# Patient Record
Sex: FEMALE | Race: BLACK OR AFRICAN AMERICAN | Employment: OTHER | URBAN - METROPOLITAN AREA
[De-identification: names, ages, dates, MRNs, and addresses within clinical notes are randomized per-mention and may not be internally consistent; named-entity substitution may affect disease eponyms.]

---

## 2017-01-23 ENCOUNTER — OFFICE VISIT (OUTPATIENT)
Dept: SURGERY | Age: 72
End: 2017-01-23

## 2017-01-23 VITALS
HEART RATE: 80 BPM | SYSTOLIC BLOOD PRESSURE: 164 MMHG | HEIGHT: 59 IN | WEIGHT: 149 LBS | DIASTOLIC BLOOD PRESSURE: 76 MMHG | BODY MASS INDEX: 30.04 KG/M2

## 2017-01-23 LAB
GLUCOSE BLD-MCNC: 229 MG/DL
HBA1C MFR BLD: 7.8 %

## 2017-01-23 PROCEDURE — 99213 OFFICE O/P EST LOW 20 MIN: CPT | Performed by: INTERNAL MEDICINE

## 2017-01-23 PROCEDURE — 82962 GLUCOSE BLOOD TEST: CPT | Performed by: INTERNAL MEDICINE

## 2017-01-23 PROCEDURE — 83036 HEMOGLOBIN GLYCOSYLATED A1C: CPT | Performed by: INTERNAL MEDICINE

## 2017-01-23 ASSESSMENT — ENCOUNTER SYMPTOMS: EYES NEGATIVE: 1

## 2017-02-06 ENCOUNTER — OFFICE VISIT (OUTPATIENT)
Dept: PHYSICAL MEDICINE AND REHAB | Age: 72
End: 2017-02-06

## 2017-02-06 VITALS
HEIGHT: 59 IN | SYSTOLIC BLOOD PRESSURE: 138 MMHG | DIASTOLIC BLOOD PRESSURE: 70 MMHG | WEIGHT: 145 LBS | BODY MASS INDEX: 29.23 KG/M2

## 2017-02-06 DIAGNOSIS — Z79.899 HIGH RISK MEDICATION USE: Chronic | ICD-10-CM

## 2017-02-06 DIAGNOSIS — G47.01 INSOMNIA SECONDARY TO CHRONIC PAIN: ICD-10-CM

## 2017-02-06 DIAGNOSIS — M54.2 NECK PAIN: ICD-10-CM

## 2017-02-06 DIAGNOSIS — Z91.199 NON-COMPLIANT PATIENT: ICD-10-CM

## 2017-02-06 DIAGNOSIS — M54.12 CERVICAL RADICULAR PAIN: ICD-10-CM

## 2017-02-06 DIAGNOSIS — G89.29 INSOMNIA SECONDARY TO CHRONIC PAIN: ICD-10-CM

## 2017-02-06 DIAGNOSIS — M54.10 DIABETIC RADICULOPATHY (HCC): Primary | ICD-10-CM

## 2017-02-06 DIAGNOSIS — F32.9 REACTIVE DEPRESSION: ICD-10-CM

## 2017-02-06 DIAGNOSIS — M47.26 OSTEOARTHRITIS OF SPINE WITH RADICULOPATHY, LUMBAR REGION: ICD-10-CM

## 2017-02-06 DIAGNOSIS — E11.49 DIABETIC RADICULOPATHY (HCC): Primary | ICD-10-CM

## 2017-02-06 LAB
AMPHETAMINE SCREEN, URINE: NORMAL
BARBITURATE SCREEN URINE: NORMAL
BENZODIAZEPINE SCREEN, URINE: NORMAL
CANNABINOID SCREEN URINE: NORMAL
COCAINE METABOLITE SCREEN URINE: NORMAL
Lab: NORMAL
OPIATE SCREEN URINE: NORMAL
PHENCYCLIDINE SCREEN URINE: NORMAL

## 2017-02-06 PROCEDURE — 99213 OFFICE O/P EST LOW 20 MIN: CPT | Performed by: PHYSICAL MEDICINE & REHABILITATION

## 2017-02-06 RX ORDER — HYDROCODONE BITARTRATE AND ACETAMINOPHEN 10; 325 MG/1; MG/1
1 TABLET ORAL EVERY 6 HOURS PRN
Qty: 60 TABLET | Refills: 0 | Status: SHIPPED | OUTPATIENT
Start: 2017-02-06 | End: 2017-06-12 | Stop reason: SDUPTHER

## 2017-02-06 RX ORDER — GABAPENTIN 600 MG/1
600 TABLET ORAL 3 TIMES DAILY
Qty: 90 TABLET | Refills: 3 | Status: SHIPPED | OUTPATIENT
Start: 2017-02-06 | End: 2017-09-22 | Stop reason: SDUPTHER

## 2017-02-06 ASSESSMENT — ENCOUNTER SYMPTOMS
SHORTNESS OF BREATH: 1
DIARRHEA: 0
BACK PAIN: 1
ABDOMINAL PAIN: 0
VOMITING: 0
EYES NEGATIVE: 1
NAUSEA: 0
CONSTIPATION: 0
BOWEL INCONTINENCE: 0

## 2017-02-14 ENCOUNTER — OFFICE VISIT (OUTPATIENT)
Dept: FAMILY MEDICINE CLINIC | Age: 72
End: 2017-02-14

## 2017-02-14 VITALS
HEIGHT: 59 IN | RESPIRATION RATE: 22 BRPM | SYSTOLIC BLOOD PRESSURE: 140 MMHG | BODY MASS INDEX: 30.04 KG/M2 | OXYGEN SATURATION: 98 % | WEIGHT: 149 LBS | DIASTOLIC BLOOD PRESSURE: 78 MMHG | TEMPERATURE: 96.5 F | HEART RATE: 93 BPM

## 2017-02-14 DIAGNOSIS — E78.49 OTHER HYPERLIPIDEMIA: Primary | ICD-10-CM

## 2017-02-14 DIAGNOSIS — F41.9 ANXIETY: ICD-10-CM

## 2017-02-14 DIAGNOSIS — I10 ESSENTIAL HYPERTENSION: ICD-10-CM

## 2017-02-14 PROCEDURE — 99214 OFFICE O/P EST MOD 30 MIN: CPT | Performed by: FAMILY MEDICINE

## 2017-02-14 RX ORDER — METOPROLOL SUCCINATE 100 MG/1
TABLET, EXTENDED RELEASE ORAL
Qty: 30 TABLET | Refills: 1 | Status: SHIPPED | OUTPATIENT
Start: 2017-02-14 | End: 2017-09-19 | Stop reason: SDUPTHER

## 2017-02-14 RX ORDER — PANTOPRAZOLE SODIUM 40 MG/1
40 TABLET, DELAYED RELEASE ORAL DAILY
Qty: 30 TABLET | Refills: 0 | Status: CANCELLED | OUTPATIENT
Start: 2017-02-14

## 2017-02-14 RX ORDER — PAROXETINE HYDROCHLORIDE 40 MG/1
TABLET, FILM COATED ORAL
Qty: 30 TABLET | Refills: 1 | Status: SHIPPED | OUTPATIENT
Start: 2017-02-14 | End: 2017-04-24 | Stop reason: SDUPTHER

## 2017-02-14 RX ORDER — ROSUVASTATIN CALCIUM 10 MG/1
10 TABLET, COATED ORAL NIGHTLY
Qty: 30 TABLET | Refills: 1 | Status: SHIPPED | OUTPATIENT
Start: 2017-02-14 | End: 2017-10-17 | Stop reason: SDUPTHER

## 2017-02-14 RX ORDER — AMLODIPINE BESYLATE 5 MG/1
TABLET ORAL
Qty: 30 TABLET | Refills: 1 | Status: SHIPPED | OUTPATIENT
Start: 2017-02-14 | End: 2017-10-17 | Stop reason: SDUPTHER

## 2017-02-14 ASSESSMENT — ENCOUNTER SYMPTOMS: SHORTNESS OF BREATH: 0

## 2017-02-23 ENCOUNTER — TELEPHONE (OUTPATIENT)
Dept: FAMILY MEDICINE CLINIC | Age: 72
End: 2017-02-23

## 2017-03-10 ENCOUNTER — OFFICE VISIT (OUTPATIENT)
Dept: FAMILY MEDICINE CLINIC | Age: 72
End: 2017-03-10

## 2017-03-10 VITALS
TEMPERATURE: 97.5 F | HEART RATE: 96 BPM | WEIGHT: 139 LBS | DIASTOLIC BLOOD PRESSURE: 80 MMHG | HEIGHT: 59 IN | RESPIRATION RATE: 16 BRPM | SYSTOLIC BLOOD PRESSURE: 126 MMHG | BODY MASS INDEX: 28.02 KG/M2

## 2017-03-10 DIAGNOSIS — R10.13 ABDOMINAL PAIN, EPIGASTRIC: ICD-10-CM

## 2017-03-10 DIAGNOSIS — R10.12 ABDOMINAL PAIN, LEFT UPPER QUADRANT: Primary | ICD-10-CM

## 2017-03-10 DIAGNOSIS — R10.12 ABDOMINAL PAIN, LEFT UPPER QUADRANT: ICD-10-CM

## 2017-03-10 LAB
BILIRUBIN, POC: ABNORMAL
BLOOD URINE, POC: ABNORMAL
CLARITY, POC: CLEAR
COLOR, POC: YELLOW
GLUCOSE URINE, POC: ABNORMAL
KETONES, POC: ABNORMAL
LEUKOCYTE EST, POC: ABNORMAL
NITRITE, POC: ABNORMAL
PH, POC: 5.5
PROTEIN, POC: ABNORMAL
SPECIFIC GRAVITY, POC: 1020
UROBILINOGEN, POC: ABNORMAL

## 2017-03-10 PROCEDURE — 81003 URINALYSIS AUTO W/O SCOPE: CPT | Performed by: FAMILY MEDICINE

## 2017-03-10 PROCEDURE — 99213 OFFICE O/P EST LOW 20 MIN: CPT | Performed by: FAMILY MEDICINE

## 2017-03-10 RX ORDER — PANTOPRAZOLE SODIUM 40 MG/1
40 TABLET, DELAYED RELEASE ORAL DAILY
Qty: 30 TABLET | Refills: 0 | Status: ON HOLD | OUTPATIENT
Start: 2017-03-10 | End: 2017-06-05

## 2017-03-10 ASSESSMENT — ENCOUNTER SYMPTOMS
ABDOMINAL PAIN: 1
BLOOD IN STOOL: 0

## 2017-03-12 LAB — URINE CULTURE, ROUTINE: NORMAL

## 2017-03-23 ENCOUNTER — ANESTHESIA EVENT (OUTPATIENT)
Dept: ENDOSCOPY | Age: 72
End: 2017-03-23

## 2017-03-23 ASSESSMENT — ENCOUNTER SYMPTOMS: SHORTNESS OF BREATH: 1

## 2017-03-24 ENCOUNTER — HOSPITAL ENCOUNTER (OUTPATIENT)
Age: 72
Setting detail: OUTPATIENT SURGERY
Discharge: HOME | End: 2017-03-24
Attending: INTERNAL MEDICINE | Admitting: INTERNAL MEDICINE

## 2017-03-24 ENCOUNTER — OFFICE VISIT (OUTPATIENT)
Dept: GASTROENTEROLOGY | Age: 72
End: 2017-03-24

## 2017-03-24 ENCOUNTER — ANESTHESIA (OUTPATIENT)
Dept: ENDOSCOPY | Age: 72
End: 2017-03-24

## 2017-03-24 VITALS
BODY MASS INDEX: 27.27 KG/M2 | WEIGHT: 135 LBS | HEART RATE: 96 BPM | SYSTOLIC BLOOD PRESSURE: 167 MMHG | DIASTOLIC BLOOD PRESSURE: 94 MMHG

## 2017-03-24 VITALS
HEART RATE: 100 BPM | WEIGHT: 135 LBS | DIASTOLIC BLOOD PRESSURE: 66 MMHG | BODY MASS INDEX: 27.21 KG/M2 | TEMPERATURE: 98.5 F | SYSTOLIC BLOOD PRESSURE: 146 MMHG | HEIGHT: 59 IN | RESPIRATION RATE: 20 BRPM | OXYGEN SATURATION: 100 %

## 2017-03-24 VITALS
SYSTOLIC BLOOD PRESSURE: 123 MMHG | OXYGEN SATURATION: 100 % | DIASTOLIC BLOOD PRESSURE: 57 MMHG | RESPIRATION RATE: 32 BRPM

## 2017-03-24 DIAGNOSIS — R10.12 LUQ ABDOMINAL PAIN: Primary | ICD-10-CM

## 2017-03-24 PROCEDURE — 7100000010 HC PHASE II RECOVERY - FIRST 15 MIN: Performed by: INTERNAL MEDICINE

## 2017-03-24 PROCEDURE — 2500000003 HC RX 250 WO HCPCS: Performed by: NURSE ANESTHETIST, CERTIFIED REGISTERED

## 2017-03-24 PROCEDURE — 88305 TISSUE EXAM BY PATHOLOGIST: CPT

## 2017-03-24 PROCEDURE — 2580000003 HC RX 258: Performed by: ANESTHESIOLOGY

## 2017-03-24 PROCEDURE — 3700000000 HC ANESTHESIA ATTENDED CARE: Performed by: INTERNAL MEDICINE

## 2017-03-24 PROCEDURE — 3700000001 HC ADD 15 MINUTES (ANESTHESIA): Performed by: INTERNAL MEDICINE

## 2017-03-24 PROCEDURE — 6360000002 HC RX W HCPCS: Performed by: NURSE ANESTHETIST, CERTIFIED REGISTERED

## 2017-03-24 PROCEDURE — 88342 IMHCHEM/IMCYTCHM 1ST ANTB: CPT

## 2017-03-24 PROCEDURE — 3609017100 HC EGD: Performed by: INTERNAL MEDICINE

## 2017-03-24 RX ORDER — SODIUM CHLORIDE 9 MG/ML
INJECTION, SOLUTION INTRAVENOUS CONTINUOUS
Status: DISCONTINUED | OUTPATIENT
Start: 2017-03-24 | End: 2017-03-24 | Stop reason: HOSPADM

## 2017-03-24 RX ORDER — ONDANSETRON 2 MG/ML
4 INJECTION INTRAMUSCULAR; INTRAVENOUS
Status: DISCONTINUED | OUTPATIENT
Start: 2017-03-24 | End: 2017-03-24 | Stop reason: HOSPADM

## 2017-03-24 RX ORDER — LABETALOL HYDROCHLORIDE 5 MG/ML
INJECTION, SOLUTION INTRAVENOUS PRN
Status: DISCONTINUED | OUTPATIENT
Start: 2017-03-24 | End: 2017-03-24 | Stop reason: SDUPTHER

## 2017-03-24 RX ORDER — PROPOFOL 10 MG/ML
INJECTION, EMULSION INTRAVENOUS PRN
Status: DISCONTINUED | OUTPATIENT
Start: 2017-03-24 | End: 2017-03-24 | Stop reason: SDUPTHER

## 2017-03-24 RX ADMIN — PROPOFOL 50 MG: 10 INJECTION, EMULSION INTRAVENOUS at 09:05

## 2017-03-24 RX ADMIN — PROPOFOL 20 MG: 10 INJECTION, EMULSION INTRAVENOUS at 09:08

## 2017-03-24 RX ADMIN — PROPOFOL 10 MG: 10 INJECTION, EMULSION INTRAVENOUS at 09:11

## 2017-03-24 RX ADMIN — PROPOFOL 10 MG: 10 INJECTION, EMULSION INTRAVENOUS at 09:06

## 2017-03-24 RX ADMIN — LABETALOL HYDROCHLORIDE 5 MG: 5 INJECTION, SOLUTION INTRAVENOUS at 08:57

## 2017-03-24 RX ADMIN — LABETALOL HYDROCHLORIDE 5 MG: 5 INJECTION, SOLUTION INTRAVENOUS at 08:55

## 2017-03-24 RX ADMIN — PROPOFOL 10 MG: 10 INJECTION, EMULSION INTRAVENOUS at 09:09

## 2017-03-24 RX ADMIN — PROPOFOL 10 MG: 10 INJECTION, EMULSION INTRAVENOUS at 09:10

## 2017-03-24 RX ADMIN — PROPOFOL 10 MG: 10 INJECTION, EMULSION INTRAVENOUS at 09:07

## 2017-03-24 RX ADMIN — SODIUM CHLORIDE: 9 INJECTION, SOLUTION INTRAVENOUS at 08:06

## 2017-03-24 ASSESSMENT — PAIN - FUNCTIONAL ASSESSMENT: PAIN_FUNCTIONAL_ASSESSMENT: 0-10

## 2017-03-24 ASSESSMENT — ENCOUNTER SYMPTOMS: SHORTNESS OF BREATH: 1

## 2017-03-24 ASSESSMENT — PAIN DESCRIPTION - DESCRIPTORS: DESCRIPTORS: BURNING;CRAMPING

## 2017-04-01 ENCOUNTER — HOSPITAL ENCOUNTER (OUTPATIENT)
Dept: CT IMAGING | Age: 72
Discharge: HOME | End: 2017-04-01
Payer: MEDICARE

## 2017-04-01 VITALS — HEART RATE: 132 BPM | RESPIRATION RATE: 20 BRPM | DIASTOLIC BLOOD PRESSURE: 89 MMHG | SYSTOLIC BLOOD PRESSURE: 136 MMHG

## 2017-04-01 DIAGNOSIS — R10.12 ABDOMINAL PAIN, LEFT UPPER QUADRANT: ICD-10-CM

## 2017-04-01 DIAGNOSIS — R10.13 ABDOMINAL PAIN, EPIGASTRIC: ICD-10-CM

## 2017-04-01 LAB
GFR AFRICAN AMERICAN: >60
GFR NON-AFRICAN AMERICAN: 54
PERFORMED ON: ABNORMAL
POC CREATININE: 1 MG/DL (ref 0.6–1.2)
POC SAMPLE TYPE: ABNORMAL

## 2017-04-01 PROCEDURE — 6360000004 HC RX CONTRAST MEDICATION: Performed by: RADIOLOGY

## 2017-04-01 PROCEDURE — 74177 CT ABD & PELVIS W/CONTRAST: CPT

## 2017-04-01 PROCEDURE — 2500000003 HC RX 250 WO HCPCS: Performed by: RADIOLOGY

## 2017-04-01 RX ADMIN — BARIUM SULFATE 450 ML: 21 SUSPENSION ORAL at 13:31

## 2017-04-01 RX ADMIN — IOPAMIDOL 100 ML: 755 INJECTION, SOLUTION INTRAVENOUS at 13:31

## 2017-04-11 ENCOUNTER — OFFICE VISIT (OUTPATIENT)
Dept: PHYSICAL MEDICINE AND REHAB | Age: 72
End: 2017-04-11

## 2017-04-11 VITALS
WEIGHT: 137.7 LBS | HEIGHT: 59 IN | BODY MASS INDEX: 27.76 KG/M2 | SYSTOLIC BLOOD PRESSURE: 136 MMHG | DIASTOLIC BLOOD PRESSURE: 82 MMHG

## 2017-04-11 DIAGNOSIS — M54.2 NECK PAIN: ICD-10-CM

## 2017-04-11 DIAGNOSIS — R10.10 PAIN OF UPPER ABDOMEN: ICD-10-CM

## 2017-04-11 DIAGNOSIS — E11.42: ICD-10-CM

## 2017-04-11 DIAGNOSIS — M54.12 CERVICAL RADICULAR PAIN: ICD-10-CM

## 2017-04-11 DIAGNOSIS — M79.10 MYALGIA: ICD-10-CM

## 2017-04-11 DIAGNOSIS — Z79.899 HIGH RISK MEDICATION USE: ICD-10-CM

## 2017-04-11 DIAGNOSIS — M54.17 LUMBOSACRAL RADICULOPATHY AT L5: Primary | Chronic | ICD-10-CM

## 2017-04-11 DIAGNOSIS — M48.062 SPINAL STENOSIS OF LUMBAR REGION WITH NEUROGENIC CLAUDICATION: Chronic | ICD-10-CM

## 2017-04-11 DIAGNOSIS — Z78.9 IMPAIRED MOBILITY AND ACTIVITIES OF DAILY LIVING: ICD-10-CM

## 2017-04-11 DIAGNOSIS — I10 ESSENTIAL HYPERTENSION: ICD-10-CM

## 2017-04-11 DIAGNOSIS — G56.03 BILATERAL CARPAL TUNNEL SYNDROME: Chronic | ICD-10-CM

## 2017-04-11 DIAGNOSIS — E78.49 OTHER HYPERLIPIDEMIA: ICD-10-CM

## 2017-04-11 DIAGNOSIS — Z74.09 IMPAIRED MOBILITY AND ACTIVITIES OF DAILY LIVING: ICD-10-CM

## 2017-04-11 DIAGNOSIS — M51.36 DDD (DEGENERATIVE DISC DISEASE), LUMBAR: ICD-10-CM

## 2017-04-11 LAB
ALBUMIN SERPL-MCNC: 4.2 G/DL (ref 3.9–4.9)
ALP BLD-CCNC: 229 U/L (ref 40–130)
ALT SERPL-CCNC: 21 U/L (ref 0–33)
ANION GAP SERPL CALCULATED.3IONS-SCNC: 19 MEQ/L (ref 7–13)
AST SERPL-CCNC: 20 U/L (ref 0–35)
BASOPHILS ABSOLUTE: 0.1 K/UL (ref 0–0.2)
BASOPHILS RELATIVE PERCENT: 1.2 %
BILIRUB SERPL-MCNC: 0.3 MG/DL (ref 0–1.2)
BUN BLDV-MCNC: 12 MG/DL (ref 8–23)
CALCIUM SERPL-MCNC: 10.5 MG/DL (ref 8.6–10.2)
CHLORIDE BLD-SCNC: 96 MEQ/L (ref 98–107)
CHOLESTEROL, TOTAL: 277 MG/DL (ref 0–199)
CO2: 24 MEQ/L (ref 22–29)
CREAT SERPL-MCNC: 0.75 MG/DL (ref 0.5–0.9)
EOSINOPHILS ABSOLUTE: 0.2 K/UL (ref 0–0.7)
EOSINOPHILS RELATIVE PERCENT: 3.2 %
GFR AFRICAN AMERICAN: >60
GFR NON-AFRICAN AMERICAN: >60
GLOBULIN: 2.7 G/DL (ref 2.3–3.5)
GLUCOSE BLD-MCNC: 452 MG/DL (ref 74–109)
HCT VFR BLD CALC: 48.8 % (ref 37–47)
HDLC SERPL-MCNC: 54 MG/DL (ref 40–59)
HEMOGLOBIN: 16.3 G/DL (ref 12–16)
LDL CHOLESTEROL CALCULATED: 188 MG/DL (ref 0–129)
LYMPHOCYTES ABSOLUTE: 1.9 K/UL (ref 1–4.8)
LYMPHOCYTES RELATIVE PERCENT: 29.4 %
MCH RBC QN AUTO: 29.8 PG (ref 27–31.3)
MCHC RBC AUTO-ENTMCNC: 33.3 % (ref 33–37)
MCV RBC AUTO: 89.5 FL (ref 82–100)
MONOCYTES ABSOLUTE: 0.4 K/UL (ref 0.2–0.8)
MONOCYTES RELATIVE PERCENT: 6.4 %
NEUTROPHILS ABSOLUTE: 3.9 K/UL (ref 1.4–6.5)
NEUTROPHILS RELATIVE PERCENT: 59.8 %
PDW BLD-RTO: 14 % (ref 11.5–14.5)
PLATELET # BLD: 330 K/UL (ref 130–400)
POTASSIUM SERPL-SCNC: 4.8 MEQ/L (ref 3.5–5.1)
RBC # BLD: 5.46 M/UL (ref 4.2–5.4)
SODIUM BLD-SCNC: 139 MEQ/L (ref 132–144)
TOTAL PROTEIN: 6.9 G/DL (ref 6.4–8.1)
TRIGL SERPL-MCNC: 175 MG/DL (ref 0–200)
WBC # BLD: 6.6 K/UL (ref 4.8–10.8)

## 2017-04-11 PROCEDURE — 99215 OFFICE O/P EST HI 40 MIN: CPT | Performed by: PHYSICAL MEDICINE & REHABILITATION

## 2017-04-11 RX ORDER — TOPIRAMATE 25 MG/1
TABLET ORAL
Qty: 60 TABLET | Refills: 3 | Status: SHIPPED | OUTPATIENT
Start: 2017-04-11 | End: 2017-09-22 | Stop reason: SDUPTHER

## 2017-04-11 RX ORDER — LIDOCAINE 50 MG/G
1 PATCH TOPICAL DAILY
Qty: 30 PATCH | Refills: 0 | Status: ON HOLD | OUTPATIENT
Start: 2017-04-11 | End: 2017-06-05

## 2017-04-11 ASSESSMENT — ENCOUNTER SYMPTOMS
WHEEZING: 1
CHEST TIGHTNESS: 1
DIARRHEA: 0
ABDOMINAL PAIN: 1
CONSTIPATION: 0
VOMITING: 1
BACK PAIN: 1
SHORTNESS OF BREATH: 1
NAUSEA: 1
ABDOMINAL DISTENTION: 1
EYES NEGATIVE: 1

## 2017-04-11 ASSESSMENT — CROHNS DISEASE ACTIVITY INDEX (CDAI): CDAI SCORE: 0

## 2017-04-12 ENCOUNTER — PROCEDURE VISIT (OUTPATIENT)
Dept: PHYSICAL MEDICINE AND REHAB | Age: 72
End: 2017-04-12

## 2017-04-12 DIAGNOSIS — M79.10 MYALGIA: Primary | ICD-10-CM

## 2017-04-12 PROCEDURE — 20553 NJX 1/MLT TRIGGER POINTS 3/>: CPT | Performed by: PHYSICAL MEDICINE & REHABILITATION

## 2017-04-17 ENCOUNTER — OFFICE VISIT (OUTPATIENT)
Dept: SURGERY | Age: 72
End: 2017-04-17

## 2017-04-17 VITALS
SYSTOLIC BLOOD PRESSURE: 136 MMHG | HEIGHT: 59 IN | DIASTOLIC BLOOD PRESSURE: 70 MMHG | WEIGHT: 137 LBS | HEART RATE: 100 BPM | BODY MASS INDEX: 27.62 KG/M2

## 2017-04-17 LAB
GLUCOSE BLD-MCNC: 494 MG/DL
HBA1C MFR BLD: 14 %

## 2017-04-17 PROCEDURE — 83036 HEMOGLOBIN GLYCOSYLATED A1C: CPT | Performed by: INTERNAL MEDICINE

## 2017-04-17 PROCEDURE — 82962 GLUCOSE BLOOD TEST: CPT | Performed by: INTERNAL MEDICINE

## 2017-04-17 PROCEDURE — 99213 OFFICE O/P EST LOW 20 MIN: CPT | Performed by: INTERNAL MEDICINE

## 2017-04-17 ASSESSMENT — ENCOUNTER SYMPTOMS: EYES NEGATIVE: 1

## 2017-04-24 RX ORDER — PAROXETINE HYDROCHLORIDE 40 MG/1
TABLET, FILM COATED ORAL
Qty: 30 TABLET | Refills: 1 | Status: SHIPPED | OUTPATIENT
Start: 2017-04-24 | End: 2017-09-19 | Stop reason: SDUPTHER

## 2017-06-05 ENCOUNTER — HOSPITAL ENCOUNTER (OUTPATIENT)
Age: 72
Setting detail: OUTPATIENT SURGERY
Discharge: HOME | End: 2017-06-05
Attending: ORTHOPAEDIC SURGERY | Admitting: ORTHOPAEDIC SURGERY
Payer: MEDICARE

## 2017-06-05 VITALS
DIASTOLIC BLOOD PRESSURE: 99 MMHG | WEIGHT: 140 LBS | OXYGEN SATURATION: 100 % | TEMPERATURE: 97.4 F | HEIGHT: 59 IN | SYSTOLIC BLOOD PRESSURE: 198 MMHG | RESPIRATION RATE: 20 BRPM | HEART RATE: 83 BPM | BODY MASS INDEX: 28.22 KG/M2

## 2017-06-05 PROCEDURE — 2500000003 HC RX 250 WO HCPCS: Performed by: ORTHOPAEDIC SURGERY

## 2017-06-05 PROCEDURE — 2580000003 HC RX 258: Performed by: ORTHOPAEDIC SURGERY

## 2017-06-05 PROCEDURE — 7100000010 HC PHASE II RECOVERY - FIRST 15 MIN: Performed by: ORTHOPAEDIC SURGERY

## 2017-06-05 PROCEDURE — 3600000003 HC SURGERY LEVEL 3 BASE: Performed by: ORTHOPAEDIC SURGERY

## 2017-06-05 PROCEDURE — 3600000013 HC SURGERY LEVEL 3 ADDTL 15MIN: Performed by: ORTHOPAEDIC SURGERY

## 2017-06-05 RX ORDER — TRAMADOL HYDROCHLORIDE 50 MG/1
50 TABLET ORAL EVERY 6 HOURS PRN
Qty: 20 TABLET | Refills: 0 | Status: SHIPPED | OUTPATIENT
Start: 2017-06-05 | End: 2017-06-15

## 2017-06-05 RX ORDER — LIDOCAINE HYDROCHLORIDE 20 MG/ML
INJECTION, SOLUTION EPIDURAL; INFILTRATION; INTRACAUDAL; PERINEURAL PRN
Status: DISCONTINUED | OUTPATIENT
Start: 2017-06-05 | End: 2017-06-05 | Stop reason: HOSPADM

## 2017-06-05 RX ORDER — MAGNESIUM HYDROXIDE 1200 MG/15ML
LIQUID ORAL CONTINUOUS PRN
Status: DISCONTINUED | OUTPATIENT
Start: 2017-06-05 | End: 2017-06-05 | Stop reason: HOSPADM

## 2017-06-05 ASSESSMENT — PAIN - FUNCTIONAL ASSESSMENT: PAIN_FUNCTIONAL_ASSESSMENT: 0-10

## 2017-06-06 ENCOUNTER — TELEPHONE (OUTPATIENT)
Dept: FAMILY MEDICINE CLINIC | Age: 72
End: 2017-06-06

## 2017-06-06 DIAGNOSIS — Z12.31 ENCOUNTER FOR SCREENING MAMMOGRAM FOR MALIGNANT NEOPLASM OF BREAST: Primary | ICD-10-CM

## 2017-06-07 DIAGNOSIS — N63.10 LUMP OF RIGHT BREAST: Primary | ICD-10-CM

## 2017-06-12 ENCOUNTER — OFFICE VISIT (OUTPATIENT)
Dept: PHYSICAL MEDICINE AND REHAB | Age: 72
End: 2017-06-12

## 2017-06-12 VITALS
HEIGHT: 59 IN | SYSTOLIC BLOOD PRESSURE: 156 MMHG | BODY MASS INDEX: 26.91 KG/M2 | WEIGHT: 133.5 LBS | DIASTOLIC BLOOD PRESSURE: 80 MMHG

## 2017-06-12 DIAGNOSIS — M54.12 CERVICAL RADICULAR PAIN: ICD-10-CM

## 2017-06-12 DIAGNOSIS — G56.03 BILATERAL CARPAL TUNNEL SYNDROME: Chronic | ICD-10-CM

## 2017-06-12 DIAGNOSIS — F32.9 REACTIVE DEPRESSION: ICD-10-CM

## 2017-06-12 DIAGNOSIS — M48.062 SPINAL STENOSIS OF LUMBAR REGION WITH NEUROGENIC CLAUDICATION: Chronic | ICD-10-CM

## 2017-06-12 DIAGNOSIS — Z79.899 HIGH RISK MEDICATION USE: ICD-10-CM

## 2017-06-12 DIAGNOSIS — M54.17 LUMBOSACRAL RADICULOPATHY AT L5: Primary | Chronic | ICD-10-CM

## 2017-06-12 DIAGNOSIS — M47.26 OSTEOARTHRITIS OF SPINE WITH RADICULOPATHY, LUMBAR REGION: ICD-10-CM

## 2017-06-12 DIAGNOSIS — Z79.899 HIGH RISK MEDICATION USE: Chronic | ICD-10-CM

## 2017-06-12 PROCEDURE — 99213 OFFICE O/P EST LOW 20 MIN: CPT | Performed by: PHYSICAL MEDICINE & REHABILITATION

## 2017-06-12 RX ORDER — HYDROCODONE BITARTRATE AND ACETAMINOPHEN 10; 325 MG/1; MG/1
1 TABLET ORAL EVERY 6 HOURS PRN
Qty: 60 TABLET | Refills: 0 | Status: SHIPPED | OUTPATIENT
Start: 2017-06-12 | End: 2017-08-14 | Stop reason: SDUPTHER

## 2017-06-12 ASSESSMENT — ENCOUNTER SYMPTOMS
SHORTNESS OF BREATH: 0
ALLERGIC/IMMUNOLOGIC NEGATIVE: 1
DIARRHEA: 0
CONSTIPATION: 1
ABDOMINAL PAIN: 1
CHEST TIGHTNESS: 0
VOMITING: 0
EYES NEGATIVE: 1
ABDOMINAL DISTENTION: 0
NAUSEA: 0
WHEEZING: 0
BACK PAIN: 1

## 2017-06-12 ASSESSMENT — CROHNS DISEASE ACTIVITY INDEX (CDAI): CDAI SCORE: 0

## 2017-06-14 ENCOUNTER — TELEPHONE (OUTPATIENT)
Dept: FAMILY MEDICINE CLINIC | Age: 72
End: 2017-06-14

## 2017-06-19 DIAGNOSIS — R10.13 ABDOMINAL PAIN, EPIGASTRIC: ICD-10-CM

## 2017-06-20 RX ORDER — PANTOPRAZOLE SODIUM 40 MG/1
TABLET, DELAYED RELEASE ORAL
Qty: 30 TABLET | Refills: 0 | Status: SHIPPED | OUTPATIENT
Start: 2017-06-20 | End: 2017-08-14 | Stop reason: SDUPTHER

## 2017-07-13 DIAGNOSIS — M54.17 LUMBOSACRAL RADICULOPATHY AT L5: Chronic | ICD-10-CM

## 2017-07-13 DIAGNOSIS — M79.10 MYALGIA: ICD-10-CM

## 2017-07-13 DIAGNOSIS — Z79.899 HIGH RISK MEDICATION USE: Chronic | ICD-10-CM

## 2017-07-13 DIAGNOSIS — R10.10 PAIN OF UPPER ABDOMEN: ICD-10-CM

## 2017-07-13 DIAGNOSIS — E11.42: ICD-10-CM

## 2017-07-13 DIAGNOSIS — M51.36 DDD (DEGENERATIVE DISC DISEASE), LUMBAR: ICD-10-CM

## 2017-07-13 DIAGNOSIS — M48.062 SPINAL STENOSIS OF LUMBAR REGION WITH NEUROGENIC CLAUDICATION: Chronic | ICD-10-CM

## 2017-07-13 DIAGNOSIS — M47.26 OSTEOARTHRITIS OF SPINE WITH RADICULOPATHY, LUMBAR REGION: ICD-10-CM

## 2017-07-13 RX ORDER — HYDROCODONE BITARTRATE AND ACETAMINOPHEN 10; 325 MG/1; MG/1
1 TABLET ORAL EVERY 6 HOURS PRN
Qty: 60 TABLET | Refills: 0 | Status: CANCELLED | OUTPATIENT
Start: 2017-07-13

## 2017-08-10 ENCOUNTER — PROCEDURE VISIT (OUTPATIENT)
Dept: PHYSICAL MEDICINE AND REHAB | Age: 72
End: 2017-08-10

## 2017-08-10 DIAGNOSIS — M54.31 SCIATICA OF RIGHT SIDE: Primary | ICD-10-CM

## 2017-08-10 PROCEDURE — 64445 NJX AA&/STRD SCIATIC NRV IMG: CPT | Performed by: PHYSICAL MEDICINE & REHABILITATION

## 2017-08-14 ENCOUNTER — OFFICE VISIT (OUTPATIENT)
Dept: PHYSICAL MEDICINE AND REHAB | Age: 72
End: 2017-08-14

## 2017-08-14 VITALS
BODY MASS INDEX: 27.01 KG/M2 | WEIGHT: 134 LBS | DIASTOLIC BLOOD PRESSURE: 86 MMHG | HEIGHT: 59 IN | SYSTOLIC BLOOD PRESSURE: 138 MMHG

## 2017-08-14 DIAGNOSIS — M54.17 LUMBOSACRAL RADICULOPATHY AT S1: ICD-10-CM

## 2017-08-14 DIAGNOSIS — M54.10 DIABETIC RADICULOPATHY (HCC): ICD-10-CM

## 2017-08-14 DIAGNOSIS — M47.26 OSTEOARTHRITIS OF SPINE WITH RADICULOPATHY, LUMBAR REGION: ICD-10-CM

## 2017-08-14 DIAGNOSIS — Z79.899 HIGH RISK MEDICATION USE: ICD-10-CM

## 2017-08-14 DIAGNOSIS — M54.17 LUMBOSACRAL RADICULOPATHY AT L5: Primary | Chronic | ICD-10-CM

## 2017-08-14 DIAGNOSIS — M48.062 SPINAL STENOSIS OF LUMBAR REGION WITH NEUROGENIC CLAUDICATION: Chronic | ICD-10-CM

## 2017-08-14 DIAGNOSIS — E11.49 DIABETIC RADICULOPATHY (HCC): ICD-10-CM

## 2017-08-14 DIAGNOSIS — Z79.899 HIGH RISK MEDICATION USE: Chronic | ICD-10-CM

## 2017-08-14 DIAGNOSIS — G56.03 BILATERAL CARPAL TUNNEL SYNDROME: Chronic | ICD-10-CM

## 2017-08-14 PROCEDURE — 99214 OFFICE O/P EST MOD 30 MIN: CPT | Performed by: PHYSICAL MEDICINE & REHABILITATION

## 2017-08-14 RX ORDER — HYDROCODONE BITARTRATE AND ACETAMINOPHEN 10; 325 MG/1; MG/1
1 TABLET ORAL EVERY 6 HOURS PRN
Qty: 60 TABLET | Refills: 0 | Status: SHIPPED | OUTPATIENT
Start: 2017-08-14 | End: 2017-10-16 | Stop reason: SDUPTHER

## 2017-08-14 ASSESSMENT — ENCOUNTER SYMPTOMS
ABDOMINAL DISTENTION: 0
DIARRHEA: 0
EYES NEGATIVE: 1
VOMITING: 0
CHEST TIGHTNESS: 0
BACK PAIN: 1
ABDOMINAL PAIN: 1
SHORTNESS OF BREATH: 0
CONSTIPATION: 0
NAUSEA: 0
WHEEZING: 0
ALLERGIC/IMMUNOLOGIC NEGATIVE: 1

## 2017-08-14 ASSESSMENT — CROHNS DISEASE ACTIVITY INDEX (CDAI): CDAI SCORE: 0

## 2017-09-19 DIAGNOSIS — R10.13 ABDOMINAL PAIN, EPIGASTRIC: ICD-10-CM

## 2017-09-20 RX ORDER — PANTOPRAZOLE SODIUM 40 MG/1
TABLET, DELAYED RELEASE ORAL
Qty: 30 TABLET | Refills: 0 | Status: SHIPPED | OUTPATIENT
Start: 2017-09-20 | End: 2017-10-16 | Stop reason: SDUPTHER

## 2017-09-20 RX ORDER — PAROXETINE HYDROCHLORIDE 40 MG/1
TABLET, FILM COATED ORAL
Qty: 30 TABLET | Refills: 1 | Status: SHIPPED | OUTPATIENT
Start: 2017-09-20 | End: 2017-10-17 | Stop reason: SDUPTHER

## 2017-09-20 RX ORDER — METOPROLOL SUCCINATE 100 MG/1
TABLET, EXTENDED RELEASE ORAL
Qty: 30 TABLET | Refills: 1 | Status: SHIPPED | OUTPATIENT
Start: 2017-09-20 | End: 2018-01-02

## 2017-09-22 DIAGNOSIS — E11.42: ICD-10-CM

## 2017-09-22 DIAGNOSIS — G89.29 INSOMNIA SECONDARY TO CHRONIC PAIN: ICD-10-CM

## 2017-09-22 DIAGNOSIS — G47.01 INSOMNIA SECONDARY TO CHRONIC PAIN: ICD-10-CM

## 2017-09-22 DIAGNOSIS — M54.12 CERVICAL RADICULAR PAIN: ICD-10-CM

## 2017-09-22 DIAGNOSIS — M54.17 LUMBOSACRAL RADICULOPATHY AT L5: Chronic | ICD-10-CM

## 2017-09-22 DIAGNOSIS — M47.26 OSTEOARTHRITIS OF SPINE WITH RADICULOPATHY, LUMBAR REGION: ICD-10-CM

## 2017-09-22 RX ORDER — TOPIRAMATE 25 MG/1
TABLET ORAL
Qty: 180 TABLET | Refills: 0 | Status: SHIPPED | OUTPATIENT
Start: 2017-09-22 | End: 2017-11-16 | Stop reason: SDUPTHER

## 2017-09-22 RX ORDER — GABAPENTIN 600 MG/1
600 TABLET ORAL 3 TIMES DAILY
Qty: 270 TABLET | Refills: 0 | Status: SHIPPED | OUTPATIENT
Start: 2017-09-22 | End: 2017-11-14 | Stop reason: SDUPTHER

## 2017-10-16 ENCOUNTER — OFFICE VISIT (OUTPATIENT)
Dept: PHYSICAL MEDICINE AND REHAB | Age: 72
End: 2017-10-16

## 2017-10-16 VITALS
BODY MASS INDEX: 27.32 KG/M2 | SYSTOLIC BLOOD PRESSURE: 146 MMHG | WEIGHT: 135.5 LBS | HEIGHT: 59 IN | DIASTOLIC BLOOD PRESSURE: 76 MMHG

## 2017-10-16 DIAGNOSIS — M48.062 SPINAL STENOSIS OF LUMBAR REGION WITH NEUROGENIC CLAUDICATION: Primary | Chronic | ICD-10-CM

## 2017-10-16 DIAGNOSIS — Z79.899 HIGH RISK MEDICATION USE: Chronic | ICD-10-CM

## 2017-10-16 DIAGNOSIS — F32.9 REACTIVE DEPRESSION: ICD-10-CM

## 2017-10-16 DIAGNOSIS — M51.36 DDD (DEGENERATIVE DISC DISEASE), LUMBAR: ICD-10-CM

## 2017-10-16 DIAGNOSIS — Z79.899 HIGH RISK MEDICATION USE: ICD-10-CM

## 2017-10-16 DIAGNOSIS — M54.17 LUMBOSACRAL RADICULOPATHY AT L5: Chronic | ICD-10-CM

## 2017-10-16 DIAGNOSIS — G89.29 INSOMNIA SECONDARY TO CHRONIC PAIN: ICD-10-CM

## 2017-10-16 DIAGNOSIS — M54.12 CERVICAL RADICULAR PAIN: ICD-10-CM

## 2017-10-16 DIAGNOSIS — M47.26 OSTEOARTHRITIS OF SPINE WITH RADICULOPATHY, LUMBAR REGION: ICD-10-CM

## 2017-10-16 DIAGNOSIS — G47.01 INSOMNIA SECONDARY TO CHRONIC PAIN: ICD-10-CM

## 2017-10-16 PROCEDURE — 99214 OFFICE O/P EST MOD 30 MIN: CPT | Performed by: PHYSICAL MEDICINE & REHABILITATION

## 2017-10-16 RX ORDER — HYDROCODONE BITARTRATE AND ACETAMINOPHEN 10; 325 MG/1; MG/1
1 TABLET ORAL EVERY 6 HOURS PRN
Qty: 60 TABLET | Refills: 0 | Status: SHIPPED | OUTPATIENT
Start: 2017-10-16 | End: 2017-12-21 | Stop reason: SDUPTHER

## 2017-10-16 ASSESSMENT — ENCOUNTER SYMPTOMS
CHEST TIGHTNESS: 0
WHEEZING: 1
ABDOMINAL DISTENTION: 0
ABDOMINAL PAIN: 1
CONSTIPATION: 0
DIARRHEA: 0
EYES NEGATIVE: 1
SHORTNESS OF BREATH: 1
BACK PAIN: 1
NAUSEA: 0
VOMITING: 0
ALLERGIC/IMMUNOLOGIC NEGATIVE: 1

## 2017-10-16 ASSESSMENT — CROHNS DISEASE ACTIVITY INDEX (CDAI): CDAI SCORE: 0

## 2017-10-16 NOTE — PROGRESS NOTES
Negative for chest pain, palpitations and leg swelling. Gastrointestinal: Positive for abdominal pain. Negative for abdominal distention, constipation, diarrhea, nausea and vomiting. Endocrine: Negative. Genitourinary: Negative. Negative for dysuria. Musculoskeletal: Positive for arthralgias, back pain and myalgias. Negative for gait problem, joint swelling and neck pain. Skin: Negative. Allergic/Immunologic: Negative. Neurological: Negative for dizziness, weakness, light-headedness, numbness and headaches. Psychiatric/Behavioral: Positive for dysphoric mood and sleep disturbance. Negative for hallucinations. The patient is nervous/anxious. Objective    Vitals:    10/16/17 1015 10/16/17 1025   BP: (!) 142/80 (!) 146/76   Weight: 135 lb 8 oz (61.5 kg)    Height: 4' 11\" (1.499 m)      Pain Score: SIX     Physical Exam   Constitutional: She is oriented to person, place, and time. Vital signs are normal. She appears well-developed and well-nourished. Non-toxic appearance. She does not have a sickly appearance. She does not appear ill. No distress. HENT:   Head: Normocephalic and atraumatic. Right Ear: Hearing normal.   Left Ear: Hearing normal.   Nose: Nose normal.   Mouth/Throat: Oropharynx is clear and moist and mucous membranes are normal. No oral lesions. Normal dentition. No oropharyngeal exudate. dysphonic   Eyes: Conjunctivae and EOM are normal. Pupils are equal, round, and reactive to light. Right eye exhibits no chemosis, no discharge and no exudate. Left eye exhibits no chemosis, no discharge and no exudate. No scleral icterus. Neck: Normal range of motion. Neck supple. No JVD present. No neck rigidity. No tracheal deviation and no edema present. No thyromegaly present. Cardiovascular: Normal rate, regular rhythm, normal heart sounds and intact distal pulses. Exam reveals no gallop, no friction rub and no decreased pulses. No murmur heard.   Pulmonary/Chest: Effort normal. No accessory muscle usage. No apnea, no tachypnea and no bradypnea. No respiratory distress. She has decreased breath sounds. She has no wheezes. She has no rales. She exhibits no tenderness. Abdominal: Soft. Bowel sounds are normal. She exhibits no distension and no mass. There is no tenderness. There is no rebound and no guarding. Area of pain and hypersensitivity   Musculoskeletal: She exhibits tenderness. She exhibits no edema. Right shoulder: Normal.        Left shoulder: Normal.        Right elbow: Normal.       Left elbow: Normal.        Right wrist: Normal.        Left wrist: Normal.        Right hip: Normal.        Left hip: Normal.        Right knee: She exhibits decreased range of motion and swelling. Tenderness found. Medial joint line tenderness noted. Left knee: She exhibits decreased range of motion. Tenderness found. Medial joint line and lateral joint line tenderness noted. Right ankle: Normal. Achilles tendon normal.        Left ankle: Normal. Achilles tendon normal.        Cervical back: She exhibits decreased range of motion, tenderness, pain and spasm. Thoracic back: She exhibits decreased range of motion, tenderness, pain and spasm. Lumbar back: She exhibits decreased range of motion, tenderness, bony tenderness and pain. She exhibits no swelling, no edema, no deformity, no laceration and normal pulse. Back:         Right upper arm: Normal.        Left upper arm: Normal.        Right forearm: Normal.        Left forearm: Normal.        Arms:       Right hand: Normal. Normal strength noted. Left hand: Decreased sensation noted. Decreased sensation is present in the medial distribution. Normal strength noted.         Right upper leg: Normal.        Left upper leg: Normal.        Right lower leg: Normal.        Left lower leg: Normal.        Legs:       Right foot: Normal.        Left foot: Normal.        Feet:    Tender areas are indicated by numbered spot    Less tender    Numbness left flank             Neurological: She is alert and oriented to person, place, and time. She displays abnormal reflex. She displays no atrophy and no tremor. A sensory deficit is present. No cranial nerve deficit. She exhibits normal muscle tone. Gait abnormal. Coordination normal. She displays no Babinski's sign on the right side. She displays no Babinski's sign on the left side. Skin: Skin is warm, dry and intact. No abrasion, no bruising, no ecchymosis, no laceration, no petechiae and no rash noted. Rash is not macular, not pustular and not urticarial. She is not diaphoretic. No cyanosis or erythema. No pallor. Nails show no clubbing. Psychiatric: She has a normal mood and affect. Her speech is normal and behavior is normal. Judgment and thought content normal. Her mood appears not anxious. Her affect is not angry, not blunt, not labile and not inappropriate. She is not agitated, not aggressive, not hyperactive, not slowed, not withdrawn, not actively hallucinating and not combative. Thought content is not paranoid and not delusional. Cognition and memory are normal. Cognition and memory are not impaired. She does not express impulsivity or inappropriate judgment. She does not exhibit a depressed mood. She expresses no homicidal and no suicidal ideation. She expresses no suicidal plans and no homicidal plans. She exhibits normal recent memory and normal remote memory. She is attentive. Vitals reviewed. Ortho Exam  Neurologic Exam     Mental Status   Oriented to person, place, and time. Speech: speech is normal     Cranial Nerves     CN III, IV, VI   Pupils are equal, round, and reactive to light.   Extraocular motions are normal.       After a thorough review and discussion of the previous medical records, patient comprehensive medical, surgical, and family and social history, Review of Systems, their OARRS, their Screener and Opioid Assessment for regarding off label use,treatment options and medication and injection risks. Current and old OARRS ([place] Automated Prescription Reporting System) records reviewed, all refills reviewed since last visit,  Behavioral agreement/MICHAELA regulations   and Toxicology screen was reviewed with patient and is up to date. There are no current red flags. They are making good progress regarding pain relief, they are performing at a functional level regarding activities of daily living and psychological functioning, they're not having any adverse effects or side effects from the current medications, and I see no findings of aberrant drug taking her addiction related behaviors. Attestation: The Prescription Monitoring Report for this patient was reviewed today. (Destiny Cancer, DO)  Documentation: Possible medication side effects, risk of tolerance and/or dependence, and alternative treatments discussed., No signs of potential drug abuse or diversion identified., Existing medication contract. (Destiny Cancer, DO)       Patient is currently taking:       I have changed Ms. Villanueva's HYDROcodone-acetaminophen. I am also having her maintain her Insulin Pen Needle, Insulin Syringe-Needle U-100, pantoprazole, albuterol, albuterol sulfate HFA, insulin 70-30, amLODIPine, rosuvastatin, metoprolol succinate, PARoxetine, topiramate, and gabapentin. I also recommend the following Medications:    Orders Placed This Encounter   Medications    HYDROcodone-acetaminophen (NORCO)  MG per tablet     Sig: Take 1 tablet by mouth every 6 hours as needed for Pain  MAX 2 PER DAY/#60 PER MONTH. DO NOT GET NARCOTIC MEDICATIONS FROM ANY OTHER PROVIDER. Elizabeth Ket Date: 10/16/17     Dispense:  60 tablet     Refill:  0        -which helps with pain and function. Otherwise, continue the current pain medications that I have prescibed. Radiologic:   Old films reviewed  ,     I discussed results with patients.    see 11/10/16 SCORE: 27-HIGH RISK     02/06/17 score: 22-high risk   04/11/17 score:32-high risk  06/12/17 score: 21-high risk  08/14/17 score: 18-moderate risk  10/16/17 score: 15-moderate risk               Stop Cigarette/Tobacco use      Weight < 140 lb (63.504 kg)           Injections or Epidurals:  Injection options were discussed. Patient gave verbal consent to ordered injections. See follow-up plans for planned injections. Supplements:  Vitamin D,   Education was given on:   Dietary and Fitness             Follow up with Primary Care Physician regarding their general medical needs. They are to follow up in 2 months to review medication, efficacy of injections, pill counts, OARRS check, SOAPPR assessment, review diagnostics, to review previous and future treatment plans and assess appropriateness for continued therapy.         New Diagnostics  Follow-up with her family doctor about abdominal pain which has subsided that she is taking too much aspirin she is take at most one a day    Cortney Savage, DO

## 2017-10-17 ENCOUNTER — OFFICE VISIT (OUTPATIENT)
Dept: FAMILY MEDICINE CLINIC | Age: 72
End: 2017-10-17

## 2017-10-17 VITALS
SYSTOLIC BLOOD PRESSURE: 168 MMHG | DIASTOLIC BLOOD PRESSURE: 86 MMHG | WEIGHT: 133.2 LBS | HEIGHT: 59 IN | RESPIRATION RATE: 14 BRPM | OXYGEN SATURATION: 99 % | TEMPERATURE: 97.5 F | HEART RATE: 73 BPM | BODY MASS INDEX: 26.85 KG/M2

## 2017-10-17 DIAGNOSIS — F32.A DEPRESSION, UNSPECIFIED DEPRESSION TYPE: ICD-10-CM

## 2017-10-17 DIAGNOSIS — F41.9 ANXIETY: ICD-10-CM

## 2017-10-17 DIAGNOSIS — E78.49 OTHER HYPERLIPIDEMIA: Primary | ICD-10-CM

## 2017-10-17 DIAGNOSIS — K21.9 GASTROESOPHAGEAL REFLUX DISEASE WITHOUT ESOPHAGITIS: ICD-10-CM

## 2017-10-17 DIAGNOSIS — I10 ESSENTIAL HYPERTENSION: ICD-10-CM

## 2017-10-17 DIAGNOSIS — Z76.0 MEDICATION REFILL: ICD-10-CM

## 2017-10-17 PROCEDURE — 99214 OFFICE O/P EST MOD 30 MIN: CPT | Performed by: FAMILY MEDICINE

## 2017-10-17 RX ORDER — ROSUVASTATIN CALCIUM 10 MG/1
10 TABLET, COATED ORAL NIGHTLY
Qty: 90 TABLET | Refills: 0 | Status: SHIPPED | OUTPATIENT
Start: 2017-10-17 | End: 2017-11-29 | Stop reason: SDUPTHER

## 2017-10-17 RX ORDER — PANTOPRAZOLE SODIUM 40 MG/1
40 TABLET, DELAYED RELEASE ORAL DAILY
Qty: 90 TABLET | Refills: 0 | Status: SHIPPED | OUTPATIENT
Start: 2017-10-17 | End: 2017-12-05 | Stop reason: SDUPTHER

## 2017-10-17 RX ORDER — METOPROLOL SUCCINATE 200 MG/1
200 TABLET, EXTENDED RELEASE ORAL DAILY
Qty: 90 TABLET | Refills: 0 | Status: SHIPPED | OUTPATIENT
Start: 2017-10-17 | End: 2017-12-05 | Stop reason: SDUPTHER

## 2017-10-17 RX ORDER — PAROXETINE HYDROCHLORIDE 40 MG/1
TABLET, FILM COATED ORAL
Qty: 90 TABLET | Refills: 0 | Status: SHIPPED | OUTPATIENT
Start: 2017-10-17 | End: 2017-12-05 | Stop reason: SDUPTHER

## 2017-10-17 RX ORDER — AMLODIPINE BESYLATE 5 MG/1
TABLET ORAL
Qty: 90 TABLET | Refills: 0 | Status: SHIPPED | OUTPATIENT
Start: 2017-10-17 | End: 2017-12-05 | Stop reason: SDUPTHER

## 2017-10-17 RX ORDER — ALBUTEROL SULFATE 90 UG/1
2 AEROSOL, METERED RESPIRATORY (INHALATION) EVERY 6 HOURS PRN
Qty: 3 INHALER | Refills: 0 | Status: SHIPPED | OUTPATIENT
Start: 2017-10-17 | End: 2018-04-27

## 2017-10-17 RX ORDER — METOPROLOL SUCCINATE 100 MG/1
TABLET, EXTENDED RELEASE ORAL
Qty: 90 TABLET | Refills: 1 | Status: CANCELLED | OUTPATIENT
Start: 2017-10-17

## 2017-10-17 ASSESSMENT — ENCOUNTER SYMPTOMS
ABDOMINAL PAIN: 0
SHORTNESS OF BREATH: 0

## 2017-10-17 NOTE — PROGRESS NOTES
 Retired-      Social History Main Topics    Smoking status: Former Smoker     Packs/day: 1.00     Years: 15.00     Types: Cigarettes     Quit date: 1/20/2014    Smokeless tobacco: Never Used    Alcohol use No    Drug use: No    Sexual activity: No     Other Topics Concern    None     Social History Narrative    None     Current Outpatient Prescriptions   Medication Sig Dispense Refill    albuterol sulfate  (90 Base) MCG/ACT inhaler Inhale 2 puffs into the lungs every 6 hours as needed for Wheezing 3 Inhaler 0    PARoxetine (PAXIL) 40 MG tablet TAKE ONE TABLET BY MOUTH ONCE DAILY IN THE MORNING 90 tablet 0    amLODIPine (NORVASC) 5 MG tablet TAKE ONE TABLET BY MOUTH ONCE DAILY 90 tablet 0    rosuvastatin (CRESTOR) 10 MG tablet Take 1 tablet by mouth nightly 90 tablet 0    pantoprazole (PROTONIX) 40 MG tablet Take 1 tablet by mouth daily 90 tablet 0    metoprolol succinate (TOPROL XL) 200 MG extended release tablet Take 1 tablet by mouth daily 90 tablet 0    HYDROcodone-acetaminophen (NORCO)  MG per tablet Take 1 tablet by mouth every 6 hours as needed for Pain  MAX 2 PER DAY/#60 PER MONTH. DO NOT GET NARCOTIC MEDICATIONS FROM ANY OTHER PROVIDER. Isaiah Learn Date: 10/16/17 60 tablet 0    topiramate (TOPAMAX) 25 MG tablet Take one or two nightly as tolerated for pain 180 tablet 0    gabapentin (NEURONTIN) 600 MG tablet Take 1 tablet by mouth 3 times daily 270 tablet 0    metoprolol succinate (TOPROL XL) 100 MG extended release tablet TAKE ONE TABLET BY MOUTH ONCE DAILY 30 tablet 1    insulin 70-30 (HUMULIN 70/30) (70-30) 100 UNIT per ML injection vial Inject 60 units bid please give 4 vials for a 30 day supply 4 vial 3    albuterol (PROVENTIL) (2.5 MG/3ML) 0.083% nebulizer solution Take 2.5 mg by nebulization every 4 hours as needed for Wheezing      Insulin Syringe-Needle U-100 ([employer] INSULIN SYRINGE) 31G X 5/16\" 0.5 ML MISC 1 each by Does not apply route daily 100 each 3    Insulin Pen Needle (NOVOFINE) 32G X 6 MM MISC Use bid 200 each 11     No current facility-administered medications for this visit. Family History   Problem Relation Age of Onset    Heart Disease Father     Arthritis Father     Arthritis Mother      Past Medical History:   Diagnosis Date    Anxiety     Asthma     Chronic back pain     Bilateral L5 S1 Radic on emg--surprisingly worse on the left than the right--pt's symptoms and her MRI show worse on the right    Depression     Fibromyalgia     Hyperlipidemia     Hypertension     Osteoarthritis     Type II diabetes mellitus, uncontrolled ([address]     Unspecified sleep apnea        Objective:   Physical Exam  Neck:no carotid bruits. No masses. No adenopathy. No thyroid asymmetry. Lungs:clear and equal breath sounds. No wheezes or rales. Heart:rate reg. No murmur. No gallops   Pulses:Radials 2+ equal               Poster tib 1+ equal  Extremities:no edema in either leg  Gen: In no acute distress  Abdomen; B.S present. Soft  Non tender. No hepatosplenomegaly. No masses   Patient with appropriate affect. Alert  Thought content appropriate  Good eye contact    Assessment:       1. Other hyperlipidemia  Lipid Panel    Comprehensive Metabolic Panel    CBC Auto Differential    Gamma Gt   2. Essential hypertension  Lipid Panel    Comprehensive Metabolic Panel    CBC Auto Differential   3. Anxiety     4. Gastroesophageal reflux disease without esophagitis     5. Depression, unspecified depression type     6.  Medication refill        Plan:     continue current dose of paxil   Orders Placed This Encounter   Medications    albuterol sulfate  (90 Base) MCG/ACT inhaler     Sig: Inhale 2 puffs into the lungs every 6 hours as needed for Wheezing     Dispense:  3 Inhaler     Refill:  0    PARoxetine (PAXIL) 40 MG tablet     Sig: TAKE ONE TABLET BY MOUTH ONCE DAILY IN THE MORNING     Dispense:  90 tablet     Refill:  0    amLODIPine

## 2017-11-14 DIAGNOSIS — M54.12 CERVICAL RADICULAR PAIN: ICD-10-CM

## 2017-11-14 DIAGNOSIS — M47.26 OSTEOARTHRITIS OF SPINE WITH RADICULOPATHY, LUMBAR REGION: ICD-10-CM

## 2017-11-14 DIAGNOSIS — G89.29 INSOMNIA SECONDARY TO CHRONIC PAIN: ICD-10-CM

## 2017-11-14 DIAGNOSIS — G47.01 INSOMNIA SECONDARY TO CHRONIC PAIN: ICD-10-CM

## 2017-11-14 RX ORDER — GABAPENTIN 600 MG/1
600 TABLET ORAL 3 TIMES DAILY
Qty: 270 TABLET | Refills: 0 | Status: SHIPPED | OUTPATIENT
Start: 2017-11-14 | End: 2018-01-02

## 2017-11-16 DIAGNOSIS — M54.17 LUMBOSACRAL RADICULOPATHY AT L5: Chronic | ICD-10-CM

## 2017-11-16 DIAGNOSIS — E11.42: ICD-10-CM

## 2017-11-16 RX ORDER — TOPIRAMATE 25 MG/1
TABLET ORAL
Qty: 180 TABLET | Refills: 0 | Status: SHIPPED | OUTPATIENT
Start: 2017-11-16 | End: 2018-01-02

## 2017-11-29 RX ORDER — ROSUVASTATIN CALCIUM 10 MG/1
10 TABLET, COATED ORAL NIGHTLY
Qty: 30 TABLET | Refills: 0 | Status: SHIPPED | OUTPATIENT
Start: 2017-11-29 | End: 2018-01-04 | Stop reason: SDUPTHER

## 2017-12-21 ENCOUNTER — APPOINTMENT (OUTPATIENT)
Dept: CT IMAGING | Age: 72
End: 2017-12-21
Payer: MEDICARE

## 2017-12-21 ENCOUNTER — OFFICE VISIT (OUTPATIENT)
Dept: PHYSICAL MEDICINE AND REHAB | Age: 72
End: 2017-12-21

## 2017-12-21 ENCOUNTER — HOSPITAL ENCOUNTER (EMERGENCY)
Age: 72
Discharge: HOME | End: 2017-12-21
Attending: EMERGENCY MEDICINE
Payer: MEDICARE

## 2017-12-21 VITALS
OXYGEN SATURATION: 100 % | HEIGHT: 59 IN | DIASTOLIC BLOOD PRESSURE: 90 MMHG | TEMPERATURE: 97.5 F | RESPIRATION RATE: 16 BRPM | WEIGHT: 134.31 LBS | SYSTOLIC BLOOD PRESSURE: 176 MMHG | HEART RATE: 69 BPM | BODY MASS INDEX: 27.08 KG/M2

## 2017-12-21 VITALS
HEIGHT: 59 IN | WEIGHT: 134 LBS | SYSTOLIC BLOOD PRESSURE: 136 MMHG | DIASTOLIC BLOOD PRESSURE: 72 MMHG | BODY MASS INDEX: 27.01 KG/M2

## 2017-12-21 DIAGNOSIS — Z79.899 HIGH RISK MEDICATION USE: Chronic | ICD-10-CM

## 2017-12-21 DIAGNOSIS — M79.7 FIBROMYALGIA: ICD-10-CM

## 2017-12-21 DIAGNOSIS — Z79.899 HIGH RISK MEDICATION USE: ICD-10-CM

## 2017-12-21 DIAGNOSIS — M54.17 LUMBOSACRAL RADICULOPATHY AT L5: Primary | Chronic | ICD-10-CM

## 2017-12-21 DIAGNOSIS — F41.9 ANXIETY: ICD-10-CM

## 2017-12-21 DIAGNOSIS — R10.30 LOWER ABDOMINAL PAIN: Primary | ICD-10-CM

## 2017-12-21 DIAGNOSIS — T14.8XXA BRUISING: ICD-10-CM

## 2017-12-21 DIAGNOSIS — M47.26 OSTEOARTHRITIS OF SPINE WITH RADICULOPATHY, LUMBAR REGION: ICD-10-CM

## 2017-12-21 DIAGNOSIS — M48.062 SPINAL STENOSIS OF LUMBAR REGION WITH NEUROGENIC CLAUDICATION: Chronic | ICD-10-CM

## 2017-12-21 DIAGNOSIS — M54.17 LUMBOSACRAL RADICULOPATHY AT S1: ICD-10-CM

## 2017-12-21 DIAGNOSIS — B37.31 YEAST VAGINITIS: ICD-10-CM

## 2017-12-21 DIAGNOSIS — M51.36 DDD (DEGENERATIVE DISC DISEASE), LUMBAR: ICD-10-CM

## 2017-12-21 DIAGNOSIS — N30.00 ACUTE CYSTITIS WITHOUT HEMATURIA: ICD-10-CM

## 2017-12-21 LAB
ALBUMIN SERPL-MCNC: 4.2 G/DL (ref 3.9–4.9)
ALP BLD-CCNC: 131 U/L (ref 40–130)
ALT SERPL-CCNC: 18 U/L (ref 0–33)
AMPHETAMINE SCREEN, URINE: NORMAL
ANION GAP SERPL CALCULATED.3IONS-SCNC: 12 MEQ/L (ref 7–13)
AST SERPL-CCNC: 22 U/L (ref 0–35)
BACTERIA: ABNORMAL /HPF
BARBITURATE SCREEN URINE: NORMAL
BASOPHILS ABSOLUTE: 0.1 K/UL (ref 0–0.2)
BASOPHILS RELATIVE PERCENT: 1.5 %
BENZODIAZEPINE SCREEN, URINE: NORMAL
BILIRUB SERPL-MCNC: 0.6 MG/DL (ref 0–1.2)
BILIRUBIN URINE: NEGATIVE
BLOOD, URINE: ABNORMAL
BUN BLDV-MCNC: 16 MG/DL (ref 8–23)
CALCIUM SERPL-MCNC: 9.8 MG/DL (ref 8.6–10.2)
CANNABINOID SCREEN URINE: NORMAL
CHLORIDE BLD-SCNC: 106 MEQ/L (ref 98–107)
CLARITY: ABNORMAL
CLUE CELLS: ABNORMAL
CO2: 24 MEQ/L (ref 22–29)
COCAINE METABOLITE SCREEN URINE: NORMAL
COLOR: YELLOW
CREAT SERPL-MCNC: 0.71 MG/DL (ref 0.5–0.9)
EOSINOPHILS ABSOLUTE: 0.3 K/UL (ref 0–0.7)
EOSINOPHILS RELATIVE PERCENT: 6 %
EPITHELIAL CELLS, UA: ABNORMAL /HPF
ETHANOL PERCENT: NORMAL G/DL
ETHANOL: <10 MG/DL (ref 0–0.08)
GFR AFRICAN AMERICAN: >60
GFR NON-AFRICAN AMERICAN: >60
GLOBULIN: 2.4 G/DL (ref 2.3–3.5)
GLUCOSE BLD-MCNC: 261 MG/DL (ref 74–109)
GLUCOSE URINE: 500 MG/DL
HCT VFR BLD CALC: 45.6 % (ref 37–47)
HEMOGLOBIN: 15 G/DL (ref 12–16)
KETONES, URINE: NEGATIVE MG/DL
LEUKOCYTE ESTERASE, URINE: ABNORMAL
LIPASE: 20 U/L (ref 13–60)
LYMPHOCYTES ABSOLUTE: 2 K/UL (ref 1–4.8)
LYMPHOCYTES RELATIVE PERCENT: 38.2 %
Lab: NORMAL
MAGNESIUM: 1.8 MG/DL (ref 1.7–2.3)
MCH RBC QN AUTO: 30.1 PG (ref 27–31.3)
MCHC RBC AUTO-ENTMCNC: 33 % (ref 33–37)
MCV RBC AUTO: 91.3 FL (ref 82–100)
MONOCYTES ABSOLUTE: 0.4 K/UL (ref 0.2–0.8)
MONOCYTES RELATIVE PERCENT: 6.9 %
NEUTROPHILS ABSOLUTE: 2.5 K/UL (ref 1.4–6.5)
NEUTROPHILS RELATIVE PERCENT: 47.4 %
NITRITE, URINE: POSITIVE
OPIATE SCREEN URINE: NORMAL
PDW BLD-RTO: 14.1 % (ref 11.5–14.5)
PH UA: 5 (ref 5–9)
PHENCYCLIDINE SCREEN URINE: NORMAL
PLATELET # BLD: 290 K/UL (ref 130–400)
POTASSIUM SERPL-SCNC: 4.2 MEQ/L (ref 3.5–5.1)
PROTEIN UA: ABNORMAL MG/DL
RBC # BLD: 4.99 M/UL (ref 4.2–5.4)
RBC UA: ABNORMAL /HPF (ref 0–2)
SODIUM BLD-SCNC: 142 MEQ/L (ref 132–144)
SPECIFIC GRAVITY UA: 1.02 (ref 1–1.03)
TOTAL PROTEIN: 6.6 G/DL (ref 6.4–8.1)
TRICHOMONAS PREP: ABNORMAL
TRICHOMONAS VAGINALIS SCREEN: NEGATIVE
TROPONIN: <0.01 NG/ML (ref 0–0.01)
UROBILINOGEN, URINE: 0.2 E.U./DL
WBC # BLD: 5.2 K/UL (ref 4.8–10.8)
WBC UA: ABNORMAL /HPF (ref 0–5)
YEAST WET PREP: ABNORMAL

## 2017-12-21 PROCEDURE — 80053 COMPREHEN METABOLIC PANEL: CPT

## 2017-12-21 PROCEDURE — 83735 ASSAY OF MAGNESIUM: CPT

## 2017-12-21 PROCEDURE — 2580000003 HC RX 258: Performed by: EMERGENCY MEDICINE

## 2017-12-21 PROCEDURE — 87591 N.GONORRHOEAE DNA AMP PROB: CPT

## 2017-12-21 PROCEDURE — 96374 THER/PROPH/DIAG INJ IV PUSH: CPT

## 2017-12-21 PROCEDURE — 99284 EMERGENCY DEPT VISIT MOD MDM: CPT

## 2017-12-21 PROCEDURE — 87210 SMEAR WET MOUNT SALINE/INK: CPT

## 2017-12-21 PROCEDURE — 84484 ASSAY OF TROPONIN QUANT: CPT

## 2017-12-21 PROCEDURE — 6360000002 HC RX W HCPCS: Performed by: EMERGENCY MEDICINE

## 2017-12-21 PROCEDURE — 83690 ASSAY OF LIPASE: CPT

## 2017-12-21 PROCEDURE — 80307 DRUG TEST PRSMV CHEM ANLYZR: CPT

## 2017-12-21 PROCEDURE — G0480 DRUG TEST DEF 1-7 CLASSES: HCPCS

## 2017-12-21 PROCEDURE — 6370000000 HC RX 637 (ALT 250 FOR IP): Performed by: EMERGENCY MEDICINE

## 2017-12-21 PROCEDURE — 6360000004 HC RX CONTRAST MEDICATION: Performed by: EMERGENCY MEDICINE

## 2017-12-21 PROCEDURE — 74177 CT ABD & PELVIS W/CONTRAST: CPT

## 2017-12-21 PROCEDURE — 87491 CHLMYD TRACH DNA AMP PROBE: CPT

## 2017-12-21 PROCEDURE — 81001 URINALYSIS AUTO W/SCOPE: CPT

## 2017-12-21 PROCEDURE — 87660 TRICHOMONAS VAGIN DIR PROBE: CPT

## 2017-12-21 PROCEDURE — 99214 OFFICE O/P EST MOD 30 MIN: CPT | Performed by: PHYSICAL MEDICINE & REHABILITATION

## 2017-12-21 PROCEDURE — 85025 COMPLETE CBC W/AUTO DIFF WBC: CPT

## 2017-12-21 PROCEDURE — 36415 COLL VENOUS BLD VENIPUNCTURE: CPT

## 2017-12-21 RX ORDER — FLUCONAZOLE 100 MG/1
200 TABLET ORAL ONCE
Status: COMPLETED | OUTPATIENT
Start: 2017-12-21 | End: 2017-12-21

## 2017-12-21 RX ORDER — SODIUM CHLORIDE 0.9 % (FLUSH) 0.9 %
10 SYRINGE (ML) INJECTION PRN
Status: DISCONTINUED | OUTPATIENT
Start: 2017-12-21 | End: 2017-12-21 | Stop reason: HOSPADM

## 2017-12-21 RX ORDER — 0.9 % SODIUM CHLORIDE 0.9 %
1000 INTRAVENOUS SOLUTION INTRAVENOUS ONCE
Status: COMPLETED | OUTPATIENT
Start: 2017-12-21 | End: 2017-12-21

## 2017-12-21 RX ORDER — SULFAMETHOXAZOLE AND TRIMETHOPRIM 800; 160 MG/1; MG/1
1 TABLET ORAL 2 TIMES DAILY
Qty: 14 TABLET | Refills: 0 | Status: SHIPPED | OUTPATIENT
Start: 2017-12-21 | End: 2017-12-28

## 2017-12-21 RX ORDER — HYDROCODONE BITARTRATE AND ACETAMINOPHEN 10; 325 MG/1; MG/1
1 TABLET ORAL EVERY 6 HOURS PRN
Qty: 60 TABLET | Refills: 0 | Status: SHIPPED | OUTPATIENT
Start: 2017-12-21 | End: 2018-01-31 | Stop reason: SDUPTHER

## 2017-12-21 RX ADMIN — SODIUM CHLORIDE 1000 ML: 9 INJECTION, SOLUTION INTRAVENOUS at 15:19

## 2017-12-21 RX ADMIN — FLUCONAZOLE 200 MG: 100 TABLET ORAL at 16:09

## 2017-12-21 RX ADMIN — CEFTRIAXONE 1 G: 1 INJECTION, POWDER, FOR SOLUTION INTRAMUSCULAR; INTRAVENOUS at 15:43

## 2017-12-21 RX ADMIN — IOPAMIDOL 100 ML: 612 INJECTION, SOLUTION INTRAVENOUS at 15:34

## 2017-12-21 ASSESSMENT — ENCOUNTER SYMPTOMS
BACK PAIN: 0
VOMITING: 0
SHORTNESS OF BREATH: 1
NAUSEA: 0
WHEEZING: 1
SORE THROAT: 0
ALLERGIC/IMMUNOLOGIC NEGATIVE: 1
CHEST TIGHTNESS: 0
BOWEL INCONTINENCE: 0
VOMITING: 0
COLOR CHANGE: 1
DIARRHEA: 1
ABDOMINAL PAIN: 1
COUGH: 0
DIARRHEA: 0
COLOR CHANGE: 1
CONSTIPATION: 1
BACK PAIN: 1
EYES NEGATIVE: 1
ABDOMINAL DISTENTION: 1
NAUSEA: 0
SHORTNESS OF BREATH: 0
ABDOMINAL PAIN: 1

## 2017-12-21 ASSESSMENT — PAIN DESCRIPTION - LOCATION: LOCATION: ABDOMEN

## 2017-12-21 ASSESSMENT — PAIN DESCRIPTION - DESCRIPTORS: DESCRIPTORS: ACHING;CRAMPING

## 2017-12-21 ASSESSMENT — PAIN SCALES - GENERAL: PAINLEVEL_OUTOF10: 10

## 2017-12-21 ASSESSMENT — PAIN DESCRIPTION - PAIN TYPE: TYPE: ACUTE PAIN

## 2017-12-21 NOTE — PROGRESS NOTES
Willa langford, surgery 04/24/15 Dr. Imani Ng) for the symptoms. The treatment provided moderate relief. Past Medical History:   Diagnosis Date    Anxiety     Asthma     Chronic back pain     Bilateral L5 S1 Radic on emg--surprisingly worse on the left than the right--pt's symptoms and her MRI show worse on the right    Depression     Fibromyalgia     Hyperlipidemia     Hypertension     Osteoarthritis     Type II diabetes mellitus, uncontrolled ([address]     Unspecified sleep apnea      Past Surgical History:   Procedure Laterality Date    BACK SURGERY      CARDIAC CATHETERIZATION  11/3/14     DR. MIRELES     COLONOSCOPY  08/29/2016    w/polypectomy     EYE SURGERY      MI ESOPHAGOGASTRODUODENOSCOPY TRANSORAL DIAGNOSTIC N/A 3/24/2017    EGD ESOPHAGOGASTRODUODENOSCOPY performed by Ronnell Courtney MD at [address] [place] [address] N/CARPAL TUNNEL SURG Left 6/5/2017    LEFT  CARPAL TUNNEL RELEASE performed by Jessica Perez MD at [place] [address] ENDOSCOPY  08/26/2016    w/bx      Social History     Social History    Marital status:      Spouse name: N/A    Number of children: N/A    Years of education: N/A     Occupational History    Retired-      Social History Main Topics    Smoking status: Light Tobacco Smoker     Years: 15.00     Types: Cigarettes     Start date: 11/30/2017    Smokeless tobacco: Never Used      Comment: smokes about 4 cigarettes a day    Alcohol use No    Drug use: No    Sexual activity: No     Other Topics Concern    None     Social History Narrative    None     Family History   Problem Relation Age of Onset    Heart Disease Father     Arthritis Father     Arthritis Mother        Allergies   Allergen Reactions    Darvon [Propoxyphene Hcl]     Ibuprofen Nausea Only    Metformin And Related      Diarrhea      Norco [Hydrocodone-Acetaminophen] Nausea Only       Review of Normal.        Arms:       Right hand: Normal. Normal strength noted. Left hand: Decreased sensation noted. Decreased sensation is present in the medial distribution. Normal strength noted. Right upper leg: Normal.        Left upper leg: Normal.        Right lower leg: Normal.        Left lower leg: Normal.        Legs:       Right foot: Normal.        Left foot: Normal.        Feet:    Tender areas are indicated by numbered spot    Less tender    Numbness left flank             Neurological: She is alert and oriented to person, place, and time. She displays abnormal reflex. She displays no atrophy and no tremor. A sensory deficit is present. No cranial nerve deficit. She exhibits normal muscle tone. Gait abnormal. Coordination normal. She displays no Babinski's sign on the right side. She displays no Babinski's sign on the left side. Skin: Skin is warm, dry and intact. No abrasion, no bruising, no ecchymosis, no laceration, no petechiae and no rash noted. Rash is not macular, not pustular and not urticarial. She is not diaphoretic. No cyanosis or erythema. No pallor. Nails show no clubbing. PVD discoloration bilateral lower extremities   Psychiatric: She has a normal mood and affect. Her behavior is normal. Judgment and thought content normal. Her mood appears not anxious. Her affect is not angry, not blunt, not labile and not inappropriate. Her speech is tangential. She is not agitated, not aggressive, not hyperactive, not slowed, not withdrawn, not actively hallucinating and not combative. Thought content is not paranoid and not delusional. Cognition and memory are normal. Cognition and memory are not impaired. She does not express impulsivity or inappropriate judgment. She does not exhibit a depressed mood. She expresses no homicidal and no suicidal ideation. She expresses no suicidal plans and no homicidal plans. She exhibits normal recent memory and normal remote memory. She is attentive. Vitals reviewed. Ortho Exam  Neurologic Exam     Mental Status   Oriented to person, place, and time. Cranial Nerves     CN III, IV, VI   Pupils are equal, round, and reactive to light. Extraocular motions are normal.         After a thorough review and discussion of the previous medical records, patient comprehensive medical, surgical, and family and social history, Review of Systems, their OARRS, their Screener and Opioid Assessment for Patients with Pain (SOAPP®-R), recent diagnostics, and symptomatic results to previous treatment, it is my impression that the patients is suffering with progressive and severe:    1. Lumbosacral radiculopathy at L5  HYDROcodone-acetaminophen (NORCO)  MG per tablet   2. Spinal stenosis of lumbar region with neurogenic claudication  HYDROcodone-acetaminophen (NORCO)  MG per tablet   3. High risk medication use-Norco - 08/11/17 OARRS PM&R, 10/13/17 OARRS PM&R, Urine Drug screen negative 02/06/17 PM&R--MED CONTRACT 2/6/17     4. Lumbosacral radiculopathy at S1     5. DDD (degenerative disc disease), lumbar     6. Anxiety     7. Fibromyalgia     8. Osteoarthritis of spine with radiculopathy, lumbar region  HYDROcodone-acetaminophen (NORCO)  MG per tablet   9. High risk medication use-Norco  HYDROcodone-acetaminophen (NORCO)  MG per tablet    OARRS and last refill reviewed. 2016 narcotic contract signed.  09/11/15 tox screening       I am also concerned by lifestyle and mood issues including:    Past Medical History:   Diagnosis Date    Anxiety     Asthma     Chronic back pain     Bilateral L5 S1 Radic on emg--surprisingly worse on the left than the right--pt's symptoms and her MRI show worse on the right    Depression     Fibromyalgia     Hyperlipidemia     Hypertension     Osteoarthritis     Type II diabetes mellitus, uncontrolled ([address]     Unspecified sleep apnea            Given their medication, chronic pain and lifestyle and medications every 6 hours as needed for Pain  MAX 2 PER DAY/#60 PER MONTH. DO NOT GET NARCOTIC MEDICATIONS FROM ANY OTHER PROVIDER. Armando Obey Date: 12/21/17     Dispense:  60 tablet     Refill:  0        -which helps with pain and function. Otherwise, continue the current pain medications that I have prescibed. Radiologic:   Old films reviewed  ,    FINAL SURGICAL PATHOLOGY REPORT  Patient     Ernesto Chow                  Accession   [id number]  Name:                                        No:          FINAL DIAGNOSIS:  STOMACH, GASTRIC BIOPSIES-  MILD CHRONIC GASTRITIS WITH MINIMAL ACTIVITY. NEGATIVE FOR HELICOBACTER PYLORI ORGANISMS ON IMMUNOHISTOCHEMISTRY STAINS  WITH SATISFACTORY CONTROLS. NEGATIVE FOR INTESTINAL METAPLASIA.   ALIFA/ALIFA      CLINICAL INFORMATION:  Left-sided abdominal pain R10.30. I discussed results with patients. see Follow up plans below  For any new studies. Care Everywhere Updates:  requested and reviewed. No new issues noted.              Labs:  Previous labs reviewed     Lab Results   Component Value Date     04/11/2017    K 4.8 04/11/2017    CL 96 04/11/2017    CO2 24 04/11/2017    BUN 12 04/11/2017    CREATININE 0.75 04/11/2017    CALCIUM 10.5 04/11/2017    LABALBU 4.2 04/11/2017    BILITOT 0.3 04/11/2017    ALKPHOS 229 04/11/2017    AST 20 04/11/2017    ALT 21 04/11/2017     Lab Results   Component Value Date    WBC 6.6 04/11/2017    RBC 5.46 04/11/2017    HGB 16.3 04/11/2017    HCT 48.8 04/11/2017    MCV 89.5 04/11/2017    MCH 29.8 04/11/2017    MCHC 33.3 04/11/2017    RDW 14.0 04/11/2017     04/11/2017    MPV 8.8 08/01/2015       Lab Results   Component Value Date    LABAMPH Neg 08/14/2017    BARBSCNU Neg 08/14/2017    LABBENZ Neg 08/14/2017    CANSU Neg 08/14/2017    COCAIMETSCRU Neg 08/14/2017    PHENCYCLIDINESCREENURINE Neg 17/21/5722    TRICYCLIC not available 56/33/5782    DSCOMMENT see below 08/14/2017       No results found for: CODEINE,

## 2017-12-21 NOTE — ED NOTES
Pt resting on cart complaining of diffuse abdominal pain. Pt aware of plan of care. Resps even and non labored. Skin warm and dry.  DONALDSON x [phone], [address] [place] [address]  12/21/17 0721

## 2017-12-29 LAB
C TRACH DNA GENITAL QL NAA+PROBE: NEGATIVE
N. GONORRHOEAE DNA: NEGATIVE

## 2018-01-02 ENCOUNTER — OFFICE VISIT (OUTPATIENT)
Dept: FAMILY MEDICINE CLINIC | Age: 73
End: 2018-01-02

## 2018-01-02 VITALS
WEIGHT: 140 LBS | OXYGEN SATURATION: 99 % | RESPIRATION RATE: 14 BRPM | HEART RATE: 68 BPM | DIASTOLIC BLOOD PRESSURE: 80 MMHG | HEIGHT: 59 IN | SYSTOLIC BLOOD PRESSURE: 130 MMHG | BODY MASS INDEX: 28.22 KG/M2

## 2018-01-02 DIAGNOSIS — I10 ESSENTIAL HYPERTENSION: ICD-10-CM

## 2018-01-02 DIAGNOSIS — Z91.89 GYN EXAM FOR HIGH-RISK MEDICARE PATIENT: ICD-10-CM

## 2018-01-02 DIAGNOSIS — B96.89 BV (BACTERIAL VAGINOSIS): ICD-10-CM

## 2018-01-02 DIAGNOSIS — E78.49 OTHER HYPERLIPIDEMIA: ICD-10-CM

## 2018-01-02 DIAGNOSIS — N30.00 ACUTE CYSTITIS WITHOUT HEMATURIA: ICD-10-CM

## 2018-01-02 DIAGNOSIS — R10.9 ABDOMINAL PAIN, UNSPECIFIED ABDOMINAL LOCATION: ICD-10-CM

## 2018-01-02 DIAGNOSIS — E11.49 TYPE II DIABETES MELLITUS WITH NEUROLOGICAL MANIFESTATIONS (HCC): Primary | ICD-10-CM

## 2018-01-02 DIAGNOSIS — F33.42 RECURRENT MAJOR DEPRESSIVE EPISODES, IN FULL REMISSION (HCC): ICD-10-CM

## 2018-01-02 DIAGNOSIS — N89.8 VAGINAL DISCHARGE: ICD-10-CM

## 2018-01-02 DIAGNOSIS — Z79.4 TYPE 2 DIABETES MELLITUS WITH COMPLICATION, WITH LONG-TERM CURRENT USE OF INSULIN (HCC): ICD-10-CM

## 2018-01-02 DIAGNOSIS — E11.8 TYPE 2 DIABETES MELLITUS WITH COMPLICATION, WITH LONG-TERM CURRENT USE OF INSULIN (HCC): ICD-10-CM

## 2018-01-02 DIAGNOSIS — N76.0 BV (BACTERIAL VAGINOSIS): ICD-10-CM

## 2018-01-02 LAB
ALBUMIN SERPL-MCNC: 4.3 G/DL (ref 3.9–4.9)
ALP BLD-CCNC: 123 U/L (ref 40–130)
ALT SERPL-CCNC: 23 U/L (ref 0–33)
ANION GAP SERPL CALCULATED.3IONS-SCNC: 15 MEQ/L (ref 7–13)
AST SERPL-CCNC: 19 U/L (ref 0–35)
BILIRUB SERPL-MCNC: 0.3 MG/DL (ref 0–1.2)
BILIRUBIN, POC: ABNORMAL
BLOOD URINE, POC: ABNORMAL
BUN BLDV-MCNC: 17 MG/DL (ref 8–23)
CALCIUM SERPL-MCNC: 10.1 MG/DL (ref 8.6–10.2)
CHLORIDE BLD-SCNC: 103 MEQ/L (ref 98–107)
CHOLESTEROL, TOTAL: 158 MG/DL (ref 0–199)
CLARITY, POC: ABNORMAL
CO2: 22 MEQ/L (ref 22–29)
COLOR, POC: ABNORMAL
CREAT SERPL-MCNC: 0.82 MG/DL (ref 0.5–0.9)
CREATININE URINE: 120.7 MG/DL
GFR AFRICAN AMERICAN: >60
GFR NON-AFRICAN AMERICAN: >60
GLOBULIN: 2.7 G/DL (ref 2.3–3.5)
GLUCOSE BLD-MCNC: 279 MG/DL (ref 74–109)
GLUCOSE URINE, POC: 30
HBA1C MFR BLD: 7.7 %
HDLC SERPL-MCNC: 46 MG/DL (ref 40–59)
KETONES, POC: ABNORMAL
LDL CHOLESTEROL CALCULATED: 83 MG/DL (ref 0–129)
LEUKOCYTE EST, POC: ABNORMAL
MICROALBUMIN UR-MCNC: 2.7 MG/DL
MICROALBUMIN/CREAT UR-RTO: 22.4 MG/G (ref 0–30)
NITRITE, POC: ABNORMAL
PH, POC: 6
POTASSIUM SERPL-SCNC: 4.5 MEQ/L (ref 3.5–5.1)
PROTEIN, POC: ABNORMAL
SODIUM BLD-SCNC: 140 MEQ/L (ref 132–144)
SPECIFIC GRAVITY, POC: 1.02
TOTAL PROTEIN: 7 G/DL (ref 6.4–8.1)
TRIGL SERPL-MCNC: 143 MG/DL (ref 0–200)
UROBILINOGEN, POC: 3.5

## 2018-01-02 PROCEDURE — 3045F PR MOST RECENT HEMOGLOBIN A1C LEVEL 7.0-9.0%: CPT | Performed by: NURSE PRACTITIONER

## 2018-01-02 PROCEDURE — 3014F SCREEN MAMMO DOC REV: CPT | Performed by: NURSE PRACTITIONER

## 2018-01-02 PROCEDURE — 3017F COLORECTAL CA SCREEN DOC REV: CPT | Performed by: NURSE PRACTITIONER

## 2018-01-02 PROCEDURE — G8419 CALC BMI OUT NRM PARAM NOF/U: HCPCS | Performed by: NURSE PRACTITIONER

## 2018-01-02 PROCEDURE — G8484 FLU IMMUNIZE NO ADMIN: HCPCS | Performed by: NURSE PRACTITIONER

## 2018-01-02 PROCEDURE — G8400 PT W/DXA NO RESULTS DOC: HCPCS | Performed by: NURSE PRACTITIONER

## 2018-01-02 PROCEDURE — 99215 OFFICE O/P EST HI 40 MIN: CPT | Performed by: NURSE PRACTITIONER

## 2018-01-02 PROCEDURE — 1123F ACP DISCUSS/DSCN MKR DOCD: CPT | Performed by: NURSE PRACTITIONER

## 2018-01-02 PROCEDURE — G8427 DOCREV CUR MEDS BY ELIG CLIN: HCPCS | Performed by: NURSE PRACTITIONER

## 2018-01-02 PROCEDURE — 4040F PNEUMOC VAC/ADMIN/RCVD: CPT | Performed by: NURSE PRACTITIONER

## 2018-01-02 PROCEDURE — 81003 URINALYSIS AUTO W/O SCOPE: CPT | Performed by: NURSE PRACTITIONER

## 2018-01-02 PROCEDURE — 83036 HEMOGLOBIN GLYCOSYLATED A1C: CPT | Performed by: NURSE PRACTITIONER

## 2018-01-02 PROCEDURE — 1090F PRES/ABSN URINE INCON ASSESS: CPT | Performed by: NURSE PRACTITIONER

## 2018-01-02 PROCEDURE — 4004F PT TOBACCO SCREEN RCVD TLK: CPT | Performed by: NURSE PRACTITIONER

## 2018-01-02 RX ORDER — DOXYCYCLINE HYCLATE 100 MG
100 TABLET ORAL 2 TIMES DAILY
Qty: 20 TABLET | Refills: 0 | Status: SHIPPED | OUTPATIENT
Start: 2018-01-02 | End: 2018-01-12

## 2018-01-02 ASSESSMENT — PATIENT HEALTH QUESTIONNAIRE - PHQ9
SUM OF ALL RESPONSES TO PHQ QUESTIONS 1-9: 2
SUM OF ALL RESPONSES TO PHQ9 QUESTIONS 1 & 2: 2
1. LITTLE INTEREST OR PLEASURE IN DOING THINGS: 1
2. FEELING DOWN, DEPRESSED OR HOPELESS: 1

## 2018-01-02 NOTE — PATIENT INSTRUCTIONS
Thank you for enrolling in [address] Please follow the instructions below to securely access your online medical record. [employer] allows you to send messages to your doctor, view your test results, renew your prescriptions, schedule appointments, and more. How Do I Sign Up? 1. In your Internet browser, go to [email or web address]  2. Click on the Sign Up Now link in the Sign In box. You will see the New Member Sign Up page. 3. Enter your [employer] Access Code exactly as it appears below. You will not need to use this code after youve completed the sign-up process. If you do not sign up before the expiration date, you must request a new code. [employer] Access Code: Activation code not generated  Current [employer] Status: Patient Declined    4. Enter your Social Security Number ([id number]) and Date of Birth (mm/dd/yyyy) as indicated and click Submit. You will be taken to the next sign-up page. 5. Create a [employer] ID. This will be your [employer] login ID and cannot be changed, so think of one that is secure and easy to remember. 6. Create a [employer] password. You can change your password at any time. 7. Enter your Password Reset Question and Answer. This can be used at a later time if you forget your password. 8. Enter your e-mail address. You will receive e-mail notification when new information is available in [address] 9. Click Sign Up. You can now view your medical record. Additional Information  If you have questions, please contact your physician practice where you receive care. Remember, [employer] is NOT to be used for urgent needs. For medical emergencies, dial 911.

## 2018-01-03 LAB
CLUE CELLS: NORMAL
TRICHOMONAS PREP: NORMAL
TRICHOMONAS VAGINALIS SCREEN: NEGATIVE
YEAST WET PREP: NORMAL

## 2018-01-03 ASSESSMENT — ENCOUNTER SYMPTOMS
ABDOMINAL PAIN: 1
DIARRHEA: 0
BLURRED VISION: 0
BACK PAIN: 0
VISUAL CHANGE: 0
SHORTNESS OF BREATH: 0
CONSTIPATION: 0
ORTHOPNEA: 0
SORE THROAT: 0

## 2018-01-03 NOTE — PROGRESS NOTES
Subjective  Simone Poplar, 67 y.o. female presents today with:  Chief Complaint   Patient presents with    Vaginal Discharge     x 2 months  there is also an odor green discharge    Other     Diabetic asymmetric polyneuropathy ([address] - E11.42 ,Diabetic radiculopathy ([address] - E11.49    Abdominal Pain     pt feels her navel has detatached ? Diabetes   She has type 2 diabetes mellitus. Her disease course has been stable. There are no hypoglycemic associated symptoms. Pertinent negatives for hypoglycemia include no headaches, seizures or sweats. Associated symptoms include foot paresthesias. Pertinent negatives for diabetes include no blurred vision, no chest pain, no fatigue, no foot ulcerations, no polydipsia, no polyphagia, no polyuria, no visual change, no weakness and no weight loss. Symptoms are stable. Risk factors for coronary artery disease include dyslipidemia, family history, diabetes mellitus, hypertension and obesity. Current diabetic treatment includes oral agent (monotherapy) and insulin injections. She is compliant with treatment some of the time. She rarely participates in exercise. An ACE inhibitor/angiotensin II receptor blocker is being taken. She does not see a podiatrist.Eye exam is current. Vaginal Discharge   The patient's primary symptoms include a genital odor, pelvic pain and vaginal discharge. The patient's pertinent negatives include no genital itching, genital lesions, genital rash, missed menses or vaginal bleeding. This is a new problem. The current episode started 1 to 4 weeks ago. The problem occurs constantly. The problem has been unchanged. The pain is mild. The problem affects both sides. She is not pregnant. Associated symptoms include abdominal pain. Pertinent negatives include no anorexia, back pain, constipation, diarrhea, discolored urine, dysuria, fever, headaches, joint pain, joint swelling, painful intercourse, rash or sore throat.  The vaginal discharge was yellow SURG Left 6/5/2017    LEFT  CARPAL TUNNEL RELEASE performed by Nita Schmitz MD at [place] [address] ENDOSCOPY  08/26/2016    w/bx      Social History     Social History    Marital status:      Spouse name: N/A    Number of children: N/A    Years of education: N/A     Occupational History    Retired-      Social History Main Topics    Smoking status: Light Tobacco Smoker     Years: 15.00     Types: Cigarettes     Start date: 11/30/2017    Smokeless tobacco: Never Used      Comment: smokes about 4 cigarettes a day    Alcohol use No    Drug use: No    Sexual activity: No     Other Topics Concern    Not on file     Social History Narrative    No narrative on file     Family History   Problem Relation Age of Onset    Heart Disease Father     Arthritis Father     Arthritis Mother      Allergies   Allergen Reactions    Darvon [Propoxyphene Hcl]     Ibuprofen Nausea Only    Metformin And Related      Diarrhea      Norco [Hydrocodone-Acetaminophen] Nausea Only     Current Outpatient Prescriptions   Medication Sig Dispense Refill    doxycycline hyclate (VIBRA-TABS) 100 MG tablet Take 1 tablet by mouth 2 times daily for 10 days 20 tablet 0    HYDROcodone-acetaminophen (NORCO)  MG per tablet Take 1 tablet by mouth every 6 hours as needed for Pain  MAX 2 PER DAY/#60 PER MONTH. DO NOT GET NARCOTIC MEDICATIONS FROM ANY OTHER PROVIDER. Manny Dumont Date: 12/21/17 60 tablet 0    amLODIPine (NORVASC) 5 MG tablet TAKE 1 TABLET BY MOUTH ONCE DAILY 90 tablet 0    PARoxetine (PAXIL) 40 MG tablet TAKE 1 TABLET BY MOUTH ONCE DAILY IN THE MORNING 90 tablet 0    metoprolol succinate (TOPROL XL) 200 MG extended release tablet TAKE 1 TABLET BY MOUTH  DAILY 90 tablet 0    rosuvastatin (CRESTOR) 10 MG tablet TAKE 1 TABLET BY MOUTH  NIGHTLY 30 tablet 0    albuterol sulfate  (90 Base) MCG/ACT inhaler Inhale 2 puffs into the lungs every 1/2/2019    PAP SMEAR     Standing Status:   Future     Number of Occurrences:   1     Standing Expiration Date:   1/9/2018     Order Specific Question:   Collection Type     Answer:   SurePath     Order Specific Question:   Prior Abnormal Pap Test     Answer:   No     Order Specific Question:   Screening or Diagnostic     Answer:   Screening     Order Specific Question:   HPV Requested? Answer:   Yes - If Abnormal Reflex HPV     Order Specific Question:   High Risk Patient     Answer:   N/A    Lipid Panel     Standing Status:   Future     Number of Occurrences:   1     Standing Expiration Date:   1/2/2019     Order Specific Question:   Is Patient Fasting?/# of Hours     Answer:   y    Comprehensive Metabolic Panel     Standing Status:   Future     Number of Occurrences:   1     Standing Expiration Date:   1/2/2019    POCT Urinalysis No Micro (Auto)    POCT glycosylated hemoglobin (Hb A1C)     Orders Placed This Encounter   Medications    doxycycline hyclate (VIBRA-TABS) 100 MG tablet     Sig: Take 1 tablet by mouth 2 times daily for 10 days     Dispense:  20 tablet     Refill:  0     Medications Discontinued During This Encounter   Medication Reason    Insulin Pen Needle (NOVOFINE) 32G X 6 MM MISC     Insulin Syringe-Needle U-100 ([employer] INSULIN SYRINGE) 31G X 5/16\" 0.5 ML MISC     albuterol (PROVENTIL) (2.5 MG/3ML) 0.083% nebulizer solution     pantoprazole (PROTONIX) 40 MG tablet     metoprolol succinate (TOPROL XL) 100 MG extended release tablet     topiramate (TOPAMAX) 25 MG tablet Patient Choice    gabapentin (NEURONTIN) 600 MG tablet Patient Choice     Return in about 4 weeks (around 1/30/2018). Reviewed with the patient: current clinical status, medications, activities and diet.      Side effects, adverse effects of the medication prescribed today, as well as treatment plan/ rationale and result expectations have been discussed with the patient who expresses understanding and desires to

## 2018-01-04 RX ORDER — ROSUVASTATIN CALCIUM 10 MG/1
10 TABLET, COATED ORAL NIGHTLY
Qty: 90 TABLET | Refills: 0 | Status: SHIPPED | OUTPATIENT
Start: 2018-01-04 | End: 2018-01-05 | Stop reason: DRUGHIGH

## 2018-01-05 DIAGNOSIS — E78.49 OTHER HYPERLIPIDEMIA: Primary | ICD-10-CM

## 2018-01-05 RX ORDER — ROSUVASTATIN CALCIUM 20 MG/1
20 TABLET, COATED ORAL NIGHTLY
Qty: 90 TABLET | Refills: 0 | Status: SHIPPED | OUTPATIENT
Start: 2018-01-05 | End: 2018-02-23 | Stop reason: SDUPTHER

## 2018-01-11 ENCOUNTER — HOSPITAL ENCOUNTER (OUTPATIENT)
Dept: ULTRASOUND IMAGING | Age: 73
Discharge: HOME | End: 2018-01-11
Payer: MEDICARE

## 2018-01-11 DIAGNOSIS — Z91.89 GYN EXAM FOR HIGH-RISK MEDICARE PATIENT: ICD-10-CM

## 2018-01-11 DIAGNOSIS — N89.8 VAGINAL DISCHARGE: ICD-10-CM

## 2018-01-11 PROCEDURE — 76830 TRANSVAGINAL US NON-OB: CPT

## 2018-01-11 PROCEDURE — 76856 US EXAM PELVIC COMPLETE: CPT

## 2018-01-15 DIAGNOSIS — R93.89 ENDOMETRIAL THICKENING ON ULTRA SOUND: Primary | ICD-10-CM

## 2018-01-19 ENCOUNTER — OFFICE VISIT (OUTPATIENT)
Dept: FAMILY MEDICINE CLINIC | Age: 73
End: 2018-01-19

## 2018-01-19 VITALS
BODY MASS INDEX: 26.61 KG/M2 | DIASTOLIC BLOOD PRESSURE: 72 MMHG | RESPIRATION RATE: 15 BRPM | SYSTOLIC BLOOD PRESSURE: 130 MMHG | TEMPERATURE: 97.6 F | HEIGHT: 59 IN | HEART RATE: 96 BPM | WEIGHT: 132 LBS

## 2018-01-19 DIAGNOSIS — K42.0 UMBILICAL HERNIA WITH OBSTRUCTION, WITHOUT GANGRENE: ICD-10-CM

## 2018-01-19 DIAGNOSIS — R10.13 ABDOMINAL PAIN, EPIGASTRIC: Primary | ICD-10-CM

## 2018-01-19 PROCEDURE — G8484 FLU IMMUNIZE NO ADMIN: HCPCS | Performed by: FAMILY MEDICINE

## 2018-01-19 PROCEDURE — 3017F COLORECTAL CA SCREEN DOC REV: CPT | Performed by: FAMILY MEDICINE

## 2018-01-19 PROCEDURE — 4004F PT TOBACCO SCREEN RCVD TLK: CPT | Performed by: FAMILY MEDICINE

## 2018-01-19 PROCEDURE — 1090F PRES/ABSN URINE INCON ASSESS: CPT | Performed by: FAMILY MEDICINE

## 2018-01-19 PROCEDURE — G8400 PT W/DXA NO RESULTS DOC: HCPCS | Performed by: FAMILY MEDICINE

## 2018-01-19 PROCEDURE — G8427 DOCREV CUR MEDS BY ELIG CLIN: HCPCS | Performed by: FAMILY MEDICINE

## 2018-01-19 PROCEDURE — 99213 OFFICE O/P EST LOW 20 MIN: CPT | Performed by: FAMILY MEDICINE

## 2018-01-19 PROCEDURE — 3014F SCREEN MAMMO DOC REV: CPT | Performed by: FAMILY MEDICINE

## 2018-01-19 PROCEDURE — 1123F ACP DISCUSS/DSCN MKR DOCD: CPT | Performed by: FAMILY MEDICINE

## 2018-01-19 PROCEDURE — 4040F PNEUMOC VAC/ADMIN/RCVD: CPT | Performed by: FAMILY MEDICINE

## 2018-01-19 PROCEDURE — G8419 CALC BMI OUT NRM PARAM NOF/U: HCPCS | Performed by: FAMILY MEDICINE

## 2018-01-19 RX ORDER — OMEPRAZOLE 20 MG/1
20 CAPSULE, DELAYED RELEASE ORAL 2 TIMES DAILY
Qty: 30 CAPSULE | Refills: 0 | Status: SHIPPED | OUTPATIENT
Start: 2018-01-19 | End: 2018-05-29 | Stop reason: SDUPTHER

## 2018-01-19 ASSESSMENT — ENCOUNTER SYMPTOMS
ABDOMINAL PAIN: 1
SHORTNESS OF BREATH: 0

## 2018-01-19 NOTE — PROGRESS NOTES
Subjective:      Patient ID: Chelsey Bettencourt is a 67 y.o. female    HPI  Here with family. Has had intermittent abdominal pain over the last year or so. No vomiting. Seen in er recently for same. Review of Systems   Constitutional: Positive for appetite change. Negative for chills and fever. Respiratory: Negative for shortness of breath. Gastrointestinal: Positive for abdominal pain. Genitourinary: Negative for flank pain. Skin: Negative for rash. Reviewed allergy, medical, social, surgical, family and med list changes and updated   Files  Social History     Social History    Marital status:      Spouse name: N/A    Number of children: N/A    Years of education: N/A     Occupational History    Retired-      Social History Main Topics    Smoking status: Light Tobacco Smoker     Years: 15.00     Types: Cigarettes     Start date: 11/30/2017    Smokeless tobacco: Never Used      Comment: smokes about 4 cigarettes a day    Alcohol use No    Drug use: No    Sexual activity: No     Other Topics Concern    None     Social History Narrative    None     Current Outpatient Prescriptions   Medication Sig Dispense Refill    rosuvastatin (CRESTOR) 20 MG tablet Take 1 tablet by mouth nightly 90 tablet 0    HYDROcodone-acetaminophen (NORCO)  MG per tablet Take 1 tablet by mouth every 6 hours as needed for Pain  MAX 2 PER DAY/#60 PER MONTH. DO NOT GET NARCOTIC MEDICATIONS FROM ANY OTHER PROVIDER. Makayla Nathaly Date: 12/21/17 60 tablet 0    amLODIPine (NORVASC) 5 MG tablet TAKE 1 TABLET BY MOUTH ONCE DAILY 90 tablet 0    PARoxetine (PAXIL) 40 MG tablet TAKE 1 TABLET BY MOUTH ONCE DAILY IN THE MORNING 90 tablet 0    metoprolol succinate (TOPROL XL) 200 MG extended release tablet TAKE 1 TABLET BY MOUTH  DAILY 90 tablet 0    albuterol sulfate  (90 Base) MCG/ACT inhaler Inhale 2 puffs into the lungs every 6 hours as needed for Wheezing 3 Inhaler 0    insulin 70-30 Referral Reason:   Specialty Services Required     Referred to Provider:   Agnes Collazo MD     Requested Specialty:   Gastroenterology     Number of Visits Requested:   1     Orders Placed This Encounter   Medications    omeprazole (PRILOSEC) 20 MG delayed release capsule     Sig: Take 1 capsule by mouth 2 times daily     Dispense:  30 capsule     Refill:  0   f/u per maintenance --to er for any worsening

## 2018-01-22 ENCOUNTER — HOSPITAL ENCOUNTER (EMERGENCY)
Age: 73
Discharge: HOME | End: 2018-01-22
Attending: EMERGENCY MEDICINE
Payer: MEDICARE

## 2018-01-22 ENCOUNTER — APPOINTMENT (OUTPATIENT)
Dept: GENERAL RADIOLOGY | Age: 73
End: 2018-01-22
Payer: MEDICARE

## 2018-01-22 VITALS
DIASTOLIC BLOOD PRESSURE: 68 MMHG | SYSTOLIC BLOOD PRESSURE: 157 MMHG | WEIGHT: 132 LBS | HEIGHT: 59 IN | BODY MASS INDEX: 26.61 KG/M2 | TEMPERATURE: 98.3 F | RESPIRATION RATE: 18 BRPM | HEART RATE: 87 BPM | OXYGEN SATURATION: 98 %

## 2018-01-22 DIAGNOSIS — R10.33 PERIUMBILICAL ABDOMINAL PAIN: Primary | ICD-10-CM

## 2018-01-22 LAB
ALBUMIN SERPL-MCNC: 4.2 G/DL (ref 3.9–4.9)
ALP BLD-CCNC: 112 U/L (ref 40–130)
ALT SERPL-CCNC: 22 U/L (ref 0–33)
ANION GAP SERPL CALCULATED.3IONS-SCNC: 14 MEQ/L (ref 7–13)
AST SERPL-CCNC: 34 U/L (ref 0–35)
BACTERIA: NORMAL /HPF
BASOPHILS ABSOLUTE: 0.1 K/UL (ref 0–0.2)
BASOPHILS RELATIVE PERCENT: 1.2 %
BILIRUB SERPL-MCNC: 0.5 MG/DL (ref 0–1.2)
BILIRUBIN URINE: ABNORMAL
BLOOD, URINE: NEGATIVE
BUN BLDV-MCNC: 12 MG/DL (ref 8–23)
CALCIUM SERPL-MCNC: 9.9 MG/DL (ref 8.6–10.2)
CHLORIDE BLD-SCNC: 110 MEQ/L (ref 98–107)
CLARITY: ABNORMAL
CO2: 20 MEQ/L (ref 22–29)
COLOR: YELLOW
CREAT SERPL-MCNC: 0.91 MG/DL (ref 0.5–0.9)
CRYSTALS, UA: NORMAL
EOSINOPHILS ABSOLUTE: 0.2 K/UL (ref 0–0.7)
EOSINOPHILS RELATIVE PERCENT: 2.9 %
EPITHELIAL CELLS, UA: NORMAL /HPF
GFR AFRICAN AMERICAN: >60
GFR NON-AFRICAN AMERICAN: >60
GLOBULIN: 2.3 G/DL (ref 2.3–3.5)
GLUCOSE BLD-MCNC: 77 MG/DL (ref 74–109)
GLUCOSE URINE: 100 MG/DL
HCT VFR BLD CALC: 45.6 % (ref 37–47)
HEMOGLOBIN: 15 G/DL (ref 12–16)
KETONES, URINE: NEGATIVE MG/DL
LACTIC ACID: 1.1 MMOL/L (ref 0.5–2.2)
LEUKOCYTE ESTERASE, URINE: ABNORMAL
LIPASE: 20 U/L (ref 13–60)
LYMPHOCYTES ABSOLUTE: 3.1 K/UL (ref 1–4.8)
LYMPHOCYTES RELATIVE PERCENT: 42.7 %
MCH RBC QN AUTO: 30 PG (ref 27–31.3)
MCHC RBC AUTO-ENTMCNC: 32.8 % (ref 33–37)
MCV RBC AUTO: 91.4 FL (ref 82–100)
MONOCYTES ABSOLUTE: 0.6 K/UL (ref 0.2–0.8)
MONOCYTES RELATIVE PERCENT: 7.9 %
NEUTROPHILS ABSOLUTE: 3.3 K/UL (ref 1.4–6.5)
NEUTROPHILS RELATIVE PERCENT: 45.3 %
NITRITE, URINE: NEGATIVE
PDW BLD-RTO: 13.8 % (ref 11.5–14.5)
PH UA: 5.5 (ref 5–9)
PLATELET # BLD: 253 K/UL (ref 130–400)
POTASSIUM SERPL-SCNC: 3.7 MEQ/L (ref 3.5–5.1)
PROTEIN UA: NEGATIVE MG/DL
RBC # BLD: 4.99 M/UL (ref 4.2–5.4)
RBC UA: NORMAL /HPF (ref 0–2)
SODIUM BLD-SCNC: 144 MEQ/L (ref 132–144)
SPECIFIC GRAVITY UA: 1.03 (ref 1–1.03)
TOTAL PROTEIN: 6.5 G/DL (ref 6.4–8.1)
URINE REFLEX TO CULTURE: YES
UROBILINOGEN, URINE: 1 E.U./DL
WBC # BLD: 7.3 K/UL (ref 4.8–10.8)
WBC UA: NORMAL /HPF (ref 0–5)

## 2018-01-22 PROCEDURE — 87086 URINE CULTURE/COLONY COUNT: CPT

## 2018-01-22 PROCEDURE — 96374 THER/PROPH/DIAG INJ IV PUSH: CPT

## 2018-01-22 PROCEDURE — 6360000002 HC RX W HCPCS: Performed by: PERSONAL EMERGENCY RESPONSE ATTENDANT

## 2018-01-22 PROCEDURE — 36415 COLL VENOUS BLD VENIPUNCTURE: CPT

## 2018-01-22 PROCEDURE — 81001 URINALYSIS AUTO W/SCOPE: CPT

## 2018-01-22 PROCEDURE — 83605 ASSAY OF LACTIC ACID: CPT

## 2018-01-22 PROCEDURE — 99284 EMERGENCY DEPT VISIT MOD MDM: CPT

## 2018-01-22 PROCEDURE — 80053 COMPREHEN METABOLIC PANEL: CPT

## 2018-01-22 PROCEDURE — 83690 ASSAY OF LIPASE: CPT

## 2018-01-22 PROCEDURE — 85025 COMPLETE CBC W/AUTO DIFF WBC: CPT

## 2018-01-22 PROCEDURE — 96375 TX/PRO/DX INJ NEW DRUG ADDON: CPT

## 2018-01-22 PROCEDURE — 74022 RADEX COMPL AQT ABD SERIES: CPT

## 2018-01-22 RX ORDER — MORPHINE SULFATE 4 MG/ML
4 INJECTION, SOLUTION INTRAMUSCULAR; INTRAVENOUS ONCE
Status: COMPLETED | OUTPATIENT
Start: 2018-01-22 | End: 2018-01-22

## 2018-01-22 RX ORDER — ONDANSETRON 2 MG/ML
4 INJECTION INTRAMUSCULAR; INTRAVENOUS ONCE
Status: COMPLETED | OUTPATIENT
Start: 2018-01-22 | End: 2018-01-22

## 2018-01-22 RX ORDER — DICYCLOMINE HYDROCHLORIDE 10 MG/1
10 CAPSULE ORAL EVERY 6 HOURS PRN
Qty: 20 CAPSULE | Refills: 0 | Status: SHIPPED | OUTPATIENT
Start: 2018-01-22 | End: 2018-02-02

## 2018-01-22 RX ADMIN — Medication 4 MG: at 01:39

## 2018-01-22 RX ADMIN — ONDANSETRON 4 MG: 2 INJECTION INTRAMUSCULAR; INTRAVENOUS at 01:37

## 2018-01-22 ASSESSMENT — PAIN SCALES - GENERAL
PAINLEVEL_OUTOF10: 10
PAINLEVEL_OUTOF10: 10

## 2018-01-22 ASSESSMENT — PAIN DESCRIPTION - PAIN TYPE: TYPE: ACUTE PAIN;CHRONIC PAIN

## 2018-01-22 ASSESSMENT — ENCOUNTER SYMPTOMS
VOMITING: 0
NAUSEA: 0
BLOOD IN STOOL: 0
COLOR CHANGE: 0
RHINORRHEA: 0
ABDOMINAL PAIN: 1
COUGH: 0
DIARRHEA: 0
SHORTNESS OF BREATH: 0

## 2018-01-22 ASSESSMENT — PAIN DESCRIPTION - LOCATION: LOCATION: ABDOMEN

## 2018-01-22 NOTE — ED PROVIDER NOTES
[address] [facility] ED  eMERGENCY dEPARTMENT eNCOUnter      Pt Name: Cleo Nava  MRN: [id number]  [place] 1945  Date of evaluation: 1/22/2018  Provider: Deidre Beard, [facility] Tomas Horan is a 67 y.o. female presents to the emergency department with abdominal pain. Patient has had abdominal pain across her umbilicus since May 6269. She has had CAT scans and ultrasounds to evaluate her pain. The etiology is unknown. She is supposed to follow-up with Dr. Jos Kaur from general surgery but has not made an appointment yet. She denies fevers, lightheadedness, chest pain, short of breath, nausea, vomiting, diarrhea. She states she has had regular bowel movements. She denies urinary symptoms. Patient is not taking anything at home for her pain. Patient does have [place] but states its for her fibromyalgia so she does not take it for abdominal pain. She states pain occurs almost every day and at times is worse in the evening. She was on omeprazole but states this causes burning of her stomach. 12-21 CT of abd/pelvis w/ IV contrast: Small umbilical hernia containing mesenteric fat  1-11 US Pelvis: A mildly complex cyst measuring approximately 7 mm is noted deep to the endometrial complex within the posterior aspect of the uterine body    There are aware of findings      HPI    Nursing Notes were reviewed. REVIEW OF SYSTEMS       Review of Systems   Constitutional: Negative for appetite change and fever. HENT: Negative for congestion, drooling and rhinorrhea. Respiratory: Negative for cough and shortness of breath. Cardiovascular: Negative for chest pain. Gastrointestinal: Positive for abdominal pain. Negative for blood in stool, diarrhea, nausea and vomiting. Genitourinary: Negative for difficulty urinating. Musculoskeletal: Negative for neck stiffness. Skin: Negative for color change and rash.    Neurological: Negative for dizziness, syncope, light-headedness, numbness and headaches. PAST MEDICAL HISTORY     Past Medical History:   Diagnosis Date    Anxiety     Asthma     Chronic back pain     Bilateral L5 S1 Radic on emg--surprisingly worse on the left than the right--pt's symptoms and her MRI show worse on the right    Depression     Fibromyalgia     Hyperlipidemia     Hypertension     Osteoarthritis     Type II diabetes mellitus, uncontrolled ([facility]     Unspecified sleep apnea          SURGICAL HISTORY       Past Surgical History:   Procedure Laterality Date    BACK SURGERY      CARDIAC CATHETERIZATION  11/3/14     DR. MIRELES     COLONOSCOPY  08/29/2016    w/polypectomy     EYE SURGERY      AK ESOPHAGOGASTRODUODENOSCOPY TRANSORAL DIAGNOSTIC N/A 3/24/2017    EGD ESOPHAGOGASTRODUODENOSCOPY performed by Estrellita Brewster MD at [address] [place] [address] N/CARPAL TUNNEL SURG Left 6/5/2017    LEFT  CARPAL TUNNEL RELEASE performed by Sary Reynaga MD at [place] [address] ENDOSCOPY  08/26/2016    w/bx          CURRENT MEDICATIONS       Previous Medications    ALBUTEROL SULFATE  (90 BASE) MCG/ACT INHALER    Inhale 2 puffs into the lungs every 6 hours as needed for Wheezing    AMLODIPINE (NORVASC) 5 MG TABLET    TAKE 1 TABLET BY MOUTH ONCE DAILY    HYDROCODONE-ACETAMINOPHEN (NORCO)  MG PER TABLET    Take 1 tablet by mouth every 6 hours as needed for Pain  MAX 2 PER DAY/#60 PER MONTH. DO NOT GET NARCOTIC MEDICATIONS FROM ANY OTHER PROVIDER. Cayetano Credit Date: 12/21/17    INSULIN 70-30 (HUMULIN 70/30) (70-30) 100 UNIT PER ML INJECTION VIAL    Inject 60 units bid please give 4 vials for a 30 day supply    METOPROLOL SUCCINATE (TOPROL XL) 200 MG EXTENDED RELEASE TABLET    TAKE 1 TABLET BY MOUTH  DAILY    OMEPRAZOLE (PRILOSEC) 20 MG DELAYED RELEASE CAPSULE    Take 1 capsule by mouth 2 times daily    PAROXETINE (PAXIL) 40 MG TABLET    TAKE 1 TABLET BY Patient was talkative in no apparent distress throughout HPI. when I informed her I was going to palpate her abdomen, she started becoming tearful. There is no rebound rigidity, no ecchymosis, no pulsatile mass or bruit   Musculoskeletal: Normal range of motion. Neurological: She is alert and oriented to person, place, and time. She has normal reflexes. Skin: Skin is warm and dry. No rash noted. Psychiatric: She has a normal mood and affect. Her behavior is normal. Judgment and thought content normal.       DIAGNOSTIC RESULTS     EKG: All EKG's are interpreted by the Emergency Department Physician who either signs or Co-signs this chart in the absence of a cardiologist.      RADIOLOGY:   Non-plain film images such as CT, Ultrasound and MRI are read by the radiologist. Plain radiographic images are visualized and preliminarily interpreted by the emergency physician with the below findings:    Interpretation per the Radiologist below, if available at the time of this note:    XR Acute Abd Series Chest 1 VW    (Results Pending)           LABS:  Labs Reviewed   COMPREHENSIVE METABOLIC PANEL - Abnormal; Notable for the following:        Result Value    Chloride 110 (*)     CO2 20 (*)     Anion Gap 14 (*)     CREATININE 0.91 (*)     All other components within normal limits   CBC WITH AUTO DIFFERENTIAL - Abnormal; Notable for the following:     MCHC 32.8 (*)     All other components within normal limits   URINE RT REFLEX TO CULTURE - Abnormal; Notable for the following:     Clarity, UA CLOUDY (*)     Glucose, Ur 100 (*)     Bilirubin Urine SMALL (*)     Leukocyte Esterase, Urine TRACE (*)     All other components within normal limits   URINE CULTURE   LACTIC ACID, PLASMA   LIPASE   MICROSCOPIC URINALYSIS       All other labs were within normal range or not returned as of this dictation.     EMERGENCY DEPARTMENT COURSE and DIFFERENTIAL DIAGNOSIS/MDM:   Vitals:    Vitals:    01/22/18 0058 01/22/18 0143   BP: (!) 146/81 (!) 155/77   Pulse: 84 68   Resp: 16 18   Temp: 98.3 °F (36.8 °C)    TempSrc: Oral    SpO2: 100% 99%   Weight: 132 lb (59.9 kg)    Height: 4' 11\" (1.499 m)          MDM    X-ray shows note process. Blood work and urine are unremarkable. On reassessment patient is laying comfortably in cart. Vital signs are not concerning at this time. Possibility of IBS was discussed with patient. She'll be sent home with Bentyl. Patient appears nontoxic. Standard anticipatory guidance given to patient upon discharge. Have given them a specific time frame in which to follow-up and who to follow-up with. I have also advised them that they should return to the emergency department if they get worse, or not getting better or develop any new or concerning symptoms. Patient demonstrates understanding. CRITICAL CARE TIME   Total Critical Care time was 0 minutes, excluding separately reportable procedures. There was a high probability of clinically significant/life threatening deterioration in the patient's condition which required my urgent intervention. Procedures    FINAL IMPRESSION      1. Periumbilical abdominal pain          DISPOSITION/PLAN   DISPOSITION Decision To Discharge 01/22/2018 02:26:53 AM      PATIENT REFERRED TO:  April Edgar, [address] [place] [address], [address]  [address]  [phone]    Schedule an appointment as soon as possible for a visit         DISCHARGE MEDICATIONS:  New Prescriptions    DICYCLOMINE (BENTYL) 10 MG CAPSULE    Take 1 capsule by mouth every 6 hours as needed (cramps)          (Please note that portions of this note were completed with a voice recognition program.  Efforts were made to edit the dictations but occasionally words are mis-transcribed. )    DIANE Freeman (electronically signed)  Emergency Physician [address]  01/22/18 7971

## 2018-01-22 NOTE — ED TRIAGE NOTES
Patient presents to the ED with c/o Abdominal Pain, onset 3 months. Patient stated she has seen her PCP and is currently following up with Dr. Cecily Fonseca to see what is causing this pain. PCP stated patient may have a hernia. Patient alert and oriented x3  Respirations equal and unlabored. Skin pink, warm,dry.

## 2018-01-23 LAB — URINE CULTURE, ROUTINE: NORMAL

## 2018-01-31 DIAGNOSIS — M54.17 LUMBOSACRAL RADICULOPATHY AT L5: Chronic | ICD-10-CM

## 2018-01-31 DIAGNOSIS — Z79.899 HIGH RISK MEDICATION USE: Chronic | ICD-10-CM

## 2018-01-31 DIAGNOSIS — M47.26 OSTEOARTHRITIS OF SPINE WITH RADICULOPATHY, LUMBAR REGION: ICD-10-CM

## 2018-01-31 DIAGNOSIS — M48.062 SPINAL STENOSIS OF LUMBAR REGION WITH NEUROGENIC CLAUDICATION: Chronic | ICD-10-CM

## 2018-01-31 RX ORDER — HYDROCODONE BITARTRATE AND ACETAMINOPHEN 10; 325 MG/1; MG/1
1 TABLET ORAL EVERY 6 HOURS PRN
Qty: 60 TABLET | Refills: 0 | Status: SHIPPED | OUTPATIENT
Start: 2018-01-31 | End: 2018-02-22 | Stop reason: SDDI

## 2018-01-31 NOTE — PROGRESS NOTES
NARCOTIC MEDICATIONS FROM ANY OTHER PROVIDER. Gerardo Box Date: 1/31/18 60 tablet 0    dicyclomine (BENTYL) 10 MG capsule Take 1 capsule by mouth every 6 hours as needed (cramps) 20 capsule 0    omeprazole (PRILOSEC) 20 MG delayed release capsule Take 1 capsule by mouth 2 times daily 30 capsule 0    rosuvastatin (CRESTOR) 20 MG tablet Take 1 tablet by mouth nightly 90 tablet 0    amLODIPine (NORVASC) 5 MG tablet TAKE 1 TABLET BY MOUTH ONCE DAILY 90 tablet 0    PARoxetine (PAXIL) 40 MG tablet TAKE 1 TABLET BY MOUTH ONCE DAILY IN THE MORNING 90 tablet 0    metoprolol succinate (TOPROL XL) 200 MG extended release tablet TAKE 1 TABLET BY MOUTH  DAILY 90 tablet 0    albuterol sulfate  (90 Base) MCG/ACT inhaler Inhale 2 puffs into the lungs every 6 hours as needed for Wheezing 3 Inhaler 0    insulin 70-30 (HUMULIN 70/30) (70-30) 100 UNIT per ML injection vial Inject 60 units bid please give 4 vials for a 30 day supply 4 vial 3     No current facility-administered medications for this visit. Allergies   Allergen Reactions    Darvon [Propoxyphene Hcl]     Ibuprofen Nausea Only    Metformin And Related      Diarrhea      Norco [Hydrocodone-Acetaminophen] Nausea Only       Review of Systems  There has been some nausea but little vomiting. She is unemployed. She lives with her . She is a smoker (1/2 PPD). Objective:   Physical Exam     She does not appear ill or toxic. Breath sounds are clear. The cardiac exam is fine. The abdomen is obese. Bowel sounds are fine. There is diffuse tenderness with guarding all over the mid part of the central abdomen. There is a tiny umbilical hernia which is tender. There are no abdominal masses noted. The liver edge is not felt. I reviewed the evaluation done in an ED visit on 1/22/2018. She presented with shaquille umbilical pain. A CMP showed slight elevation of the creatinine (.91) with elevation of the chloride (110) and decrease in CO2 (14).  The CBC

## 2018-02-02 ENCOUNTER — OFFICE VISIT (OUTPATIENT)
Dept: SURGERY | Age: 73
End: 2018-02-02
Payer: MEDICARE

## 2018-02-02 VITALS
WEIGHT: 132 LBS | BODY MASS INDEX: 26.61 KG/M2 | SYSTOLIC BLOOD PRESSURE: 160 MMHG | TEMPERATURE: 97.5 F | DIASTOLIC BLOOD PRESSURE: 92 MMHG | HEIGHT: 59 IN

## 2018-02-02 DIAGNOSIS — G89.29 ABDOMINAL PAIN, CHRONIC, GENERALIZED: ICD-10-CM

## 2018-02-02 DIAGNOSIS — K42.9 UMBILICAL HERNIA WITHOUT OBSTRUCTION AND WITHOUT GANGRENE: ICD-10-CM

## 2018-02-02 DIAGNOSIS — R10.84 ABDOMINAL PAIN, CHRONIC, GENERALIZED: ICD-10-CM

## 2018-02-02 PROCEDURE — 1090F PRES/ABSN URINE INCON ASSESS: CPT | Performed by: SURGERY

## 2018-02-02 PROCEDURE — G8484 FLU IMMUNIZE NO ADMIN: HCPCS | Performed by: SURGERY

## 2018-02-02 PROCEDURE — 4040F PNEUMOC VAC/ADMIN/RCVD: CPT | Performed by: SURGERY

## 2018-02-02 PROCEDURE — 1123F ACP DISCUSS/DSCN MKR DOCD: CPT | Performed by: SURGERY

## 2018-02-02 PROCEDURE — 4004F PT TOBACCO SCREEN RCVD TLK: CPT | Performed by: SURGERY

## 2018-02-02 PROCEDURE — G8419 CALC BMI OUT NRM PARAM NOF/U: HCPCS | Performed by: SURGERY

## 2018-02-02 PROCEDURE — 3014F SCREEN MAMMO DOC REV: CPT | Performed by: SURGERY

## 2018-02-02 PROCEDURE — G8427 DOCREV CUR MEDS BY ELIG CLIN: HCPCS | Performed by: SURGERY

## 2018-02-02 PROCEDURE — G8400 PT W/DXA NO RESULTS DOC: HCPCS | Performed by: SURGERY

## 2018-02-02 PROCEDURE — 99202 OFFICE O/P NEW SF 15 MIN: CPT | Performed by: SURGERY

## 2018-02-02 PROCEDURE — 3017F COLORECTAL CA SCREEN DOC REV: CPT | Performed by: SURGERY

## 2018-02-07 RX ORDER — SODIUM CHLORIDE 0.9 % (FLUSH) 0.9 %
10 SYRINGE (ML) INJECTION PRN
Status: CANCELLED | OUTPATIENT
Start: 2018-02-07

## 2018-02-07 RX ORDER — SODIUM CHLORIDE 0.9 % (FLUSH) 0.9 %
10 SYRINGE (ML) INJECTION EVERY 12 HOURS SCHEDULED
Status: CANCELLED | OUTPATIENT
Start: 2018-02-07

## 2018-02-07 RX ORDER — SODIUM CHLORIDE, SODIUM LACTATE, POTASSIUM CHLORIDE, CALCIUM CHLORIDE 600; 310; 30; 20 MG/100ML; MG/100ML; MG/100ML; MG/100ML
INJECTION, SOLUTION INTRAVENOUS CONTINUOUS
Status: CANCELLED | OUTPATIENT
Start: 2018-02-07

## 2018-02-08 ENCOUNTER — ANESTHESIA (OUTPATIENT)
Dept: OPERATING ROOM | Age: 73
End: 2018-02-08
Payer: MEDICARE

## 2018-02-08 ENCOUNTER — ANESTHESIA EVENT (OUTPATIENT)
Dept: OPERATING ROOM | Age: 73
End: 2018-02-08
Payer: MEDICARE

## 2018-02-08 ENCOUNTER — HOSPITAL ENCOUNTER (OUTPATIENT)
Age: 73
Setting detail: OUTPATIENT SURGERY
Discharge: HOME | End: 2018-02-08
Attending: SURGERY | Admitting: SURGERY
Payer: MEDICARE

## 2018-02-08 VITALS
OXYGEN SATURATION: 100 % | TEMPERATURE: 97 F | BODY MASS INDEX: 26.61 KG/M2 | HEART RATE: 76 BPM | HEIGHT: 59 IN | WEIGHT: 132 LBS | SYSTOLIC BLOOD PRESSURE: 185 MMHG | DIASTOLIC BLOOD PRESSURE: 84 MMHG | RESPIRATION RATE: 16 BRPM

## 2018-02-08 VITALS — OXYGEN SATURATION: 98 % | DIASTOLIC BLOOD PRESSURE: 91 MMHG | TEMPERATURE: 97 F | SYSTOLIC BLOOD PRESSURE: 150 MMHG

## 2018-02-08 DIAGNOSIS — R10.84 ABDOMINAL PAIN, CHRONIC, GENERALIZED: Primary | ICD-10-CM

## 2018-02-08 DIAGNOSIS — G89.18 POST-OP PAIN: ICD-10-CM

## 2018-02-08 DIAGNOSIS — G89.29 ABDOMINAL PAIN, CHRONIC, GENERALIZED: Primary | ICD-10-CM

## 2018-02-08 LAB
EKG ATRIAL RATE: 76 BPM
EKG P AXIS: 55 DEGREES
EKG P-R INTERVAL: 118 MS
EKG Q-T INTERVAL: 410 MS
EKG QRS DURATION: 72 MS
EKG QTC CALCULATION (BAZETT): 461 MS
EKG R AXIS: 23 DEGREES
EKG T AXIS: 150 DEGREES
EKG VENTRICULAR RATE: 76 BPM
GLUCOSE BLD-MCNC: 224 MG/DL (ref 60–115)
GLUCOSE BLD-MCNC: 229 MG/DL (ref 60–115)
PERFORMED ON: ABNORMAL
PERFORMED ON: ABNORMAL

## 2018-02-08 PROCEDURE — 6360000002 HC RX W HCPCS: Performed by: ANESTHESIOLOGY

## 2018-02-08 PROCEDURE — 3600000004 HC SURGERY LEVEL 4 BASE: Performed by: SURGERY

## 2018-02-08 PROCEDURE — 6370000000 HC RX 637 (ALT 250 FOR IP): Performed by: SURGERY

## 2018-02-08 PROCEDURE — 7100000011 HC PHASE II RECOVERY - ADDTL 15 MIN: Performed by: SURGERY

## 2018-02-08 PROCEDURE — 2580000003 HC RX 258: Performed by: NURSE ANESTHETIST, CERTIFIED REGISTERED

## 2018-02-08 PROCEDURE — 7100000000 HC PACU RECOVERY - FIRST 15 MIN: Performed by: SURGERY

## 2018-02-08 PROCEDURE — 2500000003 HC RX 250 WO HCPCS: Performed by: ANESTHESIOLOGY

## 2018-02-08 PROCEDURE — 2500000003 HC RX 250 WO HCPCS: Performed by: NURSE ANESTHETIST, CERTIFIED REGISTERED

## 2018-02-08 PROCEDURE — 3700000001 HC ADD 15 MINUTES (ANESTHESIA): Performed by: SURGERY

## 2018-02-08 PROCEDURE — 2580000003 HC RX 258: Performed by: SURGERY

## 2018-02-08 PROCEDURE — A6402 STERILE GAUZE <= 16 SQ IN: HCPCS | Performed by: SURGERY

## 2018-02-08 PROCEDURE — 2500000003 HC RX 250 WO HCPCS: Performed by: SURGERY

## 2018-02-08 PROCEDURE — 6360000002 HC RX W HCPCS: Performed by: NURSE ANESTHETIST, CERTIFIED REGISTERED

## 2018-02-08 PROCEDURE — 93005 ELECTROCARDIOGRAM TRACING: CPT

## 2018-02-08 PROCEDURE — 3700000000 HC ANESTHESIA ATTENDED CARE: Performed by: SURGERY

## 2018-02-08 PROCEDURE — 2580000003 HC RX 258: Performed by: ANESTHESIOLOGY

## 2018-02-08 PROCEDURE — 49320 DIAG LAPARO SEPARATE PROC: CPT | Performed by: SURGERY

## 2018-02-08 PROCEDURE — 6360000002 HC RX W HCPCS: Performed by: SURGERY

## 2018-02-08 PROCEDURE — 6370000000 HC RX 637 (ALT 250 FOR IP): Performed by: ANESTHESIOLOGY

## 2018-02-08 PROCEDURE — 7100000001 HC PACU RECOVERY - ADDTL 15 MIN: Performed by: SURGERY

## 2018-02-08 PROCEDURE — 3600000014 HC SURGERY LEVEL 4 ADDTL 15MIN: Performed by: SURGERY

## 2018-02-08 PROCEDURE — 7100000010 HC PHASE II RECOVERY - FIRST 15 MIN: Performed by: SURGERY

## 2018-02-08 RX ORDER — PROPOFOL 10 MG/ML
INJECTION, EMULSION INTRAVENOUS PRN
Status: DISCONTINUED | OUTPATIENT
Start: 2018-02-08 | End: 2018-02-08 | Stop reason: SDUPTHER

## 2018-02-08 RX ORDER — ESMOLOL HYDROCHLORIDE 10 MG/ML
INJECTION INTRAVENOUS PRN
Status: DISCONTINUED | OUTPATIENT
Start: 2018-02-08 | End: 2018-02-08 | Stop reason: SDUPTHER

## 2018-02-08 RX ORDER — FENTANYL CITRATE 50 UG/ML
50 INJECTION, SOLUTION INTRAMUSCULAR; INTRAVENOUS EVERY 10 MIN PRN
Status: DISCONTINUED | OUTPATIENT
Start: 2018-02-08 | End: 2018-02-08 | Stop reason: HOSPADM

## 2018-02-08 RX ORDER — HYDROCODONE BITARTRATE AND ACETAMINOPHEN 10; 325 MG/1; MG/1
1 TABLET ORAL EVERY 6 HOURS PRN
Qty: 28 TABLET | Refills: 0 | Status: SHIPPED | OUTPATIENT
Start: 2018-02-08 | End: 2018-04-25 | Stop reason: DRUGHIGH

## 2018-02-08 RX ORDER — ONDANSETRON 2 MG/ML
4 INJECTION INTRAMUSCULAR; INTRAVENOUS
Status: DISCONTINUED | OUTPATIENT
Start: 2018-02-08 | End: 2018-02-08 | Stop reason: HOSPADM

## 2018-02-08 RX ORDER — LIDOCAINE HYDROCHLORIDE 10 MG/ML
1 INJECTION, SOLUTION EPIDURAL; INFILTRATION; INTRACAUDAL; PERINEURAL
Status: COMPLETED | OUTPATIENT
Start: 2018-02-08 | End: 2018-02-08

## 2018-02-08 RX ORDER — WOUND DRESSING ADHESIVE - LIQUID
LIQUID MISCELLANEOUS PRN
Status: DISCONTINUED | OUTPATIENT
Start: 2018-02-08 | End: 2018-02-08 | Stop reason: HOSPADM

## 2018-02-08 RX ORDER — SODIUM CHLORIDE 0.9 % (FLUSH) 0.9 %
10 SYRINGE (ML) INJECTION PRN
Status: DISCONTINUED | OUTPATIENT
Start: 2018-02-08 | End: 2018-02-08 | Stop reason: HOSPADM

## 2018-02-08 RX ORDER — MEPERIDINE HYDROCHLORIDE 25 MG/ML
12.5 INJECTION INTRAMUSCULAR; INTRAVENOUS; SUBCUTANEOUS EVERY 5 MIN PRN
Status: DISCONTINUED | OUTPATIENT
Start: 2018-02-08 | End: 2018-02-08 | Stop reason: HOSPADM

## 2018-02-08 RX ORDER — HYDROCODONE BITARTRATE AND ACETAMINOPHEN 5; 325 MG/1; MG/1
1 TABLET ORAL PRN
Status: COMPLETED | OUTPATIENT
Start: 2018-02-08 | End: 2018-02-08

## 2018-02-08 RX ORDER — HYDROCODONE BITARTRATE AND ACETAMINOPHEN 5; 325 MG/1; MG/1
2 TABLET ORAL PRN
Status: COMPLETED | OUTPATIENT
Start: 2018-02-08 | End: 2018-02-08

## 2018-02-08 RX ORDER — HYDRALAZINE HYDROCHLORIDE 20 MG/ML
INJECTION INTRAMUSCULAR; INTRAVENOUS PRN
Status: DISCONTINUED | OUTPATIENT
Start: 2018-02-08 | End: 2018-02-08 | Stop reason: SDUPTHER

## 2018-02-08 RX ORDER — ACETAMINOPHEN 650 MG/1
650 SUPPOSITORY RECTAL EVERY 4 HOURS PRN
Status: DISCONTINUED | OUTPATIENT
Start: 2018-02-08 | End: 2018-02-08 | Stop reason: HOSPADM

## 2018-02-08 RX ORDER — SODIUM CHLORIDE 0.9 % (FLUSH) 0.9 %
10 SYRINGE (ML) INJECTION EVERY 12 HOURS SCHEDULED
Status: DISCONTINUED | OUTPATIENT
Start: 2018-02-08 | End: 2018-02-08 | Stop reason: HOSPADM

## 2018-02-08 RX ORDER — BUPIVACAINE HYDROCHLORIDE 5 MG/ML
INJECTION, SOLUTION EPIDURAL; INTRACAUDAL PRN
Status: DISCONTINUED | OUTPATIENT
Start: 2018-02-08 | End: 2018-02-08 | Stop reason: HOSPADM

## 2018-02-08 RX ORDER — ONDANSETRON 2 MG/ML
INJECTION INTRAMUSCULAR; INTRAVENOUS PRN
Status: DISCONTINUED | OUTPATIENT
Start: 2018-02-08 | End: 2018-02-08 | Stop reason: SDUPTHER

## 2018-02-08 RX ORDER — MORPHINE SULFATE 2 MG/ML
2 INJECTION, SOLUTION INTRAMUSCULAR; INTRAVENOUS
Status: DISCONTINUED | OUTPATIENT
Start: 2018-02-08 | End: 2018-02-08 | Stop reason: HOSPADM

## 2018-02-08 RX ORDER — LIDOCAINE HYDROCHLORIDE 20 MG/ML
INJECTION, SOLUTION INFILTRATION; PERINEURAL PRN
Status: DISCONTINUED | OUTPATIENT
Start: 2018-02-08 | End: 2018-02-08 | Stop reason: SDUPTHER

## 2018-02-08 RX ORDER — MORPHINE SULFATE 4 MG/ML
4 INJECTION, SOLUTION INTRAMUSCULAR; INTRAVENOUS
Status: DISCONTINUED | OUTPATIENT
Start: 2018-02-08 | End: 2018-02-08 | Stop reason: HOSPADM

## 2018-02-08 RX ORDER — SODIUM CHLORIDE 9 MG/ML
INJECTION, SOLUTION INTRAVENOUS CONTINUOUS
Status: DISCONTINUED | OUTPATIENT
Start: 2018-02-08 | End: 2018-02-08 | Stop reason: HOSPADM

## 2018-02-08 RX ORDER — FENTANYL CITRATE 50 UG/ML
INJECTION, SOLUTION INTRAMUSCULAR; INTRAVENOUS PRN
Status: DISCONTINUED | OUTPATIENT
Start: 2018-02-08 | End: 2018-02-08 | Stop reason: SDUPTHER

## 2018-02-08 RX ORDER — SODIUM CHLORIDE, SODIUM LACTATE, POTASSIUM CHLORIDE, CALCIUM CHLORIDE 600; 310; 30; 20 MG/100ML; MG/100ML; MG/100ML; MG/100ML
INJECTION, SOLUTION INTRAVENOUS CONTINUOUS
Status: DISCONTINUED | OUTPATIENT
Start: 2018-02-08 | End: 2018-02-08 | Stop reason: HOSPADM

## 2018-02-08 RX ORDER — METOCLOPRAMIDE HYDROCHLORIDE 5 MG/ML
10 INJECTION INTRAMUSCULAR; INTRAVENOUS
Status: DISCONTINUED | OUTPATIENT
Start: 2018-02-08 | End: 2018-02-08 | Stop reason: HOSPADM

## 2018-02-08 RX ORDER — LIDOCAINE HYDROCHLORIDE 10 MG/ML
1 INJECTION, SOLUTION EPIDURAL; INFILTRATION; INTRACAUDAL; PERINEURAL
Status: DISCONTINUED | OUTPATIENT
Start: 2018-02-08 | End: 2018-02-08 | Stop reason: HOSPADM

## 2018-02-08 RX ORDER — SODIUM CHLORIDE, SODIUM LACTATE, POTASSIUM CHLORIDE, CALCIUM CHLORIDE 600; 310; 30; 20 MG/100ML; MG/100ML; MG/100ML; MG/100ML
INJECTION, SOLUTION INTRAVENOUS CONTINUOUS PRN
Status: DISCONTINUED | OUTPATIENT
Start: 2018-02-08 | End: 2018-02-08 | Stop reason: SDUPTHER

## 2018-02-08 RX ORDER — ROCURONIUM BROMIDE 10 MG/ML
INJECTION, SOLUTION INTRAVENOUS PRN
Status: DISCONTINUED | OUTPATIENT
Start: 2018-02-08 | End: 2018-02-08 | Stop reason: SDUPTHER

## 2018-02-08 RX ORDER — ONDANSETRON 2 MG/ML
4 INJECTION INTRAMUSCULAR; INTRAVENOUS EVERY 6 HOURS PRN
Status: DISCONTINUED | OUTPATIENT
Start: 2018-02-08 | End: 2018-02-08 | Stop reason: HOSPADM

## 2018-02-08 RX ORDER — DIPHENHYDRAMINE HYDROCHLORIDE 50 MG/ML
12.5 INJECTION INTRAMUSCULAR; INTRAVENOUS
Status: DISCONTINUED | OUTPATIENT
Start: 2018-02-08 | End: 2018-02-08 | Stop reason: HOSPADM

## 2018-02-08 RX ORDER — SODIUM CHLORIDE 9 MG/ML
INJECTION, SOLUTION INTRAVENOUS CONTINUOUS PRN
Status: DISCONTINUED | OUTPATIENT
Start: 2018-02-08 | End: 2018-02-08 | Stop reason: SDUPTHER

## 2018-02-08 RX ORDER — ACETAMINOPHEN 325 MG/1
650 TABLET ORAL EVERY 4 HOURS PRN
Status: DISCONTINUED | OUTPATIENT
Start: 2018-02-08 | End: 2018-02-08 | Stop reason: HOSPADM

## 2018-02-08 RX ORDER — MAGNESIUM HYDROXIDE 1200 MG/15ML
LIQUID ORAL CONTINUOUS PRN
Status: DISCONTINUED | OUTPATIENT
Start: 2018-02-08 | End: 2018-02-08 | Stop reason: HOSPADM

## 2018-02-08 RX ADMIN — Medication 0.5 MG: at 13:52

## 2018-02-08 RX ADMIN — LIDOCAINE HYDROCHLORIDE 60 MG: 20 INJECTION, SOLUTION INFILTRATION; PERINEURAL at 12:25

## 2018-02-08 RX ADMIN — PROPOFOL 20 MG: 10 INJECTION, EMULSION INTRAVENOUS at 13:05

## 2018-02-08 RX ADMIN — HYDROCODONE BITARTRATE AND ACETAMINOPHEN 1 TABLET: 5; 325 TABLET ORAL at 14:30

## 2018-02-08 RX ADMIN — CEFTRIAXONE SODIUM 1 G: 10 INJECTION, POWDER, FOR SOLUTION INTRAVENOUS at 12:19

## 2018-02-08 RX ADMIN — ESMOLOL HYDROCHLORIDE 20 MG: 100 INJECTION, SOLUTION INTRAVENOUS at 12:55

## 2018-02-08 RX ADMIN — PROPOFOL 20 MG: 10 INJECTION, EMULSION INTRAVENOUS at 13:03

## 2018-02-08 RX ADMIN — ROCURONIUM BROMIDE 30 MG: 10 INJECTION INTRAVENOUS at 12:25

## 2018-02-08 RX ADMIN — HYDRALAZINE HYDROCHLORIDE 5 MG: 20 INJECTION INTRAMUSCULAR; INTRAVENOUS at 13:01

## 2018-02-08 RX ADMIN — SODIUM CHLORIDE: 9 INJECTION, SOLUTION INTRAVENOUS at 11:59

## 2018-02-08 RX ADMIN — LIDOCAINE HYDROCHLORIDE 0.1 ML: 10 INJECTION, SOLUTION EPIDURAL; INFILTRATION; INTRACAUDAL; PERINEURAL at 11:58

## 2018-02-08 RX ADMIN — SODIUM CHLORIDE, POTASSIUM CHLORIDE, SODIUM LACTATE AND CALCIUM CHLORIDE: 600; 310; 30; 20 INJECTION, SOLUTION INTRAVENOUS at 13:15

## 2018-02-08 RX ADMIN — ESMOLOL HYDROCHLORIDE 20 MG: 100 INJECTION, SOLUTION INTRAVENOUS at 12:27

## 2018-02-08 RX ADMIN — ONDANSETRON 4 MG: 2 INJECTION INTRAMUSCULAR; INTRAVENOUS at 12:56

## 2018-02-08 RX ADMIN — PROPOFOL 150 MG: 10 INJECTION, EMULSION INTRAVENOUS at 12:25

## 2018-02-08 RX ADMIN — SUGAMMADEX 120 MG: 100 INJECTION, SOLUTION INTRAVENOUS at 13:01

## 2018-02-08 RX ADMIN — FENTANYL CITRATE 50 MCG: 50 INJECTION, SOLUTION INTRAMUSCULAR; INTRAVENOUS at 12:25

## 2018-02-08 RX ADMIN — PROPOFOL 20 MG: 10 INJECTION, EMULSION INTRAVENOUS at 13:01

## 2018-02-08 RX ADMIN — ESMOLOL HYDROCHLORIDE 20 MG: 100 INJECTION, SOLUTION INTRAVENOUS at 12:29

## 2018-02-08 RX ADMIN — SODIUM CHLORIDE: 900 INJECTION, SOLUTION INTRAVENOUS at 12:19

## 2018-02-08 ASSESSMENT — PULMONARY FUNCTION TESTS
PIF_VALUE: 19
PIF_VALUE: 19
PIF_VALUE: 24
PIF_VALUE: 26
PIF_VALUE: 1
PIF_VALUE: 3
PIF_VALUE: 3
PIF_VALUE: 2
PIF_VALUE: 32
PIF_VALUE: 4
PIF_VALUE: 24
PIF_VALUE: 26
PIF_VALUE: 3
PIF_VALUE: 21
PIF_VALUE: 34
PIF_VALUE: 26
PIF_VALUE: 27
PIF_VALUE: 28
PIF_VALUE: 26
PIF_VALUE: 26
PIF_VALUE: 27
PIF_VALUE: 26
PIF_VALUE: 24
PIF_VALUE: 25
PIF_VALUE: 15
PIF_VALUE: 3
PIF_VALUE: 3
PIF_VALUE: 24
PIF_VALUE: 3
PIF_VALUE: 19
PIF_VALUE: 3
PIF_VALUE: 25
PIF_VALUE: 5
PIF_VALUE: 30
PIF_VALUE: 25
PIF_VALUE: 15
PIF_VALUE: 19
PIF_VALUE: 26
PIF_VALUE: 3
PIF_VALUE: 32
PIF_VALUE: 3
PIF_VALUE: 27
PIF_VALUE: 26
PIF_VALUE: 1
PIF_VALUE: 33
PIF_VALUE: 17
PIF_VALUE: 17
PIF_VALUE: 26
PIF_VALUE: 17
PIF_VALUE: 25
PIF_VALUE: 1
PIF_VALUE: 15
PIF_VALUE: 4
PIF_VALUE: 15
PIF_VALUE: 25
PIF_VALUE: 3
PIF_VALUE: 28
PIF_VALUE: 19
PIF_VALUE: 3

## 2018-02-08 ASSESSMENT — PAIN SCALES - GENERAL
PAINLEVEL_OUTOF10: 7
PAINLEVEL_OUTOF10: 5
PAINLEVEL_OUTOF10: 3
PAINLEVEL_OUTOF10: 6
PAINLEVEL_OUTOF10: 6

## 2018-02-08 ASSESSMENT — ENCOUNTER SYMPTOMS
SHORTNESS OF BREATH: 1
DYSPNEA ACTIVITY LEVEL: NO INTERVAL CHANGE

## 2018-02-08 ASSESSMENT — PAIN - FUNCTIONAL ASSESSMENT: PAIN_FUNCTIONAL_ASSESSMENT: 0-10

## 2018-02-08 NOTE — BRIEF OP NOTE
Brief Postoperative Note  ______________________________________________________________    Patient: Mariana Myles  YOB: 1945  MRN: [id number]  Date of Procedure: 2/8/2018    Pre-Op Diagnosis: PERIUMBILICAL,  ABDOMINAL PAIN, UMBILICAL HERNIA    Post-Op Diagnosis: Same       Procedure(s):  DIAGNOSTIC LAPAROSCOPY  REPAIR UMBILICAL HERNIA    Anesthesia: General    Surgeon(s):  Remy Newton MD    Staff:  First Assistant: Moriah Salamanca  Scrub Person First: Toy Colon     Estimated Blood Loss: minimal mL    Complications: None    Specimens:   * No specimens in log *    Implants:  * No implants in log *      Drains:      Findings: No intra abdominal pathology noted. Tiny UH noted and repaired.      Remy Newton MD  Date: 2/8/2018  Time: 1:05 PM

## 2018-02-08 NOTE — ANESTHESIA PRE PROCEDURE
Department of Anesthesiology  Preprocedure Note       Name:  Vickie Lazar   Age:  67 y.o.  :  1945                                          MRN:  [id number]         Date:  2018      Surgeon: Mary Olivas):  Agata Patel MD    Procedure: Procedure(s):  DIAGNOSTIC LAPAROSCOPY  REPAIR UMBILICAL HERNIA , STIRRUPS (LABS 18) PAT PHONE CALL    Medications prior to admission:   Prior to Admission medications    Medication Sig Start Date End Date Taking? Authorizing Provider   HYDROcodone-acetaminophen (NORCO)  MG per tablet Take 1 tablet by mouth every 6 hours as needed for Pain for up to 30 days MAX 2 PER DAY/#60 PER MONTH. DO NOT GET NARCOTIC MEDICATIONS FROM ANY OTHER PROVIDER. Earlis Credit Date: 1/31/18 1/31/18 3/2/18 Yes Oralia Collado DO   omeprazole (PRILOSEC) 20 MG delayed release capsule Take 1 capsule by mouth 2 times daily 18  Yes Sherrie Pike MD   rosuvastatin (CRESTOR) 20 MG tablet Take 1 tablet by mouth nightly 18  Yes Sherrie Pike MD   amLODIPine (NORVASC) 5 MG tablet TAKE 1 TABLET BY MOUTH ONCE DAILY 17  Yes Sherrie Pike MD   PARoxetine (PAXIL) 40 MG tablet TAKE 1 TABLET BY MOUTH ONCE DAILY IN THE MORNING 17  Yes Sherrie Pike MD   metoprolol succinate (TOPROL XL) 200 MG extended release tablet TAKE 1 TABLET BY MOUTH  DAILY 17  Yes Sherrie Pike MD   albuterol sulfate  (90 Base) MCG/ACT inhaler Inhale 2 puffs into the lungs every 6 hours as needed for Wheezing 10/17/17  Yes Sherrie Pike MD   insulin 70-30 (HUMULIN 70/30) (70-30) 100 UNIT per ML injection vial Inject 60 units bid please give 4 vials for a 30 day supply 17  Yes Yelena Ceja MD       Current medications:    Current Facility-Administered Medications   Medication Dose Route Frequency Provider Last Rate Last Dose    fentaNYL (SUBLIMAZE) injection 50 mcg  50 mcg Intravenous Q10 Min PRN Maylin Loco MD        HYDROmorphone (DILAUDID) injection 0.5 mg GASTROINTESTINAL ENDOSCOPY  08/26/2016    w/bx        Social History:    Social History   Substance Use Topics    Smoking status: Light Tobacco Smoker     Years: 15.00     Types: Cigarettes     Start date: 11/30/2017    Smokeless tobacco: Never Used      Comment: smokes about 4 cigarettes a day    Alcohol use No                                Ready to quit: Not Answered  Counseling given: Not Answered      Vital Signs (Current): There were no vitals filed for this visit. BP Readings from Last 3 Encounters:   02/02/18 (!) 160/92   01/22/18 (!) 157/68   01/19/18 130/72       NPO Status:                                                                                 BMI:   Wt Readings from Last 3 Encounters:   02/02/18 132 lb (59.9 kg)   01/22/18 132 lb (59.9 kg)   01/19/18 132 lb (59.9 kg)     There is no height or weight on file to calculate BMI.    CBC:   Lab Results   Component Value Date    WBC 7.3 01/22/2018    RBC 4.99 01/22/2018    HGB 15.0 01/22/2018    HCT 45.6 01/22/2018    MCV 91.4 01/22/2018    RDW 13.8 01/22/2018     01/22/2018       CMP:   Lab Results   Component Value Date     01/22/2018    K 3.7 01/22/2018     01/22/2018    CO2 20 01/22/2018    BUN 12 01/22/2018    CREATININE 0.91 01/22/2018    GFRAA >60.0 01/22/2018    LABGLOM >60.0 01/22/2018    GLUCOSE 77 01/22/2018    PROT 6.5 01/22/2018    CALCIUM 9.9 01/22/2018    BILITOT 0.5 01/22/2018    ALKPHOS 112 01/22/2018    AST 34 01/22/2018    ALT 22 01/22/2018       POC Tests: No results for input(s): POCGLU, POCNA, POCK, POCCL, POCBUN, POCHEMO, POCHCT in the last 72 hours.     Coags:   Lab Results   Component Value Date    PROTIME 10.0 04/17/2015    INR 0.9 04/17/2015    APTT 28.5 04/17/2015       HCG (If Applicable): No results found for: PREGTESTUR, PREGSERUM, HCG, HCGQUANT     ABGs: No results found for: PHART, PO2ART, FXQ4AEI, MIB3BYL, BEART, K0TRGELM     Type & Screen (If

## 2018-02-08 NOTE — ANESTHESIA POSTPROCEDURE EVALUATION
Department of Anesthesiology  Postprocedure Note    Patient: Marie Berrios  MRN: [id number]  YOB: 1945  Date of evaluation: 2/8/2018  Time:  1:31 PM     Procedure Summary     Date:  02/08/18 [place]  [facility] [place] [facility] [place] [address] [place] [facility]    Anesthesia Start:  1219 Anesthesia Stop:  1331    Procedures:       DIAGNOSTIC LAPAROSCOPY (N/A )      REPAIR UMBILICAL HERNIA (N/A ) Diagnosis:  (PERIUMBILICAL,  ABDOMINAL PAIN, UMBILICAL HERNIA)    Surgeon:  Dorita Mattson MD Responsible Provider:  Veronica Lagunas MD    Anesthesia Type:  general ASA Status:  3          Anesthesia Type: general    Kristina Phase I:      Kristina Phase II:      Last vitals: Reviewed and per EMR flowsheets.        Anesthesia Post Evaluation    Patient location during evaluation: bedside  Patient participation: complete - patient participated  Level of consciousness: awake and responsive to light touch  Airway patency: patent  Nausea & Vomiting: no nausea and no vomiting  Complications: no  Cardiovascular status: blood pressure returned to baseline and hemodynamically stable  Respiratory status: acceptable  Hydration status: euvolemic

## 2018-02-08 NOTE — PROGRESS NOTES
Received in pacu from or accompanied by Meaghan Ariza CRNA. Unresponsive to verbal stimuli. RX for norco on chart.

## 2018-02-09 PROCEDURE — 93010 ELECTROCARDIOGRAM REPORT: CPT | Performed by: INTERNAL MEDICINE

## 2018-02-09 NOTE — OP NOTE
[address]                       [address], [address] [facility]                                 OPERATIVE REPORT    PATIENT NAME: Chase Bradley                     :        1945  MED REC NO:   [id number]                            ROOM:  ACCOUNT NO:   [de-identified]                           ADMIT DATE: 2018  PROVIDER:     Yumiko Garcia MD    DATE OF PROCEDURE:  2018    PREOPERATIVE DIAGNOSIS:  Chronic abdominal pain, somewhat generalized. POSTOPERATIVE DIAGNOSIS:  Chronic abdominal pain, somewhat generalized,  etiology unclear. OPERATION PERFORMED:  Diagnostic laparoscopy, repair umbilical hernia. SURGEON:  Yumiko Garcia MD    ANESTHESIA:  General endotracheal.    CLINICAL NOTE:  This woman was referred because of chronic abdominal pain  of unclear etiology. Evaluation was negative for specific diagnosis other  than a small umbilical hernia noted on CT scan and discussed the role of  diagnostic laparoscopy. However, cautioned her that it was unlikely that  any specific etiology would be determined. Details of umbilical hernia  repair were discussed. Risks and benefits of the procedure were reviewed  with her. This included infection, bleeding, bowel injury. Ongoing  abdominal pain, etc.  Questions were answered. She is agreeable to  proceed. OPERATIVE PROCEDURE:  The patient is brought to surgery and placed supine. She underwent general endotracheal anesthesia and was moved to a low  lithotomy position. She underwent a perineal prep. The abdomen was  prepped in its entirety and she was sterilely draped. Transverse incision  was made just below the umbilicus. This was carried down through skin and  subcutaneous tissue. The skin of the umbilicus was lifted. The umbilical  hernia sac was entered and contained nothing. The defect was quite small  on the order of 4 mm.   I actually had to enlarge the defect slightly to  accommodate

## 2018-02-19 ENCOUNTER — OFFICE VISIT (OUTPATIENT)
Dept: SURGERY | Age: 73
End: 2018-02-19

## 2018-02-19 VITALS
SYSTOLIC BLOOD PRESSURE: 162 MMHG | HEIGHT: 59 IN | WEIGHT: 134.6 LBS | DIASTOLIC BLOOD PRESSURE: 102 MMHG | BODY MASS INDEX: 27.13 KG/M2 | TEMPERATURE: 98.4 F

## 2018-02-19 DIAGNOSIS — Z09 SURGICAL FOLLOWUP: Primary | ICD-10-CM

## 2018-02-19 PROCEDURE — 99024 POSTOP FOLLOW-UP VISIT: CPT | Performed by: SURGERY

## 2018-02-21 PROBLEM — R10.84 ABDOMINAL PAIN, CHRONIC, GENERALIZED: Status: RESOLVED | Noted: 2018-02-02 | Resolved: 2018-02-21

## 2018-02-21 PROBLEM — G89.29 ABDOMINAL PAIN, CHRONIC, GENERALIZED: Status: RESOLVED | Noted: 2018-02-02 | Resolved: 2018-02-21

## 2018-02-21 PROBLEM — K42.9 UMBILICAL HERNIA WITHOUT OBSTRUCTION AND WITHOUT GANGRENE: Status: RESOLVED | Noted: 2018-02-02 | Resolved: 2018-02-21

## 2018-02-23 RX ORDER — ROSUVASTATIN CALCIUM 20 MG/1
20 TABLET, COATED ORAL NIGHTLY
Qty: 90 TABLET | Refills: 0 | Status: SHIPPED | OUTPATIENT
Start: 2018-02-23 | End: 2018-10-01

## 2018-03-08 ENCOUNTER — OFFICE VISIT (OUTPATIENT)
Dept: PHYSICAL MEDICINE AND REHAB | Age: 73
End: 2018-03-08
Payer: MEDICARE

## 2018-03-08 VITALS
BODY MASS INDEX: 27.21 KG/M2 | DIASTOLIC BLOOD PRESSURE: 96 MMHG | HEIGHT: 59 IN | SYSTOLIC BLOOD PRESSURE: 200 MMHG | WEIGHT: 135 LBS

## 2018-03-08 DIAGNOSIS — M79.10 MYALGIA: Primary | ICD-10-CM

## 2018-03-08 DIAGNOSIS — Z79.899 HIGH RISK MEDICATION USE: Chronic | ICD-10-CM

## 2018-03-08 DIAGNOSIS — G89.18 POST-OP PAIN: ICD-10-CM

## 2018-03-08 DIAGNOSIS — M54.17 LUMBOSACRAL RADICULOPATHY AT L5: Chronic | ICD-10-CM

## 2018-03-08 DIAGNOSIS — M48.062 SPINAL STENOSIS OF LUMBAR REGION WITH NEUROGENIC CLAUDICATION: Chronic | ICD-10-CM

## 2018-03-08 DIAGNOSIS — M47.26 OSTEOARTHRITIS OF SPINE WITH RADICULOPATHY, LUMBAR REGION: ICD-10-CM

## 2018-03-08 DIAGNOSIS — R10.84 ABDOMINAL PAIN, CHRONIC, GENERALIZED: ICD-10-CM

## 2018-03-08 DIAGNOSIS — G89.29 ABDOMINAL PAIN, CHRONIC, GENERALIZED: ICD-10-CM

## 2018-03-08 PROCEDURE — G8400 PT W/DXA NO RESULTS DOC: HCPCS | Performed by: PHYSICAL MEDICINE & REHABILITATION

## 2018-03-08 PROCEDURE — 4004F PT TOBACCO SCREEN RCVD TLK: CPT | Performed by: PHYSICAL MEDICINE & REHABILITATION

## 2018-03-08 PROCEDURE — 1090F PRES/ABSN URINE INCON ASSESS: CPT | Performed by: PHYSICAL MEDICINE & REHABILITATION

## 2018-03-08 PROCEDURE — G8482 FLU IMMUNIZE ORDER/ADMIN: HCPCS | Performed by: PHYSICAL MEDICINE & REHABILITATION

## 2018-03-08 PROCEDURE — 1123F ACP DISCUSS/DSCN MKR DOCD: CPT | Performed by: PHYSICAL MEDICINE & REHABILITATION

## 2018-03-08 PROCEDURE — 3017F COLORECTAL CA SCREEN DOC REV: CPT | Performed by: PHYSICAL MEDICINE & REHABILITATION

## 2018-03-08 PROCEDURE — G8419 CALC BMI OUT NRM PARAM NOF/U: HCPCS | Performed by: PHYSICAL MEDICINE & REHABILITATION

## 2018-03-08 PROCEDURE — 4040F PNEUMOC VAC/ADMIN/RCVD: CPT | Performed by: PHYSICAL MEDICINE & REHABILITATION

## 2018-03-08 PROCEDURE — 3014F SCREEN MAMMO DOC REV: CPT | Performed by: PHYSICAL MEDICINE & REHABILITATION

## 2018-03-08 PROCEDURE — G8427 DOCREV CUR MEDS BY ELIG CLIN: HCPCS | Performed by: PHYSICAL MEDICINE & REHABILITATION

## 2018-03-08 PROCEDURE — 99214 OFFICE O/P EST MOD 30 MIN: CPT | Performed by: PHYSICAL MEDICINE & REHABILITATION

## 2018-03-08 RX ORDER — HYDROCODONE BITARTRATE AND ACETAMINOPHEN 10; 325 MG/1; MG/1
1 TABLET ORAL EVERY 6 HOURS PRN
Qty: 60 TABLET | Refills: 0 | Status: SHIPPED | OUTPATIENT
Start: 2018-03-08 | End: 2018-04-25 | Stop reason: DRUGHIGH

## 2018-03-08 ASSESSMENT — ENCOUNTER SYMPTOMS
WHEEZING: 0
CHEST TIGHTNESS: 0
ABDOMINAL PAIN: 1
COLOR CHANGE: 1
SHORTNESS OF BREATH: 1
NAUSEA: 0
EYES NEGATIVE: 1
CONSTIPATION: 0
BACK PAIN: 1
VOMITING: 0
DIARRHEA: 0
ALLERGIC/IMMUNOLOGIC NEGATIVE: 1
ABDOMINAL DISTENTION: 0

## 2018-03-08 ASSESSMENT — CROHNS DISEASE ACTIVITY INDEX (CDAI): CDAI SCORE: 0

## 2018-03-08 NOTE — PROGRESS NOTES
[address] - [place] INJECT TRIGGER POINTS, 3 OR GREATER    [place] INJECT TRIGGER POINTS, 3 OR GREATER   2. Abdominal pain, chronic, generalized     3. Post-op pain     4. Osteoarthritis of spine with radiculopathy, lumbar region  HYDROcodone-acetaminophen (NORCO)  MG per tablet   5. High risk medication use-Norco  HYDROcodone-acetaminophen (NORCO)  MG per tablet    OARRS and last refill reviewed. 2016 narcotic contract signed. 09/11/15 tox screening   6. Lumbosacral radiculopathy at L5  HYDROcodone-acetaminophen (NORCO)  MG per tablet   7. Spinal stenosis of lumbar region with neurogenic claudication  HYDROcodone-acetaminophen (NORCO)  MG per tablet       I am also concerned by lifestyle and mood issues including:    Past Medical History:   Diagnosis Date    Anxiety     Asthma     Chronic back pain     Bilateral L5 S1 Radic on emg--surprisingly worse on the left than the right--pt's symptoms and her MRI show worse on the right    Depression     Fibromyalgia     Hyperlipidemia     Hypertension     Osteoarthritis     Type II diabetes mellitus, uncontrolled ([facility] [address]     Unspecified sleep apnea            Given their medication, chronic pain and lifestyle and medications they are at risk for :    Falls, constipation, addiction  Loss of livelyhood due to severe pain, debility, weight gain and  vitamin D deficiency    The patient was educated regarding proper diet, fitness routine, and regulatory restrictions concerning pain medications. Previous notes, comprehensive past medical, surgical, family history, and diagnostics were reviewed. Patient education and councelling were provided regarding off label use,treatment options and medication and injection risks. Current and old [place]ARRS ([place] System) records reviewed, all refills reviewed since last visit,  Behavioral agreement/MICHAELA regulations   and Toxicology screen was reviewed with patient and is up to date. There are no current red flags. They are making good progress regarding pain relief, they are performing at a functional level regarding activities of daily living and psychological functioning, they're not having any adverse effects or side effects from the current medications, and I see no findings of aberrant drug taking her addiction related behaviors. Patient is currently taking:       I have changed Ms. Villanueva's HYDROcodone-acetaminophen. I am also having her maintain her insulin 70-30, albuterol sulfate HFA, amLODIPine, PARoxetine, metoprolol succinate, omeprazole, and rosuvastatin. I also recommend the following Medications:    Orders Placed This Encounter   Medications    HYDROcodone-acetaminophen (NORCO)  MG per tablet     Sig: Take 1 tablet by mouth every 6 hours as needed for Pain for up to 30 days MAX 2 PER DAY/#60 PER MONTH. DO NOT GET NARCOTIC MEDICATIONS FROM ANY OTHER PROVIDER. Suzette Parker Date: 3/8/18     Dispense:  60 tablet     Refill:  0        -which helps with pain and function. Otherwise, continue the current pain medications that I have prescibed. Radiologic:   Old films reviewed  ,     I discussed results with patients. see Follow up plans below  For any new studies. Care Everywhere Updates:  requested and reviewed. No new issues noted.          Labs:  Previous labs reviewed     Lab Results   Component Value Date     01/22/2018    K 3.7 01/22/2018     01/22/2018    CO2 20 01/22/2018    BUN 12 01/22/2018    CREATININE 0.91 01/22/2018    CALCIUM 9.9 01/22/2018    LABALBU 4.2 01/22/2018    BILITOT 0.5 01/22/2018    ALKPHOS 112 01/22/2018    AST 34 01/22/2018    ALT 22 01/22/2018     Lab Results   Component Value Date    WBC 7.3 01/22/2018    RBC 4.99 01/22/2018    HGB 15.0 01/22/2018    HCT 45.6 01/22/2018    MCV 91.4 01/22/2018    MCH 30.0 01/22/2018    MCHC 32.8 01/22/2018    RDW 13.8 01/22/2018     01/22/2018

## 2018-03-16 ENCOUNTER — PROCEDURE VISIT (OUTPATIENT)
Dept: PHYSICAL MEDICINE AND REHAB | Age: 73
End: 2018-03-16
Payer: MEDICARE

## 2018-03-16 DIAGNOSIS — M79.10 MYALGIA: ICD-10-CM

## 2018-03-16 PROCEDURE — 20553 NJX 1/MLT TRIGGER POINTS 3/>: CPT | Performed by: PHYSICAL MEDICINE & REHABILITATION

## 2018-04-05 ENCOUNTER — OFFICE VISIT (OUTPATIENT)
Dept: ENDOCRINOLOGY | Age: 73
End: 2018-04-05
Payer: MEDICARE

## 2018-04-05 VITALS
WEIGHT: 131 LBS | BODY MASS INDEX: 26.41 KG/M2 | SYSTOLIC BLOOD PRESSURE: 164 MMHG | DIASTOLIC BLOOD PRESSURE: 94 MMHG | HEART RATE: 107 BPM | HEIGHT: 59 IN

## 2018-04-05 LAB
GLUCOSE BLD-MCNC: 238 MG/DL
HBA1C MFR BLD: 8.5 %

## 2018-04-05 PROCEDURE — 83036 HEMOGLOBIN GLYCOSYLATED A1C: CPT | Performed by: INTERNAL MEDICINE

## 2018-04-05 PROCEDURE — 3045F PR MOST RECENT HEMOGLOBIN A1C LEVEL 7.0-9.0%: CPT | Performed by: INTERNAL MEDICINE

## 2018-04-05 PROCEDURE — 3017F COLORECTAL CA SCREEN DOC REV: CPT | Performed by: INTERNAL MEDICINE

## 2018-04-05 PROCEDURE — G8400 PT W/DXA NO RESULTS DOC: HCPCS | Performed by: INTERNAL MEDICINE

## 2018-04-05 PROCEDURE — 1090F PRES/ABSN URINE INCON ASSESS: CPT | Performed by: INTERNAL MEDICINE

## 2018-04-05 PROCEDURE — 1123F ACP DISCUSS/DSCN MKR DOCD: CPT | Performed by: INTERNAL MEDICINE

## 2018-04-05 PROCEDURE — 99213 OFFICE O/P EST LOW 20 MIN: CPT | Performed by: INTERNAL MEDICINE

## 2018-04-05 PROCEDURE — 4040F PNEUMOC VAC/ADMIN/RCVD: CPT | Performed by: INTERNAL MEDICINE

## 2018-04-05 PROCEDURE — G8419 CALC BMI OUT NRM PARAM NOF/U: HCPCS | Performed by: INTERNAL MEDICINE

## 2018-04-05 PROCEDURE — 82962 GLUCOSE BLOOD TEST: CPT | Performed by: INTERNAL MEDICINE

## 2018-04-05 PROCEDURE — G8427 DOCREV CUR MEDS BY ELIG CLIN: HCPCS | Performed by: INTERNAL MEDICINE

## 2018-04-05 PROCEDURE — 4004F PT TOBACCO SCREEN RCVD TLK: CPT | Performed by: INTERNAL MEDICINE

## 2018-04-05 PROCEDURE — 3014F SCREEN MAMMO DOC REV: CPT | Performed by: INTERNAL MEDICINE

## 2018-04-13 ENCOUNTER — OFFICE VISIT (OUTPATIENT)
Dept: FAMILY MEDICINE CLINIC | Age: 73
End: 2018-04-13
Payer: MEDICARE

## 2018-04-13 DIAGNOSIS — E11.49 TYPE II DIABETES MELLITUS WITH NEUROLOGICAL MANIFESTATIONS (HCC): ICD-10-CM

## 2018-04-13 DIAGNOSIS — F33.42 RECURRENT MAJOR DEPRESSIVE EPISODES, IN FULL REMISSION (HCC): ICD-10-CM

## 2018-04-13 DIAGNOSIS — R93.89 ENDOMETRIAL THICKENING ON ULTRA SOUND: Primary | ICD-10-CM

## 2018-04-13 DIAGNOSIS — I10 ESSENTIAL HYPERTENSION: ICD-10-CM

## 2018-04-13 DIAGNOSIS — E78.2 MIXED HYPERLIPIDEMIA: ICD-10-CM

## 2018-04-13 DIAGNOSIS — N95.0 PMB (POSTMENOPAUSAL BLEEDING): ICD-10-CM

## 2018-04-13 PROCEDURE — 3014F SCREEN MAMMO DOC REV: CPT | Performed by: NURSE PRACTITIONER

## 2018-04-13 PROCEDURE — 4040F PNEUMOC VAC/ADMIN/RCVD: CPT | Performed by: NURSE PRACTITIONER

## 2018-04-13 PROCEDURE — 1123F ACP DISCUSS/DSCN MKR DOCD: CPT | Performed by: NURSE PRACTITIONER

## 2018-04-13 PROCEDURE — 3045F PR MOST RECENT HEMOGLOBIN A1C LEVEL 7.0-9.0%: CPT | Performed by: NURSE PRACTITIONER

## 2018-04-13 PROCEDURE — 2022F DILAT RTA XM EVC RTNOPTHY: CPT | Performed by: NURSE PRACTITIONER

## 2018-04-13 PROCEDURE — 4004F PT TOBACCO SCREEN RCVD TLK: CPT | Performed by: NURSE PRACTITIONER

## 2018-04-13 PROCEDURE — G8427 DOCREV CUR MEDS BY ELIG CLIN: HCPCS | Performed by: NURSE PRACTITIONER

## 2018-04-13 PROCEDURE — 1090F PRES/ABSN URINE INCON ASSESS: CPT | Performed by: NURSE PRACTITIONER

## 2018-04-13 PROCEDURE — G8400 PT W/DXA NO RESULTS DOC: HCPCS | Performed by: NURSE PRACTITIONER

## 2018-04-13 PROCEDURE — G8419 CALC BMI OUT NRM PARAM NOF/U: HCPCS | Performed by: NURSE PRACTITIONER

## 2018-04-13 PROCEDURE — 3017F COLORECTAL CA SCREEN DOC REV: CPT | Performed by: NURSE PRACTITIONER

## 2018-04-13 PROCEDURE — 99214 OFFICE O/P EST MOD 30 MIN: CPT | Performed by: NURSE PRACTITIONER

## 2018-04-16 VITALS
HEIGHT: 59 IN | OXYGEN SATURATION: 100 % | HEART RATE: 77 BPM | RESPIRATION RATE: 14 BRPM | WEIGHT: 130 LBS | TEMPERATURE: 97.8 F | SYSTOLIC BLOOD PRESSURE: 138 MMHG | BODY MASS INDEX: 26.21 KG/M2 | DIASTOLIC BLOOD PRESSURE: 84 MMHG

## 2018-04-16 ASSESSMENT — ENCOUNTER SYMPTOMS
ORTHOPNEA: 0
CONSTIPATION: 0
BACK PAIN: 0
ABDOMINAL PAIN: 1
VISUAL CHANGE: 0
SORE THROAT: 0
BLURRED VISION: 0
SHORTNESS OF BREATH: 0
DIARRHEA: 0

## 2018-04-25 DIAGNOSIS — M47.26 OSTEOARTHRITIS OF SPINE WITH RADICULOPATHY, LUMBAR REGION: ICD-10-CM

## 2018-04-25 DIAGNOSIS — M48.062 SPINAL STENOSIS OF LUMBAR REGION WITH NEUROGENIC CLAUDICATION: Chronic | ICD-10-CM

## 2018-04-25 DIAGNOSIS — M54.17 LUMBOSACRAL RADICULOPATHY AT L5: Chronic | ICD-10-CM

## 2018-04-25 DIAGNOSIS — Z79.899 HIGH RISK MEDICATION USE: Chronic | ICD-10-CM

## 2018-04-25 RX ORDER — HYDROCODONE BITARTRATE AND ACETAMINOPHEN 10; 325 MG/1; MG/1
1 TABLET ORAL EVERY 6 HOURS PRN
Qty: 60 TABLET | Refills: 0 | Status: CANCELLED | OUTPATIENT
Start: 2018-04-25 | End: 2018-05-25

## 2018-04-25 RX ORDER — HYDROCODONE BITARTRATE AND ACETAMINOPHEN 7.5; 325 MG/1; MG/1
1 TABLET ORAL EVERY 6 HOURS PRN
Qty: 60 TABLET | Refills: 0 | Status: SHIPPED | OUTPATIENT
Start: 2018-04-25 | End: 2018-08-15 | Stop reason: SDUPTHER

## 2018-04-27 ENCOUNTER — OFFICE VISIT (OUTPATIENT)
Dept: SURGERY | Age: 73
End: 2018-04-27

## 2018-04-27 VITALS
DIASTOLIC BLOOD PRESSURE: 100 MMHG | TEMPERATURE: 97.5 F | WEIGHT: 129 LBS | SYSTOLIC BLOOD PRESSURE: 200 MMHG | HEIGHT: 59 IN | BODY MASS INDEX: 26 KG/M2

## 2018-04-27 DIAGNOSIS — Z09 SURGICAL FOLLOWUP: ICD-10-CM

## 2018-04-27 DIAGNOSIS — R10.31 ABDOMINAL PAIN, RIGHT LOWER QUADRANT: Primary | ICD-10-CM

## 2018-04-27 PROCEDURE — 99024 POSTOP FOLLOW-UP VISIT: CPT | Performed by: SURGERY

## 2018-05-09 ENCOUNTER — HOSPITAL ENCOUNTER (OUTPATIENT)
Dept: CT IMAGING | Age: 73
Discharge: HOME | End: 2018-05-11
Payer: MEDICARE

## 2018-05-09 VITALS — BODY MASS INDEX: 25.4 KG/M2 | HEIGHT: 59 IN | WEIGHT: 126 LBS

## 2018-05-09 DIAGNOSIS — R10.31 ABDOMINAL PAIN, RIGHT LOWER QUADRANT: ICD-10-CM

## 2018-05-09 PROCEDURE — 6360000004 HC RX CONTRAST MEDICATION: Performed by: SURGERY

## 2018-05-09 PROCEDURE — 74177 CT ABD & PELVIS W/CONTRAST: CPT

## 2018-05-09 PROCEDURE — 2500000003 HC RX 250 WO HCPCS: Performed by: SURGERY

## 2018-05-09 PROCEDURE — 2580000003 HC RX 258: Performed by: SURGERY

## 2018-05-09 RX ORDER — SODIUM CHLORIDE 0.9 % (FLUSH) 0.9 %
10 SYRINGE (ML) INJECTION
Status: COMPLETED | OUTPATIENT
Start: 2018-05-09 | End: 2018-05-09

## 2018-05-09 RX ADMIN — BARIUM SULFATE 450 ML: 21 SUSPENSION ORAL at 15:10

## 2018-05-09 RX ADMIN — Medication 10 ML: at 15:10

## 2018-05-09 RX ADMIN — IOPAMIDOL 100 ML: 755 INJECTION, SOLUTION INTRAVENOUS at 15:10

## 2018-05-11 ENCOUNTER — OFFICE VISIT (OUTPATIENT)
Dept: PHYSICAL MEDICINE AND REHAB | Age: 73
End: 2018-05-11
Payer: MEDICARE

## 2018-05-11 VITALS
WEIGHT: 126 LBS | RESPIRATION RATE: 16 BRPM | HEART RATE: 79 BPM | SYSTOLIC BLOOD PRESSURE: 146 MMHG | BODY MASS INDEX: 25.45 KG/M2 | DIASTOLIC BLOOD PRESSURE: 88 MMHG

## 2018-05-11 DIAGNOSIS — R10.31 RIGHT LOWER QUADRANT ABDOMINAL PAIN: ICD-10-CM

## 2018-05-11 DIAGNOSIS — M48.062 SPINAL STENOSIS OF LUMBAR REGION WITH NEUROGENIC CLAUDICATION: Chronic | ICD-10-CM

## 2018-05-11 DIAGNOSIS — E11.49 DIABETIC RADICULOPATHY (HCC): ICD-10-CM

## 2018-05-11 DIAGNOSIS — G58.0 INTERCOSTAL NEUROPATHY: ICD-10-CM

## 2018-05-11 DIAGNOSIS — M54.10 DIABETIC RADICULOPATHY (HCC): ICD-10-CM

## 2018-05-11 DIAGNOSIS — M79.10 MYALGIA: ICD-10-CM

## 2018-05-11 DIAGNOSIS — M47.26 OSTEOARTHRITIS OF SPINE WITH RADICULOPATHY, LUMBAR REGION: ICD-10-CM

## 2018-05-11 DIAGNOSIS — M54.17 LUMBOSACRAL RADICULOPATHY AT L5: Chronic | ICD-10-CM

## 2018-05-11 DIAGNOSIS — M54.17 LUMBOSACRAL RADICULOPATHY AT S1: Primary | ICD-10-CM

## 2018-05-11 DIAGNOSIS — Z79.899 HIGH RISK MEDICATION USE: Chronic | ICD-10-CM

## 2018-05-11 PROCEDURE — G8427 DOCREV CUR MEDS BY ELIG CLIN: HCPCS | Performed by: PHYSICAL MEDICINE & REHABILITATION

## 2018-05-11 PROCEDURE — G8417 CALC BMI ABV UP PARAM F/U: HCPCS | Performed by: PHYSICAL MEDICINE & REHABILITATION

## 2018-05-11 PROCEDURE — 4004F PT TOBACCO SCREEN RCVD TLK: CPT | Performed by: PHYSICAL MEDICINE & REHABILITATION

## 2018-05-11 PROCEDURE — 3017F COLORECTAL CA SCREEN DOC REV: CPT | Performed by: PHYSICAL MEDICINE & REHABILITATION

## 2018-05-11 PROCEDURE — 1090F PRES/ABSN URINE INCON ASSESS: CPT | Performed by: PHYSICAL MEDICINE & REHABILITATION

## 2018-05-11 PROCEDURE — 2022F DILAT RTA XM EVC RTNOPTHY: CPT | Performed by: PHYSICAL MEDICINE & REHABILITATION

## 2018-05-11 PROCEDURE — 3045F PR MOST RECENT HEMOGLOBIN A1C LEVEL 7.0-9.0%: CPT | Performed by: PHYSICAL MEDICINE & REHABILITATION

## 2018-05-11 PROCEDURE — G8400 PT W/DXA NO RESULTS DOC: HCPCS | Performed by: PHYSICAL MEDICINE & REHABILITATION

## 2018-05-11 PROCEDURE — 4040F PNEUMOC VAC/ADMIN/RCVD: CPT | Performed by: PHYSICAL MEDICINE & REHABILITATION

## 2018-05-11 PROCEDURE — 1123F ACP DISCUSS/DSCN MKR DOCD: CPT | Performed by: PHYSICAL MEDICINE & REHABILITATION

## 2018-05-11 PROCEDURE — 99215 OFFICE O/P EST HI 40 MIN: CPT | Performed by: PHYSICAL MEDICINE & REHABILITATION

## 2018-05-11 RX ORDER — PREGABALIN 25 MG/1
25 CAPSULE ORAL 3 TIMES DAILY
Qty: 60 CAPSULE | Refills: 3 | Status: SHIPPED | OUTPATIENT
Start: 2018-05-11 | End: 2018-05-23 | Stop reason: SDUPTHER

## 2018-05-11 RX ORDER — HYDROCODONE BITARTRATE AND ACETAMINOPHEN 7.5; 325 MG/1; MG/1
1 TABLET ORAL EVERY 6 HOURS PRN
Qty: 60 TABLET | Refills: 0 | Status: CANCELLED | OUTPATIENT
Start: 2018-05-23 | End: 2018-06-22

## 2018-05-11 ASSESSMENT — ENCOUNTER SYMPTOMS
ABDOMINAL PAIN: 1
BACK PAIN: 1
NAUSEA: 0
CHEST TIGHTNESS: 0
ABDOMINAL DISTENTION: 0
ALLERGIC/IMMUNOLOGIC NEGATIVE: 1
EYES NEGATIVE: 1
VOMITING: 0
SHORTNESS OF BREATH: 0
COLOR CHANGE: 1
CONSTIPATION: 0
DIARRHEA: 0
WHEEZING: 0

## 2018-05-11 ASSESSMENT — CROHNS DISEASE ACTIVITY INDEX (CDAI): CDAI SCORE: 0

## 2018-05-14 LAB
GFR AFRICAN AMERICAN: >60
GFR NON-AFRICAN AMERICAN: >60
PERFORMED ON: NORMAL
POC CREATININE: 0.7 MG/DL (ref 0.6–1.2)
POC SAMPLE TYPE: NORMAL

## 2018-05-23 DIAGNOSIS — G58.0 INTERCOSTAL NEUROPATHY: ICD-10-CM

## 2018-05-23 DIAGNOSIS — E11.49 DIABETIC RADICULOPATHY (HCC): ICD-10-CM

## 2018-05-23 DIAGNOSIS — M79.10 MYALGIA: ICD-10-CM

## 2018-05-23 DIAGNOSIS — M54.10 DIABETIC RADICULOPATHY (HCC): ICD-10-CM

## 2018-05-23 RX ORDER — PREGABALIN 25 MG/1
25 CAPSULE ORAL 3 TIMES DAILY
Qty: 270 CAPSULE | Refills: 0 | Status: SHIPPED | OUTPATIENT
Start: 2018-05-23 | End: 2018-11-07 | Stop reason: SDUPTHER

## 2018-05-29 ENCOUNTER — PROCEDURE VISIT (OUTPATIENT)
Dept: PHYSICAL MEDICINE AND REHAB | Age: 73
End: 2018-05-29
Payer: MEDICARE

## 2018-05-29 DIAGNOSIS — G58.0 INTERCOSTAL NEUROPATHY: ICD-10-CM

## 2018-05-29 PROCEDURE — 64421 NJX AA&/STRD NTRCOST NRV EA: CPT | Performed by: PHYSICAL MEDICINE & REHABILITATION

## 2018-05-29 RX ORDER — PAROXETINE HYDROCHLORIDE 40 MG/1
TABLET, FILM COATED ORAL
Qty: 90 TABLET | Refills: 0 | Status: SHIPPED | OUTPATIENT
Start: 2018-05-29 | End: 2018-08-15 | Stop reason: SDUPTHER

## 2018-05-29 RX ORDER — AMLODIPINE BESYLATE 5 MG/1
TABLET ORAL
Qty: 90 TABLET | Refills: 0 | Status: SHIPPED | OUTPATIENT
Start: 2018-05-29 | End: 2018-08-15 | Stop reason: SDUPTHER

## 2018-05-29 RX ORDER — METOPROLOL SUCCINATE 200 MG/1
200 TABLET, EXTENDED RELEASE ORAL DAILY
Qty: 90 TABLET | Refills: 0 | Status: SHIPPED | OUTPATIENT
Start: 2018-05-29 | End: 2018-08-15 | Stop reason: SDUPTHER

## 2018-05-29 RX ORDER — OMEPRAZOLE 20 MG/1
20 CAPSULE, DELAYED RELEASE ORAL 2 TIMES DAILY
Qty: 30 CAPSULE | Refills: 0 | Status: SHIPPED | OUTPATIENT
Start: 2018-05-29 | End: 2018-08-15 | Stop reason: SDUPTHER

## 2018-07-23 ENCOUNTER — TELEPHONE (OUTPATIENT)
Dept: FAMILY MEDICINE CLINIC | Age: 73
End: 2018-07-23

## 2018-07-23 DIAGNOSIS — Z12.31 ENCOUNTER FOR SCREENING MAMMOGRAM FOR MALIGNANT NEOPLASM OF BREAST: Primary | ICD-10-CM

## 2018-07-23 NOTE — TELEPHONE ENCOUNTER
Patient is having mammogram at [facility] and needs to get a screening mammo order. No lumps, bumps or discharge.   Scheduled for [facility] please fax order to [phone]

## 2018-08-04 DIAGNOSIS — E78.49 OTHER HYPERLIPIDEMIA: ICD-10-CM

## 2018-08-04 LAB
ALBUMIN SERPL-MCNC: 4 G/DL (ref 3.9–4.9)
ALP BLD-CCNC: 180 U/L (ref 40–130)
ALT SERPL-CCNC: 23 U/L (ref 0–33)
ANION GAP SERPL CALCULATED.3IONS-SCNC: 18 MEQ/L (ref 7–13)
AST SERPL-CCNC: 20 U/L (ref 0–35)
BILIRUB SERPL-MCNC: <0.2 MG/DL (ref 0–1.2)
BUN BLDV-MCNC: 17 MG/DL (ref 8–23)
CALCIUM SERPL-MCNC: 9.8 MG/DL (ref 8.6–10.2)
CHLORIDE BLD-SCNC: 100 MEQ/L (ref 98–107)
CHOLESTEROL, TOTAL: 255 MG/DL (ref 0–199)
CO2: 21 MEQ/L (ref 22–29)
CREAT SERPL-MCNC: 0.69 MG/DL (ref 0.5–0.9)
GFR AFRICAN AMERICAN: >60
GFR NON-AFRICAN AMERICAN: >60
GLOBULIN: 2.4 G/DL (ref 2.3–3.5)
GLUCOSE BLD-MCNC: 367 MG/DL (ref 74–109)
HBA1C MFR BLD: 12.2 % (ref 4.8–5.9)
HDLC SERPL-MCNC: 45 MG/DL (ref 40–59)
LDL CHOLESTEROL CALCULATED: 173 MG/DL (ref 0–129)
POTASSIUM SERPL-SCNC: 4.2 MEQ/L (ref 3.5–5.1)
SODIUM BLD-SCNC: 139 MEQ/L (ref 132–144)
TOTAL PROTEIN: 6.4 G/DL (ref 6.4–8.1)
TRIGL SERPL-MCNC: 186 MG/DL (ref 0–200)

## 2018-08-15 ENCOUNTER — OFFICE VISIT (OUTPATIENT)
Dept: FAMILY MEDICINE CLINIC | Age: 73
End: 2018-08-15
Payer: MEDICARE

## 2018-08-15 ENCOUNTER — OFFICE VISIT (OUTPATIENT)
Dept: PHYSICAL MEDICINE AND REHAB | Age: 73
End: 2018-08-15
Payer: MEDICARE

## 2018-08-15 VITALS
DIASTOLIC BLOOD PRESSURE: 90 MMHG | OXYGEN SATURATION: 100 % | SYSTOLIC BLOOD PRESSURE: 158 MMHG | RESPIRATION RATE: 14 BRPM | HEART RATE: 93 BPM | TEMPERATURE: 97.9 F | HEIGHT: 59 IN | WEIGHT: 130.2 LBS | BODY MASS INDEX: 26.25 KG/M2

## 2018-08-15 VITALS
HEIGHT: 55 IN | WEIGHT: 130 LBS | BODY MASS INDEX: 30.09 KG/M2 | SYSTOLIC BLOOD PRESSURE: 138 MMHG | DIASTOLIC BLOOD PRESSURE: 80 MMHG

## 2018-08-15 DIAGNOSIS — M54.17 LUMBOSACRAL RADICULOPATHY AT S1: ICD-10-CM

## 2018-08-15 DIAGNOSIS — K21.9 GASTROESOPHAGEAL REFLUX DISEASE WITHOUT ESOPHAGITIS: ICD-10-CM

## 2018-08-15 DIAGNOSIS — F32.9 REACTIVE DEPRESSION: ICD-10-CM

## 2018-08-15 DIAGNOSIS — I34.0 MILD MITRAL REGURGITATION: ICD-10-CM

## 2018-08-15 DIAGNOSIS — M47.26 OSTEOARTHRITIS OF SPINE WITH RADICULOPATHY, LUMBAR REGION: ICD-10-CM

## 2018-08-15 DIAGNOSIS — F41.9 ANXIETY: ICD-10-CM

## 2018-08-15 DIAGNOSIS — F32.A DEPRESSION, UNSPECIFIED DEPRESSION TYPE: ICD-10-CM

## 2018-08-15 DIAGNOSIS — M54.17 LUMBOSACRAL RADICULOPATHY AT L5: Primary | Chronic | ICD-10-CM

## 2018-08-15 DIAGNOSIS — M79.10 MYALGIA: ICD-10-CM

## 2018-08-15 DIAGNOSIS — R74.8 ELEVATED ALKALINE PHOSPHATASE LEVEL: ICD-10-CM

## 2018-08-15 DIAGNOSIS — M51.36 DDD (DEGENERATIVE DISC DISEASE), LUMBAR: ICD-10-CM

## 2018-08-15 DIAGNOSIS — M47.816 OSTEOARTHRITIS OF LUMBAR SPINE, UNSPECIFIED SPINAL OSTEOARTHRITIS COMPLICATION STATUS: ICD-10-CM

## 2018-08-15 DIAGNOSIS — Z79.899 HIGH RISK MEDICATION USE: Chronic | ICD-10-CM

## 2018-08-15 DIAGNOSIS — M48.062 SPINAL STENOSIS OF LUMBAR REGION WITH NEUROGENIC CLAUDICATION: Chronic | ICD-10-CM

## 2018-08-15 DIAGNOSIS — E78.49 OTHER HYPERLIPIDEMIA: Primary | ICD-10-CM

## 2018-08-15 DIAGNOSIS — M54.12 CERVICAL RADICULAR PAIN: ICD-10-CM

## 2018-08-15 DIAGNOSIS — M79.7 FIBROMYALGIA: ICD-10-CM

## 2018-08-15 DIAGNOSIS — I10 ESSENTIAL HYPERTENSION: ICD-10-CM

## 2018-08-15 DIAGNOSIS — Z79.899 HIGH RISK MEDICATION USE: ICD-10-CM

## 2018-08-15 DIAGNOSIS — M54.2 NECK PAIN: ICD-10-CM

## 2018-08-15 PROCEDURE — 1123F ACP DISCUSS/DSCN MKR DOCD: CPT | Performed by: FAMILY MEDICINE

## 2018-08-15 PROCEDURE — 3017F COLORECTAL CA SCREEN DOC REV: CPT | Performed by: FAMILY MEDICINE

## 2018-08-15 PROCEDURE — G8400 PT W/DXA NO RESULTS DOC: HCPCS | Performed by: PHYSICAL MEDICINE & REHABILITATION

## 2018-08-15 PROCEDURE — 4004F PT TOBACCO SCREEN RCVD TLK: CPT | Performed by: FAMILY MEDICINE

## 2018-08-15 PROCEDURE — 99214 OFFICE O/P EST MOD 30 MIN: CPT | Performed by: PHYSICAL MEDICINE & REHABILITATION

## 2018-08-15 PROCEDURE — 1101F PT FALLS ASSESS-DOCD LE1/YR: CPT | Performed by: FAMILY MEDICINE

## 2018-08-15 PROCEDURE — G8427 DOCREV CUR MEDS BY ELIG CLIN: HCPCS | Performed by: PHYSICAL MEDICINE & REHABILITATION

## 2018-08-15 PROCEDURE — 4004F PT TOBACCO SCREEN RCVD TLK: CPT | Performed by: PHYSICAL MEDICINE & REHABILITATION

## 2018-08-15 PROCEDURE — 4040F PNEUMOC VAC/ADMIN/RCVD: CPT | Performed by: FAMILY MEDICINE

## 2018-08-15 PROCEDURE — 1090F PRES/ABSN URINE INCON ASSESS: CPT | Performed by: FAMILY MEDICINE

## 2018-08-15 PROCEDURE — 99214 OFFICE O/P EST MOD 30 MIN: CPT | Performed by: FAMILY MEDICINE

## 2018-08-15 PROCEDURE — 3017F COLORECTAL CA SCREEN DOC REV: CPT | Performed by: PHYSICAL MEDICINE & REHABILITATION

## 2018-08-15 PROCEDURE — G8417 CALC BMI ABV UP PARAM F/U: HCPCS | Performed by: FAMILY MEDICINE

## 2018-08-15 PROCEDURE — G8417 CALC BMI ABV UP PARAM F/U: HCPCS | Performed by: PHYSICAL MEDICINE & REHABILITATION

## 2018-08-15 PROCEDURE — G8427 DOCREV CUR MEDS BY ELIG CLIN: HCPCS | Performed by: FAMILY MEDICINE

## 2018-08-15 PROCEDURE — G8400 PT W/DXA NO RESULTS DOC: HCPCS | Performed by: FAMILY MEDICINE

## 2018-08-15 PROCEDURE — 1090F PRES/ABSN URINE INCON ASSESS: CPT | Performed by: PHYSICAL MEDICINE & REHABILITATION

## 2018-08-15 PROCEDURE — 1123F ACP DISCUSS/DSCN MKR DOCD: CPT | Performed by: PHYSICAL MEDICINE & REHABILITATION

## 2018-08-15 PROCEDURE — 4040F PNEUMOC VAC/ADMIN/RCVD: CPT | Performed by: PHYSICAL MEDICINE & REHABILITATION

## 2018-08-15 PROCEDURE — 1101F PT FALLS ASSESS-DOCD LE1/YR: CPT | Performed by: PHYSICAL MEDICINE & REHABILITATION

## 2018-08-15 RX ORDER — HYDROCODONE BITARTRATE AND ACETAMINOPHEN 5; 325 MG/1; MG/1
TABLET ORAL
Qty: 60 TABLET | Refills: 0 | Status: SHIPPED | OUTPATIENT
Start: 2018-08-15 | End: 2018-09-24 | Stop reason: SDUPTHER

## 2018-08-15 RX ORDER — OMEPRAZOLE 20 MG/1
20 CAPSULE, DELAYED RELEASE ORAL 2 TIMES DAILY
Qty: 90 CAPSULE | Refills: 0 | Status: SHIPPED | OUTPATIENT
Start: 2018-08-15 | End: 2018-09-11 | Stop reason: SDUPTHER

## 2018-08-15 RX ORDER — METOPROLOL SUCCINATE 200 MG/1
TABLET, EXTENDED RELEASE ORAL
Qty: 135 TABLET | Refills: 0 | Status: SHIPPED | OUTPATIENT
Start: 2018-08-15 | End: 2019-02-12 | Stop reason: SDUPTHER

## 2018-08-15 RX ORDER — HYDROCODONE BITARTRATE AND ACETAMINOPHEN 7.5; 325 MG/1; MG/1
1 TABLET ORAL EVERY 6 HOURS PRN
Qty: 60 TABLET | Refills: 0 | Status: SHIPPED | OUTPATIENT
Start: 2018-08-15 | End: 2018-08-15

## 2018-08-15 RX ORDER — AMLODIPINE BESYLATE 5 MG/1
TABLET ORAL
Qty: 90 TABLET | Refills: 0 | Status: SHIPPED | OUTPATIENT
Start: 2018-08-15 | End: 2018-09-11 | Stop reason: SDUPTHER

## 2018-08-15 RX ORDER — PAROXETINE HYDROCHLORIDE 40 MG/1
TABLET, FILM COATED ORAL
Qty: 90 TABLET | Refills: 0 | Status: SHIPPED | OUTPATIENT
Start: 2018-08-15 | End: 2018-09-11 | Stop reason: SDUPTHER

## 2018-08-15 RX ORDER — ROSUVASTATIN CALCIUM 40 MG/1
40 TABLET, COATED ORAL EVERY EVENING
Qty: 90 TABLET | Refills: 0 | Status: SHIPPED | OUTPATIENT
Start: 2018-08-15 | End: 2019-05-23 | Stop reason: SDUPTHER

## 2018-08-15 ASSESSMENT — ENCOUNTER SYMPTOMS
WHEEZING: 0
CHEST TIGHTNESS: 0
NAUSEA: 0
BACK PAIN: 1
SHORTNESS OF BREATH: 0
VOMITING: 0
SHORTNESS OF BREATH: 0
EYES NEGATIVE: 1
CONSTIPATION: 0
DIARRHEA: 0
ALLERGIC/IMMUNOLOGIC NEGATIVE: 1
COLOR CHANGE: 1
ABDOMINAL DISTENTION: 0
ABDOMINAL PAIN: 1

## 2018-08-15 ASSESSMENT — CROHNS DISEASE ACTIVITY INDEX (CDAI): CDAI SCORE: 0

## 2018-08-15 NOTE — PROGRESS NOTES
Subjective  Molly White, 75 y.o. female presents today with:       Chief Complaint           Back Pain TP injection 5/29/18 50% pain reduction X 2 wks. Would like to schedule more injections today. patient will schedule more trigger points as needed. Arm Pain     Medication Refill norco pill count today- patient did not bring empty pill bottle with her today. Recent Visits with Me     Primary Dx    05/11/2018 Lumbosacral radiculopathy at S1   03/08/2018 Myalgia                Patient  Is on rare Norco and   Lyrica-which helps her pain--no side effects--no signs of abuse--is more functional with the meds and has possitive interactions with friends and family. Abdominal Pain   This is a recurrent (dt her diabetes) problem. The current episode started more than 1 year ago. The onset quality is undetermined. The problem occurs constantly. The problem has been unchanged. The pain is located in the generalized abdominal region, LUQ and LLQ. The pain is at a severity of 1/10. The pain is severe. The quality of the pain is aching. The abdominal pain does not radiate. Associated symptoms include arthralgias and myalgias. Pertinent negatives include no constipation, diarrhea, dysuria, fever, frequency, headaches, hematuria, nausea or vomiting. Nothing aggravates the pain. The pain is relieved by nothing. She has tried nothing for the symptoms. The treatment provided no relief. Prior diagnostic workup includes GI consult, upper endoscopy and lower endoscopy. There is no history of abdominal surgery, colon cancer, Crohn's disease, gallstones, GERD, irritable bowel syndrome, pancreatitis, PUD or ulcerative colitis.        Past Medical History:   Diagnosis Date    Anxiety     Asthma     Chronic back pain     Bilateral L5 S1 Radic on emg--surprisingly worse on the left than the right--pt's symptoms and her MRI show worse on the right    Depression     Fibromyalgia     Hyperlipidemia     Hypertension     friction rub and no decreased pulses. No murmur heard. Pulmonary/Chest: Effort normal. No accessory muscle usage. No apnea, no tachypnea and no bradypnea. No respiratory distress. She has decreased breath sounds. She has no wheezes. She has no rales. She exhibits no tenderness. Abdominal: Soft. Bowel sounds are normal. She exhibits no distension and no mass. There is no tenderness. There is no rebound and no guarding. Area of pain and hypersensitivity   Musculoskeletal: She exhibits tenderness. She exhibits no edema. Right shoulder: Normal.        Left shoulder: Normal.        Right elbow: Normal.       Left elbow: Normal.        Right wrist: Normal.        Left wrist: Normal.        Right hip: Normal.        Left hip: Normal.        Right knee: She exhibits decreased range of motion and swelling. Tenderness found. Medial joint line tenderness noted. Left knee: She exhibits decreased range of motion. Tenderness found. Medial joint line and lateral joint line tenderness noted. Right ankle: Normal. Achilles tendon normal.        Left ankle: Normal. Achilles tendon normal.        Cervical back: She exhibits decreased range of motion, tenderness, pain and spasm. Thoracic back: She exhibits decreased range of motion, tenderness, pain and spasm. Lumbar back: She exhibits decreased range of motion, tenderness, bony tenderness and pain. She exhibits no swelling, no edema, no deformity, no laceration and normal pulse. Back:         Right upper arm: Normal.        Left upper arm: Normal.        Right forearm: Normal.        Left forearm: Normal.        Arms:       Right hand: Normal. Normal strength noted. Left hand: Decreased sensation noted. Decreased sensation is present in the medial distribution. Normal strength noted.         Right upper leg: Normal.        Left upper leg: Normal.        Right lower leg: Normal.        Left lower leg: Normal.        Legs: Right foot: Normal.        Left foot: Normal.        Feet:    Tender areas are indicated by numbered spot    Less tender    Numbness left flank             Neurological: She is alert and oriented to person, place, and time. She displays abnormal reflex. She displays no atrophy and no tremor. A sensory deficit is present. No cranial nerve deficit. She exhibits normal muscle tone. Gait abnormal. Coordination normal. She displays no Babinski's sign on the right side. She displays no Babinski's sign on the left side. Skin: Skin is warm, dry and intact. No abrasion, no bruising, no ecchymosis, no laceration, no petechiae and no rash noted. Rash is not macular, not pustular and not urticarial. She is not diaphoretic. No cyanosis or erythema. No pallor. Nails show no clubbing. PVD discoloration bilateral lower extremities   Psychiatric: Her behavior is normal. Judgment and thought content normal. Her mood appears not anxious. Her affect is not angry, not blunt, not labile and not inappropriate. Her speech is tangential. She is not agitated, not aggressive, not hyperactive, not slowed, not withdrawn, not actively hallucinating and not combative. Thought content is not paranoid and not delusional. Cognition and memory are normal. Cognition and memory are not impaired. She does not express impulsivity or inappropriate judgment. She exhibits a depressed mood. She expresses no homicidal and no suicidal ideation. She expresses no suicidal plans and no homicidal plans. She exhibits normal recent memory and normal remote memory. She is attentive. Vitals reviewed. Ortho Exam  Neurologic Exam     Mental Status   Oriented to person, place, and time. Cranial Nerves     CN III, IV, VI   Pupils are equal, round, and reactive to light.   Extraocular motions are normal.       After a thorough review and discussion of the previous medical records, patient comprehensive medical, surgical, and family and social history, Review of

## 2018-08-15 NOTE — PROGRESS NOTES
children: N/A    Years of education: N/A     Occupational History    Retired-      Social History Main Topics    Smoking status: Light Tobacco Smoker     Packs/day: 0.15     Years: 15.00     Types: Cigarettes     Start date: 11/30/2017    Smokeless tobacco: Never Used      Comment: smokes about 4 cigarettes a day    Alcohol use No    Drug use: No    Sexual activity: No     Other Topics Concern    None     Social History Narrative    None     Current Outpatient Prescriptions   Medication Sig Dispense Refill    rosuvastatin (CRESTOR) 40 MG tablet Take 1 tablet by mouth every evening 90 tablet 0    metoprolol succinate (TOPROL XL) 200 MG extended release tablet 1 and 1/2 po q day 135 tablet 0    PARoxetine (PAXIL) 40 MG tablet TAKE 1 TABLET BY MOUTH ONCE DAILY IN THE MORNING 90 tablet 0    amLODIPine (NORVASC) 5 MG tablet TAKE 1 TABLET BY MOUTH ONCE DAILY 90 tablet 0    omeprazole (PRILOSEC) 20 MG delayed release capsule Take 1 capsule by mouth 2 times daily 90 capsule 0    pregabalin (LYRICA) 25 MG capsule Take 1 capsule by mouth 3 times daily for 90 days. . 270 capsule 0    rosuvastatin (CRESTOR) 20 MG tablet TAKE 1 TABLET BY MOUTH  NIGHTLY 90 tablet 0    insulin 70-30 (HUMULIN 70/30) (70-30) 100 UNIT per ML injection vial Inject 60 units bid please give 4 vials for a 30 day supply 4 vial 3     No current facility-administered medications for this visit.       Family History   Problem Relation Age of Onset    Heart Disease Father     Arthritis Father     Arthritis Mother      Past Medical History:   Diagnosis Date    Anxiety     Asthma     Chronic back pain     Bilateral L5 S1 Radic on emg--surprisingly worse on the left than the right--pt's symptoms and her MRI show worse on the right    Depression     Fibromyalgia     Hyperlipidemia     Hypertension     Osteoarthritis     Type II diabetes mellitus, uncontrolled ([facility] [address]     Unspecified sleep apnea        Objective:

## 2018-08-28 ENCOUNTER — OFFICE VISIT (OUTPATIENT)
Dept: OBGYN CLINIC | Age: 73
End: 2018-08-28
Payer: MEDICARE

## 2018-08-28 VITALS
DIASTOLIC BLOOD PRESSURE: 92 MMHG | BODY MASS INDEX: 26.21 KG/M2 | SYSTOLIC BLOOD PRESSURE: 166 MMHG | WEIGHT: 130 LBS | HEIGHT: 59 IN

## 2018-08-28 DIAGNOSIS — N95.0 POSTMENOPAUSAL BLEEDING: Primary | ICD-10-CM

## 2018-08-28 DIAGNOSIS — N89.8 VAGINAL DISCHARGE: ICD-10-CM

## 2018-08-28 PROCEDURE — G8427 DOCREV CUR MEDS BY ELIG CLIN: HCPCS | Performed by: OBSTETRICS & GYNECOLOGY

## 2018-08-28 PROCEDURE — 4004F PT TOBACCO SCREEN RCVD TLK: CPT | Performed by: OBSTETRICS & GYNECOLOGY

## 2018-08-28 PROCEDURE — 4040F PNEUMOC VAC/ADMIN/RCVD: CPT | Performed by: OBSTETRICS & GYNECOLOGY

## 2018-08-28 PROCEDURE — G8400 PT W/DXA NO RESULTS DOC: HCPCS | Performed by: OBSTETRICS & GYNECOLOGY

## 2018-08-28 PROCEDURE — 1101F PT FALLS ASSESS-DOCD LE1/YR: CPT | Performed by: OBSTETRICS & GYNECOLOGY

## 2018-08-28 PROCEDURE — 99203 OFFICE O/P NEW LOW 30 MIN: CPT | Performed by: OBSTETRICS & GYNECOLOGY

## 2018-08-28 PROCEDURE — G8417 CALC BMI ABV UP PARAM F/U: HCPCS | Performed by: OBSTETRICS & GYNECOLOGY

## 2018-08-28 PROCEDURE — 1090F PRES/ABSN URINE INCON ASSESS: CPT | Performed by: OBSTETRICS & GYNECOLOGY

## 2018-08-28 PROCEDURE — 1123F ACP DISCUSS/DSCN MKR DOCD: CPT | Performed by: OBSTETRICS & GYNECOLOGY

## 2018-08-28 PROCEDURE — 3017F COLORECTAL CA SCREEN DOC REV: CPT | Performed by: OBSTETRICS & GYNECOLOGY

## 2018-08-28 ASSESSMENT — ENCOUNTER SYMPTOMS
VOMITING: 0
APNEA: 0
SHORTNESS OF BREATH: 0
ABDOMINAL PAIN: 1

## 2018-08-28 NOTE — PROGRESS NOTES
Subjective:      Patient ID:  Raúl Balderas is a 75 y.o. female with chief complaint of:  Chief Complaint   Patient presents with    New Patient     np here today referred np zunilda musa for endometrial thickening and pmb. pt denies any vaginal bleeding. had u/s and normal pap done back in january. Patient presents to discuss the results of a pelvic ultrasound done earlier this year. Patient has been bothered by some pelvic discomfort and a CAT scan was done as well as a pelvic ultrasound. The pelvic ultrasound revealed a thickened endometrium and patient was referred to GYN for further evaluation. Patient had a normal Pap smear this year. The patient denies any postmenopausal bleeding just a darkening of her vaginal discharge        Past Medical History:   Diagnosis Date    Anxiety     Asthma     Chronic back pain     Bilateral L5 S1 Radic on emg--surprisingly worse on the left than the right--pt's symptoms and her MRI show worse on the right    Depression     Fibromyalgia     Hyperlipidemia     Hypertension     Osteoarthritis     Type II diabetes mellitus, uncontrolled ([address]     Unspecified sleep apnea      Past Surgical History:   Procedure Laterality Date    BACK SURGERY      CARDIAC CATHETERIZATION  11/3/14     DR. MIRELES     COLONOSCOPY  08/29/2016    w/polypectomy     EYE SURGERY      NE ESOPHAGOGASTRODUODENOSCOPY TRANSORAL DIAGNOSTIC N/A 3/24/2017    EGD ESOPHAGOGASTRODUODENOSCOPY performed by Giancarlo Mae MD at [facility] N/A 2/8/2018    negative findings    NE REVISE MEDIAN N/CARPAL TUNNEL SURG Left 6/5/2017    LEFT  CARPAL TUNNEL RELEASE performed by Vikram Brown [facility] at [facility],3+V/N,MGPUJ N/A 2/8/2018    TONSILLECTOMY      UPPER GASTROINTESTINAL ENDOSCOPY  08/26/2016    w/bx      Family History   Problem Relation Age of Onset    Heart Disease Father     Arthritis Father    [employer] Arthritis Mother      Current Outpatient Prescriptions on File Prior to Visit   Medication Sig Dispense Refill    rosuvastatin (CRESTOR) 40 MG tablet Take 1 tablet by mouth every evening 90 tablet 0    metoprolol succinate (TOPROL XL) 200 MG extended release tablet 1 and 1/2 po q day 135 tablet 0    PARoxetine (PAXIL) 40 MG tablet TAKE 1 TABLET BY MOUTH ONCE DAILY IN THE MORNING 90 tablet 0    amLODIPine (NORVASC) 5 MG tablet TAKE 1 TABLET BY MOUTH ONCE DAILY 90 tablet 0    omeprazole (PRILOSEC) 20 MG delayed release capsule Take 1 capsule by mouth 2 times daily 90 capsule 0    HYDROcodone-acetaminophen (NORCO) 5-325 MG per tablet One po bid prn pain max of 2 per day. 60 tablet 0    rosuvastatin (CRESTOR) 20 MG tablet TAKE 1 TABLET BY MOUTH  NIGHTLY 90 tablet 0    insulin 70-30 (HUMULIN 70/30) (70-30) 100 UNIT per ML injection vial Inject 60 units bid please give 4 vials for a 30 day supply 4 vial 3    pregabalin (LYRICA) 25 MG capsule Take 1 capsule by mouth 3 times daily for 90 days. . 270 capsule 0     No current facility-administered medications on file prior to visit. Allergies:  Darvon [propoxyphene hcl]; Ibuprofen; and Metformin and related    Review of Systems   Constitutional: Negative for fatigue and fever. Respiratory: Negative for apnea and shortness of breath. Cardiovascular: Negative for chest pain and palpitations. Gastrointestinal: Positive for abdominal pain. Negative for vomiting. No bloating   Genitourinary: Positive for vaginal discharge. Negative for difficulty urinating, dysuria, pelvic pain and vaginal bleeding. Neurological: Negative for dizziness, weakness and light-headedness. Objective:   BP (!) 166/92 (Site: Right Arm, Position: Sitting, Cuff Size: Large Adult)   Ht 4' 11\" (1.499 m)   Wt 130 lb (59 kg)   LMP  (LMP Unknown)   BMI 26.26 kg/m²     Physical Exam   Constitutional: She is oriented to person, place, and time.  She appears well-developed and well-nourished. Neurological: She is alert and oriented to person, place, and time. Psychiatric: She has a normal mood and affect. Physical exam was deferred at this time patient is not comfortable however we'll like to return for her full exam and endometrial biopsy  Assessment:       Diagnosis Orders   1. Postmenopausal bleeding     2. Vaginal discharge           Plan:      No orders of the defined types were placed in this encounter. No orders of the defined types were placed in this encounter. Patient has a diagnostic mammogram scheduled for the end of September and we'll try to return for endometrial biopsy prior to. All risks benefits and diagnostic parameters were discussed in detail  Return emb, for EMB.      Madhavi Arguello, DO

## 2018-09-11 RX ORDER — PAROXETINE HYDROCHLORIDE 40 MG/1
TABLET, FILM COATED ORAL
Qty: 90 TABLET | Refills: 0 | Status: SHIPPED | OUTPATIENT
Start: 2018-09-11 | End: 2018-10-29 | Stop reason: SDUPTHER

## 2018-09-11 RX ORDER — OMEPRAZOLE 20 MG/1
20 CAPSULE, DELAYED RELEASE ORAL 2 TIMES DAILY
Qty: 90 CAPSULE | Refills: 0 | Status: SHIPPED | OUTPATIENT
Start: 2018-09-11 | End: 2019-01-23 | Stop reason: SDUPTHER

## 2018-09-11 RX ORDER — AMLODIPINE BESYLATE 5 MG/1
TABLET ORAL
Qty: 90 TABLET | Refills: 0 | Status: SHIPPED | OUTPATIENT
Start: 2018-09-11 | End: 2018-10-29 | Stop reason: SDUPTHER

## 2018-09-15 DIAGNOSIS — E78.49 OTHER HYPERLIPIDEMIA: ICD-10-CM

## 2018-09-15 DIAGNOSIS — R74.8 ELEVATED ALKALINE PHOSPHATASE LEVEL: ICD-10-CM

## 2018-09-15 LAB
ALBUMIN SERPL-MCNC: 4.1 G/DL (ref 3.9–4.9)
ALP BLD-CCNC: 151 U/L (ref 40–130)
ALT SERPL-CCNC: 20 U/L (ref 0–33)
AST SERPL-CCNC: 19 U/L (ref 0–35)
BILIRUB SERPL-MCNC: 0.3 MG/DL (ref 0–1.2)
BILIRUBIN DIRECT: <0.2 MG/DL (ref 0–0.3)
BILIRUBIN, INDIRECT: ABNORMAL MG/DL (ref 0–0.6)
CHOLESTEROL, TOTAL: 208 MG/DL (ref 0–199)
GAMMA GLUTAMYL TRANSFERASE: 29 U/L (ref 0–40)
HDLC SERPL-MCNC: 55 MG/DL (ref 40–59)
LDL CHOLESTEROL CALCULATED: 135 MG/DL (ref 0–129)
TOTAL PROTEIN: 6.8 G/DL (ref 6.4–8.1)
TRIGL SERPL-MCNC: 92 MG/DL (ref 0–200)

## 2018-09-24 DIAGNOSIS — M54.17 LUMBOSACRAL RADICULOPATHY AT L5: Chronic | ICD-10-CM

## 2018-09-24 DIAGNOSIS — F41.9 ANXIETY: ICD-10-CM

## 2018-09-24 DIAGNOSIS — Z79.899 HIGH RISK MEDICATION USE: ICD-10-CM

## 2018-09-24 DIAGNOSIS — M54.12 CERVICAL RADICULAR PAIN: ICD-10-CM

## 2018-09-24 DIAGNOSIS — M79.7 FIBROMYALGIA: ICD-10-CM

## 2018-09-24 RX ORDER — HYDROCODONE BITARTRATE AND ACETAMINOPHEN 5; 325 MG/1; MG/1
1 TABLET ORAL 2 TIMES DAILY PRN
Qty: 60 TABLET | Refills: 0 | Status: SHIPPED | OUTPATIENT
Start: 2018-09-24 | End: 2018-11-07 | Stop reason: SDUPTHER

## 2018-10-01 ENCOUNTER — OFFICE VISIT (OUTPATIENT)
Dept: FAMILY MEDICINE CLINIC | Age: 73
End: 2018-10-01
Payer: MEDICARE

## 2018-10-01 VITALS
OXYGEN SATURATION: 98 % | TEMPERATURE: 97.4 F | SYSTOLIC BLOOD PRESSURE: 170 MMHG | WEIGHT: 129.4 LBS | DIASTOLIC BLOOD PRESSURE: 90 MMHG | HEART RATE: 73 BPM | RESPIRATION RATE: 14 BRPM | BODY MASS INDEX: 26.08 KG/M2 | HEIGHT: 59 IN

## 2018-10-01 DIAGNOSIS — J20.9 ACUTE BRONCHITIS, UNSPECIFIED ORGANISM: Primary | ICD-10-CM

## 2018-10-01 DIAGNOSIS — E78.49 OTHER HYPERLIPIDEMIA: ICD-10-CM

## 2018-10-01 DIAGNOSIS — R06.2 WHEEZING: ICD-10-CM

## 2018-10-01 LAB
INFLUENZA A ANTIBODY: NORMAL
INFLUENZA B ANTIBODY: NORMAL

## 2018-10-01 PROCEDURE — 1090F PRES/ABSN URINE INCON ASSESS: CPT | Performed by: FAMILY MEDICINE

## 2018-10-01 PROCEDURE — G8417 CALC BMI ABV UP PARAM F/U: HCPCS | Performed by: FAMILY MEDICINE

## 2018-10-01 PROCEDURE — 87804 INFLUENZA ASSAY W/OPTIC: CPT | Performed by: FAMILY MEDICINE

## 2018-10-01 PROCEDURE — 1101F PT FALLS ASSESS-DOCD LE1/YR: CPT | Performed by: FAMILY MEDICINE

## 2018-10-01 PROCEDURE — 4040F PNEUMOC VAC/ADMIN/RCVD: CPT | Performed by: FAMILY MEDICINE

## 2018-10-01 PROCEDURE — 4004F PT TOBACCO SCREEN RCVD TLK: CPT | Performed by: FAMILY MEDICINE

## 2018-10-01 PROCEDURE — 3014F SCREEN MAMMO DOC REV: CPT | Performed by: FAMILY MEDICINE

## 2018-10-01 PROCEDURE — G8400 PT W/DXA NO RESULTS DOC: HCPCS | Performed by: FAMILY MEDICINE

## 2018-10-01 PROCEDURE — G8427 DOCREV CUR MEDS BY ELIG CLIN: HCPCS | Performed by: FAMILY MEDICINE

## 2018-10-01 PROCEDURE — 3017F COLORECTAL CA SCREEN DOC REV: CPT | Performed by: FAMILY MEDICINE

## 2018-10-01 PROCEDURE — 1123F ACP DISCUSS/DSCN MKR DOCD: CPT | Performed by: FAMILY MEDICINE

## 2018-10-01 PROCEDURE — G8484 FLU IMMUNIZE NO ADMIN: HCPCS | Performed by: FAMILY MEDICINE

## 2018-10-01 PROCEDURE — 99213 OFFICE O/P EST LOW 20 MIN: CPT | Performed by: FAMILY MEDICINE

## 2018-10-01 RX ORDER — DEXTROMETHORPHAN HYDROBROMIDE AND PROMETHAZINE HYDROCHLORIDE 15; 6.25 MG/5ML; MG/5ML
5 SYRUP ORAL 4 TIMES DAILY PRN
Qty: 150 ML | Refills: 0 | Status: SHIPPED | OUTPATIENT
Start: 2018-10-01 | End: 2018-10-08

## 2018-10-01 RX ORDER — LEVOFLOXACIN 500 MG/1
500 TABLET, FILM COATED ORAL DAILY
Qty: 10 TABLET | Refills: 0 | Status: SHIPPED | OUTPATIENT
Start: 2018-10-01 | End: 2018-10-11

## 2018-10-01 RX ORDER — EZETIMIBE 10 MG/1
10 TABLET ORAL DAILY
Qty: 90 TABLET | Refills: 0 | Status: SHIPPED | OUTPATIENT
Start: 2018-10-01 | End: 2019-02-12 | Stop reason: SDUPTHER

## 2018-10-01 ASSESSMENT — ENCOUNTER SYMPTOMS: ABDOMINAL DISTENTION: 0

## 2018-10-01 NOTE — PROGRESS NOTES
Take 1 tablet by mouth every evening 90 tablet 0    metoprolol succinate (TOPROL XL) 200 MG extended release tablet 1 and 1/2 po q day 135 tablet 0    insulin 70-30 (HUMULIN 70/30) (70-30) 100 UNIT per ML injection vial Inject 60 units bid please give 4 vials for a 30 day supply 4 vial 3    pregabalin (LYRICA) 25 MG capsule Take 1 capsule by mouth 3 times daily for 90 days. . 270 capsule 0     No current facility-administered medications for this visit. Family History   Problem Relation Age of Onset    Heart Disease Father     Arthritis Father     Arthritis Mother      Past Medical History:   Diagnosis Date    Anxiety     Asthma     Chronic back pain     Bilateral L5 S1 Radic on emg--surprisingly worse on the left than the right--pt's symptoms and her MRI show worse on the right    Depression     Fibromyalgia     Hyperlipidemia     Hypertension     Osteoarthritis     Type II diabetes mellitus, uncontrolled ([facility] [address]     Unspecified sleep apnea    chest xry appears neg for acute changes   Objective:   BP (!) 170/90   Pulse 73   Temp 97.4 °F (36.3 °C)   Resp 14   Ht 4' 11\" (1.499 m)   Wt 129 lb 6.4 oz (58.7 kg)   LMP  (LMP Unknown)   SpO2 98%   BMI 26.14 kg/m²     Physical Exam  Heent: T.Ms normal Nares patent but mucosa swollen Mild pharyngeal injection. No                Exudate. No lip or tongue lesions. Neck: Minimal anter cervical adenopathy. No masses or thyroid asymmetry  Lungs: faint scattered wheeze bilaterally. Few fine bi basilar rales. Heart: Rate reg   2/6 syst murmur along rub best heard. No gallop  Gen;:  No acute distress  Assessment:       Diagnosis Orders   1. Acute bronchitis, unspecified organism     2.  Wheezing  POCT Influenza A/B    XR CHEST STANDARD (2 VW)   3. Other hyperlipidemia           Plan:    add zetia to crestor   Orders Placed This Encounter   Procedures    XR CHEST STANDARD (2 VW)     Standing Status:   Future     Number of Occurrences:   1 Standing Expiration Date:   10/1/2019    POCT Influenza A/B     Orders Placed This Encounter   Medications    ezetimibe (ZETIA) 10 MG tablet     Sig: Take 1 tablet by mouth daily     Dispense:  90 tablet     Refill:  0    levofloxacin (LEVAQUIN) 500 MG tablet     Sig: Take 1 tablet by mouth daily for 10 days     Dispense:  10 tablet     Refill:  0    promethazine-dextromethorphan (PROMETHAZINE-DM) 6.25-15 MG/5ML syrup     Sig: Take 5 mLs by mouth 4 times daily as needed for Cough     Dispense:  150 mL     Refill:  0   f/u in 10 days if needed other wise after fasting blood work in 4 weeks

## 2018-10-11 ENCOUNTER — PROCEDURE VISIT (OUTPATIENT)
Dept: PHYSICAL MEDICINE AND REHAB | Age: 73
End: 2018-10-11
Payer: MEDICARE

## 2018-10-11 DIAGNOSIS — M79.10 MYALGIA: ICD-10-CM

## 2018-10-11 DIAGNOSIS — M79.7 FIBROMYALGIA: Primary | ICD-10-CM

## 2018-10-11 PROCEDURE — 20553 NJX 1/MLT TRIGGER POINTS 3/>: CPT | Performed by: PHYSICAL MEDICINE & REHABILITATION

## 2018-10-12 RX ORDER — LIDOCAINE HYDROCHLORIDE 5 MG/ML
20 INJECTION, SOLUTION INFILTRATION; INTRAVENOUS ONCE
Status: COMPLETED | OUTPATIENT
Start: 2018-10-12 | End: 2018-10-12

## 2018-10-12 RX ADMIN — LIDOCAINE HYDROCHLORIDE 20 ML: 5 INJECTION, SOLUTION INFILTRATION; INTRAVENOUS at 13:39

## 2018-10-29 DIAGNOSIS — G58.0 INTERCOSTAL NEUROPATHY: ICD-10-CM

## 2018-10-29 DIAGNOSIS — M54.10 DIABETIC RADICULOPATHY (HCC): ICD-10-CM

## 2018-10-29 DIAGNOSIS — M79.10 MYALGIA: ICD-10-CM

## 2018-10-29 DIAGNOSIS — E11.49 DIABETIC RADICULOPATHY (HCC): ICD-10-CM

## 2018-10-29 RX ORDER — AMLODIPINE BESYLATE 5 MG/1
TABLET ORAL
Qty: 90 TABLET | Refills: 0 | Status: SHIPPED | OUTPATIENT
Start: 2018-10-29 | End: 2019-02-12 | Stop reason: SDUPTHER

## 2018-10-29 RX ORDER — PAROXETINE HYDROCHLORIDE 40 MG/1
TABLET, FILM COATED ORAL
Qty: 90 TABLET | Refills: 0 | Status: SHIPPED | OUTPATIENT
Start: 2018-10-29 | End: 2019-02-12 | Stop reason: SDUPTHER

## 2018-11-07 ENCOUNTER — OFFICE VISIT (OUTPATIENT)
Dept: PHYSICAL MEDICINE AND REHAB | Age: 73
End: 2018-11-07
Payer: MEDICARE

## 2018-11-07 VITALS
WEIGHT: 131 LBS | SYSTOLIC BLOOD PRESSURE: 160 MMHG | BODY MASS INDEX: 30.32 KG/M2 | HEIGHT: 55 IN | DIASTOLIC BLOOD PRESSURE: 72 MMHG

## 2018-11-07 DIAGNOSIS — F41.9 ANXIETY: ICD-10-CM

## 2018-11-07 DIAGNOSIS — G58.0 INTERCOSTAL NEUROPATHY: ICD-10-CM

## 2018-11-07 DIAGNOSIS — E11.49 DIABETIC RADICULOPATHY (HCC): ICD-10-CM

## 2018-11-07 DIAGNOSIS — Z79.899 HIGH RISK MEDICATION USE: ICD-10-CM

## 2018-11-07 DIAGNOSIS — M54.17 LUMBOSACRAL RADICULOPATHY AT L5: Chronic | ICD-10-CM

## 2018-11-07 DIAGNOSIS — M47.26 OSTEOARTHRITIS OF SPINE WITH RADICULOPATHY, LUMBAR REGION: Primary | ICD-10-CM

## 2018-11-07 DIAGNOSIS — M54.12 CERVICAL RADICULAR PAIN: ICD-10-CM

## 2018-11-07 DIAGNOSIS — M79.7 FIBROMYALGIA: ICD-10-CM

## 2018-11-07 DIAGNOSIS — M54.10 DIABETIC RADICULOPATHY (HCC): ICD-10-CM

## 2018-11-07 DIAGNOSIS — M79.10 MYALGIA: ICD-10-CM

## 2018-11-07 PROCEDURE — 4004F PT TOBACCO SCREEN RCVD TLK: CPT | Performed by: PHYSICAL MEDICINE & REHABILITATION

## 2018-11-07 PROCEDURE — G8427 DOCREV CUR MEDS BY ELIG CLIN: HCPCS | Performed by: PHYSICAL MEDICINE & REHABILITATION

## 2018-11-07 PROCEDURE — 1090F PRES/ABSN URINE INCON ASSESS: CPT | Performed by: PHYSICAL MEDICINE & REHABILITATION

## 2018-11-07 PROCEDURE — 1123F ACP DISCUSS/DSCN MKR DOCD: CPT | Performed by: PHYSICAL MEDICINE & REHABILITATION

## 2018-11-07 PROCEDURE — 3014F SCREEN MAMMO DOC REV: CPT | Performed by: PHYSICAL MEDICINE & REHABILITATION

## 2018-11-07 PROCEDURE — G8484 FLU IMMUNIZE NO ADMIN: HCPCS | Performed by: PHYSICAL MEDICINE & REHABILITATION

## 2018-11-07 PROCEDURE — 99214 OFFICE O/P EST MOD 30 MIN: CPT | Performed by: PHYSICAL MEDICINE & REHABILITATION

## 2018-11-07 PROCEDURE — G8417 CALC BMI ABV UP PARAM F/U: HCPCS | Performed by: PHYSICAL MEDICINE & REHABILITATION

## 2018-11-07 PROCEDURE — 3046F HEMOGLOBIN A1C LEVEL >9.0%: CPT | Performed by: PHYSICAL MEDICINE & REHABILITATION

## 2018-11-07 PROCEDURE — G8400 PT W/DXA NO RESULTS DOC: HCPCS | Performed by: PHYSICAL MEDICINE & REHABILITATION

## 2018-11-07 PROCEDURE — 1101F PT FALLS ASSESS-DOCD LE1/YR: CPT | Performed by: PHYSICAL MEDICINE & REHABILITATION

## 2018-11-07 PROCEDURE — 3017F COLORECTAL CA SCREEN DOC REV: CPT | Performed by: PHYSICAL MEDICINE & REHABILITATION

## 2018-11-07 PROCEDURE — 2022F DILAT RTA XM EVC RTNOPTHY: CPT | Performed by: PHYSICAL MEDICINE & REHABILITATION

## 2018-11-07 PROCEDURE — 4040F PNEUMOC VAC/ADMIN/RCVD: CPT | Performed by: PHYSICAL MEDICINE & REHABILITATION

## 2018-11-07 RX ORDER — PREGABALIN 25 MG/1
25 CAPSULE ORAL 3 TIMES DAILY
Qty: 270 CAPSULE | Refills: 0 | Status: ON HOLD | OUTPATIENT
Start: 2018-11-07 | End: 2019-10-08

## 2018-11-07 RX ORDER — HYDROCODONE BITARTRATE AND ACETAMINOPHEN 5; 325 MG/1; MG/1
1 TABLET ORAL 2 TIMES DAILY PRN
Qty: 60 TABLET | Refills: 0 | Status: SHIPPED | OUTPATIENT
Start: 2018-11-07 | End: 2018-11-07

## 2018-11-07 RX ORDER — HYDROCODONE BITARTRATE AND ACETAMINOPHEN 5; 325 MG/1; MG/1
1 TABLET ORAL EVERY 8 HOURS PRN
Qty: 80 TABLET | Refills: 0 | Status: SHIPPED | OUTPATIENT
Start: 2018-11-07 | End: 2019-01-10 | Stop reason: SDUPTHER

## 2018-11-07 ASSESSMENT — ENCOUNTER SYMPTOMS
ABDOMINAL PAIN: 1
SHORTNESS OF BREATH: 0
CHEST TIGHTNESS: 0
EYES NEGATIVE: 1
ALLERGIC/IMMUNOLOGIC NEGATIVE: 1
ABDOMINAL DISTENTION: 0
NAUSEA: 0
DIARRHEA: 0
VOMITING: 0
CONSTIPATION: 0
BACK PAIN: 1
COLOR CHANGE: 1
WHEEZING: 0

## 2018-11-07 ASSESSMENT — CROHNS DISEASE ACTIVITY INDEX (CDAI): CDAI SCORE: 0

## 2018-11-07 NOTE — PROGRESS NOTES
Endocrine: Negative. Genitourinary: Negative for difficulty urinating, dysuria, frequency, hematuria, pelvic pain, urgency and vaginal discharge. Musculoskeletal: Positive for arthralgias, back pain and myalgias. Negative for gait problem, joint swelling and neck pain. Skin: Positive for color change. Allergic/Immunologic: Negative. Neurological: Negative for dizziness, weakness, light-headedness, numbness and headaches. Psychiatric/Behavioral: Positive for dysphoric mood and sleep disturbance. Negative for hallucinations. The patient is nervous/anxious. Objective    Vitals:    11/07/18 0913   BP: (!) 160/72   Site: Right Upper Arm   Position: Sitting   Cuff Size: Medium Adult   Weight: 131 lb (59.4 kg)   Height: 4' 1.32\" (1.253 m)     Pain Score: EIGHT     Physical Exam   Constitutional: She is oriented to person, place, and time. Vital signs are normal. She appears well-developed and well-nourished. Non-toxic appearance. She does not have a sickly appearance. She does not appear ill. No distress. HENT:   Head: Normocephalic and atraumatic. Right Ear: Hearing normal.   Left Ear: Hearing normal.   Nose: Nose normal.   Mouth/Throat: Oropharynx is clear and moist and mucous membranes are normal. No oral lesions. Normal dentition. No oropharyngeal exudate. dysphonic   Eyes: Pupils are equal, round, and reactive to light. Conjunctivae and EOM are normal. Right eye exhibits no chemosis, no discharge and no exudate. Left eye exhibits no chemosis, no discharge and no exudate. No scleral icterus. Neck: Normal range of motion. Neck supple. No JVD present. No neck rigidity. No tracheal deviation and no edema present. No thyromegaly present. Cardiovascular: Normal rate, regular rhythm, normal heart sounds and intact distal pulses. Exam reveals no gallop, no friction rub and no decreased pulses. No murmur heard. Pulmonary/Chest: Effort normal. No accessory muscle usage.  No apnea, tender    Numbness left flank             Neurological: She is alert and oriented to person, place, and time. She displays abnormal reflex. She displays no atrophy and no tremor. A sensory deficit is present. No cranial nerve deficit. She exhibits normal muscle tone. Gait abnormal. Coordination normal. She displays no Babinski's sign on the right side. She displays no Babinski's sign on the left side. Skin: Skin is warm, dry and intact. No abrasion, no bruising, no ecchymosis, no laceration, no petechiae and no rash noted. Rash is not macular, not pustular and not urticarial. She is not diaphoretic. No cyanosis or erythema. No pallor. Nails show no clubbing. PVD discoloration bilateral lower extremities   Psychiatric: Her behavior is normal. Judgment and thought content normal. Her mood appears not anxious. Her affect is not angry, not blunt, not labile and not inappropriate. Her speech is tangential. She is not agitated, not aggressive, not hyperactive, not slowed, not withdrawn, not actively hallucinating and not combative. Thought content is not paranoid and not delusional. Cognition and memory are normal. Cognition and memory are not impaired. She does not express impulsivity or inappropriate judgment. She exhibits a depressed mood. She expresses no homicidal and no suicidal ideation. She expresses no suicidal plans and no homicidal plans. She exhibits normal recent memory and normal remote memory. She is attentive. Vitals reviewed. Ortho Exam  Neurologic Exam     Mental Status   Oriented to person, place, and time. Cranial Nerves     CN III, IV, VI   Pupils are equal, round, and reactive to light.   Extraocular motions are normal.         After a thorough review and discussion of the previous medical records, patient comprehensive medical, surgical, and family and social history, Review of Systems, their OARRS, their Screener and Opioid Assessment for Patients with Pain (SOAPP®-R), recent

## 2019-01-10 ENCOUNTER — OFFICE VISIT (OUTPATIENT)
Dept: PHYSICAL MEDICINE AND REHAB | Age: 74
End: 2019-01-10
Payer: MEDICARE

## 2019-01-10 VITALS
SYSTOLIC BLOOD PRESSURE: 162 MMHG | WEIGHT: 132 LBS | HEIGHT: 55 IN | BODY MASS INDEX: 30.55 KG/M2 | DIASTOLIC BLOOD PRESSURE: 82 MMHG

## 2019-01-10 DIAGNOSIS — E11.49 DIABETIC RADICULOPATHY (HCC): ICD-10-CM

## 2019-01-10 DIAGNOSIS — M54.12 CERVICAL RADICULAR PAIN: ICD-10-CM

## 2019-01-10 DIAGNOSIS — M54.10 DIABETIC RADICULOPATHY (HCC): ICD-10-CM

## 2019-01-10 DIAGNOSIS — M47.26 OSTEOARTHRITIS OF SPINE WITH RADICULOPATHY, LUMBAR REGION: ICD-10-CM

## 2019-01-10 DIAGNOSIS — M54.17 LUMBOSACRAL RADICULOPATHY AT L5: Chronic | ICD-10-CM

## 2019-01-10 DIAGNOSIS — Z79.899 HIGH RISK MEDICATION USE: ICD-10-CM

## 2019-01-10 DIAGNOSIS — M79.10 MYALGIA: ICD-10-CM

## 2019-01-10 DIAGNOSIS — G58.0 INTERCOSTAL NEUROPATHY: ICD-10-CM

## 2019-01-10 PROCEDURE — 4004F PT TOBACCO SCREEN RCVD TLK: CPT | Performed by: PHYSICAL MEDICINE & REHABILITATION

## 2019-01-10 PROCEDURE — 3014F SCREEN MAMMO DOC REV: CPT | Performed by: PHYSICAL MEDICINE & REHABILITATION

## 2019-01-10 PROCEDURE — 4040F PNEUMOC VAC/ADMIN/RCVD: CPT | Performed by: PHYSICAL MEDICINE & REHABILITATION

## 2019-01-10 PROCEDURE — G8427 DOCREV CUR MEDS BY ELIG CLIN: HCPCS | Performed by: PHYSICAL MEDICINE & REHABILITATION

## 2019-01-10 PROCEDURE — G8484 FLU IMMUNIZE NO ADMIN: HCPCS | Performed by: PHYSICAL MEDICINE & REHABILITATION

## 2019-01-10 PROCEDURE — 1123F ACP DISCUSS/DSCN MKR DOCD: CPT | Performed by: PHYSICAL MEDICINE & REHABILITATION

## 2019-01-10 PROCEDURE — 3046F HEMOGLOBIN A1C LEVEL >9.0%: CPT | Performed by: PHYSICAL MEDICINE & REHABILITATION

## 2019-01-10 PROCEDURE — 1090F PRES/ABSN URINE INCON ASSESS: CPT | Performed by: PHYSICAL MEDICINE & REHABILITATION

## 2019-01-10 PROCEDURE — 1101F PT FALLS ASSESS-DOCD LE1/YR: CPT | Performed by: PHYSICAL MEDICINE & REHABILITATION

## 2019-01-10 PROCEDURE — 2022F DILAT RTA XM EVC RTNOPTHY: CPT | Performed by: PHYSICAL MEDICINE & REHABILITATION

## 2019-01-10 PROCEDURE — 99214 OFFICE O/P EST MOD 30 MIN: CPT | Performed by: PHYSICAL MEDICINE & REHABILITATION

## 2019-01-10 PROCEDURE — 3017F COLORECTAL CA SCREEN DOC REV: CPT | Performed by: PHYSICAL MEDICINE & REHABILITATION

## 2019-01-10 PROCEDURE — G8417 CALC BMI ABV UP PARAM F/U: HCPCS | Performed by: PHYSICAL MEDICINE & REHABILITATION

## 2019-01-10 PROCEDURE — G8400 PT W/DXA NO RESULTS DOC: HCPCS | Performed by: PHYSICAL MEDICINE & REHABILITATION

## 2019-01-10 RX ORDER — HYDROCODONE BITARTRATE AND ACETAMINOPHEN 5; 325 MG/1; MG/1
1 TABLET ORAL EVERY 8 HOURS PRN
Qty: 80 TABLET | Refills: 0 | Status: SHIPPED | OUTPATIENT
Start: 2019-01-10 | End: 2019-03-13 | Stop reason: SDUPTHER

## 2019-01-10 RX ORDER — NALOXONE HYDROCHLORIDE 4 MG/.1ML
1 SPRAY NASAL PRN
Qty: 1 EACH | Refills: 2 | Status: SHIPPED | OUTPATIENT
Start: 2019-01-10 | End: 2019-03-27

## 2019-01-10 ASSESSMENT — ENCOUNTER SYMPTOMS
WHEEZING: 0
EYES NEGATIVE: 1
DIARRHEA: 0
ABDOMINAL PAIN: 1
SHORTNESS OF BREATH: 0
COLOR CHANGE: 1
CHEST TIGHTNESS: 0
NAUSEA: 0
ABDOMINAL DISTENTION: 0
BACK PAIN: 1
CONSTIPATION: 0
ALLERGIC/IMMUNOLOGIC NEGATIVE: 1
VOMITING: 0

## 2019-01-10 ASSESSMENT — CROHNS DISEASE ACTIVITY INDEX (CDAI): CDAI SCORE: 0

## 2019-01-19 DIAGNOSIS — E78.49 OTHER HYPERLIPIDEMIA: ICD-10-CM

## 2019-01-19 LAB
ALBUMIN SERPL-MCNC: 4 G/DL (ref 3.9–4.9)
ALP BLD-CCNC: 147 U/L (ref 40–130)
ALT SERPL-CCNC: 23 U/L (ref 0–33)
AST SERPL-CCNC: 21 U/L (ref 0–35)
BILIRUB SERPL-MCNC: <0.2 MG/DL (ref 0–1.2)
BILIRUBIN DIRECT: <0.2 MG/DL (ref 0–0.3)
BILIRUBIN, INDIRECT: ABNORMAL MG/DL (ref 0–0.6)
CHOLESTEROL, TOTAL: 220 MG/DL (ref 0–199)
HDLC SERPL-MCNC: 59 MG/DL (ref 40–59)
LDL CHOLESTEROL CALCULATED: 140 MG/DL (ref 0–129)
TOTAL PROTEIN: 6.7 G/DL (ref 6.4–8.1)
TRIGL SERPL-MCNC: 105 MG/DL (ref 0–200)

## 2019-01-23 RX ORDER — OMEPRAZOLE 20 MG/1
20 CAPSULE, DELAYED RELEASE ORAL 2 TIMES DAILY
Qty: 20 CAPSULE | Refills: 0 | Status: SHIPPED | OUTPATIENT
Start: 2019-01-23 | End: 2019-02-12 | Stop reason: SDUPTHER

## 2019-02-12 ENCOUNTER — OFFICE VISIT (OUTPATIENT)
Dept: FAMILY MEDICINE CLINIC | Age: 74
End: 2019-02-12
Payer: MEDICARE

## 2019-02-12 VITALS
SYSTOLIC BLOOD PRESSURE: 158 MMHG | BODY MASS INDEX: 26.69 KG/M2 | OXYGEN SATURATION: 100 % | HEIGHT: 59 IN | WEIGHT: 132.4 LBS | RESPIRATION RATE: 14 BRPM | TEMPERATURE: 97.1 F | DIASTOLIC BLOOD PRESSURE: 82 MMHG | HEART RATE: 104 BPM

## 2019-02-12 DIAGNOSIS — F33.42 MAJOR DEPRESSIVE DISORDER, RECURRENT, IN FULL REMISSION (HCC): ICD-10-CM

## 2019-02-12 DIAGNOSIS — E11.49 TYPE II DIABETES MELLITUS WITH NEUROLOGICAL MANIFESTATIONS (HCC): ICD-10-CM

## 2019-02-12 DIAGNOSIS — R09.89 BILATERAL CAROTID BRUITS: ICD-10-CM

## 2019-02-12 DIAGNOSIS — I34.0 MILD MITRAL REGURGITATION: ICD-10-CM

## 2019-02-12 DIAGNOSIS — K21.9 GASTROESOPHAGEAL REFLUX DISEASE WITHOUT ESOPHAGITIS: ICD-10-CM

## 2019-02-12 DIAGNOSIS — I10 ESSENTIAL HYPERTENSION: Primary | ICD-10-CM

## 2019-02-12 DIAGNOSIS — F41.9 ANXIETY: ICD-10-CM

## 2019-02-12 DIAGNOSIS — E78.49 OTHER HYPERLIPIDEMIA: ICD-10-CM

## 2019-02-12 PROCEDURE — 1123F ACP DISCUSS/DSCN MKR DOCD: CPT | Performed by: FAMILY MEDICINE

## 2019-02-12 PROCEDURE — G8484 FLU IMMUNIZE NO ADMIN: HCPCS | Performed by: FAMILY MEDICINE

## 2019-02-12 PROCEDURE — 3014F SCREEN MAMMO DOC REV: CPT | Performed by: FAMILY MEDICINE

## 2019-02-12 PROCEDURE — G8417 CALC BMI ABV UP PARAM F/U: HCPCS | Performed by: FAMILY MEDICINE

## 2019-02-12 PROCEDURE — 99214 OFFICE O/P EST MOD 30 MIN: CPT | Performed by: FAMILY MEDICINE

## 2019-02-12 PROCEDURE — 3017F COLORECTAL CA SCREEN DOC REV: CPT | Performed by: FAMILY MEDICINE

## 2019-02-12 PROCEDURE — 4004F PT TOBACCO SCREEN RCVD TLK: CPT | Performed by: FAMILY MEDICINE

## 2019-02-12 PROCEDURE — 3046F HEMOGLOBIN A1C LEVEL >9.0%: CPT | Performed by: FAMILY MEDICINE

## 2019-02-12 PROCEDURE — 2022F DILAT RTA XM EVC RTNOPTHY: CPT | Performed by: FAMILY MEDICINE

## 2019-02-12 PROCEDURE — 4040F PNEUMOC VAC/ADMIN/RCVD: CPT | Performed by: FAMILY MEDICINE

## 2019-02-12 PROCEDURE — G8510 SCR DEP NEG, NO PLAN REQD: HCPCS | Performed by: FAMILY MEDICINE

## 2019-02-12 PROCEDURE — G8427 DOCREV CUR MEDS BY ELIG CLIN: HCPCS | Performed by: FAMILY MEDICINE

## 2019-02-12 PROCEDURE — 1101F PT FALLS ASSESS-DOCD LE1/YR: CPT | Performed by: FAMILY MEDICINE

## 2019-02-12 PROCEDURE — G8400 PT W/DXA NO RESULTS DOC: HCPCS | Performed by: FAMILY MEDICINE

## 2019-02-12 PROCEDURE — 1090F PRES/ABSN URINE INCON ASSESS: CPT | Performed by: FAMILY MEDICINE

## 2019-02-12 RX ORDER — EZETIMIBE 10 MG/1
10 TABLET ORAL DAILY
Qty: 30 TABLET | Refills: 1 | Status: SHIPPED | OUTPATIENT
Start: 2019-02-12 | End: 2019-09-30

## 2019-02-12 RX ORDER — OMEPRAZOLE 20 MG/1
20 CAPSULE, DELAYED RELEASE ORAL 2 TIMES DAILY
Qty: 60 CAPSULE | Refills: 1 | Status: SHIPPED | OUTPATIENT
Start: 2019-02-12 | End: 2019-03-27

## 2019-02-12 RX ORDER — PAROXETINE HYDROCHLORIDE 40 MG/1
TABLET, FILM COATED ORAL
Qty: 30 TABLET | Refills: 1 | Status: SHIPPED | OUTPATIENT
Start: 2019-02-12 | End: 2019-07-02 | Stop reason: SDUPTHER

## 2019-02-12 RX ORDER — COLESEVELAM HYDROCHLORIDE 3.75 G/1
3.75 POWDER, FOR SUSPENSION ORAL DAILY
Qty: 30 EACH | Refills: 1 | Status: SHIPPED | OUTPATIENT
Start: 2019-02-12 | End: 2019-03-27

## 2019-02-12 RX ORDER — METOPROLOL SUCCINATE 200 MG/1
TABLET, EXTENDED RELEASE ORAL
Qty: 60 TABLET | Refills: 1 | Status: SHIPPED | OUTPATIENT
Start: 2019-02-12 | End: 2019-09-27 | Stop reason: SDUPTHER

## 2019-02-12 RX ORDER — AMLODIPINE BESYLATE 5 MG/1
TABLET ORAL
Qty: 30 TABLET | Refills: 1 | Status: ON HOLD | OUTPATIENT
Start: 2019-02-12 | End: 2019-03-25 | Stop reason: HOSPADM

## 2019-02-12 ASSESSMENT — PATIENT HEALTH QUESTIONNAIRE - PHQ9
1. LITTLE INTEREST OR PLEASURE IN DOING THINGS: 1
SUM OF ALL RESPONSES TO PHQ QUESTIONS 1-9: 2
SUM OF ALL RESPONSES TO PHQ QUESTIONS 1-9: 2
SUM OF ALL RESPONSES TO PHQ9 QUESTIONS 1 & 2: 2
2. FEELING DOWN, DEPRESSED OR HOPELESS: 1

## 2019-02-12 ASSESSMENT — ENCOUNTER SYMPTOMS
SHORTNESS OF BREATH: 0
ABDOMINAL PAIN: 0

## 2019-03-06 ENCOUNTER — HOSPITAL ENCOUNTER (OUTPATIENT)
Dept: NON INVASIVE DIAGNOSTICS | Age: 74
Discharge: HOME | End: 2019-03-06
Payer: MEDICARE

## 2019-03-06 DIAGNOSIS — I34.0 MILD MITRAL REGURGITATION: ICD-10-CM

## 2019-03-06 LAB
LV EF: 70 %
LVEF MODALITY: NORMAL

## 2019-03-06 PROCEDURE — 93306 TTE W/DOPPLER COMPLETE: CPT

## 2019-03-08 ENCOUNTER — HOSPITAL ENCOUNTER (OUTPATIENT)
Dept: ULTRASOUND IMAGING | Age: 74
Discharge: HOME | End: 2019-03-10
Payer: MEDICARE

## 2019-03-08 ENCOUNTER — PROCEDURE VISIT (OUTPATIENT)
Dept: PHYSICAL MEDICINE AND REHAB | Age: 74
End: 2019-03-08
Payer: MEDICARE

## 2019-03-08 DIAGNOSIS — M54.31 RIGHT SCIATIC NERVE PAIN: Primary | ICD-10-CM

## 2019-03-08 DIAGNOSIS — R09.89 BILATERAL CAROTID BRUITS: ICD-10-CM

## 2019-03-08 PROCEDURE — 64445 NJX AA&/STRD SCIATIC NRV IMG: CPT | Performed by: PHYSICAL MEDICINE & REHABILITATION

## 2019-03-08 PROCEDURE — 93880 EXTRACRANIAL BILAT STUDY: CPT

## 2019-03-08 PROCEDURE — 76942 ECHO GUIDE FOR BIOPSY: CPT | Performed by: PHYSICAL MEDICINE & REHABILITATION

## 2019-03-13 ENCOUNTER — OFFICE VISIT (OUTPATIENT)
Dept: PHYSICAL MEDICINE AND REHAB | Age: 74
End: 2019-03-13
Payer: MEDICARE

## 2019-03-13 VITALS
HEIGHT: 59 IN | WEIGHT: 131 LBS | DIASTOLIC BLOOD PRESSURE: 80 MMHG | BODY MASS INDEX: 26.41 KG/M2 | SYSTOLIC BLOOD PRESSURE: 140 MMHG

## 2019-03-13 DIAGNOSIS — M54.17 LUMBOSACRAL RADICULOPATHY AT L5: Chronic | ICD-10-CM

## 2019-03-13 DIAGNOSIS — M54.10 DIABETIC RADICULOPATHY (HCC): ICD-10-CM

## 2019-03-13 DIAGNOSIS — Z79.899 HIGH RISK MEDICATION USE: ICD-10-CM

## 2019-03-13 DIAGNOSIS — M47.26 OSTEOARTHRITIS OF SPINE WITH RADICULOPATHY, LUMBAR REGION: ICD-10-CM

## 2019-03-13 DIAGNOSIS — G58.0 INTERCOSTAL NEUROPATHY: ICD-10-CM

## 2019-03-13 DIAGNOSIS — E11.49 DIABETIC RADICULOPATHY (HCC): ICD-10-CM

## 2019-03-13 DIAGNOSIS — M79.10 MYALGIA: ICD-10-CM

## 2019-03-13 DIAGNOSIS — M54.12 CERVICAL RADICULAR PAIN: ICD-10-CM

## 2019-03-13 PROCEDURE — 3014F SCREEN MAMMO DOC REV: CPT | Performed by: PHYSICAL MEDICINE & REHABILITATION

## 2019-03-13 PROCEDURE — 4004F PT TOBACCO SCREEN RCVD TLK: CPT | Performed by: PHYSICAL MEDICINE & REHABILITATION

## 2019-03-13 PROCEDURE — 1101F PT FALLS ASSESS-DOCD LE1/YR: CPT | Performed by: PHYSICAL MEDICINE & REHABILITATION

## 2019-03-13 PROCEDURE — 3046F HEMOGLOBIN A1C LEVEL >9.0%: CPT | Performed by: PHYSICAL MEDICINE & REHABILITATION

## 2019-03-13 PROCEDURE — G8427 DOCREV CUR MEDS BY ELIG CLIN: HCPCS | Performed by: PHYSICAL MEDICINE & REHABILITATION

## 2019-03-13 PROCEDURE — 2022F DILAT RTA XM EVC RTNOPTHY: CPT | Performed by: PHYSICAL MEDICINE & REHABILITATION

## 2019-03-13 PROCEDURE — 1090F PRES/ABSN URINE INCON ASSESS: CPT | Performed by: PHYSICAL MEDICINE & REHABILITATION

## 2019-03-13 PROCEDURE — 99214 OFFICE O/P EST MOD 30 MIN: CPT | Performed by: PHYSICAL MEDICINE & REHABILITATION

## 2019-03-13 PROCEDURE — 1123F ACP DISCUSS/DSCN MKR DOCD: CPT | Performed by: PHYSICAL MEDICINE & REHABILITATION

## 2019-03-13 PROCEDURE — G8484 FLU IMMUNIZE NO ADMIN: HCPCS | Performed by: PHYSICAL MEDICINE & REHABILITATION

## 2019-03-13 PROCEDURE — G8400 PT W/DXA NO RESULTS DOC: HCPCS | Performed by: PHYSICAL MEDICINE & REHABILITATION

## 2019-03-13 PROCEDURE — 4040F PNEUMOC VAC/ADMIN/RCVD: CPT | Performed by: PHYSICAL MEDICINE & REHABILITATION

## 2019-03-13 PROCEDURE — 3017F COLORECTAL CA SCREEN DOC REV: CPT | Performed by: PHYSICAL MEDICINE & REHABILITATION

## 2019-03-13 PROCEDURE — G8417 CALC BMI ABV UP PARAM F/U: HCPCS | Performed by: PHYSICAL MEDICINE & REHABILITATION

## 2019-03-13 RX ORDER — HYDROCODONE BITARTRATE AND ACETAMINOPHEN 5; 325 MG/1; MG/1
1 TABLET ORAL EVERY 8 HOURS PRN
Qty: 80 TABLET | Refills: 0 | Status: SHIPPED | OUTPATIENT
Start: 2019-03-13 | End: 2019-04-29 | Stop reason: SDUPTHER

## 2019-03-13 ASSESSMENT — ENCOUNTER SYMPTOMS
ABDOMINAL DISTENTION: 0
ALLERGIC/IMMUNOLOGIC NEGATIVE: 1
CONSTIPATION: 0
RESPIRATORY NEGATIVE: 1
NAUSEA: 0
BACK PAIN: 1
ABDOMINAL PAIN: 1
CHEST TIGHTNESS: 0
SHORTNESS OF BREATH: 0
VOMITING: 0
DIARRHEA: 0
EYES NEGATIVE: 1
COLOR CHANGE: 1
WHEEZING: 0

## 2019-03-13 ASSESSMENT — PATIENT HEALTH QUESTIONNAIRE - PHQ9
SUM OF ALL RESPONSES TO PHQ QUESTIONS 1-9: 1
SUM OF ALL RESPONSES TO PHQ QUESTIONS 1-9: 1
1. LITTLE INTEREST OR PLEASURE IN DOING THINGS: 1

## 2019-03-13 ASSESSMENT — CROHNS DISEASE ACTIVITY INDEX (CDAI): CDAI SCORE: 0

## 2019-03-14 RX ORDER — LIDOCAINE HYDROCHLORIDE 20 MG/ML
10 INJECTION, SOLUTION INFILTRATION; PERINEURAL ONCE
Status: COMPLETED | OUTPATIENT
Start: 2019-03-14 | End: 2019-03-14

## 2019-03-14 RX ADMIN — LIDOCAINE HYDROCHLORIDE 10 ML: 20 INJECTION, SOLUTION INFILTRATION; PERINEURAL at 15:12

## 2019-03-15 ENCOUNTER — OFFICE VISIT (OUTPATIENT)
Dept: ENDOCRINOLOGY | Age: 74
End: 2019-03-15
Payer: MEDICARE

## 2019-03-15 VITALS
WEIGHT: 129 LBS | SYSTOLIC BLOOD PRESSURE: 169 MMHG | BODY MASS INDEX: 26 KG/M2 | HEIGHT: 59 IN | DIASTOLIC BLOOD PRESSURE: 93 MMHG | HEART RATE: 84 BPM

## 2019-03-15 DIAGNOSIS — B35.1 ONYCHOMYCOSIS: ICD-10-CM

## 2019-03-15 DIAGNOSIS — M20.42 HAMMER TOES OF BOTH FEET: ICD-10-CM

## 2019-03-15 DIAGNOSIS — M20.41 HAMMER TOES OF BOTH FEET: ICD-10-CM

## 2019-03-15 LAB
GLUCOSE BLD-MCNC: 418 MG/DL
HBA1C MFR BLD: 14 %

## 2019-03-15 PROCEDURE — 3017F COLORECTAL CA SCREEN DOC REV: CPT | Performed by: INTERNAL MEDICINE

## 2019-03-15 PROCEDURE — 82962 GLUCOSE BLOOD TEST: CPT | Performed by: INTERNAL MEDICINE

## 2019-03-15 PROCEDURE — 3046F HEMOGLOBIN A1C LEVEL >9.0%: CPT | Performed by: INTERNAL MEDICINE

## 2019-03-15 PROCEDURE — G8400 PT W/DXA NO RESULTS DOC: HCPCS | Performed by: INTERNAL MEDICINE

## 2019-03-15 PROCEDURE — G8484 FLU IMMUNIZE NO ADMIN: HCPCS | Performed by: INTERNAL MEDICINE

## 2019-03-15 PROCEDURE — G8417 CALC BMI ABV UP PARAM F/U: HCPCS | Performed by: INTERNAL MEDICINE

## 2019-03-15 PROCEDURE — 83036 HEMOGLOBIN GLYCOSYLATED A1C: CPT | Performed by: INTERNAL MEDICINE

## 2019-03-15 PROCEDURE — 2022F DILAT RTA XM EVC RTNOPTHY: CPT | Performed by: INTERNAL MEDICINE

## 2019-03-15 PROCEDURE — 4004F PT TOBACCO SCREEN RCVD TLK: CPT | Performed by: INTERNAL MEDICINE

## 2019-03-15 PROCEDURE — 1090F PRES/ABSN URINE INCON ASSESS: CPT | Performed by: INTERNAL MEDICINE

## 2019-03-15 PROCEDURE — 3014F SCREEN MAMMO DOC REV: CPT | Performed by: INTERNAL MEDICINE

## 2019-03-15 PROCEDURE — G8427 DOCREV CUR MEDS BY ELIG CLIN: HCPCS | Performed by: INTERNAL MEDICINE

## 2019-03-15 PROCEDURE — 1101F PT FALLS ASSESS-DOCD LE1/YR: CPT | Performed by: INTERNAL MEDICINE

## 2019-03-15 PROCEDURE — 99213 OFFICE O/P EST LOW 20 MIN: CPT | Performed by: INTERNAL MEDICINE

## 2019-03-15 PROCEDURE — 4040F PNEUMOC VAC/ADMIN/RCVD: CPT | Performed by: INTERNAL MEDICINE

## 2019-03-15 PROCEDURE — 1123F ACP DISCUSS/DSCN MKR DOCD: CPT | Performed by: INTERNAL MEDICINE

## 2019-03-20 ENCOUNTER — OFFICE VISIT (OUTPATIENT)
Dept: OBGYN CLINIC | Age: 74
End: 2019-03-20
Payer: MEDICARE

## 2019-03-20 VITALS
HEIGHT: 59 IN | SYSTOLIC BLOOD PRESSURE: 176 MMHG | DIASTOLIC BLOOD PRESSURE: 82 MMHG | WEIGHT: 134 LBS | BODY MASS INDEX: 27.01 KG/M2

## 2019-03-20 DIAGNOSIS — N95.0 PMB (POSTMENOPAUSAL BLEEDING): ICD-10-CM

## 2019-03-20 DIAGNOSIS — E78.49 OTHER HYPERLIPIDEMIA: ICD-10-CM

## 2019-03-20 DIAGNOSIS — R93.89 ENDOMETRIAL THICKENING ON ULTRASOUND: Primary | ICD-10-CM

## 2019-03-20 DIAGNOSIS — I10 ESSENTIAL HYPERTENSION: ICD-10-CM

## 2019-03-20 LAB
ALBUMIN SERPL-MCNC: 4 G/DL (ref 3.5–4.6)
ALP BLD-CCNC: 124 U/L (ref 40–130)
ALT SERPL-CCNC: 33 U/L (ref 0–33)
ANION GAP SERPL CALCULATED.3IONS-SCNC: 11 MEQ/L (ref 9–15)
AST SERPL-CCNC: 38 U/L (ref 0–35)
BASOPHILS ABSOLUTE: 0 K/UL (ref 0–0.2)
BASOPHILS RELATIVE PERCENT: 0.7 %
BILIRUB SERPL-MCNC: 0.3 MG/DL (ref 0.2–0.7)
BUN BLDV-MCNC: 15 MG/DL (ref 8–23)
CALCIUM SERPL-MCNC: 9.9 MG/DL (ref 8.5–9.9)
CHLORIDE BLD-SCNC: 103 MEQ/L (ref 95–107)
CHOLESTEROL, TOTAL: 121 MG/DL (ref 0–199)
CO2: 26 MEQ/L (ref 20–31)
CREAT SERPL-MCNC: 0.66 MG/DL (ref 0.5–0.9)
EOSINOPHILS ABSOLUTE: 0.4 K/UL (ref 0–0.7)
EOSINOPHILS RELATIVE PERCENT: 6.2 %
GFR AFRICAN AMERICAN: >60
GFR NON-AFRICAN AMERICAN: >60
GLOBULIN: 2.9 G/DL (ref 2.3–3.5)
GLUCOSE BLD-MCNC: 260 MG/DL (ref 70–99)
HCT VFR BLD CALC: 44.3 % (ref 37–47)
HDLC SERPL-MCNC: 51 MG/DL (ref 40–59)
HEMOGLOBIN: 14.6 G/DL (ref 12–16)
LDL CHOLESTEROL CALCULATED: 48 MG/DL (ref 0–129)
LYMPHOCYTES ABSOLUTE: 2 K/UL (ref 1–4.8)
LYMPHOCYTES RELATIVE PERCENT: 31.9 %
MCH RBC QN AUTO: 30.1 PG (ref 27–31.3)
MCHC RBC AUTO-ENTMCNC: 32.9 % (ref 33–37)
MCV RBC AUTO: 91.7 FL (ref 82–100)
MONOCYTES ABSOLUTE: 0.4 K/UL (ref 0.2–0.8)
MONOCYTES RELATIVE PERCENT: 7 %
NEUTROPHILS ABSOLUTE: 3.4 K/UL (ref 1.4–6.5)
NEUTROPHILS RELATIVE PERCENT: 54.2 %
PDW BLD-RTO: 13.8 % (ref 11.5–14.5)
PLATELET # BLD: 318 K/UL (ref 130–400)
POTASSIUM SERPL-SCNC: 4.9 MEQ/L (ref 3.4–4.9)
RBC # BLD: 4.83 M/UL (ref 4.2–5.4)
SODIUM BLD-SCNC: 140 MEQ/L (ref 135–144)
TOTAL PROTEIN: 6.9 G/DL (ref 6.3–8)
TRIGL SERPL-MCNC: 111 MG/DL (ref 0–150)
WBC # BLD: 6.2 K/UL (ref 4.8–10.8)

## 2019-03-20 PROCEDURE — 1123F ACP DISCUSS/DSCN MKR DOCD: CPT | Performed by: OBSTETRICS & GYNECOLOGY

## 2019-03-20 PROCEDURE — G8417 CALC BMI ABV UP PARAM F/U: HCPCS | Performed by: OBSTETRICS & GYNECOLOGY

## 2019-03-20 PROCEDURE — 3014F SCREEN MAMMO DOC REV: CPT | Performed by: OBSTETRICS & GYNECOLOGY

## 2019-03-20 PROCEDURE — G8427 DOCREV CUR MEDS BY ELIG CLIN: HCPCS | Performed by: OBSTETRICS & GYNECOLOGY

## 2019-03-20 PROCEDURE — 99213 OFFICE O/P EST LOW 20 MIN: CPT | Performed by: OBSTETRICS & GYNECOLOGY

## 2019-03-20 PROCEDURE — 1090F PRES/ABSN URINE INCON ASSESS: CPT | Performed by: OBSTETRICS & GYNECOLOGY

## 2019-03-20 PROCEDURE — G8484 FLU IMMUNIZE NO ADMIN: HCPCS | Performed by: OBSTETRICS & GYNECOLOGY

## 2019-03-20 PROCEDURE — 4040F PNEUMOC VAC/ADMIN/RCVD: CPT | Performed by: OBSTETRICS & GYNECOLOGY

## 2019-03-20 PROCEDURE — 1101F PT FALLS ASSESS-DOCD LE1/YR: CPT | Performed by: OBSTETRICS & GYNECOLOGY

## 2019-03-20 PROCEDURE — 3017F COLORECTAL CA SCREEN DOC REV: CPT | Performed by: OBSTETRICS & GYNECOLOGY

## 2019-03-20 PROCEDURE — G8400 PT W/DXA NO RESULTS DOC: HCPCS | Performed by: OBSTETRICS & GYNECOLOGY

## 2019-03-20 PROCEDURE — 4004F PT TOBACCO SCREEN RCVD TLK: CPT | Performed by: OBSTETRICS & GYNECOLOGY

## 2019-03-20 RX ORDER — MISOPROSTOL 200 UG/1
200 TABLET ORAL EVERY 12 HOURS
Qty: 2 TABLET | Refills: 0 | Status: SHIPPED | OUTPATIENT
Start: 2019-03-20 | End: 2019-04-17

## 2019-03-20 ASSESSMENT — ENCOUNTER SYMPTOMS
ABDOMINAL PAIN: 1
APNEA: 0
SHORTNESS OF BREATH: 0

## 2019-03-24 ENCOUNTER — HOSPITAL ENCOUNTER (EMERGENCY)
Age: 74
Discharge: HOME | DRG: 291 | End: 2019-03-24
Attending: EMERGENCY MEDICINE
Payer: MEDICARE

## 2019-03-24 ENCOUNTER — APPOINTMENT (OUTPATIENT)
Dept: CT IMAGING | Age: 74
DRG: 291 | End: 2019-03-24
Payer: MEDICARE

## 2019-03-24 ENCOUNTER — APPOINTMENT (OUTPATIENT)
Dept: GENERAL RADIOLOGY | Age: 74
DRG: 291 | End: 2019-03-24
Payer: MEDICARE

## 2019-03-24 ENCOUNTER — HOSPITAL ENCOUNTER (INPATIENT)
Age: 74
LOS: 1 days | Discharge: HOME | DRG: 291 | End: 2019-03-25
Attending: FAMILY MEDICINE | Admitting: INTERNAL MEDICINE
Payer: MEDICARE

## 2019-03-24 VITALS
HEART RATE: 91 BPM | RESPIRATION RATE: 20 BRPM | DIASTOLIC BLOOD PRESSURE: 77 MMHG | TEMPERATURE: 97.8 F | HEIGHT: 59 IN | BODY MASS INDEX: 26.61 KG/M2 | OXYGEN SATURATION: 97 % | SYSTOLIC BLOOD PRESSURE: 190 MMHG | WEIGHT: 132 LBS

## 2019-03-24 DIAGNOSIS — I50.9 ACUTE CONGESTIVE HEART FAILURE, UNSPECIFIED HEART FAILURE TYPE (HCC): Primary | ICD-10-CM

## 2019-03-24 DIAGNOSIS — J45.21 MILD INTERMITTENT ASTHMA WITH EXACERBATION: Primary | ICD-10-CM

## 2019-03-24 PROBLEM — I50.41 ACUTE COMBINED SYSTOLIC AND DIASTOLIC CHF, NYHA CLASS 1 (HCC): Status: ACTIVE | Noted: 2019-03-24

## 2019-03-24 LAB
ALBUMIN SERPL-MCNC: 3.9 G/DL (ref 3.5–4.6)
ALP BLD-CCNC: 124 U/L (ref 40–130)
ALT SERPL-CCNC: 38 U/L (ref 0–33)
ANION GAP SERPL CALCULATED.3IONS-SCNC: 12 MEQ/L (ref 9–15)
AST SERPL-CCNC: 40 U/L (ref 0–35)
BILIRUB SERPL-MCNC: 0.3 MG/DL (ref 0.2–0.7)
BUN BLDV-MCNC: 10 MG/DL (ref 8–23)
CALCIUM SERPL-MCNC: 9.5 MG/DL (ref 8.5–9.9)
CHLORIDE BLD-SCNC: 102 MEQ/L (ref 95–107)
CO2: 26 MEQ/L (ref 20–31)
CREAT SERPL-MCNC: 0.59 MG/DL (ref 0.5–0.9)
EKG ATRIAL RATE: 85 BPM
EKG P AXIS: 70 DEGREES
EKG P-R INTERVAL: 128 MS
EKG Q-T INTERVAL: 398 MS
EKG QRS DURATION: 86 MS
EKG QTC CALCULATION (BAZETT): 473 MS
EKG R AXIS: 39 DEGREES
EKG T AXIS: 158 DEGREES
EKG VENTRICULAR RATE: 85 BPM
GFR AFRICAN AMERICAN: >60
GFR NON-AFRICAN AMERICAN: >60
GLOBULIN: 2.9 G/DL (ref 2.3–3.5)
GLUCOSE BLD-MCNC: 203 MG/DL (ref 70–99)
GLUCOSE BLD-MCNC: 270 MG/DL (ref 60–115)
GLUCOSE BLD-MCNC: 351 MG/DL (ref 60–115)
HCT VFR BLD CALC: 41.8 % (ref 37–47)
HEMOGLOBIN: 14.3 G/DL (ref 12–16)
INR BLD: 1
MCH RBC QN AUTO: 30.3 PG (ref 27–31.3)
MCHC RBC AUTO-ENTMCNC: 34.1 % (ref 33–37)
MCV RBC AUTO: 89.1 FL (ref 82–100)
PDW BLD-RTO: 13.5 % (ref 11.5–14.5)
PERFORMED ON: ABNORMAL
PERFORMED ON: ABNORMAL
PLATELET # BLD: 258 K/UL (ref 130–400)
POC CREATININE WHOLE BLOOD: 0.8
POTASSIUM REFLEX MAGNESIUM: 3.9 MEQ/L (ref 3.4–4.9)
PRO-BNP: 2653 PG/ML
PROTHROMBIN TIME: 10.2 SEC (ref 9–11.5)
RAPID INFLUENZA  B AGN: NEGATIVE
RAPID INFLUENZA A AGN: NEGATIVE
RBC # BLD: 4.7 M/UL (ref 4.2–5.4)
SODIUM BLD-SCNC: 140 MEQ/L (ref 135–144)
TOTAL PROTEIN: 6.8 G/DL (ref 6.3–8)
TROPONIN: <0.01 NG/ML (ref 0–0.01)
TROPONIN: <0.01 NG/ML (ref 0–0.01)
WBC # BLD: 7.6 K/UL (ref 4.8–10.8)

## 2019-03-24 PROCEDURE — 83880 ASSAY OF NATRIURETIC PEPTIDE: CPT

## 2019-03-24 PROCEDURE — 6370000000 HC RX 637 (ALT 250 FOR IP): Performed by: PHYSICIAN ASSISTANT

## 2019-03-24 PROCEDURE — 71275 CT ANGIOGRAPHY CHEST: CPT

## 2019-03-24 PROCEDURE — 93005 ELECTROCARDIOGRAM TRACING: CPT

## 2019-03-24 PROCEDURE — 6370000000 HC RX 637 (ALT 250 FOR IP): Performed by: INTERNAL MEDICINE

## 2019-03-24 PROCEDURE — 85027 COMPLETE CBC AUTOMATED: CPT

## 2019-03-24 PROCEDURE — 94640 AIRWAY INHALATION TREATMENT: CPT

## 2019-03-24 PROCEDURE — 99285 EMERGENCY DEPT VISIT HI MDM: CPT

## 2019-03-24 PROCEDURE — 96375 TX/PRO/DX INJ NEW DRUG ADDON: CPT

## 2019-03-24 PROCEDURE — 96367 TX/PROPH/DG ADDL SEQ IV INF: CPT

## 2019-03-24 PROCEDURE — 6360000004 HC RX CONTRAST MEDICATION: Performed by: PHYSICIAN ASSISTANT

## 2019-03-24 PROCEDURE — 96376 TX/PRO/DX INJ SAME DRUG ADON: CPT

## 2019-03-24 PROCEDURE — 6360000002 HC RX W HCPCS: Performed by: INTERNAL MEDICINE

## 2019-03-24 PROCEDURE — 85610 PROTHROMBIN TIME: CPT

## 2019-03-24 PROCEDURE — 36415 COLL VENOUS BLD VENIPUNCTURE: CPT

## 2019-03-24 PROCEDURE — 99223 1ST HOSP IP/OBS HIGH 75: CPT | Performed by: INTERNAL MEDICINE

## 2019-03-24 PROCEDURE — 99284 EMERGENCY DEPT VISIT MOD MDM: CPT

## 2019-03-24 PROCEDURE — 71045 X-RAY EXAM CHEST 1 VIEW: CPT

## 2019-03-24 PROCEDURE — 2580000003 HC RX 258: Performed by: PHYSICIAN ASSISTANT

## 2019-03-24 PROCEDURE — 87804 INFLUENZA ASSAY W/OPTIC: CPT

## 2019-03-24 PROCEDURE — 6360000002 HC RX W HCPCS: Performed by: PHYSICIAN ASSISTANT

## 2019-03-24 PROCEDURE — 84484 ASSAY OF TROPONIN QUANT: CPT

## 2019-03-24 PROCEDURE — 94664 DEMO&/EVAL PT USE INHALER: CPT

## 2019-03-24 PROCEDURE — 96365 THER/PROPH/DIAG IV INF INIT: CPT

## 2019-03-24 PROCEDURE — 87040 BLOOD CULTURE FOR BACTERIA: CPT

## 2019-03-24 PROCEDURE — 87149 DNA/RNA DIRECT PROBE: CPT

## 2019-03-24 PROCEDURE — 87077 CULTURE AEROBIC IDENTIFY: CPT

## 2019-03-24 PROCEDURE — 2060000000 HC ICU INTERMEDIATE R&B

## 2019-03-24 PROCEDURE — 80053 COMPREHEN METABOLIC PANEL: CPT

## 2019-03-24 PROCEDURE — 96372 THER/PROPH/DIAG INJ SC/IM: CPT

## 2019-03-24 RX ORDER — FUROSEMIDE 10 MG/ML
40 INJECTION INTRAMUSCULAR; INTRAVENOUS ONCE
Status: COMPLETED | OUTPATIENT
Start: 2019-03-24 | End: 2019-03-24

## 2019-03-24 RX ORDER — METHYLPREDNISOLONE SODIUM SUCCINATE 125 MG/2ML
125 INJECTION, POWDER, LYOPHILIZED, FOR SOLUTION INTRAMUSCULAR; INTRAVENOUS ONCE
Status: COMPLETED | OUTPATIENT
Start: 2019-03-24 | End: 2019-03-24

## 2019-03-24 RX ORDER — ALBUTEROL SULFATE 90 UG/1
2 AEROSOL, METERED RESPIRATORY (INHALATION) EVERY 6 HOURS PRN
Qty: 1 INHALER | Refills: 3 | Status: SHIPPED | OUTPATIENT
Start: 2019-03-24 | End: 2019-04-01 | Stop reason: SDUPTHER

## 2019-03-24 RX ORDER — NICOTINE POLACRILEX 4 MG
15 LOZENGE BUCCAL PRN
Status: DISCONTINUED | OUTPATIENT
Start: 2019-03-24 | End: 2019-03-25 | Stop reason: HOSPADM

## 2019-03-24 RX ORDER — ALBUTEROL SULFATE 0.63 MG/3ML
0.63 SOLUTION RESPIRATORY (INHALATION) EVERY 4 HOURS PRN
Status: DISCONTINUED | OUTPATIENT
Start: 2019-03-24 | End: 2019-03-24

## 2019-03-24 RX ORDER — LISINOPRIL 5 MG/1
5 TABLET ORAL DAILY
Status: DISCONTINUED | OUTPATIENT
Start: 2019-03-24 | End: 2019-03-25

## 2019-03-24 RX ORDER — ONDANSETRON 2 MG/ML
4 INJECTION INTRAMUSCULAR; INTRAVENOUS EVERY 6 HOURS PRN
Status: DISCONTINUED | OUTPATIENT
Start: 2019-03-24 | End: 2019-03-25 | Stop reason: HOSPADM

## 2019-03-24 RX ORDER — DEXTROSE MONOHYDRATE 25 G/50ML
12.5 INJECTION, SOLUTION INTRAVENOUS PRN
Status: DISCONTINUED | OUTPATIENT
Start: 2019-03-24 | End: 2019-03-25 | Stop reason: HOSPADM

## 2019-03-24 RX ORDER — FUROSEMIDE 10 MG/ML
20 INJECTION INTRAMUSCULAR; INTRAVENOUS ONCE
Status: COMPLETED | OUTPATIENT
Start: 2019-03-24 | End: 2019-03-24

## 2019-03-24 RX ORDER — IPRATROPIUM BROMIDE AND ALBUTEROL SULFATE 2.5; .5 MG/3ML; MG/3ML
1 SOLUTION RESPIRATORY (INHALATION)
Status: DISCONTINUED | OUTPATIENT
Start: 2019-03-24 | End: 2019-03-24

## 2019-03-24 RX ORDER — PAROXETINE 10 MG/1
10 TABLET, FILM COATED ORAL DAILY
Status: DISCONTINUED | OUTPATIENT
Start: 2019-03-25 | End: 2019-03-25 | Stop reason: HOSPADM

## 2019-03-24 RX ORDER — SODIUM CHLORIDE 0.9 % (FLUSH) 0.9 %
10 SYRINGE (ML) INJECTION PRN
Status: DISCONTINUED | OUTPATIENT
Start: 2019-03-24 | End: 2019-03-25 | Stop reason: HOSPADM

## 2019-03-24 RX ORDER — SODIUM CHLORIDE 9 MG/ML
INJECTION, SOLUTION INTRAVENOUS CONTINUOUS
Status: DISCONTINUED | OUTPATIENT
Start: 2019-03-24 | End: 2019-03-24

## 2019-03-24 RX ORDER — METOPROLOL SUCCINATE 50 MG/1
200 TABLET, EXTENDED RELEASE ORAL ONCE
Status: COMPLETED | OUTPATIENT
Start: 2019-03-24 | End: 2019-03-24

## 2019-03-24 RX ORDER — SODIUM CHLORIDE 0.9 % (FLUSH) 0.9 %
10 SYRINGE (ML) INJECTION EVERY 12 HOURS SCHEDULED
Status: DISCONTINUED | OUTPATIENT
Start: 2019-03-24 | End: 2019-03-25 | Stop reason: HOSPADM

## 2019-03-24 RX ORDER — DEXTROSE MONOHYDRATE 50 MG/ML
100 INJECTION, SOLUTION INTRAVENOUS PRN
Status: DISCONTINUED | OUTPATIENT
Start: 2019-03-24 | End: 2019-03-25 | Stop reason: HOSPADM

## 2019-03-24 RX ORDER — METHYLPREDNISOLONE 4 MG/1
TABLET ORAL
Qty: 1 KIT | Refills: 0 | Status: SHIPPED | OUTPATIENT
Start: 2019-03-24 | End: 2019-03-27

## 2019-03-24 RX ORDER — ALBUTEROL SULFATE 0.63 MG/3ML
0.63 SOLUTION RESPIRATORY (INHALATION) 3 TIMES DAILY
Status: DISCONTINUED | OUTPATIENT
Start: 2019-03-24 | End: 2019-03-25 | Stop reason: HOSPADM

## 2019-03-24 RX ORDER — ASPIRIN 325 MG
325 TABLET ORAL ONCE
Status: COMPLETED | OUTPATIENT
Start: 2019-03-24 | End: 2019-03-24

## 2019-03-24 RX ORDER — AMLODIPINE BESYLATE 5 MG/1
10 TABLET ORAL ONCE
Status: COMPLETED | OUTPATIENT
Start: 2019-03-24 | End: 2019-03-24

## 2019-03-24 RX ORDER — NITROGLYCERIN 0.4 MG/1
0.4 TABLET SUBLINGUAL EVERY 5 MIN PRN
Status: DISCONTINUED | OUTPATIENT
Start: 2019-03-24 | End: 2019-03-24

## 2019-03-24 RX ADMIN — FUROSEMIDE 20 MG: 10 INJECTION, SOLUTION INTRAMUSCULAR; INTRAVENOUS at 14:48

## 2019-03-24 RX ADMIN — IPRATROPIUM BROMIDE AND ALBUTEROL SULFATE 1 AMPULE: 2.5; .5 SOLUTION RESPIRATORY (INHALATION) at 16:16

## 2019-03-24 RX ADMIN — METOPROLOL SUCCINATE 200 MG: 50 TABLET, FILM COATED, EXTENDED RELEASE ORAL at 14:05

## 2019-03-24 RX ADMIN — ASPIRIN 325 MG: 325 TABLET, COATED ORAL at 12:07

## 2019-03-24 RX ADMIN — CEFTRIAXONE SODIUM 1 G: 1 INJECTION, POWDER, FOR SOLUTION INTRAMUSCULAR; INTRAVENOUS at 14:00

## 2019-03-24 RX ADMIN — AZITHROMYCIN MONOHYDRATE 500 MG: 500 INJECTION, POWDER, LYOPHILIZED, FOR SOLUTION INTRAVENOUS at 14:05

## 2019-03-24 RX ADMIN — IOPAMIDOL 100 ML: 612 INJECTION, SOLUTION INTRAVENOUS at 13:18

## 2019-03-24 RX ADMIN — LISINOPRIL 5 MG: 5 TABLET ORAL at 21:52

## 2019-03-24 RX ADMIN — SODIUM CHLORIDE: 9 INJECTION, SOLUTION INTRAVENOUS at 17:23

## 2019-03-24 RX ADMIN — SODIUM CHLORIDE: 9 INJECTION, SOLUTION INTRAVENOUS at 12:07

## 2019-03-24 RX ADMIN — ALBUTEROL SULFATE 0.63 MG: 0.63 SOLUTION INTRABRONCHIAL at 19:14

## 2019-03-24 RX ADMIN — FUROSEMIDE 40 MG: 10 INJECTION, SOLUTION INTRAMUSCULAR; INTRAVENOUS at 21:52

## 2019-03-24 RX ADMIN — AMLODIPINE BESYLATE 10 MG: 5 TABLET ORAL at 14:05

## 2019-03-24 RX ADMIN — METHYLPREDNISOLONE SODIUM SUCCINATE 125 MG: 125 INJECTION, POWDER, FOR SOLUTION INTRAMUSCULAR; INTRAVENOUS at 12:07

## 2019-03-24 RX ADMIN — ENOXAPARIN SODIUM 40 MG: 40 INJECTION SUBCUTANEOUS at 21:52

## 2019-03-24 RX ADMIN — IPRATROPIUM BROMIDE AND ALBUTEROL SULFATE 1 AMPULE: 2.5; .5 SOLUTION RESPIRATORY (INHALATION) at 11:54

## 2019-03-24 ASSESSMENT — PAIN SCALES - GENERAL
PAINLEVEL_OUTOF10: 0
PAINLEVEL_OUTOF10: 7
PAINLEVEL_OUTOF10: 0

## 2019-03-24 ASSESSMENT — ENCOUNTER SYMPTOMS
COLOR CHANGE: 0
ABDOMINAL PAIN: 0
COUGH: 1
ABDOMINAL DISTENTION: 0
CHOKING: 0
CHEST TIGHTNESS: 0
SHORTNESS OF BREATH: 1
BACK PAIN: 0
APNEA: 0

## 2019-03-24 ASSESSMENT — PAIN DESCRIPTION - LOCATION: LOCATION: HEAD

## 2019-03-24 ASSESSMENT — PULMONARY FUNCTION TESTS: PEFR_L/MIN: 100

## 2019-03-24 ASSESSMENT — PAIN DESCRIPTION - PAIN TYPE: TYPE: ACUTE PAIN

## 2019-03-24 ASSESSMENT — PAIN DESCRIPTION - DESCRIPTORS: DESCRIPTORS: ACHING

## 2019-03-24 NOTE — ED PROVIDER NOTES
[facility] ED  eMERGENCYdEPARTMENT eNCOUnter      Pt Name: Marleni Clinton  MRN: [id number]  Armslalitagfkristen [birth year]FV evaluation: 3/24/2019  Blake Jimenez MD    [address] [place] [address]       Chief Complaint   Patient presents with    Shortness of Breath     pt c/o SOB, Pt was seen her this AM and Dx with a exacerbation of asthma         HISTORY OF PRESENT ILLNESS  (Location/Symptom, Timing/Onset, Context/Setting, Quality, Duration, Modifying Factors, Severity.)   Marleni Clinton is a 75 y.o. female who presents to the emergency department with one-day history of shortness of breath. With a one-day history of shortness of breath. An ambulance was called to the home and pt received a breathing treatment. Feeling improved, pt declined transfer to ER. Pt went to sleep but woke around 3am. At this time family made pt come to the ER. She received another breathing treatment but reports no other workup. feeling improved, she went home again. This am SOB returned and pt is now back in the ER. Denies CP or abd pain. Pt notes SOB. No hemoptysis. No recent travel. Pt is not on home O2 nor routine respiratory medications. Pt is a diabetic and has hypertension. Denies h/o of CAD. Family members with Nebulizer at home pt \"we couldn't find it\"    HPI    Nursing Notes were reviewed and I agree. REVIEW OF SYSTEMS    (2-9 systems for level 4, 10 or more for level 5)     Review of Systems   Constitutional: Negative. HENT: Negative. Respiratory: Positive for shortness of breath. Genitourinary: Negative. All other systems reviewed and are negative. as noted above the remainder of the review of systems was reviewed and negative.        PAST MEDICAL HISTORY     Past Medical History:   Diagnosis Date    Anxiety     Asthma     Chronic back pain     Bilateral L5 S1 Radic on emg--surprisingly worse on the left than the right--pt's symptoms and her MRI show worse on the right    Depression  Fibromyalgia     Hyperlipidemia     Hypertension     Osteoarthritis     Type II diabetes mellitus, uncontrolled ([facility] [address]     Unspecified sleep apnea          SURGICAL HISTORY       Past Surgical History:   Procedure Laterality Date    BACK SURGERY      CARDIAC CATHETERIZATION  11/3/14     DR. MIRELES     COLONOSCOPY  08/29/2016    w/polypectomy     EYE SURGERY      W[place] ESOPHAGOGASTRODUODENOSCOPY TRANSORAL DIAGNOSTIC N/A 3/24/2017    EGD ESOPHAGOGASTRODUODENOSCOPY performed by [facility] Goldstein MD at [facility] N/A 2/8/2018    negative findings    WY REVISE MEDIAN N/CARPAL TUNNEL SURG Left 6/5/2017    LEFT  CARPAL TUNNEL RELEASE [facility]/I,LGNXT N/A 2/8/2018    TONSILLECTOMY      UPPER GASTROINTESTINAL ENDOSCOPY  08/26/2016    w/bx          CURRENT MEDICATIONS       Previous Medications    ALBUTEROL (PROVENTIL) (5 MG/ML) 0.5% NEBULIZER SOLUTION    Take 0.5 mLs by nebulization every 6 hours as needed for Wheezing    ALBUTEROL SULFATE HFA (PROVENTIL HFA) 108 (90 BASE) MCG/ACT INHALER    Inhale 2 puffs into the lungs every 6 hours as needed for Wheezing    AMLODIPINE (NORVASC) 5 MG TABLET    TAKE 1 TABLET BY MOUTH ONCE DAILY    COLESEVELAM (WELCHOL) 3.75 G PACK POWDER    Take 1 packet by mouth daily    EZETIMIBE (ZETIA) 10 MG TABLET    Take 1 tablet by mouth daily    HYDROCODONE-ACETAMINOPHEN (NORCO) 5-325 MG PER TABLET    Take 1 tablet by mouth every 8 hours as needed for Pain (max of 3 per day) for up to 30 days. Max of 80 per month    INSULIN 70-30 (HUMULIN 70/30) (70-30) 100 UNIT PER ML INJECTION VIAL    Inject 60 units bid please give 4 vials for a 30 day supply    METHYLPREDNISOLONE (MEDROL, ALMA,) 4 MG TABLET    Take by mouth.     METOPROLOL SUCCINATE (TOPROL XL) 200 MG EXTENDED RELEASE TABLET    2 po q day    MISOPROSTOL (CYTOTEC) 200 MCG TABLET    Take 1 tablet by mouth every 12 hours The day prior to procedure    NALOXONE (NARCAN) 4 MG/0.1ML LIQD NASAL SPRAY    1 spray by Nasal route as needed for Opioid Reversal    OMEPRAZOLE (PRILOSEC) 20 MG DELAYED RELEASE CAPSULE    Take 1 capsule by mouth 2 times daily    PAROXETINE (PAXIL) 40 MG TABLET    TAKE 1 TABLET BY MOUTH ONCE DAILY IN THE MORNING    PREGABALIN (LYRICA) 25 MG CAPSULE    Take 1 capsule by mouth 3 times daily for 90 days. Shubham Motto ROSUVASTATIN (CRESTOR) 40 MG TABLET    Take 1 tablet by mouth every evening       ALLERGIES     Darvon [propoxyphene hcl];  Ibuprofen; and Metformin and related    HISTORY       Family History   Problem Relation Age of Onset    Heart Disease Father     Arthritis Father     Arthritis Mother           SOCIAL HISTORY       Social History     Socioeconomic History    Marital status:      Spouse name: None    Number of children: None    Years of education: None    Highest education level: None   Occupational History    Occupation: Retired-   Social Needs    Financial resource strain: None    Food insecurity:     Worry: None     Inability: None    Transportation needs:     Medical: None     Non-medical: None   Tobacco Use    Smoking status: Heavy Tobacco Smoker     Packs/day: 1.00     Years: 15.00     Pack years: 15.00     Types: Cigarettes     Start date: 11/30/2017    Smokeless tobacco: Never Used    Tobacco comment: smokes about 4 cigarettes a day   Substance and Sexual Activity    Alcohol use: No    Drug use: No    Sexual activity: Yes   Lifestyle    Physical activity:     Days per week: None     Minutes per session: None    Stress: None   Relationships    Social connections:     Talks on phone: None     Gets together: None     Attends Taoism service: None     Active member of club or organization: None     Attends meetings of clubs or organizations: None     Relationship status: None    Intimate partner violence:     Fear of current or ex partner: None Emotionally abused: None     Physically abused: None     Forced sexual activity: None   Other Topics Concern    None   Social History Narrative    None       SCREENINGS    [place] Coma Scale  Eye Opening: Spontaneous  Best Verbal Response: Oriented  Best Motor Response: Obeys commands  [place] Coma Scale Score: 15      PHYSICAL EXAM    (up to 7 forlevel 4, 8 or more for level 5)     ED Triage Vitals [03/24/19 1131]   BP Temp Temp Source Pulse Resp SpO2 Height Weight   (!) 214/111 99.1 °F (37.3 °C) Oral 95 30 (!) 9 % 4' 11\" (1.499 m) 132 lb (59.9 kg)       Physical Exam   Constitutional: She is oriented to person, place, and time. She appears well-developed and well-nourished. BP (!) 171/74   Pulse 85   Temp 99.1 °F (37.3 °C) (Oral)   Resp 27   Ht 4' 11\" (1.499 m)   Wt 132 lb (59.9 kg)   LMP  (LMP Unknown)   SpO2 97%   BMI 26.66 kg/m²      HENT:   Head: Normocephalic and atraumatic. Right Ear: External ear normal.   Left Ear: External ear normal.   Nose: Nose normal.   Mouth/Throat: Oropharynx is clear and moist.   Eyes: Pupils are equal, round, and reactive to light. Neck: Normal range of motion. Neck supple. Cardiovascular: Normal rate, regular rhythm, normal heart sounds and intact distal pulses. Pulmonary/Chest: Effort normal and breath sounds normal. No respiratory distress. She has no wheezes. She has no rales. She exhibits no tenderness. Abdominal: Soft. Bowel sounds are normal.   Musculoskeletal: Normal range of motion. Neurological: She is alert and oriented to person, place, and time. She has normal strength. No cranial nerve deficit or sensory deficit. Reflex Scores:       Tricep reflexes are 2+ on the right side and 2+ on the left side. Bicep reflexes are 2+ on the right side and 2+ on the left side. Brachioradialis reflexes are 2+ on the right side and 2+ on the left side. Patellar reflexes are 2+ on the right side and 2+ on the left side.        Achilles reflexes are 2+ on the right side and 2+ on the left side. Skin: Skin is warm and dry. Psychiatric: She has a normal mood and affect. Her behavior is normal. Judgment and thought content normal.   Nursing note and vitals reviewed. DIAGNOSTIC RESULTS     RADIOLOGY:   Non-plain film images such as CT, Ultrasound and MRI are read by the radiologist. Plain radiographic images are visualized and preliminarilyinterpreted by William Ventura MD with the below findings:        Interpretation per the Radiologist below, if available at the time of this note:    CTA CHEST W WO CONTRAST   Final Result   NO EVIDENCE OF PULMONARY EMBOLISM. SMALL RIGHT EFFUSION. CHANGES SUGGESTIVE OF INTERSTITIAL PULMONARY EDEMA HAVE LUNG BASES. XR CHEST PORTABLE   Preliminary Result   PROBABLE BIBASILAR INFILTRATES. LABS:  Labs Reviewed   COMPREHENSIVE METABOLIC PANEL W/ REFLEX TO MG FOR LOW K - Abnormal; Notable for the following components:       Result Value    Glucose 203 (*)     ALT 38 (*)     AST 40 (*)     All other components within normal limits   POCT CREATININE - Normal   CULTURE BLOOD #1   CULTURE BLOOD #2   CBC   TROPONIN   BRAIN NATRIURETIC PEPTIDE   PROTIME-INR       EKG: Normal sinus rhythm rate 85 left ventricular hypertrophy no acute ST or T-wave. All other labs were within normal range or not returnedas of this dictation.     EMERGENCYDEPARTMENT COURSE and DIFFERENTIAL DIAGNOSIS/MDM:   Vitals:    Vitals:    03/24/19 1230 03/24/19 1345 03/24/19 1355 03/24/19 1430   BP:  (!) 184/83 (!) 192/95 (!) 171/74   Pulse: 95 88 87 85   Resp:  20  27   Temp:       TempSrc:       SpO2:  97%  97%   Weight:       Height:             MDM  Number of Diagnoses or Management Options  Acute congestive heart failure, unspecified heart failure type [facility]): new, needed workup  Diagnosis management comments: Insulin-dependent diabetic presented with shortness of breath intermittent for last few days CT labs confirmed likely

## 2019-03-24 NOTE — ED TRIAGE NOTES
Pt states that she called EMS today x2 and was treated at home for asthma issues. Pt states that she is feeling fine at this time and was told to come to the er due to frequency of calling 911 and checking for underlying issue. Pt states that she miss placed her nebulizer machine and she is not able to find it to treat herself at home.

## 2019-03-24 NOTE — PROGRESS NOTES
[facility] Respiratory Therapy Evaluation   Current Order:  Q4PRN ACCUNEB      Home Regimen: PRN      Ordering Physician: DR Michael Drummond  Re-evaluation Date:  3/27     Diagnosis: SOB/CHF      Patient Status: Stable     The following MDI Criteria must be met in order to convert aerosol to MDI with spacer. If unable to meet, MDI will be converted to aerosol:  []  Patient able to demonstrate the ability to use MDI effectively  []  Patient alert and cooperative  []  Patient able to take deep breath with 5-10 second hold  []  Medication(s) available in this delivery method   []  Peak flow greater than or equal to 200 ml/min            Current Order Substituted To  (same drug, same frequency)   Aerosol to MDI [] Albuterol Sulfate 0.083% unit dose by aerosol Albuterol Sulfate MDI 2 puffs by inhalation with spacer    [] Levalbuterol 1.25 mg unit dose by aerosol Levalbuterol MDI 2 puffs by inhalation with spacer    [] Levalbuterol 0.63 mg unit dose by aerosol Levalbuterol MDI 2 puffs by inhalation with spacer    [] Ipratropium Bromide 0.02% unit dose by aerosol Ipratropium Bromide MDI 2 puffs by inhalation with spacer    [] Duoneb (Ipratropium + Albuterol) unit dose by aerosol Ipratropium MDI + Albuterol MDI 2 puffs by inhalation w/spacer   MDI to Aerosol [] Albuterol Sulfate MDI Albuterol Sulfate 0.083% unit dose by aerosol    [] Levalbuterol MDI 2 puffs by inhalation Levalbuterol 1.25 mg unit dose by aerosol    [] Ipratropium Bromide MDI by inhalation Ipratropium Bromide 0.02% unit dose by aerosol    [] Combivent (Ipratropium + Albuterol) MDI by inhalation Duoneb (Ipratropium + Albuterol) unit dose by aerosol   Treatment Assessment [Frequency/Schedule]:  Change frequency to: _________TID ACCUNEB________________per Protocol, P&T, MEC      Points 0 1 2 3 4   Pulmonary Status  Non-Smoker  []   Smoking history   < 20 pack years  []   Smoking history  ?  20 pack years  []   Pulmonary Disorder  (acute or chronic)  [x]   Severe or Chronic w/ Exacerbation  []     Surgical Status No [x]   Surgeries     General []   Surgery Lower []   Abdominal Thoracic or []   Upper Abdominal Thoracic with  PulmonaryDisorder  []     Chest X-ray Clear/Not  Ordered     []  Chronic Changes  Results Pending  []  Infiltrates, atelectasis, pleural effusion, or edema  [x]  Infiltrates in more than one lobe []  Infiltrate + Atelectasis, &/or pleural effusion  []    Respiratory Pattern Regular,  RR = 12-20 [x]  Increased,  RR = 21-25 []  MENDEZ, irregular,  or RR = 26-30 []  Decreased FEV1  or RR = 31-35 []  Severe SOB, use  of accessory muscles, or RR ? 35  []    Mental Status Alert, oriented,  Cooperative [x]  Confused but Follows commands []  Lethargic or unable to follow commands []  Obtunded  []  Comatose  []    Breath Sounds Clear to  auscultation  []  Decreased unilaterally or  in bases only []  Decreased  bilaterally  []  Crackles or intermittent wheezes [x]  Wheezes []    Cough Strong, Spontan., & nonproductive [x]  Strong,  spontaneous, &  productive []  Weak,  Nonproductive []  Weak, productive or  with wheezes []  No spontaneous  cough or may require suctioning []    Level of Activity Ambulatory []  Ambulatory w/ Assist  [x]  Non-ambulatory []  Paraplegic []  Quadriplegic []    Total    Score:___9____     Triage Score:___4_____      Tri       Triage:     1. (>20) Freq: Q3    2. (16-20) Freq: Q4   3. (11-15) Freq: QID & Albuterol Q2 PRN    4. (6-10) Freq: TID & Albuterol Q2 PRN    5. (0-5) Freq Q4prn

## 2019-03-24 NOTE — ED NOTES
Pt requested ABX be moved over to left hand IV so she can bend her right arm. Moved over with the NS.  Site flushed and intact without redness or swelling     Xiomara Prado RN  03/24/19 5025

## 2019-03-24 NOTE — ED TRIAGE NOTES
Patient was discharged from the ER 0500 this morning with diagnosis mild asthma. Patient stated she felt hot at home but unsure of current temp. Patient denies diarrhea, Patient stated she had one episode of vomiting with morning with coughing episode.

## 2019-03-24 NOTE — ED NOTES
Transport here. Pt in no acute distress at this time.  [place] ([facility]) wrap given to family member for patient per pts request.     Xiomara Prado RN  03/24/19 0920

## 2019-03-24 NOTE — CONSULTS
chloride flush 0.9 % injection 10 mL, 10 mL, Intravenous, 2 times per day  sodium chloride flush 0.9 % injection 10 mL, 10 mL, Intravenous, PRN  magnesium hydroxide (MILK OF MAGNESIA) 400 MG/5ML suspension 30 mL, 30 mL, Oral, Daily PRN  ondansetron (ZOFRAN) injection 4 mg, 4 mg, Intravenous, Q6H PRN  enoxaparin (LOVENOX) injection 40 mg, 40 mg, Subcutaneous, Daily  insulin lispro protamine & lispro (HUMALOG MIX) (75-25) 100 UNIT per ML injection vial SUSP 50 Units, 50 Units, Subcutaneous, BID WC  PARoxetine (PAXIL) tablet 10 mg, 10 mg, Oral, Daily  glucose (GLUTOSE) 40 % oral gel 15 g, 15 g, Oral, PRN  dextrose 50 % solution 12.5 g, 12.5 g, Intravenous, PRN  glucagon (rDNA) injection 1 mg, 1 mg, Intramuscular, PRN  dextrose 5 % solution, 100 mL/hr, Intravenous, PRN  albuterol (ACCUNEB) nebulizer solution 0.63 mg, 0.63 mg, Nebulization, TID  Allergies:  Darvon [propoxyphene hcl]; Ibuprofen; and Metformin and related    Social History:    TOBACCO:   reports that she has been smoking cigarettes. She started smoking about 15 months ago. She has a 15.00 pack-year smoking history.  She has never used smokeless tobacco.    Family History:       Problem Relation Age of Onset    Heart Disease Father     Arthritis Father     Arthritis Mother      REVIEW OF SYSTEMS:    All 12 systems reviewed and are negative except listed in HPI  PHYSICAL EXAM:      Vitals:    BP (!) 164/83   Pulse 89   Temp 97.9 °F (36.6 °C) (Oral)   Resp 24   Ht 4' 11\" (1.499 m)   Wt 139 lb 1.8 oz (63.1 kg)   LMP  (LMP Unknown)   SpO2 98%   BMI 28.10 kg/m²     CONSTITUTIONAL:  awake, alert, cooperative, no apparent distress, and appears stated age  EYES:  Lids and lashes normal, pupils equal, round and reactive to light, extra ocular muscles intact, sclera clear, conjunctiva normal  ENT:  Normocephalic, without obvious abnormality, atraumatic, sinuses nontender on palpation, external ears without lesions, oral pharynx with moist mucus membranes, tonsils without erythema or exudates, gums normal and good dentition. NECK:  Supple, symmetrical, trachea midline, no adenopathy, thyroid symmetric, not enlarged and no tenderness, skin normal  LUNGS:  No increased work of breathing, good air exchange, clear to auscultation bilaterally, no crackles or wheezing  CARDIOVASCULAR:  Normal apical impulse, regular rate and rhythm, normal S1 and S2, no S3 or S4, and no murmur noted  ABDOMEN:  No scars, normal bowel sounds, soft, non-distended, non-tender, no masses palpated, no hepatosplenomegally  MUSCULOSKELETAL:  There is no redness, warmth, or swelling of the joints. Full range of motion noted. Motor strength is 5 out of 5 all extremities bilaterally. Tone is normal.  NEUROLOGIC:  Awake, alert, oriented to name, place and time. Cranial nerves II-XII are grossly intact. Motor is 5 out of 5 bilaterally. Cerebellar finger to nose, heel to shin intact. Sensory is intact. Babinski down going, Romberg negative, and gait is normal.  SKIN:  no bruising or bleeding, normal skin color, texture, turgor, no redness, warmth, or swelling, no rashes and no lesions  DATA:    LABS  Recent Labs     03/24/19  1145   WBC 7.6   RBC 4.70   HGB 14.3   HCT 41.8   MCV 89.1   MCH 30.3   MCHC 34.1   RDW 13.5          Recent Labs     03/24/19  1145 03/25/19  0538    142   K 3.9 4.0    104   CO2 26 23   BUN 10 19   CREATININE 0.59 0.69   GLUCOSE 203* 327*   CALCIUM 9.5 9.0       Recent Labs     03/25/19  0538   MG 1.6*         IMPRESSION/RECOMMENDATIONS:      - asthma: Continue Duoneb and albuterol. Influenza swab negative.    - DM: Continue home meds    Carlos A Andersen MD  Pager : [phone]

## 2019-03-24 NOTE — H&P
History and Physical  Patient: Charmayne Sines  Unit/Bed: 22/22  YOB: 1945  MRN: [id number]  Acct: [de-identified]   Admitting Diagnosis: Acute combined systolic and diastolic CHF, NYHA class 1 ([facility] [I50.41]  Admit Date:  3/24/2019  Hospital Day: 0      Chief Complaint: dyspnea    History of Present Illness: 75year old female with three ER visits today for shortness of breath. First couple of interactions was given breathing treatment and sent home. Now she is hypertensive BP 200s and still very short of breath. CT chest done and no PE but did see interstitial edema consistent with CHF. BNP elevated. Patient reports recurrent episodes of shortness of breath. Denies chest pain or angina. No claudication. PMHx:  Past Medical History:   Diagnosis Date    Anxiety     Asthma     Chronic back pain     Bilateral L5 S1 Radic on emg--surprisingly worse on the left than the right--pt's symptoms and her MRI show worse on the right    Depression     Fibromyalgia     Hyperlipidemia     Hypertension     Osteoarthritis     Type II diabetes mellitus, uncontrolled ([facility]     Unspecified sleep apnea        PSHx:  Past Surgical History:   Procedure Laterality Date    BACK SURGERY      CARDIAC CATHETERIZATION  11/3/14     DR. MIRELES     COLONOSCOPY  08/29/2016    w/polypectomy     EYE SURGERY      IN ESOPHAGOGASTRODUODENOSCOPY TRANSORAL DIAGNOSTIC N/A 3/24/2017    EGD ESOPHAGOGASTRODUODENOSCOPY performed by Anjali Alatorre MD at [facility] N/A 2/8/2018    negative findings    IN REVISE MEDIAN N/CARPAL TUNNEL SURG Left 6/5/2017    LEFT  CARPAL TUNNEL RELEASE [facility]illan [facility] N/A 2/8/2018    TONSILLECTOMY      UPPER GASTROINTESTINAL ENDOSCOPY  08/26/2016    w/bx        Social Hx:  Social History     Socioeconomic History    Marital status:      Spouse name: None    Number of children: None    Years of education: None    Highest education level: None   Occupational History    Occupation: Retired-   Social Needs    Financial resource strain: None    Food insecurity:     Worry: None     Inability: None    Transportation needs:     Medical: None     Non-medical: None   Tobacco Use    Smoking status: Heavy Tobacco Smoker     Packs/day: 1.00     Years: 15.00     Pack years: 15.00     Types: Cigarettes     Start date: 11/30/2017    Smokeless tobacco: Never Used    Tobacco comment: smokes about 4 cigarettes a day   Substance and Sexual Activity    Alcohol use: No    Drug use: No    Sexual activity: Yes   Lifestyle    Physical activity:     Days per week: None     Minutes per session: None    Stress: None   Relationships    Social connections:     Talks on phone: None     Gets together: None     Attends Church service: None     Active member of club or organization: None     Attends meetings of clubs or organizations: None     Relationship status: None    Intimate partner violence:     Fear of current or ex partner: None     Emotionally abused: None     Physically abused: None     Forced sexual activity: None   Other Topics Concern    None   Social History Narrative    None       Family Hx:  Family History   Problem Relation Age of Onset    Heart Disease Father     Arthritis Father     Arthritis Mother        Review of Systems:   Review of Systems   Constitutional: Positive for fatigue. Negative for activity change and appetite change. HENT: Negative for congestion. Respiratory: Positive for cough and shortness of breath. Negative for apnea, choking and chest tightness. Cardiovascular: Negative for chest pain. Gastrointestinal: Negative for abdominal distention and abdominal pain. Endocrine: Negative for cold intolerance and heat intolerance. Genitourinary: Negative for dysuria and enuresis.    Musculoskeletal: Negative for arthralgias and back pain. Skin: Negative for color change. Neurological: Negative for dizziness, seizures, syncope and light-headedness. Disorientation   Psychiatric/Behavioral: Negative for agitation, behavioral problems and confusion. Allergies: Allergies   Allergen Reactions    Darvon [Propoxyphene Hcl]     Ibuprofen Nausea Only    Metformin And Related      Diarrhea         Medications:  Current Facility-Administered Medications   Medication Dose Route Frequency Provider Last Rate Last Dose    0.9 % sodium chloride infusion   Intravenous Continuous Daily Brown PA-C 100 mL/hr at 03/24/19 1207      ipratropium-albuterol (DUONEB) nebulizer solution 1 ampule  1 ampule Inhalation Q4H WA Daily Brown PA-C   1 ampule at 03/24/19 1154     Current Outpatient Medications   Medication Sig Dispense Refill    methylPREDNISolone (MEDROL, ALMA,) 4 MG tablet Take by mouth. 1 kit 0    albuterol sulfate HFA (PROVENTIL HFA) 108 (90 Base) MCG/ACT inhaler Inhale 2 puffs into the lungs every 6 hours as needed for Wheezing 1 Inhaler 3    misoprostol (CYTOTEC) 200 MCG tablet Take 1 tablet by mouth every 12 hours The day prior to procedure 2 tablet 0    HYDROcodone-acetaminophen (NORCO) 5-325 MG per tablet Take 1 tablet by mouth every 8 hours as needed for Pain (max of 3 per day) for up to 30 days.  Max of 80 per month 80 tablet 0    ezetimibe (ZETIA) 10 MG tablet Take 1 tablet by mouth daily 30 tablet 1    PARoxetine (PAXIL) 40 MG tablet TAKE 1 TABLET BY MOUTH ONCE DAILY IN THE MORNING 30 tablet 1    amLODIPine (NORVASC) 5 MG tablet TAKE 1 TABLET BY MOUTH ONCE DAILY 30 tablet 1    omeprazole (PRILOSEC) 20 MG delayed release capsule Take 1 capsule by mouth 2 times daily 60 capsule 1    metoprolol succinate (TOPROL XL) 200 MG extended release tablet 2 po q day 60 tablet 1    colesevelam (WELCHOL) 3.75 g PACK powder Take 1 packet by mouth daily 30 each 1    rosuvastatin (CRESTOR) 40 MG tablet Take 1 7.6 03/24/2019    RBC 4.70 03/24/2019    HGB 14.3 03/24/2019    HCT 41.8 03/24/2019     03/24/2019    MCV 89.1 03/24/2019    MCH 30.3 03/24/2019    MCHC 34.1 03/24/2019    RDW 13.5 03/24/2019    LYMPHOPCT 31.9 03/20/2019    MONOPCT 7.0 03/20/2019    BASOPCT 0.7 03/20/2019    MONOSABS 0.4 03/20/2019    LYMPHSABS 2.0 03/20/2019    EOSABS 0.4 03/20/2019    BASOSABS 0.0 03/20/2019     CMP:    Lab Results   Component Value Date     03/24/2019    K 3.9 03/24/2019     03/24/2019    CO2 26 03/24/2019    BUN 10 03/24/2019    CREATININE 0.59 03/24/2019    GFRAA >60.0 03/24/2019    LABGLOM >60.0 03/24/2019    GLUCOSE 203 03/24/2019    PROT 6.8 03/24/2019    LABALBU 3.9 03/24/2019    CALCIUM 9.5 03/24/2019    BILITOT 0.3 03/24/2019    ALKPHOS 124 03/24/2019    AST 40 03/24/2019    ALT 38 03/24/2019     BMP:    Lab Results   Component Value Date     03/24/2019    K 3.9 03/24/2019     03/24/2019    CO2 26 03/24/2019    BUN 10 03/24/2019    LABALBU 3.9 03/24/2019    CREATININE 0.59 03/24/2019    CALCIUM 9.5 03/24/2019    GFRAA >60.0 03/24/2019    LABGLOM >60.0 03/24/2019    GLUCOSE 203 03/24/2019     Magnesium:    Lab Results   Component Value Date    MG 1.8 12/21/2017     Troponin:    Lab Results   Component Value Date    TROPONINI <0.010 03/24/2019       EKG: EKG: normal sinus rhythm LVH with repol abnormality. Assessment/Plan:    Active Hospital Problems    Diagnosis Date Noted    Acute combined systolic and diastolic CHF, NYHA class 1 ([facility] [I50.41] 03/24/2019           Acute diastolic CHF class IV, hypertensive emergency. Known normal LVEF and severe LVH. Control BP, diuresis and possible discharge in AM.      Electronically signed by Guille Florian.  Becki Candelario MD [facility] Director of Cardiology Services and Cardiac Catheterization Laboratory  [facility], [place]   on 3/24/2019 at 3:45 PM

## 2019-03-24 NOTE — ED NOTES
Patient up to bedside commode. Jay Horan is in commode to measure urine output.       Jackie Clinton  03/24/19 0999

## 2019-03-24 NOTE — ED PROVIDER NOTES
[facility] ED  eMERGENCY dEPARTMENT eNCOUnter      Pt Name: Tianna Williamson  MRN: [id number]  [place] 1945  Date of evaluation: 3/24/2019  Provider: Malini Mishra MD    CHIEF COMPLAINT       Chief Complaint   Patient presents with    Asthma     called EMS x2 today isnot having symptoms now         HISTORY OF PRESENT ILLNESS   (Location/Symptom, Timing/Onset,Context/Setting, Quality, Duration, Modifying Factors, Severity)  Note limiting factors. Tianna Williamson is a 75 y.o. female who presents to the emergency department  having not been able to find her nebulizer machine because it is in the process of being removed. She had some wheezing with moving, EMS gave her breathing treatment with complete relief of symptoms, no chest pain, noticed productive cough, no weakness, no numbness, no smoking. She is a smoker. HPI    NursingNotes were reviewed. REVIEW OF SYSTEMS    (2-9 systems for level 4, 10 or more for level 5)     Review of Systems     Constitutional symptoms:  no Fatigue, no fever, no chills. Skin symptoms:  Negative except as documented in HPI. ENMT symptoms:  Negative except as documented in HPI. Respiratory symptoms:  Negative except as documented in HPI. Wheezing, resolved with nebulizer given by EMS  Cardiovascular symptoms:  Negative except as documented in HPI. Gastrointestinal symptoms: Negative except for documented as above in the HPI   Genitourinary symptoms:  Negative except as documented in HPI. Musculoskeletal symptoms:  Negative except as documented in HPI. Neurologic symptoms:  Negative except as documented in HPI.    Remainder of 10 systems, all negative except for mentioned above        PAST MEDICAL HISTORY     Past Medical History:   Diagnosis Date    Anxiety     Asthma     Chronic back pain     Bilateral L5 S1 Radic on emg--surprisingly worse on the left than the right--pt's symptoms and her MRI show worse on the right    Depression     Fibromyalgia     Hyperlipidemia     Hypertension     Osteoarthritis     Type II diabetes mellitus, uncontrolled ([facility]     Unspecified sleep apnea          SURGICALHISTORY       Past Surgical History:   Procedure Laterality Date    BACK SURGERY      CARDIAC CATHETERIZATION  11/3/14     DR. MIRELES     COLONOSCOPY  08/29/2016    w/polypectomy     EYE SURGERY      [place] ESOPHAGOGASTRODUODENOSCOPY TRANSORAL DIAGNOSTIC N/A 3/24/2017    EGD ESOPHAGOGASTRODUODENOSCOPY performed [facility]ja [facility] at [facility] N/A 2/8/2018    negative findings    OH REVISE MEDIAN N/CARPAL TUNNEL SURG Left 6/5/2017    LEFT  CARPAL TUNNEL RELEASE performed by Nini Roca [facility] at [facility]/S,BGO[facility] N/A 2/8/2018    TONSILLECTOMY      UPPER GASTROINTESTINAL ENDOSCOPY  08/26/2016    w/bx          CURRENT MEDICATIONS       Previous Medications    AMLODIPINE (NORVASC) 5 MG TABLET    TAKE 1 TABLET BY MOUTH ONCE DAILY    COLESEVELAM (WELCHOL) 3.75 G PACK POWDER    Take 1 packet by mouth daily    EZETIMIBE (ZETIA) 10 MG TABLET    Take 1 tablet by mouth daily    HYDROCODONE-ACETAMINOPHEN (NORCO) 5-325 MG PER TABLET    Take 1 tablet by mouth every 8 hours as needed for Pain (max of 3 per day) for up to 30 days.  Max of 80 per month    INSULIN 70-30 (HUMULIN 70/30) (70-30) 100 UNIT PER ML INJECTION VIAL    Inject 60 units bid please give 4 vials for a 30 day supply    METOPROLOL SUCCINATE (TOPROL XL) 200 MG EXTENDED RELEASE TABLET    2 po q day    MISOPROSTOL (CYTOTEC) 200 MCG TABLET    Take 1 tablet by mouth every 12 hours The day prior to procedure    NALOXONE (NARCAN) 4 MG/0.1ML LIQD NASAL SPRAY    1 spray by Nasal route as needed for Opioid Reversal    OMEPRAZOLE (PRILOSEC) 20 MG DELAYED RELEASE CAPSULE    Take 1 capsule by mouth 2 times daily    PAROXETINE (PAXIL) 40 MG TABLET    TAKE 1 TABLET BY MOUTH ONCE DAILY IN THE MORNING    PREGABALIN (LYRICA) 25 MG CAPSULE    Take 1 capsule by mouth 3 times daily for 90 days. Allayne Jessica ROSUVASTATIN (CRESTOR) 40 MG TABLET    Take 1 tablet by mouth every evening       ALLERGIES     Darvon [propoxyphene hcl];  Ibuprofen; and Metformin and related    FAMILY HISTORY       Family History   Problem Relation Age of Onset    Heart Disease Father     Arthritis Father     Arthritis Mother           SOCIAL HISTORY       Social History     Socioeconomic History    Marital status:      Spouse name: None    Number of children: None    Years of education: None    Highest education level: None   Occupational History    Occupation: Retired-   Social Needs    Financial resource strain: None    Food insecurity:     Worry: None     Inability: None    Transportation needs:     Medical: None     Non-medical: None   Tobacco Use    Smoking status: Light Tobacco Smoker     Packs/day: 0.15     Years: 15.00     Pack years: 2.25     Types: Cigarettes     Start date: 11/30/2017    Smokeless tobacco: Never Used    Tobacco comment: smokes about 4 cigarettes a day   Substance and Sexual Activity    Alcohol use: No    Drug use: No    Sexual activity: Yes   Lifestyle    Physical activity:     Days per week: None     Minutes per session: None    Stress: None   Relationships    Social connections:     Talks on phone: None     Gets together: None     Attends Orthodoxy service: None     Active member of club or organization: None     Attends meetings of clubs or organizations: None     Relationship status: None    Intimate partner violence:     Fear of current or ex partner: None     Emotionally abused: None     Physically abused: None     Forced sexual activity: None   Other Topics Concern    None   Social History Narrative    None       SCREENINGS      @FLOW([id number])@      PHYSICAL EXAM    (up to 7 for level 4, 8 or more for level 5)     ED Triage Vitals [03/24/19 0423]   BP Temp Temp Source Pulse Resp SpO2 available at the time ofthis note:    No orders to display         ED BEDSIDE ULTRASOUND:   Performed by ED Physician - none    LABS:  Labs Reviewed - No data to display    All other labs were within normal range or not returned as of this dictation. EMERGENCY DEPARTMENT COURSE and DIFFERENTIAL DIAGNOSIS/MDM:   Vitals:    Vitals:    03/24/19 0423   BP: (!) 190/77   Pulse: 91   Resp: 20   Temp: 97.8 °F (36.6 °C)   TempSrc: Oral   SpO2: 97%   Weight: 132 lb (59.9 kg)   Height: 4' 11\" (1.499 m)           MDM    CRITICAL CARE TIME   Total Critical Care time was  minutes, excluding separately reportableprocedures. There was a high probability of clinicallysignificant/life threatening deterioration in the patient's condition which required my urgent intervention. CONSULTS:  None    PROCEDURES:  Unless otherwise noted below, none     Procedures    FINAL IMPRESSION      1. Mild intermittent asthma with exacerbation          DISPOSITION/PLAN   DISPOSITION Decision To Discharge 03/24/2019 04:43:55 AM      PATIENT REFERRED TO:  Deepali Rosenthal MD  [id number] [phone]    In 2 days  Return for weakening, worsening, persistent fevers      DISCHARGE MEDICATIONS:  New Prescriptions    ALBUTEROL (PROVENTIL) (5 MG/ML) 0.5% NEBULIZER SOLUTION    Take 0.5 mLs by nebulization every 6 hours as needed for Wheezing    ALBUTEROL SULFATE HFA (PROVENTIL HFA) 108 (90 BASE) MCG/ACT INHALER    Inhale 2 puffs into the lungs every 6 hours as needed for Wheezing    METHYLPREDNISOLONE (MEDROL, ALMA,) 4 MG TABLET    Take by mouth.           (Please note that portions of this note were completed with a voice recognition program.  Efforts were made to edit the dictations but occasionally words are mis-transcribed.)    Annalee Oconnell MD (electronically signed)  Attending Emergency Physician          Annalee Oconnell MD  03/24/19 0660       Annalee Oconnell MD  03/24/19 9834

## 2019-03-25 VITALS
RESPIRATION RATE: 18 BRPM | OXYGEN SATURATION: 100 % | BODY MASS INDEX: 28.1 KG/M2 | WEIGHT: 139.4 LBS | SYSTOLIC BLOOD PRESSURE: 156 MMHG | TEMPERATURE: 98.2 F | HEART RATE: 85 BPM | DIASTOLIC BLOOD PRESSURE: 68 MMHG | HEIGHT: 59 IN

## 2019-03-25 LAB
ANION GAP SERPL CALCULATED.3IONS-SCNC: 15 MEQ/L (ref 9–15)
BUN BLDV-MCNC: 19 MG/DL (ref 8–23)
CALCIUM SERPL-MCNC: 9 MG/DL (ref 8.5–9.9)
CHLORIDE BLD-SCNC: 104 MEQ/L (ref 95–107)
CHOLESTEROL, TOTAL: 121 MG/DL (ref 0–199)
CO2: 23 MEQ/L (ref 20–31)
CREAT SERPL-MCNC: 0.69 MG/DL (ref 0.5–0.9)
GFR AFRICAN AMERICAN: >60
GFR NON-AFRICAN AMERICAN: >60
GLUCOSE BLD-MCNC: 269 MG/DL (ref 60–115)
GLUCOSE BLD-MCNC: 327 MG/DL (ref 70–99)
GLUCOSE BLD-MCNC: 353 MG/DL (ref 60–115)
HDLC SERPL-MCNC: 49 MG/DL (ref 40–59)
LDL CHOLESTEROL CALCULATED: 59 MG/DL (ref 0–129)
MAGNESIUM: 1.6 MG/DL (ref 1.7–2.4)
PERFORMED ON: ABNORMAL
PERFORMED ON: ABNORMAL
POTASSIUM SERPL-SCNC: 4 MEQ/L (ref 3.4–4.9)
SODIUM BLD-SCNC: 142 MEQ/L (ref 135–144)
TRIGL SERPL-MCNC: 63 MG/DL (ref 0–150)

## 2019-03-25 PROCEDURE — 6360000002 HC RX W HCPCS: Performed by: INTERNAL MEDICINE

## 2019-03-25 PROCEDURE — 2700000000 HC OXYGEN THERAPY PER DAY

## 2019-03-25 PROCEDURE — 96366 THER/PROPH/DIAG IV INF ADDON: CPT

## 2019-03-25 PROCEDURE — 96367 TX/PROPH/DG ADDL SEQ IV INF: CPT

## 2019-03-25 PROCEDURE — 80061 LIPID PANEL: CPT

## 2019-03-25 PROCEDURE — 99238 HOSP IP/OBS DSCHRG MGMT 30/<: CPT | Performed by: INTERNAL MEDICINE

## 2019-03-25 PROCEDURE — 80048 BASIC METABOLIC PNL TOTAL CA: CPT

## 2019-03-25 PROCEDURE — 94640 AIRWAY INHALATION TREATMENT: CPT

## 2019-03-25 PROCEDURE — 6370000000 HC RX 637 (ALT 250 FOR IP): Performed by: INTERNAL MEDICINE

## 2019-03-25 PROCEDURE — 36415 COLL VENOUS BLD VENIPUNCTURE: CPT

## 2019-03-25 PROCEDURE — 2580000003 HC RX 258: Performed by: INTERNAL MEDICINE

## 2019-03-25 PROCEDURE — 96372 THER/PROPH/DIAG INJ SC/IM: CPT

## 2019-03-25 PROCEDURE — 83735 ASSAY OF MAGNESIUM: CPT

## 2019-03-25 RX ORDER — FUROSEMIDE 40 MG/1
40 TABLET ORAL DAILY
Qty: 60 TABLET | Refills: 3 | Status: SHIPPED | OUTPATIENT
Start: 2019-03-25 | End: 2019-09-30

## 2019-03-25 RX ORDER — ASPIRIN 81 MG/1
81 TABLET ORAL DAILY
Status: DISCONTINUED | OUTPATIENT
Start: 2019-03-25 | End: 2019-03-25 | Stop reason: HOSPADM

## 2019-03-25 RX ORDER — FUROSEMIDE 40 MG/1
40 TABLET ORAL DAILY
Status: DISCONTINUED | OUTPATIENT
Start: 2019-03-25 | End: 2019-03-25 | Stop reason: HOSPADM

## 2019-03-25 RX ORDER — ASPIRIN 81 MG/1
81 TABLET ORAL DAILY
Qty: 30 TABLET | Refills: 3 | Status: ON HOLD | OUTPATIENT
Start: 2019-03-25 | End: 2020-02-24

## 2019-03-25 RX ORDER — LISINOPRIL 10 MG/1
10 TABLET ORAL 2 TIMES DAILY
Status: DISCONTINUED | OUTPATIENT
Start: 2019-03-25 | End: 2019-03-25 | Stop reason: HOSPADM

## 2019-03-25 RX ORDER — MAGNESIUM SULFATE IN WATER 40 MG/ML
4 INJECTION, SOLUTION INTRAVENOUS ONCE
Status: COMPLETED | OUTPATIENT
Start: 2019-03-25 | End: 2019-03-25

## 2019-03-25 RX ORDER — LISINOPRIL 10 MG/1
10 TABLET ORAL 2 TIMES DAILY
Qty: 30 TABLET | Refills: 3 | Status: SHIPPED | OUTPATIENT
Start: 2019-03-25 | End: 2019-04-02 | Stop reason: SDUPTHER

## 2019-03-25 RX ADMIN — FUROSEMIDE 40 MG: 40 TABLET ORAL at 08:22

## 2019-03-25 RX ADMIN — ENOXAPARIN SODIUM 40 MG: 40 INJECTION SUBCUTANEOUS at 08:22

## 2019-03-25 RX ADMIN — PAROXETINE 10 MG: 10 TABLET, FILM COATED ORAL at 08:22

## 2019-03-25 RX ADMIN — ALBUTEROL SULFATE 0.63 MG: 0.63 SOLUTION INTRABRONCHIAL at 06:47

## 2019-03-25 RX ADMIN — ALBUTEROL SULFATE 0.63 MG: 0.63 SOLUTION INTRABRONCHIAL at 13:13

## 2019-03-25 RX ADMIN — LISINOPRIL 10 MG: 10 TABLET ORAL at 08:22

## 2019-03-25 RX ADMIN — ASPIRIN 81 MG: 81 TABLET ORAL at 08:22

## 2019-03-25 RX ADMIN — Medication 10 ML: at 08:22

## 2019-03-25 RX ADMIN — INSULIN LISPRO 50 UNITS: 100 INJECTION, SUSPENSION SUBCUTANEOUS at 07:50

## 2019-03-25 RX ADMIN — MAGNESIUM SULFATE HEPTAHYDRATE 4 G: 40 INJECTION, SOLUTION INTRAVENOUS at 08:22

## 2019-03-25 ASSESSMENT — PAIN SCALES - GENERAL: PAINLEVEL_OUTOF10: 0

## 2019-03-25 NOTE — DISCHARGE INSTR - DIET
 Good nutrition is important when healing from an illness, injury, or surgery. Follow any nutrition recommendations given to you during your hospital stay.  If you were given an oral nutrition supplement while in the hospital, continue to take this supplement at home. You can take it with meals, in-between meals, and/or before bedtime. These supplements can be purchased at most local grocery stores, pharmacies, and chain super-stores.  If you have any questions about your diet or nutrition, call the hospital and ask for the dietitian. Limiting Sodium and Fluids With Heart Failure: Care Instructions  Your Care Instructions    Sodium causes your body to hold on to extra water. This may cause your heart failure symptoms to get worse. Limiting sodium may help you feel better and lower your risk of having to go to the hospital.  People get most of their sodium from processed foods. Fast food and restaurant meals also tend to be very high in sodium. Your doctor may suggest that you limit sodium to 2,000 milligrams (mg) a day or less. That is less than 1 teaspoon of salt a day, including all the salt you eat in cooked or packaged foods. Usually, you have to limit the amount of liquids you drink only if your heart failure is severe. Limiting sodium alone often is enough to help your body get rid of extra fluids. However, your doctor may tell you to limit your fluid intake to a set amount each day. Follow-up care is a key part of your treatment and safety. Be sure to make and go to all appointments, and call your doctor if you are having problems. It's also a good idea to know your test results and keep a list of the medicines you take. How can you care for yourself at home? Read food labels  · Read food labels on cans and food packages. The labels tell you how much sodium is in each serving. Make sure that you look at the serving size.  If you eat more than the serving size, you have eaten more sodium than is listed for one serving. · Food labels also tell you the Percent Daily Value. If the Percent Daily Value says 50%, it means that you will get at least 50% of all the sodium you need for the entire day in one serving. Choose products with low Percent Daily Values for sodium. · Be aware that sodium can come in forms other than salt, including monosodium glutamate (MSG), sodium citrate, and sodium bicarbonate (baking soda). MSG is often added to Asian food. You can sometimes ask for food without MSG or salt. Buy low-sodium foods  · Buy foods that are labeled \"unsalted\" (no salt added), \"sodium-free\" (less than 5 mg of sodium per serving), or \"low-sodium\" (less than 140 mg of sodium per serving). A food labeled \"light sodium\" has less than half of the full-sodium version of that food. Foods labeled \"reduced-sodium\" may still have too much sodium. · Buy fresh vegetables or plain, frozen vegetables. Buy low-sodium versions of canned vegetables, soups, and other canned goods. Prepare low-sodium meals  · Use less salt each day when cooking. Reducing salt in this way will help you adjust to the taste. Do not add salt after cooking. Take the salt shaker off the table. · Flavor your food with garlic, lemon juice, onion, vinegar, herbs, and spices instead of salt. Do not use soy sauce, steak sauce, onion salt, garlic salt, mustard, or ketchup on your food. · Make your own salad dressings, sauces, and ketchup without adding salt. · Use less salt (or none) when recipes call for it. You can often use half the salt a recipe calls for without losing flavor. Other dishes like rice, pasta, and grains do not need added salt. · Rinse canned vegetables. This removes some--but not all--of the salt. · Avoid water that has a naturally high sodium content or that has been treated with water softeners, which add sodium. Call your local water company to find out the sodium content of your water supply.  If you buy bottled water, read the label and choose a sodium-free brand. Avoid high-sodium foods, such as:  · Smoked, cured, salted, and canned meat, fish, and poultry. · Ham, mcclellan, hot dogs, and luncheon meats. · Regular, hard, and processed cheese and regular peanut butter. · Crackers with salted tops. · Frozen prepared meals. · Canned and dried soups, broths, and bouillon, unless labeled sodium-free or low-sodium. · Canned vegetables, unless labeled sodium-free or low-sodium. · Salted snack foods such as chips and pretzels. · [employer] fries, pizza, tacos, and other fast foods. · Pickles, olives, ketchup, and other condiments, especially soy sauce, unless labeled sodium-free or low-sodium. If you cannot cook for yourself  · Have family members or friends help you, or have someone cook low-sodium meals. · Check with your local senior nutrition program to find out where meals are served and whether they offer a low-sodium option. You can often find these programs through your local health department or hospital.  · Have meals delivered to your home. Most [facility] have a Meals on [employer]. These programs provide one hot meal a day for older adults, delivered to their homes. Ask whether these meals are low-sodium. Let them know that you are on a low-sodium diet. Limiting fluid intake  · Find a method that works for you. You might simply write down how much you drink every time you do. Some people keep a container filled with the amount of fluid allowed for that day. If they drink from a source other than the container, then they pour out that amount. · Measure your regular drinking glasses to find out how much fluid each one holds. Once you know this, you will not have to measure every time. · Besides water, milk, juices, and other drinks, some foods have a lot of fluid. Count any foods that will melt (such as ice cream or gelatin dessert) or liquid foods (such as soup) as part of your fluid intake for the day.   Where can you learn more? Go to https://chpepiceweb.healthUnda. org and sign in to your [employer] account. Enter A166 in the [facility] box to learn more about \"Limiting Sodium and Fluids With Heart Failure: Care Instructions. \"     If you do not have an account, please click on the \"Sign Up Now\" link. Current as of: July 22, 2018  Content Version: 11.9  © 8861-6346 [employer]. Care instructions adapted under license by [facility] ([facility]). If you have questions about a medical condition or this instruction, always ask your healthcare professional. [address] any warranty or liability for your use of this information.

## 2019-03-25 NOTE — CARE COORDINATION
Care Transition Nurse provided CHF Booklet and zone sheet and reviewed at length. Pt states this is a new diagnosis. Discussed heart failure anatomy and symptom management. Stressed importance of phoning physician promptly for symptoms in the yellow zones and EMS for symptoms in the red zones. Discussed importance of weighing self daily and reporting weight gain of 3 or more pounds in 1-7 days, edema, sob, and fatigue. Pt owns a scale, and is agreeable to weigh self daily and log weights. We discussed the importance of daily weights as an early indicator that she is going into heart failure. I stressed the importance of calling physician with weight gain of 3 pounds or more as this could prevent hospitalization. I informed her that many times when you contact physician, simple medication adjustment can treat early symptoms of heart failure and prevent her from needing to seek emergent medical attention. This also gives her more control over her disease management. Reviewed limiting sodium intake. Pt cooks most of the meals at home and states she adds very little salt. She states her spouse also has HTN, so they have learned to limit sodium intake. Pt states she rinses canned foods, and avoids frozen foods. Referred pt to nutrition section of CHF booklet for review of healthier alternatives to diet. Pt has history of diabetes and HTN, so she states monitoring diet has been something she has always paid attention to. Pt states she records blood pressure and blood glucose levels daily. She states her blood pressure tends to run a little on the high side sometimes due to chronic pain issues. She see Dr. Vandana Benjamin for pain management. Pt is a smoker. dicussed smoking cessation and I referred pt to free smoking cessation programs available to her if she choose to quit smoking.  Pt states she smokes about 5-6 cigarettes a day, but does realize the harm that does to her health, she is considering trying the Nicoderm patches to assist her with quitting. Advised of the dangers of using nicotine patches, if she continues to smoke. Pt states,\" I know I need to start taking better care of myself\". Pt reports she follow closely with PCP and is scheduled for appointment next week. She states stress level is managed and she enjoys spending time with family. Pt reports she takes her medications as ordered. I reinforced need to follow with PCP and/or Cardiologist within 7 days of discharge. I informed her appointment would be made at time of discharge, but if for some reason they were not, he should contact physician directly or myself for assistance with that. I left my contact information and requested she contact me with any questions of concerns.

## 2019-03-25 NOTE — PROGRESS NOTES
OBJECTIVE:  Vital Signs:  Vitals:    03/25/19 0647   BP:    Pulse:    Resp: 18   Temp:    SpO2: 100%       Focal exam:      General Exam (except as mentioned above):  CONSTITUTIONAL: Awake, alert, no apparent distress  EYES:  PERRL, conjunctiva normal  ENT:  Normocephalic, atraumatic  NECK:  Supple  BACK:  Symmetric  LUNGS:  CTAB except bilateral basilar crackles. CARDIOVASCULAR:  S1S2 present  ABDOMEN:  soft, non-distended, non-tender  MUSCULOSKELETAL:  There is no redness, warmth, or swelling of the joints. NEUROLOGIC:  Alert, awake, oriented x 3. No FND  EXTREMITIES: Warm and well perfused. LABS  Recent Labs     03/24/19  1145   WBC 7.6   RBC 4.70   HGB 14.3   HCT 41.8   MCV 89.1   MCH 30.3   MCHC 34.1   RDW 13.5          Recent Labs     03/24/19  1145 03/25/19  0538    142   K 3.9 4.0    104   CO2 26 23   BUN 10 19   CREATININE 0.59 0.69   GLUCOSE 203* 327*   CALCIUM 9.5 9.0       Recent Labs     03/25/19  0538   MG 1.6*           ASSESSMENT AND PLAN    There are no active hospital problems to display for this patient.     - DM  - Asthama  Continue home meds    DVT prophylaxis: Lovekarunax  Audelia Bucio MD  Pager : [phone]

## 2019-03-25 NOTE — DISCHARGE INSTR - ACTIVITY
Medicines to Avoid With Heart Failure: Care Instructions  Your Care Instructions    Your doctor gave you medicines to help treat your heart failure. But did you know that many other medicines can make heart failure worse? Even medicines and herbs that you buy over the counter (OTC) can harm you. Be sure your doctor knows all of the OTC and prescription drugs you take. And don't start to take any medicine unless your doctor says it's okay. Follow-up care is a key part of your treatment and safety. Be sure to make and go to all appointments, and call your doctor if you are having problems. It's also a good idea to know your test results and keep a list of the medicines you take. How can you care for yourself at home? Over-the-counter drugs  · Before you take any over-the-counter drug, ask your doctor or pharmacist if it is safe. This includes herbs and vitamins. Medicines to avoid include:  ? Pain relievers called NSAIDs. These include ibuprofen and naproxen. Use acetaminophen instead. For example, you can take Tylenol for pain or fever. ? Low-dose aspirin. If your doctor has told you to take aspirin every day for your heart, follow his or her instructions on how much to take. Do not take aspirin for pain. ? Antacids or laxatives. Do not take ones that have sodium in them. These include [place]. ? Cold, cough, flu, or sinus medicines. Read the label. Do not take ones that have pseudoephedrine, ephedrine, phenylephrine, or oxymetazoline in them. And make sure they don't have aspirin or ibuprofen in them. Watch for all of these in allergy medicines, nose sprays, and herbal products too.  ? Supplements and vitamins. These include black cohosh, Crowell's wort, and vitamin E.  Prescription drugs  · Each time you see a doctor, make sure he or she knows that you take drugs for heart failure. Before you fill any new prescription, ask the pharmacist if it's okay to take the new drug.  Drugs that can make heart failure worse include:  ? Calcium channel blockers. These include nifedipine. If you need to take this type of drug for another health problem, your doctor will closely watch your health. ? Heart rhythm drugs. These include disopyramide and flecainide. These can treat a fast or uneven heart rhythm. ? Prescription NSAIDs. These include celecoxib (Celebrex) and diclofenac.  ? Certain medicines for diabetes. These include pioglitazone and rosiglitazone. Where can you learn more? Go to [email or web address] org and sign in to your [employer] account. Enter C613 in the AdYouNet box to learn more about \"Medicines to Avoid With Heart Failure: Care Instructions. \"     If you do not have an account, please click on the \"Sign Up Now\" link. Current as of: July 22, 2018  Content Version: 11.9  © 8696-2008 [employer]. Care instructions adapted under license by [facility] ([facility]). If you have questions about a medical condition or this instruction, always ask your healthcare professional. [address] any warranty or liability for your use of this information.

## 2019-03-25 NOTE — DISCHARGE SUMMARY
4 vial, Refills: 3      albuterol (PROVENTIL) (5 MG/ML) 0.5% nebulizer solution Take 0.5 mLs by nebulization every 6 hours as needed for Wheezing  Qty: 120 each, Refills: 0      naloxone (NARCAN) 4 MG/0.1ML LIQD nasal spray 1 spray by Nasal route as needed for Opioid Reversal  Qty: 1 each, Refills: 2    Associated Diagnoses: High risk medication use      pregabalin (LYRICA) 25 MG capsule Take 1 capsule by mouth 3 times daily for 90 days. Priscella Palin: 270 capsule, Refills: 0    Associated Diagnoses: Myalgia; Diabetic radiculopathy ([facility] [address] Intercostal neuropathy; High risk medication use; Anxiety         STOP taking these medications       amLODIPine (NORVASC) 5 MG tablet Comments:   Reason for Stopping:               Significant Diagnostics:   Radiology: Cta Chest W Wo Contrast    Result Date: 3/24/2019  EXAMINATION: CTA CHEST W WO CONTRAST  DATE AND TIME:3/24/2019 12:45 PM CLINICAL HISTORY: Acute chest pain. Shortness of breath  SHORTNESS OF BREATH PRODUCED BY EXERTION OR STRESS  COMPARISON: None available. TECHNIQUE: Helical axial CTA of the chest was performed following the intravenous administration of 100 mL Optiray 320 intravenous contrast.  2D images were reconstructed in the axial and coronal planes. 3-D MIP images were generated in the coronal plane. Images were reviewed on the PACS workstation. All images including the 3D MIPS were permanently archived. All CT scans at this facility use dose modulation, iterative reconstruction, and/or weight based dosing when appropriate to reduce radiation dose to as low as reasonably achievable. FINDINGS:  Embolus: There is good opacification of the main, lobar, segmental and proximal subsegmental pulmonary artery branches. No sign of pulmonary embolus in the central pulmonary arterial tree. No evidence of thoracic aortic aneurysm or dissection. Lungs: Small right effusion with minimal right base atelectasis/infiltrate or dependent opacities. Patchy nonspecific areas of groundglass some are subpleural. Interlobular septal thickening primarily in the lung bases. No Mediastinum: No other significant abnormality. No lymphadenopathy. No pericardial effusion. Visualized abdomen: The visualized portions of the upper abdomen are unremarkable. Bones: No osseous abnormalities. NO EVIDENCE OF PULMONARY EMBOLISM. SMALL RIGHT EFFUSION. CHANGES SUGGESTIVE OF INTERSTITIAL PULMONARY EDEMA HAVE LUNG BASES. Xr Chest Portable    Result Date: 3/24/2019  EXAMINATION: XR CHEST PORTABLE DATE AND TIME:3/24/2019 11:45 AM CLINICAL HISTORY: Shortness of breath   SOB  COMPARISONS: None  FINDINGS: Increased opacity at the lung bases is suggestive of effusions and/or infiltrates. Heart size at upper limits of normal. Pulmonary vascularity is within normal limits. PROBABLE BIBASILAR INFILTRATES.       Labs:   Recent Results (from the past 72 hour(s))   CBC    Collection Time: 03/24/19 11:45 AM   Result Value Ref Range    WBC 7.6 4.8 - 10.8 K/uL    RBC 4.70 4.20 - 5.40 M/uL    Hemoglobin 14.3 12.0 - 16.0 g/dL    Hematocrit 41.8 37.0 - 47.0 %    MCV 89.1 82.0 - 100.0 fL    MCH 30.3 27.0 - 31.3 pg    MCHC 34.1 33.0 - 37.0 %    RDW 13.5 11.5 - 14.5 %    Platelets 557 180 - 981 K/uL   Comprehensive Metabolic Panel w/ Reflex to MG    Collection Time: 03/24/19 11:45 AM   Result Value Ref Range    Sodium 140 135 - 144 mEq/L    Potassium reflex Magnesium 3.9 3.4 - 4.9 mEq/L    Chloride 102 95 - 107 mEq/L    CO2 26 20 - 31 mEq/L    Anion Gap 12 9 - 15 mEq/L    Glucose 203 (H) 70 - 99 mg/dL    BUN 10 8 - 23 mg/dL    CREATININE 0.59 0.50 - 0.90 mg/dL    GFR Non-African American >60.0 >60    GFR  >60.0 >60    Calcium 9.5 8.5 - 9.9 mg/dL    Total Protein 6.8 6.3 - 8.0 g/dL    Alb 3.9 3.5 - 4.6 g/dL    Total Bilirubin 0.3 0.2 - 0.7 mg/dL    Alkaline Phosphatase 124 40 - 130 U/L    ALT 38 (H) 0 - 33 U/L    AST 40 (H) 0 - 35 U/L Globulin 2.9 2.3 - 3.5 g/dL   Troponin    Collection Time: 03/24/19 11:45 AM   Result Value Ref Range    Troponin <0.010 0.000 - 0.010 ng/mL   Brain Natriuretic Peptide    Collection Time: 03/24/19 11:45 AM   Result Value Ref Range    Pro-BNP 2,653 pg/mL   Protime-INR    Collection Time: 03/24/19 11:45 AM   Result Value Ref Range    Protime 10.2 9.0 - 11.5 sec    INR 1.0    POCT Creatinine    Collection Time: 03/24/19 12:00 PM   Result Value Ref Range    POC CREATININE WHOLE BLOOD 0.8    EKG 12 Lead    Collection Time: 03/24/19  2:51 PM   Result Value Ref Range    Ventricular Rate 85 BPM    Atrial Rate 85 BPM    P-R Interval 128 ms    QRS Duration 86 ms    Q-T Interval 398 ms    QTc Calculation (Bazett) 473 ms    P Axis 70 degrees    R Axis 39 degrees    T Axis 158 degrees   POCT Glucose    Collection Time: 03/24/19  4:04 PM   Result Value Ref Range    POC Glucose 270 (H) 60 - 115 mg/dl    Performed on ACCU-CHEK    Troponin    Collection Time: 03/24/19  4:10 PM   Result Value Ref Range    Troponin <0.010 0.000 - 0.010 ng/mL   RAPID INFLUENZA A/B ANTIGENS    Collection Time: 03/24/19  5:34 PM   Result Value Ref Range    Rapid Influenza A Ag Negative Negative    Rapid Influenza B Ag Negative Negative   POCT Glucose    Collection Time: 03/24/19  8:20 PM   Result Value Ref Range    POC Glucose 351 (H) 60 - 115 mg/dl    Performed on ACCU-CHEK    Basic Metabolic Panel    Collection Time: 03/25/19  5:38 AM   Result Value Ref Range    Sodium 142 135 - 144 mEq/L    Potassium 4.0 3.4 - 4.9 mEq/L    Chloride 104 95 - 107 mEq/L    CO2 23 20 - 31 mEq/L    Anion Gap 15 9 - 15 mEq/L    Glucose 327 (H) 70 - 99 mg/dL    BUN 19 8 - 23 mg/dL    CREATININE 0.69 0.50 - 0.90 mg/dL    GFR Non-African American >60.0 >60    GFR  >60.0 >60    Calcium 9.0 8.5 - 9.9 mg/dL   Magnesium    Collection Time: 03/25/19  5:38 AM   Result Value Ref Range    Magnesium 1.6 (L) 1.7 - 2.4 mg/dL   Lipid panel - fasting    Collection Time: 03/25/19  5:38 AM   Result Value Ref Range    Cholesterol, Total 121 0 - 199 mg/dL    Triglycerides 63 0 - 150 mg/dL    HDL 49 40 - 59 mg/dL    LDL Calculated 59 0 - 129 mg/dL   POCT Glucose    Collection Time: 03/25/19  5:53 AM   Result Value Ref Range    POC Glucose 353 (H) 60 - 115 mg/dl    Performed on ACCU-CHEK            Follow-up visits:   Isaiah Fall MD  [address] [place] [address] [facility],[address]          Isaiah Fall, [address] [facility] [address]  [address] [facility], [address] [place] [address]  [phone]    Schedule an appointment as soon as possible for a visit in 1 week         Assessment:Plan  There are no active hospital problems to display for this patient. 1.  HTN - improved. Continue BB. Advanced ACEI and added Diuretic. Stopped Amlodipine. 2. 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 LVEF 70% with Asymmetrical Septal Hypertrophy  4. DM  5. SOB - resolved. BNP elevated but not volume overloaded.                 Electronically signed by Gianna Fernandes MD on 3/25/2019 at 6:53 AM

## 2019-03-26 ENCOUNTER — CARE COORDINATION (OUTPATIENT)
Dept: CASE MANAGEMENT | Age: 74
End: 2019-03-26

## 2019-03-26 LAB
CULTURE, BLOOD 2: ABNORMAL
ORGANISM: ABNORMAL
ORGANISM: ABNORMAL

## 2019-03-27 ENCOUNTER — OFFICE VISIT (OUTPATIENT)
Dept: FAMILY MEDICINE CLINIC | Age: 74
End: 2019-03-27
Payer: MEDICARE

## 2019-03-27 VITALS
SYSTOLIC BLOOD PRESSURE: 168 MMHG | DIASTOLIC BLOOD PRESSURE: 82 MMHG | RESPIRATION RATE: 16 BRPM | HEART RATE: 72 BPM | BODY MASS INDEX: 27.01 KG/M2 | TEMPERATURE: 97.5 F | OXYGEN SATURATION: 99 % | WEIGHT: 134 LBS | HEIGHT: 59 IN

## 2019-03-27 DIAGNOSIS — R93.1 ABNORMAL ECHOCARDIOGRAM: ICD-10-CM

## 2019-03-27 DIAGNOSIS — E78.49 OTHER HYPERLIPIDEMIA: ICD-10-CM

## 2019-03-27 DIAGNOSIS — I10 ESSENTIAL HYPERTENSION: Primary | ICD-10-CM

## 2019-03-27 PROCEDURE — 3014F SCREEN MAMMO DOC REV: CPT | Performed by: FAMILY MEDICINE

## 2019-03-27 PROCEDURE — 99214 OFFICE O/P EST MOD 30 MIN: CPT | Performed by: FAMILY MEDICINE

## 2019-03-27 PROCEDURE — G8417 CALC BMI ABV UP PARAM F/U: HCPCS | Performed by: FAMILY MEDICINE

## 2019-03-27 PROCEDURE — G8484 FLU IMMUNIZE NO ADMIN: HCPCS | Performed by: FAMILY MEDICINE

## 2019-03-27 PROCEDURE — 4040F PNEUMOC VAC/ADMIN/RCVD: CPT | Performed by: FAMILY MEDICINE

## 2019-03-27 PROCEDURE — 1111F DSCHRG MED/CURRENT MED MERGE: CPT | Performed by: FAMILY MEDICINE

## 2019-03-27 PROCEDURE — 4004F PT TOBACCO SCREEN RCVD TLK: CPT | Performed by: FAMILY MEDICINE

## 2019-03-27 PROCEDURE — G8400 PT W/DXA NO RESULTS DOC: HCPCS | Performed by: FAMILY MEDICINE

## 2019-03-27 PROCEDURE — 1123F ACP DISCUSS/DSCN MKR DOCD: CPT | Performed by: FAMILY MEDICINE

## 2019-03-27 PROCEDURE — 3017F COLORECTAL CA SCREEN DOC REV: CPT | Performed by: FAMILY MEDICINE

## 2019-03-27 PROCEDURE — 1090F PRES/ABSN URINE INCON ASSESS: CPT | Performed by: FAMILY MEDICINE

## 2019-03-27 PROCEDURE — G8427 DOCREV CUR MEDS BY ELIG CLIN: HCPCS | Performed by: FAMILY MEDICINE

## 2019-03-27 ASSESSMENT — ENCOUNTER SYMPTOMS: ABDOMINAL PAIN: 0

## 2019-03-29 LAB — BLOOD CULTURE, ROUTINE: NORMAL

## 2019-04-01 RX ORDER — ALBUTEROL SULFATE 90 UG/1
2 AEROSOL, METERED RESPIRATORY (INHALATION) EVERY 6 HOURS PRN
Qty: 1 INHALER | Refills: 0 | Status: ON HOLD | OUTPATIENT
Start: 2019-04-01 | End: 2019-10-08

## 2019-04-01 NOTE — TELEPHONE ENCOUNTER
Pt called stating she needs med refill due to not receiving Rx upon hospital d/c.        Rx requested:  Requested Prescriptions     Pending Prescriptions Disp Refills    albuterol sulfate HFA (PROVENTIL HFA) 108 (90 Base) MCG/ACT inhaler 1 Inhaler 3     Sig: Inhale 2 puffs into the lungs every 6 hours as needed for Wheezing       Last Office Visit:   3/27/2019      Next Visit Date:  Future Appointments   Date Time Provider Zaid Burrellisti   4/17/2019  9:45 AM Faby Camilo MD [address] [place] [address]   4/17/2019 11:30 AM [facility]   5/29/2019 11:00 AM Nicci Chinchilla DO [facility]

## 2019-04-17 ENCOUNTER — PROCEDURE VISIT (OUTPATIENT)
Dept: OBGYN CLINIC | Age: 74
End: 2019-04-17
Payer: MEDICARE

## 2019-04-17 VITALS
HEIGHT: 59 IN | SYSTOLIC BLOOD PRESSURE: 148 MMHG | BODY MASS INDEX: 27.42 KG/M2 | WEIGHT: 136 LBS | DIASTOLIC BLOOD PRESSURE: 68 MMHG

## 2019-04-17 DIAGNOSIS — N88.2 CERVICAL STENOSIS (UTERINE CERVIX): Primary | ICD-10-CM

## 2019-04-17 DIAGNOSIS — R93.89 ENDOMETRIAL THICKENING ON ULTRASOUND: ICD-10-CM

## 2019-04-17 PROCEDURE — 58100 BIOPSY OF UTERUS LINING: CPT | Performed by: OBSTETRICS & GYNECOLOGY

## 2019-04-17 RX ORDER — MISOPROSTOL 200 UG/1
200 TABLET ORAL EVERY 12 HOURS
Qty: 6 TABLET | Refills: 0 | Status: ON HOLD | OUTPATIENT
Start: 2019-04-17 | End: 2019-10-08

## 2019-04-29 DIAGNOSIS — Z79.899 HIGH RISK MEDICATION USE: ICD-10-CM

## 2019-04-29 DIAGNOSIS — M54.17 LUMBOSACRAL RADICULOPATHY AT L5: Chronic | ICD-10-CM

## 2019-04-29 DIAGNOSIS — M47.26 OSTEOARTHRITIS OF SPINE WITH RADICULOPATHY, LUMBAR REGION: ICD-10-CM

## 2019-04-29 DIAGNOSIS — M79.10 MYALGIA: ICD-10-CM

## 2019-04-29 DIAGNOSIS — G58.0 INTERCOSTAL NEUROPATHY: ICD-10-CM

## 2019-04-29 DIAGNOSIS — E11.49 DIABETIC RADICULOPATHY (HCC): ICD-10-CM

## 2019-04-29 DIAGNOSIS — M54.12 CERVICAL RADICULAR PAIN: ICD-10-CM

## 2019-04-29 DIAGNOSIS — M54.10 DIABETIC RADICULOPATHY (HCC): ICD-10-CM

## 2019-04-30 RX ORDER — HYDROCODONE BITARTRATE AND ACETAMINOPHEN 5; 325 MG/1; MG/1
1 TABLET ORAL EVERY 8 HOURS PRN
Qty: 80 TABLET | Refills: 0 | Status: SHIPPED | OUTPATIENT
Start: 2019-04-30 | End: 2019-05-31 | Stop reason: SDDI

## 2019-05-01 ENCOUNTER — OFFICE VISIT (OUTPATIENT)
Dept: CARDIOLOGY CLINIC | Age: 74
End: 2019-05-01
Payer: MEDICARE

## 2019-05-01 VITALS
HEIGHT: 59 IN | BODY MASS INDEX: 27.62 KG/M2 | DIASTOLIC BLOOD PRESSURE: 76 MMHG | SYSTOLIC BLOOD PRESSURE: 138 MMHG | RESPIRATION RATE: 14 BRPM | OXYGEN SATURATION: 99 % | HEART RATE: 113 BPM | WEIGHT: 137 LBS

## 2019-05-01 DIAGNOSIS — I10 ESSENTIAL HYPERTENSION: ICD-10-CM

## 2019-05-01 DIAGNOSIS — E78.2 MIXED HYPERLIPIDEMIA: ICD-10-CM

## 2019-05-01 DIAGNOSIS — I50.30 DIASTOLIC CHF WITH PRESERVED LEFT VENTRICULAR FUNCTION, NYHA CLASS 2 (HCC): Primary | ICD-10-CM

## 2019-05-01 PROCEDURE — 1123F ACP DISCUSS/DSCN MKR DOCD: CPT | Performed by: INTERNAL MEDICINE

## 2019-05-01 PROCEDURE — 1090F PRES/ABSN URINE INCON ASSESS: CPT | Performed by: INTERNAL MEDICINE

## 2019-05-01 PROCEDURE — 4040F PNEUMOC VAC/ADMIN/RCVD: CPT | Performed by: INTERNAL MEDICINE

## 2019-05-01 PROCEDURE — G8427 DOCREV CUR MEDS BY ELIG CLIN: HCPCS | Performed by: INTERNAL MEDICINE

## 2019-05-01 PROCEDURE — 3014F SCREEN MAMMO DOC REV: CPT | Performed by: INTERNAL MEDICINE

## 2019-05-01 PROCEDURE — G8400 PT W/DXA NO RESULTS DOC: HCPCS | Performed by: INTERNAL MEDICINE

## 2019-05-01 PROCEDURE — 3017F COLORECTAL CA SCREEN DOC REV: CPT | Performed by: INTERNAL MEDICINE

## 2019-05-01 PROCEDURE — G8417 CALC BMI ABV UP PARAM F/U: HCPCS | Performed by: INTERNAL MEDICINE

## 2019-05-01 PROCEDURE — 4004F PT TOBACCO SCREEN RCVD TLK: CPT | Performed by: INTERNAL MEDICINE

## 2019-05-01 PROCEDURE — 99213 OFFICE O/P EST LOW 20 MIN: CPT | Performed by: INTERNAL MEDICINE

## 2019-05-01 NOTE — PROGRESS NOTES
[facility] CARDIOLOGY OFFICE FOLLOW-UP      Patient: Kanchan Vargas  YOB: 1945  MRN: [id number]    Chief Complaint:  Chief Complaint   Patient presents with    Follow[facility] from [facility] 3/24/19    Congestive Heart Failure    Hypertension       Subjective/HPI    The patient is followed in the cardiology office chronically for the following problems: Diastolic CHF, hypertensive urgency, normal LV    The last encounter focused on the following: consult in hospital for HTN, CHF    Testing/hospitalizations/procedures performed since last encounter: none    Since the last encounter, the patient has not had new symptoms/events. BP has been good. Past Medical History:   Diagnosis Date    Anxiety     Asthma     Chronic back pain     Bilateral L5 S1 Radic on emg--surprisingly worse on the left than the right--pt's symptoms and her MRI show worse on the right    Depression     Fibromyalgia     Hyperlipidemia     Hypertension     Osteoarthritis     Type II diabetes mellitus, uncontrolled ([address]     Unspecified sleep apnea        Past Surgical History:   Procedure Laterality Date    BACK SURGERY      CARDIAC CATHETERIZATION  11/3/14     DR. MIRELES     COLONOSCOPY  08/29/2016    w/polypectomy     EYE SURGERY      DE ESOPHAGOGASTRODUODENOSCOPY TRANSORAL DIAGNOSTIC N/A 3/24/2017    EGD ESOPHAGOGASTRODUODENOSCOPY performed [facility] Jon Enciso [facility] N/A 2/8/2018    negative findings    DE REVISE MEDIAN N/CARPAL TUNNEL SURG Left 6/5/2017    LEFT  CARPAL TUNNEL RELEASE performed by Claudette Baron, MD at [facility]6+H/T,JPEAP N/A 2/8/2018    TONSILLECTOMY      UPPER GASTROINTESTINAL ENDOSCOPY  08/26/2016    w/bx        Family History   Problem Relation Age of Onset    Heart Disease Father     Arthritis Father     Arthritis Mother        Social History     Socioeconomic History    Marital status:      Spouse name: None    Number of children: None    Years of education: None    Highest education level: None   Occupational History    Occupation: Retired-   Social Needs    Financial resource strain: None    Food insecurity:     Worry: None     Inability: None    Transportation needs:     Medical: None     Non-medical: None   Tobacco Use    Smoking status: Heavy Tobacco Smoker     Packs/day: 1.00     Years: 15.00     Pack years: 15.00     Types: Cigarettes     Start date: 11/30/2017    Smokeless tobacco: Never Used    Tobacco comment: smokes about 4 cigarettes a day   Substance and Sexual Activity    Alcohol use: No    Drug use: No    Sexual activity: Yes   Lifestyle    Physical activity:     Days per week: None     Minutes per session: None    Stress: None   Relationships    Social connections:     Talks on phone: None     Gets together: None     Attends Restoration service: None     Active member of club or organization: None     Attends meetings of clubs or organizations: None     Relationship status: None    Intimate partner violence:     Fear of current or ex partner: None     Emotionally abused: None     Physically abused: None     Forced sexual activity: None   Other Topics Concern    None   Social History Narrative    None       Allergies   Allergen Reactions    Darvon [Propoxyphene Hcl]     Ibuprofen Nausea Only    Metformin And Related      Diarrhea         Current Outpatient Medications   Medication Sig Dispense Refill    HYDROcodone-acetaminophen (NORCO) 5-325 MG per tablet Take 1 tablet by mouth every 8 hours as needed for Pain (max of 3 per day) for up to 30 days.  Max of 80 per month 80 tablet 0    insulin 70-30 (HUMULIN 70/30) (70-30) 100 UNIT per ML injection vial Inject 60 units bid please give 4 vials for a 30 day supply 4 vial 3    lisinopril (PRINIVIL;ZESTRIL) 10 MG tablet TAKE 1 TABLET BY MOUTH TWICE DAILY 180 tablet 3    albuterol sulfate HFA (PROVENTIL HFA) 108 (90 Base) MCG/ACT inhaler Inhale 2 puffs into the lungs every 6 hours as needed for Wheezing 1 Inhaler 0    aspirin 81 MG EC tablet Take 1 tablet by mouth daily 30 tablet 3    furosemide (LASIX) 40 MG tablet Take 1 tablet by mouth daily 60 tablet 3    albuterol (PROVENTIL) (5 MG/ML) 0.5% nebulizer solution Take 0.5 mLs by nebulization every 6 hours as needed for Wheezing 120 each 0    ezetimibe (ZETIA) 10 MG tablet Take 1 tablet by mouth daily 30 tablet 1    PARoxetine (PAXIL) 40 MG tablet TAKE 1 TABLET BY MOUTH ONCE DAILY IN THE MORNING 30 tablet 1    metoprolol succinate (TOPROL XL) 200 MG extended release tablet 2 po q day 60 tablet 1    rosuvastatin (CRESTOR) 40 MG tablet Take 1 tablet by mouth every evening 90 tablet 0    misoprostol (CYTOTEC) 200 MCG tablet Take 1 tablet by mouth every 12 hours for 3 days 6 tablet 0    pregabalin (LYRICA) 25 MG capsule Take 1 capsule by mouth 3 times daily for 90 days. . 270 capsule 0     No current facility-administered medications for this visit. Review of Systems:   Review of Systems   Constitutional: Negative for activity change and appetite change. HENT: Negative for congestion. Respiratory: Negative for apnea, choking and chest tightness. Cardiovascular: Negative for chest pain. Gastrointestinal: Negative for abdominal distention and abdominal pain. Endocrine: Negative for cold intolerance and heat intolerance. Genitourinary: Negative for dysuria and enuresis. Musculoskeletal: Negative for arthralgias and back pain. Skin: Negative for color change. Allergic/Immunologic: Negative. Neurological: Negative for dizziness, seizures, syncope and light-headedness. Psychiatric/Behavioral: Negative for agitation, behavioral problems and confusion.        Physical Examination:    /76 (Site: Left Upper Arm, Position: Sitting, Cuff Size: Medium Adult)   Pulse 113   Resp 14   Ht 4' 11\" (1.499 m)   Wt 137 lb (62.1 kg)   LMP  (LMP Unknown)   SpO2 99%   BMI 27.67 kg/m²    Physical Exam   Constitutional: The patient appears healthy. No distress. HENT: Mouth/Throat: Oropharynx is clear. Eyes: Pupils are equal, round, and reactive to light. Neck: Normal range of motion. No JVD present. Cardiovascular: Regular rhythm, S1 normal, S2 normal, normal heart sounds and intact distal pulses. Exam reveals no gallop. No murmur heard. Pulses:       Radial pulses are 2+ on the right side. Dorsalis pedis pulses are 2+ on the right side. Pulmonary/Chest: Effort normal and breath sounds normal. No wheezes. No rales. No tenderness. Abdominal: Soft. Bowel sounds are normal.   Musculoskeletal: Normal range of motion. No edema. Neurological: The patient is alert and oriented to person, place, and time. Intact cranial nerves. Skin: Skin is warm and dry. No rash noted.        LABS:  CBC:   Lab Results   Component Value Date    WBC 7.6 03/24/2019    RBC 4.70 03/24/2019    HGB 14.3 03/24/2019    HCT 41.8 03/24/2019    MCV 89.1 03/24/2019    MCH 30.3 03/24/2019    MCHC 34.1 03/24/2019    RDW 13.5 03/24/2019     03/24/2019    MPV 8.8 08/01/2015     Lipids:  Lab Results   Component Value Date    CHOL 121 03/25/2019    CHOL 121 03/20/2019    CHOL 220 (H) 01/19/2019     Lab Results   Component Value Date    TRIG 63 03/25/2019    TRIG 111 03/20/2019    TRIG 105 01/19/2019     Lab Results   Component Value Date    HDL 49 03/25/2019    HDL 51 03/20/2019    HDL 59 01/19/2019     Lab Results   Component Value Date    LDLCALC 59 03/25/2019    LDLCALC 48 03/20/2019    LDLCALC 140 (H) 01/19/2019     No results found for: LABVLDL, VLDL  Lab Results   Component Value Date    CHOLHDLRATIO 3.4 09/11/2012     CMP:    Lab Results   Component Value Date     03/25/2019    K 4.0 03/25/2019    K 3.9 03/24/2019     03/25/2019    CO2 23 03/25/2019    BUN 19 03/25/2019    CREATININE 0.69 03/25/2019    GFRAA >60.0 03/25/2019    LABGLOM >60.0 03/25/2019    GLUCOSE 327 03/25/2019    PROT 6.8 03/24/2019    LABALBU 3.9 03/24/2019    CALCIUM 9.0 03/25/2019    BILITOT 0.3 03/24/2019    ALKPHOS 124 03/24/2019    AST 40 03/24/2019    ALT 38 03/24/2019     BMP:    Lab Results   Component Value Date     03/25/2019    K 4.0 03/25/2019    K 3.9 03/24/2019     03/25/2019    CO2 23 03/25/2019    BUN 19 03/25/2019    LABALBU 3.9 03/24/2019    CREATININE 0.69 03/25/2019    CALCIUM 9.0 03/25/2019    GFRAA >60.0 03/25/2019    LABGLOM >60.0 03/25/2019    GLUCOSE 327 03/25/2019     Magnesium:    Lab Results   Component Value Date    MG 1.6 03/25/2019     TSH:  Lab Results   Component Value Date    TSH 0.421 05/03/2014       Patient Active Problem List   Diagnosis    Hypertension    Hyperlipidemia    Uncontrolled type 2 diabetes mellitus with complication, without long-term current use of insulin ([facility])    Fibromyalgia    Anxiety    Asthma    Smoker    Insomnia    DJD (degenerative joint disease), lumbar    Lumbosacral radiculopathy at S1    DDD (degenerative disc disease), lumbar    Dysphonia    CTS (carpal tunnel syndrome)    High risk medication use-Norco - 12/20/17 OARRS PM&R, 02/20/18 OAR[facility] PM&R, 03/07/18 OAR[facility] PM&R, Urine Drug screen negative 02/06/17 PM&R--MED CONTRACT 2/6/17    SOB (shortness of breath) on exertion    Chest pain    Memory deficit    Artificial lens present    Presbyopia    Astigmatism, regular    Cataract, nuclear sclerotic senile    IDDM (insulin dependent diabetes mellitus) ([facility] [address]    Regular astigmatism    Nuclear senile cataract    Neck pain    Lumbosacral radiculopathy at L5    Spinal stenosis of lumbar region with neurogenic claudication    Impaired mobility and activities of daily living    Non-compliant patient NO showed FU 1/10/17 Dr Brionna Mcclellan Insomnia secondary to chronic pain    Reactive depression    Diabetic radiculopathy (HCC)    Cervical radicular pain    Diabetic asymmetric polyneuropathy (HCC)    Abdominal pain, right lower quadrant    Myalgia    Intercostal neuropathy    Osteoarthritis of spine with radiculopathy, lumbar region    Acute combined systolic and diastolic CHF, NYHA class 1 ([facility])       Medications:  Current Outpatient Medications   Medication Sig Dispense Refill    HYDROcodone-acetaminophen (NORCO) 5-325 MG per tablet Take 1 tablet by mouth every 8 hours as needed for Pain (max of 3 per day) for up to 30 days. Max of 80 per month 80 tablet 0    insulin 70-30 (HUMULIN 70/30) (70-30) 100 UNIT per ML injection vial Inject 60 units bid please give 4 vials for a 30 day supply 4 vial 3    lisinopril (PRINIVIL;ZESTRIL) 10 MG tablet TAKE 1 TABLET BY MOUTH TWICE DAILY 180 tablet 3    albuterol sulfate HFA (PROVENTIL HFA) 108 (90 Base) MCG/ACT inhaler Inhale 2 puffs into the lungs every 6 hours as needed for Wheezing 1 Inhaler 0    aspirin 81 MG EC tablet Take 1 tablet by mouth daily 30 tablet 3    furosemide (LASIX) 40 MG tablet Take 1 tablet by mouth daily 60 tablet 3    albuterol (PROVENTIL) (5 MG/ML) 0.5% nebulizer solution Take 0.5 mLs by nebulization every 6 hours as needed for Wheezing 120 each 0    ezetimibe (ZETIA) 10 MG tablet Take 1 tablet by mouth daily 30 tablet 1    PARoxetine (PAXIL) 40 MG tablet TAKE 1 TABLET BY MOUTH ONCE DAILY IN THE MORNING 30 tablet 1    metoprolol succinate (TOPROL XL) 200 MG extended release tablet 2 po q day 60 tablet 1    rosuvastatin (CRESTOR) 40 MG tablet Take 1 tablet by mouth every evening 90 tablet 0    misoprostol (CYTOTEC) 200 MCG tablet Take 1 tablet by mouth every 12 hours for 3 days 6 tablet 0    pregabalin (LYRICA) 25 MG capsule Take 1 capsule by mouth 3 times daily for 90 days. . 270 capsule 0     No current facility-administered medications for this visit. Assessment/Plan:    1. Diastolic CHF with preserved left ventricular function, NYHA class 2 ([facility])  Class II compensated.     2. Essential hypertension  Patient has essential hypertension on BP medications. The guideline-directed target for BP in this patient is <130/80. In order to reach our target BP we will continue current BP medications, increasing the dose of current meds or adding new to reach target. 3. Mixed hyperlipidemia  The patient has hyperlipidemia without statin intolerance. The patient will be continued on high intensity statin. The labs are managed by PCP. As per recent guidelines, moderate dose high intensity statin is indicated. Patient doesn't want to do a stress or KOLTON at this time. NO symptoms. Doing well. Follow-up 3 months. Counseling: the patient was counseled regarding diet, exercise, weight control and heart healthy lifestyle. Return in about 3 months (around 8/1/2019) for followup cv disease. Electronically signed by   Shaye Dumont.  Talia Soto MD [facility] Director of Cardiology Services and Cardiac Catheterization Laboratory  [facility], [place]    on 5/1/2019 at 10:13 AM

## 2019-05-10 ENCOUNTER — CARE COORDINATION (OUTPATIENT)
Dept: CARE COORDINATION | Age: 74
End: 2019-05-10

## 2019-05-10 NOTE — LETTER
5/10/2019    Ruba Hoang  [address]  [address] [address]      Dear Ruba Hoang,    My name is Nivia Patel and I am a registered nurse who partners with Gael Christiansen MD to improve patients' health. Gael Christiansen MD believes you would benefit from working with me. As a member of your health care team, I would work with other providers involved in your care, offer education for your specific health conditions, and connect you with additional resources as needed. I will collaborate with Gael Christiansen MD to support you in following your treatment plan. The additional support I provide is no additional cost to you. My primary focus is to help you achieve specific goals and improve your health. We are committed to walk with you on this journey and look forward to working with you. Please call me to further discuss your healthcare needs. I am available by phone or for appointments at the office. You can reach me at [phone].     In good health,     Nivia Patel RN

## 2019-05-20 ENCOUNTER — CARE COORDINATION (OUTPATIENT)
Dept: CARE COORDINATION | Age: 74
End: 2019-05-20

## 2019-05-20 NOTE — LETTER
5/20/2019     [address]  [facility] [address]    Dear Jonell Skiff recently attempted to contact you to discuss your healthcare needs. My name is Guerline Govea and I am a registered nurse who partners with Samantha Fatima MD to improve patients health. As a member of your health care team, I would work with other providers involved in your care, offer education for your specific health conditions, and connect you with additional resources as needed. I will collaborate with Carlos Santiago MD to support you in following your treatment plan. The additional support I provide is no additional cost to you. My primary focus is to help you achieve specific goals and improve your health. I look forward to working with you. Please contact me at your earliest convenience at [phone].     In good health,    Guerline Govea RN

## 2019-05-22 NOTE — LETTER
[address]  [address], [place] [address]  Phone: [phone]  Fax: [address] [place] [address]           05/23/19     Dear Ruba Hoang,    We tried to reach you recently regarding your rosuvastatin (Crestor), but were unable to reach you on the telephone. It appears that this medication has not been filled at proper times. We are worried you might be missing doses or not taking it as directed. It is important that you take your medications regularly and try not to miss a single dose. We have on file that you are currently taking rosuvastatin 40 mg once daily. If you are no longer taking it or taking it differently than above, please call us at the number below so that we can discuss this and update your medication profile.     Some ways to help you remember to take and refill your medications are to:  · Use a pill box, set an alarm, and/or keep your medication near something that you do every day  · Fill a 3-month supply of your prescription at a time to save you time and trips to the pharmacy  if you would like assistance in switching your prescriptions to a 3-month supply, please contact us  · Ask your pharmacy if they participate in [facility]", a program where you can  all of your medications on the same day each month  · Ask your pharmacy if you can be set up with automatic refill, where they will automatically refill your prescription when it is due and let you know it's ready to     Sincerely,     Jordis Claude, PharmD  [address]  Phone: [phone], option 7

## 2019-05-22 NOTE — TELEPHONE ENCOUNTER
CLINICAL PHARMACY: STATIN THERAPY REVIEW    Identified care gap per [employer]: statin use in persons with diabetes and adherence   Per chart review, patient is prescribed rosuvastatin 40 mg daily. Per [facility]:   Rosuvastatin has never been filled. The prescription that was sent over on 8/15/18 was never filled by the patient and has since . Attempting to reach patient to review. Left message asking for return call to toll free [phone] option 7. Will attempt to contact the patient again.      Sabina Mccarthy, PharmD, [address]  Direct: [phone]  [facility], toll free [phone], option 7

## 2019-05-23 RX ORDER — ROSUVASTATIN CALCIUM 40 MG/1
40 TABLET, COATED ORAL EVERY EVENING
Qty: 90 TABLET | Refills: 0 | Status: SHIPPED | OUTPATIENT
Start: 2019-05-23 | End: 2019-08-22 | Stop reason: SDUPTHER

## 2019-05-23 NOTE — TELEPHONE ENCOUNTER
Dr. Hebert Chaney,     Patient's rosuvastatin prescription has . It does not appear that the patient has filled this in over 6 months. I have pended a refill request for your convenience. Last OV on 3/27/19. Thank you,   Aubrie Chan PharmD, [address]  Direct: [phone]  Department, toll free [phone], option 7      ===========================================================    Second attempt made to contact the patient in regards to rosuvastatin therapy. Left message for patient to return my call. Prescription on file at the pharmacy has . Will send a refill request to PCP today. Will prepare and send the patient a letter today as well.      Aubrie Chan PharmD, [address]  Direct: [phone]  Department, toll free [phone], option 7

## 2019-05-31 ENCOUNTER — OFFICE VISIT (OUTPATIENT)
Dept: PHYSICAL MEDICINE AND REHAB | Age: 74
End: 2019-05-31
Payer: MEDICARE

## 2019-05-31 VITALS
WEIGHT: 131 LBS | SYSTOLIC BLOOD PRESSURE: 140 MMHG | BODY MASS INDEX: 26.41 KG/M2 | DIASTOLIC BLOOD PRESSURE: 80 MMHG | HEIGHT: 59 IN

## 2019-05-31 DIAGNOSIS — G47.01 INSOMNIA SECONDARY TO CHRONIC PAIN: ICD-10-CM

## 2019-05-31 DIAGNOSIS — G58.0 INTERCOSTAL NEUROPATHY: ICD-10-CM

## 2019-05-31 DIAGNOSIS — M47.26 OSTEOARTHRITIS OF SPINE WITH RADICULOPATHY, LUMBAR REGION: ICD-10-CM

## 2019-05-31 DIAGNOSIS — M51.36 DDD (DEGENERATIVE DISC DISEASE), LUMBAR: ICD-10-CM

## 2019-05-31 DIAGNOSIS — E11.49 DIABETIC RADICULOPATHY (HCC): ICD-10-CM

## 2019-05-31 DIAGNOSIS — Z79.899 HIGH RISK MEDICATION USE: ICD-10-CM

## 2019-05-31 DIAGNOSIS — M47.896 OTHER OSTEOARTHRITIS OF SPINE, LUMBAR REGION: ICD-10-CM

## 2019-05-31 DIAGNOSIS — F41.9 ANXIETY: ICD-10-CM

## 2019-05-31 DIAGNOSIS — M79.10 MYALGIA: ICD-10-CM

## 2019-05-31 DIAGNOSIS — M54.17 LUMBOSACRAL RADICULOPATHY AT L5: Primary | Chronic | ICD-10-CM

## 2019-05-31 DIAGNOSIS — M54.10 DIABETIC RADICULOPATHY (HCC): ICD-10-CM

## 2019-05-31 DIAGNOSIS — M54.12 CERVICAL RADICULAR PAIN: ICD-10-CM

## 2019-05-31 DIAGNOSIS — M48.062 SPINAL STENOSIS OF LUMBAR REGION WITH NEUROGENIC CLAUDICATION: Chronic | ICD-10-CM

## 2019-05-31 DIAGNOSIS — G89.29 INSOMNIA SECONDARY TO CHRONIC PAIN: ICD-10-CM

## 2019-05-31 PROCEDURE — 3017F COLORECTAL CA SCREEN DOC REV: CPT | Performed by: PHYSICAL MEDICINE & REHABILITATION

## 2019-05-31 PROCEDURE — 2022F DILAT RTA XM EVC RTNOPTHY: CPT | Performed by: PHYSICAL MEDICINE & REHABILITATION

## 2019-05-31 PROCEDURE — 4004F PT TOBACCO SCREEN RCVD TLK: CPT | Performed by: PHYSICAL MEDICINE & REHABILITATION

## 2019-05-31 PROCEDURE — 1123F ACP DISCUSS/DSCN MKR DOCD: CPT | Performed by: PHYSICAL MEDICINE & REHABILITATION

## 2019-05-31 PROCEDURE — 3014F SCREEN MAMMO DOC REV: CPT | Performed by: PHYSICAL MEDICINE & REHABILITATION

## 2019-05-31 PROCEDURE — 1090F PRES/ABSN URINE INCON ASSESS: CPT | Performed by: PHYSICAL MEDICINE & REHABILITATION

## 2019-05-31 PROCEDURE — 3046F HEMOGLOBIN A1C LEVEL >9.0%: CPT | Performed by: PHYSICAL MEDICINE & REHABILITATION

## 2019-05-31 PROCEDURE — G8417 CALC BMI ABV UP PARAM F/U: HCPCS | Performed by: PHYSICAL MEDICINE & REHABILITATION

## 2019-05-31 PROCEDURE — G8400 PT W/DXA NO RESULTS DOC: HCPCS | Performed by: PHYSICAL MEDICINE & REHABILITATION

## 2019-05-31 PROCEDURE — 99214 OFFICE O/P EST MOD 30 MIN: CPT | Performed by: PHYSICAL MEDICINE & REHABILITATION

## 2019-05-31 PROCEDURE — G8427 DOCREV CUR MEDS BY ELIG CLIN: HCPCS | Performed by: PHYSICAL MEDICINE & REHABILITATION

## 2019-05-31 PROCEDURE — 4040F PNEUMOC VAC/ADMIN/RCVD: CPT | Performed by: PHYSICAL MEDICINE & REHABILITATION

## 2019-05-31 RX ORDER — HYDROCODONE BITARTRATE AND ACETAMINOPHEN 5; 325 MG/1; MG/1
1 TABLET ORAL EVERY 8 HOURS PRN
Qty: 80 TABLET | Refills: 0 | Status: CANCELLED | OUTPATIENT
Start: 2019-05-31 | End: 2019-06-30

## 2019-05-31 ASSESSMENT — ENCOUNTER SYMPTOMS
RESPIRATORY NEGATIVE: 1
BACK PAIN: 1
DIARRHEA: 0
CONSTIPATION: 0
ABDOMINAL PAIN: 1
EYES NEGATIVE: 1
COLOR CHANGE: 1
SHORTNESS OF BREATH: 0
CHEST TIGHTNESS: 0
VOMITING: 0
NAUSEA: 0
WHEEZING: 0
ABDOMINAL DISTENTION: 0
ALLERGIC/IMMUNOLOGIC NEGATIVE: 1

## 2019-05-31 ASSESSMENT — CROHNS DISEASE ACTIVITY INDEX (CDAI): CDAI SCORE: 0

## 2019-05-31 NOTE — PROGRESS NOTES
Subjective  Huy Bates, 75 y.o. female presents today with:    Back Pain Lower back     Leg Pain Right    Medication Refill Norco and Lyrica pill count today-pt compliant-but did not get the tox screen    Mental Health Problem coping well        Patient  Is on rare Norco and   Lyrica-which helps her pain--no side effects--no signs of abuse--is more functional with the meds and has possitive interactions with friends and family. She understands that these medications may be long-term because of her severe unrelenting arthritic pain. Abdominal Pain   This is a recurrent (dt her diabetes) problem. The current episode started more than 1 year ago. The onset quality is undetermined. The problem occurs constantly. The problem has been unchanged. The pain is located in the generalized abdominal region, LUQ and LLQ. The pain is at a severity of 2/10. The pain is severe. The quality of the pain is aching and dull. The abdominal pain does not radiate. Associated symptoms include arthralgias, headaches and myalgias. Pertinent negatives include no constipation, diarrhea, dysuria, fever, frequency, hematuria, nausea or vomiting. Nothing aggravates the pain. The pain is relieved by nothing. She has tried nothing for the symptoms. The treatment provided no relief. Prior diagnostic workup includes GI consult, upper endoscopy and lower endoscopy. There is no history of abdominal surgery, colon cancer, Crohn's disease, gallstones, GERD, irritable bowel syndrome, pancreatitis, PUD or ulcerative colitis. Back Pain   This is a chronic problem. The current episode started more than 1 year ago. The problem occurs constantly. The problem is unchanged. The pain is present in the gluteal, lumbar spine and sacro-iliac. The quality of the pain is described as aching. The pain is at a severity of 6/10. Associated symptoms include abdominal pain, headaches and numbness (Left hand).  Pertinent negatives include no chest pain, tobacco: Never Used    Tobacco comment: smokes about 4 cigarettes a day   Substance and Sexual Activity    Alcohol use: No    Drug use: No    Sexual activity: Yes   Lifestyle    Physical activity:     Days per week: None     Minutes per session: None    Stress: None   Relationships    Social connections:     Talks on phone: None     Gets together: None     Attends Orthodox service: None     Active member of club or organization: None     Attends meetings of clubs or organizations: None     Relationship status: None    Intimate partner violence:     Fear of current or ex partner: None     Emotionally abused: None     Physically abused: None     Forced sexual activity: None   Other Topics Concern    None   Social History Narrative    None     Family History   Problem Relation Age of Onset    Heart Disease Father     Arthritis Father     Arthritis Mother        Allergies   Allergen Reactions    Darvon [Propoxyphene Hcl]     Ibuprofen Nausea Only    Metformin And Related      Diarrhea         Review of Systems   Constitutional: Positive for appetite change and fatigue. Negative for activity change and fever. HENT: Positive for dental problem. Eyes: Negative. Respiratory: Negative. Negative for chest tightness, shortness of breath and wheezing. Cardiovascular: Negative. Negative for chest pain, palpitations and leg swelling. Gastrointestinal: Positive for abdominal pain. Negative for abdominal distention, constipation, diarrhea, nausea and vomiting. Endocrine: Negative. Genitourinary: Negative for difficulty urinating, dysuria, frequency, hematuria, pelvic pain, urgency and vaginal discharge. Musculoskeletal: Positive for arthralgias, back pain and myalgias. Negative for gait problem, joint swelling and neck pain. Skin: Positive for color change. Allergic/Immunologic: Negative. Neurological: Positive for dizziness, light-headedness, numbness (Left hand) and headaches. Negative for weakness. Psychiatric/Behavioral: Positive for dysphoric mood. Negative for hallucinations and sleep disturbance. The patient is nervous/anxious. Objective    Vitals:    05/31/19 1019   BP: (!) 140/80   Weight: 131 lb (59.4 kg)   Height: 4' 11\" (1.499 m)     Pain Score: NINE       Physical Exam   Constitutional: She is oriented to person, place, and time. Vital signs are normal. She appears well-developed and well-nourished. Non-toxic appearance. She does not have a sickly appearance. She does not appear ill. No distress. HENT:   Head: Normocephalic and atraumatic. Right Ear: Hearing normal.   Left Ear: Hearing normal.   Nose: Nose normal.   Mouth/Throat: Oropharynx is clear and moist and mucous membranes are normal. No oral lesions. Normal dentition. No oropharyngeal exudate. dysphonic   Eyes: Pupils are equal, round, and reactive to light. Conjunctivae and EOM are normal. Right eye exhibits no chemosis, no discharge and no exudate. Left eye exhibits no chemosis, no discharge and no exudate. No scleral icterus. Neck: Normal range of motion. Neck supple. No JVD present. No neck rigidity. No tracheal deviation and no edema present. No thyromegaly present. Cardiovascular: Normal rate, regular rhythm, normal heart sounds and intact distal pulses. Exam reveals no gallop, no friction rub and no decreased pulses. No murmur heard. Pulmonary/Chest: Effort normal. No accessory muscle usage. No apnea, no tachypnea and no bradypnea. No respiratory distress. She has decreased breath sounds. She has no wheezes. She has no rales. She exhibits no tenderness. Abdominal: Soft. Bowel sounds are normal. She exhibits no distension and no mass. There is no tenderness. There is no rebound and no guarding. Area of pain and hypersensitivity   Musculoskeletal: She exhibits tenderness. She exhibits no edema.         Right shoulder: Normal.        Left shoulder: Normal.        Right elbow: Normal.       Left elbow: Normal.        Right wrist: Normal.        Left wrist: Normal.        Right hip: Normal.        Left hip: Normal.        Right knee: She exhibits decreased range of motion and swelling. Tenderness found. Medial joint line tenderness noted. Left knee: She exhibits decreased range of motion. Tenderness found. Medial joint line and lateral joint line tenderness noted. Right ankle: Normal. Achilles tendon normal.        Left ankle: Normal. Achilles tendon normal.        Cervical back: She exhibits decreased range of motion, tenderness, pain and spasm. Thoracic back: She exhibits decreased range of motion, tenderness, pain and spasm. Lumbar back: She exhibits decreased range of motion, tenderness, bony tenderness and pain. She exhibits no swelling, no edema, no deformity, no laceration and normal pulse. Back:         Right upper arm: Normal.        Left upper arm: Normal.        Right forearm: Normal.        Left forearm: Normal.        Arms:       Right hand: Normal. Normal strength noted. Left hand: Decreased sensation noted. Decreased sensation is present in the medial distribution. Normal strength noted. Right upper leg: Normal.        Left upper leg: Normal.        Right lower leg: Normal.        Left lower leg: Normal.        Legs:       Right foot: Normal.        Left foot: Normal.        Feet:    Tender areas are indicated by numbered spot    Less tender    Numbness left flank             Neurological: She is alert and oriented to person, place, and time. She displays abnormal reflex. She displays no atrophy and no tremor. A sensory deficit is present. No cranial nerve deficit. She exhibits normal muscle tone. Gait abnormal. Coordination normal. She displays no Babinski's sign on the right side. She displays no Babinski's sign on the left side. Skin: Skin is warm, dry and intact.  No abrasion, no bruising, no ecchymosis, no laceration, no use-[place] - 12/20/17 OARRS PM&R, 02/20/18 OARRS PM&R, 03/07/18 OARRS PM&R, Urine Drug screen negative 02/06/17 PM&R--MED CONTRACT 2/6/17  Urine Drug Screen   4. Other osteoarthritis of spine, lumbar region     5. DDD (degenerative disc disease), lumbar     6. Insomnia secondary to chronic pain     7. Myalgia     8. Osteoarthritis of spine with radiculopathy, lumbar region     9. Cervical radicular pain     10. 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 Anxiety         I am also concerned by lifestyle and mood issues including:    Past Medical History:   Diagnosis Date    Anxiety     Asthma     Chronic back pain     Bilateral L5 S1 Radic on emg--surprisingly worse on the left than the right--pt's symptoms and her MRI show worse on the right    Depression     Fibromyalgia     Hyperlipidemia     Hypertension     Osteoarthritis     Type II diabetes mellitus, uncontrolled ([facility] [address]     Unspecified sleep apnea            Given their medication, chronic pain and lifestyle and medications they are at risk for :    Falls, constipation, addiction  Loss of livelyhood due to severe pain, debility, weight gain and  vitamin D deficiency    The patient was educated regarding proper diet, fitness routine, and regulatory restrictions concerning pain medications. Previous notes, comprehensive past medical, surgical, family history, and diagnostics were reviewed. Patient education and councelling were provided regarding off label use,treatment options and medication and injection risks. Current and old OARRS ([place] Automated Prescription Reporting System) records reviewed, all refills reviewed since last visit,  Behavioral agreement/MICHAELA regulations   and Toxicology screen was reviewed with patient and is up to date. There are current red flags-patient did not get a tox screening ordered 2 times this past year and taken her off of her opiates she stated the tox screen today.    They are making good progress regarding pain relief, they are performing at a functional level regarding activities of daily living, family interactions and psychological functioning, they're not having any adverse effects or side effects from the current medications, and I see no findings of aberrant drug taking or addiction related behaviors. The patient is aware that they have a chronic pain condition and they may require opiates dosing for life. All efforts will be made to wean to the lowest effective dose. Other therapies for pain have not been effective including nonopiate medications. Injections and exercises are only partially effective. A Rx for Narcan was offered to help prevent accidental overdose. RX Monitoring 5/31/2019   Attestation The Prescription Monitoring Report for this patient was reviewed today. Chronic Pain Routine Monitoring No signs of potential drug abuse or diversion identified: otherwise, see note documentation;Obtaining appropriate analgesic effect of treatment.;Possible medication side effects, risk of tolerance/dependence & alternative treatments discussed. Chronic Pain > 50 MEDD Obtained or confirmened \"Consent of Opioid Use\" on file.;Considered consultation with a specialist.;Re-evaluated the status of the patient's underlying condition causing pain. Chronic Pain > 80 MEDD -       Attestation: The Prescription Monitoring Report for this patient was reviewed today. (Dorcas Joe DO)  Chronic Pain Routine Monitoring: No signs of potential drug abuse or diversion identified: otherwise, see note documentation, Obtaining appropriate analgesic effect of treatment. , Possible medication side effects, risk of tolerance/dependence & alternative treatments discussed. (Dorcas Joe DO)       Patient is currently taking:       I have discontinued Aimee Villanueva's HYDROcodone-acetaminophen.  I am also having her maintain her pregabalin, ezetimibe, PARoxetine, metoprolol succinate, albuterol, aspirin, furosemide, albuterol sulfate HFA, insulin 70-30, misoprostol, lisinopril, and rosuvastatin. I also recommend the following Medications:    No orders of the defined types were placed in this encounter. Discontinue Norco because patient was not compliant with her tox screen     -which helps with pain and function. Otherwise, continue the current pain medications that I have prescibed. Radiologic:   Old films reviewed ,     I discussed results with patients. see Follow up plans below  For any new studies. Care Everywhere Updates:  requested and reviewed. No new issues noted. Electrodiagnostic:  Previous studies requested,     I discussed results with patient. See follow-up plans for new studies.         Labs:  Previous labs reviewed     Lab Results   Component Value Date     03/25/2019    K 4.0 03/25/2019    K 3.9 03/24/2019     03/25/2019    CO2 23 03/25/2019    BUN 19 03/25/2019    CREATININE 0.69 03/25/2019    CALCIUM 9.0 03/25/2019    LABALBU 3.9 03/24/2019    BILITOT 0.3 03/24/2019    ALKPHOS 124 03/24/2019    AST 40 03/24/2019    ALT 38 03/24/2019     Lab Results   Component Value Date    WBC 7.6 03/24/2019    RBC 4.70 03/24/2019    HGB 14.3 03/24/2019    HCT 41.8 03/24/2019    MCV 89.1 03/24/2019    MCH 30.3 03/24/2019    MCHC 34.1 03/24/2019    RDW 13.5 03/24/2019     03/24/2019    MPV 8.8 08/01/2015       Lab Results   Component Value Date    LABAMPH Neg 12/21/2017    BARBSCNU Neg 12/21/2017    LABBENZ Neg 12/21/2017    CANSU Neg 12/21/2017    COCAIMETSCRU Neg 12/21/2017    PHENCYCLIDINESCREENURINE Neg 72/34/8407    TRICYCLIC not available 26/96/3268    DSCOMMENT see below 12/21/2017       No results found for: CODEINE, MORPHINE, ACETYLMORPHI, OXYCODONE, NOROXYCODONE, NOROXYMU, HYDRCO, NORHYDU, HYDROMO, BUPREN, NORBUPRNOR, FENTA, NORFENT, MEPERIDINE, TAPENU, TAPOSULFUR, METHADONE, LABPROP, TRAM, AMPH, METHAMP, MDMA, ECMDA    Lab Results   Component Value Date    LABCREA 120.7 01/02/2018         , I discussed results with patient. See follow-up plans for new studies. Therapies:  HEP-gentle stretching and relaxation techniques-demonstrated with patient-they are to do them twice a day. They are also advised to make the following lifestyle changes:  Goals      Exercise 3x per week (30 min per time)      To limit narcotic dosing       HEMOGLOBIN A1C < 7      LDL CALC < 100      SOAPP-R GOAL LESS THAN 9      11/10/16 SCORE: 27-HIGH RISK     02/06/17 score: 22-high risk   04/11/17 score:32-high risk  06/12/17 score: 21-high risk  08/14/17 score: 18-moderate risk  10/16/17 score: 15-moderate risk  12/21/17 score: 16-moderate risk  5/11/2018 score: 18-high risk  8/15/18 score: 7- low risk  11/7/18 score: 18- moderate risk. 01/10/19 score:13- moderate risk  3/13/19 score:13 - moderate risk  5/31/19 score: 16- moderate risk             Stop Cigarette/Tobacco use      Weight < 140 lb (63.504 kg)           Injections or Epidurals:  Injection options were discussed. Schedule trigger points and right sciatic. Patient gave verbal consent to ordered injections. See follow-up plans for planned injections. Supplements:  Vitamin D with increased dosing during the rainy months,   Education was given on:   Dietary and Fitness--daily stretches and low carb diet-in chair Yoga when possible             Follow up with Primary Care Physician regarding their general medical needs. They are to follow up in 2 months to review medication, efficacy of injections, pill counts, OARRS check, SOAPPR assessment, review diagnostics, to review previous and future treatment plans and assess appropriateness for continued therapy.         New Diagnostics  Orders Placed This Encounter   Procedures    Urine Drug Screen       Isma Reza DO

## 2019-06-03 DIAGNOSIS — Z79.899 HIGH RISK MEDICATION USE: ICD-10-CM

## 2019-06-03 LAB
AMPHETAMINE SCREEN, URINE: ABNORMAL
BARBITURATE SCREEN URINE: ABNORMAL
BENZODIAZEPINE SCREEN, URINE: ABNORMAL
CANNABINOID SCREEN URINE: ABNORMAL
COCAINE METABOLITE SCREEN URINE: ABNORMAL
Lab: ABNORMAL
OPIATE SCREEN URINE: POSITIVE
PHENCYCLIDINE SCREEN URINE: ABNORMAL

## 2019-06-11 DIAGNOSIS — M79.10 MYALGIA: ICD-10-CM

## 2019-06-11 DIAGNOSIS — M47.26 OSTEOARTHRITIS OF SPINE WITH RADICULOPATHY, LUMBAR REGION: ICD-10-CM

## 2019-06-11 DIAGNOSIS — M54.10 DIABETIC RADICULOPATHY (HCC): ICD-10-CM

## 2019-06-11 DIAGNOSIS — G58.0 INTERCOSTAL NEUROPATHY: ICD-10-CM

## 2019-06-11 DIAGNOSIS — Z79.899 HIGH RISK MEDICATION USE: ICD-10-CM

## 2019-06-11 DIAGNOSIS — M54.17 LUMBOSACRAL RADICULOPATHY AT L5: Chronic | ICD-10-CM

## 2019-06-11 DIAGNOSIS — M54.12 CERVICAL RADICULAR PAIN: ICD-10-CM

## 2019-06-11 DIAGNOSIS — E11.49 DIABETIC RADICULOPATHY (HCC): ICD-10-CM

## 2019-06-11 RX ORDER — HYDROCODONE BITARTRATE AND ACETAMINOPHEN 5; 325 MG/1; MG/1
1 TABLET ORAL EVERY 8 HOURS PRN
Qty: 80 TABLET | Refills: 0 | Status: SHIPPED | OUTPATIENT
Start: 2019-06-11 | End: 2019-07-08 | Stop reason: SDUPTHER

## 2019-06-18 ENCOUNTER — CARE COORDINATION (OUTPATIENT)
Dept: CARE COORDINATION | Age: 74
End: 2019-06-18

## 2019-06-20 ENCOUNTER — CARE COORDINATION (OUTPATIENT)
Dept: CARE COORDINATION | Age: 74
End: 2019-06-20

## 2019-06-20 NOTE — LETTER
Janine Wake  [address]  [place] [id number]      Dear Phiilpp Wells,    I hope this letter finds you doing well! I just wanted to follow up with you from our last contact. I am sending you diabetic zone sheet to assist in knowing when to contact the doctor. I have included information on the walk in clinic. I hope you find this information helpful. Please look them over and let me know if you have any questions. You can reach me at the numbers listed below.            Sincerely,       Orquidea Weiner RN, BSN   [facility]) Physicians    Direct line: [phone]

## 2019-06-24 ENCOUNTER — TELEPHONE (OUTPATIENT)
Dept: PHARMACY | Facility: CLINIC | Age: 74
End: 2019-06-24

## 2019-06-24 NOTE — CARE COORDINATION
Ambulatory Care Coordination Note  6/20/2019   CM Risk Score: 4  Eduar Mortality Risk Score: 11    ACC: Laurice Dakins, RN    Summary Note: received call back from [facility] to enroll in care coordination. She is aware of elevated HbA1c and states she will see endocrinology Dr Anny Alex in July. She does test her blood sugar with results 118-200. She is receiving injection for back and leg pain. Discussed effect of steroid on blood sugar. Discussed hypoglycemia and she will need reinforcement. Scheduled appt with pcp for refill of meds per her request. Will refer to pharmacy and dietician. Will mail zone sheet and walk in clinic info  Diabetes Assessment    Medic Alert ID:  No  Meal Planning:  Avoidance of concentrated sweets   How often do you test your blood sugar?:  Daily   Do you have barriers with adherence to non-pharmacologic self-management interventions? (Nutrition/Exercise/Self-Monitoring):  Yes   Have you ever had to go to the ED for symptoms of low blood sugar?:  No       No patient-reported symptoms       and   Congestive Heart Failure Assessment       No patient-reported symptoms      Symptoms:      Symptom course:  stable         Ambulatory Care Coordination Assessment    Care Coordination Protocol  Program Enrollment:  Rising Risk  Referral from Primary Care Provider:  No  Week 1 - Initial Assessment     Do you have all of your prescriptions and are they filled?:  Yes  Barriers to medication adherence:  None  Are you able to afford your medications?:  Yes  How often do you have trouble taking your medications the way you have been told to take them?:  I always take them as prescribed. Do you have Home O2 Therapy?:  No      Ability to seek help/take action for Emergent Urgent situations i.e. fire, crime, inclement weather or health crisis. :  Independent  Ability to ambulate to restroom:  Independent  Ability handle personal hygeine needs (bathing/dressing/grooming):   Independent  Ability to manage Medications: Independent  Ability to prepare Food Preparation:  Independent  Ability to maintain home (clean home, laundry):  Needs Assistance  Ability to drive and/or has transportation:  Needs Assistance  Ability to do shopping:  Needs Assistance  Ability to manage finances: Independent  Is patient able to live independently?:  Yes     Current Housing:  Private Residence        Per the Fall Risk Screening, did the patient have 2 or more falls or 1 fall with injury in the past year?:  Yes  How often do you think you are about to fall and you do NOT fall? For example, you grab something to stabilize yourself or hold onto a wall/furniture?:  Occasionally  Use of a Mobility Aid:  Yes  Difficulty walking/impaired gait:  Yes  Issues with feet or shoes like numbness, edema, shoes not fitting:  No  Changes in vision, poor vision or poor lighting in environment:  No  Dizziness:  No  Other Fall Risk:  No     Frequent urination at night?:  No  Do you use rails/bars?:  Yes  Do you have a non-slip tub mat?:  Yes     Are you experiencing loss of meaning?:  No  Are you experiencing loss of hope and peace?:  No     Thinking about your patient's physical health needs, are there any symptoms or problems (risk indicators) you are unsure about that require further investigation?:  No identified areas of uncertainly or problems already being investigated   Are the patients physical health problems impacting on their mental well-being?:  No identified areas of concern   Are there any problems with your patients lifestyle behaviors (alcohol, drugs, diet, exercise) that are impacting on physical or mental well-being?:  No identified areas of concern   Do you have any other concerns about your patients mental well-being?  How would you rate their severity and impact on the patient?:  Mild problems - don't interfere with function   How would you rate their home environment in terms of safety and stability (including domestic violence, insecure housing, neighbor harassment)?:  Consistently safe, supportive, stable, no identified problems   How do daily activities impact on the patient's well-being? (include current or anticipated unemployment, work, caregiving, access to transportation or other):  No identified problems or perceived positive benefits   How would you rate their social network (family, work, friends)?:  Adequate participation with social networks   How would you rate their financial resources (including ability to afford all required medical care)?:  Financially secure, resources adequate, no identified problems   How wells does the patient now understand their health and well-being (symptoms, signs or risk factors) and what they need to do to manage their health?:  Reasonable to good understanding and already engages in managing health or is willing to undertake better management   How well do you think your patient can engage in healthcare discussions? (Barriers include language, deafness, aphasia, alcohol or drug problems, learning difficulties, concentration):  Clear and open communication, no identified barriers   Do other services need to be involved to help this patient?:  Other care/services not in place and required   Are current services involved with this patient well-coordinated? (Include coordination with other services you are now recommendation):  Required care/services missing and/or fragmented   Suggested Interventions and Community Resources  Fall Risk Prevention: In Process Other Services or Interventions:  6/24/2019 will mail walk in clinic    Pharmacist:  Completed   Registered Dietician:  Completed   Zone Management Tools:  Completed         Set up/Review Goals, Set up/Review an Education Plan              Prior to Admission medications    Medication Sig Start Date End Date Taking?  Authorizing Provider   HYDROcodone-acetaminophen (NORCO) 5-325 MG per tablet Take 1 tablet by mouth every 8 hours as needed for Pain (max of 3 per day) for up to 30 days. Max of 80 per month 6/11/19 7/11/19  Oralia Collado DO   rosuvastatin (CRESTOR) 40 MG tablet Take 1 tablet by mouth every evening 5/23/19   Mónica Vee MD   lisinopril (PRINIVIL;ZESTRIL) 10 MG tablet TAKE 1 TABLET BY MOUTH TWICE DAILY 5/22/19   Jackie Mcclelland MD   misoprostol (CYTOTEC) 200 MCG tablet Take 1 tablet by mouth every 12 hours for 3 days 4/17/19 4/20/19  Stephie PlDO pippa   insulin 70-30 (HUMULIN 70/30) (70-30) 100 UNIT per ML injection vial Inject 60 units bid please give 4 vials for a 30 day supply 4/9/19   Sarah Maxwell MD   albuterol sulfate HFA (PROVENTIL HFA) 108 (90 Base) MCG/ACT inhaler Inhale 2 puffs into the lungs every 6 hours as needed for Wheezing 4/1/19   Mónica Vee MD   aspirin 81 MG EC tablet Take 1 tablet by mouth daily 3/25/19   Jackie Mcclelland MD   furosemide (LASIX) 40 MG tablet Take 1 tablet by mouth daily 3/25/19   Jackie Mcclelland MD   albuterol (PROVENTIL) (5 MG/ML) 0.5% nebulizer solution Take 0.5 mLs by nebulization every 6 hours as needed for Wheezing 3/24/19   Kamini Ortiz MD   ezetimibe (ZETIA) 10 MG tablet Take 1 tablet by mouth daily 2/12/19   Mónica Vee MD   PARoxetine (PAXIL) 40 MG tablet TAKE 1 TABLET BY MOUTH ONCE DAILY IN THE MORNING 2/12/19   Mónica Vee MD   metoprolol succinate (TOPROL XL) 200 MG extended release tablet 2 po q day 2/12/19   Mónica Vee MD   pregabalin (LYRICA) 25 MG capsule Take 1 capsule by mouth 3 times daily for 90 days. . 11/7/18 2/12/19  Errol Vidal DO       Future Appointments   Date Time Provider Department Center   6/25/2019 10:45 AM Errol Vidal DO [facility] AT [place]   7/2/2019 11:15 AM Mónica Vee MD [facility] AT [place]   7/9/2019 11:30 AM Stephie López DO [facility]   8/7/2019  9:45 AM Teodora Neri MD [facility]

## 2019-06-25 ENCOUNTER — PROCEDURE VISIT (OUTPATIENT)
Dept: PHYSICAL MEDICINE AND REHAB | Age: 74
End: 2019-06-25
Payer: MEDICARE

## 2019-06-25 ENCOUNTER — CARE COORDINATION (OUTPATIENT)
Dept: CARE COORDINATION | Age: 74
End: 2019-06-25

## 2019-06-25 DIAGNOSIS — M54.32 SCIATICA OF LEFT SIDE: Primary | ICD-10-CM

## 2019-06-25 PROCEDURE — 64445 NJX AA&/STRD SCIATIC NRV IMG: CPT | Performed by: PHYSICAL MEDICINE & REHABILITATION

## 2019-06-25 PROCEDURE — 76942 ECHO GUIDE FOR BIOPSY: CPT | Performed by: PHYSICAL MEDICINE & REHABILITATION

## 2019-06-27 ENCOUNTER — CARE COORDINATION (OUTPATIENT)
Dept: CARE COORDINATION | Age: 74
End: 2019-06-27

## 2019-06-27 NOTE — CARE COORDINATION
Sent Pt education:       CHF Zone Tool                          What is Heart Failure? My Heart Failure Plan Of Care                          How Do I Follow A Healthy Diet?

## 2019-06-27 NOTE — LETTER
Jacquelin Gonsalez  [place] [address]  [address] [facility] [address]          Dear Vasyl Roldan,    I hope this letter finds you doing well! I am sending you a few patient education materials that I feel would be useful to you. Please look them over and let me know if you have any questions and/or would like to go over the materials over the phone. You can contact me at any of the numbers provided below.        Sincerely,    Jasmina Rodrigez, [address] [place] [address]  O: [phone]  C: [phone]

## 2019-07-02 ENCOUNTER — OFFICE VISIT (OUTPATIENT)
Dept: FAMILY MEDICINE CLINIC | Age: 74
End: 2019-07-02
Payer: MEDICARE

## 2019-07-02 ENCOUNTER — CARE COORDINATION (OUTPATIENT)
Dept: CARE COORDINATION | Age: 74
End: 2019-07-02

## 2019-07-02 VITALS
WEIGHT: 134.8 LBS | OXYGEN SATURATION: 98 % | TEMPERATURE: 97.5 F | DIASTOLIC BLOOD PRESSURE: 70 MMHG | BODY MASS INDEX: 27.17 KG/M2 | HEART RATE: 107 BPM | SYSTOLIC BLOOD PRESSURE: 134 MMHG | HEIGHT: 59 IN | RESPIRATION RATE: 10 BRPM

## 2019-07-02 DIAGNOSIS — F41.9 ANXIETY: ICD-10-CM

## 2019-07-02 DIAGNOSIS — K59.00 CONSTIPATION, UNSPECIFIED CONSTIPATION TYPE: ICD-10-CM

## 2019-07-02 DIAGNOSIS — F33.42 MAJOR DEPRESSIVE DISORDER, RECURRENT, IN FULL REMISSION (HCC): ICD-10-CM

## 2019-07-02 DIAGNOSIS — Z91.81 AT HIGH RISK FOR FALLS: ICD-10-CM

## 2019-07-02 DIAGNOSIS — I10 ESSENTIAL HYPERTENSION: ICD-10-CM

## 2019-07-02 DIAGNOSIS — I10 ESSENTIAL HYPERTENSION: Primary | ICD-10-CM

## 2019-07-02 LAB
ALBUMIN SERPL-MCNC: 4.6 G/DL (ref 3.5–4.6)
ALP BLD-CCNC: 117 U/L (ref 40–130)
ALT SERPL-CCNC: 22 U/L (ref 0–33)
ANION GAP SERPL CALCULATED.3IONS-SCNC: 15 MEQ/L (ref 9–15)
AST SERPL-CCNC: 28 U/L (ref 0–35)
BILIRUB SERPL-MCNC: <0.2 MG/DL (ref 0.2–0.7)
BUN BLDV-MCNC: 22 MG/DL (ref 8–23)
CALCIUM SERPL-MCNC: 10.1 MG/DL (ref 8.5–9.9)
CHLORIDE BLD-SCNC: 106 MEQ/L (ref 95–107)
CO2: 21 MEQ/L (ref 20–31)
CREAT SERPL-MCNC: 0.94 MG/DL (ref 0.5–0.9)
GFR AFRICAN AMERICAN: >60
GFR NON-AFRICAN AMERICAN: 58.1
GLOBULIN: 2.5 G/DL (ref 2.3–3.5)
GLUCOSE BLD-MCNC: 166 MG/DL (ref 70–99)
POTASSIUM SERPL-SCNC: 4 MEQ/L (ref 3.4–4.9)
SODIUM BLD-SCNC: 142 MEQ/L (ref 135–144)
TOTAL PROTEIN: 7.1 G/DL (ref 6.3–8)

## 2019-07-02 PROCEDURE — 99213 OFFICE O/P EST LOW 20 MIN: CPT | Performed by: FAMILY MEDICINE

## 2019-07-02 PROCEDURE — 4040F PNEUMOC VAC/ADMIN/RCVD: CPT | Performed by: FAMILY MEDICINE

## 2019-07-02 PROCEDURE — 3017F COLORECTAL CA SCREEN DOC REV: CPT | Performed by: FAMILY MEDICINE

## 2019-07-02 PROCEDURE — 3014F SCREEN MAMMO DOC REV: CPT | Performed by: FAMILY MEDICINE

## 2019-07-02 PROCEDURE — G8417 CALC BMI ABV UP PARAM F/U: HCPCS | Performed by: FAMILY MEDICINE

## 2019-07-02 PROCEDURE — G8400 PT W/DXA NO RESULTS DOC: HCPCS | Performed by: FAMILY MEDICINE

## 2019-07-02 PROCEDURE — G8427 DOCREV CUR MEDS BY ELIG CLIN: HCPCS | Performed by: FAMILY MEDICINE

## 2019-07-02 PROCEDURE — 4004F PT TOBACCO SCREEN RCVD TLK: CPT | Performed by: FAMILY MEDICINE

## 2019-07-02 PROCEDURE — 1123F ACP DISCUSS/DSCN MKR DOCD: CPT | Performed by: FAMILY MEDICINE

## 2019-07-02 PROCEDURE — 1090F PRES/ABSN URINE INCON ASSESS: CPT | Performed by: FAMILY MEDICINE

## 2019-07-02 RX ORDER — OMEPRAZOLE 20 MG/1
20 CAPSULE, DELAYED RELEASE ORAL 2 TIMES DAILY
Qty: 60 CAPSULE | Status: CANCELLED | OUTPATIENT
Start: 2019-07-02

## 2019-07-02 RX ORDER — PAROXETINE HYDROCHLORIDE 40 MG/1
TABLET, FILM COATED ORAL
Qty: 30 TABLET | Refills: 5 | Status: SHIPPED | OUTPATIENT
Start: 2019-07-02 | End: 2019-10-30 | Stop reason: SDUPTHER

## 2019-07-02 ASSESSMENT — ENCOUNTER SYMPTOMS
BACK PAIN: 1
BLOOD IN STOOL: 0

## 2019-07-02 NOTE — CARE COORDINATION
[facility]ah[facility] for follow up outreach regarding Dietitian referral. Left voicemail providing call back number. No additional outreach attempt scheduled at this time. RD will continue to be available to assist with nutrition needs as appropriate/with pt return call.          Vani Villegas MS, [address] [place] [address],   [phone]

## 2019-07-07 ENCOUNTER — APPOINTMENT (OUTPATIENT)
Dept: GENERAL RADIOLOGY | Age: 74
End: 2019-07-07
Payer: MEDICARE

## 2019-07-07 ENCOUNTER — APPOINTMENT (OUTPATIENT)
Dept: CT IMAGING | Age: 74
End: 2019-07-07
Payer: MEDICARE

## 2019-07-07 ENCOUNTER — HOSPITAL ENCOUNTER (EMERGENCY)
Age: 74
Discharge: HOME | End: 2019-07-07
Payer: MEDICARE

## 2019-07-07 VITALS
OXYGEN SATURATION: 100 % | HEIGHT: 62 IN | SYSTOLIC BLOOD PRESSURE: 162 MMHG | TEMPERATURE: 98 F | RESPIRATION RATE: 18 BRPM | HEART RATE: 106 BPM | DIASTOLIC BLOOD PRESSURE: 87 MMHG | WEIGHT: 135 LBS | BODY MASS INDEX: 24.84 KG/M2

## 2019-07-07 DIAGNOSIS — E16.2 HYPOGLYCEMIA: ICD-10-CM

## 2019-07-07 DIAGNOSIS — R55 SYNCOPE AND COLLAPSE: Primary | ICD-10-CM

## 2019-07-07 LAB
ALBUMIN SERPL-MCNC: 4.7 G/DL (ref 3.5–4.6)
ALP BLD-CCNC: 125 U/L (ref 40–130)
ALT SERPL-CCNC: 29 U/L (ref 0–33)
AMPHETAMINE SCREEN, URINE: NORMAL
ANION GAP SERPL CALCULATED.3IONS-SCNC: 14 MEQ/L (ref 9–15)
AST SERPL-CCNC: 38 U/L (ref 0–35)
BACTERIA: ABNORMAL /HPF
BARBITURATE SCREEN URINE: NORMAL
BASOPHILS ABSOLUTE: 0.1 K/UL (ref 0–0.2)
BASOPHILS RELATIVE PERCENT: 1.3 %
BENZODIAZEPINE SCREEN, URINE: NORMAL
BILIRUB SERPL-MCNC: 0.3 MG/DL (ref 0.2–0.7)
BILIRUBIN URINE: NEGATIVE
BLOOD, URINE: NEGATIVE
BUN BLDV-MCNC: 13 MG/DL (ref 8–23)
CALCIUM SERPL-MCNC: 10.2 MG/DL (ref 8.5–9.9)
CANNABINOID SCREEN URINE: NORMAL
CHLORIDE BLD-SCNC: 101 MEQ/L (ref 95–107)
CK MB: 10.1 NG/ML (ref 0–3.8)
CLARITY: CLEAR
CO2: 26 MEQ/L (ref 20–31)
COCAINE METABOLITE SCREEN URINE: NORMAL
COLOR: YELLOW
CREAT SERPL-MCNC: 0.85 MG/DL (ref 0.5–0.9)
CREATINE KINASE-MB INDEX: 1.4 % (ref 0–3.5)
EOSINOPHILS ABSOLUTE: 0.2 K/UL (ref 0–0.7)
EOSINOPHILS RELATIVE PERCENT: 2.1 %
EPITHELIAL CELLS, UA: ABNORMAL /HPF (ref 0–5)
ETHANOL PERCENT: NORMAL G/DL
ETHANOL: <10 MG/DL (ref 0–0.08)
GFR AFRICAN AMERICAN: >60
GFR NON-AFRICAN AMERICAN: >60
GLOBULIN: 3.2 G/DL (ref 2.3–3.5)
GLUCOSE BLD-MCNC: 62 MG/DL (ref 70–99)
GLUCOSE BLD-MCNC: 71 MG/DL (ref 60–115)
GLUCOSE BLD-MCNC: 85 MG/DL (ref 60–115)
GLUCOSE URINE: NEGATIVE MG/DL
HCT VFR BLD CALC: 29.5 % (ref 37–47)
HEMOGLOBIN: 10.3 G/DL (ref 12–16)
HYALINE CASTS: ABNORMAL /HPF (ref 0–5)
KETONES, URINE: NEGATIVE MG/DL
LACTIC ACID: 1.4 MMOL/L (ref 0.5–2.2)
LEUKOCYTE ESTERASE, URINE: ABNORMAL
LYMPHOCYTES ABSOLUTE: 1.9 K/UL (ref 1–4.8)
LYMPHOCYTES RELATIVE PERCENT: 22.9 %
Lab: NORMAL
MAGNESIUM: 2 MG/DL (ref 1.7–2.4)
MCH RBC QN AUTO: 27.9 PG (ref 27–31.3)
MCHC RBC AUTO-ENTMCNC: 34.8 % (ref 33–37)
MCV RBC AUTO: 80.2 FL (ref 82–100)
MONOCYTES ABSOLUTE: 0.5 K/UL (ref 0.2–0.8)
MONOCYTES RELATIVE PERCENT: 5.6 %
NEUTROPHILS ABSOLUTE: 5.6 K/UL (ref 1.4–6.5)
NEUTROPHILS RELATIVE PERCENT: 68.1 %
NITRITE, URINE: NEGATIVE
OPIATE SCREEN URINE: NORMAL
PDW BLD-RTO: 15.9 % (ref 11.5–14.5)
PERFORMED ON: NORMAL
PERFORMED ON: NORMAL
PH UA: 7.5 (ref 5–9)
PHENCYCLIDINE SCREEN URINE: NORMAL
PLATELET # BLD: 448 K/UL (ref 130–400)
POTASSIUM SERPL-SCNC: 4 MEQ/L (ref 3.4–4.9)
PROTEIN UA: NEGATIVE MG/DL
RBC # BLD: 3.68 M/UL (ref 4.2–5.4)
RBC UA: ABNORMAL /HPF (ref 0–2)
SODIUM BLD-SCNC: 141 MEQ/L (ref 135–144)
SPECIFIC GRAVITY UA: 1.01 (ref 1–1.03)
TOTAL CK: 739 U/L (ref 0–170)
TOTAL PROTEIN: 7.9 G/DL (ref 6.3–8)
TROPONIN: <0.01 NG/ML (ref 0–0.01)
URINE REFLEX TO CULTURE: YES
UROBILINOGEN, URINE: 0.2 E.U./DL
WBC # BLD: 8.2 K/UL (ref 4.8–10.8)
WBC UA: ABNORMAL /HPF (ref 0–5)

## 2019-07-07 PROCEDURE — 82550 ASSAY OF CK (CPK): CPT

## 2019-07-07 PROCEDURE — 85025 COMPLETE CBC W/AUTO DIFF WBC: CPT

## 2019-07-07 PROCEDURE — 99291 CRITICAL CARE FIRST HOUR: CPT

## 2019-07-07 PROCEDURE — 80053 COMPREHEN METABOLIC PANEL: CPT

## 2019-07-07 PROCEDURE — 2580000003 HC RX 258: Performed by: PHYSICIAN ASSISTANT

## 2019-07-07 PROCEDURE — 81001 URINALYSIS AUTO W/SCOPE: CPT

## 2019-07-07 PROCEDURE — 80307 DRUG TEST PRSMV CHEM ANLYZR: CPT

## 2019-07-07 PROCEDURE — 84484 ASSAY OF TROPONIN QUANT: CPT

## 2019-07-07 PROCEDURE — 82553 CREATINE MB FRACTION: CPT

## 2019-07-07 PROCEDURE — 87086 URINE CULTURE/COLONY COUNT: CPT

## 2019-07-07 PROCEDURE — 36415 COLL VENOUS BLD VENIPUNCTURE: CPT

## 2019-07-07 PROCEDURE — 83605 ASSAY OF LACTIC ACID: CPT

## 2019-07-07 PROCEDURE — 83735 ASSAY OF MAGNESIUM: CPT

## 2019-07-07 PROCEDURE — G0480 DRUG TEST DEF 1-7 CLASSES: HCPCS

## 2019-07-07 PROCEDURE — 71045 X-RAY EXAM CHEST 1 VIEW: CPT

## 2019-07-07 PROCEDURE — 96374 THER/PROPH/DIAG INJ IV PUSH: CPT

## 2019-07-07 PROCEDURE — 70450 CT HEAD/BRAIN W/O DYE: CPT

## 2019-07-07 RX ORDER — DEXTROSE MONOHYDRATE 25 G/50ML
12.5 INJECTION, SOLUTION INTRAVENOUS ONCE
Status: COMPLETED | OUTPATIENT
Start: 2019-07-07 | End: 2019-07-07

## 2019-07-07 RX ADMIN — DEXTROSE 50 % IN WATER (D50W) INTRAVENOUS SYRINGE 12.5 G: at 14:50

## 2019-07-07 ASSESSMENT — ENCOUNTER SYMPTOMS
SHORTNESS OF BREATH: 0
DIARRHEA: 0
APNEA: 0
COLOR CHANGE: 0
EYE PAIN: 0
ALLERGIC/IMMUNOLOGIC NEGATIVE: 1
VOMITING: 1
ABDOMINAL PAIN: 0
TROUBLE SWALLOWING: 0
NAUSEA: 1

## 2019-07-07 NOTE — ED PROVIDER NOTES
are normal. No scleral icterus. Neck: Normal range of motion. Neck supple. No tracheal deviation present. Cardiovascular: Normal rate, normal heart sounds and intact distal pulses. Pulmonary/Chest: Effort normal and breath sounds normal. No respiratory distress. Abdominal: Soft. Bowel sounds are normal. She exhibits no distension. Musculoskeletal: Normal range of motion. 4/5 strength equally through all extremities   Neurological: She is alert and oriented to person, place, and time. No cranial nerve deficit. She exhibits normal muscle tone. Skin: Skin is warm and dry. No rash noted. She is not diaphoretic. No erythema. Psychiatric: She has a normal mood and affect. Her behavior is normal. Judgment and thought content normal.   Nursing note and vitals reviewed. DIAGNOSTIC RESULTS     EKG: All EKG's are interpreted by the Emergency Department Physician who either signs or Co-signsthis chart in the absence of a cardiologist.        RADIOLOGY:   Deandra Minniein such as CT, Ultrasound and MRI are read by the radiologist. Rachael Brody radiographic images are visualized and preliminarily interpreted by the emergency physician with the below findings:    CT head- Neg  CXR- Neg    Interpretation per the Radiologist below, if available at the time ofthis note:    CT HEAD WO CONTRAST   Final Result      No acute intracranial process. Generalized parenchymal volume loss and nonspecific white matter findings most compatible with chronic small vessel ischemic changes in a patient of this age. All CT scans at this facility use dose modulation, iterative reconstruction, and/or weight based dosing when appropriate to reduce radiation dose to as low as reasonably achievable.       XR CHEST PORTABLE    (Results Pending)         ED BEDSIDE ULTRASOUND:   Performed by ED Physician - none    LABS:  Labs Reviewed   COMPREHENSIVE METABOLIC PANEL - Abnormal; Notable for the following components:       Result Value    Glucose 62 (*)     Calcium 10.2 (*)     Alb 4.7 (*)     AST 38 (*)     All other components within normal limits   CBC WITH AUTO DIFFERENTIAL - Abnormal; Notable for the following components:    RBC 3.68 (*)     Hemoglobin 10.3 (*)     Hematocrit 29.5 (*)     MCV 80.2 (*)     RDW 15.9 (*)     Platelets 642 (*)     All other components within normal limits   CK - Abnormal; Notable for the following components: Total  (*)     All other components within normal limits   URINE RT REFLEX TO CULTURE - Abnormal; Notable for the following components:    Leukocyte Esterase, Urine TRACE (*)     All other components within normal limits   CKMB & RELATIVE PERCENT - Abnormal; Notable for the following components:    CK-MB 10.1 (*)     All other components within normal limits   URINE CULTURE   MAGNESIUM   LACTIC ACID, PLASMA   URINE DRUG SCREEN   ETHANOL   TROPONIN   MICROSCOPIC URINALYSIS   POCT GLUCOSE       All other labs were within normal range or not returned as of this dictation. EMERGENCY DEPARTMENT COURSE and DIFFERENTIAL DIAGNOSIS/MDM:   Vitals:    Vitals:    07/07/19 1418   BP: (!) 162/87   Pulse: 106   Resp: 18   Temp: 98 °F (36.7 °C)   TempSrc: Oral   SpO2: 100%   Weight: 135 lb (61.2 kg)   Height: 5' 2\" (1.575 m)       Patient presented to ER following syncopal episode with confusion. Symptoms rapidly improving and not a candidate for TPA. Had a blood sugar of 60 and was given a half amp of D50 with complete resolution of all symptoms. CT of the head, laboratory testing is grossly unremarkable. Patient does admit that she does not eat like she is supposed to and recently had insulin increased. I offered the patient admission for further evaluation of the syncope and she declines.   Patient will be discharged and advised that if any recurrence of this is to happen she is to return to the emergency room immediately    MDM      [address] time

## 2019-07-07 NOTE — ED TRIAGE NOTES
Patient arrived from home via ems with complaint of altered mental status with lethargic/confusion x2 hours. Patient A&OX2 upon arrival. Patient has clear speech, but delayed. Patient able to move all limbs.

## 2019-07-08 ENCOUNTER — CARE COORDINATION (OUTPATIENT)
Dept: CARE COORDINATION | Age: 74
End: 2019-07-08

## 2019-07-08 DIAGNOSIS — Z79.899 HIGH RISK MEDICATION USE: ICD-10-CM

## 2019-07-08 DIAGNOSIS — E11.49 DIABETIC RADICULOPATHY (HCC): ICD-10-CM

## 2019-07-08 DIAGNOSIS — M47.26 OSTEOARTHRITIS OF SPINE WITH RADICULOPATHY, LUMBAR REGION: ICD-10-CM

## 2019-07-08 DIAGNOSIS — G58.0 INTERCOSTAL NEUROPATHY: ICD-10-CM

## 2019-07-08 DIAGNOSIS — M54.17 LUMBOSACRAL RADICULOPATHY AT L5: Chronic | ICD-10-CM

## 2019-07-08 DIAGNOSIS — M54.12 CERVICAL RADICULAR PAIN: ICD-10-CM

## 2019-07-08 DIAGNOSIS — M79.10 MYALGIA: ICD-10-CM

## 2019-07-08 DIAGNOSIS — M54.10 DIABETIC RADICULOPATHY (HCC): ICD-10-CM

## 2019-07-08 RX ORDER — HYDROCODONE BITARTRATE AND ACETAMINOPHEN 5; 325 MG/1; MG/1
1 TABLET ORAL EVERY 8 HOURS PRN
Qty: 80 TABLET | Refills: 0 | Status: SHIPPED | OUTPATIENT
Start: 2019-07-10 | End: 2019-08-22 | Stop reason: SDUPTHER

## 2019-07-09 ENCOUNTER — TELEPHONE (OUTPATIENT)
Dept: FAMILY MEDICINE CLINIC | Age: 74
End: 2019-07-09

## 2019-07-09 LAB — URINE CULTURE, ROUTINE: NORMAL

## 2019-07-11 ENCOUNTER — TELEPHONE (OUTPATIENT)
Dept: FAMILY MEDICINE CLINIC | Age: 74
End: 2019-07-11

## 2019-07-12 ENCOUNTER — SCHEDULED TELEPHONE ENCOUNTER (OUTPATIENT)
Dept: PHARMACY | Facility: CLINIC | Age: 74
End: 2019-07-12

## 2019-07-19 ENCOUNTER — CARE COORDINATION (OUTPATIENT)
Dept: CARE COORDINATION | Age: 74
End: 2019-07-19

## 2019-07-23 NOTE — PATIENT INSTRUCTIONS
Patient Education        Hypoglycemia: Care Instructions  Your Care Instructions    Hypoglycemia means that your blood sugar is low and your body is not getting enough fuel. Some people get low blood sugar from not eating often enough. Some medicines to treat diabetes can cause low blood sugar. People who have had surgery on their stomachs or intestines may get hypoglycemia. Problems with the pancreas, kidneys, or liver also can cause low blood sugar. A snack or drink with sugar in it will raise your blood sugar and should ease your symptoms right away. Your doctor may recommend that you change or stop your medicines until you can get your blood sugar levels under control. In the long run, you may need to change your diet and eating habits so that you get enough fuel for your body throughout the day. Follow-up care is a key part of your treatment and safety. Be sure to make and go to all appointments, and call your doctor if you are having problems. It's also a good idea to know your test results and keep a list of the medicines you take. How can you care for yourself at home? · Learn to recognize the early signs of low blood sugar. Signs include:  ? Nausea. ? Hunger. ? Feeling nervous, irritable, or shaky. ? Cold, clammy, wet skin. ? Sweating (when you are not exercising). ? A fast heartbeat.  ? Numbness or tingling of the fingertips or lips. · If you feel an episode of low blood sugar coming on, drink fruit juice or sugared (not diet) soda, or eat sugar in the form of candy, cubes, or tablets. [employer] are another [employer]. · Eat small, frequent meals so that you do not get too hungry between meals. · Balance extra exercise with eating more. · Keep a written record of your low blood sugar episodes, including when you last ate and what you ate, so that you can learn what causes your blood sugar to drop.   · Make sure your family, friends, and coworkers know the symptoms of low blood sugar and sugar is low until it drops very low. · If your blood sugar level drops below 70 (mild low blood sugar), you may feel tired, anxious, dizzy, weak, shaky, or sweaty. You may have a fast heartbeat or blurry vision. · If your blood sugar level continues to drop (usually below 40), your behavior may change. You may feel more irritable. You may find it hard to concentrate or talk. And you may feel unsteady when you stand or walk. You may become too weak or confused to eat something with sugar to raise your blood sugar level. · If your blood sugar level drops very low (usually below 20), you may pass out (lose consciousness). Or you may have a seizure or stroke. If you have symptoms of severe low blood sugar, you need to get medical care right away. If you had a low blood sugar level during the night, you may wake up tired or with a headache. Or you may sweat so much during the night that your pajamas or sheets are damp when you wake up. How is low blood sugar treated? You can treat low blood sugar by eating or drinking something that has 15 grams of carbohydrate. These should be quick-sugar foods. Check your blood sugar level again 15 minutes after having a quick-sugar food to make sure your level is getting back to your target range. Here are examples of quick-sugar foods that have 15 grams of carbohydrate:  · 3 to 4 glucose tablets  · 1 tube of glucose gel  · Hard candy (such as 3 Jolly Ranchers or 5 to 7 Life Savers)  · 1 tablespoon honey  · 2 tablespoons of raisins  · ½ cup to ¾ cup (4 to 6 ounces) of fruit juice or regular (not diet) soda  · 1 tablespoon of sugar  · 1 cup of fat-free milk  If you have problems with severe low blood sugar, someone else may have to give you a shot of glucagon. This is a hormone that raises blood sugar levels quickly. How can you prevent low blood sugar? You can take steps to prevent low blood sugar. · Follow your treatment plan.  Take your insulin or other diabetes medicine exactly as your doctor prescribed it. Talk with your doctor if you're having low blood sugar often. Your medicine may need to be adjusted if it's causing your low blood sugar. · Check your blood sugar levels often. This helps you find early changes before an emergency happens. · Keep a quick-sugar food with you in case your blood sugar level drops low. · Eat small meals more often so that you don't get too hungry between meals. Don't skip meals. · Balance extra exercise with eating more. Check your blood sugar and learn how it changes after exercise. If your blood sugar stays at a normal level, you may not need to eat after you exercise. · Limit how much alcohol you drink. Alcohol can make low blood sugar go even lower. Don't drink alcohol if you have problems recognizing the early signs of low blood sugar. · Keep a diary of your symptoms. This helps you learn when changes in your body may signal low blood sugar. And keep track of how often you have low blood sugar, including when you last ate and what you ate. This will help you learn what causes your blood sugar to drop. · Learn about diabetes and low blood sugar. Support groups or a diabetes education center can help you understand how medicines, diet, and exercise affect your blood sugar levels. Since low blood sugar levels can quickly become an emergency, be sure to wear medical alert jewelry, such as a medical alert bracelet. This is to let people know you have diabetes so they can get help for you. You can buy this at most drugstores. And make sure your family, friends, and coworkers know the symptoms of low blood sugar. Teach them what to do to get your sugar level up. Follow-up care is a key part of your treatment and safety. Be sure to make and go to all appointments, and call your doctor if you are having problems. It's also a good idea to know your test results and keep a list of the medicines you take. Where can you learn more?   Go to

## 2019-08-05 ENCOUNTER — CARE COORDINATION (OUTPATIENT)
Dept: CARE COORDINATION | Age: 74
End: 2019-08-05

## 2019-08-07 NOTE — CARE COORDINATION
Ambulatory Care Coordination Note  8/6/2019  CM Risk Score: 8  Charlson 10 Year Mortality Risk Score: 100%     ACC: Jalil Foss RN    Summary Note: call to jeannie to discuss health coaching. She reports blood sugar range 102-182. Discussion of hypoglycemia and she has not had any episodes. Will mail information from [employer] and [address]. She continues with co fatigue. Reports compliance with medication. Diabetes Assessment    Medic Alert ID:  No  Meal Planning:  Avoidance of concentrated sweets   How often do you test your blood sugar?:  Daily   Do you have barriers with adherence to non-pharmacologic self-management interventions? (Nutrition/Exercise/Self-Monitoring):  Yes   Have you ever had to go to the ED for symptoms of low blood sugar?:  No       No patient-reported symptoms       and   Congestive Heart Failure Assessment       No patient-reported symptoms      Symptoms:   None:  Yes      Symptom course:  stable             Care Coordination Interventions    Program Enrollment:  Rising Risk  Referral from Primary Care Provider:  No  Suggested Interventions and Community Resources  Fall Risk Prevention: In Process  Pharmacist:  Completed (Comment: 6/24/2019 in basket)  Registered Dietician:  Completed (Comment: 6/24/2019)  Zone Management Tools:  Completed (Comment: 6/24/2019 mail dm)  Other Services or Interventions:  6/24/2019 will mail walk in clinic          Goals Addressed                 This Visit's Progress     Conditions and Symptoms   No change     I will follow my Zone Management tool to seek urgent or emergent care. I will notify my provider of any symptoms that indicate a worsening of my condition. Barriers: lack of education  Plan for overcoming my barriers: care coordination , pharmacy and dietary referrals  Confidence: 7/10  Anticipated Goal Completion Date: 9/20/2019              Prior to Admission medications    Medication Sig Start Date End Date Taking?  Authorizing Provider

## 2019-08-08 NOTE — PATIENT INSTRUCTIONS
Patient Education        Hypoglycemia: Care Instructions  Your Care Instructions    Hypoglycemia means that your blood sugar is low and your body is not getting enough fuel. Some people get low blood sugar from not eating often enough. Some medicines to treat diabetes can cause low blood sugar. People who have had surgery on their stomachs or intestines may get hypoglycemia. Problems with the pancreas, kidneys, or liver also can cause low blood sugar. A snack or drink with sugar in it will raise your blood sugar and should ease your symptoms right away. Your doctor may recommend that you change or stop your medicines until you can get your blood sugar levels under control. In the long run, you may need to change your diet and eating habits so that you get enough fuel for your body throughout the day. Follow-up care is a key part of your treatment and safety. Be sure to make and go to all appointments, and call your doctor if you are having problems. It's also a good idea to know your test results and keep a list of the medicines you take. How can you care for yourself at home? · Learn to recognize the early signs of low blood sugar. Signs include:  ? Nausea. ? Hunger. ? Feeling nervous, irritable, or shaky. ? Cold, clammy, wet skin. ? Sweating (when you are not exercising). ? A fast heartbeat.  ? Numbness or tingling of the fingertips or lips. · If you feel an episode of low blood sugar coming on, drink fruit juice or sugared (not diet) soda, or eat sugar in the form of candy, cubes, or tablets. [employer] are another [employer]. · Eat small, frequent meals so that you do not get too hungry between meals. · Balance extra exercise with eating more. · Keep a written record of your low blood sugar episodes, including when you last ate and what you ate, so that you can learn what causes your blood sugar to drop.   · Make sure your family, friends, and coworkers know the symptoms of low blood sugar and know what to do to get your sugar level up. · Wear medical alert jewelry that lists your condition. You can buy this at most drugstores. When should you call for help? Call 911 anytime you think you may need emergency care. For example, call if:    · You passed out (lost consciousness).     · You are confused or cannot think clearly.     · Your blood sugar is very high or very low.    Watch closely for changes in your health, and be sure to contact your doctor if:    · Your blood sugar stays outside the level your doctor set for you.     · You have any problems. Where can you learn more? Go to [email or web address] and sign in to your [employer] account. Enter A803 in the DataEmail Group box to learn more about \"Hypoglycemia: Care Instructions. \"     If you do not have an account, please click on the \"Sign Up Now\" link. Current as of: July 25, 2018  Content Version: 12.1  © 3077-0668 [employer]. Care instructions adapted under license by [facility] [employer] [facility] ([facility]). If you have questions about a medical condition or this instruction, always ask your healthcare professional. [place] [address] any warranty or liability for your use of this information. Patient Education        Learning About Low Blood Sugar (Hypoglycemia) in Diabetes  What is low blood sugar (hypoglycemia)? Hypoglycemia means that your blood sugar is low and your body (especially your brain) is not getting enough fuel. If you have diabetes, your blood sugar can go too low if you take too much of some diabetes medicines. It can also go too low if you miss a meal. And it can happen if you exercise too hard without eating enough food. Some medicines used to treat other health problems can cause low blood sugar too. What are the symptoms? Symptoms of low blood sugar can start quickly. It may take just 10 to 15 minutes.  If you have had diabetes for many years, you may not realize that your blood

## 2019-08-22 ENCOUNTER — OFFICE VISIT (OUTPATIENT)
Dept: PHYSICAL MEDICINE AND REHAB | Age: 74
End: 2019-08-22
Payer: MEDICARE

## 2019-08-22 VITALS
BODY MASS INDEX: 26.41 KG/M2 | HEIGHT: 59 IN | WEIGHT: 131 LBS | SYSTOLIC BLOOD PRESSURE: 172 MMHG | DIASTOLIC BLOOD PRESSURE: 86 MMHG

## 2019-08-22 DIAGNOSIS — G56.03 BILATERAL CARPAL TUNNEL SYNDROME: Chronic | ICD-10-CM

## 2019-08-22 DIAGNOSIS — M48.062 SPINAL STENOSIS OF LUMBAR REGION WITH NEUROGENIC CLAUDICATION: Chronic | ICD-10-CM

## 2019-08-22 DIAGNOSIS — M54.12 CERVICAL RADICULAR PAIN: ICD-10-CM

## 2019-08-22 DIAGNOSIS — M47.26 OSTEOARTHRITIS OF SPINE WITH RADICULOPATHY, LUMBAR REGION: ICD-10-CM

## 2019-08-22 DIAGNOSIS — M79.10 MYALGIA: ICD-10-CM

## 2019-08-22 DIAGNOSIS — E11.49 DIABETIC RADICULOPATHY (HCC): ICD-10-CM

## 2019-08-22 DIAGNOSIS — F32.9 REACTIVE DEPRESSION: ICD-10-CM

## 2019-08-22 DIAGNOSIS — G58.0 INTERCOSTAL NEUROPATHY: ICD-10-CM

## 2019-08-22 DIAGNOSIS — Z79.899 HIGH RISK MEDICATION USE: ICD-10-CM

## 2019-08-22 DIAGNOSIS — M54.10 DIABETIC RADICULOPATHY (HCC): ICD-10-CM

## 2019-08-22 DIAGNOSIS — M54.17 LUMBOSACRAL RADICULOPATHY AT L5: Primary | Chronic | ICD-10-CM

## 2019-08-22 PROCEDURE — 2022F DILAT RTA XM EVC RTNOPTHY: CPT | Performed by: PHYSICAL MEDICINE & REHABILITATION

## 2019-08-22 PROCEDURE — G8427 DOCREV CUR MEDS BY ELIG CLIN: HCPCS | Performed by: PHYSICAL MEDICINE & REHABILITATION

## 2019-08-22 PROCEDURE — G8400 PT W/DXA NO RESULTS DOC: HCPCS | Performed by: PHYSICAL MEDICINE & REHABILITATION

## 2019-08-22 PROCEDURE — 3017F COLORECTAL CA SCREEN DOC REV: CPT | Performed by: PHYSICAL MEDICINE & REHABILITATION

## 2019-08-22 PROCEDURE — G8417 CALC BMI ABV UP PARAM F/U: HCPCS | Performed by: PHYSICAL MEDICINE & REHABILITATION

## 2019-08-22 PROCEDURE — 4004F PT TOBACCO SCREEN RCVD TLK: CPT | Performed by: PHYSICAL MEDICINE & REHABILITATION

## 2019-08-22 PROCEDURE — 99215 OFFICE O/P EST HI 40 MIN: CPT | Performed by: PHYSICAL MEDICINE & REHABILITATION

## 2019-08-22 PROCEDURE — 1123F ACP DISCUSS/DSCN MKR DOCD: CPT | Performed by: PHYSICAL MEDICINE & REHABILITATION

## 2019-08-22 PROCEDURE — 4040F PNEUMOC VAC/ADMIN/RCVD: CPT | Performed by: PHYSICAL MEDICINE & REHABILITATION

## 2019-08-22 PROCEDURE — 1090F PRES/ABSN URINE INCON ASSESS: CPT | Performed by: PHYSICAL MEDICINE & REHABILITATION

## 2019-08-22 PROCEDURE — 3046F HEMOGLOBIN A1C LEVEL >9.0%: CPT | Performed by: PHYSICAL MEDICINE & REHABILITATION

## 2019-08-22 RX ORDER — HYDROCODONE BITARTRATE AND ACETAMINOPHEN 5; 325 MG/1; MG/1
1 TABLET ORAL EVERY 8 HOURS PRN
Qty: 80 TABLET | Refills: 0 | Status: SHIPPED | OUTPATIENT
Start: 2019-08-22 | End: 2019-09-21

## 2019-08-22 RX ORDER — ROSUVASTATIN CALCIUM 40 MG/1
40 TABLET, COATED ORAL EVERY EVENING
Qty: 90 TABLET | Refills: 0 | Status: SHIPPED | OUTPATIENT
Start: 2019-08-22 | End: 2020-02-20 | Stop reason: SDUPTHER

## 2019-08-22 ASSESSMENT — ENCOUNTER SYMPTOMS
SHORTNESS OF BREATH: 1
NAUSEA: 0
CHEST TIGHTNESS: 0
COUGH: 0
VOMITING: 0
PHOTOPHOBIA: 0
DIARRHEA: 0
BACK PAIN: 1
WHEEZING: 0
ABDOMINAL DISTENTION: 0
CONSTIPATION: 1
COLOR CHANGE: 1
ABDOMINAL PAIN: 1
EYE PAIN: 0

## 2019-08-22 ASSESSMENT — CROHNS DISEASE ACTIVITY INDEX (CDAI): CDAI SCORE: 0

## 2019-08-22 NOTE — PROGRESS NOTES
Subjective  Blaine Ramey, 75 y.o. female presents today with:       Chief Complaint           Back Pain Left sciatic injection 6/25/19 with 70% reduction in pain lasting 2.5 weeks    Leg Pain Right    Hand Pain Bilateral, numbness    Medication Refill Norco refill & pill count today- compliant         Patient  Is on rare Norco and   Lyrica-which helps her pain--no side effects--no signs of abuse--is more functional with the meds and has possitive interactions with friends and family. She understands that these medications may be long-term because of her severe unrelenting arthritic pain. Recently hospitalized for low blood sugar. She told the MA she was hospitalized for diabetic coma from an injection she got here however that is not the case she was not getting any cortisone injections just sciatic blocks without cortisone and the problem was low blood sugar not high blood sugar    Office injections have been of limited help. She thinks that the caudal blocks she had 4 years ago helped a lot better and she would like to trial those. I warned her that it could increase her blood sugar but we will use half dose of cortisone. Abdominal Pain   This is a recurrent (dt her diabetes) problem. The current episode started more than 1 year ago. The onset quality is undetermined. The problem occurs constantly. The problem has been unchanged. The pain is located in the generalized abdominal region, LUQ and LLQ. The pain is at a severity of 2/10. The pain is severe. The quality of the pain is aching and dull. The abdominal pain does not radiate. Associated symptoms include arthralgias, constipation, headaches and myalgias. Pertinent negatives include no diarrhea, dysuria, fever, frequency, hematuria, nausea or vomiting. Nothing aggravates the pain. The pain is relieved by nothing. She has tried nothing for the symptoms. The treatment provided no relief.  Prior diagnostic workup includes GI consult, upper      Social History     Socioeconomic History    Marital status:      Spouse name: None    Number of children: None    Years of education: None    Highest education level: None   Occupational History    Occupation: Retired-   Social Needs    Financial resource strain: None    Food insecurity:     Worry: None     Inability: None    Transportation needs:     Medical: None     Non-medical: None   Tobacco Use    Smoking status: Heavy Tobacco Smoker     Packs/day: 1.00     Years: 15.00     Pack years: 15.00     Types: Cigarettes     Start date: 11/30/2017    Smokeless tobacco: Never Used    Tobacco comment: smokes about 4 cigarettes a day   Substance and Sexual Activity    Alcohol use: No    Drug use: No    Sexual activity: Yes   Lifestyle    Physical activity:     Days per week: None     Minutes per session: None    Stress: None   Relationships    Social connections:     Talks on phone: None     Gets together: None     Attends Restorationism service: None     Active member of club or organization: None     Attends meetings of clubs or organizations: None     Relationship status: None    Intimate partner violence:     Fear of current or ex partner: None     Emotionally abused: None     Physically abused: None     Forced sexual activity: None   Other Topics Concern    None   Social History Narrative    None     Family History   Problem Relation Age of Onset    Heart Disease Father     Arthritis Father     Arthritis Mother        Allergies   Allergen Reactions    Darvon [Propoxyphene Hcl]     Ibuprofen Nausea Only    Metformin And Related      Diarrhea         Review of Systems   Constitutional: Positive for activity change, appetite change and fatigue. Negative for fever and unexpected weight change. HENT: Positive for dental problem. Eyes: Negative for photophobia and pain. Respiratory: Positive for shortness of breath.  Negative for cough, chest tightness rigidity. No tracheal deviation and no edema present. No thyromegaly present. Cardiovascular: Normal rate, regular rhythm, normal heart sounds and intact distal pulses. Exam reveals no gallop, no friction rub and no decreased pulses. No murmur heard. Pulmonary/Chest: Effort normal. No accessory muscle usage. No apnea, no tachypnea and no bradypnea. No respiratory distress. She has decreased breath sounds. She has no wheezes. She has no rales. She exhibits no tenderness. Abdominal: Soft. Bowel sounds are normal. She exhibits no distension and no mass. There is no tenderness. There is no rebound and no guarding. Area of pain and hypersensitivity   Musculoskeletal: She exhibits tenderness. She exhibits no edema. Right shoulder: Normal.        Left shoulder: Normal.        Right elbow: Normal.       Left elbow: Normal.        Right wrist: Normal.        Left wrist: Normal.        Right hip: Normal.        Left hip: Normal.        Right knee: She exhibits decreased range of motion and swelling. Tenderness found. Medial joint line tenderness noted. Left knee: She exhibits decreased range of motion. Tenderness found. Medial joint line and lateral joint line tenderness noted. Right ankle: Normal. Achilles tendon normal.        Left ankle: Normal. Achilles tendon normal.        Cervical back: She exhibits decreased range of motion, tenderness, pain and spasm. Thoracic back: She exhibits decreased range of motion, tenderness, pain and spasm. Lumbar back: She exhibits decreased range of motion, tenderness, bony tenderness and pain. She exhibits no swelling, no edema, no deformity, no laceration and normal pulse. Back:         Right upper arm: Normal.        Left upper arm: Normal.        Right forearm: Normal.        Left forearm: Normal.        Arms:       Right hand: Normal. Normal strength noted. Left hand: Decreased sensation noted.  Decreased sensation is results found for: CODEINE, MORPHINE, ACETYLMORPHI, OXYCODONE, NOROXYCODONE, NOROXYMU, HYDRCO, NORHYDU, HYDROMO, BUPREN, NORBUPRNOR, FENTA, NORFENT, MEPERIDINE, TAPENU, TAPOSULFUR, METHADONE, LABPROP, TRAM, AMPH, METHAMP, MDMA, ECMDA    Lab Results   Component Value Date    LABCREA 120.7 01/02/2018         , I discussed results with patient. See follow-up plans for new studies. Therapies:  HEP-gentle stretching and relaxation techniques-demonstrated with patient-they are to do them twice a day. They are also advised to make the following lifestyle changes:  Goals      Conditions and Symptoms      I will follow my Zone Management tool to seek urgent or emergent care. I will notify my provider of any symptoms that indicate a worsening of my condition. Barriers: lack of education  Plan for overcoming my barriers: care coordination , pharmacy and dietary referrals  Confidence: 7/10  Anticipated Goal Completion Date: 9/20/2019         Exercise 3x per week (30 min per time)      To limit narcotic dosing       HEMOGLOBIN A1C < 7      LDL CALC < 100      SOAPP-R GOAL LESS THAN 9      11/10/16 SCORE: 27-HIGH RISK     02/06/17 score: 22-high risk   04/11/17 score:32-high risk  06/12/17 score: 21-high risk  08/14/17 score: 18-moderate risk  10/16/17 score: 15-moderate risk  12/21/17 score: 16-moderate risk  5/11/2018 score: 18-high risk  8/15/18 score: 7- low risk  11/7/18 score: 18- moderate risk. 01/10/19 score:13- moderate risk  3/13/19 score:13 - moderate risk  5/31/19 score: 16- moderate risk  8/22/19 score: 19- high risk           Stop Cigarette/Tobacco use      Weight < 140 lb (63.504 kg)           Injections or Epidurals:  Injection options were discussed. After a complete review of the medical record,OARRS, a comprehensive physical exam, and discussion with the patient I have determined that the patient would benefit from a series of 2 caudal epidural steroid injections using a C-arm and contrast as

## 2019-08-23 ENCOUNTER — PROCEDURE VISIT (OUTPATIENT)
Dept: OBGYN CLINIC | Age: 74
End: 2019-08-23
Payer: MEDICARE

## 2019-08-23 VITALS
DIASTOLIC BLOOD PRESSURE: 84 MMHG | WEIGHT: 134.2 LBS | HEIGHT: 59 IN | SYSTOLIC BLOOD PRESSURE: 130 MMHG | BODY MASS INDEX: 27.05 KG/M2

## 2019-08-23 DIAGNOSIS — N95.0 PMB (POSTMENOPAUSAL BLEEDING): Primary | ICD-10-CM

## 2019-08-23 DIAGNOSIS — N88.2 CERVICAL STENOSIS (UTERINE CERVIX): ICD-10-CM

## 2019-08-23 DIAGNOSIS — R10.2 PELVIC PAIN: ICD-10-CM

## 2019-08-23 DIAGNOSIS — R10.31 RIGHT LOWER QUADRANT ABDOMINAL PAIN: ICD-10-CM

## 2019-08-23 PROCEDURE — 58100 BIOPSY OF UTERUS LINING: CPT | Performed by: OBSTETRICS & GYNECOLOGY

## 2019-08-28 ENCOUNTER — CARE COORDINATION (OUTPATIENT)
Dept: CARE COORDINATION | Age: 74
End: 2019-08-28

## 2019-09-09 ENCOUNTER — HOSPITAL ENCOUNTER (OUTPATIENT)
Dept: CT IMAGING | Age: 74
Discharge: HOME | End: 2019-09-11
Payer: MEDICARE

## 2019-09-09 VITALS — BODY MASS INDEX: 26.61 KG/M2 | HEIGHT: 59 IN | WEIGHT: 132 LBS

## 2019-09-09 DIAGNOSIS — R10.2 PELVIC PAIN: ICD-10-CM

## 2019-09-09 DIAGNOSIS — R10.31 RIGHT LOWER QUADRANT ABDOMINAL PAIN: ICD-10-CM

## 2019-09-09 DIAGNOSIS — N95.0 PMB (POSTMENOPAUSAL BLEEDING): ICD-10-CM

## 2019-09-09 PROCEDURE — 2500000003 HC RX 250 WO HCPCS: Performed by: OBSTETRICS & GYNECOLOGY

## 2019-09-09 PROCEDURE — 6360000004 HC RX CONTRAST MEDICATION: Performed by: OBSTETRICS & GYNECOLOGY

## 2019-09-09 PROCEDURE — 2580000003 HC RX 258: Performed by: OBSTETRICS & GYNECOLOGY

## 2019-09-09 PROCEDURE — 74177 CT ABD & PELVIS W/CONTRAST: CPT

## 2019-09-09 RX ORDER — SODIUM CHLORIDE 0.9 % (FLUSH) 0.9 %
10 SYRINGE (ML) INJECTION
Status: COMPLETED | OUTPATIENT
Start: 2019-09-09 | End: 2019-09-09

## 2019-09-09 RX ADMIN — IOPAMIDOL 100 ML: 612 INJECTION, SOLUTION INTRAVENOUS at 11:03

## 2019-09-09 RX ADMIN — Medication 10 ML: at 11:03

## 2019-09-09 RX ADMIN — BARIUM SULFATE 450 ML: 20 SUSPENSION ORAL at 11:02

## 2019-09-11 ENCOUNTER — CARE COORDINATION (OUTPATIENT)
Dept: CARE COORDINATION | Age: 74
End: 2019-09-11

## 2019-09-12 NOTE — CARE COORDINATION
Call to Cristopher Rome to discuss health coaching. Left message to return call.  Will mail foot and eye care

## 2019-09-20 ENCOUNTER — TELEPHONE (OUTPATIENT)
Dept: OBGYN CLINIC | Age: 74
End: 2019-09-20

## 2019-09-24 ENCOUNTER — TELEPHONE (OUTPATIENT)
Dept: FAMILY MEDICINE CLINIC | Age: 74
End: 2019-09-24

## 2019-09-27 ENCOUNTER — OFFICE VISIT (OUTPATIENT)
Dept: FAMILY MEDICINE CLINIC | Age: 74
End: 2019-09-27
Payer: MEDICARE

## 2019-09-27 ENCOUNTER — CARE COORDINATION (OUTPATIENT)
Dept: CARE COORDINATION | Age: 74
End: 2019-09-27

## 2019-09-27 VITALS
HEART RATE: 95 BPM | HEIGHT: 59 IN | WEIGHT: 132 LBS | SYSTOLIC BLOOD PRESSURE: 162 MMHG | DIASTOLIC BLOOD PRESSURE: 88 MMHG | OXYGEN SATURATION: 99 % | BODY MASS INDEX: 26.61 KG/M2 | TEMPERATURE: 97.8 F

## 2019-09-27 VITALS
OXYGEN SATURATION: 99 % | BODY MASS INDEX: 26.65 KG/M2 | WEIGHT: 132.2 LBS | DIASTOLIC BLOOD PRESSURE: 88 MMHG | SYSTOLIC BLOOD PRESSURE: 172 MMHG | TEMPERATURE: 97.8 F | HEART RATE: 95 BPM | HEIGHT: 59 IN

## 2019-09-27 DIAGNOSIS — I10 ESSENTIAL HYPERTENSION: Primary | ICD-10-CM

## 2019-09-27 DIAGNOSIS — R31.29 MICROSCOPIC HEMATURIA: ICD-10-CM

## 2019-09-27 DIAGNOSIS — Z00.00 ROUTINE GENERAL MEDICAL EXAMINATION AT A HEALTH CARE FACILITY: Primary | ICD-10-CM

## 2019-09-27 DIAGNOSIS — M54.50 MIDLINE LOW BACK PAIN WITHOUT SCIATICA, UNSPECIFIED CHRONICITY: ICD-10-CM

## 2019-09-27 LAB
BILIRUBIN, POC: NORMAL
BLOOD URINE, POC: NORMAL
CLARITY, POC: NORMAL
COLOR, POC: NORMAL
GLUCOSE URINE, POC: NORMAL
KETONES, POC: NORMAL
LEUKOCYTE EST, POC: NORMAL
NITRITE, POC: NORMAL
PH, POC: 6
PROTEIN, POC: NORMAL
SPECIFIC GRAVITY, POC: 1.02
UROBILINOGEN, POC: NORMAL

## 2019-09-27 PROCEDURE — 1123F ACP DISCUSS/DSCN MKR DOCD: CPT | Performed by: NURSE PRACTITIONER

## 2019-09-27 PROCEDURE — 1123F ACP DISCUSS/DSCN MKR DOCD: CPT | Performed by: FAMILY MEDICINE

## 2019-09-27 PROCEDURE — 3017F COLORECTAL CA SCREEN DOC REV: CPT | Performed by: FAMILY MEDICINE

## 2019-09-27 PROCEDURE — G8427 DOCREV CUR MEDS BY ELIG CLIN: HCPCS | Performed by: FAMILY MEDICINE

## 2019-09-27 PROCEDURE — G8417 CALC BMI ABV UP PARAM F/U: HCPCS | Performed by: FAMILY MEDICINE

## 2019-09-27 PROCEDURE — G8400 PT W/DXA NO RESULTS DOC: HCPCS | Performed by: FAMILY MEDICINE

## 2019-09-27 PROCEDURE — 3017F COLORECTAL CA SCREEN DOC REV: CPT | Performed by: NURSE PRACTITIONER

## 2019-09-27 PROCEDURE — G0438 PPPS, INITIAL VISIT: HCPCS | Performed by: NURSE PRACTITIONER

## 2019-09-27 PROCEDURE — 4004F PT TOBACCO SCREEN RCVD TLK: CPT | Performed by: FAMILY MEDICINE

## 2019-09-27 PROCEDURE — 81002 URINALYSIS NONAUTO W/O SCOPE: CPT | Performed by: FAMILY MEDICINE

## 2019-09-27 PROCEDURE — 4040F PNEUMOC VAC/ADMIN/RCVD: CPT | Performed by: FAMILY MEDICINE

## 2019-09-27 PROCEDURE — 1090F PRES/ABSN URINE INCON ASSESS: CPT | Performed by: FAMILY MEDICINE

## 2019-09-27 PROCEDURE — 4040F PNEUMOC VAC/ADMIN/RCVD: CPT | Performed by: NURSE PRACTITIONER

## 2019-09-27 PROCEDURE — 99213 OFFICE O/P EST LOW 20 MIN: CPT | Performed by: FAMILY MEDICINE

## 2019-09-27 RX ORDER — CIPROFLOXACIN 500 MG/1
500 TABLET, FILM COATED ORAL 2 TIMES DAILY
Qty: 10 TABLET | Refills: 0 | Status: SHIPPED | OUTPATIENT
Start: 2019-09-27 | End: 2019-10-02

## 2019-09-27 RX ORDER — METOPROLOL SUCCINATE 200 MG/1
TABLET, EXTENDED RELEASE ORAL
Qty: 60 TABLET | Refills: 1 | Status: ON HOLD | OUTPATIENT
Start: 2019-09-27 | End: 2019-10-10 | Stop reason: HOSPADM

## 2019-09-27 RX ORDER — HYDROCODONE BITARTRATE AND ACETAMINOPHEN 5; 325 MG/1; MG/1
1 TABLET ORAL EVERY 6 HOURS PRN
COMMUNITY
End: 2019-09-30

## 2019-09-27 ASSESSMENT — ENCOUNTER SYMPTOMS
ABDOMINAL PAIN: 0
BACK PAIN: 1
SHORTNESS OF BREATH: 0

## 2019-09-27 ASSESSMENT — PATIENT HEALTH QUESTIONNAIRE - PHQ9
SUM OF ALL RESPONSES TO PHQ QUESTIONS 1-9: 2
SUM OF ALL RESPONSES TO PHQ QUESTIONS 1-9: 2

## 2019-09-27 ASSESSMENT — LIFESTYLE VARIABLES: HOW OFTEN DO YOU HAVE A DRINK CONTAINING ALCOHOL: 0

## 2019-09-27 NOTE — PROGRESS NOTES
pregabalin (LYRICA) 25 MG capsule Take 1 capsule by mouth 3 times daily for 90 days. . 270 capsule 0     No current facility-administered medications for this visit. Family History   Problem Relation Age of Onset    Heart Disease Father     Arthritis Father     Arthritis Mother      Past Medical History:   Diagnosis Date    Anxiety     Asthma     Chronic back pain     Bilateral L5 S1 Radic on emg--surprisingly worse on the left than the right--pt's symptoms and her MRI show worse on the right    Depression     Fibromyalgia     Hyperlipidemia     Hypertension     Osteoarthritis     Type II diabetes mellitus, uncontrolled ([address]     Unspecified sleep apnea      Objective:   BP (!) 162/88   Pulse 95   Temp 97.8 °F (36.6 °C)   Ht 4' 11\" (1.499 m)   Wt 132 lb (59.9 kg)   LMP  (LMP Unknown)   SpO2 99%   BMI 26.66 kg/m²     Physical Exam  Neck:no carotid bruits. No masses. No adenopathy. No thyroid asymmetry. Lungs:clear and equal breath sounds. No wheezes or rales. Heart:rate reg. 1-2 syst murmur along rub   No gallops   Back;   Mild tenderness along right paraspinal region of lumbar spine. No cva tenderness. Extremities:no edema in either leg  Gen: In no acute distress  Abdomen; B.S present. Soft  Non tender. No hepatosplenomegaly. No masses   Assessment:       Diagnosis Orders   1. Essential hypertension  POCT Urinalysis no Micro   2. Midline low back pain without sciatica, unspecified chronicity  [facility] - Renny Cutler MD, Pain Management, [facility]   3.  Microscopic hematuria  Urine Culture         Plan:    restart toprol  Orders Placed This Encounter   Medications    metoprolol succinate (TOPROL XL) 200 MG extended release tablet     Si po qday x 1 weeks then 2 po qday     Dispense:  60 tablet     Refill:  1     Orders Placed This Encounter   Procedures   Deneen Arellano MD, Pain Management, [facility]     Referral Priority:   Urgent     Referral Type:   Eval and Treat

## 2019-09-30 ENCOUNTER — HOSPITAL ENCOUNTER (OUTPATIENT)
Dept: PREADMISSION TESTING | Age: 74
Discharge: HOME | End: 2019-10-04
Payer: MEDICARE

## 2019-09-30 ENCOUNTER — TELEPHONE (OUTPATIENT)
Dept: FAMILY MEDICINE CLINIC | Age: 74
End: 2019-09-30

## 2019-09-30 VITALS
DIASTOLIC BLOOD PRESSURE: 85 MMHG | WEIGHT: 132 LBS | OXYGEN SATURATION: 100 % | HEART RATE: 79 BPM | RESPIRATION RATE: 18 BRPM | HEIGHT: 59 IN | BODY MASS INDEX: 26.61 KG/M2 | TEMPERATURE: 97.2 F | SYSTOLIC BLOOD PRESSURE: 173 MMHG

## 2019-09-30 LAB
ABO/RH: NORMAL
ANION GAP SERPL CALCULATED.3IONS-SCNC: 15 MEQ/L (ref 9–15)
ANTIBODY SCREEN: NORMAL
BUN BLDV-MCNC: 23 MG/DL (ref 8–23)
CALCIUM SERPL-MCNC: 9.8 MG/DL (ref 8.5–9.9)
CHLORIDE BLD-SCNC: 106 MEQ/L (ref 95–107)
CO2: 22 MEQ/L (ref 20–31)
CREAT SERPL-MCNC: 1.02 MG/DL (ref 0.5–0.9)
GFR AFRICAN AMERICAN: >60
GFR NON-AFRICAN AMERICAN: 52.8
GLUCOSE BLD-MCNC: 249 MG/DL (ref 70–99)
HCT VFR BLD CALC: 32.9 % (ref 37–47)
HEMOGLOBIN: 10.1 G/DL (ref 12–16)
MCH RBC QN AUTO: 22.6 PG (ref 27–31.3)
MCHC RBC AUTO-ENTMCNC: 30.8 % (ref 33–37)
MCV RBC AUTO: 73.2 FL (ref 82–100)
ORGANISM: ABNORMAL
PDW BLD-RTO: 18.6 % (ref 11.5–14.5)
PLATELET # BLD: 478 K/UL (ref 130–400)
POTASSIUM SERPL-SCNC: 4.5 MEQ/L (ref 3.4–4.9)
RBC # BLD: 4.49 M/UL (ref 4.2–5.4)
SODIUM BLD-SCNC: 143 MEQ/L (ref 135–144)
URINE CULTURE, ROUTINE: ABNORMAL
WBC # BLD: 7.4 K/UL (ref 4.8–10.8)

## 2019-09-30 PROCEDURE — 86900 BLOOD TYPING SEROLOGIC ABO: CPT

## 2019-09-30 PROCEDURE — 86850 RBC ANTIBODY SCREEN: CPT

## 2019-09-30 PROCEDURE — 80048 BASIC METABOLIC PNL TOTAL CA: CPT

## 2019-09-30 PROCEDURE — 86901 BLOOD TYPING SEROLOGIC RH(D): CPT

## 2019-09-30 PROCEDURE — 85027 COMPLETE CBC AUTOMATED: CPT

## 2019-09-30 RX ORDER — SODIUM CHLORIDE 0.9 % (FLUSH) 0.9 %
10 SYRINGE (ML) INJECTION EVERY 12 HOURS SCHEDULED
Status: CANCELLED | OUTPATIENT
Start: 2019-10-07

## 2019-09-30 RX ORDER — LIDOCAINE HYDROCHLORIDE 10 MG/ML
1 INJECTION, SOLUTION EPIDURAL; INFILTRATION; INTRACAUDAL; PERINEURAL
Status: CANCELLED | OUTPATIENT
Start: 2019-10-07 | End: 2019-10-07

## 2019-09-30 RX ORDER — SODIUM CHLORIDE, SODIUM LACTATE, POTASSIUM CHLORIDE, CALCIUM CHLORIDE 600; 310; 30; 20 MG/100ML; MG/100ML; MG/100ML; MG/100ML
INJECTION, SOLUTION INTRAVENOUS CONTINUOUS
Status: CANCELLED | OUTPATIENT
Start: 2019-10-07

## 2019-09-30 RX ORDER — CEFAZOLIN SODIUM 2 G/50ML
2 SOLUTION INTRAVENOUS ONCE
Status: CANCELLED | OUTPATIENT
Start: 2019-10-07

## 2019-09-30 RX ORDER — SODIUM CHLORIDE 0.9 % (FLUSH) 0.9 %
10 SYRINGE (ML) INJECTION PRN
Status: CANCELLED | OUTPATIENT
Start: 2019-10-07

## 2019-09-30 ASSESSMENT — ENCOUNTER SYMPTOMS
VOMITING: 0
DIARRHEA: 0
SORE THROAT: 0
ABDOMINAL PAIN: 0
SHORTNESS OF BREATH: 0
CHEST TIGHTNESS: 0
NAUSEA: 0
EYES NEGATIVE: 1
CONSTIPATION: 0
WHEEZING: 0
TROUBLE SWALLOWING: 0
STRIDOR: 0
ALLERGIC/IMMUNOLOGIC NEGATIVE: 1
COUGH: 0
BACK PAIN: 1

## 2019-09-30 NOTE — CARE COORDINATION
Ambulatory Care Coordination Note  9/27/2019  CM Risk Score: 8  Charlson 10 Year Mortality Risk Score: 100%     ACC: Kemar Denise RN    Summary Note: met with jeannie at dr visit. She is co severe pain to left leg and is tearful regarding pain. The pain starts in the back and radiates down her leg. Reports her blood sugar today 150. Unable to complete further teaching due to the focus on pain. She will discuss pain with pcp  Diabetes Assessment    Medic Alert ID:  No  Meal Planning:  Avoidance of concentrated sweets   How often do you test your blood sugar?:  Daily   Do you have barriers with adherence to non-pharmacologic self-management interventions? (Nutrition/Exercise/Self-Monitoring):  Yes   Have you ever had to go to the ED for symptoms of low blood sugar?:  No       No patient-reported symptoms              Care Coordination Interventions    Program Enrollment:  Rising Risk  Referral from Primary Care Provider:  No  Suggested Interventions and Community Resources  Fall Risk Prevention: In Process  Pharmacist:  Completed (Comment: 6/24/2019 in basket)  Registered Dietician:  Completed (Comment: 6/24/2019)  Zone Management Tools:  Completed (Comment: 6/24/2019 mail dm)  Other Services or Interventions:  6/24/2019 will mail walk in clinic          Goals Addressed                 This Visit's Progress     Conditions and Symptoms   Worsening     I will follow my Zone Management tool to seek urgent or emergent care. I will notify my provider of any symptoms that indicate a worsening of my condition. Barriers: lack of education  Plan for overcoming my barriers: care coordination , pharmacy and dietary referrals  Confidence: 7/10  Anticipated Goal Completion Date: 9/20/2019              Prior to Admission medications    Medication Sig Start Date End Date Taking? Authorizing Provider   HYDROcodone-acetaminophen (NORCO) 5-325 MG per tablet Take 1 tablet by mouth every 6 hours as needed for Pain.     Historical MD Conrad   metoprolol succinate (TOPROL XL) 200 MG extended release tablet 1 po qday x 1 weeks then 2 po qday 9/27/19   Reena Brito MD   ciprofloxacin (CIPRO) 500 MG tablet Take 1 tablet by mouth 2 times daily for 5 days 9/27/19 10/2/19  Reena Brito MD   rosuvastatin (CRESTOR) 40 MG tablet Take 1 tablet by mouth every evening 8/22/19   Reena Brito MD   [facility]) 72 MCG CAPS capsule Take 1 capsule by mouth every morning (before breakfast) Lot: [id number]  Exp: 10/19  Patient not taking: Reported on 9/27/2019 7/11/19   Reena Brito MD   PARoxetine (PAXIL) 40 MG tablet TAKE 1 TABLET BY MOUTH ONCE DAILY IN THE MORNING 7/2/19   Reena Brito MD   lisinopril (PRINIVIL;ZESTRIL) 10 MG tablet TAKE 1 TABLET BY MOUTH TWICE DAILY 5/22/19   Alex Brown MD   misoprostol (CYTOTEC) 200 MCG tablet Take 1 tablet by mouth every 12 hours for 3 days 4/17/19 4/20/19  Nalini Dueñas DO   insulin 70-30 (HUMULIN 70/30) (70-30) 100 UNIT per ML injection vial Inject 60 units bid please give 4 vials for a 30 day supply 4/9/19   Ashley Purvis MD   albuterol sulfate HFA (PROVENTIL HFA) 108 (90 Base) MCG/ACT inhaler Inhale 2 puffs into the lungs every 6 hours as needed for Wheezing 4/1/19   Reena Brito MD   aspirin 81 MG EC tablet Take 1 tablet by mouth daily 3/25/19   Alex Brown MD   furosemide (LASIX) 40 MG tablet Take 1 tablet by mouth daily  Patient not taking: Reported on 9/27/2019 3/25/19   Alex Brown MD   albuterol (PROVENTIL) (5 MG/ML) 0.5% nebulizer solution Take 0.5 mLs by nebulization every 6 hours as needed for Wheezing 3/24/19   Lucas Lau MD   ezetimibe (ZETIA) 10 MG tablet Take 1 tablet by mouth daily 2/12/19   Reena Brito MD   pregabalin (LYRICA) 25 MG capsule Take 1 capsule by mouth 3 times daily for 90 days. . 11/7/18 2/12/19  Pauly Perez, DO       Future Appointments   Date Time Provider Zaid Lr   10/22/2019 10:30 AM Nalini Dueñas DO [facility]

## 2019-10-07 ENCOUNTER — ANESTHESIA EVENT (OUTPATIENT)
Dept: OPERATING ROOM | Age: 74
DRG: 987 | End: 2019-10-07
Payer: MEDICARE

## 2019-10-07 ENCOUNTER — ANESTHESIA (OUTPATIENT)
Dept: OPERATING ROOM | Age: 74
DRG: 987 | End: 2019-10-07
Payer: MEDICARE

## 2019-10-07 ENCOUNTER — HOSPITAL ENCOUNTER (OUTPATIENT)
Age: 74
Setting detail: OUTPATIENT SURGERY
Discharge: HOME | DRG: 987 | End: 2019-10-07
Attending: OBSTETRICS & GYNECOLOGY | Admitting: OBSTETRICS & GYNECOLOGY
Payer: MEDICARE

## 2019-10-07 VITALS
TEMPERATURE: 97.3 F | RESPIRATION RATE: 16 BRPM | HEART RATE: 78 BPM | HEIGHT: 59 IN | OXYGEN SATURATION: 100 % | SYSTOLIC BLOOD PRESSURE: 177 MMHG | BODY MASS INDEX: 26.61 KG/M2 | DIASTOLIC BLOOD PRESSURE: 80 MMHG | WEIGHT: 132 LBS

## 2019-10-07 VITALS — TEMPERATURE: 96.1 F | SYSTOLIC BLOOD PRESSURE: 112 MMHG | DIASTOLIC BLOOD PRESSURE: 55 MMHG | OXYGEN SATURATION: 100 %

## 2019-10-07 LAB
GLUCOSE BLD-MCNC: 227 MG/DL (ref 60–115)
GLUCOSE BLD-MCNC: 239 MG/DL (ref 60–115)
PERFORMED ON: ABNORMAL
PERFORMED ON: ABNORMAL

## 2019-10-07 PROCEDURE — 6360000002 HC RX W HCPCS: Performed by: NURSE ANESTHETIST, CERTIFIED REGISTERED

## 2019-10-07 PROCEDURE — 2709999900 HC NON-CHARGEABLE SUPPLY: Performed by: OBSTETRICS & GYNECOLOGY

## 2019-10-07 PROCEDURE — 7100000010 HC PHASE II RECOVERY - FIRST 15 MIN: Performed by: OBSTETRICS & GYNECOLOGY

## 2019-10-07 PROCEDURE — 58558 HYSTEROSCOPY BIOPSY: CPT | Performed by: OBSTETRICS & GYNECOLOGY

## 2019-10-07 PROCEDURE — 88305 TISSUE EXAM BY PATHOLOGIST: CPT

## 2019-10-07 PROCEDURE — 6360000002 HC RX W HCPCS: Performed by: ANESTHESIOLOGY

## 2019-10-07 PROCEDURE — 2580000003 HC RX 258: Performed by: NURSE PRACTITIONER

## 2019-10-07 PROCEDURE — 93458 L HRT ARTERY/VENTRICLE ANGIO: CPT | Performed by: INTERNAL MEDICINE

## 2019-10-07 PROCEDURE — 3600000004 HC SURGERY LEVEL 4 BASE: Performed by: OBSTETRICS & GYNECOLOGY

## 2019-10-07 PROCEDURE — 2580000003 HC RX 258: Performed by: OBSTETRICS & GYNECOLOGY

## 2019-10-07 PROCEDURE — 3600000014 HC SURGERY LEVEL 4 ADDTL 15MIN: Performed by: OBSTETRICS & GYNECOLOGY

## 2019-10-07 PROCEDURE — 3700000001 HC ADD 15 MINUTES (ANESTHESIA): Performed by: OBSTETRICS & GYNECOLOGY

## 2019-10-07 PROCEDURE — 3700000000 HC ANESTHESIA ATTENDED CARE: Performed by: OBSTETRICS & GYNECOLOGY

## 2019-10-07 PROCEDURE — 7100000000 HC PACU RECOVERY - FIRST 15 MIN: Performed by: OBSTETRICS & GYNECOLOGY

## 2019-10-07 PROCEDURE — 7100000001 HC PACU RECOVERY - ADDTL 15 MIN: Performed by: OBSTETRICS & GYNECOLOGY

## 2019-10-07 PROCEDURE — 0UDB8ZZ EXTRACTION OF ENDOMETRIUM, VIA NATURAL OR ARTIFICIAL OPENING ENDOSCOPIC: ICD-10-PCS | Performed by: OBSTETRICS & GYNECOLOGY

## 2019-10-07 PROCEDURE — 6360000002 HC RX W HCPCS: Performed by: NURSE PRACTITIONER

## 2019-10-07 PROCEDURE — 7100000011 HC PHASE II RECOVERY - ADDTL 15 MIN: Performed by: OBSTETRICS & GYNECOLOGY

## 2019-10-07 RX ORDER — PROPOFOL 10 MG/ML
INJECTION, EMULSION INTRAVENOUS PRN
Status: DISCONTINUED | OUTPATIENT
Start: 2019-10-07 | End: 2019-10-07 | Stop reason: SDUPTHER

## 2019-10-07 RX ORDER — ONDANSETRON 2 MG/ML
INJECTION INTRAMUSCULAR; INTRAVENOUS PRN
Status: DISCONTINUED | OUTPATIENT
Start: 2019-10-07 | End: 2019-10-07 | Stop reason: SDUPTHER

## 2019-10-07 RX ORDER — SODIUM CHLORIDE 0.9 % (FLUSH) 0.9 %
10 SYRINGE (ML) INJECTION EVERY 12 HOURS SCHEDULED
Status: DISCONTINUED | OUTPATIENT
Start: 2019-10-07 | End: 2019-10-07 | Stop reason: HOSPADM

## 2019-10-07 RX ORDER — METOCLOPRAMIDE HYDROCHLORIDE 5 MG/ML
10 INJECTION INTRAMUSCULAR; INTRAVENOUS
Status: DISCONTINUED | OUTPATIENT
Start: 2019-10-07 | End: 2019-10-07 | Stop reason: HOSPADM

## 2019-10-07 RX ORDER — CEFAZOLIN SODIUM 2 G/50ML
2 SOLUTION INTRAVENOUS ONCE
Status: COMPLETED | OUTPATIENT
Start: 2019-10-07 | End: 2019-10-07

## 2019-10-07 RX ORDER — DIPHENHYDRAMINE HYDROCHLORIDE 50 MG/ML
12.5 INJECTION INTRAMUSCULAR; INTRAVENOUS
Status: DISCONTINUED | OUTPATIENT
Start: 2019-10-07 | End: 2019-10-07 | Stop reason: HOSPADM

## 2019-10-07 RX ORDER — DEXAMETHASONE SODIUM PHOSPHATE 4 MG/ML
INJECTION, SOLUTION INTRA-ARTICULAR; INTRALESIONAL; INTRAMUSCULAR; INTRAVENOUS; SOFT TISSUE PRN
Status: DISCONTINUED | OUTPATIENT
Start: 2019-10-07 | End: 2019-10-07

## 2019-10-07 RX ORDER — SODIUM CHLORIDE 0.9 % (FLUSH) 0.9 %
10 SYRINGE (ML) INJECTION PRN
Status: DISCONTINUED | OUTPATIENT
Start: 2019-10-07 | End: 2019-10-07 | Stop reason: HOSPADM

## 2019-10-07 RX ORDER — MEPERIDINE HYDROCHLORIDE 25 MG/ML
12.5 INJECTION INTRAMUSCULAR; INTRAVENOUS; SUBCUTANEOUS EVERY 5 MIN PRN
Status: DISCONTINUED | OUTPATIENT
Start: 2019-10-07 | End: 2019-10-07 | Stop reason: HOSPADM

## 2019-10-07 RX ORDER — LIDOCAINE HYDROCHLORIDE 20 MG/ML
INJECTION, SOLUTION INTRAVENOUS PRN
Status: DISCONTINUED | OUTPATIENT
Start: 2019-10-07 | End: 2019-10-07 | Stop reason: SDUPTHER

## 2019-10-07 RX ORDER — FENTANYL CITRATE 50 UG/ML
INJECTION, SOLUTION INTRAMUSCULAR; INTRAVENOUS PRN
Status: DISCONTINUED | OUTPATIENT
Start: 2019-10-07 | End: 2019-10-07 | Stop reason: SDUPTHER

## 2019-10-07 RX ORDER — HYDROCODONE BITARTRATE AND ACETAMINOPHEN 5; 325 MG/1; MG/1
2 TABLET ORAL PRN
Status: DISCONTINUED | OUTPATIENT
Start: 2019-10-07 | End: 2019-10-07 | Stop reason: HOSPADM

## 2019-10-07 RX ORDER — LIDOCAINE HYDROCHLORIDE 10 MG/ML
1 INJECTION, SOLUTION EPIDURAL; INFILTRATION; INTRACAUDAL; PERINEURAL
Status: DISCONTINUED | OUTPATIENT
Start: 2019-10-07 | End: 2019-10-07 | Stop reason: HOSPADM

## 2019-10-07 RX ORDER — ONDANSETRON 2 MG/ML
4 INJECTION INTRAMUSCULAR; INTRAVENOUS
Status: DISCONTINUED | OUTPATIENT
Start: 2019-10-07 | End: 2019-10-07 | Stop reason: HOSPADM

## 2019-10-07 RX ORDER — SODIUM CHLORIDE, SODIUM LACTATE, POTASSIUM CHLORIDE, CALCIUM CHLORIDE 600; 310; 30; 20 MG/100ML; MG/100ML; MG/100ML; MG/100ML
INJECTION, SOLUTION INTRAVENOUS CONTINUOUS
Status: DISCONTINUED | OUTPATIENT
Start: 2019-10-07 | End: 2019-10-07 | Stop reason: SDUPTHER

## 2019-10-07 RX ORDER — LIDOCAINE HYDROCHLORIDE 10 MG/ML
1 INJECTION, SOLUTION EPIDURAL; INFILTRATION; INTRACAUDAL; PERINEURAL
Status: DISCONTINUED | OUTPATIENT
Start: 2019-10-07 | End: 2019-10-07 | Stop reason: SDUPTHER

## 2019-10-07 RX ORDER — MAGNESIUM HYDROXIDE 1200 MG/15ML
LIQUID ORAL CONTINUOUS PRN
Status: COMPLETED | OUTPATIENT
Start: 2019-10-07 | End: 2019-10-07

## 2019-10-07 RX ORDER — FENTANYL CITRATE 50 UG/ML
50 INJECTION, SOLUTION INTRAMUSCULAR; INTRAVENOUS EVERY 10 MIN PRN
Status: DISCONTINUED | OUTPATIENT
Start: 2019-10-07 | End: 2019-10-07 | Stop reason: HOSPADM

## 2019-10-07 RX ORDER — SODIUM CHLORIDE, SODIUM LACTATE, POTASSIUM CHLORIDE, CALCIUM CHLORIDE 600; 310; 30; 20 MG/100ML; MG/100ML; MG/100ML; MG/100ML
INJECTION, SOLUTION INTRAVENOUS CONTINUOUS
Status: DISCONTINUED | OUTPATIENT
Start: 2019-10-07 | End: 2019-10-07 | Stop reason: HOSPADM

## 2019-10-07 RX ORDER — HYDROCODONE BITARTRATE AND ACETAMINOPHEN 5; 325 MG/1; MG/1
1 TABLET ORAL PRN
Status: DISCONTINUED | OUTPATIENT
Start: 2019-10-07 | End: 2019-10-07 | Stop reason: HOSPADM

## 2019-10-07 RX ADMIN — FENTANYL CITRATE 50 MCG: 50 INJECTION, SOLUTION INTRAMUSCULAR; INTRAVENOUS at 11:14

## 2019-10-07 RX ADMIN — FENTANYL CITRATE 50 MCG: 50 INJECTION, SOLUTION INTRAMUSCULAR; INTRAVENOUS at 12:15

## 2019-10-07 RX ADMIN — CEFAZOLIN SODIUM 2 G: 2 SOLUTION INTRAVENOUS at 11:09

## 2019-10-07 RX ADMIN — LIDOCAINE HYDROCHLORIDE 100 MG: 20 INJECTION, SOLUTION INTRAVENOUS at 11:14

## 2019-10-07 RX ADMIN — SODIUM CHLORIDE, POTASSIUM CHLORIDE, SODIUM LACTATE AND CALCIUM CHLORIDE 1000 ML: 600; 310; 30; 20 INJECTION, SOLUTION INTRAVENOUS at 08:10

## 2019-10-07 RX ADMIN — FENTANYL CITRATE 25 MCG: 50 INJECTION, SOLUTION INTRAMUSCULAR; INTRAVENOUS at 11:50

## 2019-10-07 RX ADMIN — PROPOFOL 200 MG: 10 INJECTION, EMULSION INTRAVENOUS at 11:14

## 2019-10-07 RX ADMIN — FENTANYL CITRATE 25 MCG: 50 INJECTION, SOLUTION INTRAMUSCULAR; INTRAVENOUS at 11:44

## 2019-10-07 RX ADMIN — ONDANSETRON 4 MG: 2 INJECTION INTRAMUSCULAR; INTRAVENOUS at 11:29

## 2019-10-07 RX ADMIN — FENTANYL CITRATE 50 MCG: 50 INJECTION, SOLUTION INTRAMUSCULAR; INTRAVENOUS at 12:30

## 2019-10-07 ASSESSMENT — PULMONARY FUNCTION TESTS
PIF_VALUE: 3
PIF_VALUE: 11
PIF_VALUE: 5
PIF_VALUE: 19
PIF_VALUE: 2
PIF_VALUE: 21
PIF_VALUE: 12
PIF_VALUE: 21
PIF_VALUE: 11
PIF_VALUE: 0
PIF_VALUE: 3
PIF_VALUE: 2
PIF_VALUE: 19
PIF_VALUE: 0
PIF_VALUE: 0
PIF_VALUE: 11
PIF_VALUE: 4
PIF_VALUE: 20
PIF_VALUE: 11
PIF_VALUE: 11
PIF_VALUE: 20
PIF_VALUE: 20
PIF_VALUE: 11
PIF_VALUE: 21
PIF_VALUE: 10
PIF_VALUE: 19
PIF_VALUE: 2
PIF_VALUE: 1
PIF_VALUE: 3
PIF_VALUE: 4
PIF_VALUE: 3
PIF_VALUE: 19
PIF_VALUE: 10
PIF_VALUE: 7
PIF_VALUE: 0
PIF_VALUE: 19
PIF_VALUE: 20
PIF_VALUE: 20
PIF_VALUE: 2

## 2019-10-07 ASSESSMENT — PAIN DESCRIPTION - LOCATION: LOCATION: VAGINA

## 2019-10-07 ASSESSMENT — PAIN SCALES - GENERAL
PAINLEVEL_OUTOF10: 9
PAINLEVEL_OUTOF10: 7
PAINLEVEL_OUTOF10: 2

## 2019-10-07 ASSESSMENT — ENCOUNTER SYMPTOMS: SHORTNESS OF BREATH: 1

## 2019-10-08 ENCOUNTER — APPOINTMENT (OUTPATIENT)
Dept: GENERAL RADIOLOGY | Age: 74
DRG: 987 | End: 2019-10-08
Payer: MEDICARE

## 2019-10-08 ENCOUNTER — HOSPITAL ENCOUNTER (INPATIENT)
Age: 74
LOS: 4 days | Discharge: HOME | DRG: 987 | End: 2019-10-12
Attending: EMERGENCY MEDICINE | Admitting: INTERNAL MEDICINE
Payer: MEDICARE

## 2019-10-08 DIAGNOSIS — J45.51 SEVERE PERSISTENT ASTHMA WITH EXACERBATION: ICD-10-CM

## 2019-10-08 DIAGNOSIS — I50.9 ACUTE CONGESTIVE HEART FAILURE, UNSPECIFIED HEART FAILURE TYPE (HCC): Primary | ICD-10-CM

## 2019-10-08 DIAGNOSIS — R77.8 TROPONIN LEVEL ELEVATED: ICD-10-CM

## 2019-10-08 PROBLEM — I50.31 ACUTE DIASTOLIC HEART FAILURE (HCC): Status: ACTIVE | Noted: 2019-10-08

## 2019-10-08 LAB
ANION GAP SERPL CALCULATED.3IONS-SCNC: 13 MEQ/L (ref 9–15)
ANISOCYTOSIS: ABNORMAL
BASOPHILS ABSOLUTE: 0.1 K/UL (ref 0–0.2)
BASOPHILS RELATIVE PERCENT: 0.6 %
BUN BLDV-MCNC: 20 MG/DL (ref 8–23)
CALCIUM SERPL-MCNC: 10 MG/DL (ref 8.5–9.9)
CHLORIDE BLD-SCNC: 105 MEQ/L (ref 95–107)
CO2: 23 MEQ/L (ref 20–31)
CREAT SERPL-MCNC: 0.95 MG/DL (ref 0.5–0.9)
EKG ATRIAL RATE: 78 BPM
EKG P AXIS: 44 DEGREES
EKG P-R INTERVAL: 126 MS
EKG Q-T INTERVAL: 390 MS
EKG QRS DURATION: 74 MS
EKG QTC CALCULATION (BAZETT): 444 MS
EKG R AXIS: 51 DEGREES
EKG T AXIS: 143 DEGREES
EKG VENTRICULAR RATE: 78 BPM
EOSINOPHILS ABSOLUTE: 0 K/UL (ref 0–0.7)
EOSINOPHILS RELATIVE PERCENT: 0.3 %
GFR AFRICAN AMERICAN: >60
GFR NON-AFRICAN AMERICAN: 57.3
GLUCOSE BLD-MCNC: 271 MG/DL (ref 70–99)
GLUCOSE BLD-MCNC: 418 MG/DL (ref 60–115)
GLUCOSE BLD-MCNC: 503 MG/DL (ref 60–115)
HBA1C MFR BLD: 8.6 % (ref 4.8–5.9)
HCT VFR BLD CALC: 33.4 % (ref 37–47)
HEMOGLOBIN: 10.7 G/DL (ref 12–16)
HYPOCHROMIA: ABNORMAL
LYMPHOCYTES ABSOLUTE: 1.6 K/UL (ref 1–4.8)
LYMPHOCYTES RELATIVE PERCENT: 12.1 %
MACROCYTES: ABNORMAL
MCH RBC QN AUTO: 23.3 PG (ref 27–31.3)
MCHC RBC AUTO-ENTMCNC: 31.9 % (ref 33–37)
MCV RBC AUTO: 72.9 FL (ref 82–100)
MICROCYTES: ABNORMAL
MONOCYTES ABSOLUTE: 0.6 K/UL (ref 0.2–0.8)
MONOCYTES RELATIVE PERCENT: 4.7 %
NEUTROPHILS ABSOLUTE: 11 K/UL (ref 1.4–6.5)
NEUTROPHILS RELATIVE PERCENT: 82.3 %
OVALOCYTES: ABNORMAL
PDW BLD-RTO: 19.7 % (ref 11.5–14.5)
PERFORMED ON: ABNORMAL
PERFORMED ON: ABNORMAL
PLATELET # BLD: 409 K/UL (ref 130–400)
PLATELET SLIDE REVIEW: ABNORMAL
POIKILOCYTES: ABNORMAL
POTASSIUM SERPL-SCNC: 4.7 MEQ/L (ref 3.4–4.9)
PRO-BNP: 4268 PG/ML
RBC # BLD: 4.58 M/UL (ref 4.2–5.4)
SLIDE REVIEW: ABNORMAL
SODIUM BLD-SCNC: 141 MEQ/L (ref 135–144)
TARGET CELLS: ABNORMAL
TROPONIN: 0.01 NG/ML (ref 0–0.01)
TROPONIN: <0.01 NG/ML (ref 0–0.01)
TROPONIN: <0.01 NG/ML (ref 0–0.01)
WBC # BLD: 13.4 K/UL (ref 4.8–10.8)

## 2019-10-08 PROCEDURE — 83880 ASSAY OF NATRIURETIC PEPTIDE: CPT

## 2019-10-08 PROCEDURE — 96375 TX/PRO/DX INJ NEW DRUG ADDON: CPT

## 2019-10-08 PROCEDURE — 2500000003 HC RX 250 WO HCPCS: Performed by: EMERGENCY MEDICINE

## 2019-10-08 PROCEDURE — 6370000000 HC RX 637 (ALT 250 FOR IP): Performed by: EMERGENCY MEDICINE

## 2019-10-08 PROCEDURE — 2580000003 HC RX 258: Performed by: EMERGENCY MEDICINE

## 2019-10-08 PROCEDURE — 94640 AIRWAY INHALATION TREATMENT: CPT

## 2019-10-08 PROCEDURE — 99285 EMERGENCY DEPT VISIT HI MDM: CPT

## 2019-10-08 PROCEDURE — 84484 ASSAY OF TROPONIN QUANT: CPT

## 2019-10-08 PROCEDURE — 93005 ELECTROCARDIOGRAM TRACING: CPT | Performed by: EMERGENCY MEDICINE

## 2019-10-08 PROCEDURE — 6360000002 HC RX W HCPCS: Performed by: EMERGENCY MEDICINE

## 2019-10-08 PROCEDURE — 82728 ASSAY OF FERRITIN: CPT

## 2019-10-08 PROCEDURE — 85025 COMPLETE CBC W/AUTO DIFF WBC: CPT

## 2019-10-08 PROCEDURE — 83550 IRON BINDING TEST: CPT

## 2019-10-08 PROCEDURE — 83036 HEMOGLOBIN GLYCOSYLATED A1C: CPT

## 2019-10-08 PROCEDURE — 80048 BASIC METABOLIC PNL TOTAL CA: CPT

## 2019-10-08 PROCEDURE — 6360000002 HC RX W HCPCS: Performed by: NURSE PRACTITIONER

## 2019-10-08 PROCEDURE — 2500000003 HC RX 250 WO HCPCS: Performed by: NURSE PRACTITIONER

## 2019-10-08 PROCEDURE — 2060000000 HC ICU INTERMEDIATE R&B

## 2019-10-08 PROCEDURE — 2580000003 HC RX 258: Performed by: NURSE PRACTITIONER

## 2019-10-08 PROCEDURE — 6370000000 HC RX 637 (ALT 250 FOR IP): Performed by: INTERNAL MEDICINE

## 2019-10-08 PROCEDURE — 6360000002 HC RX W HCPCS

## 2019-10-08 PROCEDURE — 71045 X-RAY EXAM CHEST 1 VIEW: CPT

## 2019-10-08 PROCEDURE — 36415 COLL VENOUS BLD VENIPUNCTURE: CPT

## 2019-10-08 PROCEDURE — 83540 ASSAY OF IRON: CPT

## 2019-10-08 PROCEDURE — 99223 1ST HOSP IP/OBS HIGH 75: CPT | Performed by: INTERNAL MEDICINE

## 2019-10-08 PROCEDURE — 96374 THER/PROPH/DIAG INJ IV PUSH: CPT

## 2019-10-08 PROCEDURE — 94660 CPAP INITIATION&MGMT: CPT

## 2019-10-08 PROCEDURE — 6370000000 HC RX 637 (ALT 250 FOR IP): Performed by: NURSE PRACTITIONER

## 2019-10-08 RX ORDER — LABETALOL 20 MG/4 ML (5 MG/ML) INTRAVENOUS SYRINGE
10 ONCE
Status: COMPLETED | OUTPATIENT
Start: 2019-10-08 | End: 2019-10-08

## 2019-10-08 RX ORDER — SODIUM CHLORIDE 0.9 % (FLUSH) 0.9 %
10 SYRINGE (ML) INJECTION EVERY 12 HOURS SCHEDULED
Status: DISCONTINUED | OUTPATIENT
Start: 2019-10-08 | End: 2019-10-12 | Stop reason: HOSPADM

## 2019-10-08 RX ORDER — LISINOPRIL 10 MG/1
10 TABLET ORAL 2 TIMES DAILY
Status: DISCONTINUED | OUTPATIENT
Start: 2019-10-08 | End: 2019-10-09

## 2019-10-08 RX ORDER — METOPROLOL SUCCINATE 100 MG/1
200 TABLET, EXTENDED RELEASE ORAL DAILY
Status: DISCONTINUED | OUTPATIENT
Start: 2019-10-08 | End: 2019-10-09

## 2019-10-08 RX ORDER — POTASSIUM CHLORIDE 7.45 MG/ML
10 INJECTION INTRAVENOUS PRN
Status: DISCONTINUED | OUTPATIENT
Start: 2019-10-08 | End: 2019-10-12 | Stop reason: HOSPADM

## 2019-10-08 RX ORDER — ONDANSETRON 2 MG/ML
4 INJECTION INTRAMUSCULAR; INTRAVENOUS EVERY 6 HOURS PRN
Status: DISCONTINUED | OUTPATIENT
Start: 2019-10-08 | End: 2019-10-09

## 2019-10-08 RX ORDER — ASPIRIN 81 MG/1
81 TABLET ORAL DAILY
Status: DISCONTINUED | OUTPATIENT
Start: 2019-10-08 | End: 2019-10-12 | Stop reason: HOSPADM

## 2019-10-08 RX ORDER — SODIUM CHLORIDE 0.9 % (FLUSH) 0.9 %
10 SYRINGE (ML) INJECTION PRN
Status: DISCONTINUED | OUTPATIENT
Start: 2019-10-08 | End: 2019-10-12 | Stop reason: HOSPADM

## 2019-10-08 RX ORDER — METHYLPREDNISOLONE SODIUM SUCCINATE 125 MG/2ML
INJECTION, POWDER, LYOPHILIZED, FOR SOLUTION INTRAMUSCULAR; INTRAVENOUS
Status: COMPLETED
Start: 2019-10-08 | End: 2019-10-08

## 2019-10-08 RX ORDER — ISOSORBIDE MONONITRATE 60 MG/1
60 TABLET, EXTENDED RELEASE ORAL DAILY
Status: DISCONTINUED | OUTPATIENT
Start: 2019-10-08 | End: 2019-10-10

## 2019-10-08 RX ORDER — METHYLPREDNISOLONE SODIUM SUCCINATE 125 MG/2ML
125 INJECTION, POWDER, LYOPHILIZED, FOR SOLUTION INTRAMUSCULAR; INTRAVENOUS ONCE
Status: COMPLETED | OUTPATIENT
Start: 2019-10-08 | End: 2019-10-08

## 2019-10-08 RX ORDER — ACETAMINOPHEN 325 MG/1
650 TABLET ORAL EVERY 4 HOURS PRN
Status: DISCONTINUED | OUTPATIENT
Start: 2019-10-08 | End: 2019-10-09

## 2019-10-08 RX ORDER — FUROSEMIDE 10 MG/ML
60 INJECTION INTRAMUSCULAR; INTRAVENOUS ONCE
Status: COMPLETED | OUTPATIENT
Start: 2019-10-08 | End: 2019-10-08

## 2019-10-08 RX ORDER — NICOTINE POLACRILEX 4 MG
15 LOZENGE BUCCAL PRN
Status: DISCONTINUED | OUTPATIENT
Start: 2019-10-08 | End: 2019-10-12 | Stop reason: HOSPADM

## 2019-10-08 RX ORDER — POTASSIUM CHLORIDE 20 MEQ/1
40 TABLET, EXTENDED RELEASE ORAL PRN
Status: DISCONTINUED | OUTPATIENT
Start: 2019-10-08 | End: 2019-10-12 | Stop reason: HOSPADM

## 2019-10-08 RX ORDER — CLONIDINE HYDROCHLORIDE 0.1 MG/1
0.2 TABLET ORAL ONCE
Status: COMPLETED | OUTPATIENT
Start: 2019-10-08 | End: 2019-10-08

## 2019-10-08 RX ORDER — NICOTINE 21 MG/24HR
1 PATCH, TRANSDERMAL 24 HOURS TRANSDERMAL DAILY
Status: DISCONTINUED | OUTPATIENT
Start: 2019-10-08 | End: 2019-10-12 | Stop reason: HOSPADM

## 2019-10-08 RX ORDER — FUROSEMIDE 10 MG/ML
40 INJECTION INTRAMUSCULAR; INTRAVENOUS 2 TIMES DAILY
Status: DISCONTINUED | OUTPATIENT
Start: 2019-10-08 | End: 2019-10-09

## 2019-10-08 RX ORDER — PAROXETINE HYDROCHLORIDE 20 MG/1
40 TABLET, FILM COATED ORAL EVERY MORNING
Status: DISCONTINUED | OUTPATIENT
Start: 2019-10-09 | End: 2019-10-12 | Stop reason: HOSPADM

## 2019-10-08 RX ORDER — DEXTROSE MONOHYDRATE 50 MG/ML
100 INJECTION, SOLUTION INTRAVENOUS PRN
Status: DISCONTINUED | OUTPATIENT
Start: 2019-10-08 | End: 2019-10-12 | Stop reason: HOSPADM

## 2019-10-08 RX ORDER — DEXTROSE MONOHYDRATE 25 G/50ML
12.5 INJECTION, SOLUTION INTRAVENOUS PRN
Status: DISCONTINUED | OUTPATIENT
Start: 2019-10-08 | End: 2019-10-12 | Stop reason: HOSPADM

## 2019-10-08 RX ORDER — LABETALOL 20 MG/4 ML (5 MG/ML) INTRAVENOUS SYRINGE
10 EVERY 4 HOURS PRN
Status: DISCONTINUED | OUTPATIENT
Start: 2019-10-08 | End: 2019-10-12 | Stop reason: HOSPADM

## 2019-10-08 RX ORDER — ROSUVASTATIN CALCIUM 40 MG/1
40 TABLET, COATED ORAL EVERY EVENING
Status: DISCONTINUED | OUTPATIENT
Start: 2019-10-08 | End: 2019-10-12 | Stop reason: HOSPADM

## 2019-10-08 RX ORDER — SODIUM CHLORIDE 0.9 % (FLUSH) 0.9 %
3 SYRINGE (ML) INJECTION EVERY 8 HOURS
Status: DISCONTINUED | OUTPATIENT
Start: 2019-10-08 | End: 2019-10-12 | Stop reason: HOSPADM

## 2019-10-08 RX ADMIN — FUROSEMIDE 60 MG: 10 INJECTION, SOLUTION INTRAMUSCULAR; INTRAVENOUS at 11:13

## 2019-10-08 RX ADMIN — INSULIN LISPRO 50 UNITS: 100 INJECTION, SUSPENSION SUBCUTANEOUS at 16:53

## 2019-10-08 RX ADMIN — METHYLPREDNISOLONE SODIUM SUCCINATE 125 MG: 125 INJECTION, POWDER, LYOPHILIZED, FOR SOLUTION INTRAMUSCULAR; INTRAVENOUS at 10:36

## 2019-10-08 RX ADMIN — Medication 3 ML: at 11:16

## 2019-10-08 RX ADMIN — INSULIN LISPRO 12 UNITS: 100 INJECTION, SOLUTION INTRAVENOUS; SUBCUTANEOUS at 16:52

## 2019-10-08 RX ADMIN — LABETALOL 20 MG/4 ML (5 MG/ML) INTRAVENOUS SYRINGE 10 MG: at 11:29

## 2019-10-08 RX ADMIN — CLONIDINE HYDROCHLORIDE 0.2 MG: 0.1 TABLET ORAL at 11:29

## 2019-10-08 RX ADMIN — ISOSORBIDE MONONITRATE 60 MG: 60 TABLET, EXTENDED RELEASE ORAL at 20:35

## 2019-10-08 RX ADMIN — IPRATROPIUM BROMIDE 0.5 MG: 0.5 SOLUTION RESPIRATORY (INHALATION) at 10:34

## 2019-10-08 RX ADMIN — INSULIN LISPRO 10 UNITS: 100 INJECTION, SUSPENSION SUBCUTANEOUS at 20:39

## 2019-10-08 RX ADMIN — METOPROLOL SUCCINATE 200 MG: 100 TABLET, EXTENDED RELEASE ORAL at 16:51

## 2019-10-08 RX ADMIN — Medication 10 ML: at 20:38

## 2019-10-08 RX ADMIN — FUROSEMIDE 40 MG: 10 INJECTION, SOLUTION INTRAMUSCULAR; INTRAVENOUS at 16:51

## 2019-10-08 RX ADMIN — ASPIRIN 81 MG: 81 TABLET, COATED ORAL at 16:51

## 2019-10-08 RX ADMIN — LISINOPRIL 10 MG: 10 TABLET ORAL at 20:35

## 2019-10-08 RX ADMIN — ENOXAPARIN SODIUM 40 MG: 40 INJECTION SUBCUTANEOUS at 16:51

## 2019-10-08 RX ADMIN — INSULIN LISPRO 6 UNITS: 100 INJECTION, SOLUTION INTRAVENOUS; SUBCUTANEOUS at 20:35

## 2019-10-08 RX ADMIN — METHYLPREDNISOLONE SODIUM SUCCINATE 125 MG: 125 INJECTION, POWDER, FOR SOLUTION INTRAMUSCULAR; INTRAVENOUS at 10:36

## 2019-10-08 RX ADMIN — ROSUVASTATIN CALCIUM 40 MG: 40 TABLET, FILM COATED ORAL at 20:50

## 2019-10-08 RX ADMIN — ALBUTEROL SULFATE 5 MG: 2.5 SOLUTION RESPIRATORY (INHALATION) at 10:35

## 2019-10-08 RX ADMIN — Medication 3 ML: at 16:56

## 2019-10-08 RX ADMIN — LABETALOL 20 MG/4 ML (5 MG/ML) INTRAVENOUS SYRINGE 10 MG: at 22:50

## 2019-10-08 ASSESSMENT — ENCOUNTER SYMPTOMS
VOMITING: 0
GASTROINTESTINAL NEGATIVE: 1
EYES NEGATIVE: 1
SORE THROAT: 0
BLOOD IN STOOL: 0
SHORTNESS OF BREATH: 1
CHEST TIGHTNESS: 1
WHEEZING: 1
BACK PAIN: 0
EYE PAIN: 0
NAUSEA: 0
EYE DISCHARGE: 0
COUGH: 0
STRIDOR: 0
ALLERGIC/IMMUNOLOGIC NEGATIVE: 1
ABDOMINAL PAIN: 0
CHEST TIGHTNESS: 0
SINUS PAIN: 0
ABDOMINAL DISTENTION: 0
DIARRHEA: 0

## 2019-10-08 ASSESSMENT — PAIN SCALES - GENERAL
PAINLEVEL_OUTOF10: 0
PAINLEVEL_OUTOF10: 0

## 2019-10-09 ENCOUNTER — APPOINTMENT (OUTPATIENT)
Dept: CARDIAC CATH/INVASIVE PROCEDURES | Age: 74
DRG: 987 | End: 2019-10-09
Payer: MEDICARE

## 2019-10-09 PROBLEM — I50.33 ACUTE ON CHRONIC DIASTOLIC HEART FAILURE (HCC): Status: ACTIVE | Noted: 2019-10-08

## 2019-10-09 PROBLEM — I16.0 HYPERTENSIVE URGENCY: Status: ACTIVE | Noted: 2019-10-09

## 2019-10-09 LAB
ANION GAP SERPL CALCULATED.3IONS-SCNC: 16 MEQ/L (ref 9–15)
BUN BLDV-MCNC: 31 MG/DL (ref 8–23)
CALCIUM SERPL-MCNC: 9.6 MG/DL (ref 8.5–9.9)
CHLORIDE BLD-SCNC: 104 MEQ/L (ref 95–107)
CHOLESTEROL, TOTAL: 155 MG/DL (ref 0–199)
CO2: 23 MEQ/L (ref 20–31)
CREAT SERPL-MCNC: 1.04 MG/DL (ref 0.5–0.9)
GFR AFRICAN AMERICAN: >60
GFR NON-AFRICAN AMERICAN: 51.6
GLUCOSE BLD-MCNC: 100 MG/DL (ref 60–115)
GLUCOSE BLD-MCNC: 145 MG/DL (ref 70–99)
GLUCOSE BLD-MCNC: 148 MG/DL (ref 60–115)
GLUCOSE BLD-MCNC: 153 MG/DL (ref 60–115)
GLUCOSE BLD-MCNC: 214 MG/DL (ref 60–115)
GLUCOSE BLD-MCNC: 346 MG/DL (ref 60–115)
HDLC SERPL-MCNC: 52 MG/DL (ref 40–59)
LDL CHOLESTEROL CALCULATED: 94 MG/DL (ref 0–129)
MAGNESIUM: 1.8 MG/DL (ref 1.7–2.4)
PERFORMED ON: ABNORMAL
PERFORMED ON: NORMAL
POTASSIUM SERPL-SCNC: 3.6 MEQ/L (ref 3.4–4.9)
SODIUM BLD-SCNC: 143 MEQ/L (ref 135–144)
TRIGL SERPL-MCNC: 46 MG/DL (ref 0–150)

## 2019-10-09 PROCEDURE — B2111ZZ FLUOROSCOPY OF MULTIPLE CORONARY ARTERIES USING LOW OSMOLAR CONTRAST: ICD-10-PCS | Performed by: INTERNAL MEDICINE

## 2019-10-09 PROCEDURE — C1894 INTRO/SHEATH, NON-LASER: HCPCS

## 2019-10-09 PROCEDURE — 2500000003 HC RX 250 WO HCPCS

## 2019-10-09 PROCEDURE — 83735 ASSAY OF MAGNESIUM: CPT

## 2019-10-09 PROCEDURE — 2580000003 HC RX 258: Performed by: EMERGENCY MEDICINE

## 2019-10-09 PROCEDURE — 6370000000 HC RX 637 (ALT 250 FOR IP): Performed by: NURSE PRACTITIONER

## 2019-10-09 PROCEDURE — 2709999900 HC NON-CHARGEABLE SUPPLY

## 2019-10-09 PROCEDURE — C1887 CATHETER, GUIDING: HCPCS

## 2019-10-09 PROCEDURE — 6370000000 HC RX 637 (ALT 250 FOR IP): Performed by: INTERNAL MEDICINE

## 2019-10-09 PROCEDURE — 36415 COLL VENOUS BLD VENIPUNCTURE: CPT

## 2019-10-09 PROCEDURE — 4A023N7 MEASUREMENT OF CARDIAC SAMPLING AND PRESSURE, LEFT HEART, PERCUTANEOUS APPROACH: ICD-10-PCS | Performed by: INTERNAL MEDICINE

## 2019-10-09 PROCEDURE — 2580000003 HC RX 258: Performed by: INTERNAL MEDICINE

## 2019-10-09 PROCEDURE — 80048 BASIC METABOLIC PNL TOTAL CA: CPT

## 2019-10-09 PROCEDURE — 99222 1ST HOSP IP/OBS MODERATE 55: CPT | Performed by: INTERNAL MEDICINE

## 2019-10-09 PROCEDURE — 93458 L HRT ARTERY/VENTRICLE ANGIO: CPT | Performed by: INTERNAL MEDICINE

## 2019-10-09 PROCEDURE — 2060000000 HC ICU INTERMEDIATE R&B

## 2019-10-09 PROCEDURE — 2500000003 HC RX 250 WO HCPCS: Performed by: NURSE PRACTITIONER

## 2019-10-09 PROCEDURE — 6360000004 HC RX CONTRAST MEDICATION: Performed by: INTERNAL MEDICINE

## 2019-10-09 PROCEDURE — 99233 SBSQ HOSP IP/OBS HIGH 50: CPT | Performed by: INTERNAL MEDICINE

## 2019-10-09 PROCEDURE — 80061 LIPID PANEL: CPT

## 2019-10-09 PROCEDURE — 6360000002 HC RX W HCPCS

## 2019-10-09 PROCEDURE — C1769 GUIDE WIRE: HCPCS

## 2019-10-09 PROCEDURE — B2151ZZ FLUOROSCOPY OF LEFT HEART USING LOW OSMOLAR CONTRAST: ICD-10-PCS | Performed by: INTERNAL MEDICINE

## 2019-10-09 PROCEDURE — 2580000003 HC RX 258: Performed by: NURSE PRACTITIONER

## 2019-10-09 PROCEDURE — 2580000003 HC RX 258

## 2019-10-09 RX ORDER — SODIUM CHLORIDE 0.9 % (FLUSH) 0.9 %
10 SYRINGE (ML) INJECTION PRN
Status: DISCONTINUED | OUTPATIENT
Start: 2019-10-09 | End: 2019-10-12 | Stop reason: HOSPADM

## 2019-10-09 RX ORDER — ONDANSETRON 2 MG/ML
4 INJECTION INTRAMUSCULAR; INTRAVENOUS EVERY 6 HOURS PRN
Status: DISCONTINUED | OUTPATIENT
Start: 2019-10-09 | End: 2019-10-12 | Stop reason: HOSPADM

## 2019-10-09 RX ORDER — ONDANSETRON 2 MG/ML
4 INJECTION INTRAMUSCULAR; INTRAVENOUS EVERY 6 HOURS PRN
Status: DISCONTINUED | OUTPATIENT
Start: 2019-10-09 | End: 2019-10-09

## 2019-10-09 RX ORDER — NITROGLYCERIN 0.4 MG/1
0.4 TABLET SUBLINGUAL EVERY 5 MIN PRN
Status: DISCONTINUED | OUTPATIENT
Start: 2019-10-09 | End: 2019-10-12 | Stop reason: HOSPADM

## 2019-10-09 RX ORDER — ACETAMINOPHEN 325 MG/1
650 TABLET ORAL EVERY 4 HOURS PRN
Status: DISCONTINUED | OUTPATIENT
Start: 2019-10-09 | End: 2019-10-12 | Stop reason: HOSPADM

## 2019-10-09 RX ORDER — DIPHENHYDRAMINE HYDROCHLORIDE 50 MG/ML
50 INJECTION INTRAMUSCULAR; INTRAVENOUS ONCE
Status: DISCONTINUED | OUTPATIENT
Start: 2019-10-09 | End: 2019-10-12 | Stop reason: HOSPADM

## 2019-10-09 RX ORDER — AMLODIPINE BESYLATE 10 MG/1
10 TABLET ORAL DAILY
Status: DISCONTINUED | OUTPATIENT
Start: 2019-10-09 | End: 2019-10-12 | Stop reason: HOSPADM

## 2019-10-09 RX ORDER — FUROSEMIDE 40 MG/1
40 TABLET ORAL DAILY
Status: DISCONTINUED | OUTPATIENT
Start: 2019-10-09 | End: 2019-10-12 | Stop reason: HOSPADM

## 2019-10-09 RX ORDER — SODIUM CHLORIDE 0.9 % (FLUSH) 0.9 %
10 SYRINGE (ML) INJECTION EVERY 12 HOURS SCHEDULED
Status: DISCONTINUED | OUTPATIENT
Start: 2019-10-09 | End: 2019-10-12 | Stop reason: HOSPADM

## 2019-10-09 RX ORDER — METOPROLOL SUCCINATE 100 MG/1
100 TABLET, EXTENDED RELEASE ORAL 3 TIMES DAILY
Status: DISCONTINUED | OUTPATIENT
Start: 2019-10-09 | End: 2019-10-10

## 2019-10-09 RX ORDER — LISINOPRIL 20 MG/1
20 TABLET ORAL 2 TIMES DAILY
Status: DISCONTINUED | OUTPATIENT
Start: 2019-10-09 | End: 2019-10-12 | Stop reason: HOSPADM

## 2019-10-09 RX ADMIN — INSULIN LISPRO 1 UNITS: 100 INJECTION, SOLUTION INTRAVENOUS; SUBCUTANEOUS at 21:21

## 2019-10-09 RX ADMIN — Medication 3 ML: at 03:32

## 2019-10-09 RX ADMIN — ROSUVASTATIN CALCIUM 40 MG: 40 TABLET, FILM COATED ORAL at 16:56

## 2019-10-09 RX ADMIN — ASPIRIN 81 MG: 81 TABLET, COATED ORAL at 08:32

## 2019-10-09 RX ADMIN — METOPROLOL SUCCINATE 200 MG: 100 TABLET, EXTENDED RELEASE ORAL at 08:33

## 2019-10-09 RX ADMIN — FUROSEMIDE 40 MG: 40 TABLET ORAL at 08:48

## 2019-10-09 RX ADMIN — METOPROLOL SUCCINATE 100 MG: 100 TABLET, EXTENDED RELEASE ORAL at 20:40

## 2019-10-09 RX ADMIN — AMLODIPINE BESYLATE 10 MG: 10 TABLET ORAL at 08:34

## 2019-10-09 RX ADMIN — Medication 10 ML: at 20:41

## 2019-10-09 RX ADMIN — Medication 10 ML: at 20:42

## 2019-10-09 RX ADMIN — Medication 3 ML: at 14:53

## 2019-10-09 RX ADMIN — PAROXETINE HYDROCHLORIDE 40 MG: 20 TABLET, FILM COATED ORAL at 08:33

## 2019-10-09 RX ADMIN — ACETAMINOPHEN 650 MG: 325 TABLET ORAL at 20:35

## 2019-10-09 RX ADMIN — Medication 10 ML: at 08:37

## 2019-10-09 RX ADMIN — IOVERSOL 110 ML: 678 INJECTION INTRA-ARTERIAL; INTRAVENOUS at 11:33

## 2019-10-09 RX ADMIN — LISINOPRIL 20 MG: 20 TABLET ORAL at 08:36

## 2019-10-09 RX ADMIN — INSULIN LISPRO 4 UNITS: 100 INJECTION, SOLUTION INTRAVENOUS; SUBCUTANEOUS at 16:51

## 2019-10-09 RX ADMIN — LISINOPRIL 20 MG: 20 TABLET ORAL at 20:41

## 2019-10-09 RX ADMIN — METOPROLOL SUCCINATE 100 MG: 100 TABLET, EXTENDED RELEASE ORAL at 15:51

## 2019-10-09 RX ADMIN — Medication 3 ML: at 16:57

## 2019-10-09 RX ADMIN — LABETALOL 20 MG/4 ML (5 MG/ML) INTRAVENOUS SYRINGE 10 MG: at 05:06

## 2019-10-09 RX ADMIN — ISOSORBIDE MONONITRATE 60 MG: 60 TABLET, EXTENDED RELEASE ORAL at 08:32

## 2019-10-09 ASSESSMENT — PAIN DESCRIPTION - DESCRIPTORS: DESCRIPTORS: ACHING

## 2019-10-09 ASSESSMENT — ENCOUNTER SYMPTOMS
CHEST TIGHTNESS: 1
BLOOD IN STOOL: 0
STRIDOR: 0
NAUSEA: 0
EYES NEGATIVE: 1
COUGH: 0
SHORTNESS OF BREATH: 1
GASTROINTESTINAL NEGATIVE: 1
WHEEZING: 0

## 2019-10-09 ASSESSMENT — PAIN DESCRIPTION - PAIN TYPE: TYPE: ACUTE PAIN

## 2019-10-09 ASSESSMENT — PAIN SCALES - GENERAL
PAINLEVEL_OUTOF10: 9
PAINLEVEL_OUTOF10: 0

## 2019-10-09 ASSESSMENT — PAIN DESCRIPTION - LOCATION: LOCATION: HEAD

## 2019-10-09 ASSESSMENT — PAIN DESCRIPTION - ORIENTATION: ORIENTATION: RIGHT

## 2019-10-10 ENCOUNTER — TELEPHONE (OUTPATIENT)
Dept: OBGYN CLINIC | Age: 74
End: 2019-10-10

## 2019-10-10 LAB
ANION GAP SERPL CALCULATED.3IONS-SCNC: 11 MEQ/L (ref 9–15)
BUN BLDV-MCNC: 26 MG/DL (ref 8–23)
CALCIUM SERPL-MCNC: 9 MG/DL (ref 8.5–9.9)
CHLORIDE BLD-SCNC: 105 MEQ/L (ref 95–107)
CO2: 25 MEQ/L (ref 20–31)
CREAT SERPL-MCNC: 0.92 MG/DL (ref 0.5–0.9)
FERRITIN: 9.9 NG/ML (ref 13–150)
GFR AFRICAN AMERICAN: >60
GFR NON-AFRICAN AMERICAN: 59.5
GLUCOSE BLD-MCNC: 185 MG/DL (ref 60–115)
GLUCOSE BLD-MCNC: 204 MG/DL (ref 60–115)
GLUCOSE BLD-MCNC: 362 MG/DL (ref 60–115)
GLUCOSE BLD-MCNC: 62 MG/DL (ref 60–115)
GLUCOSE BLD-MCNC: 78 MG/DL (ref 60–115)
GLUCOSE BLD-MCNC: 88 MG/DL (ref 60–115)
GLUCOSE BLD-MCNC: 97 MG/DL (ref 70–99)
IRON SATURATION: 6 % (ref 11–46)
IRON: 23 UG/DL (ref 37–145)
MAGNESIUM: 1.9 MG/DL (ref 1.7–2.4)
PERFORMED ON: ABNORMAL
PERFORMED ON: NORMAL
POTASSIUM SERPL-SCNC: 3.7 MEQ/L (ref 3.4–4.9)
SODIUM BLD-SCNC: 141 MEQ/L (ref 135–144)
TOTAL IRON BINDING CAPACITY: 415 UG/DL (ref 178–450)

## 2019-10-10 PROCEDURE — 83735 ASSAY OF MAGNESIUM: CPT

## 2019-10-10 PROCEDURE — 6370000000 HC RX 637 (ALT 250 FOR IP): Performed by: INTERNAL MEDICINE

## 2019-10-10 PROCEDURE — 2580000003 HC RX 258: Performed by: INTERNAL MEDICINE

## 2019-10-10 PROCEDURE — 2700000000 HC OXYGEN THERAPY PER DAY

## 2019-10-10 PROCEDURE — 93010 ELECTROCARDIOGRAM REPORT: CPT | Performed by: INTERNAL MEDICINE

## 2019-10-10 PROCEDURE — 2060000000 HC ICU INTERMEDIATE R&B

## 2019-10-10 PROCEDURE — 36415 COLL VENOUS BLD VENIPUNCTURE: CPT

## 2019-10-10 PROCEDURE — 99233 SBSQ HOSP IP/OBS HIGH 50: CPT | Performed by: INTERNAL MEDICINE

## 2019-10-10 PROCEDURE — 80048 BASIC METABOLIC PNL TOTAL CA: CPT

## 2019-10-10 PROCEDURE — 99232 SBSQ HOSP IP/OBS MODERATE 35: CPT | Performed by: INTERNAL MEDICINE

## 2019-10-10 RX ORDER — SPIRONOLACTONE 50 MG/1
50 TABLET, FILM COATED ORAL DAILY
Status: DISCONTINUED | OUTPATIENT
Start: 2019-10-10 | End: 2019-10-12 | Stop reason: HOSPADM

## 2019-10-10 RX ORDER — ISOSORBIDE MONONITRATE 60 MG/1
120 TABLET, EXTENDED RELEASE ORAL DAILY
Status: DISCONTINUED | OUTPATIENT
Start: 2019-10-10 | End: 2019-10-10

## 2019-10-10 RX ORDER — SPIRONOLACTONE 50 MG/1
50 TABLET, FILM COATED ORAL DAILY
Qty: 30 TABLET | Refills: 3 | Status: ON HOLD | OUTPATIENT
Start: 2019-10-11 | End: 2019-10-16 | Stop reason: HOSPADM

## 2019-10-10 RX ORDER — METOPROLOL SUCCINATE 200 MG/1
200 TABLET, EXTENDED RELEASE ORAL 2 TIMES DAILY
Qty: 30 TABLET | Refills: 3 | Status: ON HOLD | OUTPATIENT
Start: 2019-10-10 | End: 2019-10-16 | Stop reason: HOSPADM

## 2019-10-10 RX ORDER — FUROSEMIDE 40 MG/1
40 TABLET ORAL DAILY
Qty: 60 TABLET | Refills: 3 | Status: SHIPPED | OUTPATIENT
Start: 2019-10-11 | End: 2020-02-20 | Stop reason: SDUPTHER

## 2019-10-10 RX ORDER — HYDRALAZINE HYDROCHLORIDE 100 MG/1
100 TABLET, FILM COATED ORAL EVERY 12 HOURS SCHEDULED
Qty: 60 TABLET | Refills: 3 | Status: SHIPPED | OUTPATIENT
Start: 2019-10-10 | End: 2019-10-12 | Stop reason: HOSPADM

## 2019-10-10 RX ORDER — HYDRALAZINE HYDROCHLORIDE 100 MG/1
100 TABLET, FILM COATED ORAL EVERY 12 HOURS SCHEDULED
Status: DISCONTINUED | OUTPATIENT
Start: 2019-10-10 | End: 2019-10-12

## 2019-10-10 RX ORDER — METOPROLOL SUCCINATE 100 MG/1
200 TABLET, EXTENDED RELEASE ORAL 2 TIMES DAILY
Status: DISCONTINUED | OUTPATIENT
Start: 2019-10-10 | End: 2019-10-12 | Stop reason: HOSPADM

## 2019-10-10 RX ORDER — LISINOPRIL 20 MG/1
20 TABLET ORAL 2 TIMES DAILY
Qty: 30 TABLET | Refills: 3 | Status: ON HOLD | OUTPATIENT
Start: 2019-10-10 | End: 2019-10-16 | Stop reason: HOSPADM

## 2019-10-10 RX ORDER — DEXTROSE AND SODIUM CHLORIDE 5; .45 G/100ML; G/100ML
INJECTION, SOLUTION INTRAVENOUS CONTINUOUS
Status: DISPENSED | OUTPATIENT
Start: 2019-10-10 | End: 2019-10-11

## 2019-10-10 RX ORDER — AMLODIPINE BESYLATE 10 MG/1
10 TABLET ORAL DAILY
Qty: 30 TABLET | Refills: 3 | Status: ON HOLD | OUTPATIENT
Start: 2019-10-11 | End: 2019-10-16 | Stop reason: HOSPADM

## 2019-10-10 RX ADMIN — FUROSEMIDE 40 MG: 40 TABLET ORAL at 09:03

## 2019-10-10 RX ADMIN — AMLODIPINE BESYLATE 10 MG: 10 TABLET ORAL at 09:03

## 2019-10-10 RX ADMIN — PAROXETINE HYDROCHLORIDE 40 MG: 20 TABLET, FILM COATED ORAL at 09:03

## 2019-10-10 RX ADMIN — INSULIN LISPRO 5 UNITS: 100 INJECTION, SOLUTION INTRAVENOUS; SUBCUTANEOUS at 23:10

## 2019-10-10 RX ADMIN — METOPROLOL SUCCINATE 200 MG: 100 TABLET, EXTENDED RELEASE ORAL at 09:03

## 2019-10-10 RX ADMIN — DEXTROSE AND SODIUM CHLORIDE: 5; 450 INJECTION, SOLUTION INTRAVENOUS at 16:30

## 2019-10-10 RX ADMIN — SPIRONOLACTONE 50 MG: 50 TABLET ORAL at 09:03

## 2019-10-10 RX ADMIN — HYDRALAZINE HYDROCHLORIDE 100 MG: 100 TABLET, FILM COATED ORAL at 09:03

## 2019-10-10 RX ADMIN — ASPIRIN 81 MG: 81 TABLET, COATED ORAL at 09:03

## 2019-10-10 RX ADMIN — INSULIN LISPRO 2 UNITS: 100 INJECTION, SOLUTION INTRAVENOUS; SUBCUTANEOUS at 09:05

## 2019-10-10 RX ADMIN — LISINOPRIL 20 MG: 20 TABLET ORAL at 20:43

## 2019-10-10 RX ADMIN — METOPROLOL SUCCINATE 200 MG: 100 TABLET, EXTENDED RELEASE ORAL at 20:43

## 2019-10-10 RX ADMIN — LISINOPRIL 20 MG: 20 TABLET ORAL at 09:03

## 2019-10-10 RX ADMIN — DEXTROSE 50 % IN WATER (D50W) INTRAVENOUS SYRINGE 12.5 G: at 12:41

## 2019-10-10 RX ADMIN — Medication 10 ML: at 20:43

## 2019-10-10 RX ADMIN — ROSUVASTATIN CALCIUM 40 MG: 40 TABLET, FILM COATED ORAL at 20:43

## 2019-10-10 RX ADMIN — HYDRALAZINE HYDROCHLORIDE 100 MG: 100 TABLET, FILM COATED ORAL at 20:43

## 2019-10-10 ASSESSMENT — ENCOUNTER SYMPTOMS
NAUSEA: 0
WHEEZING: 0
EYES NEGATIVE: 1
COUGH: 0
GASTROINTESTINAL NEGATIVE: 1
STRIDOR: 0
BLOOD IN STOOL: 0

## 2019-10-10 ASSESSMENT — PAIN SCALES - GENERAL: PAINLEVEL_OUTOF10: 0

## 2019-10-11 LAB
ANION GAP SERPL CALCULATED.3IONS-SCNC: 13 MEQ/L (ref 9–15)
BUN BLDV-MCNC: 22 MG/DL (ref 8–23)
CALCIUM SERPL-MCNC: 8.9 MG/DL (ref 8.5–9.9)
CHLORIDE BLD-SCNC: 103 MEQ/L (ref 95–107)
CO2: 23 MEQ/L (ref 20–31)
CREAT SERPL-MCNC: 0.89 MG/DL (ref 0.5–0.9)
GFR AFRICAN AMERICAN: >60
GFR NON-AFRICAN AMERICAN: >60
GLUCOSE BLD-MCNC: 225 MG/DL (ref 60–115)
GLUCOSE BLD-MCNC: 232 MG/DL (ref 70–99)
GLUCOSE BLD-MCNC: 261 MG/DL (ref 60–115)
GLUCOSE BLD-MCNC: 304 MG/DL (ref 60–115)
GLUCOSE BLD-MCNC: 346 MG/DL (ref 60–115)
MAGNESIUM: 2 MG/DL (ref 1.7–2.4)
PERFORMED ON: ABNORMAL
POTASSIUM SERPL-SCNC: 3.7 MEQ/L (ref 3.4–4.9)
SODIUM BLD-SCNC: 139 MEQ/L (ref 135–144)

## 2019-10-11 PROCEDURE — 6370000000 HC RX 637 (ALT 250 FOR IP): Performed by: INTERNAL MEDICINE

## 2019-10-11 PROCEDURE — 99232 SBSQ HOSP IP/OBS MODERATE 35: CPT | Performed by: PHYSICIAN ASSISTANT

## 2019-10-11 PROCEDURE — 6360000002 HC RX W HCPCS: Performed by: INTERNAL MEDICINE

## 2019-10-11 PROCEDURE — 2060000000 HC ICU INTERMEDIATE R&B

## 2019-10-11 PROCEDURE — 80048 BASIC METABOLIC PNL TOTAL CA: CPT

## 2019-10-11 PROCEDURE — 99223 1ST HOSP IP/OBS HIGH 75: CPT | Performed by: INTERNAL MEDICINE

## 2019-10-11 PROCEDURE — 94640 AIRWAY INHALATION TREATMENT: CPT

## 2019-10-11 PROCEDURE — 36415 COLL VENOUS BLD VENIPUNCTURE: CPT

## 2019-10-11 PROCEDURE — 6370000000 HC RX 637 (ALT 250 FOR IP): Performed by: PHYSICIAN ASSISTANT

## 2019-10-11 PROCEDURE — 2580000003 HC RX 258: Performed by: INTERNAL MEDICINE

## 2019-10-11 PROCEDURE — 83735 ASSAY OF MAGNESIUM: CPT

## 2019-10-11 PROCEDURE — 94664 DEMO&/EVAL PT USE INHALER: CPT

## 2019-10-11 RX ORDER — BUDESONIDE 0.5 MG/2ML
0.5 INHALANT ORAL 2 TIMES DAILY
Status: DISCONTINUED | OUTPATIENT
Start: 2019-10-11 | End: 2019-10-12 | Stop reason: HOSPADM

## 2019-10-11 RX ORDER — IPRATROPIUM BROMIDE AND ALBUTEROL SULFATE 2.5; .5 MG/3ML; MG/3ML
1 SOLUTION RESPIRATORY (INHALATION)
Status: DISCONTINUED | OUTPATIENT
Start: 2019-10-11 | End: 2019-10-11

## 2019-10-11 RX ORDER — IPRATROPIUM BROMIDE AND ALBUTEROL SULFATE 2.5; .5 MG/3ML; MG/3ML
1 SOLUTION RESPIRATORY (INHALATION) 3 TIMES DAILY
Status: DISCONTINUED | OUTPATIENT
Start: 2019-10-11 | End: 2019-10-12 | Stop reason: HOSPADM

## 2019-10-11 RX ORDER — ALBUTEROL SULFATE 0.63 MG/3ML
0.63 SOLUTION RESPIRATORY (INHALATION) EVERY 4 HOURS PRN
Status: DISCONTINUED | OUTPATIENT
Start: 2019-10-11 | End: 2019-10-12 | Stop reason: HOSPADM

## 2019-10-11 RX ORDER — METHYLPREDNISOLONE SODIUM SUCCINATE 40 MG/ML
40 INJECTION, POWDER, LYOPHILIZED, FOR SOLUTION INTRAMUSCULAR; INTRAVENOUS EVERY 12 HOURS
Status: DISCONTINUED | OUTPATIENT
Start: 2019-10-11 | End: 2019-10-12 | Stop reason: HOSPADM

## 2019-10-11 RX ORDER — INSULIN GLARGINE 100 [IU]/ML
30 INJECTION, SOLUTION SUBCUTANEOUS NIGHTLY
Status: DISCONTINUED | OUTPATIENT
Start: 2019-10-11 | End: 2019-10-12 | Stop reason: HOSPADM

## 2019-10-11 RX ADMIN — PAROXETINE HYDROCHLORIDE 40 MG: 20 TABLET, FILM COATED ORAL at 08:43

## 2019-10-11 RX ADMIN — METHYLPREDNISOLONE SODIUM SUCCINATE 40 MG: 40 INJECTION, POWDER, FOR SOLUTION INTRAMUSCULAR; INTRAVENOUS at 09:48

## 2019-10-11 RX ADMIN — ASPIRIN 81 MG: 81 TABLET, COATED ORAL at 08:43

## 2019-10-11 RX ADMIN — INSULIN LISPRO 8 UNITS: 100 INJECTION, SOLUTION INTRAVENOUS; SUBCUTANEOUS at 17:29

## 2019-10-11 RX ADMIN — METOPROLOL SUCCINATE 200 MG: 100 TABLET, EXTENDED RELEASE ORAL at 21:47

## 2019-10-11 RX ADMIN — ROSUVASTATIN CALCIUM 40 MG: 40 TABLET, FILM COATED ORAL at 17:26

## 2019-10-11 RX ADMIN — IPRATROPIUM BROMIDE AND ALBUTEROL SULFATE 1 AMPULE: .5; 3 SOLUTION RESPIRATORY (INHALATION) at 20:24

## 2019-10-11 RX ADMIN — BUDESONIDE 500 MCG: 0.5 SUSPENSION RESPIRATORY (INHALATION) at 20:24

## 2019-10-11 RX ADMIN — HYDRALAZINE HYDROCHLORIDE 100 MG: 100 TABLET, FILM COATED ORAL at 21:47

## 2019-10-11 RX ADMIN — INSULIN GLARGINE 30 UNITS: 100 INJECTION, SOLUTION SUBCUTANEOUS at 21:48

## 2019-10-11 RX ADMIN — ACETAMINOPHEN 650 MG: 325 TABLET ORAL at 00:55

## 2019-10-11 RX ADMIN — IPRATROPIUM BROMIDE AND ALBUTEROL SULFATE 1 AMPULE: .5; 3 SOLUTION RESPIRATORY (INHALATION) at 13:56

## 2019-10-11 RX ADMIN — ENOXAPARIN SODIUM 40 MG: 40 INJECTION SUBCUTANEOUS at 08:43

## 2019-10-11 RX ADMIN — Medication 10 ML: at 09:48

## 2019-10-11 RX ADMIN — HYDRALAZINE HYDROCHLORIDE 100 MG: 100 TABLET, FILM COATED ORAL at 08:44

## 2019-10-11 RX ADMIN — LISINOPRIL 20 MG: 20 TABLET ORAL at 21:47

## 2019-10-11 RX ADMIN — FUROSEMIDE 40 MG: 40 TABLET ORAL at 08:44

## 2019-10-11 RX ADMIN — INSULIN LISPRO 4 UNITS: 100 INJECTION, SOLUTION INTRAVENOUS; SUBCUTANEOUS at 21:50

## 2019-10-11 RX ADMIN — SPIRONOLACTONE 50 MG: 50 TABLET ORAL at 08:51

## 2019-10-11 RX ADMIN — METHYLPREDNISOLONE SODIUM SUCCINATE 40 MG: 40 INJECTION, POWDER, FOR SOLUTION INTRAMUSCULAR; INTRAVENOUS at 21:47

## 2019-10-11 RX ADMIN — METOPROLOL SUCCINATE 200 MG: 100 TABLET, EXTENDED RELEASE ORAL at 08:43

## 2019-10-11 RX ADMIN — Medication 10 ML: at 08:46

## 2019-10-11 RX ADMIN — LISINOPRIL 20 MG: 20 TABLET ORAL at 08:43

## 2019-10-11 RX ADMIN — AMLODIPINE BESYLATE 10 MG: 10 TABLET ORAL at 08:43

## 2019-10-11 RX ADMIN — Medication 10 ML: at 09:49

## 2019-10-11 ASSESSMENT — PAIN SCALES - GENERAL
PAINLEVEL_OUTOF10: 4
PAINLEVEL_OUTOF10: 0

## 2019-10-12 VITALS
SYSTOLIC BLOOD PRESSURE: 191 MMHG | HEART RATE: 87 BPM | TEMPERATURE: 98.9 F | DIASTOLIC BLOOD PRESSURE: 74 MMHG | HEIGHT: 59 IN | OXYGEN SATURATION: 97 % | BODY MASS INDEX: 27.16 KG/M2 | WEIGHT: 134.7 LBS | RESPIRATION RATE: 16 BRPM

## 2019-10-12 PROBLEM — J44.1 CHRONIC OBSTRUCTIVE PULMONARY DISEASE WITH ACUTE EXACERBATION (HCC): Status: ACTIVE | Noted: 2019-10-12

## 2019-10-12 LAB
ANION GAP SERPL CALCULATED.3IONS-SCNC: 13 MEQ/L (ref 9–15)
BUN BLDV-MCNC: 28 MG/DL (ref 8–23)
CALCIUM SERPL-MCNC: 9.4 MG/DL (ref 8.5–9.9)
CHLORIDE BLD-SCNC: 100 MEQ/L (ref 95–107)
CO2: 22 MEQ/L (ref 20–31)
CREAT SERPL-MCNC: 0.93 MG/DL (ref 0.5–0.9)
GFR AFRICAN AMERICAN: >60
GFR NON-AFRICAN AMERICAN: 58.7
GLUCOSE BLD-MCNC: 126 MG/DL (ref 70–99)
GLUCOSE BLD-MCNC: 137 MG/DL (ref 60–115)
GLUCOSE BLD-MCNC: 155 MG/DL (ref 60–115)
MAGNESIUM: 2.1 MG/DL (ref 1.7–2.4)
PERFORMED ON: ABNORMAL
PERFORMED ON: ABNORMAL
POTASSIUM SERPL-SCNC: 4 MEQ/L (ref 3.4–4.9)
SODIUM BLD-SCNC: 135 MEQ/L (ref 135–144)

## 2019-10-12 PROCEDURE — 6370000000 HC RX 637 (ALT 250 FOR IP): Performed by: INTERNAL MEDICINE

## 2019-10-12 PROCEDURE — 94618 PULMONARY STRESS TESTING: CPT

## 2019-10-12 PROCEDURE — 6360000002 HC RX W HCPCS: Performed by: INTERNAL MEDICINE

## 2019-10-12 PROCEDURE — 99232 SBSQ HOSP IP/OBS MODERATE 35: CPT | Performed by: INTERNAL MEDICINE

## 2019-10-12 PROCEDURE — 80048 BASIC METABOLIC PNL TOTAL CA: CPT

## 2019-10-12 PROCEDURE — 83735 ASSAY OF MAGNESIUM: CPT

## 2019-10-12 PROCEDURE — 2580000003 HC RX 258: Performed by: INTERNAL MEDICINE

## 2019-10-12 PROCEDURE — 36415 COLL VENOUS BLD VENIPUNCTURE: CPT

## 2019-10-12 PROCEDURE — 94640 AIRWAY INHALATION TREATMENT: CPT

## 2019-10-12 PROCEDURE — 2700000000 HC OXYGEN THERAPY PER DAY

## 2019-10-12 RX ORDER — PREDNISONE 20 MG/1
40 TABLET ORAL DAILY
Qty: 10 TABLET | Refills: 0 | Status: ON HOLD | OUTPATIENT
Start: 2019-10-12 | End: 2019-10-16 | Stop reason: HOSPADM

## 2019-10-12 RX ORDER — HYDRALAZINE HYDROCHLORIDE 100 MG/1
100 TABLET, FILM COATED ORAL EVERY 8 HOURS SCHEDULED
Qty: 60 TABLET | Refills: 3 | Status: SHIPPED | OUTPATIENT
Start: 2019-10-12 | End: 2020-02-28

## 2019-10-12 RX ORDER — IPRATROPIUM BROMIDE AND ALBUTEROL SULFATE 2.5; .5 MG/3ML; MG/3ML
3 SOLUTION RESPIRATORY (INHALATION) 3 TIMES DAILY
Qty: 360 ML | Refills: 0 | Status: ON HOLD | OUTPATIENT
Start: 2019-10-12 | End: 2019-10-16 | Stop reason: HOSPADM

## 2019-10-12 RX ORDER — HYDRALAZINE HYDROCHLORIDE 100 MG/1
100 TABLET, FILM COATED ORAL EVERY 8 HOURS SCHEDULED
Status: DISCONTINUED | OUTPATIENT
Start: 2019-10-12 | End: 2019-10-12 | Stop reason: HOSPADM

## 2019-10-12 RX ADMIN — METOPROLOL SUCCINATE 200 MG: 100 TABLET, EXTENDED RELEASE ORAL at 09:12

## 2019-10-12 RX ADMIN — PAROXETINE HYDROCHLORIDE 40 MG: 20 TABLET, FILM COATED ORAL at 09:12

## 2019-10-12 RX ADMIN — LISINOPRIL 20 MG: 20 TABLET ORAL at 09:12

## 2019-10-12 RX ADMIN — BUDESONIDE 500 MCG: 0.5 SUSPENSION RESPIRATORY (INHALATION) at 07:20

## 2019-10-12 RX ADMIN — HYDRALAZINE HYDROCHLORIDE 100 MG: 100 TABLET, FILM COATED ORAL at 09:12

## 2019-10-12 RX ADMIN — AMLODIPINE BESYLATE 10 MG: 10 TABLET ORAL at 09:12

## 2019-10-12 RX ADMIN — ASPIRIN 81 MG: 81 TABLET, COATED ORAL at 09:12

## 2019-10-12 RX ADMIN — INSULIN LISPRO 2 UNITS: 100 INJECTION, SOLUTION INTRAVENOUS; SUBCUTANEOUS at 09:09

## 2019-10-12 RX ADMIN — ENOXAPARIN SODIUM 40 MG: 40 INJECTION SUBCUTANEOUS at 09:13

## 2019-10-12 RX ADMIN — IPRATROPIUM BROMIDE AND ALBUTEROL SULFATE 1 AMPULE: .5; 3 SOLUTION RESPIRATORY (INHALATION) at 07:20

## 2019-10-12 RX ADMIN — SPIRONOLACTONE 50 MG: 50 TABLET ORAL at 09:12

## 2019-10-12 RX ADMIN — Medication 10 ML: at 09:12

## 2019-10-12 RX ADMIN — FUROSEMIDE 40 MG: 40 TABLET ORAL at 09:12

## 2019-10-12 RX ADMIN — METHYLPREDNISOLONE SODIUM SUCCINATE 40 MG: 40 INJECTION, POWDER, FOR SOLUTION INTRAMUSCULAR; INTRAVENOUS at 09:41

## 2019-10-12 ASSESSMENT — ENCOUNTER SYMPTOMS
EYES NEGATIVE: 1
BLOOD IN STOOL: 0
NAUSEA: 0
GASTROINTESTINAL NEGATIVE: 1
COUGH: 0
STRIDOR: 0
WHEEZING: 0

## 2019-10-12 ASSESSMENT — PAIN SCALES - GENERAL
PAINLEVEL_OUTOF10: 0
PAINLEVEL_OUTOF10: 0

## 2019-10-13 ENCOUNTER — HOSPITAL ENCOUNTER (INPATIENT)
Age: 74
LOS: 3 days | Discharge: HOME HEALTH | DRG: 291 | End: 2019-10-16
Attending: STUDENT IN AN ORGANIZED HEALTH CARE EDUCATION/TRAINING PROGRAM | Admitting: FAMILY MEDICINE
Payer: MEDICARE

## 2019-10-13 ENCOUNTER — TELEPHONE (OUTPATIENT)
Dept: OTHER | Facility: CLINIC | Age: 74
End: 2019-10-13

## 2019-10-13 ENCOUNTER — CARE COORDINATION (OUTPATIENT)
Dept: CASE MANAGEMENT | Age: 74
End: 2019-10-13

## 2019-10-13 ENCOUNTER — APPOINTMENT (OUTPATIENT)
Dept: GENERAL RADIOLOGY | Age: 74
DRG: 291 | End: 2019-10-13
Payer: MEDICARE

## 2019-10-13 DIAGNOSIS — I50.1 PULMONARY EDEMA WITH CONGESTIVE HEART FAILURE (HCC): Primary | ICD-10-CM

## 2019-10-13 DIAGNOSIS — Z79.4 TYPE 2 DIABETES MELLITUS WITH HYPERGLYCEMIA, WITH LONG-TERM CURRENT USE OF INSULIN (HCC): ICD-10-CM

## 2019-10-13 DIAGNOSIS — J96.00 ACUTE RESPIRATORY FAILURE, UNSPECIFIED WHETHER WITH HYPOXIA OR HYPERCAPNIA (HCC): ICD-10-CM

## 2019-10-13 DIAGNOSIS — F17.200 TOBACCO DEPENDENCE: ICD-10-CM

## 2019-10-13 DIAGNOSIS — I21.4 NONTRANSMURAL MYOCARDIAL INFARCTION (HCC): ICD-10-CM

## 2019-10-13 DIAGNOSIS — E11.65 TYPE 2 DIABETES MELLITUS WITH HYPERGLYCEMIA, WITH LONG-TERM CURRENT USE OF INSULIN (HCC): ICD-10-CM

## 2019-10-13 PROBLEM — I50.33 ACUTE ON CHRONIC DIASTOLIC (CONGESTIVE) HEART FAILURE (HCC): Status: ACTIVE | Noted: 2019-10-13

## 2019-10-13 LAB
ALBUMIN SERPL-MCNC: 4.3 G/DL (ref 3.5–4.6)
ALP BLD-CCNC: 108 U/L (ref 40–130)
ALT SERPL-CCNC: 46 U/L (ref 0–33)
ANION GAP SERPL CALCULATED.3IONS-SCNC: 16 MEQ/L (ref 9–15)
ANISOCYTOSIS: ABNORMAL
APTT: 20 SEC (ref 24.4–36.8)
AST SERPL-CCNC: 31 U/L (ref 0–35)
BASE EXCESS ARTERIAL: -1 (ref -3–3)
BASOPHILS ABSOLUTE: 0 K/UL (ref 0–0.2)
BASOPHILS RELATIVE PERCENT: 0.3 %
BILIRUB SERPL-MCNC: 0.3 MG/DL (ref 0.2–0.7)
BILIRUBIN URINE: NEGATIVE
BLOOD, URINE: NEGATIVE
BUN BLDV-MCNC: 36 MG/DL (ref 8–23)
C-REACTIVE PROTEIN, HIGH SENSITIVITY: 1.3 MG/L (ref 0–5)
CALCIUM IONIZED: 1.28 MMOL/L (ref 1.12–1.32)
CALCIUM SERPL-MCNC: 10 MG/DL (ref 8.5–9.9)
CHLORIDE BLD-SCNC: 100 MEQ/L (ref 95–107)
CHOLESTEROL, TOTAL: 158 MG/DL (ref 0–199)
CLARITY: CLEAR
CO2: 24 MEQ/L (ref 20–31)
COLOR: YELLOW
CREAT SERPL-MCNC: 1.16 MG/DL (ref 0.5–0.9)
EKG ATRIAL RATE: 80 BPM
EKG P AXIS: 58 DEGREES
EKG P-R INTERVAL: 116 MS
EKG Q-T INTERVAL: 374 MS
EKG QRS DURATION: 84 MS
EKG QTC CALCULATION (BAZETT): 431 MS
EKG R AXIS: 44 DEGREES
EKG T AXIS: 136 DEGREES
EKG VENTRICULAR RATE: 80 BPM
EOSINOPHILS ABSOLUTE: 0 K/UL (ref 0–0.7)
EOSINOPHILS RELATIVE PERCENT: 0 %
GFR AFRICAN AMERICAN: 55.1
GFR AFRICAN AMERICAN: 59
GFR NON-AFRICAN AMERICAN: 45.5
GFR NON-AFRICAN AMERICAN: 48
GLOBULIN: 3.1 G/DL (ref 2.3–3.5)
GLUCOSE BLD-MCNC: 303 MG/DL (ref 60–115)
GLUCOSE BLD-MCNC: 320 MG/DL (ref 70–99)
GLUCOSE BLD-MCNC: 338 MG/DL (ref 60–115)
GLUCOSE BLD-MCNC: 383 MG/DL (ref 60–115)
GLUCOSE URINE: 250 MG/DL
HCO3 ARTERIAL: 24.3 MMOL/L (ref 21–29)
HCT VFR BLD CALC: 29.8 % (ref 37–47)
HDLC SERPL-MCNC: 61 MG/DL (ref 40–59)
HEMOGLOBIN: 10.7 GM/DL (ref 12–16)
HEMOGLOBIN: 9.4 G/DL (ref 12–16)
HYPOCHROMIA: ABNORMAL
INR BLD: 1
KETONES, URINE: NEGATIVE MG/DL
LACTATE: 2.06 MMOL/L (ref 0.4–2)
LACTIC ACID: 3.2 MMOL/L (ref 0.5–2.2)
LDL CHOLESTEROL CALCULATED: 73 MG/DL (ref 0–129)
LEUKOCYTE ESTERASE, URINE: NEGATIVE
LYMPHOCYTES ABSOLUTE: 0.7 K/UL (ref 1–4.8)
LYMPHOCYTES RELATIVE PERCENT: 5 %
MAGNESIUM: 2.2 MG/DL (ref 1.7–2.4)
MCH RBC QN AUTO: 22.3 PG (ref 27–31.3)
MCHC RBC AUTO-ENTMCNC: 31.4 % (ref 33–37)
MCV RBC AUTO: 70.9 FL (ref 82–100)
MICROCYTES: ABNORMAL
MONOCYTES ABSOLUTE: 1 K/UL (ref 0.2–0.8)
MONOCYTES RELATIVE PERCENT: 6.8 %
NEUTROPHILS ABSOLUTE: 12.4 K/UL (ref 1.4–6.5)
NEUTROPHILS RELATIVE PERCENT: 87.9 %
NITRITE, URINE: NEGATIVE
O2 SAT, ARTERIAL: 100 % (ref 93–100)
PCO2 ARTERIAL: 40 MM HG (ref 35–45)
PDW BLD-RTO: 19.6 % (ref 11.5–14.5)
PERFORMED ON: ABNORMAL
PH ARTERIAL: 7.39 (ref 7.35–7.45)
PH UA: 5 (ref 5–9)
PLATELET # BLD: 430 K/UL (ref 130–400)
PLATELET SLIDE REVIEW: ABNORMAL
PO2 ARTERIAL: 209 MM HG (ref 75–108)
POC CHLORIDE: 107 MEQ/L (ref 99–110)
POC CREATININE: 1.1 MG/DL (ref 0.6–1.2)
POC FIO2: 50
POC HEMATOCRIT: 31 % (ref 36–48)
POC POTASSIUM: 3.8 MEQ/L (ref 3.5–5.1)
POC SAMPLE TYPE: ABNORMAL
POC SODIUM: 138 MEQ/L (ref 136–145)
POTASSIUM SERPL-SCNC: 4 MEQ/L (ref 3.4–4.9)
PRO-BNP: 5971 PG/ML
PROTEIN UA: NEGATIVE MG/DL
PROTHROMBIN TIME: 13.4 SEC (ref 12.3–14.9)
RBC # BLD: 4.2 M/UL (ref 4.2–5.4)
SLIDE REVIEW: ABNORMAL
SODIUM BLD-SCNC: 140 MEQ/L (ref 135–144)
SPECIFIC GRAVITY UA: 1.01 (ref 1–1.03)
TCO2 ARTERIAL: 26 (ref 22–29)
TOTAL CK: 169 U/L (ref 0–170)
TOTAL PROTEIN: 7.4 G/DL (ref 6.3–8)
TRIGL SERPL-MCNC: 118 MG/DL (ref 0–150)
TROPONIN: 0.05 NG/ML (ref 0–0.01)
TROPONIN: 0.07 NG/ML (ref 0–0.01)
TROPONIN: 0.09 NG/ML (ref 0–0.01)
TSH SERPL DL<=0.05 MIU/L-ACNC: 1.2 UIU/ML (ref 0.44–3.86)
URINE REFLEX TO CULTURE: ABNORMAL
UROBILINOGEN, URINE: 0.2 E.U./DL
WBC # BLD: 14.1 K/UL (ref 4.8–10.8)

## 2019-10-13 PROCEDURE — 96374 THER/PROPH/DIAG INJ IV PUSH: CPT

## 2019-10-13 PROCEDURE — 83880 ASSAY OF NATRIURETIC PEPTIDE: CPT

## 2019-10-13 PROCEDURE — 80053 COMPREHEN METABOLIC PANEL: CPT

## 2019-10-13 PROCEDURE — 83735 ASSAY OF MAGNESIUM: CPT

## 2019-10-13 PROCEDURE — 6360000002 HC RX W HCPCS: Performed by: PHYSICIAN ASSISTANT

## 2019-10-13 PROCEDURE — 84443 ASSAY THYROID STIM HORMONE: CPT

## 2019-10-13 PROCEDURE — 83605 ASSAY OF LACTIC ACID: CPT

## 2019-10-13 PROCEDURE — 6370000000 HC RX 637 (ALT 250 FOR IP): Performed by: STUDENT IN AN ORGANIZED HEALTH CARE EDUCATION/TRAINING PROGRAM

## 2019-10-13 PROCEDURE — 85730 THROMBOPLASTIN TIME PARTIAL: CPT

## 2019-10-13 PROCEDURE — 84295 ASSAY OF SERUM SODIUM: CPT

## 2019-10-13 PROCEDURE — 82330 ASSAY OF CALCIUM: CPT

## 2019-10-13 PROCEDURE — 82565 ASSAY OF CREATININE: CPT

## 2019-10-13 PROCEDURE — 87040 BLOOD CULTURE FOR BACTERIA: CPT

## 2019-10-13 PROCEDURE — 81003 URINALYSIS AUTO W/O SCOPE: CPT

## 2019-10-13 PROCEDURE — 2580000003 HC RX 258: Performed by: PHYSICIAN ASSISTANT

## 2019-10-13 PROCEDURE — 85610 PROTHROMBIN TIME: CPT

## 2019-10-13 PROCEDURE — 85014 HEMATOCRIT: CPT

## 2019-10-13 PROCEDURE — 82435 ASSAY OF BLOOD CHLORIDE: CPT

## 2019-10-13 PROCEDURE — 94640 AIRWAY INHALATION TREATMENT: CPT

## 2019-10-13 PROCEDURE — 82550 ASSAY OF CK (CPK): CPT

## 2019-10-13 PROCEDURE — 84132 ASSAY OF SERUM POTASSIUM: CPT

## 2019-10-13 PROCEDURE — 85025 COMPLETE CBC W/AUTO DIFF WBC: CPT

## 2019-10-13 PROCEDURE — 2700000000 HC OXYGEN THERAPY PER DAY

## 2019-10-13 PROCEDURE — 2000000000 HC ICU R&B

## 2019-10-13 PROCEDURE — 80061 LIPID PANEL: CPT

## 2019-10-13 PROCEDURE — 51702 INSERT TEMP BLADDER CATH: CPT

## 2019-10-13 PROCEDURE — 94660 CPAP INITIATION&MGMT: CPT

## 2019-10-13 PROCEDURE — 0T9B70Z DRAINAGE OF BLADDER WITH DRAINAGE DEVICE, VIA NATURAL OR ARTIFICIAL OPENING: ICD-10-PCS | Performed by: STUDENT IN AN ORGANIZED HEALTH CARE EDUCATION/TRAINING PROGRAM

## 2019-10-13 PROCEDURE — 82803 BLOOD GASES ANY COMBINATION: CPT

## 2019-10-13 PROCEDURE — 71045 X-RAY EXAM CHEST 1 VIEW: CPT

## 2019-10-13 PROCEDURE — 86141 C-REACTIVE PROTEIN HS: CPT

## 2019-10-13 PROCEDURE — 36600 WITHDRAWAL OF ARTERIAL BLOOD: CPT

## 2019-10-13 PROCEDURE — 36415 COLL VENOUS BLD VENIPUNCTURE: CPT

## 2019-10-13 PROCEDURE — 99285 EMERGENCY DEPT VISIT HI MDM: CPT

## 2019-10-13 PROCEDURE — 6360000002 HC RX W HCPCS: Performed by: STUDENT IN AN ORGANIZED HEALTH CARE EDUCATION/TRAINING PROGRAM

## 2019-10-13 PROCEDURE — 93005 ELECTROCARDIOGRAM TRACING: CPT | Performed by: STUDENT IN AN ORGANIZED HEALTH CARE EDUCATION/TRAINING PROGRAM

## 2019-10-13 PROCEDURE — 84484 ASSAY OF TROPONIN QUANT: CPT

## 2019-10-13 PROCEDURE — 6370000000 HC RX 637 (ALT 250 FOR IP): Performed by: PHYSICIAN ASSISTANT

## 2019-10-13 RX ORDER — METHYLPREDNISOLONE SODIUM SUCCINATE 40 MG/ML
40 INJECTION, POWDER, LYOPHILIZED, FOR SOLUTION INTRAMUSCULAR; INTRAVENOUS DAILY
Status: DISCONTINUED | OUTPATIENT
Start: 2019-10-13 | End: 2019-10-14

## 2019-10-13 RX ORDER — ONDANSETRON 2 MG/ML
4 INJECTION INTRAMUSCULAR; INTRAVENOUS EVERY 6 HOURS PRN
Status: DISCONTINUED | OUTPATIENT
Start: 2019-10-13 | End: 2019-10-16 | Stop reason: HOSPADM

## 2019-10-13 RX ORDER — FUROSEMIDE 10 MG/ML
40 INJECTION INTRAMUSCULAR; INTRAVENOUS 2 TIMES DAILY
Status: DISCONTINUED | OUTPATIENT
Start: 2019-10-13 | End: 2019-10-15

## 2019-10-13 RX ORDER — SODIUM CHLORIDE 0.9 % (FLUSH) 0.9 %
10 SYRINGE (ML) INJECTION PRN
Status: DISCONTINUED | OUTPATIENT
Start: 2019-10-13 | End: 2019-10-16 | Stop reason: HOSPADM

## 2019-10-13 RX ORDER — SODIUM CHLORIDE 0.9 % (FLUSH) 0.9 %
10 SYRINGE (ML) INJECTION EVERY 12 HOURS SCHEDULED
Status: DISCONTINUED | OUTPATIENT
Start: 2019-10-13 | End: 2019-10-16 | Stop reason: HOSPADM

## 2019-10-13 RX ORDER — ASPIRIN 81 MG/1
324 TABLET, CHEWABLE ORAL ONCE
Status: COMPLETED | OUTPATIENT
Start: 2019-10-13 | End: 2019-10-13

## 2019-10-13 RX ORDER — IPRATROPIUM BROMIDE AND ALBUTEROL SULFATE 2.5; .5 MG/3ML; MG/3ML
1 SOLUTION RESPIRATORY (INHALATION) 4 TIMES DAILY
Status: DISCONTINUED | OUTPATIENT
Start: 2019-10-13 | End: 2019-10-14

## 2019-10-13 RX ORDER — FUROSEMIDE 10 MG/ML
20 INJECTION INTRAMUSCULAR; INTRAVENOUS ONCE
Status: COMPLETED | OUTPATIENT
Start: 2019-10-13 | End: 2019-10-13

## 2019-10-13 RX ADMIN — IPRATROPIUM BROMIDE AND ALBUTEROL SULFATE 1 AMPULE: .5; 3 SOLUTION RESPIRATORY (INHALATION) at 20:12

## 2019-10-13 RX ADMIN — METHYLPREDNISOLONE SODIUM SUCCINATE 40 MG: 40 INJECTION, POWDER, FOR SOLUTION INTRAMUSCULAR; INTRAVENOUS at 15:01

## 2019-10-13 RX ADMIN — ASPIRIN 81 MG 324 MG: 81 TABLET ORAL at 11:41

## 2019-10-13 RX ADMIN — ENOXAPARIN SODIUM 40 MG: 40 INJECTION SUBCUTANEOUS at 15:00

## 2019-10-13 RX ADMIN — Medication 10 ML: at 21:19

## 2019-10-13 RX ADMIN — FUROSEMIDE 40 MG: 10 INJECTION, SOLUTION INTRAMUSCULAR; INTRAVENOUS at 18:49

## 2019-10-13 RX ADMIN — FUROSEMIDE 20 MG: 10 INJECTION, SOLUTION INTRAVENOUS at 11:46

## 2019-10-13 RX ADMIN — IPRATROPIUM BROMIDE AND ALBUTEROL SULFATE 1 AMPULE: .5; 3 SOLUTION RESPIRATORY (INHALATION) at 15:40

## 2019-10-13 ASSESSMENT — ENCOUNTER SYMPTOMS
DIARRHEA: 0
VOMITING: 0
SINUS PRESSURE: 0
CHEST TIGHTNESS: 0
BACK PAIN: 0
TROUBLE SWALLOWING: 0
SHORTNESS OF BREATH: 1
ABDOMINAL PAIN: 0
COUGH: 1

## 2019-10-13 ASSESSMENT — PAIN SCALES - GENERAL: PAINLEVEL_OUTOF10: 0

## 2019-10-14 LAB
ANION GAP SERPL CALCULATED.3IONS-SCNC: 13 MEQ/L (ref 9–15)
BASOPHILS ABSOLUTE: 0 K/UL (ref 0–0.2)
BASOPHILS RELATIVE PERCENT: 0.2 %
BUN BLDV-MCNC: 40 MG/DL (ref 8–23)
CALCIUM SERPL-MCNC: 9.4 MG/DL (ref 8.5–9.9)
CHLORIDE BLD-SCNC: 98 MEQ/L (ref 95–107)
CO2: 27 MEQ/L (ref 20–31)
CREAT SERPL-MCNC: 1.11 MG/DL (ref 0.5–0.9)
EOSINOPHILS ABSOLUTE: 0 K/UL (ref 0–0.7)
EOSINOPHILS RELATIVE PERCENT: 0 %
GFR AFRICAN AMERICAN: 57.9
GFR NON-AFRICAN AMERICAN: 47.9
GLUCOSE BLD-MCNC: 231 MG/DL (ref 70–99)
GLUCOSE BLD-MCNC: 284 MG/DL (ref 60–115)
GLUCOSE BLD-MCNC: 306 MG/DL (ref 60–115)
GLUCOSE BLD-MCNC: 388 MG/DL (ref 60–115)
GLUCOSE BLD-MCNC: 489 MG/DL (ref 60–115)
HCT VFR BLD CALC: 26.2 % (ref 37–47)
HEMOGLOBIN: 8.4 G/DL (ref 12–16)
LYMPHOCYTES ABSOLUTE: 0.5 K/UL (ref 1–4.8)
LYMPHOCYTES RELATIVE PERCENT: 5.5 %
MCH RBC QN AUTO: 22.6 PG (ref 27–31.3)
MCHC RBC AUTO-ENTMCNC: 31.9 % (ref 33–37)
MCV RBC AUTO: 70.6 FL (ref 82–100)
MONOCYTES ABSOLUTE: 0.4 K/UL (ref 0.2–0.8)
MONOCYTES RELATIVE PERCENT: 4.3 %
NEUTROPHILS ABSOLUTE: 7.7 K/UL (ref 1.4–6.5)
NEUTROPHILS RELATIVE PERCENT: 90 %
PDW BLD-RTO: 19.1 % (ref 11.5–14.5)
PERFORMED ON: ABNORMAL
PLATELET # BLD: 393 K/UL (ref 130–400)
POTASSIUM REFLEX MAGNESIUM: 4.1 MEQ/L (ref 3.4–4.9)
RBC # BLD: 3.71 M/UL (ref 4.2–5.4)
SODIUM BLD-SCNC: 138 MEQ/L (ref 135–144)
WBC # BLD: 8.5 K/UL (ref 4.8–10.8)

## 2019-10-14 PROCEDURE — 6370000000 HC RX 637 (ALT 250 FOR IP): Performed by: INTERNAL MEDICINE

## 2019-10-14 PROCEDURE — 99223 1ST HOSP IP/OBS HIGH 75: CPT | Performed by: INTERNAL MEDICINE

## 2019-10-14 PROCEDURE — 2580000003 HC RX 258: Performed by: PHYSICIAN ASSISTANT

## 2019-10-14 PROCEDURE — 6360000002 HC RX W HCPCS: Performed by: PHYSICIAN ASSISTANT

## 2019-10-14 PROCEDURE — 36415 COLL VENOUS BLD VENIPUNCTURE: CPT

## 2019-10-14 PROCEDURE — 94760 N-INVAS EAR/PLS OXIMETRY 1: CPT

## 2019-10-14 PROCEDURE — 2060000000 HC ICU INTERMEDIATE R&B

## 2019-10-14 PROCEDURE — 94664 DEMO&/EVAL PT USE INHALER: CPT

## 2019-10-14 PROCEDURE — 97166 OT EVAL MOD COMPLEX 45 MIN: CPT

## 2019-10-14 PROCEDURE — 2700000000 HC OXYGEN THERAPY PER DAY

## 2019-10-14 PROCEDURE — 80048 BASIC METABOLIC PNL TOTAL CA: CPT

## 2019-10-14 PROCEDURE — 93010 ELECTROCARDIOGRAM REPORT: CPT | Performed by: INTERNAL MEDICINE

## 2019-10-14 PROCEDURE — 94640 AIRWAY INHALATION TREATMENT: CPT

## 2019-10-14 PROCEDURE — 97162 PT EVAL MOD COMPLEX 30 MIN: CPT

## 2019-10-14 PROCEDURE — 6370000000 HC RX 637 (ALT 250 FOR IP): Performed by: PHYSICIAN ASSISTANT

## 2019-10-14 PROCEDURE — 85025 COMPLETE CBC W/AUTO DIFF WBC: CPT

## 2019-10-14 RX ORDER — ALBUTEROL SULFATE 2.5 MG/3ML
2.5 SOLUTION RESPIRATORY (INHALATION)
Status: DISCONTINUED | OUTPATIENT
Start: 2019-10-14 | End: 2019-10-16 | Stop reason: HOSPADM

## 2019-10-14 RX ORDER — CARVEDILOL 25 MG/1
25 TABLET ORAL 2 TIMES DAILY
Status: DISCONTINUED | OUTPATIENT
Start: 2019-10-14 | End: 2019-10-16 | Stop reason: HOSPADM

## 2019-10-14 RX ORDER — LISINOPRIL 10 MG/1
10 TABLET ORAL DAILY
Status: DISCONTINUED | OUTPATIENT
Start: 2019-10-14 | End: 2019-10-15

## 2019-10-14 RX ORDER — IPRATROPIUM BROMIDE AND ALBUTEROL SULFATE 2.5; .5 MG/3ML; MG/3ML
1 SOLUTION RESPIRATORY (INHALATION) 3 TIMES DAILY
Status: DISCONTINUED | OUTPATIENT
Start: 2019-10-14 | End: 2019-10-16 | Stop reason: HOSPADM

## 2019-10-14 RX ADMIN — Medication 10 ML: at 08:02

## 2019-10-14 RX ADMIN — CARVEDILOL 25 MG: 25 TABLET, FILM COATED ORAL at 21:46

## 2019-10-14 RX ADMIN — METHYLPREDNISOLONE SODIUM SUCCINATE 40 MG: 40 INJECTION, POWDER, FOR SOLUTION INTRAMUSCULAR; INTRAVENOUS at 08:01

## 2019-10-14 RX ADMIN — ENOXAPARIN SODIUM 40 MG: 40 INJECTION SUBCUTANEOUS at 15:08

## 2019-10-14 RX ADMIN — CARVEDILOL 25 MG: 25 TABLET, FILM COATED ORAL at 12:13

## 2019-10-14 RX ADMIN — FUROSEMIDE 40 MG: 10 INJECTION, SOLUTION INTRAMUSCULAR; INTRAVENOUS at 17:51

## 2019-10-14 RX ADMIN — FUROSEMIDE 40 MG: 10 INJECTION, SOLUTION INTRAMUSCULAR; INTRAVENOUS at 08:01

## 2019-10-14 RX ADMIN — IPRATROPIUM BROMIDE AND ALBUTEROL SULFATE 1 AMPULE: .5; 3 SOLUTION RESPIRATORY (INHALATION) at 04:15

## 2019-10-14 RX ADMIN — IPRATROPIUM BROMIDE AND ALBUTEROL SULFATE 1 AMPULE: .5; 3 SOLUTION RESPIRATORY (INHALATION) at 10:22

## 2019-10-14 RX ADMIN — LISINOPRIL 10 MG: 10 TABLET ORAL at 12:13

## 2019-10-14 RX ADMIN — IPRATROPIUM BROMIDE AND ALBUTEROL SULFATE 1 AMPULE: .5; 3 SOLUTION RESPIRATORY (INHALATION) at 20:29

## 2019-10-14 RX ADMIN — Medication 10 ML: at 21:46

## 2019-10-14 ASSESSMENT — PAIN SCALES - GENERAL
PAINLEVEL_OUTOF10: 0

## 2019-10-14 ASSESSMENT — ENCOUNTER SYMPTOMS
CHEST TIGHTNESS: 0
NAUSEA: 0
COUGH: 0
SHORTNESS OF BREATH: 1
WHEEZING: 0
GASTROINTESTINAL NEGATIVE: 1
STRIDOR: 0
EYES NEGATIVE: 1
BLOOD IN STOOL: 0

## 2019-10-15 ENCOUNTER — APPOINTMENT (OUTPATIENT)
Dept: GENERAL RADIOLOGY | Age: 74
DRG: 291 | End: 2019-10-15
Payer: MEDICARE

## 2019-10-15 LAB
GLUCOSE BLD-MCNC: 245 MG/DL (ref 60–115)
GLUCOSE BLD-MCNC: 260 MG/DL (ref 60–115)
GLUCOSE BLD-MCNC: 262 MG/DL (ref 60–115)
GLUCOSE BLD-MCNC: 277 MG/DL (ref 60–115)
GLUCOSE BLD-MCNC: 324 MG/DL (ref 60–115)
GLUCOSE BLD-MCNC: 374 MG/DL (ref 60–115)
PERFORMED ON: ABNORMAL

## 2019-10-15 PROCEDURE — 94640 AIRWAY INHALATION TREATMENT: CPT

## 2019-10-15 PROCEDURE — 2060000000 HC ICU INTERMEDIATE R&B

## 2019-10-15 PROCEDURE — 99232 SBSQ HOSP IP/OBS MODERATE 35: CPT | Performed by: INTERNAL MEDICINE

## 2019-10-15 PROCEDURE — 51702 INSERT TEMP BLADDER CATH: CPT

## 2019-10-15 PROCEDURE — 2700000000 HC OXYGEN THERAPY PER DAY

## 2019-10-15 PROCEDURE — 99233 SBSQ HOSP IP/OBS HIGH 50: CPT | Performed by: INTERNAL MEDICINE

## 2019-10-15 PROCEDURE — 6370000000 HC RX 637 (ALT 250 FOR IP): Performed by: INTERNAL MEDICINE

## 2019-10-15 PROCEDURE — 94760 N-INVAS EAR/PLS OXIMETRY 1: CPT

## 2019-10-15 PROCEDURE — 97116 GAIT TRAINING THERAPY: CPT

## 2019-10-15 PROCEDURE — 6360000002 HC RX W HCPCS: Performed by: PHYSICIAN ASSISTANT

## 2019-10-15 PROCEDURE — 71045 X-RAY EXAM CHEST 1 VIEW: CPT

## 2019-10-15 PROCEDURE — 2580000003 HC RX 258: Performed by: PHYSICIAN ASSISTANT

## 2019-10-15 RX ORDER — ATORVASTATIN CALCIUM 10 MG/1
10 TABLET, FILM COATED ORAL NIGHTLY
Status: DISCONTINUED | OUTPATIENT
Start: 2019-10-15 | End: 2019-10-16 | Stop reason: HOSPADM

## 2019-10-15 RX ORDER — FUROSEMIDE 40 MG/1
40 TABLET ORAL DAILY
Status: DISCONTINUED | OUTPATIENT
Start: 2019-10-15 | End: 2019-10-16 | Stop reason: HOSPADM

## 2019-10-15 RX ORDER — HYDRALAZINE HYDROCHLORIDE 100 MG/1
100 TABLET, FILM COATED ORAL EVERY 8 HOURS SCHEDULED
Status: DISCONTINUED | OUTPATIENT
Start: 2019-10-15 | End: 2019-10-16 | Stop reason: HOSPADM

## 2019-10-15 RX ORDER — LISINOPRIL 10 MG/1
10 TABLET ORAL 2 TIMES DAILY
Status: DISCONTINUED | OUTPATIENT
Start: 2019-10-15 | End: 2019-10-16 | Stop reason: HOSPADM

## 2019-10-15 RX ORDER — ASPIRIN 81 MG/1
81 TABLET ORAL DAILY
Status: DISCONTINUED | OUTPATIENT
Start: 2019-10-15 | End: 2019-10-16 | Stop reason: HOSPADM

## 2019-10-15 RX ORDER — SPIRONOLACTONE 25 MG/1
25 TABLET ORAL DAILY
Status: DISCONTINUED | OUTPATIENT
Start: 2019-10-15 | End: 2019-10-16 | Stop reason: HOSPADM

## 2019-10-15 RX ADMIN — IPRATROPIUM BROMIDE AND ALBUTEROL SULFATE 1 AMPULE: .5; 3 SOLUTION RESPIRATORY (INHALATION) at 12:22

## 2019-10-15 RX ADMIN — CARVEDILOL 25 MG: 25 TABLET, FILM COATED ORAL at 08:41

## 2019-10-15 RX ADMIN — ASPIRIN 81 MG: 81 TABLET, COATED ORAL at 11:01

## 2019-10-15 RX ADMIN — VERAPAMIL HYDROCHLORIDE 120 MG: 120 TABLET, FILM COATED, EXTENDED RELEASE ORAL at 20:55

## 2019-10-15 RX ADMIN — SPIRONOLACTONE 25 MG: 25 TABLET ORAL at 11:01

## 2019-10-15 RX ADMIN — HYDRALAZINE HYDROCHLORIDE 100 MG: 100 TABLET, FILM COATED ORAL at 20:56

## 2019-10-15 RX ADMIN — LISINOPRIL 10 MG: 10 TABLET ORAL at 20:56

## 2019-10-15 RX ADMIN — CARVEDILOL 25 MG: 25 TABLET, FILM COATED ORAL at 17:29

## 2019-10-15 RX ADMIN — HYDRALAZINE HYDROCHLORIDE 100 MG: 100 TABLET, FILM COATED ORAL at 13:55

## 2019-10-15 RX ADMIN — VERAPAMIL HYDROCHLORIDE 120 MG: 120 TABLET, FILM COATED, EXTENDED RELEASE ORAL at 11:01

## 2019-10-15 RX ADMIN — Medication 10 ML: at 08:42

## 2019-10-15 RX ADMIN — ENOXAPARIN SODIUM 40 MG: 40 INJECTION SUBCUTANEOUS at 13:55

## 2019-10-15 RX ADMIN — LISINOPRIL 10 MG: 10 TABLET ORAL at 08:41

## 2019-10-15 RX ADMIN — IPRATROPIUM BROMIDE AND ALBUTEROL SULFATE 1 AMPULE: .5; 3 SOLUTION RESPIRATORY (INHALATION) at 07:42

## 2019-10-15 RX ADMIN — FUROSEMIDE 40 MG: 10 INJECTION, SOLUTION INTRAMUSCULAR; INTRAVENOUS at 08:41

## 2019-10-15 RX ADMIN — ATORVASTATIN CALCIUM 10 MG: 10 TABLET, FILM COATED ORAL at 20:56

## 2019-10-15 RX ADMIN — IPRATROPIUM BROMIDE AND ALBUTEROL SULFATE 1 AMPULE: .5; 3 SOLUTION RESPIRATORY (INHALATION) at 20:36

## 2019-10-15 RX ADMIN — Medication 10 ML: at 20:56

## 2019-10-15 ASSESSMENT — PAIN SCALES - GENERAL
PAINLEVEL_OUTOF10: 0

## 2019-10-16 ENCOUNTER — TELEPHONE (OUTPATIENT)
Dept: FAMILY MEDICINE CLINIC | Age: 74
End: 2019-10-16

## 2019-10-16 VITALS
BODY MASS INDEX: 29.21 KG/M2 | TEMPERATURE: 98.2 F | HEART RATE: 69 BPM | DIASTOLIC BLOOD PRESSURE: 52 MMHG | OXYGEN SATURATION: 100 % | RESPIRATION RATE: 16 BRPM | SYSTOLIC BLOOD PRESSURE: 114 MMHG | WEIGHT: 144.9 LBS | HEIGHT: 59 IN

## 2019-10-16 LAB
ANION GAP SERPL CALCULATED.3IONS-SCNC: 12 MEQ/L (ref 9–15)
ANISOCYTOSIS: ABNORMAL
BASOPHILS ABSOLUTE: 0 K/UL (ref 0–0.2)
BASOPHILS RELATIVE PERCENT: 0.5 %
BUN BLDV-MCNC: 50 MG/DL (ref 8–23)
CALCIUM SERPL-MCNC: 9.3 MG/DL (ref 8.5–9.9)
CHLORIDE BLD-SCNC: 96 MEQ/L (ref 95–107)
CO2: 31 MEQ/L (ref 20–31)
CREAT SERPL-MCNC: 1.71 MG/DL (ref 0.5–0.9)
EOSINOPHILS ABSOLUTE: 0.1 K/UL (ref 0–0.7)
EOSINOPHILS RELATIVE PERCENT: 1.1 %
GFR AFRICAN AMERICAN: 35.2
GFR NON-AFRICAN AMERICAN: 29.1
GLUCOSE BLD-MCNC: 202 MG/DL (ref 70–99)
GLUCOSE BLD-MCNC: 252 MG/DL (ref 60–115)
GLUCOSE BLD-MCNC: 318 MG/DL (ref 60–115)
HCT VFR BLD CALC: 31.5 % (ref 37–47)
HEMOGLOBIN: 9.8 G/DL (ref 12–16)
HYPOCHROMIA: ABNORMAL
LYMPHOCYTES ABSOLUTE: 1.3 K/UL (ref 1–4.8)
LYMPHOCYTES RELATIVE PERCENT: 18.3 %
MCH RBC QN AUTO: 22.5 PG (ref 27–31.3)
MCHC RBC AUTO-ENTMCNC: 31.3 % (ref 33–37)
MCV RBC AUTO: 72 FL (ref 82–100)
MICROCYTES: ABNORMAL
MONOCYTES ABSOLUTE: 0.9 K/UL (ref 0.2–0.8)
MONOCYTES RELATIVE PERCENT: 12.2 %
NEUTROPHILS ABSOLUTE: 4.9 K/UL (ref 1.4–6.5)
NEUTROPHILS RELATIVE PERCENT: 67.9 %
PDW BLD-RTO: 19 % (ref 11.5–14.5)
PERFORMED ON: ABNORMAL
PERFORMED ON: ABNORMAL
PLATELET # BLD: 453 K/UL (ref 130–400)
POTASSIUM REFLEX MAGNESIUM: 4.4 MEQ/L (ref 3.4–4.9)
RBC # BLD: 4.37 M/UL (ref 4.2–5.4)
SLIDE REVIEW: ABNORMAL
SODIUM BLD-SCNC: 139 MEQ/L (ref 135–144)
WBC # BLD: 7.2 K/UL (ref 4.8–10.8)

## 2019-10-16 PROCEDURE — 99232 SBSQ HOSP IP/OBS MODERATE 35: CPT | Performed by: INTERNAL MEDICINE

## 2019-10-16 PROCEDURE — 97535 SELF CARE MNGMENT TRAINING: CPT

## 2019-10-16 PROCEDURE — 36415 COLL VENOUS BLD VENIPUNCTURE: CPT

## 2019-10-16 PROCEDURE — 51702 INSERT TEMP BLADDER CATH: CPT

## 2019-10-16 PROCEDURE — 6370000000 HC RX 637 (ALT 250 FOR IP): Performed by: INTERNAL MEDICINE

## 2019-10-16 PROCEDURE — 94640 AIRWAY INHALATION TREATMENT: CPT

## 2019-10-16 PROCEDURE — 97116 GAIT TRAINING THERAPY: CPT

## 2019-10-16 PROCEDURE — 80048 BASIC METABOLIC PNL TOTAL CA: CPT

## 2019-10-16 PROCEDURE — 90686 IIV4 VACC NO PRSV 0.5 ML IM: CPT | Performed by: FAMILY MEDICINE

## 2019-10-16 PROCEDURE — 99233 SBSQ HOSP IP/OBS HIGH 50: CPT | Performed by: INTERNAL MEDICINE

## 2019-10-16 PROCEDURE — 6360000002 HC RX W HCPCS: Performed by: FAMILY MEDICINE

## 2019-10-16 PROCEDURE — 85025 COMPLETE CBC W/AUTO DIFF WBC: CPT

## 2019-10-16 PROCEDURE — 2580000003 HC RX 258: Performed by: PHYSICIAN ASSISTANT

## 2019-10-16 PROCEDURE — G0008 ADMIN INFLUENZA VIRUS VAC: HCPCS | Performed by: FAMILY MEDICINE

## 2019-10-16 RX ORDER — AMLODIPINE BESYLATE 10 MG/1
10 TABLET ORAL DAILY
Status: ON HOLD | COMMUNITY
End: 2019-10-16 | Stop reason: HOSPADM

## 2019-10-16 RX ORDER — SPIRONOLACTONE 25 MG/1
25 TABLET ORAL DAILY
Qty: 30 TABLET | Refills: 3 | Status: SHIPPED | OUTPATIENT
Start: 2019-10-17 | End: 2020-07-08 | Stop reason: ALTCHOICE

## 2019-10-16 RX ORDER — CARVEDILOL 25 MG/1
25 TABLET ORAL 2 TIMES DAILY
Qty: 60 TABLET | Refills: 3 | Status: SHIPPED | OUTPATIENT
Start: 2019-10-16 | End: 2020-05-11 | Stop reason: SDUPTHER

## 2019-10-16 RX ORDER — LISINOPRIL 10 MG/1
10 TABLET ORAL 2 TIMES DAILY
Qty: 30 TABLET | Refills: 3 | Status: ON HOLD | OUTPATIENT
Start: 2019-10-16 | End: 2020-07-03 | Stop reason: HOSPADM

## 2019-10-16 RX ORDER — LISINOPRIL 20 MG/1
20 TABLET ORAL DAILY
Status: ON HOLD | COMMUNITY
End: 2019-10-16 | Stop reason: HOSPADM

## 2019-10-16 RX ORDER — SPIRONOLACTONE 50 MG/1
50 TABLET, FILM COATED ORAL DAILY
Status: ON HOLD | COMMUNITY
End: 2019-10-16 | Stop reason: HOSPADM

## 2019-10-16 RX ADMIN — IPRATROPIUM BROMIDE AND ALBUTEROL SULFATE 1 AMPULE: .5; 3 SOLUTION RESPIRATORY (INHALATION) at 08:25

## 2019-10-16 RX ADMIN — ASPIRIN 81 MG: 81 TABLET, COATED ORAL at 09:34

## 2019-10-16 RX ADMIN — LISINOPRIL 10 MG: 10 TABLET ORAL at 09:34

## 2019-10-16 RX ADMIN — Medication 10 ML: at 11:59

## 2019-10-16 RX ADMIN — FUROSEMIDE 40 MG: 40 TABLET ORAL at 09:35

## 2019-10-16 RX ADMIN — SPIRONOLACTONE 25 MG: 25 TABLET ORAL at 09:34

## 2019-10-16 RX ADMIN — INFLUENZA A VIRUS A/BRISBANE/02/2018 IVR-190 (H1N1) ANTIGEN (PROPIOLACTONE INACTIVATED), INFLUENZA A VIRUS A/KANSAS/14/2017 X-327 (H3N2) ANTIGEN (PROPIOLACTONE INACTIVATED), INFLUENZA B VIRUS B/MARYLAND/15/2016 ANTIGEN (PROPIOLACTONE INACTIVATED), INFLUENZA B VIRUS B/PHUKET/3073/2013 BVR-1B ANTIGEN (PROPIOLACTONE INACTIVATED) 0.5 ML: 15; 15; 15; 15 INJECTION, SUSPENSION INTRAMUSCULAR at 14:19

## 2019-10-16 RX ADMIN — HYDRALAZINE HYDROCHLORIDE 100 MG: 100 TABLET, FILM COATED ORAL at 06:06

## 2019-10-16 RX ADMIN — IPRATROPIUM BROMIDE AND ALBUTEROL SULFATE 1 AMPULE: .5; 3 SOLUTION RESPIRATORY (INHALATION) at 14:13

## 2019-10-16 RX ADMIN — VERAPAMIL HYDROCHLORIDE 120 MG: 120 TABLET, FILM COATED, EXTENDED RELEASE ORAL at 09:34

## 2019-10-16 RX ADMIN — CARVEDILOL 25 MG: 25 TABLET, FILM COATED ORAL at 09:34

## 2019-10-16 RX ADMIN — HYDRALAZINE HYDROCHLORIDE 100 MG: 100 TABLET, FILM COATED ORAL at 14:18

## 2019-10-16 ASSESSMENT — PAIN SCALES - GENERAL
PAINLEVEL_OUTOF10: 0
PAINLEVEL_OUTOF10: 0

## 2019-10-16 ASSESSMENT — ENCOUNTER SYMPTOMS
GASTROINTESTINAL NEGATIVE: 1
BLOOD IN STOOL: 0
WHEEZING: 0
SHORTNESS OF BREATH: 0
COUGH: 0
STRIDOR: 0
RESPIRATORY NEGATIVE: 1
CHEST TIGHTNESS: 0
EYES NEGATIVE: 1
NAUSEA: 0

## 2019-10-17 ENCOUNTER — CARE COORDINATION (OUTPATIENT)
Dept: CASE MANAGEMENT | Age: 74
End: 2019-10-17

## 2019-10-18 ENCOUNTER — TELEPHONE (OUTPATIENT)
Dept: PHARMACY | Facility: CLINIC | Age: 74
End: 2019-10-18

## 2019-10-18 ENCOUNTER — CARE COORDINATION (OUTPATIENT)
Dept: CASE MANAGEMENT | Age: 74
End: 2019-10-18

## 2019-10-18 LAB
BLOOD CULTURE, ROUTINE: NORMAL
CULTURE, BLOOD 2: NORMAL

## 2019-10-21 ENCOUNTER — HOSPITAL ENCOUNTER (EMERGENCY)
Age: 74
Discharge: HOME | DRG: 683 | End: 2019-10-21
Attending: EMERGENCY MEDICINE
Payer: MEDICARE

## 2019-10-21 ENCOUNTER — TELEPHONE (OUTPATIENT)
Dept: FAMILY MEDICINE CLINIC | Age: 74
End: 2019-10-21

## 2019-10-21 ENCOUNTER — TELEPHONE (OUTPATIENT)
Dept: OTHER | Facility: CLINIC | Age: 74
End: 2019-10-21

## 2019-10-21 VITALS
SYSTOLIC BLOOD PRESSURE: 104 MMHG | OXYGEN SATURATION: 100 % | HEIGHT: 59 IN | HEART RATE: 58 BPM | BODY MASS INDEX: 26.21 KG/M2 | DIASTOLIC BLOOD PRESSURE: 68 MMHG | RESPIRATION RATE: 16 BRPM | TEMPERATURE: 96.5 F | WEIGHT: 130 LBS

## 2019-10-21 DIAGNOSIS — E16.2 HYPOGLYCEMIA: Primary | ICD-10-CM

## 2019-10-21 LAB
ALBUMIN SERPL-MCNC: 3.7 G/DL (ref 3.5–4.6)
ALP BLD-CCNC: 75 U/L (ref 40–130)
ALT SERPL-CCNC: 24 U/L (ref 0–33)
ANION GAP SERPL CALCULATED.3IONS-SCNC: 14 MEQ/L (ref 9–15)
ANISOCYTOSIS: ABNORMAL
AST SERPL-CCNC: 22 U/L (ref 0–35)
BASOPHILS ABSOLUTE: 0 K/UL (ref 0–0.2)
BASOPHILS RELATIVE PERCENT: 0.7 %
BILIRUB SERPL-MCNC: <0.2 MG/DL (ref 0.2–0.7)
BUN BLDV-MCNC: 39 MG/DL (ref 8–23)
CALCIUM SERPL-MCNC: 9 MG/DL (ref 8.5–9.9)
CHLORIDE BLD-SCNC: 93 MEQ/L (ref 95–107)
CO2: 28 MEQ/L (ref 20–31)
CREAT SERPL-MCNC: 2.3 MG/DL (ref 0.5–0.9)
EOSINOPHILS ABSOLUTE: 0 K/UL (ref 0–0.7)
EOSINOPHILS RELATIVE PERCENT: 0.6 %
GFR AFRICAN AMERICAN: 25
GFR NON-AFRICAN AMERICAN: 20.7
GLOBULIN: 2.8 G/DL (ref 2.3–3.5)
GLUCOSE BLD-MCNC: 106 MG/DL (ref 70–99)
GLUCOSE BLD-MCNC: 148 MG/DL (ref 60–115)
GLUCOSE BLD-MCNC: 175 MG/DL (ref 60–115)
HCT VFR BLD CALC: 32.4 % (ref 37–47)
HEMOGLOBIN: 10.3 G/DL (ref 12–16)
HYPOCHROMIA: ABNORMAL
LYMPHOCYTES ABSOLUTE: 1.1 K/UL (ref 1–4.8)
LYMPHOCYTES RELATIVE PERCENT: 15.8 %
MCH RBC QN AUTO: 23 PG (ref 27–31.3)
MCHC RBC AUTO-ENTMCNC: 31.8 % (ref 33–37)
MCV RBC AUTO: 72.1 FL (ref 82–100)
MICROCYTES: ABNORMAL
MONOCYTES ABSOLUTE: 0.4 K/UL (ref 0.2–0.8)
MONOCYTES RELATIVE PERCENT: 6.4 %
NEUTROPHILS ABSOLUTE: 5.3 K/UL (ref 1.4–6.5)
NEUTROPHILS RELATIVE PERCENT: 76.5 %
OVALOCYTES: ABNORMAL
PDW BLD-RTO: 19.3 % (ref 11.5–14.5)
PERFORMED ON: ABNORMAL
PERFORMED ON: ABNORMAL
PLATELET # BLD: 405 K/UL (ref 130–400)
POIKILOCYTES: ABNORMAL
POTASSIUM SERPL-SCNC: 5.2 MEQ/L (ref 3.4–4.9)
RBC # BLD: 4.5 M/UL (ref 4.2–5.4)
SODIUM BLD-SCNC: 135 MEQ/L (ref 135–144)
TOTAL PROTEIN: 6.5 G/DL (ref 6.3–8)
WBC # BLD: 6.9 K/UL (ref 4.8–10.8)

## 2019-10-21 PROCEDURE — 36415 COLL VENOUS BLD VENIPUNCTURE: CPT

## 2019-10-21 PROCEDURE — 99285 EMERGENCY DEPT VISIT HI MDM: CPT

## 2019-10-21 PROCEDURE — 80053 COMPREHEN METABOLIC PANEL: CPT

## 2019-10-21 PROCEDURE — 85025 COMPLETE CBC W/AUTO DIFF WBC: CPT

## 2019-10-21 RX ORDER — 0.9 % SODIUM CHLORIDE 0.9 %
500 INTRAVENOUS SOLUTION INTRAVENOUS ONCE
Status: DISCONTINUED | OUTPATIENT
Start: 2019-10-21 | End: 2019-10-21

## 2019-10-21 ASSESSMENT — ENCOUNTER SYMPTOMS
CHEST TIGHTNESS: 0
RHINORRHEA: 0
PHOTOPHOBIA: 0
COUGH: 0
TROUBLE SWALLOWING: 0
FACIAL SWELLING: 0
CHOKING: 0
BLOOD IN STOOL: 0
BACK PAIN: 0
SORE THROAT: 0
EYE DISCHARGE: 0
ABDOMINAL DISTENTION: 0
WHEEZING: 0
NAUSEA: 0
SHORTNESS OF BREATH: 0
COLOR CHANGE: 0
EYE PAIN: 0
ANAL BLEEDING: 0
CONSTIPATION: 0
STRIDOR: 0
VOICE CHANGE: 0
EYE ITCHING: 0
DIARRHEA: 0
EYE REDNESS: 0
SINUS PRESSURE: 0
VOMITING: 0
ABDOMINAL PAIN: 0

## 2019-10-22 ENCOUNTER — CARE COORDINATION (OUTPATIENT)
Dept: CASE MANAGEMENT | Age: 74
End: 2019-10-22

## 2019-10-23 ENCOUNTER — APPOINTMENT (OUTPATIENT)
Dept: GENERAL RADIOLOGY | Age: 74
DRG: 683 | End: 2019-10-23
Payer: MEDICARE

## 2019-10-23 ENCOUNTER — TELEPHONE (OUTPATIENT)
Dept: OTHER | Facility: CLINIC | Age: 74
End: 2019-10-23

## 2019-10-23 ENCOUNTER — HOSPITAL ENCOUNTER (INPATIENT)
Age: 74
LOS: 1 days | Discharge: HOME HEALTH | DRG: 683 | End: 2019-10-24
Attending: EMERGENCY MEDICINE | Admitting: INTERNAL MEDICINE
Payer: MEDICARE

## 2019-10-23 DIAGNOSIS — E86.0 DEHYDRATION: ICD-10-CM

## 2019-10-23 DIAGNOSIS — N17.9 AKI (ACUTE KIDNEY INJURY) (HCC): ICD-10-CM

## 2019-10-23 DIAGNOSIS — T68.XXXA HYPOTHERMIA, INITIAL ENCOUNTER: ICD-10-CM

## 2019-10-23 DIAGNOSIS — E16.2 HYPOGLYCEMIA: Primary | ICD-10-CM

## 2019-10-23 LAB
ALBUMIN SERPL-MCNC: 3.8 G/DL (ref 3.5–4.6)
ALP BLD-CCNC: 75 U/L (ref 40–130)
ALT SERPL-CCNC: 28 U/L (ref 0–33)
ANION GAP SERPL CALCULATED.3IONS-SCNC: 11 MEQ/L (ref 9–15)
ANISOCYTOSIS: ABNORMAL
AST SERPL-CCNC: 27 U/L (ref 0–35)
BACTERIA: ABNORMAL /HPF
BASOPHILS ABSOLUTE: 0.1 K/UL (ref 0–0.2)
BASOPHILS RELATIVE PERCENT: 1 %
BILIRUB SERPL-MCNC: <0.2 MG/DL (ref 0.2–0.7)
BILIRUBIN URINE: NEGATIVE
BLOOD, URINE: NEGATIVE
BUN BLDV-MCNC: 39 MG/DL (ref 8–23)
CALCIUM SERPL-MCNC: 8.9 MG/DL (ref 8.5–9.9)
CHLORIDE BLD-SCNC: 99 MEQ/L (ref 95–107)
CLARITY: ABNORMAL
CO2: 29 MEQ/L (ref 20–31)
COLOR: YELLOW
CREAT SERPL-MCNC: 2.1 MG/DL (ref 0.5–0.9)
EOSINOPHILS ABSOLUTE: 0.1 K/UL (ref 0–0.7)
EOSINOPHILS RELATIVE PERCENT: 1.1 %
EPITHELIAL CELLS, UA: ABNORMAL /HPF (ref 0–5)
GFR AFRICAN AMERICAN: 27.8
GFR NON-AFRICAN AMERICAN: 22.9
GLOBULIN: 2.6 G/DL (ref 2.3–3.5)
GLUCOSE BLD-MCNC: 136 MG/DL (ref 60–115)
GLUCOSE BLD-MCNC: 241 MG/DL (ref 70–99)
GLUCOSE BLD-MCNC: 343 MG/DL (ref 60–115)
GLUCOSE BLD-MCNC: 69 MG/DL (ref 60–115)
GLUCOSE URINE: NEGATIVE MG/DL
HCT VFR BLD CALC: 28.7 % (ref 37–47)
HEMOGLOBIN: 9.1 G/DL (ref 12–16)
HYALINE CASTS: ABNORMAL /HPF (ref 0–5)
HYPOCHROMIA: ABNORMAL
KETONES, URINE: NEGATIVE MG/DL
LACTIC ACID: 1.3 MMOL/L (ref 0.5–2.2)
LEUKOCYTE ESTERASE, URINE: ABNORMAL
LYMPHOCYTES ABSOLUTE: 0.9 K/UL (ref 1–4.8)
LYMPHOCYTES RELATIVE PERCENT: 13.2 %
MAGNESIUM: 2.6 MG/DL (ref 1.7–2.4)
MCH RBC QN AUTO: 22.7 PG (ref 27–31.3)
MCHC RBC AUTO-ENTMCNC: 31.8 % (ref 33–37)
MCV RBC AUTO: 71.5 FL (ref 82–100)
MICROCYTES: ABNORMAL
MONOCYTES ABSOLUTE: 0.5 K/UL (ref 0.2–0.8)
MONOCYTES RELATIVE PERCENT: 7.1 %
NEUTROPHILS ABSOLUTE: 5.3 K/UL (ref 1.4–6.5)
NEUTROPHILS RELATIVE PERCENT: 77.6 %
NITRITE, URINE: NEGATIVE
OVALOCYTES: ABNORMAL
PDW BLD-RTO: 19 % (ref 11.5–14.5)
PERFORMED ON: ABNORMAL
PERFORMED ON: ABNORMAL
PERFORMED ON: NORMAL
PH UA: 7 (ref 5–9)
PLATELET # BLD: 363 K/UL (ref 130–400)
PLATELET SLIDE REVIEW: NORMAL
POIKILOCYTES: ABNORMAL
POTASSIUM SERPL-SCNC: 4.7 MEQ/L (ref 3.4–4.9)
PROTEIN UA: NEGATIVE MG/DL
RBC # BLD: 4.01 M/UL (ref 4.2–5.4)
RBC UA: ABNORMAL /HPF (ref 0–5)
SLIDE REVIEW: ABNORMAL
SODIUM BLD-SCNC: 139 MEQ/L (ref 135–144)
SPECIFIC GRAVITY UA: 1.01 (ref 1–1.03)
TARGET CELLS: ABNORMAL
TOTAL PROTEIN: 6.4 G/DL (ref 6.3–8)
TROPONIN: 0.02 NG/ML (ref 0–0.01)
URINE REFLEX TO CULTURE: YES
UROBILINOGEN, URINE: 0.2 E.U./DL
VACUOLATED NEUTROPHILS: ABNORMAL
WBC # BLD: 6.9 K/UL (ref 4.8–10.8)
WBC UA: ABNORMAL /HPF (ref 0–5)

## 2019-10-23 PROCEDURE — 2580000003 HC RX 258: Performed by: EMERGENCY MEDICINE

## 2019-10-23 PROCEDURE — 36415 COLL VENOUS BLD VENIPUNCTURE: CPT

## 2019-10-23 PROCEDURE — 80053 COMPREHEN METABOLIC PANEL: CPT

## 2019-10-23 PROCEDURE — 1210000000 HC MED SURG R&B

## 2019-10-23 PROCEDURE — 71045 X-RAY EXAM CHEST 1 VIEW: CPT

## 2019-10-23 PROCEDURE — 85025 COMPLETE CBC W/AUTO DIFF WBC: CPT

## 2019-10-23 PROCEDURE — 87086 URINE CULTURE/COLONY COUNT: CPT

## 2019-10-23 PROCEDURE — 51798 US URINE CAPACITY MEASURE: CPT

## 2019-10-23 PROCEDURE — 83550 IRON BINDING TEST: CPT

## 2019-10-23 PROCEDURE — 96360 HYDRATION IV INFUSION INIT: CPT

## 2019-10-23 PROCEDURE — 83735 ASSAY OF MAGNESIUM: CPT

## 2019-10-23 PROCEDURE — 83605 ASSAY OF LACTIC ACID: CPT

## 2019-10-23 PROCEDURE — 87186 SC STD MICRODIL/AGAR DIL: CPT

## 2019-10-23 PROCEDURE — 96361 HYDRATE IV INFUSION ADD-ON: CPT

## 2019-10-23 PROCEDURE — 2580000003 HC RX 258

## 2019-10-23 PROCEDURE — 84484 ASSAY OF TROPONIN QUANT: CPT

## 2019-10-23 PROCEDURE — 81001 URINALYSIS AUTO W/SCOPE: CPT

## 2019-10-23 PROCEDURE — 83540 ASSAY OF IRON: CPT

## 2019-10-23 PROCEDURE — 87077 CULTURE AEROBIC IDENTIFY: CPT

## 2019-10-23 PROCEDURE — 99284 EMERGENCY DEPT VISIT MOD MDM: CPT

## 2019-10-23 RX ORDER — SODIUM CHLORIDE 9 MG/ML
INJECTION, SOLUTION INTRAVENOUS CONTINUOUS
Status: DISPENSED | OUTPATIENT
Start: 2019-10-23 | End: 2019-10-24

## 2019-10-23 RX ORDER — ACETAMINOPHEN 650 MG/1
650 SUPPOSITORY RECTAL EVERY 4 HOURS PRN
Status: DISCONTINUED | OUTPATIENT
Start: 2019-10-23 | End: 2019-10-24 | Stop reason: HOSPADM

## 2019-10-23 RX ORDER — 0.9 % SODIUM CHLORIDE 0.9 %
500 INTRAVENOUS SOLUTION INTRAVENOUS ONCE
Status: COMPLETED | OUTPATIENT
Start: 2019-10-23 | End: 2019-10-23

## 2019-10-23 RX ORDER — SODIUM CHLORIDE 0.9 % (FLUSH) 0.9 %
10 SYRINGE (ML) INJECTION PRN
Status: DISCONTINUED | OUTPATIENT
Start: 2019-10-23 | End: 2019-10-24 | Stop reason: HOSPADM

## 2019-10-23 RX ORDER — NICOTINE POLACRILEX 4 MG
15 LOZENGE BUCCAL PRN
Status: DISCONTINUED | OUTPATIENT
Start: 2019-10-23 | End: 2019-10-24 | Stop reason: HOSPADM

## 2019-10-23 RX ORDER — SODIUM CHLORIDE 0.9 % (FLUSH) 0.9 %
10 SYRINGE (ML) INJECTION EVERY 12 HOURS SCHEDULED
Status: DISCONTINUED | OUTPATIENT
Start: 2019-10-23 | End: 2019-10-24 | Stop reason: HOSPADM

## 2019-10-23 RX ORDER — ONDANSETRON 2 MG/ML
4 INJECTION INTRAMUSCULAR; INTRAVENOUS EVERY 6 HOURS PRN
Status: DISCONTINUED | OUTPATIENT
Start: 2019-10-23 | End: 2019-10-24 | Stop reason: HOSPADM

## 2019-10-23 RX ORDER — DEXTROSE MONOHYDRATE 25 G/50ML
12.5 INJECTION, SOLUTION INTRAVENOUS PRN
Status: DISCONTINUED | OUTPATIENT
Start: 2019-10-23 | End: 2019-10-24 | Stop reason: HOSPADM

## 2019-10-23 RX ORDER — DEXTROSE MONOHYDRATE 25 G/50ML
INJECTION, SOLUTION INTRAVENOUS
Status: COMPLETED
Start: 2019-10-23 | End: 2019-10-23

## 2019-10-23 RX ORDER — DEXTROSE MONOHYDRATE 25 G/50ML
25 INJECTION, SOLUTION INTRAVENOUS ONCE
Status: COMPLETED | OUTPATIENT
Start: 2019-10-23 | End: 2019-10-23

## 2019-10-23 RX ORDER — DEXTROSE MONOHYDRATE 50 MG/ML
100 INJECTION, SOLUTION INTRAVENOUS PRN
Status: DISCONTINUED | OUTPATIENT
Start: 2019-10-23 | End: 2019-10-24 | Stop reason: HOSPADM

## 2019-10-23 RX ORDER — DEXTROSE MONOHYDRATE 100 MG/ML
INJECTION, SOLUTION INTRAVENOUS CONTINUOUS
Status: DISPENSED | OUTPATIENT
Start: 2019-10-23 | End: 2019-10-24

## 2019-10-23 RX ORDER — HEPARIN SODIUM 5000 [USP'U]/ML
5000 INJECTION, SOLUTION INTRAVENOUS; SUBCUTANEOUS EVERY 8 HOURS SCHEDULED
Status: DISCONTINUED | OUTPATIENT
Start: 2019-10-23 | End: 2019-10-24 | Stop reason: HOSPADM

## 2019-10-23 RX ADMIN — SODIUM CHLORIDE 500 ML: 9 INJECTION, SOLUTION INTRAVENOUS at 19:28

## 2019-10-23 RX ADMIN — DEXTROSE MONOHYDRATE 25 G: 25 INJECTION, SOLUTION INTRAVENOUS at 17:19

## 2019-10-23 RX ADMIN — DEXTROSE 50 % IN WATER (D50W) INTRAVENOUS SYRINGE 25 G: at 17:19

## 2019-10-23 RX ADMIN — DEXTROSE MONOHYDRATE: 100 INJECTION, SOLUTION INTRAVENOUS at 19:21

## 2019-10-23 ASSESSMENT — ENCOUNTER SYMPTOMS
VOMITING: 0
SORE THROAT: 0
ALLERGIC/IMMUNOLOGIC NEGATIVE: 1
CHEST TIGHTNESS: 0
ABDOMINAL PAIN: 0
NAUSEA: 0
GASTROINTESTINAL NEGATIVE: 1
RESPIRATORY NEGATIVE: 1
EYE PAIN: 0
SHORTNESS OF BREATH: 0

## 2019-10-24 VITALS
TEMPERATURE: 98.4 F | WEIGHT: 130 LBS | SYSTOLIC BLOOD PRESSURE: 149 MMHG | RESPIRATION RATE: 18 BRPM | OXYGEN SATURATION: 100 % | HEART RATE: 96 BPM | DIASTOLIC BLOOD PRESSURE: 64 MMHG | BODY MASS INDEX: 26.21 KG/M2 | HEIGHT: 59 IN

## 2019-10-24 LAB
ALBUMIN SERPL-MCNC: 3.4 G/DL (ref 3.5–4.6)
ALP BLD-CCNC: 68 U/L (ref 40–130)
ALT SERPL-CCNC: 22 U/L (ref 0–33)
ANION GAP SERPL CALCULATED.3IONS-SCNC: 14 MEQ/L (ref 9–15)
AST SERPL-CCNC: 21 U/L (ref 0–35)
BASOPHILS ABSOLUTE: 0.1 K/UL (ref 0–0.2)
BASOPHILS RELATIVE PERCENT: 0.8 %
BILIRUB SERPL-MCNC: 0.3 MG/DL (ref 0.2–0.7)
BUN BLDV-MCNC: 35 MG/DL (ref 8–23)
CALCIUM SERPL-MCNC: 8.7 MG/DL (ref 8.5–9.9)
CHLORIDE BLD-SCNC: 100 MEQ/L (ref 95–107)
CO2: 25 MEQ/L (ref 20–31)
CREAT SERPL-MCNC: 1.89 MG/DL (ref 0.5–0.9)
EOSINOPHILS ABSOLUTE: 0.1 K/UL (ref 0–0.7)
EOSINOPHILS RELATIVE PERCENT: 0.9 %
GFR AFRICAN AMERICAN: 31.4
GFR NON-AFRICAN AMERICAN: 25.9
GLOBULIN: 2.5 G/DL (ref 2.3–3.5)
GLUCOSE BLD-MCNC: 107 MG/DL (ref 60–115)
GLUCOSE BLD-MCNC: 217 MG/DL (ref 60–115)
GLUCOSE BLD-MCNC: 259 MG/DL (ref 60–115)
GLUCOSE BLD-MCNC: 71 MG/DL (ref 70–99)
GLUCOSE BLD-MCNC: 89 MG/DL (ref 60–115)
HCT VFR BLD CALC: 27.1 % (ref 37–47)
HEMOGLOBIN: 8.4 G/DL (ref 12–16)
IRON SATURATION: 13 % (ref 11–46)
IRON: 42 UG/DL (ref 37–145)
LYMPHOCYTES ABSOLUTE: 1.6 K/UL (ref 1–4.8)
LYMPHOCYTES RELATIVE PERCENT: 17.8 %
MCH RBC QN AUTO: 22.3 PG (ref 27–31.3)
MCHC RBC AUTO-ENTMCNC: 31.1 % (ref 33–37)
MCV RBC AUTO: 71.6 FL (ref 82–100)
MONOCYTES ABSOLUTE: 0.8 K/UL (ref 0.2–0.8)
MONOCYTES RELATIVE PERCENT: 9.2 %
NEUTROPHILS ABSOLUTE: 6.3 K/UL (ref 1.4–6.5)
NEUTROPHILS RELATIVE PERCENT: 71.3 %
PDW BLD-RTO: 19.2 % (ref 11.5–14.5)
PERFORMED ON: ABNORMAL
PERFORMED ON: ABNORMAL
PERFORMED ON: NORMAL
PERFORMED ON: NORMAL
PLATELET # BLD: 342 K/UL (ref 130–400)
POTASSIUM REFLEX MAGNESIUM: 4.1 MEQ/L (ref 3.4–4.9)
RBC # BLD: 3.78 M/UL (ref 4.2–5.4)
SODIUM BLD-SCNC: 139 MEQ/L (ref 135–144)
TOTAL IRON BINDING CAPACITY: 322 UG/DL (ref 178–450)
TOTAL PROTEIN: 5.9 G/DL (ref 6.3–8)
TROPONIN: 0.02 NG/ML (ref 0–0.01)
TROPONIN: 0.02 NG/ML (ref 0–0.01)
WBC # BLD: 8.8 K/UL (ref 4.8–10.8)

## 2019-10-24 PROCEDURE — 96372 THER/PROPH/DIAG INJ SC/IM: CPT

## 2019-10-24 PROCEDURE — 99222 1ST HOSP IP/OBS MODERATE 55: CPT | Performed by: INTERNAL MEDICINE

## 2019-10-24 PROCEDURE — 80053 COMPREHEN METABOLIC PANEL: CPT

## 2019-10-24 PROCEDURE — 85025 COMPLETE CBC W/AUTO DIFF WBC: CPT

## 2019-10-24 PROCEDURE — 2580000003 HC RX 258: Performed by: NURSE PRACTITIONER

## 2019-10-24 PROCEDURE — 36415 COLL VENOUS BLD VENIPUNCTURE: CPT

## 2019-10-24 PROCEDURE — 6360000002 HC RX W HCPCS: Performed by: NURSE PRACTITIONER

## 2019-10-24 PROCEDURE — 84484 ASSAY OF TROPONIN QUANT: CPT

## 2019-10-24 RX ADMIN — SODIUM CHLORIDE: 9 INJECTION, SOLUTION INTRAVENOUS at 00:22

## 2019-10-24 RX ADMIN — HEPARIN SODIUM 5000 UNITS: 5000 INJECTION INTRAVENOUS; SUBCUTANEOUS at 00:22

## 2019-10-24 RX ADMIN — HEPARIN SODIUM 5000 UNITS: 5000 INJECTION INTRAVENOUS; SUBCUTANEOUS at 10:11

## 2019-10-25 ENCOUNTER — CARE COORDINATION (OUTPATIENT)
Dept: CASE MANAGEMENT | Age: 74
End: 2019-10-25

## 2019-10-26 LAB
ORGANISM: ABNORMAL
URINE CULTURE, ROUTINE: ABNORMAL

## 2019-10-27 ENCOUNTER — CARE COORDINATION (OUTPATIENT)
Dept: CASE MANAGEMENT | Age: 74
End: 2019-10-27

## 2019-10-28 ENCOUNTER — APPOINTMENT (OUTPATIENT)
Dept: GENERAL RADIOLOGY | Age: 74
End: 2019-10-28
Payer: MEDICARE

## 2019-10-28 ENCOUNTER — TELEPHONE (OUTPATIENT)
Dept: OTHER | Facility: CLINIC | Age: 74
End: 2019-10-28

## 2019-10-28 ENCOUNTER — HOSPITAL ENCOUNTER (EMERGENCY)
Age: 74
Discharge: HOME | End: 2019-10-28
Payer: MEDICARE

## 2019-10-28 ENCOUNTER — TELEPHONE (OUTPATIENT)
Dept: FAMILY MEDICINE CLINIC | Age: 74
End: 2019-10-28

## 2019-10-28 ENCOUNTER — CARE COORDINATION (OUTPATIENT)
Dept: CASE MANAGEMENT | Age: 74
End: 2019-10-28

## 2019-10-28 VITALS
HEART RATE: 77 BPM | HEIGHT: 59 IN | DIASTOLIC BLOOD PRESSURE: 60 MMHG | OXYGEN SATURATION: 98 % | BODY MASS INDEX: 27.01 KG/M2 | WEIGHT: 134 LBS | RESPIRATION RATE: 18 BRPM | SYSTOLIC BLOOD PRESSURE: 125 MMHG | TEMPERATURE: 97.7 F

## 2019-10-28 DIAGNOSIS — R53.83 FATIGUE, UNSPECIFIED TYPE: Primary | ICD-10-CM

## 2019-10-28 LAB
ALBUMIN SERPL-MCNC: 3.7 G/DL (ref 3.5–4.6)
ALP BLD-CCNC: 80 U/L (ref 40–130)
ALT SERPL-CCNC: 24 U/L (ref 0–33)
ANION GAP SERPL CALCULATED.3IONS-SCNC: 13 MEQ/L (ref 9–15)
AST SERPL-CCNC: 39 U/L (ref 0–35)
BASOPHILS ABSOLUTE: 0 K/UL (ref 0–0.2)
BASOPHILS RELATIVE PERCENT: 1.4 %
BILIRUB SERPL-MCNC: <0.2 MG/DL (ref 0.2–0.7)
BILIRUBIN URINE: NEGATIVE
BLOOD, URINE: NEGATIVE
BUN BLDV-MCNC: 21 MG/DL (ref 8–23)
CALCIUM SERPL-MCNC: 8.8 MG/DL (ref 8.5–9.9)
CHLORIDE BLD-SCNC: 100 MEQ/L (ref 95–107)
CHP ED QC CHECK: NORMAL
CHP ED QC CHECK: NORMAL
CLARITY: CLEAR
CO2: 22 MEQ/L (ref 20–31)
COLOR: YELLOW
CREAT SERPL-MCNC: 1.55 MG/DL (ref 0.5–0.9)
EKG ATRIAL RATE: 77 BPM
EKG P AXIS: 65 DEGREES
EKG P-R INTERVAL: 146 MS
EKG Q-T INTERVAL: 402 MS
EKG QRS DURATION: 84 MS
EKG QTC CALCULATION (BAZETT): 454 MS
EKG R AXIS: 43 DEGREES
EKG T AXIS: 135 DEGREES
EKG VENTRICULAR RATE: 77 BPM
EOSINOPHILS ABSOLUTE: 0.1 K/UL (ref 0–0.7)
EOSINOPHILS RELATIVE PERCENT: 1 %
GFR AFRICAN AMERICAN: 39.4
GFR NON-AFRICAN AMERICAN: 32.6
GLOBULIN: 2.7 G/DL (ref 2.3–3.5)
GLUCOSE BLD-MCNC: 172 MG/DL (ref 70–99)
GLUCOSE BLD-MCNC: 213 MG/DL
GLUCOSE BLD-MCNC: 213 MG/DL
GLUCOSE BLD-MCNC: 213 MG/DL (ref 60–115)
GLUCOSE URINE: NEGATIVE MG/DL
HCT VFR BLD CALC: 25.7 % (ref 37–47)
HEMOGLOBIN: 8.1 G/DL (ref 12–16)
KETONES, URINE: NEGATIVE MG/DL
LEUKOCYTE ESTERASE, URINE: NEGATIVE
LYMPHOCYTES ABSOLUTE: 2 K/UL (ref 1–4.8)
LYMPHOCYTES RELATIVE PERCENT: 27 %
MCH RBC QN AUTO: 23 PG (ref 27–31.3)
MCHC RBC AUTO-ENTMCNC: 31.6 % (ref 33–37)
MCV RBC AUTO: 72.7 FL (ref 82–100)
MONOCYTES ABSOLUTE: 0.4 K/UL (ref 0.2–0.8)
MONOCYTES RELATIVE PERCENT: 4.9 %
NEUTROPHILS ABSOLUTE: 4.9 K/UL (ref 1.4–6.5)
NEUTROPHILS RELATIVE PERCENT: 67 %
NITRITE, URINE: NEGATIVE
PDW BLD-RTO: 20.1 % (ref 11.5–14.5)
PERFORMED ON: ABNORMAL
PH UA: 6.5 (ref 5–9)
PLATELET # BLD: 327 K/UL (ref 130–400)
POIKILOCYTES: ABNORMAL
POTASSIUM SERPL-SCNC: 5.6 MEQ/L (ref 3.4–4.9)
PROTEIN UA: NEGATIVE MG/DL
RBC # BLD: 3.54 M/UL (ref 4.2–5.4)
SODIUM BLD-SCNC: 135 MEQ/L (ref 135–144)
SPECIFIC GRAVITY UA: 1.01 (ref 1–1.03)
TOTAL CK: 128 U/L (ref 0–170)
TOTAL PROTEIN: 6.4 G/DL (ref 6.3–8)
TROPONIN: 0.02 NG/ML (ref 0–0.01)
URINE REFLEX TO CULTURE: NORMAL
UROBILINOGEN, URINE: 0.2 E.U./DL
WBC # BLD: 7.3 K/UL (ref 4.8–10.8)

## 2019-10-28 PROCEDURE — 93010 ELECTROCARDIOGRAM REPORT: CPT | Performed by: INTERNAL MEDICINE

## 2019-10-28 PROCEDURE — 99284 EMERGENCY DEPT VISIT MOD MDM: CPT

## 2019-10-28 PROCEDURE — 84484 ASSAY OF TROPONIN QUANT: CPT

## 2019-10-28 PROCEDURE — 85025 COMPLETE CBC W/AUTO DIFF WBC: CPT

## 2019-10-28 PROCEDURE — 71045 X-RAY EXAM CHEST 1 VIEW: CPT

## 2019-10-28 PROCEDURE — 93005 ELECTROCARDIOGRAM TRACING: CPT | Performed by: EMERGENCY MEDICINE

## 2019-10-28 PROCEDURE — 82550 ASSAY OF CK (CPK): CPT

## 2019-10-28 PROCEDURE — 36415 COLL VENOUS BLD VENIPUNCTURE: CPT

## 2019-10-28 PROCEDURE — 81003 URINALYSIS AUTO W/O SCOPE: CPT

## 2019-10-28 PROCEDURE — 80053 COMPREHEN METABOLIC PANEL: CPT

## 2019-10-28 ASSESSMENT — ENCOUNTER SYMPTOMS
ABDOMINAL DISTENTION: 0
RHINORRHEA: 0
ABDOMINAL PAIN: 0
SORE THROAT: 0
SHORTNESS OF BREATH: 0
COLOR CHANGE: 0
CONSTIPATION: 0
EYE DISCHARGE: 0

## 2019-10-29 ENCOUNTER — CARE COORDINATION (OUTPATIENT)
Dept: CARE COORDINATION | Age: 74
End: 2019-10-29

## 2019-10-29 DIAGNOSIS — E11.65 UNCONTROLLED TYPE 2 DIABETES MELLITUS WITH HYPERGLYCEMIA (HCC): Primary | ICD-10-CM

## 2019-10-30 ENCOUNTER — OFFICE VISIT (OUTPATIENT)
Dept: FAMILY MEDICINE CLINIC | Age: 74
End: 2019-10-30
Payer: MEDICARE

## 2019-10-30 ENCOUNTER — CARE COORDINATION (OUTPATIENT)
Dept: CARE COORDINATION | Age: 74
End: 2019-10-30

## 2019-10-30 VITALS
TEMPERATURE: 98.8 F | OXYGEN SATURATION: 96 % | SYSTOLIC BLOOD PRESSURE: 142 MMHG | RESPIRATION RATE: 14 BRPM | WEIGHT: 134 LBS | HEART RATE: 64 BPM | HEIGHT: 59 IN | DIASTOLIC BLOOD PRESSURE: 80 MMHG | BODY MASS INDEX: 27.01 KG/M2

## 2019-10-30 DIAGNOSIS — D64.9 ANEMIA, UNSPECIFIED TYPE: ICD-10-CM

## 2019-10-30 DIAGNOSIS — I10 ESSENTIAL HYPERTENSION: ICD-10-CM

## 2019-10-30 DIAGNOSIS — Z09 HOSPITAL DISCHARGE FOLLOW-UP: Primary | ICD-10-CM

## 2019-10-30 DIAGNOSIS — F33.42 MAJOR DEPRESSIVE DISORDER, RECURRENT, IN FULL REMISSION (HCC): ICD-10-CM

## 2019-10-30 DIAGNOSIS — F41.9 ANXIETY: ICD-10-CM

## 2019-10-30 DIAGNOSIS — I50.41 ACUTE COMBINED SYSTOLIC AND DIASTOLIC CHF, NYHA CLASS 1 (HCC): ICD-10-CM

## 2019-10-30 DIAGNOSIS — E11.649 UNCONTROLLED TYPE 2 DIABETES MELLITUS WITH HYPOGLYCEMIA WITHOUT COMA (HCC): ICD-10-CM

## 2019-10-30 DIAGNOSIS — E78.49 OTHER HYPERLIPIDEMIA: ICD-10-CM

## 2019-10-30 LAB
ANION GAP SERPL CALCULATED.3IONS-SCNC: 14 MEQ/L (ref 9–15)
ANISOCYTOSIS: ABNORMAL
BASOPHILS ABSOLUTE: 0.1 K/UL (ref 0–0.2)
BASOPHILS RELATIVE PERCENT: 1 %
BUN BLDV-MCNC: 20 MG/DL (ref 8–23)
CALCIUM SERPL-MCNC: 9.2 MG/DL (ref 8.5–9.9)
CHLORIDE BLD-SCNC: 102 MEQ/L (ref 95–107)
CO2: 23 MEQ/L (ref 20–31)
CREAT SERPL-MCNC: 1.19 MG/DL (ref 0.5–0.9)
EOSINOPHILS ABSOLUTE: 0.2 K/UL (ref 0–0.7)
EOSINOPHILS RELATIVE PERCENT: 3 %
GFR AFRICAN AMERICAN: 53.5
GFR NON-AFRICAN AMERICAN: 44.2
GLUCOSE BLD-MCNC: 181 MG/DL (ref 70–99)
HCT VFR BLD CALC: 27.4 % (ref 37–47)
HEMOGLOBIN: 8.2 G/DL (ref 12–16)
LYMPHOCYTES ABSOLUTE: 1.4 K/UL (ref 1–4.8)
LYMPHOCYTES RELATIVE PERCENT: 20.9 %
MCH RBC QN AUTO: 23 PG (ref 27–31.3)
MCHC RBC AUTO-ENTMCNC: 30.1 % (ref 33–37)
MCV RBC AUTO: 76.5 FL (ref 82–100)
MONOCYTES ABSOLUTE: 0.5 K/UL (ref 0.2–0.8)
MONOCYTES RELATIVE PERCENT: 8.2 %
NEUTROPHILS ABSOLUTE: 4.4 K/UL (ref 1.4–6.5)
NEUTROPHILS RELATIVE PERCENT: 66.9 %
OVALOCYTES: ABNORMAL
PDW BLD-RTO: 22.2 % (ref 11.5–14.5)
PLATELET # BLD: 376 K/UL (ref 130–400)
POIKILOCYTES: ABNORMAL
POTASSIUM SERPL-SCNC: 5 MEQ/L (ref 3.4–4.9)
RBC # BLD: 3.59 M/UL (ref 4.2–5.4)
SLIDE REVIEW: ABNORMAL
SODIUM BLD-SCNC: 139 MEQ/L (ref 135–144)
WBC # BLD: 6.5 K/UL (ref 4.8–10.8)

## 2019-10-30 PROCEDURE — 99495 TRANSJ CARE MGMT MOD F2F 14D: CPT | Performed by: FAMILY MEDICINE

## 2019-10-30 PROCEDURE — 1111F DSCHRG MED/CURRENT MED MERGE: CPT | Performed by: FAMILY MEDICINE

## 2019-10-30 RX ORDER — PAROXETINE HYDROCHLORIDE 40 MG/1
TABLET, FILM COATED ORAL
Qty: 30 TABLET | Refills: 1 | Status: SHIPPED | OUTPATIENT
Start: 2019-10-30 | End: 2020-02-05 | Stop reason: SDUPTHER

## 2019-11-04 ENCOUNTER — CARE COORDINATION (OUTPATIENT)
Dept: CASE MANAGEMENT | Age: 74
End: 2019-11-04

## 2019-11-07 ENCOUNTER — OFFICE VISIT (OUTPATIENT)
Dept: PALLATIVE CARE | Age: 74
End: 2019-11-07
Payer: MEDICARE

## 2019-11-07 ENCOUNTER — CARE COORDINATION (OUTPATIENT)
Dept: CASE MANAGEMENT | Age: 74
End: 2019-11-07

## 2019-11-07 ENCOUNTER — OFFICE VISIT (OUTPATIENT)
Dept: ENDOCRINOLOGY | Age: 74
End: 2019-11-07
Payer: MEDICARE

## 2019-11-07 VITALS
HEART RATE: 87 BPM | BODY MASS INDEX: 27.21 KG/M2 | SYSTOLIC BLOOD PRESSURE: 91 MMHG | RESPIRATION RATE: 18 BRPM | DIASTOLIC BLOOD PRESSURE: 53 MMHG | WEIGHT: 135 LBS | HEIGHT: 59 IN | OXYGEN SATURATION: 100 %

## 2019-11-07 VITALS
HEART RATE: 99 BPM | RESPIRATION RATE: 20 BRPM | SYSTOLIC BLOOD PRESSURE: 130 MMHG | DIASTOLIC BLOOD PRESSURE: 77 MMHG | OXYGEN SATURATION: 99 %

## 2019-11-07 DIAGNOSIS — Z51.5 ENCOUNTER FOR PALLIATIVE CARE: ICD-10-CM

## 2019-11-07 DIAGNOSIS — M51.36 DDD (DEGENERATIVE DISC DISEASE), LUMBAR: ICD-10-CM

## 2019-11-07 DIAGNOSIS — R06.02 SOB (SHORTNESS OF BREATH): ICD-10-CM

## 2019-11-07 DIAGNOSIS — M47.896 OTHER OSTEOARTHRITIS OF SPINE, LUMBAR REGION: Primary | ICD-10-CM

## 2019-11-07 DIAGNOSIS — J44.9 CHRONIC OBSTRUCTIVE PULMONARY DISEASE, UNSPECIFIED COPD TYPE (HCC): ICD-10-CM

## 2019-11-07 DIAGNOSIS — M79.7 FIBROMYALGIA: ICD-10-CM

## 2019-11-07 DIAGNOSIS — I50.9 CONGESTIVE HEART FAILURE, UNSPECIFIED HF CHRONICITY, UNSPECIFIED HEART FAILURE TYPE (HCC): ICD-10-CM

## 2019-11-07 DIAGNOSIS — R60.9 EDEMA, UNSPECIFIED TYPE: ICD-10-CM

## 2019-11-07 DIAGNOSIS — M54.17 LUMBOSACRAL RADICULOPATHY AT S1: ICD-10-CM

## 2019-11-07 LAB
CHP ED QC CHECK: NORMAL
GLUCOSE BLD-MCNC: 212 MG/DL

## 2019-11-07 PROCEDURE — G8427 DOCREV CUR MEDS BY ELIG CLIN: HCPCS | Performed by: INTERNAL MEDICINE

## 2019-11-07 PROCEDURE — 4040F PNEUMOC VAC/ADMIN/RCVD: CPT | Performed by: INTERNAL MEDICINE

## 2019-11-07 PROCEDURE — 1036F TOBACCO NON-USER: CPT | Performed by: INTERNAL MEDICINE

## 2019-11-07 PROCEDURE — 4040F PNEUMOC VAC/ADMIN/RCVD: CPT | Performed by: FAMILY MEDICINE

## 2019-11-07 PROCEDURE — 3017F COLORECTAL CA SCREEN DOC REV: CPT | Performed by: FAMILY MEDICINE

## 2019-11-07 PROCEDURE — 1090F PRES/ABSN URINE INCON ASSESS: CPT | Performed by: FAMILY MEDICINE

## 2019-11-07 PROCEDURE — 99344 HOME/RES VST NEW MOD MDM 60: CPT | Performed by: FAMILY MEDICINE

## 2019-11-07 PROCEDURE — G8400 PT W/DXA NO RESULTS DOC: HCPCS | Performed by: INTERNAL MEDICINE

## 2019-11-07 PROCEDURE — G8417 CALC BMI ABV UP PARAM F/U: HCPCS | Performed by: FAMILY MEDICINE

## 2019-11-07 PROCEDURE — G8482 FLU IMMUNIZE ORDER/ADMIN: HCPCS | Performed by: FAMILY MEDICINE

## 2019-11-07 PROCEDURE — 2022F DILAT RTA XM EVC RTNOPTHY: CPT | Performed by: INTERNAL MEDICINE

## 2019-11-07 PROCEDURE — 99213 OFFICE O/P EST LOW 20 MIN: CPT | Performed by: INTERNAL MEDICINE

## 2019-11-07 PROCEDURE — 1036F TOBACCO NON-USER: CPT | Performed by: FAMILY MEDICINE

## 2019-11-07 PROCEDURE — G8598 ASA/ANTIPLAT THER USED: HCPCS | Performed by: FAMILY MEDICINE

## 2019-11-07 PROCEDURE — 1111F DSCHRG MED/CURRENT MED MERGE: CPT | Performed by: INTERNAL MEDICINE

## 2019-11-07 PROCEDURE — 1090F PRES/ABSN URINE INCON ASSESS: CPT | Performed by: INTERNAL MEDICINE

## 2019-11-07 PROCEDURE — G8598 ASA/ANTIPLAT THER USED: HCPCS | Performed by: INTERNAL MEDICINE

## 2019-11-07 PROCEDURE — 1123F ACP DISCUSS/DSCN MKR DOCD: CPT | Performed by: FAMILY MEDICINE

## 2019-11-07 PROCEDURE — G8482 FLU IMMUNIZE ORDER/ADMIN: HCPCS | Performed by: INTERNAL MEDICINE

## 2019-11-07 PROCEDURE — 3017F COLORECTAL CA SCREEN DOC REV: CPT | Performed by: INTERNAL MEDICINE

## 2019-11-07 PROCEDURE — 1123F ACP DISCUSS/DSCN MKR DOCD: CPT | Performed by: INTERNAL MEDICINE

## 2019-11-07 PROCEDURE — 82962 GLUCOSE BLOOD TEST: CPT | Performed by: INTERNAL MEDICINE

## 2019-11-07 PROCEDURE — G8417 CALC BMI ABV UP PARAM F/U: HCPCS | Performed by: INTERNAL MEDICINE

## 2019-11-07 PROCEDURE — 3052F HG A1C>EQUAL 8.0%<EQUAL 9.0%: CPT | Performed by: INTERNAL MEDICINE

## 2019-11-07 PROCEDURE — 99497 ADVNCD CARE PLAN 30 MIN: CPT | Performed by: FAMILY MEDICINE

## 2019-11-07 RX ORDER — HYDROCODONE BITARTRATE AND ACETAMINOPHEN 5; 325 MG/1; MG/1
1 TABLET ORAL EVERY 6 HOURS PRN
Qty: 120 TABLET | Refills: 0 | Status: SHIPPED | OUTPATIENT
Start: 2019-11-07 | End: 2019-12-07

## 2019-11-07 ASSESSMENT — ENCOUNTER SYMPTOMS
VOICE CHANGE: 0
CONSTIPATION: 0
ALLERGIC/IMMUNOLOGIC NEGATIVE: 1
WHEEZING: 0
NAUSEA: 0
COLOR CHANGE: 0
SHORTNESS OF BREATH: 1
SORE THROAT: 0
BACK PAIN: 1
GASTROINTESTINAL NEGATIVE: 1
COUGH: 0
EYES NEGATIVE: 1

## 2019-11-08 ENCOUNTER — CARE COORDINATION (OUTPATIENT)
Dept: CASE MANAGEMENT | Age: 74
End: 2019-11-08

## 2019-11-11 ENCOUNTER — OFFICE VISIT (OUTPATIENT)
Dept: CARDIOLOGY CLINIC | Age: 74
End: 2019-11-11
Payer: MEDICARE

## 2019-11-11 VITALS
RESPIRATION RATE: 18 BRPM | BODY MASS INDEX: 27.43 KG/M2 | SYSTOLIC BLOOD PRESSURE: 128 MMHG | DIASTOLIC BLOOD PRESSURE: 60 MMHG | WEIGHT: 135.8 LBS | HEART RATE: 93 BPM | OXYGEN SATURATION: 97 %

## 2019-11-11 DIAGNOSIS — E78.2 MIXED HYPERLIPIDEMIA: ICD-10-CM

## 2019-11-11 DIAGNOSIS — I42.1 HOCM (HYPERTROPHIC OBSTRUCTIVE CARDIOMYOPATHY) (HCC): ICD-10-CM

## 2019-11-11 DIAGNOSIS — I50.41 ACUTE COMBINED SYSTOLIC AND DIASTOLIC CHF, NYHA CLASS 1 (HCC): ICD-10-CM

## 2019-11-11 DIAGNOSIS — I50.41 ACUTE COMBINED SYSTOLIC AND DIASTOLIC CHF, NYHA CLASS 1 (HCC): Primary | ICD-10-CM

## 2019-11-11 DIAGNOSIS — I10 ESSENTIAL HYPERTENSION: ICD-10-CM

## 2019-11-11 LAB
ANION GAP SERPL CALCULATED.3IONS-SCNC: 15 MEQ/L (ref 9–15)
BUN BLDV-MCNC: 35 MG/DL (ref 8–23)
CALCIUM SERPL-MCNC: 9.8 MG/DL (ref 8.5–9.9)
CHLORIDE BLD-SCNC: 105 MEQ/L (ref 95–107)
CO2: 24 MEQ/L (ref 20–31)
CREAT SERPL-MCNC: 1.46 MG/DL (ref 0.5–0.9)
GFR AFRICAN AMERICAN: 42.2
GFR NON-AFRICAN AMERICAN: 34.9
GLUCOSE BLD-MCNC: 127 MG/DL (ref 70–99)
POTASSIUM SERPL-SCNC: 5.2 MEQ/L (ref 3.4–4.9)
SODIUM BLD-SCNC: 144 MEQ/L (ref 135–144)

## 2019-11-11 PROCEDURE — G8400 PT W/DXA NO RESULTS DOC: HCPCS | Performed by: INTERNAL MEDICINE

## 2019-11-11 PROCEDURE — G8482 FLU IMMUNIZE ORDER/ADMIN: HCPCS | Performed by: INTERNAL MEDICINE

## 2019-11-11 PROCEDURE — 99214 OFFICE O/P EST MOD 30 MIN: CPT | Performed by: INTERNAL MEDICINE

## 2019-11-11 PROCEDURE — 1036F TOBACCO NON-USER: CPT | Performed by: INTERNAL MEDICINE

## 2019-11-11 PROCEDURE — 1090F PRES/ABSN URINE INCON ASSESS: CPT | Performed by: INTERNAL MEDICINE

## 2019-11-11 PROCEDURE — 1123F ACP DISCUSS/DSCN MKR DOCD: CPT | Performed by: INTERNAL MEDICINE

## 2019-11-11 PROCEDURE — G8417 CALC BMI ABV UP PARAM F/U: HCPCS | Performed by: INTERNAL MEDICINE

## 2019-11-11 PROCEDURE — 4040F PNEUMOC VAC/ADMIN/RCVD: CPT | Performed by: INTERNAL MEDICINE

## 2019-11-11 PROCEDURE — G8598 ASA/ANTIPLAT THER USED: HCPCS | Performed by: INTERNAL MEDICINE

## 2019-11-11 PROCEDURE — G8427 DOCREV CUR MEDS BY ELIG CLIN: HCPCS | Performed by: INTERNAL MEDICINE

## 2019-11-11 PROCEDURE — 3017F COLORECTAL CA SCREEN DOC REV: CPT | Performed by: INTERNAL MEDICINE

## 2019-11-11 PROCEDURE — 1111F DSCHRG MED/CURRENT MED MERGE: CPT | Performed by: INTERNAL MEDICINE

## 2019-11-11 RX ORDER — DIGOXIN 125 MCG
125 TABLET ORAL DAILY
Qty: 30 TABLET | Refills: 3 | Status: SHIPPED | OUTPATIENT
Start: 2019-11-11 | End: 2020-05-11 | Stop reason: SDUPTHER

## 2019-11-11 ASSESSMENT — ENCOUNTER SYMPTOMS
SHORTNESS OF BREATH: 0
BLOOD IN STOOL: 0
WHEEZING: 0
STRIDOR: 0
EYES NEGATIVE: 1
GASTROINTESTINAL NEGATIVE: 1
COUGH: 0
RESPIRATORY NEGATIVE: 1
NAUSEA: 0
CHEST TIGHTNESS: 0

## 2019-11-13 ENCOUNTER — CARE COORDINATION (OUTPATIENT)
Dept: CARE COORDINATION | Age: 74
End: 2019-11-13

## 2019-11-19 ENCOUNTER — TELEPHONE (OUTPATIENT)
Dept: ENDOCRINOLOGY | Age: 74
End: 2019-11-19

## 2019-11-19 NOTE — TELEPHONE ENCOUNTER
Patient states that she will be out of medication as of tomorrow.   She is following up on previous request.

## 2019-11-20 ENCOUNTER — TELEPHONE (OUTPATIENT)
Dept: ENDOCRINOLOGY | Age: 74
End: 2019-11-20

## 2019-11-20 RX ORDER — CALCIUM CARB/VITAMIN D3/VIT K1 500-100-40
1 TABLET,CHEWABLE ORAL DAILY
Qty: 50 EACH | Refills: 3 | Status: ON HOLD | OUTPATIENT
Start: 2019-11-20 | End: 2020-07-03 | Stop reason: HOSPADM

## 2019-11-26 ENCOUNTER — TELEPHONE (OUTPATIENT)
Dept: FAMILY MEDICINE CLINIC | Age: 74
End: 2019-11-26

## 2019-11-27 ENCOUNTER — TELEPHONE (OUTPATIENT)
Dept: PALLATIVE CARE | Age: 74
End: 2019-11-27

## 2019-12-03 ENCOUNTER — TELEPHONE (OUTPATIENT)
Dept: ENDOCRINOLOGY | Age: 74
End: 2019-12-03

## 2019-12-06 ENCOUNTER — CLINICAL DOCUMENTATION (OUTPATIENT)
Dept: PALLATIVE CARE | Age: 74
End: 2019-12-06

## 2019-12-09 ENCOUNTER — OFFICE VISIT (OUTPATIENT)
Dept: GASTROENTEROLOGY | Age: 74
End: 2019-12-09
Payer: MEDICARE

## 2019-12-09 VITALS — OXYGEN SATURATION: 95 % | SYSTOLIC BLOOD PRESSURE: 110 MMHG | DIASTOLIC BLOOD PRESSURE: 62 MMHG | HEART RATE: 89 BPM

## 2019-12-09 DIAGNOSIS — K21.9 GASTROESOPHAGEAL REFLUX DISEASE WITHOUT ESOPHAGITIS: ICD-10-CM

## 2019-12-09 DIAGNOSIS — R19.7 DIARRHEA, UNSPECIFIED TYPE: Primary | ICD-10-CM

## 2019-12-09 DIAGNOSIS — R11.0 NAUSEA: ICD-10-CM

## 2019-12-09 DIAGNOSIS — D64.9 ANEMIA, UNSPECIFIED TYPE: ICD-10-CM

## 2019-12-09 DIAGNOSIS — R10.32 LLQ PAIN: ICD-10-CM

## 2019-12-09 PROCEDURE — 4040F PNEUMOC VAC/ADMIN/RCVD: CPT | Performed by: NURSE PRACTITIONER

## 2019-12-09 PROCEDURE — G8427 DOCREV CUR MEDS BY ELIG CLIN: HCPCS | Performed by: NURSE PRACTITIONER

## 2019-12-09 PROCEDURE — G8400 PT W/DXA NO RESULTS DOC: HCPCS | Performed by: NURSE PRACTITIONER

## 2019-12-09 PROCEDURE — 3017F COLORECTAL CA SCREEN DOC REV: CPT | Performed by: NURSE PRACTITIONER

## 2019-12-09 PROCEDURE — G8482 FLU IMMUNIZE ORDER/ADMIN: HCPCS | Performed by: NURSE PRACTITIONER

## 2019-12-09 PROCEDURE — 99214 OFFICE O/P EST MOD 30 MIN: CPT | Performed by: NURSE PRACTITIONER

## 2019-12-09 PROCEDURE — G8417 CALC BMI ABV UP PARAM F/U: HCPCS | Performed by: NURSE PRACTITIONER

## 2019-12-09 PROCEDURE — 1036F TOBACCO NON-USER: CPT | Performed by: NURSE PRACTITIONER

## 2019-12-09 PROCEDURE — G8598 ASA/ANTIPLAT THER USED: HCPCS | Performed by: NURSE PRACTITIONER

## 2019-12-09 PROCEDURE — 1123F ACP DISCUSS/DSCN MKR DOCD: CPT | Performed by: NURSE PRACTITIONER

## 2019-12-09 PROCEDURE — 1090F PRES/ABSN URINE INCON ASSESS: CPT | Performed by: NURSE PRACTITIONER

## 2019-12-09 RX ORDER — OMEPRAZOLE 20 MG/1
20 CAPSULE, DELAYED RELEASE ORAL
Qty: 90 CAPSULE | Refills: 1 | Status: ON HOLD
Start: 2019-12-09 | End: 2020-02-26 | Stop reason: HOSPADM

## 2019-12-09 RX ORDER — BLOOD SUGAR DIAGNOSTIC
STRIP MISCELLANEOUS
Qty: 100 EACH | Refills: 3 | Status: SHIPPED | OUTPATIENT
Start: 2019-12-09 | End: 2020-05-23 | Stop reason: SDUPTHER

## 2019-12-09 RX ORDER — DICYCLOMINE HYDROCHLORIDE 10 MG/1
10 CAPSULE ORAL 4 TIMES DAILY PRN
Qty: 120 CAPSULE | Refills: 3 | Status: SHIPPED | OUTPATIENT
Start: 2019-12-09 | End: 2021-05-25 | Stop reason: ALTCHOICE

## 2019-12-09 RX ORDER — ONDANSETRON 4 MG/1
4 TABLET, FILM COATED ORAL EVERY 8 HOURS PRN
Qty: 30 TABLET | Refills: 2 | Status: SHIPPED | OUTPATIENT
Start: 2019-12-09 | End: 2021-05-25 | Stop reason: ALTCHOICE

## 2019-12-12 ENCOUNTER — CARE COORDINATION (OUTPATIENT)
Dept: CARE COORDINATION | Age: 74
End: 2019-12-12

## 2019-12-13 ENCOUNTER — CARE COORDINATION (OUTPATIENT)
Dept: CARE COORDINATION | Age: 74
End: 2019-12-13

## 2019-12-13 DIAGNOSIS — F41.9 ANXIETY: Primary | ICD-10-CM

## 2019-12-13 DIAGNOSIS — M47.26 OSTEOARTHRITIS OF SPINE WITH RADICULOPATHY, LUMBAR REGION: ICD-10-CM

## 2019-12-13 DIAGNOSIS — I50.41 ACUTE COMBINED SYSTOLIC AND DIASTOLIC CHF, NYHA CLASS 1 (HCC): ICD-10-CM

## 2019-12-13 DIAGNOSIS — J44.1 CHRONIC OBSTRUCTIVE PULMONARY DISEASE WITH ACUTE EXACERBATION (HCC): ICD-10-CM

## 2019-12-13 DIAGNOSIS — R19.7 DIARRHEA, UNSPECIFIED TYPE: ICD-10-CM

## 2019-12-14 LAB
C DIFF TOXIN/ANTIGEN: NORMAL
GI BACTERIAL PATHOGENS BY PCR: NORMAL

## 2019-12-15 LAB — FECAL PH: 7 (ref 5–8.5)

## 2019-12-16 ENCOUNTER — TELEPHONE (OUTPATIENT)
Dept: GASTROENTEROLOGY | Age: 74
End: 2019-12-16

## 2019-12-16 LAB — CALPROTECTIN: 57 UG/G

## 2019-12-16 NOTE — TELEPHONE ENCOUNTER
Patient called stating someone tried to call her Friday. I believe this was regarding the stool tests ordered. Patient states she is barely having any diarrhea anymore and would like to know if she should still have these tests done. She really doesn't want to unless they are necessary. Please advise.

## 2019-12-19 ENCOUNTER — OFFICE VISIT (OUTPATIENT)
Dept: BEHAVIORAL/MENTAL HEALTH CLINIC | Age: 74
End: 2019-12-19
Payer: MEDICARE

## 2019-12-19 DIAGNOSIS — F32.1 CURRENT MODERATE EPISODE OF MAJOR DEPRESSIVE DISORDER WITHOUT PRIOR EPISODE (HCC): Primary | ICD-10-CM

## 2019-12-19 PROCEDURE — 90832 PSYTX W PT 30 MINUTES: CPT | Performed by: PSYCHOLOGIST

## 2019-12-19 ASSESSMENT — PATIENT HEALTH QUESTIONNAIRE - PHQ9
9. THOUGHTS THAT YOU WOULD BE BETTER OFF DEAD, OR OF HURTING YOURSELF: 0
10. IF YOU CHECKED OFF ANY PROBLEMS, HOW DIFFICULT HAVE THESE PROBLEMS MADE IT FOR YOU TO DO YOUR WORK, TAKE CARE OF THINGS AT HOME, OR GET ALONG WITH OTHER PEOPLE: 2
SUM OF ALL RESPONSES TO PHQ QUESTIONS 1-9: 11
7. TROUBLE CONCENTRATING ON THINGS, SUCH AS READING THE NEWSPAPER OR WATCHING TELEVISION: 1
5. POOR APPETITE OR OVEREATING: 2
4. FEELING TIRED OR HAVING LITTLE ENERGY: 3
1. LITTLE INTEREST OR PLEASURE IN DOING THINGS: 1
2. FEELING DOWN, DEPRESSED OR HOPELESS: 1
SUM OF ALL RESPONSES TO PHQ QUESTIONS 1-9: 11
3. TROUBLE FALLING OR STAYING ASLEEP: 1
8. MOVING OR SPEAKING SO SLOWLY THAT OTHER PEOPLE COULD HAVE NOTICED. OR THE OPPOSITE, BEING SO FIGETY OR RESTLESS THAT YOU HAVE BEEN MOVING AROUND A LOT MORE THAN USUAL: 1
SUM OF ALL RESPONSES TO PHQ9 QUESTIONS 1 & 2: 2
6. FEELING BAD ABOUT YOURSELF - OR THAT YOU ARE A FAILURE OR HAVE LET YOURSELF OR YOUR FAMILY DOWN: 1

## 2020-01-08 ENCOUNTER — CARE COORDINATION (OUTPATIENT)
Dept: CARE COORDINATION | Age: 75
End: 2020-01-08

## 2020-01-08 NOTE — CARE COORDINATION
Telephone call with patient. She indicated that she is having better days and doing more. She feels that her behavioral health session was very helpful. Patient feels she is able to do the meals and household responsibilities. No new needs or concerns were voiced at time of phone call.

## 2020-01-13 ENCOUNTER — CARE COORDINATION (OUTPATIENT)
Dept: CARE COORDINATION | Age: 75
End: 2020-01-13

## 2020-01-14 NOTE — CARE COORDINATION
Call to Cristopher Rome to discuss health coaching. Will d/c from care coordination due to difficulty maintaining contact.  Message left

## 2020-01-27 ENCOUNTER — CARE COORDINATION (OUTPATIENT)
Dept: CARE COORDINATION | Age: 75
End: 2020-01-27

## 2020-01-27 NOTE — CARE COORDINATION
Telephone call with patient who stated doing \"so so. \"  She was explaining not feeling well. Discussed that she contact Dr. Amy Cardenas office to make an appointment. Patient stated she was going to call as soon as our call ended.

## 2020-02-05 RX ORDER — PAROXETINE HYDROCHLORIDE 40 MG/1
TABLET, FILM COATED ORAL
Qty: 30 TABLET | Refills: 0 | Status: SHIPPED | OUTPATIENT
Start: 2020-02-05 | End: 2020-05-11 | Stop reason: SDUPTHER

## 2020-02-12 ENCOUNTER — CARE COORDINATION (OUTPATIENT)
Dept: CARE COORDINATION | Age: 75
End: 2020-02-12

## 2020-02-20 ENCOUNTER — OFFICE VISIT (OUTPATIENT)
Dept: FAMILY MEDICINE CLINIC | Age: 75
End: 2020-02-20
Payer: MEDICARE

## 2020-02-20 VITALS
BODY MASS INDEX: 27.43 KG/M2 | TEMPERATURE: 98.7 F | HEIGHT: 59 IN | HEART RATE: 113 BPM | SYSTOLIC BLOOD PRESSURE: 120 MMHG | OXYGEN SATURATION: 96 % | DIASTOLIC BLOOD PRESSURE: 60 MMHG

## 2020-02-20 PROCEDURE — 3023F SPIROM DOC REV: CPT | Performed by: FAMILY MEDICINE

## 2020-02-20 PROCEDURE — 99214 OFFICE O/P EST MOD 30 MIN: CPT | Performed by: FAMILY MEDICINE

## 2020-02-20 PROCEDURE — 4040F PNEUMOC VAC/ADMIN/RCVD: CPT | Performed by: FAMILY MEDICINE

## 2020-02-20 PROCEDURE — 2022F DILAT RTA XM EVC RTNOPTHY: CPT | Performed by: FAMILY MEDICINE

## 2020-02-20 PROCEDURE — 1090F PRES/ABSN URINE INCON ASSESS: CPT | Performed by: FAMILY MEDICINE

## 2020-02-20 PROCEDURE — 1123F ACP DISCUSS/DSCN MKR DOCD: CPT | Performed by: FAMILY MEDICINE

## 2020-02-20 PROCEDURE — G8926 SPIRO NO PERF OR DOC: HCPCS | Performed by: FAMILY MEDICINE

## 2020-02-20 PROCEDURE — G8400 PT W/DXA NO RESULTS DOC: HCPCS | Performed by: FAMILY MEDICINE

## 2020-02-20 PROCEDURE — 3046F HEMOGLOBIN A1C LEVEL >9.0%: CPT | Performed by: FAMILY MEDICINE

## 2020-02-20 PROCEDURE — 3017F COLORECTAL CA SCREEN DOC REV: CPT | Performed by: FAMILY MEDICINE

## 2020-02-20 PROCEDURE — G8417 CALC BMI ABV UP PARAM F/U: HCPCS | Performed by: FAMILY MEDICINE

## 2020-02-20 PROCEDURE — G8482 FLU IMMUNIZE ORDER/ADMIN: HCPCS | Performed by: FAMILY MEDICINE

## 2020-02-20 PROCEDURE — G8427 DOCREV CUR MEDS BY ELIG CLIN: HCPCS | Performed by: FAMILY MEDICINE

## 2020-02-20 PROCEDURE — 1036F TOBACCO NON-USER: CPT | Performed by: FAMILY MEDICINE

## 2020-02-20 RX ORDER — FUROSEMIDE 40 MG/1
40 TABLET ORAL DAILY
Qty: 90 TABLET | Refills: 0 | Status: ON HOLD | OUTPATIENT
Start: 2020-02-20 | End: 2020-10-20 | Stop reason: HOSPADM

## 2020-02-20 RX ORDER — ROSUVASTATIN CALCIUM 40 MG/1
40 TABLET, COATED ORAL EVERY EVENING
Qty: 90 TABLET | Refills: 0 | Status: SHIPPED | OUTPATIENT
Start: 2020-02-20 | End: 2020-07-28 | Stop reason: SDUPTHER

## 2020-02-20 RX ORDER — SPIRONOLACTONE 25 MG/1
25 TABLET ORAL DAILY
Qty: 30 TABLET | Refills: 3 | Status: CANCELLED | OUTPATIENT
Start: 2020-02-20

## 2020-02-20 ASSESSMENT — ENCOUNTER SYMPTOMS
ABDOMINAL PAIN: 0
SHORTNESS OF BREATH: 0

## 2020-02-20 NOTE — PROGRESS NOTES
Subjective:      Patient ID: Earl Arredondo is a 75 y.o. female    Hyperlipidemia   This is a chronic problem. The current episode started more than 1 year ago. The problem is resistant. Exacerbating diseases include diabetes. Pertinent negatives include no chest pain or shortness of breath. Current antihyperlipidemic treatment includes statins. The current treatment provides mild improvement of lipids. Compliance problems include adherence to exercise and psychosocial issues. Risk factors for coronary artery disease include post-menopausal, a sedentary lifestyle, obesity, hypertension, diabetes mellitus, dyslipidemia and stress. Hypertension   Pertinent negatives include no chest pain or shortness of breath. Mental Health Problem   The primary symptoms include dysphoric mood. Additional symptoms of the illness do not include abdominal pain. Here in follow up of sorts. Not seen since fall of last year. Hx of htn and lipids and depression. Blood sugars not under good control-not seen endocrine for awhile. Not sure is she is taking some of meds on med list.     with her today. Feeling more depressed at times recently and  frustrated with limited mobility issues. Does have chronic intermittent cough over the last 4 weeks. No sore throat. Cough mostly dry but occasionally produces clear phlegm. Review of Systems   Constitutional: Negative for chills and fever. HENT: Negative for ear pain. Respiratory: Negative for shortness of breath. Cardiovascular: Negative for chest pain. Gastrointestinal: Negative for abdominal pain. Musculoskeletal: Positive for arthralgias. Skin: Negative for rash. Psychiatric/Behavioral: Positive for dysphoric mood.      Reviewed allergy, medical, social, surgical, family and med list changes and updated   Files     Social History     Socioeconomic History    Marital status:      Spouse name: None    Number of children: None    Years of education: None    Highest education level: None   Occupational History    Occupation: Retired-   Social Needs    Financial resource strain: None    Food insecurity:     Worry: None     Inability: None    Transportation needs:     Medical: None     Non-medical: None   Tobacco Use    Smoking status: Former Smoker     Packs/day: 1.00     Years: 59.00     Pack years: 59.00     Types: Cigarettes     Start date: 11/30/2017    Smokeless tobacco: Never Used    Tobacco comment: quit 2-3 weeks ago    Substance and Sexual Activity    Alcohol use: Not Currently    Drug use: No    Sexual activity: Yes   Lifestyle    Physical activity:     Days per week: None     Minutes per session: None    Stress: None   Relationships    Social connections:     Talks on phone: None     Gets together: None     Attends Buddhist service: None     Active member of club or organization: None     Attends meetings of clubs or organizations: None     Relationship status: None    Intimate partner violence:     Fear of current or ex partner: None     Emotionally abused: None     Physically abused: None     Forced sexual activity: None   Other Topics Concern    None   Social History Narrative    None     Current Outpatient Medications   Medication Sig Dispense Refill    PARoxetine (PAXIL) 40 MG tablet TAKE 1 TABLET BY MOUTH ONCE DAILY IN THE MORNING 30 tablet 0    omeprazole (PRILOSEC) 20 MG delayed release capsule Take 1 capsule by mouth every morning (before breakfast) 90 capsule 1    dicyclomine (BENTYL) 10 MG capsule Take 1 capsule by mouth 4 times daily as needed (abdominal pain) 120 capsule 3    ondansetron (ZOFRAN) 4 MG tablet Take 1 tablet by mouth every 8 hours as needed for Nausea or Vomiting 30 tablet 2    ACCU-CHEK PHYLLIS PLUS strip Test 3x daily Dx E11.65 100 each 3    insulin 70-30 (NOVOLIN 70/30) (70-30) 100 UNIT per ML injection vial 20 units twice a day if glucose more than 120 1 vial 3    Insulin Syringe-Needle U-100 (INSULIN SYRINGE 1CC/31GX5/16\") 31G X 516\" 1 ML MISC 1 each by Does not apply route daily 50 each 3    digoxin (LANOXIN) 125 MCG tablet Take 1 tablet by mouth daily 30 tablet 3    lisinopril (PRINIVIL;ZESTRIL) 10 MG tablet Take 1 tablet by mouth 2 times daily 30 tablet 3    carvedilol (COREG) 25 MG tablet Take 1 tablet by mouth 2 times daily 60 tablet 3    spironolactone (ALDACTONE) 25 MG tablet Take 1 tablet by mouth daily 30 tablet 3    hydrALAZINE (APRESOLINE) 100 MG tablet Take 1 tablet by mouth every 8 hours (Patient taking differently: Take 100 mg by mouth 2 times daily ) 60 tablet 3    furosemide (LASIX) 40 MG tablet Take 1 tablet by mouth daily 60 tablet 3    rosuvastatin (CRESTOR) 40 MG tablet Take 1 tablet by mouth every evening 90 tablet 0    aspirin 81 MG EC tablet Take 1 tablet by mouth daily 30 tablet 3     No current facility-administered medications for this visit.       Family History   Problem Relation Age of Onset    Heart Disease Father         cardiac bypass    Arthritis Father     Arthritis Mother     Other Mother          at age 80    Other Sister         [facility]    No Known Problems Daughter     Stroke Son      Past Medical History:   Diagnosis Date    Anxiety     Asthma     dx  / has smoked since age 14    CHF (congestive heart failure) ([address]     Chronic back pain     Bilateral L5 S1 Radic on emg--surprisingly worse on the left than the right--pt's symptoms and her MRI show worse on the right    Chronic obstructive pulmonary disease with acute exacerbation ([address] 10/12/2019    Depression     Fibromyalgia     Hyperlipidemia     meds > 8 yrs    Hypertension     meds > 45 yrs    Osteoarthritis     Type II diabetes mellitus, uncontrolled ([address]     hx > 8 yrs    Unspecified sleep apnea      Objective:   /60 (Site: Right Upper Arm, Position: Sitting, Cuff Size: Medium Adult)   Pulse 113   Temp 98.7 °F (37.1 °C) (Oral)   Ht 4' 11\" (1.499 m)   LMP 09/30/1995   SpO2 96%   BMI 27.43 kg/m²     Physical Exam  Neck:no carotid bruits. No masses. No adenopathy. No thyroid asymmetry. Lungs:clear and equal breath sounds. No wheezes or rales. Heart:rate reg although tachy-rate around 110. 2/6 syst murmur across precordium    No gallops   Pulses:Radials 2+ equal               D.P just palpable and equal   Extremities:no edema in either leg  Gen: In no acute distress  Abdomen; B.S present. Soft  Non tender. No hepatosplenomegaly. No masses   Patient with appropriate affect. Alert   Thought content appropriate  Good eye contact    Assessment:       Diagnosis Orders   1. Essential hypertension     2. Other hyperlipidemia     3. Major depressive disorder, recurrent, in full remission [facility])  Aracelis Kim MD, [place]   4. Chronic cough  XR CHEST STANDARD (2 VW)   5. HOCM (hypertrophic obstructive cardiomyopathy) ([address]     6. Chronic obstructive pulmonary disease, unspecified COPD type ([address]     7. Uncontrolled type 2 diabetes mellitus with complication, without long-term current use of insulin ([address]           Plan:    continue current meds for now   Depression not under good control-psychiatry referral done  Cardiomyopathy stable and followed by cardiology-continue current tx for now   Copd stable although with chronic cough-ace related?    May need pulmonary referral?  Diabetes uncontrolled-followed by endocrine but not seen in awhile-needs to see endocrine soon   Orders Placed This Encounter   Medications    furosemide (LASIX) 40 MG tablet     Sig: Take 1 tablet by mouth daily     Dispense:  90 tablet     Refill:  0    rosuvastatin (CRESTOR) 40 MG tablet     Sig: Take 1 tablet by mouth every evening     Dispense:  90 tablet     Refill:  0     Orders Placed This Encounter   Procedures    XR CHEST STANDARD (2 VW)     Standing Status:   Future     Number of Occurrences:   1     Standing Expiration Date:   2/20/2021   Arnulfo Christensen Lipid Panel     Standing Status:   Future     Standing Expiration Date:   2/20/2021     Order Specific Question:   Is Patient Fasting?/# of Hours     Answer:   9    Comprehensive Metabolic Panel     Standing Status:   Future     Standing Expiration Date:   2/20/2021    CBC Auto Differential     Standing Status:   Future     Standing Expiration Date:   8/20/2020   Rowdy Barnes MD, [place]     Referral Priority:   Routine     Referral Type:   Eval and Treat     Referral Reason:   Specialty Services Required     Referred to Provider:   Nicole Patterson MD     Requested Specialty:   Psychiatry     Number of Visits Requested:   Mary Gallo MD, Cardiology, [facility]     Referral Priority:   Routine     Referral Type:   Eval and Treat     Referral Reason:   Specialty Services Required     Referred to Provider:   Burke Shaw MD     Requested Specialty:   Cardiology     Number of Visits Requested:   Lashaun Tran MD, Endocrinology, [facility]     Referral Priority:   Routine     Referral Type:   Eval and Treat     Referral Reason:   Specialty Services Required     Referred to Provider:   Hebert Rodriguez MD     Requested Specialty:   Endocrinology     Number of Visits Requested:   1   .fasting blood work soon and f/u after done-stressed importance of follow up

## 2020-02-24 ENCOUNTER — HOSPITAL ENCOUNTER (INPATIENT)
Age: 75
LOS: 2 days | Discharge: HOME | DRG: 378 | End: 2020-02-26
Attending: EMERGENCY MEDICINE | Admitting: INTERNAL MEDICINE
Payer: MEDICARE

## 2020-02-24 ENCOUNTER — TELEPHONE (OUTPATIENT)
Dept: FAMILY MEDICINE CLINIC | Age: 75
End: 2020-02-24

## 2020-02-24 DIAGNOSIS — E78.49 OTHER HYPERLIPIDEMIA: ICD-10-CM

## 2020-02-24 DIAGNOSIS — I10 ESSENTIAL HYPERTENSION: ICD-10-CM

## 2020-02-24 PROBLEM — K92.2 UPPER GI BLEED: Status: ACTIVE | Noted: 2020-02-24

## 2020-02-24 LAB
ABO/RH: NORMAL
ALBUMIN SERPL-MCNC: 3.8 G/DL (ref 3.5–4.6)
ALP BLD-CCNC: 123 U/L (ref 40–130)
ALT SERPL-CCNC: 13 U/L (ref 0–33)
ANION GAP SERPL CALCULATED.3IONS-SCNC: 18 MEQ/L (ref 9–15)
ANISOCYTOSIS: ABNORMAL
ANTIBODY SCREEN: NORMAL
AST SERPL-CCNC: 15 U/L (ref 0–35)
BASOPHILS ABSOLUTE: 0.4 K/UL (ref 0–0.2)
BASOPHILS RELATIVE PERCENT: 4 %
BILIRUB SERPL-MCNC: <0.2 MG/DL (ref 0.2–0.7)
BUN BLDV-MCNC: 16 MG/DL (ref 8–23)
CALCIUM SERPL-MCNC: 9.3 MG/DL (ref 8.5–9.9)
CHLORIDE BLD-SCNC: 106 MEQ/L (ref 95–107)
CHOLESTEROL, TOTAL: 105 MG/DL (ref 0–199)
CO2: 18 MEQ/L (ref 20–31)
CREAT SERPL-MCNC: 1.16 MG/DL (ref 0.5–0.9)
EKG ATRIAL RATE: 103 BPM
EKG P AXIS: 75 DEGREES
EKG P-R INTERVAL: 128 MS
EKG Q-T INTERVAL: 326 MS
EKG QRS DURATION: 94 MS
EKG QTC CALCULATION (BAZETT): 427 MS
EKG R AXIS: 64 DEGREES
EKG T AXIS: 188 DEGREES
EKG VENTRICULAR RATE: 103 BPM
EOSINOPHILS ABSOLUTE: 0.6 K/UL (ref 0–0.7)
EOSINOPHILS RELATIVE PERCENT: 7 %
GFR AFRICAN AMERICAN: 55
GFR NON-AFRICAN AMERICAN: 45.5
GLOBULIN: 2.7 G/DL (ref 2.3–3.5)
GLUCOSE BLD-MCNC: 236 MG/DL (ref 70–99)
GLUCOSE BLD-MCNC: 259 MG/DL (ref 60–115)
HCT VFR BLD CALC: 17.5 % (ref 37–47)
HCT VFR BLD CALC: 18.2 % (ref 37–47)
HDLC SERPL-MCNC: 42 MG/DL (ref 40–59)
HEMOGLOBIN: 5 G/DL (ref 12–16)
HEMOGLOBIN: 5.3 G/DL (ref 12–16)
HYPOCHROMIA: ABNORMAL
INR BLD: 1
LDL CHOLESTEROL CALCULATED: 50 MG/DL (ref 0–129)
LYMPHOCYTES ABSOLUTE: 4.2 K/UL (ref 1–4.8)
LYMPHOCYTES RELATIVE PERCENT: 46 %
MCH RBC QN AUTO: 19.4 PG (ref 27–31.3)
MCH RBC QN AUTO: 19.7 PG (ref 27–31.3)
MCHC RBC AUTO-ENTMCNC: 28.8 % (ref 33–37)
MCHC RBC AUTO-ENTMCNC: 29.3 % (ref 33–37)
MCV RBC AUTO: 66.2 FL (ref 82–100)
MCV RBC AUTO: 68.4 FL (ref 82–100)
MICROCYTES: ABNORMAL
MONOCYTES ABSOLUTE: 0.1 K/UL (ref 0.2–0.8)
MONOCYTES RELATIVE PERCENT: 1.2 %
NEUTROPHILS ABSOLUTE: 3.9 K/UL (ref 1.4–6.5)
NEUTROPHILS RELATIVE PERCENT: 42 %
NUCLEATED RED BLOOD CELLS: 0 /100 WBC
PDW BLD-RTO: 18.5 % (ref 11.5–14.5)
PDW BLD-RTO: 18.6 % (ref 11.5–14.5)
PERFORMED ON: ABNORMAL
PLATELET # BLD: 555 K/UL (ref 130–400)
PLATELET # BLD: 575 K/UL (ref 130–400)
PLATELET SLIDE REVIEW: ABNORMAL
POIKILOCYTES: ABNORMAL
POTASSIUM SERPL-SCNC: 4.1 MEQ/L (ref 3.4–4.9)
PROTHROMBIN TIME: 13.7 SEC (ref 12.3–14.9)
RBC # BLD: 2.56 M/UL (ref 4.2–5.4)
RBC # BLD: 2.75 M/UL (ref 4.2–5.4)
SLIDE REVIEW: ABNORMAL
SODIUM BLD-SCNC: 142 MEQ/L (ref 135–144)
TOTAL PROTEIN: 6.5 G/DL (ref 6.3–8)
TRIGL SERPL-MCNC: 67 MG/DL (ref 0–150)
WBC # BLD: 12.8 K/UL (ref 4.8–10.8)
WBC # BLD: 9.2 K/UL (ref 4.8–10.8)

## 2020-02-24 PROCEDURE — 36415 COLL VENOUS BLD VENIPUNCTURE: CPT

## 2020-02-24 PROCEDURE — 99222 1ST HOSP IP/OBS MODERATE 55: CPT | Performed by: SPECIALIST

## 2020-02-24 PROCEDURE — 86850 RBC ANTIBODY SCREEN: CPT

## 2020-02-24 PROCEDURE — 99284 EMERGENCY DEPT VISIT MOD MDM: CPT

## 2020-02-24 PROCEDURE — 85610 PROTHROMBIN TIME: CPT

## 2020-02-24 PROCEDURE — 96374 THER/PROPH/DIAG INJ IV PUSH: CPT

## 2020-02-24 PROCEDURE — 86923 COMPATIBILITY TEST ELECTRIC: CPT

## 2020-02-24 PROCEDURE — 1210000000 HC MED SURG R&B

## 2020-02-24 PROCEDURE — 6360000002 HC RX W HCPCS: Performed by: INTERNAL MEDICINE

## 2020-02-24 PROCEDURE — 2580000003 HC RX 258: Performed by: INTERNAL MEDICINE

## 2020-02-24 PROCEDURE — 96361 HYDRATE IV INFUSION ADD-ON: CPT

## 2020-02-24 PROCEDURE — P9016 RBC LEUKOCYTES REDUCED: HCPCS

## 2020-02-24 PROCEDURE — 2580000003 HC RX 258: Performed by: EMERGENCY MEDICINE

## 2020-02-24 PROCEDURE — 86901 BLOOD TYPING SEROLOGIC RH(D): CPT

## 2020-02-24 PROCEDURE — 36430 TRANSFUSION BLD/BLD COMPNT: CPT

## 2020-02-24 PROCEDURE — 86900 BLOOD TYPING SEROLOGIC ABO: CPT

## 2020-02-24 PROCEDURE — 93005 ELECTROCARDIOGRAM TRACING: CPT | Performed by: EMERGENCY MEDICINE

## 2020-02-24 PROCEDURE — 85027 COMPLETE CBC AUTOMATED: CPT

## 2020-02-24 PROCEDURE — 2580000003 HC RX 258: Performed by: SPECIALIST

## 2020-02-24 PROCEDURE — 6370000000 HC RX 637 (ALT 250 FOR IP): Performed by: SPECIALIST

## 2020-02-24 PROCEDURE — C9113 INJ PANTOPRAZOLE SODIUM, VIA: HCPCS | Performed by: INTERNAL MEDICINE

## 2020-02-24 RX ORDER — SODIUM CHLORIDE 0.9 % (FLUSH) 0.9 %
10 SYRINGE (ML) INJECTION EVERY 12 HOURS SCHEDULED
Status: DISCONTINUED | OUTPATIENT
Start: 2020-02-24 | End: 2020-02-24 | Stop reason: SDUPTHER

## 2020-02-24 RX ORDER — PAROXETINE HYDROCHLORIDE 20 MG/1
40 TABLET, FILM COATED ORAL DAILY
Status: DISCONTINUED | OUTPATIENT
Start: 2020-02-24 | End: 2020-02-26 | Stop reason: HOSPADM

## 2020-02-24 RX ORDER — LEVOFLOXACIN 500 MG/1
500 TABLET, FILM COATED ORAL DAILY
Qty: 10 TABLET | Refills: 0 | Status: ON HOLD | OUTPATIENT
Start: 2020-02-24 | End: 2020-02-24

## 2020-02-24 RX ORDER — ROSUVASTATIN CALCIUM 40 MG/1
40 TABLET, COATED ORAL EVERY EVENING
Status: DISCONTINUED | OUTPATIENT
Start: 2020-02-24 | End: 2020-02-26 | Stop reason: HOSPADM

## 2020-02-24 RX ORDER — ACETAMINOPHEN 325 MG/1
650 TABLET ORAL EVERY 4 HOURS PRN
Status: DISCONTINUED | OUTPATIENT
Start: 2020-02-24 | End: 2020-02-26 | Stop reason: HOSPADM

## 2020-02-24 RX ORDER — SODIUM CHLORIDE 9 MG/ML
10 INJECTION INTRAVENOUS EVERY 12 HOURS
Status: DISCONTINUED | OUTPATIENT
Start: 2020-02-24 | End: 2020-02-26 | Stop reason: HOSPADM

## 2020-02-24 RX ORDER — SODIUM CHLORIDE 0.9 % (FLUSH) 0.9 %
10 SYRINGE (ML) INJECTION EVERY 12 HOURS SCHEDULED
Status: DISCONTINUED | OUTPATIENT
Start: 2020-02-24 | End: 2020-02-26 | Stop reason: HOSPADM

## 2020-02-24 RX ORDER — HYDRALAZINE HYDROCHLORIDE 50 MG/1
100 TABLET, FILM COATED ORAL 2 TIMES DAILY
Status: DISCONTINUED | OUTPATIENT
Start: 2020-02-24 | End: 2020-02-26 | Stop reason: HOSPADM

## 2020-02-24 RX ORDER — PANTOPRAZOLE SODIUM 40 MG/10ML
40 INJECTION, POWDER, LYOPHILIZED, FOR SOLUTION INTRAVENOUS EVERY 12 HOURS
Status: DISCONTINUED | OUTPATIENT
Start: 2020-02-24 | End: 2020-02-26 | Stop reason: HOSPADM

## 2020-02-24 RX ORDER — SODIUM CHLORIDE 0.9 % (FLUSH) 0.9 %
10 SYRINGE (ML) INJECTION PRN
Status: DISCONTINUED | OUTPATIENT
Start: 2020-02-24 | End: 2020-02-24 | Stop reason: SDUPTHER

## 2020-02-24 RX ORDER — INSULIN GLARGINE 100 [IU]/ML
10 INJECTION, SOLUTION SUBCUTANEOUS NIGHTLY
Status: DISCONTINUED | OUTPATIENT
Start: 2020-02-24 | End: 2020-02-26 | Stop reason: HOSPADM

## 2020-02-24 RX ORDER — HYDROCODONE BITARTRATE AND ACETAMINOPHEN 5; 325 MG/1; MG/1
1 TABLET ORAL EVERY 8 HOURS PRN
Status: DISCONTINUED | OUTPATIENT
Start: 2020-02-24 | End: 2020-02-26 | Stop reason: HOSPADM

## 2020-02-24 RX ORDER — DIGOXIN 125 MCG
125 TABLET ORAL DAILY
Status: DISCONTINUED | OUTPATIENT
Start: 2020-02-24 | End: 2020-02-26 | Stop reason: HOSPADM

## 2020-02-24 RX ORDER — DEXTROSE MONOHYDRATE 25 G/50ML
12.5 INJECTION, SOLUTION INTRAVENOUS PRN
Status: DISCONTINUED | OUTPATIENT
Start: 2020-02-24 | End: 2020-02-26 | Stop reason: HOSPADM

## 2020-02-24 RX ORDER — SODIUM CHLORIDE, SODIUM LACTATE, POTASSIUM CHLORIDE, CALCIUM CHLORIDE 600; 310; 30; 20 MG/100ML; MG/100ML; MG/100ML; MG/100ML
INJECTION, SOLUTION INTRAVENOUS CONTINUOUS
Status: DISPENSED | OUTPATIENT
Start: 2020-02-24 | End: 2020-02-25

## 2020-02-24 RX ORDER — NICOTINE POLACRILEX 4 MG
15 LOZENGE BUCCAL PRN
Status: DISCONTINUED | OUTPATIENT
Start: 2020-02-24 | End: 2020-02-26 | Stop reason: HOSPADM

## 2020-02-24 RX ORDER — LISINOPRIL 5 MG/1
10 TABLET ORAL 2 TIMES DAILY
Status: DISCONTINUED | OUTPATIENT
Start: 2020-02-24 | End: 2020-02-26 | Stop reason: HOSPADM

## 2020-02-24 RX ORDER — DEXTROSE MONOHYDRATE 50 MG/ML
100 INJECTION, SOLUTION INTRAVENOUS PRN
Status: DISCONTINUED | OUTPATIENT
Start: 2020-02-24 | End: 2020-02-26 | Stop reason: HOSPADM

## 2020-02-24 RX ORDER — PROMETHAZINE HYDROCHLORIDE 12.5 MG/1
12.5 TABLET ORAL EVERY 6 HOURS PRN
Status: DISCONTINUED | OUTPATIENT
Start: 2020-02-24 | End: 2020-02-26 | Stop reason: HOSPADM

## 2020-02-24 RX ORDER — ONDANSETRON 2 MG/ML
4 INJECTION INTRAMUSCULAR; INTRAVENOUS EVERY 6 HOURS PRN
Status: DISCONTINUED | OUTPATIENT
Start: 2020-02-24 | End: 2020-02-26 | Stop reason: HOSPADM

## 2020-02-24 RX ORDER — 0.9 % SODIUM CHLORIDE 0.9 %
20 INTRAVENOUS SOLUTION INTRAVENOUS ONCE
Status: COMPLETED | OUTPATIENT
Start: 2020-02-24 | End: 2020-02-24

## 2020-02-24 RX ORDER — SODIUM CHLORIDE 0.9 % (FLUSH) 0.9 %
10 SYRINGE (ML) INJECTION PRN
Status: DISCONTINUED | OUTPATIENT
Start: 2020-02-24 | End: 2020-02-26 | Stop reason: HOSPADM

## 2020-02-24 RX ADMIN — SODIUM CHLORIDE, POTASSIUM CHLORIDE, SODIUM LACTATE AND CALCIUM CHLORIDE: 600; 310; 30; 20 INJECTION, SOLUTION INTRAVENOUS at 23:03

## 2020-02-24 RX ADMIN — Medication 10 ML: at 23:03

## 2020-02-24 RX ADMIN — POLYETHYLENE GLYCOL 3350, SODIUM SULFATE ANHYDROUS, SODIUM BICARBONATE, SODIUM CHLORIDE, POTASSIUM CHLORIDE 4000 ML: 236; 22.74; 6.74; 5.86; 2.97 POWDER, FOR SOLUTION ORAL at 22:20

## 2020-02-24 RX ADMIN — SODIUM CHLORIDE 20 ML: 9 INJECTION, SOLUTION INTRAVENOUS at 17:33

## 2020-02-24 RX ADMIN — PANTOPRAZOLE SODIUM 40 MG: 40 INJECTION, POWDER, FOR SOLUTION INTRAVENOUS at 23:03

## 2020-02-24 RX ADMIN — Medication 10 ML: at 23:05

## 2020-02-24 ASSESSMENT — ENCOUNTER SYMPTOMS
WHEEZING: 0
VOMITING: 0
EYE DISCHARGE: 0
FACIAL SWELLING: 0
COLOR CHANGE: 0
SHORTNESS OF BREATH: 1
PHOTOPHOBIA: 0
ABDOMINAL PAIN: 0
RHINORRHEA: 0
ABDOMINAL DISTENTION: 0

## 2020-02-24 NOTE — ED NOTES
Rectal exam done by Dr. Holli Bahena. Hemoccult positive, control ok.       Jet Wright RN  02/24/20 1113

## 2020-02-24 NOTE — ED NOTES
Blood bank called by this nurse for blood, twice. No answer. Will call back.      Yomi Carrillo RN  02/24/20 8461

## 2020-02-24 NOTE — CONSULTS
Normocephalic,  scleral icterus none, conjunctival pallor  Neck: No jugular venous distention. Heart: Regular, no murmur, no rub/gallop. No right ventricular heave. Lungs: Clear to ascultation, no rales/wheezing/rhonchi. Good chest wall excursion. Abdomen: Distension none,  Soft, tenderness right side of the abdomen, Scars , Bowel sounds present  Extremities: No clubbing/cyanosis, no edema. Skin: Warm, dry, normal turgor, no rash, no bruise, no petichiae. Neuro: No myoclonus or tremor. Psych: Normal affect    Results/ Medications reviewed 2020, 6:39 PM     Laboratory, Microbiology, Pathology, Radiology, Cardiology, Medications and Transcriptions reviewed  Scheduled Meds:   sodium chloride  20 mL Intravenous Once    digoxin  125 mcg Oral Daily    hydrALAZINE  100 mg Oral BID    PARoxetine  40 mg Oral Daily    rosuvastatin  40 mg Oral QPM    lisinopril  10 mg Oral BID    sodium chloride flush  10 mL Intravenous 2 times per day    pantoprazole  40 mg Intravenous Q12H    And    sodium chloride (PF)  10 mL Intravenous Q12H    insulin lispro  0-6 Units Subcutaneous TID WC    insulin lispro  0-3 Units Subcutaneous Nightly    insulin glargine  10 Units Subcutaneous Nightly     Continuous Infusions:   lactated ringers      dextrose         Recent Labs     20  1610   WBC 9.2 12.8*   HGB 5.0* 5.3*   HCT 17.5* 18.2*   MCV 68.4* 66.2*   * 575*     Recent Labs     20  0927      K 4.1      CO2 18*   BUN 16   CREATININE 1.16*     Recent Labs     20  0927   AST 15   ALT 13   BILITOT <0.2   ALKPHOS 123     No results for input(s): LIPASE, AMYLASE in the last 72 hours.   Recent Labs     20  0920  1525   PROT 6.5  --    INR  --  1.0     Xr Chest Standard (2 Vw)    Result Date: 2020  Patient MRN: [id number] : 1945 Age:  75 years Gender: Female Order Date: 2020 11:23 AM. Exam: XR CHEST (2 VW) Number of Views: 2 Indication:

## 2020-02-24 NOTE — ED PROVIDER NOTES
Dx E11.65    ASPIRIN 81 MG EC TABLET    Take 1 tablet by mouth daily    CARVEDILOL (COREG) 25 MG TABLET    Take 1 tablet by mouth 2 times daily    DICYCLOMINE (BENTYL) 10 MG CAPSULE    Take 1 capsule by mouth 4 times daily as needed (abdominal pain)    DIGOXIN (LANOXIN) 125 MCG TABLET    Take 1 tablet by mouth daily    FUROSEMIDE (LASIX) 40 MG TABLET    Take 1 tablet by mouth daily    HYDRALAZINE (APRESOLINE) 100 MG TABLET    Take 1 tablet by mouth every 8 hours    INSULIN 70-30 (NOVOLIN 70/30) (70-30) 100 UNIT PER ML INJECTION VIAL    20 units twice a day if glucose more than 120    INSULIN SYRINGE-NEEDLE U-100 (INSULIN SYRINGE 1CC/31GX5/16\") 31G X 5/16\" 1 ML MISC    1 each by Does not apply route daily    LEVOFLOXACIN (LEVAQUIN) 500 MG TABLET    Take 1 tablet by mouth daily for 10 days    LISINOPRIL (PRINIVIL;ZESTRIL) 10 MG TABLET    Take 1 tablet by mouth 2 times daily    OMEPRAZOLE (PRILOSEC) 20 MG DELAYED RELEASE CAPSULE    Take 1 capsule by mouth every morning (before breakfast)    ONDANSETRON (ZOFRAN) 4 MG TABLET    Take 1 tablet by mouth every 8 hours as needed for Nausea or Vomiting    PAROXETINE (PAXIL) 40 MG TABLET    TAKE 1 TABLET BY MOUTH ONCE DAILY IN THE MORNING    ROSUVASTATIN (CRESTOR) 40 MG TABLET    Take 1 tablet by mouth every evening    SPIRONOLACTONE (ALDACTONE) 25 MG TABLET    Take 1 tablet by mouth daily       ALLERGIES     Ibuprofen; Metformin and related; and Darvon [propoxyphene hcl]    FAMILY HISTORY       Family History   Problem Relation Age of Onset    Heart Disease Father         cardiac bypass    Arthritis Father     Arthritis Mother     Other Mother          at age 80    Other Sister         [facility]    No Known Problems Daughter     Stroke Son           SOCIAL HISTORY       Social History     Socioeconomic History    Marital status:      Spouse name: None    Number of children: None    Years of education: None    Highest education level: None Occupational History    Occupation: Retired-   Social Needs    Financial resource strain: None    Food insecurity:     Worry: None     Inability: None    Transportation needs:     Medical: None     Non-medical: None   Tobacco Use    Smoking status: Former Smoker     Packs/day: 1.00     Years: 59.00     Pack years: 59.00     Types: Cigarettes     Start date: 11/30/2017    Smokeless tobacco: Never Used    Tobacco comment: quit 2-3 weeks ago    Substance and Sexual Activity    Alcohol use: Not Currently    Drug use: No    Sexual activity: Yes   Lifestyle    Physical activity:     Days per week: None     Minutes per session: None    Stress: None   Relationships    Social connections:     Talks on phone: None     Gets together: None     Attends [employer] service: None     Active member of club or organization: None     Attends meetings of clubs or organizations: None     Relationship status: None    Intimate partner violence:     Fear of current or ex partner: None     Emotionally abused: None     Physically abused: None     Forced sexual activity: None   Other Topics Concern    None   Social History Narrative    None       SCREENINGS      @FLOW([id number])@      PHYSICAL EXAM    (up to 7 for level 4, 8 or more for level 5)     ED Triage Vitals [02/24/20 1448]   BP Temp Temp Source Pulse Resp SpO2 Height Weight   (!) 142/62 98.7 °F (37.1 °C) Oral 105 18 100 % 4' 11\" (1.499 m) 130 lb (59 kg)       Physical Exam  Constitutional:       General: She is not in acute distress. Appearance: She is well-developed. She is not ill-appearing. HENT:      Head: Normocephalic and atraumatic. Nose: No congestion. Eyes:      Pupils: Pupils are equal, round, and reactive to light. Comments: Conjunctival pallor   Neck:      Musculoskeletal: Normal range of motion and neck supple. Cardiovascular:      Rate and Rhythm: Tachycardia present. Heart sounds: No murmur.    Pulmonary:

## 2020-02-24 NOTE — ED NOTES
Pt became progressively more light headed with change of positions for othostatic Keanu Davis, AKASH  02/24/20 5790

## 2020-02-24 NOTE — TELEPHONE ENCOUNTER
[employer] from our [employer] called to inform you of the pt critical labs Hemoglobin was 5.0 and her Hematocrit was 17.5

## 2020-02-24 NOTE — H&P
performed by Lori Hicks DO at [facility]. [address], COLON, DIAGNOSTIC      EYE SURGERY      Phaco with IOL OU / [address]    umbilical hernia repair    NH ESOPHAGOGASTRODUODENOSCOPY TRANSORAL DIAGNOSTIC N/A 3/24/2017    EGD ESOPHAGOGASTRODUODENOSCOPY performed by Harika Russell MD at [facility] N/A 2/8/2018    negative findings    NH REVISE MEDIAN N/CARPAL TUNNEL SURG Left 6/5/2017    LEFT  CARPAL TUNNEL RELEASE performed by Fabby Braden MD at [facility],2+O/J,XTIUL N/A 2/8/2018    TONSILLECTOMY      as child    UPPER GASTROINTESTINAL ENDOSCOPY  08/26/2016    w/bx        Medications Prior to Admission:    Prior to Admission medications    Medication Sig Start Date End Date Taking?  Authorizing Provider   levoFLOXacin (LEVAQUIN) 500 MG tablet Take 1 tablet by mouth daily for 10 days 2/24/20 3/5/20  Sylvie Bean MD   furosemide (LASIX) 40 MG tablet Take 1 tablet by mouth daily 2/20/20   Sylvie Bean MD   rosuvastatin (CRESTOR) 40 MG tablet Take 1 tablet by mouth every evening 2/20/20   Sylvie Bean MD   PARoxetine (PAXIL) 40 MG tablet TAKE 1 TABLET BY MOUTH ONCE DAILY IN THE MORNING 2/5/20   Sylvie Bean MD   omeprazole (PRILOSEC) 20 MG delayed release capsule Take 1 capsule by mouth every morning (before breakfast) 12/9/19   Eliazar Valerie APRN - CNP   dicyclomine (BENTYL) 10 MG capsule Take 1 capsule by mouth 4 times daily as needed (abdominal pain) 12/9/19   Eliazar Valerie, APRN - CNP   ondansetron (ZOFRAN) 4 MG tablet Take 1 tablet by mouth every 8 hours as needed for Nausea or Vomiting 12/9/19   Eliazar Valerie APRN - CNP   ACCU-CHEK PHYLLIS PLUS strip Test 3x daily Dx E11.65 12/9/19   Lucio Sargent MD   insulin 70-30 (NOVOLIN 70/30) (70-30) 100 UNIT per ML injection vial 20 units twice a day if glucose more than 120 11/20/19   Lucio Sargent MD   Insulin Syringe-Needle U-100 (INSULIN SYRINGE 1CC/31G\") 31G X \" 1 ML MISC 1 each by Does not apply route daily 19   Sebastián Tan MD   digoxin (LANOXIN) 125 MCG tablet Take 1 tablet by mouth daily 19   Claudine Boo MD   lisinopril (PRINIVIL;ZESTRIL) 10 MG tablet Take 1 tablet by mouth 2 times daily 10/16/19   Claudine Boo MD   carvedilol (COREG) 25 MG tablet Take 1 tablet by mouth 2 times daily 10/16/19   Claudine Boo MD   spironolactone (ALDACTONE) 25 MG tablet Take 1 tablet by mouth daily 10/17/19   Claudine Boo MD   hydrALAZINE (APRESOLINE) 100 MG tablet Take 1 tablet by mouth every 8 hours  Patient taking differently: Take 100 mg by mouth 2 times daily  10/12/19   Laura Baig MD       Allergies:  Ibuprofen; Metformin and related; and Darvon [propoxyphene hcl]    Social History:   TOBACCO:   reports that she has quit smoking. Her smoking use included cigarettes. She started smoking about 2 years ago. She has a 59.00 pack-year smoking history. She has never used smokeless tobacco.  ETOH:   reports previous alcohol use. Family History:       Problem Relation Age of Onset    Heart Disease Father         cardiac bypass    Arthritis Father     Arthritis Mother     Other Mother          at age 80    Other Sister         [facility]    No Known Problems Daughter     Stroke Son        REVIEW OF SYSTEMS:  Ten systems reviewed and negative except for stated in HPI    Physical Exam:    Vitals: BP (!) 168/65   Pulse 103   Temp 99.2 °F (37.3 °C) (Temporal)   Resp 20   Ht 4' 11\" (1.499 m)   Wt 130 lb (59 kg)   LMP 1995   SpO2 100%   BMI 26.26 kg/m²   General appearance: Tired, appears stated age and cooperative. NAD. Elderly  Skin: Skin color, texture, turgor normal. No rashes or lesions  HEENT: eomi, perrla. MMM  Neck: No JVD or lymphadenopathy  Lungs: CTA bilaterally. No wheeze   Heart: RRR, no murmur or gallp  Abdomen: soft, nontender. Bsx4.  No masses or

## 2020-02-25 ENCOUNTER — ANESTHESIA (OUTPATIENT)
Dept: OPERATING ROOM | Age: 75
DRG: 378 | End: 2020-02-25
Payer: MEDICARE

## 2020-02-25 ENCOUNTER — ANESTHESIA EVENT (OUTPATIENT)
Dept: OPERATING ROOM | Age: 75
DRG: 378 | End: 2020-02-25
Payer: MEDICARE

## 2020-02-25 VITALS
RESPIRATION RATE: 26 BRPM | OXYGEN SATURATION: 100 % | SYSTOLIC BLOOD PRESSURE: 142 MMHG | DIASTOLIC BLOOD PRESSURE: 65 MMHG

## 2020-02-25 LAB
ANION GAP SERPL CALCULATED.3IONS-SCNC: 15 MEQ/L (ref 9–15)
BLOOD BANK DISPENSE STATUS: NORMAL
BLOOD BANK DISPENSE STATUS: NORMAL
BLOOD BANK PRODUCT CODE: NORMAL
BLOOD BANK PRODUCT CODE: NORMAL
BPU ID: NORMAL
BPU ID: NORMAL
BUN BLDV-MCNC: 11 MG/DL (ref 8–23)
CALCIUM SERPL-MCNC: 9.2 MG/DL (ref 8.5–9.9)
CHLORIDE BLD-SCNC: 103 MEQ/L (ref 95–107)
CO2: 20 MEQ/L (ref 20–31)
CREAT SERPL-MCNC: 1.19 MG/DL (ref 0.5–0.9)
DESCRIPTION BLOOD BANK: NORMAL
DESCRIPTION BLOOD BANK: NORMAL
GFR AFRICAN AMERICAN: 53.4
GFR NON-AFRICAN AMERICAN: 44.2
GLUCOSE BLD-MCNC: 118 MG/DL (ref 70–99)
GLUCOSE BLD-MCNC: 154 MG/DL (ref 60–115)
GLUCOSE BLD-MCNC: 180 MG/DL (ref 60–115)
GLUCOSE BLD-MCNC: 184 MG/DL (ref 60–115)
GLUCOSE BLD-MCNC: 321 MG/DL (ref 60–115)
HBA1C MFR BLD: 6.9 % (ref 4.8–5.9)
HCT VFR BLD CALC: 25 % (ref 37–47)
HEMOGLOBIN: 8 G/DL (ref 12–16)
MCH RBC QN AUTO: 23.9 PG (ref 27–31.3)
MCHC RBC AUTO-ENTMCNC: 32.1 % (ref 33–37)
MCV RBC AUTO: 74.6 FL (ref 82–100)
PDW BLD-RTO: 24.6 % (ref 11.5–14.5)
PERFORMED ON: ABNORMAL
PLATELET # BLD: 455 K/UL (ref 130–400)
POTASSIUM SERPL-SCNC: 3.5 MEQ/L (ref 3.4–4.9)
RBC # BLD: 3.35 M/UL (ref 4.2–5.4)
SODIUM BLD-SCNC: 138 MEQ/L (ref 135–144)
WBC # BLD: 10.2 K/UL (ref 4.8–10.8)

## 2020-02-25 PROCEDURE — 7100000000 HC PACU RECOVERY - FIRST 15 MIN: Performed by: SPECIALIST

## 2020-02-25 PROCEDURE — 2580000003 HC RX 258: Performed by: SPECIALIST

## 2020-02-25 PROCEDURE — 2709999900 HC NON-CHARGEABLE SUPPLY: Performed by: SPECIALIST

## 2020-02-25 PROCEDURE — 3700000001 HC ADD 15 MINUTES (ANESTHESIA): Performed by: SPECIALIST

## 2020-02-25 PROCEDURE — 6360000002 HC RX W HCPCS: Performed by: NURSE ANESTHETIST, CERTIFIED REGISTERED

## 2020-02-25 PROCEDURE — 0DB68ZX EXCISION OF STOMACH, VIA NATURAL OR ARTIFICIAL OPENING ENDOSCOPIC, DIAGNOSTIC: ICD-10-PCS | Performed by: SPECIALIST

## 2020-02-25 PROCEDURE — C9113 INJ PANTOPRAZOLE SODIUM, VIA: HCPCS | Performed by: INTERNAL MEDICINE

## 2020-02-25 PROCEDURE — 7100000001 HC PACU RECOVERY - ADDTL 15 MIN: Performed by: SPECIALIST

## 2020-02-25 PROCEDURE — 1210000000 HC MED SURG R&B

## 2020-02-25 PROCEDURE — 2580000003 HC RX 258: Performed by: INTERNAL MEDICINE

## 2020-02-25 PROCEDURE — 6370000000 HC RX 637 (ALT 250 FOR IP): Performed by: INTERNAL MEDICINE

## 2020-02-25 PROCEDURE — 3609017100 HC EGD: Performed by: SPECIALIST

## 2020-02-25 PROCEDURE — 83036 HEMOGLOBIN GLYCOSYLATED A1C: CPT

## 2020-02-25 PROCEDURE — 3700000000 HC ANESTHESIA ATTENDED CARE: Performed by: SPECIALIST

## 2020-02-25 PROCEDURE — 80048 BASIC METABOLIC PNL TOTAL CA: CPT

## 2020-02-25 PROCEDURE — 88305 TISSUE EXAM BY PATHOLOGIST: CPT

## 2020-02-25 PROCEDURE — 6360000002 HC RX W HCPCS: Performed by: INTERNAL MEDICINE

## 2020-02-25 PROCEDURE — 2580000003 HC RX 258: Performed by: NURSE ANESTHETIST, CERTIFIED REGISTERED

## 2020-02-25 PROCEDURE — 36415 COLL VENOUS BLD VENIPUNCTURE: CPT

## 2020-02-25 PROCEDURE — 85027 COMPLETE CBC AUTOMATED: CPT

## 2020-02-25 PROCEDURE — 88342 IMHCHEM/IMCYTCHM 1ST ANTB: CPT

## 2020-02-25 PROCEDURE — 43239 EGD BIOPSY SINGLE/MULTIPLE: CPT | Performed by: SPECIALIST

## 2020-02-25 PROCEDURE — 2580000003 HC RX 258

## 2020-02-25 RX ORDER — CARVEDILOL 25 MG/1
25 TABLET ORAL 2 TIMES DAILY
Status: DISCONTINUED | OUTPATIENT
Start: 2020-02-25 | End: 2020-02-26 | Stop reason: HOSPADM

## 2020-02-25 RX ORDER — METOPROLOL TARTRATE 5 MG/5ML
5 INJECTION INTRAVENOUS EVERY 6 HOURS PRN
Status: DISCONTINUED | OUTPATIENT
Start: 2020-02-25 | End: 2020-02-26 | Stop reason: HOSPADM

## 2020-02-25 RX ORDER — LIDOCAINE HYDROCHLORIDE 20 MG/ML
INJECTION, SOLUTION INTRAVENOUS PRN
Status: DISCONTINUED | OUTPATIENT
Start: 2020-02-25 | End: 2020-02-25 | Stop reason: SDUPTHER

## 2020-02-25 RX ORDER — PROPOFOL 10 MG/ML
INJECTION, EMULSION INTRAVENOUS PRN
Status: DISCONTINUED | OUTPATIENT
Start: 2020-02-25 | End: 2020-02-25 | Stop reason: SDUPTHER

## 2020-02-25 RX ORDER — SODIUM CHLORIDE, SODIUM LACTATE, POTASSIUM CHLORIDE, CALCIUM CHLORIDE 600; 310; 30; 20 MG/100ML; MG/100ML; MG/100ML; MG/100ML
INJECTION, SOLUTION INTRAVENOUS CONTINUOUS PRN
Status: DISCONTINUED | OUTPATIENT
Start: 2020-02-25 | End: 2020-02-25 | Stop reason: SDUPTHER

## 2020-02-25 RX ORDER — MAGNESIUM HYDROXIDE 1200 MG/15ML
LIQUID ORAL PRN
Status: DISCONTINUED | OUTPATIENT
Start: 2020-02-25 | End: 2020-02-25 | Stop reason: ALTCHOICE

## 2020-02-25 RX ORDER — HYDRALAZINE HYDROCHLORIDE 20 MG/ML
10 INJECTION INTRAMUSCULAR; INTRAVENOUS EVERY 6 HOURS PRN
Status: DISCONTINUED | OUTPATIENT
Start: 2020-02-25 | End: 2020-02-26 | Stop reason: HOSPADM

## 2020-02-25 RX ORDER — SODIUM CHLORIDE 9 MG/ML
INJECTION, SOLUTION INTRAVENOUS
Status: COMPLETED
Start: 2020-02-25 | End: 2020-02-25

## 2020-02-25 RX ADMIN — HYDRALAZINE HYDROCHLORIDE 100 MG: 50 TABLET, FILM COATED ORAL at 22:09

## 2020-02-25 RX ADMIN — Medication 10 ML: at 22:18

## 2020-02-25 RX ADMIN — SODIUM CHLORIDE, POTASSIUM CHLORIDE, SODIUM LACTATE AND CALCIUM CHLORIDE: 600; 310; 30; 20 INJECTION, SOLUTION INTRAVENOUS at 11:29

## 2020-02-25 RX ADMIN — CARVEDILOL 25 MG: 25 TABLET, FILM COATED ORAL at 22:09

## 2020-02-25 RX ADMIN — ROSUVASTATIN CALCIUM 40 MG: 40 TABLET, FILM COATED ORAL at 17:15

## 2020-02-25 RX ADMIN — PANTOPRAZOLE SODIUM 40 MG: 40 INJECTION, POWDER, FOR SOLUTION INTRAVENOUS at 05:34

## 2020-02-25 RX ADMIN — HYDRALAZINE HYDROCHLORIDE 10 MG: 20 INJECTION INTRAMUSCULAR; INTRAVENOUS at 02:23

## 2020-02-25 RX ADMIN — INSULIN GLARGINE 10 UNITS: 100 INJECTION, SOLUTION SUBCUTANEOUS at 22:17

## 2020-02-25 RX ADMIN — INSULIN LISPRO 1 UNITS: 100 INJECTION, SOLUTION INTRAVENOUS; SUBCUTANEOUS at 22:16

## 2020-02-25 RX ADMIN — SODIUM CHLORIDE 250 ML: 9 INJECTION, SOLUTION INTRAVENOUS at 00:24

## 2020-02-25 RX ADMIN — PROPOFOL 20 MG: 10 INJECTION, EMULSION INTRAVENOUS at 12:15

## 2020-02-25 RX ADMIN — LIDOCAINE HYDROCHLORIDE 50 MG: 20 INJECTION, SOLUTION INTRAVENOUS at 12:13

## 2020-02-25 RX ADMIN — LISINOPRIL 10 MG: 5 TABLET ORAL at 22:09

## 2020-02-25 RX ADMIN — PANTOPRAZOLE SODIUM 40 MG: 40 INJECTION, POWDER, FOR SOLUTION INTRAVENOUS at 17:16

## 2020-02-25 RX ADMIN — HYDRALAZINE HYDROCHLORIDE 10 MG: 20 INJECTION INTRAMUSCULAR; INTRAVENOUS at 14:25

## 2020-02-25 RX ADMIN — PROPOFOL 20 MG: 10 INJECTION, EMULSION INTRAVENOUS at 12:16

## 2020-02-25 RX ADMIN — PROPOFOL 30 MG: 10 INJECTION, EMULSION INTRAVENOUS at 12:14

## 2020-02-25 RX ADMIN — PROPOFOL 20 MG: 10 INJECTION, EMULSION INTRAVENOUS at 12:18

## 2020-02-25 RX ADMIN — Medication 10 ML: at 05:34

## 2020-02-25 RX ADMIN — Medication 10 ML: at 17:16

## 2020-02-25 RX ADMIN — PROPOFOL 60 MG: 10 INJECTION, EMULSION INTRAVENOUS at 12:13

## 2020-02-25 ASSESSMENT — COPD QUESTIONNAIRES: CAT_SEVERITY: NO INTERVAL CHANGE

## 2020-02-25 ASSESSMENT — PULMONARY FUNCTION TESTS
PIF_VALUE: 0
PIF_VALUE: 0
PIF_VALUE: 1
PIF_VALUE: 0
PIF_VALUE: 1

## 2020-02-25 ASSESSMENT — PAIN SCALES - GENERAL
PAINLEVEL_OUTOF10: 0

## 2020-02-25 NOTE — ANESTHESIA PRE PROCEDURE
showed FU 1/10/17 Dr Hillary Beverly Z91.19    Insomnia secondary to chronic pain G89.29, G47.01    Reactive depression F32.9    Diabetic radiculopathy ([facility]) E11.49, M54.10    Cervical radicular pain M54.12    Diabetic asymmetric polyneuropathy (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) I50.41    PMB (postmenopausal bleeding) N95.0    Acute on chronic diastolic heart failure ([facility]) I50.33    CHF (congestive heart failure) ([facility]) I50.9    Hypertensive urgency I16.0    Uncontrolled type 2 diabetes mellitus with hyperglycemia ([facility]) E11.65    Chronic obstructive pulmonary disease with acute exacerbation ([facility]) J44.1    Acute on chronic diastolic (congestive) heart failure ([facility]) I50.33    SAÚL (acute kidney injury) ([facility]) N17.9    Hypoglycemia E16.2    HOCM (hypertrophic obstructive cardiomyopathy) ([facility]) I42.1    Upper GI bleed K92.2       Past Medical History:        Diagnosis Date    Anxiety     Asthma     dx 2019 / has smoked since age 11    CHF (congestive heart failure) ([facility])     Chronic back pain     Bilateral L5 S1 Radic on emg--surprisingly worse on the left than the right--pt's symptoms and her MRI show worse on the right    Chronic obstructive pulmonary disease with acute exacerbation ([facility] 10/12/2019    Depression     Fibromyalgia     Hyperlipidemia     meds > 8 yrs    Hypertension     meds > 45 yrs    Osteoarthritis     Type II diabetes mellitus, uncontrolled ([facility])     hx > 8 yrs    Unspecified sleep apnea        Past Surgical History:        Procedure Laterality Date    BACK SURGERY  2017    lumbar disc    CARDIAC CATHETERIZATION  11/3/14     DR. MIRELES / no stents    COLONOSCOPY  08/29/2016    w/polypectomy     DILATION AND CURETTAGE OF UTERUS N/A 10/7/2019    EUA HYSTEROSCOPY DILATATION AND CURETTAGE performed by Ellen Taylor DO at [facility]. [address], COLON, DIAGNOSTIC      EYE SURGERY      [place] with IOL OU / [address]    umbilical hernia repair    OR ESOPHAGOGASTRODUODENOSCOPY TRANSORAL DIAGNOSTIC N/A 3/24/2017    EGD ESOPHAGOGASTRODUODENOSCOPY performed by Ekaterina Hollis MD at [facility] N/A 2/8/2018    negative findings    OR REVISE MEDIAN N/CARPAL TUNNEL SURG Left 6/5/2017    LEFT  CARPAL TUNNEL RELEASE performed by Balbina Art MD at [facility],3+K/Y,FUCAK N/A 2/8/2018    TONSILLECTOMY      as child    UPPER GASTROINTESTINAL ENDOSCOPY  08/26/2016    w/bx        Social History:    Social History     Tobacco Use    Smoking status: Former Smoker     Packs/day: 1.00     Years: 59.00     Pack years: 59.00     Types: Cigarettes     Start date: 11/30/2017    Smokeless tobacco: Never Used    Tobacco comment: quit 2-3 weeks ago    Substance Use Topics    Alcohol use: Not Currently                                Counseling given: Not Answered  Comment: quit 2-3 weeks ago       Vital Signs (Current):   Vitals:    02/25/20 0736 02/25/20 0820 02/25/20 1030 02/25/20 1046   BP: (!) 177/71  (!) 168/63 (!) 189/84   Pulse: 105 105 100 96   Resp:    20   Temp: 37.3 °C (99.1 °F)   36.8 °C (98.3 °F)   TempSrc:    Temporal   SpO2: 100%   96%   Weight:       Height:                                                  BP Readings from Last 3 Encounters:   02/25/20 (!) 189/84   02/20/20 120/60   12/09/19 110/62       NPO Status: Time of last liquid consumption: 0000                        Time of last solid consumption: 0000                        Date of last liquid consumption: 02/25/20                        Date of last solid food consumption: 02/25/20    BMI:   Wt Readings from Last 3 Encounters:   02/24/20 130 lb (59 kg)   11/11/19 135 lb 12.8 oz (61.6 kg)   11/07/19 135 lb (61.2 kg) Vascular:                                        Anesthesia Plan      MAC     ASA 4             Anesthetic plan and risks discussed with patient. Plan discussed with attending.                   JOHNATHON Nguyen - CRNA   2/25/2020

## 2020-02-25 NOTE — OP NOTE
Endoscopy Note    Patient: Meek Wynne  YOB: 1945  MRN: [id number]  Date of Procedure: 2/25/2020    Pre-Op Diagnosis: gi bleeding, anemia    Post-Op Diagnosis: Antral erosions and 2 small AVM of the duodenum       Procedure(s):  EGD possible biopsy    Anesthesia: Monitor Anesthesia Care    Surgeon(s):  Jeff Emery MD    Staff:  * No surgical staff found *     Estimated Blood Loss: Minimal    Specimens:   ID Type Source Tests Collected by Time Destination   A : [address] [place] [address] MD Katarzyna 2/25/2020 [id number]        Indications: This is a 75y.o. year old female who presents for upper endoscopy with h/o anemia, abdominal pain and heme positive stool. Patient was scheduled for EGD and colonoscopy but she did not take the prep and hence only EGD was done. Description of Procedure:  Informed consent was obtained from the patient after explanation of indications, benefits and possible risks and complications of the procedure. The patient was then taken to the endoscopy suite, placed in the left lateral decubitus position and the above IV sedation was administrered. Olympus video gastroscope was inserted into the esophagus under direct visualization and advanced up to the distal duodenum. Esophageal mucosa is normal.  GE junction is about 37 cm from the incisors. Stomach fundus body antrum and pylorus was examined which showed few erosions in the antral area. Biopsies were taken from the antrum. Duodenal bulb and post bulbar duodenum was examined. There were 2 small 1 to 2 mm nonbleeding AVMs seen in the second part of the duodenum. The patient tolerated the procedure well and was taken to the post anesthesia care unit in good condition.     Impression: Antral erosions and small AVMs of the duodenum, I am not sure this explains the significant anemia,    Recommendations: Continue Protonix and would recommend a colonoscopy to rule out any colonic

## 2020-02-25 NOTE — ANESTHESIA POSTPROCEDURE EVALUATION
Department of Anesthesiology  Postprocedure Note    Patient: Shannon Castaneda  MRN: [id number]  YOB: 1945  Date of evaluation: 2/25/2020  Time:  12:33 PM     Procedure Summary     Date:  02/25/20 Room / Location:  [address]    Anesthesia Start:  1211 Anesthesia Stop:      Procedure:  EGD possible biopsy (N/A Mouth) Diagnosis:  (gi bleeding)    Surgeon:  Brabara Evangelista MD Responsible Provider:  JOHNATHON Ramsay CRNA    Anesthesia Type:  MAC ASA Status:  4          Anesthesia Type: MAC    Kristina Phase I:      Kristina Phase II:      Last vitals: Reviewed and per EMR flowsheets.        Anesthesia Post Evaluation    Patient location during evaluation: PACU  Patient participation: complete - patient participated  Level of consciousness: awake and alert  Pain score: 1  Airway patency: patent  Nausea & Vomiting: no nausea and no vomiting  Complications: no  Cardiovascular status: hemodynamically stable  Respiratory status: acceptable and nasal cannula  Hydration status: euvolemic  Comments: Report to RN, normal sinus rhythm

## 2020-02-25 NOTE — PROGRESS NOTES
2300 Pt had 250 ml of Golytely so far, refusing to drink more. Dr Argentina Martinez was notified, pt also was asking for apple sauce so she could take her evening medications, Dr. Argentina Martinez said it was ok to have one apple sauce but when I came with medications and apple sauce, pt refused to take her evening medications, including insulin,  again. 18 Pt was notified that the Dr was aware of her refusing Golytely, pt tried to drink more. Still no BM.
Pt drank 1 L of Golytely and had 2 watery BMs after it. Blood transfusion is complete, pt tolerated without complications. Dr. Valencia Marlow was notified of the pt's /67, Hydralazine IV was ordered and give. The following BP was 156/62, remesured in 25 minutes 133/57.
twice daily. IVF support  -will discuss colonoscopy with patient  -Baby aspirin is for primary prevention-can discontinue at discharge     2. Insulin-dependent type 2 diabetes: Optimize regimen     3. Chronic diastolic heart failure: reintroduce home antihypertensive medications and diuretics     4.   Chronic opioid use for arthritis: Continue home regimen     SCDs  Full code    Electronically signed by Doug Dee DO on 2/25/2020 at 3:22 PM

## 2020-02-26 VITALS
BODY MASS INDEX: 26.98 KG/M2 | HEART RATE: 95 BPM | DIASTOLIC BLOOD PRESSURE: 40 MMHG | HEIGHT: 59 IN | SYSTOLIC BLOOD PRESSURE: 138 MMHG | RESPIRATION RATE: 16 BRPM | OXYGEN SATURATION: 100 % | TEMPERATURE: 99 F | WEIGHT: 133.82 LBS

## 2020-02-26 LAB
ANION GAP SERPL CALCULATED.3IONS-SCNC: 14 MEQ/L (ref 9–15)
BUN BLDV-MCNC: 15 MG/DL (ref 8–23)
CALCIUM SERPL-MCNC: 8.9 MG/DL (ref 8.5–9.9)
CHLORIDE BLD-SCNC: 107 MEQ/L (ref 95–107)
CO2: 20 MEQ/L (ref 20–31)
CREAT SERPL-MCNC: 1.29 MG/DL (ref 0.5–0.9)
GFR AFRICAN AMERICAN: 48.7
GFR NON-AFRICAN AMERICAN: 40.2
GLUCOSE BLD-MCNC: 216 MG/DL (ref 70–99)
GLUCOSE BLD-MCNC: 257 MG/DL (ref 60–115)
GLUCOSE BLD-MCNC: 267 MG/DL (ref 60–115)
HCT VFR BLD CALC: 24.4 % (ref 37–47)
HEMOGLOBIN: 7.9 G/DL (ref 12–16)
MCH RBC QN AUTO: 23.8 PG (ref 27–31.3)
MCHC RBC AUTO-ENTMCNC: 32.3 % (ref 33–37)
MCV RBC AUTO: 73.7 FL (ref 82–100)
PDW BLD-RTO: 25.4 % (ref 11.5–14.5)
PERFORMED ON: ABNORMAL
PERFORMED ON: ABNORMAL
PLATELET # BLD: 460 K/UL (ref 130–400)
POTASSIUM SERPL-SCNC: 4.1 MEQ/L (ref 3.4–4.9)
RBC # BLD: 3.31 M/UL (ref 4.2–5.4)
SODIUM BLD-SCNC: 141 MEQ/L (ref 135–144)
WBC # BLD: 11.4 K/UL (ref 4.8–10.8)

## 2020-02-26 PROCEDURE — 85027 COMPLETE CBC AUTOMATED: CPT

## 2020-02-26 PROCEDURE — 2580000003 HC RX 258: Performed by: SPECIALIST

## 2020-02-26 PROCEDURE — 6360000002 HC RX W HCPCS: Performed by: INTERNAL MEDICINE

## 2020-02-26 PROCEDURE — 96376 TX/PRO/DX INJ SAME DRUG ADON: CPT

## 2020-02-26 PROCEDURE — 36415 COLL VENOUS BLD VENIPUNCTURE: CPT

## 2020-02-26 PROCEDURE — 6370000000 HC RX 637 (ALT 250 FOR IP): Performed by: INTERNAL MEDICINE

## 2020-02-26 PROCEDURE — 80048 BASIC METABOLIC PNL TOTAL CA: CPT

## 2020-02-26 PROCEDURE — C9113 INJ PANTOPRAZOLE SODIUM, VIA: HCPCS | Performed by: INTERNAL MEDICINE

## 2020-02-26 RX ORDER — PANTOPRAZOLE SODIUM 40 MG/1
TABLET, DELAYED RELEASE ORAL
Qty: 60 TABLET | Refills: 3 | Status: SHIPPED | OUTPATIENT
Start: 2020-02-26 | End: 2021-05-25 | Stop reason: ALTCHOICE

## 2020-02-26 RX ADMIN — Medication 10 ML: at 06:39

## 2020-02-26 RX ADMIN — CARVEDILOL 25 MG: 25 TABLET, FILM COATED ORAL at 09:43

## 2020-02-26 RX ADMIN — LISINOPRIL 10 MG: 5 TABLET ORAL at 09:42

## 2020-02-26 RX ADMIN — Medication 10 ML: at 09:44

## 2020-02-26 RX ADMIN — DIGOXIN 125 MCG: 125 TABLET ORAL at 09:43

## 2020-02-26 RX ADMIN — PANTOPRAZOLE SODIUM 40 MG: 40 INJECTION, POWDER, FOR SOLUTION INTRAVENOUS at 06:39

## 2020-02-26 RX ADMIN — PAROXETINE HYDROCHLORIDE 40 MG: 20 TABLET, FILM COATED ORAL at 09:42

## 2020-02-26 ASSESSMENT — PAIN SCALES - GENERAL
PAINLEVEL_OUTOF10: 0

## 2020-02-26 NOTE — DISCHARGE INSTR - COC
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    Osteoarthritis of spine with radiculopathy, lumbar region M47.26    Acute combined systolic and diastolic CHF, NYHA class 1 ([facility]) I50.41    PMB (postmenopausal bleeding) N95.0    Acute on chronic diastolic heart failure ([facility]) I50.33    CHF (congestive heart failure) ([facility]) I50.9    Hypertensive urgency I16.0    Uncontrolled type 2 diabetes mellitus with hyperglycemia ([facility]) E11.65    Chronic obstructive pulmonary disease with acute exacerbation ([facility]) J44.1    Acute on chronic diastolic (congestive) heart failure ([facility]) I50.33    SAÚL (acute kidney injury) ([facility]) N17.9    Hypoglycemia E16.2    HOCM (hypertrophic obstructive cardiomyopathy) ([facility]) I42.1    Gastrointestinal hemorrhage K92.2       Isolation/Infection:   Isolation          Contact        Patient Infection Status     Infection Onset Added Last Indicated Last Indicated By Review Planned Expiration Resolved Resolved By    ESBL (Extended Spectrum Beta Lactamase) 09/27/19 09/30/19 10/23/19 Urine Culture        E. Coli ESBL urine on 10/23/2019. Electronically signed by Lakia Miller RN on 10/28/2019 at 8:48 AM     E. Coli ESBL urine on 9/27/2019.  Electronically signed by Lakia Miller RN on 10/1/2019 at 8:53 AM     Resolved    C-diff Rule Out  12/13/19 12/13/19 Clostridium Difficile Toxin/Antigen (Ordered)   12/14/19 Rule-Out Order Resulted          Nurse Assessment:  Last Vital Signs: BP (!) 138/40   Pulse 95   Temp 99 °F (37.2 °C) (Oral)   Resp 16   Ht 4' 11\" (1.499 m)   Wt 133 lb 13.1 oz (60.7 kg)   LMP 09/30/1995   SpO2 100%   BMI 27.03 kg/m²     Last

## 2020-02-26 NOTE — DISCHARGE SUMMARY
[facility] Medicine Discharge Summary    Dana Herndon  :  1945  MRN:  [id number]    Admit date:  2020  Discharge date:  2020    Admitting Physician:  Rafaela Nunez DO  Primary Care Physician:  Sylvie Bean MD    Discharge Diagnoses:    Principal Problem:    Gastrointestinal hemorrhage  Resolved Problems:    * No resolved hospital problems. *    Chief Complaint   Patient presents with    Other     per pt, Josie's nurse called and old her to come to ed \"blood count 5\"       Condition: improved  Activity: no restrictions   Diet: diabetic  Disposition: home  Functional Status: ambulatory    Significant Findings:     Recent Labs     20  0523 20  0524 20  1610   WBC 11.4* 10.2 12.8*   HGB 7.9* 8.0* 5.3*   HCT 24.4* 25.0* 18.2*   MCV 73.7* 74.6* 66.2*   * 455* 575*     EGD:  Antral erosions and small AVMs of the duodenum    Hospital Course:   65-year-old female with history of insulin-dependent type 2 diabetes, chronic opioid use for arthritis, tobacco use disorder, chronic diastolic heart failure presented with 2 weeks of weakness in the fatigue and new hemoglobin of 5. She was found to have grossly melanotic stool on rectal exam in the emergency department. She was transfused to hemoglobin over 7 and subsequent EGD revealed antral erosions and small AVMs of the duodenum. Because it was not clear that these findings fully explained the anemia, GI recommended colonoscopy however patient could not tolerate the bowel prep and decided she would try again as an outpatient in 2 to 4 weeks. Her hemoglobin remained stable and she had no further bleeding during the hospitalization. She was asymptomatic at the time of discharge. She had been on baby aspirin daily for primary prevention-this was discontinued at discharge. She was prescribed Protonix to take twice daily.     Exam on Discharge:   BP (!) 138/40   Pulse 95   Temp 99 °F (37.2

## 2020-02-27 PROCEDURE — 93010 ELECTROCARDIOGRAM REPORT: CPT | Performed by: INTERNAL MEDICINE

## 2020-02-28 ENCOUNTER — CARE COORDINATION (OUTPATIENT)
Dept: CARE COORDINATION | Age: 75
End: 2020-02-28

## 2020-02-28 ENCOUNTER — CARE COORDINATION (OUTPATIENT)
Dept: CASE MANAGEMENT | Age: 75
End: 2020-02-28

## 2020-02-28 PROBLEM — R10.31 ABDOMINAL PAIN, RIGHT LOWER QUADRANT: Status: RESOLVED | Noted: 2018-02-02 | Resolved: 2020-02-28

## 2020-02-28 PROCEDURE — 1111F DSCHRG MED/CURRENT MED MERGE: CPT | Performed by: FAMILY MEDICINE

## 2020-02-28 RX ORDER — HYDRALAZINE HYDROCHLORIDE 100 MG/1
100 TABLET, FILM COATED ORAL 2 TIMES DAILY
Status: ON HOLD | COMMUNITY
End: 2020-10-20 | Stop reason: HOSPADM

## 2020-02-28 NOTE — CARE COORDINATION
Appointments   Date Time Provider [facility] Adeline   3/2/2020 11:15 AM Omaira Rebolledo  [place] [address]   3/16/2020 11:15 AM Hebert Rodriguez  [facility]

## 2020-02-28 NOTE — CARE COORDINATION
Telephone call to home of patient. Spoke with Delano/spouse who indicated that patient was sleeping. Left message for patient to return phone call. Call back number was provided.

## 2020-03-02 ENCOUNTER — OFFICE VISIT (OUTPATIENT)
Dept: FAMILY MEDICINE CLINIC | Age: 75
End: 2020-03-02
Payer: MEDICARE

## 2020-03-02 VITALS
HEART RATE: 80 BPM | DIASTOLIC BLOOD PRESSURE: 60 MMHG | OXYGEN SATURATION: 98 % | SYSTOLIC BLOOD PRESSURE: 138 MMHG | BODY MASS INDEX: 26.81 KG/M2 | HEIGHT: 59 IN | TEMPERATURE: 99 F | WEIGHT: 133 LBS | RESPIRATION RATE: 16 BRPM

## 2020-03-02 PROCEDURE — 1111F DSCHRG MED/CURRENT MED MERGE: CPT | Performed by: FAMILY MEDICINE

## 2020-03-02 PROCEDURE — 99495 TRANSJ CARE MGMT MOD F2F 14D: CPT | Performed by: FAMILY MEDICINE

## 2020-03-02 RX ORDER — AZITHROMYCIN 250 MG/1
250 TABLET, FILM COATED ORAL SEE ADMIN INSTRUCTIONS
Qty: 6 TABLET | Refills: 0 | Status: SHIPPED | OUTPATIENT
Start: 2020-03-02 | End: 2020-03-07

## 2020-03-02 NOTE — PROGRESS NOTES
(1st dose 30min before breakfast) for 30 days. After 30 days, you make take 40mg once daily before breakfast.             PARoxetine (PAXIL) 40 MG tablet  TAKE 1 TABLET BY MOUTH ONCE DAILY IN THE MORNING             rosuvastatin (CRESTOR) 40 MG tablet  Take 1 tablet by mouth every evening             spironolactone (ALDACTONE) 25 MG tablet  Take 1 tablet by mouth daily                   Medications marked \"taking\" at this time  Outpatient Medications Marked as Taking for the 3/2/20 encounter (Office Visit) with Bebe Avina MD   Medication Sig Dispense Refill    hydrALAZINE (APRESOLINE) 100 MG tablet Take 100 mg by mouth 2 times daily      pantoprazole (PROTONIX) 40 MG tablet Take 40mg twice daily (1st dose 30min before breakfast) for 30 days.  After 30 days, you make take 40mg once daily before breakfast. 60 tablet 3    furosemide (LASIX) 40 MG tablet Take 1 tablet by mouth daily 90 tablet 0    rosuvastatin (CRESTOR) 40 MG tablet Take 1 tablet by mouth every evening 90 tablet 0    PARoxetine (PAXIL) 40 MG tablet TAKE 1 TABLET BY MOUTH ONCE DAILY IN THE MORNING 30 tablet 0    dicyclomine (BENTYL) 10 MG capsule Take 1 capsule by mouth 4 times daily as needed (abdominal pain) 120 capsule 3    ondansetron (ZOFRAN) 4 MG tablet Take 1 tablet by mouth every 8 hours as needed for Nausea or Vomiting 30 tablet 2    ACCU-CHEK PHYLLIS PLUS strip Test 3x daily Dx E11.65 100 each 3    insulin 70-30 (NOVOLIN 70/30) (70-30) 100 UNIT per ML injection vial 20 units twice a day if glucose more than 120 1 vial 3    Insulin Syringe-Needle U-100 (INSULIN SYRINGE 1CC/31GX5/16\") 31G X 5/16\" 1 ML MISC 1 each by Does not apply route daily 50 each 3    digoxin (LANOXIN) 125 MCG tablet Take 1 tablet by mouth daily 30 tablet 3    lisinopril (PRINIVIL;ZESTRIL) 10 MG tablet Take 1 tablet by mouth 2 times daily 30 tablet 3    carvedilol (COREG) 25 MG tablet Take 1 tablet by mouth 2 times daily 60 tablet 3    spironolactone (ALDACTONE) 25 MG tablet Take 1 tablet by mouth daily 30 tablet 3        Medications patient taking as of now reconciled against medications ordered at time of hospital discharge: Yes    Chief Complaint   Patient presents with    Hypertension    Diabetes       History of Present illness - Follow up of Hospital diagnosis(es): here in follow up from recent hospital stay for severe anemia. Did get blood transfusion but feeling much better overall. Cough is much better.  -not taking antibiotic given last time. Inpatient course: Discharge summary reviewed- see chart. Interval history/Current status: stable     A comprehensive review of systems was negative. Vitals:    03/02/20 1117   BP: 138/60   Pulse: 80   Resp: 16   Temp: 99 °F (37.2 °C)   SpO2: 98%   Weight: 133 lb (60.3 kg)   Height: 4' 11\" (1.499 m)     Body mass index is 26.86 kg/m².    Wt Readings from Last 3 Encounters:   03/02/20 133 lb (60.3 kg)   02/26/20 133 lb 13.1 oz (60.7 kg)   11/11/19 135 lb 12.8 oz (61.6 kg)     BP Readings from Last 3 Encounters:   03/02/20 138/60   02/26/20 (!) 138/40   02/25/20 (!) 142/65    Reviewed allergy, medical, social, surgical, family and med list changes and updated   files  Social History     Socioeconomic History    Marital status:      Spouse name: Not on file    Number of children: Not on file    Years of education: Not on file    Highest education level: Not on file   Occupational History    Occupation: Retired-   Social Needs    Financial resource strain: Not on file    Food insecurity:     Worry: Not on file     Inability: Not on file    Transportation needs:     Medical: Not on file     Non-medical: Not on file   Tobacco Use    Smoking status: Former Smoker     Packs/day: 1.00     Years: 59.00     Pack years: 59.00     Types: Cigarettes     Start date: 11/30/2017    Smokeless tobacco: Never Used    Tobacco comment: quit 2-3 weeks ago    Substance and Sexual Activity  Alcohol use: Not Currently    Drug use: No    Sexual activity: Yes   Lifestyle    Physical activity:     Days per week: Not on file     Minutes per session: Not on file    Stress: Not on file   Relationships    Social connections:     Talks on phone: Not on file     Gets together: Not on file     Attends Sikhism service: Not on file     Active member of club or organization: Not on file     Attends meetings of clubs or organizations: Not on file     Relationship status: Not on file    Intimate partner violence:     Fear of current or ex partner: Not on file     Emotionally abused: Not on file     Physically abused: Not on file     Forced sexual activity: Not on file   Other Topics Concern    Not on file   Social History Narrative    Not on file     Current Outpatient Medications   Medication Sig Dispense Refill    azithromycin (ZITHROMAX) 250 MG tablet Take 1 tablet by mouth See Admin Instructions for 5 days 500mg on day 1 followed by 250mg on days 2 - 5 6 tablet 0    hydrALAZINE (APRESOLINE) 100 MG tablet Take 100 mg by mouth 2 times daily      pantoprazole (PROTONIX) 40 MG tablet Take 40mg twice daily (1st dose 30min before breakfast) for 30 days.  After 30 days, you make take 40mg once daily before breakfast. 60 tablet 3    furosemide (LASIX) 40 MG tablet Take 1 tablet by mouth daily 90 tablet 0    rosuvastatin (CRESTOR) 40 MG tablet Take 1 tablet by mouth every evening 90 tablet 0    PARoxetine (PAXIL) 40 MG tablet TAKE 1 TABLET BY MOUTH ONCE DAILY IN THE MORNING 30 tablet 0    dicyclomine (BENTYL) 10 MG capsule Take 1 capsule by mouth 4 times daily as needed (abdominal pain) 120 capsule 3    ondansetron (ZOFRAN) 4 MG tablet Take 1 tablet by mouth every 8 hours as needed for Nausea or Vomiting 30 tablet 2    ACCU-CHEK PHYLLIS PLUS strip Test 3x daily Dx E11.65 100 each 3    insulin 70-30 (NOVOLIN 70/30) (70-30) 100 UNIT per ML injection vial 20 units twice a day if glucose more than 120 1 follow-up     2. Iron deficiency     3.  Pneumonia of both lower lobes due to infectious organism [facility])          Medical Decision Making: moderate complexity    Continue current medications   Orders Placed This Encounter   Medications    azithromycin (ZITHROMAX) 250 MG tablet     Sig: Take 1 tablet by mouth See Admin Instructions for 5 days 500mg on day 1 followed by 250mg on days 2 - 5     Dispense:  6 tablet     Refill:  0   f/u with gi as already planned   Chest xray in 3 weeks and f/u after done

## 2020-03-02 NOTE — PROGRESS NOTES
02/24/2020     Lab Results   Component Value Date    CHOL 105 02/24/2020    TRIG 67 02/24/2020    HDL 42 02/24/2020    LDLCALC 50 02/24/2020        Objective:   Physical Exam    Assessment / Plan:

## 2020-03-04 ENCOUNTER — CARE COORDINATION (OUTPATIENT)
Dept: CASE MANAGEMENT | Age: 75
End: 2020-03-04

## 2020-03-04 NOTE — CARE COORDINATION
[facility] Transitions Follow Up Call    3/4/2020    Patient: Miko Riggs  Patient : 1945   MRN: <[id number]>  Reason for Admission:   Discharge Date: 20 RARS: Readmission Risk Score: 25         Spoke with: Attempted to contact patient for follow up Care Transition call. Left message on Voice Mail to call office (number given) with any questions and an update on patient's condition since discharge. Will Follow Up at a later time. Care Transitions Subsequent and Final Call    Subsequent and Final Calls  Care Transitions Interventions                          Other Interventions:             Follow Up  Future Appointments   Date Time Provider Zaid Lr   3/16/2020 11:15 AM Nick Goodell,  [facility]   3/25/2020 11:00 AM Alfreida Sacks, MD [address] [place] [address]

## 2020-03-11 ENCOUNTER — CARE COORDINATION (OUTPATIENT)
Dept: CASE MANAGEMENT | Age: 75
End: 2020-03-11

## 2020-03-12 ENCOUNTER — CARE COORDINATION (OUTPATIENT)
Dept: CARE COORDINATION | Age: 75
End: 2020-03-12

## 2020-03-12 NOTE — CARE COORDINATION
Telephone call with patient. She indicated that she is now independent with ADL's. No problems affording medications. She has transportation to upcoming medical appointment. No new needs or concerns were voiced at time of phone call. Will discharge from [address] [place] [address]  Provided patient with phone number for future reference as needed.

## 2020-03-12 NOTE — CARE COORDINATION
Returning voicemail from patient. Left voicemail of nature of call with request for return phone call. Call back number was provided.

## 2020-03-20 ENCOUNTER — OFFICE VISIT (OUTPATIENT)
Dept: OBGYN CLINIC | Age: 75
End: 2020-03-20
Payer: MEDICARE

## 2020-03-20 VITALS
BODY MASS INDEX: 28 KG/M2 | WEIGHT: 138.9 LBS | HEIGHT: 59 IN | DIASTOLIC BLOOD PRESSURE: 80 MMHG | SYSTOLIC BLOOD PRESSURE: 150 MMHG

## 2020-03-20 PROCEDURE — 99213 OFFICE O/P EST LOW 20 MIN: CPT | Performed by: OBSTETRICS & GYNECOLOGY

## 2020-03-20 PROCEDURE — 3017F COLORECTAL CA SCREEN DOC REV: CPT | Performed by: OBSTETRICS & GYNECOLOGY

## 2020-03-20 PROCEDURE — 1090F PRES/ABSN URINE INCON ASSESS: CPT | Performed by: OBSTETRICS & GYNECOLOGY

## 2020-03-20 PROCEDURE — G8417 CALC BMI ABV UP PARAM F/U: HCPCS | Performed by: OBSTETRICS & GYNECOLOGY

## 2020-03-20 PROCEDURE — 1111F DSCHRG MED/CURRENT MED MERGE: CPT | Performed by: OBSTETRICS & GYNECOLOGY

## 2020-03-20 PROCEDURE — G8427 DOCREV CUR MEDS BY ELIG CLIN: HCPCS | Performed by: OBSTETRICS & GYNECOLOGY

## 2020-03-20 PROCEDURE — G8400 PT W/DXA NO RESULTS DOC: HCPCS | Performed by: OBSTETRICS & GYNECOLOGY

## 2020-03-20 PROCEDURE — 4040F PNEUMOC VAC/ADMIN/RCVD: CPT | Performed by: OBSTETRICS & GYNECOLOGY

## 2020-03-20 PROCEDURE — 1123F ACP DISCUSS/DSCN MKR DOCD: CPT | Performed by: OBSTETRICS & GYNECOLOGY

## 2020-03-20 PROCEDURE — 1036F TOBACCO NON-USER: CPT | Performed by: OBSTETRICS & GYNECOLOGY

## 2020-03-20 PROCEDURE — G8482 FLU IMMUNIZE ORDER/ADMIN: HCPCS | Performed by: OBSTETRICS & GYNECOLOGY

## 2020-03-23 ENCOUNTER — VIRTUAL VISIT (OUTPATIENT)
Dept: ENDOCRINOLOGY | Age: 75
End: 2020-03-23
Payer: MEDICARE

## 2020-03-23 PROCEDURE — 99442 PR PHYS/QHP TELEPHONE EVALUATION 11-20 MIN: CPT | Performed by: INTERNAL MEDICINE

## 2020-03-23 NOTE — PROGRESS NOTES
Subjective:      Patient ID: Franck Olivares is a 75 y.o. female. 3-month follow-up on diabetes this is a telephone visit patient was made aware this is a billable visit blood sugars have been improving no hypoglycemia taking Novolin 70/30 20 units twice daily  Diabetes   She presents for her follow-up diabetic visit. She has type 2 diabetes mellitus. Symptoms are improving. Current diabetic treatment includes insulin injections (Novolin 70/30). She is currently taking insulin pre-breakfast and pre-dinner. Her overall blood glucose range is 140-180 mg/dl. (Lab Results       Component                Value               Date                       LABA1C                   6.9 (H)             02/25/2020              )       Results for Nima Crowley (MRN [id number]) as of 3/23/2020 11:06   Ref.  Range 2/25/2020 05:24   Sodium Latest Ref Range: 135 - 144 mEq/L 138   Potassium Latest Ref Range: 3.4 - 4.9 mEq/L 3.5   Chloride Latest Ref Range: 95 - 107 mEq/L 103   CO2 Latest Ref Range: 20 - 31 mEq/L 20   BUN Latest Ref Range: 8 - 23 mg/dL 11   Creatinine Latest Ref Range: 0.50 - 0.90 mg/dL 1.19 (H)   Anion Gap Latest Ref Range: 9 - 15 mEq/L 15   GFR Non- Latest Ref Range: >60  44.2 (L)   GFR  Latest Ref Range: >60  53.4 (L)   Glucose Latest Ref Range: 70 - 99 mg/dL 118 (H)   Calcium Latest Ref Range: 8.5 - 9.9 mg/dL 9.2   Hemoglobin A1C Latest Ref Range: 4.8 - 5.9 % 6.9 (H)     Patient Active Problem List   Diagnosis    Hypertension    Hyperlipidemia    Uncontrolled type 2 diabetes mellitus with complication, without long-term current use of insulin (HCC)    Fibromyalgia    Anxiety    Smoker    Insomnia    DJD (degenerative joint disease), lumbar    Lumbosacral radiculopathy at S1    DDD (degenerative disc disease), lumbar    Dysphonia    CTS (carpal tunnel syndrome)    High risk medication use-Norco - 12/20/17 OARRS PM&R, 02/20/18 OARRS PM&R, 03/07/18 [facility]AR[facility] PM&R, Urine Drug screen negative 02/06/17 PM&R--MED CONTRACT 2/6/17    SOB (shortness of breath) on exertion    Chest pain    Memory deficit    Artificial lens present    Presbyopia    Astigmatism, regular    Cataract, nuclear sclerotic senile    IDDM (insulin dependent diabetes mellitus) [facility]    Regular astigmatism    Nuclear senile cataract    Neck pain    Lumbosacral radiculopathy at L5    Spinal stenosis of lumbar region with neurogenic claudication    Impaired mobility and activities of daily living    Non-compliant patient NO showed FU 1/10/17 Dr Aletha Mireles Insomnia secondary to chronic pain    Reactive depression    Diabetic radiculopathy (HCC)    Cervical radicular pain    Diabetic asymmetric polyneuropathy ([facility])    Myalgia    Intercostal neuropathy    Osteoarthritis of spine with radiculopathy, lumbar region    Acute combined systolic and diastolic CHF, NYHA class 1 ([facility])    PMB (postmenopausal bleeding)    Acute on chronic diastolic heart failure (HCC)    CHF (congestive heart failure) ([facility])    Hypertensive urgency    Uncontrolled type 2 diabetes mellitus with hyperglycemia ([facility])    Chronic obstructive pulmonary disease with acute exacerbation ([facility])    Acute on chronic diastolic (congestive) heart failure ([facility])    SAÚL (acute kidney injury) ([facility]    Hypoglycemia    HOCM (hypertrophic obstructive cardiomyopathy) ([facility])    Gastrointestinal hemorrhage     Allergies   Allergen Reactions    Ibuprofen Nausea Only    Metformin And Related      Diarrhea      Darvon [Propoxyphene Hcl] Nausea And Vomiting       Current Outpatient Medications:     hydrALAZINE (APRESOLINE) 100 MG tablet, Take 100 mg by mouth 2 times daily, Disp: , Rfl:     pantoprazole (PROTONIX) 40 MG tablet, Take 40mg twice daily (1st dose 30min before breakfast) for 30 days.  After 30 days, you make take 40mg once daily before breakfast., Disp: 60 tablet, Rfl: 3    furosemide (LASIX) 40 MG tablet, Take 1 tablet by mouth daily, Disp: 90 tablet, Rfl: 0    rosuvastatin (CRESTOR) 40 MG tablet, Take 1 tablet by mouth every evening, Disp: 90 tablet, Rfl: 0    PARoxetine (PAXIL) 40 MG tablet, TAKE 1 TABLET BY MOUTH ONCE DAILY IN THE MORNING, Disp: 30 tablet, Rfl: 0    dicyclomine (BENTYL) 10 MG capsule, Take 1 capsule by mouth 4 times daily as needed (abdominal pain), Disp: 120 capsule, Rfl: 3    ondansetron (ZOFRAN) 4 MG tablet, Take 1 tablet by mouth every 8 hours as needed for Nausea or Vomiting, Disp: 30 tablet, Rfl: 2    ACCU-CHEK PHYLLIS PLUS strip, Test 3x daily Dx E11.65, Disp: 100 each, Rfl: 3    insulin 70-30 (NOVOLIN 70/30) (70-30) 100 UNIT per ML injection vial, 20 units twice a day if glucose more than 120, Disp: 1 vial, Rfl: 3    Insulin Syringe-Needle U-100 (INSULIN SYRINGE 1CC/31GX5/16\") 31G X 5/16\" 1 ML MISC, 1 each by Does not apply route daily, Disp: 50 each, Rfl: 3    digoxin (LANOXIN) 125 MCG tablet, Take 1 tablet by mouth daily, Disp: 30 tablet, Rfl: 3    lisinopril (PRINIVIL;ZESTRIL) 10 MG tablet, Take 1 tablet by mouth 2 times daily, Disp: 30 tablet, Rfl: 3    carvedilol (COREG) 25 MG tablet, Take 1 tablet by mouth 2 times daily, Disp: 60 tablet, Rfl: 3    spironolactone (ALDACTONE) 25 MG tablet, Take 1 tablet by mouth daily, Disp: 30 tablet, Rfl: 3      Review of Systems    Objective:   Physical Exam    Assessment:       Diagnosis Orders   1.  Uncontrolled type 2 diabetes mellitus with complication, without long-term current use of insulin (HCC)             Plan:      Continue Novolin 70/30 20 units twice daily follow-up in 3 months total time spent was 15 minutes  Orders Placed This Encounter   Procedures    Basic Metabolic Panel     Standing Status:   Future     Standing Expiration Date:   3/23/2021    Hemoglobin A1C     Standing Status:   Future     Standing Expiration Date:   3/23/2021             Ramon Monae MD

## 2020-03-27 ENCOUNTER — VIRTUAL VISIT (OUTPATIENT)
Dept: CARDIOLOGY CLINIC | Age: 75
End: 2020-03-27
Payer: MEDICARE

## 2020-03-27 PROCEDURE — 1111F DSCHRG MED/CURRENT MED MERGE: CPT | Performed by: INTERNAL MEDICINE

## 2020-03-27 PROCEDURE — G8417 CALC BMI ABV UP PARAM F/U: HCPCS | Performed by: INTERNAL MEDICINE

## 2020-03-27 PROCEDURE — G8428 CUR MEDS NOT DOCUMENT: HCPCS | Performed by: INTERNAL MEDICINE

## 2020-03-27 PROCEDURE — 1036F TOBACCO NON-USER: CPT | Performed by: INTERNAL MEDICINE

## 2020-03-27 PROCEDURE — G8400 PT W/DXA NO RESULTS DOC: HCPCS | Performed by: INTERNAL MEDICINE

## 2020-03-27 PROCEDURE — 1123F ACP DISCUSS/DSCN MKR DOCD: CPT | Performed by: INTERNAL MEDICINE

## 2020-03-27 PROCEDURE — 99442 PR PHYS/QHP TELEPHONE EVALUATION 11-20 MIN: CPT | Performed by: INTERNAL MEDICINE

## 2020-03-27 PROCEDURE — 3017F COLORECTAL CA SCREEN DOC REV: CPT | Performed by: INTERNAL MEDICINE

## 2020-03-27 PROCEDURE — 1090F PRES/ABSN URINE INCON ASSESS: CPT | Performed by: INTERNAL MEDICINE

## 2020-03-27 PROCEDURE — 4040F PNEUMOC VAC/ADMIN/RCVD: CPT | Performed by: INTERNAL MEDICINE

## 2020-03-27 PROCEDURE — G8482 FLU IMMUNIZE ORDER/ADMIN: HCPCS | Performed by: INTERNAL MEDICINE

## 2020-03-27 ASSESSMENT — ENCOUNTER SYMPTOMS
SHORTNESS OF BREATH: 0
STRIDOR: 0
CHEST TIGHTNESS: 0
RESPIRATORY NEGATIVE: 1
BLOOD IN STOOL: 0
WHEEZING: 0
COUGH: 0
GASTROINTESTINAL NEGATIVE: 1
EYES NEGATIVE: 1
NAUSEA: 0

## 2020-03-27 NOTE — PROGRESS NOTES
Subsequent Progress Note  Patient: Trinity Black  YOB: 1945  MRN: [id number]    Chief Complaint: htn HF SOB DM HOCM  No chief complaint on file. CV Data:  3/2019 Echo EF 79% no KYLE   10/8/2019 Cath CX 20-30 LVEF 95%     Subjective/HPI: she stopped Verapamil 3 days ago due to severe fatigue and weakness. Feels better now. No cp no sob no bleed. C/o nocturnal leg cramps    3/27/2020 Patient and/or health care decision maker is aware that that he may receive a bill for this telephone service, depending on his insurance coverage, and has provided verbal consent to proceed. This visit was completed via telephone. Time spent on the phone with patient 18 minutes. She was to f/u with Dr. Edie Chow but was on my VV schedule today. No CP no SOB. Still has occasional night time leg cramps. She is walking and compliant with meds. No LE edema. EKG:    Past Medical History:   Diagnosis Date    Anxiety     Asthma     dx 2019 / has smoked since age 14    CHF (congestive heart failure) (HCC)     Chronic back pain     Bilateral L5 S1 Radic on emg--surprisingly worse on the left than the right--pt's symptoms and her MRI show worse on the right    Chronic obstructive pulmonary disease with acute exacerbation ([address] 10/12/2019    Depression     Fibromyalgia     Hyperlipidemia     meds > 8 yrs    Hypertension     meds > 45 yrs    Osteoarthritis     Type II diabetes mellitus, uncontrolled ([address]     hx > 8 yrs    Unspecified sleep apnea        Past Surgical History:   Procedure Laterality Date    BACK SURGERY  2017    lumbar disc    CARDIAC CATHETERIZATION  11/3/14     DR. MIRELES / no stents    COLONOSCOPY  08/29/2016    w/polypectomy     DILATION AND CURETTAGE OF UTERUS N/A 10/7/2019    EUA HYSTEROSCOPY DILATATION AND CURETTAGE performed by Ubaldo Leavitt DO at [facility] ENDOSCOPY, COLON, DIAGNOSTIC      EYE SURGERY      Phaco with IOL OU / [facility] Not on file     Gets together: Not on file     Attends Tenriism service: Not on file     Active member of club or organization: Not on file     Attends meetings of clubs or organizations: Not on file     Relationship status: Not on file    Intimate partner violence     Fear of current or ex partner: Not on file     Emotionally abused: Not on file     Physically abused: Not on file     Forced sexual activity: Not on file   Other Topics Concern    Not on file   Social History Narrative    Not on file       Allergies   Allergen Reactions    Ibuprofen Nausea Only    Metformin And Related      Diarrhea      Darvon [Propoxyphene Hcl] Nausea And Vomiting       Current Outpatient Medications   Medication Sig Dispense Refill    hydrALAZINE (APRESOLINE) 100 MG tablet Take 100 mg by mouth 2 times daily      pantoprazole (PROTONIX) 40 MG tablet Take 40mg twice daily (1st dose 30min before breakfast) for 30 days.  After 30 days, you make take 40mg once daily before breakfast. 60 tablet 3    furosemide (LASIX) 40 MG tablet Take 1 tablet by mouth daily 90 tablet 0    rosuvastatin (CRESTOR) 40 MG tablet Take 1 tablet by mouth every evening 90 tablet 0    PARoxetine (PAXIL) 40 MG tablet TAKE 1 TABLET BY MOUTH ONCE DAILY IN THE MORNING 30 tablet 0    dicyclomine (BENTYL) 10 MG capsule Take 1 capsule by mouth 4 times daily as needed (abdominal pain) 120 capsule 3    ondansetron (ZOFRAN) 4 MG tablet Take 1 tablet by mouth every 8 hours as needed for Nausea or Vomiting 30 tablet 2    ACCU-CHEK PHYLLIS PLUS strip Test 3x daily Dx E11.65 100 each 3    insulin 70-30 (NOVOLIN 70/30) (70-30) 100 UNIT per ML injection vial 20 units twice a day if glucose more than 120 1 vial 3    Insulin Syringe-Needle U-100 (INSULIN SYRINGE 1CC/31GX5/16\") 31G X 5/16\" 1 ML MISC 1 each by Does not apply route daily 50 each 3    digoxin (LANOXIN) 125 MCG tablet Take 1 tablet by mouth daily 30 tablet 3    lisinopril (PRINIVIL;ZESTRIL) 10 MG

## 2020-03-30 ENCOUNTER — VIRTUAL VISIT (OUTPATIENT)
Dept: GASTROENTEROLOGY | Age: 75
End: 2020-03-30
Payer: MEDICARE

## 2020-03-30 PROCEDURE — 99442 PR PHYS/QHP TELEPHONE EVALUATION 11-20 MIN: CPT | Performed by: SPECIALIST

## 2020-03-30 ASSESSMENT — ENCOUNTER SYMPTOMS
VOMITING: 0
ABDOMINAL DISTENTION: 0
EYES NEGATIVE: 1
RECTAL PAIN: 0
ANAL BLEEDING: 0
ABDOMINAL PAIN: 0
CONSTIPATION: 0
SHORTNESS OF BREATH: 1
DIARRHEA: 0
NAUSEA: 0
BLOOD IN STOOL: 0
GASTROINTESTINAL NEGATIVE: 1

## 2020-03-30 NOTE — PROGRESS NOTES
Gastroenterology Clinic Follow up Visit    Lucille Davidson  [id number]  Chief Complaint   Patient presents with    Follow-up     F/u from ED @ [facility] for melena and anemia. Experiencing dark stools. Constipation. Using OTC stool softeners and laxatives and states it does not work. HPI and A/P at last visit summarized below: This was a telephone encounter with the patient. Patient was informed that she will be billed for this service. Patient was admitted to the hospital with anemia and passing dark stool. EGD showed antral erosions and 2 small nonbleeding AVM in the duodenum. Antral biopsies showed some gastritis with no HP, not discharged home with the plan of doing an outpatient colonoscopy. Patient reports passing dark stool however it is only a once a day small amount of dark stool, no nausea no vomiting. No significant change in bowel habits, few years ago patient had a colonoscopy by Dr. Brook Molina which showed benign colon polyps, reports vague discomfort in the right side of the abdomen. No weight loss. Her hemoglobin and hematocrit on February 26 was 7.9 and 24.4. Duration of the phone call was  12 minutes  Review of Systems   Constitutional: Negative. HENT: Negative. Eyes: Negative. Respiratory: Positive for shortness of breath. Cardiovascular: Negative. Gastrointestinal: Negative. Negative for abdominal distention, abdominal pain, anal bleeding, blood in stool, constipation, diarrhea, nausea, rectal pain and vomiting. Dark stool   Endocrine: Negative. Genitourinary: Negative. Musculoskeletal: Negative. Skin: Negative. Allergic/Immunologic: Negative for food allergies. Neurological: Negative. Hematological: Negative. Psychiatric/Behavioral: Negative. Past medical history, past surgical history, medication list, social and familyhistory reviewed    Last menstrual period 09/30/1995, not currently breastfeeding.     Physical Exam    Laboratory,

## 2020-05-07 ENCOUNTER — TELEPHONE (OUTPATIENT)
Dept: CARDIOLOGY CLINIC | Age: 75
End: 2020-05-07

## 2020-05-07 NOTE — TELEPHONE ENCOUNTER
Patient's  calling to state that this morning Aimee work up dizzy and lightheaded. Her BP was 138/53 and HR:80. She is also complaining of feeling chills (no fever temp was 98.6), 'heavy' in her limbs and having trouble speaking. He is noting her blood sugar was 330. Advised ER but she is hesitant to go. He states he will make sure she gets plenty of fluids as she has not been drinking and he is worried about her being dehydrated. Advised ER again due to symptoms.

## 2020-05-11 RX ORDER — CARVEDILOL 25 MG/1
25 TABLET ORAL 2 TIMES DAILY
Qty: 180 TABLET | Refills: 1 | Status: ON HOLD | OUTPATIENT
Start: 2020-05-11 | End: 2020-07-03 | Stop reason: HOSPADM

## 2020-05-11 RX ORDER — DIGOXIN 125 MCG
125 TABLET ORAL DAILY
Qty: 90 TABLET | Refills: 1 | Status: ON HOLD | OUTPATIENT
Start: 2020-05-11 | End: 2020-10-20 | Stop reason: HOSPADM

## 2020-05-11 RX ORDER — PAROXETINE HYDROCHLORIDE 40 MG/1
TABLET, FILM COATED ORAL
Qty: 90 TABLET | Refills: 0 | Status: SHIPPED | OUTPATIENT
Start: 2020-05-11 | End: 2020-10-22 | Stop reason: SDUPTHER

## 2020-05-23 ENCOUNTER — VIRTUAL VISIT (OUTPATIENT)
Dept: ENDOCRINOLOGY | Age: 75
End: 2020-05-23
Payer: MEDICARE

## 2020-05-23 PROCEDURE — 99213 OFFICE O/P EST LOW 20 MIN: CPT | Performed by: INTERNAL MEDICINE

## 2020-05-23 RX ORDER — LANCETS
EACH MISCELLANEOUS
Qty: 100 EACH | Refills: 3 | Status: ON HOLD | OUTPATIENT
Start: 2020-05-23 | End: 2020-07-03 | Stop reason: HOSPADM

## 2020-05-23 RX ORDER — BLOOD SUGAR DIAGNOSTIC
STRIP MISCELLANEOUS
Qty: 100 EACH | Refills: 3 | Status: SHIPPED | OUTPATIENT
Start: 2020-05-23 | End: 2020-07-23 | Stop reason: SDUPTHER

## 2020-05-23 NOTE — PROGRESS NOTES
2020    TELEHEALTH EVALUATION -- Audio/Visual (During HEUFR-39 public health emergency)    Due to [place] [address] outbreak, patient's office visit was converted to a virtual visit. Patient was contacted and agreed to proceed with a virtual visit via Telephone Visit  The risks and benefits of converting to a virtual visit were discussed in light of the current infectious disease epidemic. Patient also understood that insurance coverage and co-pays are up to their individual insurance plans. HPI:patient made aware this is a telephone visit billable visit agreed to proceed  Shila Melgar (:  1945) has requested an audio/video evaluation for the following concern(s):    Type  2 diabetes on Novolin 70/30 10 units twice a day blood sugars are staying between 2-400 range patient is less active also is staying mostly home does  drink more Pepsi which is raising her sugars    Results for Manasa Palacios (MRN [id number]) as of 2020 09:37   Ref. Range 2018 15:14 2018 09:42 3/15/2019 11:08 10/8/2019 10:30 2020 05:24   Hemoglobin A1C Latest Ref Range: 4.8 - 5.9 % 8.5 12.2 (H) 14 8.6 (H) 6.9 (H)       Review of Systems    Prior to Visit Medications    Medication Sig Taking? Authorizing Provider   PARoxetine (PAXIL) 40 MG tablet TAKE 1 TABLET BY MOUTH ONCE DAILY IN THE MORNING  Alicia Sanchez MD   carvedilol (COREG) 25 MG tablet Take 1 tablet by mouth 2 times daily  Bienvenido Mancuso MD   digoxin (LANOXIN) 125 MCG tablet Take 1 tablet by mouth daily  Bienvenido Mancuso MD   insulin 70-30 (NOVOLIN 70/30) (70-30) 100 UNIT per ML injection vial 20 units twice a day if glucose more than 120  Lyle Scott MD   hydrALAZINE (APRESOLINE) 100 MG tablet Take 100 mg by mouth 2 times daily  Historical Provider, MD   pantoprazole (PROTONIX) 40 MG tablet Take 40mg twice daily (1st dose 30min before breakfast) for 30 days.  After 30 days, you make take 40mg once daily before breakfast.  Agatha Mercado, DO   furosemide (LASIX) 40 MG tablet Take 1 tablet by mouth daily  Getachew Ellis MD   rosuvastatin (CRESTOR) 40 MG tablet Take 1 tablet by mouth every evening  Getachew Ellis MD   dicyclomine (BENTYL) 10 MG capsule Take 1 capsule by mouth 4 times daily as needed (abdominal pain)  JOHNATHON Martinez CNP   ondansetron (ZOFRAN) 4 MG tablet Take 1 tablet by mouth every 8 hours as needed for Nausea or Vomiting  JOHNATHON Martinez CNP   ACCU-CHEK PHYLLIS PLUS strip Test 3x daily Dx E11.65  Ema Kaur MD   Insulin Syringe-Needle U-100 (INSULIN SYRINGE 1CC/31GX5/16\") 31G X 5/16\" 1 ML MISC 1 each by Does not apply route daily  Ema Kaur MD   lisinopril (PRINIVIL;ZESTRIL) 10 MG tablet Take 1 tablet by mouth 2 times daily  Kalyn Pichardo MD   spironolactone (ALDACTONE) 25 MG tablet Take 1 tablet by mouth daily  Kalyn Pichardo MD       Social History     Tobacco Use    Smoking status: Former Smoker     Packs/day: 1.00     Years: 59.00     Pack years: 59.00     Types: Cigarettes     Start date: 11/30/2017    Smokeless tobacco: Never Used    Tobacco comment: quit 2-3 weeks ago    Substance Use Topics    Alcohol use: Not Currently    Drug use:  No            PHYSICAL EXAMINATION:  [ INSTRUCTIONS:  \"[x]\" Indicates a positive item  \"[]\" Indicates a negative item  -- DELETE ALL ITEMS NOT EXAMINED]  [] Alert  [] Oriented to person/place/time    [] No apparent distress  [] Toxic appearing    [] Face flushed appearing [] Sclera clear  [] Lips are cyanotic      [] Breathing appears normal  [] Appears tachypneic      [] Rash on visible skin    [] Cranial Nerves II-XII grossly intact    [] Motor grossly intact in visible upper extremities    [] Motor grossly intact in visible lower extremities    [] Normal Mood  [] Anxious appearing    [] Depressed appearing  [] Confused appearing      [] Poor short term memory  [] Poor long term memory    [] OTHER:      Due to this being a TeleHealth encounter, evaluation of the

## 2020-05-27 ENCOUNTER — TELEPHONE (OUTPATIENT)
Dept: FAMILY MEDICINE CLINIC | Age: 75
End: 2020-05-27

## 2020-06-12 ENCOUNTER — TELEPHONE (OUTPATIENT)
Dept: FAMILY MEDICINE CLINIC | Age: 75
End: 2020-06-12

## 2020-06-12 NOTE — TELEPHONE ENCOUNTER
Patient does not want to see Dr. Kennedy Hernandez any longer. It has been years and she doesn't feel like she is getting anywhere. Can she be referred to a different provider please.

## 2020-06-23 NOTE — PROGRESS NOTES
lvm reminding pt of mammogram and number to call to schedule appointment as well as our office number.

## 2020-06-29 ENCOUNTER — HOSPITAL ENCOUNTER (INPATIENT)
Age: 75
LOS: 4 days | Discharge: HOME | DRG: 291 | End: 2020-07-03
Attending: INTERNAL MEDICINE | Admitting: INTERNAL MEDICINE
Payer: MEDICARE

## 2020-06-29 ENCOUNTER — APPOINTMENT (OUTPATIENT)
Dept: GENERAL RADIOLOGY | Age: 75
DRG: 291 | End: 2020-06-29
Payer: MEDICARE

## 2020-06-29 PROBLEM — J44.1 COPD EXACERBATION (HCC): Status: ACTIVE | Noted: 2020-06-29

## 2020-06-29 LAB
ABO/RH: NORMAL
ALBUMIN SERPL-MCNC: 4.1 G/DL (ref 3.5–4.6)
ALP BLD-CCNC: 105 U/L (ref 40–130)
ALT SERPL-CCNC: 22 U/L (ref 0–33)
ANION GAP SERPL CALCULATED.3IONS-SCNC: 9 MEQ/L (ref 9–15)
ANISOCYTOSIS: ABNORMAL
ANTIBODY SCREEN: NORMAL
APTT: <20 SEC (ref 24.4–36.8)
AST SERPL-CCNC: 44 U/L (ref 0–35)
BASE EXCESS ARTERIAL: -3 (ref -3–3)
BASOPHILS ABSOLUTE: 0.1 K/UL (ref 0–0.2)
BASOPHILS RELATIVE PERCENT: 1 %
BILIRUB SERPL-MCNC: 0.3 MG/DL (ref 0.2–0.7)
BLOOD BANK DISPENSE STATUS: NORMAL
BLOOD BANK PRODUCT CODE: NORMAL
BPU ID: NORMAL
BUN BLDV-MCNC: 23 MG/DL (ref 8–23)
CALCIUM IONIZED: 1.3 MMOL/L (ref 1.12–1.32)
CALCIUM SERPL-MCNC: 9.8 MG/DL (ref 8.5–9.9)
CHLORIDE BLD-SCNC: 110 MEQ/L (ref 95–107)
CK MB: 8.1 NG/ML (ref 0–3.8)
CO2: 21 MEQ/L (ref 20–31)
CREAT SERPL-MCNC: 1.12 MG/DL (ref 0.5–0.9)
CREATINE KINASE-MB INDEX: 2.9 % (ref 0–3.5)
DESCRIPTION BLOOD BANK: NORMAL
DIGOXIN LEVEL: <0.3 NG/ML (ref 0.8–2)
EKG ATRIAL RATE: 87 BPM
EKG P AXIS: 53 DEGREES
EKG P-R INTERVAL: 124 MS
EKG Q-T INTERVAL: 362 MS
EKG QRS DURATION: 90 MS
EKG QTC CALCULATION (BAZETT): 435 MS
EKG R AXIS: 44 DEGREES
EKG T AXIS: 123 DEGREES
EKG VENTRICULAR RATE: 87 BPM
EOSINOPHILS ABSOLUTE: 0.4 K/UL (ref 0–0.7)
EOSINOPHILS RELATIVE PERCENT: 5 %
GFR AFRICAN AMERICAN: 57.2
GFR AFRICAN AMERICAN: 58
GFR NON-AFRICAN AMERICAN: 47.3
GFR NON-AFRICAN AMERICAN: 48
GLOBULIN: 2.6 G/DL (ref 2.3–3.5)
GLUCOSE BLD-MCNC: 134 MG/DL (ref 60–115)
GLUCOSE BLD-MCNC: 136 MG/DL (ref 70–99)
GLUCOSE BLD-MCNC: 148 MG/DL (ref 60–115)
GLUCOSE BLD-MCNC: 231 MG/DL (ref 60–115)
GLUCOSE BLD-MCNC: 434 MG/DL (ref 60–115)
HCO3 ARTERIAL: 21.8 MMOL/L (ref 21–29)
HCT VFR BLD CALC: 19.4 % (ref 37–47)
HCT VFR BLD CALC: 24.1 % (ref 37–47)
HEMOGLOBIN: 5.6 G/DL (ref 12–16)
HEMOGLOBIN: 6.3 GM/DL (ref 12–16)
HEMOGLOBIN: 7.3 G/DL (ref 12–16)
HYPOCHROMIA: ABNORMAL
INR BLD: 1
LACTATE: <0.3 MMOL/L (ref 0.4–2)
LACTIC ACID: 0.9 MMOL/L (ref 0.5–2.2)
LACTIC ACID: 4.9 MMOL/L (ref 0.5–2.2)
LYMPHOCYTES ABSOLUTE: 1.6 K/UL (ref 1–4.8)
LYMPHOCYTES RELATIVE PERCENT: 20 %
MACROCYTES: ABNORMAL
MAGNESIUM: 2.2 MG/DL (ref 1.7–2.4)
MCH RBC QN AUTO: 18.3 PG (ref 27–31.3)
MCHC RBC AUTO-ENTMCNC: 29.3 % (ref 33–37)
MCV RBC AUTO: 62.4 FL (ref 82–100)
MICROCYTES: ABNORMAL
MONOCYTES ABSOLUTE: 0.5 K/UL (ref 0.2–0.8)
MONOCYTES RELATIVE PERCENT: 5.7 %
NEUTROPHILS ABSOLUTE: 5.5 K/UL (ref 1.4–6.5)
NEUTROPHILS RELATIVE PERCENT: 69 %
NUCLEATED RED BLOOD CELLS: 1 /100 WBC
O2 SAT, ARTERIAL: 100 % (ref 93–100)
PCO2 ARTERIAL: 35 MM HG (ref 35–45)
PDW BLD-RTO: 20.4 % (ref 11.5–14.5)
PERFORMED ON: ABNORMAL
PH ARTERIAL: 7.4 (ref 7.35–7.45)
PLATELET # BLD: 499 K/UL (ref 130–400)
PLATELET SLIDE REVIEW: ABNORMAL
PO2 ARTERIAL: 259 MM HG (ref 75–108)
POC CHLORIDE: 112 MEQ/L (ref 99–110)
POC CREATININE: 1.1 MG/DL (ref 0.6–1.2)
POC HEMATOCRIT: 18 % (ref 36–48)
POC POTASSIUM: 4.3 MEQ/L (ref 3.5–5.1)
POC SAMPLE TYPE: ABNORMAL
POC SODIUM: 141 MEQ/L (ref 136–145)
POIKILOCYTES: ABNORMAL
POLYCHROMASIA: ABNORMAL
POTASSIUM SERPL-SCNC: 4.9 MEQ/L (ref 3.4–4.9)
PRO-BNP: 1587 PG/ML
PROTHROMBIN TIME: 13.6 SEC (ref 12.3–14.9)
RBC # BLD: 3.09 M/UL (ref 4.2–5.4)
SARS-COV-2, NAAT: NOT DETECTED
SCHISTOCYTES: ABNORMAL
SODIUM BLD-SCNC: 140 MEQ/L (ref 135–144)
TARGET CELLS: ABNORMAL
TCO2 ARTERIAL: 23 (ref 22–29)
TOTAL CK: 280 U/L (ref 0–170)
TOTAL PROTEIN: 6.7 G/DL (ref 6.3–8)
TROPONIN: 0.02 NG/ML (ref 0–0.01)
TROPONIN: <0.01 NG/ML (ref 0–0.01)
WBC # BLD: 8 K/UL (ref 4.8–10.8)

## 2020-06-29 PROCEDURE — 96365 THER/PROPH/DIAG IV INF INIT: CPT

## 2020-06-29 PROCEDURE — 93005 ELECTROCARDIOGRAM TRACING: CPT | Performed by: NURSE PRACTITIONER

## 2020-06-29 PROCEDURE — 86923 COMPATIBILITY TEST ELECTRIC: CPT

## 2020-06-29 PROCEDURE — 85014 HEMATOCRIT: CPT

## 2020-06-29 PROCEDURE — 6360000002 HC RX W HCPCS: Performed by: NURSE PRACTITIONER

## 2020-06-29 PROCEDURE — U0002 COVID-19 LAB TEST NON-CDC: HCPCS

## 2020-06-29 PROCEDURE — 6360000002 HC RX W HCPCS: Performed by: PHYSICIAN ASSISTANT

## 2020-06-29 PROCEDURE — C9113 INJ PANTOPRAZOLE SODIUM, VIA: HCPCS | Performed by: INTERNAL MEDICINE

## 2020-06-29 PROCEDURE — 94660 CPAP INITIATION&MGMT: CPT

## 2020-06-29 PROCEDURE — 6370000000 HC RX 637 (ALT 250 FOR IP): Performed by: INTERNAL MEDICINE

## 2020-06-29 PROCEDURE — 6360000002 HC RX W HCPCS: Performed by: INTERNAL MEDICINE

## 2020-06-29 PROCEDURE — 93010 ELECTROCARDIOGRAM REPORT: CPT | Performed by: INTERNAL MEDICINE

## 2020-06-29 PROCEDURE — 87040 BLOOD CULTURE FOR BACTERIA: CPT

## 2020-06-29 PROCEDURE — 71045 X-RAY EXAM CHEST 1 VIEW: CPT

## 2020-06-29 PROCEDURE — 36600 WITHDRAWAL OF ARTERIAL BLOOD: CPT

## 2020-06-29 PROCEDURE — 85018 HEMOGLOBIN: CPT

## 2020-06-29 PROCEDURE — 2700000000 HC OXYGEN THERAPY PER DAY

## 2020-06-29 PROCEDURE — 80053 COMPREHEN METABOLIC PANEL: CPT

## 2020-06-29 PROCEDURE — 82550 ASSAY OF CK (CPK): CPT

## 2020-06-29 PROCEDURE — 84132 ASSAY OF SERUM POTASSIUM: CPT

## 2020-06-29 PROCEDURE — 96375 TX/PRO/DX INJ NEW DRUG ADDON: CPT

## 2020-06-29 PROCEDURE — 6370000000 HC RX 637 (ALT 250 FOR IP): Performed by: PHYSICIAN ASSISTANT

## 2020-06-29 PROCEDURE — 99223 1ST HOSP IP/OBS HIGH 75: CPT | Performed by: INTERNAL MEDICINE

## 2020-06-29 PROCEDURE — 99285 EMERGENCY DEPT VISIT HI MDM: CPT

## 2020-06-29 PROCEDURE — 84484 ASSAY OF TROPONIN QUANT: CPT

## 2020-06-29 PROCEDURE — APPNB60 APP NON BILLABLE TIME 46-60 MINS: Performed by: NURSE PRACTITIONER

## 2020-06-29 PROCEDURE — 86900 BLOOD TYPING SEROLOGIC ABO: CPT

## 2020-06-29 PROCEDURE — 99221 1ST HOSP IP/OBS SF/LOW 40: CPT | Performed by: INTERNAL MEDICINE

## 2020-06-29 PROCEDURE — 84295 ASSAY OF SERUM SODIUM: CPT

## 2020-06-29 PROCEDURE — 99222 1ST HOSP IP/OBS MODERATE 55: CPT | Performed by: INTERNAL MEDICINE

## 2020-06-29 PROCEDURE — 6370000000 HC RX 637 (ALT 250 FOR IP): Performed by: NURSE PRACTITIONER

## 2020-06-29 PROCEDURE — 86901 BLOOD TYPING SEROLOGIC RH(D): CPT

## 2020-06-29 PROCEDURE — 2580000003 HC RX 258: Performed by: PHYSICIAN ASSISTANT

## 2020-06-29 PROCEDURE — 94664 DEMO&/EVAL PT USE INHALER: CPT

## 2020-06-29 PROCEDURE — 2060000000 HC ICU INTERMEDIATE R&B

## 2020-06-29 PROCEDURE — 83880 ASSAY OF NATRIURETIC PEPTIDE: CPT

## 2020-06-29 PROCEDURE — 80162 ASSAY OF DIGOXIN TOTAL: CPT

## 2020-06-29 PROCEDURE — 82435 ASSAY OF BLOOD CHLORIDE: CPT

## 2020-06-29 PROCEDURE — 82553 CREATINE MB FRACTION: CPT

## 2020-06-29 PROCEDURE — 36430 TRANSFUSION BLD/BLD COMPNT: CPT

## 2020-06-29 PROCEDURE — 82565 ASSAY OF CREATININE: CPT

## 2020-06-29 PROCEDURE — 85730 THROMBOPLASTIN TIME PARTIAL: CPT

## 2020-06-29 PROCEDURE — 85025 COMPLETE CBC W/AUTO DIFF WBC: CPT

## 2020-06-29 PROCEDURE — 83735 ASSAY OF MAGNESIUM: CPT

## 2020-06-29 PROCEDURE — 86850 RBC ANTIBODY SCREEN: CPT

## 2020-06-29 PROCEDURE — 82330 ASSAY OF CALCIUM: CPT

## 2020-06-29 PROCEDURE — 96368 THER/DIAG CONCURRENT INF: CPT

## 2020-06-29 PROCEDURE — 36415 COLL VENOUS BLD VENIPUNCTURE: CPT

## 2020-06-29 PROCEDURE — 85610 PROTHROMBIN TIME: CPT

## 2020-06-29 PROCEDURE — P9016 RBC LEUKOCYTES REDUCED: HCPCS

## 2020-06-29 PROCEDURE — 94761 N-INVAS EAR/PLS OXIMETRY MLT: CPT

## 2020-06-29 PROCEDURE — 96366 THER/PROPH/DIAG IV INF ADDON: CPT

## 2020-06-29 PROCEDURE — 94640 AIRWAY INHALATION TREATMENT: CPT

## 2020-06-29 PROCEDURE — 83605 ASSAY OF LACTIC ACID: CPT

## 2020-06-29 PROCEDURE — 82803 BLOOD GASES ANY COMBINATION: CPT

## 2020-06-29 RX ORDER — HYDRALAZINE HYDROCHLORIDE 100 MG/1
100 TABLET, FILM COATED ORAL 2 TIMES DAILY
Status: DISCONTINUED | OUTPATIENT
Start: 2020-06-29 | End: 2020-07-03 | Stop reason: HOSPADM

## 2020-06-29 RX ORDER — LEVOFLOXACIN 5 MG/ML
750 INJECTION, SOLUTION INTRAVENOUS EVERY 24 HOURS
Status: DISCONTINUED | OUTPATIENT
Start: 2020-06-30 | End: 2020-06-30

## 2020-06-29 RX ORDER — DEXTROSE MONOHYDRATE 50 MG/ML
100 INJECTION, SOLUTION INTRAVENOUS PRN
Status: DISCONTINUED | OUTPATIENT
Start: 2020-06-29 | End: 2020-07-03 | Stop reason: HOSPADM

## 2020-06-29 RX ORDER — NICOTINE 21 MG/24HR
1 PATCH, TRANSDERMAL 24 HOURS TRANSDERMAL DAILY
Status: DISCONTINUED | OUTPATIENT
Start: 2020-06-29 | End: 2020-07-03 | Stop reason: HOSPADM

## 2020-06-29 RX ORDER — IPRATROPIUM BROMIDE AND ALBUTEROL SULFATE 2.5; .5 MG/3ML; MG/3ML
1 SOLUTION RESPIRATORY (INHALATION) 3 TIMES DAILY
Status: DISCONTINUED | OUTPATIENT
Start: 2020-06-29 | End: 2020-06-29

## 2020-06-29 RX ORDER — ROSUVASTATIN CALCIUM 20 MG/1
40 TABLET, COATED ORAL EVERY EVENING
Status: DISCONTINUED | OUTPATIENT
Start: 2020-06-29 | End: 2020-07-03 | Stop reason: HOSPADM

## 2020-06-29 RX ORDER — CARVEDILOL 25 MG/1
25 TABLET ORAL 2 TIMES DAILY
Status: DISCONTINUED | OUTPATIENT
Start: 2020-06-29 | End: 2020-06-29

## 2020-06-29 RX ORDER — NICOTINE POLACRILEX 4 MG
15 LOZENGE BUCCAL PRN
Status: DISCONTINUED | OUTPATIENT
Start: 2020-06-29 | End: 2020-07-03 | Stop reason: HOSPADM

## 2020-06-29 RX ORDER — DEXTROSE MONOHYDRATE 25 G/50ML
12.5 INJECTION, SOLUTION INTRAVENOUS PRN
Status: DISCONTINUED | OUTPATIENT
Start: 2020-06-29 | End: 2020-07-03 | Stop reason: HOSPADM

## 2020-06-29 RX ORDER — 0.9 % SODIUM CHLORIDE 0.9 %
20 INTRAVENOUS SOLUTION INTRAVENOUS ONCE
Status: DISCONTINUED | OUTPATIENT
Start: 2020-06-29 | End: 2020-07-03 | Stop reason: HOSPADM

## 2020-06-29 RX ORDER — DIGOXIN 125 MCG
125 TABLET ORAL DAILY
Status: DISCONTINUED | OUTPATIENT
Start: 2020-06-29 | End: 2020-07-03 | Stop reason: HOSPADM

## 2020-06-29 RX ORDER — SODIUM CHLORIDE 9 MG/ML
10 INJECTION INTRAVENOUS DAILY
Status: DISCONTINUED | OUTPATIENT
Start: 2020-06-29 | End: 2020-07-03 | Stop reason: HOSPADM

## 2020-06-29 RX ORDER — 0.9 % SODIUM CHLORIDE 0.9 %
20 INTRAVENOUS SOLUTION INTRAVENOUS ONCE
Status: COMPLETED | OUTPATIENT
Start: 2020-06-29 | End: 2020-06-30

## 2020-06-29 RX ORDER — IPRATROPIUM BROMIDE AND ALBUTEROL SULFATE 2.5; .5 MG/3ML; MG/3ML
1 SOLUTION RESPIRATORY (INHALATION)
Status: DISCONTINUED | OUTPATIENT
Start: 2020-06-29 | End: 2020-06-29

## 2020-06-29 RX ORDER — PANTOPRAZOLE SODIUM 40 MG/10ML
40 INJECTION, POWDER, LYOPHILIZED, FOR SOLUTION INTRAVENOUS 2 TIMES DAILY
Status: DISCONTINUED | OUTPATIENT
Start: 2020-06-29 | End: 2020-07-03 | Stop reason: HOSPADM

## 2020-06-29 RX ORDER — SODIUM CHLORIDE 0.9 % (FLUSH) 0.9 %
10 SYRINGE (ML) INJECTION EVERY 12 HOURS SCHEDULED
Status: DISCONTINUED | OUTPATIENT
Start: 2020-06-29 | End: 2020-07-03 | Stop reason: HOSPADM

## 2020-06-29 RX ORDER — PAROXETINE HYDROCHLORIDE 20 MG/1
40 TABLET, FILM COATED ORAL DAILY
Status: DISCONTINUED | OUTPATIENT
Start: 2020-06-29 | End: 2020-07-03 | Stop reason: HOSPADM

## 2020-06-29 RX ORDER — POLYETHYLENE GLYCOL 3350 17 G/17G
17 POWDER, FOR SOLUTION ORAL DAILY PRN
Status: DISCONTINUED | OUTPATIENT
Start: 2020-06-29 | End: 2020-07-03 | Stop reason: HOSPADM

## 2020-06-29 RX ORDER — MAGNESIUM SULFATE IN WATER 40 MG/ML
4 INJECTION, SOLUTION INTRAVENOUS ONCE
Status: COMPLETED | OUTPATIENT
Start: 2020-06-29 | End: 2020-06-29

## 2020-06-29 RX ORDER — ACETAMINOPHEN 325 MG/1
650 TABLET ORAL EVERY 6 HOURS PRN
Status: DISCONTINUED | OUTPATIENT
Start: 2020-06-29 | End: 2020-07-03 | Stop reason: HOSPADM

## 2020-06-29 RX ORDER — ACETAMINOPHEN 650 MG/1
650 SUPPOSITORY RECTAL EVERY 6 HOURS PRN
Status: DISCONTINUED | OUTPATIENT
Start: 2020-06-29 | End: 2020-07-03 | Stop reason: HOSPADM

## 2020-06-29 RX ORDER — LEVOFLOXACIN 5 MG/ML
750 INJECTION, SOLUTION INTRAVENOUS ONCE
Status: COMPLETED | OUTPATIENT
Start: 2020-06-29 | End: 2020-06-29

## 2020-06-29 RX ORDER — PROMETHAZINE HYDROCHLORIDE 12.5 MG/1
12.5 TABLET ORAL EVERY 6 HOURS PRN
Status: DISCONTINUED | OUTPATIENT
Start: 2020-06-29 | End: 2020-07-03 | Stop reason: HOSPADM

## 2020-06-29 RX ORDER — IPRATROPIUM BROMIDE AND ALBUTEROL SULFATE 2.5; .5 MG/3ML; MG/3ML
1 SOLUTION RESPIRATORY (INHALATION) PRN
Status: DISCONTINUED | OUTPATIENT
Start: 2020-06-29 | End: 2020-06-29

## 2020-06-29 RX ORDER — PREDNISONE 10 MG/1
40 TABLET ORAL DAILY
Status: DISCONTINUED | OUTPATIENT
Start: 2020-07-01 | End: 2020-07-02

## 2020-06-29 RX ORDER — SODIUM CHLORIDE 0.9 % (FLUSH) 0.9 %
10 SYRINGE (ML) INJECTION PRN
Status: DISCONTINUED | OUTPATIENT
Start: 2020-06-29 | End: 2020-07-03 | Stop reason: HOSPADM

## 2020-06-29 RX ORDER — FUROSEMIDE 10 MG/ML
40 INJECTION INTRAMUSCULAR; INTRAVENOUS 2 TIMES DAILY
Status: DISCONTINUED | OUTPATIENT
Start: 2020-06-29 | End: 2020-07-02

## 2020-06-29 RX ORDER — ONDANSETRON 2 MG/ML
4 INJECTION INTRAMUSCULAR; INTRAVENOUS EVERY 6 HOURS PRN
Status: DISCONTINUED | OUTPATIENT
Start: 2020-06-29 | End: 2020-07-03 | Stop reason: HOSPADM

## 2020-06-29 RX ORDER — METHYLPREDNISOLONE SODIUM SUCCINATE 125 MG/2ML
125 INJECTION, POWDER, LYOPHILIZED, FOR SOLUTION INTRAMUSCULAR; INTRAVENOUS ONCE
Status: COMPLETED | OUTPATIENT
Start: 2020-06-29 | End: 2020-06-29

## 2020-06-29 RX ORDER — FUROSEMIDE 40 MG/1
40 TABLET ORAL DAILY
Status: DISCONTINUED | OUTPATIENT
Start: 2020-06-29 | End: 2020-06-29

## 2020-06-29 RX ORDER — METHYLPREDNISOLONE SODIUM SUCCINATE 40 MG/ML
40 INJECTION, POWDER, LYOPHILIZED, FOR SOLUTION INTRAMUSCULAR; INTRAVENOUS EVERY 6 HOURS
Status: DISCONTINUED | OUTPATIENT
Start: 2020-06-29 | End: 2020-07-01

## 2020-06-29 RX ADMIN — FUROSEMIDE 40 MG: 10 INJECTION, SOLUTION INTRAMUSCULAR; INTRAVENOUS at 23:20

## 2020-06-29 RX ADMIN — CARVEDILOL 37.5 MG: 25 TABLET, FILM COATED ORAL at 23:20

## 2020-06-29 RX ADMIN — ROSUVASTATIN CALCIUM 40 MG: 20 TABLET, FILM COATED ORAL at 23:21

## 2020-06-29 RX ADMIN — IPRATROPIUM BROMIDE AND ALBUTEROL 1 PUFF: 20; 100 SPRAY, METERED RESPIRATORY (INHALATION) at 15:40

## 2020-06-29 RX ADMIN — HYDRALAZINE HYDROCHLORIDE 100 MG: 100 TABLET, FILM COATED ORAL at 14:18

## 2020-06-29 RX ADMIN — PAROXETINE HYDROCHLORIDE 40 MG: 20 TABLET, FILM COATED ORAL at 14:17

## 2020-06-29 RX ADMIN — Medication 10 ML: at 23:22

## 2020-06-29 RX ADMIN — FUROSEMIDE 40 MG: 40 TABLET ORAL at 14:24

## 2020-06-29 RX ADMIN — METHYLPREDNISOLONE SODIUM SUCCINATE 40 MG: 40 INJECTION, POWDER, FOR SOLUTION INTRAMUSCULAR; INTRAVENOUS at 23:20

## 2020-06-29 RX ADMIN — INSULIN LISPRO 1 UNITS: 100 INJECTION, SOLUTION INTRAVENOUS; SUBCUTANEOUS at 22:40

## 2020-06-29 RX ADMIN — IPRATROPIUM BROMIDE AND ALBUTEROL 1 PUFF: 20; 100 SPRAY, METERED RESPIRATORY (INHALATION) at 19:44

## 2020-06-29 RX ADMIN — METHYLPREDNISOLONE SODIUM SUCCINATE 40 MG: 40 INJECTION, POWDER, FOR SOLUTION INTRAMUSCULAR; INTRAVENOUS at 14:18

## 2020-06-29 RX ADMIN — LEVOFLOXACIN 750 MG: 5 INJECTION, SOLUTION INTRAVENOUS at 09:55

## 2020-06-29 RX ADMIN — INSULIN LISPRO 20 UNITS: 100 INJECTION, SUSPENSION SUBCUTANEOUS at 17:05

## 2020-06-29 RX ADMIN — CARVEDILOL 25 MG: 25 TABLET, FILM COATED ORAL at 14:17

## 2020-06-29 RX ADMIN — MAGNESIUM SULFATE HEPTAHYDRATE 4 G: 40 INJECTION, SOLUTION INTRAVENOUS at 07:13

## 2020-06-29 RX ADMIN — PANTOPRAZOLE SODIUM 40 MG: 40 INJECTION, POWDER, FOR SOLUTION INTRAVENOUS at 23:20

## 2020-06-29 RX ADMIN — IPRATROPIUM BROMIDE AND ALBUTEROL SULFATE 1 AMPULE: .5; 3 SOLUTION RESPIRATORY (INHALATION) at 07:15

## 2020-06-29 RX ADMIN — HYDRALAZINE HYDROCHLORIDE 100 MG: 100 TABLET, FILM COATED ORAL at 23:21

## 2020-06-29 RX ADMIN — PANTOPRAZOLE SODIUM 40 MG: 40 INJECTION, POWDER, FOR SOLUTION INTRAVENOUS at 14:18

## 2020-06-29 RX ADMIN — ALBUTEROL SULFATE 5 MG: 2.5 SOLUTION RESPIRATORY (INHALATION) at 07:15

## 2020-06-29 RX ADMIN — IPRATROPIUM BROMIDE AND ALBUTEROL SULFATE 1 AMPULE: .5; 3 SOLUTION RESPIRATORY (INHALATION) at 11:21

## 2020-06-29 RX ADMIN — METHYLPREDNISOLONE SODIUM SUCCINATE 125 MG: 125 INJECTION, POWDER, FOR SOLUTION INTRAMUSCULAR; INTRAVENOUS at 07:13

## 2020-06-29 RX ADMIN — DIGOXIN 125 MCG: 125 TABLET ORAL at 14:24

## 2020-06-29 ASSESSMENT — ENCOUNTER SYMPTOMS
WHEEZING: 0
SORE THROAT: 0
EYES NEGATIVE: 1
RHINORRHEA: 0
CHEST TIGHTNESS: 0
GASTROINTESTINAL NEGATIVE: 1
COUGH: 0
BLOOD IN STOOL: 0
ABDOMINAL PAIN: 0
SHORTNESS OF BREATH: 1
NAUSEA: 0
DIARRHEA: 0
EYE PAIN: 0
BACK PAIN: 0
STRIDOR: 0
VOMITING: 0
PHOTOPHOBIA: 0

## 2020-06-29 NOTE — PROGRESS NOTES
[facility] [place] Respiratory Therapy Evaluation   Current Order: Q4 duoneb wa     Home Regimen: no     Ordering Physician:Osmany  Re-evaluation Date:  7/2    Diagnosis: COPD exacerbation     Patient Status: Stable / Unstable + Physician notified    The following MDI Criteria must be met in order to convert aerosol to MDI with spacer. If unable to meet, MDI will be converted to aerosol:  []  Patient able to demonstrate the ability to use MDI effectively  []  Patient alert and cooperative  []  Patient able to take deep breath with 5-10 second hold  []  Medication(s) available in this delivery method   []  Peak flow greater than or equal to 200 ml/min            Current Order Substituted To  (same drug, same frequency)   Aerosol to MDI [] Albuterol Sulfate 0.083% unit dose by aerosol Albuterol Sulfate MDI 2 puffs by inhalation with spacer    [] Levalbuterol 1.25 mg unit dose by aerosol Levalbuterol MDI 2 puffs by inhalation with spacer    [] Levalbuterol 0.63 mg unit dose by aerosol Levalbuterol MDI 2 puffs by inhalation with spacer    [] Ipratropium Bromide 0.02% unit dose by aerosol Ipratropium Bromide MDI 2 puffs by inhalation with spacer    [] Duoneb (Ipratropium + Albuterol) unit dose by aerosol Ipratropium MDI + Albuterol MDI 2 puffs by inhalation w/spacer   MDI to Aerosol [] Albuterol Sulfate MDI Albuterol Sulfate 0.083% unit dose by aerosol    [] Levalbuterol MDI 2 puffs by inhalation Levalbuterol 1.25 mg unit dose by aerosol    [] Ipratropium Bromide MDI by inhalation Ipratropium Bromide 0.02% unit dose by aerosol    [] Combivent (Ipratropium + Albuterol) MDI by inhalation Duoneb (Ipratropium + Albuterol) unit dose by aerosol   Treatment Assessment [Frequency/Schedule]:  Change frequency to: ____________TID duoneb______________________________________per Protocol, P&T, MEC      Points 0 1 2 3 4   Pulmonary Status  Non-Smoker  []   Smoking history   < 20 pack years  []   Smoking history  ?  20 pack years  [] Pulmonary Disorder  (acute or chronic)  []   Severe or Chronic w/ Exacerbation  [x]     Surgical Status No [x]   Surgeries     General []   Surgery Lower []   Abdominal Thoracic or []   Upper Abdominal Thoracic with  PulmonaryDisorder  []     Chest X-ray Clear/Not  Ordered     []  Chronic Changes  Results Pending  [x]  Infiltrates, atelectasis, pleural effusion, or edema  []  Infiltrates in more than one lobe []  Infiltrate + Atelectasis, &/or pleural effusion  []    Respiratory Pattern Regular,  RR = 12-20 [x]  Increased,  RR = 21-25 []  MENDEZ, irregular,  or RR = 26-30 []  Decreased FEV1  or RR = 31-35 []  Severe SOB, use  of accessory muscles, or RR ? 35  []    Mental Status Alert, oriented,  Cooperative [x]  Confused but Follows commands []  Lethargic or unable to follow commands []  Obtunded  []  Comatose  []    Breath Sounds Clear to  auscultation  []  Decreased unilaterally or  in bases only [x]  Decreased  bilaterally  []  Crackles or intermittent wheezes []  Wheezes []    Cough Strong, Spontan., & nonproductive [x]  Strong,  spontaneous, &  productive []  Weak,  Nonproductive []  Weak, productive or  with wheezes []  No spontaneous  cough or may require suctioning []    Level of Activity Ambulatory [x]  Ambulatory w/ Assist  []  Non-ambulatory []  Paraplegic []  Quadriplegic []    Total    Score:___6____     Triage Score:___4_____      Tri       Triage:     1. (>20) Freq: Q3    2. (16-20) Freq: Q4   3. (11-15) Freq: QID & Albuterol Q2 PRN    4. (6-10) Freq: TID & Albuterol Q2 PRN    5. (0-5) Freq Q4prn

## 2020-06-29 NOTE — ED NOTES
RRT at bedside for breathing treatments. Pt placed on bipap per RRT. Pt awake and alert upon initiation of bipap. HOB elevated.       Adair Escobar RN  06/29/20 [id number]

## 2020-06-29 NOTE — H&P
Hospital Medicine History & Physical      PCP: Goldy Soto MD    Date of Admission: 6/29/2020    Date of Service: 6/29/20      Chief Complaint:  Shortness of breath      History Of Present Illness:  75 y.o. female who presented to [facility] ED for complaints of progressively worsening shortness of breath over this past weekend. The patient was seen with family who state that the symptoms started Friday evening and have worsened over the week end. The patient has a known history of COPD and states that she feels similar to other exacerbations. The patient complains of a non productive cough and generalized weakness. She has a known history of anemia and was found to have a hemoglobin level of 5.6 in the ED. Denies melena, hematochezia and hematemesis. Lung sounds expiratory wheezing. Past Medical History:          Diagnosis Date    Anxiety     Asthma     dx 2019 / has smoked since age 14    CHF (congestive heart failure) (HCC)     Chronic back pain     Bilateral L5 S1 Radic on emg--surprisingly worse on the left than the right--pt's symptoms and her MRI show worse on the right    Chronic obstructive pulmonary disease with acute exacerbation ([address] 10/12/2019    Depression     Fibromyalgia     Hyperlipidemia     meds > 8 yrs    Hypertension     meds > 45 yrs    Osteoarthritis     Type II diabetes mellitus, uncontrolled ([address]     hx > 8 yrs    Unspecified sleep apnea        Past Surgical History:          Procedure Laterality Date    BACK SURGERY  2017    lumbar disc    CARDIAC CATHETERIZATION  11/3/14     DR. MIRELES / no stents    COLONOSCOPY  08/29/2016    w/polypectomy     DILATION AND CURETTAGE OF UTERUS N/A 10/7/2019    EUA HYSTEROSCOPY DILATATION AND CURETTAGE performed by Romi Tucker DO at [facility]. [address], COLON, DIAGNOSTIC      EYE SURGERY      Phaco with IOL OU / [address]    umbilical hernia repair    LA ESOPHAGOGASTRODUODENOSCOPY TRANSORAL DIAGNOSTIC N/A 3/24/2017    EGD ESOPHAGOGASTRODUODENOSCOPY performed by Shreya Javier MD at [facility] N/A 2/8/2018    negative findings    MN REVISE MEDIAN N/CARPAL TUNNEL SURG Left 6/5/2017    LEFT  CARPAL TUNNEL RELEASE performed by Johnna Hastings MD at [facility] FDKQ,8+H/U,AZTGA N/A 2/8/2018    TONSILLECTOMY      as child    UPPER GASTROINTESTINAL ENDOSCOPY  08/26/2016    w/bx     UPPER GASTROINTESTINAL ENDOSCOPY N/A 2/25/2020    EGD possible biopsy performed by Laury Ba MD at [facility]       Medications Prior to Admission:      Prior to Admission medications    Medication Sig Start Date End Date Taking? Authorizing Provider   insulin 70-30 (NOVOLIN 70/30) (70-30) 100 UNIT per ML injection vial 30-40  units twice a day if glucose more than 120 5/23/20   Matthew Romo MD   ACCU-CHEK PHYLLIS PLUS strip Test 3x daily Dx E11.65 5/23/20   Matthew Romo MD   Accu-Chek Softclix Lancets MISC bid 5/23/20   Matthew Romo MD   PARoxetine (PAXIL) 40 MG tablet TAKE 1 TABLET BY MOUTH ONCE DAILY IN THE MORNING 5/11/20   Ashley Mcneil MD   carvedilol (COREG) 25 MG tablet Take 1 tablet by mouth 2 times daily 5/11/20   Johnny Cortez MD   digoxin (LANOXIN) 125 MCG tablet Take 1 tablet by mouth daily 5/11/20   Johnny Cortez MD   hydrALAZINE (APRESOLINE) 100 MG tablet Take 100 mg by mouth 2 times daily    Historical Provider, MD   pantoprazole (PROTONIX) 40 MG tablet Take 40mg twice daily (1st dose 30min before breakfast) for 30 days.  After 30 days, you make take 40mg once daily before breakfast. 2/26/20   Altamese Plane, DO   furosemide (LASIX) 40 MG tablet Take 1 tablet by mouth daily 2/20/20   Ashley Mcneil MD   rosuvastatin (CRESTOR) 40 MG tablet Take 1 tablet by mouth every evening 2/20/20   Ashley Mcneil MD   dicyclomine (BENTYL) 10 MG capsule Take 1 capsule by mouth 4 times daily as needed (abdominal pain) 12/9/19   Bella Abbasi [place], APRN - CNP   ondansetron (ZOFRAN) 4 MG tablet Take 1 tablet by mouth every 8 hours as needed for Nausea or Vomiting 19   JOHNATHON Rodrigez CNP   Insulin Syringe-Needle U-100 (INSULIN SYRINGE 1CC/31G\") 31G X \" 1 ML MISC 1 each by Does not apply route daily 19   Linnette Muhammad MD   lisinopril (PRINIVIL;ZESTRIL) 10 MG tablet Take 1 tablet by mouth 2 times daily 10/16/19   Ramiro Wild MD   spironolactone (ALDACTONE) 25 MG tablet Take 1 tablet by mouth daily 10/17/19   Ramiro Wild MD       Allergies:  Ibuprofen; Metformin and related; and Darvon [propoxyphene hcl]    Social History:      The patient currently lives home    TOBACCO:   reports that she has quit smoking. Her smoking use included cigarettes. She started smoking about 2 years ago. She has a 59.00 pack-year smoking history. She has never used smokeless tobacco.  ETOH:   reports previous alcohol use. Family History:       Positive as follows:        Problem Relation Age of Onset    Heart Disease Father         cardiac bypass    Arthritis Father     Arthritis Mother     Other Mother          at age 80    Other Sister         nu\[facility]\""manjinder [facility] hx    No Known Problems Daughter     Stroke Son        REVIEW OF SYSTEMS:   Pertinent positives as noted in the HPI. All other systems reviewed and negative. PHYSICAL EXAM:    BP (!) 163/73   Pulse 82   Resp 19   Ht 4' 11\" (1.499 m)   Wt 120 lb (54.4 kg)   LMP  (LMP Unknown)   SpO2 100%   BMI 24.24 kg/m²     General appearance:  No apparent distress, appears stated age and cooperative. HEENT:  Normal cephalic, atraumatic without obvious deformity. Pupils equal, round, and reactive to light. Extra ocular muscles intact. Conjunctivae/corneas clear. Neck: Supple, with full range of motion. No jugular venous distention. Trachea midline. Respiratory:  labored respiratory effort.  Expiratory wheezing  Cardiovascular:  Regular rate and rhythm with normal S1/S2 without murmurs, rubs or gallops. Abdomen: Soft, non-tender, non-distended with normal bowel sounds. Musculoskeletal:  No clubbing, cyanosis or edema bilaterally. Full range of motion without deformity. Skin: Skin color, texture, turgor normal.  No rashes or lesions. Neurologic:  Neurovascularly intact without any focal sensory/motor deficits. Cranial nerves: II-XII intact, grossly non-focal.  Psychiatric:  Alert and oriented, thought content appropriate, normal insight  Capillary Refill: Brisk,< 3 seconds   Peripheral Pulses: +2 palpable, equal bilaterally       Labs:     Recent Labs     06/29/20  0715 06/29/20  0832   WBC 8.0  --    HGB 5.6* 6.3*   HCT 19.4*  --    *  --      Recent Labs     06/29/20  0715 06/29/20  0832     --    K 4.9  --    *  --    CO2 21  --    BUN 23  --    CREATININE 1.12* 1.1   CALCIUM 9.8  --      Recent Labs     06/29/20  0715   AST 44*   ALT 22   BILITOT 0.3   ALKPHOS 105     Recent Labs     06/29/20  0715   INR 1.0     Recent Labs     06/29/20  0715   CKTOTAL 280*   TROPONINI 0.019*       Urinalysis:      Lab Results   Component Value Date    NITRU Negative 10/28/2019    WBCUA 6-10 10/23/2019    BACTERIA RARE 10/23/2019    RBCUA 0-2 10/23/2019    BLOODU Negative 10/28/2019    SPECGRAV 1.008 10/28/2019    GLUCOSEU Negative 10/28/2019       Radiology:     CXR: I have reviewed the CXR with the following interpretation: pending  EKG:  I have reviewed the EKG with the following interpretation: pending    XR CHEST PORTABLE    (Results Pending)       ASSESSMENT:    Active Hospital Problems    Diagnosis Date Noted    COPD exacerbation ([facility] [address] [J44.1] 06/29/2020       PLAN:        DVT Prophylaxis: scd  Diet: No diet orders on file  Code Status: Prior    PT/OT Eval Status: eval and tx    1. AE COPD-will admit and medicate with iv steroids, oxygen and aerosol tx  2. Anemia-will transfuse 1 unit of prbc's and monitor serial hgb levels  3.  Shortness of breath  4. Generalized weakness       Mo Sampson [facility]    Thank you Boom Vance MD for the opportunity to be involved in this patient's care. If you have any questions or concerns please feel free to contact me. 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 Pt improved with steroids and BIPAP. Will continue to wean O2. CXR with bilateral interstitial infiltrates. COVID sent. 1U blood to transfuse and will recheck Hgb. Close monitoring and GI consult. PPI BID    I saw and evaluated the pt. I personally obtained the key and critical portions of the history and physical exam and made additions where appropriate in the documentation.  I reviewed the mid level documentation and agree with assessment and plan that we come up with together    Zenobia Mendez, DO  Internal Medicine

## 2020-06-29 NOTE — ED NOTES
Pt sitting upright in ED cot at this time w/no s/s of distress noted. Pt tolerating bipap at this time. Resp even and unlabored. Will continue to monitor pt.       Ceci Cruz RN  06/29/20 [id number]

## 2020-06-29 NOTE — CONSULTS
Consults    Patient Name: Dinorah Khan  Admit Date: 2020  7:00 AM  MR #: [id number]  : 1945    Attending Physician: Jaime Almonte DO  Reason for consult: SOB    History of Presenting Illness:      Dinorah Khan is a 75 y.o. female on hospital day 0 with a history of . History Obtained From:  patient, electronic medical record    Admitted with SOB and weakness. She states she stopped taking some meds at home due to weakness and dizziness. She is noted to have worsening Anemia. She has known Gatric AVMs and has had studies previously. She has no CP. She has no evidence of blood loss. She has been transfused already. History:      EKG:SR   Past Medical History:   Diagnosis Date    Anxiety     Asthma     dx  / has smoked since age 14    CHF (congestive heart failure) (HCC)     Chronic back pain     Bilateral L5 S1 Radic on emg--surprisingly worse on the left than the right--pt's symptoms and her MRI show worse on the right    Chronic obstructive pulmonary disease with acute exacerbation ([address] 10/12/2019    Depression     Fibromyalgia     Hyperlipidemia     meds > 8 yrs    Hypertension     meds > 45 yrs    Osteoarthritis     Type II diabetes mellitus, uncontrolled ([address]     hx > 8 yrs    Unspecified sleep apnea      Past Surgical History:   Procedure Laterality Date    BACK SURGERY  2017    lumbar disc    CARDIAC CATHETERIZATION  11/3/14     DR. MIRELES / no stents    COLONOSCOPY  2016    w/polypectomy     DILATION AND CURETTAGE OF UTERUS N/A 10/7/2019    EUA HYSTEROSCOPY DILATATION AND CURETTAGE performed by Leonor Crockett DO at [facility]. [address], COLON, DIAGNOSTIC      EYE SURGERY      [place] with IOL OU / [address] [place]  [address]    umbilical hernia repair    IN ESOPHAGOGASTRODUODENOSCOPY TRANSORAL DIAGNOSTIC N/A 3/24/2017    EGD ESOPHAGOGASTRODUODENOSCOPY performed by Shreya Javier MD at [address],DIAGNOSTIC ABDOMEN N/A 2018    negative findings    [place] REVISE MEDIAN N/CARPAL TUNNEL SURG Left 2017    LEFT  CARPAL TUNNEL RELEASE performed by [facility]ppola [facility] at [facility]J,[facility] N/A 2018    TONSILLECTOMY      as child    UPPER GASTROINTESTINAL ENDOSCOPY  2016    w/bx     UPPER GASTROINTESTINAL ENDOSCOPY N/A 2020    EGD possible biopsy performed by Artur Aldrich MD at [address] [place] [address] History  Family History   Problem Relation Age of Onset    Heart Disease Father         cardiac bypass    Arthritis Father     Arthritis Mother     Other Mother          at age 80    Other Sister         [facility]    No Known Problems Daughter     Stroke Son      [] Unable to obtain due to ventilated and/ or neurologic status    Social History     Socioeconomic History    Marital status:      Spouse name: Not on file    Number of children: Not on file    Years of education: Not on file    Highest education level: Not on file   Occupational History    Occupation: Retired-   Social Needs    Financial resource strain: Not on file    Food insecurity     Worry: Not on file     Inability: Not on file   [employer] needs     Medical: Not on file     Non-medical: Not on file   Tobacco Use    Smoking status: Former Smoker     Packs/day: 1.00     Years: 59.00     Pack years: 59.00     Types: Cigarettes     Start date: 2017    Smokeless tobacco: Never Used    Tobacco comment: quit 2-3 weeks ago    Substance and Sexual Activity    Alcohol use: Not Currently    Drug use: No    Sexual activity: Yes   Lifestyle    Physical activity     Days per week: Not on file     Minutes per session: Not on file    Stress: Not on file   Relationships    Social connections     Talks on phone: Not on file     Gets together: Not on file     Attends Evangelical service: Not on file     Active member of club or organization: Not on file     Attends meetings of clubs or organizations: Not on file     Relationship status: Not on file    Intimate partner violence     Fear of current or ex partner: Not on file     Emotionally abused: Not on file     Physically abused: Not on file     Forced sexual activity: Not on file   Other Topics Concern    Not on file   Social History Narrative    Not on file      [] Unable to obtain due to ventilated and/ or neurologic status      Home Medications:      Medications Prior to Admission: insulin 70-30 (NOVOLIN 70/30) (70-30) 100 UNIT per ML injection vial, 30-40  units twice a day if glucose more than 120  ACCU-CHEK PHYLLIS PLUS strip, Test 3x daily Dx E11.65  Accu-Chek Softclix Lancets MISC, bid  PARoxetine (PAXIL) 40 MG tablet, TAKE 1 TABLET BY MOUTH ONCE DAILY IN THE MORNING  carvedilol (COREG) 25 MG tablet, Take 1 tablet by mouth 2 times daily  digoxin (LANOXIN) 125 MCG tablet, Take 1 tablet by mouth daily  hydrALAZINE (APRESOLINE) 100 MG tablet, Take 100 mg by mouth 2 times daily  pantoprazole (PROTONIX) 40 MG tablet, Take 40mg twice daily (1st dose 30min before breakfast) for 30 days.  After 30 days, you make take 40mg once daily before breakfast.  furosemide (LASIX) 40 MG tablet, Take 1 tablet by mouth daily  rosuvastatin (CRESTOR) 40 MG tablet, Take 1 tablet by mouth every evening  dicyclomine (BENTYL) 10 MG capsule, Take 1 capsule by mouth 4 times daily as needed (abdominal pain)  ondansetron (ZOFRAN) 4 MG tablet, Take 1 tablet by mouth every 8 hours as needed for Nausea or Vomiting  Insulin Syringe-Needle U-100 (INSULIN SYRINGE 1CC/31GX5/16\") 31G X 5/16\" 1 ML MISC, 1 each by Does not apply route daily  lisinopril (PRINIVIL;ZESTRIL) 10 MG tablet, Take 1 tablet by mouth 2 times daily  spironolactone (ALDACTONE) 25 MG tablet, Take 1 tablet by mouth daily    Current Hospital Medications:     Scheduled Meds:   sodium chloride  20 mL Intravenous Once    sodium chloride flush  10 mL Intravenous 2 times per day    methylPREDNISolone  40 mg Intravenous Q6H    Followed by   Kiana Ramírez ON 7/1/2020] predniSONE  40 mg Oral Daily    sodium chloride  20 mL Intravenous Once    albuterol-ipratropium  1 puff Inhalation TID    nicotine  1 patch Transdermal Daily    [START ON 6/30/2020] levofloxacin  750 mg Intravenous Q24H    digoxin  125 mcg Oral Daily    PARoxetine  40 mg Oral Daily    rosuvastatin  40 mg Oral QPM    furosemide  40 mg Oral Daily    hydrALAZINE  100 mg Oral BID    pantoprazole  40 mg Intravenous BID    And    sodium chloride (PF)  10 mL Intravenous Daily    insulin lispro  0-6 Units Subcutaneous Q6H    insulin lispro protamine & lispro  20 Units Subcutaneous BID WC    carvedilol  37.5 mg Oral BID     Continuous Infusions:   dextrose       PRN Meds:.albuterol, sodium chloride flush, acetaminophen **OR** acetaminophen, polyethylene glycol, promethazine **OR** ondansetron, glucose, dextrose, glucagon (rDNA), dextrose  .  dextrose          Allergies: Allergies   Allergen Reactions    Ibuprofen Nausea Only    Metformin And Related      Diarrhea      Darvon [Propoxyphene Hcl] Nausea And Vomiting        Review of Systems:       Review of Systems   Constitutional: Negative. Negative for diaphoresis and fatigue. HENT: Negative. Eyes: Negative. Respiratory: Positive for shortness of breath. Negative for cough, chest tightness, wheezing and stridor. Cardiovascular: Negative. Negative for chest pain, palpitations and leg swelling. Gastrointestinal: Negative. Negative for blood in stool and nausea. Genitourinary: Negative. Musculoskeletal: Negative. Skin: Negative. Neurological: Positive for weakness. Negative for dizziness, syncope and light-headedness. Hematological: Negative. Psychiatric/Behavioral: Negative.           Objective Findings:     Vitals:BP (!) 164/67   Pulse 101   Temp 97.3 °F (36.3 °C) (Oral)   Resp 18   Ht 4' 11\" (1.499 m) Wt 125 lb (56.7 kg)   LMP  (LMP Unknown)   SpO2 100%   BMI 25.25 kg/m²      Physical Examination:    Physical Exam   Constitutional: She appears healthy. No distress. HENT:   Normal cephalic and Atraumatic   Eyes: Pupils are equal, round, and reactive to light. Neck: Normal range of motion and thyroid normal. Neck supple. No JVD present. No neck adenopathy. No thyromegaly present. Cardiovascular: Normal rate, regular rhythm, intact distal pulses and normal pulses. Murmur heard. Pulmonary/Chest: Effort normal. She has no wheezes. She has bibasilar rales. She exhibits no tenderness. Abdominal: Soft. Bowel sounds are normal. There is no abdominal tenderness. Musculoskeletal: Normal range of motion. General: No tenderness or edema. Neurological: She is alert and oriented to person, place, and time. Skin: Skin is warm. No cyanosis. Nails show no clubbing.          Results/ Medications reviewed 6/29/2020, 5:46 PM     Laboratory, Microbiology, Pathology, Radiology, Cardiology, Medications and Transcriptions reviewed  Scheduled Meds:   sodium chloride  20 mL Intravenous Once    sodium chloride flush  10 mL Intravenous 2 times per day    methylPREDNISolone  40 mg Intravenous Q6H    Followed by   Jean Claude Kent ON 7/1/2020] predniSONE  40 mg Oral Daily    sodium chloride  20 mL Intravenous Once    albuterol-ipratropium  1 puff Inhalation TID    nicotine  1 patch Transdermal Daily    [START ON 6/30/2020] levofloxacin  750 mg Intravenous Q24H    digoxin  125 mcg Oral Daily    PARoxetine  40 mg Oral Daily    rosuvastatin  40 mg Oral QPM    furosemide  40 mg Oral Daily    hydrALAZINE  100 mg Oral BID    pantoprazole  40 mg Intravenous BID    And    sodium chloride (PF)  10 mL Intravenous Daily    insulin lispro  0-6 Units Subcutaneous Q6H    insulin lispro protamine & lispro  20 Units Subcutaneous BID WC    carvedilol  37.5 mg Oral BID     Continuous Infusions:   dextrose         Recent Labs 06/29/20  0715 06/29/20  0832   WBC 8.0  --    HGB 5.6* 6.3*   HCT 19.4*  --    MCV 62.4*  --    *  --      Recent Labs     06/29/20  0715 06/29/20  0832     --    K 4.9  --    *  --    CO2 21  --    BUN 23  --    CREATININE 1.12* 1.1     Recent Labs     06/29/20  0715   AST 44*   ALT 22   BILITOT 0.3   ALKPHOS 105     No results for input(s): LIPASE, AMYLASE in the last 72 hours. Recent Labs     06/29/20  0715   PROT 6.7   INR 1.0     Xr Chest Portable    Result Date: 6/29/2020  XR CHEST PORTABLE : 6/29/2020 CLINICAL HISTORY: Chest pain and shortness of breath. COMPARISON: None available. TECHNIQUE: An upright portable AP radiograph of the chest was obtained. FINDINGS: The heart is mildly enlarged with mild to moderate vascular congestion, small pleural effusions and mild interstitial and airspace infiltrates of the mid to lower lung fields, probably edema. Atypical pneumonia should be excluded clinically. There is no pneumothorax, or displaced fractures identified. PROBABLE MILD TO MODERATE CARDIAC DECOMPENSATION WITH PULMONARY INTERSTITIAL EDEMA AND SMALL PLEURAL EFFUSIONS. ATYPICAL PNEUMONIA SHOULD BE EXCLUDED CLINICALLY. Active Hospital Problems    Diagnosis Date Noted    COPD exacerbation ([facility] [J44.1] 06/29/2020     Priority: Low    Anemia [D64.9]      Priority: Low    AVM (arteriovenous malformation) [Q27.30]      Priority: Low         Impression/Plan:   1. Acute on Chronic DHF - change Lasix to iv. Her Lung exam is wet but has no significant peripheral edema. Follow Labs  2. HOCM- no KYLE. need to lower HR. Advance Coreg to 37.5 q12.  3. Anemia- history of gastric AVM. W/u in progress. 4. HTN Urgency- will check Renal Duplex. 5. CAD mild -stable     Thank you for allowing us to participate in the care of this patient. Will continue to follow. Please call if questions or concerns arise.     Electronically signed by Palak Bowen MD on 6/29/2020 at 5:46 PM

## 2020-06-29 NOTE — ED NOTES
RRT at bedside for ABG at this time. Consent for blood transfusion obtained and placed on pt's chart. Type and screen and digoxin level drawn from pt's IV and sent to lab w/pt labels.       Balbina Vargas RN  06/29/20 2888

## 2020-06-29 NOTE — CARE COORDINATION
[facility] Case Management Initial Discharge Assessment    Met with Patient to discuss discharge plan. PCP: Dru Estes MD                                Date of Last Visit:  Irais Aquino    If no PCP, list provided? N/A    Discharge Planning    Living Arrangements: independently at home    Who do you live with? SPOUSE    Who helps you with your care:  self    If lives at home:     Do you have any barriers navigating in your home? no    Patient can perform ADL? Yes    Current Services (outpatient and in home) :  None    Dialysis: No    Is transportation available to get to your appointments? Yes    DME Equipment:  no    Respiratory equipment: None    Respiratory provider:  no     Pharmacy:  yes - [facility]    Consult with Medication Assistance Program?  No      Patient agreeable to [facility] ? Declined    Patient agreeable to SNF/Rehab? N/A    Other discharge needs identified? N/A    Freedom of choice list provided with basic dialogue that supports the patient's individualized plan of care/goals and shares the quality data associated with the providers. N/A      The plan for Transition of Care is related to the following treatment goals: [address]    Initial Discharge Plan? (Note: please see concurrent daily documentation for any updates after initial note).     DENIES NEEDS AT DC    The Patient and/or patient representative: PATIENT was provided with choice of any post-acute providers for care and equipment and agrees with discharge plan  Yes    Electronically signed by Ulisses Pedro RN on 6/29/2020 at 2:53 PM

## 2020-06-29 NOTE — PROGRESS NOTES
Physical Therapy Missed Treatment   Facility/Department: [facility] MED SURG [id number]    NAME: Carisa Johnson    : 1945 (75 y.o.)  MRN: [id number]    Account: [de-identified]  Gender: female      PT evaluation and treatment orders received. Chart reviewed. Hgb = 5.6 per most recent lab draw. Receiving transfusion at this time. Will hold PT evaluation today. Nursing staff notified. Will follow and attempt PT evaluation again at earliest availability.        Hilda Haro, PT, 20 at 1:43 PM

## 2020-06-29 NOTE — CONSULTS
CATHETERIZATION  11/3/14     DR. MIRELES / no stents    COLONOSCOPY  2016    w/polypectomy     DILATION AND CURETTAGE OF UTERUS N/A 10/7/2019    EUA HYSTEROSCOPY DILATATION AND CURETTAGE performed by Gala Holley DO at [facility]. [address], COLON, DIAGNOSTIC      EYE SURGERY      [place] with IOL OU / [address]  [address]    umbilical hernia repair    [place] ESOPHAGOGASTRODUODENOSCOPY TRANSORAL DIAGNOSTIC N/A 3/24/2017    EGD ESOPHAGOGASTRODUODENOSCOPY performed by Hodan Lawson MD at [facility] N/A 2018    negative findings    [place] REVISE MEDIAN N/CARPAL TUNNEL SURG Left 2017    LEFT  CARPAL TUNNEL RELEASE performed by Nicole Coppola MD at [facility],2+A/S,[facility] N/A 2018    TONSILLECTOMY      as child    UPPER GASTROINTESTINAL ENDOSCOPY  2016    w/bx     UPPER GASTROINTESTINAL ENDOSCOPY N/A 2020    EGD possible biopsy performed by Artur Aldrich MD at [address] History  Family History   Problem Relation Age of Onset    Heart Disease Father         cardiac bypass    Arthritis Father     Arthritis Mother     Other Mother          at age 80    Other Sister         [facility]    No Known Problems Daughter     Stroke Son      [] Unable to obtain due to ventilated and/ or neurologic status  Social History     Socioeconomic History    Marital status:      Spouse name: Not on file    Number of children: Not on file    Years of education: Not on file    Highest education level: Not on file   Occupational History    Occupation: Retired-   Social Needs    Financial resource strain: Not on file    Food insecurity     Worry: Not on file     Inability: Not on file   [employer] needs     Medical: Not on file     Non-medical: Not on file   Tobacco Use    Smoking status: Former Smoker     Packs/day: 1.00     Years: 59.00     Pack years: 59.00     Types: Cigarettes     Start date: 11/30/2017    Smokeless tobacco: Never Used    Tobacco comment: quit 2-3 weeks ago    Substance and Sexual Activity    Alcohol use: Not Currently    Drug use: No    Sexual activity: Yes   Lifestyle    Physical activity     Days per week: Not on file     Minutes per session: Not on file    Stress: Not on file   Relationships    Social connections     Talks on phone: Not on file     Gets together: Not on file     Attends Moravian service: Not on file     Active member of club or organization: Not on file     Attends meetings of clubs or organizations: Not on file     Relationship status: Not on file    Intimate partner violence     Fear of current or ex partner: Not on file     Emotionally abused: Not on file     Physically abused: Not on file     Forced sexual activity: Not on file   Other Topics Concern    Not on file   Social History Narrative    Not on file      [] Unable to obtain due to ventilated and/ or neurologic status    Home Medications:      Medications Prior to Admission: insulin 70-30 (NOVOLIN 70/30) (70-30) 100 UNIT per ML injection vial, 30-40  units twice a day if glucose more than 120  ACCU-CHEK PHYLLIS PLUS strip, Test 3x daily Dx E11.65  Accu-Chek Softclix Lancets MISC, bid  PARoxetine (PAXIL) 40 MG tablet, TAKE 1 TABLET BY MOUTH ONCE DAILY IN THE MORNING  carvedilol (COREG) 25 MG tablet, Take 1 tablet by mouth 2 times daily  digoxin (LANOXIN) 125 MCG tablet, Take 1 tablet by mouth daily  hydrALAZINE (APRESOLINE) 100 MG tablet, Take 100 mg by mouth 2 times daily  pantoprazole (PROTONIX) 40 MG tablet, Take 40mg twice daily (1st dose 30min before breakfast) for 30 days.  After 30 days, you make take 40mg once daily before breakfast.  furosemide (LASIX) 40 MG tablet, Take 1 tablet by mouth daily  rosuvastatin (CRESTOR) 40 MG tablet, Take 1 tablet by mouth every evening  dicyclomine (BENTYL) 10 MG capsule, Take 1 capsule by mouth 4 times COMPARISON: None available. TECHNIQUE: An upright portable AP radiograph of the chest was obtained. FINDINGS: The heart is mildly enlarged with mild to moderate vascular congestion, small pleural effusions and mild interstitial and airspace infiltrates of the mid to lower lung fields, probably edema. Atypical pneumonia should be excluded clinically. There is no pneumothorax, or displaced fractures identified. PROBABLE MILD TO MODERATE CARDIAC DECOMPENSATION WITH PULMONARY INTERSTITIAL EDEMA AND SMALL PLEURAL EFFUSIONS. ATYPICAL PNEUMONIA SHOULD BE EXCLUDED CLINICALLY. Impression:   16-year-old -American female admitted with shortness of breath and generalized weakness found to be anemic with a hemoglobin around 5, baseline hemoglobin is 7-8, previous similar admission in February 2020 with EGD that showed multiple nonbleeding duodenal AVMs and antral erosions, patient is currently in COVID-19 isolation with BiPAP for COPD exacerbation. Anemia is most likely related to AVMs, not on anticoagulation  Plan:   1. Continue IV PPI BID  2. Continue course of care, trend H&H, transfuse to keep hemoglobin greater than 7.5  3. Endoscopic evaluation pending COVID-19 testing and clinical course, will most likely need push enteroscopy and follow up OP colonoscopy and small bowel pill capsule endoscopy. Comments: Thank you for allowing us to participate in the care of this patient. Will continue to follow. Please call if questions or concerns arise. A/P  Agree regarding assessment and plan. Patient in on COVID-19 isolation admitted owing to shortness of breath and was found to have anemia in the context of COPD. Enoc Sotelo Patient known to us from previous hospitalization she has well known history of AVMs was previously EGD in February 2020. Showing duodenal AVMs. We will proceed with push enteroscopy with AVM ablation upon pulmonary status optimization.   Associated medical conditions include COPD,

## 2020-06-29 NOTE — CONSULTS
.  Infectious Diseases Inpatient Consult Note      Reason for Consult:   Possible COVID-19 pneumonia  Requesting Physician:   Dr. MOSQUERA [facility] [employer]  Primary Care Physician:  Matt Massey MD  History Obtained From:   Pt, EPIC    Admit Date: 6/29/2020  Hospital Day: 1      HISTORY OF PRESENT ILLNESS:  This is a 75 y.o. female was admitted to [address] [place] [address]  from home  through ER with progressive shortness of breath over the past 3 days, has a chronic cough, became productive recently. Uses oxygen at 3 to 4 L at home. Has generalized weakness decreased appetite. stopped smoking last year. Has been confined at home except going to doctor's appointment. Episode with a  who owns a business. Denies any fevers or chills, no GI symptoms or  symptoms. No altered smell or taste. CHIEF COMPLAINT:      Past Medical History:   Diagnosis Date    Anxiety     Asthma     dx 2019 / has smoked since age 14    CHF (congestive heart failure) (HCC)     Chronic back pain     Bilateral L5 S1 Radic on emg--surprisingly worse on the left than the right--pt's symptoms and her MRI show worse on the right    Chronic obstructive pulmonary disease with acute exacerbation ([address] 10/12/2019    Depression     Fibromyalgia     Hyperlipidemia     meds > 8 yrs    Hypertension     meds > 45 yrs    Osteoarthritis     Type II diabetes mellitus, uncontrolled ([address]     hx > 8 yrs    Unspecified sleep apnea        Past Surgical History:   Procedure Laterality Date    BACK SURGERY  2017    lumbar disc    CARDIAC CATHETERIZATION  11/3/14     DR. MIRELES / no stents    COLONOSCOPY  08/29/2016    w/polypectomy     DILATION AND CURETTAGE OF UTERUS N/A 10/7/2019    EUA HYSTEROSCOPY DILATATION AND CURETTAGE performed by Gala Holley DO at [facility]. [address], COLON, DIAGNOSTIC      EYE SURGERY      [place] with IOL OU / [id number]    umbilical hernia repair    AL ESOPHAGOGASTRODUODENOSCOPY TRANSORAL DIAGNOSTIC N/A 3/24/2017    EGD ESOPHAGOGASTRODUODENOSCOPY performed by Mia Martinez MD at [facility] N/A 2/8/2018    negative findings    W[place] REVISE MEDIAN N/CARPAL TUNNEL SURG Left 6/5/2017    LEFT  CARPAL TUNNEL RELEASE performed by Jana Rodriguez MD at [facility],3+N/C,VVUHF N/A 2/8/2018    TONSILLECTOMY      as child    UPPER GASTROINTESTINAL ENDOSCOPY  08/26/2016    w/bx     UPPER GASTROINTESTINAL ENDOSCOPY N/A 2/25/2020    EGD possible biopsy performed by Mackenzie Meier MD at [facility]       Current Medications:     sodium chloride  20 mL Intravenous Once    sodium chloride flush  10 mL Intravenous 2 times per day    methylPREDNISolone  40 mg Intravenous Q6H    Followed by   Vivienne Charles ON 7/1/2020] predniSONE  40 mg Oral Daily    sodium chloride  20 mL Intravenous Once    albuterol-ipratropium  1 puff Inhalation TID    nicotine  1 patch Transdermal Daily    [START ON 6/30/2020] levofloxacin  750 mg Intravenous Q24H    digoxin  125 mcg Oral Daily    PARoxetine  40 mg Oral Daily    rosuvastatin  40 mg Oral QPM    carvedilol  25 mg Oral BID    furosemide  40 mg Oral Daily    hydrALAZINE  100 mg Oral BID    pantoprazole  40 mg Intravenous BID    And    sodium chloride (PF)  10 mL Intravenous Daily    insulin lispro  0-6 Units Subcutaneous Q6H    insulin lispro protamine & lispro  20 Units Subcutaneous BID WC       Allergies:  Ibuprofen; Metformin and related; and Darvon [propoxyphene hcl]    Social History     Socioeconomic History    Marital status:      Spouse name: Not on file    Number of children: Not on file    Years of education: Not on file    Highest education level: Not on file   Occupational History    Occupation: Retired-   Social Needs    Financial resource strain: Not on file    Food insecurity     Worry: Not on file     Inability: Not on file   [employer] needs Medical: Not on file     Non-medical: Not on file   Tobacco Use    Smoking status: Former Smoker     Packs/day: 1.00     Years: 59.00     Pack years: 59.00     Types: Cigarettes     Start date: 2017    Smokeless tobacco: Never Used    Tobacco comment: quit 2-3 weeks ago    Substance and Sexual Activity    Alcohol use: Not Currently    Drug use: No    Sexual activity: Yes   Lifestyle    Physical activity     Days per week: Not on file     Minutes per session: Not on file    Stress: Not on file   Relationships    Social connections     Talks on phone: Not on file     Gets together: Not on file     Attends Restorationism service: Not on file     Active member of club or organization: Not on file     Attends meetings of clubs or organizations: Not on file     Relationship status: Not on file    Intimate partner violence     Fear of current or ex partner: Not on file     Emotionally abused: Not on file     Physically abused: Not on file     Forced sexual activity: Not on file   Other Topics Concern    Not on file   Social History Narrative    Not on file         Family History:   Family History   Problem Relation Age of Onset    Heart Disease Father         cardiac bypass    Arthritis Father     Arthritis Mother     Other Mother          at age 80   [facility] Other Sister         [facility]    No Known Problems Daughter     Stroke Son        Review of Systems  14 system review is negative other than HPI    Physical Exam  Vitals:    20 1205 20 1220 20 1330 20 1423   BP: (!) 168/73 (!) 197/80 (!) 162/71 (!) 177/71   Pulse: 88 80 88 105   Resp: 18 18 18 18   Temp: 97.3 °F (36.3 °C) 97.5 °F (36.4 °C) 96.3 °F (35.7 °C) 97.5 °F (36.4 °C)   TempSrc: Oral Oral Oral Oral   SpO2:  100% 100% 100%   Weight:       Height:         General Appearance: alert and oriented to person, place and time, well-developed and well-nourished, in no acute distress  On room air, refusing to wear BiPAP  Skin: warm and dry, no rash. Head: normocephalic and atraumatic  Eyes: pupils equal, round, and reactive to light,extraocular eye movements intact, conjunctivae normal, anicteric sclerae  ENT: oropharynx clear and moist with normal mucous membranes. No thrush  Lungs: normal respiratory effort, bibasilar crackles, no wheezes  Heart:RRR, nl S1/S2, no murmur  Abdomen: soft, no tenderness, no H-S-megaly, + BS  NEUROLOGICAL: alert and oriented x 3, no focal deficits  No leg edema  No erythema, no warmth, no tenderness        DATA:    Lab Results   Component Value Date    WBC 8.0 06/29/2020    HGB 6.3 (LL) 06/29/2020    HCT 19.4 (LL) 06/29/2020    MCV 62.4 (L) 06/29/2020     (H) 06/29/2020     Lab Results   Component Value Date    CREATININE 1.1 06/29/2020    BUN 23 06/29/2020     06/29/2020    K 4.9 06/29/2020     (H) 06/29/2020    CO2 21 06/29/2020       Hepatic Function Panel:   Lab Results   Component Value Date    ALKPHOS 105 06/29/2020    ALT 22 06/29/2020    AST 44 06/29/2020    PROT 6.7 06/29/2020    BILITOT 0.3 06/29/2020    BILIDIR <0.2 01/19/2019    IBILI see below 01/19/2019    LABALBU 4.1 06/29/2020       Microbiology:   No results for input(s): BC in the last 72 hours. No results for input(s): Lonia Jagjit in the last 72 hours. No results for input(s): LABURIN in the last 72 hours. No results for input(s): WNDABS in the last 72 hours. No results for input(s): CULTRESP in the last 72 hours.       Imaging:     Impression       PROBABLE MILD TO MODERATE CARDIAC DECOMPENSATION WITH PULMONARY INTERSTITIAL EDEMA AND SMALL PLEURAL EFFUSIONS.       ATYPICAL PNEUMONIA SHOULD BE EXCLUDED CLINICALLY.           IMPRESSION:    · Atypical community-acquired pneumonia, without COVID-19  · Acute CHF exacerbation secondary to hypertrophic cardiomyopathy  · COPD  · Acute and chronic respiratory failure    Patient Active Problem List   Diagnosis    Hypertension    Hyperlipidemia    Uncontrolled repeat COVID-19  · Avoid aerosolized treatment  · Avoid BiPAP  · Agree with diuresis and treatment of COPD  · Continue Levaquin  · Order procalcitonin CRP  · Urine for Streptococcus pneumonia and Legionella antigen  · Follow-up blood cultures chest x-ray  · Agree with cardiology evaluation    Discussed with patient and RN    Giovanna Fajardo MD

## 2020-06-29 NOTE — PROGRESS NOTES
[facility] Occupational Therapy      Date: 2020  Patient Name: Da Morales        MRN: [id number]  Account: [de-identified]   : 1945  (75 y.o.)  Room: [place] [address]    Chart reviewed, attempted OT at [phone] for evaluation. Patient not seen 2° to:    Hold per nursing request due to: Pt with decreased hemoglobin    Spoke to Gwen Sanchez RN aware. Will attempt again when able.     Electronically signed by MONICA Lee on 3/60/7070 at 1:43 PM

## 2020-06-30 ENCOUNTER — APPOINTMENT (OUTPATIENT)
Dept: ULTRASOUND IMAGING | Age: 75
DRG: 291 | End: 2020-06-30
Payer: MEDICARE

## 2020-06-30 LAB
ANION GAP SERPL CALCULATED.3IONS-SCNC: 17 MEQ/L (ref 9–15)
ANISOCYTOSIS: ABNORMAL
BASOPHILS ABSOLUTE: 0 K/UL (ref 0–0.2)
BASOPHILS RELATIVE PERCENT: 0.1 %
BLOOD BANK DISPENSE STATUS: NORMAL
BLOOD BANK PRODUCT CODE: NORMAL
BPU ID: NORMAL
BUN BLDV-MCNC: 44 MG/DL (ref 8–23)
C-REACTIVE PROTEIN: 2.4 MG/L (ref 0–5)
CALCIUM SERPL-MCNC: 8.9 MG/DL (ref 8.5–9.9)
CHLORIDE BLD-SCNC: 102 MEQ/L (ref 95–107)
CO2: 17 MEQ/L (ref 20–31)
CREAT SERPL-MCNC: 1.49 MG/DL (ref 0.5–0.9)
DESCRIPTION BLOOD BANK: NORMAL
EOSINOPHILS ABSOLUTE: 0 K/UL (ref 0–0.7)
EOSINOPHILS RELATIVE PERCENT: 0 %
GFR AFRICAN AMERICAN: 41.2
GFR NON-AFRICAN AMERICAN: 34
GLUCOSE BLD-MCNC: 331 MG/DL (ref 60–115)
GLUCOSE BLD-MCNC: 360 MG/DL (ref 70–99)
GLUCOSE BLD-MCNC: 381 MG/DL (ref 60–115)
GLUCOSE BLD-MCNC: 383 MG/DL (ref 60–115)
GLUCOSE BLD-MCNC: 542 MG/DL (ref 60–115)
HCT VFR BLD CALC: 21 % (ref 37–47)
HCT VFR BLD CALC: 22.7 % (ref 37–47)
HCT VFR BLD CALC: 28.3 % (ref 37–47)
HCT VFR BLD CALC: 28.8 % (ref 37–47)
HEMOGLOBIN: 6.3 G/DL (ref 12–16)
HEMOGLOBIN: 6.7 G/DL (ref 12–16)
HEMOGLOBIN: 8.5 G/DL (ref 12–16)
HEMOGLOBIN: 8.5 G/DL (ref 12–16)
HYPOCHROMIA: ABNORMAL
LV EF: 80 %
LVEF MODALITY: NORMAL
LYMPHOCYTES ABSOLUTE: 0.4 K/UL (ref 1–4.8)
LYMPHOCYTES RELATIVE PERCENT: 4 %
MACROCYTES: ABNORMAL
MCH RBC QN AUTO: 20.7 PG (ref 27–31.3)
MCHC RBC AUTO-ENTMCNC: 30.2 % (ref 33–37)
MCV RBC AUTO: 68.4 FL (ref 82–100)
MICROCYTES: ABNORMAL
MONOCYTES ABSOLUTE: 0 K/UL (ref 0.2–0.8)
MONOCYTES RELATIVE PERCENT: 2 %
NEUTROPHILS ABSOLUTE: 9.6 K/UL (ref 1.4–6.5)
NEUTROPHILS RELATIVE PERCENT: 96 %
NUCLEATED RED BLOOD CELLS: 1 /100 WBC
PDW BLD-RTO: 25.5 % (ref 11.5–14.5)
PERFORMED ON: ABNORMAL
PLATELET # BLD: 454 K/UL (ref 130–400)
PLATELET SLIDE REVIEW: ABNORMAL
POIKILOCYTES: ABNORMAL
POLYCHROMASIA: ABNORMAL
POTASSIUM REFLEX MAGNESIUM: 4.7 MEQ/L (ref 3.4–4.9)
PROCALCITONIN: 0.05 NG/ML (ref 0–0.15)
RBC # BLD: 3.07 M/UL (ref 4.2–5.4)
SARS-COV-2, NAAT: NOT DETECTED
SODIUM BLD-SCNC: 136 MEQ/L (ref 135–144)
TARGET CELLS: ABNORMAL
WBC # BLD: 10 K/UL (ref 4.8–10.8)

## 2020-06-30 PROCEDURE — 6360000002 HC RX W HCPCS: Performed by: PHYSICIAN ASSISTANT

## 2020-06-30 PROCEDURE — C9113 INJ PANTOPRAZOLE SODIUM, VIA: HCPCS | Performed by: INTERNAL MEDICINE

## 2020-06-30 PROCEDURE — 87899 AGENT NOS ASSAY W/OPTIC: CPT

## 2020-06-30 PROCEDURE — 6360000002 HC RX W HCPCS: Performed by: INTERNAL MEDICINE

## 2020-06-30 PROCEDURE — 85025 COMPLETE CBC W/AUTO DIFF WBC: CPT

## 2020-06-30 PROCEDURE — 85018 HEMOGLOBIN: CPT

## 2020-06-30 PROCEDURE — 85014 HEMATOCRIT: CPT

## 2020-06-30 PROCEDURE — 2700000000 HC OXYGEN THERAPY PER DAY

## 2020-06-30 PROCEDURE — 2580000003 HC RX 258: Performed by: NURSE PRACTITIONER

## 2020-06-30 PROCEDURE — U0002 COVID-19 LAB TEST NON-CDC: HCPCS

## 2020-06-30 PROCEDURE — 6370000000 HC RX 637 (ALT 250 FOR IP): Performed by: INTERNAL MEDICINE

## 2020-06-30 PROCEDURE — 86140 C-REACTIVE PROTEIN: CPT

## 2020-06-30 PROCEDURE — 87449 NOS EACH ORGANISM AG IA: CPT

## 2020-06-30 PROCEDURE — 36430 TRANSFUSION BLD/BLD COMPNT: CPT

## 2020-06-30 PROCEDURE — 93975 VASCULAR STUDY: CPT

## 2020-06-30 PROCEDURE — 2580000003 HC RX 258: Performed by: PHYSICIAN ASSISTANT

## 2020-06-30 PROCEDURE — 97162 PT EVAL MOD COMPLEX 30 MIN: CPT

## 2020-06-30 PROCEDURE — 94761 N-INVAS EAR/PLS OXIMETRY MLT: CPT

## 2020-06-30 PROCEDURE — 2060000000 HC ICU INTERMEDIATE R&B

## 2020-06-30 PROCEDURE — 99233 SBSQ HOSP IP/OBS HIGH 50: CPT | Performed by: INTERNAL MEDICINE

## 2020-06-30 PROCEDURE — 99232 SBSQ HOSP IP/OBS MODERATE 35: CPT | Performed by: NURSE PRACTITIONER

## 2020-06-30 PROCEDURE — 36415 COLL VENOUS BLD VENIPUNCTURE: CPT

## 2020-06-30 PROCEDURE — 94640 AIRWAY INHALATION TREATMENT: CPT

## 2020-06-30 PROCEDURE — 2580000003 HC RX 258: Performed by: INTERNAL MEDICINE

## 2020-06-30 PROCEDURE — 93306 TTE W/DOPPLER COMPLETE: CPT

## 2020-06-30 PROCEDURE — 99232 SBSQ HOSP IP/OBS MODERATE 35: CPT | Performed by: INTERNAL MEDICINE

## 2020-06-30 PROCEDURE — 93975 VASCULAR STUDY: CPT | Performed by: INTERNAL MEDICINE

## 2020-06-30 PROCEDURE — 84145 PROCALCITONIN (PCT): CPT

## 2020-06-30 PROCEDURE — 80048 BASIC METABOLIC PNL TOTAL CA: CPT

## 2020-06-30 RX ORDER — SODIUM CHLORIDE 9 MG/ML
20 INJECTION INTRAVENOUS ONCE
Status: COMPLETED | OUTPATIENT
Start: 2020-06-30 | End: 2020-06-30

## 2020-06-30 RX ORDER — CARVEDILOL 25 MG/1
50 TABLET ORAL 2 TIMES DAILY
Status: DISCONTINUED | OUTPATIENT
Start: 2020-06-30 | End: 2020-07-03 | Stop reason: HOSPADM

## 2020-06-30 RX ADMIN — VERAPAMIL HYDROCHLORIDE 120 MG: 120 TABLET, FILM COATED, EXTENDED RELEASE ORAL at 20:57

## 2020-06-30 RX ADMIN — Medication 10 ML: at 09:56

## 2020-06-30 RX ADMIN — METHYLPREDNISOLONE SODIUM SUCCINATE 40 MG: 40 INJECTION, POWDER, FOR SOLUTION INTRAMUSCULAR; INTRAVENOUS at 07:02

## 2020-06-30 RX ADMIN — Medication 10 ML: at 09:57

## 2020-06-30 RX ADMIN — LEVOFLOXACIN 750 MG: 5 INJECTION, SOLUTION INTRAVENOUS at 12:47

## 2020-06-30 RX ADMIN — METHYLPREDNISOLONE SODIUM SUCCINATE 40 MG: 40 INJECTION, POWDER, FOR SOLUTION INTRAMUSCULAR; INTRAVENOUS at 18:32

## 2020-06-30 RX ADMIN — PANTOPRAZOLE SODIUM 40 MG: 40 INJECTION, POWDER, FOR SOLUTION INTRAVENOUS at 09:56

## 2020-06-30 RX ADMIN — ROSUVASTATIN CALCIUM 40 MG: 20 TABLET, FILM COATED ORAL at 20:57

## 2020-06-30 RX ADMIN — SODIUM CHLORIDE 20 ML: 9 INJECTION, SOLUTION INTRAVENOUS at 10:01

## 2020-06-30 RX ADMIN — PAROXETINE HYDROCHLORIDE 40 MG: 20 TABLET, FILM COATED ORAL at 17:13

## 2020-06-30 RX ADMIN — INSULIN LISPRO 20 UNITS: 100 INJECTION, SUSPENSION SUBCUTANEOUS at 18:30

## 2020-06-30 RX ADMIN — IPRATROPIUM BROMIDE AND ALBUTEROL 1 PUFF: 20; 100 SPRAY, METERED RESPIRATORY (INHALATION) at 20:35

## 2020-06-30 RX ADMIN — CARVEDILOL 50 MG: 25 TABLET, FILM COATED ORAL at 20:57

## 2020-06-30 RX ADMIN — HYDRALAZINE HYDROCHLORIDE 100 MG: 100 TABLET, FILM COATED ORAL at 17:13

## 2020-06-30 RX ADMIN — IPRATROPIUM BROMIDE AND ALBUTEROL 1 PUFF: 20; 100 SPRAY, METERED RESPIRATORY (INHALATION) at 07:14

## 2020-06-30 RX ADMIN — PANTOPRAZOLE SODIUM 40 MG: 40 INJECTION, POWDER, FOR SOLUTION INTRAVENOUS at 20:57

## 2020-06-30 RX ADMIN — METHYLPREDNISOLONE SODIUM SUCCINATE 40 MG: 40 INJECTION, POWDER, FOR SOLUTION INTRAMUSCULAR; INTRAVENOUS at 12:46

## 2020-06-30 RX ADMIN — FUROSEMIDE 40 MG: 10 INJECTION, SOLUTION INTRAMUSCULAR; INTRAVENOUS at 09:59

## 2020-06-30 RX ADMIN — DIGOXIN 125 MCG: 125 TABLET ORAL at 17:13

## 2020-06-30 RX ADMIN — Medication 10 ML: at 20:58

## 2020-06-30 RX ADMIN — FUROSEMIDE 40 MG: 10 INJECTION, SOLUTION INTRAMUSCULAR; INTRAVENOUS at 18:32

## 2020-06-30 RX ADMIN — SODIUM CHLORIDE 20 ML: 9 INJECTION INTRAMUSCULAR; INTRAVENOUS; SUBCUTANEOUS at 10:02

## 2020-06-30 RX ADMIN — IPRATROPIUM BROMIDE AND ALBUTEROL 1 PUFF: 20; 100 SPRAY, METERED RESPIRATORY (INHALATION) at 13:05

## 2020-06-30 RX ADMIN — INSULIN LISPRO 5 UNITS: 100 INJECTION, SOLUTION INTRAVENOUS; SUBCUTANEOUS at 13:32

## 2020-06-30 ASSESSMENT — ENCOUNTER SYMPTOMS
BLOOD IN STOOL: 0
CHEST TIGHTNESS: 0
COUGH: 0
STRIDOR: 0
NAUSEA: 0
SHORTNESS OF BREATH: 1
GASTROINTESTINAL NEGATIVE: 1
EYES NEGATIVE: 1
WHEEZING: 0

## 2020-06-30 NOTE — PROGRESS NOTES
Department of Internal Medicine  General Internal Medicine  Attending Progress Note      SUBJECTIVE:  Pt seen and examined. Precautions removed after 2 negative covid tests. Pt feels much better today. Plan for endoscopy tomorrow.      OBJECTIVE      Medications    Current Facility-Administered Medications: carvedilol (COREG) tablet 50 mg, 50 mg, Oral, BID  verapamil (CALAN SR) extended release tablet 120 mg, 120 mg, Oral, Nightly  sodium chloride flush 0.9 % injection 10 mL, 10 mL, Intravenous, 2 times per day  sodium chloride flush 0.9 % injection 10 mL, 10 mL, Intravenous, PRN  acetaminophen (TYLENOL) tablet 650 mg, 650 mg, Oral, Q6H PRN **OR** acetaminophen (TYLENOL) suppository 650 mg, 650 mg, Rectal, Q6H PRN  polyethylene glycol (GLYCOLAX) packet 17 g, 17 g, Oral, Daily PRN  promethazine (PHENERGAN) tablet 12.5 mg, 12.5 mg, Oral, Q6H PRN **OR** ondansetron (ZOFRAN) injection 4 mg, 4 mg, Intravenous, Q6H PRN  methylPREDNISolone sodium (SOLU-MEDROL) injection 40 mg, 40 mg, Intravenous, Q6H **FOLLOWED BY** [START ON 7/1/2020] predniSONE (DELTASONE) tablet 40 mg, 40 mg, Oral, Daily  0.9 % sodium chloride bolus, 20 mL, Intravenous, Once  albuterol-ipratropium (COMBIVENT RESPIMAT)  MCG/ACT inhaler 1 puff, 1 puff, Inhalation, TID  nicotine (NICODERM CQ) 14 MG/24HR 1 patch, 1 patch, Transdermal, Daily  digoxin (LANOXIN) tablet 125 mcg, 125 mcg, Oral, Daily  PARoxetine (PAXIL) tablet 40 mg, 40 mg, Oral, Daily  rosuvastatin (CRESTOR) tablet 40 mg, 40 mg, Oral, QPM  glucose (GLUTOSE) 40 % oral gel 15 g, 15 g, Oral, PRN  dextrose 50 % IV solution, 12.5 g, Intravenous, PRN  glucagon (rDNA) injection 1 mg, 1 mg, Intramuscular, PRN  dextrose 5 % solution, 100 mL/hr, Intravenous, PRN  hydrALAZINE (APRESOLINE) tablet 100 mg, 100 mg, Oral, BID  pantoprazole (PROTONIX) injection 40 mg, 40 mg, Intravenous, BID **AND** sodium chloride (PF) 0.9 % injection 10 mL, 10 mL, Intravenous, Daily  insulin lispro (HUMALOG) injection vial 0-6 Units, 0-6 Units, Subcutaneous, Q6H  insulin lispro protamine & lispro (HUMALOG MIX) (75-25) 100 UNIT per ML injection vial SUSP 20 Units, 20 Units, Subcutaneous, BID   furosemide (LASIX) injection 40 mg, 40 mg, Intravenous, BID  Physical    VITALS:  BP (!) 135/56   Pulse 91   Temp 97.9 °F (36.6 °C)   Resp 16   Ht 4' 11\" (1.499 m)   Wt 125 lb 8 oz (56.9 kg)   LMP  (LMP Unknown)   SpO2 100%   BMI 25.35 kg/m²   Constitutional: Awake and alert in no acute distress. Lying in bed comfortably  Head: Normocephalic, atraumatic  Eyes: EOMI, PERRLA  ENT: moist mucous membranes, nasal cannula in place  Neck: neck supple, trachea midline  Lungs: Good inspiratory effort, no wheeze, bibasilar crackles  Heart: RRR, normal S1 and S2, no murmurs  GI: Soft, non-distended, non tender, no guarding, no rebound, +BS  MSK: no edema noted  Pulses: 2+ pulses bilaterally  Skin: warm, dry  Psych: appropriate affect     Data    CBC:   Lab Results   Component Value Date    WBC 10.0 06/30/2020    RBC 3.07 06/30/2020    HGB 6.7 06/30/2020    HCT 22.7 06/30/2020    MCV 68.4 06/30/2020    MCH 20.7 06/30/2020    MCHC 30.2 06/30/2020    RDW 25.5 06/30/2020     06/30/2020    MPV 8.8 08/01/2015     CMP:    Lab Results   Component Value Date     06/30/2020    K 4.7 06/30/2020     06/30/2020    CO2 17 06/30/2020    BUN 44 06/30/2020    CREATININE 1.49 06/30/2020    GFRAA 41.2 06/30/2020    LABGLOM 34.0 06/30/2020    GLUCOSE 360 06/30/2020    PROT 6.7 06/29/2020    LABALBU 4.1 06/29/2020    CALCIUM 8.9 06/30/2020    BILITOT 0.3 06/29/2020    ALKPHOS 105 06/29/2020    AST 44 06/29/2020    ALT 22 06/29/2020       ASSESSMENT AND PLAN      # Acute hypoxic respiratory failure 2/2 acute on chronic diastolic CHF  - CXR with pulmonary edema  - initially hypoxic in the ED and placed on BiPAP.  Now on nasal cannula - will wean as tolerated  - procalcitonin negative  - COVID negative x2  - diuresing with IV Lasix   - cardiology consulted - increased coreg and added verapamil for better HR control  - will decrease steroids to PO daily - no wheeze, no hypercapnia    # Elevated troponin  - demand ischemia 2/2 CHF  - cardiology consulted    # Acute on chronic blood loss anemia  - has been diagnosed with AVMs and antral ulcer earlier this year  - sp 3U blood. Hgb to be rechecked. Will transfuse <7  - GI consulted - plan for endoscopy tomorrow. NPO after midnight  - PPI BID    # CKD3  - baseline normal  - will monitor with diuresis    Disposition: Monitoring Hgb. Endoscopy in the AM. Continue to wean O2. PT evaluated and pt has no needs and independent. Will discharge home when Hgb stable and O2 weaned down.       Luis Medina, DO  Internal Medicine

## 2020-06-30 NOTE — PROGRESS NOTES
to AVMs, has not been on anticoagulation    PLAN :  1. Continue IV PPI BID  2. Continue course of care, trend H&H, transfuse to keep hemoglobin greater than 7  3. Endoscopic evaluation pending COVID-19 testing today and clinical course, will most likely need push enteroscopy and possible follow up OP colonoscopy and small bowel pill capsule endoscopy. Thank you for allowing me to participate in the care of your patient. Please feel free to contact me with any concerns.     Mónica Anderson, JOHNATHON - CNP

## 2020-06-30 NOTE — CARE COORDINATION
[facility] [place] Case Management Initial Discharge Assessment    The LSW met with the patient to discuss the discharge plan. PCP: Ashley Mcneil MD                                Date of Last Visit:     If no PCP, list provided? N/A    Discharge Planning    Living Arrangements: independently at home    Who do you live with? Spouse    Who helps you with your care:  self    If lives at home:     Do you have any barriers navigating in your home? no    Patient can perform ADL? Yes    Current Services (outpatient and in home) :  None    Dialysis: No    Is transportation available to get to your appointments? Yes - patient's  (patient states she drives, but hasn't driven recently)    DME Equipment:  [employer] [place], cane and wheelchair    Respiratory equipment: Yes - O2 as needed    Respiratory provider:  Patient is not sure which company     Pharmacy:  [facility] Consult with Medication Assistance Program?  No      Patient agreeable to [place] [address]? Yes - if needed; would like [facility] ([place] of choice was discussed)    Patient agreeable to SNF/Rehab? N/A    Other discharge needs identified? Freedom of choice list provided with basic dialogue that supports the patient's individualized plan of care/goals and shares the quality data associated with the providers. Yes     Does Patient Have a High-Risk for Readmission Diagnosis (CHF, PN, MI, COPD)? The plan for Transition of Care is related to the following treatment goals: Await for the patient to be medically stable. Patient is now out of isolation. Initial Discharge Plan? (Note: please see concurrent daily documentation for any updates after initial note). Physical therapy states the patient is ok for home. The patient is agrees to home with [facility] if needed. The patient would like a RN at least 1x a week. The Patient and/or patient representative:  The patient was provided with choice of any post-acute providers for care and equipment and agrees with discharge plan  Yes    DCCOP was completed. The patient states she does not have a HCPOA. The patient states does not want to talk with the  at this time regarding advanced directives. An ACP was completed. The patient is a medium risk for readmission (yellow).     Electronically signed by Kenn Long on 6/30/2020 at 2:58 PM

## 2020-06-30 NOTE — PROGRESS NOTES
Infectious Diseases Inpatient Progress Note          HISTORY OF PRESENT ILLNESS:  Follow up CHF/COPD on Levaquin, well tolerated. Patient had remarkable clinical improvement with decreased SOB, no cough. No fevers. Current Medications:     carvedilol  50 mg Oral BID    verapamil  120 mg Oral Nightly    sodium chloride flush  10 mL Intravenous 2 times per day    methylPREDNISolone  40 mg Intravenous Q6H    Followed by   Tej Jose ON 7/1/2020] predniSONE  40 mg Oral Daily    sodium chloride  20 mL Intravenous Once    albuterol-ipratropium  1 puff Inhalation TID    nicotine  1 patch Transdermal Daily    digoxin  125 mcg Oral Daily    PARoxetine  40 mg Oral Daily    rosuvastatin  40 mg Oral QPM    hydrALAZINE  100 mg Oral BID    pantoprazole  40 mg Intravenous BID    And    sodium chloride (PF)  10 mL Intravenous Daily    insulin lispro  0-6 Units Subcutaneous Q6H    insulin lispro protamine & lispro  20 Units Subcutaneous BID WC    furosemide  40 mg Intravenous BID       Allergies:  Ibuprofen; Metformin and related; and Darvon [propoxyphene hcl]      Review of Systems  14 system review is negative other than HPI    Physical Exam   Vitals:    06/30/20 0715 06/30/20 0814 06/30/20 1151 06/30/20 1306   BP:  136/74 (!) 135/56    Pulse: 93 94 86 91   Resp: 18   16   Temp:  97.3 °F (36.3 °C) 97.9 °F (36.6 °C)    TempSrc:  Oral     SpO2: 100% 100% 100% 100%   Weight:       Height:           General Appearance: alert and oriented to person, place and time, well-developed and well-nourished, in no acute distress  On 3 lit NC  Skin: warm and dry, no rash. Head: normocephalic and atraumatic  Eyes: extraocular eye movements intact, conjunctivae normal, anicteric sclerae  ENT: oropharynx clear and moist with normal mucous membranes.  No thrush  Lungs: normal respiratory effort, diminished BS, no wheezes  Heart:RRR, nl S1/S2, no murmur  Abdomen: soft, no tenderness, no H-S-megaly, + BS  NEUROLOGICAL: alert and oriented x 3, no focal deficits  No leg edema  No erythema, no warmth, no tenderness    DATA:    Lab Results   Component Value Date    WBC 10.0 06/30/2020    HGB 6.7 (LL) 06/30/2020    HCT 22.7 (L) 06/30/2020    MCV 68.4 (L) 06/30/2020     (H) 06/30/2020     Lab Results   Component Value Date    CREATININE 1.49 (H) 06/30/2020    BUN 44 (H) 06/30/2020     06/30/2020    K 4.7 06/30/2020     06/30/2020    CO2 17 (L) 06/30/2020       Hepatic Function Panel:  Lab Results   Component Value Date    ALKPHOS 105 06/29/2020    ALT 22 06/29/2020    AST 44 06/29/2020    PROT 6.7 06/29/2020    BILITOT 0.3 06/29/2020    BILIDIR <0.2 01/19/2019    IBILI see below 01/19/2019    LABALBU 4.1 06/29/2020       Microbiology:   Recent Labs     06/29/20  0727   BC No Growth to date. Any change in status will be called. Recent Labs     06/29/20  0727   BLOODCULT2 No Growth to date. Any change in status will be called.        IMPRESSION:      · Acute CHF exacerbation secondary to hypertrophic cardiomyopathy  · COPD  · Acute and chronic respiratory failure  · No evidence for infection, normal procalcitonin    Patient Active Problem List   Diagnosis    Hypertension    Hyperlipidemia    Uncontrolled type 2 diabetes mellitus with complication, without long-term current use of insulin (HCC)    Fibromyalgia    Anxiety    Smoker    Insomnia    DJD (degenerative joint disease), lumbar    Lumbosacral radiculopathy at S1    DDD (degenerative disc disease), lumbar    Dysphonia    CTS (carpal tunnel syndrome)    High risk medication use-Norco - 12/20/17 OARRS PM&R, 02/20/18 OARRS PM&R, 03/07/18 OARRS PM&R, Urine Drug screen negative 02/06/17 PM&R--MED CONTRACT 2/6/17    SOB (shortness of breath) on exertion    Chest pain    Memory deficit    Artificial lens present    Presbyopia    Astigmatism, regular    Cataract, nuclear sclerotic senile    IDDM (insulin dependent diabetes mellitus) [facility]    Regular astigmatism    Nuclear senile cataract    Neck pain    Lumbosacral radiculopathy at L5    Spinal stenosis of lumbar region with neurogenic claudication    Impaired mobility and activities of daily living    Non-compliant patient NO showed FU 1/10/17 Dr Blair Jauregui Insomnia secondary to chronic pain    Reactive depression    Diabetic radiculopathy (HCC)    Cervical radicular pain    Diabetic asymmetric polyneuropathy (HCC)    Myalgia    Intercostal neuropathy    Osteoarthritis of spine with radiculopathy, lumbar region    Acute combined systolic and diastolic CHF, NYHA class 1 ([facility])    PMB (postmenopausal bleeding)    Acute on chronic diastolic heart failure (HCC)    CHF (congestive heart failure) ([facility])    Hypertensive urgency    Uncontrolled type 2 diabetes mellitus with hyperglycemia ([facility])    Chronic obstructive pulmonary disease with acute exacerbation (HCC)    Acute on chronic diastolic (congestive) heart failure ([address]    SAÚL (acute kidney injury) ([address]    Hypoglycemia    HOCM (hypertrophic obstructive cardiomyopathy) ([facility])    Gastrointestinal hemorrhage    COPD exacerbation ([facility])    Anemia    AVM (arteriovenous malformation)       PLAN:  · D/C Levaquin  · May discharge when OK with other DOCS    Discussed with patient    Giovanna Fajardo MD

## 2020-06-30 NOTE — PROGRESS NOTES
Progress Note  Patient: Saritha Ayers  Unit/Bed: F716/W849-59  YOB: 1945  MRN: [id number]  Acct: [de-identified]   Admitting Diagnosis: COPD exacerbation ([facility] [J44.1]  Admit Date:  6/29/2020  Hospital Day: 1    Chief Complaint: SOB HF HTN     Histories:  Past Medical History:   Diagnosis Date    Anxiety     Asthma     dx 2019 / has smoked since age 14    CH[facility]congest[facility] heart failure) ([facility]     Chronic back pain     Bilateral L5 S1 Radic on emg--surprisingly worse on the left than the right--pt's symptoms and her MRI show worse on the right    Chronic obstructive pulmonary disease with acute exacerbation ([facility] 10/12/2019    Depression     Fibromyalgia     Hyperlipidemia     meds > 8 yrs    Hypertension     meds > 45 yrs    Osteoarthritis     Type II diabetes mellitus, uncontrolled ([facility]     hx > 8 yrs    Unspecified sleep apnea      Past Surgical History:   Procedure Laterality Date    BACK SURGERY  2017    lumbar disc    CARDIAC CATHETERIZATION  11/3/14     DR. MIRELES / no stents    COLONOSCOPY  08/29/2016    w/polypectomy     DILATION AND CURETTAGE OF UTERUS N/A 10/7/2019    EUA HYSTEROSCOPY DILATATION AND CURETTAGE performed by Jose Powell DO at [facility]. [address], COLON, DIAGNOSTIC      EYE SURGERY      [place] with IOL OU / [address]    umbilical hernia repair    MS ESOPHAGOGASTRODUODENOSCOPY TRANSORAL DIAGNOSTIC N/A 3/24/2017    EGD ESOPHAGOGASTRODUODENOSCOPY performed by Eulice Primrose, MD at [facility] N/A 2/8/2018    negative findings    MS REVISE MEDIAN N/CARPAL TUNNEL SURG Left 6/5/2017    LEFT  CARPAL TUNNEL RELEASE performed by Armando Chow MD at [facility]W,[facility] N/A 2/8/2018    TONSILLECTOMY      as child    UPPER GASTROINTESTINAL ENDOSCOPY  08/26/2016    w/bx     UPPER GASTROINTESTINAL ENDOSCOPY N/A 2/25/2020    EGD possible biopsy performed by Debby Valadez MD at [address] History   Problem Relation Age of Onset    Heart Disease Father         cardiac bypass    Arthritis Father     Arthritis Mother     Other Mother          at age 80    Other Sister         nuknown health hx    No Known Problems Daughter     Stroke Son      Social History     Socioeconomic History    Marital status:      Spouse name: None    Number of children: None    Years of education: None    Highest education level: None   Occupational History    Occupation: Retired-   Social Needs    Financial resource strain: None    Food insecurity     Worry: None     Inability: None    Transportation needs     Medical: None     Non-medical: None   Tobacco Use    Smoking status: Former Smoker     Packs/day: 1.00     Years: 59.00     Pack years: 59.00     Types: Cigarettes     Start date: 2017    Smokeless tobacco: Never Used    Tobacco comment: quit 2-3 weeks ago    Substance and Sexual Activity    Alcohol use: Not Currently    Drug use: No    Sexual activity: Yes   Lifestyle    Physical activity     Days per week: None     Minutes per session: None    Stress: None   Relationships    Social connections     Talks on phone: None     Gets together: None     Attends Druze service: None     Active member of club or organization: None     Attends meetings of clubs or organizations: None     Relationship status: None    Intimate partner violence     Fear of current or ex partner: None     Emotionally abused: None     Physically abused: None     Forced sexual activity: None   Other Topics Concern    None   Social History Narrative    None       Subjective/HPI remains remarkably HTN despite med adjustment yesterday. Renal Duplex in -P. HR also remains elevated. She feels better. Made lots of urine overnight. She is partially incontinent and therefore I/Os not reflective.      EKG: SR         Review of Systems:   Review of Systems   Constitutional: Negative. Negative for diaphoresis and fatigue. HENT: Negative. Eyes: Negative. Respiratory: Positive for shortness of breath. Negative for cough, chest tightness, wheezing and stridor. Cardiovascular: Negative. Negative for chest pain, palpitations and leg swelling. Gastrointestinal: Negative. Negative for blood in stool and nausea. Genitourinary: Negative. Musculoskeletal: Negative. Skin: Negative. Neurological: Negative. Negative for dizziness, syncope, weakness and light-headedness. Hematological: Negative. Psychiatric/Behavioral: Negative. Physical Examination:    BP (!) 175/61   Pulse 102   Temp 98.1 °F (36.7 °C) (Oral)   Resp 18   Ht 4' 11\" (1.499 m)   Wt 125 lb (56.7 kg)   LMP  (LMP Unknown)   SpO2 100%   BMI 25.25 kg/m²    Physical Exam   Constitutional: She appears healthy. No distress. HENT:   Normal cephalic and Atraumatic   Eyes: Pupils are equal, round, and reactive to light. Neck: Normal range of motion and thyroid normal. Neck supple. No JVD present. No neck adenopathy. No thyromegaly present. Cardiovascular: Normal rate, regular rhythm, intact distal pulses and normal pulses. Murmur heard. Pulmonary/Chest: Effort normal and breath sounds normal. She has no wheezes. She has no rales. She exhibits no tenderness. Abdominal: Soft. Bowel sounds are normal. There is no abdominal tenderness. Musculoskeletal: Normal range of motion. General: No tenderness or edema. Neurological: She is alert and oriented to person, place, and time. Skin: Skin is warm. No cyanosis. Nails show no clubbing.        LABS:  CBC:   Lab Results   Component Value Date    WBC 8.0 06/29/2020    RBC 3.09 06/29/2020    HGB 7.3 06/29/2020    HCT 24.1 06/29/2020    MCV 62.4 06/29/2020    MCH 18.3 06/29/2020    MCHC 29.3 06/29/2020    RDW 20.4 06/29/2020     06/29/2020    MPV 8.8 08/01/2015     CBC with Differential:    Lab Results Electronically signed by Palak Bowen MD on 6/30/2020 at 7:06 AM

## 2020-06-30 NOTE — ACP (ADVANCE CARE PLANNING)
and suddenly became very ill and were unable to breathe on your own, what would your preference be about the use of a ventilator (breathing machine) if it were available to you? \"      Would the patient desire the use of ventilator (breathing machine)?: yes    \"If your health worsens and it becomes clear that your chance of recovery is unlikely, what would your preference be about the use of a ventilator (breathing machine) if it were available to you? \"     Would the patient desire the use of ventilator (breathing machine)?: No - patient states she would not want ventilator for long term - (short term yes)      Resuscitation  \"CPR works best to restart the heart when there is a sudden event, like a heart attack, in someone who is otherwise healthy. Unfortunately, CPR does not typically restart the heart for people who have serious health conditions or who are very sick. \"    \"In the event your heart stopped as a result of an underlying serious health condition, would you want attempts to be made to restart your heart (answer \"yes\" for attempt to resuscitate) or would you prefer a natural death (answer \"no\" for do not attempt to resuscitate)? \" yes - Patient would want CPR      NOTE: If the patient has a valid advance directive AND now provides care preference(s) that are inconsistent with that prior directive, advise the patient to consider either: creating a new advance directive that complies with state-specific requirements; or, if that is not possible, orally revoking that prior directive in accordance with state-specific requirements, which must be documented in the EHR. [x] Yes   [] No   Educated Patient / Lucas Mattson regarding differences between Advance Directives and portable DNR orders.     Length of ACP Conversation in minutes:      Conversation Outcomes:  [x] ACP discussion completed  [] Existing advance directive reviewed with patient; no changes to patient's previously recorded wishes  [] New Advance Directive completed  [] Portable Do Not Rescitate prepared for Provider review and signature  [] POLST/POST/MOLST/MOST prepared for Provider review and signature      Follow-up plan:    [] Schedule follow-up conversation to continue planning  [] Referred individual to Provider for additional questions/concerns   [] Advised patient/agent/surrogate to review completed ACP document and update if needed with changes in condition, patient preferences or care setting    [x] This note routed to one or more involved healthcare providers

## 2020-06-30 NOTE — FLOWSHEET NOTE
1400- patient out of covid isolation per dr. Elmer Rodriguez, second test negative    1410- plan for enteroscopy with dr. Soumya Lopez tomorrow, diet resumed. PT working with the patient.

## 2020-06-30 NOTE — PROGRESS NOTES
Physical Therapy Med Surg Initial Assessment  Facility/Department: [address]  [facility]       NAME: Milli Merchant  : 1945 (75 y.o.)  MRN: [id number]  CODE STATUS: Full Code    Date of Service: 2020    Patient Diagnosis(es): COPD exacerbation [facility]) [J44.1]   Chief Complaint   Patient presents with    Shortness of Breath     Patient Active Problem List    Diagnosis Date Noted    Lumbosacral radiculopathy at L5 2015     Priority: High     Class: Acute    Spinal stenosis of lumbar region with neurogenic claudication 2015     Priority: High     Class: Chronic    High risk medication use-Norco - 17 OARRS PM&R, 18 OARRS PM&R, 18 OARRS PM&R, Urine Drug screen negative 17 PM&R--MED CONTRACT 2014     Priority: High    COPD exacerbation ([address] 2020    Anemia     AVM (arteriovenous malformation)     Gastrointestinal hemorrhage 2020    HOCM (hypertrophic obstructive cardiomyopathy) ([address] 2019    Hypoglycemia     SAÚL (acute kidney injury) ([address] 10/23/2019    Acute on chronic diastolic (congestive) heart failure ([address] 10/13/2019    Chronic obstructive pulmonary disease with acute exacerbation ([address] 10/12/2019    Hypertensive urgency 10/09/2019    Uncontrolled type 2 diabetes mellitus with hyperglycemia (HCC)     Acute on chronic diastolic heart failure ([address] 10/08/2019    CHF (congestive heart failure) (HCC)     PMB (postmenopausal bleeding)     Acute combined systolic and diastolic CHF, NYHA class 1 ([address] 2019    Osteoarthritis of spine with radiculopathy, lumbar region 2018    Myalgia 2018    Intercostal neuropathy 2018    Diabetic asymmetric polyneuropathy ([address] 2017    Cervical radicular pain 2016    Reactive depression 07/15/2016    Diabetic radiculopathy ([address] 07/15/2016    Insomnia secondary to chronic pain 04/15/2016    Non-compliant patient NO showed FU 1/10/17   EYE SURGERY      [place] with IOL OU / [address]  [address]    umbilical hernia repair    [place] ESOPHAGOGASTRODUODENOSCOPY TRANSORAL DIAGNOSTIC N/A 3/24/2017    EGD ESOPHAGOGASTRODUODENOSCOPY performed by Shreya Javier MD at [facility] N/A 2/8/2018    negative findings    [place] REVISE MEDIAN N/CARPAL TUNNEL SURG Left 6/5/2017    LEFT  CARPAL TUNNEL RELEASE performed by Johnna Hastings MD at [facility] RZTY,8+Y/H,CRQET N/A 2/8/2018    TONSILLECTOMY      as child    UPPER GASTROINTESTINAL ENDOSCOPY  08/26/2016    w/bx     UPPER GASTROINTESTINAL ENDOSCOPY N/A 2/25/2020    EGD possible biopsy performed by Laury Ba MD at [facility]       Chart Reviewed: Yes  Family / Caregiver Present: No  General Comment  Comments: Pt resting in bed - agreeable to PT evaluation    Restrictions:  Restrictions/Precautions: Fall Risk     SUBJECTIVE: Subjective: \"Don't mess with my food. \"    Pain  Pre Treatment Pain Screening  Comments / Details: denies    Post Treatment Pain Screening:   Pain Assessment  Pain Assessment: (denies)    Prior Level of Function:  Social/Functional History  Lives With: Spouse  Type of Home: House  Home Layout: Two level, Laundry in basement  Home Access: Stairs to enter with rails  Entrance Stairs - Number of Steps: 5  ADL Assistance: Independent  Homemaking Assistance: Independent  Ambulation Assistance: Independent(without AD)  Transfer Assistance: Independent    OBJECTIVE:   Vision: Within Functional Limits  Hearing: Within functional limits    Cognition:  Overall Orientation Status: Within Functional Limits  Follows Commands: Within Functional Limits    Observation/Palpation  Observation: No acute distress noted. Pleasant and motivated.      ROM:  RLE PROM: WFL  LLE PROM: WFL    Strength:  Strength RLE  Strength RLE: WFL  Strength LLE  Strength LLE: WFL    Neuro:  Balance  Comments: Retrieves object from floor without difficulty     Motor Control  Gross Motor?: WFL  Sensation  Overall Sensation Status: WFL    Bed mobility  Rolling to Left: Modified independent  Rolling to Right: Modified independent  Supine to Sit: Modified independent  Sit to Supine: Modified independent    Transfers  Sit to Stand: Modified independent  Stand to sit: Modified independent  Bed to Chair: Modified independent    Ambulation 1  Device: No Device  Assistance: Modified Independent  Quality of Gait: normalized  Distance: 25ft X 2    Stairs/Curb  Stairs?: No         Activity Tolerance  Activity Tolerance: Patient Tolerated treatment well          PT Education  PT Education: PT Role    ASSESSMENT:   Decision Making: Low Complexity  History: Med  Exam: Med  Clinical Presentation: Low  No Skilled PT: Safe to return home    Prognosis: Good  Barriers to Learning: none at this time    DISCHARGE RECOMMENDATIONS:  Discharge Recommendations: Home independently  No Skilled PT: Safe to return home    Assessment: Pt completes basic mobility at indep level of function. Denies further PT needs at home. Confident that her spouse will be able to assist PRN if needed. No further in house PT indicated. REQUIRES PT FOLLOW UP: No      PLAN OF CARE:  Plan  Times per week: eval only  Safety Devices  Type of devices: All fall risk precautions in place    Goals:  Short term goals  Short term goal 1: NA    [facility] (6 CLICK) BASIC MOBILITY  AM-PAC Inpatient Mobility Raw Score : 24     Therapy Time:   Individual   Time In 1420   Time Out 1430   Minutes 10           Hilda Haro, [facility], 06/30/20 at 4:21 PM         Definitions for assistance levels  Independent = pt does not require any physical supervision or assistance from another person for activity completion. Device may be needed.   Stand by assistance = pt requires verbal cues or instructions from another person, close to but not touching, to perform the activity  Minimal assistance= pt performs 75% or more of the activity; assistance is required to complete the activity  Moderate assistance= pt performs 50% of the activity; assistance is required to complete the activity  Maximal assistance = pt performs 25% of the activity; assistance is required to complete the activity  Dependent = pt requires total physical assistance to accomplish the task

## 2020-06-30 NOTE — FLOWSHEET NOTE
Patient assessment completed earlier this shift. The patient is on 02 denies shortness of breath, but has shortness of breath on exertion, She is in a negative pressure room an in droplet isolation. The patient is alert and oriented. She was incontinent of a large amount of urine. Had a complete bed change and bed bath.

## 2020-07-01 ENCOUNTER — ANESTHESIA (OUTPATIENT)
Dept: ENDOSCOPY | Age: 75
DRG: 291 | End: 2020-07-01
Payer: MEDICARE

## 2020-07-01 ENCOUNTER — ANCILLARY PROCEDURE (OUTPATIENT)
Dept: ENDOSCOPY | Age: 75
DRG: 291 | End: 2020-07-01
Payer: MEDICARE

## 2020-07-01 ENCOUNTER — ANESTHESIA EVENT (OUTPATIENT)
Dept: ENDOSCOPY | Age: 75
DRG: 291 | End: 2020-07-01
Payer: MEDICARE

## 2020-07-01 VITALS
DIASTOLIC BLOOD PRESSURE: 59 MMHG | OXYGEN SATURATION: 100 % | RESPIRATION RATE: 23 BRPM | SYSTOLIC BLOOD PRESSURE: 119 MMHG

## 2020-07-01 LAB
ANION GAP SERPL CALCULATED.3IONS-SCNC: 17 MEQ/L (ref 9–15)
BASOPHILS ABSOLUTE: 0 K/UL (ref 0–0.2)
BASOPHILS RELATIVE PERCENT: 0.1 %
BUN BLDV-MCNC: 61 MG/DL (ref 8–23)
CALCIUM SERPL-MCNC: 9.1 MG/DL (ref 8.5–9.9)
CHLORIDE BLD-SCNC: 100 MEQ/L (ref 95–107)
CO2: 18 MEQ/L (ref 20–31)
CREAT SERPL-MCNC: 1.92 MG/DL (ref 0.5–0.9)
EOSINOPHILS ABSOLUTE: 0 K/UL (ref 0–0.7)
EOSINOPHILS RELATIVE PERCENT: 0 %
GFR AFRICAN AMERICAN: 30.7
GFR NON-AFRICAN AMERICAN: 25.4
GLUCOSE BLD-MCNC: 225 MG/DL (ref 60–115)
GLUCOSE BLD-MCNC: 309 MG/DL (ref 60–115)
GLUCOSE BLD-MCNC: 329 MG/DL (ref 60–115)
GLUCOSE BLD-MCNC: 362 MG/DL (ref 60–115)
GLUCOSE BLD-MCNC: 397 MG/DL (ref 60–115)
GLUCOSE BLD-MCNC: 422 MG/DL (ref 70–99)
GLUCOSE BLD-MCNC: 550 MG/DL (ref 60–115)
HCT VFR BLD CALC: 26.4 % (ref 37–47)
HCT VFR BLD CALC: 27.3 % (ref 37–47)
HCT VFR BLD CALC: 27.3 % (ref 37–47)
HCT VFR BLD CALC: 28.5 % (ref 37–47)
HEMOGLOBIN: 8 G/DL (ref 12–16)
HEMOGLOBIN: 8.1 G/DL (ref 12–16)
HEMOGLOBIN: 8.3 G/DL (ref 12–16)
HEMOGLOBIN: 8.5 G/DL (ref 12–16)
LYMPHOCYTES ABSOLUTE: 0.2 K/UL (ref 1–4.8)
LYMPHOCYTES RELATIVE PERCENT: 1.9 %
MCH RBC QN AUTO: 21.3 PG (ref 27–31.3)
MCHC RBC AUTO-ENTMCNC: 30.2 % (ref 33–37)
MCV RBC AUTO: 70.7 FL (ref 82–100)
MONOCYTES ABSOLUTE: 0.6 K/UL (ref 0.2–0.8)
MONOCYTES RELATIVE PERCENT: 4.7 %
NEUTROPHILS ABSOLUTE: 11.3 K/UL (ref 1.4–6.5)
NEUTROPHILS RELATIVE PERCENT: 93.3 %
PDW BLD-RTO: 26.5 % (ref 11.5–14.5)
PERFORMED ON: ABNORMAL
PLATELET # BLD: 413 K/UL (ref 130–400)
POTASSIUM REFLEX MAGNESIUM: 4.4 MEQ/L (ref 3.4–4.9)
RBC # BLD: 3.73 M/UL (ref 4.2–5.4)
SODIUM BLD-SCNC: 135 MEQ/L (ref 135–144)
WBC # BLD: 12.1 K/UL (ref 4.8–10.8)

## 2020-07-01 PROCEDURE — C9113 INJ PANTOPRAZOLE SODIUM, VIA: HCPCS | Performed by: INTERNAL MEDICINE

## 2020-07-01 PROCEDURE — 2580000003 HC RX 258: Performed by: PHYSICIAN ASSISTANT

## 2020-07-01 PROCEDURE — 6370000000 HC RX 637 (ALT 250 FOR IP): Performed by: INTERNAL MEDICINE

## 2020-07-01 PROCEDURE — 2580000003 HC RX 258: Performed by: INTERNAL MEDICINE

## 2020-07-01 PROCEDURE — 2500000003 HC RX 250 WO HCPCS: Performed by: NURSE ANESTHETIST, CERTIFIED REGISTERED

## 2020-07-01 PROCEDURE — 94640 AIRWAY INHALATION TREATMENT: CPT

## 2020-07-01 PROCEDURE — 44360 SMALL BOWEL ENDOSCOPY: CPT | Performed by: INTERNAL MEDICINE

## 2020-07-01 PROCEDURE — 7100000011 HC PHASE II RECOVERY - ADDTL 15 MIN: Performed by: INTERNAL MEDICINE

## 2020-07-01 PROCEDURE — 85014 HEMATOCRIT: CPT

## 2020-07-01 PROCEDURE — 6360000002 HC RX W HCPCS: Performed by: INTERNAL MEDICINE

## 2020-07-01 PROCEDURE — 3700000001 HC ADD 15 MINUTES (ANESTHESIA): Performed by: INTERNAL MEDICINE

## 2020-07-01 PROCEDURE — 7100000010 HC PHASE II RECOVERY - FIRST 15 MIN: Performed by: INTERNAL MEDICINE

## 2020-07-01 PROCEDURE — 85025 COMPLETE CBC W/AUTO DIFF WBC: CPT

## 2020-07-01 PROCEDURE — 99233 SBSQ HOSP IP/OBS HIGH 50: CPT | Performed by: INTERNAL MEDICINE

## 2020-07-01 PROCEDURE — 6360000002 HC RX W HCPCS: Performed by: PHYSICIAN ASSISTANT

## 2020-07-01 PROCEDURE — 6370000000 HC RX 637 (ALT 250 FOR IP): Performed by: PHYSICIAN ASSISTANT

## 2020-07-01 PROCEDURE — 80048 BASIC METABOLIC PNL TOTAL CA: CPT

## 2020-07-01 PROCEDURE — 99232 SBSQ HOSP IP/OBS MODERATE 35: CPT | Performed by: NURSE PRACTITIONER

## 2020-07-01 PROCEDURE — 3700000000 HC ANESTHESIA ATTENDED CARE: Performed by: INTERNAL MEDICINE

## 2020-07-01 PROCEDURE — 94761 N-INVAS EAR/PLS OXIMETRY MLT: CPT

## 2020-07-01 PROCEDURE — 6360000002 HC RX W HCPCS: Performed by: NURSE ANESTHETIST, CERTIFIED REGISTERED

## 2020-07-01 PROCEDURE — 36415 COLL VENOUS BLD VENIPUNCTURE: CPT

## 2020-07-01 PROCEDURE — 2060000000 HC ICU INTERMEDIATE R&B

## 2020-07-01 PROCEDURE — 2709999900 HC NON-CHARGEABLE SUPPLY: Performed by: INTERNAL MEDICINE

## 2020-07-01 PROCEDURE — 2700000000 HC OXYGEN THERAPY PER DAY

## 2020-07-01 PROCEDURE — 3609017100 HC EGD: Performed by: INTERNAL MEDICINE

## 2020-07-01 PROCEDURE — 0DJ08ZZ INSPECTION OF UPPER INTESTINAL TRACT, VIA NATURAL OR ARTIFICIAL OPENING ENDOSCOPIC: ICD-10-PCS | Performed by: INTERNAL MEDICINE

## 2020-07-01 PROCEDURE — 2580000003 HC RX 258

## 2020-07-01 PROCEDURE — 85018 HEMOGLOBIN: CPT

## 2020-07-01 RX ORDER — SODIUM CHLORIDE 9 MG/ML
INJECTION, SOLUTION INTRAVENOUS ONCE
Status: COMPLETED | OUTPATIENT
Start: 2020-07-01 | End: 2020-07-01

## 2020-07-01 RX ORDER — 0.9 % SODIUM CHLORIDE 0.9 %
500 INTRAVENOUS SOLUTION INTRAVENOUS ONCE
Status: DISCONTINUED | OUTPATIENT
Start: 2020-07-01 | End: 2020-07-03 | Stop reason: HOSPADM

## 2020-07-01 RX ORDER — PROPOFOL 10 MG/ML
INJECTION, EMULSION INTRAVENOUS PRN
Status: DISCONTINUED | OUTPATIENT
Start: 2020-07-01 | End: 2020-07-01 | Stop reason: SDUPTHER

## 2020-07-01 RX ORDER — MAGNESIUM HYDROXIDE 1200 MG/15ML
LIQUID ORAL PRN
Status: DISCONTINUED | OUTPATIENT
Start: 2020-07-01 | End: 2020-07-01 | Stop reason: ALTCHOICE

## 2020-07-01 RX ORDER — LIDOCAINE HYDROCHLORIDE 20 MG/ML
INJECTION, SOLUTION INFILTRATION; PERINEURAL PRN
Status: DISCONTINUED | OUTPATIENT
Start: 2020-07-01 | End: 2020-07-01 | Stop reason: SDUPTHER

## 2020-07-01 RX ORDER — SIMETHICONE 20 MG/.3ML
EMULSION ORAL PRN
Status: DISCONTINUED | OUTPATIENT
Start: 2020-07-01 | End: 2020-07-01 | Stop reason: ALTCHOICE

## 2020-07-01 RX ORDER — SODIUM CHLORIDE 9 MG/ML
INJECTION, SOLUTION INTRAVENOUS
Status: COMPLETED
Start: 2020-07-01 | End: 2020-07-01

## 2020-07-01 RX ADMIN — PANTOPRAZOLE SODIUM 40 MG: 40 INJECTION, POWDER, FOR SOLUTION INTRAVENOUS at 12:00

## 2020-07-01 RX ADMIN — HYDRALAZINE HYDROCHLORIDE 100 MG: 100 TABLET, FILM COATED ORAL at 12:05

## 2020-07-01 RX ADMIN — VERAPAMIL HYDROCHLORIDE 120 MG: 120 TABLET, FILM COATED, EXTENDED RELEASE ORAL at 21:01

## 2020-07-01 RX ADMIN — SODIUM CHLORIDE: 9 INJECTION, SOLUTION INTRAVENOUS at 10:12

## 2020-07-01 RX ADMIN — INSULIN LISPRO 6 UNITS: 100 INJECTION, SOLUTION INTRAVENOUS; SUBCUTANEOUS at 00:14

## 2020-07-01 RX ADMIN — Medication 10 ML: at 21:01

## 2020-07-01 RX ADMIN — PREDNISONE 40 MG: 10 TABLET ORAL at 12:07

## 2020-07-01 RX ADMIN — FUROSEMIDE 40 MG: 10 INJECTION, SOLUTION INTRAMUSCULAR; INTRAVENOUS at 12:01

## 2020-07-01 RX ADMIN — CARVEDILOL 50 MG: 25 TABLET, FILM COATED ORAL at 12:03

## 2020-07-01 RX ADMIN — FUROSEMIDE 40 MG: 10 INJECTION, SOLUTION INTRAMUSCULAR; INTRAVENOUS at 17:24

## 2020-07-01 RX ADMIN — PROPOFOL 220 MG: 10 INJECTION, EMULSION INTRAVENOUS at 10:32

## 2020-07-01 RX ADMIN — METHYLPREDNISOLONE SODIUM SUCCINATE 40 MG: 40 INJECTION, POWDER, FOR SOLUTION INTRAMUSCULAR; INTRAVENOUS at 00:15

## 2020-07-01 RX ADMIN — ROSUVASTATIN CALCIUM 40 MG: 20 TABLET, FILM COATED ORAL at 17:24

## 2020-07-01 RX ADMIN — IPRATROPIUM BROMIDE AND ALBUTEROL 1 PUFF: 20; 100 SPRAY, METERED RESPIRATORY (INHALATION) at 20:25

## 2020-07-01 RX ADMIN — CARVEDILOL 50 MG: 25 TABLET, FILM COATED ORAL at 21:01

## 2020-07-01 RX ADMIN — Medication 10 ML: at 12:03

## 2020-07-01 RX ADMIN — PANTOPRAZOLE SODIUM 40 MG: 40 INJECTION, POWDER, FOR SOLUTION INTRAVENOUS at 21:01

## 2020-07-01 RX ADMIN — LIDOCAINE HYDROCHLORIDE 60 MG: 20 INJECTION, SOLUTION INFILTRATION; PERINEURAL at 10:32

## 2020-07-01 RX ADMIN — PAROXETINE HYDROCHLORIDE 40 MG: 20 TABLET, FILM COATED ORAL at 12:04

## 2020-07-01 RX ADMIN — DIGOXIN 125 MCG: 125 TABLET ORAL at 12:09

## 2020-07-01 RX ADMIN — IPRATROPIUM BROMIDE AND ALBUTEROL 1 PUFF: 20; 100 SPRAY, METERED RESPIRATORY (INHALATION) at 07:49

## 2020-07-01 RX ADMIN — Medication 10 ML: at 12:06

## 2020-07-01 ASSESSMENT — ENCOUNTER SYMPTOMS
SHORTNESS OF BREATH: 1
CHEST TIGHTNESS: 0
NAUSEA: 0
EYES NEGATIVE: 1
SHORTNESS OF BREATH: 1
BLOOD IN STOOL: 0
GASTROINTESTINAL NEGATIVE: 1
WHEEZING: 0
STRIDOR: 0
COUGH: 0

## 2020-07-01 ASSESSMENT — PULMONARY FUNCTION TESTS
PIF_VALUE: 0

## 2020-07-01 ASSESSMENT — PAIN - FUNCTIONAL ASSESSMENT: PAIN_FUNCTIONAL_ASSESSMENT: 0-10

## 2020-07-01 ASSESSMENT — PAIN SCALES - GENERAL
PAINLEVEL_OUTOF10: 0
PAINLEVEL_OUTOF10: 0

## 2020-07-01 NOTE — PROGRESS NOTES
Progress Note  Patient: Sukhdeep Murillo  Unit/Bed: [facility] OR/NONE  YOB: 1945  MRN: [id number]  Acct: [de-identified]   Admitting Diagnosis: COPD exacerbation ([facility] [J44.1]  Admit Date:  6/29/2020  Hospital Day: 2    Chief Complaint: SOB HF HTN     Histories:  Past Medical History:   Diagnosis Date    Anxiety     Asthma     dx 2019 / has smoked since age 14    CHF (congestive heart failure) (HCC)     Chronic back pain     Bilateral L5 S1 Radic on emg--surprisingly worse on the left than the right--pt's symptoms and her MRI show worse on the right    Chronic obstructive pulmonary disease with acute exacerbation ([facility] 10/12/2019    Depression     Fibromyalgia     Hyperlipidemia     meds > 8 yrs    Hypertension     meds > 45 yrs    On home O2     2l per n/c at bedtime mostly,     Osteoarthritis     Type II diabetes mellitus, uncontrolled ([facility]     hx > 8 yrs    Unspecified sleep apnea      Past Surgical History:   Procedure Laterality Date    BACK SURGERY  2017    lumbar disc    CARDIAC CATHETERIZATION  11/3/14     DR. MIRELES / no stents    COLONOSCOPY  08/29/2016    w/polypectomy     DILATION AND CURETTAGE OF UTERUS N/A 10/7/2019    EUA HYSTEROSCOPY DILATATION AND CURETTAGE performed by Opal Latham DO at [facility]. [address], COLON, DIAGNOSTIC      EYE SURGERY      [place] with IOL OU / [address]  [address]    umbilical hernia repair    [place] ESOPHAGOGASTRODUODENOSCOPY TRANSORAL DIAGNOSTIC N/A 3/24/2017    EGD ESOPHAGOGASTRODUODENOSCOPY performed by Lilia Johnston MD at [facility] N/A 2/8/2018    negative findings    [place] REVISE MEDIAN N/CARPAL TUNNEL SURG Left 6/5/2017    LEFT  CARPAL TUNNEL RELEASE performed by Ed Valenzuela MD at [facility],1+I/F,GOJOP N/A 2/8/2018    TONSILLECTOMY      as child    UPPER GASTROINTESTINAL ENDOSCOPY  08/26/2016    w/bx     UPPER GASTROINTESTINAL 27.3 07/01/2020     07/01/2020    MCV 70.7 07/01/2020    MCH 21.3 07/01/2020    MCHC 30.2 07/01/2020    RDW 26.5 07/01/2020    NRBC 1 06/30/2020    LYMPHOPCT 1.9 07/01/2020    MONOPCT 4.7 07/01/2020    BASOPCT 0.1 07/01/2020    MONOSABS 0.6 07/01/2020    LYMPHSABS 0.2 07/01/2020    EOSABS 0.0 07/01/2020    BASOSABS 0.0 07/01/2020     CMP:    Lab Results   Component Value Date     07/01/2020    K 4.4 07/01/2020     07/01/2020    CO2 18 07/01/2020    BUN 61 07/01/2020    CREATININE 1.92 07/01/2020    GFRAA 30.7 07/01/2020    LABGLOM 25.4 07/01/2020    GLUCOSE 422 07/01/2020    PROT 6.7 06/29/2020    LABALBU 4.1 06/29/2020    CALCIUM 9.1 07/01/2020    BILITOT 0.3 06/29/2020    ALKPHOS 105 06/29/2020    AST 44 06/29/2020    ALT 22 06/29/2020     BMP:    Lab Results   Component Value Date     07/01/2020    K 4.4 07/01/2020     07/01/2020    CO2 18 07/01/2020    BUN 61 07/01/2020    LABALBU 4.1 06/29/2020    CREATININE 1.92 07/01/2020    CALCIUM 9.1 07/01/2020    GFRAA 30.7 07/01/2020    LABGLOM 25.4 07/01/2020    GLUCOSE 422 07/01/2020     Magnesium:    Lab Results   Component Value Date    MG 2.2 06/29/2020     Troponin:    Lab Results   Component Value Date    TROPONINI <0.010 06/29/2020        Active Hospital Problems    Diagnosis Date Noted    COPD exacerbation ([facility] [J44.1] 06/29/2020     Priority: Low    Anemia [D64.9]      Priority: Low    AVM (arteriovenous malformation) [Q27.30]      Priority: Low        Assessment/Plan:  1. Acute on Chronic DHF - continue Lasix iv today. Follow labs. 2. HOCM- no KYLE. need to lower HR. Advanced Coreg to 50  q12 and added Verapamil much better   3. Anemia- history of gastric AVM. W/u in progress. - for EGD  4. HTN Urgency- will check Renal Duplex.    5. CAD mild -stable       Electronically signed by Mukund Dodson MD on 7/1/2020 at 11:12 AM

## 2020-07-01 NOTE — ANESTHESIA POSTPROCEDURE EVALUATION
Department of Anesthesiology  Postprocedure Note    Patient: Brijesh Burger  MRN: [id number]  YOB: 1945  Date of evaluation: 7/1/2020  Time:  10:49 AM     Procedure Summary     Date:  07/01/20 Room / Location:  [facility] OR [address] [place] [address]    Anesthesia Start:  1026 Anesthesia Stop:      Procedure:  EGD PUSH ENTEROSCOPY (N/A ) Diagnosis:  (GI Bleed)    Surgeon:  Lars Landeros MD Responsible Provider:  JOHNATHON Swan CRNA    Anesthesia Type:  MAC ASA Status:  3          Anesthesia Type: MAC    Kristina Phase I: Kristina Score: 10    Kristina Phase II:      Last vitals: Reviewed and per EMR flowsheets.        Anesthesia Post Evaluation    Patient location during evaluation: bedside  Patient participation: complete - patient participated  Level of consciousness: awake and awake and alert  Pain score: 0  Airway patency: patent  Nausea & Vomiting: no nausea and no vomiting  Complications: no  Cardiovascular status: blood pressure returned to baseline and hemodynamically stable  Respiratory status: acceptable and spontaneous ventilation  Hydration status: euvolemic

## 2020-07-01 NOTE — PROGRESS NOTES
previous similar admission in February 2020 with EGD that showed multiple nonbleeding duodenal AVMs and antral erosions, COVID-19 has been ruled out patient has been weaned from BiPAP to 3 L nasal cannula for greater than 24 hours. PLAN :  -Continue course of care, trend H&H, transfuse to keep hemoglobin greater than 7  -NPO for EGD with push enteroscopy today. Thank you for allowing me to participate in the care of your patient. Please feel free to contact me with any concerns.     Reji Elizabeth, JOHNATHON - CNP

## 2020-07-01 NOTE — PROGRESS NOTES
Department of Internal Medicine  General Internal Medicine  Attending Progress Note      SUBJECTIVE:  Pt seen and examined. Hgb stable overnight. Pt to have endoscopy today.  Noted to be significantly hyperglycemic today, likely due to starting a diet yesterday     OBJECTIVE      Medications    Current Facility-Administered Medications: insulin lispro (HUMALOG) injection vial 0-12 Units, 0-12 Units, Subcutaneous, TID WC  insulin lispro (HUMALOG) injection vial 0-6 Units, 0-6 Units, Subcutaneous, Nightly  insulin lispro protamine & lispro (HUMALOG MIX) (75-25) 100 UNIT per ML injection vial SUSP 30 Units, 30 Units, Subcutaneous, BID WC  carvedilol (COREG) tablet 50 mg, 50 mg, Oral, BID  verapamil (CALAN SR) extended release tablet 120 mg, 120 mg, Oral, Nightly  sodium chloride flush 0.9 % injection 10 mL, 10 mL, Intravenous, 2 times per day  sodium chloride flush 0.9 % injection 10 mL, 10 mL, Intravenous, PRN  acetaminophen (TYLENOL) tablet 650 mg, 650 mg, Oral, Q6H PRN **OR** acetaminophen (TYLENOL) suppository 650 mg, 650 mg, Rectal, Q6H PRN  polyethylene glycol (GLYCOLAX) packet 17 g, 17 g, Oral, Daily PRN  promethazine (PHENERGAN) tablet 12.5 mg, 12.5 mg, Oral, Q6H PRN **OR** ondansetron (ZOFRAN) injection 4 mg, 4 mg, Intravenous, Q6H PRN  methylPREDNISolone sodium (SOLU-MEDROL) injection 40 mg, 40 mg, Intravenous, Q6H **FOLLOWED BY** predniSONE (DELTASONE) tablet 40 mg, 40 mg, Oral, Daily  0.9 % sodium chloride bolus, 20 mL, Intravenous, Once  albuterol-ipratropium (COMBIVENT RESPIMAT)  MCG/ACT inhaler 1 puff, 1 puff, Inhalation, TID  nicotine (NICODERM CQ) 14 MG/24HR 1 patch, 1 patch, Transdermal, Daily  digoxin (LANOXIN) tablet 125 mcg, 125 mcg, Oral, Daily  PARoxetine (PAXIL) tablet 40 mg, 40 mg, Oral, Daily  rosuvastatin (CRESTOR) tablet 40 mg, 40 mg, Oral, QPM  glucose (GLUTOSE) 40 % oral gel 15 g, 15 g, Oral, PRN  dextrose 50 % IV solution, 12.5 g, Intravenous, PRN  glucagon (rDNA) injection 1 mg, 1 wean as tolerated  - procalcitonin negative - levaquin discontinued  - COVID negative x2  - diuresing with IV Lasix   - cardiology consulted - increased coreg and added verapamil for better HR control  - will decrease steroids to PO daily - no wheeze, no hypercapnia    # Elevated troponin  - demand ischemia 2/2 CHF  - cardiology consulted    # Acute on chronic blood loss anemia  - has been diagnosed with AVMs and antral ulcer earlier this year  - sp 3U blood. Hgb stable today. Will transfuse <7  - GI consulted - plan for endoscopy today. NPO after midnight  - PPI BID    # CKD3  - baseline normal  - will monitor with diuresis    # IDDM with hyperglycemia  - started a diet yesterday after being NPO and glucose elevated. - increased 75/25 insulin to home dosage and ISS increased  - continue to monitor    Disposition: Monitoring Hgb. Endoscopy today. Continue to wean O2. PT evaluated and pt has no needs and independent. Will discharge home when Hgb stable and O2 weaned down.       Viky Yap, DO  Internal Medicine

## 2020-07-02 LAB
ANION GAP SERPL CALCULATED.3IONS-SCNC: 15 MEQ/L (ref 9–15)
BASOPHILS ABSOLUTE: 0 K/UL (ref 0–0.2)
BASOPHILS RELATIVE PERCENT: 0 %
BILIRUBIN URINE: NEGATIVE
BLOOD, URINE: NEGATIVE
BUN BLDV-MCNC: 71 MG/DL (ref 8–23)
CALCIUM SERPL-MCNC: 8.6 MG/DL (ref 8.5–9.9)
CHLORIDE BLD-SCNC: 99 MEQ/L (ref 95–107)
CLARITY: CLEAR
CO2: 21 MEQ/L (ref 20–31)
COLOR: YELLOW
CREAT SERPL-MCNC: 2.11 MG/DL (ref 0.5–0.9)
CREATININE URINE: 74.9 MG/DL
EOSINOPHILS ABSOLUTE: 0 K/UL (ref 0–0.7)
EOSINOPHILS RELATIVE PERCENT: 0 %
GFR AFRICAN AMERICAN: 27.6
GFR NON-AFRICAN AMERICAN: 22.8
GLUCOSE BLD-MCNC: 119 MG/DL (ref 60–115)
GLUCOSE BLD-MCNC: 367 MG/DL (ref 70–99)
GLUCOSE BLD-MCNC: 389 MG/DL (ref 60–115)
GLUCOSE BLD-MCNC: 443 MG/DL (ref 60–115)
GLUCOSE BLD-MCNC: 50 MG/DL (ref 60–115)
GLUCOSE BLD-MCNC: 67 MG/DL (ref 60–115)
GLUCOSE URINE: >=1000 MG/DL
HCT VFR BLD CALC: 26 % (ref 37–47)
HCT VFR BLD CALC: 26.1 % (ref 37–47)
HEMOGLOBIN: 8 G/DL (ref 12–16)
HEMOGLOBIN: 8.1 G/DL (ref 12–16)
KETONES, URINE: NEGATIVE MG/DL
L. PNEUMOPHILA SEROGP 1 UR AG: NEGATIVE
LEUKOCYTE ESTERASE, URINE: NEGATIVE
LYMPHOCYTES ABSOLUTE: 0.2 K/UL (ref 1–4.8)
LYMPHOCYTES RELATIVE PERCENT: 1.6 %
MCH RBC QN AUTO: 21.8 PG (ref 27–31.3)
MCHC RBC AUTO-ENTMCNC: 31.2 % (ref 33–37)
MCV RBC AUTO: 70 FL (ref 82–100)
MONOCYTES ABSOLUTE: 0.6 K/UL (ref 0.2–0.8)
MONOCYTES RELATIVE PERCENT: 5.1 %
NEUTROPHILS ABSOLUTE: 11.4 K/UL (ref 1.4–6.5)
NEUTROPHILS RELATIVE PERCENT: 93.3 %
NITRITE, URINE: NEGATIVE
PDW BLD-RTO: 27.9 % (ref 11.5–14.5)
PERFORMED ON: ABNORMAL
PERFORMED ON: NORMAL
PH UA: 5 (ref 5–9)
PLATELET # BLD: 440 K/UL (ref 130–400)
POTASSIUM REFLEX MAGNESIUM: 4.2 MEQ/L (ref 3.4–4.9)
PROTEIN PROTEIN: 7 MG/DL
PROTEIN UA: NEGATIVE MG/DL
PROTEIN/CREAT RATIO: 0.1 ML/ML
PROTEIN/CREAT RATIO: 0.1 ML/ML (ref 0–0.2)
RBC # BLD: 3.71 M/UL (ref 4.2–5.4)
SODIUM BLD-SCNC: 135 MEQ/L (ref 135–144)
SPECIFIC GRAVITY UA: 1.02 (ref 1–1.03)
STREP PNEUMO AG, UR: NEGATIVE
UROBILINOGEN, URINE: 0.2 E.U./DL
WBC # BLD: 12.2 K/UL (ref 4.8–10.8)

## 2020-07-02 PROCEDURE — 94761 N-INVAS EAR/PLS OXIMETRY MLT: CPT

## 2020-07-02 PROCEDURE — 2580000003 HC RX 258: Performed by: PHYSICIAN ASSISTANT

## 2020-07-02 PROCEDURE — 85014 HEMATOCRIT: CPT

## 2020-07-02 PROCEDURE — 2700000000 HC OXYGEN THERAPY PER DAY

## 2020-07-02 PROCEDURE — 2060000000 HC ICU INTERMEDIATE R&B

## 2020-07-02 PROCEDURE — 99231 SBSQ HOSP IP/OBS SF/LOW 25: CPT | Performed by: NURSE PRACTITIONER

## 2020-07-02 PROCEDURE — 99233 SBSQ HOSP IP/OBS HIGH 50: CPT | Performed by: INTERNAL MEDICINE

## 2020-07-02 PROCEDURE — 36415 COLL VENOUS BLD VENIPUNCTURE: CPT

## 2020-07-02 PROCEDURE — C9113 INJ PANTOPRAZOLE SODIUM, VIA: HCPCS | Performed by: INTERNAL MEDICINE

## 2020-07-02 PROCEDURE — 85018 HEMOGLOBIN: CPT

## 2020-07-02 PROCEDURE — 94640 AIRWAY INHALATION TREATMENT: CPT

## 2020-07-02 PROCEDURE — 84156 ASSAY OF PROTEIN URINE: CPT

## 2020-07-02 PROCEDURE — 6360000002 HC RX W HCPCS: Performed by: INTERNAL MEDICINE

## 2020-07-02 PROCEDURE — 81003 URINALYSIS AUTO W/O SCOPE: CPT

## 2020-07-02 PROCEDURE — 2580000003 HC RX 258: Performed by: INTERNAL MEDICINE

## 2020-07-02 PROCEDURE — 6370000000 HC RX 637 (ALT 250 FOR IP): Performed by: INTERNAL MEDICINE

## 2020-07-02 PROCEDURE — 80048 BASIC METABOLIC PNL TOTAL CA: CPT

## 2020-07-02 PROCEDURE — 94664 DEMO&/EVAL PT USE INHALER: CPT

## 2020-07-02 PROCEDURE — 85025 COMPLETE CBC W/AUTO DIFF WBC: CPT

## 2020-07-02 RX ORDER — FUROSEMIDE 40 MG/1
40 TABLET ORAL DAILY
Status: DISCONTINUED | OUTPATIENT
Start: 2020-07-02 | End: 2020-07-03 | Stop reason: HOSPADM

## 2020-07-02 RX ADMIN — IPRATROPIUM BROMIDE AND ALBUTEROL 1 PUFF: 20; 100 SPRAY, METERED RESPIRATORY (INHALATION) at 07:11

## 2020-07-02 RX ADMIN — HYDRALAZINE HYDROCHLORIDE 100 MG: 100 TABLET, FILM COATED ORAL at 10:03

## 2020-07-02 RX ADMIN — DIGOXIN 125 MCG: 125 TABLET ORAL at 10:03

## 2020-07-02 RX ADMIN — PANTOPRAZOLE SODIUM 40 MG: 40 INJECTION, POWDER, FOR SOLUTION INTRAVENOUS at 10:02

## 2020-07-02 RX ADMIN — PAROXETINE HYDROCHLORIDE 40 MG: 20 TABLET, FILM COATED ORAL at 10:03

## 2020-07-02 RX ADMIN — CARVEDILOL 50 MG: 25 TABLET, FILM COATED ORAL at 10:03

## 2020-07-02 RX ADMIN — Medication 10 ML: at 10:02

## 2020-07-02 RX ADMIN — ROSUVASTATIN CALCIUM 40 MG: 20 TABLET, FILM COATED ORAL at 21:01

## 2020-07-02 RX ADMIN — HYDRALAZINE HYDROCHLORIDE 100 MG: 100 TABLET, FILM COATED ORAL at 21:09

## 2020-07-02 RX ADMIN — Medication 10 ML: at 21:01

## 2020-07-02 RX ADMIN — IPRATROPIUM BROMIDE AND ALBUTEROL 1 PUFF: 20; 100 SPRAY, METERED RESPIRATORY (INHALATION) at 13:19

## 2020-07-02 RX ADMIN — IPRATROPIUM BROMIDE AND ALBUTEROL 1 PUFF: 20; 100 SPRAY, METERED RESPIRATORY (INHALATION) at 19:47

## 2020-07-02 RX ADMIN — PANTOPRAZOLE SODIUM 40 MG: 40 INJECTION, POWDER, FOR SOLUTION INTRAVENOUS at 21:01

## 2020-07-02 RX ADMIN — CARVEDILOL 50 MG: 25 TABLET, FILM COATED ORAL at 21:01

## 2020-07-02 ASSESSMENT — ENCOUNTER SYMPTOMS
EYE DISCHARGE: 0
WHEEZING: 0
CHEST TIGHTNESS: 0
STRIDOR: 0
SHORTNESS OF BREATH: 1
BLOOD IN STOOL: 0
COUGH: 0
NAUSEA: 0
GASTROINTESTINAL NEGATIVE: 1
COLOR CHANGE: 0
EYES NEGATIVE: 1
ABDOMINAL DISTENTION: 0

## 2020-07-02 NOTE — FLOWSHEET NOTE
Pts glucose 50 for dinner. Pt states she \"feels fine. \" Pt is not diaphoretic and is able to speak/cummincate appropriately with staff. Pt provided two orange juice which she has drank. Will recheck glucose shortly. Electronically signed by David Multani RN on 7/2/2020 at 4:02 PM    Pts glucose now 67, pt continues to state that she feels fine. Pt refusing crackers or alternate snack and states that she is ok to wait for dinner.  Electronically signed by David Multani RN on 7/2/2020 at 4:09 PM

## 2020-07-02 NOTE — PROGRESS NOTES
Progress Note  Patient: Da Morales  Unit/Bed: -65  YOB: 1945  MRN: [id number]  Acct: [de-identified]   Admitting Diagnosis: COPD exacerbation ([facility] [address] [J44.1]  Admit Date:  2020  Hospital Day: 3    Chief Complaint: SOB HF HTN SAÚL    Histories:  Past Medical History:   Diagnosis Date    Anxiety     Asthma     dx 2019 / has smoked since age 14    CHF (congestive heart failure) (HCC)     Chronic back pain     Bilateral L5 S1 Radic on emg--surprisingly worse on the left than the right--pt's symptoms and her MRI show worse on the right    Chronic obstructive pulmonary disease with acute exacerbation ([facility] [address] 10/12/2019    Depression     Fibromyalgia     Hyperlipidemia     meds > 8 yrs    Hypertension     meds > 45 yrs    On home O2     2l per n/c at bedtime mostly,     Osteoarthritis     Type II diabetes mellitus, uncontrolled ([facility] [address]     hx > 8 yrs    Unspecified sleep apnea      Past Surgical History:   Procedure Laterality Date    BACK SURGERY  2017    lumbar disc    CARDIAC CATHETERIZATION  11/3/14     DR. MIRELES / no stents    COLONOSCOPY  2016    w/polypectomy     DILATION AND CURETTAGE OF UTERUS N/A 10/7/2019    EUA HYSTEROSCOPY DILATATION AND CURETTAGE performed by Martita Juarez DO at [facility]. [address], COLON, DIAGNOSTIC      EYE SURGERY      [place] with IOL OU / [address]  [address]    umbilical hernia repair    RI ESOPHAGOGASTRODUODENOSCOPY TRANSORAL DIAGNOSTIC N/A 3/24/2017    EGD ESOPHAGOGASTRODUODENOSCOPY performed by Viviana Carter MD at [facility] N/A 2018    negative findings    RI REVISE MEDIAN N/CARPAL TUNNEL SURG Left 2017    LEFT  CARPAL TUNNEL RELEASE performed by Angelica Cruz MD at [facility],8+Q/U,NHUNV N/A 2018    TONSILLECTOMY      as child    UPPER GASTROINTESTINAL ENDOSCOPY  2016    w/bx     UPPER GASTROINTESTINAL ENDOSCOPY N/A 2020    EGD possible biopsy performed by Angelika Bradshaw MD at [address] N/A 2020    EGD [facility]     Family History   Problem Relation Age of Onset    Heart Disease Father         cardiac bypass    Arthritis Father     Arthritis Mother     Other Mother          at age 80    Other [facility]    No Known Problems Daughter     Stroke Son      Social History     Socioeconomic History    Marital status:      Spouse name: None    Number of children: None    Years of education: None    Highest education level: None   Occupational History    Occupation: Retired-   Social Needs    Financial resource strain: None    Food insecurity     Worry: None     Inability: None    Transportation needs     Medical: None     Non-medical: None   Tobacco Use    Smoking status: Former Smoker     Packs/day: 1.00     Years: 59.00     Pack years: 59.00     Types: Cigarettes     Start date: 2017    Smokeless tobacco: Never Used    Tobacco comment: quit 2-3 weeks ago    Substance and Sexual Activity    Alcohol use: Not Currently    Drug use: No    Sexual activity: Yes   Lifestyle    Physical activity     Days per week: None     Minutes per session: None    Stress: None   Relationships    Social connections     Talks on phone: None     Gets together: None     Attends Christianity service: None     Active member of club or organization: None     Attends meetings of clubs or organizations: None     Relationship status: None    Intimate partner violence     Fear of current or ex partner: None     Emotionally abused: None     Physically abused: None     Forced sexual activity: None   Other Topics Concern    None   Social History Narrative    None       Subjective/HPI  BP is better. HR is better. No CP no events overnight.  Prerenal this am. PO Lasix Held.  Wants to go home    EKG: SR 70        Review of Systems:   Review of Systems   Constitutional: Negative. Negative for diaphoresis and fatigue. HENT: Negative. Eyes: Negative. Respiratory: Positive for shortness of breath. Negative for cough, chest tightness, wheezing and stridor. Cardiovascular: Negative. Negative for chest pain, palpitations and leg swelling. Gastrointestinal: Negative. Negative for blood in stool and nausea. Genitourinary: Negative. Musculoskeletal: Negative. Skin: Negative. Neurological: Negative. Negative for dizziness, syncope, weakness and light-headedness. Hematological: Negative. Psychiatric/Behavioral: Negative. Physical Examination:    BP (!) 139/57   Pulse 80   Temp 97.9 °F (36.6 °C)   Resp 20   Ht 4' 11\" (1.499 m)   Wt 116 lb 4.8 oz (52.8 kg)   LMP  (LMP Unknown)   SpO2 98%   BMI 23.49 kg/m²    Physical Exam   Constitutional: She appears healthy. No distress. HENT:   Normal cephalic and Atraumatic   Eyes: Pupils are equal, round, and reactive to light. Neck: Normal range of motion and thyroid normal. Neck supple. No JVD present. No neck adenopathy. No thyromegaly present. Cardiovascular: Normal rate, regular rhythm, intact distal pulses and normal pulses. Murmur heard. Pulmonary/Chest: Effort normal and breath sounds normal. She has no wheezes. She has no rales. She exhibits no tenderness. Abdominal: Soft. Bowel sounds are normal. There is no abdominal tenderness. Musculoskeletal: Normal range of motion. General: No tenderness or edema. Neurological: She is alert and oriented to person, place, and time. Skin: Skin is warm. No cyanosis. Nails show no clubbing.        LABS:  CBC:   Lab Results   Component Value Date    WBC 12.2 07/02/2020    RBC 3.71 07/02/2020    HGB 8.0 07/02/2020    HGB 8.1 07/02/2020    HCT 26.1 07/02/2020    HCT 26.0 07/02/2020    MCV 70.0 07/02/2020    MCH 21.8 07/02/2020    MCHC 31.2 07/02/2020    RDW 27.9 07/02/2020     07/02/2020    MPV 8.8 08/01/2015     CBC with Differential:    Lab Results   Component Value Date    WBC 12.2 07/02/2020    RBC 3.71 07/02/2020    HGB 8.0 07/02/2020    HGB 8.1 07/02/2020    HCT 26.1 07/02/2020    HCT 26.0 07/02/2020     07/02/2020    MCV 70.0 07/02/2020    MCH 21.8 07/02/2020    MCHC 31.2 07/02/2020    RDW 27.9 07/02/2020    NRBC 1 06/30/2020    LYMPHOPCT 1.6 07/02/2020    MONOPCT 5.1 07/02/2020    BASOPCT 0.0 07/02/2020    MONOSABS 0.6 07/02/2020    LYMPHSABS 0.2 07/02/2020    EOSABS 0.0 07/02/2020    BASOSABS 0.0 07/02/2020     CMP:    Lab Results   Component Value Date     07/02/2020    K 4.2 07/02/2020    CL 99 07/02/2020    CO2 21 07/02/2020    BUN 71 07/02/2020    CREATININE 2.11 07/02/2020    GFRAA 27.6 07/02/2020    LABGLOM 22.8 07/02/2020    GLUCOSE 367 07/02/2020    PROT 6.7 06/29/2020    LABALBU 4.1 06/29/2020    CALCIUM 8.6 07/02/2020    BILITOT 0.3 06/29/2020    ALKPHOS 105 06/29/2020    AST 44 06/29/2020    ALT 22 06/29/2020     BMP:    Lab Results   Component Value Date     07/02/2020    K 4.2 07/02/2020    CL 99 07/02/2020    CO2 21 07/02/2020    BUN 71 07/02/2020    LABALBU 4.1 06/29/2020    CREATININE 2.11 07/02/2020    CALCIUM 8.6 07/02/2020    GFRAA 27.6 07/02/2020    LABGLOM 22.8 07/02/2020    GLUCOSE 367 07/02/2020     Magnesium:    Lab Results   Component Value Date    MG 2.2 06/29/2020     Troponin:    Lab Results   Component Value Date    TROPONINI <0.010 06/29/2020        Active Hospital Problems    Diagnosis Date Noted    COPD exacerbation ([facility] [address] [J44.1] 06/29/2020     Priority: Low    Anemia [D64.9]      Priority: Low    AVM (arteriovenous malformation) [Q27.30]      Priority: Low        Assessment/Plan:  1. Acute on Chronic DHF -HOCM- no KYLE. need to lower HR.  much better with Higher BB and addition of CCB  2. Anemia- history of gastric AVM. EGD reported negative for active bleed- Final report -P  3.  HTN Urgency- much improved. 4. Right Renal artery stenosis -can be addressed as out pt. 5. CAD mild -stable   6. Prerenal- hold Lasix for few days then resume.   7. OK for dc from CV point- f/u Dr. Foreign James 1 week       Electronically signed by Eleuterio Greene MD on 7/2/2020 at 11:28 AM

## 2020-07-02 NOTE — PROGRESS NOTES
Department of Internal Medicine  General Internal Medicine  Attending Progress Note      SUBJECTIVE:  Pt seen and examined. Hgb stable overnight. Glucose has been elevated. Discontinued prednisone as patient denies sob or wheeze. Pt on room air. States she wants to go home.      OBJECTIVE      Medications    Current Facility-Administered Medications: furosemide (LASIX) tablet 40 mg, 40 mg, Oral, Daily  insulin lispro (HUMALOG) injection vial 0-12 Units, 0-12 Units, Subcutaneous, TID WC  insulin lispro (HUMALOG) injection vial 0-6 Units, 0-6 Units, Subcutaneous, Nightly  insulin lispro protamine & lispro (HUMALOG MIX) (75-25) 100 UNIT per ML injection vial SUSP 30 Units, 30 Units, Subcutaneous, BID WC  0.9 % sodium chloride bolus, 500 mL, Intravenous, Once  carvedilol (COREG) tablet 50 mg, 50 mg, Oral, BID  verapamil (CALAN SR) extended release tablet 120 mg, 120 mg, Oral, Nightly  sodium chloride flush 0.9 % injection 10 mL, 10 mL, Intravenous, 2 times per day  sodium chloride flush 0.9 % injection 10 mL, 10 mL, Intravenous, PRN  acetaminophen (TYLENOL) tablet 650 mg, 650 mg, Oral, Q6H PRN **OR** acetaminophen (TYLENOL) suppository 650 mg, 650 mg, Rectal, Q6H PRN  polyethylene glycol (GLYCOLAX) packet 17 g, 17 g, Oral, Daily PRN  promethazine (PHENERGAN) tablet 12.5 mg, 12.5 mg, Oral, Q6H PRN **OR** ondansetron (ZOFRAN) injection 4 mg, 4 mg, Intravenous, Q6H PRN  0.9 % sodium chloride bolus, 20 mL, Intravenous, Once  albuterol-ipratropium (COMBIVENT RESPIMAT)  MCG/ACT inhaler 1 puff, 1 puff, Inhalation, TID  nicotine (NICODERM CQ) 14 MG/24HR 1 patch, 1 patch, Transdermal, Daily  digoxin (LANOXIN) tablet 125 mcg, 125 mcg, Oral, Daily  PARoxetine (PAXIL) tablet 40 mg, 40 mg, Oral, Daily  rosuvastatin (CRESTOR) tablet 40 mg, 40 mg, Oral, QPM  glucose (GLUTOSE) 40 % oral gel 15 g, 15 g, Oral, PRN  dextrose 50 % IV solution, 12.5 g, Intravenous, PRN  glucagon (rDNA) injection 1 mg, 1 mg, Intramuscular, PRN  dextrose 5 % solution, 100 mL/hr, Intravenous, PRN  hydrALAZINE (APRESOLINE) tablet 100 mg, 100 mg, Oral, BID  pantoprazole (PROTONIX) injection 40 mg, 40 mg, Intravenous, BID **AND** sodium chloride (PF) 0.9 % injection 10 mL, 10 mL, Intravenous, Daily  Physical    VITALS:  BP (!) 129/43   Pulse 82   Temp 97.9 °F (36.6 °C) (Oral)   Resp 20   Ht 4' 11\" (1.499 m)   Wt 116 lb 4.8 oz (52.8 kg)   LMP  (LMP Unknown)   SpO2 98%   BMI 23.49 kg/m²   Constitutional: Awake and alert in no acute distress. Sitting in bed comfortably  Head: Normocephalic, atraumatic  Eyes: EOMI, PERRLA  ENT: moist mucous membranes, nasal cannula in place  Neck: neck supple, trachea midline  Lungs: Good inspiratory effort, no wheeze, no crackles  Heart: RRR, normal S1 and S2, no murmurs  GI: Soft, non-distended, non tender, no guarding, no rebound, +BS  MSK: no edema noted  Pulses: 2+ pulses bilaterally  Skin: warm, dry  Psych: appropriate affect     Data    CBC:   Lab Results   Component Value Date    WBC 12.2 07/02/2020    RBC 3.71 07/02/2020    HGB 8.0 07/02/2020    HGB 8.1 07/02/2020    HCT 26.1 07/02/2020    HCT 26.0 07/02/2020    MCV 70.0 07/02/2020    MCH 21.8 07/02/2020    MCHC 31.2 07/02/2020    RDW 27.9 07/02/2020     07/02/2020    MPV 8.8 08/01/2015     CMP:    Lab Results   Component Value Date     07/02/2020    K 4.2 07/02/2020    CL 99 07/02/2020    CO2 21 07/02/2020    BUN 71 07/02/2020    CREATININE 2.11 07/02/2020    GFRAA 27.6 07/02/2020    LABGLOM 22.8 07/02/2020    GLUCOSE 367 07/02/2020    PROT 6.7 06/29/2020    LABALBU 4.1 06/29/2020    CALCIUM 8.6 07/02/2020    BILITOT 0.3 06/29/2020    ALKPHOS 105 06/29/2020    AST 44 06/29/2020    ALT 22 06/29/2020       ASSESSMENT AND PLAN      # Acute hypoxic respiratory failure 2/2 acute on chronic diastolic CHF  - CXR with pulmonary edema  - initially hypoxic in the ED and placed on BiPAP.  Now on room air  - procalcitonin negative - levaquin discontinued  - COVID negative x2  - IV Lasix changed to PO  - cardiology consulted - increased coreg and added verapamil for better HR control  - discontinued steroids    # Elevated troponin  - demand ischemia 2/2 CHF  - cardiology consulted    # Acute on chronic blood loss anemia  - has been diagnosed with AVMs and antral ulcer earlier this year  - sp 3U blood. Hgb stable today. Will transfuse <7  - GI consulted - plan for endoscopy today. NPO after midnight  - PPI BID    # SAÚL on CKD3  - possibly 2/2 diuresis vs hypovolemia in setting of anemia - decreased lasix to PO today  - nephrology consulted  - baseline normal  - will monitor with diuresis    # IDDM with hyperglycemia  - likely steroid induced - discontinued steroids today  - started a diet yesterday after being NPO and glucose elevated. - cont 75/25 insulin home dosage and ISS increased  - continue to monitor    Disposition: Monitoring Hgb. Endoscopy results pending. On room air. Now with SAÚL likely 2/2 diuresis and hyperglycemia 2/2 steroids. Nephrology consulted. Pt wants to go home. Will discharge when ok from consultants stand.       Eladia Monterroso, DO  Internal Medicine

## 2020-07-02 NOTE — CONSULTS
[facility]  Consult Note           Reason for Consult:  [place]  Requesting Physician:  Dr. MOSQUERA [facility] [employer]    Chief Complaint:  Shortness of breath, weakness  History Obtained From:  patient, electronic medical record    History of Present Ilness:    75y.o. year old female admitted with shortness of breath and weakness. Hgb was under 6. Got transfused. Had elevated bp. Renal duplex done. Clear on left. Suggested > 60% on right. No hydro. Didn't have edema but lung exam c/w chf.  Got iv lasix. Cr went from 1.2 to 2.1. No contrast procedures. No hypotension. Echo with normal ef. On lisinopril and aldactone at home. Hasn't been on those here. Was on lasix 40 bid iv. Changed to daily. Anxious to go home  Past Medical History:        Diagnosis Date    Anxiety     Asthma     dx 2019 / has smoked since age 14    CHF (congestive heart failure) (HCC)     Chronic back pain     Bilateral L5 S1 Radic on emg--surprisingly worse on the left than the right--pt's symptoms and her MRI show worse on the right    Chronic obstructive pulmonary disease with acute exacerbation ([address] 10/12/2019    Depression     Fibromyalgia     Hyperlipidemia     meds > 8 yrs    Hypertension     meds > 45 yrs    On home O2     2l per n/c at bedtime mostly,     Osteoarthritis     Type II diabetes mellitus, uncontrolled ([address]     hx > 8 yrs    Unspecified sleep apnea        Past Surgical History:        Procedure Laterality Date    BACK SURGERY  2017    lumbar disc    CARDIAC CATHETERIZATION  11/3/14     DR. MIRELES / no stents    COLONOSCOPY  08/29/2016    w/polypectomy     DILATION AND CURETTAGE OF UTERUS N/A 10/7/2019    EUA HYSTEROSCOPY DILATATION AND CURETTAGE performed by King Kulwant DO at [address] [place] [address] ENDOSCOPY, COLON, DIAGNOSTIC      EYE SURGERY      Phaco with IOL OU / [address] [place]  [address]    umbilical hernia repair    DE ESOPHAGOGASTRODUODENOSCOPY TRANSORAL DIAGNOSTIC N/A 3/24/2017    EGD ESOPHAGOGASTRODUODENOSCOPY performed by Doc Turner MD at [facility] N/A 2/8/2018    negative findings    UT REVISE MEDIAN N/CARPAL TUNNEL SURG Left 6/5/2017    LEFT  CARPAL TUNNEL RELEASE performed by Sonia Freeman MD at [facility],4+E/P,KARWG N/A 2/8/2018    TONSILLECTOMY      as child    UPPER GASTROINTESTINAL ENDOSCOPY  08/26/2016    w/bx     UPPER GASTROINTESTINAL ENDOSCOPY N/A 2/25/2020    EGD possible biopsy performed by Jb Chun MD at [address] [place] [address] 7/1/2020    EGD PUSH ENTEROSCOPY performed by Carolina Barakat MD at [facility] Medications:    No current facility-administered medications on file prior to encounter. Current Outpatient Medications on File Prior to Encounter   Medication Sig Dispense Refill    insulin 70-30 (NOVOLIN 70/30) (70-30) 100 UNIT per ML injection vial 30-40  units twice a day if glucose more than 120 2 vial 3    ACCU-CHEK PHYLLIS PLUS strip Test 3x daily Dx E11.65 100 each 3    Accu-Chek Softclix Lancets MISC bid 100 each 3    PARoxetine (PAXIL) 40 MG tablet TAKE 1 TABLET BY MOUTH ONCE DAILY IN THE MORNING 90 tablet 0    carvedilol (COREG) 25 MG tablet Take 1 tablet by mouth 2 times daily 180 tablet 1    digoxin (LANOXIN) 125 MCG tablet Take 1 tablet by mouth daily 90 tablet 1    hydrALAZINE (APRESOLINE) 100 MG tablet Take 100 mg by mouth 2 times daily      pantoprazole (PROTONIX) 40 MG tablet Take 40mg twice daily (1st dose 30min before breakfast) for 30 days.  After 30 days, you make take 40mg once daily before breakfast. 60 tablet 3    furosemide (LASIX) 40 MG tablet Take 1 tablet by mouth daily 90 tablet 0    rosuvastatin (CRESTOR) 40 MG tablet Take 1 tablet by mouth every evening 90 tablet 0    dicyclomine (BENTYL) 10 MG capsule Take 1 capsule by mouth 4 times daily as needed (abdominal pain) 120 capsule 3  ondansetron (ZOFRAN) 4 MG tablet Take 1 tablet by mouth every 8 hours as needed for Nausea or Vomiting 30 tablet 2    Insulin Syringe-Needle U-100 (INSULIN SYRINGE 1CC/31GX5/16\") 31G X 5/16\" 1 ML MISC 1 each by Does not apply route daily 50 each 3    lisinopril (PRINIVIL;ZESTRIL) 10 MG tablet Take 1 tablet by mouth 2 times daily 30 tablet 3    spironolactone (ALDACTONE) 25 MG tablet Take 1 tablet by mouth daily 30 tablet 3       Allergies:  Ibuprofen; Metformin and related; and Darvon [propoxyphene hcl]    Social History:    Social History     Socioeconomic History    Marital status:      Spouse name: Not on file    Number of children: Not on file    Years of education: Not on file    Highest education level: Not on file   Occupational History    Occupation: Retired-   Social Needs    Financial resource strain: Not on file    Food insecurity     Worry: Not on file     Inability: Not on file   [employer] needs     Medical: Not on file     Non-medical: Not on file   Tobacco Use    Smoking status: Former Smoker     Packs/day: 1.00     Years: 59.00     Pack years: 59.00     Types: Cigarettes     Start date: 11/30/2017    Smokeless tobacco: Never Used    Tobacco comment: quit 2-3 weeks ago    Substance and Sexual Activity    Alcohol use: Not Currently    Drug use: No    Sexual activity: Yes   Lifestyle    Physical activity     Days per week: Not on file     Minutes per session: Not on file    Stress: Not on file   Relationships    Social connections     Talks on phone: Not on file     Gets together: Not on file     Attends Zoroastrianism service: Not on file     Active member of club or organization: Not on file     Attends meetings of clubs or organizations: Not on file     Relationship status: Not on file    Intimate partner violence     Fear of current or ex partner: Not on file     Emotionally abused: Not on file     Physically abused: Not on file     Forced sexual activity: Not on file   Other Topics Concern    Not on file   Social History Narrative    Not on file       Family History:   Family History   Problem Relation Age of Onset    Heart Disease Father         cardiac bypass    Arthritis Father     Arthritis Mother     Other Mother          at age 80    Other Sister         [facility] health hx    No Known Problems Daughter     Stroke Son        Review of Systems:   Review of Systems   Constitutional: Negative for activity change. HENT: Negative for congestion. Eyes: Negative for discharge. Respiratory: Positive for shortness of breath. Cardiovascular: Negative for chest pain. Gastrointestinal: Negative for abdominal distention. Endocrine: Negative for cold intolerance. Genitourinary: Negative for difficulty urinating. Musculoskeletal: Negative for arthralgias. Skin: Negative for color change. Allergic/Immunologic: Negative for environmental allergies. Neurological: Negative for dizziness. Hematological: Negative for adenopathy. Psychiatric/Behavioral: Negative for agitation. Physical exam:   Constitutional:  VITALS:  BP (!) 129/43   Pulse 82   Temp 97.9 °F (36.6 °C) (Oral)   Resp 20   Ht 4' 11\" (1.499 m)   Wt 116 lb 4.8 oz (52.8 kg)   LMP  (LMP Unknown)   SpO2 98%   BMI 23.49 kg/m²   Gen: alert, awake, nad  Skin: no rash, turgor wnl  Heent:  eomi, mmm  Neck: no bruits or jvd noted, thyroid normal  Lungs:  Normal expansion. Clear to auscultation. No rales, rhonchi, or wheezing. Heart:  Heart sounds are normal.  Regular rate and rhythm without murmur, gallop or rub. Abdomen:  +bs, soft, nt, nd, no hepatosplenomegaly   Extremities: Extremities warm to touch, pink, with no edema.   Psychiatric: mood and affect appropriate; judgement and insight intact  Musculoskeletal:  Rom, muscular strength intact; digits, nails normal    Data/  CBC:   Lab Results   Component Value Date    WBC 12.2 2020    RBC 3.71 2020 RIGHT           Resistive Index   Acceleration Time Sup        0.76                    40 Mid        0.72                    16 Inf          0.77                    28 LEFT           Resistive Index   Acceleration Time Sup        0.74                    24 Mid        0.78                    32 Inf          0.73                    24     PEAK SYSTOLIC VELOCITY INVOLVING THE ORIGIN OF THE RIGHT RENAL ARTERY  WITH END-DIASTOLIC VELOCITY OF 31. THE RENAL TO AORTIC RATIO IS 4.0 CONSISTENT WITH LESION GREATER THAN 60%. NO SIGNIFICANT STENOSIS INVOLVING THE LEFT RENAL ARTERY. BILATERAL KIDNEYS ARE MILDLY CONTRACTED. Assessment/  76 yo lady with DM, HTN, normal EF. On lisinopril, aldactone amongst other meds at home. Admitted with hgb in 5's. Iron deficiency based on labs from October 2019. Previous UA neg. Given iv lasix here for sob. Now with SAÚL. Has renal duplex with right LYNDON but clear on left. Plan/  1- recheck iron levels (pt transfused so may not be accurate)  2- hold lasix due to saúl  3- recheck ua  4. Keep ace I on hold  5. In terms of the right LYNDON, would hold off on further eval for now    Thank you for the consultation. Please do not hesitate to call with questions.     Cristian Nguyen

## 2020-07-02 NOTE — PROGRESS NOTES
20 pack years  []   Pulmonary Disorder  (acute or chronic)  []   Severe or Chronic w/ Exacerbation  [x]     Surgical Status No [x]   Surgeries     General []   Surgery Lower []   Abdominal Thoracic or []   Upper Abdominal Thoracic with  PulmonaryDisorder  []     Chest X-ray Clear/Not  Ordered     [x]  Chronic Changes  Results Pending  []  Infiltrates, atelectasis, pleural effusion, or edema  []  Infiltrates in more than one lobe []  Infiltrate + Atelectasis, &/or pleural effusion  []    Respiratory Pattern Regular,  RR = 12-20 [x]  Increased,  RR = 21-25 []  MENDEZ, irregular,  or RR = 26-30 []  Decreased FEV1  or RR = 31-35 []  Severe SOB, use  of accessory muscles, or RR ? 35  []    Mental Status Alert, oriented,  Cooperative [x]  Confused but Follows commands []  Lethargic or unable to follow commands []  Obtunded  []  Comatose  []    Breath Sounds Clear to  auscultation  []  Decreased unilaterally or  in bases only []  Decreased  bilaterally  [x]  Crackles or intermittent wheezes []  Wheezes []    Cough Strong, Spontan., & nonproductive [x]  Strong,  spontaneous, &  productive []  Weak,  Nonproductive []  Weak, productive or  with wheezes []  No spontaneous  cough or may require suctioning []    Level of Activity Ambulatory []  Ambulatory w/ Assist  [x]  Non-ambulatory []  Paraplegic []  Quadriplegic []    Total    Score:_7______     Triage Score:____4____      Tri       Triage:     1. (>20) Freq: Q3    2. (16-20) Freq: Q4   3. (11-15) Freq: QID & Albuterol Q2 PRN    4. (6-10) Freq: TID & Albuterol Q2 PRN    5. (0-5) Freq Q4prn

## 2020-07-02 NOTE — CARE COORDINATION
SPOKE WITH PATIENT THIS AM AND SHE WOULD LIKE [facility] AT NE.  IMM REVIEWED AND VERBALIZED UNDERSTANDING

## 2020-07-02 NOTE — PROGRESS NOTES
Gastroenterology Progress Note    Sukhdeep Murillo is a 75 y.o. female patient. Hospitalization Day:3    Chief C/O: anemia    SUBJECTIVE: Patient was seen and examined on the regular medical floor, she is hemodynamically stable, has no abdominal pain, tolerating diet. Post EGD results noted negative push enteroscopy, pediatric scope advanced to 90 cm from the incisors no AVM identified, Scope could not further advanced owing to resistance felt pushing the scope forward at the proximal jejunum. No overt GI bleed, hemoglobin is 8.1, last colonoscopy was . ROS:  Gastrointestinal ROS: no abdominal pain, change in bowel habits, or black or bloody stools    Physical    VITALS:  BP (!) 129/43   Pulse 82   Temp 97.9 °F (36.6 °C) (Oral)   Resp 20   Ht 4' 11\" (1.499 m)   Wt 116 lb 4.8 oz (52.8 kg)   LMP  (LMP Unknown)   SpO2 100%   BMI 23.49 kg/m²   TEMPERATURE:  Current - Temp: 97.9 °F (36.6 °C); Max - Temp  Av.9 °F (36.6 °C)  Min: 97.6 °F (36.4 °C)  Max: 98.4 °F (36.9 °C)    General:  Alert and oriented,  No apparent distress  Skin- without jaundice  Eyes: anicteric sclera  Cardiac: RRR, Nl s1s2, without murmurs  Lungs CTA Bilaterally, normal effort  Abdomen soft, ND, NT, no HSM, Bowel sounds normal  Ext: without edema  Neuro: no asterixis     Data    Data Review:    Recent Labs     20  0641  20  0319  20  1530 20  2116 20  0550   WBC 10.0  --  12.1*  --   --   --  12.2*   HGB 6.3*   < > 8.0*   < > 8.3* 8.5* 8.1*   HCT 21.0*   < > 26.4*   < > 27.3* 28.5* 26.0*   MCV 68.4*  --  70.7*  --   --   --  70.0*   *  --  413*  --   --   --  440*    < > = values in this interval not displayed.      Recent Labs     20  0715 20  0832 20  0641 20  0319     --  136 135   K 4.9  --  4.7 4.4   *  --  102 100   CO2 21  --  17* 18*   BUN 23  --  44* 61*   CREATININE 1.12* 1.1 1.49* 1.92*     Recent Labs     20  0715   AST 44*   ALT 22   BILITOT 0. 3   ALKPHOS 105     No results for input(s): LIPASE, AMYLASE in the last 72 hours. Recent Labs     06/29/20  0715   PROTIME 13.6   INR 1.0           ASSESSMENT:  ASSESSMENT:  26-year-old -American female omitted with shortness of breath and generalized weakness found to be anemic with a hemoglobin of 5, baseline 78, previous similar admission in February 2020 with EGD that showed multiple nonbleeding duodenal AVMs and antral erosions, recent normal Push enteroscopy, no active bleeding found. PLAN :  -Continue course of care, trend H&H, transfuse to keep hemoglobin greater than 7  -GI will sign off patient should follow-up with Dr. Leilani Vasques as an outpatient for follow-up colonoscopy and small bowel pill capsule endoscopy. Thank you for allowing me to participate in the care of your patient. Please feel free to contact me with any concerns.     JOHNATHON Falcon Ala - CNP

## 2020-07-03 VITALS
HEART RATE: 71 BPM | TEMPERATURE: 97.5 F | OXYGEN SATURATION: 100 % | HEIGHT: 59 IN | BODY MASS INDEX: 23.32 KG/M2 | RESPIRATION RATE: 16 BRPM | SYSTOLIC BLOOD PRESSURE: 136 MMHG | WEIGHT: 115.7 LBS | DIASTOLIC BLOOD PRESSURE: 48 MMHG

## 2020-07-03 LAB
ANION GAP SERPL CALCULATED.3IONS-SCNC: 13 MEQ/L (ref 9–15)
ANISOCYTOSIS: ABNORMAL
BASOPHILS ABSOLUTE: 0 K/UL (ref 0–0.2)
BASOPHILS RELATIVE PERCENT: 0.4 %
BUN BLDV-MCNC: 61 MG/DL (ref 8–23)
CALCIUM SERPL-MCNC: 9.3 MG/DL (ref 8.5–9.9)
CHLORIDE BLD-SCNC: 105 MEQ/L (ref 95–107)
CO2: 23 MEQ/L (ref 20–31)
CREAT SERPL-MCNC: 1.88 MG/DL (ref 0.5–0.9)
EOSINOPHILS ABSOLUTE: 0 K/UL (ref 0–0.7)
EOSINOPHILS RELATIVE PERCENT: 0.2 %
FERRITIN: 27 NG/ML (ref 13–150)
GFR AFRICAN AMERICAN: 31.5
GFR NON-AFRICAN AMERICAN: 26
GLUCOSE BLD-MCNC: 165 MG/DL (ref 60–115)
GLUCOSE BLD-MCNC: 165 MG/DL (ref 70–99)
GLUCOSE BLD-MCNC: 194 MG/DL (ref 60–115)
HCT VFR BLD CALC: 30.6 % (ref 37–47)
HEMOGLOBIN: 9.5 G/DL (ref 12–16)
HYPOCHROMIA: ABNORMAL
IRON SATURATION: 6 % (ref 11–46)
IRON: 22 UG/DL (ref 37–145)
LYMPHOCYTES ABSOLUTE: 1.8 K/UL (ref 1–4.8)
LYMPHOCYTES RELATIVE PERCENT: 26 %
MCH RBC QN AUTO: 22.2 PG (ref 27–31.3)
MCHC RBC AUTO-ENTMCNC: 31.2 % (ref 33–37)
MCV RBC AUTO: 71.2 FL (ref 82–100)
MONOCYTES ABSOLUTE: 0.5 K/UL (ref 0.2–0.8)
MONOCYTES RELATIVE PERCENT: 6.5 %
NEUTROPHILS ABSOLUTE: 4.7 K/UL (ref 1.4–6.5)
NEUTROPHILS RELATIVE PERCENT: 67 %
OVALOCYTES: ABNORMAL
PDW BLD-RTO: 28.5 % (ref 11.5–14.5)
PERFORMED ON: ABNORMAL
PERFORMED ON: ABNORMAL
PLATELET # BLD: 469 K/UL (ref 130–400)
PLATELET SLIDE REVIEW: ABNORMAL
POIKILOCYTES: ABNORMAL
POTASSIUM REFLEX MAGNESIUM: 4.6 MEQ/L (ref 3.4–4.9)
RBC # BLD: 4.3 M/UL (ref 4.2–5.4)
SODIUM BLD-SCNC: 141 MEQ/L (ref 135–144)
TARGET CELLS: ABNORMAL
TOTAL IRON BINDING CAPACITY: 347 UG/DL (ref 178–450)
WBC # BLD: 7 K/UL (ref 4.8–10.8)

## 2020-07-03 PROCEDURE — 83540 ASSAY OF IRON: CPT

## 2020-07-03 PROCEDURE — 85025 COMPLETE CBC W/AUTO DIFF WBC: CPT

## 2020-07-03 PROCEDURE — 6360000002 HC RX W HCPCS: Performed by: INTERNAL MEDICINE

## 2020-07-03 PROCEDURE — 6370000000 HC RX 637 (ALT 250 FOR IP): Performed by: INTERNAL MEDICINE

## 2020-07-03 PROCEDURE — 94640 AIRWAY INHALATION TREATMENT: CPT

## 2020-07-03 PROCEDURE — C9113 INJ PANTOPRAZOLE SODIUM, VIA: HCPCS | Performed by: INTERNAL MEDICINE

## 2020-07-03 PROCEDURE — 36415 COLL VENOUS BLD VENIPUNCTURE: CPT

## 2020-07-03 PROCEDURE — 80048 BASIC METABOLIC PNL TOTAL CA: CPT

## 2020-07-03 PROCEDURE — 2580000003 HC RX 258: Performed by: INTERNAL MEDICINE

## 2020-07-03 PROCEDURE — 83550 IRON BINDING TEST: CPT

## 2020-07-03 PROCEDURE — 94761 N-INVAS EAR/PLS OXIMETRY MLT: CPT

## 2020-07-03 PROCEDURE — 82728 ASSAY OF FERRITIN: CPT

## 2020-07-03 RX ORDER — CARVEDILOL 25 MG/1
50 TABLET ORAL 2 TIMES DAILY
Qty: 60 TABLET | Refills: 3 | Status: ON HOLD | OUTPATIENT
Start: 2020-07-03 | End: 2020-10-20 | Stop reason: SDUPTHER

## 2020-07-03 RX ORDER — NICOTINE 21 MG/24HR
1 PATCH, TRANSDERMAL 24 HOURS TRANSDERMAL DAILY
Qty: 30 PATCH | Refills: 3 | Status: ON HOLD | OUTPATIENT
Start: 2020-07-04 | End: 2020-08-25

## 2020-07-03 RX ADMIN — PAROXETINE HYDROCHLORIDE 40 MG: 20 TABLET, FILM COATED ORAL at 08:15

## 2020-07-03 RX ADMIN — Medication 10 ML: at 08:16

## 2020-07-03 RX ADMIN — HYDRALAZINE HYDROCHLORIDE 100 MG: 100 TABLET, FILM COATED ORAL at 08:15

## 2020-07-03 RX ADMIN — IPRATROPIUM BROMIDE AND ALBUTEROL 1 PUFF: 20; 100 SPRAY, METERED RESPIRATORY (INHALATION) at 07:17

## 2020-07-03 RX ADMIN — PANTOPRAZOLE SODIUM 40 MG: 40 INJECTION, POWDER, FOR SOLUTION INTRAVENOUS at 08:16

## 2020-07-03 RX ADMIN — DIGOXIN 125 MCG: 125 TABLET ORAL at 08:16

## 2020-07-03 RX ADMIN — CARVEDILOL 50 MG: 25 TABLET, FILM COATED ORAL at 08:16

## 2020-07-03 RX ADMIN — IPRATROPIUM BROMIDE AND ALBUTEROL 1 PUFF: 20; 100 SPRAY, METERED RESPIRATORY (INHALATION) at 11:42

## 2020-07-03 ASSESSMENT — PAIN SCALES - GENERAL
PAINLEVEL_OUTOF10: 0
PAINLEVEL_OUTOF10: 0

## 2020-07-03 NOTE — DISCHARGE SUMMARY
Physician Discharge Summary     Patient ID:  Lida Ho  [id number]  14 y.o.  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 female who presented to [facility] ED for complaints of progressively worsening shortness of breath over this past weekend. The patient was seen with family who state that the symptoms started Friday evening and have worsened over the week end. The patient has a known history of COPD and states that she feels similar to other exacerbations. The patient complains of a non productive cough and generalized weakness. She has a known history of anemia, was recently admitted earlier this year and found to have antral erosions and duodenal AVMs and today was found to have a hemoglobin level of 5.6 in the ED. Pt was admitted and transfuse a total of 3U blood. Cardiology and GI were consulted. Pt was aggressively diuresed with IV Lasix. Hgb stabilized. GI performed an endoscopy which did not show any active bleeding and recommended continuing PPI BID. Pt renal function began to decline. Nephrology was consulted and stopped the lasix. Pt was feeling much better. Dyspnea likely multifactorial 2/2 anemia and diastolic CHF. Renal function improved close to her baseline on 7/3 and patient was discharged home in stable and improved condition. ACE discontinued. BB increased and Verapamil added by cardiology. Pt will resume her home PO lasix and aldactone.  Of note, pt glucose did become significantly elevated which was attributed to steroids which she was initially placed on, but patient without signs or symptoms of COPD so they were discontinued and glucose improved with her home insulin regimen. Consults: cardiology, GI and nephrology      Discharge Exam:  Constitutional: Awake and alert in no acute distress. Lying in bed comfortably  Head: Normocephalic, atraumatic  Eyes: EOMI, PERRLA  ENT: moist mucous membranes  Neck: neck supple, trachea midline  Lungs: Good inspiratory effort, CTABL, no wheeze, no rhonchi, no rales  Heart: RRR, normal S1 and S2, no murmurs  GI: Soft, non-distended, non tender, no guarding, no rebound, +BS  MSK: no edema noted  Skin: warm, dry  Psych: appropriate affect       Labs:   Recent Labs     20  2116 20  0550 20  0552   WBC 12.1*  --   --  12.2* 7.0   HGB 8.0*   < > 8.5* 8.0*  8.1* 9.5*   HCT 26.4*   < > 28.5* 26.1*  26.0* 30.6*   *  --   --  440* 469*    < > = values in this interval not displayed. Recent Labs     20  0550 20  0552    135 141   K 4.4 4.2 4.6    99 105   CO2 18* 21 23   BUN 61* 71* 61*   CREATININE 1.92* 2.11* 1.88*   CALCIUM 9.1 8.6 9.3     No results for input(s): AST, ALT, BILIDIR, BILITOT, ALKPHOS in the last 72 hours. No results for input(s): INR in the last 72 hours. No results for input(s): Evan Hodgkins in the last 72 hours. Urinalysis:   Lab Results   Component Value Date    NITRU Negative 2020    WBCUA 6-10 10/23/2019    BACTERIA RARE 10/23/2019    RBCUA 0-2 10/23/2019    BLOODU Negative 2020    SPECGRAV 1.016 2020    GLUCOSEU >=1000 2020       Radiology:   Most recent    Chest CT      WITH CONTRAST:No results found for this or any previous visit. WITHOUT CONTRAST: No results found for this or any previous visit.     CXR      2-view:   Results for orders placed in visit on 20   XR CHEST STANDARD (2 VW)    Narrative Patient MRN: [id number]    : 1945    Age:  75 years    Gender: Female    Order Date: 2020 11:23 AM.     Exam: XR CHEST (2 VW)    Number of Views: 2 Indication:  Shortness of breath chronic cough. Comparison: 10/30/2019    Findings: Bibasilar airspace disease. Cardiac mediastinal silhouette unchanged. Vascular calcifications thoracic aorta. No pneumothorax. Impression Impression:  Suspected bibasilar infiltrate/pneumonia. Portable:   Results for orders placed during the hospital encounter of 06/29/20   XR CHEST PORTABLE    Narrative XR CHEST PORTABLE : 6/29/2020    CLINICAL HISTORY: Chest pain and shortness of breath. COMPARISON: None available. TECHNIQUE: An upright portable AP radiograph of the chest was obtained. FINDINGS:     The heart is mildly enlarged with mild to moderate vascular congestion, small pleural effusions and mild interstitial and airspace infiltrates of the mid to lower lung fields, probably edema. Atypical pneumonia should be excluded clinically. There is no pneumothorax, or displaced fractures identified. Impression PROBABLE MILD TO MODERATE CARDIAC DECOMPENSATION WITH PULMONARY INTERSTITIAL EDEMA AND SMALL PLEURAL EFFUSIONS. ATYPICAL PNEUMONIA SHOULD BE EXCLUDED CLINICALLY. Echo No results found for this or any previous visit.     Disposition: home    In process/preliminary results:  Outstanding Order Results     Date and Time Order Name Status Description    7/3/2020 0552 Iron and TIBC In process     7/3/2020 0552 Ferritin In process     6/29/2020 0727 Culture, Blood 2 Preliminary     6/29/2020 0727 Culture, Blood 1 Preliminary           Patient Instructions:   Current Discharge Medication List      START taking these medications    Details   verapamil (CALAN SR) 120 MG extended release tablet Take 1 tablet by mouth nightly  Qty: 30 tablet, Refills: 3      nicotine (NICODERM CQ) 14 MG/24HR Place 1 patch onto the skin daily  Qty: 30 patch, Refills: 3         CONTINUE these medications which have CHANGED    Details   carvedilol (COREG) 25 MG tablet Take 2 tablets by mouth 2 times daily  Qty: 60 tablet, Refills: 3         CONTINUE these medications which have NOT CHANGED    Details   insulin 70-30 (NOVOLIN 70/30) (70-30) 100 UNIT per ML injection vial 30-40  units twice a day if glucose more than 120  Qty: 2 vial, Refills: 3      ACCU-CHEK PHYLLIS PLUS strip Test 3x daily Dx E11.65  Qty: 100 each, Refills: 3      PARoxetine (PAXIL) 40 MG tablet TAKE 1 TABLET BY MOUTH ONCE DAILY IN THE MORNING  Qty: 90 tablet, Refills: 0      digoxin (LANOXIN) 125 MCG tablet Take 1 tablet by mouth daily  Qty: 90 tablet, Refills: 1    Associated Diagnoses: Essential hypertension      hydrALAZINE (APRESOLINE) 100 MG tablet Take 100 mg by mouth 2 times daily      pantoprazole (PROTONIX) 40 MG tablet Take 40mg twice daily (1st dose 30min before breakfast) for 30 days. After 30 days, you make take 40mg once daily before breakfast.  Qty: 60 tablet, Refills: 3      furosemide (LASIX) 40 MG tablet Take 1 tablet by mouth daily  Qty: 90 tablet, Refills: 0      rosuvastatin (CRESTOR) 40 MG tablet Take 1 tablet by mouth every evening  Qty: 90 tablet, Refills: 0      dicyclomine (BENTYL) 10 MG capsule Take 1 capsule by mouth 4 times daily as needed (abdominal pain)  Qty: 120 capsule, Refills: 3      ondansetron (ZOFRAN) 4 MG tablet Take 1 tablet by mouth every 8 hours as needed for Nausea or Vomiting  Qty: 30 tablet, Refills: 2      spironolactone (ALDACTONE) 25 MG tablet Take 1 tablet by mouth daily  Qty: 30 tablet, Refills: 3         STOP taking these medications       Accu-Chek Softclix Lancets MISC Comments:   Reason for Stopping:         Insulin Syringe-Needle U-100 (INSULIN SYRINGE 1CC/31GX5/16\") 31G X 5/16\" 1 ML MISC Comments:   Reason for Stopping:         lisinopril (PRINIVIL;ZESTRIL) 10 MG tablet Comments:   Reason for Stopping:             Activity: activity as tolerated  Diet: diabetic diet  Wound Care: none needed    Follow-up with Boom Vance MD  in 1 week.     DC time 35 minutes    Signed:  Electronically signed by Rad Weiner DO on 7/3/2020 at 9:49 AM

## 2020-07-03 NOTE — DISCHARGE INSTR - DIET

## 2020-07-03 NOTE — CARE COORDINATION
Spoke with Sasha Hugo RN pt was discharged before I could see pt, reporting she had refused HHC at time of d/c instructions given.  Electronically signed by Naina Olivares RN on 7/3/2020 at 12:36 PM

## 2020-07-03 NOTE — PROGRESS NOTES
Nephrology Progress Note    Assessment:  CKD-3  DM type-2  OHDX   Hypertension  DJD lumbar          Plan:continue with Lasix and aldactone on discharge  Follow with nephrologist  No NSAIDS     Patient Active Problem List:     Hypertension     Hyperlipidemia     Uncontrolled type 2 diabetes mellitus with complication, without long-term current use of insulin (HCC)     Fibromyalgia     Anxiety     Smoker     Insomnia     DJD (degenerative joint disease), lumbar     Lumbosacral radiculopathy at S1     DDD (degenerative disc disease), lumbar     Dysphonia     CTS (carpal tunnel syndrome)     High risk medication use-Norco - 12/20/17 OAR[facility] PM&R, 02/20/18 O[facility] PM&R, 03/07/18 [facility] PM&R, Urine Drug screen negative 02/06/17 PM&R--MED CONTRACT 2/6/17     SOB (shortness of breath) on exertion     Chest pain     Memory deficit     Artificial lens present     Presbyopia     Astigmatism, regular     Cataract, nuclear sclerotic senile     IDDM (insulin dependent diabetes mellitus) ([address]     Regular astigmatism     Nuclear senile cataract     Neck pain     Lumbosacral radiculopathy at L5     Spinal stenosis of lumbar region with neurogenic claudication     Impaired mobility and activities of daily living     Non-compliant patient NO showed FU 1/10/17 Dr Jarvis Hong     Insomnia secondary to chronic pain     Reactive depression     Diabetic radiculopathy (HCC)     Cervical radicular pain     Diabetic asymmetric polyneuropathy (HCC)     Myalgia     Intercostal neuropathy     Osteoarthritis of spine with radiculopathy, lumbar region     Acute combined systolic and diastolic CHF, NYHA class 1 (HCC)     PMB (postmenopausal bleeding)     Acute on chronic diastolic heart failure (HCC)     CHF (congestive heart failure) ([address]     Hypertensive urgency     Uncontrolled type 2 diabetes mellitus with hyperglycemia (HCC)     Chronic obstructive pulmonary disease with acute exacerbation (HCC)     Acute on chronic diastolic (congestive) heart failure (HCC)     SAÚL (acute kidney injury) ([facility] [address]     Hypoglycemia     HOCM (hypertrophic obstructive cardiomyopathy) (HCC)     Gastrointestinal hemorrhage     COPD exacerbation (HCC)     Anemia     AVM (arteriovenous malformation)      Subjective:  Admit Date: 6/29/2020    Interval History: lying flat no SOB    Medications:  Scheduled Meds:   [Held by provider] furosemide  40 mg Oral Daily    insulin lispro  0-12 Units Subcutaneous TID WC    insulin lispro  0-6 Units Subcutaneous Nightly    insulin lispro protamine & lispro  30 Units Subcutaneous BID WC    sodium chloride  500 mL Intravenous Once    carvedilol  50 mg Oral BID    verapamil  120 mg Oral Nightly    sodium chloride flush  10 mL Intravenous 2 times per day    sodium chloride  20 mL Intravenous Once    albuterol-ipratropium  1 puff Inhalation TID    nicotine  1 patch Transdermal Daily    digoxin  125 mcg Oral Daily    PARoxetine  40 mg Oral Daily    rosuvastatin  40 mg Oral QPM    hydrALAZINE  100 mg Oral BID    pantoprazole  40 mg Intravenous BID    And    sodium chloride (PF)  10 mL Intravenous Daily     Continuous Infusions:   dextrose         CBC:   Recent Labs     07/02/20  0550 07/03/20  0552   WBC 12.2* 7.0   HGB 8.0*  8.1* 9.5*   * 469*     CMP:    Recent Labs     07/01/20  0319 07/02/20  0550 07/03/20  0552    135 141   K 4.4 4.2 4.6    99 105   CO2 18* 21 23   BUN 61* 71* 61*   CREATININE 1.92* 2.11* 1.88*   GLUCOSE 422* 367* 165*   CALCIUM 9.1 8.6 9.3   LABGLOM 25.4* 22.8* 26.0*     Troponin: No results for input(s): TROPONINI in the last 72 hours. BNP: No results for input(s): BNP in the last 72 hours. INR: No results for input(s): INR in the last 72 hours. Lipids: No results for input(s): CHOL, LDLDIRECT, TRIG, HDL, AMYLASE, LIPASE in the last 72 hours. Liver: No results for input(s): AST, ALT, ALKPHOS, PROT, LABALBU, BILITOT in the last 72 hours.     Invalid input(s): BILDIR  Iron:  No results for input(s): IRONS, FERRITIN in the last 72 hours. Invalid input(s): LABIRONS  Urinalysis: No results for input(s): UA in the last 72 hours.     Objective:  Vitals: BP (!) 136/48   Pulse 71   Temp 97.5 °F (36.4 °C) (Oral)   Resp 16   Ht 4' 11\" (1.499 m)   Wt 115 lb 11.2 oz (52.5 kg)   LMP  (LMP Unknown)   SpO2 100%   BMI 23.37 kg/m²    Wt Readings from Last 3 Encounters:   07/03/20 115 lb 11.2 oz (52.5 kg)   03/20/20 138 lb 14.4 oz (63 kg)   03/02/20 133 lb (60.3 kg)      24HR INTAKE/OUTPUT:      Intake/Output Summary (Last 24 hours) at 7/3/2020 1010  Last data filed at 7/3/2020 0815  Gross per 24 hour   Intake 1580 ml   Output 1900 ml   Net -320 ml       General: alert, in no apparent distress  HEENT: normocephalic, atraumatic, anicteric  Neck: supple, no mass  Lungs: non-labored respirations, clear to auscultation bilaterally  Heart: regular rate and rhythm, no murmurs or rubs  Abdomen: soft, non-tender, non-distended  Ext: no cyanosis, no peripheral edema decreased pulses  Neuro: alert and oriented, no gross abnormalities  Psych: normal mood and affect  Skin: no rash      Electronically signed by Leydi Mishra MD

## 2020-07-04 LAB
BLOOD CULTURE, ROUTINE: NORMAL
CULTURE, BLOOD 2: NORMAL

## 2020-07-06 ENCOUNTER — CARE COORDINATION (OUTPATIENT)
Dept: CASE MANAGEMENT | Age: 75
End: 2020-07-06

## 2020-07-06 NOTE — CARE COORDINATION
[facility] Trans[facility] Initial Follow Up Call    Call within 2 business days of discharge: Yes    Patient: Abi Vee Patient : 1945   MRN: <[id number]>  Reason for Admission:   Discharge Date: 7/3/20 RARS: Readmission Risk Score: 24      Last Discharge [facility]       Complaint Diagnosis Description Type Department Provider    20 Shortness of Breath COPD exacerbation ([facility] [address] . .. ED to Hosp-Admission (Discharged) (ADMITTED) Barbara 26, DO        Attempted to reach pt for follow up call. Left message requesting call back.     Follow Up  Future Appointments   Date Time Provider Zaid Lr   2020 10:45 AM Rosendo Quezada MD [address] [facility]

## 2020-07-08 ENCOUNTER — OFFICE VISIT (OUTPATIENT)
Dept: CARDIOLOGY CLINIC | Age: 75
End: 2020-07-08
Payer: MEDICARE

## 2020-07-08 VITALS
RESPIRATION RATE: 18 BRPM | WEIGHT: 132 LBS | DIASTOLIC BLOOD PRESSURE: 58 MMHG | HEART RATE: 86 BPM | BODY MASS INDEX: 26.66 KG/M2 | OXYGEN SATURATION: 99 % | SYSTOLIC BLOOD PRESSURE: 122 MMHG

## 2020-07-08 DIAGNOSIS — I50.41 ACUTE COMBINED SYSTOLIC AND DIASTOLIC CHF, NYHA CLASS 1 (HCC): ICD-10-CM

## 2020-07-08 LAB
ANION GAP SERPL CALCULATED.3IONS-SCNC: 13 MEQ/L (ref 9–15)
BUN BLDV-MCNC: 44 MG/DL (ref 8–23)
CALCIUM SERPL-MCNC: 10.5 MG/DL (ref 8.5–9.9)
CHLORIDE BLD-SCNC: 96 MEQ/L (ref 95–107)
CO2: 29 MEQ/L (ref 20–31)
CREAT SERPL-MCNC: 1.54 MG/DL (ref 0.5–0.9)
GFR AFRICAN AMERICAN: 39.6
GFR NON-AFRICAN AMERICAN: 32.8
GLUCOSE BLD-MCNC: 319 MG/DL (ref 70–99)
POTASSIUM SERPL-SCNC: 4.7 MEQ/L (ref 3.4–4.9)
PRO-BNP: 7201 PG/ML
SODIUM BLD-SCNC: 138 MEQ/L (ref 135–144)

## 2020-07-08 PROCEDURE — G8427 DOCREV CUR MEDS BY ELIG CLIN: HCPCS | Performed by: INTERNAL MEDICINE

## 2020-07-08 PROCEDURE — 1036F TOBACCO NON-USER: CPT | Performed by: INTERNAL MEDICINE

## 2020-07-08 PROCEDURE — 1090F PRES/ABSN URINE INCON ASSESS: CPT | Performed by: INTERNAL MEDICINE

## 2020-07-08 PROCEDURE — 99213 OFFICE O/P EST LOW 20 MIN: CPT | Performed by: INTERNAL MEDICINE

## 2020-07-08 PROCEDURE — 3017F COLORECTAL CA SCREEN DOC REV: CPT | Performed by: INTERNAL MEDICINE

## 2020-07-08 PROCEDURE — 4040F PNEUMOC VAC/ADMIN/RCVD: CPT | Performed by: INTERNAL MEDICINE

## 2020-07-08 PROCEDURE — 1111F DSCHRG MED/CURRENT MED MERGE: CPT | Performed by: INTERNAL MEDICINE

## 2020-07-08 PROCEDURE — G8417 CALC BMI ABV UP PARAM F/U: HCPCS | Performed by: INTERNAL MEDICINE

## 2020-07-08 PROCEDURE — G8400 PT W/DXA NO RESULTS DOC: HCPCS | Performed by: INTERNAL MEDICINE

## 2020-07-08 PROCEDURE — 1123F ACP DISCUSS/DSCN MKR DOCD: CPT | Performed by: INTERNAL MEDICINE

## 2020-07-08 NOTE — PROGRESS NOTES
[facility] CARDIOLOGY OFFICE FOLLOW-UP      Patient: Katherin Main  YOB: 1945  MRN: [id number]    Chief Complaint:  Chief Complaint   Patient presents with   [address] from [facility] 6/29-7/3    Congestive Heart Failure    Hypertension       Subjective/HPI    The patient is followed in the cardiology office chronically for the following problems: CHF, HTN    The last encounter focused on the following: hospital consult    Testing/hospitalizations/procedures performed since last encounter: none    Since the last encounter, the patient has not had new symptoms/events. Past Medical History:   Diagnosis Date    Anxiety     Asthma     dx 2019 / has smoked since age 14    CHF (congestive heart failure) (HCC)     Chronic back pain     Bilateral L5 S1 Radic on emg--surprisingly worse on the left than the right--pt's symptoms and her MRI show worse on the right    Chronic obstructive pulmonary disease with acute exacerbation ([address] 10/12/2019    Depression     Fibromyalgia     Hyperlipidemia     meds > 8 yrs    Hypertension     meds > 45 yrs    On home O2     2l per n/c at bedtime mostly,     Osteoarthritis     Type II diabetes mellitus, uncontrolled ([address]     hx > 8 yrs    Unspecified sleep apnea        Past Surgical History:   Procedure Laterality Date    BACK SURGERY  2017    lumbar disc    CARDIAC CATHETERIZATION  11/3/14     DR. MIRELES / no stents    COLONOSCOPY  08/29/2016    w/polypectomy     DILATION AND CURETTAGE OF UTERUS N/A 10/7/2019    EUA HYSTEROSCOPY DILATATION AND CURETTAGE performed by Abelardo Lawton DO at [facility]. [address], COLON, DIAGNOSTIC      EYE SURGERY      Phaco with IOL OU / [address]    umbilical hernia repair    GA ESOPHAGOGASTRODUODENOSCOPY TRANSORAL DIAGNOSTIC N/A 3/24/2017    EGD ESOPHAGOGASTRODUODENOSCOPY performed [facility] Doc Turner MD [facility] 2018    negative findings    ND REVISE MEDIAN N/CARPAL TUNNEL SURG Left 2017    LEFT  CARPAL TUNNEL RELEASE performed by Jax Arellano MD at [facility],9+Y/Y,FRXCQ N/A 2018    TONSILLECTOMY      as child    UPPER GASTROINTESTINAL ENDOSCOPY  2016    w/bx     UPPER GASTROINTESTINAL ENDOSCOPY N/A 2020    EGD possible biopsy performed by Tracy Thompson MD at [place] [address] N/A 2020    EGD PUSH ENTEROSCOPY performed by Tiffany Briggs MD at [facility]       Family History   Problem Relation Age of Onset    Heart Disease Father         cardiac bypass    Arthritis Father     Arthritis Mother     Other Mother          at age 80    Other Sister         [facility] health     No Known Problems Daughter     Stroke Son        Social History     Socioeconomic History    Marital status:      Spouse name: None    Number of children: None    Years of education: None    Highest education level: None   Occupational History    Occupation: Retired-   Social Needs    Financial resource strain: None    Food insecurity     Worry: None     Inability: None    Transportation needs     Medical: None     Non-medical: None   Tobacco Use    Smoking status: Former Smoker     Packs/day: 1.00     Years: 59.00     Pack years: 59.00     Types: Cigarettes     Start date: 2017    Smokeless tobacco: Never Used    Tobacco comment: quit 2-3 weeks ago    Substance and Sexual Activity    Alcohol use: Not Currently    Drug use: No    Sexual activity: Yes   Lifestyle    Physical activity     Days per week: None     Minutes per session: None    Stress: None   Relationships    Social connections     Talks on phone: None     Gets together: None     Attends Confucianism service: None     Active member of club or organization: None     Attends meetings of clubs or organizations: None     Relationship status: None    Intimate partner violence     Fear of current or ex partner: None     Emotionally abused: None     Physically abused: None     Forced sexual activity: None   Other Topics Concern    None   Social History Narrative    None       Allergies   Allergen Reactions    Ibuprofen Nausea Only    Metformin And Related      Diarrhea      Darvon [Propoxyphene Hcl] Nausea And Vomiting       Current Outpatient Medications   Medication Sig Dispense Refill    carvedilol (COREG) 25 MG tablet Take 2 tablets by mouth 2 times daily 60 tablet 3    verapamil (CALAN SR) 120 MG extended release tablet Take 1 tablet by mouth nightly 30 tablet 3    nicotine (NICODERM CQ) 14 MG/24HR Place 1 patch onto the skin daily 30 patch 3    PARoxetine (PAXIL) 40 MG tablet TAKE 1 TABLET BY MOUTH ONCE DAILY IN THE MORNING 90 tablet 0    digoxin (LANOXIN) 125 MCG tablet Take 1 tablet by mouth daily 90 tablet 1    pantoprazole (PROTONIX) 40 MG tablet Take 40mg twice daily (1st dose 30min before breakfast) for 30 days. After 30 days, you make take 40mg once daily before breakfast. 60 tablet 3    furosemide (LASIX) 40 MG tablet Take 1 tablet by mouth daily 90 tablet 0    rosuvastatin (CRESTOR) 40 MG tablet Take 1 tablet by mouth every evening 90 tablet 0    dicyclomine (BENTYL) 10 MG capsule Take 1 capsule by mouth 4 times daily as needed (abdominal pain) 120 capsule 3    ondansetron (ZOFRAN) 4 MG tablet Take 1 tablet by mouth every 8 hours as needed for Nausea or Vomiting 30 tablet 2    ACCU-CHEK PHYLLIS PLUS strip Test 3x daily Dx E11.65 100 each 3    insulin 70-30 (NOVOLIN 70/30) (70-30) 100 UNIT per ML injection vial 30-40  units twice a day if glucose more than 120 2 vial 3    hydrALAZINE (APRESOLINE) 100 MG tablet Take 100 mg by mouth 2 times daily       No current facility-administered medications for this visit. Review of Systems:   Review of Systems   Constitutional: Negative for activity change and appetite change. HENT: Negative for congestion. Respiratory: Negative for apnea, choking and chest tightness. Cardiovascular: Negative for chest pain. Gastrointestinal: Negative for abdominal distention and abdominal pain. Endocrine: Negative for cold intolerance and heat intolerance. Genitourinary: Negative for dysuria and enuresis. Musculoskeletal: Negative for arthralgias and back pain. Skin: Negative for color change. Allergic/Immunologic: Negative. Neurological: Negative for dizziness, seizures, syncope and light-headedness. Psychiatric/Behavioral: Negative for agitation, behavioral problems and confusion. Physical Examination:    BP (!) 122/58 (Site: Right Upper Arm, Position: Sitting, Cuff Size: Medium Adult)   Pulse 86   Resp 18   Wt 132 lb (59.9 kg)   LMP  (LMP Unknown)   SpO2 99%   BMI 26.66 kg/m²    Physical Exam   Constitutional: The patient appears healthy. No distress. HENT: Mouth/Throat: Oropharynx is clear. Eyes: Pupils are equal, round, and reactive to light. Neck: Normal range of motion. No JVD present. Cardiovascular: Regular rhythm, S1 normal, S2 normal, normal heart sounds and intact distal pulses. Exam reveals no gallop. No murmur heard. Pulses:       Radial pulses are 2+ on the right side. Dorsalis pedis pulses are 2+ on the right side. Pulmonary/Chest: Effort normal and breath sounds normal. No wheezes. No rales. No tenderness. Abdominal: Soft. Bowel sounds are normal.   Musculoskeletal: Normal range of motion. No edema. Neurological: The patient is alert and oriented to person, place, and time. Intact cranial nerves. Skin: Skin is warm and dry. No rash noted.        LABS:  CBC:   Lab Results   Component Value Date    WBC 6.7 07/24/2020    RBC 3.65 07/24/2020    HGB 8.0 07/24/2020    HCT 26.8 07/24/2020    MCV 73.4 07/24/2020    MCH 21.8 07/24/2020    MCHC 29.7 07/24/2020    RDW 28.3 07/24/2020     07/24/2020    MPV 8.8 08/01/2015 Lipids:  Lab Results   Component Value Date    CHOL 105 02/24/2020    CHOL 158 10/13/2019    CHOL 155 10/09/2019     Lab Results   Component Value Date    TRIG 67 02/24/2020    TRIG 118 10/13/2019    TRIG 46 10/09/2019     Lab Results   Component Value Date    HDL 42 02/24/2020    HDL 61 (H) 10/13/2019    HDL 52 10/09/2019     Lab Results   Component Value Date    LDLCALC 50 02/24/2020    LDLCALC 73 10/13/2019    LDLCALC 94 10/09/2019     No results found for: LABVLDL, VLDL  Lab Results   Component Value Date    CHOLHDLRATIO 3.4 09/11/2012     CMP:    Lab Results   Component Value Date     07/24/2020    K 4.6 07/24/2020    K 4.6 07/03/2020    CL 99 07/24/2020    CO2 22 07/24/2020    BUN 19 07/24/2020    CREATININE 1.37 07/24/2020    GFRAA 45.4 07/24/2020    LABGLOM 37.5 07/24/2020    GLUCOSE 418 07/24/2020    PROT 6.7 06/29/2020    LABALBU 4.0 07/24/2020    CALCIUM 9.7 07/24/2020    BILITOT 0.3 06/29/2020    ALKPHOS 105 06/29/2020    AST 44 06/29/2020    ALT 22 06/29/2020     BMP:    Lab Results   Component Value Date     07/24/2020    K 4.6 07/24/2020    K 4.6 07/03/2020    CL 99 07/24/2020    CO2 22 07/24/2020    BUN 19 07/24/2020    LABALBU 4.0 07/24/2020    CREATININE 1.37 07/24/2020    CALCIUM 9.7 07/24/2020    GFRAA 45.4 07/24/2020    LABGLOM 37.5 07/24/2020    GLUCOSE 418 07/24/2020     Magnesium:    Lab Results   Component Value Date    MG 2.2 06/29/2020     TSH:  Lab Results   Component Value Date    TSH 1.200 10/13/2019       Patient Active Problem List   Diagnosis    Hypertension    Hyperlipidemia    Uncontrolled type 2 diabetes mellitus with complication, without long-term current use of insulin (HCC)    Fibromyalgia    Anxiety    Smoker    Insomnia    DJD (degenerative joint disease), lumbar    Lumbosacral radiculopathy at S1    DDD (degenerative disc disease), lumbar    Dysphonia    CTS (carpal tunnel syndrome)    High risk medication use-Norco - 12/20/17 OARRS PM&R, 02/20/18 OARRS PM&R, 03/07/18 OARRS PM&R, Urine Drug screen negative 02/06/17 PM&R--MED CONTRACT 2/6/17    SOB (shortness of breath) on exertion    Chest pain    Memory deficit    Artificial lens present    Presbyopia    Astigmatism, regular    Cataract, nuclear sclerotic senile    IDDM (insulin dependent diabetes mellitus) [facility]    Regular astigmatism    Nuclear senile cataract    Neck pain    Lumbosacral radiculopathy at L5    Spinal stenosis of lumbar region with neurogenic claudication    Impaired mobility and activities of daily living    Non-compliant patient NO showed FU 1/10/17 Dr De Jesus Feeling Insomnia secondary to chronic pain    Reactive depression    Diabetic radiculopathy (HCC)    Cervical radicular pain    Diabetic asymmetric polyneuropathy (HCC)    Myalgia    Intercostal neuropathy    Osteoarthritis of spine with radiculopathy, lumbar region    Acute combined systolic and diastolic CHF, NYHA class 1 ([facility]    PMB (postmenopausal bleeding)    Acute on chronic diastolic heart failure (HCC)    CHF (congestive heart failure) ([facility])    Hypertensive urgency    Uncontrolled type 2 diabetes mellitus with hyperglycemia (HCC)    Chronic obstructive pulmonary disease with acute exacerbation (HCC)    Acute on chronic diastolic (congestive) heart failure (HCC)    SAÚL (acute kidney injury) ([facility]    Hypoglycemia    HOCM (hypertrophic obstructive cardiomyopathy) ([facility])    Gastrointestinal hemorrhage    COPD exacerbation ([facility])    Anemia    AVM (arteriovenous malformation)       Medications:  Current Outpatient Medications   Medication Sig Dispense Refill    carvedilol (COREG) 25 MG tablet Take 2 tablets by mouth 2 times daily 60 tablet 3    verapamil (CALAN SR) 120 MG extended release tablet Take 1 tablet by mouth nightly 30 tablet 3    nicotine (NICODERM CQ) 14 MG/24HR Place 1 patch onto the skin daily 30 patch 3    PARoxetine (PAXIL) 40 MG tablet TAKE 1 TABLET BY MOUTH ONCE DAILY

## 2020-07-10 ENCOUNTER — CARE COORDINATION (OUTPATIENT)
Dept: CASE MANAGEMENT | Age: 75
End: 2020-07-10

## 2020-07-20 ENCOUNTER — CARE COORDINATION (OUTPATIENT)
Dept: CASE MANAGEMENT | Age: 75
End: 2020-07-20

## 2020-07-20 NOTE — CARE COORDINATION
[facility] Transitions Follow Up Call    2020    Patient: Marcos Cote  Patient : 1945   MRN: [id number]  Reason for Admission: [address] Acute on chronic diastolic CHF, Acute on chronic blood loss anemia 2/2 AVM, SAÚL, COPD. Discharge Date: 7/3/20 RARS: Readmission Risk Score: 24    Care Transitions request call back to P: [phone] for subsequent follow up. Two call attempts.     Follow Up  Future Appointments   Date Time Provider Zaid Lr   2020 10:30 AM Rio Almonte MD [address]

## 2020-07-21 ENCOUNTER — TELEPHONE (OUTPATIENT)
Dept: FAMILY MEDICINE CLINIC | Age: 75
End: 2020-07-21

## 2020-07-21 NOTE — TELEPHONE ENCOUNTER
Oralia from [place] calls to update you on [place]. She has HX of Hypertension. BP today was 200/78. 176/72 before meds. She is complaining of fatigue and sleeping most of the day. she is type 2 DM her BS this week have ranged for 79- over 500. She is not taking her meds correctly.

## 2020-07-23 RX ORDER — BLOOD SUGAR DIAGNOSTIC
STRIP MISCELLANEOUS
Qty: 100 EACH | Refills: 3 | Status: SHIPPED | OUTPATIENT
Start: 2020-07-23 | End: 2020-09-15 | Stop reason: SDUPTHER

## 2020-07-28 RX ORDER — ROSUVASTATIN CALCIUM 40 MG/1
40 TABLET, COATED ORAL EVERY EVENING
Qty: 90 TABLET | Refills: 0 | Status: SHIPPED | OUTPATIENT
Start: 2020-07-28 | End: 2020-11-10

## 2020-07-29 ENCOUNTER — CARE COORDINATION (OUTPATIENT)
Dept: CASE MANAGEMENT | Age: 75
End: 2020-07-29

## 2020-07-29 NOTE — TELEPHONE ENCOUNTER
Thank you, Dr. Calixto Bedoya! Note prescription sent. Left hippa compliant message for patient. No further outreach planned at this time. CLINICAL PHARMACY CONSULT: MED RECONCILIATION/REVIEW ADDENDUM  For Pharmacy Admin Tracking Only  PHSO: Yes  Total # of Interventions Recommended: 1  - New Order #: 0 New Medication Order Reason(s):  Adherence  - Refills Provided #: 1  - Maintenance Safety Lab Monitoring #: 1  - New Therapy Lab Monitoring #: 1  Recommended intervention potential cost savings: 1  Total Interventions Accepted: 1  Time Spent (min): Mahendra Caldera, [address] [place] [address]
[phone]  [facility], toll free: [phone], option 7

## 2020-08-24 ENCOUNTER — APPOINTMENT (OUTPATIENT)
Dept: CT IMAGING | Age: 75
DRG: 811 | End: 2020-08-24
Payer: MEDICARE

## 2020-08-24 ENCOUNTER — HOSPITAL ENCOUNTER (INPATIENT)
Age: 75
LOS: 3 days | Discharge: HOME | DRG: 811 | End: 2020-08-28
Attending: INTERNAL MEDICINE | Admitting: INTERNAL MEDICINE
Payer: MEDICARE

## 2020-08-24 ENCOUNTER — APPOINTMENT (OUTPATIENT)
Dept: GENERAL RADIOLOGY | Age: 75
DRG: 811 | End: 2020-08-24
Payer: MEDICARE

## 2020-08-24 PROBLEM — D64.9 SYMPTOMATIC ANEMIA: Status: ACTIVE | Noted: 2020-08-24

## 2020-08-24 LAB
ALBUMIN SERPL-MCNC: 4.2 G/DL (ref 3.5–4.6)
ALP BLD-CCNC: 135 U/L (ref 40–130)
ALT SERPL-CCNC: 16 U/L (ref 0–33)
ANION GAP SERPL CALCULATED.3IONS-SCNC: 10 MEQ/L (ref 9–15)
ANISOCYTOSIS: ABNORMAL
AST SERPL-CCNC: 17 U/L (ref 0–35)
BACTERIA: NEGATIVE /HPF
BASOPHILS ABSOLUTE: 0.2 K/UL (ref 0–0.2)
BASOPHILS RELATIVE PERCENT: 2.2 %
BILIRUB SERPL-MCNC: <0.2 MG/DL (ref 0.2–0.7)
BILIRUBIN URINE: NEGATIVE
BLOOD, URINE: NEGATIVE
BUN BLDV-MCNC: 27 MG/DL (ref 8–23)
CALCIUM SERPL-MCNC: 9.4 MG/DL (ref 8.5–9.9)
CHLORIDE BLD-SCNC: 104 MEQ/L (ref 95–107)
CLARITY: CLEAR
CO2: 25 MEQ/L (ref 20–31)
COLOR: YELLOW
CREAT SERPL-MCNC: 1.89 MG/DL (ref 0.5–0.9)
EOSINOPHILS ABSOLUTE: 0.4 K/UL (ref 0–0.7)
EOSINOPHILS RELATIVE PERCENT: 5 %
EPITHELIAL CELLS, UA: NORMAL /HPF (ref 0–5)
GFR AFRICAN AMERICAN: 31.3
GFR NON-AFRICAN AMERICAN: 25.9
GLOBULIN: 2.1 G/DL (ref 2.3–3.5)
GLUCOSE BLD-MCNC: 350 MG/DL (ref 70–99)
GLUCOSE URINE: >=1000 MG/DL
HCT VFR BLD CALC: 23.8 % (ref 37–47)
HEMATOLOGY PATH CONSULT: YES
HEMOGLOBIN: 7.2 G/DL (ref 12–16)
HYALINE CASTS: NORMAL /HPF (ref 0–5)
HYPOCHROMIA: ABNORMAL
KETONES, URINE: NEGATIVE MG/DL
LEUKOCYTE ESTERASE, URINE: ABNORMAL
LYMPHOCYTES ABSOLUTE: 1.9 K/UL (ref 1–4.8)
LYMPHOCYTES RELATIVE PERCENT: 22.8 %
MAGNESIUM: 2.1 MG/DL (ref 1.7–2.4)
MCH RBC QN AUTO: 21 PG (ref 27–31.3)
MCHC RBC AUTO-ENTMCNC: 30.2 % (ref 33–37)
MCV RBC AUTO: 69.4 FL (ref 82–100)
MICROCYTES: ABNORMAL
MONOCYTES ABSOLUTE: 0.7 K/UL (ref 0.2–0.8)
MONOCYTES RELATIVE PERCENT: 8.3 %
NEUTROPHILS ABSOLUTE: 5.1 K/UL (ref 1.4–6.5)
NEUTROPHILS RELATIVE PERCENT: 61.7 %
NITRITE, URINE: NEGATIVE
PDW BLD-RTO: 23.7 % (ref 11.5–14.5)
PH UA: 5 (ref 5–9)
PLATELET # BLD: 439 K/UL (ref 130–400)
PLATELET SLIDE REVIEW: ABNORMAL
POC CREATININE WHOLE BLOOD: 1.89
POIKILOCYTES: ABNORMAL
POLYCHROMASIA: ABNORMAL
POTASSIUM SERPL-SCNC: 4.3 MEQ/L (ref 3.4–4.9)
PROTEIN UA: NEGATIVE MG/DL
RBC # BLD: 3.43 M/UL (ref 4.2–5.4)
RBC UA: NORMAL /HPF (ref 0–5)
SCHISTOCYTES: ABNORMAL
SODIUM BLD-SCNC: 139 MEQ/L (ref 135–144)
SPECIFIC GRAVITY UA: 1.01 (ref 1–1.03)
TARGET CELLS: ABNORMAL
TOTAL PROTEIN: 6.3 G/DL (ref 6.3–8)
TROPONIN: 0.03 NG/ML (ref 0–0.01)
URINE REFLEX TO CULTURE: ABNORMAL
UROBILINOGEN, URINE: 0.2 E.U./DL
WBC # BLD: 8.3 K/UL (ref 4.8–10.8)
WBC UA: NORMAL /HPF (ref 0–5)

## 2020-08-24 PROCEDURE — 81001 URINALYSIS AUTO W/SCOPE: CPT

## 2020-08-24 PROCEDURE — 71046 X-RAY EXAM CHEST 2 VIEWS: CPT

## 2020-08-24 PROCEDURE — 83735 ASSAY OF MAGNESIUM: CPT

## 2020-08-24 PROCEDURE — 70450 CT HEAD/BRAIN W/O DYE: CPT

## 2020-08-24 PROCEDURE — 74176 CT ABD & PELVIS W/O CONTRAST: CPT

## 2020-08-24 PROCEDURE — 99285 EMERGENCY DEPT VISIT HI MDM: CPT

## 2020-08-24 PROCEDURE — 85025 COMPLETE CBC W/AUTO DIFF WBC: CPT

## 2020-08-24 PROCEDURE — 86850 RBC ANTIBODY SCREEN: CPT

## 2020-08-24 PROCEDURE — 36415 COLL VENOUS BLD VENIPUNCTURE: CPT

## 2020-08-24 PROCEDURE — 93005 ELECTROCARDIOGRAM TRACING: CPT | Performed by: PHYSICIAN ASSISTANT

## 2020-08-24 PROCEDURE — 86900 BLOOD TYPING SEROLOGIC ABO: CPT

## 2020-08-24 PROCEDURE — 80053 COMPREHEN METABOLIC PANEL: CPT

## 2020-08-24 PROCEDURE — 36430 TRANSFUSION BLD/BLD COMPNT: CPT

## 2020-08-24 PROCEDURE — 86901 BLOOD TYPING SEROLOGIC RH(D): CPT

## 2020-08-24 PROCEDURE — 84484 ASSAY OF TROPONIN QUANT: CPT

## 2020-08-24 PROCEDURE — P9016 RBC LEUKOCYTES REDUCED: HCPCS

## 2020-08-24 PROCEDURE — 86923 COMPATIBILITY TEST ELECTRIC: CPT

## 2020-08-24 RX ORDER — 0.9 % SODIUM CHLORIDE 0.9 %
20 INTRAVENOUS SOLUTION INTRAVENOUS ONCE
Status: DISCONTINUED | OUTPATIENT
Start: 2020-08-24 | End: 2020-08-28 | Stop reason: HOSPADM

## 2020-08-24 ASSESSMENT — ENCOUNTER SYMPTOMS
VOICE CHANGE: 0
EYE DISCHARGE: 0
VOMITING: 0
ANAL BLEEDING: 0
BACK PAIN: 1
ABDOMINAL DISTENTION: 0
SHORTNESS OF BREATH: 1
PHOTOPHOBIA: 0
APNEA: 0

## 2020-08-24 ASSESSMENT — PAIN DESCRIPTION - FREQUENCY: FREQUENCY: CONTINUOUS

## 2020-08-24 ASSESSMENT — PAIN SCALES - GENERAL: PAINLEVEL_OUTOF10: 10

## 2020-08-24 ASSESSMENT — PAIN DESCRIPTION - ONSET: ONSET: AWAKENED FROM SLEEP

## 2020-08-24 ASSESSMENT — PAIN DESCRIPTION - ORIENTATION: ORIENTATION: RIGHT

## 2020-08-24 ASSESSMENT — PAIN DESCRIPTION - DESCRIPTORS: DESCRIPTORS: CONSTANT;SHARP

## 2020-08-24 ASSESSMENT — PAIN DESCRIPTION - LOCATION: LOCATION: FLANK

## 2020-08-25 ENCOUNTER — APPOINTMENT (OUTPATIENT)
Dept: ULTRASOUND IMAGING | Age: 75
DRG: 811 | End: 2020-08-25
Payer: MEDICARE

## 2020-08-25 ENCOUNTER — APPOINTMENT (OUTPATIENT)
Dept: GENERAL RADIOLOGY | Age: 75
DRG: 811 | End: 2020-08-25
Payer: MEDICARE

## 2020-08-25 ENCOUNTER — APPOINTMENT (OUTPATIENT)
Dept: CT IMAGING | Age: 75
DRG: 811 | End: 2020-08-25
Payer: MEDICARE

## 2020-08-25 PROBLEM — N18.9 ACUTE ON CHRONIC KIDNEY FAILURE (HCC): Status: ACTIVE | Noted: 2020-08-25

## 2020-08-25 PROBLEM — N17.9 ACUTE ON CHRONIC KIDNEY FAILURE (HCC): Status: ACTIVE | Noted: 2020-08-25

## 2020-08-25 PROBLEM — J81.0 FLASH PULMONARY EDEMA (HCC): Status: ACTIVE | Noted: 2020-08-25

## 2020-08-25 LAB
ABO/RH: NORMAL
ABO/RH: NORMAL
ALBUMIN SERPL-MCNC: 4.1 G/DL (ref 3.5–4.6)
ALBUMIN SERPL-MCNC: 4.2 G/DL (ref 3.5–4.6)
ALP BLD-CCNC: 111 U/L (ref 40–130)
ALP BLD-CCNC: 118 U/L (ref 40–130)
ALT SERPL-CCNC: 18 U/L (ref 0–33)
ALT SERPL-CCNC: 20 U/L (ref 0–33)
ANION GAP SERPL CALCULATED.3IONS-SCNC: 17 MEQ/L (ref 9–15)
ANION GAP SERPL CALCULATED.3IONS-SCNC: 20 MEQ/L (ref 9–15)
ANISOCYTOSIS: ABNORMAL
ANISOCYTOSIS: ABNORMAL
ANTIBODY SCREEN: NORMAL
ANTIBODY SCREEN: NORMAL
APTT: 27.8 SEC (ref 24.4–36.8)
AST SERPL-CCNC: 20 U/L (ref 0–35)
AST SERPL-CCNC: 38 U/L (ref 0–35)
BACTERIA: NEGATIVE /HPF
BASE EXCESS ARTERIAL: -1 (ref -3–3)
BASOPHILS ABSOLUTE: 0 K/UL (ref 0–0.2)
BASOPHILS ABSOLUTE: 0.1 K/UL (ref 0–0.2)
BASOPHILS RELATIVE PERCENT: 0.2 %
BASOPHILS RELATIVE PERCENT: 1 %
BILIRUB SERPL-MCNC: 0.3 MG/DL (ref 0.2–0.7)
BILIRUB SERPL-MCNC: 0.3 MG/DL (ref 0.2–0.7)
BILIRUBIN URINE: NEGATIVE
BLOOD BANK DISPENSE STATUS: NORMAL
BLOOD BANK PRODUCT CODE: NORMAL
BLOOD, URINE: NEGATIVE
BPU ID: NORMAL
BUN BLDV-MCNC: 30 MG/DL (ref 8–23)
BUN BLDV-MCNC: 45 MG/DL (ref 8–23)
CALCIUM IONIZED: 1.32 MMOL/L (ref 1.12–1.32)
CALCIUM SERPL-MCNC: 9.4 MG/DL (ref 8.5–9.9)
CALCIUM SERPL-MCNC: 9.5 MG/DL (ref 8.5–9.9)
CHLORIDE BLD-SCNC: 101 MEQ/L (ref 95–107)
CHLORIDE BLD-SCNC: 97 MEQ/L (ref 95–107)
CLARITY: CLEAR
CO2: 17 MEQ/L (ref 20–31)
CO2: 18 MEQ/L (ref 20–31)
COLOR: YELLOW
CREAT SERPL-MCNC: 1.46 MG/DL (ref 0.5–0.9)
CREAT SERPL-MCNC: 1.94 MG/DL (ref 0.5–0.9)
DESCRIPTION BLOOD BANK: NORMAL
EOSINOPHILS ABSOLUTE: 0 K/UL (ref 0–0.7)
EOSINOPHILS ABSOLUTE: 0.1 K/UL (ref 0–0.7)
EOSINOPHILS RELATIVE PERCENT: 0 %
EOSINOPHILS RELATIVE PERCENT: 0.6 %
EPITHELIAL CELLS, UA: NORMAL /HPF (ref 0–5)
GFR AFRICAN AMERICAN: 30.4
GFR AFRICAN AMERICAN: 33
GFR AFRICAN AMERICAN: 42.1
GFR NON-AFRICAN AMERICAN: 25.1
GFR NON-AFRICAN AMERICAN: 27
GFR NON-AFRICAN AMERICAN: 34.8
GLOBULIN: 2.7 G/DL (ref 2.3–3.5)
GLOBULIN: 2.9 G/DL (ref 2.3–3.5)
GLUCOSE BLD-MCNC: 225 MG/DL (ref 60–115)
GLUCOSE BLD-MCNC: 261 MG/DL (ref 60–115)
GLUCOSE BLD-MCNC: 355 MG/DL (ref 70–99)
GLUCOSE BLD-MCNC: 420 MG/DL (ref 70–99)
GLUCOSE BLD-MCNC: 484 MG/DL (ref 60–115)
GLUCOSE BLD-MCNC: 485 MG/DL (ref 60–115)
GLUCOSE BLD-MCNC: 498 MG/DL (ref 60–115)
GLUCOSE BLD-MCNC: 527 MG/DL (ref 60–115)
GLUCOSE URINE: >=1000 MG/DL
HCO3 ARTERIAL: 24.4 MMOL/L (ref 21–29)
HCT VFR BLD CALC: 24 % (ref 37–47)
HCT VFR BLD CALC: 24.6 % (ref 37–47)
HCT VFR BLD CALC: 24.9 % (ref 37–47)
HCT VFR BLD CALC: 26.6 % (ref 37–47)
HEMATOLOGY PATH CONSULT: NORMAL
HEMOGLOBIN: 7.1 G/DL (ref 12–16)
HEMOGLOBIN: 7.2 G/DL (ref 12–16)
HEMOGLOBIN: 7.3 G/DL (ref 12–16)
HEMOGLOBIN: 7.6 G/DL (ref 12–16)
HEMOGLOBIN: 8.6 GM/DL (ref 12–16)
HYALINE CASTS: NORMAL /HPF (ref 0–5)
HYPOCHROMIA: ABNORMAL
HYPOCHROMIA: ABNORMAL
INR BLD: 1
KETONES, URINE: NEGATIVE MG/DL
LACTATE: 0.42 MMOL/L (ref 0.4–2)
LACTIC ACID, SEPSIS: 5.2 MMOL/L (ref 0.5–1.9)
LACTIC ACID, SEPSIS: 6.3 MMOL/L (ref 0.5–1.9)
LACTIC ACID: 1.3 MMOL/L (ref 0.5–2.2)
LEUKOCYTE ESTERASE, URINE: ABNORMAL
LYMPHOCYTES ABSOLUTE: 0.7 K/UL (ref 1–4.8)
LYMPHOCYTES ABSOLUTE: 1 K/UL (ref 1–4.8)
LYMPHOCYTES RELATIVE PERCENT: 7 %
LYMPHOCYTES RELATIVE PERCENT: 8.2 %
MAGNESIUM: 2 MG/DL (ref 1.7–2.4)
MCH RBC QN AUTO: 20.7 PG (ref 27–31.3)
MCH RBC QN AUTO: 20.7 PG (ref 27–31.3)
MCHC RBC AUTO-ENTMCNC: 28.6 % (ref 33–37)
MCHC RBC AUTO-ENTMCNC: 29.6 % (ref 33–37)
MCV RBC AUTO: 69.9 FL (ref 82–100)
MCV RBC AUTO: 72.4 FL (ref 82–100)
MICROCYTES: ABNORMAL
MICROCYTES: ABNORMAL
MONOCYTES ABSOLUTE: 0.1 K/UL (ref 0.2–0.8)
MONOCYTES ABSOLUTE: 0.1 K/UL (ref 0.2–0.8)
MONOCYTES RELATIVE PERCENT: 0.6 %
MONOCYTES RELATIVE PERCENT: 0.8 %
NEUTROPHILS ABSOLUTE: 10.3 K/UL (ref 1.4–6.5)
NEUTROPHILS ABSOLUTE: 9 K/UL (ref 1.4–6.5)
NEUTROPHILS RELATIVE PERCENT: 89.4 %
NEUTROPHILS RELATIVE PERCENT: 92.2 %
NITRITE, URINE: NEGATIVE
O2 SAT, ARTERIAL: 99 % (ref 93–100)
OVALOCYTES: ABNORMAL
OVALOCYTES: ABNORMAL
PCO2 ARTERIAL: 41 MM HG (ref 35–45)
PDW BLD-RTO: 23.5 % (ref 11.5–14.5)
PDW BLD-RTO: 23.8 % (ref 11.5–14.5)
PERFORMED ON: ABNORMAL
PH ARTERIAL: 7.38 (ref 7.35–7.45)
PH UA: 5.5 (ref 5–9)
PLATELET # BLD: 332 K/UL (ref 130–400)
PLATELET # BLD: 478 K/UL (ref 130–400)
PLATELET SLIDE REVIEW: NORMAL
PO2 ARTERIAL: 125 MM HG (ref 75–108)
POC CHLORIDE: 108 MEQ/L (ref 99–110)
POC CREATININE: 1.8 MG/DL (ref 0.6–1.2)
POC FIO2: 8
POC HEMATOCRIT: 25 % (ref 36–48)
POC POTASSIUM: 3.9 MEQ/L (ref 3.5–5.1)
POC SAMPLE TYPE: ABNORMAL
POC SODIUM: 139 MEQ/L (ref 136–145)
POIKILOCYTES: ABNORMAL
POIKILOCYTES: ABNORMAL
POLYCHROMASIA: ABNORMAL
POLYCHROMASIA: ABNORMAL
POTASSIUM SERPL-SCNC: 4.7 MEQ/L (ref 3.4–4.9)
POTASSIUM SERPL-SCNC: 4.8 MEQ/L (ref 3.4–4.9)
PRO-BNP: 788 PG/ML
PROTEIN UA: NEGATIVE MG/DL
PROTHROMBIN TIME: 13.2 SEC (ref 12.3–14.9)
RBC # BLD: 3.43 M/UL (ref 4.2–5.4)
RBC # BLD: 3.67 M/UL (ref 4.2–5.4)
RBC UA: NORMAL /HPF (ref 0–5)
SLIDE REVIEW: ABNORMAL
SODIUM BLD-SCNC: 134 MEQ/L (ref 135–144)
SODIUM BLD-SCNC: 136 MEQ/L (ref 135–144)
SPECIFIC GRAVITY UA: 1.01 (ref 1–1.03)
SPHEROCYTES: ABNORMAL
TCO2 ARTERIAL: 26 (ref 22–29)
TOTAL PROTEIN: 6.9 G/DL (ref 6.3–8)
TOTAL PROTEIN: 7 G/DL (ref 6.3–8)
TROPONIN: 0.01 NG/ML (ref 0–0.01)
TROPONIN: 0.02 NG/ML (ref 0–0.01)
TROPONIN: 0.02 NG/ML (ref 0–0.01)
TROPONIN: 0.03 NG/ML (ref 0–0.01)
TROPONIN: 0.03 NG/ML (ref 0–0.01)
UROBILINOGEN, URINE: 0.2 E.U./DL
WBC # BLD: 11.5 K/UL (ref 4.8–10.8)
WBC # BLD: 9.7 K/UL (ref 4.8–10.8)
WBC UA: NORMAL /HPF (ref 0–5)

## 2020-08-25 PROCEDURE — 6370000000 HC RX 637 (ALT 250 FOR IP): Performed by: INTERNAL MEDICINE

## 2020-08-25 PROCEDURE — 87040 BLOOD CULTURE FOR BACTERIA: CPT

## 2020-08-25 PROCEDURE — 83605 ASSAY OF LACTIC ACID: CPT

## 2020-08-25 PROCEDURE — 2580000003 HC RX 258: Performed by: NURSE PRACTITIONER

## 2020-08-25 PROCEDURE — 85014 HEMATOCRIT: CPT

## 2020-08-25 PROCEDURE — 85730 THROMBOPLASTIN TIME PARTIAL: CPT

## 2020-08-25 PROCEDURE — 99223 1ST HOSP IP/OBS HIGH 75: CPT | Performed by: INTERNAL MEDICINE

## 2020-08-25 PROCEDURE — 71250 CT THORAX DX C-: CPT

## 2020-08-25 PROCEDURE — 93005 ELECTROCARDIOGRAM TRACING: CPT | Performed by: INTERNAL MEDICINE

## 2020-08-25 PROCEDURE — 6360000002 HC RX W HCPCS: Performed by: NURSE PRACTITIONER

## 2020-08-25 PROCEDURE — 84295 ASSAY OF SERUM SODIUM: CPT

## 2020-08-25 PROCEDURE — 36430 TRANSFUSION BLD/BLD COMPNT: CPT

## 2020-08-25 PROCEDURE — 80053 COMPREHEN METABOLIC PANEL: CPT

## 2020-08-25 PROCEDURE — 2500000003 HC RX 250 WO HCPCS: Performed by: NEUROMUSCULOSKELETAL MEDICINE, SPORTS MEDICINE

## 2020-08-25 PROCEDURE — 93005 ELECTROCARDIOGRAM TRACING: CPT | Performed by: NEUROMUSCULOSKELETAL MEDICINE, SPORTS MEDICINE

## 2020-08-25 PROCEDURE — 6370000000 HC RX 637 (ALT 250 FOR IP): Performed by: NEUROMUSCULOSKELETAL MEDICINE, SPORTS MEDICINE

## 2020-08-25 PROCEDURE — 93880 EXTRACRANIAL BILAT STUDY: CPT | Performed by: INTERNAL MEDICINE

## 2020-08-25 PROCEDURE — 93010 ELECTROCARDIOGRAM REPORT: CPT | Performed by: INTERNAL MEDICINE

## 2020-08-25 PROCEDURE — 1210000000 HC MED SURG R&B

## 2020-08-25 PROCEDURE — 96375 TX/PRO/DX INJ NEW DRUG ADDON: CPT

## 2020-08-25 PROCEDURE — P9016 RBC LEUKOCYTES REDUCED: HCPCS

## 2020-08-25 PROCEDURE — C9113 INJ PANTOPRAZOLE SODIUM, VIA: HCPCS | Performed by: NURSE PRACTITIONER

## 2020-08-25 PROCEDURE — 70450 CT HEAD/BRAIN W/O DYE: CPT

## 2020-08-25 PROCEDURE — 86901 BLOOD TYPING SEROLOGIC RH(D): CPT

## 2020-08-25 PROCEDURE — 86850 RBC ANTIBODY SCREEN: CPT

## 2020-08-25 PROCEDURE — 81001 URINALYSIS AUTO W/SCOPE: CPT

## 2020-08-25 PROCEDURE — 99222 1ST HOSP IP/OBS MODERATE 55: CPT | Performed by: PSYCHIATRY & NEUROLOGY

## 2020-08-25 PROCEDURE — 6360000002 HC RX W HCPCS

## 2020-08-25 PROCEDURE — 96374 THER/PROPH/DIAG INJ IV PUSH: CPT

## 2020-08-25 PROCEDURE — 85018 HEMOGLOBIN: CPT

## 2020-08-25 PROCEDURE — 83010 ASSAY OF HAPTOGLOBIN QUANT: CPT

## 2020-08-25 PROCEDURE — 71045 X-RAY EXAM CHEST 1 VIEW: CPT

## 2020-08-25 PROCEDURE — 6360000002 HC RX W HCPCS: Performed by: NEUROMUSCULOSKELETAL MEDICINE, SPORTS MEDICINE

## 2020-08-25 PROCEDURE — 93005 ELECTROCARDIOGRAM TRACING: CPT | Performed by: NURSE PRACTITIONER

## 2020-08-25 PROCEDURE — 2500000003 HC RX 250 WO HCPCS: Performed by: INTERNAL MEDICINE

## 2020-08-25 PROCEDURE — 36415 COLL VENOUS BLD VENIPUNCTURE: CPT

## 2020-08-25 PROCEDURE — 84484 ASSAY OF TROPONIN QUANT: CPT

## 2020-08-25 PROCEDURE — 83880 ASSAY OF NATRIURETIC PEPTIDE: CPT

## 2020-08-25 PROCEDURE — 82803 BLOOD GASES ANY COMBINATION: CPT

## 2020-08-25 PROCEDURE — 82565 ASSAY OF CREATININE: CPT

## 2020-08-25 PROCEDURE — 86923 COMPATIBILITY TEST ELECTRIC: CPT

## 2020-08-25 PROCEDURE — 84132 ASSAY OF SERUM POTASSIUM: CPT

## 2020-08-25 PROCEDURE — 85384 FIBRINOGEN ACTIVITY: CPT

## 2020-08-25 PROCEDURE — 85025 COMPLETE CBC W/AUTO DIFF WBC: CPT

## 2020-08-25 PROCEDURE — 82330 ASSAY OF CALCIUM: CPT

## 2020-08-25 PROCEDURE — 85610 PROTHROMBIN TIME: CPT

## 2020-08-25 PROCEDURE — 83735 ASSAY OF MAGNESIUM: CPT

## 2020-08-25 PROCEDURE — 82435 ASSAY OF BLOOD CHLORIDE: CPT

## 2020-08-25 PROCEDURE — 2500000003 HC RX 250 WO HCPCS

## 2020-08-25 PROCEDURE — 2580000003 HC RX 258: Performed by: PSYCHIATRY & NEUROLOGY

## 2020-08-25 PROCEDURE — 85385 FIBRINOGEN ANTIGEN: CPT

## 2020-08-25 PROCEDURE — 93880 EXTRACRANIAL BILAT STUDY: CPT

## 2020-08-25 PROCEDURE — 86900 BLOOD TYPING SEROLOGIC ABO: CPT

## 2020-08-25 RX ORDER — METHYLPREDNISOLONE SODIUM SUCCINATE 125 MG/2ML
125 INJECTION, POWDER, LYOPHILIZED, FOR SOLUTION INTRAMUSCULAR; INTRAVENOUS ONCE
Status: COMPLETED | OUTPATIENT
Start: 2020-08-25 | End: 2020-08-25

## 2020-08-25 RX ORDER — MORPHINE SULFATE 2 MG/ML
2 INJECTION, SOLUTION INTRAMUSCULAR; INTRAVENOUS ONCE
Status: DISCONTINUED | OUTPATIENT
Start: 2020-08-25 | End: 2020-08-25

## 2020-08-25 RX ORDER — ONDANSETRON 2 MG/ML
4 INJECTION INTRAMUSCULAR; INTRAVENOUS EVERY 6 HOURS PRN
Status: DISCONTINUED | OUTPATIENT
Start: 2020-08-25 | End: 2020-08-28 | Stop reason: HOSPADM

## 2020-08-25 RX ORDER — OLANZAPINE 10 MG/1
5 INJECTION, POWDER, LYOPHILIZED, FOR SOLUTION INTRAMUSCULAR
Status: COMPLETED | OUTPATIENT
Start: 2020-08-25 | End: 2020-08-25

## 2020-08-25 RX ORDER — SODIUM CHLORIDE 0.9 % (FLUSH) 0.9 %
10 SYRINGE (ML) INJECTION PRN
Status: DISCONTINUED | OUTPATIENT
Start: 2020-08-25 | End: 2020-08-28 | Stop reason: HOSPADM

## 2020-08-25 RX ORDER — DIPHENHYDRAMINE HYDROCHLORIDE 50 MG/ML
50 INJECTION INTRAMUSCULAR; INTRAVENOUS ONCE
Status: COMPLETED | OUTPATIENT
Start: 2020-08-25 | End: 2020-08-25

## 2020-08-25 RX ORDER — CARVEDILOL 25 MG/1
50 TABLET ORAL 2 TIMES DAILY
Status: DISCONTINUED | OUTPATIENT
Start: 2020-08-25 | End: 2020-08-28 | Stop reason: HOSPADM

## 2020-08-25 RX ORDER — SODIUM CHLORIDE 0.9 % (FLUSH) 0.9 %
10 SYRINGE (ML) INJECTION PRN
Status: DISCONTINUED | OUTPATIENT
Start: 2020-08-25 | End: 2020-08-27 | Stop reason: SDUPTHER

## 2020-08-25 RX ORDER — SODIUM CHLORIDE 9 MG/ML
10 INJECTION INTRAVENOUS EVERY 12 HOURS
Status: DISCONTINUED | OUTPATIENT
Start: 2020-08-25 | End: 2020-08-28 | Stop reason: HOSPADM

## 2020-08-25 RX ORDER — PANTOPRAZOLE SODIUM 40 MG/10ML
40 INJECTION, POWDER, LYOPHILIZED, FOR SOLUTION INTRAVENOUS EVERY 12 HOURS
Status: DISCONTINUED | OUTPATIENT
Start: 2020-08-25 | End: 2020-08-28 | Stop reason: HOSPADM

## 2020-08-25 RX ORDER — IPRATROPIUM BROMIDE AND ALBUTEROL SULFATE 2.5; .5 MG/3ML; MG/3ML
1 SOLUTION RESPIRATORY (INHALATION)
Status: DISCONTINUED | OUTPATIENT
Start: 2020-08-25 | End: 2020-08-25

## 2020-08-25 RX ORDER — ROSUVASTATIN CALCIUM 10 MG/1
40 TABLET, COATED ORAL EVERY EVENING
Status: DISCONTINUED | OUTPATIENT
Start: 2020-08-25 | End: 2020-08-28 | Stop reason: HOSPADM

## 2020-08-25 RX ORDER — FUROSEMIDE 10 MG/ML
INJECTION INTRAMUSCULAR; INTRAVENOUS
Status: DISPENSED
Start: 2020-08-25 | End: 2020-08-25

## 2020-08-25 RX ORDER — ACETAMINOPHEN 325 MG/1
650 TABLET ORAL EVERY 6 HOURS PRN
Status: DISCONTINUED | OUTPATIENT
Start: 2020-08-25 | End: 2020-08-28 | Stop reason: HOSPADM

## 2020-08-25 RX ORDER — HYDRALAZINE HYDROCHLORIDE 100 MG/1
100 TABLET, FILM COATED ORAL 2 TIMES DAILY
Status: DISCONTINUED | OUTPATIENT
Start: 2020-08-25 | End: 2020-08-28 | Stop reason: HOSPADM

## 2020-08-25 RX ORDER — ALBUTEROL SULFATE 2.5 MG/3ML
SOLUTION RESPIRATORY (INHALATION)
Status: DISPENSED
Start: 2020-08-25 | End: 2020-08-25

## 2020-08-25 RX ORDER — SODIUM CHLORIDE 0.9 % (FLUSH) 0.9 %
10 SYRINGE (ML) INJECTION EVERY 12 HOURS SCHEDULED
Status: DISCONTINUED | OUTPATIENT
Start: 2020-08-25 | End: 2020-08-28 | Stop reason: HOSPADM

## 2020-08-25 RX ORDER — 0.9 % SODIUM CHLORIDE 0.9 %
20 INTRAVENOUS SOLUTION INTRAVENOUS ONCE
Status: DISCONTINUED | OUTPATIENT
Start: 2020-08-25 | End: 2020-08-28 | Stop reason: HOSPADM

## 2020-08-25 RX ORDER — DEXTROSE MONOHYDRATE 50 MG/ML
100 INJECTION, SOLUTION INTRAVENOUS PRN
Status: DISCONTINUED | OUTPATIENT
Start: 2020-08-25 | End: 2020-08-28 | Stop reason: HOSPADM

## 2020-08-25 RX ORDER — LABETALOL 20 MG/4 ML (5 MG/ML) INTRAVENOUS SYRINGE
Status: COMPLETED
Start: 2020-08-25 | End: 2020-08-25

## 2020-08-25 RX ORDER — METHYLPREDNISOLONE SODIUM SUCCINATE 125 MG/2ML
INJECTION, POWDER, LYOPHILIZED, FOR SOLUTION INTRAMUSCULAR; INTRAVENOUS
Status: DISPENSED
Start: 2020-08-25 | End: 2020-08-25

## 2020-08-25 RX ORDER — ACETAMINOPHEN 650 MG/1
650 SUPPOSITORY RECTAL EVERY 6 HOURS PRN
Status: DISCONTINUED | OUTPATIENT
Start: 2020-08-25 | End: 2020-08-28 | Stop reason: HOSPADM

## 2020-08-25 RX ORDER — PROMETHAZINE HYDROCHLORIDE 12.5 MG/1
12.5 TABLET ORAL EVERY 6 HOURS PRN
Status: DISCONTINUED | OUTPATIENT
Start: 2020-08-25 | End: 2020-08-28 | Stop reason: HOSPADM

## 2020-08-25 RX ORDER — MORPHINE SULFATE 2 MG/ML
4 INJECTION, SOLUTION INTRAMUSCULAR; INTRAVENOUS ONCE
Status: DISCONTINUED | OUTPATIENT
Start: 2020-08-25 | End: 2020-08-25

## 2020-08-25 RX ORDER — NICOTINE POLACRILEX 4 MG
15 LOZENGE BUCCAL PRN
Status: DISCONTINUED | OUTPATIENT
Start: 2020-08-25 | End: 2020-08-28 | Stop reason: HOSPADM

## 2020-08-25 RX ORDER — MORPHINE SULFATE 2 MG/ML
6 INJECTION, SOLUTION INTRAMUSCULAR; INTRAVENOUS ONCE
Status: DISCONTINUED | OUTPATIENT
Start: 2020-08-25 | End: 2020-08-28 | Stop reason: HOSPADM

## 2020-08-25 RX ORDER — SODIUM CHLORIDE 0.9 % (FLUSH) 0.9 %
10 SYRINGE (ML) INJECTION EVERY 12 HOURS SCHEDULED
Status: DISCONTINUED | OUTPATIENT
Start: 2020-08-25 | End: 2020-08-27 | Stop reason: SDUPTHER

## 2020-08-25 RX ORDER — FUROSEMIDE 10 MG/ML
80 INJECTION INTRAMUSCULAR; INTRAVENOUS ONCE
Status: COMPLETED | OUTPATIENT
Start: 2020-08-25 | End: 2020-08-25

## 2020-08-25 RX ORDER — IPRATROPIUM BROMIDE AND ALBUTEROL SULFATE 2.5; .5 MG/3ML; MG/3ML
SOLUTION RESPIRATORY (INHALATION)
Status: DISPENSED
Start: 2020-08-25 | End: 2020-08-25

## 2020-08-25 RX ORDER — DEXTROSE MONOHYDRATE 25 G/50ML
12.5 INJECTION, SOLUTION INTRAVENOUS PRN
Status: DISCONTINUED | OUTPATIENT
Start: 2020-08-25 | End: 2020-08-28 | Stop reason: HOSPADM

## 2020-08-25 RX ORDER — ALBUTEROL SULFATE 2.5 MG/3ML
SOLUTION RESPIRATORY (INHALATION)
Status: COMPLETED
Start: 2020-08-25 | End: 2020-08-25

## 2020-08-25 RX ADMIN — PANTOPRAZOLE SODIUM 40 MG: 40 INJECTION, POWDER, FOR SOLUTION INTRAVENOUS at 06:29

## 2020-08-25 RX ADMIN — CARVEDILOL 50 MG: 25 TABLET, FILM COATED ORAL at 21:56

## 2020-08-25 RX ADMIN — INSULIN LISPRO 12 UNITS: 100 INJECTION, SOLUTION INTRAVENOUS; SUBCUTANEOUS at 08:43

## 2020-08-25 RX ADMIN — CARVEDILOL 50 MG: 25 TABLET, FILM COATED ORAL at 10:29

## 2020-08-25 RX ADMIN — METHYLPREDNISOLONE SODIUM SUCCINATE 125 MG: 125 INJECTION, POWDER, FOR SOLUTION INTRAMUSCULAR; INTRAVENOUS at 02:36

## 2020-08-25 RX ADMIN — INSULIN LISPRO 12 UNITS: 100 INJECTION, SOLUTION INTRAVENOUS; SUBCUTANEOUS at 17:25

## 2020-08-25 RX ADMIN — Medication 10 ML: at 21:56

## 2020-08-25 RX ADMIN — DIPHENHYDRAMINE HYDROCHLORIDE 50 MG: 50 INJECTION INTRAMUSCULAR; INTRAVENOUS at 02:58

## 2020-08-25 RX ADMIN — NITROGLYCERIN 1 INCH: 20 OINTMENT TOPICAL at 02:55

## 2020-08-25 RX ADMIN — ALBUTEROL SULFATE: 2.5 SOLUTION RESPIRATORY (INHALATION) at 02:35

## 2020-08-25 RX ADMIN — PANTOPRAZOLE SODIUM 40 MG: 40 INJECTION, POWDER, FOR SOLUTION INTRAVENOUS at 18:53

## 2020-08-25 RX ADMIN — Medication 10 ML: at 08:28

## 2020-08-25 RX ADMIN — FAMOTIDINE 20 MG: 10 INJECTION INTRAVENOUS at 03:05

## 2020-08-25 RX ADMIN — Medication 10 ML: at 21:57

## 2020-08-25 RX ADMIN — FUROSEMIDE 80 MG: 10 INJECTION, SOLUTION INTRAVENOUS at 02:38

## 2020-08-25 RX ADMIN — ROSUVASTATIN CALCIUM 40 MG: 10 TABLET, FILM COATED ORAL at 18:52

## 2020-08-25 RX ADMIN — Medication 10 ML: at 06:30

## 2020-08-25 RX ADMIN — INSULIN LISPRO 30 UNITS: 100 INJECTION, SUSPENSION SUBCUTANEOUS at 11:11

## 2020-08-25 RX ADMIN — HYDRALAZINE HYDROCHLORIDE 100 MG: 100 TABLET, FILM COATED ORAL at 10:29

## 2020-08-25 RX ADMIN — LABETALOL 20 MG/4 ML (5 MG/ML) INTRAVENOUS SYRINGE: at 03:25

## 2020-08-25 RX ADMIN — HYDRALAZINE HYDROCHLORIDE 100 MG: 100 TABLET, FILM COATED ORAL at 21:56

## 2020-08-25 RX ADMIN — OLANZAPINE 5 MG: 10 INJECTION, POWDER, FOR SOLUTION INTRAMUSCULAR at 15:30

## 2020-08-25 RX ADMIN — INSULIN LISPRO 12 UNITS: 100 INJECTION, SOLUTION INTRAVENOUS; SUBCUTANEOUS at 12:31

## 2020-08-25 RX ADMIN — VERAPAMIL HYDROCHLORIDE 120 MG: 120 TABLET, FILM COATED, EXTENDED RELEASE ORAL at 21:56

## 2020-08-25 RX ADMIN — Medication 10 ML: at 18:58

## 2020-08-25 RX ADMIN — INSULIN LISPRO 30 UNITS: 100 INJECTION, SUSPENSION SUBCUTANEOUS at 17:25

## 2020-08-25 ASSESSMENT — ENCOUNTER SYMPTOMS
SHORTNESS OF BREATH: 0
CHEST TIGHTNESS: 0
COUGH: 0
WHEEZING: 0
GASTROINTESTINAL NEGATIVE: 1
PHOTOPHOBIA: 0
SORE THROAT: 0
NAUSEA: 0
BLOOD IN STOOL: 0
CHOKING: 0
ALLERGIC/IMMUNOLOGIC NEGATIVE: 1
VOMITING: 0
ABDOMINAL PAIN: 0
RHINORRHEA: 0
EYES NEGATIVE: 1
SHORTNESS OF BREATH: 1
COLOR CHANGE: 0
TROUBLE SWALLOWING: 0
BACK PAIN: 0
STRIDOR: 0

## 2020-08-25 ASSESSMENT — PAIN SCALES - GENERAL
PAINLEVEL_OUTOF10: 0

## 2020-08-25 NOTE — ED NOTES
Pt having slurred speech and not responding correctly to questions. Pt not following commands. Pt arms turned outward at this time. Dr. Bennett Salazar aware.       Sweta Colbert RN  08/25/20 4854

## 2020-08-25 NOTE — PROGRESS NOTES
0730  - received bedside report and nursing handoff of care from night shift nurse. Patient is awake and alert and oriented x 4 at this time. Hypertensive. Denies any pain. Sitting up in bed.     0825- Accucheck now 484, repeat 494 reported to Dr. Juan Perez via perfect serve. Patient's BP now 179/62 , home medications have been reconciled but not reordered at this time notified Dr. Juan Perez via perfect serve     4059- UA collected and sent to lab. Sanchez catheter removed, patient tolerated well. 0696 - multidisciplinary team for morning rounds. Patient story provided along with current assessment. Dr. Barry Cunningham to assess patient. Patient has no complaints , sitting up eating breakfast at this time. In good spirits. 2983- Dr. Avery Hirsch perfect served regarding patient's BP and need for home meds to be reordered. He will be here to see patient    0- Dr. Avery Hirsch at bedside - patient does not need to be on telemetry. Patient assigned to  [id number]. Patient and  made aware . 1105- gave report to 4 w nurse. Patient belongings packed and  is at bedside with patient    [phone]- just now received the 70/30 from pharmacy and gave the ordered 30 units. one touch is 522 , reported to Dr. Juan Perez  - blood glucose level will need be rechecked 90 minutes from now per Dr. Juan Perez    0199-patient had an episode of stress incontinence and needed to change her underwear, she was out of bed on the toilet when the  Patient's  left the room, when he left the patient became tearful and started breathing quickly stating \" I'm always messing up \" . RN encouraged patient to get back into bed, lay down and take deep breaths. RN rubbed patient's back as patient was breathing deeply and she stopped crying. Transport arrived and took patient to 4 w via wheelchair. Patient specific insulin sent to 4w via tube station. [facility] RN on 4w updated.

## 2020-08-25 NOTE — PROGRESS NOTES
Pt became very agitated, not directable with the avasys. Began to walk around in the hallway, attempt to go in other rooms, hit Charday (PCA) 3 times. [facility] called. Dr. Neetu Carranza ordered Zyprexa, I was able to give this to her. She is now in her room talking with nursing director Jo.

## 2020-08-25 NOTE — ED NOTES
Dr. Clementina Payne at bedside to assess patient d/t suspected blood transfusion reaction. New orders received.      Ajay Mauro RN  08/25/20 9936

## 2020-08-25 NOTE — ED NOTES
Blood Consent signed by patient and Dr. Lorraine Minaya and witnessed by this RN. Consent placed in chart.       Lawrence Coronado RN  08/25/20 0020

## 2020-08-25 NOTE — CONSULTS
Pulmonary and Critical Care Medicine  Consult Note  Encounter Date: 2020 10:06 AM    Ms. Katiana Capone is a 67 y.o. female  : 1945  Requesting Provider: Alex Chester MD    Reason for request: ICU management            HISTORY OF PRESENT ILLNESS:    Patient is 67 y.o. presents with 1 day history of worsening shortness of breath, she has not been feeling good for 2 days, however yesterday she felt more short of breath, unable to talk well, she denies chest pain, she did report lower abdominal pain nagging in nature, 9 out of 10, radiates to her back, no fever or chills, no chest pain, no wheezing, no coughing, no prior sick contact. She came to emergency room, she was noted to be anemic on the start of blood transfusion patient started having shortness of breath and severe chest pain with and became unresponsive with altered mentation. She is back at her baseline except for some difficulty finding words. She did receive appropriate treatment with steroids, antihistamine, and fluid. Past Medical History:        Diagnosis Date    Anxiety     Asthma     dx  / has smoked since age 14    CHF (congestive heart failure) (HCC)     Chronic back pain     Bilateral L5 S1 Radic on emg--surprisingly worse on the left than the right--pt's symptoms and her MRI show worse on the right    Chronic obstructive pulmonary disease with acute exacerbation ([address] 10/12/2019    Depression     Fibromyalgia     Hyperlipidemia     meds > 8 yrs    Hypertension     meds > 45 yrs    On home O2     2l per n/c at bedtime mostly,     Osteoarthritis     Type II diabetes mellitus, uncontrolled ([address]     hx > 8 yrs    Unspecified sleep apnea        Past Surgical History:        Procedure Laterality Date    BACK SURGERY  2017    lumbar disc    CARDIAC CATHETERIZATION  11/3/14     DR. MIRELES / no stents    COLONOSCOPY  2016    w/polypectomy     DILATION AND CURETTAGE OF UTERUS N/A 10/7/2019 EUA HYSTEROSCOPY DILATATION AND CURETTAGE performed by Braulio Piña DO at [facility]. [address], COLON, DIAGNOSTIC      EYE SURGERY      Phaco with IOL OU / [address]  [address]    umbilical hernia repair    GA ESOPHAGOGASTRODUODENOSCOPY TRANSORAL DIAGNOSTIC N/A 3/24/2017    EGD ESOPHAGOGASTRODUODENOSCOPY performed by Viki Edwards MD at [facility] N/A 2018    negative findings    GA REVISE MEDIAN N/CARPAL TUNNEL SURG Left 2017    LEFT  CARPAL TUNNEL RELEASE performed by Nora Ch MD at [facility],8+N/D,KIGJF N/A 2018    TONSILLECTOMY      as child    UPPER GASTROINTESTINAL ENDOSCOPY  2016    w/bx     UPPER GASTROINTESTINAL ENDOSCOPY N/A 2020    EGD possible biopsy performed by Sadie Heath MD at [address] [place] [address] 2020    EGD PUSH ENTEROSCOPY performed by Sheldon Sims MD at [facility] History:     reports that she has quit smoking. Her smoking use included cigarettes. She started smoking about 2 years ago. She has a 59.00 pack-year smoking history. She has never used smokeless tobacco. She reports previous alcohol use. She reports that she does not use drugs.     Family History:       Problem Relation Age of Onset    Heart Disease Father         cardiac bypass    Arthritis Father     Arthritis Mother     Other Mother          at age 80    Other Sister         [facility] health     No Known Problems Daughter     Stroke Son        Allergies:  Ibuprofen; Metformin and related; and Darvon [propoxyphene hcl]        MEDICATIONS during current hospitalization:    Continuous Infusions:   dextrose         Scheduled Meds:   sodium chloride  20 mL Intravenous Once    methylPREDNISolone sodium        furosemide        furosemide        ipratropium-albuterol        albuterol        morphine  6 mg Intravenous Once   Briana Angeles sodium chloride flush  10 mL Intravenous 2 times per day    pantoprazole  40 mg Intravenous Q12H    And    sodium chloride (PF)  10 mL Intravenous Q12H    insulin lispro  0-12 Units Subcutaneous TID WC    insulin lispro  0-6 Units Subcutaneous Nightly    carvedilol  50 mg Oral BID    verapamil  120 mg Oral Nightly    rosuvastatin  40 mg Oral QPM    insulin lispro protamine & lispro  30 Units Subcutaneous BID WC    hydrALAZINE  100 mg Oral BID    sodium chloride  20 mL Intravenous Once       PRN Meds:sodium chloride flush, acetaminophen **OR** acetaminophen, promethazine **OR** ondansetron, glucose, dextrose, glucagon (rDNA), dextrose        REVIEW OF SYSTEMS:  ROS: 10 organs review of system is done including general, psychological, ENT, hematological, endocrine, respiratory, cardiovascular, gastrointestinal, musculoskeletal, neurological,  allergy and Immunology is done and is otherwise negative. PHYSICAL EXAM:    Vitals:  BP (!) 198/159   Pulse 92   Temp 98.1 °F (36.7 °C) (Oral)   Resp 19   Ht 4' 11\" (1.499 m)   Wt 130 lb (59 kg)   LMP  (LMP Unknown)   SpO2 100%   BMI 26.26 kg/m²     General: alert, cooperative, no distress  Head: normocephalic, atraumatic  Eyes:No gross abnormalities. ENT:  MMM no lesions  Neck:  supple and no masses  Chest : clear to auscultation bilaterally- no wheezes, rales or rhonchi, normal air movement, no respiratory distress  Heart[de-identified] Heart sounds are normal.  Regular rate and rhythm without murmur, gallop or rub. ABD:  symmetric, soft, non-tender  Musculoskeletal : no cyanosis, no clubbing and no edema  Neuro: Moves all 4 extremities, tongue is heavy but no slurred speech. Skin: No rashes or nodules noted.   Lymph node:  no cervical nodes  Urology: No Sanchez   Psychiatric: appropriate        Data Review  Recent Labs     08/24/20  2100 08/25/20  0318 08/25/20  0613   WBC 8.3  --  11.5*   HGB 7.2* 8.6* 7.6*   HCT 23.8*  --  26.6*   *  --  332      Recent Labs 08/24/20 2115 08/25/20 0318 08/25/20 0613     --  136   K 4.3  --  4.7     --  101   CO2 25  --  18*   BUN 27*  --  30*   CREATININE 1.89* 1.8* 1.46*   GLUCOSE 350*  --  355*       MV Settings:           ABGs:   Recent Labs     08/25/20 0318   PHART 7.380   IOS3XWO 41   PO2ART 125*   SHA6UHH 24.4   BEART -1   X8ZCDVYM 99*   DIM9QRX 26     O2 Device: None (Room air)  O2 Flow Rate (L/min): 0 L/min  Lab Results   Component Value Date    LACTA 1.3 08/25/2020    LACTA 4.9 06/29/2020    LACTA 0.9 06/29/2020       Radiology  I personally reviewed imaging studies and CT chest no mass or nodule, slightly prominent station R 4 lymph node with fat tissue in it indicating benign nature,        Assessment, plan:   Patient is at risk due to    · Episode of shortness of breath and chest tightness could be bronchospasm currently improving  · Heavy speech and episode of confusion, possibly hypoperfusion or hypoxemic related event currently improving  · Increased troponin likely demand ischemia  · History of smoking quit 2 months ago been a smoker for long time up to 1 pack/day     Recommendation  · Monitor clinically  · Consider brain MRI, will defer to neurology  · Will need outpatient pulmonary work-up including sleep study and PFT  · Home O2 eval prior to discharge  · Monitor blood sugar target 1 40-1 80  · Transfer to floor today      Thank you for consultation    Electronically signed by Woo Ramírez MD, [facility],  on 8/25/2020 at 10:06 AM

## 2020-08-25 NOTE — CONSULTS
Consults    Patient Name: Darlene Nuñez  Admit Date: 2020  8:02 PM  MR #: [id number]  : 1945    Attending Physician: Marion Benjamin MD  Reason for consult: CAD MS changes confuson Anemia    History of Presenting Illness:      Darlene Nuñez is a 67 y.o. female on hospital day 0 with a history of . History Obtained From:  patient, electronic medical record    Pt admitted with recent onset weakness and no enrgy at all. No cp but had SOB. She was also apparently had periods of confusion. She has CAD and HOCM. She has h/o Gastric AVMs. She was found to be anemic this time. With onset of transfusion she apparently had adverse reaction and therfore stopped after only 25 ml of PRBC. She started to have CP severe SOB and MS changes. History:      EKG:SR  Past Medical History:   Diagnosis Date    Anxiety     Asthma     dx  / has smoked since age 14    CHF (congestive heart failure) (HCC)     Chronic back pain     Bilateral L5 S1 Radic on emg--surprisingly worse on the left than the right--pt's symptoms and her MRI show worse on the right    Chronic obstructive pulmonary disease with acute exacerbation ([address] 10/12/2019    Depression     Fibromyalgia     Hyperlipidemia     meds > 8 yrs    Hypertension     meds > 45 yrs    On home O2     2l per n/c at bedtime mostly,     Osteoarthritis     Type II diabetes mellitus, uncontrolled ([address]     hx > 8 yrs    Unspecified sleep apnea      Past Surgical History:   Procedure Laterality Date    BACK SURGERY  2017    lumbar disc    CARDIAC CATHETERIZATION  11/3/14     DR. MIRELES / no stents    COLONOSCOPY  2016    w/polypectomy     DILATION AND CURETTAGE OF UTERUS N/A 10/7/2019    EUA HYSTEROSCOPY DILATATION AND CURETTAGE performed by Tevin Law DO at [address] [place] [address] ENDOSCOPY, COLON, DIAGNOSTIC      EYE SURGERY      [place] with IOL OU / [facility]  [id number]    umbilical hernia repair    CO ESOPHAGOGASTRODUODENOSCOPY TRANSORAL DIAGNOSTIC N/A 3/24/2017    EGD ESOPHAGOGASTRODUODENOSCOPY performed [facility] Momo Vincent MD at [facility] N/A 2018    negative findings    MA REVISE MEDIAN N/CARPAL TUNNEL SURG Left 2017    LEFT  CARPAL TUNNEL RELEASE performed by Dany Bell MD at [facility],4+M/K,TAW[facility] N/A 2018    TONSILLECTOMY      as child    UPPER GASTROINTESTINAL ENDOSCOPY  2016    w/bx     UPPER GASTROINTESTINAL ENDOSCOPY N/A 2020    EGD possible biopsy performed by Claudell Demark, MD at [address] N/A 2020    EGD PUSH ENTEROSCOPY performed by Janette Pressley MD at [facility]       Family History  Family History   Problem Relation Age of Onset    Heart Disease Father         cardiac bypass    Arthritis Father     Arthritis Mother     Other Mother          at age 80    Other Sister         [facility]    No Known Problems Daughter     Stroke Son      [] Unable to obtain due to ventilated and/ or neurologic status    Social History     Socioeconomic History    Marital status:      Spouse name: Not on file    Number of children: Not on file    Years of education: Not on file    Highest education level: Not on file   Occupational History    Occupation: Retired-   Social Needs    Financial resource strain: Not on file    Food insecurity     Worry: Not on file     Inability: Not on file   [employer] needs     Medical: Not on file     Non-medical: Not on file   Tobacco Use    Smoking status: Former Smoker     Packs/day: 1.00     Years: 59.00     Pack years: 59.00     Types: Cigarettes     Start date: 2017    Smokeless tobacco: Never Used    Tobacco comment: quit 2-3 weeks ago    Substance and Sexual Activity    Alcohol use: Not Currently    Drug use: No    Sexual activity: Yes   Lifestyle    30 days, you make take 40mg once daily before breakfast.  dicyclomine (BENTYL) 10 MG capsule, Take 1 capsule by mouth 4 times daily as needed (abdominal pain)  ondansetron (ZOFRAN) 4 MG tablet, Take 1 tablet by mouth every 8 hours as needed for Nausea or Vomiting    Current Hospital Medications:     Scheduled Meds:   sodium chloride  20 mL Intravenous Once    methylPREDNISolone sodium        furosemide        furosemide        ipratropium-albuterol        albuterol        morphine  6 mg Intravenous Once    sodium chloride flush  10 mL Intravenous 2 times per day    pantoprazole  40 mg Intravenous Q12H    And    sodium chloride (PF)  10 mL Intravenous Q12H    insulin lispro  0-12 Units Subcutaneous TID WC    insulin lispro  0-6 Units Subcutaneous Nightly    carvedilol  50 mg Oral BID    verapamil  120 mg Oral Nightly    rosuvastatin  40 mg Oral QPM    insulin lispro protamine & lispro  30 Units Subcutaneous BID WC    hydrALAZINE  100 mg Oral BID    sodium chloride flush  10 mL Intravenous 2 times per day    sodium chloride  20 mL Intravenous Once     Continuous Infusions:   dextrose       PRN Meds:.sodium chloride flush, acetaminophen **OR** acetaminophen, promethazine **OR** ondansetron, glucose, dextrose, glucagon (rDNA), dextrose, sodium chloride flush  .  dextrose          Allergies: Allergies   Allergen Reactions    Ibuprofen Nausea Only    Metformin And Related      Diarrhea      Darvon [Propoxyphene Hcl] Nausea And Vomiting        Review of Systems:       Review of Systems   Constitutional: Positive for fatigue. Negative for diaphoresis. HENT: Negative. Eyes: Negative. Respiratory: Positive for shortness of breath. Negative for cough, chest tightness, wheezing and stridor. Cardiovascular: Positive for chest pain. Negative for palpitations and leg swelling. Gastrointestinal: Negative. Negative for blood in stool and nausea. Genitourinary: Negative. Musculoskeletal: Negative. Skin: Negative. Neurological: Positive for weakness. Negative for dizziness, syncope and light-headedness. Hematological: Negative. Psychiatric/Behavioral: Negative. Objective Findings:     Vitals:/84   Pulse 105   Temp 98.1 °F (36.7 °C) (Oral)   Resp 30   Ht 4' 11\" (1.499 m)   Wt 130 lb (59 kg)   LMP  (LMP Unknown)   SpO2 100%   BMI 26.26 kg/m²      Physical Examination:    Physical Exam   Constitutional: She appears healthy. No distress. HENT:   Normal cephalic and Atraumatic   Eyes: Pupils are equal, round, and reactive to light. Neck: Normal range of motion and thyroid normal. Neck supple. No JVD present. No neck adenopathy. No thyromegaly present. Cardiovascular: Normal rate, regular rhythm, intact distal pulses and normal pulses. Murmur heard. Pulmonary/Chest: Effort normal and breath sounds normal. She has no wheezes. She has no rales. She exhibits no tenderness. Abdominal: Soft. Bowel sounds are normal. There is no abdominal tenderness. Musculoskeletal: Normal range of motion. General: No tenderness or edema. Neurological: She is alert and oriented to person, place, and time. Skin: Skin is warm. No cyanosis. Nails show no clubbing.          Results/ Medications reviewed 8/25/2020, 10:55 AM     Laboratory, Microbiology, Pathology, Radiology, Cardiology, Medications and Transcriptions reviewed  Scheduled Meds:   sodium chloride  20 mL Intravenous Once    methylPREDNISolone sodium        furosemide        furosemide        ipratropium-albuterol        albuterol        morphine  6 mg Intravenous Once    sodium chloride flush  10 mL Intravenous 2 times per day    pantoprazole  40 mg Intravenous Q12H    And    sodium chloride (PF)  10 mL Intravenous Q12H    insulin lispro  0-12 Units Subcutaneous TID     insulin lispro  0-6 Units Subcutaneous Nightly    carvedilol  50 mg Oral BID    verapamil  120 mg Oral Nightly edema ([facility] [J81.0] 08/25/2020     Priority: Low    Acute on chronic kidney failure ([facility] [N17.9, N18.9] 08/25/2020     Priority: Low    Dysarthria [R47.1]      Priority: Low    Chronic fatigue [R53.82]      Priority: Low    Symptomatic anemia [D64.9] 08/24/2020     Priority: Low    Uncontrolled type 2 diabetes mellitus with hyperglycemia ([facility] [E11.65]      Priority: Low         Impression/Plan:   1. Anemia  2. H/o Gastric AVM  3. MS changes  4. Milld CAD  5. H/o HOCM   6. CKD with elevated Trops. - no active angina. 9. Temo Trent for trasfer to Floor. 8. Will follow. 9. Continue CV protective meds  10. Will review CUS     Thank you for allowing us to participate in the care of this patient. Will continue to follow. Please call if questions or concerns arise.     Electronically signed by Dank Hernandez MD on 8/25/2020 at 10:55 AM

## 2020-08-25 NOTE — H&P
[facility]  MEDICINE    HISTORY AND PHYSICAL EXAM    PATIENT NAME:  Samy Vargas    MRN:  [id number]  SERVICE DATE:  8/25/2020   SERVICE TIME:  5:23 AM    Primary Care Physician: Blanca Cancino MD         SUBJECTIVE  CHIEF COMPLAINT: Bilateral flank pain, bilateral upper abdominal pain, and fatigue    HPI:  This is a 67 y.o. female who presents with bilateral flank pain and upper abdominal pain bilaterally with fatigue for 2 to 3 days increasing more in the 24 hours prior to arrival in the ED. patient says that she found it difficult to concentrate or even express herself and was short of breath at times. Patient says that she knows she has anemia and was sure she needed a blood transfusion. Patient says they have done an intense work-up trying to find where she is bleeding and they find nothing. She adamantly refused a Hemoccult test for GI bleed. She states \"I just want to have the blood transfusion and go home in the morning\". Patient has follow-up visit with her primary care physician, endocrinologist, and GYN this week. When patient was receiving blood transfusion in the ED she had a sudden onset of shortness of breath and difficulty breathing. She was then transferred to the ICU for possible flash pulmonary edema. I reassessed her in the ICU and she denied any chest pain, abd pain, and stated that she was feeling much better. And at this time her lungs sounded clear with good air movement.   PAST MEDICAL HISTORY:    Past Medical History:   Diagnosis Date    Anxiety     Asthma     dx 2019 / has smoked since age 14    CHF (congestive heart failure) (HCC)     Chronic back pain     Bilateral L5 S1 Radic on emg--surprisingly worse on the left than the right--pt's symptoms and her MRI show worse on the right    Chronic obstructive pulmonary disease with acute exacerbation ([facility] 10/12/2019    Depression     Fibromyalgia     Hyperlipidemia     meds > 8 yrs    Hypertension     meds > 45 yrs    On home O2     2l per n/c at bedtime mostly,     Osteoarthritis     Type II diabetes mellitus, uncontrolled ([address]     hx > 8 yrs    Unspecified sleep apnea      PAST SURGICAL HISTORY:    Past Surgical History:   Procedure Laterality Date    BACK SURGERY  2017    lumbar disc    CARDIAC CATHETERIZATION  11/3/14     DR. MIRELES / no stents    COLONOSCOPY  2016    w/polypectomy     DILATION AND CURETTAGE OF UTERUS N/A 10/7/2019    EUA HYSTEROSCOPY DILATATION AND CURETTAGE performed by Naina Mckeon DO at [facility]. [address], COLON, DIAGNOSTIC      EYE SURGERY      [place] with IOL OU / [address]  [address]    umbilical hernia repair    OK ESOPHAGOGASTRODUODENOSCOPY TRANSORAL DIAGNOSTIC N/A 3/24/2017    EGD ESOPHAGOGASTRODUODENOSCOPY performed [facility] Gusman MD at [facility] N/A 2018    negative findings    OK REVISE MEDIAN N/CARPAL TUNNEL SURG Left 2017    LEFT  CARPAL TUNNEL RELEASE performed [facility] Madelaine [facility]her MD [facility] [place] [address]    TONSILLECTOMY      as child    UPPER GASTROINTESTINAL ENDOSCOPY  2016    w/bx     UPPER GASTROINTESTINAL ENDOSCOPY N/A 2020    EGD possible biopsy performed by Jasmina Flood MD at [address] N/A 2020    EGD PUSH ENTEROSCOPY performed by Belgica Magdaleno MD at [address] [facility]:    Family History   Problem Relation Age of Onset    Heart Disease Father         cardiac bypass    Arthritis Father     Arthritis Mother     Other Mother          at age 80    Other [facility]         [facility]    No Known Problems Daughter     Stroke Son      SOCIAL HISTORY:    Social History     Socioeconomic History    Marital status:      Spouse name: Not on file    Number of children: Not on file    Years of education: Not on file    Highest education level: Not on file   Occupational History    Occupation: Retired-   Social Needs    Financial resource strain: Not on file    Food insecurity     Worry: Not on file     Inability: Not on file   [employer] needs     Medical: Not on file     Non-medical: Not on file   Tobacco Use    Smoking status: Former Smoker     Packs/day: 1.00     Years: 59.00     Pack years: 59.00     Types: Cigarettes     Start date: 11/30/2017    Smokeless tobacco: Never Used    Tobacco comment: quit 2-3 weeks ago    Substance and Sexual Activity    Alcohol use: Not Currently    Drug use: No    Sexual activity: Yes   Lifestyle    Physical activity     Days per week: Not on file     Minutes per session: Not on file    Stress: Not on file   Relationships    Social connections     Talks on phone: Not on file     Gets together: Not on file     Attends [employer] service: Not on file     Active member of club or organization: Not on file     Attends meetings of clubs or organizations: Not on file     Relationship status: Not on file    Intimate partner violence     Fear of current or ex partner: Not on file     Emotionally abused: Not on file     Physically abused: Not on file     Forced sexual activity: Not on file   Other Topics Concern    Not on file   Social History Narrative    Not on file     MEDICATIONS:   Prior to Admission medications    Medication Sig Start Date End Date Taking?  Authorizing Provider   rosuvastatin (CRESTOR) 40 MG tablet Take 1 tablet by mouth every evening 7/28/20  Yes Keshawn Mercedes MD   insulin 70-30 (NOVOLIN 70/30) (70-30) 100 UNIT per ML injection vial 30-40  units twice a day if glucose more than 120 7/23/20  Yes Lyle Scott MD   carvedilol (COREG) 25 MG tablet Take 2 tablets by mouth 2 times daily 7/3/20  Yes [facility] DO   verapamil (CALAN SR) 120 MG extended release tablet Take 1 tablet by mouth nightly 7/3/20  Yes Joe Ceron,    PARoxetine (PAXIL) 40 MG tablet TAKE 1 TABLET BY MOUTH ONCE DAILY IN THE MORNING 5/11/20  Yes Dru Estes MD   digoxin [facility]) 125 MCG tablet Take 1 tablet by mouth daily 5/11/20  Yes Chichi Carrasco MD   hydrALAZINE (APRESOLINE) 100 MG tablet Take 100 mg by mouth 2 times daily   Yes Heath Martines MD   furosemide (LASIX) 40 MG tablet Take 1 tablet by mouth daily 2/20/20  Yes Dru Estes MD   ACCU-CHEK PHYLLIS PLUS strip Test 3x daily Dx E11.65 7/23/20   Foster Bravo MD   nicotine (NICODERM CQ) 14 MG/24HR Place 1 patch onto the skin daily 7/4/20   [facility] DO   pantoprazole (PROTONIX) 40 MG tablet Take 40mg twice daily (1st dose 30min before breakfast) for 30 days. After 30 days, you make take 40mg once daily before breakfast. 2/26/20   Salvador Schmidt,    dicyclomine (BENTYL) 10 MG capsule Take 1 capsule by mouth 4 times daily as needed (abdominal pain) 12/9/19   JOHNATHON He CNP   ondansetron (ZOFRAN) 4 MG tablet Take 1 tablet by mouth every 8 hours as needed for Nausea or Vomiting 12/9/19   JOHNATHON Armstrong CNP       ALLERGIES: Ibuprofen; Metformin and related; and Darvon [propoxyphene hcl]    REVIEW OF SYSTEM:   Review of Systems   Constitutional: Positive for activity change and fatigue. Negative for appetite change, fever and unexpected weight change. HENT: Negative for congestion, rhinorrhea and sore throat. Eyes: Negative. Respiratory: Positive for shortness of breath. Negative for cough and wheezing. Cardiovascular: Negative for chest pain, palpitations and leg swelling. Gastrointestinal: Negative for abdominal pain (Bilat upper abd), nausea and vomiting. Endocrine: Negative. Genitourinary: Positive for flank pain (Bilat). Negative for difficulty urinating, dysuria, enuresis, menstrual problem, pelvic pain, vaginal bleeding, vaginal discharge and vaginal pain. Musculoskeletal: Negative for back pain. Skin: Negative. Allergic/Immunologic: Negative. Specific Gravity, UA 1.014 1.005 - 1.030    Blood, Urine Negative Negative    pH, UA 5.0 5.0 - 9.0    Protein, UA Negative Negative mg/dL    Urobilinogen, Urine 0.2 <2.0 E.U./dL    Nitrite, Urine Negative Negative    Leukocyte Esterase, Urine TRACE (A) Negative    Urine Reflex to Culture Not Indicated    Microscopic Urinalysis    Collection Time: 08/24/20  8:45 PM   Result Value Ref Range    Bacteria, UA Negative Negative /HPF    Hyaline Casts, UA 1-3 0 - 5 /HPF    WBC, UA 3-5 0 - 5 /HPF    RBC, UA 0-2 0 - 5 /HPF    Epithelial Cells, UA 3-5 0 - 5 /HPF   CBC Auto Differential    Collection Time: 08/24/20  9:00 PM   Result Value Ref Range    WBC 8.3 4.8 - 10.8 K/uL    RBC 3.43 (L) 4.20 - 5.40 M/uL    Hemoglobin 7.2 (L) 12.0 - 16.0 g/dL    Hematocrit 23.8 (L) 37.0 - 47.0 %    MCV 69.4 (L) 82.0 - 100.0 fL    MCH 21.0 (L) 27.0 - 31.3 pg    MCHC 30.2 (L) 33.0 - 37.0 %    RDW 23.7 (H) 11.5 - 14.5 %    Platelets 386 (H) 341 - 400 K/uL    PLATELET SLIDE REVIEW Increased     Path Consult Yes     Neutrophils % 61.7 %    Lymphocytes % 22.8 %    Monocytes % 8.3 %    Eosinophils % 5.0 %    Basophils % 2.2 %    Neutrophils Absolute 5.1 1.4 - 6.5 K/uL    Lymphocytes Absolute 1.9 1.0 - 4.8 K/uL    Monocytes Absolute 0.7 0.2 - 0.8 K/uL    Eosinophils Absolute 0.4 0.0 - 0.7 K/uL    Basophils Absolute 0.2 0.0 - 0.2 K/uL    Anisocytosis 1+     Microcytes 1+     Polychromasia 1+     Hypochromia 2+     Poikilocytes 1+     Schistocytes 1+     Target Cells 1+    Comprehensive Metabolic Panel    Collection Time: 08/24/20  9:15 PM   Result Value Ref Range    Sodium 139 135 - 144 mEq/L    Potassium 4.3 3.4 - 4.9 mEq/L    Chloride 104 95 - 107 mEq/L    CO2 25 20 - 31 mEq/L    Anion Gap 10 9 - 15 mEq/L    Glucose 350 (H) 70 - 99 mg/dL    BUN 27 (H) 8 - 23 mg/dL    CREATININE 1.89 (H) 0.50 - 0.90 mg/dL    GFR Non-African American 25.9 (L) >60    GFR  31.3 (L) >60    Calcium 9.4 8.5 - 9.9 mg/dL    Total Protein 6.3 6.3 - 8.0 g/dL American 27 (A) >60    GFR  33 (A) >60    Calcium, Ion 1.32 1.12 - 1.32 mmol/L    pH, Arterial 7.380 7.350 - 7.450    pCO2, Arterial 41 35 - 45 mm Hg    pO2, Arterial 125 (HH) 75 - 108 mm Hg    HCO3, Arterial 24.4 21.0 - 29.0 mmol/L    Base Excess, Arterial -1 -3 - 3    O2 Sat, Arterial 99 (HH) 93 - 100 %    TCO2, Arterial 26 22 - 29    Lactate 0.42 0.40 - 2.00 mmol/L    POC Hematocrit 25 (L) 36 - 48 %    Hemoglobin 8.6 (L) 12.0 - 16.0 gm/dL    FIO2 8.000     Sample Type ART     Performed on SEE BELOW    Troponin    Collection Time: 08/25/20  4:15 AM   Result Value Ref Range    Troponin 0.026 (HH) 0.000 - 0.010 ng/mL       IMAGING:  No results found. VTE Prophylaxis: None r/t Bleeding risk    ASSESSMENT AND PLAN  Principal Problem:   1) Symptomatic anemia: Hemoglobin 7. 2. history of anemia. Bilateral flank pain, bilateral upper abdominal pain. Shortness of breath. Patient to receive 1-2 units blood transfusion, recheck H&H   2) Flash pulmonary edema [facility]): Patient became severely short of breath after receiving blood transfusion. Patient admitted to ICU. We will closely monitor and assess patient's respiratory status and fluid status. We will recheck CBC CMP daily. 3) Acute on  CKD: Creatinine 1.8, creatinine 1.37 on July 24, 2020  We will recheck renal function posttransfusion and monitor patient fluid status. 4) Uncontrolled type 2 diabetes mellitus with hyperglycemia ([facility]): Blood glucose 350 in ED. We will check blood glucose every 4 hours and cover with moderate sliding scale insulin. Plan of care discussed with: patient    SIGNATURE: Ruthie Guevara RN, NP  DATE: August 25, 2020  TIME: 5:23 AM     POLO Hernandez MD - supervising physician

## 2020-08-25 NOTE — ED TRIAGE NOTES
Per Pt and family recent had UTI and has several Dr appointment scheduled this week. Today increased right flank pain with no pain or blood with urination. This am pt states feels weak when ambulating. Pt had 1 breathing treatment this am with some relief. Pt to bed with assistance. Pt following Commands, moving all extremities, and A & Ox4.

## 2020-08-25 NOTE — CONSULTS
Subjective:     Patient ID: Katherin Main is a 67 y.o. female who presents today for confusion. HPI 74-year-old right-handed female admitted to hospital with confusion. Patient reports that she was at home and felt fatigued like she has lost blood. She was brought in the hospital in which she was being given blood and she had a reaction. At home she had difficulty with speaking and felt that her body would not do what she wanted to do she felt just tired just walking from her bedroom to the kitchen and back was tiring for her this going on for 2 days. When I am seeing her she is completely asymptomatic She just feels weak. She denies any confusion and she is very articulate about her history. Denies any headache dizziness denies any numbness or tingling she has no history of dementia. She does have some minor back pain which has been a chronic problem for her she has a history of anxiety depression fibromyalgia in the past    Review of Systems   Constitutional: Negative for fever. HENT: Negative for ear pain, tinnitus and trouble swallowing. Eyes: Negative for photophobia and visual disturbance. Respiratory: Negative for choking and shortness of breath. Cardiovascular: Negative for chest pain and palpitations. Gastrointestinal: Negative for nausea and vomiting. Musculoskeletal: Negative for back pain, gait problem, joint swelling, myalgias, neck pain and neck stiffness. Skin: Negative for color change. Allergic/Immunologic: Negative for food allergies. Neurological: Negative for dizziness, tremors, seizures, syncope, facial asymmetry, speech difficulty, weakness, light-headedness, numbness and headaches. Psychiatric/Behavioral: Negative for behavioral problems, confusion, hallucinations and sleep disturbance.        Past Medical History:   Diagnosis Date    Anxiety     Asthma     dx 2019 / has smoked since age 11    CHF (congestive heart failure) (HCC)     Chronic back pain Occupational History    Occupation: Retired-   Social Needs    Financial resource strain: Not on file    Food insecurity     Worry: Not on file     Inability: Not on file   [employer] needs     Medical: Not on file     Non-medical: Not on file   Tobacco Use    Smoking status: Former Smoker     Packs/day: 1.00     Years: 59.00     Pack years: 59.00     Types: Cigarettes     Start date: 2017    Smokeless tobacco: Never Used    Tobacco comment: quit 2-3 weeks ago    Substance and Sexual Activity    Alcohol use: Not Currently    Drug use: No    Sexual activity: Yes   Lifestyle    Physical activity     Days per week: Not on file     Minutes per session: Not on file    Stress: Not on file   Relationships    Social connections     Talks on phone: Not on file     Gets together: Not on file     Attends Moravian service: Not on file     Active member of club or organization: Not on file     Attends meetings of clubs or organizations: Not on file     Relationship status: Not on file    Intimate partner violence     Fear of current or ex partner: Not on file     Emotionally abused: Not on file     Physically abused: Not on file     Forced sexual activity: Not on file   Other Topics Concern    Not on file   Social History Narrative    Not on file     Family History   Problem Relation Age of Onset    Heart Disease Father         cardiac bypass    Arthritis Father     Arthritis Mother     Other Mother          at age 80    Other Sister         [facility]    No Known Problems Daughter     Stroke Son      Allergies   Allergen Reactions    Ibuprofen Nausea Only    Metformin And Related      Diarrhea      Darvon [Propoxyphene Hcl] Nausea And Vomiting     Current Facility-Administered Medications   Medication Dose Route Frequency Provider Last Rate Last Dose    0.9 % sodium chloride bolus  20 mL Intravenous Once Natali Calloway PA-C        methylPREDNISolone 1029    verapamil (CALAN SR) extended release tablet 120 mg  120 mg Oral Nightly Дмитрий Blankenship MD        rosuvastatin (CRESTOR) tablet 40 mg  40 mg Oral QPM Дмитрий Blankenship MD        insulin lispro protamine & lispro (HUMALOG MIX) (75-25) 100 UNIT per ML injection vial SUSP 30 Units  30 Units Subcutaneous BID WC Дмитрий Blankenship MD        hydrALAZINE (APRESOLINE) tablet 100 mg  100 mg Oral BID Дмитрий Blankenship MD   100 mg at 08/25/20 1029    0.9 % sodium chloride bolus  20 mL Intravenous Once DIANE Reynolds-C            Objective:   /84   Pulse 105   Temp 98.1 °F (36.7 °C) (Oral)   Resp 30   Ht 4' 11\" (1.499 m)   Wt 130 lb (59 kg)   LMP  (LMP Unknown)   SpO2 100%   BMI 26.26 kg/m²     Physical Exam  Vitals signs reviewed. Eyes:      Pupils: Pupils are equal, round, and reactive to light. Neck:      Musculoskeletal: Normal range of motion. Cardiovascular:      Rate and Rhythm: Normal rate and regular rhythm. Heart sounds: No murmur. Pulmonary:      Effort: Pulmonary effort is normal.      Breath sounds: Normal breath sounds. Abdominal:      General: Bowel sounds are normal.   Musculoskeletal: Normal range of motion. Skin:     General: Skin is warm. Neurological:      Mental Status: She is alert and oriented to person, place, and time. Cranial Nerves: No cranial nerve deficit. Sensory: No sensory deficit. Motor: No abnormal muscle tone. Coordination: Coordination normal.      Deep Tendon Reflexes: Reflexes are normal and symmetric. Babinski sign absent on the right side. Babinski sign absent on the left side. Psychiatric:         Mood and Affect: Mood normal.         Xr Chest (2 Vw)    Result Date: 8/25/2020  EXAMINATION: XR CHEST (2 VW)  CLINICAL HISTORY:  sob COMPARISONS: None  FINDINGS: Two views of the chest are submitted. The cardiac silhouette is enlarged Pulmonary vascular congestion with increased interstitial markings. Right sided trachea.  No focal infiltrates. No effusions. No Pneumothoraces. PULMONARY VASCULAR CONGESTION WITH INCREASED INTERSTITIAL MARKINGS. RADIOGRAPHIC FINDINGS COULD SUGGEST EARLY CHF. CHF. CORRELATE CLINICALLY    Xr Chest Portable    Result Date: 8/25/2020  EXAMINATION: XR CHEST PORTABLE CLINICAL HISTORY: SHORTNESS OF BREATH COMPARISONS: AUGUST 24, 2020, JUNE 29, 2020 FINDINGS: Osseous structures intact. Cardiopericardial silhouette upper limits of normal. Pulmonary vasculature indistinct. Mild blunting left costophrenic angle. BORDERLINE CARDIOMEGALY WITH INTERSTITIAL EDEMA.        Lab Results   Component Value Date    WBC 11.5 08/25/2020    RBC 3.67 08/25/2020    HGB 7.6 08/25/2020    HCT 26.6 08/25/2020    MCV 72.4 08/25/2020    MCH 20.7 08/25/2020    MCHC 28.6 08/25/2020    RDW 23.5 08/25/2020     08/25/2020    MPV 8.8 08/01/2015     Lab Results   Component Value Date     08/25/2020    K 4.7 08/25/2020    K 4.6 07/03/2020     08/25/2020    CO2 18 08/25/2020    BUN 30 08/25/2020    CREATININE 1.46 08/25/2020    GFRAA 42.1 08/25/2020    LABGLOM 34.8 08/25/2020    GLUCOSE 355 08/25/2020    PROT 7.0 08/25/2020    LABALBU 4.1 08/25/2020    CALCIUM 9.5 08/25/2020    BILITOT 0.3 08/25/2020    ALKPHOS 111 08/25/2020    AST 38 08/25/2020    ALT 20 08/25/2020     Lab Results   Component Value Date    PROTIME 13.2 08/25/2020    INR 1.0 08/25/2020     Lab Results   Component Value Date    TSH 1.200 10/13/2019    GCSFAUGV55 1555 01/17/2014    FERRITIN 27.0 07/03/2020    IRON 22 07/03/2020    TIBC 347 07/03/2020     Lab Results   Component Value Date    TRIG 67 02/24/2020    HDL 42 02/24/2020    LDLCALC 50 02/24/2020     Lab Results   Component Value Date    LABAMPH Neg 07/07/2019    BARBSCNU Neg 07/07/2019    LABBENZ Neg 07/07/2019    OPIATESCREENURINE Neg 07/07/2019    PHENCYCLIDINESCREENURINE Neg 07/07/2019    ETOH <10 07/07/2019 No results found for: LITHIUM, DILFRTOT, VALPROATE    Assessment:   Encephalopathy related to underlying fatigue and shortness of breath. Patient had some speech issues which are very difficult to certain if this were a aphasia or not. Complete vascular profile will be obtained including a carotid ultrasound. Patient has anemia and is not on antiplatelet agents. She has had EGD as well as colonoscopy. At this time I have not recommended any antiplatelet agents unless we have a source of her bleeding. We will arrange for a carotid ultrasound and see if she has any other risk factors for cerebrovascular disease. Patient is noted examination at this time appears to be normal.    Mehul Costa MD, Rani Nunez, American Board of Psychiatry & Neurology  Board Certified in Vascular Neurology  Board Certified in Neuromuscular Medicine  Certified in [facility]:

## 2020-08-25 NOTE — ED PROVIDER NOTES
[address] [facility] ED  eMERGENCY dEPARTMENT eNCOUnter      Pt Name: Viviana Cota  MRN: [id number]  [place] 1945  Date of evaluation: 8/24/2020  Provider: Luzma Gunderson Dr       Chief Complaint   Patient presents with    Flank Pain     right         HISTORY OF PRESENT ILLNESS   (Location/Symptom, Timing/Onset,Context/Setting, Quality, Duration, Modifying Factors, Severity)  Note limiting factors. Viviana Cota is a 67 y.o. female who presents to the emergency department presents with bilateral flank pain family notes she has been fatigued and has lost coordination which is improved prior to arrival family states is been going on for months they have been checking for where she is bleeding she has been losing blood. Patient family note that the looking at her kidney she sees Dr. Dimitri Weston as well as Dr. Kali Blanco for diabetes. An ambulatory in emergency room. Symptoms mild to moderate severity nothing improves or worsens her symptoms. HPI    NursingNotes were reviewed. REVIEW OF SYSTEMS    (2-9 systems for level 4, 10 or more for level 5)     Review of Systems   Constitutional: Positive for fatigue. Negative for activity change, appetite change and unexpected weight change. HENT: Negative for ear discharge, nosebleeds and voice change. Eyes: Negative for photophobia and discharge. Respiratory: Positive for shortness of breath. Negative for apnea. Cardiovascular: Negative for chest pain. Gastrointestinal: Negative for abdominal distention, anal bleeding and vomiting. Endocrine: Negative for cold intolerance, heat intolerance and polyphagia. Genitourinary: Positive for flank pain. Negative for genital sores. Musculoskeletal: Positive for back pain. Negative for joint swelling, neck pain and neck stiffness. Skin: Negative for pallor. Allergic/Immunologic: Negative for immunocompromised state. Neurological: Positive for weakness.  Negative for SOCIAL HISTORY       Social History     Socioeconomic History    Marital status:      Spouse name: None    Number of children: None    Years of education: None    Highest education level: None   Occupational History    Occupation: Retired-   Social Needs    Financial resource strain: None    Food insecurity     Worry: None     Inability: None    Transportation needs     Medical: None     Non-medical: None   Tobacco Use    Smoking status: Former Smoker     Packs/day: 1.00     Years: 59.00     Pack years: 59.00     Types: Cigarettes     Start date: 11/30/2017    Smokeless tobacco: Never Used    Tobacco comment: quit 2-3 weeks ago    Substance and Sexual Activity    Alcohol use: Not Currently    Drug use: No    Sexual activity: Yes   Lifestyle    Physical activity     Days per week: None     Minutes per session: None    Stress: None   Relationships    Social connections     Talks on phone: None     Gets together: None     Attends Evangelical service: None     Active member of club or organization: None     Attends meetings of clubs or organizations: None     Relationship status: None    Intimate partner violence     Fear of current or ex partner: None     Emotionally abused: None     Physically abused: None     Forced sexual activity: None   Other Topics Concern    None   Social History Narrative    None       SCREENINGS      @FLOW([id number])@      PHYSICAL EXAM    (up to 7 for level 4, 8 or more for level 5)     ED Triage Vitals [08/24/20 2006]   BP Temp Temp Source Pulse Resp SpO2 Height Weight   (!) 142/64 99.4 °F (37.4 °C) Oral 78 20 99 % 4' 11\" (1.499 m) 130 lb (59 kg)       Physical Exam  Vitals signs and nursing note reviewed. Constitutional:       General: She is not in acute distress. Appearance: She is well-developed. She is not ill-appearing. HENT:      Head: Normocephalic and atraumatic.       Right Ear: External ear normal.      Left Ear: External ear normal.      Nose: Nose normal.      Mouth/Throat:      Mouth: Mucous membranes are moist.      Pharynx: No oropharyngeal exudate. Eyes:      General:         Right eye: No discharge. Left eye: No discharge. Extraocular Movements: Extraocular movements intact. Pupils: Pupils are equal, round, and reactive to light. Neck:      Musculoskeletal: Normal range of motion and neck supple. Cardiovascular:      Rate and Rhythm: Normal rate and regular rhythm. Pulses: Normal pulses. Heart sounds: Normal heart sounds. Pulmonary:      Effort: Pulmonary effort is normal. No respiratory distress. Breath sounds: Normal breath sounds. No stridor. No wheezing or rhonchi. Abdominal:      General: Bowel sounds are normal. There is no distension. Palpations: Abdomen is soft. Tenderness: There is no abdominal tenderness. Musculoskeletal: Normal range of motion. Right lower leg: No edema. Left lower leg: No edema. Skin:     General: Skin is warm. Findings: No erythema. Neurological:      Mental Status: She is alert and oriented to person, place, and time. Cranial Nerves: No cranial nerve deficit. Motor: No weakness. Coordination: Coordination normal.   Psychiatric:         Mood and Affect: Mood normal.         DIAGNOSTIC RESULTS     EKG: All EKG's are interpreted by the Emergency Department Physician who either signs or Co-signsthis chart in the absence of a cardiologist.    Sinus rate 68.   DC interval 132 ms QRS 70 ms  ms    RADIOLOGY:   Non-plain filmimages such as CT, Ultrasound and MRI are read by the radiologist. Plain radiographic images are visualized and preliminarily interpreted by the emergency physician with the below findings:    See rad reports    Interpretation per the Radiologist below, if available at the time ofthis note:    CT Head WO Contrast    (Results Pending)   XR CHEST (2 VW)    (Results Pending)   CT ABDOMEN PELVIS WO CONTRAST Additional Contrast? None    (Results Pending)         ED BEDSIDE ULTRASOUND:   Performed by ED Physician - none    LABS:  Labs Reviewed   URINE RT REFLEX TO CULTURE - Abnormal; Notable for the following components:       Result Value    Glucose, Ur >=1000 (*)     Leukocyte Esterase, Urine TRACE (*)     All other components within normal limits   COMPREHENSIVE METABOLIC PANEL - Abnormal; Notable for the following components:    Glucose 350 (*)     BUN 27 (*)     CREATININE 1.89 (*)     GFR Non- 25.9 (*)     GFR  31.3 (*)     Alkaline Phosphatase 135 (*)     Globulin 2.1 (*)     All other components within normal limits   CBC WITH AUTO DIFFERENTIAL - Abnormal; Notable for the following components:    RBC 3.43 (*)     Hemoglobin 7.2 (*)     Hematocrit 23.8 (*)     MCV 69.4 (*)     MCH 21.0 (*)     MCHC 30.2 (*)     RDW 23.7 (*)     Platelets 224 (*)     All other components within normal limits   TROPONIN - Abnormal; Notable for the following components:    Troponin 0.034 (*)     All other components within normal limits    Narrative:     CALL  [facility] tel. [phone],  TROP results called to and read back by OKSANA CONTRERAS, 08/24/2020 21:57,  by [facility]   POCT CREATININE - URINE - Normal   MICROSCOPIC URINALYSIS   MAGNESIUM   TYPE AND SCREEN   PREPARE RBC (CROSSMATCH)   TYPE AND SCREEN   PREPARE RBC (CROSSMATCH)       All other labs were within normal range or not returned as of this dictation.     EMERGENCY DEPARTMENT COURSE and DIFFERENTIAL DIAGNOSIS/MDM:   Vitals:    Vitals:    08/24/20 2006 08/24/20 2300   BP: (!) 142/64 (!) 133/107   Pulse: 78 70   Resp: 20 16   Temp: 99.4 °F (37.4 °C)    TempSrc: Oral    SpO2: 99% 98%   Weight: 130 lb (59 kg)    Height: 4' 11\" (1.499 m)             MDM  Number of Diagnoses or Management Options  Chronic kidney disease, unspecified CKD stage:   Flank pain:   Shortness of breath:   Symptomatic anemia:   Diagnosis management comments: DENNIS  08/25/20 0110

## 2020-08-25 NOTE — CARE COORDINATION
[facility] [place] Case Management Initial Discharge Assessment    Met with Patient to discuss discharge plan. PCP: Anai Baum MD                                Date of Last Visit: Current     If no PCP, list provided? N/A    Discharge Planning    Living Arrangements: independently at home    Who do you live with? Spouse    Who helps you with your care:  self or spouse    If lives at home:     Do you have any barriers navigating in your home? no    Patient can perform ADL? Yes    Current Services (outpatient and in home) :  None  Has had [facility] last time she left hospital and her insurance sends nurse to check every few months  Dialysis: No    Is transportation available to get to your appointments? Yes Spouse and dtr drive her     DME Equipment:  yes - Walker,cane,Shower chair,handle bars    Respiratory equipment: Nebulizer    Respiratory provider:  no     Pharmacy:  yes - [facility] in [address] [place] [address] with Medication Assistance Program?  No      Patient agreeable to [place] [id number]? Declined    Patient agreeable to SNF/Rehab? Declined    Other discharge needs identified? N/A    Freedom of choice list provided with basic dialogue that supports the patient's individualized plan of care/goals and shares the quality data associated with the providers. yes    Does Patient Have a High-Risk for Readmission Diagnosis (CHF, PN, MI, COPD)? No        The plan for Transition of Care is related to the following treatment goals: Pt will be HD stable    Initial Discharge Plan? (Note: please see concurrent daily documentation for any updates after initial note). Plans to return home w spouse    The Patient and/or patient representative:  was provided with choice of any post-acute providers for care and equipment and agrees with discharge plan  Yes  Met with patient and her spouse.  She had no services prior to admission and has had [facility] in past. She does not feel she needs any other services at present. She states she needs help to exercise and spouse said he is with her 24/7 and will help her.    Electronically signed by Frances Ansari on 8/25/2020 at 10:58 AM

## 2020-08-25 NOTE — ED NOTES
Pt relates \"am I supposed to feel like I did yesterday morning? \" This RN asks pt what she means by this and pt begins to gasp for air and is unable to talk. This RN stops blood and gets Dr. Alonzo Espinoza while AKASH Chicas stays with patient.       Jaime Bartlett RN  08/25/20 1440 Nutrition Services

## 2020-08-25 NOTE — ED NOTES
Patient to CT via wheelchair at this time.       José [facility]  58/53/82 [id number]

## 2020-08-25 NOTE — ED NOTES
Bed: 15  Expected date:   Expected time:   Means of arrival:   Comments:  66 y/o flank pain/ blood transfusion     Michelle Rahman RN  08/25/20 [id number]

## 2020-08-25 NOTE — PROGRESS NOTES
Pt arrived to [address] [place] [address] is a&ox4, vitals stable, 0/10 pain. No issues with breathing, 100% on room air. No needs at this time. Last blood sugar was 522, ordered to recheck again at 12:44. Call light in reach, bed alarm on.     1330: Since pt has been on the floor, I have found that her short term memory is off. She thinks that she is at home, wanting to pay for her food, and wants to help clean up dishes. She seems to easily be re-oriented.

## 2020-08-25 NOTE — ED NOTES
Pt placed on NRB and 2nd PIV placed with labs drawn and sent to lab. EKG in progress. Pt remains wheezy and unable to talk.       Nikki Baez RN  08/25/20 4270

## 2020-08-25 NOTE — ED PROVIDER NOTES
[address] [facility] ED  eMERGENCY dEPARTMENT eNCOUnter      Pt Name: Epifanio Salvador  MRN: [id number]  [place] 1945  Date of evaluation: 8/24/2020  Provider: Luzma Lawson Dr       Chief Complaint   Patient presents with    Flank Pain     right         HISTORY OF PRESENT ILLNESS   (Location/Symptom, Timing/Onset,Context/Setting, Quality, Duration, Modifying Factors, Severity)  Note limiting factors. Epifanio Salvador is a 67 y.o. female who presents to the emergency department presents with bilateral flank pain family notes she has been fatigued and has lost coordination which is improved prior to arrival family states is been going on for months they have been checking for where she is bleeding she has been losing blood. Patient family note that the looking at her kidney she sees Dr. Amy Cassidy as well as Dr. Nita Ibarra for diabetes. An ambulatory in emergency room. Symptoms mild to moderate severity nothing improves or worsens her symptoms. HPI    NursingNotes were reviewed. REVIEW OF SYSTEMS    (2-9 systems for level 4, 10 or more for level 5)     Review of Systems   Constitutional: Positive for fatigue. Negative for activity change, appetite change and unexpected weight change. HENT: Negative for ear discharge, nosebleeds and voice change. Eyes: Negative for photophobia and discharge. Respiratory: Positive for shortness of breath. Negative for apnea. Cardiovascular: Negative for chest pain. Gastrointestinal: Negative for abdominal distention, anal bleeding and vomiting. Endocrine: Negative for cold intolerance, heat intolerance and polyphagia. Genitourinary: Positive for flank pain. Negative for genital sores. Musculoskeletal: Positive for back pain. Negative for joint swelling, neck pain and neck stiffness. Skin: Negative for pallor. Allergic/Immunologic: Negative for immunocompromised state. Neurological: Positive for weakness.  Negative for seizures and facial asymmetry. Hematological: Does not bruise/bleed easily. Psychiatric/Behavioral: Negative for behavioral problems, self-injury and sleep disturbance. All other systems reviewed and are negative. Except as noted above the remainder of the review of systems was reviewed and negative. PAST MEDICAL HISTORY     Past Medical History:   Diagnosis Date    Anxiety     Asthma     dx 2019 / has smoked since age 14    CHF (congestive heart failure) (HCC)     Chronic back pain     Bilateral L5 S1 Radic on emg--surprisingly worse on the left than the right--pt's symptoms and her MRI show worse on the right    Chronic obstructive pulmonary disease with acute exacerbation ([facility] [address] [facility] 10/12/2019    Depression     Fibromyalgia     Hyperlipidemia     meds > 8 yrs    Hypertension     meds > 45 yrs    On home O2     2l per n/c at bedtime mostly,     Osteoarthritis     Type II diabetes mellitus, uncontrolled ([facility] [address]     hx > 8 yrs    Unspecified sleep apnea          SURGICALHISTORY       Past Surgical History:   Procedure Laterality Date    BACK SURGERY  2017    lumbar disc    CARDIAC CATHETERIZATION  11/3/14     DR. MIRELES / no stents    COLONOSCOPY  08/29/2016    w/polypectomy     DILATION AND CURETTAGE OF UTERUS N/A 10/7/2019    EUA HYSTEROSCOPY DILATATION AND CURETTAGE performed [facility] Calin Florez DO [facility], DIAGNOSTIC      EYE SURGERY      [place] with IOL OU / [address] [place]  [address]    umbilical hernia repair    IN ESOPHAGOGASTRODUODENOSCOPY TRANSORAL DIAGNOSTIC N/A 3/24/2017    EGD ESOPHAGOGASTRODUODENOSCOPY performed by Edie Woodson MD at [facility] N/A 2/8/2018    negative findings    IN REVISE MEDIAN N/CARPAL TUNNEL SURG Left 6/5/2017    LEFT  CARPAL TUNNEL RELEASE performed by Gayla Pollard MD at [facility] N/A 2/8/2018    TONSILLECTOMY      as child    SOCIAL HISTORY       Social History     Socioeconomic History    Marital status:      Spouse name: None    Number of children: None    Years of education: None    Highest education level: None   Occupational History    Occupation: Retired-   Social Needs    Financial resource strain: None    Food insecurity     Worry: None     Inability: None    Transportation needs     Medical: None     Non-medical: None   Tobacco Use    Smoking status: Former Smoker     Packs/day: 1.00     Years: 59.00     Pack years: 59.00     Types: Cigarettes     Start date: 11/30/2017    Smokeless tobacco: Never Used    Tobacco comment: quit 2-3 weeks ago    Substance and Sexual Activity    Alcohol use: Not Currently    Drug use: No    Sexual activity: Yes   Lifestyle    Physical activity     Days per week: None     Minutes per session: None    Stress: None   Relationships    Social connections     Talks on phone: None     Gets together: None     Attends Hoahaoism service: None     Active member of club or organization: None     Attends meetings of clubs or organizations: None     Relationship status: None    Intimate partner violence     Fear of current or ex partner: None     Emotionally abused: None     Physically abused: None     Forced sexual activity: None   Other Topics Concern    None   Social History Narrative    None       SCREENINGS      @FLOW([id number])@      PHYSICAL EXAM    (up to 7 for level 4, 8 or more for level 5)     ED Triage Vitals [08/24/20 2006]   BP Temp Temp Source Pulse Resp SpO2 Height Weight   (!) 142/64 99.4 °F (37.4 °C) Oral 78 20 99 % 4' 11\" (1.499 m) 130 lb (59 kg)       Physical Exam  Vitals signs and nursing note reviewed. Constitutional:       General: She is not in acute distress. Appearance: She is well-developed. She is not ill-appearing. HENT:      Head: Normocephalic and atraumatic.       Right Ear: External ear normal.      Left Ear: External ear normal.      Nose: Nose normal.      Mouth/Throat:      Mouth: Mucous membranes are moist.      Pharynx: No oropharyngeal exudate. Eyes:      General:         Right eye: No discharge. Left eye: No discharge. Extraocular Movements: Extraocular movements intact. Pupils: Pupils are equal, round, and reactive to light. Neck:      Musculoskeletal: Normal range of motion and neck supple. Cardiovascular:      Rate and Rhythm: Normal rate and regular rhythm. Pulses: Normal pulses. Heart sounds: Normal heart sounds. Pulmonary:      Effort: Pulmonary effort is normal. No respiratory distress. Breath sounds: Normal breath sounds. No stridor. No wheezing or rhonchi. Abdominal:      General: Bowel sounds are normal. There is no distension. Palpations: Abdomen is soft. Tenderness: There is no abdominal tenderness. Musculoskeletal: Normal range of motion. Right lower leg: No edema. Left lower leg: No edema. Skin:     General: Skin is warm. Findings: No erythema. Neurological:      Mental Status: She is alert and oriented to person, place, and time. Cranial Nerves: No cranial nerve deficit. Motor: No weakness. Coordination: Coordination normal.   Psychiatric:         Mood and Affect: Mood normal.         DIAGNOSTIC RESULTS     EKG: All EKG's are interpreted by the Emergency Department Physician who either signs or Co-signsthis chart in the absence of a cardiologist.    Sinus rate 68.   WV interval 132 ms QRS 70 ms  ms    RADIOLOGY:   Non-plain filmimages such as CT, Ultrasound and MRI are read by the radiologist. Plain radiographic images are visualized and preliminarily interpreted by the emergency physician with the below findings:    See rad reports    Interpretation per the Radiologist below, if available at the time ofthis note:    CT Head WO Contrast    (Results Pending)   XR CHEST (2 VW)    (Results Pending)   CT ABDOMEN PELVIS WO CONTRAST Additional Contrast? None    (Results Pending)         ED BEDSIDE ULTRASOUND:   Performed by ED Physician - none    LABS:  Labs Reviewed   URINE RT REFLEX TO CULTURE - Abnormal; Notable for the following components:       Result Value    Glucose, Ur >=1000 (*)     Leukocyte Esterase, Urine TRACE (*)     All other components within normal limits   COMPREHENSIVE METABOLIC PANEL - Abnormal; Notable for the following components:    Glucose 350 (*)     BUN 27 (*)     CREATININE 1.89 (*)     GFR Non- 25.9 (*)     GFR  31.3 (*)     Alkaline Phosphatase 135 (*)     Globulin 2.1 (*)     All other components within normal limits   CBC WITH AUTO DIFFERENTIAL - Abnormal; Notable for the following components:    RBC 3.43 (*)     Hemoglobin 7.2 (*)     Hematocrit 23.8 (*)     MCV 69.4 (*)     MCH 21.0 (*)     MCHC 30.2 (*)     RDW 23.7 (*)     Platelets 851 (*)     All other components within normal limits   TROPONIN - Abnormal; Notable for the following components:    Troponin 0.034 (*)     All other components within normal limits    Narrative:     CALL  [facility]  [facility] tel. [id number],  TROP results called to and read back by OKSANA CONTRERAS, 08/24/2020 21:57,  by [facility]   POCT CREATININE - URINE - Normal   MICROSCOPIC URINALYSIS   MAGNESIUM   TYPE AND SCREEN   PREPARE RBC (CROSSMATCH)   TYPE AND SCREEN   PREPARE RBC (CROSSMATCH)       All other labs were within normal range or not returned as of this dictation.     EMERGENCY DEPARTMENT COURSE and DIFFERENTIAL DIAGNOSIS/MDM:   Vitals:    Vitals:    08/24/20 2006 08/24/20 2300   BP: (!) 142/64 (!) 133/107   Pulse: 78 70   Resp: 20 16   Temp: 99.4 °F (37.4 °C)    TempSrc: Oral    SpO2: 99% 98%   Weight: 130 lb (59 kg)    Height: 4' 11\" (1.499 m)             MDM  Number of Diagnoses or Management Options  Chronic kidney disease, unspecified CKD stage:   Flank pain:   Shortness of breath:   Symptomatic anemia:   Diagnosis management comments: Patient ambulatory emergency room wants disposition to home Dr. Lora Lloyd evaluates patient recommends admission. Is worsening anemia fatigue and was short of breath today. Dr. Lora Lloyd states patient is willing to stay has signed consent form for blood products patient agreeable to stay Dr. Kaci Florez requests rectal exam to assess for bleeding patient states that she has no bleeding she has been evaluated for bleeding including GI evaluation as he refuses rectal exam still agreeable for admission of blood products. Discussed with Dr. Domo Bush. Will admit. As patient refused rectal exam and only wants treatment and disposition to home Dr. Domo Bush refuses admission states she can be transfused in emergency room discharge home discussed with Dr. Lora Lloyd he request a second unit of blood ordered and will keep the patient as an ED observation. Amount and/or Complexity of Data Reviewed  Clinical lab tests: reviewed and ordered  Tests in the radiology section of CPT®: reviewed and ordered  Discuss the patient with other providers: yes        CRITICAL CARE TIME       CONSULTS:  None    PROCEDURES:  Unless otherwise noted below, none     Procedures    FINAL IMPRESSION      1. Symptomatic anemia    2. Shortness of breath    3. Chronic kidney disease, unspecified CKD stage    4.  Flank pain          DISPOSITION/PLAN   DISPOSITION        PATIENT REFERRED TO:  Ashley Cook MD  [address] [place] [address] [facility],[address]          Ashley Cook [place]  [address] [address] [place] [phone]            DISCHARGE MEDICATIONS:  New Prescriptions    No medications on file          (Please note that portions of this note were completed with a voice recognition program.  Efforts were made to edit the dictations but occasionally words are mis-transcribed.)    Vito Hardin PA-C (electronically signed)  Attending Emergency Physician       Vito Hardin PA-C  08/24/20 [facility]  08/25/20 0110

## 2020-08-25 NOTE — ED PROVIDER NOTES
[address] [facility] ED  eMERGENCYdEPARTMENT eNCOUnter      Pt Name: Valley Phoenix  MRN: [id number]  [place] 1945  Date of evaluation: 8/24/2020  Provider:SHAVONNE ATWOOD MD    CHIEF COMPLAINT           HPI  Valley Phoenix is a 67 y.o. female per chart review has a h/o    Asked to see this patient for possible reaction to blood transfusion. Patient had received 25 ml of prbc, and noted onset of sob, with difficulty breathing laying flat and noted wheezing and mild respiratory distress. Denied itching. Noted some chest pain. Noted hypertension in the 375 systolic range. ROS  Review of Systems  Unless otherwise stated in this report or unable to obtain because of the patient's clinical or mental status as evidenced by the medical record, the patient's positive and negative responses for review of systems for the constitutional, eyes, ENT,  cardiovascular, respiratory, gastrointestinal, neurological, genitourinary, musculoskeletal, and integument systems and related systems to the presenting problem are either stated in the HPI or were not pertinent or were negative for the symptoms and/or  complaints related to the present medical problems. Except as noted above the remainder of the review of systems was reviewed and negative.        PAST MEDICAL HISTORY     Past Medical History:   Diagnosis Date    Anxiety     Asthma     dx 2019 / has smoked since age 14    CHF (congestive heart failure) (HCC)     Chronic back pain     Bilateral L5 S1 Radic on emg--surprisingly worse on the left than the right--pt's symptoms and her MRI show worse on the right    Chronic obstructive pulmonary disease with acute exacerbation ([facility] [address] 10/12/2019    Depression     Fibromyalgia     Hyperlipidemia     meds > 8 yrs    Hypertension     meds > 45 yrs    On home O2     2l per n/c at bedtime mostly,     Osteoarthritis     Type II diabetes mellitus, uncontrolled (HCC)     hx > 8 yrs    Unspecified sleep apnea SURGICAL HISTORY       Past Surgical History:   Procedure Laterality Date    BACK SURGERY  2017    lumbar disc    CARDIAC CATHETERIZATION  11/3/14     DR. MIRELES / no stents    COLONOSCOPY  08/29/2016    w/polypectomy     DILATION AND CURETTAGE OF UTERUS N/A 10/7/2019    EUA HYSTEROSCOPY DILATATION AND CURETTAGE performed by Algis Homans, DO at [facility]. [address], COLON, DIAGNOSTIC      EYE SURGERY      [place] with IOL OU / [address] [place]  [address]    umbilical hernia repair    IN ESOPHAGOGASTRODUODENOSCOPY TRANSORAL DIAGNOSTIC N/A 3/24/2017    EGD ESOPHAGOGASTRODUODENOSCOPY performed by Michelle Foreman MD at [facility] N/A 2/8/2018    negative findings    IN REVISE MEDIAN N/CARPAL TUNNEL SURG Left 6/5/2017    LEFT  CARPAL TUNNEL RELEASE performed by Malinda Quintero MD at [facility],5+U/U,LTDEM N/A 2/8/2018    TONSILLECTOMY      as child    UPPER GASTROINTESTINAL ENDOSCOPY  08/26/2016    w/bx     UPPER GASTROINTESTINAL ENDOSCOPY N/A 2/25/2020    EGD possible biopsy performed by Raj Delgadillo MD at [address] [place] [address] 7/1/2020    EGD PUSH ENTEROSCOPY performed by Sylvia Floyd MD at [address]       Previous Medications    ACCU-CHEK PHYLLIS PLUS STRIP    Test 3x daily Dx E11.65    CARVEDILOL (COREG) 25 MG TABLET    Take 2 tablets by mouth 2 times daily    DICYCLOMINE (BENTYL) 10 MG CAPSULE    Take 1 capsule by mouth 4 times daily as needed (abdominal pain)    DIGOXIN (LANOXIN) 125 MCG TABLET    Take 1 tablet by mouth daily    FUROSEMIDE (LASIX) 40 MG TABLET    Take 1 tablet by mouth daily    HYDRALAZINE (APRESOLINE) 100 MG TABLET    Take 100 mg by mouth 2 times daily    INSULIN 70-30 (NOVOLIN 70/30) (70-30) 100 UNIT PER ML INJECTION VIAL    30-40  units twice a day if glucose more than 120    NICOTINE (NICODERM CQ) 14 MG/24HR    Place 1 patch onto the skin daily    ONDANSETRON (ZOFRAN) 4 MG TABLET    Take 1 tablet by mouth every 8 hours as needed for Nausea or Vomiting    PANTOPRAZOLE (PROTONIX) 40 MG TABLET    Take 40mg twice daily (1st dose 30min before breakfast) for 30 days.  After 30 days, you make take 40mg once daily before breakfast.    PAROXETINE (PAXIL) 40 MG TABLET    TAKE 1 TABLET BY MOUTH ONCE DAILY IN THE MORNING    ROSUVASTATIN (CRESTOR) 40 MG TABLET    Take 1 tablet by mouth every evening    VERAPAMIL (CALAN SR) 120 MG EXTENDED RELEASE TABLET    Take 1 tablet by mouth nightly       ALLERGIES     Ibuprofen; Metformin and related; and Darvon [propoxyphene hcl]    FAMILY HISTORY       Family History   Problem Relation Age of Onset    Heart Disease Father         cardiac bypass    Arthritis Father     Arthritis Mother     Other Mother          at age 80    Other Sister         [facility]    No Known Problems Daughter     Stroke Son           SOCIAL HISTORY       Social History     Socioeconomic History    Marital status:      Spouse name: None    Number of children: None    Years of education: None    Highest education level: None   Occupational History    Occupation: Retired-   Social Needs    Financial resource strain: None    Food insecurity     Worry: None     Inability: None    Transportation needs     Medical: None     Non-medical: None   Tobacco Use    Smoking status: Former Smoker     Packs/day: 1.00     Years: 59.00     Pack years: 59.00     Types: Cigarettes     Start date: 2017    Smokeless tobacco: Never Used    Tobacco comment: quit 2-3 weeks ago    Substance and Sexual Activity    Alcohol use: Not Currently    Drug use: No    Sexual activity: Yes   Lifestyle    Physical activity     Days per week: None     Minutes per session: None    Stress: None   Relationships    Social connections     Talks on phone: None     Gets together: None     Attends Buddhist service: None     Active member of club or organization: None     Attends meetings of clubs or organizations: None     Relationship status: None    Intimate partner violence     Fear of current or ex partner: None     Emotionally abused: None     Physically abused: None     Forced sexual activity: None   Other Topics Concern    None   Social History Narrative    None         PHYSICAL EXAM       ED Triage Vitals [08/24/20 2006]   BP Temp Temp Source Pulse Resp SpO2 Height Weight   (!) 142/64 99.4 °F (37.4 °C) Oral 78 20 99 % 4' 11\" (1.499 m) 130 lb (59 kg)       Physical Exam    Vitals signs and nursing note reviewed. Constitutional:       Appearance: Normal appearance. He is well-developed and normal weight. HENT:      Head: Normocephalic and atraumatic. Right Ear: Tympanic membrane, ear canal and external ear normal.      Left Ear: Tympanic membrane, ear canal and external ear normal.      Nose: Nose normal.      Mouth/Throat:      Mouth: Mucous membranes are moist.      Pharynx: Oropharynx is clear. Eyes:      Extraocular Movements: Extraocular movements intact. Conjunctiva/sclera: Conjunctivae normal.      Pupils: Pupils are equal, round, and reactive to light. Neck:      Musculoskeletal: Normal range of motion and neck supple. Cardiovascular:      Rate and Rhythm: Normal rate and regular rhythm. Pulses: Normal pulses. Heart sounds: Normal heart sounds. Pulmonary:      Effort: Pulmonary effort is tachypneic     Breath sounds: Bilateral wheezing with some rales at the bases  Abdominal:      General: Abdomen is flat. Bowel sounds are normal.      Palpations: Abdomen is soft. Musculoskeletal: Normal range of motion. Skin:     General: Skin is warm and dry. Capillary Refill: Capillary refill takes less than 2 seconds. Neurological:      General: No focal deficit present. Mental Status: He is alert and oriented for age.    Psychiatric:         Mood and Affect: Mood normal.         Behavior: Behavior normal.         Thought Content: Thought content normal.         Judgment: Judgment normal.         MDM:  blood transfusion stopped  DuoNeb aerosol. Solu-Medrol 125 mg IV push. Benadryl 50 mg IV push. Lasix 80 mg IV push. Nitro-Bid ointment 1 inch to chest.  Morphine 6 mg IV push. Labetalol 10 mg IV push. EKG was done. Labs ordered. ABGs done Patient improved with treatment regimen. Case discussed with hospitalist and patient was seen in the ER as well by the hospitalist t.  CT scan of the head and chest ordered. Patient is admitted at this time. EKG shows NSR with HR 81, normal axis, normal intervals, non specific STT changes. T wave inversion in V4 V5 V6 and also 1 and aVL. CT scan of the head was repeated and showed chronic small vessel ischemic changes which are similar to the changes seen on CT scan that was done at 2115. Patient also had bilateral hypodensities which are also seen on the CT scan that was done at 2115. CT scan of the chest was done showed fluffy infiltrates consistent with possible congestive heart failure. FINAL IMPRESSION      1. Symptomatic anemia    2. Shortness of breath    3. Chronic kidney disease, unspecified CKD stage    4.  Flank pain          DISPOSITION/PLAN   DISPOSITION Admitted 08/25/2020 03:07:56 AM        DISCHARGE MEDICATIONS:  Ashutosh Harris MD(electronically signed)  Attending Emergency Physician            Jailyn Bailey MD  08/25/20 [id number]

## 2020-08-26 LAB
ALBUMIN SERPL-MCNC: 3.7 G/DL (ref 3.5–4.6)
ALP BLD-CCNC: 93 U/L (ref 40–130)
ALT SERPL-CCNC: 17 U/L (ref 0–33)
AMMONIA: 58 UMOL/L (ref 11–51)
ANION GAP SERPL CALCULATED.3IONS-SCNC: 17 MEQ/L (ref 9–15)
AST SERPL-CCNC: 23 U/L (ref 0–35)
BASOPHILS ABSOLUTE: 0 K/UL (ref 0–0.2)
BASOPHILS RELATIVE PERCENT: 0.1 %
BILIRUB SERPL-MCNC: 0.3 MG/DL (ref 0.2–0.7)
BLOOD BANK DISPENSE STATUS: NORMAL
BLOOD BANK PRODUCT CODE: NORMAL
BPU ID: NORMAL
BUN BLDV-MCNC: 54 MG/DL (ref 8–23)
C-REACTIVE PROTEIN, HIGH SENSITIVITY: 3.8 MG/L (ref 0–5)
CALCIUM SERPL-MCNC: 9.3 MG/DL (ref 8.5–9.9)
CHLORIDE BLD-SCNC: 104 MEQ/L (ref 95–107)
CO2: 17 MEQ/L (ref 20–31)
CREAT SERPL-MCNC: 1.83 MG/DL (ref 0.5–0.9)
DESCRIPTION BLOOD BANK: NORMAL
EOSINOPHILS ABSOLUTE: 0 K/UL (ref 0–0.7)
EOSINOPHILS RELATIVE PERCENT: 0 %
GFR AFRICAN AMERICAN: 32.5
GFR NON-AFRICAN AMERICAN: 26.8
GLOBULIN: 2.4 G/DL (ref 2.3–3.5)
GLUCOSE BLD-MCNC: 238 MG/DL (ref 60–115)
GLUCOSE BLD-MCNC: 297 MG/DL (ref 60–115)
GLUCOSE BLD-MCNC: 325 MG/DL (ref 70–99)
GLUCOSE BLD-MCNC: 407 MG/DL (ref 60–115)
GLUCOSE BLD-MCNC: 411 MG/DL (ref 60–115)
GLUCOSE BLD-MCNC: 436 MG/DL (ref 60–115)
HAPTOGLOBIN: 300 MG/DL (ref 30–200)
HCT VFR BLD CALC: 22.1 % (ref 37–47)
HCT VFR BLD CALC: 26.1 % (ref 37–47)
HCT VFR BLD CALC: 28 % (ref 37–47)
HCT VFR BLD CALC: 29.8 % (ref 37–47)
HEMOGLOBIN: 6.5 G/DL (ref 12–16)
HEMOGLOBIN: 7.7 G/DL (ref 12–16)
HEMOGLOBIN: 8 G/DL (ref 12–16)
HEMOGLOBIN: 8.2 G/DL (ref 12–16)
LACTATE DEHYDROGENASE: 320 U/L (ref 135–214)
LYMPHOCYTES ABSOLUTE: 0.8 K/UL (ref 1–4.8)
LYMPHOCYTES RELATIVE PERCENT: 6.1 %
MAGNESIUM: 2.5 MG/DL (ref 1.7–2.4)
MCH RBC QN AUTO: 20.6 PG (ref 27–31.3)
MCHC RBC AUTO-ENTMCNC: 29.5 % (ref 33–37)
MCV RBC AUTO: 69.9 FL (ref 82–100)
MONOCYTES ABSOLUTE: 0.4 K/UL (ref 0.2–0.8)
MONOCYTES RELATIVE PERCENT: 3.4 %
NEUTROPHILS ABSOLUTE: 11.6 K/UL (ref 1.4–6.5)
NEUTROPHILS RELATIVE PERCENT: 90.4 %
PDW BLD-RTO: 23.6 % (ref 11.5–14.5)
PERFORMED ON: ABNORMAL
PLATELET # BLD: 454 K/UL (ref 130–400)
POTASSIUM SERPL-SCNC: 4.4 MEQ/L (ref 3.4–4.9)
PROCALCITONIN: 0.07 NG/ML (ref 0–0.15)
RBC # BLD: 3.16 M/UL (ref 4.2–5.4)
REASON FOR REJECTION: NORMAL
REJECTED TEST: NORMAL
SEDIMENTATION RATE, ERYTHROCYTE: 30 MM (ref 0–30)
SODIUM BLD-SCNC: 138 MEQ/L (ref 135–144)
TOTAL PROTEIN: 6.1 G/DL (ref 6.3–8)
WBC # BLD: 12.8 K/UL (ref 4.8–10.8)

## 2020-08-26 PROCEDURE — P9016 RBC LEUKOCYTES REDUCED: HCPCS

## 2020-08-26 PROCEDURE — C9113 INJ PANTOPRAZOLE SODIUM, VIA: HCPCS | Performed by: NURSE PRACTITIONER

## 2020-08-26 PROCEDURE — 99233 SBSQ HOSP IP/OBS HIGH 50: CPT | Performed by: INTERNAL MEDICINE

## 2020-08-26 PROCEDURE — 83735 ASSAY OF MAGNESIUM: CPT

## 2020-08-26 PROCEDURE — 6360000002 HC RX W HCPCS: Performed by: INTERNAL MEDICINE

## 2020-08-26 PROCEDURE — 2580000003 HC RX 258: Performed by: INTERNAL MEDICINE

## 2020-08-26 PROCEDURE — 82140 ASSAY OF AMMONIA: CPT

## 2020-08-26 PROCEDURE — 83615 LACTATE (LD) (LDH) ENZYME: CPT

## 2020-08-26 PROCEDURE — 1210000000 HC MED SURG R&B

## 2020-08-26 PROCEDURE — 85652 RBC SED RATE AUTOMATED: CPT

## 2020-08-26 PROCEDURE — 86141 C-REACTIVE PROTEIN HS: CPT

## 2020-08-26 PROCEDURE — 6370000000 HC RX 637 (ALT 250 FOR IP): Performed by: PSYCHIATRY & NEUROLOGY

## 2020-08-26 PROCEDURE — 6370000000 HC RX 637 (ALT 250 FOR IP): Performed by: INTERNAL MEDICINE

## 2020-08-26 PROCEDURE — 83550 IRON BINDING TEST: CPT

## 2020-08-26 PROCEDURE — 2580000003 HC RX 258: Performed by: NURSE PRACTITIONER

## 2020-08-26 PROCEDURE — 80053 COMPREHEN METABOLIC PANEL: CPT

## 2020-08-26 PROCEDURE — 90792 PSYCH DIAG EVAL W/MED SRVCS: CPT | Performed by: PSYCHIATRY & NEUROLOGY

## 2020-08-26 PROCEDURE — 99233 SBSQ HOSP IP/OBS HIGH 50: CPT | Performed by: PSYCHIATRY & NEUROLOGY

## 2020-08-26 PROCEDURE — 36430 TRANSFUSION BLD/BLD COMPNT: CPT

## 2020-08-26 PROCEDURE — 36415 COLL VENOUS BLD VENIPUNCTURE: CPT

## 2020-08-26 PROCEDURE — 85025 COMPLETE CBC W/AUTO DIFF WBC: CPT

## 2020-08-26 PROCEDURE — 2580000003 HC RX 258: Performed by: PSYCHIATRY & NEUROLOGY

## 2020-08-26 PROCEDURE — 85018 HEMOGLOBIN: CPT

## 2020-08-26 PROCEDURE — 83540 ASSAY OF IRON: CPT

## 2020-08-26 PROCEDURE — 6360000002 HC RX W HCPCS: Performed by: NURSE PRACTITIONER

## 2020-08-26 PROCEDURE — 85014 HEMATOCRIT: CPT

## 2020-08-26 PROCEDURE — 84145 PROCALCITONIN (PCT): CPT

## 2020-08-26 RX ORDER — 0.9 % SODIUM CHLORIDE 0.9 %
20 INTRAVENOUS SOLUTION INTRAVENOUS ONCE
Status: DISCONTINUED | OUTPATIENT
Start: 2020-08-26 | End: 2020-08-28 | Stop reason: HOSPADM

## 2020-08-26 RX ORDER — ZIPRASIDONE MESYLATE 20 MG/ML
20 INJECTION, POWDER, LYOPHILIZED, FOR SOLUTION INTRAMUSCULAR ONCE
Status: DISCONTINUED | OUTPATIENT
Start: 2020-08-26 | End: 2020-08-27

## 2020-08-26 RX ORDER — QUETIAPINE FUMARATE 25 MG/1
25 TABLET, FILM COATED ORAL NIGHTLY
Status: DISCONTINUED | OUTPATIENT
Start: 2020-08-26 | End: 2020-08-28 | Stop reason: HOSPADM

## 2020-08-26 RX ORDER — QUETIAPINE FUMARATE 50 MG/1
100 TABLET, FILM COATED ORAL ONCE
Status: COMPLETED | OUTPATIENT
Start: 2020-08-26 | End: 2020-08-26

## 2020-08-26 RX ADMIN — VERAPAMIL HYDROCHLORIDE 120 MG: 120 TABLET, FILM COATED, EXTENDED RELEASE ORAL at 23:20

## 2020-08-26 RX ADMIN — PANTOPRAZOLE SODIUM 40 MG: 40 INJECTION, POWDER, FOR SOLUTION INTRAVENOUS at 07:49

## 2020-08-26 RX ADMIN — CARVEDILOL 50 MG: 25 TABLET, FILM COATED ORAL at 11:02

## 2020-08-26 RX ADMIN — ROSUVASTATIN CALCIUM 40 MG: 10 TABLET, FILM COATED ORAL at 18:18

## 2020-08-26 RX ADMIN — INSULIN LISPRO 30 UNITS: 100 INJECTION, SUSPENSION SUBCUTANEOUS at 08:59

## 2020-08-26 RX ADMIN — Medication 10 ML: at 07:49

## 2020-08-26 RX ADMIN — Medication 10 ML: at 11:04

## 2020-08-26 RX ADMIN — INSULIN HUMAN 10 UNITS: 100 INJECTION, SOLUTION PARENTERAL at 23:20

## 2020-08-26 RX ADMIN — QUETIAPINE FUMARATE 100 MG: 50 TABLET ORAL at 11:02

## 2020-08-26 RX ADMIN — INSULIN LISPRO 12 UNITS: 100 INJECTION, SOLUTION INTRAVENOUS; SUBCUTANEOUS at 09:00

## 2020-08-26 RX ADMIN — INSULIN LISPRO 6 UNITS: 100 INJECTION, SOLUTION INTRAVENOUS; SUBCUTANEOUS at 12:22

## 2020-08-26 RX ADMIN — PANTOPRAZOLE SODIUM 40 MG: 40 INJECTION, POWDER, FOR SOLUTION INTRAVENOUS at 18:18

## 2020-08-26 RX ADMIN — HYDRALAZINE HYDROCHLORIDE 100 MG: 100 TABLET, FILM COATED ORAL at 22:14

## 2020-08-26 RX ADMIN — Medication 10 ML: at 18:19

## 2020-08-26 RX ADMIN — INSULIN LISPRO 30 UNITS: 100 INJECTION, SUSPENSION SUBCUTANEOUS at 17:23

## 2020-08-26 RX ADMIN — INSULIN LISPRO 4 UNITS: 100 INJECTION, SOLUTION INTRAVENOUS; SUBCUTANEOUS at 17:24

## 2020-08-26 RX ADMIN — QUETIAPINE FUMARATE 25 MG: 25 TABLET ORAL at 22:14

## 2020-08-26 RX ADMIN — HYDRALAZINE HYDROCHLORIDE 100 MG: 100 TABLET, FILM COATED ORAL at 11:02

## 2020-08-26 RX ADMIN — CARVEDILOL 50 MG: 25 TABLET, FILM COATED ORAL at 22:14

## 2020-08-26 RX ADMIN — SODIUM CHLORIDE 200 MG: 9 INJECTION, SOLUTION INTRAVENOUS at 11:11

## 2020-08-26 ASSESSMENT — ENCOUNTER SYMPTOMS
COUGH: 0
GASTROINTESTINAL NEGATIVE: 1
EYES NEGATIVE: 1
STRIDOR: 0
CHEST TIGHTNESS: 0
BLOOD IN STOOL: 0
NAUSEA: 0
WHEEZING: 0
RESPIRATORY NEGATIVE: 1
SHORTNESS OF BREATH: 0

## 2020-08-26 ASSESSMENT — PAIN SCALES - GENERAL: PAINLEVEL_OUTOF10: 0

## 2020-08-26 NOTE — PROGRESS NOTES
Pt is very upset this morning, calling me and the aide \"crazy\" yelling out, \"leave me alone!!!\" She made Stone Prasad (the 1:1) leave the room, slammed the door shut in her face. I attempted to place an avasys in the patient's room, she got out of bed and wheeled it out into the hallway, and slammed the door shut. She is stable and steady walking around in her room this morning.

## 2020-08-26 NOTE — PROGRESS NOTES
Pt. Alert and oriented to self. Pt. frequently being confused on where she was at and the situation. She was a 1:1 for safety and confusion throughout the shift. Pt. Was up ambulating in the hallways with PCA at her side until approximately 10pm.  Attempts made by pt. To leave the unit; however, she was redirected well. Pt. At times became verbally abusive and aggitated when attempts to redirect or distract the pt. were made. Pt. At around 2230 expressed remorse for having mistreated staff. Pt. Remained in her room for the remainder of the night and rested well from 2330 until shift change per sitter. Pt. Denied any c/o pain or nausea. Tolerating current diet well with a snack given prior to bed. Glucose checked at HS and was 225. Humalog given per order. Falls precautions in place with bed alarm on.  1:1 sitter at bedside. Pt. Up with standby assist; unsteady at times. Denies needs at times time.

## 2020-08-26 NOTE — CONSULTS
[facility], Department of Psychiatry  Behavioral Health Consult    REASON FOR CONSULT: AMS    History obtained from:     HISTORY OF PRESENT ILLNESS:    The patient is a 67 y.o. female  Present with AMS  Pt was irritable and angry, agitated  Confused, not coperating with the assessment  Paranoid and suspicous  Unable to elicit information    The patient is not currently receiving care for the above psychiatric illness. Psychiatric Review of Systems       Depression: no     Jessica or Hypomania:  no     Panic Attacks:  no     Phobias:  no     Obsessions and Compulsions:  no     PTSD : no     Hallucinations:       Delusions:  yes       Substance Abuse History:  ETOH: no  Marijuana: no  Opiates: no  Other Drugs: no    Past Medical History:        Diagnosis Date    Anxiety     Asthma     dx 2019 / has smoked since age 11    CHF (congestive heart failure) (HCC)     Chronic back pain     Bilateral L5 S1 Radic on emg--surprisingly worse on the left than the right--pt's symptoms and her MRI show worse on the right    Chronic obstructive pulmonary disease with acute exacerbation ([address] 10/12/2019    Depression     Fibromyalgia     Hyperlipidemia     meds > 8 yrs    Hypertension     meds > 45 yrs    On home O2     2l per n/c at bedtime mostly,     Osteoarthritis     Type II diabetes mellitus, uncontrolled ([address]     hx > 8 yrs    Unspecified sleep apnea        Past Surgical History:        Procedure Laterality Date    BACK SURGERY  2017    lumbar disc    CARDIAC CATHETERIZATION  11/3/14     DR. MIRELES / no stents    COLONOSCOPY  08/29/2016    w/polypectomy     DILATION AND CURETTAGE OF UTERUS N/A 10/7/2019    EUA HYSTEROSCOPY DILATATION AND CURETTAGE performed by Ingrid Figureoa DO at [address] [place] [address] ENDOSCOPY, COLON, DIAGNOSTIC      EYE SURGERY      [place] with IOL OU / [address]    umbilical hernia repair    AR ESOPHAGOGASTRODUODENOSCOPY TRANSORAL DIAGNOSTIC N/A 3/24/2017    EGD ESOPHAGOGASTRODUODENOSCOPY performed by Doc Turner MD at [facility] N/A 2/8/2018    negative findings    [place] REVISE MEDIAN N/CARPAL TUNNEL SURG Left 6/5/2017    LEFT  CARPAL TUNNEL RELEASE performed by Sonia Freeman MD at [facility]N,9+G/Q,ZWJBJ N/A 2/8/2018    TONSILLECTOMY      as child    UPPER GASTROINTESTINAL ENDOSCOPY  08/26/2016    w/bx     UPPER GASTROINTESTINAL ENDOSCOPY N/A 2/25/2020    EGD possible biopsy performed by Jb Chun MD at [address] [place] [address] 7/1/2020    EGD PUSH ENTEROSCOPY performed by Carolina Barakat MD at [facility]       Medications Prior to Admission:   Medications Prior to Admission: rosuvastatin (CRESTOR) 40 MG tablet, Take 1 tablet by mouth every evening  ACCU-CHEK PHYLLIS PLUS strip, Test 3x daily Dx E11.65  insulin 70-30 (NOVOLIN 70/30) (70-30) 100 UNIT per ML injection vial, 30-40  units twice a day if glucose more than 120 (Patient taking differently: Inject 30 Units into the skin 2 times daily (before meals) 30-40  units twice a day if glucose more than 120)  carvedilol (COREG) 25 MG tablet, Take 2 tablets by mouth 2 times daily  verapamil (CALAN SR) 120 MG extended release tablet, Take 1 tablet by mouth nightly  PARoxetine (PAXIL) 40 MG tablet, TAKE 1 TABLET BY MOUTH ONCE DAILY IN THE MORNING (Patient taking differently: Take 40 mg by mouth every morning TAKE 1 TABLET BY MOUTH ONCE DAILY IN THE MORNING)  digoxin (LANOXIN) 125 MCG tablet, Take 1 tablet by mouth daily  hydrALAZINE (APRESOLINE) 100 MG tablet, Take 100 mg by mouth 2 times daily  furosemide (LASIX) 40 MG tablet, Take 1 tablet by mouth daily  [DISCONTINUED] nicotine (NICODERM CQ) 14 MG/24HR, Place 1 patch onto the skin daily  pantoprazole (PROTONIX) 40 MG tablet, Take 40mg twice daily (1st dose 30min before breakfast) for 30 days.  After 30 days, you make take 40mg once daily before breakfast.  dicyclomine (BENTYL) 10 MG capsule, Take 1 capsule by mouth 4 times daily as needed (abdominal pain)  ondansetron (ZOFRAN) 4 MG tablet, Take 1 tablet by mouth every 8 hours as needed for Nausea or Vomiting    Allergies:  Ibuprofen; Metformin and related; and Darvon [propoxyphene hcl]    FAMILY/SOCIAL HISTORY:  Family History   Problem Relation Age of Onset    Heart Disease Father         cardiac bypass    Arthritis Father     Arthritis Mother     Other Mother          at age 80    Other Sister         [facility]    No Known Problems Daughter     Stroke Son      Social History     Socioeconomic History    Marital status:      Spouse name: Not on file    Number of children: Not on file    Years of education: Not on file    Highest education level: Not on file   Occupational History    Occupation: Retired-   Social Needs    Financial resource strain: Not on file    Food insecurity     Worry: Not on file     Inability: Not on file   [employer] needs     Medical: Not on file     Non-medical: Not on file   Tobacco Use    Smoking status: Former Smoker     Packs/day: 1.00     Years: 59.00     Pack years: 59.00     Types: Cigarettes     Start date: 2017    Smokeless tobacco: Never Used    Tobacco comment: quit 2-3 weeks ago    Substance and Sexual Activity    Alcohol use: Not Currently    Drug use: No    Sexual activity: Yes   Lifestyle    Physical activity     Days per week: Not on file     Minutes per session: Not on file    Stress: Not on file   Relationships    Social connections     Talks on phone: Not on file     Gets together: Not on file     Attends Taoism service: Not on file     Active member of club or organization: Not on file     Attends meetings of clubs or organizations: Not on file     Relationship status: Not on file    Intimate partner violence     Fear of current or ex partner: Not on file     Emotionally abused: 08/25/20  5845  08/25/20  1559 08/25/20  2307 08/26/20  0727 08/26/20  1333   WBC 11.5*  --  9.7  --  12.8*  --    HGB 7.6*   < > 7.1* 7.2* 6.5* 7.7*     --  478*  --  454*  --     < > = values in this interval not displayed. Recent Labs     08/25/20  0613 08/25/20  1559 08/26/20  0727    134* 138   K 4.7 4.8 4.4    97 104   CO2 18* 17* 17*   BUN 30* 45* 54*   CREATININE 1.46* 1.94* 1.83*   GLUCOSE 355* 420* 325*     Recent Labs     08/25/20  0613 08/25/20  1559 08/26/20  0727   BILITOT 0.3 0.3 0.3   ALKPHOS 111 118 93   AST 38* 20 23   ALT 20 18 17     Lab Results   Component Value Date    LABAMPH Neg 07/07/2019    BARBSCNU Neg 07/07/2019    LABBENZ Neg 07/07/2019    OPIATESCREENURINE Neg 07/07/2019    PHENCYCLIDINESCREENURINE Neg 07/07/2019    ETOH <10 07/07/2019     Lab Results   Component Value Date    TSH 1.200 10/13/2019     No results found for: LITHIUM  No results found for: VALPROATE, CBMZ  No results found for: LITHIUM, VALPROATE    FURTHER LABS ORDERED :      Radiology   Ct Abdomen Pelvis Wo Contrast Additional Contrast? None    Result Date: 8/25/2020  EXAMINATION:  CT SCAN THE CHEST CLINICAL HISTORY:  Short of breath COMPARISON:  March 24, 2019 TECHNIQUE:  Multiple serial axial images of the chest from the base the neck through the upper abdomen with both sagittal coronal reconstruction was performed without intravenous or oral administration of contrast. FINDINGS:  There is a patchy mosaic appearance lung parenchyma that can be seen with small airways disease. There is bibasilar areas of atelectasis, scarring. No focal infiltrates. No effusions no pneumothoraces. No significant periaortic adenopathy. There is pretracheal adenopathy. There is multilevel degenerative changes with osteophytes of the thoracic spine.      Unremarkable CT scan the chest as described above All CT scans at this facility use dose modulation, iterative reconstruction, and/or weight based dosing when appropriate to reduce radiation dose to as low as reasonably achievable. Examination: CT ABDOMEN PELVIS WO CONTRAST, CT CHEST WO CONTRAST Indication:   flank pain Technique: Multiple serial axial images was performed through the abdomen and pelvis without intravenous or oral administration of contrast..   Images were reconstructed in the axial and coronal and sagittal planes. Comparison: September 9, 2019 Findings: The liver, gallbladder, spleen, pancreas, adrenals,  are unremarkable. The kidneys show no significant perinephric stranding. No nephrolithiasis. No hydronephrosis or hydroureter. No bladder calculi. Large and small bowel show no sign of obstruction. The appendix is not visualized. No pericecal stranding. No diverticulitis. No free air. No free fluid. The visualized abdominal aorta is of normal size and caliber. No significant retroperitoneal adenopathy. There is multilevel degenerative changes of lumbar spine. There is a grade 1 anterolisthesis of L4 and L5. There is narrowing of the L5-S1 disc space. Impression: UNREMARKABLE CT SCAN OF THE ABDOMEN AND PELVIS AS DESCRIBED ABOVE All CT scans at this facility use dose modulation, iterative reconstruction, and/or weight based dosing when appropriate to reduce radiation dose to as low as reasonably achievable. Xr Chest (2 Vw)    Result Date: 8/25/2020  EXAMINATION: XR CHEST (2 VW)  CLINICAL HISTORY:  sob COMPARISONS: None  FINDINGS: Two views of the chest are submitted. The cardiac silhouette is enlarged Pulmonary vascular congestion with increased interstitial markings. Right sided trachea. No focal infiltrates. No effusions. No Pneumothoraces. PULMONARY VASCULAR CONGESTION WITH INCREASED INTERSTITIAL MARKINGS. RADIOGRAPHIC FINDINGS COULD SUGGEST EARLY CHF. CHF.  CORRELATE CLINICALLY    Ct Head Wo Contrast    Result Date: 8/25/2020  CT HEAD WO CONTRAST, CT HEAD WO CONTRAST CLINICAL HISTORY:  loss of coordination COMPARISON: July 7, 2019. TECHNIQUE: Multiple unenhanced serial axial images of the brain from the vertex of the skull to the base of the skull were performed. FINDINGS: The ventricles are dilated. Unchanged size configuration. .  No mass. No midline shift. The cisterns are patent. There are white matter and periventricular changes most likely consistent with chronic small vessel disease. Area of low-attenuation the left basal ganglia. Unchanged. Likely old lacunar infarct. Area of old infarct within the right posterior occipital subcortical White matter. No acute intra-axial or extra-axial findings. The visualized osseous structures are unremarkable. The visualized portion of the paranasal sinuses, and mastoids are unremarkable. NO ACUTE INTRA-AXIAL OR EXTRA-AXIAL FINDINGS. IF SIGNS OR SYMPTOMS PERSIST THEN RECOMMEND MRI TO FURTHER EVALUATE IF THERE ARE NO CONTRAINDICATIONS All CT scans at this facility use dose modulation, iterative reconstruction, and/or weight based dosing when appropriate to reduce radiation dose to as low as reasonably achievable. CT HEAD WO CONTRAST, CT HEAD WO CONTRAST at 0349 hours. CLINICAL HISTORY: Confusion COMPARISON: August 24, 2020 2141 hours TECHNIQUE: Multiple unenhanced serial axial images of the brain from the vertex of the skull to the base of the skull were performed. FINDINGS: The ventricles are dilated. Unchanged size configuration. .  No mass. No midline shift. The cisterns are patent. There are white matter and periventricular changes most likely consistent with chronic small vessel disease. 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 Faint hypodensity seen in the right thalamus. Grossly unchanged as compared to prior studies dating back to July 7, 2019. Again note is made of old infarct within right occipital subcortical White matter. No acute intra-axial or extra-axial findings.  The visualized osseous structures are unremarkable. The visualized portion of the paranasal sinuses, and mastoids are unremarkable. IMPRESSION: NO ACUTE INTRA-AXIAL OR EXTRA-AXIAL FINDINGS. IF SIGNS OR SYMPTOMS PERSIST THEN RECOMMEND MRI TO FURTHER EVALUATE IF THERE ARE NO CONTRAINDICATIONS All CT scans at this facility use dose modulation, iterative reconstruction, and/or weight based dosing when appropriate to reduce radiation dose to as low as reasonably achievable. Ct Head Wo Contrast    Result Date: 8/25/2020  CT HEAD WO CONTRAST, CT HEAD WO CONTRAST CLINICAL HISTORY:  loss of coordination COMPARISON: July 7, 2019. TECHNIQUE: Multiple unenhanced serial axial images of the brain from the vertex of the skull to the base of the skull were performed. FINDINGS: The ventricles are dilated. Unchanged size configuration. .  No mass. No midline shift. The cisterns are patent. There are white matter and periventricular changes most likely consistent with chronic small vessel disease. Area of low-attenuation the left basal ganglia. Unchanged. Likely old lacunar infarct. Area of old infarct within the right posterior occipital subcortical White matter. No acute intra-axial or extra-axial findings. The visualized osseous structures are unremarkable. The visualized portion of the paranasal sinuses, and mastoids are unremarkable. NO ACUTE INTRA-AXIAL OR EXTRA-AXIAL FINDINGS. IF SIGNS OR SYMPTOMS PERSIST THEN RECOMMEND MRI TO FURTHER EVALUATE IF THERE ARE NO CONTRAINDICATIONS All CT scans at this facility use dose modulation, iterative reconstruction, and/or weight based dosing when appropriate to reduce radiation dose to as low as reasonably achievable. CT HEAD WO CONTRAST, CT HEAD WO CONTRAST at 0349 hours. CLINICAL HISTORY: Confusion COMPARISON: August 24, 2020 2141 hours TECHNIQUE: Multiple unenhanced serial axial images of the brain from the vertex of the skull to the base of the skull were performed. FINDINGS: The ventricles are dilated.  Unchanged CONTRAST, CT CHEST WO CONTRAST Indication:   flank pain Technique: Multiple serial axial images was performed through the abdomen and pelvis without intravenous or oral administration of contrast..   Images were reconstructed in the axial and coronal and sagittal planes. Comparison: September 9, 2019 Findings: The liver, gallbladder, spleen, pancreas, adrenals,  are unremarkable. The kidneys show no significant perinephric stranding. No nephrolithiasis. No hydronephrosis or hydroureter. No bladder calculi. Large and small bowel show no sign of obstruction. The appendix is not visualized. No pericecal stranding. No diverticulitis. No free air. No free fluid. The visualized abdominal aorta is of normal size and caliber. No significant retroperitoneal adenopathy. There is multilevel degenerative changes of lumbar spine. There is a grade 1 anterolisthesis of L4 and L5. There is narrowing of the L5-S1 disc space. Impression: UNREMARKABLE CT SCAN OF THE ABDOMEN AND PELVIS AS DESCRIBED ABOVE All CT scans at this facility use dose modulation, iterative reconstruction, and/or weight based dosing when appropriate to reduce radiation dose to as low as reasonably achievable. Xr Chest Portable    Result Date: 8/25/2020  EXAMINATION: XR CHEST PORTABLE CLINICAL HISTORY: SHORTNESS OF BREATH COMPARISONS: AUGUST 24, 2020, JUNE 29, 2020 FINDINGS: Osseous structures intact. Cardiopericardial silhouette upper limits of normal. Pulmonary vasculature indistinct. Mild blunting left costophrenic angle. BORDERLINE CARDIOMEGALY WITH INTERSTITIAL EDEMA.      Us Carotid Artery Bilateral    Result Date: 8/26/2020  EXAMINATION:  CAROTID DUPLEX ULTRASONOGRAPHY CLINICAL HISTORY:  MENTAL STATUS CHANGES, CONFUSION COMPARISONS:  March 8, 2019 TECHNIQUE:  B-mode, color flow and spectral Doppler FINDINGS:  ARTERIAL BLOOD FLOW VELOCITY RIGHT PS                                               Prox CCA    92 cm/s             Mid CCA     99 cm/s Dist CCA    78 cm/s              Prox ICA    94 cm/s               Mid ICA     80 cm/s            Dist ICA    232 cm/s             Prox ECA    70 cm/s             Prox VERT   92 cm/s              ICA/CCA     2.3                        LEFT PS Prox CCA    153 cm/s Mid CCA     101 cm/s Dist CCA    92 cm/s Prox ICA    93 cm/s Mid ICA     95 cm/s Dist ICA    146 cm/s Prox ECA    140 cm/s Prox VERT   71 cm/s ICA/CCA     1.4     SCATTERED ATHEROSCLEROSIS AND DIFFUSE INTIMAL THICKENING NOTED BILATERAL ICA AND COMMON CAROTIDS. BILATERAL ICAS ARE VERY TORTUOUS. BILATERAL ANTEGRADE VERTEBRAL FLOW RIGHT ICA BY VELOCITIES APPROXIMATELY 70% HOWEVER THIS MAY BE OVERESTIMATED DUE TO THE TORTUOSITY OF THE ICA. LEFT ICA 50-69% STENOSIS. EKG: TRACING REVIEWED    RECOMMENDATIONS    Risk Management:  routine:  no special precautions necessary    Medications:  Medication as ordered  Neurology workup ongoing    Discussed with the treating physician/ team about the patient and treatment plan  Reviewed the chart    Discussed with the patient risk, benefit, alternative and common side effects for the  proposed medication treatment. Patient is consenting to the treatment. Thanks for the consult. Please call me if needed.     Electronically signed by Katherine Collier MD on 8/26/2020 at 4:03 PM

## 2020-08-26 NOTE — PROGRESS NOTES
Pt's consent for blood is in the chart. Her BP is 80/39, heart rate 63, [employer] sent to Dr. Blake Christie regarding this. Blood transfusion started. Pt is resting.

## 2020-08-26 NOTE — PROGRESS NOTES
Subjective:     Patient ID: [place] is a 67 y.o. female who presents today for confusion. HPI 76-year-old right-handed female admitted to hospital with confusion. Patient reports that she was at home and felt fatigued like she has lost blood. She was brought in the hospital in which she was being given blood and she had a reaction. At home she had difficulty with speaking and felt that her body would not do what she wanted to do she felt just tired just walking from her bedroom to the kitchen and back was tiring for her this going on for 2 days. When I am seeing her she is completely asymptomatic She just feels weak. She denies any confusion and she is very articulate about her history. Denies any headache dizziness denies any numbness or tingling she has no history of dementia. She does have some minor back pain which has been a chronic problem for her she has a history of anxiety depression fibromyalgia in the past    Patient was transferred to regular medical floor today and the patient appears to be very angry as she had not slept the whole night and appears to be very oriented that she did not want to go through most of the examinations as noted. Although she was asleep she will eat later but oriented to self place month and year    Review of Systems   Constitutional: Negative for fever. HENT: Negative for ear pain, tinnitus and trouble swallowing. Eyes: Negative for photophobia and visual disturbance. Respiratory: Negative for choking and shortness of breath. Cardiovascular: Negative for chest pain and palpitations. Gastrointestinal: Negative for nausea and vomiting. Musculoskeletal: Negative for back pain, gait problem, joint swelling, myalgias, neck pain and neck stiffness. Skin: Negative for color change. Allergic/Immunologic: Negative for food allergies.    Neurological: Negative for dizziness, tremors, seizures, syncope, facial asymmetry, speech difficulty, weakness, light-headedness, numbness and headaches. Psychiatric/Behavioral: Negative for behavioral problems, confusion, hallucinations and sleep disturbance. Past Medical History:   Diagnosis Date    Anxiety     Asthma     dx 2019 / has smoked since age 14    CHF (congestive heart failure) (HCC)     Chronic back pain     Bilateral L5 S1 Radic on emg--surprisingly worse on the left than the right--pt's symptoms and her MRI show worse on the right    Chronic obstructive pulmonary disease with acute exacerbation ([facility] [address] 10/12/2019    Depression     Fibromyalgia     Hyperlipidemia     meds > 8 yrs    Hypertension     meds > 45 yrs    On home O2     2l per n/c at bedtime mostly,     Osteoarthritis     Type II diabetes mellitus, uncontrolled ([address]     hx > 8 yrs    Unspecified sleep apnea      Past Surgical History:   Procedure Laterality Date    BACK SURGERY  2017    lumbar disc    CARDIAC CATHETERIZATION  11/3/14     DR. MIRELES / no stents    COLONOSCOPY  08/29/2016    w/polypectomy     DILATION AND CURETTAGE OF UTERUS N/A 10/7/2019    EUA HYSTEROSCOPY DILATATION AND CURETTAGE performed by Naina Mckeon DO at [facility], DIAGNOSTIC      EYE SURGERY      [place] with IOL OU / [address]    umbilical hernia repair    IL ESOPHAGOGASTRODUODENOSCOPY TRANSORAL DIAGNOSTIC N/A 3/24/2017    EGD ESOPHAGOGASTRODUODENOSCOPY performed by Josh Gusman MD at [facility] N/A 2/8/2018    negative findings    IL REVISE MEDIAN N/CARPAL TUNNEL SURG Left 6/5/2017    LEFT  CARPAL TUNNEL RELEASE performed by Madelaine Mosher MD at [facility],7+V/B,NEFAX N/A 2/8/2018    TONSILLECTOMY      as child    UPPER GASTROINTESTINAL ENDOSCOPY  08/26/2016    w/bx     UPPER GASTROINTESTINAL ENDOSCOPY N/A 2/25/2020    EGD possible biopsy performed by Jasmina Flood MD at [address] [place] [address] ENDOSCOPY N/A 2020    EGD PUSH ENTEROSCOPY performed by Laquetta Prader, MD at [facility] Marital status:      Spouse name: Not on file    Number of children: Not on file    Years of education: Not on file    Highest education level: Not on file   Occupational History    Occupation: Retired-   Social Needs    Financial resource strain: Not on file    Food insecurity     Worry: Not on file     Inability: Not on file   [employer] needs     Medical: Not on file     Non-medical: Not on file   Tobacco Use    Smoking status: Former Smoker     Packs/day: 1.00     Years: 59.00     Pack years: 59.00     Types: Cigarettes     Start date: 2017    Smokeless tobacco: Never Used    Tobacco comment: quit 2-3 weeks ago    Substance and Sexual Activity    Alcohol use: Not Currently    Drug use: No    Sexual activity: Yes   Lifestyle    Physical activity     Days per week: Not on file     Minutes per session: Not on file    Stress: Not on file   Relationships    Social connections     Talks on phone: Not on file     Gets together: Not on file     Attends [employer] service: Not on file     Active member of club or organization: Not on file     Attends meetings of clubs or organizations: Not on file     Relationship status: Not on file    Intimate partner violence     Fear of current or ex partner: Not on file     Emotionally abused: Not on file     Physically abused: Not on file     Forced sexual activity: Not on file   Other Topics Concern    Not on file   Social History Narrative    Not on file     Family History   Problem Relation Age of Onset    Heart Disease Father         cardiac bypass    Arthritis Father     Arthritis Mother     Other Mother          at age 80    Other Sister         nuknown health hx    No Known Problems Daughter     Stroke Son      Allergies   Allergen Reactions    Ibuprofen Nausea 50 % IV solution  12.5 g Intravenous PRN Timoteo Gusman MD        glucagon (rDNA) injection 1 mg  1 mg Intramuscular PRN Timoteo Gusman MD        dextrose 5 % solution  100 mL/hr Intravenous PRN Timoteo Gusman MD        carvedilol (COREG) tablet 50 mg  50 mg Oral BID Sherry Church MD   50 mg at 08/25/20 1029    verapamil (CALAN SR) extended release tablet 120 mg  120 mg Oral Nightly Sherry Church MD        rosuvastatin (CRESTOR) tablet 40 mg  40 mg Oral QPM Sherry Church MD        insulin lispro protamine & lispro (HUMALOG MIX) (75-25) 100 UNIT per ML injection vial SUSP 30 Units  30 Units Subcutaneous BID WC Sherry Church MD        hydrALAZINE (APRESOLINE) tablet 100 mg  100 mg Oral BID Sherry Church MD   100 mg at 08/25/20 1029    0.9 % sodium chloride bolus  20 mL Intravenous Once Drake Abraham PA-C            Objective:   Physical Exam only a partial exam was done as patient wanted to be left alone  Vitals signs reviewed. BP (!) 101/47   Pulse 82   Temp 98.6 °F (37 °C) (Oral)   Resp 18   Ht 4' 11\" (1.499 m)   Wt 130 lb (59 kg)   LMP  (LMP Unknown)   SpO2 100%   BMI 26.26 kg/m²     Eyes:      Pupils: Pupils are equal, round, and reactive to light. Neck:      Musculoskeletal: Normal range of motion. Cardiovascular:      Rate and Rhythm: Normal rate and regular rhythm. Heart sounds: No murmur. Pulmonary:      Effort: Pulmonary effort is normal.      Breath sounds: Normal breath sounds. Abdominal:      General: Bowel sounds are normal.   Musculoskeletal: Normal range of motion. Skin:     General: Skin is warm. Neurological:      Mental Status: She is alert and oriented to person, place, and time. Cranial Nerves: No cranial nerve deficit. Sensory: No sensory deficit. Motor: No abnormal muscle tone. Coordination: Coordination normal.      Deep Tendon Reflexes: Reflexes are normal and symmetric. Babinski sign absent on the right side.  Babinski sign absent on the left side. Psychiatric:         Mood and Affect: Mood normal.     Patient was not walked  Xr Chest (2 Vw)    Result Date: 8/25/2020  EXAMINATION: XR CHEST (2 VW)  CLINICAL HISTORY:  sob COMPARISONS: None  FINDINGS: Two views of the chest are submitted. The cardiac silhouette is enlarged Pulmonary vascular congestion with increased interstitial markings. Right sided trachea. No focal infiltrates. No effusions. No Pneumothoraces. PULMONARY VASCULAR CONGESTION WITH INCREASED INTERSTITIAL MARKINGS. RADIOGRAPHIC FINDINGS COULD SUGGEST EARLY CHF. CHF. CORRELATE CLINICALLY    Xr Chest Portable    Result Date: 8/25/2020  EXAMINATION: XR CHEST PORTABLE CLINICAL HISTORY: SHORTNESS OF BREATH COMPARISONS: AUGUST 24, 2020, JUNE 29, 2020 FINDINGS: Osseous structures intact. Cardiopericardial silhouette upper limits of normal. Pulmonary vasculature indistinct. Mild blunting left costophrenic angle. BORDERLINE CARDIOMEGALY WITH INTERSTITIAL EDEMA.        Lab Results   Component Value Date    WBC 11.5 08/25/2020    RBC 3.67 08/25/2020    HGB 7.6 08/25/2020    HCT 26.6 08/25/2020    MCV 72.4 08/25/2020    MCH 20.7 08/25/2020    MCHC 28.6 08/25/2020    RDW 23.5 08/25/2020     08/25/2020    MPV 8.8 08/01/2015     Lab Results   Component Value Date     08/25/2020    K 4.7 08/25/2020    K 4.6 07/03/2020     08/25/2020    CO2 18 08/25/2020    BUN 30 08/25/2020    CREATININE 1.46 08/25/2020    GFRAA 42.1 08/25/2020    LABGLOM 34.8 08/25/2020    GLUCOSE 355 08/25/2020    PROT 7.0 08/25/2020    LABALBU 4.1 08/25/2020    CALCIUM 9.5 08/25/2020    BILITOT 0.3 08/25/2020    ALKPHOS 111 08/25/2020    AST 38 08/25/2020    ALT 20 08/25/2020     Lab Results   Component Value Date    PROTIME 13.2 08/25/2020    INR 1.0 08/25/2020     Lab Results   Component Value Date    TSH 1.200 10/13/2019    Tania Reyes 1555 01/17/2014

## 2020-08-26 NOTE — PROGRESS NOTES
Physician Progress Note      Briana Braden  CSN #:                  [id number]  :                       1945  ADMIT DATE:       2020 8:02 PM  [address] [place] DATE:  RESPONDING  PROVIDER #:        Itz Decker MD          QUERY TEXT:    Dear cardiology. Patient admitted with chest pain, sob, symptomatic anemia and flush pulmonary   edema after blood transfusion. Noted elevated troponin, h/o CKD. If possible,   please document in the progress notes and discharge summary if you are   evaluating and/or treating any of the following: The medical record reflects the following:  Risk Factors: symptomatic anemia, flush pulmonary edema, DMT2, HLD, HTN, COPD,   CHF  Clinical Indicators: trop 0.034/0.027/0.026/0.017;  EKG shows NSR with HR 81,   normal axis, normal intervals, non specific STT changes. T wave inversion in   V4 V5 V6 and also 1 and aVL.  Dr. Delisa De La Garza with elevated Trops. - no active angina.  Dr. Blanchard Heading troponin likely demand ischemia  H&P-in the ER as soon as the transfusion started patient started having   shortness of breath and severe chest pain. Treatment: serial troponin, EKG, Lasix 80 mg IV, Solumedrol, Nitro paste,   Morphine, labetalol IV, Albuterol, Benadryl, ICU care, cardiology consult    Thank you, Vinnie Gonsalez RN BSN [facility]  [phone]  Options provided:  -- Demand Ischemia only, no MI  -- Type 2 MI  -- Demand Ischemia with MI  -- Other - I will add my own diagnosis  -- Disagree - Not applicable / Not valid  -- Disagree - Clinically unable to determine / Unknown  -- Refer to Clinical Documentation Reviewer    PROVIDER RESPONSE TEXT:    This patient has demand ischemia only, no MI.     Query created by: Syd Snachez on 2020 8:45 AM      Electronically signed by:  Itz Decker MD 2020 9:22 AM

## 2020-08-26 NOTE — PROGRESS NOTES
Hospitalist Progress Note      Date of Admission: 8/24/2020  Chief Complaint:    Chief Complaint   Patient presents with    Flank Pain     right     Subjective:  Given recent history of agitations and confusion especially with stimulus, minimal subjective evaluation performed at this time. Discussed with nurse, patient more relaxed and stable currently.   Medications:    Infusion Medications    dextrose       Scheduled Medications    sodium chloride  20 mL Intravenous Once    iron sucrose  200 mg Intravenous Q24H    iron sucrose  200 mg Intravenous Once    ziprasidone  20 mg Intramuscular Once    sodium chloride  20 mL Intravenous Once    morphine  6 mg Intravenous Once    sodium chloride flush  10 mL Intravenous 2 times per day    pantoprazole  40 mg Intravenous Q12H    And    sodium chloride (PF)  10 mL Intravenous Q12H    insulin lispro  0-12 Units Subcutaneous TID WC    insulin lispro  0-6 Units Subcutaneous Nightly    carvedilol  50 mg Oral BID    verapamil  120 mg Oral Nightly    rosuvastatin  40 mg Oral QPM    insulin lispro protamine & lispro  30 Units Subcutaneous BID WC    hydrALAZINE  100 mg Oral BID    sodium chloride flush  10 mL Intravenous 2 times per day    sodium chloride  20 mL Intravenous Once     PRN Meds: sodium chloride flush, acetaminophen **OR** acetaminophen, promethazine **OR** ondansetron, glucose, dextrose, glucagon (rDNA), dextrose, sodium chloride flush    Intake/Output Summary (Last 24 hours) at 8/26/2020 1414  Last data filed at 8/25/2020 2130  Gross per 24 hour   Intake 240 ml   Output 1 ml   Net 239 ml     Exam:  BP (!) 101/47   Pulse 82   Temp 98.6 °F (37 °C) (Oral)   Resp 18   Ht 4' 11\" (1.499 m)   Wt 130 lb (59 kg)   LMP  (LMP Unknown)   SpO2 100%   BMI 26.26 kg/m²   Head: Normocephalic, atraumatic  Sclera clear  Neck: Trachea midline  Lungs: Symmetrical expansion    Labs:   Recent Labs     08/25/20 2058 08/25/20  1559 08/25/20  2307 08/26/20  2977 08/26/20  1333   WBC 11.5*  --  9.7  --  12.8*  --    HGB 7.6*   < > 7.1* 7.2* 6.5* 7.7*   HCT 26.6*   < > 24.0* 24.6* 22.1* 29.8*     --  478*  --  454*  --     < > = values in this interval not displayed. Recent Labs     08/25/20  0613 08/25/20  1559 08/26/20  0727    134* 138   K 4.7 4.8 4.4    97 104   CO2 18* 17* 17*   BUN 30* 45* 54*   CREATININE 1.46* 1.94* 1.83*   CALCIUM 9.5 9.4 9.3   AST 38* 20 23   ALT 20 18 17   BILITOT 0.3 0.3 0.3   ALKPHOS 111 118 93     Recent Labs     08/25/20  0245   INR 1.0     Recent Labs     08/25/20  0613 08/25/20  1135 08/25/20  1559   TROPONINI 0.017* 0.013* 0.019*     Radiology:  RADIOLOGY REPORT   Final Result      CT HEAD WO CONTRAST   Final Result      NO ACUTE INTRA-AXIAL OR EXTRA-AXIAL FINDINGS. IF SIGNS OR SYMPTOMS PERSIST THEN RECOMMEND MRI TO FURTHER EVALUATE IF THERE ARE NO CONTRAINDICATIONS      All CT scans at this facility use dose modulation, iterative reconstruction, and/or weight based dosing when appropriate to reduce radiation dose to as low as reasonably achievable. CT HEAD WO CONTRAST, CT HEAD WO CONTRAST at 0349 hours. CLINICAL HISTORY: Confusion      COMPARISON: August 24, 2020 2141 hours      TECHNIQUE: Multiple unenhanced serial axial images of the brain from the vertex of the skull to the base of the skull were performed. FINDINGS: The ventricles are dilated. Unchanged size configuration. .  No mass. No midline shift. The cisterns are patent. There are white matter and periventricular changes most likely consistent with chronic small vessel disease. 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 Faint hypodensity seen in the right thalamus. Grossly unchanged as compared to prior studies dating back to July 7, 2019. Again note is made of old infarct within right occipital subcortical White matter. No acute intra-axial or extra-axial findings. The visualized osseous structures are unremarkable. The visualized portion of the paranasal sinuses, and mastoids are unremarkable. IMPRESSION:      NO ACUTE INTRA-AXIAL OR EXTRA-AXIAL FINDINGS. IF SIGNS OR SYMPTOMS PERSIST THEN RECOMMEND MRI TO FURTHER EVALUATE IF THERE ARE NO CONTRAINDICATIONS      All CT scans at this facility use dose modulation, iterative reconstruction, and/or weight based dosing when appropriate to reduce radiation dose to as low as reasonably achievable. CT CHEST WO CONTRAST   Final Result   Unremarkable CT scan the chest as described above         All CT scans at this facility use dose modulation, iterative reconstruction, and/or weight based dosing when appropriate to reduce radiation dose to as low as reasonably achievable. Examination: CT ABDOMEN PELVIS WO CONTRAST, CT CHEST WO CONTRAST      Indication:   flank pain       Technique: Multiple serial axial images was performed through the abdomen and pelvis without intravenous or oral administration of contrast..   Images were reconstructed in the axial and coronal and sagittal planes. Comparison: September 9, 2019      Findings: The liver, gallbladder, spleen, pancreas, adrenals,  are unremarkable. The kidneys show no significant perinephric stranding. No nephrolithiasis. No hydronephrosis or hydroureter. No bladder calculi. Large and small bowel show no sign of obstruction. The appendix is not visualized. No pericecal stranding. No diverticulitis. No free air. No free fluid. The visualized abdominal aorta is of normal size and caliber. No significant retroperitoneal adenopathy. There is multilevel degenerative changes of lumbar spine. There is a grade 1 anterolisthesis of L4 and L5. There is narrowing of the L5-S1 disc space.       Impression: UNREMARKABLE CT SCAN OF THE ABDOMEN AND PELVIS AS DESCRIBED ABOVE         All CT scans at this facility use dose modulation, iterative reconstruction, and/or weight based dosing when appropriate to reduce radiation dose to as low as reasonably achievable. XR CHEST PORTABLE   Final Result   BORDERLINE CARDIOMEGALY WITH INTERSTITIAL EDEMA. XR CHEST (2 VW)   Final Result   PULMONARY VASCULAR CONGESTION WITH INCREASED INTERSTITIAL MARKINGS. RADIOGRAPHIC FINDINGS COULD SUGGEST EARLY CHF. CHF. CORRELATE CLINICALLY      CT Head WO Contrast   Final Result      NO ACUTE INTRA-AXIAL OR EXTRA-AXIAL FINDINGS. IF SIGNS OR SYMPTOMS PERSIST THEN RECOMMEND MRI TO FURTHER EVALUATE IF THERE ARE NO CONTRAINDICATIONS      All CT scans at this facility use dose modulation, iterative reconstruction, and/or weight based dosing when appropriate to reduce radiation dose to as low as reasonably achievable. CT HEAD WO CONTRAST, CT HEAD WO CONTRAST at 0349 hours. CLINICAL HISTORY: Confusion      COMPARISON: August 24, 2020 2141 hours      TECHNIQUE: Multiple unenhanced serial axial images of the brain from the vertex of the skull to the base of the skull were performed. FINDINGS: The ventricles are dilated. Unchanged size configuration. .  No mass. No midline shift. The cisterns are patent. There are white matter and periventricular changes most likely consistent with chronic small vessel disease. 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 Faint hypodensity seen in the right thalamus. Grossly unchanged as compared to prior studies dating back to July 7, 2019. Again note is made of old infarct within right occipital subcortical White matter. No acute intra-axial or extra-axial findings. The visualized osseous structures are unremarkable. The visualized portion of the paranasal sinuses, and mastoids are unremarkable. IMPRESSION:      NO ACUTE INTRA-AXIAL OR EXTRA-AXIAL FINDINGS.       IF SIGNS OR SYMPTOMS PERSIST THEN RECOMMEND MRI TO FURTHER EVALUATE IF THERE ARE NO CONTRAINDICATIONS      All CT scans at this facility use dose modulation, iterative reconstruction, and/or weight based dosing when appropriate to reduce radiation dose to as low as reasonably achievable. CT ABDOMEN PELVIS WO CONTRAST Additional Contrast? None   Final Result   Unremarkable CT scan the chest as described above         All CT scans at this facility use dose modulation, iterative reconstruction, and/or weight based dosing when appropriate to reduce radiation dose to as low as reasonably achievable. Examination: CT ABDOMEN PELVIS WO CONTRAST, CT CHEST WO CONTRAST      Indication:   flank pain       Technique: Multiple serial axial images was performed through the abdomen and pelvis without intravenous or oral administration of contrast..   Images were reconstructed in the axial and coronal and sagittal planes. Comparison: September 9, 2019      Findings: The liver, gallbladder, spleen, pancreas, adrenals,  are unremarkable. The kidneys show no significant perinephric stranding. No nephrolithiasis. No hydronephrosis or hydroureter. No bladder calculi. Large and small bowel show no sign of obstruction. The appendix is not visualized. No pericecal stranding. No diverticulitis. No free air. No free fluid. The visualized abdominal aorta is of normal size and caliber. No significant retroperitoneal adenopathy. There is multilevel degenerative changes of lumbar spine. There is a grade 1 anterolisthesis of L4 and L5. There is narrowing of the L5-S1 disc space. Impression: UNREMARKABLE CT SCAN OF THE ABDOMEN AND PELVIS AS DESCRIBED ABOVE         All CT scans at this facility use dose modulation, iterative reconstruction, and/or weight based dosing when appropriate to reduce radiation dose to as low as reasonably achievable. US CAROTID ARTERY BILATERAL    (Results Pending)     Assessment/Plan:    Altered mental status/encephalopathy: Work-up in progress.   Differential including but not limited to infection, anemia, vascular dementia, medications. Agitation: Significantly improved with Seroquel. Family redirecting has also been of benefit. Given underlying concern for possible vascular dementia, current level of waxing and waning agitation, psychiatry consulted. Symptomatic anemia: PRBC transfusion when able. Haptoglobin/LDH pending. Hematology on consult    Severe iron deficiency anemia: Iron supplementation by IV    Diabetes: Continue current regimen, monitor glucose accordion. Hyperglycemia likely from  high-dose steroids given in the emergency room. On insulin sliding scale. Hypertension: Continue current regimen.   Monitoring    Hyperlipidemia: On rosuvastatin    35 minutes total care time, >1/2 in unit/floor time and care coordination     Electronically signed by Mary Anne Alexis MD on 8/26/2020 at 2:14 PM

## 2020-08-26 NOTE — PROGRESS NOTES
Progress Note  Patient: Samy Vargas  Unit/Bed: Y189/S658-02  YOB: 1945  MRN: [id number]  Acct: [de-identified]   Admitting Diagnosis: Symptomatic anemia [D64.9]  Symptomatic anemia [D64.9]  Admit Date:  8/24/2020  Hospital Day: 1    Chief Complaint: anemia MS changes    Histories:  Past Medical History:   Diagnosis Date    Anxiety     Asthma     dx 2019 / has smoked since age 14    CHF (congestive heart failure) ([facility])     Chronic back pain     Bilateral L5 S1 Radic on emg--surprisingly worse on the left than the right--pt's symptoms and her MRI show worse on the right    Chronic obstructive pulmonary disease with acute exacerbation ([facility] [address] 10/12/2019    Depression     Fibromyalgia     Hyperlipidemia     meds > 8 yrs    Hypertension     meds > 45 yrs    On home O2     2l per n/c at bedtime mostly,     Osteoarthritis     Type II diabetes mellitus, uncontrolled ([address]     hx > 8 yrs    Unspecified sleep apnea      Past Surgical History:   Procedure Laterality Date    BACK SURGERY  2017    lumbar disc    CARDIAC CATHETERIZATION  11/3/14     DR. MIRELES / no stents    COLONOSCOPY  08/29/2016    w/polypectomy     DILATION AND CURETTAGE OF UTERUS N/A 10/7/2019    EUA HYSTEROSCOPY DILATATION AND CURETTAGE performed by Stacy Woods DO at [facility]. [address], COLON, DIAGNOSTIC      EYE SURGERY      [place] with IOL OU / [address] [place]  [id number]    umbilical hernia repair    SD ESOPHAGOGASTRODUODENOSCOPY TRANSORAL DIAGNOSTIC N/A 3/24/2017    EGD ESOPHAGOGASTRODUODENOSCOPY performed by Lindsey Trinidad MD at [facility] N/A 2/8/2018    negative findings    SD REVISE MEDIAN N/CARPAL TUNNEL SURG Left 6/5/2017    LEFT  CARPAL TUNNEL RELEASE performed by Filomena Cloud MD at [facility],0+A/J,LJGDR N/A 2/8/2018    TONSILLECTOMY      as child    UPPER GASTROINTESTINAL ENDOSCOPY  08/26/2016    w/bx     UPPER GASTROINTESTINAL ENDOSCOPY N/A 2020    EGD possible biopsy performed by Nohemy Edgar MD at [facility] N/A 2020    EGD PUSH ENTEROSCOPY performed by Makayla Ortega MD at [facility]     Family History   Problem Relation Age of Onset    Heart Disease Father         cardiac bypass    Arthritis Father     Arthritis Mother     Other Mother          at age 80    Other Sister         [facility]    No Known Problems Daughter     Stroke Son      Social History     Socioeconomic History    Marital status:      Spouse name: None    Number of children: None    Years of education: None    Highest education level: None   Occupational History    Occupation: Retired-   Social Needs    Financial resource strain: None    Food insecurity     Worry: None     Inability: None    Transportation needs     Medical: None     Non-medical: None   Tobacco Use    Smoking status: Former Smoker     Packs/day: 1.00     Years: 59.00     Pack years: 59.00     Types: Cigarettes     Start date: 2017    Smokeless tobacco: Never Used    Tobacco comment: quit 2-3 weeks ago    Substance and Sexual Activity    Alcohol use: Not Currently    Drug use: No    Sexual activity: Yes   Lifestyle    Physical activity     Days per week: None     Minutes per session: None    Stress: None   Relationships    Social connections     Talks on phone: None     Gets together: None     Attends Hinduism service: None     Active member of club or organization: None     Attends meetings of clubs or organizations: None     Relationship status: None    Intimate partner violence     Fear of current or ex partner: None     Emotionally abused: None     Physically abused: None     Forced sexual activity: None   Other Topics Concern    None   Social History Narrative    None       Subjective/HPI sitting up in bed.  in room. No cp no sob. EKG:        Review of Systems:   Review of Systems   Constitutional: Negative. Negative for diaphoresis and fatigue. HENT: Negative. Eyes: Negative. Respiratory: Negative. Negative for cough, chest tightness, shortness of breath, wheezing and stridor. Cardiovascular: Negative. Negative for chest pain, palpitations and leg swelling. Gastrointestinal: Negative. Negative for blood in stool and nausea. Genitourinary: Negative. Musculoskeletal: Negative. Skin: Negative. Neurological: Positive for weakness. Negative for dizziness, syncope and light-headedness. Hematological: Negative. Psychiatric/Behavioral: Negative. Physical Examination:    /70   Pulse 97   Temp 98.6 °F (37 °C) (Oral)   Resp 18   Ht 4' 11\" (1.499 m)   Wt 130 lb (59 kg)   LMP  (LMP Unknown)   SpO2 100%   BMI 26.26 kg/m²    Physical Exam   Constitutional: She appears healthy. No distress. HENT:   Normal cephalic and Atraumatic   Eyes: Pupils are equal, round, and reactive to light. Neck: Normal range of motion and thyroid normal. Neck supple. No JVD present. No neck adenopathy. No thyromegaly present. Cardiovascular: Normal rate, regular rhythm, intact distal pulses and normal pulses. Murmur heard. Pulmonary/Chest: Effort normal and breath sounds normal. She has no wheezes. She has no rales. She exhibits no tenderness. Abdominal: Soft. Bowel sounds are normal. There is no abdominal tenderness. Musculoskeletal: Normal range of motion. General: No tenderness or edema. Neurological: She is alert and oriented to person, place, and time. Skin: Skin is warm. No cyanosis. Nails show no clubbing.        LABS:  CBC:   Lab Results   Component Value Date    WBC 12.8 08/26/2020    RBC 3.16 08/26/2020    HGB 6.5 08/26/2020    HCT 22.1 08/26/2020    MCV 69.9 08/26/2020    MCH 20.6 08/26/2020    MCHC 29.5 08/26/2020    RDW 23.6 08/26/2020     08/26/2020    MPV 8.8 08/01/2015 CBC with Differential:    Lab Results   Component Value Date    WBC 12.8 08/26/2020    RBC 3.16 08/26/2020    HGB 6.5 08/26/2020    HCT 22.1 08/26/2020     08/26/2020    MCV 69.9 08/26/2020    MCH 20.6 08/26/2020    MCHC 29.5 08/26/2020    RDW 23.6 08/26/2020    NRBC 1 06/30/2020    LYMPHOPCT 6.1 08/26/2020    MONOPCT 3.4 08/26/2020    BASOPCT 0.1 08/26/2020    MONOSABS 0.4 08/26/2020    LYMPHSABS 0.8 08/26/2020    EOSABS 0.0 08/26/2020    BASOSABS 0.0 08/26/2020     CMP:    Lab Results   Component Value Date     08/26/2020    K 4.4 08/26/2020    K 4.6 07/03/2020     08/26/2020    CO2 17 08/26/2020    BUN 54 08/26/2020    CREATININE 1.83 08/26/2020    GFRAA 32.5 08/26/2020    LABGLOM 26.8 08/26/2020    GLUCOSE 325 08/26/2020    PROT 6.1 08/26/2020    LABALBU 3.7 08/26/2020    CALCIUM 9.3 08/26/2020    BILITOT 0.3 08/26/2020    ALKPHOS 93 08/26/2020    AST 23 08/26/2020    ALT 17 08/26/2020     BMP:    Lab Results   Component Value Date     08/26/2020    K 4.4 08/26/2020    K 4.6 07/03/2020     08/26/2020    CO2 17 08/26/2020    BUN 54 08/26/2020    LABALBU 3.7 08/26/2020    CREATININE 1.83 08/26/2020    CALCIUM 9.3 08/26/2020    GFRAA 32.5 08/26/2020    LABGLOM 26.8 08/26/2020    GLUCOSE 325 08/26/2020     Magnesium:    Lab Results   Component Value Date    MG 2.5 08/26/2020     Troponin:    Lab Results   Component Value Date    TROPONINI 0.019 08/25/2020        Active Hospital Problems    Diagnosis Date Noted    [facility] [J81.0] 08/25/2020     Priority: Low    Acute on chronic kidney failure ([address] [N17.9, N18.9] 08/25/2020     Priority: Low    Dysarthria [R47.1]      Priority: Low    Chronic fatigue [R53.82]      Priority: Low    Symptomatic anemia [D64.9] 08/24/2020     Priority: Low    Uncontrolled type 2 diabetes mellitus with hyperglycemia (HCC) [E11.65]      Priority: Low        Assessment/Plan:  1. Anemia - worse. Will need transfusion now.    2. H/o Gastric AVM  3. MS changes - resolved. 4. Milld CAD  5. H/o HOCM   6. CKD with elevated Trops. - no active angina. 7. Continue CV protective meds  8. CUS Moderate plaque B/L. ICAs are very tortuous and likely causing elevated Velocities. - will address as out pt.         Electronically signed by Froylan Robledo MD on 8/26/2020 at 10:40 AM

## 2020-08-26 NOTE — CONSULTS
Hematology/Oncology Consult  Encounter Date: 2020 9:56 AM    Ms. Carisa Johnson is a 67 y.o. female  : 1945  MRN: [id number]  Acct Number: [de-identified]  Requesting Provider: DR Percy Cancino    Reason for request: Anemia      CONSULTANT: Mahendra Cruz    HPI: Ki Zuleta was admitted for dypsnea on exertion. CBC showed hemoglobin 7.2, WBC 9300 and platelets at [id number]. Admitted in 2020. Endoscopy showed anthral erosions and non bleeding AVMs. Serum iron 22, TIBC 347, percent saturation 6 and ferritin 27.      Patient Active Problem List   Diagnosis    Hypertension    Hyperlipidemia    Uncontrolled type 2 diabetes mellitus with complication, without long-term current use of insulin (HCC)    Fibromyalgia    Anxiety    Smoker    Insomnia    DJD (degenerative joint disease), lumbar    Lumbosacral radiculopathy at S1    DDD (degenerative disc disease), lumbar    Dysphonia    CTS (carpal tunnel syndrome)    High risk medication use-Norco - 17 OARRS PM&R, 18 OARRS PM&R, 18 OARRS PM&R, Urine Drug screen negative 17 PM&R--MED CONTRACT 17    SOB (shortness of breath) on exertion    Chest pain    Memory deficit    Artificial lens present    Presbyopia    Astigmatism, regular    Cataract, nuclear sclerotic senile    IDDM (insulin dependent diabetes mellitus) ([address]    Regular astigmatism    Nuclear senile cataract    Neck pain    Lumbosacral radiculopathy at L5    Spinal stenosis of lumbar region with neurogenic claudication    Impaired mobility and activities of daily living    Non-compliant patient NO showed FU 1/10/17 Dr Charlie Dawson Insomnia secondary to chronic pain    Reactive depression    Diabetic radiculopathy (HCC)    Cervical radicular pain    Diabetic asymmetric polyneuropathy (HCC)    Myalgia    Intercostal neuropathy    Osteoarthritis of spine with radiculopathy, lumbar region    Acute combined systolic and diastolic CHF, NYHA class 1 ([address]    PMB (postmenopausal bleeding)    Acute on chronic diastolic heart failure (HCC)    CHF (congestive heart failure) (HCC)    Hypertensive urgency    Uncontrolled type 2 diabetes mellitus with hyperglycemia (HCC)    Chronic obstructive pulmonary disease with acute exacerbation (HCC)    Acute on chronic diastolic (congestive) heart failure (HCC)    [facility]    Hypoglycemia    HOCM (hypertrophic obstructive cardiomyopathy) (HCC)    Gastrointestinal hemorrhage    COPD exacerbation (HCC)    Anemia    AVM (arteriovenous malformation)    Symptomatic anemia    Flash pulmonary edema (HCC)    Acute on chronic kidney failure (HCC)    Dysarthria    Chronic fatigue     Past Medical History:   Diagnosis Date    Anxiety     Asthma     dx 2019 / has smoked since age 14    CHF [facility]congestive heart failure) ([facility])     Chronic back pain     Bilateral L5 S1 Radic on emg--surprisingly worse on the left than the right--pt's symptoms and her MRI show worse on the right    Chronic obstructive pulmonary disease with acute exacerbation ([facility] 10/12/2019    Depression     Fibromyalgia     Hyperlipidemia     meds > 8 yrs    Hypertension     meds > 45 yrs    On home O2     2l per n/c at bedtime mostly,     Osteoarthritis     Type II diabetes mellitus, uncontrolled ([facility])     hx > 8 yrs    Unspecified sleep apnea      @PSH@  Family History   Problem Relation Age of Onset    Heart Disease Father         cardiac bypass    Arthritis Father     Arthritis Mother     Other Mother          at age 80    Other Sister         [facility]    No Known Problems Daughter     Stroke Son      Social History     Socioeconomic History    Marital status:      Spouse name: Not on file    Number of children: Not on file    Years of education: Not on file    Highest education level: Not on file   Occupational History    Occupation: Retired-   Social Needs    Financial resource chloride flush 0.9 % injection 10 mL  10 mL Intravenous PRN Cheryl Chowdary RN, NP        acetaminophen (TYLENOL) tablet 650 mg  650 mg Oral Q6H PRN Cheryl Chowdary RN, NP        Or    acetaminophen (TYLENOL) suppository 650 mg  650 mg Rectal Q6H PRN Cheryl Chowdary RN, NP        promethazine (PHENERGAN) tablet 12.5 mg  12.5 mg Oral Q6H PRN Cheryl hCowdary RN, NP        Or    ondansetron (ZOFRAN) injection 4 mg  4 mg Intravenous Q6H PRN Cheryl Chowdary RN, NP        pantoprazole (PROTONIX) injection 40 mg  40 mg Intravenous Q12H Cheryl Chowdary RN, NP   40 mg at 08/26/20 0749    And    sodium chloride (PF) 0.9 % injection 10 mL  10 mL Intravenous Q12H Cheryl Chowdary RN, NP   10 mL at 08/26/20 0749    insulin lispro (HUMALOG) injection vial 0-12 Units  0-12 Units Subcutaneous TID  Kevon Suarez MD   12 Units at 08/26/20 0900    insulin lispro (HUMALOG) injection vial 0-6 Units  0-6 Units Subcutaneous Nightly Kevon Suarez MD   2 Units at 08/25/20 2159    glucose (GLUTOSE) 40 % oral gel 15 g  15 g Oral PRN Kevon Suarez MD        dextrose 50 % IV solution  12.5 g Intravenous PRN Kevon Suarez MD        glucagon (rDNA) injection 1 mg  1 mg Intramuscular PRN Kevon Suarez MD        dextrose 5 % solution  100 mL/hr Intravenous PRN Kevon Suarez MD        carvedilol (COREG) tablet 50 mg  50 mg Oral BID Alex Chester MD   50 mg at 08/25/20 2156    verapamil (CALAN SR) extended release tablet 120 mg  120 mg Oral Nightly Alex Chester MD   120 mg at 08/25/20 2156    rosuvastatin (CRESTOR) tablet 40 mg  40 mg Oral QPM Alex Chester MD   40 mg at 08/25/20 1852    insulin lispro protamine & lispro (HUMALOG MIX) (75-25) 100 UNIT per ML injection vial SUSP 30 Units  30 Units Subcutaneous BID  Alex Chester MD   30 Units at 08/26/20 0894    hydrALAZINE (APRESOLINE) tablet 100 mg  100 mg Oral BID Alex Chester MD   100 mg at 08/25/20 1842 Collection Time: 08/25/20 11:35 AM   Result Value Ref Range    Troponin 0.013 (HH) 0.000 - 0.010 ng/mL   Lactate, Sepsis    Collection Time: 08/25/20 11:38 AM   Result Value Ref Range    Lactic Acid, Sepsis 6.3 (HH) 0.5 - 1.9 mmol/L   POCT Glucose    Collection Time: 08/25/20 12:23 PM   Result Value Ref Range    POC Glucose 498 (HH) 60 - 115 mg/dl    Performed on ACCU-CHEK    Lactate, Sepsis    Collection Time: 08/25/20  3:59 PM   Result Value Ref Range    Lactic Acid, Sepsis 5.2 (HH) 0.5 - 1.9 mmol/L   CBC Auto Differential    Collection Time: 08/25/20  3:59 PM   Result Value Ref Range    WBC 9.7 4.8 - 10.8 K/uL    RBC 3.43 (L) 4.20 - 5.40 M/uL    Hemoglobin 7.1 (L) 12.0 - 16.0 g/dL    Hematocrit 24.0 (L) 37.0 - 47.0 %    MCV 69.9 (L) 82.0 - 100.0 fL    MCH 20.7 (L) 27.0 - 31.3 pg    MCHC 29.6 (L) 33.0 - 37.0 %    RDW 23.8 (H) 11.5 - 14.5 %    Platelets 885 (H) 613 - 400 K/uL    SLIDE REVIEW see below     Neutrophils % 92.2 %    Lymphocytes % 7.0 %    Monocytes % 0.6 %    Eosinophils % 0.0 %    Basophils % 0.2 %    Neutrophils Absolute 9.0 (H) 1.4 - 6.5 K/uL    Lymphocytes Absolute 0.7 (L) 1.0 - 4.8 K/uL    Monocytes Absolute 0.1 (L) 0.2 - 0.8 K/uL    Eosinophils Absolute 0.0 0.0 - 0.7 K/uL    Basophils Absolute 0.0 0.0 - 0.2 K/uL    Anisocytosis 2+     Microcytes 1+     Polychromasia 1+     Hypochromia 1+     Poikilocytes 1+     Ovalocytes 1+    Comprehensive Metabolic Panel    Collection Time: 08/25/20  3:59 PM   Result Value Ref Range    Sodium 134 (L) 135 - 144 mEq/L    Potassium 4.8 3.4 - 4.9 mEq/L    Chloride 97 95 - 107 mEq/L    CO2 17 (L) 20 - 31 mEq/L    Anion Gap 20 (H) 9 - 15 mEq/L    Glucose 420 (HH) 70 - 99 mg/dL    BUN 45 (H) 8 - 23 mg/dL    CREATININE 1.94 (H) 0.50 - 0.90 mg/dL    GFR Non-African American 25.1 (L) >60    GFR  30.4 (L) >60    Calcium 9.4 8.5 - 9.9 mg/dL    Total Protein 6.9 6.3 - 8.0 g/dL    Alb 4.2 3.5 - 4.6 g/dL    Total Bilirubin 0.3 0.2 - 0.7 mg/dL    Alkaline Phosphatase 118 40 - 130 U/L    ALT 18 0 - 33 U/L    AST 20 0 - 35 U/L    Globulin 2.7 2.3 - 3.5 g/dL   Troponin    Collection Time: 08/25/20  3:59 PM   Result Value Ref Range    Troponin 0.019 (HH) 0.000 - 0.010 ng/mL   POCT Glucose    Collection Time: 08/25/20  4:07 PM   Result Value Ref Range    POC Glucose 485 (HH) 60 - 115 mg/dl    Performed on ACCU-CHEK    POCT Glucose    Collection Time: 08/25/20  9:51 PM   Result Value Ref Range    POC Glucose 225 (H) 60 - 115 mg/dl    Performed on ACCU-CHEK    Hemoglobin and Hematocrit, Blood    Collection Time: 08/25/20 11:07 PM   Result Value Ref Range    Hemoglobin 7.2 (L) 12.0 - 16.0 g/dL    Hematocrit 24.6 (L) 37.0 - 47.0 %   CBC Auto Differential    Collection Time: 08/26/20  7:27 AM   Result Value Ref Range    WBC 12.8 (H) 4.8 - 10.8 K/uL    RBC 3.16 (L) 4.20 - 5.40 M/uL    Hemoglobin 6.5 (LL) 12.0 - 16.0 g/dL    Hematocrit 22.1 (L) 37.0 - 47.0 %    MCV 69.9 (L) 82.0 - 100.0 fL    MCH 20.6 (L) 27.0 - 31.3 pg    MCHC 29.5 (L) 33.0 - 37.0 %    RDW 23.6 (H) 11.5 - 14.5 %    Platelets 388 (H) 833 - 400 K/uL    Neutrophils % 90.4 %    Lymphocytes % 6.1 %    Monocytes % 3.4 %    Eosinophils % 0.0 %    Basophils % 0.1 %    Neutrophils Absolute 11.6 (H) 1.4 - 6.5 K/uL    Lymphocytes Absolute 0.8 (L) 1.0 - 4.8 K/uL    Monocytes Absolute 0.4 0.2 - 0.8 K/uL    Eosinophils Absolute 0.0 0.0 - 0.7 K/uL    Basophils Absolute 0.0 0.0 - 0.2 K/uL   Comprehensive Metabolic Panel    Collection Time: 08/26/20  7:27 AM   Result Value Ref Range    Sodium 138 135 - 144 mEq/L    Potassium 4.4 3.4 - 4.9 mEq/L    Chloride 104 95 - 107 mEq/L    CO2 17 (L) 20 - 31 mEq/L    Anion Gap 17 (H) 9 - 15 mEq/L    Glucose 325 (H) 70 - 99 mg/dL    BUN 54 (H) 8 - 23 mg/dL    CREATININE 1.83 (H) 0.50 - 0.90 mg/dL    GFR Non-African American 26.8 (L) >60    GFR  32.5 (L) >60    Calcium 9.3 8.5 - 9.9 mg/dL    Total Protein 6.1 (L) 6.3 - 8.0 g/dL    Alb 3.7 3.5 - 4.6 g/dL    Total Bilirubin 0.3 0.2 - 0.7 mg/dL    Alkaline Phosphatase 93 40 - 130 U/L    ALT 17 0 - 33 U/L    AST 23 0 - 35 U/L    Globulin 2.4 2.3 - 3.5 g/dL   Magnesium    Collection Time: 08/26/20  7:27 AM   Result Value Ref Range    Magnesium 2.5 (H) 1.7 - 2.4 mg/dL   POCT Glucose    Collection Time: 08/26/20  7:53 AM   Result Value Ref Range    POC Glucose 407 (HH) 60 - 115 mg/dl    Performed on ACCU-CHEK      Recent Labs     08/25/20  0850  08/26/20  0727   COLORU Yellow  --   --    PHUR 5.5  --   --    WBCUA 3-5  --   --    RBCUA 0-2  --   --    BACTERIA Negative  --   --    CLARITYU Clear  --   --    SPECGRAV 1.010  --   --    LEUKOCYTESUR TRACE*  --   --    UROBILINOGEN 0.2  --   --    BILIRUBINUR Negative  --   --    BLOODU Negative  --   --    GLUCOSE  --    < > 325*    < > = values in this interval not displayed. Pathology:     RADIOLOGY RESULTS:  Ct Abdomen Pelvis Wo Contrast Additional Contrast? None    Result Date: 8/25/2020  EXAMINATION:  CT SCAN THE CHEST CLINICAL HISTORY:  Short of breath COMPARISON:  March 24, 2019 TECHNIQUE:  Multiple serial axial images of the chest from the base the neck through the upper abdomen with both sagittal coronal reconstruction was performed without intravenous or oral administration of contrast. FINDINGS:  There is a patchy mosaic appearance lung parenchyma that can be seen with small airways disease. There is bibasilar areas of atelectasis, scarring. No focal infiltrates. No effusions no pneumothoraces. No significant periaortic adenopathy. There is pretracheal adenopathy. There is multilevel degenerative changes with osteophytes of the thoracic spine. Unremarkable CT scan the chest as described above All CT scans at this facility use dose modulation, iterative reconstruction, and/or weight based dosing when appropriate to reduce radiation dose to as low as reasonably achievable.  Examination: CT ABDOMEN PELVIS WO CONTRAST, CT CHEST WO CONTRAST Indication:   flank pain Technique: base of the skull were performed. FINDINGS: The ventricles are dilated. Unchanged size configuration. .  No mass. No midline shift. The cisterns are patent. There are white matter and periventricular changes most likely consistent with chronic small vessel disease. Area of low-attenuation the left basal ganglia. Unchanged. Likely old lacunar infarct. Area of old infarct within the right posterior occipital subcortical White matter. No acute intra-axial or extra-axial findings. The visualized osseous structures are unremarkable. The visualized portion of the paranasal sinuses, and mastoids are unremarkable. NO ACUTE INTRA-AXIAL OR EXTRA-AXIAL FINDINGS. IF SIGNS OR SYMPTOMS PERSIST THEN RECOMMEND MRI TO FURTHER EVALUATE IF THERE ARE NO CONTRAINDICATIONS All CT scans at this facility use dose modulation, iterative reconstruction, and/or weight based dosing when appropriate to reduce radiation dose to as low as reasonably achievable. CT HEAD WO CONTRAST, CT HEAD WO CONTRAST at 0349 hours. CLINICAL HISTORY: Confusion COMPARISON: August 24, 2020 2141 hours TECHNIQUE: Multiple unenhanced serial axial images of the brain from the vertex of the skull to the base of the skull were performed. FINDINGS: The ventricles are dilated. Unchanged size configuration. .  No mass. No midline shift. The cisterns are patent. There are white matter and periventricular changes most likely consistent with chronic small vessel disease. 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 Faint hypodensity seen in the right thalamus. Grossly unchanged as compared to prior studies dating back to July 7, 2019. Again note is made of old infarct within right occipital subcortical White matter. No acute intra-axial or extra-axial findings. The visualized osseous structures are unremarkable. The visualized portion of the paranasal sinuses, and mastoids are unremarkable. IMPRESSION: NO ACUTE INTRA-AXIAL OR EXTRA-AXIAL FINDINGS.  IF SIGNS OR SYMPTOMS PERSIST disease. 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 Faint hypodensity seen in the right thalamus. Grossly unchanged as compared to prior studies dating back to July 7, 2019. Again note is made of old infarct within right occipital subcortical White matter. No acute intra-axial or extra-axial findings. The visualized osseous structures are unremarkable. The visualized portion of the paranasal sinuses, and mastoids are unremarkable. IMPRESSION: NO ACUTE INTRA-AXIAL OR EXTRA-AXIAL FINDINGS. IF SIGNS OR SYMPTOMS PERSIST THEN RECOMMEND MRI TO FURTHER EVALUATE IF THERE ARE NO CONTRAINDICATIONS All CT scans at this facility use dose modulation, iterative reconstruction, and/or weight based dosing when appropriate to reduce radiation dose to as low as reasonably achievable. Ct Chest Wo Contrast    Result Date: 8/25/2020  EXAMINATION:  CT SCAN THE CHEST CLINICAL HISTORY:  Short of breath COMPARISON:  March 24, 2019 TECHNIQUE:  Multiple serial axial images of the chest from the base the neck through the upper abdomen with both sagittal coronal reconstruction was performed without intravenous or oral administration of contrast. FINDINGS:  There is a patchy mosaic appearance lung parenchyma that can be seen with small airways disease. There is bibasilar areas of atelectasis, scarring. No focal infiltrates. No effusions no pneumothoraces. No significant periaortic adenopathy. There is pretracheal adenopathy. There is multilevel degenerative changes with osteophytes of the thoracic spine. Unremarkable CT scan the chest as described above All CT scans at this facility use dose modulation, iterative reconstruction, and/or weight based dosing when appropriate to reduce radiation dose to as low as reasonably achievable.  Examination: CT ABDOMEN PELVIS WO CONTRAST, CT CHEST WO CONTRAST Indication:   flank pain Technique: Multiple serial axial images was performed through the abdomen and pelvis without intravenous or oral administration of contrast..   Images were reconstructed in the axial and coronal and sagittal planes. Comparison: September 9, 2019 Findings: The liver, gallbladder, spleen, pancreas, adrenals,  are unremarkable. The kidneys show no significant perinephric stranding. No nephrolithiasis. No hydronephrosis or hydroureter. No bladder calculi. Large and small bowel show no sign of obstruction. The appendix is not visualized. No pericecal stranding. No diverticulitis. No free air. No free fluid. The visualized abdominal aorta is of normal size and caliber. No significant retroperitoneal adenopathy. There is multilevel degenerative changes of lumbar spine. There is a grade 1 anterolisthesis of L4 and L5. There is narrowing of the L5-S1 disc space. Impression: UNREMARKABLE CT SCAN OF THE ABDOMEN AND PELVIS AS DESCRIBED ABOVE All CT scans at this facility use dose modulation, iterative reconstruction, and/or weight based dosing when appropriate to reduce radiation dose to as low as reasonably achievable. Xr Chest Portable    Result Date: 8/25/2020  EXAMINATION: XR CHEST PORTABLE CLINICAL HISTORY: SHORTNESS OF BREATH COMPARISONS: AUGUST 24, 2020, JUNE 29, 2020 FINDINGS: Osseous structures intact. Cardiopericardial silhouette upper limits of normal. Pulmonary vasculature indistinct. Mild blunting left costophrenic angle. BORDERLINE CARDIOMEGALY WITH INTERSTITIAL EDEMA. ASSESSMENT AND PLAN  1. Anemia with iron deficiency likely from intermittent bleeding thru the AVMs. She will benefit from parenteral iron.      Electronically signed by Heather Carrero MD on 8/26/2020 at 9:56 AM

## 2020-08-27 ENCOUNTER — APPOINTMENT (OUTPATIENT)
Dept: MRI IMAGING | Age: 75
DRG: 811 | End: 2020-08-27
Payer: MEDICARE

## 2020-08-27 ENCOUNTER — CLINICAL DOCUMENTATION (OUTPATIENT)
Dept: MRI IMAGING | Age: 75
End: 2020-08-27

## 2020-08-27 LAB
ALBUMIN SERPL-MCNC: 3.6 G/DL (ref 3.5–4.6)
ALP BLD-CCNC: 120 U/L (ref 40–130)
ALT SERPL-CCNC: 20 U/L (ref 0–33)
AMMONIA: 48 UMOL/L (ref 11–51)
ANION GAP SERPL CALCULATED.3IONS-SCNC: 9 MEQ/L (ref 9–15)
ANISOCYTOSIS: ABNORMAL
AST SERPL-CCNC: 30 U/L (ref 0–35)
BASE EXCESS ARTERIAL: -2 (ref -3–3)
BASOPHILS ABSOLUTE: 0.1 K/UL (ref 0–0.2)
BASOPHILS RELATIVE PERCENT: 1 %
BILIRUB SERPL-MCNC: <0.2 MG/DL (ref 0.2–0.7)
BUN BLDV-MCNC: 71 MG/DL (ref 8–23)
CALCIUM IONIZED: 1.32 MMOL/L (ref 1.12–1.32)
CALCIUM SERPL-MCNC: 9.1 MG/DL (ref 8.5–9.9)
CHLORIDE BLD-SCNC: 106 MEQ/L (ref 95–107)
CO2: 21 MEQ/L (ref 20–31)
CREAT SERPL-MCNC: 2.27 MG/DL (ref 0.5–0.9)
EKG ATRIAL RATE: 115 BPM
EKG ATRIAL RATE: 68 BPM
EKG ATRIAL RATE: 75 BPM
EKG ATRIAL RATE: 80 BPM
EKG ATRIAL RATE: 81 BPM
EKG ATRIAL RATE: 99 BPM
EKG P AXIS: 20 DEGREES
EKG P AXIS: 38 DEGREES
EKG P AXIS: 44 DEGREES
EKG P AXIS: 49 DEGREES
EKG P AXIS: 54 DEGREES
EKG P AXIS: 69 DEGREES
EKG P-R INTERVAL: 130 MS
EKG P-R INTERVAL: 132 MS
EKG P-R INTERVAL: 132 MS
EKG P-R INTERVAL: 136 MS
EKG P-R INTERVAL: 136 MS
EKG P-R INTERVAL: 140 MS
EKG Q-T INTERVAL: 322 MS
EKG Q-T INTERVAL: 336 MS
EKG Q-T INTERVAL: 368 MS
EKG Q-T INTERVAL: 372 MS
EKG Q-T INTERVAL: 378 MS
EKG Q-T INTERVAL: 392 MS
EKG QRS DURATION: 70 MS
EKG QRS DURATION: 70 MS
EKG QRS DURATION: 78 MS
EKG QRS DURATION: 80 MS
EKG QRS DURATION: 84 MS
EKG QRS DURATION: 96 MS
EKG QTC CALCULATION (BAZETT): 410 MS
EKG QTC CALCULATION (BAZETT): 416 MS
EKG QTC CALCULATION (BAZETT): 431 MS
EKG QTC CALCULATION (BAZETT): 432 MS
EKG QTC CALCULATION (BAZETT): 435 MS
EKG QTC CALCULATION (BAZETT): 445 MS
EKG R AXIS: 43 DEGREES
EKG R AXIS: 45 DEGREES
EKG R AXIS: 45 DEGREES
EKG R AXIS: 47 DEGREES
EKG R AXIS: 51 DEGREES
EKG R AXIS: 65 DEGREES
EKG T AXIS: 171 DEGREES
EKG T AXIS: 184 DEGREES
EKG T AXIS: 191 DEGREES
EKG T AXIS: 194 DEGREES
EKG T AXIS: 198 DEGREES
EKG T AXIS: 200 DEGREES
EKG VENTRICULAR RATE: 115 BPM
EKG VENTRICULAR RATE: 68 BPM
EKG VENTRICULAR RATE: 75 BPM
EKG VENTRICULAR RATE: 80 BPM
EKG VENTRICULAR RATE: 81 BPM
EKG VENTRICULAR RATE: 99 BPM
EOSINOPHILS ABSOLUTE: 0.1 K/UL (ref 0–0.7)
EOSINOPHILS RELATIVE PERCENT: 1 %
FOLATE: 10.5 NG/ML (ref 7.3–26.1)
GFR AFRICAN AMERICAN: 23
GFR AFRICAN AMERICAN: 25.3
GFR NON-AFRICAN AMERICAN: 19
GFR NON-AFRICAN AMERICAN: 20.9
GLOBULIN: 2 G/DL (ref 2.3–3.5)
GLUCOSE BLD-MCNC: 104 MG/DL (ref 60–115)
GLUCOSE BLD-MCNC: 234 MG/DL (ref 60–115)
GLUCOSE BLD-MCNC: 247 MG/DL (ref 60–115)
GLUCOSE BLD-MCNC: 257 MG/DL (ref 60–115)
GLUCOSE BLD-MCNC: 314 MG/DL (ref 60–115)
GLUCOSE BLD-MCNC: 409 MG/DL (ref 60–115)
GLUCOSE BLD-MCNC: 439 MG/DL (ref 70–99)
GLUCOSE BLD-MCNC: 455 MG/DL (ref 60–115)
GLUCOSE BLD-MCNC: 512 MG/DL (ref 60–115)
GLUCOSE BLD-MCNC: 512 MG/DL (ref 60–115)
GLUCOSE BLD-MCNC: 69 MG/DL (ref 60–115)
HBA1C MFR BLD: 9.6 % (ref 4.8–5.9)
HCO3 ARTERIAL: 23 MMOL/L (ref 21–29)
HCT VFR BLD CALC: 27.1 % (ref 37–47)
HCT VFR BLD CALC: 27.3 % (ref 37–47)
HCT VFR BLD CALC: 27.3 % (ref 37–47)
HCT VFR BLD CALC: 28.6 % (ref 37–47)
HEMOGLOBIN: 8.2 G/DL (ref 12–16)
HEMOGLOBIN: 8.2 G/DL (ref 12–16)
HEMOGLOBIN: 8.3 G/DL (ref 12–16)
HEMOGLOBIN: 8.5 GM/DL (ref 12–16)
HEMOGLOBIN: 8.6 G/DL (ref 12–16)
HYPOCHROMIA: ABNORMAL
LACTATE: 0.64 MMOL/L (ref 0.4–2)
LYMPHOCYTES ABSOLUTE: 0.4 K/UL (ref 1–4.8)
LYMPHOCYTES RELATIVE PERCENT: 4 %
MACROCYTES: ABNORMAL
MAGNESIUM: 2.6 MG/DL (ref 1.7–2.4)
MCH RBC QN AUTO: 22 PG (ref 27–31.3)
MCH RBC QN AUTO: 22.4 PG (ref 27–31.3)
MCHC RBC AUTO-ENTMCNC: 30 % (ref 33–37)
MCHC RBC AUTO-ENTMCNC: 30.6 % (ref 33–37)
MCV RBC AUTO: 73.1 FL (ref 82–100)
MCV RBC AUTO: 73.4 FL (ref 82–100)
MICROCYTES: ABNORMAL
MONOCYTES ABSOLUTE: 0.1 K/UL (ref 0.2–0.8)
MONOCYTES RELATIVE PERCENT: 1 %
NEUTROPHILS ABSOLUTE: 10.3 K/UL (ref 1.4–6.5)
NEUTROPHILS RELATIVE PERCENT: 93 %
O2 SAT, ARTERIAL: 97 % (ref 93–100)
PCO2 ARTERIAL: 37 MM HG (ref 35–45)
PDW BLD-RTO: 24.6 % (ref 11.5–14.5)
PDW BLD-RTO: 24.7 % (ref 11.5–14.5)
PERFORMED ON: ABNORMAL
PERFORMED ON: NORMAL
PERFORMED ON: NORMAL
PH ARTERIAL: 7.4 (ref 7.35–7.45)
PLATELET # BLD: 396 K/UL (ref 130–400)
PLATELET # BLD: 412 K/UL (ref 130–400)
PLATELET SLIDE REVIEW: ABNORMAL
PO2 ARTERIAL: 94 MM HG (ref 75–108)
POC CHLORIDE: 108 MEQ/L (ref 99–110)
POC CREATININE: 2.5 MG/DL (ref 0.6–1.2)
POC FIO2: 5
POC HEMATOCRIT: 25 % (ref 36–48)
POC POTASSIUM: 3.9 MEQ/L (ref 3.5–5.1)
POC SAMPLE TYPE: ABNORMAL
POC SODIUM: 142 MEQ/L (ref 136–145)
POIKILOCYTES: ABNORMAL
POLYCHROMASIA: ABNORMAL
POTASSIUM SERPL-SCNC: 4.5 MEQ/L (ref 3.4–4.9)
RBC # BLD: 3.71 M/UL (ref 4.2–5.4)
RBC # BLD: 3.72 M/UL (ref 4.2–5.4)
SCHISTOCYTES: ABNORMAL
SODIUM BLD-SCNC: 136 MEQ/L (ref 135–144)
SPHEROCYTES: ABNORMAL
TCO2 ARTERIAL: 24 (ref 22–29)
TEAR DROP CELLS: ABNORMAL
TOTAL PROTEIN: 5.6 G/DL (ref 6.3–8)
TROPONIN: 0.07 NG/ML (ref 0–0.01)
TSH REFLEX: 0.78 UIU/ML (ref 0.44–3.86)
VITAMIN B-12: 1107 PG/ML (ref 232–1245)
WBC # BLD: 11.1 K/UL (ref 4.8–10.8)
WBC # BLD: 11.2 K/UL (ref 4.8–10.8)

## 2020-08-27 PROCEDURE — 2580000003 HC RX 258: Performed by: INTERNAL MEDICINE

## 2020-08-27 PROCEDURE — 6370000000 HC RX 637 (ALT 250 FOR IP): Performed by: INTERNAL MEDICINE

## 2020-08-27 PROCEDURE — 83036 HEMOGLOBIN GLYCOSYLATED A1C: CPT

## 2020-08-27 PROCEDURE — 36415 COLL VENOUS BLD VENIPUNCTURE: CPT

## 2020-08-27 PROCEDURE — 85014 HEMATOCRIT: CPT

## 2020-08-27 PROCEDURE — 99233 SBSQ HOSP IP/OBS HIGH 50: CPT | Performed by: INTERNAL MEDICINE

## 2020-08-27 PROCEDURE — 82607 VITAMIN B-12: CPT

## 2020-08-27 PROCEDURE — 6360000002 HC RX W HCPCS: Performed by: NURSE PRACTITIONER

## 2020-08-27 PROCEDURE — 36600 WITHDRAWAL OF ARTERIAL BLOOD: CPT

## 2020-08-27 PROCEDURE — 70551 MRI BRAIN STEM W/O DYE: CPT

## 2020-08-27 PROCEDURE — 83605 ASSAY OF LACTIC ACID: CPT

## 2020-08-27 PROCEDURE — 2580000003 HC RX 258: Performed by: PSYCHIATRY & NEUROLOGY

## 2020-08-27 PROCEDURE — APPSS30 APP SPLIT SHARED TIME 16-30 MINUTES: Performed by: NURSE PRACTITIONER

## 2020-08-27 PROCEDURE — 80053 COMPREHEN METABOLIC PANEL: CPT

## 2020-08-27 PROCEDURE — 94761 N-INVAS EAR/PLS OXIMETRY MLT: CPT

## 2020-08-27 PROCEDURE — 6370000000 HC RX 637 (ALT 250 FOR IP): Performed by: PSYCHIATRY & NEUROLOGY

## 2020-08-27 PROCEDURE — 93005 ELECTROCARDIOGRAM TRACING: CPT | Performed by: INTERNAL MEDICINE

## 2020-08-27 PROCEDURE — 85018 HEMOGLOBIN: CPT

## 2020-08-27 PROCEDURE — 82140 ASSAY OF AMMONIA: CPT

## 2020-08-27 PROCEDURE — 82435 ASSAY OF BLOOD CHLORIDE: CPT

## 2020-08-27 PROCEDURE — 85025 COMPLETE CBC W/AUTO DIFF WBC: CPT

## 2020-08-27 PROCEDURE — 2700000000 HC OXYGEN THERAPY PER DAY

## 2020-08-27 PROCEDURE — 2580000003 HC RX 258: Performed by: NURSE PRACTITIONER

## 2020-08-27 PROCEDURE — 84132 ASSAY OF SERUM POTASSIUM: CPT

## 2020-08-27 PROCEDURE — 82330 ASSAY OF CALCIUM: CPT

## 2020-08-27 PROCEDURE — 84295 ASSAY OF SERUM SODIUM: CPT

## 2020-08-27 PROCEDURE — 82565 ASSAY OF CREATININE: CPT

## 2020-08-27 PROCEDURE — 94640 AIRWAY INHALATION TREATMENT: CPT

## 2020-08-27 PROCEDURE — 83735 ASSAY OF MAGNESIUM: CPT

## 2020-08-27 PROCEDURE — 94762 N-INVAS EAR/PLS OXIMTRY CONT: CPT

## 2020-08-27 PROCEDURE — 82803 BLOOD GASES ANY COMBINATION: CPT

## 2020-08-27 PROCEDURE — 99222 1ST HOSP IP/OBS MODERATE 55: CPT | Performed by: INTERNAL MEDICINE

## 2020-08-27 PROCEDURE — 84443 ASSAY THYROID STIM HORMONE: CPT

## 2020-08-27 PROCEDURE — 84484 ASSAY OF TROPONIN QUANT: CPT

## 2020-08-27 PROCEDURE — 6360000002 HC RX W HCPCS: Performed by: INTERNAL MEDICINE

## 2020-08-27 PROCEDURE — 82746 ASSAY OF FOLIC ACID SERUM: CPT

## 2020-08-27 PROCEDURE — 99233 SBSQ HOSP IP/OBS HIGH 50: CPT | Performed by: PSYCHIATRY & NEUROLOGY

## 2020-08-27 PROCEDURE — C9113 INJ PANTOPRAZOLE SODIUM, VIA: HCPCS | Performed by: NURSE PRACTITIONER

## 2020-08-27 PROCEDURE — 85027 COMPLETE CBC AUTOMATED: CPT

## 2020-08-27 PROCEDURE — 1210000000 HC MED SURG R&B

## 2020-08-27 PROCEDURE — 72148 MRI LUMBAR SPINE W/O DYE: CPT

## 2020-08-27 RX ORDER — IPRATROPIUM BROMIDE AND ALBUTEROL SULFATE 2.5; .5 MG/3ML; MG/3ML
1 SOLUTION RESPIRATORY (INHALATION) ONCE
Status: COMPLETED | OUTPATIENT
Start: 2020-08-27 | End: 2020-08-27

## 2020-08-27 RX ORDER — PAROXETINE HYDROCHLORIDE 20 MG/1
40 TABLET, FILM COATED ORAL EVERY MORNING
Status: DISCONTINUED | OUTPATIENT
Start: 2020-08-27 | End: 2020-08-28 | Stop reason: HOSPADM

## 2020-08-27 RX ORDER — FUROSEMIDE 40 MG/1
40 TABLET ORAL DAILY
Status: DISCONTINUED | OUTPATIENT
Start: 2020-08-27 | End: 2020-08-28 | Stop reason: HOSPADM

## 2020-08-27 RX ORDER — LORAZEPAM 2 MG/ML
1 INJECTION INTRAMUSCULAR ONCE
Status: DISCONTINUED | OUTPATIENT
Start: 2020-08-27 | End: 2020-08-27

## 2020-08-27 RX ORDER — PANTOPRAZOLE SODIUM 40 MG/1
40 TABLET, DELAYED RELEASE ORAL
Status: DISCONTINUED | OUTPATIENT
Start: 2020-08-28 | End: 2020-08-28 | Stop reason: HOSPADM

## 2020-08-27 RX ORDER — QUETIAPINE FUMARATE 50 MG/1
50 TABLET, FILM COATED ORAL ONCE
Status: DISCONTINUED | OUTPATIENT
Start: 2020-08-27 | End: 2020-08-27

## 2020-08-27 RX ORDER — INSULIN GLARGINE 100 [IU]/ML
35 INJECTION, SOLUTION SUBCUTANEOUS NIGHTLY
Status: DISCONTINUED | OUTPATIENT
Start: 2020-08-27 | End: 2020-08-28

## 2020-08-27 RX ORDER — DIGOXIN 125 MCG
125 TABLET ORAL DAILY
Status: DISCONTINUED | OUTPATIENT
Start: 2020-08-27 | End: 2020-08-28 | Stop reason: HOSPADM

## 2020-08-27 RX ORDER — LORAZEPAM 2 MG/ML
1 INJECTION INTRAMUSCULAR ONCE
Status: COMPLETED | OUTPATIENT
Start: 2020-08-27 | End: 2020-08-27

## 2020-08-27 RX ADMIN — INSULIN HUMAN 20 UNITS: 100 INJECTION, SUSPENSION SUBCUTANEOUS at 11:42

## 2020-08-27 RX ADMIN — SODIUM CHLORIDE 200 MG: 9 INJECTION, SOLUTION INTRAVENOUS at 19:32

## 2020-08-27 RX ADMIN — PANTOPRAZOLE SODIUM 40 MG: 40 INJECTION, POWDER, FOR SOLUTION INTRAVENOUS at 19:32

## 2020-08-27 RX ADMIN — VERAPAMIL HYDROCHLORIDE 120 MG: 120 TABLET, FILM COATED, EXTENDED RELEASE ORAL at 23:19

## 2020-08-27 RX ADMIN — QUETIAPINE FUMARATE 25 MG: 25 TABLET ORAL at 23:17

## 2020-08-27 RX ADMIN — Medication 10 ML: at 19:36

## 2020-08-27 RX ADMIN — IPRATROPIUM BROMIDE AND ALBUTEROL SULFATE 1 AMPULE: .5; 3 SOLUTION RESPIRATORY (INHALATION) at 00:52

## 2020-08-27 RX ADMIN — Medication 10 ML: at 23:00

## 2020-08-27 RX ADMIN — Medication 10 ML: at 06:01

## 2020-08-27 RX ADMIN — Medication 10 ML: at 11:02

## 2020-08-27 RX ADMIN — HYDRALAZINE HYDROCHLORIDE 100 MG: 100 TABLET, FILM COATED ORAL at 23:16

## 2020-08-27 RX ADMIN — HYDRALAZINE HYDROCHLORIDE 100 MG: 100 TABLET, FILM COATED ORAL at 09:37

## 2020-08-27 RX ADMIN — CARVEDILOL 50 MG: 25 TABLET, FILM COATED ORAL at 09:36

## 2020-08-27 RX ADMIN — LORAZEPAM 1 MG: 2 INJECTION INTRAMUSCULAR; INTRAVENOUS at 16:44

## 2020-08-27 RX ADMIN — INSULIN LISPRO 12 UNITS: 100 INJECTION, SOLUTION INTRAVENOUS; SUBCUTANEOUS at 14:55

## 2020-08-27 RX ADMIN — CARVEDILOL 50 MG: 25 TABLET, FILM COATED ORAL at 23:17

## 2020-08-27 RX ADMIN — INSULIN LISPRO 8 UNITS: 100 INJECTION, SOLUTION INTRAVENOUS; SUBCUTANEOUS at 09:42

## 2020-08-27 RX ADMIN — PANTOPRAZOLE SODIUM 40 MG: 40 INJECTION, POWDER, FOR SOLUTION INTRAVENOUS at 06:01

## 2020-08-27 RX ADMIN — INSULIN LISPRO 30 UNITS: 100 INJECTION, SUSPENSION SUBCUTANEOUS at 09:43

## 2020-08-27 ASSESSMENT — ENCOUNTER SYMPTOMS
NAUSEA: 0
CHEST TIGHTNESS: 0
TROUBLE SWALLOWING: 0
GASTROINTESTINAL NEGATIVE: 1
EYES NEGATIVE: 1
STRIDOR: 0
COUGH: 0
SHORTNESS OF BREATH: 0
SHORTNESS OF BREATH: 1
COLOR CHANGE: 0
WHEEZING: 0
BLOOD IN STOOL: 0
VOMITING: 0
RESPIRATORY NEGATIVE: 1

## 2020-08-27 ASSESSMENT — PAIN SCALES - GENERAL: PAINLEVEL_OUTOF10: 0

## 2020-08-27 NOTE — PROGRESS NOTES
Assessment completed this shift. Alert and oriented to person and place at time of assessment. This nurse to room c/o SOB. Pt breathing hard similar to hyperventilating. Sweating and anxious.  at side. Vitals taken and stable. OR=486. [employer] to Dr Jeremy Craft 1100, call out at 117. Paged overhead [phone]. Order received for insulin. Given as ordered. Repeat  then 453. Notified Dr Jeremy Craft. Ativan given  Prior to MRI. Pt tolerated MRI well after Ativan. Returned to room and repositioned for comfort.   Electronically signed by Philippe Curry RN on 8/27/2020 at 7:29 PM

## 2020-08-27 NOTE — PROGRESS NOTES
syncope, facial asymmetry and speech difficulty. Psychiatric/Behavioral: Positive for agitation, behavioral problems, confusion and sleep disturbance. Negative for hallucinations. The patient is not nervous/anxious. Physical Exam  Vitals signs and nursing note reviewed. Constitutional:       General: She is not in acute distress. Appearance: She is obese. She is not diaphoretic. HENT:      Head: Normocephalic and atraumatic. Eyes:      Pupils: Pupils are equal, round, and reactive to light. Cardiovascular:      Rate and Rhythm: Normal rate and regular rhythm. Pulmonary:      Effort: Pulmonary effort is normal. No respiratory distress. Breath sounds: Normal breath sounds. Skin:     General: Skin is warm and dry. Neurological:      General: No focal deficit present. Mental Status: She is alert. She is disoriented. Cranial Nerves: No cranial nerve deficit. Motor: No weakness, tremor or seizure activity.                Medications:  Reviewed    Infusion Medications:    dextrose       Scheduled Medications:    furosemide  40 mg Oral Daily    digoxin  125 mcg Oral Daily    [START ON 8/28/2020] pantoprazole  40 mg Oral QAM AC    PARoxetine  40 mg Oral QAM    LORazepam  1 mg Intravenous Once    sodium chloride  20 mL Intravenous Once    iron sucrose  200 mg Intravenous Q24H    iron sucrose  200 mg Intravenous Once    QUEtiapine  25 mg Oral Nightly    sodium chloride  20 mL Intravenous Once    morphine  6 mg Intravenous Once    pantoprazole  40 mg Intravenous Q12H    And    sodium chloride (PF)  10 mL Intravenous Q12H    insulin lispro  0-12 Units Subcutaneous TID WC    insulin lispro  0-6 Units Subcutaneous Nightly    carvedilol  50 mg Oral BID    verapamil  120 mg Oral Nightly    rosuvastatin  40 mg Oral QPM    insulin lispro protamine & lispro  30 Units Subcutaneous BID WC    hydrALAZINE  100 mg Oral BID    sodium chloride flush  10 mL Intravenous 2 times skull were performed. FINDINGS: The ventricles are dilated. Unchanged size configuration. .  No mass. No midline shift. The cisterns are patent. There are white matter and periventricular changes most likely consistent with chronic small vessel disease. Area of low-attenuation the left basal ganglia. Unchanged. Likely old lacunar infarct. Area of old infarct within the right posterior occipital subcortical White matter. No acute intra-axial or extra-axial findings. The visualized osseous structures are unremarkable. The visualized portion of the paranasal sinuses, and mastoids are unremarkable. Impression NO ACUTE INTRA-AXIAL OR EXTRA-AXIAL FINDINGS. IF SIGNS OR SYMPTOMS PERSIST THEN RECOMMEND MRI TO FURTHER EVALUATE IF THERE ARE NO CONTRAINDICATIONS    All CT scans at this facility use dose modulation, iterative reconstruction, and/or weight based dosing when appropriate to reduce radiation dose to as low as reasonably achievable. CT HEAD WO CONTRAST, CT HEAD WO CONTRAST at 0349 hours. CLINICAL HISTORY: Confusion    COMPARISON: August 24, 2020 2141 hours    TECHNIQUE: Multiple unenhanced serial axial images of the brain from the vertex of the skull to the base of the skull were performed. FINDINGS: The ventricles are dilated. Unchanged size configuration. .  No mass. No midline shift. The cisterns are patent. There are white matter and periventricular changes most likely consistent with chronic small vessel disease. 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 Faint hypodensity seen in the right thalamus. Grossly unchanged as compared to prior studies dating back to July 7, 2019. Again note is made of old infarct within right occipital subcortical White matter. No acute intra-axial or extra-axial findings. The visualized osseous structures are unremarkable. The visualized portion of the paranasal sinuses, and mastoids are unremarkable.     IMPRESSION:    NO ACUTE INTRA-AXIAL shortness of breath. Patient had some speech issues which are very difficult to certain if this were a aphasia or not. Complete vascular profile will be obtained including a carotid ultrasound. Patient has anemia and is not on antiplatelet agents. She has had EGD as well as colonoscopy. At this time I have not recommended any antiplatelet agents unless we have a source of her bleeding. We will arrange for a carotid ultrasound and see if she has any other risk factors for cerebrovascular disease. Patient is noted examination at this time appears to be normal.  Since last seen patient was transferred to the regular medical floor she did not sleep all night appears to be very angry at this time. She though remains oriented to self place month and year. She told me to leave her alone as she wanted to sleep and she ate later. This findings do not suggest an encephalopathy. This may be Suggestion of sleep deprivation and patient should be allowed to rest for now before we can come to some other     8/27/20:  Carotid duplex with right ICA velocities of 70% and left ICA 50 to 69%  Dr. Yaneth Thompson spoke with patient's family who reported that her memory fluctuates at home. Patient may have the beginnings of underlying vascular dementia. We will address this further on outpatient basis  Will obtain MRI of brain given patient's continued confusion and risk factors for CVD  Will assess ABGs, TSH, B12, ammonia level  Assess nocturnal pulse oximetry   collaborating physicians: Dr Yaneth Thompson    I independently performed an evaluation on this patient. I have reviewed the above documentation completed by the Nurse Practitioner. Please see my additional contributions to the HPI, physical exam, assessment/medical decision making. Mehul Thompson MD, [address], American Board of Psychiatry & Neurology  Board Certified in Vascular Neurology  Board Certified in Neuromuscular Medicine  Certified in . [place]  Electronically signed by JOHNATHON Toure CNP on 8/27/2020 at 2:22 PM

## 2020-08-27 NOTE — PROGRESS NOTES
Hematology/Oncology   Progress Note        CHIEF COMPLAINT/HPI:  Follow up of anemia. Hemoglobin at 8.3 today. Had one dose of venofer yesterday and another one for today. No melena. REVIEW OF SYSTEMS:    Unremarkable except for symptoms mentioned in HPI.     Current Inpatient Medications:    Current Facility-Administered Medications   Medication Dose Route Frequency Provider Last Rate Last Dose    0.9 % sodium chloride bolus  20 mL Intravenous Once Nishant Meraz MD        iron sucrose (VENOFER) 200 mg in sodium chloride 0.9 % 100 mL IVPB  200 mg Intravenous Q24H Nishant Meraz MD   Stopped at 08/26/20 1211    iron sucrose (VENOFER) 200 mg in sodium chloride 0.9 % 100 mL IVPB  200 mg Intravenous Once Rick Garrido MD        QUEtiapine (SEROQUEL) tablet 25 mg  25 mg Oral Nightly Leann Espana MD   25 mg at 08/26/20 2214    0.9 % sodium chloride bolus  20 mL Intravenous Once Amira Chaves PARUTH        morphine (PF) injection 6 mg  6 mg Intravenous Once Broderick Nava        sodium chloride flush 0.9 % injection 10 mL  10 mL Intravenous 2 times per day Cheryl Chowdary RN, NP   10 mL at 08/25/20 2156    sodium chloride flush 0.9 % injection 10 mL  10 mL Intravenous PRN Cheryl Chowdary RN, NP        acetaminophen (TYLENOL) tablet 650 mg  650 mg Oral Q6H PRN Cheryl Chowdary RN, NP        Or    acetaminophen (TYLENOL) suppository 650 mg  650 mg Rectal Q6H PRN Cheryl Chowdary RN, NP        promethazine (PHENERGAN) tablet 12.5 mg  12.5 mg Oral Q6H PRN Cheryl Chowdary RN, NP        Or    ondansetron (ZOFRAN) injection 4 mg  4 mg Intravenous Q6H PRN Cheryl Chowdary RN, NP        pantoprazole (PROTONIX) injection 40 mg  40 mg Intravenous Q12H Cheryl Chowdary RN, NP   40 mg at 08/27/20 0601    And    sodium chloride (PF) 0.9 % injection 10 mL  10 mL Intravenous Q12H Cheryl Chowdary RN, NP   10 mL at 08/27/20 0601    insulin lispro (HUMALOG) injection vial 0-12 Units  0-12 Units Subcutaneous TID  Tuyet Gonzalez MD   8 Units at 08/27/20 0942    insulin lispro (HUMALOG) injection vial 0-6 Units  0-6 Units Subcutaneous Nightly Tuyet Gonzalez MD   6 Units at 08/26/20 2207    glucose (GLUTOSE) 40 % oral gel 15 g  15 g Oral PRN Tuyet Gonzalez MD        dextrose 50 % IV solution  12.5 g Intravenous PRN Tuyet Gonzalez MD        glucagon (rDNA) injection 1 mg  1 mg Intramuscular PRN Tuyet Gonzalez MD        dextrose 5 % solution  100 mL/hr Intravenous PRN Tuyet Gonzalez MD        carvedilol (COREG) tablet 50 mg  50 mg Oral BID Teresa Florez MD   50 mg at 08/27/20 6136    verapamil (CALAN SR) extended release tablet 120 mg  120 mg Oral Nightly Teresa Florez MD   120 mg at 08/26/20 2320    rosuvastatin (CRESTOR) tablet 40 mg  40 mg Oral QPM Teresa Florez MD   40 mg at 08/26/20 1818    insulin lispro protamine & lispro (HUMALOG MIX) (75-25) 100 UNIT per ML injection vial SUSP 30 Units  30 Units Subcutaneous BID  Teresa Florez MD   30 Units at 08/27/20 7883    hydrALAZINE (APRESOLINE) tablet 100 mg  100 mg Oral BID Teresa Florez MD   100 mg at 08/27/20 1737    sodium chloride flush 0.9 % injection 10 mL  10 mL Intravenous 2 times per day Laverne Gibson MD   10 mL at 08/27/20 1102    sodium chloride flush 0.9 % injection 10 mL  10 mL Intravenous PRN Laverne Gibson MD        0.9 % sodium chloride bolus  20 mL Intravenous Once Larisa Vasquez PA-C           PHYSICAL EXAM:    EYES:  Lids and lashes normal, pupils equal, round and reactive to light, extra ocular muscles intact, sclera clear, conjunctiva normal    ENT:  Normocephalic, without obvious abnormality, atraumatic, sinuses nontender on palpation, external ears without lesions, oral pharynx with moist mucus membranes, tonsils without erythema or exudates, gums normal and good dentition.     NECK:  Supple, symmetrical, trachea midline, no adenopathy, thyroid symmetric, not enlarged and no tenderness, skin normal    CHEST:    LUNGS:  No increased work of breathing, good air exchange, clear to auscultation bilaterally, no crackles or wheezing    CARDIOVASCULAR:  Normal apical impulse, regular rate and rhythm, normal S1 and S2, no S3 or S4, and no murmur noted    ABDOMEN:  No scars, normal bowel sounds, soft, non-distended, non-tender, no masses palpated, no hepatosplenomegally    MUSCULOSKELETAL:  There is no redness, warmth, or swelling of the joints. Full range of motion noted. Motor strength is 5 out of 5 all extremities bilaterally. Tone is normal.  EXTREMITIES:    NEURO:    DATA:      PT/INR:  No results found for: PTINR  PTT:    Lab Results   Component Value Date    APTT 27.8 08/25/2020     CMP:    Lab Results   Component Value Date     08/27/2020    K 4.5 08/27/2020    K 4.6 07/03/2020     08/27/2020    CO2 21 08/27/2020    BUN 71 08/27/2020    PROT 5.6 08/27/2020     Magnesium:    Lab Results   Component Value Date    MG 2.6 08/27/2020     Phosphorus:  No components found for: PO4  Calcium:  No components found for: CA  CBC:    Lab Results   Component Value Date    WBC 11.1 08/27/2020    RBC 3.71 08/27/2020    HGB 8.3 08/27/2020    HCT 27.1 08/27/2020    MCV 73.1 08/27/2020    RDW 24.7 08/27/2020     08/27/2020     DIFF:    Lab Results   Component Value Date    MCV 73.1 08/27/2020    RDW 24.7 08/27/2020      LDH:    Lab Results   Component Value Date     08/26/2020     Uric Acid:  No components found for: URIC    EKG Reviewed  Appropriate Radiology Reviewed      Pathology: Reviewed where indicated      ASSESSMENT:  Principal Problem:    Symptomatic anemia  Active Problems:    Uncontrolled type 2 diabetes mellitus with hyperglycemia (HCC)    Flash pulmonary edema (HCC)    Acute on chronic kidney failure (HCC)    Dysarthria    Chronic fatigue  Resolved Problems:    * No resolved hospital problems.  *    Patient Active Problem List   Diagnosis    Hypertension    Hyperlipidemia    Uncontrolled type 2 diabetes mellitus with complication, without long-term current use of insulin (HCC)    Fibromyalgia    Anxiety    Smoker    Insomnia    DJD (degenerative joint disease), lumbar    Lumbosacral radiculopathy at S1    DDD (degenerative disc disease), lumbar    Dysphonia    CTS (carpal tunnel syndrome)    High risk medication use-Norco - 12/20/17 OARRS PM&R, 02/20/18 OARRS PM&R, 03/07/18 OARRS PM&R, Urine Drug screen negative 02/06/17 PM&R--MED CONTRACT 2/6/17    SOB (shortness of breath) on exertion    Chest pain    Memory deficit    Artificial lens present    Presbyopia    Astigmatism, regular    Cataract, nuclear sclerotic senile    IDDM (insulin dependent diabetes mellitus) ([address]    Regular astigmatism    Nuclear senile cataract    Neck pain    Lumbosacral radiculopathy at L5    Spinal stenosis of lumbar region with neurogenic claudication    Impaired mobility and activities of daily living    Non-compliant patient NO showed FU 1/10/17 Dr Christian Sanchez Insomnia secondary to chronic pain    Reactive depression    Diabetic radiculopathy (HCC)    Cervical radicular pain    Diabetic asymmetric polyneuropathy (HCC)    Myalgia    Intercostal neuropathy    Osteoarthritis of spine with radiculopathy, lumbar region    Acute combined systolic and diastolic CHF, NYHA class 1 ([facility])    PMB (postmenopausal bleeding)    Acute on chronic diastolic heart failure (HCC)    CHF (congestive heart failure) ([facility])    Hypertensive urgency    Uncontrolled type 2 diabetes mellitus with hyperglycemia (HCC)    Chronic obstructive pulmonary disease with acute exacerbation (HCC)    Acute on chronic diastolic (congestive) heart failure ([address]    SAÚL (acute kidney injury) ([address]    Hypoglycemia    HOCM (hypertrophic obstructive cardiomyopathy) ([facility])    Gastrointestinal hemorrhage    COPD exacerbation (HCC)    Anemia    AVM (arteriovenous malformation)

## 2020-08-27 NOTE — PROGRESS NOTES
Attempted MRI of lumbar spine without success.   Patient ripped of her mask and refused to have the exam.

## 2020-08-27 NOTE — PROGRESS NOTES
Called regarding patient diaphoresis. Stable on room air, BP (!) 110/91   Pulse 82   Temp 97.3 °F (36.3 °C) (Oral)   Resp 24   Ht 4' 11\" (1.499 m)   Wt 130 lb (59 kg)   LMP  (LMP Unknown)   SpO2 100%   BMI 26.26 kg/m²   Will reassess patient shortly. Insulin orders given, endocrinology consulted.

## 2020-08-27 NOTE — PROGRESS NOTES
Progress Note  Patient: Laurie Life  Unit/Bed: K521/E850-89  YOB: 1945  MRN: [id number]  Acct: [de-identified]   Admitting Diagnosis: Symptomatic anemia [D64.9]  Symptomatic anemia [D64.9]  Admit Date:  8/24/2020  Hospital Day: 2    Chief Complaint: anemia MS changes    Histories:  Past Medical History:   Diagnosis Date    Anxiety     Asthma     dx 2019 / has smoked since age 14    CH[facility]congestive [facility] failure) [facility]     Chronic back pain     Bilateral L5 S1 Radic on emg--surprisingly worse on the left than the right--pt's symptoms and her MRI show worse on the right    Chronic obstructive pulmonary disease with acute exacerbation ([facility] 10/12/2019    Depression     Fibromyalgia     Hyperlipidemia     meds > 8 yrs    Hypertension     meds > 45 yrs    On home O2     2l per n/c at bedtime mostly,     Osteoarthritis     Type II diabetes mellitus, uncontrolled ([address]     hx > 8 yrs    Unspecified sleep apnea      Past Surgical History:   Procedure Laterality Date    BACK SURGERY  2017    lumbar disc    CARDIAC CATHETERIZATION  11/3/14     DR. MIRELES / no stents    COLONOSCOPY  08/29/2016    w/polypectomy     DILATION AND CURETTAGE OF UTERUS N/A 10/7/2019    EUA HYSTEROSCOPY DILATATION AND CURETTAGE performed by Chetna Carrasco DO at [facility], DIAGNOSTIC      EYE SURGERY      [place] with IOL OU / [address] [place]  [address]    umbilical hernia repair    RI ESOPHAGOGASTRODUODENOSCOPY TRANSORAL DIAGNOSTIC N/A 3/24/2017    EGD ESOPHAGOGASTRODUODENOSCOPY performed by Momo Vincent MD at [facility] N/A 2/8/2018    negative findings    RI REVISE MEDIAN N/CARPAL TUNNEL SURG Left 6/5/2017    LEFT  CARPAL TUNNEL RELEASE performed by Dany Bell MD at [facility],8+C/B,[facility] N/A 2/8/2018    TONSILLECTOMY      as child    UPPER GASTROINTESTINAL ENDOSCOPY  08/26/2016    w/bx     UPPER GASTROINTESTINAL ENDOSCOPY N/A 2020    EGD possible biopsy performed by Madan Holcomb MD at [address] [facility] N/A 2020    EGD PUSH ENTEROSCOPY performed by Lars Landeros MD at [facility]     Family History   Problem Relation Age of Onset    Heart Disease Father         cardiac bypass    Arthritis Father     Arthritis Mother     Other Mother          at age 80    Other [facility]         [facility]    No Known Problems Daughter     Stroke Son      Social History     Socioeconomic History    Marital status:      Spouse name: None    Number of children: None    Years of education: None    Highest education level: None   Occupational History    Occupation: Retired-   Social Needs    Financial resource strain: None    Food insecurity     Worry: None     Inability: None    Transportation needs     Medical: None     Non-medical: None   Tobacco Use    Smoking status: Former Smoker     Packs/day: 1.00     Years: 59.00     Pack years: 59.00     Types: Cigarettes     Start date: 2017    Smokeless tobacco: Never Used    Tobacco comment: quit 2-3 weeks ago    Substance and Sexual Activity    Alcohol use: Not Currently    Drug use: No    Sexual activity: Yes   Lifestyle    Physical activity     Days per week: None     Minutes per session: None    Stress: None   Relationships    Social connections     Talks on phone: None     Gets together: None     Attends Taoist service: None     Active member of club or organization: None     Attends meetings of clubs or organizations: None     Relationship status: None    Intimate partner violence     Fear of current or ex partner: None     Emotionally abused: None     Physically abused: None     Forced sexual activity: None   Other Topics Concern    None   Social History Narrative    None       Subjective/HPI   She is very upset this am. States staff won't give her what she needs. ie orange juice. But her glc is > 300. She does not want to converse much this am. This is unusual behavior for her. I have taken care of her on many occasions in the past.     EKG:        Review of Systems:   Review of Systems   Constitutional: Negative. Negative for diaphoresis and fatigue. HENT: Negative. Eyes: Negative. Respiratory: Negative. Negative for cough, chest tightness, shortness of breath, wheezing and stridor. Cardiovascular: Negative. Negative for chest pain, palpitations and leg swelling. Gastrointestinal: Negative. Negative for blood in stool and nausea. Genitourinary: Negative. Musculoskeletal: Negative. Skin: Negative. Neurological: Positive for weakness. Negative for dizziness, syncope and light-headedness. Hematological: Negative. Psychiatric/Behavioral: Negative. Physical Examination:    BP (!) 112/50   Pulse 68   Temp 97.3 °F (36.3 °C) (Oral)   Resp 17   Ht 4' 11\" (1.499 m)   Wt 130 lb (59 kg)   LMP  (LMP Unknown)   SpO2 100%   BMI 26.26 kg/m²    Physical Exam   Constitutional: She appears healthy. No distress. HENT:   Normal cephalic and Atraumatic   Eyes: Pupils are equal, round, and reactive to light. Neck: Normal range of motion and thyroid normal. Neck supple. No JVD present. No neck adenopathy. No thyromegaly present. Cardiovascular: Normal rate, regular rhythm, intact distal pulses and normal pulses. Murmur heard. Pulmonary/Chest: Effort normal and breath sounds normal. She has no wheezes. She has no rales. She exhibits no tenderness. Abdominal: Soft. Bowel sounds are normal. There is no abdominal tenderness. Musculoskeletal: Normal range of motion. General: No tenderness or edema. Neurological: She is alert and oriented to person, place, and time. Skin: Skin is warm. No cyanosis. Nails show no clubbing.        LABS:  CBC:   Lab Results   Component Value Date    WBC 12.8 08/26/2020    RBC 3.16 08/26/2020    HGB 8.2 08/26/2020    HCT 28.0 08/26/2020    MCV 69.9 08/26/2020    MCH 20.6 08/26/2020    MCHC 29.5 08/26/2020    RDW 23.6 08/26/2020     08/26/2020    MPV 8.8 08/01/2015     CBC with Differential:    Lab Results   Component Value Date    WBC 12.8 08/26/2020    RBC 3.16 08/26/2020    HGB 8.2 08/26/2020    HCT 28.0 08/26/2020     08/26/2020    MCV 69.9 08/26/2020    MCH 20.6 08/26/2020    MCHC 29.5 08/26/2020    RDW 23.6 08/26/2020    NRBC 1 06/30/2020    LYMPHOPCT 6.1 08/26/2020    MONOPCT 3.4 08/26/2020    BASOPCT 0.1 08/26/2020    MONOSABS 0.4 08/26/2020    LYMPHSABS 0.8 08/26/2020    EOSABS 0.0 08/26/2020    BASOSABS 0.0 08/26/2020     CMP:    Lab Results   Component Value Date     08/26/2020    K 4.4 08/26/2020    K 4.6 07/03/2020     08/26/2020    CO2 17 08/26/2020    BUN 54 08/26/2020    CREATININE 1.83 08/26/2020    GFRAA 32.5 08/26/2020    LABGLOM 26.8 08/26/2020    GLUCOSE 325 08/26/2020    PROT 6.1 08/26/2020    LABALBU 3.7 08/26/2020    CALCIUM 9.3 08/26/2020    BILITOT 0.3 08/26/2020    ALKPHOS 93 08/26/2020    AST 23 08/26/2020    ALT 17 08/26/2020     BMP:    Lab Results   Component Value Date     08/26/2020    K 4.4 08/26/2020    K 4.6 07/03/2020     08/26/2020    CO2 17 08/26/2020    BUN 54 08/26/2020    LABALBU 3.7 08/26/2020    CREATININE 1.83 08/26/2020    CALCIUM 9.3 08/26/2020    GFRAA 32.5 08/26/2020    LABGLOM 26.8 08/26/2020    GLUCOSE 325 08/26/2020     Magnesium:    Lab Results   Component Value Date    MG 2.5 08/26/2020     Troponin:    Lab Results   Component Value Date    TROPONINI 0.019 08/25/2020        Active Hospital Problems    Diagnosis Date Noted    Flash pulmonary edema ([facility] [J81.0] 08/25/2020     Priority: Low    Acute on chronic kidney failure ([facility] [N17.9, N18.9] 08/25/2020     Priority: Low    Dysarthria [R47.1]      Priority: Low    Chronic fatigue [R53.82]      Priority: Low    Symptomatic anemia [D64.9] 08/24/2020

## 2020-08-27 NOTE — PROGRESS NOTES
Hospitalist Progress Note      Date of Admission: 8/24/2020  Chief Complaint:    Chief Complaint   Patient presents with    Flank Pain     right     Subjective:  No new complaints.   No nausea, vomiting, chest pain, or headache    Medications:    Infusion Medications    dextrose       Scheduled Medications    furosemide  40 mg Oral Daily    digoxin  125 mcg Oral Daily    [START ON 8/28/2020] pantoprazole  40 mg Oral QAM AC    PARoxetine  40 mg Oral QAM    LORazepam  1 mg Intravenous Once    sodium chloride  20 mL Intravenous Once    iron sucrose  200 mg Intravenous Q24H    iron sucrose  200 mg Intravenous Once    QUEtiapine  25 mg Oral Nightly    sodium chloride  20 mL Intravenous Once    morphine  6 mg Intravenous Once    pantoprazole  40 mg Intravenous Q12H    And    sodium chloride (PF)  10 mL Intravenous Q12H    insulin lispro  0-12 Units Subcutaneous TID WC    insulin lispro  0-6 Units Subcutaneous Nightly    carvedilol  50 mg Oral BID    verapamil  120 mg Oral Nightly    rosuvastatin  40 mg Oral QPM    insulin lispro protamine & lispro  30 Units Subcutaneous BID WC    hydrALAZINE  100 mg Oral BID    sodium chloride flush  10 mL Intravenous 2 times per day    sodium chloride  20 mL Intravenous Once     PRN Meds: acetaminophen **OR** acetaminophen, promethazine **OR** ondansetron, glucose, dextrose, glucagon (rDNA), dextrose, sodium chloride flush    Intake/Output Summary (Last 24 hours) at 8/27/2020 1429  Last data filed at 8/27/2020 0901  Gross per 24 hour   Intake 720 ml   Output 300 ml   Net 420 ml     Exam:  BP (!) 110/91   Pulse 82   Temp 97.3 °F (36.3 °C) (Oral)   Resp 24   Ht 4' 11\" (1.499 m)   Wt 130 lb (59 kg)   LMP  (LMP Unknown)   SpO2 100%   BMI 26.26 kg/m²   Head: Normocephalic, atraumatic  Sclera clear  Neck: Trachea midline  Lungs: Symmetrical expansion    Labs:   Recent Labs     08/26/20  0727  08/26/20  2332 08/27/20  0929 08/27/20  1156   WBC 12.8*  --   -- 11.1* 11.2*   HGB 6.5*   < > 8.2* 8.3* 8.2*   HCT 22.1*   < > 28.0* 27.1* 27.3*   *  --   --  412* 396    < > = values in this interval not displayed. Recent Labs     08/25/20  1559 08/26/20  0727 08/27/20  0929   * 138 136   K 4.8 4.4 4.5   CL 97 104 106   CO2 17* 17* 21   BUN 45* 54* 71*   CREATININE 1.94* 1.83* 2.27*   CALCIUM 9.4 9.3 9.1   AST 20 23 30   ALT 18 17 20   BILITOT 0.3 0.3 <0.2   ALKPHOS 118 93 120     Recent Labs     08/25/20  0245   INR 1.0     Recent Labs     08/25/20  1135 08/25/20  1559 08/27/20  1156   TROPONINI 0.013* 0.019* 0.072*     Radiology:  RADIOLOGY REPORT   Final Result      US CAROTID ARTERY BILATERAL   Final Result   SCATTERED ATHEROSCLEROSIS AND DIFFUSE INTIMAL THICKENING NOTED BILATERAL ICA AND COMMON CAROTIDS. BILATERAL ICAS ARE VERY TORTUOUS. BILATERAL ANTEGRADE VERTEBRAL FLOW      RIGHT ICA BY VELOCITIES APPROXIMATELY 70% HOWEVER THIS MAY BE OVERESTIMATED DUE TO THE TORTUOSITY OF THE ICA. LEFT ICA 50-69% STENOSIS. CT HEAD WO CONTRAST   Final Result      NO ACUTE INTRA-AXIAL OR EXTRA-AXIAL FINDINGS. IF SIGNS OR SYMPTOMS PERSIST THEN RECOMMEND MRI TO FURTHER EVALUATE IF THERE ARE NO CONTRAINDICATIONS      All CT scans at this facility use dose modulation, iterative reconstruction, and/or weight based dosing when appropriate to reduce radiation dose to as low as reasonably achievable. CT HEAD WO CONTRAST, CT HEAD WO CONTRAST at 0349 hours. CLINICAL HISTORY: Confusion      COMPARISON: August 24, 2020 2141 hours      TECHNIQUE: Multiple unenhanced serial axial images of the brain from the vertex of the skull to the base of the skull were performed. FINDINGS: The ventricles are dilated. Unchanged size configuration. .  No mass. No midline shift. The cisterns are patent. There are white matter and periventricular changes most likely consistent with chronic small vessel disease. 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 Faint hypodensity seen in the right thalamus. Grossly unchanged as compared to prior studies dating back to July 7, 2019. Again note is made of old infarct within right occipital subcortical White matter. No acute intra-axial or extra-axial findings. The visualized osseous structures are unremarkable. The visualized portion of the paranasal sinuses, and mastoids are unremarkable. IMPRESSION:      NO ACUTE INTRA-AXIAL OR EXTRA-AXIAL FINDINGS. IF SIGNS OR SYMPTOMS PERSIST THEN RECOMMEND MRI TO FURTHER EVALUATE IF THERE ARE NO CONTRAINDICATIONS      All CT scans at this facility use dose modulation, iterative reconstruction, and/or weight based dosing when appropriate to reduce radiation dose to as low as reasonably achievable. CT CHEST WO CONTRAST   Final Result   Unremarkable CT scan the chest as described above         All CT scans at this facility use dose modulation, iterative reconstruction, and/or weight based dosing when appropriate to reduce radiation dose to as low as reasonably achievable. Examination: CT ABDOMEN PELVIS WO CONTRAST, CT CHEST WO CONTRAST      Indication:   flank pain       Technique: Multiple serial axial images was performed through the abdomen and pelvis without intravenous or oral administration of contrast..   Images were reconstructed in the axial and coronal and sagittal planes. Comparison: September 9, 2019      Findings: The liver, gallbladder, spleen, pancreas, adrenals,  are unremarkable. The kidneys show no significant perinephric stranding. No nephrolithiasis. No hydronephrosis or hydroureter. No bladder calculi. Large and small bowel show no sign of obstruction. The appendix is not visualized. No pericecal stranding. No diverticulitis. No free air. No free fluid. The visualized abdominal aorta is of normal size and caliber. No significant retroperitoneal adenopathy.  There is multilevel degenerative changes of lumbar spine. There is a grade 1 anterolisthesis of L4 and L5. There is narrowing of the L5-S1 disc space. Impression: UNREMARKABLE CT SCAN OF THE ABDOMEN AND PELVIS AS DESCRIBED ABOVE         All CT scans at this facility use dose modulation, iterative reconstruction, and/or weight based dosing when appropriate to reduce radiation dose to as low as reasonably achievable. XR CHEST PORTABLE   Final Result   BORDERLINE CARDIOMEGALY WITH INTERSTITIAL EDEMA. XR CHEST (2 VW)   Final Result   PULMONARY VASCULAR CONGESTION WITH INCREASED INTERSTITIAL MARKINGS. RADIOGRAPHIC FINDINGS COULD SUGGEST EARLY CHF. CHF. CORRELATE CLINICALLY      CT Head WO Contrast   Final Result      NO ACUTE INTRA-AXIAL OR EXTRA-AXIAL FINDINGS. IF SIGNS OR SYMPTOMS PERSIST THEN RECOMMEND MRI TO FURTHER EVALUATE IF THERE ARE NO CONTRAINDICATIONS      All CT scans at this facility use dose modulation, iterative reconstruction, and/or weight based dosing when appropriate to reduce radiation dose to as low as reasonably achievable. CT HEAD WO CONTRAST, CT HEAD WO CONTRAST at 0349 hours. CLINICAL HISTORY: Confusion      COMPARISON: August 24, 2020 2141 hours      TECHNIQUE: Multiple unenhanced serial axial images of the brain from the vertex of the skull to the base of the skull were performed. FINDINGS: The ventricles are dilated. Unchanged size configuration. .  No mass. No midline shift. The cisterns are patent. There are white matter and periventricular changes most likely consistent with chronic small vessel disease. 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 Faint hypodensity seen in the right thalamus. Grossly unchanged as compared to prior studies dating back to July 7, 2019. Again note is made of old infarct within right occipital subcortical White matter. No acute intra-axial or extra-axial findings. The visualized osseous structures are unremarkable.       The visualized portion of the paranasal sinuses, and mastoids are unremarkable. IMPRESSION:      NO ACUTE INTRA-AXIAL OR EXTRA-AXIAL FINDINGS. IF SIGNS OR SYMPTOMS PERSIST THEN RECOMMEND MRI TO FURTHER EVALUATE IF THERE ARE NO CONTRAINDICATIONS      All CT scans at this facility use dose modulation, iterative reconstruction, and/or weight based dosing when appropriate to reduce radiation dose to as low as reasonably achievable. CT ABDOMEN PELVIS WO CONTRAST Additional Contrast? None   Final Result   Unremarkable CT scan the chest as described above         All CT scans at this facility use dose modulation, iterative reconstruction, and/or weight based dosing when appropriate to reduce radiation dose to as low as reasonably achievable. Examination: CT ABDOMEN PELVIS WO CONTRAST, CT CHEST WO CONTRAST      Indication:   flank pain       Technique: Multiple serial axial images was performed through the abdomen and pelvis without intravenous or oral administration of contrast..   Images were reconstructed in the axial and coronal and sagittal planes. Comparison: September 9, 2019      Findings: The liver, gallbladder, spleen, pancreas, adrenals,  are unremarkable. The kidneys show no significant perinephric stranding. No nephrolithiasis. No hydronephrosis or hydroureter. No bladder calculi. Large and small bowel show no sign of obstruction. The appendix is not visualized. No pericecal stranding. No diverticulitis. No free air. No free fluid. The visualized abdominal aorta is of normal size and caliber. No significant retroperitoneal adenopathy. There is multilevel degenerative changes of lumbar spine. There is a grade 1 anterolisthesis of L4 and L5. There is narrowing of the L5-S1 disc space.       Impression: UNREMARKABLE CT SCAN OF THE ABDOMEN AND PELVIS AS DESCRIBED ABOVE         All CT scans at this facility use dose modulation, iterative reconstruction,

## 2020-08-27 NOTE — FLOWSHEET NOTE
2215 Shift assessment complete. PT is resting in bed. She doesn't want to be bothered. Her BS was 411, 450, 436. I medicated with 6 units of humalog. Messaged Sean he said to give 10 units of insulin regular subque. I will continue to monitor her sugar. Pt wanted orange juice, I told her because of her sugar she could not have oj, she got very upset. She settled for a diet ginger ale. Call light within reach. Will continue to monitor. 0025 Anne-Marie RN heard the avasys say something and went to check. The pt looked SOB, she put 2l of NC and checked pulse ox it was 100% on RA. However, pt was wheezy so we left O2 on. I messaged Sean because pt said she also felt like she was having slight chest pains. EKG was done. I checked her sugar it had come down to 385. Sean ordered a duoneb. She is now sleeping in bed. Call light within reach. Will continue to monitor. 0610 Pt slept throughout the rest of the night. Will continue to monitor.     Electronically signed by Nava Bellamy RN on 8/27/2020 at 6:10 AM

## 2020-08-28 VITALS
HEART RATE: 77 BPM | TEMPERATURE: 97.3 F | SYSTOLIC BLOOD PRESSURE: 136 MMHG | OXYGEN SATURATION: 93 % | WEIGHT: 130 LBS | BODY MASS INDEX: 26.21 KG/M2 | DIASTOLIC BLOOD PRESSURE: 63 MMHG | RESPIRATION RATE: 20 BRPM | HEIGHT: 59 IN

## 2020-08-28 LAB
ALBUMIN SERPL-MCNC: 3.5 G/DL (ref 3.5–4.6)
ALP BLD-CCNC: 125 U/L (ref 40–130)
ALT SERPL-CCNC: 60 U/L (ref 0–33)
ANION GAP SERPL CALCULATED.3IONS-SCNC: 13 MEQ/L (ref 9–15)
ANISOCYTOSIS: ABNORMAL
AST SERPL-CCNC: 42 U/L (ref 0–35)
BASOPHILS ABSOLUTE: 0 K/UL (ref 0–0.2)
BASOPHILS RELATIVE PERCENT: 0.5 %
BILIRUB SERPL-MCNC: 0.3 MG/DL (ref 0.2–0.7)
BUN BLDV-MCNC: 57 MG/DL (ref 8–23)
CALCIUM SERPL-MCNC: 9.5 MG/DL (ref 8.5–9.9)
CHLORIDE BLD-SCNC: 111 MEQ/L (ref 95–107)
CO2: 20 MEQ/L (ref 20–31)
CREAT SERPL-MCNC: 1.69 MG/DL (ref 0.5–0.9)
EOSINOPHILS ABSOLUTE: 0 K/UL (ref 0–0.7)
EOSINOPHILS RELATIVE PERCENT: 1.3 %
FIBRINOGEN ANTIGEN: 361 MG/DL (ref 149–353)
FIBRINOGEN RATIO: 0.92 RATIO (ref 0.59–1.23)
FIBRINOGEN: 391 MG/DL (ref 150–430)
GFR AFRICAN AMERICAN: 35.6
GFR NON-AFRICAN AMERICAN: 29.4
GLOBULIN: 2.5 G/DL (ref 2.3–3.5)
GLUCOSE BLD-MCNC: 128 MG/DL (ref 60–115)
GLUCOSE BLD-MCNC: 165 MG/DL (ref 60–115)
GLUCOSE BLD-MCNC: 176 MG/DL (ref 70–99)
GLUCOSE BLD-MCNC: 203 MG/DL (ref 60–115)
GLUCOSE BLD-MCNC: 272 MG/DL (ref 60–115)
HCT VFR BLD CALC: 26.8 % (ref 37–47)
HCT VFR BLD CALC: 27 % (ref 37–47)
HEMATOLOGY PATH CONSULT: NO
HEMOGLOBIN: 8.1 G/DL (ref 12–16)
HEMOGLOBIN: 8.4 G/DL (ref 12–16)
HYPOCHROMIA: ABNORMAL
LYMPHOCYTES ABSOLUTE: 0.1 K/UL (ref 1–4.8)
LYMPHOCYTES RELATIVE PERCENT: 1 %
MAGNESIUM: 2.6 MG/DL (ref 1.7–2.4)
MCH RBC QN AUTO: 22.6 PG (ref 27–31.3)
MCHC RBC AUTO-ENTMCNC: 31.3 % (ref 33–37)
MCV RBC AUTO: 72.3 FL (ref 82–100)
MICROCYTES: ABNORMAL
MONOCYTES ABSOLUTE: 0.7 K/UL (ref 0.2–0.8)
MONOCYTES RELATIVE PERCENT: 6.5 %
NEUTROPHILS ABSOLUTE: 9.9 K/UL (ref 1.4–6.5)
NEUTROPHILS RELATIVE PERCENT: 93 %
NUCLEATED RED BLOOD CELLS: 1 /100 WBC
PDW BLD-RTO: 25.3 % (ref 11.5–14.5)
PERFORMED ON: ABNORMAL
PLATELET # BLD: 402 K/UL (ref 130–400)
PLATELET SLIDE REVIEW: ABNORMAL
POIKILOCYTES: ABNORMAL
POTASSIUM SERPL-SCNC: 4.1 MEQ/L (ref 3.4–4.9)
RBC # BLD: 3.73 M/UL (ref 4.2–5.4)
SCHISTOCYTES: ABNORMAL
SODIUM BLD-SCNC: 144 MEQ/L (ref 135–144)
TOTAL PROTEIN: 6 G/DL (ref 6.3–8)
WBC # BLD: 10.6 K/UL (ref 4.8–10.8)

## 2020-08-28 PROCEDURE — 99232 SBSQ HOSP IP/OBS MODERATE 35: CPT | Performed by: INTERNAL MEDICINE

## 2020-08-28 PROCEDURE — 83735 ASSAY OF MAGNESIUM: CPT

## 2020-08-28 PROCEDURE — C9113 INJ PANTOPRAZOLE SODIUM, VIA: HCPCS | Performed by: NURSE PRACTITIONER

## 2020-08-28 PROCEDURE — 2580000003 HC RX 258: Performed by: INTERNAL MEDICINE

## 2020-08-28 PROCEDURE — APPSS15 APP SPLIT SHARED TIME 0-15 MINUTES: Performed by: NURSE PRACTITIONER

## 2020-08-28 PROCEDURE — 99233 SBSQ HOSP IP/OBS HIGH 50: CPT | Performed by: PSYCHIATRY & NEUROLOGY

## 2020-08-28 PROCEDURE — 2580000003 HC RX 258: Performed by: NURSE PRACTITIONER

## 2020-08-28 PROCEDURE — 85025 COMPLETE CBC W/AUTO DIFF WBC: CPT

## 2020-08-28 PROCEDURE — 6370000000 HC RX 637 (ALT 250 FOR IP): Performed by: INTERNAL MEDICINE

## 2020-08-28 PROCEDURE — 85018 HEMOGLOBIN: CPT

## 2020-08-28 PROCEDURE — 80053 COMPREHEN METABOLIC PANEL: CPT

## 2020-08-28 PROCEDURE — 36415 COLL VENOUS BLD VENIPUNCTURE: CPT

## 2020-08-28 PROCEDURE — 6360000002 HC RX W HCPCS: Performed by: NURSE PRACTITIONER

## 2020-08-28 PROCEDURE — 85014 HEMATOCRIT: CPT

## 2020-08-28 PROCEDURE — 6360000002 HC RX W HCPCS: Performed by: INTERNAL MEDICINE

## 2020-08-28 RX ORDER — INSULIN GLARGINE 100 [IU]/ML
45 INJECTION, SOLUTION SUBCUTANEOUS NIGHTLY
Status: DISCONTINUED | OUTPATIENT
Start: 2020-08-28 | End: 2020-08-28 | Stop reason: HOSPADM

## 2020-08-28 RX ORDER — INSULIN GLARGINE 100 [IU]/ML
45 INJECTION, SOLUTION SUBCUTANEOUS NIGHTLY
Qty: 1 VIAL | Refills: 3 | Status: SHIPPED | OUTPATIENT
Start: 2020-08-28 | End: 2020-09-15 | Stop reason: SDUPTHER

## 2020-08-28 RX ADMIN — HYDRALAZINE HYDROCHLORIDE 100 MG: 100 TABLET, FILM COATED ORAL at 09:29

## 2020-08-28 RX ADMIN — FUROSEMIDE 40 MG: 40 TABLET ORAL at 09:29

## 2020-08-28 RX ADMIN — SODIUM CHLORIDE 200 MG: 9 INJECTION, SOLUTION INTRAVENOUS at 15:01

## 2020-08-28 RX ADMIN — PANTOPRAZOLE SODIUM 40 MG: 40 TABLET, DELAYED RELEASE ORAL at 09:42

## 2020-08-28 RX ADMIN — INSULIN LISPRO 4 UNITS: 100 INJECTION, SOLUTION INTRAVENOUS; SUBCUTANEOUS at 09:33

## 2020-08-28 RX ADMIN — CARVEDILOL 50 MG: 25 TABLET, FILM COATED ORAL at 09:41

## 2020-08-28 RX ADMIN — PAROXETINE HYDROCHLORIDE 40 MG: 20 TABLET, FILM COATED ORAL at 09:29

## 2020-08-28 RX ADMIN — Medication 10 ML: at 06:49

## 2020-08-28 RX ADMIN — INSULIN LISPRO 6 UNITS: 100 INJECTION, SOLUTION INTRAVENOUS; SUBCUTANEOUS at 12:35

## 2020-08-28 RX ADMIN — DIGOXIN 125 MCG: 125 TABLET ORAL at 09:29

## 2020-08-28 RX ADMIN — PANTOPRAZOLE SODIUM 40 MG: 40 INJECTION, POWDER, FOR SOLUTION INTRAVENOUS at 06:49

## 2020-08-28 ASSESSMENT — ENCOUNTER SYMPTOMS
NAUSEA: 0
COLOR CHANGE: 0
TROUBLE SWALLOWING: 0
EYES NEGATIVE: 1
BLOOD IN STOOL: 0
COUGH: 0
WHEEZING: 0
RESPIRATORY NEGATIVE: 1
SHORTNESS OF BREATH: 0
CHEST TIGHTNESS: 0
GASTROINTESTINAL NEGATIVE: 1
VOMITING: 0
STRIDOR: 0

## 2020-08-28 ASSESSMENT — PAIN SCALES - GENERAL
PAINLEVEL_OUTOF10: 0
PAINLEVEL_OUTOF10: 0

## 2020-08-28 NOTE — PROGRESS NOTES
[id number] [facility] Neurology Daily Progress Note  Name: Laurie Sanchez  Age: 67 y.o. Gender: female  CodeStatus: DNR-CCA  Allergies: Ibuprofen  Metformin And Related  Darvon [Propoxyphene Hcl]    Chief Complaint:Flank Pain (right)    Primary Care Provider: Alex Aguilar MD  InpatientTreatment Team: Treatment Team: Attending Provider: Abdi Martins MD; Consulting Physician: Patricia Perrin MD; Consulting Physician: Phuong Diaz MD; Utilization Reviewer: Emilee Haider RN; Registered Nurse: Montie Crigler, RN; Consulting Physician: Burke Redd MD; Consulting Physician: Chester Cruz MD; Consulting Physician: Ronn Arambula MD; : Cirilo To, RN; : Terrie Mcwilliams RN; Registered Nurse: Irineo Hassan RN; LPN: America Yeager LPN; Patient Care Tech: Meek Galindo  Admission Date: 8/24/2020      HPI Pt seen and examined for neuro follow up for acute encephalopathy in the setting of hyperglycemia and anemia. Patient currently alert and oriented x1-2, no acute distress, cooperative. Behaviors improving. No focal deficits. No seizure activity reported. Respiratory status improved. Vitals:    08/28/20 0941   BP: 136/63   Pulse: 77   Resp: 20   Temp: 97.3 °F (36.3 °C)   SpO2: 93%      Review of Systems   Constitutional: Negative for appetite change, chills, fatigue and fever. HENT: Negative for hearing loss and trouble swallowing. Eyes: Negative for visual disturbance. Respiratory: Negative for cough, chest tightness, shortness of breath and wheezing. Cardiovascular: Negative for chest pain, palpitations and leg swelling. Gastrointestinal: Negative for nausea and vomiting. Skin: Negative for color change and rash. Neurological: Positive for weakness (  Generalized). Negative for dizziness, tremors, seizures, syncope, facial asymmetry, speech difficulty, light-headedness, numbness and headaches. Psychiatric/Behavioral: Positive for confusion.  Negative for agitation and hallucinations. The patient is not nervous/anxious. Physical Exam  Vitals signs and nursing note reviewed. Constitutional:       General: She is not in acute distress. Appearance: She is not diaphoretic. HENT:      Head: Normocephalic and atraumatic. Eyes:      Extraocular Movements: Extraocular movements intact. Pupils: Pupils are equal, round, and reactive to light. Cardiovascular:      Rate and Rhythm: Normal rate and regular rhythm. Pulmonary:      Effort: Pulmonary effort is normal. No respiratory distress. Breath sounds: Normal breath sounds. Skin:     General: Skin is warm and dry. Neurological:      General: No focal deficit present. Mental Status: She is alert. Mental status is at baseline. She is disoriented. Cranial Nerves: No cranial nerve deficit. Motor: No tremor or seizure activity.                Medications:  Reviewed    Infusion Medications:    dextrose       Scheduled Medications:    iron sucrose  200 mg Intravenous Once    furosemide  40 mg Oral Daily    digoxin  125 mcg Oral Daily    pantoprazole  40 mg Oral QAM AC    PARoxetine  40 mg Oral QAM    insulin glargine  35 Units Subcutaneous Nightly    insulin lispro  8 Units Subcutaneous TID WC    sodium chloride  20 mL Intravenous Once    iron sucrose  200 mg Intravenous Q24H    iron sucrose  200 mg Intravenous Once    QUEtiapine  25 mg Oral Nightly    sodium chloride  20 mL Intravenous Once    morphine  6 mg Intravenous Once    pantoprazole  40 mg Intravenous Q12H    And    sodium chloride (PF)  10 mL Intravenous Q12H    insulin lispro  0-12 Units Subcutaneous TID WC    insulin lispro  0-6 Units Subcutaneous Nightly    carvedilol  50 mg Oral BID    verapamil  120 mg Oral Nightly    rosuvastatin  40 mg Oral QPM    hydrALAZINE  100 mg Oral BID    sodium chloride flush  10 mL Intravenous 2 times per day    sodium chloride  20 mL Intravenous Once     PRN Meds: volume loss is present, with moderate predominantly supratentorial white matter changes most consistent with chronic small vessel ischemic disease. A few old lacunar infarcts are noted within the periventricular white matter and the anomi to the left of midline. Impression NO ACUTE INTRACRANIAL PROCESS IDENTIFIED. MODERATE ATROPHY, INVOLUTIONAL CHANGES, AND SMALL OLD LACUNAR INFARCTS. MRA of the Head and Neck: No results found for this or any previous visit. No results found for this or any previous visit. No results found for this or any previous visit. CT of the Head:   Results for orders placed during the hospital encounter of 08/24/20   CT HEAD WO CONTRAST    Narrative CT HEAD WO CONTRAST, CT HEAD WO CONTRAST    CLINICAL HISTORY:  loss of coordination     COMPARISON: July 7, 2019. TECHNIQUE: Multiple unenhanced serial axial images of the brain from the vertex of the skull to the base of the skull were performed. FINDINGS: The ventricles are dilated. Unchanged size configuration. .  No mass. No midline shift. The cisterns are patent. There are white matter and periventricular changes most likely consistent with chronic small vessel disease. Area of low-attenuation the left basal ganglia. Unchanged. Likely old lacunar infarct. Area of old infarct within the right posterior occipital subcortical White matter. No acute intra-axial or extra-axial findings. The visualized osseous structures are unremarkable. The visualized portion of the paranasal sinuses, and mastoids are unremarkable. Impression NO ACUTE INTRA-AXIAL OR EXTRA-AXIAL FINDINGS.     IF SIGNS OR SYMPTOMS PERSIST THEN RECOMMEND MRI TO FURTHER EVALUATE IF THERE ARE NO CONTRAINDICATIONS    All CT scans at this facility use dose modulation, iterative reconstruction, and/or weight based dosing when appropriate to reduce radiation dose to as low as reasonably achievable. CT HEAD WO CONTRAST, CT HEAD WO CONTRAST at 0349 hours. CLINICAL HISTORY: Confusion    COMPARISON: August 24, 2020 2141 hours    TECHNIQUE: Multiple unenhanced serial axial images of the brain from the vertex of the skull to the base of the skull were performed. FINDINGS: The ventricles are dilated. Unchanged size configuration. .  No mass. No midline shift. The cisterns are patent. There are white matter and periventricular changes most likely consistent with chronic small vessel disease. 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 Faint hypodensity seen in the right thalamus. Grossly unchanged as compared to prior studies dating back to July 7, 2019. Again note is made of old infarct within right occipital subcortical White matter. No acute intra-axial or extra-axial findings. The visualized osseous structures are unremarkable. The visualized portion of the paranasal sinuses, and mastoids are unremarkable. IMPRESSION:    NO ACUTE INTRA-AXIAL OR EXTRA-AXIAL FINDINGS. IF SIGNS OR SYMPTOMS PERSIST THEN RECOMMEND MRI TO FURTHER EVALUATE IF THERE ARE NO CONTRAINDICATIONS    All CT scans at this facility use dose modulation, iterative reconstruction, and/or weight based dosing when appropriate to reduce radiation dose to as low as reasonably achievable. No results found for this or any previous visit. No results found for this or any previous visit. Carotid duplex: No results found for this or any previous visit. No results found for this or any previous visit.   Results for orders placed during the hospital encounter of 08/24/20   US CAROTID ARTERY BILATERAL    Narrative EXAMINATION:  CAROTID DUPLEX ULTRASONOGRAPHY    CLINICAL HISTORY:  MENTAL STATUS CHANGES, CONFUSION    COMPARISONS:  March 8, 2019    TECHNIQUE:  B-mode, color flow and spectral Doppler    FINDINGS:      ARTERIAL BLOOD FLOW VELOCITY    RIGHT PS                                                   Prox CCA 92 cm/s               Mid CCA     99 cm/s                Dist CCA    78 cm/s                Prox ICA    94 cm/s                 Mid ICA     80 cm/s              Dist ICA    232 cm/s               Prox ECA    70 cm/s               Prox VERT   92 cm/s                  ICA/CCA     2.3                            LEFT PS    Prox CCA    153 cm/s  Mid CCA     101 cm/s  Dist CCA    92 cm/s  Prox ICA    93 cm/s  Mid ICA     95 cm/s  Dist ICA    146 cm/s  Prox ECA    140 cm/s  Prox VERT   71 cm/s    ICA/CCA     1.4      Impression SCATTERED ATHEROSCLEROSIS AND DIFFUSE INTIMAL THICKENING NOTED BILATERAL ICA AND COMMON CAROTIDS. BILATERAL ICAS ARE VERY TORTUOUS. BILATERAL ANTEGRADE VERTEBRAL FLOW    RIGHT ICA BY VELOCITIES APPROXIMATELY 70% HOWEVER THIS MAY BE OVERESTIMATED DUE TO THE TORTUOSITY OF THE ICA. LEFT ICA 50-69% STENOSIS. Echo No results found for this or any previous visit. Assessment/Plan:    8/26/20:  Encephalopathy related to underlying fatigue and shortness of breath.  Patient had some speech issues which are very difficult to certain if this were a aphasia or not.  Complete vascular profile will be obtained including a carotid ultrasound.  Patient has anemia and is not on antiplatelet agents.  She has had EGD as well as colonoscopy.  At this time I have not recommended any antiplatelet agents unless we have a source of her bleeding.  We will arrange for a carotid ultrasound and see if she has any other risk factors for cerebrovascular disease.   Patient is noted examination at this time appears to be normal.  Since last seen patient was transferred to the regular medical floor she did not sleep all night appears to be very angry at this time. Debra Fallow though remains oriented to self place month and year.  She told me to leave her alone as she wanted to sleep and she ate later.  This findings do not suggest an encephalopathy.  This may be Suggestion of sleep deprivation and patient should be allowed to rest for now before we can come to some other      8/27/20:  Carotid duplex with right ICA velocities of 70% and left ICA 50 to 69%  Dr. Malik Willoughby spoke with patient's family who reported that her memory fluctuates at home. Patient may have the beginnings of underlying vascular dementia. We will address this further on outpatient basis  Will obtain MRI of brain given patient's continued confusion and risk factors for CVD  Will assess ABGs, TSH, B12, ammonia level  Assess nocturnal pulse oximetry     8/28/20:  MRI brain with no acute intracranial process. Moderate cerebral atrophy noted and S VID with remote lacunar infarcts in periventricular white matter and naomi. Nocturnal pulse ox within normal limits  Patient could benefit from aspirin 81 mg daily due to carotid stenosis and lacunar infarcts. Recently with low hemoglobin. Will clear with hematology  Okay to DC from neurology standpoint  Follow-up 4 to 6 weeks    I independently performed an evaluation on this patient. I have reviewed the above documentation completed by the Nurse Practitioner. Please see my additional contributions to the HPI, physical exam, assessment/medical decision making. Mehul YOLETTE Willoughby MD, [address] [employer] of [employer]iat[employer] & Neu[employer]  Board Certified in Vascular Neurology  Board Certified in Neuromuscular Medicine  Certified in Neurorehabilitation       Collaborating physicians: Dr Malik Willoughby    Electronically signed by JOHNATHON Josue CNP on 8/28/2020 at 1:12 PM

## 2020-08-28 NOTE — FLOWSHEET NOTE
2300 Shift assessment complete. VS are stable. Pt was lethargic. We took pts blood sugar and it was 69. I gave her 2 OJs and a pack of crackers. She was talking to me and able to take her night time meds. Call light is within reach. Avasys in room. Will continue to monitor. 0000 sugar was 101.    0230 sugar was 165.    0330 Pt is up and going to the bathroom. She is unsteady on her feet. It took two people to help her go to the bathroom. She did have an episode of incontinence. Requesting something to drink. Call light within reach. Will continue to monitor. 0630 pt is resting in bed. She slept well through the night. Seems more lethargic than usual, but was given in report that that was how she was all day yesterday. She did get up once to go to the restroom and had one episode of incontinence. Very unsteady on her feet. Will continue to monitor. 0650 pts IV in left AC was occluded. I removed it. IV in rt [facility] works perfectly.

## 2020-08-28 NOTE — PROGRESS NOTES
Progress Note  Patient: José Miguel Bright  Unit/Bed: J868/Y011-98  YOB: 1945  MRN: [id number]  Acct: [de-identified]   Admitting Diagnosis: Symptomatic anemia [D64.9]  Symptomatic anemia [D64.9]  Admit Date:  8/24/2020  Hospital Day: 3    Chief Complaint: anemia MS changes    Histories:  Past Medical History:   Diagnosis Date    Anxiety     Asthma     dx 2019 / has smoked since age 14    CHF (congestive heart failure) (HCC)     Chronic back pain     Bilateral L5 S1 Radic on emg--surprisingly worse on the left than the right--pt's symptoms and her MRI show worse on the right    Chronic obstructive pulmonary disease with acute exacerbation ([address] 10/12/2019    Depression     Fibromyalgia     Hyperlipidemia     meds > 8 yrs    Hypertension     meds > 45 yrs    On home O2     2l per n/c at bedtime mostly,     Osteoarthritis     Type II diabetes mellitus, uncontrolled ([address]     hx > 8 yrs    Unspecified sleep apnea      Past Surgical History:   Procedure Laterality Date    BACK SURGERY  2017    lumbar disc    CARDIAC CATHETERIZATION  11/3/14     DR. MIRELES / no stents    COLONOSCOPY  08/29/2016    w/polypectomy     DILATION AND CURETTAGE OF UTERUS N/A 10/7/2019    EUA HYSTEROSCOPY DILATATION AND CURETTAGE performed by Naina Mckeon DO at [facility]. [address], COLON, DIAGNOSTIC      EYE SURGERY      [place] with IOL OU / [address] [place]  [address]    umbilical hernia repair    TN ESOPHAGOGASTRODUODENOSCOPY TRANSORAL DIAGNOSTIC N/A 3/24/2017    EGD ESOPHAGOGASTRODUODENOSCOPY performed by Josh Gusman MD at [facility] N/A 2/8/2018    negative findings    TN REVISE MEDIAN N/CARPAL TUNNEL SURG Left 6/5/2017    LEFT  CARPAL TUNNEL RELEASE performed by Madelaine Mosher MD at [facility],2+O/S,IBYTA N/A 2/8/2018    TONSILLECTOMY      as child    UPPER GASTROINTESTINAL ENDOSCOPY  08/26/2016    w/bx     UPPER GASTROINTESTINAL ENDOSCOPY N/A 2020    EGD possible biopsy performed by Sadie Heath MD at [facility] N/A 2020    EGD PUSH ENTEROSCOPY performed by [facility]ldon Sims [facility] at [facility]     Family History   Problem Relation Age of Onset    Heart Disease Father         cardiac bypass    Arthritis Father     Arthritis Mother     Other Mother          at age 80    Other Sister         [facility]    No Known Problems Daughter     Stroke Son      Social History     Socioeconomic History    Marital status:      Spouse name: None    Number of children: None    Years of education: None    Highest education level: None   Occupational History    Occupation: Retired-   Social Needs    Financial resource strain: None    Food insecurity     Worry: None     Inability: None    Transportation needs     Medical: None     Non-medical: None   Tobacco Use    Smoking status: Former Smoker     Packs/day: 1.00     Years: 59.00     Pack years: 59.00     Types: Cigarettes     Start date: 2017    Smokeless tobacco: Never Used    Tobacco comment: quit 2-3 weeks ago    Substance and Sexual Activity    Alcohol use: Not Currently    Drug use: No    Sexual activity: Yes   Lifestyle    Physical activity     Days per week: None     Minutes per session: None    Stress: None   Relationships    Social connections     Talks on phone: None     Gets together: None     Attends Alevism service: None     Active member of club or organization: None     Attends meetings of clubs or organizations: None     Relationship status: None    Intimate partner violence     Fear of current or ex partner: None     Emotionally abused: None     Physically abused: None     Forced sexual activity: None   Other Topics Concern    None   Social History Narrative    None       Subjective/HPI    in room.  Pt somnolent this am. No new events overnight. Discussed with RN - pt is much more pleasant and cooperative. Walked to bathroom this am.  Pt wants to go home and  is happy to take her so that she can be in her own surroundings. EKG:        Review of Systems:   Review of Systems   Constitutional: Negative. Negative for diaphoresis and fatigue. HENT: Negative. Eyes: Negative. Respiratory: Negative. Negative for cough, chest tightness, shortness of breath, wheezing and stridor. Cardiovascular: Negative. Negative for chest pain, palpitations and leg swelling. Gastrointestinal: Negative. Negative for blood in stool and nausea. Genitourinary: Negative. Musculoskeletal: Negative. Skin: Negative. Neurological: Positive for weakness. Negative for dizziness, syncope and light-headedness. Hematological: Negative. Psychiatric/Behavioral: Negative. Physical Examination:    /63   Pulse 77   Temp 97.3 °F (36.3 °C) (Oral)   Resp 20   Ht 4' 11\" (1.499 m)   Wt 130 lb (59 kg)   LMP  (LMP Unknown)   SpO2 93%   BMI 26.26 kg/m²    Physical Exam   Constitutional: She appears healthy. No distress. HENT:   Normal cephalic and Atraumatic   Eyes: Pupils are equal, round, and reactive to light. Neck: Normal range of motion and thyroid normal. Neck supple. No JVD present. No neck adenopathy. No thyromegaly present. Cardiovascular: Normal rate, regular rhythm, intact distal pulses and normal pulses. Murmur heard. Pulmonary/Chest: Effort normal and breath sounds normal. She has no wheezes. She has no rales. She exhibits no tenderness. Abdominal: Soft. Bowel sounds are normal. There is no abdominal tenderness. Musculoskeletal: Normal range of motion. General: No tenderness or edema. Neurological: She is alert and oriented to person, place, and time. Skin: Skin is warm. No cyanosis. Nails show no clubbing.        LABS:  CBC:   Lab Results   Component Value Date    WBC 10.6 08/28/2020    RBC 3.73 08/28/2020    HGB 8.4 08/28/2020    HCT 27.0 08/28/2020    MCV 72.3 08/28/2020    MCH 22.6 08/28/2020    MCHC 31.3 08/28/2020    RDW 25.3 08/28/2020     08/28/2020    MPV 8.8 08/01/2015     CBC with Differential:    Lab Results   Component Value Date    WBC 10.6 08/28/2020    RBC 3.73 08/28/2020    HGB 8.4 08/28/2020    HCT 27.0 08/28/2020     08/28/2020    MCV 72.3 08/28/2020    MCH 22.6 08/28/2020    MCHC 31.3 08/28/2020    RDW 25.3 08/28/2020    NRBC 1 08/28/2020    LYMPHOPCT 1.0 08/28/2020    MONOPCT 6.5 08/28/2020    BASOPCT 0.5 08/28/2020    MONOSABS 0.7 08/28/2020    LYMPHSABS 0.1 08/28/2020    EOSABS 0.0 08/28/2020    BASOSABS 0.0 08/28/2020     CMP:    Lab Results   Component Value Date     08/28/2020    K 4.1 08/28/2020    K 4.6 07/03/2020     08/28/2020    CO2 20 08/28/2020    BUN 57 08/28/2020    CREATININE 1.69 08/28/2020    GFRAA 35.6 08/28/2020    LABGLOM 29.4 08/28/2020    GLUCOSE 176 08/28/2020    PROT 6.0 08/28/2020    LABALBU 3.5 08/28/2020    CALCIUM 9.5 08/28/2020    BILITOT 0.3 08/28/2020    ALKPHOS 125 08/28/2020    AST 42 08/28/2020    ALT 60 08/28/2020     BMP:    Lab Results   Component Value Date     08/28/2020    K 4.1 08/28/2020    K 4.6 07/03/2020     08/28/2020    CO2 20 08/28/2020    BUN 57 08/28/2020    LABALBU 3.5 08/28/2020    CREATININE 1.69 08/28/2020    CALCIUM 9.5 08/28/2020    GFRAA 35.6 08/28/2020    LABGLOM 29.4 08/28/2020    GLUCOSE 176 08/28/2020     Magnesium:    Lab Results   Component Value Date    MG 2.6 08/28/2020     Troponin:    Lab Results   Component Value Date    TROPONINI 0.072 08/27/2020        Active Hospital Problems    Diagnosis Date Noted    Acute encephalopathy [G93.40]      Priority: Low    Flash pulmonary edema ([facility] [J81.0] 08/25/2020     Priority: Low    Acute on chronic kidney failure ([facility] [N17.9, N18.9] 08/25/2020     Priority: Low    Dysarthria [R47.1]      Priority: Low    Chronic fatigue [R53.82] Priority: Low    Symptomatic anemia [D64.9] 08/24/2020     Priority: Low    Uncontrolled type 2 diabetes mellitus with hyperglycemia (HCC) [E11.65]      Priority: Low        Assessment/Plan:  1. Anemia -  Transfused. Hb 8.4 this am.   2. H/o Gastric AVM  3. MS changes/ Encephalopathy  4. Milld CAD  5. H/o HOCM   6. CKD with elevated Trops. - no active angina. 7. Continue CV protective meds  8. CUS Moderate plaque B/L. ICAs are very tortuous and likely causing elevated Velocities. - will address as out pt. Once GFR normalizes - she can have CTA vs Angio as out pt. 9. OK for DC to Home with  and f/u Dr. Sienna Gaines 2 weeks.         Electronically signed by Elvira Salguero MD on 8/28/2020 at 10:39 AM

## 2020-08-28 NOTE — CARE COORDINATION
Spoke with spouse Cortney Somers. [place] of choice given. Spouse states that he feels 100% okay taking care and assisting with care of his wife at home without assistance. Declines any needs for d/c at this time. He is awaiting phone call to  his wife. Spoke with Lashawn Aguilar RN. She states she will give Venofer as ordered by Dr Ledy Elizabeth before d/c. She states spouse will be back this afternoon for d/c of patient.

## 2020-08-28 NOTE — CONSULTS
[address]                      [address], [address] [facility]                                  CONSULTATION    PATIENT NAME: Aneta Sorto                   :        1945  MED REC NO:   [id number]                            ROOM:       H144  ACCOUNT NO:   [de-identified]                           ADMIT DATE: 2020  PROVIDER:     Helen June MD    CONSULT DATE:  2020    ENDOCRINE CONSULT    REFERRING PROVIDER:  Misty Adams MD    REASON FOR CONSULTATION:  Management of uncontrolled type 2 diabetes. CHIEF COMPLAINT AND HISTORY OF PRESENT ILLNESS:  The patient is a  18-year-old female with history of poorly controlled diabetes,  hypertension, anemia, and COPD presenting with flank pain, shortness of  breath, and changes in the mental status with difficulty concentrating. She has been following up with different doctors for her anemia; GI  workup was also negative, was planning to see Hematology. Blood sugars  have been fluctuating mostly on the higher side due to noncompliance  with diet. The patient has been on Novolin 70/30 between 30-40 units  b.i.d., but according to patient's daughter, the patient has been  noncompliant. At this time, the patient is very drowsy after she was sedated after her  MRI, which was not completed as the patient got agitated. Blood sugars  here have been staying between 200 to 500 range. Her glucose was 439 in  the chemistries from this morning. Sodium 136, potassium 4.5, chloride  was 106, BUN 71, creatinine 2.27, CO2 was 21. Her last hemoglobin A1c  was 9.6. Prior A1c have been fluctuating between 6.9 to as high as 14. Complication of diabetes include chronic kidney disease, congestive  heart failure, and diabetic neuropathy. The patient was put on 75/25  Humalog 30 units twice a day plus Humalog coverage.     PAST MEDICAL HISTORY:  Type 2 diabetes, sleep apnea, anemia,  hypertension, hypercholesteremia, fibromyalgia, depression, back pain,  asthma, and anxiety. PAST SURGICAL HISTORY:  Endoscopy, tonsillectomy, hernia repair, spine  surgery, colonoscopy, endoscopy, cardiac catheterization, back surgery. FAMILY HISTORY:  Arthritis and heart disease. PERSONAL AND SOCIAL HISTORY:  Former smoker. Denies any alcohol. MEDICATIONS:  The patient's home medications include Crestor, Novolin  70/30 at 30-40 units twice a day, Calan, Paxil, Lanoxin, hydralazine,  Lasix, Protonix, Bentyl, Zofran. Meds here include 75/25 Humalog 30  units twice a day, Humalog coverage, also p.r.n. Novolin N and R,  Ativan, Protonix, Paxil, Seroquel, Crestor. ALLERGIES:  Include IBUPROFEN, METFORMIN, DARVON. REVIEW OF SYSTEMS:  Other than changes in mental status, hyperglycemia,  and weakness, a 14-point review of system was noncontributory. PHYSICAL EXAMINATION:  GENERAL:  The patient is very drowsy, unable to answer questions. VITAL SIGNS:  Blood pressure was 110/91, pulse rate was 82, respiratory  rate was 24, temperature of 97.3. HEENT:  Normocephalic, atraumatic. Oral mucosa was very dry. NECK:  Supple. Trachea in midline. CHEST:  Lungs were clear to auscultation bilaterally. No wheezing or  crackles were heard. CARDIOVASCULAR:  Heart sounds were normal to slightly tachycardia. No  murmurs or thrills are present. ABDOMEN:  Soft, nontender. Bowel sounds were present. No organomegaly  or tenderness. EXTREMITIES:  Lower extremities reveal no edema. SKIN:  Intact. NEUROLOGIC:  The patient unable to complete. PSYCHIATRIC:  The patient again is drowsy. LABORATORY STUDIES:  As above. ASSESSMENT:  Uncontrolled diabetes, chronic kidney disease, anemia,  encephalopathy, flash pulmonary edema. PLAN:  We will discontinue 75/25 Humalog. Start the patient on Lantus  35 at night, Humalog 8 with each meals plus Humalog coverage. Blood  sugar goal here 150 to low 200 range. Avoid hypoglycemia. Continue  other treatment protocol as per Cardiology. Neuro and Pulmonary is also  on consult and Psychiatry and Hematology. Monitor glucose levels  closely. Thank you for the consult.         iSlviano Resendez MD    D: 08/27/2020 22:37:04       T: 08/27/2020 22:45:10     [email or web address]  Job#: [id number]     Doc#: [id number]    CC:

## 2020-08-28 NOTE — PROGRESS NOTES
NP        Or    ondansetron (ZOFRAN) injection 4 mg  4 mg Intravenous Q6H PRN Cheryl Chowdary RN, NP        pantoprazole (PROTONIX) injection 40 mg  40 mg Intravenous Q12H Cheryl Chowdary RN, NP   40 mg at 08/28/20 0649    And    sodium chloride (PF) 0.9 % injection 10 mL  10 mL Intravenous Q12H Cheryl Chowdary RN, NP   10 mL at 08/28/20 0649    insulin lispro (HUMALOG) injection vial 0-12 Units  0-12 Units Subcutaneous TID WC Ne Wick MD   4 Units at 08/28/20 0933    insulin lispro (HUMALOG) injection vial 0-6 Units  0-6 Units Subcutaneous Nightly Ne Wick MD   6 Units at 08/26/20 2207    glucose (GLUTOSE) 40 % oral gel 15 g  15 g Oral PRN Ne Wick MD        dextrose 50 % IV solution  12.5 g Intravenous PRN Ne Wick MD        glucagon (rDNA) injection 1 mg  1 mg Intramuscular PRN Ne Wick MD        dextrose 5 % solution  100 mL/hr Intravenous PRN Ne Wick MD        carvedilol (COREG) tablet 50 mg  50 mg Oral BID Nishant Meraz MD   50 mg at 08/28/20 0941    verapamil (CALAN SR) extended release tablet 120 mg  120 mg Oral Nightly Nishant Meraz MD   120 mg at 08/27/20 2319    rosuvastatin (CRESTOR) tablet 40 mg  40 mg Oral QPM Nishant Meraz MD   Stopped at 08/27/20 1924    hydrALAZINE (APRESOLINE) tablet 100 mg  100 mg Oral BID Nishant Meraz MD   100 mg at 08/28/20 5895    sodium chloride flush 0.9 % injection 10 mL  10 mL Intravenous 2 times per day Sohan Sun MD   10 mL at 08/27/20 2300    sodium chloride flush 0.9 % injection 10 mL  10 mL Intravenous PRN Sohan Sun MD        0.9 % sodium chloride bolus  20 mL Intravenous Once Ellender MANA ChavesC           PHYSICAL EXAM:    EYES:  Lids and lashes normal, pupils equal, round and reactive to light, extra ocular muscles intact, sclera clear, conjunctiva normal    ENT:  Normocephalic, without obvious abnormality, atraumatic, sinuses nontender on palpation, external ears without lesions, oral pharynx with moist mucus membranes, tonsils without erythema or exudates, gums normal and good dentition. NECK:  Supple, symmetrical, trachea midline, no adenopathy, thyroid symmetric, not enlarged and no tenderness, skin normal    CHEST:    LUNGS:  No increased work of breathing, good air exchange, clear to auscultation bilaterally, no crackles or wheezing    CARDIOVASCULAR:  Normal apical impulse, regular rate and rhythm, normal S1 and S2, no S3 or S4, and no murmur noted    ABDOMEN:  No scars, normal bowel sounds, soft, non-distended, non-tender, no masses palpated, no hepatosplenomegally    MUSCULOSKELETAL:  There is no redness, warmth, or swelling of the joints. Full range of motion noted. Motor strength is 5 out of 5 all extremities bilaterally.   Tone is normal.  EXTREMITIES:    NEURO:    DATA:      PT/INR:  No results found for: PTINR  PTT:    Lab Results   Component Value Date    APTT 27.8 08/25/2020     CMP:    Lab Results   Component Value Date     08/28/2020    K 4.1 08/28/2020    K 4.6 07/03/2020     08/28/2020    CO2 20 08/28/2020    BUN 57 08/28/2020    PROT 6.0 08/28/2020     Magnesium:    Lab Results   Component Value Date    MG 2.6 08/28/2020     Phosphorus:  No components found for: PO4  Calcium:  No components found for: CA  CBC:    Lab Results   Component Value Date    WBC 10.6 08/28/2020    RBC 3.73 08/28/2020    HGB 8.1 08/28/2020    HCT 26.8 08/28/2020    MCV 72.3 08/28/2020    RDW 25.3 08/28/2020     08/28/2020     DIFF:    Lab Results   Component Value Date    MCV 72.3 08/28/2020    RDW 25.3 08/28/2020      LDH:    Lab Results   Component Value Date     08/26/2020     Uric Acid:  No components found for: URIC    EKG Reviewed  Appropriate Radiology Reviewed      Pathology: Reviewed where indicated      ASSESSMENT:  Principal Problem:    Symptomatic anemia  Active Problems:    Uncontrolled type 2 diabetes mellitus with hyperglycemia ([facility] Flash pulmonary edema (HCC)    Acute on chronic kidney failure (HCC)    Dysarthria    Chronic fatigue    Acute encephalopathy  Resolved Problems:    * No resolved hospital problems.  *    Patient Active Problem List   Diagnosis    Hypertension    Hyperlipidemia    Uncontrolled type 2 diabetes mellitus with complication, without long-term current use of insulin (HCC)    Fibromyalgia    Anxiety    Smoker    Insomnia    DJD (degenerative joint disease), lumbar    Lumbosacral radiculopathy at S1    DDD (degenerative disc disease), lumbar    Dysphonia    CTS (carpal tunnel syndrome)    High risk medication use-Norco - 12/20/17 OARRS PM&R, 02/20/18 OARRS PM&R, 03/07/18 OARRS PM&R, Urine Drug screen negative 02/06/17 PM&R--MED CONTRACT 2/6/17    SOB (shortness of breath) on exertion    Chest pain    Memory deficit    Artificial lens present    Presbyopia    Astigmatism, regular    Cataract, nuclear sclerotic senile    IDDM (insulin dependent diabetes mellitus) ([address]    Regular astigmatism    Nuclear senile cataract    Neck pain    Lumbosacral radiculopathy at L5    Spinal stenosis of lumbar region with neurogenic claudication    Impaired mobility and activities of daily living    Non-compliant patient NO showed FU 1/10/17 Dr Domo Wagner Insomnia secondary to chronic pain    Reactive depression    Diabetic radiculopathy (HCC)    Cervical radicular pain    Diabetic asymmetric polyneuropathy ([facility])    Myalgia    Intercostal neuropathy    Osteoarthritis of spine with radiculopathy, lumbar region    Acute combined systolic and diastolic CHF, NYHA class 1 (HCC)    PMB (postmenopausal bleeding)    Acute on chronic diastolic heart failure (HCC)    CHF (congestive heart failure) (HCC)    Hypertensive urgency    Uncontrolled type 2 diabetes mellitus with hyperglycemia (HCC)    Chronic obstructive pulmonary disease with acute exacerbation (HCC)    Acute on chronic diastolic (congestive) heart failure ([facility]    SAÚL (acute kidney injury) ([facility]    Hypoglycemia    HOCM (hypertrophic obstructive cardiomyopathy) (HCC)    Gastrointestinal hemorrhage    COPD exacerbation (HCC)    Anemia    AVM (arteriovenous malformation)    Symptomatic anemia    Flash pulmonary edema (HCC)    Acute on chronic kidney failure (HCC)    Dysarthria    Chronic fatigue    Acute encephalopathy       PLAN:  Venofer today.           Electronically signed by Germaine Peña MD on 8/28/20 at 12:11 PM EDT

## 2020-08-29 ENCOUNTER — CARE COORDINATION (OUTPATIENT)
Dept: CASE MANAGEMENT | Age: 75
End: 2020-08-29

## 2020-08-29 PROCEDURE — 1111F DSCHRG MED/CURRENT MED MERGE: CPT | Performed by: FAMILY MEDICINE

## 2020-08-29 NOTE — CARE COORDINATION
[facility] Transitions Initial Follow Up Call    Call within 2 business days of discharge: Yes    Patient: Lesley  Patient : 1945   MRN: [id number]  Reason for Admission: -2020 ED [facility] IP Symptomatic anemia   Discharge Date: 20 RARS: Readmission Risk Score: 31  PCP  4:30, Narayan  10:00    Patient contacted regarding Triston Patiño. Discussed COVID-19 related testing which was not available at this time. Test results were not available. Patient informed of results, if available?     Care Transition Nurse/ Ambulatory Care Manager contacted the patient by telephone to perform post discharge assessment. Verified name and  with family as identifiers. Provided introduction to self, and explanation of the CTN/ACM role, and reason for call due to risk factors for infection and/or exposure to COVID-19. Symptoms reviewed with family who verbalized the following symptoms: Reports generalized weakness. . Drinking nutritional supplements. Denies any home needs. Denies dizziness or SOB. Denies fever, chills, or flu-like symptoms. Due to no new or worsening symptoms encounter was not routed to provider for escalation. Discussed follow-up appointments. If no appointment was previously scheduled, appointment scheduling offered: [facility] follow up appointment(s):   Future Appointments   Date Time Provider Zaid Lr   2020 11:00 AM Soco Delgadillo MD [address] [place] [address]   2020  4:30 PM Taniya Roldan MD [facility]   2020 10:00 AM DO LEONARD [facility]     Non-[facility] follow up appointment(s):      Advance Care Planning:   Does patient have an Advance Directive:  not on file. Patient has following risk factors of: diabetes.  CTN/ACM reviewed discharge instructions, medical action plan and red flags such as increased shortness of breath, increasing fever and signs of decompensation with family who verbalized understanding. Discussed exposure protocols and quarantine with CDC Guidelines What to do if you are sick with coronavirus disease 2019.  Family was given an opportunity for questions and concerns. The family agrees to contact the [facility] exposure line [phone], [facility] department [address]: ([phone]) and PCP office for questions related to their healthcare. CTN/ACM provided contact information for future needs. Reviewed and educated family on any new and changed medications related to discharge diagnosis     Patient/family/caregiver given information for [employer] and agrees to enroll no  Patient's preferred e-mail:    Patient's preferred phone number:   Based on Loop alert triggers, patient will be contacted by nurse care manager for worsening symptoms. Plan for follow-up call in 5-7 days based on severity of symptoms and risk factors. Advance Care Planning  People with COVID-19 may have no symptoms, mild symptoms, such as fever, cough, and shortness of breath or they may have more severe illness, developing severe and fatal pneumonia. As a result, Advance Care Planning with attention to naming a health care decision maker (someone you trust to make healthcare decisions for you if you could not speak for yourself) and sharing other health care preferences is important BEFORE a possible health crisis. Please contact your Primary Care Provider to discuss Advance Care Planning.     Preventing the Spread of Coronavirus Disease 2019 in Homes and Residential Communities  For the most recent information go to [employer]    Prevention steps for People with confirmed or suspected COVID-19 (including persons under investigation) who do not need to be hospitalized  and   People with confirmed COVID-19 who were hospitalized and determined to be medically stable to go home    Your healthcare provider and public health applying the product, such as wearing gloves and making sure you have good ventilation during use of the product. Monitor your symptoms  Seek prompt medical attention if your illness is worsening (e.g., difficulty breathing). Before seeking care, call your healthcare provider and tell them that you have, or are being evaluated for, COVID-19. Put on a facemask before you enter the facility. These steps will help the healthcare providers office to keep other people in the office or waiting room from getting infected or exposed. Ask your healthcare provider to call the local or state health department. Persons who are placed under active monitoring or facilitated self-monitoring should follow instructions provided by their local health department or occupational health professionals, as appropriate. When working with your local health department check their available hours. If you have a medical emergency and need to call 911, notify the dispatch personnel that you have, or are being evaluated for COVID-19. If possible, put on a facemask before emergency medical services arrive. Discontinuing home isolation  Patients with confirmed COVID-19 should remain under home isolation precautions until the risk of secondary transmission to others is thought to be low. The decision to discontinue home isolation precautions should be made on a case-by-case basis, in consultation with healthcare providers and state and local health departments.

## 2020-08-30 LAB
BLOOD CULTURE, ROUTINE: NORMAL
CULTURE, BLOOD 2: NORMAL

## 2020-08-31 PROCEDURE — 93010 ELECTROCARDIOGRAM REPORT: CPT | Performed by: INTERNAL MEDICINE

## 2020-09-04 ENCOUNTER — VIRTUAL VISIT (OUTPATIENT)
Dept: FAMILY MEDICINE CLINIC | Age: 75
End: 2020-09-04
Payer: MEDICARE

## 2020-09-04 PROCEDURE — 99214 OFFICE O/P EST MOD 30 MIN: CPT | Performed by: FAMILY MEDICINE

## 2020-09-04 ASSESSMENT — ENCOUNTER SYMPTOMS
BLOOD IN STOOL: 0
BACK PAIN: 1
VOMITING: 0

## 2020-09-04 ASSESSMENT — COPD QUESTIONNAIRES: COPD: 1

## 2020-09-04 NOTE — PROGRESS NOTES
Subjective:      Patient ID: Carisa Johnson is a 67 y.o. female    Other   This is a recurrent problem. Associated symptoms include arthralgias. Pertinent negatives include no chest pain, chills, fever, rash or vomiting. Mental Health Problem     Congestive Heart Failure   Pertinent negatives include no chest pain or palpitations. COPD   Pertinent negatives include no chest pain or fever. Here in follow up from recent hospital stay for symptomatic anemia. Still feeling some sob and times and fatigue at times. Review of Systems   Constitutional: Negative for chills and fever. Cardiovascular: Negative for chest pain and palpitations. Gastrointestinal: Negative for blood in stool and vomiting. Musculoskeletal: Positive for arthralgias and back pain. Skin: Negative for rash. Neurological: Negative for dizziness. Psychiatric/Behavioral: Positive for decreased concentration. Negative for sleep disturbance.      Reviewed allergy, medical, social, surgical, family and med list changes and updated   Files     Social History     Socioeconomic History    Marital status:      Spouse name: None    Number of children: None    Years of education: None    Highest education level: None   Occupational History    Occupation: Retired-   Social Needs    Financial resource strain: None    Food insecurity     Worry: None     Inability: None    Transportation needs     Medical: None     Non-medical: None   Tobacco Use    Smoking status: Former Smoker     Packs/day: 1.00     Years: 59.00     Pack years: 59.00     Types: Cigarettes     Start date: 11/30/2017    Smokeless tobacco: Never Used    Tobacco comment: quit 2-3 weeks ago    Substance and Sexual Activity    Alcohol use: Not Currently    Drug use: No    Sexual activity: Yes   Lifestyle    Physical activity     Days per week: None     Minutes per session: None    Stress: None   Relationships    Social connections Talks on phone: None     Gets together: None     Attends [employer] service: None     Active member of club or organization: None     Attends meetings of clubs or organizations: None     Relationship status: None    Intimate partner violence     Fear of current or ex partner: None     Emotionally abused: None     Physically abused: None     Forced sexual activity: None   Other Topics Concern    None   Social History Narrative    None     Current Outpatient Medications   Medication Sig Dispense Refill    insulin glargine (LANTUS) 100 UNIT/ML injection vial Inject 45 Units into the skin nightly 1 vial 3    insulin lispro (HUMALOG) 100 UNIT/ML injection vial Inject 0-6 Units into the skin nightly (Patient not taking: Reported on 8/29/2020) 1 vial 3    insulin lispro (HUMALOG) 100 UNIT/ML injection vial Inject 12 Units into the skin 3 times daily (with meals) 1 vial 3    rosuvastatin (CRESTOR) 40 MG tablet Take 1 tablet by mouth every evening 90 tablet 0    ACCU-CHEK PHYLLIS PLUS strip Test 3x daily Dx E11.65 100 each 3    carvedilol (COREG) 25 MG tablet Take 2 tablets by mouth 2 times daily 60 tablet 3    verapamil (CALAN SR) 120 MG extended release tablet Take 1 tablet by mouth nightly 30 tablet 3    PARoxetine (PAXIL) 40 MG tablet TAKE 1 TABLET BY MOUTH ONCE DAILY IN THE MORNING (Patient taking differently: Take 40 mg by mouth every morning TAKE 1 TABLET BY MOUTH ONCE DAILY IN THE MORNING) 90 tablet 0    digoxin (LANOXIN) 125 MCG tablet Take 1 tablet by mouth daily 90 tablet 1    hydrALAZINE (APRESOLINE) 100 MG tablet Take 100 mg by mouth 2 times daily      pantoprazole (PROTONIX) 40 MG tablet Take 40mg twice daily (1st dose 30min before breakfast) for 30 days.  After 30 days, you make take 40mg once daily before breakfast. 60 tablet 3    furosemide (LASIX) 40 MG tablet Take 1 tablet by mouth daily 90 tablet 0    dicyclomine (BENTYL) 10 MG capsule Take 1 capsule by mouth 4 times daily as needed (abdominal pain) 120 capsule 3    ondansetron (ZOFRAN) 4 MG tablet Take 1 tablet by mouth every 8 hours as needed for Nausea or Vomiting (Patient not taking: Reported on 2020) 30 tablet 2     No current facility-administered medications for this visit. Family History   Problem Relation Age of Onset    Heart Disease Father         cardiac bypass    Arthritis Father     Arthritis Mother     Other Mother          at age 80    Other Sister         [facility]    No Known Problems Daughter     Stroke Son      Past Medical History:   Diagnosis Date    Anxiety     Asthma     dx  / has smoked since age 14    [facility]F ([facility]estive [facility] failure) ([facility]     Chronic back pain     Bilateral L5 S1 Radic on emg--surprisingly worse on the left than the right--pt's symptoms and her MRI show worse on the right    Chronic obstructive pulmonary disease with acute exacerbation ([facility] [address] [facility] 10/12/2019    Depression     Fibromyalgia     Hyperlipidemia     meds > 8 yrs    Hypertension     meds > 39 yrs    On home O2     2l per n/c at bedtime mostly,     Osteoarthritis     Type II diabetes mellitus, uncontrolled ([facility]     hx > 8 yrs    Unspecified sleep apnea    Dinorah Khan is a 67 y.o. female evaluated via telephone on 2020. Consent:  She and/or health care decision maker is aware that that she may receive a bill for this telephone service, depending on her insurance coverage, and has provided verbal consent to proceed: Yes  Patient accepts tele health phone visit and associated charges  Visit about 23 min     Documentation:  I communicated with the patient and/or health care decision maker . Details of this discussion including any medical advice provided: This visit was a telephone encounter. Patient was located at their home. Provider was located at their ___ home or        __x__ office.        I affirm this is a Patient Initiated Episode with an Established Patient who has not had a related appointment within my department in the past 7 days or scheduled within the next 24 hours. Total Time: minutes: 21-30 minutes    Note: not billable if this call serves to triage the patient into an appointment for the relevant concern      Erikmeirlatha Mcneil   Objective:   LMP  (LMP Unknown)     Physical Exam  No exam done  Assessment:       Diagnosis Orders   1. Iron deficiency anemia due to chronic blood loss  RAJENDRA Jones MD, Oncololgy, Valente Whitley MD, Gastroenterology, [place]    CBC Auto Differential   2. Anxiety     3. Major depressive disorder, recurrent, in full remission ([address]     4. Essential hypertension  Comprehensive Metabolic Panel   5. Acute on chronic diastolic heart failure ([address]     6. Chronic obstructive pulmonary disease, unspecified COPD type ([facility] [address]  Dawood Eaton MD, Pulmonology, [facility]         Plan:    No orders of the defined types were placed in this encounter.     Orders Placed This Encounter   Procedures    CBC Auto Differential     Standing Status:   Future     Standing Expiration Date:   1/4/2021    Comprehensive Metabolic Panel     Standing Status:   Future     Standing Expiration Date:   9/4/2021    RAJENDRA Jones MD, Deashlee Liao     Referral Priority:   Routine     Referral Type:   Eval and Treat     Referral Reason:   Specialty Services Required     Referred to Provider:   Rick Garrido MD     Requested Specialty:   Hematology and Oncology     Number of Visits Requested:   Renuka Tran MD, Gastroenterology, [facility]     Referral Priority:   Routine     Referral Type:   Eval and Treat     Referral Reason:   Specialty Services Required     Referred to Provider:   Laury Ba MD     Requested Specialty:   Gastroenterology     Number of Visits Requested:   Javier Vazquez MD, Pulmonology, [facility]     Referral Priority:   Routine     Referral Type:   Eval and Treat     Referral Reason:   Specialty Services Required     Referred to Provider:   Marivel Martinez MD     Requested Specialty:   Pulmonology     Number of Visits Requested:   1     f/u in 2 weeks after non fasting blood work -f/u with numerous specialists as already planned-stressed importance of follow up

## 2020-09-09 ENCOUNTER — CARE COORDINATION (OUTPATIENT)
Dept: CASE MANAGEMENT | Age: 75
End: 2020-09-09

## 2020-09-09 NOTE — CARE COORDINATION
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     Follow Up  Future Appointments   Date Time Provider Zaid Lr   2020 10:00 AM [facility]   10/5/2020  1:00 PM Juancarlos Ennis MD [address] [place] [address]

## 2020-09-09 NOTE — DISCHARGE SUMMARY
Hospital Medicine Discharge Summary    Samy Vargas  :  1945  MRN:  [id number]    Admit date:  2020  Discharge date: 2020    Admitting Physician: Princess Mercedes MD  Primary Care Physician:  Blanca Cancino MD    Samy Vargas is a 67 y.o. female that was admitted and treated at [facility] for the following medical issues:     Principal Problem:    Symptomatic anemia  Active Problems:    Uncontrolled type 2 diabetes mellitus with hyperglycemia (HCC)    Flash pulmonary edema (HCC)    Acute on chronic kidney failure (HCC)    Dysarthria    Chronic fatigue    Acute encephalopathy  Resolved Problems:    * No resolved hospital problems. *      Discharge Diagnoses:    Principal Problem:    Symptomatic anemia  Active Problems:    Uncontrolled type 2 diabetes mellitus with hyperglycemia (HCC)    Flash pulmonary edema (HCC)    Acute on chronic kidney failure (HCC)    Dysarthria    Chronic fatigue    Acute encephalopathy  Resolved Problems:    * No resolved hospital problems. *    Chief Complaint   Patient presents with    Flank Pain     right       Hospital Course:   Samy Vargas is a 67 y.o. female who was admitted to the hospital with altered mental status most likely secondary to vascular dementia. Waxing and waning symptoms have been present for months as per . Longstanding history of uncontrolled hypertension. Patient also has a longstanding history of hyperlipidemia and diabetes. Symptomatic anemia improved with PRBC transfusion and iron supplementation by IV. Agitation was very well controlled with Seroquel. Patient subsequently discharged with outpatient follow-up. Pt was discharge in a stable condition.        /63   Pulse 77   Temp 97.3 °F (36.3 °C) (Oral)   Resp 20   Ht 4' 11\" (1.499 m)   Wt 130 lb (59 kg)   LMP  (LMP Unknown)   SpO2 93%   BMI 26.26 kg/m²     Patient was seen by the following consultants while admitted to [facility] Shelia Espar[facility] 14[address]:   Consults:  IP CONSULT TO CARDIOLOGY  IP CONSULT TO NEUROLOGY  IP CONSULT TO HEM/ONC  IP CONSULT TO PSYCHIATRY  IP CONSULT TO ENDOCRINOLOGY    Significant Diagnostic Studies:    Refer to chart if  Ct Abdomen Pelvis Wo Contrast Additional Contrast? None    Result Date: 8/25/2020  EXAMINATION:  CT SCAN THE CHEST CLINICAL HISTORY:  Short of breath COMPARISON:  March 24, 2019 TECHNIQUE:  Multiple serial axial images of the chest from the base the neck through the upper abdomen with both sagittal coronal reconstruction was performed without intravenous or oral administration of contrast. FINDINGS:  There is a patchy mosaic appearance lung parenchyma that can be seen with small airways disease. There is bibasilar areas of atelectasis, scarring. No focal infiltrates. No effusions no pneumothoraces. No significant periaortic adenopathy. There is pretracheal adenopathy. There is multilevel degenerative changes with osteophytes of the thoracic spine. Unremarkable CT scan the chest as described above All CT scans at this facility use dose modulation, iterative reconstruction, and/or weight based dosing when appropriate to reduce radiation dose to as low as reasonably achievable. Examination: CT ABDOMEN PELVIS WO CONTRAST, CT CHEST WO CONTRAST Indication:   flank pain Technique: Multiple serial axial images was performed through the abdomen and pelvis without intravenous or oral administration of contrast..   Images were reconstructed in the axial and coronal and sagittal planes. Comparison: September 9, 2019 Findings: The liver, gallbladder, spleen, pancreas, adrenals,  are unremarkable. The kidneys show no significant perinephric stranding. No nephrolithiasis. No hydronephrosis or hydroureter. No bladder calculi. Large and small bowel show no sign of obstruction. The appendix is not visualized. No pericecal stranding. No diverticulitis. No free air. No free fluid.   The visualized abdominal aorta is of normal size and caliber. No significant retroperitoneal adenopathy. There is multilevel degenerative changes of lumbar spine. There is a grade 1 anterolisthesis of L4 and L5. There is narrowing of the L5-S1 disc space. Impression: UNREMARKABLE CT SCAN OF THE ABDOMEN AND PELVIS AS DESCRIBED ABOVE All CT scans at this facility use dose modulation, iterative reconstruction, and/or weight based dosing when appropriate to reduce radiation dose to as low as reasonably achievable. Xr Chest (2 Vw)    Result Date: 8/25/2020  EXAMINATION: XR CHEST (2 VW)  CLINICAL HISTORY:  sob COMPARISONS: None  FINDINGS: Two views of the chest are submitted. The cardiac silhouette is enlarged Pulmonary vascular congestion with increased interstitial markings. Right sided trachea. No focal infiltrates. No effusions. No Pneumothoraces. PULMONARY VASCULAR CONGESTION WITH INCREASED INTERSTITIAL MARKINGS. RADIOGRAPHIC FINDINGS COULD SUGGEST EARLY CHF. CHF. CORRELATE CLINICALLY    Ct Head Wo Contrast    Result Date: 8/25/2020  CT HEAD WO CONTRAST, CT HEAD WO CONTRAST CLINICAL HISTORY:  loss of coordination COMPARISON: July 7, 2019. TECHNIQUE: Multiple unenhanced serial axial images of the brain from the vertex of the skull to the base of the skull were performed. FINDINGS: The ventricles are dilated. Unchanged size configuration. .  No mass. No midline shift. The cisterns are patent. There are white matter and periventricular changes most likely consistent with chronic small vessel disease. Area of low-attenuation the left basal ganglia. Unchanged. Likely old lacunar infarct. Area of old infarct within the right posterior occipital subcortical White matter. No acute intra-axial or extra-axial findings. The visualized osseous structures are unremarkable. The visualized portion of the paranasal sinuses, and mastoids are unremarkable.      NO ACUTE INTRA-AXIAL OR EXTRA-AXIAL FINDINGS. IF SIGNS OR SYMPTOMS PERSIST THEN RECOMMEND MRI TO FURTHER EVALUATE IF THERE ARE NO CONTRAINDICATIONS All CT scans at this facility use dose modulation, iterative reconstruction, and/or weight based dosing when appropriate to reduce radiation dose to as low as reasonably achievable. CT HEAD WO CONTRAST, CT HEAD WO CONTRAST at 0349 hours. CLINICAL HISTORY: Confusion COMPARISON: August 24, 2020 2141 hours TECHNIQUE: Multiple unenhanced serial axial images of the brain from the vertex of the skull to the base of the skull were performed. FINDINGS: The ventricles are dilated. Unchanged size configuration. .  No mass. No midline shift. The cisterns are patent. There are white matter and periventricular changes most likely consistent with chronic small vessel disease. 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 Faint hypodensity seen in the right thalamus. Grossly unchanged as compared to prior studies dating back to July 7, 2019. Again note is made of old infarct within right occipital subcortical White matter. No acute intra-axial or extra-axial findings. The visualized osseous structures are unremarkable. The visualized portion of the paranasal sinuses, and mastoids are unremarkable. IMPRESSION: NO ACUTE INTRA-AXIAL OR EXTRA-AXIAL FINDINGS. IF SIGNS OR SYMPTOMS PERSIST THEN RECOMMEND MRI TO FURTHER EVALUATE IF THERE ARE NO CONTRAINDICATIONS All CT scans at this facility use dose modulation, iterative reconstruction, and/or weight based dosing when appropriate to reduce radiation dose to as low as reasonably achievable. Ct Head Wo Contrast    Result Date: 8/25/2020  CT HEAD WO CONTRAST, CT HEAD WO CONTRAST CLINICAL HISTORY:  loss of coordination COMPARISON: July 7, 2019. TECHNIQUE: Multiple unenhanced serial axial images of the brain from the vertex of the skull to the base of the skull were performed. FINDINGS: The ventricles are dilated. Unchanged size configuration. .  No mass. No midline shift. The cisterns are patent. There are white matter and periventricular changes most likely consistent with chronic small vessel disease. Area of low-attenuation the left basal ganglia. Unchanged. Likely old lacunar infarct. Area of old infarct within the right posterior occipital subcortical White matter. No acute intra-axial or extra-axial findings. The visualized osseous structures are unremarkable. The visualized portion of the paranasal sinuses, and mastoids are unremarkable. NO ACUTE INTRA-AXIAL OR EXTRA-AXIAL FINDINGS. IF SIGNS OR SYMPTOMS PERSIST THEN RECOMMEND MRI TO FURTHER EVALUATE IF THERE ARE NO CONTRAINDICATIONS All CT scans at this facility use dose modulation, iterative reconstruction, and/or weight based dosing when appropriate to reduce radiation dose to as low as reasonably achievable. CT HEAD WO CONTRAST, CT HEAD WO CONTRAST at 0349 hours. CLINICAL HISTORY: Confusion COMPARISON: August 24, 2020 2141 hours TECHNIQUE: Multiple unenhanced serial axial images of the brain from the vertex of the skull to the base of the skull were performed. FINDINGS: The ventricles are dilated. Unchanged size configuration. .  No mass. No midline shift. The cisterns are patent. There are white matter and periventricular changes most likely consistent with chronic small vessel disease. 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 Faint hypodensity seen in the right thalamus. Grossly unchanged as compared to prior studies dating back to July 7, 2019. Again note is made of old infarct within right occipital subcortical White matter. No acute intra-axial or extra-axial findings. The visualized osseous structures are unremarkable. The visualized portion of the paranasal sinuses, and mastoids are unremarkable. IMPRESSION: NO ACUTE INTRA-AXIAL OR EXTRA-AXIAL FINDINGS.  IF SIGNS OR SYMPTOMS PERSIST THEN RECOMMEND MRI TO FURTHER EVALUATE IF THERE ARE NO CONTRAINDICATIONS All CT scans at this facility use dose changes of lumbar spine. There is a grade 1 anterolisthesis of L4 and L5. There is narrowing of the L5-S1 disc space. Impression: UNREMARKABLE CT SCAN OF THE ABDOMEN AND PELVIS AS DESCRIBED ABOVE All CT scans at this facility use dose modulation, iterative reconstruction, and/or weight based dosing when appropriate to reduce radiation dose to as low as reasonably achievable. Xr Chest Portable    Result Date: 8/25/2020  EXAMINATION: XR CHEST PORTABLE CLINICAL HISTORY: SHORTNESS OF BREATH COMPARISONS: AUGUST 24, 2020, JUNE 29, 2020 FINDINGS: Osseous structures intact. Cardiopericardial silhouette upper limits of normal. Pulmonary vasculature indistinct. Mild blunting left costophrenic angle. BORDERLINE CARDIOMEGALY WITH INTERSTITIAL EDEMA. Us Carotid Artery Bilateral    Result Date: 8/26/2020  EXAMINATION:  CAROTID DUPLEX ULTRASONOGRAPHY CLINICAL HISTORY:  MENTAL STATUS CHANGES, CONFUSION COMPARISONS:  March 8, 2019 TECHNIQUE:  B-mode, color flow and spectral Doppler FINDINGS:  ARTERIAL BLOOD FLOW VELOCITY RIGHT PS                                               Prox CCA    92 cm/s             Mid CCA     99 cm/s              Dist CCA    78 cm/s              Prox ICA    94 cm/s               Mid ICA     80 cm/s            Dist ICA    232 cm/s             Prox ECA    70 cm/s             Prox VERT   92 cm/s              ICA/CCA     2.3                        LEFT PS Prox CCA    153 cm/s Mid CCA     101 cm/s Dist CCA    92 cm/s Prox ICA    93 cm/s Mid ICA     95 cm/s Dist ICA    146 cm/s Prox ECA    140 cm/s Prox VERT   71 cm/s ICA/CCA     1.4     SCATTERED ATHEROSCLEROSIS AND DIFFUSE INTIMAL THICKENING NOTED BILATERAL ICA AND COMMON CAROTIDS. BILATERAL ICAS ARE VERY TORTUOUS. BILATERAL ANTEGRADE VERTEBRAL FLOW RIGHT ICA BY VELOCITIES APPROXIMATELY 70% HOWEVER THIS MAY BE OVERESTIMATED DUE TO THE TORTUOSITY OF THE ICA. LEFT ICA 50-69% STENOSIS.        Discharge Medications:       Mireya Hilliard, [address] Medication Instructions [facility]:[phone]    Printed on:09/08/20 1613   Medication Information                      ACCU-CHEK PHYLLIS PLUS strip  Test 3x daily Dx E11.65             carvedilol (COREG) 25 MG tablet  Take 2 tablets by mouth 2 times daily             dicyclomine (BENTYL) 10 MG capsule  Take 1 capsule by mouth 4 times daily as needed (abdominal pain)             digoxin (LANOXIN) 125 MCG tablet  Take 1 tablet by mouth daily             furosemide (LASIX) 40 MG tablet  Take 1 tablet by mouth daily             hydrALAZINE (APRESOLINE) 100 MG tablet  Take 100 mg by mouth 2 times daily             insulin glargine (LANTUS) 100 UNIT/ML injection vial  Inject 45 Units into the skin nightly             insulin lispro (HUMALOG) 100 UNIT/ML injection vial  Inject 0-6 Units into the skin nightly             insulin lispro (HUMALOG) 100 UNIT/ML injection vial  Inject 12 Units into the skin 3 times daily (with meals)             ondansetron (ZOFRAN) 4 MG tablet  Take 1 tablet by mouth every 8 hours as needed for Nausea or Vomiting             pantoprazole (PROTONIX) 40 MG tablet  Take 40mg twice daily (1st dose 30min before breakfast) for 30 days. After 30 days, you make take 40mg once daily before breakfast.             PARoxetine (PAXIL) 40 MG tablet  TAKE 1 TABLET BY MOUTH ONCE DAILY IN THE MORNING             rosuvastatin (CRESTOR) 40 MG tablet  Take 1 tablet by mouth every evening             verapamil (CALAN SR) 120 MG extended release tablet  Take 1 tablet by mouth nightly                 Disposition:   If discharged to Home, Any [place] [id number] needs that were indicated and/or required as been addressed and set up by Social Work. Condition at discharge: Pt was medically stable at the time of discharge. Activity: activity as tolerated    Total time taken for discharging this patient: 40 minutes. Greater than 70% of time was spent focused exclusively on this patient.  Time was taken to review chart, discuss plans with

## 2020-09-15 ENCOUNTER — VIRTUAL VISIT (OUTPATIENT)
Dept: GASTROENTEROLOGY | Age: 75
End: 2020-09-15
Payer: MEDICARE

## 2020-09-15 PROCEDURE — 99443 PR PHYS/QHP TELEPHONE EVALUATION 21-30 MIN: CPT | Performed by: SPECIALIST

## 2020-09-15 RX ORDER — SODIUM, POTASSIUM,MAG SULFATES 17.5-3.13G
SOLUTION, RECONSTITUTED, ORAL ORAL
Qty: 1 BOTTLE | Refills: 0 | Status: ON HOLD | OUTPATIENT
Start: 2020-09-15 | End: 2020-09-29

## 2020-09-15 RX ORDER — BLOOD SUGAR DIAGNOSTIC
STRIP MISCELLANEOUS
Qty: 100 EACH | Refills: 3 | Status: SHIPPED | OUTPATIENT
Start: 2020-09-15 | End: 2020-10-22 | Stop reason: SDUPTHER

## 2020-09-15 RX ORDER — INSULIN GLARGINE 100 [IU]/ML
45 INJECTION, SOLUTION SUBCUTANEOUS NIGHTLY
Qty: 1 VIAL | Refills: 3 | Status: SHIPPED | OUTPATIENT
Start: 2020-09-15 | End: 2020-10-01 | Stop reason: SDUPTHER

## 2020-09-15 ASSESSMENT — ENCOUNTER SYMPTOMS
NAUSEA: 0
CONSTIPATION: 0
VOMITING: 0
RECTAL PAIN: 0
DIARRHEA: 0
EYES NEGATIVE: 1
ABDOMINAL DISTENTION: 0
GASTROINTESTINAL NEGATIVE: 1
RESPIRATORY NEGATIVE: 1
ANAL BLEEDING: 0
ABDOMINAL PAIN: 0
BLOOD IN STOOL: 0

## 2020-09-15 NOTE — PROGRESS NOTES
Gastroenterology Clinic Visit    Dinorah Khan  [id number]  Chief Complaint   Patient presents with    Consultation     Anemia. Notices a lot of fatigue, some dizziness. HPI: 67 y.o. female presents to the clinic with history of anemia. Patient had received transfusion when she was admitted with symptoms related to anemia. Patient had a push enteroscopy recently by Dr. Pastora Zurita which was unremarkable. Had a colonoscopy many years ago by Dr. Melia Pascual which reportedly showed polyps.,  Patient has been experiencing pain in the right lower quadrant area. Pain is intermittent, mild to moderate in severity, no nausea no vomiting, no history of any weight loss, stool is normal.  Has been feeling weak and tired, she of any rectal bleeding, no history of any significant weight loss, patient had a CT of the abdomen pelvis and chest recently which was unremarkable, no history of any oral ulcerations, has been on low-dose aspirin does not take any other antiplatelet agents. Social history does not smoke does not drink any alcohol. This was a telephone encounter and patient was at home and I was in our office, duration of phone call was 21 minutes  Review of Systems   Constitutional: Negative. Feels weak and tired   HENT: Negative. Eyes: Negative. Respiratory: Negative. Cardiovascular: Negative. Gastrointestinal: Negative. Negative for abdominal distention, abdominal pain, anal bleeding, blood in stool, constipation, diarrhea, nausea, rectal pain and vomiting. Endocrine: Negative. Genitourinary: Negative. Musculoskeletal: Negative. Skin: Negative. Allergic/Immunologic: Positive for food allergies. Neurological: Negative. Hematological: Negative. Psychiatric/Behavioral: Negative.          Past Medical History:   Diagnosis Date    Anxiety     Asthma     dx 2019 / has smoked since age 14    CHF (congestive heart failure) (HCC)     Chronic back pain     Bilateral L5 S1 Radic on emg--surprisingly worse on the left than the right--pt's symptoms and her MRI show worse on the right    Chronic obstructive pulmonary disease with acute exacerbation ([facility] [address] 10/12/2019    Depression     Fibromyalgia     Hyperlipidemia     meds > 8 yrs    Hypertension     meds > 45 yrs    On home O2     2l per n/c at bedtime mostly,     Osteoarthritis     Type II diabetes mellitus, uncontrolled ([address]     hx > 8 yrs    Unspecified sleep apnea       Past Surgical History:   Procedure Laterality Date    BACK SURGERY  2017    lumbar disc    CARDIAC CATHETERIZATION  11/3/14     DR. MIRELES / no stents    COLONOSCOPY  08/29/2016    w/polypectomy     DILATION AND CURETTAGE OF UTERUS N/A 10/7/2019    EUA HYSTEROSCOPY DILATATION AND CURETTAGE performed by Claudetta Sermon, DO at [facility]. [address], COLON, DIAGNOSTIC      EYE SURGERY      [place] with IOL OU / [address] [place]  [id number]    umbilical hernia repair    NC ESOPHAGOGASTRODUODENOSCOPY TRANSORAL DIAGNOSTIC N/A 3/24/2017    EGD ESOPHAGOGASTRODUODENOSCOPY performed by José Miguel Guzman MD at [facility] N/A 2/8/2018    negative findings    NC REVISE MEDIAN N/CARPAL TUNNEL SURG Left 6/5/2017    LEFT  CARPAL TUNNEL RELEASE performed by Pauline Mendoza MD at [facility],0+U/X,QAMAR N/A 2/8/2018    TONSILLECTOMY      as child    UPPER GASTROINTESTINAL ENDOSCOPY  08/26/2016    w/bx     UPPER GASTROINTESTINAL ENDOSCOPY N/A 2/25/2020    EGD possible biopsy performed by Parris Jc MD at [address] [place] [address] 7/1/2020    EGD PUSH ENTEROSCOPY performed by Madelin Hackett MD at [facility]     Current Outpatient Medications on File Prior to Visit   Medication Sig Dispense Refill    insulin glargine (LANTUS) 100 UNIT/ML injection vial Inject 45 Units into the skin nightly 1 vial 3    insulin lispro (HUMALOG) 100 UNIT/ML resource strain: Not on file    Food insecurity     Worry: Not on file     Inability: Not on file    Transportation needs     Medical: Not on file     Non-medical: Not on file   Tobacco Use    Smoking status: Former Smoker     Packs/day: 1.00     Years: 59.00     Pack years: 59.00     Types: Cigarettes     Start date: 11/30/2017    Smokeless tobacco: Never Used    Tobacco comment: quit 2-3 weeks ago    Substance and Sexual Activity    Alcohol use: Not Currently    Drug use: No    Sexual activity: Yes   Lifestyle    Physical activity     Days per week: Not on file     Minutes per session: Not on file    Stress: Not on file   Relationships    Social connections     Talks on phone: Not on file     Gets together: Not on file     Attends Yarsanism service: Not on file     Active member of club or organization: Not on file     Attends meetings of clubs or organizations: Not on file     Relationship status: Not on file    Intimate partner violence     Fear of current or ex partner: Not on file     Emotionally abused: Not on file     Physically abused: Not on file     Forced sexual activity: Not on file   Other Topics Concern    Not on file   Social History Narrative    Not on file       not currently breastfeeding.     Physical Exam    Laboratory, Pathology, Radiology reviewed indetail with relevant important investigations summarized below:  Lab Results   Component Value Date    WBC 6.7 09/11/2020    HGB 9.8 (L) 09/11/2020    HCT 31.4 (L) 09/11/2020    MCV 76.1 (L) 09/11/2020     (H) 09/11/2020     Lab Results   Component Value Date    ALT 60 (H) 08/28/2020    AST 42 (H) 08/28/2020    GGT 29 09/15/2018    ALKPHOS 125 08/28/2020    BILITOT 0.3 08/28/2020       Ct Abdomen Pelvis Wo Contrast Additional Contrast? None    Result Date: 8/25/2020  EXAMINATION:  CT SCAN THE CHEST CLINICAL HISTORY:  Short of breath COMPARISON:  March 24, 2019 TECHNIQUE:  Multiple serial axial images of the chest from the base the neck through the upper abdomen with both sagittal coronal reconstruction was performed without intravenous or oral administration of contrast. FINDINGS:  There is a patchy mosaic appearance lung parenchyma that can be seen with small airways disease. There is bibasilar areas of atelectasis, scarring. No focal infiltrates. No effusions no pneumothoraces. No significant periaortic adenopathy. There is pretracheal adenopathy. There is multilevel degenerative changes with osteophytes of the thoracic spine. Unremarkable CT scan the chest as described above All CT scans at this facility use dose modulation, iterative reconstruction, and/or weight based dosing when appropriate to reduce radiation dose to as low as reasonably achievable. Examination: CT ABDOMEN PELVIS WO CONTRAST, CT CHEST WO CONTRAST Indication:   flank pain Technique: Multiple serial axial images was performed through the abdomen and pelvis without intravenous or oral administration of contrast..   Images were reconstructed in the axial and coronal and sagittal planes. Comparison: September 9, 2019 Findings: The liver, gallbladder, spleen, pancreas, adrenals,  are unremarkable. The kidneys show no significant perinephric stranding. No nephrolithiasis. No hydronephrosis or hydroureter. No bladder calculi. Large and small bowel show no sign of obstruction. The appendix is not visualized. No pericecal stranding. No diverticulitis. No free air. No free fluid. The visualized abdominal aorta is of normal size and caliber. No significant retroperitoneal adenopathy. There is multilevel degenerative changes of lumbar spine. There is a grade 1 anterolisthesis of L4 and L5. There is narrowing of the L5-S1 disc space.  Impression: UNREMARKABLE CT SCAN OF THE ABDOMEN AND PELVIS AS DESCRIBED ABOVE All CT scans at this facility use dose modulation, iterative reconstruction, and/or weight based dosing when appropriate to reduce radiation dose to as low as reasonably achievable. Xr Chest (2 Vw)    Result Date: 8/25/2020  EXAMINATION: XR CHEST (2 VW)  CLINICAL HISTORY:  sob COMPARISONS: None  FINDINGS: Two views of the chest are submitted. The cardiac silhouette is enlarged Pulmonary vascular congestion with increased interstitial markings. Right sided trachea. No focal infiltrates. No effusions. No Pneumothoraces. PULMONARY VASCULAR CONGESTION WITH INCREASED INTERSTITIAL MARKINGS. RADIOGRAPHIC FINDINGS COULD SUGGEST EARLY CHF. CHF. CORRELATE CLINICALLY    Ct Head Wo Contrast    Result Date: 8/25/2020  CT HEAD WO CONTRAST, CT HEAD WO CONTRAST CLINICAL HISTORY:  loss of coordination COMPARISON: July 7, 2019. TECHNIQUE: Multiple unenhanced serial axial images of the brain from the vertex of the skull to the base of the skull were performed. FINDINGS: The ventricles are dilated. Unchanged size configuration. .  No mass. No midline shift. The cisterns are patent. There are white matter and periventricular changes most likely consistent with chronic small vessel disease. Area of low-attenuation the left basal ganglia. Unchanged. Likely old lacunar infarct. Area of old infarct within the right posterior occipital subcortical White matter. No acute intra-axial or extra-axial findings. The visualized osseous structures are unremarkable. The visualized portion of the paranasal sinuses, and mastoids are unremarkable. NO ACUTE INTRA-AXIAL OR EXTRA-AXIAL FINDINGS. IF SIGNS OR SYMPTOMS PERSIST THEN RECOMMEND MRI TO FURTHER EVALUATE IF THERE ARE NO CONTRAINDICATIONS All CT scans at this facility use dose modulation, iterative reconstruction, and/or weight based dosing when appropriate to reduce radiation dose to as low as reasonably achievable. CT HEAD WO CONTRAST, CT HEAD WO CONTRAST at 0349 hours.  CLINICAL HISTORY: Confusion COMPARISON: August 24, 2020 2141 hours TECHNIQUE: Multiple sinuses, and mastoids are unremarkable. NO ACUTE INTRA-AXIAL OR EXTRA-AXIAL FINDINGS. IF SIGNS OR SYMPTOMS PERSIST THEN RECOMMEND MRI TO FURTHER EVALUATE IF THERE ARE NO CONTRAINDICATIONS All CT scans at this facility use dose modulation, iterative reconstruction, and/or weight based dosing when appropriate to reduce radiation dose to as low as reasonably achievable. CT HEAD WO CONTRAST, CT HEAD WO CONTRAST at 0349 hours. CLINICAL HISTORY: Confusion COMPARISON: August 24, 2020 2141 hours TECHNIQUE: Multiple unenhanced serial axial images of the brain from the vertex of the skull to the base of the skull were performed. FINDINGS: The ventricles are dilated. Unchanged size configuration. .  No mass. No midline shift. The cisterns are patent. There are white matter and periventricular changes most likely consistent with chronic small vessel disease. 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 Faint hypodensity seen in the right thalamus. Grossly unchanged as compared to prior studies dating back to July 7, 2019. Again note is made of old infarct within right occipital subcortical White matter. No acute intra-axial or extra-axial findings. The visualized osseous structures are unremarkable. The visualized portion of the paranasal sinuses, and mastoids are unremarkable. IMPRESSION: NO ACUTE INTRA-AXIAL OR EXTRA-AXIAL FINDINGS. IF SIGNS OR SYMPTOMS PERSIST THEN RECOMMEND MRI TO FURTHER EVALUATE IF THERE ARE NO CONTRAINDICATIONS All CT scans at this facility use dose modulation, iterative reconstruction, and/or weight based dosing when appropriate to reduce radiation dose to as low as reasonably achievable.      Ct Chest Wo Contrast    Result Date: 8/25/2020  EXAMINATION:  CT SCAN THE CHEST CLINICAL HISTORY:  Short of breath COMPARISON:  March 24, 2019 TECHNIQUE:  Multiple serial axial images of the chest from the base the neck through the upper abdomen with both sagittal coronal reconstruction was performed without intravenous or oral administration of contrast. FINDINGS:  There is a patchy mosaic appearance lung parenchyma that can be seen with small airways disease. There is bibasilar areas of atelectasis, scarring. No focal infiltrates. No effusions no pneumothoraces. No significant periaortic adenopathy. There is pretracheal adenopathy. There is multilevel degenerative changes with osteophytes of the thoracic spine. Unremarkable CT scan the chest as described above All CT scans at this facility use dose modulation, iterative reconstruction, and/or weight based dosing when appropriate to reduce radiation dose to as low as reasonably achievable. Examination: CT ABDOMEN PELVIS WO CONTRAST, CT CHEST WO CONTRAST Indication:   flank pain Technique: Multiple serial axial images was performed through the abdomen and pelvis without intravenous or oral administration of contrast..   Images were reconstructed in the axial and coronal and sagittal planes. Comparison: September 9, 2019 Findings: The liver, gallbladder, spleen, pancreas, adrenals,  are unremarkable. The kidneys show no significant perinephric stranding. No nephrolithiasis. No hydronephrosis or hydroureter. No bladder calculi. Large and small bowel show no sign of obstruction. The appendix is not visualized. No pericecal stranding. No diverticulitis. No free air. No free fluid. The visualized abdominal aorta is of normal size and caliber. No significant retroperitoneal adenopathy. There is multilevel degenerative changes of lumbar spine. There is a grade 1 anterolisthesis of L4 and L5. There is narrowing of the L5-S1 disc space. Impression: UNREMARKABLE CT SCAN OF THE ABDOMEN AND PELVIS AS DESCRIBED ABOVE All CT scans at this facility use dose modulation, iterative reconstruction, and/or weight based dosing when appropriate to reduce radiation dose to as low as reasonably achievable.     Mri Lumbar Spine Wo Contrast    Result Date: 8/28/2020  Examination: MRI LUMBAR SPINE WO CONTRAST History: Back pain Technique: Multiplanar multisequence MRI of the lumbar spine was obtained without intravenous contrast. Comparison: Lumbar spine MRI from March 15, 2015 and CT abdomen pelvis from August 24, 2020 Findings: The conus medullaris ends normally. The alignment of the lumbar spine is anatomic. The vertebral body heights are well maintained. Degenerative endplate changes with spurring at L4-5 and L5-S1 as well as T11-12. Disc desiccation throughout the lumbar spine. Moderate intervertebral disc height loss at L5-S1. T11-12: Small disc bulge seen on the sagittal view. This level was not included on the axial images however on the sagittal images there appears to be moderate if not severe right neuroforaminal stenosis at this level. No high-grade spinal canal stenosis  at this level. L1-L2: No significant disc bulge, spinal canal or neuroforaminal stenosis. L2-L3: Small disc bulge. Ligamentum flavum thickening. Mild spinal canal stenosis. Mild bilateral neuroforaminal stenosis. L3-L4: Small disc bulge. Mild facet arthropathy. Ligamentum flavum thickening. Moderate spinal canal stenosis. Moderate left and mild right neuroforaminal stenosis. L4-L5: Small disc bulge. Moderate facet arthropathy. Severe left and moderate right neuroforaminal stenosis. No spinal canal stenosis. L5-S1: Small disc bulge. Mild right and moderate left facet arthropathy. Severe bilateral neuroforaminal stenosis, left greater than right. No spinal canal stenosis. The Visualized paravertebral soft tissues are grossly unremarkable. Multilevel degenerative changes of the lumbar spine as detailed. Moderate spinal canal stenosis at L3-L4. Multilevel high-grade neuroforaminal stenosis as detailed. Xr Chest Portable    Result Date: 8/25/2020  EXAMINATION: XR CHEST PORTABLE CLINICAL HISTORY: SHORTNESS OF BREATH COMPARISONS: AUGUST 24, 2020, JUNE 29, 2020 FINDINGS: Osseous structures intact.  Cardiopericardial silhouette upper limits of normal. Pulmonary vasculature indistinct. Mild blunting left costophrenic angle. BORDERLINE CARDIOMEGALY WITH INTERSTITIAL EDEMA. Mri Brain Wo Contrast    Result Date: 8/28/2020  MRI BRAIN WO CONTRAST : 8/27/2020 5:32 PM CLINICAL HISTORY:  encephalopathy . Altered mental status, confusion, agitated. COMPARISON: Head CT 8/25/2020. TECHNIQUE: Multiplanar MR imaging of the head was performed with shortened sequences secondary to the patient's medical condition. FINDINGS: There is no acute infarct, intracranial hemorrhage, mass effect, midline shift, extra-axial collection, hydrocephalus, or other acute process identified. Moderate generalized cerebral volume loss is present, with moderate predominantly supratentorial white matter changes most consistent with chronic small vessel ischemic disease. A few old lacunar infarcts are noted within the periventricular white matter and the naomi to the left of midline. NO ACUTE INTRACRANIAL PROCESS IDENTIFIED. MODERATE ATROPHY, INVOLUTIONAL CHANGES, AND SMALL OLD LACUNAR INFARCTS.       Us Carotid Artery Bilateral    Result Date: 8/26/2020  EXAMINATION:  CAROTID DUPLEX ULTRASONOGRAPHY CLINICAL HISTORY:  MENTAL STATUS CHANGES, CONFUSION COMPARISONS:  March 8, 2019 TECHNIQUE:  B-mode, color flow and spectral Doppler FINDINGS:  ARTERIAL BLOOD FLOW VELOCITY RIGHT PS                                               Prox CCA    92 cm/s             Mid CCA     99 cm/s              Dist CCA    78 cm/s              Prox ICA    94 cm/s               Mid ICA     80 cm/s            Dist ICA    232 cm/s             Prox ECA    70 cm/s             Prox VERT   92 cm/s              ICA/CCA     2.3                        LEFT PS Prox CCA    153 cm/s Mid CCA     101 cm/s Dist CCA    92 cm/s Prox ICA    93 cm/s Mid ICA     95 cm/s Dist ICA    146 cm/s Prox ECA    140 cm/s Prox VERT   71 cm/s ICA/CCA     1.4     SCATTERED ATHEROSCLEROSIS AND DIFFUSE INTIMAL THICKENING NOTED BILATERAL ICA AND COMMON CAROTIDS. BILATERAL ICAS ARE VERY TORTUOUS. BILATERAL ANTEGRADE VERTEBRAL FLOW RIGHT ICA BY VELOCITIES APPROXIMATELY 70% HOWEVER THIS MAY BE OVERESTIMATED DUE TO THE TORTUOSITY OF THE ICA. LEFT ICA 50-69% STENOSIS. Endoscopic investigations:     Assessmentand Plan:  67 y.o. female with history of microcytic anemia.,  Recent push enteroscopy was unremarkable. We will schedule the patient for a colonoscopy and if that is negative will need capsule endoscopy. Diagnosis Orders   1. Iron deficiency anemia, unspecified iron deficiency anemia type  Endoscopy, colon, diagnostic     No follow-ups on file. Jb Chun MD   Staff Gastroenterologist  [facility]    Please note this report has been partially produced using speech recognition software and may cause contain errors related to thatsystem including grammar, punctuation and spelling as well as words and phrases that may seem inappropriate. If there are questions or concerns please feel free to contact me to clarify.

## 2020-09-18 ENCOUNTER — TELEPHONE (OUTPATIENT)
Dept: FAMILY MEDICINE CLINIC | Age: 75
End: 2020-09-18

## 2020-09-18 NOTE — TELEPHONE ENCOUNTER
lmom for pt to call and schedule f/u appt from prior visit and complete bloodwork first;can be first week of October and VV

## 2020-09-22 ENCOUNTER — CARE COORDINATION (OUTPATIENT)
Dept: CASE MANAGEMENT | Age: 75
End: 2020-09-22

## 2020-09-22 ENCOUNTER — VIRTUAL VISIT (OUTPATIENT)
Dept: FAMILY MEDICINE CLINIC | Age: 75
End: 2020-09-22
Payer: MEDICARE

## 2020-09-22 PROCEDURE — 99442 PR PHYS/QHP TELEPHONE EVALUATION 11-20 MIN: CPT | Performed by: FAMILY MEDICINE

## 2020-09-22 ASSESSMENT — ENCOUNTER SYMPTOMS: VOMITING: 0

## 2020-09-22 NOTE — PROGRESS NOTES
Subjective:      Patient ID: Lavon Guo is a 67 y.o. female    HPI  Here in follow for anemia. Did see gi and getting colonoscopy done at end of the month. Still having problems with fatigue according to her and      Review of Systems   Constitutional: Negative for chills and fever. Gastrointestinal: Negative for vomiting. Skin: Negative for rash. Lavon Guo is a 67 y.o. female evaluated via telephone on 9/22/2020. Consent:  She and/or health care decision maker is aware that that she may receive a bill for this telephone service, depending on her insurance coverage, and has prePatient accepts tele health phone visit and associated charges  Visit about 14 min     Documentation:  I communicated with the patient and/or health care decision maker . Details of this discussion including any medical advice provided: This visit was a telephone encounter. Patient was located at their home. Provider was located at their ___x home or        ____ office. I affirm this is a Patient Initiated Episode with an Established Patient who has not had a related appointment within my department in the past 7 days or scheduled within the next 24 hours.     Total Time: minutes: 11-20 minutes    Note: not billable if this call serves to triage the patient into an appointment for the relevant concern      Osmar Salvador   Reviewed allergy, medical, social, surgical, family and med list changes and updated   Files     Social History     Socioeconomic History    Marital status:      Spouse name: None    Number of children: None    Years of education: None    Highest education level: None   Occupational History    Occupation: Retired-   Social Needs    Financial resource strain: None    Food insecurity     Worry: None     Inability: None    Transportation needs     Medical: None     Non-medical: None   Tobacco Use    Smoking status: Former Smoker     Packs/day: 1.00 pantoprazole (PROTONIX) 40 MG tablet Take 40mg twice daily (1st dose 30min before breakfast) for 30 days. After 30 days, you make take 40mg once daily before breakfast. 60 tablet 3    furosemide (LASIX) 40 MG tablet Take 1 tablet by mouth daily 90 tablet 0    dicyclomine (BENTYL) 10 MG capsule Take 1 capsule by mouth 4 times daily as needed (abdominal pain) 120 capsule 3    ondansetron (ZOFRAN) 4 MG tablet Take 1 tablet by mouth every 8 hours as needed for Nausea or Vomiting 30 tablet 2     No current facility-administered medications for this visit. Family History   Problem Relation Age of Onset    Heart Disease Father         cardiac bypass    Arthritis Father     Arthritis Mother     Other Mother          at age 80    Other Sister         [facility]    No Known Problems Daughter     Stroke Son      Past Medical History:   Diagnosis Date    Anxiety     Asthma     dx  / has smoked since age 14    CHF (congestive heart failure) ([facility])     Chronic back pain     Bilateral L5 S1 Radic on emg--surprisingly worse on the left than the right--pt's symptoms and her MRI show worse on the right    Chronic obstructive pulmonary disease with acute exacerbation ([facility]) 10/12/2019    Depression     Fibromyalgia     Hyperlipidemia     meds > 8 yrs    Hypertension     meds > 39 yrs    On home O2     2l per n/c at bedtime mostly,     Osteoarthritis     Type II diabetes mellitus, uncontrolled ([facility])     hx > 8 yrs    Unspecified sleep apnea      Objective:   LMP  (LMP Unknown)     Physical Exam  No exam done  Assessment:       Diagnosis Orders   1. Iron deficiency anemia due to chronic blood loss     2.  Other fatigue           Plan:      Continue current meds   Continue follow up with multiple specialists as already planned   F/u after non fasting blood work in 4 weeks

## 2020-09-22 NOTE — CARE COORDINATION
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 CTN s/o. Care Transitions Subsequent and Final Call    Subsequent and Final Calls  Care Transitions Interventions  Other Interventions:             Follow Up  Future Appointments   Date Time Provider Zaid Lr   2020 11:30 AM Gini Landa FLU [facility]   10/1/2020  4:30 PM Lyle Jimenez MD [address] [place] [address]   10/5/2020  1:00 PM Anayeli Steinberg MD [address] [place] [address]

## 2020-09-23 ENCOUNTER — NURSE ONLY (OUTPATIENT)
Dept: PRIMARY CARE CLINIC | Age: 75
End: 2020-09-23

## 2020-09-25 LAB
SARS-COV-2: NOT DETECTED
SOURCE: NORMAL

## 2020-09-28 ENCOUNTER — ANESTHESIA EVENT (OUTPATIENT)
Dept: ENDOSCOPY | Age: 75
End: 2020-09-28
Payer: MEDICARE

## 2020-09-28 ASSESSMENT — ENCOUNTER SYMPTOMS: SHORTNESS OF BREATH: 1

## 2020-09-28 NOTE — ANESTHESIA PRE PROCEDURE
Department of Anesthesiology  Preprocedure Note       Name:  Dinorah Khan   Age:  67 y.o.  :  1945                                          MRN:  [id number]         Date:  2020      Surgeon: Roberto Arguelles):  Laury Ba MD    Procedure: Procedure(s):  COLONOSCOPY DIAGNOSTIC    Medications prior to admission:   Prior to Admission medications    Medication Sig Start Date End Date Taking? Authorizing Provider   ACCU-CHEK PHYLLIS PLUS strip Test 3x daily Dx E11.65 9/15/20   Matthew Romo MD   insulin glargine (LANTUS) 100 UNIT/ML injection vial Inject 45 Units into the skin nightly 9/15/20   Matthew Romo MD   Na Sulfate-K Sulfate-Mg Sulf 17.5-3.13-1.6 GM/177ML SOLN As directed 9/15/20   Laury aB MD   insulin lispro (HUMALOG) 100 UNIT/ML injection vial Inject 12 Units into the skin 3 times daily (with meals) 20   Matthew Romo MD   rosuvastatin (CRESTOR) 40 MG tablet Take 1 tablet by mouth every evening 20   Ashley Mcneil MD   carvedilol (COREG) 25 MG tablet Take 2 tablets by mouth 2 times daily 7/3/20   Jaime Almonte DO   verapamil (CALAN SR) 120 MG extended release tablet Take 1 tablet by mouth nightly 7/3/20   Jaime Almonte DO   PARoxetine (PAXIL) 40 MG tablet TAKE 1 TABLET BY MOUTH ONCE DAILY IN THE MORNING  Patient taking differently: Take 40 mg by mouth every morning TAKE 1 TABLET BY MOUTH ONCE DAILY IN THE MORNING 20   Ashley Mcneil MD   digoxin (LANOXIN) 125 MCG tablet Take 1 tablet by mouth daily 20   Johnny Cortez MD   hydrALAZINE (APRESOLINE) 100 MG tablet Take 100 mg by mouth 2 times daily    Historical Provider, MD   pantoprazole (PROTONIX) 40 MG tablet Take 40mg twice daily (1st dose 30min before breakfast) for 30 days.  After 30 days, you make take 40mg once daily before breakfast. 20   Altamese Plane, DO   furosemide (LASIX) 40 MG tablet Take 1 tablet by mouth daily 20   Ashley Mcneil MD   dicyclomine (BENTYL) 10 MG capsule Take 1 capsule by mouth 4 times daily as needed (abdominal pain) 12/9/19   JOHNATHON Daniels CNP   ondansetron [facility]) 4 MG tablet Take 1 tablet by mouth every 8 hours as needed for Nausea or Vomiting 12/9/19   JOHNATHON Daniels CNP       Current medications:    No current facility-administered medications for this encounter. Current Outpatient Medications   Medication Sig Dispense Refill    ACCU-CHEK PHYLLIS PLUS strip Test 3x daily Dx E11.65 100 each 3    insulin glargine (LANTUS) 100 UNIT/ML injection vial Inject 45 Units into the skin nightly 1 vial 3    Na Sulfate-K Sulfate-Mg Sulf 17.5-3.13-1.6 GM/177ML SOLN As directed 1 Bottle 0    insulin lispro (HUMALOG) 100 UNIT/ML injection vial Inject 12 Units into the skin 3 times daily (with meals) 1 vial 3    rosuvastatin (CRESTOR) 40 MG tablet Take 1 tablet by mouth every evening 90 tablet 0    carvedilol (COREG) 25 MG tablet Take 2 tablets by mouth 2 times daily 60 tablet 3    verapamil (CALAN SR) 120 MG extended release tablet Take 1 tablet by mouth nightly 30 tablet 3    PARoxetine (PAXIL) 40 MG tablet TAKE 1 TABLET BY MOUTH ONCE DAILY IN THE MORNING (Patient taking differently: Take 40 mg by mouth every morning TAKE 1 TABLET BY MOUTH ONCE DAILY IN THE MORNING) 90 tablet 0    digoxin (LANOXIN) 125 MCG tablet Take 1 tablet by mouth daily 90 tablet 1    hydrALAZINE (APRESOLINE) 100 MG tablet Take 100 mg by mouth 2 times daily      pantoprazole (PROTONIX) 40 MG tablet Take 40mg twice daily (1st dose 30min before breakfast) for 30 days.  After 30 days, you make take 40mg once daily before breakfast. 60 tablet 3    furosemide (LASIX) 40 MG tablet Take 1 tablet by mouth daily 90 tablet 0    dicyclomine (BENTYL) 10 MG capsule Take 1 capsule by mouth 4 times daily as needed (abdominal pain) 120 capsule 3    ondansetron (ZOFRAN) 4 MG tablet Take 1 tablet by mouth every 8 hours as needed for Nausea or Vomiting 30 tablet 2       Allergies: Allergies   Allergen Reactions    Ibuprofen Nausea Only    Metformin And Related      Diarrhea      Darvon [Propoxyphene Hcl] Nausea And Vomiting       Problem List:    Patient Active Problem List   Diagnosis Code    Hypertension I10    Hyperlipidemia E78.5    Uncontrolled type 2 diabetes mellitus with complication, without long-term current use of insulin ([facility]) E11.8, E11.65    Fibromyalgia M79.7    Anxiety F41.9    Smoker F17.200    Insomnia G47.00    DJD (degenerative joint disease), lumbar M47.816    Lumbosacral radiculopathy at S1 M54.17    DDD (degenerative disc disease), lumbar M51.36    Dysphonia R49.0    CTS (carpal tunnel syndrome) G56.00    High risk medication use-Norco - 12/20/17 [facility] PM&R, 02/20/18 [facility] PM&R, 03/07/18 [facility] PM&R, Urine Drug screen negative 02/06/17 PM&R--MED CONTRACT 2/6/17 Z79.899    SOB (shortness of breath) on exertion R06.02    Chest pain R07.9    Memory deficit R41.3    Artificial lens present Z96.1    Presbyopia H52.4    Astigmatism, regular H52.229    Cataract, nuclear sclerotic senile H25.10    IDDM (insulin dependent diabetes mellitus) ([facility]) E11.9, Z79.4    Regular astigmatism H52.229    Nuclear senile cataract H25.10    Neck pain M54.2    Lumbosacral radiculopathy at L5 M54.17    Spinal stenosis of lumbar region with neurogenic claudication M48.062    Impaired mobility and activities of daily living Z74.09    Non-compliant patient NO showed FU 1/10/17 Dr George Hernandez Z91.19    Insomnia secondary to chronic pain G89.29, G47.01    Reactive depression F32.9    Diabetic radiculopathy ([facility]) E11.49, M54.10    Cervical radicular pain M54.12    Diabetic asymmetric polyneuropathy ([facility]) E11.42    Myalgia M79.10    Intercostal neuropathy G58.0    Osteoarthritis of spine with radiculopathy, lumbar region M47.26    Acute combined systolic and diastolic CHF, NYHA class 1 ([facility]) I50.41    PMB (postmenopausal bleeding) N95.0    Acute on chronic diastolic heart failure (HCC) I50.33    CHF (congestive heart failure) ([facility]) I50.9    Hypertensive urgency I16.0    Uncontrolled type 2 diabetes mellitus with hyperglycemia (HCC) E11.65    Chronic obstructive pulmonary disease with acute exacerbation ([facility]) J44.1    Acute on chronic diastolic (congestive) heart failure (HCC) I50.33    SAÚL (acute kidney injury) ([facility]) N17.9    Hypoglycemia E16.2    HOCM (hypertrophic obstructive cardiomyopathy) ([facility]) I42.1    Gastrointestinal hemorrhage K92.2    COPD exacerbation ([facility]) J44.1    Anemia D64.9    AVM (arteriovenous malformation) Q27.30    Symptomatic anemia D64.9    Flash pulmonary edema ([facility]) J81.0    Acute on chronic kidney failure (HCC) N17.9, N18.9    Dysarthria R47.1    Chronic fatigue R53.82    Acute encephalopathy G93.40       Past Medical History:        Diagnosis Date    Anxiety     Asthma     dx 2019 / has smoked since age 11    CHF (congestive heart failure) ([facility])     Chronic back pain     Bilateral L5 S1 Radic on emg--surprisingly worse on the left than the right--pt's symptoms and her MRI show worse on the right    Chronic obstructive pulmonary disease with acute exacerbation ([facility] [address] 10/12/2019    Depression     Fibromyalgia     Hyperlipidemia     meds > 8 yrs    Hypertension     meds > 45 yrs    On home O2     2l per n/c at bedtime mostly,     Osteoarthritis     Type II diabetes mellitus, uncontrolled ([facility])     hx > 8 yrs    Unspecified sleep apnea        Past Surgical History:        Procedure Laterality Date    BACK SURGERY  2017    lumbar disc    CARDIAC CATHETERIZATION  11/3/14     DR. MIRELES / no stents    COLONOSCOPY  08/29/2016    w/polypectomy     DILATION AND CURETTAGE OF UTERUS N/A 10/7/2019    EUA HYSTEROSCOPY DILATATION AND CURETTAGE performed by Jose Powell DO at [address] [place] [address] ENDOSCOPY, COLON, DIAGNOSTIC      EYE SURGERY      [place] with IOL OU / [address]    umbilical hernia repair    GA ESOPHAGOGASTRODUODENOSCOPY TRANSORAL DIAGNOSTIC N/A 3/24/2017    EGD ESOPHAGOGASTRODUODENOSCOPY performed by Philipp Lundborg, MD at [facility] N/A 2/8/2018    negative findings    GA REVISE MEDIAN N/CARPAL TUNNEL SURG Left 6/5/2017    LEFT  CARPAL TUNNEL RELEASE performed by Lor Hendricks MD at [facility],4+I/A,JDGVL N/A 2/8/2018    TONSILLECTOMY      as child    UPPER GASTROINTESTINAL ENDOSCOPY  08/26/2016    w/bx     UPPER GASTROINTESTINAL ENDOSCOPY N/A 2/25/2020    EGD possible biopsy performed by Nohemy Edgar MD at [address] [place] [address] 7/1/2020    EGD PUSH ENTEROSCOPY performed by Makayla Ortega MD at [facility]       Social History:    Social History     Tobacco Use    Smoking status: Former Smoker     Packs/day: 1.00     Years: 59.00     Pack years: 59.00     Types: Cigarettes     Start date: 11/30/2017    Smokeless tobacco: Never Used    Tobacco comment: quit 2-3 weeks ago    Substance Use Topics    Alcohol use: Not Currently                                Counseling given: Not Answered  Comment: quit 2-3 weeks ago       Vital Signs (Current): There were no vitals filed for this visit.                                            BP Readings from Last 3 Encounters:   08/28/20 136/63   07/08/20 (!) 122/58   07/03/20 (!) 136/48       NPO Status:                                                                                 BMI:   Wt Readings from Last 3 Encounters:   08/24/20 130 lb (59 kg)   07/08/20 132 lb (59.9 kg)   07/03/20 115 lb 11.2 oz (52.5 kg)     There is no height or weight on file to calculate BMI.    CBC:   Lab Results   Component Value Date    WBC 6.7 09/11/2020    RBC 4.13 09/11/2020    HGB 9.8 09/11/2020    HCT 31.4 09/11/2020    MCV 76.1 09/11/2020    RDW 26.3 09/11/2020     09/11/2020       CMP:   Lab Results   Component Value Date     09/11/2020    K 3.9 09/11/2020    K 4.6 07/03/2020     09/11/2020    CO2 27 09/11/2020    BUN 19 09/11/2020    CREATININE 1.93 09/11/2020    GFRAA 30.5 09/11/2020    LABGLOM 25.2 09/11/2020    GLUCOSE 346 09/11/2020    PROT 6.0 08/28/2020    CALCIUM 9.4 09/11/2020    BILITOT 0.3 08/28/2020    ALKPHOS 125 08/28/2020    AST 42 08/28/2020    ALT 60 08/28/2020       POC Tests: No results for input(s): POCGLU, POCNA, POCK, POCCL, POCBUN, POCHEMO, POCHCT in the last 72 hours. Coags:   Lab Results   Component Value Date    PROTIME 13.2 08/25/2020    INR 1.0 08/25/2020    APTT 27.8 08/25/2020       HCG (If Applicable): No results found for: PREGTESTUR, PREGSERUM, HCG, HCGQUANT     ABGs:   Lab Results   Component Value Date    PHART 7.398 08/27/2020    PO2ART 94 08/27/2020    RQT2DHY 37 08/27/2020    WAK0VAH 23.0 08/27/2020    BEART -2 08/27/2020    I7RRUJWU 97 08/27/2020        Type & Screen (If Applicable):  No results found for: LABABO, LABRH    Drug/Infectious Status (If Applicable):  No results found for: HIV, HEPCAB    COVID-19 Screening (If Applicable):   Lab Results   Component Value Date    COVID19 Not Detected 09/23/2020         Anesthesia Evaluation    Airway: Mallampati: II  TM distance: >3 FB   Neck ROM: full  Mouth opening: > = 3 FB Dental:          Pulmonary:normal exam    (+) COPD:  shortness of breath:  sleep apnea:  asthma:                            Cardiovascular:    (+) hypertension:, CHF:, MENDEZ:, hyperlipidemia        Rhythm: regular  Rate: normal  Echocardiogram reviewed                  Neuro/Psych:   (+) neuromuscular disease:, psychiatric history:            GI/Hepatic/Renal:   (+) bowel prep,           Endo/Other:    (+) Diabetes, blood dyscrasia: anemia:., .                 Abdominal:           Vascular: negative vascular ROS. Anesthesia Plan      MAC     ASA 3       Induction: intravenous.       Anesthetic plan and risks discussed with patient. Plan discussed with attending.                   JOHNATHON Robles - CRNA   9/28/2020

## 2020-09-29 ENCOUNTER — HOSPITAL ENCOUNTER (OUTPATIENT)
Age: 75
Setting detail: OUTPATIENT SURGERY
Discharge: HOME | End: 2020-09-29
Attending: SPECIALIST | Admitting: SPECIALIST
Payer: MEDICARE

## 2020-09-29 ENCOUNTER — ANCILLARY PROCEDURE (OUTPATIENT)
Dept: ENDOSCOPY | Age: 75
End: 2020-09-29
Attending: SPECIALIST
Payer: MEDICARE

## 2020-09-29 ENCOUNTER — ANESTHESIA (OUTPATIENT)
Dept: ENDOSCOPY | Age: 75
End: 2020-09-29
Payer: MEDICARE

## 2020-09-29 VITALS
HEART RATE: 77 BPM | WEIGHT: 130 LBS | BODY MASS INDEX: 26.21 KG/M2 | OXYGEN SATURATION: 98 % | SYSTOLIC BLOOD PRESSURE: 158 MMHG | HEIGHT: 59 IN | DIASTOLIC BLOOD PRESSURE: 71 MMHG | TEMPERATURE: 97.5 F | RESPIRATION RATE: 16 BRPM

## 2020-09-29 VITALS
OXYGEN SATURATION: 100 % | RESPIRATION RATE: 18 BRPM | SYSTOLIC BLOOD PRESSURE: 136 MMHG | DIASTOLIC BLOOD PRESSURE: 63 MMHG

## 2020-09-29 LAB
GLUCOSE BLD-MCNC: 292 MG/DL (ref 60–115)
PERFORMED ON: ABNORMAL

## 2020-09-29 PROCEDURE — 2580000003 HC RX 258: Performed by: SPECIALIST

## 2020-09-29 PROCEDURE — 45380 COLONOSCOPY AND BIOPSY: CPT | Performed by: SPECIALIST

## 2020-09-29 PROCEDURE — 3609027000 HC COLONOSCOPY: Performed by: SPECIALIST

## 2020-09-29 PROCEDURE — 2500000003 HC RX 250 WO HCPCS: Performed by: NURSE ANESTHETIST, CERTIFIED REGISTERED

## 2020-09-29 PROCEDURE — 2709999900 HC NON-CHARGEABLE SUPPLY: Performed by: SPECIALIST

## 2020-09-29 PROCEDURE — 88305 TISSUE EXAM BY PATHOLOGIST: CPT

## 2020-09-29 PROCEDURE — 6370000000 HC RX 637 (ALT 250 FOR IP): Performed by: SPECIALIST

## 2020-09-29 PROCEDURE — 7100000010 HC PHASE II RECOVERY - FIRST 15 MIN: Performed by: SPECIALIST

## 2020-09-29 PROCEDURE — 6360000002 HC RX W HCPCS: Performed by: NURSE ANESTHETIST, CERTIFIED REGISTERED

## 2020-09-29 PROCEDURE — 3700000000 HC ANESTHESIA ATTENDED CARE: Performed by: SPECIALIST

## 2020-09-29 PROCEDURE — 3700000001 HC ADD 15 MINUTES (ANESTHESIA): Performed by: SPECIALIST

## 2020-09-29 RX ORDER — 0.9 % SODIUM CHLORIDE 0.9 %
500 INTRAVENOUS SOLUTION INTRAVENOUS ONCE
Status: COMPLETED | OUTPATIENT
Start: 2020-09-29 | End: 2020-09-29

## 2020-09-29 RX ORDER — SODIUM CHLORIDE 9 MG/ML
INJECTION, SOLUTION INTRAVENOUS
Status: DISCONTINUED
Start: 2020-09-29 | End: 2020-09-29 | Stop reason: HOSPADM

## 2020-09-29 RX ORDER — PROPOFOL 10 MG/ML
INJECTION, EMULSION INTRAVENOUS PRN
Status: DISCONTINUED | OUTPATIENT
Start: 2020-09-29 | End: 2020-09-29 | Stop reason: SDUPTHER

## 2020-09-29 RX ORDER — SIMETHICONE 20 MG/.3ML
EMULSION ORAL PRN
Status: DISCONTINUED | OUTPATIENT
Start: 2020-09-29 | End: 2020-09-29 | Stop reason: ALTCHOICE

## 2020-09-29 RX ORDER — LIDOCAINE HYDROCHLORIDE 20 MG/ML
INJECTION, SOLUTION INFILTRATION; PERINEURAL PRN
Status: DISCONTINUED | OUTPATIENT
Start: 2020-09-29 | End: 2020-09-29 | Stop reason: SDUPTHER

## 2020-09-29 RX ORDER — MAGNESIUM HYDROXIDE 1200 MG/15ML
LIQUID ORAL PRN
Status: DISCONTINUED | OUTPATIENT
Start: 2020-09-29 | End: 2020-09-29 | Stop reason: ALTCHOICE

## 2020-09-29 RX ADMIN — LIDOCAINE HYDROCHLORIDE 60 MG: 20 INJECTION, SOLUTION INFILTRATION; PERINEURAL at 09:04

## 2020-09-29 RX ADMIN — PROPOFOL 300 MG: 10 INJECTION, EMULSION INTRAVENOUS at 09:04

## 2020-09-29 RX ADMIN — SODIUM CHLORIDE 500 ML: 9 INJECTION, SOLUTION INTRAVENOUS at 08:46

## 2020-09-29 ASSESSMENT — PAIN - FUNCTIONAL ASSESSMENT: PAIN_FUNCTIONAL_ASSESSMENT: 0-10

## 2020-09-29 NOTE — H&P
Patient Name: Jorge Morrell  : 1945  MRN: [id number]  DATE: 20      ENDOSCOPY  History and Physical    Procedure:    [x] Diagnostic Colonoscopy       [] Screening Colonoscopy  [] EGD      [] ERCP      [] EUS       [] Other    [x] Previous office notes/History and Physical reviewed from the patients chart. Please see EMR for further details of HPI. I have examined the patient's status immediately prior to the procedure and:      Indications/HPI:    []Abdominal Pain  []Cancer- GI/Lung  []Fhx of colon CA/polyps  []History of Polyps  []Valles   []Melena  []Abnormal Imaging  []Dysphagia    []Persistent Pneumonia  [x]Anemia  []Food Impaction  []History of Polyps  []GI Bleed  []Pulmonary nodule/Mass  []Change in bowel habits []Heartburn/Reflux  []Rectal Bleed (BRBPR)  []Chest Pain - Non Cardiac []Heme (+) Stoo  l[]Ulcers  []Constipation  []Hemoptysis   []Varices  []Diarrhea  []Hypoxemia  []Nausea/Vomiting  []Screening   []Crohns/Colitis  []Other:  Anesthesia:   [x] MAC [] Moderate Sedation   [] General   [] None     ROS: 12 pt Review of Symptoms was negative unless mentioned above    Medications:   Prior to Admission medications    Medication Sig Start Date End Date Taking?  Authorizing Provider   ACCU-CHEK PHYLLIS PLUS strip Test 3x daily Dx E11.65 9/15/20  Yes Ferdinand Carlson MD   insulin glargine (LANTUS) 100 UNIT/ML injection vial Inject 45 Units into the skin nightly 9/15/20  Yes Ferdinand Carlson MD   insulin lispro (HUMALOG) 100 UNIT/ML injection vial Inject 12 Units into the skin 3 times daily (with meals) 20  Yes Ferdinand Carlson MD   rosuvastatin (CRESTOR) 40 MG tablet Take 1 tablet by mouth every evening 20  Yes Leatha Islas MD   carvedilol (COREG) 25 MG tablet Take 2 tablets by mouth 2 times daily 7/3/20  Yes [facility] DO   verapamil (CALAN SR) 120 MG extended release tablet Take 1 tablet by mouth nightly 7/3/20  Yes Joe Ceron,    PARoxetine (PAXIL) 40 MG tablet TAKE 1 TABLET BY MOUTH ONCE DAILY IN THE MORNING  Patient taking differently: Take 40 mg by mouth every morning TAKE 1 TABLET BY MOUTH ONCE DAILY IN THE MORNING 5/11/20  Yes Michael Moore MD   digoxin (LANOXIN) 125 MCG tablet Take 1 tablet by mouth daily 5/11/20  Yes Jr Whelan MD   hydrALAZINE (APRESOLINE) 100 MG tablet Take 100 mg by mouth 2 times daily   Yes Heath Martines MD   furosemide (LASIX) 40 MG tablet Take 1 tablet by mouth daily 2/20/20  Yes Michael Moore MD   dicyclomine (BENTYL) 10 MG capsule Take 1 capsule by mouth 4 times daily as needed (abdominal pain) 12/9/19  Yes JOHNATHON Atkinson CNP   ondansetron (ZOFRAN) 4 MG tablet Take 1 tablet by mouth every 8 hours as needed for Nausea or Vomiting 12/9/19  Yes JOHNATHON Atkinson CNP   pantoprazole (PROTONIX) 40 MG tablet Take 40mg twice daily (1st dose 30min before breakfast) for 30 days. After 30 days, you make take 40mg once daily before breakfast. 2/26/20   Sofya Stratton,        Allergies:    Allergies   Allergen Reactions    Ibuprofen Nausea Only    Metformin And Related      Diarrhea      Darvon [Propoxyphene Hcl] Nausea And Vomiting        History of allergic reaction to anesthesia:  No    Past Medical History:  Past Medical History:   Diagnosis Date    Anxiety     Asthma     dx 2019 / has smoked since age 14    CHF (congestive heart failure) (HCC)     Chronic back pain     Bilateral L5 S1 Radic on emg--surprisingly worse on the left than the right--pt's symptoms and her MRI show worse on the right    Chronic obstructive pulmonary disease with acute exacerbation ([facility] [address] 10/12/2019    Depression     Fibromyalgia     Hyperlipidemia     meds > 8 yrs    Hypertension     meds > 45 yrs    On home O2     2l per n/c at bedtime mostly,     Osteoarthritis     Type II diabetes mellitus, uncontrolled (HCC)     hx > 8 yrs    Unspecified sleep apnea        Past Surgical History:  Past Surgical History:   Procedure Laterality Date    BACK SURGERY  2017    lumbar disc    CARDIAC CATHETERIZATION  11/3/14     DR. MIRELES / no stents    COLONOSCOPY  08/29/2016    w/polypectomy     DILATION AND CURETTAGE OF UTERUS N/A 10/7/2019    EUA HYSTEROSCOPY DILATATION AND CURETTAGE performed by Thedore Hamman, DO at [facility]. [address], COLON, DIAGNOSTIC      EYE SURGERY      [place] with IOL OU / [address] [place]  [address]    umbilical hernia repair    VT ESOPHAGOGASTRODUODENOSCOPY TRANSORAL DIAGNOSTIC N/A 3/24/2017    EGD ESOPHAGOGASTRODUODENOSCOPY performed by Mary Kate Robin MD at [facility] N/A 2/8/2018    negative findings    VT REVISE MEDIAN N/CARPAL TUNNEL SURG Left 6/5/2017    LEFT  CARPAL TUNNEL RELEASE [facility] N/A 2/8/2018    TONSILLECTOMY      as child    UPPER GASTROINTESTINAL ENDOSCOPY  08/26/2016    w/bx     UPPER GASTROINTESTINAL ENDOSCOPY N/A 2/25/2020    EGD possible biopsy performed by Abdi Wallace MD at [address] [place] [address] N/A 7/1/2020    EGD PUSH ENTEROSCOPY [facility] History:  Social History     Tobacco Use    Smoking status: Former Smoker     Packs/day: 1.00     Years: 59.00     Pack years: 59.00     Types: Cigarettes     Start date: 11/30/2017    Smokeless tobacco: Never Used    Tobacco comment: quit 2-3 weeks ago    Substance Use Topics    Alcohol use: Not Currently    Drug use: No       Vital Signs:   Vitals:    09/29/20 0832   BP: (!) 187/79   Pulse: 91   Resp: 16   Temp: 97.5 °F (36.4 °C)   SpO2: 99%        Physical Exam:  Cardiac:  [x]WNL  []Comments:  Pulmonary:  [x]WNL   []Comments:   Neuro/Mental Status:  [x]WNL  []Comments:  Abdominal:  [x]WNL    []Comments:  Other:   []WNL  []Comments:    Informed Consent:  The risks and benefits of the procedure have been discussed with either the patient or if they cannot consent, their representative. Assessment:  Patient examined and appropriate for planned sedation and procedure. Plan:  Proceed with planned sedation and procedure as above.     Leticia Danielson MD  9:05 AM

## 2020-09-29 NOTE — ANESTHESIA POSTPROCEDURE EVALUATION
Department of Anesthesiology  Postprocedure Note    Patient: Milli Merchant  MRN: [id number]  YOB: 1945  Date of evaluation: 9/29/2020  Time:  9:24 AM     Procedure Summary     Date:  09/29/20 Room / Location:  [address] [place] [address] Scooby Nguyen    Anesthesia Start:  4871 Anesthesia Stop:      Procedure:  COLONOSCOPY WITH POLYPECTOMY (N/A ) Diagnosis:  (38094 - Anemia)    Surgeon:  Mackenzie Meier MD Responsible Provider:  JOHNATHON Ibrahim CRNA    Anesthesia Type:  MAC ASA Status:  3          Anesthesia Type: MAC    Kristina Phase I:      Kristina Phase II:      Last vitals: Reviewed and per EMR flowsheets.        Anesthesia Post Evaluation    Patient location during evaluation: bedside  Patient participation: complete - patient participated  Level of consciousness: awake and awake and alert  Pain score: 0  Airway patency: patent  Nausea & Vomiting: no nausea and no vomiting  Complications: no  Cardiovascular status: blood pressure returned to baseline and hemodynamically stable  Respiratory status: acceptable and spontaneous ventilation  Hydration status: euvolemic

## 2020-10-01 ENCOUNTER — VIRTUAL VISIT (OUTPATIENT)
Dept: ENDOCRINOLOGY | Age: 75
End: 2020-10-01
Payer: MEDICARE

## 2020-10-01 PROCEDURE — 4040F PNEUMOC VAC/ADMIN/RCVD: CPT | Performed by: INTERNAL MEDICINE

## 2020-10-01 PROCEDURE — G8428 CUR MEDS NOT DOCUMENT: HCPCS | Performed by: INTERNAL MEDICINE

## 2020-10-01 PROCEDURE — 1123F ACP DISCUSS/DSCN MKR DOCD: CPT | Performed by: INTERNAL MEDICINE

## 2020-10-01 PROCEDURE — G8400 PT W/DXA NO RESULTS DOC: HCPCS | Performed by: INTERNAL MEDICINE

## 2020-10-01 PROCEDURE — 1090F PRES/ABSN URINE INCON ASSESS: CPT | Performed by: INTERNAL MEDICINE

## 2020-10-01 PROCEDURE — 99213 OFFICE O/P EST LOW 20 MIN: CPT | Performed by: INTERNAL MEDICINE

## 2020-10-01 RX ORDER — INSULIN GLARGINE 100 [IU]/ML
INJECTION, SOLUTION SUBCUTANEOUS
Qty: 3 VIAL | Refills: 3 | Status: ON HOLD | OUTPATIENT
Start: 2020-10-01 | End: 2020-10-20 | Stop reason: SDUPTHER

## 2020-10-01 NOTE — PROGRESS NOTES
10/1/2020    TELEHEALTH EVALUATION -- Audio/Visual (During RIQOT-34 public health emergency)    Due to COVID 19 outbreak, patient's office visit was converted to a virtual visit. Patient was contacted and agreed to proceed with a virtual visit via [email or web address]  The risks and benefits of converting to a virtual visit were discussed in light of the current infectious disease epidemic. Patient also understood that insurance coverage and co-pays are up to their individual insurance plans. HPI: Patient made aware video visit billable visit patient was at home I was at my office    [employer] (:  1945) has requested an audio/video evaluation for the following concern(s):    Type 2 diabetes on Lantus 45 units at bedtime plus Humalog 12 units with each meals blood sugars have been staying between 3-4 500 range patient has been sleeping most of the day not moving around last A1c was 9.6 end of August    Results for Kalpesh Saucedo (MRN [id number]) as of 10/1/2020 16:43   Ref.  Range 3/15/2019 11:08 10/8/2019 10:30 2020 05:24 2020 20:49   Hemoglobin A1C Latest Ref Range: 4.8 - 5.9 % 14 8.6 (H) 6.9 (H) 9.6 (H)     Patient Active Problem List   Diagnosis    Hypertension    Hyperlipidemia    Uncontrolled type 2 diabetes mellitus with complication, without long-term current use of insulin (HCC)    Fibromyalgia    Anxiety    Smoker    Insomnia    DJD (degenerative joint disease), lumbar    Lumbosacral radiculopathy at S1    DDD (degenerative disc disease), lumbar    Dysphonia    CTS (carpal tunnel syndrome)    High risk medication use-Norco - 17 OARRS PM&R, 18 OARRS PM&R, 18 OARRS PM&R, Urine Drug screen negative 17 PM&R--MED CONTRACT 17    SOB (shortness of breath) on exertion    Chest pain    Memory deficit    Artificial lens present    Presbyopia    Astigmatism, regular    Cataract, nuclear sclerotic senile    IDDM (insulin dependent diabetes mellitus)    Regular astigmatism    Nuclear senile cataract    Neck pain    Lumbosacral radiculopathy at L5    Spinal stenosis of lumbar region with neurogenic claudication    Impaired mobility and activities of daily living    Non-compliant patient NO showed FU 1/10/17 Dr Briseida Haynes Insomnia secondary to chronic pain    Reactive depression    Diabetic radiculopathy (HCC)    Cervical radicular pain    Diabetic asymmetric polyneuropathy (HCC)    Myalgia    Intercostal neuropathy    Osteoarthritis of spine with radiculopathy, lumbar region    Acute combined systolic and diastolic CHF, NYHA class 1 (HCC)    PMB (postmenopausal bleeding)    Acute on chronic diastolic heart failure (HCC)    CHF (congestive heart failure) (HCC)    Hypertensive urgency    Uncontrolled type 2 diabetes mellitus with hyperglycemia (HCC)    Chronic obstructive pulmonary disease with acute exacerbation (HCC)    Acute on chronic diastolic (congestive) heart failure ([address]    SAÚL (acute kidney injury) ([address]    Hypoglycemia    HOCM (hypertrophic obstructive cardiomyopathy) (HCC)    Gastrointestinal hemorrhage    COPD exacerbation (HCC)    Anemia    AVM (arteriovenous malformation)    Symptomatic anemia    Flash pulmonary edema (HCC)    Acute on chronic kidney failure (HCC)    Dysarthria    Chronic fatigue    Acute encephalopathy    Adenomatous polyp of sigmoid colon    Adenomatous polyp of transverse colon         Review of Systems    Prior to Visit Medications    Medication Sig Taking?  Authorizing Provider   ACCU-CHEK PHYLLIS PLUS strip Test 3x daily Dx E11.65  Shy Da Silva MD   insulin glargine (LANTUS) 100 UNIT/ML injection vial Inject 45 Units into the skin nightly  Shy Da Silva MD   insulin lispro (HUMALOG) 100 UNIT/ML injection vial Inject 12 Units into the skin 3 times daily (with meals)  Shy D aSilva MD   rosuvastatin (CRESTOR) 40 MG tablet Take 1 tablet by mouth every evening  Cristopher Daley MD   carvedilol (COREG) 25 MG tablet Take 2 tablets by mouth 2 times daily  Nathalie Daugherty, DO   verapamil (CALAN SR) 120 MG extended release tablet Take 1 tablet by mouth nightly  Joe Ceron DO   PARoxetine (PAXIL) 40 MG tablet TAKE 1 TABLET BY MOUTH ONCE DAILY IN THE MORNING  Patient taking differently: Take 40 mg by mouth every morning TAKE 1 TABLET BY MOUTH ONCE DAILY IN THE MORNING  Geneva Recio MD   digoxin (LANOXIN) 125 MCG tablet Take 1 tablet by mouth daily  Jose Roberto Guzman MD   hydrALAZINE (APRESOLINE) 100 MG tablet Take 100 mg by mouth 2 times daily  Historical Provider, MD   pantoprazole (PROTONIX) 40 MG tablet Take 40mg twice daily (1st dose 30min before breakfast) for 30 days. After 30 days, you make take 40mg once daily before breakfast.  Junie Drummond,    furosemide (LASIX) 40 MG tablet Take 1 tablet by mouth daily  Geneva Recio MD   dicyclomine (BENTYL) 10 MG capsule Take 1 capsule by mouth 4 times daily as needed (abdominal pain)  Teola Manual, APRN - CNP   ondansetron (ZOFRAN) 4 MG tablet Take 1 tablet by mouth every 8 hours as needed for Nausea or Vomiting  Teola Manual, APRN - CNP       Social History     Tobacco Use    Smoking status: Former Smoker     Packs/day: 1.00     Years: 59.00     Pack years: 59.00     Types: Cigarettes     Start date: 11/30/2017    Smokeless tobacco: Never Used    Tobacco comment: quit 2-3 weeks ago    Substance Use Topics    Alcohol use: Not Currently    Drug use:  No            PHYSICAL EXAMINATION:  [ INSTRUCTIONS:  \"[x]\" Indicates a positive item  \"[]\" Indicates a negative item  -- DELETE ALL ITEMS NOT EXAMINED]  [] Alert  [] Oriented to person/place/time    [] No apparent distress  [] Toxic appearing    [] Face flushed appearing [] Sclera clear  [] Lips are cyanotic      [] Breathing appears normal  [] Appears tachypneic      [] Rash on visible skin    [] Cranial Nerves II-XII grossly intact    [] Motor grossly intact in visible upper extremities [] Motor grossly intact in visible lower extremities    [] Normal Mood  [] Anxious appearing    [] Depressed appearing  [] Confused appearing      [] Poor short term memory  [] Poor long term memory    [] OTHER:      Due to this being a TeleHealth encounter, evaluation of the following organ systems is limited: Vitals/Constitutional/EENT/Resp/CV/GI//MS/Neuro/Skin/Heme-Lymph-Imm. ASSESSMENT/PLAN:     Diagnosis Orders   1. Type 2 diabetes mellitus with other specified complication, with long-term current use of insulin ([facility])       Orders Placed This Encounter   Medications    insulin glargine (LANTUS) 100 UNIT/ML injection vial     Si units at bedtime     Dispense:  3 vial     Refill:  3    insulin lispro (HUMALOG) 100 UNIT/ML injection vial     Si units at each mels     Dispense:  1 vial     Refill:  3       Increase insulin to Lantus 60 at night Humalog 20 with each meals patient to have labs done in the next 2 to 3 months time follow-up in 4 weeks to review her blood sugar control blood sugar goal 1 50-2 50 avoid hypoglycemia    Total time spent with patient was 15 minutes    An  electronic signature was used to authenticate this note. --Shashi Giron MD on 10/1/2020 at 4:41 PM        Pursuant to the emergency declaration under the [facility], [facility] waiver authority and the [employer] Act, this Virtual  Visit was conducted, with patient's consent, to reduce the patient's risk of exposure to COVID-19 and provide continuity of care for an established patient. Services were provided through a video synchronous discussion virtually to substitute for in-person clinic visit.

## 2020-10-05 ENCOUNTER — APPOINTMENT (OUTPATIENT)
Dept: CT IMAGING | Age: 75
DRG: 871 | End: 2020-10-05
Payer: MEDICARE

## 2020-10-05 ENCOUNTER — APPOINTMENT (OUTPATIENT)
Dept: GENERAL RADIOLOGY | Age: 75
DRG: 871 | End: 2020-10-05
Payer: MEDICARE

## 2020-10-05 ENCOUNTER — HOSPITAL ENCOUNTER (INPATIENT)
Age: 75
LOS: 14 days | Discharge: HOME HEALTH | DRG: 871 | End: 2020-10-20
Attending: INTERNAL MEDICINE | Admitting: INTERNAL MEDICINE
Payer: MEDICARE

## 2020-10-05 LAB
ALBUMIN SERPL-MCNC: 4 G/DL (ref 3.5–4.6)
ALP BLD-CCNC: 134 U/L (ref 40–130)
ALT SERPL-CCNC: 22 U/L (ref 0–33)
ANION GAP SERPL CALCULATED.3IONS-SCNC: 17 MEQ/L (ref 9–15)
ANISOCYTOSIS: ABNORMAL
APTT: 23.3 SEC (ref 24.4–36.8)
AST SERPL-CCNC: 37 U/L (ref 0–35)
BACTERIA: ABNORMAL /HPF
BASOPHILS ABSOLUTE: 0 K/UL (ref 0–0.2)
BASOPHILS RELATIVE PERCENT: 0.2 %
BILIRUB SERPL-MCNC: <0.2 MG/DL (ref 0.2–0.7)
BILIRUBIN URINE: NEGATIVE
BLOOD, URINE: ABNORMAL
BUN BLDV-MCNC: 26 MG/DL (ref 8–23)
CALCIUM SERPL-MCNC: 10.4 MG/DL (ref 8.5–9.9)
CHLORIDE BLD-SCNC: 93 MEQ/L (ref 95–107)
CK MB: 10.6 NG/ML (ref 0–3.8)
CLARITY: ABNORMAL
CO2: 24 MEQ/L (ref 20–31)
COLOR: YELLOW
CREAT SERPL-MCNC: 2.79 MG/DL (ref 0.5–0.9)
CREATINE KINASE-MB INDEX: 2.3 % (ref 0–3.5)
EKG ATRIAL RATE: 113 BPM
EKG P AXIS: 54 DEGREES
EKG P-R INTERVAL: 138 MS
EKG Q-T INTERVAL: 310 MS
EKG QRS DURATION: 78 MS
EKG QTC CALCULATION (BAZETT): 425 MS
EKG R AXIS: 41 DEGREES
EKG T AXIS: 195 DEGREES
EKG VENTRICULAR RATE: 113 BPM
EOSINOPHILS ABSOLUTE: 0.1 K/UL (ref 0–0.7)
EOSINOPHILS RELATIVE PERCENT: 1.9 %
EPITHELIAL CELLS, UA: ABNORMAL /HPF (ref 0–5)
GFR AFRICAN AMERICAN: 20
GFR NON-AFRICAN AMERICAN: 16.5
GLOBULIN: 3.5 G/DL (ref 2.3–3.5)
GLUCOSE BLD-MCNC: 157 MG/DL (ref 60–115)
GLUCOSE BLD-MCNC: 376 MG/DL (ref 70–99)
GLUCOSE BLD-MCNC: 420 MG/DL (ref 60–115)
GLUCOSE URINE: >=1000 MG/DL
HCT VFR BLD CALC: 35.8 % (ref 37–47)
HEMOGLOBIN: 11.1 G/DL (ref 12–16)
HYALINE CASTS: ABNORMAL /HPF (ref 0–5)
INR BLD: 1
KETONES, URINE: NEGATIVE MG/DL
LACTIC ACID, SEPSIS: 3.8 MMOL/L (ref 0.5–1.9)
LEUKOCYTE ESTERASE, URINE: ABNORMAL
LIPASE: 20 U/L (ref 12–95)
LYMPHOCYTES ABSOLUTE: 0.3 K/UL (ref 1–4.8)
LYMPHOCYTES RELATIVE PERCENT: 5.6 %
MAGNESIUM: 2.3 MG/DL (ref 1.7–2.4)
MCH RBC QN AUTO: 23.7 PG (ref 27–31.3)
MCHC RBC AUTO-ENTMCNC: 31.1 % (ref 33–37)
MCV RBC AUTO: 76.1 FL (ref 82–100)
MICROCYTES: ABNORMAL
MONOCYTES ABSOLUTE: 0 K/UL (ref 0.2–0.8)
MONOCYTES RELATIVE PERCENT: 0.6 %
NEUTROPHILS ABSOLUTE: 5 K/UL (ref 1.4–6.5)
NEUTROPHILS RELATIVE PERCENT: 91.7 %
NITRITE, URINE: POSITIVE
OVALOCYTES: ABNORMAL
PDW BLD-RTO: 24 % (ref 11.5–14.5)
PERFORMED ON: ABNORMAL
PERFORMED ON: ABNORMAL
PH UA: 6.5 (ref 5–9)
PLATELET # BLD: 249 K/UL (ref 130–400)
POC CREATININE WHOLE BLOOD: 3
POIKILOCYTES: ABNORMAL
POTASSIUM SERPL-SCNC: 4.8 MEQ/L (ref 3.4–4.9)
PRO-BNP: 2519 PG/ML
PROCALCITONIN: 0.04 NG/ML (ref 0–0.15)
PROTEIN UA: 100 MG/DL
PROTHROMBIN TIME: 13.1 SEC (ref 12.3–14.9)
RBC # BLD: 4.71 M/UL (ref 4.2–5.4)
RBC UA: ABNORMAL /HPF (ref 0–2)
SLIDE REVIEW: ABNORMAL
SODIUM BLD-SCNC: 134 MEQ/L (ref 135–144)
SPECIFIC GRAVITY UA: 1.01 (ref 1–1.03)
TEAR DROP CELLS: ABNORMAL
TOTAL CK: 466 U/L (ref 0–170)
TOTAL PROTEIN: 7.5 G/DL (ref 6.3–8)
TROPONIN: 0.04 NG/ML (ref 0–0.01)
TSH SERPL DL<=0.05 MIU/L-ACNC: 1.07 UIU/ML (ref 0.44–3.86)
URINE REFLEX TO CULTURE: YES
UROBILINOGEN, URINE: 0.2 E.U./DL
WBC # BLD: 5.5 K/UL (ref 4.8–10.8)
WBC UA: >100 /HPF (ref 0–5)

## 2020-10-05 PROCEDURE — 87186 SC STD MICRODIL/AGAR DIL: CPT

## 2020-10-05 PROCEDURE — 85610 PROTHROMBIN TIME: CPT

## 2020-10-05 PROCEDURE — 83880 ASSAY OF NATRIURETIC PEPTIDE: CPT

## 2020-10-05 PROCEDURE — 6370000000 HC RX 637 (ALT 250 FOR IP): Performed by: PERSONAL EMERGENCY RESPONSE ATTENDANT

## 2020-10-05 PROCEDURE — 96365 THER/PROPH/DIAG IV INF INIT: CPT

## 2020-10-05 PROCEDURE — 82550 ASSAY OF CK (CPK): CPT

## 2020-10-05 PROCEDURE — 93005 ELECTROCARDIOGRAM TRACING: CPT | Performed by: PERSONAL EMERGENCY RESPONSE ATTENDANT

## 2020-10-05 PROCEDURE — 84145 PROCALCITONIN (PCT): CPT

## 2020-10-05 PROCEDURE — 71045 X-RAY EXAM CHEST 1 VIEW: CPT

## 2020-10-05 PROCEDURE — 99285 EMERGENCY DEPT VISIT HI MDM: CPT

## 2020-10-05 PROCEDURE — 6360000002 HC RX W HCPCS: Performed by: PERSONAL EMERGENCY RESPONSE ATTENDANT

## 2020-10-05 PROCEDURE — 96375 TX/PRO/DX INJ NEW DRUG ADDON: CPT

## 2020-10-05 PROCEDURE — 82553 CREATINE MB FRACTION: CPT

## 2020-10-05 PROCEDURE — 83690 ASSAY OF LIPASE: CPT

## 2020-10-05 PROCEDURE — 2580000003 HC RX 258: Performed by: PERSONAL EMERGENCY RESPONSE ATTENDANT

## 2020-10-05 PROCEDURE — 84443 ASSAY THYROID STIM HORMONE: CPT

## 2020-10-05 PROCEDURE — 85025 COMPLETE CBC W/AUTO DIFF WBC: CPT

## 2020-10-05 PROCEDURE — 96361 HYDRATE IV INFUSION ADD-ON: CPT

## 2020-10-05 PROCEDURE — 74176 CT ABD & PELVIS W/O CONTRAST: CPT

## 2020-10-05 PROCEDURE — 81001 URINALYSIS AUTO W/SCOPE: CPT

## 2020-10-05 PROCEDURE — 85730 THROMBOPLASTIN TIME PARTIAL: CPT

## 2020-10-05 PROCEDURE — 83605 ASSAY OF LACTIC ACID: CPT

## 2020-10-05 PROCEDURE — 87077 CULTURE AEROBIC IDENTIFY: CPT

## 2020-10-05 PROCEDURE — 96376 TX/PRO/DX INJ SAME DRUG ADON: CPT

## 2020-10-05 PROCEDURE — 83735 ASSAY OF MAGNESIUM: CPT

## 2020-10-05 PROCEDURE — 87040 BLOOD CULTURE FOR BACTERIA: CPT

## 2020-10-05 PROCEDURE — 36415 COLL VENOUS BLD VENIPUNCTURE: CPT

## 2020-10-05 PROCEDURE — 87086 URINE CULTURE/COLONY COUNT: CPT

## 2020-10-05 PROCEDURE — 80053 COMPREHEN METABOLIC PANEL: CPT

## 2020-10-05 PROCEDURE — 84484 ASSAY OF TROPONIN QUANT: CPT

## 2020-10-05 RX ORDER — ONDANSETRON 2 MG/ML
4 INJECTION INTRAMUSCULAR; INTRAVENOUS ONCE
Status: COMPLETED | OUTPATIENT
Start: 2020-10-05 | End: 2020-10-05

## 2020-10-05 RX ORDER — 0.9 % SODIUM CHLORIDE 0.9 %
500 INTRAVENOUS SOLUTION INTRAVENOUS ONCE
Status: COMPLETED | OUTPATIENT
Start: 2020-10-05 | End: 2020-10-06

## 2020-10-05 RX ORDER — ACETAMINOPHEN 650 MG/1
650 SUPPOSITORY RECTAL ONCE
Status: COMPLETED | OUTPATIENT
Start: 2020-10-05 | End: 2020-10-05

## 2020-10-05 RX ORDER — MORPHINE SULFATE 2 MG/ML
4 INJECTION, SOLUTION INTRAMUSCULAR; INTRAVENOUS ONCE
Status: COMPLETED | OUTPATIENT
Start: 2020-10-05 | End: 2020-10-05

## 2020-10-05 RX ADMIN — MORPHINE SULFATE 4 MG: 2 INJECTION, SOLUTION INTRAMUSCULAR; INTRAVENOUS at 23:40

## 2020-10-05 RX ADMIN — ONDANSETRON 4 MG: 2 INJECTION INTRAMUSCULAR; INTRAVENOUS at 22:11

## 2020-10-05 RX ADMIN — ONDANSETRON 4 MG: 2 INJECTION INTRAMUSCULAR; INTRAVENOUS at 23:46

## 2020-10-05 RX ADMIN — CEFTRIAXONE SODIUM 1 G: 1 INJECTION, POWDER, FOR SOLUTION INTRAMUSCULAR; INTRAVENOUS at 23:25

## 2020-10-05 RX ADMIN — INSULIN HUMAN 10 UNITS: 100 INJECTION, SOLUTION PARENTERAL at 22:20

## 2020-10-05 RX ADMIN — SODIUM CHLORIDE 500 ML: 9 INJECTION, SOLUTION INTRAVENOUS at 23:25

## 2020-10-05 RX ADMIN — ACETAMINOPHEN 650 MG: 650 SUPPOSITORY RECTAL at 22:13

## 2020-10-05 ASSESSMENT — ENCOUNTER SYMPTOMS
VOMITING: 1
NAUSEA: 1
COUGH: 0
RHINORRHEA: 0
COLOR CHANGE: 0
ABDOMINAL PAIN: 1
BLOOD IN STOOL: 0
SORE THROAT: 0
SHORTNESS OF BREATH: 0
DIARRHEA: 0

## 2020-10-05 ASSESSMENT — PAIN DESCRIPTION - LOCATION
LOCATION: FLANK
LOCATION: ABDOMEN

## 2020-10-05 ASSESSMENT — PAIN DESCRIPTION - PAIN TYPE: TYPE: ACUTE PAIN

## 2020-10-05 ASSESSMENT — PAIN SCALES - GENERAL
PAINLEVEL_OUTOF10: 4
PAINLEVEL_OUTOF10: 5
PAINLEVEL_OUTOF10: 10

## 2020-10-05 ASSESSMENT — PAIN DESCRIPTION - ONSET: ONSET: PROGRESSIVE

## 2020-10-05 ASSESSMENT — PAIN DESCRIPTION - FREQUENCY: FREQUENCY: CONTINUOUS

## 2020-10-05 ASSESSMENT — PAIN DESCRIPTION - ORIENTATION: ORIENTATION: RIGHT

## 2020-10-06 PROBLEM — N30.00 ACUTE CYSTITIS WITHOUT HEMATURIA: Status: ACTIVE | Noted: 2020-10-06

## 2020-10-06 PROBLEM — F32.5 MAJOR DEPRESSIVE DISORDER IN REMISSION (HCC): Status: ACTIVE | Noted: 2020-10-06

## 2020-10-06 PROBLEM — A41.9 SEPSIS (HCC): Status: ACTIVE | Noted: 2020-10-06

## 2020-10-06 PROBLEM — N18.4 CKD (CHRONIC KIDNEY DISEASE) STAGE 4, GFR 15-29 ML/MIN (HCC): Status: ACTIVE | Noted: 2020-10-06

## 2020-10-06 PROBLEM — K21.9 GASTROESOPHAGEAL REFLUX DISEASE WITHOUT ESOPHAGITIS: Status: ACTIVE | Noted: 2020-10-06

## 2020-10-06 LAB
ANION GAP SERPL CALCULATED.3IONS-SCNC: 11 MEQ/L (ref 9–15)
BUN BLDV-MCNC: 30 MG/DL (ref 8–23)
CALCIUM SERPL-MCNC: 9.5 MG/DL (ref 8.5–9.9)
CHLORIDE BLD-SCNC: 105 MEQ/L (ref 95–107)
CO2: 28 MEQ/L (ref 20–31)
CREAT SERPL-MCNC: 3.19 MG/DL (ref 0.5–0.9)
GFR AFRICAN AMERICAN: 17.1
GFR AFRICAN AMERICAN: 18
GFR NON-AFRICAN AMERICAN: 14.1
GFR NON-AFRICAN AMERICAN: 15
GLUCOSE BLD-MCNC: 114 MG/DL (ref 60–115)
GLUCOSE BLD-MCNC: 130 MG/DL (ref 70–99)
GLUCOSE BLD-MCNC: 133 MG/DL (ref 60–115)
GLUCOSE BLD-MCNC: 137 MG/DL (ref 60–115)
GLUCOSE BLD-MCNC: 150 MG/DL (ref 60–115)
GLUCOSE BLD-MCNC: 216 MG/DL (ref 60–115)
GLUCOSE BLD-MCNC: 81 MG/DL (ref 60–115)
HCT VFR BLD CALC: 30.3 % (ref 37–47)
HEMOGLOBIN: 9.3 G/DL (ref 12–16)
LACTIC ACID, SEPSIS: 2 MMOL/L (ref 0.5–1.9)
LACTIC ACID: 2.3 MMOL/L (ref 0.5–2.2)
MCH RBC QN AUTO: 23.5 PG (ref 27–31.3)
MCHC RBC AUTO-ENTMCNC: 30.7 % (ref 33–37)
MCV RBC AUTO: 76.4 FL (ref 82–100)
PDW BLD-RTO: 23.9 % (ref 11.5–14.5)
PERFORMED ON: ABNORMAL
PERFORMED ON: NORMAL
PERFORMED ON: NORMAL
PLATELET # BLD: 244 K/UL (ref 130–400)
POC CREATININE: 3 MG/DL (ref 0.6–1.2)
POC SAMPLE TYPE: ABNORMAL
POTASSIUM REFLEX MAGNESIUM: 4.4 MEQ/L (ref 3.4–4.9)
RBC # BLD: 3.96 M/UL (ref 4.2–5.4)
SODIUM BLD-SCNC: 144 MEQ/L (ref 135–144)
WBC # BLD: 25.8 K/UL (ref 4.8–10.8)

## 2020-10-06 PROCEDURE — 6360000002 HC RX W HCPCS: Performed by: INTERNAL MEDICINE

## 2020-10-06 PROCEDURE — 6370000000 HC RX 637 (ALT 250 FOR IP): Performed by: INTERNAL MEDICINE

## 2020-10-06 PROCEDURE — 1210000000 HC MED SURG R&B

## 2020-10-06 PROCEDURE — 93010 ELECTROCARDIOGRAM REPORT: CPT | Performed by: INTERNAL MEDICINE

## 2020-10-06 PROCEDURE — 36415 COLL VENOUS BLD VENIPUNCTURE: CPT

## 2020-10-06 PROCEDURE — 80048 BASIC METABOLIC PNL TOTAL CA: CPT

## 2020-10-06 PROCEDURE — 2580000003 HC RX 258: Performed by: INTERNAL MEDICINE

## 2020-10-06 PROCEDURE — 85027 COMPLETE CBC AUTOMATED: CPT

## 2020-10-06 PROCEDURE — 83605 ASSAY OF LACTIC ACID: CPT

## 2020-10-06 RX ORDER — SODIUM CHLORIDE 0.9 % (FLUSH) 0.9 %
10 SYRINGE (ML) INJECTION PRN
Status: DISCONTINUED | OUTPATIENT
Start: 2020-10-06 | End: 2020-10-12

## 2020-10-06 RX ORDER — NICOTINE POLACRILEX 4 MG
15 LOZENGE BUCCAL PRN
Status: DISCONTINUED | OUTPATIENT
Start: 2020-10-06 | End: 2020-10-20 | Stop reason: HOSPADM

## 2020-10-06 RX ORDER — INSULIN GLARGINE 100 [IU]/ML
60 INJECTION, SOLUTION SUBCUTANEOUS NIGHTLY
Status: DISCONTINUED | OUTPATIENT
Start: 2020-10-06 | End: 2020-10-07

## 2020-10-06 RX ORDER — ONDANSETRON 2 MG/ML
4 INJECTION INTRAMUSCULAR; INTRAVENOUS EVERY 6 HOURS PRN
Status: DISCONTINUED | OUTPATIENT
Start: 2020-10-06 | End: 2020-10-20 | Stop reason: HOSPADM

## 2020-10-06 RX ORDER — SODIUM CHLORIDE 9 MG/ML
INJECTION, SOLUTION INTRAVENOUS CONTINUOUS
Status: DISCONTINUED | OUTPATIENT
Start: 2020-10-06 | End: 2020-10-07

## 2020-10-06 RX ORDER — CIPROFLOXACIN 2 MG/ML
400 INJECTION, SOLUTION INTRAVENOUS EVERY 12 HOURS
Status: DISCONTINUED | OUTPATIENT
Start: 2020-10-06 | End: 2020-10-06 | Stop reason: DRUGHIGH

## 2020-10-06 RX ORDER — DICYCLOMINE HYDROCHLORIDE 10 MG/1
10 CAPSULE ORAL 4 TIMES DAILY PRN
Status: DISCONTINUED | OUTPATIENT
Start: 2020-10-06 | End: 2020-10-13

## 2020-10-06 RX ORDER — SODIUM CHLORIDE 0.9 % (FLUSH) 0.9 %
10 SYRINGE (ML) INJECTION EVERY 12 HOURS SCHEDULED
Status: DISCONTINUED | OUTPATIENT
Start: 2020-10-06 | End: 2020-10-12

## 2020-10-06 RX ORDER — DEXTROSE MONOHYDRATE 50 MG/ML
100 INJECTION, SOLUTION INTRAVENOUS PRN
Status: DISCONTINUED | OUTPATIENT
Start: 2020-10-06 | End: 2020-10-20 | Stop reason: HOSPADM

## 2020-10-06 RX ORDER — HYDRALAZINE HYDROCHLORIDE 100 MG/1
100 TABLET, FILM COATED ORAL 2 TIMES DAILY
Status: DISCONTINUED | OUTPATIENT
Start: 2020-10-06 | End: 2020-10-07

## 2020-10-06 RX ORDER — DEXTROSE MONOHYDRATE 25 G/50ML
12.5 INJECTION, SOLUTION INTRAVENOUS PRN
Status: DISCONTINUED | OUTPATIENT
Start: 2020-10-06 | End: 2020-10-20 | Stop reason: HOSPADM

## 2020-10-06 RX ORDER — ROSUVASTATIN CALCIUM 40 MG/1
40 TABLET, COATED ORAL EVERY EVENING
Status: DISCONTINUED | OUTPATIENT
Start: 2020-10-06 | End: 2020-10-20 | Stop reason: HOSPADM

## 2020-10-06 RX ORDER — CARVEDILOL 25 MG/1
50 TABLET ORAL 2 TIMES DAILY
Status: DISCONTINUED | OUTPATIENT
Start: 2020-10-06 | End: 2020-10-07

## 2020-10-06 RX ORDER — PAROXETINE HYDROCHLORIDE 20 MG/1
40 TABLET, FILM COATED ORAL EVERY MORNING
Status: DISCONTINUED | OUTPATIENT
Start: 2020-10-06 | End: 2020-10-20 | Stop reason: HOSPADM

## 2020-10-06 RX ORDER — DIGOXIN 125 MCG
125 TABLET ORAL DAILY
Status: DISCONTINUED | OUTPATIENT
Start: 2020-10-06 | End: 2020-10-12

## 2020-10-06 RX ORDER — ACETAMINOPHEN 325 MG/1
650 TABLET ORAL EVERY 6 HOURS PRN
Status: DISCONTINUED | OUTPATIENT
Start: 2020-10-06 | End: 2020-10-20 | Stop reason: HOSPADM

## 2020-10-06 RX ORDER — PANTOPRAZOLE SODIUM 40 MG/1
40 TABLET, DELAYED RELEASE ORAL
Status: DISCONTINUED | OUTPATIENT
Start: 2020-10-06 | End: 2020-10-07

## 2020-10-06 RX ORDER — ACETAMINOPHEN 650 MG/1
650 SUPPOSITORY RECTAL EVERY 6 HOURS PRN
Status: DISCONTINUED | OUTPATIENT
Start: 2020-10-06 | End: 2020-10-20 | Stop reason: HOSPADM

## 2020-10-06 RX ORDER — INSULIN GLARGINE 100 [IU]/ML
45 INJECTION, SOLUTION SUBCUTANEOUS ONCE
Status: COMPLETED | OUTPATIENT
Start: 2020-10-06 | End: 2020-10-06

## 2020-10-06 RX ORDER — CIPROFLOXACIN 2 MG/ML
400 INJECTION, SOLUTION INTRAVENOUS EVERY 24 HOURS
Status: DISCONTINUED | OUTPATIENT
Start: 2020-10-06 | End: 2020-10-07

## 2020-10-06 RX ADMIN — PAROXETINE HYDROCHLORIDE 40 MG: 20 TABLET, FILM COATED ORAL at 08:28

## 2020-10-06 RX ADMIN — VERAPAMIL HYDROCHLORIDE 120 MG: 120 TABLET, FILM COATED, EXTENDED RELEASE ORAL at 21:40

## 2020-10-06 RX ADMIN — INSULIN LISPRO 20 UNITS: 100 INJECTION, SOLUTION INTRAVENOUS; SUBCUTANEOUS at 16:35

## 2020-10-06 RX ADMIN — PANTOPRAZOLE SODIUM 40 MG: 40 TABLET, DELAYED RELEASE ORAL at 06:24

## 2020-10-06 RX ADMIN — DIGOXIN 125 MCG: 125 TABLET ORAL at 08:29

## 2020-10-06 RX ADMIN — INSULIN GLARGINE 45 UNITS: 100 INJECTION, SOLUTION SUBCUTANEOUS at 21:42

## 2020-10-06 RX ADMIN — ONDANSETRON 4 MG: 2 INJECTION INTRAMUSCULAR; INTRAVENOUS at 14:37

## 2020-10-06 RX ADMIN — SODIUM CHLORIDE: 9 INJECTION, SOLUTION INTRAVENOUS at 06:24

## 2020-10-06 RX ADMIN — ENOXAPARIN SODIUM 30 MG: 30 INJECTION SUBCUTANEOUS at 08:29

## 2020-10-06 RX ADMIN — ONDANSETRON 4 MG: 2 INJECTION INTRAMUSCULAR; INTRAVENOUS at 06:24

## 2020-10-06 RX ADMIN — CIPROFLOXACIN 400 MG: 2 INJECTION, SOLUTION INTRAVENOUS at 10:48

## 2020-10-06 RX ADMIN — HYDRALAZINE HYDROCHLORIDE 100 MG: 100 TABLET, FILM COATED ORAL at 08:28

## 2020-10-06 RX ADMIN — HYDRALAZINE HYDROCHLORIDE 100 MG: 100 TABLET, FILM COATED ORAL at 21:41

## 2020-10-06 RX ADMIN — INSULIN LISPRO 20 UNITS: 100 INJECTION, SOLUTION INTRAVENOUS; SUBCUTANEOUS at 12:14

## 2020-10-06 RX ADMIN — ROSUVASTATIN CALCIUM 40 MG: 40 TABLET, FILM COATED ORAL at 16:35

## 2020-10-06 RX ADMIN — CARVEDILOL 50 MG: 25 TABLET, FILM COATED ORAL at 08:29

## 2020-10-06 RX ADMIN — CARVEDILOL 50 MG: 25 TABLET, FILM COATED ORAL at 21:41

## 2020-10-06 RX ADMIN — SODIUM CHLORIDE: 9 INJECTION, SOLUTION INTRAVENOUS at 19:51

## 2020-10-06 ASSESSMENT — PAIN SCALES - GENERAL
PAINLEVEL_OUTOF10: 0
PAINLEVEL_OUTOF10: 0
PAINLEVEL_OUTOF10: 4

## 2020-10-06 ASSESSMENT — PAIN DESCRIPTION - ORIENTATION: ORIENTATION: RIGHT

## 2020-10-06 ASSESSMENT — PAIN DESCRIPTION - LOCATION: LOCATION: FLANK

## 2020-10-06 NOTE — ED PROVIDER NOTES
meds > 8 yrs    Hypertension     meds > 45 yrs    On home O2     2l per n/c at bedtime mostly,     Osteoarthritis     Type II diabetes mellitus, uncontrolled ([facility]     hx > 8 yrs    Unspecified sleep apnea          SURGICAL HISTORY       Past Surgical History:   Procedure Laterality Date    BACK SURGERY  2017    lumbar disc    CARDIAC CATHETERIZATION  11/3/14     DR. MIRELES / no stents    COLONOSCOPY  08/29/2016    w/polypectomy     COLONOSCOPY N/A 9/29/2020    COLONOSCOPY WITH POLYPECTOMY [facility] N/A 10/7/2019    EUA HYSTEROSCOPY DILATATION AND CURETTAGE performed by Dariel Valenzuela DO at [facility]. [address], COLON, DIAGNOSTIC      EYE SURGERY      [place] with IOL OU / [address]    umbilical hernia repair    TX ESOPHAGOGASTRODUODENOSCOPY TRANSORAL DIAGNOSTIC N/A 3/24/2017    EGD ESOPHAGOGASTRODUODENOSCOPY performed by Nacho Dickinson MD at [place] [address] 2/8/2018    negative findings    TX REVISE MEDIAN N/CARPAL TUNNEL SURG Left 6/5/2017    LEFT  CARPAL TUNNEL RELEASE performed by Alonso Kemp MD at [facility],7+O/G,QYCPS N/A 2/8/2018    TONSILLECTOMY      as child    UPPER GASTROINTESTINAL ENDOSCOPY  08/26/2016    w/bx     UPPER GASTROINTESTINAL ENDOSCOPY N/A 2/25/2020    EGD possible biopsy performed by Kellen Alvarez MD at [facility] 7/1/2020    EGD PUSH ENTEROSCOPY performed by Joseph Martinez MD at [address] [place] [address]       Previous Medications    ACCU-CHEK PHYLLIS PLUS STRIP    Test 3x daily Dx E11.65    CARVEDILOL (COREG) 25 MG TABLET    Take 2 tablets by mouth 2 times daily    DICYCLOMINE (BENTYL) 10 MG CAPSULE    Take 1 capsule by mouth 4 times daily as needed (abdominal pain)    DIGOXIN (LANOXIN) 125 MCG TABLET    Take 1 tablet by mouth daily    FUROSEMIDE (LASIX) 40 MG TABLET    Take 1 tablet by mouth daily    HYDRALAZINE (APRESOLINE) 100 MG TABLET    Take 100 mg by mouth 2 times daily    INSULIN GLARGINE (LANTUS) 100 UNIT/ML INJECTION VIAL    60 units at bedtime    INSULIN LISPRO (HUMALOG) 100 UNIT/ML INJECTION VIAL    20 units at each mels    ONDANSETRON (ZOFRAN) 4 MG TABLET    Take 1 tablet by mouth every 8 hours as needed for Nausea or Vomiting    PANTOPRAZOLE (PROTONIX) 40 MG TABLET    Take 40mg twice daily (1st dose 30min before breakfast) for 30 days.  After 30 days, you make take 40mg once daily before breakfast.    PAROXETINE (PAXIL) 40 MG TABLET    TAKE 1 TABLET BY MOUTH ONCE DAILY IN THE MORNING    ROSUVASTATIN (CRESTOR) 40 MG TABLET    Take 1 tablet by mouth every evening    VERAPAMIL (CALAN SR) 120 MG EXTENDED RELEASE TABLET    Take 1 tablet by mouth nightly       ALLERGIES     Ibuprofen; Metformin and related; and Darvon [propoxyphene hcl]    FAMILY HISTORY       Family History   Problem Relation Age of Onset    Heart Disease Father         cardiac bypass    Arthritis Father     Arthritis Mother     Other Mother          at age 80    Other Sister         [facility]    No Known Problems Daughter     Stroke Son           SOCIAL HISTORY       Social History     Socioeconomic History    Marital status:      Spouse name: None    Number of children: None    Years of education: None    Highest education level: None   Occupational History    Occupation: Retired-   Social Needs    Financial resource strain: None    Food insecurity     Worry: None     Inability: None    Transportation needs     Medical: None     Non-medical: None   Tobacco Use    Smoking status: Former Smoker     Packs/day: 1.00     Years: 59.00     Pack years: 59.00     Types: Cigarettes     Start date: 2017    Smokeless tobacco: Never Used    Tobacco comment: quit 2-3 weeks ago    Substance and Sexual Activity    Alcohol use: Not Currently    Drug use: No    Sexual activity: Yes   Lifestyle    Physical activity     Days per week: None     Minutes per session: None    Stress: None   Relationships    Social connections     Talks on phone: None     Gets together: None     Attends [employer] service: None     Active member of club or organization: None     Attends meetings of clubs or organizations: None     Relationship status: None    Intimate partner violence     Fear of current or ex partner: None     Emotionally abused: None     Physically abused: None     Forced sexual activity: None   Other Topics Concern    None   Social History Narrative    None         PHYSICAL EXAM         ED Triage Vitals [10/05/20 2155]   BP Temp Temp Source Pulse Resp SpO2 Height Weight   (!) 158/64 101.7 °F (38.7 °C) Oral 106 18 98 % 4' 11\" (1.499 m) 134 lb (60.8 kg)       Physical Exam  Constitutional:       Appearance: She is well-developed. She is diaphoretic. Comments: Does appear weak in the room, emesis on clothes   HENT:      Head: Normocephalic and atraumatic. Eyes:      Conjunctiva/sclera: Conjunctivae normal.      Pupils: Pupils are equal, round, and reactive to light. Neck:      Musculoskeletal: Normal range of motion and neck supple. Trachea: No tracheal deviation. Cardiovascular:      Heart sounds: Normal heart sounds. Pulmonary:      Effort: Pulmonary effort is normal. No respiratory distress. Breath sounds: Normal breath sounds. No stridor. Abdominal:      General: Bowel sounds are normal. There is no distension. Palpations: Abdomen is soft. There is no mass. Tenderness: There is no abdominal tenderness. There is no guarding or rebound. Musculoskeletal: Normal range of motion. Skin:     General: Skin is warm. Capillary Refill: Capillary refill takes less than 2 seconds. Findings: No rash.    Neurological:      Mental Status: She is alert and oriented to person, (*)     AST 37 (*)     All other components within normal limits    Narrative:     Manolo Hunter tel. [phone],  TROP results [facility] and read back [facility], 10/05/2020 23:12, by  [facility]   LACTATE, SEPSIS - Abnormal; Notable for the following components:    Lactic Acid, Sepsis 3.8 (*)     All other components within normal limits    Narrative:     Manolo Hunter tel. [phone],  LACID results called to and read back by [facility], 10/05/2020 22:58, by  Bijan Neither results called to and read back by [facility], 10/05/2020 22:57, by  [facility]   TROPONIN - Abnormal; Notable for the following components:    Troponin 0.041 (*)     All other components within normal limits    Narrative:     Manolo Hunter tel. [phone],  TROP results called to and read back by [facility], 10/05/2020 23:12, by  [facility]   URINE RT REFLEX TO CULTURE - Abnormal; Notable for the following components:    Clarity, UA CLOUDY (*)     Glucose, Ur >=1000 (*)     Blood, Urine LARGE (*)     Protein,  (*)     Nitrite, Urine POSITIVE (*)     Leukocyte Esterase, Urine LARGE (*)     All other components within normal limits   MICROSCOPIC URINALYSIS - Abnormal; Notable for the following components:    RBC, UA 20-50 (*)     Bacteria, UA MANY (*)     WBC, UA >100 (*)     All other components within normal limits   CKMB & RELATIVE PERCENT - Abnormal; Notable for the following components:    CK-MB 10.6 (*)     All other components within normal limits    Narrative:     CALL  [facility] tel. [phone],  TROP results called to and read back by [facility], 10/05/2020 23:12, by  [facility]   POCT GLUCOSE - Abnormal; Notable for the following components:    POC Glucose 420 (*)     All other components within normal limits   POCT GLUCOSE - Abnormal; Notable for the following components:    POC Glucose 157 (*)     All other components within normal limits   POCT CREATININE - URINE - Normal   CULTURE, BLOOD 2   CULTURE, BLOOD 1   CULTURE, probability of clinically significant/life threatening deterioration in the patient's condition which required my urgent intervention. Procedures    FINAL IMPRESSION      1. Septicemia ([address]    2. Acute cystitis without hematuria    3. Elevated troponin    4. Constipation, unspecified constipation type    5. Elevated lactic acid level    6. Hyperglycemia          DISPOSITION/PLAN   DISPOSITION Decision To Admit 10/05/2020 10:59:02 PM      PATIENT REFERRED TO:  No follow-up provider specified. DISCHARGE MEDICATIONS:  New Prescriptions    No medications on file          (Please notethat portions of this note were completed with a voice recognition program.  Efforts were made to edit the dictations but occasionally words are mis-transcribed. )    DIANE Vears (electronically signed)  Emergency Physician Assistant         Vera Lovingma  10/05/20 9057

## 2020-10-06 NOTE — PROGRESS NOTES
Physician Progress Note      Lam Ledezma  CSN #:                  [id number]  :                       1945  ADMIT DATE:       10/5/2020 9:54 PM  [address] [place] DATE:  RESPONDING  PROVIDER #:        Janette Mcguire DO          QUERY TEXT:    Dear Attending:    Patient  admitted with sepsis, acute cystitis and has CHF documented. If   possible, please document in progress notes and discharge summary further   specificity regarding the type and acuity of CHF:    The medical record reflects the following:  Risk Factors: CAD CHF HTN DMII  Clinical Indicators: echo 20 LVEF 80%  per H/P: hx of CHF hx of diastolic   dysfunction. No evidence of acute decomp. Will hold lasix until pt   euvolemic. Treatment: coreg lanoxin  apresoline   calan    Thank you,  Dulce ORDONEZN RN CDS  contact [facility] Sapna Bradford  or  [email or web address]. Ium w/ any questions  Options provided:  -- Chronic Systolic CHF/HFrEF  -- Chronic Diastolic CHF/HFpEF  -- Chronic Systolic and Diastolic CHF  -- Other - I will add my own diagnosis  -- Disagree - Not applicable / Not valid  -- Disagree - Clinically unable to determine / Unknown  -- Refer to Clinical Documentation Reviewer    PROVIDER RESPONSE TEXT:    This patient has chronic diastolic CHF/HFpEF.     Query created by: Ibrahima Villavicencio on 10/6/2020 7:14 AM      Electronically signed by:  Janette Mcguire DO 10/6/2020 11:11 AM

## 2020-10-06 NOTE — ED NOTES
Jackelyn CONTRERAS notified that POC creatinine 3.0. relates CT will be changed to without contrast.     Birgit Harvey RN  10/05/20 [id number]

## 2020-10-06 NOTE — PROGRESS NOTES
Renal Adjustment Per Protocol: Ciprofloxacin 400 mg IV q12h changed to q24h based on    Recent Labs     10/06/20  0625   CREATININE 3.19*    CrCl cannot be calculated (Unknown ideal weight.).   CrCl mannually calculated  Age 67 Female  Ht 149.9cm  Wt 59.5 kg  Serum Cr 3.19 mg/dL  IBW 44 kg  CrCl 10.4 ml/min

## 2020-10-06 NOTE — ED TRIAGE NOTES
Patient to ED with c/o emesis, chills and sudden abdominal pain  Patient rates pain 4/10  Skin warm and dry  Patient febrile 101.7  accucheck 420  resp equal and unlabored  Family at bedside  ekg ordered and completed   Patient placed on monitor

## 2020-10-06 NOTE — H&P
Department of Internal Medicine  General Internal Medicine  Attending History and Physical      CHIEF COMPLAINT:  Fevers/chills x 12h    Reason for Admission:  Sepsis/uti    History Obtained From:  patient    HISTORY OF PRESENT ILLNESS:      The patient is a 67 y.o. female with significant past medical history as noted below who presents with above cc. Pt states that over past week she has been having dysuria. Over past 24 hrs or so pt began to have fevers and chills so she came to ED for eval.  Pt found to have UTI, elev BS, and lactic acidosis. She does have a hx of elev trop. Pt denies cp, sob, ha, n/v, d/c, abd pain, vision changes. Past Medical History:        Diagnosis Date    Anxiety     Asthma     dx 2019 / has smoked since age 14    CHF (congestive heart failure) ([facility])     Chronic back pain     Bilateral L5 S1 Radic on emg--surprisingly worse on the left than the right--pt's symptoms and her MRI show worse on the right    Chronic obstructive pulmonary disease with acute exacerbation ([address] 10/12/2019    Depression     Fibromyalgia     Hyperlipidemia     meds > 8 yrs    Hypertension     meds > 45 yrs    On home O2     2l per n/c at bedtime mostly,     Osteoarthritis     Type II diabetes mellitus, uncontrolled ([address]     hx > 8 yrs    Unspecified sleep apnea      Past Surgical History:        Procedure Laterality Date    BACK SURGERY  2017    lumbar disc    CARDIAC CATHETERIZATION  11/3/14     DR. MIRELES / karuna ayala    COLONOSCOPY  08/29/2016    w/polypectomy     COLONOSCOPY N/A 9/29/2020    COLONOSCOPY WITH POLYPECTOMY performed by Charla Nieto MD at [facility] N/A 10/7/2019    EUA HYSTEROSCOPY DILATATION AND CURETTAGE performed by Sarahi Delgado DO at [place] [id number], COLON, DIAGNOSTIC      EYE SURGERY      [place] with IOL OU / [id number]    umbilical hernia repair    SD ESOPHAGOGASTRODUODENOSCOPY TRANSORAL DIAGNOSTIC N/A 3/24/2017    EGD ESOPHAGOGASTRODUODENOSCOPY performed by Carlitos Araiza MD at [facility] N/A 2/8/2018    negative findings    LA REVISE MEDIAN N/CARPAL TUNNEL SURG Left 6/5/2017    LEFT  CARPAL TUNNEL RELEASE performed by Karlie Batista MD at [facility] IQQN,[id number] N/A 2/8/2018    TONSILLECTOMY      as child    UPPER GASTROINTESTINAL ENDOSCOPY  08/26/2016    w/bx     UPPER GASTROINTESTINAL ENDOSCOPY N/A 2/25/2020    EGD possible biopsy performed by Kacey Benites MD at [address] [place] [address] 7/1/2020    EGD PUSH ENTEROSCOPY performed by Nate Le MD at [facility]     Medications Prior to Admission:    Medications Prior to Admission: insulin glargine (LANTUS) 100 UNIT/ML injection vial, 60 units at bedtime  insulin lispro (HUMALOG) 100 UNIT/ML injection vial, 20 units at each mels  ACCU-CHEK PHYLLIS PLUS strip, Test 3x daily Dx E11.65  rosuvastatin (CRESTOR) 40 MG tablet, Take 1 tablet by mouth every evening  carvedilol (COREG) 25 MG tablet, Take 2 tablets by mouth 2 times daily  verapamil (CALAN SR) 120 MG extended release tablet, Take 1 tablet by mouth nightly  PARoxetine (PAXIL) 40 MG tablet, TAKE 1 TABLET BY MOUTH ONCE DAILY IN THE MORNING (Patient taking differently: Take 40 mg by mouth every morning TAKE 1 TABLET BY MOUTH ONCE DAILY IN THE MORNING)  digoxin (LANOXIN) 125 MCG tablet, Take 1 tablet by mouth daily  hydrALAZINE (APRESOLINE) 100 MG tablet, Take 100 mg by mouth 2 times daily  pantoprazole (PROTONIX) 40 MG tablet, Take 40mg twice daily (1st dose 30min before breakfast) for 30 days.  After 30 days, you make take 40mg once daily before breakfast.  furosemide (LASIX) 40 MG tablet, Take 1 tablet by mouth daily  dicyclomine (BENTYL) 10 MG capsule, Take 1 capsule by mouth 4 times daily as needed (abdominal pain)  ondansetron (ZOFRAN) 4 MG tablet, Take 1 tablet by mouth every 8 hours as needed for Nausea or Vomiting    Allergies:  Ibuprofen; Metformin and related; and Darvon [propoxyphene hcl]    Social History:   TOBACCO:   reports that she has quit smoking. Her smoking use included cigarettes. She started smoking about 2 years ago. She has a 59.00 pack-year smoking history. She has never used smokeless tobacco.  ETOH:   reports previous alcohol use. DRUGS:   reports no history of drug use. Family History:       Problem Relation Age of Onset    Heart Disease Father         cardiac bypass    Arthritis Father     Arthritis Mother     Other Mother          at age 80    Other Sister         nuknown health hx    No Known Problems Daughter     Stroke Son      REVIEW OF SYSTEMS:  14 systems reviewed and negative except as noted above  PHYSICAL EXAM:    Vitals:  /80   Pulse 95   Temp 98.3 °F (36.8 °C) (Oral)   Resp 16   Ht 4' 11\" (1.499 m)   Wt 131 lb 2.8 oz (59.5 kg)   LMP  (LMP Unknown)   SpO2 100%   BMI 26.49 kg/m²     CONSTITUTIONAL:  awake, alert, cooperative, no apparent distress, and appears stated age  EYES:  Lids and lashes normal, pupils equal, round and reactive to light, extra ocular muscles intact, sclera clear, conjunctiva normal  ENT:  Normocephalic, without obvious abnormality, atraumatic, sinuses nontender on palpation, external ears without lesions, oral pharynx with moist mucus membranes, tonsils without erythema or exudates, gums normal and good dentition. LUNGS:  No increased work of breathing, good air exchange, clear to auscultation bilaterally, no crackles or wheezing  CARDIOVASCULAR:  murmurs include systolic murmur III/VI located at left upper sternal border without radiation  ABDOMEN:  No scars, normal bowel sounds, soft, non-distended, non-tender, no masses palpated, no hepatosplenomegally  MUSCULOSKELETAL:  There is no redness, warmth, or swelling of the joints. Full range of motion noted.   Motor strength is 5 out of 5 all extremities bilaterally. Tone is normal.  NEUROLOGIC:  Awake, alert, oriented to name, place and time. Cranial nerves II-XII are grossly intact. Motor is 5 out of 5 bilaterally. Cerebellar finger to nose, heel to shin intact. Sensory is intact. Babinski down going, Romberg negative, and gait is normal.  SKIN:  normal skin color, texture, turgor    DATA:  CBC:   Lab Results   Component Value Date    WBC 5.5 10/05/2020    RBC 4.71 10/05/2020    HGB 11.1 10/05/2020    HCT 35.8 10/05/2020    MCV 76.1 10/05/2020    MCH 23.7 10/05/2020    MCHC 31.1 10/05/2020    RDW 24.0 10/05/2020     10/05/2020    MPV 8.8 08/01/2015     CMP:    Lab Results   Component Value Date     10/05/2020    K 4.8 10/05/2020    K 4.6 07/03/2020    CL 93 10/05/2020    CO2 24 10/05/2020    BUN 26 10/05/2020    CREATININE 3.0 10/05/2020    CREATININE 2.79 10/05/2020    GFRAA 18 10/05/2020    LABGLOM 15 10/05/2020    GLUCOSE 376 10/05/2020    PROT 7.5 10/05/2020    LABALBU 4.0 10/05/2020    CALCIUM 10.4 10/05/2020    BILITOT <0.2 10/05/2020    ALKPHOS 134 10/05/2020    AST 37 10/05/2020    ALT 22 10/05/2020     U/A:    Lab Results   Component Value Date    NITRITE pos 09/27/2019    COLORU Yellow 10/05/2020    PROTEINU 100 10/05/2020    PHUR 6.5 10/05/2020    WBCUA >100 10/05/2020    RBCUA 20-50 10/05/2020    MUCUS Present 04/17/2015    YEAST Present 04/17/2015    BACTERIA MANY 10/05/2020    CLARITYU CLOUDY 10/05/2020    SPECGRAV 1.015 10/05/2020    LEUKOCYTESUR LARGE 10/05/2020    UROBILINOGEN 0.2 10/05/2020    BILIRUBINUR Negative 10/05/2020    BILIRUBINUR neg 09/27/2019    BLOODU LARGE 10/05/2020    GLUCOSEU >=1000 10/05/2020     ASSESSMENT AND PLAN:      Principal Problem:    Sepsis ([address]  Plan: gentle IVF. Do not want to be too aggressive with IVF given hx of chf.  Lactic acidosis is improving with ivf. Will recheck. Vital signs currently stable.   Active Problems:    Acute cystitis without hematuria  Plan: IV roephin    Hypertension  Plan: resume oral anti htn meds    Hyperlipidemia  Plan: cont statin    CHF (congestive heart failure) ([facility])  Plan: hx of diastolic dysfunction. No evidence of acute decomp. Will hold lasix until pt euvolemic. Uncontrolled type 2 diabetes mellitus with hyperglycemia ([facility]  Plan: resume pt home insulin order. Will add on low dose SSI. Pt received IV insulin in ED and BS has normalized. Major depressive disorder in remission ([facility]  Plan: cont paxil    Gastroesophageal reflux disease without esophagitis  Plan: cont ppi    SAÚL/CKD (chronic kidney disease) stage 4, GFR 15-29 ml/min ([facility])  Plan: pt at baseline GFR approx 25-30. Will hold lasix and recheck in AM.    dvt proph - hep/scds    Inpatient status. Anticipate greater than 2 day stay due to above.

## 2020-10-06 NOTE — PROGRESS NOTES
Department of Internal Medicine  General Internal Medicine  Attending Progress Note      SUBJECTIVE:  Pt seen and examined. Symptoms of nausea, vomiting improved today, but patient states she is still fatigued and tired.     OBJECTIVE      Medications    Current Facility-Administered Medications: ondansetron (ZOFRAN) injection 4 mg, 4 mg, Intravenous, Q6H PRN  carvedilol (COREG) tablet 50 mg, 50 mg, Oral, BID  dicyclomine (BENTYL) capsule 10 mg, 10 mg, Oral, 4x Daily PRN  digoxin (LANOXIN) tablet 125 mcg, 125 mcg, Oral, Daily  hydrALAZINE (APRESOLINE) tablet 100 mg, 100 mg, Oral, BID  insulin glargine (LANTUS) injection vial 60 Units, 60 Units, Subcutaneous, Nightly  insulin lispro (HUMALOG) injection vial 20 Units, 20 Units, Subcutaneous, TID WC  pantoprazole (PROTONIX) tablet 40 mg, 40 mg, Oral, QAM AC  PARoxetine (PAXIL) tablet 40 mg, 40 mg, Oral, QAM  rosuvastatin (CRESTOR) tablet 40 mg, 40 mg, Oral, QPM  verapamil (CALAN SR) extended release tablet 120 mg, 120 mg, Oral, Nightly  sodium chloride flush 0.9 % injection 10 mL, 10 mL, Intravenous, 2 times per day  sodium chloride flush 0.9 % injection 10 mL, 10 mL, Intravenous, PRN  acetaminophen (TYLENOL) tablet 650 mg, 650 mg, Oral, Q6H PRN **OR** acetaminophen (TYLENOL) suppository 650 mg, 650 mg, Rectal, Q6H PRN  enoxaparin (LOVENOX) injection 30 mg, 30 mg, Subcutaneous, Daily  insulin lispro (HUMALOG) injection vial 0-6 Units, 0-6 Units, Subcutaneous, TID WC  insulin lispro (HUMALOG) injection vial 0-3 Units, 0-3 Units, Subcutaneous, Nightly  glucose (GLUTOSE) 40 % oral gel 15 g, 15 g, Oral, PRN  dextrose 50 % IV solution, 12.5 g, Intravenous, PRN  glucagon (rDNA) injection 1 mg, 1 mg, Intramuscular, PRN  dextrose 5 % solution, 100 mL/hr, Intravenous, PRN  0.9 % sodium chloride infusion, , Intravenous, Continuous  ciprofloxacin (CIPRO) IVPB 400 mg, 400 mg, Intravenous, Q24H  iopamidol (ISOVUE-300) 61 % injection 100 mL, 100 mL, Intravenous, ONCE PRN  Physical VITALS:  BP (!) 126/44   Pulse 90   Temp 97.9 °F (36.6 °C) (Oral)   Resp 16   Ht 4' 11\" (1.499 m)   Wt 131 lb 2.8 oz (59.5 kg)   LMP  (LMP Unknown)   SpO2 99%   BMI 26.49 kg/m²   Constitutional: Awake and alert in no acute distress. Lying in bed comfortably  Head: Normocephalic, atraumatic  Eyes: EOMI, PERRLA  ENT: moist mucous membranes  Neck: neck supple, trachea midline  Lungs: Good inspiratory effort, CTABL, no wheeze, no rhonchi, no rales  Heart: RRR, normal S1 and S2, no murmurs  GI: Soft, non-distended, non tender, no guarding, no rebound, +BS  MSK: no edema noted  Skin: warm, dry  Neuro: Intact motor and sensory, no focal deficits  Psych: appropriate affect     Data    CBC:   Lab Results   Component Value Date    WBC 25.8 10/06/2020    RBC 3.96 10/06/2020    HGB 9.3 10/06/2020    HCT 30.3 10/06/2020    MCV 76.4 10/06/2020    MCH 23.5 10/06/2020    MCHC 30.7 10/06/2020    RDW 23.9 10/06/2020     10/06/2020    MPV 8.8 08/01/2015     CMP:    Lab Results   Component Value Date     10/06/2020    K 4.4 10/06/2020     10/06/2020    CO2 28 10/06/2020    BUN 30 10/06/2020    CREATININE 3.19 10/06/2020    GFRAA 17.1 10/06/2020    LABGLOM 14.1 10/06/2020    GLUCOSE 130 10/06/2020    PROT 7.5 10/05/2020    LABALBU 4.0 10/05/2020    CALCIUM 9.5 10/06/2020    BILITOT <0.2 10/05/2020    ALKPHOS 134 10/05/2020    AST 37 10/05/2020    ALT 22 10/05/2020       ASSESSMENT AND PLAN      # Sepsis 2/2 UTI with history of ESBL E. Coli  - pt with urinary symptoms for last week and UA positive for UTI  - started on Rocephin. Changed to Cipro after looking at sensitivities of last ESBL UTI in 2019  - leukocytosis worsened today to 25.8 from 5  - will continue to monitor UCx and BCx    # SAÚL on CKD3/4  - baseline Cr 1.7. Cr 3.19 today  - likely 2/2 UTI and diuretic.  Holding diuretic and gentle IVF  - caution not to volume overload in setting of CHF  - will monitor renal function and replace electrolytes as

## 2020-10-06 NOTE — CARE COORDINATION
[facility] AT [place] Case Management Initial Discharge Assessment    Met with patient to discuss discharge plan. PCP: Harris Celaya MD                                Date of Last Visit: 9/22/20    If no PCP, list provided? N/A    Discharge Planning    Living Arrangements: independently at home    Who do you live with? Spouse    Who helps you with your care:  spouse    If lives at home:     Do you have any barriers navigating in your home? no    Patient can perform ADL? Yes-Sometimes  helps    Current Services (outpatient and in home) :  None    Dialysis: No    Is transportation available to get to your appointments? Yes    DME Equipment:  yes - Cane, walker, shower chair    Respiratory equipment: Nebulizer    Respiratory provider:  no     Pharmacy:  yes - [address] [place] [address] with Medication Assistance Program?  No      Patient agreeable to [place] [id number]? No but will re-consider if the need arises    Patient agreeable to SNF/Rehab? No    Other discharge needs identified? N/A    Freedom of choice list provided with basic dialogue that supports the patient's individualized plan of care/goals and shares the quality data associated with the providers. Yes    Does Patient Have a High-Risk for Readmission Diagnosis (CHF, PN, MI, COPD)? No    The plan for Transition of Care is related to the following treatment goals:Control diabetes    Initial Discharge Plan? (Note: please see concurrent daily documentation for any updates after initial note). Met with pt who reports she plans to return to home at OR. Her concern is controlling her diabetes. She does obtain all meds and see her physician as directed.     The Patient and/or patient representative: patient was provided with choice of any post-acute providers for care and equipment and agrees with discharge plan  Yes    Electronically signed by MILAN Smith on 10/6/2020 at 3:28 PM

## 2020-10-07 ENCOUNTER — APPOINTMENT (OUTPATIENT)
Dept: GENERAL RADIOLOGY | Age: 75
DRG: 871 | End: 2020-10-07
Payer: MEDICARE

## 2020-10-07 ENCOUNTER — ANESTHESIA EVENT (OUTPATIENT)
Dept: MEDSURG UNIT | Age: 75
DRG: 871 | End: 2020-10-07
Payer: MEDICARE

## 2020-10-07 ENCOUNTER — APPOINTMENT (OUTPATIENT)
Dept: CT IMAGING | Age: 75
DRG: 871 | End: 2020-10-07
Payer: MEDICARE

## 2020-10-07 ENCOUNTER — ANESTHESIA (OUTPATIENT)
Dept: MEDSURG UNIT | Age: 75
DRG: 871 | End: 2020-10-07
Payer: MEDICARE

## 2020-10-07 LAB
ANION GAP SERPL CALCULATED.3IONS-SCNC: 13 MEQ/L (ref 9–15)
ANION GAP SERPL CALCULATED.3IONS-SCNC: 13 MEQ/L (ref 9–15)
ANION GAP SERPL CALCULATED.3IONS-SCNC: 21 MEQ/L (ref 9–15)
APTT: 53.1 SEC (ref 24.4–36.8)
BASE EXCESS ARTERIAL: -6 (ref -3–3)
BASE EXCESS ARTERIAL: -6 (ref -3–3)
BASE EXCESS ARTERIAL: -7 (ref -3–3)
BASE EXCESS ARTERIAL: 1 (ref -3–3)
BUN BLDV-MCNC: 40 MG/DL (ref 8–23)
BUN BLDV-MCNC: 41 MG/DL (ref 8–23)
BUN BLDV-MCNC: 44 MG/DL (ref 8–23)
CALCIUM IONIZED: 0.82 MMOL/L (ref 1.12–1.32)
CALCIUM IONIZED: 0.87 MMOL/L (ref 1.12–1.32)
CALCIUM IONIZED: 0.89 MMOL/L (ref 1.12–1.32)
CALCIUM IONIZED: 1.04 MMOL/L (ref 1.12–1.32)
CALCIUM SERPL-MCNC: 6.1 MG/DL (ref 8.5–9.9)
CALCIUM SERPL-MCNC: 6.2 MG/DL (ref 8.5–9.9)
CALCIUM SERPL-MCNC: 8.6 MG/DL (ref 8.5–9.9)
CHLORIDE BLD-SCNC: 100 MEQ/L (ref 95–107)
CHLORIDE BLD-SCNC: 89 MEQ/L (ref 95–107)
CHLORIDE BLD-SCNC: 97 MEQ/L (ref 95–107)
CO2: 17 MEQ/L (ref 20–31)
CO2: 22 MEQ/L (ref 20–31)
CO2: 27 MEQ/L (ref 20–31)
CREAT SERPL-MCNC: 3.26 MG/DL (ref 0.5–0.9)
CREAT SERPL-MCNC: 3.42 MG/DL (ref 0.5–0.9)
CREAT SERPL-MCNC: 3.45 MG/DL (ref 0.5–0.9)
GFR AFRICAN AMERICAN: 15.6
GFR AFRICAN AMERICAN: 15.8
GFR AFRICAN AMERICAN: 16
GFR AFRICAN AMERICAN: 16.7
GFR AFRICAN AMERICAN: 17
GFR NON-AFRICAN AMERICAN: 12.9
GFR NON-AFRICAN AMERICAN: 13
GFR NON-AFRICAN AMERICAN: 13
GFR NON-AFRICAN AMERICAN: 13.8
GFR NON-AFRICAN AMERICAN: 14
GLUCOSE BLD-MCNC: 221 MG/DL (ref 60–115)
GLUCOSE BLD-MCNC: 231 MG/DL (ref 70–99)
GLUCOSE BLD-MCNC: 274 MG/DL (ref 60–115)
GLUCOSE BLD-MCNC: 385 MG/DL (ref 60–115)
GLUCOSE BLD-MCNC: 417 MG/DL (ref 60–115)
GLUCOSE BLD-MCNC: 561 MG/DL (ref 60–115)
GLUCOSE BLD-MCNC: 595 MG/DL (ref 60–115)
GLUCOSE BLD-MCNC: 595 MG/DL (ref 60–115)
GLUCOSE BLD-MCNC: 622 MG/DL (ref 60–115)
GLUCOSE BLD-MCNC: 625 MG/DL (ref 70–99)
GLUCOSE BLD-MCNC: 629 MG/DL (ref 60–115)
GLUCOSE BLD-MCNC: 641 MG/DL (ref 70–99)
GLUCOSE BLD-MCNC: 679 MG/DL (ref 70–99)
GLUCOSE BLD-MCNC: >600 MG/DL (ref 60–115)
GLUCOSE BLD-MCNC: >600 MG/DL (ref 60–115)
HBA1C MFR BLD: 11.8 % (ref 4.8–5.9)
HCO3 ARTERIAL: 19.5 MMOL/L (ref 21–29)
HCO3 ARTERIAL: 19.6 MMOL/L (ref 21–29)
HCO3 ARTERIAL: 21.3 MMOL/L (ref 21–29)
HCO3 ARTERIAL: 25.5 MMOL/L (ref 21–29)
HCT VFR BLD CALC: 29.9 % (ref 37–47)
HEMOGLOBIN: 8 GM/DL (ref 12–16)
HEMOGLOBIN: 8.8 GM/DL (ref 12–16)
HEMOGLOBIN: 9 GM/DL (ref 12–16)
HEMOGLOBIN: 9.1 G/DL (ref 12–16)
HEMOGLOBIN: 9.8 GM/DL (ref 12–16)
INR BLD: 2.3
LACTATE: 10.63 MMOL/L (ref 0.4–2)
LACTATE: 5.63 MMOL/L (ref 0.4–2)
LACTATE: 9.14 MMOL/L (ref 0.4–2)
LACTATE: 9.3 MMOL/L (ref 0.4–2)
LACTIC ACID: 11.5 MMOL/L (ref 0.5–2.2)
LACTIC ACID: 7.4 MMOL/L (ref 0.5–2.2)
LV EF: 55 %
LVEF MODALITY: NORMAL
MAGNESIUM: 1.8 MG/DL (ref 1.7–2.4)
MAGNESIUM: 1.9 MG/DL (ref 1.7–2.4)
MCH RBC QN AUTO: 23.4 PG (ref 27–31.3)
MCHC RBC AUTO-ENTMCNC: 30.4 % (ref 33–37)
MCV RBC AUTO: 76.8 FL (ref 82–100)
O2 SAT, ARTERIAL: 100 % (ref 93–100)
O2 SAT, ARTERIAL: 88 % (ref 93–100)
O2 SAT, ARTERIAL: 99 % (ref 93–100)
O2 SAT, ARTERIAL: 99 % (ref 93–100)
PCO2 ARTERIAL: 38 MM HG (ref 35–45)
PCO2 ARTERIAL: 40 MM HG (ref 35–45)
PCO2 ARTERIAL: 42 MM HG (ref 35–45)
PCO2 ARTERIAL: 49 MM HG (ref 35–45)
PDW BLD-RTO: 24.9 % (ref 11.5–14.5)
PERFORMED ON: ABNORMAL
PH ARTERIAL: 7.25 (ref 7.35–7.45)
PH ARTERIAL: 7.28 (ref 7.35–7.45)
PH ARTERIAL: 7.33 (ref 7.35–7.45)
PH ARTERIAL: 7.42 (ref 7.35–7.45)
PHOSPHORUS: 3.8 MG/DL (ref 2.3–4.8)
PHOSPHORUS: 6.6 MG/DL (ref 2.3–4.8)
PLATELET # BLD: 275 K/UL (ref 130–400)
PO2 ARTERIAL: 115 MM HG (ref 75–108)
PO2 ARTERIAL: 133 MM HG (ref 75–108)
PO2 ARTERIAL: 516 MM HG (ref 75–108)
PO2 ARTERIAL: 61 MM HG (ref 75–108)
POC CHLORIDE: 101 MEQ/L (ref 99–110)
POC CHLORIDE: 101 MEQ/L (ref 99–110)
POC CHLORIDE: 92 MEQ/L (ref 99–110)
POC CHLORIDE: 98 MEQ/L (ref 99–110)
POC CREATININE: 3.2 MG/DL (ref 0.6–1.2)
POC CREATININE: 3.3 MG/DL (ref 0.6–1.2)
POC CREATININE: 3.3 MG/DL (ref 0.6–1.2)
POC CREATININE: 3.4 MG/DL (ref 0.6–1.2)
POC FIO2: 100
POC FIO2: 50
POC FIO2: 60
POC FIO2: 60
POC HEMATOCRIT: 23 % (ref 36–48)
POC HEMATOCRIT: 26 % (ref 36–48)
POC HEMATOCRIT: 27 % (ref 36–48)
POC HEMATOCRIT: 29 % (ref 36–48)
POC POTASSIUM: 3.4 MEQ/L (ref 3.5–5.1)
POC POTASSIUM: 4.8 MEQ/L (ref 3.5–5.1)
POC POTASSIUM: 5.8 MEQ/L (ref 3.5–5.1)
POC POTASSIUM: 7 MEQ/L (ref 3.5–5.1)
POC SAMPLE TYPE: ABNORMAL
POC SODIUM: 132 MEQ/L (ref 136–145)
POC SODIUM: 134 MEQ/L (ref 136–145)
POC SODIUM: 134 MEQ/L (ref 136–145)
POC SODIUM: 139 MEQ/L (ref 136–145)
POTASSIUM REFLEX MAGNESIUM: 5.6 MEQ/L (ref 3.4–4.9)
POTASSIUM SERPL-SCNC: 3.5 MEQ/L (ref 3.4–4.9)
POTASSIUM SERPL-SCNC: 6.3 MEQ/L (ref 3.4–4.9)
PROTHROMBIN TIME: 24.8 SEC (ref 12.3–14.9)
RBC # BLD: 3.89 M/UL (ref 4.2–5.4)
SODIUM BLD-SCNC: 129 MEQ/L (ref 135–144)
SODIUM BLD-SCNC: 135 MEQ/L (ref 135–144)
SODIUM BLD-SCNC: 135 MEQ/L (ref 135–144)
TCO2 ARTERIAL: 21 (ref 22–29)
TCO2 ARTERIAL: 21 (ref 22–29)
TCO2 ARTERIAL: 23 (ref 22–29)
TCO2 ARTERIAL: 27 (ref 22–29)
WBC # BLD: 17.7 K/UL (ref 4.8–10.8)

## 2020-10-07 PROCEDURE — 4A023N7 MEASUREMENT OF CARDIAC SAMPLING AND PRESSURE, LEFT HEART, PERCUTANEOUS APPROACH: ICD-10-PCS | Performed by: INTERNAL MEDICINE

## 2020-10-07 PROCEDURE — 99291 CRITICAL CARE FIRST HOUR: CPT | Performed by: INTERNAL MEDICINE

## 2020-10-07 PROCEDURE — 82565 ASSAY OF CREATININE: CPT

## 2020-10-07 PROCEDURE — 36415 COLL VENOUS BLD VENIPUNCTURE: CPT

## 2020-10-07 PROCEDURE — C9113 INJ PANTOPRAZOLE SODIUM, VIA: HCPCS | Performed by: INTERNAL MEDICINE

## 2020-10-07 PROCEDURE — 2709999900 HC NON-CHARGEABLE SUPPLY

## 2020-10-07 PROCEDURE — 93306 TTE W/DOPPLER COMPLETE: CPT

## 2020-10-07 PROCEDURE — 83605 ASSAY OF LACTIC ACID: CPT

## 2020-10-07 PROCEDURE — 70450 CT HEAD/BRAIN W/O DYE: CPT

## 2020-10-07 PROCEDURE — 82330 ASSAY OF CALCIUM: CPT

## 2020-10-07 PROCEDURE — 71045 X-RAY EXAM CHEST 1 VIEW: CPT

## 2020-10-07 PROCEDURE — 3E073GC INTRODUCTION OF OTHER THERAPEUTIC SUBSTANCE INTO CORONARY ARTERY, PERCUTANEOUS APPROACH: ICD-10-PCS | Performed by: INTERNAL MEDICINE

## 2020-10-07 PROCEDURE — 2580000003 HC RX 258: Performed by: INTERNAL MEDICINE

## 2020-10-07 PROCEDURE — 2500000003 HC RX 250 WO HCPCS: Performed by: INTERNAL MEDICINE

## 2020-10-07 PROCEDURE — 33210 INSERT ELECTRD/PM CATH SNGL: CPT

## 2020-10-07 PROCEDURE — 82948 REAGENT STRIP/BLOOD GLUCOSE: CPT

## 2020-10-07 PROCEDURE — 94761 N-INVAS EAR/PLS OXIMETRY MLT: CPT

## 2020-10-07 PROCEDURE — 02HV33Z INSERTION OF INFUSION DEVICE INTO SUPERIOR VENA CAVA, PERCUTANEOUS APPROACH: ICD-10-PCS | Performed by: INTERNAL MEDICINE

## 2020-10-07 PROCEDURE — 85610 PROTHROMBIN TIME: CPT

## 2020-10-07 PROCEDURE — 85014 HEMATOCRIT: CPT

## 2020-10-07 PROCEDURE — B2151ZZ FLUOROSCOPY OF LEFT HEART USING LOW OSMOLAR CONTRAST: ICD-10-PCS | Performed by: INTERNAL MEDICINE

## 2020-10-07 PROCEDURE — 82435 ASSAY OF BLOOD CHLORIDE: CPT

## 2020-10-07 PROCEDURE — 93458 L HRT ARTERY/VENTRICLE ANGIO: CPT | Performed by: INTERNAL MEDICINE

## 2020-10-07 PROCEDURE — 94760 N-INVAS EAR/PLS OXIMETRY 1: CPT

## 2020-10-07 PROCEDURE — 84100 ASSAY OF PHOSPHORUS: CPT

## 2020-10-07 PROCEDURE — 0BH18EZ INSERTION OF ENDOTRACHEAL AIRWAY INTO TRACHEA, VIA NATURAL OR ARTIFICIAL OPENING ENDOSCOPIC: ICD-10-PCS | Performed by: NURSE ANESTHETIST, CERTIFIED REGISTERED

## 2020-10-07 PROCEDURE — 36556 INSERT NON-TUNNEL CV CATH: CPT

## 2020-10-07 PROCEDURE — 92950 HEART/LUNG RESUSCITATION CPR: CPT

## 2020-10-07 PROCEDURE — 36620 INSERTION CATHETER ARTERY: CPT | Performed by: INTERNAL MEDICINE

## 2020-10-07 PROCEDURE — 80048 BASIC METABOLIC PNL TOTAL CA: CPT

## 2020-10-07 PROCEDURE — 31500 INSERT EMERGENCY AIRWAY: CPT | Performed by: NURSE ANESTHETIST, CERTIFIED REGISTERED

## 2020-10-07 PROCEDURE — 31500 INSERT EMERGENCY AIRWAY: CPT

## 2020-10-07 PROCEDURE — 33212 INSERT PULSE GEN SNGL LEAD: CPT | Performed by: INTERNAL MEDICINE

## 2020-10-07 PROCEDURE — 6360000004 HC RX CONTRAST MEDICATION: Performed by: INTERNAL MEDICINE

## 2020-10-07 PROCEDURE — C1769 GUIDE WIRE: HCPCS

## 2020-10-07 PROCEDURE — 85027 COMPLETE CBC AUTOMATED: CPT

## 2020-10-07 PROCEDURE — 83036 HEMOGLOBIN GLYCOSYLATED A1C: CPT

## 2020-10-07 PROCEDURE — 5A1223Z PERFORMANCE OF CARDIAC PACING, CONTINUOUS: ICD-10-PCS | Performed by: INTERNAL MEDICINE

## 2020-10-07 PROCEDURE — 82947 ASSAY GLUCOSE BLOOD QUANT: CPT

## 2020-10-07 PROCEDURE — 36620 INSERTION CATHETER ARTERY: CPT

## 2020-10-07 PROCEDURE — 84295 ASSAY OF SERUM SODIUM: CPT

## 2020-10-07 PROCEDURE — 85730 THROMBOPLASTIN TIME PARTIAL: CPT

## 2020-10-07 PROCEDURE — 2500000003 HC RX 250 WO HCPCS

## 2020-10-07 PROCEDURE — 6360000002 HC RX W HCPCS: Performed by: STUDENT IN AN ORGANIZED HEALTH CARE EDUCATION/TRAINING PROGRAM

## 2020-10-07 PROCEDURE — 5A1945Z RESPIRATORY VENTILATION, 24-96 CONSECUTIVE HOURS: ICD-10-PCS | Performed by: NURSE ANESTHETIST, CERTIFIED REGISTERED

## 2020-10-07 PROCEDURE — 2000000000 HC ICU R&B

## 2020-10-07 PROCEDURE — 36600 WITHDRAWAL OF ARTERIAL BLOOD: CPT

## 2020-10-07 PROCEDURE — 2580000003 HC RX 258

## 2020-10-07 PROCEDURE — 37799 UNLISTED PX VASCULAR SURGERY: CPT

## 2020-10-07 PROCEDURE — 6370000000 HC RX 637 (ALT 250 FOR IP): Performed by: INTERNAL MEDICINE

## 2020-10-07 PROCEDURE — 83735 ASSAY OF MAGNESIUM: CPT

## 2020-10-07 PROCEDURE — 6360000002 HC RX W HCPCS

## 2020-10-07 PROCEDURE — 84132 ASSAY OF SERUM POTASSIUM: CPT

## 2020-10-07 PROCEDURE — 94002 VENT MGMT INPAT INIT DAY: CPT

## 2020-10-07 PROCEDURE — 36556 INSERT NON-TUNNEL CV CATH: CPT | Performed by: INTERNAL MEDICINE

## 2020-10-07 PROCEDURE — C1894 INTRO/SHEATH, NON-LASER: HCPCS

## 2020-10-07 PROCEDURE — 6360000002 HC RX W HCPCS: Performed by: INTERNAL MEDICINE

## 2020-10-07 PROCEDURE — B2111ZZ FLUOROSCOPY OF MULTIPLE CORONARY ARTERIES USING LOW OSMOLAR CONTRAST: ICD-10-PCS | Performed by: INTERNAL MEDICINE

## 2020-10-07 PROCEDURE — C1887 CATHETER, GUIDING: HCPCS

## 2020-10-07 PROCEDURE — 82803 BLOOD GASES ANY COMBINATION: CPT

## 2020-10-07 PROCEDURE — 2580000003 HC RX 258: Performed by: STUDENT IN AN ORGANIZED HEALTH CARE EDUCATION/TRAINING PROGRAM

## 2020-10-07 RX ORDER — NICOTINE POLACRILEX 4 MG
15 LOZENGE BUCCAL PRN
Status: DISCONTINUED | OUTPATIENT
Start: 2020-10-07 | End: 2020-10-07 | Stop reason: SDUPTHER

## 2020-10-07 RX ORDER — DEXTROSE MONOHYDRATE 25 G/50ML
12.5 INJECTION, SOLUTION INTRAVENOUS PRN
Status: DISCONTINUED | OUTPATIENT
Start: 2020-10-07 | End: 2020-10-07 | Stop reason: SDUPTHER

## 2020-10-07 RX ORDER — DEXTROSE MONOHYDRATE 50 MG/ML
100 INJECTION, SOLUTION INTRAVENOUS PRN
Status: DISCONTINUED | OUTPATIENT
Start: 2020-10-07 | End: 2020-10-07 | Stop reason: SDUPTHER

## 2020-10-07 RX ORDER — FUROSEMIDE 10 MG/ML
80 INJECTION INTRAMUSCULAR; INTRAVENOUS ONCE
Status: COMPLETED | OUTPATIENT
Start: 2020-10-07 | End: 2020-10-07

## 2020-10-07 RX ORDER — PROPOFOL 10 MG/ML
INJECTION, EMULSION INTRAVENOUS
Status: COMPLETED
Start: 2020-10-07 | End: 2020-10-07

## 2020-10-07 RX ORDER — EPINEPHRINE 0.1 MG/ML
SYRINGE (ML) INJECTION
Status: COMPLETED
Start: 2020-10-07 | End: 2020-10-07

## 2020-10-07 RX ORDER — DOPAMINE HYDROCHLORIDE 160 MG/100ML
INJECTION, SOLUTION INTRAVENOUS
Status: COMPLETED
Start: 2020-10-07 | End: 2020-10-07

## 2020-10-07 RX ORDER — PROMETHAZINE HYDROCHLORIDE 12.5 MG/1
12.5 TABLET ORAL EVERY 6 HOURS PRN
Status: DISCONTINUED | OUTPATIENT
Start: 2020-10-07 | End: 2020-10-20 | Stop reason: HOSPADM

## 2020-10-07 RX ORDER — PROPOFOL 10 MG/ML
10 INJECTION, EMULSION INTRAVENOUS
Status: DISCONTINUED | OUTPATIENT
Start: 2020-10-07 | End: 2020-10-09

## 2020-10-07 RX ORDER — SODIUM CHLORIDE 9 MG/ML
INJECTION, SOLUTION INTRAVENOUS ONCE
Status: COMPLETED | OUTPATIENT
Start: 2020-10-07 | End: 2020-10-07

## 2020-10-07 RX ORDER — SODIUM CHLORIDE 9 MG/ML
10 INJECTION INTRAVENOUS DAILY
Status: DISCONTINUED | OUTPATIENT
Start: 2020-10-07 | End: 2020-10-20 | Stop reason: HOSPADM

## 2020-10-07 RX ORDER — CHLORHEXIDINE GLUCONATE 0.12 MG/ML
15 RINSE ORAL 2 TIMES DAILY
Status: DISCONTINUED | OUTPATIENT
Start: 2020-10-07 | End: 2020-10-09

## 2020-10-07 RX ORDER — ONDANSETRON 2 MG/ML
4 INJECTION INTRAMUSCULAR; INTRAVENOUS EVERY 6 HOURS PRN
Status: DISCONTINUED | OUTPATIENT
Start: 2020-10-07 | End: 2020-10-20 | Stop reason: HOSPADM

## 2020-10-07 RX ORDER — 0.9 % SODIUM CHLORIDE 0.9 %
500 INTRAVENOUS SOLUTION INTRAVENOUS ONCE
Status: COMPLETED | OUTPATIENT
Start: 2020-10-07 | End: 2020-10-07

## 2020-10-07 RX ORDER — PANTOPRAZOLE SODIUM 40 MG/10ML
40 INJECTION, POWDER, LYOPHILIZED, FOR SOLUTION INTRAVENOUS DAILY
Status: DISCONTINUED | OUTPATIENT
Start: 2020-10-07 | End: 2020-10-19

## 2020-10-07 RX ORDER — DOPAMINE HYDROCHLORIDE 160 MG/100ML
5 INJECTION, SOLUTION INTRAVENOUS CONTINUOUS
Status: DISCONTINUED | OUTPATIENT
Start: 2020-10-07 | End: 2020-10-09

## 2020-10-07 RX ADMIN — SODIUM BICARBONATE: 84 INJECTION, SOLUTION INTRAVENOUS at 21:54

## 2020-10-07 RX ADMIN — DOPAMINE HYDROCHLORIDE 5 MCG/KG/MIN: 160 INJECTION, SOLUTION INTRAVENOUS at 09:51

## 2020-10-07 RX ADMIN — HYDROCORTISONE SODIUM SUCCINATE 50 MG: 100 INJECTION, POWDER, FOR SOLUTION INTRAMUSCULAR; INTRAVENOUS at 17:14

## 2020-10-07 RX ADMIN — PIPERACILLIN AND TAZOBACTAM 2.25 G: 2; .25 INJECTION, POWDER, LYOPHILIZED, FOR SOLUTION INTRAVENOUS at 18:51

## 2020-10-07 RX ADMIN — SODIUM CHLORIDE 10.71 UNITS/HR: 9 INJECTION, SOLUTION INTRAVENOUS at 13:13

## 2020-10-07 RX ADMIN — FUROSEMIDE 80 MG: 10 INJECTION, SOLUTION INTRAMUSCULAR; INTRAVENOUS at 15:30

## 2020-10-07 RX ADMIN — PROPOFOL 10 MCG/KG/MIN: 10 INJECTION, EMULSION INTRAVENOUS at 15:45

## 2020-10-07 RX ADMIN — HYDROCORTISONE SODIUM SUCCINATE 50 MG: 100 INJECTION, POWDER, FOR SOLUTION INTRAMUSCULAR; INTRAVENOUS at 23:16

## 2020-10-07 RX ADMIN — Medication 10 ML: at 17:15

## 2020-10-07 RX ADMIN — SODIUM CHLORIDE 34.86 UNITS/HR: 9 INJECTION, SOLUTION INTRAVENOUS at 21:51

## 2020-10-07 RX ADMIN — INSULIN HUMAN 10 UNITS: 100 INJECTION, SOLUTION PARENTERAL at 13:12

## 2020-10-07 RX ADMIN — CHLORHEXIDINE GLUCONATE 15 ML: 1.2 RINSE ORAL at 10:00

## 2020-10-07 RX ADMIN — CHLOROTHIAZIDE SODIUM 500 MG: 500 INJECTION, POWDER, LYOPHILIZED, FOR SOLUTION INTRAVENOUS at 15:30

## 2020-10-07 RX ADMIN — SODIUM CHLORIDE: 9 INJECTION, SOLUTION INTRAVENOUS at 12:40

## 2020-10-07 RX ADMIN — VASOPRESSIN 0.04 UNITS/MIN: 20 INJECTION INTRAVENOUS at 20:08

## 2020-10-07 RX ADMIN — VANCOMYCIN HYDROCHLORIDE 750 MG: 750 INJECTION, POWDER, LYOPHILIZED, FOR SOLUTION INTRAVENOUS at 18:53

## 2020-10-07 RX ADMIN — Medication 10 ML: at 20:05

## 2020-10-07 RX ADMIN — EPINEPHRINE 1 MG: 0.1 INJECTION, SOLUTION ENDOTRACHEAL; INTRACARDIAC; INTRAVENOUS at 10:15

## 2020-10-07 RX ADMIN — EPINEPHRINE 1 MG: 0.1 INJECTION, SOLUTION ENDOTRACHEAL; INTRACARDIAC; INTRAVENOUS at 10:45

## 2020-10-07 RX ADMIN — PANTOPRAZOLE SODIUM 40 MG: 40 INJECTION, POWDER, FOR SOLUTION INTRAVENOUS at 17:14

## 2020-10-07 RX ADMIN — PANTOPRAZOLE SODIUM 40 MG: 40 TABLET, DELAYED RELEASE ORAL at 06:29

## 2020-10-07 RX ADMIN — SODIUM CHLORIDE 500 ML: 9 INJECTION, SOLUTION INTRAVENOUS at 09:00

## 2020-10-07 RX ADMIN — SODIUM BICARBONATE: 84 INJECTION, SOLUTION INTRAVENOUS at 09:50

## 2020-10-07 RX ADMIN — SODIUM BICARBONATE: 84 INJECTION INTRAVENOUS at 17:04

## 2020-10-07 RX ADMIN — VASOPRESSIN 0.04 UNITS/MIN: 20 INJECTION INTRAVENOUS at 13:03

## 2020-10-07 RX ADMIN — IOPAMIDOL 50 ML: 612 INJECTION, SOLUTION INTRAVENOUS at 11:58

## 2020-10-07 RX ADMIN — NOREPINEPHRINE BITARTRATE 70 MCG/MIN: 1 INJECTION INTRAVENOUS at 15:40

## 2020-10-07 RX ADMIN — CHLORHEXIDINE GLUCONATE 15 ML: 1.2 RINSE ORAL at 23:15

## 2020-10-07 RX ADMIN — EPINEPHRINE 1 MG: 0.1 INJECTION, SOLUTION ENDOTRACHEAL; INTRACARDIAC; INTRAVENOUS at 10:30

## 2020-10-07 ASSESSMENT — PULMONARY FUNCTION TESTS
PIF_VALUE: 37
PIF_VALUE: 34
PIF_VALUE: 42
PIF_VALUE: 33
PIF_VALUE: 40
PIF_VALUE: 31
PIF_VALUE: 29
PIF_VALUE: 37
PIF_VALUE: 34
PIF_VALUE: 32
PIF_VALUE: 28
PIF_VALUE: 32
PIF_VALUE: 38
PIF_VALUE: 32
PIF_VALUE: 41
PIF_VALUE: 39
PIF_VALUE: 31
PIF_VALUE: 38
PIF_VALUE: 38
PIF_VALUE: 40
PIF_VALUE: 39
PIF_VALUE: 36
PIF_VALUE: 39
PIF_VALUE: 39
PIF_VALUE: 33
PIF_VALUE: 36
PIF_VALUE: 31
PIF_VALUE: 40
PIF_VALUE: 33
PIF_VALUE: 39
PIF_VALUE: 35
PIF_VALUE: 39
PIF_VALUE: 37
PIF_VALUE: 31
PIF_VALUE: 37
PIF_VALUE: 39
PIF_VALUE: 33
PIF_VALUE: 36
PIF_VALUE: 40
PIF_VALUE: 39
PIF_VALUE: 37
PIF_VALUE: 39
PIF_VALUE: 34

## 2020-10-07 ASSESSMENT — PAIN SCALES - GENERAL
PAINLEVEL_OUTOF10: 0

## 2020-10-07 NOTE — PROGRESS NOTES
Spiritual Care Services     Summary of Visit:   responded to a code blue on 4W. No family present but  contacted family. When family arrived,  led family to ICU and provided prayer and support. Spiritual Assessment/Intervention/Outcomes:    Encounter Summary  Services provided to[de-identified] Patient and family together  Support System: Spouse, Children, Family members  Continue Visiting: Yes  Complexity of Encounter: High  Length of Encounter: 2 hours     Crisis  Type: Code  Assessment: Tearful, Anxious  Intervention: Active listening, Prayer, Sustaining presence/ Ministry of presence  Outcome: Comfort, Expressed gratitude           Advance Directives (For Healthcare)  Pre-existing DNR Comfort Care/DNR Arrest/DNI Order: Yes, notify physician for order  Healthcare Directive: No, patient does not have an advance directive for healthcare treatment  Information on Healthcare Directives Requested: No  Patient Requests Assistance: No           Values / Beliefs  Do you have any ethnic, cultural, sacramental, or spiritual Islam needs you would like us to be aware of while you are in the hospital?: No    Care Plan:        [id number]   Electronically signed by Brennon Roper on 10/7/20 at 10:48 AM EDT     To reach a  for emotional and spiritual support, place an [facility] consult request.   If a  is needed immediately, dial 0 and ask to page the on-call .

## 2020-10-07 NOTE — ANESTHESIA PROCEDURE NOTES
Airway  Date/Time: 10/7/2020 8:30 AM  Urgency: emergent    Airway not difficult    General Information and Staff    Patient location during procedure: patient floor  Resident/CRNA: Elliott Bahena APRN - CRNA    Consent for Airway (if performed for an anesthetic, see related documentation for consents)  Patient identity confirmed: per hospital policy  Consent: The procedure was performed in an emergent situation. Verbal consent not obtained. Written consent not obtained. Risks and benefits: risks, benefits and alternatives were not discussed      Code status verified:yes  Indications and Patient Condition  Indications for airway management: cardio/pulmonary arrest  Spontaneous Ventilation: absent  Sedation level: deep  Preoxygenated: yes  Patient position: sniffing  Maintain C-spine precautions: neck neatrality maintained. Mask difficulty assessment: vent by bag mask + OA or adjuvant +/- NMBA    Final Airway Details  Final airway type: endotracheal airway      Successful airway: ETT  Cuffed: yes   Successful intubation technique: direct laryngoscopy  Endotracheal tube insertion site: oral  Blade: Jeronimo  Blade size: #3  ETT size (mm): 7.0  Cormack-Lehane Classification: grade IIb - view of arytenoids or posterior of glottis only  Placement verified by: chest auscultation and capnometry   Measured from: lips  ETT to lips (cm): 22  Number of attempts at approach: 1  Ventilation between attempts: bag mask    Additional Comments  Arrived to code blue to RT mask ventilating after traumatic intubation attempt. Poor dentition noted, one tooth already came loose and removed by RT. All loose teeth avoided and atraumatic intubation by me.   no

## 2020-10-07 NOTE — BRIEF OP NOTE
Section of Cardiology  Adult Brief Cardiac Cath Procedure Note        Procedure(s):  LHC, b/l coronary angio, temporary pacer    Pre-operative Diagnosis:  Shock, bradycardia    H&P Status: Completed and reviewed. Post-operative Diagnosis:      TVP placed with threshold at 0.4mv    LM Normal     LAD: vasoconstricted and + response to nitro, mid 40% stenosis    CX NOrmal     RCA known small non dominant. Findings:  See full report    Complications:  none    Primary Proceduralist:   Dr.Wes Norman DO    Plan    ICU supportive care  Change Epinephrine to Levo  Start vasopressin.        Full procedure note to follow

## 2020-10-07 NOTE — CONSULTS
mellitus with complication, without long-term current use of insulin (HCC)    Fibromyalgia    Anxiety    Smoker    Insomnia    DJD (degenerative joint disease), lumbar    Lumbosacral radiculopathy at S1    DDD (degenerative disc disease), lumbar    Dysphonia    CTS (carpal tunnel syndrome)    High risk medication use-Norco - 12/20/17 OARRS PM&R, 02/20/18 OARRS PM&R, 03/07/18 OARRS PM&R, Urine Drug screen negative 02/06/17 PM&R--MED CONTRACT 2/6/17    SOB (shortness of breath) on exertion    Chest pain    Memory deficit    Artificial lens present    Presbyopia    Astigmatism, regular    Cataract, nuclear sclerotic senile    IDDM (insulin dependent diabetes mellitus)    Regular astigmatism    Nuclear senile cataract    Neck pain    Lumbosacral radiculopathy at L5    Spinal stenosis of lumbar region with neurogenic claudication    Impaired mobility and activities of daily living    Non-compliant patient NO showed FU 1/10/17 Dr Erik Fonseca Insomnia secondary to chronic pain    Reactive depression    Diabetic radiculopathy ([facility])    Cervical radicular pain    Diabetic asymmetric polyneuropathy ([facility])    Myalgia    Intercostal neuropathy    Osteoarthritis of spine with radiculopathy, lumbar region    Acute combined systolic and diastolic CHF, NYHA class 1 ([facility])    PMB (postmenopausal bleeding)    Acute on chronic diastolic heart failure ([facility])    CHF (congestive heart failure) ([facility])    Hypertensive urgency    Uncontrolled type 2 diabetes mellitus with hyperglycemia ([facility])    Chronic obstructive pulmonary disease with acute exacerbation ([facility])    Acute on chronic diastolic (congestive) heart failure ([address]    SAÚL (acute kidney injury) ([address]    Hypoglycemia    HOCM (hypertrophic obstructive cardiomyopathy) ([facility])    Gastrointestinal hemorrhage    COPD exacerbation ([facility])    Anemia    AVM (arteriovenous malformation)    Symptomatic anemia    Flash pulmonary edema ([facility])    Acute on chronic kidney failure (HCC)    Dysarthria    Chronic fatigue    Acute encephalopathy    Adenomatous polyp of sigmoid colon    Adenomatous polyp of transverse colon    Sepsis (HCC)    Major depressive disorder in remission ([facility]    Gastroesophageal reflux disease without esophagitis    Acute cystitis without hematuria    CKD (chronic kidney disease) stage 4, GFR 15-29 ml/min [facility])       Past Surgical History:   Procedure Laterality Date    BACK SURGERY  2017    lumbar disc    CARDIAC CATHETERIZATION  11/3/14     DR. MIRELES / no stents    COLONOSCOPY  08/29/2016    w/polypectomy     COLONOSCOPY N/A 9/29/2020    COLONOSCOPY WITH POLYPECTOMY performed by Jeremy Davis MD at [facility] N/A 10/7/2019    EUA HYSTEROSCOPY DILATATION AND CURETTAGE performed by Pilar Mcmahan DO at [facility]. [address], COLON, DIAGNOSTIC      EYE SURGERY      [place] with IOL OU / [address]    umbilical hernia repair    CT ESOPHAGOGASTRODUODENOSCOPY TRANSORAL DIAGNOSTIC N/A 3/24/2017    EGD ESOPHAGOGASTRODUODENOSCOPY performed by Marjorie Le MD at [place] [address] 2/8/2018    negative findings    CT REVISE MEDIAN N/CARPAL TUNNEL SURG Left 6/5/2017    LEFT  CARPAL TUNNEL RELEASE performed by Saqib Harrison MD at [facility],5+T/N,FELISHA N/A 2/8/2018    TONSILLECTOMY      as child    UPPER GASTROINTESTINAL ENDOSCOPY  08/26/2016    w/bx     UPPER GASTROINTESTINAL ENDOSCOPY N/A 2/25/2020    EGD possible biopsy performed by Jeremy Davis MD at [address] 7/1/2020    EGD PUSH ENTEROSCOPY performed by José Miguel Muñoz MD at [address] [place] [address] Marital status:      Spouse name: None    Number of children: None    Years of education: None    Highest education level: None Occupational History    Occupation: Retired-   Social Needs    Financial resource strain: None    Food insecurity     Worry: None     Inability: None    Transportation needs     Medical: None     Non-medical: None   Tobacco Use    Smoking status: Former Smoker     Packs/day: 1.00     Years: 59.00     Pack years: 59.00     Types: Cigarettes     Start date: 2017    Smokeless tobacco: Never Used    Tobacco comment: quit 2-3 weeks ago    Substance and Sexual Activity    Alcohol use: Not Currently    Drug use: No    Sexual activity: Yes   Lifestyle    Physical activity     Days per week: None     Minutes per session: None    Stress: None   Relationships    Social connections     Talks on phone: None     Gets together: None     Attends [employer] service: None     Active member of club or organization: None     Attends meetings of clubs or organizations: None     Relationship status: None    Intimate partner violence     Fear of current or ex partner: None     Emotionally abused: None     Physically abused: None     Forced sexual activity: None   Other Topics Concern    None   Social History Narrative    None       Family History   Problem Relation Age of Onset    Heart Disease Father         cardiac bypass    Arthritis Father     Arthritis Mother     Other Mother          at age 80    Other Sister         [facility]    No Known Problems Daughter     Stroke Son        Current Facility-Administered Medications   Medication Dose Route Frequency Provider Last Rate Last Dose    0.9 % sodium chloride bolus  500 mL Intravenous Once Carri Shah DO        propofol 1000 MG/100ML injection             propofol injection  10 mcg/kg/min Intravenous Titrated Susan Sprague MD        fentaNYL (SUBLIMAZE) 1,000 mcg in sodium chloride 0.9 % 100 mL infusion  25 mcg/hr Intravenous Continuous Susan Sprague MD        sodium bicarbonate 150 mEq in dextrose 5 % 1,000 mL infusion   Intravenous Continuous Makeda Weber  mL/hr at 10/07/20 0950      0.9 % sodium chloride infusion   Intravenous Once Makeda Weber MD        0.9 % sodium chloride infusion   Intravenous Once Makeda Weber MD        promethazine (PHENERGAN) tablet 12.5 mg  12.5 mg Oral Q6H PRN Makeda Weber MD        Or    ondansetron (ZOFRAN) injection 4 mg  4 mg Intravenous Q6H PRN Makeda Weber MD        chlorhexidine (PERIDEX) 0.12 % solution 15 mL  15 mL Mouth/Throat BID Makeda Weber MD        DOPamine (INTROPIN) 400 mg in dextrose 5 % 250 mL infusion  5 mcg/kg/min Intravenous Continuous Makeda Weber MD 11.2 mL/hr at 10/07/20 0951 5 mcg/kg/min at 10/07/20 0951    EPINEPHrine 1 MG/10ML injection             EPINEPHrine 1 MG/10ML injection             EPINEPHrine 1 MG/10ML injection             ondansetron (ZOFRAN) injection 4 mg  4 mg Intravenous J8J PRN Ramsey Simpson, DO   4 mg at 22/09/87 1437    carvedilol (COREG) tablet 50 mg  50 mg Oral BID Ronobir Tatiana, DO   50 mg at 32/94/63 2141    dicyclomine (BENTYL) capsule 10 mg  10 mg Oral 4x Daily PRN Brendan Dewey, DO        digoxin (LANOXIN) tablet 125 mcg  125 mcg Oral Daily Ronobir Tatiana, DO   320 mcg at 10/06/20 0829    hydrALAZINE (APRESOLINE) tablet 100 mg  100 mg Oral BID Ronobir Tatiana, DO   651 mg at 10/06/20 2141    insulin glargine (LANTUS) injection vial 60 Units  60 Units Subcutaneous Nightly Ronobir Tatiana, DO        insulin lispro (HUMALOG) injection vial 20 Units  20 Units Subcutaneous TID WC Ronobir Tatiana, DO   20 Units at 10/06/20 1635    pantoprazole (PROTONIX) tablet 40 mg  40 mg Oral QAM AC Ronobir Tatiana, DO   40 mg at 27/34/88 0629    PARoxetine (PAXIL) tablet 40 mg  40 mg Oral QAM Kendallobir Tatiana, DO   40 mg at 43/61/38 0828    rosuvastatin (CRESTOR) tablet 40 mg  40 mg Oral QPM Ronobir Tatiana, DO   40 mg at 57/47/69 5655    verapamil (CALAN SR) extended release tablet 120 mg  120 mg Oral Nightly Ronobir Tatiana, DO   807 mg at 10/06/20 2140    sodium chloride flush 0.9 % injection 10 mL  10 mL Intravenous 2 times per day Brendan Dewey, DO        sodium chloride flush 0.9 % injection 10 mL  10 mL Intravenous PRN Ronobir Tatiana, DO        acetaminophen (TYLENOL) tablet 650 mg  650 mg Oral E3V PRN Ronobir Tatiana, DO        Or    acetaminophen (TYLENOL) suppository 650 mg  650 mg Rectal R3K PRN Ronobir Tatiana, DO        enoxaparin (LOVENOX) injection 30 mg  30 mg Subcutaneous Daily Ronobir Tatiana, DO   30 mg at 64/73/19 0829    insulin lispro (HUMALOG) injection vial 0-6 Units  0-6 Units Subcutaneous TID WC Ronobir Tatiana, DO   2 Units at 10/06/20 1214    insulin lispro (HUMALOG) injection vial 0-3 Units  0-3 Units Subcutaneous Nightly Ronobir Tatiana, DO        glucose (GLUTOSE) 40 % oral gel 15 g  15 g Oral PRN Ronobir Tatiana, DO        dextrose 50 % IV solution  12.5 g Intravenous PRN Ronobir Tatiana, DO        glucagon (rDNA) injection 1 mg  1 mg Intramuscular PRN Ronobir Tatiana, DO        dextrose 5 % solution  100 mL/hr Intravenous PRN Raulr Tatiana, DO        0.9 % sodium chloride infusion   Intravenous Continuous Ronobir Tatiana, DO 75 mL/hr at 10/06/20 1951      ciprofloxacin (CIPRO) IVPB 400 mg  400 mg Intravenous Q24H Erwin Soto, DO   Stopped at 10/06/20 1148    iopamidol (ISOVUE-300) 61 % injection 100 mL  100 mL Intravenous ONCE PRN DIANE Menendez           ALLERGIES: Ibuprofen; Metformin and related; and Darvon [propoxyphene hcl]    Review of Systems   Unable to perform ROS: Intubated         VITALS:  Blood pressure (!) 147/57, pulse 55, temperature 97.7 °F (36.5 °C), resp. rate 18, height 4' 11\" (1.499 m), weight 131 lb 2.8 oz (59.5 kg), SpO2 100 %, not currently breastfeeding. Body mass index is 26.49 kg/m². Physical Exam   HENT:   Head: Normocephalic and atraumatic. Neck: Neck supple. No JVD present. No tracheal deviation present.  No thyromegaly present. Cardiovascular: Normal rate, regular rhythm, normal heart sounds and intact distal pulses. PMI is not displaced. Exam reveals no gallop, no S3, no distant heart sounds and no friction rub. No murmur heard. Pulmonary/Chest: No respiratory distress. She has no wheezes. She has no rales. She exhibits no tenderness. Abdominal: Soft. Bowel sounds are normal. She exhibits no distension and no mass. There is no abdominal tenderness. There is no rebound and no guarding. Musculoskeletal:         General: No edema. Neurological: No cranial nerve deficit. Skin: Skin is warm and dry. No rash noted. She is not diaphoretic. No erythema. No pallor.        LABS:  Recent Results (from the past 24 hour(s))   POCT Glucose    Collection Time: 10/06/20 11:02 AM   Result Value Ref Range    POC Glucose 216 (H) 60 - 115 mg/dl    Performed on ACCU-CHEK    POCT Glucose    Collection Time: 10/06/20  4:27 PM   Result Value Ref Range    POC Glucose 133 (H) 60 - 115 mg/dl    Performed on ACCU-CHEK    POCT Glucose    Collection Time: 10/06/20  8:33 PM   Result Value Ref Range    POC Glucose 81 60 - 115 mg/dl    Performed on ACCU-CHEK    POCT Glucose    Collection Time: 10/06/20  9:24 PM   Result Value Ref Range    POC Glucose 114 60 - 115 mg/dl    Performed on ACCU-CHEK    POCT Glucose    Collection Time: 10/07/20  6:33 AM   Result Value Ref Range    POC Glucose 221 (H) 60 - 115 mg/dl    Performed on ACCU-CHEK    CBC    Collection Time: 10/07/20  6:56 AM   Result Value Ref Range    WBC 17.7 (H) 4.8 - 10.8 K/uL    RBC 3.89 (L) 4.20 - 5.40 M/uL    Hemoglobin 9.1 (L) 12.0 - 16.0 g/dL    Hematocrit 29.9 (L) 37.0 - 47.0 %    MCV 76.8 (L) 82.0 - 100.0 fL    MCH 23.4 (L) 27.0 - 31.3 pg    MCHC 30.4 (L) 33.0 - 37.0 %    RDW 24.9 (H) 11.5 - 14.5 %    Platelets 064 335 - 123 K/uL   Basic Metabolic Panel w/ Reflex to MG    Collection Time: 10/07/20  6:56 AM   Result Value Ref Range    Sodium 135 135 - 144 mEq/L    Potassium reflex Magnesium 5.6 (H) 3.4 - 4.9 mEq/L    Chloride 100 95 - 107 mEq/L    CO2 22 20 - 31 mEq/L    Anion Gap 13 9 - 15 mEq/L    Glucose 231 (H) 70 - 99 mg/dL    BUN 44 (H) 8 - 23 mg/dL    CREATININE 3.45 (H) 0.50 - 0.90 mg/dL    GFR Non-African American 12.9 (L) >60    GFR  15.6 (L) >60    Calcium 8.6 8.5 - 9.9 mg/dL   POCT Glucose    Collection Time: 10/07/20  8:05 AM   Result Value Ref Range    POC Glucose 274 (H) 60 - 115 mg/dl    Performed on ACCU-CHEK    POCT Arterial    Collection Time: 10/07/20  9:16 AM   Result Value Ref Range    POC Sodium 139 136 - 145 mEq/L    POC Potassium 5.8 (H) 3.5 - 5.1 mEq/L    POC Chloride 101 99 - 110 mEq/L    POC Glucose 385 (H) 60 - 115 mg/dl    POC Creatinine 3.4 (H) 0.6 - 1.2 mg/dL    GFR Non-African American 13 (A) >60    GFR  16 (A) >60    Calcium, Ion 1.04 (L) 1.12 - 1.32 mmol/L    pH, Arterial 7.246 (L) 7.350 - 7.450    pCO2, Arterial 49 (H) 35 - 45 mm Hg    pO2, Arterial 516 (HH) 75 - 108 mm Hg    HCO3, Arterial 21.3 21.0 - 29.0 mmol/L    Base Excess, Arterial -6 (L) -3 - 3    O2 Sat, Arterial 100 (HH) 93 - 100 %    TCO2, Arterial 23 22 - 29    Lactate 9.14 (HH) 0.40 - 2.00 mmol/L    POC Hematocrit 27 (L) 36 - 48 %    Hemoglobin 9.0 (L) 12.0 - 16.0 gm/dL    FIO2 100.000     Sample Type ART     Performed on SEE BELOW      Troponin:   Lab Results   Component Value Date    TROPONINI 0.041 10/05/2020       EKG: normal sinus rhythm, lvh, LATERAL ST DEPRESSION      ASSESSMENT:    Status post cardiopulmonary arrest questionable secondary to septic shock versus cardiac ischemia versus other    Sinus frantz    Urosepsis/septic shock    History of hypertrophic obstructive cardiomyopathy    History of mild CAD    Chronic kidney disease    History of anemia    Normal LV function    Hx of HTN    HLP      PLAN:   1. As always, aggressive risk factor modification is strongly recommended.  We should adhere to the JNC VIII guidelines for HTN management and the NCEPATP III guidelines for LDL-C management. 2. STAT echo  3. EMERGENT LHC AND TVP GIVEN HYPOTENSION, FRANCES AND ABN EKG. 4. CONT WITH PRESSOR SUPPORT, IVF  5. CORRECT HYPERKALEMIA  6. DC Coreg and Verapamil given hypotension and bradycardia. 7. ICU SUPPORTIVE CARE AND MANAGEMENT  8. GI/DVT PROPH    Thank you for allowing me to participate in the care of your patient, please don't hesitate to contact me if you have any further questions.     Electronically signed by Jatin Hammond DO on 10/7/2020 at 11:05 AM

## 2020-10-07 NOTE — SIGNIFICANT EVENT
Notified by nursing this AM that patient was feeling lethargic and that BP was low 80s/40s and repeat was 70s/40s. Ordered 500cc NS bolus over 2 hours and about an hour into the bolus, rapid response was called at around 8am as patient was not responding and HR noted to be in the 20s on tele. Arrived to room and patient was unresponsive, not tracking and not following commands. Pt was attached to the monitor and noted to be sinus bradycardia in the 40s. Pt noted to have agonal breathing and SpO2 was reading in the 60s. Pulse was not able to be palpated and code blue was called and ACLS was started. Pt was given a total of 3 rounds of epinephrine. Pulse palpated but very light and bradycardic. Pt given atropine. Pulse was lost again and CPR resumed. Pt also received one amp of bicarb. Anesthesia was at bedside as patient was difficult to bag and intubated the patient after etomidate was given due to patient clenching her jaw. Pt with copious white and bloody frothy secretions. ROSC obtained after 20min of ACLS to sinus frantz and another dose of atropine was given. Pulse strong and BP noted to be 147/57. NS bolus started and patient transported to the ICU. En route to the ICU, patient lost pulse again. Compressions started as patient rolled into ICU-8. Two more doses of epi were administered and ROSC was obtained again at 08:45. Dr. Shahla Gardner at bedside. Central line and arterial line placed. ABG and STAT CXR ordered. Pt placed on the ventilator. Given insulin and D50 for hyperkalemia.     Lashell Underwood,   Internal Medicine

## 2020-10-07 NOTE — PROGRESS NOTES
Took report from Lauri Gonzalez RN at shift change. She told this nurse that patient's BP had been in 70s/30s and that she informed Dr MOSQUERA [facility] [employer] who ordered a 500 mL NS bolus. A new IV had just been obtained to give it. Pt was in bed with eyes closed, but opened them to voice and was oriented x4. PCA Amado Perez came and told me that BP was 73/48. I went to room and patient was restless in bed. She wasa visibly sweating and said\" I'm so hot\". Called for Esme Sarmiento LPN to assist. Obtained Blood glucose which was 274. Pulse ox was 98. Charleen Rosario RN who was passing by room to call a Rapid. Monitor showed pt HR 27. Life PAC applied and Dr [facility] [employer] to room. Code Blue was called. CPR started. Meds given as ordered. Intubated. Pulse obtained. HR in 40s-low 50s. Pt transferred to ICU. Pulse lost in transit. CPR started again. Pulse obtained and pt moved to ICU bed. Report given to Nurse at bedside.   Electronically signed by Yossi Weir RN on 10/7/2020 at 9:18 AM

## 2020-10-07 NOTE — CONSULTS
Pulmonary and Critical Care Medicine  Consult Note  Encounter Date: 10/7/2020 12:14 PM    Ms. Dell Grewal is a 67 y.o. female  : 1945  Requesting Provider: Noah Mendez DO    Reason for request: Shock and cardiac arrest            HISTORY OF PRESENT ILLNESS:    Patient is 67 y.o. presents with cardiac arrest, patient came to ICU post cardiac arrest on the floor, she was coded for almost an hour, on arrival to ICU she coded again we had to do CPR for almost 15 minutes, she received 3 epis, bicarb, insulin with D50, with return of spontaneous circulation, emergent central line and arterial line placed, she was started on epi drip and dopamine, emergent echo was done no pericardial effusion, right ventricle is not dilated to indicate PE, cardiology saw patient at bedside, she was taken for urgent pacemaker and left heart cath no significant atherosclerotic disease noted however she did have spasms but good ejection fraction. Patient continues to be in shock with low blood pressure. Past Medical History:        Diagnosis Date    Anxiety     Asthma     dx  / has smoked since age 14    CHF (congestive heart failure) (HCC)     Chronic back pain     Bilateral L5 S1 Radic on emg--surprisingly worse on the left than the right--pt's symptoms and her MRI show worse on the right    Chronic obstructive pulmonary disease with acute exacerbation ([address] 10/12/2019    Depression     Fibromyalgia     Hyperlipidemia     meds > 8 yrs    Hypertension     meds > 45 yrs    On home O2     2l per n/c at bedtime mostly,     Osteoarthritis     Type II diabetes mellitus, uncontrolled ([address]     hx > 8 yrs    Unspecified sleep apnea        Past Surgical History:        Procedure Laterality Date    BACK SURGERY  2017    lumbar disc    CARDIAC CATHETERIZATION  11/3/14     DR. MIRELES / no stents    COLONOSCOPY  2016    w/polypectomy     COLONOSCOPY N/A 2020    COLONOSCOPY WITH POLYPECTOMY performed by Cyndee Foss MD at [facility] N/A 10/7/2019    EUA HYSTEROSCOPY DILATATION AND CURETTAGE performed by Librado Crouch DO at [facility]. [address], COLON, DIAGNOSTIC      EYE SURGERY      [place] with IOL OU / [address]  [address]    umbilical hernia repair    OK ESOPHAGOGASTRODUODENOSCOPY TRANSORAL DIAGNOSTIC N/A 3/24/2017    EGD ESOPHAGOGASTRODUODENOSCOPY performed by Jian Schreiber MD at [facility] N/A 2018    negative findings    OK REVISE MEDIAN N/CARPAL TUNNEL SURG Left 2017    LEFT  CARPAL TUNNEL RELEASE performed by Kadeem Capone MD at [facility] VYJJ,0+J/W,TQTGG N/A 2018    TONSILLECTOMY      as child    UPPER GASTROINTESTINAL ENDOSCOPY  2016    w/bx     UPPER GASTROINTESTINAL ENDOSCOPY N/A 2020    EGD possible biopsy performed by Cyndee Foss MD at [address] [place] [address] 2020    EGD PUSH ENTEROSCOPY performed by Milagros Duff MD at [facility] History:     reports that she has quit smoking. Her smoking use included cigarettes. She started smoking about 2 years ago. She has a 59.00 pack-year smoking history. She has never used smokeless tobacco. She reports previous alcohol use. She reports that she does not use drugs.     Family History:       Problem Relation Age of Onset    Heart Disease Father         cardiac bypass    Arthritis Father     Arthritis Mother     Other Mother          at age 80    Other Sister         [facility] health hx    No Known Problems Daughter     Stroke Son        Allergies:  Ibuprofen; Metformin and related; and Darvon [propoxyphene hcl]        MEDICATIONS during current hospitalization:    Continuous Infusions:   propofol      fentaNYL (SUBLIMAZE) infusion      sodium bicarbonate infusion 150 mL/hr at 10/07/20 0950    sodium chloride      sodium chloride      DOPamine 5 mcg/kg/min (10/07/20 0951)    dextrose      sodium chloride 75 mL/hr at 10/06/20 1951       Scheduled Meds:   sodium chloride  500 mL Intravenous Once    propofol        chlorhexidine  15 mL Mouth/Throat BID    EPINEPHrine        EPINEPHrine        EPINEPHrine        digoxin  125 mcg Oral Daily    hydrALAZINE  100 mg Oral BID    insulin glargine  60 Units Subcutaneous Nightly    insulin lispro  20 Units Subcutaneous TID WC    pantoprazole  40 mg Oral QAM AC    PARoxetine  40 mg Oral QAM    rosuvastatin  40 mg Oral QPM    sodium chloride flush  10 mL Intravenous 2 times per day    enoxaparin  30 mg Subcutaneous Daily    insulin lispro  0-6 Units Subcutaneous TID WC    insulin lispro  0-3 Units Subcutaneous Nightly    ciprofloxacin  400 mg Intravenous Q24H       PRN Meds:promethazine **OR** ondansetron, ondansetron, dicyclomine, sodium chloride flush, acetaminophen **OR** acetaminophen, glucose, dextrose, glucagon (rDNA), dextrose, iopamidol        REVIEW OF SYSTEMS:  ROS: 10 organs review of system is done including general, psychological, ENT, hematological, endocrine, respiratory, cardiovascular, gastrointestinal, musculoskeletal, neurological,  allergy and Immunology is done and is otherwise negative. PHYSICAL EXAM:    Vitals:  BP (!) 147/57   Pulse 55   Temp 97.7 °F (36.5 °C)   Resp 18   Ht 4' 11\" (1.499 m)   Wt 131 lb 2.8 oz (59.5 kg)   LMP  (LMP Unknown)   SpO2 100%   BMI 26.49 kg/m²     General: Unresponsive, on vent  Head: normocephalic, atraumatic  Eyes: Dilated, right pupil is larger, nonreactive  ENT:  MMM no lesions, ET tube in  Neck:  supple and no masses  Chest : Good air movement no rales, nontender, tympanic  Heart[de-identified] Heart sounds are normal.  Regular rate and rhythm without murmur, gallop or rub.   ABD:  symmetric, soft, non-tender, no guarding  Musculoskeletal : no cyanosis, no clubbing and no edema  Neuro:  Unresponsive post cardiac arrest  Skin: No rashes or nodules noted. Lymph node:  no cervical nodes  Urology: No Sanchez   Psychiatric: unresponsive        Data Review  Recent Labs     10/05/20  2215 10/06/20  0625 10/07/20  0656 10/07/20  0916   WBC 5.5 25.8* 17.7*  --    HGB 11.1* 9.3* 9.1* 9.0*   HCT 35.8* 30.3* 29.9*  --     244 275  --       Recent Labs     10/05/20  2215  10/06/20  0625 10/07/20  0656 10/07/20  0916   *  --  144 135  --    K 4.8  --  4.4 5.6*  --    CL 93*  --  105 100  --    CO2 24  --  28 22  --    BUN 26*  --  30* 44*  --    CREATININE 2.79*   < > 3.19* 3.45* 3.4*   GLUCOSE 376*  --  130* 231*  --     < > = values in this interval not displayed.        MV Settings:     Vt Ordered: 320 mL  Rate Set: 20 bmp  PEEP/CPAP: 5  Peak Inspiratory Pressure: 34 cmH2O  Mean Airway Pressure: 12 cmH20  I:E Ratio: 1:2.70    ABGs:   Recent Labs     10/07/20  0916   PHART 7.246*   HNP0QMQ 49*   PO2ART 516*   UAK4FBR 21.3   BEART -6*   F7LQGUBC 100*   IZW1BGJ 23     O2 Device: Bag valve  O2 Flow Rate (L/min): 15 L/min  Lab Results   Component Value Date    LACTA 2.3 10/06/2020    LACTA 1.3 08/25/2020    LACTA 4.9 06/29/2020       Radiology  I personally reviewed imaging studies and no infiltrate no pneumothorax    Echo reviewed no tamponade, right ventricle not dilated    Assessment, plan:   Patient is at risk due to    · Post cardiac arrest  · septic shock with multiorgan failure  · PE is unlikely right ventricle seems to be normal on echo  · ACS ruled out left heart cath mainly showed vasoconstriction in the LAD  · Severe bradycardia secondary to septic shock, status post temporary pacemaker  · Acute kidney injury  · Mild hyperkalemia corrected  · Hyperglycemia     Recommendation  · Vent support lung protective strategy  · head of the bed 30°  · Daily sedation holidays and breathing trials  · Sedation with combination propofol and fentanyl target R ASS of 0 to -1  · Watch for ICU delirium: TV on, natural light, avoid benzos, pain control, early mobility, and family engagement  · PUD prophylaxis  · DVT prophylaxis  · Zosyn  · 1 dose vancomycin  · Panculture  · Levophed and dopamine target negative pressure 65-70  · Add vasopressin  · Monitor blood sugar target 140 to 180 if needed will use insulin drip  · Continue bicarb drip  · Sanchez catheter monitor urine output might need dialysis  · Hypothermia will be initiated when patient hemodynamically stabilizes  · ET tube retracted 2 cm  · Solu-Cortef 50 every 6 hours  · Chest x-ray tomorrow    Due to the immediate potential for life-threatening deterioration due to cardiac arrest and shock, I spent 55 minutes providing critical care. This time is excluding time spent performing procedures.       Thank you for consultation    Electronically signed by Alan Munoz MD, [facility],  on 10/7/2020 at 12:14 PM

## 2020-10-07 NOTE — PROGRESS NOTES
Rapid response called 8:05    BP:111/27 Hr 27 Accucheck 247 pulse ox 97    8:07  [facility] OF [facility] Records [facility] responded  08:09  No pulse felt  08:10 Compressions started  08:12 1 mg Epi given   08:16 pulse check no pulse  1 mg epi given  Compressions restarted  08:17 0.5 mg Atropine given  08:20 Pulse check , no pulse Compression restarted  08:21 Epi given   Anesthesia responded  08:24 Bicarb given Pulse check, no pulse Compression   08:25 1 mg epi given  08:26 etomidate 10 cc given  08:28 pulse felt/ compressions paused  0.5 mg Atropine in   08:29 VS: 147/57 HR 54  08:30 Anesthesia and Respiratory intubating 7.5 Tube in Good color change 23 lip length  08:31 NS saline bolus hanging  08:32 Pulse still strong HR:  53  08:34 ICU transfering     Dr. Birgit Bolaños / Gail Melchor respiratory   [facility] ICU  [facility]  Nicolette Corral supervisor  Keven Umanzor Nursing Director    Patient lost 2 teeth during intubating, Patient clinched down   Marie Yeh supervisor bringing patient's belonging to ICU. 2 pairs of glasses, slippers,bag of belongings and patient's 2 teeth.

## 2020-10-07 NOTE — CONSULTS
[facility]  Consult Note           Reason for Consult:  SAÚL on CKD  Requesting Physician:  Dr. Vaishali Villarreal    Chief Complaint:  dysuria  History Obtained From: electronic medical record    History of Present Ilness:    67y.o. year old female with history s/f T2DM, AMY, HTN, HLD, depression, fibromyalgia, chronic back pain, CHF, COPD and CKD stage III who presented for dysuria. Was being rx'd for UTI however pt suffered cardiac arrest today w/ ROSC after ~1 hr w/ another arrest in the ICU w/ ROSC after ~15 minutes. Pt now on pressors. S/p LHC and PM placement w/ no significant CAD noted. K up to 7 w/ worsening renal function for which nephrology was consulted. Baseline Scr ~1.1-1.2 w/ eGFR mid/high 50s. Past Medical History:        Diagnosis Date    Anxiety     Asthma     dx 2019 / has smoked since age 14    [facility]cong[facility] heart failure) ([facility]     Chronic back pain     Bilateral L5 S1 Radic on emg--surprisingly worse on the left than the right--pt's symptoms and her MRI show worse on the right    Chronic obstructive pulmonary disease with acute exacerbation ([address] 10/12/2019    Depression     Fibromyalgia     Hyperlipidemia     meds > 8 yrs    Hypertension     meds > 45 yrs    On home O2     2l per n/c at bedtime mostly,     Osteoarthritis     Type II diabetes mellitus, uncontrolled ([address]     hx > 8 yrs    Unspecified sleep apnea        Past Surgical History:        Procedure Laterality Date    BACK SURGERY  2017    lumbar disc    CARDIAC CATHETERIZATION  11/3/14     DR. MIRELES / no stents    COLONOSCOPY  08/29/2016    w/polypectomy     COLONOSCOPY N/A 9/29/2020    COLONOSCOPY WITH POLYPECTOMY performed by Kellen Alvarez MD at [facility] N/A 10/7/2019    EUA HYSTEROSCOPY DILATATION AND CURETTAGE performed by Dariel Valenzuela DO at [facility]. [address], COLON, DIAGNOSTIC      EYE SURGERY      [place] with IOL OU / [place] HERNIA REPAIR  1075    umbilical hernia repair    HI ESOPHAGOGASTRODUODENOSCOPY TRANSORAL DIAGNOSTIC N/A 3/24/2017    EGD ESOPHAGOGASTRODUODENOSCOPY performed by [facility] MD at [facility] N/A 2/8/2018    negative findings    HI REVISE MEDIAN N/CARPAL TUNNEL SURG Left 6/5/2017    LEFT  CARPAL TUNNEL RELEASE performed by Delio Turner MD at [facility],3+I/V,GSYYI N/A 2/8/2018    TONSILLECTOMY      as child    UPPER GASTROINTESTINAL ENDOSCOPY  08/26/2016    w/bx     UPPER GASTROINTESTINAL ENDOSCOPY N/A 2/25/2020    EGD possible biopsy performed by Argelia Cutler MD at [facility] 7/1/2020    EGD PUSH ENTEROSCOPY performed by Immanuel Flor MD at [facility] Medications:    No current facility-administered medications on file prior to encounter.       Current Outpatient Medications on File Prior to Encounter   Medication Sig Dispense Refill    insulin glargine (LANTUS) 100 UNIT/ML injection vial 60 units at bedtime 3 vial 3    insulin lispro (HUMALOG) 100 UNIT/ML injection vial 20 units at each mels 1 vial 3    ACCU-CHEK PHYLLIS PLUS strip Test 3x daily Dx E11.65 100 each 3    rosuvastatin (CRESTOR) 40 MG tablet Take 1 tablet by mouth every evening 90 tablet 0    carvedilol (COREG) 25 MG tablet Take 2 tablets by mouth 2 times daily 60 tablet 3    verapamil (CALAN SR) 120 MG extended release tablet Take 1 tablet by mouth nightly 30 tablet 3    PARoxetine (PAXIL) 40 MG tablet TAKE 1 TABLET BY MOUTH ONCE DAILY IN THE MORNING (Patient taking differently: Take 40 mg by mouth every morning TAKE 1 TABLET BY MOUTH ONCE DAILY IN THE MORNING) 90 tablet 0    digoxin (LANOXIN) 125 MCG tablet Take 1 tablet by mouth daily 90 tablet 1    hydrALAZINE (APRESOLINE) 100 MG tablet Take 100 mg by mouth 2 times daily      pantoprazole (PROTONIX) 40 MG tablet Take 40mg twice daily (1st dose 30min before breakfast) for 30 days.  After 30 days, you make take 40mg once daily before breakfast. 60 tablet 3    furosemide (LASIX) 40 MG tablet Take 1 tablet by mouth daily 90 tablet 0    dicyclomine (BENTYL) 10 MG capsule Take 1 capsule by mouth 4 times daily as needed (abdominal pain) 120 capsule 3    ondansetron (ZOFRAN) 4 MG tablet Take 1 tablet by mouth every 8 hours as needed for Nausea or Vomiting 30 tablet 2       Allergies:  Ibuprofen; Metformin and related; and Darvon [propoxyphene hcl]    Social History:    Social History     Socioeconomic History    Marital status:      Spouse name: Not on file    Number of children: Not on file    Years of education: Not on file    Highest education level: Not on file   Occupational History    Occupation: Retired-   Social Needs    Financial resource strain: Not on file    Food insecurity     Worry: Not on file     Inability: Not on file   [employer] needs     Medical: Not on file     Non-medical: Not on file   Tobacco Use    Smoking status: Former Smoker     Packs/day: 1.00     Years: 59.00     Pack years: 59.00     Types: Cigarettes     Start date: 11/30/2017    Smokeless tobacco: Never Used    Tobacco comment: quit 2-3 weeks ago    Substance and Sexual Activity    Alcohol use: Not Currently    Drug use: No    Sexual activity: Yes   Lifestyle    Physical activity     Days per week: Not on file     Minutes per session: Not on file    Stress: Not on file   Relationships    Social connections     Talks on phone: Not on file     Gets together: Not on file     Attends [employer] service: Not on file     Active member of club or organization: Not on file     Attends meetings of clubs or organizations: Not on file     Relationship status: Not on file    Intimate partner violence     Fear of current or ex partner: Not on file     Emotionally abused: Not on file     Physically abused: Not on file     Forced sexual activity: Not on file   Other Topics Concern    Not on file   Social History Narrative    Not on file       Family History:   Family History   Problem Relation Age of Onset    Heart Disease Father         cardiac bypass    Arthritis Father     Arthritis Mother     Other Mother          at age 80    Other Sister         [facility]    No Known Problems Daughter     Stroke Son        Review of Systems:   Unable to obtain due to intubation and sedation      Physical exam:   Constitutional:  VITALS:  BP (!) 147/57   Pulse 70   Temp 97.7 °F (36.5 °C)   Resp 22   Ht 4' 11\" (1.499 m)   Wt 131 lb 2.8 oz (59.5 kg)   LMP  (LMP Unknown)   SpO2 99%   BMI 26.49 kg/m²     General: intubated, sedated  HEENT: normocephalic, atraumatic, ETT in place  Neck: supple, no mass  Lungs: intubated, on vent, coarse breath sounds  Heart: regular rate and rhythm, no murmurs or rubs  Abdomen: soft, non-tender, non-distended  MSK: no joint swelling or tenderness  Ext: no cyanosis, no peripheral edema  Neuro: unable to assess, sedated  Psych: unable to assess due to mental status  Skin: no rash      Data/  CBC:   Lab Results   Component Value Date    WBC 17.7 10/07/2020    RBC 3.89 10/07/2020    HGB 9.8 10/07/2020    HCT 29.9 10/07/2020    MCV 76.8 10/07/2020    MCH 23.4 10/07/2020    MCHC 30.4 10/07/2020    RDW 24.9 10/07/2020     10/07/2020    MPV 8.8 2015     BMP:    Lab Results   Component Value Date     10/07/2020    K 6.3 10/07/2020    K 5.6 10/07/2020    CL 97 10/07/2020    CO2 17 10/07/2020    BUN 40 10/07/2020    LABALBU 4.0 10/05/2020    CREATININE 3.2 10/07/2020    CREATININE 3.42 10/07/2020    CALCIUM 6.2 10/07/2020    GFRAA 17 10/07/2020    LABGLOM 14 10/07/2020    GLUCOSE 625 10/07/2020     Ct Abdomen Pelvis Wo Contrast Additional Contrast? None    Result Date: 10/6/2020  CT ABDOMEN PELVIS WO CONTRAST: 10/5/2020 CLINICAL HISTORY:  recent colonoscopy, fever, vomiting, abd pain .  COMPARISON: 8/24/2020. TECHNIQUE: Spiral images were obtained of the abdomen and pelvis without contrast. All CT scans at this facility use dose modulation, iterative reconstruction, and/or weight based dosing when appropriate to reduce radiation dose to as low as reasonably achievable. FINDINGS: A moderate amount of stool is present within the colon and rectum. There is no abnormal small bowel dilatation, significant inflammation, ascites, lymphadenopathy is, or other significant change from the prior study identified. The unenhanced liver, gallbladder, pancreas, spleen, adrenal glands, kidneys, uterus, adnexa, urinary bladder, and additional images of pelvis appear within normal limits. Moderate atherosclerotic plaquing of a normal caliber abdominal aorta and branch vessels is again noted. Minimal probable atelectasis is noted of the visualized lung bases. Moderately extensive degenerative changes of the thoracolumbar spine with grade 1 anterolisthesis of L4 over L5 is again noted. NO ACUTE INTRA-ABDOMINAL PROCESS OR SIGNIFICANT CHANGE FROM 8/24/2020 IDENTIFIED. Ct Head Wo Contrast    Result Date: 10/7/2020  CT Brain Contrast medium:  Not utilized. History:  Cardiac arrest Comparison:  CT brain, August 25, 2020, August 24, 2020. MRI brain, August 27, 2020. Findings: Extra-axial spaces:  Normal. Intracranial hemorrhage:  None. Ventricular system: Ventricles mildly enlarged. Sulci mildly prominent. Basal Cisterns:  Normal. Cerebral Parenchyma: 4 mm area decreased attenuation exerting no mass effect left thalamus. Midline Shift:  None. Cerebellum:  Normal. Paranasal sinuses and mastoid air cells: Opacification bilateral ethmoid sinuses. Visualized Orbits:  Normal.     Impression: Mild cerebral atrophy. Remote left thalamic lacunar infarct. Ethmoid sinusitis.  All CT scans at this facility use dose modulation, iterative reconstruction, and/or weight based dosing when appropriate to reduce radiation dose to as low as reasonably achievable. Xr Chest Portable    Result Date: 10/7/2020  EXAMINATION: CHEST PORTABLE VIEW  CLINICAL HISTORY: Intubation COMPARISONS: October 5, 2020  FINDINGS: Single  views of the chest is submitted. There is an endotracheal tube. The tip lies at the orifice of the right mainstem bronchus. Repositioning is recommended. The cardiac silhouette is enlarged. Pulmonary vascular unremarkable. Right sided trachea. No focal infiltrates. No Pneumothoraces. THE TIP OF THE ENDOTRACHEAL TUBE LIES AT THE ORIFICE THE RIGHT MAINSTEM BRONCHUS. REPOSITIONING RECOMMENDED    Xr Chest Portable    Result Date: 10/6/2020  EXAMINATION: XR CHEST PORTABLE CLINICAL HISTORY: SHORTNESS OF BREATH COMPARISONS: CT CHEST, AUGUST 25, 2020. CHEST RADIOGRAPHS, AUGUST 25, 2020, AUGUST 24, 2020, JUNE 29, 2020. FINDINGS: Osseous structures are intact. Cardiopericardial silhouette is enlarged and unchanged. Pulmonary vasculature is normal. Lungs are clear. STABLE CARDIOMEGALY. Assessment:  67y.o. year old female with history s/f T2DM, AMY, HTN, HLD, depression, fibromyalgia, chronic back pain, CHF, COPD and CKD stage III who presented for dysuria. Was being rx'd for UTI however pt suffered cardiac arrest (10/07)    1. SAÚL on CKD: etiology unclear for initial worsening as not hypotensive, no contrast or nephrotoxic medications, likely however has contribution from cardiac arrest  2. CKD stage III: most likely in setting of T2DM/HTN, baseline Scr ~1.1-1.2 w/ eGFR mid/high 50s   3. Hyperkalemia 2/2 cardiac arrest, being managed medically   4. Cardiac arrest  5. Bradycardia s/p pacemaker placement  6. Metabolic acidosis 2/2 lactic acidosis and renal failure   7.  Hyperphosphatemia     Plan:  - most likely will have ATN based on long resuscitation time, supportive management  - goal to manage hyperkalemia medically as requiring high doses of pressors w/ risk of worsening hemodynamic stability w/ use of IHD (CRRT will take longer to improve hyperkalemia)   - does however remains low threshold for RRT     Thank you for the consultation. Will continue to follow  Please do not hesitate to call with questions.     Ronnie Vazquez MD

## 2020-10-07 NOTE — PROCEDURES
Femoral central venous catheter    INDICATION:   vascular access    CONSENT:   Unable to be obtained due to the emergent nature of this procedure. PROCEDURE SUMMARY:   The patient was prepped and draped in the usual sterile manner using chlorhexidine scrub. 1% lidocaine was used to numb the region. The finder needle was used to locate the right femoral veinc medial to palpated artery without imaging guidance  . A central venous catheter  was inserted using the Seldinger technique. All ports aspirate and flushed without difficulty. The patient tolerated the procedure well without any immediate complications. The line was sutured into place and the area was cleaned and Tegaderm applied. ESTIMATED BLOOD LOSS:   less than 5 cc    Complications   None        Femoral artery line placement. (A-line)      INDICATION:   Shock need for blood pressure monitoring      CONSENT: Procedure was emergent unable to obtain consent    PROCEDURE SUMMARY:   The patient was prepped and draped in the usual sterile manner using chlorhexidine scrub. 1% lidocaine was used to numb the region. The right femoral artery was accessed using a needle. Pulsatile, arterial blood was visualized and the artery was then threaded using the Seldinger technique and a catheter was then sutured into place. Good wave-form was obtained. The patient tolerated the procedure well without any immediate complications. The area was cleaned and Tegaderm was applied.     ESTIMATED BLOOD LOSS:   5 cc  COMPLICATIONS:  No immediate complication           Tia Mandujano MD, [facility]  10/7/2020 12:39 PM

## 2020-10-07 NOTE — PROGRESS NOTES
Pt glucose it 81 tonight. Perfect serve sent to hospitalist to see if lantus should be held. Pt with poor po currently.   Electronically signed by Lucía Roberto RN on 10/6/2020 at 8:53 PM

## 2020-10-07 NOTE — PROGRESS NOTES
P t arrived to ICU and lost blood pressure and pulseless. 9610 1 amp Epi given and Cpr continues with bagging . Pulse check pulseless at 0841 2nd epi given. 0843 SR 68 b/p 170/79. CPR stopped and ABG's obtained.  Stat xray

## 2020-10-07 NOTE — PROGRESS NOTES
Department of Internal Medicine  General Internal Medicine  Attending Progress Note      SUBJECTIVE:  Pt seen and examined. Rapid response called this AM as patient was unresponsive and hypotensive. HR noted to be in the 20s on tele. Lost pulse and code blue called. Rosc obtained and patient moved to the ICU where she coded again en route (see code note). ROSC obtained in the ICU. Central line placed. Pt now intubated and sedated. Cooling protocol in place.     OBJECTIVE      Medications    Current Facility-Administered Medications: 0.9 % sodium chloride bolus, 500 mL, Intravenous, Once  propofol 1000 MG/100ML injection, , ,   propofol injection, 10 mcg/kg/min, Intravenous, Titrated  fentaNYL (SUBLIMAZE) 1,000 mcg in sodium chloride 0.9 % 100 mL infusion, 25 mcg/hr, Intravenous, Continuous  sodium bicarbonate 150 mEq in dextrose 5 % 1,000 mL infusion, , Intravenous, Continuous  0.9 % sodium chloride infusion, , Intravenous, Once  0.9 % sodium chloride infusion, , Intravenous, Once  promethazine (PHENERGAN) tablet 12.5 mg, 12.5 mg, Oral, Q6H PRN **OR** ondansetron (ZOFRAN) injection 4 mg, 4 mg, Intravenous, Q6H PRN  chlorhexidine (PERIDEX) 0.12 % solution 15 mL, 15 mL, Mouth/Throat, BID  DOPamine (INTROPIN) 400 mg in dextrose 5 % 250 mL infusion, 5 mcg/kg/min, Intravenous, Continuous  EPINEPHrine 1 MG/10ML injection, , ,   EPINEPHrine 1 MG/10ML injection, , ,   EPINEPHrine 1 MG/10ML injection, , ,   sodium bicarbonate 8.4 % injection, , ,   ondansetron (ZOFRAN) injection 4 mg, 4 mg, Intravenous, Q6H PRN  dicyclomine (BENTYL) capsule 10 mg, 10 mg, Oral, 4x Daily PRN  digoxin (LANOXIN) tablet 125 mcg, 125 mcg, Oral, Daily  hydrALAZINE (APRESOLINE) tablet 100 mg, 100 mg, Oral, BID  insulin glargine (LANTUS) injection vial 60 Units, 60 Units, Subcutaneous, Nightly  insulin lispro (HUMALOG) injection vial 20 Units, 20 Units, Subcutaneous, TID WC  pantoprazole (PROTONIX) tablet 40 mg, 40 mg, Oral, QAM AC  PARoxetine (PAXIL) tablet 40 mg, 40 mg, Oral, QAM  rosuvastatin (CRESTOR) tablet 40 mg, 40 mg, Oral, QPM  sodium chloride flush 0.9 % injection 10 mL, 10 mL, Intravenous, 2 times per day  sodium chloride flush 0.9 % injection 10 mL, 10 mL, Intravenous, PRN  acetaminophen (TYLENOL) tablet 650 mg, 650 mg, Oral, Q6H PRN **OR** acetaminophen (TYLENOL) suppository 650 mg, 650 mg, Rectal, Q6H PRN  enoxaparin (LOVENOX) injection 30 mg, 30 mg, Subcutaneous, Daily  insulin lispro (HUMALOG) injection vial 0-6 Units, 0-6 Units, Subcutaneous, TID WC  insulin lispro (HUMALOG) injection vial 0-3 Units, 0-3 Units, Subcutaneous, Nightly  glucose (GLUTOSE) 40 % oral gel 15 g, 15 g, Oral, PRN  dextrose 50 % IV solution, 12.5 g, Intravenous, PRN  glucagon (rDNA) injection 1 mg, 1 mg, Intramuscular, PRN  dextrose 5 % solution, 100 mL/hr, Intravenous, PRN  0.9 % sodium chloride infusion, , Intravenous, Continuous  ciprofloxacin (CIPRO) IVPB 400 mg, 400 mg, Intravenous, Q24H  iopamidol (ISOVUE-300) 61 % injection 100 mL, 100 mL, Intravenous, ONCE PRN  Physical    VITALS:  BP (!) 147/57   Pulse 55   Temp 97.7 °F (36.5 °C)   Resp 18   Ht 4' 11\" (1.499 m)   Wt 131 lb 2.8 oz (59.5 kg)   LMP  (LMP Unknown)   SpO2 100%   BMI 26.49 kg/m²   Constitutional: intubated and sedated  Head: Normocephalic, atraumatic  Eyes: EOMI, PERRLA  ENT: moist mucous membranes, ETT in place  Neck: neck supple, trachea midline  Lungs: coarse breath sounds bilaterally, no wheeze  Heart: bradycardic, normal S1 and S2  GI: Soft, non-distended, +BS  MSK: no edema noted  Skin: warm, dry.  Area of induration around L shoulder where IV infiltrated during code blue     Data    CBC:   Lab Results   Component Value Date    WBC 17.7 10/07/2020    RBC 3.89 10/07/2020    HGB 9.0 10/07/2020    HCT 29.9 10/07/2020    MCV 76.8 10/07/2020    MCH 23.4 10/07/2020    MCHC 30.4 10/07/2020    RDW 24.9 10/07/2020     10/07/2020    MPV 8.8 08/01/2015     CMP:    Lab Results   Component Value Date     10/07/2020    K 5.6 10/07/2020     10/07/2020    CO2 22 10/07/2020    BUN 44 10/07/2020    CREATININE 3.4 10/07/2020    CREATININE 3.45 10/07/2020    GFRAA 16 10/07/2020    LABGLOM 13 10/07/2020    GLUCOSE 231 10/07/2020    PROT 7.5 10/05/2020    LABALBU 4.0 10/05/2020    CALCIUM 8.6 10/07/2020    BILITOT <0.2 10/05/2020    ALKPHOS 134 10/05/2020    AST 37 10/05/2020    ALT 22 10/05/2020       ASSESSMENT AND PLAN      # Acute hypoxic respiratory failure sp cardiac arrest with associated metabolic encephalopathy  - Code blue 10/7. ROSC obtained after about 20 minutes. Coded again and ROSC obtained again about 10 minutes later  - etiology unclear at this time - likely 2/2 acute cardiac event likely exacerbated by sepsis  - pt was on DVT ppx, so PE less likely  - noted to be hyperkalemic this AM. Given insulin and D50  - intubated. Pulm consulted  - cardiology consulted    # Sinus bradycardia  - cardiology   - echocardiogram  - transvenous pacer placed  - hold coreg and verapamil    # Shock, likely cardiogenic  - pressor support  - echo  - IVF  - cont abx. Less likely septic shock as patient was HD stable before code blue    # Sepsis 2/2 UTI with history of ESBL E. Coli  - pt with urinary symptoms for last week and UA positive for UTI  - started on Rocephin. 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 Cr 3.45 today  - likely 2/2 UTI and diuretic.  Holding diuretic and gentle IVF  - caution not to volume overload in setting of CHF  - will monitor renal function and replace electrolytes as needed    #  Acute on chronic diastolic CHF  - exacerbated by cardiac arrest  - appears volume depleted at this time- pressors and IVF per cardiology  - holding diuretic and gently IVF    # HTN, HLD, depression, DM  - ISS and continue home medications    DVT: Lovenox    Disposition: Pt with hx of ESBL UTI presented with UTI on cipro. Code blue 10/7 AM. ROSC obtained. Pt intubated and sedated and moved to the ICU on pressors. Now bradycardic and hypotensive. Pt is critically ill. Echo pending. Pulm and cardio consulted.     Raul Porter,   Internal Medicine

## 2020-10-07 NOTE — PROGRESS NOTES
mannual 82/40. hospitalist notified.   Electronically signed by Jann Arzola RN on 10/7/2020 at 6:58 AM

## 2020-10-07 NOTE — PROGRESS NOTES
Pt very sleepy this am.  Glucose 221  bp is 78/45.   Perfect serve to hospitalist.  Electronically signed by Alicia Biggs RN on 10/7/2020 at 6:39 AM

## 2020-10-08 ENCOUNTER — TELEPHONE (OUTPATIENT)
Dept: GASTROENTEROLOGY | Age: 75
End: 2020-10-08

## 2020-10-08 ENCOUNTER — APPOINTMENT (OUTPATIENT)
Dept: GENERAL RADIOLOGY | Age: 75
DRG: 871 | End: 2020-10-08
Payer: MEDICARE

## 2020-10-08 LAB
ANION GAP SERPL CALCULATED.3IONS-SCNC: 11 MEQ/L (ref 9–15)
ANION GAP SERPL CALCULATED.3IONS-SCNC: 11 MEQ/L (ref 9–15)
ANION GAP SERPL CALCULATED.3IONS-SCNC: 12 MEQ/L (ref 9–15)
ANION GAP SERPL CALCULATED.3IONS-SCNC: 15 MEQ/L (ref 9–15)
BASE EXCESS ARTERIAL: 10 (ref -3–3)
BASE EXCESS ARTERIAL: 10 (ref -3–3)
BASE EXCESS ARTERIAL: 12 (ref -3–3)
BASE EXCESS ARTERIAL: 6 (ref -3–3)
BUN BLDV-MCNC: 36 MG/DL (ref 8–23)
BUN BLDV-MCNC: 37 MG/DL (ref 8–23)
BUN BLDV-MCNC: 39 MG/DL (ref 8–23)
BUN BLDV-MCNC: 39 MG/DL (ref 8–23)
CALCIUM IONIZED: 0.77 MMOL/L (ref 1.12–1.32)
CALCIUM IONIZED: 0.89 MMOL/L (ref 1.12–1.32)
CALCIUM IONIZED: 0.89 MMOL/L (ref 1.12–1.32)
CALCIUM IONIZED: 0.9 MMOL/L (ref 1.12–1.32)
CALCIUM SERPL-MCNC: 6 MG/DL (ref 8.5–9.9)
CALCIUM SERPL-MCNC: 6.3 MG/DL (ref 8.5–9.9)
CALCIUM SERPL-MCNC: 7.3 MG/DL (ref 8.5–9.9)
CALCIUM SERPL-MCNC: 7.4 MG/DL (ref 8.5–9.9)
CHLORIDE BLD-SCNC: 86 MEQ/L (ref 95–107)
CHLORIDE BLD-SCNC: 89 MEQ/L (ref 95–107)
CO2: 31 MEQ/L (ref 20–31)
CO2: 32 MEQ/L (ref 20–31)
CO2: 36 MEQ/L (ref 20–31)
CO2: 37 MEQ/L (ref 20–31)
CREAT SERPL-MCNC: 3.13 MG/DL (ref 0.5–0.9)
CREAT SERPL-MCNC: 3.14 MG/DL (ref 0.5–0.9)
CREAT SERPL-MCNC: 3.17 MG/DL (ref 0.5–0.9)
CREAT SERPL-MCNC: 3.22 MG/DL (ref 0.5–0.9)
GFR AFRICAN AMERICAN: 16
GFR AFRICAN AMERICAN: 16
GFR AFRICAN AMERICAN: 16.9
GFR AFRICAN AMERICAN: 17
GFR AFRICAN AMERICAN: 17.2
GFR AFRICAN AMERICAN: 17.4
GFR AFRICAN AMERICAN: 17.5
GFR AFRICAN AMERICAN: 18
GFR NON-AFRICAN AMERICAN: 13
GFR NON-AFRICAN AMERICAN: 13
GFR NON-AFRICAN AMERICAN: 14
GFR NON-AFRICAN AMERICAN: 14
GFR NON-AFRICAN AMERICAN: 14.2
GFR NON-AFRICAN AMERICAN: 14.4
GFR NON-AFRICAN AMERICAN: 14.4
GFR NON-AFRICAN AMERICAN: 15
GLUCOSE BLD-MCNC: 109 MG/DL (ref 60–115)
GLUCOSE BLD-MCNC: 135 MG/DL (ref 60–115)
GLUCOSE BLD-MCNC: 138 MG/DL (ref 60–115)
GLUCOSE BLD-MCNC: 162 MG/DL (ref 70–99)
GLUCOSE BLD-MCNC: 183 MG/DL (ref 60–115)
GLUCOSE BLD-MCNC: 217 MG/DL (ref 60–115)
GLUCOSE BLD-MCNC: 229 MG/DL (ref 60–115)
GLUCOSE BLD-MCNC: 275 MG/DL (ref 60–115)
GLUCOSE BLD-MCNC: 300 MG/DL (ref 60–115)
GLUCOSE BLD-MCNC: 310 MG/DL (ref 70–99)
GLUCOSE BLD-MCNC: 318 MG/DL (ref 60–115)
GLUCOSE BLD-MCNC: 369 MG/DL (ref 60–115)
GLUCOSE BLD-MCNC: 387 MG/DL (ref 60–115)
GLUCOSE BLD-MCNC: 397 MG/DL (ref 60–115)
GLUCOSE BLD-MCNC: 425 MG/DL (ref 60–115)
GLUCOSE BLD-MCNC: 470 MG/DL (ref 60–115)
GLUCOSE BLD-MCNC: 473 MG/DL (ref 60–115)
GLUCOSE BLD-MCNC: 481 MG/DL (ref 60–115)
GLUCOSE BLD-MCNC: 488 MG/DL (ref 70–99)
GLUCOSE BLD-MCNC: 532 MG/DL (ref 60–115)
GLUCOSE BLD-MCNC: 548 MG/DL (ref 60–115)
GLUCOSE BLD-MCNC: 562 MG/DL (ref 70–99)
GLUCOSE BLD-MCNC: 569 MG/DL (ref 60–115)
GLUCOSE BLD-MCNC: 582 MG/DL (ref 60–115)
GLUCOSE BLD-MCNC: 622 MG/DL (ref 70–99)
GLUCOSE BLD-MCNC: 70 MG/DL (ref 60–115)
GLUCOSE BLD-MCNC: 75 MG/DL (ref 60–115)
GLUCOSE BLD-MCNC: 83 MG/DL (ref 60–115)
GLUCOSE BLD-MCNC: 91 MG/DL (ref 60–115)
GLUCOSE BLD-MCNC: 95 MG/DL (ref 60–115)
GLUCOSE BLD-MCNC: >600 MG/DL (ref 60–115)
HCO3 ARTERIAL: 30.1 MMOL/L (ref 21–29)
HCO3 ARTERIAL: 34.9 MMOL/L (ref 21–29)
HCO3 ARTERIAL: 35.1 MMOL/L (ref 21–29)
HCO3 ARTERIAL: 35.5 MMOL/L (ref 21–29)
HCT VFR BLD CALC: 26.6 % (ref 37–47)
HEMOGLOBIN: 13.2 GM/DL (ref 12–16)
HEMOGLOBIN: 8.3 G/DL (ref 12–16)
HEMOGLOBIN: 9.1 GM/DL (ref 12–16)
HEMOGLOBIN: 9.2 GM/DL (ref 12–16)
HEMOGLOBIN: 9.3 GM/DL (ref 12–16)
LACTATE: 3.1 MMOL/L (ref 0.4–2)
LACTATE: 4.32 MMOL/L (ref 0.4–2)
LACTATE: 5.54 MMOL/L (ref 0.4–2)
LACTATE: 5.74 MMOL/L (ref 0.4–2)
LACTIC ACID: 5.4 MMOL/L (ref 0.5–2.2)
LACTIC ACID: 6.6 MMOL/L (ref 0.5–2.2)
MAGNESIUM: 1.6 MG/DL (ref 1.7–2.4)
MAGNESIUM: 1.7 MG/DL (ref 1.7–2.4)
MAGNESIUM: 2.1 MG/DL (ref 1.7–2.4)
MAGNESIUM: 2.3 MG/DL (ref 1.7–2.4)
MCH RBC QN AUTO: 23.4 PG (ref 27–31.3)
MCHC RBC AUTO-ENTMCNC: 31.4 % (ref 33–37)
MCV RBC AUTO: 74.7 FL (ref 82–100)
O2 SAT, ARTERIAL: 97 % (ref 93–100)
ORGANISM: ABNORMAL
PCO2 ARTERIAL: 44 MM HG (ref 35–45)
PCO2 ARTERIAL: 45 MM HG (ref 35–45)
PCO2 ARTERIAL: 54 MM HG (ref 35–45)
PCO2 ARTERIAL: 57 MM HG (ref 35–45)
PDW BLD-RTO: 24.7 % (ref 11.5–14.5)
PERFORMED ON: ABNORMAL
PERFORMED ON: NORMAL
PH ARTERIAL: 7.4 (ref 7.35–7.45)
PH ARTERIAL: 7.42 (ref 7.35–7.45)
PH ARTERIAL: 7.45 (ref 7.35–7.45)
PH ARTERIAL: 7.51 (ref 7.35–7.45)
PHOSPHORUS: 3.7 MG/DL (ref 2.3–4.8)
PHOSPHORUS: 4.3 MG/DL (ref 2.3–4.8)
PHOSPHORUS: 4.6 MG/DL (ref 2.3–4.8)
PLATELET # BLD: 179 K/UL (ref 130–400)
PO2 ARTERIAL: 80 MM HG (ref 75–108)
PO2 ARTERIAL: 86 MM HG (ref 75–108)
PO2 ARTERIAL: 89 MM HG (ref 75–108)
PO2 ARTERIAL: 93 MM HG (ref 75–108)
POC CHLORIDE: 87 MEQ/L (ref 99–110)
POC CHLORIDE: 87 MEQ/L (ref 99–110)
POC CHLORIDE: 88 MEQ/L (ref 99–110)
POC CHLORIDE: 89 MEQ/L (ref 99–110)
POC CREATININE: 3.1 MG/DL (ref 0.6–1.2)
POC CREATININE: 3.2 MG/DL (ref 0.6–1.2)
POC CREATININE: 3.4 MG/DL (ref 0.6–1.2)
POC CREATININE: 3.4 MG/DL (ref 0.6–1.2)
POC FIO2: 40
POC FIO2: 40
POC FIO2: 45
POC FIO2: 45
POC HEMATOCRIT: 27 % (ref 36–48)
POC HEMATOCRIT: 39 % (ref 36–48)
POC POTASSIUM: 3 MEQ/L (ref 3.5–5.1)
POC POTASSIUM: 3.2 MEQ/L (ref 3.5–5.1)
POC POTASSIUM: 3.4 MEQ/L (ref 3.5–5.1)
POC POTASSIUM: 3.5 MEQ/L (ref 3.5–5.1)
POC SAMPLE TYPE: ABNORMAL
POC SODIUM: 132 MEQ/L (ref 136–145)
POC SODIUM: 132 MEQ/L (ref 136–145)
POC SODIUM: 135 MEQ/L (ref 136–145)
POC SODIUM: 136 MEQ/L (ref 136–145)
POTASSIUM REFLEX MAGNESIUM: 3.5 MEQ/L (ref 3.4–4.9)
POTASSIUM SERPL-SCNC: 2.7 MEQ/L (ref 3.4–4.9)
POTASSIUM SERPL-SCNC: 2.8 MEQ/L (ref 3.4–4.9)
POTASSIUM SERPL-SCNC: 3.1 MEQ/L (ref 3.4–4.9)
POTASSIUM SERPL-SCNC: 3.2 MEQ/L (ref 3.4–4.9)
POTASSIUM SERPL-SCNC: 3.5 MEQ/L (ref 3.4–4.9)
RBC # BLD: 3.56 M/UL (ref 4.2–5.4)
SODIUM BLD-SCNC: 130 MEQ/L (ref 135–144)
SODIUM BLD-SCNC: 132 MEQ/L (ref 135–144)
SODIUM BLD-SCNC: 134 MEQ/L (ref 135–144)
SODIUM BLD-SCNC: 136 MEQ/L (ref 135–144)
TCO2 ARTERIAL: 32 (ref 22–29)
TCO2 ARTERIAL: 37 (ref 22–29)
URINE CULTURE, ROUTINE: ABNORMAL
WBC # BLD: 11.7 K/UL (ref 4.8–10.8)

## 2020-10-08 PROCEDURE — 2500000003 HC RX 250 WO HCPCS: Performed by: INTERNAL MEDICINE

## 2020-10-08 PROCEDURE — 36415 COLL VENOUS BLD VENIPUNCTURE: CPT

## 2020-10-08 PROCEDURE — C9113 INJ PANTOPRAZOLE SODIUM, VIA: HCPCS | Performed by: INTERNAL MEDICINE

## 2020-10-08 PROCEDURE — 99233 SBSQ HOSP IP/OBS HIGH 50: CPT | Performed by: INTERNAL MEDICINE

## 2020-10-08 PROCEDURE — 6360000002 HC RX W HCPCS: Performed by: INTERNAL MEDICINE

## 2020-10-08 PROCEDURE — 85014 HEMATOCRIT: CPT

## 2020-10-08 PROCEDURE — 99291 CRITICAL CARE FIRST HOUR: CPT | Performed by: INTERNAL MEDICINE

## 2020-10-08 PROCEDURE — 2580000003 HC RX 258: Performed by: INTERNAL MEDICINE

## 2020-10-08 PROCEDURE — 6370000000 HC RX 637 (ALT 250 FOR IP): Performed by: INTERNAL MEDICINE

## 2020-10-08 PROCEDURE — 37799 UNLISTED PX VASCULAR SURGERY: CPT

## 2020-10-08 PROCEDURE — 84100 ASSAY OF PHOSPHORUS: CPT

## 2020-10-08 PROCEDURE — 85027 COMPLETE CBC AUTOMATED: CPT

## 2020-10-08 PROCEDURE — 94761 N-INVAS EAR/PLS OXIMETRY MLT: CPT

## 2020-10-08 PROCEDURE — 82948 REAGENT STRIP/BLOOD GLUCOSE: CPT

## 2020-10-08 PROCEDURE — 2580000003 HC RX 258: Performed by: STUDENT IN AN ORGANIZED HEALTH CARE EDUCATION/TRAINING PROGRAM

## 2020-10-08 PROCEDURE — 83605 ASSAY OF LACTIC ACID: CPT

## 2020-10-08 PROCEDURE — 82803 BLOOD GASES ANY COMBINATION: CPT

## 2020-10-08 PROCEDURE — 84132 ASSAY OF SERUM POTASSIUM: CPT

## 2020-10-08 PROCEDURE — 82565 ASSAY OF CREATININE: CPT

## 2020-10-08 PROCEDURE — 83735 ASSAY OF MAGNESIUM: CPT

## 2020-10-08 PROCEDURE — 2700000000 HC OXYGEN THERAPY PER DAY

## 2020-10-08 PROCEDURE — 94003 VENT MGMT INPAT SUBQ DAY: CPT

## 2020-10-08 PROCEDURE — 84295 ASSAY OF SERUM SODIUM: CPT

## 2020-10-08 PROCEDURE — 82947 ASSAY GLUCOSE BLOOD QUANT: CPT

## 2020-10-08 PROCEDURE — 82330 ASSAY OF CALCIUM: CPT

## 2020-10-08 PROCEDURE — 82435 ASSAY OF BLOOD CHLORIDE: CPT

## 2020-10-08 PROCEDURE — 80048 BASIC METABOLIC PNL TOTAL CA: CPT

## 2020-10-08 PROCEDURE — 2000000000 HC ICU R&B

## 2020-10-08 PROCEDURE — 71045 X-RAY EXAM CHEST 1 VIEW: CPT

## 2020-10-08 RX ORDER — POTASSIUM CHLORIDE 29.8 MG/ML
40 INJECTION INTRAVENOUS ONCE
Status: COMPLETED | OUTPATIENT
Start: 2020-10-08 | End: 2020-10-12

## 2020-10-08 RX ORDER — POTASSIUM CHLORIDE 29.8 MG/ML
20 INJECTION INTRAVENOUS ONCE
Status: COMPLETED | OUTPATIENT
Start: 2020-10-08 | End: 2020-10-08

## 2020-10-08 RX ORDER — MAGNESIUM SULFATE IN WATER 40 MG/ML
2 INJECTION, SOLUTION INTRAVENOUS ONCE
Status: COMPLETED | OUTPATIENT
Start: 2020-10-08 | End: 2020-10-08

## 2020-10-08 RX ORDER — SODIUM CHLORIDE 9 MG/ML
INJECTION, SOLUTION INTRAVENOUS CONTINUOUS
Status: DISCONTINUED | OUTPATIENT
Start: 2020-10-08 | End: 2020-10-10

## 2020-10-08 RX ORDER — CALCIUM CHLORIDE 100 MG/ML
1 INJECTION INTRAVENOUS; INTRAVENTRICULAR ONCE
Status: COMPLETED | OUTPATIENT
Start: 2020-10-08 | End: 2020-10-08

## 2020-10-08 RX ORDER — HEPARIN SODIUM 5000 [USP'U]/ML
5000 INJECTION, SOLUTION INTRAVENOUS; SUBCUTANEOUS EVERY 8 HOURS SCHEDULED
Status: DISCONTINUED | OUTPATIENT
Start: 2020-10-08 | End: 2020-10-08

## 2020-10-08 RX ADMIN — PIPERACILLIN AND TAZOBACTAM 2.25 G: 2; .25 INJECTION, POWDER, LYOPHILIZED, FOR SOLUTION INTRAVENOUS at 11:26

## 2020-10-08 RX ADMIN — HYDROCORTISONE SODIUM SUCCINATE 50 MG: 100 INJECTION, POWDER, FOR SOLUTION INTRAMUSCULAR; INTRAVENOUS at 17:14

## 2020-10-08 RX ADMIN — PROPOFOL 10 MCG/KG/MIN: 10 INJECTION, EMULSION INTRAVENOUS at 00:20

## 2020-10-08 RX ADMIN — POTASSIUM CHLORIDE 20 MEQ: 29.8 INJECTION, SOLUTION INTRAVENOUS at 05:45

## 2020-10-08 RX ADMIN — SODIUM CHLORIDE 32.11 UNITS/HR: 9 INJECTION, SOLUTION INTRAVENOUS at 13:41

## 2020-10-08 RX ADMIN — SODIUM CHLORIDE 9.31 UNITS/HR: 9 INJECTION, SOLUTION INTRAVENOUS at 18:22

## 2020-10-08 RX ADMIN — INSULIN HUMAN 20 UNITS: 100 INJECTION, SOLUTION PARENTERAL at 06:07

## 2020-10-08 RX ADMIN — SODIUM BICARBONATE: 84 INJECTION, SOLUTION INTRAVENOUS at 08:05

## 2020-10-08 RX ADMIN — PIPERACILLIN AND TAZOBACTAM 2.25 G: 2; .25 INJECTION, POWDER, LYOPHILIZED, FOR SOLUTION INTRAVENOUS at 20:29

## 2020-10-08 RX ADMIN — SODIUM CHLORIDE 49.6 UNITS/HR: 9 INJECTION, SOLUTION INTRAVENOUS at 06:36

## 2020-10-08 RX ADMIN — Medication 10 ML: at 20:38

## 2020-10-08 RX ADMIN — PROPOFOL 30 MCG/KG/MIN: 10 INJECTION, EMULSION INTRAVENOUS at 11:27

## 2020-10-08 RX ADMIN — INSULIN HUMAN 30 UNITS: 100 INJECTION, SOLUTION PARENTERAL at 04:49

## 2020-10-08 RX ADMIN — CALCIUM CHLORIDE INJECTION 1 G: 100 INJECTION, SOLUTION INTRAVENOUS at 05:31

## 2020-10-08 RX ADMIN — INSULIN HUMAN 30 UNITS: 100 INJECTION, SOLUTION PARENTERAL at 03:37

## 2020-10-08 RX ADMIN — POTASSIUM CHLORIDE 20 MEQ: 29.8 INJECTION, SOLUTION INTRAVENOUS at 06:37

## 2020-10-08 RX ADMIN — VASOPRESSIN 0.04 UNITS/MIN: 20 INJECTION INTRAVENOUS at 04:31

## 2020-10-08 RX ADMIN — Medication 10 ML: at 11:36

## 2020-10-08 RX ADMIN — SODIUM CHLORIDE 47.2 UNITS/HR: 9 INJECTION, SOLUTION INTRAVENOUS at 09:05

## 2020-10-08 RX ADMIN — SODIUM CHLORIDE: 9 INJECTION, SOLUTION INTRAVENOUS at 09:35

## 2020-10-08 RX ADMIN — NOREPINEPHRINE BITARTRATE 19 MCG/MIN: 1 INJECTION INTRAVENOUS at 01:00

## 2020-10-08 RX ADMIN — MAGNESIUM SULFATE IN WATER 2 G: 40 INJECTION, SOLUTION INTRAVENOUS at 06:00

## 2020-10-08 RX ADMIN — PIPERACILLIN AND TAZOBACTAM 2.25 G: 2; .25 INJECTION, POWDER, LYOPHILIZED, FOR SOLUTION INTRAVENOUS at 03:36

## 2020-10-08 RX ADMIN — ROSUVASTATIN CALCIUM 40 MG: 40 TABLET, FILM COATED ORAL at 17:14

## 2020-10-08 RX ADMIN — PANTOPRAZOLE SODIUM 40 MG: 40 INJECTION, POWDER, FOR SOLUTION INTRAVENOUS at 10:40

## 2020-10-08 RX ADMIN — CHLORHEXIDINE GLUCONATE 15 ML: 1.2 RINSE ORAL at 10:36

## 2020-10-08 RX ADMIN — HYDROCORTISONE SODIUM SUCCINATE 50 MG: 100 INJECTION, POWDER, FOR SOLUTION INTRAMUSCULAR; INTRAVENOUS at 05:42

## 2020-10-08 RX ADMIN — SODIUM CHLORIDE 50 UNITS/HR: 9 INJECTION, SOLUTION INTRAVENOUS at 02:33

## 2020-10-08 RX ADMIN — SODIUM CHLORIDE 50 UNITS/HR: 9 INJECTION, SOLUTION INTRAVENOUS at 04:33

## 2020-10-08 RX ADMIN — SODIUM CHLORIDE 43.96 UNITS/HR: 9 INJECTION, SOLUTION INTRAVENOUS at 11:21

## 2020-10-08 RX ADMIN — CHLORHEXIDINE GLUCONATE 15 ML: 1.2 RINSE ORAL at 20:39

## 2020-10-08 RX ADMIN — INSULIN HUMAN 20 UNITS: 100 INJECTION, SOLUTION PARENTERAL at 02:05

## 2020-10-08 RX ADMIN — PROPOFOL 30 MCG/KG/MIN: 10 INJECTION, EMULSION INTRAVENOUS at 17:15

## 2020-10-08 RX ADMIN — SODIUM CHLORIDE 42.8 UNITS/HR: 9 INJECTION, SOLUTION INTRAVENOUS at 00:22

## 2020-10-08 RX ADMIN — SODIUM CHLORIDE: 9 INJECTION, SOLUTION INTRAVENOUS at 17:15

## 2020-10-08 RX ADMIN — SODIUM BICARBONATE: 84 INJECTION, SOLUTION INTRAVENOUS at 02:54

## 2020-10-08 ASSESSMENT — PAIN SCALES - GENERAL
PAINLEVEL_OUTOF10: 0

## 2020-10-08 ASSESSMENT — PULMONARY FUNCTION TESTS
PIF_VALUE: 11
PIF_VALUE: 23
PIF_VALUE: 11
PIF_VALUE: 17
PIF_VALUE: 21
PIF_VALUE: 15
PIF_VALUE: 26
PIF_VALUE: 12
PIF_VALUE: 20
PIF_VALUE: 19
PIF_VALUE: 21
PIF_VALUE: 53
PIF_VALUE: 21
PIF_VALUE: 23
PIF_VALUE: 12
PIF_VALUE: 23
PIF_VALUE: 35
PIF_VALUE: 20
PIF_VALUE: 50
PIF_VALUE: 36
PIF_VALUE: 24
PIF_VALUE: 22
PIF_VALUE: 19
PIF_VALUE: 20
PIF_VALUE: 43
PIF_VALUE: 35
PIF_VALUE: 23
PIF_VALUE: 13
PIF_VALUE: 37
PIF_VALUE: 29
PIF_VALUE: 22
PIF_VALUE: 15
PIF_VALUE: 10
PIF_VALUE: 25
PIF_VALUE: 52
PIF_VALUE: 22
PIF_VALUE: 21
PIF_VALUE: 40
PIF_VALUE: 23
PIF_VALUE: 19
PIF_VALUE: 19
PIF_VALUE: 18
PIF_VALUE: 18

## 2020-10-08 NOTE — PROGRESS NOTES
Comprehensive Nutrition Assessment    Type and Reason for Visit:  Initial(Mechanical ventilation)    Nutrition Recommendations/Plan:   Once thermodynamically stable, start TF. Recommend Semi Elemental formula @ 20 ml/hr x 24 hrs with goal rate of 35 ml/hr. 50 ml water flush QID or as per physician    Nutrition Assessment:  Pt admitted with sepsis, currently on mechanical ventilation for s/p cardiac arrest.  Unable to determine nutrition status pta. Currently on hypothermic protocol. Recommend start Tf once thermodynamically stable. Malnutrition Assessment:  Malnutrition Status:  Insufficient data    Context:  Acute Illness     Findings of the 6 clinical characteristics of malnutrition:  Energy Intake:  Unable to assess  Weight Loss:  No significant weight loss     Body Fat Loss:  No significant body fat loss     Muscle Mass Loss:  No significant muscle mass loss    Fluid Accumulation:  Unable to assess     Strength:  Not Performed    Estimated Daily Nutrient Needs:  Energy (kcal):  5510-6579 (kg x 20-22); Weight Used for Energy Requirements:  Current(60 kg)     Protein (g):  53-66 gm (kg IBW x 1.2-1.5); Weight Used for Protein Requirements:  Ideal(44 kg)        Fluid (ml/day):  6971-5863 (1 ml/kcal); Weight Used for Fluid Requirements:  Current      Nutrition Related Findings:  vent (10/7). s/p cardiac arrest,  arctic sun, labs reviewed gluc 310,  phos 4.6, BUN 37, Cr 3.13, CVC line IVF LR @ 125 ml/hr, propofol @ 10.7 ml/hr (282 kcal), OGT in place,  PMH: COPD, DM 2, CHF      Wounds:  None       Current Nutrition Therapies:    Diet NPO Effective Now    Anthropometric Measures:  · Height: 4' 11\" (149.9 cm)  · Current Body Weight: 131 lb (59.4 kg)(10/6-bedscale)   · Admission Body Weight: 134 lb (60.8 kg)(stated)    · Usual Body Weight: 133 lb (60.3 kg)(2/24/20-bedscale)     · Ideal Body Weight: 95 lbs;  · BMI: 26.4  · BMI Categories: Overweight (BMI 25.0-29. 9)       Nutrition Diagnosis: · Inadequate oral intake related to impaired respiratory function as evidenced by intubation    Nutrition Interventions:   Food and/or Nutrient Delivery:  (Once thermodynamically stable, start TF.  recommend Semi Elemental formula @ 20 ml/hr x 24 hrs with goal rate of 35 ml/hr. 50 ml water flush QID or as per physician)  Nutrition Education/Counseling:  Education not indicated   Coordination of Nutrition Care:  Continued Inpatient Monitoring    Goals:  Ability to start TF if not extubated       Nutrition Monitoring and Evaluation:   Food/Nutrient Intake Outcomes:  Enteral Nutrition Intake/Tolerance  Physical Signs/Symptoms Outcomes:  Biochemical Data, GI Status, Hemodynamic Status, Weight, Other (Comment)(thermodynamic status)     Discharge Planning:     Too soon to determine     Electronically signed by Reina Krishna RD, LD on 10/8/20 at 3:58 PM EDT

## 2020-10-08 NOTE — PROGRESS NOTES
activities of daily living     Non-compliant patient NO showed FU 1/10/17 Dr [address]     Insomnia secondary to chronic pain     Reactive depression     Diabetic radiculopathy (HCC)     Cervical radicular pain     Diabetic asymmetric polyneuropathy (HCC)     Myalgia     Intercostal neuropathy     Osteoarthritis of spine with radiculopathy, lumbar region     Acute combined systolic and diastolic CHF, NYHA class 1 (HCC)     PMB (postmenopausal bleeding)     Acute on chronic diastolic heart failure (HCC)     CHF (congestive heart failure) ([facility])     Hypertensive urgency     Uncontrolled type 2 diabetes mellitus with hyperglycemia (HCC)     Chronic obstructive pulmonary disease with acute exacerbation (HCC)     Acute on chronic diastolic (congestive) heart failure (HCC)     SAÚL (acute kidney injury) ([facility] [address]     Hypoglycemia     HOCM (hypertrophic obstructive cardiomyopathy) ([facility])     Gastrointestinal hemorrhage     COPD exacerbation ([facility])     Anemia     AVM (arteriovenous malformation)     Symptomatic anemia     Flash pulmonary edema (HCC)     Acute on chronic kidney failure (HCC)     Dysarthria     Chronic fatigue     Acute encephalopathy     Adenomatous polyp of sigmoid colon     Adenomatous polyp of transverse colon     Sepsis ([facility])     Major depressive disorder in remission ([facility] [address]     Gastroesophageal reflux disease without esophagitis     Acute cystitis without hematuria     CKD (chronic kidney disease) stage 4, GFR 15-29 ml/min ([facility])     Cardiopulmonary arrest ([address]      Subjective:  Admit Date: 10/5/2020    Interval History: renal function stable, non-oliguric, lactate decreasing, now 5, remains intubated and on pressors     Medications:  Scheduled Meds:   hydrocortisone sodium succinate PF  50 mg Intravenous Q12H    chlorhexidine  15 mL Mouth/Throat BID    insulin lispro  0-12 Units Subcutaneous Q4H    pantoprazole  40 mg Intravenous Daily    And    sodium chloride (PF)  10 mL Intravenous Daily    piperacillin-tazobactam  2.25 g Intravenous Q8H    [Held by provider] digoxin  125 mcg Oral Daily    [Held by provider] PARoxetine  40 mg Oral QAM    rosuvastatin  40 mg Oral QPM    sodium chloride flush  10 mL Intravenous 2 times per day     Continuous Infusions:   sodium chloride      propofol 20 mcg/kg/min (10/08/20 0551)    fentaNYL (SUBLIMAZE) infusion      DOPamine Stopped (10/07/20 1631)    vasopressin (Septic Shock) infusion 0.02 Units/min (10/08/20 0555)    insulin 47.2 Units/hr (10/08/20 0905)    norepinephrine 12 mcg/min (10/08/20 0625)    dextrose         CBC:   Recent Labs     10/07/20  0656  10/08/20  0546 10/08/20  0839   WBC 17.7*  --  11.7*  --    HGB 9.1*   < > 8.3* 9.1*     --  179  --     < > = values in this interval not displayed. CMP:    Recent Labs     10/07/20  2128 10/08/20  0015 10/08/20  0256 10/08/20  0319 10/08/20  0546 10/08/20  0839   *  --  130*  --  132*  --    K 3.5  --  3.2*  --  3.5  --    CL 89*  --  86*  --  86*  --    CO2 27  --  32*  --  31  --    BUN 41*  --  39*  --  39*  --    CREATININE 3.26*  --  3.14* 3.1* 3.22* 3.2*   GLUCOSE 679* 622* 562*  --  488*  --    CALCIUM 6.1*  --  6.0*  --  6.3*  --    LABGLOM 13.8*  --  14.4* 15* 14.0* 14*     Troponin:   Recent Labs     10/05/20  2215   TROPONINI 0.041*     BNP: No results for input(s): BNP in the last 72 hours. INR:   Recent Labs     10/07/20  1343   INR 2.3     Lipids:   Recent Labs     10/05/20  2215   LIPASE 20     Liver:   Recent Labs     10/05/20  2215   AST 37*   ALT 22   ALKPHOS 134*   PROT 7.5   LABALBU 4.0   BILITOT <0.2     Iron:  No results for input(s): IRONS, FERRITIN in the last 72 hours. Invalid input(s): LABIRONS  Urinalysis: No results for input(s): UA in the last 72 hours.     Objective:  Vitals: BP (!) 120/58   Pulse 70   Temp (!) 91.4 °F (33 °C) (Bladder)   Resp (!) 31   Ht 4' 11\" (1.499 m)   Wt 131 lb 2.8 oz (59.5 kg)   LMP  (LMP Unknown)   SpO2 100%   BMI 26.49 kg/m²    Wt Readings from Last 3 Encounters:   10/06/20 131 lb 2.8 oz (59.5 kg)   09/29/20 130 lb (59 kg)   08/24/20 130 lb (59 kg)      24HR INTAKE/OUTPUT:      Intake/Output Summary (Last 24 hours) at 10/8/2020 0930  Last data filed at 10/8/2020 0600  Gross per 24 hour   Intake 8801 ml   Output 2050 ml   Net 6751 ml       General: intubated, sedated  HEENT: normocephalic, atraumatic, ETT in place  Lungs: intubated, on vent, coarse breath sounds  Heart: regular rate and rhythm, no murmurs or rubs  Abdomen: soft, non-tender, non-distended  Ext: no cyanosis, no peripheral edema  Neuro: unable to assess, sedated    Electronically signed by Domenico Weiner MD

## 2020-10-08 NOTE — PROGRESS NOTES
Department of Internal Medicine  General Internal Medicine  Attending Progress Note      SUBJECTIVE:  Pt seen and examined. Cooling protocol in place. Pt unresponsive on pressors. Glucose has been very uncontrolled on insulin drip requiring multiple boluses of insulin in last 24 hours. Multiple drips noted to be in D5 solution. Calcium low today and replaced. Pt to be rewarmed later today.     OBJECTIVE      Medications    Current Facility-Administered Medications: 0.9 % sodium chloride infusion, , Intravenous, Continuous  hydrocortisone sodium succinate PF (SOLU-CORTEF) injection 50 mg, 50 mg, Intravenous, Q12H  propofol injection, 10 mcg/kg/min, Intravenous, Titrated  fentaNYL (SUBLIMAZE) 1,000 mcg in sodium chloride 0.9 % 100 mL infusion, 25 mcg/hr, Intravenous, Continuous  promethazine (PHENERGAN) tablet 12.5 mg, 12.5 mg, Oral, Q6H PRN **OR** ondansetron (ZOFRAN) injection 4 mg, 4 mg, Intravenous, Q6H PRN  chlorhexidine (PERIDEX) 0.12 % solution 15 mL, 15 mL, Mouth/Throat, BID  DOPamine (INTROPIN) 400 mg in dextrose 5 % 250 mL infusion, 5 mcg/kg/min, Intravenous, Continuous  insulin lispro (HUMALOG) injection vial 0-12 Units, 0-12 Units, Subcutaneous, Q4H  pantoprazole (PROTONIX) injection 40 mg, 40 mg, Intravenous, Daily **AND** sodium chloride (PF) 0.9 % injection 10 mL, 10 mL, Intravenous, Daily  piperacillin-tazobactam (ZOSYN) 2.25 g in dextrose 5 % 50 mL IVPB (mini-bag), 2.25 g, Intravenous, Q8H **AND** Pharmacy consult for renal dosing, , , 1 Time  vasopressin 20 Units in dextrose 5 % 100 mL infusion, 0.04 Units/min, Intravenous, Continuous  insulin regular (HUMULIN R;NOVOLIN R) 100 Units in sodium chloride 0.9 % 100 mL infusion, 0.5 Units/hr, Intravenous, Continuous  norepinephrine (LEVOPHED) 16 mg in dextrose 5 % 250 mL infusion, 2 mcg/min, Intravenous, Continuous  ondansetron (ZOFRAN) injection 4 mg, 4 mg, Intravenous, Q6H PRN  dicyclomine (BENTYL) capsule 10 mg, 10 mg, Oral, 4x Daily PRN  [Held by provider] digoxin (LANOXIN) tablet 125 mcg, 125 mcg, Oral, Daily  [Held by provider] PARoxetine (PAXIL) tablet 40 mg, 40 mg, Oral, QAM  rosuvastatin (CRESTOR) tablet 40 mg, 40 mg, Oral, QPM  sodium chloride flush 0.9 % injection 10 mL, 10 mL, Intravenous, 2 times per day  sodium chloride flush 0.9 % injection 10 mL, 10 mL, Intravenous, PRN  acetaminophen (TYLENOL) tablet 650 mg, 650 mg, Oral, Q6H PRN **OR** acetaminophen (TYLENOL) suppository 650 mg, 650 mg, Rectal, Q6H PRN  glucose (GLUTOSE) 40 % oral gel 15 g, 15 g, Oral, PRN  dextrose 50 % IV solution, 12.5 g, Intravenous, PRN  glucagon (rDNA) injection 1 mg, 1 mg, Intramuscular, PRN  dextrose 5 % solution, 100 mL/hr, Intravenous, PRN  iopamidol (ISOVUE-300) 61 % injection 100 mL, 100 mL, Intravenous, ONCE PRN  Physical    VITALS:  BP (!) 120/58   Pulse 70   Temp (!) 91.4 °F (33 °C) (Bladder)   Resp (!) 31   Ht 4' 11\" (1.499 m)   Wt 131 lb 2.8 oz (59.5 kg)   LMP  (LMP Unknown)   SpO2 100%   BMI 26.49 kg/m²   Constitutional: intubated and sedated  Head: Normocephalic, atraumatic  Eyes: EOMI, PERRLA  ENT: moist mucous membranes, ETT in place  Neck: neck supple, trachea midline  Lungs: coarse breath sounds bilaterally, no wheeze  Heart: bradycardic, normal S1 and S2  GI: Soft, non-distended, +BS  MSK: no edema noted  Skin: warm, dry.  Area of induration around L shoulder where IV infiltrated during code blue     Data    CBC:   Lab Results   Component Value Date    WBC 11.7 10/08/2020    RBC 3.56 10/08/2020    HGB 9.1 10/08/2020    HCT 26.6 10/08/2020    MCV 74.7 10/08/2020    MCH 23.4 10/08/2020    MCHC 31.4 10/08/2020    RDW 24.7 10/08/2020     10/08/2020    MPV 8.8 08/01/2015     CMP:    Lab Results   Component Value Date     10/08/2020    K 3.5 10/08/2020    CL 86 10/08/2020    CO2 31 10/08/2020    BUN 39 10/08/2020    CREATININE 3.2 10/08/2020    CREATININE 3.22 10/08/2020    GFRAA 17 10/08/2020    LABGLOM 14 10/08/2020    GLUCOSE 488 10/08/2020    PROT 7.5 10/05/2020    LABALBU 4.0 10/05/2020    CALCIUM 6.3 10/08/2020    BILITOT <0.2 10/05/2020    ALKPHOS 134 10/05/2020    AST 37 10/05/2020    ALT 22 10/05/2020       ASSESSMENT AND PLAN      # Acute hypoxic respiratory failure sp cardiac arrest with associated metabolic encephalopathy  - Code blue 10/7. ROSC obtained after about 20 minutes. Coded again and ROSC obtained again about 10 minutes later  - etiology unclear at this time - likely 2/2 acute cardiac event likely exacerbated by sepsis  - pt was on DVT ppx, so PE less likely  - noted to be hyperkalemic which has since improved  - intubated. Pulm consulted  - cardiology consulted    # Sinus bradycardia  - cardiology   - echocardiogram  - transvenous pacer placed  - hold coreg and verapamil    # Shock, likely cardiogenic  - pressor support  - echo  - IVF  - cont abx. Less likely septic shock as patient was HD stable before code blue    # Sepsis 2/2 UTI with history of ESBL E. Coli  - pt with urinary symptoms for last week and UA positive for UTI  - started on Rocephin. Changed to Cipro after looking at sensitivities of last ESBL UTI in 2019  - UCx with pansensitive E Coli. 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 Cr 3.22 today  - likely 2/2 UTI and diuretic and now exacerbated by cardiac arrest and shock.  Holding diuretic and gentle IVF  - caution not to volume overload in setting of CHF  - will monitor renal function and replace electrolytes as needed  - nephrology consulted    #  Acute on chronic diastolic CHF  - exacerbated by cardiac arrest  - appears volume depleted at this time- pressors and IVF per cardiology  - holding diuretic and gently IVF    # HTN, HLD, depression, DM- hyperglycemia uncontrolled  - on insulin drip and BG uncontrolled  - noted that multiple drips in D5 solution - consider changing to non-dextrose solution if possible - holding BP meds while hypotensive    DVT: Lovenox    Disposition: Pt with hx of ESBL UTI presented with UTI on cipro. Code blue 10/7 AM. ROSC obtained. Pt intubated and sedated and moved to the ICU on pressors. Now bradycardic and hypotensive. Pt is critically ill. Echo pending. Pulm and cardio consulted.     Barron Fernandez, DO  Internal Medicine

## 2020-10-08 NOTE — PLAN OF CARE
Nutrition Problem #1: Inadequate oral intake  Intervention: Food and/or Nutrient Delivery: (Once thermodynamically stable, start TF.  recommend Semi Elemental formula @ 20 ml/hr x 24 hrs with goal rate of 35 ml/hr.  50 ml water flush QID or as per physician)  Nutritional Goals: Ability to start TF if not extubated

## 2020-10-08 NOTE — PROGRESS NOTES
Chief Complaint   Patient presents with    Abdominal Pain     Patient is a 67 y.o. female who presents with a chief complaint of weakness. . Patient is followed on a regular basis by Dr. Michael Moore MD. Apparently patient had been expressing fevers and chills as well as weakness and presented to the emergency department, also complained of dysuria. Patient with history of diastolic congestive heart failure, chronic kidney disease, anemia, history of hypertrophic obstructive cardiomyopathy, gastric AVM. Status post cardiac catheterization in October 2019 that showed normal coronary arteries. Patient was admitted and being treated for urinary tract infection. Apparently she was noted to be hypotensive and bradycardic this morning and CODE BLUE was called. CPR was initiated and patient was given multiple rounds of epinephrine as well as atropine and CPR was initiated for approximate 20 minutes and ROSC was achieved. I was contacted by intensivist to evaluate patient for hypotension as well as bradycardia. Patient was thought to have an acute cardiac event. She was bradycardic in the 30s to 40s with blood pressure in the 70s to 80s on 2 pressors. 10/8/2020[de-identified] Patient is Arctic sun protocol still. She is intubated and sedated. She is currently off of IV pressors and her potassium has been corrected. Transvenous pacemaker in place and set at 45 bpm, currently she is in sinus bradycardia on telemetry heart rate in the 40s to 50s. Creatinine is 3.17. Potassium is 3.1. WBC is down to 11.       Past Medical History        Past Medical History:   Diagnosis Date    Anxiety     Asthma     dx 2019 / has smoked since age 14    CHF (congestive heart failure) (HCC)     Chronic back pain     Bilateral L5 S1 Radic on emg--surprisingly worse on the left than the right--pt's symptoms and her MRI show worse on the right    Chronic obstructive pulmonary disease with acute exacerbation ([facility] [address] 10/12/2019    Depression     Fibromyalgia     Hyperlipidemia     meds > 8 yrs    Hypertension     meds > 45 yrs    On home O2     2l per n/c at bedtime mostly,     Osteoarthritis     Type II diabetes mellitus, uncontrolled (HCC)     hx > 8 yrs    Unspecified sleep apnea          Patient Active Problem List   Diagnosis    Hypertension    Hyperlipidemia    Uncontrolled type 2 diabetes mellitus with complication, without long-term current use of insulin (HCC)    Fibromyalgia    Anxiety    Smoker    Insomnia    DJD (degenerative joint disease), lumbar    Lumbosacral radiculopathy at S1    DDD (degenerative disc disease), lumbar    Dysphonia    CTS (carpal tunnel syndrome)    High risk medication use-Norco - 12/20/17 OARRS PM&R, 02/20/18 OARRS PM&R, 03/07/18 OARRS PM&R, Urine Drug screen negative 02/06/17 PM&R--MED CONTRACT 2/6/17    SOB (shortness of breath) on exertion    Chest pain    Memory deficit    Artificial lens present    Presbyopia    Astigmatism, regular    Cataract, nuclear sclerotic senile    IDDM (insulin dependent diabetes mellitus)    Regular astigmatism    Nuclear senile cataract    Neck pain    Lumbosacral radiculopathy at L5    Spinal stenosis of lumbar region with neurogenic claudication    Impaired mobility and activities of daily living    Non-compliant patient NO showed FU 1/10/17 Dr Alley Freed Insomnia secondary to chronic pain    Reactive depression    Diabetic radiculopathy (HCC)    Cervical radicular pain    Diabetic asymmetric polyneuropathy (HCC)    Myalgia    Intercostal neuropathy    Osteoarthritis of spine with radiculopathy, lumbar region    Acute combined systolic and diastolic CHF, NYHA class 1 (HCC)    PMB (postmenopausal bleeding)    Acute on chronic diastolic heart failure (HCC)    CHF (congestive heart failure) ([address] [address]    Hypertensive urgency    Uncontrolled type 2 diabetes mellitus with hyperglycemia (HCC)    Chronic obstructive pulmonary disease with acute exacerbation (HCC)    Acute on chronic diastolic (congestive) heart failure (HCC)    SAÚL (acute kidney injury) ([facility] [address]    Hypoglycemia    HOCM (hypertrophic obstructive cardiomyopathy) (HCC)    Gastrointestinal hemorrhage    COPD exacerbation (HCC)    Anemia    AVM (arteriovenous malformation)    Symptomatic anemia    Flash pulmonary edema (HCC)    Acute on chronic kidney failure (HCC)    Dysarthria    Chronic fatigue    Acute encephalopathy    Adenomatous polyp of sigmoid colon    Adenomatous polyp of transverse colon    Sepsis (HCC)    Major depressive disorder in remission ([facility] [address]    Gastroesophageal reflux disease without esophagitis    Acute cystitis without hematuria    CKD (chronic kidney disease) stage 4, GFR 15-29 ml/min (HCC)     Past Surgical History         Past Surgical History:   Procedure Laterality Date    BACK SURGERY  2017    lumbar disc    CARDIAC CATHETERIZATION  11/3/14     DR. MIRELES / no stents    COLONOSCOPY  08/29/2016    w/polypectomy     COLONOSCOPY N/A 9/29/2020    COLONOSCOPY WITH POLYPECTOMY performed by Mickie Curiel MD at [facility] N/A 10/7/2019    EUA HYSTEROSCOPY DILATATION AND CURETTAGE performed by Erwin Vaz DO at [facility]. [address], COLON, DIAGNOSTIC      EYE SURGERY      [place] with IOL [address] / [id number]    umbilical hernia repair    NC ESOPHAGOGASTRODUODENOSCOPY TRANSORAL DIAGNOSTIC N/A 3/24/2017    EGD ESOPHAGOGASTRODUODENOSCOPY performed by Margoth Gómez MD at [place] [address] 2/8/2018    negative findings    NC REVISE MEDIAN N/CARPAL TUNNEL SURG Left 6/5/2017    LEFT CARPAL TUNNEL RELEASE performed by Di Mayen MD at [facility] VFF,[id number] N/A 2/8/2018    TONSILLECTOMY      as child    UPPER GASTROINTESTINAL ENDOSCOPY  08/26/2016    w/bx     UPPER GASTROINTESTINAL ENDOSCOPY N/A 2020    EGD possible biopsy performed by Libby Reveles MD at [address] N/A 2020    EGD PUSH ENTEROSCOPY performed by Joseph Lowry MD at [address] History         Socioeconomic History    Marital status:      Spouse name: None    Number of children: None    Years of education: None    Highest education level: None   Occupational History    Occupation: Retired-   Social Needs    Financial resource strain: None    Food insecurity     Worry: None     Inability: None    Transportation needs     Medical: None     Non-medical: None   Tobacco Use    Smoking status: Former Smoker     Packs/day: 1.00     Years: 59.00     Pack years: 59.00     Types: Cigarettes     Start date: 2017    Smokeless tobacco: Never Used    Tobacco comment: quit 2-3 weeks ago    Substance and Sexual Activity    Alcohol use: Not Currently    Drug use: No    Sexual activity: Yes   Lifestyle    Physical activity     Days per week: None     Minutes per session: None    Stress: None   Relationships    Social connections     Talks on phone: None     Gets together: None     Attends Christianity service: None     Active member of club or organization: None     Attends meetings of clubs or organizations: None     Relationship status: None    Intimate partner violence     Fear of current or ex partner: None     Emotionally abused: None     Physically abused: None     Forced sexual activity: None   Other Topics Concern    None   Social History Narrative    None     Family History         Family History   Problem Relation Age of Onset    Heart Disease Father     cardiac bypass    Arthritis Father     Arthritis Mother     Other Mother      at age 80    Other Sister     [facility]     No Known Problems Daughter     Stroke Son      Current Facility-Administered Medications             Current Facility-Administered Medications   Medication Dose Route Frequency Provider Last Rate Last Dose    0.9 % sodium chloride bolus 500 mL Intravenous Once [facility] DO      propofol 1000 MG/100ML injection          propofol injection 10 mcg/kg/min Intravenous Titrated Avril Finley MD      fentaNYL (SUBLIMAZE) 1,000 mcg in sodium chloride 0.9 % 100 mL infusion 25 mcg/hr Intravenous Continuous Manaf MD Shalom      sodium bicarbonate 150 mEq in dextrose 5 % 1,000 mL infusion  Intravenous Continuous Avril Finley  mL/hr at 10/07/20 0950     0.9 % sodium chloride infusion  Intravenous Once Avril Finley MD      0.9 % sodium chloride infusion  Intravenous Once Avril Finley MD      promethazine (PHENERGAN) tablet 12.5 mg 12.5 mg Oral Q6H PRN Avril Finley MD      Or    ondansetron (ZOFRAN) injection 4 mg 4 mg Intravenous Q6H PRN Avril Finley MD      chlorhexidine (PERIDEX) 0.12 % solution 15 mL 15 mL Mouth/Throat BID Avril Finley MD      DOPamine (INTROPIN) 400 mg in dextrose 5 % 250 mL infusion 5 mcg/kg/min Intravenous Continuous Avril Finley MD 11.2 mL/hr at 10/07/20 0951 5 mcg/kg/min at 10/07/20 0951    EPINEPHrine 1 MG/10ML injection          EPINEPHrine 1 MG/10ML injection          EPINEPHrine 1 MG/10ML injection          ondansetron (ZOFRAN) injection 4 mg 4 mg Intravenous U9T PRN Ramsey Costaick, DO  4 mg at 33/84/64 1437    carvedilol (COREG) tablet 50 mg 50 mg Oral BID Ronobir Tatiana, DO  50 mg at 03/74/06 2141    dicyclomine (BENTYL) capsule 10 mg 10 mg Oral 4x Daily PRN [employer]ARON [employer], DO      digoxin (LANOXIN) tablet 125 mcg 125 mcg Oral Daily Ronobir Tatiana, DO  884 mcg at 10/06/20 0829    hydrALAZINE (APRESOLINE) tablet 100 mg 100 mg Oral BID Ronobir Tatiana, DO  480 mg at 76/03/40 2141    insulin glargine (LANTUS) injection vial 60 Units 60 Units Subcutaneous Nightly Ramsey Simpson, DO      insulin lispro (HUMALOG) injection vial 20 Units 20 Units Subcutaneous TID  Ronobir Tatiana, DO  20 Units at 10/06/20 1635    pantoprazole (PROTONIX) tablet 40 mg 40 mg Oral QAM AC Ronobir Tatiana, DO  40 mg at 63/49/12 0629    PARoxetine (PAXIL) tablet 40 mg 40 mg Oral QAM Ronobir Tatiana, DO  40 mg at 59/80/13 0809    rosuvastatin (CRESTOR) tablet 40 mg 40 mg Oral QPM Ronobir Tatiana, DO  40 mg at 57/26/22 1635    verapamil (CALAN SR) extended release tablet 120 mg 120 mg Oral Nightly Ronobir Tatiana, DO  887 mg at 47/03/73 2140    sodium chloride flush 0.9 % injection 10 mL 10 mL Intravenous 2 times per day Brendan Dewey, DO      sodium chloride flush 0.9 % injection 10 mL 10 mL Intravenous PRN Ronobir Tatiana, DO      acetaminophen (TYLENOL) tablet 650 mg 650 mg Oral W4X PRN Ronobir Tatiana, DO      Or    acetaminophen (TYLENOL) suppository 650 mg 650 mg Rectal L8U PRN Ronobir Tatiana, DO      enoxaparin (LOVENOX) injection 30 mg 30 mg Subcutaneous Daily Ronobir Tatiana, DO  30 mg at 42/05/60 0829    insulin lispro (HUMALOG) injection vial 0-6 Units 0-6 Units Subcutaneous TID  Ronobir Tatiana, DO  2 Units at 10/06/20 1214    insulin lispro (HUMALOG) injection vial 0-3 Units 0-3 Units Subcutaneous Nightly Ronobir Tatiana, DO      glucose (GLUTOSE) 40 % oral gel 15 g 15 g Oral PRN Ronobir Tatiana, DO      dextrose 50 % IV solution 12.5 g Intravenous PRN Ronobir Tatiana, DO      glucagon (rDNA) injection 1 mg 1 mg Intramuscular PRN Ronobir Tatiana, DO      dextrose 5 % solution 100 mL/hr Intravenous PRN Ronobir Tatiana, DO      0.9 % sodium chloride infusion  Intravenous Continuous Ronobir Tatiana, DO 75 mL/hr at 10/06/20 1951     ciprofloxacin (CIPRO) IVPB 400 mg 400 mg Intravenous Q24H [facility] DO  Stopped at 10/06/20 1148    iopamidol (ISOVUE-300) 61 % injection 100 mL 100 mL Intravenous ONCE PRN Severo Potters, PA     ALLERGIES: Ibuprofen; Metformin and related; and Darvon [propoxyphene hcl]   Review of Systems   Unable to perform ROS: Intubated     VITALS:   Blood pressure (!) 147/57, pulse 55, temperature 97.7 °F (36.5 °C), resp. rate 18, height 4' 11\" (1.499 m), weight 131 lb 2.8 oz (59.5 kg), SpO2 100 %, not currently breastfeeding. Body mass index is 26.49 kg/m². Physical Exam   HENT:   Head: Normocephalic and atraumatic. Neck: Neck supple. No JVD present. No tracheal deviation present. No thyromegaly present. Cardiovascular: Normal rate, regular rhythm, normal heart sounds and intact distal pulses. PMI is not displaced. Exam reveals no gallop, no S3, no distant heart sounds and no friction rub. No murmur heard. Pulmonary/Chest: No respiratory distress. She has no wheezes. She has no rales. She exhibits no tenderness. Abdominal: Soft. Bowel sounds are normal. She exhibits no distension and no mass. There is no abdominal tenderness. There is no rebound and no guarding. Musculoskeletal:   General: No edema. Neurological: No cranial nerve deficit. Skin: Skin is warm and dry. No rash noted. She is not diaphoretic. No erythema. No pallor.      LABS:   Recent Results         Recent Results (from the past 24 hour(s))   POCT Glucose    Collection Time: 10/06/20 11:02 AM   Result Value Ref Range    POC Glucose 216 (H) 60 - 115 mg/dl    Performed on ACCU-CHEK    POCT Glucose    Collection Time: 10/06/20 4:27 PM   Result Value Ref Range    POC Glucose 133 (H) 60 - 115 mg/dl    Performed on ACCU-CHEK    POCT Glucose    Collection Time: 10/06/20 8:33 PM   Result Value Ref Range    POC Glucose 81 60 - 115 mg/dl    Performed on ACCU-CHEK    POCT Glucose    Collection Time: 10/06/20 9:24 PM   Result Value Ref Range    POC Glucose 114 60 - 115 mg/dl    Performed on ACCU-CHEK    POCT Glucose    Collection Time: 10/07/20 6:33 AM   Result Value Ref Range    POC Glucose 221 (H) 60 - 115 mg/dl    Performed on ACCU-CHEK    CBC    Collection Time: 10/07/20 6:56 AM   Result Value Ref Range    WBC 17.7 (H) 4.8 - 10.8 K/uL    RBC 3.89 (L) 4.20 - 5.40 M/uL    Hemoglobin 9.1 (L) 12.0 - 16.0 g/dL    Hematocrit 29.9 (L) 37.0 - 47.0 %    MCV 76.8 (L) 82.0 - 100.0 fL    MCH 23.4 (L) 27.0 - 31.3 pg    MCHC 30.4 (L) 33.0 - 37.0 %    RDW 24.9 (H) 11.5 - 14.5 %    Platelets 533 844 - 749 K/uL   Basic Metabolic Panel w/ Reflex to MG    Collection Time: 10/07/20 6:56 AM   Result Value Ref Range    Sodium 135 135 - 144 mEq/L    Potassium reflex Magnesium 5.6 (H) 3.4 - 4.9 mEq/L    Chloride 100 95 - 107 mEq/L    CO2 22 20 - 31 mEq/L    Anion Gap 13 9 - 15 mEq/L    Glucose 231 (H) 70 - 99 mg/dL    BUN 44 (H) 8 - 23 mg/dL    CREATININE 3.45 (H) 0.50 - 0.90 mg/dL    GFR Non-African American 12.9 (L) >60    GFR  15.6 (L) >60    Calcium 8.6 8.5 - 9.9 mg/dL   POCT Glucose    Collection Time: 10/07/20 8:05 AM   Result Value Ref Range    POC Glucose 274 (H) 60 - 115 mg/dl    Performed on ACCU-CHEK    POCT Arterial    Collection Time: 10/07/20 9:16 AM   Result Value Ref Range    POC Sodium 139 136 - 145 mEq/L    POC Potassium 5.8 (H) 3.5 - 5.1 mEq/L    POC Chloride 101 99 - 110 mEq/L    POC Glucose 385 (H) 60 - 115 mg/dl    POC Creatinine 3.4 (H) 0.6 - 1.2 mg/dL    GFR Non-African American 13 (A) >60    GFR  16 (A) >60    Calcium, Ion 1.04 (L) 1.12 - 1.32 mmol/L    pH, Arterial 7.246 (L) 7.350 - 7.450    pCO2, Arterial 49 (H) 35 - 45 mm Hg    pO2, Arterial 516 (HH) 75 - 108 mm Hg    HCO3, Arterial 21.3 21.0 - 29.0 mmol/L    Base Excess, Arterial -6 (L) -3 - 3    O2 Sat, Arterial 100 (HH) 93 - 100 %    TCO2, Arterial 23 22 - 29    Lactate 9.14 (HH) 0.40 - 2.00 mmol/L    POC Hematocrit 27 (L) 36 - 48 %    Hemoglobin 9.0 (L) 12.0 - 16.0 gm/dL    FIO2 100.000     Sample Type ART     Performed on SEE BELOW    Troponin:         Lab Results   Component Value Date    TROPONINI 0.041 10/05/2020     EKG: normal sinus rhythm, lvh, LATERAL ST DEPRESSION       ASSESSMENT:   Status post cardiopulmonary arrest questionable secondary to septic shock S  Urosepsis/septic shock   Sinus bradycardia/sick sinus syndrome status post temporary transvenous pacemaker secondary to hyperkalemia versus other  History of hypertrophic obstructive cardiomyopathy   History of moderate mid LAD CAD. Vasospasm noted and improved with nitro. Chronic kidney disease   History of anemia   Normal LV function   Hx of HTN   HLP   Hyperkalemia      PLAN:   1. As always, aggressive risk factor modification is strongly recommended. We should adhere to the JNC VIII guidelines for HTN management and the NCEPATP III guidelines for LDL-C management. 2. Arctic sun protocol  3. CORRECT HYPERKALEMIA  4. Discontinue temporary pacemaker tomorrow if remains independent, heart rate is controlled and potassium is corrected. 5. DC Coreg, digoxin  and Verapamil given hypotension and bradycardia. 6. ICU SUPPORTIVE CARE AND MANAGEMENT  7. Avoid any nephrotoxic agents. 8. GI/DVT PROPH    Thank you for allowing me to participate in the care of your patient, please don't hesitate to contact me if you have any further questions.

## 2020-10-08 NOTE — PROGRESS NOTES
Pulmonary & Critical Care Medicine ICU Progress Note  Chief complaint : Post cardiac arrest    Subjunctive/24 hour events :   Patient seen and examined during multidisciplinary rounds with RN, charge nurse, RT, pharmacy, dietitian, and social service. Patient on vent, currently on hypothermia treatment, sedated, unresponsive, blood sugar is high and currently on insulin drip, she is on Levophed 0.2 mcg kilogram per minute and vasopressin 0.04 units/min, urine output 2000 cc, IV fluid changed today to normal saline, no bowel movement, no vomiting.        Social History     Tobacco Use    Smoking status: Former Smoker     Packs/day: 1.00     Years: 59.00     Pack years: 59.00     Types: Cigarettes     Start date: 2017    Smokeless tobacco: Never Used    Tobacco comment: quit 2-3 weeks ago    Substance Use Topics    Alcohol use: Not Currently         Problem Relation Age of Onset    Heart Disease Father         cardiac bypass    Arthritis Father     Arthritis Mother     Other Mother          at age 80    Other [facility]         [facility]    No Known Problems Daughter     Stroke Son        Recent Labs     10/08/20  0839 10/08/20  1033   PHART 7.506* 7.397   [id number] 45 57*   PO2ART 80* 89*       MV Settings:  Vent Mode: AC/VC Rate Set: (S) 18 bmp/Vt Ordered: (S) 280 mL/ /FiO2 : 40 %           IV:   sodium chloride 125 mL/hr at 10/08/20 0935    propofol 30 mcg/kg/min (10/08/20 0945)    fentaNYL (SUBLIMAZE) infusion      DOPamine Stopped (10/07/20 1631)    vasopressin (Septic Shock) infusion 0.04 Units/min (10/08/20 0920)    insulin 49.44 Units/hr (10/08/20 1012)    norepinephrine 2 mcg/min (10/08/20 1011)    dextrose         Vitals:  BP (!) 120/58   Pulse 70   Temp (!) 91.4 °F (33 °C) (Bladder)   Resp (!) 31   Ht 4' 11\" (1.499 m)   Wt 131 lb 2.8 oz (59.5 kg)   LMP  (LMP Unknown)   SpO2 100%   BMI 26.49 kg/m²    Tmax:        Intake/Output Summary (Last 24 hours) at 10/8/2020 --    CO2 27 32*  --  31  --  37*  --    PHOS 3.8 3.7  --   --   --  4.6  --    BUN 41* 39*  --  39*  --  37*  --    CREATININE 3.26* 3.14*   < > 3.22* 3.2* 3.13* 3.4*    < > = values in this interval not displayed. LIVER PROFILE:   Recent Labs     10/05/20  2215   AST 37*   ALT 22   LIPASE 20   BILITOT <0.2   ALKPHOS 134*     PT/INR:   Recent Labs     10/05/20  2215 10/07/20  1343   PROTIME 13.1 24.8*   INR 1.0 2.3     APTT:   Recent Labs     10/05/20  2215 10/07/20  1343   APTT 23.3* 53.1*     UA:  Recent Labs     10/05/20  2215   COLORU Yellow   PHUR 6.5   WBCUA >100*   RBCUA 20-50*   BACTERIA MANY*   CLARITYU CLOUDY*   SPECGRAV 1.015   LEUKOCYTESUR LARGE*   UROBILINOGEN 0.2   BILIRUBINUR Negative   BLOODU LARGE*   GLUCOSEU >=1000*       Cultures:  E. coli in the urine  Films:  CXR reviewed by me and it showed congestion, ET tube andOG tube in good position      Assessment:   This is a critically ill patient at risk of deterioration / death , needing close ICU monitoring and intervention due to below noted problems     · Post cardiac arrest, probably due to hyperkalemia  · Acute on chronic hypoxic and hypercapnic respiratory failure secondary to #1  · Septic shock with multiorgan failure secondary to  · E. coli UTI  · Severe bradycardia, secondary to shock currently improved  · Acute kidney injury, good urine output  · Hyperglycemia  Recommendation  · Vent support lung protective strategy  · head of the bed 30°  · Daily sedation holidays and breathing trials  · Sedation with combination propofol and fentanyl target R ASS of 0 to -1  · Watch for ICU delirium: TV on, natural light, avoid benzos, pain control, early mobility, and family engagement  · PUD prophylaxis  · DVT prophylaxis  · Continue broad-spectrum antibiotic  · Hypothermia protocol, rewarming later today  · Stop sedation after rewarming and evaluate  · Continue insulin drip, target blood sugar 1 40-1 80  · Continue pressors target mean arterial pressure 65-70  · Watch renal function and urine output  · Decrease Solu-Cortef to twice daily  · Prognosis is guarded  ·     Due to the immediate potential for life-threatening deterioration due to cardiac arrest I spent 45  minutes providing critical care.  This time is excluding time spent performing procedures.           Electronically signed by Donovan Wallace MD,  [facility] ,on 10/8/2020 at 10:51 AM

## 2020-10-08 NOTE — FLOWSHEET NOTE
Pt arrived to ICU after Code Blue in AM. Arrived with RN on bed, doing compressions. Dr. Gabriela Conner in room. Orders received Code continued. Please see code charting for details. Dr. Shaye Sherman consulted shortly after -for HR sustaining 35s. Pt taken to cathlab and temperary pacemaker placed. Pt returned to ICU with sheath in place; RN informed sheath is to be removed once pt is stabilized in ICU. Discussed with Intensivist -ok to pull once pt is stable. Pt intubated and alert. No sedation. Responding to commands as evidenced by opening eyes in response to name, tracking and recognizing , wiggling of toes and grasping fingers on command. Same assessment noted by 2nd RN, Arliss Hodgkins. Dr. Emilee Caldera notified and re-evaluated pt. Orders received. Artic sun pads placed, hypothermia therapy initiated. RN remained in room, addressing orders. Unable to verify belongings in closet d/t prioritizing and close monitoring of unstable, critically ill pt.

## 2020-10-08 NOTE — PROGRESS NOTES
2220- bs 679, insulin drip adjusted per protocol. md aware of bs trending up    0030- og tube inserted without difficulty. Tolerated well. [email or web address]. Low-intermittent suction applied    0100- bs 622, insulin drip adjusted per protocol. Iv pump max 50units/hr. md aware. Gave new orders. See mar    0216- bs 582, insulin drip adjusted per protocol new dose 52.2units/hr. iv pump max 50units/hr. [email or web address] message left for md    80- md gave new orders. See mar. Right femoral venous sheath removed without incident, pressure applied x20min, pulse doppled in right foot. Dressing applied. No bleeding or hematoma noted. Tolerated well.    0400- bs 569, K and Ca reported to md via [email or web address]. Insulin drip continues at 50units/hr    0415- new orders received. See mar. Pupils not equal, see flowsheet.  md aware, propofol being titrated down

## 2020-10-09 ENCOUNTER — APPOINTMENT (OUTPATIENT)
Dept: INTERVENTIONAL RADIOLOGY/VASCULAR | Age: 75
DRG: 871 | End: 2020-10-09
Payer: MEDICARE

## 2020-10-09 ENCOUNTER — APPOINTMENT (OUTPATIENT)
Dept: CT IMAGING | Age: 75
DRG: 871 | End: 2020-10-09
Payer: MEDICARE

## 2020-10-09 ENCOUNTER — APPOINTMENT (OUTPATIENT)
Dept: GENERAL RADIOLOGY | Age: 75
DRG: 871 | End: 2020-10-09
Payer: MEDICARE

## 2020-10-09 LAB
ANION GAP SERPL CALCULATED.3IONS-SCNC: 13 MEQ/L (ref 9–15)
ANION GAP SERPL CALCULATED.3IONS-SCNC: 16 MEQ/L (ref 9–15)
ANION GAP SERPL CALCULATED.3IONS-SCNC: 18 MEQ/L (ref 9–15)
ANION GAP SERPL CALCULATED.3IONS-SCNC: 8 MEQ/L (ref 9–15)
BASE EXCESS ARTERIAL: 11 (ref -3–3)
BASE EXCESS ARTERIAL: 8 (ref -3–3)
BUN BLDV-MCNC: 33 MG/DL (ref 8–23)
BUN BLDV-MCNC: 34 MG/DL (ref 8–23)
BUN BLDV-MCNC: 35 MG/DL (ref 8–23)
BUN BLDV-MCNC: 35 MG/DL (ref 8–23)
CALCIUM IONIZED, CALC AT PH 7.4: 0.86 MMOL/L (ref 1.11–1.3)
CALCIUM IONIZED, CALC AT PH 7.4: 0.98 MMOL/L (ref 1.11–1.3)
CALCIUM IONIZED, CALC AT PH 7.4: 0.98 MMOL/L (ref 1.11–1.3)
CALCIUM IONIZED: 0.78 MMOL/L (ref 1.12–1.32)
CALCIUM IONIZED: 0.81 MMOL/L (ref 1.12–1.32)
CALCIUM IONIZED: 0.88 MMOL/L (ref 1.11–1.3)
CALCIUM IONIZED: 0.97 MMOL/L (ref 1.11–1.3)
CALCIUM IONIZED: 1 MMOL/L (ref 1.11–1.3)
CALCIUM SERPL-MCNC: 6.2 MG/DL (ref 8.5–9.9)
CALCIUM SERPL-MCNC: 6.6 MG/DL (ref 8.5–9.9)
CHLORIDE BLD-SCNC: 87 MEQ/L (ref 95–107)
CHLORIDE BLD-SCNC: 89 MEQ/L (ref 95–107)
CHLORIDE BLD-SCNC: 91 MEQ/L (ref 95–107)
CHLORIDE BLD-SCNC: 92 MEQ/L (ref 95–107)
CO2: 28 MEQ/L (ref 20–31)
CO2: 31 MEQ/L (ref 20–31)
CO2: 31 MEQ/L (ref 20–31)
CO2: 36 MEQ/L (ref 20–31)
CREAT SERPL-MCNC: 2.88 MG/DL (ref 0.5–0.9)
CREAT SERPL-MCNC: 3.03 MG/DL (ref 0.5–0.9)
CREAT SERPL-MCNC: 3.22 MG/DL (ref 0.5–0.9)
CREAT SERPL-MCNC: 3.26 MG/DL (ref 0.5–0.9)
GFR AFRICAN AMERICAN: 16
GFR AFRICAN AMERICAN: 16
GFR AFRICAN AMERICAN: 16.7
GFR AFRICAN AMERICAN: 16.9
GFR AFRICAN AMERICAN: 18.1
GFR AFRICAN AMERICAN: 19.2
GFR NON-AFRICAN AMERICAN: 13
GFR NON-AFRICAN AMERICAN: 13
GFR NON-AFRICAN AMERICAN: 13.8
GFR NON-AFRICAN AMERICAN: 14
GFR NON-AFRICAN AMERICAN: 15
GFR NON-AFRICAN AMERICAN: 15.9
GLUCOSE BLD-MCNC: 122 MG/DL (ref 60–115)
GLUCOSE BLD-MCNC: 127 MG/DL (ref 60–115)
GLUCOSE BLD-MCNC: 128 MG/DL (ref 60–115)
GLUCOSE BLD-MCNC: 141 MG/DL (ref 60–115)
GLUCOSE BLD-MCNC: 141 MG/DL (ref 70–99)
GLUCOSE BLD-MCNC: 147 MG/DL (ref 60–115)
GLUCOSE BLD-MCNC: 158 MG/DL (ref 60–115)
GLUCOSE BLD-MCNC: 163 MG/DL (ref 60–115)
GLUCOSE BLD-MCNC: 193 MG/DL (ref 70–99)
GLUCOSE BLD-MCNC: 210 MG/DL (ref 60–115)
GLUCOSE BLD-MCNC: 215 MG/DL (ref 60–115)
GLUCOSE BLD-MCNC: 249 MG/DL (ref 70–99)
GLUCOSE BLD-MCNC: 252 MG/DL (ref 60–115)
GLUCOSE BLD-MCNC: 265 MG/DL (ref 60–115)
GLUCOSE BLD-MCNC: 64 MG/DL (ref 70–99)
GLUCOSE BLD-MCNC: 66 MG/DL (ref 60–115)
GLUCOSE BLD-MCNC: 74 MG/DL (ref 60–115)
GLUCOSE BLD-MCNC: 74 MG/DL (ref 60–115)
HCO3 ARTERIAL: 30.8 MMOL/L (ref 21–29)
HCO3 ARTERIAL: 34 MMOL/L (ref 21–29)
HCT VFR BLD CALC: 27.3 % (ref 37–47)
HEMOGLOBIN: 8 GM/DL (ref 12–16)
HEMOGLOBIN: 8.8 G/DL (ref 12–16)
HEMOGLOBIN: 9.8 GM/DL (ref 12–16)
LACTATE: 2.78 MMOL/L (ref 0.4–2)
LACTATE: 3.73 MMOL/L (ref 0.4–2)
MAGNESIUM: 1.7 MG/DL (ref 1.7–2.4)
MAGNESIUM: 1.8 MG/DL (ref 1.7–2.4)
MAGNESIUM: 1.8 MG/DL (ref 1.7–2.4)
MAGNESIUM: 2 MG/DL (ref 1.7–2.4)
MCH RBC QN AUTO: 23.7 PG (ref 27–31.3)
MCHC RBC AUTO-ENTMCNC: 32.3 % (ref 33–37)
MCV RBC AUTO: 73.4 FL (ref 82–100)
O2 SAT, ARTERIAL: 98 % (ref 93–100)
O2 SAT, ARTERIAL: 99 % (ref 93–100)
PCO2 ARTERIAL: 40 MM HG (ref 35–45)
PCO2 ARTERIAL: 42 MM HG (ref 35–45)
PDW BLD-RTO: 23.8 % (ref 11.5–14.5)
PERFORMED ON: ABNORMAL
PERFORMED ON: NORMAL
PH ARTERIAL: 7.5 (ref 7.35–7.45)
PH ARTERIAL: 7.52 (ref 7.35–7.45)
PLATELET # BLD: 199 K/UL (ref 130–400)
PO2 ARTERIAL: 108 MM HG (ref 75–108)
PO2 ARTERIAL: 90 MM HG (ref 75–108)
POC CHLORIDE: 92 MEQ/L (ref 99–110)
POC CHLORIDE: 94 MEQ/L (ref 99–110)
POC CREATININE: 3.4 MG/DL (ref 0.6–1.2)
POC CREATININE: 3.4 MG/DL (ref 0.6–1.2)
POC FIO2: 35
POC FIO2: 40
POC HEMATOCRIT: 23 % (ref 36–48)
POC HEMATOCRIT: 29 % (ref 36–48)
POC POTASSIUM: 2.9 MEQ/L (ref 3.5–5.1)
POC POTASSIUM: 3.7 MEQ/L (ref 3.5–5.1)
POC SAMPLE TYPE: ABNORMAL
POC SAMPLE TYPE: ABNORMAL
POC SODIUM: 135 MEQ/L (ref 136–145)
POC SODIUM: 135 MEQ/L (ref 136–145)
POTASSIUM REFLEX MAGNESIUM: 3.1 MEQ/L (ref 3.4–4.9)
POTASSIUM REFLEX MAGNESIUM: 3.3 MEQ/L (ref 3.4–4.9)
POTASSIUM REFLEX MAGNESIUM: 3.5 MEQ/L (ref 3.4–4.9)
POTASSIUM SERPL-SCNC: 2.9 MEQ/L (ref 3.4–4.9)
POTASSIUM SERPL-SCNC: 3 MEQ/L (ref 3.4–4.9)
POTASSIUM SERPL-SCNC: 3.1 MEQ/L (ref 3.4–4.9)
POTASSIUM SERPL-SCNC: 3.1 MEQ/L (ref 3.4–4.9)
POTASSIUM SERPL-SCNC: 3.2 MEQ/L (ref 3.4–4.9)
POTASSIUM SERPL-SCNC: ABNORMAL MEQ/L (ref 3.4–4.9)
RBC # BLD: 3.73 M/UL (ref 4.2–5.4)
SODIUM BLD-SCNC: 131 MEQ/L (ref 135–144)
SODIUM BLD-SCNC: 136 MEQ/L (ref 135–144)
SODIUM BLD-SCNC: 136 MEQ/L (ref 135–144)
SODIUM BLD-SCNC: 137 MEQ/L (ref 135–144)
TCO2 ARTERIAL: 32 (ref 22–29)
TCO2 ARTERIAL: 35 (ref 22–29)
WBC # BLD: 12.9 K/UL (ref 4.8–10.8)

## 2020-10-09 PROCEDURE — 6360000002 HC RX W HCPCS: Performed by: INTERNAL MEDICINE

## 2020-10-09 PROCEDURE — 2700000000 HC OXYGEN THERAPY PER DAY

## 2020-10-09 PROCEDURE — 85014 HEMATOCRIT: CPT

## 2020-10-09 PROCEDURE — 99291 CRITICAL CARE FIRST HOUR: CPT | Performed by: INTERNAL MEDICINE

## 2020-10-09 PROCEDURE — 82948 REAGENT STRIP/BLOOD GLUCOSE: CPT

## 2020-10-09 PROCEDURE — 70450 CT HEAD/BRAIN W/O DYE: CPT

## 2020-10-09 PROCEDURE — 2580000003 HC RX 258: Performed by: INTERNAL MEDICINE

## 2020-10-09 PROCEDURE — 82803 BLOOD GASES ANY COMBINATION: CPT

## 2020-10-09 PROCEDURE — 02HV33Z INSERTION OF INFUSION DEVICE INTO SUPERIOR VENA CAVA, PERCUTANEOUS APPROACH: ICD-10-PCS | Performed by: INTERNAL MEDICINE

## 2020-10-09 PROCEDURE — 2000000000 HC ICU R&B

## 2020-10-09 PROCEDURE — 83735 ASSAY OF MAGNESIUM: CPT

## 2020-10-09 PROCEDURE — 85027 COMPLETE CBC AUTOMATED: CPT

## 2020-10-09 PROCEDURE — 2709999900 IR PICC WO SQ PORT/PUMP > 5 YEARS

## 2020-10-09 PROCEDURE — 80048 BASIC METABOLIC PNL TOTAL CA: CPT

## 2020-10-09 PROCEDURE — 37799 UNLISTED PX VASCULAR SURGERY: CPT

## 2020-10-09 PROCEDURE — 82330 ASSAY OF CALCIUM: CPT

## 2020-10-09 PROCEDURE — 2580000003 HC RX 258: Performed by: STUDENT IN AN ORGANIZED HEALTH CARE EDUCATION/TRAINING PROGRAM

## 2020-10-09 PROCEDURE — 84132 ASSAY OF SERUM POTASSIUM: CPT

## 2020-10-09 PROCEDURE — C9113 INJ PANTOPRAZOLE SODIUM, VIA: HCPCS | Performed by: INTERNAL MEDICINE

## 2020-10-09 PROCEDURE — 82565 ASSAY OF CREATININE: CPT

## 2020-10-09 PROCEDURE — 6370000000 HC RX 637 (ALT 250 FOR IP): Performed by: INTERNAL MEDICINE

## 2020-10-09 PROCEDURE — 84295 ASSAY OF SERUM SODIUM: CPT

## 2020-10-09 PROCEDURE — 99233 SBSQ HOSP IP/OBS HIGH 50: CPT | Performed by: INTERNAL MEDICINE

## 2020-10-09 PROCEDURE — 71045 X-RAY EXAM CHEST 1 VIEW: CPT

## 2020-10-09 PROCEDURE — 83605 ASSAY OF LACTIC ACID: CPT

## 2020-10-09 PROCEDURE — 36415 COLL VENOUS BLD VENIPUNCTURE: CPT

## 2020-10-09 PROCEDURE — 94003 VENT MGMT INPAT SUBQ DAY: CPT

## 2020-10-09 PROCEDURE — 2500000003 HC RX 250 WO HCPCS: Performed by: INTERNAL MEDICINE

## 2020-10-09 PROCEDURE — 36573 INSJ PICC RS&I 5 YR+: CPT

## 2020-10-09 PROCEDURE — 82435 ASSAY OF BLOOD CHLORIDE: CPT

## 2020-10-09 RX ORDER — PROPOFOL 10 MG/ML
10 INJECTION, EMULSION INTRAVENOUS
Status: DISCONTINUED | OUTPATIENT
Start: 2020-10-09 | End: 2020-10-09

## 2020-10-09 RX ORDER — SODIUM CHLORIDE 0.9 % (FLUSH) 0.9 %
10 SYRINGE (ML) INJECTION PRN
Status: DISCONTINUED | OUTPATIENT
Start: 2020-10-09 | End: 2020-10-20 | Stop reason: HOSPADM

## 2020-10-09 RX ORDER — SODIUM CHLORIDE 0.9 % (FLUSH) 0.9 %
10 SYRINGE (ML) INJECTION EVERY 12 HOURS SCHEDULED
Status: DISCONTINUED | OUTPATIENT
Start: 2020-10-09 | End: 2020-10-20 | Stop reason: HOSPADM

## 2020-10-09 RX ORDER — LIDOCAINE HYDROCHLORIDE 20 MG/ML
5 INJECTION, SOLUTION INFILTRATION; PERINEURAL ONCE
Status: COMPLETED | OUTPATIENT
Start: 2020-10-09 | End: 2020-10-09

## 2020-10-09 RX ORDER — SODIUM CHLORIDE 9 MG/ML
250 INJECTION, SOLUTION INTRAVENOUS ONCE
Status: COMPLETED | OUTPATIENT
Start: 2020-10-09 | End: 2020-10-09

## 2020-10-09 RX ORDER — HEPARIN SODIUM 5000 [USP'U]/ML
5000 INJECTION, SOLUTION INTRAVENOUS; SUBCUTANEOUS EVERY 8 HOURS SCHEDULED
Status: DISCONTINUED | OUTPATIENT
Start: 2020-10-09 | End: 2020-10-13

## 2020-10-09 RX ORDER — POTASSIUM CHLORIDE 29.8 MG/ML
20 INJECTION INTRAVENOUS PRN
Status: DISCONTINUED | OUTPATIENT
Start: 2020-10-09 | End: 2020-10-20 | Stop reason: HOSPADM

## 2020-10-09 RX ADMIN — PIPERACILLIN AND TAZOBACTAM 2.25 G: 2; .25 INJECTION, POWDER, LYOPHILIZED, FOR SOLUTION INTRAVENOUS at 04:03

## 2020-10-09 RX ADMIN — LIDOCAINE HYDROCHLORIDE 5 ML: 20 INJECTION, SOLUTION INFILTRATION; PERINEURAL at 16:04

## 2020-10-09 RX ADMIN — SODIUM CHLORIDE: 9 INJECTION, SOLUTION INTRAVENOUS at 16:52

## 2020-10-09 RX ADMIN — POTASSIUM CHLORIDE 20 MEQ: 29.8 INJECTION, SOLUTION INTRAVENOUS at 13:17

## 2020-10-09 RX ADMIN — PANTOPRAZOLE SODIUM 40 MG: 40 INJECTION, POWDER, FOR SOLUTION INTRAVENOUS at 08:00

## 2020-10-09 RX ADMIN — INSULIN LISPRO 2 UNITS: 100 INJECTION, SOLUTION INTRAVENOUS; SUBCUTANEOUS at 11:44

## 2020-10-09 RX ADMIN — PIPERACILLIN AND TAZOBACTAM 2.25 G: 2; .25 INJECTION, POWDER, LYOPHILIZED, FOR SOLUTION INTRAVENOUS at 20:24

## 2020-10-09 RX ADMIN — ROSUVASTATIN CALCIUM 40 MG: 40 TABLET, FILM COATED ORAL at 21:29

## 2020-10-09 RX ADMIN — HEPARIN SODIUM 5000 UNITS: 5000 INJECTION INTRAVENOUS; SUBCUTANEOUS at 21:13

## 2020-10-09 RX ADMIN — Medication 10 ML: at 08:01

## 2020-10-09 RX ADMIN — PROPOFOL 30 MCG/KG/MIN: 10 INJECTION, EMULSION INTRAVENOUS at 00:00

## 2020-10-09 RX ADMIN — POTASSIUM CHLORIDE 20 MEQ: 29.8 INJECTION, SOLUTION INTRAVENOUS at 14:12

## 2020-10-09 RX ADMIN — PROPOFOL 45 MCG/KG/MIN: 10 INJECTION, EMULSION INTRAVENOUS at 05:45

## 2020-10-09 RX ADMIN — DEXTROSE MONOHYDRATE 12.5 G: 25 INJECTION, SOLUTION INTRAVENOUS at 02:06

## 2020-10-09 RX ADMIN — NOREPINEPHRINE BITARTRATE 4 MCG/MIN: 1 INJECTION INTRAVENOUS at 14:13

## 2020-10-09 RX ADMIN — VASOPRESSIN 0.04 UNITS/MIN: 20 INJECTION INTRAVENOUS at 16:52

## 2020-10-09 RX ADMIN — INSULIN LISPRO 3 UNITS: 100 INJECTION, SOLUTION INTRAVENOUS; SUBCUTANEOUS at 17:16

## 2020-10-09 RX ADMIN — SODIUM CHLORIDE: 9 INJECTION, SOLUTION INTRAVENOUS at 09:05

## 2020-10-09 RX ADMIN — HYDROCORTISONE SODIUM SUCCINATE 100 MG: 100 INJECTION, POWDER, FOR SOLUTION INTRAMUSCULAR; INTRAVENOUS at 11:45

## 2020-10-09 RX ADMIN — VASOPRESSIN 0.04 UNITS/MIN: 20 INJECTION INTRAVENOUS at 09:04

## 2020-10-09 RX ADMIN — SODIUM CHLORIDE 250 ML: 9 INJECTION, SOLUTION INTRAVENOUS at 16:06

## 2020-10-09 RX ADMIN — PIPERACILLIN AND TAZOBACTAM 2.25 G: 2; .25 INJECTION, POWDER, LYOPHILIZED, FOR SOLUTION INTRAVENOUS at 11:44

## 2020-10-09 RX ADMIN — HYDROCORTISONE SODIUM SUCCINATE 50 MG: 100 INJECTION, POWDER, FOR SOLUTION INTRAMUSCULAR; INTRAVENOUS at 06:21

## 2020-10-09 RX ADMIN — CHLORHEXIDINE GLUCONATE 15 ML: 1.2 RINSE ORAL at 08:00

## 2020-10-09 RX ADMIN — Medication 10 ML: at 20:24

## 2020-10-09 RX ADMIN — SODIUM CHLORIDE: 9 INJECTION, SOLUTION INTRAVENOUS at 02:00

## 2020-10-09 RX ADMIN — Medication 10 ML: at 21:29

## 2020-10-09 RX ADMIN — HYDROCORTISONE SODIUM SUCCINATE 100 MG: 100 INJECTION, POWDER, FOR SOLUTION INTRAMUSCULAR; INTRAVENOUS at 21:13

## 2020-10-09 ASSESSMENT — PULMONARY FUNCTION TESTS
PIF_VALUE: 19
PIF_VALUE: 21
PIF_VALUE: 19
PIF_VALUE: 21
PIF_VALUE: 11
PIF_VALUE: 16
PIF_VALUE: 16
PIF_VALUE: 17
PIF_VALUE: 19
PIF_VALUE: 11
PIF_VALUE: 14
PIF_VALUE: 20
PIF_VALUE: 20
PIF_VALUE: 11
PIF_VALUE: 28
PIF_VALUE: 11
PIF_VALUE: 17
PIF_VALUE: 8.8
PIF_VALUE: 21
PIF_VALUE: 21
PIF_VALUE: 12
PIF_VALUE: 19
PIF_VALUE: 20
PIF_VALUE: 14
PIF_VALUE: 16
PIF_VALUE: 21
PIF_VALUE: 14
PIF_VALUE: 20
PIF_VALUE: 12
PIF_VALUE: 22
PIF_VALUE: 18
PIF_VALUE: 17
PIF_VALUE: 19
PIF_VALUE: 20
PIF_VALUE: 20
PIF_VALUE: 19
PIF_VALUE: 14
PIF_VALUE: 14
PIF_VALUE: 20
PIF_VALUE: 17
PIF_VALUE: 14
PIF_VALUE: 18

## 2020-10-09 ASSESSMENT — PAIN SCALES - GENERAL
PAINLEVEL_OUTOF10: 0

## 2020-10-09 NOTE — PROGRESS NOTES
Pulmonary & Critical Care Medicine ICU Progress Note  Chief complaint : Post cardiac arrest    Subjunctive/24 hour events :   Patient seen and examined during multidisciplinary rounds with RN, charge nurse, RT, pharmacy, dietitian, and social service. Patient is post rewarming, she open her eyes, tries to follow commands but very weak, she is currently on Levophed at 0.1 mcg/min, and vasopressin 0.04 units/min, urine output is good, no nausea no vomiting.       Social History     Tobacco Use    Smoking status: Former Smoker     Packs/day: 1.00     Years: 59.00     Pack years: 59.00     Types: Cigarettes     Start date: 2017    Smokeless tobacco: Never Used    Tobacco comment: quit 2-3 weeks ago    Substance Use Topics    Alcohol use: Not Currently         Problem Relation Age of Onset    Heart Disease Father         cardiac bypass    Arthritis Father     Arthritis Mother     Other Mother          at age 80    Other Sister         [facility]    No Known Problems Daughter     Stroke Son        Recent Labs     10/08/20  1624 10/09/20  0754   PHART 7.419 7.522*   KPM7BLB 54* 42   PO2ART 93* 108*       MV Settings:  Vent Mode: AC/VC Rate Set: 12 bmp/Vt Ordered: 280 mL/ /FiO2 : 35 %           IV:   propofol Stopped (10/09/20 0920)    sodium chloride      sodium chloride 125 mL/hr at 10/09/20 0905    fentaNYL (SUBLIMAZE) infusion      DOPamine Stopped (10/07/20 1631)    vasopressin (Septic Shock) infusion 0.04 Units/min (10/09/20 0904)    norepinephrine 8 mcg/min (10/09/20 0825)    dextrose         Vitals:  BP (!) 99/45   Pulse 83   Temp 96.1 °F (35.6 °C) (Bladder)   Resp 27   Ht 4' 11\" (1.499 m)   Wt 168 lb 6.9 oz (76.4 kg)   LMP  (LMP Unknown)   SpO2 100%   BMI 34.02 kg/m²    Tmax:        Intake/Output Summary (Last 24 hours) at 10/9/2020 1120  Last data filed at 10/9/2020 0700  Gross per 24 hour   Intake 3095.17 ml   Output 2249 ml   Net 846.17 ml       EXAM:  General: Awake, follows simple commands but weak  Head: normocephalic, atraumatic  Eyes:No gross abnormalities. ENT:  MMM no lesions, ET tube and OG tube in  Neck:  supple and no masses  Chest : Good air movement, no wheezing, no rales, tender anterior chest, tympanic  Heart[de-identified] Heart sounds are normal.  Regular rate and rhythm without murmur, gallop or rub. ABD:  symmetric, soft, non-tender, no guarding or rebound  Musculoskeletal : no cyanosis, no clubbing and no edema  Neuro: Awake, follows simple command  Skin: No rashes or nodules noted.   Lymph node:  no cervical nodes  Urology: Yes Sanchez   Psychiatric: Awake and calm    Medications:  Scheduled Meds:   insulin lispro  0-6 Units Subcutaneous TID WC    insulin lispro  0-3 Units Subcutaneous Nightly    lidocaine  5 mL Intradermal Once    sodium chloride flush  10 mL Intravenous 2 times per day    heparin (porcine)  5,000 Units Subcutaneous 3 times per day    hydrocortisone sodium succinate PF  50 mg Intravenous Q12H    potassium chloride  40 mEq Intravenous Once    chlorhexidine  15 mL Mouth/Throat BID    pantoprazole  40 mg Intravenous Daily    And    sodium chloride (PF)  10 mL Intravenous Daily    piperacillin-tazobactam  2.25 g Intravenous Q8H    [Held by provider] digoxin  125 mcg Oral Daily    [Held by provider] PARoxetine  40 mg Oral QAM    rosuvastatin  40 mg Oral QPM    sodium chloride flush  10 mL Intravenous 2 times per day       PRN Meds:  potassium chloride, sodium chloride flush, promethazine **OR** ondansetron, ondansetron, dicyclomine, sodium chloride flush, acetaminophen **OR** acetaminophen, glucose, dextrose, glucagon (rDNA), dextrose, iopamidol    Results: reviewed by me   CBC:   Recent Labs     10/07/20  0656  10/08/20  0546  10/08/20  1624 10/09/20  0600 10/09/20  0754   WBC 17.7*  --  11.7*  --   --  12.9*  --    HGB 9.1*   < > 8.3*   < > 13.2 8.8* 9.8*   HCT 29.9*  --  26.6*  --   --  27.3*  --    MCV 76.8*  --  74.7*  --   -- later today  · Watch for ICU delirium: TV on, natural light, avoid benzos, pain control, early mobility, and family engagement  · PUD prophylaxis  · DVT prophylaxis  · Continue broad-spectrum antibiotic  · CT head  · Start trophic tube feed  · Continue insulin drip, target blood sugar 1 40-1 80  · Continue pressors target mean arterial pressure 65-70  · Watch renal function and urine output  · Wean off Solu-Cortef  · Fluid management per nephrology  · Prognosis is guarded      Due to the immediate potential for life-threatening deterioration due to cardiac arrest I spent 39  minutes providing critical care.  This time is excluding time spent performing procedures.           Electronically signed by Rony Cardenas MD,  FCCP ,on 10/9/2020 at 11:20 AM

## 2020-10-09 NOTE — PROGRESS NOTES
1900: handoff of care received from [address] [place] [address]: Assessment completed. Patient in re-warming process. Labs drawn- critical potassium of 2.7, notified Dr. Marina Reynaga. MD ordered IV potassium, could not treat due to re-warming on artic sun.    2200: Labs drawn - potassium 2.8, notified Dr. Marina Reynaga per protocol and about treatment being delayed due to re-warming. BG 83, multiplier used per orders. 2300: BG 70, insulin drip stopped per orders. 0000: BG 74, insulin drip not started per orders; labs redrawn, awaiting results. 0100: Lab called with critical result of Potassium (2.9). BG 74, no changes to insulin drip. Propofol titrated to 40 mcg/kg/min due to patient moving extremities and still re-warming. 0200: rechecked BG per orders. BG 66. PRN D50 given. After 15 minutes .    0500: , did not use multiplier for insulin drip due to a high unit/hr number. Notified Artur Roberto and he said to use the multiplier 0.03 when BG returns to target range. Refer to nursing communication order.

## 2020-10-09 NOTE — FLOWSHEET NOTE
Patient off sedation - positive gag reflex, squeezes hands on command, attempting to open eyes, pupils reactive      1455 - patient extubated - placed on NC - tolerating well      1700 - changed all IV tubing - infusing into picc line

## 2020-10-09 NOTE — PLAN OF CARE
Nutrition Problem #1: Inadequate oral intake  Intervention: Food and/or Nutrient Delivery: (Once thermodynamically stable, start TF.  recommend Semi Elemental formula @ 20 ml/hr x 24 hrs with goal rate of 35 ml/hr. 50 ml water flush QID or as per physician)  Nutritional Goals: New goal with initiation of TF.   EN to meet range of estimated energy/protein needs with tolerance, need to confirm actual wt

## 2020-10-09 NOTE — PROGRESS NOTES
Comprehensive Nutrition Assessment    Type and Reason for Visit:  Reassess, Consult(TF order and manage)    Nutrition Recommendations/Plan:  Change to Semi Elemental formula. Continue rate at 20 ml/hr x 24 hrs   100 ml water flush @ 6 hrs    Nutrition Assessment:  Pt admitted with sepsis, remains intubatd for s/p cardiac arrest.  rewarming completed and trophic TF initiated, will modify current TF to Semi Elemental formula and monitor tolerance for ability to advance goal rate    Malnutrition Assessment:  Malnutrition Status:  Insufficient data    Context:  Acute Illness     Findings of the 6 clinical characteristics of malnutrition:  Energy Intake:  Unable to assess  Weight Loss:  No significant weight loss     Body Fat Loss:  No significant body fat loss     Muscle Mass Loss:  No significant muscle mass loss    Fluid Accumulation:  Unable to assess     Strength:  Not Performed    Estimated Daily Nutrient Needs:  Energy (kcal):  3921-0560 (kg x 20-22); Weight Used for Energy Requirements:  Current(60 kg)     Protein (g):  53-66 gm (kg IBW x 1.2-1.5); Weight Used for Protein Requirements:  Ideal(44 kg)        Fluid (ml/day):  6875-0923 (1 ml/kcal); Weight Used for Fluid Requirements:  Current      Nutrition Related Findings:  vent (10/7). s/p cardiac arrest, s/p arctic sun, labs reviewed gluc 310,  BUN 34, Cr 3.03, CVC line IVF @ 125 ml/hr, propofol off, OGT in place, BM (10/6), abdomen noted to be distended;rounded. Noted emesis on (10/6), 1+ generalized edema noted, I/O: 4911/3524 (200 ml OG output).   PMH: COPD, DM 2, CHF      Wounds:  None       Current Nutrition Therapies:    Diet NPO Effective Now  Diet Tube Feed Continuous/Cyclic w/ Diet   · Current Tube Feeding (TF) Orders:   · Feeding Route: Orogastric  · Formula: Standard w/Fiber  · Schedule: Continuous  · Water Flushes: 100 ml every 6 hrs  · Current TF & Flush Orders Provides: 480 ml, 576 kcal, 26 gm protein, ~780 ml free water  · Goal TF & Flush Orders Provides: With change in formula to Semi Elemental=480 ml, 576 kcal, 36 gm protein, ~ 390 ml free water    Anthropometric Measures:  · Height: 4' 11\" (149.9 cm)  · Current Body Weight: 168 lb (76.2 kg)(? accurate)   · Admission Body Weight: 134 lb (60.8 kg)(stated)    · [facility]: 133 [facility]60.3 kg[facility]     · Ideal Body Weight: 95 lbs; % Ideal Body Weight   > 100%  · BMI: 33.9  · BMI Categories: Overweight (BMI 25.0-29. 9)       Nutrition Diagnosis:   · Inadequate oral intake related to impaired respiratory function as evidenced by intubation    Nutrition Interventions:   Food and/or Nutrient Delivery:  (Once thermodynamically stable, start TF.  recommend Semi Elemental formula @ 20 ml/hr x 24 hrs with goal rate of 35 ml/hr. 50 ml water flush QID or as per physician)  Nutrition Education/Counseling:  Education not indicated   Coordination of Nutrition Care:  Continued Inpatient Monitoring    Goals:  New goal with initiation of TF. EN to meet range of estimated energy/protein needs with tolerance, need to confirm actual wt       Nutrition Monitoring and Evaluation:   Food/Nutrient Intake Outcomes:  Enteral Nutrition Intake/Tolerance  Physical Signs/Symptoms Outcomes:  Biochemical Data, GI Status, Hemodynamic Status, Weight, Other (Comment)(thermodynamic status)     Discharge Planning:     Too soon to determine     Electronically signed by Laureen Waddell RD, LD on 10/9/20 at 12:46 PM EDT

## 2020-10-09 NOTE — PROGRESS NOTES
2.25 g, Intravenous, Q8H **AND** Pharmacy consult for renal dosing, , , 1 Time  vasopressin 20 Units in dextrose 5 % 100 mL infusion, 0.04 Units/min, Intravenous, Continuous  norepinephrine (LEVOPHED) 16 mg in dextrose 5 % 250 mL infusion, 2 mcg/min, Intravenous, Continuous  ondansetron (ZOFRAN) injection 4 mg, 4 mg, Intravenous, Q6H PRN  dicyclomine (BENTYL) capsule 10 mg, 10 mg, Oral, 4x Daily PRN  [Held by provider] digoxin (LANOXIN) tablet 125 mcg, 125 mcg, Oral, Daily  [Held by provider] PARoxetine (PAXIL) tablet 40 mg, 40 mg, Oral, QAM  rosuvastatin (CRESTOR) tablet 40 mg, 40 mg, Oral, QPM  sodium chloride flush 0.9 % injection 10 mL, 10 mL, Intravenous, 2 times per day  sodium chloride flush 0.9 % injection 10 mL, 10 mL, Intravenous, PRN  acetaminophen (TYLENOL) tablet 650 mg, 650 mg, Oral, Q6H PRN **OR** acetaminophen (TYLENOL) suppository 650 mg, 650 mg, Rectal, Q6H PRN  glucose (GLUTOSE) 40 % oral gel 15 g, 15 g, Oral, PRN  dextrose 50 % IV solution, 12.5 g, Intravenous, PRN  glucagon (rDNA) injection 1 mg, 1 mg, Intramuscular, PRN  dextrose 5 % solution, 100 mL/hr, Intravenous, PRN  iopamidol (ISOVUE-300) 61 % injection 100 mL, 100 mL, Intravenous, ONCE PRN  Physical    VITALS:  BP (!) 99/45   Pulse 84   Temp 96.1 °F (35.6 °C) (Bladder)   Resp 24   Ht 4' 11\" (1.499 m)   Wt 168 lb 6.9 oz (76.4 kg)   LMP  (LMP Unknown)   SpO2 100%   BMI 34.02 kg/m²   Constitutional: intubated and sedated  Head: Normocephalic, atraumatic  Eyes: EOMI, PERRLA  ENT: moist mucous membranes, ETT in place  Neck: neck supple, trachea midline  Lungs: coarse breath sounds bilaterally, no wheeze  Heart: bradycardic, normal S1 and S2  GI: Soft, non-distended, +BS  MSK: no edema noted  Skin: warm, dry     Data    CBC:   Lab Results   Component Value Date    WBC 12.9 10/09/2020    RBC 3.73 10/09/2020    HGB 9.8 10/09/2020    HCT 27.3 10/09/2020    MCV 73.4 10/09/2020    MCH 23.7 10/09/2020    MCHC 32.3 10/09/2020    RDW 23.8 replacement while rewarming protocol in place  - nephrology consulted    #  Acute on chronic diastolic CHF  - exacerbated by cardiac arrest  - appears volume depleted at this time- pressors and IVF per cardiology  - holding diuretic and gently IVF    # HTN, HLD, depression, DM- hyperglycemia uncontrolled  - off insulin drip and BG more controlled today  - hyperglycemia likely 2/2 D5 drips which were changed to non-dextrose solutions and glucose now improved  - will add NPO sliding scale  - monitor potassium with insulin administration  - holding BP meds while hypotensive    DVT: Lovenox    Disposition: Pt with hx of ESBL UTI presented with UTI on cipro. Code blue 10/7 AM. ROSC obtained. Pt intubated and sedated and moved to the ICU on pressors. Continues to be hypotensive on pressors and abx. Pt is critically ill. Pulm and cardio consulted.     [facility] DO  Internal Medicine

## 2020-10-09 NOTE — PROGRESS NOTES
Chief Complaint   Patient presents with    Abdominal Pain     Patient is a 67 y.o. female who presents with a chief complaint of weakness. . Patient is followed on a regular basis by Dr. Stevan Kearns MD. Apparently patient had been expressing fevers and chills as well as weakness and presented to the emergency department, also complained of dysuria. Patient with history of diastolic congestive heart failure, chronic kidney disease, anemia, history of hypertrophic obstructive cardiomyopathy, gastric AVM. Status post cardiac catheterization in October 2019 that showed normal coronary arteries. Patient was admitted and being treated for urinary tract infection. Apparently she was noted to be hypotensive and bradycardic this morning and CODE BLUE was called. CPR was initiated and patient was given multiple rounds of epinephrine as well as atropine and CPR was initiated for approximate 20 minutes and ROSC was achieved. I was contacted by intensivist to evaluate patient for hypotension as well as bradycardia. Patient was thought to have an acute cardiac event. She was bradycardic in the 30s to 40s with blood pressure in the 70s to 80s on 2 pressors. 10/8/2020[de-identified] Patient is Arctic sun protocol still. She is intubated and sedated. She is currently off of IV pressors and her potassium has been corrected. Transvenous pacemaker in place and set at 45 bpm, currently she is in sinus bradycardia on telemetry heart rate in the 40s to 50s. Creatinine is 3.17. Potassium is 3.1. WBC is down to 11.     10/9/2020: Remains on Arctic sun and intubated and sedated. She is independent of transvenous pacemaker heart rate is currently normal sinus rhythm in the 80s. Potassium is actually low and is being corrected. She is not on any pressors, blood pressure stable.       Past Medical History        Past Medical History:   Diagnosis Date    Anxiety     Asthma     dx 2019 / has smoked since age 11    CHF (congestive heart failure) ([facility])     Chronic back pain     Bilateral L5 S1 Radic on emg--surprisingly worse on the left than the right--pt's symptoms and her MRI show worse on the right    Chronic obstructive pulmonary disease with acute exacerbation ([facility]) 10/12/2019    Depression     Fibromyalgia     Hyperlipidemia     meds > 8 yrs    Hypertension     meds > 45 yrs    On home O2     2l per n/c at bedtime mostly,     Osteoarthritis     Type II diabetes mellitus, uncontrolled ([facility])     hx > 8 yrs    Unspecified sleep apnea          Patient Active Problem List   Diagnosis    Hypertension    Hyperlipidemia    Uncontrolled type 2 diabetes mellitus with complication, without long-term current use of insulin ([facility])    Fibromyalgia    Anxiety    Smoker    Insomnia    DJD (degenerative joint disease), lumbar    Lumbosacral radiculopathy at S1    DDD (degenerative disc disease), lumbar    Dysphonia    CTS (carpal tunnel syndrome)    High risk medication use-Norco - 12/20/17 OARRS PM&R, 02/20/18 OARRS PM&R, 03/07/18 OARRS PM&R, Urine Drug screen negative 02/06/17 PM&R--MED CONTRACT 2/6/17    SOB (shortness of breath) on exertion    Chest pain    Memory deficit    Artificial lens present    Presbyopia    Astigmatism, regular    Cataract, nuclear sclerotic senile    IDDM (insulin dependent diabetes mellitus)    Regular astigmatism    Nuclear senile cataract    Neck pain    Lumbosacral radiculopathy at L5    Spinal stenosis of lumbar region with neurogenic claudication    Impaired mobility and activities of daily living    Non-compliant patient NO showed FU 1/10/17 Dr Stephanie Leone Insomnia secondary to chronic pain    Reactive depression    Diabetic radiculopathy (HCC)    Cervical radicular pain    Diabetic asymmetric polyneuropathy (HCC)    Myalgia    Intercostal neuropathy    Osteoarthritis of spine with radiculopathy, lumbar region    Acute combined systolic and diastolic CHF, NYHA class 1 ([facility]    PMB (postmenopausal bleeding)    Acute on chronic diastolic heart failure (HCC)    CHF (congestive heart failure) (HCC)    Hypertensive urgency    Uncontrolled type 2 diabetes mellitus with hyperglycemia (HCC)    Chronic obstructive pulmonary disease with acute exacerbation (HCC)    Acute on chronic diastolic (congestive) heart failure (HCC)    SAÚL (acute kidney injury) ([address]    Hypoglycemia    HOCM (hypertrophic obstructive cardiomyopathy) (HCC)    Gastrointestinal hemorrhage    COPD exacerbation (HCC)    Anemia    AVM (arteriovenous malformation)    Symptomatic anemia    Flash pulmonary edema (HCC)    Acute on chronic kidney failure (HCC)    Dysarthria    Chronic fatigue    Acute encephalopathy    Adenomatous polyp of sigmoid colon    Adenomatous polyp of transverse colon    Sepsis (HCC)    Major depressive disorder in remission ([facility]    Gastroesophageal reflux disease without esophagitis    Acute cystitis without hematuria    CKD (chronic kidney disease) stage 4, GFR 15-29 ml/min (HCC)     Past Surgical History         Past Surgical History:   Procedure Laterality Date    BACK SURGERY  2017    lumbar disc    CARDIAC CATHETERIZATION  11/3/14     DR. MIRELES / no stents    COLONOSCOPY  08/29/2016    w/polypectomy     COLONOSCOPY N/A 9/29/2020    COLONOSCOPY WITH POLYPECTOMY performed by Marleni Lim MD at [facility] N/A 10/7/2019    EUA HYSTEROSCOPY DILATATION AND CURETTAGE performed by Savana Nick DO at [facility]. [address], COLON, DIAGNOSTIC      EYE SURGERY      [place] with IOL OU / [id number]    umbilical hernia repair    IA ESOPHAGOGASTRODUODENOSCOPY TRANSORAL DIAGNOSTIC N/A 3/24/2017    EGD ESOPHAGOGASTRODUODENOSCOPY performed by Korina Escobedo MD at [place] [address] 2/8/2018    negative findings    IA REVISE MEDIAN N/CARPAL TUNNEL SURG Left 6/5/2017    LEFT CARPAL TUNNEL RELEASE performed by Josef Floyd MD at [facility] PIII,7+C/H,CVWRE N/A 2/8/2018    TONSILLECTOMY      as child    UPPER GASTROINTESTINAL ENDOSCOPY  08/26/2016    w/bx     UPPER GASTROINTESTINAL ENDOSCOPY N/A 2/25/2020    EGD possible biopsy performed by Angela Jiménez MD at [address] [facility] N/A 7/1/2020    EGD PUSH ENTEROSCOPY performed by Isma Briggs MD at [address] [facility] [address] History         Socioeconomic History    Marital status:      Spouse name: None    Number of children: None    Years of education: None    Highest education level: None   Occupational History    Occupation: Retired-   Social Needs    Financial resource strain: None    Food insecurity     Worry: None     Inability: None    Transportation needs     Medical: None     Non-medical: None   Tobacco Use    Smoking status: Former Smoker     Packs/day: 1.00     Years: 59.00     Pack years: 59.00     Types: Cigarettes     Start date: 11/30/2017    Smokeless tobacco: Never Used    Tobacco comment: quit 2-3 weeks ago    Substance and Sexual Activity    Alcohol use: Not Currently    Drug use: No    Sexual activity: Yes   Lifestyle    Physical activity     Days per week: None     Minutes per session: None    Stress: None   Relationships    Social connections     Talks on phone: None     Gets together: None     Attends Hoahaoism service: None     Active member of club or organization: None     Attends meetings of clubs or organizations: None     Relationship status: None    Intimate partner violence     Fear of current or ex partner: None     Emotionally abused: None     Physically abused: None     Forced sexual activity: None   Other Topics Concern    None   Social History Narrative    None     Family History         Family History   Problem Relation Age of Onset    Heart Disease Father cardiac bypass    Arthritis Father     Arthritis Mother     Other Mother      at age 80    Other Sister     [facility]    No Known Problems Daughter     Stroke Son      Current Facility-Administered Medications             Current Facility-Administered Medications   Medication Dose Route Frequency Provider Last Rate Last Dose    0.9 % sodium chloride bolus 500 mL Intravenous Once Vickie Peters DO      propofol 1000 MG/100ML injection          propofol injection 10 mcg/kg/min Intravenous Titrated Aminta Franklin MD      fentaNYL (SUBLIMAZE) 1,000 mcg in sodium chloride 0.9 % 100 mL infusion 25 mcg/hr Intravenous Continuous Aminta Franklin MD      sodium bicarbonate 150 mEq in dextrose 5 % 1,000 mL infusion  Intravenous Continuous Aminta Franklin  mL/hr at 10/07/20 0950     0.9 % sodium chloride infusion  Intravenous Once Aminta Franklin MD      0.9 % sodium chloride infusion  Intravenous Once Aminta Franklin MD      promethazine (PHENERGAN) tablet 12.5 mg 12.5 mg Oral Q6H PRN Aminta Franklin MD      Or    ondansetron (ZOFRAN) injection 4 mg 4 mg Intravenous Q6H PRN Aminta Franklin MD      chlorhexidine (PERIDEX) 0.12 % solution 15 mL 15 mL Mouth/Throat BID Aminta Franklin MD      DOPamine (INTROPIN) 400 mg in dextrose 5 % 250 mL infusion 5 mcg/kg/min Intravenous Continuous Aminta Franklin MD 11.2 mL/hr at 10/07/20 0951 5 mcg/kg/min at 10/07/20 0951    EPINEPHrine 1 MG/10ML injection          EPINEPHrine 1 MG/10ML injection          EPINEPHrine 1 MG/10ML injection          ondansetron (ZOFRAN) injection 4 mg 4 mg Intravenous F4P PRN Ramsey Simpson, DO  4 mg at  1437    carvedilol (COREG) tablet 50 mg 50 mg Oral BID Ronobir Tatiana, DO  50 mg at /47 2141    dicyclomine (BENTYL) capsule 10 mg 10 mg Oral 4x Daily PRN [employer]AR[employer], DO      digoxin (LANOXIN) tablet 125 mcg 125 mcg Oral Daily Ronobir Tatiana, DO  968 mcg at 10/06/20 0829    hydrALAZINE (APRESOLINE) tablet 100 mg 100 mg Oral BID Ronobir Tatiana, DO  842 mg at 70/32/19 2141    insulin glargine (LANTUS) injection vial 60 Units 60 Units Subcutaneous Nightly Ronobir Tatiana, DO      insulin lispro (HUMALOG) injection vial 20 Units 20 Units Subcutaneous TID  Ronobir Tatiana, DO  20 Units at 10/06/20 1635    pantoprazole (PROTONIX) tablet 40 mg 40 mg Oral QAM AC Ronobir Tatiana, DO  40 mg at 39/67/69 0629    PARoxetine (PAXIL) tablet 40 mg 40 mg Oral QAM Ronobir Tatiana, DO  40 mg at 99/70/36 0828    rosuvastatin (CRESTOR) tablet 40 mg 40 mg Oral QPM Ronobir Tatiana, DO  40 mg at 54/55/86 1635    verapamil (CALAN SR) extended release tablet 120 mg 120 mg Oral Nightly Ronobir Tatiana, DO  113 mg at 78/75/64 2140    sodium chloride flush 0.9 % injection 10 mL 10 mL Intravenous 2 times per day Brendan Dewey, DO      sodium chloride flush 0.9 % injection 10 mL 10 mL Intravenous PRN Ronobir Tatiana, DO      acetaminophen (TYLENOL) tablet 650 mg 650 mg Oral V3K PRN Ronobir Tatiana, DO      Or    acetaminophen (TYLENOL) suppository 650 mg 650 mg Rectal R3C PRN Ronobir Tatiana, DO      enoxaparin (LOVENOX) injection 30 mg 30 mg Subcutaneous Daily Ronobir Tatiana, DO  30 mg at 15/58/65 0829    insulin lispro (HUMALOG) injection vial 0-6 Units 0-6 Units Subcutaneous TID  Ronobir Tatiana, DO  2 Units at 10/06/20 1214    insulin lispro (HUMALOG) injection vial 0-3 Units 0-3 Units Subcutaneous Nightly Ronobir Tatiana, DO      glucose (GLUTOSE) 40 % oral gel 15 g 15 g Oral PRN Ronobir Tatiana, DO      dextrose 50 % IV solution 12.5 g Intravenous PRN Ronobir Tatiana, DO      glucagon (rDNA) injection 1 mg 1 mg Intramuscular PRN Ronobir Tatiana, DO      dextrose 5 % solution 100 mL/hr Intravenous PRN Ronobir Tatiana, DO      0.9 % sodium chloride infusion  Intravenous Continuous Ronobir Tatiana, DO 75 mL/hr at 10/06/20 1951     ciprofloxacin (CIPRO) IVPB 400 mg 400 mg Intravenous Q24H Cassidy Love Ceron, DO  Stopped at 10/06/20 1148    iopamidol (ISOVUE-300) 61 % injection 100 mL 100 mL Intravenous ONCE PRN DIANE Huerta     ALLERGIES: Ibuprofen; Metformin and related; and Darvon [propoxyphene hcl]   Review of Systems   Unable to perform ROS: Intubated     VITALS:   Blood pressure (!) 147/57, pulse 55, temperature 97.7 °F (36.5 °C), resp. rate 18, height 4' 11\" (1.499 m), weight 131 lb 2.8 oz (59.5 kg), SpO2 100 %, not currently breastfeeding. Body mass index is 26.49 kg/m². Physical Exam   HENT:   Head: Normocephalic and atraumatic. Neck: Neck supple. No JVD present. No tracheal deviation present. No thyromegaly present. Cardiovascular: Normal rate, regular rhythm, normal heart sounds and intact distal pulses. PMI is not displaced. Exam reveals no gallop, no S3, no distant heart sounds and no friction rub. No murmur heard. Pulmonary/Chest: No respiratory distress. She has no wheezes. She has no rales. She exhibits no tenderness. Abdominal: Soft. Bowel sounds are normal. She exhibits no distension and no mass. There is no abdominal tenderness. There is no rebound and no guarding. Musculoskeletal:   General: No edema. Neurological: No cranial nerve deficit. Skin: Skin is warm and dry. No rash noted. She is not diaphoretic. No erythema. No pallor.      LABS:   Recent Results         Recent Results (from the past 24 hour(s))   POCT Glucose    Collection Time: 10/06/20 11:02 AM   Result Value Ref Range    POC Glucose 216 (H) 60 - 115 mg/dl    Performed on ACCU-CHEK    POCT Glucose    Collection Time: 10/06/20 4:27 PM   Result Value Ref Range    POC Glucose 133 (H) 60 - 115 mg/dl    Performed on ACCU-CHEK    POCT Glucose    Collection Time: 10/06/20 8:33 PM   Result Value Ref Range    POC Glucose 81 60 - 115 mg/dl    Performed on ACCU-CHEK    POCT Glucose    Collection Time: 10/06/20 9:24 PM   Result Value Ref Range    POC Glucose 114 60 - 115 mg/dl    Performed on ACCU-CHEK    POCT Glucose    Collection Time: 10/07/20 6:33 AM   Result Value Ref Range    POC Glucose 221 (H) 60 - 115 mg/dl    Performed on ACCU-CHEK    CBC    Collection Time: 10/07/20 6:56 AM   Result Value Ref Range    WBC 17.7 (H) 4.8 - 10.8 K/uL    RBC 3.89 (L) 4.20 - 5.40 M/uL    Hemoglobin 9.1 (L) 12.0 - 16.0 g/dL    Hematocrit 29.9 (L) 37.0 - 47.0 %    MCV 76.8 (L) 82.0 - 100.0 fL    MCH 23.4 (L) 27.0 - 31.3 pg    MCHC 30.4 (L) 33.0 - 37.0 %    RDW 24.9 (H) 11.5 - 14.5 %    Platelets 745 208 - 228 K/uL   Basic Metabolic Panel w/ Reflex to MG    Collection Time: 10/07/20 6:56 AM   Result Value Ref Range    Sodium 135 135 - 144 mEq/L    Potassium reflex Magnesium 5.6 (H) 3.4 - 4.9 mEq/L    Chloride 100 95 - 107 mEq/L    CO2 22 20 - 31 mEq/L    Anion Gap 13 9 - 15 mEq/L    Glucose 231 (H) 70 - 99 mg/dL    BUN 44 (H) 8 - 23 mg/dL    CREATININE 3.45 (H) 0.50 - 0.90 mg/dL    GFR Non-African American 12.9 (L) >60    GFR  15.6 (L) >60    Calcium 8.6 8.5 - 9.9 mg/dL   POCT Glucose    Collection Time: 10/07/20 8:05 AM   Result Value Ref Range    POC Glucose 274 (H) 60 - 115 mg/dl    Performed on ACCU-CHEK    POCT Arterial    Collection Time: 10/07/20 9:16 AM   Result Value Ref Range    POC Sodium 139 136 - 145 mEq/L    POC Potassium 5.8 (H) 3.5 - 5.1 mEq/L    POC Chloride 101 99 - 110 mEq/L    POC Glucose 385 (H) 60 - 115 mg/dl    POC Creatinine 3.4 (H) 0.6 - 1.2 mg/dL    GFR Non-African American 13 (A) >60    GFR  16 (A) >60    Calcium, Ion 1.04 (L) 1.12 - 1.32 mmol/L    pH, Arterial 7.246 (L) 7.350 - 7.450    pCO2, Arterial 49 (H) 35 - 45 mm Hg    pO2, Arterial 516 (HH) 75 - 108 mm Hg    HCO3, Arterial 21.3 21.0 - 29.0 mmol/L    Base Excess, Arterial -6 (L) -3 - 3    O2 Sat, Arterial 100 (HH) 93 - 100 %    TCO2, Arterial 23 22 - 29    Lactate 9.14 (HH) 0.40 - 2.00 mmol/L    POC Hematocrit 27 (L) 36 - 48 %    Hemoglobin 9.0 (L) 12.0 - 16.0 gm/dL    FIO2 100.000     Sample Type ART     Performed on SEE BELOW    Troponin:         Lab Results   Component Value Date    TROPONINI 0.041 10/05/2020     EKG: normal sinus rhythm, lvh, LATERAL ST DEPRESSION       ASSESSMENT:     Status post cardiopulmonary arrest questionable secondary to septic shock S  Urosepsis/septic shock   Sinus bradycardia/sick sinus syndrome status post temporary transvenous pacemaker secondary to hyperkalemia versus other- resolved. History of hypertrophic obstructive cardiomyopathy   History of moderate mid LAD CAD. Vasospasm noted and improved with nitro. Chronic kidney disease   History of anemia   Normal LV function   Hx of HTN   HLP   Hyperkalemia      PLAN:   1. As always, aggressive risk factor modification is strongly recommended. We should adhere to the JNC VIII guidelines for HTN management and the NCEPATP III guidelines for LDL-C management. 2. Arctic sun protocol  3. Discontinue temporary pacemaker today if remains independent, heart rate is controlled and potassium is corrected. 4. DC Coreg, digoxin  and Verapamil given hypotension and bradycardia. 5. ICU SUPPORTIVE CARE AND MANAGEMENT  6. Avoid any nephrotoxic agents. 7. GI/DVT PROPH    Thank you for allowing me to participate in the care of your patient, please don't hesitate to contact me if you have any further questions.        Electronically signed by Christiana Licona DO on 10/9/2020 at 8:17 AM

## 2020-10-09 NOTE — CARE COORDINATION
PT STILL REMAINS ON VENT. LAST PLAN DETERMINED HOME AT ID. WILL NEED TO FOLLOW AND MONITOR NEEDS POST EXTUBATION.

## 2020-10-09 NOTE — PROGRESS NOTES
Nephrology Progress Note    Assessment:  67y.o. year old female with history s/f T2DM, AMY, HTN, HLD, depression, fibromyalgia, chronic back pain, CHF, COPD and CKD stage III who presented for dysuria. Was being rx'd for UTI however pt suffered cardiac arrest (10/07)     1. SAÚL on CKD: etiology unclear for initial worsening as not hypotensive, no contrast or nephrotoxic medications, likely however has contribution from cardiac arrest  2. CKD stage III: most likely in setting of T2DM/HTN, baseline Scr ~1.1-1.2 w/ eGFR mid/high 50s   3. Hyperkalemia 2/2 cardiac arrest, corrected w/ medical management, now hypokalemic likely due to insulin gtt   4. Cardiac arrest: CPR for ~1 hr then another ~15 mins  5. Bradycardia s/p pacemaker placement  6. Lactic acidosis: improving   7. Hyperphosphatemia   8.  Metabolic alkalosis 2/2 bicarb gtt      Plan:  - continue NS   - replete K, expect K to remain stable now that pt off bicarb gtt and insulin gtt     Patient Active Problem List:     Hypertension     Hyperlipidemia     Uncontrolled type 2 diabetes mellitus with complication, without long-term current use of insulin (HCC)     Fibromyalgia     Anxiety     Smoker     Insomnia     DJD (degenerative joint disease), lumbar     Lumbosacral radiculopathy at S1     DDD (degenerative disc disease), lumbar     Dysphonia     CTS (carpal tunnel syndrome)     High risk medication use-Norco - 12/20/17 OARRS PM&R, 02/20/18 OARRS PM&R, 03/07/18 OARRS PM&R, Urine Drug screen negative 02/06/17 PM&R--MED CONTRACT 2/6/17     SOB (shortness of breath) on exertion     Chest pain     Memory deficit     Artificial lens present     Presbyopia     Astigmatism, regular     Cataract, nuclear sclerotic senile     IDDM (insulin dependent diabetes mellitus)     Regular astigmatism     Nuclear senile cataract     Neck pain     Lumbosacral radiculopathy at L5     Spinal stenosis of lumbar region with neurogenic claudication     Impaired mobility and activities hydrocortisone sodium succinate PF  50 mg Intravenous Q12H    potassium chloride  40 mEq Intravenous Once    chlorhexidine  15 mL Mouth/Throat BID    pantoprazole  40 mg Intravenous Daily    And    sodium chloride (PF)  10 mL Intravenous Daily    piperacillin-tazobactam  2.25 g Intravenous Q8H    [Held by provider] digoxin  125 mcg Oral Daily    [Held by provider] PARoxetine  40 mg Oral QAM    rosuvastatin  40 mg Oral QPM    sodium chloride flush  10 mL Intravenous 2 times per day     Continuous Infusions:   propofol Stopped (10/09/20 0920)    sodium chloride      sodium chloride 125 mL/hr at 10/09/20 0905    fentaNYL (SUBLIMAZE) infusion      DOPamine Stopped (10/07/20 1631)    vasopressin (Septic Shock) infusion 0.04 Units/min (10/09/20 0904)    norepinephrine 8 mcg/min (10/09/20 0825)    dextrose         CBC:   Recent Labs     10/08/20  0546  10/09/20  0600 10/09/20  0754   WBC 11.7*  --  12.9*  --    HGB 8.3*   < > 8.8* 9.8*     --  199  --     < > = values in this interval not displayed. CMP:    Recent Labs     10/08/20  1530  10/09/20  0000  10/09/20  0600 10/09/20  0754 10/09/20  0800     --  131*  --  136  --   --    K 3.1*   < > not done  2.9*   < > 3.1*  3.1*  --  3.2*   CL 89*  --  87*  --  89*  --   --    CO2 36*  --  36*  --  31  --   --    BUN 36*  --  35*  --  34*  --   --    CREATININE 3.17*   < > 2.88*  --  3.03* 3.4*  --    GLUCOSE 162*  --  64*  --  141*  --   --    CALCIUM 7.3*  --  6.6*  --  6.6*  --   --    LABGLOM 14.2*   < > 15.9*  --  15.0* 13*  --     < > = values in this interval not displayed. Troponin:   No results for input(s): TROPONINI in the last 72 hours. BNP: No results for input(s): BNP in the last 72 hours. INR:   Recent Labs     10/07/20  1343   INR 2.3     Lipids:   No results for input(s): CHOL, LDLDIRECT, TRIG, HDL, AMYLASE, LIPASE in the last 72 hours.   Liver:   No results for input(s): AST, ALT, ALKPHOS, PROT, LABALBU, BILITOT in the last 72 hours. Invalid input(s): BILDIR  Iron:  No results for input(s): IRONS, FERRITIN in the last 72 hours. Invalid input(s): LABIRONS  Urinalysis: No results for input(s): UA in the last 72 hours.     Objective:  Vitals: BP (!) 99/45   Pulse 83   Temp 96.1 °F (35.6 °C) (Bladder)   Resp 27   Ht 4' 11\" (1.499 m)   Wt 168 lb 6.9 oz (76.4 kg)   LMP  (LMP Unknown)   SpO2 100%   BMI 34.02 kg/m²    Wt Readings from Last 3 Encounters:   10/09/20 168 lb 6.9 oz (76.4 kg)   09/29/20 130 lb (59 kg)   08/24/20 130 lb (59 kg)      24HR INTAKE/OUTPUT:      Intake/Output Summary (Last 24 hours) at 10/9/2020 1050  Last data filed at 10/9/2020 0700  Gross per 24 hour   Intake 3336.74 ml   Output 2424 ml   Net 912.74 ml       General: intubated, responsive  HEENT: normocephalic, atraumatic, ETT in place  Lungs: intubated, on vent, coarse breath sounds  Heart: regular rate and rhythm, no murmurs or rubs  Abdomen: soft, non-tender, non-distended  Ext: no cyanosis, no peripheral edema  Neuro: off sedation, responsive, +gag    Electronically signed by Bryan Cronin MD

## 2020-10-10 LAB
ABO/RH: NORMAL
ANION GAP SERPL CALCULATED.3IONS-SCNC: 12 MEQ/L (ref 9–15)
ANTIBODY SCREEN: NORMAL
BUN BLDV-MCNC: 37 MG/DL (ref 8–23)
CALCIUM SERPL-MCNC: 6.3 MG/DL (ref 8.5–9.9)
CHLORIDE BLD-SCNC: 94 MEQ/L (ref 95–107)
CO2: 29 MEQ/L (ref 20–31)
CREAT SERPL-MCNC: 3.58 MG/DL (ref 0.5–0.9)
GFR AFRICAN AMERICAN: 15
GFR NON-AFRICAN AMERICAN: 12.4
GLUCOSE BLD-MCNC: 259 MG/DL (ref 70–99)
GLUCOSE BLD-MCNC: 272 MG/DL (ref 60–115)
GLUCOSE BLD-MCNC: 284 MG/DL (ref 60–115)
GLUCOSE BLD-MCNC: 287 MG/DL (ref 60–115)
GLUCOSE BLD-MCNC: 288 MG/DL (ref 60–115)
HCT VFR BLD CALC: 22.3 % (ref 37–47)
HCT VFR BLD CALC: 26.4 % (ref 37–47)
HEMOGLOBIN: 7 G/DL (ref 12–16)
HEMOGLOBIN: 8.4 G/DL (ref 12–16)
MAGNESIUM: 1.9 MG/DL (ref 1.7–2.4)
MCH RBC QN AUTO: 22.9 PG (ref 27–31.3)
MCHC RBC AUTO-ENTMCNC: 31.2 % (ref 33–37)
MCV RBC AUTO: 73.4 FL (ref 82–100)
PDW BLD-RTO: 24.2 % (ref 11.5–14.5)
PERFORMED ON: ABNORMAL
PLATELET # BLD: 180 K/UL (ref 130–400)
PLATELET SLIDE REVIEW: NORMAL
POTASSIUM REFLEX MAGNESIUM: 3.5 MEQ/L (ref 3.4–4.9)
RBC # BLD: 3.04 M/UL (ref 4.2–5.4)
SLIDE REVIEW: ABNORMAL
SODIUM BLD-SCNC: 135 MEQ/L (ref 135–144)
WBC # BLD: 13.7 K/UL (ref 4.8–10.8)

## 2020-10-10 PROCEDURE — 2700000000 HC OXYGEN THERAPY PER DAY

## 2020-10-10 PROCEDURE — 80048 BASIC METABOLIC PNL TOTAL CA: CPT

## 2020-10-10 PROCEDURE — 86850 RBC ANTIBODY SCREEN: CPT

## 2020-10-10 PROCEDURE — 2000000000 HC ICU R&B

## 2020-10-10 PROCEDURE — 6370000000 HC RX 637 (ALT 250 FOR IP): Performed by: INTERNAL MEDICINE

## 2020-10-10 PROCEDURE — 85018 HEMOGLOBIN: CPT

## 2020-10-10 PROCEDURE — 99233 SBSQ HOSP IP/OBS HIGH 50: CPT | Performed by: INTERNAL MEDICINE

## 2020-10-10 PROCEDURE — P9016 RBC LEUKOCYTES REDUCED: HCPCS

## 2020-10-10 PROCEDURE — C9113 INJ PANTOPRAZOLE SODIUM, VIA: HCPCS | Performed by: INTERNAL MEDICINE

## 2020-10-10 PROCEDURE — 6360000002 HC RX W HCPCS: Performed by: INTERNAL MEDICINE

## 2020-10-10 PROCEDURE — 2580000003 HC RX 258: Performed by: STUDENT IN AN ORGANIZED HEALTH CARE EDUCATION/TRAINING PROGRAM

## 2020-10-10 PROCEDURE — 85014 HEMATOCRIT: CPT

## 2020-10-10 PROCEDURE — 36430 TRANSFUSION BLD/BLD COMPNT: CPT

## 2020-10-10 PROCEDURE — 86901 BLOOD TYPING SEROLOGIC RH(D): CPT

## 2020-10-10 PROCEDURE — 86900 BLOOD TYPING SEROLOGIC ABO: CPT

## 2020-10-10 PROCEDURE — 83735 ASSAY OF MAGNESIUM: CPT

## 2020-10-10 PROCEDURE — 2580000003 HC RX 258: Performed by: INTERNAL MEDICINE

## 2020-10-10 PROCEDURE — 85027 COMPLETE CBC AUTOMATED: CPT

## 2020-10-10 PROCEDURE — 2500000003 HC RX 250 WO HCPCS: Performed by: INTERNAL MEDICINE

## 2020-10-10 PROCEDURE — 86923 COMPATIBILITY TEST ELECTRIC: CPT

## 2020-10-10 PROCEDURE — 76937 US GUIDE VASCULAR ACCESS: CPT

## 2020-10-10 PROCEDURE — 36415 COLL VENOUS BLD VENIPUNCTURE: CPT

## 2020-10-10 RX ORDER — CARVEDILOL 6.25 MG/1
6.25 TABLET ORAL 2 TIMES DAILY WITH MEALS
Status: DISCONTINUED | OUTPATIENT
Start: 2020-10-10 | End: 2020-10-12

## 2020-10-10 RX ORDER — CLONIDINE HYDROCHLORIDE 0.1 MG/1
0.2 TABLET ORAL 2 TIMES DAILY
Status: DISCONTINUED | OUTPATIENT
Start: 2020-10-10 | End: 2020-10-12

## 2020-10-10 RX ORDER — VERAPAMIL HYDROCHLORIDE 240 MG/1
120 TABLET, FILM COATED, EXTENDED RELEASE ORAL NIGHTLY
Status: DISCONTINUED | OUTPATIENT
Start: 2020-10-10 | End: 2020-10-20 | Stop reason: HOSPADM

## 2020-10-10 RX ORDER — LABETALOL HYDROCHLORIDE 5 MG/ML
10 INJECTION, SOLUTION INTRAVENOUS EVERY 6 HOURS PRN
Status: DISCONTINUED | OUTPATIENT
Start: 2020-10-10 | End: 2020-10-15

## 2020-10-10 RX ORDER — FUROSEMIDE 10 MG/ML
40 INJECTION INTRAMUSCULAR; INTRAVENOUS ONCE
Status: COMPLETED | OUTPATIENT
Start: 2020-10-10 | End: 2020-10-10

## 2020-10-10 RX ORDER — 0.9 % SODIUM CHLORIDE 0.9 %
20 INTRAVENOUS SOLUTION INTRAVENOUS ONCE
Status: DISCONTINUED | OUTPATIENT
Start: 2020-10-10 | End: 2020-10-20 | Stop reason: HOSPADM

## 2020-10-10 RX ORDER — HYDRALAZINE HYDROCHLORIDE 20 MG/ML
10 INJECTION INTRAMUSCULAR; INTRAVENOUS EVERY 6 HOURS PRN
Status: DISCONTINUED | OUTPATIENT
Start: 2020-10-10 | End: 2020-10-10

## 2020-10-10 RX ADMIN — CARVEDILOL 6.25 MG: 6.25 TABLET, FILM COATED ORAL at 12:31

## 2020-10-10 RX ADMIN — PANTOPRAZOLE SODIUM 40 MG: 40 INJECTION, POWDER, FOR SOLUTION INTRAVENOUS at 08:43

## 2020-10-10 RX ADMIN — HYDROCORTISONE SODIUM SUCCINATE 100 MG: 100 INJECTION, POWDER, FOR SOLUTION INTRAMUSCULAR; INTRAVENOUS at 20:22

## 2020-10-10 RX ADMIN — Medication 10 ML: at 08:43

## 2020-10-10 RX ADMIN — PIPERACILLIN AND TAZOBACTAM 2.25 G: 2; .25 INJECTION, POWDER, LYOPHILIZED, FOR SOLUTION INTRAVENOUS at 03:44

## 2020-10-10 RX ADMIN — HYDROCORTISONE SODIUM SUCCINATE 100 MG: 100 INJECTION, POWDER, FOR SOLUTION INTRAMUSCULAR; INTRAVENOUS at 08:43

## 2020-10-10 RX ADMIN — VERAPAMIL HYDROCHLORIDE 120 MG: 240 TABLET, FILM COATED, EXTENDED RELEASE ORAL at 12:31

## 2020-10-10 RX ADMIN — INSULIN LISPRO 3 UNITS: 100 INJECTION, SOLUTION INTRAVENOUS; SUBCUTANEOUS at 16:41

## 2020-10-10 RX ADMIN — PIPERACILLIN AND TAZOBACTAM 2.25 G: 2; .25 INJECTION, POWDER, LYOPHILIZED, FOR SOLUTION INTRAVENOUS at 22:22

## 2020-10-10 RX ADMIN — INSULIN LISPRO 3 UNITS: 100 INJECTION, SOLUTION INTRAVENOUS; SUBCUTANEOUS at 12:14

## 2020-10-10 RX ADMIN — LABETALOL HYDROCHLORIDE 10 MG: 5 INJECTION, SOLUTION INTRAVENOUS at 13:02

## 2020-10-10 RX ADMIN — SODIUM CHLORIDE: 9 INJECTION, SOLUTION INTRAVENOUS at 00:47

## 2020-10-10 RX ADMIN — HEPARIN SODIUM 5000 UNITS: 5000 INJECTION INTRAVENOUS; SUBCUTANEOUS at 20:22

## 2020-10-10 RX ADMIN — PIPERACILLIN AND TAZOBACTAM 2.25 G: 2; .25 INJECTION, POWDER, LYOPHILIZED, FOR SOLUTION INTRAVENOUS at 14:10

## 2020-10-10 RX ADMIN — SODIUM CHLORIDE: 9 INJECTION, SOLUTION INTRAVENOUS at 08:57

## 2020-10-10 RX ADMIN — Medication 10 ML: at 22:24

## 2020-10-10 RX ADMIN — HEPARIN SODIUM 5000 UNITS: 5000 INJECTION INTRAVENOUS; SUBCUTANEOUS at 05:27

## 2020-10-10 RX ADMIN — ROSUVASTATIN CALCIUM 40 MG: 40 TABLET, FILM COATED ORAL at 20:21

## 2020-10-10 RX ADMIN — Medication 10 ML: at 20:26

## 2020-10-10 RX ADMIN — CLONIDINE HYDROCHLORIDE 0.2 MG: 0.1 TABLET ORAL at 16:32

## 2020-10-10 RX ADMIN — INSULIN LISPRO 3 UNITS: 100 INJECTION, SOLUTION INTRAVENOUS; SUBCUTANEOUS at 08:44

## 2020-10-10 RX ADMIN — Medication 10 ML: at 09:00

## 2020-10-10 RX ADMIN — FUROSEMIDE 40 MG: 10 INJECTION, SOLUTION INTRAMUSCULAR; INTRAVENOUS at 10:02

## 2020-10-10 RX ADMIN — HEPARIN SODIUM 5000 UNITS: 5000 INJECTION INTRAVENOUS; SUBCUTANEOUS at 15:13

## 2020-10-10 ASSESSMENT — PAIN SCALES - GENERAL
PAINLEVEL_OUTOF10: 0

## 2020-10-10 NOTE — PROGRESS NOTES
CRITICAL CARE PROGRESS NOTES    PATIENT NAME: Fany Portillo  MRN: [id number]  SERVICE DATE:  October 10, 2020   SERVICE TIME:  2:20 PM      PRIMARY SERVICE: Critical care medicine    CHIEF COMPLAINTS: Respiratory failure, sepsis    INTERVAL HPI: Patient seen and examined at bedside, Interval Notes, orders reviewed. Discussed on multidisciplinary rounds  Patient is doing well since extubation yesterday, she is somewhat tachypneic and reports some dyspnea at rest today with upper airway wheezes noted but maintains excellent oxygenation on nasal cannula, has some cough but little sputum production. She is hypertensive requiring PRN therapy now. OBJECTIVE    Body mass index is 32.59 kg/m². PHYSICAL EXAM:  Vitals:  BP (!) 190/63   Pulse 93   Temp 98.2 °F (36.8 °C) (Oral)   Resp 18   Ht 4' 11\" (1.499 m)   Wt 161 lb 6 oz (73.2 kg)   LMP  (LMP Unknown)   SpO2 100%   BMI 32.59 kg/m²   General: Patient is currently alert, awake . comfortable in bed, No distress. Head: Atraumatic , Normocephalic   Eyes: PERRL. No icteric sclera. No conjunctival injection. No discharge   ENT: No nasal  discharge. Pharynx clear. Neck:  Trachea midline. No thyromegaly, no JVD, No cervical adenopathy. Chest : Increased spontaneous breathing effort, symmetric bilateral excursions  Lung : Mildly diminished breath sounds bilaterally, expiratory wheezes noted in the central upper airway region  Heart[de-identified] Regular rhythm and rate. No mumur ,  Rub or gallop  ABD: Benign. Non-tender. Non-distended. No masses or organmegaly. Normal bowel sounds. EXT: Trace pitting edema both lower extremities , No Cyanosis No clubbing  Neuro: no focal weakness  Skin: Warm and dry. No erythema or rash on exposed extremities.       DATA:   Recent Labs     10/09/20  0600  10/09/20  1441 10/10/20  0547   WBC 12.9*  --   --  13.7*   HGB 8.8*   < > 8.0* 7.0*   HCT 27.3*  --   --  22.3*   MCV 73.4*  --   --  73.4*     --   --  180    < > = values in this interval not displayed.      Recent Labs     10/09/20  2043 10/10/20  0545    135   K 3.5 3.5   CL 92* 94*   CO2 31 29   BUN 35* 37*   CREATININE 3.26* 3.58*   GLUCOSE 249* 259*   CALCIUM 6.2* 6.3*   LABGLOM 13.8* 12.4*   GFRAA 16.7* 15.0*       MV Settings:     Vent Mode: (S) CPAP  Vt Ordered: 280 mL  Rate Set: 12 bmp  FiO2 : 100 %  PEEP/CPAP: 5  Pressure Support: 5 cmH20  Peak Inspiratory Pressure: 12 cmH2O  Mean Airway Pressure: 7.7 cmH20  I:E Ratio: 1:5.20    Recent Labs     10/09/20  1441   PHART 7.500*   QNB1BPK 40   PO2ART 90*   DUF6QVA 30.8*   BEART 8*   [id number] 98*       O2 Device: Nasal cannula  O2 Flow Rate (L/min): 3 L/min    Dietary Nutrition Supplements: Diabetic Oral Supplement  DIET CARB CONTROL; Carb Control: 3 carb choices (45 gms)/meal; Low Sodium (2 GM)  Dietary Nutrition Supplements: Diabetic Oral Supplement     MEDICATIONS during current hospitalization:    Continuous Infusions:   dextrose         Scheduled Meds:   sodium chloride  20 mL Intravenous Once    carvedilol  6.25 mg Oral BID WC    verapamil  120 mg Oral Nightly    insulin lispro  0-6 Units Subcutaneous TID WC    insulin lispro  0-3 Units Subcutaneous Nightly    sodium chloride flush  10 mL Intravenous 2 times per day    heparin (porcine)  5,000 Units Subcutaneous 3 times per day    hydrocortisone sodium succinate PF  100 mg Intravenous BID    Followed by   Caryl Sage ON 10/11/2020] hydrocortisone sodium succinate PF  100 mg Intravenous Daily    potassium chloride  40 mEq Intravenous Once    pantoprazole  40 mg Intravenous Daily    And    sodium chloride (PF)  10 mL Intravenous Daily    piperacillin-tazobactam  2.25 g Intravenous Q8H    [Held by provider] digoxin  125 mcg Oral Daily    [Held by provider] PARoxetine  40 mg Oral QAM    rosuvastatin  40 mg Oral QPM    sodium chloride flush  10 mL Intravenous 2 times per day       PRN Meds:labetalol, potassium chloride, sodium chloride flush, promethazine **OR**

## 2020-10-10 NOTE — PROGRESS NOTES
Nephrology Progress Note    Assessment:  SAÚL related to cardiac arrest on CKD-3       Hypotensive bradycardic  Anemia  DM type-2  Hypertension  Extubated yesterday      Plan:one unit RBC  Watch I&O follow labs    Patient Active Problem List:     Hypertension     Hyperlipidemia     Uncontrolled type 2 diabetes mellitus with complication, without long-term current use of insulin (HCC)     Fibromyalgia     Anxiety     Smoker     Insomnia     DJD (degenerative joint disease), lumbar     Lumbosacral radiculopathy at S1     DDD (degenerative disc disease), lumbar     Dysphonia     CTS (carpal tunnel syndrome)     High risk medication use-Norco - 12/20/17 OARRS PM&R, 02/20/18 OAR[facility] PM&R, 03/07/18 O[facility] PM&R, Urine Drug screen negative 02/06/17 PM&R--MED CONTRACT 2/6/17     SOB (shortness of breath) on exertion     Chest pain     Memory deficit     Artificial lens present     Presbyopia     Astigmatism, regular     Cataract, nuclear sclerotic senile     IDDM (insulin dependent diabetes mellitus)     Regular astigmatism     Nuclear senile cataract     Neck pain     Lumbosacral radiculopathy at L5     Spinal stenosis of lumbar region with neurogenic claudication     Impaired mobility and activities of daily living     Non-compliant patient NO showed FU 1/10/17 Dr Sari Hodge     Insomnia secondary to chronic pain     Reactive depression     Diabetic radiculopathy (HCC)     Cervical radicular pain     Diabetic asymmetric polyneuropathy (HCC)     Myalgia     Intercostal neuropathy     Osteoarthritis of spine with radiculopathy, lumbar region     Acute combined systolic and diastolic CHF, NYHA class 1 (HCC)     PMB (postmenopausal bleeding)     Acute on chronic diastolic heart failure (HCC)     CHF (congestive heart failure) ([address]     Hypertensive urgency     Uncontrolled type 2 diabetes mellitus with hyperglycemia ([address]     Chronic obstructive pulmonary disease with acute exacerbation (HCC)     Acute on chronic diastolic (congestive) heart failure (HCC)     SAÚL (acute kidney injury) ([facility]     Hypoglycemia     HOCM (hypertrophic obstructive cardiomyopathy) (HCC)     Gastrointestinal hemorrhage     COPD exacerbation (HCC)     Anemia     AVM (arteriovenous malformation)     Symptomatic anemia     Flash pulmonary edema (HCC)     Acute on chronic kidney failure (HCC)     Dysarthria     Chronic fatigue     Acute encephalopathy     Adenomatous polyp of sigmoid colon     Adenomatous polyp of transverse colon     Sepsis (HCC)     Major depressive disorder in remission (HCC)     Gastroesophageal reflux disease without esophagitis     Acute cystitis without hematuria     CKD (chronic kidney disease) stage 4, GFR 15-29 ml/min (HCC)     Cardiopulmonary arrest ([facility]      Subjective:  Admit Date: 10/5/2020    Interval History: weak    Medications:  Scheduled Meds:   sodium chloride  20 mL Intravenous Once    furosemide  40 mg Intravenous Once    insulin lispro  0-6 Units Subcutaneous TID WC    insulin lispro  0-3 Units Subcutaneous Nightly    sodium chloride flush  10 mL Intravenous 2 times per day    heparin (porcine)  5,000 Units Subcutaneous 3 times per day    hydrocortisone sodium succinate PF  100 mg Intravenous BID    Followed by   Silviano Monroe ON 10/11/2020] hydrocortisone sodium succinate PF  100 mg Intravenous Daily    potassium chloride  40 mEq Intravenous Once    pantoprazole  40 mg Intravenous Daily    And    sodium chloride (PF)  10 mL Intravenous Daily    piperacillin-tazobactam  2.25 g Intravenous Q8H    [Held by provider] digoxin  125 mcg Oral Daily    [Held by provider] PARoxetine  40 mg Oral QAM    rosuvastatin  40 mg Oral QPM    sodium chloride flush  10 mL Intravenous 2 times per day     Continuous Infusions:   sodium chloride 125 mL/hr at 10/10/20 0857    vasopressin (Septic Shock) infusion Stopped (10/09/20 2205)    norepinephrine Stopped (10/10/20 0300)    dextrose         CBC:   Recent Labs     10/09/20  0600 abnormalities  Psych: normal mood and affect  Skin: no rash      Electronically signed by Julio Flaherty MD

## 2020-10-10 NOTE — PROGRESS NOTES
[address] [place] [address] handoff preformed   2000 assessments preformed as charted patient had no complaints passed swallow screening with no complications pt complained of sore throat post extubation. 2200 CVC line removed per protocol due to PICC placement today CVC removed with no complications pt had a small bowel movement. vasopressin stopped and levophed titrated down    0000 assessments preformed. Pt had a small bowel movement Art line visibly soiled dressing changed. 0005 levophed restarted per orders to maintain Map above 65  0400 assessments preformed as charted. 0500 pt linen changed pt had no needs at this time.    Electronically signed by Judith Olivares RN on 10/10/2020 at 6:22 AM

## 2020-10-10 NOTE — PLAN OF CARE
Nutrition Problem #1: Inadequate oral intake  Intervention: Food and/or Nutrient Delivery: Modify Current Diet(Carb 3 Low Na+ diet/ provide diabetic supplement tid)  Nutritional Goals: New goal with po diet initiated. PO intake >50% of meals/supplement.

## 2020-10-10 NOTE — PROGRESS NOTES
Department of Internal Medicine  General Internal Medicine  Attending Progress Note      SUBJECTIVE:  Pt seen and examined. Extubated yesterday. Awake and alert. No complaints. States she is hungry and wants to eat.     OBJECTIVE      Medications    Current Facility-Administered Medications: 0.9 % sodium chloride bolus, 20 mL, Intravenous, Once  furosemide (LASIX) injection 40 mg, 40 mg, Intravenous, Once  hydrALAZINE (APRESOLINE) injection 10 mg, 10 mg, Intravenous, Q6H PRN  potassium chloride 20 mEq/50 mL IVPB (Central Line), 20 mEq, Intravenous, PRN  insulin lispro (HUMALOG) injection vial 0-6 Units, 0-6 Units, Subcutaneous, TID WC  insulin lispro (HUMALOG) injection vial 0-3 Units, 0-3 Units, Subcutaneous, Nightly  sodium chloride flush 0.9 % injection 10 mL, 10 mL, Intravenous, 2 times per day  sodium chloride flush 0.9 % injection 10 mL, 10 mL, Intravenous, PRN  heparin (porcine) injection 5,000 Units, 5,000 Units, Subcutaneous, 3 times per day  hydrocortisone sodium succinate PF (SOLU-CORTEF) injection 100 mg, 100 mg, Intravenous, BID **FOLLOWED BY** [START ON 10/11/2020] hydrocortisone sodium succinate PF (SOLU-CORTEF) injection 100 mg, 100 mg, Intravenous, Daily  0.9 % sodium chloride infusion, , Intravenous, Continuous  potassium chloride 20 mEq/50 mL IVPB (Central Line), 40 mEq, Intravenous, Once  promethazine (PHENERGAN) tablet 12.5 mg, 12.5 mg, Oral, Q6H PRN **OR** ondansetron (ZOFRAN) injection 4 mg, 4 mg, Intravenous, Q6H PRN  pantoprazole (PROTONIX) injection 40 mg, 40 mg, Intravenous, Daily **AND** sodium chloride (PF) 0.9 % injection 10 mL, 10 mL, Intravenous, Daily  piperacillin-tazobactam (ZOSYN) 2.25 g in dextrose 5 % 50 mL IVPB (mini-bag), 2.25 g, Intravenous, Q8H **AND** Pharmacy consult for renal dosing, , , 1 Time  vasopressin 20 Units in dextrose 5 % 100 mL infusion, 0.04 Units/min, Intravenous, Continuous  norepinephrine (LEVOPHED) 16 mg in dextrose 5 % 250 mL infusion, 2 mcg/min, Intravenous, Continuous  ondansetron (ZOFRAN) injection 4 mg, 4 mg, Intravenous, Q6H PRN  dicyclomine (BENTYL) capsule 10 mg, 10 mg, Oral, 4x Daily PRN  [Held by provider] digoxin (LANOXIN) tablet 125 mcg, 125 mcg, Oral, Daily  [Held by provider] PARoxetine (PAXIL) tablet 40 mg, 40 mg, Oral, QAM  rosuvastatin (CRESTOR) tablet 40 mg, 40 mg, Oral, QPM  sodium chloride flush 0.9 % injection 10 mL, 10 mL, Intravenous, 2 times per day  sodium chloride flush 0.9 % injection 10 mL, 10 mL, Intravenous, PRN  acetaminophen (TYLENOL) tablet 650 mg, 650 mg, Oral, Q6H PRN **OR** acetaminophen (TYLENOL) suppository 650 mg, 650 mg, Rectal, Q6H PRN  glucose (GLUTOSE) 40 % oral gel 15 g, 15 g, Oral, PRN  dextrose 50 % IV solution, 12.5 g, Intravenous, PRN  glucagon (rDNA) injection 1 mg, 1 mg, Intramuscular, PRN  dextrose 5 % solution, 100 mL/hr, Intravenous, PRN  iopamidol (ISOVUE-300) 61 % injection 100 mL, 100 mL, Intravenous, ONCE PRN  Physical    VITALS:  BP (!) 121/57   Pulse 99   Temp 98.2 °F (36.8 °C) (Oral)   Resp 22   Ht 4' 11\" (1.499 m)   Wt 161 lb 6 oz (73.2 kg)   LMP  (LMP Unknown)   SpO2 100%   BMI 32.59 kg/m²   Constitutional: awake and alert. Oriented x3.  Lying in bed comfortably  Head: Normocephalic, atraumatic  Eyes: EOMI, PERRLA  ENT: moist mucous membranes, nasal cannula in place  Neck: neck supple, trachea midline  Lungs: no wheeze, no crackles  Heart: RRR, normal S1 and S2  GI: Soft, non-distended, +BS  MSK: no edema noted  Skin: warm, dry     Data    CBC:   Lab Results   Component Value Date    WBC 13.7 10/10/2020    RBC 3.04 10/10/2020    HGB 7.0 10/10/2020    HCT 22.3 10/10/2020    MCV 73.4 10/10/2020    MCH 22.9 10/10/2020    MCHC 31.2 10/10/2020    RDW 24.2 10/10/2020     10/10/2020    MPV 8.8 08/01/2015     CMP:    Lab Results   Component Value Date     10/10/2020    K 3.5 10/10/2020    CL 94 10/10/2020    CO2 29 10/10/2020    BUN 37 10/10/2020    CREATININE 3.58 10/10/2020    GFRAA 15.0 10/10/2020    LABGLOM 12.4 10/10/2020    GLUCOSE 259 10/10/2020    PROT 7.5 10/05/2020    LABALBU 4.0 10/05/2020    CALCIUM 6.3 10/10/2020    BILITOT <0.2 10/05/2020    ALKPHOS 134 10/05/2020    AST 37 10/05/2020    ALT 22 10/05/2020       ASSESSMENT AND PLAN      # Acute hypoxic respiratory failure sp cardiac arrest with associated metabolic encephalopathy  - encephalopathy resolved - pt awake alert and oriented  - Code blue 10/7. ROSC obtained after about 20 minutes. Coded again and ROSC obtained again about 10 minutes later  - etiology unclear at this time - likely 2/2 acute cardiac event likely exacerbated by sepsis  - pt was on DVT ppx, so PE less likely  - noted to be hyperkalemic, now is hypokalemic  - Extubated 10/9. Pulm consulted  - cardiology consulted    # Sinus bradycardia- resolved  - cardiology   - echocardiogram  - transvenous pacer placed  - hold coreg and verapamil    # Shock, likely cardiogenic- resolved  - off pressors  - echo  - IVF  - cont abx. Less likely septic shock as patient was HD stable before code blue    # Sepsis 2/2 UTI with history of ESBL E. Coli  - pt with urinary symptoms for last week and UA positive for UTI  - started on Rocephin. Changed to Cipro after looking at sensitivities of last ESBL UTI in 2019  - UCx with pansensitive E Coli. Pt now on Vanc/zosyn (day 6 of abx)  - leukocytosis improved today  - will continue to monitor BCx    # SAÚL on CKD3/4- worsening  - baseline Cr 1.7. Cr 3.58 today  - likely ATN initially 2/2 UTI and diuretic and now exacerbated by cardiac arrest and shock. Holding diuretic and gentle IVF  - caution not to volume overload in setting of CHF  - will monitor renal function and replace electrolytes as needed. Holding replacement while rewarming protocol in place  - nephrology consulted    # Anemia  - history of iron def anemia with acute drop in the setting of worsening renal function  - no bleeding noted  - baseline Hgb 8-9.  Today 7. 1U blood ordered    #  Acute on chronic diastolic CHF  - exacerbated by cardiac arrest  - appears volume depleted at this time- pressors and IVF per cardiology  - holding diuretic and gently IVF    # HTN, HLD, depression, DM- hyperglycemia improved   - off insulin drip and BG more controlled today  - hyperglycemia was likely 2/2 D5 drips which were changed to non-dextrose solutions and glucose now improved  - will add NPO sliding scale  - monitor potassium with insulin administration  - holding BP meds while hypotensive    DVT: Lovenox    Disposition: Pt with hx of ESBL UTI presented with UTI on cipro. Code blue 10/7 AM. ROSC obtained. Pt intubated and sedated and moved to the ICU on pressors. Extubated 10/9. Off pressors. SLP for swallow eval and will start diet. Transfusing 1U blood today. Pulm and cardio consulted.     Maria E Anand, DO  Internal Medicine

## 2020-10-10 NOTE — PROGRESS NOTES
Chief Complaint   Patient presents with    Abdominal Pain     Patient is a 67 y.o. female who presents with a chief complaint of weakness. . Patient is followed on a regular basis by Dr. Leatha Islas MD. Apparently patient had been expressing fevers and chills as well as weakness and presented to the emergency department, also complained of dysuria. Patient with history of diastolic congestive heart failure, chronic kidney disease, anemia, history of hypertrophic obstructive cardiomyopathy, gastric AVM. Status post cardiac catheterization in October 2019 that showed normal coronary arteries. Patient was admitted and being treated for urinary tract infection. Apparently she was noted to be hypotensive and bradycardic this morning and CODE BLUE was called. CPR was initiated and patient was given multiple rounds of epinephrine as well as atropine and CPR was initiated for approximate 20 minutes and ROSC was achieved. I was contacted by intensivist to evaluate patient for hypotension as well as bradycardia. Patient was thought to have an acute cardiac event. She was bradycardic in the 30s to 40s with blood pressure in the 70s to 80s on 2 pressors. 10/8/2020[de-identified] Patient is Arctic sun protocol still. She is intubated and sedated. She is currently off of IV pressors and her potassium has been corrected. Transvenous pacemaker in place and set at 45 bpm, currently she is in sinus bradycardia on telemetry heart rate in the 40s to 50s. Creatinine is 3.17. Potassium is 3.1. WBC is down to 11.     10/9/2020: Remains on Arctic sun and intubated and sedated. She is independent of transvenous pacemaker heart rate is currently normal sinus rhythm in the 80s. Potassium is actually low and is being corrected. She is not on any pressors, blood pressure stable. 10/10/2020: Patient is extubated and very alert today. Family is at the bedside. She denies any chest pain or shortness of breath.   She is audibly wheezing. She is in normal sinus rhythm on telemetry with heart rate into the 80s or 90s for. Not dependent on transvenous pacer. Transvenous pacer pulled at bedside.       Past Medical History        Past Medical History:   Diagnosis Date    Anxiety     Asthma     dx 2019 / has smoked since age 14    CH[facility]congestive heart failure) ([facility])     Chronic back pain     Bilateral L5 S1 Radic on emg--surprisingly worse on the left than the right--pt's symptoms and her MRI show worse on the right    Chronic obstructive pulmonary disease with acute exacerbation ([facility] 10/12/2019    Depression     Fibromyalgia     Hyperlipidemia     meds > 8 yrs    Hypertension     meds > 45 yrs    On home O2     2l per n/c at bedtime mostly,     Osteoarthritis     Type II diabetes mellitus, uncontrolled ([facility])     hx > 8 yrs    Unspecified sleep apnea          Patient Active Problem List   Diagnosis    Hypertension    Hyperlipidemia    Uncontrolled type 2 diabetes mellitus with complication, without long-term current use of insulin ([facility])    Fibromyalgia    Anxiety    Smoker    Insomnia    DJD (degenerative joint disease), lumbar    Lumbosacral radiculopathy at S1    DDD (degenerative disc disease), lumbar    Dysphonia    CTS (carpal tunnel syndrome)    High risk medication use-Norco - 12/20/17 OARRS PM&R, 02/20/18 OARRS PM&R, 03/07/18 OARRS PM&R, Urine Drug screen negative 02/06/17 PM&R--MED CONTRACT 2/6/17    SOB (shortness of breath) on exertion    Chest pain    Memory deficit    Artificial lens present    Presbyopia    Astigmatism, regular    Cataract, nuclear sclerotic senile    IDDM (insulin dependent diabetes mellitus)    Regular astigmatism    Nuclear senile cataract    Neck pain    Lumbosacral radiculopathy at L5    Spinal stenosis of lumbar region with neurogenic claudication    Impaired mobility and activities of daily living    Non-compliant patient NO showed FU 1/10/17 Dr Naina Hardin Insomnia secondary to chronic pain    Reactive depression    Diabetic radiculopathy (HCC)    Cervical radicular pain    Diabetic asymmetric polyneuropathy (HCC)    Myalgia    Intercostal neuropathy    Osteoarthritis of spine with radiculopathy, lumbar region    Acute combined systolic and diastolic CHF, NYHA class 1 (HCC)    PMB (postmenopausal bleeding)    Acute on chronic diastolic heart failure (HCC)    CHF (congestive heart failure) (HCC)    Hypertensive urgency    Uncontrolled type 2 diabetes mellitus with hyperglycemia (HCC)    Chronic obstructive pulmonary disease with acute exacerbation (HCC)    Acute on chronic diastolic (congestive) heart failure ([address]    SAÚL (acute kidney injury) ([address]    Hypoglycemia    HOCM (hypertrophic obstructive cardiomyopathy) (HCC)    Gastrointestinal hemorrhage    COPD exacerbation (HCC)    Anemia    AVM (arteriovenous malformation)    Symptomatic anemia    Flash pulmonary edema (HCC)    Acute on chronic kidney failure (HCC)    Dysarthria    Chronic fatigue    Acute encephalopathy    Adenomatous polyp of sigmoid colon    Adenomatous polyp of transverse colon    Sepsis (HCC)    Major depressive disorder in remission ([address]    Gastroesophageal reflux disease without esophagitis    Acute cystitis without hematuria    CKD (chronic kidney disease) stage 4, GFR 15-29 ml/min ([facility])     Past Surgical History         Past Surgical History:   Procedure Laterality Date    BACK SURGERY  2017    lumbar disc    CARDIAC CATHETERIZATION  11/3/14     DR. MIRELES / no stents    COLONOSCOPY  08/29/2016    w/polypectomy     COLONOSCOPY N/A 9/29/2020    COLONOSCOPY WITH POLYPECTOMY performed by Fercho Gonsalez MD at [facility] N/A 10/7/2019    EUA HYSTEROSCOPY DILATATION AND CURETTAGE performed by Theresa Mina DO at  [address], COLON, DIAGNOSTIC      EYE SURGERY      [place] with IOL OU / [address] HERNIA REPAIR  [address]209    umbilical hernia repair    [place] ESOPHAGOGASTRODUODENOSCOPY TRANSORAL DIAGNOSTIC N/A 3/24/2017    EGD ESOPHAGOGASTRODUODENOSCOPY performed by Tee Rae MD at [facility] N/A 2/8/2018    negative findings    [place] REVISE MEDIAN N/CARPAL TUNNEL SURG Left 6/5/2017    LEFT CARPAL TUNNEL RELEASE [facility]ida Montes [facility] N[facility]A 2/8/2018    TONSILLECTOMY      as child    UPPER GASTROINTESTINAL ENDOSCOPY  08/26/2016    w/bx     UPPER GASTROINTESTINAL ENDOSCOPY N/A 2/25/2020    EGD possible biopsy performed by Trini Bustamante MD at [facility] N/A 7/1/2020    EGD PUSH ENTEROSCOPY performed by Jose Miguel Kelsey MD at [address] [place] [address] Way History         Socioeconomic History    Marital status:      Spouse name: None    Number of children: None    Years of education: None    Highest education level: None   Occupational History    Occupation: Retired-   Social Needs    Financial resource strain: None    Food insecurity     Worry: None     Inability: None    Transportation needs     Medical: None     Non-medical: None   Tobacco Use    Smoking status: Former Smoker     Packs/day: 1.00     Years: 59.00     Pack years: 59.00     Types: Cigarettes     Start date: 11/30/2017    Smokeless tobacco: Never Used    Tobacco comment: quit 2-3 weeks ago    Substance and Sexual Activity    Alcohol use: Not Currently    Drug use: No    Sexual activity: Yes   Lifestyle    Physical activity     Days per week: None     Minutes per session: None    Stress: None   Relationships    Social connections     Talks on phone: None     Gets together: None     Attends Anabaptist service: None     Active member of club or organization: None     Attends meetings of clubs or organizations: None     Relationship status: None    Intimate partner violence     Fear of current or ex partner: None     Emotionally abused: None     Physically abused: None     Forced sexual activity: None   Other Topics Concern    None   Social History Narrative    None     Family History         Family History   Problem Relation Age of Onset    Heart Disease Father     cardiac bypass    Arthritis Father     Arthritis Mother     Other Mother      at age 80    Other Sister     [facility]    No Known Problems Daughter     Stroke Son      Current Facility-Administered Medications             Current Facility-Administered Medications   Medication Dose Route Frequency Provider Last Rate Last Dose    0.9 % sodium chloride bolus 500 mL Intravenous Once Leola Boo DO      propofol 1000 MG/100ML injection          propofol injection 10 mcg/kg/min Intravenous Titrated Alena Jones MD      fentaNYL (SUBLIMAZE) 1,000 mcg in sodium chloride 0.9 % 100 mL infusion 25 mcg/hr Intravenous Continuous Alena Jones MD      sodium bicarbonate 150 mEq in dextrose 5 % 1,000 mL infusion  Intravenous Continuous Alena Jones  mL/hr at 10/07/20 0950     0.9 % sodium chloride infusion  Intravenous Once Alena Jones MD      0.9 % sodium chloride infusion  Intravenous Once Alena Jones MD      promethazine (PHENERGAN) tablet 12.5 mg 12.5 mg Oral Q6H PRN Alena Jones MD      Or    ondansetron (ZOFRAN) injection 4 mg 4 mg Intravenous Q6H PRN Alena Jones MD      chlorhexidine (PERIDEX) 0.12 % solution 15 mL 15 mL Mouth/Throat BID Alena Jones MD      DOPamine (INTROPIN) 400 mg in dextrose 5 % 250 mL infusion 5 mcg/kg/min Intravenous Continuous Alena Jones MD 11.2 mL/hr at 10/07/20 0951 5 mcg/kg/min at 10/07/20 0951    EPINEPHrine 1 MG/10ML injection          EPINEPHrine 1 MG/10ML injection          EPINEPHrine 1 MG/10ML injection          ondansetron (ZOFRAN) injection 4 mg 4 mg Intravenous Q6H PRN Ramsey Tatiana, DO  4 mg at 68/52/72 1437    carvedilol (COREG) tablet 50 mg 50 mg Oral BID Ronobir Tatiana, DO  50 mg at 52/33/99 2141    dicyclomine (BENTYL) capsule 10 mg 10 mg Oral 4x Daily PRN VAISHALIRMaribell Pereyra[employer] Inc, DO      digoxin (LANOXIN) tablet 125 mcg 125 mcg Oral Daily Kendallobir Tatiana, DO  352 mcg at 10/06/20 8266    hydrALAZINE (APRESOLINE) tablet 100 mg 100 mg Oral BID Ronobir Tatiana, DO  812 mg at 83/51/03 2141    insulin glargine (LANTUS) injection vial 60 Units 60 Units Subcutaneous Nightly Raulr Tatiana, DO      insulin lispro (HUMALOG) injection vial 20 Units 20 Units Subcutaneous TID  Ramsey Simpson, DO  20 Units at 10/06/20 1635    pantoprazole (PROTONIX) tablet 40 mg 40 mg Oral QAM AC Kendallobir Tatiana, DO  40 mg at 49/50/57 0629    PARoxetine (PAXIL) tablet 40 mg 40 mg Oral QAM Ronobir Tatiana, DO  40 mg at 56/48/44 0828    rosuvastatin (CRESTOR) tablet 40 mg 40 mg Oral QPM Ronobir Tatiana, DO  40 mg at 62/17/79 1635    verapamil (CALAN SR) extended release tablet 120 mg 120 mg Oral Nightly Ronobir Tatiana, DO  213 mg at 68/00/93 2140    sodium chloride flush 0.9 % injection 10 mL 10 mL Intravenous 2 times per day VAISHALIRMaribell Pereyra[employer], DO      sodium chloride flush 0.9 % injection 10 mL 10 mL Intravenous PRN Ramsey Simpson, DO      acetaminophen (TYLENOL) tablet 650 mg 650 mg Oral B2O PRN Kendallobifred CostaTatiana, DO      Or    acetaminophen (TYLENOL) suppository 650 mg 650 mg Rectal N4G PRN Ramsey Simpson, DO      enoxaparin (LOVENOX) injection 30 mg 30 mg Subcutaneous Daily Ronobir Tatiana, DO  30 mg at 24/31/79 0829    insulin lispro (HUMALOG) injection vial 0-6 Units 0-6 Units Subcutaneous TID  Ramsey Simpson, DO  2 Units at 10/06/20 1214    insulin lispro (HUMALOG) injection vial 0-3 Units 0-3 Units Subcutaneous Nightly Ramsey Simpson,       glucose (GLUTOSE) 40 % oral gel 15 g 15 g Oral PRN Ramsey Simpson,       dextrose 50 % IV solution 12.5 g Intravenous PRN Brendan Dewey, DO  glucagon (rDNA) injection 1 mg 1 mg Intramuscular PRN Ronobir Tatiana, DO      dextrose 5 % solution 100 mL/hr Intravenous PRN Ronobir Tatiana, DO      0.9 % sodium chloride infusion  Intravenous Continuous Ronobir Tatiana, DO 75 mL/hr at 10/06/20 1951     ciprofloxacin (CIPRO) IVPB 400 mg 400 mg Intravenous Q24H [facility] DO  Stopped at 10/06/20 1148    iopamidol (ISOVUE-300) 61 % injection 100 mL 100 mL Intravenous ONCE PRN DIANE Pereira     ALLERGIES: Ibuprofen; Metformin and related; and Darvon [propoxyphene hcl]   Review of Systems   Unable to perform ROS: Intubated     VITALS:   Blood pressure (!) 147/57, pulse 55, temperature 97.7 °F (36.5 °C), resp. rate 18, height 4' 11\" (1.499 m), weight 131 lb 2.8 oz (59.5 kg), SpO2 100 %, not currently breastfeeding. Body mass index is 26.49 kg/m². Physical Exam   HENT:   Head: Normocephalic and atraumatic. Neck: Neck supple. No JVD present. No tracheal deviation present. No thyromegaly present. Cardiovascular: Normal rate, regular rhythm, normal heart sounds and intact distal pulses. PMI is not displaced. Exam reveals no gallop, no S3, no distant heart sounds and no friction rub. No murmur heard. Pulmonary/Chest: No respiratory distress. She has no wheezes. She has no rales. She exhibits no tenderness. Abdominal: Soft. Bowel sounds are normal. She exhibits no distension and no mass. There is no abdominal tenderness. There is no rebound and no guarding. Musculoskeletal:   General: No edema. Neurological: No cranial nerve deficit. Skin: Skin is warm and dry. No rash noted. She is not diaphoretic. No erythema. No pallor.      LABS:   Recent Results         Recent Results (from the past 24 hour(s))   POCT Glucose    Collection Time: 10/06/20 11:02 AM   Result Value Ref Range    POC Glucose 216 (H) 60 - 115 mg/dl    Performed on ACCU-CHEK    POCT Glucose    Collection Time: 10/06/20 4:27 PM   Result Value Ref Range    POC Glucose 133 (H) 60 - 115 mg/dl    Performed on ACCU-CHEK    POCT Glucose    Collection Time: 10/06/20 8:33 PM   Result Value Ref Range    POC Glucose 81 60 - 115 mg/dl    Performed on ACCU-CHEK    POCT Glucose    Collection Time: 10/06/20 9:24 PM   Result Value Ref Range    POC Glucose 114 60 - 115 mg/dl    Performed on ACCU-CHEK    POCT Glucose    Collection Time: 10/07/20 6:33 AM   Result Value Ref Range    POC Glucose 221 (H) 60 - 115 mg/dl    Performed on ACCU-CHEK    CBC    Collection Time: 10/07/20 6:56 AM   Result Value Ref Range    WBC 17.7 (H) 4.8 - 10.8 K/uL    RBC 3.89 (L) 4.20 - 5.40 M/uL    Hemoglobin 9.1 (L) 12.0 - 16.0 g/dL    Hematocrit 29.9 (L) 37.0 - 47.0 %    MCV 76.8 (L) 82.0 - 100.0 fL    MCH 23.4 (L) 27.0 - 31.3 pg    MCHC 30.4 (L) 33.0 - 37.0 %    RDW 24.9 (H) 11.5 - 14.5 %    Platelets 196 943 - 066 K/uL   Basic Metabolic Panel w/ Reflex to MG    Collection Time: 10/07/20 6:56 AM   Result Value Ref Range    Sodium 135 135 - 144 mEq/L    Potassium reflex Magnesium 5.6 (H) 3.4 - 4.9 mEq/L    Chloride 100 95 - 107 mEq/L    CO2 22 20 - 31 mEq/L    Anion Gap 13 9 - 15 mEq/L    Glucose 231 (H) 70 - 99 mg/dL    BUN 44 (H) 8 - 23 mg/dL    CREATININE 3.45 (H) 0.50 - 0.90 mg/dL    GFR Non-African American 12.9 (L) >60    GFR  15.6 (L) >60    Calcium 8.6 8.5 - 9.9 mg/dL   POCT Glucose    Collection Time: 10/07/20 8:05 AM   Result Value Ref Range    POC Glucose 274 (H) 60 - 115 mg/dl    Performed on ACCU-CHEK    POCT Arterial    Collection Time: 10/07/20 9:16 AM   Result Value Ref Range    POC Sodium 139 136 - 145 mEq/L    POC Potassium 5.8 (H) 3.5 - 5.1 mEq/L    POC Chloride 101 99 - 110 mEq/L    POC Glucose 385 (H) 60 - 115 mg/dl    POC Creatinine 3.4 (H) 0.6 - 1.2 mg/dL    GFR Non-African American 13 (A) >60    GFR  16 (A) >60    Calcium, Ion 1.04 (L) 1.12 - 1.32 mmol/L    pH, Arterial 7.246 (L) 7.350 - 7.450    pCO2, Arterial 49 (H) 35 - 45 mm Hg    pO2, Arterial 516 (HH) 75 - 108 mm Hg    HCO3, Arterial 21.3 21.0 - 29.0 mmol/L    Base Excess, Arterial -6 (L) -3 - 3    O2 Sat, Arterial 100 (HH) 93 - 100 %    TCO2, Arterial 23 22 - 29    Lactate 9.14 (HH) 0.40 - 2.00 mmol/L    POC Hematocrit 27 (L) 36 - 48 %    Hemoglobin 9.0 (L) 12.0 - 16.0 gm/dL    FIO2 100.000     Sample Type ART     Performed on SEE BELOW    Troponin:         Lab Results   Component Value Date    TROPONINI 0.041 10/05/2020     EKG: normal sinus rhythm, lvh, LATERAL ST DEPRESSION       ASSESSMENT:     Status post cardiopulmonary arrest questionable secondary to septic shock S  Urosepsis/septic shock   Sinus bradycardia/sick sinus syndrome status post temporary transvenous pacemaker secondary to hyperkalemia versus other- resolved. History of hypertrophic obstructive cardiomyopathy   History of moderate mid LAD CAD. Vasospasm noted and improved with nitro. Chronic kidney disease   History of anemia   Normal LV function   Hx of HTN   HLP   Hyperkalemia      PLAN:   1. As always, aggressive risk factor modification is strongly recommended. We should adhere to the JNC VIII guidelines for HTN management and the NCEPATP III guidelines for LDL-C management. 2. Discontinue temporary pacemaker today  3. Resume Coreg and verapamil as tolerated. Patient was hypotensive and heart rate is okay. 4. ICU SUPPORTIVE CARE AND MANAGEMENT  5. Avoid any nephrotoxic agents. 6. GI/DVT PROPH    Thank you for allowing me to participate in the care of your patient, please don't hesitate to contact me if you have any further questions.        Electronically signed by Jatin Hammond DO on 10/10/2020 at 12:08 PM

## 2020-10-10 NOTE — PROGRESS NOTES
Comprehensive Nutrition Assessment    Type and Reason for Visit:  Reassess    Nutrition Recommendations/Plan: Modify Current Diet(Carb 3 Low Na+ diet/ provide diabetic supplement tid)    Nutrition Assessment:  Pt remains at nutritional risk s/p extubation, with decreased appetite/intake. To monitor adequacy of intake/provide diabetic supplement tid. Malnutrition Assessment:  Malnutrition Status: At risk for malnutrition (Comment)    Context:  Acute Illness     Findings of the 6 clinical characteristics of malnutrition:  Energy Intake:  Unable to assess  Weight Loss:  No significant weight loss     Body Fat Loss:  No significant body fat loss     Muscle Mass Loss:  No significant muscle mass loss    Fluid Accumulation:  Unable to assess     Strength:  Not Performed    Estimated Daily Nutrient Needs:  Energy (kcal):  4328-8268 (kg x 20-22); Weight Used for Energy Requirements:  Usual     Protein (g):  53-66 gm (kg IBW x 1.2-1.5); Weight Used for Protein Requirements:  Ideal        Fluid (ml/day):  8831-5475 (1 ml/kcal); Weight Used for Fluid Requirements:  Current      Nutrition Related Findings:  PMH- DB, COPD, CHF. Pt previously on vent 10/-10/9pm. +1 Gen edema noted. Pt's dtr stated decreased intake pta x several months. Pt stated appetite decreased currently/agreeable to supplement. Wounds:  None       Current Nutrition Therapies:    DIET CARB CONTROL; Carb Control: 4 carb choices (60 gms)/meal  Dietary Nutrition Supplements: None    Anthropometric Measures:  · Height: 4' 11\" (149.9 cm)  · Current Body Weight: 161 lb 10 oz (73.3 kg)(10/10, Gen edema present.)   · Admission Body Weight: 134 lb (60.8 kg)(stated)    · [facility] 133 [facility]60.3 kg[facility]     · Ideal Body Weight: 95 lbs; % Ideal Body Weight   >100%  · BMI: 32.6  · BMI Categories: Obese Class 1 (BMI 30.0-34. 9)       Nutrition Diagnosis:   · Inadequate oral intake related to (current condition, s/p extubation) as evidenced by intake 0-25%(prev NPO)    Nutrition Interventions:   Food and/or Nutrient Delivery:  Modify Current Diet(Carb 3 Low Na+ diet/ provide diabetic supplement tid)  Nutrition Education/Counseling:  No recommendation at this time   Coordination of Nutrition Care:  Continued Inpatient Monitoring    Goals:  New goal with po diet initiated. PO intake >50% of meals/supplement. Nutrition Monitoring and Evaluation:   Food/Nutrient Intake Outcomes:  Intake at meals/supplement intake  Physical Signs/Symptoms Outcomes:  Weight, Biochemical Data, Fluid Status or Edema     Discharge Planning:     Too soon to determine     Electronically signed by Pepe Salazar RD, LD on 10/10/20 at 11:13 AM EDT

## 2020-10-11 LAB
ANION GAP SERPL CALCULATED.3IONS-SCNC: 18 MEQ/L (ref 9–15)
BLOOD CULTURE, ROUTINE: NORMAL
BUN BLDV-MCNC: 47 MG/DL (ref 8–23)
CALCIUM SERPL-MCNC: 6.3 MG/DL (ref 8.5–9.9)
CHLORIDE BLD-SCNC: 97 MEQ/L (ref 95–107)
CO2: 26 MEQ/L (ref 20–31)
CREAT SERPL-MCNC: 4.01 MG/DL (ref 0.5–0.9)
CULTURE, BLOOD 2: NORMAL
GFR AFRICAN AMERICAN: 13.1
GFR NON-AFRICAN AMERICAN: 10.8
GLUCOSE BLD-MCNC: 274 MG/DL (ref 70–99)
GLUCOSE BLD-MCNC: 306 MG/DL (ref 60–115)
GLUCOSE BLD-MCNC: 360 MG/DL (ref 60–115)
GLUCOSE BLD-MCNC: 380 MG/DL (ref 60–115)
GLUCOSE BLD-MCNC: 441 MG/DL (ref 60–115)
HCT VFR BLD CALC: 26 % (ref 37–47)
HEMOGLOBIN: 8.3 G/DL (ref 12–16)
MAGNESIUM: 2.1 MG/DL (ref 1.7–2.4)
MCH RBC QN AUTO: 24.6 PG (ref 27–31.3)
MCHC RBC AUTO-ENTMCNC: 32 % (ref 33–37)
MCV RBC AUTO: 76.8 FL (ref 82–100)
PDW BLD-RTO: 23.4 % (ref 11.5–14.5)
PERFORMED ON: ABNORMAL
PLATELET # BLD: 160 K/UL (ref 130–400)
POTASSIUM REFLEX MAGNESIUM: 3.4 MEQ/L (ref 3.4–4.9)
RBC # BLD: 3.39 M/UL (ref 4.2–5.4)
SODIUM BLD-SCNC: 141 MEQ/L (ref 135–144)
WBC # BLD: 12.6 K/UL (ref 4.8–10.8)

## 2020-10-11 PROCEDURE — 6370000000 HC RX 637 (ALT 250 FOR IP): Performed by: INTERNAL MEDICINE

## 2020-10-11 PROCEDURE — 6360000002 HC RX W HCPCS: Performed by: INTERNAL MEDICINE

## 2020-10-11 PROCEDURE — 2000000000 HC ICU R&B

## 2020-10-11 PROCEDURE — 85027 COMPLETE CBC AUTOMATED: CPT

## 2020-10-11 PROCEDURE — 83735 ASSAY OF MAGNESIUM: CPT

## 2020-10-11 PROCEDURE — 2580000003 HC RX 258: Performed by: INTERNAL MEDICINE

## 2020-10-11 PROCEDURE — 2700000000 HC OXYGEN THERAPY PER DAY

## 2020-10-11 PROCEDURE — C9113 INJ PANTOPRAZOLE SODIUM, VIA: HCPCS | Performed by: INTERNAL MEDICINE

## 2020-10-11 PROCEDURE — 99233 SBSQ HOSP IP/OBS HIGH 50: CPT | Performed by: INTERNAL MEDICINE

## 2020-10-11 PROCEDURE — 2500000003 HC RX 250 WO HCPCS: Performed by: INTERNAL MEDICINE

## 2020-10-11 PROCEDURE — 97162 PT EVAL MOD COMPLEX 30 MIN: CPT

## 2020-10-11 PROCEDURE — 99232 SBSQ HOSP IP/OBS MODERATE 35: CPT | Performed by: INTERNAL MEDICINE

## 2020-10-11 PROCEDURE — 97166 OT EVAL MOD COMPLEX 45 MIN: CPT

## 2020-10-11 PROCEDURE — 80048 BASIC METABOLIC PNL TOTAL CA: CPT

## 2020-10-11 RX ADMIN — Medication 5 ML: at 12:05

## 2020-10-11 RX ADMIN — Medication 10 ML: at 08:39

## 2020-10-11 RX ADMIN — INSULIN LISPRO 3 UNITS: 100 INJECTION, SOLUTION INTRAVENOUS; SUBCUTANEOUS at 20:21

## 2020-10-11 RX ADMIN — Medication 10 ML: at 08:40

## 2020-10-11 RX ADMIN — PANTOPRAZOLE SODIUM 40 MG: 40 INJECTION, POWDER, FOR SOLUTION INTRAVENOUS at 07:57

## 2020-10-11 RX ADMIN — CLONIDINE HYDROCHLORIDE 0.2 MG: 0.1 TABLET ORAL at 16:46

## 2020-10-11 RX ADMIN — INSULIN LISPRO 5 UNITS: 100 INJECTION, SOLUTION INTRAVENOUS; SUBCUTANEOUS at 12:05

## 2020-10-11 RX ADMIN — Medication 10 ML: at 07:57

## 2020-10-11 RX ADMIN — VERAPAMIL HYDROCHLORIDE 120 MG: 240 TABLET, FILM COATED, EXTENDED RELEASE ORAL at 20:19

## 2020-10-11 RX ADMIN — PIPERACILLIN AND TAZOBACTAM 2.25 G: 2; .25 INJECTION, POWDER, LYOPHILIZED, FOR SOLUTION INTRAVENOUS at 03:22

## 2020-10-11 RX ADMIN — PIPERACILLIN AND TAZOBACTAM 2.25 G: 2; .25 INJECTION, POWDER, LYOPHILIZED, FOR SOLUTION INTRAVENOUS at 11:21

## 2020-10-11 RX ADMIN — LABETALOL HYDROCHLORIDE 10 MG: 5 INJECTION, SOLUTION INTRAVENOUS at 18:11

## 2020-10-11 RX ADMIN — HEPARIN SODIUM 5000 UNITS: 5000 INJECTION INTRAVENOUS; SUBCUTANEOUS at 05:18

## 2020-10-11 RX ADMIN — LABETALOL HYDROCHLORIDE 10 MG: 5 INJECTION, SOLUTION INTRAVENOUS at 09:13

## 2020-10-11 RX ADMIN — CLONIDINE HYDROCHLORIDE 0.2 MG: 0.1 TABLET ORAL at 05:18

## 2020-10-11 RX ADMIN — POTASSIUM CHLORIDE 20 MEQ: 29.8 INJECTION, SOLUTION INTRAVENOUS at 09:13

## 2020-10-11 RX ADMIN — CARVEDILOL 6.25 MG: 6.25 TABLET, FILM COATED ORAL at 07:58

## 2020-10-11 RX ADMIN — INSULIN LISPRO 4 UNITS: 100 INJECTION, SOLUTION INTRAVENOUS; SUBCUTANEOUS at 07:58

## 2020-10-11 RX ADMIN — ROSUVASTATIN CALCIUM 40 MG: 40 TABLET, FILM COATED ORAL at 20:19

## 2020-10-11 RX ADMIN — Medication 10 ML: at 20:20

## 2020-10-11 RX ADMIN — HYDROCORTISONE SODIUM SUCCINATE 100 MG: 100 INJECTION, POWDER, FOR SOLUTION INTRAMUSCULAR; INTRAVENOUS at 07:58

## 2020-10-11 RX ADMIN — HEPARIN SODIUM 5000 UNITS: 5000 INJECTION INTRAVENOUS; SUBCUTANEOUS at 20:21

## 2020-10-11 RX ADMIN — POTASSIUM CHLORIDE 20 MEQ: 29.8 INJECTION, SOLUTION INTRAVENOUS at 08:39

## 2020-10-11 RX ADMIN — PIPERACILLIN AND TAZOBACTAM 2.25 G: 2; .25 INJECTION, POWDER, LYOPHILIZED, FOR SOLUTION INTRAVENOUS at 20:19

## 2020-10-11 RX ADMIN — HEPARIN SODIUM 5000 UNITS: 5000 INJECTION INTRAVENOUS; SUBCUTANEOUS at 13:52

## 2020-10-11 RX ADMIN — CARVEDILOL 6.25 MG: 6.25 TABLET, FILM COATED ORAL at 16:46

## 2020-10-11 RX ADMIN — INSULIN LISPRO 6 UNITS: 100 INJECTION, SOLUTION INTRAVENOUS; SUBCUTANEOUS at 16:48

## 2020-10-11 ASSESSMENT — PAIN SCALES - GENERAL
PAINLEVEL_OUTOF10: 0

## 2020-10-11 NOTE — PROGRESS NOTES
CRITICAL CARE PROGRESS NOTES    PATIENT NAME: Chuck Reynaga  MRN: [id number]  SERVICE DATE:  October 11, 2020   SERVICE TIME:  12:34 PM      PRIMARY SERVICE: Critical care medicine    CHIEF COMPLAINTS: Respiratory failure, sepsis    INTERVAL HPI: Patient seen and examined at bedside, Interval Notes, orders reviewed. Discussed on multidisciplinary rounds  Patient continues to improve she is fully alert awake sitting upright in a chair in no distress denies shortness of breath oxygenating well on her cannula she is currently 100%. She is still somewhat hypertensive but afebrile with normal pulse. Her only complaint is burning feeling on her tongue. OBJECTIVE    Body mass index is 32.42 kg/m². PHYSICAL EXAM:  Vitals:  BP (!) 181/66   Pulse 70   Temp 98.1 °F (36.7 °C) (Bladder)   Resp 20   Ht 4' 11\" (1.499 m)   Wt 160 lb 7.9 oz (72.8 kg)   LMP  (LMP Unknown)   SpO2 100%   BMI 32.42 kg/m²   General: Patient is currently alert, awake . comfortable in bed, No distress. Head: Atraumatic , Normocephalic   Eyes: PERRL. No icteric sclera. No conjunctival injection. No discharge   ENT: No nasal  discharge. Pharynx clear. Neck:  Trachea midline. No thyromegaly, no JVD, No cervical adenopathy. Chest : Increased spontaneous breathing effort, symmetric bilateral excursions  Lung : Mildly diminished breath sounds bilaterally, expiratory wheezes noted in the central upper airway region  Heart[de-identified] Regular rhythm and rate. No mumur ,  Rub or gallop  ABD: Benign. Non-tender. Non-distended. No masses or organmegaly. Normal bowel sounds. EXT: Trace pitting edema both lower extremities , No Cyanosis No clubbing  Neuro: no focal weakness  Skin: Warm and dry. No erythema or rash on exposed extremities.       DATA:   Recent Labs     10/10/20  0547 10/10/20  1744 10/11/20  0555   WBC 13.7*  --  12.6*   HGB 7.0* 8.4* 8.3*   HCT 22.3* 26.4* 26.0*   MCV 73.4*  --  76.8*     --  160     Recent Labs     10/10/20  0551 10/11/20  0557    141   K 3.5 3.4   CL 94* 97   CO2 29 26   BUN 37* 47*   CREATININE 3.58* 4.01*   GLUCOSE 259* 274*   CALCIUM 6.3* 6.3*   LABGLOM 12.4* 10.8*   GFRAA 15.0* 13.1*       MV Settings:     Vent Mode: (S) CPAP  Vt Ordered: 280 mL  Rate Set: 12 bmp  FiO2 : 100 %  PEEP/CPAP: 5  Pressure Support: 5 cmH20  Peak Inspiratory Pressure: 12 cmH2O  Mean Airway Pressure: 7.7 cmH20  I:E Ratio: 1:5.20    Recent Labs     10/09/20  1441   PHART 7.500*   UTE3IGI 40   PO2ART 90*   VKG7IOC 30.8*   BEART 8*   X6OSARLQ 98*       O2 Device: Nasal cannula  O2 Flow Rate (L/min): 3 L/min    Dietary Nutrition Supplements: Diabetic Oral Supplement  DIET CARB CONTROL; Carb Control: 3 carb choices (45 gms)/meal; Low Sodium (2 GM)  Dietary Nutrition Supplements: Diabetic Oral Supplement     MEDICATIONS during current hospitalization:    Continuous Infusions:   dextrose         Scheduled Meds:   sodium chloride  20 mL Intravenous Once    carvedilol  6.25 mg Oral BID WC    verapamil  120 mg Oral Nightly    cloNIDine  0.2 mg Oral BID    insulin lispro  0-6 Units Subcutaneous TID WC    insulin lispro  0-3 Units Subcutaneous Nightly    sodium chloride flush  10 mL Intravenous 2 times per day    heparin (porcine)  5,000 Units Subcutaneous 3 times per day    hydrocortisone sodium succinate PF  100 mg Intravenous Daily    potassium chloride  40 mEq Intravenous Once    pantoprazole  40 mg Intravenous Daily    And    sodium chloride (PF)  10 mL Intravenous Daily    piperacillin-tazobactam  2.25 g Intravenous Q8H    [Held by provider] digoxin  125 mcg Oral Daily    [Held by provider] PARoxetine  40 mg Oral QAM    rosuvastatin  40 mg Oral QPM    sodium chloride flush  10 mL Intravenous 2 times per day       PRN Meds:magic (miracle) mouthwash, labetalol, potassium chloride, sodium chloride flush, promethazine **OR** ondansetron, ondansetron, dicyclomine, sodium chloride flush, acetaminophen **OR** acetaminophen, glucose, dextrose, glucagon (rDNA), dextrose, iopamidol    Radiology  X-rays were all reviewed since admission            IMPRESSION AND SUGGESTION:  1. Status post cardiac arrest, recovered fully  2. Encephalopathy, resolved patient is fully alert and responds appropriately today  3. Acute on chronic hypoxic and hypercapnic respiratory failure on presentation with arrest improved extubated and has done well  4. Septic shock which is related to urinary tract infection with E. Coli, resolved  5. Hypertension, diuresis tolerated, PRN labetalol and monitor closely, oral hypertensive treatment per cardiology  6.  Acute kidney injury, on top of chronic kidney disease, mild worsening of kidney functions but nonoliguric  Patient remained stable, with initiate physical therapy, increase activity as tolerated, transfer to the floor today    Electronically signed by Slade Villalobos MD, FCCP on 10/11/2020 at 12:34 PM

## 2020-10-11 NOTE — PROGRESS NOTES
Department of Internal Medicine  General Internal Medicine  Attending Progress Note      SUBJECTIVE:  Pt seen and examined. Extubated 10/9. Awake and alert. No complaints.  Pt without complaints today    OBJECTIVE      Medications    Current Facility-Administered Medications: 0.9 % sodium chloride bolus, 20 mL, Intravenous, Once  labetalol (NORMODYNE;TRANDATE) injection 10 mg, 10 mg, Intravenous, Q6H PRN  carvedilol (COREG) tablet 6.25 mg, 6.25 mg, Oral, BID WC  verapamil (CALAN SR) extended release tablet 120 mg, 120 mg, Oral, Nightly  cloNIDine (CATAPRES) tablet 0.2 mg, 0.2 mg, Oral, BID  potassium chloride 20 mEq/50 mL IVPB (Central Line), 20 mEq, Intravenous, PRN  insulin lispro (HUMALOG) injection vial 0-6 Units, 0-6 Units, Subcutaneous, TID WC  insulin lispro (HUMALOG) injection vial 0-3 Units, 0-3 Units, Subcutaneous, Nightly  sodium chloride flush 0.9 % injection 10 mL, 10 mL, Intravenous, 2 times per day  sodium chloride flush 0.9 % injection 10 mL, 10 mL, Intravenous, PRN  heparin (porcine) injection 5,000 Units, 5,000 Units, Subcutaneous, 3 times per day  [COMPLETED] hydrocortisone sodium succinate PF (SOLU-CORTEF) injection 100 mg, 100 mg, Intravenous, BID **FOLLOWED BY** hydrocortisone sodium succinate PF (SOLU-CORTEF) injection 100 mg, 100 mg, Intravenous, Daily  potassium chloride 20 mEq/50 mL IVPB (Central Line), 40 mEq, Intravenous, Once  promethazine (PHENERGAN) tablet 12.5 mg, 12.5 mg, Oral, Q6H PRN **OR** ondansetron (ZOFRAN) injection 4 mg, 4 mg, Intravenous, Q6H PRN  pantoprazole (PROTONIX) injection 40 mg, 40 mg, Intravenous, Daily **AND** sodium chloride (PF) 0.9 % injection 10 mL, 10 mL, Intravenous, Daily  piperacillin-tazobactam (ZOSYN) 2.25 g in dextrose 5 % 50 mL IVPB (mini-bag), 2.25 g, Intravenous, Q8H **AND** Pharmacy consult for renal dosing, , , 1 Time  ondansetron (ZOFRAN) injection 4 mg, 4 mg, Intravenous, Q6H PRN  dicyclomine (BENTYL) capsule 10 mg, 10 mg, Oral, 4x Daily PRN  [Held by provider] digoxin (LANOXIN) tablet 125 mcg, 125 mcg, Oral, Daily  [Held by provider] PARoxetine (PAXIL) tablet 40 mg, 40 mg, Oral, QAM  rosuvastatin (CRESTOR) tablet 40 mg, 40 mg, Oral, QPM  sodium chloride flush 0.9 % injection 10 mL, 10 mL, Intravenous, 2 times per day  sodium chloride flush 0.9 % injection 10 mL, 10 mL, Intravenous, PRN  acetaminophen (TYLENOL) tablet 650 mg, 650 mg, Oral, Q6H PRN **OR** acetaminophen (TYLENOL) suppository 650 mg, 650 mg, Rectal, Q6H PRN  glucose (GLUTOSE) 40 % oral gel 15 g, 15 g, Oral, PRN  dextrose 50 % IV solution, 12.5 g, Intravenous, PRN  glucagon (rDNA) injection 1 mg, 1 mg, Intramuscular, PRN  dextrose 5 % solution, 100 mL/hr, Intravenous, PRN  iopamidol (ISOVUE-300) 61 % injection 100 mL, 100 mL, Intravenous, ONCE PRN  Physical    VITALS:  /73   Pulse 66   Temp 99 °F (37.2 °C) (Oral)   Resp 19   Ht 4' 11\" (1.499 m)   Wt 160 lb 7.9 oz (72.8 kg)   LMP  (LMP Unknown)   SpO2 100%   BMI 32.42 kg/m²   Constitutional: awake and alert. Oriented x3.  Lying in bed comfortably  Head: Normocephalic, atraumatic  Eyes: EOMI, PERRLA  ENT: moist mucous membranes, nasal cannula in place  Neck: neck supple, trachea midline  Lungs: no wheeze, no crackles  Heart: RRR, normal S1 and S2  GI: Soft, non-distended, +BS  MSK: no edema noted  Skin: warm, dry     Data    CBC:   Lab Results   Component Value Date    WBC 12.6 10/11/2020    RBC 3.39 10/11/2020    HGB 8.3 10/11/2020    HCT 26.0 10/11/2020    MCV 76.8 10/11/2020    MCH 24.6 10/11/2020    MCHC 32.0 10/11/2020    RDW 23.4 10/11/2020     10/11/2020    MPV 8.8 08/01/2015     CMP:    Lab Results   Component Value Date     10/11/2020    K 3.4 10/11/2020    CL 97 10/11/2020    CO2 26 10/11/2020    BUN 47 10/11/2020    CREATININE 4.01 10/11/2020    GFRAA 13.1 10/11/2020    LABGLOM 10.8 10/11/2020    GLUCOSE 274 10/11/2020    PROT 7.5 10/05/2020    LABALBU 4.0 10/05/2020    CALCIUM 6.3 10/11/2020 BILITOT <0.2 10/05/2020    ALKPHOS 134 10/05/2020    AST 37 10/05/2020    ALT 22 10/05/2020       ASSESSMENT AND PLAN      # Acute hypoxic respiratory failure sp cardiac arrest with associated metabolic encephalopathy  - encephalopathy resolved - pt awake alert and oriented  - Code blue 10/7. ROSC obtained after about 20 minutes. Coded again and ROSC obtained again about 10 minutes later  - etiology unclear at this time - likely 2/2 acute cardiac event likely exacerbated by sepsis  - pt was on DVT ppx, so PE less likely  - noted to be hyperkalemic, now is hypokalemic  - Extubated 10/9. Pulm consulted  - cardiology consulted    # Sinus bradycardia- resolved  - cardiology   - echocardiogram  - transvenous pacer placed  - hold coreg and verapamil    # Shock, likely cardiogenic- resolved  - off pressors  - echo  - IVF  - cont abx. Less likely septic shock as patient was HD stable before code blue    # Sepsis 2/2 UTI with history of ESBL E. Coli  - pt with urinary symptoms for last week and UA positive for UTI  - started on Rocephin. Changed to Cipro after looking at sensitivities of last ESBL UTI in 2019  - UCx with pansensitive E Coli. Pt now on Vanc/zosyn (day 7 of abx)  - leukocytosis improved today  - will continue to monitor BCx which are negative to date    # SAÚL on CKD3/4- worsening  - baseline Cr 1.7. Cr 4.01 today  - likely ATN initially 2/2 UTI and diuretic and now exacerbated by cardiac arrest and shock. Holding diuretic and gentle IVF  - caution not to volume overload in setting of CHF  - will monitor renal function and replace electrolytes as needed. Holding replacement while rewarming protocol in place  - nephrology consulted    # Anemia  - history of iron def anemia with acute drop in the setting of worsening renal function  - no bleeding noted  - baseline Hgb 8-9.  Today 8.3 sp 1U blood transfused 10/10    #  Acute on chronic diastolic CHF  - exacerbated by cardiac arrest  - appears volume depleted at this time- off pressors and IVF per cardiology  - holding diuretic and gently IVF    # HTN, HLD, depression, DM- hyperglycemia improved   - off insulin drip and BG more controlled today  - hyperglycemia was likely 2/2 D5 drips which were changed to non-dextrose solutions and glucose now improved  - ISS  - monitor potassium with insulin administration  - holding BP meds while hypotensive    DVT: Lovenox    Disposition: Pt with hx of ESBL UTI presented with UTI on cipro. Code blue 10/7 AM. ROSC obtained. Pt intubated and sedated and moved to the ICU on pressors. Extubated 10/9. Off pressors. Renal function continues to decline. Pulm, nephro and cardio consulted.     [facility] DO  Internal Medicine

## 2020-10-11 NOTE — PROGRESS NOTES
[facility] OCCUPATIONAL THERAPY EVALUATION - ACUTE     NAME: Lam Demarco  : 1945 (67 y.o.)  MRN: [id number]  CODE STATUS: Full Code  Room: [place] [address]    Date of Service: 10/11/2020    Patient Diagnosis(es): Sepsis [facility]) [A41.9]   Chief Complaint   Patient presents with    Abdominal Pain     Patient Active Problem List    Diagnosis Date Noted    Cardiopulmonary arrest [facility])      Priority: High    Lumbosacral radiculopathy at L5 2015     Priority: High     Class: Acute    Spinal stenosis of lumbar region with neurogenic claudication 2015     Priority: High     Class: Chronic    High risk medication use-Norco - 17 OARRS PM&R, 18 OARRS PM&R, 18 OARRS PM&R, Urine Drug screen negative 17 PM&R--MED CONTRACT 2014     Priority: High    Sepsis ([address] 10/06/2020    Major depressive disorder in remission ([address] 10/06/2020    Gastroesophageal reflux disease without esophagitis 10/06/2020    Acute cystitis without hematuria 10/06/2020    CKD (chronic kidney disease) stage 4, GFR 15-29 ml/min ([address] 10/06/2020    Adenomatous polyp of sigmoid colon     Adenomatous polyp of transverse colon     Acute encephalopathy     Flash pulmonary edema ([address] 2020    Acute on chronic kidney failure ([address] 2020    Dysarthria     Chronic fatigue     Symptomatic anemia 2020    COPD exacerbation ([address] 2020    Anemia     AVM (arteriovenous malformation)     Gastrointestinal hemorrhage 2020    HOCM (hypertrophic obstructive cardiomyopathy) ([address] 2019    Hypoglycemia     SAÚL (acute kidney injury) ([address] 10/23/2019    Acute on chronic diastolic (congestive) heart failure ([address] 10/13/2019    Chronic obstructive pulmonary disease with acute exacerbation ([address] 10/12/2019    Hypertensive urgency 10/09/2019    Uncontrolled type 2 diabetes mellitus with hyperglycemia ([address]     Acute on chronic diastolic heart failure ([address] 10/08/2019  CHF (congestive heart failure) ([facility])     PMB (postmenopausal bleeding)     Acute combined systolic and diastolic CHF, NYHA class 1 ([facility]) 03/24/2019    Osteoarthritis of spine with radiculopathy, lumbar region 11/07/2018    Myalgia 05/11/2018    Intercostal neuropathy 05/11/2018    Diabetic asymmetric polyneuropathy ([address] 04/11/2017    Cervical radicular pain 12/03/2016    Reactive depression 07/15/2016    Diabetic radiculopathy ([address] 07/15/2016    Insomnia secondary to chronic pain 04/15/2016    Non-compliant patient NO showed FU 1/10/17 Dr Atiya Pa 09/24/2015    Impaired mobility and activities of daily living 03/28/2015    Neck pain 03/04/2015    Artificial lens present 10/03/2014    Presbyopia 10/03/2014    Astigmatism, regular 10/03/2014    Cataract, nuclear sclerotic senile 10/03/2014    IDDM (insulin dependent diabetes mellitus) 10/03/2014    Regular astigmatism 10/03/2014    Nuclear senile cataract 10/03/2014    Memory deficit 06/04/2014    SOB (shortness of breath) on exertion 03/14/2014    Chest pain 03/14/2014    CTS (carpal tunnel syndrome) 02/09/2014    DJD (degenerative joint disease), lumbar 02/08/2014    Lumbosacral radiculopathy at S1 02/08/2014    DDD (degenerative disc disease), lumbar 02/08/2014    Dysphonia 02/08/2014    Insomnia 12/04/2013    Smoker 06/18/2013    Hypertension     Hyperlipidemia     Uncontrolled type 2 diabetes mellitus with complication, without long-term current use of insulin ([facility])     Fibromyalgia     Anxiety         Past Medical History:   Diagnosis Date    Anxiety     Asthma     dx 2019 / has smoked since age 14    CHF (congestive heart failure) ([facility])     Chronic back pain     Bilateral L5 S1 Radic on emg--surprisingly worse on the left than the right--pt's symptoms and her MRI show worse on the right    Chronic obstructive pulmonary disease with acute exacerbation ([address] 10/12/2019    Depression     Fibromyalgia     Hyperlipidemia     meds > 8 yrs    Hypertension     meds > 45 yrs    On home O2     2l per n/c at bedtime mostly,     Osteoarthritis     Type II diabetes mellitus, uncontrolled ([address]     hx > 8 yrs    Unspecified sleep apnea      Past Surgical History:   Procedure Laterality Date    BACK SURGERY  2017    lumbar disc    CARDIAC CATHETERIZATION  11/3/14     DR. MIRELES / no stents    COLONOSCOPY  08/29/2016    w/polypectomy     COLONOSCOPY N/A 9/29/2020    COLONOSCOPY WITH POLYPECTOMY performed by Jane Buchanan MD at [facility] N/A 10/7/2019    EUA HYSTEROSCOPY DILATATION AND CURETTAGE performed by Beatris Mcleod DO at [facility] [place], DIAGNOSTIC      EYE SURGERY      [place] with IOL OU / [address] [place]  [address]    umbilical hernia repair    TX ESOPHAGOGASTRODUODENOSCOPY TRANSORAL DIAGNOSTIC N/A 3/24/2017    EGD ESOPHAGOGASTRODUODENOSCOPY performed by Alena Hines MD at [place] [address] 2/8/2018    negative findings    TX REVISE MEDIAN N/CARPAL TUNNEL SURG Left 6/5/2017    LEFT  CARPAL TUNNEL RELEASE performed by Arsalan Avendaño MD at [facility],7[facility]Y/P,[facility] N/A 2/8/2018    TONSILLECTOMY      as child    UPPER GASTROINTESTINAL ENDOSCOPY  08/26/2016    w/bx     UPPER GASTROINTESTINAL ENDOSCOPY N/A 2/25/2020    EGD possible biopsy performed by [facility]anan MD at [facility] 7/1/2020    EGD PUSH ENTEROSCOPY performed by Dottie Padilla MD at [facility]        Restrictions  Restrictions/Precautions: Fall Risk     Safety Devices: [address] in place: Yes  Type of devices:  All fall risk precautions in place        Subjective  Pre Treatment Pain Screening  Pain at present: 0    Pain Reassessment:   Pain Assessment  Pain Level: 0       Prior Level of Function:  Social/Functional Dominance:  Hand Dominance  Hand Dominance: Right    ADL Status:  ADL  Feeding: Setup  Grooming: Setup(to wash her hands and face)  UE Dressing: Maximum assistance  LE Dressing: Dependent/Total  Toileting: None(Patient has catheter)  Toilet Transfers  Toilet Transfers Comments: guaman in place- no BM needed       Therapy key for assistance levels -   Independent = Pt. is able to perform task with no assistance but may require a device   Stand by assistance = Pt. does not perform task at an independent level but does not need physical assistance, requires verbal cues  Minimal, Moderate, Maximal Assistance = Pt. requires physical assistance (25%, 50%, 75% assist from helper) for task but is able to actively participate in task   Dependent = Pt. requires total assistance with task and is not able to actively participate with task completion     Functional Mobility:  Functional Mobility  Functional Mobility Comments: NT at this time.  Will need to be further assessed  Transfers  Transfer Comments: NT    Bed Mobility  Bed mobility  Comment: Did not assess on eval. Patient was fatigued from sitting in a chair earlier today and then participating in PT eval    Seated and Standing Balance:       Functional Endurance:  Activity Tolerance  Activity Tolerance: Patient limited by fatigue    D/C Recommendations:  OT D/C RECOMMENDATIONS  REQUIRES OT FOLLOW UP: Yes    Equipment Recommendations:  OT Equipment Recommendations  Other: to be determined    OT Education:   OT Education  OT Education: OT Role    OT Follow Up:  OT D/C RECOMMENDATIONS  REQUIRES OT FOLLOW UP: Yes       Assessment/Discharge Disposition:     Performance deficits / Impairments: Decreased strength, Decreased endurance, Decreased high-level IADLs, Decreased ADL status, Decreased ROM, Decreased balance  Prognosis: Good  Discharge Recommendations: Continue to assess pending progress  Decision Making: Medium Complexity  History: moderate chart review  Exam: 7 areas of deficit  Assistance / Modification: moderate modification needed due to patient fatigue    Six Click Score   How much help for putting on and taking off regular lower body clothing?: Total  How much help for Bathing?: A Lot  How much help for Toileting?: Total  How much help for putting on and taking off regular upper body clothing?: A Lot  How much help for taking care of personal grooming?: A Little  How much help for eating meals?: A Little  AM-[facility] Inpatient Daily Activity Raw Score: 12  AM-PAC Inpatient ADL T-Scale Score : 30.6  ADL Inpatient CMS 0-100% Score: 66.57    Plan:  Plan  Times per week: 1-3 times per week  Plan weeks: length of acute stay  Current Treatment Recommendations: Strengthening, Functional Mobility Training, Patient/Caregiver Education & Training, Equipment Evaluation, Education, & procurement, Endurance Training, Self-Care / ADL  Plan Comment: edema management    Goals:   Patient will:    - Improve functional endurance to tolerate/complete 30 mins of ADL's  - Be indep in UB ADLs   - Be min assist in LB ADLs  - Be min assist in ADL transfers without LOB  - Be min assist in toileting tasks  - Improve B UEs UE strength and endurance to good in order to participate in self-care activities as projected. - Access appropriate D/C site with as few architectural barriers as possible.     Patient Goal: Patient goals : Patient would like her body to get better and to [facility] better\"     Discussed and agreed upon: Yes Comments:     Therapy Time:   OT Individual Minutes  Time In: 6211  Time Out: 1500  Minutes: 15    Eval: 15 minutes     Electronically signed by:    MONICA Stratton  10/11/2020, 3:10 PM

## 2020-10-11 NOTE — PROGRESS NOTES
Chief Complaint   Patient presents with    Abdominal Pain     Patient is a 67 y.o. female who presents with a chief complaint of weakness. . Patient is followed on a regular basis by Dr. Shay Win MD. Apparently patient had been expressing fevers and chills as well as weakness and presented to the emergency department, also complained of dysuria. Patient with history of diastolic congestive heart failure, chronic kidney disease, anemia, history of hypertrophic obstructive cardiomyopathy, gastric AVM. Status post cardiac catheterization in October 2019 that showed normal coronary arteries. Patient was admitted and being treated for urinary tract infection. Apparently she was noted to be hypotensive and bradycardic this morning and CODE BLUE was called. CPR was initiated and patient was given multiple rounds of epinephrine as well as atropine and CPR was initiated for approximate 20 minutes and ROSC was achieved. I was contacted by intensivist to evaluate patient for hypotension as well as bradycardia. Patient was thought to have an acute cardiac event. She was bradycardic in the 30s to 40s with blood pressure in the 70s to 80s on 2 pressors. 10/8/2020[de-identified] Patient is Arctic sun protocol still. She is intubated and sedated. She is currently off of IV pressors and her potassium has been corrected. Transvenous pacemaker in place and set at 45 bpm, currently she is in sinus bradycardia on telemetry heart rate in the 40s to 50s. Creatinine is 3.17. Potassium is 3.1. WBC is down to 11.     10/9/2020: Remains on Arctic sun and intubated and sedated. She is independent of transvenous pacemaker heart rate is currently normal sinus rhythm in the 80s. Potassium is actually low and is being corrected. She is not on any pressors, blood pressure stable. 10/10/2020: Patient is extubated and very alert today. Family is at the bedside. She denies any chest pain or shortness of breath.   She is audibly wheezing. She is in normal sinus rhythm on telemetry with heart rate into the 80s or 90s for. Not dependent on transvenous pacer. Transvenous pacer pulled at bedside. 10/11/2020: Patient is resting comfortably. No new events overnight. Normal sinus rhythm on telemetry. Potassium is 3.4. Hemoglobin is 8.3. Renal function worsening with a creatinine of 4.0 now. She is hemodynamically stable.       Past Medical History        Past Medical History:   Diagnosis Date    Anxiety     Asthma     dx 2019 / has smoked since age 14    CHF (congestive heart failure) ([facility])     Chronic back pain     Bilateral L5 S1 Radic on emg--surprisingly worse on the left than the right--pt's symptoms and her MRI show worse on the right    Chronic obstructive pulmonary disease with acute exacerbation ([facility] [address] 10/12/2019    Depression     Fibromyalgia     Hyperlipidemia     meds > 8 yrs    Hypertension     meds > 45 yrs    On home O2     2l per n/c at bedtime mostly,     Osteoarthritis     Type II diabetes mellitus, uncontrolled ([facility])     hx > 8 yrs    Unspecified sleep apnea          Patient Active Problem List   Diagnosis    Hypertension    Hyperlipidemia    Uncontrolled type 2 diabetes mellitus with complication, without long-term current use of insulin ([facility])    Fibromyalgia    Anxiety    Smoker    Insomnia    DJD (degenerative joint disease), lumbar    Lumbosacral radiculopathy at S1    DDD (degenerative disc disease), lumbar    Dysphonia    CTS (carpal tunnel syndrome)    High risk medication use-Norco - 12/20/17 OARRS PM&R, 02/20/18 OARRS PM&R, 03/07/18 OARRS PM&R, Urine Drug screen negative 02/06/17 PM&R--MED CONTRACT 2/6/17    SOB (shortness of breath) on exertion    Chest pain    Memory deficit    Artificial lens present    Presbyopia    Astigmatism, regular    Cataract, nuclear sclerotic senile    IDDM (insulin dependent diabetes mellitus)    Regular astigmatism    Nuclear senile cataract [address] OF UTERUS N/A 10/7/2019    EUA HYSTEROSCOPY DILATATION AND CURETTAGE performed by Yolanda Alvarado DO at [facility]. [address], COLON, DIAGNOSTIC      EYE SURGERY      [place] with IOL OU / [address] [place]  [address]    umbilical hernia repair    KS ESOPHAGOGASTRODUODENOSCOPY TRANSORAL DIAGNOSTIC N/A 3/24/2017    EGD ESOPHAGOGASTRODUODENOSCOPY performed by Tutu Portillo MD at [facility] N/A 2/8/2018    negative findings    KS REVISE MEDIAN N/CARPAL TUNNEL SURG Left 6/5/2017    LEFT CARPAL TUNNEL RELEASE performed by Smita Beal MD at [facility],4+O/S,LGPLO N/A 2/8/2018    TONSILLECTOMY      as child    UPPER GASTROINTESTINAL ENDOSCOPY  08/26/2016    w/bx     UPPER GASTROINTESTINAL ENDOSCOPY N/A 2/25/2020    EGD possible biopsy performed by Brendan Shi MD at [address] N/A 7/1/2020    EGD PUSH ENTEROSCOPY performed by Padmaja Peterson MD at [address] History         Socioeconomic History    Marital status:      Spouse name: None    Number of children: None    Years of education: None    Highest education level: None   Occupational History    Occupation: Retired-   Social Needs    Financial resource strain: None    Food insecurity     Worry: None     Inability: None    Transportation needs     Medical: None     Non-medical: None   Tobacco Use    Smoking status: Former Smoker     Packs/day: 1.00     Years: 59.00     Pack years: 59.00     Types: Cigarettes     Start date: 11/30/2017    Smokeless tobacco: Never Used    Tobacco comment: quit 2-3 weeks ago    Substance and Sexual Activity    Alcohol use: Not Currently    Drug use: No    Sexual activity: Yes   Lifestyle    Physical activity     Days per week: None     Minutes per session: None    Stress: None   Relationships    Social connections     Talks on phone: None     Gets together: None     Attends Christian service: None     Active member of club or organization: None     Attends meetings of clubs or organizations: None     Relationship status: None    Intimate partner violence     Fear of current or ex partner: None     Emotionally abused: None     Physically abused: None     Forced sexual activity: None   Other Topics Concern    None   Social History Narrative    None     Family History         Family History   Problem Relation Age of Onset    Heart Disease Father     cardiac bypass    Arthritis Father     Arthritis Mother     Other Mother      at age 80    Other Sister     [facility]    No Known Problems Daughter     Stroke Son      Current Facility-Administered Medications             Current Facility-Administered Medications   Medication Dose Route Frequency Provider Last Rate Last Dose    0.9 % sodium chloride bolus 500 mL Intravenous Once Lashell Underwood DO      propofol 1000 MG/100ML injection          propofol injection 10 mcg/kg/min Intravenous Titrated Chevy Reyes MD      fentaNYL (SUBLIMAZE) 1,000 mcg in sodium chloride 0.9 % 100 mL infusion 25 mcg/hr Intravenous Continuous Chevy Reyes MD      sodium bicarbonate 150 mEq in dextrose 5 % 1,000 mL infusion  Intravenous Continuous Chevy Reyes  mL/hr at 10/07/20 0950     0.9 % sodium chloride infusion  Intravenous Once Chevy Reyes MD      0.9 % sodium chloride infusion  Intravenous Once Chevy Reyes MD      promethazine (PHENERGAN) tablet 12.5 mg 12.5 mg Oral Q6H PRN Chevy Reyes MD      Or    ondansetron (ZOFRAN) injection 4 mg 4 mg Intravenous Q6H PRN Chevy Reyes MD      chlorhexidine (PERIDEX) 0.12 % solution 15 mL 15 mL Mouth/Throat BID Chevy Reyes MD      DOPamine (INTROPIN) 400 mg in dextrose 5 % 250 mL infusion 5 mcg/kg/min Intravenous Continuous Chevy Reyes MD 11.2 mL/hr at 10/07/20 0951 5 mcg/kg/min at 10/07/20 0951    EPINEPHrine 1 MG/10ML injection          EPINEPHrine 1 MG/10ML injection          EPINEPHrine 1 MG/10ML injection          ondansetron (ZOFRAN) injection 4 mg 4 mg Intravenous C3E PRN Ronobir Tatiana, DO  4 mg at 62/25/58 1437    carvedilol (COREG) tablet 50 mg 50 mg Oral BID Ronobir Tatiana, DO  50 mg at 99/40/66 2141    dicyclomine (BENTYL) capsule 10 mg 10 mg Oral 4x Daily PRN [employer], DO      digoxin (LANOXIN) tablet 125 mcg 125 mcg Oral Daily Ronobir Tatiana, DO  241 mcg at 10/06/20 0037    hydrALAZINE (APRESOLINE) tablet 100 mg 100 mg Oral BID Ronobir Tatiana, DO  469 mg at 91/45/87 2141    insulin glargine (LANTUS) injection vial 60 Units 60 Units Subcutaneous Nightly Ronobir Tatiana, DO      insulin lispro (HUMALOG) injection vial 20 Units 20 Units Subcutaneous TID WC Ronobir Tatinaa, DO  20 Units at 10/06/20 1635    pantoprazole (PROTONIX) tablet 40 mg 40 mg Oral QAM AC Ronobir Tatiana, DO  40 mg at 54/05/01 0629    PARoxetine (PAXIL) tablet 40 mg 40 mg Oral QAM Ronobir Tatiana, DO  40 mg at 07/38/16 0828    rosuvastatin (CRESTOR) tablet 40 mg 40 mg Oral QPM Ronobir Tatiana, DO  40 mg at 52/90/65 1635    verapamil (CALAN SR) extended release tablet 120 mg 120 mg Oral Nightly Ronobir Tatiana, DO  412 mg at 96/53/88 2140    sodium chloride flush 0.9 % injection 10 mL 10 mL Intravenous 2 times per day [employer], DO      sodium chloride flush 0.9 % injection 10 mL 10 mL Intravenous PRN Ronobir Tatiana, DO      acetaminophen (TYLENOL) tablet 650 mg 650 mg Oral P6T PRN Ronobir Tatiana, DO      Or    acetaminophen (TYLENOL) suppository 650 mg 650 mg Rectal Z9U PRN Ronobir Tatiana, DO      enoxaparin (LOVENOX) injection 30 mg 30 mg Subcutaneous Daily Ronobir Tatiana, DO  30 mg at 62/21/00 0829    insulin lispro (HUMALOG) injection vial 0-6 Units 0-6 Units Subcutaneous TID HEIDI Simpson DO  2 Units at 10/06/20 1214    insulin lispro (HUMALOG) Glucose    Collection Time: 10/06/20 11:02 AM   Result Value Ref Range    POC Glucose 216 (H) 60 - 115 mg/dl    Performed on ACCU-CHEK    POCT Glucose    Collection Time: 10/06/20 4:27 PM   Result Value Ref Range    POC Glucose 133 (H) 60 - 115 mg/dl    Performed on ACCU-CHEK    POCT Glucose    Collection Time: 10/06/20 8:33 PM   Result Value Ref Range    POC Glucose 81 60 - 115 mg/dl    Performed on ACCU-CHEK    POCT Glucose    Collection Time: 10/06/20 9:24 PM   Result Value Ref Range    POC Glucose 114 60 - 115 mg/dl    Performed on ACCU-CHEK    POCT Glucose    Collection Time: 10/07/20 6:33 AM   Result Value Ref Range    POC Glucose 221 (H) 60 - 115 mg/dl    Performed on ACCU-CHEK    CBC    Collection Time: 10/07/20 6:56 AM   Result Value Ref Range    WBC 17.7 (H) 4.8 - 10.8 K/uL    RBC 3.89 (L) 4.20 - 5.40 M/uL    Hemoglobin 9.1 (L) 12.0 - 16.0 g/dL    Hematocrit 29.9 (L) 37.0 - 47.0 %    MCV 76.8 (L) 82.0 - 100.0 fL    MCH 23.4 (L) 27.0 - 31.3 pg    MCHC 30.4 (L) 33.0 - 37.0 %    RDW 24.9 (H) 11.5 - 14.5 %    Platelets 222 817 - 897 K/uL   Basic Metabolic Panel w/ Reflex to MG    Collection Time: 10/07/20 6:56 AM   Result Value Ref Range    Sodium 135 135 - 144 mEq/L    Potassium reflex Magnesium 5.6 (H) 3.4 - 4.9 mEq/L    Chloride 100 95 - 107 mEq/L    CO2 22 20 - 31 mEq/L    Anion Gap 13 9 - 15 mEq/L    Glucose 231 (H) 70 - 99 mg/dL    BUN 44 (H) 8 - 23 mg/dL    CREATININE 3.45 (H) 0.50 - 0.90 mg/dL    GFR Non-African American 12.9 (L) >60    GFR  15.6 (L) >60    Calcium 8.6 8.5 - 9.9 mg/dL   POCT Glucose    Collection Time: 10/07/20 8:05 AM   Result Value Ref Range    POC Glucose 274 (H) 60 - 115 mg/dl    Performed on ACCU-CHEK    POCT Arterial    Collection Time: 10/07/20 9:16 AM   Result Value Ref Range    POC Sodium 139 136 - 145 mEq/L    POC Potassium 5.8 (H) 3.5 - 5.1 mEq/L    POC Chloride 101 99 - 110 mEq/L    POC Glucose 385 (H) 60 - 115 mg/dl    POC Creatinine 3.4 (H) 0.6 - 1.2 mg/dL GFR Non- 13 (A) >60    GFR  16 (A) >60    Calcium, Ion 1.04 (L) 1.12 - 1.32 mmol/L    pH, Arterial 7.246 (L) 7.350 - 7.450    pCO2, Arterial 49 (H) 35 - 45 mm Hg    pO2, Arterial 516 (HH) 75 - 108 mm Hg    HCO3, Arterial 21.3 21.0 - 29.0 mmol/L    Base Excess, Arterial -6 (L) -3 - 3    O2 Sat, Arterial 100 (HH) 93 - 100 %    TCO2, Arterial 23 22 - 29    Lactate 9.14 (HH) 0.40 - 2.00 mmol/L    POC Hematocrit 27 (L) 36 - 48 %    Hemoglobin 9.0 (L) 12.0 - 16.0 gm/dL    FIO2 100.000     Sample Type ART     Performed on SEE BELOW    Troponin:         Lab Results   Component Value Date    TROPONINI 0.041 10/05/2020     EKG: normal sinus rhythm, lvh, LATERAL ST DEPRESSION       ASSESSMENT:     Status post cardiopulmonary arrest   Urosepsis/septic shock   Sinus bradycardia/sick sinus syndrome status post temporary transvenous pacemaker secondary to hyperkalemia versus other- resolved. History of hypertrophic obstructive cardiomyopathy   History of moderate mid LAD CAD. Vasospasm noted and improved with nitro. Acute on chronic kidney injury  History of anemia   Normal LV function   Hx of HTN   HLP   Hyperkalemia      PLAN:   1. As always, aggressive risk factor modification is strongly recommended. We should adhere to the JNC VIII guidelines for HTN management and the NCEPATP III guidelines for LDL-C management. 2. Temporary pacemaker discontinued yesterday. 3. Coreg and verapamil resumed. 4. Continue to hold digoxin. 5. ICU SUPPORTIVE CARE AND MANAGEMENT  6. Avoid any nephrotoxic agents/nephrology recommendations  7. GI/DVT PROPH    Thank you for allowing me to participate in the care of your patient, please don't hesitate to contact me if you have any further questions.        Electronically signed by Bernadine Rojas DO on 10/11/2020 at 6:44 AM

## 2020-10-11 NOTE — PROGRESS NOTES
1900pm Report from [facility]. Alert and oriented. VSS MP-SR. Bilateral groin with out thrill bruit hematoma. 1+ pedal pulses. Sanchez to CD. R upper arm picc line. Site without redness swelling. 3 ports flushed patent  2100pm Resting no complaints. 0000am Resting without complaints  0300am Labored breathing with pursed lips. Accessory muscle use. sats 100% 02 increased to 31/2 L  0415am Respers nonlabored. Rate 21.  No distress noted

## 2020-10-11 NOTE — PROGRESS NOTES
Physical Therapy Med Surg Initial Assessment  [facility] [place] [facility]  Room: [place] [address]       NAME: Jossy Henson  : 1945 (16 y.o.)  MRN: [id number]  CODE STATUS: Full Code    Date of Service: 10/11/2020    Patient Diagnosis(es): Sepsis [facility]) [A41.9]   Chief Complaint   Patient presents with    Abdominal Pain     Patient Active Problem List    Diagnosis Date Noted    Cardiopulmonary arrest [facility])      Priority: High    Lumbosacral radiculopathy at L5 2015     Priority: High     Class: Acute    Spinal stenosis of lumbar region with neurogenic claudication 2015     Priority: High     Class: Chronic    High risk medication use-Norco - 17 OARRS PM&R, 18 OARRS PM&R, 18 OARRS PM&R, Urine Drug screen negative 17 PM&R--MED CONTRACT 2014     Priority: High    Sepsis ([address] 10/06/2020    Major depressive disorder in remission ([address] 10/06/2020    Gastroesophageal reflux disease without esophagitis 10/06/2020    Acute cystitis without hematuria 10/06/2020    CKD (chronic kidney disease) stage 4, GFR 15-29 ml/min ([address] 10/06/2020    Adenomatous polyp of sigmoid colon     Adenomatous polyp of transverse colon     Acute encephalopathy     Flash pulmonary edema ([address] 2020    Acute on chronic kidney failure ([address] 2020    Dysarthria     Chronic fatigue     Symptomatic anemia 2020    COPD exacerbation ([address] 2020    Anemia     AVM (arteriovenous malformation)     Gastrointestinal hemorrhage 2020    HOCM (hypertrophic obstructive cardiomyopathy) ([address] 2019    Hypoglycemia     SAÚL (acute kidney injury) ([address] 10/23/2019    Acute on chronic diastolic (congestive) heart failure ([address] 10/13/2019    Chronic obstructive pulmonary disease with acute exacerbation ([address] 10/12/2019    Hypertensive urgency 10/09/2019    Uncontrolled type 2 diabetes mellitus with hyperglycemia ([address]     Acute on chronic diastolic heart failure ([address] 10/08/2019    CHF (congestive heart failure) ([facility])     PMB (postmenopausal bleeding)     Acute combined systolic and diastolic CHF, NYHA class 1 ([address] 03/24/2019    Osteoarthritis of spine with radiculopathy, lumbar region 11/07/2018    Myalgia 05/11/2018    Intercostal neuropathy 05/11/2018    Diabetic asymmetric polyneuropathy ([address] 04/11/2017    Cervical radicular pain 12/03/2016    Reactive depression 07/15/2016    Diabetic radiculopathy ([address] 07/15/2016    Insomnia secondary to chronic pain 04/15/2016    Non-compliant patient NO showed FU 1/10/17 Dr Glen Arellano 09/24/2015    Impaired mobility and activities of daily living 03/28/2015    Neck pain 03/04/2015    Artificial lens present 10/03/2014    Presbyopia 10/03/2014    Astigmatism, regular 10/03/2014    Cataract, nuclear sclerotic senile 10/03/2014    IDDM (insulin dependent diabetes mellitus) 10/03/2014    Regular astigmatism 10/03/2014    Nuclear senile cataract 10/03/2014    Memory deficit 06/04/2014    SOB (shortness of breath) on exertion 03/14/2014    Chest pain 03/14/2014    CTS (carpal tunnel syndrome) 02/09/2014    DJD (degenerative joint disease), lumbar 02/08/2014    Lumbosacral radiculopathy at S1 02/08/2014    DDD (degenerative disc disease), lumbar 02/08/2014    Dysphonia 02/08/2014    Insomnia 12/04/2013    Smoker 06/18/2013    Hypertension     Hyperlipidemia     Uncontrolled type 2 diabetes mellitus with complication, without long-term current use of insulin ([facility])     Fibromyalgia     Anxiety         Past Medical History:   Diagnosis Date    Anxiety     Asthma     dx 2019 / has smoked since age 14    CHF (congestive heart failure) ([facility])     Chronic back pain     Bilateral L5 S1 Radic on emg--surprisingly worse on the left than the right--pt's symptoms and her MRI show worse on the right    Chronic obstructive pulmonary disease with acute exacerbation ([address] 10/12/2019    Depression     Fibromyalgia     Hyperlipidemia     meds > 8 yrs    Hypertension     meds > 45 yrs    On home O2     2l per n/c at bedtime mostly,     Osteoarthritis     Type II diabetes mellitus, uncontrolled ([address]     hx > 8 yrs    Unspecified sleep apnea      Past Surgical History:   Procedure Laterality Date    BACK SURGERY  2017    lumbar disc    CARDIAC CATHETERIZATION  11/3/14     DR. MIRELES / no stents    COLONOSCOPY  08/29/2016    w/polypectomy     COLONOSCOPY N/A 9/29/2020    COLONOSCOPY WITH POLYPECTOMY performed by Sean Hanna MD at [facility] N/A 10/7/2019    EUA HYSTEROSCOPY DILATATION AND CURETTAGE performed by Deandre Welch DO at [facility]. [address], COLON, DIAGNOSTIC      EYE SURGERY      [place] with IOL OU / [id number]    umbilical hernia repair    NH ESOPHAGOGASTRODUODENOSCOPY TRANSORAL DIAGNOSTIC N/A 3/24/2017    EGD ESOPHAGOGASTRODUODENOSCOPY performed by Shaye Wharton MD at [place] [address] 2/8/2018    negative findings    NH REVISE MEDIAN N/CARPAL TUNNEL SURG Left 6/5/2017    LEFT  [facility] N/A 2/8/2018    TONSILLECTOMY      as child    UPPER GASTROINTESTINAL ENDOSCOPY  08/26/2016    w/bx     UPPER GASTROINTESTINAL ENDOSCOPY N/A 2/25/2020    EGD possible biopsy performed by Sean Hanna MD at [facility] 7/1/2020    EGD PUSH ENTEROSCOPY performed by Karina David MD at [facility]       Chart Reviewed: Yes  Patient assessed for rehabilitation services?: Yes  Family / Caregiver Present: No  General Comment  Comments: pt sleeping upon arrival, awakes with verbal stim    Restrictions:  Restrictions/Precautions: Fall Risk     SUBJECTIVE: Subjective: Pt denies pain currently just fatigued.   Response To Previous Treatment: Not applicable    Pain  Pre Treatment Pain Screening  Pain at present: 0  Scale Used: Numeric Score    Post Treatment Pain Screening:   Pain Screening  Patient Currently in Pain: Yes  Pain Assessment  Pain Assessment: 0-10  Pain Level: 0    Prior Level of Function:  Social/Functional History  Lives With: Significant other, Spouse  Type of Home: House  Home Layout: Two level(8-4 withhandrail, family plans to have stair lift installed)  Home Access: Stairs to enter with rails  Entrance Stairs - Number of Steps: 4  Bathroom Shower/Tub: Tub/Shower unit  Bathroom Equipment: Shower chair, Grab bars in shower  Home Equipment: Rolling walker, Cane  ADL Assistance: Needs assistance(supervision with bathing, requires increased time effort for socks/shoes)  Homemaking Assistance: Needs assistance  Homemaking Responsibilities: No(spouse performs)  Ambulation Assistance: Independent(cane vs 2ww)  Transfer Assistance: Independent  Active : No    OBJECTIVE:   Hearing: Within functional limits    Cognition:  Overall Orientation Status: Within Functional Limits  Orientation Level: Oriented to place, Oriented to situation, Oriented to person  Follows Commands: Within Functional Limits    Observation/Palpation  Posture: Good  Observation: pleasant, cooperative, soft spoken  Edema: B hand /feet edema    ROM:  RLE PROM: WFL  LLE PROM: WFL    Strength:  Strength RLE  Comment: hip 3+/5, knee 3+/5, ankle 4/5  Strength LLE  Comment: hip 3+/5, knee 3+/5, ankle 4/5  Strength Other  Other: assessed in sitting    Neuro:  Balance  Posture: Fair  Sitting - Static: Fair;+  Sitting - Dynamic: Fair;+     Motor Control  Gross Motor?: WFL  Sensation  Overall Sensation Status: WFL(denies numbess/tingling)    Bed mobility  Bridging: Stand by assistance  Supine to Sit: Minimal assistance  Sit to Supine: Minimal assistance  Scooting: Maximal assistance  Comment: increased time and effort, VC to improve indep and sequencing efficiency with task    Transfers  Comment: deferred d/t fatigue, pt up was to bedside chair with nursing staff all morning with 1 person assist    Ambulation  Ambulation?: No deferred d/t fatigue, recommend 2 person for safety with use of 2ww upon 1st attempt    Activity Tolerance  Activity Tolerance: Patient limited by fatigue      PT Education  PT Education: Goals;PT Role;Plan of Care;General Safety; Energy Conservation    ASSESSMENT:   Body structures, Functions, Activity limitations: Decreased functional mobility ; Decreased safe awareness;Decreased balance;Decreased strength;Decreased endurance  Decision Making: Medium Complexity  History: high  Exam: high  Clinical Presentation: med    Prognosis: Good  Barriers to Learning: none    DISCHARGE RECOMMENDATIONS:  Discharge Recommendations: Continue to assess pending progress    Assessment: Pt demonstrates the above deficits and decline in functional mobility status as a result of complex hospital admission and prolonged immobility while on ventilator. Pt would benefit from physical therapy to address above deficits and allow for safe return home at highest level of function, decrease risk for falls, and improve QOL.     REQUIRES PT FOLLOW UP: Yes      PLAN OF CARE:  Plan  Times per week: 3-6  Current Treatment Recommendations: Strengthening, Transfer Training, Endurance Training, Neuromuscular Re-education, Patient/Caregiver Education & Training, Equipment Evaluation, Education, & procurement, Balance Training, Gait Training, Home Exercise Program, Safety Education & Training, Modalities, Manual Therapy - Joint Manipulation, Functional Mobility Training, ROM, Wheelchair Mobility Training, Stair training  Safety Devices  Type of devices: Left in bed, Call light within reach, Nurse notified, Bed alarm in place    Goals:  Patient goals : \"return home\"  Long term goals  Long term goal 1: Bed mobility with indep  Long term goal 2: Functional transfers with indep  Long term goal 3: Amb 50ft with LRAD and indep  Long term goal 4: 4 steps with handrail and SBA to enter/exit home    AMPAC (6 CLICK) BASIC MOBILITY  AM-PAC Inpatient Mobility Raw Score : 15     Therapy Time:   Individual   Time In  1430   Time Out  1445   Minutes  [address], [place], 10/11/20 at 2:57 PM       Definitions for assistance levels  Independent = pt does not require any physical supervision or assistance from another person for activity completion. Device may be needed.   Stand by assistance = pt requires verbal cues or instructions from another person, close to but not touching, to perform the activity  Minimal assistance= pt performs 75% or more of the activity; assistance is required to complete the activity  Moderate assistance= pt performs 50% of the activity; assistance is required to complete the activity  Maximal assistance = pt performs 25% of the activity; assistance is required to complete the activity  Dependent = pt requires total physical assistance to accomplish the task

## 2020-10-12 LAB
ANION GAP SERPL CALCULATED.3IONS-SCNC: 17 MEQ/L (ref 9–15)
BUN BLDV-MCNC: 51 MG/DL (ref 8–23)
CALCIUM SERPL-MCNC: 6.9 MG/DL (ref 8.5–9.9)
CHLORIDE BLD-SCNC: 99 MEQ/L (ref 95–107)
CO2: 24 MEQ/L (ref 20–31)
CREAT SERPL-MCNC: 4.4 MG/DL (ref 0.5–0.9)
GFR AFRICAN AMERICAN: 11.8
GFR NON-AFRICAN AMERICAN: 9.7
GLUCOSE BLD-MCNC: 264 MG/DL (ref 70–99)
GLUCOSE BLD-MCNC: 306 MG/DL (ref 60–115)
GLUCOSE BLD-MCNC: 359 MG/DL (ref 60–115)
GLUCOSE BLD-MCNC: 460 MG/DL (ref 60–115)
HBA1C MFR BLD: 10.1 % (ref 4.8–5.9)
HCT VFR BLD CALC: 20 % (ref 37–47)
HCT VFR BLD CALC: 26.1 % (ref 37–47)
HEMOGLOBIN: 6.4 G/DL (ref 12–16)
HEMOGLOBIN: 8.1 G/DL (ref 12–16)
MAGNESIUM: 2.3 MG/DL (ref 1.7–2.4)
MCH RBC QN AUTO: 24.3 PG (ref 27–31.3)
MCHC RBC AUTO-ENTMCNC: 31.1 % (ref 33–37)
MCV RBC AUTO: 78.2 FL (ref 82–100)
PDW BLD-RTO: 23.7 % (ref 11.5–14.5)
PERFORMED ON: ABNORMAL
PLATELET # BLD: 161 K/UL (ref 130–400)
POTASSIUM REFLEX MAGNESIUM: 3.1 MEQ/L (ref 3.4–4.9)
RBC # BLD: 3.33 M/UL (ref 4.2–5.4)
SODIUM BLD-SCNC: 140 MEQ/L (ref 135–144)
WBC # BLD: 11.9 K/UL (ref 4.8–10.8)

## 2020-10-12 PROCEDURE — 36415 COLL VENOUS BLD VENIPUNCTURE: CPT

## 2020-10-12 PROCEDURE — 85027 COMPLETE CBC AUTOMATED: CPT

## 2020-10-12 PROCEDURE — 2580000003 HC RX 258: Performed by: INTERNAL MEDICINE

## 2020-10-12 PROCEDURE — 6370000000 HC RX 637 (ALT 250 FOR IP): Performed by: INTERNAL MEDICINE

## 2020-10-12 PROCEDURE — 6360000002 HC RX W HCPCS: Performed by: INTERNAL MEDICINE

## 2020-10-12 PROCEDURE — APPNB60 APP NON BILLABLE TIME 46-60 MINS: Performed by: NURSE PRACTITIONER

## 2020-10-12 PROCEDURE — 83735 ASSAY OF MAGNESIUM: CPT

## 2020-10-12 PROCEDURE — 99221 1ST HOSP IP/OBS SF/LOW 40: CPT | Performed by: INTERNAL MEDICINE

## 2020-10-12 PROCEDURE — C9113 INJ PANTOPRAZOLE SODIUM, VIA: HCPCS | Performed by: INTERNAL MEDICINE

## 2020-10-12 PROCEDURE — 99232 SBSQ HOSP IP/OBS MODERATE 35: CPT | Performed by: INTERNAL MEDICINE

## 2020-10-12 PROCEDURE — 2060000000 HC ICU INTERMEDIATE R&B

## 2020-10-12 PROCEDURE — 85014 HEMATOCRIT: CPT

## 2020-10-12 PROCEDURE — 85018 HEMOGLOBIN: CPT

## 2020-10-12 PROCEDURE — 83036 HEMOGLOBIN GLYCOSYLATED A1C: CPT

## 2020-10-12 PROCEDURE — 99233 SBSQ HOSP IP/OBS HIGH 50: CPT | Performed by: INTERNAL MEDICINE

## 2020-10-12 PROCEDURE — 80048 BASIC METABOLIC PNL TOTAL CA: CPT

## 2020-10-12 PROCEDURE — 2500000003 HC RX 250 WO HCPCS: Performed by: INTERNAL MEDICINE

## 2020-10-12 RX ORDER — CLONIDINE HYDROCHLORIDE 0.1 MG/1
0.1 TABLET ORAL 2 TIMES DAILY
Status: DISCONTINUED | OUTPATIENT
Start: 2020-10-12 | End: 2020-10-13

## 2020-10-12 RX ORDER — CARVEDILOL 12.5 MG/1
12.5 TABLET ORAL 2 TIMES DAILY WITH MEALS
Status: DISCONTINUED | OUTPATIENT
Start: 2020-10-12 | End: 2020-10-12

## 2020-10-12 RX ORDER — DEXTROSE MONOHYDRATE 25 G/50ML
12.5 INJECTION, SOLUTION INTRAVENOUS PRN
Status: DISCONTINUED | OUTPATIENT
Start: 2020-10-12 | End: 2020-10-12 | Stop reason: SDUPTHER

## 2020-10-12 RX ORDER — 0.9 % SODIUM CHLORIDE 0.9 %
20 INTRAVENOUS SOLUTION INTRAVENOUS ONCE
Status: COMPLETED | OUTPATIENT
Start: 2020-10-12 | End: 2020-10-13

## 2020-10-12 RX ORDER — DEXTROSE MONOHYDRATE 50 MG/ML
100 INJECTION, SOLUTION INTRAVENOUS PRN
Status: DISCONTINUED | OUTPATIENT
Start: 2020-10-12 | End: 2020-10-12 | Stop reason: SDUPTHER

## 2020-10-12 RX ORDER — INSULIN GLARGINE 100 [IU]/ML
15 INJECTION, SOLUTION SUBCUTANEOUS NIGHTLY
Status: DISCONTINUED | OUTPATIENT
Start: 2020-10-12 | End: 2020-10-13

## 2020-10-12 RX ORDER — NICOTINE POLACRILEX 4 MG
15 LOZENGE BUCCAL PRN
Status: DISCONTINUED | OUTPATIENT
Start: 2020-10-12 | End: 2020-10-12 | Stop reason: SDUPTHER

## 2020-10-12 RX ORDER — CARVEDILOL 25 MG/1
25 TABLET ORAL 2 TIMES DAILY WITH MEALS
Status: DISCONTINUED | OUTPATIENT
Start: 2020-10-12 | End: 2020-10-20 | Stop reason: HOSPADM

## 2020-10-12 RX ORDER — SPIRONOLACTONE 25 MG/1
25 TABLET ORAL DAILY
Status: DISCONTINUED | OUTPATIENT
Start: 2020-10-12 | End: 2020-10-20 | Stop reason: HOSPADM

## 2020-10-12 RX ORDER — ISOSORBIDE MONONITRATE 30 MG/1
60 TABLET, EXTENDED RELEASE ORAL DAILY
Status: DISCONTINUED | OUTPATIENT
Start: 2020-10-12 | End: 2020-10-12

## 2020-10-12 RX ORDER — ISOSORBIDE MONONITRATE 30 MG/1
30 TABLET, EXTENDED RELEASE ORAL DAILY
Status: DISCONTINUED | OUTPATIENT
Start: 2020-10-13 | End: 2020-10-13

## 2020-10-12 RX ADMIN — PIPERACILLIN AND TAZOBACTAM 2.25 G: 2; .25 INJECTION, POWDER, LYOPHILIZED, FOR SOLUTION INTRAVENOUS at 20:55

## 2020-10-12 RX ADMIN — INSULIN GLARGINE 15 UNITS: 100 INJECTION, SOLUTION SUBCUTANEOUS at 22:57

## 2020-10-12 RX ADMIN — POTASSIUM CHLORIDE 40 MEQ: 29.8 INJECTION, SOLUTION INTRAVENOUS at 05:01

## 2020-10-12 RX ADMIN — PIPERACILLIN AND TAZOBACTAM 2.25 G: 2; .25 INJECTION, POWDER, LYOPHILIZED, FOR SOLUTION INTRAVENOUS at 02:34

## 2020-10-12 RX ADMIN — ISOSORBIDE MONONITRATE 60 MG: 30 TABLET, EXTENDED RELEASE ORAL at 13:36

## 2020-10-12 RX ADMIN — INSULIN LISPRO 5 UNITS: 100 INJECTION, SOLUTION INTRAVENOUS; SUBCUTANEOUS at 16:41

## 2020-10-12 RX ADMIN — FUROSEMIDE 5 MG/HR: 10 INJECTION, SOLUTION INTRAVENOUS at 17:08

## 2020-10-12 RX ADMIN — Medication 10 ML: at 20:56

## 2020-10-12 RX ADMIN — PIPERACILLIN AND TAZOBACTAM 2.25 G: 2; .25 INJECTION, POWDER, LYOPHILIZED, FOR SOLUTION INTRAVENOUS at 11:34

## 2020-10-12 RX ADMIN — HEPARIN SODIUM 5000 UNITS: 5000 INJECTION INTRAVENOUS; SUBCUTANEOUS at 22:57

## 2020-10-12 RX ADMIN — HEPARIN SODIUM 5000 UNITS: 5000 INJECTION INTRAVENOUS; SUBCUTANEOUS at 14:59

## 2020-10-12 RX ADMIN — CARVEDILOL 6.25 MG: 6.25 TABLET, FILM COATED ORAL at 08:38

## 2020-10-12 RX ADMIN — VERAPAMIL HYDROCHLORIDE 120 MG: 240 TABLET, FILM COATED, EXTENDED RELEASE ORAL at 20:56

## 2020-10-12 RX ADMIN — POTASSIUM CHLORIDE 20 MEQ: 29.8 INJECTION, SOLUTION INTRAVENOUS at 11:30

## 2020-10-12 RX ADMIN — LABETALOL HYDROCHLORIDE 10 MG: 5 INJECTION, SOLUTION INTRAVENOUS at 23:06

## 2020-10-12 RX ADMIN — LABETALOL HYDROCHLORIDE 10 MG: 5 INJECTION, SOLUTION INTRAVENOUS at 08:48

## 2020-10-12 RX ADMIN — HYDROCORTISONE SODIUM SUCCINATE 100 MG: 100 INJECTION, POWDER, FOR SOLUTION INTRAMUSCULAR; INTRAVENOUS at 08:38

## 2020-10-12 RX ADMIN — LABETALOL HYDROCHLORIDE 10 MG: 5 INJECTION, SOLUTION INTRAVENOUS at 01:47

## 2020-10-12 RX ADMIN — PANTOPRAZOLE SODIUM 40 MG: 40 INJECTION, POWDER, FOR SOLUTION INTRAVENOUS at 08:38

## 2020-10-12 RX ADMIN — CARVEDILOL 25 MG: 25 TABLET, FILM COATED ORAL at 16:40

## 2020-10-12 RX ADMIN — Medication 5 ML: at 16:41

## 2020-10-12 RX ADMIN — CLONIDINE HYDROCHLORIDE 0.1 MG: 0.1 TABLET ORAL at 16:40

## 2020-10-12 RX ADMIN — HEPARIN SODIUM 5000 UNITS: 5000 INJECTION INTRAVENOUS; SUBCUTANEOUS at 05:43

## 2020-10-12 RX ADMIN — ROSUVASTATIN CALCIUM 40 MG: 40 TABLET, FILM COATED ORAL at 20:55

## 2020-10-12 RX ADMIN — INSULIN LISPRO 4 UNITS: 100 INJECTION, SOLUTION INTRAVENOUS; SUBCUTANEOUS at 11:37

## 2020-10-12 RX ADMIN — CLONIDINE HYDROCHLORIDE 0.2 MG: 0.1 TABLET ORAL at 04:05

## 2020-10-12 RX ADMIN — SPIRONOLACTONE 25 MG: 25 TABLET, FILM COATED ORAL at 16:40

## 2020-10-12 RX ADMIN — Medication 10 ML: at 08:38

## 2020-10-12 ASSESSMENT — PAIN SCALES - GENERAL
PAINLEVEL_OUTOF10: 0

## 2020-10-12 NOTE — PROGRESS NOTES
Chronic obstructive pulmonary disease with acute exacerbation (HCC)     Acute on chronic diastolic (congestive) heart failure (HCC)     SAÚL (acute kidney injury) (HCC)     Hypoglycemia     HOCM (hypertrophic obstructive cardiomyopathy) (HCC)     Gastrointestinal hemorrhage     COPD exacerbation (HCC)     Anemia     AVM (arteriovenous malformation)     Symptomatic anemia     Flash pulmonary edema (HCC)     Acute on chronic kidney failure (HCC)     Dysarthria     Chronic fatigue     Acute encephalopathy     Adenomatous polyp of sigmoid colon     Adenomatous polyp of transverse colon     Sepsis (HCC)     Major depressive disorder in remission [facility]     Gastroesophageal reflux disease without esophagitis     Acute cystitis without hematuria     CKD (chronic kidney disease) stage 4, GFR 15-29 ml/min (HCC)     Cardiopulmonary arrest ([facility]      Subjective:  Admit Date: 10/5/2020    Interval History: feels better since tubes out    Medications:  Scheduled Meds:   carvedilol  12.5 mg Oral BID WC    isosorbide mononitrate  60 mg Oral Daily    sodium chloride  20 mL Intravenous Once    verapamil  120 mg Oral Nightly    cloNIDine  0.2 mg Oral BID    insulin lispro  0-6 Units Subcutaneous TID WC    insulin lispro  0-3 Units Subcutaneous Nightly    sodium chloride flush  10 mL Intravenous 2 times per day    heparin (porcine)  5,000 Units Subcutaneous 3 times per day    hydrocortisone sodium succinate PF  100 mg Intravenous Daily    pantoprazole  40 mg Intravenous Daily    And    sodium chloride (PF)  10 mL Intravenous Daily    piperacillin-tazobactam  2.25 g Intravenous Q8H    [Held by provider] digoxin  125 mcg Oral Daily    [Held by provider] PARoxetine  40 mg Oral QAM    rosuvastatin  40 mg Oral QPM     Continuous Infusions:   dextrose         CBC:   Recent Labs     10/11/20  0555 10/12/20  0245   WBC 12.6* 11.9*   HGB 8.3* 8.1*    161     CMP:    Recent Labs     10/10/20  0545 10/11/20  0557 10/12/20  0245    141 140   K 3.5 3.4 3.1*   CL 94* 97 99   CO2 29 26 24   BUN 37* 47* 51*   CREATININE 3.58* 4.01* 4.40*   GLUCOSE 259* 274* 264*   CALCIUM 6.3* 6.3* 6.9*   LABGLOM 12.4* 10.8* 9.7*     Troponin: No results for input(s): TROPONINI in the last 72 hours. BNP: No results for input(s): BNP in the last 72 hours. INR: No results for input(s): INR in the last 72 hours. Lipids: No results for input(s): CHOL, LDLDIRECT, TRIG, HDL, AMYLASE, LIPASE in the last 72 hours. Liver: No results for input(s): AST, ALT, ALKPHOS, PROT, LABALBU, BILITOT in the last 72 hours. Invalid input(s): BILDIR  Iron:  No results for input(s): IRONS, FERRITIN in the last 72 hours. Invalid input(s): LABIRONS  Urinalysis: No results for input(s): UA in the last 72 hours.     Objective:  Vitals: BP (!) 183/76   Pulse 73   Temp 98 °F (36.7 °C) (Oral)   Resp 18   Ht 4' 11\" (1.499 m)   Wt 160 lb 7.9 oz (72.8 kg)   LMP  (LMP Unknown)   SpO2 100%   BMI 32.42 kg/m²    Wt Readings from Last 3 Encounters:   10/10/20 160 lb 7.9 oz (72.8 kg)   09/29/20 130 lb (59 kg)   08/24/20 130 lb (59 kg)      24HR INTAKE/OUTPUT:      Intake/Output Summary (Last 24 hours) at 10/12/2020 1559  Last data filed at 10/12/2020 9437  Gross per 24 hour   Intake 830 ml   Output 1800 ml   Net -970 ml       General: alert, in no apparent distress  HEENT: normocephalic, atraumatic, anicteric  SOB with movement  Neck: supple, no mass  Lungs: non-labored respirations, clear to auscultation bilaterally  Heart: regular rate and rhythm, 1/6 nsystolic murmurs or rubs  Abdomen: soft, non-tender, non-distended  Ext: no cyanosis,1+ peripheral edema  Neuro: alert and oriented, no gross abnormalities  Psych: normal mood and affect  Skin: no rash      Electronically signed by Ferd Bosworth, MD

## 2020-10-12 NOTE — PROGRESS NOTES
While changing pt after inc of stool. Small amt of gelatinous blood noted. Pt. Denies any abd pain or discomfort. Due for am labs with hgb.   Will monitor closely

## 2020-10-12 NOTE — PROGRESS NOTES
Physical Therapy Missed Treatment   Facility/Department: [facility] MED SURG IC08/IC08-01    NAME: Sade Nick    : 1945 (67 y.o.)  MRN: [id number]    Account: [de-identified]  Gender: female    Chart reviewed, attempted PT at 9:35. Patient unavailable 2° to:    [] Hold per nsg request    [] Pt declined    [] Nsg notified   [] Other notified    [] Pt. . off floor for test/procedure. [x] Pt. Unavailable - Pt required personal care. Incontinent of stool. nsg notified. Will attempt PT treatment again at earliest convenience.       Electronically signed by Venkata Villarreal PTA on 10/12/20 at 9:51 AM EDT

## 2020-10-12 NOTE — PROGRESS NOTES
Chief Complaint   Patient presents with    Abdominal Pain     Patient is a 67 y.o. female who presents with a chief complaint of weakness. . Patient is followed on a regular basis by Dr. Belle Cordova MD. Apparently patient had been expressing fevers and chills as well as weakness and presented to the emergency department, also complained of dysuria. Patient with history of diastolic congestive heart failure, chronic kidney disease, anemia, history of hypertrophic obstructive cardiomyopathy, gastric AVM. Status post cardiac catheterization in October 2019 that showed normal coronary arteries. Patient was admitted and being treated for urinary tract infection. Apparently she was noted to be hypotensive and bradycardic this morning and CODE BLUE was called. CPR was initiated and patient was given multiple rounds of epinephrine as well as atropine and CPR was initiated for approximate 20 minutes and ROSC was achieved. I was contacted by intensivist to evaluate patient for hypotension as well as bradycardia. Patient was thought to have an acute cardiac event. She was bradycardic in the 30s to 40s with blood pressure in the 70s to 80s on 2 pressors. 10/8/2020[de-identified] Patient is Arctic sun protocol still. She is intubated and sedated. She is currently off of IV pressors and her potassium has been corrected. Transvenous pacemaker in place and set at 45 bpm, currently she is in sinus bradycardia on telemetry heart rate in the 40s to 50s. Creatinine is 3.17. Potassium is 3.1. WBC is down to 11.     10/9/2020: Remains on Arctic sun and intubated and sedated. She is independent of transvenous pacemaker heart rate is currently normal sinus rhythm in the 80s. Potassium is actually low and is being corrected. She is not on any pressors, blood pressure stable. 10/10/2020: Patient is extubated and very alert today. Family is at the bedside. She denies any chest pain or shortness of breath.   She is audibly wheezing. She is in normal sinus rhythm on telemetry with heart rate into the 80s or 90s for. Not dependent on transvenous pacer. Transvenous pacer pulled at bedside. 10/11/2020: Patient is resting comfortably. No new events overnight. Normal sinus rhythm on telemetry. Potassium is 3.4. Hemoglobin is 8.3. Renal function worsening with a creatinine of 4.0 now. She is hemodynamically stable. 10-12-20: transfering to tele. Denies any CP or SOB. + diarrhea. HD stable. NSR on tele.        Past Medical History        Past Medical History:   Diagnosis Date    Anxiety     Asthma     dx 2019 / has smoked since age 14    CH[facility]congestive heart failure) [facility]     Chronic back pain     Bilateral L5 S1 Radic on emg--surprisingly worse on the left than the right--pt's symptoms and her MRI show worse on the right    Chronic obstructive pulmonary disease with acute exacerbation ([facility] [address] 10/12/2019    Depression     Fibromyalgia     Hyperlipidemia     meds > 8 yrs    Hypertension     meds > 45 yrs    On home O2     2l per n/c at bedtime mostly,     Osteoarthritis     Type II diabetes mellitus, uncontrolled ([facility])     hx > 8 yrs    Unspecified sleep apnea          Patient Active Problem List   Diagnosis    Hypertension    Hyperlipidemia    Uncontrolled type 2 diabetes mellitus with complication, without long-term current use of insulin ([facility])    Fibromyalgia    Anxiety    Smoker    Insomnia    DJD (degenerative joint disease), lumbar    Lumbosacral radiculopathy at S1    DDD (degenerative disc disease), lumbar    Dysphonia    CTS (carpal tunnel syndrome)    High risk medication use-Norco - 12/20/17 O[facility] PM&R, 02/20/18 [facility] PM&R, 03/07/18 [facility] PM&R, Urine Drug screen negative 02/06/17 PM&R--MED CONTRACT 2/6/17    SOB (shortness of breath) on exertion    Chest pain    Memory deficit    Artificial lens present    Presbyopia    Astigmatism, regular    Cataract, nuclear sclerotic senile    IDDM (insulin dependent diabetes mellitus)    Regular astigmatism    Nuclear senile cataract    Neck pain    Lumbosacral radiculopathy at L5    Spinal stenosis of lumbar region with neurogenic claudication    Impaired mobility and activities of daily living    Non-compliant patient NO showed FU 1/10/17 Dr Aury Sepulveda Insomnia secondary to chronic pain    Reactive depression    Diabetic radiculopathy (HCC)    Cervical radicular pain    Diabetic asymmetric polyneuropathy (HCC)    Myalgia    Intercostal neuropathy    Osteoarthritis of spine with radiculopathy, lumbar region    Acute combined systolic and diastolic CHF, NYHA class 1 (HCC)    PMB (postmenopausal bleeding)    Acute on chronic diastolic heart failure (HCC)    CHF (congestive heart failure) (HCC)    Hypertensive urgency    Uncontrolled type 2 diabetes mellitus with hyperglycemia (HCC)    Chronic obstructive pulmonary disease with acute exacerbation (HCC)    Acute on chronic diastolic (congestive) heart failure (HCC)    SAÚL (acute kidney injury) ([address]    Hypoglycemia    HOCM (hypertrophic obstructive cardiomyopathy) (HCC)    Gastrointestinal hemorrhage    COPD exacerbation (HCC)    Anemia    AVM (arteriovenous malformation)    Symptomatic anemia    Flash pulmonary edema (HCC)    Acute on chronic kidney failure (HCC)    Dysarthria    Chronic fatigue    Acute encephalopathy    Adenomatous polyp of sigmoid colon    Adenomatous polyp of transverse colon    Sepsis (HCC)    Major depressive disorder in remission ([address]    Gastroesophageal reflux disease without esophagitis    Acute cystitis without hematuria    CKD (chronic kidney disease) stage 4, GFR 15-29 ml/min ([facility])     Past Surgical History         Past Surgical History:   Procedure Laterality Date    BACK SURGERY  2017    lumbar disc    CARDIAC CATHETERIZATION  11/3/14     DR. MIRELES / no stents    COLONOSCOPY  08/29/2016    w/polypectomy     COLONOSCOPY N/A per week: None     Minutes per session: None    Stress: None   Relationships    Social connections     Talks on phone: None     Gets together: None     Attends Gnosticism service: None     Active member of club or organization: None     Attends meetings of clubs or organizations: None     Relationship status: None    Intimate partner violence     Fear of current or ex partner: None     Emotionally abused: None     Physically abused: None     Forced sexual activity: None   Other Topics Concern    None   Social History Narrative    None     Family History         Family History   Problem Relation Age of Onset    Heart Disease Father     cardiac bypass    Arthritis Father     Arthritis Mother     Other Mother      at age 80    Other Sister     [facility]    No Known Problems Daughter     Stroke Son      Current Facility-Administered Medications             Current Facility-Administered Medications   Medication Dose Route Frequency Provider Last Rate Last Dose    0.9 % sodium chloride bolus 500 mL Intravenous Once Bartolo Wagoner DO      propofol 1000 MG/100ML injection          propofol injection 10 mcg/kg/min Intravenous Titrated Nury Mina MD      fentaNYL (SUBLIMAZE) 1,000 mcg in sodium chloride 0.9 % 100 mL infusion 25 mcg/hr Intravenous Continuous Nury Mina MD      sodium bicarbonate 150 mEq in dextrose 5 % 1,000 mL infusion  Intravenous Continuous Nury Mina  mL/hr at 10/07/20 0950     0.9 % sodium chloride infusion  Intravenous Once Nury Mina MD      0.9 % sodium chloride infusion  Intravenous Once Nury Mina MD      promethazine (PHENERGAN) tablet 12.5 mg 12.5 mg Oral Q6H PRN Nury Mina MD      Or    ondansetron (ZOFRAN) injection 4 mg 4 mg Intravenous Q6H PRN Nury Mina MD      chlorhexidine (PERIDEX) 0.12 % solution 15 mL 15 mL Mouth/Throat BID Nury Mina MD      DOPamine (INTROPIN) 400 mg in dextrose 5 % 250 mL infusion 5 mcg/kg/min Intravenous Continuous Lindsay Ward MD 11.2 mL/hr at 10/07/20 0951 5 mcg/kg/min at 10/07/20 0951    EPINEPHrine 1 MG/10ML injection          EPINEPHrine 1 MG/10ML injection          EPINEPHrine 1 MG/10ML injection          ondansetron (ZOFRAN) injection 4 mg 4 mg Intravenous I6K PRN Ronobir Tatiana, DO  4 mg at 22/96/23 1437    carvedilol (COREG) tablet 50 mg 50 mg Oral BID Ronobir Tatiana, DO  50 mg at 27/13/74 2141    dicyclomine (BENTYL) capsule 10 mg 10 mg Oral 4x Daily PRN VAISHALIRMaribell [employer], DO      digoxin (LANOXIN) tablet 125 mcg 125 mcg Oral Daily Ronobir Tatiana, DO  561 mcg at 10/06/20 2430    hydrALAZINE (APRESOLINE) tablet 100 mg 100 mg Oral BID Ronobir Tatiana, DO  924 mg at 31/09/97 2141    insulin glargine (LANTUS) injection vial 60 Units 60 Units Subcutaneous Nightly Ronobir Tatiana, DO      insulin lispro (HUMALOG) injection vial 20 Units 20 Units Subcutaneous TID WC Ronobir Tatiana, DO  20 Units at 10/06/20 1635    pantoprazole (PROTONIX) tablet 40 mg 40 mg Oral QAM AC Ronobir Tatiana, DO  40 mg at 88/29/74 0629    PARoxetine (PAXIL) tablet 40 mg 40 mg Oral QAM Ronobir Tatiana, DO  40 mg at 34/10/09 0828    rosuvastatin (CRESTOR) tablet 40 mg 40 mg Oral QPM Ronobir Tatiana, DO  40 mg at 60/16/40 1635    verapamil (CALAN SR) extended release tablet 120 mg 120 mg Oral Nightly Ronobir Tatiana, DO  734 mg at 16/88/16 2140    sodium chloride flush 0.9 % injection 10 mL 10 mL Intravenous 2 times per day VAISHALIRMaribell [employer], DO      sodium chloride flush 0.9 % injection 10 mL 10 mL Intravenous PRN Kendallobir Tatiana, DO      acetaminophen (TYLENOL) tablet 650 mg 650 mg Oral G9P PRN Kendallobir Tatiana, DO      Or    acetaminophen (TYLENOL) suppository 650 mg 650 mg Rectal G0C PRN Ramsey Simpson DO      enoxaparin (LOVENOX) injection 30 mg 30 mg Subcutaneous Daily Ramsey Simpson DO  30 mg at 07/86/06 0829    insulin lispro (HUMALOG) injection vial 0-6 Units 0-6 Units Subcutaneous TID WC Ronobir Tatiana, DO  2 Units at 10/06/20 1214    insulin lispro (HUMALOG) injection vial 0-3 Units 0-3 Units Subcutaneous Nightly Ronobir Tatiana, DO      glucose (GLUTOSE) 40 % oral gel 15 g 15 g Oral PRN Ronobir Tatiana, DO      dextrose 50 % IV solution 12.5 g Intravenous PRN Ronobir Tatiana, DO      glucagon (rDNA) injection 1 mg 1 mg Intramuscular PRN Ronobir Tatiana, DO      dextrose 5 % solution 100 mL/hr Intravenous PRN Ronobir Tatiana, DO      0.9 % sodium chloride infusion  Intravenous Continuous Ronobir Tatiana, DO 75 mL/hr at 10/06/20 1951     ciprofloxacin (CIPRO) IVPB 400 mg 400 mg Intravenous Q24H , DO  Stopped at 10/06/20 1148    iopamidol (ISOVUE-300) 61 % injection 100 mL 100 mL Intravenous ONCE PRN DIANE Stanford     ALLERGIES: Ibuprofen; Metformin and related; and Darvon [propoxyphene hcl]   Review of Systems   Unable to perform ROS: Intubated     VITALS:   Blood pressure (!) 147/57, pulse 55, temperature 97.7 °F (36.5 °C), resp. rate 18, height 4' 11\" (1.499 m), weight 131 lb 2.8 oz (59.5 kg), SpO2 100 %, not currently breastfeeding. Body mass index is 26.49 kg/m². Physical Exam   HENT:   Head: Normocephalic and atraumatic. Neck: Neck supple. No JVD present. No tracheal deviation present. No thyromegaly present. Cardiovascular: Normal rate, regular rhythm, normal heart sounds and intact distal pulses. PMI is not displaced. Exam reveals no gallop, no S3, no distant heart sounds and no friction rub. No murmur heard. Pulmonary/Chest: No respiratory distress. She has no wheezes. She has no rales. She exhibits no tenderness. Abdominal: Soft. Bowel sounds are normal. She exhibits no distension and no mass. There is no abdominal tenderness. There is no rebound and no guarding. Musculoskeletal:   General: No edema. Neurological: No cranial nerve deficit. Skin: Skin is warm and dry. No rash noted. She is not diaphoretic. No erythema.  No - 110 mEq/L    POC Glucose 385 (H) 60 - 115 mg/dl    POC Creatinine 3.4 (H) 0.6 - 1.2 mg/dL    GFR Non-African American 13 (A) >60    GFR  16 (A) >60    Calcium, Ion 1.04 (L) 1.12 - 1.32 mmol/L    pH, Arterial 7.246 (L) 7.350 - 7.450    pCO2, Arterial 49 (H) 35 - 45 mm Hg    pO2, Arterial 516 (HH) 75 - 108 mm Hg    HCO3, Arterial 21.3 21.0 - 29.0 mmol/L    Base Excess, Arterial -6 (L) -3 - 3    O2 Sat, Arterial 100 (HH) 93 - 100 %    TCO2, Arterial 23 22 - 29    Lactate 9.14 (HH) 0.40 - 2.00 mmol/L    POC Hematocrit 27 (L) 36 - 48 %    Hemoglobin 9.0 (L) 12.0 - 16.0 gm/dL    FIO2 100.000     Sample Type ART     Performed on SEE BELOW    Troponin:         Lab Results   Component Value Date    TROPONINI 0.041 10/05/2020     EKG: normal sinus rhythm, lvh, LATERAL ST DEPRESSION       ASSESSMENT:     Status post cardiopulmonary arrest   Urosepsis/septic shock   Sinus bradycardia/sick sinus syndrome status post temporary transvenous pacemaker secondary to hyperkalemia versus other- resolved. History of hypertrophic obstructive cardiomyopathy   History of moderate mid LAD CAD. Vasospasm noted and improved with nitro. Acute on chronic kidney injury  History of anemia   Normal LV function   Hx of HTN   HLP   Hypokalemia. PLAN:   1. As always, aggressive risk factor modification is strongly recommended. We should adhere to the JNC VIII guidelines for HTN management and the NCEPATP III guidelines for LDL-C management. 2. Cont with Coreg and verapamil. Increase coreg to 12.5mg BID  3. Start Imdur 60mg daily  4. Avoid after load reducers such as ARB/ACE, hydralazine given HOCM  5. Ok for tele transfer  6. Avoid any nephrotoxic agents/nephrology recommendations  7. GI/DVT PROPH    Thank you for allowing me to participate in the care of your patient, please don't hesitate to contact me if you have any further questions.        Electronically signed by DO Nicolas on 10/12/2020 at 11:47 AM

## 2020-10-12 NOTE — FLOWSHEET NOTE
Pt.decreased appetite- doesn't want to eat breakfast. Denies any pain at time. Call light within reach.

## 2020-10-12 NOTE — PROCEDURES
PerfectServed Dr Shira Whitt about NM Kidney study. He asked me to get a hold of Dr Benjamin Masters to see when the study can be done. Dr Cassidy Sterling responded 10/13/2020 AM. No prep. IV needed. Questions?  Please call [facility] ext [phone]

## 2020-10-12 NOTE — PROGRESS NOTES
Nephrology Progress Note    Assessment:  SAÚL related to cardiac arrest 10/9/10 with hypotension and bradycardia resuscistated with pressors/intubation    OHDx Normal CAD Septal hypertrophy HCM  DM type-2  Hypertension uncontrolled  Hyperlipidemia  Anemia  * will renal scan in morning Prior kidney showed no HYDRO    Plan:try maintaining euvolemia state  With HCM avoid volume depletion and nitrates  Beta blocker fine  Watch for endocarditis signs.  Need to clarify with cardiology staus of septal issue increase coreg 25mg bid continue calan decrease imdur dose lasix drip with aldactone watch labs closely    Patient Active Problem List:     Hypertension     Hyperlipidemia     Uncontrolled type 2 diabetes mellitus with complication, without long-term current use of insulin (HCC)     Fibromyalgia     Anxiety     Smoker     Insomnia     DJD (degenerative joint disease), lumbar     Lumbosacral radiculopathy at S1     DDD (degenerative disc disease), lumbar     Dysphonia     CTS (carpal tunnel syndrome)     High risk medication use-Norco - 12/20/17 OARRS PM&R, 02/20/18 OARRS PM&R, 03/07/18 OARRS PM&R, Urine Drug screen negative 02/06/17 PM&R--MED CONTRACT 2/6/17     SOB (shortness of breath) on exertion     Chest pain     Memory deficit     Artificial lens present     Presbyopia     Astigmatism, regular     Cataract, nuclear sclerotic senile     IDDM (insulin dependent diabetes mellitus)     Regular astigmatism     Nuclear senile cataract     Neck pain     Lumbosacral radiculopathy at L5     Spinal stenosis of lumbar region with neurogenic claudication     Impaired mobility and activities of daily living     Non-compliant patient NO showed FU 1/10/17 Dr Glen Arellano     Insomnia secondary to chronic pain     Reactive depression     Diabetic radiculopathy (HCC)     Cervical radicular pain     Diabetic asymmetric polyneuropathy (HCC)     Myalgia     Intercostal neuropathy     Osteoarthritis of spine with radiculopathy, lumbar region     Acute combined systolic and diastolic CHF, NYHA class 1 (HCC)     PMB (postmenopausal bleeding)     Acute on chronic diastolic heart failure (HCC)     CHF (congestive heart failure) (HCC)     Hypertensive urgency     Uncontrolled type 2 diabetes mellitus with hyperglycemia (HCC)     Chronic obstructive pulmonary disease with acute exacerbation (HCC)     Acute on chronic diastolic (congestive) heart failure (HCC)     SAÚL (acute kidney injury) ([facility] [address]     Hypoglycemia     HOCM (hypertrophic obstructive cardiomyopathy) (HCC)     Gastrointestinal hemorrhage     COPD exacerbation (HCC)     Anemia     AVM (arteriovenous malformation)     Symptomatic anemia     Flash pulmonary edema (HCC)     Acute on chronic kidney failure (HCC)     Dysarthria     Chronic fatigue     Acute encephalopathy     Adenomatous polyp of sigmoid colon     Adenomatous polyp of transverse colon     Sepsis (HCC)     Major depressive disorder in remission ([facility] [address]     Gastroesophageal reflux disease without esophagitis     Acute cystitis without hematuria     CKD (chronic kidney disease) stage 4, GFR 15-29 ml/min ([facility])     Cardiopulmonary arrest ([facility] [address]      Subjective:  Admit Date: 10/5/2020    Interval History:daughter present told needs to get fluid off patient with edema  Seems SOB with rest. Dobutamine may not be choice with HCM    Medications:  Scheduled Meds:   carvedilol  12.5 mg Oral BID WC    isosorbide mononitrate  60 mg Oral Daily    sodium chloride  20 mL Intravenous Once    verapamil  120 mg Oral Nightly    cloNIDine  0.2 mg Oral BID    insulin lispro  0-6 Units Subcutaneous TID WC    insulin lispro  0-3 Units Subcutaneous Nightly    sodium chloride flush  10 mL Intravenous 2 times per day    heparin (porcine)  5,000 Units Subcutaneous 3 times per day    hydrocortisone sodium succinate PF  100 mg Intravenous Daily    pantoprazole  40 mg Intravenous Daily    And    sodium chloride (PF)  10 mL Intravenous Daily    piperacillin-tazobactam  2.25 g Intravenous Q8H    [Held by provider] digoxin  125 mcg Oral Daily    [Held by provider] PARoxetine  40 mg Oral QAM    rosuvastatin  40 mg Oral QPM     Continuous Infusions:   dextrose         CBC:   Recent Labs     10/11/20  0555 10/12/20  0245   WBC 12.6* 11.9*   HGB 8.3* 8.1*    161     CMP:    Recent Labs     10/10/20  0545 10/11/20  0557 10/12/20  0245    141 140   K 3.5 3.4 3.1*   CL 94* 97 99   CO2 29 26 24   BUN 37* 47* 51*   CREATININE 3.58* 4.01* 4.40*   GLUCOSE 259* 274* 264*   CALCIUM 6.3* 6.3* 6.9*   LABGLOM 12.4* 10.8* 9.7*     Troponin: No results for input(s): TROPONINI in the last 72 hours. BNP: No results for input(s): BNP in the last 72 hours. INR: No results for input(s): INR in the last 72 hours. Lipids: No results for input(s): CHOL, LDLDIRECT, TRIG, HDL, AMYLASE, LIPASE in the last 72 hours. Liver: No results for input(s): AST, ALT, ALKPHOS, PROT, LABALBU, BILITOT in the last 72 hours. Invalid input(s): BILDIR  Iron:  No results for input(s): IRONS, FERRITIN in the last 72 hours. Invalid input(s): LABIRONS  Urinalysis: No results for input(s): UA in the last 72 hours.     Objective:  Vitals: BP (!) 183/76   Pulse 73   Temp 98 °F (36.7 °C) (Oral)   Resp 18   Ht 4' 11\" (1.499 m)   Wt 160 lb 7.9 oz (72.8 kg)   LMP  (LMP Unknown)   SpO2 100%   BMI 32.42 kg/m²    Wt Readings from Last 3 Encounters:   10/10/20 160 lb 7.9 oz (72.8 kg)   09/29/20 130 lb (59 kg)   08/24/20 130 lb (59 kg)      24HR INTAKE/OUTPUT:      Intake/Output Summary (Last 24 hours) at 10/12/2020 9813  Last data filed at 10/12/2020 0838  Gross per 24 hour   Intake 830 ml   Output 1800 ml   Net -970 ml       General: alert, in no apparent distress  HEENT: normocephalic, atraumatic, anicteric  Neck: supple, no mass  Lungs: non-labored respirations, clear to auscultation bilaterally  Heart: regular rate and rhythm, 1/6 systolic murmurs or rubs  Abdomen: soft, non-tender, non-distended  Ext: no cyanosis, 1-2+ peripheral edema  Neuro: alert and oriented, no gross abnormalities  Psych: normal mood and affect  Skin: no rash      Electronically signed by Heriberto Sequeira MD

## 2020-10-12 NOTE — PROGRESS NOTES
CRITICAL CARE PROGRESS NOTES    PATIENT NAME: Patricia Ramos  MRN: [id number]  SERVICE DATE:  October 12, 2020   SERVICE TIME:  12:03 PM      PRIMARY SERVICE: Critical care medicine    CHIEF COMPLAINTS: Respiratory failure, sepsis    INTERVAL HPI: Patient seen and examined at bedside, Interval Notes, orders reviewed. Discussed on multidisciplinary rounds  Patient remained stable with no significant respiratory complaints or issues today. OBJECTIVE    Body mass index is 32.42 kg/m². PHYSICAL EXAM:  Vitals:  BP (!) 204/72   Pulse 72   Temp 98 °F (36.7 °C) (Oral)   Resp 20   Ht 4' 11\" (1.499 m)   Wt 160 lb 7.9 oz (72.8 kg)   LMP  (LMP Unknown)   SpO2 100%   BMI 32.42 kg/m²   General: Patient is currently alert, awake . comfortable in bed, No distress. Head: Atraumatic , Normocephalic   Eyes: PERRL. No icteric sclera. No conjunctival injection. No discharge   ENT: No nasal  discharge. Pharynx clear. Neck:  Trachea midline. No thyromegaly, no JVD, No cervical adenopathy. Chest : Increased spontaneous breathing effort, symmetric bilateral excursions  Lung : Mildly diminished breath sounds bilaterally, expiratory wheezes noted in the central upper airway region  Heart[de-identified] Regular rhythm and rate. No mumur ,  Rub or gallop  ABD: Benign. Non-tender. Non-distended. No masses or organmegaly. Normal bowel sounds. EXT: Trace pitting edema both lower extremities , No Cyanosis No clubbing  Neuro: no focal weakness  Skin: Warm and dry. No erythema or rash on exposed extremities.       DATA:   Recent Labs     10/11/20  0555 10/12/20  0245   WBC 12.6* 11.9*   HGB 8.3* 8.1*   HCT 26.0* 26.1*   MCV 76.8* 78.2*    161     Recent Labs     10/11/20  0557 10/12/20  0245    140   K 3.4 3.1*   CL 97 99   CO2 26 24   BUN 47* 51*   CREATININE 4.01* 4.40*   GLUCOSE 274* 264*   CALCIUM 6.3* 6.9*   LABGLOM 10.8* 9.7*   GFRAA 13.1* 11.8*       MV Settings:     Vent Mode: (S) CPAP  Vt Ordered: 280 mL  Rate Set: 12 bmp  FiO2 : 100 %  PEEP/CPAP: 5  Pressure Support: 5 cmH20  Peak Inspiratory Pressure: 12 cmH2O  Mean Airway Pressure: 7.7 cmH20  I:E Ratio: 1:5.20    Recent Labs     10/09/20  1441   PHART 7.500*   UIW0WWD 40   PO2ART 90*   GMO6TBW 30.8*   BEART 8*   [id number] 98*       O2 Device: Nasal cannula  O2 Flow Rate (L/min): 3 L/min    Dietary Nutrition Supplements: Diabetic Oral Supplement  DIET CARB CONTROL; Carb Control: 3 carb choices (45 gms)/meal; Low Sodium (2 GM)     MEDICATIONS during current hospitalization:    Continuous Infusions:   dextrose         Scheduled Meds:   carvedilol  12.5 mg Oral BID WC    isosorbide mononitrate  60 mg Oral Daily    sodium chloride  20 mL Intravenous Once    verapamil  120 mg Oral Nightly    cloNIDine  0.2 mg Oral BID    insulin lispro  0-6 Units Subcutaneous TID WC    insulin lispro  0-3 Units Subcutaneous Nightly    sodium chloride flush  10 mL Intravenous 2 times per day    heparin (porcine)  5,000 Units Subcutaneous 3 times per day    hydrocortisone sodium succinate PF  100 mg Intravenous Daily    pantoprazole  40 mg Intravenous Daily    And    sodium chloride (PF)  10 mL Intravenous Daily    piperacillin-tazobactam  2.25 g Intravenous Q8H    [Held by provider] digoxin  125 mcg Oral Daily    [Held by provider] PARoxetine  40 mg Oral QAM    rosuvastatin  40 mg Oral QPM       PRN Meds:magic (miracle) mouthwash, labetalol, potassium chloride, sodium chloride flush, promethazine **OR** ondansetron, ondansetron, dicyclomine, acetaminophen **OR** acetaminophen, glucose, dextrose, glucagon (rDNA), dextrose, iopamidol    Radiology  X-rays were all reviewed since admission            IMPRESSION AND SUGGESTION:  1. Status post cardiac arrest, recovered fully  2. Encephalopathy, resolved patient is fully alert and responds appropriately today  3. Acute on chronic hypoxic and hypercapnic respiratory failure, improved  4.  Septic shock which is related to urinary tract infection with E. Coli, resolved  5. Hypertension, continuing regular as well as PRN therapy per cardiology  6.  Acute kidney injury, on top of chronic kidney disease, continued worsening but still making adequate urine, followed by nephrology  Continue current management plans, transfer to the telemetry bed today    Electronically signed by Slade Villalobos MD, FCCP on 10/12/2020 at 12:03 PM

## 2020-10-12 NOTE — CONSULTS
Inpatient consult to GI  Consult performed by: Yuko Welch MD  Consult ordered by: Tamara Boss DO          Patient Name: Angie Colbert Date: 10/5/2020  9:54 PM  MR #: [id number]  : 1945    Attending Physician: Tamara Boss DO  Reason for consult: reported GIB    History of Presenting Illness:      Lilliam Garcias is a 67 y.o. female on hospital day 6 with a history of COPD-on home oxygen, CHF, fibromyalgia, hyperlipidemia, hypertension, osteoarthritis, DM 2, AMY. Past surgical history significant for back surgery, cardiac cath, multiple endoscopic procedures, hernia repair, carpal tunnel, and tonsillectomy. Family history was reviewed and is negative for GI malignancies. Social history patient denies any EtOH, illicit drug use, former nicotine use history Obtained From:  patient, family member - daughter, electronic medical record  GI consult for reported GI bleed-patient was originally admitted with fever chills diagnosed with sepsis and a UTI, hospital course has been complicated by a cardiac arrest on 10/7 wth prolonged CPR and  post arrest emergency PPM insertion. She was seen today on the regular medical floor she is neurologically intact and hemodynamically stable GI was asked to evaluate for possible GI bleeding. Patient is known to this service from previous admission in July and followed up accordingly as an outpatient with Dr. Sameer Lynch. Previously underwent an EGD with push enteroscopy which was negative, recently underwent a colonoscopy noted multiple colon polyps without any evidence of active bleed bleeding and was scheduled for an outpatient small bowel pill capsule endoscopy. Currently she is experiencing 5-10 loose watery stools daily, no overt blood, reported Hemoccult positive. Hemoglobin is 8.1, MCV 78.      History:      Past Medical History:   Diagnosis Date    Anxiety     Asthma     dx  / has smoked since age 11    CHF [facility]estive [facility] failure) [facility] [address]     Chronic back pain     Bilateral L5 S1 Radic on emg--surprisingly worse on the left than the right--pt's symptoms and her MRI show worse on the right    Chronic obstructive pulmonary disease with acute exacerbation (HCC) 10/12/2019    Depression     Fibromyalgia     Hyperlipidemia     meds > 8 yrs    Hypertension     meds > 45 yrs    On home O2     2l per n/c at bedtime mostly,     Osteoarthritis     Type II diabetes mellitus, uncontrolled ([address]     hx > 8 yrs    Unspecified sleep apnea      Past Surgical History:   Procedure Laterality Date    BACK SURGERY  2017    lumbar disc    CARDIAC CATHETERIZATION  11/3/14     DR. MIRELES / no stents    COLONOSCOPY  08/29/2016    w/polypectomy     COLONOSCOPY N/A 9/29/2020    COLONOSCOPY WITH POLYPECTOMY performed by Abhinav Krishna MD at [facility] N/A 10/7/2019    EUA HYSTEROSCOPY DILATATION AND CURETTAGE performed by Hannah Francois DO at [facility]. [address], COLON, DIAGNOSTIC      EYE SURGERY      [place] with IOL OU / [address] [place]  [id number]    umbilical hernia repair    MT ESOPHAGOGASTRODUODENOSCOPY TRANSORAL DIAGNOSTIC N/A 3/24/2017    EGD ESOPHAGOGASTRODUODENOSCOPY performed by Lynn Bueno MD at [place] [address] 2/8/2018    negative findings    MT REVISE MEDIAN N/CARPAL TUNNEL SURG Left 6/5/2017    LEFT  CARPAL TUNNEL RELEASE performed [facility] Diana Valdez MD at [facility] N/A 2/8/2018    TONSILLECTOMY      as child    UPPER GASTROINTESTINAL ENDOSCOPY  08/26/2016    w/bx     UPPER GASTROINTESTINAL ENDOSCOPY N/A 2/25/2020    EGD possible biopsy performed by Abhinav Krishna MD at [address] [place] [address] 7/1/2020    EGD PUSH ENTEROSCOPY performed by Yadiel Griffin MD at [facility]     Family History  Family History   Problem Relation Age of Onset    Heart Disease Father         cardiac bypass    Arthritis Father     Arthritis Mother     Other Mother          at age 80    Other Sister         [facility]    No Known Problems Daughter     Stroke Son      [] Unable to obtain due to ventilated and/ or neurologic status  Social History     Socioeconomic History    Marital status:      Spouse name: Not on file    Number of children: Not on file    Years of education: Not on file    Highest education level: Not on file   Occupational History    Occupation: Retired-   Social Needs    Financial resource strain: Not on file    Food insecurity     Worry: Not on file     Inability: Not on file   [employer] needs     Medical: Not on file     Non-medical: Not on file   Tobacco Use    Smoking status: Former Smoker     Packs/day: 1.00     Years: 59.00     Pack years: 59.00     Types: Cigarettes     Start date: 2017    Smokeless tobacco: Never Used    Tobacco comment: quit 2-3 weeks ago    Substance and Sexual Activity    Alcohol use: Not Currently    Drug use: No    Sexual activity: Yes   Lifestyle    Physical activity     Days per week: Not on file     Minutes per session: Not on file    Stress: Not on file   Relationships    Social connections     Talks on phone: Not on file     Gets together: Not on file     Attends Yarsani service: Not on file     Active member of club or organization: Not on file     Attends meetings of clubs or organizations: Not on file     Relationship status: Not on file    Intimate partner violence     Fear of current or ex partner: Not on file     Emotionally abused: Not on file     Physically abused: Not on file     Forced sexual activity: Not on file   Other Topics Concern    Not on file   Social History Narrative    Not on file      [] Unable to obtain due to ventilated and/ or neurologic status    Home Medications:      Medications Prior to Admission: insulin glargine (LANTUS) 100 UNIT/ML injection vial, 60 units at bedtime  insulin lispro (HUMALOG) 100 UNIT/ML injection vial, 20 units at each mels  ACCU-CHEK PHYLLIS PLUS strip, Test 3x daily Dx E11.65  rosuvastatin (CRESTOR) 40 MG tablet, Take 1 tablet by mouth every evening  carvedilol (COREG) 25 MG tablet, Take 2 tablets by mouth 2 times daily  verapamil (CALAN SR) 120 MG extended release tablet, Take 1 tablet by mouth nightly  PARoxetine (PAXIL) 40 MG tablet, TAKE 1 TABLET BY MOUTH ONCE DAILY IN THE MORNING (Patient taking differently: Take 40 mg by mouth every morning TAKE 1 TABLET BY MOUTH ONCE DAILY IN THE MORNING)  digoxin (LANOXIN) 125 MCG tablet, Take 1 tablet by mouth daily  hydrALAZINE (APRESOLINE) 100 MG tablet, Take 100 mg by mouth 2 times daily  pantoprazole (PROTONIX) 40 MG tablet, Take 40mg twice daily (1st dose 30min before breakfast) for 30 days.  After 30 days, you make take 40mg once daily before breakfast.  furosemide (LASIX) 40 MG tablet, Take 1 tablet by mouth daily  dicyclomine (BENTYL) 10 MG capsule, Take 1 capsule by mouth 4 times daily as needed (abdominal pain)  ondansetron (ZOFRAN) 4 MG tablet, Take 1 tablet by mouth every 8 hours as needed for Nausea or Vomiting    Current Hospital Medications:   Scheduled Meds:   carvedilol  12.5 mg Oral BID     isosorbide mononitrate  60 mg Oral Daily    sodium chloride  20 mL Intravenous Once    verapamil  120 mg Oral Nightly    cloNIDine  0.2 mg Oral BID    insulin lispro  0-6 Units Subcutaneous TID     insulin lispro  0-3 Units Subcutaneous Nightly    sodium chloride flush  10 mL Intravenous 2 times per day    heparin (porcine)  5,000 Units Subcutaneous 3 times per day    hydrocortisone sodium succinate PF  100 mg Intravenous Daily    pantoprazole  40 mg Intravenous Daily    And    sodium chloride (PF)  10 mL Intravenous Daily    piperacillin-tazobactam  2.25 g Intravenous Q8H    [Held by provider] digoxin  125 mcg Oral Daily    [Held by provider] PARoxetine limits. Moderate atherosclerotic plaquing of a normal caliber abdominal aorta and branch vessels is again noted. Minimal probable atelectasis is noted of the visualized lung bases. Moderately extensive degenerative changes of the thoracolumbar spine with grade 1 anterolisthesis of L4 over L5 is again noted. NO ACUTE INTRA-ABDOMINAL PROCESS OR SIGNIFICANT CHANGE FROM 8/24/2020 IDENTIFIED. Ct Head Wo Contrast    Result Date: 10/9/2020  EXAMINATION: CT of the brain without contrast HISTORY: Patient is unresponsive. Generalized weakness. Fever. COMPARISON: CT brain from October 7, 2020 TECHNIQUE: Multiple contiguous axial images were obtained of the brain from the skull base through the vertex. Multiplanar reformats were obtained. FINDINGS: Prominence of the sulci and ventricles compatible with mild generalized parenchymal volume loss. Areas of bilateral supratentorial white matter hypoattenuation are nonspecific but most likely related to chronic small vessel ischemic changes in a patient of this age. Remote left thalamic lacunar infarct. Remote posterior left frontal/parietal lacunar infarct Gray-white matter differentiation is preserved. No acute hemorrhage or abnormal extra-axial fluid collection. No mass effect or midline shift. The visualized paranasal sinuses and mastoid air cells are clear. Calvarium is intact. No acute intracranial process or significant interval change. All CT scans at this facility use dose modulation, iterative reconstruction, and/or weight based dosing when appropriate to reduce radiation dose to as low as reasonably achievable. Ct Head Wo Contrast    Result Date: 10/7/2020  CT Brain Contrast medium:  Not utilized. History:  Cardiac arrest Comparison:  CT brain, August 25, 2020, August 24, 2020. MRI brain, August 27, 2020. Findings: Extra-axial spaces:  Normal. Intracranial hemorrhage:  None. Ventricular system: Ventricles mildly enlarged. Sulci mildly prominent.  Basal Cisterns:  Normal. Cerebral Parenchyma: 4 mm area decreased attenuation exerting no mass effect left thalamus. Midline Shift:  None. Cerebellum:  Normal. Paranasal sinuses and mastoid air cells: Opacification bilateral ethmoid sinuses. Visualized Orbits:  Normal.     Impression: Mild cerebral atrophy. Remote left thalamic lacunar infarct. Ethmoid sinusitis. All CT scans at this facility use dose modulation, iterative reconstruction, and/or weight based dosing when appropriate to reduce radiation dose to as low as reasonably achievable. Xr Chest Portable    Result Date: 10/10/2020  EXAMINATION: XR CHEST PORTABLE. DATE AND TIME:10/9/2020 5:00 AM CLINICAL HISTORY: SHORTNESS OF BREATH  COMPARISONS: OCTOBER 8, 2020  FINDINGS: ET tube and nasogastric tube remain in satisfactory position. Persistent to increased basilar markings bilaterally with no significant change. NO CHANGE     Xr Chest Portable    Result Date: 10/8/2020  XR CHEST PORTABLE : 10/8/2020 CLINICAL HISTORY:  resp failure . COMPARISON: 10/7/2020. TECHNIQUE: A portable upright AP radiograph of the chest was obtained. FINDINGS: An endotracheal tube has been mildly withdrawn with its tip approximately 3 cm above the mojgan. There is been interval placement of an orogastric tube with its tip overlying the body of the stomach. Shallow inspiratory volumes are present with mild to moderate airspace infiltrates and bandlike opacities, predominantly of the mid to lower lung fields. A very small right pleural effusion is suggested. There is no significant left pleural effusion, pneumothorax, or displaced fractures identified. The heart remains mildly enlarged with mild vascular congestion, versus technique     ENDOTRACHEAL AND OROGASTRIC TUBES IN EXPECTED POSITION. POSSIBLE MILD CARDIAC DECOMPENSATION, WITH MILD TO MODERATE ATELECTASIS/EDEMA OF THE MID TO LOWER LUNG [place]. BRONCHOPNEUMONIA SHOULD BE EXCLUDED CLINICALLY.  SUSPECT VERY SMALL RIGHT PLEURAL EFFUSION. Xr Chest Portable    Result Date: 10/7/2020  EXAMINATION: CHEST PORTABLE VIEW  CLINICAL HISTORY: Intubation COMPARISONS: October 5, 2020  FINDINGS: Single  views of the chest is submitted. There is an endotracheal tube. The tip lies at the orifice of the right mainstem bronchus. Repositioning is recommended. The cardiac silhouette is enlarged. Pulmonary vascular unremarkable. Right sided trachea. No focal infiltrates. No Pneumothoraces. THE TIP OF THE ENDOTRACHEAL TUBE LIES AT THE ORIFICE THE RIGHT MAINSTEM BRONCHUS. REPOSITIONING RECOMMENDED    Xr Chest Portable    Result Date: 10/6/2020  EXAMINATION: XR CHEST PORTABLE CLINICAL HISTORY: SHORTNESS OF BREATH COMPARISONS: CT CHEST, AUGUST 25, 2020. CHEST RADIOGRAPHS, AUGUST 25, 2020, AUGUST 24, 2020, JUNE 29, 2020. FINDINGS: Osseous structures are intact. Cardiopericardial silhouette is enlarged and unchanged. Pulmonary vasculature is normal. Lungs are clear. STABLE CARDIOMEGALY. Ir Picc Wo Sq Port/pump > 5 Years    Result Date: 10/10/2020  PERIPHERALLY INSERTED CENTRAL CATHETER (PICC) PLACEMENT WITH ULTRASOUND GUIDANCE CLINICAL HISTORY:  REQUIRES INTRAVENOUS ACCESS After discussing the procedure and possible complications with the patient, informed consent was obtained. The patient was placed on the Special Procedures table. The right upper extremity was sterilely prepared using a maximal sterile barrier technique which includes cap, mask, sterile gown, sterile gloves, sterile full-body drape, hand hygiene and 2% chlorhexidine for cutaneous antisepsis. A sterile ultrasound technique with sterile gel and sterile probe covers was also utilized. A pre-procedure time out was performed in order to assure the correct patient and procedure. Local anesthetic was administered. A peripheral vein was accessed with sonographic guidance.   A sonographic spot image was obtained for documentation. A guidewire was advanced into the vein with fluoroscopic guidance and a sheath was placed over the guidewire. A 5-Yemeni dual-lumen PICC was advanced through the sheath, up the arm and into the central vasculature. It was positioned appropriately. The sheath was removed. The catheter was shown to aspirate and infuse properly. The flange of the catheter was affixed to the arm using a PICC securement device. A spot image of the chest showed the tip of the PICC line to lie in the superior vena cava. The patient tolerated the procedure well and without complications. Number of films: 4 Fluoroscopy time: 23.7 seconds CONCLUSION: SUCCESSFUL RIGHT PICC PLACEMENT WITHOUT IMMEDIATE COMPLICATIONS. Impression:   30-year-old -American female with UTI and sepsis, hospital course was complicated by cardiac arrest with prolonged CPR and intensive care treatment. Currently patient is on the RMF, alert and oriented, hemodynamically stable, reportedly had heme positive stool, she has chronic microcytic anemia- no overt bleeding, has had recent complete endoscopic investigation including push enteroscopy and colonoscopy which showed no evidence of any active source of blood loss, she was scheduled for an outpatient small bowel pill capsule in the future with Dr Anjali Kaur. Her anemia is likely multifactorial, no overt GI bleeding is noted. Of note patient does report 5-10 loose watery mucousy stools daily  Plan:   1. Continue supportive course of care  2. serial H&H trend and transfuse accordingly  3. Given her recent cardiac arrest and sepsis and the absence of overt GI bleeding, endoscopic investigation is not warranted at this time. This is an ongoing evaluation and endoscopic determination will be based on clinical course. 4. she should keep her follow-up as scheduled for an outpatient small bowel pill capsule endoscopy  5.   Recommend stool studies to rule out infectious origins    Comments: Thank you for allowing us to participate in the care of this patient. Will continue to follow. Please call if questions or concerns arise. A/P  Agree regarding assessment and plan. Reviewed chart and  examined patient. Noted patient had a recent cardiac arrest and sepsis in the absence of GI bleed. Noted patient had prior recent EGD/push enteroscopy and colonoscopy with no evidence or source of iron deficiency anemia. No AVMs noted. Patient was planned to have small bowel capsule endoscopy as outpatient. The anemia is multifactorial and  no overt bleeding at this time. The patient does endorses multiple watery stool. for which currently undergoing work-up and CDT evaluation. No indication for endoscopic intervention at this time. Continue supportive care. Riddhi Wick MD  Please note this report has been partially produced using speech recognition software and may cause contain errors related to that system including grammar, punctuation and spelling as well as words and phrases that may seem inappropriate. If there are questions or concerns please feel free to contact me to clarify.

## 2020-10-12 NOTE — PROGRESS NOTES
Mood and Affect: Mood normal.         Thought Content: Thought content normal.         Judgment: Judgment normal.         Review of Systems  14 point ROS is reviewed and negative  Medications:  Reviewed    Infusion Medications:    dextrose       Scheduled Medications:    sodium chloride  20 mL Intravenous Once    carvedilol  6.25 mg Oral BID WC    verapamil  120 mg Oral Nightly    cloNIDine  0.2 mg Oral BID    insulin lispro  0-6 Units Subcutaneous TID WC    insulin lispro  0-3 Units Subcutaneous Nightly    sodium chloride flush  10 mL Intravenous 2 times per day    heparin (porcine)  5,000 Units Subcutaneous 3 times per day    hydrocortisone sodium succinate PF  100 mg Intravenous Daily    pantoprazole  40 mg Intravenous Daily    And    sodium chloride (PF)  10 mL Intravenous Daily    piperacillin-tazobactam  2.25 g Intravenous Q8H    [Held by provider] digoxin  125 mcg Oral Daily    [Held by provider] PARoxetine  40 mg Oral QAM    rosuvastatin  40 mg Oral QPM     PRN Meds: magic (miracle) mouthwash, labetalol, potassium chloride, sodium chloride flush, promethazine **OR** ondansetron, ondansetron, dicyclomine, acetaminophen **OR** acetaminophen, glucose, dextrose, glucagon (rDNA), dextrose, iopamidol    Labs:   Recent Labs     10/10/20  0547 10/10/20  1744 10/11/20  0555 10/12/20  0245   WBC 13.7*  --  12.6* 11.9*   HGB 7.0* 8.4* 8.3* 8.1*   HCT 22.3* 26.4* 26.0* 26.1*     --  160 161     Recent Labs     10/10/20  0545 10/11/20  0557 10/12/20  0245    141 140   K 3.5 3.4 3.1*   CL 94* 97 99   CO2 29 26 24   BUN 37* 47* 51*   CREATININE 3.58* 4.01* 4.40*   CALCIUM 6.3* 6.3* 6.9*     No results for input(s): AST, ALT, BILIDIR, BILITOT, ALKPHOS in the last 72 hours. No results for input(s): INR in the last 72 hours. No results for input(s): Minh Palacios in the last 72 hours.     Urinalysis:   Lab Results   Component Value Date    NITRU POSITIVE 10/05/2020    WBCUA >100 10/05/2020 BACTERIA MANY 10/05/2020    RBCUA 20-50 10/05/2020    BLOODU LARGE 10/05/2020    SPECGRAV 1.015 10/05/2020    GLUCOSEU >=1000 10/05/2020       Radiology:   Most recent    Chest CT      WITH CONTRAST:No results found for this or any previous visit. WITHOUT CONTRAST:   Results for orders placed during the hospital encounter of 08/24/20   CT CHEST WO CONTRAST    Narrative EXAMINATION:  CT SCAN THE CHEST    CLINICAL HISTORY:  Short of breath    COMPARISON:  March 24, 2019    TECHNIQUE:  Multiple serial axial images of the chest from the base the neck through the upper abdomen with both sagittal coronal reconstruction was performed without intravenous or oral administration of contrast.    FINDINGS:    There is a patchy mosaic appearance lung parenchyma that can be seen with small airways disease. There is bibasilar areas of atelectasis, scarring. No focal infiltrates. No effusions no pneumothoraces. No significant periaortic adenopathy. There is pretracheal adenopathy. There is multilevel degenerative changes with osteophytes of the thoracic spine. Impression Unremarkable CT scan the chest as described above      All CT scans at this facility use dose modulation, iterative reconstruction, and/or weight based dosing when appropriate to reduce radiation dose to as low as reasonably achievable. Examination: CT ABDOMEN PELVIS WO CONTRAST, CT CHEST WO CONTRAST    Indication:   flank pain     Technique: Multiple serial axial images was performed through the abdomen and pelvis without intravenous or oral administration of contrast..   Images were reconstructed in the axial and coronal and sagittal planes. Comparison: September 9, 2019    Findings: The liver, gallbladder, spleen, pancreas, adrenals,  are unremarkable. The kidneys show no significant perinephric stranding. No nephrolithiasis. No hydronephrosis or hydroureter. No bladder calculi.     Large and small bowel show no sign of obstruction. The appendix is not visualized. No pericecal stranding. No diverticulitis. No free air. No free fluid. The visualized abdominal aorta is of normal size and caliber. No significant retroperitoneal adenopathy. There is multilevel degenerative changes of lumbar spine. There is a grade 1 anterolisthesis of L4 and L5. There is narrowing of the L5-S1 disc space. Impression: UNREMARKABLE CT SCAN OF THE ABDOMEN AND PELVIS AS DESCRIBED ABOVE      All CT scans at this facility use dose modulation, iterative reconstruction, and/or weight based dosing when appropriate to reduce radiation dose to as low as reasonably achievable. CXR      2-view:   Results for orders placed during the hospital encounter of 08/24/20   XR CHEST (2 VW)    Narrative EXAMINATION: XR CHEST (2 VW)     CLINICAL HISTORY:  sob     COMPARISONS: None     FINDINGS:    Two views of the chest are submitted. The cardiac silhouette is enlarged  Pulmonary vascular congestion with increased interstitial markings. Right sided trachea. No focal infiltrates. No effusions. No Pneumothoraces. Impression PULMONARY VASCULAR CONGESTION WITH INCREASED INTERSTITIAL MARKINGS. RADIOGRAPHIC FINDINGS COULD SUGGEST EARLY CHF. CHF. CORRELATE CLINICALLY        Portable:   Results for orders placed during the hospital encounter of 10/05/20   XR CHEST PORTABLE    Narrative EXAMINATION: XR CHEST PORTABLE. DATE AND TIME:10/9/2020 5:00 AM     CLINICAL HISTORY: SHORTNESS OF BREATH      COMPARISONS: OCTOBER 8, 2020     FINDINGS: ET tube and nasogastric tube remain in satisfactory position. Persistent to increased basilar markings bilaterally with no significant change. Impression NO CHANGE               Echo No results found for this or any previous visit.           Assessment/Plan:    Active Hospital Problems    Diagnosis Date Noted    Cardiopulmonary arrest [facility]) [I46.9]      Priority: High    Sepsis ([facility] [A41.9] 10/06/2020    Major depressive disorder in remission ([facility] [F32.5] 10/06/2020    Gastroesophageal reflux disease without esophagitis [K21.9] 10/06/2020    Acute cystitis without hematuria [N30.00] 10/06/2020    CKD (chronic kidney disease) stage 4, GFR 15-29 ml/min ([facility] [N18.4] 10/06/2020    Uncontrolled type 2 diabetes mellitus with hyperglycemia (HCC) [E11.65]     CHF (congestive heart failure) ([facility] [I50.9]     Hypertension [I10]     Hyperlipidemia [E78.5]      SAÚL with a history of CKD following cardiac: Nephrology following, lasix gtt, aldactone. Follow BMP daily. Avoid unnessescary nephrotoxin Agents. Hypertrophic cardiomyopathy: continue coreg BID, decrease imdur, lasix drip, spironolactone. Careful with diuresis to avoid hypovolemia. Type 2 diabetes:    Sepsis 2/2 E.coli UTI: resolved. Hypertensive urgency: PRN IV labetalol as needed. Clonidine 0.1 TID, coreg 25 BID, imdur 30, verapamil, aldactone. Dark tarry stools with microcytic anemia: GI consulted. No Cscope today, hx of push enteroscopy, awaiting pill cam outpatient. Additional work up or/and treatment plan may be added today or then after based on clinical progression. I am managing a portion of pt care. Some medical issues are handled byother specialists. Additional work up and treatment should be done in out pt setting by pt PCP and other out pt providers. In addition to examining and evaluating pt, I spent additional time explaining care, normaland abnormal findings, and treatment plan. All of pt questions were answered. Counseling, diet and education were provided. Case will be discussed with nursing staff when appropriate. Family will be updated if and whenappropriate.       Electronically signed by Tamara Boss DO on 10/12/2020 at 9:55 AM

## 2020-10-13 ENCOUNTER — APPOINTMENT (OUTPATIENT)
Dept: NUCLEAR MEDICINE | Age: 75
DRG: 871 | End: 2020-10-13
Payer: MEDICARE

## 2020-10-13 LAB
ALBUMIN SERPL-MCNC: 2.6 G/DL (ref 3.5–4.6)
ALP BLD-CCNC: 215 U/L (ref 40–130)
ALT SERPL-CCNC: 322 U/L (ref 0–33)
ANION GAP SERPL CALCULATED.3IONS-SCNC: 10 MEQ/L (ref 9–15)
ANION GAP SERPL CALCULATED.3IONS-SCNC: 14 MEQ/L (ref 9–15)
AST SERPL-CCNC: 168 U/L (ref 0–35)
BILIRUB SERPL-MCNC: 0.4 MG/DL (ref 0.2–0.7)
BLOOD BANK DISPENSE STATUS: NORMAL
BLOOD BANK DISPENSE STATUS: NORMAL
BLOOD BANK PRODUCT CODE: NORMAL
BLOOD BANK PRODUCT CODE: NORMAL
BPU ID: NORMAL
BPU ID: NORMAL
BUN BLDV-MCNC: 47 MG/DL (ref 8–23)
BUN BLDV-MCNC: 48 MG/DL (ref 8–23)
CALCIUM SERPL-MCNC: 7.2 MG/DL (ref 8.5–9.9)
CALCIUM SERPL-MCNC: 7.3 MG/DL (ref 8.5–9.9)
CHLORIDE BLD-SCNC: 94 MEQ/L (ref 95–107)
CHLORIDE BLD-SCNC: 97 MEQ/L (ref 95–107)
CO2: 24 MEQ/L (ref 20–31)
CO2: 27 MEQ/L (ref 20–31)
CREAT SERPL-MCNC: 3.8 MG/DL (ref 0.5–0.9)
CREAT SERPL-MCNC: 3.81 MG/DL (ref 0.5–0.9)
DESCRIPTION BLOOD BANK: NORMAL
DESCRIPTION BLOOD BANK: NORMAL
GFR AFRICAN AMERICAN: 13.9
GFR AFRICAN AMERICAN: 14
GFR NON-AFRICAN AMERICAN: 11.5
GFR NON-AFRICAN AMERICAN: 11.5
GLOBULIN: 2.1 G/DL (ref 2.3–3.5)
GLUCOSE BLD-MCNC: 201 MG/DL (ref 60–115)
GLUCOSE BLD-MCNC: 243 MG/DL (ref 70–99)
GLUCOSE BLD-MCNC: 257 MG/DL (ref 60–115)
GLUCOSE BLD-MCNC: 364 MG/DL (ref 60–115)
GLUCOSE BLD-MCNC: 440 MG/DL (ref 60–115)
GLUCOSE BLD-MCNC: 460 MG/DL (ref 70–99)
HBA1C MFR BLD: 9.2 % (ref 4.8–5.9)
HCT VFR BLD CALC: 22.6 % (ref 37–47)
HCT VFR BLD CALC: 22.6 % (ref 37–47)
HCT VFR BLD CALC: 24 % (ref 37–47)
HCT VFR BLD CALC: 24.3 % (ref 37–47)
HEMOGLOBIN: 7.3 G/DL (ref 12–16)
HEMOGLOBIN: 7.4 G/DL (ref 12–16)
HEMOGLOBIN: 7.7 G/DL (ref 12–16)
HEMOGLOBIN: 7.8 G/DL (ref 12–16)
MAGNESIUM: 2 MG/DL (ref 1.7–2.4)
MAGNESIUM: 2.1 MG/DL (ref 1.7–2.4)
MCH RBC QN AUTO: 25.7 PG (ref 27–31.3)
MCH RBC QN AUTO: 26.2 PG (ref 27–31.3)
MCHC RBC AUTO-ENTMCNC: 32.4 % (ref 33–37)
MCHC RBC AUTO-ENTMCNC: 32.5 % (ref 33–37)
MCV RBC AUTO: 79.4 FL (ref 82–100)
MCV RBC AUTO: 80.4 FL (ref 82–100)
PDW BLD-RTO: 21.6 % (ref 11.5–14.5)
PDW BLD-RTO: 21.9 % (ref 11.5–14.5)
PERFORMED ON: ABNORMAL
PLATELET # BLD: 133 K/UL (ref 130–400)
PLATELET # BLD: 143 K/UL (ref 130–400)
POTASSIUM REFLEX MAGNESIUM: 3.3 MEQ/L (ref 3.4–4.9)
POTASSIUM REFLEX MAGNESIUM: 3.5 MEQ/L (ref 3.4–4.9)
POTASSIUM SERPL-SCNC: 3.5 MEQ/L (ref 3.4–4.9)
RBC # BLD: 2.85 M/UL (ref 4.2–5.4)
RBC # BLD: 2.98 M/UL (ref 4.2–5.4)
SODIUM BLD-SCNC: 131 MEQ/L (ref 135–144)
SODIUM BLD-SCNC: 135 MEQ/L (ref 135–144)
TOTAL PROTEIN: 4.7 G/DL (ref 6.3–8)
WBC # BLD: 11.7 K/UL (ref 4.8–10.8)
WBC # BLD: 8.9 K/UL (ref 4.8–10.8)

## 2020-10-13 PROCEDURE — 6360000002 HC RX W HCPCS: Performed by: INTERNAL MEDICINE

## 2020-10-13 PROCEDURE — 99231 SBSQ HOSP IP/OBS SF/LOW 25: CPT | Performed by: NURSE PRACTITIONER

## 2020-10-13 PROCEDURE — 97535 SELF CARE MNGMENT TRAINING: CPT

## 2020-10-13 PROCEDURE — 2060000000 HC ICU INTERMEDIATE R&B

## 2020-10-13 PROCEDURE — 3430000000 HC RX DIAGNOSTIC RADIOPHARMACEUTICAL: Performed by: INTERNAL MEDICINE

## 2020-10-13 PROCEDURE — 6370000000 HC RX 637 (ALT 250 FOR IP): Performed by: INTERNAL MEDICINE

## 2020-10-13 PROCEDURE — 83036 HEMOGLOBIN GLYCOSYLATED A1C: CPT

## 2020-10-13 PROCEDURE — 99213 OFFICE O/P EST LOW 20 MIN: CPT

## 2020-10-13 PROCEDURE — A9562 TC99M MERTIATIDE: HCPCS | Performed by: INTERNAL MEDICINE

## 2020-10-13 PROCEDURE — 36415 COLL VENOUS BLD VENIPUNCTURE: CPT

## 2020-10-13 PROCEDURE — C9113 INJ PANTOPRAZOLE SODIUM, VIA: HCPCS | Performed by: INTERNAL MEDICINE

## 2020-10-13 PROCEDURE — 94150 VITAL CAPACITY TEST: CPT

## 2020-10-13 PROCEDURE — 85027 COMPLETE CBC AUTOMATED: CPT

## 2020-10-13 PROCEDURE — 2580000003 HC RX 258: Performed by: INTERNAL MEDICINE

## 2020-10-13 PROCEDURE — 2700000000 HC OXYGEN THERAPY PER DAY

## 2020-10-13 PROCEDURE — 99232 SBSQ HOSP IP/OBS MODERATE 35: CPT | Performed by: INTERNAL MEDICINE

## 2020-10-13 PROCEDURE — 78708 K FLOW/FUNCT IMAGE W/DRUG: CPT

## 2020-10-13 PROCEDURE — P9016 RBC LEUKOCYTES REDUCED: HCPCS

## 2020-10-13 PROCEDURE — 80053 COMPREHEN METABOLIC PANEL: CPT

## 2020-10-13 PROCEDURE — 94667 MNPJ CHEST WALL 1ST: CPT

## 2020-10-13 PROCEDURE — 83735 ASSAY OF MAGNESIUM: CPT

## 2020-10-13 PROCEDURE — 36430 TRANSFUSION BLD/BLD COMPNT: CPT

## 2020-10-13 PROCEDURE — 85018 HEMOGLOBIN: CPT

## 2020-10-13 PROCEDURE — 85014 HEMATOCRIT: CPT

## 2020-10-13 PROCEDURE — 99222 1ST HOSP IP/OBS MODERATE 55: CPT | Performed by: INTERNAL MEDICINE

## 2020-10-13 RX ORDER — NICOTINE POLACRILEX 4 MG
15 LOZENGE BUCCAL PRN
Status: DISCONTINUED | OUTPATIENT
Start: 2020-10-13 | End: 2020-10-20 | Stop reason: HOSPADM

## 2020-10-13 RX ORDER — DEXTROSE MONOHYDRATE 25 G/50ML
12.5 INJECTION, SOLUTION INTRAVENOUS PRN
Status: DISCONTINUED | OUTPATIENT
Start: 2020-10-13 | End: 2020-10-20 | Stop reason: HOSPADM

## 2020-10-13 RX ORDER — POTASSIUM CHLORIDE 7.45 MG/ML
10 INJECTION INTRAVENOUS
Status: COMPLETED | OUTPATIENT
Start: 2020-10-13 | End: 2020-10-13

## 2020-10-13 RX ORDER — POTASSIUM CHLORIDE 20 MEQ/1
40 TABLET, EXTENDED RELEASE ORAL ONCE
Status: COMPLETED | OUTPATIENT
Start: 2020-10-13 | End: 2020-10-13

## 2020-10-13 RX ORDER — DEXTROSE MONOHYDRATE 50 MG/ML
100 INJECTION, SOLUTION INTRAVENOUS PRN
Status: DISCONTINUED | OUTPATIENT
Start: 2020-10-13 | End: 2020-10-20 | Stop reason: HOSPADM

## 2020-10-13 RX ORDER — CLONIDINE HYDROCHLORIDE 0.1 MG/1
0.2 TABLET ORAL 3 TIMES DAILY
Status: DISCONTINUED | OUTPATIENT
Start: 2020-10-13 | End: 2020-10-20 | Stop reason: HOSPADM

## 2020-10-13 RX ORDER — ISOSORBIDE MONONITRATE 60 MG/1
60 TABLET, EXTENDED RELEASE ORAL DAILY
Status: DISCONTINUED | OUTPATIENT
Start: 2020-10-14 | End: 2020-10-20 | Stop reason: HOSPADM

## 2020-10-13 RX ORDER — CEFUROXIME AXETIL 500 MG/1
500 TABLET ORAL EVERY 12 HOURS SCHEDULED
Status: DISCONTINUED | OUTPATIENT
Start: 2020-10-13 | End: 2020-10-14

## 2020-10-13 RX ORDER — INSULIN GLARGINE 100 [IU]/ML
50 INJECTION, SOLUTION SUBCUTANEOUS NIGHTLY
Status: DISCONTINUED | OUTPATIENT
Start: 2020-10-13 | End: 2020-10-15

## 2020-10-13 RX ORDER — LIDOCAINE HYDROCHLORIDE 20 MG/ML
15 SOLUTION OROPHARYNGEAL
Status: DISCONTINUED | OUTPATIENT
Start: 2020-10-13 | End: 2020-10-20 | Stop reason: HOSPADM

## 2020-10-13 RX ORDER — FUROSEMIDE 10 MG/ML
20 INJECTION INTRAMUSCULAR; INTRAVENOUS ONCE
Status: COMPLETED | OUTPATIENT
Start: 2020-10-13 | End: 2020-10-13

## 2020-10-13 RX ORDER — CLONIDINE HYDROCHLORIDE 0.1 MG/1
0.2 TABLET ORAL 2 TIMES DAILY
Status: DISCONTINUED | OUTPATIENT
Start: 2020-10-13 | End: 2020-10-13

## 2020-10-13 RX ORDER — SODIUM CHLORIDE 0.9 % (FLUSH) 0.9 %
10 SYRINGE (ML) INJECTION PRN
Status: DISCONTINUED | OUTPATIENT
Start: 2020-10-13 | End: 2020-10-20 | Stop reason: HOSPADM

## 2020-10-13 RX ADMIN — SODIUM CHLORIDE 20 ML: 9 INJECTION, SOLUTION INTRAVENOUS at 01:07

## 2020-10-13 RX ADMIN — FUROSEMIDE 5 MG/HR: 10 INJECTION, SOLUTION INTRAVENOUS at 08:43

## 2020-10-13 RX ADMIN — HYDROCORTISONE SODIUM SUCCINATE 100 MG: 100 INJECTION, POWDER, FOR SOLUTION INTRAMUSCULAR; INTRAVENOUS at 08:44

## 2020-10-13 RX ADMIN — Medication 10 ML: at 10:05

## 2020-10-13 RX ADMIN — POTASSIUM CHLORIDE 10 MEQ: 7.46 INJECTION, SOLUTION INTRAVENOUS at 13:13

## 2020-10-13 RX ADMIN — PIPERACILLIN AND TAZOBACTAM 2.25 G: 2; .25 INJECTION, POWDER, LYOPHILIZED, FOR SOLUTION INTRAVENOUS at 03:48

## 2020-10-13 RX ADMIN — ROSUVASTATIN CALCIUM 40 MG: 40 TABLET, FILM COATED ORAL at 21:43

## 2020-10-13 RX ADMIN — CLONIDINE HYDROCHLORIDE 0.2 MG: 0.1 TABLET ORAL at 21:33

## 2020-10-13 RX ADMIN — CLONIDINE HYDROCHLORIDE 0.1 MG: 0.1 TABLET ORAL at 04:00

## 2020-10-13 RX ADMIN — ACETAMINOPHEN 650 MG: 325 TABLET, FILM COATED ORAL at 21:43

## 2020-10-13 RX ADMIN — Medication 10 ML: at 09:48

## 2020-10-13 RX ADMIN — ISOSORBIDE MONONITRATE 30 MG: 30 TABLET, EXTENDED RELEASE ORAL at 08:44

## 2020-10-13 RX ADMIN — PANTOPRAZOLE SODIUM 40 MG: 40 INJECTION, POWDER, FOR SOLUTION INTRAVENOUS at 08:44

## 2020-10-13 RX ADMIN — POTASSIUM CHLORIDE 40 MEQ: 20 TABLET, EXTENDED RELEASE ORAL at 08:44

## 2020-10-13 RX ADMIN — FUROSEMIDE 20 MG: 10 INJECTION, SOLUTION INTRAVENOUS at 10:04

## 2020-10-13 RX ADMIN — Medication 11.3 MILLICURIE: at 09:48

## 2020-10-13 RX ADMIN — CLONIDINE HYDROCHLORIDE 0.2 MG: 0.1 TABLET ORAL at 16:19

## 2020-10-13 RX ADMIN — INSULIN LISPRO 18 UNITS: 100 INJECTION, SOLUTION INTRAVENOUS; SUBCUTANEOUS at 16:56

## 2020-10-13 RX ADMIN — Medication 10 ML: at 21:33

## 2020-10-13 RX ADMIN — INSULIN GLARGINE 50 UNITS: 100 INJECTION, SOLUTION SUBCUTANEOUS at 21:44

## 2020-10-13 RX ADMIN — Medication 10 ML: at 08:45

## 2020-10-13 RX ADMIN — VERAPAMIL HYDROCHLORIDE 120 MG: 240 TABLET, FILM COATED, EXTENDED RELEASE ORAL at 21:33

## 2020-10-13 RX ADMIN — CEFUROXIME AXETIL 500 MG: 500 TABLET ORAL at 12:20

## 2020-10-13 RX ADMIN — SPIRONOLACTONE 25 MG: 25 TABLET, FILM COATED ORAL at 08:44

## 2020-10-13 RX ADMIN — POTASSIUM CHLORIDE 10 MEQ: 7.46 INJECTION, SOLUTION INTRAVENOUS at 12:11

## 2020-10-13 RX ADMIN — CARVEDILOL 25 MG: 25 TABLET, FILM COATED ORAL at 16:19

## 2020-10-13 RX ADMIN — CARVEDILOL 25 MG: 25 TABLET, FILM COATED ORAL at 08:44

## 2020-10-13 RX ADMIN — CEFUROXIME AXETIL 500 MG: 500 TABLET ORAL at 21:43

## 2020-10-13 RX ADMIN — Medication 10 ML: at 08:44

## 2020-10-13 ASSESSMENT — PAIN SCALES - GENERAL
PAINLEVEL_OUTOF10: 3
PAINLEVEL_OUTOF10: 0

## 2020-10-13 NOTE — PROGRESS NOTES
INPATIENT PROGRESS NOTES    PATIENT NAME: Tylor Padilla  MRN: [id number]  SERVICE DATE:  2020   SERVICE TIME:  10:32 AM      PRIMARY SERVICE: Pulmonary Disease    CHIEF COMPLAINTS: Post cardiac arrest    INTERVAL HPI: Patient seen and examined at bedside, Interval Notes, orders reviewed. Nursing notes noted    Patient report feeling better, still has shortness of breath, mild chest pain anteriorly, slept well, no nausea no vomiting no fever. Review of system:     GI Abdominal pain No  Skin Rash No    Social History     Tobacco Use    Smoking status: Former Smoker     Packs/day: 1.00     Years: 59.00     Pack years: 59.00     Types: Cigarettes     Start date: 2017    Smokeless tobacco: Never Used    Tobacco comment: quit 2-3 weeks ago    Substance Use Topics    Alcohol use: Not Currently         Problem Relation Age of Onset    Heart Disease Father         cardiac bypass    Arthritis Father     Arthritis Mother     Other Mother          at age 80    Other Sister         [facility]    No Known Problems Daughter     Stroke Son          OBJECTIVE    Body mass index is 32.42 kg/m². PHYSICAL EXAM:  Vitals:  BP (!) 182/69   Pulse 69   Temp 97.5 °F (36.4 °C) (Oral)   Resp 19   Ht 4' 11\" (1.499 m)   Wt 160 lb 7.9 oz (72.8 kg)   LMP  (LMP Unknown)   SpO2 100%   BMI 32.42 kg/m²     General: alert, cooperative, no distress  Head: normocephalic, atraumatic  Eyes:No gross abnormalities. ENT:  MMM no lesions  Neck:  supple and no masses  Chest : Minimal basal rales, no wheezing, good air movement, nontender, tympanic  Heart[de-identified] Heart sounds are normal.  Regular rate and rhythm without murmur, gallop or rub. ABD:  symmetric, soft, non-tender  Musculoskeletal : no cyanosis, no clubbing and no edema  Neuro:  Grossly normal  Skin: No rashes or nodules noted.   Lymph node:  no cervical nodes  Urology: No Sanchez   Psychiatric: appropriate    DATA:   Recent Labs     10/12/20  5146 10/12/20  2030 10/13/20  0400   WBC 11.9*  --  8.9   HGB 8.1* 6.4* 7.8*   HCT 26.1* 20.0* 24.0*   MCV 78.2*  --  80.4*     --  133     Recent Labs     10/12/20  0245 10/13/20  0400    135   K 3.1* 3.3*   CL 99 97   CO2 24 24   BUN 51* 48*   CREATININE 4.40* 3.81*   GLUCOSE 264* 460*   CALCIUM 6.9* 7.2*   LABGLOM 9.7* 11.5*   GFRAA 11.8* 13.9*       MV Settings:     Vent Mode: (S) CPAP  Vt Ordered: 280 mL  Rate Set: 12 bmp  FiO2 : 100 %  PEEP/CPAP: 5  Pressure Support: 5 cmH20  Peak Inspiratory Pressure: 12 cmH2O  Mean Airway Pressure: 7.7 cmH20  I:E Ratio: 1:5.20    No results for input(s): PHART, HUW1ARX, PO2ART, AJQ1PHW, BEART, T4PWDZSY in the last 72 hours.     O2 Device: Nasal cannula  O2 Flow Rate (L/min): 3 L/min    Dietary Nutrition Supplements: Diabetic Oral Supplement  DIET CARB CONTROL; Carb Control: 3 carb choices (45 gms)/meal; Low Sodium (2 GM)     MEDICATIONS during current hospitalization:    Continuous Infusions:   dextrose      furosemide (LASIX) 1mg/ml infusion 5 mg/hr (10/13/20 0843)    dextrose         Scheduled Meds:   potassium chloride  10 mEq Intravenous Q1H    cloNIDine  0.2 mg Oral BID    insulin lispro  10 Units Subcutaneous TID     insulin lispro  0-12 Units Subcutaneous TID WC    insulin lispro  0-6 Units Subcutaneous Nightly    furosemide  20 mg Intravenous Once    isosorbide mononitrate  30 mg Oral Daily    carvedilol  25 mg Oral BID WC    spironolactone  25 mg Oral Daily    insulin glargine  15 Units Subcutaneous Nightly    sodium chloride  20 mL Intravenous Once    verapamil  120 mg Oral Nightly    sodium chloride flush  10 mL Intravenous 2 times per day    heparin (porcine)  5,000 Units Subcutaneous 3 times per day    pantoprazole  40 mg Intravenous Daily    And    sodium chloride (PF)  10 mL Intravenous Daily    piperacillin-tazobactam  2.25 g Intravenous Q8H    [Held by provider] PARoxetine  40 mg Oral QAM    rosuvastatin  40 mg Oral QPM PRN Meds:glucose, dextrose, glucagon (rDNA), dextrose, sodium chloride flush, magic (miracle) mouthwash, labetalol, potassium chloride, sodium chloride flush, promethazine **OR** ondansetron, ondansetron, acetaminophen **OR** acetaminophen, glucose, dextrose, glucagon (rDNA), dextrose, iopamidol    Radiology  Ct Abdomen Pelvis Wo Contrast Additional Contrast? None    Result Date: 10/6/2020  CT ABDOMEN PELVIS WO CONTRAST: 10/5/2020 CLINICAL HISTORY:  recent colonoscopy, fever, vomiting, abd pain . COMPARISON: 8/24/2020. TECHNIQUE: Spiral images were obtained of the abdomen and pelvis without contrast. All CT scans at this facility use dose modulation, iterative reconstruction, and/or weight based dosing when appropriate to reduce radiation dose to as low as reasonably achievable. FINDINGS: A moderate amount of stool is present within the colon and rectum. There is no abnormal small bowel dilatation, significant inflammation, ascites, lymphadenopathy is, or other significant change from the prior study identified. The unenhanced liver, gallbladder, pancreas, spleen, adrenal glands, kidneys, uterus, adnexa, urinary bladder, and additional images of pelvis appear within normal limits. Moderate atherosclerotic plaquing of a normal caliber abdominal aorta and branch vessels is again noted. Minimal probable atelectasis is noted of the visualized lung bases. Moderately extensive degenerative changes of the thoracolumbar spine with grade 1 anterolisthesis of L4 over L5 is again noted. NO ACUTE INTRA-ABDOMINAL PROCESS OR SIGNIFICANT CHANGE FROM 8/24/2020 IDENTIFIED. Ct Head Wo Contrast    Result Date: 10/9/2020  EXAMINATION: CT of the brain without contrast HISTORY: Patient is unresponsive. Generalized weakness. Fever. COMPARISON: CT brain from October 7, 2020 TECHNIQUE: Multiple contiguous axial images were obtained of the brain from the skull base through the vertex. Multiplanar reformats were obtained. FINDINGS: Prominence of the sulci and ventricles compatible with mild generalized parenchymal volume loss. Areas of bilateral supratentorial white matter hypoattenuation are nonspecific but most likely related to chronic small vessel ischemic changes in a patient of this age. Remote left thalamic lacunar infarct. Remote posterior left frontal/parietal lacunar infarct Gray-white matter differentiation is preserved. No acute hemorrhage or abnormal extra-axial fluid collection. No mass effect or midline shift. The visualized paranasal sinuses and mastoid air cells are clear. Calvarium is intact. No acute intracranial process or significant interval change. All CT scans at this facility use dose modulation, iterative reconstruction, and/or weight based dosing when appropriate to reduce radiation dose to as low as reasonably achievable. Ct Head Wo Contrast    Result Date: 10/7/2020  CT Brain Contrast medium:  Not utilized. History:  Cardiac arrest Comparison:  CT brain, August 25, 2020, August 24, 2020. MRI brain, August 27, 2020. Findings: Extra-axial spaces:  Normal. Intracranial hemorrhage:  None. Ventricular system: Ventricles mildly enlarged. Sulci mildly prominent. Basal Cisterns:  Normal. Cerebral Parenchyma: 4 mm area decreased attenuation exerting no mass effect left thalamus. Midline Shift:  None. Cerebellum:  Normal. Paranasal sinuses and mastoid air cells: Opacification bilateral ethmoid sinuses. Visualized Orbits:  Normal.     Impression: Mild cerebral atrophy. Remote left thalamic lacunar infarct. Ethmoid sinusitis. All CT scans at this facility use dose modulation, iterative reconstruction, and/or weight based dosing when appropriate to reduce radiation dose to as low as reasonably achievable. Xr Chest Portable    Result Date: 10/10/2020  EXAMINATION: XR CHEST PORTABLE.  DATE AND TIME:10/9/2020 5:00 AM CLINICAL HISTORY: SHORTNESS OF BREATH  COMPARISONS: OCTOBER 8, 2020  FINDINGS: ET tube and nasogastric tube remain in satisfactory position. Persistent to increased basilar markings bilaterally with no significant change. NO CHANGE     Xr Chest Portable    Result Date: 10/8/2020  XR CHEST PORTABLE : 10/8/2020 CLINICAL HISTORY:  resp failure . COMPARISON: 10/7/2020. TECHNIQUE: A portable upright AP radiograph of the chest was obtained. FINDINGS: An endotracheal tube has been mildly withdrawn with its tip approximately 3 cm above the mojgan. There is been interval placement of an orogastric tube with its tip overlying the body of the stomach. Shallow inspiratory volumes are present with mild to moderate airspace infiltrates and bandlike opacities, predominantly of the mid to lower lung fields. A very small right pleural effusion is suggested. There is no significant left pleural effusion, pneumothorax, or displaced fractures identified. The heart remains mildly enlarged with mild vascular congestion, versus technique     ENDOTRACHEAL AND OROGASTRIC TUBES IN EXPECTED POSITION. POSSIBLE MILD CARDIAC DECOMPENSATION, WITH MILD TO MODERATE ATELECTASIS/EDEMA OF THE MID TO LOWER LUNG H[place]ILTON. BRONCHOPNEUMONIA SHOULD BE EXCLUDED CLINICALLY. SUSPECT VERY SMALL RIGHT PLEURAL EFFUSION. Xr Chest Portable    Result Date: 10/7/2020  EXAMINATION: CHEST PORTABLE VIEW  CLINICAL HISTORY: Intubation COMPARISONS: October 5, 2020  FINDINGS: Single  views of the chest is submitted. There is an endotracheal tube. The tip lies at the orifice of the right mainstem bronchus. Repositioning is recommended. The cardiac silhouette is enlarged. Pulmonary vascular unremarkable. Right sided trachea. No focal infiltrates. No Pneumothoraces. THE TIP OF THE ENDOTRACHEAL TUBE LIES AT THE ORIFICE THE RIGHT MAINSTEM BRONCHUS.  REPOSITIONING RECOMMENDED    Xr Chest Portable    Result Date: 10/6/2020  EXAMINATION: XR CHEST PORTABLE CLINICAL HISTORY: SHORTNESS OF BREATH COMPARISONS: CT CHEST, AUGUST 25, 2020. CHEST RADIOGRAPHS, AUGUST 25, 2020, AUGUST 24, 2020, JUNE 29, 2020. FINDINGS: Osseous structures are intact. Cardiopericardial silhouette is enlarged and unchanged. Pulmonary vasculature is normal. Lungs are clear. STABLE CARDIOMEGALY. Ir Picc Wo Sq Port/pump > 5 Years    Result Date: 10/10/2020  PERIPHERALLY INSERTED CENTRAL CATHETER (PICC) PLACEMENT WITH ULTRASOUND GUIDANCE CLINICAL HISTORY:  REQUIRES INTRAVENOUS ACCESS After discussing the procedure and possible complications with the patient, informed consent was obtained. The patient was placed on the Special Procedures table. The right upper extremity was sterilely prepared using a maximal sterile barrier technique which includes cap, mask, sterile gown, sterile gloves, sterile full-body drape, hand hygiene and 2% chlorhexidine for cutaneous antisepsis. A sterile ultrasound technique with sterile gel and sterile probe covers was also utilized. A pre-procedure time out was performed in order to assure the correct patient and procedure. Local anesthetic was administered. A peripheral vein was accessed with sonographic guidance. A sonographic spot image was obtained for documentation. A guidewire was advanced into the vein with fluoroscopic guidance and a sheath was placed over the guidewire. A 5-[place] dual-lumen PICC was advanced through the sheath, up the arm and into the central vasculature. It was positioned appropriately. The sheath was removed. The catheter was shown to aspirate and infuse properly. The flange of the catheter was affixed to the arm using a PICC securement device. A spot image of the chest showed the tip of the PICC line to lie in the superior vena cava. The patient tolerated the procedure well and without complications. Number of films: 4 Fluoroscopy time: 23.7 seconds CONCLUSION: SUCCESSFUL RIGHT PICC PLACEMENT WITHOUT IMMEDIATE COMPLICATIONS.              IMPRESSION AND SUGGESTION:  Patient is at risk due to   · Status post cardiac arrest, recovered completely  · Post cardiac arrest encephalopathy,  · Acute on chronic hypoxic and hypercapnic respiratory failure secondary to #1 currently at baseline  · Septic shock due to UTI, and resolved  · SAÚL, improved  · Hyperglycemia    Recommendation  · Pulmonary hygiene  · Watch volume status avoid overload  · Incentive spirometry and flutter valve  · Change to oral antibiotic  · Physical therapy  · Patient will need rehab  · She will need home O2 eval prior to discharge  · Further work-up as outpatient including PFT and sleep study for the chronic hypercapnia      Electronically signed by Levar Mae MD,  FCCP   on 10/13/2020 at 10:32 AM

## 2020-10-13 NOTE — PROGRESS NOTES
Gastroenterology Progress Note    Waldo Orourke is a 67 y.o. female patient. Hospitalization Day:7    Chief C/O: Heme positive stool    SUBJECTIVE: Patient was seen and examined on the regular medical floor, hemodynamically stable, persistent loose watery stools, stool studies are pending, has no overt bleeding, hemoglobin is 7.8    ROS:  Gastrointestinal ROS: no abdominal pain, change in bowel habits, or black or bloody stools    Physical    VITALS:  BP (!) 182/69   Pulse 69   Temp 97.5 °F (36.4 °C) (Oral)   Resp 19   Ht 4' 11\" (1.499 m)   Wt 160 lb 7.9 oz (72.8 kg)   LMP  (LMP Unknown)   SpO2 100%   BMI 32.42 kg/m²   TEMPERATURE:  Current - Temp: 97.5 °F (36.4 °C); Max - Temp  Av.8 °F (36.6 °C)  Min: 97.5 °F (36.4 °C)  Max: 97.9 °F (36.6 °C)    General:  Alert and oriented,  No apparent distress  Skin- without jaundice  Eyes: anicteric sclera  Cardiac: RRR, Nl s1s2, without murmurs  Lungs CTA Bilaterally, normal effort  Abdomen soft, ND, NT, no HSM, Bowel sounds normal  Ext: without edema  Neuro: no asterixis     Data    Data Review:    Recent Labs     10/11/20  0555 10/12/20  0245 10/12/20  2030 10/13/20  0400   WBC 12.6* 11.9*  --  8.9   HGB 8.3* 8.1* 6.4* 7.8*   HCT 26.0* 26.1* 20.0* 24.0*   MCV 76.8* 78.2*  --  80.4*    161  --  133     Recent Labs     10/11/20  0557 10/12/20  0245 10/13/20  0400    140 135   K 3.4 3.1* 3.3*   CL 97 99 97   CO2 26 24 24   BUN 47* 51* 48*   CREATININE 4.01* 4.40* 3.81*     No results for input(s): AST, ALT, ALB, BILIDIR, BILITOT, ALKPHOS in the last 72 hours. No results for input(s): LIPASE, AMYLASE in the last 72 hours. No results for input(s): PROTIME, INR in the last 72 hours.         ASSESSMENT:  77-year-old -American female admitted with UTI and sepsis, GI was asked to evaluate for GI bleeding, patient has had no overt bleeding since arrival, hemoglobin is 7.8, previous complete endoscopic investigation including push enteroscopy and colonoscopy were completed within the past several months showed no evidence of any active source of GI blood loss, she is scheduled for an outpatient small bowel pill capsule. She is having persistent watery mucousy stools. PLAN :  1.  Continue supportive course of care  2. Serial H&H trend and transfuse accordingly  3. Outpatient follow-up with Dr. Estephania Villegas for small bowel pill capsule endoscopy as previously planned  4. Await stool studies  5. No overt GI bleeding, no need for further GI work-up for endoscopic investigation, GI will sign off, please call with any evidence of active GI bleeding. Thank you for allowing me to participate in the care of your patient. Please feel free to contact me with any concerns.     Casper Acosta, JOHNATHON - CNP

## 2020-10-13 NOTE — PROGRESS NOTES
Nephrology Progress Note    Assessment:  SAÚL sepsis non oluric cardiac arrest  Hypertension uncontrolled  Hyperlipidmia  Sepsis UTI  Anemia          Plan:decrease lasix drip watch I&O discuss with cardiology future treatment of septal hypertrophy medication    Patient Active Problem List:     Hypertension     Hyperlipidemia     Uncontrolled type 2 diabetes mellitus with complication, without long-term current use of insulin (HCC)     Fibromyalgia     Anxiety     Smoker     Insomnia     DJD (degenerative joint disease), lumbar     Lumbosacral radiculopathy at S1     DDD (degenerative disc disease), lumbar     Dysphonia     CTS (carpal tunnel syndrome)     High risk medication use-Norco - 12/20/17 OARRS PM&R, 02/20/18 OARRS PM&R, 03/07/18 OARRS PM&R, Urine Drug screen negative 02/06/17 PM&R--MED CONTRACT 2/6/17     SOB (shortness of breath) on exertion     Chest pain     Memory deficit     Artificial lens present     Presbyopia     Astigmatism, regular     Cataract, nuclear sclerotic senile     IDDM (insulin dependent diabetes mellitus)     Regular astigmatism     Nuclear senile cataract     Neck pain     Lumbosacral radiculopathy at L5     Spinal stenosis of lumbar region with neurogenic claudication     Impaired mobility and activities of daily living     Non-compliant patient NO showed FU 1/10/17 Dr Shawn Bob     Insomnia secondary to chronic pain     Reactive depression     Diabetic radiculopathy (HCC)     Cervical radicular pain     Diabetic asymmetric polyneuropathy (HCC)     Myalgia     Intercostal neuropathy     Osteoarthritis of spine with radiculopathy, lumbar region     Acute combined systolic and diastolic CHF, NYHA class 1 (HCC)     PMB (postmenopausal bleeding)     Acute on chronic diastolic heart failure (HCC)     CHF (congestive heart failure) ([address]     Hypertensive urgency     Uncontrolled type 2 diabetes mellitus with hyperglycemia ([address]     Chronic obstructive pulmonary disease with acute exacerbation ([facility]     Acute on chronic diastolic (congestive) heart failure (HCC)     SAÚL (acute kidney injury) ([facility]     Hypoglycemia     HOCM (hypertrophic obstructive cardiomyopathy) (HCC)     Gastrointestinal hemorrhage     COPD exacerbation (HCC)     Anemia     AVM (arteriovenous malformation)     Symptomatic anemia     Flash pulmonary edema (HCC)     Acute on chronic kidney failure (HCC)     Dysarthria     Chronic fatigue     Acute encephalopathy     Adenomatous polyp of sigmoid colon     Adenomatous polyp of transverse colon     Sepsis (HCC)     Major depressive disorder in remission ([facility]     Gastroesophageal reflux disease without esophagitis     Acute cystitis without hematuria     CKD (chronic kidney disease) stage 4, GFR 15-29 ml/min ([facility])     Cardiopulmonary arrest ([facility]      Subjective:  Admit Date: 10/5/2020    Interval History: no issues worried about dying    Medications:  Scheduled Meds:   potassium chloride  10 mEq Intravenous Q1H    cloNIDine  0.2 mg Oral BID    insulin lispro  10 Units Subcutaneous TID WC    insulin lispro  0-12 Units Subcutaneous TID WC    insulin lispro  0-6 Units Subcutaneous Nightly    furosemide  20 mg Intravenous Once    cefUROXime  500 mg Oral 2 times per day    isosorbide mononitrate  30 mg Oral Daily    carvedilol  25 mg Oral BID WC    spironolactone  25 mg Oral Daily    insulin glargine  15 Units Subcutaneous Nightly    sodium chloride  20 mL Intravenous Once    verapamil  120 mg Oral Nightly    sodium chloride flush  10 mL Intravenous 2 times per day    heparin (porcine)  5,000 Units Subcutaneous 3 times per day    pantoprazole  40 mg Intravenous Daily    And    sodium chloride (PF)  10 mL Intravenous Daily    [Held by provider] PARoxetine  40 mg Oral QAM    rosuvastatin  40 mg Oral QPM     Continuous Infusions:   dextrose      furosemide (LASIX) 1mg/ml infusion 5 mg/hr (10/13/20 0843)    dextrose         CBC:   Recent Labs     10/12/20  0245 10/12/20  8198 10/13/20  0400   WBC 11.9*  --  8.9   HGB 8.1* 6.4* 7.8*     --  133     CMP:    Recent Labs     10/11/20  0557 10/12/20  0245 10/13/20  0400    140 135   K 3.4 3.1* 3.3*   CL 97 99 97   CO2 26 24 24   BUN 47* 51* 48*   CREATININE 4.01* 4.40* 3.81*   GLUCOSE 274* 264* 460*   CALCIUM 6.3* 6.9* 7.2*   LABGLOM 10.8* 9.7* 11.5*     Troponin: No results for input(s): TROPONINI in the last 72 hours. BNP: No results for input(s): BNP in the last 72 hours. INR: No results for input(s): INR in the last 72 hours. Lipids: No results for input(s): CHOL, LDLDIRECT, TRIG, HDL, AMYLASE, LIPASE in the last 72 hours. Liver: No results for input(s): AST, ALT, ALKPHOS, PROT, LABALBU, BILITOT in the last 72 hours. Invalid input(s): BILDIR  Iron:  No results for input(s): IRONS, FERRITIN in the last 72 hours. Invalid input(s): LABIRONS  Urinalysis: No results for input(s): UA in the last 72 hours.     Objective:  Vitals: BP (!) 182/69   Pulse 69   Temp 97.5 °F (36.4 °C) (Oral)   Resp 19   Ht 4' 11\" (1.499 m)   Wt 160 lb 7.9 oz (72.8 kg)   LMP  (LMP Unknown)   SpO2 100%   BMI 32.42 kg/m²    Wt Readings from Last 3 Encounters:   10/10/20 160 lb 7.9 oz (72.8 kg)   09/29/20 130 lb (59 kg)   08/24/20 130 lb (59 kg)      24HR INTAKE/OUTPUT:      Intake/Output Summary (Last 24 hours) at 10/13/2020 1041  Last data filed at 10/13/2020 0845  Gross per 24 hour   Intake 627 ml   Output 2200 ml   Net -1573 ml       General: alert, in no apparent distress  HEENT: normocephalic, atraumatic, anicteric  Neck: supple, no mass  Catheter in chest  Lungs: non-labored respirations, clear to auscultation bilaterally  Heart: regular rate and ZCLNSJ,0/7 systolic murmurs or rubs  Abdomen: soft, non-tender, non-distended  Ext: no cyanosis, 1+ peripheral edema  Neuro: alert and oriented, no gross abnormalities  Psych: normal mood and affect  Skin: no rash      Electronically signed by Sylvie Prado MD

## 2020-10-13 NOTE — PROGRESS NOTES
Physical Therapy Missed Treatment   Facility/Department: [facility] MED SURG -96    NAME: Kaylie Mosher    : 1945 (67 y.o.)  MRN: [id number]    Account: [de-identified]  Gender: female    Chart reviewed, attempted PT at 10:20. Patient unavailable 2° to:    [] Hold per nsg request    [] Pt declined    [] Nsg notified   [] Other notified    [x] Pt. . off floor for test/procedure. [] Pt. Unavailable       Will attempt PT treatment again at earliest convenience.       Electronically signed by Saturnino Alejo PTA on 10/13/20 at 10:22 AM EDT

## 2020-10-13 NOTE — PROGRESS NOTES
Wound Ostomy Continence Nurse  Consult Note       NAME:  Robina Castaneda Said RECORD NUMBER:  [id number]  AGE: 67 y.o. GENDER: female  : 1945  TODAY'S DATE:  10/13/2020    Subjective   Reason for [id number] Nurse Evaluation and Assessment: Evaluation of buttocks      Alma Taylor is a 67 y.o. female referred by:   [] Physician  [x] Nursing  [] Other:     Wound Identification:  Wound Type: Incontinence Associated Derm (IAD)  Contributing Factors: diabetes, decreased mobility, shear force, obesity, malnutrition and incontinence of stool    Wound History: Patient admitted to [facility] on 10/5, has been having frequent incontinent loose stools and patient has developed IAD with few areas of denudation. Current Wound Care Treatment:  Recommendin) Pressure injury prevention interventions 2) protective yrn cream with zinc 3) Low air-loss mattress     Patient Goal of Care:  [x] Wound Healing  [] Odor Control  [] Palliative Care  [] Pain Control   [x] Other: Pressure injury prevention         PAST MEDICAL HISTORY        Diagnosis Date    Anxiety     Asthma     dx 2019 / has smoked since age 14    [facility]est[facility]     Chronic back pain     Bilateral L5 S1 Radic on emg--surprisingly worse on the left than the right--pt's symptoms and her MRI show worse on the right    Chronic obstructive pulmonary disease with acute exacerbation ([address] 10/12/2019    Depression     Fibromyalgia     Hyperlipidemia     meds > 8 yrs    Hypertension     meds > 45 yrs    On home O2     2l per n/c at bedtime mostly,     Osteoarthritis     Type II diabetes mellitus, uncontrolled ([address]     hx > 8 yrs    Unspecified sleep apnea        PAST SURGICAL HISTORY    Past Surgical History:   Procedure Laterality Date    BACK SURGERY  2017    lumbar disc    CARDIAC CATHETERIZATION  11/3/14     DR. MIRELES / no stents    COLONOSCOPY  2016    w/polypectomy     COLONOSCOPY N/A 2020 COLONOSCOPY WITH POLYPECTOMY performed [facility] Joe MD at [facility] N/A 10/7/2019    EUA HYSTEROSCOPY DILATATION AND CURETTAGE performed by Christi Suarez DO at [facility]. [address], COLON, DIAGNOSTIC      EYE SURGERY      [place] with IOL OU [address]    umbilical hernia repair    NC ESOPHAGOGASTRODUODENOSCOPY TRANSORAL DIAGNOSTIC N/A 3/24/2017    EGD ESOPHAGOGASTRODUODENOSCOPY performed by Mechelle Murphy MD at [facility] N/A 2018    negative findings    NC REVISE MEDIAN N/CARPAL TUNNEL SURG Left 2017    LEFT  CARPAL TUNNEL RELEASE performed by Jodee Marcelo MD at [facility],4+E/O,OVACE N/A 2018    TONSILLECTOMY      as child    UPPER GASTROINTESTINAL ENDOSCOPY  2016    w/bx     UPPER GASTROINTESTINAL ENDOSCOPY N/A 2020    EGD possible biopsy performed by Armand Joe MD at [address] N/A 2020    EGD PUSH ENTEROSCOPY performed by Jaycob Mcgowan MD at [facility]    Family History   Problem Relation Age of Onset    Heart Disease Father         cardiac bypass    Arthritis Father     Arthritis Mother     Other Mother          at age 80    Other Sister         [facility]    No Known Problems Daughter     Stroke Son        SOCIAL HISTORY    Social History     Tobacco Use    Smoking status: Former Smoker     Packs/day: 1.00     Years: 59.00     Pack years: 59.00     Types: Cigarettes     Start date: 2017    Smokeless tobacco: Never Used    Tobacco comment: quit 2-3 weeks ago    Substance Use Topics    Alcohol use: Not Currently    Drug use: No       ALLERGIES    Allergies   Allergen Reactions    Ibuprofen Nausea Only    Metformin And Related      Diarrhea      Darvon [Propoxyphene Hcl] Nausea And Vomiting       MEDICATIONS    No Value Date    APTT 53.1 10/07/2020   [APTT}  PT/INR:    Lab Results   Component Value Date    PROTIME 24.8 10/07/2020    INR 2.3 10/07/2020     HgBA1c:    Lab Results   Component Value Date    LABA1C 10.1 10/12/2020       Assessment   Yury Risk Score: Yury Scale Score: 16    Patient Active Problem List   Diagnosis    Hypertension    Hyperlipidemia    Uncontrolled type 2 diabetes mellitus with complication, without long-term current use of insulin (HCC)    Fibromyalgia    Anxiety    Smoker    Insomnia    DJD (degenerative joint disease), lumbar    Lumbosacral radiculopathy at S1    DDD (degenerative disc disease), lumbar    Dysphonia    CTS (carpal tunnel syndrome)    High risk medication use-Norco - 12/20/17 OARRS PM&R, 02/20/18 OARRS PM&R, 03/07/18 OARRS PM&R, Urine Drug screen negative 02/06/17 PM&R--MED CONTRACT 2/6/17    SOB (shortness of breath) on exertion    Chest pain    Memory deficit    Artificial lens present    Presbyopia    Astigmatism, regular    Cataract, nuclear sclerotic senile    IDDM (insulin dependent diabetes mellitus)    Regular astigmatism    Nuclear senile cataract    Neck pain    Lumbosacral radiculopathy at L5    Spinal stenosis of lumbar region with neurogenic claudication    Impaired mobility and activities of daily living    Non-compliant patient NO showed FU 1/10/17 Dr Jc Lozano Insomnia secondary to chronic pain    Reactive depression    Diabetic radiculopathy (HCC)    Cervical radicular pain    Diabetic asymmetric polyneuropathy (HCC)    Myalgia    Intercostal neuropathy    Osteoarthritis of spine with radiculopathy, lumbar region    Acute combined systolic and diastolic CHF, NYHA class 1 (HCC)    PMB (postmenopausal bleeding)    Acute on chronic diastolic heart failure (HCC)    [facility]) [facility]    Hypertensive urgency    Uncontrolled type 2 diabetes mellitus with hyperglycemia (HCC)    Chronic obstructive pulmonary

## 2020-10-13 NOTE — PROGRESS NOTES
Physical Therapy Med Surg Daily Treatment Note  Facility/Department: [address] [facility]  Room: [place] [address]       NAME: Shaylee Wright  : 1945 (11 y.o.)  MRN: [id number]  CODE STATUS: Full Code    Date of Service: 10/13/2020    Patient Diagnosis(es): Sepsis [facility]) [A41.9]   Chief Complaint   Patient presents with    Abdominal Pain     Patient Active Problem List    Diagnosis Date Noted    Cardiopulmonary arrest [facility])      Priority: High    Lumbosacral radiculopathy at L5 2015     Priority: High     Class: Acute    Spinal stenosis of lumbar region with neurogenic claudication 2015     Priority: High     Class: Chronic    High risk medication use-Norco - 17 OARRS PM&R, 18 OARRS PM&R, 18 OARRS PM&R, Urine Drug screen negative 17 PM&R--MED CONTRACT 2014     Priority: High    Sepsis ([address] 10/06/2020    Major depressive disorder in remission ([address] 10/06/2020    Gastroesophageal reflux disease without esophagitis 10/06/2020    Acute cystitis without hematuria 10/06/2020    CKD (chronic kidney disease) stage 4, GFR 15-29 ml/min ([address] 10/06/2020    Adenomatous polyp of sigmoid colon     Adenomatous polyp of transverse colon     Acute encephalopathy     Flash pulmonary edema ([address] 2020    Acute on chronic kidney failure ([address] 2020    Dysarthria     Chronic fatigue     Symptomatic anemia 2020    COPD exacerbation ([address] 2020    Anemia     AVM (arteriovenous malformation)     Gastrointestinal hemorrhage 2020    HOCM (hypertrophic obstructive cardiomyopathy) ([address] 2019    Hypoglycemia     SAÚL (acute kidney injury) ([address] 10/23/2019    Acute on chronic diastolic (congestive) heart failure ([address] 10/13/2019    Chronic obstructive pulmonary disease with acute exacerbation ([address] 10/12/2019    Hypertensive urgency 10/09/2019    Uncontrolled type 2 diabetes mellitus with hyperglycemia ([address]     Acute on chronic diastolic heart failure ([address] 10/08/2019    CH[facility]congest[facility] failure) [facility]     PMB (postmenopausal bleeding)     Acute combined systolic and diastolic CHF, NYHA class 1 ([address] 03/24/2019    Osteoarthritis of spine with radiculopathy, lumbar region 11/07/2018    Myalgia 05/11/2018    Intercostal neuropathy 05/11/2018    Diabetic asymmetric polyneuropathy ([address] 04/11/2017    Cervical radicular pain 12/03/2016    Reactive depression 07/15/2016    Diabetic radiculopathy ([address] 07/15/2016    Insomnia secondary to chronic pain 04/15/2016    Non-compliant patient NO showed FU 1/10/17 Dr Stephen Mott 09/24/2015    Impaired mobility and activities of daily living 03/28/2015    Neck pain 03/04/2015    Artificial lens present 10/03/2014    Presbyopia 10/03/2014    Astigmatism, regular 10/03/2014    Cataract, nuclear sclerotic senile 10/03/2014    IDDM (insulin dependent diabetes mellitus) 10/03/2014    Regular astigmatism 10/03/2014    Nuclear senile cataract 10/03/2014    Memory deficit 06/04/2014    SOB (shortness of breath) on exertion 03/14/2014    Chest pain 03/14/2014    CTS (carpal tunnel syndrome) 02/09/2014    DJD (degenerative joint disease), lumbar 02/08/2014    Lumbosacral radiculopathy at S1 02/08/2014    DDD (degenerative disc disease), lumbar 02/08/2014    Dysphonia 02/08/2014    Insomnia 12/04/2013    Smoker 06/18/2013    Hypertension     Hyperlipidemia     Uncontrolled type 2 diabetes mellitus with complication, without long-term current use of insulin ([facility])     Fibromyalgia     Anxiety         Past Medical History:   Diagnosis Date    Anxiety     Asthma     dx 2019 / has smoked since age 14    CHF (congestive heart failure) ([facility])     Chronic back pain     Bilateral L5 S1 Radic on emg--surprisingly worse on the left than the right--pt's symptoms and her MRI show worse on the right    Chronic obstructive pulmonary disease with acute exacerbation ([address] 10/12/2019    Depression     Fibromyalgia     Hyperlipidemia     meds > 8 yrs    Hypertension     meds > 45 yrs    On home O2     2l per n/c at bedtime mostly,     Osteoarthritis     Type II diabetes mellitus, uncontrolled ([address]     hx > 8 yrs    Unspecified sleep apnea      Past Surgical History:   Procedure Laterality Date    BACK SURGERY  2017    lumbar disc    CARDIAC CATHETERIZATION  11/3/14     DR. MIRELES / no stents    COLONOSCOPY  08/29/2016    w/polypectomy     COLONOSCOPY N/A 9/29/2020    COLONOSCOPY WITH POLYPECTOMY performed by Angela Jim[facility]nez MD at [facility] N/A 10/7/2019    EUA HYSTEROSCOPY DILATATION AND CURETTAGE performed by Clarence Hackett DO at [facility], COLON, DIAGNOSTIC      EYE SURGERY      [place] with IOL OU / [address]    umbilical hernia repair    [place] ESOPHAGOGASTRODUODENOSCOPY TRANSORAL DIAGNOSTIC N/A 3/24/2017    EGD ESOPHAGOGASTRODUODENOSCOPY performed by Lia Ramsey MD at [place] [address] 2/8/2018    negative findings    OK REVISE MEDIAN N/CARPAL TUNNEL SURG Left 6/5/2017    LEFT  CARPAL TUNNEL RELEASE performed by Josef Floyd MD at [facility] CLVL,5+R/D,VOZVL N/A 2/8/2018    TONSILLECTOMY      as child    UPPER GASTROINTESTINAL ENDOSCOPY  08/26/2016    w/bx     UPPER GASTROINTESTINAL ENDOSCOPY N/A 2/25/2020    EGD possible biopsy performed by Angela Jiménez MD at [address] [place] [address] 7/1/2020    EGD PUSH ENTEROSCOPY performed by Isma Brgigs MD at [facility]       Restrictions  Restrictions/Precautions: Fall Risk    SUBJECTIVE   General  Chart Reviewed: Yes  Family / Caregiver Present: Yes(Pina present at tx.)  Subjective  Subjective: \"I want to get out of this bed and walk\"    Pre-Session Pain Report     Pain Screening  Patient Currently in Pain: Denies       Post-Session Pain Report            OBJECTIVE        Bed mobility  Supine to Sit: Contact guard assistance  Sit to Supine: Contact guard assistance    Transfers  Sit to Stand: Contact guard assistance  Stand to sit: Contact guard assistance  Comment: Standing tolerance of 1 min 45 sec. before sitting due to fatigue. Ambulation  Ambulation?: Yes  Ambulation 1  Surface: level tile  Device: Rolling Walker  Assistance: Contact guard assistance  Quality of Gait: Side stepping at EOB, shuffling pattern  Distance: 3ft down, 3 ft back. Comments: Poor posture, fwd flexed trunk, rapid fatigue. Stairs/Curb  Stairs?: No               Exercises  Hip Abduction: MRE hip ABD/ADD x 10  Knee Long Arc Quad: x10  Ankle Pumps: x10                    Activity Tolerance  Activity Tolerance: Patient limited by fatigue    PT Education  PT Education: Goals  Patient Education: Edu. pt on progression of therapy encouraged to perform better each day. ASSESSMENT   Assessment: Pt unable to stand for 2 min, displayed poor posture, rapid fatigue with standing, side stepping at EOB x3 feet down and back before needing to stop due to fatigue. Pt motivated at this time, looking forward to therapy tomorrow. Patient Education: Edu. pt on progression of therapy encouraged to perform better each day.      Discharge Recommendations:  Continue to assess pending progress    Goals  Long term goals  Long term goal 1: Bed mobility with indep  Long term goal 2: Functional transfers with indep  Long term goal 3: Amb 50ft with LRAD and indep  Long term goal 4: 4 steps with handrail and SBA to enter/exit home  Patient Goals   Patient goals : \"return home\"    PLAN    Times per week: 3-6  Safety Devices  Type of devices: Left in bed, Call light within reach, Bed alarm in place     AMPA (6 CLICK) BASIC MOBILITY  AM-PAC Inpatient Mobility Raw Score : 15     Therapy Time   Individual   Time In 1500   Time Out 1518   Minutes 18      Transfers: 8 min     Gait: 5 min TherEx: 5 min     Benson Landry  10/13/20 at 3:27 PM         Definitions for assistance levels  Independent = pt does not require any physical supervision or assistance from another person for activity completion. Device may be needed.   Stand by assistance = pt requires verbal cues or instructions from another person, close to but not touching, to perform the activity  Minimal assistance= pt performs 75% or more of the activity; assistance is required to complete the activity  Moderate assistance= pt performs 50% of the activity; assistance is required to complete the activity  Maximal assistance = pt performs 25% of the activity; assistance is required to complete the activity  Dependent = pt requires total physical assistance to accomplish the task

## 2020-10-13 NOTE — PROGRESS NOTES
Chief Complaint   Patient presents with    Abdominal Pain     Patient is a 67 y.o. female who presents with a chief complaint of weakness. . Patient is followed on a regular basis by Dr. Stevan Kearns MD. Apparently patient had been expressing fevers and chills as well as weakness and presented to the emergency department, also complained of dysuria. Patient with history of diastolic congestive heart failure, chronic kidney disease, anemia, history of hypertrophic obstructive cardiomyopathy, gastric AVM. Status post cardiac catheterization in October 2019 that showed normal coronary arteries. Patient was admitted and being treated for urinary tract infection. Apparently she was noted to be hypotensive and bradycardic this morning and CODE BLUE was called. CPR was initiated and patient was given multiple rounds of epinephrine as well as atropine and CPR was initiated for approximate 20 minutes and ROSC was achieved. I was contacted by intensivist to evaluate patient for hypotension as well as bradycardia. Patient was thought to have an acute cardiac event. She was bradycardic in the 30s to 40s with blood pressure in the 70s to 80s on 2 pressors. 10/8/2020[de-identified] Patient is Arctic sun protocol still. She is intubated and sedated. She is currently off of IV pressors and her potassium has been corrected. Transvenous pacemaker in place and set at 45 bpm, currently she is in sinus bradycardia on telemetry heart rate in the 40s to 50s. Creatinine is 3.17. Potassium is 3.1. WBC is down to 11.     10/9/2020: Remains on Arctic sun and intubated and sedated. She is independent of transvenous pacemaker heart rate is currently normal sinus rhythm in the 80s. Potassium is actually low and is being corrected. She is not on any pressors, blood pressure stable. 10/10/2020: Patient is extubated and very alert today. Family is at the bedside. She denies any chest pain or shortness of breath.   She is audibly wheezing. She is in normal sinus rhythm on telemetry with heart rate into the 80s or 90s for. Not dependent on transvenous pacer. Transvenous pacer pulled at bedside. 10/11/2020: Patient is resting comfortably. No new events overnight. Normal sinus rhythm on telemetry. Potassium is 3.4. Hemoglobin is 8.3. Renal function worsening with a creatinine of 4.0 now. She is hemodynamically stable. 10-12-20: transfering to tele. Denies any CP or SOB. + diarrhea. HD stable. NSR on tele. 10/13/2020: Resting comfortably now. No reported chest pain or shortness of breath. She continues to have diarrhea. Normal sinus rhythm on telemetry. Renal function improving. Blood pressure remains elevated. 191/68.         Past Medical History        Past Medical History:   Diagnosis Date    Anxiety     Asthma     dx 2019 / has smoked since age 14    CHF (congestive heart failure) ([facility])     Chronic back pain     Bilateral L5 S1 Radic on emg--surprisingly worse on the left than the right--pt's symptoms and her MRI show worse on the right    Chronic obstructive pulmonary disease with acute exacerbation ([facility] [address] 10/12/2019    Depression     Fibromyalgia     Hyperlipidemia     meds > 8 yrs    Hypertension     meds > 45 yrs    On home O2     2l per n/c at bedtime mostly,     Osteoarthritis     Type II diabetes mellitus, uncontrolled ([facility])     hx > 8 yrs    Unspecified sleep apnea          Patient Active Problem List   Diagnosis    Hypertension    Hyperlipidemia    Uncontrolled type 2 diabetes mellitus with complication, without long-term current use of insulin ([facility])    Fibromyalgia    Anxiety    Smoker    Insomnia    DJD (degenerative joint disease), lumbar    Lumbosacral radiculopathy at S1    DDD (degenerative disc disease), lumbar    Dysphonia    CTS (carpal tunnel syndrome)    High risk medication use-Norco - 12/20/17 OARRS PM&R, 02/20/18 OARRS PM&R, 03/07/18 OARRS PM&R, Urine Drug screen negative 02/06/17 PM&R--MED CONTRACT 2/6/17    SOB (shortness of breath) on exertion    Chest pain    Memory deficit    Artificial lens present    Presbyopia    Astigmatism, regular    Cataract, nuclear sclerotic senile    IDDM (insulin dependent diabetes mellitus)    Regular astigmatism    Nuclear senile cataract    Neck pain    Lumbosacral radiculopathy at L5    Spinal stenosis of lumbar region with neurogenic claudication    Impaired mobility and activities of daily living    Non-compliant patient NO showed FU 1/10/17 Dr Alley Freed Insomnia secondary to chronic pain    Reactive depression    Diabetic radiculopathy (HCC)    Cervical radicular pain    Diabetic asymmetric polyneuropathy (HCC)    Myalgia    Intercostal neuropathy    Osteoarthritis of spine with radiculopathy, lumbar region    Acute combined systolic and diastolic CHF, NYHA class 1 (HCC)    PMB (postmenopausal bleeding)    Acute on chronic diastolic heart failure (HCC)    CH[facility] (congest[facility] failure) ([facility])    Hypertensive urgency    Uncontrolled type 2 diabetes mellitus with hyperglycemia (HCC)    Chronic obstructive pulmonary disease with acute exacerbation (HCC)    Acute on chronic diastolic (congestive) heart failure (HCC)    SAÚL (acute kidney injury) ([address]    Hypoglycemia    HOCM (hypertrophic obstructive cardiomyopathy) ([facility])    Gastrointestinal hemorrhage    COPD exacerbation (HCC)    Anemia    AVM (arteriovenous malformation)    Symptomatic anemia    Flash pulmonary edema (HCC)    Acute on chronic kidney failure (HCC)    Dysarthria    Chronic fatigue    Acute encephalopathy    Adenomatous polyp of sigmoid colon    Adenomatous polyp of transverse colon    Sepsis (HCC)    Major depressive disorder in remission ([address]    Gastroesophageal reflux disease without esophagitis    Acute cystitis without hematuria    CKD (chronic kidney disease) stage 4, GFR 15-29 ml/min ([facility])     Past Surgical History Past Surgical History:   Procedure Laterality Date    BACK SURGERY  2017    lumbar disc    CARDIAC CATHETERIZATION  11/3/14     DR. MIRELES / no stents    COLONOSCOPY  08/29/2016    w/polypectomy     COLONOSCOPY N/A 9/29/2020    COLONOSCOPY WITH POLYPECTOMY performed by Kellen Alvarez MD at [facility] N/A 10/7/2019    EUA HYSTEROSCOPY DILATATION AND CURETTAGE performed by Dariel Valenzuela DO at [facility]. [address], COLON, DIAGNOSTIC      EYE SURGERY      [place] with IOL OU / [address]    umbilical hernia repair    DC ESOPHAGOGASTRODUODENOSCOPY TRANSORAL DIAGNOSTIC N/A 3/24/2017    EGD ESOPHAGOGASTRODUODENOSCOPY performed by Nacho Dickinson MD at [place] [address] 2/8/2018    negative findings    DC REVISE MEDIAN N/CARPAL TUNNEL SURG Left 6/5/2017    LEFT CARPAL TUNNEL RELEASE performed [facility] Alonso Kemp MD at [facility]E,[facility] N/A 2/8/2018    TONSILLECTOMY      as child    UPPER GASTROINTESTINAL ENDOSCOPY  08/26/2016    w/bx     UPPER GASTROINTESTINAL ENDOSCOPY N/A 2/25/2020    EGD possible biopsy performed by Kellen Alvarez MD at [address] 7/1/2020    EGD PUSH ENTEROSCOPY performed by Joseph Martinez MD at [address] Marital status:      Spouse name: None    Number of children: None    Years of education: None    Highest education level: None   Occupational History    Occupation: Retired-   Social Needs    Financial resource strain: None    Food insecurity     Worry: None     Inability: None    Transportation needs     Medical: None     Non-medical: None   Tobacco Use    Smoking status: Former Smoker     Packs/day: 1.00     Years: 59.00     Pack years: 59.00     Types: Cigarettes     Start date: 2017    Smokeless tobacco: Never Used    Tobacco comment: quit 2-3 weeks ago    Substance and Sexual Activity    Alcohol use: Not Currently    Drug use: No    Sexual activity: Yes   Lifestyle    Physical activity     Days per week: None     Minutes per session: None    Stress: None   Relationships    Social connections     Talks on phone: None     Gets together: None     Attends L[employer] service: None     Active member of club or organization: None     Attends meetings of clubs or organizations: None     Relationship status: None    Intimate partner violence     Fear of current or ex partner: None     Emotionally abused: None     Physically abused: None     Forced sexual activity: None   Other Topics Concern    None   Social History Narrative    None     Family History         Family History   Problem Relation Age of Onset    Heart Disease Father     cardiac bypass    Arthritis Father     Arthritis Mother     Other Mother      at age 80    Other Sister     [facility]    No Known Problems Daughter     Stroke Son      Current Facility-Administered Medications             Current Facility-Administered Medications   Medication Dose Route Frequency Provider Last Rate Last Dose    0.9 % sodium chloride bolus 500 mL Intravenous Once [facility]      propofol 1000 MG/100ML injection          propofol injection 10 mcg/kg/min Intravenous Titrated Sebas Villalba MD      fentaNYL (SUBLIMAZE) 1,000 mcg in sodium chloride 0.9 % 100 mL infusion 25 mcg/hr Intravenous Continuous Sebas Villalba MD      sodium bicarbonate 150 mEq in dextrose 5 % 1,000 mL infusion  Intravenous Continuous Sebas Villalba  mL/hr at 10/07/20 0950     0.9 % sodium chloride infusion  Intravenous Once Sebas Villalba MD      0.9 % sodium chloride infusion  Intravenous Once Sebas Villalba MD      promethazine (PHENERGAN) tablet 12.5 mg 12.5 mg Oral Q6H PRN Sebas Villalba MD      Or   [facility] ondansetron (ZOFRAN) injection 4 mg 4 mg Intravenous Q6H PRN Rony Cardenas MD      chlorhexidine (PERIDEX) 0.12 % solution 15 mL 15 mL Mouth/Throat BID Rony Cardenas MD      DOPamine (INTROPIN) 400 mg in dextrose 5 % 250 mL infusion 5 mcg/kg/min Intravenous Continuous Rony Cardenas MD 11.2 mL/hr at 10/07/20 0951 5 mcg/kg/min at 10/07/20 0951    EPINEPHrine 1 MG/10ML injection          EPINEPHrine 1 MG/10ML injection          EPINEPHrine 1 MG/10ML injection          ondansetron (ZOFRAN) injection 4 mg 4 mg Intravenous D1K PRN Kendallobir Tatiana, DO  4 mg at 12/07/22 1437    carvedilol (COREG) tablet 50 mg 50 mg Oral BID Ronobir Tatiana, DO  50 mg at 65/76/97 2141    dicyclomine (BENTYL) capsule 10 mg 10 mg Oral 4x Daily PRN [employer], DO      digoxin (LANOXIN) tablet 125 mcg 125 mcg Oral Daily Ronobir Tatiana, DO  605 mcg at 10/06/20 0829    hydrALAZINE (APRESOLINE) tablet 100 mg 100 mg Oral BID Ronobir Tatiana, DO  254 mg at 00/21/69 2141    insulin glargine (LANTUS) injection vial 60 Units 60 Units Subcutaneous Nightly Ronobir Tatiana, DO      insulin lispro (HUMALOG) injection vial 20 Units 20 Units Subcutaneous TID WC Ronobir Tatiana, DO  20 Units at 10/06/20 1635    pantoprazole (PROTONIX) tablet 40 mg 40 mg Oral QAM AC Ronobir Tatiana, DO  40 mg at 66/35/24 0629    PARoxetine (PAXIL) tablet 40 mg 40 mg Oral QAM Ronobir Tatiana, DO  40 mg at 13/84/74 0828    rosuvastatin (CRESTOR) tablet 40 mg 40 mg Oral QPM Ronobir Tatiana, DO  40 mg at 92/50/63 1635    verapamil (CALAN SR) extended release tablet 120 mg 120 mg Oral Nightly Ronobir Tatiana, DO  670 mg at 35/65/51 2140    sodium chloride flush 0.9 % injection 10 mL 10 mL Intravenous 2 times per day [employer], DO      sodium chloride flush 0.9 % injection 10 mL 10 mL Intravenous PRN Ronobir Tatiana, DO      acetaminophen (TYLENOL) tablet 650 mg 650 mg Oral H4O PRN Ronobir Tatiana, DO      Or    acetaminophen (TYLENOL) in the care of your patient, please don't hesitate to contact me if you have any further questions.        Electronically signed by Herbert Tierney DO on 10/13/2020 at 4:41 PM

## 2020-10-13 NOTE — PROGRESS NOTES
Hospitalist Daily Progress Note  Name: Chuck Reynaga  Age: 67 y.o. Gender: female  CodeStatus: Full Code  Allergies: Ibuprofen  Metformin And Related  Darvon [Propoxyphene Hcl]    Chief Complaint:Abdominal Pain    Primary Care Provider: Belle Cordova MD  InpatientTreatment Team: Treatment Team: Attending Provider: Jaime Araya DO; Utilization Reviewer: Ila Neal RN; Consulting Physician: Joette Sandhoff, DO; Consulting Physician: Castro Castelan MD; Consulting Physician: Winter Bermudez MD; Consulting Physician: Kathrin Wright MD; Registered Nurse: Dre Campbell, AKASH; Consulting Physician: Alyson Marshall MD; Wound Care: Perry Fernández, AKASH; : Andreas Frederick RN; Patient Care Tech: Caffie Free  Admission Date: 10/5/2020      Subjective: Patient seen and bedside. Vitals stable see  7.7 respirations 20 pulse 72 blood pressure 94/60 saturating high percent 3 L. Hemoglobin 7.4 today the patient continues to have frequent dark tarry stools and bloody stools. ,  She also remains profoundly hypertensive and is on a Lasix infusion. Physical Exam  Constitutional:       Appearance: Normal appearance. She is not ill-appearing or diaphoretic. HENT:      Head: Normocephalic and atraumatic. Nose: Nose normal.      Mouth/Throat:      Mouth: Mucous membranes are moist.      Pharynx: Oropharynx is clear. Eyes:      Extraocular Movements: Extraocular movements intact. Pupils: Pupils are equal, round, and reactive to light. Cardiovascular:      Rate and Rhythm: Normal rate. Heart sounds: Murmur present. No friction rub. No gallop. Pulmonary:      Breath sounds: Wheezing and rales present. No rhonchi. Abdominal:      General: There is no distension. Tenderness: There is no abdominal tenderness. There is no guarding. Musculoskeletal: Normal range of motion. General: Swelling present. Neurological:      General: No focal deficit present.       Mental Status: She is alert and oriented to person, place, and time. Psychiatric:         Mood and Affect: Mood normal.         Thought Content:  Thought content normal.         Judgment: Judgment normal.         Review of Systems  14 point ROS is reviewed and negative  Medications:  Reviewed    Infusion Medications:    dextrose      furosemide (LASIX) 1mg/ml infusion 2 mg/hr (10/13/20 1141)    dextrose       Scheduled Medications:    cloNIDine  0.2 mg Oral BID    cefUROXime  500 mg Oral 2 times per day    insulin glargine  50 Units Subcutaneous Nightly    insulin lispro  18 Units Subcutaneous TID WC    isosorbide mononitrate  30 mg Oral Daily    carvedilol  25 mg Oral BID WC    spironolactone  25 mg Oral Daily    sodium chloride  20 mL Intravenous Once    verapamil  120 mg Oral Nightly    sodium chloride flush  10 mL Intravenous 2 times per day    heparin (porcine)  5,000 Units Subcutaneous 3 times per day    pantoprazole  40 mg Intravenous Daily    And    sodium chloride (PF)  10 mL Intravenous Daily    [Held by provider] PARoxetine  40 mg Oral QAM    rosuvastatin  40 mg Oral QPM     PRN Meds: glucose, dextrose, glucagon (rDNA), dextrose, sodium chloride flush, lidocaine viscous hcl, magic (miracle) mouthwash, labetalol, potassium chloride, sodium chloride flush, promethazine **OR** ondansetron, ondansetron, acetaminophen **OR** acetaminophen, glucose, dextrose, glucagon (rDNA), dextrose, iopamidol    Labs:   Recent Labs     10/12/20  0245 10/12/20  2030 10/13/20  0400 10/13/20  1408   WBC 11.9*  --  8.9 11.7*   HGB 8.1* 6.4* 7.8* 7.4*  7.3*   HCT 26.1* 20.0* 24.0* 22.6*  22.6*     --  133 143     Recent Labs     10/12/20  0245 10/13/20  0400 10/13/20  1408    135 131*   K 3.1* 3.3* 3.5   CL 99 97 94*   CO2 24 24 27   BUN 51* 48* 47*   CREATININE 4.40* 3.81* 3.80*   CALCIUM 6.9* 7.2* 7.3*     Recent Labs     10/13/20  1408   *   *   BILITOT 0.4   ALKPHOS 215*     No results for input(s): INR in the last 72 hours. No results for input(s): Johnnie Hug in the last 72 hours. Urinalysis:   Lab Results   Component Value Date    NITRU POSITIVE 10/05/2020    WBCUA >100 10/05/2020    BACTERIA MANY 10/05/2020    RBCUA 20-50 10/05/2020    BLOODU LARGE 10/05/2020    SPECGRAV 1.015 10/05/2020    GLUCOSEU >=1000 10/05/2020       Radiology:   Most recent    Chest CT      WITH CONTRAST:No results found for this or any previous visit. WITHOUT CONTRAST:   Results for orders placed during the hospital encounter of 08/24/20   CT CHEST WO CONTRAST    Narrative EXAMINATION:  CT SCAN THE CHEST    CLINICAL HISTORY:  Short of breath    COMPARISON:  March 24, 2019    TECHNIQUE:  Multiple serial axial images of the chest from the base the neck through the upper abdomen with both sagittal coronal reconstruction was performed without intravenous or oral administration of contrast.    FINDINGS:    There is a patchy mosaic appearance lung parenchyma that can be seen with small airways disease. There is bibasilar areas of atelectasis, scarring. No focal infiltrates. No effusions no pneumothoraces. No significant periaortic adenopathy. There is pretracheal adenopathy. There is multilevel degenerative changes with osteophytes of the thoracic spine. Impression Unremarkable CT scan the chest as described above      All CT scans at this facility use dose modulation, iterative reconstruction, and/or weight based dosing when appropriate to reduce radiation dose to as low as reasonably achievable. Examination: CT ABDOMEN PELVIS WO CONTRAST, CT CHEST WO CONTRAST    Indication:   flank pain     Technique: Multiple serial axial images was performed through the abdomen and pelvis without intravenous or oral administration of contrast..   Images were reconstructed in the axial and coronal and sagittal planes. Comparison: September 9, 2019    Findings:     The liver, gallbladder, spleen, pancreas, adrenals,  are unremarkable. The kidneys show no significant perinephric stranding. No nephrolithiasis. No hydronephrosis or hydroureter. No bladder calculi. Large and small bowel show no sign of obstruction. The appendix is not visualized. No pericecal stranding. No diverticulitis. No free air. No free fluid. The visualized abdominal aorta is of normal size and caliber. No significant retroperitoneal adenopathy. There is multilevel degenerative changes of lumbar spine. There is a grade 1 anterolisthesis of L4 and L5. There is narrowing of the L5-S1 disc space. Impression: UNREMARKABLE CT SCAN OF THE ABDOMEN AND PELVIS AS DESCRIBED ABOVE      All CT scans at this facility use dose modulation, iterative reconstruction, and/or weight based dosing when appropriate to reduce radiation dose to as low as reasonably achievable. CXR      2-view:   Results for orders placed during the hospital encounter of 08/24/20   XR CHEST (2 VW)    Narrative EXAMINATION: XR CHEST (2 VW)     CLINICAL HISTORY:  sob     COMPARISONS: None     FINDINGS:    Two views of the chest are submitted. The cardiac silhouette is enlarged  Pulmonary vascular congestion with increased interstitial markings. Right sided trachea. No focal infiltrates. No effusions. No Pneumothoraces. Impression PULMONARY VASCULAR CONGESTION WITH INCREASED INTERSTITIAL MARKINGS. RADIOGRAPHIC FINDINGS COULD SUGGEST EARLY CHF. CHF. CORRELATE CLINICALLY        Portable:   Results for orders placed during the hospital encounter of 10/05/20   XR CHEST PORTABLE    Narrative EXAMINATION: XR CHEST PORTABLE. DATE AND TIME:10/9/2020 5:00 AM     CLINICAL HISTORY: SHORTNESS OF BREATH      COMPARISONS: OCTOBER 8, 2020     FINDINGS: ET tube and nasogastric tube remain in satisfactory position.  Persistent to increased basilar markings bilaterally with no significant abnormal findings, and treatment plan. All of pt questions were answered. Counseling, diet and education were provided. Case will be discussed with nursing staff when appropriate. Family will be updated if and whenappropriate.       Electronically signed by Mickey Dang DO on 10/13/2020 at 6:08 PM

## 2020-10-14 PROBLEM — R26.9 GAIT ABNORMALITY: Status: ACTIVE | Noted: 2020-10-14

## 2020-10-14 PROBLEM — I69.90 LATE EFFECTS OF CVA (CEREBROVASCULAR ACCIDENT): Status: ACTIVE | Noted: 2020-10-14

## 2020-10-14 LAB
ABO/RH: NORMAL
ANION GAP SERPL CALCULATED.3IONS-SCNC: 13 MEQ/L (ref 9–15)
ANTIBODY SCREEN: NORMAL
BLOOD BANK DISPENSE STATUS: NORMAL
BLOOD BANK DISPENSE STATUS: NORMAL
BLOOD BANK PRODUCT CODE: NORMAL
BLOOD BANK PRODUCT CODE: NORMAL
BPU ID: NORMAL
BPU ID: NORMAL
BUN BLDV-MCNC: 45 MG/DL (ref 8–23)
CALCIUM SERPL-MCNC: 8.1 MG/DL (ref 8.5–9.9)
CHLORIDE BLD-SCNC: 96 MEQ/L (ref 95–107)
CO2: 26 MEQ/L (ref 20–31)
CREAT SERPL-MCNC: 3.75 MG/DL (ref 0.5–0.9)
DESCRIPTION BLOOD BANK: NORMAL
DESCRIPTION BLOOD BANK: NORMAL
GFR AFRICAN AMERICAN: 14.2
GFR NON-AFRICAN AMERICAN: 11.7
GLUCOSE BLD-MCNC: 127 MG/DL (ref 60–115)
GLUCOSE BLD-MCNC: 130 MG/DL (ref 60–115)
GLUCOSE BLD-MCNC: 221 MG/DL (ref 70–99)
GLUCOSE BLD-MCNC: 264 MG/DL (ref 60–115)
GLUCOSE BLD-MCNC: 316 MG/DL (ref 60–115)
HCT VFR BLD CALC: 20.9 % (ref 37–47)
HCT VFR BLD CALC: 21.9 % (ref 37–47)
HCT VFR BLD CALC: 26.6 % (ref 37–47)
HEMOGLOBIN: 6.8 G/DL (ref 12–16)
HEMOGLOBIN: 7.2 G/DL (ref 12–16)
HEMOGLOBIN: 8.6 G/DL (ref 12–16)
MAGNESIUM: 1.9 MG/DL (ref 1.7–2.4)
MCH RBC QN AUTO: 25.7 PG (ref 27–31.3)
MCHC RBC AUTO-ENTMCNC: 32.9 % (ref 33–37)
MCV RBC AUTO: 78.1 FL (ref 82–100)
PDW BLD-RTO: 22.1 % (ref 11.5–14.5)
PERFORMED ON: ABNORMAL
PLATELET # BLD: 156 K/UL (ref 130–400)
POTASSIUM REFLEX MAGNESIUM: 2.9 MEQ/L (ref 3.4–4.9)
RBC # BLD: 2.81 M/UL (ref 4.2–5.4)
SODIUM BLD-SCNC: 135 MEQ/L (ref 135–144)
WBC # BLD: 13.5 K/UL (ref 4.8–10.8)

## 2020-10-14 PROCEDURE — APPNB30 APP NON BILLABLE TIME 0-30 MINS: Performed by: PHYSICIAN ASSISTANT

## 2020-10-14 PROCEDURE — 80048 BASIC METABOLIC PNL TOTAL CA: CPT

## 2020-10-14 PROCEDURE — 97535 SELF CARE MNGMENT TRAINING: CPT

## 2020-10-14 PROCEDURE — 85018 HEMOGLOBIN: CPT

## 2020-10-14 PROCEDURE — 2580000003 HC RX 258: Performed by: INTERNAL MEDICINE

## 2020-10-14 PROCEDURE — 86901 BLOOD TYPING SEROLOGIC RH(D): CPT

## 2020-10-14 PROCEDURE — 6370000000 HC RX 637 (ALT 250 FOR IP): Performed by: INTERNAL MEDICINE

## 2020-10-14 PROCEDURE — 86923 COMPATIBILITY TEST ELECTRIC: CPT

## 2020-10-14 PROCEDURE — 36592 COLLECT BLOOD FROM PICC: CPT

## 2020-10-14 PROCEDURE — 2700000000 HC OXYGEN THERAPY PER DAY

## 2020-10-14 PROCEDURE — 85014 HEMATOCRIT: CPT

## 2020-10-14 PROCEDURE — 6360000002 HC RX W HCPCS: Performed by: INTERNAL MEDICINE

## 2020-10-14 PROCEDURE — 99222 1ST HOSP IP/OBS MODERATE 55: CPT | Performed by: PHYSICAL MEDICINE & REHABILITATION

## 2020-10-14 PROCEDURE — 99232 SBSQ HOSP IP/OBS MODERATE 35: CPT | Performed by: INTERNAL MEDICINE

## 2020-10-14 PROCEDURE — 2060000000 HC ICU INTERMEDIATE R&B

## 2020-10-14 PROCEDURE — 86850 RBC ANTIBODY SCREEN: CPT

## 2020-10-14 PROCEDURE — P9016 RBC LEUKOCYTES REDUCED: HCPCS

## 2020-10-14 PROCEDURE — C9113 INJ PANTOPRAZOLE SODIUM, VIA: HCPCS | Performed by: INTERNAL MEDICINE

## 2020-10-14 PROCEDURE — 36430 TRANSFUSION BLD/BLD COMPNT: CPT

## 2020-10-14 PROCEDURE — 36415 COLL VENOUS BLD VENIPUNCTURE: CPT

## 2020-10-14 PROCEDURE — 83735 ASSAY OF MAGNESIUM: CPT

## 2020-10-14 PROCEDURE — 86900 BLOOD TYPING SEROLOGIC ABO: CPT

## 2020-10-14 PROCEDURE — 85027 COMPLETE CBC AUTOMATED: CPT

## 2020-10-14 RX ORDER — POTASSIUM CHLORIDE 750 MG/1
20 TABLET, FILM COATED, EXTENDED RELEASE ORAL 3 TIMES DAILY
Status: COMPLETED | OUTPATIENT
Start: 2020-10-14 | End: 2020-10-15

## 2020-10-14 RX ORDER — 0.9 % SODIUM CHLORIDE 0.9 %
20 INTRAVENOUS SOLUTION INTRAVENOUS ONCE
Status: COMPLETED | OUTPATIENT
Start: 2020-10-14 | End: 2020-10-14

## 2020-10-14 RX ORDER — HYDROXYZINE HYDROCHLORIDE 25 MG/1
25 TABLET, FILM COATED ORAL 3 TIMES DAILY PRN
Status: DISCONTINUED | OUTPATIENT
Start: 2020-10-14 | End: 2020-10-20 | Stop reason: HOSPADM

## 2020-10-14 RX ORDER — CEFUROXIME AXETIL 500 MG/1
250 TABLET ORAL EVERY 12 HOURS SCHEDULED
Status: COMPLETED | OUTPATIENT
Start: 2020-10-14 | End: 2020-10-15

## 2020-10-14 RX ORDER — FUROSEMIDE 10 MG/ML
40 INJECTION INTRAMUSCULAR; INTRAVENOUS 2 TIMES DAILY
Status: DISCONTINUED | OUTPATIENT
Start: 2020-10-14 | End: 2020-10-15

## 2020-10-14 RX ORDER — POTASSIUM CHLORIDE 7.45 MG/ML
10 INJECTION INTRAVENOUS ONCE
Status: COMPLETED | OUTPATIENT
Start: 2020-10-14 | End: 2020-10-14

## 2020-10-14 RX ADMIN — Medication 10 ML: at 09:06

## 2020-10-14 RX ADMIN — CARVEDILOL 25 MG: 25 TABLET, FILM COATED ORAL at 09:03

## 2020-10-14 RX ADMIN — Medication 10 ML: at 20:13

## 2020-10-14 RX ADMIN — PANTOPRAZOLE SODIUM 40 MG: 40 INJECTION, POWDER, FOR SOLUTION INTRAVENOUS at 09:04

## 2020-10-14 RX ADMIN — POTASSIUM CHLORIDE 10 MEQ: 7.46 INJECTION, SOLUTION INTRAVENOUS at 09:04

## 2020-10-14 RX ADMIN — SPIRONOLACTONE 25 MG: 25 TABLET, FILM COATED ORAL at 09:03

## 2020-10-14 RX ADMIN — Medication 5 ML: at 11:51

## 2020-10-14 RX ADMIN — CEFUROXIME AXETIL 500 MG: 500 TABLET ORAL at 09:04

## 2020-10-14 RX ADMIN — ROSUVASTATIN CALCIUM 40 MG: 40 TABLET, FILM COATED ORAL at 17:24

## 2020-10-14 RX ADMIN — SODIUM CHLORIDE 20 ML: 9 INJECTION, SOLUTION INTRAVENOUS at 17:53

## 2020-10-14 RX ADMIN — CARVEDILOL 25 MG: 25 TABLET, FILM COATED ORAL at 17:24

## 2020-10-14 RX ADMIN — CLONIDINE HYDROCHLORIDE 0.2 MG: 0.1 TABLET ORAL at 09:04

## 2020-10-14 RX ADMIN — FUROSEMIDE 40 MG: 10 INJECTION, SOLUTION INTRAMUSCULAR; INTRAVENOUS at 09:04

## 2020-10-14 RX ADMIN — INSULIN GLARGINE 50 UNITS: 100 INJECTION, SOLUTION SUBCUTANEOUS at 20:41

## 2020-10-14 RX ADMIN — ISOSORBIDE MONONITRATE 60 MG: 60 TABLET, EXTENDED RELEASE ORAL at 09:03

## 2020-10-14 RX ADMIN — INSULIN LISPRO 18 UNITS: 100 INJECTION, SOLUTION INTRAVENOUS; SUBCUTANEOUS at 11:53

## 2020-10-14 RX ADMIN — POTASSIUM CHLORIDE 20 MEQ: 750 TABLET, EXTENDED RELEASE ORAL at 09:04

## 2020-10-14 RX ADMIN — INSULIN LISPRO 6 UNITS: 100 INJECTION, SOLUTION INTRAVENOUS; SUBCUTANEOUS at 11:52

## 2020-10-14 RX ADMIN — POTASSIUM CHLORIDE 20 MEQ: 750 TABLET, EXTENDED RELEASE ORAL at 20:13

## 2020-10-14 RX ADMIN — ACETAMINOPHEN 650 MG: 325 TABLET, FILM COATED ORAL at 20:13

## 2020-10-14 RX ADMIN — POTASSIUM CHLORIDE 20 MEQ: 750 TABLET, EXTENDED RELEASE ORAL at 13:19

## 2020-10-14 RX ADMIN — FUROSEMIDE 40 MG: 10 INJECTION, SOLUTION INTRAMUSCULAR; INTRAVENOUS at 17:24

## 2020-10-14 RX ADMIN — VERAPAMIL HYDROCHLORIDE 120 MG: 240 TABLET, FILM COATED, EXTENDED RELEASE ORAL at 20:13

## 2020-10-14 RX ADMIN — CLONIDINE HYDROCHLORIDE 0.2 MG: 0.1 TABLET ORAL at 20:13

## 2020-10-14 RX ADMIN — CEFUROXIME AXETIL 250 MG: 500 TABLET ORAL at 20:13

## 2020-10-14 RX ADMIN — CLONIDINE HYDROCHLORIDE 0.2 MG: 0.1 TABLET ORAL at 13:19

## 2020-10-14 RX ADMIN — INSULIN LISPRO 18 UNITS: 100 INJECTION, SOLUTION INTRAVENOUS; SUBCUTANEOUS at 09:06

## 2020-10-14 RX ADMIN — INSULIN LISPRO 8 UNITS: 100 INJECTION, SOLUTION INTRAVENOUS; SUBCUTANEOUS at 09:05

## 2020-10-14 SDOH — SOCIAL STABILITY: SOCIAL INSECURITY: WITHIN THE LAST YEAR, HAVE YOU BEEN AFRAID OF YOUR PARTNER OR EX-PARTNER?: NO

## 2020-10-14 SDOH — ECONOMIC STABILITY: TRANSPORTATION INSECURITY
IN THE PAST 12 MONTHS, HAS LACK OF TRANSPORTATION KEPT YOU FROM MEETINGS, WORK, OR FROM GETTING THINGS NEEDED FOR DAILY LIVING?: NO

## 2020-10-14 SDOH — ECONOMIC STABILITY: TRANSPORTATION INSECURITY
IN THE PAST 12 MONTHS, HAS THE LACK OF TRANSPORTATION KEPT YOU FROM MEDICAL APPOINTMENTS OR FROM GETTING MEDICATIONS?: NO

## 2020-10-14 SDOH — SOCIAL STABILITY: SOCIAL INSECURITY: WITHIN THE LAST YEAR, HAVE YOU BEEN HUMILIATED OR EMOTIONALLY ABUSED IN OTHER WAYS BY YOUR PARTNER OR EX-PARTNER?: NO

## 2020-10-14 SDOH — HEALTH STABILITY: PHYSICAL HEALTH: ON AVERAGE, HOW MANY MINUTES DO YOU ENGAGE IN EXERCISE AT THIS LEVEL?: 0 MIN

## 2020-10-14 SDOH — HEALTH STABILITY: PHYSICAL HEALTH: ON AVERAGE, HOW MANY DAYS PER WEEK DO YOU ENGAGE IN MODERATE TO STRENUOUS EXERCISE (LIKE A BRISK WALK)?: 0 DAYS

## 2020-10-14 SDOH — SOCIAL STABILITY: SOCIAL NETWORK: HOW OFTEN DO YOU ATTENT MEETINGS OF THE CLUB OR ORGANIZATION YOU BELONG TO?: NEVER

## 2020-10-14 SDOH — HEALTH STABILITY: MENTAL HEALTH
STRESS IS WHEN SOMEONE FEELS TENSE, NERVOUS, ANXIOUS, OR CAN'T SLEEP AT NIGHT BECAUSE THEIR MIND IS TROUBLED. HOW STRESSED ARE YOU?: ONLY A LITTLE

## 2020-10-14 SDOH — ECONOMIC STABILITY: FOOD INSECURITY: WITHIN THE PAST 12 MONTHS, THE FOOD YOU BOUGHT JUST DIDN'T LAST AND YOU DIDN'T HAVE MONEY TO GET MORE.: NEVER TRUE

## 2020-10-14 SDOH — SOCIAL STABILITY: SOCIAL INSECURITY
WITHIN THE LAST YEAR, HAVE YOU BEEN KICKED, HIT, SLAPPED, OR OTHERWISE PHYSICALLY HURT BY YOUR PARTNER OR EX-PARTNER?: NO

## 2020-10-14 SDOH — SOCIAL STABILITY: SOCIAL NETWORK: HOW OFTEN DO YOU GET TOGETHER WITH FRIENDS OR RELATIVES?: MORE THAN THREE TIMES A WEEK

## 2020-10-14 SDOH — SOCIAL STABILITY: SOCIAL NETWORK: ARE YOU MARRIED, WIDOWED, DIVORCED, SEPARATED, NEVER MARRIED, OR LIVING WITH A PARTNER?: LIVING WITH PARTNER

## 2020-10-14 SDOH — SOCIAL STABILITY: SOCIAL NETWORK
DO YOU BELONG TO ANY CLUBS OR ORGANIZATIONS SUCH AS CHURCH GROUPS UNIONS, FRATERNAL OR ATHLETIC GROUPS, OR SCHOOL GROUPS?: NO

## 2020-10-14 SDOH — ECONOMIC STABILITY: FOOD INSECURITY: WITHIN THE PAST 12 MONTHS, YOU WORRIED THAT YOUR FOOD WOULD RUN OUT BEFORE YOU GOT MONEY TO BUY MORE.: NEVER TRUE

## 2020-10-14 SDOH — ECONOMIC STABILITY: INCOME INSECURITY: HOW HARD IS IT FOR YOU TO PAY FOR THE VERY BASICS LIKE FOOD, HOUSING, MEDICAL CARE, AND HEATING?: NOT HARD AT ALL

## 2020-10-14 SDOH — SOCIAL STABILITY: SOCIAL NETWORK
IN A TYPICAL WEEK, HOW MANY TIMES DO YOU TALK ON THE PHONE WITH FAMILY, FRIENDS, OR NEIGHBORS?: MORE THAN THREE TIMES A WEEK

## 2020-10-14 SDOH — SOCIAL STABILITY: SOCIAL INSECURITY
WITHIN THE LAST YEAR, HAVE TO BEEN RAPED OR FORCED TO HAVE ANY KIND OF SEXUAL ACTIVITY BY YOUR PARTNER OR EX-PARTNER?: NO

## 2020-10-14 SDOH — SOCIAL STABILITY: SOCIAL NETWORK: HOW OFTEN DO YOU ATTEND CHURCH OR RELIGIOUS SERVICES?: 1 TO 4 TIMES PER YEAR

## 2020-10-14 ASSESSMENT — ENCOUNTER SYMPTOMS
ABDOMINAL PAIN: 1
SHORTNESS OF BREATH: 0
VOMITING: 0
EYE PAIN: 0
COLOR CHANGE: 0
ABDOMINAL DISTENTION: 0
DIARRHEA: 0
COUGH: 0
BACK PAIN: 1
SHORTNESS OF BREATH: 1
NAUSEA: 0
PHOTOPHOBIA: 0
COLOR CHANGE: 1
CHEST TIGHTNESS: 0
WHEEZING: 0
CONSTIPATION: 0

## 2020-10-14 ASSESSMENT — PAIN SCALES - GENERAL
PAINLEVEL_OUTOF10: 0
PAINLEVEL_OUTOF10: 0
PAINLEVEL_OUTOF10: 3
PAINLEVEL_OUTOF10: 0

## 2020-10-14 ASSESSMENT — CROHNS DISEASE ACTIVITY INDEX (CDAI): CDAI SCORE: 0

## 2020-10-14 NOTE — PROGRESS NOTES
Physical Therapy Med Surg Daily Treatment Note  Facility/Department: [address] [facility]  Room: [place] [address]       NAME: Jossy Henson  : 1945 (88 y.o.)  MRN: [id number]  CODE STATUS: Full Code    Date of Service: 10/14/2020    Patient Diagnosis(es): Sepsis [facility]) [A41.9]   Chief Complaint   Patient presents with    Abdominal Pain     Patient Active Problem List    Diagnosis Date Noted    Cardiopulmonary arrest [facility])      Priority: High    Lumbosacral radiculopathy at L5 2015     Priority: High     Class: Acute    Spinal stenosis of lumbar region with neurogenic claudication 2015     Priority: High     Class: Chronic    High risk medication use-Norco - 17 OARRS PM&R, 18 OARRS PM&R, 18 OARRS PM&R, Urine Drug screen negative 17 PM&R--MED CONTRACT 2014     Priority: High    Sepsis ([address] 10/06/2020    Major depressive disorder in remission ([address] 10/06/2020    Gastroesophageal reflux disease without esophagitis 10/06/2020    Acute cystitis without hematuria 10/06/2020    CKD (chronic kidney disease) stage 4, GFR 15-29 ml/min ([address] 10/06/2020    Adenomatous polyp of sigmoid colon     Adenomatous polyp of transverse colon     Acute encephalopathy     Flash pulmonary edema ([address] 2020    Acute on chronic kidney failure ([address] 2020    Dysarthria     Chronic fatigue     Symptomatic anemia 2020    COPD exacerbation ([address] 2020    Anemia     AVM (arteriovenous malformation)     Gastrointestinal hemorrhage 2020    HOCM (hypertrophic obstructive cardiomyopathy) ([address] 2019    Hypoglycemia     SAÚL (acute kidney injury) ([address] 10/23/2019    Acute on chronic diastolic (congestive) heart failure ([address] 10/13/2019    Chronic obstructive pulmonary disease with acute exacerbation ([address] 10/12/2019    Hypertensive urgency 10/09/2019    Uncontrolled type 2 diabetes mellitus with hyperglycemia ([address]     Acute on chronic diastolic heart failure ([address] 10/08/2019    CHF (congestive heart failure) ([facility]     PMB (postmenopausal bleeding)     Acute combined systolic and diastolic CHF, NYHA class 1 ([address] 03/24/2019    Osteoarthritis of spine with radiculopathy, lumbar region 11/07/2018    Myalgia 05/11/2018    Intercostal neuropathy 05/11/2018    Diabetic asymmetric polyneuropathy ([address] 04/11/2017    Cervical radicular pain 12/03/2016    Reactive depression 07/15/2016    Diabetic radiculopathy ([address] 07/15/2016    Insomnia secondary to chronic pain 04/15/2016    Non-compliant patient NO showed FU 1/10/17 Dr Stephen Mott 09/24/2015    Impaired mobility and activities of daily living 03/28/2015    Neck pain 03/04/2015    Artificial lens present 10/03/2014    Presbyopia 10/03/2014    Astigmatism, regular 10/03/2014    Cataract, nuclear sclerotic senile 10/03/2014    IDDM (insulin dependent diabetes mellitus) 10/03/2014    Regular astigmatism 10/03/2014    Nuclear senile cataract 10/03/2014    Memory deficit 06/04/2014    SOB (shortness of breath) on exertion 03/14/2014    Chest pain 03/14/2014    CTS (carpal tunnel syndrome) 02/09/2014    DJD (degenerative joint disease), lumbar 02/08/2014    Lumbosacral radiculopathy at S1 02/08/2014    DDD (degenerative disc disease), lumbar 02/08/2014    Dysphonia 02/08/2014    Insomnia 12/04/2013    Smoker 06/18/2013    Hypertension     Hyperlipidemia     Uncontrolled type 2 diabetes mellitus with complication, without long-term current use of insulin ([facility])     Fibromyalgia     Anxiety         Past Medical History:   Diagnosis Date    Anxiety     Asthma     dx 2019 / has smoked since age 14    CHF (congestive heart failure) ([facility]     Chronic back pain     Bilateral L5 S1 Radic on emg--surprisingly worse on the left than the right--pt's symptoms and her MRI show worse on the right    Chronic obstructive pulmonary disease with acute exacerbation ([address] 10/12/2019    Depression     Fibromyalgia     Hyperlipidemia     meds > 8 yrs    Hypertension     meds > 45 yrs    On home O2     2l per n/c at bedtime mostly,     Osteoarthritis     Type II diabetes mellitus, uncontrolled ([address]     hx > 8 yrs    Unspecified sleep apnea      Past Surgical History:   Procedure Laterality Date    BACK SURGERY  2017    lumbar disc    CARDIAC CATHETERIZATION  11/3/14     DR. MIRELES / no stents    COLONOSCOPY  08/29/2016    w/polypectomy     COLONOSCOPY N/A 9/29/2020    COLONOSCOPY WITH POLYPECTOMY performed by Karen Isaac MD at [facility] N/A 10/7/2019    EUA HYSTEROSCOPY DILATATION AND CURETTAGE performed by Steve Long DO at [facility]. [address], COLON, DIAGNOSTIC      EYE SURGERY      [place] with IOL OU / [address] [place] [address]    umbilical hernia repair    AR ESOPHAGOGASTRODUODENOSCOPY TRANSORAL DIAGNOSTIC N/A 3/24/2017    EGD ESOPHAGOGASTRODUODENOSCOPY performed by Yasmin Mireles MD at [place] [address] 2/8/2018    negative findings    AR REVISE MEDIAN N/CARPAL TUNNEL SURG Left 6/5/2017    LEFT  CARPAL TUNNEL RELEASE performed by Soledad Wang MD at [facility],1+C/O,[facility] N/A 2/8/2018    TONSILLECTOMY      as child    UPPER GASTROINTESTINAL ENDOSCOPY  08/26/2016    w/bx     UPPER GASTROINTESTINAL ENDOSCOPY N/A 2/25/2020    EGD possible biopsy performed by Karen Isaac MD at [address] [place] [address] 7/1/2020    EGD PUSH ENTEROSCOPY performed by Ramila Shields MD at [facility]       Restrictions  Restrictions/Precautions: Fall Risk    SUBJECTIVE   General  Chart Reviewed: Yes  Family / Caregiver Present: No  Subjective  Subjective: \"I feel more tired today. \"    Pre-Session Pain Report  Pre Treatment Pain Screening  Pain at present: 0  Scale Used: Numeric Score  Intervention List: Patient able to continue with treatment  Pain Screening  Patient Currently in Pain: No       Post-Session Pain Report  Pain Assessment  Pain Assessment: 0-10  Pain Level: 0         OBJECTIVE        Bed mobility  Supine to Sit: Contact guard assistance  Sit to Supine: Contact guard assistance    Transfers  Sit to Stand: Contact guard assistance  Stand to sit: Contact guard assistance  Comment: Standing tolerance of 1 min. before sitting due to fatigue. Ambulation  Ambulation?: Yes  Ambulation 1  Surface: level tile  Device: Rolling Walker  Assistance: Contact guard assistance  Quality of Gait: Side stepping at EOB, shuffling pattern  Distance: 3ft down, 3 ft back. Comments: Poor posture, fwd flexed trunk, rapid fatigue. Stairs/Curb  Stairs?: No                                    Activity Tolerance  Activity Tolerance: Patient limited by fatigue          ASSESSMENT   Assessment: pt motivated but fatigues quickly. pt declines to sit in the chair this date. Discharge Recommendations:  Continue to assess pending progress    Goals  Long term goals  Long term goal 1: Bed mobility with indep  Long term goal 2: Functional transfers with indep  Long term goal 3: Amb 50ft with LRAD and indep  Long term goal 4: 4 steps with handrail and SBA to enter/exit home  Patient Goals   Patient goals : \"return home\"    PLAN    Times per week: 3-6  Safety Devices  Type of devices: Left in bed, Call light within reach, Bed alarm in place     AMPAC (6 CLICK) BASIC MOBILITY  AM-PAC Inpatient Mobility Raw Score : 15      Therapy Time   Individual   Time In 1124   Time Out 1138   Minutes 14      BM/Trsf: 10  Gait: [address] Met[address] Roscoe, PTA, 10/14/20 at 12:43 PM         Definitions for assistance levels  Independent = pt does not require any physical supervision or assistance from another person for activity completion. Device may be needed.   Stand by assistance = pt requires verbal cues or instructions from another person, close to but not touching, to perform the activity  Minimal assistance= pt performs 75% or more of the activity; assistance is required to complete the activity  Moderate assistance= pt performs 50% of the activity; assistance is required to complete the activity  Maximal assistance = pt performs 25% of the activity; assistance is required to complete the activity  Dependent = pt requires total physical assistance to accomplish the task

## 2020-10-14 NOTE — PROGRESS NOTES
H & H 6.8/20.9. Perfect serve sent to Dr. Tyrone Franklin. He received message. On unit and he will contact GI.

## 2020-10-14 NOTE — PROGRESS NOTES
Nephrology Progress Note    Assessment:  CKD with SAÚL related to sepsis   DM type-2 uncontrolled  OHDX HCM  Hypertension better control  Overweight   Hypokalemia d/t diuretic        Plan:repl;acev potassium as ordered this morning  Follow in Rehab  Bmp to be followed  Increase activity  Talked with  this morning in [place]    Patient Active Problem List:     Hypertension     Hyperlipidemia     Uncontrolled type 2 diabetes mellitus with complication, without long-term current use of insulin (HCC)     Fibromyalgia     Anxiety     Smoker     Insomnia     DJD (degenerative joint disease), lumbar     Lumbosacral radiculopathy at S1     DDD (degenerative disc disease), lumbar     Dysphonia     CTS (carpal tunnel syndrome)     High risk medication use-Norco - 12/20/17 OARRS PM&R, 02/20/18 OARRS PM&R, 03/07/18 OARRS PM&R, Urine Drug screen negative 02/06/17 PM&R--MED CONTRACT 2/6/17     SOB (shortness of breath) on exertion     Chest pain     Memory deficit     Artificial lens present     Presbyopia     Astigmatism, regular     Cataract, nuclear sclerotic senile     IDDM (insulin dependent diabetes mellitus)     Regular astigmatism     Nuclear senile cataract     Neck pain     Lumbosacral radiculopathy at L5     Spinal stenosis of lumbar region with neurogenic claudication     Impaired mobility and activities of daily living     Non-compliant patient NO showed FU 1/10/17 Dr Vesta Mcwilliams     Insomnia secondary to chronic pain     Reactive depression     Diabetic radiculopathy (HCC)     Cervical radicular pain     Diabetic asymmetric polyneuropathy (HCC)     Myalgia     Intercostal neuropathy     Osteoarthritis of spine with radiculopathy, lumbar region     Acute combined systolic and diastolic CHF, NYHA class 1 (HCC)     PMB (postmenopausal bleeding)     Acute on chronic diastolic heart failure (HCC)     CHF (congestive heart failure) ([address]     Hypertensive urgency     Uncontrolled type 2 diabetes mellitus with Infusions:   dextrose      dextrose         CBC:   Recent Labs     10/13/20  1408  10/14/20  0600 10/14/20  1235   WBC 11.7*  --  13.5*  --    HGB 7.4*  7.3*   < > 7.2* 6.8*     --  156  --     < > = values in this interval not displayed. CMP:    Recent Labs     10/13/20  0400 10/13/20  1408 10/14/20  0600    131* 135   K 3.3* 3.5  3.5 2.9*   CL 97 94* 96   CO2 24 27 26   BUN 48* 47* 45*   CREATININE 3.81* 3.80* 3.75*   GLUCOSE 460* 243* 221*   CALCIUM 7.2* 7.3* 8.1*   LABGLOM 11.5* 11.5* 11.7*     Troponin: No results for input(s): TROPONINI in the last 72 hours. BNP: No results for input(s): BNP in the last 72 hours. INR: No results for input(s): INR in the last 72 hours. Lipids: No results for input(s): CHOL, LDLDIRECT, TRIG, HDL, AMYLASE, LIPASE in the last 72 hours. Liver:   Recent Labs     10/13/20  1408   *   *   ALKPHOS 215*   PROT 4.7*   LABALBU 2.6*   BILITOT 0.4     Iron:  No results for input(s): IRONS, FERRITIN in the last 72 hours. Invalid input(s): LABIRONS  Urinalysis: No results for input(s): UA in the last 72 hours.     Objective:  Vitals: BP (!) 124/46   Pulse 80   Temp 98.8 °F (37.1 °C) (Oral)   Resp 18   Ht 4' 11\" (1.499 m)   Wt 160 lb 7.9 oz (72.8 kg)   LMP  (LMP Unknown)   SpO2 100%   BMI 32.42 kg/m²    Wt Readings from Last 3 Encounters:   10/10/20 160 lb 7.9 oz (72.8 kg)   09/29/20 130 lb (59 kg)   08/24/20 130 lb (59 kg)      24HR INTAKE/OUTPUT:      Intake/Output Summary (Last 24 hours) at 10/14/2020 5900  Last data filed at 10/14/2020 5784  Gross per 24 hour   Intake --   Output 3600 ml   Net -3600 ml       General: alert, in no apparent distress  HEENT: normocephalic, atraumatic, anicteric  Neck: supple, no mass  Lungs: non-labored respirations, clear to auscultation bilaterally  Heart: regular rate and rhythm, 1/6 systolic murmurs or rubs  Abdomen: soft, non-tender, non-distended  Ext: no cyanosis, no peripheral edema  Neuro: alert and oriented, no gross abnormalities  Psych: normal mood and affect  Skin: no rash      Electronically signed by Siena Adler MD

## 2020-10-14 NOTE — CONSULTS
Physical Medicine & Rehabilitation  Consult Note      Admitting Physician: Lucy Benitez DO    Primary Care Provider: Castillo Flores MD     Reason for Consult:  Asses rehab needs, promote physical and mental function, and decrease likelihood of re-admit to the hospital after discharge. History of Present Illness:    Kaylie Mosher is a 67 y.o. female admitted to [facility] on 10/5/2020. She was originally admitted with fever chills diagnosed with sepsis and a UTI, hospital course has been complicated by a cardiac arrest on 10/7 wth prolonged CPR and  post arrest emergency PPM insertion. Patient  Is on rare Norco and   Lyrica-which helps her pain--no side effects--no signs of abuse--is more functional with the meds and has possitive interactions with friends and family. She understands that these medications may be long-term because of her severe unrelenting arthritic pain. Abdominal Pain   This is a recurrent (dt her diabetes) problem. The current episode started more than 1 year ago. The onset quality is undetermined. The problem occurs constantly. The problem has been unchanged. The pain is located in the generalized abdominal region, LUQ and LLQ. The pain is at a severity of 2/10. The pain is severe. The quality of the pain is aching and dull. The abdominal pain does not radiate. Associated symptoms include arthralgias, headaches and myalgias. Pertinent negatives include no constipation, diarrhea, dysuria, fever, frequency, hematuria, nausea or vomiting. Nothing aggravates the pain. The pain is relieved by nothing. She has tried nothing for the symptoms. The treatment provided no relief. Prior diagnostic workup includes GI consult, upper endoscopy and lower endoscopy. There is no history of abdominal surgery, colon cancer, Crohn's disease, gallstones, GERD, irritable bowel syndrome, pancreatitis, PUD or ulcerative colitis. Back Pain   This is a chronic problem. The current episode started more than 1 year ago. The problem occurs constantly. The problem is unchanged. The pain is present in the gluteal, lumbar spine and sacro-iliac. The quality of the pain is described as aching. The pain is at a severity of 6/10. Associated symptoms include abdominal pain, headaches and numbness (Left hand). Pertinent negatives include no chest pain, dysuria, fever, pelvic pain or weakness. Risk factors include sedentary lifestyle, menopause and lack of exercise. She has tried analgesics, heat, home exercises, muscle relaxant, NSAIDs and walking for the symptoms. The treatment provided moderate relief. I reviewed recent nursing notes, \"  Reviewed, ongoing, continue present plan of care. \". Their inpatient work up has included:    Imaging:    Imaging and other studies reviewed and discussed with patient and staff    Ct Abdomen Pelvis   10/6/2020  FINDINGS: A moderate amount of stool is present within the colon and rectum. There is no abnormal small bowel dilatation, significant inflammation, ascites, lymphadenopathy is, or other significant change from the prior study identified. The unenhanced liver, gallbladder, pancreas, spleen, adrenal glands, kidneys, uterus, adnexa, urinary bladder, and additional images of pelvis appear within normal limits. Moderate atherosclerotic plaquing of a normal caliber abdominal aorta and branch vessels is again noted. Minimal probable atelectasis is noted of the visualized lung bases. Moderately extensive degenerative changes of the thoracolumbar spine with grade 1 anterolisthesis of L4 over L5 is again noted. NO ACUTE INTRA-ABDOMINAL PROCESS OR SIGNIFICANT CHANGE FROM 8/24/2020 IDENTIFIED. Ct Head   10/9/2020 Prominence of the sulci and ventricles compatible with mild generalized parenchymal volume loss.  Areas of bilateral supratentorial white matter hypoattenuation are nonspecific but most likely related to chronic small vessel ischemic changes in a patient of this age. Remote left thalamic lacunar infarct. Remote posterior left frontal/parietal lacunar infarct Gray-white matter differentiation is preserved. No acute hemorrhage or abnormal extra-axial fluid collection. No mass effect or midline shift. The visualized paranasal sinuses and mastoid air cells are clear. Calvarium is intact. No acute intracranial process or significant interval change. Ct Head   10/7/2020  CT Brain Contrast medium:  Not utilized. History:  Cardiac arrest Comparison:  CT brain, August 25, 2020, August 24, 2020. MRI brain, August 27, 2020. Findings: Extra-axial spaces:  Normal. Intracranial hemorrhage:  None. Ventricular system: Ventricles mildly enlarged. Sulci mildly prominent. Basal Cisterns:  Normal. Cerebral Parenchyma: 4 mm area decreased attenuation exerting no mass effect left thalamus. Midline Shift:  None. Cerebellum:  Normal. Paranasal sinuses and mastoid air cells: Opacification bilateral ethmoid sinuses. Visualized Orbits:  Normal.     Impression: Mild cerebral atrophy. Remote left thalamic lacunar infarct. Ethmoid sinusitis. Xr Chest   10/10/2020  ET tube and nasogastric tube remain in satisfactory position. Persistent to increased basilar markings bilaterally with no significant change. NO CHANGE     Xr Chest   10/8/2020: An endotracheal tube has been mildly withdrawn with its tip approximately 3 cm above the mojgan. There is been interval placement of an orogastric tube with its tip overlying the body of the stomach. Shallow inspiratory volumes are present with mild to moderate airspace infiltrates and bandlike opacities, predominantly of the mid to lower lung fields. A very small right pleural effusion is suggested. There is no significant left pleural effusion, pneumothorax, or displaced fractures identified.  The heart remains mildly enlarged with mild vascular congestion, versus technique     ENDOTRACHEAL AND OROGASTRIC TUBES IN EXPECTED POSITION. POSSIBLE MILD CARDIAC DECOMPENSATION, WITH MILD TO MODERATE ATELECTASIS/EDEMA OF THE MID TO LOWER LUNG [place]. BRONCHOPNEUMONIA SHOULD BE EXCLUDED CLINICALLY. SUSPECT VERY SMALL RIGHT PLEURAL EFFUSION. Xr Chest   10/7/2020 : Single  views of the chest is submitted. There is an endotracheal tube. The tip lies at the orifice of the right mainstem bronchus. Repositioning is recommended. The cardiac silhouette is enlarged. Pulmonary vascular unremarkable. Right sided trachea. No focal infiltrates. No Pneumothoraces. THE TIP OF THE ENDOTRACHEAL TUBE LIES AT THE ORIFICE THE RIGHT MAINSTEM BRONCHUS. REPOSITIONING RECOMMENDED    Xr Chest  10/6/2020  EXAMINATION: XR CHEST PORTABLE CLINICAL HISTORY: SHORTNESS OF BREATH COMPARISONS: CT CHEST, AUGUST 25, 2020. CHEST RADIOGRAPHS, AUGUST 25, 2020, AUGUST 24, 2020, JUNE 29, 2020. FINDINGS: Osseous structures are intact. Cardiopericardial silhouette is enlarged and unchanged. Pulmonary vasculature is normal. Lungs are clear. STABLE CARDIOMEGALY. Ir Picc  10/10/2020  CONCLUSION: SUCCESSFUL RIGHT PICC PLACEMENT WITHOUT IMMEDIATE COMPLICATIONS.               Labs:     labs reviewed and discussed with patient and staff    Lab Results   Component Value Date    POCGLU 264 10/14/2020    POCGLU 316 10/14/2020    POCGLU 201 10/13/2020    POCGLU 257 10/13/2020    POCGLU 364 10/13/2020     Lab Results   Component Value Date     10/14/2020    K 2.9 10/14/2020    CL 96 10/14/2020    CO2 26 10/14/2020    BUN 45 10/14/2020    CREATININE 3.75 10/14/2020    CALCIUM 8.1 10/14/2020    LABALBU 2.6 10/13/2020    BILITOT 0.4 10/13/2020    ALKPHOS 215 10/13/2020     10/13/2020     10/13/2020     Lab Results   Component Value Date    WBC 13.5 10/14/2020    RBC 2.81 10/14/2020    HGB 7.2 10/14/2020    HCT 21.9 10/14/2020    MCV 78.1 10/14/2020    MCH 25.7 10/14/2020    MCHC 32.9 10/14/2020    RDW 22.1 10/14/2020     10/14/2020    MPV 8.8 08/01/2015     Lab Results   Component Value Date    VITD25 11.5 01/17/2014     Lab Results   Component Value Date    COLORU Yellow 10/05/2020    NITRU POSITIVE 10/05/2020    GLUCOSEU >=1000 10/05/2020    KETUA Negative 10/05/2020    UROBILINOGEN 0.2 10/05/2020    BILIRUBINUR Negative 10/05/2020    BILIRUBINUR neg 09/27/2019     Lab Results   Component Value Date    PROTIME 24.8 10/07/2020     Lab Results   Component Value Date    INR 2.3 10/07/2020         I discussed results with patient. Current Rehabilitation Assessments:    Rehabilitation:  Physical therapy: FIMS:  BedMobility: Scooting: Maximal assistance    Transfers: Sit to Stand: Contact guard assistance  Stand to sit: Contact guard assistance, Ambulation 1  Surface: level tile  Device: Rolling Walker  Assistance: Contact guard assistance  Quality of Gait: Side stepping at EOB, shuffling pattern  Distance: 3ft down, 3 ft back. Comments: Poor posture, fwd flexed trunk, rapid fatigue.,      FIMS: ,  , Assessment: pt motivated but fatigues quickly. pt declines to sit in the chair this date.       Occupational therapy: FIMS:   ,  ,        ADL  Feeding: Setup (10/11/20 1456)  Grooming: Setup(to wash her hands and face) (10/11/20 1456)  UE Dressing: Maximum assistance (10/11/20 1456)  LE Dressing: Dependent/Total (10/11/20 1456)  Toileting: None(Patient has catheter) (10/11/20 1456)  OCCUPATIONAL THERAPY  Hand Dominance: Right  ADL  Feeding: Setup (10/11/20 1456)  Grooming: Setup(to wash her hands and face) (10/11/20 1456)  UE Dressing: Maximum assistance (10/11/20 1456)  LE Dressing: Dependent/Total (10/11/20 1456)  Toileting: None(Patient has catheter) (10/11/20 1456)  Toilet Transfers  Toilet Transfers Comments: guaman in place- no BM needed (10/11/20 1505)            LTG:                 Speech therapy: FIMS:          Past Medical History:          Diagnosis Date    Anxiety     Asthma     dx 2019 / has smoked since age 11    CHF (congestive heart failure) ([address]     Chronic back pain     Bilateral L5 S1 Radic on emg--surprisingly worse on the left than the right--pt's symptoms and her MRI show worse on the right    Chronic obstructive pulmonary disease with acute exacerbation (HCC) 10/12/2019    Depression     Fibromyalgia     Hyperlipidemia     meds > 8 yrs    Hypertension     meds > 45 yrs    On home O2     2l per n/c at bedtime mostly,     Osteoarthritis     Type II diabetes mellitus, uncontrolled ([address]     hx > 8 yrs    Unspecified sleep apnea          PastSurgical History:          Procedure Laterality Date    BACK SURGERY  2017    lumbar disc    CARDIAC CATHETERIZATION  11/3/14     DR. MIRELES / no stents    COLONOSCOPY  08/29/2016    w/polypectomy     COLONOSCOPY N/A 9/29/2020    COLONOSCOPY WITH POLYPECTOMY performed by Nya Cramer MD at [facility] N/A 10/7/2019    EUA HYSTEROSCOPY DILATATION AND CURETTAGE performed by Emy Hernandez DO at [facility]. [address], COLON, DIAGNOSTIC      EYE SURGERY      [place] with IOL OU / [id number]    umbilical hernia repair    LA ESOPHAGOGASTRODUODENOSCOPY TRANSORAL DIAGNOSTIC N/A 3/24/2017    EGD ESOPHAGOGASTRODUODENOSCOPY performed by Juan Daniel Maradiaga MD at [place] [address] 2/8/2018    negative findings    LA REVISE MEDIAN N/CARPAL TUNNEL SURG Left 6/5/2017    LEFT  CARPAL TUNNEL RELEASE performed by Lian Vora MD at [facility],0+W/K,BRLRS N/A 2/8/2018    TONSILLECTOMY      as child    UPPER GASTROINTESTINAL ENDOSCOPY  08/26/2016    w/bx     UPPER GASTROINTESTINAL ENDOSCOPY N/A 2/25/2020    EGD possible biopsy performed by Nya Cramer MD at [address] [place] [address] 7/1/2020    EGD PUSH ENTEROSCOPY performed by Stephanie Andersen MD at [facility]         Allergies:      Allergies   Allergen Reactions    Ibuprofen Nausea Only    Metformin And Related      Diarrhea      Darvon [Propoxyphene Hcl] Nausea And Vomiting        CurrentMedications:     Current Facility-Administered Medications: potassium chloride (KLOR-CON) extended release tablet 20 mEq, 20 mEq, Oral, TID  furosemide (LASIX) injection 40 mg, 40 mg, Intravenous, BID  cefUROXime (CEFTIN) tablet 250 mg, 250 mg, Oral, 2 times per day  glucose (GLUTOSE) 40 % oral gel 15 g, 15 g, Oral, PRN  dextrose 50 % IV solution, 12.5 g, Intravenous, PRN  glucagon (rDNA) injection 1 mg, 1 mg, Intramuscular, PRN  dextrose 5 % solution, 100 mL/hr, Intravenous, PRN  sodium chloride flush 0.9 % injection 10 mL, 10 mL, Intravenous, PRN  insulin glargine (LANTUS) injection vial 50 Units, 50 Units, Subcutaneous, Nightly  insulin lispro (HUMALOG) injection vial 18 Units, 18 Units, Subcutaneous, TID WC  lidocaine viscous hcl (XYLOCAINE) 2 % solution 15 mL, 15 mL, Mouth/Throat, Q3H PRN  cloNIDine (CATAPRES) tablet 0.2 mg, 0.2 mg, Oral, TID  isosorbide mononitrate (IMDUR) extended release tablet 60 mg, 60 mg, Oral, Daily  insulin lispro (HUMALOG) injection vial 0-12 Units, 0-12 Units, Subcutaneous, TID WC  insulin lispro (HUMALOG) injection vial 0-6 Units, 0-6 Units, Subcutaneous, Nightly  carvedilol (COREG) tablet 25 mg, 25 mg, Oral, BID WC  spironolactone (ALDACTONE) tablet 25 mg, 25 mg, Oral, Daily  magic (miracle) mouthwash, 5 mL, Swish & Spit, 4x Daily PRN  0.9 % sodium chloride bolus, 20 mL, Intravenous, Once  labetalol (NORMODYNE;TRANDATE) injection 10 mg, 10 mg, Intravenous, Q6H PRN  verapamil (CALAN SR) extended release tablet 120 mg, 120 mg, Oral, Nightly  potassium chloride 20 mEq/50 mL IVPB (Central Line), 20 mEq, Intravenous, PRN  sodium chloride flush 0.9 % injection 10 mL, 10 mL, Intravenous, 2 times per day  sodium chloride flush 0.9 % injection 10 mL, 10 mL, Intravenous, PRN  promethazine (PHENERGAN) tablet 12.5 mg, 12.5 mg, Oral, Q6H PRN **OR** ondansetron (ZOFRAN) times per year     Active member of club or organization: No     Attends meetings of clubs or organizations: Never     Relationship status: Living with partner    Intimate partner violence     Fear of current or ex partner: No     Emotionally abused: No     Physically abused: No     Forced sexual activity: No   Other Topics Concern    Not on file   Social History Narrative         Lives With: Significant other/Spouse    Type of Home: House    Home Layout: Two level, Performs ADL's on one level    Home Access: Stairs to enter with rails    Entrance Stairs - Number of Steps: 4    Bathroom Shower/Tub: Tub/Shower unit, Doors    Bathroom Equipment: Shower chair, Grab bars in [place]: Rolling walker, Cane, Oxygen(uses her O2 very seldom)    ADL Assistance: Needs assistance    Homemaking Assistance: Needs assistance    Homemaking Responsibilities: No(spouse performs)    Ambulation Assistance: Independent    Transfer Assistance: Independent    Active : No    Occupation: Retired    Type of occupation: worked in [employer]: patient enjoys [employer]          Family History:         Problem Relation Age of Onset    Heart Disease Father         cardiac bypass    Arthritis Father     Arthritis Mother     Other Mother          at age 80    Other Sister         [facility]    No Known Problems Daughter     Stroke Son        Review of Systems:    Review of Systems   Constitutional: Positive for activity change, appetite change and fatigue. Negative for fever and unexpected weight change. HENT: Positive for dental problem. Eyes: Negative for photophobia and pain. Respiratory: Positive for shortness of breath. Negative for cough, chest tightness and wheezing. Cardiovascular: Negative for chest pain, palpitations and leg swelling. Gastrointestinal: Positive for abdominal pain. Negative for abdominal distention, constipation, diarrhea, nausea and vomiting. Endocrine: Negative. Genitourinary: Negative for difficulty urinating, dysuria, frequency, hematuria, pelvic pain, urgency and vaginal discharge. Musculoskeletal: Positive for arthralgias, back pain, gait problem and myalgias. Negative for joint swelling and neck pain. Skin: Positive for color change. Allergic/Immunologic: Negative for environmental allergies and food allergies. Neurological: Positive for numbness (Left hand) and headaches. Negative for dizziness, weakness and light-headedness. Psychiatric/Behavioral: Positive for dysphoric mood and sleep disturbance. Negative for hallucinations. The patient is nervous/anxious. Physical Exam:    BP (!) 157/60   Pulse 75   Temp 97.5 °F (36.4 °C) (Oral)   Resp 18   Ht 4' 11\" (1.499 m)   Wt 160 lb 7.9 oz (72.8 kg)   LMP  (LMP Unknown)   SpO2 100%   BMI 32.42 kg/m²      Physical Exam  Vitals signs reviewed. Constitutional:       General: She is not in acute distress. Appearance: She is well-developed. She is not ill-appearing, toxic-appearing or diaphoretic. Comments:         HENT:      Head: Normocephalic and atraumatic. Right Ear: Hearing normal.      Left Ear: Hearing normal.      Nose: Nose normal.      Mouth/Throat:      Mouth: No oral lesions. Dentition: Normal dentition. Pharynx: No oropharyngeal exudate. Eyes:      General: No scleral icterus. Right eye: No discharge. Left eye: No discharge. Conjunctiva/sclera: Conjunctivae normal.      Right eye: No chemosis or exudate. Left eye: No chemosis or exudate. Pupils: Pupils are equal, round, and reactive to light. Neck:      Musculoskeletal: Normal range of motion and neck supple. No edema or neck rigidity. Thyroid: No thyromegaly. Vascular: No JVD. Trachea: No tracheal deviation. Cardiovascular:      Rate and Rhythm: Normal rate and regular rhythm. Pulses: No decreased pulses.       Heart sounds: Normal heart sounds. No murmur. No friction rub. No gallop. Pulmonary:      Effort: Pulmonary effort is normal. No tachypnea, bradypnea, accessory muscle usage or respiratory distress. Breath sounds: Decreased breath sounds present. No wheezing or rales. Chest:      Chest wall: No tenderness. Abdominal:      General: Bowel sounds are normal. There is no distension. Palpations: Abdomen is soft. There is no mass. Tenderness: There is no abdominal tenderness. There is no guarding or rebound. Comments: Area of pain and hypersensitivity   Musculoskeletal:         General: Tenderness present. Right shoulder: Normal.      Left shoulder: Normal.      Right elbow: Normal.     Left elbow: Normal.      Right wrist: Normal.      Left wrist: Normal.      Right hip: Normal.      Left hip: Normal.      Right knee: She exhibits decreased range of motion and swelling. Tenderness found. Medial joint line tenderness noted. Left knee: She exhibits decreased range of motion. Tenderness found. Medial joint line and lateral joint line tenderness noted. Right ankle: Normal. Achilles tendon normal.      Left ankle: Normal. Achilles tendon normal.      Cervical back: She exhibits decreased range of motion, tenderness, pain and spasm. Thoracic back: She exhibits decreased range of motion, tenderness, pain and spasm. Lumbar back: She exhibits decreased range of motion, tenderness, bony tenderness and pain. She exhibits no swelling, no edema, no deformity, no laceration and normal pulse. Back:       Right upper arm: Normal.      Left upper arm: Normal.      Right forearm: Normal.      Left forearm: Normal.        Arms:       Right hand: Normal. Normal strength noted. Left hand: Decreased sensation noted. Decreased sensation is present in the medial distribution. Normal strength noted.       Right upper leg: Normal.      Left upper leg: Normal.      Right lower leg: Normal.      Left lower leg: Normal.        Legs:       Right foot: Normal.      Left foot: Normal.        Feet:       Comments: Tender areas are indicated by numbered spot    Less tender    Numbness left flank             Skin:     General: Skin is warm and dry. Coloration: Skin is not pale. Findings: No abrasion, bruising, ecchymosis, erythema, laceration, petechiae or rash. Rash is not macular, pustular or urticarial.      Nails: There is no clubbing. Comments: PVD discoloration bilateral lower extremities   Neurological:      Mental Status: She is alert and oriented to person, place, and time. Cranial Nerves: No cranial nerve deficit. Sensory: Sensory deficit present. Motor: No tremor, atrophy or abnormal muscle tone. Coordination: Coordination normal.      Gait: Gait abnormal.      Deep Tendon Reflexes: Reflexes abnormal. Babinski sign absent on the right side. Babinski sign absent on the left side. Psychiatric:         Attention and Perception: She is attentive. Mood and Affect: Mood is depressed. Mood is not anxious. Affect is not labile, blunt, angry or inappropriate. Speech: Speech is tangential.         Behavior: Behavior normal. Behavior is not agitated, slowed, aggressive, withdrawn, hyperactive or combative. Thought Content: Thought content normal. Thought content is not paranoid or delusional. Thought content does not include homicidal or suicidal ideation. Thought content does not include homicidal or suicidal plan. Cognition and Memory: Memory is not impaired. She does not exhibit impaired recent memory or impaired remote memory. Judgment: Judgment normal. Judgment is not impulsive or inappropriate. Ortho Exam  Neurologic Exam     Mental Status   Oriented to person, place, and time. Cranial Nerves     CN III, IV, VI   Pupils are equal, round, and reactive to light.             Diagnostics:    Recent Results (from the past 24 hour(s))   CBC    Collection Time: 10/13/20  2:08 PM   Result Value Ref Range    WBC 11.7 (H) 4.8 - 10.8 K/uL    RBC 2.85 (L) 4.20 - 5.40 M/uL    Hemoglobin 7.3 (L) 12.0 - 16.0 g/dL    Hematocrit 22.6 (L) 37.0 - 47.0 %    MCV 79.4 (L) 82.0 - 100.0 fL    MCH 25.7 (L) 27.0 - 31.3 pg    MCHC 32.4 (L) 33.0 - 37.0 %    RDW 21.6 (H) 11.5 - 14.5 %    Platelets 921 597 - 650 K/uL   Basic Metabolic Panel w/ Reflex to MG    Collection Time: 10/13/20  2:08 PM   Result Value Ref Range    Sodium 131 (L) 135 - 144 mEq/L    Potassium reflex Magnesium 3.5 3.4 - 4.9 mEq/L    Chloride 94 (L) 95 - 107 mEq/L    CO2 27 20 - 31 mEq/L    Anion Gap 10 9 - 15 mEq/L    Glucose 243 (H) 70 - 99 mg/dL    BUN 47 (H) 8 - 23 mg/dL    CREATININE 3.80 (H) 0.50 - 0.90 mg/dL    GFR Non-African American 11.5 (L) >60    GFR  14.0 (L) >60    Calcium 7.3 (L) 8.5 - 9.9 mg/dL   Comprehensive Metabolic Panel    Collection Time: 10/13/20  2:08 PM   Result Value Ref Range    Potassium 3.5 3.4 - 4.9 mEq/L    Total Protein 4.7 (L) 6.3 - 8.0 g/dL    Alb 2.6 (L) 3.5 - 4.6 g/dL    Total Bilirubin 0.4 0.2 - 0.7 mg/dL    Alkaline Phosphatase 215 (H) 40 - 130 U/L     (H) 0 - 33 U/L     (H) 0 - 35 U/L    Globulin 2.1 (L) 2.3 - 3.5 g/dL   Hemoglobin and Hematocrit, Blood    Collection Time: 10/13/20  2:08 PM   Result Value Ref Range    Hemoglobin 7.4 (L) 12.0 - 16.0 g/dL    Hematocrit 22.6 (L) 37.0 - 47.0 %   Hemoglobin A1c    Collection Time: 10/13/20  2:08 PM   Result Value Ref Range    Hemoglobin A1C 9.2 (H) 4.8 - 5.9 %   Magnesium    Collection Time: 10/13/20  2:08 PM   Result Value Ref Range    Magnesium 2.1 1.7 - 2.4 mg/dL   POCT Glucose    Collection Time: 10/13/20  4:48 PM   Result Value Ref Range    POC Glucose 257 (H) 60 - 115 mg/dl    Performed on ACCU-CHEK    Hemoglobin and Hematocrit, Blood    Collection Time: 10/13/20  8:33 PM   Result Value Ref Range    Hemoglobin 7.7 (L) 12.0 - 16.0 g/dL    Hematocrit 24.3 (L) 37.0 - 47.0 % Gastroesophageal reflux disease without esophagitis    Acute cystitis without hematuria    CKD (chronic kidney disease) stage 4, GFR 15-29 ml/min ([facility])    Gait abnormality  Resolved Problems:    * No resolved hospital problems. *            Recommendations:    1. Considering all of the factors aboveincluding the patient's current medical status, social status/home envirnment, their functional needs and their ability to participate in a therapy program, I feel that they would best be served at a  acute   Rehabilitationlevel of care. It is my opinion that they will be able to tolerate and benefit from 3 hours of therapy a day. I reviewed the varous local options re these levels of care with the patient and family. 2. Nursing care to focus on bowel and bladder issues. 3. Vitamin B12 shot times one  4. Improve hydration and Nutrition by adding Proteinex and push PO fluids    Greater 50% of 50 minutes spent evaluating patient face to face. It was my pleasure aimee Weller See today. Please call [phone] with questions.     Tyra Roberson, DO

## 2020-10-14 NOTE — CARE COORDINATION
Precert started with [facility], PRRZCIBIO#[id number].  Electronically signed by Lorin Wolfe RN on 10/14/20 at 5:12 PM EDT

## 2020-10-14 NOTE — PROGRESS NOTES
10/14/20  1235   WBC 11.7*  --  13.5*  --    HGB 7.4*  7.3*   < > 7.2* 6.8*   HCT 22.6*  22.6*   < > 21.9* 20.9*   MCV 79.4*  --  78.1*  --      --  156  --     < > = values in this interval not displayed. Recent Labs     10/13/20  1408 10/14/20  0600   * 135   K 3.5  3.5 2.9*   CL 94* 96   CO2 27 26   BUN 47* 45*   CREATININE 3.80* 3.75*   GLUCOSE 243* 221*   CALCIUM 7.3* 8.1*   PROT 4.7*  --    LABALBU 2.6*  --    BILITOT 0.4  --    ALKPHOS 215*  --    *  --    *  --    LABGLOM 11.5* 11.7*   GFRAA 14.0* 14.2*   GLOB 2.1*  --        MV Settings:     Vent Mode: (S) CPAP  Vt Ordered: 280 mL  Rate Set: 12 bmp  FiO2 : 100 %  PEEP/CPAP: 5  Pressure Support: 5 cmH20  Peak Inspiratory Pressure: 12 cmH2O  Mean Airway Pressure: 7.7 cmH20  I:E Ratio: 1:5.20    No results for input(s): PHART, KEV4VQL, PO2ART, EDD6UNM, BEART, Z6IJNTRW in the last 72 hours. O2 Device: Nasal cannula  O2 Flow Rate (L/min): 3 L/min    Dietary Nutrition Supplements: Diabetic Oral Supplement  DIET CARB CONTROL; Carb Control: 3 carb choices (45 gms)/meal; Low Sodium (2 GM);  Daily Fluid Restriction: 2000 ml     MEDICATIONS during current hospitalization:    Continuous Infusions:   dextrose      dextrose         Scheduled Meds:   potassium chloride  20 mEq Oral TID    furosemide  40 mg Intravenous BID    cefUROXime  250 mg Oral 2 times per day    insulin glargine  50 Units Subcutaneous Nightly    insulin lispro  18 Units Subcutaneous TID WC    cloNIDine  0.2 mg Oral TID    isosorbide mononitrate  60 mg Oral Daily    insulin lispro  0-12 Units Subcutaneous TID WC    insulin lispro  0-6 Units Subcutaneous Nightly    carvedilol  25 mg Oral BID WC    spironolactone  25 mg Oral Daily    sodium chloride  20 mL Intravenous Once    verapamil  120 mg Oral Nightly    sodium chloride flush  10 mL Intravenous 2 times per day    pantoprazole  40 mg Intravenous Daily    And    sodium chloride (PF)  10 mL Intravenous Daily    [Held by provider] PARoxetine  40 mg Oral QAM    rosuvastatin  40 mg Oral QPM       PRN Meds:glucose, dextrose, glucagon (rDNA), dextrose, sodium chloride flush, lidocaine viscous hcl, magic (miracle) mouthwash, labetalol, potassium chloride, sodium chloride flush, promethazine **OR** ondansetron, ondansetron, acetaminophen **OR** acetaminophen, glucose, dextrose, glucagon (rDNA), dextrose, iopamidol    Radiology  Ct Abdomen Pelvis Wo Contrast Additional Contrast? None    Result Date: 10/6/2020  CT ABDOMEN PELVIS WO CONTRAST: 10/5/2020 CLINICAL HISTORY:  recent colonoscopy, fever, vomiting, abd pain . COMPARISON: 8/24/2020. TECHNIQUE: Spiral images were obtained of the abdomen and pelvis without contrast. All CT scans at this facility use dose modulation, iterative reconstruction, and/or weight based dosing when appropriate to reduce radiation dose to as low as reasonably achievable. FINDINGS: A moderate amount of stool is present within the colon and rectum. There is no abnormal small bowel dilatation, significant inflammation, ascites, lymphadenopathy is, or other significant change from the prior study identified. The unenhanced liver, gallbladder, pancreas, spleen, adrenal glands, kidneys, uterus, adnexa, urinary bladder, and additional images of pelvis appear within normal limits. Moderate atherosclerotic plaquing of a normal caliber abdominal aorta and branch vessels is again noted. Minimal probable atelectasis is noted of the visualized lung bases. Moderately extensive degenerative changes of the thoracolumbar spine with grade 1 anterolisthesis of L4 over L5 is again noted. NO ACUTE INTRA-ABDOMINAL PROCESS OR SIGNIFICANT CHANGE FROM 8/24/2020 IDENTIFIED. Ct Head Wo Contrast    Result Date: 10/9/2020  EXAMINATION: CT of the brain without contrast HISTORY: Patient is unresponsive. Generalized weakness. Fever.  COMPARISON: CT brain from October 7, 2020 TECHNIQUE: Multiple contiguous axial images were obtained of the brain from the skull base through the vertex. Multiplanar reformats were obtained. FINDINGS: Prominence of the sulci and ventricles compatible with mild generalized parenchymal volume loss. Areas of bilateral supratentorial white matter hypoattenuation are nonspecific but most likely related to chronic small vessel ischemic changes in a patient of this age. Remote left thalamic lacunar infarct. Remote posterior left frontal/parietal lacunar infarct Gray-white matter differentiation is preserved. No acute hemorrhage or abnormal extra-axial fluid collection. No mass effect or midline shift. The visualized paranasal sinuses and mastoid air cells are clear. Calvarium is intact. No acute intracranial process or significant interval change. All CT scans at this facility use dose modulation, iterative reconstruction, and/or weight based dosing when appropriate to reduce radiation dose to as low as reasonably achievable. Ct Head Wo Contrast    Result Date: 10/7/2020  CT Brain Contrast medium:  Not utilized. History:  Cardiac arrest Comparison:  CT brain, August 25, 2020, August 24, 2020. MRI brain, August 27, 2020. Findings: Extra-axial spaces:  Normal. Intracranial hemorrhage:  None. Ventricular system: Ventricles mildly enlarged. Sulci mildly prominent. Basal Cisterns:  Normal. Cerebral Parenchyma: 4 mm area decreased attenuation exerting no mass effect left thalamus. Midline Shift:  None. Cerebellum:  Normal. Paranasal sinuses and mastoid air cells: Opacification bilateral ethmoid sinuses. Visualized Orbits:  Normal.     Impression: Mild cerebral atrophy. Remote left thalamic lacunar infarct. Ethmoid sinusitis. All CT scans at this facility use dose modulation, iterative reconstruction, and/or weight based dosing when appropriate to reduce radiation dose to as low as reasonably achievable. Xr Chest Portable    Result Date: 10/10/2020  EXAMINATION: XR CHEST PORTABLE.  DATE AND TIME:10/9/2020 5:00 AM CLINICAL HISTORY: SHORTNESS OF BREATH  COMPARISONS: OCTOBER 8, 2020  FINDINGS: ET tube and nasogastric tube remain in satisfactory position. Persistent to increased basilar markings bilaterally with no significant change. NO CHANGE     Xr Chest Portable    Result Date: 10/8/2020  XR CHEST PORTABLE : 10/8/2020 CLINICAL HISTORY:  resp failure . COMPARISON: 10/7/2020. TECHNIQUE: A portable upright AP radiograph of the chest was obtained. FINDINGS: An endotracheal tube has been mildly withdrawn with its tip approximately 3 cm above the mojgan. There is been interval placement of an orogastric tube with its tip overlying the body of the stomach. Shallow inspiratory volumes are present with mild to moderate airspace infiltrates and bandlike opacities, predominantly of the mid to lower lung fields. A very small right pleural effusion is suggested. There is no significant left pleural effusion, pneumothorax, or displaced fractures identified. The heart remains mildly enlarged with mild vascular congestion, versus technique     ENDOTRACHEAL AND OROGASTRIC TUBES IN EXPECTED POSITION. POSSIBLE MILD CARDIAC DECOMPENSATION, WITH MILD TO MODERATE ATELECTASIS/EDEMA OF THE MID TO LOWER LUNG HAMILTON. BRONCHOPNEUMONIA SHOULD BE EXCLUDED CLINICALLY. SUSPECT VERY SMALL RIGHT PLEURAL EFFUSION. Xr Chest Portable    Result Date: 10/7/2020  EXAMINATION: CHEST PORTABLE VIEW  CLINICAL HISTORY: Intubation COMPARISONS: October 5, 2020  FINDINGS: Single  views of the chest is submitted. There is an endotracheal tube. The tip lies at the orifice of the right mainstem bronchus. Repositioning is recommended. The cardiac silhouette is enlarged. Pulmonary vascular unremarkable. Right sided trachea. No focal infiltrates. No Pneumothoraces. THE TIP OF THE ENDOTRACHEAL TUBE LIES AT THE ORIFICE THE RIGHT MAINSTEM BRONCHUS.  REPOSITIONING RECOMMENDED    Xr Chest Portable    Result Date: 10/6/2020  EXAMINATION: XR CHEST PORTABLE CLINICAL HISTORY: SHORTNESS OF BREATH COMPARISONS: CT CHEST, AUGUST 25, 2020. CHEST RADIOGRAPHS, AUGUST 25, 2020, AUGUST 24, 2020, JUNE 29, 2020. FINDINGS: Osseous structures are intact. Cardiopericardial silhouette is enlarged and unchanged. Pulmonary vasculature is normal. Lungs are clear. STABLE CARDIOMEGALY. Ir Picc Wo Sq Port/pump > 5 Years    Result Date: 10/10/2020  PERIPHERALLY INSERTED CENTRAL CATHETER (PICC) PLACEMENT WITH ULTRASOUND GUIDANCE CLINICAL HISTORY:  REQUIRES INTRAVENOUS ACCESS After discussing the procedure and possible complications with the patient, informed consent was obtained. The patient was placed on the Special Procedures table. The right upper extremity was sterilely prepared using a maximal sterile barrier technique which includes cap, mask, sterile gown, sterile gloves, sterile full-body drape, hand hygiene and 2% chlorhexidine for cutaneous antisepsis. A sterile ultrasound technique with sterile gel and sterile probe covers was also utilized. A pre-procedure time out was performed in order to assure the correct patient and procedure. Local anesthetic was administered. A peripheral vein was accessed with sonographic guidance. A sonographic spot image was obtained for documentation. A guidewire was advanced into the vein with fluoroscopic guidance and a sheath was placed over the guidewire. A 5-Yoruba dual-lumen PICC was advanced through the sheath, up the arm and into the central vasculature. It was positioned appropriately. The sheath was removed. The catheter was shown to aspirate and infuse properly. The flange of the catheter was affixed to the arm using a PICC securement device. A spot image of the chest showed the tip of the PICC line to lie in the superior vena cava. The patient tolerated the procedure well and without complications.   Number of films: 4 Fluoroscopy

## 2020-10-14 NOTE — PLAN OF CARE
Nutrition Problem #1: Inadequate oral intake  Intervention: Food and/or Nutrient Delivery: Continue Current Diet, Continue Oral Nutrition Supplement(Continue Carb Control 3;Low Sodium Diet. Continue Diabetic ONS TID (chocolate))  Nutritional Goals: New goal with po diet initiated. PO intake >50% of meals/supplement.

## 2020-10-14 NOTE — PROGRESS NOTES
Comprehensive Nutrition Assessment    Type and Reason for Visit:  Reassess    Nutrition Recommendations/Plan:   Continue Carb Control 3;Low Sodium Diet. Continue Diabetic ONS TID (chocolate)    Nutrition Assessment:  Pt remains at nutrition risk. Oral intake noted to be variable. Will continue to provide diabetic ONS and monitor adequacy of po intake. Malnutrition Assessment:  Malnutrition Status: At risk for malnutrition (Comment)    Context:  Acute Illness     Findings of the 6 clinical characteristics of malnutrition:  Energy Intake:  Unable to assess  Weight Loss:  No significant weight loss     Body Fat Loss:  No significant body fat loss     Muscle Mass Loss:  No significant muscle mass loss    Fluid Accumulation:  Unable to assess     Strength:  Not Performed    Estimated Daily Nutrient Needs:  Energy (kcal):  6410-8836 (kg x 20-22); Weight Used for Energy Requirements:  Usual     Protein (g):  53-66 gm (kg IBW x 1.2-1.5); Weight Used for Protein Requirements:  Ideal        Fluid (ml/day):  3888-2393 (1 ml/kcal); Weight Used for Fluid Requirements:  Current      Nutrition Related Findings:  PMH- DB, COPD, CHF. Pt previously on vent 10/-10/9pm. +1 Gen edema noted. labs reviewed, Gluc: 264, elevated BUN/CR, K+ 2.9, BM (10/13), I/O: 20/4500 (+4 L), dark tarry stools noted, GI consultedPt's dtr stated decreased intake pta x several months. Pt stated appetite decreased currently/agreeable to supplement. Wounds:  None       Current Nutrition Therapies:    Dietary Nutrition Supplements: Diabetic Oral Supplement  DIET CARB CONTROL; Carb Control: 3 carb choices (45 gms)/meal; Low Sodium (2 GM);  Daily Fluid Restriction: 2000 ml    Anthropometric Measures:  · Height: 4' 11\" (149.9 cm)  · Current Body Weight: 160 lb (72.6 kg)(10/10-bedscale)   · Admission Body Weight: 134 lb (60.8 kg)(stated)    · Usual Body Weight: 133 lb (60.3 kg)(2/24/20-bedscale)     · Ideal Body Weight: 95 lbs; % Ideal Body Weight   > 100%  · BMI: 32.3  · BMI Categories: Obese Class 1 (BMI 30.0-34. 9)       Nutrition Diagnosis:   · Inadequate oral intake related to (current condition, s/p extubation) as evidenced by intake 0-25%(prev NPO)    Nutrition Interventions:   Food and/or Nutrient Delivery:  Continue Current Diet, Continue Oral Nutrition Supplement(Continue Carb Control 3;Low Sodium Diet. Continue Diabetic ONS TID (chocolate))  Nutrition Education/Counseling:  No recommendation at this time   Coordination of Nutrition Care:  Continued Inpatient Monitoring    Goals:  New goal with po diet initiated. PO intake >50% of meals/supplement. Nutrition Monitoring and Evaluation:   Food/Nutrient Intake Outcomes:  Food and Nutrient Intake, Supplement Intake  Physical Signs/Symptoms Outcomes:  Weight, Biochemical Data, Fluid Status or Edema     Discharge Planning:     Too soon to determine     Electronically signed by Cristina Carrasco RD, LD on 10/14/20 at 1:44 PM EDT

## 2020-10-14 NOTE — CARE COORDINATION
Social work note: Confirmed with  as patient was sleeping that they are agreeable to [facility]. Updated him that a referral was made to acute rehab. While he would like patient home and is able to provide care, medically he feels better with acute rehab. Electronically signed by MILAN Schmid on 10/14/2020 at 4:47 PM     1650: Cite Sky Mckeon RN admissions from [facility] starting precert.  Electronically signed by MILAN Schmid on 10/14/2020 at 4:57 PM

## 2020-10-14 NOTE — CONSULTS
[address]                      [address], [address] [facility]                                  CONSULTATION    PATIENT NAME: Roxann Lopez                   :        1945  MED REC NO:   [id number]                            ROOM:       C674  ACCOUNT NO:   [de-identified]                           ADMIT DATE: 10/05/2020  PROVIDER:     Yobani Tucker MD    CONSULT DATE:  10/13/2020    ENDOCRINE CONSULT    REFERRING PROVIDER:  Siena Adler DO    REASON FOR CONSULT:  Management of uncontrolled diabetes. CHIEF COMPLAINT AND HISTORY OF PRESENT ILLNESS:  The patient is a  76-year-old female with known history of inadequately controlled  diabetes, admitted with sepsis and UTI. Initial complaints were fever,  chills for last 12 hours. Admitting diagnosis was UTI, sepsis and  hyperglycemia. The patient was seen by nephrologist and cardiologist  for cardiomyopathy and chronic kidney disease. Blood sugars here have  been staying between 300-400 range. Most current chemistries were reviewed from earlier this morning. Sodium 135, potassium 2.3, chloride 97, CO2 was 24, BUN 48, creatinine  3.8. Her A1c was 9.2. Prior A1c have been mostly between 9-11.8 range. The patient is currently on Lantus, Humalog and coverage insulin. She  was also on Solu-Medrol 100 mg IV daily, Lantus dose was increased to 50  units nightly, Humalog increased from 10-18. The patient is also want  to be put on medium dose sliding scale coverage. P.o. intake has been  variable. Complications of diabetes include heart disease and chronic  kidney disease. PAST MEDICAL HISTORY:  Type 2 diabetes, slight sleep apnea,  hypertension, hypercholesterolemia, fibromyalgia, depression, chronic  back pain, congestive heart failure, asthma, anxiety. PAST SURGICAL HISTORY:  Endoscopies, tonsillectomy, EGD, eye surgery,  colonoscopy, cardiac catheterization, back surgery.     FAMILY HISTORY:

## 2020-10-15 LAB
ANION GAP SERPL CALCULATED.3IONS-SCNC: 10 MEQ/L (ref 9–15)
BUN BLDV-MCNC: 39 MG/DL (ref 8–23)
CALCIUM SERPL-MCNC: 8.2 MG/DL (ref 8.5–9.9)
CHLORIDE BLD-SCNC: 99 MEQ/L (ref 95–107)
CO2: 29 MEQ/L (ref 20–31)
CREAT SERPL-MCNC: 3.56 MG/DL (ref 0.5–0.9)
GFR AFRICAN AMERICAN: 15.1
GFR NON-AFRICAN AMERICAN: 12.4
GLUCOSE BLD-MCNC: 146 MG/DL (ref 60–115)
GLUCOSE BLD-MCNC: 195 MG/DL (ref 60–115)
GLUCOSE BLD-MCNC: 313 MG/DL (ref 60–115)
GLUCOSE BLD-MCNC: 44 MG/DL (ref 70–99)
GLUCOSE BLD-MCNC: 49 MG/DL (ref 60–115)
GLUCOSE BLD-MCNC: 91 MG/DL (ref 60–115)
HCT VFR BLD CALC: 26.3 % (ref 37–47)
HCT VFR BLD CALC: 27.2 % (ref 37–47)
HEMOGLOBIN: 8.5 G/DL (ref 12–16)
HEMOGLOBIN: 9 G/DL (ref 12–16)
MAGNESIUM: 1.8 MG/DL (ref 1.7–2.4)
MCH RBC QN AUTO: 26.7 PG (ref 27–31.3)
MCHC RBC AUTO-ENTMCNC: 33 % (ref 33–37)
MCV RBC AUTO: 80.9 FL (ref 82–100)
PDW BLD-RTO: 22 % (ref 11.5–14.5)
PERFORMED ON: ABNORMAL
PERFORMED ON: NORMAL
PLATELET # BLD: 164 K/UL (ref 130–400)
POTASSIUM REFLEX MAGNESIUM: 3.5 MEQ/L (ref 3.4–4.9)
RBC # BLD: 3.36 M/UL (ref 4.2–5.4)
SODIUM BLD-SCNC: 138 MEQ/L (ref 135–144)
WBC # BLD: 16.1 K/UL (ref 4.8–10.8)

## 2020-10-15 PROCEDURE — 99232 SBSQ HOSP IP/OBS MODERATE 35: CPT | Performed by: INTERNAL MEDICINE

## 2020-10-15 PROCEDURE — 80048 BASIC METABOLIC PNL TOTAL CA: CPT

## 2020-10-15 PROCEDURE — C9113 INJ PANTOPRAZOLE SODIUM, VIA: HCPCS | Performed by: INTERNAL MEDICINE

## 2020-10-15 PROCEDURE — 6360000002 HC RX W HCPCS: Performed by: INTERNAL MEDICINE

## 2020-10-15 PROCEDURE — 6370000000 HC RX 637 (ALT 250 FOR IP): Performed by: INTERNAL MEDICINE

## 2020-10-15 PROCEDURE — 85018 HEMOGLOBIN: CPT

## 2020-10-15 PROCEDURE — 2580000003 HC RX 258: Performed by: INTERNAL MEDICINE

## 2020-10-15 PROCEDURE — 2060000000 HC ICU INTERMEDIATE R&B

## 2020-10-15 PROCEDURE — 83735 ASSAY OF MAGNESIUM: CPT

## 2020-10-15 PROCEDURE — 99231 SBSQ HOSP IP/OBS SF/LOW 25: CPT | Performed by: PHYSICAL MEDICINE & REHABILITATION

## 2020-10-15 PROCEDURE — 97116 GAIT TRAINING THERAPY: CPT

## 2020-10-15 PROCEDURE — 97535 SELF CARE MNGMENT TRAINING: CPT

## 2020-10-15 PROCEDURE — 85014 HEMATOCRIT: CPT

## 2020-10-15 PROCEDURE — 36592 COLLECT BLOOD FROM PICC: CPT

## 2020-10-15 PROCEDURE — 85027 COMPLETE CBC AUTOMATED: CPT

## 2020-10-15 RX ORDER — FUROSEMIDE 10 MG/ML
40 INJECTION INTRAMUSCULAR; INTRAVENOUS DAILY
Status: CANCELLED | OUTPATIENT
Start: 2020-10-16

## 2020-10-15 RX ORDER — ACETAMINOPHEN 325 MG/1
650 TABLET ORAL EVERY 6 HOURS PRN
Status: CANCELLED | OUTPATIENT
Start: 2020-10-15

## 2020-10-15 RX ORDER — INSULIN GLARGINE 100 [IU]/ML
35 INJECTION, SOLUTION SUBCUTANEOUS NIGHTLY
Status: DISCONTINUED | OUTPATIENT
Start: 2020-10-15 | End: 2020-10-16

## 2020-10-15 RX ORDER — ACETAMINOPHEN 650 MG/1
650 SUPPOSITORY RECTAL EVERY 6 HOURS PRN
Status: CANCELLED | OUTPATIENT
Start: 2020-10-15

## 2020-10-15 RX ORDER — ROSUVASTATIN CALCIUM 20 MG/1
40 TABLET, COATED ORAL EVERY EVENING
Status: CANCELLED | OUTPATIENT
Start: 2020-10-15

## 2020-10-15 RX ORDER — INSULIN GLARGINE 100 [IU]/ML
35 INJECTION, SOLUTION SUBCUTANEOUS NIGHTLY
Status: CANCELLED | OUTPATIENT
Start: 2020-10-15

## 2020-10-15 RX ORDER — LIDOCAINE HYDROCHLORIDE 20 MG/ML
15 SOLUTION OROPHARYNGEAL
Status: CANCELLED | OUTPATIENT
Start: 2020-10-15

## 2020-10-15 RX ORDER — PAROXETINE HYDROCHLORIDE 20 MG/1
40 TABLET, FILM COATED ORAL EVERY MORNING
Status: CANCELLED | OUTPATIENT
Start: 2020-10-16

## 2020-10-15 RX ORDER — HYDROXYZINE HYDROCHLORIDE 25 MG/1
25 TABLET, FILM COATED ORAL 3 TIMES DAILY PRN
Status: CANCELLED | OUTPATIENT
Start: 2020-10-15

## 2020-10-15 RX ORDER — SPIRONOLACTONE 25 MG/1
25 TABLET ORAL DAILY
Status: CANCELLED | OUTPATIENT
Start: 2020-10-16

## 2020-10-15 RX ORDER — DEXTROSE MONOHYDRATE 50 MG/ML
100 INJECTION, SOLUTION INTRAVENOUS PRN
Status: CANCELLED | OUTPATIENT
Start: 2020-10-15

## 2020-10-15 RX ORDER — NICOTINE POLACRILEX 4 MG
15 LOZENGE BUCCAL PRN
Status: CANCELLED | OUTPATIENT
Start: 2020-10-15

## 2020-10-15 RX ORDER — CEFUROXIME AXETIL 500 MG/1
250 TABLET ORAL EVERY 12 HOURS SCHEDULED
Status: CANCELLED | OUTPATIENT
Start: 2020-10-15 | End: 2020-10-16

## 2020-10-15 RX ORDER — CARVEDILOL 25 MG/1
25 TABLET ORAL 2 TIMES DAILY WITH MEALS
Status: CANCELLED | OUTPATIENT
Start: 2020-10-15

## 2020-10-15 RX ORDER — PROMETHAZINE HYDROCHLORIDE 12.5 MG/1
12.5 TABLET ORAL EVERY 6 HOURS PRN
Status: CANCELLED | OUTPATIENT
Start: 2020-10-15

## 2020-10-15 RX ORDER — CLONIDINE HYDROCHLORIDE 0.1 MG/1
0.2 TABLET ORAL 3 TIMES DAILY
Status: CANCELLED | OUTPATIENT
Start: 2020-10-15

## 2020-10-15 RX ORDER — ISOSORBIDE MONONITRATE 60 MG/1
60 TABLET, EXTENDED RELEASE ORAL DAILY
Status: CANCELLED | OUTPATIENT
Start: 2020-10-16

## 2020-10-15 RX ORDER — ONDANSETRON 2 MG/ML
4 INJECTION INTRAMUSCULAR; INTRAVENOUS EVERY 6 HOURS PRN
Status: CANCELLED | OUTPATIENT
Start: 2020-10-15

## 2020-10-15 RX ORDER — DEXTROSE MONOHYDRATE 25 G/50ML
12.5 INJECTION, SOLUTION INTRAVENOUS PRN
Status: CANCELLED | OUTPATIENT
Start: 2020-10-15

## 2020-10-15 RX ORDER — FUROSEMIDE 10 MG/ML
40 INJECTION INTRAMUSCULAR; INTRAVENOUS DAILY
Status: DISCONTINUED | OUTPATIENT
Start: 2020-10-15 | End: 2020-10-17

## 2020-10-15 RX ORDER — VERAPAMIL HYDROCHLORIDE 240 MG/1
120 TABLET, FILM COATED, EXTENDED RELEASE ORAL NIGHTLY
Status: CANCELLED | OUTPATIENT
Start: 2020-10-15

## 2020-10-15 RX ADMIN — ROSUVASTATIN CALCIUM 40 MG: 40 TABLET, FILM COATED ORAL at 17:44

## 2020-10-15 RX ADMIN — CEFUROXIME AXETIL 250 MG: 500 TABLET ORAL at 10:19

## 2020-10-15 RX ADMIN — SPIRONOLACTONE 25 MG: 25 TABLET, FILM COATED ORAL at 10:20

## 2020-10-15 RX ADMIN — INSULIN GLARGINE 35 UNITS: 100 INJECTION, SOLUTION SUBCUTANEOUS at 21:32

## 2020-10-15 RX ADMIN — CARVEDILOL 25 MG: 25 TABLET, FILM COATED ORAL at 17:44

## 2020-10-15 RX ADMIN — CLONIDINE HYDROCHLORIDE 0.2 MG: 0.1 TABLET ORAL at 10:19

## 2020-10-15 RX ADMIN — FUROSEMIDE 40 MG: 10 INJECTION, SOLUTION INTRAMUSCULAR; INTRAVENOUS at 10:18

## 2020-10-15 RX ADMIN — INSULIN LISPRO 8 UNITS: 100 INJECTION, SOLUTION INTRAVENOUS; SUBCUTANEOUS at 17:43

## 2020-10-15 RX ADMIN — VERAPAMIL HYDROCHLORIDE 120 MG: 240 TABLET, FILM COATED, EXTENDED RELEASE ORAL at 21:19

## 2020-10-15 RX ADMIN — Medication 10 ML: at 10:20

## 2020-10-15 RX ADMIN — POTASSIUM CHLORIDE 20 MEQ: 750 TABLET, EXTENDED RELEASE ORAL at 15:03

## 2020-10-15 RX ADMIN — Medication 10 ML: at 10:18

## 2020-10-15 RX ADMIN — CARVEDILOL 25 MG: 25 TABLET, FILM COATED ORAL at 10:20

## 2020-10-15 RX ADMIN — POTASSIUM CHLORIDE 20 MEQ: 750 TABLET, EXTENDED RELEASE ORAL at 10:19

## 2020-10-15 RX ADMIN — CEFUROXIME AXETIL 250 MG: 500 TABLET ORAL at 21:19

## 2020-10-15 RX ADMIN — CLONIDINE HYDROCHLORIDE 0.2 MG: 0.1 TABLET ORAL at 15:02

## 2020-10-15 RX ADMIN — Medication 5 ML: at 13:07

## 2020-10-15 RX ADMIN — POTASSIUM CHLORIDE 20 MEQ: 750 TABLET, EXTENDED RELEASE ORAL at 21:20

## 2020-10-15 RX ADMIN — PANTOPRAZOLE SODIUM 40 MG: 40 INJECTION, POWDER, FOR SOLUTION INTRAVENOUS at 10:18

## 2020-10-15 RX ADMIN — INSULIN LISPRO 2 UNITS: 100 INJECTION, SOLUTION INTRAVENOUS; SUBCUTANEOUS at 13:03

## 2020-10-15 RX ADMIN — Medication 5 ML: at 17:45

## 2020-10-15 RX ADMIN — Medication 10 ML: at 22:39

## 2020-10-15 RX ADMIN — CLONIDINE HYDROCHLORIDE 0.2 MG: 0.1 TABLET ORAL at 21:20

## 2020-10-15 RX ADMIN — ISOSORBIDE MONONITRATE 60 MG: 60 TABLET, EXTENDED RELEASE ORAL at 10:19

## 2020-10-15 ASSESSMENT — ENCOUNTER SYMPTOMS
NAUSEA: 0
COLOR CHANGE: 0
VOMITING: 0
SHORTNESS OF BREATH: 0
CHEST TIGHTNESS: 0

## 2020-10-15 ASSESSMENT — PAIN SCALES - GENERAL
PAINLEVEL_OUTOF10: 0

## 2020-10-15 NOTE — FLOWSHEET NOTE
Unable to get blood return from any ports in PICC line. Lab called to draw H&H.  Electronically signed by Olegario Whitlock RN on 10/15/2020 at 1:36 PM

## 2020-10-15 NOTE — DISCHARGE SUMMARY
Hospital Medicine Discharge Summary    Sherin Davila  :  1945  MRN:  [id number]    Admit date:  10/5/2020  Discharge date:  10/15/20    Admitting Physician:  NIMA STEVE.HMaribellSMaribell,   Primary Care Physician:  Marguerite Raymundo MD      Discharge Diagnoses:    Principal Problem:    Sepsis [facility])  Active Problems:    Lumbosacral radiculopathy at L5    Cardiopulmonary arrest ([facility] [address]    Hypertension    Hyperlipidemia    Reactive depression    CHF (congestive heart failure) ([facility]    Uncontrolled type 2 diabetes mellitus with hyperglycemia ([facility])    COPD exacerbation ([facility])    Major depressive disorder in remission ([facility]    Gastroesophageal reflux disease without esophagitis    Acute cystitis without hematuria    CKD (chronic kidney disease) stage 4, GFR 15-29 ml/min ([facility])    Gait abnormality    Late effects of CVA (cerebrovascular accident)  Resolved Problems:    * No resolved hospital problems. *      Hospital Course:   Sherin Davila is a 67 y.o. female that was admitted and treated at [facility] for sepsis secondary to urinary tract infection. Patient presented with a week of progressing urinary symptoms including elevated glucose increasing urinary frequency dysuria abdominal pain. Patient has a history of ESBL UTI and was started on antibiotic coverage with Cipro. She also had a noted profound decrease in her renal function with a creatinine of 3.2 on arrival baseline around 1.7. Patient subsequently developed a cardiac arrest on 10/7. ROSC was obtained after approximately 20 minutes,. After resuscitation patient was taken to cardiac Cath Lab where she had no significant obstructions requiring stenting. She is started on pressor support with Levophed and dopamine covered with broad-spectrum coverage and treated with hypothermic protocol. On rewarming she awoke with out any clear focal deficits although she does continue to have a profoundly decreased renal function.   She was transferred to the general medical floor and continues improvement. She remains significantly weak and will require acute rehab on discharge resume her previous function. She continues to have ataxia and weakness. She remains on Ceftin for 3 further doses to complete treatment for her UTI. Hospitalization was also complicated by recurrent symptomatic anemia. This is been worked up outpatient with endoscopy colonoscopy and push enteroscopy without success. Patient will have an outpatient capsule endoscopy once stable and discharged home. At this point time after discussing with GI the plan will be to continue following the hemoglobin and transfusing as necessary. At this point time will continue to follow her electrolytes supplementing as necessary but she is stable for discharge to acute rehab. Patient was seen by the following consultants while admitted to [facility]:   Consults:  [address] [place] [address] TO DIETITIAN  IP CONSULT TO GI  IP CONSULT TO ENDOCRINOLOGY  IP CONSULT TO REHAB/TCU ADMISSION COORDINATOR    Physical Exam:   Constitutional:       Appearance: Normal appearance. She is not ill-appearing or diaphoretic. HENT:      Head: Normocephalic and atraumatic. Nose: Nose normal.      Mouth/Throat:      Mouth: Mucous membranes are moist.      Pharynx: Oropharynx is clear. Eyes:      Extraocular Movements: Extraocular movements intact. Pupils: Pupils are equal, round, and reactive to light. Cardiovascular:      Rate and Rhythm: Normal rate. Heart sounds: Murmur present. No friction rub. No gallop. Pulmonary:      Breath sounds: Wheezing and rales present. No rhonchi. Abdominal:      General: There is no distension. Tenderness: There is no abdominal tenderness. There is no guarding. Musculoskeletal: Normal range of motion. General: Swelling present. Neurological:      General: No focal deficit present.       Mental Status: She is alert and oriented to person, place, and time. Psychiatric:         Mood and Affect: Mood normal.         Thought Content: Thought content normal.         Judgment: Judgment normal.     Significant Diagnostic Studies:  NM renal scan  Impression    FINDINGS CONSISTENT WITH BILATERAL NONSPECIFIC DECREASED CORTICAL RENAL FUNCTION. NO HYDRONEPHROSIS. CT head  Impression         No acute intracranial process or significant interval change.         All CT scans at this facility use dose modulation, iterative reconstruction, and/or weight based dosing when appropriate to reduce radiation dose to as low as reasonably achievable. CT AP     Impression         NO ACUTE INTRA-ABDOMINAL PROCESS OR SIGNIFICANT CHANGE FROM 8/24/2020 IDENTIFIED.             Discharge Medications:       Kenrick Lanza   [place] [facility] Medication Instructions HLT:[id number]    Printed on:10/15/20 4166   Medication Information                      ACCU-CHEK PHYLLIS PLUS strip  Test 3x daily Dx E11.65             carvedilol (COREG) 25 MG tablet  Take 2 tablets by mouth 2 times daily             dicyclomine (BENTYL) 10 MG capsule  Take 1 capsule by mouth 4 times daily as needed (abdominal pain)             digoxin (LANOXIN) 125 MCG tablet  Take 1 tablet by mouth daily             furosemide (LASIX) 40 MG tablet  Take 1 tablet by mouth daily             hydrALAZINE (APRESOLINE) 100 MG tablet  Take 100 mg by mouth 2 times daily             insulin glargine (LANTUS) 100 UNIT/ML injection vial  60 units at bedtime             insulin lispro (HUMALOG) 100 UNIT/ML injection vial  20 units at each mels             ondansetron (ZOFRAN) 4 MG tablet  Take 1 tablet by mouth every 8 hours as needed for Nausea or Vomiting             pantoprazole (PROTONIX) 40 MG tablet  Take 40mg twice daily (1st dose 30min before breakfast) for 30 days.  After 30 days, you make take 40mg once daily before breakfast.             PARoxetine (PAXIL) 40 MG tablet  TAKE 1 TABLET BY MOUTH ONCE DAILY IN THE MORNING             rosuvastatin (CRESTOR) 40 MG tablet  Take 1 tablet by mouth every evening             verapamil (CALAN SR) 120 MG extended release tablet  Take 1 tablet by mouth nightly                 Disposition:   Discharged to Acute Rehab    Condition at discharge: Pt was medically stable at the time of discharge. Activity: activity as tolerated    Total time taken for discharging this patient: 40 minutes. Greater than 70% of time was spent focused exclusively on this patient. Time was taken to review chart, discuss plans with consultants, reconciling medications, discussing plan answering questions with patient.      Anand Gilbert  10/15/2020, 4:05 PM

## 2020-10-15 NOTE — PROGRESS NOTES
Hospitalist Daily Progress Note  Name: Lilliam Garcias  Age: 67 y.o. Gender: female  CodeStatus: Full Code  Allergies: Ibuprofen  Metformin And Related  Darvon [Propoxyphene Hcl]    Chief Complaint:Abdominal Pain    Primary Care Provider: Annie Diaz MD  InpatientTreatment Team: Treatment Team: Attending Provider: Tamara Boss DO; Utilization Reviewer: Mary Caldera RN; Consulting Physician: Jatin Hammond DO; Consulting Physician: Lesa Bettencourt MD; Consulting Physician: Chirag Salguero MD; Consulting Physician: Yuko Welch MD; Consulting Physician: Vitaly Pagan MD; Wound Care: Mark Anthony Pavon RN; : Ahsan Stephenson RN; Tech: Scripped; Patient Care Tech: Trevor Dandy; Registered Nurse: Meera Arellano RN  Admission Date: 10/5/2020      Subjective: Patient seen and bedside. Patient continues to have large amounts bloody stools, and is receiving third unit RBCs since admission. T-max 98.2 vitals otherwise stable. Potassium 2.9 today. Creatinine 3.75. Magnesium 1.9. Glucose improved     Physical Exam  Constitutional:       Appearance: Normal appearance. She is not ill-appearing or diaphoretic. HENT:      Head: Normocephalic and atraumatic. Nose: Nose normal.      Mouth/Throat:      Mouth: Mucous membranes are moist.      Pharynx: Oropharynx is clear. Eyes:      Extraocular Movements: Extraocular movements intact. Pupils: Pupils are equal, round, and reactive to light. Cardiovascular:      Rate and Rhythm: Normal rate. Heart sounds: Murmur present. No friction rub. No gallop. Pulmonary:      Breath sounds: Wheezing and rales present. No rhonchi. Abdominal:      General: There is no distension. Tenderness: There is no abdominal tenderness. There is no guarding. Musculoskeletal: Normal range of motion. General: Swelling present. Neurological:      General: No focal deficit present.       Mental Status: She is alert and oriented to person, place, *   *   BILITOT 0.4   ALKPHOS 215*     No results for input(s): INR in the last 72 hours. No results for input(s): Bola Dec in the last 72 hours. Urinalysis:   Lab Results   Component Value Date    NITRU POSITIVE 10/05/2020    WBCUA >100 10/05/2020    BACTERIA MANY 10/05/2020    RBCUA 20-50 10/05/2020    BLOODU LARGE 10/05/2020    SPECGRAV 1.015 10/05/2020    GLUCOSEU >=1000 10/05/2020       Radiology:   Most recent    Chest CT      WITH CONTRAST:No results found for this or any previous visit. WITHOUT CONTRAST:   Results for orders placed during the hospital encounter of 08/24/20   CT CHEST WO CONTRAST    Narrative EXAMINATION:  CT SCAN THE CHEST    CLINICAL HISTORY:  Short of breath    COMPARISON:  March 24, 2019    TECHNIQUE:  Multiple serial axial images of the chest from the base the neck through the upper abdomen with both sagittal coronal reconstruction was performed without intravenous or oral administration of contrast.    FINDINGS:    There is a patchy mosaic appearance lung parenchyma that can be seen with small airways disease. There is bibasilar areas of atelectasis, scarring. No focal infiltrates. No effusions no pneumothoraces. No significant periaortic adenopathy. There is pretracheal adenopathy. There is multilevel degenerative changes with osteophytes of the thoracic spine. Impression Unremarkable CT scan the chest as described above      All CT scans at this facility use dose modulation, iterative reconstruction, and/or weight based dosing when appropriate to reduce radiation dose to as low as reasonably achievable. Examination: CT ABDOMEN PELVIS WO CONTRAST, CT CHEST WO CONTRAST    Indication:   flank pain     Technique: Multiple serial axial images was performed through the abdomen and pelvis without intravenous or oral administration of contrast..   Images were reconstructed in the axial and coronal and sagittal planes.     Comparison: September 9, 2019    Findings: The liver, gallbladder, spleen, pancreas, adrenals,  are unremarkable. The kidneys show no significant perinephric stranding. No nephrolithiasis. No hydronephrosis or hydroureter. No bladder calculi. Large and small bowel show no sign of obstruction. The appendix is not visualized. No pericecal stranding. No diverticulitis. No free air. No free fluid. The visualized abdominal aorta is of normal size and caliber. No significant retroperitoneal adenopathy. There is multilevel degenerative changes of lumbar spine. There is a grade 1 anterolisthesis of L4 and L5. There is narrowing of the L5-S1 disc space. Impression: UNREMARKABLE CT SCAN OF THE ABDOMEN AND PELVIS AS DESCRIBED ABOVE      All CT scans at this facility use dose modulation, iterative reconstruction, and/or weight based dosing when appropriate to reduce radiation dose to as low as reasonably achievable. CXR      2-view:   Results for orders placed during the hospital encounter of 08/24/20   XR CHEST (2 VW)    Narrative EXAMINATION: XR CHEST (2 VW)     CLINICAL HISTORY:  sob     COMPARISONS: None     FINDINGS:    Two views of the chest are submitted. The cardiac silhouette is enlarged  Pulmonary vascular congestion with increased interstitial markings. Right sided trachea. No focal infiltrates. No effusions. No Pneumothoraces. Impression PULMONARY VASCULAR CONGESTION WITH INCREASED INTERSTITIAL MARKINGS. RADIOGRAPHIC FINDINGS COULD SUGGEST EARLY CHF. CHF. CORRELATE CLINICALLY        Portable:   Results for orders placed during the hospital encounter of 10/05/20   XR CHEST PORTABLE    Narrative EXAMINATION: XR CHEST PORTABLE. DATE AND TIME:10/9/2020 5:00 AM     CLINICAL HISTORY: SHORTNESS OF BREATH      COMPARISONS: OCTOBER 8, 2020     FINDINGS: ET tube and nasogastric tube remain in satisfactory position.  Persistent to increased basilar markings bilaterally with no significant change. Impression NO CHANGE               Echo No results found for this or any previous visit. Assessment/Plan:    Active Hospital Problems    Diagnosis Date Noted    Cardiopulmonary arrest [facility]) [I46.9]      Priority: High    Lumbosacral radiculopathy at L5 [M54.17] 03/16/2015     Priority: High     Class: Acute    Gait abnormality [R26.9] 10/14/2020    Late effects of CVA (cerebrovascular accident) [I69.90] 10/14/2020    Sepsis ([facility] [A41.9] 10/06/2020    Major depressive disorder in remission ([facility] [F32.5] 10/06/2020    Gastroesophageal reflux disease without esophagitis [K21.9] 10/06/2020    Acute cystitis without hematuria [N30.00] 10/06/2020    CKD (chronic kidney disease) stage 4, GFR 15-29 ml/min ([facility] [N18.4] 10/06/2020    COPD exacerbation ([facility] [J44.1] 06/29/2020    Uncontrolled type 2 diabetes mellitus with hyperglycemia (HCC) [E11.65]     CHF (congestive heart failure) ([address] [I50.9]     Reactive depression [F32.9] 07/15/2016    Hypertension [I10]     Hyperlipidemia [E78.5]      SAÚL with a history of CKD following cardiac: Nephrology following,     Hypertrophic cardiomyopathy: continue coreg BID, maximize imdur, continue clonidine 0.2 TID, verapamil Aldactone    Type 2 diabetes:    Sepsis 2/2 E.coli UTI: resolved. Changed from zosyn to ceftin.  2 days left    Hypertensive urgency: PRN IV labetalol as needed. Clonidine 0.1 TID, coreg 25 BID, imdur 30, verapamil, aldactone. Dark tarry stools with microcytic anemia: GI consulted. No Cscope today, hx of push enteroscopy, awaiting pill cam outpatient. Trend Hgb. Transfuse for hemoglobin less than 7    Likely sleep apnea: We will need outpatient PFTs for CPAP adjustment    Additional work up or/and treatment plan may be added today or then after based on clinical progression. I am managing a portion of pt care. Some medical issues are handled byother specialists. Additional work up and treatment should be done in out pt setting by pt PCP and other out pt providers. In addition to examining and evaluating pt, I spent additional time explaining care, normaland abnormal findings, and treatment plan. All of pt questions were answered. Counseling, diet and education were provided. Case will be discussed with nursing staff when appropriate. Family will be updated if and whenappropriate.       Electronically signed by Delfino Wright DO on 10/14/2020 at 8:08 PM negative...

## 2020-10-15 NOTE — PROGRESS NOTES
Progress Note  Date:10/14/2020       UI:[id number]  Patient Cami Luis     YOB: 1945     Age:75 y.o. Chief complaint uncontrolled diabetes  Subjective    Subjective:  Symptoms:  Stable. She reports malaise and weakness. Diet:  Poor intake. Activity level: Impaired due to weakness. Review of Systems   Constitutional: Positive for fatigue. Neurological: Positive for weakness. All other systems reviewed and are negative. Objective         Vitals Last 24 Hours:  TEMPERATURE:  Temp  Av.2 °F (36.8 °C)  Min: 97.5 °F (36.4 °C)  Max: 98.8 °F (37.1 °C)  RESPIRATIONS RANGE: Resp  Av.7  Min: 18  Max: 22  PULSE OXIMETRY RANGE: SpO2  Av %  Min: 100 %  Max: 100 %  PULSE RANGE: Pulse  Av.4  Min: 70  Max: 80  BLOOD PRESSURE RANGE: Systolic (52NHJ), ZCU:986 , Min:124 , LCW:140   ; Diastolic (70JIL), MZR:96, Min:46, Max:70    I/O (24Hr): Intake/Output Summary (Last 24 hours) at 10/14/2020 2225  Last data filed at 10/14/2020 1800  Gross per 24 hour   Intake 100 ml   Output 3250 ml   Net -3150 ml     Objective:  General Appearance:  Ill-appearing. Vital signs: (most recent): Blood pressure (!) 178/70, pulse 70, temperature 97.8 °F (36.6 °C), temperature source Oral, resp. rate 18, height 4' 11\" (1.499 m), weight 160 lb 7.9 oz (72.8 kg), SpO2 100 %, not currently breastfeeding. Vital signs are normal.    HEENT: Normal HEENT exam.    Lungs:  Normal effort. Heart: Normal rate. Abdomen: Abdomen is soft. Extremities: Normal range of motion. There is dependent edema. Neurological: Patient is alert. Results for Kyler Sarmiento (MRN [id number]) as of 10/14/2020 22:27   Ref.  Range 10/13/2020 21:35 10/14/2020 05:30 10/14/2020 11:02 10/14/2020 16:05 10/14/2020 20:12   POC Glucose Latest Ref Range: 60 - 115 mg/dl 201 (H) 316 (H) 264 (H) 127 (H) 130 (H)       Labs/Imaging/Diagnostics    Labs:  CBC:  Recent Labs     10/13/20  0400 10/13/20  1405

## 2020-10-15 NOTE — PROGRESS NOTES
Physical Therapy Med Surg Daily Treatment Note  Facility/Department: [address] [facility]  [facility]       NAME: Tylor Padilla  : 1945 (83 y.o.)  MRN: [id number]  CODE STATUS: Full Code    Date of Service: 10/15/2020    Patient Diagnosis(es): Sepsis [facility]) [A41.9]   Chief Complaint   Patient presents with    Abdominal Pain     Patient Active Problem List    Diagnosis Date Noted    Cardiopulmonary arrest [facility])      Priority: High    Lumbosacral radiculopathy at L5 2015     Priority: High     Class: Acute    Spinal stenosis of lumbar region with neurogenic claudication 2015     Priority: High     Class: Chronic    High risk medication use-[place] - 17 OARRS PM&R, 18 OARRS PM&R, 18 OARRS PM&R, Urine Drug screen negative 17 PM&R--MED CONTRACT 2014     Priority: High    Gait abnormality 10/14/2020    Late effects of CVA (cerebrovascular accident) 10/14/2020    Sepsis ([address] 10/06/2020    Major depressive disorder in remission ([address] 10/06/2020    Gastroesophageal reflux disease without esophagitis 10/06/2020    Acute cystitis without hematuria 10/06/2020    CKD (chronic kidney disease) stage 4, GFR 15-29 ml/min ([address] 10/06/2020    Adenomatous polyp of sigmoid colon     Adenomatous polyp of transverse colon     Acute encephalopathy     Flash pulmonary edema ([address] 2020    Acute on chronic kidney failure ([address] 2020    Dysarthria     Chronic fatigue     Symptomatic anemia 2020    COPD exacerbation ([address] 2020    Anemia     AVM (arteriovenous malformation)     Gastrointestinal hemorrhage 2020    HOCM (hypertrophic obstructive cardiomyopathy) ([address] 2019    Hypoglycemia     SAÚL (acute kidney injury) ([address] 10/23/2019    Acute on chronic diastolic (congestive) heart failure ([address] 10/13/2019    Chronic obstructive pulmonary disease with acute exacerbation ([address] 10/12/2019    Hypertensive urgency 10/09/2019  Uncontrolled type 2 diabetes mellitus with hyperglycemia ([facility])     Acute on chronic diastolic heart failure ([address] 10/08/2019    CHF (congestive heart failure) ([facility])     PMB (postmenopausal bleeding)     Acute combined systolic and diastolic CHF, NYHA class 1 ([address] 03/24/2019    Osteoarthritis of spine with radiculopathy, lumbar region 11/07/2018    Myalgia 05/11/2018    Intercostal neuropathy 05/11/2018    Diabetic asymmetric polyneuropathy ([address] 04/11/2017    Cervical radicular pain 12/03/2016    Reactive depression 07/15/2016    Diabetic radiculopathy ([address] 07/15/2016    Insomnia secondary to chronic pain 04/15/2016    Non-compliant patient NO showed FU 1/10/17 Dr Kirit Recio 09/24/2015    Impaired mobility and activities of daily living 03/28/2015    Neck pain 03/04/2015    Artificial lens present 10/03/2014    Presbyopia 10/03/2014    Astigmatism, regular 10/03/2014    Cataract, nuclear sclerotic senile 10/03/2014    IDDM (insulin dependent diabetes mellitus) 10/03/2014    Regular astigmatism 10/03/2014    Nuclear senile cataract 10/03/2014    Memory deficit 06/04/2014    SOB (shortness of breath) on exertion 03/14/2014    Chest pain 03/14/2014    CTS (carpal tunnel syndrome) 02/09/2014    DJD (degenerative joint disease), lumbar 02/08/2014    Lumbosacral radiculopathy at S1 02/08/2014    DDD (degenerative disc disease), lumbar 02/08/2014    Dysphonia 02/08/2014    Insomnia 12/04/2013    Smoker 06/18/2013    Hypertension     Hyperlipidemia     Uncontrolled type 2 diabetes mellitus with complication, without long-term current use of insulin ([facility])     Fibromyalgia     Anxiety         Past Medical History:   Diagnosis Date    Anxiety     Asthma     dx 2019 / has smoked since age 14    CHF (congestive heart failure) ([facility])     Chronic back pain     Bilateral L5 S1 Radic on emg--surprisingly worse on the left than the right--pt's symptoms and her MRI show worse on the right    Chronic obstructive pulmonary disease with acute exacerbation (HCC) 10/12/2019    Depression     Fibromyalgia     Hyperlipidemia     meds > 8 yrs    Hypertension     meds > 45 yrs    On home O2     2l per n/c at bedtime mostly,     Osteoarthritis     Type II diabetes mellitus, uncontrolled ([facility]     hx > 8 yrs    Unspecified sleep apnea      Past Surgical History:   Procedure Laterality Date    BACK SURGERY  2017    lumbar disc    CARDIAC CATHETERIZATION  11/3/14     DR. MIRELES / no stents    COLONOSCOPY  08/29/2016    w/polypectomy     COLONOSCOPY N/A 9/29/2020    COLONOSCOPY WITH POLYPECTOMY performed by Sean Hanna MD at [facility] N/A 10/7/2019    EUA HYSTEROSCOPY DILATATION AND CURETTAGE performed [facility] DO [facility] [address], COLON, DIAGNOSTIC      EYE SURGERY      [place] with IOL [address]U / [address] [place]  [id number]    umbilical hernia repair    DE ESOPHAGOGASTRODUODENOSCOPY TRANSORAL DIAGNOSTIC N/A 3/24/2017    EGD ESOPHAGOGASTRODUODENOSCOPY performed by Shaye Wharton MD at [place] [id number] 2/8/2018    negative findings    DE REVISE MEDIAN N/CARPAL TUNNEL SURG Left 6/5/2017    LEFT  CARPAL TUNNEL RELEASE performed by Inez Babin MD at [facility],3+C/Z,ZMXLL N/A 2/8/2018    TONSILLECTOMY      as child    UPPER GASTROINTESTINAL ENDOSCOPY  08/26/2016    w/bx     UPPER GASTROINTESTINAL ENDOSCOPY N/A 2/25/2020    EGD possible biopsy performed by Sean Hanna MD at [address] [place] [address] 7/1/2020    EGD PUSH ENTEROSCOPY performed by Karina David MD at [facility]          Restrictions  Restrictions/Precautions: Fall Risk    SUBJECTIVE   Subjective  Subjective: I am glad you came back.   General Comment  Comments: Pt had partial tx this am.    Pre-Session Pain Report  Pre Treatment Pain Screening  Pain at present: 0  Intervention List: Patient able to continue with treatment          Post-Session Pain Report  Pain Assessment  Pain Level: 0         OBJECTIVE   Orientation  Overall Orientation Status: Within Functional Limits  Orientation Level: Oriented to place;Oriented to situation;Oriented to person    Bed mobility  Rolling to Left: Stand by assistance  Rolling to Right: Stand by assistance  Supine to Sit: Stand by assistance  Sit to Supine: Stand by assistance  Comment: bed flat with heavy use of hand rails; vc's for indep technique    Transfers  Sit to Stand: Stand by assistance  Stand to sit: Stand by assistance  Comment: vc's for hand placement and safety; Pt pulls up from [employer]. Ambulation 1  Surface: level tile  Device: Rolling Walker  Assistance: Stand by assistance  Quality of Gait: FF posture, shortened step length and steady pace. vc's for safety with pacing in tight spaces. Gait Deviations: Decreased step length;Decreased step height  Distance: 20'x2                                         Activity Tolerance  Activity Tolerance: Patient Tolerated treatment well          ASSESSMENT   Assessment: Pt ambulated with WW with SBA. Pt slightly impulsive and quick paced in tight spaces. Cueing for safety with good follow through. Discharge Recommendations:  Continue to assess pending progress    Goals  Long term goals  Long term goal 1: Bed mobility with indep  Long term goal 2: Functional transfers with indep  Long term goal 3: Amb 50ft with LRAD and indep  Long term goal 4: 4 steps with handrail and SBA to enter/exit home  Patient Goals   Patient goals : \"return home\"    PLAN    Safety Devices  Type of devices: Left in bed;Call light within reach; Bed alarm in place; All fall risk precautions in place     AMPAC (6 CLICK) BASIC MOBILITY  AM-PAC Inpatient Mobility Raw Score : 15      Therapy Time   Individual   Time In 1510   Time Out 1527   Minutes 17      Gait - 12 mins  Trsf - 5 mins       Rebekah Radhika Chavez, PTA, 10/15/20 at 3:33 PM       Definitions for assistance levels  Independent = pt does not require any physical supervision or assistance from another person for activity completion. Device may be needed.   Stand by assistance = pt requires verbal cues or instructions from another person, close to but not touching, to perform the activity  Minimal assistance= pt performs 75% or more of the activity; assistance is required to complete the activity  Moderate assistance= pt performs 50% of the activity; assistance is required to complete the activity  Maximal assistance = pt performs 25% of the activity; assistance is required to complete the activity  Dependent = pt requires total physical assistance to accomplish the task

## 2020-10-15 NOTE — PROGRESS NOTES
INPATIENT PROGRESS NOTES    PATIENT NAME: Wendall Nyhan  MRN: [id number]  SERVICE DATE:  October 15, 2020   SERVICE TIME:  2:28 PM      PRIMARY SERVICE: Pulmonary Disease    CHIEF COMPLAINTS: Post cardiac arrest    INTERVAL HPI: Patient seen and examined at bedside, Interval Notes, orders reviewed. Nursing notes noted    Doing better, no chest pain, no shortness of breath, currently on room air, getting stronger and participating with rehab    Review of system:     GI Abdominal pain No  Skin Rash No    Social History     Tobacco Use    Smoking status: Former Smoker     Packs/day: 1.00     Years: 59.00     Pack years: 59.00     Types: Cigarettes     Start date: 2017    Smokeless tobacco: Never Used    Tobacco comment: quit 2-3 weeks ago    Substance Use Topics    Alcohol use: Not Currently         Problem Relation Age of Onset    Heart Disease Father         cardiac bypass    Arthritis Father     Arthritis Mother     Other Mother          at age 80    Other Sister         [facility]    No Known Problems Daughter     Stroke Son          OBJECTIVE    Body mass index is 32.42 kg/m². PHYSICAL EXAM:  Vitals:  BP (!) 125/46   Pulse 68   Temp 98.3 °F (36.8 °C) (Oral)   Resp 18   Ht 4' 11\" (1.499 m)   Wt 160 lb 7.9 oz (72.8 kg)   LMP  (LMP Unknown)   SpO2 100%   BMI 32.42 kg/m²     General: alert, cooperative, no distress  Head: normocephalic, atraumatic  Eyes:No gross abnormalities. ENT:  MMM no lesions  Neck:  supple and no masses  Chest : Minimal basal rales, good air movement, no wheezing, nontender, tympanic  Heart[de-identified] Heart sounds are normal.  Regular rate and rhythm without murmur, gallop or rub. ABD:  symmetric, soft, non-tender  Musculoskeletal : no cyanosis, no clubbing and no edema  Neuro:  Grossly normal  Skin: No rashes or nodules noted.   Lymph node:  no cervical nodes  Urology: No Sanchez   Psychiatric: appropriate    DATA:   Recent Labs     10/14/20  0600  10/14/20  2030 10/15/20  0533   WBC 13.5*  --   --  16.1*   HGB 7.2*   < > 8.6* 9.0*   HCT 21.9*   < > 26.6* 27.2*   MCV 78.1*  --   --  80.9*     --   --  164    < > = values in this interval not displayed. Recent Labs     10/13/20  1408 10/14/20  0600 10/15/20  0533   * 135 138   K 3.5  3.5 2.9* 3.5   CL 94* 96 99   CO2 27 26 29   BUN 47* 45* 39*   CREATININE 3.80* 3.75* 3.56*   GLUCOSE 243* 221* 44*   CALCIUM 7.3* 8.1* 8.2*   PROT 4.7*  --   --    LABALBU 2.6*  --   --    BILITOT 0.4  --   --    ALKPHOS 215*  --   --    *  --   --    *  --   --    LABGLOM 11.5* 11.7* 12.4*   GFRAA 14.0* 14.2* 15.1*   GLOB 2.1*  --   --        MV Settings:     Vent Mode: (S) CPAP  Vt Ordered: 280 mL  Rate Set: 12 bmp  FiO2 : 100 %  PEEP/CPAP: 5  Pressure Support: 5 cmH20  Peak Inspiratory Pressure: 12 cmH2O  Mean Airway Pressure: 7.7 cmH20  I:E Ratio: 1:5.20    No results for input(s): PHART, RDO4OBG, PO2ART, VNJ5KLR, BEART, E7IPFKKD in the last 72 hours. O2 Device: None (Room air)  O2 Flow Rate (L/min): 3 L/min    Dietary Nutrition Supplements: Diabetic Oral Supplement  DIET CARB CONTROL; Carb Control: 3 carb choices (45 gms)/meal; Low Sodium (2 GM);  Daily Fluid Restriction: 2000 ml     MEDICATIONS during current hospitalization:    Continuous Infusions:   dextrose      dextrose         Scheduled Meds:   furosemide  40 mg Intravenous Daily    [START ON 10/19/2020] epoetin dick-epbx  8,000 Units Subcutaneous Weekly    insulin glargine  35 Units Subcutaneous Nightly    insulin lispro  6 Units Subcutaneous TID WC    potassium chloride  20 mEq Oral TID    cefUROXime  250 mg Oral 2 times per day    cloNIDine  0.2 mg Oral TID    isosorbide mononitrate  60 mg Oral Daily    insulin lispro  0-12 Units Subcutaneous TID WC    insulin lispro  0-6 Units Subcutaneous Nightly    carvedilol  25 mg Oral BID WC    spironolactone  25 mg Oral Daily    sodium chloride  20 mL Intravenous Once    verapamil  120 mg Oral Nightly    sodium chloride flush  10 mL Intravenous 2 times per day    pantoprazole  40 mg Intravenous Daily    And    sodium chloride (PF)  10 mL Intravenous Daily    [Held by provider] PARoxetine  40 mg Oral QAM    rosuvastatin  40 mg Oral QPM       PRN Meds:hydrOXYzine, glucose, dextrose, glucagon (rDNA), dextrose, sodium chloride flush, lidocaine viscous hcl, magic (miracle) mouthwash, potassium chloride, sodium chloride flush, promethazine **OR** ondansetron, ondansetron, acetaminophen **OR** acetaminophen, glucose, dextrose, glucagon (rDNA), dextrose, iopamidol    Radiology  Ct Abdomen Pelvis Wo Contrast Additional Contrast? None    Result Date: 10/6/2020  CT ABDOMEN PELVIS WO CONTRAST: 10/5/2020 CLINICAL HISTORY:  recent colonoscopy, fever, vomiting, abd pain . COMPARISON: 8/24/2020. TECHNIQUE: Spiral images were obtained of the abdomen and pelvis without contrast. All CT scans at this facility use dose modulation, iterative reconstruction, and/or weight based dosing when appropriate to reduce radiation dose to as low as reasonably achievable. FINDINGS: A moderate amount of stool is present within the colon and rectum. There is no abnormal small bowel dilatation, significant inflammation, ascites, lymphadenopathy is, or other significant change from the prior study identified. The unenhanced liver, gallbladder, pancreas, spleen, adrenal glands, kidneys, uterus, adnexa, urinary bladder, and additional images of pelvis appear within normal limits. Moderate atherosclerotic plaquing of a normal caliber abdominal aorta and branch vessels is again noted. Minimal probable atelectasis is noted of the visualized lung bases. Moderately extensive degenerative changes of the thoracolumbar spine with grade 1 anterolisthesis of L4 over L5 is again noted. NO ACUTE INTRA-ABDOMINAL PROCESS OR SIGNIFICANT CHANGE FROM 8/24/2020 IDENTIFIED.      Ct Head Wo Contrast    Result Date: 10/9/2020  EXAMINATION: CT of the brain without contrast HISTORY: Patient is unresponsive. Generalized weakness. Fever. COMPARISON: CT brain from October 7, 2020 TECHNIQUE: Multiple contiguous axial images were obtained of the brain from the skull base through the vertex. Multiplanar reformats were obtained. FINDINGS: Prominence of the sulci and ventricles compatible with mild generalized parenchymal volume loss. Areas of bilateral supratentorial white matter hypoattenuation are nonspecific but most likely related to chronic small vessel ischemic changes in a patient of this age. Remote left thalamic lacunar infarct. Remote posterior left frontal/parietal lacunar infarct Gray-white matter differentiation is preserved. No acute hemorrhage or abnormal extra-axial fluid collection. No mass effect or midline shift. The visualized paranasal sinuses and mastoid air cells are clear. Calvarium is intact. No acute intracranial process or significant interval change. All CT scans at this facility use dose modulation, iterative reconstruction, and/or weight based dosing when appropriate to reduce radiation dose to as low as reasonably achievable. Ct Head Wo Contrast    Result Date: 10/7/2020  CT Brain Contrast medium:  Not utilized. History:  Cardiac arrest Comparison:  CT brain, August 25, 2020, August 24, 2020. MRI brain, August 27, 2020. Findings: Extra-axial spaces:  Normal. Intracranial hemorrhage:  None. Ventricular system: Ventricles mildly enlarged. Sulci mildly prominent. Basal Cisterns:  Normal. Cerebral Parenchyma: 4 mm area decreased attenuation exerting no mass effect left thalamus. Midline Shift:  None. Cerebellum:  Normal. Paranasal sinuses and mastoid air cells: Opacification bilateral ethmoid sinuses. Visualized Orbits:  Normal.     Impression: Mild cerebral atrophy. Remote left thalamic lacunar infarct. Ethmoid sinusitis.  All CT scans at this facility use dose modulation, iterative reconstruction, and/or weight based dosing when appropriate to reduce radiation dose to as low as reasonably achievable. Xr Chest Portable    Result Date: 10/10/2020  EXAMINATION: XR CHEST PORTABLE. DATE AND TIME:10/9/2020 5:00 AM CLINICAL HISTORY: SHORTNESS OF BREATH  COMPARISONS: OCTOBER 8, 2020  FINDINGS: ET tube and nasogastric tube remain in satisfactory position. Persistent to increased basilar markings bilaterally with no significant change. NO CHANGE     Xr Chest Portable    Result Date: 10/8/2020  XR CHEST PORTABLE : 10/8/2020 CLINICAL HISTORY:  resp failure . COMPARISON: 10/7/2020. TECHNIQUE: A portable upright AP radiograph of the chest was obtained. FINDINGS: An endotracheal tube has been mildly withdrawn with its tip approximately 3 cm above the mojgan. There is been interval placement of an orogastric tube with its tip overlying the body of the stomach. Shallow inspiratory volumes are present with mild to moderate airspace infiltrates and bandlike opacities, predominantly of the mid to lower lung fields. A very small right pleural effusion is suggested. There is no significant left pleural effusion, pneumothorax, or displaced fractures identified. The heart remains mildly enlarged with mild vascular congestion, versus technique     ENDOTRACHEAL AND OROGASTRIC TUBES IN EXPECTED POSITION. POSSIBLE MILD CARDIAC DECOMPENSATION, WITH MILD TO MODERATE ATELECTASIS/EDEMA OF THE MID TO LOWER LUNG [place]. BRONCHOPNEUMONIA SHOULD BE EXCLUDED CLINICALLY. SUSPECT VERY SMALL RIGHT PLEURAL EFFUSION. Xr Chest Portable    Result Date: 10/7/2020  EXAMINATION: CHEST PORTABLE VIEW  CLINICAL HISTORY: Intubation COMPARISONS: October 5, 2020  FINDINGS: Single  views of the chest is submitted. There is an endotracheal tube. The tip lies at the orifice of the right mainstem bronchus. Repositioning is recommended. The cardiac silhouette is enlarged. Pulmonary vascular unremarkable. Right sided trachea. No focal infiltrates. No Pneumothoraces. tip of the PICC line to lie in the superior vena cava. The patient tolerated the procedure well and without complications. Number of films: 4 Fluoroscopy time: 23.7 seconds CONCLUSION: SUCCESSFUL RIGHT PICC PLACEMENT WITHOUT IMMEDIATE COMPLICATIONS.              IMPRESSION AND SUGGESTION:  Patient is at risk due to   · Status post cardiac arrest, recovered completely  · Post cardiac arrest encephalopathy,  · Acute on chronic hypoxic and hypercapnic respiratory failure secondary to #1 currently at baseline  · Septic shock due to UTI, and resolved  · SAÚL, improved  · Hyperglycemia    Recommendation  · Pulmonary hygiene  · Encourage active  · Remove Sanchez  · Home O2 eval prior to discharge from rehab  · Complete oral antibiotic course  · Further work-up as outpatient including PFT and sleep study for the chronic hypercapnia  · Follow-up with Dr. Pauline Delatorre as outpatient      Electronically signed by Yuval Gannon MD,  FCCP   on 10/15/2020 at 2:28 PM

## 2020-10-15 NOTE — PROGRESS NOTES
Nephrology Progress Note    Assessment:  SAÚL stable   DM type- labile  Hypertension controlled post medications  OHDx HCM  Hx CVA  Anemia      Plan:will reduce lasix 40mg qd  Rehab today increase activity  Finishing blood transfusion    Patient Active Problem List:     Hypertension     Hyperlipidemia     Uncontrolled type 2 diabetes mellitus with complication, without long-term current use of insulin (HCC)     Fibromyalgia     Anxiety     Smoker     Insomnia     DJD (degenerative joint disease), lumbar     Lumbosacral radiculopathy at S1     DDD (degenerative disc disease), lumbar     Dysphonia     CTS (carpal tunnel syndrome)     High risk medication use-Norco - 12/20/17 [facility] PM&R, 02/20/18 [facility] PM&R, 03/07/18 [facility] PM&R, Urine Drug screen negative 02/06/17 PM&R--MED CONTRACT 2/6/17     SOB (shortness of breath) on exertion     Chest pain     Memory deficit     Artificial lens present     Presbyopia     Astigmatism, regular     Cataract, nuclear sclerotic senile     IDDM (insulin dependent diabetes mellitus)     Regular astigmatism     Nuclear senile cataract     Neck pain     Lumbosacral radiculopathy at L5     Spinal stenosis of lumbar region with neurogenic claudication     Impaired mobility and activities of daily living     Non-compliant patient NO showed FU 1/10/17 Dr Cyd Councilman     Insomnia secondary to chronic pain     Reactive depression     Diabetic radiculopathy (HCC)     Cervical radicular pain     Diabetic asymmetric polyneuropathy (HCC)     Myalgia     Intercostal neuropathy     Osteoarthritis of spine with radiculopathy, lumbar region     Acute combined systolic and diastolic CHF, NYHA class 1 (HCC)     PMB (postmenopausal bleeding)     Acute on chronic diastolic heart failure (HCC)     CHF (congestive heart failure) ([address]     Hypertensive urgency     Uncontrolled type 2 diabetes mellitus with hyperglycemia ([address]     Chronic obstructive pulmonary disease with acute exacerbation ([address]     Acute on chronic diastolic (congestive) heart failure (HCC)     SAÚL (acute kidney injury) (HCC)     Hypoglycemia     HOCM (hypertrophic obstructive cardiomyopathy) (HCC)     Gastrointestinal hemorrhage     COPD exacerbation (HCC)     Anemia     AVM (arteriovenous malformation)     Symptomatic anemia     Flash pulmonary edema (HCC)     Acute on chronic kidney failure (HCC)     Dysarthria     Chronic fatigue     Acute encephalopathy     Adenomatous polyp of sigmoid colon     Adenomatous polyp of transverse colon     Sepsis (HCC)     Major depressive disorder in remission ([facility]     Gastroesophageal reflux disease without esophagitis     Acute cystitis without hematuria     CKD (chronic kidney disease) stage 4, GFR 15-29 ml/min (HCC)     Cardiopulmonary arrest ([facility]     Gait abnormality     Late effects of CVA (cerebrovascular accident)      Subjective:  Admit Date: 10/5/2020    Interval History: feeling about the same     Medications:  Scheduled Meds:   furosemide  40 mg Intravenous Daily    potassium chloride  20 mEq Oral TID    cefUROXime  250 mg Oral 2 times per day    insulin lispro  10 Units Subcutaneous TID WC    insulin glargine  50 Units Subcutaneous Nightly    cloNIDine  0.2 mg Oral TID    isosorbide mononitrate  60 mg Oral Daily    insulin lispro  0-12 Units Subcutaneous TID WC    insulin lispro  0-6 Units Subcutaneous Nightly    carvedilol  25 mg Oral BID WC    spironolactone  25 mg Oral Daily    sodium chloride  20 mL Intravenous Once    verapamil  120 mg Oral Nightly    sodium chloride flush  10 mL Intravenous 2 times per day    pantoprazole  40 mg Intravenous Daily    And    sodium chloride (PF)  10 mL Intravenous Daily    [Held by provider] PARoxetine  40 mg Oral QAM    rosuvastatin  40 mg Oral QPM     Continuous Infusions:   dextrose      dextrose         CBC:   Recent Labs     10/14/20  0600  10/14/20  2030 10/15/20  0533   WBC 13.5*  --   --  16.1*   HGB 7.2*   < > 8.6* 9.0*     -- --  164    < > = values in this interval not displayed. CMP:    Recent Labs     10/13/20  1408 10/14/20  0600 10/15/20  0533   * 135 138   K 3.5  3.5 2.9* 3.5   CL 94* 96 99   CO2 27 26 29   BUN 47* 45* 39*   CREATININE 3.80* 3.75* 3.56*   GLUCOSE 243* 221* 44*   CALCIUM 7.3* 8.1* 8.2*   LABGLOM 11.5* 11.7* 12.4*     Troponin: No results for input(s): TROPONINI in the last 72 hours. BNP: No results for input(s): BNP in the last 72 hours. INR: No results for input(s): INR in the last 72 hours. Lipids: No results for input(s): CHOL, LDLDIRECT, TRIG, HDL, AMYLASE, LIPASE in the last 72 hours. Liver:   Recent Labs     10/13/20  1408   *   *   ALKPHOS 215*   PROT 4.7*   LABALBU 2.6*   BILITOT 0.4     Iron:  No results for input(s): IRONS, FERRITIN in the last 72 hours. Invalid input(s): LABIRONS  Urinalysis: No results for input(s): UA in the last 72 hours.     Objective:  Vitals: BP (!) 178/70   Pulse 70   Temp 97.8 °F (36.6 °C) (Oral)   Resp 18   Ht 4' 11\" (1.499 m)   Wt 160 lb 7.9 oz (72.8 kg)   LMP  (LMP Unknown)   SpO2 100%   BMI 32.42 kg/m²    Wt Readings from Last 3 Encounters:   10/10/20 160 lb 7.9 oz (72.8 kg)   09/29/20 130 lb (59 kg)   08/24/20 130 lb (59 kg)      24HR INTAKE/OUTPUT:      Intake/Output Summary (Last 24 hours) at 10/15/2020 0827  Last data filed at 10/15/2020 0730  Gross per 24 hour   Intake 100 ml   Output 3350 ml   Net -3250 ml       General: alert, in no apparent distress  HEENT: normocephalic, atraumatic, anicteric  Neck: supple, no mass  Lungs: non-labored respirations, clear to auscultation bilaterally  Heart: regular rate and rhythm, 1/6 systolic murmurs or rubs  Abdomen: soft, non-tender, non-distended  Ext: no cyanosis, no peripheral edema  Neuro: alert and oriented, no gross abnormalities  Psych: normal mood and affect  Skin: no rash      Electronically signed by Aram Mosher MD

## 2020-10-15 NOTE — FLOWSHEET NOTE
Sanchez catheter dc'd per order. Pt and dtr worried about getting IV Lasix so late and getting up all night. Message sent to Dr Marshall Hernández to see if we can hold PM dose of Lasix, awaiting response.  Electronically signed by Anahi Chandler RN on 10/15/2020 at 6:04 PM

## 2020-10-15 NOTE — PROGRESS NOTES
Subjective: The patient complains of severe  acute on chronic progressive weakness and confusion partially relieved by rest, PT, transfusion and exacerbated by prep of her H&H and need for transfusion. I am concerned about patients anxiety and confusion in such a small room with no window I support her getting moved to a larger room I am also hoping that she gets approved for acute rehab today which would solve that problem as well. Reviewed recent nursing note, \"   Patient received 1 unit PRBC. H&H lab drawn, and Hmg is improved at 8.6. She is sad but calm at this time and just wants to sleep \". She is feeling much better today has more energy is in a larger room her 's and visiting she is eating breakfast she is eager for rehabilitation. ROS x10: The patient also complains of severely impaired mobility and activities of daily living. Otherwise no new problems with vision, hearing, nose, mouth, throat, dermal, cardiovascular, GI, , pulmonary, musculoskeletal, psychiatric or neurological. See Rehab consult on Rehab chart . Vital signs:  BP (!) 178/70   Pulse 70   Temp 97.8 °F (36.6 °C) (Oral)   Resp 18   Ht 4' 11\" (1.499 m)   Wt 160 lb 7.9 oz (72.8 kg)   LMP  (LMP Unknown)   SpO2 100%   BMI 32.42 kg/m²   I/O:   PO/Intake:    fair PO intake, no problems observed or reported. Bowel/Bladder:  continent, no problems noted. General:  Patient is well developed, adequately nourished, non-obese and     well kempt. HEENT:    PERRLA, hearing intact to loud voice, external inspection of ear     and nose benign. Inspection of lips, tongue and gums benign  Musculoskeletal: No significant change in strength or tone. All joints stable. Inspection and palpation of digits and nails show no clubbing,       cyanosis or inflammatory conditions. Neuro/Psychiatric: Affect: flat but pleasant. Alert and oriented to self and     situation.   No significant change in deep tendon reflexes or     sensation  Lungs:  Diminished, CTA-B. Respiration effort is normal at rest.     Heart:   S1 = S2,  RRR. No loud murmurs. Abdomen:  Soft, non-tender, no enlargement of liver or spleen. Extremities:  No significant lower extremity edema or tenderness. Skin:    BUE bruises dt blood draws, no visualized or palpated problems. Rehabilitation:  Physical therapy: FIMS:  Bed Mobility: Scooting: Maximal assistance    Transfers: Sit to Stand: Contact guard assistance  Stand to sit: Contact guard assistance, Ambulation 1  Surface: level tile  Device: Rolling Walker  Assistance: Contact guard assistance  Quality of Gait: Side stepping at EOB, shuffling pattern  Distance: 3ft down, 3 ft back. Comments: Poor posture, fwd flexed trunk, rapid fatigue.,      FIMS:  ,  , Assessment: pt motivated but fatigues quickly. pt declines to sit in the chair this date.     Occupational therapy: FIMS:   ,  ,      Speech therapy: FIMS:        Lab/X-ray studies reviewed, analyzed and discussed with patient and staff:   Recent Results (from the past 24 hour(s))   POCT Glucose    Collection Time: 10/14/20 11:02 AM   Result Value Ref Range    POC Glucose 264 (H) 60 - 115 mg/dl    Performed on ACCU-CHEK    Hemoglobin and Hematocrit, Blood    Collection Time: 10/14/20 12:35 PM   Result Value Ref Range    Hemoglobin 6.8 (LL) 12.0 - 16.0 g/dL    Hematocrit 20.9 (LL) 37.0 - 47.0 %   POCT Glucose    Collection Time: 10/14/20  4:05 PM   Result Value Ref Range    POC Glucose 127 (H) 60 - 115 mg/dl    Performed on ACCU-CHEK    TYPE AND SCREEN    Collection Time: 10/14/20  4:26 PM   Result Value Ref Range    ABO/Rh O POS     Antibody Screen NEG    PREPARE RBC (CROSSMATCH), 1 Units    Collection Time: 10/14/20  4:26 PM   Result Value Ref Range    Product Code Blood Bank [id number]     Description Blood Bank Red Blood Cells, Apheresis, Leuko-reduced     Unit Number [id number]     Dispense Status Blood Bank issued    POCT Glucose Collection Time: 10/14/20  8:12 PM   Result Value Ref Range    POC Glucose 130 (H) 60 - 115 mg/dl    Performed on ACCU-CHEK    Hemoglobin and Hematocrit, Blood    Collection Time: 10/14/20  8:30 PM   Result Value Ref Range    Hemoglobin 8.6 (L) 12.0 - 16.0 g/dL    Hematocrit 26.6 (L) 37.0 - 47.0 %   CBC    Collection Time: 10/15/20  5:33 AM   Result Value Ref Range    WBC 16.1 (H) 4.8 - 10.8 K/uL    RBC 3.36 (L) 4.20 - 5.40 M/uL    Hemoglobin 9.0 (L) 12.0 - 16.0 g/dL    Hematocrit 27.2 (L) 37.0 - 47.0 %    MCV 80.9 (L) 82.0 - 100.0 fL    MCH 26.7 (L) 27.0 - 31.3 pg    MCHC 33.0 33.0 - 37.0 %    RDW 22.0 (H) 11.5 - 14.5 %    Platelets 762 939 - 095 K/uL   Basic Metabolic Panel w/ Reflex to MG    Collection Time: 10/15/20  5:33 AM   Result Value Ref Range    Sodium 138 135 - 144 mEq/L    Potassium reflex Magnesium 3.5 3.4 - 4.9 mEq/L    Chloride 99 95 - 107 mEq/L    CO2 29 20 - 31 mEq/L    Anion Gap 10 9 - 15 mEq/L    Glucose 44 (LL) 70 - 99 mg/dL    BUN 39 (H) 8 - 23 mg/dL    CREATININE 3.56 (H) 0.50 - 0.90 mg/dL    GFR Non-African American 12.4 (L) >60    GFR  15.1 (L) >60    Calcium 8.2 (L) 8.5 - 9.9 mg/dL   Magnesium    Collection Time: 10/15/20  5:33 AM   Result Value Ref Range    Magnesium 1.8 1.7 - 2.4 mg/dL   POCT Glucose    Collection Time: 10/15/20  6:22 AM   Result Value Ref Range    POC Glucose 49 (L) 60 - 115 mg/dl    Performed on ACCU-CHEK    POCT Glucose    Collection Time: 10/15/20  7:16 AM   Result Value Ref Range    POC Glucose 91 60 - 115 mg/dl    Performed on ACCU-CHEK        Previous extensive, complex labs, notes and diagnostics reviewed and analyzed.      ALLERGIES:    Allergies as of 10/05/2020 - In progress 10/05/2020   Allergen Reaction Noted    Ibuprofen Nausea Only 11/25/2014    Metformin and related  01/29/2014    Darvon [propoxyphene hcl] Nausea And Vomiting 09/07/2012      (please also verify by checking [facility])     Complex Physical Medicine & Rehab Issues Assess & Plan:   1. Severe abnormality of gait and mobility and impaired self-care and ADL's secondary to encephalopathy status post cerebrovascular disease and cardiac arrest..  Functional and medical status reassessed regarding patients ability to participate in therapies and patient found to be able to participate in  acute intensive comprehensive inpatient rehabilitation program including PT/OT to improve balance, ambulation, ADLs, and to improve the P/AROM. It is my opinion that they will be able to tolerate 3 hours of therapy a day and benefit from it at an acute level. 2. Bowel constipation and Bladder dysfunction overactive bladder:  frequent toileting, ambulate to bathroom with assistance, check post void residuals. Check for C.difficile x1 if >2 loose stools in 24 hours, continue bowel & bladder program.  Monitor for UTI symptoms including lethargy and confusion  3. Severe with her chronic mid and low back pain flared by recent cardiac arrest with resuscitation efforts and generalized OA pain: reassess pain every shift and prior to and after each therapy session, give prn  Tylenol, modalities prn in therapy, consider Lidoderm, K-pad prn.   4. Skin breakdown risk:  continue pressure relief program.  Daily skin exams and reports from nursing. 5. Severe fatigue due to immobility and nutritional deficits: Add vitamin B12 vitamin D and CoQ10 titrate dosing and add protein supplementation with low carb content. 6. Complex discharge planning:   Await clearance for acute rehab both medically and through [facility]. Complex Active General Medical Issues that complicate care Assess & Plan:     1.  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  Ye Agustin D.O., PM&R     Attending    [facility] Carmen Williamson

## 2020-10-15 NOTE — FLOWSHEET NOTE
2030- Patient received 1 unit PRBC. H&H lab drawn, and Hmg is improved at 8.6. She is sad but calm at this time and just wants to sleep. 0030- patient was moved to a bigger room with windows to help reduce confusion and anxiety. Had a large BM that was soft and formed and green/brown in color. 0600- Lab drawn and Hmg this am is 9.0.  Red and Gray lumen on PICC line will not flush, nor was there any blood return    Electronically signed by Beulah Hanson RN on 10/15/2020 at 6:17 AM\

## 2020-10-15 NOTE — PROGRESS NOTES
Progress Note  Patient: Wendall Nyhan  Unit/Bed: X621/H434-21  YOB: 1945  MRN: [id number]  Acct: [de-identified]   Admitting Diagnosis: Sepsis [facility]) [A41.9]  Date:  10/5/2020  Hospital Day: 9    Chief Complaint:  Abdominal pain    Subjective    [id number]: Awaiting pre-CERT for nursing home. Blood pressure is improved today. She denies any chest pain or shortness of breath. Renal function is abnormal but stable. GFR is 15. Nephrology following. Potassium is 3.5. Hemoglobin is stable at 8.5.  +4 L total net fluid balance. 10/14/20: Resting comfortably in bed in no acute distress. ,  Shortness breath, palpitations or dizziness. Appears weak. Hemodynamically stable. On telemetry maintaining sinus rhythm with heart rate in 70s with occasional PACs noted. Hypokalemic this morning with potassium low at 2.9 and potassium replaced this morning. Hemoglobin low at 7.2. Has SAÚL on CKD with creatinine of 3.3 BUN of 45 and GFR low at 11.7 this morning. Fluids/diuretic management per nephrology-patient currently on Lasix 40 mg IV twice daily. Per I's and O's, 7 L total positive fluid balance since admission but has had 4400 mL negative fluid balance in the past 24 hours    10/13/20: Resting comfortably now. No reported chest pain or shortness of breath. She continues to have diarrhea. Normal sinus rhythm on telemetry. Renal function improving. Blood pressure remains elevated. 191/68.    10/12/20: transfering to tele. Denies any CP or SOB. + diarrhea. HD stable. NSR on tele. 10/11/20: Patient is resting comfortably. No new events overnight. Normal sinus rhythm on telemetry. Potassium is 3.4. Hemoglobin is 8.3. Renal function worsening with a creatinine of 4.0 now. She is hemodynamically stable. 10/10/20: Patient is extubated and very alert today. Family is at the bedside. She denies any chest pain or shortness of breath. She is audibly wheezing.   She is in normal sinus rhythm on telemetry with heart rate into the 80s or 90s for. Not dependent on transvenous pacer. Transvenous pacer pulled at bedside    10/9/20: Remains on Arctic sun and intubated and sedated. She is independent of transvenous pacemaker heart rate is currently normal sinus rhythm in the 80s. Potassium is actually low and is being corrected. She is not on any pressors, blood pressure stable. 10/8/20: Patient is Arctic sun protocol still. She is intubated and sedated. She is currently off of IV pressors and her potassium has been corrected. Transvenous pacemaker in place and set at 45 bpm, currently she is in sinus bradycardia on telemetry heart rate in the 40s to 50s. Creatinine is 3.17. Potassium is 3.1. WBC is down to 11.    10/7/20: Patient is a 67 y.o. female who presents with a chief complaint of weakness. . Patient is followed on a regular basis by Dr. Edison Noriega MD.  Apparently patient had been expressing fevers and chills as well as weakness and presented to the emergency department, also complained of dysuria. Patient with history of diastolic congestive heart failure, chronic kidney disease, anemia, history of hypertrophic obstructive cardiomyopathy, gastric AVM. Status post cardiac catheterization in October 2019 that showed normal coronary arteries. Patient was admitted and being treated for urinary tract infection. Apparently she was noted to be hypotensive and bradycardic this morning and CODE BLUE was called. CPR was initiated and patient was given multiple rounds of epinephrine as well as atropine and CPR was initiated for approximate 20 minutes and ROSC was achieved. I was contacted by intensivist to evaluate patient for hypotension as well as bradycardia. Patient was thought to have an acute cardiac event. She was bradycardic in the 30s to 40s with blood pressure in the 70s to 80s on 2 pressors.     Review of Systems:   Review of Systems   Constitutional: Negative for activity change and fever. HENT: Negative for congestion. Respiratory: Negative for chest tightness and shortness of breath. Cardiovascular: Negative for chest pain, palpitations and leg swelling. Gastrointestinal: Negative for nausea and vomiting. Genitourinary: Sanchez catheter in place   Musculoskeletal: Negative for arthralgias. Skin: Negative for color change. Neurological: Positive for weakness (generalized). Negative for dizziness and syncope. Psychiatric/Behavioral: Negative for agitation and behavioral problems. Physical Examination:    BP (!) 125/46   Pulse 68   Temp 98.3 °F (36.8 °C) (Oral)   Resp 18   Ht 4' 11\" (1.499 m)   Wt 160 lb 7.9 oz (72.8 kg)   LMP  (LMP Unknown)   SpO2 100%   BMI 32.42 kg/m²    Physical Exam  Constitutional:       Appearance: Normal appearance. She is obese. HENT:      Head: Normocephalic and atraumatic. Neck:      Musculoskeletal: Normal range of motion and neck supple. Cardiovascular:      Rate and Rhythm: Normal rate and regular rhythm. Pulmonary:      Effort: Pulmonary effort is normal. No respiratory distress. Breath sounds: No wheezing, rhonchi or rales. Abdominal:      Palpations: Abdomen is soft. Comments: Obese abdomen   Musculoskeletal: Normal range of motion. Right lower leg: No edema. Left lower leg: No edema. Skin:     General: Skin is warm and dry. Neurological:      General: No focal deficit present. Mental Status: She is alert. Cranial Nerves: No cranial nerve deficit.    Psychiatric:         Mood and Affect: Mood normal.         Behavior: Behavior normal.         LABS:  CBC:   Lab Results   Component Value Date    WBC 16.1 10/15/2020    RBC 3.36 10/15/2020    HGB 8.5 10/15/2020    HCT 26.3 10/15/2020    MCV 80.9 10/15/2020    MCH 26.7 10/15/2020    MCHC 33.0 10/15/2020    RDW 22.0 10/15/2020     10/15/2020    MPV 8.8 08/01/2015     CBC with Differential:   Lab Results   Component Value Date    WBC 16.1 10/15/2020    RBC 3.36 10/15/2020    HGB 8.5 10/15/2020    HCT 26.3 10/15/2020     10/15/2020    MCV 80.9 10/15/2020    MCH 26.7 10/15/2020    MCHC 33.0 10/15/2020    RDW 22.0 10/15/2020    NRBC 1 08/28/2020    LYMPHOPCT 5.6 10/05/2020    MONOPCT 0.6 10/05/2020    BASOPCT 0.2 10/05/2020    MONOSABS 0.0 10/05/2020    LYMPHSABS 0.3 10/05/2020    EOSABS 0.1 10/05/2020    BASOSABS 0.0 10/05/2020     CMP:    Lab Results   Component Value Date     10/15/2020    K 3.5 10/15/2020    CL 99 10/15/2020    CO2 29 10/15/2020    BUN 39 10/15/2020    CREATININE 3.56 10/15/2020    GFRAA 15.1 10/15/2020    LABGLOM 12.4 10/15/2020    GLUCOSE 44 10/15/2020    PROT 4.7 10/13/2020    LABALBU 2.6 10/13/2020    CALCIUM 8.2 10/15/2020    BILITOT 0.4 10/13/2020    ALKPHOS 215 10/13/2020     10/13/2020     10/13/2020     BMP:    Lab Results   Component Value Date     10/15/2020    K 3.5 10/15/2020    CL 99 10/15/2020    CO2 29 10/15/2020    BUN 39 10/15/2020    LABALBU 2.6 10/13/2020    CREATININE 3.56 10/15/2020    CALCIUM 8.2 10/15/2020    GFRAA 15.1 10/15/2020    LABGLOM 12.4 10/15/2020    GLUCOSE 44 10/15/2020     Magnesium:    Lab Results   Component Value Date    MG 1.8 10/15/2020     Troponin:    Lab Results   Component Value Date    TROPONINI 0.041 10/05/2020       Radiology:  No results found. [facility] 10/7/20:  Procedure Summary  TVP placement  50-60% mid LAD stenosis, component of spasm improved with nitro. LVEDP=24mmhg. Recommendations  Aggressive risk factor management. Maximize medical therapy. consider PCI of mid LAD in future if symptomatic or abn stress test.   Angiographic Findings   Cardiac Arteries and Lesion Findings  LMCA: small caliber, normal.  LAD: small caliber, mid 50-60%. diffuse disease  LCx: small caliber, mild diffuse disease  RCA: small caliber, mild disease. Dominance: Left   VA  Ventriculography Findings  LVEDP is 24 mmHg.     Echocardiogram 10/7/20:    - Other allergy:(Darvon). Conclusions   Summary   Moderate annular calcification. Moderately dilated left atrium. Left ventricular ejection fraction is visually estimated at 55%. Moderate concentric left ventricular hypertrophy. Signature   ----------------------------------------------------------------   Electronically signed by Destiny Bray(Interpreting physician)   on 10/07/2020 02:22 PM   ----------------------------------------------------------------   Findings  Left Ventricle  Left ventricular ejection fraction is visually estimated at 55%. Moderate concentric left ventricular hypertrophy. Right Ventricle  Normal right ventricle structure and function. Normal right ventricle systolic pressure. Left Atrium  Moderately dilated left atrium. Right Atrium  Normal right atrium. Mitral Valve  Moderate annular calcification. Tricuspid Valve  Normal tricuspid valve structure and function. Aortic Valve  Normal aortic valve structure and function. Pulmonic Valve  Normal pulmonic valve structure and function. Pericardial Effusion  No evidence of pericardial effusion. Pleural Effusion  No evidence of pleural effusion. Aorta \ Miscellaneous  Miscellaneous normal findings were found. EKG 10/5/20: , ST depression with T wave inversion in leads II, III, aVF, I, aVL and V4-V6, QTc 425ms    Telemetry 10/14/20: SR 70s, occasional PVCs               ASSESSMENT:      Status post cardiopulmonary arrest   Urosepsis/septic shock   Sinus bradycardia/sick sinus syndrome status post temporary transvenous pacemaker secondary to hyperkalemia versus other- resolved. History of hypertrophic obstructive cardiomyopathy   History of moderate mid LAD CAD. Vasospasm noted and improved with nitro. Acute on chronic kidney injury  History of anemia   Normal LV function   Hx of HTN   HLP   Hypokalemia.         PLAN:   1. As always, aggressive risk factor modification is strongly recommended.  We should adhere to the JNC VIII guidelines for HTN management and the NCEPATP III guidelines for LDL-C management. 2. Cont with Coreg, verapamil, aldactone, Imdur, Catapress. consider Minoxidil if remains uncontrolled. 3. Avoid after load reducers such as ARB/ACE, hydralazine given HOCM  4. Avoid any nephrotoxic agents/nephrology recommendations. 5. GI/DVT PROPH  6. Discussed with IM.    7. Stable for transfer from cardiology standpoint.          Electronically signed by Herbert Tierney DO on 10/14/20 at 11:44 AM EDT

## 2020-10-15 NOTE — PROGRESS NOTES
Physical Therapy Med Surg Daily Treatment Note  Facility/Department: [address]  Room: [place] [address]       NAME: Kevin Banegas  : 1945 (3 y.o.)  MRN: [id number]  CODE STATUS: Full Code    Date of Service: 10/15/2020    Patient Diagnosis(es): Sepsis [facility]) [A41.9]   Chief Complaint   Patient presents with    Abdominal Pain     Patient Active Problem List    Diagnosis Date Noted    Cardiopulmonary arrest [facility])      Priority: High    Lumbosacral radiculopathy at L5 2015     Priority: High     Class: Acute    Spinal stenosis of lumbar region with neurogenic claudication 2015     Priority: High     Class: Chronic    High risk medication use-Norco - 17 OARRS PM&R, 18 OARRS PM&R, 18 OARRS PM&R, Urine Drug screen negative 17 PM&R--MED CONTRACT 2014     Priority: High    Gait abnormality 10/14/2020    Late effects of CVA (cerebrovascular accident) 10/14/2020    Sepsis ([address] 10/06/2020    Major depressive disorder in remission ([address] 10/06/2020    Gastroesophageal reflux disease without esophagitis 10/06/2020    Acute cystitis without hematuria 10/06/2020    CKD (chronic kidney disease) stage 4, GFR 15-29 ml/min ([address] 10/06/2020    Adenomatous polyp of sigmoid colon     Adenomatous polyp of transverse colon     Acute encephalopathy     Flash pulmonary edema ([address] 2020    Acute on chronic kidney failure ([address] 2020    Dysarthria     Chronic fatigue     Symptomatic anemia 2020    COPD exacerbation ([address] 2020    Anemia     AVM (arteriovenous malformation)     Gastrointestinal hemorrhage 2020    HOCM (hypertrophic obstructive cardiomyopathy) ([address] 2019    Hypoglycemia     SAÚL (acute kidney injury) ([address] 10/23/2019    Acute on chronic diastolic (congestive) heart failure ([address] 10/13/2019    Chronic obstructive pulmonary disease with acute exacerbation ([address] 10/12/2019    Hypertensive urgency 10/09/2019 Chronic obstructive pulmonary disease with acute exacerbation (HCC) 10/12/2019    Depression     Fibromyalgia     Hyperlipidemia     meds > 8 yrs    Hypertension     meds > 45 yrs    On home O2     2l per n/c at bedtime mostly,     Osteoarthritis     Type II diabetes mellitus, uncontrolled ([facility]     hx > 8 yrs    Unspecified sleep apnea      Past Surgical History:   Procedure Laterality Date    BACK SURGERY  2017    lumbar disc    CARDIAC CATHETERIZATION  11/3/14     DR. MIRELES / no stents    COLONOSCOPY  08/29/2016    w/polypectomy     COLONOSCOPY N/A 9/29/2020    COLONOSCOPY WITH POLYPECTOMY performed by Alec Farnsworth MD at [facility] N/A 10/7/2019    EUA HYSTEROSCOPY DILATATION AND CURETTAGE performed by Geraldine Olsen DO at [facility]. [address], COLON, DIAGNOSTIC      EYE SURGERY      [place] with IOL OU / [address] [place]  [address]    umbilical hernia repair    CO ESOPHAGOGASTRODUODENOSCOPY TRANSORAL DIAGNOSTIC N/A 3/24/2017    EGD ESOPHAGOGASTRODUODENOSCOPY performed by Kirit Saxena MD at [place] [address] 2/8/2018    negative findings    CO REVISE MEDIAN N/CARPAL TUNNEL SURG Left 6/5/2017    LEFT  CARPAL TUNNEL RELEASE performed by Linus Nick MD at [facility],7+V/W,LZGTL N/A 2/8/2018    TONSILLECTOMY      as child    UPPER GASTROINTESTINAL ENDOSCOPY  08/26/2016    w/bx     UPPER GASTROINTESTINAL ENDOSCOPY N/A 2/25/2020    EGD possible biopsy performed by Alec Farnsworth MD at [address] [place] [address] 7/1/2020    EGD PUSH ENTEROSCOPY performed by Lawrence Rees MD at [facility]          Restrictions  Restrictions/Precautions: Fall Risk    SUBJECTIVE   Subjective  Subjective: I want to get up and get in the chair.     Pre-Session Pain Report  Pre Treatment Pain Screening  Pain at present: 0  Intervention List: Patient able to continue with treatment          Post-Session Pain Report  Pain Assessment  Pain Level: 0         OBJECTIVE   Orientation  Overall Orientation Status: Within Functional Limits  Orientation Level: Oriented to place;Oriented to situation;Oriented to person    Bed mobility  Rolling to Left: Stand by assistance  Rolling to Right: Stand by assistance  Supine to Sit: Stand by assistance  Sit to Supine: Stand by assistance  Comment: bed flat with heavy use of hand rails; vc's for indep technique    Transfers  Sit to Stand: Stand by assistance  Stand to sit: Stand by assistance  Comment: vc's for hand placement and safety; Pt pulls up from  [employer]. Activity Tolerance  Activity Tolerance: Patient Tolerated treatment well          ASSESSMENT   Assessment: Pt motivated. Tx shortened by BM. Pt bed mobility improving. Will return for partial tx for ambulation. Discharge Recommendations:  Continue to assess pending progress    Goals  Long term goals  Long term goal 1: Bed mobility with indep  Long term goal 2: Functional transfers with indep  Long term goal 3: Amb 50ft with LRAD and indep  Long term goal 4: 4 steps with handrail and SBA to enter/exit home  Patient Goals   Patient goals : \"return home\"    PLAN    Safety Devices  Type of devices: Left in bed;Call light within reach; Bed alarm in place; All fall risk precautions in place     [facility] (6 CLICK) [address] Inpatient Mobility Raw Score : 15      Therapy Time   Individual   Time In 1030   Time Out 1045   Minutes 15      bm/Trsf - 15 mins       Jen Reece PTA, 10/15/20 at 10:49 AM       Definitions for assistance levels  Independent = pt does not require any physical supervision or assistance from another person for activity completion. Device may be needed.   Stand by assistance = pt requires verbal cues or instructions from another person, close to but not touching, to perform the activity  Minimal assistance= pt performs 75% or more of the activity; assistance is required to complete the activity  Moderate assistance= pt performs 50% of the activity; assistance is required to complete the activity  Maximal assistance = pt performs 25% of the activity; assistance is required to complete the activity  Dependent = pt requires total physical assistance to accomplish the task

## 2020-10-15 NOTE — CARE COORDINATION
Social work note: Tegan is pending for acute rehab at this time. Let AKASH Macdonald admissions from [facility] know that MILAN if following patient. Electronically signed by MILAN Asher on 10/15/2020 at 12:51 PM     1438: Let Dr. Shira Whitt know that precert is pending.  Electronically signed by MILAN Asher on 10/15/2020 at 2:42 PM

## 2020-10-15 NOTE — CARE COORDINATION
Spoke with Tatum Sawyer at [facility], precert pending, typical determination response time should be within 2 days. Will continue to follow.  Electronically signed by Symone Wright RN on 10/15/20 at 9:53 AM EDT

## 2020-10-16 LAB
ANION GAP SERPL CALCULATED.3IONS-SCNC: 9 MEQ/L (ref 9–15)
BUN BLDV-MCNC: 35 MG/DL (ref 8–23)
CALCIUM SERPL-MCNC: 8.4 MG/DL (ref 8.5–9.9)
CHLORIDE BLD-SCNC: 99 MEQ/L (ref 95–107)
CO2: 27 MEQ/L (ref 20–31)
CREAT SERPL-MCNC: 3.24 MG/DL (ref 0.5–0.9)
GFR AFRICAN AMERICAN: 16.8
GFR NON-AFRICAN AMERICAN: 13.9
GLUCOSE BLD-MCNC: 168 MG/DL (ref 60–115)
GLUCOSE BLD-MCNC: 186 MG/DL (ref 60–115)
GLUCOSE BLD-MCNC: 318 MG/DL (ref 60–115)
GLUCOSE BLD-MCNC: 65 MG/DL (ref 70–99)
GLUCOSE BLD-MCNC: 69 MG/DL (ref 60–115)
HCT VFR BLD CALC: 26.2 % (ref 37–47)
HEMOGLOBIN: 8.6 G/DL (ref 12–16)
MCH RBC QN AUTO: 26.9 PG (ref 27–31.3)
MCHC RBC AUTO-ENTMCNC: 32.8 % (ref 33–37)
MCV RBC AUTO: 82 FL (ref 82–100)
PDW BLD-RTO: 22 % (ref 11.5–14.5)
PERFORMED ON: ABNORMAL
PERFORMED ON: NORMAL
PLATELET # BLD: 185 K/UL (ref 130–400)
PLATELET SLIDE REVIEW: NORMAL
POTASSIUM REFLEX MAGNESIUM: 4.2 MEQ/L (ref 3.4–4.9)
RBC # BLD: 3.19 M/UL (ref 4.2–5.4)
SLIDE REVIEW: ABNORMAL
SODIUM BLD-SCNC: 135 MEQ/L (ref 135–144)
WBC # BLD: 15.5 K/UL (ref 4.8–10.8)

## 2020-10-16 PROCEDURE — 6370000000 HC RX 637 (ALT 250 FOR IP): Performed by: INTERNAL MEDICINE

## 2020-10-16 PROCEDURE — 99232 SBSQ HOSP IP/OBS MODERATE 35: CPT | Performed by: INTERNAL MEDICINE

## 2020-10-16 PROCEDURE — 97535 SELF CARE MNGMENT TRAINING: CPT

## 2020-10-16 PROCEDURE — 80048 BASIC METABOLIC PNL TOTAL CA: CPT

## 2020-10-16 PROCEDURE — 85027 COMPLETE CBC AUTOMATED: CPT

## 2020-10-16 PROCEDURE — 2580000003 HC RX 258: Performed by: INTERNAL MEDICINE

## 2020-10-16 PROCEDURE — 6360000002 HC RX W HCPCS: Performed by: INTERNAL MEDICINE

## 2020-10-16 PROCEDURE — C9113 INJ PANTOPRAZOLE SODIUM, VIA: HCPCS | Performed by: INTERNAL MEDICINE

## 2020-10-16 PROCEDURE — 99231 SBSQ HOSP IP/OBS SF/LOW 25: CPT | Performed by: NURSE PRACTITIONER

## 2020-10-16 PROCEDURE — 2060000000 HC ICU INTERMEDIATE R&B

## 2020-10-16 RX ORDER — INSULIN GLARGINE 100 [IU]/ML
30 INJECTION, SOLUTION SUBCUTANEOUS
Status: DISCONTINUED | OUTPATIENT
Start: 2020-10-17 | End: 2020-10-20 | Stop reason: HOSPADM

## 2020-10-16 RX ADMIN — CLONIDINE HYDROCHLORIDE 0.2 MG: 0.1 TABLET ORAL at 16:03

## 2020-10-16 RX ADMIN — FUROSEMIDE 40 MG: 10 INJECTION, SOLUTION INTRAMUSCULAR; INTRAVENOUS at 10:40

## 2020-10-16 RX ADMIN — Medication 10 ML: at 21:25

## 2020-10-16 RX ADMIN — ALTEPLASE 1 MG: 2.2 INJECTION, POWDER, LYOPHILIZED, FOR SOLUTION INTRAVENOUS at 02:58

## 2020-10-16 RX ADMIN — VERAPAMIL HYDROCHLORIDE 120 MG: 240 TABLET, FILM COATED, EXTENDED RELEASE ORAL at 21:24

## 2020-10-16 RX ADMIN — Medication 10 ML: at 10:42

## 2020-10-16 RX ADMIN — PANTOPRAZOLE SODIUM 40 MG: 40 INJECTION, POWDER, FOR SOLUTION INTRAVENOUS at 10:40

## 2020-10-16 RX ADMIN — Medication 10 ML: at 10:41

## 2020-10-16 RX ADMIN — CARVEDILOL 25 MG: 25 TABLET, FILM COATED ORAL at 18:07

## 2020-10-16 RX ADMIN — ISOSORBIDE MONONITRATE 60 MG: 60 TABLET, EXTENDED RELEASE ORAL at 10:41

## 2020-10-16 RX ADMIN — ROSUVASTATIN CALCIUM 40 MG: 40 TABLET, FILM COATED ORAL at 18:07

## 2020-10-16 RX ADMIN — INSULIN LISPRO 2 UNITS: 100 INJECTION, SOLUTION INTRAVENOUS; SUBCUTANEOUS at 18:10

## 2020-10-16 RX ADMIN — CLONIDINE HYDROCHLORIDE 0.2 MG: 0.1 TABLET ORAL at 21:24

## 2020-10-16 RX ADMIN — CLONIDINE HYDROCHLORIDE 0.2 MG: 0.1 TABLET ORAL at 10:41

## 2020-10-16 RX ADMIN — CARVEDILOL 25 MG: 25 TABLET, FILM COATED ORAL at 10:41

## 2020-10-16 RX ADMIN — SPIRONOLACTONE 25 MG: 25 TABLET, FILM COATED ORAL at 10:41

## 2020-10-16 ASSESSMENT — ENCOUNTER SYMPTOMS
SHORTNESS OF BREATH: 0
VOMITING: 0
CHEST TIGHTNESS: 0
NAUSEA: 0
COLOR CHANGE: 0

## 2020-10-16 ASSESSMENT — PAIN SCALES - GENERAL: PAINLEVEL_OUTOF10: 0

## 2020-10-16 NOTE — CARE COORDINATION
Social work note: AKASH Macdonald admissions from [address] [place] [address] updated LSW that precert is pending OT note. Electronically signed by MILAN Cobian on 10/16/2020 at 10:29 AM     1210: Updated patient regarding DC plan to Acute Rehab. Let her know Eduarda June was pending. She expressed being eager to go home. Reiterated that acute rehab will work with her on independence and eventually home. Electronically signed by MILAN Cobian on 10/16/2020 at 12:13 PM     5934: Precert pending. Confirmed with AKASH Macdonald admissions from [address] [place] [address]. Electronically signed by MIALN Cobian on 10/16/2020 at 3:30 PM     [id number]: AKASH Macdonald admissions from [address] [place] [address] reports case is with medical director at [facility]. They are open over the weekend. Therefore, precert is still pending. DUNG Cowart aware.  Electronically signed by MILAN Cobian on 10/16/2020 at 3:56 PM

## 2020-10-16 NOTE — PROGRESS NOTES
Hospitalist Daily Progress Note  Name: Jennifer Chowdhury  Age: 67 y.o. Gender: female  CodeStatus: Full Code  Allergies: Ibuprofen  Metformin And Related  Darvon [Propoxyphene Hcl]    Chief Complaint:Abdominal Pain    Primary Care Provider: Michael Moore MD  InpatientTreatment Team: Treatment Team: Attending Provider: Castillo Armijo DO; Utilization Reviewer: India Mosley RN; Consulting Physician: Christiana Licona DO; Consulting Physician: Chilo Paez MD; Consulting Physician: Everett Nobles MD; Consulting Physician: Misty Gallagher MD; Wound Care: Kong Sandoval, AKASH; : Gi Irizarry, AKASH; Registered Nurse: Sumaya Orosco, AKASH; : MILAN Alex Mc  Admission Date: 10/5/2020      Subjective: Patient seen and bedside. Hgb stable today. Again case discussed with GI. No bloody stools today, though gelatinous bloody stools noted yesterday. Afebrile. Vitals stable. Physical Exam  Constitutional:       Appearance: Normal appearance. She is not ill-appearing or diaphoretic. HENT:      Head: Normocephalic and atraumatic. Nose: Nose normal.      Mouth/Throat:      Mouth: Mucous membranes are moist.      Pharynx: Oropharynx is clear. Eyes:      Extraocular Movements: Extraocular movements intact. Pupils: Pupils are equal, round, and reactive to light. Cardiovascular:      Rate and Rhythm: Normal rate. Heart sounds: Murmur present. No friction rub. No gallop. Pulmonary:      Breath sounds: Wheezing and rales present. No rhonchi. Abdominal:      General: There is no distension. Tenderness: There is no abdominal tenderness. There is no guarding. Musculoskeletal: Normal range of motion. General: Swelling present. Neurological:      General: No focal deficit present. Mental Status: She is alert and oriented to person, place, and time. Psychiatric:         Mood and Affect: Mood normal.         Thought Content:  Thought content normal. Judgment: Judgment normal.         Review of Systems  14 point ROS is reviewed and negative  Medications:  Reviewed    Infusion Medications:    dextrose      dextrose       Scheduled Medications:    [START ON 10/17/2020] insulin glargine  30 Units Subcutaneous Lunch    furosemide  40 mg Intravenous Daily    [START ON 10/19/2020] epoetin dick-epbx  8,000 Units Subcutaneous Weekly    insulin lispro  6 Units Subcutaneous TID WC    cloNIDine  0.2 mg Oral TID    isosorbide mononitrate  60 mg Oral Daily    insulin lispro  0-12 Units Subcutaneous TID WC    insulin lispro  0-6 Units Subcutaneous Nightly    carvedilol  25 mg Oral BID WC    spironolactone  25 mg Oral Daily    sodium chloride  20 mL Intravenous Once    verapamil  120 mg Oral Nightly    sodium chloride flush  10 mL Intravenous 2 times per day    pantoprazole  40 mg Intravenous Daily    And    sodium chloride (PF)  10 mL Intravenous Daily    [Held by provider] PARoxetine  40 mg Oral QAM    rosuvastatin  40 mg Oral QPM     PRN Meds: hydrOXYzine, glucose, dextrose, glucagon (rDNA), dextrose, sodium chloride flush, lidocaine viscous hcl, magic (miracle) mouthwash, potassium chloride, sodium chloride flush, promethazine **OR** ondansetron, ondansetron, acetaminophen **OR** acetaminophen, glucose, dextrose, glucagon (rDNA), dextrose, iopamidol    Labs:   Recent Labs     10/14/20  0600  10/15/20  0533 10/15/20  1414 10/16/20  0600   WBC 13.5*  --  16.1*  --  15.5*   HGB 7.2*   < > 9.0* 8.5* 8.6*   HCT 21.9*   < > 27.2* 26.3* 26.2*     --  164  --  185    < > = values in this interval not displayed.      Recent Labs     10/14/20  0600 10/15/20  0533 10/16/20  0600    138 135   K 2.9* 3.5 4.2   CL 96 99 99   CO2 26 29 27   BUN 45* 39* 35*   CREATININE 3.75* 3.56* 3.24*   CALCIUM 8.1* 8.2* 8.4*     Recent Labs     10/13/20  1408   *   *   BILITOT 0.4   ALKPHOS 215*     No results for input(s): INR in the last 72 hours. No results for input(s): Janet Comment in the last 72 hours. Urinalysis:   Lab Results   Component Value Date    NITRU POSITIVE 10/05/2020    WBCUA >100 10/05/2020    BACTERIA MANY 10/05/2020    RBCUA 20-50 10/05/2020    BLOODU LARGE 10/05/2020    SPECGRAV 1.015 10/05/2020    GLUCOSEU >=1000 10/05/2020       Radiology:   Most recent    Chest CT      WITH CONTRAST:No results found for this or any previous visit. WITHOUT CONTRAST:   Results for orders placed during the hospital encounter of 08/24/20   CT CHEST WO CONTRAST    Narrative EXAMINATION:  CT SCAN THE CHEST    CLINICAL HISTORY:  Short of breath    COMPARISON:  March 24, 2019    TECHNIQUE:  Multiple serial axial images of the chest from the base the neck through the upper abdomen with both sagittal coronal reconstruction was performed without intravenous or oral administration of contrast.    FINDINGS:    There is a patchy mosaic appearance lung parenchyma that can be seen with small airways disease. There is bibasilar areas of atelectasis, scarring. No focal infiltrates. No effusions no pneumothoraces. No significant periaortic adenopathy. There is pretracheal adenopathy. There is multilevel degenerative changes with osteophytes of the thoracic spine. Impression Unremarkable CT scan the chest as described above      All CT scans at this facility use dose modulation, iterative reconstruction, and/or weight based dosing when appropriate to reduce radiation dose to as low as reasonably achievable. Examination: CT ABDOMEN PELVIS WO CONTRAST, CT CHEST WO CONTRAST    Indication:   flank pain     Technique: Multiple serial axial images was performed through the abdomen and pelvis without intravenous or oral administration of contrast..   Images were reconstructed in the axial and coronal and sagittal planes. Comparison: September 9, 2019    Findings:     The liver, gallbladder, spleen, pancreas, adrenals,  are unremarkable. The kidneys show no significant perinephric stranding. No nephrolithiasis. No hydronephrosis or hydroureter. No bladder calculi. Large and small bowel show no sign of obstruction. The appendix is not visualized. No pericecal stranding. No diverticulitis. No free air. No free fluid. The visualized abdominal aorta is of normal size and caliber. No significant retroperitoneal adenopathy. There is multilevel degenerative changes of lumbar spine. There is a grade 1 anterolisthesis of L4 and L5. There is narrowing of the L5-S1 disc space. Impression: UNREMARKABLE CT SCAN OF THE ABDOMEN AND PELVIS AS DESCRIBED ABOVE      All CT scans at this facility use dose modulation, iterative reconstruction, and/or weight based dosing when appropriate to reduce radiation dose to as low as reasonably achievable. CXR      2-view:   Results for orders placed during the hospital encounter of 08/24/20   XR CHEST (2 VW)    Narrative EXAMINATION: XR CHEST (2 VW)     CLINICAL HISTORY:  sob     COMPARISONS: None     FINDINGS:    Two views of the chest are submitted. The cardiac silhouette is enlarged  Pulmonary vascular congestion with increased interstitial markings. Right sided trachea. No focal infiltrates. No effusions. No Pneumothoraces. Impression PULMONARY VASCULAR CONGESTION WITH INCREASED INTERSTITIAL MARKINGS. RADIOGRAPHIC FINDINGS COULD SUGGEST EARLY CHF. CHF. CORRELATE CLINICALLY        Portable:   Results for orders placed during the hospital encounter of 10/05/20   XR CHEST PORTABLE    Narrative EXAMINATION: XR CHEST PORTABLE. DATE AND TIME:10/9/2020 5:00 AM     CLINICAL HISTORY: SHORTNESS OF BREATH      COMPARISONS: OCTOBER 8, 2020     FINDINGS: ET tube and nasogastric tube remain in satisfactory position. Persistent to increased basilar markings bilaterally with no significant change. out pt providers. In addition to examining and evaluating pt, I spent additional time explaining care, normaland abnormal findings, and treatment plan. All of pt questions were answered. Counseling, diet and education were provided. Case will be discussed with nursing staff when appropriate. Family will be updated if and whenappropriate.       Electronically signed by Adrianne Johnston DO on 10/16/2020 at 1:07 PM

## 2020-10-16 NOTE — PROGRESS NOTES
[facility] OCCUPATIONAL THERAPY MED SURG TREATMENT NOTE     Date: 10/16/2020  Patient Name: Shala Gao        MRN: [id number]  Account: [de-identified]   : 1945  (67 y.o.)  Room: [place] [address]    Chart Review:  Diagnosis:  The primary encounter diagnosis was Septicemia [facility]). Diagnoses of Acute cystitis without hematuria, Elevated troponin, Constipation, unspecified constipation type, Elevated lactic acid level, and Hyperglycemia were also pertinent to this visit. Restrictions:    Restrictions/Precautions  Restrictions/Precautions: Fall Risk, Contact Precautions(ESBL in urine)    Subjective:  Patient states: \"I want to go home. \"  Pain:  Start of tx:  Pre Treatment Pain Screening  Pain at present: 0  Scale Used: Numeric Score  Intervention List: Patient able to continue with treatment    End of tx:  Pain Assessment  Patient Currently in Pain: Denies  Pain Level: 0    Objective: Pt declined full ADL reporting that she was washed up this AM.     ADL:  ADL  Grooming: Contact guard assistance (Pt wash hands while standing at sink)  LE Dressing: Supervision (To doff/don socks while seated in bedside chair)  Toileting: Contact guard assistance    Toilet Transfers  Toilet - Technique: Ambulating  Equipment Used: Grab bars  Toilet Transfer: Contact guard assistance  Toilet Transfers Comments: guaman in place- no BM needed      Therapy key for assistance levels -   Independent = Pt. is able to perform task with no assistance but may require a device   Stand by assistance = Pt. does not perform task at an independent level but does not need physical assistance, requires verbal cues  Minimal, Moderate, Maximal Assistance = Pt. requires physical assistance (25%, 50%, 75% assist from helper) for task but is able to actively participate in task   Dependent = Pt. requires total assistance with task and is not able to actively participate with task completion    Functional Balance:  Balance  Sitting Balance: Supervision  Standing Balance: Contact guard assistance     Functional Mobility  Functional - Mobility Device: Rolling Walker  Activity: To/from bathroom  Assist Level: Contact guard assistance      Cognition:  Cognition  Cognition Comment: Patient had slightly slower processing of information    Treatment consisted of:  ADL Training    Assessment/Discharge Disposition: Pt fatigues with ambulation.    Performance deficits / Impairments: Decreased strength, Decreased endurance, Decreased high-level IADLs, Decreased ADL status, Decreased ROM, Decreased balance  Prognosis: Good  Discharge Recommendations: Continue to assess pending progress  History: moderate chart review  Exam: 7 areas of deficit  Assistance / Modification: moderate modification needed due to patient fatigue      6-Click  How much help for putting on and taking off regular lower body clothing?: A Little  How much help for Bathing?: A Little  How much help for Toileting?: A Little  How much help for putting on and taking off regular upper body clothing?: A Little  How much help for taking care of personal grooming?: A Little  How much help for eating meals?: None  AM-[facility] Inpatient Daily Activity Raw Score: 19  AM-PAC Inpatient ADL T-Scale Score : 40.22  ADL Inpatient CMS 0-100% Score: 42.8    Plan:  Continue OT per POC    Goals/Plan:  Improve Balance  Improve Strength  Improve Functional Transfers  Improve ADL [place]    Minutes:  OT Individual Minutes  Time In: 1010  Time Out: 1024  Minutes: 14    ADL trainin minutes    Electronically signed by:    Brionna Horan  10/16/2020, 10:50 AM

## 2020-10-16 NOTE — PROGRESS NOTES
chronic diastolic (congestive) heart failure (HCC)     SAÚL (acute kidney injury) (HCC)     Hypoglycemia     HOCM (hypertrophic obstructive cardiomyopathy) (HCC)     Gastrointestinal hemorrhage     COPD exacerbation (HCC)     Anemia     AVM (arteriovenous malformation)     Symptomatic anemia     Flash pulmonary edema (HCC)     Acute on chronic kidney failure (HCC)     Dysarthria     Chronic fatigue     Acute encephalopathy     Adenomatous polyp of sigmoid colon     Adenomatous polyp of transverse colon     Sepsis (HCC)     Major depressive disorder in remission ([facility] [address] [address]     Gastroesophageal reflux disease without esophagitis     Acute cystitis without hematuria     CKD (chronic kidney disease) stage 4, GFR 15-29 ml/min ([facility])     Cardiopulmonary arrest ([facility] [address]     Gait abnormality     Late effects of CVA (cerebrovascular accident)      Subjective:  Admit Date: 10/5/2020    Interval History: doing well worried about health    Medications:  Scheduled Meds:   furosemide  40 mg Intravenous Daily    [START ON 10/19/2020] epoetin dick-epbx  8,000 Units Subcutaneous Weekly    insulin glargine  35 Units Subcutaneous Nightly    insulin lispro  6 Units Subcutaneous TID WC    cloNIDine  0.2 mg Oral TID    isosorbide mononitrate  60 mg Oral Daily    insulin lispro  0-12 Units Subcutaneous TID WC    insulin lispro  0-6 Units Subcutaneous Nightly    carvedilol  25 mg Oral BID WC    spironolactone  25 mg Oral Daily    sodium chloride  20 mL Intravenous Once    verapamil  120 mg Oral Nightly    sodium chloride flush  10 mL Intravenous 2 times per day    pantoprazole  40 mg Intravenous Daily    And    sodium chloride (PF)  10 mL Intravenous Daily    [Held by provider] PARoxetine  40 mg Oral QAM    rosuvastatin  40 mg Oral QPM     Continuous Infusions:   dextrose      dextrose         CBC:   Recent Labs     10/15/20  0533 10/15/20  1414 10/16/20  0600   WBC 16.1*  --  15.5*   HGB 9.0* 8.5* 8.6*     --  185     CMP: Recent Labs     10/14/20  0600 10/15/20  0533 10/16/20  0600    138 135   K 2.9* 3.5 4.2   CL 96 99 99   CO2 26 29 27   BUN 45* 39* 35*   CREATININE 3.75* 3.56* 3.24*   GLUCOSE 221* 44* 65*   CALCIUM 8.1* 8.2* 8.4*   LABGLOM 11.7* 12.4* 13.9*     Troponin: No results for input(s): TROPONINI in the last 72 hours. BNP: No results for input(s): BNP in the last 72 hours. INR: No results for input(s): INR in the last 72 hours. Lipids: No results for input(s): CHOL, LDLDIRECT, TRIG, HDL, AMYLASE, LIPASE in the last 72 hours. Liver:   Recent Labs     10/13/20  1408   *   *   ALKPHOS 215*   PROT 4.7*   LABALBU 2.6*   BILITOT 0.4     Iron:  No results for input(s): IRONS, FERRITIN in the last 72 hours. Invalid input(s): LABIRONS  Urinalysis: No results for input(s): UA in the last 72 hours.     Objective:  Vitals: BP (!) 135/51   Pulse 72   Temp 98.3 °F (36.8 °C) (Oral)   Resp 18   Ht 4' 11\" (1.499 m)   Wt 153 lb 4.8 oz (69.5 kg)   LMP  (LMP Unknown)   SpO2 100%   BMI 30.96 kg/m²    Wt Readings from Last 3 Encounters:   10/16/20 153 lb 4.8 oz (69.5 kg)   09/29/20 130 lb (59 kg)   08/24/20 130 lb (59 kg)      24HR INTAKE/OUTPUT:      Intake/Output Summary (Last 24 hours) at 10/16/2020 1081  Last data filed at 10/15/2020 1802  Gross per 24 hour   Intake 580 ml   Output 1900 ml   Net -1320 ml       General: alert, in no apparent distress  HEENT: normocephalic, atraumatic, anicteric  Neck: supple, no mass  Lungs: non-labored respirations, clear to auscultation bilaterally  Heart: regular rate and rhythm, 1/6 systolic murmurs or rubs  Abdomen: soft, non-tender, non-distended  Ext: no cyanosis, no peripheral edema  Neuro: alert and oriented, no gross abnormalities  Psych: normal mood and affect  Skin: no rash      Electronically signed by Brea Hannon MD

## 2020-10-16 NOTE — PROGRESS NOTES
Subjective: The patient complains of severe  acute on chronic progressive weakness and confusion partially relieved by rest, PT, transfusion and exacerbated by prep of her H&H and need for transfusion. ROS x10: The patient also complains of severely impaired mobility and activities of daily living. Otherwise no new problems with vision, hearing, nose, mouth, throat, dermal, cardiovascular, GI, , pulmonary, musculoskeletal, psychiatric or neurological. See Rehab consult on Rehab chart . Vital signs:  BP (!) 155/57   Pulse 66   Temp 98 °F (36.7 °C) (Oral)   Resp 18   Ht 4' 11\" (1.499 m)   Wt 153 lb 4.8 oz (69.5 kg)   LMP  (LMP Unknown)   SpO2 100%   BMI 30.96 kg/m²   I/O:   PO/Intake:    fair PO intake, no problems observed or reported. Bowel/Bladder:  continent, no problems noted. General:  Patient is well developed, adequately nourished, non-obese and     well kempt. HEENT:    PERRLA, hearing intact to loud voice, external inspection of ear     and nose benign. Inspection of lips, tongue and gums benign  Musculoskeletal: No significant change in strength or tone. All joints stable. Inspection and palpation of digits and nails show no clubbing,       cyanosis or inflammatory conditions. Neuro/Psychiatric: Affect: flat but pleasant. Alert and oriented to self and     situation. No significant change in deep tendon reflexes or     sensation  Lungs:  Diminished, CTA-B. Respiration effort is normal at rest.     Heart:   S1 = S2,  RRR. No loud murmurs. Abdomen:  Soft, non-tender, no enlargement of liver or spleen. Extremities:  No significant lower extremity edema or tenderness. Skin:    BUE bruises dt blood draws, no visualized or palpated problems.     Rehabilitation:  Physical therapy: FIMS:  Bed Mobility: Scooting: Maximal assistance    Transfers: Sit to Stand: Stand by assistance  Stand to sit: Stand by assistance, Ambulation 1  Surface: level tile  Device: Rolling titrate dosing and add protein supplementation with low carb content. 6. Complex discharge planning:   Await clearance for acute rehab both medically and through [facility]. Complex Active General Medical Issues that complicate care Assess & Plan:     1. Principal Problem:    Sepsis ([facility]  Active Problems:    Lumbosacral radiculopathy at L5    Cardiopulmonary arrest (HCC)    Hypertension    Hyperlipidemia    Reactive depression    CHF (congestive heart failure) ([facility]    Uncontrolled type 2 diabetes mellitus with hyperglycemia (HCC)    COPD exacerbation ([facility])    Major depressive disorder in remission ([facility]    Gastroesophageal reflux disease without esophagitis    Acute cystitis without hematuria    CKD (chronic kidney disease) stage 4, GFR 15-29 ml/min ([facility])    Gait abnormality    Late effects of CVA (cerebrovascular accident)  Resolved Problems:    * No resolved hospital problems.  Jeanell Dick MD Dennie Nephew, D.O., PM&R     Attending    [address]

## 2020-10-16 NOTE — PROGRESS NOTES
Gastroenterology Progress Note    Kevin Banegas is a 67 y.o. female patient. Hospitalization Day:10    Chief C/O: anemia    SUBJECTIVE: Patient was seen and examined on the regular medical floor, she is hemodynamically stable, has been tolerating a diet, reportedly had 2 mucousy bloody stools yesterday, this seems to have now resolved. Hemoglobin is 8.6 after blood transfusion. ROS:  Gastrointestinal ROS: no abdominal pain, change in bowel habits, or black or bloody stools    Physical    VITALS:  BP (!) 149/54   Pulse 67   Temp 97.1 °F (36.2 °C) (Oral)   Resp 18   Ht 4' 11\" (1.499 m)   Wt 153 lb 4.8 oz (69.5 kg)   LMP  (LMP Unknown)   SpO2 100%   BMI 30.96 kg/m²   TEMPERATURE:  Current - Temp: 97.1 °F (36.2 °C); Max - Temp  Av.1 °F (36.2 °C)  Min: 97.1 °F (36.2 °C)  Max: 97.1 °F (36.2 °C)    General:  Alert and oriented,  No apparent distress  Skin- without jaundice  Eyes: anicteric sclera  Cardiac: RRR, Nl s1s2, without murmurs  Lungs CTA Bilaterally, normal effort  Abdomen soft, ND, NT, no HSM, Bowel sounds normal  Ext: without edema  Neuro: no asterixis     Data    Data Review:    Recent Labs     10/14/20  0600  10/15/20  0533 10/15/20  1414 10/16/20  06   WBC 13.5*  --  16.1*  --  15.5*   HGB 7.2*   < > 9.0* 8.5* 8.6*   HCT 21.9*   < > 27.2* 26.3* 26.2*   MCV 78.1*  --  80.9*  --  82.0     --  164  --  185    < > = values in this interval not displayed. Recent Labs     10/14/20  0600 10/15/20  0533 10/16/20  06    138 135   K 2.9* 3.5 4.2   CL 96 99 99   CO2 26 29 27   BUN 45* 39* 35*   CREATININE 3.75* 3.56* 3.24*     Recent Labs     10/13/20  1408   *   *   BILITOT 0.4   ALKPHOS 215*     No results for input(s): LIPASE, AMYLASE in the last 72 hours. No results for input(s): PROTIME, INR in the last 72 hours.       ASSESSMENT:  23-year-old -American female admitted with sepsis hospital course was complicated by cardiac arrest, now seen on the telemetry floor, alert and oriented, reportedly had 2 mucousy bloody stools yesterday, this is seems to have resolved has been tolerating diet, denies abdominal pain, hemoglobin is 8.6 after packed red blood cells patient is hemodynamically stable. Patient had prior recent EGD/push enteroscopy and colonoscopy with no evidence or source of iron deficiency anemia. No AVMs noted. Patient was planned to have small bowel capsule endoscopy as outpatient    PLAN :  1.  Continue supportive course of care, serial H&H and trend and transfuse accordingly  2. Ongoing evaluation for endoscopic investigation pending clinical course, discussed with primary team  3. Okay from a GI perspective for transfer to rehab    Thank you for allowing me to participate in the care of your patient.   Please feel free to contact the on-call gastroenterologist for any gastroenterology/hepatology concerns as of 10/16/20 @ 5 pm the on call physician is Dr Mary Mccoy, JOHNATHON - CNP

## 2020-10-16 NOTE — CARE COORDINATION
Spoke with Yadiel Marquez at [facility] ([phone]), case is being reviewed by medical director, determination still pending. No timeframe given for final determination, but per [facility] [place] is open on the weekend.  Electronically signed by Nadir Esteves RN on 10/16/20 at 3:41 PM EDT

## 2020-10-16 NOTE — PROGRESS NOTES
Progress Note  Patient: Celia Paul  Unit/Bed: Z235/J409-54  YOB: 1945  MRN: [id number]  Acct: [de-identified]   Admitting Diagnosis: Sepsis [facility]) [A41.9]  Date:  10/5/2020  Hospital Day: 10    Chief Complaint:  Abdominal pain    Subjective    10/16/2020: She is feeling good today. She denies any chest pain or shortness of breath. She would like to be transferred to rehab. Blood pressure is relatively stable at 155/57. He is compliant with her medications. Normal sinus rhythm on telemetry. No alarms. 35-33-31: Awaiting pre-CERT for nursing home. Blood pressure is improved today. She denies any chest pain or shortness of breath. Renal function is abnormal but stable. GFR is 15. Nephrology following. Potassium is 3.5. Hemoglobin is stable at 8.5.  +4 L total net fluid balance. 10/14/20: Resting comfortably in bed in no acute distress. ,  Shortness breath, palpitations or dizziness. Appears weak. Hemodynamically stable. On telemetry maintaining sinus rhythm with heart rate in 70s with occasional PACs noted. Hypokalemic this morning with potassium low at 2.9 and potassium replaced this morning. Hemoglobin low at 7.2. Has SAÚL on CKD with creatinine of 3.3 BUN of 45 and GFR low at 11.7 this morning. Fluids/diuretic management per nephrology-patient currently on Lasix 40 mg IV twice daily. Per I's and O's, 7 L total positive fluid balance since admission but has had 4400 mL negative fluid balance in the past 24 hours    10/13/20: Resting comfortably now. No reported chest pain or shortness of breath. She continues to have diarrhea. Normal sinus rhythm on telemetry. Renal function improving. Blood pressure remains elevated. 191/68.    10/12/20: transfering to tele. Denies any CP or SOB. + diarrhea. HD stable. NSR on tele. 10/11/20: Patient is resting comfortably. No new events overnight. Normal sinus rhythm on telemetry. Potassium is 3.4. Hemoglobin is 8.3.   Renal function worsening with a creatinine of 4.0 now. She is hemodynamically stable. 10/10/20: Patient is extubated and very alert today. Family is at the bedside. She denies any chest pain or shortness of breath. She is audibly wheezing. She is in normal sinus rhythm on telemetry with heart rate into the 80s or 90s for. Not dependent on transvenous pacer. Transvenous pacer pulled at bedside    10/9/20: Remains on [place] sun and intubated and sedated. She is independent of transvenous pacemaker heart rate is currently normal sinus rhythm in the 80s. Potassium is actually low and is being corrected. She is not on any pressors, blood pressure stable. 10/8/20: Patient is Arctic sun protocol still. She is intubated and sedated. She is currently off of IV pressors and her potassium has been corrected. Transvenous pacemaker in place and set at 45 bpm, currently she is in sinus bradycardia on telemetry heart rate in the 40s to 50s. Creatinine is 3.17. Potassium is 3.1. WBC is down to 11.    10/7/20: Patient is a 67 y.o. female who presents with a chief complaint of weakness. . Patient is followed on a regular basis by Dr. Geovanni Pandey MD.  Apparently patient had been expressing fevers and chills as well as weakness and presented to the emergency department, also complained of dysuria. Patient with history of diastolic congestive heart failure, chronic kidney disease, anemia, history of hypertrophic obstructive cardiomyopathy, gastric AVM. Status post cardiac catheterization in October 2019 that showed normal coronary arteries. Patient was admitted and being treated for urinary tract infection. Apparently she was noted to be hypotensive and bradycardic this morning and CODE BLUE was called. CPR was initiated and patient was given multiple rounds of epinephrine as well as atropine and CPR was initiated for approximate 20 minutes and ROSC was achieved.   I was contacted by intensivist to evaluate patient for hypotension as well as bradycardia. Patient was thought to have an acute cardiac event. She was bradycardic in the 30s to 40s with blood pressure in the 70s to 80s on 2 pressors. Review of Systems:   Review of Systems   Constitutional: Negative for activity change and fever. HENT: Negative for congestion. Respiratory: Negative for chest tightness and shortness of breath. Cardiovascular: Negative for chest pain, palpitations and leg swelling. Gastrointestinal: Negative for nausea and vomiting. Genitourinary: Sanchez catheter in place   Musculoskeletal: Negative for arthralgias. Skin: Negative for color change. Neurological: Positive for weakness (generalized). Negative for dizziness and syncope. Psychiatric/Behavioral: Negative for agitation and behavioral problems. Physical Examination:    BP (!) 155/57   Pulse 66   Temp 98 °F (36.7 °C) (Oral)   Resp 18   Ht 4' 11\" (1.499 m)   Wt 153 lb 4.8 oz (69.5 kg)   LMP  (LMP Unknown)   SpO2 100%   BMI 30.96 kg/m²    Physical Exam  Constitutional:       Appearance: Normal appearance. She is obese. HENT:      Head: Normocephalic and atraumatic. Neck:      Musculoskeletal: Normal range of motion and neck supple. Cardiovascular:      Rate and Rhythm: Normal rate and regular rhythm. Pulmonary:      Effort: Pulmonary effort is normal. No respiratory distress. Breath sounds: No wheezing, rhonchi or rales. Abdominal:      Palpations: Abdomen is soft. Comments: Obese abdomen   Musculoskeletal: Normal range of motion. Right lower leg: No edema. Left lower leg: No edema. Skin:     General: Skin is warm and dry. Neurological:      General: No focal deficit present. Mental Status: She is alert. Cranial Nerves: No cranial nerve deficit.    Psychiatric:         Mood and Affect: Mood normal.         Behavior: Behavior normal.         LABS:  CBC:   Lab Results   Component Value Date    WBC 15.5 10/16/2020    RBC 3.19 10/16/2020    HGB 8.6 10/16/2020    HCT 26.2 10/16/2020    MCV 82.0 10/16/2020    MCH 26.9 10/16/2020    MCHC 32.8 10/16/2020    RDW 22.0 10/16/2020     10/16/2020    MPV 8.8 08/01/2015     CBC with Differential:   Lab Results   Component Value Date    WBC 15.5 10/16/2020    RBC 3.19 10/16/2020    HGB 8.6 10/16/2020    HCT 26.2 10/16/2020     10/16/2020    MCV 82.0 10/16/2020    MCH 26.9 10/16/2020    MCHC 32.8 10/16/2020    RDW 22.0 10/16/2020    NRBC 1 08/28/2020    LYMPHOPCT 5.6 10/05/2020    MONOPCT 0.6 10/05/2020    BASOPCT 0.2 10/05/2020    MONOSABS 0.0 10/05/2020    LYMPHSABS 0.3 10/05/2020    EOSABS 0.1 10/05/2020    BASOSABS 0.0 10/05/2020     CMP:    Lab Results   Component Value Date     10/16/2020    K 4.2 10/16/2020    CL 99 10/16/2020    CO2 27 10/16/2020    BUN 35 10/16/2020    CREATININE 3.24 10/16/2020    GFRAA 16.8 10/16/2020    LABGLOM 13.9 10/16/2020    GLUCOSE 65 10/16/2020    PROT 4.7 10/13/2020    LABALBU 2.6 10/13/2020    CALCIUM 8.4 10/16/2020    BILITOT 0.4 10/13/2020    ALKPHOS 215 10/13/2020     10/13/2020     10/13/2020     BMP:    Lab Results   Component Value Date     10/16/2020    K 4.2 10/16/2020    CL 99 10/16/2020    CO2 27 10/16/2020    BUN 35 10/16/2020    LABALBU 2.6 10/13/2020    CREATININE 3.24 10/16/2020    CALCIUM 8.4 10/16/2020    GFRAA 16.8 10/16/2020    LABGLOM 13.9 10/16/2020    GLUCOSE 65 10/16/2020     Magnesium:    Lab Results   Component Value Date    MG 1.8 10/15/2020     Troponin:    Lab Results   Component Value Date    TROPONINI 0.041 10/05/2020       Radiology:  No results found. [facility] 10/7/20:  Procedure Summary  TVP placement  50-60% mid LAD stenosis, component of spasm improved with nitro. LVEDP=24mmhg. Recommendations  Aggressive risk factor management. Maximize medical therapy.   consider PCI of mid LAD in future if symptomatic or abn stress test.   Angiographic Findings   Cardiac Arteries and Lesion Findings  LMCA: small caliber, normal.  LAD: small caliber, mid 50-60%. diffuse disease  LCx: small caliber, mild diffuse disease  RCA: small caliber, mild disease. Dominance: Left   VA  Ventriculography Findings  LVEDP is 24 mmHg. Echocardiogram 10/7/20:    - Other allergy:(Dar[place]). Conclusions   Summary   Moderate annular calcification. Moderately dilated left atrium. Left ventricular ejection fraction is visually estimated at 55%. Moderate concentric left ventricular hypertrophy. Signature   ----------------------------------------------------------------   Electronically signed by Manju Bray(Interpreting physician)   on 10/07/2020 02:22 PM   ----------------------------------------------------------------   Findings  Left Ventricle  Left ventricular ejection fraction is visually estimated at 55%. Moderate concentric left ventricular hypertrophy. Right Ventricle  Normal right ventricle structure and function. Normal right ventricle systolic pressure. Left Atrium  Moderately dilated left atrium. Right Atrium  Normal right atrium. Mitral Valve  Moderate annular calcification. Tricuspid Valve  Normal tricuspid valve structure and function. Aortic Valve  Normal aortic valve structure and function. Pulmonic Valve  Normal pulmonic valve structure and function. Pericardial Effusion  No evidence of pericardial effusion. Pleural Effusion  No evidence of pleural effusion. Aorta \ Miscellaneous  Miscellaneous normal findings were found. EKG 10/5/20: , ST depression with T wave inversion in leads II, III, aVF, I, aVL and V4-V6, QTc 425ms    Telemetry 10/14/20: SR 70s, occasional PVCs               ASSESSMENT:      Status post cardiopulmonary arrest   Urosepsis/septic shock   Sinus bradycardia/sick sinus syndrome status post temporary transvenous pacemaker secondary to hyperkalemia versus other- resolved.    History of hypertrophic obstructive cardiomyopathy   History of moderate mid LAD CAD. Vasospasm noted and improved with nitro. Acute on chronic kidney injury  History of anemia   Normal LV function   Hx of HTN   HLP   Chronic kidney disease          PLAN:   1. As always, aggressive risk factor modification is strongly recommended. We should adhere to the JNC VIII guidelines for HTN management and the NCEPATP III guidelines for LDL-C management. 2. Cont with Coreg, verapamil, aldactone, Imdur, Catapress. consider Minoxidil if remains uncontrolled. 3. Avoid after load reducers such as ARB/ACE, hydralazine given HOCM  4. Avoid any nephrotoxic agents/nephrology recommendations. 5. GI/DVT PROPH  6. Stable for transfer from cardiology standpoint. Please call with questions.   Cardio to sign off.        Electronically signed by Kate Martines DO on 10/16/2020 at 3:51 PM

## 2020-10-16 NOTE — CARE COORDINATION
Call received from [facility] regarding precert, they are requesting updated OT notes for final decision.  Message sent to OT for eval. Electronically signed by Lynne Mejia RN on 10/16/20 at 7:43 AM EDT

## 2020-10-16 NOTE — PROGRESS NOTES
Progress Note  Date:10/15/2020       DRYX:M531/S403-14  Patient Teodora Pulido     YOB: 1945     Age:75 y.o. Chief complaint uncontrolled diabetes    Subjective    Subjective:  Symptoms:  Stable. She reports malaise, weakness and anorexia. Diet:  Poor intake. Activity level: Impaired due to weakness. Review of Systems   Gastrointestinal: Positive for anorexia. Neurological: Positive for weakness. All other systems reviewed and are negative. Objective         Vitals Last 24 Hours:  TEMPERATURE:  Temp  Av.3 °F (36.8 °C)  Min: 98.3 °F (36.8 °C)  Max: 98.3 °F (36.8 °C)  RESPIRATIONS RANGE: Resp  Av  Min: 18  Max: 18  PULSE OXIMETRY RANGE: SpO2  Av %  Min: 100 %  Max: 100 %  PULSE RANGE: Pulse  Av  Min: 68  Max: 72  BLOOD PRESSURE RANGE: Systolic (00AOZ), LCP:814 , Min:125 , IIA:348   ; Diastolic (05CPE), GKK:99, Min:46, Max:51    I/O (24Hr): Intake/Output Summary (Last 24 hours) at 10/15/2020 2158  Last data filed at 10/15/2020 1802  Gross per 24 hour   Intake 580 ml   Output 3400 ml   Net -2820 ml     Objective:  General Appearance:  Ill-appearing. Vital signs: (most recent): Blood pressure (!) 135/51, pulse 72, temperature 98.3 °F (36.8 °C), temperature source Oral, resp. rate 18, height 4' 11\" (1.499 m), weight 160 lb 7.9 oz (72.8 kg), SpO2 100 %, not currently breastfeeding. Vital signs are normal.    HEENT: Normal HEENT exam.    Lungs:  Normal effort and normal respiratory rate. Heart: Normal rate. Abdomen: Abdomen is soft. Extremities: Normal range of motion. There is dependent edema. Neurological: Patient is alert. Results for Lisseth Avery (MRN [id number]) as of 10/15/2020 21:58   Ref.  Range 10/15/2020 06:22 10/15/2020 07:16 10/15/2020 11:11 10/15/2020 16:36 10/15/2020 21:27   POC Glucose Latest Ref Range: 60 - 115 mg/dl 49 (L) 91 195 (H) 313 (H) 146 (H)       Labs/Imaging/Diagnostics    Labs:  CBC:  Recent Labs 10/13/20  1408  10/14/20  0600  10/14/20  2030 10/15/20  0533 10/15/20  1414   WBC 11.7*  --  13.5*  --   --  16.1*  --    RBC 2.85*  --  2.81*  --   --  3.36*  --    HGB 7.4*  7.3*   < > 7.2*   < > 8.6* 9.0* 8.5*   HCT 22.6*  22.6*   < > 21.9*   < > 26.6* 27.2* 26.3*   MCV 79.4*  --  78.1*  --   --  80.9*  --    RDW 21.6*  --  22.1*  --   --  22.0*  --      --  156  --   --  164  --     < > = values in this interval not displayed. CHEMISTRIES:  Recent Labs     10/13/20  1408 10/14/20  0600 10/15/20  0533   * 135 138   K 3.5  3.5 2.9* 3.5   CL 94* 96 99   CO2 27 26 29   BUN 47* 45* 39*   CREATININE 3.80* 3.75* 3.56*   GLUCOSE 243* 221* 44*   MG 2.1 1.9 1.8     PT/INR:No results for input(s): PROTIME, INR in the last 72 hours. APTT:No results for input(s): APTT in the last 72 hours. LIVER PROFILE:  Recent Labs     10/13/20  1408   *   *   BILITOT 0.4   ALKPHOS 215*       Imaging Last 24 Hours:  No results found. Assessment//Plan           Hospital Problems           Last Modified POA    * (Principal) Sepsis ([facility] 10/6/2020 Yes    Lumbosacral radiculopathy at L5 (Chronic) 10/14/2020 Yes    Overview Signed 3/16/2015  1:37 PM by Caterina Good DO     [address] IS PROJECTING THE  RIGHT AFFECTING THE L5 NERVE ROOT WITHIN ITS NEURAL FORAMEN AND PERHAPS  AFFECTING THE RIGHT S1 NERVE ROOT WITHIN THE SPINAL CANAL.          Cardiopulmonary arrest ([facility] 10/7/2020 Yes    Hypertension 10/6/2020 Yes    Hyperlipidemia 10/6/2020 Yes    Reactive depression 10/14/2020 Yes    CHF (congestive heart failure) ([facility] 10/6/2020 Yes    Uncontrolled type 2 diabetes mellitus with hyperglycemia ([facility] 10/6/2020 Yes    COPD exacerbation ([facility] 10/14/2020 Yes    Major depressive disorder in remission ([facility] 10/6/2020 Yes    Gastroesophageal reflux disease without esophagitis 10/6/2020 Yes    Acute cystitis without hematuria 10/6/2020 Yes    CKD (chronic kidney disease) stage 4, GFR 15-29 ml/min ([facility] 10/6/2020 Yes    Gait abnormality 10/14/2020 Yes    Late effects of CVA (cerebrovascular accident) 10/14/2020 Yes    Overview Signed 10/14/2020  1:08 PM by Amberly Joy DO      Remote left thalamic lacunar infarct. Assessment:    Condition: In stable condition. Improving. (Uncontrolled type 2 diabetes blood glucose levels have been fluctuating due to variable p.o. intake  Status post cardiac arrest  Chronic kidney disease  UTI  Depression  Lumbar radiculopathy). Plan:   Other transfer (see comment). (Adjust Lantus to 35 units at bedtime plus Humalog 6 units with each meals  Discharge planning transfer to possible skilled nursing facility  Reviewed pulmonary cardiology and nephrology follow-up).        Electronically signed by Francisco Smalls MD on 10/15/20 at 9:58 PM EDT

## 2020-10-17 LAB
ALBUMIN SERPL-MCNC: 2.7 G/DL (ref 3.5–4.6)
ALP BLD-CCNC: 202 U/L (ref 40–130)
ALT SERPL-CCNC: 150 U/L (ref 0–33)
ANION GAP SERPL CALCULATED.3IONS-SCNC: 11 MEQ/L (ref 9–15)
AST SERPL-CCNC: 53 U/L (ref 0–35)
BILIRUB SERPL-MCNC: <0.2 MG/DL (ref 0.2–0.7)
BUN BLDV-MCNC: 34 MG/DL (ref 8–23)
CALCIUM SERPL-MCNC: 8.7 MG/DL (ref 8.5–9.9)
CHLORIDE BLD-SCNC: 98 MEQ/L (ref 95–107)
CO2: 25 MEQ/L (ref 20–31)
CREAT SERPL-MCNC: 3.17 MG/DL (ref 0.5–0.9)
GFR AFRICAN AMERICAN: 17.2
GFR NON-AFRICAN AMERICAN: 14.2
GLOBULIN: 2.4 G/DL (ref 2.3–3.5)
GLUCOSE BLD-MCNC: 219 MG/DL (ref 60–115)
GLUCOSE BLD-MCNC: 295 MG/DL (ref 60–115)
GLUCOSE BLD-MCNC: 333 MG/DL (ref 70–99)
GLUCOSE BLD-MCNC: 337 MG/DL (ref 60–115)
GLUCOSE BLD-MCNC: 365 MG/DL (ref 60–115)
HCT VFR BLD CALC: 24.7 % (ref 37–47)
HEMOGLOBIN: 8.1 G/DL (ref 12–16)
IRON SATURATION: 17 % (ref 11–46)
IRON: 47 UG/DL (ref 37–145)
MCH RBC QN AUTO: 27.4 PG (ref 27–31.3)
MCHC RBC AUTO-ENTMCNC: 33 % (ref 33–37)
MCV RBC AUTO: 83.2 FL (ref 82–100)
PDW BLD-RTO: 22.2 % (ref 11.5–14.5)
PERFORMED ON: ABNORMAL
PLATELET # BLD: 231 K/UL (ref 130–400)
PLATELET SLIDE REVIEW: NORMAL
POTASSIUM REFLEX MAGNESIUM: 4.5 MEQ/L (ref 3.4–4.9)
POTASSIUM SERPL-SCNC: 4.5 MEQ/L (ref 3.4–4.9)
RBC # BLD: 2.97 M/UL (ref 4.2–5.4)
SODIUM BLD-SCNC: 134 MEQ/L (ref 135–144)
TOTAL IRON BINDING CAPACITY: 278 UG/DL (ref 178–450)
TOTAL PROTEIN: 5.1 G/DL (ref 6.3–8)
WBC # BLD: 14.5 K/UL (ref 4.8–10.8)

## 2020-10-17 PROCEDURE — 6370000000 HC RX 637 (ALT 250 FOR IP): Performed by: INTERNAL MEDICINE

## 2020-10-17 PROCEDURE — 6360000002 HC RX W HCPCS: Performed by: INTERNAL MEDICINE

## 2020-10-17 PROCEDURE — 83540 ASSAY OF IRON: CPT

## 2020-10-17 PROCEDURE — 85027 COMPLETE CBC AUTOMATED: CPT

## 2020-10-17 PROCEDURE — 94664 DEMO&/EVAL PT USE INHALER: CPT

## 2020-10-17 PROCEDURE — C9113 INJ PANTOPRAZOLE SODIUM, VIA: HCPCS | Performed by: INTERNAL MEDICINE

## 2020-10-17 PROCEDURE — 2580000003 HC RX 258: Performed by: INTERNAL MEDICINE

## 2020-10-17 PROCEDURE — 80053 COMPREHEN METABOLIC PANEL: CPT

## 2020-10-17 PROCEDURE — 2060000000 HC ICU INTERMEDIATE R&B

## 2020-10-17 PROCEDURE — 83550 IRON BINDING TEST: CPT

## 2020-10-17 PROCEDURE — 97535 SELF CARE MNGMENT TRAINING: CPT

## 2020-10-17 RX ORDER — GUAIFENESIN 600 MG/1
600 TABLET, EXTENDED RELEASE ORAL 2 TIMES DAILY
Status: DISCONTINUED | OUTPATIENT
Start: 2020-10-17 | End: 2020-10-20 | Stop reason: HOSPADM

## 2020-10-17 RX ORDER — IPRATROPIUM BROMIDE AND ALBUTEROL SULFATE 2.5; .5 MG/3ML; MG/3ML
1 SOLUTION RESPIRATORY (INHALATION) EVERY 4 HOURS PRN
Status: DISCONTINUED | OUTPATIENT
Start: 2020-10-17 | End: 2020-10-20 | Stop reason: HOSPADM

## 2020-10-17 RX ORDER — IPRATROPIUM BROMIDE AND ALBUTEROL SULFATE 2.5; .5 MG/3ML; MG/3ML
1 SOLUTION RESPIRATORY (INHALATION) EVERY 6 HOURS
Status: DISCONTINUED | OUTPATIENT
Start: 2020-10-17 | End: 2020-10-17

## 2020-10-17 RX ORDER — LANOLIN ALCOHOL/MO/W.PET/CERES
3 CREAM (GRAM) TOPICAL NIGHTLY PRN
Status: DISCONTINUED | OUTPATIENT
Start: 2020-10-17 | End: 2020-10-20 | Stop reason: HOSPADM

## 2020-10-17 RX ADMIN — GUAIFENESIN 600 MG: 600 TABLET ORAL at 12:05

## 2020-10-17 RX ADMIN — TORSEMIDE 50 MG: 10 TABLET ORAL at 12:05

## 2020-10-17 RX ADMIN — FUROSEMIDE 40 MG: 10 INJECTION, SOLUTION INTRAMUSCULAR; INTRAVENOUS at 08:37

## 2020-10-17 RX ADMIN — INSULIN LISPRO 10 UNITS: 100 INJECTION, SOLUTION INTRAVENOUS; SUBCUTANEOUS at 08:41

## 2020-10-17 RX ADMIN — SPIRONOLACTONE 25 MG: 25 TABLET, FILM COATED ORAL at 08:37

## 2020-10-17 RX ADMIN — INSULIN GLARGINE 30 UNITS: 100 INJECTION, SOLUTION SUBCUTANEOUS at 12:08

## 2020-10-17 RX ADMIN — Medication 3 MG: at 20:35

## 2020-10-17 RX ADMIN — Medication 10 ML: at 20:35

## 2020-10-17 RX ADMIN — CARVEDILOL 25 MG: 25 TABLET, FILM COATED ORAL at 08:37

## 2020-10-17 RX ADMIN — Medication 10 ML: at 08:41

## 2020-10-17 RX ADMIN — GUAIFENESIN 600 MG: 600 TABLET ORAL at 20:35

## 2020-10-17 RX ADMIN — INSULIN LISPRO 4 UNITS: 100 INJECTION, SOLUTION INTRAVENOUS; SUBCUTANEOUS at 16:49

## 2020-10-17 RX ADMIN — SODIUM CHLORIDE 200 MG: 9 INJECTION, SOLUTION INTRAVENOUS at 12:05

## 2020-10-17 RX ADMIN — CARVEDILOL 25 MG: 25 TABLET, FILM COATED ORAL at 15:52

## 2020-10-17 RX ADMIN — VERAPAMIL HYDROCHLORIDE 120 MG: 240 TABLET, FILM COATED, EXTENDED RELEASE ORAL at 20:35

## 2020-10-17 RX ADMIN — ISOSORBIDE MONONITRATE 60 MG: 60 TABLET, EXTENDED RELEASE ORAL at 08:37

## 2020-10-17 RX ADMIN — CLONIDINE HYDROCHLORIDE 0.2 MG: 0.1 TABLET ORAL at 15:52

## 2020-10-17 RX ADMIN — INSULIN LISPRO 8 UNITS: 100 INJECTION, SOLUTION INTRAVENOUS; SUBCUTANEOUS at 12:08

## 2020-10-17 RX ADMIN — CLONIDINE HYDROCHLORIDE 0.2 MG: 0.1 TABLET ORAL at 20:34

## 2020-10-17 RX ADMIN — CLONIDINE HYDROCHLORIDE 0.2 MG: 0.1 TABLET ORAL at 08:37

## 2020-10-17 RX ADMIN — ROSUVASTATIN CALCIUM 40 MG: 40 TABLET, FILM COATED ORAL at 20:35

## 2020-10-17 RX ADMIN — Medication 10 ML: at 08:40

## 2020-10-17 RX ADMIN — PANTOPRAZOLE SODIUM 40 MG: 40 INJECTION, POWDER, FOR SOLUTION INTRAVENOUS at 08:37

## 2020-10-17 ASSESSMENT — ENCOUNTER SYMPTOMS
CHEST TIGHTNESS: 0
VOMITING: 0
SHORTNESS OF BREATH: 0
APNEA: 0
EYES NEGATIVE: 1
NAUSEA: 0
ALLERGIC/IMMUNOLOGIC NEGATIVE: 1
COUGH: 1
DIARRHEA: 0
BLOOD IN STOOL: 0
WHEEZING: 0

## 2020-10-17 ASSESSMENT — PAIN SCALES - GENERAL
PAINLEVEL_OUTOF10: 0

## 2020-10-17 NOTE — PROGRESS NOTES
[facility] AT [facility] Respiratory Therapy Evaluation   Current Order:  duoneb q6      Home Regimen: none      Ordering Physician: alba  Re-evaluation Date:  --     Diagnosis: post cardiac arrest      Patient Status: Stable / Unstable + Physician notified    The following MDI Criteria must be met in order to convert aerosol to MDI with spacer. If unable to meet, MDI will be converted to aerosol:  []  Patient able to demonstrate the ability to use MDI effectively  []  Patient alert and cooperative  []  Patient able to take deep breath with 5-10 second hold  []  Medication(s) available in this delivery method   []  Peak flow greater than or equal to 200 ml/min            Current Order Substituted To  (same drug, same frequency)   Aerosol to MDI [] Albuterol Sulfate 0.083% unit dose by aerosol Albuterol Sulfate MDI 2 puffs by inhalation with spacer    [] Levalbuterol 1.25 mg unit dose by aerosol Levalbuterol MDI 2 puffs by inhalation with spacer    [] Levalbuterol 0.63 mg unit dose by aerosol Levalbuterol MDI 2 puffs by inhalation with spacer    [] Ipratropium Bromide 0.02% unit dose by aerosol Ipratropium Bromide MDI 2 puffs by inhalation with spacer    [] Duoneb (Ipratropium + Albuterol) unit dose by aerosol Ipratropium MDI + Albuterol MDI 2 puffs by inhalation w/spacer   MDI to Aerosol [] Albuterol Sulfate MDI Albuterol Sulfate 0.083% unit dose by aerosol    [] Levalbuterol MDI 2 puffs by inhalation Levalbuterol 1.25 mg unit dose by aerosol    [] Ipratropium Bromide MDI by inhalation Ipratropium Bromide 0.02% unit dose by aerosol    [] Combivent (Ipratropium + Albuterol) MDI by inhalation Duoneb (Ipratropium + Albuterol) unit dose by aerosol   Treatment Assessment [Frequency/Schedule]:  Change frequency to: _____duoneb q4 prn_____________________________________________per Protocol, P&T, MEC      Points 0 1 2 3 4   Pulmonary Status  Non-Smoker  []   Smoking history   < 20 pack years  []   Smoking history  ?  20 pack years  []

## 2020-10-17 NOTE — PROGRESS NOTES
Nephrology Progress Note    Assessment:  67y.o. year old female with history s/f T2DM, AMY, HTN, HLD, depression, fibromyalgia, chronic back pain, CHF, COPD and CKD stage III who presented for dysuria. Was being rx'd for UTI however pt suffered cardiac arrest (10/07). Has severe saúl that has slowly been improving     1. SAÚL on CKD: etiology unclear for initial worsening as not hypotensive, no contrast or nephrotoxic medications, likely however has contribution from cardiac arrest  2. CKD stage III: most likely in setting of T2DM/HTN, baseline Scr ~1.1-1.2 w/ eGFR mid/high 50s   3. Hyperkalemia 2/2 cardiac arrest, corrected w/ medical management  4. Cardiac arrest: CPR for ~1 hr then another ~15 mins  5. Bradycardia s/p pacemaker placement  6. Anemia with some contribution of kidneys. tsat 17%  7. Fluid overload on 40 mg iv lasix     Plan:  - on 8k retacrit weekly. Add a dose of iv iron  - on coreg, clonidine, aldactone verapamil for bp.   Hold off on ace/arb  - on lasix 40 iv daily - change to torsemide 50    Patient Active Problem List:     Hypertension     Hyperlipidemia     Uncontrolled type 2 diabetes mellitus with complication, without long-term current use of insulin (HCC)     Fibromyalgia     Anxiety     Smoker     Insomnia     DJD (degenerative joint disease), lumbar     Lumbosacral radiculopathy at S1     DDD (degenerative disc disease), lumbar     Dysphonia     CTS (carpal tunnel syndrome)     High risk medication use-Norco - 12/20/17 OARRS PM&R, 02/20/18 OARRS PM&R, 03/07/18 O[facility] PM&R, Urine Drug screen negative 02/06/17 PM&R--MED CONTRACT 2/6/17     SOB (shortness of breath) on exertion     Chest pain     Memory deficit     Artificial lens present     Presbyopia     Astigmatism, regular     Cataract, nuclear sclerotic senile     IDDM (insulin dependent diabetes mellitus)     Regular astigmatism     Nuclear senile cataract     Neck pain     Lumbosacral radiculopathy at L5     Spinal stenosis of lumbar region with neurogenic claudication     Impaired mobility and activities of daily living     Non-compliant patient NO showed FU 1/10/17 Dr Vesta Mcwilliams     Insomnia secondary to chronic pain     Reactive depression     Diabetic radiculopathy (HCC)     Cervical radicular pain     Diabetic asymmetric polyneuropathy ([facility])     Myalgia     Intercostal neuropathy     Osteoarthritis of spine with radiculopathy, lumbar region     Acute combined systolic and diastolic CHF, NYHA class 1 ([facility])     PMB (postmenopausal bleeding)     Acute on chronic diastolic heart failure (HCC)     CHF (congestive heart failure) ([facility])     Hypertensive urgency     Uncontrolled type 2 diabetes mellitus with hyperglycemia (HCC)     Chronic obstructive pulmonary disease with acute exacerbation (HCC)     Acute on chronic diastolic (congestive) heart failure (HCC)     SAÚL (acute kidney injury) ([facility]     Hypoglycemia     HOCM (hypertrophic obstructive cardiomyopathy) ([facility])     Gastrointestinal hemorrhage     COPD exacerbation ([facility])     Anemia     AVM (arteriovenous malformation)     Symptomatic anemia     Flash pulmonary edema ([facility])     Acute on chronic kidney failure ([facility])     Dysarthria     Chronic fatigue     Acute encephalopathy     Adenomatous polyp of sigmoid colon     Adenomatous polyp of transverse colon     Sepsis ([facility])     Major depressive disorder in remission ([address]     Gastroesophageal reflux disease without esophagitis     Acute cystitis without hematuria     CKD (chronic kidney disease) stage 4, GFR 15-29 ml/min ([facility])     Cardiopulmonary arrest ([address]      Subjective:  Admit Date: 10/5/2020    Interval History: renal function slowly improving, feels stronger. No fevers.   Swelling still there    Medications:  Scheduled Meds:   guaiFENesin  600 mg Oral BID    ipratropium-albuterol  1 ampule Inhalation Q6H    insulin glargine  30 Units Subcutaneous Lunch    furosemide  40 mg Intravenous Daily    [START ON 10/19/2020] epoetin dick-epbx  8,000 Units Subcutaneous Weekly    insulin lispro  6 Units Subcutaneous TID     cloNIDine  0.2 mg Oral TID    isosorbide mononitrate  60 mg Oral Daily    insulin lispro  0-12 Units Subcutaneous TID     insulin lispro  0-6 Units Subcutaneous Nightly    carvedilol  25 mg Oral BID WC    spironolactone  25 mg Oral Daily    sodium chloride  20 mL Intravenous Once    verapamil  120 mg Oral Nightly    sodium chloride flush  10 mL Intravenous 2 times per day    pantoprazole  40 mg Intravenous Daily    And    sodium chloride (PF)  10 mL Intravenous Daily    PARoxetine  40 mg Oral QAM    rosuvastatin  40 mg Oral QPM     Continuous Infusions:   dextrose      dextrose         CBC:   Recent Labs     10/16/20  0600 10/17/20  0600   WBC 15.5* 14.5*   HGB 8.6* 8.1*    231     CMP:    Recent Labs     10/15/20  0533 10/16/20  0600 10/17/20  0600    135 134*   K 3.5 4.2 4.5  4.5   CL 99 99 98   CO2 29 27 25   BUN 39* 35* 34*   CREATININE 3.56* 3.24* 3.17*   GLUCOSE 44* 65* 333*   CALCIUM 8.2* 8.4* 8.7   LABGLOM 12.4* 13.9* 14.2*     Troponin:   No results for input(s): TROPONINI in the last 72 hours. BNP: No results for input(s): BNP in the last 72 hours. INR:   No results for input(s): INR in the last 72 hours. Lipids:   No results for input(s): CHOL, LDLDIRECT, TRIG, HDL, AMYLASE, LIPASE in the last 72 hours. Liver:   Recent Labs     10/17/20  0600   AST 53*   *   ALKPHOS 202*   PROT 5.1*   LABALBU 2.7*   BILITOT <0.2     Iron:  No results for input(s): IRONS, FERRITIN in the last 72 hours. Invalid input(s): LABIRONS  Urinalysis: No results for input(s): UA in the last 72 hours.     Objective:  Vitals: BP (!) 154/49   Pulse 75   Temp 98.1 °F (36.7 °C) (Oral)   Resp 16   Ht 4' 11\" (1.499 m)   Wt 153 lb 4.8 oz (69.5 kg)   LMP  (LMP Unknown)   SpO2 100%   BMI 30.96 kg/m²    Wt Readings from Last 3 Encounters:   10/16/20 153 lb 4.8 oz (69.5 kg)   09/29/20 130 lb (59 kg)   08/24/20 130 lb (59 kg)      24HR INTAKE/OUTPUT:      Intake/Output Summary (Last 24 hours) at 10/17/2020 1015  Last data filed at 10/17/2020 0543  Gross per 24 hour   Intake 580 ml   Output 1000 ml   Net -420 ml       General: weak, no acute distress  HEENT: normocephalic, atraumatic,  Lungs: somewhat diminished  Heart: regular rate and rhythm, no murmurs or rubs  Abdomen: soft, non-tender, non-distended  Ext: 1+ edema  Neuro: answering all questions    Electronically signed by Arleen Collins MD

## 2020-10-17 NOTE — PROGRESS NOTES
Progress Note  Date:10/16/2020       XJDO:[id number]  Patient Megan Patel     YOB: 1945     Age:75 y.o. Chief complaint uncontrolled diabetes    Subjective    Subjective:  Symptoms:  Stable. She reports malaise and weakness. Diet:  Poor intake. Review of Systems   Cardiovascular: Negative. Neurological: Positive for weakness. All other systems reviewed and are negative. Objective         Vitals Last 24 Hours:  TEMPERATURE:  Temp  Av.8 °F (36.6 °C)  Min: 97.1 °F (36.2 °C)  Max: 98.2 °F (36.8 °C)  RESPIRATIONS RANGE: Resp  Av  Min: 18  Max: 18  PULSE OXIMETRY RANGE: SpO2  Av %  Min: 100 %  Max: 100 %  PULSE RANGE: Pulse  Av  Min: 64  Max: 83  BLOOD PRESSURE RANGE: Systolic (48SQQ), CBL:321 , Min:149 , LWB:126   ; Diastolic (47HTN), QPO:96, Min:50, Max:57    I/O (24Hr): Intake/Output Summary (Last 24 hours) at 10/16/2020 2155  Last data filed at 10/16/2020 2124  Gross per 24 hour   Intake 30 ml   Output 1000 ml   Net -970 ml     Objective:  General Appearance:  Ill-appearing. Vital signs: (most recent): Blood pressure (!) 156/50, pulse 83, temperature 98.2 °F (36.8 °C), temperature source Oral, resp. rate 18, height 4' 11\" (1.499 m), weight 153 lb 4.8 oz (69.5 kg), SpO2 100 %, not currently breastfeeding. Vital signs are normal.    HEENT: Normal HEENT exam.    Lungs:  Normal effort. Heart: Normal rate. Abdomen: Abdomen is soft. Extremities: There is dependent edema. Neurological: Patient is alert. Skin:  No rash. Labs/Imaging/Diagnostics    Labs:  CBC:  Recent Labs     10/14/20  0600  10/15/20  0533 10/15/20  1414 10/16/20  0600   WBC 13.5*  --  16.1*  --  15.5*   RBC 2.81*  --  3.36*  --  3.19*   HGB 7.2*   < > 9.0* 8.5* 8.6*   HCT 21.9*   < > 27.2* 26.3* 26.2*   MCV 78.1*  --  80.9*  --  82.0   RDW 22.1*  --  22.0*  --  22.0*     --  164  --  185    < > = values in this interval not displayed.      CHEMISTRIES:  Recent Labs     10/14/20  0600 10/15/20  0533 10/16/20  0600    138 135   K 2.9* 3.5 4.2   CL 96 99 99   CO2 26 29 27   BUN 45* 39* 35*   CREATININE 3.75* 3.56* 3.24*   GLUCOSE 221* 44* 65*   MG 1.9 1.8  --      Results for Malik Biswas (MRN [id number]) as of 10/16/2020 21:57   Ref. Range 10/16/2020 06:00 10/16/2020 06:34 10/16/2020 10:32 10/16/2020 15:39 10/16/2020 20:14   Sodium Latest Ref Range: 135 - 144 mEq/L 135       Potassium Latest Ref Range: 3.4 - 4.9 mEq/L 4.2       Chloride Latest Ref Range: 95 - 107 mEq/L 99       CO2 Latest Ref Range: 20 - 31 mEq/L 27       BUN Latest Ref Range: 8 - 23 mg/dL 35 (H)       Creatinine Latest Ref Range: 0.50 - 0.90 mg/dL 3.24 (H)       Anion Gap Latest Ref Range: 9 - 15 mEq/L 9       GFR Non- Latest Ref Range: >60  13.9 (L)       GFR  Latest Ref Range: >60  16.8 (L)       Glucose Latest Ref Range: 70 - 99 mg/dL 65 (L)       POC Glucose Latest Ref Range: 60 - 115 mg/dl  69 168 (H) 186 (H) 318 (H)   Calcium Latest Ref Range: 8.5 - 9.9 mg/dL 8.4 (L)           Imaging Last 24 Hours:  No results found. Assessment//Plan           Hospital Problems           Last Modified POA    * (Principal) Sepsis ([address] 10/6/2020 Yes    Lumbosacral radiculopathy at L5 (Chronic) 10/14/2020 Yes    Overview Signed 3/16/2015  1:37 PM by Michelle Lockwood,      [address] IS PROJECTING THE  RIGHT AFFECTING THE L5 NERVE ROOT WITHIN ITS NEURAL FORAMEN AND PERHAPS  AFFECTING THE RIGHT S1 NERVE ROOT WITHIN THE SPINAL CANAL.          Cardiopulmonary arrest ([address] 10/7/2020 Yes    Hypertension 10/6/2020 Yes    Hyperlipidemia 10/6/2020 Yes    Reactive depression 10/14/2020 Yes    CHF (congestive heart failure) ([address] 10/6/2020 Yes    Uncontrolled type 2 diabetes mellitus with hyperglycemia ([facility] 10/6/2020 Yes    COPD exacerbation ([facility] 10/14/2020 Yes    Major depressive disorder in remission ([facility] 10/6/2020 Yes    Gastroesophageal reflux disease without esophagitis 10/6/2020 Yes    Acute cystitis without hematuria 10/6/2020 Yes    CKD (chronic kidney disease) stage 4, GFR 15-29 ml/min ([facility]) 10/6/2020 Yes    Gait abnormality 10/14/2020 Yes    Late effects of CVA (cerebrovascular accident) 10/14/2020 Yes    Overview Signed 10/14/2020  1:08 PM by Venita Chris DO      Remote left thalamic lacunar infarct. Assessment:    Condition: In stable condition. Improving. (Uncontrolled diabetes with uncontrolled labile glucose fasting glucose have been lower  Status post cardiorespiratory arrest  Chronic kidney disease  UTI sepsis  Congestive heart failure COPD). Plan:   (Change Lantus to 30 units at lunchtime plus Humalog 6 units with each meals plus medium dose sliding scale  Continue the supportive measures patient evaluated for transfer to physical therapy rehab).        Electronically signed by Anthony Jordan MD on 10/16/20 at 9:55 PM EDT

## 2020-10-17 NOTE — CARE COORDINATION
RECEIVED CALL FROM [facility] DIRK MADERA  [facility]. CASE WILL NEED [facility] [place] [phone] OPT 5, SOMEONE IS THERE TODAY . IF NOT THIS MUST BE COMPLETED BY NOON Monday 10/19. INFO PERFECT SERVED TO DR Maryse Parker.

## 2020-10-17 NOTE — PROGRESS NOTES
Subjective: The patient complains of severe  acute on chronic progressive weakness and confusion partially relieved by rest, PT, transfusion and exacerbated by prep of her H&H and need for transfusion. Still with functional loss, would do well in acute rehab given waxing and weaning function. ROS x10: The patient also complains of severely impaired mobility and activities of daily living. Otherwise no new problems with vision, hearing, nose, mouth, throat, dermal, cardiovascular, GI, , pulmonary, musculoskeletal, psychiatric or neurological. See Rehab consult on Rehab chart . Vital signs:  BP (!) 154/49   Pulse 75   Temp 98.1 °F (36.7 °C) (Oral)   Resp 16   Ht 4' 11\" (1.499 m)   Wt 153 lb 4.8 oz (69.5 kg)   LMP  (LMP Unknown)   SpO2 100%   BMI 30.96 kg/m²   I/O:   PO/Intake:    fair PO intake, no problems observed or reported. Bowel/Bladder:  continent, no problems noted. General:  Patient is well developed, adequately nourished, non-obese and     well kempt. HEENT:    PERRLA, hearing intact to loud voice, external inspection of ear     and nose benign. Inspection of lips, tongue and gums benign  Musculoskeletal: No significant change in strength or tone. All joints stable. Inspection and palpation of digits and nails show no clubbing,       cyanosis or inflammatory conditions. Neuro/Psychiatric: Affect: flat but pleasant. Alert and oriented to self and     situation. No significant change in deep tendon reflexes or     sensation  Lungs:  Diminished, CTA-B. Respiration effort is normal at rest.     Heart:   S1 = S2,  RRR. No loud murmurs. Abdomen:  Soft, non-tender, no enlargement of liver or spleen. Extremities:  No significant lower extremity edema or tenderness. Skin:    BUE bruises dt blood draws, no visualized or palpated problems.     Rehabilitation:  Physical therapy: FIMS:  Bed Mobility: Scooting: Maximal assistance    Transfers: Sit to Stand: Stand by assistance  Stand to sit: Stand by assistance, Ambulation 1  Surface: level tile  Device: Rolling Walker  Assistance: Stand by assistance  Quality of Gait: FF posture, shortened step length and steady pace. vc's for safety with pacing in tight spaces. Gait Deviations: Decreased step length, Decreased step height  Distance: 20'x2  Comments: Poor posture, fwd flexed trunk, rapid fatigue.,      FIMS:  ,  , Assessment: Pt ambulated with WW with SBA. Pt slightly impulsive and quick paced in tight spaces. Cueing for safety with good follow through.     Occupational therapy: FIMS:   ,  ,      Speech therapy: FIMS:        Lab/X-ray studies reviewed, analyzed and discussed with patient and staff:   Recent Results (from the past 24 hour(s))   POCT Glucose    Collection Time: 10/16/20  3:39 PM   Result Value Ref Range    POC Glucose 186 (H) 60 - 115 mg/dl    Performed on ACCU-CHEK    POCT Glucose    Collection Time: 10/16/20  8:14 PM   Result Value Ref Range    POC Glucose 318 (H) 60 - 115 mg/dl    Performed on ACCU-CHEK    POCT Glucose    Collection Time: 10/17/20  5:43 AM   Result Value Ref Range    POC Glucose 365 (H) 60 - 115 mg/dl    Performed on ACCU-CHEK    CBC    Collection Time: 10/17/20  6:00 AM   Result Value Ref Range    WBC 14.5 (H) 4.8 - 10.8 K/uL    RBC 2.97 (L) 4.20 - 5.40 M/uL    Hemoglobin 8.1 (L) 12.0 - 16.0 g/dL    Hematocrit 24.7 (L) 37.0 - 47.0 %    MCV 83.2 82.0 - 100.0 fL    MCH 27.4 27.0 - 31.3 pg    MCHC 33.0 33.0 - 37.0 %    RDW 22.2 (H) 11.5 - 14.5 %    Platelets 964 160 - 624 K/uL    PLATELET SLIDE REVIEW Normal    Basic Metabolic Panel w/ Reflex to MG    Collection Time: 10/17/20  6:00 AM   Result Value Ref Range    Sodium 134 (L) 135 - 144 mEq/L    Potassium reflex Magnesium 4.5 3.4 - 4.9 mEq/L    Chloride 98 95 - 107 mEq/L    CO2 25 20 - 31 mEq/L    Anion Gap 11 9 - 15 mEq/L    Glucose 333 (H) 70 - 99 mg/dL    BUN 34 (H) 8 - 23 mg/dL    CREATININE 3.17 (H) 0.50 - 0.90 mg/dL    GFR Non-African American 14.2 (L) >60    GFR  17.2 (L) >60    Calcium 8.7 8.5 - 9.9 mg/dL   Iron and TIBC    Collection Time: 10/17/20  6:00 AM   Result Value Ref Range    Iron 47 37 - 145 ug/dL    TIBC 278 178 - 450 ug/dL    Iron Saturation 17 11 - 46 %   Comprehensive Metabolic Panel    Collection Time: 10/17/20  6:00 AM   Result Value Ref Range    Potassium 4.5 3.4 - 4.9 mEq/L    Total Protein 5.1 (L) 6.3 - 8.0 g/dL    Alb 2.7 (L) 3.5 - 4.6 g/dL    Total Bilirubin <0.2 0.2 - 0.7 mg/dL    Alkaline Phosphatase 202 (H) 40 - 130 U/L     (H) 0 - 33 U/L    AST 53 (H) 0 - 35 U/L    Globulin 2.4 2.3 - 3.5 g/dL   POCT Glucose    Collection Time: 10/17/20 11:30 AM   Result Value Ref Range    POC Glucose 337 (H) 60 - 115 mg/dl    Performed on ACCU-CHEK        Previous extensive, complex labs, notes and diagnostics reviewed and analyzed. ALLERGIES:    Allergies as of 10/05/2020 - In progress 10/05/2020   Allergen Reaction Noted    Ibuprofen Nausea Only 11/25/2014    Metformin and related  01/29/2014    Darvon [propoxyphene hcl] Nausea And Vomiting 09/07/2012      (please also verify by checking [facility])     Complex Physical Medicine & Rehab Issues Assess & Plan:   1. Severe abnormality of gait and mobility and impaired self-care and ADL's secondary to encephalopathy status post cerebrovascular disease and cardiac arrest..  Functional and medical status reassessed regarding patients ability to participate in therapies and patient found to be able to participate in  acute intensive comprehensive inpatient rehabilitation program including PT/OT to improve balance, ambulation, ADLs, and to improve the P/AROM. It is my opinion that they will be able to tolerate 3 hours of therapy a day and benefit from it at an acute level. 2. Bowel constipation and Bladder dysfunction overactive bladder:  frequent toileting, ambulate to bathroom with assistance, check post void residuals.   Check for C.difficile x1 if >2 loose stools in 24 hours, continue bowel & bladder program.  Monitor for UTI symptoms including lethargy and confusion  3. Severe with her chronic mid and low back pain flared by recent cardiac arrest with resuscitation efforts and generalized OA pain: reassess pain every shift and prior to and after each therapy session, give prn  Tylenol, modalities prn in therapy, consider Lidoderm, K-pad prn.   4. Skin breakdown risk:  continue pressure relief program.  Daily skin exams and reports from nursing. 5. Severe fatigue due to immobility and nutritional deficits: Add vitamin B12 vitamin D and CoQ10 titrate dosing and add protein supplementation with low carb content. 6. Complex discharge planning:   Await clearance for acute rehab both medically and through [facility]. Complex Active General Medical Issues that complicate care Assess & Plan:     1. Principal Problem:    Sepsis ([facility] [address]  Active Problems:    Lumbosacral radiculopathy at L5    Cardiopulmonary arrest (HCC)    Hypertension    Hyperlipidemia    Reactive depression    CHF (congestive heart failure) ([facility] [address]    Uncontrolled type 2 diabetes mellitus with hyperglycemia (HCC)    COPD exacerbation (HCC)    Major depressive disorder in remission ([facility] [address]    Gastroesophageal reflux disease without esophagitis    Acute cystitis without hematuria    CKD (chronic kidney disease) stage 4, GFR 15-29 ml/min (HCC)    Gait abnormality    Late effects of CVA (cerebrovascular accident)  Resolved Problems:    * No resolved hospital problems.  Fabiola Morgan D.O., PM&R     Attending    [facility] Carmen Williamson

## 2020-10-17 NOTE — PROGRESS NOTES
Physical Therapy Med Surg Daily Treatment Note  Facility/Department: [facility]  [facility]       NAME: Isma Hagen  : 1945 (10 y.o.)  MRN: [id number]  CODE STATUS: Full Code    Date of Service: 10/17/2020    Patient Diagnosis(es): Sepsis [facility]) [A41.9]   Chief Complaint   Patient presents with    Abdominal Pain     Patient Active Problem List    Diagnosis Date Noted    Cardiopulmonary arrest [facility])      Priority: High    Lumbosacral radiculopathy at L5 2015     Priority: High     Class: Acute    Spinal stenosis of lumbar region with neurogenic claudication 2015     Priority: High     Class: Chronic    High risk medication use-Norco - 17 OARRS PM&R, 18 OARRS PM&R, 18 OARRS PM&R, Urine Drug screen negative 17 PM&R--MED CONTRACT 2014     Priority: High    Gait abnormality 10/14/2020    Late effects of CVA (cerebrovascular accident) 10/14/2020    Sepsis ([address] 10/06/2020    Major depressive disorder in remission ([address] 10/06/2020    Gastroesophageal reflux disease without esophagitis 10/06/2020    Acute cystitis without hematuria 10/06/2020    CKD (chronic kidney disease) stage 4, GFR 15-29 ml/min ([address] 10/06/2020    Adenomatous polyp of sigmoid colon     Adenomatous polyp of transverse colon     Acute encephalopathy     Flash pulmonary edema ([address] 2020    Acute on chronic kidney failure ([address] 2020    Dysarthria     Chronic fatigue     Symptomatic anemia 2020    COPD exacerbation ([address] 2020    Anemia     AVM (arteriovenous malformation)     Gastrointestinal hemorrhage 2020    HOCM (hypertrophic obstructive cardiomyopathy) ([address] 2019    Hypoglycemia     SAÚL (acute kidney injury) ([address] 10/23/2019    Acute on chronic diastolic (congestive) heart failure ([address] 10/13/2019    Chronic obstructive pulmonary disease with acute exacerbation ([address] 10/12/2019    Hypertensive urgency 10/09/2019  Uncontrolled type 2 diabetes mellitus with hyperglycemia ([facility])     Acute on chronic diastolic heart failure ([address] 10/08/2019    CHF (congestive heart failure) ([facility])     PMB (postmenopausal bleeding)     Acute combined systolic and diastolic CHF, NYHA class 1 ([address] 03/24/2019    Osteoarthritis of spine with radiculopathy, lumbar region 11/07/2018    Myalgia 05/11/2018    Intercostal neuropathy 05/11/2018    Diabetic asymmetric polyneuropathy ([address] 04/11/2017    Cervical radicular pain 12/03/2016    Reactive depression 07/15/2016    Diabetic radiculopathy ([address] 07/15/2016    Insomnia secondary to chronic pain 04/15/2016    Non-compliant patient NO showed FU 1/10/17 Dr Analy Cassidy 09/24/2015    Impaired mobility and activities of daily living 03/28/2015    Neck pain 03/04/2015    Artificial lens present 10/03/2014    Presbyopia 10/03/2014    Astigmatism, regular 10/03/2014    Cataract, nuclear sclerotic senile 10/03/2014    IDDM (insulin dependent diabetes mellitus) 10/03/2014    Regular astigmatism 10/03/2014    Nuclear senile cataract 10/03/2014    Memory deficit 06/04/2014    SOB (shortness of breath) on exertion 03/14/2014    Chest pain 03/14/2014    CTS (carpal tunnel syndrome) 02/09/2014    DJD (degenerative joint disease), lumbar 02/08/2014    Lumbosacral radiculopathy at S1 02/08/2014    DDD (degenerative disc disease), lumbar 02/08/2014    Dysphonia 02/08/2014    Insomnia 12/04/2013    Smoker 06/18/2013    Hypertension     Hyperlipidemia     Uncontrolled type 2 diabetes mellitus with complication, without long-term current use of insulin ([facility])     Fibromyalgia     Anxiety         Past Medical History:   Diagnosis Date    Anxiety     Asthma     dx 2019 / has smoked since age 14    CHF (congestive heart failure) ([facility])     Chronic back pain     Bilateral L5 S1 Radic on emg--surprisingly worse on the left than the right--pt's symptoms and her MRI show worse on the right    Report  Pre Treatment Pain Screening  Pain at present: 0  Scale Used: Numeric Score  Intervention List: Patient able to continue with treatment  Pain Screening  Patient Currently in Pain: No       Post-Session Pain Report  Pain Assessment  Pain Assessment: 0-10  Pain Level: 0         OBJECTIVE        Bed mobility  Rolling to Left: Stand by assistance  Rolling to Right: Stand by assistance  Supine to Sit: Stand by assistance  Sit to Supine: Stand by assistance  Comment: vc's for improved technique, use of hand rails needed. Transfers  Sit to Stand: Stand by assistance  Stand to sit: Stand by assistance  Comment: vc's for hand placement and safety; Pt pulls up from [facility]. Ambulation 1  Surface: level tile  Device: Rolling Walker  Assistance: Stand by assistance  Quality of Gait: FF posture, shortened step length and steady pace. vc's for safety with pacing in tight spaces. Gait Deviations: Decreased step length;Decreased step height  Distance: 20'x2                                         Activity Tolerance  Activity Tolerance: Patient Tolerated treatment well          ASSESSMENT   Assessment: Pt ambulated with WW with SBA. Pt slightly impulsive and quick paced in tight spaces. Cueing for safety with good follow through.      Discharge Recommendations:  Continue to assess pending progress    Goals  Long term goals  Long term goal 1: Bed mobility with indep  Long term goal 2: Functional transfers with indep  Long term goal 3: Amb 50ft with LRAD and indep  Long term goal 4: 4 steps with handrail and SBA to enter/exit home  Patient Goals   Patient goals : \"return home\"    PLAN    Times per week: 3-6  Safety Devices  Type of devices: Left in bed, Call light within reach, Bed alarm in place, All fall risk precautions in place     AMPAC (6 CLICK) BASIC MOBILITY  AM-PAC Inpatient Mobility Raw Score : 15      Therapy Time   Individual   Time In 1511   Time Out 1526   Minutes 15      Gait: 5  BM/Trsf: 10        Efraín Donohue Sarah Hinson, PTA, 10/17/20 at 3:30 PM         Definitions for assistance levels  Independent = pt does not require any physical supervision or assistance from another person for activity completion. Device may be needed.   Stand by assistance = pt requires verbal cues or instructions from another person, close to but not touching, to perform the activity  Minimal assistance= pt performs 75% or more of the activity; assistance is required to complete the activity  Moderate assistance= pt performs 50% of the activity; assistance is required to complete the activity  Maximal assistance = pt performs 25% of the activity; assistance is required to complete the activity  Dependent = pt requires total physical assistance to accomplish the task

## 2020-10-17 NOTE — PROGRESS NOTES
**This is a Medical/ PA/ APRN Student Note and is charted for educational purposes. The non-physician staff attested note is not to be used for billing purposes or to guide patient care. Please see the physician modifications/ attestation for treatment plan/suggestions. This note has been reviewed and feedback has been provided to the student. *    Hospitalist Daily Progress Note  Name: Nithin Gonsalez  Age: 67 y.o. Gender: female  CodeStatus: Full Code  Allergies: Ibuprofen  Metformin And Related  Darvon [Propoxyphene Hcl]    Chief Complaint:Abdominal Pain    Primary Care Provider: Eli Doshi MD  InpatientTreatment Team: Treatment Team: Attending Provider: Tiffany Ruvalcaba DO; Utilization Reviewer: Yony Atkinson, AKASH; Consulting Physician: Kenneth Betancourt MD; Consulting Physician: Immanuel Flor MD; Consulting Physician: Craig Abraham MD; Wound Care: Andrew Perry RN; : Ezequiel Mercedes RN; Patient Care Tech: Wesley Patel; Registered Nurse: Alicia Mosley RN; : Sagrario Israel RN  Admission Date: 10/5/2020      Subjective:   Nithin Gonsalez is a 67 y.o., , female who has a past medical history of  has a past medical history of Anxiety, Asthma, CHF (congestive heart failure) ([address] Chronic back pain, Chronic obstructive pulmonary disease with acute exacerbation ([address] Depression, Fibromyalgia, Hyperlipidemia, Hypertension, On home O2, Osteoarthritis, Type II diabetes mellitus, uncontrolled ([address] and Unspecified sleep apnea that presents with sepsis secondary to urinary tract infection with ESBL in the urine and a WBC count of 14.5 today and trending downward. The patient was resting in her bed, and reports being lethargic today. Her daughter is at the bedside to provide additional in site. She states that her mother is typically lethargic at home when she is getting close to needing a blood transfusion, and they would bring her in to get one.   She also expressed concern for patient's expressed fear of being left alone. She says she is not normally like this. Yobani Moy did begin to cry on several occasions during the interview and repeatedly stated that she \"just wants to go home\". She denies any pain at this time. She did have a bowel movement that was not bloody today. She was able to stand and pivot to the commode. Patient was able to answer orientation questions correctly, but daughter feels that she is still confused and not at her baseline. Physical Exam  Vitals signs and nursing note reviewed. Exam conducted with a chaperone present. Constitutional:       General: She is sleeping. Appearance: She is well-groomed and overweight. HENT:      Head: Normocephalic and atraumatic. Nose: Nose normal.      Mouth/Throat:      Mouth: Mucous membranes are moist.      Pharynx: Oropharynx is clear. Cardiovascular:      Rate and Rhythm: Regular rhythm. Bradycardia present. Pulses:           Radial pulses are 2+ on the right side and 2+ on the left side. Dorsalis pedis pulses are 2+ on the right side and 2+ on the left side. Heart sounds: Murmur present. Pulmonary:      Effort: Pulmonary effort is normal.      Breath sounds: Normal air entry. Abdominal:      General: Abdomen is flat. Bowel sounds are normal.      Palpations: Abdomen is soft. Musculoskeletal: Normal range of motion. General: Swelling present. Feet:      Right foot:      Skin integrity: Skin integrity normal.      Toenail Condition: Right toenails are abnormally thick and long. Left foot:      Skin integrity: Skin integrity normal.      Toenail Condition: Left toenails are abnormally thick and long. Skin:     General: Skin is warm and dry. Capillary Refill: Capillary refill takes less than 2 seconds. Neurological:      Mental Status: She is oriented to person, place, and time. She is lethargic and confused.    Psychiatric: Mood and Affect: Mood is depressed. Affect is tearful. Speech: Speech is delayed. Behavior: Behavior is slowed. Behavior is cooperative. Thought Content: Thought content normal.         Cognition and Memory: Cognition is not impaired. Memory is not impaired. Judgment: Judgment normal.         Review of Systems   Constitutional: Positive for activity change, appetite change and fatigue. Negative for chills, diaphoresis and fever. HENT: Negative. Eyes: Negative. Respiratory: Positive for cough. Negative for apnea, chest tightness, shortness of breath and wheezing. Cardiovascular: Negative. Negative for chest pain, palpitations and leg swelling. Gastrointestinal: Negative for blood in stool, diarrhea, nausea and vomiting. Patient is newly incontinent   Endocrine: Negative. Genitourinary: Negative for difficulty urinating, dysuria and urgency. Musculoskeletal: Negative. Skin: Positive for wound. Allergic/Immunologic: Negative. Neurological: Positive for weakness. Negative for dizziness, tremors, syncope, light-headedness, numbness and headaches. Hematological: Negative. Psychiatric/Behavioral: Positive for confusion.        Medications:  Reviewed  Current Facility-Administered Medications   Medication Dose Route Frequency Provider Last Rate Last Dose    guaiFENesin (MUCINEX) extended release tablet 600 mg  600 mg Oral BID Jaiden Gilbert DO   600 mg at 10/17/20 1205    iron sucrose (VENOFER) 200 mg in sodium chloride 0.9 % 100 mL IVPB  200 mg Intravenous Once Jimmy Vasquez  mL/hr at 10/17/20 1205 200 mg at 10/17/20 1205    torsemide (DEMADEX) tablet 50 mg  50 mg Oral Daily Jimmy Vasquez MD   50 mg at 10/17/20 1205    ipratropium-albuterol (DUONEB) nebulizer solution 1 ampule  1 ampule Inhalation Q4H PRN Jaiden Gilbert DO        insulin glargine (LANTUS) injection vial 30 Units  30 Units Subcutaneous Lunch Lyndsey Beckford MD   30 Units at 10/17/20 1208    [START ON 10/19/2020] epoetin dick-epbx (RETACRIT) injection 8,000 Units  8,000 Units Subcutaneous Weekly Palak Axe Bescak, DO        insulin lispro (HUMALOG) injection vial 6 Units  6 Units Subcutaneous TID  Lyle Scott MD   6 Units at 10/17/20 1208    hydrOXYzine (ATARAX) tablet 25 mg  25 mg Oral TID PRN Michaelelle Forget Sedar, DO        glucose (GLUTOSE) 40 % oral gel 15 g  15 g Oral PRN Sydelle Forget Sedar, DO        dextrose 50 % IV solution  12.5 g Intravenous PRN Michaelelle Forget Sedar, DO        glucagon (rDNA) injection 1 mg  1 mg Intramuscular PRN Michaelelle Forget Sedar, DO        dextrose 5 % solution  100 mL/hr Intravenous PRN Michaelelle Forget Sedar, DO        sodium chloride flush 0.9 % injection 10 mL  10 mL Intravenous PRN Joann Forget Sedar, DO   10 mL at 10/13/20 1005    lidocaine viscous hcl (XYLOCAINE) 2 % solution 15 mL  15 mL Mouth/Throat Q3H PRN Michaelelle Forget Sedar, DO        cloNIDine (CATAPRES) tablet 0.2 mg  0.2 mg Oral TID Joann Forget Sedar, DO   0.2 mg at 10/17/20 0837    isosorbide mononitrate (IMDUR) extended release tablet 60 mg  60 mg Oral Daily Beni MONTERROSO Sedar, DO   60 mg at 10/17/20 0837    insulin lispro (HUMALOG) injection vial 0-12 Units  0-12 Units Subcutaneous TID  Lyle Scott MD   8 Units at 10/17/20 1208    insulin lispro (HUMALOG) injection vial 0-6 Units  0-6 Units Subcutaneous Nightly Lyle Scott MD   4 Units at 10/16/20 2127    carvedilol (COREG) tablet 25 mg  25 mg Oral BID  Palak Axe Bescak, DO   25 mg at 10/17/20 5969    spironolactone (ALDACTONE) tablet 25 mg  25 mg Oral Daily Palak Axe Bescak, DO   25 mg at 10/17/20 7385    magic (miracle) mouthwash  5 mL Swish & Spit 4x Daily PRN Napoleon Beal MD   5 mL at 10/15/20 1745    0.9 % sodium chloride bolus  20 mL Intravenous Once [facility] DO        verapamil (CALAN SR) extended release tablet 120 mg  120 mg Oral Nightly Lauri J Holiday, DO   120 mg at 10/16/20 2124    potassium chloride 20 mEq/50 mL IVPB (Central Line)  20 mEq Intravenous PRN Nathalie [place], DO 50 mL/hr at 10/12/20 1130 20 mEq at 10/12/20 1130    sodium chloride flush 0.9 % injection 10 mL  10 mL Intravenous 2 times per day Meeta Fowler MD   10 mL at 10/17/20 0841    sodium chloride flush 0.9 % injection 10 mL  10 mL Intravenous PRN Meeta Fowler MD        promethazine (PHENERGAN) tablet 12.5 mg  12.5 mg Oral Q6H PRN Meeta Fowler MD        Or    ondansetron (ZOFRAN) injection 4 mg  4 mg Intravenous Q6H PRN Meeta Fowler MD        pantoprazole (PROTONIX) injection 40 mg  40 mg Intravenous Daily Meeta Fowler MD   40 mg at 10/17/20 0837    And    sodium chloride (PF) 0.9 % injection 10 mL  10 mL Intravenous Daily Meeta Fowler MD   10 mL at 10/17/20 0840    ondansetron (ZOFRAN) injection 4 mg  4 mg Intravenous S9K PRN Ramsey Simpson, DO   4 mg at 85/56/58 1437    PARoxetine (PAXIL) tablet 40 mg  40 mg Oral QAM Kendallobir Tatiana, DO   Stopped at 10/07/20 0900    rosuvastatin (CRESTOR) tablet 40 mg  40 mg Oral QPM Ronobir Tatiana, DO   40 mg at 39/22/02 1807    acetaminophen (TYLENOL) tablet 650 mg  650 mg Oral D5U PRN Kendallobifred CostaTatiana, DO   914 mg at 10/14/20 2013    Or    acetaminophen (TYLENOL) suppository 650 mg  650 mg Rectal U1E PRN Kendallobir Tatiana, DO        glucose (GLUTOSE) 40 % oral gel 15 g  15 g Oral PRN Ronobir Tatiana, DO        dextrose 50 % IV solution  12.5 g Intravenous PRN Kendallobir Tatiana, DO   10.9 g at 10/09/20 0206    glucagon (rDNA) injection 1 mg  1 mg Intramuscular PRN Kendallobir Tatiana, DO        dextrose 5 % solution  100 mL/hr Intravenous PRN Kendallobir Tatiana, DO            Infusion Medications:    dextrose      dextrose       Scheduled Medications:    guaiFENesin  600 mg Oral BID    iron sucrose  200 mg Intravenous Once    torsemide  50 mg Oral Daily    insulin glargine  30 Units Subcutaneous Lunch    [START ON 10/19/2020] epoetin dick-epbx  8,000 Units Subcutaneous Weekly    insulin lispro  6 Units Subcutaneous TID WC    narrowing of the L5-S1 disc space. Impression: UNREMARKABLE CT SCAN OF THE ABDOMEN AND PELVIS AS DESCRIBED ABOVE      All CT scans at this facility use dose modulation, iterative reconstruction, and/or weight based dosing when appropriate to reduce radiation dose to as low as reasonably achievable. CXR      2-view:   Results for orders placed during the hospital encounter of 08/24/20   XR CHEST (2 VW)    Narrative EXAMINATION: XR CHEST (2 VW)     CLINICAL HISTORY:  sob     COMPARISONS: None     FINDINGS:    Two views of the chest are submitted. The cardiac silhouette is enlarged  Pulmonary vascular congestion with increased interstitial markings. Right sided trachea. No focal infiltrates. No effusions. No Pneumothoraces. Impression PULMONARY VASCULAR CONGESTION WITH INCREASED INTERSTITIAL MARKINGS. RADIOGRAPHIC FINDINGS COULD SUGGEST EARLY CHF. CHF. CORRELATE CLINICALLY        Portable:   Results for orders placed during the hospital encounter of 10/05/20   XR CHEST PORTABLE    Narrative EXAMINATION: XR CHEST PORTABLE. DATE AND TIME:10/9/2020 5:00 AM     CLINICAL HISTORY: SHORTNESS OF BREATH      COMPARISONS: OCTOBER 8, 2020     FINDINGS: ET tube and nasogastric tube remain in satisfactory position. Persistent to increased basilar markings bilaterally with no significant change. Impression NO CHANGE               Echo No results found for this or any previous visit.           Assessment/Plan:    Active Hospital Problems    Diagnosis Date Noted    Cardiopulmonary arrest [facility]) [I46.9]      Priority: High    Lumbosacral radiculopathy at L5 [M54.17] 03/16/2015     Priority: High     Class: Acute    Gait abnormality [R26.9] 10/14/2020    Late effects of CVA (cerebrovascular accident) [I69.90] 10/14/2020    Sepsis ([facility] [A41.9] 10/06/2020    Major depressive disorder in remission ([facility] [F32.5] 10/06/2020    progression. I am managing a portion of pt care. Some medical issues are handled byother specialists. Additional work up and treatment should be done in out pt setting by pt PCP and other out pt providers. In addition to examining and evaluating pt, I spent additional time explaining care, normaland abnormal findings, and treatment plan. All of pt questions were answered. Counseling, diet and education were provided. Case will be discussed with nursing staff when appropriate. Family will be updated if and whenappropriate. Electronically signed by Arash Abdul on 10/17/2020 at 12:31 PM   **This is a Medical/ PA/ APRN Student Note and is charted for educational purposes. The non-physician staff attested note is not to be used for billing purposes or to guide patient care. Please see the physician modifications/ attestation for treatment plan/suggestions. This note has been reviewed and feedback has been provided to the student.  *

## 2020-10-17 NOTE — CARE COORDINATION
Called [facility] this morning at 0930 to check status of Prior Authorizatin for Acute Inpatient Rehab. Received message, \"We are closed on Weekends. \" Left message for them to call with status. Received a call from Carine Gamboa at 56 from [employer] providing status on a patient previously admitted to [address] [place] [address]. Asked if she had the status on Mrs. Ricki Monsivais. Was told, \"Still Pending\". Asked if [address] was open on the weekend, and was not \"Not really\". Call [facility] Sr, and provided the latest status. Will continue to monitor.  Electronically signed by Farhana Hernadez RN on 10/17/20 at 10:50 AM EDT

## 2020-10-17 NOTE — CARE COORDINATION
PER DR YE RADER IS UPHELD FOR ACUTE REHAB. NO DISCHARGE, PT WILL NEED MONITORED IN THE HOSPITAL A FEW DAYS. ? SNF, WOULD NEED PRECERT.

## 2020-10-18 LAB
ANION GAP SERPL CALCULATED.3IONS-SCNC: 8 MEQ/L (ref 9–15)
BACTERIA: NEGATIVE /HPF
BILIRUBIN URINE: NEGATIVE
BLOOD, URINE: ABNORMAL
BUN BLDV-MCNC: 30 MG/DL (ref 8–23)
CALCIUM SERPL-MCNC: 8.8 MG/DL (ref 8.5–9.9)
CHLORIDE BLD-SCNC: 95 MEQ/L (ref 95–107)
CLARITY: CLEAR
CO2: 29 MEQ/L (ref 20–31)
COLOR: YELLOW
CREAT SERPL-MCNC: 2.99 MG/DL (ref 0.5–0.9)
EPITHELIAL CELLS, UA: ABNORMAL /HPF (ref 0–5)
GFR AFRICAN AMERICAN: 18.4
GFR NON-AFRICAN AMERICAN: 15.2
GLUCOSE BLD-MCNC: 113 MG/DL (ref 70–99)
GLUCOSE BLD-MCNC: 135 MG/DL (ref 60–115)
GLUCOSE BLD-MCNC: 144 MG/DL (ref 60–115)
GLUCOSE BLD-MCNC: 149 MG/DL (ref 60–115)
GLUCOSE BLD-MCNC: 235 MG/DL (ref 60–115)
GLUCOSE URINE: NEGATIVE MG/DL
HCT VFR BLD CALC: 24.7 % (ref 37–47)
HEMOGLOBIN: 8.2 G/DL (ref 12–16)
HYALINE CASTS: ABNORMAL /HPF (ref 0–5)
KETONES, URINE: NEGATIVE MG/DL
LEUKOCYTE ESTERASE, URINE: ABNORMAL
MCH RBC QN AUTO: 27.2 PG (ref 27–31.3)
MCHC RBC AUTO-ENTMCNC: 33 % (ref 33–37)
MCV RBC AUTO: 82.4 FL (ref 82–100)
NITRITE, URINE: NEGATIVE
PDW BLD-RTO: 22.3 % (ref 11.5–14.5)
PERFORMED ON: ABNORMAL
PH UA: 7 (ref 5–9)
PLATELET # BLD: 272 K/UL (ref 130–400)
POTASSIUM REFLEX MAGNESIUM: 4.2 MEQ/L (ref 3.4–4.9)
PROTEIN UA: 30 MG/DL
RBC # BLD: 3 M/UL (ref 4.2–5.4)
RBC UA: ABNORMAL /HPF (ref 0–5)
SODIUM BLD-SCNC: 132 MEQ/L (ref 135–144)
SPECIFIC GRAVITY UA: 1.01 (ref 1–1.03)
UROBILINOGEN, URINE: 0.2 E.U./DL
WBC # BLD: 12.3 K/UL (ref 4.8–10.8)
WBC UA: ABNORMAL /HPF (ref 0–5)

## 2020-10-18 PROCEDURE — 87186 SC STD MICRODIL/AGAR DIL: CPT

## 2020-10-18 PROCEDURE — 99232 SBSQ HOSP IP/OBS MODERATE 35: CPT | Performed by: PHYSICIAN ASSISTANT

## 2020-10-18 PROCEDURE — 6370000000 HC RX 637 (ALT 250 FOR IP): Performed by: INTERNAL MEDICINE

## 2020-10-18 PROCEDURE — C9113 INJ PANTOPRAZOLE SODIUM, VIA: HCPCS | Performed by: INTERNAL MEDICINE

## 2020-10-18 PROCEDURE — 2060000000 HC ICU INTERMEDIATE R&B

## 2020-10-18 PROCEDURE — 81001 URINALYSIS AUTO W/SCOPE: CPT

## 2020-10-18 PROCEDURE — 2580000003 HC RX 258: Performed by: INTERNAL MEDICINE

## 2020-10-18 PROCEDURE — 87077 CULTURE AEROBIC IDENTIFY: CPT

## 2020-10-18 PROCEDURE — 85027 COMPLETE CBC AUTOMATED: CPT

## 2020-10-18 PROCEDURE — 80048 BASIC METABOLIC PNL TOTAL CA: CPT

## 2020-10-18 PROCEDURE — 6360000002 HC RX W HCPCS: Performed by: INTERNAL MEDICINE

## 2020-10-18 PROCEDURE — 87086 URINE CULTURE/COLONY COUNT: CPT

## 2020-10-18 RX ORDER — DIPHENHYDRAMINE HCL 25 MG
25 TABLET ORAL NIGHTLY PRN
Status: DISCONTINUED | OUTPATIENT
Start: 2020-10-18 | End: 2020-10-20 | Stop reason: HOSPADM

## 2020-10-18 RX ADMIN — Medication 5 ML: at 16:18

## 2020-10-18 RX ADMIN — Medication 10 ML: at 08:42

## 2020-10-18 RX ADMIN — CLONIDINE HYDROCHLORIDE 0.2 MG: 0.1 TABLET ORAL at 14:40

## 2020-10-18 RX ADMIN — INSULIN GLARGINE 30 UNITS: 100 INJECTION, SOLUTION SUBCUTANEOUS at 12:04

## 2020-10-18 RX ADMIN — CARVEDILOL 25 MG: 25 TABLET, FILM COATED ORAL at 08:41

## 2020-10-18 RX ADMIN — ISOSORBIDE MONONITRATE 60 MG: 60 TABLET, EXTENDED RELEASE ORAL at 08:40

## 2020-10-18 RX ADMIN — VERAPAMIL HYDROCHLORIDE 120 MG: 240 TABLET, FILM COATED, EXTENDED RELEASE ORAL at 21:22

## 2020-10-18 RX ADMIN — ROSUVASTATIN CALCIUM 40 MG: 40 TABLET, FILM COATED ORAL at 21:22

## 2020-10-18 RX ADMIN — Medication 3 MG: at 21:22

## 2020-10-18 RX ADMIN — Medication 10 ML: at 21:29

## 2020-10-18 RX ADMIN — PAROXETINE HYDROCHLORIDE 40 MG: 20 TABLET, FILM COATED ORAL at 08:40

## 2020-10-18 RX ADMIN — CLONIDINE HYDROCHLORIDE 0.2 MG: 0.1 TABLET ORAL at 21:22

## 2020-10-18 RX ADMIN — GUAIFENESIN 600 MG: 600 TABLET ORAL at 21:22

## 2020-10-18 RX ADMIN — INSULIN LISPRO 2 UNITS: 100 INJECTION, SOLUTION INTRAVENOUS; SUBCUTANEOUS at 12:04

## 2020-10-18 RX ADMIN — INSULIN LISPRO 2 UNITS: 100 INJECTION, SOLUTION INTRAVENOUS; SUBCUTANEOUS at 08:47

## 2020-10-18 RX ADMIN — GUAIFENESIN 600 MG: 600 TABLET ORAL at 08:41

## 2020-10-18 RX ADMIN — Medication 10 ML: at 08:40

## 2020-10-18 RX ADMIN — CLONIDINE HYDROCHLORIDE 0.2 MG: 0.1 TABLET ORAL at 08:41

## 2020-10-18 RX ADMIN — TORSEMIDE 50 MG: 10 TABLET ORAL at 08:41

## 2020-10-18 RX ADMIN — PANTOPRAZOLE SODIUM 40 MG: 40 INJECTION, POWDER, FOR SOLUTION INTRAVENOUS at 08:40

## 2020-10-18 RX ADMIN — SPIRONOLACTONE 25 MG: 25 TABLET, FILM COATED ORAL at 08:41

## 2020-10-18 RX ADMIN — CARVEDILOL 25 MG: 25 TABLET, FILM COATED ORAL at 16:14

## 2020-10-18 ASSESSMENT — PAIN SCALES - GENERAL
PAINLEVEL_OUTOF10: 0

## 2020-10-18 ASSESSMENT — ENCOUNTER SYMPTOMS
EYES NEGATIVE: 1
CHEST TIGHTNESS: 0
APNEA: 0
BLOOD IN STOOL: 0
COUGH: 1
WHEEZING: 0
ALLERGIC/IMMUNOLOGIC NEGATIVE: 1
SHORTNESS OF BREATH: 0

## 2020-10-18 NOTE — PROGRESS NOTES
**This is a Medical/ PA/ APRN Student Note and is charted for educational purposes. The non-physician staff attested note is not to be used for billing purposes or to guide patient care. Please see the physician modifications/ attestation for treatment plan/suggestions. This note has been reviewed and feedback has been provided to the student. *    Hospitalist Daily Progress Note  Name: Kaylie Mosher  Age: 67 y.o. Gender: female  CodeStatus: Full Code  Allergies: Ibuprofen  Metformin And Related  Darvon [Propoxyphene Hcl]    Chief Complaint:Abdominal Pain    Primary Care Provider: Castillo Flores MD  InpatientTreatment Team: Treatment Team: Attending Provider: Lucy Benitez DO; Utilization Reviewer: Jan Barnes, RN; Consulting Physician: Jimmy Vasquez MD; Consulting Physician: Lawrence Rees MD; Consulting Physician: Lyndsey Beckford MD; Wound Care: Lauren Guaman RN; : Mohan Nguyen, RN; : Alexandria Erickson, RN; Registered Nurse: Meek Pimentel RN; Patient Care Tech: Moises Ervin  Admission Date: 10/5/2020      Subjective:   Kaylie Mosher is a 67 y.o., , female who has a past medical history of  has a past medical history of Anxiety, Asthma, CHF (congestive heart failure) ([address] Chronic back pain, Chronic obstructive pulmonary disease with acute exacerbation ([address] Depression, Fibromyalgia, Hyperlipidemia, Hypertension, On home O2, Osteoarthritis, Type II diabetes mellitus, uncontrolled ([address] and Unspecified sleep apnea that presents with sepsis secondary to urinary tract infection with ESBL in the urine and a WBC count of 14.5 today and trending downward. The patient was resting in her bed, and reports being lethargic today. Her daughter is at the bedside to provide additional in site. She states that her mother is typically lethargic at home when she is getting close to needing a blood transfusion, and they would bring her in to get one.   She also expressed concern for patient's expressed fear of being left alone. She says she is not normally like this. Maki Lombardi did begin to cry on several occasions during the interview and repeatedly stated that she \"just wants to go home\". She denies any pain at this time. She did have a bowel movement that was not bloody today. She was able to stand and pivot to the commode. Patient was able to answer orientation questions correctly, but daughter feels that she is still confused and not at her baseline. 10/18/2020  Patient examined at bedside today. She is more alert today when compared to yesterday. She is more conversational.  She listened to the conversation I had with her  and was actively participating. She denies any pain or discomfort at this time. She also reported that she is now aware of her bowel and bladder movements. She is afebrile. Spoke with  about home environment and he described it as her bedroom is on the first floor with a half bath across from it, she has access to the kitchen and other areas of the first level. The second level has the full bath and he plans for a chair lift to get her up there to bathe. He stated that his son is moving home to help care for her, and he is home now. She expressed that she wants to go home, but understands that she needs to be home when it is safe. BP (!) 153/59   Pulse 71   Temp 98.5 °F (36.9 °C) (Oral)   Resp 17   Ht 4' 11\" (1.499 m)   Wt 153 lb 4.8 oz (69.5 kg)   LMP  (LMP Unknown)   SpO2 100%   BMI 30.96 kg/m²        Physical Exam  Vitals signs and nursing note reviewed. Exam conducted with a chaperone present. Constitutional:       General: She is awake. Appearance: Normal appearance. She is well-groomed and overweight. HENT:      Head: Normocephalic and atraumatic. Nose: Nose normal.      Mouth/Throat:      Mouth: Mucous membranes are moist.      Pharynx: Oropharynx is clear.    Cardiovascular: Rate and Rhythm: Normal rate and regular rhythm. Pulses: Normal pulses. Radial pulses are 2+ on the right side and 2+ on the left side. Dorsalis pedis pulses are 2+ on the right side and 2+ on the left side. Heart sounds: Murmur present. Pulmonary:      Effort: Pulmonary effort is normal.      Breath sounds: Normal breath sounds and air entry. Abdominal:      General: Abdomen is flat. Bowel sounds are normal.      Palpations: Abdomen is soft. Musculoskeletal: Normal range of motion. Right lower leg: No edema. Left lower leg: No edema. Feet:      Right foot:      Skin integrity: Skin integrity normal.      Toenail Condition: Right toenails are abnormally thick and long. Left foot:      Skin integrity: Skin integrity normal.      Toenail Condition: Left toenails are abnormally thick and long. Skin:     General: Skin is warm and dry. Capillary Refill: Capillary refill takes less than 2 seconds. Neurological:      Mental Status: She is alert and oriented to person, place, and time. Psychiatric:         Mood and Affect: Mood is depressed. Affect is tearful. Speech: Speech is delayed. Behavior: Behavior is slowed. Behavior is cooperative. Thought Content: Thought content normal.         Cognition and Memory: Cognition is not impaired. Memory is not impaired. Judgment: Judgment normal.         Review of Systems   Constitutional: Positive for activity change. Negative for chills, diaphoresis and fever. HENT: Negative. Eyes: Negative. Respiratory: Positive for cough. Negative for apnea, chest tightness, shortness of breath and wheezing. Non-productive   Cardiovascular: Negative. Negative for chest pain, palpitations and leg swelling. Gastrointestinal: Negative for blood in stool. Endocrine: Negative. Genitourinary: Negative for difficulty urinating and urgency. Musculoskeletal: Negative.     Skin: Positive for wound. Allergic/Immunologic: Negative. Neurological: Positive for weakness. Negative for dizziness, tremors, syncope, light-headedness and numbness. Hematological: Negative.         Medications:  Reviewed  Current Facility-Administered Medications   Medication Dose Route Frequency Provider Last Rate Last Dose    guaiFENesin (MUCINEX) extended release tablet 600 mg  600 mg Oral BID Bhakti Rued Sedar, DO   600 mg at 10/18/20 0841    torsemide (DEMADEX) tablet 50 mg  50 mg Oral Daily Gama Cortez MD   50 mg at 10/18/20 0841    ipratropium-albuterol (DUONEB) nebulizer solution 1 ampule  1 ampule Inhalation Q4H PRN Bhakti Burr Sedar, DO        melatonin tablet 3 mg  3 mg Oral Nightly PRN Bhakti Burr Sedar, DO   3 mg at 10/17/20 2035    insulin glargine (LANTUS) injection vial 30 Units  30 Units Subcutaneous Lunch Jenny Miller MD   30 Units at 10/17/20 1208    [START ON 10/19/2020] epoetin dick-epbx (RETACRIT) injection 8,000 Units  8,000 Units Subcutaneous Weekly Brendan Calvin DO        insulin lispro (HUMALOG) injection vial 6 Units  6 Units Subcutaneous TID WC Lyle Scott MD   6 Units at 10/18/20 0849    hydrOXYzine (ATARAX) tablet 25 mg  25 mg Oral TID PRN Bhakti Burr Sedar, DO        glucose (GLUTOSE) 40 % oral gel 15 g  15 g Oral PRN Bhakti Burr Sedar, DO        dextrose 50 % IV solution  12.5 g Intravenous PRN Bhakti Burr Sedar, DO        glucagon (rDNA) injection 1 mg  1 mg Intramuscular PRN Bhakti Burr Sedar, DO        dextrose 5 % solution  100 mL/hr Intravenous PRN Bhakti Burr Sedar, DO        sodium chloride flush 0.9 % injection 10 mL  10 mL Intravenous PRN Bhakti Burr Sedar, DO   10 mL at 10/13/20 1005    lidocaine viscous hcl (XYLOCAINE) 2 % solution 15 mL  15 mL Mouth/Throat Q3H PRN Bhakti Rued Sedar, DO        cloNIDine (CATAPRES) tablet 0.2 mg  0.2 mg Oral TID Bhakti Javoned Sedar, DO   0.2 mg at 10/18/20 0841    isosorbide mononitrate (IMDUR) extended release tablet 60 mg  60 mg Oral Daily Beni Gilbert DO   60 mg at 10/18/20 0840    insulin lispro (HUMALOG) injection vial 0-12 Units  0-12 Units Subcutaneous TID  Lyle Scott MD   2 Units at 10/18/20 0847    insulin lispro (HUMALOG) injection vial 0-6 Units  0-6 Units Subcutaneous Nightly Lyle Scott MD   3 Units at 10/17/20 2100    carvedilol (COREG) tablet 25 mg  25 mg Oral BID  [facility] Gold Bescak, DO   25 mg at 10/18/20 9556    spironolactone (ALDACTONE) tablet 25 mg  25 mg Oral Daily [facility] Gold Bescak, DO   25 mg at 10/18/20 9764    magic (miracle) mouthwash  5 mL Swish & Spit 4x Daily PRN Lorin Yeh MD   5 mL at 10/15/20 1745    0.9 % sodium chloride bolus  20 mL Intravenous Once Frosty Ran, DO        verapamil (CALAN SR) extended release tablet 120 mg  120 mg Oral Nightly Lauri J Holiday, DO   120 mg at 10/17/20 2035    potassium chloride 20 mEq/50 mL IVPB (Central Line)  20 mEq Intravenous PRN Frostkaila Fernandez, DO 50 mL/hr at 10/12/20 1130 20 mEq at 10/12/20 1130    sodium chloride flush 0.9 % injection 10 mL  10 mL Intravenous 2 times per day Laura Chanel MD   10 mL at 10/18/20 0842    sodium chloride flush 0.9 % injection 10 mL  10 mL Intravenous PRN Laura Chanel MD        promethazine (PHENERGAN) tablet 12.5 mg  12.5 mg Oral Q6H PRN Laura Chanel MD        Or    ondansetron (ZOFRAN) injection 4 mg  4 mg Intravenous Q6H PRN Laura Chanel MD        pantoprazole (PROTONIX) injection 40 mg  40 mg Intravenous Daily Laura Chanel MD   40 mg at 10/18/20 0840    And    sodium chloride (PF) 0.9 % injection 10 mL  10 mL Intravenous Daily Laura Chanel MD   10 mL at 10/18/20 0840    ondansetron (ZOFRAN) injection 4 mg  4 mg Intravenous C5S PRN Ramsey Simpson, DO   4 mg at 94/86/98 1437    PARoxetine (PAXIL) tablet 40 mg  40 mg Oral QAM Kendallobifred Simpson, DO   40 mg at 06/17/81 0840    rosuvastatin (CRESTOR) tablet 40 mg  40 mg Oral QPM Ramsey Costaick, DO   40 mg at 53/41/25 2035    acetaminophen (TYLENOL) tablet 650 mg  650 mg Oral Q6H PRN Raulr Tatiana, DO   624 mg at 10/14/20 2013    Or    acetaminophen (TYLENOL) suppository 650 mg  650 mg Rectal L7H PRN Raulr Tatiana, DO        glucose (GLUTOSE) 40 % oral gel 15 g  15 g Oral PRN Raulr Tatiana, DO        dextrose 50 % IV solution  12.5 g Intravenous PRN Raulr Tatiana, DO   72.5 g at 10/09/20 0206    glucagon (rDNA) injection 1 mg  1 mg Intramuscular PRN Kendallobir Tatiana, DO        dextrose 5 % solution  100 mL/hr Intravenous PRN Raulr Tatiana, DO            Infusion Medications:    dextrose      dextrose       Scheduled Medications:    guaiFENesin  600 mg Oral BID    torsemide  50 mg Oral Daily    insulin glargine  30 Units Subcutaneous Lunch    [START ON 10/19/2020] epoetin dick-epbx  8,000 Units Subcutaneous Weekly    insulin lispro  6 Units Subcutaneous TID WC    cloNIDine  0.2 mg Oral TID    isosorbide mononitrate  60 mg Oral Daily    insulin lispro  0-12 Units Subcutaneous TID WC    insulin lispro  0-6 Units Subcutaneous Nightly    carvedilol  25 mg Oral BID WC    spironolactone  25 mg Oral Daily    sodium chloride  20 mL Intravenous Once    verapamil  120 mg Oral Nightly    sodium chloride flush  10 mL Intravenous 2 times per day    pantoprazole  40 mg Intravenous Daily    And    sodium chloride (PF)  10 mL Intravenous Daily    PARoxetine  40 mg Oral QAM    rosuvastatin  40 mg Oral QPM     PRN Meds: ipratropium-albuterol, melatonin, hydrOXYzine, glucose, dextrose, glucagon (rDNA), dextrose, sodium chloride flush, lidocaine viscous hcl, magic (miracle) mouthwash, potassium chloride, sodium chloride flush, promethazine **OR** ondansetron, ondansetron, acetaminophen **OR** acetaminophen, glucose, dextrose, glucagon (rDNA), dextrose    Labs:   Recent Labs     10/16/20  0600 10/17/20  0600 10/18/20  0554   WBC 15.5* 14.5* 12.3*   HGB 8.6* 8.1* 8.2*   HCT 26.2* 24.7* 24.7*    231 272     Recent Labs     10/16/20  0600 10/17/20  0600 10/18/20  0552    134* 132* K 4.2 4.5  4.5 4.2   CL 99 98 95   CO2 27 25 29   BUN 35* 34* 30*   CREATININE 3.24* 3.17* 2.99*   CALCIUM 8.4* 8.7 8.8     Recent Labs     10/17/20  0600   AST 53*   *   BILITOT <0.2   ALKPHOS 202*     No results for input(s): INR in the last 72 hours. No results for input(s): Jadene Moh in the last 72 hours. Urinalysis:   Lab Results   Component Value Date    NITRU POSITIVE 10/05/2020    WBCUA >100 10/05/2020    BACTERIA MANY 10/05/2020    RBCUA 20-50 10/05/2020    BLOODU LARGE 10/05/2020    SPECGRAV 1.015 10/05/2020    GLUCOSEU >=1000 10/05/2020       Radiology:   Most recent    Chest CT      WITH CONTRAST:No results found for this or any previous visit. WITHOUT CONTRAST:   Results for orders placed during the hospital encounter of 08/24/20   CT CHEST WO CONTRAST    Narrative EXAMINATION:  CT SCAN THE CHEST    CLINICAL HISTORY:  Short of breath    COMPARISON:  March 24, 2019    TECHNIQUE:  Multiple serial axial images of the chest from the base the neck through the upper abdomen with both sagittal coronal reconstruction was performed without intravenous or oral administration of contrast.    FINDINGS:    There is a patchy mosaic appearance lung parenchyma that can be seen with small airways disease. There is bibasilar areas of atelectasis, scarring. No focal infiltrates. No effusions no pneumothoraces. No significant periaortic adenopathy. There is pretracheal adenopathy. There is multilevel degenerative changes with osteophytes of the thoracic spine. Impression Unremarkable CT scan the chest as described above      All CT scans at this facility use dose modulation, iterative reconstruction, and/or weight based dosing when appropriate to reduce radiation dose to as low as reasonably achievable.     Examination: CT ABDOMEN PELVIS WO CONTRAST, CT CHEST WO CONTRAST    Indication:   flank pain     Technique: Multiple serial axial images was performed through the abdomen and pelvis without intravenous or oral administration of contrast..   Images were reconstructed in the axial and coronal and sagittal planes. Comparison: September 9, 2019    Findings: The liver, gallbladder, spleen, pancreas, adrenals,  are unremarkable. The kidneys show no significant perinephric stranding. No nephrolithiasis. No hydronephrosis or hydroureter. No bladder calculi. Large and small bowel show no sign of obstruction. The appendix is not visualized. No pericecal stranding. No diverticulitis. No free air. No free fluid. The visualized abdominal aorta is of normal size and caliber. No significant retroperitoneal adenopathy. There is multilevel degenerative changes of lumbar spine. There is a grade 1 anterolisthesis of L4 and L5. There is narrowing of the L5-S1 disc space. Impression: UNREMARKABLE CT SCAN OF THE ABDOMEN AND PELVIS AS DESCRIBED ABOVE      All CT scans at this facility use dose modulation, iterative reconstruction, and/or weight based dosing when appropriate to reduce radiation dose to as low as reasonably achievable. CXR      2-view:   Results for orders placed during the hospital encounter of 08/24/20   XR CHEST (2 VW)    Narrative EXAMINATION: XR CHEST (2 VW)     CLINICAL HISTORY:  sob     COMPARISONS: None     FINDINGS:    Two views of the chest are submitted. The cardiac silhouette is enlarged  Pulmonary vascular congestion with increased interstitial markings. Right sided trachea. No focal infiltrates. No effusions. No Pneumothoraces. Impression PULMONARY VASCULAR CONGESTION WITH INCREASED INTERSTITIAL MARKINGS. RADIOGRAPHIC FINDINGS COULD SUGGEST EARLY CHF. CHF. CORRELATE CLINICALLY        Portable:   Results for orders placed during the hospital encounter of 10/05/20   XR CHEST PORTABLE    Narrative EXAMINATION: XR CHEST PORTABLE.      DATE AND TIME:10/9/2020 5:00 AM CLINICAL HISTORY: SHORTNESS OF BREATH      COMPARISONS: OCTOBER 8, 2020     FINDINGS: ET tube and nasogastric tube remain in satisfactory position. Persistent to increased basilar markings bilaterally with no significant change. Impression NO CHANGE               Echo No results found for this or any previous visit.           Assessment/Plan:    Active Hospital Problems    Diagnosis Date Noted    Cardiopulmonary arrest [facility]) [I46.9]      Priority: High    Lumbosacral radiculopathy at L5 [M54.17] 03/16/2015     Priority: High     Class: Acute    Gait abnormality [R26.9] 10/14/2020    Late effects of CVA (cerebrovascular accident) [I69.90] 10/14/2020    Sepsis ([address] [A41.9] 10/06/2020    Major depressive disorder in remission ([address] [F32.5] 10/06/2020    Gastroesophageal reflux disease without esophagitis [K21.9] 10/06/2020    Acute cystitis without hematuria [N30.00] 10/06/2020    CKD (chronic kidney disease) stage 4, GFR 15-29 ml/min ([address] [N18.4] 10/06/2020    COPD exacerbation ([address] [J44.1] 06/29/2020    Uncontrolled type 2 diabetes mellitus with hyperglycemia (HCC) [E11.65]     CHF (congestive heart failure) ([address] [I50.9]     Reactive depression [F32.9] 07/15/2016    Hypertension [I10]     Hyperlipidemia [E78.5]        CKD Stage 4   Monitor with daily BMP   Avoid nephrotoxic medications   Monitor intake and output   Nephrology consulted and following  Hypertension - /59   Coreg 25 mg PO daily with meals   Catapres 0.2 mg PO TID   Aldactone 25 mg PO daily   Demadex 50 mg PO daily   Calan  mg PO QHS   Cardiology consulted and following  DMII - blood glucose - 144  patient is eating a lot of fruit   lantus 30 units SC daily with lunch   humalog medium dose correction sliding scale coverage TID with meals   humalog medium dose corrective sliding scale at HS   6 units SC TID with meals   Consult to endocrinology  Hypertrophic cardiomyopathy - EF 50%   Cardiology consulted  Sepsis

## 2020-10-18 NOTE — PROGRESS NOTES
Subjective: The patient complains of severe  acute on chronic progressive weakness and confusion partially relieved by rest, PT, transfusion and exacerbated by prep of her H&H and need for transfusion. Still with functional loss, would do well in acute rehab given waxing and weaning function. ROS x10: The patient also complains of severely impaired mobility and activities of daily living. Otherwise no new problems with vision, hearing, nose, mouth, throat, dermal, cardiovascular, GI, , pulmonary, musculoskeletal, psychiatric or neurological. See Rehab consult on Rehab chart . Vital signs:  BP (!) 141/64   Pulse 89   Temp 99.2 °F (37.3 °C) (Oral)   Resp 18   Ht 4' 11\" (1.499 m)   Wt 153 lb 4.8 oz (69.5 kg)   LMP  (LMP Unknown)   SpO2 100%   BMI 30.96 kg/m²   I/O:   PO/Intake:    fair PO intake, no problems observed or reported. Bowel/Bladder:  continent, no problems noted. General:  Patient is well developed, adequately nourished, non-obese and     well kempt. HEENT:    PERRLA, hearing intact to loud voice, external inspection of ear     and nose benign. Inspection of lips, tongue and gums benign  Musculoskeletal: No significant change in strength or tone. All joints stable. Inspection and palpation of digits and nails show no clubbing,       cyanosis or inflammatory conditions. Neuro/Psychiatric: Affect: flat but pleasant. Alert and oriented to self and     situation. No significant change in deep tendon reflexes or     sensation  Lungs:  Diminished, CTA-B. Respiration effort is normal at rest.     Heart:   S1 = S2,  RRR. No loud murmurs. Abdomen:  Soft, non-tender, no enlargement of liver or spleen. Extremities:  No significant lower extremity edema or tenderness. Skin:    BUE bruises dt blood draws, no visualized or palpated problems.     Rehabilitation:  Physical therapy: FIMS:  Bed Mobility: Scooting: Maximal assistance    Transfers: Sit to Stand: Stand by assistance  Stand to sit: Stand by assistance, Ambulation 1  Surface: level tile  Device: Rolling Walker  Assistance: Stand by assistance  Quality of Gait: FF posture, shortened step length and steady pace. vc's for safety with pacing in tight spaces. Gait Deviations: Decreased step length, Decreased step height  Distance: 20'x2  Comments: Poor posture, fwd flexed trunk, rapid fatigue.,      FIMS:  ,  , Assessment: Pt ambulated with WW with SBA. Pt slightly impulsive and quick paced in tight spaces. Cueing for safety with good follow through.     Occupational therapy: FIMS:   ,  ,      Speech therapy: FIMS:        Lab/X-ray studies reviewed, analyzed and discussed with patient and staff:   Recent Results (from the past 24 hour(s))   POCT Glucose    Collection Time: 10/17/20  8:48 PM   Result Value Ref Range    POC Glucose 295 (H) 60 - 115 mg/dl    Performed on ACCU-CHEK    Basic Metabolic Panel w/ Reflex to MG    Collection Time: 10/18/20  5:52 AM   Result Value Ref Range    Sodium 132 (L) 135 - 144 mEq/L    Potassium reflex Magnesium 4.2 3.4 - 4.9 mEq/L    Chloride 95 95 - 107 mEq/L    CO2 29 20 - 31 mEq/L    Anion Gap 8 (L) 9 - 15 mEq/L    Glucose 113 (H) 70 - 99 mg/dL    BUN 30 (H) 8 - 23 mg/dL    CREATININE 2.99 (H) 0.50 - 0.90 mg/dL    GFR Non-African American 15.2 (L) >60    GFR  18.4 (L) >60    Calcium 8.8 8.5 - 9.9 mg/dL   CBC    Collection Time: 10/18/20  5:54 AM   Result Value Ref Range    WBC 12.3 (H) 4.8 - 10.8 K/uL    RBC 3.00 (L) 4.20 - 5.40 M/uL    Hemoglobin 8.2 (L) 12.0 - 16.0 g/dL    Hematocrit 24.7 (L) 37.0 - 47.0 %    MCV 82.4 82.0 - 100.0 fL    MCH 27.2 27.0 - 31.3 pg    MCHC 33.0 33.0 - 37.0 %    RDW 22.3 (H) 11.5 - 14.5 %    Platelets 450 128 - 941 K/uL   POCT Glucose    Collection Time: 10/18/20  6:43 AM   Result Value Ref Range    POC Glucose 149 (H) 60 - 115 mg/dl    Performed on ACCU-CHEK    POCT Glucose    Collection Time: 10/18/20 11:10 AM   Result Value Ref Range    POC Glucose 144 (H) 60 - 115 mg/dl    Performed on ACCU-CHEK    POCT Glucose    Collection Time: 10/18/20  4:06 PM   Result Value Ref Range    POC Glucose 135 (H) 60 - 115 mg/dl    Performed on ACCU-CHEK    Urinalysis    Collection Time: 10/18/20  4:16 PM   Result Value Ref Range    Color, UA Yellow Straw/Yellow    Clarity, UA Clear Clear    Glucose, Ur Negative Negative mg/dL    Bilirubin Urine Negative Negative    Ketones, Urine Negative Negative mg/dL    Specific Gravity, UA 1.009 1.005 - 1.030    Blood, Urine SMALL (A) Negative    pH, UA 7.0 5.0 - 9.0    Protein, UA 30 (A) Negative mg/dL    Urobilinogen, Urine 0.2 <2.0 E.U./dL    Nitrite, Urine Negative Negative    Leukocyte Esterase, Urine TRACE (A) Negative   Microscopic Urinalysis    Collection Time: 10/18/20  4:16 PM   Result Value Ref Range    Bacteria, UA Negative Negative /HPF    Hyaline Casts, UA 0-1 0 - 5 /HPF    WBC, UA 6-9 (A) 0 - 5 /HPF    RBC, UA 3-5 (A) 0 - 5 /HPF    Epithelial Cells, UA 3-5 0 - 5 /HPF       Previous extensive, complex labs, notes and diagnostics reviewed and analyzed. ALLERGIES:    Allergies as of 10/05/2020 - In progress 10/05/2020   Allergen Reaction Noted    Ibuprofen Nausea Only 11/25/2014    Metformin and related  01/29/2014    Darvon [propoxyphene hcl] Nausea And Vomiting 09/07/2012      (please also verify by checking [facility])     Complex Physical Medicine & Rehab Issues Assess & Plan:   1. Severe abnormality of gait and mobility and impaired self-care and ADL's secondary to encephalopathy status post cerebrovascular disease and cardiac arrest..  Functional and medical status reassessed regarding patients ability to participate in therapies and patient found to be able to participate in  acute intensive comprehensive inpatient rehabilitation program including PT/OT to improve balance, ambulation, ADLs, and to improve the P/AROM.    It is my opinion that they will be able to tolerate 3 hours of therapy a day and benefit from it at an acute level. 2. Bowel constipation and Bladder dysfunction overactive bladder:  frequent toileting, ambulate to bathroom with assistance, check post void residuals. Check for C.difficile x1 if >2 loose stools in 24 hours, continue bowel & bladder program.  Monitor for UTI symptoms including lethargy and confusion  3. Severe with her chronic mid and low back pain flared by recent cardiac arrest with resuscitation efforts and generalized OA pain: reassess pain every shift and prior to and after each therapy session, give prn  Tylenol, modalities prn in therapy, consider Lidoderm, K-pad prn.   4. Skin breakdown risk:  continue pressure relief program.  Daily skin exams and reports from nursing. 5. Severe fatigue due to immobility and nutritional deficits: Add vitamin B12 vitamin D and CoQ10 titrate dosing and add protein supplementation with low carb content. 6. Complex discharge planning:   Await clearance for acute rehab both medically and through [facility]. Complex Active General Medical Issues that complicate care Assess & Plan:     1. Principal Problem:    Sepsis ([facility] [address]  Active Problems:    Lumbosacral radiculopathy at L5    Cardiopulmonary arrest ([facility])    Hypertension    Hyperlipidemia    Reactive depression    CHF (congestive heart failure) ([facility] [address]    Uncontrolled type 2 diabetes mellitus with hyperglycemia ([facility])    COPD exacerbation ([facility])    Major depressive disorder in remission ([facility] [address]    Gastroesophageal reflux disease without esophagitis    Acute cystitis without hematuria    CKD (chronic kidney disease) stage 4, GFR 15-29 ml/min ([facility])    Gait abnormality    Late effects of CVA (cerebrovascular accident)  Resolved Problems:    * No resolved hospital problems.  Denis Moralez D.O., PM&R     Attending    [address]

## 2020-10-18 NOTE — CARE COORDINATION
MET WITH PATIENT AND SPOUSE FOR DISCHARGE PLANNING. NOTIFIED THEM OF PEER 2 PEER/REHAB DENIAL. SNF/[facility] SPOUSE DECLINES. AFTER A LONG DISCUSSION THE SPOUSE FEELS SAFE WITH PATIENT GOING HOME IN A FEW DAYS WITH [facility], FREEDOM OF CHOICE WAS GIVEN. SPOUSE STATES THEIR SON WILL BE STAYING TO HELP CARE FOR THE PATIENT AND THEIR DTR WILL HELP ALSO. THEY WOULD LIKE A BSC. SPOUSE STATES THEY HAVE ALL OTHER EQUIPMENT NEEDED AND IS LOOKING INTO A STAIR LIFT. HIM AND THE SON ARE GETTING A BEDROOM ON THE FIRST FLOOR ARRANGED FOR THE PATIENT AND WILL BE READY IN A COUPLE DAYS. WILL NEED RX FOR BSC AND ORDER FOR HHC, NOTE LEFT.

## 2020-10-18 NOTE — PROGRESS NOTES
Nephrology Progress Note    Assessment:  67y.o. year old female with history s/f T2DM, AMY, HTN, HLD, depression, fibromyalgia, chronic back pain, CHF, COPD and CKD stage III who presented for dysuria. Was being rx'd for UTI however pt suffered cardiac arrest (10/07). Has severe saúl that has slowly been improving     1. SAÚL on CKD: etiology unclear for initial worsening as not hypotensive, no contrast or nephrotoxic medications, likely however has contribution from cardiac arrest  2. CKD stage III: most likely in setting of T2DM/HTN, baseline Scr ~1.1-1.2 w/ eGFR mid/high 50s   3. Hyperkalemia 2/2 cardiac arrest, corrected w/ medical management  4. Cardiac arrest: CPR for ~1 hr then another ~15 mins  5. Bradycardia s/p pacemaker placement  6. Anemia with some contribution of kidneys. tsat 17%  7. Fluid overload on 40 mg iv lasix     Plan:  - on 8k retacrit weekly. Added a dose of iv iron on 10/17  - on coreg, clonidine, aldactone verapamil for bp.   Hold off on ace/arb  - was on lasix 40 iv daily - changed to torsemide 50    Patient Active Problem List:     Hypertension     Hyperlipidemia     Uncontrolled type 2 diabetes mellitus with complication, without long-term current use of insulin (HCC)     Fibromyalgia     Anxiety     Smoker     Insomnia     DJD (degenerative joint disease), lumbar     Lumbosacral radiculopathy at S1     DDD (degenerative disc disease), lumbar     Dysphonia     CTS (carpal tunnel syndrome)     High risk medication use-Norco - 12/20/17 [facility] PM&R, 02/20/18 [facility] PM&R, 03/07/18 [facility] PM&R, Urine Drug screen negative 02/06/17 PM&R--MED CONTRACT 2/6/17     SOB (shortness of breath) on exertion     Chest pain     Memory deficit     Artificial lens present     Presbyopia     Astigmatism, regular     Cataract, nuclear sclerotic senile     IDDM (insulin dependent diabetes mellitus)     Regular astigmatism     Nuclear senile cataract     Neck pain     Lumbosacral radiculopathy at L5     Spinal stenosis of lumbar region with neurogenic claudication     Impaired mobility and activities of daily living     Non-compliant patient NO showed FU 1/10/17 Dr Melissa Hernandez     Insomnia secondary to chronic pain     Reactive depression     Diabetic radiculopathy (HCC)     Cervical radicular pain     Diabetic asymmetric polyneuropathy ([facility])     Myalgia     Intercostal neuropathy     Osteoarthritis of spine with radiculopathy, lumbar region     Acute combined systolic and diastolic CHF, NYHA class 1 ([facility])     PMB (postmenopausal bleeding)     Acute on chronic diastolic heart failure (HCC)     CHF (congestive heart failure) ([facility])     Hypertensive urgency     Uncontrolled type 2 diabetes mellitus with hyperglycemia (HCC)     Chronic obstructive pulmonary disease with acute exacerbation (HCC)     Acute on chronic diastolic (congestive) heart failure ([facility])     SAÚL (acute kidney injury) ([address]     Hypoglycemia     HOCM (hypertrophic obstructive cardiomyopathy) ([facility])     Gastrointestinal hemorrhage     COPD exacerbation ([facility])     Anemia     AVM (arteriovenous malformation)     Symptomatic anemia     Flash pulmonary edema ([facility])     Acute on chronic kidney failure ([facility])     Dysarthria     Chronic fatigue     Acute encephalopathy     Adenomatous polyp of sigmoid colon     Adenomatous polyp of transverse colon     Sepsis ([facility])     Major depressive disorder in remission ([address]     Gastroesophageal reflux disease without esophagitis     Acute cystitis without hematuria     CKD (chronic kidney disease) stage 4, GFR 15-29 ml/min ([facility])     Cardiopulmonary arrest ([address]      Subjective:  Admit Date: 10/5/2020    Interval History: renal function slowly improving, feels stronger. No fevers. Swelling still there. Changed to oral diuretics.   No new complaints    Medications:  Scheduled Meds:   guaiFENesin  600 mg Oral BID    torsemide  50 mg Oral Daily    insulin glargine  30 Units Subcutaneous Lunch    [START ON 10/19/2020] epoetin dick-epbx  8,000 Units Subcutaneous Weekly    insulin lispro  6 Units Subcutaneous TID     cloNIDine  0.2 mg Oral TID    isosorbide mononitrate  60 mg Oral Daily    insulin lispro  0-12 Units Subcutaneous TID     insulin lispro  0-6 Units Subcutaneous Nightly    carvedilol  25 mg Oral BID WC    spironolactone  25 mg Oral Daily    sodium chloride  20 mL Intravenous Once    verapamil  120 mg Oral Nightly    sodium chloride flush  10 mL Intravenous 2 times per day    pantoprazole  40 mg Intravenous Daily    And    sodium chloride (PF)  10 mL Intravenous Daily    PARoxetine  40 mg Oral QAM    rosuvastatin  40 mg Oral QPM     Continuous Infusions:   dextrose      dextrose         CBC:   Recent Labs     10/17/20  0600 10/18/20  0554   WBC 14.5* 12.3*   HGB 8.1* 8.2*    272     CMP:    Recent Labs     10/16/20  0600 10/17/20  0600 10/18/20  0552    134* 132*   K 4.2 4.5  4.5 4.2   CL 99 98 95   CO2 27 25 29   BUN 35* 34* 30*   CREATININE 3.24* 3.17* 2.99*   GLUCOSE 65* 333* 113*   CALCIUM 8.4* 8.7 8.8   LABGLOM 13.9* 14.2* 15.2*     Troponin:   No results for input(s): TROPONINI in the last 72 hours. BNP: No results for input(s): BNP in the last 72 hours. INR:   No results for input(s): INR in the last 72 hours. Lipids:   No results for input(s): CHOL, LDLDIRECT, TRIG, HDL, AMYLASE, LIPASE in the last 72 hours. Liver:   Recent Labs     10/17/20  0600   AST 53*   *   ALKPHOS 202*   PROT 5.1*   LABALBU 2.7*   BILITOT <0.2     Iron:  No results for input(s): IRONS, FERRITIN in the last 72 hours. Invalid input(s): LABIRONS  Urinalysis: No results for input(s): UA in the last 72 hours.     Objective:  Vitals: BP (!) 153/59   Pulse 71   Temp 98.5 °F (36.9 °C) (Oral)   Resp 17   Ht 4' 11\" (1.499 m)   Wt 153 lb 4.8 oz (69.5 kg)   LMP  (LMP Unknown)   SpO2 100%   BMI 30.96 kg/m²    Wt Readings from Last 3 Encounters:   10/16/20 153 lb 4.8 oz (69.5 kg)   09/29/20 130 lb (59 kg) 08/24/20 130 lb (59 kg)      24HR INTAKE/OUTPUT:      Intake/Output Summary (Last 24 hours) at 10/18/2020 1033  Last data filed at 10/18/2020 0842  Gross per 24 hour   Intake 20 ml   Output 200 ml   Net -180 ml       General: weak, no acute distress  HEENT: normocephalic, atraumatic,  Lungs: somewhat diminished  Heart: regular rate and rhythm, no murmurs or rubs  Abdomen: soft, non-tender, non-distended  Ext: 1+ edema  Neuro: answering all questions    Electronically signed by Sandip Madrigal MD

## 2020-10-18 NOTE — PROGRESS NOTES
Hospitalist Daily Progress Note  Name: Ellen Lee  Age: 67 y.o. Gender: female  CodeStatus: Full Code  Allergies: Ibuprofen  Metformin And Related  Darvon [Propoxyphene Hcl]    Chief Complaint:Abdominal Pain    Primary Care Provider: Jazmin Son MD  InpatientTreatment Team: Treatment Team: Attending Provider: Adrianne Johnston DO; Utilization Reviewer: Augustin Gregory RN; Consulting Physician: Ruddy Wilkerson MD; Consulting Physician: Farooq Magallon MD; Consulting Physician: Lyndsey Serra MD; Wound Care: Audi Davis RN; : Adrian Lopez, AKASH; Registered Nurse: Connor Clark RN  Admission Date: 10/5/2020      Subjective: Patient seen and bedside. Pt is afebrile, vitals are stable. Pt is tearful and glum today, sad about prolonged hospitalization. Pt has been on Paxil previously. Discussed with Dr. Byron De Leon from [facility], acute rehab is denied despite peer to peer. Physical Exam  Constitutional:       Appearance: Normal appearance. She is not ill-appearing or diaphoretic. HENT:      Head: Normocephalic and atraumatic. Nose: Nose normal.      Mouth/Throat:      Mouth: Mucous membranes are moist.      Pharynx: Oropharynx is clear. Eyes:      Extraocular Movements: Extraocular movements intact. Pupils: Pupils are equal, round, and reactive to light. Cardiovascular:      Rate and Rhythm: Normal rate. Heart sounds: Murmur present. No friction rub. No gallop. Pulmonary:      Breath sounds: Wheezing and rales present. No rhonchi. Abdominal:      General: There is no distension. Tenderness: There is no abdominal tenderness. There is no guarding. Musculoskeletal: Normal range of motion. General: Swelling present. Neurological:      General: No focal deficit present. Mental Status: She is alert and oriented to person, place, and time. Psychiatric:         Mood and Affect: Mood normal.         Thought Content:  Thought content normal. Judgment: Judgment normal.         Review of Systems  14 point ROS is reviewed and negative  Medications:  Reviewed    Infusion Medications:    dextrose      dextrose       Scheduled Medications:    guaiFENesin  600 mg Oral BID    torsemide  50 mg Oral Daily    insulin glargine  30 Units Subcutaneous Lunch    [START ON 10/19/2020] epoetin dick-epbx  8,000 Units Subcutaneous Weekly    insulin lispro  6 Units Subcutaneous TID WC    cloNIDine  0.2 mg Oral TID    isosorbide mononitrate  60 mg Oral Daily    insulin lispro  0-12 Units Subcutaneous TID WC    insulin lispro  0-6 Units Subcutaneous Nightly    carvedilol  25 mg Oral BID WC    spironolactone  25 mg Oral Daily    sodium chloride  20 mL Intravenous Once    verapamil  120 mg Oral Nightly    sodium chloride flush  10 mL Intravenous 2 times per day    pantoprazole  40 mg Intravenous Daily    And    sodium chloride (PF)  10 mL Intravenous Daily    PARoxetine  40 mg Oral QAM    rosuvastatin  40 mg Oral QPM     PRN Meds: ipratropium-albuterol, melatonin, hydrOXYzine, glucose, dextrose, glucagon (rDNA), dextrose, sodium chloride flush, lidocaine viscous hcl, magic (miracle) mouthwash, potassium chloride, sodium chloride flush, promethazine **OR** ondansetron, ondansetron, acetaminophen **OR** acetaminophen, glucose, dextrose, glucagon (rDNA), dextrose    Labs:   Recent Labs     10/15/20  0533 10/15/20  1414 10/16/20  0600 10/17/20  0600   WBC 16.1*  --  15.5* 14.5*   HGB 9.0* 8.5* 8.6* 8.1*   HCT 27.2* 26.3* 26.2* 24.7*     --  185 231     Recent Labs     10/15/20  0533 10/16/20  0600 10/17/20  0600    135 134*   K 3.5 4.2 4.5  4.5   CL 99 99 98   CO2 29 27 25   BUN 39* 35* 34*   CREATININE 3.56* 3.24* 3.17*   CALCIUM 8.2* 8.4* 8.7     Recent Labs     10/17/20  0600   AST 53*   *   BILITOT <0.2   ALKPHOS 202*     No results for input(s): INR in the last 72 hours.   No results for input(s): Vivianabdulkadir Becerras in the last 72 hours.    Urinalysis:   Lab Results   Component Value Date    NITRU POSITIVE 10/05/2020    WBCUA >100 10/05/2020    BACTERIA MANY 10/05/2020    RBCUA 20-50 10/05/2020    BLOODU LARGE 10/05/2020    SPECGRAV 1.015 10/05/2020    GLUCOSEU >=1000 10/05/2020       Radiology:   Most recent    Chest CT      WITH CONTRAST:No results found for this or any previous visit. WITHOUT CONTRAST:   Results for orders placed during the hospital encounter of 08/24/20   CT CHEST WO CONTRAST    Narrative EXAMINATION:  CT SCAN THE CHEST    CLINICAL HISTORY:  Short of breath    COMPARISON:  March 24, 2019    TECHNIQUE:  Multiple serial axial images of the chest from the base the neck through the upper abdomen with both sagittal coronal reconstruction was performed without intravenous or oral administration of contrast.    FINDINGS:    There is a patchy mosaic appearance lung parenchyma that can be seen with small airways disease. There is bibasilar areas of atelectasis, scarring. No focal infiltrates. No effusions no pneumothoraces. No significant periaortic adenopathy. There is pretracheal adenopathy. There is multilevel degenerative changes with osteophytes of the thoracic spine. Impression Unremarkable CT scan the chest as described above      All CT scans at this facility use dose modulation, iterative reconstruction, and/or weight based dosing when appropriate to reduce radiation dose to as low as reasonably achievable. Examination: CT ABDOMEN PELVIS WO CONTRAST, CT CHEST WO CONTRAST    Indication:   flank pain     Technique: Multiple serial axial images was performed through the abdomen and pelvis without intravenous or oral administration of contrast..   Images were reconstructed in the axial and coronal and sagittal planes. Comparison: September 9, 2019    Findings: The liver, gallbladder, spleen, pancreas, adrenals,  are unremarkable. The kidneys show no significant perinephric stranding.  No nephrolithiasis. No hydronephrosis or hydroureter. No bladder calculi. Large and small bowel show no sign of obstruction. The appendix is not visualized. No pericecal stranding. No diverticulitis. No free air. No free fluid. The visualized abdominal aorta is of normal size and caliber. No significant retroperitoneal adenopathy. There is multilevel degenerative changes of lumbar spine. There is a grade 1 anterolisthesis of L4 and L5. There is narrowing of the L5-S1 disc space. Impression: UNREMARKABLE CT SCAN OF THE ABDOMEN AND PELVIS AS DESCRIBED ABOVE      All CT scans at this facility use dose modulation, iterative reconstruction, and/or weight based dosing when appropriate to reduce radiation dose to as low as reasonably achievable. CXR      2-view:   Results for orders placed during the hospital encounter of 08/24/20   XR CHEST (2 VW)    Narrative EXAMINATION: XR CHEST (2 VW)     CLINICAL HISTORY:  sob     COMPARISONS: None     FINDINGS:    Two views of the chest are submitted. The cardiac silhouette is enlarged  Pulmonary vascular congestion with increased interstitial markings. Right sided trachea. No focal infiltrates. No effusions. No Pneumothoraces. Impression PULMONARY VASCULAR CONGESTION WITH INCREASED INTERSTITIAL MARKINGS. RADIOGRAPHIC FINDINGS COULD SUGGEST EARLY CHF. CHF. CORRELATE CLINICALLY        Portable:   Results for orders placed during the hospital encounter of 10/05/20   XR CHEST PORTABLE    Narrative EXAMINATION: XR CHEST PORTABLE. DATE AND TIME:10/9/2020 5:00 AM     CLINICAL HISTORY: SHORTNESS OF BREATH      COMPARISONS: OCTOBER 8, 2020     FINDINGS: ET tube and nasogastric tube remain in satisfactory position. Persistent to increased basilar markings bilaterally with no significant change.       Impression NO CHANGE               Echo No results found for this or any previous In addition to examining and evaluating pt, I spent additional time explaining care, normaland abnormal findings, and treatment plan. All of pt questions were answered. Counseling, diet and education were provided. Case will be discussed with nursing staff when appropriate. Family will be updated if and whenappropriate.       Electronically signed by Castillo Armijo DO on 10/17/2020 at 8:00 PM

## 2020-10-18 NOTE — PROGRESS NOTES
Endocrinology Progress Note    Assessment and Plan:   Assessment-  1. Type 2 insulin-dependent diabetes  2. Urosepsis  3. Hyperglycemia    Plan-  1. Continue Lantus 30 units at lunchtime  2. Continue Humalog 6 units before meals  3. Medium dose sliding scale coverage  4. Monitor glycemic control closely  5. Patient may be discharged to rehab from endocrinology standpoint    POC Glucose:   Recent Labs     10/17/20  0543 10/17/20  1130 10/17/20  1552 10/17/20  2048 10/18/20  0643   POCGLU 365* 337* 219* 295* 149*     HGBA1C:  Lab Results   Component Value Date    LABA1C 9.2 (H) 10/13/2020    LABA1C 10.1 (H) 10/12/2020    LABA1C 11.8 (H) 10/07/2020     CBC:   Recent Labs     10/17/20  0600 10/18/20  0554   WBC 14.5* 12.3*   HGB 8.1* 8.2*    272     CMP:    Recent Labs     10/16/20  0600 10/17/20  0600 10/18/20  0552    134* 132*   K 4.2 4.5  4.5 4.2   CL 99 98 95   CO2 27 25 29   BUN 35* 34* 30*   CREATININE 3.24* 3.17* 2.99*   GLUCOSE 65* 333* 113*   CALCIUM 8.4* 8.7 8.8   LABGLOM 13.9* 14.2* 15.2*         CC:   Chief Complaint   Patient presents with    Abdominal Pain       Subjective: Interval History: Daljit Bryan is a 58-year-old female with a history of uncontrolled diabetes, having dysuria for the past over the past week worsening. Presented to the ER with fevers and chills diagnosis with urosepsis and admitted. She has a history of CKD with her creatinine at 3.8 upon admission. Her A1c was 9.2 however she is severely anemic and this may be falsely decreased.   Her glycemic control is improving on her current insulin dosing regiment    Review of systems: denies polyuria, polydipsia, ABD pain, flank pain, N/V/D, or diaphoresis  Medications:   Scheduled Meds:   guaiFENesin  600 mg Oral BID    torsemide  50 mg Oral Daily    insulin glargine  30 Units Subcutaneous Lunch    [START ON 10/19/2020] epoetin dick-epbx  8,000 Units Subcutaneous Weekly    insulin lispro  6 Units Subcutaneous TID WC    cloNIDine  0.2 mg Oral TID    isosorbide mononitrate  60 mg Oral Daily    insulin lispro  0-12 Units Subcutaneous TID WC    insulin lispro  0-6 Units Subcutaneous Nightly    carvedilol  25 mg Oral BID WC    spironolactone  25 mg Oral Daily    sodium chloride  20 mL Intravenous Once    verapamil  120 mg Oral Nightly    sodium chloride flush  10 mL Intravenous 2 times per day    pantoprazole  40 mg Intravenous Daily    And    sodium chloride (PF)  10 mL Intravenous Daily    PARoxetine  40 mg Oral QAM    rosuvastatin  40 mg Oral QPM     Continuous Infusions:   dextrose      dextrose         Objective:   Vitals: BP (!) 153/59   Pulse 71   Temp 98.5 °F (36.9 °C) (Oral)   Resp 17   Ht 4' 11\" (1.499 m)   Wt 153 lb 4.8 oz (69.5 kg)   LMP  (LMP Unknown)   SpO2 100%   BMI 30.96 kg/m²    Wt Readings from Last 3 Encounters:   10/16/20 153 lb 4.8 oz (69.5 kg)   09/29/20 130 lb (59 kg)   08/24/20 130 lb (59 kg)        General appearance: alert, appears older than stated age, cooperative and no distress  Skin: Skin color, texture, turgor normal. No rashes or lesions. Neck: no lymphadenopathy  Lungs: clear to auscultation bilaterally  Heart: regular rate and rhythm, S1, S2 normal, no murmur, click, rub or gallop  Abdomen: soft, non-tender. Bowel sounds normal. No masses,  no organomegaly.   Extremities: extremities normal, atraumatic, no cyanosis or edema    Patient Active Problem List:     Hypertension     Hyperlipidemia     Uncontrolled type 2 diabetes mellitus with complication, without long-term current use of insulin ([facility])     Fibromyalgia     Anxiety     Smoker     Insomnia     DJD (degenerative joint disease), lumbar     Lumbosacral radiculopathy at S1     DDD (degenerative disc disease), lumbar     Dysphonia     CTS (carpal tunnel syndrome)     High risk medication use-Norco - 12/20/17 OARRS PM&R, 02/20/18 OARRS PM&R, 03/07/18 OARRS PM&R, Urine Drug screen negative 02/06/17 PM&R--MED CONTRACT 2/6/17 SOB (shortness of breath) on exertion     Chest pain     Memory deficit     Artificial lens present     Presbyopia     Astigmatism, regular     Cataract, nuclear sclerotic senile     IDDM (insulin dependent diabetes mellitus)     Regular astigmatism     Nuclear senile cataract     Neck pain     Lumbosacral radiculopathy at L5     Spinal stenosis of lumbar region with neurogenic claudication     Impaired mobility and activities of daily living     Non-compliant patient NO showed FU 1/10/17 Dr [address]     Insomnia secondary to chronic pain     Reactive depression     Diabetic radiculopathy (HCC)     Cervical radicular pain     Diabetic asymmetric polyneuropathy (HCC)     Myalgia     Intercostal neuropathy     Osteoarthritis of spine with radiculopathy, lumbar region     Acute combined systolic and diastolic CHF, NYHA class 1 (HCC)     PMB (postmenopausal bleeding)     Acute on chronic diastolic heart failure (HCC)     CHF (congestive heart failure) ([facility])     Hypertensive urgency     Uncontrolled type 2 diabetes mellitus with hyperglycemia ([facility])     Chronic obstructive pulmonary disease with acute exacerbation (HCC)     Acute on chronic diastolic (congestive) heart failure ([facility])     SAÚL (acute kidney injury) ([address]     Hypoglycemia     HOCM (hypertrophic obstructive cardiomyopathy) ([facility])     Gastrointestinal hemorrhage     COPD exacerbation ([facility])     Anemia     AVM (arteriovenous malformation)     Symptomatic anemia     Flash pulmonary edema (HCC)     Acute on chronic kidney failure (HCC)     Dysarthria     Chronic fatigue     Acute encephalopathy     Adenomatous polyp of sigmoid colon     Adenomatous polyp of transverse colon     Sepsis (HCC)     Major depressive disorder in remission ([address]     Gastroesophageal reflux disease without esophagitis     Acute cystitis without hematuria     CKD (chronic kidney disease) stage 4, GFR 15-29 ml/min ([facility])     Cardiopulmonary arrest ([address]     Gait abnormality     Late effects of CVA (cerebrovascular accident)            Electronically signed by DIANE Ramires on 10/18/2020 at 11:09 AM

## 2020-10-18 NOTE — PROGRESS NOTES
Pt incont of urine and stool  Bed changed  Barrier cream applied to affected area on buttock  Handoff given to Pablo Johnson

## 2020-10-19 ENCOUNTER — TELEPHONE (OUTPATIENT)
Dept: FAMILY MEDICINE CLINIC | Age: 75
End: 2020-10-19

## 2020-10-19 LAB
ANION GAP SERPL CALCULATED.3IONS-SCNC: 8 MEQ/L (ref 9–15)
BUN BLDV-MCNC: 31 MG/DL (ref 8–23)
CALCIUM SERPL-MCNC: 8.9 MG/DL (ref 8.5–9.9)
CHLORIDE BLD-SCNC: 98 MEQ/L (ref 95–107)
CO2: 29 MEQ/L (ref 20–31)
CREAT SERPL-MCNC: 2.91 MG/DL (ref 0.5–0.9)
GFR AFRICAN AMERICAN: 19
GFR NON-AFRICAN AMERICAN: 15.7
GLUCOSE BLD-MCNC: 132 MG/DL (ref 60–115)
GLUCOSE BLD-MCNC: 141 MG/DL (ref 70–99)
GLUCOSE BLD-MCNC: 210 MG/DL (ref 60–115)
GLUCOSE BLD-MCNC: 252 MG/DL (ref 60–115)
GLUCOSE BLD-MCNC: 289 MG/DL (ref 60–115)
HCT VFR BLD CALC: 23 % (ref 37–47)
HEMOGLOBIN: 7.5 G/DL (ref 12–16)
MCH RBC QN AUTO: 27.1 PG (ref 27–31.3)
MCHC RBC AUTO-ENTMCNC: 32.6 % (ref 33–37)
MCV RBC AUTO: 83.1 FL (ref 82–100)
PDW BLD-RTO: 22.9 % (ref 11.5–14.5)
PERFORMED ON: ABNORMAL
PLATELET # BLD: 370 K/UL (ref 130–400)
POTASSIUM REFLEX MAGNESIUM: 4.4 MEQ/L (ref 3.4–4.9)
RBC # BLD: 2.77 M/UL (ref 4.2–5.4)
SODIUM BLD-SCNC: 135 MEQ/L (ref 135–144)
WBC # BLD: 12.2 K/UL (ref 4.8–10.8)

## 2020-10-19 PROCEDURE — 6370000000 HC RX 637 (ALT 250 FOR IP): Performed by: INTERNAL MEDICINE

## 2020-10-19 PROCEDURE — 97116 GAIT TRAINING THERAPY: CPT

## 2020-10-19 PROCEDURE — 6360000002 HC RX W HCPCS: Performed by: INTERNAL MEDICINE

## 2020-10-19 PROCEDURE — 2060000000 HC ICU INTERMEDIATE R&B

## 2020-10-19 PROCEDURE — 80048 BASIC METABOLIC PNL TOTAL CA: CPT

## 2020-10-19 PROCEDURE — 85027 COMPLETE CBC AUTOMATED: CPT

## 2020-10-19 PROCEDURE — 2580000003 HC RX 258: Performed by: INTERNAL MEDICINE

## 2020-10-19 PROCEDURE — C9113 INJ PANTOPRAZOLE SODIUM, VIA: HCPCS | Performed by: INTERNAL MEDICINE

## 2020-10-19 PROCEDURE — 99231 SBSQ HOSP IP/OBS SF/LOW 25: CPT | Performed by: INTERNAL MEDICINE

## 2020-10-19 PROCEDURE — 99232 SBSQ HOSP IP/OBS MODERATE 35: CPT | Performed by: PHYSICAL MEDICINE & REHABILITATION

## 2020-10-19 RX ORDER — PANTOPRAZOLE SODIUM 40 MG/1
40 TABLET, DELAYED RELEASE ORAL
Status: DISCONTINUED | OUTPATIENT
Start: 2020-10-20 | End: 2020-10-20 | Stop reason: HOSPADM

## 2020-10-19 RX ORDER — TRAMADOL HYDROCHLORIDE 50 MG/1
50 TABLET ORAL EVERY 12 HOURS PRN
Status: DISCONTINUED | OUTPATIENT
Start: 2020-10-19 | End: 2020-10-20 | Stop reason: HOSPADM

## 2020-10-19 RX ORDER — LIDOCAINE 4 G/G
1 PATCH TOPICAL DAILY
Status: DISCONTINUED | OUTPATIENT
Start: 2020-10-19 | End: 2020-10-20 | Stop reason: HOSPADM

## 2020-10-19 RX ADMIN — PAROXETINE HYDROCHLORIDE 40 MG: 20 TABLET, FILM COATED ORAL at 09:34

## 2020-10-19 RX ADMIN — Medication 10 ML: at 10:53

## 2020-10-19 RX ADMIN — CARVEDILOL 25 MG: 25 TABLET, FILM COATED ORAL at 12:28

## 2020-10-19 RX ADMIN — ACETAMINOPHEN 650 MG: 325 TABLET, FILM COATED ORAL at 09:34

## 2020-10-19 RX ADMIN — PANTOPRAZOLE SODIUM 40 MG: 40 INJECTION, POWDER, FOR SOLUTION INTRAVENOUS at 09:34

## 2020-10-19 RX ADMIN — CLONIDINE HYDROCHLORIDE 0.2 MG: 0.1 TABLET ORAL at 14:46

## 2020-10-19 RX ADMIN — TORSEMIDE 50 MG: 10 TABLET ORAL at 14:46

## 2020-10-19 RX ADMIN — ISOSORBIDE MONONITRATE 60 MG: 60 TABLET, EXTENDED RELEASE ORAL at 09:34

## 2020-10-19 RX ADMIN — ROSUVASTATIN CALCIUM 40 MG: 40 TABLET, FILM COATED ORAL at 23:44

## 2020-10-19 RX ADMIN — HYDROXYZINE HYDROCHLORIDE 25 MG: 25 TABLET, FILM COATED ORAL at 09:34

## 2020-10-19 RX ADMIN — INSULIN LISPRO 6 UNITS: 100 INJECTION, SOLUTION INTRAVENOUS; SUBCUTANEOUS at 09:37

## 2020-10-19 RX ADMIN — VERAPAMIL HYDROCHLORIDE 120 MG: 240 TABLET, FILM COATED, EXTENDED RELEASE ORAL at 22:27

## 2020-10-19 RX ADMIN — EPOETIN ALFA-EPBX 8000 UNITS: 4000 INJECTION, SOLUTION INTRAVENOUS; SUBCUTANEOUS at 16:58

## 2020-10-19 RX ADMIN — Medication 3 MG: at 22:27

## 2020-10-19 RX ADMIN — GUAIFENESIN 600 MG: 600 TABLET ORAL at 09:34

## 2020-10-19 RX ADMIN — GUAIFENESIN 600 MG: 600 TABLET ORAL at 22:27

## 2020-10-19 RX ADMIN — ACETAMINOPHEN 650 MG: 325 TABLET, FILM COATED ORAL at 14:46

## 2020-10-19 RX ADMIN — CLONIDINE HYDROCHLORIDE 0.2 MG: 0.1 TABLET ORAL at 22:27

## 2020-10-19 RX ADMIN — Medication 10 ML: at 09:34

## 2020-10-19 RX ADMIN — Medication 10 ML: at 22:27

## 2020-10-19 RX ADMIN — CARVEDILOL 25 MG: 25 TABLET, FILM COATED ORAL at 16:54

## 2020-10-19 RX ADMIN — INSULIN LISPRO 4 UNITS: 100 INJECTION, SOLUTION INTRAVENOUS; SUBCUTANEOUS at 12:29

## 2020-10-19 RX ADMIN — SPIRONOLACTONE 25 MG: 25 TABLET, FILM COATED ORAL at 12:28

## 2020-10-19 RX ADMIN — INSULIN GLARGINE 30 UNITS: 100 INJECTION, SOLUTION SUBCUTANEOUS at 12:29

## 2020-10-19 RX ADMIN — CLONIDINE HYDROCHLORIDE 0.2 MG: 0.1 TABLET ORAL at 09:34

## 2020-10-19 RX ADMIN — HYDROXYZINE HYDROCHLORIDE 25 MG: 25 TABLET, FILM COATED ORAL at 14:47

## 2020-10-19 ASSESSMENT — PAIN DESCRIPTION - ORIENTATION: ORIENTATION: LEFT

## 2020-10-19 ASSESSMENT — PAIN SCALES - GENERAL
PAINLEVEL_OUTOF10: 10
PAINLEVEL_OUTOF10: 10
PAINLEVEL_OUTOF10: 3

## 2020-10-19 ASSESSMENT — PAIN DESCRIPTION - LOCATION: LOCATION: RIB CAGE

## 2020-10-19 ASSESSMENT — PAIN DESCRIPTION - DESCRIPTORS: DESCRIPTORS: ACHING

## 2020-10-19 NOTE — PLAN OF CARE
Nutrition Problem #1: Inadequate oral intake  Intervention: Food and/or Nutrient Delivery: Modify Current Diet, Continue Oral Nutrition Supplement(Continue Carb Control 3 diet, replace low sodium with JENNI. Continue diabetic ONS TID)  Nutritional Goals: New goal with po diet initiated. PO intake >50% of meals/supplement.

## 2020-10-19 NOTE — PROGRESS NOTES
Hospitalist Daily Progress Note  Name: Ellen Lee  Age: 67 y.o. Gender: female  CodeStatus: Full Code  Allergies: Ibuprofen  Metformin And Related  Darvon [Propoxyphene Hcl]    Chief Complaint:Abdominal Pain    Primary Care Provider: Jazmin Son MD  InpatientTreatment Team: Treatment Team: Attending Provider: Car John DO; Utilization Reviewer: Augustin Gregory RN; Consulting Physician: Ruddy Wilkerson MD; Consulting Physician: Farooq Magallon MD; Consulting Physician: Lyndsey Serra MD; Wound Care: Audi Davis RN; : Adrian Lopez RN; Patient Care Tech: Germania Brito; Registered Nurse: Kajal Morales RN; Patient Care Tech: Ivonne Gayle; : MILAN Malcolm; Nursing Student: Daniel Danielson; : Don Yeh RN  Admission Date: 10/5/2020      Subjective: Patient seen and bedside. Some rib pain. Otherwise doing well. Physical Exam  Constitutional:       Appearance: Normal appearance. She is not ill-appearing or diaphoretic. HENT:      Head: Normocephalic and atraumatic. Nose: Nose normal.      Mouth/Throat:      Mouth: Mucous membranes are moist.      Pharynx: Oropharynx is clear. Eyes:      Extraocular Movements: Extraocular movements intact. Pupils: Pupils are equal, round, and reactive to light. Cardiovascular:      Rate and Rhythm: Normal rate. Heart sounds: Murmur present. No friction rub. No gallop. Pulmonary:      Breath sounds: Wheezing and rales present. No rhonchi. Abdominal:      General: There is no distension. Tenderness: There is no abdominal tenderness. There is no guarding. Musculoskeletal: Normal range of motion. General: Swelling present. Neurological:      General: No focal deficit present. Mental Status: She is alert and oriented to person, place, and time. Psychiatric:         Mood and Affect: Mood normal.         Thought Content:  Thought content normal. Judgment: Judgment normal.         Review of Systems  14 point ROS is reviewed and negative  Medications:  Reviewed    Infusion Medications:    dextrose      dextrose       Scheduled Medications:    [START ON 10/20/2020] pantoprazole  40 mg Oral QAM AC    lidocaine  1 patch Transdermal Daily    guaiFENesin  600 mg Oral BID    torsemide  50 mg Oral Daily    insulin glargine  30 Units Subcutaneous Lunch    epoetin dick-epbx  8,000 Units Subcutaneous Weekly    insulin lispro  6 Units Subcutaneous TID WC    cloNIDine  0.2 mg Oral TID    isosorbide mononitrate  60 mg Oral Daily    insulin lispro  0-12 Units Subcutaneous TID WC    insulin lispro  0-6 Units Subcutaneous Nightly    carvedilol  25 mg Oral BID WC    spironolactone  25 mg Oral Daily    sodium chloride  20 mL Intravenous Once    verapamil  120 mg Oral Nightly    sodium chloride flush  10 mL Intravenous 2 times per day    sodium chloride (PF)  10 mL Intravenous Daily    PARoxetine  40 mg Oral QAM    rosuvastatin  40 mg Oral QPM     PRN Meds: traMADol, diphenhydrAMINE, ipratropium-albuterol, melatonin, hydrOXYzine, glucose, dextrose, glucagon (rDNA), dextrose, sodium chloride flush, lidocaine viscous hcl, magic (miracle) mouthwash, potassium chloride, sodium chloride flush, promethazine **OR** ondansetron, ondansetron, acetaminophen **OR** acetaminophen, glucose, dextrose, glucagon (rDNA), dextrose    Labs:   Recent Labs     10/17/20  0600 10/18/20  0554 10/19/20  0547   WBC 14.5* 12.3* 12.2*   HGB 8.1* 8.2* 7.5*   HCT 24.7* 24.7* 23.0*    272 370     Recent Labs     10/17/20  0600 10/18/20  0552 10/19/20  0547   * 132* 135   K 4.5  4.5 4.2 4.4   CL 98 95 98   CO2 25 29 29   BUN 34* 30* 31*   CREATININE 3.17* 2.99* 2.91*   CALCIUM 8.7 8.8 8.9     Recent Labs     10/17/20  0600   AST 53*   *   BILITOT <0.2   ALKPHOS 202*     No results for input(s): INR in the last 72 hours.   No results for input(s): Vivian Belts in stranding. No nephrolithiasis. No hydronephrosis or hydroureter. No bladder calculi. Large and small bowel show no sign of obstruction. The appendix is not visualized. No pericecal stranding. No diverticulitis. No free air. No free fluid. The visualized abdominal aorta is of normal size and caliber. No significant retroperitoneal adenopathy. There is multilevel degenerative changes of lumbar spine. There is a grade 1 anterolisthesis of L4 and L5. There is narrowing of the L5-S1 disc space. Impression: UNREMARKABLE CT SCAN OF THE ABDOMEN AND PELVIS AS DESCRIBED ABOVE      All CT scans at this facility use dose modulation, iterative reconstruction, and/or weight based dosing when appropriate to reduce radiation dose to as low as reasonably achievable. CXR      2-view:   Results for orders placed during the hospital encounter of 08/24/20   XR CHEST (2 VW)    Narrative EXAMINATION: XR CHEST (2 VW)     CLINICAL HISTORY:  sob     COMPARISONS: None     FINDINGS:    Two views of the chest are submitted. The cardiac silhouette is enlarged  Pulmonary vascular congestion with increased interstitial markings. Right sided trachea. No focal infiltrates. No effusions. No Pneumothoraces. Impression PULMONARY VASCULAR CONGESTION WITH INCREASED INTERSTITIAL MARKINGS. RADIOGRAPHIC FINDINGS COULD SUGGEST EARLY CHF. CHF. CORRELATE CLINICALLY        Portable:   Results for orders placed during the hospital encounter of 10/05/20   XR CHEST PORTABLE    Narrative EXAMINATION: XR CHEST PORTABLE. DATE AND TIME:10/9/2020 5:00 AM     CLINICAL HISTORY: SHORTNESS OF BREATH      COMPARISONS: OCTOBER 8, 2020     FINDINGS: ET tube and nasogastric tube remain in satisfactory position. Persistent to increased basilar markings bilaterally with no significant change.       Impression NO CHANGE               Echo No results found for this or any previous visit. Assessment/Plan:    Active Hospital Problems    Diagnosis Date Noted    Cardiopulmonary arrest [facility]) [I46.9]      Priority: High    Lumbosacral radiculopathy at L5 [M54.17] 03/16/2015     Priority: High     Class: Acute    Gait abnormality [R26.9] 10/14/2020    Late effects of CVA (cerebrovascular accident) [I69.90] 10/14/2020    Sepsis ([facility] [A41.9] 10/06/2020    Major depressive disorder in remission ([facility] [F32.5] 10/06/2020    Gastroesophageal reflux disease without esophagitis [K21.9] 10/06/2020    Acute cystitis without hematuria [N30.00] 10/06/2020    CKD (chronic kidney disease) stage 4, GFR 15-29 ml/min ([facility] [N18.4] 10/06/2020    COPD exacerbation ([facility] [J44.1] 06/29/2020    Uncontrolled type 2 diabetes mellitus with hyperglycemia (HCC) [E11.65]     CHF (congestive heart failure) ([facility] [I50.9]     Reactive depression [F32.9] 07/15/2016    Hypertension [I10]     Hyperlipidemia [E78.5]      SAÚL with a history of CKD following cardiac: Nephrology following, iron, epo ordered lasix changed to torsemide. SAÚL resolved    Hypertrophic cardiomyopathy: continue coreg BID, maximize imdur, continue clonidine 0.2 TID, verapamil Aldactone    Type 2 diabetes:lantus 30, humalg 6 TID, sliding scale insulin. Sepsis 2/2 E.coli UTI: resolved. Completed antibiotics    Hypertensive urgency: PRN IV labetalol as needed. Clonidine 0.2 TID, coreg 25 BID, imdur 30, verapamil, aldactone. Dark tarry stools with microcytic anemia: GI following. recent hx of push enteroscopy, colonoscopy, EGD, awaiting pill cam outpatient. Trend Hgb. Transfuse for hemoglobin less than 7    Depression: resume paxil. Likely sleep apnea: We will need outpatient PFTs for CPAP adjustment    DC planning- home tomorrow. Home care ordered    Additional work up or/and treatment plan may be added today or then after based on clinical progression. I am managing a portion of pt care.  Some medical issues are

## 2020-10-19 NOTE — PROGRESS NOTES
Physical Therapy Med Surg Daily Treatment Note  Facility/Department: [address]  Room: [place] [address]       NAME: Dell Grewal  : 1945 (60 y.o.)  MRN: [id number]  CODE STATUS: Full Code    Date of Service: 10/19/2020    Patient Diagnosis(es): Sepsis [facility]) [A41.9]   Chief Complaint   Patient presents with    Abdominal Pain     Patient Active Problem List    Diagnosis Date Noted    Cardiopulmonary arrest [facility])      Priority: High    Lumbosacral radiculopathy at L5 2015     Priority: High     Class: Acute    Spinal stenosis of lumbar region with neurogenic claudication 2015     Priority: High     Class: Chronic    High risk medication use-Norco - 17 OARRS PM&R, 18 OARRS PM&R, 18 OARRS PM&R, Urine Drug screen negative 17 PM&R--MED CONTRACT 2014     Priority: High    Gait abnormality 10/14/2020    Late effects of CVA (cerebrovascular accident) 10/14/2020    Sepsis ([address] 10/06/2020    Major depressive disorder in remission ([address] 10/06/2020    Gastroesophageal reflux disease without esophagitis 10/06/2020    Acute cystitis without hematuria 10/06/2020    CKD (chronic kidney disease) stage 4, GFR 15-29 ml/min ([address] 10/06/2020    Adenomatous polyp of sigmoid colon     Adenomatous polyp of transverse colon     Acute encephalopathy     Flash pulmonary edema ([address] 2020    Acute on chronic kidney failure ([address] 2020    Dysarthria     Chronic fatigue     Symptomatic anemia 2020    COPD exacerbation ([address] 2020    Anemia     AVM (arteriovenous malformation)     Gastrointestinal hemorrhage 2020    HOCM (hypertrophic obstructive cardiomyopathy) ([address] 2019    Hypoglycemia     SAÚL (acute kidney injury) ([address] 10/23/2019    Acute on chronic diastolic (congestive) heart failure ([address] 10/13/2019    Chronic obstructive pulmonary disease with acute exacerbation ([address] 10/12/2019    Hypertensive urgency 10/09/2019  Uncontrolled type 2 diabetes mellitus with hyperglycemia ([facility])     Acute on chronic diastolic heart failure ([address] 10/08/2019    CHF (congestive heart failure) ([facility])     PMB (postmenopausal bleeding)     Acute combined systolic and diastolic CHF, NYHA class 1 ([address] 03/24/2019    Osteoarthritis of spine with radiculopathy, lumbar region 11/07/2018    Myalgia 05/11/2018    Intercostal neuropathy 05/11/2018    Diabetic asymmetric polyneuropathy ([address] 04/11/2017    Cervical radicular pain 12/03/2016    Reactive depression 07/15/2016    Diabetic radiculopathy ([address] 07/15/2016    Insomnia secondary to chronic pain 04/15/2016    Non-compliant patient NO showed FU 1/10/17 Dr Read Needle 09/24/2015    Impaired mobility and activities of daily living 03/28/2015    Neck pain 03/04/2015    Artificial lens present 10/03/2014    Presbyopia 10/03/2014    Astigmatism, regular 10/03/2014    Cataract, nuclear sclerotic senile 10/03/2014    IDDM (insulin dependent diabetes mellitus) 10/03/2014    Regular astigmatism 10/03/2014    Nuclear senile cataract 10/03/2014    Memory deficit 06/04/2014    SOB (shortness of breath) on exertion 03/14/2014    Chest pain 03/14/2014    CTS (carpal tunnel syndrome) 02/09/2014    DJD (degenerative joint disease), lumbar 02/08/2014    Lumbosacral radiculopathy at S1 02/08/2014    DDD (degenerative disc disease), lumbar 02/08/2014    Dysphonia 02/08/2014    Insomnia 12/04/2013    Smoker 06/18/2013    Hypertension     Hyperlipidemia     Uncontrolled type 2 diabetes mellitus with complication, without long-term current use of insulin ([facility])     Fibromyalgia     Anxiety         Past Medical History:   Diagnosis Date    Anxiety     Asthma     dx 2019 / has smoked since age 14    CHF (congestive heart failure) ([facility])     Chronic back pain     Bilateral L5 S1 Radic on emg--surprisingly worse on the left than the right--pt's symptoms and her MRI show worse on the right    Chronic obstructive pulmonary disease with acute exacerbation (HCC) 10/12/2019    Depression     Fibromyalgia     Hyperlipidemia     meds > 8 yrs    Hypertension     meds > 45 yrs    On home O2     2l per n/c at bedtime mostly,     Osteoarthritis     Type II diabetes mellitus, uncontrolled ([facility]     hx > 8 yrs    Unspecified sleep apnea      Past Surgical History:   Procedure Laterality Date    BACK SURGERY  2017    lumbar disc    CARDIAC CATHETERIZATION  11/3/14     DR. MIRELES / no stents    COLONOSCOPY  08/29/2016    w/polypectomy     COLONOSCOPY N/A 9/29/2020    COLONOSCOPY WITH POLYPECTOMY performed by Leticia Danielson MD at [facility] N/A 10/7/2019    EUA HYSTEROSCOPY DILATATION AND CURETTAGE performed by Mark Nunes DO at [facility]. [address], COLON, DIAGNOSTIC      EYE SURGERY      [place] with IOL OU / [address] [place]  [address]    umbilical hernia repair    [place] ESOPHAGOGASTRODUODENOSCOPY TRANSORAL DIAGNOSTIC N/A 3/24/2017    EGD ESOPHAGOGASTRODUODENOSCOPY performed by Amilcar Rinaldi MD at [place] [address] 2/8/2018    negative findings    [place] REVISE MEDIAN N/CARPAL TUNNEL SURG Left 6/5/2017    LEFT  CARPAL [facility] N/A 2/8/2018    TONSILLECTOMY      as child    UPPER GASTROINTESTINAL ENDOSCOPY  08/26/2016    w/bx     UPPER GASTROINTESTINAL ENDOSCOPY N/A 2/25/2020    EGD possible biopsy performed by Leticia Danielson MD at [address] [place] [address] 7/1/2020    EGD PUSH ENTEROSCOPY performed by Elham Keita MD at [facility]       Restrictions  Restrictions/Precautions: Fall Risk;Contact Precautions(ESBL in urine)    SUBJECTIVE   General  Chart Reviewed: Yes  Family / Caregiver Present: Yes()  Subjective  Subjective: \"I'm pretty good today. \"    Pre-Session Pain Report  Pre Treatment Pain Screening  Pain at present: 3  Intervention List: Patient able to continue with treatment  Pain Screening  Patient Currently in Pain: Yes       Post-Session Pain Report  Pain Assessment  Pain Assessment: 0-10  Pain Level: 3  Pain Location: Rib cage  Pain Orientation: Left  Pain Descriptors: Aching         OBJECTIVE        Bed mobility  Bridging: Stand by assistance  Rolling to Left: Stand by assistance  Rolling to Right: Stand by assistance  Supine to Sit: Stand by assistance  Sit to Supine: Stand by assistance  Scooting: Stand by assistance  Comment: HOB flat, use of HR's, good technique    Transfers  Sit to Stand: Stand by assistance  Stand to sit: Stand by assistance  Comment: Vc's for hand placement initially, pt displays good follow through    Ambulation  Ambulation?: Yes  Ambulation 1  Surface: level tile  Device: Rolling Walker  Assistance: Stand by assistance  Quality of Gait: slight ff posture, wbos, decreased greta heel strike and foot clearance  Gait Deviations: Slow Bibi;Decreased step length;Decreased step height  Distance: 25'  Comments: vc's for proper technique with turning ww       Exercises  Straight Leg Raise: x2  Quad Sets: x5  Gluteal Sets: x5  Hip Abduction: x5  Ankle Pumps: x5  Comments: review HEP         ASSESSMENT   Assessment: Pt displays good safety and technique throughout tx with minimal vc's. HEP given and reviewed with pt to continue to strengthen BLE per pt request. Pt tends to get fatigued but SPO2 remains WNL post ambulation. Discharge Recommendations:  Continue to assess pending progress    Goals  Long term goals  Long term goal 1: Bed mobility with indep  Long term goal 2: Functional transfers with indep  Long term goal 3: Amb 50ft with LRAD and indep  Long term goal 4: 4 steps with handrail and SBA to enter/exit home  Patient Goals   Patient goals : \"return home\"    PLAN    Times per week: 3-6  Plan Comment: Cont.  POC  Safety Devices  Type of devices: All fall risk precautions in place, Bed alarm in place, Call light within reach, Left in bed     AMPA (6 CLICK) BASIC MOBILITY  AM-PAC Inpatient Mobility Raw Score : 15     Therapy Time   Individual   Time In 1018   Time Out 1033   Minutes 15      BM/Trsf: 5  Gait: 5  There ex: [address], PTA, 10/19/20 at 10:42 AM         Definitions for assistance levels  Independent = pt does not require any physical supervision or assistance from another person for activity completion. Device may be needed.   Stand by assistance = pt requires verbal cues or instructions from another person, close to but not touching, to perform the activity  Minimal assistance= pt performs 75% or more of the activity; assistance is required to complete the activity  Moderate assistance= pt performs 50% of the activity; assistance is required to complete the activity  Maximal assistance = pt performs 25% of the activity; assistance is required to complete the activity  Dependent = pt requires total physical assistance to accomplish the task

## 2020-10-19 NOTE — CARE COORDINATION
The LSW met with the patient and her . The patient verbally signed the IMM and expressed an understanding of the medicare appeal process. The patient's  states the discharge plan is home with [facility] and not to a SNF. The LSW discusssed that the patient is a probable discharge for today. The patient's  states he would prefer the patient discharge tomorrow as they are moving into a new home tomorrow. The hospitalist, the patient's RN and  were updated.  Electronically signed by MILAN Pyle on 10/19/20 at 11:53 AM EDT

## 2020-10-19 NOTE — PROGRESS NOTES
Comprehensive Nutrition Assessment    Type and Reason for Visit:  Reassess    Nutrition Recommendations/Plan:   Continue Carb Control 3 diet, replace low sodium with JENNI. Continue diabetic ONS TID    Nutrition Assessment:  Pt continues with poor oral intake with meals. Son stated she does not care for the selections on the hospital menu. She is taking herglucerna well, will continue to provide ONS at each meal    Malnutrition Assessment:  Malnutrition Status: At risk for malnutrition (Comment)    Context:  Acute Illness     Findings of the 6 clinical characteristics of malnutrition:  Energy Intake:  1 - 75% or less of estimated energy requirements for 7 or more days  Weight Loss:  Unable to assess     Body Fat Loss:  No significant body fat loss     Muscle Mass Loss:  No significant muscle mass loss    Fluid Accumulation:  Unable to assess     Strength:  Not Performed    Estimated Daily Nutrient Needs:  Energy (kcal):  9763-5632 (kg x 20-22); Weight Used for Energy Requirements:  Usual     Protein (g):  53-66 gm (kg IBW x 1.2-1.5); Weight Used for Protein Requirements:  Ideal        Fluid (ml/day):  8056-4023 (1 ml/kcal); Weight Used for Fluid Requirements:  Current      Nutrition Related Findings:  PMH- DB, COPD, CHF. Pt previously on vent 10/-10/9pm. trace-1+ Gen edema noted. labs reviewed, POC Gluc: 144-252, elevated BUN/CR, BM (10/17), Pt's dtr stated decreased intake pta x several months. Pt stated appetite decreased currently/agreeable to supplement. Wounds:  Stage II(L buttock documented 10/17)       Current Nutrition Therapies:    Dietary Nutrition Supplements: Diabetic Oral Supplement  DIET CARB CONTROL; Carb Control: 3 carb choices (45 gms)/meal; Low Sodium (2 GM);  Daily Fluid Restriction: 2000 ml    Anthropometric Measures:  · Height: 4' 11\" (149.9 cm)  · Current Body Weight: 140 lb (63.5 kg)(10/19 bedscale)   · Admission Body Weight: 134 lb (60.8 kg)(stated)    · Usual Body Weight: 133 lb (60.3 kg)([facility]24/[facility])     · Ideal Body Weight: 95 lbs; % Ideal Body Weight  > 100%  · BMI: 28.3  · BMI Categories: Obese Class 1 (BMI 30.0-34. 9)       Nutrition Diagnosis:   · Inadequate oral intake related to (current condition, s/p extubation) as evidenced by intake 0-25%(prev NPO)    Nutrition Interventions:   Food and/or Nutrient Delivery:  Modify Current Diet, Continue Oral Nutrition Supplement(Continue Carb Control 3 diet, replace low sodium with JENNI. Continue diabetic ONS TID)  Nutrition Education/Counseling:  No recommendation at this time   Coordination of Nutrition Care:  Continued Inpatient Monitoring    Goals:  New goal with po diet initiated. PO intake >50% of meals/supplement.        Nutrition Monitoring and Evaluation:   Food/Nutrient Intake Outcomes:  Food and Nutrient Intake, Supplement Intake  Physical Signs/Symptoms Outcomes:  Weight, Biochemical Data, Fluid Status or Edema     Electronically signed by Bar Redd RD, LD on 10/19/20 at 1:21 PM EDT

## 2020-10-19 NOTE — FLOWSHEET NOTE
Shift assessment complete. Patient denies cp, sob, n/v. Patient A&Ox4,VSS. LS diminished. BS active in all 4 quadrants. Patient denies c/o pain at this time. Patient resting in bed. Call light within reach. Will continue to monitor.

## 2020-10-19 NOTE — PROGRESS NOTES
Nephrology Progress Note    Assessment:  67y.o. year old female with history s/f T2DM, AMY, HTN, HLD, depression, fibromyalgia, chronic back pain, CHF, COPD and CKD stage III who presented for dysuria. Was being rx'd for UTI however pt suffered cardiac arrest (10/07). Has severe saúl that has slowly been improving     1. SAÚL on CKD: etiology unclear for initial worsening as not hypotensive, no contrast or nephrotoxic medications, likely however has contribution from cardiac arrest  2. CKD stage III: most likely in setting of T2DM/HTN, baseline Scr ~1.1-1.2 w/ eGFR mid/high 50s   3. Hyperkalemia 2/2 cardiac arrest, corrected w/ medical management  4. Cardiac arrest: CPR for ~1 hr then another ~15 mins  5. Bradycardia s/p pacemaker placement  6. Anemia with some contribution of kidneys. tsat 17%  7. Fluid overload on 40 mg iv lasix     Plan:  - on 8k retacrit weekly. Added a dose of iv iron on 10/17. Getting retacrit today. Will set up as outpt once seen (dont send out on this if going home)  - on coreg, clonidine, aldactone verapamil for bp.   Hold off on ace/arb  - was on lasix 40 iv daily - changed to torsemide 50  - ok for d/c from renal standpoint but will need close f/u    Patient Active Problem List:     Hypertension     Hyperlipidemia     Uncontrolled type 2 diabetes mellitus with complication, without long-term current use of insulin (HCC)     Fibromyalgia     Anxiety     Smoker     Insomnia     DJD (degenerative joint disease), lumbar     Lumbosacral radiculopathy at S1     DDD (degenerative disc disease), lumbar     Dysphonia     CTS (carpal tunnel syndrome)     High risk medication use-Norco - 12/20/17 O[facility]RS PM&R, 02/20/18 [facility] PM&R, 03/07/18 [facility] PM&R, Urine Drug screen negative 02/06/17 PM&R--MED CONTRACT 2/6/17     SOB (shortness of breath) on exertion     Chest pain     Memory deficit     Artificial lens present     Presbyopia     Astigmatism, regular     Cataract, nuclear sclerotic senile IDDM (insulin dependent diabetes mellitus)     Regular astigmatism     Nuclear senile cataract     Neck pain     Lumbosacral radiculopathy at L5     Spinal stenosis of lumbar region with neurogenic claudication     Impaired mobility and activities of daily living     Non-compliant patient NO showed FU 1/10/17 Dr Hurd Edgeworth     Insomnia secondary to chronic pain     Reactive depression     Diabetic radiculopathy (HCC)     Cervical radicular pain     Diabetic asymmetric polyneuropathy (HCC)     Myalgia     Intercostal neuropathy     Osteoarthritis of spine with radiculopathy, lumbar region     Acute combined systolic and diastolic CHF, NYHA class 1 (HCC)     PMB (postmenopausal bleeding)     Acute on chronic diastolic heart failure (HCC)     CHF (congestive heart failure) (HCC)     Hypertensive urgency     Uncontrolled type 2 diabetes mellitus with hyperglycemia (HCC)     Chronic obstructive pulmonary disease with acute exacerbation (HCC)     Acute on chronic diastolic (congestive) heart failure (HCC)     SAÚL (acute kidney injury) ([address]     Hypoglycemia     HOCM (hypertrophic obstructive cardiomyopathy) (formerly [facility])     Gastrointestinal hemorrhage     COPD exacerbation (HCC)     Anemia     AVM (arteriovenous malformation)     Symptomatic anemia     Flash pulmonary edema (HCC)     Acute on chronic kidney failure (HCC)     Dysarthria     Chronic fatigue     Acute encephalopathy     Adenomatous polyp of sigmoid colon     Adenomatous polyp of transverse colon     Sepsis (HCC)     Major depressive disorder in remission ([address]     Gastroesophageal reflux disease without esophagitis     Acute cystitis without hematuria     CKD (chronic kidney disease) stage 4, GFR 15-29 ml/min (formerly [facility])     Cardiopulmonary arrest ([address]      Subjective:  Admit Date: 10/5/2020    Interval History: renal function slowly improving, feels stronger. No fevers.   C/o chest pain she thinks is from compressions    Medications:  Scheduled Meds:   [START ON 10/20/2020] pantoprazole  40 mg Oral QAM AC    guaiFENesin  600 mg Oral BID    torsemide  50 mg Oral Daily    insulin glargine  30 Units Subcutaneous Lunch    epoetin dick-epbx  8,000 Units Subcutaneous Weekly    insulin lispro  6 Units Subcutaneous TID WC    cloNIDine  0.2 mg Oral TID    isosorbide mononitrate  60 mg Oral Daily    insulin lispro  0-12 Units Subcutaneous TID WC    insulin lispro  0-6 Units Subcutaneous Nightly    carvedilol  25 mg Oral BID WC    spironolactone  25 mg Oral Daily    sodium chloride  20 mL Intravenous Once    verapamil  120 mg Oral Nightly    sodium chloride flush  10 mL Intravenous 2 times per day    sodium chloride (PF)  10 mL Intravenous Daily    PARoxetine  40 mg Oral QAM    rosuvastatin  40 mg Oral QPM     Continuous Infusions:   dextrose      dextrose         CBC:   Recent Labs     10/18/20  0554 10/19/20  0547   WBC 12.3* 12.2*   HGB 8.2* 7.5*    370     CMP:    Recent Labs     10/17/20  0600 10/18/20  0552 10/19/20  0547   * 132* 135   K 4.5  4.5 4.2 4.4   CL 98 95 98   CO2 25 29 29   BUN 34* 30* 31*   CREATININE 3.17* 2.99* 2.91*   GLUCOSE 333* 113* 141*   CALCIUM 8.7 8.8 8.9   LABGLOM 14.2* 15.2* 15.7*     Troponin:   No results for input(s): TROPONINI in the last 72 hours. BNP: No results for input(s): BNP in the last 72 hours. INR:   No results for input(s): INR in the last 72 hours. Lipids:   No results for input(s): CHOL, LDLDIRECT, TRIG, HDL, AMYLASE, LIPASE in the last 72 hours. Liver:   Recent Labs     10/17/20  0600   AST 53*   *   ALKPHOS 202*   PROT 5.1*   LABALBU 2.7*   BILITOT <0.2     Iron:  No results for input(s): IRONS, FERRITIN in the last 72 hours. Invalid input(s): LABIRONS  Urinalysis: No results for input(s): UA in the last 72 hours.     Objective:  Vitals: BP (!) 148/59   Pulse 67   Temp 98.8 °F (37.1 °C) (Oral)   Resp 18   Ht 4' 11\" (1.499 m)   Wt 140 lb 14.4 oz (63.9 kg)   LMP  (LMP Unknown)   SpO2 100%   BMI 28.46 kg/m²    Wt Readings from Last 3 Encounters:   10/19/20 140 lb 14.4 oz (63.9 kg)   09/29/20 130 lb (59 kg)   08/24/20 130 lb (59 kg)      24HR INTAKE/OUTPUT:      Intake/Output Summary (Last 24 hours) at 10/19/2020 1149  Last data filed at 10/19/2020 0556  Gross per 24 hour   Intake 360 ml   Output --   Net 360 ml       General: weak, no acute distress  HEENT: normocephalic, atraumatic,  Lungs: somewhat diminished  Heart: regular rate and rhythm, no murmurs or rubs  Abdomen: soft, non-tender, non-distended  Ext: 1+ edema  Neuro: answering all questions    Electronically signed by Pastor Vito MD

## 2020-10-19 NOTE — PROGRESS NOTES
results for input(s): [place] in the last 72 hours. Urinalysis:   Lab Results   Component Value Date    NITRU Negative 10/18/2020    WBCUA 6-9 10/18/2020    BACTERIA Negative 10/18/2020    RBCUA 3-5 10/18/2020    BLOODU SMALL 10/18/2020    SPECGRAV 1.009 10/18/2020    GLUCOSEU Negative 10/18/2020       Radiology:   Most recent    Chest CT      WITH CONTRAST:No results found for this or any previous visit. WITHOUT CONTRAST:   Results for orders placed during the hospital encounter of 08/24/20   CT CHEST WO CONTRAST    Narrative EXAMINATION:  CT SCAN THE CHEST    CLINICAL HISTORY:  Short of breath    COMPARISON:  March 24, 2019    TECHNIQUE:  Multiple serial axial images of the chest from the base the neck through the upper abdomen with both sagittal coronal reconstruction was performed without intravenous or oral administration of contrast.    FINDINGS:    There is a patchy mosaic appearance lung parenchyma that can be seen with small airways disease. There is bibasilar areas of atelectasis, scarring. No focal infiltrates. No effusions no pneumothoraces. No significant periaortic adenopathy. There is pretracheal adenopathy. There is multilevel degenerative changes with osteophytes of the thoracic spine. Impression Unremarkable CT scan the chest as described above      All CT scans at this facility use dose modulation, iterative reconstruction, and/or weight based dosing when appropriate to reduce radiation dose to as low as reasonably achievable. Examination: CT ABDOMEN PELVIS WO CONTRAST, CT CHEST WO CONTRAST    Indication:   flank pain     Technique: Multiple serial axial images was performed through the abdomen and pelvis without intravenous or oral administration of contrast..   Images were reconstructed in the axial and coronal and sagittal planes. Comparison: September 9, 2019    Findings: The liver, gallbladder, spleen, pancreas, adrenals,  are unremarkable.     The kidneys show no significant perinephric stranding. No nephrolithiasis. No hydronephrosis or hydroureter. No bladder calculi. Large and small bowel show no sign of obstruction. The appendix is not visualized. No pericecal stranding. No diverticulitis. No free air. No free fluid. The visualized abdominal aorta is of normal size and caliber. No significant retroperitoneal adenopathy. There is multilevel degenerative changes of lumbar spine. There is a grade 1 anterolisthesis of L4 and L5. There is narrowing of the L5-S1 disc space. Impression: UNREMARKABLE CT SCAN OF THE ABDOMEN AND PELVIS AS DESCRIBED ABOVE      All CT scans at this facility use dose modulation, iterative reconstruction, and/or weight based dosing when appropriate to reduce radiation dose to as low as reasonably achievable. CXR      2-view:   Results for orders placed during the hospital encounter of 08/24/20   XR CHEST (2 VW)    Narrative EXAMINATION: XR CHEST (2 VW)     CLINICAL HISTORY:  sob     COMPARISONS: None     FINDINGS:    Two views of the chest are submitted. The cardiac silhouette is enlarged  Pulmonary vascular congestion with increased interstitial markings. Right sided trachea. No focal infiltrates. No effusions. No Pneumothoraces. Impression PULMONARY VASCULAR CONGESTION WITH INCREASED INTERSTITIAL MARKINGS. RADIOGRAPHIC FINDINGS COULD SUGGEST EARLY CHF. CHF. CORRELATE CLINICALLY        Portable:   Results for orders placed during the hospital encounter of 10/05/20   XR CHEST PORTABLE    Narrative EXAMINATION: XR CHEST PORTABLE. DATE AND TIME:10/9/2020 5:00 AM     CLINICAL HISTORY: SHORTNESS OF BREATH      COMPARISONS: OCTOBER 8, 2020     FINDINGS: ET tube and nasogastric tube remain in satisfactory position. Persistent to increased basilar markings bilaterally with no significant change.       Impression NO CHANGE Echo No results found for this or any previous visit. Assessment/Plan:    Active Hospital Problems    Diagnosis Date Noted    Cardiopulmonary arrest [facility]) [I46.9]      Priority: High    Lumbosacral radiculopathy at L5 [M54.17] 03/16/2015     Priority: High     Class: Acute    Gait abnormality [R26.9] 10/14/2020    Late effects of CVA (cerebrovascular accident) [I69.90] 10/14/2020    Sepsis ([facility] [A41.9] 10/06/2020    Major depressive disorder in remission ([facility] [F32.5] 10/06/2020    Gastroesophageal reflux disease without esophagitis [K21.9] 10/06/2020    Acute cystitis without hematuria [N30.00] 10/06/2020    CKD (chronic kidney disease) stage 4, GFR 15-29 ml/min ([facility] [N18.4] 10/06/2020    COPD exacerbation [facility] [J44.1] 06/29/2020    Uncontrolled type 2 diabetes mellitus with hyperglycemia (HCC) [E11.65]     CHF (congestive heart failure) ([facility] [I50.9]     Reactive depression [F32.9] 07/15/2016    Hypertension [I10]     Hyperlipidemia [E78.5]      SAÚL with a history of CKD following cardiac: Nephrology following, iron, epo ordered lasix changed to torsemide. Hypertrophic cardiomyopathy: continue coreg BID, maximize imdur, continue clonidine 0.2 TID, verapamil Aldactone    Type 2 diabetes:lantus 30, humalg 6 TID, sliding scale insulin. Sepsis 2/2 E.coli UTI: resolved. Completed antibiotics    Hypertensive urgency: PRN IV labetalol as needed. Clonidine 0.2 TID, coreg 25 BID, imdur 30, verapamil, aldactone. Dark tarry stools with microcytic anemia: GI following. recent hx of push enteroscopy, colonoscopy, EGD, awaiting pill cam outpatient. Trend Hgb. Transfuse for hemoglobin less than 7    Depression: resume paxil. Likely sleep apnea: We will need outpatient PFTs for CPAP adjustment    DC planning to SNF once pre cert is obtained    Additional work up or/and treatment plan may be added today or then after based on clinical progression.  I am managing a portion of pt care. Some medical issues are handled byother specialists. Additional work up and treatment should be done in out pt setting by pt PCP and other out pt providers. In addition to examining and evaluating pt, I spent additional time explaining care, normaland abnormal findings, and treatment plan. All of pt questions were answered. Counseling, diet and education were provided. Case will be discussed with nursing staff when appropriate. Family will be updated if and whenappropriate.       Electronically signed by Castillo Armijo DO on 10/18/2020 at 9:36 PM

## 2020-10-19 NOTE — FLOWSHEET NOTE
4789 am assessment completed. Awake and resting in bed. Tearful and restless. Complaints of generalized rib pain, increased with pressure. Medicated with prn tylenol and atarax. Breakfast completed. Reassurance given. Spouse at bedside. Chart and meds reviewed. Electronically signed by Patricia Allen RN on 10/19/2020 at 10:11 AM     [phone] awakened for lunch. Chart reviewed. Insulin given per order. Electronically signed by Patricia Allen RN on 10/19/2020 at 4:08 PM    [phone] medicated with prn tylenol and atarax for complaints of pain and anxiety. Physician updated. See new orders for lidocaine patch. Electronically signed by Patricia Allen RN on 10/19/2020 at 4:09 PM    1546 resting quietly. No complaints at this time. Electronically signed by Patricia Allen RN on 10/19/2020 at 6:49 PM    [phone] chart and meds reviewed. SC retacrit per order.  Electronically signed by Patricia Allen RN on 10/19/2020 at 6:50 PM

## 2020-10-19 NOTE — PROGRESS NOTES
Subjective: The patient complains of severe  acute on chronic progressive weakness and confusion partially relieved by rest, PT, transfusion and exacerbated by prep of her H&H and need for transfusion. I am concerned about patients anxiety and confusion -  Reviewed recent nursing note, \" Awake and resting in bed. Tearful and restless. Complaints of generalized rib pain, increased with pressure. Medicated with prn tylenol and atarax. Breakfast completed. Reassurance given. Spouse at bedside. \".  Spouse is asking for discharge to be put back until tomorrow because he is moving to a new house today and its going to be almost logistically impossible for him to assist her in discharge. She is feeling much better today has more energy is in a larger room her 's and visiting she is eating breakfast she is eager for rehabilitation. Patient is to follow-up with me in approximately 1 month regarding her pain and schedule trigger point injections. ROS x10: The patient also complains of severely impaired mobility and activities of daily living. Otherwise no new problems with vision, hearing, nose, mouth, throat, dermal, cardiovascular, GI, , pulmonary, musculoskeletal, psychiatric or neurological. See Rehab consult on Rehab chart . Vital signs:  BP (!) 148/59   Pulse 67   Temp 98.8 °F (37.1 °C) (Oral)   Resp 18   Ht 4' 11\" (1.499 m)   Wt 140 lb 14.4 oz (63.9 kg)   LMP  (LMP Unknown)   SpO2 100%   BMI 28.46 kg/m²   I/O:   PO/Intake:    fair PO intake, no problems observed or reported. Bowel/Bladder:  continent, no problems noted. General:  Patient is well developed, adequately nourished, non-obese and     well kempt. HEENT:    PERRLA, hearing intact to loud voice, external inspection of ear     and nose benign. Inspection of lips, tongue and gums benign  Musculoskeletal: No significant change in strength or tone. All joints stable.       Inspection and palpation of digits and nails show no clubbing,       cyanosis or inflammatory conditions. Neuro/Psychiatric: Affect: flat but pleasant. Alert and oriented to self and     situation. No significant change in deep tendon reflexes or     sensation  Lungs:  Diminished, CTA-B. Respiration effort is normal at rest.     Heart:   S1 = S2,  RRR. No loud murmurs. Abdomen:  Soft, non-tender, no enlargement of liver or spleen. Extremities:  No significant lower extremity edema or tenderness. Skin:    BUE bruises dt blood draws, no visualized or palpated problems. Rehabilitation:  Physical therapy: FIMS:  Bed Mobility: Scooting: Maximal assistance    Transfers: Sit to Stand: Stand by assistance  Stand to sit: Stand by assistance, Ambulation 1  Surface: level tile  Device: Rolling Walker  Assistance: Stand by assistance  Quality of Gait: FF posture, shortened step length and steady pace. vc's for safety with pacing in tight spaces. Gait Deviations: Decreased step length, Decreased step height  Distance: 20'x2  Comments: Poor posture, fwd flexed trunk, rapid fatigue.,      FIMS:  ,  , Assessment: Pt ambulated with WW with SBA. Pt slightly impulsive and quick paced in tight spaces. Cueing for safety with good follow through.     Occupational therapy: FIMS:   ,  ,      Speech therapy: FIMS:        Lab/X-ray studies reviewed, analyzed and discussed with patient and staff:   Recent Results (from the past 24 hour(s))   POCT Glucose    Collection Time: 10/18/20 11:10 AM   Result Value Ref Range    POC Glucose 144 (H) 60 - 115 mg/dl    Performed on ACCU-CHEK    POCT Glucose    Collection Time: 10/18/20  4:06 PM   Result Value Ref Range    POC Glucose 135 (H) 60 - 115 mg/dl    Performed on ACCU-CHEK    Urinalysis    Collection Time: 10/18/20  4:16 PM   Result Value Ref Range    Color, UA Yellow Straw/Yellow    Clarity, UA Clear Clear    Glucose, Ur Negative Negative mg/dL    Bilirubin Urine Negative Negative    Ketones, Urine Negative Negative mg/dL    Specific Gravity, UA 1.009 1.005 - 1.030    Blood, Urine SMALL (A) Negative    pH, UA 7.0 5.0 - 9.0    Protein, UA 30 (A) Negative mg/dL    Urobilinogen, Urine 0.2 <2.0 E.U./dL    Nitrite, Urine Negative Negative    Leukocyte Esterase, Urine TRACE (A) Negative   Microscopic Urinalysis    Collection Time: 10/18/20  4:16 PM   Result Value Ref Range    Bacteria, UA Negative Negative /HPF    Hyaline Casts, UA 0-1 0 - 5 /HPF    WBC, UA 6-9 (A) 0 - 5 /HPF    RBC, UA 3-5 (A) 0 - 5 /HPF    Epithelial Cells, UA 3-5 0 - 5 /HPF   POCT Glucose    Collection Time: 10/18/20  7:53 PM   Result Value Ref Range    POC Glucose 235 (H) 60 - 115 mg/dl    Performed on ACCU-CHEK    CBC    Collection Time: 10/19/20  5:47 AM   Result Value Ref Range    WBC 12.2 (H) 4.8 - 10.8 K/uL    RBC 2.77 (L) 4.20 - 5.40 M/uL    Hemoglobin 7.5 (L) 12.0 - 16.0 g/dL    Hematocrit 23.0 (L) 37.0 - 47.0 %    MCV 83.1 82.0 - 100.0 fL    MCH 27.1 27.0 - 31.3 pg    MCHC 32.6 (L) 33.0 - 37.0 %    RDW 22.9 (H) 11.5 - 14.5 %    Platelets 749 134 - 476 K/uL   Basic Metabolic Panel w/ Reflex to MG    Collection Time: 10/19/20  5:47 AM   Result Value Ref Range    Sodium 135 135 - 144 mEq/L    Potassium reflex Magnesium 4.4 3.4 - 4.9 mEq/L    Chloride 98 95 - 107 mEq/L    CO2 29 20 - 31 mEq/L    Anion Gap 8 (L) 9 - 15 mEq/L    Glucose 141 (H) 70 - 99 mg/dL    BUN 31 (H) 8 - 23 mg/dL    CREATININE 2.91 (H) 0.50 - 0.90 mg/dL    GFR Non-African American 15.7 (L) >60    GFR  19.0 (L) >60    Calcium 8.9 8.5 - 9.9 mg/dL   POCT Glucose    Collection Time: 10/19/20  5:55 AM   Result Value Ref Range    POC Glucose 252 (H) 60 - 115 mg/dl    Performed on ACCU-CHEK        Previous extensive, complex labs, notes and diagnostics reviewed and analyzed.      ALLERGIES:    Allergies as of 10/05/2020 - In progress 10/05/2020   Allergen Reaction Noted    Ibuprofen Nausea Only 11/25/2014    Metformin and related  01/29/2014    Darvon [propoxyphene hcl] Nausea And Vomiting 09/07/2012      (please also verify by checking [facility])     Complex Physical Medicine & Rehab Issues Assess & Plan:   1. Severe abnormality of gait and mobility and impaired self-care and ADL's secondary to encephalopathy status post cerebrovascular disease and cardiac arrest..  Functional and medical status reassessed regarding patients ability to participate in therapies and patient found to be able to participate in  acute intensive comprehensive inpatient rehabilitation program including PT/OT to improve balance, ambulation, ADLs, and to improve the P/AROM. It is my opinion that they will be able to tolerate 3 hours of therapy a day and benefit from it at an acute level. 2. Bowel constipation and Bladder dysfunction overactive bladder:  frequent toileting, ambulate to bathroom with assistance, check post void residuals. Check for C.difficile x1 if >2 loose stools in 24 hours, continue bowel & bladder program.  Monitor for UTI symptoms including lethargy and confusion  3. Severe with her chronic mid and low back pain flared by recent cardiac arrest with resuscitation efforts and generalized OA pain: reassess pain every shift and prior to and after each therapy session, give prn  Tylenol, modalities prn in therapy, consider Lidoderm, K-pad prn. Patient okay to go home with a prescription of Ultram and then follow-up in my office for her chronic pain. 4. Skin breakdown risk:  continue pressure relief program.  Daily skin exams and reports from nursing. 5. Severe fatigue due to immobility and nutritional deficits: Add vitamin B12 vitamin D and CoQ10 titrate dosing and add protein supplementation with low carb content. 6. Complex discharge planning:   Denied by insurance denied on [id number] therefore patient most likely will go home with home health care. Complex Active General Medical Issues that complicate care Assess & Plan:     1.  Principal Problem:    Sepsis ([facility] [address]  Active Problems: Lumbosacral radiculopathy at L5    Cardiopulmonary arrest (HCC)    Hypertension    Hyperlipidemia    Reactive depression    CHF (congestive heart failure) ([facility] [address]    Uncontrolled type 2 diabetes mellitus with hyperglycemia (HCC)    COPD exacerbation ([facility])    Major depressive disorder in remission ([facility]    Gastroesophageal reflux disease without esophagitis    Acute cystitis without hematuria    CKD (chronic kidney disease) stage 4, GFR 15-29 ml/min ([facility])    Gait abnormality    Late effects of CVA (cerebrovascular accident)  Resolved Problems:    * No resolved hospital problems.  George Rios D.O., PM&R     Attending    [address]

## 2020-10-19 NOTE — TELEPHONE ENCOUNTER
Haily Edmondson from [facility] called asking if you will follow patient for home health care.   Thank you

## 2020-10-20 VITALS
SYSTOLIC BLOOD PRESSURE: 125 MMHG | WEIGHT: 146.1 LBS | OXYGEN SATURATION: 100 % | HEIGHT: 59 IN | BODY MASS INDEX: 29.45 KG/M2 | DIASTOLIC BLOOD PRESSURE: 47 MMHG | HEART RATE: 68 BPM | TEMPERATURE: 97.8 F | RESPIRATION RATE: 18 BRPM

## 2020-10-20 LAB
GLUCOSE BLD-MCNC: 165 MG/DL (ref 60–115)
GLUCOSE BLD-MCNC: 273 MG/DL (ref 60–115)
PERFORMED ON: ABNORMAL
PERFORMED ON: ABNORMAL

## 2020-10-20 PROCEDURE — 6370000000 HC RX 637 (ALT 250 FOR IP): Performed by: INTERNAL MEDICINE

## 2020-10-20 RX ORDER — LIDOCAINE 4 G/G
1 PATCH TOPICAL DAILY
Qty: 1 BOX | Refills: 1 | Status: SHIPPED | OUTPATIENT
Start: 2020-10-21 | End: 2021-01-15

## 2020-10-20 RX ORDER — ISOSORBIDE MONONITRATE 60 MG/1
60 TABLET, EXTENDED RELEASE ORAL DAILY
Qty: 30 TABLET | Refills: 3 | Status: ON HOLD | OUTPATIENT
Start: 2020-10-20 | End: 2021-04-02 | Stop reason: HOSPADM

## 2020-10-20 RX ORDER — CEPHALEXIN 500 MG/1
500 CAPSULE ORAL 2 TIMES DAILY
Qty: 14 CAPSULE | Refills: 0 | Status: SHIPPED | OUTPATIENT
Start: 2020-10-20 | End: 2020-10-27

## 2020-10-20 RX ORDER — INSULIN GLARGINE 100 [IU]/ML
INJECTION, SOLUTION SUBCUTANEOUS
Qty: 3 VIAL | Refills: 3 | COMMUNITY
Start: 2020-10-20 | End: 2020-12-09 | Stop reason: SDUPTHER

## 2020-10-20 RX ORDER — CLONIDINE HYDROCHLORIDE 0.2 MG/1
0.2 TABLET ORAL 2 TIMES DAILY
Qty: 60 TABLET | Refills: 3 | Status: SHIPPED | OUTPATIENT
Start: 2020-10-20 | End: 2021-03-11 | Stop reason: SDUPTHER

## 2020-10-20 RX ORDER — SPIRONOLACTONE 25 MG/1
25 TABLET ORAL DAILY
Qty: 30 TABLET | Refills: 3 | Status: ON HOLD | OUTPATIENT
Start: 2020-10-20 | End: 2021-04-02 | Stop reason: HOSPADM

## 2020-10-20 RX ORDER — TORSEMIDE 100 MG/1
50 TABLET ORAL DAILY
Qty: 30 TABLET | Refills: 3 | Status: SHIPPED | OUTPATIENT
Start: 2020-10-20 | End: 2021-02-08 | Stop reason: SDUPTHER

## 2020-10-20 RX ORDER — TRAMADOL HYDROCHLORIDE 50 MG/1
50 TABLET ORAL EVERY 12 HOURS PRN
Qty: 10 TABLET | Refills: 0 | Status: SHIPPED | OUTPATIENT
Start: 2020-10-20 | End: 2020-10-26 | Stop reason: SDUPTHER

## 2020-10-20 RX ORDER — CARVEDILOL 25 MG/1
25 TABLET ORAL 2 TIMES DAILY
Qty: 60 TABLET | Refills: 3 | Status: ON HOLD | COMMUNITY
Start: 2020-10-20 | End: 2021-04-02 | Stop reason: HOSPADM

## 2020-10-20 RX ADMIN — CARVEDILOL 25 MG: 25 TABLET, FILM COATED ORAL at 10:06

## 2020-10-20 RX ADMIN — PANTOPRAZOLE SODIUM 40 MG: 40 TABLET, DELAYED RELEASE ORAL at 06:29

## 2020-10-20 RX ADMIN — GUAIFENESIN 600 MG: 600 TABLET ORAL at 10:06

## 2020-10-20 RX ADMIN — SPIRONOLACTONE 25 MG: 25 TABLET, FILM COATED ORAL at 10:06

## 2020-10-20 RX ADMIN — TRAMADOL HYDROCHLORIDE 50 MG: 50 TABLET, FILM COATED ORAL at 10:43

## 2020-10-20 RX ADMIN — PAROXETINE HYDROCHLORIDE 40 MG: 20 TABLET, FILM COATED ORAL at 10:06

## 2020-10-20 RX ADMIN — TORSEMIDE 50 MG: 10 TABLET ORAL at 10:06

## 2020-10-20 RX ADMIN — CLONIDINE HYDROCHLORIDE 0.2 MG: 0.1 TABLET ORAL at 10:06

## 2020-10-20 RX ADMIN — ISOSORBIDE MONONITRATE 60 MG: 60 TABLET, EXTENDED RELEASE ORAL at 10:07

## 2020-10-20 ASSESSMENT — PAIN SCALES - GENERAL: PAINLEVEL_OUTOF10: 4

## 2020-10-20 NOTE — FLOWSHEET NOTE
Nurse went in room to pass meds and pt stated she was very wet. Pt stated she had been calling for help but no one came. Nurse had no missed calls nor did pca. Pt bathed and clean depends applied. Nurse spoke with pts  earlier this am and agreed to 1pm pickup. Pt is aware.

## 2020-10-20 NOTE — DISCHARGE INSTR - COC
Continuity of Care Form    Patient Name: Wendall Nyhan   :  1945  MRN:  [id number]    Admit date:  10/5/2020  Discharge date:  10/20/2020    Code Status Order: Full Code   Advance Directives:   [facility] Documentation     Date/Time Healthcare Directive Type of Healthcare Directive Copy in [facility] [address] Agent's Name Healthcare Agent's Phone Number    10/06/20 6749  No, patient does not have an advance directive for healthcare treatment -- -- -- -- --          Admitting Physician:  Paul Sotelo DO  PCP: Norma Trevizo MD    Discharging Nurse: mallika  [address] Unit/Room#: [address] [facility] Unit Phone Number: [phone]    Emergency Contact:   Extended Emergency Contact Information  Primary Emergency Contact: Delano Villanueva  Address: [address] [place] [address] [address] [place]oy, [address] [address] Phone: [phone]  Work Phone: [phone]  Mobile Phone: [phone]  Relation: Spouse  Secondary Emergency Contact: [address] Phone: [phone]  Relation: Child    Past Surgical History:  Past Surgical History:   Procedure Laterality Date    BACK SURGERY  2017    lumbar disc    CARDIAC CATHETERIZATION  11/3/14     DR. MIRELES / no stents    COLONOSCOPY  2016    w/polypectomy     COLONOSCOPY N/A 2020    COLONOSCOPY WITH POLYPECTOMY performed by Mesfin Dacosta MD at [facility] N/A 10/7/2019    EUA HYSTEROSCOPY DILATATION AND CURETTAGE performed by Shira Amaya DO at [facility]. [address], COLON, DIAGNOSTIC      EYE SURGERY      [place] with IOL OU / [address]    umbilical hernia repair    [place] ESOPHAGOGASTRODUODENOSCOPY TRANSORAL DIAGNOSTIC N/A 3/24/2017    EGD ESOPHAGOGASTRODUODENOSCOPY performed by Melida Tucker MD at [place] [id number] 2018    negative findings    [place] REVISE MEDIAN N/CARPAL TUNNEL SURG Left 6/5/2017    LEFT  CARPAL TUNNEL RELEASE performed by Obdulia Stewart MD at [facility],8+H/P,QGVOK N/A 2/8/2018    TONSILLECTOMY      as child    UPPER GASTROINTESTINAL ENDOSCOPY  08/26/2016    w/bx     UPPER GASTROINTESTINAL ENDOSCOPY N/A 2/25/2020    EGD possible biopsy performed by Fercho Gonsalez MD at [address] [place] [address] N/A 7/1/2020    EGD PUSH ENTEROSCOPY performed by Everett Nobles MD at [facility]       Immunization History:   Immunization History   Administered Date(s) Administered    Influenza Vaccine, unspecified formulation 09/11/2015    Influenza, High Dose (Fluzone 65 yrs and older) 10/12/2016, 10/23/2017    Influenza, Quadv, IM, PF (6 mo and older Fluzone, Flulaval, Fluarix, and 3 yrs and older Afluria) 10/16/2019    Pneumococcal Conjugate 13-valent ([id number]) 10/23/2017    Pneumococcal Polysaccharide ([id number]) 11/01/2010    Td (Adult), 5 Lf Tetanus Toxoid, Pf (Tenivac, Decavac) 01/20/2011    Td, unspecified formulation 01/20/2011       Active Problems:  Patient Active Problem List   Diagnosis Code    Hypertension I10    Hyperlipidemia E78.5    Uncontrolled type 2 diabetes mellitus with complication, without long-term current use of insulin (HCC) E11.8, E11.65    Fibromyalgia M79.7    Anxiety F41.9    Smoker F17.200    Insomnia G47.00    DJD (degenerative joint disease), lumbar M47.816    Lumbosacral radiculopathy at S1 M54.17    DDD (degenerative disc disease), lumbar M51.36    Dysphonia R49.0    CTS (carpal tunnel syndrome) G56.00    High risk medication use-Norco - 12/20/17 [facility] PM&R, 02/20/18 [facility] PM&R, 03/07/18 [facility] PM&R, Urine Drug screen negative 02/06/17 PM&R--MED CONTRACT 2/6/17 Z79.899    SOB (shortness of breath) on exertion R06.02    Chest pain R07.9    Memory deficit R41.3    Artificial lens present Z96.1    Presbyopia H52.4    Astigmatism, regular H52.229    Cataract, nuclear sclerotic senile H25.10    IDDM (insulin dependent diabetes mellitus) [id number]    Regular astigmatism H52.229    Nuclear senile cataract H25.10    Neck pain M54.2    Lumbosacral radiculopathy at L5 M54.17    Spinal stenosis of lumbar region with neurogenic claudication M48.062    Impaired mobility and activities of daily living Z74.09, Z78.9    Non-compliant patient NO showed FU 1/10/17 Dr Buckner [place] Z91.19    Insomnia secondary to chronic pain G89.29, G47.01    Reactive depression F32.9    Diabetic radiculopathy (HCC) E11.49, M54.10    Cervical radicular pain M54.12    Diabetic asymmetric polyneuropathy ([facility]) E11.42    Myalgia M79.10    Intercostal neuropathy G58.0    Osteoarthritis of spine with radiculopathy, lumbar region M47.26    Acute combined systolic and diastolic CHF, NYHA class 1 ([facility]) I50.41    PMB (postmenopausal bleeding) N95.0    Acute on chronic diastolic heart failure ([facility]) I50.33    CHF (congestive heart failure) ([facility]) I50.9    Hypertensive urgency I16.0    Uncontrolled type 2 diabetes mellitus with hyperglycemia ([facility]) E11.65    Chronic obstructive pulmonary disease with acute exacerbation ([facility]) J44.1    Acute on chronic diastolic (congestive) heart failure ([facility]) I50.33    SAÚL (acute kidney injury) ([facility]) N17.9    Hypoglycemia E16.2    HOCM (hypertrophic obstructive cardiomyopathy) ([facility]) I42.1    Gastrointestinal hemorrhage K92.2    COPD exacerbation ([facility]) J44.1    Anemia D64.9    AVM (arteriovenous malformation) Q27.30    Symptomatic anemia D64.9    Flash pulmonary edema ([facility]) J81.0    Acute on chronic kidney failure ([facility]) N17.9, N18.9    Dysarthria R47.1    Chronic fatigue R53.82    Acute encephalopathy G93.40    Adenomatous polyp of sigmoid colon D12.5    Adenomatous polyp of transverse colon D12.3    Sepsis ([facility]) A41.9    Major depressive disorder in remission ([facility]) F32.5    Gastroesophageal reflux disease without esophagitis K21.9    Acute cystitis without hematuria N30.00    CKD (chronic kidney disease) stage 4, GFR 15-29 ml/min ([facility]) N18.4    Cardiopulmonary arrest ([facility]) I46.9    Gait abnormality R26.9    Late effects of CVA (cerebrovascular accident) I69.90       Isolation/Infection:   Isolation          Contact        Patient Infection Status     Infection Onset Added Last Indicated Last Indicated By Review Planned Expiration Resolved Resolved By    ESBL (Extended Spectrum Beta Lactamase) 09/27/19 09/30/19 10/23/19 Urine Culture        E. Coli ESBL urine on 10/23/2019. Electronically signed by Daniel Johnson RN on 10/28/2019 at 8:48 AM     E. Coli ESBL urine on 9/27/2019. Electronically signed by Daniel Johnson RN on 10/1/2019 at 8:53 AM     Resolved    COVID-19 Rule Out 06/30/20 06/30/20 06/30/20 COVID-19 (Ordered)   06/30/20 Rule-Out Test Resulted    COVID-19 Rule Out 06/29/20 06/29/20 06/29/20 COVID-19 (Ordered)   06/29/20 Rule-Out Test Resulted    C-diff Rule Out  12/13/19 12/13/19 Clostridium Difficile Toxin/Antigen (Ordered)   12/14/19 Rule-Out Test Resulted          Nurse Assessment:  Last Vital Signs: BP (!) 125/47   Pulse 68   Temp 97.8 °F (36.6 °C) (Oral)   Resp 18   Ht 4' 11\" (1.499 m)   Wt 146 lb 1.6 oz (66.3 kg)   LMP  (LMP Unknown)   SpO2 100%   BMI 29.51 kg/m²     Last documented pain score (0-10 scale): Pain Level: 4  Last Weight:   Wt Readings from Last 1 Encounters:   10/20/20 146 lb 1.6 oz (66.3 kg)     Mental Status:  oriented, alert and forgetful?     IV Access:  - None    Nursing Mobility/ADLs:  Walking   Assisted  Transfer  Assisted  Bathing  Assisted  Dressing  Assisted  Toileting  Assisted  Feeding  Independent  Med Admin  Assisted  Med Delivery   whole    Wound Care Documentation and Therapy:  Wound 10/17/20 Buttocks Left (Active)   Dressing Status Other (Comment) 10/19/20 2210   Wound Cleansed Soap and water 10/20/20 1030   Dressing/Treatment Zinc paste 10/20/20 1030   Drainage Amount None 10/19/20 2210   Odor None 10/19/20 2210   Number of days: 3        Elimination:  Continence:   · Bowel: not witnessed  · Bladder: No  Urinary Catheter: None   Colostomy/Ileostomy/Ileal Conduit: No       Date of Last BM: ***    Intake/Output Summary (Last 24 hours) at 10/20/2020 1155  Last data filed at 10/19/2020 1611  Gross per 24 hour   Intake 480 ml   Output --   Net 480 ml     I/O last 3 completed shifts: In: 5 [P.O.:720]  Out: -     Safety Concerns: At Risk for Falls    Impairments/Disabilities:      Vision and Hearing    Nutrition Therapy:  Current Nutrition Therapy:   - carb control diet with 3-4 grams of salt    Routes of Feeding: Oral  Liquids: No Restrictions  Daily Fluid Restriction: yes - amount 2 liters  Last Modified Barium Swallow with Video (Video Swallowing Test): not done    Treatments at the Time of Hospital Discharge:   Respiratory Treatments: ***  Oxygen Therapy:  is on oxygen at home dose L/min per nasal cannula.   Ventilator:    - No ventilator support    Rehab Therapies: Physical Therapy and Occupational Therapy  Weight Bearing Status/Restrictions: No weight bearing restirctions  Other Medical Equipment (for information only, NOT a DME order):  walker  Other Treatments: ***    Patient's personal belongings (please select all that are sent with patient):  {[facility] DME Belongings:336964773}    RN SIGNATURE:  Electronically signed by Tiara Adhikari RN on 10/20/20 at 12:48 PM EDT    CASE MANAGEMENT/SOCIAL WORK SECTION    Inpatient Status Date: ***    Readmission Risk Assessment Score:  Readmission Risk              Risk of Unplanned Readmission:        37           Discharging to Facility/ Agency   · Name:   · Address:  · Phone:  · Fax:    Dialysis Facility (if applicable)   · Name:  · Address:  · Dialysis Schedule:  · Phone:  · Fax:    / signature: {Esignature:[id number]}     GAUTAM [address] - [phone]    PHYSICIAN SECTION    Prognosis: {Prognosis:5884448997}    Condition at Discharge: [facility] Patient Condition:[id number]}    Rehab Potential (if transferring to Rehab): {Prognosis:7885267503}    Recommended Labs or Other Treatments After Discharge: ***    Physician Certification: I certify the above information and transfer of Clearnce Snowball  is necessary for the continuing treatment of the diagnosis listed and that she requires {Admit to Appropriate Level of Care:43053} for {GREATER/LESS:058513958} 30 days.      Update Admission H&P: {CHP DME Changes in XDKDV:992937248}    PHYSICIAN SIGNATURE:  {Esignature:[id number]}

## 2020-10-20 NOTE — DISCHARGE SUMMARY
[facility] Medicine Discharge Summary    Edel Camacho  :  1945  MRN:  [id number]    Admit date:  10/5/2020  Discharge date:  10/20/2020    Admitting Physician:  Noelle Marcelo DO  Primary Care Physician:  Erick Malone MD    Discharge Diagnoses:    Principal Problem:    Sepsis [facility])  Active Problems:    Lumbosacral radiculopathy at L5    Cardiopulmonary arrest ([facility]    Hypertension    Hyperlipidemia    Reactive depression    CHF (congestive heart failure) ([facility]    Uncontrolled type 2 diabetes mellitus with hyperglycemia [facility]    COPD exacerbation ([facility]    Major depressive disorder in remission ([facility]    Gastroesophageal reflux disease without esophagitis    Acute cystitis without hematuria    CKD (chronic kidney disease) stage 4, GFR 15-29 ml/min ([facility])    Gait abnormality    Late effects of CVA (cerebrovascular accident)  Resolved Problems:    * No resolved hospital problems. *    Chief Complaint   Patient presents with    Abdominal Pain       Condition: improved   Activity: no strenuous activity   Diet: diabetic  Disposition: home with home care  Functional Status: ambulatory with assistance    Significant Findings:     Recent Labs     10/19/20  0547 10/18/20  0554 10/17/20  0600   WBC 12.2* 12.3* 14.5*   HGB 7.5* 8.2* 8.1*   HCT 23.0* 24.7* 24.7*   MCV 83.1 82.4 83.2    272 231     Labs Renal Latest Ref Rng & Units 10/19/2020 10/18/2020 10/17/2020 10/17/2020 10/16/2020   BUN 8 - 23 mg/dL 31(H) 30(H) 34(H) - 35(H)   Cr 0.50 - 0.90 mg/dL 2.91(H) 2.99(H) 3.17(H) - 3.24(H)   K 3.4 - 4.9 mEq/L 4.4 4.2 4.5 4.5 4.2   Na 135 - 144 mEq/L 135 132(L) 134(L) - 135     UCx: pansensitive E coli    Transvenous Pacemaker and Heart Catheterization Note:  Procedure Summary  TVP placement  50-60% mid LAD stenosis, component of spasm improved with nitro. LVEDP=24mmhg. Recommendations  Aggressive risk factor management. Maximize medical therapy.   consider PCI of mid LAD in future if symptomatic or abn stress test.   Angiographic Findings   Cardiac Arteries and Lesion Findings  LMCA: small caliber, normal.  LAD: small caliber, mid 50-60%. diffuse disease  LCx: small caliber, mild diffuse disease  RCA: small caliber, mild disease. Dominance: Left   VA  Ventriculography Findings  LVEDP is 24 mmHg. Hospital Course:   41-year-old female with COPD, hypertension, chronic respiratory failure with hypoxia to L O2, type 2 diabetes, CKD presented on 10/5 with dysuria. She was found to have sepsis secondary to urinary tract infection and unfortunately her course was complicated by cardiac arrest on 10/7. She was treated with therapeutic hypothermia and had transvenous pacemaker placed for significant bradycardia. She also had her coronaries evaluated as described above. She ultimately improved and was extubated on 10/10. She did have SAÚL on CKD which stabilized. There was initial plan to go to skilled nursing facility however she ultimately improved further and was deemed appropriate for discharge home with home care. She was kept in the hospital and additional day so that family could get her room set up in their new house. She was complaining of left-sided rib pain related to CPR but was otherwise asymptomatic at the time of discharge. Of note, a urinalysis was sent for an unknown reason on 10/18 which ultimately grew gram-negative bridget. Because she was recently treated for sepsis and still had some leukocytosis at the time of discharge, I elected to send an additional 7 days of antibiotic. Given her recent cardiac arrest, I avoided fluoroquinolone and elected for Keflex twice daily- I discussed this with infectious disease prior to discharge. She was sent with printed script for prn tramadol q12h prn- 10 tablets given for rib pain related to CPR.         Exam on Discharge:   BP (!) 125/47   Pulse 68   Temp 97.8 °F (36.6 °C) (Oral)   Resp 18   Ht 4' 11\" (1.499 m)   Wt 146 lb 1.6 oz (ALDACTONE) 25 MG tablet  Take 1 tablet by mouth daily             torsemide (DEMADEX) 100 MG tablet  Take 0.5 tablets by mouth daily             traMADol (ULTRAM) 50 MG tablet  Take 1 tablet by mouth every 12 hours as needed for Pain for up to 5 days. Intended supply: 3 days.  Take lowest dose possible to manage pain             verapamil (CALAN SR) 120 MG extended release tablet  Take 1 tablet by mouth nightly                 DC time 37 minutes    Signed:  Jayesh Reece  10/20/2020, 2:29 PM

## 2020-10-20 NOTE — FLOWSHEET NOTE
Pts family here to take her home, PICC line removed prior with no issue. Dc instructions reviewed with the pt,son and . Pt to have David Mora at dc. At time of discharge, pt still has some sloughing on her buttocks and small open area.  also aware.

## 2020-10-20 NOTE — CARE COORDINATION
The discharge plan remains home today with [facility]. The LSW called and notified Destiney Serrato, that the patient will discharge home this afternoon per the RN's note.   Electronically signed by MILAN Pereyra on 10/20/20 at 11:52 AM EDT

## 2020-10-20 NOTE — PROGRESS NOTES
Progress Note  Date:10/19/2020       [facility]:[id number]  Patient Marga Altman     YOB: 1945     Age:75 y.o. Chief complaint uncontrolled diabetes    Subjective    Subjective:  Symptoms:  Stable. Diet:  Adequate intake. Activity level: Returning to normal.       Review of Systems   All other systems reviewed and are negative. Objective         Vitals Last 24 Hours:  TEMPERATURE:  Temp  Av.8 °F (37.1 °C)  Min: 98.8 °F (37.1 °C)  Max: 98.8 °F (37.1 °C)  RESPIRATIONS RANGE: Resp  Av  Min: 18  Max: 18  PULSE OXIMETRY RANGE: SpO2  Av %  Min: 100 %  Max: 100 %  PULSE RANGE: Pulse  Av  Min: 67  Max: 67  BLOOD PRESSURE RANGE: Systolic (23JIV), ABX:738 , Min:148 , MHB:522   ; Diastolic (85XBJ), SOC:74, Min:59, Max:59    I/O (24Hr): Intake/Output Summary (Last 24 hours) at 10/19/2020 2157  Last data filed at 10/19/2020 1611  Gross per 24 hour   Intake 960 ml   Output --   Net 960 ml     Objective:  General Appearance:  Comfortable. Vital signs: (most recent): Blood pressure (!) 148/59, pulse 67, temperature 98.8 °F (37.1 °C), temperature source Oral, resp. rate 18, height 4' 11\" (1.499 m), weight 140 lb 14.4 oz (63.9 kg), SpO2 100 %, not currently breastfeeding. Vital signs are normal.    HEENT: Normal HEENT exam.    Heart: Normal rate. Extremities: Normal range of motion. There is dependent edema. Neurological: Patient is alert and oriented to person, place and time. Results for Jose C Christensen (MRN [id number]) as of 10/19/2020 21:57   Ref.  Range 10/19/2020 05:47 10/19/2020 05:55 10/19/2020 10:57 10/19/2020 16:11 10/19/2020 21:23   Sodium Latest Ref Range: 135 - 144 mEq/L 135       Potassium Latest Ref Range: 3.4 - 4.9 mEq/L 4.4       Chloride Latest Ref Range: 95 - 107 mEq/L 98       CO2 Latest Ref Range: 20 - 31 mEq/L 29       BUN Latest Ref Range: 8 - 23 mg/dL 31 (H)       Creatinine Latest Ref Range: 0.50 - 0.90 mg/dL 2.91 (H)       Anion Gap Latest Ref Range: 9 - 15 mEq/L 8 (L)       GFR Non- Latest Ref Range: >60  15.7 (L)       GFR  Latest Ref Range: >60  19.0 (L)       Glucose Latest Ref Range: 70 - 99 mg/dL 141 (H)       POC Glucose Latest Ref Range: 60 - 115 mg/dl  252 (H) 210 (H) 132 (H) 289 (H)   Calcium Latest Ref Range: 8.5 - 9.9 mg/dL 8.9           Labs/Imaging/Diagnostics    Labs:  CBC:  Recent Labs     10/17/20  0600 10/18/20  0554 10/19/20  0547   WBC 14.5* 12.3* 12.2*   RBC 2.97* 3.00* 2.77*   HGB 8.1* 8.2* 7.5*   HCT 24.7* 24.7* 23.0*   MCV 83.2 82.4 83.1   RDW 22.2* 22.3* 22.9*    272 370     CHEMISTRIES:  Recent Labs     10/17/20  0600 10/18/20  0552 10/19/20  0547   * 132* 135   K 4.5  4.5 4.2 4.4   CL 98 95 98   CO2 25 29 29   BUN 34* 30* 31*   CREATININE 3.17* 2.99* 2.91*   GLUCOSE 333* 113* 141*     PT/INR:No results for input(s): PROTIME, INR in the last 72 hours. APTT:No results for input(s): APTT in the last 72 hours. LIVER PROFILE:  Recent Labs     10/17/20  0600   AST 53*   *   BILITOT <0.2   ALKPHOS 202*       Imaging Last 24 Hours:  No results found. Assessment//Plan           Hospital Problems           Last Modified POA    * (Principal) Sepsis ([facility] 10/6/2020 Yes    Lumbosacral radiculopathy at L5 (Chronic) 10/14/2020 Yes    Overview Signed 3/16/2015  1:37 PM by Tracey Wang, DO     [address] IS PROJECTING THE  RIGHT AFFECTING THE L5 NERVE ROOT WITHIN ITS NEURAL FORAMEN AND PERHAPS  AFFECTING THE RIGHT S1 NERVE ROOT WITHIN THE SPINAL CANAL.          Cardiopulmonary arrest ([facility] 10/7/2020 Yes    Hypertension 10/6/2020 Yes    Hyperlipidemia 10/6/2020 Yes    Reactive depression 10/14/2020 Yes    CHF (congestive heart failure) ([facility] 10/6/2020 Yes    Uncontrolled type 2 diabetes mellitus with hyperglycemia ([facility] 10/6/2020 Yes    COPD exacerbation ([facility] 10/14/2020 Yes    Major depressive disorder in remission ([facility] 10/6/2020 Yes Gastroesophageal reflux disease without esophagitis 10/6/2020 Yes    Acute cystitis without hematuria 10/6/2020 Yes    CKD (chronic kidney disease) stage 4, GFR 15-29 ml/min ([facility]) 10/6/2020 Yes    Gait abnormality 10/14/2020 Yes    Late effects of CVA (cerebrovascular accident) 10/14/2020 Yes    Overview Signed 10/14/2020  1:08 PM by Dolly Carranza DO      Remote left thalamic lacunar infarct. Assessment:    Condition: In stable condition. Improving. (Uncontrolled type 2 diabetes with labile blood sugars  Status post cardiac arrest  UTI sepsis  Chronic kidney disease). Plan:   Discharge home. (Continue Lantus 30 units at bedtime plus Humalog 6 units with each meals okay to discharge patient home with home health  Long-term A1c goal of 7.5 to 8.5 ).        Electronically signed by Wiley Bailey MD on 10/19/20 at 9:57 PM EDT

## 2020-10-21 ENCOUNTER — CARE COORDINATION (OUTPATIENT)
Dept: CASE MANAGEMENT | Age: 75
End: 2020-10-21

## 2020-10-21 LAB
ORGANISM: ABNORMAL
URINE CULTURE, ROUTINE: ABNORMAL

## 2020-10-22 ENCOUNTER — CARE COORDINATION (OUTPATIENT)
Dept: CASE MANAGEMENT | Age: 75
End: 2020-10-22

## 2020-10-22 RX ORDER — PAROXETINE HYDROCHLORIDE 40 MG/1
TABLET, FILM COATED ORAL
Qty: 90 TABLET | Refills: 0 | Status: SHIPPED | OUTPATIENT
Start: 2020-10-22 | End: 2020-11-27 | Stop reason: SDUPTHER

## 2020-10-22 RX ORDER — BLOOD SUGAR DIAGNOSTIC
STRIP MISCELLANEOUS
Qty: 100 EACH | Refills: 3 | Status: SHIPPED | OUTPATIENT
Start: 2020-10-22 | End: 2020-11-23 | Stop reason: SDUPTHER

## 2020-10-22 NOTE — CARE COORDINATION
[address] Transitions Initial Follow Up Call     Call within 2 business days of discharge: Yes     Patient: Fany Portillo      Patient : 1945   MRN: [id number]         Reason for Admission: 10/5-10/20/2020 [address] IP Septicemia, Abd pain  Discharge Date: 10/20/20     RARS: Readmission Risk Score: 43  NR  PCP 10/26 11:00  [address] [facility] Transitions request call back to P: [phone] for dc review. Two call attempts made.

## 2020-10-22 NOTE — TELEPHONE ENCOUNTER
Pt's  called in requesting the following medication refills:    PARoxetine (PAXIL) 40 MG tablet    Last OV: 09/22/2020    Next Visit scheduled:  10/26/2020

## 2020-10-26 ENCOUNTER — CARE COORDINATION (OUTPATIENT)
Dept: CASE MANAGEMENT | Age: 75
End: 2020-10-26

## 2020-10-26 ENCOUNTER — VIRTUAL VISIT (OUTPATIENT)
Dept: FAMILY MEDICINE CLINIC | Age: 75
End: 2020-10-26
Payer: MEDICARE

## 2020-10-26 PROCEDURE — 1111F DSCHRG MED/CURRENT MED MERGE: CPT | Performed by: FAMILY MEDICINE

## 2020-10-26 PROCEDURE — 99442 PR PHYS/QHP TELEPHONE EVALUATION 11-20 MIN: CPT | Performed by: NURSE PRACTITIONER

## 2020-10-26 RX ORDER — TRAMADOL HYDROCHLORIDE 50 MG/1
50 TABLET ORAL EVERY 12 HOURS PRN
Qty: 10 TABLET | Refills: 0 | Status: SHIPPED | OUTPATIENT
Start: 2020-10-26 | End: 2020-10-31

## 2020-10-26 ASSESSMENT — ENCOUNTER SYMPTOMS
SHORTNESS OF BREATH: 0
WHEEZING: 0
CONSTIPATION: 0
COUGH: 0
DIARRHEA: 0
ABDOMINAL PAIN: 0

## 2020-10-26 NOTE — PATIENT INSTRUCTIONS
wipe from front to back. When should you call for help? Call your doctor now or seek immediate medical care if:    · Symptoms such as fever, chills, nausea, or vomiting get worse or appear for the first time.     · You have new pain in your back just below your rib cage. This is called flank pain.     · There is new blood or pus in your urine.     · You have any problems with your antibiotic medicine. Watch closely for changes in your health, and be sure to contact your doctor if:    · You are not getting better after taking an antibiotic for 2 days.     · Your symptoms go away but then come back. Where can you learn more? Go to https://[employer]web.[employer] org and sign in to your [facility] account. Enter Q920 in the [employer] box to learn more about \"Urinary Tract Infection in Women: Care Instructions. \"     If you do not have an account, please click on the \"Sign Up Now\" link. Current as of: June 29, 2020               Content Version: 12.6  © 2006-2020 [employer]. Care instructions adapted under license by [facility] ([facility]). If you have questions about a medical condition or this instruction, always ask your healthcare professional. [address] any warranty or liability for your use of this information.

## 2020-10-26 NOTE — CARE COORDINATION
[address] Transitions Initial Follow Up Call     Call within 2 business days of discharge: Yes     [facility]:    FG         [id number] for Admission: 10/5-10/20/2020 [address] IP Septicemia, Abd pain  Discharge Date: 10/20/20     RARS: Readmission Risk Score: 37  NR  PCP 10/26 11:00  [facility] rec/1111F order completed 10/26/2020  Care Transitions       Challenges to be reviewed by the provider   Additional needs identified to be addressed with provider No  home health care-HC    Discussed COVID-19 related testing which was not done at this time. Test results were not done. Patient informed of results, if available? NA         Method of communication with provider : none    Advance Care Planning:   Does patient have an Advance Directive:  not on file. Was this a readmission? No  Patient stated reason for admission: Abd pain  Patients top risk factors for readmission: level of motivation and medical condition    Care Transition Nurse (CTN) contacted the family by telephone to perform post hospital discharge assessment. Verified name and  with family as identifiers. Provided introduction to self, and explanation of the CTN role. CTN reviewed discharge instructions, medical action plan and red flags with family who verbalized understanding. Family given an opportunity to ask questions and does not have any further questions or concerns at this time. Were discharge instructions available to patient? Yes. Reviewed appropriate site of care based on symptoms and resources available to patient including: When to call 911 and Reviewed symptoms of CV-19 and Influenza to report. . The family agrees to contact the PCP office for questions related to their healthcare. Medication reconciliation was performed with family, who verbalizes understanding of administration of home medications.  Advised obtaining a 90-day supply of all daily and as-needed medications. Covid Risk Education    Patient has following risk factors of: heart failure and diabetes. Education provided regarding infection prevention, and signs and symptoms of COVID-19 and when to seek medical attention with family who verbalized understanding. Discussed exposure protocols and quarantine From CDC: Are you at higher risk for severe illness?   and given an opportunity for questions and concerns. The family agrees to contact the COVID-19 hotline [phone] or PCP office for questions related to COVID-19. For more information on steps you can take to protect yourself, see CDC's How to Protect Yourself     Patient/family/caregiver given information for [employer] and agrees to enroll no  Patient's preferred e-mail: declines  Patient's preferred phone number: declines    Discussed follow-up appointments. If no appointment was previously scheduled, appointment scheduling offered: Reviewed. Is follow up appointment scheduled within 7 days of discharge? Yes  Non-[facility] follow up appointment(s):     Plan for follow-up call in 7-10 days based on severity of symptoms and risk factors. Plan for next call: symptom management-Spoke w/ pts son/[place]/caregiver. Reports pt has some difficulty rising from bed d/t chest soreness from having chest compressions. Ambulating w/ walker and assist. Reports some episodes of irritability and found talking to self at night. Family feels pt is grogressing well. Feels cognition at baseline but has some dementia symptoms that are not new. Denies any fevers, chills, cough, or flu-like symptoms. Does not note any new/worsened edema. [facility] for [facility] needs.

## 2020-10-26 NOTE — PROGRESS NOTES
Subjective:      Patient ID: Brittany Moe is a 67 y.o. female who presents today for:     Chief Complaint   Patient presents with    Follow-Up from Hospital     was admitted for sepsis and had cardiac arrest       HPI Pt recently d/c from hospital 10/20 for septic shock and cardiac arrest. She has been doing well with getting around except some rib pain with movement from compressions. She is taking antibiotics as prescribed. She has been taking tramadol for rib pain with moderate relief. She will be having evaluation for therapy today. She denies any sob, leg swelling, chest pain, fevers, chills, dysuria, frequency/urgency. Etc. She is tolerating all medication well. She needs to schedule appt with specialist. Has an appt with hematology in December. Reported vitals are stable per   BG 98  /78    Past Medical History:   Diagnosis Date    Anxiety     Asthma     dx 2019 / has smoked since age 11    CHF (congestive heart failure) (HCC)     Chronic back pain     Bilateral L5 S1 Radic on emg--surprisingly worse on the left than the right--pt's symptoms and her MRI show worse on the right    Chronic obstructive pulmonary disease with acute exacerbation ([address] 10/12/2019    Depression     Fibromyalgia     Hyperlipidemia     meds > 8 yrs    Hypertension     meds > 45 yrs    On home O2     2l per n/c at bedtime mostly,     Osteoarthritis     Type II diabetes mellitus, uncontrolled ([address]     hx > 8 yrs    Unspecified sleep apnea      Past Surgical History:   Procedure Laterality Date    BACK SURGERY  2017    lumbar disc    CARDIAC CATHETERIZATION  11/3/14     DR. MIRELES / no stents    COLONOSCOPY  08/29/2016    w/polypectomy     COLONOSCOPY N/A 9/29/2020    COLONOSCOPY WITH POLYPECTOMY performed by [facility] at [facility] N/A 10/7/2019    EUA HYSTEROSCOPY DILATATION AND CURETTAGE performed by Steve Long DO at [facility]  ENDOSCOPY, COLON, DIAGNOSTIC      EYE SURGERY      [place] with IOL OU / [address]    umbilical hernia repair    MT ESOPHAGOGASTRODUODENOSCOPY TRANSORAL DIAGNOSTIC N/A 3/24/2017    EGD ESOPHAGOGASTRODUODENOSCOPY performed by Amilcar Rinaldi MD at [facility] N/A 2018    negative findings    MT REVISE MEDIAN N/CARPAL TUNNEL SURG Left 2017    LEFT  CARPAL TUNNEL RELEASE performed by Gerhardt Credit, [facility] at [facility],AEWDA N/A 2018    TONSILLECTOMY      as child    UPPER GASTROINTESTINAL ENDOSCOPY  2016    w/bx     UPPER GASTROINTESTINAL ENDOSCOPY N/A 2020    EGD possible biopsy performed by Leticia Danielson MD at [facility] 14. N/A 2020    EGD PUSH ENTEROSCOPY performed [facility] Elham Keita MD at [facility]     Family History   Problem Relation Age of Onset    Heart Disease Father         cardiac bypass    Arthritis Father     Arthritis Mother     Other Mother          at age 80    Other Sister         [facility]    No Known Problems Daughter     Stroke Son      Social History     Socioeconomic History    Marital status:      Spouse name: Roque Goins Number of children: 2    Years of education: 15    Highest education level: High school graduate   Occupational History    Occupation: Retired-   Social Needs    Financial resource strain: Not hard at all   [place] insecurity     Worry: Never true     Inability: Never true    Transportation needs     Medical: No     Non-medical: No   Tobacco Use    Smoking status: Former Smoker     Packs/day: 1.00     Years: 59.00     Pack years: 59.00     Types: Cigarettes     Start date: 2017    Smokeless tobacco: Never Used    Tobacco comment: quit 2-3 weeks ago    Substance and Sexual Activity    Alcohol use: Not Currently    Drug use: No    Sexual activity: Yes     Partners: Male   Lifestyle    Physical activity     Days per week: 0 days     Minutes per session: 0 min    Stress:  Only a little   Relationships    Social connections     Talks on phone: More than three times a week     Gets together: More than three times a week     Attends Scientology service: 1 to 4 times per year     Active member of club or organization: No     Attends meetings of clubs or organizations: Never     Relationship status: Living with partner    Intimate partner violence     Fear of current or ex partner: No     Emotionally abused: No     Physically abused: No     Forced sexual activity: No   Other Topics Concern    Not on file   Social History Narrative    Grew up in [place]     Lives With:  [id number] Spouse    Type of Home: House    Home Layout: Two level, Performs ADL's on one level    Home Access: Stairs to enter with rails    Entrance Stairs - Number of Steps: 4    Bathroom Shower/Tub: Tub/Shower unit, Doors    Bathroom Equipment: Shower chair, Grab bars in [place]: Rolling walker, Cane, Oxygen(uses her O2 very seldom)    ADL Assistance: Needs assistance    Homemaking Assistance: Needs assistance    Homemaking Responsibilities: No(spouse performs)    Ambulation Assistance: Independent    Transfer Assistance: Independent    Active : No    Occupation: Retired    Type of occupation: worked in [employer]: patient enjoys [employer]     Current Outpatient Medications on File Prior to Visit   Medication Sig Dispense Refill    PARoxetine (PAXIL) 40 MG tablet TAKE 1 TABLET BY MOUTH ONCE DAILY IN THE MORNING 90 tablet 0    ACCU-CHEK PHYLLIS PLUS strip Test 3x daily Dx E11.65 100 each 3    isosorbide mononitrate (IMDUR) 60 MG extended release tablet Take 1 tablet by mouth daily 30 tablet 3    insulin glargine (LANTUS) 100 UNIT/ML injection vial 30 units at bedtime 3 vial 3    insulin lispro (HUMALOG) 100 UNIT/ML injection vial 6 units at each mels 1 vial 3    carvedilol (COREG) 25 MG tablet Take 1 tablet by mouth 2 times daily 60 tablet 3    cloNIDine (CATAPRES) 0.2 MG tablet Take 1 tablet by mouth 2 times daily 60 tablet 3    spironolactone (ALDACTONE) 25 MG tablet Take 1 tablet by mouth daily 30 tablet 3    torsemide (DEMADEX) 100 MG tablet Take 0.5 tablets by mouth daily 30 tablet 3    lidocaine 4 % external patch Place 1 patch onto the skin daily 1 box 1    cephALEXin (KEFLEX) 500 MG capsule Take 1 capsule by mouth 2 times daily for 7 days 14 capsule 0    rosuvastatin (CRESTOR) 40 MG tablet Take 1 tablet by mouth every evening 90 tablet 0    verapamil (CALAN SR) 120 MG extended release tablet Take 1 tablet by mouth nightly 30 tablet 3    pantoprazole (PROTONIX) 40 MG tablet Take 40mg twice daily (1st dose 30min before breakfast) for 30 days. After 30 days, you make take 40mg once daily before breakfast. 60 tablet 3    dicyclomine (BENTYL) 10 MG capsule Take 1 capsule by mouth 4 times daily as needed (abdominal pain) 120 capsule 3    ondansetron (ZOFRAN) 4 MG tablet Take 1 tablet by mouth every 8 hours as needed for Nausea or Vomiting (Patient not taking: Reported on 10/26/2020) 30 tablet 2     No current facility-administered medications on file prior to visit. Allergies:  Ibuprofen; Metformin and related; and Darvon [propoxyphene hcl]    Review of Systems   Constitutional: Negative for chills, fatigue and fever. HENT: Negative for congestion. Respiratory: Negative for cough, shortness of breath and wheezing. Cardiovascular: Positive for chest pain (ribs with movement). Negative for palpitations and leg swelling. Gastrointestinal: Negative for abdominal pain, constipation and diarrhea. Genitourinary: Negative for difficulty urinating, dysuria, frequency and urgency. Neurological: Negative for dizziness. Objective:   LMP  (LMP Unknown)     Physical Exam  Constitutional:       General: She is awake.  She is not in female evaluated via telephone on 10/26/2020. Consent:  She and/or health care decision maker is aware that that she may receive a bill for this telephone service, depending on her insurance coverage, and has provided verbal consent to proceed: Yes        This visit was a telephone encounter. Patient was located at their home. Provider was located at their _X__ home or        ____ office. I affirm this is a Patient Initiated Episode with an Established Patient who has not had a related appointment within my department in the past 7 days or scheduled within the next 24 hours.     Total Time: minutes: 11-20 minutes    Note: not billable if this call serves to triage the patient into an appointment for the relevant concern        Sean Lawson, APRN - CNP

## 2020-11-06 ENCOUNTER — CARE COORDINATION (OUTPATIENT)
Dept: CASE MANAGEMENT | Age: 75
End: 2020-11-06

## 2020-11-10 RX ORDER — ROSUVASTATIN CALCIUM 40 MG/1
TABLET, COATED ORAL
Qty: 90 TABLET | Refills: 0 | Status: SHIPPED | OUTPATIENT
Start: 2020-11-10 | End: 2021-02-12

## 2020-11-12 ENCOUNTER — APPOINTMENT (OUTPATIENT)
Dept: ENDOSCOPY | Age: 75
End: 2020-11-12
Payer: MEDICARE

## 2020-11-12 PROCEDURE — 3609019000 HC EGD CAPSULE ENDOSCOPY

## 2020-11-12 NOTE — PROGRESS NOTES
Nahum Green is her with her 2 sons. She is very lethargic. Explain test to her and her sons. They understand. Connected equipment, activated capsule and administered it. She swallowed it with no problem. Live view shows int in the stomach. Instructions for the day given and explained. Her son is going to take equipment off tonight at 7pm and will return it to the center in the morning.

## 2020-11-13 ENCOUNTER — TELEPHONE (OUTPATIENT)
Dept: FAMILY MEDICINE CLINIC | Age: 75
End: 2020-11-13

## 2020-11-17 ENCOUNTER — CARE COORDINATION (OUTPATIENT)
Dept: CASE MANAGEMENT | Age: 75
End: 2020-11-17

## 2020-11-17 NOTE — CARE COORDINATION
[address] THOMAS: [id number]   [facility]: [id number]         [id number] for Admission: 10/5-10/20/2020 [facility] IP Septicemia, Abd pain  Discharge Date: 10/20/20     RARS: Readmission Risk Score: 43  NR  PCP 10/26 11:00. Attended. [facility] [address] rec/1111F order completed 10/26/2020  Care Transitions      Care Transitions request call back to P: [phone] for final fu. CTN s/o.

## 2020-11-19 ENCOUNTER — TELEPHONE (OUTPATIENT)
Dept: FAMILY MEDICINE CLINIC | Age: 75
End: 2020-11-19

## 2020-11-19 NOTE — TELEPHONE ENCOUNTER
Scheduling outreach call to review Health Maintenance and / or schedule appointment.     AWV  due  Colonoscopy done  [facility] [address] [address] GAP NO  Lab work done  Mammogram   PHQ-9 done  Last appointment VV 9-22-20 w/  and f/u in 4 weeks after non fasting labs  Upcoming appointment no  Scanned and updated HM yes    Attempted to contact patient unable to reach

## 2020-11-23 RX ORDER — BLOOD SUGAR DIAGNOSTIC
STRIP MISCELLANEOUS
Qty: 100 EACH | Refills: 3 | Status: SHIPPED | OUTPATIENT
Start: 2020-11-23 | End: 2021-01-18 | Stop reason: SDUPTHER

## 2020-11-23 NOTE — TELEPHONE ENCOUNTER
Patient requesting medication refill.  Please approve or deny this request.    Rx requested:  Requested Prescriptions     Pending Prescriptions Disp Refills    ACCU-CHEK PHYLLIS PLUS strip 100 each 3     Sig: Test 3x daily Dx E11.65         Last Office Visit:   10/1/2020      Next Visit Date:  Future Appointments   Date Time Provider Zaid Lr   11/27/2020 11:45 AM Angelika Judd MD [address] [facility]

## 2020-11-27 ENCOUNTER — OFFICE VISIT (OUTPATIENT)
Dept: FAMILY MEDICINE CLINIC | Age: 75
End: 2020-11-27
Payer: MEDICARE

## 2020-11-27 VITALS
OXYGEN SATURATION: 99 % | BODY MASS INDEX: 24.19 KG/M2 | DIASTOLIC BLOOD PRESSURE: 60 MMHG | SYSTOLIC BLOOD PRESSURE: 110 MMHG | WEIGHT: 120 LBS | HEART RATE: 88 BPM | HEIGHT: 59 IN | RESPIRATION RATE: 14 BRPM | TEMPERATURE: 98.2 F

## 2020-11-27 PROCEDURE — G8484 FLU IMMUNIZE NO ADMIN: HCPCS | Performed by: FAMILY MEDICINE

## 2020-11-27 PROCEDURE — 1036F TOBACCO NON-USER: CPT | Performed by: FAMILY MEDICINE

## 2020-11-27 PROCEDURE — 3288F FALL RISK ASSESSMENT DOCD: CPT | Performed by: FAMILY MEDICINE

## 2020-11-27 PROCEDURE — G8420 CALC BMI NORM PARAMETERS: HCPCS | Performed by: FAMILY MEDICINE

## 2020-11-27 PROCEDURE — 1090F PRES/ABSN URINE INCON ASSESS: CPT | Performed by: FAMILY MEDICINE

## 2020-11-27 PROCEDURE — G8510 SCR DEP NEG, NO PLAN REQD: HCPCS | Performed by: FAMILY MEDICINE

## 2020-11-27 PROCEDURE — 4040F PNEUMOC VAC/ADMIN/RCVD: CPT | Performed by: FAMILY MEDICINE

## 2020-11-27 PROCEDURE — 99214 OFFICE O/P EST MOD 30 MIN: CPT | Performed by: FAMILY MEDICINE

## 2020-11-27 PROCEDURE — G8427 DOCREV CUR MEDS BY ELIG CLIN: HCPCS | Performed by: FAMILY MEDICINE

## 2020-11-27 PROCEDURE — 1123F ACP DISCUSS/DSCN MKR DOCD: CPT | Performed by: FAMILY MEDICINE

## 2020-11-27 PROCEDURE — G8400 PT W/DXA NO RESULTS DOC: HCPCS | Performed by: FAMILY MEDICINE

## 2020-11-27 RX ORDER — PAROXETINE HYDROCHLORIDE 40 MG/1
TABLET, FILM COATED ORAL
Qty: 90 TABLET | Refills: 0 | Status: SHIPPED | OUTPATIENT
Start: 2020-11-27 | End: 2021-01-22

## 2020-11-27 RX ORDER — TRAMADOL HYDROCHLORIDE 50 MG/1
50 TABLET ORAL EVERY 6 HOURS PRN
Status: ON HOLD | COMMUNITY
End: 2021-04-02 | Stop reason: HOSPADM

## 2020-11-27 ASSESSMENT — PATIENT HEALTH QUESTIONNAIRE - PHQ9
SUM OF ALL RESPONSES TO PHQ QUESTIONS 1-9: 0
SUM OF ALL RESPONSES TO PHQ QUESTIONS 1-9: 0
SUM OF ALL RESPONSES TO PHQ9 QUESTIONS 1 & 2: 0
SUM OF ALL RESPONSES TO PHQ QUESTIONS 1-9: 0
1. LITTLE INTEREST OR PLEASURE IN DOING THINGS: 0
2. FEELING DOWN, DEPRESSED OR HOPELESS: 0

## 2020-11-27 ASSESSMENT — ENCOUNTER SYMPTOMS: DIARRHEA: 0

## 2020-11-27 NOTE — PROGRESS NOTES
Subjective:      Patient ID: Rosana Hodgkins is a 67 y.o. female    Here in follow up for htn and lipids and depression. Diabetes care thru endocrine   Depression stable with paxil. Was recently in hospital for sepsis. Doing much better  Currently. Taking meds regularly . Review of Systems   Constitutional: Negative for chills and fever. Cardiovascular: Negative for chest pain. Gastrointestinal: Negative for diarrhea. Skin: Negative for rash. Neurological: Negative for weakness. Hypertension   This is a chronic problem. The current episode started more than 1 year ago. The problem is controlled. Pertinent negatives include no chest pain. Hypertensive end-organ damage includes CAD/MI. Diabetes   Pertinent negatives for diabetes include no chest pain and no weakness. Reviewed allergy, medical, social, surgical, family and med list changes and updated   Files     Social History     Socioeconomic History    Marital status:      Spouse name: Mendel Haggis Number of children: 2    Years of education: 15    Highest education level: High school graduate   Occupational History    Occupation: Retired-   Social Needs    Financial resource strain: Not hard at all   [place] insecurity     Worry: Never true     Inability: Never true    Transportation needs     Medical: No     Non-medical: No   Tobacco Use    Smoking status: Former Smoker     Packs/day: 1.00     Years: 59.00     Pack years: 59.00     Types: Cigarettes     Start date: 11/30/2017    Smokeless tobacco: Never Used    Tobacco comment: quit 2-3 weeks ago    Substance and Sexual Activity    Alcohol use: Not Currently    Drug use: No    Sexual activity: Yes     Partners: Male   Lifestyle    Physical activity     Days per week: 0 days     Minutes per session: 0 min    Stress:  Only a little   Relationships    Social connections     Talks on phone: More than three times a week     Gets together: More than three times a week     Attends Jew service: 1 to 4 times per year     Active member of club or organization: No     Attends meetings of clubs or organizations: Never     Relationship status: Living with partner    Intimate partner violence     Fear of current or ex partner: No     Emotionally abused: No     Physically abused: No     Forced sexual activity: No   Other Topics Concern    None   Social History Narrative    Grew up in [place]     Lives With:  [id number] Spouse    Type of Home: House    Home Layout: Two level, Performs ADL's on one level    Home Access: Stairs to enter with rails    Entrance Stairs - Number of Steps: 4    Bathroom Shower/Tub: Tub/Shower unit, Doors    Bathroom Equipment: Shower chair, Grab bars in [facility]: Rolling walker, Cane, Oxygen(uses her O2 very seldom)    ADL Assistance: Needs assistance    Homemaking Assistance: Needs assistance    Homemaking Responsibilities: No(spouse performs)    Ambulation Assistance: Independent    Transfer Assistance: Independent    Active : No    Occupation: Retired    Type of occupation: worked in [employer]: patient enjoys [employer]     Current Outpatient Medications   Medication Sig Dispense Refill    traMADol (ULTRAM) 50 MG tablet Take 50 mg by mouth every 6 hours as needed for Pain.       ACCU-CHEK PHYLLIS PLUS strip Test 3x daily Dx E11.65 100 each 3    rosuvastatin (CRESTOR) 40 MG tablet TAKE 1 TABLET BY MOUTH ONCE DAILY IN THE EVENING 90 tablet 0    PARoxetine (PAXIL) 40 MG tablet TAKE 1 TABLET BY MOUTH ONCE DAILY IN THE MORNING 90 tablet 0    isosorbide mononitrate (IMDUR) 60 MG extended release tablet Take 1 tablet by mouth daily 30 tablet 3    insulin glargine (LANTUS) 100 UNIT/ML injection vial 30 units at bedtime 3 vial 3    insulin lispro (HUMALOG) 100 UNIT/ML injection vial 6 units at each mels 1 vial 3    carvedilol (COREG) 25 MG tablet Take 1 tablet by mouth 2 times daily 60 tablet 3    cloNIDine (CATAPRES) 0.2 MG tablet Take 1 tablet by mouth 2 times daily 60 tablet 3    spironolactone (ALDACTONE) 25 MG tablet Take 1 tablet by mouth daily 30 tablet 3    torsemide (DEMADEX) 100 MG tablet Take 0.5 tablets by mouth daily 30 tablet 3    lidocaine 4 % external patch Place 1 patch onto the skin daily 1 box 1    verapamil (CALAN SR) 120 MG extended release tablet Take 1 tablet by mouth nightly 30 tablet 3    pantoprazole (PROTONIX) 40 MG tablet Take 40mg twice daily (1st dose 30min before breakfast) for 30 days. After 30 days, you make take 40mg once daily before breakfast. 60 tablet 3    dicyclomine (BENTYL) 10 MG capsule Take 1 capsule by mouth 4 times daily as needed (abdominal pain) 120 capsule 3    ondansetron (ZOFRAN) 4 MG tablet Take 1 tablet by mouth every 8 hours as needed for Nausea or Vomiting 30 tablet 2     No current facility-administered medications for this visit.       Family History   Problem Relation Age of Onset    Heart Disease Father         cardiac bypass    Arthritis Father     Arthritis Mother     Other Mother          at age 80    Other Sister         [facility]    No Known Problems Daughter     Stroke Son      Past Medical History:   Diagnosis Date    Anxiety     Asthma     dx  / has smoked since age 14    CHF (congestive heart failure) ([address]     Chronic back pain     Bilateral L5 S1 Radic on emg--surprisingly worse on the left than the right--pt's symptoms and her MRI show worse on the right    Chronic obstructive pulmonary disease with acute exacerbation ([address] 10/12/2019    Depression     Fibromyalgia     Hyperlipidemia     meds > 8 yrs    Hypertension     meds > 45 yrs    On home O2     2l per n/c at bedtime mostly,     Osteoarthritis     Type II diabetes mellitus, uncontrolled ([address]     hx > 8 yrs    Unspecified sleep apnea      Objective:   /60   Pulse 88   Temp 98.2 °F (36.8 °C)   Resp 14   Ht 4' 11\" (1.499 m)   Wt 120 lb (54.4 kg)   LMP  (LMP Unknown)   SpO2 99%   BMI 24.24 kg/m²     Physical Exam  Neck:no carotid bruits. No masses. No adenopathy. No thyroid asymmetry. Lungs:clear and equal breath sounds. No wheezes or rales. Heart:rate reg. 2/6 syst murmur across precordium   No gallops   Pulses:Radials 2+ equal               D.P  1+ equal  Extremities:no edema in either leg  Gen: In no acute distress  Abdomen; B.S present. Soft  Non tender. No hepatosplenomegaly. No masses   Patient with appropriate affect. Alert  Thought content appropriate  Good eye contact    Assessment:       Diagnosis Orders   1. Essential hypertension     2. Pure hypercholesterolemia     3.  Recurrent major depressive disorder, in remission ([facility] [address]           Plan:      Orders Placed This Encounter   Medications    PARoxetine (PAXIL) 40 MG tablet     Sig: TAKE 1 TABLET BY MOUTH ONCE DAILY IN THE MORNING     Dispense:  90 tablet     Refill:  0   fasting blood work soon and f/u after above

## 2020-11-27 NOTE — PROGRESS NOTES
Subjective:      Patient ID: Ravi Lyn is a 67 y.o. female. HPI    Review of Systems  Treatment Adherence:   Medication compliance:  {Desc; compliance:5303::\"compliant most of the time\"}  Diet compliance:  {Desc; compliance:5303::\"compliant most of the time\"}  Weight trend: {INCREASING/DECREASING/STABLE:77913}  Current exercise: {EXERCISE FKBZ:488262479}  What might prevent you from meeting your goal?: {Barriers to success:55644}  Patient plan for overcoming barriers: {COMMENT/NA:857869671}     Patient Confidence: {NUMBERS 1-10:20555}/10      Diabetes Mellitus Type 2: Current symptoms/problems include {Symptoms; diabetes:68822::\"none\"}. Home blood sugar records:  {diabetes glucometry results:69331}  Any episodes of hypoglycemia? {yes***/no:12263}  Eye exam current (within one year): {yes/no/unknown:74}  Tobacco history: She  reports that she has quit smoking. Her smoking use included cigarettes. She started smoking about 2 years ago. She has a 59.00 pack-year smoking history. She has never used smokeless tobacco.   Daily Aspirin? {yes no:507124::\"Yes\"}  Known diabetic complications: {diabetes complications:1215}    Hypertension:  Home blood pressure monitoring: {NO/YES:3162998786::\"No\"}. She {is/is not:9024} adherent to a low sodium diet. Patient {denies/complains:24460} {Symptoms of Hypertension, Denies:39255}. Antihypertensive medication side effects: {Hypertension med side effects:5728::\"no medication side effects noted\"}. Use of agents associated with hypertension: {bp agents assoc with hypertension:511::\"none\"}. Hyperlipidemia:  No new myalgias or GI upset on {RP HYPERLIPIDEMIA MEDS:00659}.        Lab Results   Component Value Date    LABA1C 9.2 (H) 10/13/2020    LABA1C 10.1 (H) 10/12/2020    LABA1C 11.8 (H) 10/07/2020     Lab Results   Component Value Date    LABMICR 2.70 (H) 01/02/2018    CREATININE 2.91 (H) 10/19/2020     Lab Results   Component Value Date     (H) 10/17/2020    AST 53 (H) 10/17/2020     Lab Results   Component Value Date    CHOL 105 02/24/2020    TRIG 67 02/24/2020    HDL 42 02/24/2020    LDLCALC 50 02/24/2020        Objective:   Physical Exam    Assessment / Plan:

## 2020-11-30 DIAGNOSIS — I10 ESSENTIAL HYPERTENSION: ICD-10-CM

## 2020-11-30 DIAGNOSIS — E78.00 PURE HYPERCHOLESTEROLEMIA: ICD-10-CM

## 2020-11-30 DIAGNOSIS — R53.83 OTHER FATIGUE: ICD-10-CM

## 2020-11-30 DIAGNOSIS — D50.0 IRON DEFICIENCY ANEMIA DUE TO CHRONIC BLOOD LOSS: ICD-10-CM

## 2020-11-30 LAB
ALBUMIN SERPL-MCNC: 4.1 G/DL (ref 3.5–4.6)
ALP BLD-CCNC: 150 U/L (ref 40–130)
ALT SERPL-CCNC: 21 U/L (ref 0–33)
ANION GAP SERPL CALCULATED.3IONS-SCNC: 13 MEQ/L (ref 9–15)
ANISOCYTOSIS: ABNORMAL
AST SERPL-CCNC: 22 U/L (ref 0–35)
BASOPHILS ABSOLUTE: 0.1 K/UL (ref 0–0.2)
BASOPHILS RELATIVE PERCENT: 1 %
BILIRUB SERPL-MCNC: <0.2 MG/DL (ref 0.2–0.7)
BUN BLDV-MCNC: 56 MG/DL (ref 8–23)
CALCIUM SERPL-MCNC: 9.7 MG/DL (ref 8.5–9.9)
CHLORIDE BLD-SCNC: 100 MEQ/L (ref 95–107)
CHOLESTEROL, TOTAL: 143 MG/DL (ref 0–199)
CO2: 24 MEQ/L (ref 20–31)
CREAT SERPL-MCNC: 3.06 MG/DL (ref 0.5–0.9)
EOSINOPHILS ABSOLUTE: 0.3 K/UL (ref 0–0.7)
EOSINOPHILS RELATIVE PERCENT: 5 %
FOLATE: 11.1 NG/ML (ref 7.3–26.1)
GFR AFRICAN AMERICAN: 17.9
GFR NON-AFRICAN AMERICAN: 14.8
GLOBULIN: 2.9 G/DL (ref 2.3–3.5)
GLUCOSE BLD-MCNC: 241 MG/DL (ref 70–99)
HCT VFR BLD CALC: 35.6 % (ref 37–47)
HDLC SERPL-MCNC: 61 MG/DL (ref 40–59)
HEMOGLOBIN: 11.1 G/DL (ref 12–16)
LDL CHOLESTEROL CALCULATED: 69 MG/DL (ref 0–129)
LYMPHOCYTES ABSOLUTE: 0.8 K/UL (ref 1–4.8)
LYMPHOCYTES RELATIVE PERCENT: 16 %
MCH RBC QN AUTO: 26.4 PG (ref 27–31.3)
MCHC RBC AUTO-ENTMCNC: 31.2 % (ref 33–37)
MCV RBC AUTO: 84.4 FL (ref 82–100)
MONOCYTES ABSOLUTE: 0.3 K/UL (ref 0.2–0.8)
MONOCYTES RELATIVE PERCENT: 5.7 %
NEUTROPHILS ABSOLUTE: 3.8 K/UL (ref 1.4–6.5)
NEUTROPHILS RELATIVE PERCENT: 72 %
OVALOCYTES: ABNORMAL
PDW BLD-RTO: 19.4 % (ref 11.5–14.5)
PLATELET # BLD: 325 K/UL (ref 130–400)
PLATELET SLIDE REVIEW: NORMAL
POIKILOCYTES: ABNORMAL
POLYCHROMASIA: ABNORMAL
POTASSIUM SERPL-SCNC: 5.4 MEQ/L (ref 3.4–4.9)
RBC # BLD: 4.21 M/UL (ref 4.2–5.4)
SLIDE REVIEW: ABNORMAL
SODIUM BLD-SCNC: 137 MEQ/L (ref 135–144)
TOTAL PROTEIN: 7 G/DL (ref 6.3–8)
TRIGL SERPL-MCNC: 64 MG/DL (ref 0–150)
TSH SERPL DL<=0.05 MIU/L-ACNC: 0.47 UIU/ML (ref 0.44–3.86)
VITAMIN B-12: 1255 PG/ML (ref 232–1245)
WBC # BLD: 5.3 K/UL (ref 4.8–10.8)

## 2020-12-03 ENCOUNTER — CARE COORDINATION (OUTPATIENT)
Dept: CARE COORDINATION | Age: 75
End: 2020-12-03

## 2020-12-03 NOTE — CARE COORDINATION
2nd attempt to reach patient. There was no answer. A message was left to have patient call back. Office number left. [phone].

## 2020-12-03 NOTE — CARE COORDINATION
writer attempt to reach patient to offer [facility]. There was no answer. A message was left to have patient call back. Office number left. [phone].

## 2020-12-04 ENCOUNTER — VIRTUAL VISIT (OUTPATIENT)
Dept: FAMILY MEDICINE CLINIC | Age: 75
End: 2020-12-04
Payer: MEDICARE

## 2020-12-04 PROCEDURE — 99442 PR PHYS/QHP TELEPHONE EVALUATION 11-20 MIN: CPT | Performed by: FAMILY MEDICINE

## 2020-12-04 ASSESSMENT — ENCOUNTER SYMPTOMS: ABDOMINAL PAIN: 0

## 2020-12-04 NOTE — PROGRESS NOTES
Subjective:      Patient ID: Chuck Reynaga is a 67 y.o. female    HPI  Here in follow up from recent blood work. No new complaints    Review of Systems   Constitutional: Positive for activity change. Gastrointestinal: Negative for abdominal pain. Skin: Negative for rash. Reviewed allergy, medical, social, surgical, family and med list changes and updated   Files-reviewed blood work    With worsening creatinine and mild anemia and minimally elevated alk phos  Social History     Socioeconomic History    Marital status:      Spouse name: Susan Sosa Number of children: 2    Years of education: 15    Highest education level: High school graduate   Occupational History    Occupation: Retired-   Social Needs    Financial resource strain: Not hard at all   [email or web address] insecurity     Worry: Never true     Inability: Never true    Transportation needs     Medical: No     Non-medical: No   Tobacco Use    Smoking status: Former Smoker     Packs/day: 1.00     Years: 59.00     Pack years: 59.00     Types: Cigarettes     Start date: 11/30/2017    Smokeless tobacco: Never Used    Tobacco comment: quit 2-3 weeks ago    Substance and Sexual Activity    Alcohol use: Not Currently    Drug use: No    Sexual activity: Yes     Partners: Male   Lifestyle    Physical activity     Days per week: 0 days     Minutes per session: 0 min    Stress:  Only a little   Relationships    Social connections     Talks on phone: More than three times a week     Gets together: More than three times a week     Attends Worship service: 1 to 4 times per year     Active member of club or organization: No     Attends meetings of clubs or organizations: Never     Relationship status: Living with partner    Intimate partner violence     Fear of current or ex partner: No     Emotionally abused: No     Physically abused: No     Forced sexual activity: No   Other Topics Concern    Not on file   Social History tablet 3    pantoprazole (PROTONIX) 40 MG tablet Take 40mg twice daily (1st dose 30min before breakfast) for 30 days. After 30 days, you make take 40mg once daily before breakfast. 60 tablet 3    dicyclomine (BENTYL) 10 MG capsule Take 1 capsule by mouth 4 times daily as needed (abdominal pain) 120 capsule 3    ondansetron (ZOFRAN) 4 MG tablet Take 1 tablet by mouth every 8 hours as needed for Nausea or Vomiting 30 tablet 2     No current facility-administered medications for this visit. Family History   Problem Relation Age of Onset    Heart Disease Father         cardiac bypass    Arthritis Father     Arthritis Mother     Other Mother          at age 80    Other Sister         [facility]    No Known Problems Daughter     Stroke Son      Past Medical History:   Diagnosis Date    Anxiety     Asthma     dx  / has smoked since age 14    [facility] failure[facility]     Chronic back pain     Bilateral L5 S1 Radic on emg--surprisingly worse on the left than the right--pt's symptoms and her MRI show worse on the right    Chronic obstructive pulmonary disease with acute exacerbation ([address] 10/12/2019    Depression     Fibromyalgia     Hyperlipidemia     meds > 8 yrs    Hypertension     meds > 39 yrs    On home O2     2l per n/c at bedtime mostly,     Osteoarthritis     Type II diabetes mellitus, uncontrolled ([address]     hx > 8 yrs    Unspecified sleep apnea    Wendall Nyhan is a 67 y.o. female evaluated via telephone on 2020.       Consent:  She and/or health care decision maker is aware that that she may receive a bill for this telephone service, depending on her insurance coverage, and has provided verbal consent to proceed: Yes  Patient accepts tele health phone visit and associated charges   Visit about 15 min   Documentation:  I communicated with the patient and/or health care decision maker about   Details of this discussion including any medical advice provided: This visit was a telephone encounter. Patient was located at their home. Provider was located at their ___ home or        __x_ office. I affirm this is a Patient Initiated Episode with an Established Patient who has not had a related appointment within my department in the past 7 days or scheduled within the next 24 hours. Total Time: minutes: 11-20 minutes    Note: not billable if this call serves to triage the patient into an appointment for the relevant concern      Alissa Chin   Objective:   LMP  (LMP Unknown)     Physical Exam  No exam done   Assessment:       Diagnosis Orders   1. Pure hypercholesterolemia     2.  CKD (chronic kidney disease) stage 4, GFR 15-29 ml/min ([facility])           Plan:      Continue current medications   F/u with specialities as already planned including renal as creatinine baseline much worse from earlier in year-does have renal appt in next week   F/u in spring--sooner if needed

## 2020-12-07 ENCOUNTER — TELEPHONE (OUTPATIENT)
Dept: GASTROENTEROLOGY | Age: 75
End: 2020-12-07

## 2020-12-09 RX ORDER — INSULIN GLARGINE 100 [IU]/ML
INJECTION, SOLUTION SUBCUTANEOUS
Qty: 3 VIAL | Refills: 3 | Status: SHIPPED | OUTPATIENT
Start: 2020-12-09 | End: 2020-12-17 | Stop reason: SDUPTHER

## 2020-12-17 ENCOUNTER — VIRTUAL VISIT (OUTPATIENT)
Dept: ENDOCRINOLOGY | Age: 75
End: 2020-12-17
Payer: MEDICARE

## 2020-12-17 PROCEDURE — 4040F PNEUMOC VAC/ADMIN/RCVD: CPT | Performed by: INTERNAL MEDICINE

## 2020-12-17 PROCEDURE — 1090F PRES/ABSN URINE INCON ASSESS: CPT | Performed by: INTERNAL MEDICINE

## 2020-12-17 PROCEDURE — 99213 OFFICE O/P EST LOW 20 MIN: CPT | Performed by: INTERNAL MEDICINE

## 2020-12-17 PROCEDURE — G8428 CUR MEDS NOT DOCUMENT: HCPCS | Performed by: INTERNAL MEDICINE

## 2020-12-17 PROCEDURE — 1123F ACP DISCUSS/DSCN MKR DOCD: CPT | Performed by: INTERNAL MEDICINE

## 2020-12-17 PROCEDURE — G8400 PT W/DXA NO RESULTS DOC: HCPCS | Performed by: INTERNAL MEDICINE

## 2020-12-17 RX ORDER — FLASH GLUCOSE SENSOR
KIT MISCELLANEOUS
Qty: 2 EACH | Refills: 1 | Status: SHIPPED | OUTPATIENT
Start: 2020-12-17 | End: 2021-12-09 | Stop reason: ALTCHOICE

## 2020-12-17 RX ORDER — FLASH GLUCOSE SCANNING READER
EACH MISCELLANEOUS
Qty: 1 DEVICE | Refills: 0 | Status: SHIPPED | OUTPATIENT
Start: 2020-12-17 | End: 2021-12-09 | Stop reason: ALTCHOICE

## 2020-12-17 RX ORDER — INSULIN GLARGINE 100 [IU]/ML
INJECTION, SOLUTION SUBCUTANEOUS
Qty: 3 VIAL | Refills: 3
Start: 2020-12-17 | End: 2021-01-18 | Stop reason: SDUPTHER

## 2020-12-17 NOTE — PROGRESS NOTES
2020    TELEHEALTH EVALUATION -- Audio/Visual (During WTZIS-05 public health emergency)    Due to COVID 19 outbreak, patient's office visit was converted to a virtual visit. Patient was contacted and agreed to proceed with a virtual visit via [email or web address]. me  The risks and benefits of converting to a virtual visit were discussed in light of the current infectious disease epidemic. Patient also understood that insurance coverage and co-pays are up to their individual insurance plans. HPI: Video visit patient was at home I was at my office    Tylor Padilla (:  1945) has requested an audio/video evaluation for the following concern(s):    Type 2 diabetes with fluctuating control over the years at this time patient blood sugars have been higher currently very compliant with diet testing blood sugar 2-3 times daily overall in the 200+ range 2-400 A1c between 9-12 range patient currently on Lantus at bedtime 30 units plus Humalog 6 units with each meals reviewed recent hospital admission in October for septicemia    Results for Leopold Grimes (MRN [id number]) as of 2020 14:00   Ref. Range 2020 05:24 2020 20:49 10/7/2020 13:44 10/12/2020 20:30 10/13/2020 14:08   Hemoglobin A1C Latest Ref Range: 4.8 - 5.9 % 6.9 (H) 9.6 (H) 11.8 (H) 10.1 (H) 9.2 (H)       Review of Systems    Prior to Visit Medications    Medication Sig Taking? Authorizing Provider   insulin glargine (LANTUS) 100 UNIT/ML injection vial 30 units at bedtime  Lyle Scott MD   traMADol (ULTRAM) 50 MG tablet Take 50 mg by mouth every 6 hours as needed for Pain.   Historical Provider, MD   PARoxetine (PAXIL) 40 MG tablet TAKE 1 TABLET BY MOUTH ONCE DAILY IN THE MORNING  Shay Win MD   ACCU-CHEK PHYLLIS PLUS strip Test 3x daily Dx E11.65  Roel Bean MD   rosuvastatin (CRESTOR) 40 MG tablet TAKE 1 TABLET BY MOUTH ONCE DAILY IN THE EVENING  Shay Win MD isosorbide mononitrate (IMDUR) 60 MG extended release tablet Take 1 tablet by mouth daily  Ovi Winters,    insulin lispro (HUMALOG) 100 UNIT/ML injection vial 6 units at each [employer], DO   carvedilol (COREG) 25 MG tablet Take 1 tablet by mouth 2 times daily  Ovi Winters, DO   cloNIDine (CATAPRES) 0.2 MG tablet Take 1 tablet by mouth 2 times daily  Ovi Winters, DO   spironolactone (ALDACTONE) 25 MG tablet Take 1 tablet by mouth daily  Ovi Winters, DO   torsemide (DEMADEX) 100 MG tablet Take 0.5 tablets by mouth daily  Ovi Winters, DO   lidocaine 4 % external patch Place 1 patch onto the skin daily  Ovi Winters, DO   verapamil (CALAN SR) 120 MG extended release tablet Take 1 tablet by mouth nightly  Bartolotherese Wagoner, DO   pantoprazole (PROTONIX) 40 MG tablet Take 40mg twice daily (1st dose 30min before breakfast) for 30 days. After 30 days, you make take 40mg once daily before breakfast.  Ovi Winters DO   dicyclomine (BENTYL) 10 MG capsule Take 1 capsule by mouth 4 times daily as needed (abdominal pain)  JOHNATHON Rodriguez CNP   ondansetron (ZOFRAN) 4 MG tablet Take 1 tablet by mouth every 8 hours as needed for Nausea or Vomiting  JOHNATHON Munoz CNP       Social History     Tobacco Use    Smoking status: Former Smoker     Packs/day: 1.00     Years: 59.00     Pack years: 59.00     Types: Cigarettes     Start date: 11/30/2017    Smokeless tobacco: Never Used    Tobacco comment: quit 2-3 weeks ago    Substance Use Topics    Alcohol use: Not Currently    Drug use:  No            PHYSICAL EXAMINATION:  [ INSTRUCTIONS:  \"[x]\" Indicates a positive item  \"[]\" Indicates a negative item  -- DELETE ALL ITEMS NOT EXAMINED]  [] Alert  [] Oriented to person/place/time    [] No apparent distress  [] Toxic appearing    [] Face flushed appearing [] Sclera clear  [] Lips are cyanotic      [] Breathing appears normal  [] Appears tachypneic [] Rash on visible skin    [] Cranial Nerves II-XII grossly intact    [] Motor grossly intact in visible upper extremities    [] Motor grossly intact in visible lower extremities    [] Normal Mood  [] Anxious appearing    [] Depressed appearing  [] Confused appearing      [] Poor short term memory  [] Poor long term memory    [] OTHER:      Due to this being a TeleHealth encounter, evaluation of the following organ systems is limited: Vitals/Constitutional/EENT/Resp/CV/GI//MS/Neuro/Skin/Heme-Lymph-Imm. ASSESSMENT/PLAN:     Diagnosis Orders   1. Type 2 diabetes mellitus with other specified complication, with long-term current use of insulin (HCC)  insulin glargine (LANTUS) 100 UNIT/ML injection vial     Orders Placed This Encounter   Medications    insulin glargine (LANTUS) 100 UNIT/ML injection vial     Si units at bedtime     Dispense:  3 vial     Refill:  3    insulin lispro (HUMALOG) 100 UNIT/ML injection vial     Si units at each mels     Dispense:  1 vial     Refill:  3    Continuous Blood Gluc Sensor (FREESTYLE MURALI 14 DAY SENSOR) MISC     Sig: Use every 2 weeks     Dispense:  2 each     Refill:  1    Continuous Blood Gluc  (FREESTYLE MURALI 14 DAY READER) [place]     Sig: As directed     Dispense:  1 Device     Refill:  0     Increase Lantus to 40 units at bedtime Humalog to 8 units with each meals plus patient was prescribed freestyle murali for continuous glucose monitoring for better monitoring and control A1c goal of 8 or lower      An  electronic signature was used to authenticate this note.     --Milton Aguilar MD on 2020 at 1:59 PM Pursuant to the emergency declaration under the [facility], [facility] waiver authority and the [employer] and [employer] Act, this Virtual  Visit was conducted, with patient's consent, to reduce the patient's risk of exposure to COVID-19 and provide continuity of care for an established patient. Services were provided through a video synchronous discussion virtually to substitute for in-person clinic visit.

## 2020-12-29 ENCOUNTER — TELEPHONE (OUTPATIENT)
Dept: ENDOCRINOLOGY | Age: 75
End: 2020-12-29

## 2020-12-29 NOTE — TELEPHONE ENCOUNTER
Sachin See, a nurse from [employer] called to let you know that Antoine Palacios is having high blood sugar. Today her reading was 414. Patient would like a call at [phone] with advice on what she should do. Please advise.

## 2020-12-30 ENCOUNTER — TELEPHONE (OUTPATIENT)
Dept: CARDIOLOGY CLINIC | Age: 75
End: 2020-12-30

## 2020-12-30 NOTE — TELEPHONE ENCOUNTER
The patient called and I spoke with her and her . They did not want to change the appointment. They are scheduled to see us and [place] on the same day. . its easier for transportation arrangements. They will be more attentive to their meal planning and sodium restrictions. They are also aware of the fluid restriction per Dr. Lucius Abrams. They are to contact the office with any further questions or concerns and are to keep the upcoming appointment.

## 2021-01-05 RX ORDER — IPRATROPIUM BROMIDE AND ALBUTEROL SULFATE 2.5; .5 MG/3ML; MG/3ML
3 SOLUTION RESPIRATORY (INHALATION) 3 TIMES DAILY
Qty: 360 ML | Refills: 0 | Status: SHIPPED | OUTPATIENT
Start: 2021-01-05 | End: 2021-05-10 | Stop reason: SDUPTHER

## 2021-01-05 NOTE — TELEPHONE ENCOUNTER
Vikki Veras called office requesting medication refill. She states they have recently moved and realized today  she is using the last vial of med.     Rx requested:  Requested Prescriptions     Pending Prescriptions Disp Refills    ipratropium-albuterol (DUONEB) 0.5-2.5 (3) MG/3ML SOLN nebulizer solution 360 mL 0     Sig: Inhale 3 mLs into the lungs 3 times daily       Last Office Visit:   12/4/2020    Next Visit Date:  Future Appointments   Date Time Provider Zaid Adeline   1/15/2021  2:15 PM [place] [id number]   1/18/2021  1:00 PM Twyla Wiggins MD [facility]   3/16/2021  2:00 PM Shelby Castro MD [address] [place] [address]

## 2021-01-11 ENCOUNTER — TELEPHONE (OUTPATIENT)
Dept: ENDOCRINOLOGY | Age: 76
End: 2021-01-11

## 2021-01-11 NOTE — TELEPHONE ENCOUNTER
Maria C, calling from care coordination is calling in to report some blood sugar readings over the weekend for the patient. Her reading today was  305. Yesterday it was 437, Saturday 360, Friday 495, and Thursday it was  459. Patient is not having any symptoms other than increased thirst.  Patient is taking all medication as prescribed. Patient does have an appointment scheduled for Monday, January 18th, but Beverly Guo just wanted to make you aware of these reading incase you wanted to make changes to the patient's meds or routine. Please advise.

## 2021-01-12 NOTE — TELEPHONE ENCOUNTER
Have patient make a virtual appointment with me on Wilberto Salazar to discuss blood sugars thank you

## 2021-01-15 ENCOUNTER — OFFICE VISIT (OUTPATIENT)
Dept: CARDIOLOGY CLINIC | Age: 76
End: 2021-01-15
Payer: MEDICARE

## 2021-01-15 VITALS
SYSTOLIC BLOOD PRESSURE: 124 MMHG | RESPIRATION RATE: 16 BRPM | HEART RATE: 65 BPM | HEIGHT: 59 IN | DIASTOLIC BLOOD PRESSURE: 70 MMHG | WEIGHT: 131 LBS | BODY MASS INDEX: 26.41 KG/M2 | OXYGEN SATURATION: 99 %

## 2021-01-15 DIAGNOSIS — I50.41 ACUTE COMBINED SYSTOLIC AND DIASTOLIC CHF, NYHA CLASS 1 (HCC): Primary | ICD-10-CM

## 2021-01-15 DIAGNOSIS — I25.10 CORONARY ARTERY DISEASE INVOLVING NATIVE CORONARY ARTERY OF NATIVE HEART WITHOUT ANGINA PECTORIS: ICD-10-CM

## 2021-01-15 DIAGNOSIS — E78.2 MIXED HYPERLIPIDEMIA: ICD-10-CM

## 2021-01-15 DIAGNOSIS — N18.4 STAGE 4 CHRONIC KIDNEY DISEASE (HCC): ICD-10-CM

## 2021-01-15 DIAGNOSIS — I10 ESSENTIAL HYPERTENSION: ICD-10-CM

## 2021-01-15 PROCEDURE — 99214 OFFICE O/P EST MOD 30 MIN: CPT | Performed by: INTERNAL MEDICINE

## 2021-01-15 PROCEDURE — G8427 DOCREV CUR MEDS BY ELIG CLIN: HCPCS | Performed by: INTERNAL MEDICINE

## 2021-01-15 PROCEDURE — G8417 CALC BMI ABV UP PARAM F/U: HCPCS | Performed by: INTERNAL MEDICINE

## 2021-01-15 PROCEDURE — 4040F PNEUMOC VAC/ADMIN/RCVD: CPT | Performed by: INTERNAL MEDICINE

## 2021-01-15 PROCEDURE — 1123F ACP DISCUSS/DSCN MKR DOCD: CPT | Performed by: INTERNAL MEDICINE

## 2021-01-15 PROCEDURE — G8484 FLU IMMUNIZE NO ADMIN: HCPCS | Performed by: INTERNAL MEDICINE

## 2021-01-15 PROCEDURE — 1090F PRES/ABSN URINE INCON ASSESS: CPT | Performed by: INTERNAL MEDICINE

## 2021-01-15 PROCEDURE — G8400 PT W/DXA NO RESULTS DOC: HCPCS | Performed by: INTERNAL MEDICINE

## 2021-01-15 PROCEDURE — 1036F TOBACCO NON-USER: CPT | Performed by: INTERNAL MEDICINE

## 2021-01-15 ASSESSMENT — ENCOUNTER SYMPTOMS
NAUSEA: 0
SHORTNESS OF BREATH: 0
STRIDOR: 0
GASTROINTESTINAL NEGATIVE: 1
RESPIRATORY NEGATIVE: 1
CHEST TIGHTNESS: 0
COUGH: 0
BLOOD IN STOOL: 0
EYES NEGATIVE: 1
WHEEZING: 0

## 2021-01-15 NOTE — PROGRESS NOTES
11/3/14     DR. MIRELES / no stents    COLONOSCOPY  2016    w/polypectomy     COLONOSCOPY N/A 2020    COLONOSCOPY WITH POLYPECTOMY performed by Amarilys Back MD at [facility] N/A 10/7/2019    EUA HYSTEROSCOPY DILATATION AND CURETTAGE performed by Philip Beard DO at [facility]. [address], COLON, DIAGNOSTIC      EYE SURGERY      [place] with IOL OU / [address] [place]  [id number]    umbilical hernia repair    NH ESOPHAGOGASTRODUODENOSCOPY TRANSORAL DIAGNOSTIC N/A 3/24/2017    EGD ESOPHAGOGASTRODUODENOSCOPY performed by Trina Landa MD at [facility] N/A 2018    negative findings    NH REVISE MEDIAN N/CARPAL TUNNEL SURG Left 2017    LEFT  CARPAL TUNNEL RELEASE performed by Diaz Davison MD at [facility],3+F/X,APFME N/A 2018    TONSILLECTOMY      as child    UPPER GASTROINTESTINAL ENDOSCOPY  2016    w/bx     UPPER GASTROINTESTINAL ENDOSCOPY N/A 2020    EGD possible biopsy performed by Amarilys Back MD at [address] N/A 2020    EGD PUSH ENTEROSCOPY performed by Bozena Norris MD at [facility]       Family History   Problem Relation Age of Onset    Heart Disease Father         cardiac bypass    Arthritis Father     Arthritis Mother     Other Mother          at age 80    Other Sister         nuknown health hx    No Known Problems Daughter     Stroke Son        Social History     Socioeconomic History    Marital status:      Spouse name: Deann Antonio Number of children: 2    Years of education: 15    Highest education level: High school graduate   Occupational History    Occupation: Retired-   Social Needs    Financial resource strain: Not hard at all   [employer] insecurity     Worry: Never true     Inability: Never true    Transportation needs     Medical: No Non-medical: No   Tobacco Use    Smoking status: Former Smoker     Packs/day: 1.00     Years: 59.00     Pack years: 59.00     Types: Cigarettes     Start date: 11/30/2017    Smokeless tobacco: Never Used    Tobacco comment: quit 2-3 weeks ago    Substance and Sexual Activity    Alcohol use: Not Currently    Drug use: No    Sexual activity: Yes     Partners: Male   Lifestyle    Physical activity     Days per week: 0 days     Minutes per session: 0 min    Stress:  Only a little   Relationships    Social connections     Talks on phone: More than three times a week     Gets together: More than three times a week     Attends Holiness service: 1 to 4 times per year     Active member of club or organization: No     Attends meetings of clubs or organizations: Never     Relationship status: Living with partner    Intimate partner violence     Fear of current or ex partner: No     Emotionally abused: No     Physically abused: No     Forced sexual activity: No   Other Topics Concern    None   Social History Narrative    Grew up in [place]     Lives With:  [id number] Spouse    Type of Home: House    Home Layout: Two level, Performs ADL's on one level    Home Access: Stairs to enter with rails    Entrance Stairs - Number of Steps: 4    Bathroom Shower/Tub: Tub/Shower unit, Doors    Bathroom Equipment: Shower chair, Grab bars in [facility]: Rolling walker, Cane, Oxygen(uses her O2 very seldom)    ADL Assistance: Needs assistance    Homemaking Assistance: Needs assistance    Homemaking Responsibilities: No(spouse performs)    Ambulation Assistance: Independent    Transfer Assistance: Independent    Active : No    Occupation: Retired    Type of occupation: worked in [employer]: patient enjoys [employer]       Allergies   Allergen Reactions    Ibuprofen Nausea Only    Metformin And Related      Diarrhea      Darvon [Propoxyphene Hcl] Nausea And Vomiting       Current Outpatient Medications   Medication Sig Dispense Refill    ipratropium-albuterol (DUONEB) 0.5-2.5 (3) MG/3ML SOLN nebulizer solution Inhale 3 mLs into the lungs 3 times daily 360 mL 0    insulin glargine (LANTUS) 100 UNIT/ML injection vial 40 units at bedtime 3 vial 3    insulin lispro (HUMALOG) 100 UNIT/ML injection vial 8 units at each mels 1 vial 3    Continuous Blood Gluc Sensor (FREESTYLE MURALI 14 DAY SENSOR) [facility] Use every 2 weeks 2 each 1    Continuous Blood Gluc  (FREESTYLE MURALI 14 DAY READER) [place] As directed 1 Device 0    traMADol (ULTRAM) 50 MG tablet Take 50 mg by mouth every 6 hours as needed for Pain.  PARoxetine (PAXIL) 40 MG tablet TAKE 1 TABLET BY MOUTH ONCE DAILY IN THE MORNING 90 tablet 0    ACCU-CHEK PHYLLIS PLUS strip Test 3x daily Dx E11.65 100 each 3    rosuvastatin (CRESTOR) 40 MG tablet TAKE 1 TABLET BY MOUTH ONCE DAILY IN THE EVENING 90 tablet 0    isosorbide mononitrate (IMDUR) 60 MG extended release tablet Take 1 tablet by mouth daily 30 tablet 3    carvedilol (COREG) 25 MG tablet Take 1 tablet by mouth 2 times daily 60 tablet 3    cloNIDine (CATAPRES) 0.2 MG tablet Take 1 tablet by mouth 2 times daily 60 tablet 3    spironolactone (ALDACTONE) 25 MG tablet Take 1 tablet by mouth daily 30 tablet 3    torsemide (DEMADEX) 100 MG tablet Take 0.5 tablets by mouth daily 30 tablet 3    verapamil (CALAN SR) 120 MG extended release tablet Take 1 tablet by mouth nightly 30 tablet 3    pantoprazole (PROTONIX) 40 MG tablet Take 40mg twice daily (1st dose 30min before breakfast) for 30 days. After 30 days, you make take 40mg once daily before breakfast. 60 tablet 3    dicyclomine (BENTYL) 10 MG capsule Take 1 capsule by mouth 4 times daily as needed (abdominal pain) 120 capsule 3    ondansetron (ZOFRAN) 4 MG tablet Take 1 tablet by mouth every 8 hours as needed for Nausea or Vomiting 30 tablet 2     No current facility-administered medications for this visit.         Review of Systems:   Review of Systems   Constitutional: Negative. Negative for diaphoresis and fatigue. HENT: Negative. Eyes: Negative. Respiratory: Negative. Negative for cough, chest tightness, shortness of breath, wheezing and stridor. Cardiovascular: Negative. Negative for chest pain, palpitations and leg swelling. Gastrointestinal: Negative. Negative for blood in stool and nausea. Genitourinary: Negative. Musculoskeletal: Negative. Skin: Negative. Neurological: Negative. Negative for dizziness, syncope, weakness and light-headedness. Hematological: Negative. Psychiatric/Behavioral: Negative. Physical Examination:    /70 (Site: Left Upper Arm, Position: Sitting, Cuff Size: Medium Adult)   Pulse 65   Resp 16   Ht 4' 11\" (1.499 m)   Wt 131 lb (59.4 kg)   LMP  (LMP Unknown)   SpO2 99%   BMI 26.46 kg/m²    Physical Exam   Constitutional: She appears healthy. No distress. HENT:   Normal cephalic and Atraumatic   Eyes: Pupils are equal, round, and reactive to light. Neck: Normal range of motion and thyroid normal. Neck supple. No JVD present. No neck adenopathy. No thyromegaly present. Cardiovascular: Normal rate, regular rhythm, intact distal pulses and normal pulses. Murmur heard. Pulmonary/Chest: Effort normal and breath sounds normal. She has no wheezes. She has no rales. She exhibits no tenderness. Abdominal: Soft. Bowel sounds are normal. There is no abdominal tenderness. Musculoskeletal: Normal range of motion. General: No tenderness or edema. Neurological: She is alert and oriented to person, place, and time. Skin: Skin is warm. No cyanosis. Nails show no clubbing.        LABS:  CBC:   Lab Results   Component Value Date    WBC 5.3 11/30/2020    RBC 4.21 11/30/2020    HGB 11.1 11/30/2020    HCT 35.6 11/30/2020    MCV 84.4 11/30/2020    MCH 26.4 11/30/2020    MCHC 31.2 11/30/2020    RDW 19.4 11/30/2020     11/30/2020    MPV 8.8 08/01/2015     Lipids:  Lab Results   Component Value Date    CHOL 143 11/30/2020    CHOL 105 02/24/2020    CHOL 158 10/13/2019     Lab Results   Component Value Date    TRIG 64 11/30/2020    TRIG 67 02/24/2020    TRIG 118 10/13/2019     Lab Results   Component Value Date    HDL 61 (H) 11/30/2020    HDL 42 02/24/2020    HDL 61 (H) 10/13/2019     Lab Results   Component Value Date    LDLCALC 69 11/30/2020    LDLCALC 50 02/24/2020    LDLCALC 73 10/13/2019     No results found for: LABVLDL, VLDL  Lab Results   Component Value Date    CHOLHDLRATIO 3.4 09/11/2012     CMP:    Lab Results   Component Value Date     11/30/2020    K 5.4 11/30/2020    K 4.4 10/19/2020     11/30/2020    CO2 24 11/30/2020    BUN 56 11/30/2020    CREATININE 3.06 11/30/2020    GFRAA 17.9 11/30/2020    LABGLOM 14.8 11/30/2020    GLUCOSE 241 11/30/2020    PROT 7.0 11/30/2020    LABALBU 4.1 11/30/2020    CALCIUM 9.7 11/30/2020    BILITOT <0.2 11/30/2020    ALKPHOS 150 11/30/2020    AST 22 11/30/2020    ALT 21 11/30/2020     BMP:    Lab Results   Component Value Date     11/30/2020    K 5.4 11/30/2020    K 4.4 10/19/2020     11/30/2020    CO2 24 11/30/2020    BUN 56 11/30/2020    LABALBU 4.1 11/30/2020    CREATININE 3.06 11/30/2020    CALCIUM 9.7 11/30/2020    GFRAA 17.9 11/30/2020    LABGLOM 14.8 11/30/2020    GLUCOSE 241 11/30/2020     Magnesium:    Lab Results   Component Value Date    MG 1.8 10/15/2020     TSH:  Lab Results   Component Value Date    TSH 0.474 11/30/2020       Patient Active Problem List   Diagnosis    Hypertension    Hyperlipidemia    Uncontrolled type 2 diabetes mellitus with complication, without long-term current use of insulin (HCC)    Fibromyalgia    Anxiety    Smoker    Insomnia    DJD (degenerative joint disease), lumbar    Lumbosacral radiculopathy at S1    DDD (degenerative disc disease), lumbar    Dysphonia    CTS (carpal tunnel syndrome)    High risk medication use-Norco - 12/20/17 kidney disease) stage 4, GFR 15-29 ml/min ([facility])    Cardiopulmonary arrest ([facility])    Gait abnormality    Late effects of CVA (cerebrovascular accident)       Medications Discontinued During This Encounter   Medication Reason    lidocaine 4 % external patch LIST CLEANUP       Modified Medications    No medications on file       No orders of the defined types were placed in this encounter. Assessment/Plan:    1. Acute combined systolic and diastolic CHF, NYHA class 1 ([facility])   compensated   - Basic Metabolic Panel; Future    2. Essential hypertension   stable    3. Mixed hyperlipidemia       4. HOCM (hypertrophic obstructive cardiomyopathy) ([facility])  Continue BB ACEI. And Dig. F/U Labs reviewed. K stable at 4.1     F/u Dr. Ever Gerardo 3 mo. May consider f/u Echo to assess LVOT Gradient after max BB        Counseling:  Heart Healthy Lifestyle, Low Salt Diet, Take Precautions to Prevent Falls and Walk Daily    Return in about 2 months (around 3/15/2021).       Electronically signed by Otto Currie MD on 1/15/2021 at 2:35 PM

## 2021-01-18 ENCOUNTER — TELEPHONE (OUTPATIENT)
Dept: ENDOCRINOLOGY | Age: 76
End: 2021-01-18

## 2021-01-18 ENCOUNTER — VIRTUAL VISIT (OUTPATIENT)
Dept: ENDOCRINOLOGY | Age: 76
End: 2021-01-18
Payer: MEDICARE

## 2021-01-18 DIAGNOSIS — E11.69 TYPE 2 DIABETES MELLITUS WITH OTHER SPECIFIED COMPLICATION, WITH LONG-TERM CURRENT USE OF INSULIN (HCC): ICD-10-CM

## 2021-01-18 DIAGNOSIS — Z79.4 TYPE 2 DIABETES MELLITUS WITH OTHER SPECIFIED COMPLICATION, WITH LONG-TERM CURRENT USE OF INSULIN (HCC): ICD-10-CM

## 2021-01-18 PROCEDURE — G8428 CUR MEDS NOT DOCUMENT: HCPCS | Performed by: INTERNAL MEDICINE

## 2021-01-18 PROCEDURE — 1123F ACP DISCUSS/DSCN MKR DOCD: CPT | Performed by: INTERNAL MEDICINE

## 2021-01-18 PROCEDURE — 1090F PRES/ABSN URINE INCON ASSESS: CPT | Performed by: INTERNAL MEDICINE

## 2021-01-18 PROCEDURE — 99213 OFFICE O/P EST LOW 20 MIN: CPT | Performed by: INTERNAL MEDICINE

## 2021-01-18 PROCEDURE — G8400 PT W/DXA NO RESULTS DOC: HCPCS | Performed by: INTERNAL MEDICINE

## 2021-01-18 PROCEDURE — 4040F PNEUMOC VAC/ADMIN/RCVD: CPT | Performed by: INTERNAL MEDICINE

## 2021-01-18 RX ORDER — BLOOD SUGAR DIAGNOSTIC
STRIP MISCELLANEOUS
Qty: 100 EACH | Refills: 3 | Status: SHIPPED | OUTPATIENT
Start: 2021-01-18 | End: 2021-05-12 | Stop reason: SDUPTHER

## 2021-01-18 RX ORDER — LANCETS
EACH MISCELLANEOUS
Qty: 100 EACH | Refills: 3 | Status: SHIPPED | OUTPATIENT
Start: 2021-01-18

## 2021-01-18 RX ORDER — INSULIN GLARGINE 100 [IU]/ML
INJECTION, SOLUTION SUBCUTANEOUS
Qty: 3 VIAL | Refills: 3 | Status: SHIPPED | OUTPATIENT
Start: 2021-01-18 | End: 2021-03-15 | Stop reason: SDUPTHER

## 2021-01-18 NOTE — PROGRESS NOTES
2021    TELEHEALTH EVALUATION -- Audio/Visual (During QOVYO-73 public health emergency)    Due to [place] 19 outbreak, patient's office visit was converted to a virtual visit. Patient was contacted and agreed to proceed with a virtual visit via [email or web address]  The risks and benefits of converting to a virtual visit were discussed in light of the current infectious disease epidemic. Patient also understood that insurance coverage and co-pays are up to their individual insurance plans. HPI: Video visit patient was at home I was at my office    Audrey Madrid (:  1945) has requested an audio/video evaluation for the following concern(s):    Follow-up on type 2 diabetes blood sugars are staying between 2-500 range patient not following any kind of diet also drinking a lot of juices no recent labs to review currently on for insulin injections daily complication diabetes include chronic kidney disease coronary artery disease CVA  Patient currently on Lantus 40 units at bedtime Humalog 6 to 8 units with each meals  Results for Amber Murdock (MRN [id number]) as of 2021 13:19   Ref.  Range 2020 11:19   Sodium Latest Ref Range: 135 - 144 mEq/L 137   Potassium Latest Ref Range: 3.4 - 4.9 mEq/L 5.4 (H)   Chloride Latest Ref Range: 95 - 107 mEq/L 100   CO2 Latest Ref Range: 20 - 31 mEq/L 24   BUN Latest Ref Range: 8 - 23 mg/dL 56 (H)   Creatinine Latest Ref Range: 0.50 - 0.90 mg/dL 3.06 (H)   Anion Gap Latest Ref Range: 9 - 15 mEq/L 13   GFR Non- Latest Ref Range: >60  14.8 (L)   GFR  Latest Ref Range: >60  17.9 (L)   Glucose Latest Ref Range: 70 - 99 mg/dL 241 (H)   Calcium Latest Ref Range: 8.5 - 9.9 mg/dL 9.7   Total Protein Latest Ref Range: 6.3 - 8.0 g/dL 7.0   Cholesterol, Total Latest Ref Range: 0 - 199 mg/dL 143   HDL Cholesterol Latest Ref Range: 40 - 59 mg/dL 61 (H)   LDL Calculated Latest Ref Range: 0 - 129 mg/dL 69   Triglycerides Latest Ref Range: 0 - 150 mg/dL 64   Albumin Latest Ref Range: 3.5 - 4.6 g/dL 4.1   Globulin Latest Ref Range: 2.3 - 3.5 g/dL 2.9   Alk Phos Latest Ref Range: 40 - 130 U/L 150 (H)   ALT Latest Ref Range: 0 - 33 U/L 21   AST Latest Ref Range: 0 - 35 U/L 22   Bilirubin Latest Ref Range: 0.2 - 0.7 mg/dL <0.2   TSH Latest Ref Range: 0.440 - 3.860 uIU/mL 0.474     Patient Active Problem List   Diagnosis    Hypertension    Hyperlipidemia    Uncontrolled type 2 diabetes mellitus with complication, without long-term current use of insulin (HCC)    Fibromyalgia    Anxiety    Smoker    Insomnia    DJD (degenerative joint disease), lumbar    Lumbosacral radiculopathy at S1    DDD (degenerative disc disease), lumbar    Dysphonia    CTS (carpal tunnel syndrome)    High risk medication use-Norco - 12/20/17 OARRS PM&R, 02/20/18 OARRS PM&R, 03/07/18 OARRS PM&R, Urine Drug screen negative 02/06/17 PM&R--MED CONTRACT 2/6/17    SOB (shortness of breath) on exertion    Chest pain    Memory deficit    Artificial lens present    Presbyopia    Astigmatism, regular    Cataract, nuclear sclerotic senile    IDDM (insulin dependent diabetes mellitus)    Regular astigmatism    Nuclear senile cataract    Neck pain    Lumbosacral radiculopathy at L5    Spinal stenosis of lumbar region with neurogenic claudication    Impaired mobility and activities of daily living    Non-compliant patient NO showed FU 1/10/17 Dr Shakira Canchola Insomnia secondary to chronic pain    Reactive depression    Diabetic radiculopathy (HCC)    Cervical radicular pain    Diabetic asymmetric polyneuropathy (HCC)    Myalgia    Intercostal neuropathy    Osteoarthritis of spine with radiculopathy, lumbar region    Acute combined systolic and diastolic CHF, NYHA class 1 (HCC)    PMB (postmenopausal bleeding)    Acute on chronic diastolic heart failure (HCC)    CHF (congestive heart failure) (HCC)    Hypertensive urgency    Uncontrolled type 2 diabetes mellitus with hyperglycemia (HCC)    Chronic obstructive pulmonary disease with acute exacerbation (HCC)    Acute on chronic diastolic (congestive) heart failure (HCC)    SAÚL [facility]acute kidney injury) ([facility] [address]    Hypoglycemia    HOCM (hypertrophic obstructive cardiomyopathy) (HCC)    Gastrointestinal hemorrhage    COPD exacerbation (HCC)    Anemia    AVM (arteriovenous malformation)    Symptomatic anemia    Flash pulmonary edema (HCC)    Acute on chronic kidney failure (HCC)    Dysarthria    Chronic fatigue    Acute encephalopathy    Adenomatous polyp of sigmoid colon    Adenomatous polyp of transverse colon    Sepsis (HCC)    Major depressive disorder in remission ([facility] [address]    Gastroesophageal reflux disease without esophagitis    Acute cystitis without hematuria    CKD (chronic kidney disease) stage 4, GFR 15-29 ml/min (HCC)    Cardiopulmonary arrest (HCC)    Gait abnormality    Late effects of CVA (cerebrovascular accident)     Allergies   Allergen Reactions    Ibuprofen Nausea Only    Metformin And Related      Diarrhea      Darvon [Propoxyphene Hcl] Nausea And Vomiting         Review of Systems   Constitutional: Positive for fatigue. Cardiovascular: Negative. Endocrine: Negative. Neurological: Positive for weakness. All other systems reviewed and are negative. Prior to Visit Medications    Medication Sig Taking?  Authorizing Provider   ipratropium-albuterol (DUONEB) 0.5-2.5 (3) MG/3ML SOLN nebulizer solution Inhale 3 mLs into the lungs 3 times daily  Ángel Gurrola MD   insulin glargine (LANTUS) 100 UNIT/ML injection vial 40 units at bedtime  Lyle Scott MD   insulin lispro (HUMALOG) 100 UNIT/ML injection vial 8 units at each Dusty Oscar MD   Continuous Blood Gluc Sensor (FREESTYLE MURALI 14 DAY SENSOR) [facility] Use every 2 weeks  Cinda Martinez MD   Continuous Blood Gluc  (FREESTYLE MURALI 14 DAY READER) [place] As directed  Cinda Martinez MD   traMADol (ULTRAM) 50 MG tablet Take 50 mg by mouth every 6 hours as needed for Pain. Historical Provider, MD   PARoxetine (PAXIL) 40 MG tablet TAKE 1 TABLET BY MOUTH ONCE DAILY IN THE MORNING  Teodoro Harris MD   ACCU-CHEK PHYLLIS PLUS strip Test 3x daily Dx E11.65  Nico Tarango MD   rosuvastatin (CRESTOR) 40 MG tablet TAKE 1 TABLET BY MOUTH ONCE DAILY IN THE EVENING  Teodoro Harris MD   isosorbide mononitrate (IMDUR) 60 MG extended release tablet Take 1 tablet by mouth daily  Yomi Vazquez DO   carvedilol (COREG) 25 MG tablet Take 1 tablet by mouth 2 times daily  Yomi Vazquez DO   cloNIDine (CATAPRES) 0.2 MG tablet Take 1 tablet by mouth 2 times daily  Yomi Vazquez DO   spironolactone (ALDACTONE) 25 MG tablet Take 1 tablet by mouth daily  Yomi Vazquez DO   torsemide (DEMADEX) 100 MG tablet Take 0.5 tablets by mouth daily  Yomi Vazquez DO   verapamil (CALAN SR) 120 MG extended release tablet Take 1 tablet by mouth nightly  Joe Ceron DO   pantoprazole (PROTONIX) 40 MG tablet Take 40mg twice daily (1st dose 30min before breakfast) for 30 days. After 30 days, you make take 40mg once daily before breakfast.  Yomi Vazquez DO   dicyclomine (BENTYL) 10 MG capsule Take 1 capsule by mouth 4 times daily as needed (abdominal pain)  JOHNATHON Cisneros CNP   ondansetron (ZOFRAN) 4 MG tablet Take 1 tablet by mouth every 8 hours as needed for Nausea or Vomiting  JOHNATHON Christian CNP       Social History     Tobacco Use    Smoking status: Former Smoker     Packs/day: 1.00     Years: 59.00     Pack years: 59.00     Types: Cigarettes     Start date: 11/30/2017    Smokeless tobacco: Never Used    Tobacco comment: quit 2-3 weeks ago    Substance Use Topics    Alcohol use: Not Currently    Drug use:  No            PHYSICAL EXAMINATION:  [ INSTRUCTIONS:  \"[x]\" Indicates a positive item  \"[]\" Indicates a negative item  -- DELETE ALL ITEMS NOT EXAMINED]  [] Alert  [] Oriented to person/place/time    [] No apparent distress  [] Toxic appearing    [] Face flushed appearing [] Sclera clear  [] Lips are cyanotic      [] Breathing appears normal  [] Appears tachypneic      [] Rash on visible skin    [] Cranial Nerves II-XII grossly intact    [] Motor grossly intact in visible upper extremities    [] Motor grossly intact in visible lower extremities    [] Normal Mood  [] Anxious appearing    [] Depressed appearing  [] Confused appearing      [] Poor short term memory  [] Poor long term memory    [] OTHER:      Due to this being a TeleHealth encounter, evaluation of the following organ systems is limited: Vitals/Constitutional/EENT/Resp/CV/GI//MS/Neuro/Skin/Heme-Lymph-Imm. ASSESSMENT/PLAN:     Diagnosis Orders   1. Type 2 diabetes mellitus with other specified complication, with long-term current use of insulin (HCC)  insulin glargine (LANTUS) 100 UNIT/ML injection vial    Basic Metabolic Panel    Hemoglobin A1C     Orders Placed This Encounter   Procedures    Basic Metabolic Panel     Standing Status:   Future     Standing Expiration Date:   2022    Hemoglobin A1C     Standing Status:   Future     Standing Expiration Date:   2022     Increase Lantus to 60 units at bedtime increase Humalog to 15 with each meals A1c goal of 8 or lower advised patient to eat 3 meals a day avoid snacks and juices verbalized understanding  Orders Placed This Encounter   Medications    insulin glargine (LANTUS) 100 UNIT/ML injection vial     Si units at bedtime     Dispense:  3 vial     Refill:  3    insulin lispro (HUMALOG) 100 UNIT/ML injection vial     Sig: 15 units at each mels     Dispense:  1 vial     Refill:  3       No follow-ups on file. An  electronic signature was used to authenticate this note.     --Kirk Yap MD on 2021 at 1:19 PM        Pursuant to the emergency declaration under the [address] [facility] waiver authority and the [employer] Supplemental Appropriations Act, this Virtual  Visit was conducted, with patient's consent, to reduce the patient's risk of exposure to COVID-19 and provide continuity of care for an established patient. Services were provided through a video synchronous discussion virtually to substitute for in-person clinic visit.

## 2021-01-22 ENCOUNTER — TELEPHONE (OUTPATIENT)
Dept: FAMILY MEDICINE CLINIC | Age: 76
End: 2021-01-22

## 2021-01-22 ENCOUNTER — TELEPHONE (OUTPATIENT)
Dept: ENDOCRINOLOGY | Age: 76
End: 2021-01-22

## 2021-01-22 RX ORDER — PAROXETINE HYDROCHLORIDE 40 MG/1
TABLET, FILM COATED ORAL
Qty: 90 TABLET | Refills: 1 | Status: SHIPPED | OUTPATIENT
Start: 2021-01-22 | End: 2021-07-26

## 2021-01-22 NOTE — TELEPHONE ENCOUNTER
Pt is experiencing a dry cough x1 week with no other symptoms. Pt is asking for your recommendation on a medication to help the cough without disrupting her blood sugar.

## 2021-01-22 NOTE — TELEPHONE ENCOUNTER
Patient nurse called, patient sugar is keeps going high to low .      Please advise patient come in to get CGM

## 2021-01-28 ENCOUNTER — TELEPHONE (OUTPATIENT)
Dept: ENDOCRINOLOGY | Age: 76
End: 2021-01-28

## 2021-02-08 NOTE — TELEPHONE ENCOUNTER
Requested Prescriptions     Pending Prescriptions Disp Refills    torsemide (DEMADEX) 100 MG tablet 30 tablet 3     Sig: Take 0.5 tablets by mouth daily

## 2021-02-09 RX ORDER — TORSEMIDE 100 MG/1
50 TABLET ORAL DAILY
Qty: 30 TABLET | Refills: 0 | Status: ON HOLD | OUTPATIENT
Start: 2021-02-09 | End: 2021-04-02 | Stop reason: HOSPADM

## 2021-02-12 RX ORDER — ROSUVASTATIN CALCIUM 40 MG/1
TABLET, COATED ORAL
Qty: 90 TABLET | Refills: 0 | Status: SHIPPED | OUTPATIENT
Start: 2021-02-12 | End: 2021-06-18

## 2021-02-15 ENCOUNTER — VIRTUAL VISIT (OUTPATIENT)
Dept: ENDOCRINOLOGY | Age: 76
End: 2021-02-15
Payer: MEDICARE

## 2021-02-15 DIAGNOSIS — E11.69 TYPE 2 DIABETES MELLITUS WITH OTHER SPECIFIED COMPLICATION, WITH LONG-TERM CURRENT USE OF INSULIN (HCC): Primary | ICD-10-CM

## 2021-02-15 DIAGNOSIS — Z79.4 TYPE 2 DIABETES MELLITUS WITH OTHER SPECIFIED COMPLICATION, WITH LONG-TERM CURRENT USE OF INSULIN (HCC): Primary | ICD-10-CM

## 2021-02-15 PROCEDURE — 99442 PR PHYS/QHP TELEPHONE EVALUATION 11-20 MIN: CPT | Performed by: INTERNAL MEDICINE

## 2021-02-15 NOTE — PROGRESS NOTES
2/15/2021    TELEHEALTH EVALUATION -- Audio/Visual (During QEXHY-92 public health emergency)    Due to [place] 19 outbreak, patient's office visit was converted to a virtual visit. Patient was contacted and agreed to proceed with a virtual visit via Telephone Visit  The risks and benefits of converting to a virtual visit were discussed in light of the current infectious disease epidemic. Patient also understood that insurance coverage and co-pays are up to their individual insurance plans. HPI: Telephone visit patient was at home I was at my office    Harriet Gonzalez (:  1945) has requested an audio/video evaluation for the following concern(s):    Follow-up on type 2 diabetes patient currently on Lantus 50 units at night with supposed to be on 60 takes Humalog 15 with each meals testing 3 times daily blood sugars are mostly in the 200s low to middle has come down from before weight was 300+ watching her diet and lowering her sweet juice intake no recent A1c to review reviewed chemistries from November  Last hemoglobin A1c was 9 from October      Lab Results   Component Value Date    LABA1C 9.2 (H) 10/13/2020           Results for Hemalatha Seay (MRN [id number]) as of 2/15/2021 11:41   Ref.  Range 10/20/2020 11:08 10/20/2020 22:46 2020 11:19   Sodium Latest Ref Range: 135 - 144 mEq/L   137   Potassium Latest Ref Range: 3.4 - 4.9 mEq/L   5.4 (H)   Chloride Latest Ref Range: 95 - 107 mEq/L   100   CO2 Latest Ref Range: 20 - 31 mEq/L   24   BUN Latest Ref Range: 8 - 23 mg/dL   56 (H)   Creatinine Latest Ref Range: 0.50 - 0.90 mg/dL   3.06 (H)   Anion Gap Latest Ref Range: 9 - 15 mEq/L   13   GFR Non- Latest Ref Range: >60    14.8 (L)   GFR  Latest Ref Range: >60    17.9 (L)   Glucose Latest Ref Range: 70 - 99 mg/dL   241 (H)   POC Glucose Latest Ref Range: 60 - 115 mg/dl 273 (H)     Calcium Latest Ref Range: 8.5 - 9.9 mg/dL   9.7   Total Protein Latest Ref Range: 6.3 - 8.0 g/dL   7.0   Cholesterol, Total Latest Ref Range: 0 - 199 mg/dL   143   HDL Cholesterol Latest Ref Range: 40 - 59 mg/dL   61 (H)   LDL Calculated Latest Ref Range: 0 - 129 mg/dL   69   Triglycerides Latest Ref Range: 0 - 150 mg/dL   64   Albumin Latest Ref Range: 3.5 - 4.6 g/dL   4.1   Globulin Latest Ref Range: 2.3 - 3.5 g/dL   2.9   Alk Phos Latest Ref Range: 40 - 130 U/L   150 (H)   ALT Latest Ref Range: 0 - 33 U/L   21   AST Latest Ref Range: 0 - 35 U/L   22   Bilirubin Latest Ref Range: 0.2 - 0.7 mg/dL   <0.2   TSH Latest Ref Range: 0.440 - 3.860 uIU/mL   0.474       Review of Systems    Prior to Visit Medications    Medication Sig Taking? Authorizing Provider   rosuvastatin (CRESTOR) 40 MG tablet TAKE 1 TABLET BY MOUTH ONCE DAILY IN THE EVENING  Ximena Solorio MD   torsemide (DEMADEX) 100 MG tablet Take 0.5 tablets by mouth daily  Ximena Solorio MD   PARoxetine (PAXIL) 40 MG tablet TAKE 1 TABLET BY MOUTH ONCE DAILY IN THE MORNING  Ximena Solorio MD   insulin glargine (LANTUS) 100 UNIT/ML injection vial 60 units at bedtime  Kirk Yap MD   insulin lispro (HUMALOG) 100 UNIT/ML injection vial 15 units at each Bj Kamara MD   ACCU-CHEK PHYLLIS PLUS strip Test 3x daily Dx J57.59  Kirk Yap MD   Accu-Chek Softclix Lancets MISC bid  Kirk Yap MD   Blood Glucose Monitoring Suppl (ACCU-CHEK PHYLLIS CONNECT) w/Device KIT 1 kit by Does not apply route daily  Kirk Yap MD   blood glucose test strips (ACCU-CHEK PHYLLIS PLUS) strip Patient test 3x daily E65.11  Kirk Yap MD   ipratropium-albuterol (DUONEB) 0.5-2.5 (3) MG/3ML SOLN nebulizer solution Inhale 3 mLs into the lungs 3 times daily  Ximena Solorio MD   Continuous Blood Gluc Sensor (FREESTYLE MURALI 14 DAY SENSOR) [facility] Use every 2 weeks  Kirk Yap MD   Continuous Blood Gluc  (FREESTYLE MURALI 14 DAY READER) [place] As directed  Kirk Yap MD   traMADol (ULTRAM) 50 MG tablet Take 50 mg by mouth every 6 hours as needed for Pain. Historical Provider, MD   isosorbide mononitrate (IMDUR) 60 MG extended release tablet Take 1 tablet by mouth daily  [place] How, DO   carvedilol (COREG) 25 MG tablet Take 1 tablet by mouth 2 times daily  Abdoul Rubalcava, DO   cloNIDine (CATAPRES) 0.2 MG tablet Take 1 tablet by mouth 2 times daily  Abdoul Rubalcava, DO   spironolactone (ALDACTONE) 25 MG tablet Take 1 tablet by mouth daily  Abdoul Rubalcava, DO   verapamil (CALAN SR) 120 MG extended release tablet Take 1 tablet by mouth nightly  Clifm Luma, DO   pantoprazole (PROTONIX) 40 MG tablet Take 40mg twice daily (1st dose 30min before breakfast) for 30 days. After 30 days, you make take 40mg once daily before breakfast.  Abdoul Rubalcava, DO   dicyclomine (BENTYL) 10 MG capsule Take 1 capsule by mouth 4 times daily as needed (abdominal pain)  JOHNATHON Jain CNP   ondansetron (ZOFRAN) 4 MG tablet Take 1 tablet by mouth every 8 hours as needed for Nausea or Vomiting  JOHNATHON Chan CNP       Social History     Tobacco Use    Smoking status: Former Smoker     Packs/day: 1.00     Years: 59.00     Pack years: 59.00     Types: Cigarettes     Start date: 11/30/2017    Smokeless tobacco: Never Used    Tobacco comment: quit 2-3 weeks ago    Substance Use Topics    Alcohol use: Not Currently    Drug use:  No            PHYSICAL EXAMINATION:  [ INSTRUCTIONS:  \"[x]\" Indicates a positive item  \"[]\" Indicates a negative item  -- DELETE ALL ITEMS NOT EXAMINED]  [] Alert  [] Oriented to person/place/time    [] No apparent distress  [] Toxic appearing    [] Face flushed appearing [] Sclera clear  [] Lips are cyanotic      [] Breathing appears normal  [] Appears tachypneic      [] Rash on visible skin    [] Cranial Nerves II-XII grossly intact    [] Motor grossly intact in visible upper extremities    [] Motor grossly intact in visible lower extremities    [] Normal Mood  [] Anxious appearing    [] Depressed appearing  [] Confused

## 2021-02-16 ENCOUNTER — TELEPHONE (OUTPATIENT)
Dept: FAMILY MEDICINE CLINIC | Age: 76
End: 2021-02-16

## 2021-02-16 NOTE — TELEPHONE ENCOUNTER
Devon Ruvalcaba from [facility] calling asking if you would like to change patients baseline weight from 120 to 134-135. Contact number is [phone] ext.  [phone]

## 2021-02-23 ENCOUNTER — TELEPHONE (OUTPATIENT)
Dept: FAMILY MEDICINE CLINIC | Age: 76
End: 2021-02-23

## 2021-02-23 NOTE — TELEPHONE ENCOUNTER
[facility] RN  calling in to report elivated 144 heart rate, 150/85 blood pressure,       Please contact patient with advice

## 2021-02-25 ENCOUNTER — TELEPHONE (OUTPATIENT)
Dept: ENDOCRINOLOGY | Age: 76
End: 2021-02-25

## 2021-03-03 ENCOUNTER — TELEPHONE (OUTPATIENT)
Dept: CARDIOLOGY CLINIC | Age: 76
End: 2021-03-03

## 2021-03-03 NOTE — TELEPHONE ENCOUNTER
Received call for [employer]. She was on a 3 way call with patient. Patient states she is having tachycardia. HR today was 132 and 115. She states she doesn't feel well and has been nauseous and vomited x1. She also had a nosebleed. BP was 158/82. Patient's son then got on the phone and stated that she didn't take her medications today due to nausea and this is nothing new for her. He requested appt be moved up to next week. Appt given for 3-. Call son Miguelina Vergara back with any new orders if needed. [phone].

## 2021-03-11 ENCOUNTER — VIRTUAL VISIT (OUTPATIENT)
Dept: CARDIOLOGY CLINIC | Age: 76
End: 2021-03-11
Payer: MEDICARE

## 2021-03-11 DIAGNOSIS — N18.4 STAGE 4 CHRONIC KIDNEY DISEASE (HCC): ICD-10-CM

## 2021-03-11 DIAGNOSIS — I50.41 ACUTE COMBINED SYSTOLIC AND DIASTOLIC CHF, NYHA CLASS 1 (HCC): Primary | ICD-10-CM

## 2021-03-11 DIAGNOSIS — I16.0 HYPERTENSIVE URGENCY: ICD-10-CM

## 2021-03-11 DIAGNOSIS — I42.1 HOCM (HYPERTROPHIC OBSTRUCTIVE CARDIOMYOPATHY) (HCC): ICD-10-CM

## 2021-03-11 DIAGNOSIS — I25.10 CORONARY ARTERY DISEASE INVOLVING NATIVE CORONARY ARTERY OF NATIVE HEART WITHOUT ANGINA PECTORIS: ICD-10-CM

## 2021-03-11 DIAGNOSIS — I10 ESSENTIAL HYPERTENSION: ICD-10-CM

## 2021-03-11 DIAGNOSIS — E78.2 MIXED HYPERLIPIDEMIA: ICD-10-CM

## 2021-03-11 LAB — PATHOLOGIST REPORT, TRANSFUSION REACTION: NORMAL

## 2021-03-11 PROCEDURE — G8400 PT W/DXA NO RESULTS DOC: HCPCS | Performed by: INTERNAL MEDICINE

## 2021-03-11 PROCEDURE — 99214 OFFICE O/P EST MOD 30 MIN: CPT | Performed by: INTERNAL MEDICINE

## 2021-03-11 PROCEDURE — G8428 CUR MEDS NOT DOCUMENT: HCPCS | Performed by: INTERNAL MEDICINE

## 2021-03-11 PROCEDURE — 1090F PRES/ABSN URINE INCON ASSESS: CPT | Performed by: INTERNAL MEDICINE

## 2021-03-11 PROCEDURE — 4040F PNEUMOC VAC/ADMIN/RCVD: CPT | Performed by: INTERNAL MEDICINE

## 2021-03-11 PROCEDURE — 1123F ACP DISCUSS/DSCN MKR DOCD: CPT | Performed by: INTERNAL MEDICINE

## 2021-03-11 RX ORDER — CLONIDINE HYDROCHLORIDE 0.2 MG/1
0.2 TABLET ORAL 2 TIMES DAILY
Qty: 180 TABLET | Refills: 3 | Status: SHIPPED | OUTPATIENT
Start: 2021-03-11 | End: 2021-05-25 | Stop reason: ALTCHOICE

## 2021-03-11 ASSESSMENT — ENCOUNTER SYMPTOMS
GASTROINTESTINAL NEGATIVE: 1
CHEST TIGHTNESS: 0
COUGH: 0
RESPIRATORY NEGATIVE: 1
SHORTNESS OF BREATH: 0
NAUSEA: 0
WHEEZING: 0
EYES NEGATIVE: 1
STRIDOR: 0
BLOOD IN STOOL: 0

## 2021-03-11 NOTE — PROGRESS NOTES
Subsequent Progress Note  Patient: Federica Lopez  YOB: 1945  MRN: [id number]    Chief Complaint: htn HF SOB DM HOCM  Chief Complaint   Patient presents with    Coronary Artery Disease       CV Data:  3/2019 Echo EF 79% no KYLE   10/8/2019 Cath CX 20-30 LVEF 95%   10/2020 Echo EF 55 Moderate [facility]  10/5/2020 Cath LAD 50-60     Subjective/HPI: she stopped Verapamil 3 days ago due to severe fatigue and weakness. Feels better now. No cp no sob no bleed. C/o nocturnal leg cramps    3/27/2020 Patient and/or health care decision maker is aware that that he may receive a bill for this telephone service, depending on his insurance coverage, and has provided verbal consent to proceed. This visit was completed via telephone. Time spent on the phone with patient 18 minutes. She was to f/u with Dr. Gisele Santos but was on my VV schedule today. No CP no SOB. Still has occasional night time leg cramps. She is walking and compliant with meds. No LE edema. 1/15/21 recent multiple hospitalization. She was intubated and in shock. Had TVP for Bradycardia but stabilized and did not require PPM.      3/11/21 TELEHEALTH EVALUATION -- Audio/Visual (During [facility] public health emergency)    No cp no sob no falls no bleed. Not walking much. Poor appetite.  /104. .. ran out of Verapamil and Clonidine.      EKG:    Past Medical History:   Diagnosis Date    Anxiety     Asthma     dx 2019 / has smoked since age 14    CHF (congestive heart failure) (HCC)     Chronic back pain     Bilateral L5 S1 Radic on emg--surprisingly worse on the left than the right--pt's symptoms and her MRI show worse on the right    Chronic obstructive pulmonary disease with acute exacerbation ([facility] [address] 10/12/2019    Depression     Fibromyalgia     Hyperlipidemia     meds > 8 yrs    Hypertension     meds > 45 yrs    On home O2     2l per n/c at bedtime mostly,     Osteoarthritis     Type II diabetes mellitus, uncontrolled ([address]     hx > 8 yrs    Unspecified sleep apnea        Past Surgical History:   Procedure Laterality Date    BACK SURGERY  2017    lumbar disc    CARDIAC CATHETERIZATION  11/3/14     DR. MIRELES / no stents    COLONOSCOPY  2016    w/polypectomy     COLONOSCOPY N/A 2020    COLONOSCOPY WITH POLYPECTOMY performed by Kong Tafoya MD at [facility] N/A 10/7/2019    EUA HYSTEROSCOPY DILATATION AND CURETTAGE performed by Emily Grant DO at [facility]. [address], COLON, DIAGNOSTIC      EYE SURGERY      [place] with IOL OU / [address] [place]  [address]    umbilical hernia repair    NE ESOPHAGOGASTRODUODENOSCOPY TRANSORAL DIAGNOSTIC N/A 3/24/2017    EGD ESOPHAGOGASTRODUODENOSCOPY performed by Pan Valente MD at [facility] N/A 2018    negative findings    NE REVISE MEDIAN N/CARPAL TUNNEL SURG Left 2017    LEFT  CARPAL TUNNEL RELEASE performed by Beverly Pradhan MD at [facility] OACQ,0+P/D,DIZRY N/A 2018    TONSILLECTOMY      as child    UPPER GASTROINTESTINAL ENDOSCOPY  2016    w/bx     UPPER GASTROINTESTINAL ENDOSCOPY N/A 2020    EGD possible biopsy performed by Kong Tafoya MD at [place] [id number] N/A 2020    EGD PUSH ENTEROSCOPY performed by Maddison Vasquez MD at [facility]       Family History   Problem Relation Age of Onset    Heart Disease Father         cardiac bypass    Arthritis Father     Arthritis Mother     Other Mother          at age 80    Other Sister         [facility]    No Known Problems Daughter     Stroke Son        Social History     Socioeconomic History    Marital status:      Spouse name: Kedar Hamilton Number of children: 2    Years of education: 15    Highest education level: High school graduate   Occupational History    Occupation: patient enjoys [employer]       Allergies   Allergen Reactions    Ibuprofen Nausea Only    Metformin And Related      Diarrhea      Darvon [Propoxyphene Hcl] Nausea And Vomiting       Current Outpatient Medications   Medication Sig Dispense Refill    verapamil (CALAN SR) 120 MG extended release tablet Take 1 tablet by mouth daily 90 tablet 3    cloNIDine (CATAPRES) 0.2 MG tablet Take 1 tablet by mouth 2 times daily 180 tablet 3    insulin lispro (HUMALOG) 100 UNIT/ML injection vial 15 units at each mels 1 vial 3    rosuvastatin (CRESTOR) 40 MG tablet TAKE 1 TABLET BY MOUTH ONCE DAILY IN THE EVENING 90 tablet 0    torsemide (DEMADEX) 100 MG tablet Take 0.5 tablets by mouth daily 30 tablet 0    PARoxetine (PAXIL) 40 MG tablet TAKE 1 TABLET BY MOUTH ONCE DAILY IN THE MORNING 90 tablet 1    insulin glargine (LANTUS) 100 UNIT/ML injection vial 60 units at bedtime 3 vial 3    ACCU-CHEK PHYLLIS PLUS strip Test 3x daily Dx E11.65 100 each 3    Accu-Chek Softclix Lancets MISC bid 100 each 3    Blood Glucose Monitoring Suppl (ACCU-CHEK PHYLLIS CONNECT) w/Device KIT 1 kit by Does not apply route daily 1 kit 0    blood glucose test strips (ACCU-CHEK PHYLLIS PLUS) strip Patient test 3x daily E65.11 100 each 3    ipratropium-albuterol (DUONEB) 0.5-2.5 (3) MG/3ML SOLN nebulizer solution Inhale 3 mLs into the lungs 3 times daily 360 mL 0    Continuous Blood Gluc Sensor (FREESTYLE MURALI 14 DAY SENSOR) MISC Use every 2 weeks 2 each 1    Continuous Blood Gluc  (FREESTYLE MURALI 14 DAY READER) [place] As directed 1 Device 0    traMADol (ULTRAM) 50 MG tablet Take 50 mg by mouth every 6 hours as needed for Pain.       isosorbide mononitrate (IMDUR) 60 MG extended release tablet Take 1 tablet by mouth daily 30 tablet 3    carvedilol (COREG) 25 MG tablet Take 1 tablet by mouth 2 times daily 60 tablet 3    spironolactone (ALDACTONE) 25 MG tablet Take 1 tablet by mouth daily 30 tablet 3    pantoprazole (PROTONIX) 40 MG tablet Take 40mg twice daily (1st dose 30min before breakfast) for 30 days. After 30 days, you make take 40mg once daily before breakfast. 60 tablet 3    dicyclomine (BENTYL) 10 MG capsule Take 1 capsule by mouth 4 times daily as needed (abdominal pain) 120 capsule 3    ondansetron (ZOFRAN) 4 MG tablet Take 1 tablet by mouth every 8 hours as needed for Nausea or Vomiting 30 tablet 2     No current facility-administered medications for this visit. Review of Systems:   Review of Systems   Constitutional: Negative. Negative for diaphoresis and fatigue. HENT: Negative. Eyes: Negative. Respiratory: Negative. Negative for cough, chest tightness, shortness of breath, wheezing and stridor. Cardiovascular: Negative. Negative for chest pain, palpitations and leg swelling. Gastrointestinal: Negative. Negative for blood in stool and nausea. Genitourinary: Negative. Musculoskeletal: Negative. Skin: Negative. Neurological: Negative. Negative for dizziness, syncope, weakness and light-headedness. Hematological: Negative. Psychiatric/Behavioral: Negative.           Physical Examination:    LMP  (LMP Unknown)    Physical Exam    LABS:  CBC:   Lab Results   Component Value Date    WBC 5.3 11/30/2020    RBC 4.21 11/30/2020    HGB 11.1 11/30/2020    HCT 35.6 11/30/2020    MCV 84.4 11/30/2020    MCH 26.4 11/30/2020    MCHC 31.2 11/30/2020    RDW 19.4 11/30/2020     11/30/2020    MPV 8.8 08/01/2015     Lipids:  Lab Results   Component Value Date    CHOL 143 11/30/2020    CHOL 105 02/24/2020    CHOL 158 10/13/2019     Lab Results   Component Value Date    TRIG 64 11/30/2020    TRIG 67 02/24/2020    TRIG 118 10/13/2019     Lab Results   Component Value Date    HDL 61 (H) 11/30/2020    HDL 42 02/24/2020    HDL 61 (H) 10/13/2019     Lab Results   Component Value Date    LDLCALC 69 11/30/2020    LDLCALC 50 02/24/2020    [address] 10/13/2019     No results found for: LABVLDL, VLDL  Lab Results Component Value Date    CHOLHDLRATIO 3.4 09/11/2012     CMP:    Lab Results   Component Value Date     11/30/2020    K 5.4 11/30/2020    K 4.4 10/19/2020     11/30/2020    CO2 24 11/30/2020    BUN 56 11/30/2020    CREATININE 3.06 11/30/2020    GFRAA 17.9 11/30/2020    LABGLOM 14.8 11/30/2020    GLUCOSE 241 11/30/2020    PROT 7.0 11/30/2020    LABALBU 4.1 11/30/2020    CALCIUM 9.7 11/30/2020    BILITOT <0.2 11/30/2020    ALKPHOS 150 11/30/2020    AST 22 11/30/2020    ALT 21 11/30/2020     BMP:    Lab Results   Component Value Date     11/30/2020    K 5.4 11/30/2020    K 4.4 10/19/2020     11/30/2020    CO2 24 11/30/2020    BUN 56 11/30/2020    LABALBU 4.1 11/30/2020    CREATININE 3.06 11/30/2020    CALCIUM 9.7 11/30/2020    GFRAA 17.9 11/30/2020    LABGLOM 14.8 11/30/2020    GLUCOSE 241 11/30/2020     Magnesium:    Lab Results   Component Value Date    MG 1.8 10/15/2020     TSH:  Lab Results   Component Value Date    TSH 0.474 11/30/2020       Patient Active Problem List   Diagnosis    Hypertension    Hyperlipidemia    Uncontrolled type 2 diabetes mellitus with complication, without long-term current use of insulin (HCC)    Fibromyalgia    Anxiety    Smoker    Insomnia    DJD (degenerative joint disease), lumbar    Lumbosacral radiculopathy at S1    DDD (degenerative disc disease), lumbar    Dysphonia    CTS (carpal tunnel syndrome)    High risk medication use-Norco - 12/20/17 OARRS PM&R, 02/20/18 OARRS PM&R, 03/07/18 OARRS PM&R, Urine Drug screen negative 02/06/17 PM&R--MED CONTRACT 2/6/17    SOB (shortness of breath) on exertion    Chest pain    Memory deficit    Artificial lens present    Presbyopia    Astigmatism, regular    Cataract, nuclear sclerotic senile    IDDM (insulin dependent diabetes mellitus)    Regular astigmatism    Nuclear senile cataract    Neck pain    Lumbosacral radiculopathy at L5    Spinal stenosis of lumbar region with neurogenic claudication    Impaired mobility and activities of daily living    Non-compliant patient NO showed FU 1/10/17 Dr Phillips Arm Insomnia secondary to chronic pain    Reactive depression    Diabetic radiculopathy (HCC)    Cervical radicular pain    Diabetic asymmetric polyneuropathy (HCC)    Myalgia    Intercostal neuropathy    Osteoarthritis of spine with radiculopathy, lumbar region    Acute combined systolic and diastolic CHF, NYHA class 1 (HCC)    PMB (postmenopausal bleeding)    Acute on chronic diastolic heart failure (HCC)    [facility]) ([facility]    Hypertensive urgency    Uncontrolled type 2 diabetes mellitus with hyperglycemia (HCC)    Chronic obstructive pulmonary disease with acute exacerbation (HCC)    Acute on chronic diastolic (congestive) heart failure (HCC)    SAÚL (acute kidney injury) ([facility] [address]    Hypoglycemia    HOCM (hypertrophic obstructive cardiomyopathy) ([facility])    Gastrointestinal hemorrhage    COPD exacerbation ([facility])    Anemia    AVM (arteriovenous malformation)    Symptomatic anemia    Flash pulmonary edema (HCC)    Acute on chronic kidney failure (HCC)    Dysarthria    Chronic fatigue    Acute encephalopathy    Adenomatous polyp of sigmoid colon    Adenomatous polyp of transverse colon    Sepsis (HCC)    Major depressive disorder in remission ([facility] [address]    Gastroesophageal reflux disease without esophagitis    Acute cystitis without hematuria    CKD (chronic kidney disease) stage 4, GFR 15-29 ml/min (HCC)    Cardiopulmonary arrest (HCC)    Gait abnormality    Late effects of CVA (cerebrovascular accident)       Medications Discontinued During This Encounter   Medication Reason    verapamil (CALAN SR) 120 MG extended release tablet REORDER    cloNIDine (CATAPRES) 0.2 MG tablet REORDER       Modified Medications    Modified Medication Previous Medication    CLONIDINE (CATAPRES) 0.2 MG TABLET cloNIDine (CATAPRES) 0.2 MG tablet       Take 1 tablet by mouth 2 times daily    Take 1 tablet by mouth 2 times daily    VERAPAMIL (CALAN SR) 120 MG EXTENDED RELEASE TABLET verapamil (CALAN SR) 120 MG extended release tablet       Take 1 tablet by mouth daily    Take 1 tablet by mouth nightly       Orders Placed This Encounter   Medications    verapamil (CALAN SR) 120 MG extended release tablet     Sig: Take 1 tablet by mouth daily     Dispense:  90 tablet     Refill:  3    cloNIDine (CATAPRES) 0.2 MG tablet     Sig: Take 1 tablet by mouth 2 times daily     Dispense:  180 tablet     Refill:  3       Assessment/Plan:    1. Acute combined systolic and diastolic CHF, NYHA class 1 (HCC)   compensated   - Basic Metabolic Panel; Future    2. Essential hypertension   not controlled- refills of Verapamil and Clonidine refilled    3. Mixed hyperlipidemia     4. HOCM (hypertrophic obstructive cardiomyopathy) (HCC)  Continue BB ACEI. And Dig. Counseling:  Heart Healthy Lifestyle, Low Salt Diet, Take Precautions to Prevent Falls and Walk Daily    Return in about 1 month (around 4/11/2021). Pursuant to the emergency declaration under the [address] [place] [address], [facility] waiver authority and the [employer] Act, this Virtual Visit was conducted, with patient's consent, to reduce the patient's risk of exposure to COVID-19 and provide continuity of care for an established patient. Services were provided through a video synchronous discussion virtually to substitute for in-person clinic visit. Visit completed using [employer] me. Patient was located at home and I was located in the clinic.   Electronically signed by Dg Feldman MD on 3/11/2021 at 2:09 PM

## 2021-03-15 DIAGNOSIS — Z79.4 TYPE 2 DIABETES MELLITUS WITH OTHER SPECIFIED COMPLICATION, WITH LONG-TERM CURRENT USE OF INSULIN (HCC): ICD-10-CM

## 2021-03-15 DIAGNOSIS — E11.69 TYPE 2 DIABETES MELLITUS WITH OTHER SPECIFIED COMPLICATION, WITH LONG-TERM CURRENT USE OF INSULIN (HCC): ICD-10-CM

## 2021-03-15 RX ORDER — INSULIN GLARGINE 100 [IU]/ML
INJECTION, SOLUTION SUBCUTANEOUS
Qty: 3 VIAL | Refills: 3 | Status: SHIPPED | OUTPATIENT
Start: 2021-03-15 | End: 2021-03-17 | Stop reason: SDUPTHER

## 2021-03-16 ENCOUNTER — VIRTUAL VISIT (OUTPATIENT)
Dept: FAMILY MEDICINE CLINIC | Age: 76
End: 2021-03-16
Payer: MEDICARE

## 2021-03-16 DIAGNOSIS — R74.8 ALKALINE PHOSPHATASE ELEVATION: ICD-10-CM

## 2021-03-16 DIAGNOSIS — F33.42 MAJOR DEPRESSIVE DISORDER, RECURRENT, IN FULL REMISSION (HCC): Primary | ICD-10-CM

## 2021-03-16 PROCEDURE — G8427 DOCREV CUR MEDS BY ELIG CLIN: HCPCS | Performed by: FAMILY MEDICINE

## 2021-03-16 PROCEDURE — 4040F PNEUMOC VAC/ADMIN/RCVD: CPT | Performed by: FAMILY MEDICINE

## 2021-03-16 PROCEDURE — 1090F PRES/ABSN URINE INCON ASSESS: CPT | Performed by: FAMILY MEDICINE

## 2021-03-16 PROCEDURE — 99213 OFFICE O/P EST LOW 20 MIN: CPT | Performed by: FAMILY MEDICINE

## 2021-03-16 PROCEDURE — 1123F ACP DISCUSS/DSCN MKR DOCD: CPT | Performed by: FAMILY MEDICINE

## 2021-03-16 PROCEDURE — G8400 PT W/DXA NO RESULTS DOC: HCPCS | Performed by: FAMILY MEDICINE

## 2021-03-16 ASSESSMENT — ENCOUNTER SYMPTOMS
VOMITING: 0
COUGH: 0
DIARRHEA: 0

## 2021-03-16 ASSESSMENT — PATIENT HEALTH QUESTIONNAIRE - PHQ9
SUM OF ALL RESPONSES TO PHQ QUESTIONS 1-9: 0
SUM OF ALL RESPONSES TO PHQ9 QUESTIONS 1 & 2: 0
SUM OF ALL RESPONSES TO PHQ QUESTIONS 1-9: 0
2. FEELING DOWN, DEPRESSED OR HOPELESS: 0
1. LITTLE INTEREST OR PLEASURE IN DOING THINGS: 0

## 2021-03-16 NOTE — PROGRESS NOTES
Assistance: Needs assistance    Homemaking Assistance: Needs assistance    Homemaking Responsibilities: No(spouse performs)    Ambulation Assistance: Independent    Transfer Assistance: Independent    Active : No    Occupation: Retired    Type of occupation: worked in [employer]: patient enjoys [employer]     Current Outpatient Medications   Medication Sig Dispense Refill    insulin glargine (LANTUS) 100 UNIT/ML injection vial 60 units at bedtime 3 vial 3    verapamil (CALAN SR) 120 MG extended release tablet Take 1 tablet by mouth daily 90 tablet 3    cloNIDine (CATAPRES) 0.2 MG tablet Take 1 tablet by mouth 2 times daily 180 tablet 3    insulin lispro (HUMALOG) 100 UNIT/ML injection vial 15 units at each mels 1 vial 3    rosuvastatin (CRESTOR) 40 MG tablet TAKE 1 TABLET BY MOUTH ONCE DAILY IN THE EVENING 90 tablet 0    torsemide (DEMADEX) 100 MG tablet Take 0.5 tablets by mouth daily 30 tablet 0    PARoxetine (PAXIL) 40 MG tablet TAKE 1 TABLET BY MOUTH ONCE DAILY IN THE MORNING 90 tablet 1    ACCU-CHEK PHYLLIS PLUS strip Test 3x daily Dx E11.65 100 each 3    Accu-Chek Softclix Lancets MISC bid 100 each 3    Blood Glucose Monitoring Suppl (ACCU-CHEK PHYLLIS CONNECT) w/Device KIT 1 kit by Does not apply route daily 1 kit 0    blood glucose test strips (ACCU-CHEK PHYLLIS PLUS) strip Patient test 3x daily E65.11 100 each 3    ipratropium-albuterol (DUONEB) 0.5-2.5 (3) MG/3ML SOLN nebulizer solution Inhale 3 mLs into the lungs 3 times daily 360 mL 0    Continuous Blood Gluc Sensor (FREESTYLE MURALI 14 DAY SENSOR) [facility] Use every 2 weeks 2 each 1    Continuous Blood Gluc  (FREESTYLE MURALI 14 DAY READER) [place] As directed 1 Device 0    traMADol (ULTRAM) 50 MG tablet Take 50 mg by mouth every 6 hours as needed for Pain.       isosorbide mononitrate (IMDUR) 60 MG extended release tablet Take 1 tablet by mouth daily 30 tablet 3    carvedilol (COREG) 25 MG tablet Take 1 tablet by mouth 2 times daily 60 tablet 3    spironolactone (ALDACTONE) 25 MG tablet Take 1 tablet by mouth daily 30 tablet 3    pantoprazole (PROTONIX) 40 MG tablet Take 40mg twice daily (1st dose 30min before breakfast) for 30 days. After 30 days, you make take 40mg once daily before breakfast. 60 tablet 3    dicyclomine (BENTYL) 10 MG capsule Take 1 capsule by mouth 4 times daily as needed (abdominal pain) 120 capsule 3    ondansetron (ZOFRAN) 4 MG tablet Take 1 tablet by mouth every 8 hours as needed for Nausea or Vomiting 30 tablet 2     No current facility-administered medications for this visit. Family History   Problem Relation Age of Onset    Heart Disease Father         cardiac bypass    Arthritis Father     Arthritis Mother     Other Mother          at age 80    Other Sister         [facility]    No Known Problems Daughter     Stroke Son      Past Medical History:   Diagnosis Date    Anxiety     Asthma     dx  / has smoked since age 14    CHF (congestive heart failure) ([address]     Chronic back pain     Bilateral L5 S1 Radic on emg--surprisingly worse on the left than the right--pt's symptoms and her MRI show worse on the right    Chronic obstructive pulmonary disease with acute exacerbation ([address] 10/12/2019    Depression     Fibromyalgia     Hyperlipidemia     meds > 8 yrs    Hypertension     meds > 39 yrs    On home O2     2l per n/c at bedtime mostly,     Osteoarthritis     Type II diabetes mellitus, uncontrolled ([address]     hx > 8 yrs    Unspecified sleep apnea      Objective:   LMP  (LMP Unknown)     Physical Exam  Patient with appropriate affect. Alert    Thought content appropriate  Good eye contact    Gen:   NAD  Neuro; Ambulated inside house without walking aid  Pulmonary; Respirations unlabored   Assessment:          Diagnosis Orders   1. Major depressive disorder, recurrent, in full remission ([address]     2.  Alkaline phosphatase elevation  Hepatic Function Panel Plan:    f/u with multiple specialists as already planned   Orders Placed This Encounter   Procedures    Hepatic Function Panel     Standing Status:   Future     Standing Expiration Date:   3/16/2022   f/u in 2 months -may need ct done if enzyme still elevated

## 2021-03-17 ENCOUNTER — VIRTUAL VISIT (OUTPATIENT)
Dept: ENDOCRINOLOGY | Age: 76
End: 2021-03-17
Payer: MEDICARE

## 2021-03-17 DIAGNOSIS — E11.69 TYPE 2 DIABETES MELLITUS WITH OTHER SPECIFIED COMPLICATION, WITH LONG-TERM CURRENT USE OF INSULIN (HCC): ICD-10-CM

## 2021-03-17 DIAGNOSIS — Z79.4 TYPE 2 DIABETES MELLITUS WITH OTHER SPECIFIED COMPLICATION, WITH LONG-TERM CURRENT USE OF INSULIN (HCC): ICD-10-CM

## 2021-03-17 PROCEDURE — 1090F PRES/ABSN URINE INCON ASSESS: CPT | Performed by: INTERNAL MEDICINE

## 2021-03-17 PROCEDURE — 99213 OFFICE O/P EST LOW 20 MIN: CPT | Performed by: INTERNAL MEDICINE

## 2021-03-17 PROCEDURE — 1123F ACP DISCUSS/DSCN MKR DOCD: CPT | Performed by: INTERNAL MEDICINE

## 2021-03-17 PROCEDURE — 4040F PNEUMOC VAC/ADMIN/RCVD: CPT | Performed by: INTERNAL MEDICINE

## 2021-03-17 PROCEDURE — G8428 CUR MEDS NOT DOCUMENT: HCPCS | Performed by: INTERNAL MEDICINE

## 2021-03-17 PROCEDURE — G8400 PT W/DXA NO RESULTS DOC: HCPCS | Performed by: INTERNAL MEDICINE

## 2021-03-17 RX ORDER — INSULIN GLARGINE 100 [IU]/ML
INJECTION, SOLUTION SUBCUTANEOUS
Qty: 3 VIAL | Refills: 3
Start: 2021-03-17 | End: 2021-06-24 | Stop reason: SDUPTHER

## 2021-03-17 NOTE — PROGRESS NOTES
3/17/2021    TELEHEALTH EVALUATION -- Audio/Visual (During SBFAT-60 public health emergency)    Due to [place] 19 outbreak, patient's office visit was converted to a virtual visit. Patient was contacted and agreed to proceed with a virtual visit via [email or web address]  The risks and benefits of converting to a virtual visit were discussed in light of the current infectious disease epidemic. Patient also understood that insurance coverage and co-pays are up to their individual insurance plans. HPI: Video visit patient was at home I was at my office     [place] (:  [id number]) has requested an audio/video evaluation for the following concern(s):    Follow-up on type 2 diabetes patient is on Lantus 60 units at bedtime Humalog 15 with each meals fasting and in the upper 100 postprandials are in the mid to upper 200 range last hemoglobin A1c was done in October it was 9.2 no hypoglycemia    Complications diabetes include chronic kidney disease    Lab Results   Component Value Date    LABA1C 9.2 (H) 10/13/2020         Results for Hetal Ivy (MRN [id number]) as of 3/17/2021 11:43   Ref.  Range 2020 11:19   Sodium Latest Ref Range: 135 - 144 mEq/L 137   Potassium Latest Ref Range: 3.4 - 4.9 mEq/L 5.4 (H)   Chloride Latest Ref Range: 95 - 107 mEq/L 100   CO2 Latest Ref Range: 20 - 31 mEq/L 24   BUN Latest Ref Range: 8 - 23 mg/dL 56 (H)   Creatinine Latest Ref Range: 0.50 - 0.90 mg/dL 3.06 (H)   Anion Gap Latest Ref Range: 9 - 15 mEq/L 13   GFR Non- Latest Ref Range: >60  14.8 (L)   GFR  Latest Ref Range: >60  17.9 (L)   Glucose Latest Ref Range: 70 - 99 mg/dL 241 (H)   Calcium Latest Ref Range: 8.5 - 9.9 mg/dL 9.7   Total Protein Latest Ref Range: 6.3 - 8.0 g/dL 7.0   Cholesterol, Total Latest Ref Range: 0 - 199 mg/dL 143   HDL Cholesterol Latest Ref Range: 40 - 59 mg/dL 61 (H)   LDL Calculated Latest Ref Range: 0 - 129 mg/dL 69   Triglycerides Latest Ref Range: 0 - 150 mg/dL 64 Albumin Latest Ref Range: 3.5 - 4.6 g/dL 4.1   Globulin Latest Ref Range: 2.3 - 3.5 g/dL 2.9   Alk Phos Latest Ref Range: 40 - 130 U/L 150 (H)   ALT Latest Ref Range: 0 - 33 U/L 21   AST Latest Ref Range: 0 - 35 U/L 22   Bilirubin Latest Ref Range: 0.2 - 0.7 mg/dL <0.2   TSH Latest Ref Range: 0.440 - 3.860 uIU/mL 0.474     Patient Active Problem List   Diagnosis    Hypertension    Hyperlipidemia    Uncontrolled type 2 diabetes mellitus with complication, without long-term current use of insulin (HCC)    Fibromyalgia    Anxiety    Smoker    Insomnia    DJD (degenerative joint disease), lumbar    Lumbosacral radiculopathy at S1    DDD (degenerative disc disease), lumbar    Dysphonia    CTS (carpal tunnel syndrome)    High risk medication use-Norco - 12/20/17 [facility] PM&R, 02/20/18 O[facility] PM&R, 03/07/18 O[facility] PM&R, Urine Drug screen negative 02/06/17 PM&R--MED CONTRACT 2/6/17    SOB (shortness of breath) on exertion    Chest pain    Memory deficit    Artificial lens present    Presbyopia    Astigmatism, regular    Cataract, nuclear sclerotic senile    IDDM (insulin dependent diabetes mellitus)    Regular astigmatism    Nuclear senile cataract    Neck pain    Lumbosacral radiculopathy at L5    Spinal stenosis of lumbar region with neurogenic claudication    Impaired mobility and activities of daily living    Non-compliant patient NO showed FU 1/10/17 Dr Anson Ferro Insomnia secondary to chronic pain    Reactive depression    Diabetic radiculopathy (HCC)    Cervical radicular pain    Diabetic asymmetric polyneuropathy (HCC)    Myalgia    Intercostal neuropathy    Osteoarthritis of spine with radiculopathy, lumbar region    Acute combined systolic and diastolic CHF, NYHA class 1 (HCC)    PMB (postmenopausal bleeding)    Acute on chronic diastolic heart failure (HCC)    CHF (congestive heart failure) (HCC)    Hypertensive urgency    Uncontrolled type 2 diabetes mellitus with hyperglycemia (HCC)    Chronic obstructive pulmonary disease with acute exacerbation (HCC)    Acute on chronic diastolic (congestive) heart failure (HCC)    SAÚL (acute kidney injury) ([facility] [address]    Hypoglycemia    HOCM (hypertrophic obstructive cardiomyopathy) (HCC)    Gastrointestinal hemorrhage    COPD exacerbation (HCC)    Anemia    AVM (arteriovenous malformation)    Symptomatic anemia    Flash pulmonary edema (HCC)    Acute on chronic kidney failure (HCC)    Dysarthria    Chronic fatigue    Acute encephalopathy    Adenomatous polyp of sigmoid colon    Adenomatous polyp of transverse colon    Sepsis (HCC)    Major depressive disorder in remission ([facility] [address]    Gastroesophageal reflux disease without esophagitis    Acute cystitis without hematuria    CKD (chronic kidney disease) stage 4, GFR 15-29 ml/min (HCC)    Cardiopulmonary arrest (HCC)    Gait abnormality    Late effects of CVA (cerebrovascular accident)         Review of Systems    Prior to Visit Medications    Medication Sig Taking?  Authorizing Provider   insulin glargine (LANTUS) 100 UNIT/ML injection vial 60 units at bedtime  Lyle Scott MD   verapamil (CALAN SR) 120 MG extended release tablet Take 1 tablet by mouth daily  Jo Ann Whelan MD   cloNIDine (CATAPRES) 0.2 MG tablet Take 1 tablet by mouth 2 times daily  Jo Ann Whelan MD   insulin lispro (HUMALOG) 100 UNIT/ML injection vial 15 units at each Nicolette MD Elizabeth   rosuvastatin (CRESTOR) 40 MG tablet TAKE 1 TABLET BY MOUTH ONCE DAILY IN THE EVENING  Dulce Flores MD   torsemide (DEMADEX) 100 MG tablet Take 0.5 tablets by mouth daily  Dulce Flores MD   PARoxetine (PAXIL) 40 MG tablet TAKE 1 TABLET BY MOUTH ONCE DAILY IN THE MORNING  Dulce Flores MD   ACCU-CHEK PHYLLIS PLUS strip Test 3x daily Dx E11.65  Macario Christiansen MD   Accu-Chek Softclix Lancets MISC bid  Macario Christiansen MD   Blood Glucose Monitoring Suppl (ACCU-CHEK PHYLLIS CONNECT) w/Device KIT 1 kit by Does not apply route daily  Heidi Hector MD   blood glucose test strips (ACCU-CHEK PHYLLIS PLUS) strip Patient test 3x daily E65.11  Heidi Hector MD   ipratropium-albuterol (DUONEB) 0.5-2.5 (3) MG/3ML SOLN nebulizer solution Inhale 3 mLs into the lungs 3 times daily  [place] MD Adiel   Continuous Blood Gluc Sensor (FREESTYLE MURALI 14 DAY SENSOR) MISC Use every 2 weeks  Heidi Hector MD   Continuous Blood Gluc  (FREESTYLE MURALI 14 DAY READER) [place] As directed  Heidi Hector MD   traMADol (ULTRAM) 50 MG tablet Take 50 mg by mouth every 6 hours as needed for Pain. Historical Provider, MD   isosorbide mononitrate (IMDUR) 60 MG extended release tablet Take 1 tablet by mouth daily  Ange Rondon,    carvedilol (COREG) 25 MG tablet Take 1 tablet by mouth 2 times daily  Ange Rondon, DO   spironolactone (ALDACTONE) 25 MG tablet Take 1 tablet by mouth daily  Ange Rondon DO   pantoprazole (PROTONIX) 40 MG tablet Take 40mg twice daily (1st dose 30min before breakfast) for 30 days. After 30 days, you make take 40mg once daily before breakfast.  Ange Rondon,    dicyclomine (BENTYL) 10 MG capsule Take 1 capsule by mouth 4 times daily as needed (abdominal pain)  JOHNATHON Martinez CNP   ondansetron (ZOFRAN) 4 MG tablet Take 1 tablet by mouth every 8 hours as needed for Nausea or Vomiting  JOHNATHON Ascencio CNP       Social History     Tobacco Use    Smoking status: Former Smoker     Packs/day: 1.00     Years: 59.00     Pack years: 59.00     Types: Cigarettes     Start date: 11/30/2017    Smokeless tobacco: Never Used    Tobacco comment: quit 2-3 weeks ago    Substance Use Topics    Alcohol use: Not Currently    Drug use:  No            PHYSICAL EXAMINATION:  [ INSTRUCTIONS:  \"[x]\" Indicates a positive item  \"[]\" Indicates a negative item  -- DELETE ALL ITEMS NOT EXAMINED]  [] Alert  [] Oriented to person/place/time    [] No apparent distress  [] Toxic appearing    [] Face flushed appearing [] Sclera clear  [] Lips are cyanotic      [] Breathing appears normal  [] Appears tachypneic      [] Rash on visible skin    [] Cranial Nerves II-XII grossly intact    [] Motor grossly intact in visible upper extremities    [] Motor grossly intact in visible lower extremities    [] Normal Mood  [] Anxious appearing    [] Depressed appearing  [] Confused appearing      [] Poor short term memory  [] Poor long term memory    [] OTHER:      Due to this being a TeleHealth encounter, evaluation of the following organ systems is limited: Vitals/Constitutional/EENT/Resp/CV/GI//MS/Neuro/Skin/Heme-Lymph-Imm. ASSESSMENT/PLAN:       Diagnosis Orders   1. Type 2 diabetes mellitus with other specified complication, with long-term current use of insulin (HCC)  insulin glargine (LANTUS) 100 UNIT/ML injection vial    Basic Metabolic Panel    Hemoglobin A1C     Orders Placed This Encounter   Procedures    Basic Metabolic Panel     Standing Status:   Future     Standing Expiration Date:   3/17/2022    Hemoglobin A1C     Standing Status:   Future     Standing Expiration Date:   3/17/2022     Orders Placed This Encounter   Medications    insulin lispro (HUMALOG) 100 UNIT/ML injection vial     Si units at each mels     Dispense:  1 vial     Refill:  3    insulin glargine (LANTUS) 100 UNIT/ML injection vial     Si units at bedtime     Dispense:  3 vial     Refill:  3     continue Lantus 60 units at bedtime Humalog increased to 18 with each meals patient to have labs done in the next 2 months time A1c goal of 7.5-8.5 avoid hypoglycemia        An  electronic signature was used to authenticate this note.     --Macario Christiansen MD on 3/17/2021 at 11:43 AM        Pursuant to the emergency declaration under the [facility] waiver authority and the [employer] and [employer] Act, this Virtual  Visit was conducted, with patient's consent, to reduce the patient's risk of exposure to COVID-19 and provide continuity of care for an established patient. Services were provided through a video synchronous discussion virtually to substitute for in-person clinic visit.

## 2021-03-18 ENCOUNTER — APPOINTMENT (OUTPATIENT)
Dept: GENERAL RADIOLOGY | Age: 76
DRG: 296 | End: 2021-03-18
Payer: MEDICARE

## 2021-03-18 ENCOUNTER — HOSPITAL ENCOUNTER (INPATIENT)
Age: 76
LOS: 15 days | Discharge: HOME | DRG: 296 | End: 2021-04-02
Attending: EMERGENCY MEDICINE | Admitting: INTERNAL MEDICINE
Payer: MEDICARE

## 2021-03-18 ENCOUNTER — VIRTUAL VISIT (OUTPATIENT)
Dept: CARDIOLOGY CLINIC | Age: 76
End: 2021-03-18
Payer: MEDICARE

## 2021-03-18 ENCOUNTER — TELEPHONE (OUTPATIENT)
Dept: CARDIOLOGY CLINIC | Age: 76
End: 2021-03-18

## 2021-03-18 DIAGNOSIS — D64.9 ANEMIA, UNSPECIFIED TYPE: ICD-10-CM

## 2021-03-18 DIAGNOSIS — R00.1 BRADYCARDIA: ICD-10-CM

## 2021-03-18 DIAGNOSIS — J96.01 ACUTE RESPIRATORY FAILURE WITH HYPOXIA (HCC): ICD-10-CM

## 2021-03-18 DIAGNOSIS — N18.4 STAGE 4 CHRONIC KIDNEY DISEASE (HCC): ICD-10-CM

## 2021-03-18 DIAGNOSIS — I10 ESSENTIAL HYPERTENSION: ICD-10-CM

## 2021-03-18 DIAGNOSIS — I50.41 ACUTE COMBINED SYSTOLIC AND DIASTOLIC CHF, NYHA CLASS 1 (HCC): Primary | ICD-10-CM

## 2021-03-18 DIAGNOSIS — R06.02 SOB (SHORTNESS OF BREATH) ON EXERTION: ICD-10-CM

## 2021-03-18 DIAGNOSIS — E78.2 MIXED HYPERLIPIDEMIA: ICD-10-CM

## 2021-03-18 DIAGNOSIS — I95.2 HYPOTENSION DUE TO DRUGS: ICD-10-CM

## 2021-03-18 DIAGNOSIS — I42.1 HOCM (HYPERTROPHIC OBSTRUCTIVE CARDIOMYOPATHY) (HCC): ICD-10-CM

## 2021-03-18 DIAGNOSIS — I16.0 HYPERTENSIVE URGENCY: ICD-10-CM

## 2021-03-18 DIAGNOSIS — I46.9 CARDIOPULMONARY ARREST (HCC): Primary | ICD-10-CM

## 2021-03-18 DIAGNOSIS — I25.10 CORONARY ARTERY DISEASE INVOLVING NATIVE CORONARY ARTERY OF NATIVE HEART WITHOUT ANGINA PECTORIS: ICD-10-CM

## 2021-03-18 LAB
ABO/RH: NORMAL
ALBUMIN SERPL-MCNC: 2 G/DL (ref 3.5–4.6)
ALP BLD-CCNC: 56 U/L (ref 40–130)
ALT SERPL-CCNC: 20 U/L (ref 0–33)
ANION GAP SERPL CALCULATED.3IONS-SCNC: 11 MEQ/L (ref 9–15)
ANTIBODY SCREEN: NORMAL
AST SERPL-CCNC: 26 U/L (ref 0–35)
BASE EXCESS ARTERIAL: -14 (ref -3–3)
BASE EXCESS ARTERIAL: -7 (ref -3–3)
BASE EXCESS VENOUS: -11 (ref -3–3)
BASOPHILS ABSOLUTE: 0 K/UL (ref 0–0.2)
BASOPHILS RELATIVE PERCENT: 0.3 %
BILIRUB SERPL-MCNC: 0.3 MG/DL (ref 0.2–0.7)
BLOOD BANK DISPENSE STATUS: NORMAL
BLOOD BANK PRODUCT CODE: NORMAL
BPU ID: NORMAL
BUN BLDV-MCNC: 29 MG/DL (ref 8–23)
CALCIUM IONIZED: 0.97 MMOL/L (ref 1.12–1.32)
CALCIUM IONIZED: 1.16 MMOL/L (ref 1.12–1.32)
CALCIUM IONIZED: 1.29 MMOL/L (ref 1.12–1.32)
CALCIUM SERPL-MCNC: 5.5 MG/DL (ref 8.5–9.9)
CHLORIDE BLD-SCNC: 120 MEQ/L (ref 95–107)
CO2: 13 MEQ/L (ref 20–31)
CREAT SERPL-MCNC: 1.61 MG/DL (ref 0.5–0.9)
D DIMER: 2.24 MG/L FEU (ref 0–0.5)
DESCRIPTION BLOOD BANK: NORMAL
EKG ATRIAL RATE: 45 BPM
EKG Q-T INTERVAL: 486 MS
EKG QRS DURATION: 94 MS
EKG QTC CALCULATION (BAZETT): 420 MS
EKG R AXIS: 83 DEGREES
EKG T AXIS: 85 DEGREES
EKG VENTRICULAR RATE: 45 BPM
EOSINOPHILS ABSOLUTE: 0.1 K/UL (ref 0–0.7)
EOSINOPHILS RELATIVE PERCENT: 1.6 %
ETHANOL PERCENT: NORMAL G/DL
ETHANOL: <10 MG/DL (ref 0–0.08)
GFR AFRICAN AMERICAN: 20
GFR AFRICAN AMERICAN: 22
GFR AFRICAN AMERICAN: 24
GFR AFRICAN AMERICAN: 37.6
GFR NON-AFRICAN AMERICAN: 16
GFR NON-AFRICAN AMERICAN: 18
GFR NON-AFRICAN AMERICAN: 20
GFR NON-AFRICAN AMERICAN: 31.1
GLOBULIN: 1.3 G/DL (ref 2.3–3.5)
GLUCOSE BLD-MCNC: 110 MG/DL (ref 60–115)
GLUCOSE BLD-MCNC: 117 MG/DL (ref 70–99)
GLUCOSE BLD-MCNC: 137 MG/DL (ref 60–115)
GLUCOSE BLD-MCNC: 150 MG/DL (ref 60–115)
GLUCOSE BLD-MCNC: 209 MG/DL (ref 60–115)
HCO3 ARTERIAL: 12.5 MMOL/L (ref 21–29)
HCO3 ARTERIAL: 19.6 MMOL/L (ref 21–29)
HCO3 VENOUS: 16.9 MMOL/L (ref 23–29)
HCT VFR BLD CALC: 18.9 % (ref 37–47)
HEMOGLOBIN: 5.8 G/DL (ref 12–16)
HEMOGLOBIN: 7.8 GM/DL (ref 12–16)
HEMOGLOBIN: 7.8 GM/DL (ref 12–16)
HEMOGLOBIN: 9.1 GM/DL (ref 12–16)
INR BLD: 1.5
LACTATE: 5.18 MMOL/L (ref 0.4–2)
LACTATE: 7.04 MMOL/L (ref 0.4–2)
LACTATE: 7.08 MMOL/L (ref 0.4–2)
LACTIC ACID: 6 MMOL/L (ref 0.5–2.2)
LYMPHOCYTES ABSOLUTE: 2.1 K/UL (ref 1–4.8)
LYMPHOCYTES RELATIVE PERCENT: 29.1 %
MAGNESIUM: 1.6 MG/DL (ref 1.7–2.4)
MCH RBC QN AUTO: 30.9 PG (ref 27–31.3)
MCHC RBC AUTO-ENTMCNC: 30.7 % (ref 33–37)
MCV RBC AUTO: 100.9 FL (ref 82–100)
MONOCYTES ABSOLUTE: 0.3 K/UL (ref 0.2–0.8)
MONOCYTES RELATIVE PERCENT: 3.9 %
NEUTROPHILS ABSOLUTE: 4.6 K/UL (ref 1.4–6.5)
NEUTROPHILS RELATIVE PERCENT: 65.1 %
O2 SAT, ARTERIAL: 100 % (ref 93–100)
O2 SAT, ARTERIAL: 97 % (ref 93–100)
O2 SAT, VEN: 61 %
PCO2 ARTERIAL: 27 MM HG (ref 35–45)
PCO2 ARTERIAL: 44 MM HG (ref 35–45)
PCO2, VEN: 41.1 MM HG (ref 40–50)
PDW BLD-RTO: 17.7 % (ref 11.5–14.5)
PERFORMED ON: ABNORMAL
PH ARTERIAL: 7.26 (ref 7.35–7.45)
PH ARTERIAL: 7.28 (ref 7.35–7.45)
PH VENOUS: 7.22 (ref 7.35–7.45)
PLATELET # BLD: 244 K/UL (ref 130–400)
PO2 ARTERIAL: 104 MM HG (ref 75–108)
PO2 ARTERIAL: 499 MM HG (ref 75–108)
PO2, VEN: 37 MM HG
POC CHLORIDE: 110 MEQ/L (ref 99–110)
POC CHLORIDE: 113 MEQ/L (ref 99–110)
POC CHLORIDE: 113 MEQ/L (ref 99–110)
POC CREATININE: 2.4 MG/DL (ref 0.6–1.2)
POC CREATININE: 2.6 MG/DL (ref 0.6–1.2)
POC CREATININE: 2.8 MG/DL (ref 0.6–1.2)
POC FIO2: 100
POC FIO2: 80
POC HEMATOCRIT: 23 % (ref 36–48)
POC HEMATOCRIT: 23 % (ref 36–48)
POC HEMATOCRIT: 27 % (ref 36–48)
POC POTASSIUM: 6.6 MEQ/L (ref 3.5–5.1)
POC POTASSIUM: 6.9 MEQ/L (ref 3.5–5.1)
POC POTASSIUM: 7.7 MEQ/L (ref 3.5–5.1)
POC SAMPLE TYPE: ABNORMAL
POC SODIUM: 136 MEQ/L (ref 136–145)
POC SODIUM: 140 MEQ/L (ref 136–145)
POC SODIUM: 140 MEQ/L (ref 136–145)
POTASSIUM SERPL-SCNC: 5.2 MEQ/L (ref 3.4–4.9)
PRO-BNP: 756 PG/ML
PROTHROMBIN TIME: 18.3 SEC (ref 12.3–14.9)
RBC # BLD: 1.87 M/UL (ref 4.2–5.4)
SARS-COV-2, NAAT: NOT DETECTED
SLIDE REVIEW: ABNORMAL
SODIUM BLD-SCNC: 144 MEQ/L (ref 135–144)
TCO2 ARTERIAL: 13 (ref 22–29)
TCO2 ARTERIAL: 21 (ref 22–29)
TCO2 CALC VENOUS: 18 MMOL/L
TOTAL PROTEIN: 3.3 G/DL (ref 6.3–8)
TROPONIN: 0.05 NG/ML (ref 0–0.01)
WBC # BLD: 7 K/UL (ref 4.8–10.8)

## 2021-03-18 PROCEDURE — G8400 PT W/DXA NO RESULTS DOC: HCPCS | Performed by: INTERNAL MEDICINE

## 2021-03-18 PROCEDURE — 2580000003 HC RX 258: Performed by: INTERNAL MEDICINE

## 2021-03-18 PROCEDURE — 84132 ASSAY OF SERUM POTASSIUM: CPT

## 2021-03-18 PROCEDURE — 96375 TX/PRO/DX INJ NEW DRUG ADDON: CPT

## 2021-03-18 PROCEDURE — 0BH17EZ INSERTION OF ENDOTRACHEAL AIRWAY INTO TRACHEA, VIA NATURAL OR ARTIFICIAL OPENING: ICD-10-PCS | Performed by: INTERNAL MEDICINE

## 2021-03-18 PROCEDURE — 2500000003 HC RX 250 WO HCPCS: Performed by: INTERNAL MEDICINE

## 2021-03-18 PROCEDURE — 71045 X-RAY EXAM CHEST 1 VIEW: CPT

## 2021-03-18 PROCEDURE — 93005 ELECTROCARDIOGRAM TRACING: CPT | Performed by: EMERGENCY MEDICINE

## 2021-03-18 PROCEDURE — 31500 INSERT EMERGENCY AIRWAY: CPT

## 2021-03-18 PROCEDURE — 6360000002 HC RX W HCPCS: Performed by: EMERGENCY MEDICINE

## 2021-03-18 PROCEDURE — 36556 INSERT NON-TUNNEL CV CATH: CPT

## 2021-03-18 PROCEDURE — 96368 THER/DIAG CONCURRENT INF: CPT

## 2021-03-18 PROCEDURE — 86850 RBC ANTIBODY SCREEN: CPT

## 2021-03-18 PROCEDURE — 96366 THER/PROPH/DIAG IV INF ADDON: CPT

## 2021-03-18 PROCEDURE — 84484 ASSAY OF TROPONIN QUANT: CPT

## 2021-03-18 PROCEDURE — 2500000003 HC RX 250 WO HCPCS: Performed by: NURSE PRACTITIONER

## 2021-03-18 PROCEDURE — 83735 ASSAY OF MAGNESIUM: CPT

## 2021-03-18 PROCEDURE — P9016 RBC LEUKOCYTES REDUCED: HCPCS

## 2021-03-18 PROCEDURE — 84100 ASSAY OF PHOSPHORUS: CPT

## 2021-03-18 PROCEDURE — 87040 BLOOD CULTURE FOR BACTERIA: CPT

## 2021-03-18 PROCEDURE — 82565 ASSAY OF CREATININE: CPT

## 2021-03-18 PROCEDURE — 85610 PROTHROMBIN TIME: CPT

## 2021-03-18 PROCEDURE — 2580000003 HC RX 258: Performed by: NURSE PRACTITIONER

## 2021-03-18 PROCEDURE — 86901 BLOOD TYPING SEROLOGIC RH(D): CPT

## 2021-03-18 PROCEDURE — 51702 INSERT TEMP BLADDER CATH: CPT

## 2021-03-18 PROCEDURE — 2700000000 HC OXYGEN THERAPY PER DAY

## 2021-03-18 PROCEDURE — 36600 WITHDRAWAL OF ARTERIAL BLOOD: CPT

## 2021-03-18 PROCEDURE — 99291 CRITICAL CARE FIRST HOUR: CPT

## 2021-03-18 PROCEDURE — 85025 COMPLETE CBC W/AUTO DIFF WBC: CPT

## 2021-03-18 PROCEDURE — 2580000003 HC RX 258: Performed by: EMERGENCY MEDICINE

## 2021-03-18 PROCEDURE — 2500000003 HC RX 250 WO HCPCS: Performed by: EMERGENCY MEDICINE

## 2021-03-18 PROCEDURE — 84295 ASSAY OF SERUM SODIUM: CPT

## 2021-03-18 PROCEDURE — 85014 HEMATOCRIT: CPT

## 2021-03-18 PROCEDURE — 36430 TRANSFUSION BLD/BLD COMPNT: CPT

## 2021-03-18 PROCEDURE — 82803 BLOOD GASES ANY COMBINATION: CPT

## 2021-03-18 PROCEDURE — 82330 ASSAY OF CALCIUM: CPT

## 2021-03-18 PROCEDURE — 83880 ASSAY OF NATRIURETIC PEPTIDE: CPT

## 2021-03-18 PROCEDURE — 86900 BLOOD TYPING SEROLOGIC ABO: CPT

## 2021-03-18 PROCEDURE — 82077 ASSAY SPEC XCP UR&BREATH IA: CPT

## 2021-03-18 PROCEDURE — 80053 COMPREHEN METABOLIC PANEL: CPT

## 2021-03-18 PROCEDURE — 6360000002 HC RX W HCPCS

## 2021-03-18 PROCEDURE — 2000000000 HC ICU R&B

## 2021-03-18 PROCEDURE — 99285 EMERGENCY DEPT VISIT HI MDM: CPT

## 2021-03-18 PROCEDURE — 83605 ASSAY OF LACTIC ACID: CPT

## 2021-03-18 PROCEDURE — G8428 CUR MEDS NOT DOCUMENT: HCPCS | Performed by: INTERNAL MEDICINE

## 2021-03-18 PROCEDURE — 1090F PRES/ABSN URINE INCON ASSESS: CPT | Performed by: INTERNAL MEDICINE

## 2021-03-18 PROCEDURE — 02HV33Z INSERTION OF INFUSION DEVICE INTO SUPERIOR VENA CAVA, PERCUTANEOUS APPROACH: ICD-10-PCS | Performed by: INTERNAL MEDICINE

## 2021-03-18 PROCEDURE — 4040F PNEUMOC VAC/ADMIN/RCVD: CPT | Performed by: INTERNAL MEDICINE

## 2021-03-18 PROCEDURE — 96376 TX/PRO/DX INJ SAME DRUG ADON: CPT

## 2021-03-18 PROCEDURE — 94761 N-INVAS EAR/PLS OXIMETRY MLT: CPT

## 2021-03-18 PROCEDURE — 86920 COMPATIBILITY TEST SPIN: CPT

## 2021-03-18 PROCEDURE — 94002 VENT MGMT INPAT INIT DAY: CPT

## 2021-03-18 PROCEDURE — 99214 OFFICE O/P EST MOD 30 MIN: CPT | Performed by: INTERNAL MEDICINE

## 2021-03-18 PROCEDURE — 1123F ACP DISCUSS/DSCN MKR DOCD: CPT | Performed by: INTERNAL MEDICINE

## 2021-03-18 PROCEDURE — 82435 ASSAY OF BLOOD CHLORIDE: CPT

## 2021-03-18 PROCEDURE — 36415 COLL VENOUS BLD VENIPUNCTURE: CPT

## 2021-03-18 PROCEDURE — 96365 THER/PROPH/DIAG IV INF INIT: CPT

## 2021-03-18 PROCEDURE — 85379 FIBRIN DEGRADATION QUANT: CPT

## 2021-03-18 PROCEDURE — 87635 SARS-COV-2 COVID-19 AMP PRB: CPT

## 2021-03-18 RX ORDER — ETOMIDATE 2 MG/ML
INJECTION INTRAVENOUS DAILY PRN
Status: COMPLETED | OUTPATIENT
Start: 2021-03-18 | End: 2021-03-18

## 2021-03-18 RX ORDER — DEXTROSE MONOHYDRATE 50 MG/ML
INJECTION, SOLUTION INTRAVENOUS
Status: DISCONTINUED
Start: 2021-03-18 | End: 2021-03-18

## 2021-03-18 RX ORDER — IPRATROPIUM BROMIDE AND ALBUTEROL SULFATE 2.5; .5 MG/3ML; MG/3ML
1 SOLUTION RESPIRATORY (INHALATION) 4 TIMES DAILY
Status: DISCONTINUED | OUTPATIENT
Start: 2021-03-18 | End: 2021-03-18

## 2021-03-18 RX ORDER — ACETAMINOPHEN 325 MG/1
650 TABLET ORAL EVERY 6 HOURS PRN
Status: DISCONTINUED | OUTPATIENT
Start: 2021-03-18 | End: 2021-04-02 | Stop reason: HOSPADM

## 2021-03-18 RX ORDER — VECURONIUM BROMIDE 1 MG/ML
INJECTION, POWDER, LYOPHILIZED, FOR SOLUTION INTRAVENOUS
Status: DISCONTINUED
Start: 2021-03-18 | End: 2021-03-18

## 2021-03-18 RX ORDER — 0.9 % SODIUM CHLORIDE 0.9 %
1000 INTRAVENOUS SOLUTION INTRAVENOUS ONCE
Status: COMPLETED | OUTPATIENT
Start: 2021-03-18 | End: 2021-03-18

## 2021-03-18 RX ORDER — SODIUM CHLORIDE 9 MG/ML
INJECTION, SOLUTION INTRAVENOUS PRN
Status: DISCONTINUED | OUTPATIENT
Start: 2021-03-18 | End: 2021-03-18

## 2021-03-18 RX ORDER — IPRATROPIUM BROMIDE AND ALBUTEROL SULFATE 2.5; .5 MG/3ML; MG/3ML
1 SOLUTION RESPIRATORY (INHALATION) 4 TIMES DAILY
Status: DISCONTINUED | OUTPATIENT
Start: 2021-03-19 | End: 2021-03-28

## 2021-03-18 RX ORDER — ONDANSETRON 2 MG/ML
4 INJECTION INTRAMUSCULAR; INTRAVENOUS EVERY 6 HOURS PRN
Status: DISCONTINUED | OUTPATIENT
Start: 2021-03-18 | End: 2021-04-02 | Stop reason: HOSPADM

## 2021-03-18 RX ORDER — VECURONIUM BROMIDE 1 MG/ML
INJECTION, POWDER, LYOPHILIZED, FOR SOLUTION INTRAVENOUS DAILY PRN
Status: COMPLETED | OUTPATIENT
Start: 2021-03-18 | End: 2021-03-18

## 2021-03-18 RX ORDER — EPINEPHRINE 0.1 MG/ML
SYRINGE (ML) INJECTION DAILY PRN
Status: COMPLETED | OUTPATIENT
Start: 2021-03-18 | End: 2021-03-18

## 2021-03-18 RX ORDER — PROMETHAZINE HYDROCHLORIDE 12.5 MG/1
12.5 TABLET ORAL EVERY 6 HOURS PRN
Status: DISCONTINUED | OUTPATIENT
Start: 2021-03-18 | End: 2021-04-02 | Stop reason: HOSPADM

## 2021-03-18 RX ORDER — MAGNESIUM SULFATE IN WATER 40 MG/ML
4000 INJECTION, SOLUTION INTRAVENOUS ONCE
Status: COMPLETED | OUTPATIENT
Start: 2021-03-18 | End: 2021-03-19

## 2021-03-18 RX ORDER — CALCIUM CHLORIDE 100 MG/ML
1000 INJECTION INTRAVENOUS; INTRAVENTRICULAR ONCE
Status: COMPLETED | OUTPATIENT
Start: 2021-03-18 | End: 2021-03-18

## 2021-03-18 RX ORDER — SODIUM CHLORIDE, SODIUM LACTATE, POTASSIUM CHLORIDE, CALCIUM CHLORIDE 600; 310; 30; 20 MG/100ML; MG/100ML; MG/100ML; MG/100ML
INJECTION, SOLUTION INTRAVENOUS CONTINUOUS
Status: DISCONTINUED | OUTPATIENT
Start: 2021-03-18 | End: 2021-03-21

## 2021-03-18 RX ORDER — DOPAMINE HYDROCHLORIDE 160 MG/100ML
2-20 INJECTION, SOLUTION INTRAVENOUS CONTINUOUS
Status: DISCONTINUED | OUTPATIENT
Start: 2021-03-18 | End: 2021-03-19

## 2021-03-18 RX ORDER — ETOMIDATE 2 MG/ML
INJECTION INTRAVENOUS
Status: DISCONTINUED
Start: 2021-03-18 | End: 2021-03-18

## 2021-03-18 RX ORDER — ATROPINE SULFATE 0.1 MG/ML
INJECTION INTRAVENOUS
Status: DISCONTINUED
Start: 2021-03-18 | End: 2021-03-18

## 2021-03-18 RX ORDER — ATROPINE SULFATE 1 MG/ML
INJECTION, SOLUTION INTRAMUSCULAR; INTRAVENOUS; SUBCUTANEOUS DAILY PRN
Status: COMPLETED | OUTPATIENT
Start: 2021-03-18 | End: 2021-03-18

## 2021-03-18 RX ORDER — CHLORHEXIDINE GLUCONATE 0.12 MG/ML
15 RINSE ORAL 2 TIMES DAILY
Status: DISCONTINUED | OUTPATIENT
Start: 2021-03-18 | End: 2021-03-31

## 2021-03-18 RX ORDER — EPINEPHRINE 0.1 MG/ML
SYRINGE (ML) INJECTION
Status: DISCONTINUED
Start: 2021-03-18 | End: 2021-03-18

## 2021-03-18 RX ORDER — SODIUM CHLORIDE 0.9 % (FLUSH) 0.9 %
10 SYRINGE (ML) INJECTION PRN
Status: DISCONTINUED | OUTPATIENT
Start: 2021-03-18 | End: 2021-04-02 | Stop reason: HOSPADM

## 2021-03-18 RX ORDER — DOPAMINE HYDROCHLORIDE 160 MG/100ML
INJECTION, SOLUTION INTRAVENOUS
Status: COMPLETED
Start: 2021-03-18 | End: 2021-03-18

## 2021-03-18 RX ORDER — SODIUM CHLORIDE 0.9 % (FLUSH) 0.9 %
10 SYRINGE (ML) INJECTION EVERY 12 HOURS SCHEDULED
Status: DISCONTINUED | OUTPATIENT
Start: 2021-03-18 | End: 2021-04-02 | Stop reason: HOSPADM

## 2021-03-18 RX ORDER — ACETAMINOPHEN 650 MG/1
650 SUPPOSITORY RECTAL EVERY 6 HOURS PRN
Status: DISCONTINUED | OUTPATIENT
Start: 2021-03-18 | End: 2021-04-02 | Stop reason: HOSPADM

## 2021-03-18 RX ORDER — POLYETHYLENE GLYCOL 3350 17 G/17G
17 POWDER, FOR SOLUTION ORAL DAILY PRN
Status: DISCONTINUED | OUTPATIENT
Start: 2021-03-18 | End: 2021-03-22

## 2021-03-18 RX ADMIN — SODIUM CHLORIDE 1000 ML: 9 INJECTION, SOLUTION INTRAVENOUS at 18:03

## 2021-03-18 RX ADMIN — DOPAMINE HYDROCHLORIDE 10 MCG/KG/MIN: 160 INJECTION, SOLUTION INTRAVENOUS at 18:26

## 2021-03-18 RX ADMIN — EPINEPHRINE 1 MG: 0.1 INJECTION, SOLUTION ENDOTRACHEAL; INTRACARDIAC; INTRAVENOUS at 17:32

## 2021-03-18 RX ADMIN — NOREPINEPHRINE BITARTRATE 20 MCG/MIN: 1 INJECTION INTRAVENOUS at 23:15

## 2021-03-18 RX ADMIN — SODIUM BICARBONATE 50 MEQ: 84 INJECTION, SOLUTION INTRAVENOUS at 17:28

## 2021-03-18 RX ADMIN — EPINEPHRINE 1 MG: 0.1 INJECTION, SOLUTION ENDOTRACHEAL; INTRACARDIAC; INTRAVENOUS at 17:28

## 2021-03-18 RX ADMIN — ATROPINE SULFATE 0.5 MG: 1 INJECTION, SOLUTION INTRAMUSCULAR; INTRAVENOUS; SUBCUTANEOUS at 16:05

## 2021-03-18 RX ADMIN — SODIUM BICARBONATE 50 MEQ: 84 INJECTION, SOLUTION INTRAVENOUS at 18:28

## 2021-03-18 RX ADMIN — SODIUM CHLORIDE 1000 ML: 9 INJECTION, SOLUTION INTRAVENOUS at 17:08

## 2021-03-18 RX ADMIN — EPINEPHRINE 1 MG: 0.1 INJECTION, SOLUTION ENDOTRACHEAL; INTRACARDIAC; INTRAVENOUS at 17:38

## 2021-03-18 RX ADMIN — NOREPINEPHRINE BITARTRATE 28 MCG/MIN: 1 INJECTION INTRAVENOUS at 22:24

## 2021-03-18 RX ADMIN — VECURONIUM BROMIDE 10 MG: 1 INJECTION, POWDER, LYOPHILIZED, FOR SOLUTION INTRAVENOUS at 16:09

## 2021-03-18 RX ADMIN — CALCIUM CHLORIDE 1000 MG: 100 INJECTION, SOLUTION INTRAVENOUS at 18:25

## 2021-03-18 RX ADMIN — VECURONIUM BROMIDE 10 MG: 1 INJECTION, POWDER, LYOPHILIZED, FOR SOLUTION INTRAVENOUS at 16:03

## 2021-03-18 RX ADMIN — DOPAMINE HYDROCHLORIDE 400 MG: 160 INJECTION, SOLUTION INTRAVENOUS at 17:57

## 2021-03-18 RX ADMIN — EPINEPHRINE 1 MG: 0.1 INJECTION, SOLUTION ENDOTRACHEAL; INTRACARDIAC; INTRAVENOUS at 16:02

## 2021-03-18 RX ADMIN — EPINEPHRINE 2 MCG/MIN: 1 INJECTION INTRAMUSCULAR; INTRAVENOUS; SUBCUTANEOUS at 18:32

## 2021-03-18 RX ADMIN — ETOMIDATE 16 MG: 2 INJECTION, SOLUTION INTRAVENOUS at 16:04

## 2021-03-18 RX ADMIN — ATROPINE SULFATE 1 MG: 1 INJECTION, SOLUTION INTRAMUSCULAR; INTRAVENOUS; SUBCUTANEOUS at 16:11

## 2021-03-18 RX ADMIN — NOREPINEPHRINE BITARTRATE 2 MCG/MIN: 1 INJECTION INTRAVENOUS at 17:06

## 2021-03-18 RX ADMIN — EPINEPHRINE 1 MG: 0.1 INJECTION, SOLUTION ENDOTRACHEAL; INTRACARDIAC; INTRAVENOUS at 17:45

## 2021-03-18 ASSESSMENT — ENCOUNTER SYMPTOMS
NAUSEA: 0
NAUSEA: 0
CHEST TIGHTNESS: 0
VOMITING: 0
RESPIRATORY NEGATIVE: 1
SHORTNESS OF BREATH: 0
EYE PAIN: 0
EYES NEGATIVE: 1
STRIDOR: 0
COUGH: 0
SORE THROAT: 0
CHEST TIGHTNESS: 0
SHORTNESS OF BREATH: 1
WHEEZING: 0
GASTROINTESTINAL NEGATIVE: 1
BLOOD IN STOOL: 0
ABDOMINAL PAIN: 0

## 2021-03-18 ASSESSMENT — PAIN SCALES - GENERAL
PAINLEVEL_OUTOF10: 0

## 2021-03-18 ASSESSMENT — PULMONARY FUNCTION TESTS
PIF_VALUE: 23
PIF_VALUE: 22
PIF_VALUE: 23

## 2021-03-18 NOTE — TELEPHONE ENCOUNTER
Patient's  calling and they are taking patient to the ER at [facility]. She was  SOB and passed out.

## 2021-03-18 NOTE — ED PROVIDER NOTES
[address] [facility] ED  EMERGENCY DEPARTMENT ENCOUNTER      Pt Name: Chyna Coronado  MRN: [id number]  [place] 1945  Date of evaluation: 3/18/2021  Provider: Jassi Sanon DO    CHIEF COMPLAINT       Chief Complaint   Patient presents with    Respiratory Distress         HISTORY OF PRESENT ILLNESS   (Location/Symptom, Timing/Onset, Context/Setting, Quality, Duration, Modifying Factors, Severity)  Note limiting factors. Chyna Coronado is a 67 y.o. female who presents to the emergency department . Patient brought in after getting very short of breath and passing out at home. Patient has been putting on weight despite eating only 1 meal a day. She thought because she was gaining weight that may be she was retaining water and took additional torsemide 100 mg today. She has not been urinating very much either. Has been very tired. Short of breath. Had a virtual visit with Dr. Bakari Rowe earlier today. Patient with history of diastolic congestive heart failure, chronic kidney disease, anemia, history of hypertrophic obstructive cardiomyopathy, gastric AVM. Status post cardiac catheterization in October 2019 that showed normal coronary arteries    HPI    Nursing Notes were reviewed. REVIEW OF SYSTEMS    (2-9 systems for level 4, 10 or more for level 5)     Review of Systems   Constitutional: Positive for activity change, fatigue and unexpected weight change. Negative for appetite change. HENT: Negative for congestion and sore throat. Eyes: Negative for pain and visual disturbance. Respiratory: Positive for shortness of breath. Negative for chest tightness. Cardiovascular: Negative for chest pain. Gastrointestinal: Negative for abdominal pain, nausea and vomiting. Endocrine: Negative for polydipsia. Genitourinary: Negative for flank pain and urgency. Decreased urination   Musculoskeletal: Negative for gait problem and neck stiffness. Skin: Negative for rash. Neurological: Positive for syncope. Negative for weakness, light-headedness and headaches. Psychiatric/Behavioral: Negative for confusion and sleep disturbance. Except as noted above the remainder of the review of systems was reviewed and negative. PAST MEDICAL HISTORY     Past Medical History:   Diagnosis Date    Anxiety     Asthma     dx 2019 / has smoked since age 14    CHF (congestive heart failure) (HCC)     Chronic back pain     Bilateral L5 S1 Radic on emg--surprisingly worse on the left than the right--pt's symptoms and her MRI show worse on the right    Chronic obstructive pulmonary disease with acute exacerbation ([facility] [address] 10/12/2019    Depression     Fibromyalgia     Hyperlipidemia     meds > 8 yrs    Hypertension     meds > 45 yrs    On home O2     2l per n/c at bedtime mostly,     Osteoarthritis     Type II diabetes mellitus, uncontrolled ([address]     hx > 8 yrs    Unspecified sleep apnea          SURGICAL HISTORY       Past Surgical History:   Procedure Laterality Date    BACK SURGERY  2017    lumbar disc    CARDIAC CATHETERIZATION  11/3/14     DR. MIRELES / no stents    COLONOSCOPY  08/29/2016    w/polypectomy     COLONOSCOPY N/A 9/29/2020    COLONOSCOPY WITH POLYPECTOMY performed by Jian Harris MD at [facility] N/A 10/7/2019    EUA HYSTEROSCOPY DILATATION AND CURETTAGE performed by Nilo Ayers DO at [facility]. [address], COLON, DIAGNOSTIC      EYE SURGERY      [place] with IOL OU / [address] [place]  [address]    umbilical hernia repair    NE ESOPHAGOGASTRODUODENOSCOPY TRANSORAL DIAGNOSTIC N/A 3/24/2017    EGD ESOPHAGOGASTRODUODENOSCOPY performed by Bill Iniguez MD at [place] [address] 2/8/2018    negative findings    NE REVISE MEDIAN N/CARPAL TUNNEL SURG Left 6/5/2017    LEFT  CARPAL TUNNEL RELEASE performed by Garry Alva MD at [facility] VARGHESE,5+Y/O,REDUC N/A 2/8/2018    TONSILLECTOMY      as child    UPPER GASTROINTESTINAL ENDOSCOPY  08/26/2016    w/bx     UPPER GASTROINTESTINAL ENDOSCOPY N/A 2/25/2020    EGD possible biopsy performed by Melburn Buerger, MD at [address] [facility] [place] 7/1/2020    EGD PUSH ENTEROSCOPY performed by Orville Oakes MD at [facility]       Previous Medications    ACCU-CHEK PHYLLIS PLUS STRIP    Test 3x daily Dx E11.65    ACCU-CHEK SOFTCLIX LANCETS MISC    bid    BLOOD GLUCOSE MONITORING SUPPL (ACCU-CHEK PHYLLIS CONNECT) W/DEVICE KIT    1 kit by Does not apply route daily    BLOOD GLUCOSE TEST STRIPS (ACCU-CHEK PHYLLIS PLUS) STRIP    Patient test 3x daily E65.11    CARVEDILOL (COREG) 25 MG TABLET    Take 1 tablet by mouth 2 times daily    CLONIDINE (CATAPRES) 0.2 MG TABLET    Take 1 tablet by mouth 2 times daily    CONTINUOUS BLOOD GLUC  (FREESTYLE MURALI 14 DAY READER) [place]    As directed    CONTINUOUS BLOOD GLUC SENSOR (FREESTYLE MURALI 14 DAY SENSOR) [facility]    Use every 2 weeks    DICYCLOMINE (BENTYL) 10 MG CAPSULE    Take 1 capsule by mouth 4 times daily as needed (abdominal pain)    INSULIN GLARGINE (LANTUS) 100 UNIT/ML INJECTION VIAL    60 units at bedtime    INSULIN LISPRO (HUMALOG) 100 UNIT/ML INJECTION VIAL    18 units at each mels    IPRATROPIUM-ALBUTEROL (DUONEB) 0.5-2.5 (3) MG/3ML SOLN NEBULIZER SOLUTION    Inhale 3 mLs into the lungs 3 times daily    ISOSORBIDE MONONITRATE (IMDUR) 60 MG EXTENDED RELEASE TABLET    Take 1 tablet by mouth daily    ONDANSETRON (ZOFRAN) 4 MG TABLET    Take 1 tablet by mouth every 8 hours as needed for Nausea or Vomiting    PANTOPRAZOLE (PROTONIX) 40 MG TABLET    Take 40mg twice daily (1st dose 30min before breakfast) for 30 days.  After 30 days, you make take 40mg once daily before breakfast.    PAROXETINE (PAXIL) 40 MG TABLET    TAKE 1 TABLET BY MOUTH ONCE DAILY IN THE MORNING    ROSUVASTATIN (CRESTOR) 40 MG TABLET    TAKE 1 TABLET BY MOUTH ONCE DAILY IN THE EVENING    SPIRONOLACTONE (ALDACTONE) 25 MG TABLET    Take 1 tablet by mouth daily    TORSEMIDE (DEMADEX) 100 MG TABLET    Take 0.5 tablets by mouth daily    TRAMADOL (ULTRAM) 50 MG TABLET    Take 50 mg by mouth every 6 hours as needed for Pain. VERAPAMIL (CALAN SR) 120 MG EXTENDED RELEASE TABLET    Take 1 tablet by mouth daily       ALLERGIES     Ibuprofen, Metformin and related, and Darvon [propoxyphene hcl]    FAMILY HISTORY       Family History   Problem Relation Age of Onset    Heart Disease Father         cardiac bypass    Arthritis Father     Arthritis Mother     Other Mother          at age 80    Other Sister         [facility]    No Known Problems Daughter     Stroke Son           SOCIAL HISTORY       Social History     Socioeconomic History    Marital status:      Spouse name: Michael Lynn Number of children: 2    Years of education: 15    Highest education level: High school graduate   Occupational History    Occupation: Retired-   Social Needs    Financial resource strain: Not hard at all   [employer] insecurity     Worry: Never true     Inability: Never true    Transportation needs     Medical: No     Non-medical: No   Tobacco Use    Smoking status: Former Smoker     Packs/day: 1.00     Years: 59.00     Pack years: 59.00     Types: Cigarettes     Start date: 2017    Smokeless tobacco: Never Used    Tobacco comment: quit 2-3 weeks ago    Substance and Sexual Activity    Alcohol use: Not Currently    Drug use: No    Sexual activity: Yes     Partners: Male   Lifestyle    Physical activity     Days per week: 0 days     Minutes per session: 0 min    Stress:  Only a little   Relationships    Social connections     Talks on phone: More than three times a week     Gets together: More than three times a week     Attends [employer] service: 1 to 4 times per year     Active member of club or organization: No Attends meetings of clubs or organizations: Never     Relationship status: Living with partner    Intimate partner violence     Fear of current or ex partner: No     Emotionally abused: No     Physically abused: No     Forced sexual activity: No   Other Topics Concern    None   Social History Narrative    Grew up in [place]     Lives With:  [id number] Spouse    Type of Home: House    Home Layout: Two level, Performs ADL's on one level    Home Access: Stairs to enter with rails    Entrance Stairs - Number of Steps: 4    Bathroom Shower/Tub: Tub/Shower unit, Doors    Bathroom Equipment: Shower chair, Grab bars in [facility]: Rolling walker, Cane, Oxygen(uses her O2 very seldom)    ADL Assistance: Needs assistance    Homemaking Assistance: Needs assistance    Homemaking Responsibilities: No(spouse performs)    Ambulation Assistance: Independent    Transfer Assistance: Independent    Active : No    Occupation: Retired    Type of occupation: worked in [employer]: patient enjoys [employer]       [address]    (up to 7 for level 4, 8 or more for level 5)     ED Triage Vitals   BP Temp Temp src Pulse Resp SpO2 Height Weight   03/18/21 1604 03/18/21 1712 -- 03/18/21 1604 03/18/21 1604 03/18/21 1604 -- 03/18/21 1715   (!) 128/97 97.1 °F (36.2 °C)  (!) 31 14 100 %  143 lb (64.9 kg)       Physical Exam  Vitals signs and nursing note reviewed. HENT:      Head: Normocephalic and atraumatic. Nose: Nose normal.      Mouth/Throat:      Mouth: Mucous membranes are moist.   Cardiovascular:      Rate and Rhythm: Bradycardia present. Pulmonary:      Comments: Agonal  Abdominal:      General: Bowel sounds are normal.      Palpations: Abdomen is soft. Genitourinary:     Rectum: Guaiac result positive. Musculoskeletal:      Right lower leg: No edema. Left lower leg: No edema. Skin:     General: Skin is warm.    Neurological:      Comments: Unresponsive         DIAGNOSTIC RESULTS     EKG: All EKG's are interpreted by the Emergency Department Physician who either signs or Co-signs this chart in the absence of a cardiologist.    Junctional rhythm with PACs 45 bpm    RADIOLOGY:   Non-plain film images such as CT, Ultrasound and MRI are read by the radiologist. Plain radiographic images are visualized and preliminarily interpreted by the emergency physician with the below findings:    Chest x-ray shows increased interstitial markings. ET tube in good position.   Orogastric tube in good position    Interpretation per the Radiologist below, if available at the time of this note:    XR CHEST PORTABLE    (Results Pending)         ED BEDSIDE ULTRASOUND:   Performed by ED Physician - none    LABS:  Labs Reviewed   CBC WITH AUTO DIFFERENTIAL - Abnormal; Notable for the following components:       Result Value    RBC 1.87 (*)     Hemoglobin 5.8 (*)     Hematocrit 18.9 (*)     .9 (*)     MCHC 30.7 (*)     RDW 17.7 (*)     All other components within normal limits    Narrative:     CALL  [facility] tel. [phone],  H/H results called to Dr Serina Rae ED, 03/18/2021 17:09, by Alyson Blow - Abnormal; Notable for the following components:    Protime 18.3 (*)     All other components within normal limits   POCT ARTERIAL - Abnormal; Notable for the following components:    POC Potassium 6.6 (*)     POC Glucose 150 (*)     POC Creatinine 2.8 (*)     GFR Non- 16 (*)     GFR African American 20 (*)     pH, Arterial 7.283 (*)     pCO2, Arterial 27 (*)     pO2, Arterial 499 (*)     HCO3, Arterial 12.5 (*)     Base Excess, Arterial -14 (*)     O2 Sat, Arterial 100 (*)     TCO2, Arterial 13 (*)     Lactate 7.04 (*)     POC Hematocrit 23 (*)     Hemoglobin 7.8 (*)     All other components within normal limits   CULTURE, BLOOD 1   CULTURE, BLOOD 2   LACTIC ACID, PLASMA   TROPONIN   COMPREHENSIVE METABOLIC PANEL   ETHANOL   URINE DRUG SCREEN MAGNESIUM   D-DIMER, QUANTITATIVE   BRAIN NATRIURETIC PEPTIDE   POCT EPOC BLOOD GAS, LACTIC ACID, ICA   TYPE AND SCREEN   TYPE AND SCREEN   PREPARE RBC (CROSSMATCH)       All other labs were within normal range or not returned as of this dictation. EMERGENCY DEPARTMENT COURSE and DIFFERENTIAL DIAGNOSIS/MDM:   Vitals:    Vitals:    03/18/21 1705 03/18/21 1710 03/18/21 1712 03/18/21 1715   BP: (!) 74/47 (!) 83/53     Pulse: (!) 41 (!) 46     Resp: 16 16     Temp:   97.1 °F (36.2 °C)    SpO2: 91% 94%     Weight:    143 lb (64.9 kg)       Patient came in unresponsive. Agonal respirations. Bradycardic and hypotensive. Patient intubated in the emergency department. After intubation heart rate came up nicely blood pressure came up with some fluids. Heart rate was going down into the 40s and central line was placed quickly in the right femoral vein. Patient was started on dopamine and Levophed and given IV fluids. Patient was moved out of the trauma bay. Blood pressure deteriorated heart rate dropped and QRS widened and patient arrested. Coded the patient for approximately 16 minutes. Used the [employer] device the entire time. Please see code sheet for all meds. Pulse recovered. Blood pressure continued to be low and heart rate was hovering in the 30s and 40s. That is despite Levophed and dopamine. Epinephrine drip to be started and then we will discontinue the dopamine at least.  1 unit of uncrossed match packed RBCs was given to the patient for hemoglobin of 5. I did explain to the family that her prognosis is extremely poor. I told him that I did not expect her to survive. MDM      REASSESSMENT          CRITICAL CARE TIME   Total Critical Care time was 60 minutes, excluding separately reportable procedures. There was a high probability of clinically significant/life threatening deterioration in the patient's condition which required my urgent intervention.       CONSULTS:  None    PROCEDURES:  Unless otherwise noted below, none     Intubation    Date/Time: 3/18/2021 6:25 PM  Performed by: Naty Calero DO  Authorized by: Naty Calero DO     Consent:     Consent obtained:  Emergent situation  Pre-procedure details:     Patient status:  Unresponsive    Mallampati score:  II    Pretreatment meds: Etomidate. Paralytics:  Vecuronium  Procedure details:     Preoxygenation:  Bag valve mask    CPR in progress: no      Intubation method:  Oral    Laryngoscope blade:  Valerio 1    Tube size (mm):  7.0    Tube type:  Cuffed    Number of attempts:  2    Ventilation between attempts: yes      Cricoid pressure: no      Tube visualized through cords: yes    Placement assessment:     ETT to lip:  23    Tube secured with:  ETT whaley    Breath sounds:  Equal    Placement verification: CXR verification and ETCO2 detector      CXR findings:  ETT in proper place  Post-procedure details:     Patient tolerance of procedure: Tolerated well, no immediate complications  Central Line    Date/Time: 3/18/2021 6:26 PM  Performed by: Naty Calero DO  Authorized by: Naty Calero DO     Consent:     Consent obtained:  Emergent situation  Pre-procedure details:     Hand hygiene: Hand hygiene performed prior to insertion      Sterile barrier technique: All elements of maximal sterile technique followed      Skin preparation:  2% chlorhexidine    Skin preparation agent: Skin preparation agent completely dried prior to procedure    Anesthesia (see MAR for exact dosages):      Anesthesia method:  None  Procedure details:     Location:  R femoral    Patient position:  Flat    Procedural supplies:  Triple lumen    Catheter size:  7 Fr    Landmarks identified: yes      Ultrasound guidance: yes      Sterile ultrasound techniques: Sterile gel and sterile probe covers were used      Number of attempts:  1    Successful placement: yes    Post-procedure details:     Post-procedure:  Dressing applied and line sutured    Assessment: Blood return through all ports    Patient tolerance of procedure: Tolerated well, no immediate complications            FINAL IMPRESSION      1. Cardiopulmonary arrest ([address]    2. Anemia, unspecified type    3. Bradycardia          DISPOSITION/PLAN   DISPOSITION  Admit ICU    PATIENT REFERRED TO:  No follow-up provider specified. DISCHARGE MEDICATIONS:  New Prescriptions    No medications on file     Controlled Substances Monitoring:     RX Monitoring 10/20/2020   Attestation -   Acute Pain Prescriptions Prescription exceeds daily limit for a specific reason. See comments or note. ;Not required given exclusionary diagnoses. ..;Severe pain not adequately treated with lower dose. Periodic Controlled Substance Monitoring Possible medication side effects, risk of tolerance/dependence & alternative treatments discussed. ;No signs of potential drug abuse or diversion identified. ;Assessed functional status. ;Obtaining appropriate analgesic effect of treatment.    Chronic Pain > 50 MEDD -   Chronic Pain > 80 MEDD -       (Please note that portions of this note were completed with a voice recognition program.  Efforts were made to edit the dictations but occasionally words are mis-transcribed.)    Naty Calero DO (electronically signed)  Attending Emergency Physician           Naty Calero DO  03/18/21 2603

## 2021-03-18 NOTE — ED NOTES
Bed: 08  Expected date:   Expected time:   Means of arrival:   Comments:  Trauma room     Tiffani Green RN  03/18/21 1063

## 2021-03-18 NOTE — FLOWSHEET NOTE
Spiritual Care Services     Summary of Visit:  Sean Hutchison responded to a call to go to the Er patient coded while in the room, ministry of presence,  offered prayer and support to the family     Spiritual Assessment/Intervention/Outcomes:    Encounter Summary  Services provided to[de-identified] (P) Family, Patient  Referral/Consult From[de-identified] (P) Physician, Nurse  Support System: (P) Spouse, Family members, Children  Continue Visiting: (P) Yes  Complexity of Encounter: (P) High  Length of Encounter: (P) 1 hour, 30 minutes  Spiritual Assessment Completed: (P) Yes              Grief and Life Adjustment  Type: (P) Adjustment to illness  Assessment: (P) Tearful, Approachable, Shock, Despair  Intervention: (P) Active listening, Sustaining presence/ Ministry of presence  Outcome: (P) Receptive, Comfort, Tearful, Less anxious, less agitated                   Care Plan:        [address]   Electronically signed by Marc Koenig on 3/18/21 at 6:59 PM EDT     To reach a  for emotional and spiritual support, place an [facility] consult request.   If a  is needed immediately, dial 0 and ask to page the on-call .

## 2021-03-18 NOTE — PROGRESS NOTES
Subsequent Progress Note  Patient: Audrey Madrid  YOB: 1945  MRN: [id number]    Chief Complaint: htn HF SOB DM HOCM  Chief Complaint   Patient presents with    Hypertension       CV Data:  3/2019 Echo EF 79% no KYLE   10/8/2019 Cath CX 20-30 LVEF 95%   10/2020 Echo EF 55 Moderate CLVH  10/5/2020 Cath LAD 50-60     Subjective/HPI: she stopped Verapamil 3 days ago due to severe fatigue and weakness. Feels better now. No cp no sob no bleed. C/o nocturnal leg cramps    3/27/2020 Patient and/or health care decision maker is aware that that he may receive a bill for this telephone service, depending on his insurance coverage, and has provided verbal consent to proceed. This visit was completed via telephone. Time spent on the phone with patient 18 minutes. She was to f/u with Dr. Trina Galvan but was on my VV schedule today. No CP no SOB. Still has occasional night time leg cramps. She is walking and compliant with meds. No LE edema. 1/15/21 recent multiple hospitalization. She was intubated and in shock. Had TVP for Bradycardia but stabilized and did not require PPM.      3/11/21 TELEHEALTH EVALUATION -- Audio/Visual (During STZXE-74 public health emergency)    No cp no sob no falls no bleed. Not walking much. Poor appetite.  /104. .. ran out of Verapamil and Clonidine. 3/18/21 Patient and/or health care decision maker is aware that that he/she may receive a bill for this telephone service, depending on his insurance coverage, and has provided verbal consent to proceed. This visit was completed via telephone. Total time 14 minutes. Had been doing well. This AM she took extra full dose Torsemde 50 mg because she thought she was not uriniating enough.  called in due to her BP being 79/56. She is awake and alert sitting. No cp no sob.  Just feels weak and tired     EKG:    Past Medical History:   Diagnosis Date    Anxiety     Asthma     dx 2019 / has smoked since age 14    CHF (congestive heart failure) (formerly [facility])     Chronic back pain     Bilateral L5 S1 Radic on emg--surprisingly worse on the left than the right--pt's symptoms and her MRI show worse on the right    Chronic obstructive pulmonary disease with acute exacerbation (HCC) 10/12/2019    Depression     Fibromyalgia     Hyperlipidemia     meds > 8 yrs    Hypertension     meds > 45 yrs    On home O2     2l per n/c at bedtime mostly,     Osteoarthritis     Type II diabetes mellitus, uncontrolled (HCC)     hx > 8 yrs    Unspecified sleep apnea        Past Surgical History:   Procedure Laterality Date    BACK SURGERY  2017    lumbar disc    CARDIAC CATHETERIZATION  11/3/14     DR. MIRELES / no stents    COLONOSCOPY  08/29/2016    w/polypectomy     COLONOSCOPY N/A 9/29/2020    COLONOSCOPY WITH POLYPECTOMY performed by Johnny Friedman MD at [facility] N/A 10/7/2019    EUA HYSTEROSCOPY DILATATION AND CURETTAGE performed by Brenda Camacho DO at [facility]. [address], COLON, DIAGNOSTIC      EYE SURGERY      [place] with IOL OU / [address] [place]  [address]    umbilical hernia repair    AR ESOPHAGOGASTRODUODENOSCOPY TRANSORAL DIAGNOSTIC N/A 3/24/2017    EGD ESOPHAGOGASTRODUODENOSCOPY performed by Magali Ravi MD at [place] [address] 2/8/2018    negative findings    AR REVISE MEDIAN N/CARPAL TUNNEL SURG Left 6/5/2017    LEFT  CARPAL TUNNEL RELEASE performed by Flora Muñiz MD at [facility] N/A 2/8/2018    TONSILLECTOMY      as child    UPPER GASTROINTESTINAL ENDOSCOPY  08/26/2016    w/bx     UPPER GASTROINTESTINAL ENDOSCOPY N/A 2/25/2020    EGD possible biopsy performed by Johnny Friedman MD at [facility] 7/1/2020    EGD PUSH ENTEROSCOPY performed by Vito Blue MD at [facility]       Family [facility] Problem Relation Age of Onset    Heart Disease Father         cardiac bypass    Arthritis Father     Arthritis Mother     Other Mother          at age 80    Other Sister         [facility]    No Known Problems Daughter     Stroke Son        Social History     Socioeconomic History    Marital status:      Spouse name: Pérez Padron Number of children: 2    Years of education: 15    Highest education level: High school graduate   Occupational History    Occupation: Retired-   Social Needs    Financial resource strain: Not hard at all   [place] insecurity     Worry: Never true     Inability: Never true    Transportation needs     Medical: No     Non-medical: No   Tobacco Use    Smoking status: Former Smoker     Packs/day: 1.00     Years: 59.00     Pack years: 59.00     Types: Cigarettes     Start date: 2017    Smokeless tobacco: Never Used    Tobacco comment: quit 2-3 weeks ago    Substance and Sexual Activity    Alcohol use: Not Currently    Drug use: No    Sexual activity: Yes     Partners: Male   Lifestyle    Physical activity     Days per week: 0 days     Minutes per session: 0 min    Stress:  Only a little   Relationships    Social connections     Talks on phone: More than three times a week     Gets together: More than three times a week     Attends Yarsani service: 1 to 4 times per year     Active member of club or organization: No     Attends meetings of clubs or organizations: Never     Relationship status: Living with partner    Intimate partner violence     Fear of current or ex partner: No     Emotionally abused: No     Physically abused: No     Forced sexual activity: No   Other Topics Concern    Not on file   Social History Narrative    Grew up in [place]     Lives With:  [id number] Spouse    Type of Home: [address] [place] [address]: Two level, Performs ADL's on one level    Home Access: Stairs to enter with rails    Entrance Stairs - Number of Steps: 4 Bathroom Shower/Tub: Tub/Shower unit, Doors    Bathroom Equipment: Shower chair, Grab bars in shower    Home Equipment: Rolling walker, Cane, Oxygen(uses her O2 very seldom)    ADL Assistance: Needs assistance    Homemaking Assistance: Needs assistance    Homemaking Responsibilities: No(spouse performs)    Ambulation Assistance: Independent    Transfer Assistance: Independent    Active : No    Occupation: Retired    Type of occupation: worked in [employer]: patient enjoys televsision       Allergies   Allergen Reactions    Ibuprofen Nausea Only    Metformin And Related      Diarrhea      Darvon [Propoxyphene Hcl] Nausea And Vomiting       Current Outpatient Medications   Medication Sig Dispense Refill    insulin lispro (HUMALOG) 100 UNIT/ML injection vial 18 units at each mels 1 vial 3    insulin glargine (LANTUS) 100 UNIT/ML injection vial 60 units at bedtime 3 vial 3    verapamil (CALAN SR) 120 MG extended release tablet Take 1 tablet by mouth daily 90 tablet 3    cloNIDine (CATAPRES) 0.2 MG tablet Take 1 tablet by mouth 2 times daily 180 tablet 3    rosuvastatin (CRESTOR) 40 MG tablet TAKE 1 TABLET BY MOUTH ONCE DAILY IN THE EVENING 90 tablet 0    torsemide (DEMADEX) 100 MG tablet Take 0.5 tablets by mouth daily 30 tablet 0    PARoxetine (PAXIL) 40 MG tablet TAKE 1 TABLET BY MOUTH ONCE DAILY IN THE MORNING 90 tablet 1    ACCU-CHEK PHYLLIS PLUS strip Test 3x daily Dx E11.65 100 each 3    Accu-Chek Softclix Lancets MISC bid 100 each 3    Blood Glucose Monitoring Suppl (ACCU-CHEK PHYLLIS CONNECT) w/Device KIT 1 kit by Does not apply route daily 1 kit 0    blood glucose test strips (ACCU-CHEK PHYLLIS PLUS) strip Patient test 3x daily E65.11 100 each 3    ipratropium-albuterol (DUONEB) 0.5-2.5 (3) MG/3ML SOLN nebulizer solution Inhale 3 mLs into the lungs 3 times daily 360 mL 0    Continuous Blood Gluc Sensor (FREESTYLE MURALI 14 DAY SENSOR) [facility] Use every 2 weeks 2 each 1    Lipids:  Lab Results   Component Value Date    CHOL 143 11/30/2020    CHOL 105 02/24/2020    CHOL 158 10/13/2019     Lab Results   Component Value Date    TRIG 64 11/30/2020    TRIG 67 02/24/2020    TRIG 118 10/13/2019     Lab Results   Component Value Date    HDL 61 (H) 11/30/2020    HDL 42 02/24/2020    HDL 61 (H) 10/13/2019     Lab Results   Component Value Date    LDLCALC 69 11/30/2020    LDLCALC 50 02/24/2020    LDLCALC 73 10/13/2019     No results found for: LABVLDL, VLDL  Lab Results   Component Value Date    CHOLHDLRATIO 3.4 09/11/2012     CMP:    Lab Results   Component Value Date     11/30/2020    K 5.4 11/30/2020    K 4.4 10/19/2020     11/30/2020    CO2 24 11/30/2020    BUN 56 11/30/2020    CREATININE 3.06 11/30/2020    GFRAA 17.9 11/30/2020    LABGLOM 14.8 11/30/2020    GLUCOSE 241 11/30/2020    PROT 7.0 11/30/2020    LABALBU 4.1 11/30/2020    CALCIUM 9.7 11/30/2020    BILITOT <0.2 11/30/2020    ALKPHOS 150 11/30/2020    AST 22 11/30/2020    ALT 21 11/30/2020     BMP:    Lab Results   Component Value Date     11/30/2020    K 5.4 11/30/2020    K 4.4 10/19/2020     11/30/2020    CO2 24 11/30/2020    BUN 56 11/30/2020    LABALBU 4.1 11/30/2020    CREATININE 3.06 11/30/2020    CALCIUM 9.7 11/30/2020    GFRAA 17.9 11/30/2020    LABGLOM 14.8 11/30/2020    GLUCOSE 241 11/30/2020     Magnesium:    Lab Results   Component Value Date    MG 1.8 10/15/2020     TSH:  Lab Results   Component Value Date    TSH 0.474 11/30/2020       Patient Active Problem List   Diagnosis    Hypertension    Hyperlipidemia    Uncontrolled type 2 diabetes mellitus with complication, without long-term current use of insulin (HCC)    Fibromyalgia    Anxiety    Smoker    Insomnia    DJD (degenerative joint disease), lumbar    Lumbosacral radiculopathy at S1    DDD (degenerative disc disease), lumbar    Dysphonia    CTS (carpal tunnel syndrome)    High risk medication use-Norco - 12/20/17 OARRS PM&R, 02/20/18 OARRS PM&R, 03/07/18 OARRS PM&R, Urine Drug screen negative 02/06/17 PM&R--MED CONTRACT 2/6/17    SOB (shortness of breath) on exertion    Chest pain    Memory deficit    Artificial lens present    Presbyopia    Astigmatism, regular    Cataract, nuclear sclerotic senile    IDDM (insulin dependent diabetes mellitus)    Regular astigmatism    Nuclear senile cataract    Neck pain    Lumbosacral radiculopathy at L5    Spinal stenosis of lumbar region with neurogenic claudication    Impaired mobility and activities of daily living    Non-compliant patient NO showed FU 1/10/17 Dr Gonzalo Sigala Insomnia secondary to chronic pain    Reactive depression    Diabetic radiculopathy (HCC)    Cervical radicular pain    Diabetic asymmetric polyneuropathy (HCC)    Myalgia    Intercostal neuropathy    Osteoarthritis of spine with radiculopathy, lumbar region    Acute combined systolic and diastolic CHF, NYHA class 1 (HCC)    PMB (postmenopausal bleeding)    Acute on chronic diastolic heart failure (HCC)    CHF (congestive heart failure) (HCC)    Hypertensive urgency    Uncontrolled type 2 diabetes mellitus with hyperglycemia (HCC)    Chronic obstructive pulmonary disease with acute exacerbation (HCC)    Acute on chronic diastolic (congestive) heart failure (HCC)    SAÚL (acute kidney injury) ([address]    Hypoglycemia    HOCM (hypertrophic obstructive cardiomyopathy) (HCC)    Gastrointestinal hemorrhage    COPD exacerbation (HCC)    Anemia    AVM (arteriovenous malformation)    Symptomatic anemia    Flash pulmonary edema (HCC)    Acute on chronic kidney failure (HCC)    Dysarthria    Chronic fatigue    Acute encephalopathy    Adenomatous polyp of sigmoid colon    Adenomatous polyp of transverse colon    Sepsis (HCC)    Major depressive disorder in remission ([address]    Gastroesophageal reflux disease without esophagitis    Acute cystitis without hematuria    CKD (chronic kidney disease) stage 4, GFR 15-29 ml/min ([facility]    Cardiopulmonary arrest ([facility]    Gait abnormality    Late effects of CVA (cerebrovascular accident)       There are no discontinued medications. Modified Medications    No medications on file       No orders of the defined types were placed in this encounter. Assessment/Plan:    1. Acute combined systolic and diastolic CHF, NYHA class 1 ([facility])   compensated   - Basic Metabolic Panel; Future    2. Essential hypertension   not controlled- refills of Verapamil and Clonidine refilled    3. Mixed hyperlipidemia     4. HOCM (hypertrophic obstructive cardiomyopathy) ([facility])  Continue BB ACEI. And Dig.     5. Hypotension- related to over medicating herself. Counseled. .  Take gatorade and extra fluid today. Stay home and walk with assistance only today. Stop taking extra meds on her own. Discussed with . Counseling:  Heart Healthy Lifestyle, Low Salt Diet, Take Precautions to Prevent Falls and Walk Daily    Return for As scheduled.       Electronically signed by Lilia Paez MD on 3/18/2021 at 1:04 PM

## 2021-03-18 NOTE — ED NOTES
Epinephrine, atropine and sodium bicarb administered by Ida Diver.      Roro Freed RN  03/18/21 [id number]

## 2021-03-18 NOTE — ACP (ADVANCE CARE PLANNING)
Advance Care Planning     Advance Care Planning Activator (Inpatient)  Conversation Note      Date of ACP Conversation: 3/18/2021    Conversation Conducted with: pt's  Christy Christensen. Pt is intubated and is on a ventilatorl    ACP Activator: [address] Decision Maker:     Current Designated Health Care Decision Maker:     Primary Decision Maker: Delano Villanueva - Spouse - [phone]    Secondary Decision Maker: Beto Vázquez Child - [phone]      Care Preferences    Ventilation: \"If you were in your present state of health and suddenly became very ill and were unable to breathe on your own, what would your preference be about the use of a ventilator (breathing machine) if it were available to you? \"      Would the patient desire the use of ventilator (breathing machine)?: yes    \"If your health worsens and it becomes clear that your chance of recovery is unlikely, what would your preference be about the use of a ventilator (breathing machine) if it were available to you? \"     Would the patient desire the use of ventilator (breathing machine)?: Yes      Resuscitation  \"CPR works best to restart the heart when there is a sudden event, like a heart attack, in someone who is otherwise healthy. Unfortunately, CPR does not typically restart the heart for people who have serious health conditions or who are very sick. \"    \"In the event your heart stopped as a result of an underlying serious health condition, would you want attempts to be made to restart your heart (answer \"yes\" for attempt to resuscitate) or would you prefer a natural death (answer \"no\" for do not attempt to resuscitate)? \" yes       [x] Yes   [] No   Educated Patient / Ludwig Oakley regarding differences between Advance Directives and portable DNR orders.     I provided the patient's  with information on advance directives as per his request. At this time with regard to whether pt would receive cpr/vent if her condition were to worsen and she was in a coma or vegetative state he states \"yes\"    Length of ACP Conversation in minutes:  10  Conversation Outcomes:  [x] ACP discussion completed  [] Existing advance directive reviewed with patient; no changes to patient's previously recorded wishes  [] New Advance Directive completed  [] Portable Do Not Rescitate prepared for Provider review and signature  [] POLST/POST/MOLST/MOST prepared for Provider review and signature      Follow-up plan:    [] Schedule follow-up conversation to continue planning  [] Referred individual to Provider for additional questions/concerns   [] Advised patient/agent/surrogate to review completed ACP document and update if needed with changes in condition, patient preferences or care setting    [x] This note routed to one or more involved healthcare providers

## 2021-03-18 NOTE — ED NOTES
Vecuronium 10mg administered per Charlotte Fortune. Epinephrine administered.      Melanie Alaniz RN  03/18/21 5416

## 2021-03-18 NOTE — H&P
Hospital Medicine  History and Physical    Patient:  Bhavani Weber  MRN: [id number]    CHIEF COMPLAINT:    Chief Complaint   Patient presents with    Respiratory Distress       History Obtained From:  Patient, EMR  Primary Care Physician: Belle Gomez MD    HISTORY OF PRESENT ILLNESS:   The patient is a 67 y.o. female with PMH of nonobstructive CAD, diastolic HF, HOCM, HTN, IDDM2, CKD3, anemia, prolonged hospitalization in 66/7146 complicated by cardiac arrest s/p ROSC,requiring transvenous pacing due to persistent bradycardia and hypotension despite pressor support, SAÚL,septic shock who presented with the above CC. History was obtained by  at the bedside, patient has been feeling weak and tired in the last few days, today she took an extra dose of Torsemide and her BP was in the 70s, spoke to cardiology who recommended hydration, later on she started experiencing shortness of breath and became unresponsive, 911 was called, was bradycardiac, hypotensive with agonal breathing upon arrival to ED, intubated, fluid boluses administered, right femoral CVC placed, started on Dopamine and Levophed due to persistent bradycardia, patient suffered asystole / cardiac arrest, ROSC obtained after 16 mins of CPR per report, started on Epinephrine infusion, currently HR in the 50s and SBP in the  range, initial w/u showed anemia with Hb of 5.8, lactic acidosis-6.0, hyperkalemia, hypocalcemia, hypomagnesemia, FOBT was positive, 1 unit of PRBCs was transfused, started on Protonix infusion, sodium bicarbonate and calcium chloride were administered, started on hypothermia protocol and patient will be admitted to ICU for further management.     Past Medical History:      Diagnosis Date    Anxiety     Asthma     dx 2019 / has smoked since age 14    CHF (congestive heart failure) (HCC)     Chronic back pain     Bilateral L5 S1 Radic on emg--surprisingly worse on the left than the right--pt's symptoms and her MRI show worse on the right    Chronic obstructive pulmonary disease with acute exacerbation ([facility] [place] [address] 10/12/2019    Depression     Fibromyalgia     Hyperlipidemia     meds > 8 yrs    Hypertension     meds > 45 yrs    On home O2     2l per n/c at bedtime mostly,     Osteoarthritis     Type II diabetes mellitus, uncontrolled ([facility] [address]     hx > 8 yrs    Unspecified sleep apnea        Past Surgical History:      Procedure Laterality Date    BACK SURGERY  2017    lumbar disc    CARDIAC CATHETERIZATION  11/3/14     DR. MIRELES / no stents    COLONOSCOPY  08/29/2016    w/polypectomy     COLONOSCOPY N/A 9/29/2020    COLONOSCOPY WITH [facility]YPECTOMY [facility] N/A 10/7/2019    EUA HYSTEROSCOPY DILATATION AND CURETTAGE performed by Ronald Ovalle DO at [facility]. [address], COLON, DIAGNOSTIC      EYE SURGERY      [place] with IOL OU / [address] [place]  [address]    umbilical hernia repair    MN ESOPHAGOGASTRODUODENOSCOPY TRANSORAL DIAGNOSTIC N/A 3/24/2017    EGD ESOPHAGOGASTRODUODENOSCOPY performed by Heriberto Valdez MD at [place] [address] 2/8/2018    negative findings    MN REVISE MEDIAN N/CARPAL TUNNEL SURG Left 6/5/2017    LEFT  CARPAL TUNNEL RELEASE performed [facility] Kathrin Box MD at [facility],2+B/S,CWCN[facility] N/A 2/8/2018    TONSILLECTOMY      as child    UPPER GASTROINTESTINAL ENDOSCOPY  08/26/2016    w/bx     UPPER GASTROINTESTINAL ENDOSCOPY N/A 2/25/2020    EGD possible biopsy performed by Cory Haley MD at [address] [place] [address] 7/1/2020    EGD PUSH ENTEROSCOPY performed by Florinda Cushing, MD at [facility]       Medications Prior to Admission:    Prior to Admission medications    Medication Sig Start Date End Date Taking?  Authorizing Provider   insulin lispro (HUMALOG) 100 UNIT/ML injection vial 18 units at each mels 3/17/21   eBbe Masters MD   insulin glargine (LANTUS) 100 UNIT/ML injection vial 60 units at bedtime 3/17/21   Bebe Masters MD   verapamil (CALAN SR) 120 MG extended release tablet Take 1 tablet by mouth daily 3/11/21   Ida Homans, MD   cloNIDine (CATAPRES) 0.2 MG tablet Take 1 tablet by mouth 2 times daily 3/11/21   Ida Homans, MD   rosuvastatin (CRESTOR) 40 MG tablet TAKE 1 TABLET BY MOUTH ONCE DAILY IN THE EVENING 2/12/21   Anup Barakat MD   torsemide [facility]) 100 MG tablet Take 0.5 tablets by mouth daily 2/9/21   Anup Barakat MD   PARoxetine (PAXIL) 40 MG tablet TAKE 1 TABLET BY MOUTH ONCE DAILY IN THE MORNING 1/22/21   Anup Barakat MD   ACCU-CHEK PHYLLIS PLUS strip Test 3x daily Dx E11.65 1/18/21   Bebe Masters MD   Accu-Chek Softclix Lancets MISC bid 1/18/21   Bebe Masters MD   Blood Glucose Monitoring Suppl (ACCU-CHEK PHYLLIS CONNECT) w/Device KIT 1 kit by Does not apply route daily 1/18/21   Bebe Masters MD   blood glucose test strips (ACCU-CHEK PHYLLIS PLUS) strip Patient test 3x daily E65.11 1/18/21   Bebe Masters MD   ipratropium-albuterol (DUONEB) 0.5-2.5 (3) MG/3ML SOLN nebulizer solution Inhale 3 mLs into the lungs 3 times daily 1/5/21   Anup Barakat MD   Continuous Blood Gluc Sensor (FREESTYLE MURALI 14 DAY SENSOR) MISC Use every 2 weeks 12/17/20   Bebe Masters MD   Continuous Blood Gluc  (FREESTYLE MURALI 14 DAY READER) [place] As directed 12/17/20   Bebe Masters MD   traMADol (ULTRAM) 50 MG tablet Take 50 mg by mouth every 6 hours as needed for Pain.     Historical Provider, MD   isosorbide mononitrate (IMDUR) 60 MG extended release tablet Take 1 tablet by mouth daily 10/20/20   Alina Rodriguez DO   carvedilol (COREG) 25 MG tablet Take 1 tablet by mouth 2 times daily 10/20/20   Alina Gravel, DO   spironolactone (ALDACTONE) 25 MG tablet Take 1 tablet by mouth daily 10/20/20   Alina Gravel, DO   pantoprazole (PROTONIX) 40 MG tablet Take 40mg twice daily (1st dose 30min before breakfast) for 30 days. After 30 days, you make take 40mg once daily before breakfast. 20   Erica Maciel DO   dicyclomine (BENTYL) 10 MG capsule Take 1 capsule by mouth 4 times daily as needed (abdominal pain) 19   JOHNATHON Verduzco CNP   ondansetron (ZOFRAN) 4 MG tablet Take 1 tablet by mouth every 8 hours as needed for Nausea or Vomiting 19   JOHNATHON Verduzco CNP       Allergies:  Ibuprofen, Metformin and related, and Darvon [propoxyphene hcl]    Social History:   TOBACCO:   reports that she has quit smoking. Her smoking use included cigarettes. She started smoking about 3 years ago. She has a 59.00 pack-year smoking history. She has never used smokeless tobacco.  ETOH:   reports previous alcohol use. Family History:       Problem Relation Age of Onset    Heart Disease Father         cardiac bypass    Arthritis Father     Arthritis Mother     Other Mother          at age 80    Other Sister         [facility]    No Known Problems Daughter     Stroke Son        REVIEW OF SYSTEMS:  Ten systems reviewed and negative except for stated in HPI    Physical Exam:    Vitals: BP (!) 102/44   Pulse 54   Temp 97.1 °F (36.2 °C)   Resp 16   Wt 143 lb (64.9 kg)   LMP  (LMP Unknown)   SpO2 94%   BMI 28.88 kg/m²   General appearance: intubated, on mechanical ventilation  Skin: pale, no rashes   HEENT: Head: Normocephalic, no lesions, without obvious abnormality.   Neck: supple, symmetrical, trachea midline  Lungs: clear to auscultation bilaterally  Heart: S1S2, bradycardic  Abdomen: soft, BS active  Extremities: no edema  Neurologic: unresponsive    Recent Labs     21  1615 21  1631 21  1818   WBC 7.0  --   --    HGB 5.8* 7.8* 7.8*     --   --      Recent Labs     21  1615 21  1631 21  1818     --   --    K 5.2*  --   --    *  --   --    CO2 13*  --   --    BUN 29*  --   --    CREATININE 1.61* 2.8* 2.4* calcium chloride in ED  - follow closely    Hypomagnesemia   - replaced    IDDM2  - insulin drip    CKD3  - monitor renal function        Darren Mayberry MD  Admitting Hospitalist

## 2021-03-19 ENCOUNTER — APPOINTMENT (OUTPATIENT)
Dept: GENERAL RADIOLOGY | Age: 76
DRG: 296 | End: 2021-03-19
Payer: MEDICARE

## 2021-03-19 LAB
ALBUMIN SERPL-MCNC: 3.3 G/DL (ref 3.5–4.6)
ALP BLD-CCNC: 124 U/L (ref 40–130)
ALT SERPL-CCNC: 331 U/L (ref 0–33)
ANION GAP SERPL CALCULATED.3IONS-SCNC: 10 MEQ/L (ref 9–15)
ANION GAP SERPL CALCULATED.3IONS-SCNC: 12 MEQ/L (ref 9–15)
ANION GAP SERPL CALCULATED.3IONS-SCNC: 14 MEQ/L (ref 9–15)
ANION GAP SERPL CALCULATED.3IONS-SCNC: 16 MEQ/L (ref 9–15)
APTT: 31.9 SEC (ref 24.4–36.8)
AST SERPL-CCNC: 480 U/L (ref 0–35)
BASE EXCESS ARTERIAL: -1 (ref -3–3)
BASE EXCESS ARTERIAL: -2 (ref -3–3)
BASE EXCESS ARTERIAL: -6 (ref -3–3)
BASOPHILS ABSOLUTE: 0 K/UL (ref 0–0.2)
BASOPHILS RELATIVE PERCENT: 0.2 %
BILIRUB SERPL-MCNC: 0.7 MG/DL (ref 0.2–0.7)
BUN BLDV-MCNC: 40 MG/DL (ref 8–23)
BUN BLDV-MCNC: 42 MG/DL (ref 8–23)
BUN BLDV-MCNC: 42 MG/DL (ref 8–23)
BUN BLDV-MCNC: 44 MG/DL (ref 8–23)
CALCIUM IONIZED: 1.24 MMOL/L (ref 1.12–1.32)
CALCIUM IONIZED: 1.25 MMOL/L (ref 1.12–1.32)
CALCIUM IONIZED: 1.31 MMOL/L (ref 1.12–1.32)
CALCIUM SERPL-MCNC: 8.9 MG/DL (ref 8.5–9.9)
CALCIUM SERPL-MCNC: 9.3 MG/DL (ref 8.5–9.9)
CALCIUM SERPL-MCNC: 9.3 MG/DL (ref 8.5–9.9)
CALCIUM SERPL-MCNC: 9.6 MG/DL (ref 8.5–9.9)
CHLORIDE BLD-SCNC: 106 MEQ/L (ref 95–107)
CHLORIDE BLD-SCNC: 106 MEQ/L (ref 95–107)
CHLORIDE BLD-SCNC: 109 MEQ/L (ref 95–107)
CHLORIDE BLD-SCNC: 110 MEQ/L (ref 95–107)
CO2: 18 MEQ/L (ref 20–31)
CO2: 21 MEQ/L (ref 20–31)
CO2: 23 MEQ/L (ref 20–31)
CO2: 24 MEQ/L (ref 20–31)
CREAT SERPL-MCNC: 2.07 MG/DL (ref 0.5–0.9)
CREAT SERPL-MCNC: 2.14 MG/DL (ref 0.5–0.9)
CREAT SERPL-MCNC: 2.39 MG/DL (ref 0.5–0.9)
CREAT SERPL-MCNC: 2.5 MG/DL (ref 0.5–0.9)
DIGOXIN LEVEL: <0.3 NG/ML (ref 0.8–2)
EOSINOPHILS ABSOLUTE: 0 K/UL (ref 0–0.7)
EOSINOPHILS RELATIVE PERCENT: 0.1 %
GFR AFRICAN AMERICAN: 22.6
GFR AFRICAN AMERICAN: 23
GFR AFRICAN AMERICAN: 23.8
GFR AFRICAN AMERICAN: 25
GFR AFRICAN AMERICAN: 26
GFR AFRICAN AMERICAN: 27.1
GFR AFRICAN AMERICAN: 28.1
GFR NON-AFRICAN AMERICAN: 18.7
GFR NON-AFRICAN AMERICAN: 19
GFR NON-AFRICAN AMERICAN: 19.7
GFR NON-AFRICAN AMERICAN: 21
GFR NON-AFRICAN AMERICAN: 22
GFR NON-AFRICAN AMERICAN: 22.4
GFR NON-AFRICAN AMERICAN: 23.2
GLOBULIN: 2.1 G/DL (ref 2.3–3.5)
GLUCOSE BLD-MCNC: 119 MG/DL (ref 60–115)
GLUCOSE BLD-MCNC: 179 MG/DL (ref 70–99)
GLUCOSE BLD-MCNC: 203 MG/DL (ref 60–115)
GLUCOSE BLD-MCNC: 220 MG/DL (ref 60–115)
GLUCOSE BLD-MCNC: 233 MG/DL (ref 60–115)
GLUCOSE BLD-MCNC: 254 MG/DL (ref 60–115)
GLUCOSE BLD-MCNC: 260 MG/DL (ref 60–115)
GLUCOSE BLD-MCNC: 261 MG/DL (ref 60–115)
GLUCOSE BLD-MCNC: 262 MG/DL (ref 70–99)
GLUCOSE BLD-MCNC: 269 MG/DL (ref 60–115)
GLUCOSE BLD-MCNC: 299 MG/DL (ref 60–115)
GLUCOSE BLD-MCNC: 307 MG/DL (ref 70–99)
GLUCOSE BLD-MCNC: 316 MG/DL (ref 60–115)
GLUCOSE BLD-MCNC: 387 MG/DL (ref 60–115)
GLUCOSE BLD-MCNC: 53 MG/DL (ref 60–115)
GLUCOSE BLD-MCNC: 76 MG/DL (ref 60–115)
GLUCOSE BLD-MCNC: 77 MG/DL (ref 60–115)
GLUCOSE BLD-MCNC: 85 MG/DL (ref 60–115)
GLUCOSE BLD-MCNC: 93 MG/DL (ref 70–99)
HBA1C MFR BLD: 9.6 % (ref 4.8–5.9)
HCO3 ARTERIAL: 20.5 MMOL/L (ref 21–29)
HCO3 ARTERIAL: 23.5 MMOL/L (ref 21–29)
HCO3 ARTERIAL: 24.1 MMOL/L (ref 21–29)
HCT VFR BLD CALC: 32.9 % (ref 37–47)
HCT VFR BLD CALC: 33.5 % (ref 37–47)
HEMOGLOBIN: 10.2 GM/DL (ref 12–16)
HEMOGLOBIN: 10.5 G/DL (ref 12–16)
HEMOGLOBIN: 10.8 G/DL (ref 12–16)
HEMOGLOBIN: 10.8 GM/DL (ref 12–16)
HEMOGLOBIN: 9.2 GM/DL (ref 12–16)
INR BLD: 1.3
INR BLD: 1.3
LACTATE: 1.75 MMOL/L (ref 0.4–2)
LACTATE: 2.25 MMOL/L (ref 0.4–2)
LACTATE: 2.32 MMOL/L (ref 0.4–2)
LACTIC ACID: 2 MMOL/L (ref 0.5–2.2)
LACTIC ACID: 3.7 MMOL/L (ref 0.5–2.2)
LACTIC ACID: 3.7 MMOL/L (ref 0.5–2.2)
LV EF: 50 %
LVEF MODALITY: NORMAL
LYMPHOCYTES ABSOLUTE: 0.8 K/UL (ref 1–4.8)
LYMPHOCYTES RELATIVE PERCENT: 5.3 %
MAGNESIUM: 2.1 MG/DL (ref 1.7–2.4)
MAGNESIUM: 3.2 MG/DL (ref 1.7–2.4)
MAGNESIUM: 3.5 MG/DL (ref 1.7–2.4)
MAGNESIUM: 3.9 MG/DL (ref 1.7–2.4)
MCH RBC QN AUTO: 30.1 PG (ref 27–31.3)
MCH RBC QN AUTO: 30.2 PG (ref 27–31.3)
MCHC RBC AUTO-ENTMCNC: 32 % (ref 33–37)
MCHC RBC AUTO-ENTMCNC: 32.1 % (ref 33–37)
MCV RBC AUTO: 93.9 FL (ref 82–100)
MCV RBC AUTO: 94.5 FL (ref 82–100)
MONOCYTES ABSOLUTE: 1 K/UL (ref 0.2–0.8)
MONOCYTES RELATIVE PERCENT: 6.4 %
NEUTROPHILS ABSOLUTE: 13.5 K/UL (ref 1.4–6.5)
NEUTROPHILS RELATIVE PERCENT: 88 %
O2 SAT, ARTERIAL: 100 % (ref 93–100)
O2 SAT, ARTERIAL: 100 % (ref 93–100)
O2 SAT, ARTERIAL: 99 % (ref 93–100)
PCO2 ARTERIAL: 40 MM HG (ref 35–45)
PCO2 ARTERIAL: 43 MM HG (ref 35–45)
PCO2 ARTERIAL: 43 MM HG (ref 35–45)
PDW BLD-RTO: 18.7 % (ref 11.5–14.5)
PDW BLD-RTO: 18.7 % (ref 11.5–14.5)
PERFORMED ON: ABNORMAL
PERFORMED ON: NORMAL
PERFORMED ON: NORMAL
PH ARTERIAL: 7.29 (ref 7.35–7.45)
PH ARTERIAL: 7.35 (ref 7.35–7.45)
PH ARTERIAL: 7.39 (ref 7.35–7.45)
PHOSPHORUS: 4.6 MG/DL (ref 2.3–4.8)
PHOSPHORUS: 4.8 MG/DL (ref 2.3–4.8)
PHOSPHORUS: 5.4 MG/DL (ref 2.3–4.8)
PHOSPHORUS: 6.3 MG/DL (ref 2.3–4.8)
PLATELET # BLD: 291 K/UL (ref 130–400)
PLATELET # BLD: 292 K/UL (ref 130–400)
PO2 ARTERIAL: 119 MM HG (ref 75–108)
PO2 ARTERIAL: 199 MM HG (ref 75–108)
PO2 ARTERIAL: 213 MM HG (ref 75–108)
POC CHLORIDE: 108 MEQ/L (ref 99–110)
POC CHLORIDE: 111 MEQ/L (ref 99–110)
POC CHLORIDE: 112 MEQ/L (ref 99–110)
POC CREATININE: 2.2 MG/DL (ref 0.6–1.2)
POC CREATININE: 2.3 MG/DL (ref 0.6–1.2)
POC CREATININE: 2.5 MG/DL (ref 0.6–1.2)
POC FIO2: 40
POC FIO2: 60
POC FIO2: 75
POC HEMATOCRIT: 27 % (ref 36–48)
POC HEMATOCRIT: 30 % (ref 36–48)
POC HEMATOCRIT: 32 % (ref 36–48)
POC POTASSIUM: 3.9 MEQ/L (ref 3.5–5.1)
POC POTASSIUM: 4.3 MEQ/L (ref 3.5–5.1)
POC POTASSIUM: 7.8 MEQ/L (ref 3.5–5.1)
POC SAMPLE TYPE: ABNORMAL
POC SODIUM: 136 MEQ/L (ref 136–145)
POC SODIUM: 141 MEQ/L (ref 136–145)
POC SODIUM: 145 MEQ/L (ref 136–145)
POTASSIUM SERPL-SCNC: 4.6 MEQ/L (ref 3.4–4.9)
POTASSIUM SERPL-SCNC: 4.7 MEQ/L (ref 3.4–4.9)
POTASSIUM SERPL-SCNC: 4.7 MEQ/L (ref 3.4–4.9)
POTASSIUM SERPL-SCNC: 4.9 MEQ/L (ref 3.4–4.9)
POTASSIUM SERPL-SCNC: 8.2 MEQ/L (ref 3.4–4.9)
PROTHROMBIN TIME: 16.2 SEC (ref 12.3–14.9)
PROTHROMBIN TIME: 16.4 SEC (ref 12.3–14.9)
RBC # BLD: 3.49 M/UL (ref 4.2–5.4)
RBC # BLD: 3.57 M/UL (ref 4.2–5.4)
SODIUM BLD-SCNC: 138 MEQ/L (ref 135–144)
SODIUM BLD-SCNC: 143 MEQ/L (ref 135–144)
SODIUM BLD-SCNC: 143 MEQ/L (ref 135–144)
SODIUM BLD-SCNC: 145 MEQ/L (ref 135–144)
TCO2 ARTERIAL: 22 (ref 22–29)
TCO2 ARTERIAL: 25 (ref 22–29)
TCO2 ARTERIAL: 25 (ref 22–29)
TOTAL PROTEIN: 5.4 G/DL (ref 6.3–8)
TROPONIN: 0.06 NG/ML (ref 0–0.01)
TROPONIN: 0.07 NG/ML (ref 0–0.01)
WBC # BLD: 15.4 K/UL (ref 4.8–10.8)
WBC # BLD: 19.9 K/UL (ref 4.8–10.8)

## 2021-03-19 PROCEDURE — 85610 PROTHROMBIN TIME: CPT

## 2021-03-19 PROCEDURE — 6370000000 HC RX 637 (ALT 250 FOR IP): Performed by: INTERNAL MEDICINE

## 2021-03-19 PROCEDURE — 6360000002 HC RX W HCPCS: Performed by: INTERNAL MEDICINE

## 2021-03-19 PROCEDURE — 84132 ASSAY OF SERUM POTASSIUM: CPT

## 2021-03-19 PROCEDURE — 36415 COLL VENOUS BLD VENIPUNCTURE: CPT

## 2021-03-19 PROCEDURE — C9113 INJ PANTOPRAZOLE SODIUM, VIA: HCPCS | Performed by: INTERNAL MEDICINE

## 2021-03-19 PROCEDURE — 82948 REAGENT STRIP/BLOOD GLUCOSE: CPT

## 2021-03-19 PROCEDURE — 80162 ASSAY OF DIGOXIN TOTAL: CPT

## 2021-03-19 PROCEDURE — 99291 CRITICAL CARE FIRST HOUR: CPT | Performed by: INTERNAL MEDICINE

## 2021-03-19 PROCEDURE — 36592 COLLECT BLOOD FROM PICC: CPT

## 2021-03-19 PROCEDURE — 2580000003 HC RX 258: Performed by: INTERNAL MEDICINE

## 2021-03-19 PROCEDURE — 94761 N-INVAS EAR/PLS OXIMETRY MLT: CPT

## 2021-03-19 PROCEDURE — 82803 BLOOD GASES ANY COMBINATION: CPT

## 2021-03-19 PROCEDURE — 37799 UNLISTED PX VASCULAR SURGERY: CPT

## 2021-03-19 PROCEDURE — 83605 ASSAY OF LACTIC ACID: CPT

## 2021-03-19 PROCEDURE — 2500000003 HC RX 250 WO HCPCS: Performed by: INTERNAL MEDICINE

## 2021-03-19 PROCEDURE — 2700000000 HC OXYGEN THERAPY PER DAY

## 2021-03-19 PROCEDURE — 85014 HEMATOCRIT: CPT

## 2021-03-19 PROCEDURE — 94760 N-INVAS EAR/PLS OXIMETRY 1: CPT

## 2021-03-19 PROCEDURE — 84100 ASSAY OF PHOSPHORUS: CPT

## 2021-03-19 PROCEDURE — 99221 1ST HOSP IP/OBS SF/LOW 40: CPT | Performed by: INTERNAL MEDICINE

## 2021-03-19 PROCEDURE — 83735 ASSAY OF MAGNESIUM: CPT

## 2021-03-19 PROCEDURE — 84484 ASSAY OF TROPONIN QUANT: CPT

## 2021-03-19 PROCEDURE — 94003 VENT MGMT INPAT SUBQ DAY: CPT

## 2021-03-19 PROCEDURE — 85027 COMPLETE CBC AUTOMATED: CPT

## 2021-03-19 PROCEDURE — 82565 ASSAY OF CREATININE: CPT

## 2021-03-19 PROCEDURE — 80053 COMPREHEN METABOLIC PANEL: CPT

## 2021-03-19 PROCEDURE — 93306 TTE W/DOPPLER COMPLETE: CPT

## 2021-03-19 PROCEDURE — 94640 AIRWAY INHALATION TREATMENT: CPT

## 2021-03-19 PROCEDURE — 85025 COMPLETE CBC W/AUTO DIFF WBC: CPT

## 2021-03-19 PROCEDURE — 93010 ELECTROCARDIOGRAM REPORT: CPT | Performed by: INTERNAL MEDICINE

## 2021-03-19 PROCEDURE — 82435 ASSAY OF BLOOD CHLORIDE: CPT

## 2021-03-19 PROCEDURE — 82330 ASSAY OF CALCIUM: CPT

## 2021-03-19 PROCEDURE — 84295 ASSAY OF SERUM SODIUM: CPT

## 2021-03-19 PROCEDURE — 2000000000 HC ICU R&B

## 2021-03-19 PROCEDURE — 36600 WITHDRAWAL OF ARTERIAL BLOOD: CPT

## 2021-03-19 PROCEDURE — 85730 THROMBOPLASTIN TIME PARTIAL: CPT

## 2021-03-19 PROCEDURE — 71045 X-RAY EXAM CHEST 1 VIEW: CPT

## 2021-03-19 PROCEDURE — 83036 HEMOGLOBIN GLYCOSYLATED A1C: CPT

## 2021-03-19 RX ORDER — LABETALOL HYDROCHLORIDE 5 MG/ML
40 INJECTION, SOLUTION INTRAVENOUS EVERY 4 HOURS PRN
Status: DISCONTINUED | OUTPATIENT
Start: 2021-03-19 | End: 2021-04-02 | Stop reason: HOSPADM

## 2021-03-19 RX ORDER — DEXTROSE MONOHYDRATE 50 MG/ML
100 INJECTION, SOLUTION INTRAVENOUS PRN
Status: DISCONTINUED | OUTPATIENT
Start: 2021-03-19 | End: 2021-03-26 | Stop reason: SDUPTHER

## 2021-03-19 RX ORDER — SODIUM CHLORIDE 9 MG/ML
INJECTION, SOLUTION INTRAVENOUS
Status: DISPENSED
Start: 2021-03-19 | End: 2021-03-19

## 2021-03-19 RX ORDER — LABETALOL HYDROCHLORIDE 5 MG/ML
20 INJECTION, SOLUTION INTRAVENOUS EVERY 4 HOURS PRN
Status: DISCONTINUED | OUTPATIENT
Start: 2021-03-19 | End: 2021-04-02 | Stop reason: HOSPADM

## 2021-03-19 RX ORDER — DEXTROSE MONOHYDRATE 25 G/50ML
12.5 INJECTION, SOLUTION INTRAVENOUS PRN
Status: DISCONTINUED | OUTPATIENT
Start: 2021-03-19 | End: 2021-03-26 | Stop reason: SDUPTHER

## 2021-03-19 RX ORDER — PROPOFOL 10 MG/ML
5-50 INJECTION, EMULSION INTRAVENOUS
Status: DISCONTINUED | OUTPATIENT
Start: 2021-03-19 | End: 2021-03-26

## 2021-03-19 RX ORDER — HYDRALAZINE HYDROCHLORIDE 20 MG/ML
10 INJECTION INTRAMUSCULAR; INTRAVENOUS EVERY 4 HOURS PRN
Status: DISCONTINUED | OUTPATIENT
Start: 2021-03-19 | End: 2021-04-02 | Stop reason: HOSPADM

## 2021-03-19 RX ORDER — HYDRALAZINE HYDROCHLORIDE 20 MG/ML
20 INJECTION INTRAMUSCULAR; INTRAVENOUS EVERY 4 HOURS PRN
Status: DISCONTINUED | OUTPATIENT
Start: 2021-03-19 | End: 2021-04-02 | Stop reason: HOSPADM

## 2021-03-19 RX ORDER — DEXTROSE MONOHYDRATE 25 G/50ML
25 INJECTION, SOLUTION INTRAVENOUS ONCE
Status: COMPLETED | OUTPATIENT
Start: 2021-03-19 | End: 2021-03-19

## 2021-03-19 RX ORDER — NICOTINE POLACRILEX 4 MG
15 LOZENGE BUCCAL PRN
Status: DISCONTINUED | OUTPATIENT
Start: 2021-03-19 | End: 2021-03-26 | Stop reason: SDUPTHER

## 2021-03-19 RX ADMIN — DEXTROSE MONOHYDRATE 12.5 G: 25 INJECTION, SOLUTION INTRAVENOUS at 14:42

## 2021-03-19 RX ADMIN — INSULIN HUMAN 10 UNITS: 100 INJECTION, SOLUTION PARENTERAL at 02:37

## 2021-03-19 RX ADMIN — CALCIUM GLUCONATE 1000 MG: 98 INJECTION, SOLUTION INTRAVENOUS at 02:37

## 2021-03-19 RX ADMIN — NOREPINEPHRINE BITARTRATE 15 MCG/MIN: 1 INJECTION INTRAVENOUS at 01:21

## 2021-03-19 RX ADMIN — Medication 10 ML: at 08:30

## 2021-03-19 RX ADMIN — SODIUM CHLORIDE 8 MG/HR: 9 INJECTION, SOLUTION INTRAVENOUS at 19:30

## 2021-03-19 RX ADMIN — IPRATROPIUM BROMIDE AND ALBUTEROL SULFATE 1 AMPULE: .5; 3 SOLUTION RESPIRATORY (INHALATION) at 10:40

## 2021-03-19 RX ADMIN — LABETALOL HYDROCHLORIDE 20 MG: 5 INJECTION, SOLUTION INTRAVENOUS at 17:01

## 2021-03-19 RX ADMIN — SODIUM ZIRCONIUM CYCLOSILICATE 10 G: 10 POWDER, FOR SUSPENSION ORAL at 02:37

## 2021-03-19 RX ADMIN — SODIUM CHLORIDE 7.1 UNITS/HR: 9 INJECTION, SOLUTION INTRAVENOUS at 05:49

## 2021-03-19 RX ADMIN — SODIUM CHLORIDE, POTASSIUM CHLORIDE, SODIUM LACTATE AND CALCIUM CHLORIDE: 600; 310; 30; 20 INJECTION, SOLUTION INTRAVENOUS at 01:21

## 2021-03-19 RX ADMIN — INSULIN LISPRO 5 UNITS: 100 INJECTION, SOLUTION INTRAVENOUS; SUBCUTANEOUS at 03:54

## 2021-03-19 RX ADMIN — IPRATROPIUM BROMIDE AND ALBUTEROL SULFATE 1 AMPULE: .5; 3 SOLUTION RESPIRATORY (INHALATION) at 04:05

## 2021-03-19 RX ADMIN — MAGNESIUM SULFATE HEPTAHYDRATE 4000 MG: 40 INJECTION, SOLUTION INTRAVENOUS at 01:21

## 2021-03-19 RX ADMIN — SODIUM CHLORIDE 8 MG/HR: 9 INJECTION, SOLUTION INTRAVENOUS at 09:42

## 2021-03-19 RX ADMIN — SODIUM BICARBONATE 100 MEQ: 84 INJECTION, SOLUTION INTRAVENOUS at 02:43

## 2021-03-19 RX ADMIN — Medication 10 ML: at 21:11

## 2021-03-19 RX ADMIN — PROPOFOL 10 MCG/KG/MIN: 10 INJECTION, EMULSION INTRAVENOUS at 17:41

## 2021-03-19 RX ADMIN — HYDRALAZINE HYDROCHLORIDE 10 MG: 20 INJECTION INTRAMUSCULAR; INTRAVENOUS at 05:32

## 2021-03-19 RX ADMIN — CHLORHEXIDINE GLUCONATE 15 ML: 1.2 RINSE ORAL at 08:27

## 2021-03-19 RX ADMIN — IPRATROPIUM BROMIDE AND ALBUTEROL SULFATE 1 AMPULE: .5; 3 SOLUTION RESPIRATORY (INHALATION) at 15:48

## 2021-03-19 RX ADMIN — IPRATROPIUM BROMIDE AND ALBUTEROL SULFATE 1 AMPULE: .5; 3 SOLUTION RESPIRATORY (INHALATION) at 19:16

## 2021-03-19 RX ADMIN — SODIUM CHLORIDE 8 MG/HR: 9 INJECTION, SOLUTION INTRAVENOUS at 01:21

## 2021-03-19 RX ADMIN — LABETALOL HYDROCHLORIDE 40 MG: 5 INJECTION, SOLUTION INTRAVENOUS at 08:27

## 2021-03-19 RX ADMIN — HYDRALAZINE HYDROCHLORIDE 10 MG: 20 INJECTION INTRAMUSCULAR; INTRAVENOUS at 06:06

## 2021-03-19 RX ADMIN — CHLORHEXIDINE GLUCONATE 15 ML: 1.2 RINSE ORAL at 21:00

## 2021-03-19 RX ADMIN — DEXTROSE MONOHYDRATE 25 G: 25 INJECTION, SOLUTION INTRAVENOUS at 02:41

## 2021-03-19 ASSESSMENT — PULMONARY FUNCTION TESTS
PIF_VALUE: 27
PIF_VALUE: 22
PIF_VALUE: 23
PIF_VALUE: 24
PIF_VALUE: 23
PIF_VALUE: 22
PIF_VALUE: 24
PIF_VALUE: 25
PIF_VALUE: 23
PIF_VALUE: 23
PIF_VALUE: 22
PIF_VALUE: 24
PIF_VALUE: 24
PIF_VALUE: 23
PIF_VALUE: 24
PIF_VALUE: 22
PIF_VALUE: 24
PIF_VALUE: 23
PIF_VALUE: 22
PIF_VALUE: 24
PIF_VALUE: 23
PIF_VALUE: 24
PIF_VALUE: 26
PIF_VALUE: 23
PIF_VALUE: 24
PIF_VALUE: 23
PIF_VALUE: 23
PIF_VALUE: 25
PIF_VALUE: 22
PIF_VALUE: 24
PIF_VALUE: 24
PIF_VALUE: 23
PIF_VALUE: 24
PIF_VALUE: 23
PIF_VALUE: 22
PIF_VALUE: 24
PIF_VALUE: 24
PIF_VALUE: 23
PIF_VALUE: 32
PIF_VALUE: 22
PIF_VALUE: 24

## 2021-03-19 ASSESSMENT — PAIN SCALES - GENERAL
PAINLEVEL_OUTOF10: 0

## 2021-03-19 NOTE — ED NOTES
Patient noted to decrease heart rate. Dopamine increased without change in heart rate. Dr Valerio Montanez notified and in room. Attempted to externally pace patient and was unable to capture. Patient SPO2 decreasing and pulses not palpable. Code blue called, see documentation.       Mikael Pascual RN  03/18/21 2010

## 2021-03-19 NOTE — PLAN OF CARE
Nutrition Problem #1: Inadequate oral intake  Intervention: Food and/or Nutrient Delivery: Continue NPO, Start Tube Feeding(Begin TF when okay with GI. Semi-elelemental TF ( Vital 1.2) @ 20 ml/hr x 24 hfs via OG.  Increase, as tolerated to a goal rate of 45 ml/hr ( 1296 kcals, 81 g protein,  875 ml free water), Flush 100 ml, 4 x daily)  Nutritional Goals: initiation of EN when medically appropriate

## 2021-03-19 NOTE — CONSULTS
Consults    Patient Name: Jayda Metz  Admit Date: 3/18/2021  4:00 PM  MR #: [id number]  : 1945    Attending Physician: Caio Agarwal MD  Reason for consult: CPA    History of Presenting Illness:      Jayda Metz is a 67 y.o. female on hospital day 1 with a history of . History Obtained From:  patient, electronic medical record    Pt's  called my office yesterday PM.  Pt Took an extra dose to Torsemide on her own to try to urinate more. She became weak and dizzy. BP was 79/56 per . Pt was awake and orineted at the time per . I advised Juices/gatorade and have her sit for a while. She presented to ER few hours later and she coded and intubated. I calledhusband this am to understand what transpired. He gave the juice as instructed. She felt soome better. She went to bed to lie down. She took a nap for a little while. He then gave more water. He then  heard her gasping for air and became unresponsive. He called 911. She was not breathing for a while until squad came. She had prolonged CPR  History:      EKG: SB58  Past Medical History:   Diagnosis Date    Anxiety     Asthma     dx 2019 / has smoked since age 11    CHF (congestive heart failure) (HCC)     Chronic back pain     Bilateral L5 S1 Radic on emg--surprisingly worse on the left than the right--pt's symptoms and her MRI show worse on the right    Chronic obstructive pulmonary disease with acute exacerbation ([facility] [address] 10/12/2019    Depression     Fibromyalgia     Hyperlipidemia     meds > 8 yrs    Hypertension     meds > 45 yrs    On home O2     2l per n/c at bedtime mostly,     Osteoarthritis     Type II diabetes mellitus, uncontrolled ([address]     hx > 8 yrs    Unspecified sleep apnea      Past Surgical History:   Procedure Laterality Date    BACK SURGERY  2017    lumbar disc    CARDIAC CATHETERIZATION  11/3/14     DR. MIRELES / no stents    COLONOSCOPY  2016    w/polypectomy     COLONOSCOPY N/A 2020    COLONOSCOPY WITH POLYPECTOMY performed [facility] Yob[facility]stevez [facility] N/A 10/7/2019    EUA HYSTEROSCOPY DILATATION AND CURETTAGE performed by Marco Antonio Pichardo DO at [facility]. [address], COLON, DIAGNOSTIC      EYE SURGERY      [place] with IOL OU / [address] [place]  [address]    umbilical hernia repair    WI ESOPHAGOGASTRODUODENOSCOPY TRANSORAL DIAGNOSTIC N/A 3/24/2017    EGD ESOPHAGOGASTRODUODENOSCOPY performed by Neeta Lawrence MD at [facility] N/A 2018    negative findings    WI REVISE MEDIAN N/CARPAL TUNNEL SURG Left 2017    LEFT  CARPAL TUNNEL RELEASE performed by Ventura Ocasio [facility] at [facility]S,M[facility] N/A 2018    TONSILLECTOMY      as child    UPPER GASTROINTESTINAL ENDOSCOPY  2016    w/bx     UPPER GASTROINTESTINAL ENDOSCOPY N/A 2020    EGD possible biopsy performed by Yobani Estevez MD at [address] [place] [address] N/A 2020    EGD PUSH ENTEROSCOPY performed by Rafa La MD at [facility]       Family History  Family History   Problem Relation Age of Onset    Heart Disease Father         cardiac bypass    Arthritis Father     Arthritis Mother     Other Mother          at age 80    Other Sister         nuknown health hx    No Known Problems Daughter     Stroke Son      [] Unable to obtain due to ventilated and/ or neurologic status    Social History     Socioeconomic History    Marital status:      Spouse name: Cuca Betts Number of children: 2    Years of education: 15    Highest education level: High school graduate   Occupational History    Occupation: Retired-   Social Needs    Financial resource strain: Not hard at all   [place] insecurity     Worry: Never true     Inability: Never true   [employer] needs     Medical: No Non-medical: No   Tobacco Use    Smoking status: Former Smoker     Packs/day: 1.00     Years: 59.00     Pack years: 59.00     Types: Cigarettes     Start date: 11/30/2017    Smokeless tobacco: Never Used    Tobacco comment: quit 2-3 weeks ago    Substance and Sexual Activity    Alcohol use: Not Currently    Drug use: No    Sexual activity: Yes     Partners: Male   Lifestyle    Physical activity     Days per week: 0 days     Minutes per session: 0 min    Stress:  Only a little   Relationships    Social connections     Talks on phone: More than three times a week     Gets together: More than three times a week     Attends Yazidism service: 1 to 4 times per year     Active member of club or organization: No     Attends meetings of clubs or organizations: Never     Relationship status: Living with partner    Intimate partner violence     Fear of current or ex partner: No     Emotionally abused: No     Physically abused: No     Forced sexual activity: No   Other Topics Concern    Not on file   Social History Narrative    Grew up in [place]     Lives With:  [id number] Spouse    Type of Home: House    Home Layout: Two level, Performs ADL's on one level    Home Access: Stairs to enter with rails    Entrance Stairs - Number of Steps: 4    Bathroom Shower/Tub: Tub/Shower unit, Doors    Bathroom Equipment: Shower chair, Grab bars in [address] [place]: Rolling walker, Cane, Oxygen(uses her O2 very seldom)    ADL Assistance: Needs assistance    Homemaking Assistance: Needs assistance    Homemaking Responsibilities: No(spouse performs)    Ambulation Assistance: Independent    Transfer Assistance: Independent    Active : No    Occupation: Retired    Type of occupation: worked in [employer]: patient enjoys televsision      [] Unable to obtain due to ventilated and/ or neurologic status      Home Medications:      Medications Prior to Admission: insulin lispro (HUMALOG) 100 UNIT/ML injection vial, 18 units at each mels  insulin glargine (LANTUS) 100 UNIT/ML injection vial, 60 units at bedtime  verapamil (CALAN SR) 120 MG extended release tablet, Take 1 tablet by mouth daily  cloNIDine (CATAPRES) 0.2 MG tablet, Take 1 tablet by mouth 2 times daily  rosuvastatin (CRESTOR) 40 MG tablet, TAKE 1 TABLET BY MOUTH ONCE DAILY IN THE EVENING  torsemide (DEMADEX) 100 MG tablet, Take 0.5 tablets by mouth daily  PARoxetine (PAXIL) 40 MG tablet, TAKE 1 TABLET BY MOUTH ONCE DAILY IN THE MORNING  ACCU-CHEK PHYLLIS PLUS strip, Test 3x daily Dx E11.65  Accu-Chek Softclix Lancets MISC, bid  Blood Glucose Monitoring Suppl (ACCU-CHEK PHYLLIS CONNECT) w/Device KIT, 1 kit by Does not apply route daily  blood glucose test strips (ACCU-CHEK PHYLLIS PLUS) strip, Patient test 3x daily E65.11  ipratropium-albuterol (DUONEB) 0.5-2.5 (3) MG/3ML SOLN nebulizer solution, Inhale 3 mLs into the lungs 3 times daily  Continuous Blood Gluc Sensor (FREESTYLE MURALI 14 DAY SENSOR) [facility], Use every 2 weeks  Continuous Blood Gluc  (FREESTYLE MURALI 14 DAY READER) [place], As directed  traMADol (ULTRAM) 50 MG tablet, Take 50 mg by mouth every 6 hours as needed for Pain.  isosorbide mononitrate (IMDUR) 60 MG extended release tablet, Take 1 tablet by mouth daily  carvedilol (COREG) 25 MG tablet, Take 1 tablet by mouth 2 times daily  spironolactone (ALDACTONE) 25 MG tablet, Take 1 tablet by mouth daily  pantoprazole (PROTONIX) 40 MG tablet, Take 40mg twice daily (1st dose 30min before breakfast) for 30 days.  After 30 days, you make take 40mg once daily before breakfast.  dicyclomine (BENTYL) 10 MG capsule, Take 1 capsule by mouth 4 times daily as needed (abdominal pain)  ondansetron (ZOFRAN) 4 MG tablet, Take 1 tablet by mouth every 8 hours as needed for Nausea or Vomiting    Current Hospital Medications:     Scheduled Meds:   insulin lispro  0-6 Units Subcutaneous 6 times per day    pantoprazole (PROTONIX) bolus  80 mg Intravenous Once    cyanosis. Nails show no clubbing. Results/ Medications reviewed 3/19/2021, 12:23 PM     Laboratory, Microbiology, Pathology, Radiology, Cardiology, Medications and Transcriptions reviewed  Scheduled Meds:   insulin lispro  0-6 Units Subcutaneous 6 times per day    pantoprazole (PROTONIX) bolus  80 mg Intravenous Once    chlorhexidine  15 mL Mouth/Throat BID    sodium chloride flush  10 mL Intravenous 2 times per day    ipratropium-albuterol  1 ampule Inhalation 4x daily     Continuous Infusions:   insulin 10.01 Units/hr (03/19/21 1100)    dextrose      pantoprozole (PROTONIX) infusion 8 mg/hr (03/19/21 0942)    lactated ringers Stopped (03/19/21 0410)       Recent Labs     03/18/21  1615 03/18/21  1615 03/19/21  0051 03/19/21  0556 03/19/21  0611   WBC 7.0  --  19.9* 15.4*  --    HGB 5.8*   < > 10.5* 10.8* 10.2*   HCT 18.9*  --  32.9* 33.5*  --    .9*  --  94.5 93.9  --      --  291 292  --     < > = values in this interval not displayed. Recent Labs     03/18/21  1615 03/18/21  1615 03/19/21  0045 03/19/21  0557 03/19/21  0611     --  138 143  --    K 5.2*  --  8.2* 4.7  --    *  --  106 106  --    CO2 13*  --  18* 21  --    PHOS  --   --  6.3* 4.6  --    BUN 29*  --  44* 42*  --    CREATININE 1.61*   < > 2.39* 2.14* 2.3*    < > = values in this interval not displayed. Recent Labs     03/18/21  1615 03/19/21  0557   AST 26 480*   ALT 20 331*   BILITOT 0.3 0.7   ALKPHOS 56 124     No results for input(s): LIPASE, AMYLASE in the last 72 hours. Recent Labs     03/18/21  1615 03/18/21  1630 03/19/21  0557 03/19/21  0600   PROT 3.3*  --  5.4*  --    INR  --  1.5  --  1.3     Xr Chest Portable    Result Date: 3/19/2021  EXAMINATION: XR CHEST PORTABLE CLINICAL HISTORY: INTUBATION COMPARISONS: OCTOBER 9, 2020 FINDINGS: Endotracheal tube has migrated 3 cm inferiorly, just lying cephalad to mojgan. Nasogastric tube courses beneath diaphragm. Osseous structures intact.

## 2021-03-19 NOTE — PROGRESS NOTES
Physician Progress Note      PATIENTMiller Bamberger  CSN #:                  [id number]  :                       1945  ADMIT DATE:       3/18/2021 4:00 PM  [address] [place] DATE:  RESPONDING  PROVIDER #:        Nicole Vee MD          QUERY TEXT:    Dear Attending:    Patient admitted with  acute respiratory failure, s/p cardiac arrest and has   anemia documented. If possible, please document in progress notes and   discharge summary further specificity regarding the acuity and type of anemia:      The medical record reflects the following:  Risk Factors: hx CAD  diastolic HF CKD 3 anemia  Clinical Indicators: h/h 5.8/18.9   10.8/33.5;  per Dr Mio Soares  3/18/21:    acute /chronic anemia Hb of 5.8 on arrival FOBT was +  Treatment:  1 unit PRBCs h/h  consult GI protonix infusion    Thank you,  Dora Hull BSN RN CDS  contact [facility]  or  [email or web address] w/ any questions  Options provided:  -- Anemia due to acute blood loss  -- Anemia due to chronic blood loss  -- Anemia due to acute on chronic blood loss  -- Anemia due to iron deficiency  -- Anemia due to iron deficiency  -- Other - I will add my own diagnosis  -- Disagree - Not applicable / Not valid  -- Disagree - Clinically unable to determine / Unknown  -- Refer to Clinical Documentation Reviewer    PROVIDER RESPONSE TEXT:    This patient has acute on chronic blood loss anemia.     Query created by: Opal Guerrero on 3/19/2021 2:34 PM      Electronically signed by:  Nicole Vee MD 3/19/2021 3:35 PM

## 2021-03-19 NOTE — PROGRESS NOTES
Shift summary  -patient arrived to icu approximately 1930  Patient was on arctic sun upon arrival.     -assessment completed 2000: see flow sheets   - patient has no gag, pupillary  response and is unresponsive. She  does not withdraw from pain.    -arterial line was inserted into right radial    -all vasopressors were titrated down throughout the night and were stopped at 0300. - patient became hypertensive.  Blood pressure ranged from 210's to 180's   -administered antihypertensive  medications per order: see MAR    - patient was started on an insulin drip at 0600    - 0700 handoff report was given bedside

## 2021-03-19 NOTE — PROGRESS NOTES
*  --  106 106  --    CO2 13*  --  18* 21  --    BUN 29*  --  44* 42*  --    CREATININE 1.61*   < > 2.39* 2.14* 2.3*   CALCIUM 5.5*  --  8.9 9.6  --    PHOS  --   --  6.3* 4.6  --     < > = values in this interval not displayed. Recent Labs     03/18/21  1615 03/19/21  0557   AST 26 480*   ALT 20 331*   BILITOT 0.3 0.7   ALKPHOS 56 124     Recent Labs     03/18/21  1630 03/19/21  0600   INR 1.5 1.3     Recent Labs     03/18/21  1615 03/18/21  2300 03/19/21  0557   TROPONINI 0.048* 0.067* 0.056*       Urinalysis:      Lab Results   Component Value Date    NITRU Negative 10/18/2020    WBCUA 6-9 10/18/2020    BACTERIA Negative 10/18/2020    RBCUA 3-5 10/18/2020    BLOODU SMALL 10/18/2020    SPECGRAV 1.009 10/18/2020    GLUCOSEU Negative 10/18/2020       Radiology:  XR CHEST PORTABLE   Final Result   BIBASILAR SUBSEGMENTAL ATELECTASIS/PNEUMONIA IN THIS PARTIALLY EXPIRATORY CHEST RADIOGRAPH. XR CHEST PORTABLE   Final Result   RIGHT LOWER LUNG SUBSEGMENTAL ATELECTASIS/PNEUMONIA. INTERVAL INFERIOR MIGRATION ENDOTRACHEAL TUBE 3 CM WITH TIP NOW JUST PROXIMAL TO FAISAL. MAY WISH TO RETRACT ENDOTRACHEAL TUBE 2 CM.       CT HEAD WO CONTRAST    (Results Pending)           Assessment/Plan:    67 y.o. female with PMH of nonobstructive CAD, diastolic HF, HOCM, HTN, IDDM2, CKD3, anemia, prolonged hospitalization in 28/2016 complicated by cardiac arrest s/p ROSC,requiring transvenous pacing due to persistent bradycardia and hypotension despite pressor support, SAÚL,septic shock who presented with:     Cardiac arrest s/p ROSC   - required Dopamine, norepinephrine and epinephrine infusion  - off pressors  - on hypothermia protocol  - cardiology service to follow     Acute hypoxic respiratory failure  - requiring intubation and mechanical ventilation  - management per critical care service    Acute encephalopathy  - likely hypoxic-ischemic etiology from cardiac arrest  - remains unresponsive off sedation  - neurology consult     Acute / chronic anemia   - with Hb of 5.8 on arrival, FOBT was positive  - improved s/p one unit of PRBCs given in ED  - on Protonix infusion  - GI consulted  - follow H/H closely     Lactic acidosis  - in the setting of cardiac arrest  - improved with volume repletion     Hyperkalemia  - resolved     SAÚL/CKD3  - on IVFs  - nephrology consulted     IDDM2 with hyperglycemia  - on insulin drip     Leukocytosis   - trending down, follow trend    Code status - DNRCCA,poor prognosis         Electronically signed by Lenard Sarabia MD on 3/19/2021 at 9:57 AM

## 2021-03-19 NOTE — CARE COORDINATION
Multidisciplinary rounds held,  at bedside, pt currently, intubated, on insulin gtt, no sedation and on arctic soon. Rcvg labetolol and hydralazine prn. Echo to be done, tech here.  distraught, will wait to speak to him since he has many questions re: expected outcome/ prognosis for the doctor,  who has agreed to discuss with him after rounds.  Electronically signed by Dinah Contreras RN on 3/19/2021 at 11:03 AM

## 2021-03-19 NOTE — PROGRESS NOTES
Comprehensive Nutrition Assessment    Type and Reason for Visit:  Initial, Consult(TF order and manage \" when okay with GI\")    Nutrition Recommendations/Plan:   Continue NPO  Begin TF when okay with GI. Semi-elelemental TF ( Vital 1.2) @ 20 ml/hr x 24 hrs via OG. On day 2, increase, as tolerated to a goal rate of 45 ml/hr   This will deliver ~ 1296 kcals, 81 g protein,  875 ml free water   Flush 100 ml, 4 x daily  Monitor renal labs    Nutrition Assessment:  Nutritional status appears adequate  upon admission, now at risk due to intubation, Currently on therputic hypothermia, awaiting GI eval, Begin EN when medically appropriate    Malnutrition Assessment:  Malnutrition Status: At risk for malnutrition (Comment)    Context:  Acute Illness     Findings of the 6 clinical characteristics of malnutrition:  Energy Intake:  No significant decrease in energy intake  Weight Loss:  No significant weight loss     Body Fat Loss:  No significant body fat loss     Muscle Mass Loss:  No significant muscle mass loss    Fluid Accumulation:  No significant fluid accumulation     Strength:  Not Performed    Estimated Daily Nutrient Needs:  Energy (kcal):  2823-2762 kcals @ 22-2 5 kcal/kg; Weight Used for Energy Requirements:  Admission(65 kg)     Protein (g):  ~ 86 g protein @ 2g/kg IBW; Weight Used for Protein Requirements:  Ideal(43 kg)        Fluid (ml/day):  ~5023-8775 ml @ 25-28 ml/kg IBW; Method Used for Fluid Requirements:  ml/Kg(43 kg)      Nutrition Related Findings:  intubated 3/18 after cardiac arrest, unresponsive on vent, no sedation, OG in place, To start re-warming process tonight. Last BM PTA,, GI consult for low Hgb.  okay to start trophic EN when olay with GI, Labs noted K = 5.2-4.7, Bun = 29-42, Creat = 1.61-2.14, GLuco 117-307, Hx : CHF, COPD,  HTN , DM      Wounds:  None       Current Nutrition Therapies:    Diet NPO Effective Now    Anthropometric Measures:  · Height: 4' 11\" (149.9 cm)  · Current Body Weight: 157 lb (71.2 kg)   · Admission Body Weight: 143 lb (64.9 kg)    · Usual Body Weight: 146 lb (66.2 kg)(( 10/20), 138# ( 3/20))     · Ideal Body Weight: 95 lbs;   · BMI: 31.7 ( 28 per admit/UBW)  · BMI Categories: Overweight (BMI 25.0-29.9)(Based on UBW/Admit weight)       Nutrition Diagnosis:   · Inadequate oral intake related to impaired respiratory function as evidenced by NPO or clear liquid status due to medical condition, intubation      Nutrition Interventions:   Food and/or Nutrient Delivery:  Continue NPO, Start Tube Feeding(Begin TF when okay with GI. Semi-elelemental TF ( Vital 1.2) @ 20 ml/hr x 24 hfs via OG. Increase, as tolerated to a goal rate of 45 ml/hr ( 1296 kcals, 81 g protein,  875 ml free water), Flush 100 ml, 4 x daily)  Nutrition Education/Counseling:  Education not indicated   Coordination of Nutrition Care:  Continue to monitor while inpatient    Goals:  initiation of EN when medically appropriate       Nutrition Monitoring and Evaluation:     Food/Nutrient Intake Outcomes:  Enteral Nutrition Intake/Tolerance  Physical Signs/Symptoms Outcomes:  Weight, Hemodynamic Status, GI Status, Biochemical Data     Discharge Planning:     Too soon to determine     Electronically signed by Romana Jorge RD, LD on 3/19/21 at 10:50 AM EDT

## 2021-03-19 NOTE — CONSULTS
Inpatient consult to GI  Consult performed by: Juliana Diaz MD  Consult ordered by: Caio Agarwal MD          Patient Name: Jayda Metz  Admit Date: 3/18/2021  4:00 PM  MR #: [id number]  : 1945    Attending Physician: Caio Agarwal MD  Reason for consult:anemia    History of Presenting Illness:      Jayda Metz is a 67 y.o. female on hospital day 1 with a history of COPD-at home O2, CHF, fibromyalgia, hyperlipidemia, hypertension, osteoarthritis, diabetes, GI bleeding, with known AVMs. Past surgical history is significant for cardiac cath, permanent pacemaker, lumbar laminectomy, tonsillectomy, umbilical hernia repair, carpal tunnel, and multiple GI endoscopic procedures. Family history was reviewed and is negative for GI malignancies. No documented social history of any EtOH, or illicit drug use, noted for nicotine use. History Obtained From:  electronic medical record  GI consult for anemia-patient is currently intubated, all of her history was obtained from the electronic health record. She was admitted with shortness of breath, required intubation in the emergency department with progressive decline of blood pressure and HR to asystole, ACLS was immediatly initiated and continued for 16 minutes with return of  Spontaneous circulation, currently she is in the ICU intubated, non responsive, not on sedation, cooling protocol in place. GI was asked to evaluate anemia, Hgb in the ED was reported to be 5, she rec'd 1 unit of PRBC, Hgb now 9.2. No overt GIB noted. Has hx of extensive GI workup over the past year including EGD 2020 that showed multiple nonbleeding duodenal AVMs and antral erosions, 20 Push enteroscopy-normal, 20 Colonoscopy  notable for diverticulosis and sigmoid polyps, then [facility] [place] [facility] endoscopy notable for multiple scattered AVMs in the distal duodenum, proximal jejunum and ileum.      History:      Past Medical History:   Diagnosis Date    Anxiety     Asthma     dx 2019 / has smoked since age 11    CHF (congestive heart failure) (HCC)     Chronic back pain     Bilateral L5 S1 Radic on emg--surprisingly worse on the left than the right--pt's symptoms and her MRI show worse on the right    Chronic obstructive pulmonary disease with acute exacerbation ([facility] [address] 10/12/2019    Depression     Fibromyalgia     Hyperlipidemia     meds > 8 yrs    Hypertension     meds > 45 yrs    On home O2     2l per n/c at bedtime mostly,     Osteoarthritis     Type II diabetes mellitus, uncontrolled ([address]     hx > 8 yrs    Unspecified sleep apnea      Past Surgical History:   Procedure Laterality Date    BACK SURGERY  2017    lumbar disc    CARDIAC CATHETERIZATION  11/3/14     DR. MIRELES / no stents    COLONOSCOPY  08/29/2016    w/polypectomy     COLONOSCOPY N/A 9/29/2020    COLONOSCOPY WITH POLYPECTOMY [facility]iza [facility] at [facility] N[facility]A 10/7/2019    EUA HYSTEROSCOPY DILATATION AND CURETTAGE performed by Simba Phillips DO at [facility]. [address], COLON, DIAGNOSTIC      EYE SURGERY      [place] with IOL OU / [address] [place]  [address]    umbilical hernia repair    [place] ESOPHAGOGASTRODUODENOSCOPY TRANSORAL DIAGNOSTIC N/A 3/24/2017    EGD ESOPHAGOGASTRODUODENOSCOPY performed by Lashonda Edwards MD at [place] [address] 2/8/2018    negative findings    [place] REVISE MEDIAN N/CARPAL TUNNEL SURG Left 6/5/2017    LEFT  CARPAL TUNNEL RELEASE performed by Adrien Boateng MD at [facility]/V,XPYZ[facility] N/A 2/8/2018    TONSILLECTOMY      as child    UPPER GASTROINTESTINAL ENDOSCOPY  08/26/2016    w/bx     UPPER GASTROINTESTINAL ENDOSCOPY N/A 2/25/2020    EGD possible biopsy performed by Cielo Loaiza MD at [address] [place] [address] 7/1/2020    EGD PUSH ENTEROSCOPY performed by Crystal Cooper MD at [facility]     Family History  Family History   Problem Relation Age of Onset    Heart Disease Father         cardiac bypass    Arthritis Father     Arthritis Mother     Other Mother          at age 80    Other [facility]         [facility]    No Known Problems Daughter     Stroke Son      [] Unable to obtain due to ventilated and/ or neurologic status  Social History     Socioeconomic History    Marital status:      Spouse name: Savana Marcus Number of children: 2    Years of education: 15    Highest education level: High school graduate   Occupational History    Occupation: Retired-   Social Needs    Financial resource strain: Not hard at all   [place] insecurity     Worry: Never true     Inability: Never true    Transportation needs     Medical: No     Non-medical: No   Tobacco Use    Smoking status: Former Smoker     Packs/day: 1.00     Years: 59.00     Pack years: 59.00     Types: Cigarettes     Start date: 2017    Smokeless tobacco: Never Used    Tobacco comment: quit 2-3 weeks ago    Substance and Sexual Activity    Alcohol use: Not Currently    Drug use: No    Sexual activity: Yes     Partners: Male   Lifestyle    Physical activity     Days per week: 0 days     Minutes per session: 0 min    Stress:  Only a little   Relationships    Social connections     Talks on phone: More than three times a week     Gets together: More than three times a week     Attends [employer] service: 1 to 4 times per year     Active member of club or organization: No     Attends meetings of clubs or organizations: Never     Relationship status: Living with partner    Intimate partner violence     Fear of current or ex partner: No     Emotionally abused: No     Physically abused: No     Forced sexual activity: No   Other Topics Concern    Not on file   Social History Narrative    Grew up in [place]     Lives With:  [id number] Spouse    Type of Home: [address] [place] [address]: Two level, Performs ADL's on one level    Home Access: Stairs to enter with rails    Entrance Stairs - Number of Steps: 4    Bathroom Shower/Tub: Tub/Shower unit, Doors    Bathroom Equipment: Shower chair, Grab bars in shower    Home Equipment: Rolling walker, Cane, Oxygen(uses her O2 very seldom)    ADL Assistance: Needs assistance    Homemaking Assistance: Needs assistance    Homemaking Responsibilities: No(spouse performs)    Ambulation Assistance: Independent    Transfer Assistance: Independent    Active : No    Occupation: Retired    Type of occupation: worked in [employer]: patient enjoys [employer]      [] Unable to obtain due to ventilated and/ or neurologic status    Home Medications:      Medications Prior to Admission: insulin lispro (HUMALOG) 100 UNIT/ML injection vial, 18 units at each mels  insulin glargine (LANTUS) 100 UNIT/ML injection vial, 60 units at bedtime  verapamil (CALAN SR) 120 MG extended release tablet, Take 1 tablet by mouth daily  cloNIDine (CATAPRES) 0.2 MG tablet, Take 1 tablet by mouth 2 times daily  rosuvastatin (CRESTOR) 40 MG tablet, TAKE 1 TABLET BY MOUTH ONCE DAILY IN THE EVENING  torsemide (DEMADEX) 100 MG tablet, Take 0.5 tablets by mouth daily  PARoxetine (PAXIL) 40 MG tablet, TAKE 1 TABLET BY MOUTH ONCE DAILY IN THE MORNING  ACCU-CHEK PHYLLIS PLUS strip, Test 3x daily Dx E11.65  Accu-Chek Softclix Lancets MISC, bid  Blood Glucose Monitoring Suppl (ACCU-CHEK PHYLLIS CONNECT) w/Device KIT, 1 kit by Does not apply route daily  blood glucose test strips (ACCU-CHEK PHYLLIS PLUS) strip, Patient test 3x daily E65.11  ipratropium-albuterol (DUONEB) 0.5-2.5 (3) MG/3ML SOLN nebulizer solution, Inhale 3 mLs into the lungs 3 times daily  Continuous Blood Gluc Sensor (FREESTYLE MURALI 14 DAY SENSOR) [facility], Use every 2 weeks  Continuous Blood Gluc  (FREESTYLE MURALI 14 DAY READER) [place], As directed  traMADol (ULTRAM) 50 MG tablet, Take 50 mg by mouth every 6 hours as needed for Pain.  isosorbide mononitrate (IMDUR) 60 MG extended release tablet, Take 1 tablet by mouth daily  carvedilol (COREG) 25 MG tablet, Take 1 tablet by mouth 2 times daily  spironolactone (ALDACTONE) 25 MG tablet, Take 1 tablet by mouth daily  pantoprazole (PROTONIX) 40 MG tablet, Take 40mg twice daily (1st dose 30min before breakfast) for 30 days. After 30 days, you make take 40mg once daily before breakfast.  dicyclomine (BENTYL) 10 MG capsule, Take 1 capsule by mouth 4 times daily as needed (abdominal pain)  ondansetron (ZOFRAN) 4 MG tablet, Take 1 tablet by mouth every 8 hours as needed for Nausea or Vomiting    Current Hospital Medications:   Scheduled Meds:   insulin lispro  0-6 Units Subcutaneous 6 times per day    pantoprazole (PROTONIX) bolus  80 mg Intravenous Once    chlorhexidine  15 mL Mouth/Throat BID    sodium chloride flush  10 mL Intravenous 2 times per day    ipratropium-albuterol  1 ampule Inhalation 4x daily     Continuous Infusions:   sodium chloride      insulin 8.65 Units/hr (03/19/21 0819)    dextrose      pantoprozole (PROTONIX) infusion 8 mg/hr (03/19/21 0121)    lactated ringers Stopped (03/19/21 0410)     PRN Meds:.hydrALAZINE **OR** hydrALAZINE, glucose, dextrose, glucagon (rDNA), dextrose, labetalol, labetalol, promethazine **OR** ondansetron, sodium chloride flush, polyethylene glycol, acetaminophen **OR** acetaminophen   sodium chloride      insulin 8.65 Units/hr (03/19/21 0819)    dextrose      pantoprozole (PROTONIX) infusion 8 mg/hr (03/19/21 0121)    lactated ringers Stopped (03/19/21 0410)      Allergies:      Allergies   Allergen Reactions    Ibuprofen Nausea Only    Metformin And Related      Diarrhea      Darvon [Propoxyphene Hcl] Nausea And Vomiting      Review of Systems:       [] CV, Resp, Neuro, , and all other systems reviewed and negative other than listed in HPI.     Unable to obtain due to altered mental status    Objective Findings:     Vitals: Vitals:    03/19/21 0840 03/19/21 0841 03/19/21 0845 03/19/21 0900   BP:   (!) 76/42 (!) 82/45   Pulse: 54 54 54 54   Resp: 16 16 16 16   Temp:       SpO2: 100% 100% 100% 100%   Weight:       Height:            Physical Examination:  General: intubated, unrespponsive  HEENT: Normocephalic, no scleral icterus. Neck: No JVD. Heart: Regular, no murmur, no rub/gallop. No RV heave. Lungs: Clear to ascultation, no rales/wheezing/rhonchi. Good chest wall excursion. Abdomen: Appearance:, no Distension, Soft, Bowel sounds normal  Extremities: No clubbing/cyanosis, no edema. Skin: Warm, dry, normal turgor, no rash, no bruise, no petichiae. Neuro: non responsive      Results/ Medications reviewed 3/19/2021, 9:15 AM     Laboratory, Microbiology, Pathology, Radiology, Cardiology, Medications and Transcriptions reviewed  Scheduled Meds:   insulin lispro  0-6 Units Subcutaneous 6 times per day    pantoprazole (PROTONIX) bolus  80 mg Intravenous Once    chlorhexidine  15 mL Mouth/Throat BID    sodium chloride flush  10 mL Intravenous 2 times per day    ipratropium-albuterol  1 ampule Inhalation 4x daily     Continuous Infusions:   sodium chloride      insulin 8.65 Units/hr (03/19/21 0819)    dextrose      pantoprozole (PROTONIX) infusion 8 mg/hr (03/19/21 0121)    lactated ringers Stopped (03/19/21 0410)       Recent Labs     03/18/21  1615 03/18/21  1615 03/19/21  0051 03/19/21  0556 03/19/21  0611   WBC 7.0  --  19.9* 15.4*  --    HGB 5.8*   < > 10.5* 10.8* 10.2*   HCT 18.9*  --  32.9* 33.5*  --    .9*  --  94.5 93.9  --      --  291 292  --     < > = values in this interval not displayed.      Recent Labs     03/18/21  1615 03/18/21  1615 03/19/21  0045 03/19/21  0557 03/19/21  0611     --  138 143  --    K 5.2*  --  8.2* 4.7  --    *  --  106 106  --    CO2 13*  --  18* 21  --    PHOS  --   --  6.3* 4.6  --    BUN 29*  --  44* 42*  --    CREATININE 1.61*   < > 2.39* 2.14* 2.3*    < > = values in this interval not displayed. Recent Labs     03/18/21  1615 03/19/21  0557   AST 26 480*   ALT 20 331*   BILITOT 0.3 0.7   ALKPHOS 56 124     No results for input(s): LIPASE, AMYLASE in the last 72 hours. Recent Labs     03/18/21  1615 03/18/21  1630 03/19/21  0557 03/19/21  0600   PROT 3.3*  --  5.4*  --    INR  --  1.5  --  1.3     Xr Chest Portable    Result Date: 3/19/2021  EXAMINATION: XR CHEST PORTABLE CLINICAL HISTORY: INTUBATION COMPARISONS: OCTOBER 9, 2020 FINDINGS: Endotracheal tube has migrated 3 cm inferiorly, just lying cephalad to mojgan. Nasogastric tube courses beneath diaphragm. Osseous structures intact. Cardiopericardial silhouette normal. Aorta calcified. Ill-defined area increased opacity right  lung base. RIGHT LOWER LUNG SUBSEGMENTAL ATELECTASIS/PNEUMONIA. INTERVAL INFERIOR MIGRATION ENDOTRACHEAL TUBE 3 CM WITH TIP NOW JUST PROXIMAL TO MOJGAN. MAY WISH TO RETRACT ENDOTRACHEAL TUBE 2 CM. Xr Chest Portable    Result Date: 3/19/2021  EXAMINATION: XR CHEST PORTABLE CLINICAL HISTORY: RESPIRATORY FAILURE COMPARISONS: MARCH 16, 2021 FINDINGS: Endotracheal tube tip 2.3 cm superior to mojgan. Nasogastric tube courses beneath diaphragm area osseous structures intact. Ill-defined areas increase opacity at lung bases. Cardiopericardial silhouette normal.     BIBASILAR SUBSEGMENTAL ATELECTASIS/PNEUMONIA IN THIS PARTIALLY EXPIRATORY CHEST RADIOGRAPH. Impression:   79-year-old female admitted with respiratory failure, cardiac arrest, now intubated, unresponsive, cooling protocol in place, on arrival found to be anemic with a hemoglobin of 5, no overt bleeding, hemoglobin is 9.2 after packed red blood cells, known history of scattered AVMs throughout her GI tract including distal duodenum, proximal jejunum, and ileum. Plan:   1. Medical management per primary team  2. Serial H&H, trend and transfuse accordingly to keep hemoglobin greater than 7.5  3.   No immediate need for endoscopic investigation, ongoing evaluation pending clinical course  4. IV PPI  Comments: Thank you for allowing us to participate in the care of this patient. Will continue to follow.,  Starting today at 5 PM Dr. Ienssa Murillo is covering GI hospital call    Please call if questions or concerns arise. A/P  Agree regarding assessment and plan. Events noted. Patient presented with respiratory failure and cardiac arrest.  Noted  hemoglobin of 5 with known history of scattered AVMs throughout GI tract. Patient is currently intubated in the ICU cooling protocol in place post cardiac arrest.  Faby Vee MD  Please note this report has been partially produced using speech recognition software and may cause contain errors related to that system including grammar, punctuation and spelling as well as words and phrases that may seem inappropriate. If there are questions or concerns please feel free to contact me to clarify.

## 2021-03-19 NOTE — CONSULTS
[facility]. Nephrology  Consult Note           Reason for Consult:  Frank Clarke, ckd stage 4  Requesting Physician:  Dr. Kingsley Phillips    Chief Complaint:  Cardiac arrest  History Obtained From:  patient, electronic medical record    History of Present Ilness:    67y.o. year old female admitted with cardiac arrest.  We follow for ckd stage 3-4. Has risk factors of DM, HTN, CAD, CHF. Diastolic heart failure. Renal u/s / duplex with possible > 60% blockage on right. Clear on left. Had SAÚL with cardiac arrest in oct 2020 when cr went to 4. Came down to 2.9 and is actually better than that now. B/l cr before in upper 1's. Had anemia before and seen by GI. Now with hgb around 7. Had PEA arrest.  On hypothermia. Initially hypotensive. Off pressors. Critical care seeing patient. Urine outptut has picked up a bit. On vent. Past Medical History:        Diagnosis Date    Anxiety     Asthma     dx 2019 / has smoked since age 14    CHF (congestive heart failure) (HCC)     Chronic back pain     Bilateral L5 S1 Radic on emg--surprisingly worse on the left than the right--pt's symptoms and her MRI show worse on the right    Chronic obstructive pulmonary disease with acute exacerbation ([address] 10/12/2019    Depression     Fibromyalgia     Hyperlipidemia     meds > 8 yrs    Hypertension     meds > 45 yrs    On home O2     2l per n/c at bedtime mostly,     Osteoarthritis     Type II diabetes mellitus, uncontrolled ([address]     hx > 8 yrs    Unspecified sleep apnea        Past Surgical History:        Procedure Laterality Date    BACK SURGERY  2017    lumbar disc    CARDIAC CATHETERIZATION  11/3/14     DR. MIRELES / no stents    COLONOSCOPY  08/29/2016    w/polypectomy     COLONOSCOPY N/A 9/29/2020    COLONOSCOPY WITH POLYPECTOMY performed by Alfonso Coleman MD at [facility] N/A 10/7/2019    EUA HYSTEROSCOPY DILATATION AND CURETTAGE performed by Hafsa Alvarez DO at [address], COLON, DIAGNOSTIC      EYE SURGERY      [place] with IOL OU / [id number]    umbilical hernia repair    [place] ESOPHAGOGASTRODUODENOSCOPY TRANSORAL DIAGNOSTIC N/A 3/24/2017    EGD ESOPHAGOGASTRODUODENOSCOPY performed by Amy Calles MD at [facility] N/A 2/8/2018    negative findings    [place] REVISE MEDIAN N/CARPAL TUNNEL SURG Left 6/5/2017    LEFT  CARPAL TUNNEL RELEASE performed by Elida Braden MD at [facility],9+O/B,BXOCK N/A 2/8/2018    TONSILLECTOMY      as child    UPPER GASTROINTESTINAL ENDOSCOPY  08/26/2016    w/bx     UPPER GASTROINTESTINAL ENDOSCOPY N/A 2/25/2020    EGD possible biopsy performed by Corrine Pederson MD at [address] [place] [address] 7/1/2020    EGD PUSH ENTEROSCOPY performed by Caryle Kite, MD at [facility] Medications:    No current facility-administered medications on file prior to encounter.       Current Outpatient Medications on File Prior to Encounter   Medication Sig Dispense Refill    insulin lispro (HUMALOG) 100 UNIT/ML injection vial 18 units at each mels 1 vial 3    insulin glargine (LANTUS) 100 UNIT/ML injection vial 60 units at bedtime 3 vial 3    verapamil (CALAN SR) 120 MG extended release tablet Take 1 tablet by mouth daily 90 tablet 3    cloNIDine (CATAPRES) 0.2 MG tablet Take 1 tablet by mouth 2 times daily 180 tablet 3    rosuvastatin (CRESTOR) 40 MG tablet TAKE 1 TABLET BY MOUTH ONCE DAILY IN THE EVENING 90 tablet 0    torsemide (DEMADEX) 100 MG tablet Take 0.5 tablets by mouth daily 30 tablet 0    PARoxetine (PAXIL) 40 MG tablet TAKE 1 TABLET BY MOUTH ONCE DAILY IN THE MORNING 90 tablet 1    ACCU-CHEK PHYLLIS PLUS strip Test 3x daily Dx E11.65 100 each 3    Accu-Chek Softclix Lancets MISC bid 100 each 3    Blood Glucose Monitoring Suppl (ACCU-CHEK PHYLLIS CONNECT) w/Device KIT 1 kit by Does not apply route daily 1 kit 0    blood glucose test strips (ACCU-CHEK PHYLLIS PLUS) strip Patient test 3x daily E65.11 100 each 3    ipratropium-albuterol (DUONEB) 0.5-2.5 (3) MG/3ML SOLN nebulizer solution Inhale 3 mLs into the lungs 3 times daily 360 mL 0    Continuous Blood Gluc Sensor (FREESTYLE MURALI 14 DAY SENSOR) MISC Use every 2 weeks 2 each 1    Continuous Blood Gluc  (FREESTYLE MURALI 14 DAY READER) [place] As directed 1 Device 0    traMADol (ULTRAM) 50 MG tablet Take 50 mg by mouth every 6 hours as needed for Pain.  isosorbide mononitrate (IMDUR) 60 MG extended release tablet Take 1 tablet by mouth daily 30 tablet 3    carvedilol (COREG) 25 MG tablet Take 1 tablet by mouth 2 times daily 60 tablet 3    spironolactone (ALDACTONE) 25 MG tablet Take 1 tablet by mouth daily 30 tablet 3    pantoprazole (PROTONIX) 40 MG tablet Take 40mg twice daily (1st dose 30min before breakfast) for 30 days.  After 30 days, you make take 40mg once daily before breakfast. 60 tablet 3    dicyclomine (BENTYL) 10 MG capsule Take 1 capsule by mouth 4 times daily as needed (abdominal pain) 120 capsule 3    ondansetron (ZOFRAN) 4 MG tablet Take 1 tablet by mouth every 8 hours as needed for Nausea or Vomiting 30 tablet 2       Allergies:  Ibuprofen, Metformin and related, and Darvon [propoxyphene hcl]    Social History:    Social History     Socioeconomic History    Marital status:      Spouse name: Vassie Boas Number of children: 2    Years of education: 12    Highest education level: High school graduate   Occupational History    Occupation: Retired-   Social Needs    Financial resource strain: Not hard at all   [place] insecurity     Worry: Never true     Inability: Never true    Transportation needs     Medical: No     Non-medical: No   Tobacco Use    Smoking status: Former Smoker     Packs/day: 1.00     Years: 59.00     Pack years: 59.00     Types: Cigarettes     Start date: 11/30/2017   [place] Smokeless tobacco: Never Used    Tobacco comment: quit 2-3 weeks ago    Substance and Sexual Activity    Alcohol use: Not Currently    Drug use: No    Sexual activity: Yes     Partners: Male   Lifestyle    Physical activity     Days per week: 0 days     Minutes per session: 0 min    Stress:  Only a little   Relationships    Social connections     Talks on phone: More than three times a week     Gets together: More than three times a week     Attends Taoist service: 1 to 4 times per year     Active member of club or organization: No     Attends meetings of clubs or organizations: Never     Relationship status: Living with partner    Intimate partner violence     Fear of current or ex partner: No     Emotionally abused: No     Physically abused: No     Forced sexual activity: No   Other Topics Concern    Not on file   Social History Narrative    Grew up in [place]     Lives With:  [id number] Spouse    Type of Home: House    Home Layout: Two level, Performs ADL's on one level    Home Access: Stairs to enter with rails    Entrance Stairs - Number of Steps: 4    Bathroom Shower/Tub: Tub/Shower unit, Doors    Bathroom Equipment: Shower chair, Grab bars in [facility]: Rolling walker, Cane, Oxygen(uses her O2 very seldom)    ADL Assistance: Needs assistance    Homemaking Assistance: Needs assistance    Homemaking Responsibilities: No(spouse performs)    Ambulation Assistance: Independent    Transfer Assistance: Independent    Active : No    Occupation: Retired    Type of occupation: worked in [employer]: patient enjoys [employer]       Family History:   Family History   Problem Relation Age of Onset    Heart Disease Father         cardiac bypass    Arthritis Father     Arthritis Mother     Other Mother          at age 80    Other Sister         nuknown health hx    No Known Problems Daughter     Stroke Son        Review of Systems:   Review of Systems   Unable to perform ROS: Intubated         Physical exam:   Constitutional:  VITALS:  BP (!) 89/48   Pulse 62   Temp 97.1 °F (36.2 °C)   Resp 16   Ht 4' 11\" (1.499 m)   Wt 157 lb 3 oz (71.3 kg)   LMP  (LMP Unknown)   SpO2 100%   BMI 31.75 kg/m²   Gen: intubated, sedated on cooling protocol  Skin: no rash, turgor wnl  Heent:  eomi, mmm  Neck: no bruits or jvd noted, thyroid normal  Lungs:  Normal expansion. Clear to auscultation. No rales, rhonchi, or wheezing. Heart:  Heart sounds are normal.  Regular rate and rhythm without murmur, gallop or rub. Abdomen:  +bs, soft, nt, nd, no hepatosplenomegaly   Extremities: 1+ edema  Psychiatric:  Unable to assess  Musculoskeletal:   digits, nails normal    Data/  CBC:   Lab Results   Component Value Date    WBC 15.4 03/19/2021    RBC 3.57 03/19/2021    HGB 10.2 03/19/2021    HCT 33.5 03/19/2021    MCV 93.9 03/19/2021    MCH 30.1 03/19/2021    MCHC 32.1 03/19/2021    RDW 18.7 03/19/2021     03/19/2021    MPV 8.8 08/01/2015     BMP:    Lab Results   Component Value Date     03/19/2021    K 4.7 03/19/2021    K 4.4 10/19/2020     03/19/2021    CO2 21 03/19/2021    BUN 42 03/19/2021    LABALBU 3.3 03/19/2021    CREATININE 2.3 03/19/2021    CREATININE 2.14 03/19/2021    CALCIUM 9.6 03/19/2021    GFRAA 25 03/19/2021    LABGLOM 21 03/19/2021    GLUCOSE 307 03/19/2021     Xr Chest Portable    Result Date: 3/19/2021  EXAMINATION: XR CHEST PORTABLE CLINICAL HISTORY: INTUBATION COMPARISONS: OCTOBER 9, 2020 FINDINGS: Endotracheal tube has migrated 3 cm inferiorly, just lying cephalad to mojgan. Nasogastric tube courses beneath diaphragm. Osseous structures intact. Cardiopericardial silhouette normal. Aorta calcified. Ill-defined area increased opacity right  lung base. RIGHT LOWER LUNG SUBSEGMENTAL ATELECTASIS/PNEUMONIA. INTERVAL INFERIOR MIGRATION ENDOTRACHEAL TUBE 3 CM WITH TIP NOW JUST PROXIMAL TO MOGJAN. MAY WISH TO RETRACT ENDOTRACHEAL TUBE 2 CM.     Xr Chest Portable    Result Date: 3/19/2021  EXAMINATION: XR CHEST PORTABLE CLINICAL HISTORY: RESPIRATORY FAILURE COMPARISONS: MARCH 16, 2021 FINDINGS: Endotracheal tube tip 2.3 cm superior to mojgan. Nasogastric tube courses beneath diaphragm area osseous structures intact. Ill-defined areas increase opacity at lung bases. Cardiopericardial silhouette normal.     BIBASILAR SUBSEGMENTAL ATELECTASIS/PNEUMONIA IN THIS PARTIALLY EXPIRATORY CHEST RADIOGRAPH. Assessment/  78 yo lady with CKD stage 4. Has cardiac arrest and on hypothermia protocol.  k ok/  hco3 ok. Urine output increasing. bp meds on hold. Plan/  1- pt at risk for ATN after cardiac arrest  2- agree with LR at 125 cc/hr   3- increased uo is good sign  4. Hold bp meds for now    Thank you for the consultation. Please do not hesitate to call with questions.     Joann Tomlinson

## 2021-03-19 NOTE — ED NOTES
ICU nurses at bedside. Waiting on respiratory therapist to take patient to floor.       Heather Ontiveros RN  03/18/21 2003

## 2021-03-19 NOTE — PROCEDURES
Patient Name: Vivian Gomez   Medical Record Number: [id number]  Date: 3/19/2021   Time: 1:12 AM   Room/Bed: [place] [address]  Arterial Line Placement Procedure Note                   Indication: arterial blood gases    Consent: Unable to be obtained due to the emergent nature of this procedure. Paul's Test: Normal    Procedure: The skin over the right radial artery was prepped with betadine and draped in a sterile fashion. Local anesthesia was not performed due to the emergent nature of this procedure. An 18 gauge angiocath was then inserted, over a needle, into the vessel. The transducer set was then attached and securely fastened to the skin with sutures. Waveforms on the monitor were observed and found to be adequate. The patient had good distal perfusion after the procedure. The site was then dressed in a sterile fashion. The patient tolerated the procedure well.      Complications: None    Electronically Signed by: Puma Gilbert    Additionally, Pt is persistently hyperglycemic and hypertensive, starting hydralazine 10mg IV Q4H PRN SBP >170 and insulin infusion for persistent hyperglycemia goal glucose <180

## 2021-03-20 ENCOUNTER — APPOINTMENT (OUTPATIENT)
Dept: GENERAL RADIOLOGY | Age: 76
DRG: 296 | End: 2021-03-20
Payer: MEDICARE

## 2021-03-20 ENCOUNTER — APPOINTMENT (OUTPATIENT)
Dept: CT IMAGING | Age: 76
DRG: 296 | End: 2021-03-20
Payer: MEDICARE

## 2021-03-20 LAB
ALBUMIN SERPL-MCNC: 3 G/DL (ref 3.5–4.6)
ALP BLD-CCNC: 102 U/L (ref 40–130)
ALT SERPL-CCNC: 254 U/L (ref 0–33)
ANION GAP SERPL CALCULATED.3IONS-SCNC: 12 MEQ/L (ref 9–15)
ANION GAP SERPL CALCULATED.3IONS-SCNC: 8 MEQ/L (ref 9–15)
APTT: 37.3 SEC (ref 24.4–36.8)
AST SERPL-CCNC: 196 U/L (ref 0–35)
BASE EXCESS ARTERIAL: -2 (ref -3–3)
BASOPHILS ABSOLUTE: 0 K/UL (ref 0–0.2)
BASOPHILS RELATIVE PERCENT: 0.3 %
BILIRUB SERPL-MCNC: 0.4 MG/DL (ref 0.2–0.7)
BUN BLDV-MCNC: 37 MG/DL (ref 8–23)
BUN BLDV-MCNC: 40 MG/DL (ref 8–23)
CALCIUM IONIZED, CALC AT PH 7.4: 1.22 MMOL/L (ref 1.11–1.3)
CALCIUM IONIZED: 1.23 MMOL/L (ref 1.12–1.32)
CALCIUM IONIZED: 1.28 MMOL/L (ref 1.11–1.3)
CALCIUM SERPL-MCNC: 8.8 MG/DL (ref 8.5–9.9)
CALCIUM SERPL-MCNC: 8.9 MG/DL (ref 8.5–9.9)
CHLORIDE BLD-SCNC: 111 MEQ/L (ref 95–107)
CHLORIDE BLD-SCNC: 111 MEQ/L (ref 95–107)
CO2: 22 MEQ/L (ref 20–31)
CO2: 24 MEQ/L (ref 20–31)
CREAT SERPL-MCNC: 2.05 MG/DL (ref 0.5–0.9)
CREAT SERPL-MCNC: 2.07 MG/DL (ref 0.5–0.9)
EOSINOPHILS ABSOLUTE: 0.1 K/UL (ref 0–0.7)
EOSINOPHILS RELATIVE PERCENT: 0.7 %
GFR AFRICAN AMERICAN: 26
GFR AFRICAN AMERICAN: 28.1
GFR AFRICAN AMERICAN: 28.4
GFR NON-AFRICAN AMERICAN: 22
GFR NON-AFRICAN AMERICAN: 23.2
GFR NON-AFRICAN AMERICAN: 23.5
GLOBULIN: 2.2 G/DL (ref 2.3–3.5)
GLUCOSE BLD-MCNC: 107 MG/DL (ref 60–115)
GLUCOSE BLD-MCNC: 111 MG/DL (ref 60–115)
GLUCOSE BLD-MCNC: 135 MG/DL (ref 60–115)
GLUCOSE BLD-MCNC: 137 MG/DL (ref 60–115)
GLUCOSE BLD-MCNC: 140 MG/DL (ref 60–115)
GLUCOSE BLD-MCNC: 178 MG/DL (ref 60–115)
GLUCOSE BLD-MCNC: 61 MG/DL (ref 60–115)
GLUCOSE BLD-MCNC: 72 MG/DL (ref 70–99)
GLUCOSE BLD-MCNC: 74 MG/DL (ref 60–115)
GLUCOSE BLD-MCNC: 74 MG/DL (ref 70–99)
HCO3 ARTERIAL: 23.3 MMOL/L (ref 21–29)
HCT VFR BLD CALC: 30.2 % (ref 37–47)
HEMOGLOBIN: 10.5 GM/DL (ref 12–16)
HEMOGLOBIN: 9.8 G/DL (ref 12–16)
INR BLD: 1.2
LACTATE: 1.57 MMOL/L (ref 0.4–2)
LYMPHOCYTES ABSOLUTE: 0.4 K/UL (ref 1–4.8)
LYMPHOCYTES RELATIVE PERCENT: 3.2 %
MAGNESIUM: 2.8 MG/DL (ref 1.7–2.4)
MAGNESIUM: 3 MG/DL (ref 1.7–2.4)
MCH RBC QN AUTO: 30.4 PG (ref 27–31.3)
MCHC RBC AUTO-ENTMCNC: 32.4 % (ref 33–37)
MCV RBC AUTO: 93.8 FL (ref 82–100)
MONOCYTES ABSOLUTE: 0.5 K/UL (ref 0.2–0.8)
MONOCYTES RELATIVE PERCENT: 4.7 %
NEUTROPHILS ABSOLUTE: 10.5 K/UL (ref 1.4–6.5)
NEUTROPHILS RELATIVE PERCENT: 91.1 %
O2 SAT, ARTERIAL: 92 % (ref 93–100)
PCO2 ARTERIAL: 42 MM HG (ref 35–45)
PDW BLD-RTO: 19.1 % (ref 11.5–14.5)
PERFORMED ON: ABNORMAL
PERFORMED ON: NORMAL
PH ARTERIAL: 7.35 (ref 7.35–7.45)
PHOSPHORUS: 6 MG/DL (ref 2.3–4.8)
PHOSPHORUS: 6.4 MG/DL (ref 2.3–4.8)
PLATELET # BLD: 276 K/UL (ref 130–400)
PO2 ARTERIAL: 67 MM HG (ref 75–108)
POC CHLORIDE: 110 MEQ/L (ref 99–110)
POC CREATININE: 2.2 MG/DL (ref 0.6–1.2)
POC FIO2: 40
POC HEMATOCRIT: 31 % (ref 36–48)
POC POTASSIUM: 4.1 MEQ/L (ref 3.5–5.1)
POC SAMPLE TYPE: ABNORMAL
POC SODIUM: 142 MEQ/L (ref 136–145)
POTASSIUM SERPL-SCNC: 4.6 MEQ/L (ref 3.4–4.9)
POTASSIUM SERPL-SCNC: 4.6 MEQ/L (ref 3.4–4.9)
PROTHROMBIN TIME: 15.7 SEC (ref 12.3–14.9)
RBC # BLD: 3.22 M/UL (ref 4.2–5.4)
SODIUM BLD-SCNC: 143 MEQ/L (ref 135–144)
SODIUM BLD-SCNC: 145 MEQ/L (ref 135–144)
TCO2 ARTERIAL: 25 (ref 22–29)
TOTAL PROTEIN: 5.2 G/DL (ref 6.3–8)
WBC # BLD: 11.6 K/UL (ref 4.8–10.8)

## 2021-03-20 PROCEDURE — 82330 ASSAY OF CALCIUM: CPT

## 2021-03-20 PROCEDURE — 85730 THROMBOPLASTIN TIME PARTIAL: CPT

## 2021-03-20 PROCEDURE — 85025 COMPLETE CBC W/AUTO DIFF WBC: CPT

## 2021-03-20 PROCEDURE — 99233 SBSQ HOSP IP/OBS HIGH 50: CPT | Performed by: INTERNAL MEDICINE

## 2021-03-20 PROCEDURE — 84295 ASSAY OF SERUM SODIUM: CPT

## 2021-03-20 PROCEDURE — 82435 ASSAY OF BLOOD CHLORIDE: CPT

## 2021-03-20 PROCEDURE — 6370000000 HC RX 637 (ALT 250 FOR IP): Performed by: INTERNAL MEDICINE

## 2021-03-20 PROCEDURE — 80053 COMPREHEN METABOLIC PANEL: CPT

## 2021-03-20 PROCEDURE — 2500000003 HC RX 250 WO HCPCS: Performed by: INTERNAL MEDICINE

## 2021-03-20 PROCEDURE — 94003 VENT MGMT INPAT SUBQ DAY: CPT

## 2021-03-20 PROCEDURE — 85014 HEMATOCRIT: CPT

## 2021-03-20 PROCEDURE — 2580000003 HC RX 258: Performed by: INTERNAL MEDICINE

## 2021-03-20 PROCEDURE — 71045 X-RAY EXAM CHEST 1 VIEW: CPT

## 2021-03-20 PROCEDURE — 82803 BLOOD GASES ANY COMBINATION: CPT

## 2021-03-20 PROCEDURE — 84132 ASSAY OF SERUM POTASSIUM: CPT

## 2021-03-20 PROCEDURE — 2700000000 HC OXYGEN THERAPY PER DAY

## 2021-03-20 PROCEDURE — 70450 CT HEAD/BRAIN W/O DYE: CPT

## 2021-03-20 PROCEDURE — 2000000000 HC ICU R&B

## 2021-03-20 PROCEDURE — 94640 AIRWAY INHALATION TREATMENT: CPT

## 2021-03-20 PROCEDURE — 83605 ASSAY OF LACTIC ACID: CPT

## 2021-03-20 PROCEDURE — C9113 INJ PANTOPRAZOLE SODIUM, VIA: HCPCS | Performed by: INTERNAL MEDICINE

## 2021-03-20 PROCEDURE — 82565 ASSAY OF CREATININE: CPT

## 2021-03-20 PROCEDURE — 83735 ASSAY OF MAGNESIUM: CPT

## 2021-03-20 PROCEDURE — 94761 N-INVAS EAR/PLS OXIMETRY MLT: CPT

## 2021-03-20 PROCEDURE — 82948 REAGENT STRIP/BLOOD GLUCOSE: CPT

## 2021-03-20 PROCEDURE — 99291 CRITICAL CARE FIRST HOUR: CPT | Performed by: INTERNAL MEDICINE

## 2021-03-20 PROCEDURE — 99222 1ST HOSP IP/OBS MODERATE 55: CPT | Performed by: PSYCHIATRY & NEUROLOGY

## 2021-03-20 PROCEDURE — 31500 INSERT EMERGENCY AIRWAY: CPT

## 2021-03-20 PROCEDURE — 6360000002 HC RX W HCPCS: Performed by: INTERNAL MEDICINE

## 2021-03-20 PROCEDURE — 36415 COLL VENOUS BLD VENIPUNCTURE: CPT

## 2021-03-20 PROCEDURE — 2500000003 HC RX 250 WO HCPCS

## 2021-03-20 PROCEDURE — 84100 ASSAY OF PHOSPHORUS: CPT

## 2021-03-20 PROCEDURE — 85610 PROTHROMBIN TIME: CPT

## 2021-03-20 PROCEDURE — 36600 WITHDRAWAL OF ARTERIAL BLOOD: CPT

## 2021-03-20 RX ORDER — ETOMIDATE 2 MG/ML
INJECTION INTRAVENOUS
Status: DISPENSED
Start: 2021-03-20 | End: 2021-03-20

## 2021-03-20 RX ORDER — MORPHINE SULFATE 2 MG/ML
2 INJECTION, SOLUTION INTRAMUSCULAR; INTRAVENOUS
Status: DISCONTINUED | OUTPATIENT
Start: 2021-03-20 | End: 2021-03-24

## 2021-03-20 RX ADMIN — Medication 10 ML: at 21:42

## 2021-03-20 RX ADMIN — Medication 5 MCG/MIN: at 07:08

## 2021-03-20 RX ADMIN — IPRATROPIUM BROMIDE AND ALBUTEROL SULFATE 1 AMPULE: .5; 3 SOLUTION RESPIRATORY (INHALATION) at 16:01

## 2021-03-20 RX ADMIN — IPRATROPIUM BROMIDE AND ALBUTEROL SULFATE 1 AMPULE: .5; 3 SOLUTION RESPIRATORY (INHALATION) at 19:33

## 2021-03-20 RX ADMIN — Medication 16 MCG/MIN: at 07:03

## 2021-03-20 RX ADMIN — PROPOFOL 35 MCG/KG/MIN: 10 INJECTION, EMULSION INTRAVENOUS at 05:02

## 2021-03-20 RX ADMIN — INSULIN LISPRO 1 UNITS: 100 INJECTION, SOLUTION INTRAVENOUS; SUBCUTANEOUS at 23:31

## 2021-03-20 RX ADMIN — PROPOFOL 40 MCG/KG/MIN: 10 INJECTION, EMULSION INTRAVENOUS at 14:00

## 2021-03-20 RX ADMIN — DEXTROSE MONOHYDRATE 12.5 G: 25 INJECTION, SOLUTION INTRAVENOUS at 04:38

## 2021-03-20 RX ADMIN — IPRATROPIUM BROMIDE AND ALBUTEROL SULFATE 1 AMPULE: .5; 3 SOLUTION RESPIRATORY (INHALATION) at 04:43

## 2021-03-20 RX ADMIN — Medication 10 ML: at 08:06

## 2021-03-20 RX ADMIN — CHLORHEXIDINE GLUCONATE 15 ML: 1.2 RINSE ORAL at 21:42

## 2021-03-20 RX ADMIN — SODIUM CHLORIDE 8 MG/HR: 9 INJECTION, SOLUTION INTRAVENOUS at 04:19

## 2021-03-20 RX ADMIN — CHLORHEXIDINE GLUCONATE 15 ML: 1.2 RINSE ORAL at 08:08

## 2021-03-20 RX ADMIN — PROPOFOL 30 MCG/KG/MIN: 10 INJECTION, EMULSION INTRAVENOUS at 09:21

## 2021-03-20 RX ADMIN — IPRATROPIUM BROMIDE AND ALBUTEROL SULFATE 1 AMPULE: .5; 3 SOLUTION RESPIRATORY (INHALATION) at 10:32

## 2021-03-20 RX ADMIN — SODIUM CHLORIDE 8 MG/HR: 9 INJECTION, SOLUTION INTRAVENOUS at 14:00

## 2021-03-20 ASSESSMENT — PULMONARY FUNCTION TESTS
PIF_VALUE: 44
PIF_VALUE: 24
PIF_VALUE: 24
PIF_VALUE: 27
PIF_VALUE: 23
PIF_VALUE: 29
PIF_VALUE: 23
PIF_VALUE: 36
PIF_VALUE: 25
PIF_VALUE: 23
PIF_VALUE: 23
PIF_VALUE: 24
PIF_VALUE: 30
PIF_VALUE: 0
PIF_VALUE: 24
PIF_VALUE: 23
PIF_VALUE: 24
PIF_VALUE: 26
PIF_VALUE: 24
PIF_VALUE: 25
PIF_VALUE: 0
PIF_VALUE: 23
PIF_VALUE: 25
PIF_VALUE: 35
PIF_VALUE: 24
PIF_VALUE: 24
PIF_VALUE: 23
PIF_VALUE: 24
PIF_VALUE: 23
PIF_VALUE: 24
PIF_VALUE: 23
PIF_VALUE: 24
PIF_VALUE: 23
PIF_VALUE: 32
PIF_VALUE: 22
PIF_VALUE: 24
PIF_VALUE: 34
PIF_VALUE: 26
PIF_VALUE: 23
PIF_VALUE: 24
PIF_VALUE: 23
PIF_VALUE: 24
PIF_VALUE: 23
PIF_VALUE: 24
PIF_VALUE: 23
PIF_VALUE: 25
PIF_VALUE: 24
PIF_VALUE: 34
PIF_VALUE: 22
PIF_VALUE: 30
PIF_VALUE: 24
PIF_VALUE: 23
PIF_VALUE: 24
PIF_VALUE: 23
PIF_VALUE: 24
PIF_VALUE: 26
PIF_VALUE: 25
PIF_VALUE: 27
PIF_VALUE: 24
PIF_VALUE: 23
PIF_VALUE: 24
PIF_VALUE: 24
PIF_VALUE: 23
PIF_VALUE: 24
PIF_VALUE: 24
PIF_VALUE: 29
PIF_VALUE: 24
PIF_VALUE: 44
PIF_VALUE: 34
PIF_VALUE: 31
PIF_VALUE: 25
PIF_VALUE: 24
PIF_VALUE: 24
PIF_VALUE: 23
PIF_VALUE: 40

## 2021-03-20 ASSESSMENT — PAIN SCALES - GENERAL
PAINLEVEL_OUTOF10: 0

## 2021-03-20 NOTE — PROGRESS NOTES
Pulmonary & Critical Care Medicine ICU Progress Note    Subjective:     Overnight the patient reportedly had a cuff leak. The overnight hospitalist did attempt to change her existing endotracheal tube. The existing endotracheal tube was removed and the patient did have an attempted intubation, however this did result in esophageal intubation. The patient subsequently had desaturation and bradycardia. An endotracheal tube was able to be placed in the trachea and the patient had improvement of her oxygen saturation. Chest x-ray initially did demonstrate right mainstem intubation and this was retracted. In the process of intubation patient did have a tooth broken as well. This was reported to me by the hospitalist who is on last evening performing the procedure. Since the events she did require reinitiation of Levophed. Yesterday afternoon I was notified around 5 PM the patient was able to follow commands. I did instruct the bedside nursing staff to discontinue the cooling protocol, however it does appear that she continued on the cooling protocol and started the rewarming process around 7 PM.  She does continue in the rewarming phase at this time. She does currently have sedation in place.     EXAM:  General: No acute distress, sedated on the ventilator  HEENT: Normocephalic, atraumatic, oral ETT and OG in place, evidence of recent broken tooth on the upper right  Lungs : Diminished but clear, no wheezes, no rhonchi, no ventilator dyssynchrony  Heart: Regular rate and rhythm  ABD: Obese, positive bowel sounds, soft, nontender to palpation, no guarding or rigidity  Extremities : Warm, dry, mild edema noted  Neuro: Sedated on propofol, does intermittently follow commands when sedation is held briefly (sedation was reinitiated given that the patient is scheduled to have a repeat CT scan of her head this morning in a few minutes)  Skin: No rashes    MV Settings:  Vent Mode: AC/VC Rate Set: 16 bmp/Vt Ordered: 350 mL/ /FiO2 : 80 %     Recent Labs     03/19/21  1309 03/20/21  0530   PHART 7.391 7.354   [id number] 40 42   PO2ART 119* 67*         IV:   norepinephrine 5 mcg/min (03/20/21 0708)    dextrose      propofol 50 mcg/kg/min (03/20/21 0602)    pantoprozole (PROTONIX) infusion 8 mg/hr (03/20/21 0419)    lactated ringers Stopped (03/19/21 0410)       Vitals:  BP (!) 74/24   Pulse 86   Temp 97.1 °F (36.2 °C)   Resp 16   Ht 4' 11\" (1.499 m)   Wt 157 lb 3 oz (71.3 kg)   LMP  (LMP Unknown)   SpO2 100%   BMI 31.75 kg/m²          Intake/Output Summary (Last 24 hours) at 3/20/2021 0804  Last data filed at 3/20/2021 0600  Gross per 24 hour   Intake 176.13 ml   Output 1150 ml   Net -973.87 ml       Medications:  Scheduled Meds:   etomidate        etomidate        insulin lispro  0-6 Units Subcutaneous 6 times per day    pantoprazole (PROTONIX) bolus  80 mg Intravenous Once    chlorhexidine  15 mL Mouth/Throat BID    sodium chloride flush  10 mL Intravenous 2 times per day    ipratropium-albuterol  1 ampule Inhalation 4x daily       Labs:   CBC:   Recent Labs     03/19/21  0051 03/19/21  0556 03/19/21  0556 03/19/21  1309 03/20/21  0500 03/20/21  0530   WBC 19.9* 15.4*  --   --  11.6*  --    HGB 10.5* 10.8*   < > 9.2* 9.8* 10.5*   HCT 32.9* 33.5*  --   --  30.2*  --    MCV 94.5 93.9  --   --  93.8  --     292  --   --  276  --     < > = values in this interval not displayed. BMP:   Recent Labs     03/19/21  0950 03/19/21  0950 03/19/21  2137 03/20/21  0033 03/20/21  0500 03/20/21  0530   *  --   --  145* 143  --    K 4.7  --  4.6 4.6 4.6  --    *  --   --  111* 111*  --    CO2 23  --   --  22 24  --    PHOS 4.8  --   --  6.0* 6.4*  --    BUN 42*  --   --  37* 40*  --    CREATININE 2.50*   < >  --  2.05* 2.07* 2.2*    < > = values in this interval not displayed.      LIVER PROFILE:   Recent Labs     03/18/21  1615 03/19/21  0557 03/20/21  0500   AST 26 480* 196*   ALT 20 331* 254*   BILITOT 0.3 0.7 0.4   ALKPHOS 56 124 102     PT/INR:   Recent Labs     03/18/21  1845 03/19/21  0600 03/20/21  0500   PROTIME 16.4* 16.2* 15.7*   INR 1.3 1.3 1.2     APTT:   Recent Labs     03/19/21  0600 03/20/21  0500   APTT 31.9 37.3*     UA:No results for input(s): NITRITE, COLORU, PHUR, LABCAST, WBCUA, RBCUA, MUCUS, TRICHOMONAS, YEAST, BACTERIA, CLARITYU, SPECGRAV, LEUKOCYTESUR, UROBILINOGEN, BILIRUBINUR, BLOODU, GLUCOSEU, AMORPHOUS in the last 72 hours. Invalid input(s): KETONESU      Assessment/Plan:    1. Acute respiratory failure-patient did require exchange of her endotracheal tube last evening secondary to a report of a leak in her endotracheal tube cough. This did prove to be somewhat difficult as the patient did have a resultant initial esophageal intubation, right mainstem intubation, and ultimately a broken tooth. She has now been successfully reintubated. Her FiO2 was increased significantly during these events. This will be weaned back down today. Otherwise we will continue with current supportive care until her neurologic function can be better assessed. 2. Cardiac arrest with successful ROSC-she was initially started on the cooling protocol, however around 5 PM yesterday she was found to be following commands. I did instruct the bedside staff to discontinue the cooling protocol, however she remains on it and the rewarming phase at this time. This will be removed today. Neurology has been consulted by the hospitalist service. She will have a CT scan of her head performed this morning. Cardiology does continue to follow given the patient's cardiac arrest.  She does have a known history of underlying disease and does follow with them routinely as an outpatient. .  She does remain a DNR CCA at this time. 3. Shock-this was likely cardiogenic in nature. She did have her vasopressors weaned off, however they were reinitiated during the events this morning.   This reinitiation was likely secondary to medication effect as when I saw her propofol off she became hypertensive again and Levophed was able to be discontinued at that time. Continue to monitor her blood pressure. Antihypertensive medications will be reinitiated once she is less labile hemodynamically. 4. Acute kidney injury on chronic kidney disease-her creatinine has improved this morning. Continue to monitor her I's and O's. Nephrology does continue to follow. 5. Acute on chronic anemia-patient does have a component of chronic anemia. She was reportedly noted to have hemoglobin of 5 in the emergency department. Gastroenterology did consult and evaluate the patient. She does have a known history of small bowel AVMs that are not able to be treated given their location. Continue with management per gastroenterology. Transfuse as needed to keep hemoglobin above 7. She does remain on a Protonix drip at this time. 6. Diabetes mellitus -her insulin drip has since been discontinued. She currently is receiving sliding scale insulin. Nutrition: Tube feeds    Prophylaxis: SCDs for DVT prophylaxis given anemia upon presentation. Protonix drip in place and no additional stress ulcer prophylaxis is needed at this time. Code Status: DNR CCA    This is a 67 y.o. female who is critically ill secondary to cardiac arrest and respiratory failure. I have spent a total of 36 minutes of critical care time with this patient, review of the chart, and discussion with the bedside staff; excluding any billable procedures.     Electronically signed by Jennie Ellison DO, on 3/20/2021 at 8:04 AM

## 2021-03-20 NOTE — FLOWSHEET NOTE
0800- sedation vacation done patient seems to be following commands at times. Dr. Bach Due here to assess the patient. 0830- dr. Adrien Jiménez here to see the patient.     0900- took patient down for ct of the head

## 2021-03-20 NOTE — CONSULTS
Subjective:      Patient ID: Haley Banegas is a 67 y.o. female who presents today for a respiratory arrest    HPI 50-year-old right-handed female with shortness of breath and passing out at home. Patient resisted hospitalization initially and she was intubated in the emergency room. It does appear that she was gaining weight and may be gaining water. She is at this time intubated. She was very tired and short of breath and had a virtual visit with cardiology earlier. She is in diastolic heart failure. She had a cardiac arrest in October 2019. Patient is on propofol at this time the propofol was discontinued this morning and she was following commands as I am told. Review of Systems   Unable to perform ROS: Intubated       Past Medical History:   Diagnosis Date    Anxiety     Asthma     dx 2019 / has smoked since age 14    CHF (congestive heart failure) (HCC)     Chronic back pain     Bilateral L5 S1 Radic on emg--surprisingly worse on the left than the right--pt's symptoms and her MRI show worse on the right    Chronic obstructive pulmonary disease with acute exacerbation ([address] 10/12/2019    Depression     Fibromyalgia     Hyperlipidemia     meds > 8 yrs    Hypertension     meds > 45 yrs    On home O2     2l per n/c at bedtime mostly,     Osteoarthritis     Type II diabetes mellitus, uncontrolled ([address]     hx > 8 yrs    Unspecified sleep apnea      Past Surgical History:   Procedure Laterality Date    BACK SURGERY  2017    lumbar disc    CARDIAC CATHETERIZATION  11/3/14     DR. MIRELES / no stents    COLONOSCOPY  08/29/2016    w/polypectomy     COLONOSCOPY N/A 9/29/2020    COLONOSCOPY WITH POLYPECTOMY performed by [facility]ousif [facility] N/A 10/7/2019    EUA HYSTEROSCOPY DILATATION AND CURETTAGE performed by Sandra Mon DO at [facility]. [address], COLON, DIAGNOSTIC      EYE SURGERY      [place] with IOL OU / [place] organization: No     Attends meetings of clubs or organizations: Never     Relationship status: Living with partner    Intimate partner violence     Fear of current or ex partner: No     Emotionally abused: No     Physically abused: No     Forced sexual activity: No   Other Topics Concern    Not on file   Social History Narrative    Grew up in [place]     Lives With:  Omar Alfonso Spouse    Type of Home: House    Home Layout: Two level, Performs ADL's on one level    Home Access: Stairs to enter with rails    Entrance Stairs - Number of Steps: 4    Bathroom Shower/Tub: Tub/Shower unit, Doors    Bathroom Equipment: Shower chair, Grab bars in [facility]: Rolling walker, Cane, Oxygen(uses her O2 very seldom)    ADL Assistance: Needs assistance    Homemaking Assistance: Needs assistance    Homemaking Responsibilities: No(spouse performs)    Ambulation Assistance: Independent    Transfer Assistance: Independent    Active : No    Occupation: Retired    Type of occupation: worked in [employer]: patient enjoys [employer]     Family History   Problem Relation Age of Onset    Heart Disease Father         cardiac bypass    Arthritis Father     Arthritis Mother     Other Mother          at age 80    Other Sister         [facility]    No Known Problems Daughter     Stroke Son      Allergies   Allergen Reactions    Ibuprofen Nausea Only    Metformin And Related      Diarrhea      Darvon [Propoxyphene Hcl] Nausea And Vomiting     Current Facility-Administered Medications   Medication Dose Route Frequency Provider Last Rate Last Admin    etomidate (AMIDATE) 2 MG/ML injection             etomidate (AMIDATE) 2 MG/ML injection             norepinephrine (LEVOPHED) 16 mg in dextrose 5% 250 mL infusion  5 mcg/min Intravenous Continuous Bernardo Gilbert DO   Stopped at 21 0805    insulin lispro (HUMALOG) injection vial 0-6 Units  0-6 Units Subcutaneous 6 times per day Regla Robert Point, DO   5 Units at 03/19/21 0354    hydrALAZINE (APRESOLINE) injection 10 mg  10 mg Intravenous Q4H PRN Didier Casas Sedar, DO   10 mg at 03/19/21 5214    Or    hydrALAZINE (APRESOLINE) injection 20 mg  20 mg Intravenous Q4H PRN Didier Casas Sedar, DO        glucose (GLUTOSE) 40 % oral gel 15 g  15 g Oral PRN Didier Casas Sedar, DO        dextrose 50 % IV solution  12.5 g Intravenous PRN Didier Casas Sedar, DO   12.5 g at 03/20/21 0438    glucagon (rDNA) injection 1 mg  1 mg Intramuscular PRN Didier Casas Sedar, DO        dextrose 5 % solution  100 mL/hr Intravenous PRN Didier Casas Sedar, DO        labetalol (NORMODYNE;TRANDATE) injection 20 mg  20 mg Intravenous Q4H PRN Yandel Marie, DO   20 mg at 03/19/21 1701    labetalol (NORMODYNE;TRANDATE) injection 40 mg  40 mg Intravenous Q4H PRN Yandel Marie, DO   40 mg at 03/19/21 0827    propofol injection  5-50 mcg/kg/min Intravenous Titrated Yandel Marie, DO 12.8 mL/hr at 03/20/21 0807 30 mcg/kg/min at 03/20/21 0807    pantoprazole (PROTONIX) 80 mg in sodium chloride 0.9 % 50 mL bolus  80 mg Intravenous Once Desiree Johnson MD   Stopped at 03/18/21 1826    pantoprazole (PROTONIX) 80 mg in sodium chloride 0.9 % 100 mL infusion  8 mg/hr Intravenous Continuous Desiree Johnson MD 10 mL/hr at 03/20/21 0419 8 mg/hr at 03/20/21 0419    promethazine (PHENERGAN) tablet 12.5 mg  12.5 mg Oral Q6H PRN Desiree Johnson MD        Or    ondansetron (ZOFRAN) injection 4 mg  4 mg Intravenous Q6H PRN Desiree Johnson MD        chlorhexidine (PERIDEX) 0.12 % solution 15 mL  15 mL Mouth/Throat BID Desiree Johnson MD   15 mL at 03/20/21 0808    sodium chloride flush 0.9 % injection 10 mL  10 mL Intravenous 2 times per day Desiree Johnson MD   10 mL at 03/20/21 0806    sodium chloride flush 0.9 % injection 10 mL  10 mL Intravenous PRN Desiree Johnson MD        polyethylene glycol (GLYCOLAX) packet 17 g  17 g Oral Daily PRN Desiree Johnson MD        acetaminophen (TYLENOL) tablet 650 mg  650 mg Oral Q6H PRN Caio Agarwal MD        Or    acetaminophen (TYLENOL) suppository 650 mg  650 mg Rectal Q6H PRN Caio Agarwal MD        lactated ringers infusion   Intravenous Continuous Caio Agarwal MD   Stopped at 03/19/21 0410    ipratropium-albuterol (DUONEB) nebulizer solution 1 ampule  1 ampule Inhalation 4x daily Carmen Smith MD   1 ampule at 03/20/21 0443        Objective:   /65   Pulse 93   Temp 98.1 °F (36.7 °C) (Bladder)   Resp 18   Ht 4' 11\" (1.499 m)   Wt 157 lb 3 oz (71.3 kg)   LMP  (LMP Unknown)   SpO2 100%   BMI 31.75 kg/m²     Physical Exam  Vitals signs reviewed. Constitutional:       Appearance: She is obese. She is ill-appearing. Eyes:      Pupils: Pupils are equal, round, and reactive to light. Neck:      Musculoskeletal: Normal range of motion. Cardiovascular:      Rate and Rhythm: Normal rate and regular rhythm. Heart sounds: No murmur. Pulmonary:      Effort: Pulmonary effort is normal.      Breath sounds: Normal breath sounds. Abdominal:      General: Bowel sounds are normal.   Musculoskeletal: Normal range of motion. Skin:     General: Skin is warm. Neurological:      Mental Status: She is lethargic. Cranial Nerves: No cranial nerve deficit. Sensory: No sensory deficit. Motor: Weakness present. No abnormal muscle tone. Coordination: Coordination normal.      Deep Tendon Reflexes: Reflexes are normal and symmetric. Babinski sign absent on the right side. Babinski sign absent on the left side. Comments: In addition to the above examination patient is quite drowsy and lethargic due to being on propofol but is able to follow some basic commands when discontinued from propofol. Patient is completely areflexic in the lower extremities. And gait cannot be tested.    Psychiatric:         Mood and Affect: Mood normal.     Patient was following commands when propofol is discontinued according to the nurse and this was the exam    Echocardiogram Complete 2d With Doppler With Color    Result Date: 3/19/2021  Transthoracic Echocardiography Report (TTE)  Demographics  Patient Name   Bridgette Sanches  Gender              Female                 M  Patient Number [id number]        Race                Black                                 Ethnicity  Visit Number   [id number]       Room Number         IC05  Corporate ID                   Date of Study       03/19/2021  Accession      [id number]      Referring Physician  Number  Date of Birth  1945      Sonographer         R[place] Heribertokatina Marks,                                                     MICHELLE  Age            67 year(s)      [place] [address] Cardiology                                 Physician           Tish Mauricio MD Procedure Type of Study  TTE procedure:ECHO COMPLETE 2D W/DOP W/COLOR. Procedure Date Date: 03/19/2021 Start: 09:15 AM Study Location: Portable Technical Quality: Good visualization Indications:Cardiac arrest. Patient Status: Routine Weight: 143 pounds BP: 89/48 mmHg Allergies   - Other allergy:(Darvon). Conclusions  Summary  Mitral annular calcification is present. 1-2+ MR  Normal tricuspid valve structure and function. 1+ TR  RVSP 31 mmHg  severe CLVH  LVEF 50%  E/A flow reversal noted. Suggestive of diastolic dysfunction. Signature  ----------------------------------------------------------------  Electronically signed by Tish Mauricio MD(Interpreting  physician) on 03/19/2021 02:13 PM  ----------------------------------------------------------------  Findings Left Ventricle severe CLV[facility] LVEF 50% E/A flow reversal noted. Suggestive of diastolic dysfunction. Right Ventricle Normal right ventricle structure and function. Normal right ventricle systolic pressure. Left Atrium Moderately dilated left atrium. Right Atrium Normal right atrium. Mitral Valve Mitral annular calcification is present. 1-2+ MR Tricuspid Valve Normal tricuspid valve structure and function.  1+ TR RVSP 31 mmHg Aortic Valve Normal aortic valve structure and function. Pulmonic Valve The pulmonic valve was not well visualized . Pericardial Effusion No evidence of pericardial effusion. Pleural Effusion No evidence of pleural effusion. Aorta \ Miscellaneous The aorta is within normal limits. M-Mode Measurements (cm)  LVIDd: 4.01 cm                         LVIDs: 1.85 cm  IVSd: 1.45 cm                          IVSs: 1.23 cm  LVPWd: 1.32 cm                         LVPWs: 1.61 cm  Rt. Vent.  Dimension: 1.07 cm           AO Root Dimension: 2.8 cm                                         ACS: 1.47 cm                                         LA: 3.04 cm                                         LVOT: 1.98 cm Doppler Measurements:  AV Velocity:0.01 m/s                   MV Peak E-Wave: 0.96 m/s  AV Peak Gradient: 7.73 mmHg            MV Peak A-Wave: 1.58 m/s  AV Mean Gradient: 3.01 mmHg  AV Area (Continuity):1.27 cm^2         MV P1/2t: 105.6 msec  TR Velocity:2.29 m/s                   MVA by PHT2.08 cm^2  TR Gradient:21.01 mmHg                 Estimated RAP:10 mmHg                                         RVSP:31 mmHg Valves  Mitral Valve  Peak E-Wave: 0.96 m/s          Peak A-Wave: 1.58 m/s  P1/2t: 105.6 msec              E/A Ratio: 0.61                                 Peak Gradient: 3.7 mmHg  Area (PHT): 2.08 cm^2          Deceleration Time: 349.6 msec  MR Velocity: 4.66 m/s  Aortic Valve  Peak Velocity: 1.39 m/s                Mean Velocity: 0.79 m/s  Peak Gradient: 7.73 mmHg               Mean Gradient: 3.01 mmHg  Area (continuity): 1.27 cm^2  AV VTI: 28.28 cm  Cusp Separation: 1.47 cm  Tricuspid Valve  Estimated RVSP: 31 mmHg              Estimated RAP: 10 mmHg  TR Velocity: 2.29 m/s                TR Gradient: 21.01 mmHg  Pulmonic Valve  Peak Velocity: 0.68 m/s           Peak Gradient: 1.86 mmHg                                    Estimated PASP: 31.01 mmHg  LVOT  Peak Velocity: 0.72 m/s              Mean Velocity: 0.41 m/s Peak Gradient: 2 mmHg                Mean Gradient: 0.9 mmHg  LVOT Diameter: 1.98 cm               LVOT VTI: 11.64 cm Structures  Left Atrium  LA Dimension: 3.04 cm                 LA Area: 18.08 cm^2  LA/Aorta: 1.09  LA Volume/Index: 49.97 ml  Left Ventricle  Diastolic Dimension: 7.77 cm         Systolic Dimension: 9.55 cm  Septum Diastolic: 0.91 cm            Septum Systolic: 5.85 cm  PW Diastolic: 3.93 cm                PW Systolic: 5.50 cm                                       FS: 53.9 %  LV EDV/LV EDV Index: 70.29 ml        LV ESV/LV ESV Index: 10.47 ml  EF Calculated: 85.1 %  LVOT Diameter: 1.98 cm  Right Ventricle  Diastolic Dimension: 5.68 cm                                    RV Systolic Pressure: 78.16 mmHg Aorta/ Miscellaneous Aorta  Aortic Root: 2.8 cm  LVOT Diameter: 1.98 cm    Xr Chest Portable    Result Date: 3/19/2021  EXAMINATION: XR CHEST PORTABLE CLINICAL HISTORY: INTUBATION COMPARISONS: OCTOBER 9, 2020 FINDINGS: Endotracheal tube has migrated 3 cm inferiorly, just lying cephalad to mojgan. Nasogastric tube courses beneath diaphragm. Osseous structures intact. Cardiopericardial silhouette normal. Aorta calcified. Ill-defined area increased opacity right  lung base. RIGHT LOWER LUNG SUBSEGMENTAL ATELECTASIS/PNEUMONIA. INTERVAL INFERIOR MIGRATION ENDOTRACHEAL TUBE 3 CM WITH TIP NOW JUST PROXIMAL TO MOJGAN. MAY WISH TO RETRACT ENDOTRACHEAL TUBE 2 CM. Xr Chest Portable    Result Date: 3/19/2021  EXAMINATION: XR CHEST PORTABLE CLINICAL HISTORY: RESPIRATORY FAILURE COMPARISONS: MARCH 16, 2021 FINDINGS: Endotracheal tube tip 2.3 cm superior to mojgan. Nasogastric tube courses beneath diaphragm area osseous structures intact. Ill-defined areas increase opacity at lung bases. Cardiopericardial silhouette normal.     BIBASILAR SUBSEGMENTAL ATELECTASIS/PNEUMONIA IN THIS PARTIALLY EXPIRATORY CHEST RADIOGRAPH.       Lab Results   Component Value Date    WBC 11.6 03/20/2021    RBC 3.22 03/20/2021    HGB 10.5 03/20/2021    HCT 30.2 03/20/2021    MCV 93.8 03/20/2021    MCH 30.4 03/20/2021    MCHC 32.4 03/20/2021    RDW 19.1 03/20/2021     03/20/2021    MPV 8.8 08/01/2015     Lab Results   Component Value Date     03/20/2021    K 4.6 03/20/2021    K 4.4 10/19/2020     03/20/2021    CO2 24 03/20/2021    BUN 40 03/20/2021    CREATININE 2.2 03/20/2021    CREATININE 2.07 03/20/2021    GFRAA 26 03/20/2021    LABGLOM 22 03/20/2021    GLUCOSE 72 03/20/2021    PROT 5.2 03/20/2021    LABALBU 3.0 03/20/2021    CALCIUM 8.9 03/20/2021    BILITOT 0.4 03/20/2021    ALKPHOS 102 03/20/2021     03/20/2021     03/20/2021     Lab Results   Component Value Date    PROTIME 15.7 03/20/2021    INR 1.2 03/20/2021     Lab Results   Component Value Date    TSH 0.474 11/30/2020    RIQYLMPF68 1255 11/30/2020    FOLATE 11.1 11/30/2020    FERRITIN 27.0 07/03/2020    IRON 47 10/17/2020    TIBC 278 10/17/2020     Lab Results   Component Value Date    TRIG 64 11/30/2020    HDL 61 11/30/2020    LDLCALC 69 11/30/2020     Lab Results   Component Value Date    LABAMPH Neg 07/07/2019    BARBSCNU Neg 07/07/2019    LABBENZ Neg 07/07/2019    OPIATESCREENURINE Neg 07/07/2019    PHENCYCLIDINESCREENURINE Neg 07/07/2019    ETOH <10 03/18/2021     No results found for: LITHIUM, DILFRTOT, VALPROATE    Assessment:   Hypoxic ischemic encephalopathy in the face of congestive heart failure and diastolic failure. Patient at this time still has a nonfocal examination is uncontrolled as propofol was discontinued this morning and patient was following commands. Patient is quite lethargic but is able to get through some very basic commands. CT scan has not been done and is pending and will be done this morning. Patient has other neurological issues including back pain which was evaluated recently.   Continue to keep an eye on her examination to see if she has sustained any hypoxemic damage  As discussed to the emergency room    Critical care time of 80 minutes    Mehul Evans MD, Jacquie Nunes, American Board of Psychiatry & Neurology  Board Certified in Vascular Neurology  Board Certified in Neuromuscular Medicine  Certified in [facility]:

## 2021-03-20 NOTE — PROGRESS NOTES
Chief Complaint   Patient presents with    Respiratory Distress       SUBJECTIVE: On vent. Sedated.  present at the bedside. Hemodynamically stable on Levophed low-dose. Normal sinus rhythm on telemetry heart rate of 88 bpm.  Blood pressure 94/52.     Past Medical History:   Diagnosis Date    Anxiety     Asthma     dx 2019 / has smoked since age 14    CHF (congestive heart failure) ([facility])     Chronic back pain     Bilateral L5 S1 Radic on emg--surprisingly worse on the left than the right--pt's symptoms and her MRI show worse on the right    Chronic obstructive pulmonary disease with acute exacerbation ([address] 10/12/2019    Depression     Fibromyalgia     Hyperlipidemia     meds > 8 yrs    Hypertension     meds > 45 yrs    On home O2     2l per n/c at bedtime mostly,     Osteoarthritis     Type II diabetes mellitus, uncontrolled ([facility])     hx > 8 yrs    Unspecified sleep apnea      Patient Active Problem List   Diagnosis    Hypertension    Hyperlipidemia    Uncontrolled type 2 diabetes mellitus with complication, without long-term current use of insulin ([facility])    Fibromyalgia    Anxiety    Smoker    Insomnia    DJD (degenerative joint disease), lumbar    Lumbosacral radiculopathy at S1    DDD (degenerative disc disease), lumbar    Dysphonia    CTS (carpal tunnel syndrome)    High risk medication use-Norco - 12/20/17 [facility] PM&R, 02/20/18 [facility] PM&R, 03/07/18 [facility] PM&R, Urine Drug screen negative 02/06/17 PM&R--MED CONTRACT 2/6/17    SOB (shortness of breath) on exertion    Chest pain    Memory deficit    Artificial lens present    Presbyopia    Astigmatism, regular    Cataract, nuclear sclerotic senile    IDDM (insulin dependent diabetes mellitus)    Regular astigmatism    Nuclear senile cataract    Neck pain    Lumbosacral radiculopathy at L5    Spinal stenosis of lumbar region with neurogenic claudication    Impaired mobility and activities of daily living   Lion Sepulveda Non-compliant patient NO showed FU 1/10/17 Dr Marcia Jose Insomnia secondary to chronic pain    Reactive depression    Diabetic radiculopathy (HCC)    Cervical radicular pain    Diabetic asymmetric polyneuropathy (HCC)    Myalgia    Intercostal neuropathy    Osteoarthritis of spine with radiculopathy, lumbar region    Acute combined systolic and diastolic CHF, NYHA class 1 (HCC)    PMB (postmenopausal bleeding)    Acute on chronic diastolic heart failure (HCC)    CHF (congestive heart failure) (HCC)    Hypertensive urgency    Uncontrolled type 2 diabetes mellitus with hyperglycemia (HCC)    Chronic obstructive pulmonary disease with acute exacerbation (HCC)    Acute on chronic diastolic (congestive) heart failure ([facility] [address]    SAÚL (acute kidney injury) ([facility] [address]    Hypoglycemia    HOCM (hypertrophic obstructive cardiomyopathy) (HCC)    Gastrointestinal hemorrhage    COPD exacerbation (HCC)    Anemia    AVM (arteriovenous malformation)    Symptomatic anemia    Flash pulmonary edema (HCC)    Acute on chronic kidney failure (HCC)    Dysarthria    Chronic fatigue    Acute encephalopathy    Adenomatous polyp of sigmoid colon    Adenomatous polyp of transverse colon    Sepsis (HCC)    Major depressive disorder in remission ([facility] [address]    Gastroesophageal reflux disease without esophagitis    Acute cystitis without hematuria    CKD (chronic kidney disease) stage 4, GFR 15-29 ml/min (HCC)    Cardiopulmonary arrest (HCC)    Gait abnormality    Late effects of CVA (cerebrovascular accident)    Cardiac arrest (formerly [facility])       Current Facility-Administered Medications   Medication Dose Route Frequency Provider Last Rate Last Admin    etomidate (AMIDATE) 2 MG/ML injection             etomidate (AMIDATE) 2 MG/ML injection             norepinephrine (LEVOPHED) 16 mg in dextrose 5% 250 mL infusion  5 mcg/min Intravenous Continuous Beni LOC Sedar, DO 1.9 mL/hr at 03/20/21 0941 2 mcg/min at 03/20/21 0941    morphine (PF) injection 2 mg  2 mg Intravenous Q2H PRN Toshia Gardner, DO        insulin lispro (HUMALOG) injection vial 0-6 Units  0-6 Units Subcutaneous 6 times per day Toshia Gardner DO   5 Units at 03/19/21 0354    hydrALAZINE (APRESOLINE) injection 10 mg  10 mg Intravenous Q4H PRN Анна Hard Sedar, DO   10 mg at 03/19/21 0606    Or    hydrALAZINE (APRESOLINE) injection 20 mg  20 mg Intravenous Q4H PRN Анна Hard Sedar, DO        glucose (GLUTOSE) 40 % oral gel 15 g  15 g Oral PRN Анна Hard Sedar, DO        dextrose 50 % IV solution  12.5 g Intravenous PRN Анна Hard Sedar, DO   12.5 g at 03/20/21 0438    glucagon (rDNA) injection 1 mg  1 mg Intramuscular PRN Анна Hard Sedar, DO        dextrose 5 % solution  100 mL/hr Intravenous PRN Анна Hard Sedar, DO        labetalol (NORMODYNE;TRANDATE) injection 20 mg  20 mg Intravenous Q4H PRN Toshia Gardner, DO   20 mg at 03/19/21 1701    labetalol (NORMODYNE;TRANDATE) injection 40 mg  40 mg Intravenous Q4H PRN Toshia Gardner, DO   40 mg at 03/19/21 0827    propofol injection  5-50 mcg/kg/min Intravenous Titrated Regla Florence, DO 17.1 mL/hr at 03/20/21 0942 40 mcg/kg/min at 03/20/21 0942    pantoprazole (PROTONIX) 80 mg in sodium chloride 0.9 % 50 mL bolus  80 mg Intravenous Once Farooq Munson MD   Stopped at 03/18/21 1826    pantoprazole (PROTONIX) 80 mg in sodium chloride 0.9 % 100 mL infusion  8 mg/hr Intravenous Continuous Farooq Munson MD 10 mL/hr at 03/20/21 0419 8 mg/hr at 03/20/21 0419    promethazine (PHENERGAN) tablet 12.5 mg  12.5 mg Oral Q6H PRN Farooq Munson MD        Or    ondansetron (ZOFRAN) injection 4 mg  4 mg Intravenous Q6H PRN Farooq Munson MD        chlorhexidine (PERIDEX) 0.12 % solution 15 mL  15 mL Mouth/Throat BID Farooq Munson MD   15 mL at 03/20/21 0808    sodium chloride flush 0.9 % injection 10 mL  10 mL Intravenous 2 times per day Farooq Munson MD   10 mL at 03/20/21 0806    sodium chloride flush 0.9 % injection 10 mL  10 mL - 5.1 mEq/L    POC Chloride 112 (H) 99 - 110 mEq/L    POC Glucose 76 60 - 115 mg/dl    POC Creatinine 2.2 (H) 0.6 - 1.2 mg/dL    GFR Non-African American 22 (A) >60    GFR  26 (A) >60    Calcium, Ion 1.31 1.12 - 1.32 mmol/L    pH, Arterial 7.391 7.350 - 7.450    pCO2, Arterial 40 35 - 45 mm Hg    pO2, Arterial 119 (HH) 75 - 108 mm Hg    HCO3, Arterial 24.1 21.0 - 29.0 mmol/L    Base Excess, Arterial -1 -3 - 3    O2 Sat, Arterial 99 (HH) 93 - 100 %    TCO2, Arterial 25 22 - 29    Lactate 1.75 0.40 - 2.00 mmol/L    POC Hematocrit 27 (L) 36 - 48 %    Hemoglobin 9.2 (L) 12.0 - 16.0 gm/dL    FIO2 40.000     Sample Type ART     Performed on SEE BELOW    POCT Glucose    Collection Time: 03/19/21  2:35 PM   Result Value Ref Range    POC Glucose 53 (L) 60 - 115 mg/dl    Performed on ACCU-CHEK    POCT Glucose    Collection Time: 03/19/21  3:46 PM   Result Value Ref Range    POC Glucose 119 (H) 60 - 115 mg/dl    Performed on ACCU-CHEK    POCT Glucose    Collection Time: 03/19/21  4:45 PM   Result Value Ref Range    POC Glucose 85 60 - 115 mg/dl    Performed on ACCU-CHEK    Lactic Acid, Plasma    Collection Time: 03/19/21  6:38 PM   Result Value Ref Range    Lactic Acid 2.0 0.5 - 2.2 mmol/L   POCT Glucose    Collection Time: 03/19/21  8:58 PM   Result Value Ref Range    POC Glucose 77 60 - 115 mg/dl    Performed on ACCU-CHEK    Potassium    Collection Time: 03/19/21  9:37 PM   Result Value Ref Range    Potassium 4.6 3.4 - 4.9 mEq/L   POCT Glucose    Collection Time: 03/20/21 12:28 AM   Result Value Ref Range    POC Glucose 74 60 - 115 mg/dl    Performed on ACCU-CHEK    Basic Metabolic Panel    Collection Time: 03/20/21 12:33 AM   Result Value Ref Range    Sodium 145 (H) 135 - 144 mEq/L    Potassium 4.6 3.4 - 4.9 mEq/L    Chloride 111 (H) 95 - 107 mEq/L    CO2 22 20 - 31 mEq/L    Anion Gap 12 9 - 15 mEq/L    Glucose 74 70 - 99 mg/dL    BUN 37 (H) 8 - 23 mg/dL    CREATININE 2.05 (H) 0.50 - 0.90 mg/dL    GFR Non- 23.5 (L) >60    GFR  28.4 (L) >60    Calcium 8.8 8.5 - 9.9 mg/dL   Magnesium    Collection Time: 03/20/21 12:33 AM   Result Value Ref Range    Magnesium 3.0 (H) 1.7 - 2.4 mg/dL   Phosphorus    Collection Time: 03/20/21 12:33 AM   Result Value Ref Range    Phosphorus 6.0 (H) 2.3 - 4.8 mg/dL   POCT Glucose    Collection Time: 03/20/21  4:29 AM   Result Value Ref Range    POC Glucose 61 60 - 115 mg/dl    Performed on ACCU-CHEK    CBC auto differential    Collection Time: 03/20/21  5:00 AM   Result Value Ref Range    WBC 11.6 (H) 4.8 - 10.8 K/uL    RBC 3.22 (L) 4.20 - 5.40 M/uL    Hemoglobin 9.8 (L) 12.0 - 16.0 g/dL    Hematocrit 30.2 (L) 37.0 - 47.0 %    MCV 93.8 82.0 - 100.0 fL    MCH 30.4 27.0 - 31.3 pg    MCHC 32.4 (L) 33.0 - 37.0 %    RDW 19.1 (H) 11.5 - 14.5 %    Platelets 413 892 - 572 K/uL    Neutrophils % 91.1 %    Lymphocytes % 3.2 %    Monocytes % 4.7 %    Eosinophils % 0.7 %    Basophils % 0.3 %    Neutrophils Absolute 10.5 (H) 1.4 - 6.5 K/uL    Lymphocytes Absolute 0.4 (L) 1.0 - 4.8 K/uL    Monocytes Absolute 0.5 0.2 - 0.8 K/uL    Eosinophils Absolute 0.1 0.0 - 0.7 K/uL    Basophils Absolute 0.0 0.0 - 0.2 K/uL   Comprehensive Metabolic Panel    Collection Time: 03/20/21  5:00 AM   Result Value Ref Range    Sodium 143 135 - 144 mEq/L    Potassium 4.6 3.4 - 4.9 mEq/L    Chloride 111 (H) 95 - 107 mEq/L    CO2 24 20 - 31 mEq/L    Anion Gap 8 (L) 9 - 15 mEq/L    Glucose 72 70 - 99 mg/dL    BUN 40 (H) 8 - 23 mg/dL    CREATININE 2.07 (H) 0.50 - 0.90 mg/dL    GFR Non-African American 23.2 (L) >60    GFR  28.1 (L) >60    Calcium 8.9 8.5 - 9.9 mg/dL    Total Protein 5.2 (L) 6.3 - 8.0 g/dL    Albumin 3.0 (L) 3.5 - 4.6 g/dL    Total Bilirubin 0.4 0.2 - 0.7 mg/dL    Alkaline Phosphatase 102 40 - 130 U/L     (H) 0 - 33 U/L     (H) 0 - 35 U/L    Globulin 2.2 (L) 2.3 - 3.5 g/dL   Magnesium    Collection Time: 03/20/21  5:00 AM   Result Value Ref Range Magnesium 2.8 (H) 1.7 - 2.4 mg/dL   Phosphorus    Collection Time: 03/20/21  5:00 AM   Result Value Ref Range    Phosphorus 6.4 (H) 2.3 - 4.8 mg/dL   PT    Collection Time: 03/20/21  5:00 AM   Result Value Ref Range    Protime 15.7 (H) 12.3 - 14.9 sec    INR 1.2    PTT    Collection Time: 03/20/21  5:00 AM   Result Value Ref Range    aPTT 37.3 (H) 24.4 - 36.8 sec   POCT Arterial    Collection Time: 03/20/21  5:30 AM   Result Value Ref Range    POC Sodium 142 136 - 145 mEq/L    POC Potassium 4.1 3.5 - 5.1 mEq/L    POC Chloride 110 99 - 110 mEq/L    POC Glucose 140 (H) 60 - 115 mg/dl    POC Creatinine 2.2 (H) 0.6 - 1.2 mg/dL    GFR Non-African American 22 (A) >60    GFR  26 (A) >60    Calcium, Ion 1.23 1.12 - 1.32 mmol/L    pH, Arterial 7.354 7.350 - 7.450    pCO2, Arterial 42 35 - 45 mm Hg    pO2, Arterial 67 (HH) 75 - 108 mm Hg    HCO3, Arterial 23.3 21.0 - 29.0 mmol/L    Base Excess, Arterial -2 -3 - 3    O2 Sat, Arterial 92 (HH) 93 - 100 %    TCO2, Arterial 25 22 - 29    Lactate 1.57 0.40 - 2.00 mmol/L    POC Hematocrit 31 (L) 36 - 48 %    Hemoglobin 10.5 (L) 12.0 - 16.0 gm/dL    FIO2 40.000     Sample Type ART     Performed on SEE BELOW    POCT Glucose    Collection Time: 03/20/21  5:31 AM   Result Value Ref Range    POC Glucose 137 (H) 60 - 115 mg/dl    Performed on ACCU-CHEK    POCT Glucose    Collection Time: 03/20/21 11:13 AM   Result Value Ref Range    POC Glucose 107 60 - 115 mg/dl    Performed on ACCU-CHEK      Troponin:    Lab Results   Component Value Date    TROPONINI 0.056 03/19/2021             ASSESSMENT:    Active Hospital Problems    Diagnosis Date Noted    Cardiac arrest [facility]) [I46.9] 03/18/2021     Priority: Low     Cardiac arrest questionable etiology  Vent dependent respiratory failurequestionable aspiration  History of mild LAD disease.   Normal LV function  Acute kidney injury  Essential hypertensionhypotensive now  History of diastolic congestive heart failure  History of hypertrophic cardiomyopathy  Encephalopathy      PLAN:   1. As always, aggressive risk factor modification is strongly recommended. We should adhere to the JNC VIII guidelines for HTN management and the NCEP ATP III guidelines for LDL-C management. 2. ICU supportive care and management  3. Wean pressors off as tolerated  4. Wean sedation and vent off as tolerated  5. Coronary evaluation in future when feasible  6. Monitor on telemetry  7. Keep potassium greater than 4, magnesium greater than 2  8.  GI/DVT prophylaxis      Electronically signed by Perry Cardona DO on 3/20/2021 at 12:14 PM

## 2021-03-20 NOTE — PROGRESS NOTES
Hospitalist Progress Note      PCP: Belle Gomez MD    Date of Admission: 3/18/2021    Chief Complaint:  Patient remains intubated, on mechanical ventilation, sedated with propofol, on low dose norepinephrine infusion, following commands since last night per nursing staff    Medications:  Reviewed    Infusion Medications    norepinephrine 2 mcg/min (03/20/21 0941)    dextrose      propofol 40 mcg/kg/min (03/20/21 0942)    pantoprozole (PROTONIX) infusion 8 mg/hr (03/20/21 0419)    lactated ringers Stopped (03/19/21 0410)     Scheduled Medications    etomidate        etomidate        insulin lispro  0-6 Units Subcutaneous 6 times per day    pantoprazole (PROTONIX) bolus  80 mg Intravenous Once    chlorhexidine  15 mL Mouth/Throat BID    sodium chloride flush  10 mL Intravenous 2 times per day    ipratropium-albuterol  1 ampule Inhalation 4x daily     PRN Meds: morphine, hydrALAZINE **OR** hydrALAZINE, glucose, dextrose, glucagon (rDNA), dextrose, labetalol, labetalol, promethazine **OR** ondansetron, sodium chloride flush, polyethylene glycol, acetaminophen **OR** acetaminophen      Intake/Output Summary (Last 24 hours) at 3/20/2021 1009  Last data filed at 3/20/2021 0600  Gross per 24 hour   Intake 176.13 ml   Output 1150 ml   Net -973.87 ml       Exam:    BP (!) 86/43   Pulse 91   Temp 98.1 °F (36.7 °C) (Bladder)   Resp 16   Ht 4' 11\" (1.499 m)   Wt 157 lb 3 oz (71.3 kg)   LMP  (LMP Unknown)   SpO2 100%   BMI 31.75 kg/m²     General appearance: intubated, sedated  Lungs: clear to auscultation bilaterally  Heart: S1S2,RRR  Abdomen: soft, BS active  Extremities: no edema    Labs:   Recent Labs     03/19/21  0051 03/19/21  0556 03/19/21  0556 03/19/21  1309 03/20/21  0500 03/20/21  0530   WBC 19.9* 15.4*  --   --  11.6*  --    HGB 10.5* 10.8*   < > 9.2* 9.8* 10.5*   HCT 32.9* 33.5*  --   --  30.2*  --     292  --   --  276  --     < > = values in this interval not displayed.      Recent Labs     03/19/21  0950 03/19/21  0950 03/19/21  2137 03/20/21  0033 03/20/21  0500 03/20/21  0530   *  --   --  145* 143  --    K 4.7  --  4.6 4.6 4.6  --    *  --   --  111* 111*  --    CO2 23  --   --  22 24  --    BUN 42*  --   --  37* 40*  --    CREATININE 2.50*   < >  --  2.05* 2.07* 2.2*   CALCIUM 9.3  --   --  8.8 8.9  --    PHOS 4.8  --   --  6.0* 6.4*  --     < > = values in this interval not displayed. Recent Labs     03/18/21  1615 03/19/21  0557 03/20/21  0500   AST 26 480* 196*   ALT 20 331* 254*   BILITOT 0.3 0.7 0.4   ALKPHOS 56 124 102     Recent Labs     03/18/21  1845 03/19/21  0600 03/20/21  0500   INR 1.3 1.3 1.2     Recent Labs     03/18/21  1615 03/18/21  2300 03/19/21  0557   TROPONINI 0.048* 0.067* 0.056*       Urinalysis:      Lab Results   Component Value Date    NITRU Negative 10/18/2020    WBCUA 6-9 10/18/2020    BACTERIA Negative 10/18/2020    RBCUA 3-5 10/18/2020    BLOODU SMALL 10/18/2020    SPECGRAV 1.009 10/18/2020    GLUCOSEU Negative 10/18/2020       Radiology:  CT HEAD WO CONTRAST   Final Result   Impression:      Mild cerebral atrophy. Chronic ischemic white matter disease. Remote left thalamic lacunar infarct. Bilateral ethmoid and sphenoid sinusitis. All CT scans at this facility use dose modulation, iterative reconstruction, and/or weight based dosing when appropriate to reduce radiation dose to as low as reasonably achievable. XR CHEST PORTABLE   Final Result   INTERVAL RETRACTION OF THE ENDOTRACHEAL TUBE IN THE TRACHEA. CONSIDER RETRACTING ENDOTRACHEAL TUBE 1 CM. LEFT LOWER LUNG ATELECTASIS. XR CHEST PORTABLE   Final Result   BIBASILAR SUBSEGMENTAL ATELECTASIS/PNEUMONIA IN THIS PARTIALLY EXPIRATORY CHEST RADIOGRAPH. XR CHEST PORTABLE   Final Result   RIGHT LOWER LUNG SUBSEGMENTAL ATELECTASIS/PNEUMONIA.       INTERVAL INFERIOR MIGRATION ENDOTRACHEAL TUBE 3 CM WITH TIP NOW JUST PROXIMAL TO FAISAL. MAY WISH TO RETRACT ENDOTRACHEAL TUBE 2 CM.       XR CHEST PORTABLE    (Results Pending)           Assessment/Plan:    67 y.o. female with PMH of nonobstructive CAD, diastolic HF, HOCM, HTN, IDDM2, CKD3, anemia, prolonged hospitalization in 27/4430 complicated by cardiac arrest s/p ROSC,requiring transvenous pacing due to persistent bradycardia and hypotension despite pressor support, SAÚL,septic shock who presented with:     Cardiac arrest s/p ROSC   - required Dopamine, norepinephrine and epinephrine infusion  - off pressors  - on hypothermia protocol  - cardiology service to follow     Acute hypoxic respiratory failure  - requiring intubation and mechanical ventilation  - management per critical care service    Acute encephalopathy  - likely hypoxic-ischemic etiology from cardiac arrest  - CT head was negative per report  - more responsive, following commands off sedation  - neurology following     Acute / chronic anemia   - with Hb of 5.8 on arrival, FOBT was positive  - improved s/p PRBCs given in ED  - on Protonix infusion  - GI following  - follow H/H closely     Lactic acidosis  - in the setting of cardiac arrest  - improved with volume repletion     Hyperkalemia  - resolved     SAÚL/CKD3  - on IVFs  - nephrology consulted     IDDM2 with hyperglycemia  - improved, off insulin drip     Leukocytosis   - trending down, follow trend    Code status - [facility]        Electronically signed by Juan Eason MD on 3/20/2021 at 10:09 AM

## 2021-03-20 NOTE — PROGRESS NOTES
I was called by respiratory to swap out the ET tube due to a possible cuff leak. Patient was sedated with 20 mg of etomidate and the currently positioned ET tube was removed along with the OG tube. I attempted to visualize the cords with the MAC blade but was not able to get a good view. On second attempt tube was placed with assistance by respiratory. Unfortunately the tube was malpositioned into the esophagus leading to desaturation bradycardia. Rapid response was called however a second ET tube was placed alongside the malpositioned tube into the trachea with good air movement and oxygenation and resolution of heart rate. Post intubation lung sounds were decreased on the left chest x-ray showed intubation to the right mainstem bronchus and ET tube was withdrawn repeat chest x-ray showed good positioning with good lung expansion bilaterally. Patient had some mild hypotension following intubation which is initially attributed to the increase in sedation for ET tube placement. Patient started on low-dose Levophed.

## 2021-03-20 NOTE — PROGRESS NOTES
Spiritual Care Services     Summary of Visit:  Pt intubated, some movement present. Spouse Librado at bedside. Dtr present yesterday, will come again when permitted. Grateful pt is stable once more. Prayer and emotional support provided. Spiritual Assessment/Intervention/Outcomes:    Encounter Summary  Services provided to[de-identified] Patient and family together  Referral/Consult From[de-identified] Rounding  Support System: Spouse, Children  Continue Visiting: Yes  Complexity of Encounter: Moderate  Length of Encounter: 30 minutes  Spiritual Assessment Completed: Yes     Crisis  Type: Code(Blue)  Assessment: Unable to respond  Intervention: Sustaining presence/ Ministry of presence  Spiritual/Scientologist  Type: Spiritual support  Assessment: Approachable, Calm, Coping  Intervention: Prayer, Nurtured hope, Explored feelings, thoughts, concerns, Discussed illness/injury and it's impact  Outcome: Expressed gratitude, Engaged in conversation, Expressed feelings/needs/concerns, Coping     Grief and Life Adjustment  Type: Adjustment to illness  Assessment: Tearful, Approachable, Shock, Despair  Intervention: Active listening, Sustaining presence/ Ministry of presence  Outcome: Receptive, Comfort, Tearful, Less anxious, less agitated                   Care Plan:    Ongoing spiritual and emotional support for family. Spiritual Care Services   Electronically signed by Carter Echevarria on 3/20/21 at 11:04 AM EDT     To reach a  for emotional and spiritual support, place an [facility] consult request.   If a  is needed immediately, dial 0 and ask to page the on-call .

## 2021-03-20 NOTE — PROGRESS NOTES
Nephrology Progress Note    Assessment:  SAÚL on ckd-4 stable  S/P cardiac arrest  Encephalopathy improved  Ventilator dependent  DM type-2  Hx Hypretnsion        Plan:watch urine out-puts  Maintain hydration  Labs in morning    Patient Active Problem List:     Hypertension     Hyperlipidemia     Uncontrolled type 2 diabetes mellitus with complication, without long-term current use of insulin (HCC)     Fibromyalgia     Anxiety     Smoker     Insomnia     DJD (degenerative joint disease), lumbar     Lumbosacral radiculopathy at S1     DDD (degenerative disc disease), lumbar     Dysphonia     CTS (carpal tunnel syndrome)     High risk medication use-Norco - 12/20/17 OARRS PM&R, 02/20/18 OARRS PM&R, 03/07/18 OARRS PM&R, Urine Drug screen negative 02/06/17 PM&R--MED CONTRACT 2/6/17     SOB (shortness of breath) on exertion     Chest pain     Memory deficit     Artificial lens present     Presbyopia     Astigmatism, regular     Cataract, nuclear sclerotic senile     IDDM (insulin dependent diabetes mellitus)     Regular astigmatism     Nuclear senile cataract     Neck pain     Lumbosacral radiculopathy at L5     Spinal stenosis of lumbar region with neurogenic claudication     Impaired mobility and activities of daily living     Non-compliant patient NO showed FU 1/10/17 Dr Rajat Simmons     Insomnia secondary to chronic pain     Reactive depression     Diabetic radiculopathy (HCC)     Cervical radicular pain     Diabetic asymmetric polyneuropathy (HCC)     Myalgia     Intercostal neuropathy     Osteoarthritis of spine with radiculopathy, lumbar region     Acute combined systolic and diastolic CHF, NYHA class 1 (HCC)     PMB (postmenopausal bleeding)     Acute on chronic diastolic heart failure (HCC)     CHF (congestive heart failure) ([address]     Hypertensive urgency     Uncontrolled type 2 diabetes mellitus with hyperglycemia ([address]     Chronic obstructive pulmonary disease with acute exacerbation (HCC)     Acute on chronic diastolic (congestive) heart failure (HCC)     SAÚL (acute kidney injury) ([facility]     Hypoglycemia     HOCM (hypertrophic obstructive cardiomyopathy) (HCC)     Gastrointestinal hemorrhage     COPD exacerbation (HCC)     Anemia     AVM (arteriovenous malformation)     Symptomatic anemia     Flash pulmonary edema (HCC)     Acute on chronic kidney failure (HCC)     Dysarthria     Chronic fatigue     Acute encephalopathy     Adenomatous polyp of sigmoid colon     Adenomatous polyp of transverse colon     Sepsis (HCC)     Major depressive disorder in remission ([facility]     Gastroesophageal reflux disease without esophagitis     Acute cystitis without hematuria     CKD (chronic kidney disease) stage 4, GFR 15-29 ml/min (HCC)     Cardiopulmonary arrest ([facility] [address]     Gait abnormality     Late effects of CVA (cerebrovascular accident)     Cardiac arrest ([facility] [address]      Subjective:  Admit Date: 3/18/2021    Interval History: sedated unable to respond    Medications:  Scheduled Meds:   etomidate        etomidate        insulin lispro  0-6 Units Subcutaneous 6 times per day    pantoprazole (PROTONIX) bolus  80 mg Intravenous Once    chlorhexidine  15 mL Mouth/Throat BID    sodium chloride flush  10 mL Intravenous 2 times per day    ipratropium-albuterol  1 ampule Inhalation 4x daily     Continuous Infusions:   norepinephrine 2 mcg/min (03/20/21 0941)    dextrose      propofol 40 mcg/kg/min (03/20/21 0942)    pantoprozole (PROTONIX) infusion 8 mg/hr (03/20/21 0419)    lactated ringers Stopped (03/19/21 0410)       CBC:   Recent Labs     03/19/21  0556 03/19/21  0556 03/20/21  0500 03/20/21  0530   WBC 15.4*  --  11.6*  --    HGB 10.8*   < > 9.8* 10.5*     --  276  --     < > = values in this interval not displayed.      CMP:    Recent Labs     03/19/21  0950 03/19/21  0950 03/19/21  2137 03/20/21  0033 03/20/21  0500 03/20/21  0530   *  --   --  145* 143  --    K 4.7  --  4.6 4.6 4.6  --    *  --   --  111* 111* --    CO2 23  --   --  22 24  --    BUN 42*  --   --  37* 40*  --    CREATININE 2.50*   < >  --  2.05* 2.07* 2.2*   GLUCOSE 179*  --   --  74 72  --    CALCIUM 9.3  --   --  8.8 8.9  --    LABGLOM 18.7*   < >  --  23.5* 23.2* 22*    < > = values in this interval not displayed. Troponin:   Recent Labs     03/19/21  0557   TROPONINI 0.056*     BNP: No results for input(s): BNP in the last 72 hours. INR:   Recent Labs     03/20/21  0500   INR 1.2     Lipids: No results for input(s): CHOL, LDLDIRECT, TRIG, HDL, AMYLASE, LIPASE in the last 72 hours. Liver:   Recent Labs     03/20/21  0500   *   *   ALKPHOS 102   PROT 5.2*   LABALBU 3.0*   BILITOT 0.4     Iron:  No results for input(s): IRONS, FERRITIN in the last 72 hours. Invalid input(s): LABIRONS  Urinalysis: No results for input(s): UA in the last 72 hours.     Objective:  Vitals: BP (!) 86/43   Pulse 87   Temp 98.1 °F (36.7 °C) (Bladder)   Resp 16   Ht 4' 11\" (1.499 m)   Wt 157 lb 3 oz (71.3 kg)   LMP  (LMP Unknown)   SpO2 100%   BMI 31.75 kg/m²    Wt Readings from Last 3 Encounters:   03/18/21 157 lb 3 oz (71.3 kg)   01/15/21 131 lb (59.4 kg)   11/27/20 120 lb (54.4 kg)      24HR INTAKE/OUTPUT:      Intake/Output Summary (Last 24 hours) at 3/20/2021 1150  Last data filed at 3/20/2021 0600  Gross per 24 hour   Intake 176.13 ml   Output 1150 ml   Net -973.87 ml       General: non alert, in no apparent distress sedated  HEENT: normocephalic, atraumatic, anicteric  Neck: supple, no mass  ET in place   Lungs: non-labored respirations, clear to auscultation bilaterally  Heart: regular rate and rhythm, no murmurs or rubs  Abdomen: soft, non-tender, non-distended  Ext: no cyanosis, no peripheral edema  Neuro: alert and oriented, no gross abnormalities  Psych: normal mood and affect  Skin: no rash      Electronically signed by Jen Mccall MD

## 2021-03-20 NOTE — FLOWSHEET NOTE
Spiritual Care Services     Summary of Visit:   responded to code blue, no family present, ministry of presence    Spiritual Assessment/Intervention/Outcomes:    Encounter Summary  Services provided to[de-identified] Patient  Referral/Consult From[de-identified] Physician, Nurse  Support System: Spouse, Children, Family members  Continue Visiting: Yes  Complexity of Encounter: High  Length of Encounter: (P) 30 minutes  Spiritual Assessment Completed: (P) Yes     Crisis  Type: Code(Blue)  Assessment: Unable to respond  Intervention: Sustaining presence/ Ministry of presence        Grief and Life Adjustment  Type: Adjustment to illness  Assessment: Tearful, Approachable, Shock, Despair  Intervention: Active listening, Sustaining presence/ Ministry of presence  Outcome: Receptive, Comfort, Tearful, Less anxious, less agitated                   Care Plan:      Spiritual Care Services   Electronically signed by Matias Joshi on 3/20/21 at 6:28 AM EDT     To reach a  for emotional and spiritual support, place an [facility] consult request.   If a  is needed immediately, dial 0 and ask to page the on-call .

## 2021-03-20 NOTE — PROGRESS NOTES
Shift summary  Handoff report received at bedside. Patient responds to commands. She has been anxious throughout the night. Arctic sun is in rewarming phase. Approximately 0500 noticed patient having increased oral secretions and balloon attached to ET tube had decreased pressure. Alerted respiratory therapy and they came to bedside to assess. Respiratory alerted RN the cuff on ET tube appeared to have decreased pressure and ventilator volumes were lessening. Dr Dusty Gill was alerted and came to bedside to exchange ET tube. Chest xray was performed to confirm placement. Patients  was updated on patient's current condition.

## 2021-03-20 NOTE — PROGRESS NOTES
I did speak with the patient's  at the bedside this morning. The  was present as well. We discussed the events overnight including the reintubation. We also discussed that her tube became dislodged. He did have questions regarding the next steps and the CT scan. She will be evaluated by neurology today. The plan of care was discussed, updates were given, and questions were answered.

## 2021-03-21 LAB
ALBUMIN SERPL-MCNC: 2.7 G/DL (ref 3.5–4.6)
ALP BLD-CCNC: 93 U/L (ref 40–130)
ALT SERPL-CCNC: 164 U/L (ref 0–33)
ANION GAP SERPL CALCULATED.3IONS-SCNC: 9 MEQ/L (ref 9–15)
APTT: 42.1 SEC (ref 24.4–36.8)
AST SERPL-CCNC: 90 U/L (ref 0–35)
BASOPHILS ABSOLUTE: 0 K/UL (ref 0–0.2)
BASOPHILS RELATIVE PERCENT: 0.4 %
BILIRUB SERPL-MCNC: <0.2 MG/DL (ref 0.2–0.7)
BUN BLDV-MCNC: 37 MG/DL (ref 8–23)
CALCIUM IONIZED, CALC AT PH 7.4: 1.29 MMOL/L (ref 1.11–1.3)
CALCIUM IONIZED: 1.36 MMOL/L (ref 1.11–1.3)
CALCIUM SERPL-MCNC: 8.3 MG/DL (ref 8.5–9.9)
CHLORIDE BLD-SCNC: 108 MEQ/L (ref 95–107)
CO2: 23 MEQ/L (ref 20–31)
CREAT SERPL-MCNC: 2.04 MG/DL (ref 0.5–0.9)
EOSINOPHILS ABSOLUTE: 0.1 K/UL (ref 0–0.7)
EOSINOPHILS RELATIVE PERCENT: 0.9 %
GFR AFRICAN AMERICAN: 28.6
GFR NON-AFRICAN AMERICAN: 23.6
GLOBULIN: 2.4 G/DL (ref 2.3–3.5)
GLUCOSE BLD-MCNC: 182 MG/DL (ref 60–115)
GLUCOSE BLD-MCNC: 197 MG/DL (ref 60–115)
GLUCOSE BLD-MCNC: 213 MG/DL (ref 70–99)
GLUCOSE BLD-MCNC: 215 MG/DL (ref 60–115)
HCT VFR BLD CALC: 27.1 % (ref 37–47)
HEMOGLOBIN: 8.6 G/DL (ref 12–16)
INR BLD: 1.3
LYMPHOCYTES ABSOLUTE: 0.5 K/UL (ref 1–4.8)
LYMPHOCYTES RELATIVE PERCENT: 5.1 %
MAGNESIUM: 2.8 MG/DL (ref 1.7–2.4)
MCH RBC QN AUTO: 29.6 PG (ref 27–31.3)
MCHC RBC AUTO-ENTMCNC: 31.8 % (ref 33–37)
MCV RBC AUTO: 93.1 FL (ref 82–100)
MONOCYTES ABSOLUTE: 0.6 K/UL (ref 0.2–0.8)
MONOCYTES RELATIVE PERCENT: 5.8 %
NEUTROPHILS ABSOLUTE: 9.3 K/UL (ref 1.4–6.5)
NEUTROPHILS RELATIVE PERCENT: 87.8 %
PDW BLD-RTO: 18.1 % (ref 11.5–14.5)
PERFORMED ON: ABNORMAL
PHOSPHORUS: 4.7 MG/DL (ref 2.3–4.8)
PLATELET # BLD: 272 K/UL (ref 130–400)
POTASSIUM SERPL-SCNC: 4 MEQ/L (ref 3.4–4.9)
PROTHROMBIN TIME: 16.5 SEC (ref 12.3–14.9)
RBC # BLD: 2.91 M/UL (ref 4.2–5.4)
SODIUM BLD-SCNC: 140 MEQ/L (ref 135–144)
TOTAL PROTEIN: 5.1 G/DL (ref 6.3–8)
WBC # BLD: 10.6 K/UL (ref 4.8–10.8)

## 2021-03-21 PROCEDURE — 36415 COLL VENOUS BLD VENIPUNCTURE: CPT

## 2021-03-21 PROCEDURE — 6360000002 HC RX W HCPCS: Performed by: INTERNAL MEDICINE

## 2021-03-21 PROCEDURE — 80053 COMPREHEN METABOLIC PANEL: CPT

## 2021-03-21 PROCEDURE — 2580000003 HC RX 258: Performed by: INTERNAL MEDICINE

## 2021-03-21 PROCEDURE — 2000000000 HC ICU R&B

## 2021-03-21 PROCEDURE — C9113 INJ PANTOPRAZOLE SODIUM, VIA: HCPCS | Performed by: INTERNAL MEDICINE

## 2021-03-21 PROCEDURE — 6370000000 HC RX 637 (ALT 250 FOR IP): Performed by: INTERNAL MEDICINE

## 2021-03-21 PROCEDURE — 85730 THROMBOPLASTIN TIME PARTIAL: CPT

## 2021-03-21 PROCEDURE — 99233 SBSQ HOSP IP/OBS HIGH 50: CPT | Performed by: PSYCHIATRY & NEUROLOGY

## 2021-03-21 PROCEDURE — 94003 VENT MGMT INPAT SUBQ DAY: CPT

## 2021-03-21 PROCEDURE — 36592 COLLECT BLOOD FROM PICC: CPT

## 2021-03-21 PROCEDURE — 85025 COMPLETE CBC W/AUTO DIFF WBC: CPT

## 2021-03-21 PROCEDURE — 85610 PROTHROMBIN TIME: CPT

## 2021-03-21 PROCEDURE — 84100 ASSAY OF PHOSPHORUS: CPT

## 2021-03-21 PROCEDURE — 94760 N-INVAS EAR/PLS OXIMETRY 1: CPT

## 2021-03-21 PROCEDURE — 99291 CRITICAL CARE FIRST HOUR: CPT | Performed by: INTERNAL MEDICINE

## 2021-03-21 PROCEDURE — 94761 N-INVAS EAR/PLS OXIMETRY MLT: CPT

## 2021-03-21 PROCEDURE — 83735 ASSAY OF MAGNESIUM: CPT

## 2021-03-21 PROCEDURE — 94640 AIRWAY INHALATION TREATMENT: CPT

## 2021-03-21 PROCEDURE — 2700000000 HC OXYGEN THERAPY PER DAY

## 2021-03-21 PROCEDURE — 99233 SBSQ HOSP IP/OBS HIGH 50: CPT | Performed by: INTERNAL MEDICINE

## 2021-03-21 PROCEDURE — 82330 ASSAY OF CALCIUM: CPT

## 2021-03-21 RX ORDER — SODIUM CHLORIDE 9 MG/ML
10 INJECTION INTRAVENOUS 2 TIMES DAILY
Status: DISCONTINUED | OUTPATIENT
Start: 2021-03-21 | End: 2021-03-23

## 2021-03-21 RX ORDER — PANTOPRAZOLE SODIUM 40 MG/10ML
40 INJECTION, POWDER, LYOPHILIZED, FOR SOLUTION INTRAVENOUS 2 TIMES DAILY
Status: DISCONTINUED | OUTPATIENT
Start: 2021-03-21 | End: 2021-03-23

## 2021-03-21 RX ADMIN — CHLORHEXIDINE GLUCONATE 15 ML: 1.2 RINSE ORAL at 20:27

## 2021-03-21 RX ADMIN — IPRATROPIUM BROMIDE AND ALBUTEROL SULFATE 1 AMPULE: .5; 3 SOLUTION RESPIRATORY (INHALATION) at 10:20

## 2021-03-21 RX ADMIN — PANTOPRAZOLE SODIUM 40 MG: 40 INJECTION, POWDER, FOR SOLUTION INTRAVENOUS at 20:27

## 2021-03-21 RX ADMIN — IPRATROPIUM BROMIDE AND ALBUTEROL SULFATE 1 AMPULE: .5; 3 SOLUTION RESPIRATORY (INHALATION) at 03:21

## 2021-03-21 RX ADMIN — PROPOFOL 20 MCG/KG/MIN: 10 INJECTION, EMULSION INTRAVENOUS at 20:28

## 2021-03-21 RX ADMIN — IPRATROPIUM BROMIDE AND ALBUTEROL SULFATE 1 AMPULE: .5; 3 SOLUTION RESPIRATORY (INHALATION) at 19:52

## 2021-03-21 RX ADMIN — Medication 10 ML: at 20:28

## 2021-03-21 RX ADMIN — SODIUM CHLORIDE 8 MG/HR: 9 INJECTION, SOLUTION INTRAVENOUS at 00:38

## 2021-03-21 RX ADMIN — Medication 10 ML: at 07:54

## 2021-03-21 RX ADMIN — SODIUM CHLORIDE, PRESERVATIVE FREE 10 ML: 5 INJECTION INTRAVENOUS at 20:27

## 2021-03-21 RX ADMIN — PROPOFOL 40 MCG/KG/MIN: 10 INJECTION, EMULSION INTRAVENOUS at 00:37

## 2021-03-21 RX ADMIN — INSULIN LISPRO 2 UNITS: 100 INJECTION, SOLUTION INTRAVENOUS; SUBCUTANEOUS at 04:01

## 2021-03-21 RX ADMIN — CHLORHEXIDINE GLUCONATE 15 ML: 1.2 RINSE ORAL at 07:54

## 2021-03-21 RX ADMIN — INSULIN LISPRO 2 UNITS: 100 INJECTION, SOLUTION INTRAVENOUS; SUBCUTANEOUS at 07:55

## 2021-03-21 RX ADMIN — IPRATROPIUM BROMIDE AND ALBUTEROL SULFATE 1 AMPULE: .5; 3 SOLUTION RESPIRATORY (INHALATION) at 15:27

## 2021-03-21 ASSESSMENT — PULMONARY FUNCTION TESTS
PIF_VALUE: 22
PIF_VALUE: 23
PIF_VALUE: 25
PIF_VALUE: 22
PIF_VALUE: 24
PIF_VALUE: 24
PIF_VALUE: 23
PIF_VALUE: 24
PIF_VALUE: 25
PIF_VALUE: 24
PIF_VALUE: 22
PIF_VALUE: 24
PIF_VALUE: 23
PIF_VALUE: 22
PIF_VALUE: 25
PIF_VALUE: 22
PIF_VALUE: 24
PIF_VALUE: 21
PIF_VALUE: 21
PIF_VALUE: 23
PIF_VALUE: 24
PIF_VALUE: 22
PIF_VALUE: 25
PIF_VALUE: 23
PIF_VALUE: 23
PIF_VALUE: 21

## 2021-03-21 NOTE — PROGRESS NOTES
Chief Complaint   Patient presents with    Respiratory Distress       SUBJECTIVE: On vent. Sedated just turned off.  present at the bedside. Hemodynamically stable, off of pressors now.   Normal sinus rhythm on telemetry heart rate of 97bpm    Past Medical History:   Diagnosis Date    Anxiety     Asthma     dx 2019 / has smoked since age 14    [facility] failure) [facility]     Chronic back pain     Bilateral L5 S1 Radic on emg--surprisingly worse on the left than the right--pt's symptoms and her MRI show worse on the right    Chronic obstructive pulmonary disease with acute exacerbation ([facility] [address] 10/12/2019    Depression     Fibromyalgia     Hyperlipidemia     meds > 8 yrs    Hypertension     meds > 45 yrs    On home O2     2l per n/c at bedtime mostly,     Osteoarthritis     Type II diabetes mellitus, uncontrolled ([facility])     hx > 8 yrs    Unspecified sleep apnea      Patient Active Problem List   Diagnosis    Hypertension    Hyperlipidemia    Uncontrolled type 2 diabetes mellitus with complication, without long-term current use of insulin ([facility])    Fibromyalgia    Anxiety    Smoker    Insomnia    DJD (degenerative joint disease), lumbar    Lumbosacral radiculopathy at S1    DDD (degenerative disc disease), lumbar    Dysphonia    CTS (carpal tunnel syndrome)    High risk medication use-Norco - 12/20/17 OARRS PM&R, 02/20/18 OARRS PM&R, 03/07/18 OARRS PM&R, Urine Drug screen negative 02/06/17 PM&R--MED CONTRACT 2/6/17    SOB (shortness of breath) on exertion    Chest pain    Memory deficit    Artificial lens present    Presbyopia    Astigmatism, regular    Cataract, nuclear sclerotic senile    IDDM (insulin dependent diabetes mellitus)    Regular astigmatism    Nuclear senile cataract    Neck pain    Lumbosacral radiculopathy at L5    Spinal stenosis of lumbar region with neurogenic claudication    Impaired mobility and activities of daily living    Non-compliant Intravenous BID Rosalba Padron MD        And    sodium chloride (PF) 0.9 % injection 10 mL  10 mL Intravenous BID Rosalba Padron MD        morphine (PF) injection 2 mg  2 mg Intravenous Q2H PRN Sejal Donning, DO        hydrALAZINE (APRESOLINE) injection 10 mg  10 mg Intravenous Q4H PRN [facility], DO   10 mg at 03/19/21 0606    Or    hydrALAZINE (APRESOLINE) injection 20 mg  20 mg Intravenous Q4H PRN [facility], DO        glucose (GLUTOSE) 40 % oral gel 15 g  15 g Oral PRN [facility], DO        dextrose 50 % IV solution  12.5 g Intravenous PRN [facility]ar, DO   12.5 g at 03/20/21 0438    glucagon (rDNA) injection 1 mg  1 mg Intramuscular PRN [facility]ar, DO        dextrose 5 % solution  100 mL/hr Intravenous PRN [facility]ar, DO        labetalol (NORMODYNE;TRANDATE) injection 20 mg  20 mg Intravenous Q4H PRN Sejal Donning, DO   20 mg at 03/19/21 1701    labetalol (NORMODYNE;TRANDATE) injection 40 mg  40 mg Intravenous Q4H PRN Sejal Donning, DO   40 mg at 03/19/21 0827    propofol injection  5-50 mcg/kg/min Intravenous Titrated Regla Florence, DO 8.6 mL/hr at 03/21/21 1159 20 mcg/kg/min at 03/21/21 1159    pantoprazole (PROTONIX) 80 mg in sodium chloride 0.9 % 50 mL bolus  80 mg Intravenous Once Amber Marie MD   Stopped at 03/18/21 1826    promethazine (PHENERGAN) tablet 12.5 mg  12.5 mg Oral Q6H PRN Amber Marie MD        Or    ondansetron (ZOFRAN) injection 4 mg  4 mg Intravenous Q6H PRN mAber Marie MD        chlorhexidine (PERIDEX) 0.12 % solution 15 mL  15 mL Mouth/Throat BID Amber Marie MD   15 mL at 03/21/21 0754    sodium chloride flush 0.9 % injection 10 mL  10 mL Intravenous 2 times per day Amber Marie MD   10 mL at 03/21/21 0754    sodium chloride flush 0.9 % injection 10 mL  10 mL Intravenous PRN Amber MD Frank        polyethylene glycol (GLYCOLAX) packet 17 g  17 g Oral Daily PRN Amber Marie MD        acetaminophen (TYLENOL) tablet 650 mg  650 mg Oral Q6H PRN Coral Robin MD        Or    acetaminophen (TYLENOL) suppository 650 mg  650 mg Rectal Q6H PRFANY Robin MD        ipratropium-albuterol (DUONEB) nebulizer solution 1 ampule  1 ampule Inhalation 4x daily Thanh Howe MD   1 ampule at 03/21/21 1020       ALLERGIES: Ibuprofen, Metformin and related, and Darvon [propoxyphene hcl]      OBJECTIVE:     VITALS:  Blood pressure (!) 141/61, pulse 94, temperature 99 °F (37.2 °C), temperature source Bladder, resp. rate 20, height 4' 11\" (1.499 m), weight 147 lb 4.3 oz (66.8 kg), SpO2 100 %, not currently breastfeeding. Body mass index is 29.74 kg/m². Physical Exam   Constitutional: She appears well-developed. HENT:   Head: Normocephalic and atraumatic. Neck: Neck supple. No JVD present. No tracheal deviation present. No thyromegaly present. Cardiovascular: Normal rate, regular rhythm, normal heart sounds and intact distal pulses. PMI is not displaced. Exam reveals no gallop, no S3, no distant heart sounds and no friction rub. No murmur heard. Pulmonary/Chest: No respiratory distress. She has no wheezes. She has no rales. She exhibits no tenderness. Abdominal: Soft. Bowel sounds are normal. She exhibits no distension and no mass. There is no abdominal tenderness. There is no rebound and no guarding. Musculoskeletal:         General: No edema. Neurological: No cranial nerve deficit. Skin: Skin is warm and dry. No rash noted. She is not diaphoretic. No erythema. No pallor.          LABS:  Recent Results (from the past 24 hour(s))   POCT Glucose    Collection Time: 03/20/21  4:40 PM   Result Value Ref Range    POC Glucose 111 60 - 115 mg/dl    Performed on ACCU-CHEK    POCT Glucose    Collection Time: 03/20/21  7:38 PM   Result Value Ref Range    POC Glucose 135 (H) 60 - 115 mg/dl    Performed on ACCU-CHEK    POCT Glucose    Collection Time: 03/20/21 11:30 PM   Result Value Ref Range    POC Glucose 178 (H) 60 - 115 mg/dl    Performed on ACCU-CHEK POCT Glucose    Collection Time: 03/21/21  3:57 AM   Result Value Ref Range    POC Glucose 215 (H) 60 - 115 mg/dl    Performed on ACCU-CHEK    CBC auto differential    Collection Time: 03/21/21  5:28 AM   Result Value Ref Range    WBC 10.6 4.8 - 10.8 K/uL    RBC 2.91 (L) 4.20 - 5.40 M/uL    Hemoglobin 8.6 (L) 12.0 - 16.0 g/dL    Hematocrit 27.1 (L) 37.0 - 47.0 %    MCV 93.1 82.0 - 100.0 fL    MCH 29.6 27.0 - 31.3 pg    MCHC 31.8 (L) 33.0 - 37.0 %    RDW 18.1 (H) 11.5 - 14.5 %    Platelets 397 826 - 960 K/uL    Neutrophils % 87.8 %    Lymphocytes % 5.1 %    Monocytes % 5.8 %    Eosinophils % 0.9 %    Basophils % 0.4 %    Neutrophils Absolute 9.3 (H) 1.4 - 6.5 K/uL    Lymphocytes Absolute 0.5 (L) 1.0 - 4.8 K/uL    Monocytes Absolute 0.6 0.2 - 0.8 K/uL    Eosinophils Absolute 0.1 0.0 - 0.7 K/uL    Basophils Absolute 0.0 0.0 - 0.2 K/uL   Comprehensive Metabolic Panel    Collection Time: 03/21/21  5:28 AM   Result Value Ref Range    Sodium 140 135 - 144 mEq/L    Potassium 4.0 3.4 - 4.9 mEq/L    Chloride 108 (H) 95 - 107 mEq/L    CO2 23 20 - 31 mEq/L    Anion Gap 9 9 - 15 mEq/L    Glucose 213 (H) 70 - 99 mg/dL    BUN 37 (H) 8 - 23 mg/dL    CREATININE 2.04 (H) 0.50 - 0.90 mg/dL    GFR Non-African American 23.6 (L) >60    GFR  28.6 (L) >60    Calcium 8.3 (L) 8.5 - 9.9 mg/dL    Total Protein 5.1 (L) 6.3 - 8.0 g/dL    Albumin 2.7 (L) 3.5 - 4.6 g/dL    Total Bilirubin <0.2 0.2 - 0.7 mg/dL    Alkaline Phosphatase 93 40 - 130 U/L     (H) 0 - 33 U/L    AST 90 (H) 0 - 35 U/L    Globulin 2.4 2.3 - 3.5 g/dL   Magnesium    Collection Time: 03/21/21  5:28 AM   Result Value Ref Range    Magnesium 2.8 (H) 1.7 - 2.4 mg/dL   Phosphorus    Collection Time: 03/21/21  5:28 AM   Result Value Ref Range    Phosphorus 4.7 2.3 - 4.8 mg/dL   PT    Collection Time: 03/21/21  5:28 AM   Result Value Ref Range    Protime 16.5 (H) 12.3 - 14.9 sec    INR 1.3    PTT    Collection Time: 03/21/21  5:28 AM   Result Value Ref Range aPTT 42.1 (H) 24.4 - 36.8 sec   POCT Glucose    Collection Time: 03/21/21 11:37 AM   Result Value Ref Range    POC Glucose 197 (H) 60 - 115 mg/dl    Performed on ACCU-CHEK      Troponin:    Lab Results   Component Value Date    TROPONINI 0.056 03/19/2021             ASSESSMENT:    Active Hospital Problems    Diagnosis Date Noted    Cardiac arrest [facility]) [I46.9] 03/18/2021     Priority: Low     Cardiac arrest questionable etiology  Vent dependent respiratory failurequestionable aspiration  History of mild LAD disease. Normal LV function  Acute kidney injury  Essential hypertensionhypotensive now  History of diastolic congestive heart failure  History of hypertrophic cardiomyopathy  Encephalopathy      PLAN:   1. As always, aggressive risk factor modification is strongly recommended. We should adhere to the JNC VIII guidelines for HTN management and the NCEP ATP III guidelines for LDL-C management. 2. ICU supportive care and management  3. Wean sedation and vent off as tolerated  4. Coronary evaluation in future when feasible  5. Monitor on telemetry  6. Keep potassium greater than 4, magnesium greater than 2  7. GI/DVT prophylaxis  8. Monitor H&H  9. Resume cardioprotective medications when blood pressure tolerates  10.  Avoid any nephrotoxic agents      Electronically signed by Ruthy Wong DO on 3/21/2021 at 12:51 PM

## 2021-03-21 NOTE — PROGRESS NOTES
Shift summary:    Handoff of care at bedside. IV fluids and ventilator settings verified. Restraints checked for patient safety. Sanchez draining yellow urine. Skin checked for integrity. Mepelex in place on the sacrum. Patient turned Q2 hours and oral care given per protocol. OG verified for placement. Tube feed residual noted at 100 cc on pullback. Patient assessed. Radial arterial line zeroed and is reading appropriately, leveled to the patient. Please see EMR for details. 0033 levophed titrated down to 2 mcg/min for desired effect. 0230 Levophed maintained at 2 mcg/min.   Unable to take Levophed off due to MAP staying at 60

## 2021-03-21 NOTE — PLAN OF CARE
Problem: Nutrition  Goal: Optimal nutrition therapy  Outcome: Ongoing     Problem: Non-Violent Restraints  Goal: Removal from restraints as soon as assessed to be safe  Outcome: Ongoing  Goal: No harm/injury to patient while restraints in use  Outcome: Ongoing  Goal: Patient's dignity will be maintained  Outcome: Ongoing     Problem: Skin Integrity:  Goal: Will show no infection signs and symptoms  Description: Will show no infection signs and symptoms  Outcome: Ongoing  Goal: Absence of new skin breakdown  Description: Absence of new skin breakdown  Outcome: Ongoing     Problem: Falls - Risk of:  Goal: Will remain free from falls  Description: Will remain free from falls  Outcome: Ongoing  Goal: Absence of physical injury  Description: Absence of physical injury  Outcome: Ongoing     Problem: Infection:  Goal: Will remain free from infection  Description: Will remain free from infection  Outcome: Ongoing     Problem: Safety:  Goal: Free from accidental physical injury  Description: Free from accidental physical injury  Outcome: Ongoing  Goal: Free from intentional harm  Description: Free from intentional harm  Outcome: Ongoing     Problem: Daily Care:  Goal: Daily care needs are met  Description: Daily care needs are met  Outcome: Ongoing     Problem: Pain:  Goal: Patient's pain/discomfort is manageable  Description: Patient's pain/discomfort is manageable  Outcome: Ongoing     Problem: Skin Integrity:  Goal: Skin integrity will stabilize  Description: Skin integrity will stabilize  Outcome: Ongoing     Problem: Discharge Planning:  Goal: Patients continuum of care needs are met  Description: Patients continuum of care needs are met  Outcome: Ongoing

## 2021-03-21 NOTE — PROGRESS NOTES
Neurology Follow up    SUBJECTIVE: Patient remains intubated on light sedation. She does awaken with name but does not follow any commands.   Sedation was discontinued for a brief period of time and she moves all extremities but did not follow commands according to the nurse    Current Facility-Administered Medications   Medication Dose Route Frequency Provider Last Rate Last Admin    insulin lispro (HUMALOG) injection vial 0-18 Units  0-18 Units Subcutaneous TID WC Regla Florence DO        insulin lispro (HUMALOG) injection vial 0-9 Units  0-9 Units Subcutaneous Nightly Regla Florence DO        pantoprazole (PROTONIX) injection 40 mg  40 mg Intravenous BID Kartik Solomon MD        And    sodium chloride (PF) 0.9 % injection 10 mL  10 mL Intravenous BID Kartik Solomon MD        morphine (PF) injection 2 mg  2 mg Intravenous Q2H PRN Maryfrances Melvin, DO        hydrALAZINE (APRESOLINE) injection 10 mg  10 mg Intravenous Q4H PRN Marlinda Loth Sedar, DO   10 mg at 03/19/21 0606    Or    hydrALAZINE (APRESOLINE) injection 20 mg  20 mg Intravenous Q4H PRN Marlinda Loth Sedar, DO        glucose (GLUTOSE) 40 % oral gel 15 g  15 g Oral PRN Marlinda Loth Sedar, DO        dextrose 50 % IV solution  12.5 g Intravenous PRN Marlinda Loth Sedar, DO   12.5 g at 03/20/21 0438    glucagon (rDNA) injection 1 mg  1 mg Intramuscular PRN Marlinda Loth Sedar, DO        dextrose 5 % solution  100 mL/hr Intravenous PRN Marlinda Loth Sedar, DO        labetalol (NORMODYNE;TRANDATE) injection 20 mg  20 mg Intravenous Q4H PRN Maryfrances Melvin, DO   20 mg at 03/19/21 1701    labetalol (NORMODYNE;TRANDATE) injection 40 mg  40 mg Intravenous Q4H PRN Maryfrances Melvin, DO   40 mg at 03/19/21 0827    propofol injection  5-50 mcg/kg/min Intravenous Titrated Regla Florence DO 17.1 mL/hr at 03/21/21 0037 40 mcg/kg/min at 03/21/21 0037    pantoprazole (PROTONIX) 80 mg in sodium chloride 0.9 % 50 mL bolus  80 mg Intravenous Once Shamar Walden MD   Stopped at 03/18/21 1820    promethazine (PHENERGAN) tablet 12.5 mg  12.5 mg Oral Q6H PRN Regla Jones MD        Or    ondansetron (ZOFRAN) injection 4 mg  4 mg Intravenous Q6H PRN Regla Jones MD        chlorhexidine (PERIDEX) 0.12 % solution 15 mL  15 mL Mouth/Throat BID Regla Jones MD   15 mL at 03/21/21 0754    sodium chloride flush 0.9 % injection 10 mL  10 mL Intravenous 2 times per day Regla Jones MD   10 mL at 03/21/21 0754    sodium chloride flush 0.9 % injection 10 mL  10 mL Intravenous PRN Regla Jones MD        polyethylene glycol (GLYCOLAX) packet 17 g  17 g Oral Daily PRN Regla Jones MD        acetaminophen (TYLENOL) tablet 650 mg  650 mg Oral Q6H PRN Regla Jones MD        Or    acetaminophen (TYLENOL) suppository 650 mg  650 mg Rectal Q6H PRN Regla Jones MD        ipratropium-albuterol (DUONEB) nebulizer solution 1 ampule  1 ampule Inhalation 4x daily Kia Newman MD   1 ampule at 03/21/21 1020       PHYSICAL EXAM:    BP (!) 86/43   Pulse 92   Temp 98.1 °F (36.7 °C) (Bladder)   Resp 20   Ht 4' 11\" (1.499 m)   Wt 147 lb 4.3 oz (66.8 kg)   LMP  (LMP Unknown)   SpO2 100%   BMI 29.74 kg/m²    General Appearance:      Skin:  normal  CVS - Normal sounds, No murmurs , No carotid Bruits  RS -CTA  Abdomen Soft, BS present  Review of Systems   Mental Status Exam:             She is lethargic due to sedation  Funduscopic Exam:     Cranial Nerves            Cranial nerve III           Pupils:  equal, round, reactive to light      Cranial nerves III, IV, VI           Extraocular Movements: intact        Motor: Unable to perform examination but she does withdraw all her extremities to pain            Sensory: Unable to perform        Pinprick             Right Upper Extremity:  normal             Left Upper Extremity:  normal             Right Lower Extremity:  normal             Left Lower Extremity:  normal           Vibration                         Touch            Proprioception Coordination:           Finger/Nose   Right:  normal              Left:  normal          Heel-Knee-Shin                Right:  normal              Left:  normal          Rapid Alternating Movements              Right:  normal              Left:  normal          Gait:                       Casual:  normal                         Romberg:  normal            Reflexes:             Deep Tendon Reflexes:             Reflexes are 2 +             Plantar response:                Right:  downgoing               Left:  downgoing    Vascular:  Cardiac Exam:  normal         Echocardiogram Complete 2d With Doppler With Color    Result Date: 3/19/2021  Transthoracic Echocardiography Report (TTE)  Demographics  Patient Name   Slim Bianchi  Gender              Female                 M  Patient Number [id number]        Race                Black                                 Ethnicity  Visit Number   [id number]       Room Number         IC05  Corporate ID                   Date of Study       03/19/2021  Accession      [id number]      Referring Physician  Number  Date of Birth  1945      Sonographer         Sada Dover LPN  Age            67 year(s)      Interpreting        [facility]) Cardiology                                 Physician           Yoli Jorgensen MD Procedure Type of Study  TTE procedure:ECHO COMPLETE 2D W/DOP W/COLOR. Procedure Date Date: 03/19/2021 Start: 09:15 AM Study Location: Portable Technical Quality: Good visualization Indications:Cardiac arrest. Patient Status: Routine Weight: 143 pounds BP: 89/48 mmHg Allergies   - Other allergy:(Darvon). Conclusions  Summary  Mitral annular calcification is present. 1-2+ MR  Normal tricuspid valve structure and function. 1+ TR  RVSP 31 mmHg  severe CLV[facility]  LVEF 50%  E/A flow reversal noted. Suggestive of diastolic dysfunction.   Signature ----------------------------------------------------------------  Electronically signed by Hellen Ochoa MD(Interpreting  physician) on 03/19/2021 02:13 PM  ----------------------------------------------------------------  Findings Left Ventricle severe [facility] LVEF 50% E/A flow reversal noted. Suggestive of diastolic dysfunction. Right Ventricle Normal right ventricle structure and function. Normal right ventricle systolic pressure. Left Atrium Moderately dilated left atrium. Right Atrium Normal right atrium. Mitral Valve Mitral annular calcification is present. 1-2+ MR Tricuspid Valve Normal tricuspid valve structure and function. 1+ TR RVSP 31 mmHg Aortic Valve Normal aortic valve structure and function. Pulmonic Valve The pulmonic valve was not well visualized . Pericardial Effusion No evidence of pericardial effusion. Pleural Effusion No evidence of pleural effusion. Aorta \ Miscellaneous The aorta is within normal limits. M-Mode Measurements (cm)  LVIDd: 4.01 cm                         LVIDs: 1.85 cm  IVSd: 1.45 cm                          IVSs: 1.23 cm  LVPWd: 1.32 cm                         LVPWs: 1.61 cm  Rt. Vent.  Dimension: 1.07 cm           AO Root Dimension: 2.8 cm                                         ACS: 1.47 cm                                         LA: 3.04 cm                                         LVOT: 1.98 cm Doppler Measurements:  AV Velocity:0.01 m/s                   MV Peak E-Wave: 0.96 m/s  AV Peak Gradient: 7.73 mmHg            MV Peak A-Wave: 1.58 m/s  AV Mean Gradient: 3.01 mmHg  AV Area (Continuity):1.27 cm^2         MV P1/2t: 105.6 msec  TR Velocity:2.29 m/s                   MVA by PHT2.08 cm^2  TR Gradient:21.01 mmHg                 Estimated RAP:10 mmHg                                         RVSP:31 mmHg Valves  Mitral Valve  Peak E-Wave: 0.96 m/s          Peak A-Wave: 1.58 m/s  P1/2t: 105.6 msec              E/A Ratio: 0.61                                 Peak Gradient: 3.7 mmHg Area (PHT): 2.08 cm^2          Deceleration Time: 349.6 msec  MR Velocity: 4.66 m/s  Aortic Valve  Peak Velocity: 1.39 m/s                Mean Velocity: 0.79 m/s  Peak Gradient: 7.73 mmHg               Mean Gradient: 3.01 mmHg  Area (continuity): 1.27 cm^2  AV VTI: 28.28 cm  Cusp Separation: 1.47 cm  Tricuspid Valve  Estimated RVSP: 31 mmHg              Estimated RAP: 10 mmHg  TR Velocity: 2.29 m/s                TR Gradient: 21.01 mmHg  Pulmonic Valve  Peak Velocity: 0.68 m/s           Peak Gradient: 1.86 mmHg                                    Estimated PASP: 31.01 mmHg  LVOT  Peak Velocity: 0.72 m/s              Mean Velocity: 0.41 m/s  Peak Gradient: 2 mmHg                Mean Gradient: 0.9 mmHg  LVOT Diameter: 1.98 cm               LVOT VTI: 11.64 cm Structures  Left Atrium  LA Dimension: 3.04 cm                 LA Area: 18.08 cm^2  LA/Aorta: 1.09  LA Volume/Index: 49.97 ml  Left Ventricle  Diastolic Dimension: 8.85 cm         Systolic Dimension: 9.89 cm  Septum Diastolic: 6.55 cm            Septum Systolic: 8.71 cm  PW Diastolic: 7.48 cm                PW Systolic: 5.26 cm                                       FS: 53.9 %  LV EDV/LV EDV Index: 70.29 ml        LV ESV/LV ESV Index: 10.47 ml  EF Calculated: 85.1 %  LVOT Diameter: 1.98 cm  Right Ventricle  Diastolic Dimension: 5.01 cm                                    RV Systolic Pressure: 09.87 mmHg Aorta/ Miscellaneous Aorta  Aortic Root: 2.8 cm  LVOT Diameter: 1.98 cm    Xr Chest Portable    Result Date: 3/19/2021  EXAMINATION: XR CHEST PORTABLE CLINICAL HISTORY: INTUBATION COMPARISONS: OCTOBER 9, 2020 FINDINGS: Endotracheal tube has migrated 3 cm inferiorly, just lying cephalad to mojgan. Nasogastric tube courses beneath diaphragm. Osseous structures intact. Cardiopericardial silhouette normal. Aorta calcified. Ill-defined area increased opacity right  lung base. RIGHT LOWER LUNG SUBSEGMENTAL ATELECTASIS/PNEUMONIA.  INTERVAL INFERIOR MIGRATION ENDOTRACHEAL TUBE 3 CM WITH TIP NOW JUST PROXIMAL TO MOJGAN. MAY WISH TO RETRACT ENDOTRACHEAL TUBE 2 CM. Xr Chest Portable    Result Date: 3/19/2021  EXAMINATION: XR CHEST PORTABLE CLINICAL HISTORY: RESPIRATORY FAILURE COMPARISONS: MARCH 16, 2021 FINDINGS: Endotracheal tube tip 2.3 cm superior to mojgan. Nasogastric tube courses beneath diaphragm area osseous structures intact. Ill-defined areas increase opacity at lung bases. Cardiopericardial silhouette normal.     BIBASILAR SUBSEGMENTAL ATELECTASIS/PNEUMONIA IN THIS PARTIALLY EXPIRATORY CHEST RADIOGRAPH. Recent Labs     03/19/21  0556 03/19/21  0556 03/20/21  0500 03/20/21  0530 03/21/21  0528   WBC 15.4*  --  11.6*  --  10.6   HGB 10.8*   < > 9.8* 10.5* 8.6*     --  276  --  272    < > = values in this interval not displayed. Recent Labs     03/20/21  0033 03/20/21  0500 03/20/21  0530 03/21/21  0528   * 143  --  140   K 4.6 4.6  --  4.0   * 111*  --  108*   CO2 22 24  --  23   BUN 37* 40*  --  37*   CREATININE 2.05* 2.07* 2.2* 2.04*   GLUCOSE 74 72  --  213*     Recent Labs     03/19/21  0557 03/20/21  0500 03/21/21  0528   BILITOT 0.7 0.4 <0.2   ALKPHOS 124 102 93   * 196* 90*   * 254* 164*     Lab Results   Component Value Date    PROTIME 16.5 03/21/2021    INR 1.3 03/21/2021     No results found for: LITHIUM, DILFRTOT, VALPROATE    ASSESSMENT AND PLAN  Encephalopathy secondary to cardiorespiratory arrest.  Patient may have some hypoxemic damage though this very difficult to certain as patient is sedated and intubated. We will keep an eye on this repeat CT scan did not anything significant. We will try and continue to lighten her sedation to reexamine. For now there is a nonfocal examination    Critical care time 35 minutes    Mehul See MD, Stefanie Fisher, American Board of Psychiatry & Neurology  Board Certified in Vascular Neurology  Board Certified in Neuromuscular Medicine  Certified in [place]

## 2021-03-21 NOTE — FLOWSHEET NOTE
0830- propofol stopped     1000- patient not waking up or following commands    1145- patient woke up following commands with bilateral hand grasps. And moving bilateral feet upon commands. Patient also nodding head appropriately.

## 2021-03-21 NOTE — PROGRESS NOTES
Pulmonary & Critical Care Medicine ICU Progress Note    Subjective:     No overnight events reported. Her  is currently at the bedside. Her sedation has been off for approximately 1 hour. She is not following commands as well as she was yesterday. Her blood pressure remained stable.     EXAM:  General: No acute distress  HEENT: Normocephalic, atraumatic, oral ETT and OG in place, evidence of recent broken tooth on the upper right  Lungs : Diminished but clear, no wheezes, no rhonchi, no ventilator dyssynchrony  Heart: Regular rate and rhythm  ABD: Obese, positive bowel sounds, soft, nontender to palpation, no guarding or rigidity  Extremities : Warm, dry, mild edema noted  Neuro: Positive cough, positive gag, no spontaneous movement at this time, initiates breaths over the ventilator  Skin: No rashes    MV Settings:  Vent Mode: AC/VC Rate Set: 16 bmp/Vt Ordered: 350 mL/ /FiO2 : 40 %     Recent Labs     03/19/21  1309 03/20/21  0530   PHART 7.391 7.354   FUW6CMH 40 42   PO2ART 119* 67*         IV:   norepinephrine Stopped (03/21/21 0804)    dextrose      propofol 40 mcg/kg/min (03/21/21 0037)    pantoprozole (PROTONIX) infusion 8 mg/hr (03/21/21 0038)    lactated ringers Stopped (03/19/21 0410)       Vitals:  BP (!) 86/43   Pulse 92   Temp 98.1 °F (36.7 °C) (Bladder)   Resp 20   Ht 4' 11\" (1.499 m)   Wt 147 lb 4.3 oz (66.8 kg)   LMP  (LMP Unknown)   SpO2 100%   BMI 29.74 kg/m²          Intake/Output Summary (Last 24 hours) at 3/21/2021 0917  Last data filed at 3/21/2021 0546  Gross per 24 hour   Intake 1195 ml   Output 950 ml   Net 245 ml       Medications:  Scheduled Meds:   insulin lispro  0-6 Units Subcutaneous 6 times per day    pantoprazole (PROTONIX) bolus  80 mg Intravenous Once    chlorhexidine  15 mL Mouth/Throat BID    sodium chloride flush  10 mL Intravenous 2 times per day    ipratropium-albuterol  1 ampule Inhalation 4x daily       Labs:   CBC:   Recent Labs     03/19/21  0540 03/19/21  0556 03/20/21  0500 03/20/21  0530 03/21/21  0528   WBC 15.4*  --  11.6*  --  10.6   HGB 10.8*   < > 9.8* 10.5* 8.6*   HCT 33.5*  --  30.2*  --  27.1*   MCV 93.9  --  93.8  --  93.1     --  276  --  272    < > = values in this interval not displayed. BMP:   Recent Labs     03/20/21  0033 03/20/21  0500 03/20/21  0530 03/21/21  0528   * 143  --  140   K 4.6 4.6  --  4.0   * 111*  --  108*   CO2 22 24  --  23   PHOS 6.0* 6.4*  --  4.7   BUN 37* 40*  --  37*   CREATININE 2.05* 2.07* 2.2* 2.04*     LIVER PROFILE:   Recent Labs     03/19/21  0557 03/20/21  0500 03/21/21  0528   * 196* 90*   * 254* 164*   BILITOT 0.7 0.4 <0.2   ALKPHOS 124 102 93     PT/INR:   Recent Labs     03/19/21  0600 03/20/21  0500 03/21/21  0528   PROTIME 16.2* 15.7* 16.5*   INR 1.3 1.2 1.3     APTT:   Recent Labs     03/19/21  0600 03/20/21  0500 03/21/21  0528   APTT 31.9 37.3* 42.1*     UA:No results for input(s): NITRITE, COLORU, PHUR, LABCAST, WBCUA, RBCUA, MUCUS, TRICHOMONAS, YEAST, BACTERIA, CLARITYU, SPECGRAV, LEUKOCYTESUR, UROBILINOGEN, BILIRUBINUR, BLOODU, GLUCOSEU, AMORPHOUS in the last 72 hours. Invalid input(s): KETONESU      Assessment/Plan:    1. Acute respiratory failure-she was continue on the support of the ventilator. She is stable from an oxygenation and ventilation standpoint. We will continue with current ventilatory support until the patient's neurologic status has been able to be better assessed. 2. Cardiac arrest with successful ROSC-she was initially started on the cooling protocol, however around 5 PM Friday she was found to be following commands. Her cooling protocol was stopped at that time. Neurology has been consulted by the hospitalist service. She did have a CT scan of her head performed yesterday.   Cardiology does continue to follow given the patient's cardiac arrest.  She does have a known history of underlying disease and does follow with them routinely as an outpatient. .  She does remain a DNR CCA at this time. 3. Shock-resolved. This was likely cardiogenic in nature. Given that her blood pressure has been more stable, her arterial line will be removed. 4. Acute kidney injury on chronic kidney disease-her creatinine has remained stable this morning. Continue to monitor her I's and O's. Nephrology does continue to follow. 5. Acute on chronic anemia-patient does have a component of chronic anemia. She was reportedly noted to have hemoglobin of 5 in the emergency department. Gastroenterology did consult and evaluate the patient. She does have a known history of small bowel AVMs that are not able to be treated given their location. Continue with management per gastroenterology. Transfuse as needed to keep hemoglobin above 7. She does remain on a Protonix drip at this time. 6. Diabetes mellitus -her insulin drip has since been discontinued. She currently is receiving sliding scale insulin. Nutrition: Tube feeds    Prophylaxis: SCDs for DVT prophylaxis given anemia upon presentation. Protonix drip in place and no additional stress ulcer prophylaxis is needed at this time. Code Status: DNR CCA    This is a 67 y.o. female who is critically ill secondary to cardiac arrest and respiratory failure. I have spent a total of 34 minutes of critical care time with this patient, review of the chart, and discussion with the bedside staff; excluding any billable procedures.     Electronically signed by Alec Sanz DO, on 3/21/2021 at 9:17 AM

## 2021-03-21 NOTE — PROGRESS NOTES
Nephrology Progress Note    Assessment:  CKD-4 stable  S/P cardiac arest  Vntilator dependent  DM type-2  Hypertension Hx   Encephalopathy  hypoxia      Plan:no dialysis needs at present  Avoid hypotension    Patient Active Problem List:     Hypertension     Hyperlipidemia     Uncontrolled type 2 diabetes mellitus with complication, without long-term current use of insulin (HCC)     Fibromyalgia     Anxiety     Smoker     Insomnia     DJD (degenerative joint disease), lumbar     Lumbosacral radiculopathy at S1     DDD (degenerative disc disease), lumbar     Dysphonia     CTS (carpal tunnel syndrome)     High risk medication use-Norco - 12/20/17 [facility] PM&R, 02/20/18 [facility] PM&R, 03/07/18 [facility] PM&R, Urine Drug screen negative 02/06/17 PM&R--MED CONTRACT 2/6/17     SOB (shortness of breath) on exertion     Chest pain     Memory deficit     Artificial lens present     Presbyopia     Astigmatism, regular     Cataract, nuclear sclerotic senile     IDDM (insulin dependent diabetes mellitus)     Regular astigmatism     Nuclear senile cataract     Neck pain     Lumbosacral radiculopathy at L5     Spinal stenosis of lumbar region with neurogenic claudication     Impaired mobility and activities of daily living     Non-compliant patient NO showed FU 1/10/17 Dr Naresh Esparza     Insomnia secondary to chronic pain     Reactive depression     Diabetic radiculopathy (HCC)     Cervical radicular pain     Diabetic asymmetric polyneuropathy (HCC)     Myalgia     Intercostal neuropathy     Osteoarthritis of spine with radiculopathy, lumbar region     Acute combined systolic and diastolic CHF, NYHA class 1 (HCC)     PMB (postmenopausal bleeding)     Acute on chronic diastolic heart failure (HCC)     CHF (congestive heart failure) ([address]     Hypertensive urgency     Uncontrolled type 2 diabetes mellitus with hyperglycemia ([address]     Chronic obstructive pulmonary disease with acute exacerbation (HCC)     Acute on chronic diastolic (congestive) heart failure (HCC)     SAÚL (acute kidney injury) ([facility]     Hypoglycemia     HOCM (hypertrophic obstructive cardiomyopathy) (HCC)     Gastrointestinal hemorrhage     COPD exacerbation (HCC)     Anemia     AVM (arteriovenous malformation)     Symptomatic anemia     Flash pulmonary edema (HCC)     Acute on chronic kidney failure (HCC)     Dysarthria     Chronic fatigue     Acute encephalopathy     Adenomatous polyp of sigmoid colon     Adenomatous polyp of transverse colon     Sepsis (HCC)     Major depressive disorder in remission ([facility]     Gastroesophageal reflux disease without esophagitis     Acute cystitis without hematuria     CKD (chronic kidney disease) stage 4, GFR 15-29 ml/min (HCC)     Cardiopulmonary arrest (HCC)     Gait abnormality     Late effects of CVA (cerebrovascular accident)     Cardiac arrest ([facility]      Subjective:  Admit Date: 3/18/2021    Interval History: on ventilator  sedated    Medications:  Scheduled Meds:   insulin lispro  0-18 Units Subcutaneous TID WC    insulin lispro  0-9 Units Subcutaneous Nightly    pantoprazole  40 mg Intravenous BID    And    sodium chloride (PF)  10 mL Intravenous BID    pantoprazole (PROTONIX) bolus  80 mg Intravenous Once    chlorhexidine  15 mL Mouth/Throat BID    sodium chloride flush  10 mL Intravenous 2 times per day    ipratropium-albuterol  1 ampule Inhalation 4x daily     Continuous Infusions:   dextrose      propofol 40 mcg/kg/min (03/21/21 0037)       CBC:   Recent Labs     03/20/21  0500 03/20/21  0530 03/21/21  0528   WBC 11.6*  --  10.6   HGB 9.8* 10.5* 8.6*     --  272     CMP:    Recent Labs     03/20/21  0033 03/20/21  0500 03/20/21  0530 03/21/21  0528   * 143  --  140   K 4.6 4.6  --  4.0   * 111*  --  108*   CO2 22 24  --  23   BUN 37* 40*  --  37*   CREATININE 2.05* 2.07* 2.2* 2.04*   GLUCOSE 74 72  --  213*   CALCIUM 8.8 8.9  --  8.3*   LABGLOM 23.5* 23.2* 22* 23.6*     Troponin:   Recent Labs     03/19/21  0557 TROPONINI 0.056*     BNP: No results for input(s): BNP in the last 72 hours. INR:   Recent Labs     03/21/21  0528   INR 1.3     Lipids: No results for input(s): CHOL, LDLDIRECT, TRIG, HDL, AMYLASE, LIPASE in the last 72 hours. Liver:   Recent Labs     03/21/21  0528   AST 90*   *   ALKPHOS 93   PROT 5.1*   LABALBU 2.7*   BILITOT <0.2     Iron:  No results for input(s): IRONS, FERRITIN in the last 72 hours. Invalid input(s): LABIRONS  Urinalysis: No results for input(s): UA in the last 72 hours.     Objective:  Vitals: BP (!) 86/43   Pulse 92   Temp 98.1 °F (36.7 °C) (Bladder)   Resp 20   Ht 4' 11\" (1.499 m)   Wt 147 lb 4.3 oz (66.8 kg)   LMP  (LMP Unknown)   SpO2 100%   BMI 29.74 kg/m²    Wt Readings from Last 3 Encounters:   03/21/21 147 lb 4.3 oz (66.8 kg)   01/15/21 131 lb (59.4 kg)   11/27/20 120 lb (54.4 kg)      24HR INTAKE/OUTPUT:      Intake/Output Summary (Last 24 hours) at 3/21/2021 1041  Last data filed at 3/21/2021 0546  Gross per 24 hour   Intake 1195 ml   Output 950 ml   Net 245 ml       General: alert, in no apparent distress  HEENT: normocephalic, atraumatic, anicteric  Neck: supple, no mass  Lungs: non-labored respirations, clear to auscultation bilaterally  Heart: regular rate and rhythm, no murmurs or rubs  Abdomen: soft, non-tender, non-distended  Ext: no cyanosis, no peripheral edema  Neuro: alert and oriented, no gross abnormalities  Psych: normal mood and affect  Skin: no rash      Electronically signed by Jacquie Shoemaker MD

## 2021-03-21 NOTE — PROGRESS NOTES
Hospitalist Progress Note      PCP: Zhang Sarmiento MD    Date of Admission: 3/18/2021    Chief Complaint:  Patient remains intubated, on mechanical ventilation, sedated with propofol, off norepinephrine infusion,     Medications:  Reviewed    Infusion Medications    dextrose      propofol 40 mcg/kg/min (03/21/21 0037)     Scheduled Medications    insulin lispro  0-18 Units Subcutaneous TID WC    insulin lispro  0-9 Units Subcutaneous Nightly    pantoprazole  40 mg Intravenous BID    And    sodium chloride (PF)  10 mL Intravenous BID    pantoprazole (PROTONIX) bolus  80 mg Intravenous Once    chlorhexidine  15 mL Mouth/Throat BID    sodium chloride flush  10 mL Intravenous 2 times per day    ipratropium-albuterol  1 ampule Inhalation 4x daily     PRN Meds: morphine, hydrALAZINE **OR** hydrALAZINE, glucose, dextrose, glucagon (rDNA), dextrose, labetalol, labetalol, promethazine **OR** ondansetron, sodium chloride flush, polyethylene glycol, acetaminophen **OR** acetaminophen      Intake/Output Summary (Last 24 hours) at 3/21/2021 1002  Last data filed at 3/21/2021 0546  Gross per 24 hour   Intake 1195 ml   Output 950 ml   Net 245 ml       Exam:    BP (!) 86/43   Pulse 92   Temp 98.1 °F (36.7 °C) (Bladder)   Resp 20   Ht 4' 11\" (1.499 m)   Wt 147 lb 4.3 oz (66.8 kg)   LMP  (LMP Unknown)   SpO2 100%   BMI 29.74 kg/m²     General appearance: intubated, sedated  Lungs: clear to auscultation bilaterally  Heart: S1S2,RRR  Abdomen: soft, BS active  Extremities: no edema    Labs:   Recent Labs     03/19/21  0556 03/19/21  0556 03/20/21  0500 03/20/21  0530 03/21/21  0528   WBC 15.4*  --  11.6*  --  10.6   HGB 10.8*   < > 9.8* 10.5* 8.6*   HCT 33.5*  --  30.2*  --  27.1*     --  276  --  272    < > = values in this interval not displayed.      Recent Labs     03/20/21  0033 03/20/21  0500 03/20/21  0530 03/21/21  0528   * 143  --  140   K 4.6 4.6  --  4.0   * 111*  --  108*   CO2 22 24 --  23   BUN 37* 40*  --  37*   CREATININE 2.05* 2.07* 2.2* 2.04*   CALCIUM 8.8 8.9  --  8.3*   PHOS 6.0* 6.4*  --  4.7     Recent Labs     03/19/21  0557 03/20/21  0500 03/21/21  0528   * 196* 90*   * 254* 164*   BILITOT 0.7 0.4 <0.2   ALKPHOS 124 102 93     Recent Labs     03/19/21  0600 03/20/21  0500 03/21/21  0528   INR 1.3 1.2 1.3     Recent Labs     03/18/21  1615 03/18/21  2300 03/19/21  0557   TROPONINI 0.048* 0.067* 0.056*       Urinalysis:      Lab Results   Component Value Date    NITRU Negative 10/18/2020    WBCUA 6-9 10/18/2020    BACTERIA Negative 10/18/2020    RBCUA 3-5 10/18/2020    BLOODU SMALL 10/18/2020    SPECGRAV 1.009 10/18/2020    GLUCOSEU Negative 10/18/2020       Radiology:  CT HEAD WO CONTRAST   Final Result   Impression:      Mild cerebral atrophy. Chronic ischemic white matter disease. Remote left thalamic lacunar infarct. Bilateral ethmoid and sphenoid sinusitis. All CT scans at this facility use dose modulation, iterative reconstruction, and/or weight based dosing when appropriate to reduce radiation dose to as low as reasonably achievable. XR CHEST PORTABLE   Final Result   INTERVAL RETRACTION OF THE ENDOTRACHEAL TUBE IN THE TRACHEA. CONSIDER RETRACTING ENDOTRACHEAL TUBE 1 CM. LEFT LOWER LUNG ATELECTASIS. XR CHEST PORTABLE   Final Result      CHEST ENDOTRACHEAL TUBE IN RIGHT MAINSTEM BRONCHUS. RECOMMENDING RETRACTING ENDOTRACHEAL TUBE 7 CM. INTERVAL DEVELOPMENT OF LEFT MID AND LEFT LOWER LUNG ATELECTASIS. INTERVAL RESOLUTION OF RIGHT LOWER LUNG ATELECTASIS. ABOVE FINDINGS DISCUSSED WITH PATIENT'S NURSE IGNACIO, VIA TELEPHONE, IN THE ICU, AT [address] [place] [address], MARCH 20, 2021. XR CHEST PORTABLE   Final Result   BIBASILAR SUBSEGMENTAL ATELECTASIS/PNEUMONIA IN THIS PARTIALLY EXPIRATORY CHEST RADIOGRAPH. XR CHEST PORTABLE   Final Result   RIGHT LOWER LUNG SUBSEGMENTAL ATELECTASIS/PNEUMONIA.       INTERVAL INFERIOR MIGRATION ENDOTRACHEAL TUBE 3 CM WITH TIP NOW JUST PROXIMAL TO FAISAL. MAY WISH TO RETRACT ENDOTRACHEAL TUBE 2 CM.               Assessment/Plan:    67 y.o. female with PMH of nonobstructive CAD, diastolic HF, HOCM, HTN, IDDM2, CKD3, anemia, prolonged hospitalization in 98/3294 complicated by cardiac arrest s/p ROSC, SAÚL,septic shock who presented with:     Cardiac arrest s/p ROSC   - required Dopamine, norepinephrine and epinephrine infusion  - off pressors and hypothermia protocol  - TTE showed LVEF of 50%  - cardiology following     Acute hypoxic respiratory failure  - requiring intubation and mechanical ventilation  - management per critical care service    Acute encephalopathy  - likely hypoxic-ischemic etiology from cardiac arrest  - CT head was negative per report  - neurology following     Acute / chronic anemia   - with Hb of 5.8 on arrival, FOBT was positive  - improved s/p PRBCs given in ED  - on Protonix   - follow H/H      Lactic acidosis  - in the setting of cardiac arrest  - improved with volume repletion     Hyperkalemia  - resolved     SAÚL/CKD3  - nephrology following     IDDM2 with hyperglycemia  - improved, off insulin drip, on ISS     Leukocytosis   - improved    Code status - [facility]        Electronically signed by Darren Mayberry MD on 3/21/2021 at 10:02 AM

## 2021-03-22 ENCOUNTER — APPOINTMENT (OUTPATIENT)
Dept: GENERAL RADIOLOGY | Age: 76
DRG: 296 | End: 2021-03-22
Payer: MEDICARE

## 2021-03-22 LAB
ALBUMIN SERPL-MCNC: 2.8 G/DL (ref 3.5–4.6)
ALP BLD-CCNC: 97 U/L (ref 40–130)
ALT SERPL-CCNC: 110 U/L (ref 0–33)
ANION GAP SERPL CALCULATED.3IONS-SCNC: 9 MEQ/L (ref 9–15)
APTT: 38 SEC (ref 24.4–36.8)
AST SERPL-CCNC: 46 U/L (ref 0–35)
BASE EXCESS ARTERIAL: 1 (ref -3–3)
BASOPHILS ABSOLUTE: 0 K/UL (ref 0–0.2)
BASOPHILS RELATIVE PERCENT: 0.2 %
BILIRUB SERPL-MCNC: <0.2 MG/DL (ref 0.2–0.7)
BUN BLDV-MCNC: 31 MG/DL (ref 8–23)
CALCIUM IONIZED, CALC AT PH 7.4: 1.22 MMOL/L (ref 1.11–1.3)
CALCIUM IONIZED, CALC AT PH 7.4: 1.25 MMOL/L (ref 1.11–1.3)
CALCIUM IONIZED: 1.24 MMOL/L (ref 1.11–1.3)
CALCIUM IONIZED: 1.25 MMOL/L (ref 1.11–1.3)
CALCIUM SERPL-MCNC: 8.5 MG/DL (ref 8.5–9.9)
CHLORIDE BLD-SCNC: 111 MEQ/L (ref 95–107)
CO2: 25 MEQ/L (ref 20–31)
CREAT SERPL-MCNC: 1.88 MG/DL (ref 0.5–0.9)
EOSINOPHILS ABSOLUTE: 0.1 K/UL (ref 0–0.7)
EOSINOPHILS RELATIVE PERCENT: 1.1 %
GFR AFRICAN AMERICAN: 31.4
GFR NON-AFRICAN AMERICAN: 26
GLOBULIN: 2.8 G/DL (ref 2.3–3.5)
GLUCOSE BLD-MCNC: 171 MG/DL (ref 60–115)
GLUCOSE BLD-MCNC: 175 MG/DL (ref 60–115)
GLUCOSE BLD-MCNC: 185 MG/DL (ref 70–99)
GLUCOSE BLD-MCNC: 240 MG/DL (ref 60–115)
HCO3 ARTERIAL: 25.7 MMOL/L (ref 21–29)
HCT VFR BLD CALC: 27.2 % (ref 37–47)
HEMOGLOBIN: 8.7 G/DL (ref 12–16)
INR BLD: 1.3
LACTATE: 0.77 MMOL/L (ref 0.4–2)
LYMPHOCYTES ABSOLUTE: 0.7 K/UL (ref 1–4.8)
LYMPHOCYTES RELATIVE PERCENT: 7.5 %
MAGNESIUM: 2.9 MG/DL (ref 1.7–2.4)
MCH RBC QN AUTO: 30.4 PG (ref 27–31.3)
MCHC RBC AUTO-ENTMCNC: 32 % (ref 33–37)
MCV RBC AUTO: 95.2 FL (ref 82–100)
MONOCYTES ABSOLUTE: 0.7 K/UL (ref 0.2–0.8)
MONOCYTES RELATIVE PERCENT: 7.8 %
NEUTROPHILS ABSOLUTE: 8 K/UL (ref 1.4–6.5)
NEUTROPHILS RELATIVE PERCENT: 83.4 %
O2 SAT, ARTERIAL: 97 % (ref 93–100)
PCO2 ARTERIAL: 44 MM HG (ref 35–45)
PDW BLD-RTO: 18.2 % (ref 11.5–14.5)
PERFORMED ON: ABNORMAL
PH ARTERIAL: 7.37 (ref 7.35–7.45)
PHOSPHORUS: 3.1 MG/DL (ref 2.3–4.8)
PLATELET # BLD: 236 K/UL (ref 130–400)
PO2 ARTERIAL: 91 MM HG (ref 75–108)
POC FIO2: 35
POC SAMPLE TYPE: ABNORMAL
POTASSIUM SERPL-SCNC: 3.9 MEQ/L (ref 3.4–4.9)
PROTHROMBIN TIME: 16.2 SEC (ref 12.3–14.9)
RBC # BLD: 2.86 M/UL (ref 4.2–5.4)
SODIUM BLD-SCNC: 145 MEQ/L (ref 135–144)
TCO2 ARTERIAL: 27 (ref 22–29)
TOTAL PROTEIN: 5.6 G/DL (ref 6.3–8)
WBC # BLD: 9.6 K/UL (ref 4.8–10.8)

## 2021-03-22 PROCEDURE — 2500000003 HC RX 250 WO HCPCS

## 2021-03-22 PROCEDURE — 2500000003 HC RX 250 WO HCPCS: Performed by: INTERNAL MEDICINE

## 2021-03-22 PROCEDURE — 36600 WITHDRAWAL OF ARTERIAL BLOOD: CPT

## 2021-03-22 PROCEDURE — 6360000002 HC RX W HCPCS: Performed by: INTERNAL MEDICINE

## 2021-03-22 PROCEDURE — C9113 INJ PANTOPRAZOLE SODIUM, VIA: HCPCS | Performed by: INTERNAL MEDICINE

## 2021-03-22 PROCEDURE — 94003 VENT MGMT INPAT SUBQ DAY: CPT

## 2021-03-22 PROCEDURE — 2580000003 HC RX 258: Performed by: INTERNAL MEDICINE

## 2021-03-22 PROCEDURE — 94640 AIRWAY INHALATION TREATMENT: CPT

## 2021-03-22 PROCEDURE — 99233 SBSQ HOSP IP/OBS HIGH 50: CPT | Performed by: PSYCHIATRY & NEUROLOGY

## 2021-03-22 PROCEDURE — 84100 ASSAY OF PHOSPHORUS: CPT

## 2021-03-22 PROCEDURE — 85730 THROMBOPLASTIN TIME PARTIAL: CPT

## 2021-03-22 PROCEDURE — 71045 X-RAY EXAM CHEST 1 VIEW: CPT

## 2021-03-22 PROCEDURE — 85610 PROTHROMBIN TIME: CPT

## 2021-03-22 PROCEDURE — 6370000000 HC RX 637 (ALT 250 FOR IP): Performed by: INTERNAL MEDICINE

## 2021-03-22 PROCEDURE — 85025 COMPLETE CBC W/AUTO DIFF WBC: CPT

## 2021-03-22 PROCEDURE — 89220 SPUTUM SPECIMEN COLLECTION: CPT

## 2021-03-22 PROCEDURE — 2700000000 HC OXYGEN THERAPY PER DAY

## 2021-03-22 PROCEDURE — 82948 REAGENT STRIP/BLOOD GLUCOSE: CPT

## 2021-03-22 PROCEDURE — 94660 CPAP INITIATION&MGMT: CPT

## 2021-03-22 PROCEDURE — 94761 N-INVAS EAR/PLS OXIMETRY MLT: CPT

## 2021-03-22 PROCEDURE — 82803 BLOOD GASES ANY COMBINATION: CPT

## 2021-03-22 PROCEDURE — 99291 CRITICAL CARE FIRST HOUR: CPT | Performed by: INTERNAL MEDICINE

## 2021-03-22 PROCEDURE — 99233 SBSQ HOSP IP/OBS HIGH 50: CPT | Performed by: INTERNAL MEDICINE

## 2021-03-22 PROCEDURE — 31500 INSERT EMERGENCY AIRWAY: CPT

## 2021-03-22 PROCEDURE — 82330 ASSAY OF CALCIUM: CPT

## 2021-03-22 PROCEDURE — 92950 HEART/LUNG RESUSCITATION CPR: CPT

## 2021-03-22 PROCEDURE — 83605 ASSAY OF LACTIC ACID: CPT

## 2021-03-22 PROCEDURE — 83735 ASSAY OF MAGNESIUM: CPT

## 2021-03-22 PROCEDURE — 94799 UNLISTED PULMONARY SVC/PX: CPT

## 2021-03-22 PROCEDURE — 80053 COMPREHEN METABOLIC PANEL: CPT

## 2021-03-22 PROCEDURE — 6360000002 HC RX W HCPCS

## 2021-03-22 PROCEDURE — 31500 INSERT EMERGENCY AIRWAY: CPT | Performed by: INTERNAL MEDICINE

## 2021-03-22 PROCEDURE — 2000000000 HC ICU R&B

## 2021-03-22 RX ORDER — ETOMIDATE 2 MG/ML
0.3 INJECTION INTRAVENOUS ONCE
Status: COMPLETED | OUTPATIENT
Start: 2021-03-22 | End: 2021-03-22

## 2021-03-22 RX ORDER — LACTULOSE 10 G/15ML
30 SOLUTION ORAL ONCE
Status: COMPLETED | OUTPATIENT
Start: 2021-03-22 | End: 2021-03-22

## 2021-03-22 RX ORDER — METHYLPREDNISOLONE SODIUM SUCCINATE 125 MG/2ML
INJECTION, POWDER, LYOPHILIZED, FOR SOLUTION INTRAMUSCULAR; INTRAVENOUS
Status: COMPLETED
Start: 2021-03-22 | End: 2021-03-22

## 2021-03-22 RX ORDER — HEPARIN SODIUM 5000 [USP'U]/ML
5000 INJECTION, SOLUTION INTRAVENOUS; SUBCUTANEOUS EVERY 8 HOURS SCHEDULED
Status: DISCONTINUED | OUTPATIENT
Start: 2021-03-22 | End: 2021-04-02 | Stop reason: HOSPADM

## 2021-03-22 RX ORDER — FENTANYL CITRATE 50 UG/ML
50 INJECTION, SOLUTION INTRAMUSCULAR; INTRAVENOUS ONCE
Status: COMPLETED | OUTPATIENT
Start: 2021-03-22 | End: 2021-03-22

## 2021-03-22 RX ORDER — METOPROLOL TARTRATE 5 MG/5ML
5 INJECTION INTRAVENOUS EVERY 6 HOURS
Status: DISCONTINUED | OUTPATIENT
Start: 2021-03-22 | End: 2021-03-25

## 2021-03-22 RX ORDER — FENTANYL CITRATE 50 UG/ML
INJECTION, SOLUTION INTRAMUSCULAR; INTRAVENOUS
Status: COMPLETED
Start: 2021-03-22 | End: 2021-03-22

## 2021-03-22 RX ORDER — BUDESONIDE 0.5 MG/2ML
0.5 INHALANT ORAL 2 TIMES DAILY
Status: DISCONTINUED | OUTPATIENT
Start: 2021-03-22 | End: 2021-04-02 | Stop reason: HOSPADM

## 2021-03-22 RX ORDER — ETOMIDATE 2 MG/ML
INJECTION INTRAVENOUS
Status: COMPLETED
Start: 2021-03-22 | End: 2021-03-22

## 2021-03-22 RX ORDER — LACTULOSE 10 G/15ML
30 SOLUTION ORAL 3 TIMES DAILY
Status: DISCONTINUED | OUTPATIENT
Start: 2021-03-22 | End: 2021-03-22

## 2021-03-22 RX ORDER — INSULIN GLARGINE 100 [IU]/ML
5 INJECTION, SOLUTION SUBCUTANEOUS NIGHTLY
Status: DISCONTINUED | OUTPATIENT
Start: 2021-03-22 | End: 2021-03-23

## 2021-03-22 RX ORDER — SUCCINYLCHOLINE/SOD CL,ISO/PF 100 MG/5ML
SYRINGE (ML) INTRAVENOUS
Status: COMPLETED
Start: 2021-03-22 | End: 2021-03-22

## 2021-03-22 RX ORDER — POLYETHYLENE GLYCOL 3350 17 G/17G
17 POWDER, FOR SOLUTION ORAL DAILY
Status: DISCONTINUED | OUTPATIENT
Start: 2021-03-22 | End: 2021-04-02 | Stop reason: HOSPADM

## 2021-03-22 RX ORDER — SUCCINYLCHOLINE/SOD CL,ISO/PF 100 MG/5ML
100 SYRINGE (ML) INTRAVENOUS ONCE
Status: COMPLETED | OUTPATIENT
Start: 2021-03-22 | End: 2021-03-22

## 2021-03-22 RX ORDER — METHYLPREDNISOLONE SODIUM SUCCINATE 40 MG/ML
40 INJECTION, POWDER, LYOPHILIZED, FOR SOLUTION INTRAMUSCULAR; INTRAVENOUS EVERY 12 HOURS
Status: DISCONTINUED | OUTPATIENT
Start: 2021-03-22 | End: 2021-03-23

## 2021-03-22 RX ADMIN — FENTANYL CITRATE 50 MCG: 50 INJECTION, SOLUTION INTRAMUSCULAR; INTRAVENOUS at 11:19

## 2021-03-22 RX ADMIN — IPRATROPIUM BROMIDE AND ALBUTEROL SULFATE 1 AMPULE: .5; 3 SOLUTION RESPIRATORY (INHALATION) at 04:10

## 2021-03-22 RX ADMIN — METOPROLOL TARTRATE 5 MG: 1 INJECTION, SOLUTION INTRAVENOUS at 10:35

## 2021-03-22 RX ADMIN — DOCUSATE SODIUM 100 MG: 50 LIQUID ORAL at 21:21

## 2021-03-22 RX ADMIN — Medication 10 ML: at 21:00

## 2021-03-22 RX ADMIN — SODIUM CHLORIDE, PRESERVATIVE FREE 10 ML: 5 INJECTION INTRAVENOUS at 08:31

## 2021-03-22 RX ADMIN — PANTOPRAZOLE SODIUM 40 MG: 40 INJECTION, POWDER, FOR SOLUTION INTRAVENOUS at 08:30

## 2021-03-22 RX ADMIN — HEPARIN SODIUM 5000 UNITS: 5000 INJECTION INTRAVENOUS; SUBCUTANEOUS at 13:17

## 2021-03-22 RX ADMIN — ETOMIDATE 20 MG: 2 INJECTION, SOLUTION INTRAVENOUS at 11:34

## 2021-03-22 RX ADMIN — IPRATROPIUM BROMIDE AND ALBUTEROL SULFATE 1 AMPULE: .5; 3 SOLUTION RESPIRATORY (INHALATION) at 06:58

## 2021-03-22 RX ADMIN — ETOMIDATE 20 MG: 2 INJECTION, SOLUTION INTRAVENOUS at 11:21

## 2021-03-22 RX ADMIN — Medication 10 ML: at 08:30

## 2021-03-22 RX ADMIN — Medication 100 MG: at 11:21

## 2021-03-22 RX ADMIN — CHLORHEXIDINE GLUCONATE 15 ML: 1.2 RINSE ORAL at 21:21

## 2021-03-22 RX ADMIN — IPRATROPIUM BROMIDE AND ALBUTEROL SULFATE 1 AMPULE: .5; 3 SOLUTION RESPIRATORY (INHALATION) at 14:56

## 2021-03-22 RX ADMIN — SODIUM CHLORIDE, PRESERVATIVE FREE 10 ML: 5 INJECTION INTRAVENOUS at 21:00

## 2021-03-22 RX ADMIN — PANTOPRAZOLE SODIUM 40 MG: 40 INJECTION, POWDER, FOR SOLUTION INTRAVENOUS at 21:22

## 2021-03-22 RX ADMIN — LACTULOSE 30 G: 20 SOLUTION ORAL at 13:17

## 2021-03-22 RX ADMIN — DOCUSATE SODIUM 100 MG: 50 LIQUID ORAL at 13:17

## 2021-03-22 RX ADMIN — INSULIN GLARGINE 5 UNITS: 100 INJECTION, SOLUTION SUBCUTANEOUS at 22:21

## 2021-03-22 RX ADMIN — CHLORHEXIDINE GLUCONATE 15 ML: 1.2 RINSE ORAL at 08:30

## 2021-03-22 RX ADMIN — SODIUM CHLORIDE 0.4 MCG/KG/HR: 9 INJECTION, SOLUTION INTRAVENOUS at 13:59

## 2021-03-22 RX ADMIN — METOPROLOL TARTRATE 5 MG: 1 INJECTION, SOLUTION INTRAVENOUS at 17:41

## 2021-03-22 RX ADMIN — ETOMIDATE 20 MG: 2 INJECTION INTRAVENOUS at 11:34

## 2021-03-22 RX ADMIN — BUDESONIDE 500 MCG: 0.5 SUSPENSION RESPIRATORY (INHALATION) at 15:04

## 2021-03-22 RX ADMIN — IPRATROPIUM BROMIDE AND ALBUTEROL SULFATE 1 AMPULE: .5; 3 SOLUTION RESPIRATORY (INHALATION) at 19:26

## 2021-03-22 RX ADMIN — METHYLPREDNISOLONE SODIUM SUCCINATE 40 MG: 40 INJECTION, POWDER, FOR SOLUTION INTRAMUSCULAR; INTRAVENOUS at 21:45

## 2021-03-22 RX ADMIN — NITROGLYCERIN 0.5 INCH: 20 OINTMENT TOPICAL at 10:58

## 2021-03-22 RX ADMIN — POLYETHYLENE GLYCOL 3350 17 G: 17 POWDER, FOR SOLUTION ORAL at 13:17

## 2021-03-22 RX ADMIN — HEPARIN SODIUM 5000 UNITS: 5000 INJECTION INTRAVENOUS; SUBCUTANEOUS at 21:22

## 2021-03-22 RX ADMIN — METHYLPREDNISOLONE SODIUM SUCCINATE 40 MG: 125 INJECTION, POWDER, FOR SOLUTION INTRAMUSCULAR; INTRAVENOUS at 10:59

## 2021-03-22 RX ADMIN — NITROGLYCERIN 0.5 INCH: 20 OINTMENT TOPICAL at 17:41

## 2021-03-22 RX ADMIN — METOPROLOL TARTRATE 5 MG: 1 INJECTION, SOLUTION INTRAVENOUS at 21:45

## 2021-03-22 ASSESSMENT — PULMONARY FUNCTION TESTS
PIF_VALUE: 24
PIF_VALUE: 11
PIF_VALUE: 28
PIF_VALUE: 24
PIF_VALUE: 10
PIF_VALUE: 10
PIF_VALUE: 28
PIF_VALUE: 24
PIF_VALUE: 21
PIF_VALUE: 24
PIF_VALUE: 20
PIF_VALUE: 10
PIF_VALUE: 25
PIF_VALUE: 25
PIF_VALUE: 21
PIF_VALUE: 15
PIF_VALUE: 15
PIF_VALUE: 23
PIF_VALUE: 25
PIF_VALUE: 24
PIF_VALUE: 29
PIF_VALUE: 25
PIF_VALUE: 28
PIF_VALUE: 19
PIF_VALUE: 24
PIF_VALUE: 22
PIF_VALUE: 24
PIF_VALUE: 27
PIF_VALUE: 15
PIF_VALUE: 24
PIF_VALUE: 25
PIF_VALUE: 10
PIF_VALUE: 24
PIF_VALUE: 25
PIF_VALUE: 26
PIF_VALUE: 12
PIF_VALUE: 17
PIF_VALUE: 21
PIF_VALUE: 10
PIF_VALUE: 22
PIF_VALUE: 10
PIF_VALUE: 26
PIF_VALUE: 23
PIF_VALUE: 10
PIF_VALUE: 22
PIF_VALUE: 22
PIF_VALUE: 24
PIF_VALUE: 24
PIF_VALUE: 23
PIF_VALUE: 20
PIF_VALUE: 27
PIF_VALUE: 23
PIF_VALUE: 25
PIF_VALUE: 27
PIF_VALUE: 25
PIF_VALUE: 24
PIF_VALUE: 26

## 2021-03-22 ASSESSMENT — PAIN SCALES - GENERAL
PAINLEVEL_OUTOF10: 0

## 2021-03-22 NOTE — PATIENT CARE CONFERENCE
Patient's daughter bedside and informed of patients goals and plan of care for the day. Patient's  Kehinde Bello was also contacted and updated on pt not successfully able to complete SBT due to audible stridor heard and patient visibly unable to tolerate bipap. Daughter and  Kehinde Bello updated on reintubation and consent was given by .  stated he will be up for evening visiting hours.      Electronically signed by Jazmine Khan RN on 3/22/2021 at 4:39 PM

## 2021-03-22 NOTE — PROGRESS NOTES
Nephrology Progress Note    Assessment:  SAÚL d/t cardiac arrest and hypotension  Hypoxic encephalopathy  Anemia   Ventilator dependent  Hx Hypertension      Plan:guarded prognosis with CNS  folow urine out-puts  Refrain from hypotension    Patient Active Problem List:     Hypertension     Hyperlipidemia     Uncontrolled type 2 diabetes mellitus with complication, without long-term current use of insulin (HCC)     Fibromyalgia     Anxiety     Smoker     Insomnia     DJD (degenerative joint disease), lumbar     Lumbosacral radiculopathy at S1     DDD (degenerative disc disease), lumbar     Dysphonia     CTS (carpal tunnel syndrome)     High risk medication use-Norco - 17 OARRS PM&R, 18 OARRS PM&R, 18 OARRS PM&R, Urine Drug screen negative 17 PM&R--MED CONTRACT 17     SOB (shortness of breath) on exertion     Chest pain     Memory deficit     Artificial lens present     Presbyopia     Astigmatism, regular     Cataract, nuclear sclerotic senile     IDDM (insulin dependent diabetes mellitus)     Regular astigmatism     Nuclear senile cataract     Neck pain     Lumbosacral radiculopathy at L5     Spinal stenosis of lumbar region with neurogenic claudication     Impaired mobility and activities of daily living     Non-compliant patient NO showed FU 1/10/17 Dr Maggie Kent     Insomnia secondary to chronic pain     Reactive depression     Diabetic radiculopathy (HCC)     Cervical radicular pain     Diabetic asymmetric polyneuropathy (HCC)     Myalgia     Intercostal neuropathy     Osteoarthritis of spine with radiculopathy, lumbar region     Acute combined systolic and diastolic CHF, NYHA class 1 (HCC)     PMB (postmenopausal bleeding)     Acute on chronic diastolic heart failure (HCC)     CHF (congestive heart failure) ([address]     Hypertensive urgency     Uncontrolled type 2 diabetes mellitus with hyperglycemia ([address]     Chronic obstructive pulmonary disease with acute exacerbation ([address]     Acute on chronic diastolic (congestive) heart failure (HCC)     SAÚL (acute kidney injury) (HCC)     Hypoglycemia     HOCM (hypertrophic obstructive cardiomyopathy) (HCC)     Gastrointestinal hemorrhage     COPD exacerbation (HCC)     Anemia     AVM (arteriovenous malformation)     Symptomatic anemia     Flash pulmonary edema (HCC)     Acute on chronic kidney failure (HCC)     Dysarthria     Chronic fatigue     Acute encephalopathy     Adenomatous polyp of sigmoid colon     Adenomatous polyp of transverse colon     Sepsis (HCC)     Major depressive disorder in remission ([facility]     Gastroesophageal reflux disease without esophagitis     Acute cystitis without hematuria     CKD (chronic kidney disease) stage 4, GFR 15-29 ml/min (HCC)     Cardiopulmonary arrest ([facility]     Gait abnormality     Late effects of CVA (cerebrovascular accident)     Cardiac arrest ([facility]      Subjective:  Admit Date: 3/18/2021    Interval History: daughter in room  Told GFR better but not CNS     Medications:  Scheduled Meds:   metoprolol  5 mg Intravenous Q6H    nitroglycerin  0.5 inch Topical 4 times per day    pantoprazole  40 mg Intravenous BID    And    sodium chloride (PF)  10 mL Intravenous BID    insulin lispro  0-18 Units Subcutaneous Q6H    pantoprazole (PROTONIX) bolus  80 mg Intravenous Once    chlorhexidine  15 mL Mouth/Throat BID    sodium chloride flush  10 mL Intravenous 2 times per day    ipratropium-albuterol  1 ampule Inhalation 4x daily     Continuous Infusions:   dextrose      propofol Stopped (03/22/21 0800)       CBC:   Recent Labs     03/21/21  0528 03/22/21  0530   WBC 10.6 9.6   HGB 8.6* 8.7*    236     CMP:    Recent Labs     03/20/21  0500 03/20/21  0530 03/21/21  0528 03/22/21  0527     --  140 145*   K 4.6  --  4.0 3.9   *  --  108* 111*   CO2 24  --  23 25   BUN 40*  --  37* 31*   CREATININE 2.07* 2.2* 2.04* 1.88*   GLUCOSE 72  --  213* 185*   CALCIUM 8.9  --  8.3* 8.5   LABGLOM 23.2* 22* 23.6* 26.0*     Troponin: No results for input(s): TROPONINI in the last 72 hours. BNP: No results for input(s): BNP in the last 72 hours. INR:   Recent Labs     03/22/21  0528   INR 1.3     Lipids: No results for input(s): CHOL, LDLDIRECT, TRIG, HDL, AMYLASE, LIPASE in the last 72 hours. Liver:   Recent Labs     03/22/21  0527   AST 46*   *   ALKPHOS 97   PROT 5.6*   LABALBU 2.8*   BILITOT <0.2     Iron:  No results for input(s): IRONS, FERRITIN in the last 72 hours. Invalid input(s): LABIRONS  Urinalysis: No results for input(s): UA in the last 72 hours.     Objective:  Vitals: BP (!) 132/55   Pulse 103   Temp 99.1 °F (37.3 °C)   Resp 26   Ht 4' 11\" (1.499 m)   Wt 147 lb 4.3 oz (66.8 kg)   LMP  (LMP Unknown)   SpO2 100%   BMI 29.74 kg/m²    Wt Readings from Last 3 Encounters:   03/21/21 147 lb 4.3 oz (66.8 kg)   01/15/21 131 lb (59.4 kg)   11/27/20 120 lb (54.4 kg)      24HR INTAKE/OUTPUT:      Intake/Output Summary (Last 24 hours) at 3/22/2021 0956  Last data filed at 3/22/2021 0500  Gross per 24 hour   Intake 1173 ml   Output 950 ml   Net 223 ml       General:not alert, in no apparent distress  HEENT: normocephalic, atraumatic, anicteric  Neck: supple, no mass ET in place  Lungs: non-labored respirations, clear to auscultation bilaterally  Heart: regular rate and rhythm, no murmurs or rubs  Abdomen: soft, non-tender, non-distended  Ext: no cyanosis, no peripheral edema  Neuro: alert and oriented, no gross abnormalities  Psych: normal mood and affect  Skin: no rash      Electronically signed by Anastasiya Miller MD

## 2021-03-22 NOTE — PROGRESS NOTES
Department of Internal Medicine  General Internal Medicine  Attending Progress Note      SUBJECTIVE:  Pt seen and examined. Tolerated weaning trial well and was extubated. Subsequently had stridor and respiratory distress and re-intubated this AM. Sedated today.  Vitals stable    OBJECTIVE      Medications    Current Facility-Administered Medications: metoprolol (LOPRESSOR) injection 5 mg, 5 mg, Intravenous, Q6H  nitroglycerin (NITRO-BID) 2 % ointment 0.5 inch, 0.5 inch, Topical, 4 times per day  heparin (porcine) injection 5,000 Units, 5,000 Units, Subcutaneous, 3 times per day  methylPREDNISolone sodium (SOLU-MEDROL) injection 40 mg, 40 mg, Intravenous, Q12H  dexmedetomidine (PRECEDEX) 400 mcg in sodium chloride 0.9 % 100 mL infusion, 0.2-1.4 mcg/kg/hr, Intravenous, Continuous  insulin glargine (LANTUS) injection vial 5 Units, 5 Units, Subcutaneous, Nightly  docusate (COLACE) 50 MG/5ML liquid 100 mg, 100 mg, Oral, BID  polyethylene glycol (GLYCOLAX) packet 17 g, 17 g, Oral, Daily  pantoprazole (PROTONIX) injection 40 mg, 40 mg, Intravenous, BID **AND** sodium chloride (PF) 0.9 % injection 10 mL, 10 mL, Intravenous, BID  insulin lispro (HUMALOG) injection vial 0-18 Units, 0-18 Units, Subcutaneous, Q6H  morphine (PF) injection 2 mg, 2 mg, Intravenous, Q2H PRN  hydrALAZINE (APRESOLINE) injection 10 mg, 10 mg, Intravenous, Q4H PRN **OR** hydrALAZINE (APRESOLINE) injection 20 mg, 20 mg, Intravenous, Q4H PRN  glucose (GLUTOSE) 40 % oral gel 15 g, 15 g, Oral, PRN  dextrose 50 % IV solution, 12.5 g, Intravenous, PRN  glucagon (rDNA) injection 1 mg, 1 mg, Intramuscular, PRN  dextrose 5 % solution, 100 mL/hr, Intravenous, PRN  labetalol (NORMODYNE;TRANDATE) injection 20 mg, 20 mg, Intravenous, Q4H PRN  labetalol (NORMODYNE;TRANDATE) injection 40 mg, 40 mg, Intravenous, Q4H PRN  propofol injection, 5-50 mcg/kg/min, Intravenous, Titrated  pantoprazole (PROTONIX) 80 mg in sodium chloride 0.9 % 50 mL bolus, 80 mg, Intravenous, Once  promethazine (PHENERGAN) tablet 12.5 mg, 12.5 mg, Oral, Q6H PRN **OR** ondansetron (ZOFRAN) injection 4 mg, 4 mg, Intravenous, Q6H PRN  chlorhexidine (PERIDEX) 0.12 % solution 15 mL, 15 mL, Mouth/Throat, BID  sodium chloride flush 0.9 % injection 10 mL, 10 mL, Intravenous, 2 times per day  sodium chloride flush 0.9 % injection 10 mL, 10 mL, Intravenous, PRN  acetaminophen (TYLENOL) tablet 650 mg, 650 mg, Oral, Q6H PRN **OR** acetaminophen (TYLENOL) suppository 650 mg, 650 mg, Rectal, Q6H PRN  ipratropium-albuterol (DUONEB) nebulizer solution 1 ampule, 1 ampule, Inhalation, 4x daily  Physical    VITALS:  BP (!) 129/55   Pulse 96   Temp 98.6 °F (37 °C)   Resp 16   Ht 4' 11\" (1.499 m)   Wt 147 lb 4.3 oz (66.8 kg)   LMP  (LMP Unknown)   SpO2 100%   BMI 29.74 kg/m²   Constitutional: intubated and sedated  Head: Normocephalic, atraumatic  ENT: moist mucous membranes, ETT in place  Neck: neck supple, trachea midline  Lungs: Good inspiratory effort, bilateral rhonchi and crackles  Heart: RRR, normal S1 and S2  GI: Soft, non-distended, +BS  MSK: no edema noted  Skin: warm, dry     Data    CBC:   Lab Results   Component Value Date    WBC 9.6 03/22/2021    RBC 2.86 03/22/2021    HGB 8.7 03/22/2021    HCT 27.2 03/22/2021    MCV 95.2 03/22/2021    MCH 30.4 03/22/2021    MCHC 32.0 03/22/2021    RDW 18.2 03/22/2021     03/22/2021    MPV 8.8 08/01/2015     CMP:    Lab Results   Component Value Date     03/22/2021    K 3.9 03/22/2021    K 4.4 10/19/2020     03/22/2021    CO2 25 03/22/2021    BUN 31 03/22/2021    CREATININE 1.88 03/22/2021    GFRAA 31.4 03/22/2021    LABGLOM 26.0 03/22/2021    GLUCOSE 185 03/22/2021    PROT 5.6 03/22/2021    LABALBU 2.8 03/22/2021    CALCIUM 8.5 03/22/2021    BILITOT <0.2 03/22/2021    ALKPHOS 97 03/22/2021    AST 46 03/22/2021     03/22/2021       ASSESSMENT AND PLAN      # Cardiac arrest s/p ROSC   - initially required Dopamine, norepinephrine and epinephrine infusion  - off pressors and hypothermia protocol  - TTE showed LVEF of 50%  - cardiology following     # Acute hypoxic respiratory failure  - requiring intubation and mechanical ventilation  - extubated this AM, but had to be re-intubated due to stridor and respiratory distress. - management per critical care service     # Acute encephalopathy- improved  - was following commands and awake and alert during SBT today  - likely hypoxic-ischemic etiology from cardiac arrest  - CT head was negative per report  - neurology following     # Acute / chronic anemia   - with Hb of 5.8 on arrival, FOBT was positive  - improved s/p PRBCs given in ED  - on Protonix   - follow H/H      # Lactic acidosis  - in the setting of cardiac arrest  - improved with volume repletion     # Hyperkalemia  - resolved     # SAÚL/CKD3  - nephrology following     # IDDM2 with hyperglycemia  - improved, off insulin drip, on ISS     # Leukocytosis   - improved     Code status - [facility]    Disposition: Tolerated SBT and was extubated and subsequently re-intubated this AM due to respiratory distress. Will continue steroids, abx. Pulm consulted.        Braulio Cheung, DO  Internal Medicine

## 2021-03-22 NOTE — PROGRESS NOTES
0730 Assumed care of this patient at this time. 1000 Patient on C-pap mode, tolerating well at this time. 1055 Patient extubated - audible stridor heard, patient removing bi-pap mask and thrashing in discomfort. 1122 Patient intubated- Dr. Lazara Leong spoke with  Joann Hercules for consent which was given- daughter bedside. Patient tolerating vent. Patient is a RASS of -2, wakes to speech and pain but does not systain eye contact. 1600- Patient's  was bedside and updated on patients care. 1950- Patient is at goal of tube feeding rate- had a residual of 150 mL. Patient awakens to pain stimuli and is a RASS of -2. Patient nodded no when asked if in pain at this time.

## 2021-03-22 NOTE — PROGRESS NOTES
Neurology Follow up    SUBJECTIVE: Patient remains intubated on light sedation. She does awaken with name but does not follow any commands.   Sedation was discontinued for a brief period of time and she moves all extremities but did not follow commands according to the nurse  Patient sedation just discontinued about 45 minutes ago and she is still very lethargic and yet not following commands    Current Facility-Administered Medications   Medication Dose Route Frequency Provider Last Rate Last Admin    metoprolol (LOPRESSOR) injection 5 mg  5 mg Intravenous Q6H Kia Newman MD        nitroglycerin (NITRO-BID) 2 % ointment 0.5 inch  0.5 inch Topical 4 times per day Kia Newman MD        pantoprazole (PROTONIX) injection 40 mg  40 mg Intravenous BID Matthew Stapleton MD   40 mg at 03/22/21 0830    And    sodium chloride (PF) 0.9 % injection 10 mL  10 mL Intravenous BID Matthew Stapleton MD   10 mL at 03/22/21 0831    insulin lispro (HUMALOG) injection vial 0-18 Units  0-18 Units Subcutaneous Q6H Bill Sine Sedar, DO   3 Units at 03/22/21 0528    morphine (PF) injection 2 mg  2 mg Intravenous Q2H PRN Murtis Cousin, DO        hydrALAZINE (APRESOLINE) injection 10 mg  10 mg Intravenous Q4H PRN Bill Sine Sedar, DO   10 mg at 03/19/21 0606    Or    hydrALAZINE (APRESOLINE) injection 20 mg  20 mg Intravenous Q4H PRN Bill Sine Sedar, DO        glucose (GLUTOSE) 40 % oral gel 15 g  15 g Oral PRN Bill Sine Sedar, DO        dextrose 50 % IV solution  12.5 g Intravenous PRN Bill Sine Sedar, DO   12.5 g at 03/20/21 0438    glucagon (rDNA) injection 1 mg  1 mg Intramuscular PRN Bill Sine Sedar, DO        dextrose 5 % solution  100 mL/hr Intravenous PRN Bill Sine Sedar, DO        labetalol (NORMODYNE;TRANDATE) injection 20 mg  20 mg Intravenous Q4H PRN Murtis Cousin, DO   20 mg at 03/19/21 1701    labetalol (NORMODYNE;TRANDATE) injection 40 mg  40 mg Intravenous Q4H PRN Murtis Cousin, DO   40 mg at 03/19/21 0827    propofol injection  5-50 Left Upper Extremity:  normal             Right Lower Extremity:  normal             Left Lower Extremity:  normal           Vibration                         Touch            Proprioception                 Coordination:           Finger/Nose   Right:  normal              Left:  normal          Heel-Knee-Shin                Right:  normal              Left:  normal          Rapid Alternating Movements              Right:  normal              Left:  normal          Gait:                       Casual:  normal                         Romberg:  normal            Reflexes:             Deep Tendon Reflexes:             Reflexes are 2 +             Plantar response:                Right:  downgoing               Left:  downgoing    Vascular:  Cardiac Exam:  normal         Echocardiogram Complete 2d With Doppler With Color    Result Date: 3/19/2021  Transthoracic Echocardiography Report (TTE)  Demographics  Patient Name   Sarah Carpenter  Gender              Female                 M  Patient Number [id number]        Race                Black                                 Ethnicity  Visit Number   [id number]       Room Number         IC05  Corporate ID                   Date of Study       03/19/2021  Accession      [id number]      Referring Physician  Number  Date of Birth  1945      Sonographer         Roberto Arriola LPN  Age            67 year(s)      Interpreting        [facility]) Cardiology                                 Physician           Stephanie Kuo MD Procedure Type of Study  TTE procedure:ECHO COMPLETE 2D W/DOP W/COLOR. Procedure Date Date: 03/19/2021 Start: 09:15 AM Study Location: Portable Technical Quality: Good visualization Indications:Cardiac arrest. Patient Status: Routine Weight: 143 pounds BP: 89/48 mmHg Allergies   - Other allergy:(Darvon). Conclusions  Summary  Mitral annular calcification is present.   1-2+ MR  Normal tricuspid valve structure and function. 1+ TR  RVSP 31 mmHg  severe [facility]  LVEF 50%  E/A flow reversal noted. Suggestive of diastolic dysfunction. Signature  ----------------------------------------------------------------  Electronically signed by Slim Maher MD(Interpreting  physician) on 03/19/2021 02:13 PM  ----------------------------------------------------------------  Findings Left Ventricle severe [facility] LVEF 50% E/A flow reversal noted. Suggestive of diastolic dysfunction. Right Ventricle Normal right ventricle structure and function. Normal right ventricle systolic pressure. Left Atrium Moderately dilated left atrium. Right Atrium Normal right atrium. Mitral Valve Mitral annular calcification is present. 1-2+ MR Tricuspid Valve Normal tricuspid valve structure and function. 1+ TR RVSP 31 mmHg Aortic Valve Normal aortic valve structure and function. Pulmonic Valve The pulmonic valve was not well visualized . Pericardial Effusion No evidence of pericardial effusion. Pleural Effusion No evidence of pleural effusion. Aorta \ Miscellaneous The aorta is within normal limits. M-Mode Measurements (cm)  LVIDd: 4.01 cm                         LVIDs: 1.85 cm  IVSd: 1.45 cm                          IVSs: 1.23 cm  LVPWd: 1.32 cm                         LVPWs: 1.61 cm  Rt. Vent.  Dimension: 1.07 cm           AO Root Dimension: 2.8 cm                                         ACS: 1.47 cm                                         LA: 3.04 cm                                         LVOT: 1.98 cm Doppler Measurements:  AV Velocity:0.01 m/s                   MV Peak E-Wave: 0.96 m/s  AV Peak Gradient: 7.73 mmHg            MV Peak A-Wave: 1.58 m/s  AV Mean Gradient: 3.01 mmHg  AV Area (Continuity):1.27 cm^2         MV P1/2t: 105.6 msec  TR Velocity:2.29 m/s                   MVA by PHT2.08 cm^2  TR Gradient:21.01 mmHg                 Estimated RAP:10 mmHg                                         RVSP:31 mmHg Valves  Mitral Valve  Peak E-Wave: 0.96 m/s          Peak A-Wave: 1.58 m/s  P1/2t: 105.6 msec              E/A Ratio: 0.61                                 Peak Gradient: 3.7 mmHg  Area (PHT): 2.08 cm^2          Deceleration Time: 349.6 msec  MR Velocity: 4.66 m/s  Aortic Valve  Peak Velocity: 1.39 m/s                Mean Velocity: 0.79 m/s  Peak Gradient: 7.73 mmHg               Mean Gradient: 3.01 mmHg  Area (continuity): 1.27 cm^2  AV VTI: 28.28 cm  Cusp Separation: 1.47 cm  Tricuspid Valve  Estimated RVSP: 31 mmHg              Estimated RAP: 10 mmHg  TR Velocity: 2.29 m/s                TR Gradient: 21.01 mmHg  Pulmonic Valve  Peak Velocity: 0.68 m/s           Peak Gradient: 1.86 mmHg                                    Estimated PASP: 31.01 mmHg  LVOT  Peak Velocity: 0.72 m/s              Mean Velocity: 0.41 m/s  Peak Gradient: 2 mmHg                Mean Gradient: 0.9 mmHg  LVOT Diameter: 1.98 cm               LVOT VTI: 11.64 cm Structures  Left Atrium  LA Dimension: 3.04 cm                 LA Area: 18.08 cm^2  LA/Aorta: 1.09  LA Volume/Index: 49.97 ml  Left Ventricle  Diastolic Dimension: 7.05 cm         Systolic Dimension: 9.27 cm  Septum Diastolic: 7.70 cm            Septum Systolic: 5.42 cm  PW Diastolic: 4.59 cm                PW Systolic: 9.88 cm                                       FS: 53.9 %  LV EDV/LV EDV Index: 70.29 ml        LV ESV/LV ESV Index: 10.47 ml  EF Calculated: 85.1 %  LVOT Diameter: 1.98 cm  Right Ventricle  Diastolic Dimension: 0.52 cm                                    RV Systolic Pressure: 43.13 mmHg Aorta/ Miscellaneous Aorta  Aortic Root: 2.8 cm  LVOT Diameter: 1.98 cm    Xr Chest Portable    Result Date: 3/19/2021  EXAMINATION: XR CHEST PORTABLE CLINICAL HISTORY: INTUBATION COMPARISONS: OCTOBER 9, 2020 FINDINGS: Endotracheal tube has migrated 3 cm inferiorly, just lying cephalad to mojgan. Nasogastric tube courses beneath diaphragm. Osseous structures intact. Cardiopericardial silhouette normal. Aorta calcified. Ill-defined area increased opacity right  lung base. RIGHT LOWER LUNG SUBSEGMENTAL ATELECTASIS/PNEUMONIA. INTERVAL INFERIOR MIGRATION ENDOTRACHEAL TUBE 3 CM WITH TIP NOW JUST PROXIMAL TO MOJGAN. MAY WISH TO RETRACT ENDOTRACHEAL TUBE 2 CM. Xr Chest Portable    Result Date: 3/19/2021  EXAMINATION: XR CHEST PORTABLE CLINICAL HISTORY: RESPIRATORY FAILURE COMPARISONS: MARCH 16, 2021 FINDINGS: Endotracheal tube tip 2.3 cm superior to mojgan. Nasogastric tube courses beneath diaphragm area osseous structures intact. Ill-defined areas increase opacity at lung bases. Cardiopericardial silhouette normal.     BIBASILAR SUBSEGMENTAL ATELECTASIS/PNEUMONIA IN THIS PARTIALLY EXPIRATORY CHEST RADIOGRAPH. Recent Labs     03/20/21  0500 03/20/21  0530 03/21/21  0528 03/22/21  0530   WBC 11.6*  --  10.6 9.6   HGB 9.8* 10.5* 8.6* 8.7*     --  272 236     Recent Labs     03/20/21  0500 03/20/21  0530 03/21/21  0528 03/22/21  0527     --  140 145*   K 4.6  --  4.0 3.9   *  --  108* 111*   CO2 24  --  23 25   BUN 40*  --  37* 31*   CREATININE 2.07* 2.2* 2.04* 1.88*   GLUCOSE 72  --  213* 185*     Recent Labs     03/20/21  0500 03/21/21  0528 03/22/21  0527   BILITOT 0.4 <0.2 <0.2   ALKPHOS 102 93 97   * 90* 46*   * 164* 110*     Lab Results   Component Value Date    PROTIME 16.2 03/22/2021    INR 1.3 03/22/2021     No results found for: LITHIUM, DILFRTOT, VALPROATE    ASSESSMENT AND PLAN  Encephalopathy secondary to cardiorespiratory arrest.  Patient may have some hypoxemic damage though this very difficult to certain as patient is sedated and intubated. We will keep an eye on this repeat CT scan did not anything significant. We will try and continue to lighten her sedation to reexamine.   For now there is a nonfocal examination    Patient sedation just discontinued and still not following commands will await her to wake up and extubate and then obtain an MRI as there appears to be some session of right-sided weakness which we cannot do certain due to her underlying sedation at this time. Critical care time 35 minutes    Mehul Corbin MD, Eusebio Tubbs, American Board of Psychiatry & Neurology  Board Certified in Vascular Neurology  Board Certified in Neuromuscular Medicine  Certified in Maribell Lara

## 2021-03-22 NOTE — PROGRESS NOTES
Pulmonary & Critical Care Medicine ICU Progress Note  Chief complaint : Post cardiac arrest, acute respiratory failure    Subjunctive/24 hour events :   Patient seen and examined during multidisciplinary rounds with RN, charge nurse, RT, pharmacy, dietitian, and social service. Patient on vent, she is awake, follows command but weaker on the right upper extremities, sedation stopped earlier today, she is currently on CPAP trial, her cough is weak, urine output 950 cc, no fever, patient is not on pressors, tolerating tube feed, no bowel movement, and blood sugar is controlled      Social History     Tobacco Use    Smoking status: Former Smoker     Packs/day: 1.00     Years: 59.00     Pack years: 59.00     Types: Cigarettes     Start date: 2017    Smokeless tobacco: Never Used    Tobacco comment: quit 2-3 weeks ago    Substance Use Topics    Alcohol use: Not Currently         Problem Relation Age of Onset    Heart Disease Father         cardiac bypass    Arthritis Father     Arthritis Mother     Other Mother          at age 80    Other Sister         [facility]    No Known Problems Daughter     Stroke Son        Recent Labs     21  1309 21  0530   PHART 7.391 7.354   [id number] 40 43   PO2ART 119* 79*       MV Settings:  Vent Mode: AC/VC+ Rate Set: 16 bmp/Vt Ordered: 350 mL/ /FiO2 : 35 %           IV:   dextrose      propofol Stopped (21 0800)       Vitals:  BP (!) 132/55   Pulse 103   Temp 99.1 °F (37.3 °C)   Resp 26   Ht 4' 11\" (1.499 m)   Wt 147 lb 4.3 oz (66.8 kg)   LMP  (LMP Unknown)   SpO2 100%   BMI 29.74 kg/m²    Tmax:        Intake/Output Summary (Last 24 hours) at 3/22/2021 0951  Last data filed at 3/22/2021 0500  Gross per 24 hour   Intake 1173 ml   Output 950 ml   Net 223 ml       EXAM:  General: alert, on vent, no distress  Head: normocephalic, atraumatic  Eyes:No gross abnormalities.   ENT:  MMM no lesions, ET and OG tube in  Neck:  supple and no masses  Chest : Good air movement, minimal rales at the base, no wheezing, nontender, tympanic  Heart[de-identified] Heart sounds are normal.  Regular rate and rhythm without murmur, gallop or rub. ABD:  symmetric, soft, non-tender, no guarding or rebound  Musculoskeletal : no cyanosis, no clubbing and no edema  Neuro:  She follows commands, awake, weaker right upper extremity compared to the rest  Skin: No rashes or nodules noted.   Lymph node:  no cervical nodes  Urology: Yes Sanchez   Psychiatric: appropriate    Medications:  Scheduled Meds:   metoprolol  5 mg Intravenous Q6H    nitroglycerin  0.5 inch Topical 4 times per day    pantoprazole  40 mg Intravenous BID    And    sodium chloride (PF)  10 mL Intravenous BID    insulin lispro  0-18 Units Subcutaneous Q6H    pantoprazole (PROTONIX) bolus  80 mg Intravenous Once    chlorhexidine  15 mL Mouth/Throat BID    sodium chloride flush  10 mL Intravenous 2 times per day    ipratropium-albuterol  1 ampule Inhalation 4x daily       PRN Meds:  morphine, hydrALAZINE **OR** hydrALAZINE, glucose, dextrose, glucagon (rDNA), dextrose, labetalol, labetalol, promethazine **OR** ondansetron, sodium chloride flush, polyethylene glycol, acetaminophen **OR** acetaminophen    Results: reviewed by me   CBC:   Recent Labs     03/20/21  0500 03/20/21  0530 03/21/21 0528 03/22/21  0530   WBC 11.6*  --  10.6 9.6   HGB 9.8* 10.5* 8.6* 8.7*   HCT 30.2*  --  27.1* 27.2*   MCV 93.8  --  93.1 95.2     --  272 236     BMP:   Recent Labs     03/20/21  0500 03/20/21  0530 03/21/21  0528 03/22/21  0527     --  140 145*   K 4.6  --  4.0 3.9   *  --  108* 111*   CO2 24  --  23 25   PHOS 6.4*  --  4.7 3.1   BUN 40*  --  37* 31*   CREATININE 2.07* 2.2* 2.04* 1.88*     LIVER PROFILE:   Recent Labs     03/20/21  0500 03/21/21  0528 03/22/21  0527   * 90* 46*   * 164* 110*   BILITOT 0.4 <0.2 <0.2   ALKPHOS 102 93 97     PT/INR:   Recent Labs     03/20/21  0500 03/21/21  0528 03/22/21  0528   PROTIME 15.7* 16.5* 16.2*   INR 1.2 1.3 1.3     APTT:   Recent Labs     03/20/21  0500 03/21/21  0528 03/22/21  0528   APTT 37.3* 42.1* 38.0*     UA:No results for input(s): NITRITE, COLORU, PHUR, LABCAST, WBCUA, RBCUA, MUCUS, TRICHOMONAS, YEAST, BACTERIA, CLARITYU, SPECGRAV, LEUKOCYTESUR, UROBILINOGEN, BILIRUBINUR, BLOODU, GLUCOSEU, AMORPHOUS in the last 72 hours. Invalid input(s): Karlie Perry    Cultures:  Negative so far  Films:  CXR reviewed by me and it showed left lower lobe atelectasis versus infiltrate      Assessment: This is a critically ill patient at risk of deterioration / death , needing close ICU monitoring and intervention due to below noted problems   · Acute hypoxic respiratory failure due to cardiac arrest  · Cardiac arrest, possibly induced by hyperkalemia  · Acute on chronic kidney injury, improving  · Shock physiology resolved  · Diabetes  · Constipation    Recommendations  · Vent support lung protective strategy  · head of the bed 30°  · Daily sedation holidays and breathing trials  · Minimize to no sedation  · Watch for ICU delirium: TV on, natural light, avoid benzos, pain control, early mobility, and family engagement  · PUD prophylaxis  · DVT prophylaxis  · Continue cardioprotective meds  · Start Lantus  · Monitor blood sugar target 140180  · Monitor urine output  · Start Colace and MiraLAX, will also give 1 dose of lactulose    Due to the immediate potential for life-threatening deterioration due to acute respiratory failure I spent 32  minutes providing critical care.  This time is excluding time spent performing procedures.           Electronically signed by Sukhwinder Sterling MD,  [facility] ,on 3/22/2021 at 9:51 AM

## 2021-03-22 NOTE — PLAN OF CARE
Nutrition Problem #1: Inadequate oral intake  Intervention: Food and/or Nutrient Delivery: Modify Tube Feeding(Increase EN to goal. Semi-elemental TF ( Vital 1.2) to 45 ml/hr x 24 hrs via OG. Increase water flush to 200 ml, 4 x day.  Monitor for improvement in Na for further adjsustments in flush)  Nutritional Goals: new goals: EN to deliver kcals/protien within range of estimated needs, Na wnl

## 2021-03-22 NOTE — PROGRESS NOTES
Progress Note  Patient: Doc Maguire  Unit/Bed: IC05/IC05-01  YOB: 1945  MRN: [id number]  Acct: [de-identified]   Admitting Diagnosis: Cardiac arrest [facility]) [I46.9]  Admit Date:  3/18/2021  Hospital Day: 4    Chief Complaint: CPA    Histories:  Past Medical History:   Diagnosis Date    Anxiety     Asthma     dx 2019 / has smoked since age 14    CHF (congestive heart failure) (HCC)     Chronic back pain     Bilateral L5 S1 Radic on emg--surprisingly worse on the left than the right--pt's symptoms and her MRI show worse on the right    Chronic obstructive pulmonary disease with acute exacerbation ([facility] [address] 10/12/2019    Depression     Fibromyalgia     Hyperlipidemia     meds > 8 yrs    Hypertension     meds > 45 yrs    On home O2     2l per n/c at bedtime mostly,     Osteoarthritis     Type II diabetes mellitus, uncontrolled ([facility] [address]     hx > 8 yrs    Unspecified sleep apnea      Past Surgical History:   Procedure Laterality Date    BACK SURGERY  2017    lumbar disc    CARDIAC CATHETERIZATION  11/3/14     DR. MIRELES / no stents    COLONOSCOPY  08/29/2016    w/polypectomy     COLONOSCOPY N/A 9/29/2020    COLONOSCOPY WITH POLYPECTOMY performed by Shamika Barroso MD at [facility] N/A 10/7/2019    EUA HYSTEROSCOPY DILATATION AND CURETTAGE performed by Concepción Smith DO at [facility]. [address], COLON, DIAGNOSTIC      EYE SURGERY      [place] with IOL OU / [id number]    umbilical hernia repair    TN ESOPHAGOGASTRODUODENOSCOPY TRANSORAL DIAGNOSTIC N/A 3/24/2017    EGD ESOPHAGOGASTRODUODENOSCOPY performed by Mat Prasad MD at [facility] 2/8/2018    negative findings    TN REVISE MEDIAN N/CARPAL TUNNEL SURG Left 6/5/2017    LEFT  CARPAL TUNNEL RELEASE performed by Rachael Gannon MD at [facility],[place] N/A 2/8/2018    TONSILLECTOMY      as child  UPPER GASTROINTESTINAL ENDOSCOPY  2016    w/bx     UPPER GASTROINTESTINAL ENDOSCOPY N/A 2020    EGD possible biopsy performed by Adriana Riley MD at [place] [address] N/A 2020    EGD PUSH ENTEROSCOPY performed by Chelly Estrada MD at [facility]     Family History   Problem Relation Age of Onset    Heart Disease Father         cardiac bypass    Arthritis Father     Arthritis Mother     Other Mother          at age 80    Other Sister         nuknown health hx    No Known Problems Daughter     Stroke Son      Social History     Socioeconomic History    Marital status:      Spouse name: Harlan Rodriguez Number of children: 2    Years of education: 15    Highest education level: High school graduate   Occupational History    Occupation: Retired-   Social Needs    Financial resource strain: Not hard at all   [employer] insecurity     Worry: Never true     Inability: Never true    Transportation needs     Medical: No     Non-medical: No   Tobacco Use    Smoking status: Former Smoker     Packs/day: 1.00     Years: 59.00     Pack years: 59.00     Types: Cigarettes     Start date: 2017    Smokeless tobacco: Never Used    Tobacco comment: quit 2-3 weeks ago    Substance and Sexual Activity    Alcohol use: Not Currently    Drug use: No    Sexual activity: Yes     Partners: Male   Lifestyle    Physical activity     Days per week: 0 days     Minutes per session: 0 min    Stress: Only a little   Relationships    Social connections     Talks on phone: More than three times a week     Gets together: More than three times a week     Attends [employer] service: 1 to 4 times per year     Active member of club or organization: No     Attends meetings of clubs or organizations: Never     Relationship status: Living with partner   [place] Intimate partner violence     Fear of current or ex partner: No     Emotionally abused:  No Physically abused: No     Forced sexual activity: No   Other Topics Concern    None   Social History Narrative    Grew up in [place]     Lives With:  [address]: Two level, Performs ADL's on one level    Home Access: Stairs to enter with rails    Entrance Stairs - Number of Steps: 4    Bathroom Shower/Tub: Tub/Shower unit, Doors    Bathroom Equipment: Shower chair, Grab bars in [facility]: Rolling walker, Cane, Oxygen(uses her O2 very seldom)    ADL Assistance: Needs assistance    Homemaking Assistance: Needs assistance    Homemaking Responsibilities: No(spouse performs)    Ambulation Assistance: Independent    Transfer Assistance: Independent    Active : No    Occupation: Retired    Type of occupation: worked in [employer]: patient enjoys televsision       Subjective/HPI Daughter, Rasheeda, in room. Pt is off Sedatives. Pt is alert and follows simple commands. EKG:         Review of Systems:   Review of Systems  vented    Physical Examination:    BP (!) 132/55   Pulse 103   Temp 99.1 °F (37.3 °C)   Resp 26   Ht 4' 11\" (1.499 m)   Wt 147 lb 4.3 oz (66.8 kg)   LMP  (LMP Unknown)   SpO2 100%   BMI 29.74 kg/m²    Physical Exam   Constitutional: She appears healthy. No distress. HENT:   Normal cephalic and Atraumatic   Eyes: Pupils are equal, round, and reactive to light. Neck: Normal range of motion and thyroid normal. Neck supple. No JVD present. No neck adenopathy. No thyromegaly present. Cardiovascular: Normal rate, regular rhythm, intact distal pulses and normal pulses. Murmur heard. Pulmonary/Chest: Effort normal and breath sounds normal. She has no wheezes. She has no rales. She exhibits no tenderness. Abdominal: Soft. Bowel sounds are normal. There is no abdominal tenderness. Musculoskeletal: Normal range of motion. General: No tenderness or edema.    Neurological: She is alert and oriented to person, place, and time. Skin: Skin is warm. No cyanosis. Nails show no clubbing. LABS:  CBC:   Lab Results   Component Value Date    WBC 9.6 03/22/2021    RBC 2.86 03/22/2021    HGB 8.7 03/22/2021    HCT 27.2 03/22/2021    MCV 95.2 03/22/2021    MCH 30.4 03/22/2021    MCHC 32.0 03/22/2021    RDW 18.2 03/22/2021     03/22/2021    MPV 8.8 08/01/2015     CBC with Differential:    Lab Results   Component Value Date    WBC 9.6 03/22/2021    RBC 2.86 03/22/2021    HGB 8.7 03/22/2021    HCT 27.2 03/22/2021     03/22/2021    MCV 95.2 03/22/2021    MCH 30.4 03/22/2021    MCHC 32.0 03/22/2021    RDW 18.2 03/22/2021    NRBC 1 08/28/2020    LYMPHOPCT 7.5 03/22/2021    MONOPCT 7.8 03/22/2021    BASOPCT 0.2 03/22/2021    MONOSABS 0.7 03/22/2021    LYMPHSABS 0.7 03/22/2021    EOSABS 0.1 03/22/2021    BASOSABS 0.0 03/22/2021     CMP:    Lab Results   Component Value Date     03/22/2021    K 3.9 03/22/2021    K 4.4 10/19/2020     03/22/2021    CO2 25 03/22/2021    BUN 31 03/22/2021    CREATININE 1.88 03/22/2021    GFRAA 31.4 03/22/2021    LABGLOM 26.0 03/22/2021    GLUCOSE 185 03/22/2021    PROT 5.6 03/22/2021    LABALBU 2.8 03/22/2021    CALCIUM 8.5 03/22/2021    BILITOT <0.2 03/22/2021    ALKPHOS 97 03/22/2021    AST 46 03/22/2021     03/22/2021     BMP:    Lab Results   Component Value Date     03/22/2021    K 3.9 03/22/2021    K 4.4 10/19/2020     03/22/2021    CO2 25 03/22/2021    BUN 31 03/22/2021    LABALBU 2.8 03/22/2021    CREATININE 1.88 03/22/2021    CALCIUM 8.5 03/22/2021    GFRAA 31.4 03/22/2021    LABGLOM 26.0 03/22/2021    GLUCOSE 185 03/22/2021     Magnesium:    Lab Results   Component Value Date    MG 2.9 03/22/2021     Troponin:    Lab Results   Component Value Date    TROPONINI 0.056 03/19/2021        Active Hospital Problems    Diagnosis Date Noted    Cardiac arrest [facility]) [I46.9] 03/18/2021     Priority: Low        Assessment/Plan:  1.  CPA - Encephalopathy much improved over the weekend. 2. CAD - LAD mild  3. LVEF 55%  4. Echo Severe LVH. 1-2+ MR RVSP 31 mmHg. LVEF 50%  5. History of HCM - need to Keep HR close to 60s. Add iv BB. Will also add Nitrates  6. SAÚL - Nephrology following- gradually improving.         Electronically signed by Sabra Cantor MD on 3/22/2021 at 9:39 AM

## 2021-03-22 NOTE — PROGRESS NOTES
Comprehensive Nutrition Assessment    Type and Reason for Visit:  Reassess    Nutrition Recommendations/Plan:   Increase EN to goal.   Semi-elemental TF ( Vital 1.2) to 45 ml/hr x 24 hrs via OG. Increase water flush to 200 ml, 4 x day. Monitor for improvement in Na for further adjsustments in flush    Nutrition Assessment:  Pt remains at risk for malnutrition related to critical illness with intubation, will increase EN to goal and increase water flush as discusssed in rounds, due to hypernatremia    Malnutrition Assessment:  Malnutrition Status: At risk for malnutrition (Comment)    Context:  Acute Illness     Findings of the 6 clinical characteristics of malnutrition:  Energy Intake:  7 - 50% or less of estimated energy requirements for 5 or more days  Weight Loss:  No significant weight loss     Body Fat Loss:  No significant body fat loss     Muscle Mass Loss:  No significant muscle mass loss    Fluid Accumulation:  No significant fluid accumulation     Strength:  Not Performed    Estimated Daily Nutrient Needs:  Energy (kcal):  1350-8639 kcals @ 22-2 5 kcal/kg;  Weight Used for Energy Requirements:  Admission(65 kg)     Protein (g):  ~ 86 g protein @ 2g/kg IBW; Weight Used for Protein Requirements:  Ideal(43 kg)        Fluid (ml/day):  ~0537-7417 ml @ 25-28 ml/kg IBW; Method Used for Fluid Requirements:  ml/Kg(43 kg)      Nutrition Related Findings:  intubated 3/18, s/p cardiac arrest wtih completed therpuetic,hypothermia, trophic TF started 3/20 and has tolerated well, last BM 3/18 with bowel regimen ordered ( + lactulose today), labs noted, Glucose poorly controlled ( steroids)      Wounds:  None       Current Nutrition Therapies:    Current Tube Feeding (TF) Orders:  · Feeding Route: Orogastric  · Formula: Semi-Elemental  · Schedule:  @ 20 ml/hr to increase to 45 ml./hr  · Water Flushes: change to 200 ml, 4 x daily ( 800 ml)  · Current TF & Flush Orders Provides: 576 kcal, 36 gm protein, ~ 790 ml free water  · Goal TF & Flush Orders Provides: 1296 kcals, 81 g protein, 1675 ml free water    Anthropometric Measures:  · Height: 4' 11\" (149.9 cm)  · Current Body Weight: 147 lb (66.7 kg)   · Admission Body Weight: 143 lb (64.9 kg)    · Usual Body Weight: 146 lb (66.2 kg)(( 10/20), 138# ( 3/20))     · Ideal Body Weight: 95 lbs;   · BMI: 29.7  · BMI Categories: Overweight (BMI 25.0-29.9)(Based on UBW/Admit weight)       Nutrition Diagnosis:   · Inadequate oral intake related to impaired respiratory function as evidenced by NPO or clear liquid status due to medical condition, intubation    Nutrition Interventions:   Food and/or Nutrient Delivery:  Modify Tube Feeding(Increase EN to goal. Semi-elemental TF ( Vital 1.2) to 45 ml/hr x 24 hrs via OG. Increase water flush to 200 ml, 4 x day. Monitor for improvement in Na for further adjsustments in flush)  Nutrition Education/Counseling:  Education not indicated   Coordination of Nutrition Care:  Continue to monitor while inpatient    Goals:  new goals: EN to deliver kcals/protien within range of estimated needs, Na wnl       Nutrition Monitoring and Evaluation:     Food/Nutrient Intake Outcomes:  Enteral Nutrition Intake/Tolerance  Physical Signs/Symptoms Outcomes:  Weight, Hemodynamic Status, GI Status, Biochemical Data     Discharge Planning:     Too soon to determine     Electronically signed by Carmela Morin RD, LD on 3/22/21 at 11:29 AM EDT

## 2021-03-22 NOTE — CARE COORDINATION
Team ICU rounds done this am with Dr Tonya Mccall. Pt to have peripheral IV started and groin cvc dc'd. Pt continues on vent with cpap trial . Will follow to determine future plan and needs.

## 2021-03-22 NOTE — PROCEDURES
PROCEDURE:   Endotracheal intubation. INDICATIONS:   Acute Respiratory Failure. Post extubation upper airway stridor and respiratory distress    Consent   The  and daughter counseled regarding the procedure, it's indications, risks, potential complications and alternatives and any questions were answered. Consent was obtained. PROCEDURE SUMMARY:   Permit was implied secondary to emergent situation. A glide scope blade  was inserted into the oropharynx at which time the vocal cords were visualized. 7.5 Hebrew endotracheal tube was inserted and visualized going through the vocal cords. The stylette was removed. Colorimetric change was visualized on the CO2 meter. Breath sounds were heard in both lung fields equally. The endotracheal tube was placed at 23 cm, measured at the teeth. COMPLICATIONS:    None    ESTIMATED BLOOD LOSS:   None      Elida Hager 12:24 PM 3/22/2021 .

## 2021-03-23 LAB
ALBUMIN SERPL-MCNC: 2.7 G/DL (ref 3.5–4.6)
ALP BLD-CCNC: 109 U/L (ref 40–130)
ALT SERPL-CCNC: 87 U/L (ref 0–33)
ANION GAP SERPL CALCULATED.3IONS-SCNC: 10 MEQ/L (ref 9–15)
APTT: 27.8 SEC (ref 24.4–36.8)
AST SERPL-CCNC: 41 U/L (ref 0–35)
BASE EXCESS ARTERIAL: -2 (ref -3–3)
BASOPHILS ABSOLUTE: 0 K/UL (ref 0–0.2)
BASOPHILS RELATIVE PERCENT: 0.3 %
BILIRUB SERPL-MCNC: <0.2 MG/DL (ref 0.2–0.7)
BLOOD CULTURE, ROUTINE: NORMAL
BUN BLDV-MCNC: 36 MG/DL (ref 8–23)
CALCIUM IONIZED: 1.24 MMOL/L (ref 1.12–1.32)
CALCIUM SERPL-MCNC: 9 MG/DL (ref 8.5–9.9)
CHLORIDE BLD-SCNC: 112 MEQ/L (ref 95–107)
CO2: 20 MEQ/L (ref 20–31)
CREAT SERPL-MCNC: 1.81 MG/DL (ref 0.5–0.9)
EOSINOPHILS ABSOLUTE: 0 K/UL (ref 0–0.7)
EOSINOPHILS RELATIVE PERCENT: 0.1 %
GFR AFRICAN AMERICAN: 29
GFR AFRICAN AMERICAN: 32.8
GFR NON-AFRICAN AMERICAN: 24
GFR NON-AFRICAN AMERICAN: 27.1
GLOBULIN: 3.6 G/DL (ref 2.3–3.5)
GLUCOSE BLD-MCNC: 235 MG/DL (ref 70–99)
GLUCOSE BLD-MCNC: 239 MG/DL (ref 60–115)
GLUCOSE BLD-MCNC: 248 MG/DL (ref 60–115)
GLUCOSE BLD-MCNC: 250 MG/DL (ref 60–115)
GLUCOSE BLD-MCNC: 255 MG/DL (ref 60–115)
GLUCOSE BLD-MCNC: 266 MG/DL (ref 60–115)
GLUCOSE BLD-MCNC: 271 MG/DL (ref 60–115)
GLUCOSE BLD-MCNC: 282 MG/DL (ref 60–115)
HCO3 ARTERIAL: 23.1 MMOL/L (ref 21–29)
HCT VFR BLD CALC: 30.2 % (ref 37–47)
HEMOGLOBIN: 9.2 GM/DL (ref 12–16)
HEMOGLOBIN: 9.5 G/DL (ref 12–16)
INR BLD: 1.2
LACTATE: 0.64 MMOL/L (ref 0.4–2)
LYMPHOCYTES ABSOLUTE: 0.5 K/UL (ref 1–4.8)
LYMPHOCYTES RELATIVE PERCENT: 4.2 %
MAGNESIUM: 3.1 MG/DL (ref 1.7–2.4)
MCH RBC QN AUTO: 29.3 PG (ref 27–31.3)
MCHC RBC AUTO-ENTMCNC: 31.6 % (ref 33–37)
MCV RBC AUTO: 92.6 FL (ref 82–100)
MONOCYTES ABSOLUTE: 0.4 K/UL (ref 0.2–0.8)
MONOCYTES RELATIVE PERCENT: 3.1 %
NEUTROPHILS ABSOLUTE: 11.7 K/UL (ref 1.4–6.5)
NEUTROPHILS RELATIVE PERCENT: 92.3 %
O2 SAT, ARTERIAL: 98 % (ref 93–100)
PCO2 ARTERIAL: 38 MM HG (ref 35–45)
PDW BLD-RTO: 16.7 % (ref 11.5–14.5)
PERFORMED ON: ABNORMAL
PH ARTERIAL: 7.39 (ref 7.35–7.45)
PHOSPHORUS: 3.3 MG/DL (ref 2.3–4.8)
PLATELET # BLD: 291 K/UL (ref 130–400)
PO2 ARTERIAL: 109 MM HG (ref 75–108)
POC CHLORIDE: 108 MEQ/L (ref 99–110)
POC CREATININE: 2 MG/DL (ref 0.6–1.2)
POC FIO2: 35
POC HEMATOCRIT: 27 % (ref 36–48)
POC POTASSIUM: 4 MEQ/L (ref 3.5–5.1)
POC SAMPLE TYPE: ABNORMAL
POC SODIUM: 141 MEQ/L (ref 136–145)
POTASSIUM SERPL-SCNC: 4.4 MEQ/L (ref 3.4–4.9)
PROCALCITONIN: 0.52 NG/ML (ref 0–0.15)
PROTHROMBIN TIME: 15.3 SEC (ref 12.3–14.9)
RBC # BLD: 3.26 M/UL (ref 4.2–5.4)
REASON FOR REJECTION: NORMAL
REJECTED TEST: NORMAL
SODIUM BLD-SCNC: 142 MEQ/L (ref 135–144)
TCO2 ARTERIAL: 24 (ref 22–29)
TOTAL PROTEIN: 6.3 G/DL (ref 6.3–8)
WBC # BLD: 12.7 K/UL (ref 4.8–10.8)

## 2021-03-23 PROCEDURE — 83735 ASSAY OF MAGNESIUM: CPT

## 2021-03-23 PROCEDURE — 87077 CULTURE AEROBIC IDENTIFY: CPT

## 2021-03-23 PROCEDURE — 2500000003 HC RX 250 WO HCPCS: Performed by: INTERNAL MEDICINE

## 2021-03-23 PROCEDURE — 84295 ASSAY OF SERUM SODIUM: CPT

## 2021-03-23 PROCEDURE — 2580000003 HC RX 258: Performed by: INTERNAL MEDICINE

## 2021-03-23 PROCEDURE — 87186 SC STD MICRODIL/AGAR DIL: CPT

## 2021-03-23 PROCEDURE — 6370000000 HC RX 637 (ALT 250 FOR IP): Performed by: INTERNAL MEDICINE

## 2021-03-23 PROCEDURE — 6360000002 HC RX W HCPCS: Performed by: INTERNAL MEDICINE

## 2021-03-23 PROCEDURE — 82803 BLOOD GASES ANY COMBINATION: CPT

## 2021-03-23 PROCEDURE — 85014 HEMATOCRIT: CPT

## 2021-03-23 PROCEDURE — 84145 PROCALCITONIN (PCT): CPT

## 2021-03-23 PROCEDURE — 82435 ASSAY OF BLOOD CHLORIDE: CPT

## 2021-03-23 PROCEDURE — 99291 CRITICAL CARE FIRST HOUR: CPT | Performed by: INTERNAL MEDICINE

## 2021-03-23 PROCEDURE — 99233 SBSQ HOSP IP/OBS HIGH 50: CPT | Performed by: PSYCHIATRY & NEUROLOGY

## 2021-03-23 PROCEDURE — 94640 AIRWAY INHALATION TREATMENT: CPT

## 2021-03-23 PROCEDURE — 87040 BLOOD CULTURE FOR BACTERIA: CPT

## 2021-03-23 PROCEDURE — 83605 ASSAY OF LACTIC ACID: CPT

## 2021-03-23 PROCEDURE — 94761 N-INVAS EAR/PLS OXIMETRY MLT: CPT

## 2021-03-23 PROCEDURE — 82330 ASSAY OF CALCIUM: CPT

## 2021-03-23 PROCEDURE — 87086 URINE CULTURE/COLONY COUNT: CPT

## 2021-03-23 PROCEDURE — 2000000000 HC ICU R&B

## 2021-03-23 PROCEDURE — 2700000000 HC OXYGEN THERAPY PER DAY

## 2021-03-23 PROCEDURE — 94003 VENT MGMT INPAT SUBQ DAY: CPT

## 2021-03-23 PROCEDURE — 80053 COMPREHEN METABOLIC PANEL: CPT

## 2021-03-23 PROCEDURE — 85730 THROMBOPLASTIN TIME PARTIAL: CPT

## 2021-03-23 PROCEDURE — C9113 INJ PANTOPRAZOLE SODIUM, VIA: HCPCS | Performed by: INTERNAL MEDICINE

## 2021-03-23 PROCEDURE — 82565 ASSAY OF CREATININE: CPT

## 2021-03-23 PROCEDURE — 36415 COLL VENOUS BLD VENIPUNCTURE: CPT

## 2021-03-23 PROCEDURE — 94799 UNLISTED PULMONARY SVC/PX: CPT

## 2021-03-23 PROCEDURE — 85025 COMPLETE CBC W/AUTO DIFF WBC: CPT

## 2021-03-23 PROCEDURE — 82948 REAGENT STRIP/BLOOD GLUCOSE: CPT

## 2021-03-23 PROCEDURE — 85610 PROTHROMBIN TIME: CPT

## 2021-03-23 PROCEDURE — 36600 WITHDRAWAL OF ARTERIAL BLOOD: CPT

## 2021-03-23 PROCEDURE — 99233 SBSQ HOSP IP/OBS HIGH 50: CPT | Performed by: INTERNAL MEDICINE

## 2021-03-23 PROCEDURE — 84132 ASSAY OF SERUM POTASSIUM: CPT

## 2021-03-23 PROCEDURE — 84100 ASSAY OF PHOSPHORUS: CPT

## 2021-03-23 RX ORDER — METOCLOPRAMIDE HYDROCHLORIDE 5 MG/ML
10 INJECTION INTRAMUSCULAR; INTRAVENOUS EVERY 8 HOURS
Status: COMPLETED | OUTPATIENT
Start: 2021-03-23 | End: 2021-03-24

## 2021-03-23 RX ORDER — SODIUM CHLORIDE 9 MG/ML
10 INJECTION INTRAVENOUS DAILY
Status: DISCONTINUED | OUTPATIENT
Start: 2021-03-24 | End: 2021-04-02 | Stop reason: HOSPADM

## 2021-03-23 RX ORDER — INSULIN GLARGINE 100 [IU]/ML
10 INJECTION, SOLUTION SUBCUTANEOUS NIGHTLY
Status: DISCONTINUED | OUTPATIENT
Start: 2021-03-23 | End: 2021-03-24

## 2021-03-23 RX ORDER — METHYLPREDNISOLONE SODIUM SUCCINATE 40 MG/ML
40 INJECTION, POWDER, LYOPHILIZED, FOR SOLUTION INTRAMUSCULAR; INTRAVENOUS DAILY
Status: DISCONTINUED | OUTPATIENT
Start: 2021-03-23 | End: 2021-03-29

## 2021-03-23 RX ORDER — PANTOPRAZOLE SODIUM 40 MG/10ML
40 INJECTION, POWDER, LYOPHILIZED, FOR SOLUTION INTRAVENOUS DAILY
Status: DISCONTINUED | OUTPATIENT
Start: 2021-03-24 | End: 2021-04-02 | Stop reason: HOSPADM

## 2021-03-23 RX ADMIN — METOPROLOL TARTRATE 5 MG: 1 INJECTION, SOLUTION INTRAVENOUS at 18:20

## 2021-03-23 RX ADMIN — SODIUM CHLORIDE, PRESERVATIVE FREE 10 ML: 5 INJECTION INTRAVENOUS at 07:49

## 2021-03-23 RX ADMIN — METOPROLOL TARTRATE 5 MG: 1 INJECTION, SOLUTION INTRAVENOUS at 20:57

## 2021-03-23 RX ADMIN — Medication 10 ML: at 20:24

## 2021-03-23 RX ADMIN — SODIUM CHLORIDE 0.4 MCG/KG/HR: 9 INJECTION, SOLUTION INTRAVENOUS at 02:30

## 2021-03-23 RX ADMIN — PROPOFOL 15 MCG/KG/MIN: 10 INJECTION, EMULSION INTRAVENOUS at 18:25

## 2021-03-23 RX ADMIN — POLYETHYLENE GLYCOL 3350 17 G: 17 POWDER, FOR SOLUTION ORAL at 07:48

## 2021-03-23 RX ADMIN — NITROGLYCERIN 0.5 INCH: 20 OINTMENT TOPICAL at 23:45

## 2021-03-23 RX ADMIN — NITROGLYCERIN 0.5 INCH: 20 OINTMENT TOPICAL at 00:20

## 2021-03-23 RX ADMIN — METOCLOPRAMIDE HYDROCHLORIDE 10 MG: 5 INJECTION INTRAMUSCULAR; INTRAVENOUS at 10:06

## 2021-03-23 RX ADMIN — PANTOPRAZOLE SODIUM 40 MG: 40 INJECTION, POWDER, FOR SOLUTION INTRAVENOUS at 07:49

## 2021-03-23 RX ADMIN — METOPROLOL TARTRATE 5 MG: 1 INJECTION, SOLUTION INTRAVENOUS at 10:06

## 2021-03-23 RX ADMIN — PROPOFOL 20 MCG/KG/MIN: 10 INJECTION, EMULSION INTRAVENOUS at 07:48

## 2021-03-23 RX ADMIN — BUDESONIDE 500 MCG: 0.5 SUSPENSION RESPIRATORY (INHALATION) at 15:27

## 2021-03-23 RX ADMIN — NITROGLYCERIN 0.5 INCH: 20 OINTMENT TOPICAL at 18:20

## 2021-03-23 RX ADMIN — IPRATROPIUM BROMIDE AND ALBUTEROL SULFATE 1 AMPULE: .5; 3 SOLUTION RESPIRATORY (INHALATION) at 20:08

## 2021-03-23 RX ADMIN — MEROPENEM 1000 MG: 1 INJECTION, POWDER, FOR SOLUTION INTRAVENOUS at 20:26

## 2021-03-23 RX ADMIN — INSULIN GLARGINE 10 UNITS: 100 INJECTION, SOLUTION SUBCUTANEOUS at 20:41

## 2021-03-23 RX ADMIN — IPRATROPIUM BROMIDE AND ALBUTEROL SULFATE 1 AMPULE: .5; 3 SOLUTION RESPIRATORY (INHALATION) at 15:27

## 2021-03-23 RX ADMIN — HEPARIN SODIUM 5000 UNITS: 5000 INJECTION INTRAVENOUS; SUBCUTANEOUS at 20:41

## 2021-03-23 RX ADMIN — NITROGLYCERIN 0.5 INCH: 20 OINTMENT TOPICAL at 10:12

## 2021-03-23 RX ADMIN — METHYLPREDNISOLONE SODIUM SUCCINATE 40 MG: 40 INJECTION, POWDER, FOR SOLUTION INTRAMUSCULAR; INTRAVENOUS at 08:15

## 2021-03-23 RX ADMIN — DOCUSATE SODIUM 100 MG: 50 LIQUID ORAL at 20:24

## 2021-03-23 RX ADMIN — NITROGLYCERIN 0.5 INCH: 20 OINTMENT TOPICAL at 06:19

## 2021-03-23 RX ADMIN — HEPARIN SODIUM 5000 UNITS: 5000 INJECTION INTRAVENOUS; SUBCUTANEOUS at 13:33

## 2021-03-23 RX ADMIN — HEPARIN SODIUM 5000 UNITS: 5000 INJECTION INTRAVENOUS; SUBCUTANEOUS at 06:18

## 2021-03-23 RX ADMIN — IPRATROPIUM BROMIDE AND ALBUTEROL SULFATE 1 AMPULE: .5; 3 SOLUTION RESPIRATORY (INHALATION) at 10:20

## 2021-03-23 RX ADMIN — METOCLOPRAMIDE HYDROCHLORIDE 10 MG: 5 INJECTION INTRAMUSCULAR; INTRAVENOUS at 18:20

## 2021-03-23 RX ADMIN — Medication 10 ML: at 07:49

## 2021-03-23 RX ADMIN — IPRATROPIUM BROMIDE AND ALBUTEROL SULFATE 1 AMPULE: .5; 3 SOLUTION RESPIRATORY (INHALATION) at 04:21

## 2021-03-23 RX ADMIN — CHLORHEXIDINE GLUCONATE 15 ML: 1.2 RINSE ORAL at 20:24

## 2021-03-23 RX ADMIN — BUDESONIDE 500 MCG: 0.5 SUSPENSION RESPIRATORY (INHALATION) at 04:21

## 2021-03-23 RX ADMIN — PROPOFOL 30 MCG/KG/MIN: 10 INJECTION, EMULSION INTRAVENOUS at 23:52

## 2021-03-23 RX ADMIN — DOCUSATE SODIUM 100 MG: 50 LIQUID ORAL at 07:48

## 2021-03-23 RX ADMIN — CHLORHEXIDINE GLUCONATE 15 ML: 1.2 RINSE ORAL at 07:48

## 2021-03-23 RX ADMIN — METOPROLOL TARTRATE 5 MG: 1 INJECTION, SOLUTION INTRAVENOUS at 04:58

## 2021-03-23 ASSESSMENT — PULMONARY FUNCTION TESTS
PIF_VALUE: 10
PIF_VALUE: 17
PIF_VALUE: 21
PIF_VALUE: 15
PIF_VALUE: 9
PIF_VALUE: 16
PIF_VALUE: 17
PIF_VALUE: 18
PIF_VALUE: 9
PIF_VALUE: 11
PIF_VALUE: 17
PIF_VALUE: 14
PIF_VALUE: 10
PIF_VALUE: 19
PIF_VALUE: 10
PIF_VALUE: 16
PIF_VALUE: 17
PIF_VALUE: 9
PIF_VALUE: 9
PIF_VALUE: 14
PIF_VALUE: 20
PIF_VALUE: 21
PIF_VALUE: 25
PIF_VALUE: 16
PIF_VALUE: 15
PIF_VALUE: 13
PIF_VALUE: 12
PIF_VALUE: 14
PIF_VALUE: 20
PIF_VALUE: 12
PIF_VALUE: 15
PIF_VALUE: 13
PIF_VALUE: 10
PIF_VALUE: 14
PIF_VALUE: 12
PIF_VALUE: 14
PIF_VALUE: 13
PIF_VALUE: 15
PIF_VALUE: 17
PIF_VALUE: 17
PIF_VALUE: 15
PIF_VALUE: 9
PIF_VALUE: 16
PIF_VALUE: 13
PIF_VALUE: 17
PIF_VALUE: 15
PIF_VALUE: 25
PIF_VALUE: 11
PIF_VALUE: 11
PIF_VALUE: 14

## 2021-03-23 ASSESSMENT — PAIN SCALES - GENERAL
PAINLEVEL_OUTOF10: 0

## 2021-03-23 NOTE — PROGRESS NOTES
Department of Internal Medicine  General Internal Medicine  Attending Progress Note      SUBJECTIVE:  Pt seen and examined. Intubated and sedated. Off pressors.  Minimal vent settings    OBJECTIVE      Medications    Current Facility-Administered Medications: methylPREDNISolone sodium (SOLU-MEDROL) injection 40 mg, 40 mg, Intravenous, Daily  insulin glargine (LANTUS) injection vial 10 Units, 10 Units, Subcutaneous, Nightly  metoclopramide (REGLAN) injection 10 mg, 10 mg, Intravenous, Q8H  [START ON 3/24/2021] pantoprazole (PROTONIX) injection 40 mg, 40 mg, Intravenous, Daily **AND** [START ON 3/24/2021] sodium chloride (PF) 0.9 % injection 10 mL, 10 mL, Intravenous, Daily  metoprolol (LOPRESSOR) injection 5 mg, 5 mg, Intravenous, Q6H  nitroglycerin (NITRO-BID) 2 % ointment 0.5 inch, 0.5 inch, Topical, 4 times per day  heparin (porcine) injection 5,000 Units, 5,000 Units, Subcutaneous, 3 times per day  dexmedetomidine (PRECEDEX) 400 mcg in sodium chloride 0.9 % 100 mL infusion, 0.2-1.4 mcg/kg/hr, Intravenous, Continuous  docusate (COLACE) 50 MG/5ML liquid 100 mg, 100 mg, Oral, BID  polyethylene glycol (GLYCOLAX) packet 17 g, 17 g, Oral, Daily  budesonide (PULMICORT) nebulizer suspension 500 mcg, 0.5 mg, Nebulization, BID  insulin lispro (HUMALOG) injection vial 0-18 Units, 0-18 Units, Subcutaneous, Q6H  morphine (PF) injection 2 mg, 2 mg, Intravenous, Q2H PRN  hydrALAZINE (APRESOLINE) injection 10 mg, 10 mg, Intravenous, Q4H PRN **OR** hydrALAZINE (APRESOLINE) injection 20 mg, 20 mg, Intravenous, Q4H PRN  glucose (GLUTOSE) 40 % oral gel 15 g, 15 g, Oral, PRN  dextrose 50 % IV solution, 12.5 g, Intravenous, PRN  glucagon (rDNA) injection 1 mg, 1 mg, Intramuscular, PRN  dextrose 5 % solution, 100 mL/hr, Intravenous, PRN  labetalol (NORMODYNE;TRANDATE) injection 20 mg, 20 mg, Intravenous, Q4H PRN  labetalol (NORMODYNE;TRANDATE) injection 40 mg, 40 mg, Intravenous, Q4H PRN  propofol injection, 5-50 mcg/kg/min, Intravenous, Titrated  promethazine (PHENERGAN) tablet 12.5 mg, 12.5 mg, Oral, Q6H PRN **OR** ondansetron (ZOFRAN) injection 4 mg, 4 mg, Intravenous, Q6H PRN  chlorhexidine (PERIDEX) 0.12 % solution 15 mL, 15 mL, Mouth/Throat, BID  sodium chloride flush 0.9 % injection 10 mL, 10 mL, Intravenous, 2 times per day  sodium chloride flush 0.9 % injection 10 mL, 10 mL, Intravenous, PRN  acetaminophen (TYLENOL) tablet 650 mg, 650 mg, Oral, Q6H PRN **OR** acetaminophen (TYLENOL) suppository 650 mg, 650 mg, Rectal, Q6H PRN  ipratropium-albuterol (DUONEB) nebulizer solution 1 ampule, 1 ampule, Inhalation, 4x daily  Physical    VITALS:  BP (!) 165/62   Pulse 95   Temp 97.4 °F (36.3 °C) (Bladder)   Resp 21   Ht 4' 11\" (1.499 m)   Wt 147 lb 4.3 oz (66.8 kg)   LMP  (LMP Unknown)   SpO2 100%   BMI 29.74 kg/m²   Constitutional: intubated and sedated  Head: Normocephalic, atraumatic  ENT: moist mucous membranes, ETT in place  Neck: neck supple, trachea midline  Lungs: Good inspiratory effort, bilateral rhonchi and crackles  Heart: RRR, normal S1 and S2  GI: Soft, non-distended, +BS  MSK: no edema noted  Skin: warm, dry     Data    CBC:   Lab Results   Component Value Date    WBC 12.7 03/23/2021    RBC 3.26 03/23/2021    HGB 9.5 03/23/2021    HCT 30.2 03/23/2021    MCV 92.6 03/23/2021    MCH 29.3 03/23/2021    MCHC 31.6 03/23/2021    RDW 16.7 03/23/2021     03/23/2021    MPV 8.8 08/01/2015     CMP:    Lab Results   Component Value Date     03/23/2021    K 4.4 03/23/2021    K 4.4 10/19/2020     03/23/2021    CO2 20 03/23/2021    BUN 36 03/23/2021    CREATININE 1.81 03/23/2021    GFRAA 32.8 03/23/2021    LABGLOM 27.1 03/23/2021    GLUCOSE 235 03/23/2021    PROT 6.3 03/23/2021    LABALBU 2.7 03/23/2021    CALCIUM 9.0 03/23/2021    BILITOT <0.2 03/23/2021    ALKPHOS 109 03/23/2021    AST 41 03/23/2021    ALT 87 03/23/2021       ASSESSMENT AND PLAN      # Cardiac arrest s/p ROSC   - initially required Dopamine, norepinephrine and epinephrine infusion  - off pressors and hypothermia protocol  - TTE showed LVEF of 50%  - cardiology following     # Acute hypoxic respiratory failure  - requiring intubation and mechanical ventilation  - extubated 3/22, but had to be re-intubated same day due to stridor and respiratory distress. - management per critical care service     # Acute encephalopathy  - was following commands and awake and alert during SBT   - likely hypoxic-ischemic etiology from cardiac arrest  - CT head was negative per report  - neurology following     # Acute / chronic anemia   - with Hb of 5.8 on arrival, FOBT was positive  - improved s/p PRBCs given in ED  - on Protonix   - follow H/H      # Lactic acidosis  - in the setting of cardiac arrest  - improved with volume repletion     # Hyperkalemia  - resolved     # SAÚL/CKD3  - nephrology following     # IDDM2 with hyperglycemia  - improved, off insulin drip, on ISS     # Leukocytosis   - improved     Code status - DNRCCA    Disposition: Remains intubated and sedated. Will continue steroids, abx. Pulm consulted.        Xavier Landau,   Internal Medicine

## 2021-03-23 NOTE — PROGRESS NOTES
1900 Bedside shift report received from [facility], Head to toe complete, see notes    0000 Dr Halima Leija notified of patients tube feed residual, ordered tube feed held for 3 hours, then restated at 25 m/L hour. 0400 Dr. Kady Lu notified of high residual, verbal orders to hold feed and flushes untill morning residual check.

## 2021-03-23 NOTE — PROGRESS NOTES
PHARMACY NOTE:    Merrem 1 g IV every 8 hours changed to Merrem 1 g IV every 12 hours due to a CrCl of < 30 mL/min. CREATININE   Date Value Ref Range Status   03/23/2021 1.81 (H) 0.50 - 0.90 mg/dL Final     POC Creatinine   Date Value Ref Range Status   03/23/2021 2.0 (H) 0.6 - 1.2 mg/dL Final     Calculated CrCl = 27.88 mL/min    Pharmacy will continue to monitor renal function daily.     Delfina Coles, PharmD   3/23/2021 6:42 PM

## 2021-03-23 NOTE — PROGRESS NOTES
Nephrology Progress Note    Assessment:  SAÚL cardiac arrest -home non oliguricEncephalopathy  DM type-2  Hx Hypertension        Plan: future decisions based on CNS    Patient Active Problem List:     Hypertension     Hyperlipidemia     Uncontrolled type 2 diabetes mellitus with complication, without long-term current use of insulin ([facility])     Fibromyalgia     Anxiety     Smoker     Insomnia     DJD (degenerative joint disease), lumbar     Lumbosacral radiculopathy at S1     DDD (degenerative disc disease), lumbar     Dysphonia     CTS (carpal tunnel syndrome)     High risk medication use-Norco - 12/20/17 OARRS PM&R, 02/20/18 OARRS PM&R, 03/07/18 OARRS PM&R, Urine Drug screen negative 02/06/17 PM&R--MED CONTRACT 2/6/17     SOB (shortness of breath) on exertion     Chest pain     Memory deficit     Artificial lens present     Presbyopia     Astigmatism, regular     Cataract, nuclear sclerotic senile     IDDM (insulin dependent diabetes mellitus)     Regular astigmatism     Nuclear senile cataract     Neck pain     Lumbosacral radiculopathy at L5     Spinal stenosis of lumbar region with neurogenic claudication     Impaired mobility and activities of daily living     Non-compliant patient NO showed FU 1/10/17 Dr Rajat Simmons     Insomnia secondary to chronic pain     Reactive depression     Diabetic radiculopathy (HCC)     Cervical radicular pain     Diabetic asymmetric polyneuropathy ([facility])     Myalgia     Intercostal neuropathy     Osteoarthritis of spine with radiculopathy, lumbar region     Acute combined systolic and diastolic CHF, NYHA class 1 ([facility])     PMB (postmenopausal bleeding)     Acute on chronic diastolic heart failure (HCC)     CHF (congestive heart failure) ([facility])     Hypertensive urgency     Uncontrolled type 2 diabetes mellitus with hyperglycemia ([facility])     Chronic obstructive pulmonary disease with acute exacerbation (HCC)     Acute on chronic diastolic (congestive) heart failure ([facility])     SAÚL (acute kidney injury) ([facility]     Hypoglycemia     HOCM (hypertrophic obstructive cardiomyopathy) (HCC)     Gastrointestinal hemorrhage     COPD exacerbation (HCC)     Anemia     AVM (arteriovenous malformation)     Symptomatic anemia     Flash pulmonary edema (HCC)     Acute on chronic kidney failure (HCC)     Dysarthria     Chronic fatigue     Acute encephalopathy     Adenomatous polyp of sigmoid colon     Adenomatous polyp of transverse colon     Sepsis (HCC)     Major depressive disorder in remission (HCC)     Gastroesophageal reflux disease without esophagitis     Acute cystitis without hematuria     CKD (chronic kidney disease) stage 4, GFR 15-29 ml/min (HCC)     Cardiopulmonary arrest (HCC)     Gait abnormality     Late effects of CVA (cerebrovascular accident)     Cardiac arrest ([facility]     Acute respiratory failure with hypoxia (HCC)      Subjective:  Admit Date: 3/18/2021    Interval History: no response  On ventiltor    Medications:  Scheduled Meds:   methylPREDNISolone  40 mg Intravenous Daily    insulin glargine  10 Units Subcutaneous Nightly    metoclopramide  10 mg Intravenous Q8H    [START ON 3/24/2021] pantoprazole  40 mg Intravenous Daily    And    [START ON 3/24/2021] sodium chloride (PF)  10 mL Intravenous Daily    metoprolol  5 mg Intravenous Q6H    nitroglycerin  0.5 inch Topical 4 times per day    heparin (porcine)  5,000 Units Subcutaneous 3 times per day    docusate  100 mg Oral BID    polyethylene glycol  17 g Oral Daily    budesonide  0.5 mg Nebulization BID    insulin lispro  0-18 Units Subcutaneous Q6H    chlorhexidine  15 mL Mouth/Throat BID    sodium chloride flush  10 mL Intravenous 2 times per day    ipratropium-albuterol  1 ampule Inhalation 4x daily     Continuous Infusions:   dexmedetomidine 0.4 mcg/kg/hr (03/23/21 0230)    dextrose      propofol Stopped (03/23/21 0915)       CBC:   Recent Labs     03/22/21  0530 03/23/21  0757   WBC 9.6 12.7*   HGB 8.7* 9.5*    291 CMP:    Recent Labs     03/21/21  0528 03/22/21  0527 03/23/21  0648    145* 142   K 4.0 3.9 4.4   * 111* 112*   CO2 23 25 20   BUN 37* 31* 36*   CREATININE 2.04* 1.88* 1.81*   GLUCOSE 213* 185* 235*   CALCIUM 8.3* 8.5 9.0   LABGLOM 23.6* 26.0* 27.1*     Troponin: No results for input(s): TROPONINI in the last 72 hours. BNP: No results for input(s): BNP in the last 72 hours. INR:   Recent Labs     03/23/21  0649   INR 1.2     Lipids: No results for input(s): CHOL, LDLDIRECT, TRIG, HDL, AMYLASE, LIPASE in the last 72 hours. Liver:   Recent Labs     03/23/21  0648   AST 41*   ALT 87*   ALKPHOS 109   PROT 6.3   LABALBU 2.7*   BILITOT <0.2     Iron:  No results for input(s): IRONS, FERRITIN in the last 72 hours. Invalid input(s): LABIRONS  Urinalysis: No results for input(s): UA in the last 72 hours.     Objective:  Vitals: BP (!) 148/59   Pulse 69   Temp 97 °F (36.1 °C) (Bladder) Comment: 97.6 orally  Resp 16   Ht 4' 11\" (1.499 m)   Wt 147 lb 4.3 oz (66.8 kg)   LMP  (LMP Unknown)   SpO2 100%   BMI 29.74 kg/m²    Wt Readings from Last 3 Encounters:   03/21/21 147 lb 4.3 oz (66.8 kg)   01/15/21 131 lb (59.4 kg)   11/27/20 120 lb (54.4 kg)      24HR INTAKE/OUTPUT:      Intake/Output Summary (Last 24 hours) at 3/23/2021 1108  Last data filed at 3/23/2021 0745  Gross per 24 hour   Intake 1712.81 ml   Output 825 ml   Net 887.81 ml       General:notalert, in no apparent distress  HEENT: normocephalic, atraumatic, anicteric  Neck: supple, no mass  ET in place  Lungs: non-labored respirations, clear to auscultation bilaterally  Heart: regular rate and rhythm, no murmurs or rubs  Abdomen: soft, non-tender, non-distended  Ext: no cyanosis, no peripheral edema  Neuro: alert and oriented, no gross abnormalities  Psych: normal mood and affect cannot assess  Skin: no rash      Electronically signed by Claudia Bae MD

## 2021-03-23 NOTE — PROGRESS NOTES
Progress Note  Patient: Alesha Stevens  Unit/Bed: IC05/IC05-01  YOB: 1945  MRN: [id number]  Acct: [de-identified]   Admitting Diagnosis: Cardiac arrest [facility]) [I46.9]  Admit Date:  3/18/2021  Hospital Day: 5    Chief Complaint: CPA    Histories:  Past Medical History:   Diagnosis Date    Anxiety     Asthma     dx 2019 / has smoked since age 14    CHF (congestive heart failure) (HCC)     Chronic back pain     Bilateral L5 S1 Radic on emg--surprisingly worse on the left than the right--pt's symptoms and her MRI show worse on the right    Chronic obstructive pulmonary disease with acute exacerbation ([facility] 10/12/2019    Depression     Fibromyalgia     Hyperlipidemia     meds > 8 yrs    Hypertension     meds > 45 yrs    On home O2     2l per n/c at bedtime mostly,     Osteoarthritis     Type II diabetes mellitus, uncontrolled ([facility]     hx > 8 yrs    Unspecified sleep apnea      Past Surgical History:   Procedure Laterality Date    BACK SURGERY  2017    lumbar disc    CARDIAC CATHETERIZATION  11/3/14     DR. MIRELES / no stents    COLONOSCOPY  08/29/2016    w/polypectomy     COLONOSCOPY N/A 9/29/2020    COLONOSCOPY WITH POLYPECTOMY performed by Elin Vidales MD at [facility] N/A 10/7/2019    EUA HYSTEROSCOPY DILATATION AND CURETTAGE performed by Silverio Eisenmenger, DO at [address] [place], COLON, DIAGNOSTIC      EYE SURGERY      [place] with IOL OU / [address] [place]  [address]    umbilical hernia repair    LA ESOPHAGOGASTRODUODENOSCOPY TRANSORAL DIAGNOSTIC N/A 3/24/2017    EGD ESOPHAGOGASTRODUODENOSCOPY performed by Niecy Chun MD at [place] [address] 2/8/2018    negative findings    LA REVISE MEDIAN N/CARPAL TUNNEL SURG Left 6/5/2017    LEFT  CARPAL TUNNEL RELEASE performed by Scott Borrero MD at [facility],2+K/L,HLLXQ N/A 2/8/2018    TONSILLECTOMY      as child  UPPER GASTROINTESTINAL ENDOSCOPY  2016    w/bx     UPPER GASTROINTESTINAL ENDOSCOPY N/A 2020    EGD possible biopsy performed by Kenji [facility]re MD at [facility] N/A 2020    EGD PUSH ENTEROSCOPY performed by Eben Forte MD at [facility]     Family History   Problem Relation Age of Onset    Heart Disease Father         cardiac bypass    Arthritis Father     Arthritis Mother     Other Mother          at age 80    Other Sister         nuknown health hx    No Known Problems Daughter     Stroke Son      Social History     Socioeconomic History    Marital status:      Spouse name: Savana Marcus Number of children: 2    Years of education: 15    Highest education level: High school graduate   Occupational History    Occupation: Retired-   Social Needs    Financial resource strain: Not hard at all   [place] [place] insecurity     Worry: Never true     Inability: Never true    Transportation needs     Medical: No     Non-medical: No   Tobacco Use    Smoking status: Former Smoker     Packs/day: 1.00     Years: 59.00     Pack years: 59.00     Types: Cigarettes     Start date: 2017    Smokeless tobacco: Never Used    Tobacco comment: quit 2-3 weeks ago    Substance and Sexual Activity    Alcohol use: Not Currently    Drug use: No    Sexual activity: Yes     Partners: Male   Lifestyle    Physical activity     Days per week: 0 days     Minutes per session: 0 min    Stress: Only a little   Relationships    Social connections     Talks on phone: More than three times a week     Gets together: More than three times a week     Attends Confucianism service: 1 to 4 times per year     Active member of club or organization: No     Attends meetings of clubs or organizations: Never     Relationship status: Living with partner   Michaelle Corea Intimate partner violence     Fear of current or ex partner: No     Emotionally abused:  No Physically abused: No     Forced sexual activity: No   Other Topics Concern    None   Social History Narrative    Grew up in [place]     Lives With:  [address]: Two level, Performs ADL's on one level    Home Access: Stairs to enter with rails    Entrance Stairs - Number of Steps: 4    Bathroom Shower/Tub: Tub/Shower unit, Doors    Bathroom Equipment: Shower chair, Grab bars in [address] [place]: Rolling walker, Cane, Oxygen(uses her O2 very seldom)    ADL Assistance: Needs assistance    Homemaking Assistance: Needs assistance    Homemaking Responsibilities: No(spouse performs)    Ambulation Assistance: Independent    Transfer Assistance: Independent    Active : No    Occupation: Retired    Type of occupation: worked in [place]: patient enjoys televsision       Subjective/HPI  in room. Off sedatives and will try CPAP for weaning. EKG: ST 82        Review of Systems:   Review of Systems  vented    Physical Examination:    BP (!) 148/59   Pulse 69   Temp 97 °F (36.1 °C) (Bladder) Comment: 97.6 orally  Resp 16   Ht 4' 11\" (1.499 m)   Wt 147 lb 4.3 oz (66.8 kg)   LMP  (LMP Unknown)   SpO2 100%   BMI 29.74 kg/m²    Physical Exam   Constitutional: She appears healthy. No distress. HENT:   Normal cephalic and Atraumatic   Eyes: Pupils are equal, round, and reactive to light. Neck: Normal range of motion and thyroid normal. Neck supple. No JVD present. No neck adenopathy. No thyromegaly present. Cardiovascular: Normal rate, regular rhythm, intact distal pulses and normal pulses. Murmur heard. Pulmonary/Chest: Effort normal and breath sounds normal. She has no wheezes. She has no rales. She exhibits no tenderness. Abdominal: Soft. Bowel sounds are normal. There is no abdominal tenderness. Musculoskeletal: Normal range of motion. General: No tenderness or edema.    Neurological: She is alert and oriented to person, place, and time. Skin: Skin is warm. No cyanosis. Nails show no clubbing.        LABS:  CBC:   Lab Results   Component Value Date    WBC 12.7 03/23/2021    RBC 3.26 03/23/2021    HGB 9.5 03/23/2021    HCT 30.2 03/23/2021    MCV 92.6 03/23/2021    MCH 29.3 03/23/2021    MCHC 31.6 03/23/2021    RDW 16.7 03/23/2021     03/23/2021    MPV 8.8 08/01/2015     CBC with Differential:    Lab Results   Component Value Date    WBC 12.7 03/23/2021    RBC 3.26 03/23/2021    HGB 9.5 03/23/2021    HCT 30.2 03/23/2021     03/23/2021    MCV 92.6 03/23/2021    MCH 29.3 03/23/2021    MCHC 31.6 03/23/2021    RDW 16.7 03/23/2021    NRBC 1 08/28/2020    LYMPHOPCT 4.2 03/23/2021    MONOPCT 3.1 03/23/2021    BASOPCT 0.3 03/23/2021    MONOSABS 0.4 03/23/2021    LYMPHSABS 0.5 03/23/2021    EOSABS 0.0 03/23/2021    BASOSABS 0.0 03/23/2021     CMP:    Lab Results   Component Value Date     03/23/2021    K 4.4 03/23/2021    K 4.4 10/19/2020     03/23/2021    CO2 20 03/23/2021    BUN 36 03/23/2021    CREATININE 1.81 03/23/2021    GFRAA 32.8 03/23/2021    LABGLOM 27.1 03/23/2021    GLUCOSE 235 03/23/2021    PROT 6.3 03/23/2021    LABALBU 2.7 03/23/2021    CALCIUM 9.0 03/23/2021    BILITOT <0.2 03/23/2021    ALKPHOS 109 03/23/2021    AST 41 03/23/2021    ALT 87 03/23/2021     BMP:    Lab Results   Component Value Date     03/23/2021    K 4.4 03/23/2021    K 4.4 10/19/2020     03/23/2021    CO2 20 03/23/2021    BUN 36 03/23/2021    LABALBU 2.7 03/23/2021    CREATININE 1.81 03/23/2021    CALCIUM 9.0 03/23/2021    GFRAA 32.8 03/23/2021    LABGLOM 27.1 03/23/2021    GLUCOSE 235 03/23/2021     Magnesium:    Lab Results   Component Value Date    MG 3.1 03/23/2021     Troponin:    Lab Results   Component Value Date    TROPONINI 0.056 03/19/2021        Active Hospital Problems    Diagnosis Date Noted    Acute respiratory failure with hypoxia ([facility] [J96.01]      Priority: Low    Cardiac arrest ([facility] [I46.9] 03/18/2021     Priority: Low        Assessment/Plan:  1. CPA   2. CAD - LAD mild  3. LVEF 55%  4. Echo Severe LVH. 1-2+ MR RVSP 31 mmHg. LVEF 50%  5. History of HCM - need to Keep HR close to 60s. Add iv BB. Will also add Nitrates - post extubation will adjust CV meds. 6. SAÚL - Nephrology following- gradually improving.         Electronically signed by Holli Green MD on 3/23/2021 at 10:40 AM

## 2021-03-23 NOTE — PROGRESS NOTES
0745: pt remains sedated on propofol and precedex, see MAR, pupils equal and reactive, RASS -2 to -3. OG marked at 61 cm attached to ETT, ETT 23 at the teeth, vent settings , PEEP 5, 35% RESPS 18.  Spo@! 100%. Residual on OG was 150, gave 80 more ml water with medications and TF remains off until talking with Miguel huang. Lung sounds left side upper lobe crackles noted, base diminished, right side clear t/o, slightly diminished base. Bowel sounds active and passing gas. Slight edema t/o extremities. Elevated on pillows, heel mepilexes intact to coccyx and placed on heels. Heels floated on off bed, prevalon boots intact and scd's intact and functioning. Sanchez draining clear yellow malodorous urine. Call to lab to please come draw cbc, prior one unable to be run. Will ask if dr. Miguel Palmer believes picc is needed. 5596: cbc redrawn, waiting on results. 0940: new orders obtained in rounds, stopped propofol see MAR for time to wake pt up for SBT    1329:placed on cpap mode, pt lethargic but becomes easily aggravated when moved. Opens eyes. 1835: pt spiked fever 100.2, will notify dr. Maki Loco: new orders obtained for urine culture, blood culures, resp cultures and merrem.

## 2021-03-23 NOTE — PROGRESS NOTES
Neurology Follow up    SUBJECTIVE: Patient remains intubated on light sedation. She does awaken with name but does not follow any commands. Sedation was discontinued for a brief period of time and she moves all extremities but did not follow commands according to the nurse  Patient was briefly extubated yesterday though she did not sustain and she was reintubated. At that time it was reported to me that she was following commands.     Current Facility-Administered Medications   Medication Dose Route Frequency Provider Last Rate Last Admin    methylPREDNISolone sodium (SOLU-MEDROL) injection 40 mg  40 mg Intravenous Daily Jo-Ann Ruano MD   40 mg at 03/23/21 0815    insulin glargine (LANTUS) injection vial 10 Units  10 Units Subcutaneous Nightly Jo-Ann Ruano MD        metoclopramide (REGLAN) injection 10 mg  10 mg Intravenous Q8H Jo-Ann Ruano MD   10 mg at 03/23/21 1006    [START ON 3/24/2021] pantoprazole (PROTONIX) injection 40 mg  40 mg Intravenous Daily Jo-Ann Ruano MD        And    [START ON 3/24/2021] sodium chloride (PF) 0.9 % injection 10 mL  10 mL Intravenous Daily Jo-Ann Ruano MD   10 mL at 03/23/21 0749    metoprolol (LOPRESSOR) injection 5 mg  5 mg Intravenous Q6H Pablo Moreno MD   5 mg at 03/23/21 1006    nitroglycerin (NITRO-BID) 2 % ointment 0.5 inch  0.5 inch Topical 4 times per day Pablo Moreno MD   0.5 inch at 03/23/21 1012    heparin (porcine) injection 5,000 Units  5,000 Units Subcutaneous 3 times per day Jo-Ann Ruano MD   5,000 Units at 03/23/21 0618    dexmedetomidine (PRECEDEX) 400 mcg in sodium chloride 0.9 % 100 mL infusion  0.2-1.4 mcg/kg/hr Intravenous Continuous Jo-Ann Ruano MD 6.7 mL/hr at 03/23/21 0230 0.4 mcg/kg/hr at 03/23/21 0230    docusate (COLACE) 50 MG/5ML liquid 100 mg  100 mg Oral BID Jo-Ann Ruano MD   100 mg at 03/23/21 0748    polyethylene glycol (GLYCOLAX) packet 17 g  17 g Oral Daily Jo-Ann Ruano MD   17 g at 03/23/21 0578  budesonide (PULMICORT) nebulizer suspension 500 mcg  0.5 mg Nebulization BID Raquel Lopez MD   500 mcg at 03/23/21 0421    insulin lispro (HUMALOG) injection vial 0-18 Units  0-18 Units Subcutaneous Q6H Beni D Sedar, DO   6 Units at 03/23/21 1015    morphine (PF) injection 2 mg  2 mg Intravenous Q2H PRN Pamalee Charlotte, DO        hydrALAZINE (APRESOLINE) injection 10 mg  10 mg Intravenous Q4H PRN Bartholomew Chant Sedar, DO   10 mg at 03/19/21 0606    Or    hydrALAZINE (APRESOLINE) injection 20 mg  20 mg Intravenous Q4H PRN Bartholomew Chant Sedar, DO        glucose (GLUTOSE) 40 % oral gel 15 g  15 g Oral PRN Bartholomew Chant Sedar, DO        dextrose 50 % IV solution  12.5 g Intravenous PRN Bartholomew Chant Sedar, DO   12.5 g at 03/20/21 0438    glucagon (rDNA) injection 1 mg  1 mg Intramuscular PRN Bartholomew Chant Sedar, DO        dextrose 5 % solution  100 mL/hr Intravenous PRN Bartholomew Chant Sedar, DO        labetalol (NORMODYNE;TRANDATE) injection 20 mg  20 mg Intravenous Q4H PRN Pamjuliane Charlotte, DO   20 mg at 03/19/21 1701    labetalol (NORMODYNE;TRANDATE) injection 40 mg  40 mg Intravenous Q4H PRN Pamnima Ellisons, DO   40 mg at 03/19/21 0827    propofol injection  5-50 mcg/kg/min Intravenous Titrated Pamjuliane Charlotte, DO   Stopped at 03/23/21 0915    promethazine (PHENERGAN) tablet 12.5 mg  12.5 mg Oral Q6H PRN Gary Lawrence MD        Or    ondansetron (ZOFRAN) injection 4 mg  4 mg Intravenous Q6H PRN Gary Lawrence MD        chlorhexidine (PERIDEX) 0.12 % solution 15 mL  15 mL Mouth/Throat BID Gary Lawrence MD   15 mL at 03/23/21 0748    sodium chloride flush 0.9 % injection 10 mL  10 mL Intravenous 2 times per day Gary Lawrence MD   10 mL at 03/23/21 0749    sodium chloride flush 0.9 % injection 10 mL  10 mL Intravenous PRN Gary Lawrence MD        acetaminophen (TYLENOL) tablet 650 mg  650 mg Oral Q6H PRN Gary Lawrence MD        Or    acetaminophen (TYLENOL) suppository 650 mg  650 mg Rectal Q6H PRN Gary Lawrence MD       [address] [place] [address] ipratropium-albuterol (DUONEB) nebulizer solution 1 ampule  1 ampule Inhalation 4x daily Teddy Mccarty MD   1 ampule at 03/23/21 1020       PHYSICAL EXAM:    BP (!) 148/59   Pulse 69   Temp 97 °F (36.1 °C) (Bladder) Comment: 97.6 orally  Resp 16   Ht 4' 11\" (1.499 m)   Wt 147 lb 4.3 oz (66.8 kg)   LMP  (LMP Unknown)   SpO2 100%   BMI 29.74 kg/m²   General Appearance:      Skin:  normal  CVS - Normal sounds, No murmurs , No carotid Bruits  RS -CTA  Abdomen Soft, BS present  Review of Systems   Mental Status Exam:             She is lethargic due to sedation  Funduscopic Exam:     Cranial Nerves            Cranial nerve III           Pupils:  equal, round, reactive to light      Cranial nerves III, IV, VI           Extraocular Movements: intact        Motor: Unable to perform examination but she does withdraw all her extremities to pain            Sensory: Unable to perform        Pinprick             Right Upper Extremity:  normal             Left Upper Extremity:  normal             Right Lower Extremity:  normal             Left Lower Extremity:  normal           Vibration                         Touch            Proprioception                 Coordination:           Finger/Nose   Right:  normal              Left:  normal          Heel-Knee-Shin                Right:  normal              Left:  normal          Rapid Alternating Movements              Right:  normal              Left:  normal          Gait:                       Casual:  normal                         Romberg:  normal            Reflexes:             Deep Tendon Reflexes:             Reflexes are 2 +             Plantar response:                Right:  downgoing               Left:  downgoing    Vascular:  Cardiac Exam:  normal         Echocardiogram Complete 2d With Doppler With Color    Result Date: 3/19/2021  Transthoracic Echocardiography Report (TTE)  Demographics  Patient Name   Sarah Ramos  Gender              Female   Patient Number [id number]        Race                Black                                 Ethnicity  Visit Number   [id number]       Room Number         IC05  Corporate ID                   Date of Study       03/19/2021  Accession      [id number]      Referring Physician  Number  Date of Birth  1945      Sonographer         Adrián Oliveira LPN  Age            67 year(s)      [facility] [place] [address] Cardiology                                 Physician           Katerina Genao MD Procedure Type of Study  TTE procedure:ECHO COMPLETE 2D W/DOP W/COLOR. Procedure Date Date: 03/19/2021 Start: 09:15 AM Study Location: Portable Technical Quality: Good visualization Indications:Cardiac arrest. Patient Status: Routine Weight: 143 pounds BP: 89/48 mmHg Allergies   - Other allergy:(Darvon). Conclusions  Summary  Mitral annular calcification is present. 1-2+ MR  Normal tricuspid valve structure and function. 1+ TR  RVSP 31 mmHg  severe CLVH  LVEF 50%  E/A flow reversal noted. Suggestive of diastolic dysfunction. Signature  ----------------------------------------------------------------  Electronically signed by Katerina Genao MD(Interpreting  physician) on 03/19/2021 02:13 PM  ----------------------------------------------------------------  Findings Left Ventricle severe CLVH LVEF 50% E/A flow reversal noted. Suggestive of diastolic dysfunction. Right Ventricle Normal right ventricle structure and function. Normal right ventricle systolic pressure. Left Atrium Moderately dilated left atrium. Right Atrium Normal right atrium. Mitral Valve Mitral annular calcification is present. 1-2+ MR Tricuspid Valve Normal tricuspid valve structure and function. 1+ TR RVSP 31 mmHg Aortic Valve Normal aortic valve structure and function. Pulmonic Valve The pulmonic valve was not well visualized . Pericardial Effusion No evidence of pericardial effusion.  Pleural Effusion No evidence of pleural effusion. Aorta \ Miscellaneous The aorta is within normal limits. M-Mode Measurements (cm)  LVIDd: 4.01 cm                         LVIDs: 1.85 cm  IVSd: 1.45 cm                          IVSs: 1.23 cm  LVPWd: 1.32 cm                         LVPWs: 1.61 cm  Rt. Vent.  Dimension: 1.07 cm           AO Root Dimension: 2.8 cm                                         ACS: 1.47 cm                                         LA: 3.04 cm                                         LVOT: 1.98 cm Doppler Measurements:  AV Velocity:0.01 m/s                   MV Peak E-Wave: 0.96 m/s  AV Peak Gradient: 7.73 mmHg            MV Peak A-Wave: 1.58 m/s  AV Mean Gradient: 3.01 mmHg  AV Area (Continuity):1.27 cm^2         MV P1/2t: 105.6 msec  TR Velocity:2.29 m/s                   MVA by PHT2.08 cm^2  TR Gradient:21.01 mmHg                 Estimated RAP:10 mmHg                                         RVSP:31 mmHg Valves  Mitral Valve  Peak E-Wave: 0.96 m/s          Peak A-Wave: 1.58 m/s  P1/2t: 105.6 msec              E/A Ratio: 0.61                                 Peak Gradient: 3.7 mmHg  Area (PHT): 2.08 cm^2          Deceleration Time: 349.6 msec  MR Velocity: 4.66 m/s  Aortic Valve  Peak Velocity: 1.39 m/s                Mean Velocity: 0.79 m/s  Peak Gradient: 7.73 mmHg               Mean Gradient: 3.01 mmHg  Area (continuity): 1.27 cm^2  AV VTI: 28.28 cm  Cusp Separation: 1.47 cm  Tricuspid Valve  Estimated RVSP: 31 mmHg              Estimated RAP: 10 mmHg  TR Velocity: 2.29 m/s                TR Gradient: 21.01 mmHg  Pulmonic Valve  Peak Velocity: 0.68 m/s           Peak Gradient: 1.86 mmHg                                    Estimated PASP: 31.01 mmHg  LVOT  Peak Velocity: 0.72 m/s              Mean Velocity: 0.41 m/s  Peak Gradient: 2 mmHg                Mean Gradient: 0.9 mmHg  LVOT Diameter: 1.98 cm               LVOT VTI: 11.64 cm Structures  Left Atrium  LA Dimension: 3.04 cm                 LA Area: 18.08 cm^2  LA/Aorta: 1.09  LA Volume/Index: 49.97 ml  Left Ventricle  Diastolic Dimension: 4.89 cm         Systolic Dimension: 7.46 cm  Septum Diastolic: 5.64 cm            Septum Systolic: 3.90 cm  PW Diastolic: 8.86 cm                PW Systolic: 1.70 cm                                       FS: 53.9 %  LV EDV/LV EDV Index: 70.29 ml        LV ESV/LV ESV Index: 10.47 ml  EF Calculated: 85.1 %  LVOT Diameter: 1.98 cm  Right Ventricle  Diastolic Dimension: 1.82 cm                                    RV Systolic Pressure: 92.52 mmHg Aorta/ Miscellaneous Aorta  Aortic Root: 2.8 cm  LVOT Diameter: 1.98 cm    Xr Chest Portable    Result Date: 3/19/2021  EXAMINATION: XR CHEST PORTABLE CLINICAL HISTORY: INTUBATION COMPARISONS: OCTOBER 9, 2020 FINDINGS: Endotracheal tube has migrated 3 cm inferiorly, just lying cephalad to mojgan. Nasogastric tube courses beneath diaphragm. Osseous structures intact. Cardiopericardial silhouette normal. Aorta calcified. Ill-defined area increased opacity right  lung base. RIGHT LOWER LUNG SUBSEGMENTAL ATELECTASIS/PNEUMONIA. INTERVAL INFERIOR MIGRATION ENDOTRACHEAL TUBE 3 CM WITH TIP NOW JUST PROXIMAL TO MOJGAN. MAY WISH TO RETRACT ENDOTRACHEAL TUBE 2 CM. Xr Chest Portable    Result Date: 3/19/2021  EXAMINATION: XR CHEST PORTABLE CLINICAL HISTORY: RESPIRATORY FAILURE COMPARISONS: MARCH 16, 2021 FINDINGS: Endotracheal tube tip 2.3 cm superior to mojgan. Nasogastric tube courses beneath diaphragm area osseous structures intact. Ill-defined areas increase opacity at lung bases. Cardiopericardial silhouette normal.     BIBASILAR SUBSEGMENTAL ATELECTASIS/PNEUMONIA IN THIS PARTIALLY EXPIRATORY CHEST RADIOGRAPH.       Recent Labs     03/21/21  0528 03/22/21  0530 03/23/21  0757   WBC 10.6 9.6 12.7*   HGB 8.6* 8.7* 9.5*    236 291     Recent Labs     03/21/21  0528 03/22/21  0527 03/23/21  0648    145* 142   K 4.0 3.9 4.4   * 111* 112*   CO2 23 25 20   BUN 37* 31* 36*

## 2021-03-23 NOTE — PROGRESS NOTES
Pulmonary & Critical Care Medicine ICU Progress Note  Chief complaint : Post cardiac arrest, acute respiratory failure    Subjunctive/24 hour events :   Patient seen and examined during multidisciplinary rounds with RN, charge nurse, RT, pharmacy, dietitian, and social service.        Sedated on vent, was extubated yesterday had upper airway stridor and went into acute respiratory distress was reintubated electively, on vent since yesterday, currently sedated and does not follow commands, no fever overnight, she is on 35% FiO2 saturation 100%, blood pressure is okay, urine output 800 cc,+bowel movement, blood sugar is not controlled    Social History     Tobacco Use    Smoking status: Former Smoker     Packs/day: 1.00     Years: 59.00     Pack years: 59.00     Types: Cigarettes     Start date: 2017    Smokeless tobacco: Never Used    Tobacco comment: quit 2-3 weeks ago    Substance Use Topics    Alcohol use: Not Currently         Problem Relation Age of Onset    Heart Disease Father         cardiac bypass    Arthritis Father     Arthritis Mother     Other Mother          at age 80    Other Sister         [facility]    No Known Problems Daughter     Stroke Son        Recent Labs     21  1044   PHART 7.374   [id number] 40   PO2ART 80*       MV Settings:  Vent Mode: AC/VC+ Rate Set: 16 bmp/Vt Ordered: 350 mL/ /FiO2 : 35 %           IV:   dexmedetomidine 0.4 mcg/kg/hr (21 0230)    dextrose      propofol 20 mcg/kg/min (21 0748)       Vitals:  BP (!) 155/56   Pulse 70   Temp 97.8 °F (36.6 °C) (Bladder)   Resp 16   Ht 4' 11\" (1.499 m)   Wt 147 lb 4.3 oz (66.8 kg)   LMP  (LMP Unknown)   SpO2 100%   BMI 29.74 kg/m²    Tmax:        Intake/Output Summary (Last 24 hours) at 3/23/2021 0814  Last data filed at 3/23/2021 8591  Gross per 24 hour   Intake 1612.81 ml   Output 825 ml   Net 787.81 ml       EXAM:  General: Sedated, on vent, no distress  Head: normocephalic, atraumatic  Eyes:No gross abnormalities. ENT:  MMM no lesions, ET and OG tube in  Neck:  supple and no masses  Chest : Good air movement, minimal rales at the base, no wheezing, nontender, tympanic  Heart[de-identified] Heart sounds are normal.  Regular rate and rhythm without murmur, gallop or rub. ABD:  symmetric, soft, non-tender, no guarding or rebound  Musculoskeletal : no cyanosis, no clubbing and no edema  Neuro: Currently sedated, yesterday she moves all 4 extremities after extubation  Skin: No rashes or nodules noted.   Lymph node:  no cervical nodes  Urology: Yes Sanchez   Psychiatric: Sedated and calm    Medications:  Scheduled Meds:   metoprolol  5 mg Intravenous Q6H    nitroglycerin  0.5 inch Topical 4 times per day    heparin (porcine)  5,000 Units Subcutaneous 3 times per day    methylPREDNISolone  40 mg Intravenous Q12H    insulin glargine  5 Units Subcutaneous Nightly    docusate  100 mg Oral BID    polyethylene glycol  17 g Oral Daily    budesonide  0.5 mg Nebulization BID    pantoprazole  40 mg Intravenous BID    And    sodium chloride (PF)  10 mL Intravenous BID    insulin lispro  0-18 Units Subcutaneous Q6H    pantoprazole (PROTONIX) bolus  80 mg Intravenous Once    chlorhexidine  15 mL Mouth/Throat BID    sodium chloride flush  10 mL Intravenous 2 times per day    ipratropium-albuterol  1 ampule Inhalation 4x daily       PRN Meds:  morphine, hydrALAZINE **OR** hydrALAZINE, glucose, dextrose, glucagon (rDNA), dextrose, labetalol, labetalol, promethazine **OR** ondansetron, sodium chloride flush, acetaminophen **OR** acetaminophen    Results: reviewed by me   CBC:   Recent Labs     03/21/21  0528 03/22/21  0530 03/23/21  0757   WBC 10.6 9.6 12.7*   HGB 8.6* 8.7* 9.5*   HCT 27.1* 27.2* 30.2*   MCV 93.1 95.2 92.6    236 291     BMP:   Recent Labs     03/21/21  0528 03/22/21  0527 03/23/21  0648    145* 142   K 4.0 3.9 4.4   * 111* 112*   CO2 23 25 20   PHOS 4.7 3.1 3.3   BUN 37* 31* 36*   CREATININE 2.04* 1.88* 1.81*     LIVER PROFILE:   Recent Labs     03/21/21  0528 03/22/21  0527 03/23/21  0648   AST 90* 46* 41*   * 110* 87*   BILITOT <0.2 <0.2 <0.2   ALKPHOS 93 97 109     PT/INR:   Recent Labs     03/21/21  0528 03/22/21  0528 03/23/21  0649   PROTIME 16.5* 16.2* 15.3*   INR 1.3 1.3 1.2     APTT:   Recent Labs     03/21/21  0528 03/22/21  0528 03/23/21  0649   APTT 42.1* 38.0* 27.8     UA:No results for input(s): NITRITE, COLORU, PHUR, LABCAST, WBCUA, RBCUA, MUCUS, TRICHOMONAS, YEAST, BACTERIA, CLARITYU, SPECGRAV, LEUKOCYTESUR, UROBILINOGEN, BILIRUBINUR, BLOODU, GLUCOSEU, AMORPHOUS in the last 72 hours. Invalid input(s): Apoorva Dirk    Cultures:  Negative so far  Films:  CXR reviewed by me and it showed left lower lobe atelectasis/effusion, slightly congested. Assessment:   This is a critically ill patient at risk of deterioration / death , needing close ICU monitoring and intervention due to below noted problems   · Acute hypoxic respiratory failure due to cardiac arrest  · Cardiac arrest, possibly induced by hyperkalemia  · Acute on chronic kidney injury, improving  · Shock physiology resolved  · Diabetes  · Constipation  · High tube feed residuals, likely gastroparesis    Recommendations  · Vent support lung protective strategy  · head of the bed 30°  · Breathing trial today and sedation holiday  · Watch for ICU delirium: TV on, natural light, avoid benzos, pain control, early mobility, and family engagement  · PUD prophylaxis  · DVT prophylaxis  · Continue cardioprotective meds  · Continue Lantus increased to 10 units daily  · Monitor blood sugar target 140180  · Monitor urine output  · Lasix 20 mg IV x1  · Continue Solu-Medrol, will change to daily due to hyperglycemia  · Decrease water flushes 200 cc every 6 hours  · Reglan 10 mg every 8 hours x3 doses    Discussed with patient     Due to the immediate potential for life-threatening deterioration due to acute respiratory failure I spent 34 minutes providing critical care.  This time is excluding time spent performing procedures.           Electronically signed by Antionette Ahn MD,  [facility] ,on 3/23/2021 at 8:14 AM

## 2021-03-24 LAB
ALBUMIN SERPL-MCNC: 2.9 G/DL (ref 3.5–4.6)
ALP BLD-CCNC: 120 U/L (ref 40–130)
ALT SERPL-CCNC: 68 U/L (ref 0–33)
ANION GAP SERPL CALCULATED.3IONS-SCNC: 11 MEQ/L (ref 9–15)
ANISOCYTOSIS: ABNORMAL
APTT: 38.2 SEC (ref 24.4–36.8)
AST SERPL-CCNC: 28 U/L (ref 0–35)
BASE EXCESS ARTERIAL: 0 (ref -3–3)
BASOPHILS ABSOLUTE: 0 K/UL (ref 0–0.2)
BASOPHILS RELATIVE PERCENT: 0.3 %
BILIRUB SERPL-MCNC: <0.2 MG/DL (ref 0.2–0.7)
BUN BLDV-MCNC: 44 MG/DL (ref 8–23)
BURR CELLS: ABNORMAL
CALCIUM IONIZED, CALC AT PH 7.4: 1.25 MMOL/L (ref 1.11–1.3)
CALCIUM IONIZED: 1.27 MMOL/L (ref 1.11–1.3)
CALCIUM IONIZED: 1.33 MMOL/L (ref 1.12–1.32)
CALCIUM SERPL-MCNC: 9.5 MG/DL (ref 8.5–9.9)
CHLORIDE BLD-SCNC: 107 MEQ/L (ref 95–107)
CO2: 21 MEQ/L (ref 20–31)
CREAT SERPL-MCNC: 1.98 MG/DL (ref 0.5–0.9)
EOSINOPHILS ABSOLUTE: 0 K/UL (ref 0–0.7)
EOSINOPHILS RELATIVE PERCENT: 0 %
GFR AFRICAN AMERICAN: 28
GFR AFRICAN AMERICAN: 29.6
GFR NON-AFRICAN AMERICAN: 23
GFR NON-AFRICAN AMERICAN: 24.5
GLOBULIN: 3.7 G/DL (ref 2.3–3.5)
GLUCOSE BLD-MCNC: 214 MG/DL (ref 70–99)
GLUCOSE BLD-MCNC: 218 MG/DL (ref 60–115)
GLUCOSE BLD-MCNC: 219 MG/DL (ref 60–115)
GLUCOSE BLD-MCNC: 310 MG/DL (ref 60–115)
GLUCOSE BLD-MCNC: 358 MG/DL (ref 60–115)
HCO3 ARTERIAL: 25.1 MMOL/L (ref 21–29)
HCT VFR BLD CALC: 28.5 % (ref 37–47)
HEMOGLOBIN: 8.9 G/DL (ref 12–16)
HEMOGLOBIN: 9 GM/DL (ref 12–16)
INR BLD: 1.1
LACTATE: 0.66 MMOL/L (ref 0.4–2)
LYMPHOCYTES ABSOLUTE: 1 K/UL (ref 1–4.8)
LYMPHOCYTES RELATIVE PERCENT: 5 %
MAGNESIUM: 3 MG/DL (ref 1.7–2.4)
MCH RBC QN AUTO: 29.2 PG (ref 27–31.3)
MCHC RBC AUTO-ENTMCNC: 31.3 % (ref 33–37)
MCV RBC AUTO: 93.4 FL (ref 82–100)
MICROCYTES: ABNORMAL
MONOCYTES ABSOLUTE: 2 K/UL (ref 0.2–0.8)
MONOCYTES RELATIVE PERCENT: 9.5 %
NEUTROPHILS ABSOLUTE: 17.1 K/UL (ref 1.4–6.5)
NEUTROPHILS RELATIVE PERCENT: 86 %
O2 SAT, ARTERIAL: 97 % (ref 93–100)
PCO2 ARTERIAL: 44 MM HG (ref 35–45)
PDW BLD-RTO: 16.7 % (ref 11.5–14.5)
PERFORMED ON: ABNORMAL
PH ARTERIAL: 7.36 (ref 7.35–7.45)
PHOSPHORUS: 2.6 MG/DL (ref 2.3–4.8)
PLATELET # BLD: 329 K/UL (ref 130–400)
PLATELET SLIDE REVIEW: NORMAL
PO2 ARTERIAL: 99 MM HG (ref 75–108)
POC CHLORIDE: 109 MEQ/L (ref 99–110)
POC CREATININE: 2.1 MG/DL (ref 0.6–1.2)
POC FIO2: 35
POC HEMATOCRIT: 26 % (ref 36–48)
POC POTASSIUM: 4 MEQ/L (ref 3.5–5.1)
POC SAMPLE TYPE: ABNORMAL
POC SODIUM: 143 MEQ/L (ref 136–145)
POIKILOCYTES: ABNORMAL
POLYCHROMASIA: ABNORMAL
POTASSIUM SERPL-SCNC: 4.2 MEQ/L (ref 3.4–4.9)
PROCALCITONIN: 0.42 NG/ML (ref 0–0.15)
PROTHROMBIN TIME: 14.6 SEC (ref 12.3–14.9)
RBC # BLD: 3.06 M/UL (ref 4.2–5.4)
SODIUM BLD-SCNC: 139 MEQ/L (ref 135–144)
TCO2 ARTERIAL: 26 (ref 22–29)
TOTAL PROTEIN: 6.6 G/DL (ref 6.3–8)
WBC # BLD: 19.9 K/UL (ref 4.8–10.8)

## 2021-03-24 PROCEDURE — 94003 VENT MGMT INPAT SUBQ DAY: CPT

## 2021-03-24 PROCEDURE — 6360000002 HC RX W HCPCS: Performed by: INTERNAL MEDICINE

## 2021-03-24 PROCEDURE — 85025 COMPLETE CBC W/AUTO DIFF WBC: CPT

## 2021-03-24 PROCEDURE — 99233 SBSQ HOSP IP/OBS HIGH 50: CPT | Performed by: PSYCHIATRY & NEUROLOGY

## 2021-03-24 PROCEDURE — 84132 ASSAY OF SERUM POTASSIUM: CPT

## 2021-03-24 PROCEDURE — 94799 UNLISTED PULMONARY SVC/PX: CPT

## 2021-03-24 PROCEDURE — 6370000000 HC RX 637 (ALT 250 FOR IP): Performed by: INTERNAL MEDICINE

## 2021-03-24 PROCEDURE — 2580000003 HC RX 258: Performed by: INTERNAL MEDICINE

## 2021-03-24 PROCEDURE — 36600 WITHDRAWAL OF ARTERIAL BLOOD: CPT

## 2021-03-24 PROCEDURE — 99232 SBSQ HOSP IP/OBS MODERATE 35: CPT | Performed by: INTERNAL MEDICINE

## 2021-03-24 PROCEDURE — 84295 ASSAY OF SERUM SODIUM: CPT

## 2021-03-24 PROCEDURE — 85730 THROMBOPLASTIN TIME PARTIAL: CPT

## 2021-03-24 PROCEDURE — 83605 ASSAY OF LACTIC ACID: CPT

## 2021-03-24 PROCEDURE — 83735 ASSAY OF MAGNESIUM: CPT

## 2021-03-24 PROCEDURE — 84100 ASSAY OF PHOSPHORUS: CPT

## 2021-03-24 PROCEDURE — 99291 CRITICAL CARE FIRST HOUR: CPT | Performed by: INTERNAL MEDICINE

## 2021-03-24 PROCEDURE — 82803 BLOOD GASES ANY COMBINATION: CPT

## 2021-03-24 PROCEDURE — 80053 COMPREHEN METABOLIC PANEL: CPT

## 2021-03-24 PROCEDURE — 2500000003 HC RX 250 WO HCPCS: Performed by: INTERNAL MEDICINE

## 2021-03-24 PROCEDURE — 82330 ASSAY OF CALCIUM: CPT

## 2021-03-24 PROCEDURE — 82565 ASSAY OF CREATININE: CPT

## 2021-03-24 PROCEDURE — 2000000000 HC ICU R&B

## 2021-03-24 PROCEDURE — 87205 SMEAR GRAM STAIN: CPT

## 2021-03-24 PROCEDURE — 94640 AIRWAY INHALATION TREATMENT: CPT

## 2021-03-24 PROCEDURE — 94761 N-INVAS EAR/PLS OXIMETRY MLT: CPT

## 2021-03-24 PROCEDURE — 82948 REAGENT STRIP/BLOOD GLUCOSE: CPT

## 2021-03-24 PROCEDURE — 85610 PROTHROMBIN TIME: CPT

## 2021-03-24 PROCEDURE — C9113 INJ PANTOPRAZOLE SODIUM, VIA: HCPCS | Performed by: INTERNAL MEDICINE

## 2021-03-24 PROCEDURE — 82435 ASSAY OF BLOOD CHLORIDE: CPT

## 2021-03-24 PROCEDURE — 87070 CULTURE OTHR SPECIMN AEROBIC: CPT

## 2021-03-24 PROCEDURE — 85014 HEMATOCRIT: CPT

## 2021-03-24 PROCEDURE — 84145 PROCALCITONIN (PCT): CPT

## 2021-03-24 PROCEDURE — 2700000000 HC OXYGEN THERAPY PER DAY

## 2021-03-24 PROCEDURE — 36415 COLL VENOUS BLD VENIPUNCTURE: CPT

## 2021-03-24 RX ORDER — INSULIN GLARGINE 100 [IU]/ML
20 INJECTION, SOLUTION SUBCUTANEOUS NIGHTLY
Status: DISCONTINUED | OUTPATIENT
Start: 2021-03-24 | End: 2021-03-25

## 2021-03-24 RX ORDER — FUROSEMIDE 10 MG/ML
20 INJECTION INTRAMUSCULAR; INTRAVENOUS ONCE
Status: COMPLETED | OUTPATIENT
Start: 2021-03-24 | End: 2021-03-24

## 2021-03-24 RX ADMIN — IPRATROPIUM BROMIDE AND ALBUTEROL SULFATE 1 AMPULE: .5; 3 SOLUTION RESPIRATORY (INHALATION) at 04:03

## 2021-03-24 RX ADMIN — IPRATROPIUM BROMIDE AND ALBUTEROL SULFATE 1 AMPULE: .5; 3 SOLUTION RESPIRATORY (INHALATION) at 10:12

## 2021-03-24 RX ADMIN — MEROPENEM 1000 MG: 1 INJECTION, POWDER, FOR SOLUTION INTRAVENOUS at 07:59

## 2021-03-24 RX ADMIN — POLYETHYLENE GLYCOL 3350 17 G: 17 POWDER, FOR SOLUTION ORAL at 07:59

## 2021-03-24 RX ADMIN — MEROPENEM 500 MG: 500 INJECTION, POWDER, FOR SOLUTION INTRAVENOUS at 21:30

## 2021-03-24 RX ADMIN — NITROGLYCERIN 0.5 INCH: 20 OINTMENT TOPICAL at 12:27

## 2021-03-24 RX ADMIN — METOPROLOL TARTRATE 5 MG: 1 INJECTION, SOLUTION INTRAVENOUS at 04:54

## 2021-03-24 RX ADMIN — Medication 10 ML: at 07:59

## 2021-03-24 RX ADMIN — Medication 10 ML: at 20:12

## 2021-03-24 RX ADMIN — NITROGLYCERIN 0.5 INCH: 20 OINTMENT TOPICAL at 18:04

## 2021-03-24 RX ADMIN — METHYLPREDNISOLONE SODIUM SUCCINATE 40 MG: 40 INJECTION, POWDER, FOR SOLUTION INTRAMUSCULAR; INTRAVENOUS at 07:59

## 2021-03-24 RX ADMIN — METOPROLOL TARTRATE 5 MG: 1 INJECTION, SOLUTION INTRAVENOUS at 21:30

## 2021-03-24 RX ADMIN — HEPARIN SODIUM 5000 UNITS: 5000 INJECTION INTRAVENOUS; SUBCUTANEOUS at 21:30

## 2021-03-24 RX ADMIN — METOPROLOL TARTRATE 5 MG: 1 INJECTION, SOLUTION INTRAVENOUS at 15:03

## 2021-03-24 RX ADMIN — INSULIN GLARGINE 20 UNITS: 100 INJECTION, SOLUTION SUBCUTANEOUS at 20:03

## 2021-03-24 RX ADMIN — HEPARIN SODIUM 5000 UNITS: 5000 INJECTION INTRAVENOUS; SUBCUTANEOUS at 05:32

## 2021-03-24 RX ADMIN — DOCUSATE SODIUM 100 MG: 50 LIQUID ORAL at 07:59

## 2021-03-24 RX ADMIN — PANTOPRAZOLE SODIUM 40 MG: 40 INJECTION, POWDER, FOR SOLUTION INTRAVENOUS at 07:59

## 2021-03-24 RX ADMIN — BUDESONIDE 500 MCG: 0.5 SUSPENSION RESPIRATORY (INHALATION) at 04:03

## 2021-03-24 RX ADMIN — IPRATROPIUM BROMIDE AND ALBUTEROL SULFATE 1 AMPULE: .5; 3 SOLUTION RESPIRATORY (INHALATION) at 19:46

## 2021-03-24 RX ADMIN — PROPOFOL 35 MCG/KG/MIN: 10 INJECTION, EMULSION INTRAVENOUS at 23:47

## 2021-03-24 RX ADMIN — FUROSEMIDE 20 MG: 10 INJECTION, SOLUTION INTRAMUSCULAR; INTRAVENOUS at 09:25

## 2021-03-24 RX ADMIN — HEPARIN SODIUM 5000 UNITS: 5000 INJECTION INTRAVENOUS; SUBCUTANEOUS at 15:03

## 2021-03-24 RX ADMIN — NITROGLYCERIN 0.5 INCH: 20 OINTMENT TOPICAL at 23:52

## 2021-03-24 RX ADMIN — CHLORHEXIDINE GLUCONATE 15 ML: 1.2 RINSE ORAL at 07:59

## 2021-03-24 RX ADMIN — IPRATROPIUM BROMIDE AND ALBUTEROL SULFATE 1 AMPULE: .5; 3 SOLUTION RESPIRATORY (INHALATION) at 14:12

## 2021-03-24 RX ADMIN — METOCLOPRAMIDE HYDROCHLORIDE 10 MG: 5 INJECTION INTRAMUSCULAR; INTRAVENOUS at 02:05

## 2021-03-24 RX ADMIN — CHLORHEXIDINE GLUCONATE 15 ML: 1.2 RINSE ORAL at 20:02

## 2021-03-24 RX ADMIN — HYDRALAZINE HYDROCHLORIDE 10 MG: 20 INJECTION INTRAMUSCULAR; INTRAVENOUS at 11:50

## 2021-03-24 RX ADMIN — ACETAMINOPHEN 650 MG: 325 TABLET ORAL at 02:05

## 2021-03-24 RX ADMIN — PROPOFOL 30 MCG/KG/MIN: 10 INJECTION, EMULSION INTRAVENOUS at 05:40

## 2021-03-24 RX ADMIN — LABETALOL HYDROCHLORIDE 20 MG: 5 INJECTION, SOLUTION INTRAVENOUS at 12:27

## 2021-03-24 RX ADMIN — SODIUM CHLORIDE, PRESERVATIVE FREE 10 ML: 5 INJECTION INTRAVENOUS at 07:59

## 2021-03-24 RX ADMIN — PROPOFOL 35 MCG/KG/MIN: 10 INJECTION, EMULSION INTRAVENOUS at 18:03

## 2021-03-24 RX ADMIN — BUDESONIDE 500 MCG: 0.5 SUSPENSION RESPIRATORY (INHALATION) at 14:12

## 2021-03-24 RX ADMIN — METOPROLOL TARTRATE 5 MG: 1 INJECTION, SOLUTION INTRAVENOUS at 09:25

## 2021-03-24 RX ADMIN — DOCUSATE SODIUM 100 MG: 50 LIQUID ORAL at 20:02

## 2021-03-24 RX ADMIN — NITROGLYCERIN 0.5 INCH: 20 OINTMENT TOPICAL at 05:40

## 2021-03-24 ASSESSMENT — PULMONARY FUNCTION TESTS
PIF_VALUE: 21
PIF_VALUE: 20
PIF_VALUE: 20
PIF_VALUE: 22
PIF_VALUE: 20
PIF_VALUE: 20
PIF_VALUE: 24
PIF_VALUE: 27
PIF_VALUE: 15
PIF_VALUE: 20
PIF_VALUE: 21
PIF_VALUE: 23
PIF_VALUE: 18
PIF_VALUE: 21
PIF_VALUE: 12
PIF_VALUE: 21
PIF_VALUE: 22
PIF_VALUE: 14
PIF_VALUE: 11
PIF_VALUE: 19
PIF_VALUE: 15
PIF_VALUE: 22
PIF_VALUE: 20
PIF_VALUE: 21
PIF_VALUE: 22
PIF_VALUE: 21
PIF_VALUE: 14
PIF_VALUE: 21

## 2021-03-24 ASSESSMENT — PAIN SCALES - GENERAL
PAINLEVEL_OUTOF10: 0

## 2021-03-24 NOTE — PROGRESS NOTES
Department of Internal Medicine  General Internal Medicine  Attending Progress Note      SUBJECTIVE:  Pt seen and examined. Intubated and sedated. Off pressors.  Minimal vent settings    OBJECTIVE      Medications    Current Facility-Administered Medications: insulin glargine (LANTUS) injection vial 20 Units, 20 Units, Subcutaneous, Nightly  meropenem (MERREM) 500 mg IVPB (mini-bag), 500 mg, Intravenous, Q12H  methylPREDNISolone sodium (SOLU-MEDROL) injection 40 mg, 40 mg, Intravenous, Daily  pantoprazole (PROTONIX) injection 40 mg, 40 mg, Intravenous, Daily **AND** sodium chloride (PF) 0.9 % injection 10 mL, 10 mL, Intravenous, Daily  metoprolol (LOPRESSOR) injection 5 mg, 5 mg, Intravenous, Q6H  nitroglycerin (NITRO-BID) 2 % ointment 0.5 inch, 0.5 inch, Topical, 4 times per day  heparin (porcine) injection 5,000 Units, 5,000 Units, Subcutaneous, 3 times per day  dexmedetomidine (PRECEDEX) 400 mcg in sodium chloride 0.9 % 100 mL infusion, 0.2-1.4 mcg/kg/hr, Intravenous, Continuous  docusate (COLACE) 50 MG/5ML liquid 100 mg, 100 mg, Oral, BID  polyethylene glycol (GLYCOLAX) packet 17 g, 17 g, Oral, Daily  budesonide (PULMICORT) nebulizer suspension 500 mcg, 0.5 mg, Nebulization, BID  insulin lispro (HUMALOG) injection vial 0-18 Units, 0-18 Units, Subcutaneous, Q6H  hydrALAZINE (APRESOLINE) injection 10 mg, 10 mg, Intravenous, Q4H PRN **OR** hydrALAZINE (APRESOLINE) injection 20 mg, 20 mg, Intravenous, Q4H PRN  glucose (GLUTOSE) 40 % oral gel 15 g, 15 g, Oral, PRN  dextrose 50 % IV solution, 12.5 g, Intravenous, PRN  glucagon (rDNA) injection 1 mg, 1 mg, Intramuscular, PRN  dextrose 5 % solution, 100 mL/hr, Intravenous, PRN  labetalol (NORMODYNE;TRANDATE) injection 20 mg, 20 mg, Intravenous, Q4H PRN  labetalol (NORMODYNE;TRANDATE) injection 40 mg, 40 mg, Intravenous, Q4H PRN  propofol injection, 5-50 mcg/kg/min, Intravenous, Titrated  promethazine (PHENERGAN) tablet 12.5 mg, 12.5 mg, Oral, Q6H PRN **OR** ondansetron (ZOFRAN) injection 4 mg, 4 mg, Intravenous, Q6H PRN  chlorhexidine (PERIDEX) 0.12 % solution 15 mL, 15 mL, Mouth/Throat, BID  sodium chloride flush 0.9 % injection 10 mL, 10 mL, Intravenous, 2 times per day  sodium chloride flush 0.9 % injection 10 mL, 10 mL, Intravenous, PRN  acetaminophen (TYLENOL) tablet 650 mg, 650 mg, Oral, Q6H PRN **OR** acetaminophen (TYLENOL) suppository 650 mg, 650 mg, Rectal, Q6H PRN  ipratropium-albuterol (DUONEB) nebulizer solution 1 ampule, 1 ampule, Inhalation, 4x daily  Physical    VITALS:  BP (!) 165/52   Pulse 93   Temp 99.9 °F (37.7 °C) (Bladder)   Resp 20   Ht 4' 11\" (1.499 m)   Wt 153 lb 14.1 oz (69.8 kg)   LMP  (LMP Unknown)   SpO2 100%   BMI 31.08 kg/m²   Constitutional: intubated and sedated  Head: Normocephalic, atraumatic  ENT: moist mucous membranes, ETT in place  Neck: neck supple, trachea midline  Lungs: Good inspiratory effort, bilateral rhonchi and crackles  Heart: RRR, normal S1 and S2  GI: Soft, non-distended, +BS  MSK: no edema noted  Skin: warm, dry     Data    CBC:   Lab Results   Component Value Date    WBC 19.9 03/24/2021    RBC 3.06 03/24/2021    HGB 9.0 03/24/2021    HCT 28.5 03/24/2021    MCV 93.4 03/24/2021    MCH 29.2 03/24/2021    MCHC 31.3 03/24/2021    RDW 16.7 03/24/2021     03/24/2021    MPV 8.8 08/01/2015     CMP:    Lab Results   Component Value Date     03/24/2021    K 4.2 03/24/2021    K 4.4 10/19/2020     03/24/2021    CO2 21 03/24/2021    BUN 44 03/24/2021    CREATININE 2.1 03/24/2021    CREATININE 1.98 03/24/2021    GFRAA 28 03/24/2021    LABGLOM 23 03/24/2021    GLUCOSE 214 03/24/2021    PROT 6.6 03/24/2021    LABALBU 2.9 03/24/2021    CALCIUM 9.5 03/24/2021    BILITOT <0.2 03/24/2021    ALKPHOS 120 03/24/2021    AST 28 03/24/2021    ALT 68 03/24/2021       ASSESSMENT AND PLAN      # Cardiac arrest s/p ROSC   - initially required Dopamine, norepinephrine and epinephrine infusion  - off pressors and hypothermia protocol  - TTE showed LVEF of 50%  - cardiology following     # Acute hypoxic respiratory failure  - requiring intubation and mechanical ventilation  - extubated 3/22, but had to be re-intubated same day due to stridor and respiratory distress. - management per critical care service     # Acute encephalopathy  - was following commands and awake and alert during SBT   - likely hypoxic-ischemic etiology from cardiac arrest  - CT head was negative per report  - neurology following     # Acute / chronic anemia   - with Hb of 5.8 on arrival, FOBT was positive  - improved s/p PRBCs given in ED  - on Protonix   - follow H/H      # Lactic acidosis  - in the setting of cardiac arrest  - improved with volume repletion     # Hyperkalemia  - resolved     # SAÚL/CKD3  - nephrology following     # IDDM2 with hyperglycemia  - improved, off insulin drip, on ISS     # Leukocytosis   - improved     Code status - DNRCCA    Disposition: Remains intubated and sedated. Will continue steroids, abx. Pulm consulted.        Mike Lowe,   Internal Medicine

## 2021-03-24 NOTE — PROGRESS NOTES
Physical Therapy Missed Treatment   Facility/Department: [facility] MED SURG IC05/IC05-01    NAME: Sandy Latif    : 1945 (87 y.o.)  MRN: [id number]    Account: [de-identified]  Gender: female      Referral received. Chart reviewed. Discussed case with pts nurse. Pt currently intubated and sedated. She is unable to participate in therapy. Will follow and attempt PT evaluation again at earliest availability.        Sabrina Juarez, PT, 21 at 2:49 PM

## 2021-03-24 NOTE — PROGRESS NOTES
Progress Note  Patient: Jayda Metz  Unit/Bed: IC05/IC05-01  YOB: 1945  MRN: [id number]  Acct: [de-identified]   Admitting Diagnosis: Cardiac arrest [facility]) [I46.9]  Admit Date:  3/18/2021  Hospital Day: 6    Chief Complaint: CPA    Histories:  Past Medical History:   Diagnosis Date    Anxiety     Asthma     dx 2019 / has smoked since age 14    CHF (congestive heart failure) (HCC)     Chronic back pain     Bilateral L5 S1 Radic on emg--surprisingly worse on the left than the right--pt's symptoms and her MRI show worse on the right    Chronic obstructive pulmonary disease with acute exacerbation ([facility] 10/12/2019    Depression     Fibromyalgia     Hyperlipidemia     meds > 8 yrs    Hypertension     meds > 45 yrs    On home O2     2l per n/c at bedtime mostly,     Osteoarthritis     Type II diabetes mellitus, uncontrolled ([facility]     hx > 8 yrs    Unspecified sleep apnea      Past Surgical History:   Procedure Laterality Date    BACK SURGERY  2017    lumbar disc    CARDIAC CATHETERIZATION  11/3/14     DR. MIRELES / no stents    COLONOSCOPY  08/29/2016    w/polypectomy     COLONOSCOPY N/A 9/29/2020    COLONOSCOPY WITH POLYPECTOMY performed by [facility] Munoz MD at [facility] N/A 10/7/2019    EUA HYSTEROSCOPY DILATATION AND CURETTAGE performed by Melodie Black DO at [facility]. [address], COLON, DIAGNOSTIC      EYE SURGERY      [place] with IOL OU / [id number]    umbilical hernia repair    HI ESOPHAGOGASTRODUODENOSCOPY TRANSORAL DIAGNOSTIC N/A 3/24/2017    EGD ESOPHAGOGASTRODUODENOSCOPY performed by Angelique Cowart MD at [place] [id number] 2/8/2018    negative findings    HI REVISE MEDIAN N/CARPAL TUNNEL SURG Left 6/5/2017    LEFT  CARPAL TUNNEL RELEASE performed by Lauren Goodrich MD at [place] [id number]+T/N,FSQTK N/A 2/8/2018    TONSILLECTOMY      as child  UPPER GASTROINTESTINAL ENDOSCOPY  2016    w/bx     UPPER GASTROINTESTINAL ENDOSCOPY N/A 2020    EGD possible biopsy performed by Kai Varela MD at [place] [address] N/A 2020    EGD PUSH ENTEROSCOPY performed [facility] Jesus Streeter MD at [facility]     Family History   Problem Relation Age of Onset    Heart Disease Father         cardiac bypass    Arthritis Father     Arthritis Mother     Other Mother          at age 80    Other Sister         nuknown health hx    No Known Problems Daughter     Stroke Son      Social History     Socioeconomic History    Marital status:      Spouse name: Nica Olea Number of children: 2    Years of education: 15    Highest education level: High school graduate   Occupational History    Occupation: Retired-   Social Needs    Financial resource strain: Not hard at all   Denn[place] insecurity     Worry: Never true     Inability: Never true    Transportation needs     Medical: No     Non-medical: No   Tobacco Use    Smoking status: Former Smoker     Packs/day: 1.00     Years: 59.00     Pack years: 59.00     Types: Cigarettes     Start date: 2017    Smokeless tobacco: Never Used    Tobacco comment: quit 2-3 weeks ago    Substance and Sexual Activity    Alcohol use: Not Currently    Drug use: No    Sexual activity: Yes     Partners: Male   Lifestyle    Physical activity     Days per week: 0 days     Minutes per session: 0 min    Stress: Only a little   Relationships    Social connections     Talks on phone: More than three times a week     Gets together: More than three times a week     Attends Shinto service: 1 to 4 times per year     Active member of club or organization: No     Attends meetings of clubs or organizations: Never     Relationship status: Living with partner   Sonali Edwards Intimate partner violence     Fear of current or ex partner: No     Emotionally abused:  No Physically abused: No     Forced sexual activity: No   Other Topics Concern    None   Social History Narrative    Grew up in [place]     Lives With:  [id number] Spouse    Type of Home: House    Home Layout: Two level, Performs ADL's on one level    Home Access: Stairs to enter with rails    Entrance Stairs - Number of Steps: 4    Bathroom Shower/Tub: Tub/Shower unit, Doors    Bathroom Equipment: Shower chair, Grab bars in [facility]: Rolling walker, Cane, Oxygen(uses her O2 very seldom)    ADL Assistance: Needs assistance    Homemaking Assistance: Needs assistance    Homemaking Responsibilities: No(spouse performs)    Ambulation Assistance: Independent    Transfer Assistance: Independent    Active : No    Occupation: Retired    Type of occupation: worked in [employer]: patient enjoys televsision       Subjective/HPI remains vented 35% FIO2. No Acute events. Minimally responsive    EKG: ST 82        Review of Systems:   Review of Systems  vented    Physical Examination:    BP (!) 155/52   Pulse 92   Temp 99.3 °F (37.4 °C) (Bladder)   Resp 19   Ht 4' 11\" (1.499 m)   Wt 153 lb 14.1 oz (69.8 kg)   LMP  (LMP Unknown)   SpO2 100%   BMI 31.08 kg/m²    Physical Exam   Constitutional: She appears healthy. No distress. HENT:   Normal cephalic and Atraumatic   Eyes: Pupils are equal, round, and reactive to light. Neck: Normal range of motion and thyroid normal. Neck supple. No JVD present. No neck adenopathy. No thyromegaly present. Cardiovascular: Normal rate, regular rhythm, intact distal pulses and normal pulses. Murmur heard. Pulmonary/Chest: Effort normal and breath sounds normal. She has no wheezes. She has no rales. She exhibits no tenderness. Abdominal: Soft. Bowel sounds are normal. There is no abdominal tenderness. Musculoskeletal: Normal range of motion. General: No tenderness or edema.    Neurological: She is alert and oriented to person, place, and time.   Skin: Skin is warm. No cyanosis. Nails show no clubbing.        LABS:  CBC:   Lab Results   Component Value Date    WBC 19.9 03/24/2021    RBC 3.06 03/24/2021    HGB 9.0 03/24/2021    HCT 28.5 03/24/2021    MCV 93.4 03/24/2021    MCH 29.2 03/24/2021    MCHC 31.3 03/24/2021    RDW 16.7 03/24/2021     03/24/2021    MPV 8.8 08/01/2015     CBC with Differential:    Lab Results   Component Value Date    WBC 19.9 03/24/2021    RBC 3.06 03/24/2021    HGB 9.0 03/24/2021    HCT 28.5 03/24/2021     03/24/2021    MCV 93.4 03/24/2021    MCH 29.2 03/24/2021    MCHC 31.3 03/24/2021    RDW 16.7 03/24/2021    NRBC 1 08/28/2020    LYMPHOPCT 5.0 03/24/2021    MONOPCT 9.5 03/24/2021    BASOPCT 0.3 03/24/2021    MONOSABS 2.0 03/24/2021    LYMPHSABS 1.0 03/24/2021    EOSABS 0.0 03/24/2021    BASOSABS 0.0 03/24/2021     CMP:    Lab Results   Component Value Date     03/24/2021    K 4.2 03/24/2021    K 4.4 10/19/2020     03/24/2021    CO2 21 03/24/2021    BUN 44 03/24/2021    CREATININE 2.1 03/24/2021    CREATININE 1.98 03/24/2021    GFRAA 28 03/24/2021    LABGLOM 23 03/24/2021    GLUCOSE 214 03/24/2021    PROT 6.6 03/24/2021    LABALBU 2.9 03/24/2021    CALCIUM 9.5 03/24/2021    BILITOT <0.2 03/24/2021    ALKPHOS 120 03/24/2021    AST 28 03/24/2021    ALT 68 03/24/2021     BMP:    Lab Results   Component Value Date     03/24/2021    K 4.2 03/24/2021    K 4.4 10/19/2020     03/24/2021    CO2 21 03/24/2021    BUN 44 03/24/2021    LABALBU 2.9 03/24/2021    CREATININE 2.1 03/24/2021    CREATININE 1.98 03/24/2021    CALCIUM 9.5 03/24/2021    GFRAA 28 03/24/2021    LABGLOM 23 03/24/2021    GLUCOSE 214 03/24/2021     Magnesium:    Lab Results   Component Value Date    MG 3.0 03/24/2021     Troponin:    Lab Results   Component Value Date    TROPONINI 0.056 03/19/2021        Active Hospital Problems    Diagnosis Date Noted    [facility] [J96.01]      Priority: Low    Cardiac arrest ([facility] [address] [I46.9] 03/18/2021     Priority: Low        Assessment/Plan:  1. CPA   2. Respiratory failure- remains vented. 3. CAD - LAD mild  4. LVEF 55%  5. Echo Severe LVH. 1-2+ MR RVSP 31 mmHg. LVEF 50%  6. History of HCM - need to Keep HR close to 60s. Add iv BB. Will also add Nitrates - post extubation will adjust CV meds.    7. SAÚL - Nephrology following- stable         Electronically signed by Luiz Ward MD on 3/24/2021 at 11:05 AM

## 2021-03-24 NOTE — PROGRESS NOTES
Neurology Follow up    SUBJECTIVE: Patient remains intubated on light sedation. She does awaken with name but does not follow any commands. Sedation was discontinued for a brief period of time and she moves all extremities but did not follow commands according to the nurse  Patient was not able to be extubated and sedation has just been discontinued she is barely following commands.   Case was discussed with     Current Facility-Administered Medications   Medication Dose Route Frequency Provider Last Rate Last Admin    insulin glargine (LANTUS) injection vial 20 Units  20 Units Subcutaneous Nightly Elida Rausch MD        meropenem (MERREM) 500 mg IVPB (mini-bag)  500 mg Intravenous Q12H Brandie Johnston MD        methylPREDNISolone sodium (SOLU-MEDROL) injection 40 mg  40 mg Intravenous Daily Brandie Johnston MD   40 mg at 03/24/21 0759    pantoprazole (PROTONIX) injection 40 mg  40 mg Intravenous Daily Brandie Johnston MD   40 mg at 03/24/21 0759    And    sodium chloride (PF) 0.9 % injection 10 mL  10 mL Intravenous Daily Brandie Johnston MD   10 mL at 03/24/21 0759    metoprolol (LOPRESSOR) injection 5 mg  5 mg Intravenous Q6H Jose Jimenez MD   5 mg at 03/24/21 0925    nitroglycerin (NITRO-BID) 2 % ointment 0.5 inch  0.5 inch Topical 4 times per day Jose Jimenez MD   0.5 inch at 03/24/21 0540    heparin (porcine) injection 5,000 Units  5,000 Units Subcutaneous 3 times per day Brandie Johnston MD   5,000 Units at 03/24/21 0532    dexmedetomidine (PRECEDEX) 400 mcg in sodium chloride 0.9 % 100 mL infusion  0.2-1.4 mcg/kg/hr Intravenous Continuous Brandie Johnston MD   Stopped at 03/23/21 1030    docusate (COLACE) 50 MG/5ML liquid 100 mg  100 mg Oral BID Brandie Johnston MD   100 mg at 03/24/21 0759    polyethylene glycol (GLYCOLAX) packet 17 g  17 g Oral Daily Brandie Johnston MD   17 g at 03/24/21 0759    budesonide (PULMICORT) nebulizer suspension 500 mcg  0.5 mg Nebulization BID 03/24/21 1012       PHYSICAL EXAM:    BP (!) 159/53   Pulse 92   Temp 99.9 °F (37.7 °C) (Bladder)   Resp 20   Ht 4' 11\" (1.499 m)   Wt 153 lb 14.1 oz (69.8 kg)   LMP  (LMP Unknown)   SpO2 100%   BMI 31.08 kg/m²   General Appearance:      Skin:  normal  CVS - Normal sounds, No murmurs , No carotid Bruits  RS -CTA  Abdomen Soft, BS present  Review of Systems   Mental Status Exam:             She is lethargic due to sedation  Funduscopic Exam:     Cranial Nerves            Cranial nerve III           Pupils:  equal, round, reactive to light      Cranial nerves III, IV, VI           Extraocular Movements: intact        Motor: Unable to perform examination but she does withdraw all her extremities to pain            Sensory: Unable to perform        Pinprick             Right Upper Extremity:  normal             Left Upper Extremity:  normal             Right Lower Extremity:  normal             Left Lower Extremity:  normal           Vibration                         Touch            Proprioception                 Coordination:           Finger/Nose   Right:  normal              Left:  normal          Heel-Knee-Shin                Right:  normal              Left:  normal          Rapid Alternating Movements              Right:  normal              Left:  normal          Gait:                       Casual:  normal                         Romberg:  normal            Reflexes:             Deep Tendon Reflexes:             Reflexes are 2 +             Plantar response:                Right:  downgoing               Left:  downgoing    Vascular:  Cardiac Exam:  normal         Echocardiogram Complete 2d With Doppler With Color    Result Date: 3/19/2021  Transthoracic Echocardiography Report (TTE)  Demographics  Patient Name   Lisa Chol  Gender              Female                 M  Patient Number [id number]        Race                Black                                 Ethnicity  Visit Number   [id number] Room Number         IC05  Corporate ID                   Date of Study       03/19/2021  Accession      [id number]      Referring Physician  Number  Date of Birth  1945      Sonographer         Noel Rasmey Roberto FainarositaMICHELLE  Age            67 year(s)      [facility] Cardiology                                 Physician           Stephanie Kuo MD Procedure Type of Study  TTE procedure:ECHO COMPLETE 2D W/DOP W/COLOR. Procedure Date Date: 03/19/2021 Start: 09:15 AM Study Location: Portable Technical Quality: Good visualization Indications:Cardiac arrest. Patient Status: Routine Weight: 143 pounds BP: 89/48 mmHg Allergies   - Other allergy:(Darvon). Conclusions  Summary  Mitral annular calcification is present. 1-2+ MR  Normal tricuspid valve structure and function. 1+ TR  RVSP 31 mmHg  severe CLVH  LVEF 50%  E/A flow reversal noted. Suggestive of diastolic dysfunction. Signature  ----------------------------------------------------------------  Electronically signed by Stephanie Kuo MD(Interpreting  physician) on 03/19/2021 02:13 PM  ----------------------------------------------------------------  Findings Left Ventricle severe CLVH LVEF 50% E/A flow reversal noted. Suggestive of diastolic dysfunction. Right Ventricle Normal right ventricle structure and function. Normal right ventricle systolic pressure. Left Atrium Moderately dilated left atrium. Right Atrium Normal right atrium. Mitral Valve Mitral annular calcification is present. 1-2+ MR Tricuspid Valve Normal tricuspid valve structure and function. 1+ TR RVSP 31 mmHg Aortic Valve Normal aortic valve structure and function. Pulmonic Valve The pulmonic valve was not well visualized . Pericardial Effusion No evidence of pericardial effusion. Pleural Effusion No evidence of pleural effusion. Aorta \ Miscellaneous The aorta is within normal limits.  M-Mode Measurements (cm)  LVIDd: 4.01 cm LVIDs: 1.85 cm  IVSd: 1.45 cm                          IVSs: 1.23 cm  LVPWd: 1.32 cm                         LVPWs: 1.61 cm  Rt. Vent.  Dimension: 1.07 cm           AO Root Dimension: 2.8 cm                                         ACS: 1.47 cm                                         LA: 3.04 cm                                         LVOT: 1.98 cm Doppler Measurements:  AV Velocity:0.01 m/s                   MV Peak E-Wave: 0.96 m/s  AV Peak Gradient: 7.73 mmHg            MV Peak A-Wave: 1.58 m/s  AV Mean Gradient: 3.01 mmHg  AV Area (Continuity):1.27 cm^2         MV P1/2t: 105.6 msec  TR Velocity:2.29 m/s                   MVA by PHT2.08 cm^2  TR Gradient:21.01 mmHg                 Estimated RAP:10 mmHg                                         RVSP:31 mmHg Valves  Mitral Valve  Peak E-Wave: 0.96 m/s          Peak A-Wave: 1.58 m/s  P1/2t: 105.6 msec              E/A Ratio: 0.61                                 Peak Gradient: 3.7 mmHg  Area (PHT): 2.08 cm^2          Deceleration Time: 349.6 msec  MR Velocity: 4.66 m/s  Aortic Valve  Peak Velocity: 1.39 m/s                Mean Velocity: 0.79 m/s  Peak Gradient: 7.73 mmHg               Mean Gradient: 3.01 mmHg  Area (continuity): 1.27 cm^2  AV VTI: 28.28 cm  Cusp Separation: 1.47 cm  Tricuspid Valve  Estimated RVSP: 31 mmHg              Estimated RAP: 10 mmHg  TR Velocity: 2.29 m/s                TR Gradient: 21.01 mmHg  Pulmonic Valve  Peak Velocity: 0.68 m/s           Peak Gradient: 1.86 mmHg                                    Estimated PASP: 31.01 mmHg  LVOT  Peak Velocity: 0.72 m/s              Mean Velocity: 0.41 m/s  Peak Gradient: 2 mmHg                Mean Gradient: 0.9 mmHg  LVOT Diameter: 1.98 cm               LVOT VTI: 11.64 cm Structures  Left Atrium  LA Dimension: 3.04 cm                 LA Area: 18.08 cm^2  LA/Aorta: 1.09  LA Volume/Index: 49.97 ml  Left Ventricle  Diastolic Dimension: 1.00 cm         Systolic Dimension: 4.34 cm  Septum Diastolic: 3.00 cm            Septum Systolic: 8.01 cm  PW Diastolic: 8.24 cm                PW Systolic: 7.82 cm                                       FS: 53.9 %  LV EDV/LV EDV Index: 70.29 ml        LV ESV/LV ESV Index: 10.47 ml  EF Calculated: 85.1 %  LVOT Diameter: 1.98 cm  Right Ventricle  Diastolic Dimension: 6.15 cm                                    RV Systolic Pressure: 88.67 mmHg Aorta/ Miscellaneous Aorta  Aortic Root: 2.8 cm  LVOT Diameter: 1.98 cm    Xr Chest Portable    Result Date: 3/19/2021  EXAMINATION: XR CHEST PORTABLE CLINICAL HISTORY: INTUBATION COMPARISONS: OCTOBER 9, 2020 FINDINGS: Endotracheal tube has migrated 3 cm inferiorly, just lying cephalad to mojgan. Nasogastric tube courses beneath diaphragm. Osseous structures intact. Cardiopericardial silhouette normal. Aorta calcified. Ill-defined area increased opacity right  lung base. RIGHT LOWER LUNG SUBSEGMENTAL ATELECTASIS/PNEUMONIA. INTERVAL INFERIOR MIGRATION ENDOTRACHEAL TUBE 3 CM WITH TIP NOW JUST PROXIMAL TO MOJGAN. MAY WISH TO RETRACT ENDOTRACHEAL TUBE 2 CM. Xr Chest Portable    Result Date: 3/19/2021  EXAMINATION: XR CHEST PORTABLE CLINICAL HISTORY: RESPIRATORY FAILURE COMPARISONS: MARCH 16, 2021 FINDINGS: Endotracheal tube tip 2.3 cm superior to mojgan. Nasogastric tube courses beneath diaphragm area osseous structures intact. Ill-defined areas increase opacity at lung bases. Cardiopericardial silhouette normal.     BIBASILAR SUBSEGMENTAL ATELECTASIS/PNEUMONIA IN THIS PARTIALLY EXPIRATORY CHEST RADIOGRAPH.       Recent Labs     03/22/21  0530 03/23/21  0757 03/23/21  1603 03/24/21  0526 03/24/21  0949   WBC 9.6 12.7*  --  19.9*  --    HGB 8.7* 9.5* 9.2* 8.9* 9.0*    291  --  329  --      Recent Labs     03/22/21  0527 03/23/21  0648 03/23/21  1603 03/24/21  0526 03/24/21  0949   * 142  --  139  --    K 3.9 4.4  --  4.2  --    * 112*  --  107  --    CO2 25 20  --  21  --    BUN 31* 36*  --  44*  -- CREATININE 1.88* 1.81* 2.0* 1.98* 2.1*   GLUCOSE 185* 235*  --  214*  --      Recent Labs     03/22/21  0527 03/23/21  0648 03/24/21  0526   BILITOT <0.2 <0.2 <0.2   ALKPHOS 97 109 120   AST 46* 41* 28   * 87* 68*     Lab Results   Component Value Date    PROTIME 14.6 03/24/2021    INR 1.1 03/24/2021     No results found for: LITHIUM, DILFRTOT, VALPROATE    ASSESSMENT AND PLAN  Encephalopathy secondary to cardiorespiratory arrest.  Patient may have some hypoxemic damage though this very difficult to certain as patient is sedated and intubated. We will keep an eye on this repeat CT scan did not anything significant. We will try and continue to lighten her sedation to reexamine. For now there is a nonfocal examination  Of the patient's brief extubation 2 days ago patient has not been able to extubate. She continues on sedation and sedation has just been discontinued and she is barely chest following command but become very weak. I am not quite sure he can sustain extubation. We will keep an eye on this. There appears to be some suggestion that the family does not want any further intervention in terms of trach and PEG though we will continue to discuss this. She does have some right-sided weakness though it is not very apparent and is very difficult examined at this time without sedation. Critical care time 35 minutes    Mehul Rodriguez MD, [place], American Board of Psychiatry & Neurology  Board Certified in Vascular Neurology  Board Certified in Neuromuscular Medicine  Certified in [place] 38

## 2021-03-24 NOTE — PROGRESS NOTES
Pulmonary & Critical Care Medicine ICU Progress Note  Chief complaint : Post cardiac arrest, acute respiratory failure    Subjunctive/24 hour events :   Patient seen and examined during multidisciplinary rounds with RN, charge nurse, RT, pharmacy, dietitian, and social service.      She is awake on vent, follows command, weak, cough is weak, she tolerated CPAP trial for almost an hour then start getting tachypneic and tired, bowels moving, urine output 800 cc, she still getting fever 101.7, no significant secretions from ET tube, no diarrhea, blood sugar is not controlled she is tolerating tube feed    Social History     Tobacco Use    Smoking status: Former Smoker     Packs/day: 1.00     Years: 59.00     Pack years: 59.00     Types: Cigarettes     Start date: 2017    Smokeless tobacco: Never Used    Tobacco comment: quit 2-3 weeks ago    Substance Use Topics    Alcohol use: Not Currently         Problem Relation Age of Onset    Heart Disease Father         cardiac bypass    Arthritis Father     Arthritis Mother     Other Mother          at age 80    Other Sister         [facility]    No Known Problems Daughter     Stroke Son        Recent Labs     21  1603 21  0949   PHART 7.395 7.364   GCP0SPE 38 40   PO2ART 109* 99*       MV Settings:  Vent Mode: AC/VC+(placed back on ac due to ^ wob) Rate Set: 16 bmp/Vt Ordered: 350 mL/ /FiO2 : 35 %           IV:   dexmedetomidine Stopped (21 1030)    dextrose      propofol Stopped (21 0700)       Vitals:  BP (!) 155/52   Pulse 92   Temp 99.3 °F (37.4 °C) (Bladder)   Resp 19   Ht 4' 11\" (1.499 m)   Wt 153 lb 14.1 oz (69.8 kg)   LMP  (LMP Unknown)   SpO2 100%   BMI 31.08 kg/m²    Tmax:        Intake/Output Summary (Last 24 hours) at 3/24/2021 1009  Last data filed at 3/24/2021 8717  Gross per 24 hour   Intake 1593 ml   Output 865 ml   Net 728 ml       EXAM:  General: Sedated, on vent, no distress  Head: normocephalic, atraumatic  Eyes:No gross abnormalities. ENT:  MMM no lesions, ET and OG tube in  Neck:  supple and no masses  Chest : Good air movement, no wheezing, bibasilar rales, nontender, tympanic  Heart[de-identified] Heart sounds are normal.  Regular rate and rhythm without murmur, gallop or rub. ABD:  symmetric, soft, non-tender, no guarding or rebound  Musculoskeletal : no cyanosis, no clubbing and no edema  Neuro: Awake, follows commands, moves all 4 extremities but weak  Skin: No rashes or nodules noted.   Lymph node:  no cervical nodes  Urology: Yes Sanchez   Psychiatric: Awake and calm    Medications:  Scheduled Meds:   insulin glargine  20 Units Subcutaneous Nightly    methylPREDNISolone  40 mg Intravenous Daily    pantoprazole  40 mg Intravenous Daily    And    sodium chloride (PF)  10 mL Intravenous Daily    meropenem  1,000 mg Intravenous Q12H    metoprolol  5 mg Intravenous Q6H    nitroglycerin  0.5 inch Topical 4 times per day    heparin (porcine)  5,000 Units Subcutaneous 3 times per day    docusate  100 mg Oral BID    polyethylene glycol  17 g Oral Daily    budesonide  0.5 mg Nebulization BID    insulin lispro  0-18 Units Subcutaneous Q6H    chlorhexidine  15 mL Mouth/Throat BID    sodium chloride flush  10 mL Intravenous 2 times per day    ipratropium-albuterol  1 ampule Inhalation 4x daily       PRN Meds:  hydrALAZINE **OR** hydrALAZINE, glucose, dextrose, glucagon (rDNA), dextrose, labetalol, labetalol, promethazine **OR** ondansetron, sodium chloride flush, acetaminophen **OR** acetaminophen    Results: reviewed by me   CBC:   Recent Labs     03/22/21  0530 03/23/21  0757 03/23/21  1603 03/24/21  0526 03/24/21  0949   WBC 9.6 12.7*  --  19.9*  --    HGB 8.7* 9.5* 9.2* 8.9* 9.0*   HCT 27.2* 30.2*  --  28.5*  --    MCV 95.2 92.6  --  93.4  --     291  --  329  --      BMP:   Recent Labs     03/22/21  0527 03/23/21  0648 03/23/21  1603 03/24/21  0526 03/24/21  0949   * 142  --  139  --    K 3.9 4.4  --  4.2  --    * 112*  --  107  --    CO2 25 20  --  21  --    PHOS 3.1 3.3  --  2.6  --    BUN 31* 36*  --  44*  --    CREATININE 1.88* 1.81* 2.0* 1.98* 2.1*     LIVER PROFILE:   Recent Labs     03/22/21  0527 03/23/21  0648 03/24/21  0526   AST 46* 41* 28   * 87* 68*   BILITOT <0.2 <0.2 <0.2   ALKPHOS 97 109 120     PT/INR:   Recent Labs     03/22/21  0528 03/23/21  0649 03/24/21  0526   PROTIME 16.2* 15.3* 14.6   INR 1.3 1.2 1.1     APTT:   Recent Labs     03/22/21  0528 03/23/21  0649 03/24/21  0526   APTT 38.0* 27.8 38.2*     UA:No results for input(s): NITRITE, COLORU, PHUR, LABCAST, WBCUA, RBCUA, MUCUS, TRICHOMONAS, YEAST, BACTERIA, CLARITYU, SPECGRAV, LEUKOCYTESUR, UROBILINOGEN, BILIRUBINUR, BLOODU, GLUCOSEU, AMORPHOUS in the last 72 hours. Invalid input(s): Ron Mendoza    Cultures:  Negative so far  Films:  CXR reviewed by me and it showed left lower lobe atelectasis/effusion, slightly congested. Assessment:   This is a critically ill patient at risk of deterioration / death , needing close ICU monitoring and intervention due to below noted problems   · Acute hypoxic respiratory failure due to cardiac arrest  · Cardiac arrest, possibly induced by hyperkalemia  · Acute on chronic kidney injury, improving  · Shock physiology resolved  · Diabetes  · High tube feed residuals, likely gastroparesis    Recommendations  · Vent support lung protective strategy  · head of the bed 30°  · Breathing trial today and sedation holiday   · Minimize to no sedation  · Watch for ICU delirium: TV on, natural light, avoid benzos, pain control, early mobility, and family engagement  · PUD prophylaxis  · DVT prophylaxis  · Continue cardioprotective meds  · Continue Lantus increased to 20units daily  · Monitor blood sugar target 140180  · Monitor urine output  · Repeat Lasix x1 today  · Continue Solu-Medrol  · Started meropenem yesterday and continue same, follow-up cultures  · Start physical therapy, up in chair today      Discussed with patient , likely will need tracheostomy, he does not wish to do tracheostomy, he is willing to try for the next 24 to 48 hours and if she continues to fail breathing trials or having weak cough he will consider extubation with no reintubation option. We will continue to optimize her respiratory status as much as possible meanwhile    Due to the immediate potential for life-threatening deterioration due to acute respiratory failure I spent 33 minutes providing critical care.  This time is excluding time spent performing procedures.           Electronically signed by Magdalene Craft MD,  [facility] ,on 3/24/2021 at 10:09 AM

## 2021-03-24 NOTE — PROGRESS NOTES
[facility] Occupational Therapy      Date: 3/24/2021  Patient Name: Angelique Zavala        MRN: [id number]  Account: [de-identified]   : 1945  (67 y.o.)  Room: [place] [address]    Chart reviewed, attempted OT at 1450 for eval. Patient not seen 2° to:    Hold per nursing request due to: Pt. intubated and sedated and unable to participate at this time. Spoke to [facility], RN aware. Will attempt again when able.     Electronically signed by MONICA Huerta on 3/24/2021 at 2:54 PM

## 2021-03-24 NOTE — PROGRESS NOTES
0700: stopped propofol in preparation for cpap trial today. Will address in rounds, BUN increasing, output lower, temp, which was started on merrem yesterday. Bc, urine and resp cultures all sent last evening waiting on results. Blood sugars today lower 200's vs mid 200's, insulin adjusted yesterday ( lantus). 0741: placed on cpap, abg's in 2 hours at 0941am.     0950: back on a/c mode, labored breathing noted inability to cough noted. Able to wiggle toes and hand grasps, but remains more lethargic.     1139: remains stable, no issues

## 2021-03-24 NOTE — PROGRESS NOTES
Nephrology Progress Note    Assessment:  SAÚL renal status same non oiliguric  Hypoxia encephalopathy   OHDX  Hx DM type-2        Plan:on ventilator  Outcome as per CNS improvement or not    Patient Active Problem List:     Hypertension     Hyperlipidemia     Uncontrolled type 2 diabetes mellitus with complication, without long-term current use of insulin (HCC)     Fibromyalgia     Anxiety     Smoker     Insomnia     DJD (degenerative joint disease), lumbar     Lumbosacral radiculopathy at S1     DDD (degenerative disc disease), lumbar     Dysphonia     CTS (carpal tunnel syndrome)     High risk medication use-Norco - 12/20/17 [facility] PM&R, 02/20/18 [facility] PM&R, 03/07/18 [facility] PM&R, Urine Drug screen negative 02/06/17 PM&R--MED CONTRACT 2/6/17     SOB (shortness of breath) on exertion     Chest pain     Memory deficit     Artificial lens present     Presbyopia     Astigmatism, regular     Cataract, nuclear sclerotic senile     IDDM (insulin dependent diabetes mellitus)     Regular astigmatism     Nuclear senile cataract     Neck pain     Lumbosacral radiculopathy at L5     Spinal stenosis of lumbar region with neurogenic claudication     Impaired mobility and activities of daily living     Non-compliant patient NO showed FU 1/10/17 Dr Edd Briones     Insomnia secondary to chronic pain     Reactive depression     Diabetic radiculopathy (HCC)     Cervical radicular pain     Diabetic asymmetric polyneuropathy (HCC)     Myalgia     Intercostal neuropathy     Osteoarthritis of spine with radiculopathy, lumbar region     Acute combined systolic and diastolic CHF, NYHA class 1 (HCC)     PMB (postmenopausal bleeding)     Acute on chronic diastolic heart failure (HCC)     CHF (congestive heart failure) ([facility] [address]     Hypertensive urgency     Uncontrolled type 2 diabetes mellitus with hyperglycemia (HCC)     Chronic obstructive pulmonary disease with acute exacerbation (HCC)     Acute on chronic diastolic (congestive) heart failure ([facility] [address]     SAÚL (acute kidney injury) ([facility] [address]     Hypoglycemia     HOCM (hypertrophic obstructive cardiomyopathy) ([facility] [address]     Gastrointestinal hemorrhage     COPD exacerbation (HCC)     Anemia     AVM (arteriovenous malformation)     Symptomatic anemia     Flash pulmonary edema (HCC)     Acute on chronic kidney failure (HCC)     Dysarthria     Chronic fatigue     Acute encephalopathy     Adenomatous polyp of sigmoid colon     Adenomatous polyp of transverse colon     Sepsis (HCC)     Major depressive disorder in remission ([facility] [address]     Gastroesophageal reflux disease without esophagitis     Acute cystitis without hematuria     CKD (chronic kidney disease) stage 4, GFR 15-29 ml/min ([facility])     Cardiopulmonary arrest ([facility] [address]     Gait abnormality     Late effects of CVA (cerebrovascular accident)     Cardiac arrest ([facility]     Acute respiratory failure with hypoxia ([facility])      Subjective:  Admit Date: 3/18/2021    Interval History: eyes open no rsponce    Medications:  Scheduled Meds:   insulin glargine  20 Units Subcutaneous Nightly    methylPREDNISolone  40 mg Intravenous Daily    pantoprazole  40 mg Intravenous Daily    And    sodium chloride (PF)  10 mL Intravenous Daily    meropenem  1,000 mg Intravenous Q12H    metoprolol  5 mg Intravenous Q6H    nitroglycerin  0.5 inch Topical 4 times per day    heparin (porcine)  5,000 Units Subcutaneous 3 times per day    docusate  100 mg Oral BID    polyethylene glycol  17 g Oral Daily    budesonide  0.5 mg Nebulization BID    insulin lispro  0-18 Units Subcutaneous Q6H    chlorhexidine  15 mL Mouth/Throat BID    sodium chloride flush  10 mL Intravenous 2 times per day    ipratropium-albuterol  1 ampule Inhalation 4x daily     Continuous Infusions:   dexmedetomidine Stopped (03/23/21 1030)    dextrose      propofol Stopped (03/24/21 0700)       CBC:   Recent Labs     03/23/21  0757 03/23/21  0757 03/24/21  0526 03/24/21  0949   WBC 12.7*  --  19.9*  --    HGB 9.5*   < > 8.9* 9.0*     --  329  --     < > = values in this interval not displayed. CMP:    Recent Labs     03/22/21  0527 03/23/21  0648 03/23/21  1603 03/24/21  0526 03/24/21  0949   * 142  --  139  --    K 3.9 4.4  --  4.2  --    * 112*  --  107  --    CO2 25 20  --  21  --    BUN 31* 36*  --  44*  --    CREATININE 1.88* 1.81* 2.0* 1.98* 2.1*   GLUCOSE 185* 235*  --  214*  --    CALCIUM 8.5 9.0  --  9.5  --    LABGLOM 26.0* 27.1* 24* 24.5* 23*     Troponin: No results for input(s): TROPONINI in the last 72 hours. BNP: No results for input(s): BNP in the last 72 hours. INR:   Recent Labs     03/24/21  0526   INR 1.1     Lipids: No results for input(s): CHOL, LDLDIRECT, TRIG, HDL, AMYLASE, LIPASE in the last 72 hours. Liver:   Recent Labs     03/24/21  0526   AST 28   ALT 68*   ALKPHOS 120   PROT 6.6   LABALBU 2.9*   BILITOT <0.2     Iron:  No results for input(s): IRONS, FERRITIN in the last 72 hours. Invalid input(s): LABIRONS  Urinalysis: No results for input(s): UA in the last 72 hours.     Objective:  Vitals: BP (!) 155/52   Pulse 92   Temp 99.3 °F (37.4 °C) (Bladder)   Resp 19   Ht 4' 11\" (1.499 m)   Wt 153 lb 14.1 oz (69.8 kg)   LMP  (LMP Unknown)   SpO2 100%   BMI 31.08 kg/m²    Wt Readings from Last 3 Encounters:   03/24/21 153 lb 14.1 oz (69.8 kg)   01/15/21 131 lb (59.4 kg)   11/27/20 120 lb (54.4 kg)      24HR INTAKE/OUTPUT:      Intake/Output Summary (Last 24 hours) at 3/24/2021 1017  Last data filed at 3/24/2021 1725  Gross per 24 hour   Intake 1593 ml   Output 865 ml   Net 728 ml       General: alert, in no apparent distress  HEENT: normocephalic, atraumatic, anicteric  Neck: supple, no mass  Lungs: non-labored respirations, clear to auscultation bilaterally  Heart: regular rate and rhythm, no murmurs or rubs  Abdomen: soft, non-tender, non-distended  Ext: no cyanosis, no peripheral edema  Neuro: alert and oriented, no gross abnormalities  Psych: normal mood and affect  Skin: no rash      Electronically signed by Melissa Ramos MD

## 2021-03-24 NOTE — PROGRESS NOTES
PHARMACY NOTE:    Merrem 1 g IV Q12H changed to Merrem 500 mg IV every 12 hours due to a CrCl of < 25 mL/min. CREATININE   Date Value Ref Range Status   03/24/2021 1.98 (H) 0.50 - 0.90 mg/dL Final     POC Creatinine   Date Value Ref Range Status   03/24/2021 2.1 (H) 0.6 - 1.2 mg/dL Final     Calculated CrCl = 20 mL/min    Pharmacy will continue to monitor renal function daily.

## 2021-03-25 ENCOUNTER — APPOINTMENT (OUTPATIENT)
Dept: GENERAL RADIOLOGY | Age: 76
DRG: 296 | End: 2021-03-25
Payer: MEDICARE

## 2021-03-25 LAB
ALBUMIN SERPL-MCNC: 3.1 G/DL (ref 3.5–4.6)
ALBUMIN SERPL-MCNC: 3.2 G/DL (ref 3.5–4.6)
ALP BLD-CCNC: 123 U/L (ref 40–130)
ALT SERPL-CCNC: 46 U/L (ref 0–33)
ANION GAP SERPL CALCULATED.3IONS-SCNC: 13 MEQ/L (ref 9–15)
ANION GAP SERPL CALCULATED.3IONS-SCNC: 13 MEQ/L (ref 9–15)
APTT: 45.2 SEC (ref 24.4–36.8)
AST SERPL-CCNC: 15 U/L (ref 0–35)
BASE EXCESS ARTERIAL: 1 (ref -3–3)
BASOPHILS ABSOLUTE: 0.1 K/UL (ref 0–0.2)
BASOPHILS RELATIVE PERCENT: 0.5 %
BILIRUB SERPL-MCNC: <0.2 MG/DL (ref 0.2–0.7)
BUN BLDV-MCNC: 52 MG/DL (ref 8–23)
BUN BLDV-MCNC: 52 MG/DL (ref 8–23)
CALCIUM IONIZED, CALC AT PH 7.4: 1.3 MMOL/L (ref 1.11–1.3)
CALCIUM IONIZED: 1.3 MMOL/L (ref 1.11–1.3)
CALCIUM IONIZED: 1.31 MMOL/L (ref 1.12–1.32)
CALCIUM SERPL-MCNC: 10 MG/DL (ref 8.5–9.9)
CALCIUM SERPL-MCNC: 10 MG/DL (ref 8.5–9.9)
CHLORIDE BLD-SCNC: 109 MEQ/L (ref 95–107)
CHLORIDE BLD-SCNC: 110 MEQ/L (ref 95–107)
CO2: 22 MEQ/L (ref 20–31)
CO2: 22 MEQ/L (ref 20–31)
CREAT SERPL-MCNC: 1.63 MG/DL (ref 0.5–0.9)
CREAT SERPL-MCNC: 1.72 MG/DL (ref 0.5–0.9)
EOSINOPHILS ABSOLUTE: 0 K/UL (ref 0–0.7)
EOSINOPHILS RELATIVE PERCENT: 0 %
GFR AFRICAN AMERICAN: 29
GFR AFRICAN AMERICAN: 34.8
GFR AFRICAN AMERICAN: 37.1
GFR NON-AFRICAN AMERICAN: 24
GFR NON-AFRICAN AMERICAN: 28.8
GFR NON-AFRICAN AMERICAN: 30.6
GLOBULIN: 3.7 G/DL (ref 2.3–3.5)
GLUCOSE BLD-MCNC: 279 MG/DL (ref 60–115)
GLUCOSE BLD-MCNC: 328 MG/DL (ref 60–115)
GLUCOSE BLD-MCNC: 339 MG/DL (ref 60–115)
GLUCOSE BLD-MCNC: 370 MG/DL (ref 70–99)
GLUCOSE BLD-MCNC: 374 MG/DL (ref 70–99)
GLUCOSE BLD-MCNC: 397 MG/DL (ref 60–115)
GLUCOSE BLD-MCNC: 400 MG/DL (ref 60–115)
GLUCOSE BLD-MCNC: 400 MG/DL (ref 60–115)
HCO3 ARTERIAL: 25.6 MMOL/L (ref 21–29)
HCT VFR BLD CALC: 29 % (ref 37–47)
HEMOGLOBIN: 13.5 GM/DL (ref 12–16)
HEMOGLOBIN: 9.2 G/DL (ref 12–16)
INR BLD: 1.2
LACTATE: 0.5 MMOL/L (ref 0.4–2)
LYMPHOCYTES ABSOLUTE: 0.3 K/UL (ref 1–4.8)
LYMPHOCYTES RELATIVE PERCENT: 1.8 %
MAGNESIUM: 2.7 MG/DL (ref 1.7–2.4)
MCH RBC QN AUTO: 29.2 PG (ref 27–31.3)
MCHC RBC AUTO-ENTMCNC: 31.7 % (ref 33–37)
MCV RBC AUTO: 92.1 FL (ref 82–100)
MONOCYTES ABSOLUTE: 0.5 K/UL (ref 0.2–0.8)
MONOCYTES RELATIVE PERCENT: 2.8 %
NEUTROPHILS ABSOLUTE: 17 K/UL (ref 1.4–6.5)
NEUTROPHILS RELATIVE PERCENT: 94.9 %
O2 SAT, ARTERIAL: 99 % (ref 93–100)
PCO2 ARTERIAL: 43 MM HG (ref 35–45)
PDW BLD-RTO: 17.3 % (ref 11.5–14.5)
PERFORMED ON: ABNORMAL
PH ARTERIAL: 7.38 (ref 7.35–7.45)
PHOSPHORUS: 3 MG/DL (ref 2.3–4.8)
PHOSPHORUS: 3 MG/DL (ref 2.3–4.8)
PLATELET # BLD: 374 K/UL (ref 130–400)
PO2 ARTERIAL: 118 MM HG (ref 75–108)
POC CHLORIDE: 110 MEQ/L (ref 99–110)
POC CREATININE: 2 MG/DL (ref 0.6–1.2)
POC FIO2: 35
POC HEMATOCRIT: 40 % (ref 36–48)
POC POTASSIUM: 4.2 MEQ/L (ref 3.5–5.1)
POC SAMPLE TYPE: ABNORMAL
POC SODIUM: 144 MEQ/L (ref 136–145)
POTASSIUM SERPL-SCNC: 4.6 MEQ/L (ref 3.4–4.9)
POTASSIUM SERPL-SCNC: 4.6 MEQ/L (ref 3.4–4.9)
PROCALCITONIN: 0.25 NG/ML (ref 0–0.15)
PROTHROMBIN TIME: 14.8 SEC (ref 12.3–14.9)
RBC # BLD: 3.15 M/UL (ref 4.2–5.4)
SODIUM BLD-SCNC: 144 MEQ/L (ref 135–144)
SODIUM BLD-SCNC: 145 MEQ/L (ref 135–144)
TCO2 ARTERIAL: 27 (ref 22–29)
TOTAL PROTEIN: 6.8 G/DL (ref 6.3–8)
WBC # BLD: 17.9 K/UL (ref 4.8–10.8)

## 2021-03-25 PROCEDURE — 36600 WITHDRAWAL OF ARTERIAL BLOOD: CPT

## 2021-03-25 PROCEDURE — 99233 SBSQ HOSP IP/OBS HIGH 50: CPT | Performed by: INTERNAL MEDICINE

## 2021-03-25 PROCEDURE — 84145 PROCALCITONIN (PCT): CPT

## 2021-03-25 PROCEDURE — C9113 INJ PANTOPRAZOLE SODIUM, VIA: HCPCS | Performed by: INTERNAL MEDICINE

## 2021-03-25 PROCEDURE — 6360000002 HC RX W HCPCS: Performed by: INTERNAL MEDICINE

## 2021-03-25 PROCEDURE — 83605 ASSAY OF LACTIC ACID: CPT

## 2021-03-25 PROCEDURE — 85610 PROTHROMBIN TIME: CPT

## 2021-03-25 PROCEDURE — 6370000000 HC RX 637 (ALT 250 FOR IP): Performed by: INTERNAL MEDICINE

## 2021-03-25 PROCEDURE — 31500 INSERT EMERGENCY AIRWAY: CPT

## 2021-03-25 PROCEDURE — 2580000003 HC RX 258: Performed by: INTERNAL MEDICINE

## 2021-03-25 PROCEDURE — 80053 COMPREHEN METABOLIC PANEL: CPT

## 2021-03-25 PROCEDURE — 84295 ASSAY OF SERUM SODIUM: CPT

## 2021-03-25 PROCEDURE — 2500000003 HC RX 250 WO HCPCS: Performed by: INTERNAL MEDICINE

## 2021-03-25 PROCEDURE — 83735 ASSAY OF MAGNESIUM: CPT

## 2021-03-25 PROCEDURE — 94003 VENT MGMT INPAT SUBQ DAY: CPT

## 2021-03-25 PROCEDURE — 99291 CRITICAL CARE FIRST HOUR: CPT | Performed by: INTERNAL MEDICINE

## 2021-03-25 PROCEDURE — 99233 SBSQ HOSP IP/OBS HIGH 50: CPT | Performed by: PSYCHIATRY & NEUROLOGY

## 2021-03-25 PROCEDURE — 85730 THROMBOPLASTIN TIME PARTIAL: CPT

## 2021-03-25 PROCEDURE — 84132 ASSAY OF SERUM POTASSIUM: CPT

## 2021-03-25 PROCEDURE — 85025 COMPLETE CBC W/AUTO DIFF WBC: CPT

## 2021-03-25 PROCEDURE — 82948 REAGENT STRIP/BLOOD GLUCOSE: CPT

## 2021-03-25 PROCEDURE — 94761 N-INVAS EAR/PLS OXIMETRY MLT: CPT

## 2021-03-25 PROCEDURE — 82330 ASSAY OF CALCIUM: CPT

## 2021-03-25 PROCEDURE — 85014 HEMATOCRIT: CPT

## 2021-03-25 PROCEDURE — 36415 COLL VENOUS BLD VENIPUNCTURE: CPT

## 2021-03-25 PROCEDURE — 2000000000 HC ICU R&B

## 2021-03-25 PROCEDURE — 80069 RENAL FUNCTION PANEL: CPT

## 2021-03-25 PROCEDURE — 71045 X-RAY EXAM CHEST 1 VIEW: CPT

## 2021-03-25 PROCEDURE — 2700000000 HC OXYGEN THERAPY PER DAY

## 2021-03-25 PROCEDURE — 82435 ASSAY OF BLOOD CHLORIDE: CPT

## 2021-03-25 PROCEDURE — 82803 BLOOD GASES ANY COMBINATION: CPT

## 2021-03-25 PROCEDURE — 84100 ASSAY OF PHOSPHORUS: CPT

## 2021-03-25 PROCEDURE — 82565 ASSAY OF CREATININE: CPT

## 2021-03-25 PROCEDURE — 94640 AIRWAY INHALATION TREATMENT: CPT

## 2021-03-25 PROCEDURE — 94799 UNLISTED PULMONARY SVC/PX: CPT

## 2021-03-25 RX ORDER — METOPROLOL TARTRATE 5 MG/5ML
7.5 INJECTION INTRAVENOUS EVERY 4 HOURS
Status: DISCONTINUED | OUTPATIENT
Start: 2021-03-25 | End: 2021-03-26

## 2021-03-25 RX ORDER — INSULIN GLARGINE 100 [IU]/ML
50 INJECTION, SOLUTION SUBCUTANEOUS NIGHTLY
Status: DISCONTINUED | OUTPATIENT
Start: 2021-03-25 | End: 2021-03-26 | Stop reason: SDUPTHER

## 2021-03-25 RX ADMIN — NITROGLYCERIN 1 INCH: 20 OINTMENT TOPICAL at 17:26

## 2021-03-25 RX ADMIN — PROPOFOL 35 MCG/KG/MIN: 10 INJECTION, EMULSION INTRAVENOUS at 20:00

## 2021-03-25 RX ADMIN — METOPROLOL TARTRATE 7.5 MG: 5 INJECTION INTRAVENOUS at 20:00

## 2021-03-25 RX ADMIN — SODIUM CHLORIDE, PRESERVATIVE FREE 10 ML: 5 INJECTION INTRAVENOUS at 08:18

## 2021-03-25 RX ADMIN — CHLORHEXIDINE GLUCONATE 15 ML: 1.2 RINSE ORAL at 20:00

## 2021-03-25 RX ADMIN — METOPROLOL TARTRATE 7.5 MG: 5 INJECTION INTRAVENOUS at 17:26

## 2021-03-25 RX ADMIN — PROPOFOL 40 MCG/KG/MIN: 10 INJECTION, EMULSION INTRAVENOUS at 04:43

## 2021-03-25 RX ADMIN — MEROPENEM 500 MG: 500 INJECTION, POWDER, FOR SOLUTION INTRAVENOUS at 20:00

## 2021-03-25 RX ADMIN — PROPOFOL 35 MCG/KG/MIN: 10 INJECTION, EMULSION INTRAVENOUS at 14:54

## 2021-03-25 RX ADMIN — IPRATROPIUM BROMIDE AND ALBUTEROL SULFATE 1 AMPULE: .5; 3 SOLUTION RESPIRATORY (INHALATION) at 19:04

## 2021-03-25 RX ADMIN — MEROPENEM 500 MG: 500 INJECTION, POWDER, FOR SOLUTION INTRAVENOUS at 08:17

## 2021-03-25 RX ADMIN — METOPROLOL TARTRATE 5 MG: 1 INJECTION, SOLUTION INTRAVENOUS at 04:34

## 2021-03-25 RX ADMIN — HEPARIN SODIUM 5000 UNITS: 5000 INJECTION INTRAVENOUS; SUBCUTANEOUS at 22:08

## 2021-03-25 RX ADMIN — Medication 10 ML: at 20:53

## 2021-03-25 RX ADMIN — IPRATROPIUM BROMIDE AND ALBUTEROL SULFATE 1 AMPULE: .5; 3 SOLUTION RESPIRATORY (INHALATION) at 16:02

## 2021-03-25 RX ADMIN — HYDRALAZINE HYDROCHLORIDE 20 MG: 20 INJECTION INTRAMUSCULAR; INTRAVENOUS at 01:16

## 2021-03-25 RX ADMIN — METOPROLOL TARTRATE 7.5 MG: 5 INJECTION INTRAVENOUS at 13:18

## 2021-03-25 RX ADMIN — METOPROLOL TARTRATE 5 MG: 1 INJECTION, SOLUTION INTRAVENOUS at 08:18

## 2021-03-25 RX ADMIN — HYDRALAZINE HYDROCHLORIDE 20 MG: 20 INJECTION INTRAMUSCULAR; INTRAVENOUS at 20:51

## 2021-03-25 RX ADMIN — SODIUM CHLORIDE 0.2 MCG/KG/HR: 9 INJECTION, SOLUTION INTRAVENOUS at 23:51

## 2021-03-25 RX ADMIN — NITROGLYCERIN 1 INCH: 20 OINTMENT TOPICAL at 13:18

## 2021-03-25 RX ADMIN — HYDRALAZINE HYDROCHLORIDE 20 MG: 20 INJECTION INTRAMUSCULAR; INTRAVENOUS at 09:49

## 2021-03-25 RX ADMIN — HEPARIN SODIUM 5000 UNITS: 5000 INJECTION INTRAVENOUS; SUBCUTANEOUS at 13:14

## 2021-03-25 RX ADMIN — METHYLPREDNISOLONE SODIUM SUCCINATE 40 MG: 40 INJECTION, POWDER, FOR SOLUTION INTRAMUSCULAR; INTRAVENOUS at 08:17

## 2021-03-25 RX ADMIN — ACETAMINOPHEN 650 MG: 325 TABLET ORAL at 21:53

## 2021-03-25 RX ADMIN — ACETAMINOPHEN 650 MG: 650 SUPPOSITORY RECTAL at 01:53

## 2021-03-25 RX ADMIN — PANTOPRAZOLE SODIUM 40 MG: 40 INJECTION, POWDER, FOR SOLUTION INTRAVENOUS at 08:18

## 2021-03-25 RX ADMIN — INSULIN GLARGINE 50 UNITS: 100 INJECTION, SOLUTION SUBCUTANEOUS at 23:42

## 2021-03-25 RX ADMIN — Medication 10 ML: at 08:18

## 2021-03-25 RX ADMIN — DOCUSATE SODIUM 100 MG: 50 LIQUID ORAL at 08:17

## 2021-03-25 RX ADMIN — HEPARIN SODIUM 5000 UNITS: 5000 INJECTION INTRAVENOUS; SUBCUTANEOUS at 06:50

## 2021-03-25 RX ADMIN — DOCUSATE SODIUM 100 MG: 50 LIQUID ORAL at 20:00

## 2021-03-25 RX ADMIN — CHLORHEXIDINE GLUCONATE 15 ML: 1.2 RINSE ORAL at 08:18

## 2021-03-25 RX ADMIN — POLYETHYLENE GLYCOL 3350 17 G: 17 POWDER, FOR SOLUTION ORAL at 08:17

## 2021-03-25 RX ADMIN — NITROGLYCERIN 0.5 INCH: 20 OINTMENT TOPICAL at 06:22

## 2021-03-25 RX ADMIN — NITROGLYCERIN 1 INCH: 20 OINTMENT TOPICAL at 23:52

## 2021-03-25 RX ADMIN — IPRATROPIUM BROMIDE AND ALBUTEROL SULFATE 1 AMPULE: .5; 3 SOLUTION RESPIRATORY (INHALATION) at 10:08

## 2021-03-25 RX ADMIN — BUDESONIDE 500 MCG: 0.5 SUSPENSION RESPIRATORY (INHALATION) at 16:02

## 2021-03-25 RX ADMIN — SODIUM CHLORIDE 0.2 MCG/KG/HR: 9 INJECTION, SOLUTION INTRAVENOUS at 23:41

## 2021-03-25 ASSESSMENT — PULMONARY FUNCTION TESTS
PIF_VALUE: 21
PIF_VALUE: 18
PIF_VALUE: 22
PIF_VALUE: 33
PIF_VALUE: 21
PIF_VALUE: 24
PIF_VALUE: 24
PIF_VALUE: 14
PIF_VALUE: 22
PIF_VALUE: 22
PIF_VALUE: 21
PIF_VALUE: 20
PIF_VALUE: 22
PIF_VALUE: 10
PIF_VALUE: 17
PIF_VALUE: 23
PIF_VALUE: 25
PIF_VALUE: 17
PIF_VALUE: 9
PIF_VALUE: 33
PIF_VALUE: 22
PIF_VALUE: 16
PIF_VALUE: 21
PIF_VALUE: 22
PIF_VALUE: 22
PIF_VALUE: 19
PIF_VALUE: 18
PIF_VALUE: 10
PIF_VALUE: 20
PIF_VALUE: 28
PIF_VALUE: 18
PIF_VALUE: 21
PIF_VALUE: 22
PIF_VALUE: 21
PIF_VALUE: 16
PIF_VALUE: 10
PIF_VALUE: 22
PIF_VALUE: 24
PIF_VALUE: 17
PIF_VALUE: 21
PIF_VALUE: 9
PIF_VALUE: 26
PIF_VALUE: 16
PIF_VALUE: 21
PIF_VALUE: 19
PIF_VALUE: 22
PIF_VALUE: 13
PIF_VALUE: 10
PIF_VALUE: 22
PIF_VALUE: 9
PIF_VALUE: 16
PIF_VALUE: 27
PIF_VALUE: 14
PIF_VALUE: 28
PIF_VALUE: 17
PIF_VALUE: 20
PIF_VALUE: 21
PIF_VALUE: 23
PIF_VALUE: 18
PIF_VALUE: 19
PIF_VALUE: 22
PIF_VALUE: 21
PIF_VALUE: 17
PIF_VALUE: 18
PIF_VALUE: 25
PIF_VALUE: 22
PIF_VALUE: 18
PIF_VALUE: 10
PIF_VALUE: 16
PIF_VALUE: 9
PIF_VALUE: 24
PIF_VALUE: 26
PIF_VALUE: 10
PIF_VALUE: 18

## 2021-03-25 ASSESSMENT — PAIN SCALES - GENERAL
PAINLEVEL_OUTOF10: 0

## 2021-03-25 NOTE — PROGRESS NOTES
Neurology Follow up    SUBJECTIVE: Patient remains intubated on light sedation. She does awaken with name but does not follow any commands. Sedation was discontinued for a brief period of time and she moves all extremities but did not follow commands according to the nurse  Patient was not able to be extubated and sedation has just been discontinued she is barely following commands. Case was discussed with     Patient again appears to be lethargic but noted to follow some commands she cannot sustain respiration though.     Current Facility-Administered Medications   Medication Dose Route Frequency Provider Last Rate Last Admin    insulin glargine (LANTUS) injection vial 50 Units  50 Units Subcutaneous Nightly Elida Rausch MD        metoprolol (LOPRESSOR) injection 7.5 mg  7.5 mg Intravenous Q4H Marquita Plummer MD        nitroglycerin (NITRO-BID) 2 % ointment 1 inch  1 inch Topical 4 times per day Marquita Plummer MD        meropenem (MERREM) 500 mg IVPB (mini-bag)  500 mg Intravenous Q12H Daysi Marley MD   Stopped at 03/25/21 0900    methylPREDNISolone sodium (SOLU-MEDROL) injection 40 mg  40 mg Intravenous Daily Daysi Marley MD   40 mg at 03/25/21 0817    pantoprazole (PROTONIX) injection 40 mg  40 mg Intravenous Daily Daysi Marley MD   40 mg at 03/25/21 0818    And    sodium chloride (PF) 0.9 % injection 10 mL  10 mL Intravenous Daily Daysi Marley MD   10 mL at 03/25/21 0818    heparin (porcine) injection 5,000 Units  5,000 Units Subcutaneous 3 times per day Daysi Marley MD   5,000 Units at 03/25/21 0650    dexmedetomidine (PRECEDEX) 400 mcg in sodium chloride 0.9 % 100 mL infusion  0.2-1.4 mcg/kg/hr Intravenous Continuous Daysi Marley MD   Stopped at 03/23/21 1030    docusate (COLACE) 50 MG/5ML liquid 100 mg  100 mg Oral BID Daysi Marley MD   100 mg at 03/25/21 0817    polyethylene glycol (GLYCOLAX) packet 17 g  17 g Oral Daily Daysi Marley MD   17 g at ipratropium-albuterol (DUONEB) nebulizer solution 1 ampule  1 ampule Inhalation 4x daily Ida Homans, MD   1 ampule at 03/25/21 1008       PHYSICAL EXAM:    BP (!) 190/67   Pulse 110   Temp 99 °F (37.2 °C) (Bladder)   Resp 30   Ht 4' 11\" (1.499 m)   Wt 154 lb 5.2 oz (70 kg)   LMP  (LMP Unknown)   SpO2 100%   BMI 31.17 kg/m²   General Appearance:      Skin:  normal  CVS - Normal sounds, No murmurs , No carotid Bruits  RS -CTA  Abdomen Soft, BS present  Review of Systems   Mental Status Exam:             She is lethargic due to sedation  Funduscopic Exam:     Cranial Nerves            Cranial nerve III           Pupils:  equal, round, reactive to light      Cranial nerves III, IV, VI           Extraocular Movements: intact        Motor: Unable to perform examination but she does withdraw all her extremities to pain            Sensory: Unable to perform        Pinprick             Right Upper Extremity:  normal             Left Upper Extremity:  normal             Right Lower Extremity:  normal             Left Lower Extremity:  normal           Vibration                         Touch            Proprioception                 Coordination:           Finger/Nose   Right:  normal              Left:  normal          Heel-Knee-Shin                Right:  normal              Left:  normal          Rapid Alternating Movements              Right:  normal              Left:  normal          Gait:                       Casual:  normal                         Romberg:  normal            Reflexes:             Deep Tendon Reflexes:             Reflexes are 2 +             Plantar response:                Right:  downgoing               Left:  downgoing    Vascular:  Cardiac Exam:  normal         Echocardiogram Complete 2d With Doppler With Color    Result Date: 3/19/2021  Transthoracic Echocardiography Report (TTE)  Demographics  Patient Name   Erenest Coral  Gender              Female                 M  Patient Number [id number]        Race                Black                                 Ethnicity  Visit Number   [id number]       Room Number         IC05  Corporate ID                   Date of Study       03/19/2021  Accession      [id number]      Referring Physician  Number  Date of Birth  1945      Sonographer         Zhanna Pacheco LPN  Age            67 year(s)      [address]  Cardiology                                 Physician           Carli Beltre MD Procedure Type of Study  TTE procedure:ECHO COMPLETE 2D W/DOP W/COLOR. Procedure Date Date: 03/19/2021 Start: 09:15 AM Study Location: Portable Technical Quality: Good visualization Indications:Cardiac arrest. Patient Status: Routine Weight: 143 pounds BP: 89/48 mmHg Allergies   - Other allergy:(Darvon). Conclusions  Summary  Mitral annular calcification is present. 1-2+ MR  Normal tricuspid valve structure and function. 1+ TR  RVSP 31 mmHg  severe CLVH  LVEF 50%  E/A flow reversal noted. Suggestive of diastolic dysfunction. Signature  ----------------------------------------------------------------  Electronically signed by Carli Beltre MD(Interpreting  physician) on 03/19/2021 02:13 PM  ----------------------------------------------------------------  Findings Left Ventricle severe CLVH LVEF 50% E/A flow reversal noted. Suggestive of diastolic dysfunction. Right Ventricle Normal right ventricle structure and function. Normal right ventricle systolic pressure. Left Atrium Moderately dilated left atrium. Right Atrium Normal right atrium. Mitral Valve Mitral annular calcification is present. 1-2+ MR Tricuspid Valve Normal tricuspid valve structure and function. 1+ TR RVSP 31 mmHg Aortic Valve Normal aortic valve structure and function. Pulmonic Valve The pulmonic valve was not well visualized . Pericardial Effusion No evidence of pericardial effusion.  Pleural Effusion No evidence of pleural effusion. Aorta \ Miscellaneous The aorta is within normal limits. M-Mode Measurements (cm)  LVIDd: 4.01 cm                         LVIDs: 1.85 cm  IVSd: 1.45 cm                          IVSs: 1.23 cm  LVPWd: 1.32 cm                         LVPWs: 1.61 cm  Rt. Vent.  Dimension: 1.07 cm           AO Root Dimension: 2.8 cm                                         ACS: 1.47 cm                                         LA: 3.04 cm                                         LVOT: 1.98 cm Doppler Measurements:  AV Velocity:0.01 m/s                   MV Peak E-Wave: 0.96 m/s  AV Peak Gradient: 7.73 mmHg            MV Peak A-Wave: 1.58 m/s  AV Mean Gradient: 3.01 mmHg  AV Area (Continuity):1.27 cm^2         MV P1/2t: 105.6 msec  TR Velocity:2.29 m/s                   MVA by PHT2.08 cm^2  TR Gradient:21.01 mmHg                 Estimated RAP:10 mmHg                                         RVSP:31 mmHg Valves  Mitral Valve  Peak E-Wave: 0.96 m/s          Peak A-Wave: 1.58 m/s  P1/2t: 105.6 msec              E/A Ratio: 0.61                                 Peak Gradient: 3.7 mmHg  Area (PHT): 2.08 cm^2          Deceleration Time: 349.6 msec  MR Velocity: 4.66 m/s  Aortic Valve  Peak Velocity: 1.39 m/s                Mean Velocity: 0.79 m/s  Peak Gradient: 7.73 mmHg               Mean Gradient: 3.01 mmHg  Area (continuity): 1.27 cm^2  AV VTI: 28.28 cm  Cusp Separation: 1.47 cm  Tricuspid Valve  Estimated RVSP: 31 mmHg              Estimated RAP: 10 mmHg  TR Velocity: 2.29 m/s                TR Gradient: 21.01 mmHg  Pulmonic Valve  Peak Velocity: 0.68 m/s           Peak Gradient: 1.86 mmHg                                    Estimated PASP: 31.01 mmHg  LVOT  Peak Velocity: 0.72 m/s              Mean Velocity: 0.41 m/s  Peak Gradient: 2 mmHg                Mean Gradient: 0.9 mmHg  LVOT Diameter: 1.98 cm               LVOT VTI: 11.64 cm Structures  Left Atrium  LA Dimension: 3.04 cm                 LA Area: 18.08 cm^2  LA/Aorta: 1.09  LA Volume/Index: 49.97 ml  Left Ventricle  Diastolic Dimension: 0.10 cm         Systolic Dimension: 1.40 cm  Septum Diastolic: 1.33 cm            Septum Systolic: 7.35 cm  PW Diastolic: 9.35 cm                PW Systolic: 2.36 cm                                       FS: 53.9 %  LV EDV/LV EDV Index: 70.29 ml        LV ESV/LV ESV Index: 10.47 ml  EF Calculated: 85.1 %  LVOT Diameter: 1.98 cm  Right Ventricle  Diastolic Dimension: 3.99 cm                                    RV Systolic Pressure: 72.58 mmHg Aorta/ Miscellaneous Aorta  Aortic Root: 2.8 cm  LVOT Diameter: 1.98 cm    Xr Chest Portable    Result Date: 3/19/2021  EXAMINATION: XR CHEST PORTABLE CLINICAL HISTORY: INTUBATION COMPARISONS: OCTOBER 9, 2020 FINDINGS: Endotracheal tube has migrated 3 cm inferiorly, just lying cephalad to mojgan. Nasogastric tube courses beneath diaphragm. Osseous structures intact. Cardiopericardial silhouette normal. Aorta calcified. Ill-defined area increased opacity right  lung base. RIGHT LOWER LUNG SUBSEGMENTAL ATELECTASIS/PNEUMONIA. INTERVAL INFERIOR MIGRATION ENDOTRACHEAL TUBE 3 CM WITH TIP NOW JUST PROXIMAL TO MOJGAN. MAY WISH TO RETRACT ENDOTRACHEAL TUBE 2 CM. Xr Chest Portable    Result Date: 3/19/2021  EXAMINATION: XR CHEST PORTABLE CLINICAL HISTORY: RESPIRATORY FAILURE COMPARISONS: MARCH 16, 2021 FINDINGS: Endotracheal tube tip 2.3 cm superior to mojgan. Nasogastric tube courses beneath diaphragm area osseous structures intact. Ill-defined areas increase opacity at lung bases. Cardiopericardial silhouette normal.     BIBASILAR SUBSEGMENTAL ATELECTASIS/PNEUMONIA IN THIS PARTIALLY EXPIRATORY CHEST RADIOGRAPH.       Recent Labs     03/23/21  0757 03/23/21  1603 03/24/21  0526 03/24/21  0949   WBC 12.7*  --  19.9*  --    HGB 9.5* 9.2* 8.9* 9.0*     --  329  --      Recent Labs     03/23/21  0648 03/23/21  1603 03/24/21  0526 03/24/21  0949     --  139  --    K 4.4  --  4.2  --    *  --  107  -- CO2 20  --  21  --    BUN 36*  --  44*  --    CREATININE 1.81* 2.0* 1.98* 2.1*   GLUCOSE 235*  --  214*  --      Recent Labs     03/23/21  0648 03/24/21  0526   BILITOT <0.2 <0.2   ALKPHOS 109 120   AST 41* 28   ALT 87* 68*     Lab Results   Component Value Date    PROTIME 14.6 03/24/2021    INR 1.1 03/24/2021     No results found for: LITHIUM, DILFRTOT, VALPROATE    ASSESSMENT AND PLAN  Encephalopathy secondary to cardiorespiratory arrest.  Patient may have some hypoxemic damage though this very difficult to certain as patient is sedated and intubated. We will keep an eye on this repeat CT scan did not anything significant. We will try and continue to lighten her sedation to reexamine. For now there is a nonfocal examination  Of the patient's brief extubation 2 days ago patient has not been able to extubate. She continues on sedation and sedation has just been discontinued and she is barely chest following command but become very weak. I am not quite sure he can sustain extubation. We will keep an eye on this. There appears to be some suggestion that the family does not want any further intervention in terms of trach and PEG though we will continue to discuss this. She does have some right-sided weakness though it is not very apparent and is very difficult examined at this time without sedation. Patient still appears to lethargic and we are awaiting extubation. According to pulmonary patient may require tracheostomy. Clotilde Nunez is not considering this we will give her some more time to recuperate    Critical care time 35 minutes    Mehul Ricketts MD, Ron Isaac, American Board of Psychiatry & Neurology  Board Certified in Vascular Neurology  Board Certified in Neuromuscular Medicine  Certified in . [address]

## 2021-03-25 NOTE — PROGRESS NOTES
Nephrology Progress Note    Assessment:  CKD-3  Encephlopathy  DM type-2 labile   On ventilator  Hx Hypertension  S/P cardiac arrest      Plan: manage ventilator by intensivest  Give nutrition  Follow labs watch blood sugar events  Uncertain prognosis d/t CNS issue  Patient Active Problem List:     Hypertension     Hyperlipidemia     Uncontrolled type 2 diabetes mellitus with complication, without long-term current use of insulin (HCC)     Fibromyalgia     Anxiety     Smoker     Insomnia     DJD (degenerative joint disease), lumbar     Lumbosacral radiculopathy at S1     DDD (degenerative disc disease), lumbar     Dysphonia     CTS (carpal tunnel syndrome)     High risk medication use-Norco - 12/20/17 [facility] PM&R, 02/20/18 [facility] PM&R, 03/07/18 [facility] PM&R, Urine Drug screen negative 02/06/17 PM&R--MED CONTRACT 2/6/17     SOB (shortness of breath) on exertion     Chest pain     Memory deficit     Artificial lens present     Presbyopia     Astigmatism, regular     Cataract, nuclear sclerotic senile     IDDM (insulin dependent diabetes mellitus)     Regular astigmatism     Nuclear senile cataract     Neck pain     Lumbosacral radiculopathy at L5     Spinal stenosis of lumbar region with neurogenic claudication     Impaired mobility and activities of daily living     Non-compliant patient NO showed FU 1/10/17 Dr Meri Ocampo     Insomnia secondary to chronic pain     Reactive depression     Diabetic radiculopathy (HCC)     Cervical radicular pain     Diabetic asymmetric polyneuropathy (HCC)     Myalgia     Intercostal neuropathy     Osteoarthritis of spine with radiculopathy, lumbar region     Acute combined systolic and diastolic CHF, NYHA class 1 (HCC)     PMB (postmenopausal bleeding)     Acute on chronic diastolic heart failure (HCC)     CHF (congestive heart failure) ([address]     Hypertensive urgency     Uncontrolled type 2 diabetes mellitus with hyperglycemia ([address]     Chronic obstructive pulmonary disease with acute exacerbation (HCC)     Acute on chronic diastolic (congestive) heart failure (HCC)     SAÚL (acute kidney injury) (HCC)     Hypoglycemia     HOCM (hypertrophic obstructive cardiomyopathy) (HCC)     Gastrointestinal hemorrhage     COPD exacerbation (HCC)     Anemia     AVM (arteriovenous malformation)     Symptomatic anemia     Flash pulmonary edema (HCC)     Acute on chronic kidney failure (HCC)     Dysarthria     Chronic fatigue     Acute encephalopathy     Adenomatous polyp of sigmoid colon     Adenomatous polyp of transverse colon     Sepsis (HCC)     Major depressive disorder in remission ([facility]     Gastroesophageal reflux disease without esophagitis     Acute cystitis without hematuria     CKD (chronic kidney disease) stage 4, GFR 15-29 ml/min (HCC)     Cardiopulmonary arrest ([facility]     Gait abnormality     Late effects of CVA (cerebrovascular accident)     Cardiac arrest ([facility]     Acute respiratory failure with hypoxia ([facility]      Subjective:  Admit Date: 3/18/2021    Interval History: no spont.  Motion sedaterd    Medications:  Scheduled Meds:   insulin glargine  50 Units Subcutaneous Nightly    meropenem  500 mg Intravenous Q12H    methylPREDNISolone  40 mg Intravenous Daily    pantoprazole  40 mg Intravenous Daily    And    sodium chloride (PF)  10 mL Intravenous Daily    metoprolol  5 mg Intravenous Q6H    nitroglycerin  0.5 inch Topical 4 times per day    heparin (porcine)  5,000 Units Subcutaneous 3 times per day    docusate  100 mg Oral BID    polyethylene glycol  17 g Oral Daily    budesonide  0.5 mg Nebulization BID    insulin lispro  0-18 Units Subcutaneous Q6H    chlorhexidine  15 mL Mouth/Throat BID    sodium chloride flush  10 mL Intravenous 2 times per day    ipratropium-albuterol  1 ampule Inhalation 4x daily     Continuous Infusions:   dexmedetomidine Stopped (03/23/21 1030)    dextrose      propofol Stopped (03/25/21 0735)       CBC:   Recent Labs     03/23/21  0757 03/23/21  5610 03/24/21  0526 03/24/21  0949   WBC 12.7*  --  19.9*  --    HGB 9.5*   < > 8.9* 9.0*     --  329  --     < > = values in this interval not displayed. CMP:    Recent Labs     03/23/21  0648 03/23/21  1603 03/24/21  0526 03/24/21  0949     --  139  --    K 4.4  --  4.2  --    *  --  107  --    CO2 20  --  21  --    BUN 36*  --  44*  --    CREATININE 1.81* 2.0* 1.98* 2.1*   GLUCOSE 235*  --  214*  --    CALCIUM 9.0  --  9.5  --    LABGLOM 27.1* 24* 24.5* 23*     Troponin: No results for input(s): TROPONINI in the last 72 hours. BNP: No results for input(s): BNP in the last 72 hours. INR:   Recent Labs     03/24/21 0526   INR 1.1     Lipids: No results for input(s): CHOL, LDLDIRECT, TRIG, HDL, AMYLASE, LIPASE in the last 72 hours. Liver:   Recent Labs     03/24/21 0526   AST 28   ALT 68*   ALKPHOS 120   PROT 6.6   LABALBU 2.9*   BILITOT <0.2     Iron:  No results for input(s): IRONS, FERRITIN in the last 72 hours. Invalid input(s): LABIRONS  Urinalysis: No results for input(s): UA in the last 72 hours.     Objective:  Vitals: BP (!) 190/67   Pulse 110   Temp 99 °F (37.2 °C) (Bladder)   Resp 30   Ht 4' 11\" (1.499 m)   Wt 154 lb 5.2 oz (70 kg)   LMP  (LMP Unknown)   SpO2 100%   BMI 31.17 kg/m²    Wt Readings from Last 3 Encounters:   03/25/21 154 lb 5.2 oz (70 kg)   01/15/21 131 lb (59.4 kg)   11/27/20 120 lb (54.4 kg)      24HR INTAKE/OUTPUT:      Intake/Output Summary (Last 24 hours) at 3/25/2021 1005  Last data filed at 3/25/2021 0800  Gross per 24 hour   Intake 2025.48 ml   Output 2350 ml   Net -324.52 ml     On ventilator  sedated  General: not  alert, in no apparent distress  HEENT: normocephalic, atraumatic, anicteric  Neck: supple, no mass  Lungs: non-labored respirations, clear to auscultation bilaterally  Heart: regular rate and rhythm, no murmurs or rubs  Abdomen: soft, non-tender, non-distended  Ext: no cyanosis, no peripheral edema  Neuro: notalert and oriented, no gross abnormalities  Psych: normal mood and affect  Skin: no rash      Electronically signed by Jen Mccall MD

## 2021-03-25 NOTE — PROGRESS NOTES
Pulmonary & Critical Care Medicine ICU Progress Note  Chief complaint : Post cardiac arrest, acute respiratory failure    Subjunctive/24 hour events :   Patient seen and examined during multidisciplinary rounds with RN, charge nurse, RT, pharmacy, dietitian, and social service. She is on propofol, open her eyes, did not follow commands propofol is discontinued this morning, continues to have fever, 37.9, urine output 1300 cc,+ bowel movement, not on pressors,no significant ET tube discharges. Social History     Tobacco Use    Smoking status: Former Smoker     Packs/day: 1.00     Years: 59.00     Pack years: 59.00     Types: Cigarettes     Start date: 2017    Smokeless tobacco: Never Used    Tobacco comment: quit 2-3 weeks ago    Substance Use Topics    Alcohol use: Not Currently         Problem Relation Age of Onset    Heart Disease Father         cardiac bypass    Arthritis Father     Arthritis Mother     Other Mother          at age 80    Other Sister         [facility]    No Known Problems Daughter     Stroke Son        Recent Labs     21  1603 21  0949   PHART 7.395 7.364   HSY3ODG 38 44   PO2ART 109* 99*       MV Settings:  Vent Mode: AC/VC+ Rate Set: 16 bmp/Vt Ordered: 350 mL/ /FiO2 : 35 %           IV:   dexmedetomidine Stopped (21 1030)    dextrose      propofol Stopped (21 0735)       Vitals:  BP (!) 150/48   Pulse 120   Temp 100 °F (37.8 °C) (Bladder)   Resp 20   Ht 4' 11\" (1.499 m)   Wt 153 lb 14.1 oz (69.8 kg)   LMP  (LMP Unknown)   SpO2 100%   BMI 31.08 kg/m²    Tmax:        Intake/Output Summary (Last 24 hours) at 3/25/2021 0756  Last data filed at 3/25/2021 0000  Gross per 24 hour   Intake 1202.48 ml   Output 1350 ml   Net -147.52 ml       EXAM:  General: Sedated, on vent, no distress  Head: normocephalic, atraumatic  Eyes:No gross abnormalities.   ENT:  MMM no lesions, ET and OG tube in  Neck:  supple and no masses  Chest : Good air movement, no wheezing, no rales, nontender, tympanic  Heart[de-identified] Heart sounds are normal.  Regular rate and rhythm without murmur, gallop or rub. ABD:  symmetric, soft, non-tender, no guarding or rebound  Musculoskeletal : no cyanosis, no clubbing and no edema  Neuro: Awake, follows commands, moves all 4 extremities but weak  Skin: No rashes or nodules noted.   Lymph node:  no cervical nodes  Urology: Yes Sanchez   Psychiatric: Sedated, open her eyes, calm     Medications:  Scheduled Meds:   insulin glargine  20 Units Subcutaneous Nightly    meropenem  500 mg Intravenous Q12H    methylPREDNISolone  40 mg Intravenous Daily    pantoprazole  40 mg Intravenous Daily    And    sodium chloride (PF)  10 mL Intravenous Daily    metoprolol  5 mg Intravenous Q6H    nitroglycerin  0.5 inch Topical 4 times per day    heparin (porcine)  5,000 Units Subcutaneous 3 times per day    docusate  100 mg Oral BID    polyethylene glycol  17 g Oral Daily    budesonide  0.5 mg Nebulization BID    insulin lispro  0-18 Units Subcutaneous Q6H    chlorhexidine  15 mL Mouth/Throat BID    sodium chloride flush  10 mL Intravenous 2 times per day    ipratropium-albuterol  1 ampule Inhalation 4x daily       PRN Meds:  hydrALAZINE **OR** hydrALAZINE, glucose, dextrose, glucagon (rDNA), dextrose, labetalol, labetalol, promethazine **OR** ondansetron, sodium chloride flush, acetaminophen **OR** acetaminophen    Results: reviewed by me   CBC:   Recent Labs     03/23/21  0757 03/23/21  1603 03/24/21  0526 03/24/21  0949   WBC 12.7*  --  19.9*  --    HGB 9.5* 9.2* 8.9* 9.0*   HCT 30.2*  --  28.5*  --    MCV 92.6  --  93.4  --      --  329  --      BMP:   Recent Labs     03/23/21  0648 03/23/21  1603 03/24/21  0526 03/24/21  0949     --  139  --    K 4.4  --  4.2  --    *  --  107  --    CO2 20  --  21  --    PHOS 3.3  --  2.6  --    BUN 36*  --  44*  --    CREATININE 1.81* 2.0* 1.98* 2.1*     LIVER PROFILE:   Recent Labs 03/23/21  0648 03/24/21  0526   AST 41* 28   ALT 87* 68*   BILITOT <0.2 <0.2   ALKPHOS 109 120     PT/INR:   Recent Labs     03/23/21  0649 03/24/21  0526   PROTIME 15.3* 14.6   INR 1.2 1.1     APTT:   Recent Labs     03/23/21  0649 03/24/21  0526   APTT 27.8 38.2*     UA:No results for input(s): NITRITE, COLORU, PHUR, LABCAST, WBCUA, RBCUA, MUCUS, TRICHOMONAS, YEAST, BACTERIA, CLARITYU, SPECGRAV, LEUKOCYTESUR, UROBILINOGEN, BILIRUBINUR, BLOODU, GLUCOSEU, AMORPHOUS in the last 72 hours. Invalid input(s): Leland Napier    Cultures:  Negative so far  Films:  CXR reviewed by me and it showed left lower lobe atelectasis/effusion, slightly congested. Assessment: This is a critically ill patient at risk of deterioration / death , needing close ICU monitoring and intervention due to below noted problems   · Acute hypoxic respiratory failure due to cardiac arrest  · Cardiac arrest, possibly induced by hyperkalemia  · Acute on chronic kidney injury, improving  · Shock physiology resolved  · Diabetes  · High tube feed residuals, likely gastroparesis    Recommendations  · Vent support lung protective strategy  · head of the bed 30°  · Breathing trial today and sedation holiday   · Minimize to no sedation  · Watch for ICU delirium: TV on, natural light, avoid benzos, pain control, early mobility, and family engagement  · PUD prophylaxis  · DVT prophylaxis  · Continue cardioprotective meds  · Continue Lantus increased to 50 units daily  · Monitor blood sugar target 140180  · Monitor urine output  · Continue Solu-Medrol  · Continue meropenem  · Repeat procalcitonin  · Physical therapy    Discussed with patient      Due to the immediate potential for life-threatening deterioration due to acute respiratory failure I spent 36 minutes providing critical care.  This time is excluding time spent performing procedures.           Electronically signed by Radha Sepulveda MD,  [facility] ,on 3/25/2021 at 7:56 AM

## 2021-03-25 NOTE — PROGRESS NOTES
[facility] Occupational Therapy      Date: 3/25/2021  Patient Name: Mickey Mixon        MRN: [id number]  Account: [de-identified]   : 1945  (67 y.o.)  Room: [place] [id number]    Chart reviewed, attempted OT at 1250 for eval. Patient not seen 2° to:    Hold per nursing request due to: Pt. still sedated and unable to participate in therapy at this time. Spoke to Barrett Saldaña RN aware. Will attempt again when able.     Electronically signed by Tex Carrel, OTR/L on 3/25/2021 at 12:59 PM

## 2021-03-25 NOTE — PROGRESS NOTES
Department of Internal Medicine  General Internal Medicine  Attending Progress Note      SUBJECTIVE:  Pt seen and examined. Intubated and sedated. Off pressors. Minimal vent settings.  Pt tolerated SBT, but when cuff deflated, patient still with stridor so patient placed back on ventilator    OBJECTIVE      Medications    Current Facility-Administered Medications: insulin glargine (LANTUS) injection vial 50 Units, 50 Units, Subcutaneous, Nightly  metoprolol (LOPRESSOR) injection 7.5 mg, 7.5 mg, Intravenous, Q4H  nitroglycerin (NITRO-BID) 2 % ointment 1 inch, 1 inch, Topical, 4 times per day  meropenem (MERREM) 500 mg IVPB (mini-bag), 500 mg, Intravenous, Q12H  methylPREDNISolone sodium (SOLU-MEDROL) injection 40 mg, 40 mg, Intravenous, Daily  pantoprazole (PROTONIX) injection 40 mg, 40 mg, Intravenous, Daily **AND** sodium chloride (PF) 0.9 % injection 10 mL, 10 mL, Intravenous, Daily  heparin (porcine) injection 5,000 Units, 5,000 Units, Subcutaneous, 3 times per day  dexmedetomidine (PRECEDEX) 400 mcg in sodium chloride 0.9 % 100 mL infusion, 0.2-1.4 mcg/kg/hr, Intravenous, Continuous  docusate (COLACE) 50 MG/5ML liquid 100 mg, 100 mg, Oral, BID  polyethylene glycol (GLYCOLAX) packet 17 g, 17 g, Oral, Daily  budesonide (PULMICORT) nebulizer suspension 500 mcg, 0.5 mg, Nebulization, BID  insulin lispro (HUMALOG) injection vial 0-18 Units, 0-18 Units, Subcutaneous, Q6H  hydrALAZINE (APRESOLINE) injection 10 mg, 10 mg, Intravenous, Q4H PRN **OR** hydrALAZINE (APRESOLINE) injection 20 mg, 20 mg, Intravenous, Q4H PRN  glucose (GLUTOSE) 40 % oral gel 15 g, 15 g, Oral, PRN  dextrose 50 % IV solution, 12.5 g, Intravenous, PRN  glucagon (rDNA) injection 1 mg, 1 mg, Intramuscular, PRN  dextrose 5 % solution, 100 mL/hr, Intravenous, PRN  labetalol (NORMODYNE;TRANDATE) injection 20 mg, 20 mg, Intravenous, Q4H PRN  labetalol (NORMODYNE;TRANDATE) injection 40 mg, 40 mg, Intravenous, Q4H PRN  propofol injection, 5-50 mcg/kg/min, Intravenous, Titrated  promethazine (PHENERGAN) tablet 12.5 mg, 12.5 mg, Oral, Q6H PRN **OR** ondansetron (ZOFRAN) injection 4 mg, 4 mg, Intravenous, Q6H PRN  chlorhexidine (PERIDEX) 0.12 % solution 15 mL, 15 mL, Mouth/Throat, BID  sodium chloride flush 0.9 % injection 10 mL, 10 mL, Intravenous, 2 times per day  sodium chloride flush 0.9 % injection 10 mL, 10 mL, Intravenous, PRN  acetaminophen (TYLENOL) tablet 650 mg, 650 mg, Oral, Q6H PRN **OR** acetaminophen (TYLENOL) suppository 650 mg, 650 mg, Rectal, Q6H PRN  ipratropium-albuterol (DUONEB) nebulizer solution 1 ampule, 1 ampule, Inhalation, 4x daily  Physical    VITALS:  BP (!) 190/67   Pulse 110   Temp 99 °F (37.2 °C) (Bladder)   Resp 30   Ht 4' 11\" (1.499 m)   Wt 154 lb 5.2 oz (70 kg)   LMP  (LMP Unknown)   SpO2 100%   BMI 31.17 kg/m²   Constitutional: intubated and sedated  Head: Normocephalic, atraumatic  ENT: moist mucous membranes, ETT in place  Neck: neck supple, trachea midline  Lungs: Good inspiratory effort, bilateral rhonchi and crackles  Heart: RRR, normal S1 and S2  GI: Soft, non-distended, +BS  MSK: no edema noted  Skin: warm, dry     Data    CBC:   Lab Results   Component Value Date    WBC 17.9 03/25/2021    RBC 3.15 03/25/2021    HGB 9.2 03/25/2021    HCT 29.0 03/25/2021    MCV 92.1 03/25/2021    MCH 29.2 03/25/2021    MCHC 31.7 03/25/2021    RDW 17.3 03/25/2021     03/25/2021    MPV 8.8 08/01/2015     CMP:    Lab Results   Component Value Date     03/25/2021     03/25/2021    K 4.6 03/25/2021    K 4.6 03/25/2021    K 4.4 10/19/2020     03/25/2021     03/25/2021    CO2 22 03/25/2021    CO2 22 03/25/2021    BUN 52 03/25/2021    BUN 52 03/25/2021    CREATININE 1.63 03/25/2021    CREATININE 1.72 03/25/2021    GFRAA 37.1 03/25/2021    GFRAA 34.8 03/25/2021    LABGLOM 30.6 03/25/2021    LABGLOM 28.8 03/25/2021    GLUCOSE 374 03/25/2021    GLUCOSE 370 03/25/2021    PROT 6.8 03/25/2021    LABALBU 3.1 03/25/2021 LABALBU 3.2 03/25/2021    CALCIUM 10.0 03/25/2021    CALCIUM 10.0 03/25/2021    BILITOT <0.2 03/25/2021    ALKPHOS 123 03/25/2021    AST 15 03/25/2021    ALT 46 03/25/2021       ASSESSMENT AND PLAN      # Cardiac arrest s/p ROSC   - initially required Dopamine, norepinephrine and epinephrine infusion  - off pressors and hypothermia protocol  - TTE showed LVEF of 50%  - cardiology following     # Acute hypoxic respiratory failure  - requiring intubation and mechanical ventilation  - extubated 3/22, but had to be re-intubated same day due to stridor and respiratory distress. Continues to have stridor when cuff deflated  - management per critical care service     # Acute encephalopathy  - was following commands and awake and alert during SBT   - likely hypoxic-ischemic etiology from cardiac arrest  - CT head was negative per report  - neurology following     # Acute / chronic anemia - improved  - with Hb of 5.8 on arrival, FOBT was positive  - improved s/p PRBCs given in ED  - on Protonix   - follow H/H      # Lactic acidosis  - in the setting of cardiac arrest  - improved with volume repletion     # Hyperkalemia  - resolved     # SAÚL/CKD3  - nephrology following     # IDDM2 with hyperglycemia  - improved, off insulin drip, on ISS     # Leukocytosis   - improved     Code status - DNRCCA    Disposition: Remains intubated and sedated. Will continue steroids, abx. Pulm consulted.        [facility] DO  Internal Medicine

## 2021-03-25 NOTE — PROGRESS NOTES
Assessments completed (see flowsheets). Pt remained stable throughout shift. Pt tolerated T/F and IV drip infusion. Bedside report given to on coming RN. Safety measures in place.

## 2021-03-25 NOTE — PROGRESS NOTES
Progress Note  Patient: Chyna Coronado  Unit/Bed: IC05/IC05-01  YOB: 1945  MRN: [id number]  Acct: [de-identified]   Admitting Diagnosis: Cardiac arrest [facility]) [I46.9]  Admit Date:  3/18/2021  Hospital Day: 7    Chief Complaint: CPA    Histories:  Past Medical History:   Diagnosis Date    Anxiety     Asthma     dx 2019 / has smoked since age 14    CH[facility]congestive heart failure) ([facility])     Chronic back pain     Bilateral L5 S1 Radic on emg--surprisingly worse on the left than the right--pt's symptoms and her MRI show worse on the right    Chronic obstructive pulmonary disease with acute exacerbation ([facility] 10/12/2019    Depression     Fibromyalgia     Hyperlipidemia     meds > 8 yrs    Hypertension     meds > 45 yrs    On home O2     2l per n/c at bedtime mostly,     Osteoarthritis     Type II diabetes mellitus, uncontrolled ([facility]     hx > 8 yrs    Unspecified sleep apnea      Past Surgical History:   Procedure Laterality Date    BACK SURGERY  2017    lumbar disc    CARDIAC CATHETERIZATION  11/3/14     DR. MIRELES / no stents    COLONOSCOPY  08/29/2016    w/polypectomy     COLONOSCOPY N/A 9/29/2020    COLONOSCOPY WITH POLYPECTOMY performed by Cielo Loaiza MD at [facility] N/A 10/7/2019    EUA HYSTEROSCOPY DILATATION AND CURETTAGE performed by Simba Phillips DO at [facility]. [address], COLON, DIAGNOSTIC      EYE SURGERY      [place] with IOL OU / [address] [place]  [id number]    umbilical hernia repair    [place] ESOPHAGOGASTRODUODENOSCOPY TRANSORAL DIAGNOSTIC N/A 3/24/2017    EGD ESOPHAGOGASTRODUODENOSCOPY performed by Lashonda Edwards MD at [facility] 2/8/2018    negative findings    OH REVISE MEDIAN N/CARPAL TUNNEL SURG Left 6/5/2017    LEFT  CARPAL TUNNEL RELEASE performed by Adrien Boateng MD at [facility],3+X/I,[id number] N/A 2/8/2018    TONSILLECTOMY      as child  UPPER GASTROINTESTINAL ENDOSCOPY  2016    w/bx     UPPER GASTROINTESTINAL ENDOSCOPY N/A 2020    EGD possible biopsy performed by Jian Harris MD at [address] N/A 2020    EGD PUSH ENTEROSCOPY performed by Tara Short MD at [facility]     Family History   Problem Relation Age of Onset    Heart Disease Father         cardiac bypass    Arthritis Father     Arthritis Mother     Other Mother          at age 80    Other [facility]         [facility]    No Known Problems Daughter     Stroke Son      Social History     Socioeconomic History    Marital status:      Spouse name: Holly Narvaez Number of children: 2    Years of education: 15    Highest education level: High school graduate   Occupational History    Occupation: Retired-   Social Needs    Financial resource strain: Not hard at all   [employer] insecurity     Worry: Never true     Inability: Never true    Transportation needs     Medical: No     Non-medical: No   Tobacco Use    Smoking status: Former Smoker     Packs/day: 1.00     Years: 59.00     Pack years: 59.00     Types: Cigarettes     Start date: 2017    Smokeless tobacco: Never Used    Tobacco comment: quit 2-3 weeks ago    Substance and Sexual Activity    Alcohol use: Not Currently    Drug use: No    Sexual activity: Yes     Partners: Male   Lifestyle    Physical activity     Days per week: 0 days     Minutes per session: 0 min    Stress: Only a little   Relationships    Social connections     Talks on phone: More than three times a week     Gets together: More than three times a week     Attends [employer] service: 1 to 4 times per year     Active member of club or organization: No     Attends meetings of clubs or organizations: Never     Relationship status: Living with partner   [place] Intimate partner violence     Fear of current or ex partner: No     Emotionally abused:  No Physically abused: No     Forced sexual activity: No   Other Topics Concern    None   Social History Narrative    Grew up in [place]     Lives With:  [id number] Spouse    Type of Home: House    Home Layout: Two level, Performs ADL's on one level    Home Access: Stairs to enter with rails    Entrance Stairs - Number of Steps: 4    Bathroom Shower/Tub: Tub/Shower unit, Doors    Bathroom Equipment: Shower chair, Grab bars in [address]: Rolling walker, Cane, Oxygen(uses her O2 very seldom)    ADL Assistance: Needs assistance    Homemaking Assistance: Needs assistance    Homemaking Responsibilities: No(spouse performs)    Ambulation Assistance: Independent    Transfer Assistance: Independent    Active : No    Occupation: Retired    Type of occupation: worked in [employer]: patient enjoys televsision       Subjective/HPI remains vented 35% FIO2. No Acute events. Minimally responsive.  in room> HTN overnight    EKG: ST 82        Review of Systems:   Review of Systems  vented    Physical Examination:    BP (!) 190/67   Pulse 110   Temp 99 °F (37.2 °C) (Bladder)   Resp 30   Ht 4' 11\" (1.499 m)   Wt 154 lb 5.2 oz (70 kg)   LMP  (LMP Unknown)   SpO2 100%   BMI 31.17 kg/m²    Physical Exam   Constitutional: She appears healthy. No distress. HENT:   Normal cephalic and Atraumatic   Eyes: Pupils are equal, round, and reactive to light. Neck: Normal range of motion and thyroid normal. Neck supple. No JVD present. No neck adenopathy. No thyromegaly present. Cardiovascular: Normal rate, regular rhythm, intact distal pulses and normal pulses. Murmur heard. Pulmonary/Chest: Effort normal and breath sounds normal. She has no wheezes. She has no rales. She exhibits no tenderness. Abdominal: Soft. Bowel sounds are normal. There is no abdominal tenderness. Musculoskeletal: Normal range of motion. General: No tenderness or edema.    Neurological: She is alert and oriented to person, place, and time. Skin: Skin is warm. No cyanosis. Nails show no clubbing.        LABS:  CBC:   Lab Results   Component Value Date    WBC 19.9 03/24/2021    RBC 3.06 03/24/2021    HGB 9.0 03/24/2021    HCT 28.5 03/24/2021    MCV 93.4 03/24/2021    MCH 29.2 03/24/2021    MCHC 31.3 03/24/2021    RDW 16.7 03/24/2021     03/24/2021    MPV 8.8 08/01/2015     CBC with Differential:    Lab Results   Component Value Date    WBC 19.9 03/24/2021    RBC 3.06 03/24/2021    HGB 9.0 03/24/2021    HCT 28.5 03/24/2021     03/24/2021    MCV 93.4 03/24/2021    MCH 29.2 03/24/2021    MCHC 31.3 03/24/2021    RDW 16.7 03/24/2021    NRBC 1 08/28/2020    LYMPHOPCT 5.0 03/24/2021    MONOPCT 9.5 03/24/2021    BASOPCT 0.3 03/24/2021    MONOSABS 2.0 03/24/2021    LYMPHSABS 1.0 03/24/2021    EOSABS 0.0 03/24/2021    BASOSABS 0.0 03/24/2021     CMP:    Lab Results   Component Value Date     03/24/2021    K 4.2 03/24/2021    K 4.4 10/19/2020     03/24/2021    CO2 21 03/24/2021    BUN 44 03/24/2021    CREATININE 2.1 03/24/2021    CREATININE 1.98 03/24/2021    GFRAA 28 03/24/2021    LABGLOM 23 03/24/2021    GLUCOSE 214 03/24/2021    PROT 6.6 03/24/2021    LABALBU 2.9 03/24/2021    CALCIUM 9.5 03/24/2021    BILITOT <0.2 03/24/2021    ALKPHOS 120 03/24/2021    AST 28 03/24/2021    ALT 68 03/24/2021     BMP:    Lab Results   Component Value Date     03/24/2021    K 4.2 03/24/2021    K 4.4 10/19/2020     03/24/2021    CO2 21 03/24/2021    BUN 44 03/24/2021    LABALBU 2.9 03/24/2021    CREATININE 2.1 03/24/2021    CREATININE 1.98 03/24/2021    CALCIUM 9.5 03/24/2021    GFRAA 28 03/24/2021    LABGLOM 23 03/24/2021    GLUCOSE 214 03/24/2021     Magnesium:    Lab Results   Component Value Date    MG 3.0 03/24/2021     Troponin:    Lab Results   Component Value Date    TROPONINI 0.056 03/19/2021        Active Hospital Problems    Diagnosis Date Noted    Acute respiratory failure with hypoxia ([facility] [J96.01]      Priority: Low    Cardiac arrest ([facility] [I46.9] 03/18/2021     Priority: Low        Assessment/Plan:  1. CPA   2. Respiratory failure- remains vented. 3. CAD - LAD mild  4. LVEF 55%  5. Echo Severe LVH. 1-2+ MR RVSP 31 mmHg. LVEF 50%  6. History of HCM - need to Keep HR close to 60s. continue iv BB. And  Nitrates - post extubation will adjust CV meds. 7. SAÚL - Nephrology following- stable   8.  Accelerated HTN- advance iv BB to 7.5 q4 and advance Nitropaste to 1 inch        Electronically signed by Maci Kwon MD on 3/25/2021 at 10:37 AM

## 2021-03-26 LAB
ABO/RH: NORMAL
ALBUMIN SERPL-MCNC: 2.6 G/DL (ref 3.5–4.6)
ALP BLD-CCNC: 98 U/L (ref 40–130)
ALT SERPL-CCNC: 31 U/L (ref 0–33)
ANION GAP SERPL CALCULATED.3IONS-SCNC: 11 MEQ/L (ref 9–15)
ANTIBODY SCREEN: NORMAL
APTT: 60.3 SEC (ref 24.4–36.8)
AST SERPL-CCNC: 11 U/L (ref 0–35)
BASOPHILS ABSOLUTE: 0.1 K/UL (ref 0–0.2)
BASOPHILS RELATIVE PERCENT: 0.4 %
BILIRUB SERPL-MCNC: <0.2 MG/DL (ref 0.2–0.7)
BLOOD BANK DISPENSE STATUS: NORMAL
BLOOD BANK PRODUCT CODE: NORMAL
BPU ID: NORMAL
BUN BLDV-MCNC: 68 MG/DL (ref 8–23)
CALCIUM IONIZED, CALC AT PH 7.4: 1.32 MMOL/L (ref 1.11–1.3)
CALCIUM IONIZED: 1.33 MMOL/L (ref 1.11–1.3)
CALCIUM SERPL-MCNC: 9 MG/DL (ref 8.5–9.9)
CHLORIDE BLD-SCNC: 111 MEQ/L (ref 95–107)
CO2: 23 MEQ/L (ref 20–31)
CREAT SERPL-MCNC: 1.75 MG/DL (ref 0.5–0.9)
CULTURE, RESPIRATORY: NORMAL
DESCRIPTION BLOOD BANK: NORMAL
EOSINOPHILS ABSOLUTE: 0 K/UL (ref 0–0.7)
EOSINOPHILS RELATIVE PERCENT: 0.2 %
GFR AFRICAN AMERICAN: 34.1
GFR NON-AFRICAN AMERICAN: 28.2
GLOBULIN: 3.1 G/DL (ref 2.3–3.5)
GLUCOSE BLD-MCNC: 105 MG/DL (ref 60–115)
GLUCOSE BLD-MCNC: 107 MG/DL (ref 60–115)
GLUCOSE BLD-MCNC: 107 MG/DL (ref 60–115)
GLUCOSE BLD-MCNC: 111 MG/DL (ref 60–115)
GLUCOSE BLD-MCNC: 113 MG/DL (ref 60–115)
GLUCOSE BLD-MCNC: 115 MG/DL (ref 60–115)
GLUCOSE BLD-MCNC: 118 MG/DL (ref 60–115)
GLUCOSE BLD-MCNC: 148 MG/DL (ref 60–115)
GLUCOSE BLD-MCNC: 223 MG/DL (ref 60–115)
GLUCOSE BLD-MCNC: 273 MG/DL (ref 60–115)
GLUCOSE BLD-MCNC: 295 MG/DL (ref 70–99)
GLUCOSE BLD-MCNC: 360 MG/DL (ref 60–115)
GLUCOSE BLD-MCNC: 96 MG/DL (ref 60–115)
GLUCOSE BLD-MCNC: 96 MG/DL (ref 60–115)
GRAM STAIN RESULT: NORMAL
HCT VFR BLD CALC: 24 % (ref 37–47)
HEMOGLOBIN: 7.7 G/DL (ref 12–16)
INR BLD: 1.1
LYMPHOCYTES ABSOLUTE: 0.8 K/UL (ref 1–4.8)
LYMPHOCYTES RELATIVE PERCENT: 5.8 %
MAGNESIUM: 2.9 MG/DL (ref 1.7–2.4)
MCH RBC QN AUTO: 29.4 PG (ref 27–31.3)
MCHC RBC AUTO-ENTMCNC: 31.9 % (ref 33–37)
MCV RBC AUTO: 92 FL (ref 82–100)
MONOCYTES ABSOLUTE: 1.3 K/UL (ref 0.2–0.8)
MONOCYTES RELATIVE PERCENT: 9.2 %
NEUTROPHILS ABSOLUTE: 11.9 K/UL (ref 1.4–6.5)
NEUTROPHILS RELATIVE PERCENT: 84.4 %
ORGANISM: ABNORMAL
PDW BLD-RTO: 17.5 % (ref 11.5–14.5)
PERFORMED ON: ABNORMAL
PERFORMED ON: NORMAL
PHOSPHORUS: 2.7 MG/DL (ref 2.3–4.8)
PLATELET # BLD: 331 K/UL (ref 130–400)
POTASSIUM SERPL-SCNC: 4.2 MEQ/L (ref 3.4–4.9)
PROTHROMBIN TIME: 14.3 SEC (ref 12.3–14.9)
RBC # BLD: 2.61 M/UL (ref 4.2–5.4)
REASON FOR REJECTION: NORMAL
REJECTED TEST: NORMAL
SODIUM BLD-SCNC: 145 MEQ/L (ref 135–144)
TOTAL PROTEIN: 5.7 G/DL (ref 6.3–8)
URINE CULTURE, ROUTINE: ABNORMAL
WBC # BLD: 14.1 K/UL (ref 4.8–10.8)

## 2021-03-26 PROCEDURE — 6370000000 HC RX 637 (ALT 250 FOR IP): Performed by: INTERNAL MEDICINE

## 2021-03-26 PROCEDURE — 84100 ASSAY OF PHOSPHORUS: CPT

## 2021-03-26 PROCEDURE — 2580000003 HC RX 258: Performed by: INTERNAL MEDICINE

## 2021-03-26 PROCEDURE — 85025 COMPLETE CBC W/AUTO DIFF WBC: CPT

## 2021-03-26 PROCEDURE — 86901 BLOOD TYPING SEROLOGIC RH(D): CPT

## 2021-03-26 PROCEDURE — 94640 AIRWAY INHALATION TREATMENT: CPT

## 2021-03-26 PROCEDURE — 6360000002 HC RX W HCPCS: Performed by: INTERNAL MEDICINE

## 2021-03-26 PROCEDURE — 86900 BLOOD TYPING SEROLOGIC ABO: CPT

## 2021-03-26 PROCEDURE — 99233 SBSQ HOSP IP/OBS HIGH 50: CPT | Performed by: INTERNAL MEDICINE

## 2021-03-26 PROCEDURE — 86850 RBC ANTIBODY SCREEN: CPT

## 2021-03-26 PROCEDURE — 94003 VENT MGMT INPAT SUBQ DAY: CPT

## 2021-03-26 PROCEDURE — 31500 INSERT EMERGENCY AIRWAY: CPT

## 2021-03-26 PROCEDURE — 2000000000 HC ICU R&B

## 2021-03-26 PROCEDURE — 36415 COLL VENOUS BLD VENIPUNCTURE: CPT

## 2021-03-26 PROCEDURE — 99291 CRITICAL CARE FIRST HOUR: CPT | Performed by: INTERNAL MEDICINE

## 2021-03-26 PROCEDURE — C9113 INJ PANTOPRAZOLE SODIUM, VIA: HCPCS | Performed by: INTERNAL MEDICINE

## 2021-03-26 PROCEDURE — 94761 N-INVAS EAR/PLS OXIMETRY MLT: CPT

## 2021-03-26 PROCEDURE — 80053 COMPREHEN METABOLIC PANEL: CPT

## 2021-03-26 PROCEDURE — P9016 RBC LEUKOCYTES REDUCED: HCPCS

## 2021-03-26 PROCEDURE — 2700000000 HC OXYGEN THERAPY PER DAY

## 2021-03-26 PROCEDURE — 99223 1ST HOSP IP/OBS HIGH 75: CPT | Performed by: INTERNAL MEDICINE

## 2021-03-26 PROCEDURE — 94660 CPAP INITIATION&MGMT: CPT

## 2021-03-26 PROCEDURE — 83735 ASSAY OF MAGNESIUM: CPT

## 2021-03-26 PROCEDURE — 85730 THROMBOPLASTIN TIME PARTIAL: CPT

## 2021-03-26 PROCEDURE — 85610 PROTHROMBIN TIME: CPT

## 2021-03-26 PROCEDURE — 36430 TRANSFUSION BLD/BLD COMPNT: CPT

## 2021-03-26 PROCEDURE — 94760 N-INVAS EAR/PLS OXIMETRY 1: CPT

## 2021-03-26 PROCEDURE — 82330 ASSAY OF CALCIUM: CPT

## 2021-03-26 PROCEDURE — 86923 COMPATIBILITY TEST ELECTRIC: CPT

## 2021-03-26 PROCEDURE — 2500000003 HC RX 250 WO HCPCS: Performed by: INTERNAL MEDICINE

## 2021-03-26 RX ORDER — DEXTROSE MONOHYDRATE 50 MG/ML
100 INJECTION, SOLUTION INTRAVENOUS PRN
Status: DISCONTINUED | OUTPATIENT
Start: 2021-03-26 | End: 2021-04-02 | Stop reason: HOSPADM

## 2021-03-26 RX ORDER — LIDOCAINE HYDROCHLORIDE 20 MG/ML
5 INJECTION, SOLUTION INFILTRATION; PERINEURAL ONCE
Status: COMPLETED | OUTPATIENT
Start: 2021-03-26 | End: 2021-03-29

## 2021-03-26 RX ORDER — NICOTINE POLACRILEX 4 MG
15 LOZENGE BUCCAL PRN
Status: DISCONTINUED | OUTPATIENT
Start: 2021-03-26 | End: 2021-04-02 | Stop reason: HOSPADM

## 2021-03-26 RX ORDER — CLONIDINE HYDROCHLORIDE 0.1 MG/1
0.2 TABLET ORAL 2 TIMES DAILY
Status: DISCONTINUED | OUTPATIENT
Start: 2021-03-26 | End: 2021-04-02 | Stop reason: HOSPADM

## 2021-03-26 RX ORDER — SODIUM CHLORIDE 0.9 % (FLUSH) 0.9 %
10 SYRINGE (ML) INJECTION PRN
Status: DISCONTINUED | OUTPATIENT
Start: 2021-03-26 | End: 2021-04-02 | Stop reason: HOSPADM

## 2021-03-26 RX ORDER — SODIUM CHLORIDE 9 MG/ML
250 INJECTION, SOLUTION INTRAVENOUS ONCE
Status: COMPLETED | OUTPATIENT
Start: 2021-03-26 | End: 2021-03-29

## 2021-03-26 RX ORDER — CARVEDILOL 25 MG/1
25 TABLET ORAL 2 TIMES DAILY
Status: DISCONTINUED | OUTPATIENT
Start: 2021-03-26 | End: 2021-03-30

## 2021-03-26 RX ORDER — SODIUM CHLORIDE 0.9 % (FLUSH) 0.9 %
10 SYRINGE (ML) INJECTION EVERY 12 HOURS SCHEDULED
Status: DISCONTINUED | OUTPATIENT
Start: 2021-03-26 | End: 2021-04-02 | Stop reason: HOSPADM

## 2021-03-26 RX ORDER — SODIUM CHLORIDE 9 MG/ML
INJECTION, SOLUTION INTRAVENOUS PRN
Status: DISCONTINUED | OUTPATIENT
Start: 2021-03-26 | End: 2021-04-02 | Stop reason: HOSPADM

## 2021-03-26 RX ORDER — DEXTROSE MONOHYDRATE 25 G/50ML
12.5 INJECTION, SOLUTION INTRAVENOUS PRN
Status: DISCONTINUED | OUTPATIENT
Start: 2021-03-26 | End: 2021-04-02 | Stop reason: HOSPADM

## 2021-03-26 RX ADMIN — Medication 10 ML: at 20:13

## 2021-03-26 RX ADMIN — METOPROLOL TARTRATE 7.5 MG: 5 INJECTION INTRAVENOUS at 04:47

## 2021-03-26 RX ADMIN — BUDESONIDE 500 MCG: 0.5 SUSPENSION RESPIRATORY (INHALATION) at 04:18

## 2021-03-26 RX ADMIN — POLYETHYLENE GLYCOL 3350 17 G: 17 POWDER, FOR SOLUTION ORAL at 08:07

## 2021-03-26 RX ADMIN — NITROGLYCERIN 1 INCH: 20 OINTMENT TOPICAL at 05:42

## 2021-03-26 RX ADMIN — NITROGLYCERIN 1 INCH: 20 OINTMENT TOPICAL at 10:49

## 2021-03-26 RX ADMIN — CHLORHEXIDINE GLUCONATE 15 ML: 1.2 RINSE ORAL at 08:07

## 2021-03-26 RX ADMIN — SODIUM CHLORIDE, PRESERVATIVE FREE 10 ML: 5 INJECTION INTRAVENOUS at 20:13

## 2021-03-26 RX ADMIN — METOPROLOL TARTRATE 7.5 MG: 5 INJECTION INTRAVENOUS at 08:07

## 2021-03-26 RX ADMIN — DOCUSATE SODIUM 100 MG: 50 LIQUID ORAL at 08:07

## 2021-03-26 RX ADMIN — HEPARIN SODIUM 5000 UNITS: 5000 INJECTION INTRAVENOUS; SUBCUTANEOUS at 05:42

## 2021-03-26 RX ADMIN — CHLORHEXIDINE GLUCONATE 15 ML: 1.2 RINSE ORAL at 20:12

## 2021-03-26 RX ADMIN — IPRATROPIUM BROMIDE AND ALBUTEROL SULFATE 1 AMPULE: .5; 3 SOLUTION RESPIRATORY (INHALATION) at 20:30

## 2021-03-26 RX ADMIN — HYDRALAZINE HYDROCHLORIDE 10 MG: 20 INJECTION INTRAMUSCULAR; INTRAVENOUS at 23:21

## 2021-03-26 RX ADMIN — PANTOPRAZOLE SODIUM 40 MG: 40 INJECTION, POWDER, FOR SOLUTION INTRAVENOUS at 08:07

## 2021-03-26 RX ADMIN — SODIUM CHLORIDE 6.39 UNITS/HR: 9 INJECTION, SOLUTION INTRAVENOUS at 10:47

## 2021-03-26 RX ADMIN — METHYLPREDNISOLONE SODIUM SUCCINATE 40 MG: 40 INJECTION, POWDER, FOR SOLUTION INTRAMUSCULAR; INTRAVENOUS at 08:08

## 2021-03-26 RX ADMIN — MEROPENEM 1000 MG: 1 INJECTION, POWDER, FOR SOLUTION INTRAVENOUS at 20:14

## 2021-03-26 RX ADMIN — Medication 10 ML: at 08:08

## 2021-03-26 RX ADMIN — IPRATROPIUM BROMIDE AND ALBUTEROL SULFATE 1 AMPULE: .5; 3 SOLUTION RESPIRATORY (INHALATION) at 09:58

## 2021-03-26 RX ADMIN — IPRATROPIUM BROMIDE AND ALBUTEROL SULFATE 1 AMPULE: .5; 3 SOLUTION RESPIRATORY (INHALATION) at 15:59

## 2021-03-26 RX ADMIN — PROPOFOL 40 MCG/KG/MIN: 10 INJECTION, EMULSION INTRAVENOUS at 01:15

## 2021-03-26 RX ADMIN — METOPROLOL TARTRATE 7.5 MG: 5 INJECTION INTRAVENOUS at 00:04

## 2021-03-26 RX ADMIN — BUDESONIDE 500 MCG: 0.5 SUSPENSION RESPIRATORY (INHALATION) at 16:01

## 2021-03-26 RX ADMIN — CLONIDINE HYDROCHLORIDE 0.2 MG: 0.1 TABLET ORAL at 10:49

## 2021-03-26 RX ADMIN — NITROGLYCERIN 1 INCH: 20 OINTMENT TOPICAL at 23:21

## 2021-03-26 RX ADMIN — CARVEDILOL 25 MG: 25 TABLET, FILM COATED ORAL at 10:49

## 2021-03-26 RX ADMIN — NITROGLYCERIN 1 INCH: 20 OINTMENT TOPICAL at 18:14

## 2021-03-26 RX ADMIN — HEPARIN SODIUM 5000 UNITS: 5000 INJECTION INTRAVENOUS; SUBCUTANEOUS at 22:11

## 2021-03-26 RX ADMIN — SODIUM CHLORIDE, PRESERVATIVE FREE 10 ML: 5 INJECTION INTRAVENOUS at 08:08

## 2021-03-26 RX ADMIN — IPRATROPIUM BROMIDE AND ALBUTEROL SULFATE 1 AMPULE: .5; 3 SOLUTION RESPIRATORY (INHALATION) at 04:18

## 2021-03-26 RX ADMIN — PROPOFOL 35 MCG/KG/MIN: 10 INJECTION, EMULSION INTRAVENOUS at 06:11

## 2021-03-26 RX ADMIN — MEROPENEM 500 MG: 500 INJECTION, POWDER, FOR SOLUTION INTRAVENOUS at 08:09

## 2021-03-26 ASSESSMENT — PAIN SCALES - GENERAL
PAINLEVEL_OUTOF10: 0
PAINLEVEL_OUTOF10: 2
PAINLEVEL_OUTOF10: 0

## 2021-03-26 ASSESSMENT — PULMONARY FUNCTION TESTS
PIF_VALUE: 13
PIF_VALUE: 9
PIF_VALUE: 9
PIF_VALUE: 14
PIF_VALUE: 18
PIF_VALUE: 9
PIF_VALUE: 19
PIF_VALUE: 13
PIF_VALUE: 14
PIF_VALUE: 9
PIF_VALUE: 17
PIF_VALUE: 12
PIF_VALUE: 10

## 2021-03-26 NOTE — CONSULTS
Infectious Diseases Inpatient Consult Note      Reason for Consult:   ESBL UTI  Requesting Physician:   Dr Sydney Landa  Primary Care Physician:  Jimmy Richey MD  History Obtained From:   Pt, EPIC    Admit Date: 3/18/2021  Hospital Day: 9      HISTORY OF PRESENT ILLNESS:  This is a 67 y.o. female was admitted to [facility]  from home  through ER on 03/18 with cardiac arrest, successful resuscitation status post Arctic sun and extubation. Remains to be in ICU, currently on BiPAP. Was started on IV meropenem. Urine culture collected on 323 was positive for ESBL E. Coli. No urinalysis done at that point. Patient had a Sanchez catheter placed on admission. Low-grade fevers. Persistent leukocytosis. Persistent altered mentation and obtundation. Does not follow commands. Was on dopamine and Levophed on admission. Currently weaned off vasopressors. Remains to be on insulin drip for persistent hyperglycemia. Past medical history, surgical history and social history are as detailed below    Past Medical History:   Diagnosis Date    Anxiety     Asthma     dx 2019 / has smoked since age 14    CHF (congestive heart failure) (HCC)     Chronic back pain     Bilateral L5 S1 Radic on emg--surprisingly worse on the left than the right--pt's symptoms and her MRI show worse on the right    Chronic obstructive pulmonary disease with acute exacerbation ([address] 10/12/2019    Depression     Fibromyalgia     Hyperlipidemia     meds > 8 yrs    Hypertension     meds > 45 yrs    On home O2     2l per n/c at bedtime mostly,     Osteoarthritis     Type II diabetes mellitus, uncontrolled ([address]     hx > 8 yrs    Unspecified sleep apnea        Past Surgical History:   Procedure Laterality Date    BACK SURGERY  2017    lumbar disc    CARDIAC CATHETERIZATION  11/3/14     DR. MIRELES / no stents    COLONOSCOPY  08/29/2016    w/polypectomy     COLONOSCOPY N/A 9/29/2020    COLONOSCOPY WITH POLYPECTOMY performed by Altagracia Valentino Bryan Serrano MD at [facility] N/A 10/7/2019    EUA HYSTEROSCOPY DILATATION AND CURETTAGE performed by Delmy Anderson DO at [address] [place] [address] ENDOSCOPY, COLON, DIAGNOSTIC      EYE SURGERY      [place] with IOL OU / [id number]    umbilical hernia repair    [place] ESOPHAGOGASTRODUODENOSCOPY TRANSORAL DIAGNOSTIC N/A 3/24/2017    EGD ESOPHAGOGASTRODUODENOSCOPY performed by Susannah Holman MD at [facility] N/A 2/8/2018    negative findings    [place] REVISE MEDIAN N/CARPAL TUNNEL SURG Left 6/5/2017    LEFT  CARPAL TUNNEL RELEASE performed by Karla Remy [facility] at [facility],8[facility]C,[facility] N/A 2/8/2018    TONSILLECTOMY      as child    UPPER GASTROINTESTINAL ENDOSCOPY  08/26/2016    w/bx     UPPER GASTROINTESTINAL ENDOSCOPY N/A 2/25/2020    EGD possible biopsy performed by Juancarlos Rios MD at [address] [place] [address] 7/1/2020    EGD PUSH ENTEROSCOPY performed by Hermann Gallardo MD at [facility]       Current Medications:     lidocaine  5 mL Intradermal Once    sodium chloride flush  10 mL Intravenous 2 times per day    carvedilol  25 mg Oral BID    cloNIDine  0.2 mg Oral BID    meropenem  1,000 mg Intravenous Q12H    nitroglycerin  1 inch Topical 4 times per day    methylPREDNISolone  40 mg Intravenous Daily    pantoprazole  40 mg Intravenous Daily    And    sodium chloride (PF)  10 mL Intravenous Daily    heparin (porcine)  5,000 Units Subcutaneous 3 times per day    docusate  100 mg Oral BID    polyethylene glycol  17 g Oral Daily    budesonide  0.5 mg Nebulization BID    chlorhexidine  15 mL Mouth/Throat BID    sodium chloride flush  10 mL Intravenous 2 times per day    ipratropium-albuterol  1 ampule Inhalation 4x daily       Allergies:  Ibuprofen, Metformin and related, and Darvon [propoxyphene hcl]    Social History     Socioeconomic History    Marital status:      Spouse name: Regan Bond Number of children: 2    Years of education: 15    Highest education level: High school graduate   Occupational History    Occupation: Retired-   Social Needs    Financial resource strain: Not hard at all   [place] insecurity     Worry: Never true     Inability: Never true   [employer] needs     Medical: No     Non-medical: No   Tobacco Use    Smoking status: Former Smoker     Packs/day: 1.00     Years: 59.00     Pack years: 59.00     Types: Cigarettes     Start date: 11/30/2017    Smokeless tobacco: Never Used    Tobacco comment: quit 2-3 weeks ago    Substance and Sexual Activity    Alcohol use: Not Currently    Drug use: No    Sexual activity: Yes     Partners: Male   Lifestyle    Physical activity     Days per week: 0 days     Minutes per session: 0 min    Stress:  Only a little   Relationships    Social connections     Talks on phone: More than three times a week     Gets together: More than three times a week     Attends Christianity service: 1 to 4 times per year     Active member of club or organization: No     Attends meetings of clubs or organizations: Never     Relationship status: Living with partner    Intimate partner violence     Fear of current or ex partner: No     Emotionally abused: No     Physically abused: No     Forced sexual activity: No   Other Topics Concern    Not on file   Social History Narrative    Grew up in [place]     Lives With:  Diane Rae Spouse    Type of Home: House    Home Layout: Two level, Performs ADL's on one level    Home Access: Stairs to enter with rails    Entrance Stairs - Number of Steps: 4    Bathroom Shower/Tub: Tub/Shower unit, Doors    Bathroom Equipment: Shower chair, Grab bars in [facility]: Rolling walker, Cane, Oxygen(uses her O2 very seldom)    ADL Assistance: Needs assistance    Homemaking Assistance: Needs assistance    Homemaking Responsibilities: No(spouse performs)    Ambulation Assistance: Independent    Transfer Assistance: Independent    Active : No    Occupation: Retired    Type of occupation: worked in [employer]: patient enjoys [employer]         Family History:   Family History   Problem Relation Age of Onset    Heart Disease Father         cardiac bypass    Arthritis Father     Arthritis Mother     Other Mother          at age 80    Other Sister         [facility]    No Known Problems Daughter     Stroke Son        Review of Systems  unable to provide ROS because of unresponsiveness      Physical Exam  Vitals:    21 1200 21 1300 21 1330 21 1400   BP: (!) 121/44 (!) 138/58 (!) 135/47 (!) 135/44   Pulse: 99 101 102 105   Resp:    Temp: 99.1 °F (37.3 °C) 98.8 °F (37.1 °C)  99.1 °F (37.3 °C)   TempSrc: Bladder Bladder  Bladder   SpO2: 100% 100% 100% 100%   Weight:       Height:         General Appearance: Eyes are open, trying to track, does not follow commands, in no acute distress, on BiPAP  Skin: warm and dry, no rash.    Head: normocephalic and atraumatic  Eyes:  conjunctivae normal, anicteric sclerae  ENT: BIPAP  Lungs: normal respiratory effort, bilateral diffuse scattered rhonchi  Heart: nl S1/S2, no murmur  Abdomen: soft, no distention, + BS  NEUROLOGICAL: Eyes are open but does not follow any commands, does not move extremities  Bilateral upper extremity edema   no leg edema  No erythema, no warmth  Yellow urine in Sanchez        DATA:    Lab Results   Component Value Date    WBC 14.1 (H) 2021    HGB 7.7 (L) 2021    HCT 24.0 (L) 2021    MCV 92.0 2021     2021     Lab Results   Component Value Date    CREATININE 1.75 (H) 2021    BUN 68 (H) 2021     (H) 2021    K 4.2 2021     (H) 2021    CO2 23 2021       Hepatic Function Panel:   Lab Results   Component Value Date    ALKPHOS 98 2021 at S1    DDD (degenerative disc disease), lumbar    Dysphonia    CTS (carpal tunnel syndrome)    High risk medication use-Norco - 12/20/17 OARRS PM&R, 02/20/18 OARRS PM&R, 03/07/18 OARRS PM&R, Urine Drug screen negative 02/06/17 PM&R--MED CONTRACT 2/6/17    SOB (shortness of breath) on exertion    Chest pain    Memory deficit    Artificial lens present    Presbyopia    Astigmatism, regular    Cataract, nuclear sclerotic senile    IDDM (insulin dependent diabetes mellitus)    Regular astigmatism    Nuclear senile cataract    Neck pain    Lumbosacral radiculopathy at L5    Spinal stenosis of lumbar region with neurogenic claudication    Impaired mobility and activities of daily living    Non-compliant patient NO showed FU 1/10/17 Dr Jean-Pierre Pacheco Insomnia secondary to chronic pain    Reactive depression    Diabetic radiculopathy (HCC)    Cervical radicular pain    Diabetic asymmetric polyneuropathy (HCC)    Myalgia    Intercostal neuropathy    Osteoarthritis of spine with radiculopathy, lumbar region    Acute combined systolic and diastolic CHF, NYHA class 1 (HCC)    PMB (postmenopausal bleeding)    Acute on chronic diastolic heart failure (HCC)    CHF (congestive heart failure) (HCC)    Hypertensive urgency    Uncontrolled type 2 diabetes mellitus with hyperglycemia (HCC)    Chronic obstructive pulmonary disease with acute exacerbation (HCC)    Acute on chronic diastolic (congestive) heart failure (HCC)    SAÚL (acute kidney injury) ([address]    Hypoglycemia    HOCM (hypertrophic obstructive cardiomyopathy) (HCC)    Gastrointestinal hemorrhage    COPD exacerbation (HCC)    Anemia    AVM (arteriovenous malformation)    Symptomatic anemia    Flash pulmonary edema (HCC)    Acute on chronic kidney failure (HCC)    Dysarthria    Chronic fatigue    Acute encephalopathy    Adenomatous polyp of sigmoid colon    Adenomatous polyp of transverse colon    Sepsis ([address]    Major depressive disorder in [facility]    Gastroesophageal reflux disease without esophagitis    Acute cystitis without hematuria    CKD (chronic kidney disease) stage 4, GFR 15-29 ml/min ([facility])    Cardiopulmonary arrest ([facility])    Gait abnormality    Late effects of CVA (cerebrovascular accident)    Cardiac arrest ([facility]    Acute respiratory failure with hypoxia ([facility])       PLAN:  Increase meropenem dose to 1 g IV q. 12 hrs  Follow-up CBC BMP  Obtain urinalysis  Oxygen support and weaning as tolerated  Repeat chest x-ray in few days to ensure resolution of pneumonia and effusion    Discussed with AKASH Reyes MD

## 2021-03-26 NOTE — PROGRESS NOTES
Physical Therapy   Facility/Department: [facility] MED SURG IC05/IC05-01    NAME: Brent Paredes    : 1945 (86 y.o.)  MRN: [id number]    Account: [de-identified]  Gender: female    PT evaluation and treatment orders received. Chart reviewed. PT eval attempted. Hold PT eval: trial extubation planned; Per interdisciplinary communication, will hold PT eval this date. Plan to check back 3/27/2021   Will attempt PT evaluation again at earliest convenience.       Electronically signed by Anahi Gonzalez PT on 3/26/21 at 12:46 PM EDT

## 2021-03-26 NOTE — PATIENT CARE CONFERENCE
Patient's  Alexander Wright was bedside this morning and a plan for the patients next level of care was discussed. Patient's  discussed the patients desires and made the decision to make her a limited code status per husbands wishes. Patients daughter came up to the floor and they discussed extubation which patient tolerated well.  Daughter now bedside at [facility]    Electronically signed by Jason Aguilar RN on 3/26/2021 at 4:58 PM

## 2021-03-26 NOTE — PROGRESS NOTES
0730- Assumed care of this patient     1100- Patient extubated, bipap placed on patient and tolerating well at this time. Insulin drip started. Trialed patient on Venti mask to prepare pt for PICC line insertion however patient had an increase effort of breathing and wore out quickly so could not tolerate transportation. Patients family educated on this. Patient now on 3L NC sat'ing at 100% tolerating well. Patient has weak moist cough and is not clearing secretions well. Suctioning pts mouth very often in which pt resists and withdrawals head. Patient follows some commands- nodding appropriately at times. Patient had large BM that was formed and hardened. Patient is up to chair by joe lift. Vitals stable. Will continue to monitor.     Electronically signed by Josephine Lomax RN on 3/26/2021 at 6:39 PM

## 2021-03-26 NOTE — PROGRESS NOTES
[facility] Occupational Therapy      Date: 3/26/2021  Patient Name: Olivia Machado        MRN: [id number]  Account: [de-identified]   : 1945  (67 y.o.)  Room: [place] [address]    Chart reviewed, attempted OT at [facility] [id number] for eval. Patient not seen 2° to:    Hold per nursing request due to: Pt. to trial extubation today per chart, spoke with RN and will hold until success of extubation can be determined. Spoke to [employer], RN aware. Will attempt again when able.     Electronically signed by MONICA Mclaughlin on 3/26/2021 at 10:37 AM

## 2021-03-26 NOTE — CARE COORDINATION
DR SPOKE WITH SPOUSE IN ROUNDS. NOTE FROM HIM AFTER SPEAKING WITH PT'S SPOUSE AND DAUGHTER STATES THEY DO NOT WISH FOR RE-INTUBATION. MAYBE ABLE TO EXTUBATE TODAY. FAMILY DECLINED TRACH. WILL ASK FOR PALLIATIVE CARE ORDER.

## 2021-03-26 NOTE — PROGRESS NOTES
inadequate energy intake related to acute injury/trauma as evidenced by other (comment), intubation(recent intubation)    Nutrition Interventions:   Food and/or Nutrient Delivery:  Continue NPO(Recommend BSE prior to po intake. Carb Control 4 diet as tolerated, Add Diabetic supplement @ B & D until po > 75% established)  Nutrition Education/Counseling:  Education not indicated   Coordination of Nutrition Care:  Continue to monitor while inpatient    Goals:  New goals : progression and tolerance of po diet, gluc < 180       Nutrition Monitoring and Evaluation:     Food/Nutrient Intake Outcomes:  Diet Advancement/Tolerance  Physical Signs/Symptoms Outcomes:  Weight, Hemodynamic Status, GI Status, Biochemical Data     Discharge Planning:     Too soon to determine     Electronically signed by Javi Olivas RD, LD on 3/26/21 at 12:03 PM EDT

## 2021-03-26 NOTE — PROGRESS NOTES
Department of Internal Medicine  General Internal Medicine  Attending Progress Note      SUBJECTIVE:  Pt seen and examined. Extubated. Pulm spoke to family who made it clear they would not want her re-intubated. Risks, including death were explained and family expressed understanding.  Pt on BiPAP this AM. Opens eyes and nods head, but not answering questions vocally    OBJECTIVE      Medications    Current Facility-Administered Medications: lidocaine 2 % injection 5 mL, 5 mL, Intradermal, Once  sodium chloride flush 0.9 % injection 10 mL, 10 mL, Intravenous, 2 times per day  sodium chloride flush 0.9 % injection 10 mL, 10 mL, Intravenous, PRN  0.9 % sodium chloride infusion, 250 mL, Intravenous, Once  carvedilol (COREG) tablet 25 mg, 25 mg, Oral, BID  cloNIDine (CATAPRES) tablet 0.2 mg, 0.2 mg, Oral, BID  insulin regular (HUMULIN R;NOVOLIN R) 100 Units in sodium chloride 0.9 % 100 mL infusion, 0.5 Units/hr, Intravenous, Continuous  glucose (GLUTOSE) 40 % oral gel 15 g, 15 g, Oral, PRN  dextrose 50 % IV solution, 12.5 g, Intravenous, PRN  glucagon (rDNA) injection 1 mg, 1 mg, Intramuscular, PRN  dextrose 5 % solution, 100 mL/hr, Intravenous, PRN  nitroglycerin (NITRO-BID) 2 % ointment 1 inch, 1 inch, Topical, 4 times per day  meropenem (MERREM) 500 mg IVPB (mini-bag), 500 mg, Intravenous, Q12H  methylPREDNISolone sodium (SOLU-MEDROL) injection 40 mg, 40 mg, Intravenous, Daily  pantoprazole (PROTONIX) injection 40 mg, 40 mg, Intravenous, Daily **AND** sodium chloride (PF) 0.9 % injection 10 mL, 10 mL, Intravenous, Daily  heparin (porcine) injection 5,000 Units, 5,000 Units, Subcutaneous, 3 times per day  dexmedetomidine (PRECEDEX) 400 mcg in sodium chloride 0.9 % 100 mL infusion, 0.2-1.4 mcg/kg/hr, Intravenous, Continuous  docusate (COLACE) 50 MG/5ML liquid 100 mg, 100 mg, Oral, BID  polyethylene glycol (GLYCOLAX) packet 17 g, 17 g, Oral, Daily  budesonide (PULMICORT) nebulizer suspension 500 mcg, 0.5 mg, Nebulization, BID  hydrALAZINE (APRESOLINE) injection 10 mg, 10 mg, Intravenous, Q4H PRN **OR** hydrALAZINE (APRESOLINE) injection 20 mg, 20 mg, Intravenous, Q4H PRN  labetalol (NORMODYNE;TRANDATE) injection 20 mg, 20 mg, Intravenous, Q4H PRN  labetalol (NORMODYNE;TRANDATE) injection 40 mg, 40 mg, Intravenous, Q4H PRN  promethazine (PHENERGAN) tablet 12.5 mg, 12.5 mg, Oral, Q6H PRN **OR** ondansetron (ZOFRAN) injection 4 mg, 4 mg, Intravenous, Q6H PRN  chlorhexidine (PERIDEX) 0.12 % solution 15 mL, 15 mL, Mouth/Throat, BID  sodium chloride flush 0.9 % injection 10 mL, 10 mL, Intravenous, 2 times per day  sodium chloride flush 0.9 % injection 10 mL, 10 mL, Intravenous, PRN  acetaminophen (TYLENOL) tablet 650 mg, 650 mg, Oral, Q6H PRN **OR** acetaminophen (TYLENOL) suppository 650 mg, 650 mg, Rectal, Q6H PRN  ipratropium-albuterol (DUONEB) nebulizer solution 1 ampule, 1 ampule, Inhalation, 4x daily  Physical    VITALS:  BP (!) 135/47   Pulse 102   Temp 98.8 °F (37.1 °C) (Bladder)   Resp 25   Ht 4' 11\" (1.499 m)   Wt 153 lb 3.5 oz (69.5 kg)   LMP  (LMP Unknown)   SpO2 100%   BMI 30.95 kg/m²   Constitutional: awake on BIPAP, opens eyes and nods head, non verbal  Head: Normocephalic, atraumatic  ENT: moist mucous membranes, BIPAP in place  Neck: neck supple, trachea midline  Lungs: Good inspiratory effort, bilateral rhonchi and crackles  Heart: RRR, normal S1 and S2  GI: Soft, non-distended, +BS  MSK: no edema noted  Skin: warm, dry     Data    CBC:   Lab Results   Component Value Date    WBC 14.1 03/26/2021    RBC 2.61 03/26/2021    HGB 7.7 03/26/2021    HCT 24.0 03/26/2021    MCV 92.0 03/26/2021    MCH 29.4 03/26/2021    MCHC 31.9 03/26/2021    RDW 17.5 03/26/2021     03/26/2021    MPV 8.8 08/01/2015     CMP:    Lab Results   Component Value Date     03/26/2021    K 4.2 03/26/2021    K 4.4 10/19/2020     03/26/2021    CO2 23 03/26/2021    BUN 68 03/26/2021    CREATININE 1.75 03/26/2021 GFRAA 34.1 03/26/2021    LABGLOM 28.2 03/26/2021    GLUCOSE 295 03/26/2021    PROT 5.7 03/26/2021    LABALBU 2.6 03/26/2021    CALCIUM 9.0 03/26/2021    BILITOT <0.2 03/26/2021    ALKPHOS 98 03/26/2021    AST 11 03/26/2021    ALT 31 03/26/2021       ASSESSMENT AND PLAN      # Cardiac arrest s/p ROSC   - initially required Dopamine, norepinephrine and epinephrine infusion  - off pressors and hypothermia protocol  - TTE showed LVEF of 50%  - cardiology following     # Acute hypoxic respiratory failure  - requiring intubation and mechanical ventilation  - extubated 3/22, but had to be re-intubated same day due to stridor and respiratory distress. Continued to have stridor when cuff deflated  - extubated today to BIPAP. No re-intubation per family  - palliative care consulted  - management per critical care service     # Acute encephalopathy  - likely hypoxic-ischemic etiology from cardiac arrest  - CT head was negative per report  - neurology following     # Acute / chronic anemia - improved  - with Hb of 5.8 on arrival, FOBT was positive  - improved s/p PRBCs given in ED  - on Protonix   - follow H/H      # Lactic acidosis  - in the setting of cardiac arrest  - improved with volume repletion     # Hyperkalemia  - resolved     # SAÚL/CKD3  - nephrology following     # IDDM2 with hyperglycemia  - improved, off insulin drip, on ISS     # Leukocytosis   - improved     Code status - DNRCCA    Disposition: Extubated today to BIPAP. Still lethargic. No re-intubation per family. Off pressors. Les Master for transfer to the floor when ok from intensivist as no critical care needs at this time. Palliative care consulted.       Mike Lowe, DO  Internal Medicine

## 2021-03-26 NOTE — PROGRESS NOTES
Nephrology Progress Note    Assessment:  SAÚL  Non oliguris  Hx DM type-2/ hypertension  S/P cardiac arrest   Hypoxia encephlopathy   Anemia decreased Hgb from yesterday      Plan: do not over diuresis  I<O give extra fluids IV     Patient Active Problem List:     Hypertension     Hyperlipidemia     Uncontrolled type 2 diabetes mellitus with complication, without long-term current use of insulin (HCC)     Fibromyalgia     Anxiety     Smoker     Insomnia     DJD (degenerative joint disease), lumbar     Lumbosacral radiculopathy at S1     DDD (degenerative disc disease), lumbar     Dysphonia     CTS (carpal tunnel syndrome)     High risk medication use-Norco - 12/20/17 OARRS PM&R, 02/20/18 OARRS PM&R, 03/07/18 OARRS PM&R, Urine Drug screen negative 02/06/17 PM&R--MED CONTRACT 2/6/17     SOB (shortness of breath) on exertion     Chest pain     Memory deficit     Artificial lens present     Presbyopia     Astigmatism, regular     Cataract, nuclear sclerotic senile     IDDM (insulin dependent diabetes mellitus)     Regular astigmatism     Nuclear senile cataract     Neck pain     Lumbosacral radiculopathy at L5     Spinal stenosis of lumbar region with neurogenic claudication     Impaired mobility and activities of daily living     Non-compliant patient NO showed FU 1/10/17 Dr Romana Rao     Insomnia secondary to chronic pain     Reactive depression     Diabetic radiculopathy (HCC)     Cervical radicular pain     Diabetic asymmetric polyneuropathy (HCC)     Myalgia     Intercostal neuropathy     Osteoarthritis of spine with radiculopathy, lumbar region     Acute combined systolic and diastolic CHF, NYHA class 1 (HCC)     PMB (postmenopausal bleeding)     Acute on chronic diastolic heart failure (HCC)     CHF (congestive heart failure) ([address]     Hypertensive urgency     Uncontrolled type 2 diabetes mellitus with hyperglycemia ([address]     Chronic obstructive pulmonary disease with acute exacerbation (HCC)     Acute on chronic diastolic (congestive) heart failure (HCC)     SAÚL (acute kidney injury) (HCC)     Hypoglycemia     HOCM (hypertrophic obstructive cardiomyopathy) (HCC)     Gastrointestinal hemorrhage     COPD exacerbation (HCC)     Anemia     AVM (arteriovenous malformation)     Symptomatic anemia     Flash pulmonary edema (HCC)     Acute on chronic kidney failure (HCC)     Dysarthria     Chronic fatigue     Acute encephalopathy     Adenomatous polyp of sigmoid colon     Adenomatous polyp of transverse colon     Sepsis (HCC)     Major depressive disorder in remission ([facility]     Gastroesophageal reflux disease without esophagitis     Acute cystitis without hematuria     CKD (chronic kidney disease) stage 4, GFR 15-29 ml/min (HCC)     Cardiopulmonary arrest ([facility]     Gait abnormality     Late effects of CVA (cerebrovascular accident)     Cardiac arrest ([facility]     Acute respiratory failure with hypoxia ([facility]      Subjective:  Admit Date: 3/18/2021    Interval History: no response on ventilator    Medications:  Scheduled Meds:   insulin glargine  50 Units Subcutaneous Nightly    metoprolol  7.5 mg Intravenous Q4H    nitroglycerin  1 inch Topical 4 times per day    meropenem  500 mg Intravenous Q12H    methylPREDNISolone  40 mg Intravenous Daily    pantoprazole  40 mg Intravenous Daily    And    sodium chloride (PF)  10 mL Intravenous Daily    heparin (porcine)  5,000 Units Subcutaneous 3 times per day    docusate  100 mg Oral BID    polyethylene glycol  17 g Oral Daily    budesonide  0.5 mg Nebulization BID    insulin lispro  0-18 Units Subcutaneous Q6H    chlorhexidine  15 mL Mouth/Throat BID    sodium chloride flush  10 mL Intravenous 2 times per day    ipratropium-albuterol  1 ampule Inhalation 4x daily     Continuous Infusions:   dexmedetomidine 0.2 mcg/kg/hr (03/26/21 0736)    dextrose      propofol 10 mcg/kg/min (03/26/21 0748)       CBC:   Recent Labs     03/25/21  1119 03/26/21  0649   WBC 17.9* 14.1*   HGB 9. 2* 7.7*    331     CMP:    Recent Labs     03/24/21  0526 03/24/21  0526 03/25/21  1009 03/25/21  1119 03/26/21  0649     --   --  144  145* 145*   K 4.2  --   --  4.6  4.6 4.2     --   --  109*  110* 111*   CO2 21  --   --  22  22 23   BUN 44*  --   --  52*  52* 68*   CREATININE 1.98*   < > 2.0* 1.72*  1.63* 1.75*   GLUCOSE 214*  --   --  370*  374* 295*   CALCIUM 9.5  --   --  10.0*  10.0* 9.0   LABGLOM 24.5*   < > 24* 28.8*  30.6* 28.2*    < > = values in this interval not displayed. Troponin: No results for input(s): TROPONINI in the last 72 hours. BNP: No results for input(s): BNP in the last 72 hours. INR:   Recent Labs     03/26/21  0649   INR 1.1     Lipids: No results for input(s): CHOL, LDLDIRECT, TRIG, HDL, AMYLASE, LIPASE in the last 72 hours. Liver:   Recent Labs     03/26/21  0649   AST 11   ALT 31   ALKPHOS 98   PROT 5.7*   LABALBU 2.6*   BILITOT <0.2     Iron:  No results for input(s): IRONS, FERRITIN in the last 72 hours. Invalid input(s): LABIRONS  Urinalysis: No results for input(s): UA in the last 72 hours.     Objective:  Vitals: BP (!) 141/37   Pulse 96   Temp 98.4 °F (36.9 °C) (Bladder)   Resp 26   Ht 4' 11\" (1.499 m)   Wt 153 lb 3.5 oz (69.5 kg)   LMP  (LMP Unknown)   SpO2 100%   BMI 30.95 kg/m²    Wt Readings from Last 3 Encounters:   03/26/21 153 lb 3.5 oz (69.5 kg)   01/15/21 131 lb (59.4 kg)   11/27/20 120 lb (54.4 kg)      24HR INTAKE/OUTPUT:      Intake/Output Summary (Last 24 hours) at 3/26/2021 8433  Last data filed at 3/26/2021 0500  Gross per 24 hour   Intake 2255 ml   Output 3150 ml   Net -895 ml       General: not alert, in no apparent distress  HEENT: normocephalic, atraumatic, anicteric  Neck: supple, no mass  Et in place  Lungs: non-labored respirations, clear to auscultation bilaterally  Heart: regular rate and rhythm, no murmurs or rubs  Abdomen: soft, non-tender, non-distended  Ext: no cyanosis, no peripheral edema  Neuro: not alert and oriented, no gross abnormalities  Psych: normal mood and affect  Skin: no rash      Electronically signed by Jennifer Torres MD

## 2021-03-26 NOTE — PLAN OF CARE
Nutrition Problem #1: Predicted inadequate energy intake  Intervention: Food and/or Nutrient Delivery: Continue NPO(Recommend BSE prior to po intake.  Carb Control 4 diet as tolerated, Add Diabetic supplement @ B & D until po > 75% established)  Nutritional Goals: New goals : progression and tolerance of po diet, gluc < 180

## 2021-03-26 NOTE — PROGRESS NOTES
Progress Note  Patient: Rachel Logan  Unit/Bed: IC05/IC05-01  YOB: 1945  MRN: [id number]  Acct: [de-identified]   Admitting Diagnosis: Cardiac arrest [facility]) [I46.9]  Admit Date:  3/18/2021  Hospital Day: 8    Chief Complaint: CPA    Histories:  Past Medical History:   Diagnosis Date    Anxiety     Asthma     dx 2019 / has smoked since age 14    CHF (congestive heart failure) (HCC)     Chronic back pain     Bilateral L5 S1 Radic on emg--surprisingly worse on the left than the right--pt's symptoms and her MRI show worse on the right    Chronic obstructive pulmonary disease with acute exacerbation ([facility] 10/12/2019    Depression     Fibromyalgia     Hyperlipidemia     meds > 8 yrs    Hypertension     meds > 45 yrs    On home O2     2l per n/c at bedtime mostly,     Osteoarthritis     Type II diabetes mellitus, uncontrolled ([facility]     hx > 8 yrs    Unspecified sleep apnea      Past Surgical History:   Procedure Laterality Date    BACK SURGERY  2017    lumbar disc    CARDIAC CATHETERIZATION  11/3/14     DR. MIRELES / karuna ayala    COLONOSCOPY  08/29/2016    w/polypectomy     COLONOSCOPY N/A 9/29/2020    COLONOSCOPY WITH POLYPECTOMY performed by Sunny Essex, MD at [facility] N/A 10/7/2019    EUA HYSTEROSCOPY DILATATION AND CURETTAGE performed by Patricia Rosenbaum DO at [facility]. [address], COLON, DIAGNOSTIC      EYE SURGERY      [place] with IOL OU / [address]    umbilical hernia repair    WI ESOPHAGOGASTRODUODENOSCOPY TRANSORAL DIAGNOSTIC N/A 3/24/2017    EGD ESOPHAGOGASTRODUODENOSCOPY performed by Elliott Smith MD at [facility] 2/8/2018    negative findings    WI REVISE MEDIAN N/CARPAL TUNNEL SURG Left 6/5/2017    LEFT  CARPAL TUNNEL RELEASE performed by Noah Rowland MD at [facility],6+W/T,IWICE N/A 2/8/2018    TONSILLECTOMY      as child  UPPER GASTROINTESTINAL ENDOSCOPY  2016    w/bx     UPPER GASTROINTESTINAL ENDOSCOPY N/A 2020    EGD possible biopsy performed by Juancarlos Rios MD at [address] [place] [address] N/A 2020    EGD PUSH ENTEROSCOPY performed by Hermann Gallardo MD at [facility]     Family History   Problem Relation Age of Onset    Heart Disease Father         cardiac bypass    Arthritis Father     Arthritis Mother     Other Mother          at age 80    Other [facility]         [facility]    No Known Problems Daughter     Stroke Son      Social History     Socioeconomic History    Marital status:      Spouse name: Moses Redd Number of children: 2    Years of education: 15    Highest education level: High school graduate   Occupational History    Occupation: Retired-   Social Needs    Financial resource strain: Not hard at all   Denn[place] insecurity     Worry: Never true     Inability: Never true    Transportation needs     Medical: No     Non-medical: No   Tobacco Use    Smoking status: Former Smoker     Packs/day: 1.00     Years: 59.00     Pack years: 59.00     Types: Cigarettes     Start date: 2017    Smokeless tobacco: Never Used    Tobacco comment: quit 2-3 weeks ago    Substance and Sexual Activity    Alcohol use: Not Currently    Drug use: No    Sexual activity: Yes     Partners: Male   Lifestyle    Physical activity     Days per week: 0 days     Minutes per session: 0 min    Stress: Only a little   Relationships    Social connections     Talks on phone: More than three times a week     Gets together: More than three times a week     Attends [employer] service: 1 to 4 times per year     Active member of club or organization: No     Attends meetings of clubs or organizations: Never     Relationship status: Living with partner   [facility] Intimate partner violence     Fear of current or ex partner: No     Emotionally abused:  No Physically abused: No     Forced sexual activity: No   Other Topics Concern    None   Social History Narrative    Grew up in [place]     Lives With:  [id number] Spouse    Type of Home: House    Home Layout: Two level, Performs ADL's on one level    Home Access: Stairs to enter with rails    Entrance Stairs - Number of Steps: 4    Bathroom Shower/Tub: Tub/Shower unit, Doors    Bathroom Equipment: Shower chair, Grab bars in [facility]: Rolling walker, Cane, Oxygen(uses her O2 very seldom)    ADL Assistance: Needs assistance    Homemaking Assistance: Needs assistance    Homemaking Responsibilities: No(spouse performs)    Ambulation Assistance: Independent    Transfer Assistance: Independent    Active : No    Occupation: Retired    Type of occupation: worked in [employer]: patient enjoys televsision       Subjective/HPI remains vented 35% FIO2. No Acute events. Minimally responsive. HTN overnight. PRN meds received. Mr. Lord Fairview in room. States wife would not have wanted intubation and life support measures. He requests no reintubation and no Tracheostomy. Discussed with staff and Dr. Deven Jamison.     EKG: ST 82        Review of Systems:   Review of Systems  vented    Physical Examination:    BP (!) 160/45   Pulse 100   Temp 98.4 °F (36.9 °C)   Resp 22   Ht 4' 11\" (1.499 m)   Wt 153 lb 3.5 oz (69.5 kg)   LMP  (LMP Unknown)   SpO2 100%   BMI 30.95 kg/m²    Physical Exam   Constitutional: She appears healthy. No distress. HENT:   Normal cephalic and Atraumatic   Eyes: Pupils are equal, round, and reactive to light. Neck: Normal range of motion and thyroid normal. Neck supple. No JVD present. No neck adenopathy. No thyromegaly present. Cardiovascular: Normal rate, regular rhythm, intact distal pulses and normal pulses. Murmur heard. Pulmonary/Chest: Effort normal and breath sounds normal. She has no wheezes. She has no rales. She exhibits no tenderness. Abdominal: Soft.  Bowel sounds are normal. There is no abdominal tenderness. Musculoskeletal: Normal range of motion. General: No tenderness or edema. Neurological: She is alert and oriented to person, place, and time. Skin: Skin is warm. No cyanosis. Nails show no clubbing.        LABS:  CBC:   Lab Results   Component Value Date    WBC 14.1 03/26/2021    RBC 2.61 03/26/2021    HGB 7.7 03/26/2021    HCT 24.0 03/26/2021    MCV 92.0 03/26/2021    MCH 29.4 03/26/2021    MCHC 31.9 03/26/2021    RDW 17.5 03/26/2021     03/26/2021    MPV 8.8 08/01/2015     CBC with Differential:    Lab Results   Component Value Date    WBC 14.1 03/26/2021    RBC 2.61 03/26/2021    HGB 7.7 03/26/2021    HCT 24.0 03/26/2021     03/26/2021    MCV 92.0 03/26/2021    MCH 29.4 03/26/2021    MCHC 31.9 03/26/2021    RDW 17.5 03/26/2021    NRBC 1 08/28/2020    LYMPHOPCT 5.8 03/26/2021    MONOPCT 9.2 03/26/2021    BASOPCT 0.4 03/26/2021    MONOSABS 1.3 03/26/2021    LYMPHSABS 0.8 03/26/2021    EOSABS 0.0 03/26/2021    BASOSABS 0.1 03/26/2021     CMP:    Lab Results   Component Value Date     03/26/2021    K 4.2 03/26/2021    K 4.4 10/19/2020     03/26/2021    CO2 23 03/26/2021    BUN 68 03/26/2021    CREATININE 1.75 03/26/2021    GFRAA 34.1 03/26/2021    LABGLOM 28.2 03/26/2021    GLUCOSE 295 03/26/2021    PROT 5.7 03/26/2021    LABALBU 2.6 03/26/2021    CALCIUM 9.0 03/26/2021    BILITOT <0.2 03/26/2021    ALKPHOS 98 03/26/2021    AST 11 03/26/2021    ALT 31 03/26/2021     BMP:    Lab Results   Component Value Date     03/26/2021    K 4.2 03/26/2021    K 4.4 10/19/2020     03/26/2021    CO2 23 03/26/2021    BUN 68 03/26/2021    LABALBU 2.6 03/26/2021    CREATININE 1.75 03/26/2021    CALCIUM 9.0 03/26/2021    GFRAA 34.1 03/26/2021    LABGLOM 28.2 03/26/2021    GLUCOSE 295 03/26/2021     Magnesium:    Lab Results   Component Value Date    MG 2.9 03/26/2021     Troponin:    Lab Results   Component Value Date    TROPONINI 0.056 03/19/2021        Active Hospital Problems    Diagnosis Date Noted    Acute respiratory failure with hypoxia ([facility] [J96.01]      Priority: Low    Cardiac arrest ([facility] [I46.9] 03/18/2021     Priority: Low        Assessment/Plan:  1. CPA   2. Respiratory failure- remains vented. Dr. Rosangela García will attempt extubation today with Mr. [facility] guidance of no reintubation and no Tracheostomy. 3. Encephalopathy- not following commands despite being off Sedation. 4. CAD - LAD mild  5. LVEF 55%  6. Echo Severe LVH. 1-2+ MR RVSP 31 mmHg. LVEF 50%  7. History of HCM - need to Keep HR close to 60s. continue iv BB. And  Nitrates   8. SAÚL - Nephrology following- stable   9. Accelerated HTN- adjust meds.        Electronically signed by Vasyl Stapleton MD on 3/26/2021 at 10:13 AM

## 2021-03-26 NOTE — PROGRESS NOTES
Pulmonary & Critical Care Medicine ICU Progress Note  Chief complaint : Post cardiac arrest, acute respiratory failure    Subjunctive/24 hour events :   Patient seen and examined during multidisciplinary rounds with RN, charge nurse, RT, pharmacy, dietitian, and social service. Patient is awake on vent, she follows command, she was able to raise her head up, she has better air leak today compared to yesterday, T-max 99.7, urine output 850 cc, bowels moving, tolerating tube feed, no reported vomiting. Social History     Tobacco Use    Smoking status: Former Smoker     Packs/day: 1.00     Years: 59.00     Pack years: 59.00     Types: Cigarettes     Start date: 2017    Smokeless tobacco: Never Used    Tobacco comment: quit 2-3 weeks ago    Substance Use Topics    Alcohol use: Not Currently         Problem Relation Age of Onset    Heart Disease Father         cardiac bypass    Arthritis Father     Arthritis Mother     Other Mother          at age 80    Other Sister         [facility]    No Known Problems Daughter     Stroke Son        Recent Labs     21  0949 21  1009   PHART 7.364 7.383   RQY5MIX 44 37   PO2ART 99* 118*       MV Settings:  Vent Mode: (S) CPAP Rate Set: 16 bmp/Vt Ordered: 350 mL/ /FiO2 : 35 %           IV:   sodium chloride      insulin      dextrose      dexmedetomidine 0.2 mcg/kg/hr (21 0736)    propofol 10 mcg/kg/min (21 0748)       Vitals:  BP (!) 160/45   Pulse 100   Temp 98.4 °F (36.9 °C)   Resp 22   Ht 4' 11\" (1.499 m)   Wt 153 lb 3.5 oz (69.5 kg)   LMP  (LMP Unknown)   SpO2 100%   BMI 30.95 kg/m²    Tmax:        Intake/Output Summary (Last 24 hours) at 3/26/2021 1022  Last data filed at 3/26/2021 1726  Gross per 24 hour   Intake 2355 ml   Output 3150 ml   Net -795 ml       EXAM:  General: Open her eyes, follows simple commands, on vent, no distress  Head: normocephalic, atraumatic  Eyes:No gross abnormalities.   ENT:  MMM no lesions, ET and OG tube in  Neck:  supple and no masses  Chest : Good air movement, minimal basal rales, no wheezing, nontender, tympanic  Heart[de-identified] Heart sounds are normal.  Regular rate and rhythm without murmur, gallop or rub. ABD:  symmetric, soft, non-tender, no guarding or rebound  Musculoskeletal : no cyanosis, no clubbing and no edema  Neuro: Awake, follows commands, moves all 4 extremities but weak  Skin: No rashes or nodules noted. Lymph node:  no cervical nodes  Urology: Yes Sanchez   Psychiatric: Open her eyes, follows commands and calm    Medications:  Scheduled Meds:   lidocaine  5 mL Intradermal Once    sodium chloride flush  10 mL Intravenous 2 times per day    carvedilol  25 mg Oral BID    cloNIDine  0.2 mg Oral BID    nitroglycerin  1 inch Topical 4 times per day    meropenem  500 mg Intravenous Q12H    methylPREDNISolone  40 mg Intravenous Daily    pantoprazole  40 mg Intravenous Daily    And    sodium chloride (PF)  10 mL Intravenous Daily    heparin (porcine)  5,000 Units Subcutaneous 3 times per day    docusate  100 mg Oral BID    polyethylene glycol  17 g Oral Daily    budesonide  0.5 mg Nebulization BID    chlorhexidine  15 mL Mouth/Throat BID    sodium chloride flush  10 mL Intravenous 2 times per day    ipratropium-albuterol  1 ampule Inhalation 4x daily       PRN Meds:  sodium chloride flush, glucose, dextrose, glucagon (rDNA), dextrose, hydrALAZINE **OR** hydrALAZINE, labetalol, labetalol, promethazine **OR** ondansetron, sodium chloride flush, acetaminophen **OR** acetaminophen    Results: reviewed by me   CBC:   Recent Labs     03/24/21  0526 03/24/21  0526 03/25/21  1009 03/25/21  1119 03/26/21  0649   WBC 19.9*  --   --  17.9* 14.1*   HGB 8.9*   < > 13.5 9.2* 7.7*   HCT 28.5*  --   --  29.0* 24.0*   MCV 93.4  --   --  92.1 92.0     --   --  374 331    < > = values in this interval not displayed.      BMP:   Recent Labs     03/24/21  0526 03/24/21  0526 03/25/21  1009 03/25/21  1119 03/26/21  0649     --   --  144  145* 145*   K 4.2  --   --  4.6  4.6 4.2     --   --  109*  110* 111*   CO2 21  --   --  22  22 23   PHOS 2.6  --   --  3.0  3.0 2.7   BUN 44*  --   --  52*  52* 68*   CREATININE 1.98*   < > 2.0* 1.72*  1.63* 1.75*    < > = values in this interval not displayed. LIVER PROFILE:   Recent Labs     03/24/21  0526 03/25/21  1119 03/26/21  0649   AST 28 15 11   ALT 68* 46* 31   BILITOT <0.2 <0.2 <0.2   ALKPHOS 120 123 98     PT/INR:   Recent Labs     03/24/21  0526 03/25/21  1119 03/26/21  0649   PROTIME 14.6 14.8 14.3   INR 1.1 1.2 1.1     APTT:   Recent Labs     03/24/21  0526 03/25/21  1119 03/26/21  0649   APTT 38.2* 45.2* 60.3*     UA:No results for input(s): NITRITE, COLORU, PHUR, LABCAST, WBCUA, RBCUA, MUCUS, TRICHOMONAS, YEAST, BACTERIA, CLARITYU, SPECGRAV, LEUKOCYTESUR, UROBILINOGEN, BILIRUBINUR, BLOODU, GLUCOSEU, AMORPHOUS in the last 72 hours. Invalid input(s): Horacio Cline    Cultures:  ESBL in the urine   Films:  CXR reviewed by me and it showed left lower lobe atelectasis/effusion, slightly congested. Assessment:   This is a critically ill patient at risk of deterioration / death , needing close ICU monitoring and intervention due to below noted problems   · Acute hypoxic respiratory failure due to cardiac arrest  · Cardiac arrest, possibly induced by hyperkalemia  · Acute on chronic kidney injury, improving  · Shock physiology resolved  · Diabetes  · High tube feed residuals, likely gastroparesis  · ESBL UTI    Recommendations  · Vent support lung protective strategy  · head of the bed 30°  · Breathing trial today and sedation holiday   · Minimize to no sedation  · Watch for ICU delirium: TV on, natural light, avoid benzos, pain control, early mobility, and family engagement  · PUD prophylaxis  · DVT prophylaxis  · Continue cardioprotective meds  · Insulin drip while on steroids, to maintain target blood sugar  · Monitor blood sugar target 140180  · Monitor urine output  · Continue Solu-Medrol, will consider starting taper tomorrow  · Continue meropenem  · Consult ID    Patient did fine on breathing trial, discussed with patient  and daughter, they do not wish to do tracheostomy, I still worry that she might fail due to upper airway edema, however she does have acceptable leak today, will proceed with extubation, family does not wish to do reintubation, if she fails they accept the fact she might die. Discussed with Dr. Jodie Bustos     Due to the immediate potential for life-threatening deterioration due to acute respiratory failure I spent 34 minutes providing critical care.  This time is excluding time spent performing procedures.           Electronically signed by Ruthy Sethi MD,  [facility] ,on 3/26/2021 at 10:22 AM

## 2021-03-27 LAB
ALBUMIN SERPL-MCNC: 2.6 G/DL (ref 3.5–4.6)
ALP BLD-CCNC: 100 U/L (ref 40–130)
ALT SERPL-CCNC: 31 U/L (ref 0–33)
ANION GAP SERPL CALCULATED.3IONS-SCNC: 16 MEQ/L (ref 9–15)
APTT: 26.7 SEC (ref 24.4–36.8)
AST SERPL-CCNC: 26 U/L (ref 0–35)
BASOPHILS ABSOLUTE: 0.1 K/UL (ref 0–0.2)
BASOPHILS RELATIVE PERCENT: 0.4 %
BILIRUB SERPL-MCNC: <0.2 MG/DL (ref 0.2–0.7)
BUN BLDV-MCNC: 60 MG/DL (ref 8–23)
CALCIUM SERPL-MCNC: 9.6 MG/DL (ref 8.5–9.9)
CHLORIDE BLD-SCNC: 113 MEQ/L (ref 95–107)
CO2: 19 MEQ/L (ref 20–31)
CREAT SERPL-MCNC: 1.48 MG/DL (ref 0.5–0.9)
EOSINOPHILS ABSOLUTE: 0 K/UL (ref 0–0.7)
EOSINOPHILS RELATIVE PERCENT: 0.3 %
GFR AFRICAN AMERICAN: 41.4
GFR NON-AFRICAN AMERICAN: 34.2
GLOBULIN: 3.7 G/DL (ref 2.3–3.5)
GLUCOSE BLD-MCNC: 125 MG/DL (ref 60–115)
GLUCOSE BLD-MCNC: 136 MG/DL (ref 60–115)
GLUCOSE BLD-MCNC: 349 MG/DL (ref 60–115)
GLUCOSE BLD-MCNC: 403 MG/DL (ref 60–115)
GLUCOSE BLD-MCNC: 467 MG/DL (ref 60–115)
GLUCOSE BLD-MCNC: 82 MG/DL (ref 60–115)
GLUCOSE BLD-MCNC: 85 MG/DL (ref 70–99)
GLUCOSE BLD-MCNC: 89 MG/DL (ref 60–115)
HCT VFR BLD CALC: 33.5 % (ref 37–47)
HEMOGLOBIN: 10.9 G/DL (ref 12–16)
INR BLD: 1
LYMPHOCYTES ABSOLUTE: 2.2 K/UL (ref 1–4.8)
LYMPHOCYTES RELATIVE PERCENT: 13.7 %
MAGNESIUM: 2.6 MG/DL (ref 1.7–2.4)
MCH RBC QN AUTO: 29.7 PG (ref 27–31.3)
MCHC RBC AUTO-ENTMCNC: 32.5 % (ref 33–37)
MCV RBC AUTO: 91.4 FL (ref 82–100)
MONOCYTES ABSOLUTE: 1.6 K/UL (ref 0.2–0.8)
MONOCYTES RELATIVE PERCENT: 9.8 %
NEUTROPHILS ABSOLUTE: 12.3 K/UL (ref 1.4–6.5)
NEUTROPHILS RELATIVE PERCENT: 75.8 %
PDW BLD-RTO: 17.3 % (ref 11.5–14.5)
PERFORMED ON: ABNORMAL
PERFORMED ON: NORMAL
PERFORMED ON: NORMAL
PHOSPHORUS: 3.8 MG/DL (ref 2.3–4.8)
PLATELET # BLD: 429 K/UL (ref 130–400)
POTASSIUM SERPL-SCNC: 5.1 MEQ/L (ref 3.4–4.9)
PROTHROMBIN TIME: 13.6 SEC (ref 12.3–14.9)
RBC # BLD: 3.67 M/UL (ref 4.2–5.4)
SODIUM BLD-SCNC: 148 MEQ/L (ref 135–144)
TOTAL PROTEIN: 6.3 G/DL (ref 6.3–8)
WBC # BLD: 16.2 K/UL (ref 4.8–10.8)

## 2021-03-27 PROCEDURE — 92610 EVALUATE SWALLOWING FUNCTION: CPT

## 2021-03-27 PROCEDURE — 2580000003 HC RX 258: Performed by: INTERNAL MEDICINE

## 2021-03-27 PROCEDURE — 99233 SBSQ HOSP IP/OBS HIGH 50: CPT | Performed by: INTERNAL MEDICINE

## 2021-03-27 PROCEDURE — 6370000000 HC RX 637 (ALT 250 FOR IP): Performed by: INTERNAL MEDICINE

## 2021-03-27 PROCEDURE — 80053 COMPREHEN METABOLIC PANEL: CPT

## 2021-03-27 PROCEDURE — 97163 PT EVAL HIGH COMPLEX 45 MIN: CPT

## 2021-03-27 PROCEDURE — 82330 ASSAY OF CALCIUM: CPT

## 2021-03-27 PROCEDURE — 85730 THROMBOPLASTIN TIME PARTIAL: CPT

## 2021-03-27 PROCEDURE — 94640 AIRWAY INHALATION TREATMENT: CPT

## 2021-03-27 PROCEDURE — 85610 PROTHROMBIN TIME: CPT

## 2021-03-27 PROCEDURE — 6360000002 HC RX W HCPCS: Performed by: INTERNAL MEDICINE

## 2021-03-27 PROCEDURE — 36415 COLL VENOUS BLD VENIPUNCTURE: CPT

## 2021-03-27 PROCEDURE — 85025 COMPLETE CBC W/AUTO DIFF WBC: CPT

## 2021-03-27 PROCEDURE — 83735 ASSAY OF MAGNESIUM: CPT

## 2021-03-27 PROCEDURE — 97167 OT EVAL HIGH COMPLEX 60 MIN: CPT

## 2021-03-27 PROCEDURE — 97110 THERAPEUTIC EXERCISES: CPT

## 2021-03-27 PROCEDURE — 94761 N-INVAS EAR/PLS OXIMETRY MLT: CPT

## 2021-03-27 PROCEDURE — 2700000000 HC OXYGEN THERAPY PER DAY

## 2021-03-27 PROCEDURE — 2000000000 HC ICU R&B

## 2021-03-27 PROCEDURE — 2500000003 HC RX 250 WO HCPCS: Performed by: INTERNAL MEDICINE

## 2021-03-27 PROCEDURE — 84100 ASSAY OF PHOSPHORUS: CPT

## 2021-03-27 PROCEDURE — C9113 INJ PANTOPRAZOLE SODIUM, VIA: HCPCS | Performed by: INTERNAL MEDICINE

## 2021-03-27 RX ORDER — VERAPAMIL HYDROCHLORIDE 80 MG/1
80 TABLET ORAL 2 TIMES DAILY
Status: DISCONTINUED | OUTPATIENT
Start: 2021-03-27 | End: 2021-03-29

## 2021-03-27 RX ORDER — INSULIN GLARGINE 100 [IU]/ML
20 INJECTION, SOLUTION SUBCUTANEOUS NIGHTLY
Status: DISCONTINUED | OUTPATIENT
Start: 2021-03-27 | End: 2021-03-28 | Stop reason: SDUPTHER

## 2021-03-27 RX ADMIN — INSULIN LISPRO 4 UNITS: 100 INJECTION, SOLUTION INTRAVENOUS; SUBCUTANEOUS at 15:39

## 2021-03-27 RX ADMIN — NITROGLYCERIN 1 INCH: 20 OINTMENT TOPICAL at 05:46

## 2021-03-27 RX ADMIN — LABETALOL HYDROCHLORIDE 40 MG: 5 INJECTION, SOLUTION INTRAVENOUS at 13:02

## 2021-03-27 RX ADMIN — BUDESONIDE 500 MCG: 0.5 SUSPENSION RESPIRATORY (INHALATION) at 15:12

## 2021-03-27 RX ADMIN — NITROGLYCERIN 1 INCH: 20 OINTMENT TOPICAL at 11:53

## 2021-03-27 RX ADMIN — BUDESONIDE 500 MCG: 0.5 SUSPENSION RESPIRATORY (INHALATION) at 04:40

## 2021-03-27 RX ADMIN — SODIUM CHLORIDE, PRESERVATIVE FREE 10 ML: 5 INJECTION INTRAVENOUS at 20:29

## 2021-03-27 RX ADMIN — HEPARIN SODIUM 5000 UNITS: 5000 INJECTION INTRAVENOUS; SUBCUTANEOUS at 11:55

## 2021-03-27 RX ADMIN — IPRATROPIUM BROMIDE AND ALBUTEROL SULFATE 1 AMPULE: .5; 3 SOLUTION RESPIRATORY (INHALATION) at 04:40

## 2021-03-27 RX ADMIN — MEROPENEM 1000 MG: 1 INJECTION, POWDER, FOR SOLUTION INTRAVENOUS at 11:52

## 2021-03-27 RX ADMIN — IPRATROPIUM BROMIDE AND ALBUTEROL SULFATE 1 AMPULE: .5; 3 SOLUTION RESPIRATORY (INHALATION) at 19:58

## 2021-03-27 RX ADMIN — MEROPENEM 1000 MG: 1 INJECTION, POWDER, FOR SOLUTION INTRAVENOUS at 20:29

## 2021-03-27 RX ADMIN — Medication 10 ML: at 11:55

## 2021-03-27 RX ADMIN — INSULIN GLARGINE 20 UNITS: 100 INJECTION, SOLUTION SUBCUTANEOUS at 21:36

## 2021-03-27 RX ADMIN — Medication 10 ML: at 20:31

## 2021-03-27 RX ADMIN — NITROGLYCERIN 1 INCH: 20 OINTMENT TOPICAL at 19:26

## 2021-03-27 RX ADMIN — CLONIDINE HYDROCHLORIDE 0.2 MG: 0.1 TABLET ORAL at 20:29

## 2021-03-27 RX ADMIN — CARVEDILOL 25 MG: 25 TABLET, FILM COATED ORAL at 11:51

## 2021-03-27 RX ADMIN — SODIUM CHLORIDE, PRESERVATIVE FREE 10 ML: 5 INJECTION INTRAVENOUS at 11:54

## 2021-03-27 RX ADMIN — METHYLPREDNISOLONE SODIUM SUCCINATE 40 MG: 40 INJECTION, POWDER, FOR SOLUTION INTRAMUSCULAR; INTRAVENOUS at 11:53

## 2021-03-27 RX ADMIN — SODIUM CHLORIDE, PRESERVATIVE FREE 10 ML: 5 INJECTION INTRAVENOUS at 20:31

## 2021-03-27 RX ADMIN — IPRATROPIUM BROMIDE AND ALBUTEROL SULFATE 1 AMPULE: .5; 3 SOLUTION RESPIRATORY (INHALATION) at 11:15

## 2021-03-27 RX ADMIN — VERAPAMIL HYDROCHLORIDE 80 MG: 80 TABLET, FILM COATED ORAL at 20:50

## 2021-03-27 RX ADMIN — PANTOPRAZOLE SODIUM 40 MG: 40 INJECTION, POWDER, FOR SOLUTION INTRAVENOUS at 11:53

## 2021-03-27 RX ADMIN — CLONIDINE HYDROCHLORIDE 0.2 MG: 0.1 TABLET ORAL at 11:52

## 2021-03-27 RX ADMIN — HEPARIN SODIUM 5000 UNITS: 5000 INJECTION INTRAVENOUS; SUBCUTANEOUS at 21:45

## 2021-03-27 RX ADMIN — HEPARIN SODIUM 5000 UNITS: 5000 INJECTION INTRAVENOUS; SUBCUTANEOUS at 05:46

## 2021-03-27 RX ADMIN — CARVEDILOL 25 MG: 25 TABLET, FILM COATED ORAL at 20:29

## 2021-03-27 RX ADMIN — IPRATROPIUM BROMIDE AND ALBUTEROL SULFATE 1 AMPULE: .5; 3 SOLUTION RESPIRATORY (INHALATION) at 15:12

## 2021-03-27 RX ADMIN — DEXTROSE MONOHYDRATE 250 ML: 50 INJECTION, SOLUTION INTRAVENOUS at 11:53

## 2021-03-27 ASSESSMENT — ENCOUNTER SYMPTOMS
VOMITING: 0
BACK PAIN: 0
PHOTOPHOBIA: 0
TROUBLE SWALLOWING: 1
STRIDOR: 0
BLOOD IN STOOL: 0
DIARRHEA: 0
SHORTNESS OF BREATH: 0
VOICE CHANGE: 1
COUGH: 0
GASTROINTESTINAL NEGATIVE: 1
RESPIRATORY NEGATIVE: 1
EYES NEGATIVE: 1
WHEEZING: 0
ALLERGIC/IMMUNOLOGIC NEGATIVE: 1
NAUSEA: 0
SORE THROAT: 1
CHEST TIGHTNESS: 0

## 2021-03-27 ASSESSMENT — PAIN SCALES - GENERAL
PAINLEVEL_OUTOF10: 0

## 2021-03-27 NOTE — PROGRESS NOTES
Nephrology Progress Note  * minor miracle// GFR improving  Assessment:  SAÚL post cardiac arrest  Extubated on nasal O2  Resolving encephalopathy  Hypernatremia       With mild hyperkalemia   Hx DM type-2/Hypertension  Hyperlipidemia        Plan:  Bolus dextrose d/t high Na+// no dialysis needs    Patient Active Problem List:     Hypertension     Hyperlipidemia     Uncontrolled type 2 diabetes mellitus with complication, without long-term current use of insulin (HCC)     Fibromyalgia     Anxiety     Smoker     Insomnia     DJD (degenerative joint disease), lumbar     Lumbosacral radiculopathy at S1     DDD (degenerative disc disease), lumbar     Dysphonia     CTS (carpal tunnel syndrome)     High risk medication use-Norco - 12/20/17 OARRS PM&R, 02/20/18 OARRS PM&R, 03/07/18 OARRS PM&R, Urine Drug screen negative 02/06/17 PM&R--MED CONTRACT 2/6/17     SOB (shortness of breath) on exertion     Chest pain     Memory deficit     Artificial lens present     Presbyopia     Astigmatism, regular     Cataract, nuclear sclerotic senile     IDDM (insulin dependent diabetes mellitus)     Regular astigmatism     Nuclear senile cataract     Neck pain     Lumbosacral radiculopathy at L5     Spinal stenosis of lumbar region with neurogenic claudication     Impaired mobility and activities of daily living     Non-compliant patient NO showed FU 1/10/17 Dr Natalie Garcia     Insomnia secondary to chronic pain     Reactive depression     Diabetic radiculopathy (HCC)     Cervical radicular pain     Diabetic asymmetric polyneuropathy (HCC)     Myalgia     Intercostal neuropathy     Osteoarthritis of spine with radiculopathy, lumbar region     Acute combined systolic and diastolic CHF, NYHA class 1 (HCC)     PMB (postmenopausal bleeding)     Acute on chronic diastolic heart failure (HCC)     CHF (congestive heart failure) ([facility] [address]     Hypertensive urgency     Uncontrolled type 2 diabetes mellitus with hyperglycemia (HCC)     Chronic obstructive abnormalities  Psych: normal mood and affect  Skin: no rash      Electronically signed by Sharon Astudillo MD

## 2021-03-27 NOTE — PROGRESS NOTES
Infectious Diseases Inpatient Progress Note          HISTORY OF PRESENT ILLNESS:  Follow up ESBL E. coli UTI and aspiration pneumonia of bilateral lower lobe status post successful resuscitation for cardiac arrest on IV meropenem, well tolerated. Patient continues to improve. Decreased obtundation. Passed swallow evaluation. Trying to talk. Weak voice and very weak all over. Occasional weak cough. Clear urine in the Sanchez with minimal sediment. No diarrhea reported.   Current Medications:     insulin lispro  0-6 Units Subcutaneous 6x Daily    verapamil  120 mg Oral BID    lidocaine  5 mL Intradermal Once    sodium chloride flush  10 mL Intravenous 2 times per day    carvedilol  25 mg Oral BID    cloNIDine  0.2 mg Oral BID    meropenem  1,000 mg Intravenous Q12H    nitroglycerin  1 inch Topical 4 times per day    methylPREDNISolone  40 mg Intravenous Daily    pantoprazole  40 mg Intravenous Daily    And    sodium chloride (PF)  10 mL Intravenous Daily    heparin (porcine)  5,000 Units Subcutaneous 3 times per day    docusate  100 mg Oral BID    polyethylene glycol  17 g Oral Daily    budesonide  0.5 mg Nebulization BID    chlorhexidine  15 mL Mouth/Throat BID    sodium chloride flush  10 mL Intravenous 2 times per day    ipratropium-albuterol  1 ampule Inhalation 4x daily       Allergies:  Ibuprofen, Metformin and related, and Darvon [propoxyphene hcl]      Review of Systems  unable to provide ROS because of acute illness      Physical Exam  Vitals:    03/27/21 0601 03/27/21 0621 03/27/21 0700 03/27/21 1114   BP:   (!) 175/51    Pulse: 107 111 110 101   Resp: 29 27 21 20   Temp:       TempSrc:       SpO2: 100% 100% 100% 100%   Weight: 153 lb 14.1 oz (69.8 kg)      Height: 4' 11\" (1.499 m)        General Appearance: alert and oriented to self, well-developed and well-nourished, in no acute distress, on 2 L nasal cannula  Weak voice  Decreased motor power in all extremities  Decreased bilateral upper extremity edema  Skin: warm and dry, no rash. Head: normocephalic and atraumatic  Eyes: anicteric sclerae  ENT: oropharynx clear and moist with normal mucous membranes.  No oral thrush  Lungs: normal respiratory effort, resolved rhonchi, diminished breath sounds bilateral bases with few rales  Heart: Irregular with rapid rate  Abdomen: soft, no tenderness  No leg edema  No erythema, no tenderness      DATA:    Lab Results   Component Value Date    WBC 16.2 (H) 03/27/2021    HGB 10.9 (L) 03/27/2021    HCT 33.5 (L) 03/27/2021    MCV 91.4 03/27/2021     (H) 03/27/2021     Lab Results   Component Value Date    CREATININE 1.48 (H) 03/27/2021    BUN 60 (H) 03/27/2021     (H) 03/27/2021    K 5.1 (H) 03/27/2021     (H) 03/27/2021    CO2 19 (L) 03/27/2021       Hepatic Function Panel:  Lab Results   Component Value Date    ALKPHOS 100 03/27/2021    ALT 31 03/27/2021    AST 26 03/27/2021    PROT 6.3 03/27/2021    BILITOT <0.2 03/27/2021    BILIDIR <0.2 01/19/2019    IBILI see below 01/19/2019    LABALBU 2.6 03/27/2021       IMPRESSION:    · ESBL E. coli UTI  · Aspiration pneumonia of bilateral lower lungs  · Cardiac arrest with anoxic encephalopathy  · Acute respiratory failure    Patient Active Problem List   Diagnosis    Hypertension    Hyperlipidemia    Uncontrolled type 2 diabetes mellitus with complication, without long-term current use of insulin (HCC)    Fibromyalgia    Anxiety    Smoker    Insomnia    DJD (degenerative joint disease), lumbar    Lumbosacral radiculopathy at S1    DDD (degenerative disc disease), lumbar    Dysphonia    CTS (carpal tunnel syndrome)    High risk medication use-Norco - 12/20/17 [facility] PM&R, 02/20/18 [facility] PM&R, 03/07/18 [facility] PM&R, Urine Drug screen negative 02/06/17 PM&R--MED CONTRACT 2/6/17    SOB (shortness of breath) on exertion    Chest pain    Memory deficit    Artificial lens present    Presbyopia    Astigmatism, regular    Cataract, nuclear sclerotic senile    IDDM (insulin dependent diabetes mellitus)    Regular astigmatism    Nuclear senile cataract    Neck pain    Lumbosacral radiculopathy at L5    Spinal stenosis of lumbar region with neurogenic claudication    Impaired mobility and activities of daily living    Non-compliant patient NO showed FU 1/10/17 Dr Elnei Zeng Insomnia secondary to chronic pain    Reactive depression    Diabetic radiculopathy (HCC)    Cervical radicular pain    Diabetic asymmetric polyneuropathy (HCC)    Myalgia    Intercostal neuropathy    Osteoarthritis of spine with radiculopathy, lumbar region    Acute combined systolic and diastolic CHF, NYHA class 1 (HCC)    PMB (postmenopausal bleeding)    Acute on chronic diastolic heart failure (HCC)    CHF [facility]congestive heart failure) [facility])    Hypertensive urgency    Uncontrolled type 2 diabetes mellitus with hyperglycemia (HCC)    Chronic obstructive pulmonary disease with acute exacerbation (HCC)    Acute on chronic diastolic (congestive) heart failure (HCC)    SAÚL (acute kidney injury) ([address]    Hypoglycemia    HOCM (hypertrophic obstructive cardiomyopathy) ([facility])    Gastrointestinal hemorrhage    COPD exacerbation (HCC)    Anemia    AVM (arteriovenous malformation)    Symptomatic anemia    Flash pulmonary edema (HCC)    Acute on chronic kidney failure (HCC)    Dysarthria    Chronic fatigue    Acute encephalopathy    Adenomatous polyp of sigmoid colon    Adenomatous polyp of transverse colon    Sepsis (HCC)    Major depressive disorder in remission ([address]    Gastroesophageal reflux disease without esophagitis    Acute cystitis without hematuria    CKD (chronic kidney disease) stage 4, GFR 15-29 ml/min (HCC)    Cardiopulmonary arrest (HCC)    Gait abnormality    Late effects of CVA (cerebrovascular accident)    Cardiac arrest ([address]    Acute respiratory failure with hypoxia (HCC)       PLAN:  · Continue IV meropenem for 1 more week  · DC Sanchez catheter when okay with nephrology  · Follow-up CBC BMP  · Oxygen support and weaning as tolerated    Discussed with patient and RN    La Miller MD

## 2021-03-27 NOTE — PROGRESS NOTES
INPATIENT PROGRESS NOTES    PATIENT NAME: Marianela Collins  MRN: [id number]  SERVICE DATE:  March 27, 2021   SERVICE TIME:  11:13 AM      PRIMARY SERVICE: Pulmonary Disease    CHIEF COMPLAIN: Post cardiac arrest, acute respiratory failure    INTERVAL HPI: Patient seen and examined at bedside, Interval Notes, orders reviewed. Nursing notes noted  Patient is extubated on O2 via nasal cannula. She is on 2 L O2 saturation is 100%. She is following commands. As per RN she is not allowing to suction orally. She is having upper airway wheezing. Family does not want reintubation. She is afebrile. No vomiting or diarrhea. Tolerating tube feeding. OBJECTIVE    Body mass index is 31.08 kg/m². PHYSICAL EXAM:  Vitals:  BP (!) 175/51   Pulse 110   Temp 98.5 °F (36.9 °C) (Bladder)   Resp 21   Ht 4' 11\" (1.499 m)   Wt 153 lb 14.1 oz (69.8 kg)   LMP  (LMP Unknown)   SpO2 100%   BMI 31.08 kg/m²   General: Alert, awake . comfortable in bed, No distress. Head: Atraumatic , Normocephalic   Eyes: PERRL. No sclera icterus. No conjunctival injection. No discharge   ENT: No nasal  discharge. Pharynx clear. Neck:  Trachea midline. No thyromegaly, no JVD, No cervical adenopathy. Chest : Bilaterally symmetrical ,Normal effort,  No accessory muscle use  Lung : . Fair BS bilateral, decreased BS at bases. Few bibasilar Rales. Upper airway wheezing. No stridor. No rhonchi. Heart[de-identified] Normal  rate. Regular rhythm. No mumur ,  Rub or gallop  ABD: Non-tender. Non-distended. No masses. No organmegaly. Normal bowel sounds. No hernia.   Ext : No Pitting both leg , No Cyanosis No clubbing  Neuro: no focal weakness          DATA:   Recent Labs     03/26/21  0649 03/27/21 0518   WBC 14.1* 16.2*   HGB 7.7* 10.9*   HCT 24.0* 33.5*   MCV 92.0 91.4    429*     Recent Labs     03/26/21  0649 03/27/21 0518   * 148*   K 4.2 5.1*   * 113*   CO2 23 19*   BUN 68* 60*   CREATININE 1.75* 1.48*   GLUCOSE 295* 85   CALCIUM 9.0 9.6   PROT 5.7* 6.3   LABALBU 2.6* 2.6*   BILITOT <0.2 <0.2   ALKPHOS 98 100   AST 11 26   ALT 31 31   LABGLOM 28.2* 34.2*   GFRAA 34.1* 41.4*   GLOB 3.1 3.7*       MV Settings:     Vent Mode: (S) CPAP  Vt Ordered: 350 mL  Rate Set: 16 bmp  FiO2 : 40 %  PEEP/CPAP: 6  Pressure Support: 5 cmH20  Peak Inspiratory Pressure: 14 cmH2O  Mean Airway Pressure: 9 cmH20  I:E Ratio: 1:1.60    Recent Labs     03/25/21  1009   PHART 7.383   SCB9BVE 43   PO2ART 118*   [id number] 25.6   BEART 1   [id number] 99*       O2 Device: Nasal cannula  O2 Flow Rate (L/min): 3 L/min    DIET TUBE FEED CONTINUOUS/CYCLIC NPO; Semi-elemental (Vital 1.2);  Orogastric; Continuous; 20; 45; 24     MEDICATIONS during current hospitalization:    Continuous Infusions:   sodium chloride      dextrose      sodium chloride      dexmedetomidine Stopped (03/26/21 0900)       Scheduled Meds:   insulin lispro  0-6 Units Subcutaneous 6x Daily    dextrose 5%  750 mL Intravenous Once    lidocaine  5 mL Intradermal Once    sodium chloride flush  10 mL Intravenous 2 times per day    carvedilol  25 mg Oral BID    cloNIDine  0.2 mg Oral BID    meropenem  1,000 mg Intravenous Q12H    nitroglycerin  1 inch Topical 4 times per day    methylPREDNISolone  40 mg Intravenous Daily    pantoprazole  40 mg Intravenous Daily    And    sodium chloride (PF)  10 mL Intravenous Daily    heparin (porcine)  5,000 Units Subcutaneous 3 times per day    docusate  100 mg Oral BID    polyethylene glycol  17 g Oral Daily    budesonide  0.5 mg Nebulization BID    chlorhexidine  15 mL Mouth/Throat BID    sodium chloride flush  10 mL Intravenous 2 times per day    ipratropium-albuterol  1 ampule Inhalation 4x daily       PRN Meds:sodium chloride flush, glucose, dextrose, glucagon (rDNA), dextrose, sodium chloride, hydrALAZINE **OR** hydrALAZINE, labetalol, labetalol, promethazine **OR** ondansetron, sodium chloride flush, acetaminophen **OR** [facility]    Radiology  Echocardiogram Complete 2d With Doppler With Color    Result Date: 3/19/2021  Transthoracic Echocardiography Report (TTE)  Demographics  Patient Name   Gin Real  Gender              Female                 M  Patient Number [id number]        Race                Black                                 Ethnicity  Visit Number   [id number]       Room Number         IC05  Corporate ID                   Date of Study       03/19/2021  Accession      [id number]      Referring Physician  Number  Date of Birth  1945      Sonographer         Kevin Seo,                                                     MICHELLE  Age            67 year(s)      [place] [address] Cardiology                                 Physician           Fermin Jean MD Procedure Type of Study  TTE procedure:ECHO COMPLETE 2D W/DOP W/COLOR. Procedure Date Date: 03/19/2021 Start: 09:15 AM Study Location: Portable Technical Quality: Good visualization Indications:Cardiac arrest. Patient Status: Routine Weight: 143 pounds BP: 89/48 mmHg Allergies   - Other allergy:(Darvon). Conclusions  Summary  Mitral annular calcification is present. 1-2+ MR  Normal tricuspid valve structure and function. 1+ TR  RVSP 31 mmHg  severe CLVH  LVEF 50%  E/A flow reversal noted. Suggestive of diastolic dysfunction. Signature  ----------------------------------------------------------------  Electronically signed by Fermin Jean MD(Interpreting  physician) on 03/19/2021 02:13 PM  ----------------------------------------------------------------  Findings Left Ventricle severe [facility] LVEF 50% E/A flow reversal noted. Suggestive of diastolic dysfunction. Right Ventricle Normal right ventricle structure and function. Normal right ventricle systolic pressure. Left Atrium Moderately dilated left atrium. Right Atrium Normal right atrium. Mitral Valve Mitral annular calcification is present.  1-2+ MR Tricuspid Valve Normal tricuspid valve structure and function. 1+ TR RVSP 31 mmHg Aortic Valve Normal aortic valve structure and function. Pulmonic Valve The pulmonic valve was not well visualized . Pericardial Effusion No evidence of pericardial effusion. Pleural Effusion No evidence of pleural effusion. Aorta \ Miscellaneous The aorta is within normal limits. M-Mode Measurements (cm)  LVIDd: 4.01 cm                         LVIDs: 1.85 cm  IVSd: 1.45 cm                          IVSs: 1.23 cm  LVPWd: 1.32 cm                         LVPWs: 1.61 cm  Rt. Vent.  Dimension: 1.07 cm           AO Root Dimension: 2.8 cm                                         ACS: 1.47 cm                                         LA: 3.04 cm                                         LVOT: 1.98 cm Doppler Measurements:  AV Velocity:0.01 m/s                   MV Peak E-Wave: 0.96 m/s  AV Peak Gradient: 7.73 mmHg            MV Peak A-Wave: 1.58 m/s  AV Mean Gradient: 3.01 mmHg  AV Area (Continuity):1.27 cm^2         MV P1/2t: 105.6 msec  TR Velocity:2.29 m/s                   MVA by PHT2.08 cm^2  TR Gradient:21.01 mmHg                 Estimated RAP:10 mmHg                                         RVSP:31 mmHg Valves  Mitral Valve  Peak E-Wave: 0.96 m/s          Peak A-Wave: 1.58 m/s  P1/2t: 105.6 msec              E/A Ratio: 0.61                                 Peak Gradient: 3.7 mmHg  Area (PHT): 2.08 cm^2          Deceleration Time: 349.6 msec  MR Velocity: 4.66 m/s  Aortic Valve  Peak Velocity: 1.39 m/s                Mean Velocity: 0.79 m/s  Peak Gradient: 7.73 mmHg               Mean Gradient: 3.01 mmHg  Area (continuity): 1.27 cm^2  AV VTI: 28.28 cm  Cusp Separation: 1.47 cm  Tricuspid Valve  Estimated RVSP: 31 mmHg              Estimated RAP: 10 mmHg  TR Velocity: 2.29 m/s                TR Gradient: 21.01 mmHg  Pulmonic Valve  Peak Velocity: 0.68 m/s           Peak Gradient: 1.86 mmHg                                    Estimated PASP: 31.01 mmHg  LVOT  Peak Velocity: 0.72 m/s              Mean Velocity: 0.41 m/s  Peak Gradient: 2 mmHg                Mean Gradient: 0.9 mmHg  LVOT Diameter: 1.98 cm               LVOT VTI: 11.64 cm Structures  Left Atrium  LA Dimension: 3.04 cm                 LA Area: 18.08 cm^2  LA/Aorta: 1.09  LA Volume/Index: 49.97 ml  Left Ventricle  Diastolic Dimension: 9.58 cm         Systolic Dimension: 0.39 cm  Septum Diastolic: 8.24 cm            Septum Systolic: 3.09 cm  PW Diastolic: 6.49 cm                PW Systolic: 5.33 cm                                       FS: 53.9 %  LV EDV/LV EDV Index: 70.29 ml        LV ESV/LV ESV Index: 10.47 ml  EF Calculated: 85.1 %  LVOT Diameter: 1.98 cm  Right Ventricle  Diastolic Dimension: 9.33 cm                                    RV Systolic Pressure: 20.67 mmHg Aorta/ Miscellaneous Aorta  Aortic Root: 2.8 cm  LVOT Diameter: 1.98 cm    Ct Head Wo Contrast    Result Date: 3/20/2021  CT Brain. Contrast medium:  without contrast.. History:  Cardiac arrest. Technical factors: CT imaging of the brain was obtained and formatted as 5 mm contiguous axial images. 2.5 mm contiguous axial images were obtained through the osseous structures. Sagittal and coronal reconstruction obtained during postprocessing. Comparison:  CT brain, October 9, 2020, October 7, 2020. Lawernce Roughen Findings: Extra-axial spaces:  Normal. Intracranial hemorrhage:  None. Ventricular system: Ventricles mildly enlarged. Sulci mildly prominent. Basal Cisterns:  Normal. Cerebral Parenchyma: Bilateral symmetric periventricular areas decreased attenuation. 2 mm low-attenuation area exerting no mass effect, left thalamus. Midline Shift:  None. Cerebellum:  Normal. Paranasal sinuses and mastoid air cells: Coastal thickening, air-fluid levels, bilateral ethmoid and sphenoid sinuses. Visualized Orbits:  Normal.     Impression: Mild cerebral atrophy. Chronic ischemic white matter disease. Remote left thalamic lacunar infarct. Bilateral ethmoid and sphenoid sinusitis.  All CT scans at this facility use dose modulation, iterative reconstruction, and/or weight based dosing when appropriate to reduce radiation dose to as low as reasonably achievable. Xr Chest Portable    Result Date: 3/25/2021  EXAMINATION: XR CHEST PORTABLE CLINICAL HISTORY: RESPIRATORY FAILURE COMPARISONS: MARCH 22, 2021, MARCH 20, 2021. FINDINGS: Tip of endotracheal tube 1.4 cm superior to mojgan. Nasogastric tube courses beneath diaphragm osseous structures intact. Cardiopericardial silhouette normal. Aorta calcified. Blunting left costophrenic angle again identified. Mild ill-defined increased opacity lower lung zones. LEFT PLEURAL EFFUSION. BILATERAL LOWER LUNG ATELECTASIS/PNEUMONIA. Xr Chest Portable    Result Date: 3/22/2021  Exam: XR CHEST PORTABLE History:  Post intubation Technique: AP portable view of the chest obtained. Comparison: Chest x-ray from March 20, 2021 Chest x-ray portable Findings: The patient is intubated, there is an NG tube coursing towards the stomach. The cardiac silhouette is at the upper limits of normal in size. There are increased pulmonary markings in the lung bases which may represent early infiltrate versus atelectasis. There is a left pleural effusion. Bones of the thorax appear intact. No radiographic evidence of acute intrathoracic process. There is an NG tube coursing into the stomach. There is a left pleural effusion and likely bibasilar atelectasis or infiltrate. Xr Chest Portable    Result Date: 3/20/2021  EXAMINATION: XR CHEST PORTABLE CLINICAL HISTORY: ENDOTRACHEAL TUBE PLACEMENT COMPARISONS: MARCH 19, 2021 FINDINGS: The tracheal tube identified within right mainstem bronchus. Osseous structures intact. Cardiopericardial silhouette normal. Previously visualized ill-defined area increased opacity within the right lung base resolved. Interval development of increased opacity left lower and left midlung. CHEST ENDOTRACHEAL TUBE IN RIGHT MAINSTEM BRONCHUS. RECOMMENDING RETRACTING ENDOTRACHEAL TUBE 7 CM. INTERVAL DEVELOPMENT OF LEFT MID AND LEFT LOWER LUNG ATELECTASIS. INTERVAL RESOLUTION OF RIGHT LOWER LUNG ATELECTASIS. ABOVE FINDINGS DISCUSSED WITH PATIENT'S NURSE IGNACIO, VIA TELEPHONE, IN THE ICU, AT [facility], [address] [place] [address], MARCH 20, 2021. Xr Chest Portable    Result Date: 3/20/2021  EXAMINATION: XR CHEST PORTABLE CLINICAL HISTORY: INTUBATION COMPARISONS: MARCH 4, 2021, 6872 HOURS FINDINGS: Endotracheal tube tip is withdrawn from right mainstem bronchus and now lies 8 mm superior to mojgan. Osseous structures intact. Cardiopericardial silhouette normal. Aorta calcified. Ill-defined area increased opacity left lung base again identified, slightly. Right lung clear. INTERVAL RETRACTION OF THE ENDOTRACHEAL TUBE IN THE TRACHEA. CONSIDER RETRACTING ENDOTRACHEAL TUBE 1 CM. LEFT LOWER LUNG ATELECTASIS. Xr Chest Portable    Result Date: 3/19/2021  EXAMINATION: XR CHEST PORTABLE CLINICAL HISTORY: INTUBATION COMPARISONS: OCTOBER 9, 2020 FINDINGS: Endotracheal tube has migrated 3 cm inferiorly, just lying cephalad to mojgan. Nasogastric tube courses beneath diaphragm. Osseous structures intact. Cardiopericardial silhouette normal. Aorta calcified. Ill-defined area increased opacity right  lung base. RIGHT LOWER LUNG SUBSEGMENTAL ATELECTASIS/PNEUMONIA. INTERVAL INFERIOR MIGRATION ENDOTRACHEAL TUBE 3 CM WITH TIP NOW JUST PROXIMAL TO MOJGAN. MAY WISH TO RETRACT ENDOTRACHEAL TUBE 2 CM. Xr Chest Portable    Result Date: 3/19/2021  EXAMINATION: XR CHEST PORTABLE CLINICAL HISTORY: RESPIRATORY FAILURE COMPARISONS: MARCH 16, 2021 FINDINGS: Endotracheal tube tip 2.3 cm superior to mojgan. Nasogastric tube courses beneath diaphragm area osseous structures intact. Ill-defined areas increase opacity at lung bases. Cardiopericardial silhouette normal.     BIBASILAR SUBSEGMENTAL ATELECTASIS/PNEUMONIA IN THIS PARTIALLY EXPIRATORY CHEST RADIOGRAPH.     IMPRESSION AND SUGGESTION:  1. Acute hypoxic respiratory failure due to cardiac arrest, status post extubation  2. Acute on chronic kidney injury  3. Shock, resolved  4. Diabetes mellitus  5. ESBL UTI    Patient is extubated. On 2 L O2 via nasal cannula. Patient has upper airway wheezing. Refusing oral care and suctioning. Continue IV steroid. Continue IV antibiotic. We will keep her in ICU today and possible transfer out of ICU if continue to improve    I spent more than 35 min with this patient's care , greater the 50% of this time was spent in counseling and/or coordinating of care. NOTE: This report was transcribed using voice recognition software. Every effort was made to ensure accuracy; however, inadvertent computerized transcription errors may be present.       Electronically signed by Maddy Hopper MD, FCCP on 3/27/2021 at 11:13 AM

## 2021-03-27 NOTE — FLOWSHEET NOTE
Shift summary-    Patient on 2 L NC. O2 sats good all shift. Over shift patient started off with weak cough. Cough became stronger. Patient began swallowing and clearing secretions. Patient passed swallow eval with SLP. Honey thick liquids and puree diet. Ate 50% of lunch and 25% of dinner. Sugars up over the shift- Dr Veena Alex aware. Coverage ordered. . Dr Veena Alex aware. Orders received. Patient had 3 BMs. Patient alert and oriented. Taking. Answering questions appropriately. Moving all extremities. Worked with PT OT. HTN meds adjusted with Dr [facility] Lennon. [facility] Meryl Boas, patient has outdated IVs but patient is a tough stick and is refusing new IVs. Dr Meryl Boas aware. Told as long as they are functioning to leave them in. No further needs.

## 2021-03-27 NOTE — PROGRESS NOTES
Shift summary:     handoff of care at bedside. Patient skin checked for integrity. guaman draining yellow urine. Patient noted to have excessive drooling present. Patient resists suctioning whenever attempts are made. Patient tracks with her eyes occassionally. Patient is unable to communicate or follow commands at this time. Patient remains on an insulin drip with hourly glucose readings. Patient turned q 2 hours and prn.  0300 patient attemtring to speak saying \"mama\". Dr. Jeri Doll on floor, discussed with physician about the patient's continuous decreasing blood sugars on an insulin drip. Physician changed accu-checks to q 4 hours.

## 2021-03-27 NOTE — PROGRESS NOTES
Progress Note  Patient: Brent Paredes  Unit/Bed: IC05/IC05-01  YOB: 1945  MRN: [id number]  Acct: [de-identified]   Admitting Diagnosis: Cardiac arrest [facility]) [I46.9]  Admit Date:  3/18/2021  Hospital Day: 9    Chief Complaint: CPA    Histories:  Past Medical History:   Diagnosis Date    Anxiety     Asthma     dx 2019 / has smoked since age 14    CHF (congestive heart failure) ([facility])     Chronic back pain     Bilateral L5 S1 Radic on emg--surprisingly worse on the left than the right--pt's symptoms and her MRI show worse on the right    Chronic obstructive pulmonary disease with acute exacerbation ([facility] 10/12/2019    Depression     Fibromyalgia     Hyperlipidemia     meds > 8 yrs    Hypertension     meds > 45 yrs    On home O2     2l per n/c at bedtime mostly,     Osteoarthritis     Type II diabetes mellitus, uncontrolled ([facility]     hx > 8 yrs    Unspecified sleep apnea      Past Surgical History:   Procedure Laterality Date    BACK SURGERY  2017    lumbar disc    CARDIAC CATHETERIZATION  11/3/14     DR. MIRELES / no stents    COLONOSCOPY  08/29/2016    w/polypectomy     COLONOSCOPY N/A 9/29/2020    COLONOSCOPY WITH POLYPECTOMY performed by Caitlin Cadet MD at [facility] N/A 10/7/2019    EUA HYSTEROSCOPY DILATATION AND CURETTAGE performed by Kelin Ruiz DO at [facility]. [address], COLON, DIAGNOSTIC      EYE SURGERY      [place] with IOL OU / [id number]    umbilical hernia repair    [place] ESOPHAGOGASTRODUODENOSCOPY TRANSORAL DIAGNOSTIC N/A 3/24/2017    EGD ESOPHAGOGASTRODUODENOSCOPY performed by Zuri Avina MD at [facility] 2/8/2018    negative findings    MO REVISE MEDIAN N/CARPAL TUNNEL SURG Left 6/5/2017    LEFT  CARPAL TUNNEL RELEASE performed by Aleks Crews MD at [facility],1+K/R,SBZCV N/A 2/8/2018    TONSILLECTOMY      as child Physically abused: No     Forced sexual activity: No   Other Topics Concern    None   Social History Narrative    Grew up in [place]     Lives With:  [id number] Spouse    Type of Home: House    Home Layout: Two level, Performs ADL's on one level    Home Access: Stairs to enter with rails    Entrance Stairs - Number of Steps: 4    Bathroom Shower/Tub: Tub/Shower unit, Doors    Bathroom Equipment: Shower chair, Grab bars in [address]: Rolling walker, Cane, Oxygen(uses her O2 very seldom)    ADL Assistance: Needs assistance    Homemaking Assistance: Needs assistance    Homemaking Responsibilities: No(spouse performs)    Ambulation Assistance: Independent    Transfer Assistance: Independent    Active : No    Occupation: Retired    Type of occupation: worked in [employer]: patient enjoys televsision       Subjective/HPI doing well post extubation. Hoarse. Thirsty. EKG:         Review of Systems:   Review of Systems   Constitutional: Negative. Negative for diaphoresis and fatigue. HENT: Positive for sore throat. Eyes: Negative. Respiratory: Negative. Negative for cough, chest tightness, shortness of breath, wheezing and stridor. Cardiovascular: Negative. Negative for chest pain, palpitations and leg swelling. Gastrointestinal: Negative. Negative for blood in stool and nausea. Genitourinary: Negative. Musculoskeletal: Negative. Skin: Negative. Neurological: Positive for weakness. Negative for dizziness, syncope and light-headedness. Hematological: Negative. Psychiatric/Behavioral: Negative. Physical Examination:    BP (!) 175/51   Pulse 101   Temp 98.5 °F (36.9 °C) (Bladder)   Resp 20   Ht 4' 11\" (1.499 m)   Wt 153 lb 14.1 oz (69.8 kg)   LMP  (LMP Unknown)   SpO2 100%   BMI 31.08 kg/m²    Physical Exam   Constitutional: She appears healthy. No distress.    HENT:   Normal cephalic and Atraumatic   Eyes: Pupils are equal, round, and reactive to light. Neck: Normal range of motion and thyroid normal. Neck supple. No JVD present. No neck adenopathy. No thyromegaly present. Cardiovascular: Normal rate, regular rhythm, intact distal pulses and normal pulses. Murmur heard. Pulmonary/Chest: Effort normal and breath sounds normal. She has no wheezes. She has no rales. She exhibits no tenderness. Abdominal: Soft. Bowel sounds are normal. There is no abdominal tenderness. Musculoskeletal: Normal range of motion. General: No tenderness or edema. Neurological: She is alert and oriented to person, place, and time. Skin: Skin is warm. No cyanosis. Nails show no clubbing.        LABS:  CBC:   Lab Results   Component Value Date    WBC 16.2 03/27/2021    RBC 3.67 03/27/2021    HGB 10.9 03/27/2021    HCT 33.5 03/27/2021    MCV 91.4 03/27/2021    MCH 29.7 03/27/2021    MCHC 32.5 03/27/2021    RDW 17.3 03/27/2021     03/27/2021    MPV 8.8 08/01/2015     CBC with Differential:    Lab Results   Component Value Date    WBC 16.2 03/27/2021    RBC 3.67 03/27/2021    HGB 10.9 03/27/2021    HCT 33.5 03/27/2021     03/27/2021    MCV 91.4 03/27/2021    MCH 29.7 03/27/2021    MCHC 32.5 03/27/2021    RDW 17.3 03/27/2021    NRBC 1 08/28/2020    LYMPHOPCT 13.7 03/27/2021    MONOPCT 9.8 03/27/2021    BASOPCT 0.4 03/27/2021    MONOSABS 1.6 03/27/2021    LYMPHSABS 2.2 03/27/2021    EOSABS 0.0 03/27/2021    BASOSABS 0.1 03/27/2021     CMP:    Lab Results   Component Value Date     03/27/2021    K 5.1 03/27/2021    K 4.4 10/19/2020     03/27/2021    CO2 19 03/27/2021    BUN 60 03/27/2021    CREATININE 1.48 03/27/2021    GFRAA 41.4 03/27/2021    LABGLOM 34.2 03/27/2021    GLUCOSE 85 03/27/2021    PROT 6.3 03/27/2021    LABALBU 2.6 03/27/2021    CALCIUM 9.6 03/27/2021    BILITOT <0.2 03/27/2021    ALKPHOS 100 03/27/2021    AST 26 03/27/2021    ALT 31 03/27/2021     BMP:    Lab Results   Component Value Date     03/27/2021    K 5.1 03/27/2021    K 4.4 10/19/2020     03/27/2021    CO2 19 03/27/2021    BUN 60 03/27/2021    LABALBU 2.6 03/27/2021    CREATININE 1.48 03/27/2021    CALCIUM 9.6 03/27/2021    GFRAA 41.4 03/27/2021    LABGLOM 34.2 03/27/2021    GLUCOSE 85 03/27/2021     Magnesium:    Lab Results   Component Value Date    MG 2.6 03/27/2021     Troponin:    Lab Results   Component Value Date    TROPONINI 0.056 03/19/2021        Active Hospital Problems    Diagnosis Date Noted    Acute respiratory failure with hypoxia ([facility]) [J96.01]      Priority: Low    Cardiac arrest ([facility] [I46.9] 03/18/2021     Priority: Low        Assessment/Plan:  1. CPA   2. Respiratory failure-extubated. Stable  3. Encephalopathy- not following commands despite being off Sedation. 4. CAD - LAD mild  5. HTN- uncontrolled- add Verapamil for both BP and Tachy  6. LVEF 55%  7. Echo Severe LVH. 1-2+ MR RVSP 31 mmHg. LVEF 50%  8. History of HCM - need to Keep HR close to 60s. continue iv BB. And  Nitrates   9. SAÚL - Nephrology following- stable   10. Accelerated HTN- adjust meds.        Electronically signed by Padilla Bates MD on 3/27/2021 at 1:04 PM

## 2021-03-27 NOTE — PROGRESS NOTES
[facility] OCCUPATIONAL THERAPY EVALUATION - ACUTE     NAME: Jessica Gutierrez  : 1945 (67 y.o.)  MRN: [id number]  CODE STATUS: Limited  Room: [place] [address]-01    Date of Service: 3/27/2021    Patient Diagnosis(es): Cardiac arrest [facility]) [I46.9]    Chief Complaint   Patient presents with    Respiratory Distress     Patient Active Problem List    Diagnosis Date Noted    Cardiopulmonary arrest [facility])      Priority: High    Lumbosacral radiculopathy at L5 2015     Priority: High     Class: Acute    Spinal stenosis of lumbar region with neurogenic claudication 2015     Priority: High     Class: Chronic    High risk medication use-Norco - 17 OARRS PM&R, 18 OARRS PM&R, 18 OARRS PM&R, Urine Drug screen negative 17 PM&R--MED CONTRACT 2014     Priority: High    Acute respiratory failure with hypoxia (HCC)     Cardiac arrest ([address] 2021    Gait abnormality 10/14/2020    Late effects of CVA (cerebrovascular accident) 10/14/2020    Sepsis ([address] 10/06/2020    Major depressive disorder in remission ([address] 10/06/2020    Gastroesophageal reflux disease without esophagitis 10/06/2020    Acute cystitis without hematuria 10/06/2020    CKD (chronic kidney disease) stage 4, GFR 15-29 ml/min ([address] 10/06/2020    Adenomatous polyp of sigmoid colon     Adenomatous polyp of transverse colon     Acute encephalopathy     Flash pulmonary edema ([address] 2020    Acute on chronic kidney failure ([address] 2020    Dysarthria     Chronic fatigue     Symptomatic anemia 2020    COPD exacerbation ([address] 2020    Anemia     AVM (arteriovenous malformation)     Gastrointestinal hemorrhage 2020    HOCM (hypertrophic obstructive cardiomyopathy) ([address] 2019    Hypoglycemia     SAÚL (acute kidney injury) ([address] 10/23/2019    Acute on chronic diastolic (congestive) heart failure ([address] 10/13/2019    Chronic obstructive pulmonary disease with acute exacerbation ([address] 10/12/2019    Hypertensive urgency 10/09/2019    Uncontrolled type 2 diabetes mellitus with hyperglycemia ([facility])     Acute on chronic diastolic heart failure ([address] 10/08/2019    CHF (congestive heart failure) ([facility])     PMB (postmenopausal bleeding)     Acute combined systolic and diastolic CHF, NYHA class 1 ([address] 03/24/2019    Osteoarthritis of spine with radiculopathy, lumbar region 11/07/2018    Myalgia 05/11/2018    Intercostal neuropathy 05/11/2018    Diabetic asymmetric polyneuropathy ([address] 04/11/2017    Cervical radicular pain 12/03/2016    Reactive depression 07/15/2016    Diabetic radiculopathy ([address] 07/15/2016    Insomnia secondary to chronic pain 04/15/2016    Non-compliant patient NO showed FU 1/10/17 Dr Nirmala Evans 09/24/2015    Impaired mobility and activities of daily living 03/28/2015    Neck pain 03/04/2015    Artificial lens present 10/03/2014    Presbyopia 10/03/2014    Astigmatism, regular 10/03/2014    Cataract, nuclear sclerotic senile 10/03/2014    IDDM (insulin dependent diabetes mellitus) 10/03/2014    Regular astigmatism 10/03/2014    Nuclear senile cataract 10/03/2014    Memory deficit 06/04/2014    SOB (shortness of breath) on exertion 03/14/2014    Chest pain 03/14/2014    CTS (carpal tunnel syndrome) 02/09/2014    DJD (degenerative joint disease), lumbar 02/08/2014    Lumbosacral radiculopathy at S1 02/08/2014    DDD (degenerative disc disease), lumbar 02/08/2014    Dysphonia 02/08/2014    Insomnia 12/04/2013    Smoker 06/18/2013    Hypertension     Hyperlipidemia     Uncontrolled type 2 diabetes mellitus with complication, without long-term current use of insulin ([facility])     Fibromyalgia     Anxiety         Past Medical History:   Diagnosis Date    Anxiety     Asthma     dx 2019 / has smoked since age 14    CHF (congestive heart failure) ([facility])     Chronic back pain     Bilateral L5 S1 Radic on emg--surprisingly worse on the left than the right--pt's symptoms and her MRI show worse on the right    Chronic obstructive pulmonary disease with acute exacerbation ([facility] 10/12/2019    Depression     Fibromyalgia     Hyperlipidemia     meds > 8 yrs    Hypertension     meds > 45 yrs    On home O2     2l per n/c at bedtime mostly,     Osteoarthritis     Type II diabetes mellitus, uncontrolled ([address]     hx > 8 yrs    Unspecified sleep apnea      Past Surgical History:   Procedure Laterality Date    BACK SURGERY  2017    lumbar disc    CARDIAC CATHETERIZATION  11/3/14     DR. MIRELES / no stents    COLONOSCOPY  08/29/2016    w/polypectomy     COLONOSCOPY N/A 9/29/2020    COLONOSCOPY WITH POLYPECTOMY performed by Naresh Khan MD at [facility] N/A 10/7/2019    EUA HYSTEROSCOPY DILATATION AND CURETTAGE performed by Deena Storm DO at [facility]. [address], COLON, DIAGNOSTIC      EYE SURGERY      [place] with IOL OU / [address]  [address]    umbilical hernia repair    [place] ESOPHAGOGASTRODUODENOSCOPY TRANSORAL DIAGNOSTIC N/A 3/24/2017    EGD ESOPHAGOGASTRODUODENOSCOPY performed by Justa Espitia MD at [place] [address] 2/8/2018    negative findings    [place] REVISE MEDIAN N/CARPAL TUNNEL SURG Left 6/5/2017    LEFT  CARPAL TUNNEL RELEASE performed by [facility]ir Persaud [facility] at [facility],1+R/C,PRACF N/A 2/8/2018    TONSILLECTOMY      as child    UPPER GASTROINTESTINAL ENDOSCOPY  08/26/2016    w/bx     UPPER GASTROINTESTINAL ENDOSCOPY N/A 2/25/2020    EGD possible biopsy performed by Naresh Khan MD at [address] [place] [address] 7/1/2020    EGD PUSH ENTEROSCOPY performed by Skyler Mansfield MD at [facility]        Restrictions  Restrictions/Precautions: Contact Precautions, Fall Risk     Safety Devices: Safety Devices  Safety Devices in place: Yes  Type of devices: Left in bed, Call light within reach        Subjective  Pre Treatment Pain Screening  Pain at present: 0  Comments / Details: no signs or symptoms of pain    Pain Reassessment:   Pain Assessment  Pain Level: 0       Prior Level of Function:  Social/Functional History  Lives With: Spouse, Son  Type of Home: House  Home Layout: Two level(Patient has a stair glide available to get to the second floor where she usually stays. Has a small refrigerator on that floor)  Home Access: (1 step then a landing and then 4 steps to get to the kitchen area. Front door has 6-7 steps)  Bathroom Shower/Tub: Curtain, Tub/Shower unit( would like to replace the tub with a walkin shower)  Bathroom Equipment: Grab bars in shower, Shower chair, Hand-held shower  Home Equipment: Lizbeth Tucker, 4 wheeled walker  Receives Help From: Family  ADL Assistance: (has been declining in ability to perform self care and has been receiving assist from family)  Ambulation Assistance: Independent(cane at times but usually walks without device)  Active : No  IADL Comments:  was present and provided information. he reported that patient has had many complaints of being tired all the time and they have been wondering why  Additional Comments: Patient usually get up on her own and takes her meds and then returns to sleep for a few hours after her meds make her sleepy.  Patient has a specialized machine that takes her blood sugar, blood pressure and her weight and sends it by Ipad to another location and then a nurse assesses the information and calls the patient    OBJECTIVE:     Orientation Status:  Orientation  Overall Orientation Status: (Patient was non vocal during the eval and orientation was unable to be assessed)    Observation:   Patient had decreased ability to participate at times but did try when she was able to be alert    Cognition Status:  Cognition  Cognition Comment: Patient was able to follow very simple commands such as \"look at the gabriela on your left side\" inconsistently. Patient then slightly turned her head to midline but had difficulty moving her head past midline    Perception Status:  Perception  Overall Perceptual Status: Impaired  Unilateral Attention: Cues to attend to left side of body, Cues to attend left visual field    Sensation Status:  Sensation  Overall Sensation Status: (Unable to assess.)    Vision and Hearing Status:  Vision  Vision: (Patient was noted to open her eyes only occasionally during the evaluation)     ROM:   LUE PROM (degrees)  LUE PROM: WFL  LUE AROM (degrees)  LUE General AROM: No AROM noted  Left Hand PROM (degrees)  Left Hand PROM: WFL  Left Hand AROM (degrees)  Left Hand General AROM: No AROM noted  RUE PROM (degrees)  RUE PROM: WFL  RUE AROM (degrees)  RUE General AROM: No AROM noted  Right Hand PROM (degrees)  Right Hand PROM: WFL  Right Hand AROM (degrees)  Right Hand General AROM: No AROM noted    Strength:  LUE Strength  L Hand General: 0/5  LUE Strength Comment: no AROM noted on eval 0/5 strength  RUE Strength  R Hand General: 0/5  RUE Strength Comment: no AROM noted on eval 0/5 strength    Coordination, Tone, Quality of Movement: Tone RUE  RUE Tone: Hypotonic(slightly hypotonic)  Tone LUE  LUE Tone: Hypotonic(slightly hypotonic)  Coordination  Coordination and Movement description: Fine motor impairments, Gross motor impairments, Right UE, Left UE    Hand Dominance:  Hand Dominance  Hand Dominance: Right    ADL Status:  ADL  Feeding: NPO  Grooming: Dependent/Total  UE Bathing: Dependent/Total  LE Bathing: Dependent/Total  UE Dressing: Dependent/Total  LE Dressing: Dependent/Total  Toileting: Dependent/Total  Additional Comments: All areas of self care are assessed to be dependent as patient was not noted to have any AROM of B UE's. patient was dependent for hand over hand washing her face.   Toilet Transfers  Toilet Transfer: Unable to assess  Toilet Transfers Comments: unsafe at this time due to decreased ability to remain awake and follow commands  Tub Transfers  Tub Transfers: Not tested    Therapy key for assistance levels    Independent = Pt. is able to perform task with no assistance but may require a device   Stand by assistance = Pt. does not perform task at an independent level but does not need physical assistance, requires verbal cues  Minimal, Moderate, Maximal Assistance = Pt. requires physical assistance (25%, 50%, 75% assist from helper) for task but is able to actively participate in task   Dependent = Pt. requires total assistance with task and is not able to actively participate with task completion     Functional Mobility:          Bed Mobility  Bed mobility  Supine to Sit: Unable to assess  Sit to Supine: Unable to assess    Seated and Standing Balance:  Balance  Sitting Balance: Unable to assess(comment)  Standing Balance: Unable to assess(comment)    Functional Endurance:  Activity Tolerance  Activity Tolerance: Treatment limited secondary to medical complications (free text), Patient limited by fatigue    D/C Recommendations:  OT D/C RECOMMENDATIONS  REQUIRES OT FOLLOW UP: Yes    Equipment Recommendations:  OT Equipment Recommendations  Other: to be assessed    OT Education:   OT Education  Patient Education: Educated  and patient about role of OT    OT Follow Up:  OT D/C RECOMMENDATIONS  REQUIRES OT FOLLOW UP: Yes       Assessment/Discharge Disposition:  Assessment: Patient is a 67year old female from home with reported decline in self care and endurance. Patient presents to the hospital and is in the ICU with great limitations in self care independence and ability to move.  Patient will benefit from Occupational Therapy to address self care limitations  Performance deficits / Impairments: Decreased functional mobility , Decreased strength, Decreased vision/visual deficit, Decreased coordination, Decreased endurance, Decreased ADL status, Decreased ROM, Decreased cognition, Decreased balance, Decreased fine motor control  Prognosis: Guarded  Discharge Recommendations: Continue to assess pending progress  Decision Making: High Complexity  History: required intensive chart review  Exam: 10 areas of deficit with possibility of adding additional as more assessment is able to be completed  Assistance / Modification: Max modification    Six Click Score   How much help for putting on and taking off regular lower body clothing?: Total  How much help for Bathing?: Total  How much help for Toileting?: Total  How much help for putting on and taking off regular upper body clothing?: Total  How much help for taking care of personal grooming?: Total  How much help for eating meals?: Total  AM-PAC Inpatient Daily Activity Raw Score: 6  AM-PAC Inpatient ADL T-Scale Score : 17.07  ADL Inpatient CMS 0-100% Score: 100    Plan:  Plan  Times per week: 1-4  Plan weeks: length of acute stay  Current Treatment Recommendations: Strengthening, Patient/Caregiver Education & Training, ROM, Endurance Training, Equipment Evaluation, Education, & procurement, Cognitive/Perceptual Training, Balance Training, Neuromuscular Re-education, Safety Education & Training, Self-Care / ADL    Goals:   Patient will:    - Improve functional endurance to tolerate/complete 15 mins of ADL's  - Be mod assist in UB ADLs   - Be mod assist in ADL transfers without LOB  - Improve B UE Function (AROM, strength, motor control, tone normalization) to complete ADLs as projected. - Access appropriate D/C site with as few architectural barriers as possible. - Sequence self-care tasks with min assist    Patient Goal: Patient goals : Patient was non vocal during the eval,  would like her to be provided with exercises for patient to do at home so she can get stronger and be able to do more.      Discussed and agreed upon: Yes Comments: with     Therapy Time:   OT Individual Minutes  Time In: 1020  Time Out: 1050  Minutes: 30    Eval: 30 minutes     Electronically signed by:    JABIER Hector/L  3/27/2021, 10:56 AM

## 2021-03-27 NOTE — PROGRESS NOTES
Physical Therapy Med Surg Initial Assessment  Facility/Department: [facility]  Room: [place] [address]       NAME: Alesha Stevens  : 1945 (80 y.o.)  MRN: [id number]  CODE STATUS: Limited    Date of Service: 3/27/2021    Patient Diagnosis(es): Cardiac arrest [facility]) [I46.9]   Chief Complaint   Patient presents with    Respiratory Distress     Patient Active Problem List    Diagnosis Date Noted    Cardiopulmonary arrest [facility])      Priority: High    Lumbosacral radiculopathy at L5 2015     Priority: High     Class: Acute    Spinal stenosis of lumbar region with neurogenic claudication 2015     Priority: High     Class: Chronic    High risk medication use-Norco - 17 OARRS PM&R, 18 OARRS PM&R, 18 OARRS PM&R, Urine Drug screen negative 17 PM&R--MED CONTRACT 2014     Priority: High    Acute respiratory failure with hypoxia (HCC)     Cardiac arrest ([address] 2021    Gait abnormality 10/14/2020    Late effects of CVA (cerebrovascular accident) 10/14/2020    Sepsis ([address] 10/06/2020    Major depressive disorder in remission ([address] 10/06/2020    Gastroesophageal reflux disease without esophagitis 10/06/2020    Acute cystitis without hematuria 10/06/2020    CKD (chronic kidney disease) stage 4, GFR 15-29 ml/min ([address] 10/06/2020    Adenomatous polyp of sigmoid colon     Adenomatous polyp of transverse colon     Acute encephalopathy     Flash pulmonary edema ([address] 2020    Acute on chronic kidney failure ([address] 2020    Dysarthria     Chronic fatigue     Symptomatic anemia 2020    COPD exacerbation ([address] 2020    Anemia     AVM (arteriovenous malformation)     Gastrointestinal hemorrhage 2020    HOCM (hypertrophic obstructive cardiomyopathy) ([address] 2019    Hypoglycemia     SAÚL (acute kidney injury) ([address] 10/23/2019    Acute on chronic diastolic (congestive) heart failure ([address] 10/13/2019    Chronic obstructive pulmonary disease with acute exacerbation ([facility] 10/12/2019    Hypertensive urgency 10/09/2019    Uncontrolled type 2 diabetes mellitus with hyperglycemia ([facility])     Acute on chronic diastolic heart failure ([address] 10/08/2019    CHF (congestive heart failure) ([facility])     PMB (postmenopausal bleeding)     Acute combined systolic and diastolic CHF, NYHA class 1 ([facility] 03/24/2019    Osteoarthritis of spine with radiculopathy, lumbar region 11/07/2018    Myalgia 05/11/2018    Intercostal neuropathy 05/11/2018    Diabetic asymmetric polyneuropathy ([facility] 04/11/2017    Cervical radicular pain 12/03/2016    Reactive depression 07/15/2016    Diabetic radiculopathy ([facility] 07/15/2016    Insomnia secondary to chronic pain 04/15/2016    Non-compliant patient NO showed FU 1/10/17 Dr Beverley Fink 09/24/2015    Impaired mobility and activities of daily living 03/28/2015    Neck pain 03/04/2015    Artificial lens present 10/03/2014    Presbyopia 10/03/2014    Astigmatism, regular 10/03/2014    Cataract, nuclear sclerotic senile 10/03/2014    IDDM (insulin dependent diabetes mellitus) 10/03/2014    Regular astigmatism 10/03/2014    Nuclear senile cataract 10/03/2014    Memory deficit 06/04/2014    SOB (shortness of breath) on exertion 03/14/2014    Chest pain 03/14/2014    CTS (carpal tunnel syndrome) 02/09/2014    DJD (degenerative joint disease), lumbar 02/08/2014    Lumbosacral radiculopathy at S1 02/08/2014    DDD (degenerative disc disease), lumbar 02/08/2014    Dysphonia 02/08/2014    Insomnia 12/04/2013    Smoker 06/18/2013    Hypertension     Hyperlipidemia     Uncontrolled type 2 diabetes mellitus with complication, without long-term current use of insulin ([facility])     Fibromyalgia     Anxiety         Past Medical History:   Diagnosis Date    Anxiety     Asthma     dx 2019 / has smoked since age 11    CHF (congestive heart failure) ([facility])     Chronic back pain     Bilateral L5 S1 Radic on emg--surprisingly worse on the left than the right--pt's symptoms and her MRI show worse on the right    Chronic obstructive pulmonary disease with acute exacerbation ([facility] 10/12/2019    Depression     Fibromyalgia     Hyperlipidemia     meds > 8 yrs    Hypertension     meds > 45 yrs    On home O2     2l per n/c at bedtime mostly,     Osteoarthritis     Type II diabetes mellitus, uncontrolled ([facility]     hx > 8 yrs    Unspecified sleep apnea      Past Surgical History:   Procedure Laterality Date    BACK SURGERY  2017    lumbar disc    CARDIAC CATHETERIZATION  11/3/14     DR. MIRELES / no stents    COLONOSCOPY  08/29/2016    w/polypectomy     COLONOSCOPY N/A 9/29/2020    COLONOSCOPY WITH POLYPECTOMY performed by Con Dowell MD at [facility] N/A 10/7/2019    EUA HYSTEROSCOPY DILATATION AND CURETTAGE performed by Yosi Butcher DO at [facility]. [address], COLON, DIAGNOSTIC      EYE SURGERY      [place] with IOL OU / [address] [place]  [address]    umbilical hernia repair    NE ESOPHAGOGASTRODUODENOSCOPY TRANSORAL DIAGNOSTIC N/A 3/24/2017    EGD ESOPHAGOGASTRODUODENOSCOPY performed by Valeriy King MD at [place] [address] 2/8/2018    negative findings    NE REVISE MEDIAN N/CARPAL TUNNEL SURG Left 6/5/2017    LEFT  CARPAL TUNNEL RELEASE performed by Shani Zamora MD at [facility],4+W/X,PDTGZ N/A 2/8/2018    TONSILLECTOMY      as child    UPPER GASTROINTESTINAL ENDOSCOPY  08/26/2016    w/bx     UPPER GASTROINTESTINAL ENDOSCOPY N/A 2/25/2020    EGD possible biopsy performed by Con Dowell MD at [address] [place] [address] 7/1/2020    EGD PUSH ENTEROSCOPY performed by Sravan Mcintosh MD at [facility]       Chart Reviewed: Yes  Patient assessed for rehabilitation services?: Yes  Family / Caregiver Present: No  General Comment  Comments: Pt laying in General PROM: able to reach 90/90 position, knees WFL, ankle lacking DF to neutral only  LLE General AROM: limited by strength deficits    Strength:  Strength RLE  Comment: hip groslsy 2/5, knee 2+/5, ankle 2+/5  Strength LLE  Comment: hip groslsy 2/5, knee 2+/5, ankle 2+/5  Strength RUE  Comment: intact shoulder shrug, shoulder, elbow, wrist 2+/5  Strength LUE  Comment: intact shoulder shrug, shoulder, elbow, wrist 2+/5  Strength Other  Other: impaired core strength noted with mobility assessment    Neuro:  Balance  Sitting - Static: Poor(anticipated)  Sitting - Dynamic: Poor  Comments: poor trunk control noted     Motor Control  Gross Motor?: Exceptions  Comments: generalized weakness  Sensation  Overall Sensation Status: WFL(pt responds to light touch to all extremities)    Bed mobility  Rolling to Left: Dependent/Total  Rolling to Right: Dependent/Total  Supine to Sit: (deferred d/t need for 2 person assist and pt fatigued from therex)  Comment: vc for turning head and initiating core, assist with  UEs and LEs to initiate movement    Transfers  Comment: NT d/t safety concerns, recommend mechanical lift at this time for OOB activity    Ambulation  Ambulation?: No    Activity Tolerance  Activity Tolerance: Patient limited by fatigue    Exercises  Quad Sets: x10  Heelslides: x10  Gluteal Sets: attempted, pt unable to perform this date  Ankle Pumps: x10  Shoulder Active Range of Motion: shoulder shrugs x10  Comments: cervical flex/ext x10; rot L and R x10     PT Education  PT Education: Goals;PT Role;Plan of Care;Orientation    ASSESSMENT:   Body structures, Functions, Activity limitations: Decreased functional mobility ; Decreased strength;Decreased posture;Decreased balance;Decreased ROM; Decreased endurance  Decision Making: High Complexity  History: high  Exam: high  Clinical Presentation: high    Prognosis: Good  Barriers to Learning: cognitive deficits    DISCHARGE RECOMMENDATIONS:  Discharge Recommendations: Continue to assess pending progress, Patient would benefit from continued therapy after discharge    Assessment: Pt demonstrates the above deficits and decline in functional mobility as a result of prolonged immobility d/t cardiopulmonary arrest.Pt would benefit from physical therapy to address above deficits and allow for safe return home at highest level of function, decrease risk for falls, and improve QOL. REQUIRES PT FOLLOW UP: Yes      PLAN OF CARE:  Plan  Times per week: 3-6  Current Treatment Recommendations: Strengthening, Functional Mobility Training, Neuromuscular Re-education, Manual Therapy - Joint Manipulation, Home Exercise Program, Equipment Evaluation, Education, & procurement, Modalities, Safety Education & Training, Transfer Training, Gait Training, Balance Training, Cognitive Reorientation, Patient/Caregiver Education & Training, Positioning, ROM  Safety Devices  Type of devices: Call light within reach, Left in bed, Bed alarm in place, Nurse notified    Goals:  Patient goals : \"I want to get better\"  Long term goals  Long term goal 1: Bed mobility with Mod A  Long term goal 2: unsupported sitting with Min A x 8 min  Long term goal 3: functional reach in sitting <4 inch OOBOS  Long term goal 4: progress to functional transfers with 2 person Mod A  Long term goal 5: to be assessed for gait when appropriate    [facility] (6 CLICK) [id number] Mobility Raw Score : 6    Therapy Time:   Individual   Time In 1306   Time Out 1321   Minutes [address] [place], [place], 03/27/21 at 2:17 PM       Definitions for assistance levels  Independent = pt does not require any physical supervision or assistance from another person for activity completion. Device may be needed.   Stand by assistance = pt requires verbal cues or instructions from another person, close to but not touching, to perform the activity  Minimal assistance= pt performs 75% or more of the activity; assistance is required to complete the activity  Moderate assistance= pt performs 50% of the activity; assistance is required to complete the activity  Maximal assistance = pt performs 25% of the activity; assistance is required to complete the activity  Dependent = pt requires total physical assistance to accomplish the task

## 2021-03-27 NOTE — PROGRESS NOTES
Neurology Follow up    SUBJECTIVE: Patient is extubated and off sedation. She does awaken with name but does follow any commands, such as opening closing her eyes as well as show me her thumb on the right and left hands. .      Patient again appears to be lethargic but noted to follow some commands as stated above.     Current Facility-Administered Medications   Medication Dose Route Frequency Provider Last Rate Last Admin    insulin lispro (HUMALOG) injection vial 0-6 Units  0-6 Units Subcutaneous 6x Daily Sarahi Anderson MD   Stopped at 03/27/21 0758    dextrose 5% bolus 750 mL  750 mL Intravenous Once Ramy Valencia  mL/hr at 03/27/21 1153 250 mL at 03/27/21 1153    lidocaine 2 % injection 5 mL  5 mL Intradermal Once Magdalene Craft MD        sodium chloride flush 0.9 % injection 10 mL  10 mL Intravenous 2 times per day Magdalene Craft MD   10 mL at 03/27/21 1154    sodium chloride flush 0.9 % injection 10 mL  10 mL Intravenous PRN Magdalene Craft MD        0.9 % sodium chloride infusion  250 mL Intravenous Once Magdalene Craft MD        carvedilol (COREG) tablet 25 mg  25 mg Oral BID Magdalene Craft MD   Stopped at 03/26/21 2008    cloNIDine (CATAPRES) tablet 0.2 mg  0.2 mg Oral BID Magdalene Craft MD   Stopped at 03/26/21 2008    glucose (GLUTOSE) 40 % oral gel 15 g  15 g Oral PRN Magdalene Craft MD        dextrose 50 % IV solution  12.5 g Intravenous PRN Magdalene Craft MD        glucagon (rDNA) injection 1 mg  1 mg Intramuscular PRN Magdalene Craft MD        dextrose 5 % solution  100 mL/hr Intravenous PRN Magdalene rCaft MD        meropenem (MERREM) 1,000 mg in sodium chloride 0.9 % 100 mL IVPB (mini-bag)  1,000 mg Intravenous Q12H Irma Mcmahon  mL/hr at 03/27/21 1152 1,000 mg at 03/27/21 1152    0.9 % sodium chloride infusion   Intravenous PRN Ramy Valencia DO        nitroglycerin (NITRO-BID) 2 % ointment 1 inch  1 inch Topical 4 times per day Rajat Altman MD   1 inch at 03/27/21 1153    methylPREDNISolone sodium (SOLU-MEDROL) injection 40 mg  40 mg Intravenous Daily Jorge Berg MD   40 mg at 03/27/21 1153    pantoprazole (PROTONIX) injection 40 mg  40 mg Intravenous Daily Jorge Berg MD   40 mg at 03/27/21 1153    And    sodium chloride (PF) 0.9 % injection 10 mL  10 mL Intravenous Daily Jorge Berg MD   10 mL at 03/27/21 1154    heparin (porcine) injection 5,000 Units  5,000 Units Subcutaneous 3 times per day Jorge Berg MD   5,000 Units at 03/27/21 1155    dexmedetomidine (PRECEDEX) 400 mcg in sodium chloride 0.9 % 100 mL infusion  0.2-1.4 mcg/kg/hr Intravenous Continuous Jorge Berg MD   Stopped at 03/26/21 0900    docusate (COLACE) 50 MG/5ML liquid 100 mg  100 mg Oral BID Jorge Berg MD   Stopped at 03/26/21 2008    polyethylene glycol (GLYCOLAX) packet 17 g  17 g Oral Daily Jorge Berg MD   Stopped at 03/27/21 0803    budesonide (PULMICORT) nebulizer suspension 500 mcg  0.5 mg Nebulization BID Jorge Berg MD   500 mcg at 03/27/21 0440    hydrALAZINE (APRESOLINE) injection 10 mg  10 mg Intravenous Q4H PRN Bill Shoe Sedar, DO   10 mg at 03/26/21 2321    Or    hydrALAZINE (APRESOLINE) injection 20 mg  20 mg Intravenous Q4H PRN Harvey Shoe Sedar, DO   20 mg at 03/25/21 2051    labetalol (NORMODYNE;TRANDATE) injection 20 mg  20 mg Intravenous Q4H PRN Aleksander Combe, DO   20 mg at 03/24/21 1227    labetalol (NORMODYNE;TRANDATE) injection 40 mg  40 mg Intravenous Q4H PRN Aleksander Combe, DO   40 mg at 03/19/21 0827    promethazine (PHENERGAN) tablet 12.5 mg  12.5 mg Oral Q6H PRN Caio Agarwal MD        Or    ondansetron (ZOFRAN) injection 4 mg  4 mg Intravenous Q6H PRN Caio Agarwal MD        chlorhexidine (PERIDEX) 0.12 % solution 15 mL  15 mL Mouth/Throat BID Caio Agarwal MD   Stopped at 03/27/21 1151    sodium chloride flush 0.9 % injection 10 mL  10 mL Intravenous 2 times per day Caio Agarwal MD   10 mL at 03/27/21 1155    sodium chloride flush 0.9 % injection 10 mL  10 mL Intravenous PRN Charlie Schuler MD        acetaminophen (TYLENOL) tablet 650 mg  650 mg Oral Q6H PRN Charlie Schuler MD   650 mg at 03/25/21 2153    Or    acetaminophen (TYLENOL) suppository 650 mg  650 mg Rectal Q6H PRN Charlie Schuler MD   650 mg at 03/25/21 0153    ipratropium-albuterol (DUONEB) nebulizer solution 1 ampule  1 ampule Inhalation 4x daily Amy Chanel MD   1 ampule at 03/27/21 1115       PHYSICAL EXAM:    BP (!) 175/51   Pulse 101   Temp 98.5 °F (36.9 °C) (Bladder)   Resp 20   Ht 4' 11\" (1.499 m)   Wt 153 lb 14.1 oz (69.8 kg)   LMP  (LMP Unknown)   SpO2 100%   BMI 31.08 kg/m²   General Appearance:      Skin:  normal  CVS - Normal sounds, No murmurs , No carotid Bruits  RS -CTA  Abdomen Soft, BS present  Review of Systems   Unable to perform ROS: Mental status change   Constitutional: Negative for chills, fever and unexpected weight change. HENT: Positive for trouble swallowing and voice change. Negative for congestion. Eyes: Negative for photophobia and visual disturbance. Respiratory: Negative for chest tightness, shortness of breath and wheezing. Cardiovascular: Negative for chest pain and leg swelling. Gastrointestinal: Negative for diarrhea, nausea and vomiting. Endocrine: Negative. Genitourinary: Negative. Musculoskeletal: Negative for back pain, gait problem and neck stiffness. Skin: Negative for rash. Allergic/Immunologic: Negative. Neurological: Negative for dizziness, tremors, seizures, syncope, facial asymmetry, speech difficulty, weakness, light-headedness, numbness and headaches. Hematological: Negative. Psychiatric/Behavioral: Positive for confusion. Negative for hallucinations and sleep disturbance.       Mental Status Exam:             She is lethargic due to sedation  Funduscopic Exam:     Cranial Nerves            Cranial nerve III           Pupils:  equal, round, reactive to light Cranial nerves III, IV, VI           Extraocular Movements: intact        Motor: Unable to perform examination but she does withdraw all her extremities to pain            Sensory: Unable to perform        Pinprick             Right Upper Extremity:  normal             Left Upper Extremity:  normal             Right Lower Extremity:  normal             Left Lower Extremity:  normal           Vibration                         Touch            Proprioception                 Coordination:           Finger/Nose   Right:  normal              Left:  normal          Heel-Knee-Shin                Right:  normal              Left:  normal          Rapid Alternating Movements              Right:  normal              Left:  normal          Gait:                       Casual:  normal                         Romberg:  normal            Reflexes:             Deep Tendon Reflexes:             Reflexes are 2 +             Plantar response:                Right:  downgoing               Left:  downgoing    Vascular:  Cardiac Exam:  normal         Echocardiogram Complete 2d With Doppler With Color    Result Date: 3/19/2021  Transthoracic Echocardiography Report (TTE)  Demographics  Patient Name   Leticiarikatina Ahn  Gender              Female                 M  Patient Number [id number]        Race                Black                                 Ethnicity  Visit Number   [id number]       Room Number         IC05  Corporate ID                   Date of Study       03/19/2021  Accession      [id number]      Referring Physician  Number  Date of Birth  1945      Sonographer         Anabel Reyes LPN  Age            67 year(s)      Interpreting        [facility]) Cardiology                                 Physician           Mesfin Cruz MD Procedure Type of Study  TTE procedure:ECHO COMPLETE 2D W/DOP W/COLOR.  Procedure Date Date: 03/19/2021 Start: 09:15 AM Study Location: Portable Technical Quality: Good visualization Indications:Cardiac arrest. Patient Status: Routine Weight: 143 pounds BP: 89/48 mmHg Allergies   - Other allergy:(Darvon). Conclusions  Summary  Mitral annular calcification is present. 1-2+ MR  Normal tricuspid valve structure and function. 1+ TR  RVSP 31 mmHg  severe CLVH  LVEF 50%  E/A flow reversal noted. Suggestive of diastolic dysfunction. Signature  ----------------------------------------------------------------  Electronically signed by Mary Esparza MD(Interpreting  physician) on 03/19/2021 02:13 PM  ----------------------------------------------------------------  Findings Left Ventricle severe CLVH LVEF 50% E/A flow reversal noted. Suggestive of diastolic dysfunction. Right Ventricle Normal right ventricle structure and function. Normal right ventricle systolic pressure. Left Atrium Moderately dilated left atrium. Right Atrium Normal right atrium. Mitral Valve Mitral annular calcification is present. 1-2+ MR Tricuspid Valve Normal tricuspid valve structure and function. 1+ TR RVSP 31 mmHg Aortic Valve Normal aortic valve structure and function. Pulmonic Valve The pulmonic valve was not well visualized . Pericardial Effusion No evidence of pericardial effusion. Pleural Effusion No evidence of pleural effusion. Aorta \ Miscellaneous The aorta is within normal limits. M-Mode Measurements (cm)  LVIDd: 4.01 cm                         LVIDs: 1.85 cm  IVSd: 1.45 cm                          IVSs: 1.23 cm  LVPWd: 1.32 cm                         LVPWs: 1.61 cm  Rt. Vent.  Dimension: 1.07 cm           AO Root Dimension: 2.8 cm                                         ACS: 1.47 cm                                         LA: 3.04 cm                                         LVOT: 1.98 cm Doppler Measurements:  AV Velocity:0.01 m/s                   MV Peak E-Wave: 0.96 m/s  AV Peak Gradient: 7.73 mmHg            MV Peak A-Wave: 1.58 m/s  AV Mean Gradient: 3.01 mmHg  AV Area (Continuity):1.27 cm^2         MV P1/2t: 105.6 msec  TR Velocity:2.29 m/s                   MVA by PHT2.08 cm^2  TR Gradient:21.01 mmHg                 Estimated RAP:10 mmHg                                         RVSP:31 mmHg Valves  Mitral Valve  Peak E-Wave: 0.96 m/s          Peak A-Wave: 1.58 m/s  P1/2t: 105.6 msec              E/A Ratio: 0.61                                 Peak Gradient: 3.7 mmHg  Area (PHT): 2.08 cm^2          Deceleration Time: 349.6 msec  MR Velocity: 4.66 m/s  Aortic Valve  Peak Velocity: 1.39 m/s                Mean Velocity: 0.79 m/s  Peak Gradient: 7.73 mmHg               Mean Gradient: 3.01 mmHg  Area (continuity): 1.27 cm^2  AV VTI: 28.28 cm  Cusp Separation: 1.47 cm  Tricuspid Valve  Estimated RVSP: 31 mmHg              Estimated RAP: 10 mmHg  TR Velocity: 2.29 m/s                TR Gradient: 21.01 mmHg  Pulmonic Valve  Peak Velocity: 0.68 m/s           Peak Gradient: 1.86 mmHg                                    Estimated PASP: 31.01 mmHg  LVOT  Peak Velocity: 0.72 m/s              Mean Velocity: 0.41 m/s  Peak Gradient: 2 mmHg                Mean Gradient: 0.9 mmHg  LVOT Diameter: 1.98 cm               LVOT VTI: 11.64 cm Structures  Left Atrium  LA Dimension: 3.04 cm                 LA Area: 18.08 cm^2  LA/Aorta: 1.09  LA Volume/Index: 49.97 ml  Left Ventricle  Diastolic Dimension: 7.99 cm         Systolic Dimension: 8.36 cm  Septum Diastolic: 6.11 cm            Septum Systolic: 3.78 cm  PW Diastolic: 9.71 cm                PW Systolic: 7.74 cm                                       FS: 53.9 %  LV EDV/LV EDV Index: 70.29 ml        LV ESV/LV ESV Index: 10.47 ml  EF Calculated: 85.1 %  LVOT Diameter: 1.98 cm  Right Ventricle  Diastolic Dimension: 4.09 cm                                    RV Systolic Pressure: 93.77 mmHg Aorta/ Miscellaneous Aorta  Aortic Root: 2.8 cm  LVOT Diameter: 1.98 cm    Xr Chest Portable    Result Date: 3/19/2021  EXAMINATION: XR CHEST PORTABLE CLINICAL HISTORY: INTUBATION COMPARISONS: OCTOBER 9, 2020 FINDINGS: Endotracheal tube has migrated 3 cm inferiorly, just lying cephalad to mojgan. Nasogastric tube courses beneath diaphragm. Osseous structures intact. Cardiopericardial silhouette normal. Aorta calcified. Ill-defined area increased opacity right  lung base. RIGHT LOWER LUNG SUBSEGMENTAL ATELECTASIS/PNEUMONIA. INTERVAL INFERIOR MIGRATION ENDOTRACHEAL TUBE 3 CM WITH TIP NOW JUST PROXIMAL TO MOJGAN. MAY WISH TO RETRACT ENDOTRACHEAL TUBE 2 CM. Xr Chest Portable    Result Date: 3/19/2021  EXAMINATION: XR CHEST PORTABLE CLINICAL HISTORY: RESPIRATORY FAILURE COMPARISONS: MARCH 16, 2021 FINDINGS: Endotracheal tube tip 2.3 cm superior to mojgan. Nasogastric tube courses beneath diaphragm area osseous structures intact. Ill-defined areas increase opacity at lung bases. Cardiopericardial silhouette normal.     BIBASILAR SUBSEGMENTAL ATELECTASIS/PNEUMONIA IN THIS PARTIALLY EXPIRATORY CHEST RADIOGRAPH. Recent Labs     03/25/21  1119 03/26/21  0649 03/27/21  0518   WBC 17.9* 14.1* 16.2*   HGB 9.2* 7.7* 10.9*    331 429*     Recent Labs     03/25/21  1119 03/26/21  0649 03/27/21  0518     145* 145* 148*   K 4.6  4.6 4.2 5.1*   *  110* 111* 113*   CO2 22  22 23 19*   BUN 52*  52* 68* 60*   CREATININE 1.72*  1.63* 1.75* 1.48*   GLUCOSE 370*  374* 295* 85     Recent Labs     03/25/21  1119 03/26/21  0649 03/27/21  0518   BILITOT <0.2 <0.2 <0.2   ALKPHOS 123 98 100   AST 15 11 26   ALT 46* 31 31     Lab Results   Component Value Date    PROTIME 14.3 03/26/2021    INR 1.1 03/26/2021     No results found for: LITHIUM, DILFRTOT, VALPROATE    ASSESSMENT AND PLAN  Encephalopathy secondary to cardiorespiratory arrest.  Patient may have some hypoxemic damage though this very difficult to certain as patient is now extubated and off sedation.   We will keep an eye on this repeat CT scan did not anything significant as far as mass lesions or structural lesions no early CT sign changes seen to suggest any acute ischemia and no evidence of any gray-white matter changes to suggest severe hypoxic ischemic changes. She does have some right-sided weakness though it is not very apparent and is very difficult as she still remains lethargic postextubation.     Critical care time 35 minutes

## 2021-03-27 NOTE — PROGRESS NOTES
Department of Internal Medicine  General Internal Medicine  Attending Progress Note      SUBJECTIVE:  Pt seen and examined. Extubated. Pt more awake and alert today. States she is thirsty and wants a drink.  SLP to evaluate today    OBJECTIVE      Medications    Current Facility-Administered Medications: insulin lispro (HUMALOG) injection vial 0-6 Units, 0-6 Units, Subcutaneous, 6x Daily  dextrose 5% bolus 750 mL, 750 mL, Intravenous, Once  lidocaine 2 % injection 5 mL, 5 mL, Intradermal, Once  sodium chloride flush 0.9 % injection 10 mL, 10 mL, Intravenous, 2 times per day  sodium chloride flush 0.9 % injection 10 mL, 10 mL, Intravenous, PRN  0.9 % sodium chloride infusion, 250 mL, Intravenous, Once  carvedilol (COREG) tablet 25 mg, 25 mg, Oral, BID  cloNIDine (CATAPRES) tablet 0.2 mg, 0.2 mg, Oral, BID  glucose (GLUTOSE) 40 % oral gel 15 g, 15 g, Oral, PRN  dextrose 50 % IV solution, 12.5 g, Intravenous, PRN  glucagon (rDNA) injection 1 mg, 1 mg, Intramuscular, PRN  dextrose 5 % solution, 100 mL/hr, Intravenous, PRN  meropenem (MERREM) 1,000 mg in sodium chloride 0.9 % 100 mL IVPB (mini-bag), 1,000 mg, Intravenous, Q12H  0.9 % sodium chloride infusion, , Intravenous, PRN  nitroglycerin (NITRO-BID) 2 % ointment 1 inch, 1 inch, Topical, 4 times per day  methylPREDNISolone sodium (SOLU-MEDROL) injection 40 mg, 40 mg, Intravenous, Daily  pantoprazole (PROTONIX) injection 40 mg, 40 mg, Intravenous, Daily **AND** sodium chloride (PF) 0.9 % injection 10 mL, 10 mL, Intravenous, Daily  heparin (porcine) injection 5,000 Units, 5,000 Units, Subcutaneous, 3 times per day  dexmedetomidine (PRECEDEX) 400 mcg in sodium chloride 0.9 % 100 mL infusion, 0.2-1.4 mcg/kg/hr, Intravenous, Continuous  docusate (COLACE) 50 MG/5ML liquid 100 mg, 100 mg, Oral, BID  polyethylene glycol (GLYCOLAX) packet 17 g, 17 g, Oral, Daily  budesonide (PULMICORT) nebulizer suspension 500 mcg, 0.5 mg, Nebulization, BID  hydrALAZINE (APRESOLINE) injection 10 mg, 10 mg, Intravenous, Q4H PRN **OR** hydrALAZINE (APRESOLINE) injection 20 mg, 20 mg, Intravenous, Q4H PRN  labetalol (NORMODYNE;TRANDATE) injection 20 mg, 20 mg, Intravenous, Q4H PRN  labetalol (NORMODYNE;TRANDATE) injection 40 mg, 40 mg, Intravenous, Q4H PRN  promethazine (PHENERGAN) tablet 12.5 mg, 12.5 mg, Oral, Q6H PRN **OR** ondansetron (ZOFRAN) injection 4 mg, 4 mg, Intravenous, Q6H PRN  chlorhexidine (PERIDEX) 0.12 % solution 15 mL, 15 mL, Mouth/Throat, BID  sodium chloride flush 0.9 % injection 10 mL, 10 mL, Intravenous, 2 times per day  sodium chloride flush 0.9 % injection 10 mL, 10 mL, Intravenous, PRN  acetaminophen (TYLENOL) tablet 650 mg, 650 mg, Oral, Q6H PRN **OR** acetaminophen (TYLENOL) suppository 650 mg, 650 mg, Rectal, Q6H PRN  ipratropium-albuterol (DUONEB) nebulizer solution 1 ampule, 1 ampule, Inhalation, 4x daily  Physical    VITALS:  BP (!) 175/51   Pulse 101   Temp 98.5 °F (36.9 °C) (Bladder)   Resp 20   Ht 4' 11\" (1.499 m)   Wt 153 lb 14.1 oz (69.8 kg)   LMP  (LMP Unknown)   SpO2 100%   BMI 31.08 kg/m²   Constitutional: awake and alert today.  Speaking and answering questions  Head: Normocephalic, atraumatic  ENT: moist mucous membranes, BIPAP in place  Neck: neck supple, trachea midline  Lungs: Good inspiratory effort, bilateral rhonchi and crackles  Heart: RRR, normal S1 and S2  GI: Soft, non-distended, +BS  MSK: no edema noted  Skin: warm, dry     Data    CBC:   Lab Results   Component Value Date    WBC 16.2 03/27/2021    RBC 3.67 03/27/2021    HGB 10.9 03/27/2021    HCT 33.5 03/27/2021    MCV 91.4 03/27/2021    MCH 29.7 03/27/2021    MCHC 32.5 03/27/2021    RDW 17.3 03/27/2021     03/27/2021    MPV 8.8 08/01/2015     CMP:    Lab Results   Component Value Date     03/27/2021    K 5.1 03/27/2021    K 4.4 10/19/2020     03/27/2021    CO2 19 03/27/2021    BUN 60 03/27/2021    CREATININE 1.48 03/27/2021    GFRAA 41.4 03/27/2021    LABGLOM 34.2 03/27/2021 GLUCOSE 85 03/27/2021    PROT 6.3 03/27/2021    LABALBU 2.6 03/27/2021    CALCIUM 9.6 03/27/2021    BILITOT <0.2 03/27/2021    ALKPHOS 100 03/27/2021    AST 26 03/27/2021    ALT 31 03/27/2021       ASSESSMENT AND PLAN      # Cardiac arrest s/p ROSC   - initially required Dopamine, norepinephrine and epinephrine infusion  - off pressors and hypothermia protocol  - TTE showed LVEF of 50%  - cardiology following     # Acute hypoxic respiratory failure  - requiring intubation and mechanical ventilation  - extubated 3/22, but had to be re-intubated same day due to stridor and respiratory distress. Continued to have stridor when cuff deflated  - extubated 3/26 to BIPAP. No re-intubation per family. On nasal cannula now  - palliative care consulted  - management per critical care service     # Acute encephalopathy  - likely hypoxic-ischemic etiology from cardiac arrest  - CT head was negative per report  - neurology following     # Acute / chronic anemia - improved  - with Hb of 5.8 on arrival, FOBT was positive  - improved s/p PRBCs given in ED  - on Protonix   - follow H/H      # Lactic acidosis  - in the setting of cardiac arrest  - improved with volume repletion     # Hyperkalemia  - resolved     # SAÚL/CKD3  - nephrology following     # IDDM2 with hyperglycemia  - improved, off insulin drip, on ISS     # Leukocytosis   - improved     Code status - DNRCCA    Disposition: Extubated but more awake today. SLP to evaluate. No re-intubation per family. Off pressors. [id number] Denise Davis for transfer to the floor when ok from intensivist as no critical care needs at this time. Palliative care consulted.       Gianna Mehta DO  Internal Medicine

## 2021-03-27 NOTE — PROGRESS NOTES
[facility]   Facility/Department: [facility]  Speech Language Pathology  Clinical Bedside Swallow Evaluation    Lily Junior  : 1945 [de-identified]67 y.o.)   MRN: [id number]  ROOM: [place] [address]  ADMISSION DATE: 3/18/2021  PATIENT DIAGNOSIS(ES): Cardiac arrest [facility]) [I46.9]  Chief Complaint   Patient presents with    Respiratory Distress     Patient Active Problem List    Diagnosis Date Noted    Cardiopulmonary arrest [facility])      Priority: High    Lumbosacral radiculopathy at L5 2015     Priority: High     Class: Acute    Spinal stenosis of lumbar region with neurogenic claudication 2015     Priority: High     Class: Chronic    High risk medication use-Norco - 17 OARRS PM&R, 18 OARRS PM&R, 18 OARRS PM&R, Urine Drug screen negative 17 PM&R--MED CONTRACT 2014     Priority: High    Acute respiratory failure with hypoxia (HCC)     Cardiac arrest ([address] 2021    Gait abnormality 10/14/2020    Late effects of CVA (cerebrovascular accident) 10/14/2020    Sepsis ([address] 10/06/2020    Major depressive disorder in remission ([address] 10/06/2020    Gastroesophageal reflux disease without esophagitis 10/06/2020    Acute cystitis without hematuria 10/06/2020    CKD (chronic kidney disease) stage 4, GFR 15-29 ml/min ([address] 10/06/2020    Adenomatous polyp of sigmoid colon     Adenomatous polyp of transverse colon     Acute encephalopathy     Flash pulmonary edema ([address] 2020    Acute on chronic kidney failure ([address] 2020    Dysarthria     Chronic fatigue     Symptomatic anemia 2020    COPD exacerbation ([address] 2020    Anemia     AVM (arteriovenous malformation)     Gastrointestinal hemorrhage 2020    HOCM (hypertrophic obstructive cardiomyopathy) ([address] 2019    Hypoglycemia     SAÚL (acute kidney injury) ([address] 10/23/2019    Acute on chronic diastolic (congestive) heart failure ([address] 10/13/2019    Chronic obstructive pulmonary disease with acute exacerbation ([facility] 10/12/2019    Hypertensive urgency 10/09/2019    Uncontrolled type 2 diabetes mellitus with hyperglycemia ([facility])     Acute on chronic diastolic heart failure ([address] 10/08/2019    CHF (congestive heart failure) ([facility])     PMB (postmenopausal bleeding)     Acute combined systolic and diastolic CHF, NYHA class 1 ([facility] 03/24/2019    Osteoarthritis of spine with radiculopathy, lumbar region 11/07/2018    Myalgia 05/11/2018    Intercostal neuropathy 05/11/2018    Diabetic asymmetric polyneuropathy ([facility] 04/11/2017    Cervical radicular pain 12/03/2016    Reactive depression 07/15/2016    Diabetic radiculopathy ([facility] 07/15/2016    Insomnia secondary to chronic pain 04/15/2016    Non-compliant patient NO showed FU 1/10/17 Dr Rober Reed 09/24/2015    Impaired mobility and activities of daily living 03/28/2015    Neck pain 03/04/2015    Artificial lens present 10/03/2014    Presbyopia 10/03/2014    Astigmatism, regular 10/03/2014    Cataract, nuclear sclerotic senile 10/03/2014    IDDM (insulin dependent diabetes mellitus) 10/03/2014    Regular astigmatism 10/03/2014    Nuclear senile cataract 10/03/2014    Memory deficit 06/04/2014    SOB (shortness of breath) on exertion 03/14/2014    Chest pain 03/14/2014    CTS (carpal tunnel syndrome) 02/09/2014    DJD (degenerative joint disease), lumbar 02/08/2014    Lumbosacral radiculopathy at S1 02/08/2014    DDD (degenerative disc disease), lumbar 02/08/2014    Dysphonia 02/08/2014    Insomnia 12/04/2013    Smoker 06/18/2013    Hypertension     Hyperlipidemia     Uncontrolled type 2 diabetes mellitus with complication, without long-term current use of insulin ([facility])     Fibromyalgia     Anxiety      Past Medical History:   Diagnosis Date    Anxiety     Asthma     dx 2019 / has smoked since age 11    CHF (congestive heart failure) ([facility])     Chronic back pain     Bilateral L5 S1 Radic on emg--surprisingly worse on the left than the right--pt's symptoms and her MRI show worse on the right    Chronic obstructive pulmonary disease with acute exacerbation ([facility] 10/12/2019    Depression     Fibromyalgia     Hyperlipidemia     meds > 8 yrs    Hypertension     meds > 45 yrs    On home O2     2l per n/c at bedtime mostly,     Osteoarthritis     Type II diabetes mellitus, uncontrolled ([facility]     hx > 8 yrs    Unspecified sleep apnea      Past Surgical History:   Procedure Laterality Date    BACK SURGERY  2017    lumbar disc    CARDIAC CATHETERIZATION  11/3/14     DR. MIRELES / no stents    COLONOSCOPY  08/29/2016    w/polypectomy     COLONOSCOPY N/A 9/29/2020    COLONOSCOPY WITH POLYPECTOMY performed by Jian Harris MD at [facility] N/A 10/7/2019    EUA HYSTEROSCOPY DILATATION AND CURETTAGE performed by Nilo Ayers DO at  [address], COLON, DIAGNOSTIC      EYE SURGERY      [place] with IOL OU / [address] [place]  [address]    umbilical hernia repair    OR ESOPHAGOGASTRODUODENOSCOPY TRANSORAL DIAGNOSTIC N/A 3/24/2017    EGD ESOPHAGOGASTRODUODENOSCOPY performed by Bill Iniguez MD at [place] [address] 2/8/2018    negative findings    OR REVISE MEDIAN N/CARPAL TUNNEL SURG Left 6/5/2017    LEFT  CARPAL TUNNEL RELEASE performed by Garry Alva MD at [place] [address]+U/K,RVUW N/A 2/8/2018    TONSILLECTOMY      as child    UPPER GASTROINTESTINAL ENDOSCOPY  08/26/2016    w/bx     UPPER GASTROINTESTINAL ENDOSCOPY N/A 2/25/2020    EGD possible biopsy performed by Jian Harris MD at [address] [place] [address] 7/1/2020    EGD PUSH ENTEROSCOPY performed by Tara Short MD at [facility]     Allergies   Allergen Reactions    Ibuprofen Nausea Only    Metformin And Related      Diarrhea      Darvon [Propoxyphene Hcl] Nausea And Vomiting DATE ONSET: 3/18/2021    Date of Evaluation: 3/27/2021   Evaluating Therapist: CRISTIAN Kuo    Recommended Diet and Intervention  Diet Solids Recommendation: Dysphagia Pureed (Dysphagia I)  Liquid Consistency Recommendation: Moderately Thick (Honey)  Recommended Form of Meds: PO  Recommendations: Assist feed;Dysphagia treatment; Modified barium swallow study  Therapeutic Interventions: Diet tolerance monitoring, Pharyngeal exercises, Oral motor exercises, Therapeutic PO trials with SLP    Compensatory Swallowing Strategies  Compensatory Swallowing Strategies: Eat/Feed slowly;Upright as possible for all oral intake;Small bites/sips    Reason for Referral  Tamara Cutler was referred for a bedside swallow evaluation to assess the efficiency of her swallow function, identify signs and symptoms of aspiration and make recommendations regarding safe dietary consistencies, effective compensatory strategies, and safe eating environment. General  Chart Reviewed: Yes  Comments: Pt intubated for 1 week. Pt extubated on 3/26/2021. Subjective  Subjective: Pt was alert, cooperative, and pleasant for BSE  Behavior/Cognition: Alert; Cooperative;Pleasant mood  Respiratory Status: O2 via nasual cannula  O2 Device: Nasal cannula  Communication Observation: Functional  Follows Directions: Simple  Dentition: Adequate; Some missing teeth  Patient Positioning: Upright in bed  Baseline Vocal Quality: Aphonic;Dysphonic  Prior Dysphagia History: None  Consistencies Administered: Reg solid; Dysphagia Pureed (Dysphagia I); Dysphagia Soft and Bite-Sized (Dysphagia III); Honey - teaspoon;Honey - cup;Nectar - cup;Nectar - teaspoon; Thin - cup; Ice Chips(Moistened toothette x3)    Current Diet level:  Current Diet : NPO  Current Liquid Diet : NPO    Oral Motor Deficits  Oral/Motor  Oral Motor: Exceptions to [facility]  Labial Strength: Reduced  Labial Coordination: Reduced  Lingual Strength: Reduced  Lingual Coordination: Reduced    Oral Phase Dysfunction  Oral Phase  Oral Phase: Exceptions  Oral Phase Dysfunction  Impaired Mastication: Reg Solid; Soft Solid  Decreased Anterior to Posterior Transit: Reg solid  Lingual/Palatal Residue: Reg solid; Soft solid  Oral Phase  Oral Phase - Comment: Moderate oral phase dysphagia characterized by generalized oral motor weakness, impaired mastication and increased mastication time following trials of soft and regular solid. Pt presented with mild-mod oral residue following trials. Pt was able to clear residue with use of liquid wash. Indicators of Pharyngeal Phase Dysfunction   Pharyngeal Phase  Pharyngeal Phase: Exceptions  Indicators of Pharyngeal Phase Dysfunction  Throat Clearing - Immediate: Nectar - teaspoon  Pharyngeal Phase   Pharyngeal: Suspected pharyngeal phase dysphagia characteized by immediate throat clear following trials of nectar thick liquids via spoon and watery eyes following thin liquids via cup. Laryngeal elevation was present, and swallow onset was mildly delayed (~3-4 seconds), noted through observation and palpation. No overt s/s of aspiration observed following trials of honey thick liquids, soft solid, and regular solid. Impression  Dysphagia Diagnosis: Moderate oral stage dysphagia; Suspected needs further assessment  Dysphagia Impression : Moderate oral phase dysphagia; Suspected pharyngeal phase dysphagia  Dysphagia Outcome Severity Scale: Level 3: Moderate dysphagia- Total assisstance, supervision or strategies. Two or more diet consistencies restricted     Treatment Plan  Requires SLP Intervention: Yes  Duration/Frequency of Treatment: 3-5x/wk for LOS or until all goals met  D/C Recommendations: To be determined  Referral To: Dietician    Treatment/Goals  Short-term Goals  Timeframe for Short-term Goals: 1 week  Goal 1: Pt will tolerate puree consistencies and honey thick liquids with no overt s/s of aspiration in all given opportunities.   Goal 2: Pt will tolerate trials of

## 2021-03-27 NOTE — PROGRESS NOTES
Spiritual Care Services     Summary of Visit:  Pt able to respond this morning, appropriate answers to questions. Spouse Librado at bedside. Both grateful for this day, open and at peace with whatever comes, no spiritual or emotional concerns this day. Spiritual Assessment/Intervention/Outcomes:    Encounter Summary  Services provided to[de-identified] Patient and family together  Referral/Consult From[de-identified] Rounding  Support System: Spouse, Children  Continue Visiting: Yes  Complexity of Encounter: Low  Length of Encounter: 15 minutes  Spiritual Assessment Completed: Yes  Routine  Type: Follow up  Assessment: Calm, Approachable  Intervention: Prayer, Sustaining presence/ Ministry of presence, Discussed belief system/Adventist practices/angelo, Discussed relationship with God, Discussed meaning/purpose  Outcome: Coping, Comfort  Crisis  Type: Code(Blue)  Assessment: Unable to respond  Intervention: Sustaining presence/ Ministry of presence  Spiritual/Yarsani  Type: Spiritual support  Assessment: Approachable, Calm, Coping  Intervention: Prayer, Nurtured hope, Explored feelings, thoughts, concerns, Discussed illness/injury and it's impact  Outcome: Expressed gratitude, Engaged in conversation, Expressed feelings/needs/concerns, Coping     Grief and Life Adjustment  Type: Adjustment to illness  Assessment: Tearful, Approachable, Shock, Despair  Intervention: Active listening, Sustaining presence/ Ministry of presence  Outcome: Receptive, Comfort, Tearful, Less anxious, less agitated  Advance Directives (For Healthcare)  Healthcare Directive: No, patient does not have an advance directive for healthcare treatment           Values / Beliefs  Do you have any ethnic, cultural, sacramental, or spiritual Adventist needs you would like us to be aware of while you are in the hospital?: No    Care Plan:    Ongoing spiritual care availability helpful, pt and family coping well this day.      Spiritual Care Services   Electronically signed by

## 2021-03-28 LAB
ALBUMIN SERPL-MCNC: 2.9 G/DL (ref 3.5–4.6)
ALP BLD-CCNC: 97 U/L (ref 40–130)
ALT SERPL-CCNC: 26 U/L (ref 0–33)
ANION GAP SERPL CALCULATED.3IONS-SCNC: 9 MEQ/L (ref 9–15)
APTT: 32 SEC (ref 24.4–36.8)
AST SERPL-CCNC: 16 U/L (ref 0–35)
BASOPHILS ABSOLUTE: 0 K/UL (ref 0–0.2)
BASOPHILS RELATIVE PERCENT: 0.3 %
BILIRUB SERPL-MCNC: <0.2 MG/DL (ref 0.2–0.7)
BLOOD CULTURE, ROUTINE: NORMAL
BUN BLDV-MCNC: 53 MG/DL (ref 8–23)
CALCIUM IONIZED, CALC AT PH 7.4: 1.33 MMOL/L (ref 1.11–1.3)
CALCIUM IONIZED, CALC AT PH 7.4: 1.43 MMOL/L (ref 1.11–1.3)
CALCIUM IONIZED: 1.4 MMOL/L (ref 1.11–1.3)
CALCIUM IONIZED: 1.45 MMOL/L (ref 1.11–1.3)
CALCIUM SERPL-MCNC: 10.2 MG/DL (ref 8.5–9.9)
CHLORIDE BLD-SCNC: 115 MEQ/L (ref 95–107)
CO2: 25 MEQ/L (ref 20–31)
CREAT SERPL-MCNC: 1.42 MG/DL (ref 0.5–0.9)
CULTURE, BLOOD 2: NORMAL
EOSINOPHILS ABSOLUTE: 0 K/UL (ref 0–0.7)
EOSINOPHILS RELATIVE PERCENT: 0.3 %
GFR AFRICAN AMERICAN: 43.4
GFR NON-AFRICAN AMERICAN: 35.9
GLOBULIN: 3.3 G/DL (ref 2.3–3.5)
GLUCOSE BLD-MCNC: 157 MG/DL (ref 60–115)
GLUCOSE BLD-MCNC: 175 MG/DL (ref 70–99)
GLUCOSE BLD-MCNC: 297 MG/DL (ref 60–115)
GLUCOSE BLD-MCNC: 394 MG/DL (ref 60–115)
GLUCOSE BLD-MCNC: 441 MG/DL (ref 60–115)
HCT VFR BLD CALC: 31 % (ref 37–47)
HEMOGLOBIN: 10.2 G/DL (ref 12–16)
INR BLD: 1.1
LYMPHOCYTES ABSOLUTE: 1 K/UL (ref 1–4.8)
LYMPHOCYTES RELATIVE PERCENT: 11 %
MAGNESIUM: 2.4 MG/DL (ref 1.7–2.4)
MCH RBC QN AUTO: 29.6 PG (ref 27–31.3)
MCHC RBC AUTO-ENTMCNC: 32.8 % (ref 33–37)
MCV RBC AUTO: 90.4 FL (ref 82–100)
MONOCYTES ABSOLUTE: 0.9 K/UL (ref 0.2–0.8)
MONOCYTES RELATIVE PERCENT: 10.2 %
NEUTROPHILS ABSOLUTE: 7.2 K/UL (ref 1.4–6.5)
NEUTROPHILS RELATIVE PERCENT: 78.2 %
PDW BLD-RTO: 16.9 % (ref 11.5–14.5)
PERFORMED ON: ABNORMAL
PHOSPHORUS: 3.7 MG/DL (ref 2.3–4.8)
PLATELET # BLD: 460 K/UL (ref 130–400)
POTASSIUM SERPL-SCNC: 4.8 MEQ/L (ref 3.4–4.9)
PROTHROMBIN TIME: 14.2 SEC (ref 12.3–14.9)
RBC # BLD: 3.43 M/UL (ref 4.2–5.4)
SODIUM BLD-SCNC: 149 MEQ/L (ref 135–144)
TOTAL PROTEIN: 6.2 G/DL (ref 6.3–8)
WBC # BLD: 9.2 K/UL (ref 4.8–10.8)

## 2021-03-28 PROCEDURE — 6370000000 HC RX 637 (ALT 250 FOR IP): Performed by: INTERNAL MEDICINE

## 2021-03-28 PROCEDURE — 6360000002 HC RX W HCPCS: Performed by: INTERNAL MEDICINE

## 2021-03-28 PROCEDURE — 85730 THROMBOPLASTIN TIME PARTIAL: CPT

## 2021-03-28 PROCEDURE — 92526 ORAL FUNCTION THERAPY: CPT

## 2021-03-28 PROCEDURE — 99233 SBSQ HOSP IP/OBS HIGH 50: CPT | Performed by: INTERNAL MEDICINE

## 2021-03-28 PROCEDURE — 2700000000 HC OXYGEN THERAPY PER DAY

## 2021-03-28 PROCEDURE — 2580000003 HC RX 258: Performed by: INTERNAL MEDICINE

## 2021-03-28 PROCEDURE — 94640 AIRWAY INHALATION TREATMENT: CPT

## 2021-03-28 PROCEDURE — 36415 COLL VENOUS BLD VENIPUNCTURE: CPT

## 2021-03-28 PROCEDURE — C9113 INJ PANTOPRAZOLE SODIUM, VIA: HCPCS | Performed by: INTERNAL MEDICINE

## 2021-03-28 PROCEDURE — 85025 COMPLETE CBC W/AUTO DIFF WBC: CPT

## 2021-03-28 PROCEDURE — 97535 SELF CARE MNGMENT TRAINING: CPT

## 2021-03-28 PROCEDURE — 94761 N-INVAS EAR/PLS OXIMETRY MLT: CPT

## 2021-03-28 PROCEDURE — 83735 ASSAY OF MAGNESIUM: CPT

## 2021-03-28 PROCEDURE — 84100 ASSAY OF PHOSPHORUS: CPT

## 2021-03-28 PROCEDURE — 82330 ASSAY OF CALCIUM: CPT

## 2021-03-28 PROCEDURE — 2060000000 HC ICU INTERMEDIATE R&B

## 2021-03-28 PROCEDURE — 85610 PROTHROMBIN TIME: CPT

## 2021-03-28 PROCEDURE — 80053 COMPREHEN METABOLIC PANEL: CPT

## 2021-03-28 RX ORDER — ASPIRIN 81 MG/1
81 TABLET ORAL DAILY
Status: DISCONTINUED | OUTPATIENT
Start: 2021-03-28 | End: 2021-04-02 | Stop reason: HOSPADM

## 2021-03-28 RX ORDER — METOLAZONE 2.5 MG/1
5 TABLET ORAL ONCE
Status: COMPLETED | OUTPATIENT
Start: 2021-03-28 | End: 2021-03-28

## 2021-03-28 RX ORDER — INSULIN GLARGINE 100 [IU]/ML
20 INJECTION, SOLUTION SUBCUTANEOUS NIGHTLY
Status: COMPLETED | OUTPATIENT
Start: 2021-03-28 | End: 2021-03-28

## 2021-03-28 RX ORDER — IPRATROPIUM BROMIDE AND ALBUTEROL SULFATE 2.5; .5 MG/3ML; MG/3ML
1 SOLUTION RESPIRATORY (INHALATION) 3 TIMES DAILY
Status: DISCONTINUED | OUTPATIENT
Start: 2021-03-28 | End: 2021-04-02 | Stop reason: HOSPADM

## 2021-03-28 RX ORDER — INSULIN GLARGINE 100 [IU]/ML
20 INJECTION, SOLUTION SUBCUTANEOUS NIGHTLY
Status: DISCONTINUED | OUTPATIENT
Start: 2021-03-28 | End: 2021-03-31

## 2021-03-28 RX ORDER — ALBUTEROL SULFATE 2.5 MG/3ML
2.5 SOLUTION RESPIRATORY (INHALATION)
Status: DISCONTINUED | OUTPATIENT
Start: 2021-03-28 | End: 2021-04-02 | Stop reason: HOSPADM

## 2021-03-28 RX ORDER — DEXTROSE MONOHYDRATE 50 MG/ML
INJECTION, SOLUTION INTRAVENOUS CONTINUOUS
Status: DISCONTINUED | OUTPATIENT
Start: 2021-03-28 | End: 2021-03-29

## 2021-03-28 RX ADMIN — IPRATROPIUM BROMIDE AND ALBUTEROL SULFATE 1 AMPULE: .5; 3 SOLUTION RESPIRATORY (INHALATION) at 09:43

## 2021-03-28 RX ADMIN — NITROGLYCERIN 1 INCH: 20 OINTMENT TOPICAL at 18:00

## 2021-03-28 RX ADMIN — CLONIDINE HYDROCHLORIDE 0.2 MG: 0.1 TABLET ORAL at 09:57

## 2021-03-28 RX ADMIN — CARVEDILOL 25 MG: 25 TABLET, FILM COATED ORAL at 09:57

## 2021-03-28 RX ADMIN — CARVEDILOL 25 MG: 25 TABLET, FILM COATED ORAL at 21:50

## 2021-03-28 RX ADMIN — NITROGLYCERIN 1 INCH: 20 OINTMENT TOPICAL at 14:54

## 2021-03-28 RX ADMIN — VERAPAMIL HYDROCHLORIDE 80 MG: 80 TABLET, FILM COATED ORAL at 09:57

## 2021-03-28 RX ADMIN — PANTOPRAZOLE SODIUM 40 MG: 40 INJECTION, POWDER, FOR SOLUTION INTRAVENOUS at 09:56

## 2021-03-28 RX ADMIN — MEROPENEM 1000 MG: 1 INJECTION, POWDER, FOR SOLUTION INTRAVENOUS at 21:50

## 2021-03-28 RX ADMIN — HEPARIN SODIUM 5000 UNITS: 5000 INJECTION INTRAVENOUS; SUBCUTANEOUS at 21:59

## 2021-03-28 RX ADMIN — SODIUM CHLORIDE, PRESERVATIVE FREE 10 ML: 5 INJECTION INTRAVENOUS at 09:57

## 2021-03-28 RX ADMIN — CLONIDINE HYDROCHLORIDE 0.2 MG: 0.1 TABLET ORAL at 21:50

## 2021-03-28 RX ADMIN — METHYLPREDNISOLONE SODIUM SUCCINATE 40 MG: 40 INJECTION, POWDER, FOR SOLUTION INTRAMUSCULAR; INTRAVENOUS at 09:56

## 2021-03-28 RX ADMIN — INSULIN GLARGINE 20 UNITS: 100 INJECTION, SOLUTION SUBCUTANEOUS at 22:05

## 2021-03-28 RX ADMIN — Medication 10 ML: at 09:58

## 2021-03-28 RX ADMIN — DEXTROSE MONOHYDRATE: 50 INJECTION, SOLUTION INTRAVENOUS at 19:38

## 2021-03-28 RX ADMIN — BUDESONIDE 500 MCG: 0.5 SUSPENSION RESPIRATORY (INHALATION) at 04:07

## 2021-03-28 RX ADMIN — SODIUM CHLORIDE, PRESERVATIVE FREE 10 ML: 5 INJECTION INTRAVENOUS at 22:09

## 2021-03-28 RX ADMIN — Medication 10 ML: at 22:10

## 2021-03-28 RX ADMIN — ASPIRIN 81 MG: 81 TABLET, COATED ORAL at 14:54

## 2021-03-28 RX ADMIN — METOLAZONE 5 MG: 2.5 TABLET ORAL at 14:54

## 2021-03-28 RX ADMIN — INSULIN GLARGINE 20 UNITS: 100 INJECTION, SOLUTION SUBCUTANEOUS at 22:00

## 2021-03-28 RX ADMIN — NITROGLYCERIN 1 INCH: 20 OINTMENT TOPICAL at 05:39

## 2021-03-28 RX ADMIN — IPRATROPIUM BROMIDE AND ALBUTEROL SULFATE 1 AMPULE: .5; 3 SOLUTION RESPIRATORY (INHALATION) at 04:07

## 2021-03-28 RX ADMIN — MEROPENEM 1000 MG: 1 INJECTION, POWDER, FOR SOLUTION INTRAVENOUS at 09:57

## 2021-03-28 RX ADMIN — IPRATROPIUM BROMIDE AND ALBUTEROL SULFATE 1 AMPULE: .5; 3 SOLUTION RESPIRATORY (INHALATION) at 14:17

## 2021-03-28 RX ADMIN — BUDESONIDE 500 MCG: 0.5 SUSPENSION RESPIRATORY (INHALATION) at 14:17

## 2021-03-28 RX ADMIN — HEPARIN SODIUM 5000 UNITS: 5000 INJECTION INTRAVENOUS; SUBCUTANEOUS at 05:40

## 2021-03-28 RX ADMIN — IPRATROPIUM BROMIDE AND ALBUTEROL SULFATE 1 AMPULE: .5; 3 SOLUTION RESPIRATORY (INHALATION) at 19:04

## 2021-03-28 RX ADMIN — NITROGLYCERIN 1 INCH: 20 OINTMENT TOPICAL at 00:54

## 2021-03-28 RX ADMIN — VERAPAMIL HYDROCHLORIDE 80 MG: 80 TABLET, FILM COATED ORAL at 22:33

## 2021-03-28 RX ADMIN — CHLORHEXIDINE GLUCONATE 15 ML: 1.2 RINSE ORAL at 21:50

## 2021-03-28 RX ADMIN — HEPARIN SODIUM 5000 UNITS: 5000 INJECTION INTRAVENOUS; SUBCUTANEOUS at 14:54

## 2021-03-28 ASSESSMENT — PAIN SCALES - GENERAL
PAINLEVEL_OUTOF10: 0

## 2021-03-28 ASSESSMENT — ENCOUNTER SYMPTOMS
CHEST TIGHTNESS: 0
SHORTNESS OF BREATH: 0
GASTROINTESTINAL NEGATIVE: 1
DIARRHEA: 0
PHOTOPHOBIA: 0
EYES NEGATIVE: 1
SORE THROAT: 1
WHEEZING: 0
NAUSEA: 0
BLOOD IN STOOL: 0
VOICE CHANGE: 1
STRIDOR: 0
COUGH: 0
BACK PAIN: 0
TROUBLE SWALLOWING: 1
RESPIRATORY NEGATIVE: 1
ALLERGIC/IMMUNOLOGIC NEGATIVE: 1
VOMITING: 0

## 2021-03-28 NOTE — PROGRESS NOTES
Physical Therapy Med Surg Daily Treatment Note  Facility/Department: [facility]  Room: [place] [address]       NAME: Doris Paul  : 1945 (18 y.o.)  MRN: [id number]  CODE STATUS: Limited    Date of Service: 3/28/2021    Patient Diagnosis(es): Cardiac arrest [facility]) [I46.9]   Chief Complaint   Patient presents with    Respiratory Distress     Patient Active Problem List    Diagnosis Date Noted    Cardiopulmonary arrest [facility])      Priority: High    Lumbosacral radiculopathy at L5 2015     Priority: High     Class: Acute    Spinal stenosis of lumbar region with neurogenic claudication 2015     Priority: High     Class: Chronic    High risk medication use-Norco - 17 OARRS PM&R, 18 OARRS PM&R, 18 OARRS PM&R, Urine Drug screen negative 17 PM&R--MED CONTRACT 2014     Priority: High    Acute respiratory failure with hypoxia (HCC)     Cardiac arrest ([address] 2021    Gait abnormality 10/14/2020    Late effects of CVA (cerebrovascular accident) 10/14/2020    Sepsis ([address] 10/06/2020    Major depressive disorder in remission ([address] 10/06/2020    Gastroesophageal reflux disease without esophagitis 10/06/2020    Acute cystitis without hematuria 10/06/2020    CKD (chronic kidney disease) stage 4, GFR 15-29 ml/min ([address] 10/06/2020    Adenomatous polyp of sigmoid colon     Adenomatous polyp of transverse colon     Acute encephalopathy     Flash pulmonary edema ([address] 2020    Acute on chronic kidney failure ([address] 2020    Dysarthria     Chronic fatigue     Symptomatic anemia 2020    COPD exacerbation ([address] 2020    Anemia     AVM (arteriovenous malformation)     Gastrointestinal hemorrhage 2020    HOCM (hypertrophic obstructive cardiomyopathy) ([address] 2019    Hypoglycemia     SAÚL (acute kidney injury) ([address] 10/23/2019    Acute on chronic diastolic (congestive) heart failure ([address] 10/13/2019    Chronic obstructive pulmonary disease with acute exacerbation ([facility] 10/12/2019    Hypertensive urgency 10/09/2019    Uncontrolled type 2 diabetes mellitus with hyperglycemia ([facility])     Acute on chronic diastolic heart failure ([address] 10/08/2019    CHF (congestive heart failure) ([facility])     PMB (postmenopausal bleeding)     Acute combined systolic and diastolic CHF, NYHA class 1 ([facility] 03/24/2019    Osteoarthritis of spine with radiculopathy, lumbar region 11/07/2018    Myalgia 05/11/2018    Intercostal neuropathy 05/11/2018    Diabetic asymmetric polyneuropathy ([facility] 04/11/2017    Cervical radicular pain 12/03/2016    Reactive depression 07/15/2016    Diabetic radiculopathy ([facility] 07/15/2016    Insomnia secondary to chronic pain 04/15/2016    Non-compliant patient NO showed FU 1/10/17 Dr Natalie Garcia 09/24/2015    Impaired mobility and activities of daily living 03/28/2015    Neck pain 03/04/2015    Artificial lens present 10/03/2014    Presbyopia 10/03/2014    Astigmatism, regular 10/03/2014    Cataract, nuclear sclerotic senile 10/03/2014    IDDM (insulin dependent diabetes mellitus) 10/03/2014    Regular astigmatism 10/03/2014    Nuclear senile cataract 10/03/2014    Memory deficit 06/04/2014    SOB (shortness of breath) on exertion 03/14/2014    Chest pain 03/14/2014    CTS (carpal tunnel syndrome) 02/09/2014    DJD (degenerative joint disease), lumbar 02/08/2014    Lumbosacral radiculopathy at S1 02/08/2014    DDD (degenerative disc disease), lumbar 02/08/2014    Dysphonia 02/08/2014    Insomnia 12/04/2013    Smoker 06/18/2013    Hypertension     Hyperlipidemia     Uncontrolled type 2 diabetes mellitus with complication, without long-term current use of insulin ([facility])     Fibromyalgia     Anxiety         Past Medical History:   Diagnosis Date    Anxiety     Asthma     dx 2019 / has smoked since age 11    CHF (congestive heart failure) ([facility])     Chronic back pain     Bilateral L5 S1 Radic on emg--surprisingly worse on the left than the right--pt's symptoms and her MRI show worse on the right    Chronic obstructive pulmonary disease with acute exacerbation ([address] [facility] 10/12/2019    Depression     Fibromyalgia     Hyperlipidemia     meds > 8 yrs    Hypertension     meds > 45 yrs    On home O2     2l per n/c at bedtime mostly,     Osteoarthritis     Type II diabetes mellitus, uncontrolled ([address]     hx > 8 yrs    Unspecified sleep apnea      Past Surgical History:   Procedure Laterality Date    BACK SURGERY  2017    lumbar disc    CARDIAC CATHETERIZATION  11/3/14     DR. MIRELES / no stents    COLONOSCOPY  08/29/2016    w/polypectomy     COLONOSCOPY N/A 9/29/2020    COLONOSCOPY WITH POLYPECTOMY performed [facility] Dede Ellison MD at [facility] N/A 10/7/2019    EUA HYSTEROSCOPY DILATATION AND CURETTAGE performed by Teri Amado DO at [facility]. [address], COLON, DIAGNOSTIC      EYE SURGERY      [place] with IOL OU / [address] [place]  [address]    umbilical hernia repair    MA ESOPHAGOGASTRODUODENOSCOPY TRANSORAL DIAGNOSTIC N/A 3/24/2017    EGD ESOPHAGOGASTRODUODENOSCOPY performed by Pablito Arrington MD at [place] [address] 2/8/2018    negative findings    MA REVISE MEDIAN N/CARPAL TUNNEL SURG Left 6/5/2017    LEFT  CARPAL TUNNEL RELEASE performed by Goldy Streeter MD at [facility] XXVD,7+B/Z,XXTGS N/A 2/8/2018    TONSILLECTOMY      as child    UPPER GASTROINTESTINAL ENDOSCOPY  08/26/2016    w/bx     UPPER GASTROINTESTINAL ENDOSCOPY N/A 2/25/2020    EGD possible biopsy performed by Dede Ellison MD at [address] [place] [address] 7/1/2020    EGD PUSH ENTEROSCOPY performed by Kelley Lopez MD at [facility]       Restrictions  Restrictions/Precautions: Contact Precautions; Fall Risk(ESBL in urine)    SUBJECTIVE   General  Chart Reviewed: Yes  Subjective  Subjective: \"Can I have some water\" Pt willing to participate with PT    Pre-Session Pain Report     Pain Screening  Patient Currently in Pain: No       Post-Session Pain Report   None         OBJECTIVE        Bed mobility  Rolling to Right: Moderate assistance  Supine to Sit: Maximum assistance  Sit to Supine: Maximum assistance  Scooting: Maximal assistance  Comment: Able to initiate movements thoguh Max A to follow through and fully complete    Transfers  Comment: NT due to safety concerns                   Neuromuscular Education  Neuromuscular Comments: Sitting balance EOB: focus on posture and maintaining midline- varying assistance to maintain; with reaching utilizing Lt UE support     Exercises  Hip Flexion: x5 limited ROM Lasha  Knee Long Arc Quad: x10  Ankle Pumps: x10                               ASSESSMENT   Body structures, Functions, Activity limitations: Decreased functional mobility ; Decreased strength;Decreased posture;Decreased balance;Decreased ROM; Decreased endurance  Assessment: Pt with improved ability to initiate bed mobility though max A to complete. Varying assistance to maintain seated balance from close SB to Mod A, VCs to improve posture as pt demo's flexed trunk with mild posterior lean. Returned to supine after session due to safety concerns if transferred.   Prognosis: Good  REQUIRES PT FOLLOW UP: Yes     Discharge Recommendations:  Continue to assess pending progress, Patient would benefit from continued therapy after discharge    Goals  Long term goals  Long term goal 1: Bed mobility with Mod A  Long term goal 2: unsupported sitting with Min A x 8 min  Long term goal 3: functional reach in sitting <4 inch OOBOS  Long term goal 4: progress to functional transfers with 2 person Mod A  Long term goal 5: to be assessed for gait when appropriate  Patient Goals   Patient goals : \"I want to get better\"    PLAN    Times per week: 3-6  Safety Devices  Type of devices: Call light within reach, Left in bed, Bed alarm in place, Nurse notified     [address] [employer] ([address] [place] [address]) [address] Raw Score : 6     Therapy Time   Individual   Time In 0824   Time Out 0842   Minutes 18     Timed Code Treatment Minutes: 18 Minutes   Bed mob: 10  Neuro: Cristopher Zurita PTA, 03/28/21 at 8:48 AM    Electronically signed by Brook Adorno PTA on 3/28/2021 at 8:48 AM        Definitions for assistance levels  Independent = pt does not require any physical supervision or assistance from another person for activity completion. Device may be needed.   Stand by assistance = pt requires verbal cues or instructions from another person, close to but not touching, to perform the activity  Minimal assistance= pt performs 75% or more of the activity; assistance is required to complete the activity  Moderate assistance= pt performs 50% of the activity; assistance is required to complete the activity  Maximal assistance = pt performs 25% of the activity; assistance is required to complete the activity  Dependent = pt requires total physical assistance to accomplish the task

## 2021-03-28 NOTE — PROGRESS NOTES
Infectious Diseases Inpatient Progress Note          HISTORY OF PRESENT ILLNESS:  Follow up ESBL E. coli UTI and aspiration pneumonia of bilateral lower lobe status post successful resuscitation for cardiac arrest on IV meropenem, well tolerated. Patient continues to improve. Decreased obtundation. Tolerating modified diet. decrease generalized weakness, improved speech and decreased cough. Clear urine in the [place] with minimal sediment. No diarrhea reported.   Current Medications:     aspirin  81 mg Oral Daily    metOLazone  5 mg Oral Once    verapamil  80 mg Oral BID    insulin lispro  0-12 Units Subcutaneous TID WC    insulin lispro  0-6 Units Subcutaneous Nightly    insulin glargine  20 Units Subcutaneous Nightly    lidocaine  5 mL Intradermal Once    sodium chloride flush  10 mL Intravenous 2 times per day    carvedilol  25 mg Oral BID    cloNIDine  0.2 mg Oral BID    meropenem  1,000 mg Intravenous Q12H    nitroglycerin  1 inch Topical 4 times per day    methylPREDNISolone  40 mg Intravenous Daily    pantoprazole  40 mg Intravenous Daily    And    sodium chloride (PF)  10 mL Intravenous Daily    heparin (porcine)  5,000 Units Subcutaneous 3 times per day    docusate  100 mg Oral BID    polyethylene glycol  17 g Oral Daily    budesonide  0.5 mg Nebulization BID    chlorhexidine  15 mL Mouth/Throat BID    sodium chloride flush  10 mL Intravenous 2 times per day    ipratropium-albuterol  1 ampule Inhalation 4x daily       Allergies:  Ibuprofen, Metformin and related, and Darvon [propoxyphene hcl]      Review of Systems  14 ROS is negative O/W      Physical Exam  Vitals:    03/28/21 0900 03/28/21 0930 03/28/21 1000 03/28/21 1030   BP: (!) 173/55 (!) 166/59 (!) 178/57 (!) 173/57   Pulse: 86 80 81 81   Resp: 27 22 (!) 35 27   Temp:   97.9 °F (36.6 °C)    TempSrc:   Bladder    SpO2: 100% 100% 100% 100%   Weight:       Height:         General Appearance: alert and oriented to person and place, well-developed and well-nourished, in no acute distress, on 2 L nasal cannula  Follows commands appropriately  Decreased motor power in all extremities  Decreased bilateral upper extremity edema  Skin: warm and dry, no rash. Head: normocephalic and atraumatic  Eyes: anicteric sclerae  ENT: oropharynx clear and moist with normal mucous membranes.  No oral thrush  Lungs: normal respiratory effort, resolved rhonchi, diminished breath sounds bilateral bases with few rales  Heart: Normal S1-S2 no murmur  Abdomen: soft, no tenderness  No leg edema  No erythema, no tenderness  Clear urine in [place]    DATA:    Lab Results   Component Value Date    WBC 9.2 03/28/2021    HGB 10.2 (L) 03/28/2021    HCT 31.0 (L) 03/28/2021    MCV 90.4 03/28/2021     (H) 03/28/2021     Lab Results   Component Value Date    CREATININE 1.42 (H) 03/28/2021    BUN 53 (H) 03/28/2021     (H) 03/28/2021    K 4.8 03/28/2021     (H) 03/28/2021    CO2 25 03/28/2021       Hepatic Function Panel:  Lab Results   Component Value Date    ALKPHOS 97 03/28/2021    ALT 26 03/28/2021    AST 16 03/28/2021    PROT 6.2 03/28/2021    BILITOT <0.2 03/28/2021    BILIDIR <0.2 01/19/2019    IBILI see below 01/19/2019    LABALBU 2.9 03/28/2021       IMPRESSION:    · ESBL E. coli UTI  · Aspiration pneumonia of bilateral lower lungs  · Cardiac arrest with anoxic encephalopathy  · Acute respiratory failure    Patient Active Problem List   Diagnosis    Hypertension    Hyperlipidemia    Uncontrolled type 2 diabetes mellitus with complication, without long-term current use of insulin (HCC)    Fibromyalgia    Anxiety    Smoker    Insomnia    DJD (degenerative joint disease), lumbar    Lumbosacral radiculopathy at S1    DDD (degenerative disc disease), lumbar    Dysphonia    CTS (carpal tunnel syndrome)    High risk medication use-Norco - 12/20/17 [facility] PM&R, 02/20/18 [facility] PM&R, 03/07/18 [facility] PM&R, Urine Drug screen negative 02/06/17 PM&R--MED CONTRACT 2/6/17    SOB (shortness of breath) on exertion    Chest pain    Memory deficit    Artificial lens present    Presbyopia    Astigmatism, regular    Cataract, nuclear sclerotic senile    IDDM (insulin dependent diabetes mellitus)    Regular astigmatism    Nuclear senile cataract    Neck pain    Lumbosacral radiculopathy at L5    Spinal stenosis of lumbar region with neurogenic claudication    Impaired mobility and activities of daily living    Non-compliant patient NO showed FU 1/10/17 Dr Zenobia Jerome Insomnia secondary to chronic pain    Reactive depression    Diabetic radiculopathy (HCC)    Cervical radicular pain    Diabetic asymmetric polyneuropathy (HCC)    Myalgia    Intercostal neuropathy    Osteoarthritis of spine with radiculopathy, lumbar region    Acute combined systolic and diastolic CHF, NYHA class 1 ([facility])    PMB (postmenopausal bleeding)    Acute on chronic diastolic heart failure (HCC)    CHF (congestive heart failure) ([facility])    Hypertensive urgency    Uncontrolled type 2 diabetes mellitus with hyperglycemia (HCC)    Chronic obstructive pulmonary disease with acute exacerbation (HCC)    Acute on chronic diastolic (congestive) heart failure ([address]    SAÚL (acute kidney injury) ([address]    Hypoglycemia    HOCM (hypertrophic obstructive cardiomyopathy) ([facility])    Gastrointestinal hemorrhage    COPD exacerbation (HCC)    Anemia    AVM (arteriovenous malformation)    Symptomatic anemia    Flash pulmonary edema (HCC)    Acute on chronic kidney failure (HCC)    Dysarthria    Chronic fatigue    Acute encephalopathy    Adenomatous polyp of sigmoid colon    Adenomatous polyp of transverse colon    Sepsis ([facility])    Major depressive disorder in remission ([address]    Gastroesophageal reflux disease without esophagitis    Acute cystitis without hematuria    CKD (chronic kidney disease) stage 4, GFR 15-29 ml/min (HCC)    Cardiopulmonary arrest (HCC)    Gait abnormality

## 2021-03-28 NOTE — PROGRESS NOTES
[facility] AT [place] Respiratory Therapy Evaluation   Current Order:  Duo qid       Home Regimen: n      Ordering Physician: william  Re-evaluation Date:  3/31     Diagnosis: arrest      Patient Status: Stable / Unstable + Physician notified    The following MDI Criteria must be met in order to convert aerosol to MDI with spacer. If unable to meet, MDI will be converted to aerosol:  []  Patient able to demonstrate the ability to use MDI effectively  []  Patient alert and cooperative  []  Patient able to take deep breath with 5-10 second hold  []  Medication(s) available in this delivery method   []  Peak flow greater than or equal to 200 ml/min            Current Order Substituted To  (same drug, same frequency)   Aerosol to MDI [] Albuterol Sulfate 0.083% unit dose by aerosol Albuterol Sulfate MDI 2 puffs by inhalation with spacer    [] Levalbuterol 1.25 mg unit dose by aerosol Levalbuterol MDI 2 puffs by inhalation with spacer    [] Levalbuterol 0.63 mg unit dose by aerosol Levalbuterol MDI 2 puffs by inhalation with spacer    [] Ipratropium Bromide 0.02% unit dose by aerosol Ipratropium Bromide MDI 2 puffs by inhalation with spacer    [] Duoneb (Ipratropium + Albuterol) unit dose by aerosol Ipratropium MDI + Albuterol MDI 2 puffs by inhalation w/spacer   MDI to Aerosol [] Albuterol Sulfate MDI Albuterol Sulfate 0.083% unit dose by aerosol    [] Levalbuterol MDI 2 puffs by inhalation Levalbuterol 1.25 mg unit dose by aerosol    [] Ipratropium Bromide MDI by inhalation Ipratropium Bromide 0.02% unit dose by aerosol    [] Combivent (Ipratropium + Albuterol) MDI by inhalation Duoneb (Ipratropium + Albuterol) unit dose by aerosol       Treatment Assessment [Frequency/Schedule]:  Change frequency to: _______duo tid & alb q2 prn pulmicort___________________________________________per Protocol, P&T, MEC      Points 0 1 2 3 4   Pulmonary Status  Non-Smoker  []   Smoking history   < 20 pack years  []   Smoking history  ?  21 pack years  []   Pulmonary Disorder  (acute or chronic)  [x]   Severe or Chronic w/ Exacerbation  []     Surgical Status No [x]   Surgeries     General []   Surgery Lower []   Abdominal Thoracic or []   Upper Abdominal Thoracic with  PulmonaryDisorder  []     Chest X-ray Clear/Not  Ordered     [x]  Chronic Changes  Results Pending  []  Infiltrates, atelectasis, pleural effusion, or edema  []  Infiltrates in more than one lobe []  Infiltrate + Atelectasis, &/or pleural effusion  []    Respiratory Pattern Regular,  RR = 12-20 []  Increased,  RR = 21-25 [x]  MENDEZ, irregular,  or RR = 26-30 []  Decreased FEV1  or RR = 31-35 []  Severe SOB, use  of accessory muscles, or RR ? 35  []    Mental Status Alert, oriented,  Cooperative [x]  Confused but Follows commands []  Lethargic or unable to follow commands []  Obtunded  []  Comatose  []    Breath Sounds Clear to  auscultation  []  Decreased unilaterally or  in bases only []  Decreased  bilaterally  [x]  Crackles or intermittent wheezes []  Wheezes []    Cough Strong, Spontan., & nonproductive [x]  Strong,  spontaneous, &  productive []  Weak,  Nonproductive []  Weak, productive or  with wheezes []  No spontaneous  cough or may require suctioning []    Level of Activity Ambulatory []  Ambulatory w/ Assist  []  Non-ambulatory [x]  Paraplegic []  Quadriplegic []    Total    Score:___8____     Triage Score:__4_____      Tri       Triage:     1. (>20) Freq: Q3    2. (16-20) Freq: Q4   3. (11-15) Freq: QID & Albuterol Q2 PRN    4. (6-10) Freq: TID & Albuterol Q2 PRN    5. (0-5) Freq Q4prn

## 2021-03-28 NOTE — PROGRESS NOTES
Progress Note  Patient: Shakeel Mac  Unit/Bed: IC05/IC05-01  YOB: 1945  MRN: [id number]  Acct: [de-identified]   Admitting Diagnosis: Cardiac arrest [facility]) [I46.9]  Admit Date:  3/18/2021  Hospital Day: 10    Chief Complaint: CPA    Histories:  Past Medical History:   Diagnosis Date    Anxiety     Asthma     dx 2019 / has smoked since age 14    CHF (congestive heart failure) (HCC)     Chronic back pain     Bilateral L5 S1 Radic on emg--surprisingly worse on the left than the right--pt's symptoms and her MRI show worse on the right    Chronic obstructive pulmonary disease with acute exacerbation ([facility] 10/12/2019    Depression     Fibromyalgia     Hyperlipidemia     meds > 8 yrs    Hypertension     meds > 45 yrs    On home O2     2l per n/c at bedtime mostly,     Osteoarthritis     Type II diabetes mellitus, uncontrolled ([facility]     hx > 8 yrs    Unspecified sleep apnea      Past Surgical History:   Procedure Laterality Date    BACK SURGERY  2017    lumbar disc    CARDIAC CATHETERIZATION  11/3/14     DR. MIRELES / no stents    COLONOSCOPY  08/29/2016    w/polypectomy     COLONOSCOPY N/A 9/29/2020    COLONOSCOPY WITH POLYPECTOMY performed by Alfonso Coleman MD at [facility] N/A 10/7/2019    EUA HYSTEROSCOPY DILATATION AND CURETTAGE performed by Hafsa Alvarez DO at [facility], DIAGNOSTIC      EYE SURGERY      [place] with IOL OU / [address] [place]  [address]    umbilical hernia repair    ND ESOPHAGOGASTRODUODENOSCOPY TRANSORAL DIAGNOSTIC N/A 3/24/2017    EGD ESOPHAGOGASTRODUODENOSCOPY performed by Alayna Saunders MD at [facility] 2/8/2018    negative findings    ND REVISE MEDIAN N/CARPAL TUNNEL SURG Left 6/5/2017    LEFT  CARPAL TUNNEL RELEASE performed [facility] Georgette Beck [facility] at [facility] FBX,3+E/W,KMIFX N/A 2/8/2018    TONSILLECTOMY      as child  UPPER GASTROINTESTINAL ENDOSCOPY  2016    w/bx     UPPER GASTROINTESTINAL ENDOSCOPY N/A 2020    EGD possible biopsy performed by James Orourke MD at [address] N/A 2020    EGD PUSH ENTEROSCOPY performed [facility] Cordelia Howard MD at [facility]     Family History   Problem Relation Age of Onset    Heart Disease Father         cardiac bypass    Arthritis Father     Arthritis Mother     Other Mother          at age 80    Other Sister         nuknown health hx    No Known Problems Daughter     Stroke Son      Social History     Socioeconomic History    Marital status:      Spouse name: Michael Lynn Number of children: 2    Years of education: 15    Highest education level: High school graduate   Occupational History    Occupation: Retired-   Social Needs    Financial resource strain: Not hard at all   [place] insecurity     Worry: Never true     Inability: Never true    Transportation needs     Medical: No     Non-medical: No   Tobacco Use    Smoking status: Former Smoker     Packs/day: 1.00     Years: 59.00     Pack years: 59.00     Types: Cigarettes     Start date: 2017    Smokeless tobacco: Never Used    Tobacco comment: quit 2-3 weeks ago    Substance and Sexual Activity    Alcohol use: Not Currently    Drug use: No    Sexual activity: Yes     Partners: Male   Lifestyle    Physical activity     Days per week: 0 days     Minutes per session: 0 min    Stress: Only a little   Relationships    Social connections     Talks on phone: More than three times a week     Gets together: More than three times a week     Attends Faith service: 1 to 4 times per year     Active member of club or organization: No     Attends meetings of clubs or organizations: Never     Relationship status: Living with partner   Shana Nat Intimate partner violence     Fear of current or ex partner: No     Emotionally abused:  No Physically abused: No     Forced sexual activity: No   Other Topics Concern    None   Social History Narrative    Grew up in [place]     Lives With:  [id number] Spouse    Type of Home: House    Home Layout: Two level, Performs ADL's on one level    Home Access: Stairs to enter with rails    Entrance Stairs - Number of Steps: 4    Bathroom Shower/Tub: Tub/Shower unit, Doors    Bathroom Equipment: Shower chair, Grab bars in [address]: Rolling walker, Cane, Oxygen(uses her O2 very seldom)    ADL Assistance: Needs assistance    Homemaking Assistance: Needs assistance    Homemaking Responsibilities: No(spouse performs)    Ambulation Assistance: Independent    Transfer Assistance: Independent    Active : No    Occupation: Retired    Type of occupation: worked in [employer]: patient enjoys televsision       Subjective/HPI doing well post extubation on Friday. Hoarse. Thirsty.  in room. doing well  EKG: ST 78          Review of Systems:   Review of Systems   Constitutional: Negative. Negative for diaphoresis and fatigue. HENT: Positive for sore throat. Eyes: Negative. Respiratory: Negative. Negative for cough, chest tightness, shortness of breath, wheezing and stridor. Cardiovascular: Negative. Negative for chest pain, palpitations and leg swelling. Gastrointestinal: Negative. Negative for blood in stool and nausea. Genitourinary: Negative. Musculoskeletal: Negative. Skin: Negative. Neurological: Positive for weakness. Negative for dizziness, syncope and light-headedness. Hematological: Negative. Psychiatric/Behavioral: Negative. Physical Examination:    BP (!) 173/57   Pulse 81   Temp 97.9 °F (36.6 °C) (Bladder)   Resp 27   Ht 4' 11\" (1.499 m)   Wt 153 lb 3.5 oz (69.5 kg)   LMP  (LMP Unknown)   SpO2 100%   BMI 30.95 kg/m²    Physical Exam   Constitutional: She appears healthy. No distress.    HENT:   Normal cephalic and Atraumatic Eyes: Pupils are equal, round, and reactive to light. Neck: Normal range of motion and thyroid normal. Neck supple. No JVD present. No neck adenopathy. No thyromegaly present. Cardiovascular: Normal rate, regular rhythm, intact distal pulses and normal pulses. Murmur heard. Pulmonary/Chest: Effort normal and breath sounds normal. She has no wheezes. She has no rales. She exhibits no tenderness. Abdominal: Soft. Bowel sounds are normal. There is no abdominal tenderness. Musculoskeletal: Normal range of motion. General: No tenderness or edema. Neurological: She is alert and oriented to person, place, and time. Skin: Skin is warm. No cyanosis. Nails show no clubbing.        LABS:  CBC:   Lab Results   Component Value Date    WBC 9.2 03/28/2021    RBC 3.43 03/28/2021    HGB 10.2 03/28/2021    HCT 31.0 03/28/2021    MCV 90.4 03/28/2021    MCH 29.6 03/28/2021    MCHC 32.8 03/28/2021    RDW 16.9 03/28/2021     03/28/2021    MPV 8.8 08/01/2015     CBC with Differential:    Lab Results   Component Value Date    WBC 9.2 03/28/2021    RBC 3.43 03/28/2021    HGB 10.2 03/28/2021    HCT 31.0 03/28/2021     03/28/2021    MCV 90.4 03/28/2021    MCH 29.6 03/28/2021    MCHC 32.8 03/28/2021    RDW 16.9 03/28/2021    NRBC 1 08/28/2020    LYMPHOPCT 11.0 03/28/2021    MONOPCT 10.2 03/28/2021    BASOPCT 0.3 03/28/2021    MONOSABS 0.9 03/28/2021    LYMPHSABS 1.0 03/28/2021    EOSABS 0.0 03/28/2021    BASOSABS 0.0 03/28/2021     CMP:    Lab Results   Component Value Date     03/28/2021    K 4.8 03/28/2021    K 4.4 10/19/2020     03/28/2021    CO2 25 03/28/2021    BUN 53 03/28/2021    CREATININE 1.42 03/28/2021    GFRAA 43.4 03/28/2021    LABGLOM 35.9 03/28/2021    GLUCOSE 175 03/28/2021    PROT 6.2 03/28/2021    LABALBU 2.9 03/28/2021    CALCIUM 10.2 03/28/2021    BILITOT <0.2 03/28/2021    ALKPHOS 97 03/28/2021    AST 16 03/28/2021    ALT 26 03/28/2021     BMP:    Lab Results   Component Value Date     03/28/2021    K 4.8 03/28/2021    K 4.4 10/19/2020     03/28/2021    CO2 25 03/28/2021    BUN 53 03/28/2021    LABALBU 2.9 03/28/2021    CREATININE 1.42 03/28/2021    CALCIUM 10.2 03/28/2021    GFRAA 43.4 03/28/2021    LABGLOM 35.9 03/28/2021    GLUCOSE 175 03/28/2021     Magnesium:    Lab Results   Component Value Date    MG 2.4 03/28/2021     Troponin:    Lab Results   Component Value Date    TROPONINI 0.056 03/19/2021        Active Hospital Problems    Diagnosis Date Noted    Acute respiratory failure with hypoxia ([facility]) [J96.01]      Priority: Low    Cardiac arrest ([facility] [I46.9] 03/18/2021     Priority: Low        Assessment/Plan:  1. CPA   2. Respiratory failure-extubated. Stable  3. Encephalopathy-resolved  4. CAD - LAD mild - will resume low dose ASA  5. HTN-much improved  6. LVEF 55%  7. Echo Severe LVH. 1-2+ MR RVSP 31 mmHg. LVEF 50%  8. History of HCM - need to Keep HR close to 60s. Continue and nitrate. We tera change to PO form soon. 9. SAÚL - Nephrology following- stable   10. Accelerated HTN- adjust meds.    11. OK for 1W Telemetry      Electronically signed by Della Meier MD on 3/28/2021 at 12:28 PM

## 2021-03-28 NOTE — PROGRESS NOTES
Renal Progress Note    Assessment and Plan:    1. Resolving acute kidney injury   2. Tonic kidney disease stage III secondary to diabetic nephropathy   3.   Electrolyte imbalance in the form of hyper natremia with calculated water deficit approximately 3 L hospital-acquired with net negative fluid balance approximately 3 L    Plan   DW 5 100 cc/h x 15 hours then reassess  Metolazone 5 mg x 1 dose      Patient Active Problem List:     Hypertension     Hyperlipidemia     Uncontrolled type 2 diabetes mellitus with complication, without long-term current use of insulin (HCC)     Fibromyalgia     Anxiety     Smoker     Insomnia     DJD (degenerative joint disease), lumbar     Lumbosacral radiculopathy at S1     DDD (degenerative disc disease), lumbar     Dysphonia     CTS (carpal tunnel syndrome)     High risk medication use-Norco - 12/20/17 OARRS PM&R, 02/20/18 OARRS PM&R, 03/07/18 OARRS PM&R, Urine Drug screen negative 02/06/17 PM&R--MED CONTRACT 2/6/17     SOB (shortness of breath) on exertion     Chest pain     Memory deficit     Artificial lens present     Presbyopia     Astigmatism, regular     Cataract, nuclear sclerotic senile     IDDM (insulin dependent diabetes mellitus)     Regular astigmatism     Nuclear senile cataract     Neck pain     Lumbosacral radiculopathy at L5     Spinal stenosis of lumbar region with neurogenic claudication     Impaired mobility and activities of daily living     Non-compliant patient NO showed FU 1/10/17 Dr Eden Screen     Insomnia secondary to chronic pain     Reactive depression     Diabetic radiculopathy (HCC)     Cervical radicular pain     Diabetic asymmetric polyneuropathy (HCC)     Myalgia     Intercostal neuropathy     Osteoarthritis of spine with radiculopathy, lumbar region     Acute combined systolic and diastolic CHF, NYHA class 1 (HCC)     PMB (postmenopausal bleeding)     Acute on chronic diastolic heart failure (HCC)     CHF (congestive heart failure) ([facility] [address] Hypertensive urgency     Uncontrolled type 2 diabetes mellitus with hyperglycemia (HCC)     Chronic obstructive pulmonary disease with acute exacerbation (HCC)     Acute on chronic diastolic (congestive) heart failure (HCC)     SAÚL (acute kidney injury) ([address]     Hypoglycemia     HOCM (hypertrophic obstructive cardiomyopathy) (HCC)     Gastrointestinal hemorrhage     COPD exacerbation (HCC)     Anemia     AVM (arteriovenous malformation)     Symptomatic anemia     Flash pulmonary edema (HCC)     Acute on chronic kidney failure (HCC)     Dysarthria     Chronic fatigue     Acute encephalopathy     Adenomatous polyp of sigmoid colon     Adenomatous polyp of transverse colon     Sepsis (HCC)     Major depressive disorder in remission ([address]     Gastroesophageal reflux disease without esophagitis     Acute cystitis without hematuria     CKD (chronic kidney disease) stage 4, GFR 15-29 ml/min ([facility])     Cardiopulmonary arrest ([address]     Gait abnormality     Late effects of CVA (cerebrovascular accident)     Cardiac arrest ([facility] [address]     Acute respiratory failure with hypoxia ([address]      Subjective:   Admit Date: 3/18/2021    Interval History: Alert oriented follow command in no apparent pain or distress denied any uremic related or fluid volume overload related symptoms expressed feeling better compared to yesterday    Medications:   Scheduled Meds:   aspirin  81 mg Oral Daily    verapamil  80 mg Oral BID    insulin lispro  0-12 Units Subcutaneous TID WC    insulin lispro  0-6 Units Subcutaneous Nightly    insulin glargine  20 Units Subcutaneous Nightly    lidocaine  5 mL Intradermal Once    sodium chloride flush  10 mL Intravenous 2 times per day    carvedilol  25 mg Oral BID    cloNIDine  0.2 mg Oral BID    meropenem  1,000 mg Intravenous Q12H    nitroglycerin  1 inch Topical 4 times per day    methylPREDNISolone  40 mg Intravenous Daily    pantoprazole  40 mg Intravenous Daily    And    sodium chloride (PF)  10 mL Intravenous Daily    heparin (porcine)  5,000 Units Subcutaneous 3 times per day    docusate  100 mg Oral BID    polyethylene glycol  17 g Oral Daily    budesonide  0.5 mg Nebulization BID    chlorhexidine  15 mL Mouth/Throat BID    sodium chloride flush  10 mL Intravenous 2 times per day    ipratropium-albuterol  1 ampule Inhalation 4x daily     Continuous Infusions:   sodium chloride      dextrose      sodium chloride      dexmedetomidine Stopped (03/26/21 0900)       CBC:   Recent Labs     03/27/21  0518 03/28/21  0504   WBC 16.2* 9.2   HGB 10.9* 10.2*   * 460*     CMP:    Recent Labs     03/26/21  0649 03/27/21  0518 03/28/21  0504   * 148* 149*   K 4.2 5.1* 4.8   * 113* 115*   CO2 23 19* 25   BUN 68* 60* 53*   CREATININE 1.75* 1.48* 1.42*   GLUCOSE 295* 85 175*   CALCIUM 9.0 9.6 10.2*   LABGLOM 28.2* 34.2* 35.9*     Troponin: No results for input(s): TROPONINI in the last 72 hours. BNP: No results for input(s): BNP in the last 72 hours. INR:   Recent Labs     03/28/21  0504   INR 1.1     Lipids: No results for input(s): CHOL, LDLDIRECT, TRIG, HDL, AMYLASE, LIPASE in the last 72 hours. Liver:   Recent Labs     03/28/21  0504   AST 16   ALT 26   ALKPHOS 97   PROT 6.2*   LABALBU 2.9*   BILITOT <0.2     Iron:  No results for input(s): IRONS, FERRITIN in the last 72 hours. Invalid input(s): LABIRONS  Urinalysis: No results for input(s): UA in the last 72 hours.     Objective:   Vitals: BP (!) 173/57   Pulse 81   Temp 97.9 °F (36.6 °C) (Bladder)   Resp 27   Ht 4' 11\" (1.499 m)   Wt 153 lb 3.5 oz (69.5 kg)   LMP  (LMP Unknown)   SpO2 100%   BMI 30.95 kg/m²    Wt Readings from Last 3 Encounters:   03/28/21 153 lb 3.5 oz (69.5 kg)   01/15/21 131 lb (59.4 kg)   11/27/20 120 lb (54.4 kg)      24HR INTAKE/OUTPUT:      Intake/Output Summary (Last 24 hours) at 3/28/2021 1311  Last data filed at 3/28/2021 0550  Gross per 24 hour   Intake 900 ml   Output 1175 ml   Net -275 ml Constitutional:  Alert, awake, no apparent distress   Skin:normal, no rash  HEENT:sclera anicteric.   Head atraumatic normocephalic  Neck:supple with no thyromegally  Cardiovascular:  S1, S2 without m/r/g   Respiratory:  CTA B without w/r/r diminished air entry bibasilar  Abdomen: +bs, soft, nt  Ext: No clinically significant LE edema  Musculoskeletal:Intact  Neuro:Alert and oriented with no deficit      Electronically signed by Livia Artis MD on 3/28/2021 at 1:11 PM

## 2021-03-28 NOTE — PROGRESS NOTES
[facility]  Facility/Department: [facility]  Speech Language Pathology   Treatment Note          Argentina Groves  1945  [id number]    Medical Dx: Cardiac arrest [facility]) [I46.9]  Speech Dx: Dysphagia     3/28/2021    Subjective:  Alert, Cooperative and Pleasant        Interventions used this date:  Dysphagia Treatment    Objective/Assessment:  Patient progressing towards goals:  Short-term Goals  Timeframe for Short-term Goals: 1 week  Goal 1: Pt will tolerate puree consistencies and honey thick liquids with no overt s/s of aspiration in all given opportunities. Pt consumed trials of puree solids X2 without overt s/s of aspiration and good oral clearance. Pt presented with lingual pumping and mild increased oral transit time. Goal 2: Pt will tolerate trials of minced and moist consistencies and nectar thick liquids with no overt s/s of aspiration in order to achieve least restrictive diet consistency. Pt was presented with approx 16 trials of nectar thick liquids via spoon and cup. Mild swallow delay with increased oral transit time was noted with all trials. Pt tolerated nectar thick liquids without overt s/s of aspiration. Pt attempted to consume large sips. ST regulated sip size.  reported pt would drink liquids quickly at home and some times choke. ST educated pt and  on need to consume 1 small sip at a time. Both verbalized understanding. ST also educated pt and  on dysphagia after post intubation and CVA. Both verbalized understanding. ST presented moistened crackers X4 with mild increased mastication time with mild increased oral transit time. Good oral clearance was noted. ST recommends upgrade to Minced, moist solids with nectar thick liquids    Pt was presented with 1 trial of thin liquids. Immediate coughing was noted. Poor oral control was noted. ST to continue to follow to determine need for MBS.  ST recommends continued assessment for possible upgrade with

## 2021-03-28 NOTE — PROGRESS NOTES
Shift summary:    Handoff of care at bedside. Patient appears more conversant than this RN's previous shift. Patient is able to follow commands and moves all extremities, asks for water, etc. Patient able to take in applesauce with her crushed pills and honey thickened water. No coughing or aspiration noted. Patient able to take PO with moderate assist. Patient remains on 2 liters nasal canula with good oxygen saturation. Patient refused bi-pap for respiratory therapist for HS. Vital signs remained stable throughout the night. Patient turned q 2 hours. Patient cleaned for incontinence of stool x 2.

## 2021-03-28 NOTE — PROGRESS NOTES
Department of Internal Medicine  General Internal Medicine  Attending Progress Note      SUBJECTIVE:  Pt seen and examined. Awake and alert. No complaints today.  Family deciding on possible hospice    OBJECTIVE      Medications    Current Facility-Administered Medications: aspirin EC tablet 81 mg, 81 mg, Oral, Daily  verapamil (CALAN) tablet 80 mg, 80 mg, Oral, BID  insulin lispro (HUMALOG) injection vial 0-12 Units, 0-12 Units, Subcutaneous, TID WC  insulin lispro (HUMALOG) injection vial 0-6 Units, 0-6 Units, Subcutaneous, Nightly  insulin glargine (LANTUS) injection vial 20 Units, 20 Units, Subcutaneous, Nightly  lidocaine 2 % injection 5 mL, 5 mL, Intradermal, Once  sodium chloride flush 0.9 % injection 10 mL, 10 mL, Intravenous, 2 times per day  sodium chloride flush 0.9 % injection 10 mL, 10 mL, Intravenous, PRN  0.9 % sodium chloride infusion, 250 mL, Intravenous, Once  carvedilol (COREG) tablet 25 mg, 25 mg, Oral, BID  cloNIDine (CATAPRES) tablet 0.2 mg, 0.2 mg, Oral, BID  glucose (GLUTOSE) 40 % oral gel 15 g, 15 g, Oral, PRN  dextrose 50 % IV solution, 12.5 g, Intravenous, PRN  glucagon (rDNA) injection 1 mg, 1 mg, Intramuscular, PRN  dextrose 5 % solution, 100 mL/hr, Intravenous, PRN  meropenem (MERREM) 1,000 mg in sodium chloride 0.9 % 100 mL IVPB (mini-bag), 1,000 mg, Intravenous, Q12H  0.9 % sodium chloride infusion, , Intravenous, PRN  nitroglycerin (NITRO-BID) 2 % ointment 1 inch, 1 inch, Topical, 4 times per day  methylPREDNISolone sodium (SOLU-MEDROL) injection 40 mg, 40 mg, Intravenous, Daily  pantoprazole (PROTONIX) injection 40 mg, 40 mg, Intravenous, Daily **AND** sodium chloride (PF) 0.9 % injection 10 mL, 10 mL, Intravenous, Daily  heparin (porcine) injection 5,000 Units, 5,000 Units, Subcutaneous, 3 times per day  dexmedetomidine (PRECEDEX) 400 mcg in sodium chloride 0.9 % 100 mL infusion, 0.2-1.4 mcg/kg/hr, Intravenous, Continuous  docusate (COLACE) 50 MG/5ML liquid 100 mg, 100 mg, Oral, BID  polyethylene glycol (GLYCOLAX) packet 17 g, 17 g, Oral, Daily  budesonide (PULMICORT) nebulizer suspension 500 mcg, 0.5 mg, Nebulization, BID  hydrALAZINE (APRESOLINE) injection 10 mg, 10 mg, Intravenous, Q4H PRN **OR** hydrALAZINE (APRESOLINE) injection 20 mg, 20 mg, Intravenous, Q4H PRN  labetalol (NORMODYNE;TRANDATE) injection 20 mg, 20 mg, Intravenous, Q4H PRN  labetalol (NORMODYNE;TRANDATE) injection 40 mg, 40 mg, Intravenous, Q4H PRN  promethazine (PHENERGAN) tablet 12.5 mg, 12.5 mg, Oral, Q6H PRN **OR** ondansetron (ZOFRAN) injection 4 mg, 4 mg, Intravenous, Q6H PRN  chlorhexidine (PERIDEX) 0.12 % solution 15 mL, 15 mL, Mouth/Throat, BID  sodium chloride flush 0.9 % injection 10 mL, 10 mL, Intravenous, 2 times per day  sodium chloride flush 0.9 % injection 10 mL, 10 mL, Intravenous, PRN  acetaminophen (TYLENOL) tablet 650 mg, 650 mg, Oral, Q6H PRN **OR** acetaminophen (TYLENOL) suppository 650 mg, 650 mg, Rectal, Q6H PRN  ipratropium-albuterol (DUONEB) nebulizer solution 1 ampule, 1 ampule, Inhalation, 4x daily  Physical    VITALS:  BP (!) 173/57   Pulse 81   Temp 97.9 °F (36.6 °C) (Bladder)   Resp 27   Ht 4' 11\" (1.499 m)   Wt 153 lb 3.5 oz (69.5 kg)   LMP  (LMP Unknown)   SpO2 100%   BMI 30.95 kg/m²   Constitutional: awake and alert today.  Speaking and answering questions  Head: Normocephalic, atraumatic  ENT: moist mucous membranes, BIPAP in place  Neck: neck supple, trachea midline  Lungs: Good inspiratory effort, bilateral rhonchi and crackles  Heart: RRR, normal S1 and S2  GI: Soft, non-distended, +BS  MSK: no edema noted  Skin: warm, dry     Data    CBC:   Lab Results   Component Value Date    WBC 9.2 03/28/2021    RBC 3.43 03/28/2021    HGB 10.2 03/28/2021    HCT 31.0 03/28/2021    MCV 90.4 03/28/2021    MCH 29.6 03/28/2021    MCHC 32.8 03/28/2021    RDW 16.9 03/28/2021     03/28/2021    MPV 8.8 08/01/2015     CMP:    Lab Results   Component Value Date     03/28/2021    K 4.8 03/28/2021    K 4.4 10/19/2020     03/28/2021    CO2 25 03/28/2021    BUN 53 03/28/2021    CREATININE 1.42 03/28/2021    GFRAA 43.4 03/28/2021    LABGLOM 35.9 03/28/2021    GLUCOSE 175 03/28/2021    PROT 6.2 03/28/2021    LABALBU 2.9 03/28/2021    CALCIUM 10.2 03/28/2021    BILITOT <0.2 03/28/2021    ALKPHOS 97 03/28/2021    AST 16 03/28/2021    ALT 26 03/28/2021       ASSESSMENT AND PLAN      # Cardiac arrest s/p ROSC   - initially required Dopamine, norepinephrine and epinephrine infusion  - off pressors and hypothermia protocol  - TTE showed LVEF of 50%  - cardiology following     # Acute hypoxic respiratory failure  - requiring intubation and mechanical ventilation  - extubated 3/22, but had to be re-intubated same day due to stridor and respiratory distress. Continued to have stridor when cuff deflated  - extubated 3/26 to BIPAP. No re-intubation per family. On nasal cannula now  - palliative care consulted  - management per critical care service     # Acute encephalopathy  - likely hypoxic-ischemic etiology from cardiac arrest  - CT head was negative per report  - neurology following     # Acute / chronic anemia - improved  - with Hb of 5.8 on arrival, FOBT was positive  - improved s/p PRBCs given in ED  - on Protonix   - follow H/H      # Lactic acidosis  - in the setting of cardiac arrest  - improved with volume repletion     # Hyperkalemia  - resolved     # SAÚL/CKD3  - nephrology following     # IDDM2 with hyperglycemia  - improved, off insulin drip, on ISS     # Leukocytosis   - improved     Code status - DNRCCA    Disposition: No re-intubation per family. Off pressors. Les Master for transfer to the floor when ok from intensivist as no critical care needs at this time. Palliative care consulted. Family deciding on hospice.       Mike Lowe, DO  Internal Medicine

## 2021-03-28 NOTE — FLOWSHEET NOTE
Patient worked with PT OT. SLP rounded. Ok for a minced moist diet and nectar thick liquids. Hospice met with  this morning. Patient was thankful and chatty all morning. [facility]place. Follows commands  Moves all extremities. Lungs rhonci bilaterally. Moist cough productive strong. Able to clear and swallow secretions. Patient stood at the side of the bed with PT OT. Ate at least 15 nectar thick juices. Patient c/o extreme hunger and thirst.   No further needs.

## 2021-03-28 NOTE — CARE COORDINATION
I called [facility] hospice ans service re: order written yest at 1700. I let them know that I gave spouse freedom of choice and that he wants a Hospice referral and meeting to decide on hospice for his wife. See note from yest  And now hospice is aware and will give message to on call hospice nurse to contact patients spouse .

## 2021-03-28 NOTE — PATIENT CARE CONFERENCE
Spoke with Dgtr Alejandra Narvaez. Aware of transfer to 1w. Aware of POC. Thankful.  Cassi Echevarria aware of transfer. Updated at bedside.  spoke with hospice.      Electronically signed by Argentina Multani RN on 3/28/2021 at 3:45 PM

## 2021-03-28 NOTE — PROGRESS NOTES
1205: Met with  Enrique Rivera to review hospice philosophy and medicare benefit. Enrique Rivera states he is just obtaining information at this time, not ready to make decision about hospice; considering rehab at local SNF. States he wants to see \"how things go\". Hospice booklet and phone number given, he is aware he can call [facility] 24/7 with any questions or concerns. Staff updated.

## 2021-03-28 NOTE — PROGRESS NOTES
Neurology Follow up    SUBJECTIVE: Patient is extubated and off sedation day 2. She is more alert today she is sitting with her son she was able to eat applesauce without any difficulty and is now able to follow commands quite well. Still difficulty with short-term memory but a significant improvement than days previous. ,     Current Facility-Administered Medications   Medication Dose Route Frequency Provider Last Rate Last Admin    aspirin EC tablet 81 mg  81 mg Oral Daily Amy Chanel MD        dextrose 5 % solution   Intravenous Continuous Matias Triana MD        metOLazone (ZAROXOLYN) tablet 5 mg  5 mg Oral Once Matias Triana MD        verapamil (CALAN) tablet 80 mg  80 mg Oral BID Amy Chanel MD   80 mg at 03/28/21 0957    insulin lispro (HUMALOG) injection vial 0-12 Units  0-12 Units Subcutaneous TID WC Andree Gonzalez MD   6 Units at 03/28/21 1149    insulin lispro (HUMALOG) injection vial 0-6 Units  0-6 Units Subcutaneous Nightly Andree Gonzalez MD   6 Units at 03/27/21 2059    insulin glargine (LANTUS) injection vial 20 Units  20 Units Subcutaneous Nightly Andree Gonzalez MD   20 Units at 03/27/21 2136    lidocaine 2 % injection 5 mL  5 mL Intradermal Once Jonah Sewell MD        sodium chloride flush 0.9 % injection 10 mL  10 mL Intravenous 2 times per day Jonah Sewell MD   10 mL at 03/28/21 0957    sodium chloride flush 0.9 % injection 10 mL  10 mL Intravenous PRN Jonah Sewell MD        0.9 % sodium chloride infusion  250 mL Intravenous Once Jonah Sewell MD        carvedilol (COREG) tablet 25 mg  25 mg Oral BID Jonah Sewell MD   25 mg at 03/28/21 0957    cloNIDine (CATAPRES) tablet 0.2 mg  0.2 mg Oral BID Jonah Sewell MD   0.2 mg at 03/28/21 0957    glucose (GLUTOSE) 40 % oral gel 15 g  15 g Oral PRN Jonah Sewell MD        dextrose 50 % IV solution  12.5 g Intravenous PRN Jonah Sewell MD        glucagon (rDNA) injection 1 mg  1 mg Intramuscular PRN Manaf Rosemarie Wright MD        dextrose 5 % solution  100 mL/hr Intravenous PRN Eric Rome MD        meropenem (MERREM) 1,000 mg in sodium chloride 0.9 % 100 mL IVPB (mini-bag)  1,000 mg Intravenous Q12H Harriet Mcmahon MD   Stopped at 03/28/21 1030    0.9 % sodium chloride infusion   Intravenous PRN Anastasiya Distance, DO        nitroglycerin (NITRO-BID) 2 % ointment 1 inch  1 inch Topical 4 times per day Samuel Chisholm MD   1 inch at 03/28/21 0539    methylPREDNISolone sodium (SOLU-MEDROL) injection 40 mg  40 mg Intravenous Daily Eric Rome MD   40 mg at 03/28/21 0956    pantoprazole (PROTONIX) injection 40 mg  40 mg Intravenous Daily Eric Rome MD   40 mg at 03/28/21 0956    And    sodium chloride (PF) 0.9 % injection 10 mL  10 mL Intravenous Daily Eric Rome MD   10 mL at 03/28/21 0957    heparin (porcine) injection 5,000 Units  5,000 Units Subcutaneous 3 times per day Eric Rome MD   5,000 Units at 03/28/21 0540    dexmedetomidine (PRECEDEX) 400 mcg in sodium chloride 0.9 % 100 mL infusion  0.2-1.4 mcg/kg/hr Intravenous Continuous Eric Rome MD   Stopped at 03/26/21 0900    docusate (COLACE) 50 MG/5ML liquid 100 mg  100 mg Oral BID Eric Rome MD   Stopped at 03/26/21 2008    polyethylene glycol (GLYCOLAX) packet 17 g  17 g Oral Daily Eric Rome MD   Stopped at 03/27/21 0803    budesonide (PULMICORT) nebulizer suspension 500 mcg  0.5 mg Nebulization BID Eric Rome MD   500 mcg at 03/28/21 0407    hydrALAZINE (APRESOLINE) injection 10 mg  10 mg Intravenous Q4H PRN Oletha Pen Sedar, DO   10 mg at 03/26/21 2321    Or    hydrALAZINE (APRESOLINE) injection 20 mg  20 mg Intravenous Q4H PRN Oletha Pen Sedar, DO   20 mg at 03/25/21 2051    labetalol (NORMODYNE;TRANDATE) injection 20 mg  20 mg Intravenous Q4H PRN Lydia Nab, DO   20 mg at 03/24/21 1227    labetalol (NORMODYNE;TRANDATE) injection 40 mg  40 mg Intravenous Q4H PRN Lydia Nab, DO   40 mg at 03/27/21 1302    promethazine (PHENERGAN) tablet 12.5 mg  12.5 mg Oral Q6H PRN Coral Robin MD        Or    ondansetron (ZOFRAN) injection 4 mg  4 mg Intravenous Q6H PRN Coral Robin MD        chlorhexidine (PERIDEX) 0.12 % solution 15 mL  15 mL Mouth/Throat BID Coral Robin MD   Stopped at 03/27/21 1151    sodium chloride flush 0.9 % injection 10 mL  10 mL Intravenous 2 times per day Coral Robin MD   10 mL at 03/28/21 0958    sodium chloride flush 0.9 % injection 10 mL  10 mL Intravenous PRN Coral Robin MD        acetaminophen (TYLENOL) tablet 650 mg  650 mg Oral Q6H PRN Coral Robin MD   650 mg at 03/25/21 2153    Or    acetaminophen (TYLENOL) suppository 650 mg  650 mg Rectal Q6H PRN Coral Robin MD   650 mg at 03/25/21 0153    ipratropium-albuterol (DUONEB) nebulizer solution 1 ampule  1 ampule Inhalation 4x daily Thanh Howe MD   1 ampule at 03/28/21 0943       PHYSICAL EXAM:    BP (!) 173/57   Pulse 81   Temp 97.9 °F (36.6 °C) (Bladder)   Resp 27   Ht 4' 11\" (1.499 m)   Wt 153 lb 3.5 oz (69.5 kg)   LMP  (LMP Unknown)   SpO2 100%   BMI 30.95 kg/m²   General Appearance:      Skin:  normal  CVS - Normal sounds, No murmurs , No carotid Bruits  RS -CTA  Abdomen Soft, BS present  Review of Systems   Unable to perform ROS: Mental status change   Constitutional: Negative for chills, fever and unexpected weight change. HENT: Positive for trouble swallowing and voice change. Negative for congestion. Eyes: Negative for photophobia and visual disturbance. Respiratory: Negative for chest tightness, shortness of breath and wheezing. Cardiovascular: Negative for chest pain and leg swelling. Gastrointestinal: Negative for diarrhea, nausea and vomiting. Endocrine: Negative. Genitourinary: Negative. Musculoskeletal: Negative for back pain, gait problem and neck stiffness. Skin: Negative for rash. Allergic/Immunologic: Negative.     Neurological: Negative for dizziness, tremors, Race                Black                                 Ethnicity  Visit Number   [id number]       Room Number         IC05  Corporate ID                   Date of Study       03/19/2021  Accession      [id number]      Referring Physician  Number  Date of Birth  1945      Sonographer         Yary Hoyos LPN  Age            67 year(s)      Faisal Bach  Cardiology                                 Physician           Tye Cesar MD Procedure Type of Study  TTE procedure:ECHO COMPLETE 2D W/DOP W/COLOR. Procedure Date Date: 03/19/2021 Start: 09:15 AM Study Location: Portable Technical Quality: Good visualization Indications:Cardiac arrest. Patient Status: Routine Weight: 143 pounds BP: 89/48 mmHg Allergies   - Other allergy:(Darvon). Conclusions  Summary  Mitral annular calcification is present. 1-2+ MR  Normal tricuspid valve structure and function. 1+ TR  RVSP 31 mmHg  severe CLVH  LVEF 50%  E/A flow reversal noted. Suggestive of diastolic dysfunction. Signature  ----------------------------------------------------------------  Electronically signed by Tye Cesar MD(Interpreting  physician) on 03/19/2021 02:13 PM  ----------------------------------------------------------------  Findings Left Ventricle severe CLVH LVEF 50% E/A flow reversal noted. Suggestive of diastolic dysfunction. Right Ventricle Normal right ventricle structure and function. Normal right ventricle systolic pressure. Left Atrium Moderately dilated left atrium. Right Atrium Normal right atrium. Mitral Valve Mitral annular calcification is present. 1-2+ MR Tricuspid Valve Normal tricuspid valve structure and function. 1+ TR RVSP 31 mmHg Aortic Valve Normal aortic valve structure and function. Pulmonic Valve The pulmonic valve was not well visualized . Pericardial Effusion No evidence of pericardial effusion.  Pleural Effusion No evidence of pleural effusion. Aorta \ Miscellaneous The aorta is within normal limits. M-Mode Measurements (cm)  LVIDd: 4.01 cm                         LVIDs: 1.85 cm  IVSd: 1.45 cm                          IVSs: 1.23 cm  LVPWd: 1.32 cm                         LVPWs: 1.61 cm  Rt. Vent.  Dimension: 1.07 cm           AO Root Dimension: 2.8 cm                                         ACS: 1.47 cm                                         LA: 3.04 cm                                         LVOT: 1.98 cm Doppler Measurements:  AV Velocity:0.01 m/s                   MV Peak E-Wave: 0.96 m/s  AV Peak Gradient: 7.73 mmHg            MV Peak A-Wave: 1.58 m/s  AV Mean Gradient: 3.01 mmHg  AV Area (Continuity):1.27 cm^2         MV P1/2t: 105.6 msec  TR Velocity:2.29 m/s                   MVA by PHT2.08 cm^2  TR Gradient:21.01 mmHg                 Estimated RAP:10 mmHg                                         RVSP:31 mmHg Valves  Mitral Valve  Peak E-Wave: 0.96 m/s          Peak A-Wave: 1.58 m/s  P1/2t: 105.6 msec              E/A Ratio: 0.61                                 Peak Gradient: 3.7 mmHg  Area (PHT): 2.08 cm^2          Deceleration Time: 349.6 msec  MR Velocity: 4.66 m/s  Aortic Valve  Peak Velocity: 1.39 m/s                Mean Velocity: 0.79 m/s  Peak Gradient: 7.73 mmHg               Mean Gradient: 3.01 mmHg  Area (continuity): 1.27 cm^2  AV VTI: 28.28 cm  Cusp Separation: 1.47 cm  Tricuspid Valve  Estimated RVSP: 31 mmHg              Estimated RAP: 10 mmHg  TR Velocity: 2.29 m/s                TR Gradient: 21.01 mmHg  Pulmonic Valve  Peak Velocity: 0.68 m/s           Peak Gradient: 1.86 mmHg                                    Estimated PASP: 31.01 mmHg  LVOT  Peak Velocity: 0.72 m/s              Mean Velocity: 0.41 m/s  Peak Gradient: 2 mmHg                Mean Gradient: 0.9 mmHg  LVOT Diameter: 1.98 cm               LVOT VTI: 11.64 cm Structures  Left Atrium  LA Dimension: 3.04 cm                 LA Area: 18.08 cm^2  LA/Aorta: 1.09  LA Volume/Index: 49.97 ml  Left Ventricle  Diastolic Dimension: 7.88 cm         Systolic Dimension: 9.28 cm  Septum Diastolic: 7.63 cm            Septum Systolic: 4.68 cm  PW Diastolic: 4.44 cm                PW Systolic: 6.17 cm                                       FS: 53.9 %  LV EDV/LV EDV Index: 70.29 ml        LV ESV/LV ESV Index: 10.47 ml  EF Calculated: 85.1 %  LVOT Diameter: 1.98 cm  Right Ventricle  Diastolic Dimension: 0.93 cm                                    RV Systolic Pressure: 49.92 mmHg Aorta/ Miscellaneous Aorta  Aortic Root: 2.8 cm  LVOT Diameter: 1.98 cm    Xr Chest Portable    Result Date: 3/19/2021  EXAMINATION: XR CHEST PORTABLE CLINICAL HISTORY: INTUBATION COMPARISONS: OCTOBER 9, 2020 FINDINGS: Endotracheal tube has migrated 3 cm inferiorly, just lying cephalad to mojgan. Nasogastric tube courses beneath diaphragm. Osseous structures intact. Cardiopericardial silhouette normal. Aorta calcified. Ill-defined area increased opacity right  lung base. RIGHT LOWER LUNG SUBSEGMENTAL ATELECTASIS/PNEUMONIA. INTERVAL INFERIOR MIGRATION ENDOTRACHEAL TUBE 3 CM WITH TIP NOW JUST PROXIMAL TO MOJGAN. MAY WISH TO RETRACT ENDOTRACHEAL TUBE 2 CM. Xr Chest Portable    Result Date: 3/19/2021  EXAMINATION: XR CHEST PORTABLE CLINICAL HISTORY: RESPIRATORY FAILURE COMPARISONS: MARCH 16, 2021 FINDINGS: Endotracheal tube tip 2.3 cm superior to mojgan. Nasogastric tube courses beneath diaphragm area osseous structures intact. Ill-defined areas increase opacity at lung bases. Cardiopericardial silhouette normal.     BIBASILAR SUBSEGMENTAL ATELECTASIS/PNEUMONIA IN THIS PARTIALLY EXPIRATORY CHEST RADIOGRAPH.       Recent Labs     03/26/21  0649 03/27/21  0518 03/28/21  0504   WBC 14.1* 16.2* 9.2   HGB 7.7* 10.9* 10.2*    429* 460*     Recent Labs     03/26/21  0649 03/27/21  0518 03/28/21  0504   * 148* 149*   K 4.2 5.1* 4.8   * 113* 115*   CO2 23 19* 25   BUN 68* 60* 53*   CREATININE 1.75* 1.48* 1.42*   GLUCOSE 295* 85 175*     Recent Labs     03/26/21  0649 03/27/21  0518 03/28/21  0504   BILITOT <0.2 <0.2 <0.2   ALKPHOS 98 100 97   AST 11 26 16   ALT 31 31 26     Lab Results   Component Value Date    PROTIME 14.2 03/28/2021    INR 1.1 03/28/2021     No results found for: LITHIUM, DILFRTOT, VALPROATE    ASSESSMENT AND PLAN  Encephalopathy secondary to cardiorespiratory arrest.  Patient may have suffered some hypoxemic damage though this very difficult to certain as patient is now extubated and off sedation and she does appear to be improving each day. .  We will keep an eye on this repeat CT scan did not anything significant as far as mass lesions or structural lesions no early CT sign changes seen to suggest any acute ischemia and no evidence of any gray-white matter changes to suggest severe hypoxic ischemic changes. The patient does appear to be tolerating diet at this point which we will advance as tolerated. Patient may benefit from rehabilitation stay with cognitive and speech therapy.

## 2021-03-29 ENCOUNTER — APPOINTMENT (OUTPATIENT)
Dept: GENERAL RADIOLOGY | Age: 76
DRG: 296 | End: 2021-03-29
Payer: MEDICARE

## 2021-03-29 ENCOUNTER — APPOINTMENT (OUTPATIENT)
Dept: INTERVENTIONAL RADIOLOGY/VASCULAR | Age: 76
DRG: 296 | End: 2021-03-29
Payer: MEDICARE

## 2021-03-29 LAB
ALBUMIN SERPL-MCNC: 2.9 G/DL (ref 3.5–4.6)
ALP BLD-CCNC: 99 U/L (ref 40–130)
ALT SERPL-CCNC: 28 U/L (ref 0–33)
ANION GAP SERPL CALCULATED.3IONS-SCNC: 8 MEQ/L (ref 9–15)
APTT: 31.1 SEC (ref 24.4–36.8)
AST SERPL-CCNC: 21 U/L (ref 0–35)
BASOPHILS ABSOLUTE: 0 K/UL (ref 0–0.2)
BASOPHILS RELATIVE PERCENT: 0.2 %
BILIRUB SERPL-MCNC: <0.2 MG/DL (ref 0.2–0.7)
BUN BLDV-MCNC: 40 MG/DL (ref 8–23)
CALCIUM IONIZED, CALC AT PH 7.4: 1.44 MMOL/L (ref 1.11–1.3)
CALCIUM IONIZED: 1.48 MMOL/L (ref 1.11–1.3)
CALCIUM SERPL-MCNC: 10 MG/DL (ref 8.5–9.9)
CHLORIDE BLD-SCNC: 106 MEQ/L (ref 95–107)
CO2: 24 MEQ/L (ref 20–31)
CREAT SERPL-MCNC: 1.3 MG/DL (ref 0.5–0.9)
EOSINOPHILS ABSOLUTE: 0 K/UL (ref 0–0.7)
EOSINOPHILS RELATIVE PERCENT: 0.3 %
GFR AFRICAN AMERICAN: 48.1
GFR NON-AFRICAN AMERICAN: 39.8
GLOBULIN: 3 G/DL (ref 2.3–3.5)
GLUCOSE BLD-MCNC: 237 MG/DL (ref 60–115)
GLUCOSE BLD-MCNC: 238 MG/DL (ref 60–115)
GLUCOSE BLD-MCNC: 249 MG/DL (ref 70–99)
GLUCOSE BLD-MCNC: 258 MG/DL (ref 60–115)
GLUCOSE BLD-MCNC: 294 MG/DL (ref 60–115)
GLUCOSE BLD-MCNC: 338 MG/DL (ref 60–115)
HCT VFR BLD CALC: 27.6 % (ref 37–47)
HEMOGLOBIN: 9.1 G/DL (ref 12–16)
INR BLD: 1.1
LYMPHOCYTES ABSOLUTE: 1.3 K/UL (ref 1–4.8)
LYMPHOCYTES RELATIVE PERCENT: 11.6 %
MAGNESIUM: 1.9 MG/DL (ref 1.7–2.4)
MCH RBC QN AUTO: 29.4 PG (ref 27–31.3)
MCHC RBC AUTO-ENTMCNC: 32.9 % (ref 33–37)
MCV RBC AUTO: 89.3 FL (ref 82–100)
MONOCYTES ABSOLUTE: 0.9 K/UL (ref 0.2–0.8)
MONOCYTES RELATIVE PERCENT: 7.4 %
NEUTROPHILS ABSOLUTE: 9.3 K/UL (ref 1.4–6.5)
NEUTROPHILS RELATIVE PERCENT: 80.5 %
PDW BLD-RTO: 16.6 % (ref 11.5–14.5)
PERFORMED ON: ABNORMAL
PHOSPHORUS: 2.6 MG/DL (ref 2.3–4.8)
PLATELET # BLD: 431 K/UL (ref 130–400)
POTASSIUM SERPL-SCNC: 4.5 MEQ/L (ref 3.4–4.9)
PROTHROMBIN TIME: 14.4 SEC (ref 12.3–14.9)
RBC # BLD: 3.08 M/UL (ref 4.2–5.4)
SODIUM BLD-SCNC: 138 MEQ/L (ref 135–144)
TOTAL PROTEIN: 5.9 G/DL (ref 6.3–8)
WBC # BLD: 11.5 K/UL (ref 4.8–10.8)

## 2021-03-29 PROCEDURE — 6360000002 HC RX W HCPCS: Performed by: INTERNAL MEDICINE

## 2021-03-29 PROCEDURE — 94640 AIRWAY INHALATION TREATMENT: CPT

## 2021-03-29 PROCEDURE — 83735 ASSAY OF MAGNESIUM: CPT

## 2021-03-29 PROCEDURE — 85610 PROTHROMBIN TIME: CPT

## 2021-03-29 PROCEDURE — 6370000000 HC RX 637 (ALT 250 FOR IP): Performed by: INTERNAL MEDICINE

## 2021-03-29 PROCEDURE — 94761 N-INVAS EAR/PLS OXIMETRY MLT: CPT

## 2021-03-29 PROCEDURE — 99232 SBSQ HOSP IP/OBS MODERATE 35: CPT | Performed by: INTERNAL MEDICINE

## 2021-03-29 PROCEDURE — 74230 X-RAY XM SWLNG FUNCJ C+: CPT

## 2021-03-29 PROCEDURE — 2580000003 HC RX 258: Performed by: INTERNAL MEDICINE

## 2021-03-29 PROCEDURE — 80053 COMPREHEN METABOLIC PANEL: CPT

## 2021-03-29 PROCEDURE — 2709999900 IR PICC WO SQ PORT/PUMP > 5 YEARS

## 2021-03-29 PROCEDURE — 85730 THROMBOPLASTIN TIME PARTIAL: CPT

## 2021-03-29 PROCEDURE — 85025 COMPLETE CBC W/AUTO DIFF WBC: CPT

## 2021-03-29 PROCEDURE — 84100 ASSAY OF PHOSPHORUS: CPT

## 2021-03-29 PROCEDURE — 2700000000 HC OXYGEN THERAPY PER DAY

## 2021-03-29 PROCEDURE — 92526 ORAL FUNCTION THERAPY: CPT

## 2021-03-29 PROCEDURE — 99231 SBSQ HOSP IP/OBS SF/LOW 25: CPT | Performed by: NURSE PRACTITIONER

## 2021-03-29 PROCEDURE — C9113 INJ PANTOPRAZOLE SODIUM, VIA: HCPCS | Performed by: INTERNAL MEDICINE

## 2021-03-29 PROCEDURE — 82330 ASSAY OF CALCIUM: CPT

## 2021-03-29 PROCEDURE — 5A1955Z RESPIRATORY VENTILATION, GREATER THAN 96 CONSECUTIVE HOURS: ICD-10-PCS | Performed by: INTERNAL MEDICINE

## 2021-03-29 PROCEDURE — 2500000003 HC RX 250 WO HCPCS: Performed by: INTERNAL MEDICINE

## 2021-03-29 PROCEDURE — 2060000000 HC ICU INTERMEDIATE R&B

## 2021-03-29 PROCEDURE — 92611 MOTION FLUOROSCOPY/SWALLOW: CPT

## 2021-03-29 PROCEDURE — 36415 COLL VENOUS BLD VENIPUNCTURE: CPT

## 2021-03-29 PROCEDURE — 36573 INSJ PICC RS&I 5 YR+: CPT | Performed by: RADIOLOGY

## 2021-03-29 PROCEDURE — 97530 THERAPEUTIC ACTIVITIES: CPT

## 2021-03-29 PROCEDURE — 02HV33Z INSERTION OF INFUSION DEVICE INTO SUPERIOR VENA CAVA, PERCUTANEOUS APPROACH: ICD-10-PCS | Performed by: INTERNAL MEDICINE

## 2021-03-29 PROCEDURE — APPSS30 APP SPLIT SHARED TIME 16-30 MINUTES: Performed by: NURSE PRACTITIONER

## 2021-03-29 PROCEDURE — 99233 SBSQ HOSP IP/OBS HIGH 50: CPT | Performed by: PSYCHIATRY & NEUROLOGY

## 2021-03-29 PROCEDURE — 99233 SBSQ HOSP IP/OBS HIGH 50: CPT | Performed by: INTERNAL MEDICINE

## 2021-03-29 RX ORDER — VERAPAMIL HYDROCHLORIDE 80 MG/1
120 TABLET ORAL 2 TIMES DAILY
Status: DISCONTINUED | OUTPATIENT
Start: 2021-03-29 | End: 2021-04-02 | Stop reason: HOSPADM

## 2021-03-29 RX ORDER — PREDNISONE 10 MG/1
40 TABLET ORAL DAILY
Status: DISCONTINUED | OUTPATIENT
Start: 2021-03-30 | End: 2021-03-30

## 2021-03-29 RX ADMIN — HEPARIN SODIUM 5000 UNITS: 5000 INJECTION INTRAVENOUS; SUBCUTANEOUS at 05:44

## 2021-03-29 RX ADMIN — INSULIN GLARGINE 20 UNITS: 100 INJECTION, SOLUTION SUBCUTANEOUS at 21:30

## 2021-03-29 RX ADMIN — CARVEDILOL 25 MG: 25 TABLET, FILM COATED ORAL at 21:18

## 2021-03-29 RX ADMIN — BARIUM SULFATE 50 G: 0.81 POWDER, FOR SUSPENSION ORAL at 14:14

## 2021-03-29 RX ADMIN — CHLORHEXIDINE GLUCONATE 15 ML: 1.2 RINSE ORAL at 21:18

## 2021-03-29 RX ADMIN — NYSTATIN 500000 UNITS: 100000 SUSPENSION ORAL at 21:18

## 2021-03-29 RX ADMIN — HEPARIN SODIUM 5000 UNITS: 5000 INJECTION INTRAVENOUS; SUBCUTANEOUS at 13:07

## 2021-03-29 RX ADMIN — NITROGLYCERIN 1 INCH: 20 OINTMENT TOPICAL at 05:44

## 2021-03-29 RX ADMIN — VERAPAMIL HYDROCHLORIDE 120 MG: 80 TABLET, FILM COATED ORAL at 08:51

## 2021-03-29 RX ADMIN — IPRATROPIUM BROMIDE AND ALBUTEROL SULFATE 1 AMPULE: .5; 3 SOLUTION RESPIRATORY (INHALATION) at 07:18

## 2021-03-29 RX ADMIN — NYSTATIN 500000 UNITS: 100000 SUSPENSION ORAL at 18:35

## 2021-03-29 RX ADMIN — CARVEDILOL 25 MG: 25 TABLET, FILM COATED ORAL at 08:51

## 2021-03-29 RX ADMIN — MEROPENEM 1000 MG: 1 INJECTION, POWDER, FOR SOLUTION INTRAVENOUS at 21:18

## 2021-03-29 RX ADMIN — LIDOCAINE HYDROCHLORIDE 5 ML: 20 INJECTION, SOLUTION INFILTRATION; PERINEURAL at 10:26

## 2021-03-29 RX ADMIN — ASPIRIN 81 MG: 81 TABLET, COATED ORAL at 08:51

## 2021-03-29 RX ADMIN — SODIUM CHLORIDE, PRESERVATIVE FREE 10 ML: 5 INJECTION INTRAVENOUS at 21:19

## 2021-03-29 RX ADMIN — CLONIDINE HYDROCHLORIDE 0.2 MG: 0.1 TABLET ORAL at 08:51

## 2021-03-29 RX ADMIN — BUDESONIDE 500 MCG: 0.5 SUSPENSION RESPIRATORY (INHALATION) at 20:17

## 2021-03-29 RX ADMIN — SODIUM CHLORIDE 250 ML: 9 INJECTION, SOLUTION INTRAVENOUS at 10:27

## 2021-03-29 RX ADMIN — SODIUM CHLORIDE, PRESERVATIVE FREE 10 ML: 5 INJECTION INTRAVENOUS at 08:52

## 2021-03-29 RX ADMIN — NITROGLYCERIN 1 INCH: 20 OINTMENT TOPICAL at 18:35

## 2021-03-29 RX ADMIN — IPRATROPIUM BROMIDE AND ALBUTEROL SULFATE 1 AMPULE: .5; 3 SOLUTION RESPIRATORY (INHALATION) at 20:17

## 2021-03-29 RX ADMIN — PANTOPRAZOLE SODIUM 40 MG: 40 INJECTION, POWDER, FOR SOLUTION INTRAVENOUS at 08:52

## 2021-03-29 RX ADMIN — VERAPAMIL HYDROCHLORIDE 120 MG: 80 TABLET, FILM COATED ORAL at 21:25

## 2021-03-29 RX ADMIN — DOCUSATE SODIUM 100 MG: 50 LIQUID ORAL at 08:52

## 2021-03-29 RX ADMIN — METHYLPREDNISOLONE SODIUM SUCCINATE 40 MG: 40 INJECTION, POWDER, FOR SOLUTION INTRAMUSCULAR; INTRAVENOUS at 08:52

## 2021-03-29 RX ADMIN — CLONIDINE HYDROCHLORIDE 0.2 MG: 0.1 TABLET ORAL at 21:18

## 2021-03-29 RX ADMIN — Medication 10 ML: at 21:56

## 2021-03-29 RX ADMIN — MEROPENEM 1000 MG: 1 INJECTION, POWDER, FOR SOLUTION INTRAVENOUS at 08:52

## 2021-03-29 RX ADMIN — HEPARIN SODIUM 5000 UNITS: 5000 INJECTION INTRAVENOUS; SUBCUTANEOUS at 21:28

## 2021-03-29 RX ADMIN — BUDESONIDE 500 MCG: 0.5 SUSPENSION RESPIRATORY (INHALATION) at 07:18

## 2021-03-29 RX ADMIN — DOCUSATE SODIUM 100 MG: 50 LIQUID ORAL at 21:18

## 2021-03-29 RX ADMIN — BARIUM SULFATE 80 ML: 400 SUSPENSION ORAL at 14:14

## 2021-03-29 RX ADMIN — NITROGLYCERIN 1 INCH: 20 OINTMENT TOPICAL at 13:06

## 2021-03-29 RX ADMIN — NITROGLYCERIN 1 INCH: 20 OINTMENT TOPICAL at 00:00

## 2021-03-29 ASSESSMENT — ENCOUNTER SYMPTOMS
NAUSEA: 0
RESPIRATORY NEGATIVE: 1
TROUBLE SWALLOWING: 0
SHORTNESS OF BREATH: 0
GASTROINTESTINAL NEGATIVE: 1
STRIDOR: 0
CONSTIPATION: 0
VOICE CHANGE: 1
SORE THROAT: 1
TROUBLE SWALLOWING: 1
DIARRHEA: 0
BLOOD IN STOOL: 0
WHEEZING: 0
COUGH: 0
PHOTOPHOBIA: 0
ALLERGIC/IMMUNOLOGIC NEGATIVE: 1
EYES NEGATIVE: 1
VOMITING: 0
BACK PAIN: 0
CHEST TIGHTNESS: 0

## 2021-03-29 ASSESSMENT — PAIN SCALES - GENERAL
PAINLEVEL_OUTOF10: 0
PAINLEVEL_OUTOF10: 0

## 2021-03-29 NOTE — FLOWSHEET NOTE
Patient back from xray. Has no complaints at this time. Call light remains in reach.  Electronically signed by Kate Barron RN on 3/29/2021 at 3:06 PM

## 2021-03-29 NOTE — DISCHARGE INSTR - COC
Continuity of Care Form    Patient Name: Jose Thornton   :  1945  MRN:  [id number]    Admit date:  3/18/2021  Discharge date:  2021    Code Status Order: Limited   Advance Directives:   [facility] Documentation     Date/Time Healthcare Directive Type of Healthcare Directive Copy in [facility] [address] Agent's Name Healthcare Agent's Phone Number    [phone]  No, patient does not have an advance directive for healthcare treatment -- -- -- -- --          Admitting Physician:  Jagruti Coronado MD  PCP: César Mcintosh MD    Discharging Nurse: [facility] Unit/Room#: S244/O750-84  Discharging Unit Phone Number: [phone]    Emergency Contact:   Extended Emergency Contact Information  Primary Emergency Contact: Delano Villanueva  Address: [address] [place] [address] [address], [address] [place] [address] Phone: [phone]  Work Phone: [phone]  Mobile Phone: [phone]  Relation: Spouse  Secondary Emergency Contact: Ines Sandoval Phone: [phone]  Relation: Child    Past Surgical History:  Past Surgical History:   Procedure Laterality Date    BACK SURGERY  2017    lumbar disc    CARDIAC CATHETERIZATION  11/3/14     DR. MIRELES / karuna stents    COLONOSCOPY  2016    w/polypectomy     COLONOSCOPY N/A 2020    COLONOSCOPY WITH POLYPECTOMY performed by Naresh Khan MD at [facility] N/A 10/7/2019    EUA HYSTEROSCOPY DILATATION AND CURETTAGE performed by Deena Storm DO at [facility]. [address], COLON, DIAGNOSTIC      EYE SURGERY      [place] with IOL OU / [address]    umbilical hernia repair    [place] ESOPHAGOGASTRODUODENOSCOPY TRANSORAL DIAGNOSTIC N/A 3/24/2017    EGD ESOPHAGOGASTRODUODENOSCOPY performed by Justa Espitia MD at [place] [address] 2018    negative findings    [place] REVISE MEDIAN N/CARPAL TUNNEL SURG Left 6/5/2017    LEFT  CARPAL TUNNEL RELEASE performed by Nadir Persaud MD at [facility] JKDF,4+U/J,GWYSP N/A 2/8/2018    TONSILLECTOMY      as child    UPPER GASTROINTESTINAL ENDOSCOPY  08/26/2016    w/bx     UPPER GASTROINTESTINAL ENDOSCOPY N/A 2/25/2020    EGD possible biopsy performed by Naresh Khan MD at [address] N/A 7/1/2020    EGD PUSH ENTEROSCOPY performed by Skyler Mansfield MD at [facility]       Immunization History:   Immunization History   Administered Date(s) Administered    Influenza Vaccine, unspecified formulation 09/11/2015    Influenza, High Dose (Fluzone 65 yrs and older) 10/12/2016, 10/23/2017    Influenza, High-dose, Quadv, 65 yrs +, IM (Fluzone) 09/17/2020    Influenza, Quadv, IM, PF (6 mo and older Fluzone, Flulaval, Fluarix, and 3 yrs and older Afluria) 10/16/2019    Pneumococcal Conjugate 13-valent ([id number]) 10/23/2017    Pneumococcal Polysaccharide ([id number]) 11/01/2010, 09/17/2020    Td (Adult), 5 Lf Tetanus Toxoid, Pf (Tenivac, Decavac) 01/20/2011    Td, unspecified formulation 01/20/2011       Active Problems:  Patient Active Problem List   Diagnosis Code    Hypertension I10    Hyperlipidemia E78.5    Uncontrolled type 2 diabetes mellitus with complication, without long-term current use of insulin (HCC) E11.8, E11.65    Fibromyalgia M79.7    Anxiety F41.9    Smoker F17.200    Insomnia G47.00    DJD (degenerative joint disease), lumbar M47.816    Lumbosacral radiculopathy at S1 M54.17    DDD (degenerative disc disease), lumbar M51.36    Dysphonia R49.0    CTS (carpal tunnel syndrome) G56.00    High risk medication use-Norco - 12/20/17 OARRS PM&R, 02/20/18 OARRS PM&R, 03/07/18 OARRS PM&R, Urine Drug screen negative 02/06/17 PM&R--MED CONTRACT 2/6/17 Z79.899    SOB (shortness of breath) on exertion R06.02    Chest pain R07.9    Memory deficit R41.3    remission ([facility]) F32.5    Gastroesophageal reflux disease without esophagitis K21.9    Acute cystitis without hematuria N30.00    CKD (chronic kidney disease) stage 4, GFR 15-29 ml/min ([facility]) N18.4    Cardiopulmonary arrest ([facility]) I46.9    Gait abnormality R26.9    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 Coli ESBL urine 10.18.20. Electronically signed by Harleen Rosales RN on 10/22/20 at 6:10 AM EDT     E. Coli ESBL urine on 10/23/2019. Electronically signed by Manjinder Conn RN on 10/28/2019 at 8:48 AM     E. Coli ESBL urine on 9/27/2019.  Electronically signed by Manjinder Conn RN on 10/1/2019 at 8:53 AM     Resolved    COVID-19 Rule Out 06/30/20 06/30/20 06/30/20 COVID-19 (Ordered)   06/30/20 Rule-Out Test Resulted    COVID-19 Rule Out 06/29/20 06/29/20 06/29/20 COVID-19 (Ordered)   06/29/20 Rule-Out Test Resulted    C-diff Rule Out  12/13/19 12/13/19 Clostridium Difficile Toxin/Antigen (Ordered)   12/14/19 Rule-Out Test Resulted          Nurse Assessment:  Last Vital Signs: BP (!) 172/70   Pulse 73   Temp 98.4 °F (36.9 °C) (Oral)   Resp 18   Ht 4' 11\" (1.499 m)   Wt 151 lb 4.8 oz (68.6 kg)   LMP  (LMP Unknown)   SpO2 100%   BMI 30.56 kg/m²     Last documented pain score (0-10 scale): Pain Level: 0  Last Weight:   Wt Readings from Last 1 Encounters:   03/29/21 151 lb 4.8 oz (68.6 kg)     Mental Status:  oriented and alert    IV Access:  - None    Nursing Mobility/ADLs:  Walking   Assisted  Transfer  Assisted  Bathing  Assisted  Dressing  Assisted  Toileting  Dependent  Feeding  Independent  Med Admin  Independent  Med · Name:  · Address:  · Dialysis Schedule:  · Phone:  · Fax:    / signature:Electronically signed by MILAN Balderrama on 3/29/21 at 11:58 AM EDT      PHYSICIAN SECTION    Prognosis: {Prognosis:1032798901}    Condition at Discharge: Kiarra Bradford Patient Condition:[id number]}    Rehab Potential (if transferring to Rehab): {Prognosis:1777184977}    Recommended Labs or Other Treatments After Discharge: ***    Physician Certification: I certify the above information and transfer of Kassy Ware  is necessary for the continuing treatment of the diagnosis listed and that she requires {Admit to Appropriate Level of Care:20703} for {GREATER/LESS:275304763} 30 days.      Update Admission H&P: {CHP DME Changes in IFHYD:475753830}    PHYSICIAN SIGNATURE:  {Esignature:[id number]}

## 2021-03-29 NOTE — PROGRESS NOTES
[facility] MED SURG TREATMENT NOTE     Date: 3/29/2021  Patient Name: Zadie Najjar        MRN: [id number]  Account: [de-identified]   : 1945  (67 y.o.)  Room: [place] [address]    Chart Review:  Diagnosis:  The primary encounter diagnosis was Cardiopulmonary arrest [facility]). Diagnoses of Anemia, unspecified type and Bradycardia were also pertinent to this visit. Restrictions:    Restrictions/Precautions  Restrictions/Precautions: Contact Precautions, Fall Risk(ESBL in urine)  Required Braces or Orthoses?: No         Subjective:  Patient states:  \"Can we do exercises tomorrow? \"  Pain:  Start of tx:  Pre Treatment Pain Screening  Pain at present: 0  Scale Used: Numeric Score  Intervention List: Patient able to continue with treatment  Comments / Details: no signs or symptoms of pain    End of tx:  Pain Assessment  Patient Currently in Pain: Denies  Pain Assessment: 0-10  Pain Level: 0    Objective: Toilet Transfers  Toilet Transfer: Unable to assess  Toilet Transfers Comments: unable to formally assess this date due to safety concerns.   Tub Transfers  Tub Transfers: Not tested       NICOLE hose donned: No  If no, why:     Therapy key for assistance levels    Independent = Pt. is able to perform task with no assistance but may require a device   Stand by assistance = Pt. does not perform task at an independent level but does not need physical assistance, requires verbal cues  Minimal, Moderate, Maximal Assistance = Pt. requires physical assistance (25%, 50%, 75% assist from helper) for task but is able to actively participate in task   Dependent = Pt. requires total assistance with task and is not able to actively participate with task completion    Functional Balance:  Balance  Sitting Balance: Unable to assess(comment)  Standing Balance: Unable to assess(comment)        Cognition:  Cognition  Overall Cognitive Status: Exceptions  Arousal/Alertness: Appropriate responses to stimuli  Following Commands: Follows one step commands consistently, Follows one step commands with increased time  Attention Span: Appears intact  Memory: Appears intact  Safety Judgement: Decreased awareness of need for assistance  Problem Solving: Assistance required to identify errors made, Assistance required to generate solutions  Insights: Decreased awareness of deficits  Initiation: Requires cues for some  Sequencing: Requires cues for some  Cognition Comment: Comp: Min; Express: Mod; Soc Inter: Sup; Prob Solv: Min; Mem: Min    Bed Mobility  Bed mobility  Supine to Sit: Unable to assess  Sit to Supine: Unable to assess  Scooting: Dependent/Total  Comment: Pt repositioned in bed with Max A x 2 for proper positioning for prevention of skin breakdown. UE Exercises:  Type of ROM/Therapeutic Exercise  Type of ROM/Therapeutic Exercise: PROM  Comment: PROM completed to BUEs while pt supine in bed; B shoulder flexion to 90 degrees; Pt able to demonstrate increased AROM (~30 degrees shoulder flexion) when prompted;       Treatment consisted of:  Strengthening    Assessment/Discharge Disposition:  Assessment: Pt just returned from procedure upon therapist's arrival. Pt attempted BUE therapuetic exercise, however, pt declined any further treatment due to decreased activity endurance. Pt dependent with bed positioning this date. Pt willing to participate tomorrow. Pt continues to benefit from OT services to maximize pt independence and safety with ADLs and transfers.   Performance deficits / Impairments: Decreased functional mobility , Decreased safe awareness, Decreased balance, Decreased coordination, Decreased ADL status, Decreased posture, Decreased ROM, Decreased endurance, Decreased strength, Decreased fine motor control  Prognosis: Guarded  Discharge Recommendations: Continue to assess pending progress  History: required intensive chart review  Exam: 10 areas of deficit with possibility of adding additional as more assessment is able to be completed  Assistance / Modification: Max modification      6-Click  How much help for putting on and taking off regular lower body clothing?: Total  How much help for Bathing?: Total  How much help for Toileting?: Total  How much help for putting on and taking off regular upper body clothing?: Total  How much help for taking care of personal grooming?: Total  How much help for eating meals?: Total  AM-PAC Inpatient Daily Activity Raw Score: 6  AM-PAC Inpatient ADL T-Scale Score : 17.07  ADL Inpatient CMS 0-100% Score: 100    Plan:  Continue OT per POC    Goals/Plan:    Improve Balance  Improve Strength  Improve Functional Transfers  Improve ADL [place]    Minutes:    OT Individual Minutes  Time In: 1030  Time Out: 1042  Minutes: 12    Therapeutic activities: 12 minutes    Electronically signed by:    Arabella Sanchez  3/29/2021, 10:59 AM

## 2021-03-29 NOTE — PROGRESS NOTES
Nephrology Progress Note    Assessment:  Resolving ATN/SAÚL  Hx Cardiac arrest admission  DM rype-2-Hypertension-Hyperlipidemia        Plan: Will sign off case  Rehab possibly  Let us knoow of any change in GFR worsening  Maintain hydration    Patient Active Problem List:     Hypertension     Hyperlipidemia     Uncontrolled type 2 diabetes mellitus with complication, without long-term current use of insulin (HCC)     Fibromyalgia     Anxiety     Smoker     Insomnia     DJD (degenerative joint disease), lumbar     Lumbosacral radiculopathy at S1     DDD (degenerative disc disease), lumbar     Dysphonia     CTS (carpal tunnel syndrome)     High risk medication use-Norco - 12/20/17 O[facility] PM&R, 02/20/18 OAR[facility] PM&R, 03/07/18 O[facility] PM&R, Urine Drug screen negative 02/06/17 PM&R--MED CONTRACT 2/6/17     SOB (shortness of breath) on exertion     Chest pain     Memory deficit     Artificial lens present     Presbyopia     Astigmatism, regular     Cataract, nuclear sclerotic senile     IDDM (insulin dependent diabetes mellitus)     Regular astigmatism     Nuclear senile cataract     Neck pain     Lumbosacral radiculopathy at L5     Spinal stenosis of lumbar region with neurogenic claudication     Impaired mobility and activities of daily living     Non-compliant patient NO showed FU 1/10/17 Dr Natalie Garcia     Insomnia secondary to chronic pain     Reactive depression     Diabetic radiculopathy (HCC)     Cervical radicular pain     Diabetic asymmetric polyneuropathy (HCC)     Myalgia     Intercostal neuropathy     Osteoarthritis of spine with radiculopathy, lumbar region     Acute combined systolic and diastolic CHF, NYHA class 1 (HCC)     PMB (postmenopausal bleeding)     Acute on chronic diastolic heart failure (HCC)     CHF (congestive heart failure) ([address]     Hypertensive urgency     Uncontrolled type 2 diabetes mellitus with hyperglycemia ([address]     Chronic obstructive pulmonary disease with acute exacerbation ([address]     Acute on chronic diastolic (congestive) heart failure (HCC)     SAÚL (acute kidney injury) (HCC)     Hypoglycemia     HOCM (hypertrophic obstructive cardiomyopathy) (HCC)     Gastrointestinal hemorrhage     COPD exacerbation (HCC)     Anemia     AVM (arteriovenous malformation)     Symptomatic anemia     Flash pulmonary edema (HCC)     Acute on chronic kidney failure (HCC)     Dysarthria     Chronic fatigue     Acute encephalopathy     Adenomatous polyp of sigmoid colon     Adenomatous polyp of transverse colon     Sepsis (HCC)     Major depressive disorder in remission ([facility]     Gastroesophageal reflux disease without esophagitis     Acute cystitis without hematuria     CKD (chronic kidney disease) stage 4, GFR 15-29 ml/min (HCC)     Cardiopulmonary arrest ([facility]     Gait abnormality     Late effects of CVA (cerebrovascular accident)     Cardiac arrest ([facility] [address] [facility]     Acute respiratory failure with hypoxia ([facility])      Subjective:  Admit Date: 3/18/2021    Interval History: pleasant in no distress    Medications:  Scheduled Meds:   verapamil  120 mg Oral BID    aspirin  81 mg Oral Daily    ipratropium-albuterol  1 ampule Inhalation TID    insulin glargine  20 Units Subcutaneous Nightly    insulin lispro  0-12 Units Subcutaneous TID WC    insulin lispro  0-6 Units Subcutaneous Nightly    lidocaine  5 mL Intradermal Once    sodium chloride flush  10 mL Intravenous 2 times per day    carvedilol  25 mg Oral BID    cloNIDine  0.2 mg Oral BID    meropenem  1,000 mg Intravenous Q12H    nitroglycerin  1 inch Topical 4 times per day    methylPREDNISolone  40 mg Intravenous Daily    pantoprazole  40 mg Intravenous Daily    And    sodium chloride (PF)  10 mL Intravenous Daily    heparin (porcine)  5,000 Units Subcutaneous 3 times per day    docusate  100 mg Oral BID    polyethylene glycol  17 g Oral Daily    budesonide  0.5 mg Nebulization BID    chlorhexidine  15 mL Mouth/Throat BID    sodium chloride flush  10 mL Intravenous 2 times per day     Continuous Infusions:   dextrose 100 mL/hr at 03/28/21 1938    sodium chloride      dextrose      sodium chloride         CBC:   Recent Labs     03/28/21  0504 03/29/21  0513   WBC 9.2 11.5*   HGB 10.2* 9.1*   * 431*     CMP:    Recent Labs     03/27/21  0518 03/28/21  0504 03/29/21  0513   * 149* 138   K 5.1* 4.8 4.5   * 115* 106   CO2 19* 25 24   BUN 60* 53* 40*   CREATININE 1.48* 1.42* 1.30*   GLUCOSE 85 175* 249*   CALCIUM 9.6 10.2* 10.0*   LABGLOM 34.2* 35.9* 39.8*     Troponin: No results for input(s): TROPONINI in the last 72 hours. BNP: No results for input(s): BNP in the last 72 hours. INR:   Recent Labs     03/29/21 0513   INR 1.1     Lipids: No results for input(s): CHOL, LDLDIRECT, TRIG, HDL, AMYLASE, LIPASE in the last 72 hours. Liver:   Recent Labs     03/29/21 0513   AST 21   ALT 28   ALKPHOS 99   PROT 5.9*   LABALBU 2.9*   BILITOT <0.2     Iron:  No results for input(s): IRONS, FERRITIN in the last 72 hours. Invalid input(s): LABIRONS  Urinalysis: No results for input(s): UA in the last 72 hours.     Objective:  Vitals: BP (!) 172/70   Pulse 73   Temp 98.4 °F (36.9 °C) (Oral)   Resp 18   Ht 4' 11\" (1.499 m)   Wt 151 lb 4.8 oz (68.6 kg)   LMP  (LMP Unknown)   SpO2 100%   BMI 30.56 kg/m²    Wt Readings from Last 3 Encounters:   03/29/21 151 lb 4.8 oz (68.6 kg)   01/15/21 131 lb (59.4 kg)   11/27/20 120 lb (54.4 kg)      24HR INTAKE/OUTPUT:      Intake/Output Summary (Last 24 hours) at 3/29/2021 0816  Last data filed at 3/29/2021 0449  Gross per 24 hour   Intake 600 ml   Output 1650 ml   Net -1050 ml       General: alert, in no apparent distress mild connfusion  HEENT: normocephalic, atraumatic, anicteric  Neck: supple, no mass  Lungs: non-labored respirations, clear to auscultation bilaterally  Heart: regular rate and rhythm, no murmurs or rubs  Abdomen: soft, non-tender, non-distended  Overweight   Ext: no cyanosis, no peripheral edema  Neuro: alert  ? oriented, no gross abnormalities  Psych: normal mood and affect  Skin: no rash      Electronically signed by Leola Dixon MD

## 2021-03-29 NOTE — PROGRESS NOTES
INPATIENT PROGRESS NOTES    PATIENT NAME: Afshan Ndiaye  MRN: [id number]  SERVICE DATE:  2021   SERVICE TIME:  4:00 PM      PRIMARY SERVICE: Pulmonary Disease    CHIEF COMPLAINTS: Post cardiac arrest, respiratory failure    INTERVAL HPI: Patient seen and examined at bedside, Interval Notes, orders reviewed. Nursing notes noted    Patient report feeling better, shortness of breath improved, mainly exertional, no chest pain, minimal coughing, no fever, no nausea no vomiting, no abdominal pain. She is currently on 2 L O2 saturation 100%    Review of system:     GI Abdominal pain No  Skin Rash No    Social History     Tobacco Use    Smoking status: Former Smoker     Packs/day: 1.00     Years: 59.00     Pack years: 59.00     Types: Cigarettes     Start date: 2017    Smokeless tobacco: Never Used    Tobacco comment: quit 2-3 weeks ago    Substance Use Topics    Alcohol use: Not Currently         Problem Relation Age of Onset    Heart Disease Father         cardiac bypass    Arthritis Father     Arthritis Mother     Other Mother          at age 80    Other Sister         [facility]    No Known Problems Daughter     Stroke Son          OBJECTIVE    Body mass index is 30.56 kg/m². PHYSICAL EXAM:  Vitals:  BP (!) 141/63   Pulse 78   Temp 98.8 °F (37.1 °C) (Oral)   Resp 18   Ht 4' 11\" (1.499 m)   Wt 151 lb 4.8 oz (68.6 kg)   LMP  (LMP Unknown)   SpO2 100%   BMI 30.56 kg/m²     General: alert, cooperative, no distress  Head: normocephalic, atraumatic  Eyes:No gross abnormalities. ENT:  MMM no lesions  Neck:  supple, no masses and stridor present but improved  Chest : Bilateral wheezing, good air movement, bibasilar rales, nontender, tympanic  Heart[de-identified] Heart sounds are normal.  Regular rate and rhythm without murmur, gallop or rub.   ABD:  symmetric, soft, non-tender, no guarding or rebound  Musculoskeletal : no cyanosis, no clubbing and no edema  Neuro:  Grossly normal  Skin: No rashes or nodules noted. Lymph node:  no cervical nodes  Urology: No Sanchez   Psychiatric: appropriate    DATA:   Recent Labs     03/28/21  0504 03/29/21  0513   WBC 9.2 11.5*   HGB 10.2* 9.1*   HCT 31.0* 27.6*   MCV 90.4 89.3   * 431*     Recent Labs     03/28/21  0504 03/29/21  0513   * 138   K 4.8 4.5   * 106   CO2 25 24   BUN 53* 40*   CREATININE 1.42* 1.30*   GLUCOSE 175* 249*   CALCIUM 10.2* 10.0*   PROT 6.2* 5.9*   LABALBU 2.9* 2.9*   BILITOT <0.2 <0.2   ALKPHOS 97 99   AST 16 21   ALT 26 28   LABGLOM 35.9* 39.8*   GFRAA 43.4* 48.1*   GLOB 3.3 3.0       MV Settings:     Vent Mode: (S) CPAP  Vt Ordered: 350 mL  Rate Set: 16 bmp  FiO2 : 40 %  PEEP/CPAP: 6  Pressure Support: 5 cmH20  Peak Inspiratory Pressure: 14 cmH2O  Mean Airway Pressure: 9 cmH20  I:E Ratio: 1:1.60    No results for input(s): PHART, PEE0QUQ, PO2ART, EVB2SMC, BEART, U3XITOXD in the last 72 hours.     O2 Device: Nasal cannula  O2 Flow Rate (L/min): 2 L/min    Dietary Nutrition Supplements: Diabetic Oral Supplement  DIET CARB CONTROL; Carb Control: 3 carb choices (45 gms)/meal; Dysphagia Soft and Bite-Sized     MEDICATIONS during current hospitalization:    Continuous Infusions:   dextrose      sodium chloride         Scheduled Meds:   verapamil  120 mg Oral BID    nystatin  5 mL Oral 4x Daily    aspirin  81 mg Oral Daily    ipratropium-albuterol  1 ampule Inhalation TID    insulin glargine  20 Units Subcutaneous Nightly    insulin lispro  0-12 Units Subcutaneous TID WC    insulin lispro  0-6 Units Subcutaneous Nightly    sodium chloride flush  10 mL Intravenous 2 times per day    carvedilol  25 mg Oral BID    cloNIDine  0.2 mg Oral BID    meropenem  1,000 mg Intravenous Q12H    nitroglycerin  1 inch Topical 4 times per day    methylPREDNISolone  40 mg Intravenous Daily    pantoprazole  40 mg Intravenous Daily    And    sodium chloride (PF)  10 mL Intravenous Daily    heparin (porcine)  5,000 Units Subcutaneous 3 times per day    docusate  100 mg Oral BID    polyethylene glycol  17 g Oral Daily    budesonide  0.5 mg Nebulization BID    chlorhexidine  15 mL Mouth/Throat BID    sodium chloride flush  10 mL Intravenous 2 times per day       PRN Meds:albuterol, sodium chloride flush, glucose, dextrose, glucagon (rDNA), dextrose, sodium chloride, hydrALAZINE **OR** hydrALAZINE, labetalol, labetalol, promethazine **OR** ondansetron, sodium chloride flush, acetaminophen **OR** acetaminophen    Radiology  Echocardiogram Complete 2d With Doppler With Color    Result Date: 3/19/2021  Transthoracic Echocardiography Report (TTE)  Demographics  Patient Name   Severiano Bickers  Gender              Female                 M  Patient Number [id number]        Race                Black                                 Ethnicity  Visit Number   [id number]       Room Number         IC05  Corporate ID                   Date of Study       03/19/2021  Accession      [id number]      Referring Physician  Number  Date of Birth  1945      Sonographer         Ottoniel Galeano LPN  Age            67 year(s)      Interpreting        [facility]) Cardiology                                 Physician           Agatha Kamara MD Procedure Type of Study  TTE procedure:ECHO COMPLETE 2D W/DOP W/COLOR. Procedure Date Date: 03/19/2021 Start: 09:15 AM Study Location: Portable Technical Quality: Good visualization Indications:Cardiac arrest. Patient Status: Routine Weight: 143 pounds BP: 89/48 mmHg Allergies   - Other allergy:(Darvon). Conclusions  Summary  Mitral annular calcification is present. 1-2+ MR  Normal tricuspid valve structure and function. 1+ TR  RVSP 31 mmHg  severe CLVH  LVEF 50%  E/A flow reversal noted. Suggestive of diastolic dysfunction.   Signature  ----------------------------------------------------------------  Electronically signed by Agatha Kamara MD(Interpreting physician) on 03/19/2021 02:13 PM  ----------------------------------------------------------------  Findings Left Ventricle severe CLVH LVEF 50% E/A flow reversal noted. Suggestive of diastolic dysfunction. Right Ventricle Normal right ventricle structure and function. Normal right ventricle systolic pressure. Left Atrium Moderately dilated left atrium. Right Atrium Normal right atrium. Mitral Valve Mitral annular calcification is present. 1-2+ MR Tricuspid Valve Normal tricuspid valve structure and function. 1+ TR RVSP 31 mmHg Aortic Valve Normal aortic valve structure and function. Pulmonic Valve The pulmonic valve was not well visualized . Pericardial Effusion No evidence of pericardial effusion. Pleural Effusion No evidence of pleural effusion. Aorta \ Miscellaneous The aorta is within normal limits. M-Mode Measurements (cm)  LVIDd: 4.01 cm                         LVIDs: 1.85 cm  IVSd: 1.45 cm                          IVSs: 1.23 cm  LVPWd: 1.32 cm                         LVPWs: 1.61 cm  Rt. Vent.  Dimension: 1.07 cm           AO Root Dimension: 2.8 cm                                         ACS: 1.47 cm                                         LA: 3.04 cm                                         LVOT: 1.98 cm Doppler Measurements:  AV Velocity:0.01 m/s                   MV Peak E-Wave: 0.96 m/s  AV Peak Gradient: 7.73 mmHg            MV Peak A-Wave: 1.58 m/s  AV Mean Gradient: 3.01 mmHg  AV Area (Continuity):1.27 cm^2         MV P1/2t: 105.6 msec  TR Velocity:2.29 m/s                   MVA by PHT2.08 cm^2  TR Gradient:21.01 mmHg                 Estimated RAP:10 mmHg                                         RVSP:31 mmHg Valves  Mitral Valve  Peak E-Wave: 0.96 m/s          Peak A-Wave: 1.58 m/s  P1/2t: 105.6 msec              E/A Ratio: 0.61                                 Peak Gradient: 3.7 mmHg  Area (PHT): 2.08 cm^2          Deceleration Time: 349.6 msec  MR Velocity: 4.66 m/s  Aortic Valve  Peak Velocity: 1.39 m/s                Mean Velocity: 0.79 m/s  Peak Gradient: 7.73 mmHg               Mean Gradient: 3.01 mmHg  Area (continuity): 1.27 cm^2  AV VTI: 28.28 cm  Cusp Separation: 1.47 cm  Tricuspid Valve  Estimated RVSP: 31 mmHg              Estimated RAP: 10 mmHg  TR Velocity: 2.29 m/s                TR Gradient: 21.01 mmHg  Pulmonic Valve  Peak Velocity: 0.68 m/s           Peak Gradient: 1.86 mmHg                                    Estimated PASP: 31.01 mmHg  LVOT  Peak Velocity: 0.72 m/s              Mean Velocity: 0.41 m/s  Peak Gradient: 2 mmHg                Mean Gradient: 0.9 mmHg  LVOT Diameter: 1.98 cm               LVOT VTI: 11.64 cm Structures  Left Atrium  LA Dimension: 3.04 cm                 LA Area: 18.08 cm^2  LA/Aorta: 1.09  LA Volume/Index: 49.97 ml  Left Ventricle  Diastolic Dimension: 9.11 cm         Systolic Dimension: 8.00 cm  Septum Diastolic: 6.20 cm            Septum Systolic: 1.18 cm  PW Diastolic: 3.28 cm                PW Systolic: 5.07 cm                                       FS: 53.9 %  LV EDV/LV EDV Index: 70.29 ml        LV ESV/LV ESV Index: 10.47 ml  EF Calculated: 85.1 %  LVOT Diameter: 1.98 cm  Right Ventricle  Diastolic Dimension: 8.28 cm                                    RV Systolic Pressure: 16.20 mmHg Aorta/ Miscellaneous Aorta  Aortic Root: 2.8 cm  LVOT Diameter: 1.98 cm    Ct Head Wo Contrast    Result Date: 3/20/2021  CT Brain. Contrast medium:  without contrast.. History:  Cardiac arrest. Technical factors: CT imaging of the brain was obtained and formatted as 5 mm contiguous axial images. 2.5 mm contiguous axial images were obtained through the osseous structures. Sagittal and coronal reconstruction obtained during postprocessing. Comparison:  CT brain, October 9, 2020, October 7, 2020. Rhenda Nola Findings: Extra-axial spaces:  Normal. Intracranial hemorrhage:  None. Ventricular system: Ventricles mildly enlarged. Sulci mildly prominent.  Basal Cisterns:  Normal. Cerebral Parenchyma: Portable    Result Date: 3/20/2021  EXAMINATION: XR CHEST PORTABLE CLINICAL HISTORY: ENDOTRACHEAL TUBE PLACEMENT COMPARISONS: MARCH 19, 2021 FINDINGS: The tracheal tube identified within right mainstem bronchus. Osseous structures intact. Cardiopericardial silhouette normal. Previously visualized ill-defined area increased opacity within the right lung base resolved. Interval development of increased opacity left lower and left midlung. CHEST ENDOTRACHEAL TUBE IN RIGHT MAINSTEM BRONCHUS. RECOMMENDING RETRACTING ENDOTRACHEAL TUBE 7 CM. INTERVAL DEVELOPMENT OF LEFT MID AND LEFT LOWER LUNG ATELECTASIS. INTERVAL RESOLUTION OF RIGHT LOWER LUNG ATELECTASIS. ABOVE FINDINGS DISCUSSED WITH PATIENT'S NURSE IGNACIO, VIA TELEPHONE, IN THE ICU, AT [facility], [address] [place] [address], MARCH 20, 2021. Xr Chest Portable    Result Date: 3/20/2021  EXAMINATION: XR CHEST PORTABLE CLINICAL HISTORY: INTUBATION COMPARISONS: MARCH 4, 2021, 2298 HOURS FINDINGS: Endotracheal tube tip is withdrawn from right mainstem bronchus and now lies 8 mm superior to mojgan. Osseous structures intact. Cardiopericardial silhouette normal. Aorta calcified. Ill-defined area increased opacity left lung base again identified, slightly. Right lung clear. INTERVAL RETRACTION OF THE ENDOTRACHEAL TUBE IN THE TRACHEA. CONSIDER RETRACTING ENDOTRACHEAL TUBE 1 CM. LEFT LOWER LUNG ATELECTASIS. Xr Chest Portable    Result Date: 3/19/2021  EXAMINATION: XR CHEST PORTABLE CLINICAL HISTORY: INTUBATION COMPARISONS: OCTOBER 9, 2020 FINDINGS: Endotracheal tube has migrated 3 cm inferiorly, just lying cephalad to mojgan. Nasogastric tube courses beneath diaphragm. Osseous structures intact. Cardiopericardial silhouette normal. Aorta calcified. Ill-defined area increased opacity right  lung base. RIGHT LOWER LUNG SUBSEGMENTAL ATELECTASIS/PNEUMONIA.  INTERVAL INFERIOR MIGRATION ENDOTRACHEAL TUBE 3 CM WITH TIP NOW JUST PROXIMAL TO MOJGAN. MAY WISH TO RETRACT ENDOTRACHEAL TUBE 2 positioned appropriately. The sheath was removed. The catheter was shown to aspirate and infuse properly. The flange of the catheter was affixed to the arm using a PICC securement device. A spot image of the chest showed the tip of the PICC line to lie in the cavoatrial junction. The patient tolerated the procedure well and without complications. SUCCESSFUL PICC PLACEMENT WITHOUT IMMEDIATE COMPLICATIONS. 6.6 mGy of fluoroscopy was used, with 1 fluoroscopic still saved. No diagnostic images were obtained. Chest x-ray reviewed by me, small left-sided effusion.     IMPRESSION AND SUGGESTION:  Patient is at risk due to   · Acute hypoxic spelt failure, improving  · Volume overload, improving  · Stridor and bronchospasm, slowly improving  · ESBL UTI  · Left-sided pleural effusion, likely third spacing    Recommendations    · Repeat chest x-ray tomorrow  · Watch volume status, avoid overload  · Change to prednisone   · Antibiotics per ID  · Incentive spirometry and flutter valve  · BiPAP while asleep  · Will need sleep study as outpatient         Electronically signed by Magdalene Craft MD,  FCCP   on 3/29/2021 at 4:00 PM I will STOP taking the medications listed below when I get home from the hospital:    Aspir 81 oral delayed release tablet  -- 1 tab(s) by mouth once a day last dose on 2/28/17

## 2021-03-29 NOTE — FLOWSHEET NOTE
Patient back from [facility]. Double lumen PICC noted to right upper arm. Dressing remains clean, dry and intact. Denies any pain at this time. Call light remains in reach.  Electronically signed by Mukund Juan RN on 3/29/2021 at 3:04 PM

## 2021-03-29 NOTE — FLOWSHEET NOTE
Patient to [facility] for PICC placement.  Electronically signed by Marcin London RN on 3/29/2021 at 3:03 PM

## 2021-03-29 NOTE — CONSULTS
Palliative Care Consult Note  Patient: Wilman Wallace  Gender: female  YOB: 1945  Unit/Bed: Q406/O495-02  CodeStatus: Limited  Inpatient Treatment Team: Treatment Team: Attending Provider: Regla Jones MD; Consulting Physician: Kia Newman MD; Consulting Physician: Arleth Izaguirre DO; Consulting Physician: Kayla Waddell MD; Utilization Reviewer: Dolly Lofton RN; Consulting Physician: Sendy Arzola MD; Respiratory Therapist (Day): Ruthie Jackson RCP; : Bekah Mendez RN; : MILAN Hoang; Utilization Reviewer: Christian Main RN; Patient Care Tech: Shaye Hogan; Registered Nurse: Mukund Juan, AKASH; Nursing Student: Luis Miguel Barraza  Admit Date:  3/18/2021    Chief Complaint: fatigue    History of Presenting Illness:      Wilman Wallace is a 67 y.o. female on hospital day 6 with a history of  Cardiac arrest. PMH is significant for nonobstructive CAD, diastolic HF, HOCM, HTN, IDDM2, CKD3, anemia, prolonged hospitalization in 40/2052 complicated by cardiac arrest s/p ROSC,requiring transvenous pacing due to persistent bradycardia and hypotension despite pressor support, SAÚL, and septic shock. Patient seen and examined at bedside for goals of care discussion, code status discussion, family support, and symptom management. She presented to the ER via EMS after becoming unresponsive at home after complaining of feeling weak and tired and taking an extra torsemide. Upon arrival to the ER patient was bradycardic and hypotensive with agonal respirations. She was intubated in the ER. She was treated with dopamine and Levophed for her persistent bradycardia. She went into cardiac arrest. CPR was initiated and lasted for 16 minutes per report. Patient treated with one unit of PRBC for anemia and positive FOBT. S/P extubation on 3/26/21. She is now on the medical floor. She is scheduled to have MBS done today.  Patient and her  met with hospice yesterday to gather information. No decision has been made at this time for patient to be admitted to hospice care. Patient observed laying in bed. She is alert and oriented x3. Her speech is barely audible secondary to hoarseness. She appears to have oral candidiasis. Will start her on nystatin swish and swallow. Reports that she is just \"very tired\". Denies pain or discomfort. No GI or  complaints voiced. Tells me that she lives at home with her . Gives me permission to discuss her care with her . Review of Systems:       Review of Systems   Constitutional: Positive for activity change, appetite change and fatigue. Negative for unexpected weight change. HENT: Positive for trouble swallowing and voice change. Eyes: Negative. Respiratory: Negative for shortness of breath and wheezing. Cardiovascular: Negative for leg swelling. Gastrointestinal: Negative for constipation, diarrhea and nausea. Endocrine: Negative. Genitourinary: Negative. Musculoskeletal: Positive for gait problem. Skin: Negative for pallor. Allergic/Immunologic: Negative. Neurological: Positive for weakness. Negative for dizziness. Psychiatric/Behavioral: Negative for confusion. Physical Examination:       BP (!) 172/70   Pulse 75   Temp 98.4 °F (36.9 °C) (Oral)   Resp 18   Ht 4' 11\" (1.499 m)   Wt 151 lb 4.8 oz (68.6 kg)   LMP  (LMP Unknown)   SpO2 100%   BMI 30.56 kg/m²    Physical Exam  Constitutional:       General: She is not in acute distress. Appearance: She is well-developed. She is obese. She is ill-appearing. Interventions: Nasal cannula in place. HENT:      Head: Normocephalic. Nose: Congestion present. Eyes:      General: No scleral icterus. Right eye: No discharge. Left eye: No discharge. Neck:      Musculoskeletal: Neck supple. Cardiovascular:      Rate and Rhythm: Normal rate. Pulmonary:      Effort: No respiratory distress.       Breath Magdalene Craft MD        glucagon (rDNA) injection 1 mg  1 mg Intramuscular PRN Magdalene Craft MD        dextrose 5 % solution  100 mL/hr Intravenous PRN Magdalene Craft MD        meropenem (MERREM) 1,000 mg in sodium chloride 0.9 % 100 mL IVPB (mini-bag)  1,000 mg Intravenous Q12H La Miller MD   Stopped at 03/29/21 0925    0.9 % sodium chloride infusion   Intravenous PRN Ramy Valencia DO        nitroglycerin (NITRO-BID) 2 % ointment 1 inch  1 inch Topical 4 times per day Rajat Altman MD   1 inch at 03/29/21 1306    methylPREDNISolone sodium (SOLU-MEDROL) injection 40 mg  40 mg Intravenous Daily Magdalene Craft MD   40 mg at 03/29/21 0852    pantoprazole (PROTONIX) injection 40 mg  40 mg Intravenous Daily Magdalene Craft MD   40 mg at 03/29/21 5280    And    sodium chloride (PF) 0.9 % injection 10 mL  10 mL Intravenous Daily Magdalene Craft MD   10 mL at 03/29/21 0852    heparin (porcine) injection 5,000 Units  5,000 Units Subcutaneous 3 times per day Magdalene Craft MD   5,000 Units at 03/29/21 1307    docusate (COLACE) 50 MG/5ML liquid 100 mg  100 mg Oral BID Magdalene Craft MD   100 mg at 03/29/21 0852    polyethylene glycol (GLYCOLAX) packet 17 g  17 g Oral Daily Magdalene Craft MD   Stopped at 03/27/21 0803    budesonide (PULMICORT) nebulizer suspension 500 mcg  0.5 mg Nebulization BID Magdalene Craft MD   500 mcg at 03/29/21 0718    hydrALAZINE (APRESOLINE) injection 10 mg  10 mg Intravenous Q4H PRN Eual Distad Sedar, DO   10 mg at 03/26/21 2321    Or    hydrALAZINE (APRESOLINE) injection 20 mg  20 mg Intravenous Q4H PRN Eual Distad Sedar, DO   20 mg at 03/25/21 2051    labetalol (NORMODYNE;TRANDATE) injection 20 mg  20 mg Intravenous Q4H PRN Kelli León, DO   20 mg at 03/24/21 1227    labetalol (NORMODYNE;TRANDATE) injection 40 mg  40 mg Intravenous Q4H PRN Kelli León DO   40 mg at 03/27/21 1302    promethazine (PHENERGAN) tablet 12.5 mg  12.5 mg Oral Q6H PRN Bledar Neda Ortega MD        Or    ondansetron (ZOFRAN) injection 4 mg  4 mg Intravenous Q6H PRN Dany Gil MD        chlorhexidine (PERIDEX) 0.12 % solution 15 mL  15 mL Mouth/Throat BID Dany Gil MD   15 mL at 03/28/21 2150    sodium chloride flush 0.9 % injection 10 mL  10 mL Intravenous 2 times per day Dany Gil MD   10 mL at 03/28/21 2210    sodium chloride flush 0.9 % injection 10 mL  10 mL Intravenous PRN Dany Gil MD        acetaminophen (TYLENOL) tablet 650 mg  650 mg Oral Q6H PRN Dany Gil MD   650 mg at 03/25/21 2153    Or    acetaminophen (TYLENOL) suppository 650 mg  650 mg Rectal Q6H PRN Dany Gil MD   650 mg at 03/25/21 0153       History: PMHx:  Past Medical History:   Diagnosis Date    Anxiety     Asthma     dx 2019 / has smoked since age 14    CHF (congestive heart failure) (HCC)     Chronic back pain     Bilateral L5 S1 Radic on emg--surprisingly worse on the left than the right--pt's symptoms and her MRI show worse on the right    Chronic obstructive pulmonary disease with acute exacerbation ([facility] [address] 10/12/2019    Depression     Fibromyalgia     Hyperlipidemia     meds > 8 yrs    Hypertension     meds > 45 yrs    On home O2     2l per n/c at bedtime mostly,     Osteoarthritis     Type II diabetes mellitus, uncontrolled (HCC)     hx > 8 yrs    Unspecified sleep apnea        PSHx:  Past Surgical History:   Procedure Laterality Date    BACK SURGERY  2017    lumbar disc    CARDIAC CATHETERIZATION  11/3/14     DR. MIRELES / no stents    COLONOSCOPY  08/29/2016    w/polypectomy     COLONOSCOPY N/A 9/29/2020    COLONOSCOPY WITH POLYPECTOMY performed by Shawn Scales MD at [facility] N/A 10/7/2019    EUA HYSTEROSCOPY DILATATION AND CURETTAGE performed by Karlie Vega DO at [facility]. [address], COLON, DIAGNOSTIC      EYE SURGERY      [place] with IOL OU / [address]    umbilical hernia repair    MT ESOPHAGOGASTRODUODENOSCOPY TRANSORAL DIAGNOSTIC N/A 3/24/2017    EGD ESOPHAGOGASTRODUODENOSCOPY performed by Susannah Holman MD at [facility] N/A 2/8/2018    negative findings    MT REVISE MEDIAN N/CARPAL TUNNEL SURG Left 6/5/2017    LEFT  CARPAL TUNNEL RELEASE performed [facility]la Remy [facility] at [facility],2+Z/O,CEPBC N/A 2/8/2018    TONSILLECTOMY      as child    UPPER GASTROINTESTINAL ENDOSCOPY  08/26/2016    w/bx     UPPER GASTROINTESTINAL ENDOSCOPY N/A 2/25/2020    EGD possible biopsy performed by Juancarlos Rios MD at [address] [place] [address] N/A 7/1/2020    EGD PUSH ENTEROSCOPY performed by Hermann Gallardo MD at [facility] Hx:  Social History     Socioeconomic History    Marital status:      Spouse name: Moses Redd Number of children: 2    Years of education: 15    Highest education level: High school graduate   Occupational History    Occupation: Retired-   Social Needs    Financial resource strain: Not hard at all   [place] insecurity     Worry: Never true     Inability: Never true    Transportation needs     Medical: No     Non-medical: No   Tobacco Use    Smoking status: Former Smoker     Packs/day: 1.00     Years: 59.00     Pack years: 59.00     Types: Cigarettes     Start date: 11/30/2017    Smokeless tobacco: Never Used    Tobacco comment: quit 2-3 weeks ago    Substance and Sexual Activity    Alcohol use: Not Currently    Drug use: No    Sexual activity: Yes     Partners: Male   Lifestyle    Physical activity     Days per week: 0 days     Minutes per session: 0 min    Stress:  Only a little   Relationships    Social connections     Talks on phone: More than three times a week     Gets together: More than three times a week     Attends Buddhist service: 1 to 4 times per year     Active member of club or organization: No Attends meetings of clubs or organizations: Never     Relationship status: Living with partner    Intimate partner violence     Fear of current or ex partner: No     Emotionally abused: No     Physically abused: No     Forced sexual activity: No   Other Topics Concern    None   Social History Narrative    Grew up in [place]     Lives With:  [id number] Spouse    Type of Home: House    Home Layout: Two level, Performs ADL's on one level    Home Access: Stairs to enter with rails    Entrance Stairs - Number of Steps: 4    Bathroom Shower/Tub: Tub/Shower unit, Doors    Bathroom Equipment: Shower chair, Grab bars in [place]: Rolling walker, Cane, Oxygen(uses her O2 very seldom)    ADL Assistance: Needs assistance    Homemaking Assistance: Needs assistance    Homemaking Responsibilities: No(spouse performs)    Ambulation Assistance: Independent    Transfer Assistance: Independent    Active : No    Occupation: Retired    Type of occupation: worked in [employer]: patient enjoys [employer]       Family Hx:  Family History   Problem Relation Age of Onset    Heart Disease Father         cardiac bypass    Arthritis Father     Arthritis Mother     Other Mother          at age 80    Other Sister         [facility]    No Known Problems Daughter     Stroke Son        LABS: Reviewed     CBC:  Lab Results   Component Value Date    WBC 11.5 2021    RBC 3.08 2021    HGB 9.1 2021    HCT 27.6 2021    MCV 89.3 2021    MCH 29.4 2021    MCHC 32.9 2021    RDW 16.6 2021     2021    MPV 8.8 2015     CBC with Differential:   Lab Results   Component Value Date    WBC 11.5 2021    RBC 3.08 2021    HGB 9.1 2021    HCT 27.6 2021     2021    MCV 89.3 2021    MCH 29.4 2021    MCHC 32.9 2021    RDW 16.6 2021    NRBC 1 2020    LYMPHOPCT 11.6 2021    MONOPCT Race                Black                                 Ethnicity  Visit Number   [id number]       Room Number         IC05  Corporate ID                   Date of Study       03/19/2021  Accession      [id number]      Referring Physician  Number  Date of Birth  1945      Sonographer         Sravani MaeRickMICHELLE  Age            67 year(s)      [address] [facility]                                 Physician           Kai Sargent MD Procedure Type of Study  TTE procedure:ECHO COMPLETE 2D W/DOP W/COLOR. Procedure Date Date: 03/19/2021 Start: 09:15 AM Study Location: Portable Technical Quality: Good visualization Indications:Cardiac arrest. Patient Status: Routine Weight: 143 pounds BP: 89/48 mmHg Allergies   - Other allergy:(Darvon). Conclusions  Summary  Mitral annular calcification is present. 1-2+ MR  Normal tricuspid valve structure and function. 1+ TR  RVSP 31 mmHg  severe CLVH  LVEF 50%  E/A flow reversal noted. Suggestive of diastolic dysfunction. Signature  ----------------------------------------------------------------  Electronically signed by Kai Sargent MD(Interpreting  physician) on 03/19/2021 02:13 PM  ----------------------------------------------------------------  Findings Left Ventricle severe CLVH LVEF 50% E/A flow reversal noted. Suggestive of diastolic dysfunction. Right Ventricle Normal right ventricle structure and function. Normal right ventricle systolic pressure. Left Atrium Moderately dilated left atrium. Right Atrium Normal right atrium. Mitral Valve Mitral annular calcification is present. 1-2+ MR Tricuspid Valve Normal tricuspid valve structure and function. 1+ TR RVSP 31 mmHg Aortic Valve Normal aortic valve structure and function. Pulmonic Valve The pulmonic valve was not well visualized . Pericardial Effusion No evidence of pericardial effusion.  Pleural Effusion No evidence of pleural effusion. Aorta \ Miscellaneous The aorta is within normal limits. M-Mode Measurements (cm)  LVIDd: 4.01 cm                         LVIDs: 1.85 cm  IVSd: 1.45 cm                          IVSs: 1.23 cm  LVPWd: 1.32 cm                         LVPWs: 1.61 cm  Rt. Vent.  Dimension: 1.07 cm           AO Root Dimension: 2.8 cm                                         ACS: 1.47 cm                                         LA: 3.04 cm                                         LVOT: 1.98 cm Doppler Measurements:  AV Velocity:0.01 m/s                   MV Peak E-Wave: 0.96 m/s  AV Peak Gradient: 7.73 mmHg            MV Peak A-Wave: 1.58 m/s  AV Mean Gradient: 3.01 mmHg  AV Area (Continuity):1.27 cm^2         MV P1/2t: 105.6 msec  TR Velocity:2.29 m/s                   MVA by PHT2.08 cm^2  TR Gradient:21.01 mmHg                 Estimated RAP:10 mmHg                                         RVSP:31 mmHg Valves  Mitral Valve  Peak E-Wave: 0.96 m/s          Peak A-Wave: 1.58 m/s  P1/2t: 105.6 msec              E/A Ratio: 0.61                                 Peak Gradient: 3.7 mmHg  Area (PHT): 2.08 cm^2          Deceleration Time: 349.6 msec  MR Velocity: 4.66 m/s  Aortic Valve  Peak Velocity: 1.39 m/s                Mean Velocity: 0.79 m/s  Peak Gradient: 7.73 mmHg               Mean Gradient: 3.01 mmHg  Area (continuity): 1.27 cm^2  AV VTI: 28.28 cm  Cusp Separation: 1.47 cm  Tricuspid Valve  Estimated RVSP: 31 mmHg              Estimated RAP: 10 mmHg  TR Velocity: 2.29 m/s                TR Gradient: 21.01 mmHg  Pulmonic Valve  Peak Velocity: 0.68 m/s           Peak Gradient: 1.86 mmHg                                    Estimated PASP: 31.01 mmHg  LVOT  Peak Velocity: 0.72 m/s              Mean Velocity: 0.41 m/s  Peak Gradient: 2 mmHg                Mean Gradient: 0.9 mmHg  LVOT Diameter: 1.98 cm               LVOT VTI: 11.64 cm Structures  Left Atrium  LA Dimension: 3.04 cm                 LA Area: 18.08 cm^2  LA/Aorta: 1.09  LA Volume/Index: 49.97 ml  Left Ventricle  Diastolic Dimension: 0.38 cm         Systolic Dimension: 0.86 cm  Septum Diastolic: 2.05 cm            Septum Systolic: 3.40 cm  PW Diastolic: 5.61 cm                PW Systolic: 3.25 cm                                       FS: 53.9 %  LV EDV/LV EDV Index: 70.29 ml        LV ESV/LV ESV Index: 10.47 ml  EF Calculated: 85.1 %  LVOT Diameter: 1.98 cm  Right Ventricle  Diastolic Dimension: 7.42 cm                                    RV Systolic Pressure: 48.59 mmHg Aorta/ Miscellaneous Aorta  Aortic Root: 2.8 cm  LVOT Diameter: 1.98 cm     Xr Chest Portable     Result Date: 3/19/2021  EXAMINATION: XR CHEST PORTABLE CLINICAL HISTORY: INTUBATION COMPARISONS: OCTOBER 9, 2020 FINDINGS: Endotracheal tube has migrated 3 cm inferiorly, just lying cephalad to mojgan. Nasogastric tube courses beneath diaphragm. Osseous structures intact. Cardiopericardial silhouette normal. Aorta calcified. Ill-defined area increased opacity right  lung base.      RIGHT LOWER LUNG SUBSEGMENTAL ATELECTASIS/PNEUMONIA. INTERVAL INFERIOR MIGRATION ENDOTRACHEAL TUBE 3 CM WITH TIP NOW JUST PROXIMAL TO MOJGAN. MAY WISH TO RETRACT ENDOTRACHEAL TUBE 2 CM.    Xr Chest Portable     Result Date: 3/19/2021  EXAMINATION: XR CHEST PORTABLE CLINICAL HISTORY: RESPIRATORY FAILURE COMPARISONS: MARCH 16, 2021 FINDINGS: Endotracheal tube tip 2.3 cm superior to mojgan. Nasogastric tube courses beneath diaphragm area osseous structures intact. Ill-defined areas increase opacity at lung bases. Cardiopericardial silhouette normal.      BIBASILAR SUBSEGMENTAL ATELECTASIS/PNEUMONIA IN THIS PARTIALLY EXPIRATORY CHEST RADIOGRAPH. Assessment and plan:      -Advance Care Planning  Discussed goals of care with patient. Explained in extensive detail nuances between full code, DNR CCA and DNR CC.  Patient has made the decision to be Limited code previously      -Goals of Care Discussion:  Disease process and goals of treatment were discussed in basic terms. We discussed the palliative care philosophy in light of those goals. We discussed all care options contingent on treatment response and QOL. Much active listening, presence, and emotional support were given. -Aimee's goal is to optimize available comfort care measures to decrease hospitalization and prevent ER visits when possible, manage symptomology with future Palliative Care service. Palliative Care Encounter  -Met with patient for [address], ACP, and initial discussion on pall care philosophy.   - Dg Kent is 67years old with multiple comorbidities which includes nonobstructive CAD, diastolic HF, HOCM, HTN, IDDM2, CKD3, anemia, prolonged hospitalization in  complicated by cardiac arrest s/p ROSC,requiring transvenous pacing due to persistent bradycardia and hypotension despite pressor support, SAÚL, and septic shock.  -Considering her comorbidities and her risk for re-hospitalization, Dg Kent is appropriate for Palliative Care Services.  -Call for any questions, concerns or change in condition. Much support, guidance and active listening provided.  -Prognostication cannot be determined at this time. Appropriate prognostication should be available once patient has completed rehab and reach recovery. Patient is currently being treated for multiple medical conditions includin. Cardiac arrest s/p ROSC  2. Acute hypoxic respiratory failure  3. Lactic acidosis  4. SAÚL/CKD3  5. Oral candidiasis    Palliative care will continue to follow as outpatient. Thank you for the opportunity to participate in Aimee's care.     Electronically signed by JOHNATHON Coon CNP on 3/29/2021 at 2:04 PM

## 2021-03-29 NOTE — CARE COORDINATION
The LSW called and talked to the patient's , Fran Christopher. The patient's  states he is still reviewing the information from [facility], [facility]. The patient's  would like a referral called to [facility] if a SNF is the discharge plan. The LSW notified Fernando Novak, of the referral for [facility]. Electronically signed by Candance Bramble on 3/29/21 at 12:56 PM EDT    Per Fernando Novak, [facility] has accepted the patient. Per the hospitalist, the precert may be started for SNF. [facility] is starting the [facility].  Electronically signed by Candance Bramble on 3/29/21 at 3:35 PM EDT

## 2021-03-29 NOTE — PROGRESS NOTES
Physical Therapy Missed Treatment   Facility/Department: [facility] MED SURG D169/B781-37    NAME: Chyna Coronado    : 1945 (67 y.o.)  MRN: [id number]    Account: [de-identified]  Gender: female    Chart reviewed, attempted PT at 11:13. Patient unavailable 2° to:    [] Hold per nsg request    [x] Pt declined d/t being too tired. Encouragement provided to participate and educated on the importance of mobility. \"I just can't. I'm too tired. \"    15:40 - Attempted treatment again but pt still reporting fatigue. [x] Nsg notified   [x] Other notified - C3    [] Pt. . off floor for test/procedure. [] Pt. Unavailable        Will attempt PT treatment again at earliest convenience.       Electronically signed by Bárbara Melchor PTA on 3/29/21 at 11:22 AM EDT

## 2021-03-29 NOTE — FLOWSHEET NOTE
AM assessment completed. Patient resting in bed at this time. Denies any chest pain. Remains NSR on the monitor. +1 non-pitting edema noted to bilateral upper/lower extremities. Pedal pulses palpable. Shortness of breath noted with exertion. Lungs are diminished with expiratory wheezes. SATS 100% on 2L NC. She has an occasional moist cough but she swallows the sputum. She is A/Ox3 and will assist with turning in the bed. Denies any pain with elimination. Last BM 3/28/21. Sanchez catheter to gravity drainage with light yellow urine, sediment noted. Skin remains warm, dry and intact. #22g SL to left forearm, flushed and patent. #22g SL to right upper forearm, flushed and patent. #22g SL to left hand/wrist area, flushed and patent. Vital signs stable and AM medications provided. Call light remains in reach.  Electronically signed by Ching Elizabeth RN on 3/29/2021 at 3:02 PM

## 2021-03-29 NOTE — PROGRESS NOTES
Hospitalist Progress Note      PCP: Kandis Schroeder MD    Date of Admission: 3/18/2021    Chief Complaint:  No acute events, afebrile, stable HD, on 2 liters of NC, transferred out of ICU yesterday     Medications:  Reviewed    Infusion Medications    sodium chloride      dextrose      sodium chloride       Scheduled Medications    verapamil  120 mg Oral BID    aspirin  81 mg Oral Daily    ipratropium-albuterol  1 ampule Inhalation TID    insulin glargine  20 Units Subcutaneous Nightly    insulin lispro  0-12 Units Subcutaneous TID WC    insulin lispro  0-6 Units Subcutaneous Nightly    lidocaine  5 mL Intradermal Once    sodium chloride flush  10 mL Intravenous 2 times per day    carvedilol  25 mg Oral BID    cloNIDine  0.2 mg Oral BID    meropenem  1,000 mg Intravenous Q12H    nitroglycerin  1 inch Topical 4 times per day    methylPREDNISolone  40 mg Intravenous Daily    pantoprazole  40 mg Intravenous Daily    And    sodium chloride (PF)  10 mL Intravenous Daily    heparin (porcine)  5,000 Units Subcutaneous 3 times per day    docusate  100 mg Oral BID    polyethylene glycol  17 g Oral Daily    budesonide  0.5 mg Nebulization BID    chlorhexidine  15 mL Mouth/Throat BID    sodium chloride flush  10 mL Intravenous 2 times per day     PRN Meds: albuterol, sodium chloride flush, glucose, dextrose, glucagon (rDNA), dextrose, sodium chloride, hydrALAZINE **OR** hydrALAZINE, labetalol, labetalol, promethazine **OR** ondansetron, sodium chloride flush, acetaminophen **OR** acetaminophen      Intake/Output Summary (Last 24 hours) at 3/29/2021 1025  Last data filed at 3/29/2021 0449  Gross per 24 hour   Intake 600 ml   Output 1650 ml   Net -1050 ml       Exam:    BP (!) 172/70   Pulse 73   Temp 98.4 °F (36.9 °C) (Oral)   Resp 18   Ht 4' 11\" (1.499 m)   Wt 151 lb 4.8 oz (68.6 kg)   LMP  (LMP Unknown)   SpO2 100%   BMI 30.56 kg/m²     General appearance: awake, alert, cooperative  Lungs: coarse BS bilaterally  Heart: S1S2,RRR  Abdomen: soft, BS active  Extremities: no edema    Labs:   Recent Labs     03/27/21  0518 03/28/21  0504 03/29/21  0513   WBC 16.2* 9.2 11.5*   HGB 10.9* 10.2* 9.1*   HCT 33.5* 31.0* 27.6*   * 460* 431*     Recent Labs     03/27/21  0518 03/28/21  0504 03/29/21  0513   * 149* 138   K 5.1* 4.8 4.5   * 115* 106   CO2 19* 25 24   BUN 60* 53* 40*   CREATININE 1.48* 1.42* 1.30*   CALCIUM 9.6 10.2* 10.0*   PHOS 3.8 3.7 2.6     Recent Labs     03/27/21  0518 03/28/21  0504 03/29/21  0513   AST 26 16 21   ALT 31 26 28   BILITOT <0.2 <0.2 <0.2   ALKPHOS 100 97 99     Recent Labs     03/27/21  1338 03/28/21  0504 03/29/21  0513   INR 1.0 1.1 1.1     No results for input(s): Cherre Gash in the last 72 hours. Urinalysis:      Lab Results   Component Value Date    NITRU Negative 10/18/2020    WBCUA 6-9 10/18/2020    BACTERIA Negative 10/18/2020    RBCUA 3-5 10/18/2020    BLOODU SMALL 10/18/2020    SPECGRAV 1.009 10/18/2020    GLUCOSEU Negative 10/18/2020       Radiology:  XR CHEST PORTABLE   Final Result   LEFT PLEURAL EFFUSION. BILATERAL LOWER LUNG ATELECTASIS/PNEUMONIA. XR CHEST PORTABLE   Final Result   No radiographic evidence of acute intrathoracic process. There is an NG tube coursing into the stomach. There is a left pleural effusion and likely bibasilar atelectasis or infiltrate. CT HEAD WO CONTRAST   Final Result   Impression:      Mild cerebral atrophy. Chronic ischemic white matter disease. Remote left thalamic lacunar infarct. Bilateral ethmoid and sphenoid sinusitis. All CT scans at this facility use dose modulation, iterative reconstruction, and/or weight based dosing when appropriate to reduce radiation dose to as low as reasonably achievable. XR CHEST PORTABLE   Final Result   INTERVAL RETRACTION OF THE ENDOTRACHEAL TUBE IN THE TRACHEA. CONSIDER RETRACTING ENDOTRACHEAL TUBE 1 CM.  LEFT LOWER LUNG ATELECTASIS. XR CHEST PORTABLE   Final Result      CHEST ENDOTRACHEAL TUBE IN RIGHT MAINSTEM BRONCHUS. RECOMMENDING RETRACTING ENDOTRACHEAL TUBE 7 CM. INTERVAL DEVELOPMENT OF LEFT MID AND LEFT LOWER LUNG ATELECTASIS. INTERVAL RESOLUTION OF RIGHT LOWER LUNG ATELECTASIS. ABOVE FINDINGS DISCUSSED WITH PATIENT'S NURSE IGNACIO, VIA TELEPHONE, IN THE ICU, AT [facility], [address] [place] [address], MARCH 20, 2021. XR CHEST PORTABLE   Final Result   BIBASILAR SUBSEGMENTAL ATELECTASIS/PNEUMONIA IN THIS PARTIALLY EXPIRATORY CHEST RADIOGRAPH. XR CHEST PORTABLE   Final Result   RIGHT LOWER LUNG SUBSEGMENTAL ATELECTASIS/PNEUMONIA. INTERVAL INFERIOR MIGRATION ENDOTRACHEAL TUBE 3 CM WITH TIP NOW JUST PROXIMAL TO FAISAL. MAY WISH TO RETRACT ENDOTRACHEAL TUBE 2 CM.       IR PICC WO SQ PORT/PUMP > 5 YEARS    (Results Pending)   FL MODIFIED BARIUM SWALLOW W VIDEO    (Results Pending)           Assessment/Plan:    67 y.o. female with PMH of nonobstructive CAD, diastolic HF, HOCM, HTN, IDDM2, CKD3, anemia, prolonged hospitalization in 39/0889 complicated by cardiac arrest s/p ROSC, SAÚL,septic shock who presented with:     Cardiac arrest s/p ROSC   - required Dopamine, norepinephrine and epinephrine infusion  - off pressors and hypothermia protocol  - TTE showed LVEF of 50%, severe LVH  - hypertensive, on Verapamil, Coreg, clonidine  - cardiology following     Acute hypoxic respiratory failure  - requiring intubation and mechanical ventilation  - extubated on 3/22, but had to be re-intubated same day due to stridor and respiratory distress  - extubated on 3/26 to BIPAP, no re-intubation per family  - on 2 liters of NC  - palliative care consulted  - on IV Solumedrol  - management per pulmonology/critical care service    Acute encephalopathy  - likely hypoxic-ischemic etiology from cardiac arrest  - CT head was negative per report  - improving  - neurology following     Acute / chronic anemia   - with Hb of 5.8 on arrival, FOBT was positive  - improved s/p PRBCs given in ED  - follow H/H      Lactic acidosis  - in the setting of cardiac arrest  - improved with volume repletion     Hyperkalemia  - resolved     SAÚL/CKD3  - improved  - nephrology following     IDDM2 with hyperglycemia  - improved, off insulin drip,   - on Lantus, ISS    ESBL E.Coli UTI / aspiration pneumonia  - on IV meropenem x 1 more week per ID     Leukocytosis   - improved    Dysphagia   - MBS per speech therapy    Code status - [facility],  met with hospice, not ready to make a decision for now            Electronically signed by Jose Manuel Gage MD on 3/29/2021 at 10:25 AM

## 2021-03-29 NOTE — PROCEDURES
[facility]   Facility/Department: [facility]  Speech Language Pathology  Modified Barium Swallow (MBS)/Videofluoroscopic Study of Swallowing    Moira Fox  : 1945 (67 y.o.)   MRN: [id number]  ROOM: [place] [address]  ADMISSION DATE: 3/18/2021  PATIENT DIAGNOSIS(ES): Cardiac arrest [facility]) [I46.9]  Chief Complaint   Patient presents with    Respiratory Distress     Patient Active Problem List    Diagnosis Date Noted    Cardiopulmonary arrest [facility])      Priority: High    Lumbosacral radiculopathy at L5 2015     Priority: High     Class: Acute    Spinal stenosis of lumbar region with neurogenic claudication 2015     Priority: High     Class: Chronic    High risk medication use-Norco - 17 OARRS PM&R, 18 OARRS PM&R, 18 OARRS PM&R, Urine Drug screen negative 17 PM&R--MED CONTRACT 2014     Priority: High    Acute respiratory failure with hypoxia (HCC)     Cardiac arrest ([address] 2021    Gait abnormality 10/14/2020    Late effects of CVA (cerebrovascular accident) 10/14/2020    Sepsis ([address] 10/06/2020    Major depressive disorder in remission ([address] 10/06/2020    Gastroesophageal reflux disease without esophagitis 10/06/2020    Acute cystitis without hematuria 10/06/2020    CKD (chronic kidney disease) stage 4, GFR 15-29 ml/min ([address] 10/06/2020    Adenomatous polyp of sigmoid colon     Adenomatous polyp of transverse colon     Acute encephalopathy     Flash pulmonary edema ([address] 2020    Acute on chronic kidney failure ([address] 2020    Dysarthria     Chronic fatigue     Symptomatic anemia 2020    COPD exacerbation ([address] 2020    Anemia     AVM (arteriovenous malformation)     Gastrointestinal hemorrhage 2020    HOCM (hypertrophic obstructive cardiomyopathy) ([address] 2019    Hypoglycemia     SAÚL (acute kidney injury) ([address] 10/23/2019    Acute on chronic diastolic (congestive) heart failure ([address] 10/13/2019    Chronic obstructive pulmonary disease with acute exacerbation [facility] 10/12/2019    Hypertensive urgency 10/09/2019    Uncontrolled type 2 diabetes mellitus with hyperglycemia ([facility])     Acute on chronic diastolic heart failure ([address] 10/08/2019    CHF (congestive heart failure) ([facility])     PMB (postmenopausal bleeding)     Acute combined systolic and diastolic CHF, NYHA class 1 ([facility] 2019    Osteoarthritis of spine with radiculopathy, lumbar region 2018    Myalgia 2018    Intercostal neuropathy 2018    Diabetic asymmetric polyneuropathy ([address] 2017    Cervical radicular pain 2016    Reactive depression 07/15/2016    Diabetic radiculopathy ([address] 07/15/2016    Insomnia secondary to chronic pain 04/15/2016    Non-compliant patient NO showed FU 1/10/17 Dr Serrano  2015    Impaired mobility and activities of daily living 2015    Neck pain 2015    Artificial lens present 10/03/2014    Presbyopia 10/03/2014    Astigmatism, regular 10/03/2014    Cataract, nuclear sclerotic senile 10/03/2014    IDDM (insulin dependent diabetes mellitus) 10/03/2014    Regular astigmatism 10/03/2014    Nuclear senile cataract 10/03/2014    Memory deficit 2014    SOB (shortness of breath) on exertion 2014    Chest pain 2014    CTS (carpal tunnel syndrome) 2014    DJD (degenerative joint disease), lumbar 2014    Lumbosacral radiculopathy at S1 2014    DDD (degenerative disc disease), lumbar 2014    Dysphonia 2014    Insomnia 2013    Smoker 2013    Hypertension     Hyperlipidemia     Uncontrolled type 2 diabetes mellitus with complication, without long-term current use of insulin ([facility])     Fibromyalgia     Anxiety      Past Medical History:   Diagnosis Date    Anxiety     Asthma     dx 2019 / has smoked since age 11    CHF (congestive heart failure) ([facility])     Chronic back pain Bilateral L5 S1 Radic on emg--surprisingly worse on the left than the right--pt's symptoms and her MRI show worse on the right    Chronic obstructive pulmonary disease with acute exacerbation (HCC) 10/12/2019    Depression     Fibromyalgia     Hyperlipidemia     meds > 8 yrs    Hypertension     meds > 45 yrs    On home O2     2l per n/c at bedtime mostly,     Osteoarthritis     Type II diabetes mellitus, uncontrolled ([address]     hx > 8 yrs    Unspecified sleep apnea      Past Surgical History:   Procedure Laterality Date    BACK SURGERY  2017    lumbar disc    CARDIAC CATHETERIZATION  11/3/14     DR. MIRELES / no stents    COLONOSCOPY  08/29/2016    w/polypectomy     COLONOSCOPY N/A 9/29/2020    COLONOSCOPY WITH POLYPECTOMY performed by Stalin Morris MD [facility] N/A 10/7/2019    EUA HYSTEROSCOPY DILATATION AND CURETTAGE performed by Trini Denise DO at [facility]. [address], COLON, DIAGNOSTIC      EYE SURGERY      [place] with IOL OU / [address] [place]  [address]    umbilical hernia repair    NH ESOPHAGOGASTRODUODENOSCOPY TRANSORAL DIAGNOSTIC N/A 3/24/2017    EGD ESOPHAGOGASTRODUODENOSCOPY performed by Eddie Foreman MD at [place] [address] 2/8/2018    negative findings    NH REVISE MEDIAN N/CARPAL TUNNEL SURG Left 6/5/2017    LEFT  CARPAL TUNNEL RELEASE performed by Elizabeth Castro MD at [place] [address]W/DEONTE CRYSTAL N/A 2/8/2018    TONSILLECTOMY      as child    UPPER GASTROINTESTINAL ENDOSCOPY  08/26/2016    w/bx     UPPER GASTROINTESTINAL ENDOSCOPY N/A 2/25/2020    EGD possible biopsy performed by Stalin Morris MD at [address] [place] [address] 7/1/2020    EGD PUSH ENTEROSCOPY performed by Annie Zhou MD at [facility]     Allergies   Allergen Reactions    Ibuprofen Nausea Only    Metformin And Related      Diarrhea      Darvon [Propoxyphene Hcl] Nausea And Vomiting       Date of Onset: 3/18/2021      Date of Evaluation: 3/29/2021   Evaluating Therapist: Cristobal Pelaez, SLP      SUMMARY OF EVALUATION  DYSPHAGIA DIAGNOSIS:  Mild oropharyngeal dysphagia    DIET RECOMMENDATIONS: Soft and bite size diet with thin liquids      FEEDING RECOMMENDATIONS:   Feed in upright position  Small bites/sips      THERAPY RECOMMENDATIONS:   Therapy is recommended to:  Assess tolerance of recommended diet      Nursing notified: Mariana Roberts RN      OBJECTIVE ASSESSMENT    Oral mechanism examination:  Adequate labial/lingual strength    Dentition:  Missing many teeth    Current respiratory status:      Oxygen via nasal cannula    Baseline Vocal Quality:  Aphonic  Dysphonic    Cognitive status:   Alert  Observed to be [facility] for study  Cognitive deficits      Diet Level Prior to this Evaluation:    Minced and moist diet with mildly thick liquids        PROCEDURE   Patient Positioning: Seated 70-90°  Viewing Plane(s): LATERAL ONLY  Contrast: commercially prepared, non-standardized barium viscosities; ranging from thin liquid to pudding consistency  Consistencies Administered During the Evaluation:   Puree (pudding)  Solid (cookie)  Thin liquid  Nectar liquid      Method of Intake:   Self Feed  Fed by Clinician  Cup  Spoon  Straw    RESULTS     ORAL PHASE:  Poor mastication: Solid  Lingual pumping: Puree and Solid  Lingual/palatal residue: Solid  Premature bolus loss to pharynx: All  Reduced tongue base retraction:All       Oral Stage Impression: Mild oral dysphagia characterized by decreased bolus propulsion with lingual pumping and decreased mastication with solid. Pt demonstrated premature entry to the valleculae, cascading to the pyriforms with all consistencies. Pt exhibited mild oral residue with solid, in which she independently cleared. PHARYNGEAL STAGE:    Delayed swallow initiation:  Solid  Premature spillage to valleculae:  All  Premature spillage to pyriform: All  Reduced tongue base: All  Shallow penetration (before): Puree        Aspiration Scale  Solid  Nectar  Thin 1 Material does not enter the airway   Puree x 1 2 Material enters the airway, remains above the vocal folds, and is ejected from the airway    3 Material enters the airway, remains above the vocal folds, and is not ejected from the airway    4 Material enters the airway, contacts the vocal folds, an is ejected from the airway    5 Material enters the airway, contacts the vocal folds, and is not ejected from the airway    6 Material enters the airway, passes below the vocal folds and is ejected into the larynx or out of the airway    7 Material enters the airway, passes below the vocal folds, and is not ejected from the trachea despite effort    8 Material enters the airway, passes below the vocal folds, and no effort is made to eject. Pharyngeal Stage Impression:  Mild pharyngeal dysphagia characterized by decreased base of tongue retraction and delayed initiation of swallow with solid. Pt exhibited shallow penetration of puree x1 which immediately cleared. No other occurrences of penetration. Pt tolerated consecutive drinks of thin from straw without difficulty. No aspiration occurred during study. CERVICAL ESOPHAGEAL STAGE :   WFL             PLAN OF CARE:   Long Term Goals:    1. Patient will tolerate least restrictive diet with no overt s/s of difficulty or aspiration. Short Term Goals:   Pt to be seen 2-3x/week  1. Pt will complete lingual exercises that promote anterior to posterior propulsion of bolus and improve tongue base retraction with 90% accuracy in order to strengthen the muscles of the swallow to decrease risk of aspiration and to increase ability to safely handle the least restrictive diet level. 2.  Pt will tolerate therapeutic trials of solid with demonstrating no overt s/s of difficulty or aspiration on 100% trials.   3.  Pt will tolerate the recommended diet level with no s/s of aspiration. Prognosis for improvements is: Good,  motivation      Pain Assessment:  Initial Assessment:  Patient denies pain. Re-assessment:  Patient denies pain. Radiologist:  Available on Consult as needed, Radiology Tech present    Treatment goals were discussed with the:   Patient. , who gives verbal understanding of recommendations. Thank you for your referral.  Please direct any questions or concerns to Speech Pathology. Images are available through [facility]/PACS. Please see radiologist report for additional details. Therapy Time  SLP Individual Minutes  Time In: 1400  Time Out: 1410  Minutes: 10          Signature:  Electronically signed by Josue Renteria.  CRISTIAN [place] on 3/29/2021 at 3:33 PM

## 2021-03-29 NOTE — PROGRESS NOTES
Infectious Disease     Patient Name: Mickey Egan  Date: 3/29/2021  YOB: 1945  Medical Record Number: [id number]        ESBL E. coli UTI           Organism Abnormal  03/23/2021 10:06 PM Alen Linares   Escherichia coli ESBL    Urine Culture, Routine Abnormal  03/23/2021 10:06 PM  - [facility]   >100,000 CFU/ml   CONTACT PRECAUTIONS INDICATED   Carbapenem antibiotics are the best option for infections caused   by ESBL producing organisms.  Other antibiotic classes are   likely to result in treatment failure, even for organisms   demonstrating in vitro susceptibility. Testing Performed By    Jose Sandoval Name Director Address Valid Date Range   [address] [facility] Lizzy Pittman MD [address] [facility] [place] [address] 07/12/18 0959-Present   Narrative  Performed by: [facility]  ORDER#: [id number]                          ORDERED BY: Flora Colin   SOURCE: Urine Clean Catch                  COLLECTED:  03/23/21 22:06   [facility]. :                      RECEIVED :  03/23/21 22:06   CALL  [facility]  [facility] tel. [phone],   ESBL called & read back by Mechelle Solis, 03/26/2021 07:26, by NORCAP LODGE   Susceptibility    Escherichia coli (esbl) (1)    Antibiotic Interpretation KAYLEE Status    amoxicillin-clavulanate Resistant 4 mcg/mL     ampicillin Resistant >=32 mcg/mL     ceFAZolin Resistant >=64 mcg/mL     cefepime Resistant 2 mcg/mL     cefTRIAXone Resistant >=64 mcg/mL     ciprofloxacin Sensitive <=0.25 mcg/mL     gentamicin Sensitive <=1 mcg/mL     imipenem Sensitive <=0.25 mcg/mL     nitrofurantoin Sensitive <=16 mcg/mL     piperacillin-tazobactam Resistant <=4 mcg/mL     trimethoprim-sulfamethoxazole Sensitive <=20 mcg/mL         Chest x-rays show improvement diminishing infiltrate in left lung but still persistent areas that appears to be consistent with effusion      Review of Systems   Respiratory: Negative. Cardiovascular: Negative. Gastrointestinal: Negative. Genitourinary: Negative. Review of Systems: All 14 review of systems negative other than as stated above    Social History     Tobacco Use    Smoking status: Former Smoker     Packs/day: 1.00     Years: 59.00     Pack years: 59.00     Types: Cigarettes     Start date: 11/30/2017    Smokeless tobacco: Never Used    Tobacco comment: quit 2-3 weeks ago    Substance Use Topics    Alcohol use: Not Currently    Drug use: No         Past Medical History:   Diagnosis Date    Anxiety     Asthma     dx 2019 / has smoked since age 11    [facility]est[facility] failure[facility]     Chronic back pain     Bilateral L5 S1 Radic on emg--surprisingly worse on the left than the right--pt's symptoms and her MRI show worse on the right    Chronic obstructive pulmonary disease with acute exacerbation ([address] 10/12/2019    Depression     Fibromyalgia     Hyperlipidemia     meds > 8 yrs    Hypertension     meds > 45 yrs    On home O2     2l per n/c at bedtime mostly,     Osteoarthritis     Type II diabetes mellitus, uncontrolled ([address]     hx > 8 yrs    Unspecified sleep apnea            Past Surgical History:   Procedure Laterality Date    BACK SURGERY  2017    lumbar disc    CARDIAC CATHETERIZATION  11/3/14     DR. MIRELES / no stents    COLONOSCOPY  08/29/2016    w/polypectomy     COLONOSCOPY N/A 9/29/2020    COLONOSCOPY WITH POLYPECTOMY performed by Bethanie Munoz MD at [facility] N/A 10/7/2019    EUA HYSTEROSCOPY DILATATION AND CURETTAGE performed by Melodie Black DO at [facility]. [address], COLON, DIAGNOSTIC      EYE SURGERY      [place] with IOL OU / [address]    umbilical hernia repair    MA ESOPHAGOGASTRODUODENOSCOPY TRANSORAL DIAGNOSTIC N/A 3/24/2017    EGD ESOPHAGOGASTRODUODENOSCOPY performed by Angelique Cowart MD at [facility] ABDOMEN N/A 2/8/2018    negative findings    WY REVISE MEDIAN N/CARPAL TUNNEL SURG Left 6/5/2017    LEFT  [facility] N/A 2/8/2018    TONSILLECTOMY      as child    UPPER GASTROINTESTINAL ENDOSCOPY  08/26/2016    w/bx     UPPER GASTROINTESTINAL ENDOSCOPY N/A 2/25/2020    EGD possible biopsy performed by Kai Varela MD at [address] [place] [address] 7/1/2020    EGD PUSH ENTEROSCOPY performed by Jesus Streeter MD at [facility]         No current facility-administered medications on file prior to encounter.       Current Outpatient Medications on File Prior to Encounter   Medication Sig Dispense Refill    insulin lispro (HUMALOG) 100 UNIT/ML injection vial 18 units at each mels 1 vial 3    insulin glargine (LANTUS) 100 UNIT/ML injection vial 60 units at bedtime 3 vial 3    verapamil (CALAN SR) 120 MG extended release tablet Take 1 tablet by mouth daily 90 tablet 3    cloNIDine (CATAPRES) 0.2 MG tablet Take 1 tablet by mouth 2 times daily 180 tablet 3    rosuvastatin (CRESTOR) 40 MG tablet TAKE 1 TABLET BY MOUTH ONCE DAILY IN THE EVENING 90 tablet 0    torsemide (DEMADEX) 100 MG tablet Take 0.5 tablets by mouth daily 30 tablet 0    PARoxetine (PAXIL) 40 MG tablet TAKE 1 TABLET BY MOUTH ONCE DAILY IN THE MORNING 90 tablet 1    ACCU-CHEK PHYLLIS PLUS strip Test 3x daily Dx E11.65 100 each 3    Accu-Chek Softclix Lancets MISC bid 100 each 3    Blood Glucose Monitoring Suppl (ACCU-CHEK PHYLLIS CONNECT) w/Device KIT 1 kit by Does not apply route daily 1 kit 0    blood glucose test strips (ACCU-CHEK PHYLLIS PLUS) strip Patient test 3x daily E65.11 100 each 3    ipratropium-albuterol (DUONEB) 0.5-2.5 (3) MG/3ML SOLN nebulizer solution Inhale 3 mLs into the lungs 3 times daily 360 mL 0    Continuous Blood Gluc Sensor (FREESTYLE MURALI 14 DAY SENSOR) MISC Use every 2 weeks 2 each 1    Continuous Blood Gluc  (FREESTYLE MURALI 14 DAY READER) [place] As directed 1 Device 0    traMADol (ULTRAM) 50 MG tablet Take 50 mg by mouth every 6 hours as needed for Pain.  isosorbide mononitrate (IMDUR) 60 MG extended release tablet Take 1 tablet by mouth daily 30 tablet 3    carvedilol (COREG) 25 MG tablet Take 1 tablet by mouth 2 times daily 60 tablet 3    spironolactone (ALDACTONE) 25 MG tablet Take 1 tablet by mouth daily 30 tablet 3    pantoprazole (PROTONIX) 40 MG tablet Take 40mg twice daily (1st dose 30min before breakfast) for 30 days. After 30 days, you make take 40mg once daily before breakfast. 60 tablet 3    dicyclomine (BENTYL) 10 MG capsule Take 1 capsule by mouth 4 times daily as needed (abdominal pain) 120 capsule 3    ondansetron (ZOFRAN) 4 MG tablet Take 1 tablet by mouth every 8 hours as needed for Nausea or Vomiting 30 tablet 2       Allergies   Allergen Reactions    Ibuprofen Nausea Only    Metformin And Related      Diarrhea      Darvon [Propoxyphene Hcl] Nausea And Vomiting         Family History   Problem Relation Age of Onset    Heart Disease Father         cardiac bypass    Arthritis Father     Arthritis Mother     Other Mother          at age 80    Other Sister         [facility]    No Known Problems Daughter     Stroke Son          Physical Exam:      Physical Exam   Cardiovascular: Normal heart sounds. No murmur heard. Pulmonary/Chest: Effort normal and breath sounds normal. No respiratory distress. She has no wheezes. She has no rales. She exhibits no tenderness. Comfortable wearing nasal cannula oxygen talking in full sentences   Abdominal: Soft. She exhibits no distension and no mass. There is no abdominal tenderness. There is no rebound and no guarding. Genitourinary:    Genitourinary Comments: Sanchez catheter in clear urine         Blood pressure (!) 141/63, pulse 78, temperature 98.8 °F (37.1 °C), temperature source Oral, resp.  rate 18, height 4' 11\" (1.499 m), weight 151 lb 4.8 oz (68.6 kg), SpO2 100 %, not currently breastfeeding.       .   Lab Results   Component Value Date    WBC 11.5 (H) 03/29/2021    HGB 9.1 (L) 03/29/2021    HCT 27.6 (L) 03/29/2021    MCV 89.3 03/29/2021     (H) 03/29/2021     Lab Results   Component Value Date     03/29/2021    K 4.5 03/29/2021    K 4.4 10/19/2020     03/29/2021    CO2 24 03/29/2021    BUN 40 03/29/2021    CREATININE 1.30 03/29/2021    GLUCOSE 249 03/29/2021    CALCIUM 10.0 03/29/2021                ASSESSMENT:  Patient Active Problem List   Diagnosis    Hypertension    Hyperlipidemia    Uncontrolled type 2 diabetes mellitus with complication, without long-term current use of insulin (HCC)    Fibromyalgia    Anxiety    Smoker    Insomnia    DJD (degenerative joint disease), lumbar    Lumbosacral radiculopathy at S1    DDD (degenerative disc disease), lumbar    Dysphonia    CTS (carpal tunnel syndrome)    High risk medication use-Norco - 12/20/17 O[facility] PM&R, 02/20/18 O[facility] PM&R, 03/07/18 [facility] PM&R, Urine Drug screen negative 02/06/17 PM&R--MED CONTRACT 2/6/17    SOB (shortness of breath) on exertion    Chest pain    Memory deficit    Artificial lens present    Presbyopia    Astigmatism, regular    Cataract, nuclear sclerotic senile    IDDM (insulin dependent diabetes mellitus)    Regular astigmatism    Nuclear senile cataract    Neck pain    Lumbosacral radiculopathy at L5    Spinal stenosis of lumbar region with neurogenic claudication    Impaired mobility and activities of daily living    Non-compliant patient NO showed FU 1/10/17 Dr Mcconnell Layarmin Insomnia secondary to chronic pain    Reactive depression    Diabetic radiculopathy (HCC)    Cervical radicular pain    Diabetic asymmetric polyneuropathy (HCC)    Myalgia    Intercostal neuropathy    Osteoarthritis of spine with radiculopathy, lumbar region    Acute combined systolic and diastolic CHF,

## 2021-03-29 NOTE — PROGRESS NOTES
Physical Therapy Missed Treatment   Facility/Department: [facility] MED SURG S318/S738-74    NAME: Brent Paredes  Patient Status:   : 1945 (67 y.o.)  MRN: [id number]  Account: [de-identified]  Gender: female        [x] Patient Declines PT Treatment            [] Patient Unavailable:     RN advised that PT had been at multiple tests today and was extremely fatigued. Offered PT to pt and she declined but agreed to have PT come back tomorrow. Will attempt PT Treatment again at earliest convenience.         Electronically signed by Bri Santos PTA on 3/29/21 at 3:45 PM EDT

## 2021-03-29 NOTE — PROGRESS NOTES
[facility]  [facility]  Speech Language Pathology   Treatment Note          Jose Thornton  1945  [id number]/[id number]    Medical Dx: Cardiac arrest [facility]) [I46.9]  Speech Dx: Dysphagia     3/29/2021    Subjective:  Alert, Cooperative and Pleasant        Interventions used this date:  Dysphagia Treatment    Objective/Assessment:  Patient progressing towards goals:  Short-term Goals  Timeframe for Short-term Goals: 1 week  Goal 2: Pt will tolerate trials of minced and moist consistencies and nectar thick liquids with no overt s/s of aspiration in order to achieve least restrictive diet consistency. Pt consumed nectar thick liquids on own via cup without overt s/s of aspiration. Pt presented with good oral control of nectar thick liquids. ST cued pt to consume small-single sips. Pt was I with small-single sips. Goal 5: Within 1-5 days of the implementation of ST POC, pt will participate in MBS study in order to more objectively assess pharyngeal phase of swallow and determine least restrictive diet consistency. MBS recommended this date to determine if liquids can be upgraded to thin liquids. Compensatory Swallowing Strategies: Eat/Feed slowly, Upright as possible for all oral intake, Small bites/sips     Pt consumed thin liquids via cup by small-single sips without overt s/s of aspiration with 3/3. Reduced oral control was noted with thin liquids compared to thickened liquids. Vocal quality was hoarse, but clear. ST suspicious of premature pharyngeal entry with thin liquids, which increases the risk of aspiration before the swallow. Multiple swallows were noted with thin liquids. Pt presented with audible wheezing this date. RN, [address], aware of MBS recommendation. Recommend continue with current diet until MBS is completed.      Treatment/Activity Tolerance:  Patient tolerated treatment well    Plan:  Continue per POC    Pain Assessment:  Initial Assessment:  Patient denies pain. Re-assessment:  Patient denies pain. Patient/Caregiver Education:  Patient educated on session and progression towards goals.     Safety Devices:  Bed alarm in place and Call light within reach      Therapy Time  SLP Individual Minutes  Time In: 0813  Time Out: 5300  Minutes: 15            Signature: Electronically signed by CRISTIAN Cueto on 3/29/2021 at 8:30 AM

## 2021-03-29 NOTE — PROGRESS NOTES
Neurology Follow up    SUBJECTIVE: Patient seen and examined for neurology follow-up for Hypoxic encephalopathy secondary to cardiopulmonary arrest with subsequent acute hypoxic respiratory failure requiring intubation and mechanical ventilation. Patient extubated on 3/26/21. Currently alert and oriented x3, no acute distress, cooperative. Patient with hypophonia secondary to hoarseness. Does have some what appears to be oral candidiasis. Denies headache or vision changes. No dysphagia or dysarthria. Patient remains nonfocal and without seizure activity.     Current Facility-Administered Medications   Medication Dose Route Frequency Provider Last Rate Last Admin    verapamil (CALAN) tablet 120 mg  120 mg Oral BID Amy Chanel MD   120 mg at 03/29/21 0851    aspirin EC tablet 81 mg  81 mg Oral Daily Amy Chanel MD   81 mg at 03/29/21 0851    ipratropium-albuterol (DUONEB) nebulizer solution 1 ampule  1 ampule Inhalation TID Hernan Larkin DO   1 ampule at 03/29/21 0718    albuterol (PROVENTIL) nebulizer solution 2.5 mg  2.5 mg Nebulization Q2H PRN Hernan Larkin DO        insulin glargine (LANTUS) injection vial 20 Units  20 Units Subcutaneous Nightly Andree Gonzalez MD   20 Units at 03/28/21 2200    insulin lispro (HUMALOG) injection vial 0-12 Units  0-12 Units Subcutaneous TID WC Andree Gonzalez MD   4 Units at 03/29/21 0855    insulin lispro (HUMALOG) injection vial 0-6 Units  0-6 Units Subcutaneous Nightly Andree Gonzalez MD   5 Units at 03/28/21 2200    sodium chloride flush 0.9 % injection 10 mL  10 mL Intravenous 2 times per day Jonah Sewell MD   10 mL at 03/28/21 2209    sodium chloride flush 0.9 % injection 10 mL  10 mL Intravenous PRN Jonah Sewell MD        carvedilol (COREG) tablet 25 mg  25 mg Oral BID Jonah Sewell MD   25 mg at 03/29/21 0851    cloNIDine (CATAPRES) tablet 0.2 mg  0.2 mg Oral BID Jonah Sewell MD   0.2 mg at 03/29/21 0851    glucose (GLUTOSE) 40 % oral gel 15 g  15 g Oral PRN Brennon Gallo MD        dextrose 50 % IV solution  12.5 g Intravenous PRN Brennon Gallo MD        glucagon (rDNA) injection 1 mg  1 mg Intramuscular PRN Brennon Gallo MD        dextrose 5 % solution  100 mL/hr Intravenous PRN Brennon Gallo MD        meropenem (MERREM) 1,000 mg in sodium chloride 0.9 % 100 mL IVPB (mini-bag)  1,000 mg Intravenous Q12H Fawad Mehta MD   Stopped at 03/29/21 0925    0.9 % sodium chloride infusion   Intravenous PRN Zachary Hathaway DO        nitroglycerin (NITRO-BID) 2 % ointment 1 inch  1 inch Topical 4 times per day Lev Slaughter MD   1 inch at 03/29/21 0544    methylPREDNISolone sodium (SOLU-MEDROL) injection 40 mg  40 mg Intravenous Daily Brennon Gallo MD   40 mg at 03/29/21 0852    pantoprazole (PROTONIX) injection 40 mg  40 mg Intravenous Daily Brennon Gallo MD   40 mg at 03/29/21 3974    And    sodium chloride (PF) 0.9 % injection 10 mL  10 mL Intravenous Daily Brennon Gallo MD   10 mL at 03/29/21 0852    heparin (porcine) injection 5,000 Units  5,000 Units Subcutaneous 3 times per day Brennon Gallo MD   5,000 Units at 03/29/21 0544    docusate (COLACE) 50 MG/5ML liquid 100 mg  100 mg Oral BID Brennon Gallo MD   100 mg at 03/29/21 0852    polyethylene glycol (GLYCOLAX) packet 17 g  17 g Oral Daily Brennon Gallo MD   Stopped at 03/27/21 0803    budesonide (PULMICORT) nebulizer suspension 500 mcg  0.5 mg Nebulization BID Brennon Gallo MD   500 mcg at 03/29/21 0718    hydrALAZINE (APRESOLINE) injection 10 mg  10 mg Intravenous Q4H PRN Merari Draper Sedar, DO   10 mg at 03/26/21 2321    Or    hydrALAZINE (APRESOLINE) injection 20 mg  20 mg Intravenous Q4H PRN Merari Draper Sedar, DO   20 mg at 03/25/21 2051    labetalol (NORMODYNE;TRANDATE) injection 20 mg  20 mg Intravenous Q4H PRN Trae Stall, DO   20 mg at 03/24/21 1227    labetalol (NORMODYNE;TRANDATE) injection 40 mg  40 mg Intravenous Q4H PRN Trae Stall, DO   40 mg at 03/27/21 1302    promethazine (PHENERGAN) tablet 12.5 mg  12.5 mg Oral Q6H PRN Dany Gil MD        Or    ondansetron (ZOFRAN) injection 4 mg  4 mg Intravenous Q6H PRN Dany Gil MD        chlorhexidine (PERIDEX) 0.12 % solution 15 mL  15 mL Mouth/Throat BID Dany Gil MD   15 mL at 03/28/21 2150    sodium chloride flush 0.9 % injection 10 mL  10 mL Intravenous 2 times per day Dany Gil MD   10 mL at 03/28/21 2210    sodium chloride flush 0.9 % injection 10 mL  10 mL Intravenous PRN Dany Gil MD        acetaminophen (TYLENOL) tablet 650 mg  650 mg Oral Q6H PRN Dany Gil MD   650 mg at 03/25/21 2153    Or    acetaminophen (TYLENOL) suppository 650 mg  650 mg Rectal Q6H PRN Dany Gil MD   650 mg at 03/25/21 0153       PHYSICAL EXAM:    BP (!) 172/70   Pulse 73   Temp 98.4 °F (36.9 °C) (Oral)   Resp 18   Ht 4' 11\" (1.499 m)   Wt 151 lb 4.8 oz (68.6 kg)   LMP  (LMP Unknown)   SpO2 100%   BMI 30.56 kg/m²   General Appearance:      Skin:  normal  CVS - Normal sounds, No murmurs , No carotid Bruits  RS -CTA  Abdomen Soft, BS present  Review of Systems   Unable to perform ROS: Mental status change   Constitutional: Negative for chills, fever and unexpected weight change. HENT: Positive for voice change. Negative for congestion and trouble swallowing. Eyes: Negative for photophobia and visual disturbance. Respiratory: Negative for chest tightness, shortness of breath and wheezing. Cardiovascular: Negative for chest pain and leg swelling. Gastrointestinal: Negative for diarrhea, nausea and vomiting. Endocrine: Negative. Genitourinary: Negative. Musculoskeletal: Negative for back pain, gait problem and neck stiffness. Skin: Negative for rash. Allergic/Immunologic: Negative. Neurological: Negative for dizziness, tremors, seizures, syncope, facial asymmetry, speech difficulty, weakness, light-headedness, numbness and headaches.    Hematological: Negative. Psychiatric/Behavioral: Negative for confusion, hallucinations and sleep disturbance. Mental Status Exam:             She is much more alert, awake, follows commands and does not appear to be in any acute distress. Funduscopic Exam: Deferred    Cranial Nerves: Face does appear again to be symmetric V1 V2 and V3 are intact good shoulder shrug present bilaterally. Cranial nerve III           Pupils:  equal, round, reactive to light      Cranial nerves III, IV, VI           Extraocular Movements: intact        Motor: Patient is able to lift all 4 extremities antigravity she is able to perform lifting both arms and legs off bed for greater than 5 seconds.           Sensory: Unable to perform        Pinprick             Right Upper Extremity:  normal             Left Upper Extremity:  normal             Right Lower Extremity:  normal             Left Lower Extremity:  normal           Vibration                         Touch            Proprioception                 Coordination:           Finger/Nose   Right:  normal              Left:  normal          Heel-Knee-Shin                Right:  normal              Left:  normal          Rapid Alternating Movements              Right:  normal              Left:  normal          Gait:                       Casual:  normal                         Romberg:  normal            Reflexes:             Deep Tendon Reflexes:             Reflexes are 2 +             Plantar response:                Right:  downgoing               Left:  downgoing    Vascular:  Cardiac Exam:  normal         Echocardiogram Complete 2d With Doppler With Color    Result Date: 3/19/2021  Transthoracic Echocardiography Report (TTE)  Demographics  Patient Name   Lucillie Monday  Gender              Female                 M  Patient Number [id number]        Race                Inge Wright                                 Ethnicity  Visit Number   [id number]       Room Number         NB84 [employer] ID                   Date of Study       03/19/2021  Accession      [id number]      Referring Physician  Number  Date of Birth  1945      Sonographer         Victor Hugo Hahn, Jaki Kong LPN  Age            67 year(s)      [place] [id number] [facility]                                 Physician           Josue Smith MD Procedure Type of Study  TTE procedure:ECHO COMPLETE 2D W/DOP W/COLOR. Procedure Date Date: 03/19/2021 Start: 09:15 AM Study Location: Portable Technical Quality: Good visualization Indications:Cardiac arrest. Patient Status: Routine Weight: 143 pounds BP: 89/48 mmHg Allergies   - Other allergy:(Darvon). Conclusions  Summary  Mitral annular calcification is present. 1-2+ MR  Normal tricuspid valve structure and function. 1+ TR  RVSP 31 mmHg  severe [facility]  LVEF 50%  E/A flow reversal noted. Suggestive of diastolic dysfunction. Signature  ----------------------------------------------------------------  Electronically signed by Josue Smith MD(Interpreting  physician) on 03/19/2021 02:13 PM  ----------------------------------------------------------------  Findings Left Ventricle severe [facility] LVEF 50% E/A flow reversal noted. Suggestive of diastolic dysfunction. Right Ventricle Normal right ventricle structure and function. Normal right ventricle systolic pressure. Left Atrium Moderately dilated left atrium. Right Atrium Normal right atrium. Mitral Valve Mitral annular calcification is present. 1-2+ MR Tricuspid Valve Normal tricuspid valve structure and function. 1+ TR RVSP 31 mmHg Aortic Valve Normal aortic valve structure and function. Pulmonic Valve The pulmonic valve was not well visualized . Pericardial Effusion No evidence of pericardial effusion. Pleural Effusion No evidence of pleural effusion. Aorta \ Miscellaneous The aorta is within normal limits.  M-Mode Measurements (cm)  LVIDd: 4.01 cm LVIDs: 1.85 cm  IVSd: 1.45 cm                          IVSs: 1.23 cm  LVPWd: 1.32 cm                         LVPWs: 1.61 cm  Rt. Vent.  Dimension: 1.07 cm           AO Root Dimension: 2.8 cm                                         ACS: 1.47 cm                                         LA: 3.04 cm                                         LVOT: 1.98 cm Doppler Measurements:  AV Velocity:0.01 m/s                   MV Peak E-Wave: 0.96 m/s  AV Peak Gradient: 7.73 mmHg            MV Peak A-Wave: 1.58 m/s  AV Mean Gradient: 3.01 mmHg  AV Area (Continuity):1.27 cm^2         MV P1/2t: 105.6 msec  TR Velocity:2.29 m/s                   MVA by PHT2.08 cm^2  TR Gradient:21.01 mmHg                 Estimated RAP:10 mmHg                                         RVSP:31 mmHg Valves  Mitral Valve  Peak E-Wave: 0.96 m/s          Peak A-Wave: 1.58 m/s  P1/2t: 105.6 msec              E/A Ratio: 0.61                                 Peak Gradient: 3.7 mmHg  Area (PHT): 2.08 cm^2          Deceleration Time: 349.6 msec  MR Velocity: 4.66 m/s  Aortic Valve  Peak Velocity: 1.39 m/s                Mean Velocity: 0.79 m/s  Peak Gradient: 7.73 mmHg               Mean Gradient: 3.01 mmHg  Area (continuity): 1.27 cm^2  AV VTI: 28.28 cm  Cusp Separation: 1.47 cm  Tricuspid Valve  Estimated RVSP: 31 mmHg              Estimated RAP: 10 mmHg  TR Velocity: 2.29 m/s                TR Gradient: 21.01 mmHg  Pulmonic Valve  Peak Velocity: 0.68 m/s           Peak Gradient: 1.86 mmHg                                    Estimated PASP: 31.01 mmHg  LVOT  Peak Velocity: 0.72 m/s              Mean Velocity: 0.41 m/s  Peak Gradient: 2 mmHg                Mean Gradient: 0.9 mmHg  LVOT Diameter: 1.98 cm               LVOT VTI: 11.64 cm Structures  Left Atrium  LA Dimension: 3.04 cm                 LA Area: 18.08 cm^2  LA/Aorta: 1.09  LA Volume/Index: 49.97 ml  Left Ventricle  Diastolic Dimension: 8.10 cm         Systolic Dimension: 0.52 cm  Septum Diastolic: 1.78 cm Septum Systolic: 1.06 cm  PW Diastolic: 2.78 cm                PW Systolic: 9.56 cm                                       FS: 53.9 %  LV EDV/LV EDV Index: 70.29 ml        LV ESV/LV ESV Index: 10.47 ml  EF Calculated: 85.1 %  LVOT Diameter: 1.98 cm  Right Ventricle  Diastolic Dimension: 6.91 cm                                    RV Systolic Pressure: 46.91 mmHg Aorta/ Miscellaneous Aorta  Aortic Root: 2.8 cm  LVOT Diameter: 1.98 cm    Xr Chest Portable    Result Date: 3/19/2021  EXAMINATION: XR CHEST PORTABLE CLINICAL HISTORY: INTUBATION COMPARISONS: OCTOBER 9, 2020 FINDINGS: Endotracheal tube has migrated 3 cm inferiorly, just lying cephalad to mojgan. Nasogastric tube courses beneath diaphragm. Osseous structures intact. Cardiopericardial silhouette normal. Aorta calcified. Ill-defined area increased opacity right  lung base. RIGHT LOWER LUNG SUBSEGMENTAL ATELECTASIS/PNEUMONIA. INTERVAL INFERIOR MIGRATION ENDOTRACHEAL TUBE 3 CM WITH TIP NOW JUST PROXIMAL TO MOJGAN. MAY WISH TO RETRACT ENDOTRACHEAL TUBE 2 CM. Xr Chest Portable    Result Date: 3/19/2021  EXAMINATION: XR CHEST PORTABLE CLINICAL HISTORY: RESPIRATORY FAILURE COMPARISONS: MARCH 16, 2021 FINDINGS: Endotracheal tube tip 2.3 cm superior to mojgan. Nasogastric tube courses beneath diaphragm area osseous structures intact. Ill-defined areas increase opacity at lung bases. Cardiopericardial silhouette normal.     BIBASILAR SUBSEGMENTAL ATELECTASIS/PNEUMONIA IN THIS PARTIALLY EXPIRATORY CHEST RADIOGRAPH.       Recent Labs     03/27/21 0518 03/28/21  0504 03/29/21  0513   WBC 16.2* 9.2 11.5*   HGB 10.9* 10.2* 9.1*   * 460* 431*     Recent Labs     03/27/21 0518 03/28/21  0504 03/29/21  0513   * 149* 138   K 5.1* 4.8 4.5   * 115* 106   CO2 19* 25 24   BUN 60* 53* 40*   CREATININE 1.48* 1.42* 1.30*   GLUCOSE 85 175* 249*     Recent Labs     03/27/21 0518 03/28/21  0504 03/29/21  0513   BILITOT <0.2 <0.2 <0.2   ALKPHOS 100 97 99 AST 26 16 21   ALT 31 26 28     Lab Results   Component Value Date    PROTIME 14.4 03/29/2021    INR 1.1 03/29/2021     No results found for: LITHIUM, DILFRTOT, VALPROATE    ASSESSMENT AND PLAN  Encephalopathy secondary to cardiorespiratory arrest.  Patient may have suffered some hypoxemic damage though this very difficult to certain as patient is now extubated and off sedation   and she does appear to be improving each day. .  We will keep an eye on this repeat CT scan did not anything significant as far as mass lesions or structural lesions no early CT sign changes seen to suggest any acute ischemia and no evidence of any gray-white matter changes to suggest severe hypoxic ischemic changes. The patient does appear to be tolerating diet at this point which we will advance as tolerated. Patient may benefit from rehabilitation stay with cognitive and speech therapy. Hypoxic encephalopathy secondary to cardiopulmonary arrest with subsequent acute hypoxic respiratory failure requiring intubation and mechanical ventilation. Patient extubated on 3/26/21  Acute on chronic anemia with hemoglobin of 5.8 on arrival status post transfusion  Patient remains hypertensive with history of hypertensive cardiomyopathy. Patient with known moderate right renal artery stenosis attributing to resistant hypertension  DNR CCA, palliative care on consult  Encephalopathy has resolved. Nonfocal.  No seizure activity reported. Continue to follow    I have personally performed a face to face diagnostic evaluation on this patient, reviewed all data and investigations, and am the sole provider of all clinical decisions on the neurological status of this patient. exams shows nonfocal findings , better now      Mehul Copeland MD, Shala Quach, American Board of Psychiatry & Neurology  Board Certified in Vascular Neurology  Board Certified in Neuromuscular Medicine  Certified in . [address]

## 2021-03-29 NOTE — PROGRESS NOTES
Progress Note  Patient: Mickey Egan  Unit/Bed: K499/E163-58  YOB: 1945  MRN: [id number]  Acct: [de-identified]   Admitting Diagnosis: Cardiac arrest [facility]) [I46.9]  Admit Date:  3/18/2021  Hospital Day: 6    Chief Complaint: CPA    Histories:  Past Medical History:   Diagnosis Date    Anxiety     Asthma     dx 2019 / has smoked since age 14    CHF (congestive heart failure) (HCC)     Chronic back pain     Bilateral L5 S1 Radic on emg--surprisingly worse on the left than the right--pt's symptoms and her MRI show worse on the right    Chronic obstructive pulmonary disease with acute exacerbation ([facility] 10/12/2019    Depression     Fibromyalgia     Hyperlipidemia     meds > 8 yrs    Hypertension     meds > 45 yrs    On home O2     2l per n/c at bedtime mostly,     Osteoarthritis     Type II diabetes mellitus, uncontrolled ([facility]     hx > 8 yrs    Unspecified sleep apnea      Past Surgical History:   Procedure Laterality Date    BACK SURGERY  2017    lumbar disc    CARDIAC CATHETERIZATION  11/3/14     DR. MIRELES / no stents    COLONOSCOPY  08/29/2016    w/polypectomy     COLONOSCOPY N/A 9/29/2020    COLONOSCOPY WITH POLYPECTOMY performed by Gonzalo Faustin MD at [facility] N/A 10/7/2019    EUA HYSTEROSCOPY DILATATION AND CURETTAGE performed by Ally Devi DO at [facility]. [address], COLON, DIAGNOSTIC      EYE SURGERY      [place] with IOL OU / [address]    umbilical hernia repair    ME ESOPHAGOGASTRODUODENOSCOPY TRANSORAL DIAGNOSTIC N/A 3/24/2017    EGD ESOPHAGOGASTRODUODENOSCOPY performed by Teodora Jenkins MD at [facility] 2/8/2018    negative findings    ME REVISE MEDIAN N/CARPAL TUNNEL SURG Left 6/5/2017    LEFT  CARPAL TUNNEL RELEASE performed by Mason Bartlett MD at [facility],4+S/L,FZEZY N/A 2/8/2018    TONSILLECTOMY      as child  UPPER GASTROINTESTINAL ENDOSCOPY  2016    w/bx     UPPER GASTROINTESTINAL ENDOSCOPY N/A 2020    EGD possible biopsy performed by Adriana Riley MD at [place] [address] N/A 2020    EGD PUSH ENTEROSCOPY performed by Chelly Estrada MD at [facility]     Family History   Problem Relation Age of Onset    Heart Disease Father         cardiac bypass    Arthritis Father     Arthritis Mother     Other Mother          at age 80    Other Sister         nuknown health hx    No Known Problems Daughter     Stroke Son      Social History     Socioeconomic History    Marital status:      Spouse name: Harlan Rodriguez Number of children: 2    Years of education: 15    Highest education level: High school graduate   Occupational History    Occupation: Retired-   Social Needs    Financial resource strain: Not hard at all   [place] insecurity     Worry: Never true     Inability: Never true    Transportation needs     Medical: No     Non-medical: No   Tobacco Use    Smoking status: Former Smoker     Packs/day: 1.00     Years: 59.00     Pack years: 59.00     Types: Cigarettes     Start date: 2017    Smokeless tobacco: Never Used    Tobacco comment: quit 2-3 weeks ago    Substance and Sexual Activity    Alcohol use: Not Currently    Drug use: No    Sexual activity: Yes     Partners: Male   Lifestyle    Physical activity     Days per week: 0 days     Minutes per session: 0 min    Stress: Only a little   Relationships    Social connections     Talks on phone: More than three times a week     Gets together: More than three times a week     Attends Yazidi service: 1 to 4 times per year     Active member of club or organization: No     Attends meetings of clubs or organizations: Never     Relationship status: Living with partner   Hank Ivan Intimate partner violence     Fear of current or ex partner: No     Emotionally abused:  No Physically abused: No     Forced sexual activity: No   Other Topics Concern    None   Social History Narrative    Grew up in [place]     Lives With:  [id number] Spouse    Type of Home: House    Home Layout: Two level, Performs ADL's on one level    Home Access: Stairs to enter with rails    Entrance Stairs - Number of Steps: 4    Bathroom Shower/Tub: Tub/Shower unit, Doors    Bathroom Equipment: Shower chair, Grab bars in [facility]: Rolling walker, Cane, Oxygen(uses her O2 very seldom)    ADL Assistance: Needs assistance    Homemaking Assistance: Needs assistance    Homemaking Responsibilities: No(spouse performs)    Ambulation Assistance: Independent    Transfer Assistance: Independent    Active : No    Occupation: Retired    Type of occupation: worked in [employer]: patient enjoys televsision       Subjective/HPI doing well post extubation on Friday. Hoarse. Thirsty. Not eating much. Transferred out to 1W yesterday. EKG: ST 77      Review of Systems:   Review of Systems   Constitutional: Negative. Negative for diaphoresis and fatigue. HENT: Positive for sore throat. Eyes: Negative. Respiratory: Negative. Negative for cough, chest tightness, shortness of breath, wheezing and stridor. Cardiovascular: Negative. Negative for chest pain, palpitations and leg swelling. Gastrointestinal: Negative. Negative for blood in stool and nausea. Genitourinary: Negative. Musculoskeletal: Negative. Skin: Negative. Neurological: Positive for weakness. Negative for dizziness, syncope and light-headedness. Hematological: Negative. Psychiatric/Behavioral: Negative. Physical Examination:    BP (!) 167/68   Pulse 96   Temp 99.3 °F (37.4 °C) (Oral)   Resp 18   Ht 4' 11\" (1.499 m)   Wt 151 lb 4.8 oz (68.6 kg)   LMP  (LMP Unknown)   SpO2 100%   BMI 30.56 kg/m²    Physical Exam   Constitutional: She appears healthy. No distress.    HENT:   Normal cephalic and Atraumatic   Eyes: Pupils are equal, round, and reactive to light. Neck: Normal range of motion and thyroid normal. Neck supple. No JVD present. No neck adenopathy. No thyromegaly present. Cardiovascular: Normal rate, regular rhythm, intact distal pulses and normal pulses. Murmur heard. Pulmonary/Chest: Effort normal and breath sounds normal. She has no wheezes. She has no rales. She exhibits no tenderness. Abdominal: Soft. Bowel sounds are normal. There is no abdominal tenderness. Musculoskeletal: Normal range of motion. General: No tenderness or edema. Neurological: She is alert and oriented to person, place, and time. Skin: Skin is warm. No cyanosis. Nails show no clubbing.        LABS:  CBC:   Lab Results   Component Value Date    WBC 11.5 03/29/2021    RBC 3.08 03/29/2021    HGB 9.1 03/29/2021    HCT 27.6 03/29/2021    MCV 89.3 03/29/2021    MCH 29.4 03/29/2021    MCHC 32.9 03/29/2021    RDW 16.6 03/29/2021     03/29/2021    MPV 8.8 08/01/2015     CBC with Differential:    Lab Results   Component Value Date    WBC 11.5 03/29/2021    RBC 3.08 03/29/2021    HGB 9.1 03/29/2021    HCT 27.6 03/29/2021     03/29/2021    MCV 89.3 03/29/2021    MCH 29.4 03/29/2021    MCHC 32.9 03/29/2021    RDW 16.6 03/29/2021    NRBC 1 08/28/2020    LYMPHOPCT 11.6 03/29/2021    MONOPCT 7.4 03/29/2021    BASOPCT 0.2 03/29/2021    MONOSABS 0.9 03/29/2021    LYMPHSABS 1.3 03/29/2021    EOSABS 0.0 03/29/2021    BASOSABS 0.0 03/29/2021     CMP:    Lab Results   Component Value Date     03/29/2021    K 4.5 03/29/2021    K 4.4 10/19/2020     03/29/2021    CO2 24 03/29/2021    BUN 40 03/29/2021    CREATININE 1.30 03/29/2021    GFRAA 48.1 03/29/2021    LABGLOM 39.8 03/29/2021    GLUCOSE 249 03/29/2021    PROT 5.9 03/29/2021    LABALBU 2.9 03/29/2021    CALCIUM 10.0 03/29/2021    BILITOT <0.2 03/29/2021    ALKPHOS 99 03/29/2021    AST 21 03/29/2021    ALT 28 03/29/2021     BMP:    Lab Results   Component Value Date     03/29/2021    K 4.5 03/29/2021    K 4.4 10/19/2020     03/29/2021    CO2 24 03/29/2021    BUN 40 03/29/2021    LABALBU 2.9 03/29/2021    CREATININE 1.30 03/29/2021    CALCIUM 10.0 03/29/2021    GFRAA 48.1 03/29/2021    LABGLOM 39.8 03/29/2021    GLUCOSE 249 03/29/2021     Magnesium:    Lab Results   Component Value Date    MG 1.9 03/29/2021     Troponin:    Lab Results   Component Value Date    TROPONINI 0.056 03/19/2021        Active Hospital Problems    Diagnosis Date Noted    Acute respiratory failure with hypoxia ([facility]) [J96.01]      Priority: Low    Cardiac arrest ([facility] [I46.9] 03/18/2021     Priority: Low        Assessment/Plan:  1. CPA   2. Respiratory failure-extubated. Stable  3. Aspiration PNA - on ABX  4. Encephalopathy-resolved  5. CAD - LAD mild - will resume low dose ASA  6. HTN - still labile. Advance Verapamil to 120 bid. This will also help lower HR. 7. LVEF 55%  8. Echo Severe LVH. 1-2+ MR RVSP 31 mmHg. LVEF 50%  9. History of HCM - need to Keep HR close to 60s. Continue and nitrate. We tera change to PO form soon. 10. SAÚL - Nephrology following- stable. She has known moderate Right LYNDON from [address] 6/30/2020 - we will address this by CTA in future as out pt. 11. Accelerated HTN- adjust meds.    12. PT OT      Electronically signed by Angela Quinn MD on 3/29/2021 at 7:29 AM

## 2021-03-30 ENCOUNTER — APPOINTMENT (OUTPATIENT)
Dept: GENERAL RADIOLOGY | Age: 76
DRG: 296 | End: 2021-03-30
Payer: MEDICARE

## 2021-03-30 LAB
ALBUMIN SERPL-MCNC: 2.9 G/DL (ref 3.5–4.6)
ANION GAP SERPL CALCULATED.3IONS-SCNC: 11 MEQ/L (ref 9–15)
BASOPHILS ABSOLUTE: 0.1 K/UL (ref 0–0.2)
BASOPHILS RELATIVE PERCENT: 0.4 %
BUN BLDV-MCNC: 39 MG/DL (ref 8–23)
C-REACTIVE PROTEIN, HIGH SENSITIVITY: 5.6 MG/L (ref 0–5)
CALCIUM IONIZED, CALC AT PH 7.4: 1.41 MMOL/L (ref 1.11–1.3)
CALCIUM IONIZED: 1.42 MMOL/L (ref 1.11–1.3)
CALCIUM SERPL-MCNC: 10.3 MG/DL (ref 8.5–9.9)
CHLORIDE BLD-SCNC: 101 MEQ/L (ref 95–107)
CO2: 26 MEQ/L (ref 20–31)
CREAT SERPL-MCNC: 1.25 MG/DL (ref 0.5–0.9)
EOSINOPHILS ABSOLUTE: 0 K/UL (ref 0–0.7)
EOSINOPHILS RELATIVE PERCENT: 0.1 %
GFR AFRICAN AMERICAN: 50.3
GFR NON-AFRICAN AMERICAN: 41.6
GLUCOSE BLD-MCNC: 185 MG/DL (ref 70–99)
GLUCOSE BLD-MCNC: 187 MG/DL (ref 60–115)
GLUCOSE BLD-MCNC: 196 MG/DL (ref 60–115)
GLUCOSE BLD-MCNC: 373 MG/DL (ref 60–115)
GLUCOSE BLD-MCNC: 429 MG/DL (ref 60–115)
GLUCOSE BLD-MCNC: 496 MG/DL (ref 60–115)
HCT VFR BLD CALC: 28.4 % (ref 37–47)
HEMOGLOBIN: 9.2 G/DL (ref 12–16)
LYMPHOCYTES ABSOLUTE: 1.2 K/UL (ref 1–4.8)
LYMPHOCYTES RELATIVE PERCENT: 8.6 %
MAGNESIUM: 1.7 MG/DL (ref 1.7–2.4)
MCH RBC QN AUTO: 29.1 PG (ref 27–31.3)
MCHC RBC AUTO-ENTMCNC: 32.5 % (ref 33–37)
MCV RBC AUTO: 89.5 FL (ref 82–100)
MONOCYTES ABSOLUTE: 1 K/UL (ref 0.2–0.8)
MONOCYTES RELATIVE PERCENT: 7.3 %
NEUTROPHILS ABSOLUTE: 11.9 K/UL (ref 1.4–6.5)
NEUTROPHILS RELATIVE PERCENT: 83.6 %
PDW BLD-RTO: 15.9 % (ref 11.5–14.5)
PERFORMED ON: ABNORMAL
PHOSPHORUS: 3.1 MG/DL (ref 2.3–4.8)
PLATELET # BLD: 448 K/UL (ref 130–400)
POTASSIUM SERPL-SCNC: 4.7 MEQ/L (ref 3.4–4.9)
PRO-BNP: 7208 PG/ML
PROCALCITONIN: 0.08 NG/ML (ref 0–0.15)
RBC # BLD: 3.17 M/UL (ref 4.2–5.4)
SODIUM BLD-SCNC: 138 MEQ/L (ref 135–144)
WBC # BLD: 14.2 K/UL (ref 4.8–10.8)

## 2021-03-30 PROCEDURE — 2580000003 HC RX 258: Performed by: INTERNAL MEDICINE

## 2021-03-30 PROCEDURE — 83880 ASSAY OF NATRIURETIC PEPTIDE: CPT

## 2021-03-30 PROCEDURE — 94761 N-INVAS EAR/PLS OXIMETRY MLT: CPT

## 2021-03-30 PROCEDURE — 80069 RENAL FUNCTION PANEL: CPT

## 2021-03-30 PROCEDURE — 36415 COLL VENOUS BLD VENIPUNCTURE: CPT

## 2021-03-30 PROCEDURE — 2060000000 HC ICU INTERMEDIATE R&B

## 2021-03-30 PROCEDURE — 99232 SBSQ HOSP IP/OBS MODERATE 35: CPT | Performed by: INTERNAL MEDICINE

## 2021-03-30 PROCEDURE — 92526 ORAL FUNCTION THERAPY: CPT

## 2021-03-30 PROCEDURE — 84145 PROCALCITONIN (PCT): CPT

## 2021-03-30 PROCEDURE — 6370000000 HC RX 637 (ALT 250 FOR IP): Performed by: INTERNAL MEDICINE

## 2021-03-30 PROCEDURE — 83735 ASSAY OF MAGNESIUM: CPT

## 2021-03-30 PROCEDURE — 99232 SBSQ HOSP IP/OBS MODERATE 35: CPT | Performed by: NURSE PRACTITIONER

## 2021-03-30 PROCEDURE — APPSS15 APP SPLIT SHARED TIME 0-15 MINUTES: Performed by: NURSE PRACTITIONER

## 2021-03-30 PROCEDURE — 71045 X-RAY EXAM CHEST 1 VIEW: CPT

## 2021-03-30 PROCEDURE — 6360000002 HC RX W HCPCS: Performed by: INTERNAL MEDICINE

## 2021-03-30 PROCEDURE — 85025 COMPLETE CBC W/AUTO DIFF WBC: CPT

## 2021-03-30 PROCEDURE — 97535 SELF CARE MNGMENT TRAINING: CPT

## 2021-03-30 PROCEDURE — 86141 C-REACTIVE PROTEIN HS: CPT

## 2021-03-30 PROCEDURE — 2700000000 HC OXYGEN THERAPY PER DAY

## 2021-03-30 PROCEDURE — 99233 SBSQ HOSP IP/OBS HIGH 50: CPT | Performed by: PSYCHIATRY & NEUROLOGY

## 2021-03-30 PROCEDURE — 94640 AIRWAY INHALATION TREATMENT: CPT

## 2021-03-30 PROCEDURE — 99232 SBSQ HOSP IP/OBS MODERATE 35: CPT | Performed by: PSYCHIATRY & NEUROLOGY

## 2021-03-30 PROCEDURE — C9113 INJ PANTOPRAZOLE SODIUM, VIA: HCPCS | Performed by: INTERNAL MEDICINE

## 2021-03-30 RX ORDER — PREDNISONE 10 MG/1
10 TABLET ORAL DAILY
Status: DISCONTINUED | OUTPATIENT
Start: 2021-04-06 | End: 2021-04-02 | Stop reason: HOSPADM

## 2021-03-30 RX ORDER — PREDNISONE 10 MG/1
20 TABLET ORAL DAILY
Status: DISCONTINUED | OUTPATIENT
Start: 2021-04-03 | End: 2021-04-02 | Stop reason: HOSPADM

## 2021-03-30 RX ORDER — PREDNISONE 10 MG/1
30 TABLET ORAL DAILY
Status: COMPLETED | OUTPATIENT
Start: 2021-03-31 | End: 2021-04-02

## 2021-03-30 RX ADMIN — INSULIN GLARGINE 20 UNITS: 100 INJECTION, SOLUTION SUBCUTANEOUS at 20:47

## 2021-03-30 RX ADMIN — VERAPAMIL HYDROCHLORIDE 120 MG: 80 TABLET, FILM COATED ORAL at 08:17

## 2021-03-30 RX ADMIN — PREDNISONE 40 MG: 10 TABLET ORAL at 08:16

## 2021-03-30 RX ADMIN — ASPIRIN 81 MG: 81 TABLET, COATED ORAL at 08:17

## 2021-03-30 RX ADMIN — NYSTATIN 500000 UNITS: 100000 SUSPENSION ORAL at 08:17

## 2021-03-30 RX ADMIN — NITROGLYCERIN 1 INCH: 20 OINTMENT TOPICAL at 23:16

## 2021-03-30 RX ADMIN — SODIUM CHLORIDE, PRESERVATIVE FREE 10 ML: 5 INJECTION INTRAVENOUS at 20:50

## 2021-03-30 RX ADMIN — INSULIN HUMAN 15 UNITS: 100 INJECTION, SOLUTION PARENTERAL at 23:29

## 2021-03-30 RX ADMIN — NITROGLYCERIN 1 INCH: 20 OINTMENT TOPICAL at 05:00

## 2021-03-30 RX ADMIN — NITROGLYCERIN 1 INCH: 20 OINTMENT TOPICAL at 17:09

## 2021-03-30 RX ADMIN — IPRATROPIUM BROMIDE AND ALBUTEROL SULFATE 1 AMPULE: .5; 3 SOLUTION RESPIRATORY (INHALATION) at 12:43

## 2021-03-30 RX ADMIN — CARVEDILOL 25 MG: 25 TABLET, FILM COATED ORAL at 08:17

## 2021-03-30 RX ADMIN — HEPARIN SODIUM 5000 UNITS: 5000 INJECTION INTRAVENOUS; SUBCUTANEOUS at 13:09

## 2021-03-30 RX ADMIN — MEROPENEM 1000 MG: 1 INJECTION, POWDER, FOR SOLUTION INTRAVENOUS at 20:50

## 2021-03-30 RX ADMIN — BUDESONIDE 500 MCG: 0.5 SUSPENSION RESPIRATORY (INHALATION) at 07:23

## 2021-03-30 RX ADMIN — HEPARIN SODIUM 5000 UNITS: 5000 INJECTION INTRAVENOUS; SUBCUTANEOUS at 05:00

## 2021-03-30 RX ADMIN — CLONIDINE HYDROCHLORIDE 0.2 MG: 0.1 TABLET ORAL at 08:17

## 2021-03-30 RX ADMIN — NITROGLYCERIN 1 INCH: 20 OINTMENT TOPICAL at 00:56

## 2021-03-30 RX ADMIN — VERAPAMIL HYDROCHLORIDE 120 MG: 80 TABLET, FILM COATED ORAL at 20:47

## 2021-03-30 RX ADMIN — PANTOPRAZOLE SODIUM 40 MG: 40 INJECTION, POWDER, FOR SOLUTION INTRAVENOUS at 08:12

## 2021-03-30 RX ADMIN — NYSTATIN 500000 UNITS: 100000 SUSPENSION ORAL at 17:09

## 2021-03-30 RX ADMIN — HEPARIN SODIUM 5000 UNITS: 5000 INJECTION INTRAVENOUS; SUBCUTANEOUS at 20:48

## 2021-03-30 RX ADMIN — Medication 10 ML: at 20:51

## 2021-03-30 RX ADMIN — SODIUM CHLORIDE, PRESERVATIVE FREE 10 ML: 5 INJECTION INTRAVENOUS at 08:12

## 2021-03-30 RX ADMIN — DOCUSATE SODIUM 100 MG: 50 LIQUID ORAL at 20:47

## 2021-03-30 RX ADMIN — CARVEDILOL 37.5 MG: 25 TABLET, FILM COATED ORAL at 20:47

## 2021-03-30 RX ADMIN — NITROGLYCERIN 1 INCH: 20 OINTMENT TOPICAL at 11:48

## 2021-03-30 RX ADMIN — MEROPENEM 1000 MG: 1 INJECTION, POWDER, FOR SOLUTION INTRAVENOUS at 08:12

## 2021-03-30 RX ADMIN — SODIUM CHLORIDE, PRESERVATIVE FREE 10 ML: 5 INJECTION INTRAVENOUS at 08:16

## 2021-03-30 RX ADMIN — Medication 10 ML: at 08:19

## 2021-03-30 RX ADMIN — NYSTATIN 500000 UNITS: 100000 SUSPENSION ORAL at 20:52

## 2021-03-30 RX ADMIN — CLONIDINE HYDROCHLORIDE 0.2 MG: 0.1 TABLET ORAL at 20:47

## 2021-03-30 RX ADMIN — NYSTATIN 500000 UNITS: 100000 SUSPENSION ORAL at 13:09

## 2021-03-30 RX ADMIN — IPRATROPIUM BROMIDE AND ALBUTEROL SULFATE 1 AMPULE: .5; 3 SOLUTION RESPIRATORY (INHALATION) at 07:26

## 2021-03-30 ASSESSMENT — ENCOUNTER SYMPTOMS
GASTROINTESTINAL NEGATIVE: 1
VOMITING: 0
EYES NEGATIVE: 1
TROUBLE SWALLOWING: 0
STRIDOR: 0
WHEEZING: 0
CONSTIPATION: 0
TROUBLE SWALLOWING: 1
CHEST TIGHTNESS: 0
SORE THROAT: 1
DIARRHEA: 0
PHOTOPHOBIA: 0
BACK PAIN: 0
SHORTNESS OF BREATH: 0
ALLERGIC/IMMUNOLOGIC NEGATIVE: 1
BLOOD IN STOOL: 0
NAUSEA: 0
COUGH: 0
VOICE CHANGE: 1
RESPIRATORY NEGATIVE: 1

## 2021-03-30 ASSESSMENT — PAIN SCALES - GENERAL
PAINLEVEL_OUTOF10: 0
PAINLEVEL_OUTOF10: 0

## 2021-03-30 NOTE — ADT AUTH CERT
Utilization Reviews       Ventricular Arrhythmias - Care Day 12 (3/29/2021) by Sendy Royal RN       Review Status Review Entered   Completed 3/30/2021 08:43      Criteria Review      Care Day: 12 Care Date: 3/29/2021 Level of Care: Telemetry    Guideline Day 2    Level Of Care    (X) * Intermediate care or telemetry    3/30/2021 8:43 AM EDT by Holly Garnett      telemetry    Clinical Status    (X) * Hemodynamic stability    3/30/2021 8:43 AM EDT by Holly Garnett      172/70    (X) * No evidence of myocardial ischemia    3/30/2021 8:43 AM EDT by Holly Garnett      not documented    Routes    (X) Oral hydration and diet    3/30/2021 8:43 AM EDT by Holly Garnett      aspirin ec 81mg po qd  pulmicort neb bid  coreg 25mg po bid  catapres 0.2mg po bid  heparin 5000 units sc tid  duoneb tid  merrem 1g iv q12h  solumedrol 40mg iv qd  nitrobid oint 1 in qid  protonix 40mg iv qd  calan 120mg po bid    (X) Possible IV medication    3/30/2021 8:43 AM EDT by Holly Garnett      merrem 1g iv q12h  solumedrol 40mg iv qd  protonix 40mg iv qd    Interventions    (X) Cardiac monitoring    3/30/2021 8:43 AM EDT by Holly Garnett      nsr    (X) Probable electrolytes, ECG    3/30/2021 8:43 AM EDT by Holly Garnett      bmp    Medications    (X) Possible medication for rhythm control    3/30/2021 8:43 AM EDT by Holly Garnett      calan 120mg po bid    (X) Possible medication for underlying heart disease (eg, beta-blockers, diuretics, ACE inhibitors, aspirin)    3/30/2021 8:43 AM EDT by Holly Garnett      see oral meds    * Milestone   Additional Notes   Care day 12   3/29/21      Vs   BP (!) 172/70   Pulse 73   Temp 98.4 °F (36.9 °C) (Oral)   Resp 18   Ht 4' 11\" (1.499 m)   Wt 151 lb 4.8 oz (68.6 kg)   LMP  (LMP Unknown)   SpO2 100% 3Lnc   BMI 30.56 kg/m²       Labs   Anion Gap 8 mEq/L    Glucose 249 mg/dL    BUN 40 mg/dL    CREATININE 1.30 mg/dL    GFR Non-African American 39.8   GFR  48.1   Calcium 10.0 mg/dL    Total Protein 5.9 - on Lantus, ISS       ESBL E.Coli UTI / aspiration pneumonia   - on IV meropenem x 1 more week per ID       Leukocytosis    - improved       Dysphagia    - MBS per speech therapy            Cardiology impression   Assessment/Plan:   1. CPA    2. Respiratory failure-extubated. Stable   3. Aspiration PNA - on ABX   4. Encephalopathy-resolved   5. CAD - LAD mild - will resume low dose ASA   6. HTN - still labile.  Advance Verapamil to 120 bid.  This will also help lower HR. 7. LVEF 55%   8. Echo Severe LVH. 1-2+ MR RVSP 31 mmHg. LVEF 50%   9. History of HCM - need to Keep HR close to 60s.  Continue and nitrate.  We tera change to PO form soon. 10. SAÚL - Nephrology following- stable.  She has known moderate Right LYNDON from [address] 6/30/2020 - we will address this by CTA in future as out pt. 11. Accelerated HTN- adjust meds. 12. PT OT            Neuro impression   Patient seen and examined for neurology follow-up for Hypoxic encephalopathy secondary to cardiopulmonary arrest with subsequent acute hypoxic respiratory failure requiring intubation and mechanical ventilation.  Patient extubated on 3/26/21. Currently alert and oriented x3, no acute distress, cooperative.  Patient with hypophonia secondary to hoarseness.  Does have some what appears to be oral candidiasis.  Denies headache or vision changes.  No dysphagia or dysarthria.  Patient remains nonfocal and without seizure activity. ASSESSMENT AND PLAN   Encephalopathy secondary to cardiorespiratory arrest.  Patient may have suffered some hypoxemic damage though this very difficult to certain as patient is now extubated and off sedation    and she does appear to be improving each day. Royetta Libman will keep an eye on this repeat CT scan did not anything significant as far as mass lesions or structural lesions no early CT sign changes seen to suggest any acute ischemia and no evidence of any gray-white matter changes to suggest severe hypoxic ischemic changes.  The patient does appear to be tolerating diet at this point which we will advance as tolerated.  Patient may benefit from rehabilitation stay with cognitive and speech therapy.       Hypoxic encephalopathy secondary to cardiopulmonary arrest with subsequent acute hypoxic respiratory failure requiring intubation and mechanical ventilation.  Patient extubated on 3/26/21   Acute on chronic anemia with hemoglobin of 5.8 on arrival status post transfusion   Patient remains hypertensive with history of hypertensive cardiomyopathy.  Patient with known moderate right renal artery stenosis attributing to resistant hypertension   DNR CCA, palliative care on consult   Encephalopathy has resolved.  Nonfocal.  No seizure activity reported.  Continue to follow    Mehul Hayden MD, [facility]            Pulmonary impression   IMPRESSION AND SUGGESTION:   Patient is at risk due to    · Acute hypoxic spelt failure, improving   · Volume overload, improving   · Stridor and bronchospasm, slowly improving   · ESBL UTI   · Left-sided pleural effusion, likely third spacing       Recommendations       · Repeat chest x-ray tomorrow   · Watch volume status, avoid overload   · Change to prednisone    · Antibiotics per ID   · Incentive spirometry and flutter valve   · BiPAP while asleep   · Will need sleep study as outpatient            Id impression    PLAN:   ESBL E. coli UTI    meropenem         Renal impression   Assessment:   Resolving ATN/SAÚL   Hx Cardiac arrest admission   DM rype-2-Hypertension-Hyperlipidemia       Plan: Will sign off case  Rehab possibly  Let us knoow of any change in GFR worsening  Maintain hydration

## 2021-03-30 NOTE — PROGRESS NOTES
[facility]  Facility/Department: [facility]  Speech Language Pathology   Treatment Note      Zadie Najjar  1945  [id number]    Medical Dx: Cardiac arrest [facility]) [I46.9]  Speech Dx: Dysphagia     3/30/2021    Subjective:  Alert, Cooperative and Pleasant    Pt stated \"I was cleared yesterday to eat more food\" referring to MBS completed the previous date. Pt stated that she is excited to go home so that she can eat harjeet greens and sweet potatoes. Interventions used this date:  Dysphagia Treatment    Objective/Assessment:  Patient progressing towards goals:  Short-term Goals  Timeframe for Short-term Goals: 1 week  Goal 1: Pt will complete lingual exercises that promote anterior to posterior propulsion of bolus and improve tongue base retraction with 90% accuracy in order to strengthen the muscles of the swallow to decrease risk of aspiration and to increase ability to safely handle the least restrictive diet level. Pt completed tongue press exercise, 5 sets of 5, with good effort, however pt demonstrated difficulty with tongue coordination to complete the exercise initially, but was able to do so with mod verbal cues. Goal 2: Pt will tolerate therapeutic trials of solid with demonstrating no overt s/s of difficulty or aspiration on 100% trials. Pt tolerated bites of soft solids with prolonged mastication time needed per each bite. Min-mod residuals observed throughout the oral cavity post swallow that the pt cleared independently with increased time. Goal 3: Pt will tolerate the recommended diet level with no s/s of aspiration. Pt tolerated single sips of thin liquid via cup without overt s/s of aspiration in all opportunities.    Compensatory Swallowing Strategies: Eat/Feed slowly, Upright as possible for all oral intake, Small bites/sips      Treatment/Activity Tolerance:  Patient tolerated treatment well    Plan:  Continue per POC    Pain Assessment:  Initial Assessment:  Patient denies pain. Re-assessment:  Patient denies pain. Patient/Caregiver Education:  Patient educated on session and progression towards goals. Patient stated verbal understanding of directions.     Safety Devices:  Bed alarm in place, Call light within reach and [employer]      Therapy Time  Time in: 0831  Time out: 0845  Total Minutes: 14    Signature: Electronically signed by CRISTIAN Marcos on 3/30/2021 at 10:12 AM

## 2021-03-30 NOTE — CARE COORDINATION
The LSW met with the patient's . The patient's  would like the patient to return home with palliative care and not [facility]. The LSW updated [employer], [employer]. The patient's  does not want HHC. Possible home O2 eval will be needed and the patient's  would like a script for a hospital bed. Freedom of choice for DME was discussed. The patient's  would like a DME that accepts the patient's insurance. The  and the LSW updated the hospitalist. Electronically signed by MILAN Trejo on 3/30/21 at 11:39 AM EDT    The LSW faxed the patient's information and [facility] Per Josh Verduzco, does take the patient's insurance.  Electronically signed by MILAN Trejo on 3/30/21 at 12:19 PM EDT

## 2021-03-30 NOTE — PROGRESS NOTES
Physical Therapy Med Surg Daily Treatment Note  Facility/Department: [address] [facility]  Room: [facility]94[facility]       NAME: Jessica Ledbetter  : 1945 (23 y.o.)  MRN: [id number]  CODE STATUS: Limited    Date of Service: 3/30/2021    Patient Diagnosis(es): Cardiac arrest [facility]) [I46.9]   Chief Complaint   Patient presents with    Respiratory Distress     Patient Active Problem List    Diagnosis Date Noted    Cardiopulmonary arrest [facility])      Priority: High    Lumbosacral radiculopathy at L5 2015     Priority: High     Class: Acute    Spinal stenosis of lumbar region with neurogenic claudication 2015     Priority: High     Class: Chronic    High risk medication use-Norco - 17 OARRS PM&R, 18 OARRS PM&R, 18 OARRS PM&R, Urine Drug screen negative 17 PM&R--MED CONTRACT 2014     Priority: High    Goals of care, counseling/discussion     Bradycardia     Moderate hypoxic-ischemic encephalopathy     Acute respiratory failure with hypoxia (HCC)     Cardiac arrest ([address] 2021    Gait abnormality 10/14/2020    Late effects of CVA (cerebrovascular accident) 10/14/2020    Sepsis ([address] 10/06/2020    Major depressive disorder in remission ([address] 10/06/2020    Gastroesophageal reflux disease without esophagitis 10/06/2020    Acute cystitis without hematuria 10/06/2020    CKD (chronic kidney disease) stage 4, GFR 15-29 ml/min ([address] 10/06/2020    Adenomatous polyp of sigmoid colon     Adenomatous polyp of transverse colon     Encephalopathy acute     Flash pulmonary edema ([address] 2020    Acute on chronic kidney failure ([address] 2020    Dysarthria     Chronic fatigue     Symptomatic anemia 2020    COPD exacerbation ([address] 2020    Anemia     AVM (arteriovenous malformation)     Gastrointestinal hemorrhage 2020    HOCM (hypertrophic obstructive cardiomyopathy) ([address] 2019    Hypoglycemia     SAÚL (acute kidney injury) ([address] 10/23/2019    Acute on chronic diastolic (congestive) heart failure (HCC) 10/13/2019    Chronic obstructive pulmonary disease with acute exacerbation ([facility] 10/12/2019    Hypertensive urgency 10/09/2019    Uncontrolled type 2 diabetes mellitus with hyperglycemia ([facility])     Acute on chronic diastolic heart failure ([address] 10/08/2019    CHF (congestive heart failure) ([facility])     PMB (postmenopausal bleeding)     Acute combined systolic and diastolic CHF, NYHA class 1 ([facility] 03/24/2019    Osteoarthritis of spine with radiculopathy, lumbar region 11/07/2018    Myalgia 05/11/2018    Intercostal neuropathy 05/11/2018    Diabetic asymmetric polyneuropathy ([facility] 04/11/2017    Cervical radicular pain 12/03/2016    Reactive depression 07/15/2016    Diabetic radiculopathy ([facility] 07/15/2016    Insomnia secondary to chronic pain 04/15/2016    Non-compliant patient NO showed FU 1/10/17 Dr Nirmala Evans 09/24/2015    Impaired mobility and activities of daily living 03/28/2015    Neck pain 03/04/2015    Artificial lens present 10/03/2014    Presbyopia 10/03/2014    Astigmatism, regular 10/03/2014    Cataract, nuclear sclerotic senile 10/03/2014    IDDM (insulin dependent diabetes mellitus) 10/03/2014    Regular astigmatism 10/03/2014    Nuclear senile cataract 10/03/2014    Memory deficit 06/04/2014    SOB (shortness of breath) on exertion 03/14/2014    Chest pain 03/14/2014    CTS (carpal tunnel syndrome) 02/09/2014    DJD (degenerative joint disease), lumbar 02/08/2014    Lumbosacral radiculopathy at S1 02/08/2014    DDD (degenerative disc disease), lumbar 02/08/2014    Dysphonia 02/08/2014    Insomnia 12/04/2013    Smoker 06/18/2013    Hypertension     Hyperlipidemia     Uncontrolled type 2 diabetes mellitus with complication, without long-term current use of insulin ([facility])     Fibromyalgia     Anxiety         Past Medical History:   Diagnosis Date    Anxiety     Asthma     dx 2019 / has smoked since age 11    CHF (congestive heart failure) (HCC)     Chronic back pain     Bilateral L5 S1 Radic on emg--surprisingly worse on the left than the right--pt's symptoms and her MRI show worse on the right    Chronic obstructive pulmonary disease with acute exacerbation ([facility]) 10/12/2019    Depression     Fibromyalgia     Hyperlipidemia     meds > 8 yrs    Hypertension     meds > 45 yrs    On home O2     2l per n/c at bedtime mostly,     Osteoarthritis     Type II diabetes mellitus, uncontrolled (HCC)     hx > 8 yrs    Unspecified sleep apnea      Past Surgical History:   Procedure Laterality Date    BACK SURGERY  2017    lumbar disc    CARDIAC CATHETERIZATION  11/3/14     DR. MIRELES / no stents    COLONOSCOPY  08/29/2016    w/polypectomy     COLONOSCOPY N/A 9/29/2020    COLONOSCOPY WITH POLYPECTOMY performed by Adriana Riley MD at [facility] N/A 10/7/2019    EUA HYSTEROSCOPY DILATATION AND CURETTAGE performed by Pipo Griffin DO at [facility]. [address], COLON, DIAGNOSTIC      EYE SURGERY      [place] with IOL OU / [id number]    umbilical hernia repair    PA ESOPHAGOGASTRODUODENOSCOPY TRANSORAL DIAGNOSTIC N/A 3/24/2017    EGD ESOPHAGOGASTRODUODENOSCOPY performed by Ronny Noriega MD at [place] [address] 2/8/2018    negative findings    PA REVISE MEDIAN N/CARPAL TUNNEL SURG Left 6/5/2017    LEFT  CARPAL TUNNEL RELEASE performed by Alcon Orona MD at [facility],2+I/K,QMSRT N/A 2/8/2018    TONSILLECTOMY      as child    UPPER GASTROINTESTINAL ENDOSCOPY  08/26/2016    w/bx     UPPER GASTROINTESTINAL ENDOSCOPY N/A 2/25/2020    EGD possible biopsy performed by Adriana Riley MD at [address] [place] [address] 7/1/2020    EGD PUSH ENTEROSCOPY performed by Chelly Estrada MD at [facility] Restrictions  Restrictions/Precautions: Contact Precautions; Fall Risk(ESBL in urine)    SUBJECTIVE   General  Chart Reviewed: Yes  Family / Caregiver Present: Yes  Subjective  Subjective: \"I will try. \"    Pre-Session Pain Report  Pre Treatment Pain Screening  Pain at present: 0  Intervention List: Patient able to continue with treatment  Pain Screening  Patient Currently in Pain: Denies       Post-Session Pain Report  Pain Assessment  Pain Level: 0         OBJECTIVE     Bed mobility  Bridging: Minimal assistance  Rolling to Left: Maximum assistance  Supine to Sit: Maximum assistance  Sit to Supine: Maximum assistance  Scooting: Maximal assistance  Comment: Pt initiating bed mobility this session, assist needed with BLE and trunk during both supine <> sit    Transfers  Sit to Stand: Moderate Assistance;2 Person Assistance  Stand to sit: Moderate Assistance;2 Person Assistance  Comment: VC's for safe hand placement, poor carryover on second attempt. Pt able to maintain standing ~ 15 seconds x2    Neuromuscular Education  Neuromuscular Comments: Static sitting balance on EOB, pt able to maintain midline utilizing BUE support. no LOB. Exercises  Quad Sets: x2  Knee Long Arc Quad: x10  Ankle Pumps: x10         ASSESSMENT   Assessment: Pt displays an improvement in strength and endurance secondary to ability to perform STS this session. VC's for safe hand placement and sequencing needed throughout both bed mobility and trsfs with fair follow through.      Discharge Recommendations:  Continue to assess pending progress, Patient would benefit from continued therapy after discharge    Goals  Long term goals  Long term goal 1: Bed mobility with Mod A  Long term goal 2: unsupported sitting with Min A x 8 min  Long term goal 3: functional reach in sitting <4 inch OOBOS  Long term goal 4: progress to functional transfers with 2 person Mod A  Long term goal 5: to be assessed for gait when appropriate  Patient Goals Patient goals : \"I want to get better\"    PLAN    Times per week: 3-6  Plan Comment: Cont. POC  Safety Devices  Type of devices: All fall risk precautions in place, Bed alarm in place, Call light within reach, Left in bed     [facility]  AM-PAC Inpatient Mobility Raw Score : 9     Therapy Time   Individual   Time In 1020   Time Out 1034   Minutes 14      BM/Trsf: 8  There ex: 2  NMR: [address], PTA, 03/30/21 at 10:41 AM         Definitions for assistance levels  Independent = pt does not require any physical supervision or assistance from another person for activity completion. Device may be needed.   Stand by assistance = pt requires verbal cues or instructions from another person, close to but not touching, to perform the activity  Minimal assistance= pt performs 75% or more of the activity; assistance is required to complete the activity  Moderate assistance= pt performs 50% of the activity; assistance is required to complete the activity  Maximal assistance = pt performs 25% of the activity; assistance is required to complete the activity  Dependent = pt requires total physical assistance to accomplish the task

## 2021-03-30 NOTE — PROGRESS NOTES
Palliative Care Progress Note  Patient: Jagruti Townsend  Gender: female  YOB: 1945  Unit/Bed: M412/V012-77  CodeStatus: Limited  Inpatient Treatment Team: Treatment Team: Attending Provider: Dolly Onofre MD; Consulting Physician: Teddy Mccarty MD; Consulting Physician: Dre Becerra DO; Consulting Physician: Malvin Guevara MD; Utilization Reviewer: Yenny Mckeon, RN; Consulting Physician: Martin Houser MD; Utilization Reviewer: Chet Tillman, RN; Registered Nurse: Amilcar Corral, AKASH; : Matt Cancino, AKASH; Patient Care Tech: Tash Hoffmann; : Sherryle Martini, LSW  Admit Date:  3/18/2021    Chief Complaint: fatigue    History of Presenting Illness:      Jagruti Townsend is a 67 y.o. female on hospital day 15 with a history of  Cardiac arrest. PMH is significant for nonobstructive CAD, diastolic HF, HOCM, HTN, IDDM2, CKD3, anemia, prolonged hospitalization in 06/4243 complicated by cardiac arrest s/p ROSC,requiring transvenous pacing due to persistent bradycardia and hypotension despite pressor support, SAÚL, and septic shock. Patient seen and examined at bedside for goals of care discussion, code status discussion, family support, and symptom management. She presented to the ER via EMS after becoming unresponsive at home after complaining of feeling weak and tired and taking an extra torsemide. Upon arrival to the ER patient was bradycardic and hypotensive with agonal respirations. She was intubated in the ER. She was treated with dopamine and Levophed for her persistent bradycardia. She went into cardiac arrest. CPR was initiated and lasted for 16 minutes per report. Patient treated with one unit of PRBC for anemia and positive FOBT. S/P extubation on 3/26/21. She is now on the medical floor. She is scheduled to have MBS done today. Patient and her  met with hospice yesterday to gather information.  No decision has been made at this time for patient to be admitted to hospice care. Patient observed laying in bed. She is alert and oriented x3. Her speech is barely audible secondary to hoarseness. She appears to have oral candidiasis. Will start her on nystatin swish and swallow. Reports that she is just \"very tired\". Denies pain or discomfort. No GI or  complaints voiced. Tells me that she lives at home with her . Gives me permission to discuss her care with her . 3/30/21- Patient observed laying in bed. She is alert and oriented x3. Her speech volume is improving. She is still reporting hoarseness. Reports that she is just \"very tired\". Denies pain or discomfort. No GI or  complaints voiced. She is on contact precaution for UTI with ESBL. Patient's  is at bedside. We discussed Pall care versus hospice care. Stated that he would like for her to return home with home care and palliative care service. Review of Systems:       Review of Systems   Constitutional: Positive for activity change, appetite change and fatigue. Negative for unexpected weight change. HENT: Positive for trouble swallowing and voice change. Eyes: Negative. Respiratory: Negative for shortness of breath and wheezing. Cardiovascular: Negative for leg swelling. Gastrointestinal: Negative for constipation, diarrhea and nausea. Endocrine: Negative. Genitourinary: Negative. Musculoskeletal: Positive for gait problem. Skin: Negative for pallor. Allergic/Immunologic: Negative. Neurological: Positive for weakness. Negative for dizziness. Psychiatric/Behavioral: Negative for confusion. Physical Examination:       BP (!) 158/57   Pulse 81   Temp 98.1 °F (36.7 °C) (Oral)   Resp 20   Ht 4' 11\" (1.499 m)   Wt 151 lb 4.8 oz (68.6 kg)   LMP  (LMP Unknown)   SpO2 99%   BMI 30.56 kg/m²    Physical Exam  Constitutional:       General: She is not in acute distress. Appearance: She is well-developed. She is obese. She is ill-appearing. Interventions: Nasal cannula in place. HENT:      Head: Normocephalic. Nose: Congestion present. Eyes:      General: No scleral icterus. Right eye: No discharge. Left eye: No discharge. Neck:      Musculoskeletal: Neck supple. Cardiovascular:      Rate and Rhythm: Normal rate. Pulmonary:      Effort: No respiratory distress. Breath sounds: Decreased breath sounds present. No wheezing. Abdominal:      Palpations: Abdomen is soft. Musculoskeletal:         General: No swelling. Skin:     General: Skin is warm and dry. Neurological:      Mental Status: She is alert. Motor: Weakness present. Allergies:       Allergies   Allergen Reactions    Ibuprofen Nausea Only    Metformin And Related      Diarrhea      Darvon [Propoxyphene Hcl] Nausea And Vomiting       Medications:      Current Facility-Administered Medications   Medication Dose Route Frequency Provider Last Rate Last Admin    carvedilol (COREG) tablet 37.5 mg  37.5 mg Oral BID Elsa Libman, MD        verapamil (CALAN) tablet 120 mg  120 mg Oral BID Elsa Libman, MD   120 mg at 03/30/21 0817    nystatin (MYCOSTATIN) 530896 UNIT/ML suspension 500,000 Units  5 mL Oral 4x Daily Amber Marie MD   500,000 Units at 03/30/21 0817    predniSONE (DELTASONE) tablet 40 mg  40 mg Oral Daily Eli Denny MD   40 mg at 03/30/21 0816    aspirin EC tablet 81 mg  81 mg Oral Daily Elsa Libman, MD   81 mg at 03/30/21 0817    ipratropium-albuterol (DUONEB) nebulizer solution 1 ampule  1 ampule Inhalation TID [facility] DO   1 ampule at 03/30/21 0726    albuterol (PROVENTIL) nebulizer solution 2.5 mg  2.5 mg Nebulization Q2H PRN [facility] DO        insulin glargine (LANTUS) injection vial 20 Units  20 Units Subcutaneous Nightly Shagufta Farrar MD   20 Units at 03/29/21 2130    insulin lispro (HUMALOG) injection vial 0-12 Units  0-12 Units Subcutaneous TID WC Shagufta Farrar MD   2 Units at 03/30/21 7405    insulin lispro (HUMALOG) injection vial 0-6 Units  0-6 Units Subcutaneous Nightly Andree Gonzalez MD   3 Units at 03/29/21 2130    sodium chloride flush 0.9 % injection 10 mL  10 mL Intravenous 2 times per day Jonah Sewell MD   10 mL at 03/30/21 0816    sodium chloride flush 0.9 % injection 10 mL  10 mL Intravenous PRN Jonah Sewell MD        cloNIDine (CATAPRES) tablet 0.2 mg  0.2 mg Oral BID Jonah Sewell MD   0.2 mg at 03/30/21 0817    glucose (GLUTOSE) 40 % oral gel 15 g  15 g Oral PRN Jonah Sewell MD        dextrose 50 % IV solution  12.5 g Intravenous PRN Jonah Sewell MD        glucagon (rDNA) injection 1 mg  1 mg Intramuscular PRN Jonah Sewell MD        dextrose 5 % solution  100 mL/hr Intravenous PRN Jonah Sewell MD        meropenem (MERREM) 1,000 mg in sodium chloride 0.9 % 100 mL IVPB (mini-bag)  1,000 mg Intravenous Q12H Hetal Giron MD   Stopped at 03/30/21 0845    0.9 % sodium chloride infusion   Intravenous PRN Dallie Goodell, DO        nitroglycerin (NITRO-BID) 2 % ointment 1 inch  1 inch Topical 4 times per day Amy Chanel MD   1 inch at 03/30/21 0500    pantoprazole (PROTONIX) injection 40 mg  40 mg Intravenous Daily Jonah Sewell MD   40 mg at 03/30/21 6201    And    sodium chloride (PF) 0.9 % injection 10 mL  10 mL Intravenous Daily Jonah Sewell MD   10 mL at 03/30/21 0812    heparin (porcine) injection 5,000 Units  5,000 Units Subcutaneous 3 times per day Jonah Sewell MD   5,000 Units at 03/30/21 0500    docusate (COLACE) 50 MG/5ML liquid 100 mg  100 mg Oral BID Jonah Sewell MD   100 mg at 03/29/21 2118    polyethylene glycol (GLYCOLAX) packet 17 g  17 g Oral Daily Jonha Sewell MD   Stopped at 03/27/21 0803    budesonide (PULMICORT) nebulizer suspension 500 mcg  0.5 mg Nebulization BID Jonah Sewell MD   500 mcg at 03/30/21 0723    hydrALAZINE (APRESOLINE) injection 10 mg  10 mg Intravenous Q4H PRN Beni D Sedar, DO   10 mg at 03/26/21 2321    Or    hydrALAZINE (APRESOLINE) injection 20 mg  20 mg Intravenous Q4H PRN Elyssa Bias Sedar, DO   20 mg at 03/25/21 2051    labetalol (NORMODYNE;TRANDATE) injection 20 mg  20 mg Intravenous Q4H PRN Ally Reveal, DO   20 mg at 03/24/21 1227    labetalol (NORMODYNE;TRANDATE) injection 40 mg  40 mg Intravenous Q4H PRN Ally Reveal, DO   40 mg at 03/27/21 1302    promethazine (PHENERGAN) tablet 12.5 mg  12.5 mg Oral Q6H PRN Josué Morales MD        Or    ondansetron (ZOFRAN) injection 4 mg  4 mg Intravenous Q6H PRN Josué Morales MD        chlorhexidine (PERIDEX) 0.12 % solution 15 mL  15 mL Mouth/Throat BID Josué Morales MD   15 mL at 03/29/21 2118    sodium chloride flush 0.9 % injection 10 mL  10 mL Intravenous 2 times per day Josué Morales MD   10 mL at 03/30/21 0819    sodium chloride flush 0.9 % injection 10 mL  10 mL Intravenous PRN Josué Morales MD        acetaminophen (TYLENOL) tablet 650 mg  650 mg Oral Q6H PRN Josué Morales MD   650 mg at 03/25/21 2153    Or    acetaminophen (TYLENOL) suppository 650 mg  650 mg Rectal Q6H PRN Josué Morales MD   650 mg at 03/25/21 0153       History: PMHx:  Past Medical History:   Diagnosis Date    Anxiety     Asthma     dx 2019 / has smoked since age 14    CHF (congestive heart failure) (HCC)     Chronic back pain     Bilateral L5 S1 Radic on emg--surprisingly worse on the left than the right--pt's symptoms and her MRI show worse on the right    Chronic obstructive pulmonary disease with acute exacerbation ([facility] [address] 10/12/2019    Depression     Fibromyalgia     Hyperlipidemia     meds > 8 yrs    Hypertension     meds > 45 yrs    On home O2     2l per n/c at bedtime mostly,     Osteoarthritis     Type II diabetes mellitus, uncontrolled (HCC)     hx > 8 yrs    Unspecified sleep apnea        PSHx:  Past Surgical History:   Procedure Laterality Date    BACK SURGERY  2017    lumbar disc    CARDIAC CATHETERIZATION  11/3/14      [facility] / no stents    COLONOSCOPY  08/29/2016    w/polypectomy     COLONOSCOPY N/A 9/29/2020    COLONOSCOPY WITH POLYPECTOMY performed by Miles Graff MD at [facility] N/A 10/7/2019    EUA HYSTEROSCOPY DILATATION AND CURETTAGE performed by Osvaldo Lopez DO at [facility], COLON, DIAGNOSTIC      EYE SURGERY      [place] with IOL OU / [address] [place]  [address]    umbilical hernia repair    IA ESOPHAGOGASTRODUODENOSCOPY TRANSORAL DIAGNOSTIC N/A 3/24/2017    EGD ESOPHAGOGASTRODUODENOSCOPY performed by Poly Meza MD at [place] [address] 2/8/2018    negative findings    IA REVISE MEDIAN N/CARPAL TUNNEL SURG Left 6/5/2017    LEFT  CARPAL TUNNEL RELEASE performed by Jozef Philip MD at [facility],3+Q/I,EVHJV N/A 2/8/2018    TONSILLECTOMY      as child    UPPER GASTROINTESTINAL ENDOSCOPY  08/26/2016    w/bx     UPPER GASTROINTESTINAL ENDOSCOPY N/A 2/25/2020    EGD possible biopsy performed by Miles Graff MD at [facility] N/A 7/1/2020    EGD PUSH ENTEROSCOPY performed by Lokesh Joyce MD at [address] Hx:  Social History     Socioeconomic History    Marital status:      Spouse name: Varsha Pradhan Number of children: 2    Years of education: 15    Highest education level: High school graduate   Occupational History    Occupation: Retired-   Social Needs    Financial resource strain: Not hard at all   [place] insecurity     Worry: Never true     Inability: Never true    Transportation needs     Medical: No     Non-medical: No   Tobacco Use    Smoking status: Former Smoker     Packs/day: 1.00     Years: 59.00     Pack years: 59.00     Types: Cigarettes     Start date: 11/30/2017    Smokeless tobacco: Never Used    Tobacco comment: quit 2-3 weeks ago    Substance and Sexual Activity    Alcohol use: Not Currently    Drug use: No    Sexual activity: Yes     Partners: Male   Lifestyle    Physical activity     Days per week: 0 days     Minutes per session: 0 min    Stress:  Only a little   Relationships    Social connections     Talks on phone: More than three times a week     Gets together: More than three times a week     Attends Yarsanism service: 1 to 4 times per year     Active member of club or organization: No     Attends meetings of clubs or organizations: Never     Relationship status: Living with partner    Intimate partner violence     Fear of current or ex partner: No     Emotionally abused: No     Physically abused: No     Forced sexual activity: No   Other Topics Concern    None   Social History Narrative    Grew up in [place]     Lives With:  Chris Haywood Spouse    Type of Home: House    Home Layout: Two level, Performs ADL's on one level    Home Access: Stairs to enter with rails    Entrance Stairs - Number of Steps: 4    Bathroom Shower/Tub: Tub/Shower unit, Doors    Bathroom Equipment: Shower chair, Grab bars in [place]: Rolling walker, Cane, Oxygen(uses her O2 very seldom)    ADL Assistance: Needs assistance    Homemaking Assistance: Needs assistance    Homemaking Responsibilities: No(spouse performs)    Ambulation Assistance: Independent    Transfer Assistance: Independent    Active : No    Occupation: Retired    Type of occupation: worked in [employer]: patient enjoys [employer]       Family Hx:  Family History   Problem Relation Age of Onset    Heart Disease Father         cardiac bypass    Arthritis Father     Arthritis Mother     Other Mother          at age 80    Other Sister         nuknown health hx    No Known Problems Daughter     Stroke Son        LABS: Reviewed     CBC:  Lab Results   Component Value Date    WBC 14.2 2021    RBC 3.17 2021    HGB 9.2 2021    HCT 28.4 2021    MCV 89.5 03/30/2021    MCH 29.1 03/30/2021    MCHC 32.5 03/30/2021    RDW 15.9 03/30/2021     03/30/2021    MPV 8.8 08/01/2015     CBC with Differential:   Lab Results   Component Value Date    WBC 14.2 03/30/2021    RBC 3.17 03/30/2021    HGB 9.2 03/30/2021    HCT 28.4 03/30/2021     03/30/2021    MCV 89.5 03/30/2021    MCH 29.1 03/30/2021    MCHC 32.5 03/30/2021    RDW 15.9 03/30/2021    NRBC 1 08/28/2020    LYMPHOPCT 8.6 03/30/2021    MONOPCT 7.3 03/30/2021    BASOPCT 0.4 03/30/2021    MONOSABS 1.0 03/30/2021    LYMPHSABS 1.2 03/30/2021    EOSABS 0.0 03/30/2021    BASOSABS 0.1 03/30/2021     CMP:    Lab Results   Component Value Date     03/30/2021    K 4.7 03/30/2021    K 4.4 10/19/2020     03/30/2021    CO2 26 03/30/2021    BUN 39 03/30/2021    CREATININE 1.25 03/30/2021    GFRAA 50.3 03/30/2021    LABGLOM 41.6 03/30/2021    GLUCOSE 185 03/30/2021    PROT 5.9 03/29/2021    LABALBU 2.9 03/30/2021    CALCIUM 10.3 03/30/2021    BILITOT <0.2 03/29/2021    ALKPHOS 99 03/29/2021    AST 21 03/29/2021    ALT 28 03/29/2021     BMP:    Lab Results   Component Value Date     03/30/2021    K 4.7 03/30/2021    K 4.4 10/19/2020     03/30/2021    CO2 26 03/30/2021    BUN 39 03/30/2021    LABALBU 2.9 03/30/2021    CREATININE 1.25 03/30/2021    CALCIUM 10.3 03/30/2021    GFRAA 50.3 03/30/2021    LABGLOM 41.6 03/30/2021    GLUCOSE 185 03/30/2021     TSH:   Lab Results   Component Value Date    TSH 0.474 11/30/2020     Vitamin B12 and Folate: No components found for: FOLIC,  M76  Urinalysis:   Lab Results   Component Value Date    NITRU Negative 10/18/2020    WBCUA 6-9 10/18/2020    BACTERIA Negative 10/18/2020    RBCUA 3-5 10/18/2020    BLOODU SMALL 10/18/2020    SPECGRAV 1.009 10/18/2020    GLUCOSEU Negative 10/18/2020           FUNCTIONAL ADL´S:     Independent: [ x ] Eating, [ x ] Dressing, [x ] Transferring, [ x ] Toileting, [ x ] Bathing, [ x ] Continence  Dependent   : [  ] Eating, [   ] Dressing, [   ] Transferring, [   ] [place], [   ] Bathing, [   ] Continence  W. assistant : [  ] Eating, [   ] Dressing, [   ] Transferring, [   ] Toileting, [   ] Bathing, [   ] Continence    Radiology: Reviewed      Echocardiogram Complete 2d With Doppler With Color     Result Date: 3/19/2021  Transthoracic Echocardiography Report (TTE)  Demographics  Patient Name   Jeanette Tim  Gender              Female                 M  Patient Number [id number]        Race                Black                                 Ethnicity  Visit Number   [id number]       Room Number         IC05  Corporate ID                   Date of Study       03/19/2021  Accession      [id number]      Referring Physician  Number  Date of Birth  1945      Sonographer         Laura Mckinney LPN  Age            67 year(s)      Interpreting        [facility]) Cardiology                                 Physician           Hellen Ochoa MD Procedure Type of Study  TTE procedure:ECHO COMPLETE 2D W/DOP W/COLOR. Procedure Date Date: 03/19/2021 Start: 09:15 AM Study Location: Portable Technical Quality: Good visualization Indications:Cardiac arrest. Patient Status: Routine Weight: 143 pounds BP: 89/48 mmHg Allergies   - Other allergy:(Darvon). Conclusions  Summary  Mitral annular calcification is present. 1-2+ MR  Normal tricuspid valve structure and function. 1+ TR  RVSP 31 mmHg  severe CLVH  LVEF 50%  E/A flow reversal noted. Suggestive of diastolic dysfunction. Signature  ----------------------------------------------------------------  Electronically signed by Hellen Ochoa MD(Interpreting  physician) on 03/19/2021 02:13 PM  ----------------------------------------------------------------  Findings Left Ventricle severe CLVH LVEF 50% E/A flow reversal noted. Suggestive of diastolic dysfunction. Right Ventricle Normal right ventricle structure and function.  Normal right ventricle systolic pressure. Left Atrium Moderately dilated left atrium. Right Atrium Normal right atrium. Mitral Valve Mitral annular calcification is present. 1-2+ MR Tricuspid Valve Normal tricuspid valve structure and function. 1+ TR RVSP 31 mmHg Aortic Valve Normal aortic valve structure and function. Pulmonic Valve The pulmonic valve was not well visualized . Pericardial Effusion No evidence of pericardial effusion. Pleural Effusion No evidence of pleural effusion. Aorta \ Miscellaneous The aorta is within normal limits. M-Mode Measurements (cm)  LVIDd: 4.01 cm                         LVIDs: 1.85 cm  IVSd: 1.45 cm                          IVSs: 1.23 cm  LVPWd: 1.32 cm                         LVPWs: 1.61 cm  Rt. Vent.  Dimension: 1.07 cm           AO Root Dimension: 2.8 cm                                         ACS: 1.47 cm                                         LA: 3.04 cm                                         LVOT: 1.98 cm Doppler Measurements:  AV Velocity:0.01 m/s                   MV Peak E-Wave: 0.96 m/s  AV Peak Gradient: 7.73 mmHg            MV Peak A-Wave: 1.58 m/s  AV Mean Gradient: 3.01 mmHg  AV Area (Continuity):1.27 cm^2         MV P1/2t: 105.6 msec  TR Velocity:2.29 m/s                   MVA by PHT2.08 cm^2  TR Gradient:21.01 mmHg                 Estimated RAP:10 mmHg                                         RVSP:31 mmHg Valves  Mitral Valve  Peak E-Wave: 0.96 m/s          Peak A-Wave: 1.58 m/s  P1/2t: 105.6 msec              E/A Ratio: 0.61                                 Peak Gradient: 3.7 mmHg  Area (PHT): 2.08 cm^2          Deceleration Time: 349.6 msec  MR Velocity: 4.66 m/s  Aortic Valve  Peak Velocity: 1.39 m/s                Mean Velocity: 0.79 m/s  Peak Gradient: 7.73 mmHg               Mean Gradient: 3.01 mmHg  Area (continuity): 1.27 cm^2  AV VTI: 28.28 cm  Cusp Separation: 1.47 cm  Tricuspid Valve  Estimated RVSP: 31 mmHg              Estimated RAP: 10 mmHg  TR Velocity: 2.29 m/s TR Gradient: 21.01 mmHg  Pulmonic Valve  Peak Velocity: 0.68 m/s           Peak Gradient: 1.86 mmHg                                    Estimated PASP: 31.01 mmHg  LVOT  Peak Velocity: 0.72 m/s              Mean Velocity: 0.41 m/s  Peak Gradient: 2 mmHg                Mean Gradient: 0.9 mmHg  LVOT Diameter: 1.98 cm               LVOT VTI: 11.64 cm Structures  Left Atrium  LA Dimension: 3.04 cm                 LA Area: 18.08 cm^2  LA/Aorta: 1.09  LA Volume/Index: 49.97 ml  Left Ventricle  Diastolic Dimension: 9.50 cm         Systolic Dimension: 5.19 cm  Septum Diastolic: 9.89 cm            Septum Systolic: 1.73 cm  PW Diastolic: 4.53 cm                PW Systolic: 9.28 cm                                       FS: 53.9 %  LV EDV/LV EDV Index: 70.29 ml        LV ESV/LV ESV Index: 10.47 ml  EF Calculated: 85.1 %  LVOT Diameter: 1.98 cm  Right Ventricle  Diastolic Dimension: 0.10 cm                                    RV Systolic Pressure: 95.62 mmHg Aorta/ Miscellaneous Aorta  Aortic Root: 2.8 cm  LVOT Diameter: 1.98 cm     Xr Chest Portable     Result Date: 3/19/2021  EXAMINATION: XR CHEST PORTABLE CLINICAL HISTORY: INTUBATION COMPARISONS: OCTOBER 9, 2020 FINDINGS: Endotracheal tube has migrated 3 cm inferiorly, just lying cephalad to mojgan. Nasogastric tube courses beneath diaphragm. Osseous structures intact. Cardiopericardial silhouette normal. Aorta calcified. Ill-defined area increased opacity right  lung base.      RIGHT LOWER LUNG SUBSEGMENTAL ATELECTASIS/PNEUMONIA. INTERVAL INFERIOR MIGRATION ENDOTRACHEAL TUBE 3 CM WITH TIP NOW JUST PROXIMAL TO MOJGAN. MAY WISH TO RETRACT ENDOTRACHEAL TUBE 2 CM.    Xr Chest Portable     Result Date: 3/19/2021  EXAMINATION: XR CHEST PORTABLE CLINICAL HISTORY: RESPIRATORY FAILURE COMPARISONS: MARCH 16, 2021 FINDINGS: Endotracheal tube tip 2.3 cm superior to mojgan. Nasogastric tube courses beneath diaphragm area osseous structures intact.  Ill-defined areas increase opacity at lung bases. Cardiopericardial silhouette normal.      BIBASILAR SUBSEGMENTAL ATELECTASIS/PNEUMONIA IN THIS PARTIALLY EXPIRATORY CHEST RADIOGRAPH. Assessment and plan:    -Advance Care Planning  Discussed goals of care with patient. Explained in extensive detail nuances between full code, DNR CCA and DNR CC. Patient has made the decision to be Limited code previously      -Goals of Care Discussion:  Disease process and goals of treatment were discussed in basic terms. We discussed the palliative care philosophy in light of those goals. We discussed all care options contingent on treatment response and QOL. Much active listening, presence, and emotional support were given. -Aimee's goal is to optimize available comfort care measures to decrease hospitalization and prevent ER visits when possible, manage symptomology with future Palliative Care service. Palliative Care Encounter  -Met with patient for [address], ACP, and initial discussion on pall care philosophy.   - Vaughn Carlton is 67years old with multiple comorbidities which includes nonobstructive CAD, diastolic HF, HOCM, HTN, IDDM2, CKD3, anemia, prolonged hospitalization in 06/3107 complicated by cardiac arrest s/p ROSC,requiring transvenous pacing due to persistent bradycardia and hypotension despite pressor support, SAÚL, and septic shock.  -Considering her comorbidities and her risk for re-hospitalization, Vaughn Carlton is appropriate for Palliative Care Services.  -Call for any questions, concerns or change in condition. Much support, guidance and active listening provided.  -Prognostication cannot be determined at this time. Appropriate prognostication should be available once patient has completed rehab and reach recovery. 3/30/21- Met with patient and  at bedside. Explained the difference between [facility] and hospice care. More than 35 minutes spent discussion goals of care with patient and her .   They have both decided to have palliative care service as outpatient. -PPS today is 30    Patient is currently being treated for multiple medical conditions includin. Cardiac arrest s/p ROSC  2. Acute hypoxic respiratory failure  3. Lactic acidosis  4. SAÚL/CKD3  5. Oral candidiasis  6. UTI with ESBL    Palliative care will continue to follow as outpatient. Thank you for the opportunity to participate in Aimee's care.     Electronically signed by JOHNATHON Cody CNP on 3/30/2021 at 11:15 AM

## 2021-03-30 NOTE — PROGRESS NOTES
Comprehensive Nutrition Assessment    Type and Reason for Visit:  Reassess    Nutrition Recommendations/Plan:   Continue Diet Dysphagia Soft and Bite Sizedwith diabetic ONS (Chocolate) at breakfast and dinner    Nutrition Assessment:  Pt improving from a nutritin standpoint with improvement in po intake and diet upgrade to Dysphagia soft and bite sized with thin liquids. Pt indicated she was drinking her glucerna and prefers chocolate. Will continue to provide ONS's until intake > 75% of meals. Malnutrition Assessment:  Malnutrition Status: At risk for malnutrition (Comment)    Context:  Acute Illness     Findings of the 6 clinical characteristics of malnutrition:  Energy Intake:  7 - 50% or less of estimated energy requirements for 5 or more days  Weight Loss:  No significant weight loss     Body Fat Loss:  No significant body fat loss     Muscle Mass Loss:  No significant muscle mass loss    Fluid Accumulation:  No significant fluid accumulation     Strength:  Not Performed    Estimated Daily Nutrient Needs:  Energy (kcal):  4113-0228 kcals @ 22-25 kcal/kg;  Weight Used for Energy Requirements:  Admission(65 kg)     Protein (g):  ~56 g protien @ 1.3 g/kg IBW; Weight Used for Protein Requirements:  Ideal(43 kg)        Fluid (ml/day):  ~8758-2981 ml @ 25-28 ml/kg IBW; Method Used for Fluid Requirements:  ml/Kg(43 kg)      Nutrition Related Findings:  intubated 3/18-26; familys wishes no re-intubation per rounds,labs reviewed,  Trace-1+ generalized edema present, No GI concnerns,  Gluc 185, BM (3/28), colace BID, PICC line, BSE (3/27), MBS (3/29) po intake improving      Wounds:  None       Current Nutrition Therapies:    Dietary Nutrition Supplements: Diabetic Oral Supplement (%)  DIET CARB CONTROL; Carb Control: 3 carb choices (45 gms)/meal; Dysphagia Soft and Bite-Sized (26-50%)    Anthropometric Measures:  · Height: 4' 11\" (149.9 cm)  · Current Body Weight: 151 lb (68.5 kg)(3/29)   · Admission

## 2021-03-30 NOTE — PROGRESS NOTES
Infectious Disease     Patient Name: Jagruti Townsend  Date: 3/30/2021  YOB: 1945  Medical Record Number: [id number]        ESBL E. coli UTI       Organism Abnormal  03/23/2021 10:06 PM [facility]   Escherichia coli ESBL    Urine Culture, Routine Abnormal  03/23/2021 10:06 PM  - [facility]   >100,000 CFU/ml   CONTACT PRECAUTIONS INDICATED   Carbapenem antibiotics are the best option for infections caused   by ESBL producing organisms.  Other antibiotic classes are   likely to result in treatment failure, even for organisms   demonstrating in vitro susceptibility. Testing Performed By    Jose Sandoval Name Director Address Valid Date Range   343- - 200 [facility] Jewel Francisco MD [address] [place], [place] [id number] 07/12/18 0959-Present   Narrative  Performed by: [facility]  ORDER#: [id number]                          ORDERED BY: Aureliano Goins   SOURCE: Urine Clean Catch                  COLLECTED:  03/23/21 22:06   ANTIBIOTICS AT [facility]. :                      RECEIVED :  03/23/21 22:06   CALL  [facility]  ICL tel. [phone],   ESBL called & read back by Gracy Morel, 03/26/2021 07:26, by INOCENCIO LOVING   Susceptibility    Escherichia coli (esbl) (1)    Antibiotic Interpretation KAYLEE Status    amoxicillin-clavulanate Resistant 4 mcg/mL     ampicillin Resistant >=32 mcg/mL     ceFAZolin Resistant >=64 mcg/mL     cefepime Resistant 2 mcg/mL     cefTRIAXone Resistant >=64 mcg/mL     ciprofloxacin Sensitive <=0.25 mcg/mL     gentamicin Sensitive <=1 mcg/mL     imipenem Sensitive <=0.25 mcg/mL     nitrofurantoin Sensitive <=16 mcg/mL     piperacillin-tazobactam Resistant <=4 mcg/mL     trimethoprim-sulfamethoxazole Sensitive <=20 mcg/mL         Chest x-rays show improvement diminishing infiltrate in left lung but still persistent areas that appears to be consistent with effusion      Review of Systems   Respiratory: Negative. Cardiovascular: Negative. Gastrointestinal: Negative. Physical Exam   Cardiovascular: Normal heart sounds. No murmur heard. Pulmonary/Chest: Effort normal and breath sounds normal. No respiratory distress. She has no wheezes. She has no rales. She exhibits no tenderness. Comfortable wearing nasal cannula oxygen talking in full sentences   Abdominal: Soft. She exhibits no distension and no mass. There is no abdominal tenderness. There is no rebound and no guarding. Genitourinary:    Genitourinary Comments: Sanchez catheter in clear urine         Blood pressure (!) 158/57, pulse 81, temperature 98.1 °F (36.7 °C), temperature source Oral, resp. rate 20, height 4' 11\" (1.499 m), weight 151 lb 4.8 oz (68.6 kg), SpO2 99 %, not currently breastfeeding.       .   Lab Results   Component Value Date    WBC 14.2 (H) 03/30/2021    HGB 9.2 (L) 03/30/2021    HCT 28.4 (L) 03/30/2021    MCV 89.5 03/30/2021     (H) 03/30/2021     Lab Results   Component Value Date     03/30/2021    K 4.7 03/30/2021    K 4.4 10/19/2020     03/30/2021    CO2 26 03/30/2021    BUN 39 03/30/2021    CREATININE 1.25 03/30/2021    GLUCOSE 185 03/30/2021    CALCIUM 10.3 03/30/2021            ASSESSMENT:  PLAN:    ESBL E. coli UTI   Meropenem

## 2021-03-30 NOTE — PROGRESS NOTES
Home O2 eval . Resting room air spo2= 100%. Patient says she does not walk. Patient does not qualify for home oxygen.  RW RRT

## 2021-03-30 NOTE — PLAN OF CARE
Nutrition Problem #1: Predicted inadequate energy intake  Intervention: Food and/or Nutrient Delivery: Continue Current Diet, Continue Oral Nutrition Supplement(Continue Diet Dysphagia Soft and Bite Sizedwith diabetic ONS (Chocolate) at breakfast and dinner)  Nutritional Goals: New goals: PO intake > 75% of meals and supplements, glucose < 180

## 2021-03-30 NOTE — PROGRESS NOTES
Neurology Follow up    SUBJECTIVE: Patient seen and examined for neurology follow-up for Hypoxic encephalopathy secondary to cardiopulmonary arrest with subsequent acute hypoxic respiratory failure requiring intubation and mechanical ventilation. Patient extubated on 3/26/21. Currently alert and oriented x3, no acute distress, cooperative. Patient with hypophonia secondary to hoarseness. Denies headache or vision changes. No dysphagia or dysarthria. Patient remains nonfocal and without seizure activity.     Current Facility-Administered Medications   Medication Dose Route Frequency Provider Last Rate Last Admin    carvedilol (COREG) tablet 37.5 mg  37.5 mg Oral BID Meera Don MD       Michaelfred Carisa Ennis ON 3/31/2021] predniSONE (DELTASONE) tablet 30 mg  30 mg Oral Daily Jodi Cobos MD        Followed by   [place]  ON 4/3/2021] predniSONE (DELTASONE) tablet 20 mg  20 mg Oral Daily Jodi Cobos MD        Followed by   Jolietalexi Landeros ON 4/6/2021] predniSONE (DELTASONE) tablet 10 mg  10 mg Oral Daily Jodi Cobos MD        verapamil (CALAN) tablet 120 mg  120 mg Oral BID Meera Don MD   120 mg at 03/30/21 0817    nystatin (MYCOSTATIN) 717397 UNIT/ML suspension 500,000 Units  5 mL Oral 4x Daily Lisa Gomez MD   500,000 Units at 03/30/21 1309    aspirin EC tablet 81 mg  81 mg Oral Daily Meera Don MD   81 mg at 03/30/21 0817    ipratropium-albuterol (DUONEB) nebulizer solution 1 ampule  1 ampule Inhalation TID Xavier Landau, DO   1 ampule at 03/30/21 1243    albuterol (PROVENTIL) nebulizer solution 2.5 mg  2.5 mg Nebulization Q2H PRN Xavier Landau, DO        insulin glargine (LANTUS) injection vial 20 Units  20 Units Subcutaneous Nightly Josiah Monae MD   20 Units at 03/29/21 2130    insulin lispro (HUMALOG) injection vial 0-12 Units  0-12 Units Subcutaneous TID WC Josiah Monae MD   2 Units at 03/30/21 1153    insulin lispro (HUMALOG) injection vial 0-6 Units  0-6 Units Subcutaneous Nightly Felice Cuellar MD   3 Units at 03/29/21 2130    sodium chloride flush 0.9 % injection 10 mL  10 mL Intravenous 2 times per day Eric Rome MD   10 mL at 03/30/21 0816    sodium chloride flush 0.9 % injection 10 mL  10 mL Intravenous PRN Eric Rome MD        cloNIDine (CATAPRES) tablet 0.2 mg  0.2 mg Oral BID Eric Rome MD   0.2 mg at 03/30/21 0817    glucose (GLUTOSE) 40 % oral gel 15 g  15 g Oral PRN Eric Rome MD        dextrose 50 % IV solution  12.5 g Intravenous PRN Eric Rome MD        glucagon (rDNA) injection 1 mg  1 mg Intramuscular PRN Eric Rome MD        dextrose 5 % solution  100 mL/hr Intravenous PRN Eric Rome MD        meropenem (MERREM) 1,000 mg in sodium chloride 0.9 % 100 mL IVPB (mini-bag)  1,000 mg Intravenous Q12H Homa Hernandez MD   Stopped at 03/30/21 0845    0.9 % sodium chloride infusion   Intravenous PRN Anastasiya Miller DO        nitroglycerin (NITRO-BID) 2 % ointment 1 inch  1 inch Topical 4 times per day Samuel Chisholm MD   1 inch at 03/30/21 1148    pantoprazole (PROTONIX) injection 40 mg  40 mg Intravenous Daily Eric Rome MD   40 mg at 03/30/21 1799    And    sodium chloride (PF) 0.9 % injection 10 mL  10 mL Intravenous Daily Eric Rome MD   10 mL at 03/30/21 0812    heparin (porcine) injection 5,000 Units  5,000 Units Subcutaneous 3 times per day Eric Rome MD   5,000 Units at 03/30/21 1309    docusate (COLACE) 50 MG/5ML liquid 100 mg  100 mg Oral BID Eric Rome MD   100 mg at 03/29/21 2118    polyethylene glycol (GLYCOLAX) packet 17 g  17 g Oral Daily Eric Rome MD   Stopped at 03/27/21 0803    budesonide (PULMICORT) nebulizer suspension 500 mcg  0.5 mg Nebulization BID Eric Rome MD   500 mcg at 03/30/21 0723    hydrALAZINE (APRESOLINE) injection 10 mg  10 mg Intravenous Q4H PRN Oletha Pen Sedar, DO   10 mg at 03/26/21 2321    Or    hydrALAZINE (APRESOLINE) injection 20 mg  20 mg Intravenous Q4H PRN Charlee Face Sedar, DO   20 mg at 03/25/21 2051    labetalol (NORMODYNE;TRANDATE) injection 20 mg  20 mg Intravenous Q4H PRN Andrews Mac, DO   20 mg at 03/24/21 1227    labetalol (NORMODYNE;TRANDATE) injection 40 mg  40 mg Intravenous Q4H PRN Andrews Mac, DO   40 mg at 03/27/21 1302    promethazine (PHENERGAN) tablet 12.5 mg  12.5 mg Oral Q6H PRN Otto Shipley MD        Or    ondansetron (ZOFRAN) injection 4 mg  4 mg Intravenous Q6H PRN Otto Shipley MD        chlorhexidine (PERIDEX) 0.12 % solution 15 mL  15 mL Mouth/Throat BID Otto Shipley MD   15 mL at 03/29/21 2118    sodium chloride flush 0.9 % injection 10 mL  10 mL Intravenous 2 times per day Otto Shipley MD   10 mL at 03/30/21 0819    sodium chloride flush 0.9 % injection 10 mL  10 mL Intravenous PRN Otto Shipley MD        acetaminophen (TYLENOL) tablet 650 mg  650 mg Oral Q6H PRN Otto Shipley MD   650 mg at 03/25/21 2153    Or    acetaminophen (TYLENOL) suppository 650 mg  650 mg Rectal Q6H PRN Otto Shipley MD   650 mg at 03/25/21 0153       PHYSICAL EXAM:    BP (!) 145/48   Pulse 77   Temp 98.4 °F (36.9 °C) (Oral)   Resp 20   Ht 4' 11\" (1.499 m)   Wt 151 lb 4.8 oz (68.6 kg)   LMP  (LMP Unknown)   SpO2 99%   BMI 30.56 kg/m²   General Appearance:      Skin:  normal  CVS - Normal sounds, No murmurs , No carotid Bruits  RS -CTA  Abdomen Soft, BS present  Review of Systems   Unable to perform ROS: Mental status change   Constitutional: Negative for chills, fever and unexpected weight change. HENT: Positive for voice change. Negative for congestion and trouble swallowing. Eyes: Negative for photophobia and visual disturbance. Respiratory: Negative for chest tightness, shortness of breath and wheezing. Cardiovascular: Negative for chest pain and leg swelling. Gastrointestinal: Negative for diarrhea, nausea and vomiting. Endocrine: Negative. Genitourinary: Negative.     Musculoskeletal: Negative for back pain, Echocardiography Report (TTE)  Demographics  Patient Name   Sherryle Porch  Gender              Female                 M  Patient Number [id number]        Race                Black                                 Ethnicity  Visit Number   [id number]       Room Number         IC05  Corporate ID                   Date of Study       03/19/2021  Accession      [id number]      Referring Physician  Number  Date of Birth  1945      Sonographer         Sravani MaeRick LPN  Age            67 year(s)      [facility] [place] [address] [facility]                                 Physician           Kai Sargent MD Procedure Type of Study  TTE procedure:ECHO COMPLETE 2D W/DOP W/COLOR. Procedure Date Date: 03/19/2021 Start: 09:15 AM Study Location: Portable Technical Quality: Good visualization Indications:Cardiac arrest. Patient Status: Routine Weight: 143 pounds BP: 89/48 mmHg Allergies   - Other allergy:(Darvon). Conclusions  Summary  Mitral annular calcification is present. 1-2+ MR  Normal tricuspid valve structure and function. 1+ TR  RVSP 31 mmHg  severe CLVH  LVEF 50%  E/A flow reversal noted. Suggestive of diastolic dysfunction. Signature  ----------------------------------------------------------------  Electronically signed by Kai Sargent MD(Interpreting  physician) on 03/19/2021 02:13 PM  ----------------------------------------------------------------  Findings Left Ventricle severe CLVH LVEF 50% E/A flow reversal noted. Suggestive of diastolic dysfunction. Right Ventricle Normal right ventricle structure and function. Normal right ventricle systolic pressure. Left Atrium Moderately dilated left atrium. Right Atrium Normal right atrium. Mitral Valve Mitral annular calcification is present. 1-2+ MR Tricuspid Valve Normal tricuspid valve structure and function. 1+ TR RVSP 31 mmHg Aortic Valve Normal aortic valve structure and function.  Pulmonic Valve The pulmonic valve was not well visualized . Pericardial Effusion No evidence of pericardial effusion. Pleural Effusion No evidence of pleural effusion. Aorta \ Miscellaneous The aorta is within normal limits. M-Mode Measurements (cm)  LVIDd: 4.01 cm                         LVIDs: 1.85 cm  IVSd: 1.45 cm                          IVSs: 1.23 cm  LVPWd: 1.32 cm                         LVPWs: 1.61 cm  Rt. Vent.  Dimension: 1.07 cm           AO Root Dimension: 2.8 cm                                         ACS: 1.47 cm                                         LA: 3.04 cm                                         LVOT: 1.98 cm Doppler Measurements:  AV Velocity:0.01 m/s                   MV Peak E-Wave: 0.96 m/s  AV Peak Gradient: 7.73 mmHg            MV Peak A-Wave: 1.58 m/s  AV Mean Gradient: 3.01 mmHg  AV Area (Continuity):1.27 cm^2         MV P1/2t: 105.6 msec  TR Velocity:2.29 m/s                   MVA by PHT2.08 cm^2  TR Gradient:21.01 mmHg                 Estimated RAP:10 mmHg                                         RVSP:31 mmHg Valves  Mitral Valve  Peak E-Wave: 0.96 m/s          Peak A-Wave: 1.58 m/s  P1/2t: 105.6 msec              E/A Ratio: 0.61                                 Peak Gradient: 3.7 mmHg  Area (PHT): 2.08 cm^2          Deceleration Time: 349.6 msec  MR Velocity: 4.66 m/s  Aortic Valve  Peak Velocity: 1.39 m/s                Mean Velocity: 0.79 m/s  Peak Gradient: 7.73 mmHg               Mean Gradient: 3.01 mmHg  Area (continuity): 1.27 cm^2  AV VTI: 28.28 cm  Cusp Separation: 1.47 cm  Tricuspid Valve  Estimated RVSP: 31 mmHg              Estimated RAP: 10 mmHg  TR Velocity: 2.29 m/s                TR Gradient: 21.01 mmHg  Pulmonic Valve  Peak Velocity: 0.68 m/s           Peak Gradient: 1.86 mmHg                                    Estimated PASP: 31.01 mmHg  LVOT  Peak Velocity: 0.72 m/s              Mean Velocity: 0.41 m/s  Peak Gradient: 2 mmHg                Mean Gradient: 0.9 mmHg  LVOT Diameter: 03/29/21  0513 03/30/21  0537   * 138 138   K 4.8 4.5 4.7   * 106 101   CO2 25 24 26   BUN 53* 40* 39*   CREATININE 1.42* 1.30* 1.25*   GLUCOSE 175* 249* 185*     Recent Labs     03/28/21  0504 03/29/21  0513   BILITOT <0.2 <0.2   ALKPHOS 97 99   AST 16 21   ALT 26 28     Lab Results   Component Value Date    PROTIME 14.4 03/29/2021    INR 1.1 03/29/2021     No results found for: LITHIUM, DILFRTOT, VALPROATE    ASSESSMENT AND PLAN  Encephalopathy secondary to cardiorespiratory arrest.  Patient may have suffered some hypoxemic damage though this very difficult to certain as patient is now extubated and off sedation   and she does appear to be improving each day. .  We will keep an eye on this repeat CT scan did not anything significant as far as mass lesions or structural lesions no early CT sign changes seen to suggest any acute ischemia and no evidence of any gray-white matter changes to suggest severe hypoxic ischemic changes. The patient does appear to be tolerating diet at this point which we will advance as tolerated. Patient may benefit from rehabilitation stay with cognitive and speech therapy. Hypoxic encephalopathy secondary to cardiopulmonary arrest with subsequent acute hypoxic respiratory failure requiring intubation and mechanical ventilation. Patient extubated on 3/26/21  Acute on chronic anemia with hemoglobin of 5.8 on arrival status post transfusion  Patient remains hypertensive with history of hypertensive cardiomyopathy. Patient with known moderate right renal artery stenosis attributing to resistant hypertension  DNR CCA, palliative care on consult  Encephalopathy has resolved. Nonfocal.  No seizure activity reported.   Continue to follow    Encephalopathy resolved  Patient also being treated for ESBL UTI  Nonfocal, continues to have generalized weakness  I have personally performed a face to face diagnostic evaluation on this patient, reviewed all data and investigations, and am the sole provider of all clinical decisions on the neurological status of this patient. encepaholopathy resolved       Mehul Zeng MD, Stormy Valverde, American Board of Psychiatry & Neurology  Board Certified in Vascular Neurology  Board Certified in Neuromuscular Medicine  Certified in . [place]

## 2021-03-30 NOTE — CARE COORDINATION
Rounded on patient to teach PN. Pt in isolation and with telesitter.  Electronically signed by Jerris Gottron, RN on 3/30/2021 at 4:18 PM

## 2021-03-30 NOTE — PROGRESS NOTES
INPATIENT PROGRESS NOTES    PATIENT NAME: Rachel Logan  MRN: [id number]  SERVICE DATE:  2021   SERVICE TIME:  11:49 AM      PRIMARY SERVICE: Pulmonary Disease    CHIEF COMPLAINTS: Hoarseness and stridor    INTERVAL HPI: Patient seen and examined at bedside, Interval Notes, orders reviewed. Nursing notes noted    Feels good, denies chest pain, voice still hoarse, denies shortness of breath, no coughing, no fever, slept well, no nausea no vomiting. She is on 2 L O2 saturation 99%. , using bipap at night     Review of system:     GI Abdominal pain No  Skin Rash No    Social History     Tobacco Use    Smoking status: Former Smoker     Packs/day: 1.00     Years: 59.00     Pack years: 59.00     Types: Cigarettes     Start date: 2017    Smokeless tobacco: Never Used    Tobacco comment: quit 2-3 weeks ago    Substance Use Topics    Alcohol use: Not Currently         Problem Relation Age of Onset    Heart Disease Father         cardiac bypass    Arthritis Father     Arthritis Mother     Other Mother          at age 80    Other Sister         [facility]    No Known Problems Daughter     Stroke Son          OBJECTIVE    Body mass index is 30.56 kg/m². PHYSICAL EXAM:  Vitals:  BP (!) 158/57   Pulse 81   Temp 98.1 °F (36.7 °C) (Oral)   Resp 20   Ht 4' 11\" (1.499 m)   Wt 151 lb 4.8 oz (68.6 kg)   LMP  (LMP Unknown)   SpO2 99%   BMI 30.56 kg/m²     General: alert, cooperative, no distress  Head: normocephalic, atraumatic  Eyes:No gross abnormalities. ENT:  MMM no lesions  Neck:  supple, no masses and stridor present slightly better compared to yesterday  Chest : Good air movement, no wheezing, no rales, nontender, tympanic  Heart[de-identified] Heart sounds are normal.  Regular rate and rhythm without murmur, gallop or rub.   ABD:  symmetric, soft, non-tender, no guarding or rebound  Musculoskeletal : no cyanosis, no clubbing and no edema  Neuro:  Grossly normal  Skin: No rashes or nodules noted.  Lymph node:  no cervical nodes  Urology: No Sanchez   Psychiatric: appropriate    DATA:   Recent Labs     03/29/21  0513 03/30/21  0533   WBC 11.5* 14.2*   HGB 9.1* 9.2*   HCT 27.6* 28.4*   MCV 89.3 89.5   * 448*     Recent Labs     03/28/21  0504 03/29/21  0513 03/30/21  0537   * 138 138   K 4.8 4.5 4.7   * 106 101   CO2 25 24 26   BUN 53* 40* 39*   CREATININE 1.42* 1.30* 1.25*   GLUCOSE 175* 249* 185*   CALCIUM 10.2* 10.0* 10.3*   PROT 6.2* 5.9*  --    LABALBU 2.9* 2.9* 2.9*   BILITOT <0.2 <0.2  --    ALKPHOS 97 99  --    AST 16 21  --    ALT 26 28  --    LABGLOM 35.9* 39.8* 41.6*   GFRAA 43.4* 48.1* 50.3*   GLOB 3.3 3.0  --        MV Settings:     Vent Mode: (S) CPAP  Vt Ordered: 350 mL  Rate Set: 16 bmp  FiO2 : 40 %  PEEP/CPAP: 6  Pressure Support: 5 cmH20  Peak Inspiratory Pressure: 14 cmH2O  Mean Airway Pressure: 9 cmH20  I:E Ratio: 1:1.60    No results for input(s): PHART, SQH7MLU, PO2ART, GZZ0QKV, BEART, F4QYNMBF in the last 72 hours.     O2 Device: Nasal cannula  O2 Flow Rate (L/min): 2 L/min    Dietary Nutrition Supplements: Diabetic Oral Supplement  DIET CARB CONTROL; Carb Control: 3 carb choices (45 gms)/meal; Dysphagia Soft and Bite-Sized     MEDICATIONS during current hospitalization:    Continuous Infusions:   dextrose      sodium chloride         Scheduled Meds:   carvedilol  37.5 mg Oral BID    verapamil  120 mg Oral BID    nystatin  5 mL Oral 4x Daily    predniSONE  40 mg Oral Daily    aspirin  81 mg Oral Daily    ipratropium-albuterol  1 ampule Inhalation TID    insulin glargine  20 Units Subcutaneous Nightly    insulin lispro  0-12 Units Subcutaneous TID WC    insulin lispro  0-6 Units Subcutaneous Nightly    sodium chloride flush  10 mL Intravenous 2 times per day    cloNIDine  0.2 mg Oral BID    meropenem  1,000 mg Intravenous Q12H    nitroglycerin  1 inch Topical 4 times per day    pantoprazole  40 mg Intravenous Daily    And    sodium chloride (PF)  10 mL Intravenous Daily    heparin (porcine)  5,000 Units Subcutaneous 3 times per day    docusate  100 mg Oral BID    polyethylene glycol  17 g Oral Daily    budesonide  0.5 mg Nebulization BID    chlorhexidine  15 mL Mouth/Throat BID    sodium chloride flush  10 mL Intravenous 2 times per day       PRN Meds:albuterol, sodium chloride flush, glucose, dextrose, glucagon (rDNA), dextrose, sodium chloride, hydrALAZINE **OR** hydrALAZINE, labetalol, labetalol, promethazine **OR** ondansetron, sodium chloride flush, acetaminophen **OR** acetaminophen    Radiology  Chest x-ray reviewed by me, residual small left-sided effusion, no infiltrate      IMPRESSION AND SUGGESTION:  Patient is at risk due to   · Acute hypoxic spelt failure, improving  · Volume overload, improving  · Stridor and bronchospasm, slowly improving  · ESBL UTI  · Left-sided pleural effusion, likely third spacing    Recommendations    · Watch volume status, avoid overload  · Change to prednisone start taper ENT consult  ·   · Antibiotics per ID  · Incentive spirometry and flutter valve  · BiPAP while asleep  · Will need sleep study as outpatient    DW   [id number]      Electronically signed by Helena Mccoy MD,  [facility]P   on 3/30/2021 at 11:49 AM

## 2021-03-30 NOTE — PROGRESS NOTES
Hospitalist Progress Note      PCP: Kandis Schroeder MD    Date of Admission: 3/18/2021    Chief Complaint:  No acute events, afebrile, stable HD, on 2 liters of NC,     Medications:  Reviewed    Infusion Medications    dextrose      sodium chloride       Scheduled Medications    carvedilol  37.5 mg Oral BID    verapamil  120 mg Oral BID    nystatin  5 mL Oral 4x Daily    predniSONE  40 mg Oral Daily    aspirin  81 mg Oral Daily    ipratropium-albuterol  1 ampule Inhalation TID    insulin glargine  20 Units Subcutaneous Nightly    insulin lispro  0-12 Units Subcutaneous TID WC    insulin lispro  0-6 Units Subcutaneous Nightly    sodium chloride flush  10 mL Intravenous 2 times per day    cloNIDine  0.2 mg Oral BID    meropenem  1,000 mg Intravenous Q12H    nitroglycerin  1 inch Topical 4 times per day    pantoprazole  40 mg Intravenous Daily    And    sodium chloride (PF)  10 mL Intravenous Daily    heparin (porcine)  5,000 Units Subcutaneous 3 times per day    docusate  100 mg Oral BID    polyethylene glycol  17 g Oral Daily    budesonide  0.5 mg Nebulization BID    chlorhexidine  15 mL Mouth/Throat BID    sodium chloride flush  10 mL Intravenous 2 times per day     PRN Meds: albuterol, sodium chloride flush, glucose, dextrose, glucagon (rDNA), dextrose, sodium chloride, hydrALAZINE **OR** hydrALAZINE, labetalol, labetalol, promethazine **OR** ondansetron, sodium chloride flush, acetaminophen **OR** acetaminophen      Intake/Output Summary (Last 24 hours) at 3/30/2021 1108  Last data filed at 3/30/2021 0402  Gross per 24 hour   Intake 780 ml   Output 2300 ml   Net -1520 ml       Exam:    BP (!) 158/57   Pulse 81   Temp 98.1 °F (36.7 °C) (Oral)   Resp 20   Ht 4' 11\" (1.499 m)   Wt 151 lb 4.8 oz (68.6 kg)   LMP  (LMP Unknown)   SpO2 99%   BMI 30.56 kg/m²     General appearance: awake, alert, cooperative  Lungs: coarse BS bilaterally  Heart: S1S2,RRR  Abdomen: soft, BS active  Extremities: no edema    Labs:   Recent Labs     03/28/21  0504 03/29/21  0513 03/30/21  0533   WBC 9.2 11.5* 14.2*   HGB 10.2* 9.1* 9.2*   HCT 31.0* 27.6* 28.4*   * 431* 448*     Recent Labs     03/28/21  0504 03/29/21  0513 03/30/21  0537   * 138 138   K 4.8 4.5 4.7   * 106 101   CO2 25 24 26   BUN 53* 40* 39*   CREATININE 1.42* 1.30* 1.25*   CALCIUM 10.2* 10.0* 10.3*   PHOS 3.7 2.6 3.1     Recent Labs     03/28/21  0504 03/29/21  0513   AST 16 21   ALT 26 28   BILITOT <0.2 <0.2   ALKPHOS 97 99     Recent Labs     03/27/21  1338 03/28/21  0504 03/29/21  0513   INR 1.0 1.1 1.1     No results for input(s): Osvaldo Saucedo in the last 72 hours. Urinalysis:      Lab Results   Component Value Date    NITRU Negative 10/18/2020    WBCUA 6-9 10/18/2020    BACTERIA Negative 10/18/2020    RBCUA 3-5 10/18/2020    BLOODU SMALL 10/18/2020    SPECGRAV 1.009 10/18/2020    GLUCOSEU Negative 10/18/2020       Radiology:  XR CHEST PORTABLE   Final Result      INTERVAL EXTUBATION. OTHERWISE, ESSENTIALLY STABLE CHEST COMPARED TO 3/25/2021. FL MODIFIED BARIUM SWALLOW W VIDEO   Final Result      ESSENTIALLY NEGATIVE MODIFIED BARIUM SWALLOW. A FULL REPORT WILL BE MADE BY THE SPEECH PATHOLOGY TEAM.               IR PICC WO SQ PORT/PUMP > 5 YEARS   Final Result      SUCCESSFUL PICC PLACEMENT WITHOUT IMMEDIATE COMPLICATIONS. 6.6 mGy of fluoroscopy was used, with 1 fluoroscopic still saved. No diagnostic images were obtained. XR CHEST PORTABLE   Final Result   LEFT PLEURAL EFFUSION. BILATERAL LOWER LUNG ATELECTASIS/PNEUMONIA. XR CHEST PORTABLE   Final Result   No radiographic evidence of acute intrathoracic process. There is an NG tube coursing into the stomach. There is a left pleural effusion and likely bibasilar atelectasis or infiltrate. CT HEAD WO CONTRAST   Final Result   Impression:      Mild cerebral atrophy.       Chronic ischemic white matter disease. Remote left thalamic lacunar infarct. Bilateral ethmoid and sphenoid sinusitis. All CT scans at this facility use dose modulation, iterative reconstruction, and/or weight based dosing when appropriate to reduce radiation dose to as low as reasonably achievable. XR CHEST PORTABLE   Final Result   INTERVAL RETRACTION OF THE ENDOTRACHEAL TUBE IN THE TRACHEA. CONSIDER RETRACTING ENDOTRACHEAL TUBE 1 CM. LEFT LOWER LUNG ATELECTASIS. XR CHEST PORTABLE   Final Result      CHEST ENDOTRACHEAL TUBE IN RIGHT MAINSTEM BRONCHUS. RECOMMENDING RETRACTING ENDOTRACHEAL TUBE 7 CM. INTERVAL DEVELOPMENT OF LEFT MID AND LEFT LOWER LUNG ATELECTASIS. INTERVAL RESOLUTION OF RIGHT LOWER LUNG ATELECTASIS. ABOVE FINDINGS DISCUSSED WITH PATIENT'S NURSE IGNACIO, VIA TELEPHONE, IN THE ICU, AT [address] [place] [address], MARCH 20, 2021. XR CHEST PORTABLE   Final Result   BIBASILAR SUBSEGMENTAL ATELECTASIS/PNEUMONIA IN THIS PARTIALLY EXPIRATORY CHEST RADIOGRAPH. XR CHEST PORTABLE   Final Result   RIGHT LOWER LUNG SUBSEGMENTAL ATELECTASIS/PNEUMONIA. INTERVAL INFERIOR MIGRATION ENDOTRACHEAL TUBE 3 CM WITH TIP NOW JUST PROXIMAL TO FAISAL. MAY WISH TO RETRACT ENDOTRACHEAL TUBE 2 CM.               Assessment/Plan:    67 y.o. female with PMH of nonobstructive CAD, diastolic HF, HOCM, HTN, IDDM2, CKD3, anemia, prolonged hospitalization in 16/0189 complicated by cardiac arrest s/p ROSC, SAÚL,septic shock who presented with:     Cardiac arrest s/p ROSC   - required Dopamine, norepinephrine and epinephrine infusion  - off pressors and hypothermia protocol  - TTE showed LVEF of 50%, severe LVH  - hypertensive, on Verapamil, Coreg, clonidine  - cardiology following     Acute hypoxic respiratory failure  - requiring intubation and mechanical ventilation  - extubated on 3/22, but had to be re-intubated same day due to stridor and respiratory distress  - extubated on 3/26 to BIPAP, no

## 2021-03-30 NOTE — PROGRESS NOTES
Progress Note  Patient: Jagruti Townsend  Unit/Bed: D860/Z233-27  YOB: 1945  MRN: [id number]  Acct: [de-identified]   Admitting Diagnosis: Cardiac arrest [facility]) [I46.9]  Admit Date:  3/18/2021  Hospital Day: 12    Chief Complaint: CPA    Histories:  Past Medical History:   Diagnosis Date    Anxiety     Asthma     dx 2019 / has smoked since age 14    CHF [facility]congestive heart failure) ([facility])     Chronic back pain     Bilateral L5 S1 Radic on emg--surprisingly worse on the left than the right--pt's symptoms and her MRI show worse on the right    Chronic obstructive pulmonary disease with acute exacerbation ([facility] [address] 10/12/2019    Depression     Fibromyalgia     Hyperlipidemia     meds > 8 yrs    Hypertension     meds > 45 yrs    On home O2     2l per n/c at bedtime mostly,     Osteoarthritis     Type II diabetes mellitus, uncontrolled ([facility] [address]     hx > 8 yrs    Unspecified sleep apnea      Past Surgical History:   Procedure Laterality Date    BACK SURGERY  2017    lumbar disc    CARDIAC CATHETERIZATION  11/3/14     DR. MIRELES / no stents    COLONOSCOPY  08/29/2016    w/polypectomy     COLONOSCOPY N/A 9/29/2020    COLONOSCOPY WITH POLYPECTOMY performed by Adriana Riley MD at [facility] N/A 10/7/2019    EUA HYSTEROSCOPY DILATATION AND CURETTAGE performed by Pipo Griffin DO at [facility]. [address], COLON, DIAGNOSTIC      EYE SURGERY      [place] with IOL OU / [address]    umbilical hernia repair    SC ESOPHAGOGASTRODUODENOSCOPY TRANSORAL DIAGNOSTIC N/A 3/24/2017    EGD ESOPHAGOGASTRODUODENOSCOPY performed by Ronny Noriega MD at [facility] 2/8/2018    negative findings    SC REVISE MEDIAN N/CARPAL TUNNEL SURG Left 6/5/2017    LEFT  CARPAL TUNNEL RELEASE performed by Alcon Orona MD [facility]E/F[facility] N/A 2/8/2018    TONSILLECTOMY      as child  UPPER GASTROINTESTINAL ENDOSCOPY  2016    w/bx     UPPER GASTROINTESTINAL ENDOSCOPY N/A 2020    EGD possible biopsy performed by Braden Malcolm MD at [place] [address] N/A 2020    EGD PUSH ENTEROSCOPY performed by Belén Chow MD at [facility]     Family History   Problem Relation Age of Onset    Heart Disease Father         cardiac bypass    Arthritis Father     Arthritis Mother     Other Mother          at age 80    Other Sister         [facility]     No Known Problems Daughter     Stroke Son      Social History     Socioeconomic History    Marital status:      Spouse name: Kendra Mullen Number of children: 2    Years of education: 15    Highest education level: High school graduate   Occupational History    Occupation: Retired-   Social Needs    Financial resource strain: Not hard at all   [place] insecurity     Worry: Never true     Inability: Never true    Transportation needs     Medical: No     Non-medical: No   Tobacco Use    Smoking status: Former Smoker     Packs/day: 1.00     Years: 59.00     Pack years: 59.00     Types: Cigarettes     Start date: 2017    Smokeless tobacco: Never Used    Tobacco comment: quit 2-3 weeks ago    Substance and Sexual Activity    Alcohol use: Not Currently    Drug use: No    Sexual activity: Yes     Partners: Male   Lifestyle    Physical activity     Days per week: 0 days     Minutes per session: 0 min    Stress: Only a little   Relationships    Social connections     Talks on phone: More than three times a week     Gets together: More than three times a week     Attends Buddhism service: 1 to 4 times per year     Active member of club or organization: No     Attends meetings of clubs or organizations: Never     Relationship status: Living with partner   [facility] Intimate partner violence     Fear of current or ex partner: No     Emotionally abused:  No Physically abused: No     Forced sexual activity: No   Other Topics Concern    None   Social History Narrative    Grew up in [place]     Lives With:  [id number] Spouse    Type of Home: House    Home Layout: Two level, Performs ADL's on one level    Home Access: Stairs to enter with rails    Entrance Stairs - Number of Steps: 4    Bathroom Shower/Tub: Tub/Shower unit, Doors    Bathroom Equipment: Shower chair, Grab bars in [place]: Rolling walker, Cane, Oxygen(uses her O2 very seldom)    ADL Assistance: Needs assistance    Homemaking Assistance: Needs assistance    Homemaking Responsibilities: No(spouse performs)    Ambulation Assistance: Independent    Transfer Assistance: Independent    Active : No    Occupation: Retired    Type of occupation: worked in [employer]: patient enjoys televsision       Subjective/HPI no events overnihgt. Has left sided CP when she is reaching for objects likely related to injury from CPR,  Voice less hoarse.  in room. EKG: ST 88      Review of Systems:   Review of Systems   Constitutional: Negative. Negative for diaphoresis and fatigue. HENT: Positive for sore throat. Eyes: Negative. Respiratory: Negative. Negative for cough, chest tightness, shortness of breath, wheezing and stridor. Cardiovascular: Negative. Negative for chest pain, palpitations and leg swelling. Gastrointestinal: Negative. Negative for blood in stool and nausea. Genitourinary: Negative. Musculoskeletal: Negative. Skin: Negative. Neurological: Positive for weakness. Negative for dizziness, syncope and light-headedness. Hematological: Negative. Psychiatric/Behavioral: Negative. Physical Examination:    BP (!) 158/57   Pulse 81   Temp 98.1 °F (36.7 °C) (Oral)   Resp 20   Ht 4' 11\" (1.499 m)   Wt 151 lb 4.8 oz (68.6 kg)   LMP  (LMP Unknown)   SpO2 99%   BMI 30.56 kg/m²    Physical Exam   Constitutional: She appears healthy. No distress. HENT:   Normal cephalic and Atraumatic   Eyes: Pupils are equal, round, and reactive to light. Neck: Normal range of motion and thyroid normal. Neck supple. No JVD present. No neck adenopathy. No thyromegaly present. Cardiovascular: Normal rate, regular rhythm, intact distal pulses and normal pulses. Murmur heard. Pulmonary/Chest: Effort normal and breath sounds normal. She has no wheezes. She has no rales. She exhibits no tenderness. Abdominal: Soft. Bowel sounds are normal. There is no abdominal tenderness. Musculoskeletal: Normal range of motion. General: No tenderness or edema. Neurological: She is alert and oriented to person, place, and time. Skin: Skin is warm. No cyanosis. Nails show no clubbing.        LABS:  CBC:   Lab Results   Component Value Date    WBC 14.2 03/30/2021    RBC 3.17 03/30/2021    HGB 9.2 03/30/2021    HCT 28.4 03/30/2021    MCV 89.5 03/30/2021    MCH 29.1 03/30/2021    MCHC 32.5 03/30/2021    RDW 15.9 03/30/2021     03/30/2021    MPV 8.8 08/01/2015     CBC with Differential:    Lab Results   Component Value Date    WBC 14.2 03/30/2021    RBC 3.17 03/30/2021    HGB 9.2 03/30/2021    HCT 28.4 03/30/2021     03/30/2021    MCV 89.5 03/30/2021    MCH 29.1 03/30/2021    MCHC 32.5 03/30/2021    RDW 15.9 03/30/2021    NRBC 1 08/28/2020    LYMPHOPCT 8.6 03/30/2021    MONOPCT 7.3 03/30/2021    BASOPCT 0.4 03/30/2021    MONOSABS 1.0 03/30/2021    LYMPHSABS 1.2 03/30/2021    EOSABS 0.0 03/30/2021    BASOSABS 0.1 03/30/2021     CMP:    Lab Results   Component Value Date     03/30/2021    K 4.7 03/30/2021    K 4.4 10/19/2020     03/30/2021    CO2 26 03/30/2021    BUN 39 03/30/2021    CREATININE 1.25 03/30/2021    GFRAA 50.3 03/30/2021    LABGLOM 41.6 03/30/2021    GLUCOSE 185 03/30/2021    PROT 5.9 03/29/2021    LABALBU 2.9 03/30/2021    CALCIUM 10.3 03/30/2021    BILITOT <0.2 03/29/2021    ALKPHOS 99 03/29/2021    AST 21 03/29/2021    ALT 28 03/29/2021     BMP:    Lab Results   Component Value Date     03/30/2021    K 4.7 03/30/2021    K 4.4 10/19/2020     03/30/2021    CO2 26 03/30/2021    BUN 39 03/30/2021    LABALBU 2.9 03/30/2021    CREATININE 1.25 03/30/2021    CALCIUM 10.3 03/30/2021    GFRAA 50.3 03/30/2021    LABGLOM 41.6 03/30/2021    GLUCOSE 185 03/30/2021     Magnesium:    Lab Results   Component Value Date    MG 1.7 03/30/2021     Troponin:    Lab Results   Component Value Date    TROPONINI 0.056 03/19/2021        Active Hospital Problems    Diagnosis Date Noted    Goals of care, counseling/discussion [Z71.89]      Priority: Low    Bradycardia [R00.1]      Priority: Low    Moderate hypoxic-ischemic encephalopathy [P91.62]      Priority: Low    Acute respiratory failure with hypoxia (HCC) [J96.01]      Priority: Low    Cardiac arrest ([facility] [address] [I46.9] 03/18/2021     Priority: Low    Encephalopathy acute [G93.40]      Priority: Low        Assessment/Plan:  1. CPA   2. Respiratory failure-extubated. Stable  3. Aspiration PNA - on ABX  4. Encephalopathy-resolved  5. CAD - LAD mild - will resume low dose ASA  6. HTN - still labile. Continue Verapamil to 120 bid. Advance Coreg to 37.5 bid LVEF 55%  7. Echo Severe LVH. 1-2+ MR RVSP 31 mmHg. LVEF 50%  8. History of HCM - need to Keep HR close to 60s. Continue and nitrate. We will change to PO form soon. 9. SAÚL - Nephrology following- stable. She has known moderate Right LYNDON from [address] 6/30/2020 - we will address this by CTA in future as out pt. 10. Accelerated HTN- adjust meds.    6. PT OT      Electronically signed by Marcelo Avery MD on 3/30/2021 at 9:25 AM

## 2021-03-31 LAB
ALBUMIN SERPL-MCNC: 3 G/DL (ref 3.5–4.6)
ANION GAP SERPL CALCULATED.3IONS-SCNC: 12 MEQ/L (ref 9–15)
ANISOCYTOSIS: ABNORMAL
BANDED NEUTROPHILS RELATIVE PERCENT: 1 % (ref 5–11)
BASOPHILS ABSOLUTE: 0 K/UL (ref 0–0.2)
BASOPHILS RELATIVE PERCENT: 0.3 %
BUN BLDV-MCNC: 46 MG/DL (ref 8–23)
CALCIUM SERPL-MCNC: 9.6 MG/DL (ref 8.5–9.9)
CHLORIDE BLD-SCNC: 100 MEQ/L (ref 95–107)
CO2: 26 MEQ/L (ref 20–31)
CREAT SERPL-MCNC: 1.44 MG/DL (ref 0.5–0.9)
EOSINOPHILS ABSOLUTE: 0 K/UL (ref 0–0.7)
EOSINOPHILS RELATIVE PERCENT: 0.3 %
GFR AFRICAN AMERICAN: 42.7
GFR NON-AFRICAN AMERICAN: 35.3
GLUCOSE BLD-MCNC: 243 MG/DL (ref 60–115)
GLUCOSE BLD-MCNC: 291 MG/DL (ref 60–115)
GLUCOSE BLD-MCNC: 412 MG/DL (ref 60–115)
GLUCOSE BLD-MCNC: 420 MG/DL (ref 60–115)
GLUCOSE BLD-MCNC: 76 MG/DL (ref 70–99)
GLUCOSE BLD-MCNC: 82 MG/DL (ref 60–115)
HCT VFR BLD CALC: 27.5 % (ref 37–47)
HEMOGLOBIN: 9.1 G/DL (ref 12–16)
HYPOCHROMIA: ABNORMAL
LYMPHOCYTES ABSOLUTE: 2.3 K/UL (ref 1–4.8)
LYMPHOCYTES RELATIVE PERCENT: 12 %
MAGNESIUM: 1.7 MG/DL (ref 1.7–2.4)
MCH RBC QN AUTO: 29.3 PG (ref 27–31.3)
MCHC RBC AUTO-ENTMCNC: 33.1 % (ref 33–37)
MCV RBC AUTO: 88.6 FL (ref 82–100)
MONOCYTES ABSOLUTE: 0.8 K/UL (ref 0.2–0.8)
MONOCYTES RELATIVE PERCENT: 3.7 %
NEUTROPHILS ABSOLUTE: 16.2 K/UL (ref 1.4–6.5)
NEUTROPHILS RELATIVE PERCENT: 83 %
NUCLEATED RED BLOOD CELLS: 2 /100 WBC
PDW BLD-RTO: 16.5 % (ref 11.5–14.5)
PERFORMED ON: ABNORMAL
PERFORMED ON: NORMAL
PHOSPHORUS: 2.6 MG/DL (ref 2.3–4.8)
PLATELET # BLD: 484 K/UL (ref 130–400)
PLATELET SLIDE REVIEW: ABNORMAL
POIKILOCYTES: ABNORMAL
POTASSIUM SERPL-SCNC: 4.2 MEQ/L (ref 3.4–4.9)
RBC # BLD: 3.1 M/UL (ref 4.2–5.4)
SODIUM BLD-SCNC: 138 MEQ/L (ref 135–144)
WBC # BLD: 19.3 K/UL (ref 4.8–10.8)

## 2021-03-31 PROCEDURE — 2700000000 HC OXYGEN THERAPY PER DAY

## 2021-03-31 PROCEDURE — 2580000003 HC RX 258: Performed by: INTERNAL MEDICINE

## 2021-03-31 PROCEDURE — 2060000000 HC ICU INTERMEDIATE R&B

## 2021-03-31 PROCEDURE — 99232 SBSQ HOSP IP/OBS MODERATE 35: CPT | Performed by: INTERNAL MEDICINE

## 2021-03-31 PROCEDURE — C9113 INJ PANTOPRAZOLE SODIUM, VIA: HCPCS | Performed by: INTERNAL MEDICINE

## 2021-03-31 PROCEDURE — 6370000000 HC RX 637 (ALT 250 FOR IP): Performed by: INTERNAL MEDICINE

## 2021-03-31 PROCEDURE — 94664 DEMO&/EVAL PT USE INHALER: CPT

## 2021-03-31 PROCEDURE — 97112 NEUROMUSCULAR REEDUCATION: CPT

## 2021-03-31 PROCEDURE — 6360000002 HC RX W HCPCS: Performed by: INTERNAL MEDICINE

## 2021-03-31 PROCEDURE — 97535 SELF CARE MNGMENT TRAINING: CPT

## 2021-03-31 PROCEDURE — 85025 COMPLETE CBC W/AUTO DIFF WBC: CPT

## 2021-03-31 PROCEDURE — 94761 N-INVAS EAR/PLS OXIMETRY MLT: CPT

## 2021-03-31 PROCEDURE — 51702 INSERT TEMP BLADDER CATH: CPT

## 2021-03-31 PROCEDURE — 83735 ASSAY OF MAGNESIUM: CPT

## 2021-03-31 PROCEDURE — 99232 SBSQ HOSP IP/OBS MODERATE 35: CPT | Performed by: NURSE PRACTITIONER

## 2021-03-31 PROCEDURE — 80069 RENAL FUNCTION PANEL: CPT

## 2021-03-31 PROCEDURE — 99233 SBSQ HOSP IP/OBS HIGH 50: CPT | Performed by: INTERNAL MEDICINE

## 2021-03-31 PROCEDURE — 92526 ORAL FUNCTION THERAPY: CPT

## 2021-03-31 PROCEDURE — 99222 1ST HOSP IP/OBS MODERATE 55: CPT | Performed by: INTERNAL MEDICINE

## 2021-03-31 PROCEDURE — 94640 AIRWAY INHALATION TREATMENT: CPT

## 2021-03-31 PROCEDURE — APPSS15 APP SPLIT SHARED TIME 0-15 MINUTES: Performed by: NURSE PRACTITIONER

## 2021-03-31 RX ORDER — INSULIN GLARGINE 100 [IU]/ML
25 INJECTION, SOLUTION SUBCUTANEOUS
Status: DISCONTINUED | OUTPATIENT
Start: 2021-04-01 | End: 2021-04-01

## 2021-03-31 RX ORDER — CARVEDILOL 25 MG/1
50 TABLET ORAL 2 TIMES DAILY
Status: DISCONTINUED | OUTPATIENT
Start: 2021-03-31 | End: 2021-04-02 | Stop reason: HOSPADM

## 2021-03-31 RX ADMIN — HEPARIN SODIUM 5000 UNITS: 5000 INJECTION INTRAVENOUS; SUBCUTANEOUS at 13:46

## 2021-03-31 RX ADMIN — CARVEDILOL 50 MG: 25 TABLET, FILM COATED ORAL at 09:41

## 2021-03-31 RX ADMIN — NITROGLYCERIN 1 INCH: 20 OINTMENT TOPICAL at 13:58

## 2021-03-31 RX ADMIN — SODIUM CHLORIDE, PRESERVATIVE FREE 10 ML: 5 INJECTION INTRAVENOUS at 09:44

## 2021-03-31 RX ADMIN — MEROPENEM 1000 MG: 1 INJECTION, POWDER, FOR SOLUTION INTRAVENOUS at 20:23

## 2021-03-31 RX ADMIN — Medication 10 ML: at 09:45

## 2021-03-31 RX ADMIN — ASPIRIN 81 MG: 81 TABLET, COATED ORAL at 09:41

## 2021-03-31 RX ADMIN — CLONIDINE HYDROCHLORIDE 0.2 MG: 0.1 TABLET ORAL at 09:41

## 2021-03-31 RX ADMIN — Medication 10 ML: at 21:00

## 2021-03-31 RX ADMIN — NYSTATIN 500000 UNITS: 100000 SUSPENSION ORAL at 17:06

## 2021-03-31 RX ADMIN — NYSTATIN 500000 UNITS: 100000 SUSPENSION ORAL at 13:46

## 2021-03-31 RX ADMIN — NITROGLYCERIN 1 INCH: 20 OINTMENT TOPICAL at 17:06

## 2021-03-31 RX ADMIN — DOCUSATE SODIUM 100 MG: 50 LIQUID ORAL at 20:16

## 2021-03-31 RX ADMIN — CLONIDINE HYDROCHLORIDE 0.2 MG: 0.1 TABLET ORAL at 20:19

## 2021-03-31 RX ADMIN — INSULIN GLARGINE 20 UNITS: 100 INJECTION, SOLUTION SUBCUTANEOUS at 20:55

## 2021-03-31 RX ADMIN — ALBUTEROL SULFATE 2.5 MG: 2.5 SOLUTION RESPIRATORY (INHALATION) at 09:22

## 2021-03-31 RX ADMIN — HEPARIN SODIUM 5000 UNITS: 5000 INJECTION INTRAVENOUS; SUBCUTANEOUS at 22:01

## 2021-03-31 RX ADMIN — VERAPAMIL HYDROCHLORIDE 120 MG: 80 TABLET, FILM COATED ORAL at 20:19

## 2021-03-31 RX ADMIN — IPRATROPIUM BROMIDE AND ALBUTEROL SULFATE 1 AMPULE: .5; 3 SOLUTION RESPIRATORY (INHALATION) at 13:14

## 2021-03-31 RX ADMIN — BUDESONIDE 500 MCG: 0.5 SUSPENSION RESPIRATORY (INHALATION) at 19:24

## 2021-03-31 RX ADMIN — DOCUSATE SODIUM 100 MG: 50 LIQUID ORAL at 09:40

## 2021-03-31 RX ADMIN — POLYETHYLENE GLYCOL 3350 17 G: 17 POWDER, FOR SOLUTION ORAL at 09:41

## 2021-03-31 RX ADMIN — NYSTATIN 500000 UNITS: 100000 SUSPENSION ORAL at 20:16

## 2021-03-31 RX ADMIN — NYSTATIN 500000 UNITS: 100000 SUSPENSION ORAL at 09:40

## 2021-03-31 RX ADMIN — CARVEDILOL 50 MG: 25 TABLET, FILM COATED ORAL at 20:16

## 2021-03-31 RX ADMIN — IPRATROPIUM BROMIDE AND ALBUTEROL SULFATE 1 AMPULE: .5; 3 SOLUTION RESPIRATORY (INHALATION) at 19:24

## 2021-03-31 RX ADMIN — SODIUM CHLORIDE, PRESERVATIVE FREE 10 ML: 5 INJECTION INTRAVENOUS at 22:10

## 2021-03-31 RX ADMIN — HEPARIN SODIUM 5000 UNITS: 5000 INJECTION INTRAVENOUS; SUBCUTANEOUS at 05:54

## 2021-03-31 RX ADMIN — NITROGLYCERIN 1 INCH: 20 OINTMENT TOPICAL at 05:54

## 2021-03-31 RX ADMIN — MEROPENEM 1000 MG: 1 INJECTION, POWDER, FOR SOLUTION INTRAVENOUS at 09:40

## 2021-03-31 RX ADMIN — PREDNISONE 30 MG: 10 TABLET ORAL at 09:42

## 2021-03-31 RX ADMIN — PANTOPRAZOLE SODIUM 40 MG: 40 INJECTION, POWDER, FOR SOLUTION INTRAVENOUS at 09:40

## 2021-03-31 ASSESSMENT — ENCOUNTER SYMPTOMS
CHEST TIGHTNESS: 0
VOICE CHANGE: 1
PHOTOPHOBIA: 0
BACK PAIN: 0
GASTROINTESTINAL NEGATIVE: 1
RESPIRATORY NEGATIVE: 1
NAUSEA: 0
STRIDOR: 0
SORE THROAT: 1
COUGH: 0
WHEEZING: 0
CONSTIPATION: 0
TROUBLE SWALLOWING: 1
SHORTNESS OF BREATH: 0
DIARRHEA: 0
EYES NEGATIVE: 1
BLOOD IN STOOL: 0
TROUBLE SWALLOWING: 0
VOMITING: 0
ALLERGIC/IMMUNOLOGIC NEGATIVE: 1

## 2021-03-31 ASSESSMENT — PAIN SCALES - GENERAL
PAINLEVEL_OUTOF10: 0
PAINLEVEL_OUTOF10: 0

## 2021-03-31 NOTE — CARE COORDINATION
Per the NP for Palliative Care, the patient's  is agreeable to [facility]. The LSW updated the . Electronically signed by Gretchen Lim on 3/31/21 at 11:46 AM EDT    The LSW updated Naye Key, that the patient is a possible discharge home tomorrow. Medical Services will need to deliver the hospital bed to the patient's home. Per the , the home O2 eval has not been completed yet.  Electronically signed by Gretchen Lim on 3/31/21 at 3:49 PM EDT

## 2021-03-31 NOTE — PROGRESS NOTES
[facility] Occupational Therapy      Date: 3/31/2021  Patient Name: Argentina Groves        MRN: [id number]  Account: [de-identified]   : 1945  (67 y.o.)  Room: [facility]/[place] [address]    Chart reviewed, attempted OT at [phone] for treatmenr. Patient not seen 2° to:    Pt. declined, stating: \"I just had exercise and I want to nap. \" Offered alternatives for therapy and pt continued to decline. Spoke to [facility], RN aware. Will attempt again when able.     Electronically signed by KATERYNA Post on 3/31/2021 at 4:27 PM

## 2021-03-31 NOTE — CONSULTS
[address]                      [facility], [address] [facility]                                  CONSULTATION    PATIENT NAME: Sasha Nava                   :        1945  MED REC NO:   [id number]                            ROOM:       O191  ACCOUNT NO:   [de-identified]                           ADMIT DATE: 2021  PROVIDER:     Autumn Holloway MD    CONSULT DATE:  2021    REFERRING PROVIDER:  Regla Jones MD    REASON FOR CONSULTATION:  ENT evaluation for dysphonia. HISTORY OF PRESENT ILLNESS:  This is a 58-year-old female who came in to  the hospital with shortness of breath and passing out at home. The  patient came in to the hospital and required intubation in the emergency  room. The patient had extubation and then reintubation and now has been  extubated and has hoarseness of voice. PAST MEDICAL HISTORY:  Significant for congestive heart failure, chronic  obstructive pulmonary disease, fibromyalgia, hyperlipidemia,  hypertension, osteoarthritis, chronic back pain, type 2 diabetes. PAST SURGICAL HISTORY:  Back surgery, cardiac catheterization,  colonoscopy, EGD. ALLERGIES:  The patient's allergies include IBUPROFEN, METFORMIN and  DARVON. PHYSICAL EXAMINATION:  GENERAL:  This is a 58-year-old female who is in her bed in stable  condition. VITAL SIGNS:  Stable. HEENT:  Examination of the ears revealed no auricular lesions and ear  canals are clear. Tympanic membranes are intact bilaterally. Nose:  No  external nasal deformity. Anterior rhinoscopy:  Mucosa is congested. Oral cavity:  No mucosal lesions noted. Oropharynx:  No lesions. Flexible laryngoscopic examination performed after decongestion of the  nasal mucosa and topical anesthesia of the nose and hypopharynx. The  examination revealed polypoid change on the right side and erythema with  less movement on the left side. NECK:  No obvious masses.     LABORATORY DATA:  The patient's labs were reviewed. IMPRESSION:  1. Dysphonia, acute onset. 2.  Possible intubation injury. 3.  COPD. 4.  Status post cardiac arrest.  5.  Diabetes. PLAN:  I will follow her in the office. It is okay to discharge her  from ENT point. Thank you Dr. José Miguel Franz for this consult.         Renata Correa MD    D: 03/31/2021 12:57:03       T: 03/31/2021 14:32:39     [email or web address]  Job#: [id number]     Doc#: [id number]    CC:  MD Rosemary Gonzalez MD

## 2021-03-31 NOTE — PROGRESS NOTES
INPATIENT PROGRESS NOTES    PATIENT NAME: Bhavani Weber  MRN: [id number]  SERVICE DATE:  2021   SERVICE TIME:  2:48 PM      PRIMARY SERVICE: Pulmonary Disease    CHIEF COMPLAINTS: Hoarseness and stridor    INTERVAL HPI: Patient seen and examined at bedside, Interval Notes, orders reviewed. Nursing notes noted    Patient feels better, shortness of breath improved, hoarseness improved, she was seen by ENT today found to have edema in her upper airways, otherwise no issues. No fever, she is now on 3 L O2 saturation 100%. Review of system:     GI Abdominal pain No  Skin Rash No    Social History     Tobacco Use    Smoking status: Former Smoker     Packs/day: 1.00     Years: 59.00     Pack years: 59.00     Types: Cigarettes     Start date: 2017    Smokeless tobacco: Never Used    Tobacco comment: quit 2-3 weeks ago    Substance Use Topics    Alcohol use: Not Currently         Problem Relation Age of Onset    Heart Disease Father         cardiac bypass    Arthritis Father     Arthritis Mother     Other Mother          at age 80    Other Sister         [facility]    No Known Problems Daughter     Stroke Son          OBJECTIVE    Body mass index is 29.77 kg/m². PHYSICAL EXAM:  Vitals:  BP (!) 161/71   Pulse 79   Temp 98.2 °F (36.8 °C) (Oral)   Resp 18   Ht 4' 11\" (1.499 m)   Wt 147 lb 6.4 oz (66.9 kg)   LMP  (LMP Unknown)   SpO2 100%   BMI 29.77 kg/m²     General: alert, cooperative, no distress  Head: normocephalic, atraumatic  Eyes:No gross abnormalities. ENT:  MMM no lesions  Neck:  supple, no masses , minimal stridor, improved significantly compared to yesterday  Chest : Good air movement, no wheezing, no rales, nontender, tympanic  Heart[de-identified] Heart sounds are normal.  Regular rate and rhythm without murmur, gallop or rub.   ABD:  symmetric, soft, non-tender, no guarding or rebound  Musculoskeletal : no cyanosis, no clubbing and no edema  Neuro:  Grossly normal  Skin: No rashes or nodules noted. Lymph node:  no cervical nodes  Urology: No Sanchez   Psychiatric: appropriate    DATA:   Recent Labs     03/30/21  0533 03/31/21  0600   WBC 14.2* 19.3*   HGB 9.2* 9.1*   HCT 28.4* 27.5*   MCV 89.5 88.6   * 484*     Recent Labs     03/29/21  0513 03/30/21  0537 03/31/21  0600    138 138   K 4.5 4.7 4.2    101 100   CO2 24 26 26   BUN 40* 39* 46*   CREATININE 1.30* 1.25* 1.44*   GLUCOSE 249* 185* 76   CALCIUM 10.0* 10.3* 9.6   PROT 5.9*  --   --    LABALBU 2.9* 2.9* 3.0*   BILITOT <0.2  --   --    ALKPHOS 99  --   --    AST 21  --   --    ALT 28  --   --    LABGLOM 39.8* 41.6* 35.3*   GFRAA 48.1* 50.3* 42.7*   GLOB 3.0  --   --        MV Settings:     Vent Mode: (S) CPAP  Vt Ordered: 350 mL  Rate Set: 16 bmp  FiO2 : 40 %  PEEP/CPAP: 6  Pressure Support: 5 cmH20  Peak Inspiratory Pressure: 14 cmH2O  Mean Airway Pressure: 9 cmH20  I:E Ratio: 1:1.60    No results for input(s): PHART, FVS7GED, PO2ART, OAH8OWX, BEART, D7LJQBSE in the last 72 hours.     O2 Device: None (Room air)  O2 Flow Rate (L/min): 3 L/min    Dietary Nutrition Supplements: Diabetic Oral Supplement  DIET CARB CONTROL; Carb Control: 3 carb choices (45 gms)/meal; Dysphagia Soft and Bite-Sized     MEDICATIONS during current hospitalization:    Continuous Infusions:   dextrose      sodium chloride         Scheduled Meds:   carvedilol  50 mg Oral BID    predniSONE  30 mg Oral Daily    Followed by   Shelia Gentile ON 4/3/2021] predniSONE  20 mg Oral Daily    Followed by   Shelia Gentile ON 4/6/2021] predniSONE  10 mg Oral Daily    verapamil  120 mg Oral BID    nystatin  5 mL Oral 4x Daily    aspirin  81 mg Oral Daily    ipratropium-albuterol  1 ampule Inhalation TID    insulin glargine  20 Units Subcutaneous Nightly    insulin lispro  0-12 Units Subcutaneous TID WC    insulin lispro  0-6 Units Subcutaneous Nightly    sodium chloride flush  10 mL Intravenous 2 times per day    cloNIDine  0.2 mg Oral BID    meropenem  1,000 mg Intravenous Q12H    nitroglycerin  1 inch Topical 4 times per day    pantoprazole  40 mg Intravenous Daily    And    sodium chloride (PF)  10 mL Intravenous Daily    heparin (porcine)  5,000 Units Subcutaneous 3 times per day    docusate  100 mg Oral BID    polyethylene glycol  17 g Oral Daily    budesonide  0.5 mg Nebulization BID    sodium chloride flush  10 mL Intravenous 2 times per day       PRN Meds:albuterol, sodium chloride flush, glucose, dextrose, glucagon (rDNA), dextrose, sodium chloride, hydrALAZINE **OR** hydrALAZINE, labetalol, labetalol, promethazine **OR** ondansetron, sodium chloride flush, acetaminophen **OR** acetaminophen    Radiology  Chest x-ray reviewed by me, residual small left-sided effusion, no infiltrate      IMPRESSION AND SUGGESTION:  Patient is at risk due to   · Acute hypoxic spelt failure, improving  · Volume overload, improving  · Stridor improved significantly, secondary to pulmonary edema  · ESBL UTI  · Left-sided pleural effusion, likely third spacing    Recommendations    · Watch volume status, avoid overload  · Start taper prednisone  · Antibiotics per ID  · Incentive spirometry and flutter valve  · BiPAP while asleep  · Will need sleep study as outpatient         Electronically signed by So Jones MD,  [facility]   on 3/31/2021 at 2:48 PM

## 2021-03-31 NOTE — FLOWSHEET NOTE
0105 - Pt has been constantly trying to get out of bed and Justa Tanisha from [facility] continues to call. Pt is not redirectable. Dara Morris NP notified. Jose Miguel Alba NP notified again of pt's refusal to stay in bed. Pt also being verbally aggressive to staff. 0130 - Lap belt ordered for pt safety. Pt had intermittent confusion through out night, saying she needed to go to the store. PT's call light within reach and bed alarm active.

## 2021-03-31 NOTE — PROGRESS NOTES
Physical Therapy Med Surg Daily Treatment Note  Facility/Department: [address]  Room: [place] [id number]       NAME: Zadie Najjar  : 1945 (43 y.o.)  MRN: [id number]  CODE STATUS: Limited    Date of Service: 3/31/2021    Patient Diagnosis(es): Cardiac arrest [facility]) [I46.9]   Chief Complaint   Patient presents with    Respiratory Distress     Patient Active Problem List    Diagnosis Date Noted    Cardiopulmonary arrest [facility])      Priority: High    Lumbosacral radiculopathy at L5 2015     Priority: High     Class: Acute    Spinal stenosis of lumbar region with neurogenic claudication 2015     Priority: High     Class: Chronic    High risk medication use-Norco - 17 OARRS PM&R, 18 OARRS PM&R, 18 OARRS PM&R, Urine Drug screen negative 17 PM&R--MED CONTRACT 2014     Priority: High    Goals of care, counseling/discussion     Bradycardia     Moderate hypoxic-ischemic encephalopathy     Acute respiratory failure with hypoxia (HCC)     Cardiac arrest ([address] 2021    Gait abnormality 10/14/2020    Late effects of CVA (cerebrovascular accident) 10/14/2020    Sepsis ([address] 10/06/2020    Major depressive disorder in remission ([address] 10/06/2020    Gastroesophageal reflux disease without esophagitis 10/06/2020    Acute cystitis without hematuria 10/06/2020    CKD (chronic kidney disease) stage 4, GFR 15-29 ml/min ([address] 10/06/2020    Adenomatous polyp of sigmoid colon     Adenomatous polyp of transverse colon     Encephalopathy acute     Flash pulmonary edema ([address] 2020    Acute on chronic kidney failure ([address] 2020    Dysarthria     Chronic fatigue     Symptomatic anemia 2020    COPD exacerbation ([address] 2020    Anemia     AVM (arteriovenous malformation)     Gastrointestinal hemorrhage 2020    HOCM (hypertrophic obstructive cardiomyopathy) ([address] 2019    Hypoglycemia     SAÚL (acute kidney injury) ([address] 10/23/2019    Acute on chronic diastolic (congestive) heart failure (HCC) 10/13/2019    Chronic obstructive pulmonary disease with acute exacerbation ([facility] 10/12/2019    Hypertensive urgency 10/09/2019    Uncontrolled type 2 diabetes mellitus with hyperglycemia ([facility])     Acute on chronic diastolic heart failure ([address] 10/08/2019    CHF (congestive heart failure) ([facility])     PMB (postmenopausal bleeding)     Acute combined systolic and diastolic CHF, NYHA class 1 ([facility] 03/24/2019    Osteoarthritis of spine with radiculopathy, lumbar region 11/07/2018    Myalgia 05/11/2018    Intercostal neuropathy 05/11/2018    Diabetic asymmetric polyneuropathy ([facility] 04/11/2017    Cervical radicular pain 12/03/2016    Reactive depression 07/15/2016    Diabetic radiculopathy ([facility] 07/15/2016    Insomnia secondary to chronic pain 04/15/2016    Non-compliant patient NO showed FU 1/10/17 Dr Tracey Martinez 09/24/2015    Impaired mobility and activities of daily living 03/28/2015    Neck pain 03/04/2015    Artificial lens present 10/03/2014    Presbyopia 10/03/2014    Astigmatism, regular 10/03/2014    Cataract, nuclear sclerotic senile 10/03/2014    IDDM (insulin dependent diabetes mellitus) 10/03/2014    Regular astigmatism 10/03/2014    Nuclear senile cataract 10/03/2014    Memory deficit 06/04/2014    SOB (shortness of breath) on exertion 03/14/2014    Chest pain 03/14/2014    CTS (carpal tunnel syndrome) 02/09/2014    DJD (degenerative joint disease), lumbar 02/08/2014    Lumbosacral radiculopathy at S1 02/08/2014    DDD (degenerative disc disease), lumbar 02/08/2014    Dysphonia 02/08/2014    Insomnia 12/04/2013    Smoker 06/18/2013    Hypertension     Hyperlipidemia     Uncontrolled type 2 diabetes mellitus with complication, without long-term current use of insulin ([facility])     Fibromyalgia     Anxiety         Past Medical History:   Diagnosis Date    Anxiety     Asthma     dx 2019 / has smoked since age 11    CHF (congestive heart failure) (HCC)     Chronic back pain     Bilateral L5 S1 Radic on emg--surprisingly worse on the left than the right--pt's symptoms and her MRI show worse on the right    Chronic obstructive pulmonary disease with acute exacerbation (HCC) 10/12/2019    Depression     Fibromyalgia     Hyperlipidemia     meds > 8 yrs    Hypertension     meds > 45 yrs    On home O2     2l per n/c at bedtime mostly,     Osteoarthritis     Type II diabetes mellitus, uncontrolled (HCC)     hx > 8 yrs    Unspecified sleep apnea      Past Surgical History:   Procedure Laterality Date    BACK SURGERY  2017    lumbar disc    CARDIAC CATHETERIZATION  11/3/14     DR. MIRELES / no stents    COLONOSCOPY  08/29/2016    w/polypectomy     COLONOSCOPY N/A 9/29/2020    COLONOSCOPY WITH POLYPECTOMY performed by Jian Harris MD at [facility] N/A 10/7/2019    EUA HYSTEROSCOPY DILATATION AND CURETTAGE performed by Nilo Ayers DO at [facility]. [address], COLON, DIAGNOSTIC      EYE SURGERY      [place] with IOL OU / [id number]    umbilical hernia repair    RI ESOPHAGOGASTRODUODENOSCOPY TRANSORAL DIAGNOSTIC N/A 3/24/2017    EGD ESOPHAGOGASTRODUODENOSCOPY performed by Bill Iniguez MD at [place] [address] 2/8/2018    negative findings    RI REVISE MEDIAN N/CARPAL TUNNEL SURG Left 6/5/2017    LEFT  CARPAL TUNNEL RELEASE performed by Garry Alva MD at [facility],8+V/Y,WAFTR N/A 2/8/2018    TONSILLECTOMY      as child    UPPER GASTROINTESTINAL ENDOSCOPY  08/26/2016    w/bx     UPPER GASTROINTESTINAL ENDOSCOPY N/A 2/25/2020    EGD possible biopsy performed by Jian Harris MD at [address] [place] [address] 7/1/2020    EGD PUSH ENTEROSCOPY performed by Tara Short MD at [facility] Restrictions  Restrictions/Precautions: Contact Precautions; Fall Risk(ESBL in urine)    SUBJECTIVE   Subjective  Subjective: I want to go home. Pre-Session Pain Report  Pre Treatment Pain Screening  Pain at present: 0  Intervention List: Patient able to continue with treatment          Post-Session Pain Report  Pain Assessment  Pain Level: 0         OBJECTIVE   Orientation  Overall Orientation Status: Within Functional Limits    Bed mobility  Rolling to Left: Minimal assistance  Rolling to Right: Minimal assistance  Supine to Sit: Moderate assistance  Sit to Supine: Maximum assistance  Comment: vc's for technique and hand placement; improved initiation of task. Transfers  Sit to Stand: Moderate Assistance  Stand to sit: Moderate Assistance  Comment: vc's for hand placement; fair carryover. FF posture at [employer]. vc's and tc's to correct. Neuromuscular Education  Neuromuscular Comments: Standing with focus on posture and balance; side to to [facility] for pre-gait. 2-3 side steps. Exercises  Straight Leg Raise: x5  Knee Short Arc Quad: x10                    Activity Tolerance  Activity Tolerance: Patient limited by fatigue;Patient limited by endurance          ASSESSMENT   Assessment: Pt very fatigued with minimal activity. Pt has upbeat attitude and wants to get better. Slightly confused but following instruction. Discharge Recommendations:  Continue to assess pending progress, Patient would benefit from continued therapy after discharge    Goals  Long term goals  Long term goal 1: Bed mobility with Mod A  Long term goal 2: unsupported sitting with Min A x 8 min  Long term goal 3: functional reach in sitting <4 inch OOBOS  Long term goal 4: progress to functional transfers with 2 person Mod A  Long term goal 5: to be assessed for gait when appropriate  Patient Goals   Patient goals : \"I want to get better\"    PLAN    Safety Devices  Type of devices: All fall risk precautions in place; Bed alarm in place;Call light within reach; Left in bed     [facility] (6 CLICK) BASIC MOBILITY  AM-PAC Inpatient Mobility Raw Score : 9      Therapy Time   Individual   Time In 1454   Time Out 1517   Minutes 23      bm/Trsf - 10 mins  NMR - 10 mins  Therex 3 mins       Cherie Davis PTA, 03/31/21 at 3:26 PM       Definitions for assistance levels  Independent = pt does not require any physical supervision or assistance from another person for activity completion. Device may be needed.   Stand by assistance = pt requires verbal cues or instructions from another person, close to but not touching, to perform the activity  Minimal assistance= pt performs 75% or more of the activity; assistance is required to complete the activity  Moderate assistance= pt performs 50% of the activity; assistance is required to complete the activity  Maximal assistance = pt performs 25% of the activity; assistance is required to complete the activity  Dependent = pt requires total physical assistance to accomplish the task

## 2021-03-31 NOTE — PROGRESS NOTES
Neurology Follow up    SUBJECTIVE: Patient seen and examined for neurology follow-up for Hypoxic encephalopathy secondary to cardiopulmonary arrest with subsequent acute hypoxic respiratory failure requiring intubation and mechanical ventilation. Patient extubated on 3/26/21. Currently alert and oriented x3, no acute distress, cooperative. Patient with hypophonia secondary to hoarseness but this is overall improving. Denies headache or vision changes. No dysphagia or dysarthria. Patient remains nonfocal and without seizure activity.     Encephalopathy better    Current Facility-Administered Medications   Medication Dose Route Frequency Provider Last Rate Last Admin    carvedilol (COREG) tablet 50 mg  50 mg Oral BID Albaro Milner MD   50 mg at 03/31/21 0941    predniSONE (DELTASONE) tablet 30 mg  30 mg Oral Daily Radha Sepulveda MD   30 mg at 03/31/21 4663    Followed by   Kayleigh Moreau ON 4/3/2021] predniSONE (DELTASONE) tablet 20 mg  20 mg Oral Daily Radha Sepulveda MD        Followed by   Kayleigh Moreau ON 4/6/2021] predniSONE (DELTASONE) tablet 10 mg  10 mg Oral Daily Radha Sepulveda MD        verapamil (CALAN) tablet 120 mg  120 mg Oral BID Albaro Milner MD   120 mg at 03/30/21 2047    nystatin (MYCOSTATIN) 246772 UNIT/ML suspension 500,000 Units  5 mL Oral 4x Daily Shira MD Rich   500,000 Units at 03/31/21 0940    aspirin EC tablet 81 mg  81 mg Oral Daily Albaro Milner MD   81 mg at 03/31/21 0941    ipratropium-albuterol (DUONEB) nebulizer solution 1 ampule  1 ampule Inhalation TID [facility] DO   1 ampule at 03/31/21 1314    albuterol (PROVENTIL) nebulizer solution 2.5 mg  2.5 mg Nebulization Q2H PRN [facility] DO   2.5 mg at 03/31/21 0911    insulin glargine (LANTUS) injection vial 20 Units  20 Units Subcutaneous Nightly Lui Salas MD   20 Units at 03/30/21 2047    insulin lispro (HUMALOG) injection vial 0-12 Units  0-12 Units Subcutaneous TID WC Lui Salas MD   4 Units at 03/31/21 1212    insulin lispro (HUMALOG) injection vial 0-6 Units  0-6 Units Subcutaneous Nightly Zay Mazariegos MD   6 Units at 03/30/21 2049    sodium chloride flush 0.9 % injection 10 mL  10 mL Intravenous 2 times per day Vince Sprague MD   10 mL at 03/31/21 0944    sodium chloride flush 0.9 % injection 10 mL  10 mL Intravenous PRN Vince Sprague MD        cloNIDine (CATAPRES) tablet 0.2 mg  0.2 mg Oral BID Vince Sprague MD   0.2 mg at 03/31/21 0941    glucose (GLUTOSE) 40 % oral gel 15 g  15 g Oral PRN Vince Sprague MD        dextrose 50 % IV solution  12.5 g Intravenous PRN Vince Sprague MD        glucagon (rDNA) injection 1 mg  1 mg Intramuscular PRN Vince Sprague MD        dextrose 5 % solution  100 mL/hr Intravenous PRN Vince Sprague MD        meropenem (MERREM) 1,000 mg in sodium chloride 0.9 % 100 mL IVPB (mini-bag)  1,000 mg Intravenous Q12H Soledad Mcmahon MD   Stopped at 03/31/21 1100    0.9 % sodium chloride infusion   Intravenous PRN Katherin Inoue, DO        nitroglycerin (NITRO-BID) 2 % ointment 1 inch  1 inch Topical 4 times per day Teddy Forte MD   1 inch at 03/31/21 0554    pantoprazole (PROTONIX) injection 40 mg  40 mg Intravenous Daily Vince Sprague MD   40 mg at 03/31/21 0940    And    sodium chloride (PF) 0.9 % injection 10 mL  10 mL Intravenous Daily Vince Sprague MD   10 mL at 03/31/21 0944    heparin (porcine) injection 5,000 Units  5,000 Units Subcutaneous 3 times per day Vince Sprague MD   5,000 Units at 03/31/21 0554    docusate (COLACE) 50 MG/5ML liquid 100 mg  100 mg Oral BID Vince Sprague MD   100 mg at 03/31/21 0940    polyethylene glycol (GLYCOLAX) packet 17 g  17 g Oral Daily Vince Sprague MD   17 g at 03/31/21 0941    budesonide (PULMICORT) nebulizer suspension 500 mcg  0.5 mg Nebulization BID Vince Sprague MD   500 mcg at 03/30/21 0723    hydrALAZINE (APRESOLINE) injection 10 mg  10 mg Intravenous Q4H PRN Beni D Sedar, DO   10 mg at 03/26/21 2321    Or    hydrALAZINE (APRESOLINE) injection 20 mg  20 mg Intravenous Q4H PRN Bernardo Jarrett Sedar, DO   20 mg at 03/25/21 2051    labetalol (NORMODYNE;TRANDATE) injection 20 mg  20 mg Intravenous Q4H PRN Hailey Gemma, DO   20 mg at 03/24/21 1227    labetalol (NORMODYNE;TRANDATE) injection 40 mg  40 mg Intravenous Q4H PRN Hailey Gemma, DO   40 mg at 03/27/21 1302    promethazine (PHENERGAN) tablet 12.5 mg  12.5 mg Oral Q6H PRN Erica Carey MD        Or    ondansetron (ZOFRAN) injection 4 mg  4 mg Intravenous Q6H PRN Erica Carey MD        sodium chloride flush 0.9 % injection 10 mL  10 mL Intravenous 2 times per day Erica Carey MD   10 mL at 03/31/21 0945    sodium chloride flush 0.9 % injection 10 mL  10 mL Intravenous PRN Erica Carey MD        acetaminophen (TYLENOL) tablet 650 mg  650 mg Oral Q6H PRN Erica Carey MD   650 mg at 03/25/21 2153    Or    acetaminophen (TYLENOL) suppository 650 mg  650 mg Rectal Q6H PRN Erica Carey MD   650 mg at 03/25/21 0153       PHYSICAL EXAM:    BP (!) 161/71   Pulse 79   Temp 98.2 °F (36.8 °C) (Oral)   Resp 18   Ht 4' 11\" (1.499 m)   Wt 147 lb 6.4 oz (66.9 kg)   LMP  (LMP Unknown)   SpO2 100%   BMI 29.77 kg/m²   General Appearance:      Skin:  normal  CVS - Normal sounds, No murmurs , No carotid Bruits  RS -CTA  Abdomen Soft, BS present  Review of Systems   Unable to perform ROS: Mental status change   Constitutional: Negative for chills, fever and unexpected weight change. HENT: Positive for voice change. Negative for congestion and trouble swallowing. Eyes: Negative for photophobia and visual disturbance. Respiratory: Negative for chest tightness, shortness of breath and wheezing. Cardiovascular: Negative for chest pain and leg swelling. Gastrointestinal: Negative for diarrhea, nausea and vomiting. Endocrine: Negative. Genitourinary: Negative.     Musculoskeletal: Negative for back pain, gait problem and neck stiffness. Skin: Negative for rash. Allergic/Immunologic: Negative. Neurological: Negative for dizziness, tremors, seizures, syncope, facial asymmetry, speech difficulty, weakness, light-headedness, numbness and headaches. Hematological: Negative. Psychiatric/Behavioral: Negative for confusion, hallucinations and sleep disturbance. no new changes  Mental Status Exam:             She is much more alert, awake, follows commands and does not appear to be in any acute distress. Funduscopic Exam: Deferred    Cranial Nerves: Face does appear again to be symmetric V1 V2 and V3 are intact good shoulder shrug present bilaterally. Cranial nerve III           Pupils:  equal, round, reactive to light      Cranial nerves III, IV, VI           Extraocular Movements: intact        Motor: Patient is able to lift all 4 extremities antigravity she is able to perform lifting both arms and legs off bed for greater than 5 seconds.           Sensory: Unable to perform        Pinprick             Right Upper Extremity:  normal             Left Upper Extremity:  normal             Right Lower Extremity:  normal             Left Lower Extremity:  normal           Vibration                         Touch            Proprioception                 Coordination:           Finger/Nose   Right:  normal              Left:  normal          Heel-Knee-Shin                Right:  normal              Left:  normal          Rapid Alternating Movements              Right:  normal              Left:  normal          Gait:                       Casual:  normal                         Romberg:  normal            Reflexes:             Deep Tendon Reflexes:             Reflexes are 2 +             Plantar response:                Right:  downgoing               Left:  downgoing    Vascular:  Cardiac Exam:  normal         Echocardiogram Complete 2d With Doppler With Color    Result Date: 3/19/2021  Transthoracic Echocardiography Report (TTE)  Demographics  Patient Name   Yasmin Velázquez  Gender              Female                 M  Patient Number [id number]        Race                Black                                 Ethnicity  Visit Number   [id number]       Room Number         IC05  Corporate ID                   Date of Study       03/19/2021  Accession      [id number]      Referring Physician  Number  Date of Birth  1945      Sonographer         Tenzin Markham Tiffanieridge Beltran                                                     KARUNAN  Age            67 year(s)      [place] [address] Cardiology                                 Physician           Mesha Jacobs MD Procedure Type of Study  TTE procedure:ECHO COMPLETE 2D W/DOP W/COLOR. Procedure Date Date: 03/19/2021 Start: 09:15 AM Study Location: Portable Technical Quality: Good visualization Indications:Cardiac arrest. Patient Status: Routine Weight: 143 pounds BP: 89/48 mmHg Allergies   - Other allergy:(Darvon). Conclusions  Summary  Mitral annular calcification is present. 1-2+ MR  Normal tricuspid valve structure and function. 1+ TR  RVSP 31 mmHg  severe CLVH  LVEF 50%  E/A flow reversal noted. Suggestive of diastolic dysfunction. Signature  ----------------------------------------------------------------  Electronically signed by Mesha Jacobs MD(Interpreting  physician) on 03/19/2021 02:13 PM  ----------------------------------------------------------------  Findings Left Ventricle severe CLVH LVEF 50% E/A flow reversal noted. Suggestive of diastolic dysfunction. Right Ventricle Normal right ventricle structure and function. Normal right ventricle systolic pressure. Left Atrium Moderately dilated left atrium. Right Atrium Normal right atrium. Mitral Valve Mitral annular calcification is present. 1-2+ MR Tricuspid Valve Normal tricuspid valve structure and function. 1+ TR RVSP 31 mmHg Aortic Valve Normal aortic valve structure and function.  Pulmonic Valve The pulmonic valve was VTI: 11.64 cm Structures  Left Atrium  LA Dimension: 3.04 cm                 LA Area: 18.08 cm^2  LA/Aorta: 1.09  LA Volume/Index: 49.97 ml  Left Ventricle  Diastolic Dimension: 1.62 cm         Systolic Dimension: 6.64 cm  Septum Diastolic: 6.16 cm            Septum Systolic: 1.19 cm  PW Diastolic: 2.35 cm                PW Systolic: 7.45 cm                                       FS: 53.9 %  LV EDV/LV EDV Index: 70.29 ml        LV ESV/LV ESV Index: 10.47 ml  EF Calculated: 85.1 %  LVOT Diameter: 1.98 cm  Right Ventricle  Diastolic Dimension: 0.24 cm                                    RV Systolic Pressure: 13.24 mmHg Aorta/ Miscellaneous Aorta  Aortic Root: 2.8 cm  LVOT Diameter: 1.98 cm    Xr Chest Portable    Result Date: 3/19/2021  EXAMINATION: XR CHEST PORTABLE CLINICAL HISTORY: INTUBATION COMPARISONS: OCTOBER 9, 2020 FINDINGS: Endotracheal tube has migrated 3 cm inferiorly, just lying cephalad to mojgan. Nasogastric tube courses beneath diaphragm. Osseous structures intact. Cardiopericardial silhouette normal. Aorta calcified. Ill-defined area increased opacity right  lung base. RIGHT LOWER LUNG SUBSEGMENTAL ATELECTASIS/PNEUMONIA. INTERVAL INFERIOR MIGRATION ENDOTRACHEAL TUBE 3 CM WITH TIP NOW JUST PROXIMAL TO MOJGAN. MAY WISH TO RETRACT ENDOTRACHEAL TUBE 2 CM. Xr Chest Portable    Result Date: 3/19/2021  EXAMINATION: XR CHEST PORTABLE CLINICAL HISTORY: RESPIRATORY FAILURE COMPARISONS: MARCH 16, 2021 FINDINGS: Endotracheal tube tip 2.3 cm superior to mojgan. Nasogastric tube courses beneath diaphragm area osseous structures intact. Ill-defined areas increase opacity at lung bases. Cardiopericardial silhouette normal.     BIBASILAR SUBSEGMENTAL ATELECTASIS/PNEUMONIA IN THIS PARTIALLY EXPIRATORY CHEST RADIOGRAPH.       Recent Labs     03/29/21  0513 03/30/21  0533 03/31/21  0600   WBC 11.5* 14.2* 19.3*   HGB 9.1* 9.2* 9.1*   * 448* 484*     Recent Labs     03/29/21  0513 03/30/21  0537 03/31/21  0600  138 138   K 4.5 4.7 4.2    101 100   CO2 24 26 26   BUN 40* 39* 46*   CREATININE 1.30* 1.25* 1.44*   GLUCOSE 249* 185* 76     Recent Labs     03/29/21  0513   BILITOT <0.2   ALKPHOS 99   AST 21   ALT 28     Lab Results   Component Value Date    PROTIME 14.4 03/29/2021    INR 1.1 03/29/2021     No results found for: LITHIUM, DILFRTOT, VALPROATE    ASSESSMENT AND PLAN  Encephalopathy secondary to cardiorespiratory arrest.  Patient may have suffered some hypoxemic damage though this very difficult to certain as patient is now extubated and off sedation   and she does appear to be improving each day. .  We will keep an eye on this repeat CT scan did not anything significant as far as mass lesions or structural lesions no early CT sign changes seen to suggest any acute ischemia and no evidence of any gray-white matter changes to suggest severe hypoxic ischemic changes. The patient does appear to be tolerating diet at this point which we will advance as tolerated. Patient may benefit from rehabilitation stay with cognitive and speech therapy. Hypoxic encephalopathy secondary to cardiopulmonary arrest with subsequent acute hypoxic respiratory failure requiring intubation and mechanical ventilation. Patient extubated on 3/26/21  Acute on chronic anemia with hemoglobin of 5.8 on arrival status post transfusion  Patient remains hypertensive with history of hypertensive cardiomyopathy. Patient with known moderate right renal artery stenosis attributing to resistant hypertension  DNR CCA, palliative care on consult  Encephalopathy has resolved. Nonfocal.  No seizure activity reported. Continue to follow    Encephalopathy resolved  Patient also being treated for ESBL UTI  Nonfocal, continues to have generalized weakness    Discharge planning in progress for home with nursing home health care and palliative care to follow  Okay to DC from neurology standpoint when medically stable.   Will follow at a distance    I have personally performed a face to face diagnostic evaluation on this patient, reviewed all data and investigations, and am the sole provider of all clinical decisions on the neurological status of this patient. encephalopathy better but fluctuates , will need SNF      Mehul Freire MD, Micaela Lopez, American Board of Psychiatry & Neurology  Board Certified in Vascular Neurology  Board Certified in Neuromuscular Medicine  Certified in . [place]

## 2021-03-31 NOTE — PROGRESS NOTES
Physical Therapy Missed Treatment   Facility/Department: [facility] MED SURG L408/D291-58    NAME: Hira Stafford    : 1945 (75 y.o.)  MRN: [id number]    Account: [de-identified]  Gender: female    Chart reviewed, attempted PT at 55. Patient unavailable 2° to:    [] Hold per nsg request    [x] Pt declined pt visiting with family member, stating she is going to eat soon. Encouragement and education given pt still unwilling to work with therapy at this time. [x] Nsg notified   [] Other notified    [] Pt. . off floor for test/procedure. [] Pt. Unavailable       Will attempt PT treatment again at earliest convenience.       Electronically signed by Mackenzie Orona PTA on 3/31/21 at 11:45 AM EDT

## 2021-03-31 NOTE — PROGRESS NOTES
Progress Note  Patient: Mickey Mixon  Unit/Bed: T041/P541-98  YOB: 1945  MRN: [id number]  Acct: [de-identified]   Admitting Diagnosis: Cardiac arrest [facility]) [I46.9]  Admit Date:  3/18/2021  Hospital Day: 15    Chief Complaint: CPA    Histories:  Past Medical History:   Diagnosis Date    Anxiety     Asthma     dx 2019 / has smoked since age 14    CHF (congestive heart failure) (HCC)     Chronic back pain     Bilateral L5 S1 Radic on emg--surprisingly worse on the left than the right--pt's symptoms and her MRI show worse on the right    Chronic obstructive pulmonary disease with acute exacerbation ([facility] [address] 10/12/2019    Depression     Fibromyalgia     Hyperlipidemia     meds > 8 yrs    Hypertension     meds > 45 yrs    On home O2     2l per n/c at bedtime mostly,     Osteoarthritis     Type II diabetes mellitus, uncontrolled ([facility] [address]     hx > 8 yrs    Unspecified sleep apnea      Past Surgical History:   Procedure Laterality Date    BACK SURGERY  2017    lumbar disc    CARDIAC CATHETERIZATION  11/3/14     DR. MIRELES / no stents    COLONOSCOPY  08/29/2016    w/polypectomy     COLONOSCOPY N/A 9/29/2020    COLONOSCOPY WITH POLYPECTOMY performed by Jian Harris MD at [facility] N/A 10/7/2019    EUA HYSTEROSCOPY DILATATION AND CURETTAGE performed by Nilo Ayers DO at [facility]. [address], COLON, DIAGNOSTIC      EYE SURGERY      [place] with IOL OU / [address]    umbilical hernia repair    AK ESOPHAGOGASTRODUODENOSCOPY TRANSORAL DIAGNOSTIC N/A 3/24/2017    EGD ESOPHAGOGASTRODUODENOSCOPY performed by Bill Iniguez MD at [facility] 2/8/2018    negative findings    AK REVISE MEDIAN N/CARPAL TUNNEL SURG Left 6/5/2017    LEFT  CARPAL TUNNEL RELEASE performed by Garry Alva MD at [facility],6+N/T,DPUKU N/A 2/8/2018    TONSILLECTOMY      as child  UPPER GASTROINTESTINAL ENDOSCOPY  2016    w/bx     UPPER GASTROINTESTINAL ENDOSCOPY N/A 2020    EGD possible biopsy performed by Miles Graff MD at [address] [place] [address] N/A 2020    EGD PUSH ENTEROSCOPY performed by Lokesh Joyce MD at [facility]     Family History   Problem Relation Age of Onset    Heart Disease Father         cardiac bypass    Arthritis Father     Arthritis Mother     Other Mother          at age 80    Other Sister         nuknown health hx    No Known Problems Daughter     Stroke Son      Social History     Socioeconomic History    Marital status:      Spouse name: Varsha Pradhan Number of children: 2    Years of education: 15    Highest education level: High school graduate   Occupational History    Occupation: Retired-   Social Needs    Financial resource strain: Not hard at all   [place] insecurity     Worry: Never true     Inability: Never true    Transportation needs     Medical: No     Non-medical: No   Tobacco Use    Smoking status: Former Smoker     Packs/day: 1.00     Years: 59.00     Pack years: 59.00     Types: Cigarettes     Start date: 2017    Smokeless tobacco: Never Used    Tobacco comment: quit 2-3 weeks ago    Substance and Sexual Activity    Alcohol use: Not Currently    Drug use: No    Sexual activity: Yes     Partners: Male   Lifestyle    Physical activity     Days per week: 0 days     Minutes per session: 0 min    Stress: Only a little   Relationships    Social connections     Talks on phone: More than three times a week     Gets together: More than three times a week     Attends Bahai service: 1 to 4 times per year     Active member of club or organization: No     Attends meetings of clubs or organizations: Never     Relationship status: Living with partner   [employer] Intimate partner violence     Fear of current or ex partner: No     Emotionally abused:  No Physically abused: No     Forced sexual activity: No   Other Topics Concern    None   Social History Narrative    Grew up in [place]     Lives With:  [id number] Spouse    Type of Home: House    Home Layout: Two level, Performs ADL's on one level    Home Access: Stairs to enter with rails    Entrance Stairs - Number of Steps: 4    Bathroom Shower/Tub: Tub/Shower unit, Doors    Bathroom Equipment: Shower chair, Grab bars in [facility]: Rolling walker, Cane, Oxygen(uses her O2 very seldom)    ADL Assistance: Needs assistance    Homemaking Assistance: Needs assistance    Homemaking Responsibilities: No(spouse performs)    Ambulation Assistance: Independent    Transfer Assistance: Independent    Active : No    Occupation: Retired    Type of occupation: worked in [employer]: patient enjoys televsision       Subjective/HPI no events overnight. No cp no sob attempting to get out of bed all night. Lap belt for protection. EKG: ST 88      Review of Systems:   Review of Systems   Constitutional: Negative. Negative for diaphoresis and fatigue. HENT: Positive for sore throat. Eyes: Negative. Respiratory: Negative. Negative for cough, chest tightness, shortness of breath, wheezing and stridor. Cardiovascular: Negative. Negative for chest pain, palpitations and leg swelling. Gastrointestinal: Negative. Negative for blood in stool and nausea. Genitourinary: Negative. Musculoskeletal: Negative. Skin: Negative. Neurological: Positive for weakness. Negative for dizziness, syncope and light-headedness. Hematological: Negative. Psychiatric/Behavioral: Negative. Physical Examination:    BP (!) 152/99   Pulse 94   Temp 98.6 °F (37 °C) (Oral)   Resp 18   Ht 4' 11\" (1.499 m)   Wt 147 lb 6.4 oz (66.9 kg)   LMP  (LMP Unknown)   SpO2 100%   BMI 29.77 kg/m²    Physical Exam   Constitutional: She appears healthy. No distress.    HENT:   Normal cephalic and Atraumatic   Eyes: Pupils are equal, round, and reactive to light. Neck: Normal range of motion and thyroid normal. Neck supple. No JVD present. No neck adenopathy. No thyromegaly present. Cardiovascular: Normal rate, regular rhythm, intact distal pulses and normal pulses. Murmur heard. Pulmonary/Chest: Effort normal and breath sounds normal. She has no wheezes. She has no rales. She exhibits no tenderness. Abdominal: Soft. Bowel sounds are normal. There is no abdominal tenderness. Musculoskeletal: Normal range of motion. General: No tenderness or edema. Neurological: She is alert and oriented to person, place, and time. Skin: Skin is warm. No cyanosis. Nails show no clubbing.        LABS:  CBC:   Lab Results   Component Value Date    WBC 19.3 03/31/2021    RBC 3.10 03/31/2021    HGB 9.1 03/31/2021    HCT 27.5 03/31/2021    MCV 88.6 03/31/2021    MCH 29.3 03/31/2021    MCHC 33.1 03/31/2021    RDW 16.5 03/31/2021     03/31/2021    MPV 8.8 08/01/2015     CBC with Differential:    Lab Results   Component Value Date    WBC 19.3 03/31/2021    RBC 3.10 03/31/2021    HGB 9.1 03/31/2021    HCT 27.5 03/31/2021     03/31/2021    MCV 88.6 03/31/2021    MCH 29.3 03/31/2021    MCHC 33.1 03/31/2021    RDW 16.5 03/31/2021    NRBC 2 03/31/2021    BANDSPCT 1 03/31/2021    LYMPHOPCT 12.0 03/31/2021    MONOPCT 3.7 03/31/2021    BASOPCT 0.3 03/31/2021    MONOSABS 0.8 03/31/2021    LYMPHSABS 2.3 03/31/2021    EOSABS 0.0 03/31/2021    BASOSABS 0.0 03/31/2021     CMP:    Lab Results   Component Value Date     03/31/2021    K 4.2 03/31/2021    K 4.4 10/19/2020     03/31/2021    CO2 26 03/31/2021    BUN 46 03/31/2021    CREATININE 1.44 03/31/2021    GFRAA 42.7 03/31/2021    LABGLOM 35.3 03/31/2021    GLUCOSE 76 03/31/2021    PROT 5.9 03/29/2021    LABALBU 3.0 03/31/2021    CALCIUM 9.6 03/31/2021    BILITOT <0.2 03/29/2021    ALKPHOS 99 03/29/2021    AST 21 03/29/2021    ALT 28 03/29/2021 BMP:    Lab Results   Component Value Date     03/31/2021    K 4.2 03/31/2021    K 4.4 10/19/2020     03/31/2021    CO2 26 03/31/2021    BUN 46 03/31/2021    LABALBU 3.0 03/31/2021    CREATININE 1.44 03/31/2021    CALCIUM 9.6 03/31/2021    GFRAA 42.7 03/31/2021    LABGLOM 35.3 03/31/2021    GLUCOSE 76 03/31/2021     Magnesium:    Lab Results   Component Value Date    MG 1.7 03/31/2021     Troponin:    Lab Results   Component Value Date    TROPONINI 0.056 03/19/2021        Active Hospital Problems    Diagnosis Date Noted    Goals of care, counseling/discussion [Z71.89]      Priority: Low    Bradycardia [R00.1]      Priority: Low    Moderate hypoxic-ischemic encephalopathy [P91.62]      Priority: Low    Acute respiratory failure with hypoxia (HCC) [J96.01]      Priority: Low    Cardiac arrest ([facility] [address] [I46.9] 03/18/2021     Priority: Low    Encephalopathy acute [G93.40]      Priority: Low        Assessment/Plan:  1. CPA   2. Respiratory failure-extubated. Stable  3. Aspiration PNA - on ABX  4. Encephalopathy-wax and wanes  5. CAD - LAD mild - will resume low dose ASA  6. HTN - still labile. Continue Verapamil to 120 bid. Advance Coreg to 50 bid   7. LVEF 55%  8. Echo Severe LVH. 1-2+ MR RVSP 31 mmHg. LVEF 50%  9. History of HCM - need to Keep HR close to 60s. Today while asleep HR 92. I have advanced Coreg to 50 bid. 10. SAÚL - Nephrology following- stable. She has known moderate Right LYNDON from [address] 6/30/2020 - we will address this by CTA in future as out pt. 11. Accelerated HTN- overall better  12. PT OT  13. Dr. Rachael Gutierrez will be covering here on as I will be out.        Electronically signed by Sabra Cantor MD on 3/31/2021 at 7:56 AM

## 2021-03-31 NOTE — PROGRESS NOTES
Infectious Disease     Patient Name: Jessica Gutierrez  Date: 3/31/2021  YOB: 1945  Medical Record Number: [id number]        ESBL E. coli UTI       Organism Abnormal  03/23/2021 10:06 PM Alen Linares   Escherichia coli ESBL    Urine Culture, Routine Abnormal  03/23/2021 10:06 PM  - [facility]   >100,000 CFU/ml   CONTACT PRECAUTIONS INDICATED   Carbapenem antibiotics are the best option for infections caused   by ESBL producing organisms.  Other antibiotic classes are   likely to result in treatment failure, even for organisms   demonstrating in vitro susceptibility. Testing Performed By    Jose Sandoval Name Director Address Valid Date Range   [address] [facility] Abby Medina MD [address] [facility] [id number] 07/12/18 0959-Present   Narrative  Performed by: Alen Linares  ORDER#: [id number]                          ORDERED BY: Josiah Mao   SOURCE: Urine Clean Catch                  COLLECTED:  03/23/21 22:06   [facility] AT [facility]. :                      RECEIVED :  03/23/21 22:06   CALL  [facility]  ICL tel. [phone],   ESBL called & read back by Aracelis Feliciano, 03/26/2021 07:26, by INOCENCIO LOVING   Susceptibility    Escherichia coli (esbl) (1)    Antibiotic Interpretation KAYLEE Status    amoxicillin-clavulanate Resistant 4 mcg/mL     ampicillin Resistant >=32 mcg/mL     ceFAZolin Resistant >=64 mcg/mL     cefepime Resistant 2 mcg/mL     cefTRIAXone Resistant >=64 mcg/mL     ciprofloxacin Sensitive <=0.25 mcg/mL     gentamicin Sensitive <=1 mcg/mL     imipenem Sensitive <=0.25 mcg/mL     nitrofurantoin Sensitive <=16 mcg/mL     piperacillin-tazobactam Resistant <=4 mcg/mL     trimethoprim-sulfamethoxazole Sensitive <=20 mcg/mL         Chest x-rays show improvement diminishing infiltrate in left lung but still persistent areas that appears to be consistent with effusion      Review of Systems   Constitutional: Negative. Respiratory: Negative. Cardiovascular: Negative. Gastrointestinal: Negative. Physical Exam   Constitutional: No distress. Cardiovascular: Normal heart sounds. No murmur heard. Pulmonary/Chest: Effort normal and breath sounds normal. No respiratory distress. She has no wheezes. She has no rales. She exhibits no tenderness. Comfortable wearing nasal cannula oxygen talking in full sentences   Abdominal: Soft. She exhibits no distension and no mass. There is no abdominal tenderness. There is no rebound and no guarding. Genitourinary:    Genitourinary Comments: Sanchez catheter in clear urine     Skin: She is not diaphoretic. Blood pressure (!) 102/58, pulse 98, temperature 98.6 °F (37 °C), temperature source Oral, resp. rate 18, height 4' 11\" (1.499 m), weight 147 lb 6.4 oz (66.9 kg), SpO2 100 %, not currently breastfeeding.       .   Lab Results   Component Value Date    WBC 19.3 (H) 03/31/2021    HGB 9.1 (L) 03/31/2021    HCT 27.5 (L) 03/31/2021    MCV 88.6 03/31/2021     (H) 03/31/2021     Lab Results   Component Value Date     03/31/2021    K 4.2 03/31/2021    K 4.4 10/19/2020     03/31/2021    CO2 26 03/31/2021    BUN 46 03/31/2021    CREATININE 1.44 03/31/2021    GLUCOSE 76 03/31/2021    CALCIUM 9.6 03/31/2021            ASSESSMENT:  PLAN:    ESBL E. coli UTI   Meropenem

## 2021-03-31 NOTE — CARE COORDINATION
Met with patient. Definition of pneumonia discussed. Causes of different types reviewed. Symptoms discussed and pt does understand that she may have some or all of these symptoms. Testing to diagnose pneumonia reviewed as well as possible treatments. Importance of avoiding infections discussed including: taking medication as directed, washing hands, disposing of used tissues, getting the pneumonia and flu vaccines, and avoiding others who are ill. Importance of not smoking and to avoid others who may be smoking around patient stressed. Pneumonia Zones also reviewed. \"Green\" zone is the goal, \"Yellow\" zone means to call the doctor, and \"Red\" zone means to call the doctor ASAP or call 911. Copies of Pneumonia booklet and Zone Pamphlet given to patient. Pt declines having further questions at this time.     Electronically signed by Mckenna Valladares RN on 3/31/2021 at 2:18 PM

## 2021-03-31 NOTE — PROGRESS NOTES
[facility] Respiratory Therapy Evaluation   Current Order:  Duoneb TID      Home Regimen: TID per patient      Ordering Physician: Osmany  Re-evaluation Date:  4/3     Diagnosis: Cardiac arrest      Patient Status: Stable / Unstable + Physician notified    The following MDI Criteria must be met in order to convert aerosol to MDI with spacer. If unable to meet, MDI will be converted to aerosol:  []  Patient able to demonstrate the ability to use MDI effectively  []  Patient alert and cooperative  []  Patient able to take deep breath with 5-10 second hold  []  Medication(s) available in this delivery method   []  Peak flow greater than or equal to 200 ml/min            Current Order Substituted To  (same drug, same frequency)   Aerosol to MDI [] Albuterol Sulfate 0.083% unit dose by aerosol Albuterol Sulfate MDI 2 puffs by inhalation with spacer    [] Levalbuterol 1.25 mg unit dose by aerosol Levalbuterol MDI 2 puffs by inhalation with spacer    [] Levalbuterol 0.63 mg unit dose by aerosol Levalbuterol MDI 2 puffs by inhalation with spacer    [] Ipratropium Bromide 0.02% unit dose by aerosol Ipratropium Bromide MDI 2 puffs by inhalation with spacer    [] Duoneb (Ipratropium + Albuterol) unit dose by aerosol Ipratropium MDI + Albuterol MDI 2 puffs by inhalation w/spacer   MDI to Aerosol [] Albuterol Sulfate MDI Albuterol Sulfate 0.083% unit dose by aerosol    [] Levalbuterol MDI 2 puffs by inhalation Levalbuterol 1.25 mg unit dose by aerosol    [] Ipratropium Bromide MDI by inhalation Ipratropium Bromide 0.02% unit dose by aerosol    [] Combivent (Ipratropium + Albuterol) MDI by inhalation Duoneb (Ipratropium + Albuterol) unit dose by aerosol       Treatment Assessment [Frequency/Schedule]:  Change frequency to: ____No change______________________________________________per Protocol, P&T, MEC      Points 0 1 2 3 4   Pulmonary Status  Non-Smoker  []   Smoking history   < 20 pack years  []   Smoking history  ?  21 pack years  []   Pulmonary Disorder  (acute or chronic)  [x]   Severe or Chronic w/ Exacerbation  []     Surgical Status No [x]   Surgeries     General []   Surgery Lower []   Abdominal Thoracic or []   Upper Abdominal Thoracic with  PulmonaryDisorder  []     Chest X-ray Clear/Not  Ordered     [x]  Chronic Changes  Results Pending  []  Infiltrates, atelectasis, pleural effusion, or edema  []  Infiltrates in more than one lobe []  Infiltrate + Atelectasis, &/or pleural effusion  []    Respiratory Pattern Regular,  RR = 12-20 [x]  Increased,  RR = 21-25 []  MENDEZ, irregular,  or RR = 26-30 []  Decreased FEV1  or RR = 31-35 []  Severe SOB, use  of accessory muscles, or RR ? 35  []    Mental Status Alert, oriented,  Cooperative [x]  Confused but Follows commands []  Lethargic or unable to follow commands []  Obtunded  []  Comatose  []    Breath Sounds Clear to  auscultation  []  Decreased unilaterally or  in bases only []  Decreased  bilaterally  [x]  Crackles or intermittent wheezes []  Wheezes []    Cough Strong, Spontan., & nonproductive [x]  Strong,  spontaneous, &  productive []  Weak,  Nonproductive []  Weak, productive or  with wheezes []  No spontaneous  cough or may require suctioning []    Level of Activity Ambulatory []  Ambulatory w/ Assist  [x]  Non-ambulatory []  Paraplegic []  Quadriplegic []    Total    Score:___6____     Triage Score:____4____      Tri       Triage:     1. (>20) Freq: Q3    2. (16-20) Freq: Q4   3. (11-15) Freq: QID & Albuterol Q2 PRN    4. (6-10) Freq: TID & Albuterol Q2 PRN    5. (0-5) Freq Q4prn

## 2021-03-31 NOTE — PROGRESS NOTES
Palliative Care Progress Note  Patient: Zadie Najjar  Gender: female  YOB: 1945  Unit/Bed: X594/J469-62  CodeStatus: Limited  Inpatient Treatment Team: Treatment Team: Attending Provider: Erica Carey MD; Consulting Physician: Fátima Dey MD; Consulting Physician: Hailey Mathias DO; Consulting Physician: Shagufta Betancourt MD; Utilization Reviewer: Ruben Moyer RN; Consulting Physician: Fannie Gaines MD; Utilization Reviewer: Tree Cabral RN; Consulting Physician: Inder Nguyen MD; Patient Care Tech: Thermon Hamburger; : Orville Nunez RN; Tech: [employer]; Registered Nurse: Sameer Mccray RN; : Leah Pozo; Consulting Physician: Gayla Gamez MD  Admit Date:  3/18/2021    Chief Complaint: fatigue    History of Presenting Illness:      Zadie Najjar is a 67 y.o. female on hospital day 15 with a history of  Cardiac arrest. PMH is significant for nonobstructive CAD, diastolic HF, HOCM, HTN, IDDM2, CKD3, anemia, prolonged hospitalization in 21/7857 complicated by cardiac arrest s/p ROSC,requiring transvenous pacing due to persistent bradycardia and hypotension despite pressor support, SAÚL, and septic shock. Patient seen and examined at bedside for goals of care discussion, code status discussion, family support, and symptom management. She presented to the ER via EMS after becoming unresponsive at home after complaining of feeling weak and tired and taking an extra torsemide. Upon arrival to the ER patient was bradycardic and hypotensive with agonal respirations. She was intubated in the ER. She was treated with dopamine and Levophed for her persistent bradycardia. She went into cardiac arrest. CPR was initiated and lasted for 16 minutes per report. Patient treated with one unit of PRBC for anemia and positive FOBT. S/P extubation on 3/26/21. She is now on the medical floor. She is scheduled to have MBS done today.  Patient and her  met with hospice yesterday to gather information. No decision has been made at this time for patient to be admitted to hospice care. Patient observed laying in bed. She is alert and oriented x3. Her speech is barely audible secondary to hoarseness. She appears to have oral candidiasis. Will start her on nystatin swish and swallow. Reports that she is just \"very tired\". Denies pain or discomfort. No GI or  complaints voiced. Tells me that she lives at home with her . Gives me permission to discuss her care with her . 3/30/21- Patient observed laying in bed. She is alert and oriented x3. Her speech volume is improving. She is still reporting hoarseness. Reports that she is just \"very tired\". Denies pain or discomfort. No GI or  complaints voiced. She is on contact precaution for UTI with ESBL. Patient's  is at bedside. We discussed Pall care versus hospice care. Stated that he would like for her to return home with home care and palliative care service. 3/31/21- Patient observed laying in bed. She is alert and oriented x3. Her speech volume is improving. She is still reporting hoarseness. Reports that she is just \"very tired\". Denies pain or discomfort. No GI or  complaints voiced. She is on contact precaution for UTI with ESBL. Patient's  is at bedside. Discussed with him his decision to cancel [place] [id number] service. He stated that he misunderstood the question from case management. He is in agreement for [place] [id number] through [facility].  informed that both the patient and her  are in agreement for [place] [id number] and [id number] [facility] service at home. Review of Systems:       Review of Systems   Constitutional: Positive for activity change, appetite change and fatigue. Negative for unexpected weight change. HENT: Positive for trouble swallowing and voice change. Eyes: Negative. Respiratory: Negative for shortness of breath and wheezing. Cardiovascular: Negative for leg swelling. Gastrointestinal: Negative for constipation, diarrhea and nausea. Endocrine: Negative. Genitourinary: Negative. Musculoskeletal: Positive for gait problem. Skin: Negative for pallor. Allergic/Immunologic: Negative. Neurological: Positive for weakness. Negative for dizziness. Psychiatric/Behavioral: Negative for confusion. Physical Examination:       BP (!) 161/71   Pulse 88   Temp 98.2 °F (36.8 °C) (Oral)   Resp 18   Ht 4' 11\" (1.499 m)   Wt 147 lb 6.4 oz (66.9 kg)   LMP  (LMP Unknown)   SpO2 100%   BMI 29.77 kg/m²    Physical Exam  Constitutional:       General: She is not in acute distress. Appearance: She is well-developed. She is obese. She is ill-appearing. Interventions: Nasal cannula in place. HENT:      Head: Normocephalic. Nose: Congestion present. Eyes:      General: No scleral icterus. Right eye: No discharge. Left eye: No discharge. Neck:      Musculoskeletal: Neck supple. Cardiovascular:      Rate and Rhythm: Normal rate. Pulmonary:      Effort: No respiratory distress. Breath sounds: Decreased breath sounds present. No wheezing. Abdominal:      Palpations: Abdomen is soft. Musculoskeletal:         General: No swelling. Skin:     General: Skin is warm and dry. Neurological:      Mental Status: She is alert. Motor: Weakness present. Allergies:       Allergies   Allergen Reactions    Ibuprofen Nausea Only    Metformin And Related      Diarrhea      Darvon [Propoxyphene Hcl] Nausea And Vomiting       Medications:      Current Facility-Administered Medications   Medication Dose Route Frequency Provider Last Rate Last Admin    carvedilol (COREG) tablet 50 mg  50 mg Oral BID Meera Don MD   50 mg at 03/31/21 0941    predniSONE (DELTASONE) tablet 30 mg  30 mg Oral Daily Jodi Cobos MD   30 mg at 03/31/21 9642    Followed by   Lauro Landeros ON 4/3/2021] predniSONE (DELTASONE) tablet 20 mg  20 mg Oral Daily Ashutosh Freire MD        Followed by   Susan Taylor ON 4/6/2021] predniSONE (DELTASONE) tablet 10 mg  10 mg Oral Daily Ashutosh Freire MD        verapamil (CALAN) tablet 120 mg  120 mg Oral BID Briana Arita MD   120 mg at 03/30/21 2047    nystatin (MYCOSTATIN) 997444 UNIT/ML suspension 500,000 Units  5 mL Oral 4x Daily Becka Mcginnis MD   500,000 Units at 03/31/21 0940    aspirin EC tablet 81 mg  81 mg Oral Daily Briana Arita MD   81 mg at 03/31/21 0941    ipratropium-albuterol (DUONEB) nebulizer solution 1 ampule  1 ampule Inhalation TID [facility] DO   1 ampule at 03/30/21 1243    albuterol (PROVENTIL) nebulizer solution 2.5 mg  2.5 mg Nebulization Q2H PRN [facility] DO   2.5 mg at 03/31/21 0641    insulin glargine (LANTUS) injection vial 20 Units  20 Units Subcutaneous Nightly Karla Ramsey MD   20 Units at 03/30/21 2047    insulin lispro (HUMALOG) injection vial 0-12 Units  0-12 Units Subcutaneous TID WC aKrla Ramsey MD   10 Units at 03/30/21 1730    insulin lispro (HUMALOG) injection vial 0-6 Units  0-6 Units Subcutaneous Nightly Karla Ramsey MD   6 Units at 03/30/21 2049    sodium chloride flush 0.9 % injection 10 mL  10 mL Intravenous 2 times per day Ashutosh Freire MD   10 mL at 03/31/21 0944    sodium chloride flush 0.9 % injection 10 mL  10 mL Intravenous PRN Ashutosh Freire MD        cloNIDine (CATAPRES) tablet 0.2 mg  0.2 mg Oral BID Ashutosh Freire MD   0.2 mg at 03/31/21 0941    glucose (GLUTOSE) 40 % oral gel 15 g  15 g Oral PRN Ashutosh Freire MD        dextrose 50 % IV solution  12.5 g Intravenous PRN Ashutosh Freire MD        glucagon (rDNA) injection 1 mg  1 mg Intramuscular PRN Ashutosh Freire MD        dextrose 5 % solution  100 mL/hr Intravenous PRN Ashutosh Mouna, MD        meropenem (MERREM) 1,000 mg in sodium chloride 0.9 % 100 mL IVPB (mini-bag)  1,000 mg Intravenous Q12H Anu Mcmahon MD   Stopped at 03/31/21 1100    0.9 % sodium chloride infusion Intravenous PRN David Edgar DO        nitroglycerin (NITRO-BID) 2 % ointment 1 inch  1 inch Topical 4 times per day Heidi Raymundo MD   1 inch at 03/31/21 0554    pantoprazole (PROTONIX) injection 40 mg  40 mg Intravenous Daily Tiago Barron MD   40 mg at 03/31/21 0940    And    sodium chloride (PF) 0.9 % injection 10 mL  10 mL Intravenous Daily Tiago Barron MD   10 mL at 03/31/21 0944    heparin (porcine) injection 5,000 Units  5,000 Units Subcutaneous 3 times per day Tiago Barron MD   5,000 Units at 03/31/21 0554    docusate (COLACE) 50 MG/5ML liquid 100 mg  100 mg Oral BID Tiago Barron MD   100 mg at 03/31/21 0940    polyethylene glycol (GLYCOLAX) packet 17 g  17 g Oral Daily Tiago Barron MD   17 g at 03/31/21 0941    budesonide (PULMICORT) nebulizer suspension 500 mcg  0.5 mg Nebulization BID Tiago Barron MD   500 mcg at 03/30/21 0723    hydrALAZINE (APRESOLINE) injection 10 mg  10 mg Intravenous Q4H PRN Anyi Gela Sedar, DO   10 mg at 03/26/21 2321    Or    hydrALAZINE (APRESOLINE) injection 20 mg  20 mg Intravenous Q4H PRN Anyi Gela Sedar, DO   20 mg at 03/25/21 2051    labetalol (NORMODYNE;TRANDATE) injection 20 mg  20 mg Intravenous Q4H PRN Megha Andree, DO   20 mg at 03/24/21 1227    labetalol (NORMODYNE;TRANDATE) injection 40 mg  40 mg Intravenous Q4H PRN Megha Andree, DO   40 mg at 03/27/21 1302    promethazine (PHENERGAN) tablet 12.5 mg  12.5 mg Oral Q6H PRN Dany Gil MD        Or    ondansetron (ZOFRAN) injection 4 mg  4 mg Intravenous Q6H PRN Dany Gil MD        sodium chloride flush 0.9 % injection 10 mL  10 mL Intravenous 2 times per day Dany Gil MD   10 mL at 03/31/21 0945    sodium chloride flush 0.9 % injection 10 mL  10 mL Intravenous PRN Dany Gil MD        acetaminophen (TYLENOL) tablet 650 mg  650 mg Oral Q6H PRN Dany Gil MD   650 mg at 03/25/21 2153    Or    acetaminophen (TYLENOL) suppository 650 mg  650 mg Rectal Q6H PRN Edgar Willett MD   650 mg at 03/25/21 0153       History: PMHx:  Past Medical History:   Diagnosis Date    Anxiety     Asthma     dx 2019 / has smoked since age 14    CHF (congestive heart failure) (HCC)     Chronic back pain     Bilateral L5 S1 Radic on emg--surprisingly worse on the left than the right--pt's symptoms and her MRI show worse on the right    Chronic obstructive pulmonary disease with acute exacerbation ([facility] [address] 10/12/2019    Depression     Fibromyalgia     Hyperlipidemia     meds > 8 yrs    Hypertension     meds > 45 yrs    On home O2     2l per n/c at bedtime mostly,     Osteoarthritis     Type II diabetes mellitus, uncontrolled (HCC)     hx > 8 yrs    Unspecified sleep apnea        PSHx:  Past Surgical History:   Procedure Laterality Date    BACK SURGERY  2017    lumbar disc    CARDIAC CATHETERIZATION  11/3/14     DR. MIRELES / no stents    COLONOSCOPY  08/29/2016    w/polypectomy     COLONOSCOPY N/A 9/29/2020    COLONOSCOPY WITH POLYPECTOMY performed [facility] Dede Ellison MD at [facility] N/A 10/7/2019    EUA HYSTEROSCOPY DILATATION AND CURETTAGE performed by Teri Amado DO at [facility]. [address], COLON, DIAGNOSTIC      EYE SURGERY      [place] with IOL OU / [address] [place]  [address]    umbilical hernia repair    CO ESOPHAGOGASTRODUODENOSCOPY TRANSORAL DIAGNOSTIC N/A 3/24/2017    EGD ESOPHAGOGASTRODUODENOSCOPY performed by Pablito Arrington MD at [place] [address] 2/8/2018    negative findings    CO REVISE MEDIAN N/CARPAL TUNNEL SURG Left 6/5/2017    LEFT  CARPAL TUNNEL RELEASE performed by Goldy Streeter MD at [facility],0+L/F,IMPHL N/A 2/8/2018    TONSILLECTOMY      as child    UPPER GASTROINTESTINAL ENDOSCOPY  08/26/2016    w/bx     UPPER GASTROINTESTINAL ENDOSCOPY N/A 2/25/2020    EGD possible biopsy performed by Dede Ellison MD at [facility]    UPPER GASTROINTESTINAL ENDOSCOPY N/A 7/1/2020    EGD PUSH ENTEROSCOPY performed by Annie Zhou MD at [address] Hx:  Social History     Socioeconomic History    Marital status:      Spouse name: Angi Marie Number of children: 2    Years of education: 15    Highest education level: High school graduate   Occupational History    Occupation: Retired-   Social Needs    Financial resource strain: Not hard at all   [place] insecurity     Worry: Never true     Inability: Never true   [employer] needs     Medical: No     Non-medical: No   Tobacco Use    Smoking status: Former Smoker     Packs/day: 1.00     Years: 59.00     Pack years: 59.00     Types: Cigarettes     Start date: 11/30/2017    Smokeless tobacco: Never Used    Tobacco comment: quit 2-3 weeks ago    Substance and Sexual Activity    Alcohol use: Not Currently    Drug use: No    Sexual activity: Yes     Partners: Male   Lifestyle    Physical activity     Days per week: 0 days     Minutes per session: 0 min    Stress:  Only a little   Relationships    Social connections     Talks on phone: More than three times a week     Gets together: More than three times a week     Attends Taoism service: 1 to 4 times per year     Active member of club or organization: No     Attends meetings of clubs or organizations: Never     Relationship status: Living with partner    Intimate partner violence     Fear of current or ex partner: No     Emotionally abused: No     Physically abused: No     Forced sexual activity: No   Other Topics Concern    None   Social History Narrative    Grew up in [place]     Lives With:  [id number] Spouse    Type of Home: House    Home Layout: Two level, Performs ADL's on one level    Home Access: Stairs to enter with rails    Entrance Stairs - Number of Steps: 4    Bathroom Shower/Tub: Tub/Shower unit, Doors    Bathroom Equipment: Shower chair, Grab bars in [address] [place] [address] Equipment: Rolling walker, Cane, Oxygen(uses her O2 very seldom)    ADL Assistance: Needs assistance    Homemaking Assistance: Needs assistance    Homemaking Responsibilities: No(spouse performs)    Ambulation Assistance: Independent    Transfer Assistance: Independent    Active : No    Occupation: Retired    Type of occupation: worked in [employer]: patient enjoys [employer]       Family Hx:  Family History   Problem Relation Age of Onset    Heart Disease Father         cardiac bypass    Arthritis Father     Arthritis Mother     Other Mother          at age 80    Other Sister         [facility]    No Known Problems Daughter     Stroke Son        LABS: Reviewed     CBC:  Lab Results   Component Value Date    WBC 19.3 2021    RBC 3.10 2021    HGB 9.1 2021    HCT 27.5 2021    MCV 88.6 2021    MCH 29.3 2021    MCHC 33.1 2021    RDW 16.5 2021     2021    MPV 8.8 2015     CBC with Differential:   Lab Results   Component Value Date    WBC 19.3 2021    RBC 3.10 2021    HGB 9.1 2021    HCT 27.5 2021     2021    MCV 88.6 2021    MCH 29.3 2021    MCHC 33.1 2021    RDW 16.5 2021    NRBC 2 2021    BANDSPCT 1 2021    LYMPHOPCT 12.0 2021    MONOPCT 3.7 2021    BASOPCT 0.3 2021    MONOSABS 0.8 2021    LYMPHSABS 2.3 2021    EOSABS 0.0 2021    BASOSABS 0.0 2021     CMP:    Lab Results   Component Value Date     2021    K 4.2 2021    K 4.4 10/19/2020     2021    CO2 26 2021    BUN 46 2021    CREATININE 1.44 2021    GFRAA 42.7 2021    LABGLOM 35.3 2021    GLUCOSE 76 2021    PROT 5.9 2021    LABALBU 3.0 2021    CALCIUM 9.6 2021    BILITOT <0.2 2021    ALKPHOS 99 2021    AST 21 2021    ALT 28 2021     BMP: Lab Results   Component Value Date     03/31/2021    K 4.2 03/31/2021    K 4.4 10/19/2020     03/31/2021    CO2 26 03/31/2021    BUN 46 03/31/2021    LABALBU 3.0 03/31/2021    CREATININE 1.44 03/31/2021    CALCIUM 9.6 03/31/2021    GFRAA 42.7 03/31/2021    LABGLOM 35.3 03/31/2021    GLUCOSE 76 03/31/2021     TSH:   Lab Results   Component Value Date    TSH 0.474 11/30/2020     Vitamin B12 and Folate: No components found for: FOLIC,  I86  Urinalysis:   Lab Results   Component Value Date    NITRU Negative 10/18/2020    WBCUA 6-9 10/18/2020    BACTERIA Negative 10/18/2020    RBCUA 3-5 10/18/2020    BLOODU SMALL 10/18/2020    SPECGRAV 1.009 10/18/2020    GLUCOSEU Negative 10/18/2020           FUNCTIONAL ADL´S:     Independent: [ x ] Eating, [ x ] Dressing, [x ] Transferring, [ x ] Toileting, [ x ] Bathing, [ x ] Continence  Dependent   : [  ] Eating, [   ] Dressing, [   ] Transferring, [   ] [place], [   ] Bathing, [   ] Continence  W. assistant : [  ] Eating, [   ] Dressing, [   ] Transferring, [   ] [place], [   ] Bathing, [   ] Continence    Radiology: Reviewed      Echocardiogram Complete 2d With Doppler With Color     Result Date: 3/19/2021  Transthoracic Echocardiography Report (TTE)  Demographics  Patient Name   Jay Lipscomb  Gender              Female                 M  Patient Number [id number]        Race                Black                                 Ethnicity  Visit Number   [id number]       Room Number         IC05  Corporate ID                   Date of Study       03/19/2021  Accession      [id number]      Referring Physician  Number  Date of Birth  1945      Sonographer         Adrián Oliveira LPN  Age            67 year(s)      Interpreting        [facility]) Cardiology                                 Physician           Katerina Genao MD Procedure Type of Study  TTE procedure:ECHO COMPLETE 2D W/DOP W/COLOR. Procedure Date Date: 03/19/2021 Start: 09:15 AM Study Location: Portable Technical Quality: Good visualization Indications:Cardiac arrest. Patient Status: Routine Weight: 143 pounds BP: 89/48 mmHg Allergies   - Other allergy:(Darvon). Conclusions  Summary  Mitral annular calcification is present. 1-2+ MR  Normal tricuspid valve structure and function. 1+ TR  RVSP 31 mmHg  severe CLVH  LVEF 50%  E/A flow reversal noted. Suggestive of diastolic dysfunction. Signature  ----------------------------------------------------------------  Electronically signed by Hellen Ochoa MD(Interpreting  physician) on 03/19/2021 02:13 PM  ----------------------------------------------------------------  Findings Left Ventricle severe CLVH LVEF 50% E/A flow reversal noted. Suggestive of diastolic dysfunction. Right Ventricle Normal right ventricle structure and function. Normal right ventricle systolic pressure. Left Atrium Moderately dilated left atrium. Right Atrium Normal right atrium. Mitral Valve Mitral annular calcification is present. 1-2+ MR Tricuspid Valve Normal tricuspid valve structure and function. 1+ TR RVSP 31 mmHg Aortic Valve Normal aortic valve structure and function. Pulmonic Valve The pulmonic valve was not well visualized . Pericardial Effusion No evidence of pericardial effusion. Pleural Effusion No evidence of pleural effusion. Aorta \ Miscellaneous The aorta is within normal limits. M-Mode Measurements (cm)  LVIDd: 4.01 cm                         LVIDs: 1.85 cm  IVSd: 1.45 cm                          IVSs: 1.23 cm  LVPWd: 1.32 cm                         LVPWs: 1.61 cm  Rt. Vent.  Dimension: 1.07 cm           AO Root Dimension: 2.8 cm                                         ACS: 1.47 cm                                         LA: 3.04 cm                                         LVOT: 1.98 cm Doppler Measurements:  AV Velocity:0.01 m/s                   MV Peak E-Wave: 0.96 m/s  AV Peak Gradient: 7.73 mmHg MV Peak A-Wave: 1.58 m/s  AV Mean Gradient: 3.01 mmHg  AV Area (Continuity):1.27 cm^2         MV P1/2t: 105.6 msec  TR Velocity:2.29 m/s                   MVA by PHT2.08 cm^2  TR Gradient:21.01 mmHg                 Estimated RAP:10 mmHg                                         RVSP:31 mmHg Valves  Mitral Valve  Peak E-Wave: 0.96 m/s          Peak A-Wave: 1.58 m/s  P1/2t: 105.6 msec              E/A Ratio: 0.61                                 Peak Gradient: 3.7 mmHg  Area (PHT): 2.08 cm^2          Deceleration Time: 349.6 msec  MR Velocity: 4.66 m/s  Aortic Valve  Peak Velocity: 1.39 m/s                Mean Velocity: 0.79 m/s  Peak Gradient: 7.73 mmHg               Mean Gradient: 3.01 mmHg  Area (continuity): 1.27 cm^2  AV VTI: 28.28 cm  Cusp Separation: 1.47 cm  Tricuspid Valve  Estimated RVSP: 31 mmHg              Estimated RAP: 10 mmHg  TR Velocity: 2.29 m/s                TR Gradient: 21.01 mmHg  Pulmonic Valve  Peak Velocity: 0.68 m/s           Peak Gradient: 1.86 mmHg                                    Estimated PASP: 31.01 mmHg  LVOT  Peak Velocity: 0.72 m/s              Mean Velocity: 0.41 m/s  Peak Gradient: 2 mmHg                Mean Gradient: 0.9 mmHg  LVOT Diameter: 1.98 cm               LVOT VTI: 11.64 cm Structures  Left Atrium  LA Dimension: 3.04 cm                 LA Area: 18.08 cm^2  LA/Aorta: 1.09  LA Volume/Index: 49.97 ml  Left Ventricle  Diastolic Dimension: 8.99 cm         Systolic Dimension: 8.42 cm  Septum Diastolic: 0.29 cm            Septum Systolic: 0.12 cm  PW Diastolic: 4.64 cm                PW Systolic: 2.82 cm                                       FS: 53.9 %  LV EDV/LV EDV Index: 70.29 ml        LV ESV/LV ESV Index: 10.47 ml  EF Calculated: 85.1 %  LVOT Diameter: 1.98 cm  Right Ventricle  Diastolic Dimension: 6.82 cm                                    RV Systolic Pressure: 44.78 mmHg Aorta/ Miscellaneous Aorta  Aortic Root: 2.8 cm  LVOT Diameter: 1.98 cm     Xr Chest Portable     Result Date: 3/19/2021  EXAMINATION: XR CHEST PORTABLE CLINICAL HISTORY: INTUBATION COMPARISONS: OCTOBER 9, 2020 FINDINGS: Endotracheal tube has migrated 3 cm inferiorly, just lying cephalad to mojgan. Nasogastric tube courses beneath diaphragm. Osseous structures intact. Cardiopericardial silhouette normal. Aorta calcified. Ill-defined area increased opacity right  lung base.      RIGHT LOWER LUNG SUBSEGMENTAL ATELECTASIS/PNEUMONIA. INTERVAL INFERIOR MIGRATION ENDOTRACHEAL TUBE 3 CM WITH TIP NOW JUST PROXIMAL TO MOJGAN. MAY WISH TO RETRACT ENDOTRACHEAL TUBE 2 CM.    Xr Chest Portable     Result Date: 3/19/2021  EXAMINATION: XR CHEST PORTABLE CLINICAL HISTORY: RESPIRATORY FAILURE COMPARISONS: MARCH 16, 2021 FINDINGS: Endotracheal tube tip 2.3 cm superior to mojgan. Nasogastric tube courses beneath diaphragm area osseous structures intact. Ill-defined areas increase opacity at lung bases. Cardiopericardial silhouette normal.      BIBASILAR SUBSEGMENTAL ATELECTASIS/PNEUMONIA IN THIS PARTIALLY EXPIRATORY CHEST RADIOGRAPH. Assessment and plan:    -Advance Care Planning  Discussed goals of care with patient. Explained in extensive detail nuances between full code, DNR CCA and DNR CC. Patient has made the decision to be Limited code previously      -Goals of Care Discussion:  Disease process and goals of treatment were discussed in basic terms. We discussed the palliative care philosophy in light of those goals. We discussed all care options contingent on treatment response and QOL. Much active listening, presence, and emotional support were given. -Aimee's goal is to optimize available comfort care measures to decrease hospitalization and prevent ER visits when possible, manage symptomology with future Palliative Care service.      Palliative Care Encounter  -Met with patient for Bychristen Sullivan, ACP, and initial discussion on pall care philosophy.   - Ashish Grade is 67years old with multiple comorbidities which includes nonobstructive CAD, diastolic HF, HOCM, HTN, IDDM2, CKD3, anemia, prolonged hospitalization in  complicated by cardiac arrest s/p ROSC,requiring transvenous pacing due to persistent bradycardia and hypotension despite pressor support, SAÚL, and septic shock.  -Considering her comorbidities and her risk for re-hospitalization, [facility] Offer is appropriate for Palliative Care Services.  -Call for any questions, concerns or change in condition. Much support, guidance and active listening provided.  -Prognostication cannot be determined at this time. Appropriate prognostication should be available once patient has completed rehab and reach recovery. 3/30/21- Met with patient and  at bedside. Explained the difference between [facility] and hospice care. More than 35 minutes spent discussion goals of care with patient and her . They have both decided to have palliative care service as outpatient. -PPS today is 30    Patient is currently being treated for multiple medical conditions includin. Cardiac arrest s/p ROSC  2. Acute hypoxic respiratory failure  3. Lactic acidosis  4. SAÚL/CKD3  5. Oral candidiasis  6. UTI with ESBL    Palliative care will continue to follow as outpatient. Thank you for the opportunity to participate in Aimee's care.     Electronically signed by JOHNATHON Coronado CNP on 3/31/2021 at 11:43 AM

## 2021-03-31 NOTE — PROGRESS NOTES
Hospitalist Progress Note      PCP: Belle Gomez MD    Date of Admission: 3/18/2021    Chief Complaint:  No acute events, afebrile, stable HD, on 2 liters of NC,     Medications:  Reviewed    Infusion Medications    dextrose      sodium chloride       Scheduled Medications    carvedilol  50 mg Oral BID    predniSONE  30 mg Oral Daily    Followed by   Stuart Raymond ON 4/3/2021] predniSONE  20 mg Oral Daily    Followed by   Stuart Raymond ON 4/6/2021] predniSONE  10 mg Oral Daily    verapamil  120 mg Oral BID    nystatin  5 mL Oral 4x Daily    aspirin  81 mg Oral Daily    ipratropium-albuterol  1 ampule Inhalation TID    insulin glargine  20 Units Subcutaneous Nightly    insulin lispro  0-12 Units Subcutaneous TID WC    insulin lispro  0-6 Units Subcutaneous Nightly    sodium chloride flush  10 mL Intravenous 2 times per day    cloNIDine  0.2 mg Oral BID    meropenem  1,000 mg Intravenous Q12H    nitroglycerin  1 inch Topical 4 times per day    pantoprazole  40 mg Intravenous Daily    And    sodium chloride (PF)  10 mL Intravenous Daily    heparin (porcine)  5,000 Units Subcutaneous 3 times per day    docusate  100 mg Oral BID    polyethylene glycol  17 g Oral Daily    budesonide  0.5 mg Nebulization BID    sodium chloride flush  10 mL Intravenous 2 times per day     PRN Meds: albuterol, sodium chloride flush, glucose, dextrose, glucagon (rDNA), dextrose, sodium chloride, hydrALAZINE **OR** hydrALAZINE, labetalol, labetalol, promethazine **OR** ondansetron, sodium chloride flush, acetaminophen **OR** acetaminophen      Intake/Output Summary (Last 24 hours) at 3/31/2021 1105  Last data filed at 3/31/2021 0616  Gross per 24 hour   Intake 100 ml   Output 2050 ml   Net -1950 ml       Exam:    BP (!) 161/71   Pulse 88   Temp 98.2 °F (36.8 °C) (Oral)   Resp 18   Ht 4' 11\" (1.499 m)   Wt 147 lb 6.4 oz (66.9 kg)   LMP  (LMP Unknown)   SpO2 100%   BMI 29.77 kg/m²     General appearance: awake, alert, cooperative  Lungs: coarse BS bilaterally  Heart: S1S2,RRR  Abdomen: soft, BS active  Extremities: no edema    Labs:   Recent Labs     03/29/21  0513 03/30/21  0533 03/31/21  0600   WBC 11.5* 14.2* 19.3*   HGB 9.1* 9.2* 9.1*   HCT 27.6* 28.4* 27.5*   * 448* 484*     Recent Labs     03/29/21  0513 03/30/21  0537 03/31/21  0600    138 138   K 4.5 4.7 4.2    101 100   CO2 24 26 26   BUN 40* 39* 46*   CREATININE 1.30* 1.25* 1.44*   CALCIUM 10.0* 10.3* 9.6   PHOS 2.6 3.1 2.6     Recent Labs     03/29/21  0513   AST 21   ALT 28   BILITOT <0.2   ALKPHOS 99     Recent Labs     03/29/21  0513   INR 1.1     No results for input(s): CKTOTAL, TROPONINI in the last 72 hours. Urinalysis:      Lab Results   Component Value Date    NITRU Negative 10/18/2020    WBCUA 6-9 10/18/2020    BACTERIA Negative 10/18/2020    RBCUA 3-5 10/18/2020    BLOODU SMALL 10/18/2020    SPECGRAV 1.009 10/18/2020    GLUCOSEU Negative 10/18/2020       Radiology:  XR CHEST PORTABLE   Final Result      INTERVAL EXTUBATION. OTHERWISE, ESSENTIALLY STABLE CHEST COMPARED TO 3/25/2021. FL MODIFIED BARIUM SWALLOW W VIDEO   Final Result      ESSENTIALLY NEGATIVE MODIFIED BARIUM SWALLOW. A FULL REPORT WILL BE MADE BY THE SPEECH PATHOLOGY TEAM.               IR PICC WO SQ PORT/PUMP > 5 YEARS   Final Result      SUCCESSFUL PICC PLACEMENT WITHOUT IMMEDIATE COMPLICATIONS. 6.6 mGy of fluoroscopy was used, with 1 fluoroscopic still saved. No diagnostic images were obtained. XR CHEST PORTABLE   Final Result   LEFT PLEURAL EFFUSION. BILATERAL LOWER LUNG ATELECTASIS/PNEUMONIA. XR CHEST PORTABLE   Final Result   No radiographic evidence of acute intrathoracic process. There is an NG tube coursing into the stomach. There is a left pleural effusion and likely bibasilar atelectasis or infiltrate. CT HEAD WO CONTRAST   Final Result   Impression:      Mild cerebral atrophy. Chronic ischemic white matter disease. Remote left thalamic lacunar infarct. Bilateral ethmoid and sphenoid sinusitis. All CT scans at this facility use dose modulation, iterative reconstruction, and/or weight based dosing when appropriate to reduce radiation dose to as low as reasonably achievable. XR CHEST PORTABLE   Final Result   INTERVAL RETRACTION OF THE ENDOTRACHEAL TUBE IN THE TRACHEA. CONSIDER RETRACTING ENDOTRACHEAL TUBE 1 CM. LEFT LOWER LUNG ATELECTASIS. XR CHEST PORTABLE   Final Result      CHEST ENDOTRACHEAL TUBE IN RIGHT MAINSTEM BRONCHUS. RECOMMENDING RETRACTING ENDOTRACHEAL TUBE 7 CM. INTERVAL DEVELOPMENT OF LEFT MID AND LEFT LOWER LUNG ATELECTASIS. INTERVAL RESOLUTION OF RIGHT LOWER LUNG ATELECTASIS. ABOVE FINDINGS DISCUSSED WITH PATIENT'S NURSE IGNACIO, VIA TELEPHONE, IN THE ICU, AT [address] [place] [address], MARCH 20, 2021. XR CHEST PORTABLE   Final Result   BIBASILAR SUBSEGMENTAL ATELECTASIS/PNEUMONIA IN THIS PARTIALLY EXPIRATORY CHEST RADIOGRAPH. XR CHEST PORTABLE   Final Result   RIGHT LOWER LUNG SUBSEGMENTAL ATELECTASIS/PNEUMONIA. INTERVAL INFERIOR MIGRATION ENDOTRACHEAL TUBE 3 CM WITH TIP NOW JUST PROXIMAL TO FAISAL. MAY WISH TO RETRACT ENDOTRACHEAL TUBE 2 CM.               Assessment/Plan:    67 y.o. female with PMH of nonobstructive CAD, diastolic HF, HOCM, HTN, IDDM2, CKD3, anemia, prolonged hospitalization in [id number] complicated by cardiac arrest s/p ROSC, SAÚL,septic shock who presented with:     Cardiac arrest s/p ROSC   - required Dopamine, norepinephrine and epinephrine infusion  - off pressors and hypothermia protocol  - TTE showed LVEF of 50%, severe LVH  - remains hypertensive, on Verapamil, Coreg, clonidine  - management per cardiology      Acute hypoxic respiratory failure  - requiring intubation and mechanical ventilation  - extubated on 3/22, but had to be re-intubated same day due to stridor and respiratory distress  - extubated on 3/26 to BIPAP, no re-intubation per family  - on 2 liters of NC  - switched from IV Solumedrol to prednisone taper  - management per pulmonology/critical care service    Acute encephalopathy  - likely hypoxic-ischemic etiology from cardiac arrest  - CT head was negative per report  - improving  - neurology following     Acute / chronic anemia   - with Hb of 5.8 on arrival, FOBT was positive  - improved s/p PRBCs given in ED  - follow H/H      SAÚL/CKD3  - improved  - nephrology following     IDDM2 with hyperglycemia  - not well controlled with Glc in the 400s, probably worsened by steroid therapy  - on Lantus, ISS  - endocrinology consulted    ESBL E.Coli UTI / aspiration pneumonia  - on IV meropenem per ID     Leukocytosis   - trending up, monitor    Dysphagia   - MBS was negative per speech therapy    Code status - [facility],  met with hospice, not ready to make a decision for now      Disposition - patient and  are refusing SNF, will go home upon discharge,  and  following            Electronically signed by Marija Stephen MD on 3/31/2021 at 11:05 AM

## 2021-04-01 ENCOUNTER — APPOINTMENT (OUTPATIENT)
Dept: CT IMAGING | Age: 76
DRG: 296 | End: 2021-04-01
Payer: MEDICARE

## 2021-04-01 LAB
ALBUMIN SERPL-MCNC: 3.2 G/DL (ref 3.5–4.6)
ANION GAP SERPL CALCULATED.3IONS-SCNC: 8 MEQ/L (ref 9–15)
BASOPHILS ABSOLUTE: 0.1 K/UL (ref 0–0.2)
BASOPHILS RELATIVE PERCENT: 0.8 %
BUN BLDV-MCNC: 43 MG/DL (ref 8–23)
CALCIUM SERPL-MCNC: 9.7 MG/DL (ref 8.5–9.9)
CHLORIDE BLD-SCNC: 99 MEQ/L (ref 95–107)
CO2: 28 MEQ/L (ref 20–31)
CREAT SERPL-MCNC: 1.37 MG/DL (ref 0.5–0.9)
EOSINOPHILS ABSOLUTE: 0.2 K/UL (ref 0–0.7)
EOSINOPHILS RELATIVE PERCENT: 1.1 %
GFR AFRICAN AMERICAN: 45.3
GFR NON-AFRICAN AMERICAN: 37.4
GLUCOSE BLD-MCNC: 267 MG/DL (ref 60–115)
GLUCOSE BLD-MCNC: 310 MG/DL (ref 70–99)
GLUCOSE BLD-MCNC: 329 MG/DL (ref 60–115)
GLUCOSE BLD-MCNC: 359 MG/DL (ref 60–115)
GLUCOSE BLD-MCNC: 442 MG/DL (ref 60–115)
HCT VFR BLD CALC: 27.4 % (ref 37–47)
HEMOGLOBIN: 9 G/DL (ref 12–16)
LYMPHOCYTES ABSOLUTE: 1.8 K/UL (ref 1–4.8)
LYMPHOCYTES RELATIVE PERCENT: 12.8 %
MAGNESIUM: 1.7 MG/DL (ref 1.7–2.4)
MCH RBC QN AUTO: 29.5 PG (ref 27–31.3)
MCHC RBC AUTO-ENTMCNC: 32.8 % (ref 33–37)
MCV RBC AUTO: 89.8 FL (ref 82–100)
MONOCYTES ABSOLUTE: 1.4 K/UL (ref 0.2–0.8)
MONOCYTES RELATIVE PERCENT: 9.6 %
NEUTROPHILS ABSOLUTE: 10.8 K/UL (ref 1.4–6.5)
NEUTROPHILS RELATIVE PERCENT: 75.7 %
PDW BLD-RTO: 16.4 % (ref 11.5–14.5)
PERFORMED ON: ABNORMAL
PHOSPHORUS: 2.8 MG/DL (ref 2.3–4.8)
PLATELET # BLD: 453 K/UL (ref 130–400)
POTASSIUM SERPL-SCNC: 4.4 MEQ/L (ref 3.4–4.9)
RBC # BLD: 3.05 M/UL (ref 4.2–5.4)
SODIUM BLD-SCNC: 135 MEQ/L (ref 135–144)
WBC # BLD: 14.3 K/UL (ref 4.8–10.8)

## 2021-04-01 PROCEDURE — 2580000003 HC RX 258: Performed by: INTERNAL MEDICINE

## 2021-04-01 PROCEDURE — 99232 SBSQ HOSP IP/OBS MODERATE 35: CPT | Performed by: INTERNAL MEDICINE

## 2021-04-01 PROCEDURE — 51702 INSERT TEMP BLADDER CATH: CPT

## 2021-04-01 PROCEDURE — 6370000000 HC RX 637 (ALT 250 FOR IP): Performed by: INTERNAL MEDICINE

## 2021-04-01 PROCEDURE — 94640 AIRWAY INHALATION TREATMENT: CPT

## 2021-04-01 PROCEDURE — 99232 SBSQ HOSP IP/OBS MODERATE 35: CPT | Performed by: NURSE PRACTITIONER

## 2021-04-01 PROCEDURE — 85025 COMPLETE CBC W/AUTO DIFF WBC: CPT

## 2021-04-01 PROCEDURE — 80069 RENAL FUNCTION PANEL: CPT

## 2021-04-01 PROCEDURE — 94761 N-INVAS EAR/PLS OXIMETRY MLT: CPT

## 2021-04-01 PROCEDURE — APPSS30 APP SPLIT SHARED TIME 16-30 MINUTES: Performed by: PHYSICIAN ASSISTANT

## 2021-04-01 PROCEDURE — C9113 INJ PANTOPRAZOLE SODIUM, VIA: HCPCS | Performed by: INTERNAL MEDICINE

## 2021-04-01 PROCEDURE — 70450 CT HEAD/BRAIN W/O DYE: CPT

## 2021-04-01 PROCEDURE — 6360000002 HC RX W HCPCS: Performed by: INTERNAL MEDICINE

## 2021-04-01 PROCEDURE — 2060000000 HC ICU INTERMEDIATE R&B

## 2021-04-01 PROCEDURE — 83735 ASSAY OF MAGNESIUM: CPT

## 2021-04-01 RX ORDER — INSULIN GLARGINE 100 [IU]/ML
40 INJECTION, SOLUTION SUBCUTANEOUS
Status: DISCONTINUED | OUTPATIENT
Start: 2021-04-02 | End: 2021-04-02 | Stop reason: HOSPADM

## 2021-04-01 RX ORDER — INSULIN GLARGINE 100 [IU]/ML
35 INJECTION, SOLUTION SUBCUTANEOUS
Status: DISCONTINUED | OUTPATIENT
Start: 2021-04-02 | End: 2021-04-01

## 2021-04-01 RX ADMIN — VERAPAMIL HYDROCHLORIDE 120 MG: 80 TABLET, FILM COATED ORAL at 20:18

## 2021-04-01 RX ADMIN — SODIUM CHLORIDE, PRESERVATIVE FREE 10 ML: 5 INJECTION INTRAVENOUS at 10:15

## 2021-04-01 RX ADMIN — IPRATROPIUM BROMIDE AND ALBUTEROL SULFATE 1 AMPULE: .5; 3 SOLUTION RESPIRATORY (INHALATION) at 19:27

## 2021-04-01 RX ADMIN — SODIUM CHLORIDE, PRESERVATIVE FREE 10 ML: 5 INJECTION INTRAVENOUS at 10:06

## 2021-04-01 RX ADMIN — HEPARIN SODIUM 5000 UNITS: 5000 INJECTION INTRAVENOUS; SUBCUTANEOUS at 14:05

## 2021-04-01 RX ADMIN — CLONIDINE HYDROCHLORIDE 0.2 MG: 0.1 TABLET ORAL at 20:19

## 2021-04-01 RX ADMIN — CARVEDILOL 50 MG: 25 TABLET, FILM COATED ORAL at 20:19

## 2021-04-01 RX ADMIN — NITROGLYCERIN 1 INCH: 20 OINTMENT TOPICAL at 00:38

## 2021-04-01 RX ADMIN — NYSTATIN 500000 UNITS: 100000 SUSPENSION ORAL at 10:04

## 2021-04-01 RX ADMIN — SODIUM CHLORIDE, PRESERVATIVE FREE 10 ML: 5 INJECTION INTRAVENOUS at 20:17

## 2021-04-01 RX ADMIN — NYSTATIN 500000 UNITS: 100000 SUSPENSION ORAL at 18:10

## 2021-04-01 RX ADMIN — NYSTATIN 500000 UNITS: 100000 SUSPENSION ORAL at 20:18

## 2021-04-01 RX ADMIN — VERAPAMIL HYDROCHLORIDE 120 MG: 80 TABLET, FILM COATED ORAL at 10:03

## 2021-04-01 RX ADMIN — NYSTATIN 500000 UNITS: 100000 SUSPENSION ORAL at 14:05

## 2021-04-01 RX ADMIN — Medication 10 ML: at 10:15

## 2021-04-01 RX ADMIN — DOCUSATE SODIUM 100 MG: 50 LIQUID ORAL at 20:18

## 2021-04-01 RX ADMIN — NITROGLYCERIN 1 INCH: 20 OINTMENT TOPICAL at 07:33

## 2021-04-01 RX ADMIN — HEPARIN SODIUM 5000 UNITS: 5000 INJECTION INTRAVENOUS; SUBCUTANEOUS at 07:31

## 2021-04-01 RX ADMIN — ASPIRIN 81 MG: 81 TABLET, COATED ORAL at 10:02

## 2021-04-01 RX ADMIN — CARVEDILOL 50 MG: 25 TABLET, FILM COATED ORAL at 10:02

## 2021-04-01 RX ADMIN — BUDESONIDE 500 MCG: 0.5 SUSPENSION RESPIRATORY (INHALATION) at 19:27

## 2021-04-01 RX ADMIN — MEROPENEM 1000 MG: 1 INJECTION, POWDER, FOR SOLUTION INTRAVENOUS at 20:16

## 2021-04-01 RX ADMIN — PANTOPRAZOLE SODIUM 40 MG: 40 INJECTION, POWDER, FOR SOLUTION INTRAVENOUS at 10:04

## 2021-04-01 RX ADMIN — INSULIN GLARGINE 25 UNITS: 100 INJECTION, SOLUTION SUBCUTANEOUS at 10:14

## 2021-04-01 RX ADMIN — HEPARIN SODIUM 5000 UNITS: 5000 INJECTION INTRAVENOUS; SUBCUTANEOUS at 20:19

## 2021-04-01 RX ADMIN — PREDNISONE 30 MG: 10 TABLET ORAL at 10:04

## 2021-04-01 RX ADMIN — MEROPENEM 1000 MG: 1 INJECTION, POWDER, FOR SOLUTION INTRAVENOUS at 10:15

## 2021-04-01 RX ADMIN — CLONIDINE HYDROCHLORIDE 0.2 MG: 0.1 TABLET ORAL at 10:03

## 2021-04-01 ASSESSMENT — ENCOUNTER SYMPTOMS
CHEST TIGHTNESS: 0
SHORTNESS OF BREATH: 0
ALLERGIC/IMMUNOLOGIC NEGATIVE: 1
COLOR CHANGE: 0
DIARRHEA: 0
TROUBLE SWALLOWING: 1
GASTROINTESTINAL NEGATIVE: 1
CONSTIPATION: 0
WHEEZING: 0
NAUSEA: 0
VOICE CHANGE: 1
EYES NEGATIVE: 1
RESPIRATORY NEGATIVE: 1
VOMITING: 0

## 2021-04-01 ASSESSMENT — PAIN SCALES - GENERAL
PAINLEVEL_OUTOF10: 0

## 2021-04-01 NOTE — PROGRESS NOTES
Spiritual Care Services     Summary of Visit:  Rapid response was called. When this  arrived, the patient was being examined by the doctor and appeared to be in good spirits. The patient's daughter was present and very emotional. After medical staff cleared the patient, this  and the daughter gathered at patient's bedside and prayed for the patient. Spiritual Assessment/Intervention/Outcomes:    Encounter Summary  Services provided to[de-identified] Patient and family together  Referral/Consult From[de-identified] Nurse  Support System: Spouse, Children  Place of Latter-day: None  Continue Visiting: No  Complexity of Encounter: Moderate  Length of Encounter: 30 minutes  Spiritual Assessment Completed: Yes  Routine  Type:  Follow up  Assessment: Calm, Approachable  Intervention: Provided reading materials/devotional materials  Outcome: Coping, Comfort  Crisis  Type: Rapid response  Assessment: Calm, Approachable, Coping  Intervention: Explored feelings, thoughts, concerns, Nurtured hope, Prayer, Discussed belief system/Moravian practices/angelo, Discussed relationship with God  Outcome: Comfort, Expressed gratitude, Engaged in conversation, Receptive, Encouraged  Spiritual/Yarsanism  Type: Spiritual support  Assessment: Approachable, Calm, Coping  Intervention: Prayer, Nurtured hope, Explored feelings, thoughts, concerns, Discussed illness/injury and it's impact  Outcome: Expressed gratitude, Engaged in conversation, Expressed feelings/needs/concerns, Coping     Grief and Life Adjustment  Type: Adjustment to illness  Assessment: Tearful, Approachable, Shock, Despair  Intervention: Active listening, Sustaining presence/ Ministry of presence  Outcome: Receptive, Comfort, Tearful, Less anxious, less agitated  Advance Directives (For Healthcare)  Healthcare Directive: No, patient does not have an advance directive for healthcare treatment           Values / Beliefs  Do you have any ethnic, cultural, sacramental, or spiritual Tenriism needs you would like us to be aware of while you are in the hospital?: No    Care Plan:    No follow up unless patient or family request.    [id number] [address]   Electronically signed by Edmund Martinez on 4/1/21 at 4:57 PM EDT     To reach a  for emotional and spiritual support, place an [facility] consult request.   If a  is needed immediately, dial 0 and ask to page the on-call .

## 2021-04-01 NOTE — FLOWSHEET NOTE
Pt was very uncooperative throughout the evening. AVASYS alarmed/ called numerous times. RN Deborah Ennis to call supervisor and request PCA for 1 to 1 coverage due to safety concerns as pt is also attempting to exit bed unsafely.

## 2021-04-01 NOTE — CONSULTS
[address]                      [address], [address] [facility]                                  CONSULTATION    PATIENT NAME: Jonathan Bentley                   :        1945  MED REC NO:   [id number]                            ROOM:       [id number]  ACCOUNT NO:   [de-identified]                           ADMIT DATE: 2021  PROVIDER:     Bridgette Frances MD    CONSULT DATE:  2021    ENDOCRINE CONSULT    REFERRING PHYSICIAN:  Gary Lawrence MD    REASON FOR CONSULT:  Uncontrolled type 2 diabetes. CHIEF COMPLAINT AND HISTORY OF PRESENT ILLNESS:  The patient is a  58-year-old female with known history of diabetes with labile, brittle  control, who was admitted with respiratory distress on , history of  coronary artery disease, congestive heart failure, chronic kidney  disease, type 2 diabetes. Had prolonged hospitalization in 10/2020 with  history complicated by cardiac arrest.  Admitted this time with cardiac  arrest status post respiratory failure and pacing. Blood sugars have  been labile here due to variable p.o. intake. Blood sugar was 82 this  morning, has gone up to 420. The patient currently on Lantus 20 units  at night and medium dose sliding scale. Her hemoglobin A1c was 9.6. Prior A1cs have been between 9-12 range. Home medications for her diabetes includes Humalog 18 with each meals  and Lantus 60 at night. The patient's appetite and diet has been mostly  liquids, juices. She is not able to eat well, has been using [employer] intermittently. Recently, had a virtual visit on . Reviewed chemistries. Sodium 138, potassium was 4.2, chloride was 100,  CO2 was 26, BUN 46, creatinine 1.44. The patient is also currently being seen by Palliative Care, Infectious  Disease, Cardiology, Neurology, Nephrology, Gastroenterology and  Pulmonary. ID is seeing the patient for ESBL, E. coli UTI, possible  aspiration pneumonia.   Neuro for encephalopathy, ischemic. Cardiology  consult was done for congestive heart failure. P.o. intake has been  fluctuating also. PAST MEDICAL HISTORY:  Significant for type 2 diabetes, congestive heart  failure, anxiety, fibromyalgia, hypercholesteremia, hypertension, COPD. PAST SURGICAL HISTORY:  Includes upper GI endoscopy, colonoscopy, EGD,  tonsillectomy, D and C. FAMILY HISTORY:  Arthritis, heart disease. PERSONAL AND SOCIAL HISTORY:  Former smoker. Denies any alcohol. MEDICATIONS:  Meds here included Lantus 20 at night, Humalog medium dose  sliding scale, Pulmicort, Coreg, Catapres, Colace, DuoNeb via nebulizer,  Merrem, Mycostatin, Protonix, tapered prednisone, Calan. ALLERGIES:  Include IBUPROFEN, METFORMIN, DARVON. REVIEW OF SYSTEMS:  Other than recent respiratory and cardiopulmonary  arrest, 14-point review of systems was negative. PHYSICAL EXAMINATION:  GENERAL:  The patient unable to answer questions. VITAL SIGNS:  Blood pressure 132/61, pulse rate was 96, respiratory rate  was 18, temperature 98. 1. HEENT:  Normocephalic, atraumatic. Pupils were equal, reactive to  light. Oral mucosa was moist.  NECK:  Supple. Trachea in midline. CHEST:  Lungs were showing minimal basal crackles bilaterally. CARDIOVASCULAR:  Heart sounds were normal.  No murmurs or thrills  present. ABDOMEN:  Soft, nonobese. Bowel sounds are present. No organomegaly or  tenderness. EXTREMITIES:  Lower extremities reveal no edema. SKIN:  Intact. MUSCULOSKELETAL:  No joint swelling. NEUROLOGIC:  The patient unable to cooperate. PSYCHIATRIC:  Appears inappropriate to depressed affect. LABORATORY DATA:  As above. ASSESSMENT:  Status post cardiopulmonary arrest, type 2 diabetes, E.  coli, COPD, encephalopathy, poor conditioning, weakness. PLAN:  Change Lantus to 25 in the lunchtime, Humalog 4 units with each  meals, hold if sugars less than 150. Continue medium dose sliding  scale.   Blood sugar goal 150-250 range. Avoid hypoglycemia. Continue  other supportive measures. Hospice is on consult too. Long-term A1c  goal of 8-9. Thank you for the consult.         Jose Davila MD    D: 03/31/2021 21:13:07       T: 03/31/2021 21:19:47     [email or web address]  Job#: [id number]     Doc#: [id number]    CC:

## 2021-04-01 NOTE — PROGRESS NOTES
Palliative Care Progress Note  Patient: Mickey Mixon  Gender: female  YOB: 1945  Unit/Bed: X263/G473-95  CodeStatus: Limited  Inpatient Treatment Team: Treatment Team: Attending Provider: Michael Hirsch MD; Consulting Physician: Kiley Mohamud MD; Consulting Physician: Barrera Bennett DO; Consulting Physician: Myron Yates MD; Utilization Reviewer: Kenzie Colbert RN; Consulting Physician: Sesar Meier MD; Utilization Reviewer: Kate Haynes RN; Consulting Physician: Warnell Duverney, MD; Consulting Physician: Micheal Martinez MD; Registered Nurse: Rebecca Robinsland Aniceto; Nursing Student: Hilda Dudley; : MILAN Whitt  Admit Date:  3/18/2021    Chief Complaint: fatigue    History of Presenting Illness:      Mickey Mixon is a 67 y.o. female on hospital day 15 with a history of  Cardiac arrest. PMH is significant for nonobstructive CAD, diastolic HF, HOCM, HTN, IDDM2, CKD3, anemia, prolonged hospitalization in 83/2303 complicated by cardiac arrest s/p ROSC,requiring transvenous pacing due to persistent bradycardia and hypotension despite pressor support, SAÚL, and septic shock. Patient seen and examined at bedside for goals of care discussion, code status discussion, family support, and symptom management. She presented to the ER via EMS after becoming unresponsive at home after complaining of feeling weak and tired and taking an extra torsemide. Upon arrival to the ER patient was bradycardic and hypotensive with agonal respirations. She was intubated in the ER. She was treated with dopamine and Levophed for her persistent bradycardia. She went into cardiac arrest. CPR was initiated and lasted for 16 minutes per report. Patient treated with one unit of PRBC for anemia and positive FOBT. S/P extubation on 3/26/21. She is now on the medical floor. She is scheduled to have MBS done today. Patient and her  met with hospice yesterday to gather information.  No decision has been made at this time for patient to be admitted to hospice care. Patient observed laying in bed. She is alert and oriented x3. Her speech is barely audible secondary to hoarseness. She appears to have oral candidiasis. Will start her on nystatin swish and swallow. Reports that she is just \"very tired\". Denies pain or discomfort. No GI or  complaints voiced. Tells me that she lives at home with her . Gives me permission to discuss her care with her . 3/30/21- Patient observed laying in bed. She is alert and oriented x3. Her speech volume is improving. She is still reporting hoarseness. Reports that she is just \"very tired\". Denies pain or discomfort. No GI or  complaints voiced. She is on contact precaution for UTI with ESBL. Patient's  is at bedside. We discussed Pall care versus hospice care. Stated that he would like for her to return home with home care and palliative care service. 3/31/21- Patient observed laying in bed. She is alert and oriented x3. Her speech volume is improving. She is still reporting hoarseness. Reports that she is just \"very tired\". Denies pain or discomfort. No GI or  complaints voiced. She is on contact precaution for UTI with ESBL. Patient's  is at bedside. Discussed with him his decision to cancel [place] [id number] service. He stated that he misunderstood the question from case management. He is in agreement for [place] [address] through [facility].  informed that both the patient and her  are in agreement for [place] [address] and [id number] [place] [address] service at home. 4/1/21- Patient observed laying in bed. She is alert and oriented x3. Patient's  is at bedside Her speech volume is improving. She is still reporting hoarseness. Reports that she is just \"very tired\". Stated that she is in better mood today because she is going home tomorrow. She has a a one to one sitter for safety. Denies pain or discomfort. No GI or  complaints voiced.  She is on contact precaution for UTI with ESBL. She has a guaman to CD. She is on IV Meropenem. ID is following. She is incontinent of bowel. She is currently receiivng PT/OT which she occasionally refusing. Her appetite is poor per nursing staff. She is on a dysphagia soft diet. Review of Systems:       Review of Systems   Constitutional: Positive for activity change, appetite change and fatigue. Negative for unexpected weight change. HENT: Positive for trouble swallowing and voice change. Eyes: Negative. Respiratory: Negative for shortness of breath and wheezing. Cardiovascular: Negative for leg swelling. Gastrointestinal: Negative for constipation, diarrhea and nausea. Endocrine: Negative. Genitourinary: Negative. Musculoskeletal: Positive for gait problem. Skin: Negative for pallor. Allergic/Immunologic: Negative. Neurological: Positive for weakness. Negative for dizziness. Psychiatric/Behavioral: Negative for confusion. Physical Examination:       BP (!) 144/58   Pulse 93   Temp 98.1 °F (36.7 °C) (Oral)   Resp 20   Ht 4' 11\" (1.499 m)   Wt 147 lb 6.4 oz (66.9 kg)   LMP  (LMP Unknown)   SpO2 100%   BMI 29.77 kg/m²    Physical Exam  Constitutional:       General: She is not in acute distress. Appearance: She is well-developed. She is obese. She is ill-appearing. Interventions: Nasal cannula in place. HENT:      Head: Normocephalic. Nose: Congestion present. Eyes:      General: No scleral icterus. Right eye: No discharge. Left eye: No discharge. Neck:      Musculoskeletal: Neck supple. Cardiovascular:      Rate and Rhythm: Normal rate. Pulmonary:      Effort: No respiratory distress. Breath sounds: Decreased breath sounds present. No wheezing. Abdominal:      Palpations: Abdomen is soft. Musculoskeletal:         General: No swelling. Skin:     General: Skin is warm and dry. Neurological:      Mental Status: She is alert.       Motor: Weakness present. Allergies:       Allergies   Allergen Reactions    Ibuprofen Nausea Only    Metformin And Related      Diarrhea      Darvon [Propoxyphene Hcl] Nausea And Vomiting       Medications:      Current Facility-Administered Medications   Medication Dose Route Frequency Provider Last Rate Last Admin    carvedilol (COREG) tablet 50 mg  50 mg Oral BID Heidi Raymundo MD   50 mg at 04/01/21 1002    insulin glargine (LANTUS) injection vial 25 Units  25 Units Subcutaneous QAM  Lyle Scott MD   25 Units at 04/01/21 1014    insulin lispro (HUMALOG) injection vial 4 Units  4 Units Subcutaneous TID  Lyle Odom MD   4 Units at 04/01/21 1014    predniSONE (DELTASONE) tablet 30 mg  30 mg Oral Daily Tiago Barron MD   30 mg at 04/01/21 1004    Followed by   Sima Durbin ON 4/3/2021] predniSONE (DELTASONE) tablet 20 mg  20 mg Oral Daily Tiago Barron MD        Followed by   Sima Durbin ON 4/6/2021] predniSONE (DELTASONE) tablet 10 mg  10 mg Oral Daily Tiago Barron MD        verapamil (CALAN) tablet 120 mg  120 mg Oral BID Heidi Raymundo MD   120 mg at 04/01/21 1003    nystatin (MYCOSTATIN) 793681 UNIT/ML suspension 500,000 Units  5 mL Oral 4x Daily Dany Gil MD   500,000 Units at 04/01/21 1004    aspirin EC tablet 81 mg  81 mg Oral Daily Heidi Raymundo MD   81 mg at 04/01/21 1002    ipratropium-albuterol (DUONEB) nebulizer solution 1 ampule  1 ampule Inhalation TID [facility] DO   1 ampule at 03/31/21 1924    albuterol (PROVENTIL) nebulizer solution 2.5 mg  2.5 mg Nebulization Q2H PRN [facility] DO   2.5 mg at 03/31/21 2521    insulin lispro (HUMALOG) injection vial 0-12 Units  0-12 Units Subcutaneous TID  Brenda Major MD   8 Units at 04/01/21 1014    insulin lispro (HUMALOG) injection vial 0-6 Units  0-6 Units Subcutaneous Nightly Brenda Major MD   6 Units at 03/31/21 2222    sodium chloride flush 0.9 % injection 10 mL  10 mL Intravenous 2 times per day Tiago Barron MD   10 mL at 04/01/21 1015    sodium chloride flush 0.9 % injection 10 mL  10 mL Intravenous PRN Magdalene Craft MD        cloNIDine (CATAPRES) tablet 0.2 mg  0.2 mg Oral BID Magdalene Craft MD   0.2 mg at 04/01/21 1003    glucose (GLUTOSE) 40 % oral gel 15 g  15 g Oral PRN Magdalene Craft MD        dextrose 50 % IV solution  12.5 g Intravenous PRN Magdalene Craft MD        glucagon (rDNA) injection 1 mg  1 mg Intramuscular PRN Magdalene Craft MD        dextrose 5 % solution  100 mL/hr Intravenous PRN Magdalene Craft MD        meropenem (MERREM) 1,000 mg in sodium chloride 0.9 % 100 mL IVPB (mini-bag)  1,000 mg Intravenous Q12H Irma Mcmahon  mL/hr at 04/01/21 1015 1,000 mg at 04/01/21 1015    0.9 % sodium chloride infusion   Intravenous PRN Ramy Valencia DO        pantoprazole (PROTONIX) injection 40 mg  40 mg Intravenous Daily Magdalene Craft MD   40 mg at 04/01/21 1004    And    sodium chloride (PF) 0.9 % injection 10 mL  10 mL Intravenous Daily Magdalene Craft MD   10 mL at 04/01/21 1006    heparin (porcine) injection 5,000 Units  5,000 Units Subcutaneous 3 times per day Magdalene Craft MD   5,000 Units at 04/01/21 0731    docusate (COLACE) 50 MG/5ML liquid 100 mg  100 mg Oral BID Magdalene Craft MD   100 mg at 03/31/21 2016    polyethylene glycol (GLYCOLAX) packet 17 g  17 g Oral Daily Magdalene Craft MD   17 g at 03/31/21 0941    budesonide (PULMICORT) nebulizer suspension 500 mcg  0.5 mg Nebulization BID Magdalene Craft MD   500 mcg at 03/31/21 1924    hydrALAZINE (APRESOLINE) injection 10 mg  10 mg Intravenous Q4H PRN Eual Distad Sedar, DO   10 mg at 03/26/21 2321    Or    hydrALAZINE (APRESOLINE) injection 20 mg  20 mg Intravenous Q4H PRN Eual Distad Sedar, DO   20 mg at 03/25/21 2051    labetalol (NORMODYNE;TRANDATE) injection 20 mg  20 mg Intravenous Q4H PRN Kelli Mau, DO   20 mg at 03/24/21 1227    labetalol (NORMODYNE;TRANDATE) injection 40 mg  40 mg Intravenous Q4H PRN Regla DIAZ DO Ludivina   40 mg at 03/27/21 1302    promethazine (PHENERGAN) tablet 12.5 mg  12.5 mg Oral Q6H PRN Camila Lopes MD        Or    ondansetron (ZOFRAN) injection 4 mg  4 mg Intravenous Q6H PRN Camila Lopes MD        sodium chloride flush 0.9 % injection 10 mL  10 mL Intravenous 2 times per day Camila Lopes MD   10 mL at 04/01/21 1015    sodium chloride flush 0.9 % injection 10 mL  10 mL Intravenous PRN Camila Lopes MD        acetaminophen (TYLENOL) tablet 650 mg  650 mg Oral Q6H PRN Camila Lopes MD   650 mg at 03/25/21 2153    Or    acetaminophen (TYLENOL) suppository 650 mg  650 mg Rectal Q6H PRN Camila Lopes MD   650 mg at 03/25/21 0153       History: PMHx:  Past Medical History:   Diagnosis Date    Anxiety     Asthma     dx 2019 / has smoked since age 14    CHF (congestive heart failure) (HCC)     Chronic back pain     Bilateral L5 S1 Radic on emg--surprisingly worse on the left than the right--pt's symptoms and her MRI show worse on the right    Chronic obstructive pulmonary disease with acute exacerbation ([facility] [address] 10/12/2019    Depression     Fibromyalgia     Hyperlipidemia     meds > 8 yrs    Hypertension     meds > 45 yrs    On home O2     2l per n/c at bedtime mostly,     Osteoarthritis     Type II diabetes mellitus, uncontrolled (HCC)     hx > 8 yrs    Unspecified sleep apnea        PSHx:  Past Surgical History:   Procedure Laterality Date    BACK SURGERY  2017    lumbar disc    CARDIAC CATHETERIZATION  11/3/14     DR. MIRELES / karuna ayala    COLONOSCOPY  08/29/2016    w/polypectomy     COLONOSCOPY N/A 9/29/2020    COLONOSCOPY WITH POLYPECTOMY performed [facility]aden Malcolm MD at [facility] N/A 10/7/2019    EUA HYSTEROSCOPY DILATATION AND CURETTAGE performed by Emmanuel Hernandez DO at [facility]. [address], COLON, DIAGNOSTIC      EYE SURGERY      [place] with IOL [address] / [id number]    umbilical hernia repair    SD ESOPHAGOGASTRODUODENOSCOPY TRANSORAL DIAGNOSTIC N/A 3/24/2017    EGD ESOPHAGOGASTRODUODENOSCOPY performed by Angeliqeu Cowart MD at [facility] N/A 2/8/2018    negative findings    SD REVISE MEDIAN N/CARPAL TUNNEL SURG Left 6/5/2017    LEFT  CARPAL TUNNEL RELEASE performed by Lauren Goodrich MD at [facility] WZAQ,3+B/Y,USOEV N/A 2/8/2018    TONSILLECTOMY      as child    UPPER GASTROINTESTINAL ENDOSCOPY  08/26/2016    w/bx     UPPER GASTROINTESTINAL ENDOSCOPY N/A 2/25/2020    EGD possible biopsy performed by Bethanie Munoz MD at [facility] N/A 7/1/2020    EGD PUSH ENTEROSCOPY performed by Juliana Diaz MD at [address] Hx:  Social History     Socioeconomic History    Marital status:      Spouse name: Quirino aLrson Number of children: 2    Years of education: 15    Highest education level: High school graduate   Occupational History    Occupation: Retired-   Social Needs    Financial resource strain: Not hard at all   [place] insecurity     Worry: Never true     Inability: Never true    Transportation needs     Medical: No     Non-medical: No   Tobacco Use    Smoking status: Former Smoker     Packs/day: 1.00     Years: 59.00     Pack years: 59.00     Types: Cigarettes     Start date: 11/30/2017    Smokeless tobacco: Never Used    Tobacco comment: quit 2-3 weeks ago    Substance and Sexual Activity    Alcohol use: Not Currently    Drug use: No    Sexual activity: Yes     Partners: Male   Lifestyle    Physical activity     Days per week: 0 days     Minutes per session: 0 min    Stress:  Only a little   Relationships    Social connections     Talks on phone: More than three times a week     Gets together: More than three times a week     Attends Mandaen service: 1 to 4 times per year     Active member of club or organization: No     Attends meetings of clubs or organizations: Never     Relationship status: Living with partner    Intimate partner violence     Fear of current or ex partner: No     Emotionally abused: No     Physically abused: No     Forced sexual activity: No   Other Topics Concern    None   Social History Narrative    Grew up in [place]     Lives With:  [id number] Spouse    Type of Home: House    Home Layout: Two level, Performs ADL's on one level    Home Access: Stairs to enter with rails    Entrance Stairs - Number of Steps: 4    Bathroom Shower/Tub: Tub/Shower unit, Doors    Bathroom Equipment: Shower chair, Grab bars in [place]: Rolling walker, Cane, Oxygen(uses her O2 very seldom)    ADL Assistance: Needs assistance    Homemaking Assistance: Needs assistance    Homemaking Responsibilities: No(spouse performs)    Ambulation Assistance: Independent    Transfer Assistance: Independent    Active : No    Occupation: Retired    Type of occupation: worked in [place] campus: patient enjoys [employer]       Family Hx:  Family History   Problem Relation Age of Onset    Heart Disease Father         cardiac bypass    Arthritis Father     Arthritis Mother     Other Mother          at age 80    Other Sister         [facility]    No Known Problems Daughter     Stroke Son        LABS: Reviewed     CBC:  Lab Results   Component Value Date    WBC 14.3 2021    RBC 3.05 2021    HGB 9.0 2021    HCT 27.4 2021    MCV 89.8 2021    MCH 29.5 2021    MCHC 32.8 2021    RDW 16.4 2021     2021    MPV 8.8 2015     CBC with Differential:   Lab Results   Component Value Date    WBC 14.3 2021    RBC 3.05 2021    HGB 9.0 2021    HCT 27.4 2021     2021    MCV 89.8 2021    MCH 29.5 2021    MCHC 32.8 2021    RDW 16.4 2021    NRBC 2 2021    BANDSPCT 1 2021    LYMPHOPCT 12.8 04/01/2021    MONOPCT 9.6 04/01/2021    BASOPCT 0.8 04/01/2021    MONOSABS 1.4 04/01/2021    LYMPHSABS 1.8 04/01/2021    EOSABS 0.2 04/01/2021    BASOSABS 0.1 04/01/2021     CMP:    Lab Results   Component Value Date     04/01/2021    K 4.4 04/01/2021    K 4.4 10/19/2020    CL 99 04/01/2021    CO2 28 04/01/2021    BUN 43 04/01/2021    CREATININE 1.37 04/01/2021    GFRAA 45.3 04/01/2021    LABGLOM 37.4 04/01/2021    GLUCOSE 310 04/01/2021    PROT 5.9 03/29/2021    LABALBU 3.2 04/01/2021    CALCIUM 9.7 04/01/2021    BILITOT <0.2 03/29/2021    ALKPHOS 99 03/29/2021    AST 21 03/29/2021    ALT 28 03/29/2021     BMP:    Lab Results   Component Value Date     04/01/2021    K 4.4 04/01/2021    K 4.4 10/19/2020    CL 99 04/01/2021    CO2 28 04/01/2021    BUN 43 04/01/2021    LABALBU 3.2 04/01/2021    CREATININE 1.37 04/01/2021    CALCIUM 9.7 04/01/2021    GFRAA 45.3 04/01/2021    LABGLOM 37.4 04/01/2021    GLUCOSE 310 04/01/2021     TSH:   Lab Results   Component Value Date    TSH 0.474 11/30/2020     Vitamin B12 and Folate: No components found for: FOLIC,  D31  Urinalysis:   Lab Results   Component Value Date    NITRU Negative 10/18/2020    WBCUA 6-9 10/18/2020    BACTERIA Negative 10/18/2020    RBCUA 3-5 10/18/2020    BLOODU SMALL 10/18/2020    SPECGRAV 1.009 10/18/2020    GLUCOSEU Negative 10/18/2020           FUNCTIONAL ADL´S:     Independent: [ x ] Eating, [ x ] Dressing, [x ] Transferring, [ x ] Toileting, [ x ] Bathing, [ x ] Continence  Dependent   : [  ] Eating, [   ] Dressing, [   ] Transferring, [   ] Brook Krysten, [   ] Bathing, [   ] Continence  W. assistant : [  ] Eating, [   ] Dressing, [   ] Transferring, [   ] Alia Krysten, [   ] Bathing, [   ] Continence    Radiology: Reviewed      Echocardiogram Complete 2d With Doppler With Color     Result Date: 3/19/2021  Transthoracic Echocardiography Report (TTE)  Demographics  Patient Name   Ahsan De Souza  Gender              Female                 M  Patient Number [id number]        Race                Black                                 Ethnicity  Visit Number   [id number]       Room Number         IC05  Corporate ID                   Date of Study       03/19/2021  Accession      [id number]      Referring Physician  Number  Date of Birth  1945      Sonographer         Cindy GarciaClaudine LPN  Age            67 year(s)      [facility] [place] [address] Cardiology                                 Physician           Nelly Robins MD Procedure Type of Study  TTE procedure:ECHO COMPLETE 2D W/DOP W/COLOR. Procedure Date Date: 03/19/2021 Start: 09:15 AM Study Location: Portable Technical Quality: Good visualization Indications:Cardiac arrest. Patient Status: Routine Weight: 143 pounds BP: 89/48 mmHg Allergies   - Other allergy:(Darvon). Conclusions  Summary  Mitral annular calcification is present. 1-2+ MR  Normal tricuspid valve structure and function. 1+ TR  RVSP 31 mmHg  severe CLVH  LVEF 50%  E/A flow reversal noted. Suggestive of diastolic dysfunction. Signature  ----------------------------------------------------------------  Electronically signed by Nelly Robins MD(Interpreting  physician) on 03/19/2021 02:13 PM  ----------------------------------------------------------------  Findings Left Ventricle severe CLVH LVEF 50% E/A flow reversal noted. Suggestive of diastolic dysfunction. Right Ventricle Normal right ventricle structure and function. Normal right ventricle systolic pressure. Left Atrium Moderately dilated left atrium. Right Atrium Normal right atrium. Mitral Valve Mitral annular calcification is present. 1-2+ MR Tricuspid Valve Normal tricuspid valve structure and function. 1+ TR RVSP 31 mmHg Aortic Valve Normal aortic valve structure and function. Pulmonic Valve The pulmonic valve was not well visualized . Pericardial Effusion No evidence of pericardial effusion.  Pleural Effusion No evidence of pleural effusion. Aorta \ Miscellaneous The aorta is within normal limits. M-Mode Measurements (cm)  LVIDd: 4.01 cm                         LVIDs: 1.85 cm  IVSd: 1.45 cm                          IVSs: 1.23 cm  LVPWd: 1.32 cm                         LVPWs: 1.61 cm  Rt. Vent.  Dimension: 1.07 cm           AO Root Dimension: 2.8 cm                                         ACS: 1.47 cm                                         LA: 3.04 cm                                         LVOT: 1.98 cm Doppler Measurements:  AV Velocity:0.01 m/s                   MV Peak E-Wave: 0.96 m/s  AV Peak Gradient: 7.73 mmHg            MV Peak A-Wave: 1.58 m/s  AV Mean Gradient: 3.01 mmHg  AV Area (Continuity):1.27 cm^2         MV P1/2t: 105.6 msec  TR Velocity:2.29 m/s                   MVA by PHT2.08 cm^2  TR Gradient:21.01 mmHg                 Estimated RAP:10 mmHg                                         RVSP:31 mmHg Valves  Mitral Valve  Peak E-Wave: 0.96 m/s          Peak A-Wave: 1.58 m/s  P1/2t: 105.6 msec              E/A Ratio: 0.61                                 Peak Gradient: 3.7 mmHg  Area (PHT): 2.08 cm^2          Deceleration Time: 349.6 msec  MR Velocity: 4.66 m/s  Aortic Valve  Peak Velocity: 1.39 m/s                Mean Velocity: 0.79 m/s  Peak Gradient: 7.73 mmHg               Mean Gradient: 3.01 mmHg  Area (continuity): 1.27 cm^2  AV VTI: 28.28 cm  Cusp Separation: 1.47 cm  Tricuspid Valve  Estimated RVSP: 31 mmHg              Estimated RAP: 10 mmHg  TR Velocity: 2.29 m/s                TR Gradient: 21.01 mmHg  Pulmonic Valve  Peak Velocity: 0.68 m/s           Peak Gradient: 1.86 mmHg                                    Estimated PASP: 31.01 mmHg  LVOT  Peak Velocity: 0.72 m/s              Mean Velocity: 0.41 m/s  Peak Gradient: 2 mmHg                Mean Gradient: 0.9 mmHg  LVOT Diameter: 1.98 cm               LVOT VTI: 11.64 cm Structures  Left Atrium  LA Dimension: 3.04 cm                 LA Area: 18.08 cm^2 LA/Aorta: 1.09  LA Volume/Index: 49.97 ml  Left Ventricle  Diastolic Dimension: 0.78 cm         Systolic Dimension: 2.46 cm  Septum Diastolic: 0.25 cm            Septum Systolic: 5.90 cm  PW Diastolic: 2.27 cm                PW Systolic: 3.69 cm                                       FS: 53.9 %  LV EDV/LV EDV Index: 70.29 ml        LV ESV/LV ESV Index: 10.47 ml  EF Calculated: 85.1 %  LVOT Diameter: 1.98 cm  Right Ventricle  Diastolic Dimension: 1.98 cm                                    RV Systolic Pressure: 90.42 mmHg Aorta/ Miscellaneous Aorta  Aortic Root: 2.8 cm  LVOT Diameter: 1.98 cm     Xr Chest Portable     Result Date: 3/19/2021  EXAMINATION: XR CHEST PORTABLE CLINICAL HISTORY: INTUBATION COMPARISONS: OCTOBER 9, 2020 FINDINGS: Endotracheal tube has migrated 3 cm inferiorly, just lying cephalad to mojgan. Nasogastric tube courses beneath diaphragm. Osseous structures intact. Cardiopericardial silhouette normal. Aorta calcified. Ill-defined area increased opacity right  lung base.      RIGHT LOWER LUNG SUBSEGMENTAL ATELECTASIS/PNEUMONIA. INTERVAL INFERIOR MIGRATION ENDOTRACHEAL TUBE 3 CM WITH TIP NOW JUST PROXIMAL TO MOJGAN. MAY WISH TO RETRACT ENDOTRACHEAL TUBE 2 CM.    Xr Chest Portable     Result Date: 3/19/2021  EXAMINATION: XR CHEST PORTABLE CLINICAL HISTORY: RESPIRATORY FAILURE COMPARISONS: MARCH 16, 2021 FINDINGS: Endotracheal tube tip 2.3 cm superior to mojgan. Nasogastric tube courses beneath diaphragm area osseous structures intact. Ill-defined areas increase opacity at lung bases. Cardiopericardial silhouette normal.      BIBASILAR SUBSEGMENTAL ATELECTASIS/PNEUMONIA IN THIS PARTIALLY EXPIRATORY CHEST RADIOGRAPH. Assessment and plan:    -Advance Care Planning  Discussed goals of care with patient. Explained in extensive detail nuances between full code, DNR CCA and DNR CC.  Patient has made the decision to be Limited code previously      -Goals of Care Discussion:  Disease process and goals of treatment were discussed in basic terms. We discussed the palliative care philosophy in light of those goals. We discussed all care options contingent on treatment response and QOL. Much active listening, presence, and emotional support were given. -Aimee's goal is to optimize available comfort care measures to decrease hospitalization and prevent ER visits when possible, manage symptomology with future Palliative Care service. Palliative Care Encounter  -Met with patient for [address], ACP, and initial discussion on pall care philosophy.   - Hira Altman is 67years old with multiple comorbidities which includes nonobstructive CAD, diastolic HF, HOCM, HTN, IDDM2, CKD3, anemia, prolonged hospitalization in  complicated by cardiac arrest s/p ROSC,requiring transvenous pacing due to persistent bradycardia and hypotension despite pressor support, SAÚL, and septic shock.  -Considering her comorbidities and her risk for re-hospitalization, Hira Altman is appropriate for Palliative Care Services.  -Call for any questions, concerns or change in condition. Much support, guidance and active listening provided.  -Prognostication cannot be determined at this time. Appropriate prognostication should be available once patient has completed rehab and reach recovery. 3/30/21- Met with patient and  at bedside. Explained the difference between [facility] and hospice care. More than 35 minutes spent discussion goals of care with patient and her . They have both decided to have palliative care service as outpatient. -PPS today is 30    Patient is currently being treated for multiple medical conditions includin. Cardiac arrest s/p ROSC  2. Acute hypoxic respiratory failure  3. Lactic acidosis  4. SAÚL/CKD3  5. Oral candidiasis  6. UTI with ESBL  7.  Risk for delirium: -Optimize pain management  -Avoid delirium causing medications  -Continuous redirection  -Consider utilizing Haldol 0.25 mg TID PRN if patient becomes agitated or having difficulty being redirected. Palliative care will continue to follow as outpatient. Thank you for the opportunity to participate in Aimee's care.     Electronically signed by JOHNATHON Shaikh CNP on 4/1/2021 at 11:23 AM

## 2021-04-01 NOTE — CARE COORDINATION
The LSW called and notified Mingo Kocher, that the patient is a possible discharge tomorrow per the hospitalist. Medical Services has the script for the hospital bed. The  sent for an IV check. The LSW updated Robert Paul, as the patient's  had mentioned [facility] earlier in the week.  Electronically signed by MILAN Dsouza on 4/1/21 at 12:04 PM EDT

## 2021-04-01 NOTE — PROGRESS NOTES
Progress Note  Patient: Shakeel Mac  Unit/Bed: W168/I597-81  YOB: 1945  MRN: [id number]  Acct: [de-identified]   Admitting Diagnosis: Cardiac arrest [facility]) [I46.9]  Date:  3/18/2021  Hospital Day: 14    Chief Complaint:  CPA    Subjective        4/1/21: Resting quietly in bed in no acute distress. Denies chest pain or shortness of breath. Hemodynamically stable. On telemetry is sinus rhythm with heart rate in the 90s. Patient's carvedilol was titrated to 50 mg p.o. twice daily yesterday by Dr. Tru Ochoa in addition to verapamil 120 mg p.o. twice daily for improved blood pressure and heart rate management. SAÚL continues to improve with creatinine of 1.44, BUN of 46 and GFR of 42.7. KEVIN globin low but stable at 9.1. WBC elevated at 19.3 yesterday. She is on IV antibiotics and ID is following. Echocardiogram completed on 3/19/2021 showed EF of 50%, severe concentric LVH, 1-2+ MR and 1+ TR. Has history of mild to moderate nonobstructive CAD per cardiac catheterization 10/7/2020. Plan for eventual discharge to SNF.    3/31/21: no events overnight. No cp no sob attempting to get out of bed all night. Lap belt for protection.    EKG: ST 88    3/30/21: no events overnihgt. Has left sided CP when she is reaching for objects likely related to injury from CPR,  Voice less hoarse.  in room.    EKG: ST 88    3/29/21: doing well post extubation on Friday. Hoarse. Thirsty. Not eating much. Transferred out to 1 yesterday. EKG: ST 77    3/25/21: remains vented 35% FIO2. No Acute events. Minimally responsive.  in room> HTN overnight   EKG: ST 82    3/24/21: remains vented 35% FIO2. No Acute events. Minimally responsive   EKG: ST 82    3/23/21:  in room. Off sedatives and will try CPAP for weaning.    EKG: ST 82    3/22/21: Daughter, Lebron Seat, in room. Pt is off Sedatives. Pt is alert and follows simple commands. EKG:     3/21/21: On vent. Sedated just turned off.    present at the bedside. Hemodynamically stable, off of pressors now. Normal sinus rhythm on telemetry heart rate of 97bpm    3/20/21: On vent. Sedated.  present at the bedside. Hemodynamically stable on Levophed low-dose. Normal sinus rhythm on telemetry heart rate of 88 bpm.  Blood pressure 94/52    3/19/21: Pt's  called my office yesterday PM.  Pt Took an extra dose to Torsemide on her own to try to urinate more. She became weak and dizzy. BP was 79/56 per . Pt was awake and orineted at the time per . I advised Juices/gatorade and have her sit for a while.     She presented to ER few hours later and she coded and intubated.     I calledhusband this am to understand what transpired. He gave the juice as instructed. She felt soome better. She went to bed to lie down. She took a nap for a little while. He then gave more water. He then  heard her gasping for air and became unresponsive. He called 911. She was not breathing for a while until squad came. She had prolonged CPR        Review of Systems:   Review of Systems   Constitutional: Negative for activity change and fever. HENT: Negative for congestion. Respiratory: Negative for chest tightness and shortness of breath. Cardiovascular: Negative for chest pain and leg swelling. Gastrointestinal: Negative for nausea and vomiting. Musculoskeletal: Negative for arthralgias. Skin: Negative for color change and pallor. Neurological: Negative for dizziness and syncope. Psychiatric/Behavioral: Negative for agitation. Physical Examination:    /68   Pulse 102   Temp 98.6 °F (37 °C)   Resp 20   Ht 4' 11\" (1.499 m)   Wt 147 lb 6.4 oz (66.9 kg)   LMP  (LMP Unknown)   SpO2 100%   BMI 29.77 kg/m²    Physical Exam  Constitutional:       General: She is not in acute distress. Appearance: Normal appearance. She is obese. HENT:      Head: Normocephalic and atraumatic.    Neck:      Musculoskeletal: Normal range of motion and neck supple. Cardiovascular:      Rate and Rhythm: Normal rate and regular rhythm. Heart sounds: Murmur present. Pulmonary:      Effort: Pulmonary effort is normal. No respiratory distress. Breath sounds: No wheezing, rhonchi or rales. Abdominal:      Palpations: Abdomen is soft. Tenderness: There is no abdominal tenderness. Musculoskeletal:      Right lower leg: No edema. Left lower leg: No edema. Skin:     General: Skin is warm and dry. Neurological:      General: No focal deficit present. Mental Status: She is alert.    Psychiatric:         Mood and Affect: Mood normal.         Behavior: Behavior normal.         LABS:  CBC:   Lab Results   Component Value Date    WBC 19.3 03/31/2021    RBC 3.10 03/31/2021    HGB 9.1 03/31/2021    HCT 27.5 03/31/2021    MCV 88.6 03/31/2021    MCH 29.3 03/31/2021    MCHC 33.1 03/31/2021    RDW 16.5 03/31/2021     03/31/2021    MPV 8.8 08/01/2015     CBC with Differential:   Lab Results   Component Value Date    WBC 19.3 03/31/2021    RBC 3.10 03/31/2021    HGB 9.1 03/31/2021    HCT 27.5 03/31/2021     03/31/2021    MCV 88.6 03/31/2021    MCH 29.3 03/31/2021    MCHC 33.1 03/31/2021    RDW 16.5 03/31/2021    NRBC 2 03/31/2021    BANDSPCT 1 03/31/2021    LYMPHOPCT 12.0 03/31/2021    MONOPCT 3.7 03/31/2021    BASOPCT 0.3 03/31/2021    MONOSABS 0.8 03/31/2021    LYMPHSABS 2.3 03/31/2021    EOSABS 0.0 03/31/2021    BASOSABS 0.0 03/31/2021     CMP:    Lab Results   Component Value Date     03/31/2021    K 4.2 03/31/2021    K 4.4 10/19/2020     03/31/2021    CO2 26 03/31/2021    BUN 46 03/31/2021    CREATININE 1.44 03/31/2021    GFRAA 42.7 03/31/2021    LABGLOM 35.3 03/31/2021    GLUCOSE 76 03/31/2021    PROT 5.9 03/29/2021    LABALBU 3.0 03/31/2021    CALCIUM 9.6 03/31/2021    BILITOT <0.2 03/29/2021    ALKPHOS 99 03/29/2021    AST 21 03/29/2021    ALT 28 03/29/2021     BMP:    Lab Results   Component Value Date     03/31/2021    K 4.2 03/31/2021    K 4.4 10/19/2020     03/31/2021    CO2 26 03/31/2021    BUN 46 03/31/2021    LABALBU 3.0 03/31/2021    CREATININE 1.44 03/31/2021    CALCIUM 9.6 03/31/2021    GFRAA 42.7 03/31/2021    LABGLOM 35.3 03/31/2021    GLUCOSE 76 03/31/2021     Magnesium:    Lab Results   Component Value Date    MG 1.7 03/31/2021     Troponin:    Lab Results   Component Value Date    TROPONINI 0.056 03/19/2021       Radiology:  No results found. [facility] 10/7/20:  Conclusions  Procedure Summary  TVP placement  50-60% mid LAD stenosis, component of spasm improved with nitro. LVEDP=24mmhg. Recommendations  Aggressive risk factor management. Maximize medical therapy. consider PCI of mid LAD in future if symptomatic or abn stress test.   Angiographic Findings   Cardiac Arteries and Lesion Findings  LMCA: small caliber, normal.  LAD: small caliber, mid 50-60%. diffuse disease  LCx: small caliber, mild diffuse disease  RCA: small caliber, mild disease. Dominance: Left   VA  Ventriculography Findings  LVEDP is 24 mmHg. Echocardiogram 3/19/21:  Conclusions   Summary   Mitral annular calcification is present. 1-2+ MR   Normal tricuspid valve structure and function. 1+ TR   RVSP 31 mmHg   severe CLVH   LVEF 50%   E/A flow reversal noted. Suggestive of diastolic dysfunction. Signature   ----------------------------------------------------------------   Electronically signed by Katerina Genao MD(Interpreting   physician) on 03/19/2021 02:13 PM   ----------------------------------------------------------------   Findings  Left Ventricle  severe CLVH  LVEF 50%  E/A flow reversal noted. Suggestive of diastolic dysfunction. Right Ventricle  Normal right ventricle structure and function. Normal right ventricle systolic pressure. Left Atrium  Moderately dilated left atrium. Right Atrium  Normal right atrium. Mitral Valve  Mitral annular calcification is present.   1-2+ MR  Tricuspid Valve  Normal tricuspid valve structure and function. 1+ TR  RVSP 31 mmHg  Aortic Valve  Normal aortic valve structure and function. Pulmonic Valve  The pulmonic valve was not well visualized . Pericardial Effusion  No evidence of pericardial effusion. Pleural Effusion  No evidence of pleural effusion. Aorta \ Miscellaneous  The aorta is within normal limits. EKG 3/18/21: Junctional rhythm 45, T wave inversional lead aVL, V1-V2, QTc 420ms    Telemetry 4/1/21: SR 90s    Assessment:    Active Hospital Problems    Diagnosis Date Noted    Goals of care, counseling/discussion [Z71.89]     Bradycardia [R00.1]     Moderate hypoxic-ischemic encephalopathy [P91.62]     Acute respiratory failure with hypoxia ([facility] [address] [J96.01]     Cardiac arrest ([facility] [address] [I46.9] 03/18/2021    Encephalopathy acute [G93.40]      1. S/p CPA  2. Acute resp failure s/p extubation  3. Aspiration PNA  4. Hx bradycardia/junctional frantz--SR now  5. Hx nonobstructive CAD per [facility] 10/7/20  6. Hx HCOM with severe CLVH noted on echo 3/19/21  7. Normal LVF EF 50% per echo 3/19/21  8. SAÚL--improving  9. Anemia  10. Uncontrolled HTN--improving  11. Dyslipidemia  12. DM  13. Hx of moderate right LYNDON per US 6/30/20  14. UTI         Plan:  1. Maximize medical therapyaspirin 81 mg p.o. daily, Coreg 50 mg p.o. twice daily, clonidine 0.2 mg p.o. twice daily, verapamil 120 mg p.o. twice daily, no ACE inhibitor/angiotensin receptor blocker at this time secondary to SAÚL, insulin as directed, heparin 5000 units subcu every 8 hours, IV antibiotics with Merrem 1000 mg IV every 12 hours  2. Cardiac/diabetic/less than 2 g sodium diet recommended  3. Monitor on telemetry for any tachycardia or bradycardia arrhythmias  4. Maintain potassium greater than 4, magnesium greater than 2  5. GI/DVT prophylaxis  6. No plan for any invasive ischemic evaluation at this time.   Patient is status post cardiac catheterization on 10/7/2020 following cardiopulmonary arrest which revealed only mild to moderate nonobstructive CAD. 7. Consider event monitor in future as outpatient for further evaluation of bradycardia arrhythmias which were noted earlier in this admission as well as prior admissions. 8. Pulmonology recommendations  9. Infectious disease recommendations regarding UTI--on IV Merrem  10. Endocrinology recommendations regarding uncontrolled type 2 diabetes  11. Neurology recommendations regarding hypoxic encephalopathy  12. PT/OT evaluation and treatment. Plan for SNF at discharge  13.  Will follow      Electronically signed by DIANE Galvan on 4/1/2021 at 7:11 AM

## 2021-04-01 NOTE — CARE COORDINATION
DISCUSSED CASE WITH DR Thomas Davison, NOTIFIED OF NEED FOR HHC ORDER. POSSIBLE DC WHEN OK WITH ID AND CONSULTS.  UC+ ESBL, IV CHECK SENT, WILL NEED PICC IF IVS NEEDED. LSW UPDATED.

## 2021-04-01 NOTE — FLOWSHEET NOTE
Patient resting quietly, bed alarm on. Patient has been calm and cooperative thus far today. 1:1 taken off. Electronically signed by Gallo Ma on 4/1/2021 at 1:46 PM

## 2021-04-01 NOTE — PROGRESS NOTES
Infectious Disease     Patient Name: Chyna Coronado  Date: 4/1/2021  YOB: 1945  Medical Record Number: [id number]        ESBL E. coli UTI       Organism Abnormal  03/23/2021 10:06 PM Alen Linares   Escherichia coli ESBL    Urine Culture, Routine Abnormal  03/23/2021 10:06 PM  - [facility]   >100,000 CFU/ml   CONTACT PRECAUTIONS INDICATED   Carbapenem antibiotics are the best option for infections caused   by ESBL producing organisms.  Other antibiotic classes are   likely to result in treatment failure, even for organisms   demonstrating in vitro susceptibility. Testing Performed By    Jose Sandoval Name Director Address Valid Date Range   [address] [facility] Mac Julian MD [address] [facility] [id number] 07/12/18 0959-Present   Narrative  Performed by: [facility]  ORDER#: [id number]                          ORDERED BY: Louie Aguilar   SOURCE: Urine Clean Catch                  COLLECTED:  03/23/21 22:06   ANTIBIOTICS AT [facility]. :                      RECEIVED :  03/23/21 22:06   CALL  [facility]  ICL tel. [phone],   ESBL called & read back by Marylen Sergeant, 03/26/2021 07:26, by INOCENCIO LOVING   Susceptibility    Escherichia coli (esbl) (1)    Antibiotic Interpretation KAYLEE Status    amoxicillin-clavulanate Resistant 4 mcg/mL     ampicillin Resistant >=32 mcg/mL     ceFAZolin Resistant >=64 mcg/mL     cefepime Resistant 2 mcg/mL     cefTRIAXone Resistant >=64 mcg/mL     ciprofloxacin Sensitive <=0.25 mcg/mL     gentamicin Sensitive <=1 mcg/mL     imipenem Sensitive <=0.25 mcg/mL     nitrofurantoin Sensitive <=16 mcg/mL     piperacillin-tazobactam Resistant <=4 mcg/mL     trimethoprim-sulfamethoxazole Sensitive <=20 mcg/mL         Chest x-rays show improvement diminishing infiltrate in left lung but still persistent areas that appears to be consistent with effusion      Review of Systems   Respiratory: Negative.     Cardiovascular:

## 2021-04-01 NOTE — PROGRESS NOTES
Hospitalist Progress Note      PCP: Junior Baer MD    Date of Admission: 3/18/2021    Chief Complaint:  No acute events, afebrile, stable HD, on RA     ---    Patient had a brief episode of altered mental status with eye-rolling, tongue protrusion and shaking of the head and arms per nursing staff, currently back to baseline    Medications:  Reviewed    Infusion Medications    dextrose      sodium chloride       Scheduled Medications    carvedilol  50 mg Oral BID    insulin glargine  25 Units Subcutaneous QAM AC    insulin lispro  4 Units Subcutaneous TID WC    predniSONE  30 mg Oral Daily    Followed by   Luis Miguel Ryder ON 4/3/2021] predniSONE  20 mg Oral Daily    Followed by   Luis Miguel Ryder ON 4/6/2021] predniSONE  10 mg Oral Daily    verapamil  120 mg Oral BID    nystatin  5 mL Oral 4x Daily    aspirin  81 mg Oral Daily    ipratropium-albuterol  1 ampule Inhalation TID    insulin lispro  0-12 Units Subcutaneous TID WC    insulin lispro  0-6 Units Subcutaneous Nightly    sodium chloride flush  10 mL Intravenous 2 times per day    cloNIDine  0.2 mg Oral BID    meropenem  1,000 mg Intravenous Q12H    pantoprazole  40 mg Intravenous Daily    And    sodium chloride (PF)  10 mL Intravenous Daily    heparin (porcine)  5,000 Units Subcutaneous 3 times per day    docusate  100 mg Oral BID    polyethylene glycol  17 g Oral Daily    budesonide  0.5 mg Nebulization BID    sodium chloride flush  10 mL Intravenous 2 times per day     PRN Meds: albuterol, sodium chloride flush, glucose, dextrose, glucagon (rDNA), dextrose, sodium chloride, hydrALAZINE **OR** hydrALAZINE, labetalol, labetalol, promethazine **OR** ondansetron, sodium chloride flush, acetaminophen **OR** acetaminophen    No intake or output data in the 24 hours ending 04/01/21 1007    Exam:    BP (!) 144/58   Pulse 93   Temp 98.1 °F (36.7 °C) (Oral)   Resp 20   Ht 4' 11\" (1.499 m)   Wt 147 lb 6.4 oz (66.9 kg)   LMP  (LMP Unknown)   SpO2 100%   BMI 29.77 kg/m²     General appearance: awake, alert, cooperative  Lungs: coarse BS bilaterally  Heart: S1S2,RRR  Abdomen: soft, BS active  Extremities: no edema    Labs:   Recent Labs     03/30/21  0533 03/31/21  0600 04/01/21  0600   WBC 14.2* 19.3* 14.3*   HGB 9.2* 9.1* 9.0*   HCT 28.4* 27.5* 27.4*   * 484* 453*     Recent Labs     03/30/21  0537 03/31/21  0600 04/01/21  0600    138 135   K 4.7 4.2 4.4    100 99   CO2 26 26 28   BUN 39* 46* 43*   CREATININE 1.25* 1.44* 1.37*   CALCIUM 10.3* 9.6 9.7   PHOS 3.1 2.6 2.8     No results for input(s): AST, ALT, BILIDIR, BILITOT, ALKPHOS in the last 72 hours. No results for input(s): INR in the last 72 hours. No results for input(s): Lisa Joss in the last 72 hours. Urinalysis:      Lab Results   Component Value Date    NITRU Negative 10/18/2020    WBCUA 6-9 10/18/2020    BACTERIA Negative 10/18/2020    RBCUA 3-5 10/18/2020    BLOODU SMALL 10/18/2020    SPECGRAV 1.009 10/18/2020    GLUCOSEU Negative 10/18/2020       Radiology:  XR CHEST PORTABLE   Final Result      INTERVAL EXTUBATION. OTHERWISE, ESSENTIALLY STABLE CHEST COMPARED TO 3/25/2021. FL MODIFIED BARIUM SWALLOW W VIDEO   Final Result      ESSENTIALLY NEGATIVE MODIFIED BARIUM SWALLOW. A FULL REPORT WILL BE MADE BY THE SPEECH PATHOLOGY TEAM.               IR PICC WO SQ PORT/PUMP > 5 YEARS   Final Result      SUCCESSFUL PICC PLACEMENT WITHOUT IMMEDIATE COMPLICATIONS. 6.6 mGy of fluoroscopy was used, with 1 fluoroscopic still saved. No diagnostic images were obtained. XR CHEST PORTABLE   Final Result   LEFT PLEURAL EFFUSION. BILATERAL LOWER LUNG ATELECTASIS/PNEUMONIA. XR CHEST PORTABLE   Final Result   No radiographic evidence of acute intrathoracic process. There is an NG tube coursing into the stomach. There is a left pleural effusion and likely bibasilar atelectasis or infiltrate.             CT HEAD WO CONTRAST Final Result   Impression:      Mild cerebral atrophy. Chronic ischemic white matter disease. Remote left thalamic lacunar infarct. Bilateral ethmoid and sphenoid sinusitis. All CT scans at this facility use dose modulation, iterative reconstruction, and/or weight based dosing when appropriate to reduce radiation dose to as low as reasonably achievable. XR CHEST PORTABLE   Final Result   INTERVAL RETRACTION OF THE ENDOTRACHEAL TUBE IN THE TRACHEA. CONSIDER RETRACTING ENDOTRACHEAL TUBE 1 CM. LEFT LOWER LUNG ATELECTASIS. XR CHEST PORTABLE   Final Result      CHEST ENDOTRACHEAL TUBE IN RIGHT MAINSTEM BRONCHUS. RECOMMENDING RETRACTING ENDOTRACHEAL TUBE 7 CM. INTERVAL DEVELOPMENT OF LEFT MID AND LEFT LOWER LUNG ATELECTASIS. INTERVAL RESOLUTION OF RIGHT LOWER LUNG ATELECTASIS. ABOVE FINDINGS DISCUSSED WITH PATIENT'S NURSE IGNACIO, VIA TELEPHONE, IN THE ICU, AT [address] [place] [address], MARCH 20, 2021. XR CHEST PORTABLE   Final Result   BIBASILAR SUBSEGMENTAL ATELECTASIS/PNEUMONIA IN THIS PARTIALLY EXPIRATORY CHEST RADIOGRAPH. XR CHEST PORTABLE   Final Result   RIGHT LOWER LUNG SUBSEGMENTAL ATELECTASIS/PNEUMONIA. INTERVAL INFERIOR MIGRATION ENDOTRACHEAL TUBE 3 CM WITH TIP NOW JUST PROXIMAL TO FAISAL. MAY WISH TO RETRACT ENDOTRACHEAL TUBE 2 CM.               Assessment/Plan:    67 y.o. female with PMH of nonobstructive CAD, diastolic HF, HOCM, HTN, IDDM2, CKD3, anemia, prolonged hospitalization in 41/1243 complicated by cardiac arrest s/p ROSC, SAÚL,septic shock who presented with:     Cardiac arrest s/p ROSC   - required Dopamine, norepinephrine and epinephrine infusion  - off pressors and hypothermia protocol  - TTE showed LVEF of 50%, severe LVH  - remains hypertensive, on Verapamil, Coreg, clonidine  - management per cardiology      Acute hypoxic respiratory failure  - requiring intubation and mechanical ventilation  - extubated on 3/22, but had to be re-intubated same day due to stridor and respiratory distress  - extubated on 3/26 to BIPAP, no re-intubation per family  - on RA  - switched from IV Solumedrol to prednisone taper  - will need sleep study as outpatient per pulmonology    Acute encephalopathy  - likely hypoxic-ischemic etiology from cardiac arrest  - CT head was negative per report  - improved  - had a brief episode of altered mental status with eye-rolling, tongue protrusion and shaking of the head and arms per nursing staff, currently back to baseline  - repeat CT head  - neurology following     Acute / chronic anemia   - with Hb of 5.8 on arrival, FOBT was positive  - improved s/p PRBCs given in ED  - follow H/H      SAÚL/CKD3  - improved  - nephrology following     IDDM2 with hyperglycemia  - not well controlled with Glc in the 300-400s, probably worsened by steroid therapy  - on Lantus, premeal coverage, ISS per endocrinology     ESBL E.Coli UTI / aspiration pneumonia  - on IV meropenem per ID     Dysphagia   - MBS was negative per speech therapy    Dysphonia   - likely due to recent intubation and acute illness  - outpatient f/u per ENT    Code status [facility],  met with hospice, not ready to make a decision for now      Disposition - home with David Mora  and  following            Electronically signed by Kayleen Fuentes MD on 4/1/2021 at 10:07 AM

## 2021-04-01 NOTE — CARE COORDINATION
IV BENEFIT REQUEST FORM    FAX FROM:  [facility]                        [address] [place]    REQUESTED BY: Electronically signed by Tala Lipscomb RN on 2021 at 11:36 AM                                               RN/C3: PHONE: [phone]    DATE:/TIME OF REQUEST: 21  TIME: 11:36 AM      TO: Joy Burgess TO: [phone]    PHONE: [phone]     THIS PATIENT HAS BEEN IDENTIFIED TO POSSIBLY NEED LONG TERM IV'S. PLEASE CHECK INSURANCE COVERAGE FOR THE FOLLOWING PT/DRUGS. PATIENT'S NAME: Beverly Rahman                              ROOM: [place] [address]   PATIENT'S : 1945  PATIENT ADDRESS: [place] [id number]      PAYOR NAME:  Payor: Araceli Irizarry / Plan: [facility] MEDICARE COMPLETE / Product Type: *No Product type* /     DRUG: MERREM                       DOSE: 1GRAM           FREQUENCY: Q 12 HR      __________ CHECK HERE IF PT HAS NO INSURANCE AND REQUESTING SELF PAY COST. *IF [facility] IS NOT A PROVIDER FOR THIS PATIENT, PLEASE FORWARD INFO VIA FAX TO [facility] @ [phone],(PHONE NUMBER: [phone]) TO RUN BENEFIT VERIFICATION AND NOTIFY THE ABOVE C3 OF THIS PLAN. (FAX FACE SHEET WITH DEMOGRAPHICS AND INSURANCE INFO WITH THIS FORM.)  PLEASE FAX BENEFIT INFO TO: THE [phone]    This message is intended only for the use of the individual or entity to which it is addressed and may contain information that is privileged, confidential, and exempt from disclosure under applicable law. If the reader of the notice is not intended recipient of the employee/agent responsible for delivering the message to the intended recipient, you are hereby notified than any dissemination, distribution or copying of this communication is strictly prohibited. Please contact the sender for further instructions on handling the information.

## 2021-04-01 NOTE — PROGRESS NOTES
Psychiatric: appropriate    DATA:   Recent Labs     03/31/21  0600 04/01/21  0600   WBC 19.3* 14.3*   HGB 9.1* 9.0*   HCT 27.5* 27.4*   MCV 88.6 89.8   * 453*     Recent Labs     03/31/21  0600 04/01/21  0600    135   K 4.2 4.4    99   CO2 26 28   BUN 46* 43*   CREATININE 1.44* 1.37*   GLUCOSE 76 310*   CALCIUM 9.6 9.7   LABALBU 3.0* 3.2*   LABGLOM 35.3* 37.4*   GFRAA 42.7* 45.3*       MV Settings:     Vent Mode: (S) CPAP  Vt Ordered: 350 mL  Rate Set: 16 bmp  FiO2 : 40 %  PEEP/CPAP: 6  Pressure Support: 5 cmH20  Peak Inspiratory Pressure: 14 cmH2O  Mean Airway Pressure: 9 cmH20  I:E Ratio: 1:1.60    No results for input(s): PHART, FAD8KMV, PO2ART, PCI4VBS, BEART, K5YGKBRG in the last 72 hours.     O2 Device: None (Room air)  O2 Flow Rate (L/min): 3 L/min    Dietary Nutrition Supplements: Diabetic Oral Supplement  DIET CARB CONTROL; Carb Control: 3 carb choices (45 gms)/meal; Dysphagia Soft and Bite-Sized     MEDICATIONS during current hospitalization:    Continuous Infusions:   dextrose      sodium chloride         Scheduled Meds:   carvedilol  50 mg Oral BID    insulin glargine  25 Units Subcutaneous QAM AC    insulin lispro  4 Units Subcutaneous TID WC    predniSONE  30 mg Oral Daily    Followed by   [facility] ON 4/3/2021] predniSONE  20 mg Oral Daily    Followed by   [facility] ON 4/6/2021] predniSONE  10 mg Oral Daily    verapamil  120 mg Oral BID    nystatin  5 mL Oral 4x Daily    aspirin  81 mg Oral Daily    ipratropium-albuterol  1 ampule Inhalation TID    insulin lispro  0-12 Units Subcutaneous TID WC    insulin lispro  0-6 Units Subcutaneous Nightly    sodium chloride flush  10 mL Intravenous 2 times per day    cloNIDine  0.2 mg Oral BID    meropenem  1,000 mg Intravenous Q12H    pantoprazole  40 mg Intravenous Daily    And    sodium chloride (PF)  10 mL Intravenous Daily    heparin (porcine)  5,000 Units Subcutaneous 3 times per day    docusate  100 mg Oral BID    polyethylene glycol  17 g Oral Daily    budesonide  0.5 mg Nebulization BID    sodium chloride flush  10 mL Intravenous 2 times per day       PRN Meds:albuterol, sodium chloride flush, glucose, dextrose, glucagon (rDNA), dextrose, sodium chloride, hydrALAZINE **OR** hydrALAZINE, labetalol, labetalol, promethazine **OR** ondansetron, sodium chloride flush, acetaminophen **OR** acetaminophen    Radiology  Chest x-ray reviewed by me, residual small left-sided effusion, no infiltrate      IMPRESSION AND SUGGESTION:  Patient is at risk due to   · Acute hypoxic spelt failure, improving  · Volume overload, improving  · Stridor improved significantly, secondary to pulmonary edema  · ESBL UTI  · Left-sided pleural effusion, likely third spacing    Recommendations    · Watch volume status, avoid overload  · Continue taper prednisone  · Antibiotics per ID  · Incentive spirometry and flutter valve  · BiPAP while asleep  · Will need sleep study as outpatient  · Follow-up with me after discharge  · Likely home tomorrow         Electronically signed by Raquel Lopez MD,  FCCP   on 4/1/2021 at 11:07 AM

## 2021-04-01 NOTE — PROGRESS NOTES
Patient remains cooperative throughout morning. Discontinued lap restrain. Has AVASYS and on a 1:1 with PCA. Patient denies any pain besides when being rolled. States chest is sore from CPR. Denied stool softeners but had bowel movement over night.  at bedside and informed him on patient plan.

## 2021-04-02 ENCOUNTER — TELEPHONE (OUTPATIENT)
Dept: FAMILY MEDICINE CLINIC | Age: 76
End: 2021-04-02

## 2021-04-02 ENCOUNTER — TELEPHONE (OUTPATIENT)
Dept: PALLATIVE CARE | Age: 76
End: 2021-04-02

## 2021-04-02 VITALS
SYSTOLIC BLOOD PRESSURE: 154 MMHG | HEART RATE: 76 BPM | DIASTOLIC BLOOD PRESSURE: 64 MMHG | HEIGHT: 59 IN | RESPIRATION RATE: 18 BRPM | BODY MASS INDEX: 29.72 KG/M2 | WEIGHT: 147.4 LBS | TEMPERATURE: 98.2 F | OXYGEN SATURATION: 100 %

## 2021-04-02 LAB
GLUCOSE BLD-MCNC: 255 MG/DL (ref 60–115)
GLUCOSE BLD-MCNC: 260 MG/DL (ref 60–115)
PERFORMED ON: ABNORMAL
PERFORMED ON: ABNORMAL

## 2021-04-02 PROCEDURE — 99233 SBSQ HOSP IP/OBS HIGH 50: CPT | Performed by: PSYCHIATRY & NEUROLOGY

## 2021-04-02 PROCEDURE — 6360000002 HC RX W HCPCS: Performed by: INTERNAL MEDICINE

## 2021-04-02 PROCEDURE — 6370000000 HC RX 637 (ALT 250 FOR IP): Performed by: INTERNAL MEDICINE

## 2021-04-02 PROCEDURE — 99232 SBSQ HOSP IP/OBS MODERATE 35: CPT | Performed by: INTERNAL MEDICINE

## 2021-04-02 PROCEDURE — 6370000000 HC RX 637 (ALT 250 FOR IP): Performed by: NURSE PRACTITIONER

## 2021-04-02 PROCEDURE — APPSS30 APP SPLIT SHARED TIME 16-30 MINUTES: Performed by: NURSE PRACTITIONER

## 2021-04-02 PROCEDURE — C9113 INJ PANTOPRAZOLE SODIUM, VIA: HCPCS | Performed by: INTERNAL MEDICINE

## 2021-04-02 PROCEDURE — 2580000003 HC RX 258: Performed by: INTERNAL MEDICINE

## 2021-04-02 PROCEDURE — 97116 GAIT TRAINING THERAPY: CPT

## 2021-04-02 PROCEDURE — 94640 AIRWAY INHALATION TREATMENT: CPT

## 2021-04-02 PROCEDURE — APPSS30 APP SPLIT SHARED TIME 16-30 MINUTES: Performed by: PHYSICIAN ASSISTANT

## 2021-04-02 PROCEDURE — 95816 EEG AWAKE AND DROWSY: CPT

## 2021-04-02 PROCEDURE — 99232 SBSQ HOSP IP/OBS MODERATE 35: CPT | Performed by: NURSE PRACTITIONER

## 2021-04-02 PROCEDURE — 94761 N-INVAS EAR/PLS OXIMETRY MLT: CPT

## 2021-04-02 PROCEDURE — 97535 SELF CARE MNGMENT TRAINING: CPT

## 2021-04-02 RX ORDER — PREDNISONE 20 MG/1
20 TABLET ORAL DAILY
Qty: 3 TABLET | Refills: 0 | Status: SHIPPED | OUTPATIENT
Start: 2021-04-03 | End: 2021-04-06

## 2021-04-02 RX ORDER — LEVETIRACETAM 500 MG/1
500 TABLET ORAL 2 TIMES DAILY
Status: DISCONTINUED | OUTPATIENT
Start: 2021-04-02 | End: 2021-04-02 | Stop reason: HOSPADM

## 2021-04-02 RX ORDER — LEVETIRACETAM 500 MG/1
500 TABLET ORAL 2 TIMES DAILY
Qty: 60 TABLET | Refills: 3 | Status: SHIPPED | OUTPATIENT
Start: 2021-04-02 | End: 2021-09-02 | Stop reason: SDUPTHER

## 2021-04-02 RX ORDER — CARVEDILOL 25 MG/1
50 TABLET ORAL 2 TIMES DAILY
Qty: 120 TABLET | Refills: 3 | Status: SHIPPED | OUTPATIENT
Start: 2021-04-02 | End: 2021-05-25 | Stop reason: SDUPTHER

## 2021-04-02 RX ORDER — ASPIRIN 81 MG/1
81 TABLET ORAL DAILY
Qty: 30 TABLET | Refills: 3 | Status: SHIPPED | OUTPATIENT
Start: 2021-04-03 | End: 2021-05-25 | Stop reason: ALTCHOICE

## 2021-04-02 RX ADMIN — IPRATROPIUM BROMIDE AND ALBUTEROL SULFATE 1 AMPULE: .5; 3 SOLUTION RESPIRATORY (INHALATION) at 12:41

## 2021-04-02 RX ADMIN — CARVEDILOL 50 MG: 25 TABLET, FILM COATED ORAL at 10:17

## 2021-04-02 RX ADMIN — PREDNISONE 30 MG: 10 TABLET ORAL at 10:16

## 2021-04-02 RX ADMIN — SODIUM CHLORIDE, PRESERVATIVE FREE 10 ML: 5 INJECTION INTRAVENOUS at 10:33

## 2021-04-02 RX ADMIN — PANTOPRAZOLE SODIUM 40 MG: 40 INJECTION, POWDER, FOR SOLUTION INTRAVENOUS at 10:15

## 2021-04-02 RX ADMIN — NYSTATIN 500000 UNITS: 100000 SUSPENSION ORAL at 13:42

## 2021-04-02 RX ADMIN — VERAPAMIL HYDROCHLORIDE 120 MG: 80 TABLET, FILM COATED ORAL at 10:17

## 2021-04-02 RX ADMIN — ASPIRIN 81 MG: 81 TABLET, COATED ORAL at 10:16

## 2021-04-02 RX ADMIN — MEROPENEM 1000 MG: 1 INJECTION, POWDER, FOR SOLUTION INTRAVENOUS at 10:16

## 2021-04-02 RX ADMIN — CLONIDINE HYDROCHLORIDE 0.2 MG: 0.1 TABLET ORAL at 10:16

## 2021-04-02 RX ADMIN — HEPARIN SODIUM 5000 UNITS: 5000 INJECTION INTRAVENOUS; SUBCUTANEOUS at 06:46

## 2021-04-02 RX ADMIN — INSULIN GLARGINE 40 UNITS: 100 INJECTION, SOLUTION SUBCUTANEOUS at 06:46

## 2021-04-02 RX ADMIN — HEPARIN SODIUM 5000 UNITS: 5000 INJECTION INTRAVENOUS; SUBCUTANEOUS at 12:17

## 2021-04-02 RX ADMIN — LEVETIRACETAM 500 MG: 500 TABLET ORAL at 12:20

## 2021-04-02 RX ADMIN — NYSTATIN 500000 UNITS: 100000 SUSPENSION ORAL at 10:16

## 2021-04-02 ASSESSMENT — ENCOUNTER SYMPTOMS
WHEEZING: 0
CONSTIPATION: 0
EYES NEGATIVE: 1
ALLERGIC/IMMUNOLOGIC NEGATIVE: 1
CHEST TIGHTNESS: 0
SHORTNESS OF BREATH: 0
NAUSEA: 0
BACK PAIN: 0
COLOR CHANGE: 0
RESPIRATORY NEGATIVE: 1
GASTROINTESTINAL NEGATIVE: 1
DIARRHEA: 0
VOMITING: 0
VOICE CHANGE: 1
TROUBLE SWALLOWING: 1
PHOTOPHOBIA: 0
TROUBLE SWALLOWING: 0

## 2021-04-02 ASSESSMENT — PAIN SCALES - GENERAL
PAINLEVEL_OUTOF10: 0
PAINLEVEL_OUTOF10: 0

## 2021-04-02 NOTE — PROGRESS NOTES
Physical Therapy Med Surg Daily Treatment Note  Facility/Department: [address]  Room: [place] [id number]       NAME: Toño Salinas  : 1945 (40 y.o.)  MRN: [id number]  CODE STATUS: Limited    Date of Service: 2021    Patient Diagnosis(es): Cardiac arrest [facility]) [I46.9]   Chief Complaint   Patient presents with    Respiratory Distress     Patient Active Problem List    Diagnosis Date Noted    Cardiopulmonary arrest [facility])      Priority: High    Lumbosacral radiculopathy at L5 2015     Priority: High     Class: Acute    Spinal stenosis of lumbar region with neurogenic claudication 2015     Priority: High     Class: Chronic    High risk medication use-Norco - 17 OARRS PM&R, 18 OARRS PM&R, 18 OARRS PM&R, Urine Drug screen negative 17 PM&R--MED CONTRACT 2014     Priority: High    Goals of care, counseling/discussion     Bradycardia     Moderate hypoxic-ischemic encephalopathy     Acute respiratory failure with hypoxia (HCC)     Cardiac arrest ([address] 2021    Gait abnormality 10/14/2020    Late effects of CVA (cerebrovascular accident) 10/14/2020    Sepsis ([address] 10/06/2020    Major depressive disorder in remission ([address] 10/06/2020    Gastroesophageal reflux disease without esophagitis 10/06/2020    Acute cystitis without hematuria 10/06/2020    CKD (chronic kidney disease) stage 4, GFR 15-29 ml/min ([address] 10/06/2020    Adenomatous polyp of sigmoid colon     Adenomatous polyp of transverse colon     Encephalopathy acute     Flash pulmonary edema ([address] 2020    Acute on chronic kidney failure ([address] 2020    Dysarthria     Chronic fatigue     Symptomatic anemia 2020    COPD exacerbation ([address] 2020    Anemia     AVM (arteriovenous malformation)     Gastrointestinal hemorrhage 2020    HOCM (hypertrophic obstructive cardiomyopathy) ([address] 2019    Hypoglycemia     SAÚL (acute kidney injury) ([address] 10/23/2019    Acute on chronic diastolic (congestive) heart failure (HCC) 10/13/2019    Chronic obstructive pulmonary disease with acute exacerbation ([facility] 10/12/2019    Hypertensive urgency 10/09/2019    Uncontrolled type 2 diabetes mellitus with hyperglycemia ([facility])     Acute on chronic diastolic heart failure ([address] 10/08/2019    CHF (congestive heart failure) ([facility])     PMB (postmenopausal bleeding)     Acute combined systolic and diastolic CHF, NYHA class 1 ([facility] 03/24/2019    Osteoarthritis of spine with radiculopathy, lumbar region 11/07/2018    Myalgia 05/11/2018    Intercostal neuropathy 05/11/2018    Diabetic asymmetric polyneuropathy ([facility] 04/11/2017    Cervical radicular pain 12/03/2016    Reactive depression 07/15/2016    Diabetic radiculopathy ([facility] 07/15/2016    Insomnia secondary to chronic pain 04/15/2016    Non-compliant patient NO showed FU 1/10/17 Dr Beverley Fink 09/24/2015    Impaired mobility and activities of daily living 03/28/2015    Neck pain 03/04/2015    Artificial lens present 10/03/2014    Presbyopia 10/03/2014    Astigmatism, regular 10/03/2014    Cataract, nuclear sclerotic senile 10/03/2014    IDDM (insulin dependent diabetes mellitus) 10/03/2014    Regular astigmatism 10/03/2014    Nuclear senile cataract 10/03/2014    Memory deficit 06/04/2014    SOB (shortness of breath) on exertion 03/14/2014    Chest pain 03/14/2014    CTS (carpal tunnel syndrome) 02/09/2014    DJD (degenerative joint disease), lumbar 02/08/2014    Lumbosacral radiculopathy at S1 02/08/2014    DDD (degenerative disc disease), lumbar 02/08/2014    Dysphonia 02/08/2014    Insomnia 12/04/2013    Smoker 06/18/2013    Hypertension     Hyperlipidemia     Uncontrolled type 2 diabetes mellitus with complication, without long-term current use of insulin ([facility])     Fibromyalgia     Anxiety         Past Medical History:   Diagnosis Date    Anxiety     Asthma     dx 2019 / has smoked since age 11    CHF (congestive heart failure) (HCC)     Chronic back pain     Bilateral L5 S1 Radic on emg--surprisingly worse on the left than the right--pt's symptoms and her MRI show worse on the right    Chronic obstructive pulmonary disease with acute exacerbation (HCC) 10/12/2019    Depression     Fibromyalgia     Hyperlipidemia     meds > 8 yrs    Hypertension     meds > 45 yrs    On home O2     2l per n/c at bedtime mostly,     Osteoarthritis     Type II diabetes mellitus, uncontrolled (HCC)     hx > 8 yrs    Unspecified sleep apnea      Past Surgical History:   Procedure Laterality Date    BACK SURGERY  2017    lumbar disc    CARDIAC CATHETERIZATION  11/3/14     DR. MIRELES / no stents    COLONOSCOPY  08/29/2016    w/polypectomy     COLONOSCOPY N/A 9/29/2020    COLONOSCOPY WITH POLYPECTOMY performed by Sandip Taveras MD at [facility] N/A 10/7/2019    EUA HYSTEROSCOPY DILATATION AND CURETTAGE performed by Xu Junior DO at [facility]. [address], COLON, DIAGNOSTIC      EYE SURGERY      [place] with IOL OU / [address] [place]  [address]    umbilical hernia repair    MN ESOPHAGOGASTRODUODENOSCOPY TRANSORAL DIAGNOSTIC N/A 3/24/2017    EGD ESOPHAGOGASTRODUODENOSCOPY performed by Daria Spaulding MD at [place] [id number] 2/8/2018    negative findings    MN REVISE MEDIAN N/CARPAL TUNNEL SURG Left 6/5/2017    LEFT  CARPAL TUNNEL RELEASE performed by Larissa Bonds MD at [facility],8+X/U,HLXIG N/A 2/8/2018    TONSILLECTOMY      as child    UPPER GASTROINTESTINAL ENDOSCOPY  08/26/2016    w/bx     UPPER GASTROINTESTINAL ENDOSCOPY N/A 2/25/2020    EGD possible biopsy performed by Sandip Taveras MD at [address] [place] [address] 7/1/2020    EGD PUSH ENTEROSCOPY performed by Rodolfo Fernandez MD at [facility] Restrictions  Restrictions/Precautions: Contact Precautions; Fall Risk    SUBJECTIVE   General  Chart Reviewed: Yes  Family / Caregiver Present: Yes()  Subjective  Subjective: i am doing alright  General Comment  Comments: nsg reports pt is okay for treatment this date    Pre-Session Pain Report     Pain Screening  Patient Currently in Pain: No  Pre Treatment Pain Screening  Pain at present: 0  Scale Used: Numeric Score  Intervention List: Patient able to continue with treatment    Post-Session Pain Report  Pain Assessment  Pain Assessment: 0-10  Pain Level: 0         OBJECTIVE        Bed mobility  Rolling to Right: Minimal assistance  Supine to Sit: Minimal assistance  Sit to Supine: (DNT pt in chair post)  Scooting: Minimal assistance  Comment: increased time and effort to perform, pt able to get BLE off bed, pt able to come to a sitting position with Min A. VC to scoot forward and place feet on the ground. HOB elevated    Transfers  Sit to Stand: Minimal Assistance;2 Person Assistance  Stand to sit: Minimal Assistance;2 Person Assistance  Bed to Chair: Moderate assistance;2 Person Assistance  Comment: VC for hand placement with poor carryover. pt needing mod A for bed to chair transfer to guide hips to chair, +2 for line managment and for safety d/t BLE weakness and increased fatigue with functional mobility. VC to raise gaze while standing. pt fatigues quickly    Ambulation  Ambulation?: Yes  More Ambulation?: No  Ambulation 1  Surface: level tile  Device: Rolling Walker  Assistance: Moderate assistance;2 Person assistance(+2 for safety and line management)  Quality of Gait: FF posture, occasional BLE knee buckling, slow moving, decreased RLE foot clearance, decreased BLE step length, downward gaze  Distance: 6 steps forward and backwards 2x with rest break in between. Comments: fatigues quickly.     Stairs/Curb  Stairs?: No                                    Activity Tolerance  Activity Tolerance: Patient limited by fatigue;Patient limited by endurance          ASSESSMENT     Pt demonstrated ability to perform pre-gait interventions such as taking forward and backward steps away from bed with [address] with mod A+2 assist as pt presents with BLE weakness and fatigue. Discharge Recommendations:  Continue to assess pending progress, Patient would benefit from continued therapy after discharge    Goals  Long term goals  Long term goal 1: Bed mobility with Mod A  Long term goal 2: unsupported sitting with Min A x 8 min  Long term goal 3: functional reach in sitting <4 inch OOBOS  Long term goal 4: progress to functional transfers with 2 person Mod A  Long term goal 5: to be assessed for gait when appropriate  Patient Goals   Patient goals : \"I want to get better\"    PLAN    Times per week: 3-6  Safety Devices  Type of devices: All fall risk precautions in place, Call light within reach, Chair alarm in place, Left in chair(pts . came into pts room to speak with pt and  post tx. pt in chair with alarm. )     [facility] (6 CLICK) BASIC MOBILITY  AM-PAC Inpatient Mobility Raw Score : 12     Therapy Time   Individual   Time In 1020   Time Out 1045   Minutes 25      bm/transfer:10  Gait: [address], PTA, 04/02/21 at 10:56 AM         Definitions for assistance levels  Independent = pt does not require any physical supervision or assistance from another person for activity completion. Device may be needed.   Stand by assistance = pt requires verbal cues or instructions from another person, close to but not touching, to perform the activity  Minimal assistance= pt performs 75% or more of the activity; assistance is required to complete the activity  Moderate assistance= pt performs 50% of the activity; assistance is required to complete the activity  Maximal assistance = pt performs 25% of the activity; assistance is required to complete the activity  Dependent = pt requires total physical assistance to accomplish the task

## 2021-04-02 NOTE — PROGRESS NOTES
Progress Note  Patient: Wilman Wallace  Unit/Bed: G535/C761-21  YOB: 1945  MRN: [id number]  Acct: [de-identified]   Admitting Diagnosis: Cardiac arrest [facility]) [I46.9]  Date:  3/18/2021  Hospital Day: 15    Chief Complaint:  CPA    Subjective    4/2/21: Patient resting comfortably in bed in no acute distress. Denies chest pain or shortness of breath. Has been refusing telemetry per nursing note and patient remove it 6 times overnight. Still refusing telemetry this morning. Also refusing lab work and any further evaluation stating that she is going home this morning at 9 AM.  No vitals since yesterday evening. 4/1/21: Resting quietly in bed in no acute distress. Denies chest pain or shortness of breath. Hemodynamically stable. On telemetry is sinus rhythm with heart rate in the 90s. Patient's carvedilol was titrated to 50 mg p.o. twice daily yesterday by Dr. Damian in addition to verapamil 120 mg p.o. twice daily for improved blood pressure and heart rate management. SAÚL continues to improve with creatinine of 1.44, BUN of 46 and GFR of 42.7. KEVIN globin low but stable at 9.1. WBC elevated at 19.3 yesterday. She is on IV antibiotics and ID is following. Echocardiogram completed on 3/19/2021 showed EF of 50%, severe concentric LVH, 1-2+ MR and 1+ TR. Has history of mild to moderate nonobstructive CAD per cardiac catheterization 10/7/2020. Plan for eventual discharge to SNF.    3/31/21: no events overnight. No cp no sob attempting to get out of bed all night. Lap belt for protection.    EKG: ST 88    3/30/21: no events overnihgt. Has left sided CP when she is reaching for objects likely related to injury from CPR,  Voice less hoarse.  in room.    EKG: ST 88    3/29/21: doing well post extubation on Friday. Hoarse. Thirsty. Not eating much. Transferred out to  yesterday. EKG: ST 77    3/25/21: remains vented 35% FIO2. No Acute events. Minimally responsive.   in room> HTN overnight   EKG: ST 82    3/24/21: remains vented 35% FIO2. No Acute events. Minimally responsive   EKG: ST 82    3/23/21:  in room. Off sedatives and will try CPAP for weaning.    EKG: ST 82    3/22/21: Daughter, Whitney Salazar, in room. Pt is off Sedatives. Pt is alert and follows simple commands. EKG:     3/21/21: On vent. Sedated just turned off.  present at the bedside. Hemodynamically stable, off of pressors now. Normal sinus rhythm on telemetry heart rate of 97bpm    3/20/21: On vent. Sedated.  present at the bedside. Hemodynamically stable on Levophed low-dose. Normal sinus rhythm on telemetry heart rate of 88 bpm.  Blood pressure 94/52    3/19/21: Pt's  called my office yesterday PM.  Pt Took an extra dose to Torsemide on her own to try to urinate more. She became weak and dizzy. BP was 79/56 per . Pt was awake and orineted at the time per . I advised Juices/gatorade and have her sit for a while.     She presented to ER few hours later and she coded and intubated.     I calledhusband this am to understand what transpired. He gave the juice as instructed. She felt soome better. She went to bed to lie down. She took a nap for a little while. He then gave more water. He then  heard her gasping for air and became unresponsive. He called 911. She was not breathing for a while until squad came. She had prolonged CPR        Review of Systems:   Review of Systems   Constitutional: Negative for activity change and fever. HENT: Negative for congestion. Respiratory: Negative for chest tightness and shortness of breath. Cardiovascular: Negative for chest pain and leg swelling. Gastrointestinal: Negative for nausea and vomiting. Musculoskeletal: Negative for arthralgias. Skin: Negative for color change and pallor. Neurological: Negative for dizziness and syncope. Psychiatric/Behavioral: Negative for agitation.          Physical Examination:    BP (!) 135/50   Pulse 89   Temp 98.6 °F (37 °C) (Oral)   Resp 18   Ht 4' 11\" (1.499 m)   Wt 147 lb 6.4 oz (66.9 kg)   LMP  (LMP Unknown)   SpO2 100%   BMI 29.77 kg/m²    Physical Exam  Constitutional:       General: She is not in acute distress. Appearance: Normal appearance. She is obese. HENT:      Head: Normocephalic and atraumatic. Neck:      Musculoskeletal: Normal range of motion and neck supple. Cardiovascular:      Rate and Rhythm: Normal rate and regular rhythm. Heart sounds: Murmur present. Pulmonary:      Effort: Pulmonary effort is normal. No respiratory distress. Breath sounds: No wheezing, rhonchi or rales. Abdominal:      Palpations: Abdomen is soft. Tenderness: There is no abdominal tenderness. Musculoskeletal:      Right lower leg: No edema. Left lower leg: No edema. Skin:     General: Skin is warm and dry. Neurological:      General: No focal deficit present. Mental Status: She is alert.    Psychiatric:         Mood and Affect: Mood normal.         Behavior: Behavior normal.         LABS:  CBC:   Lab Results   Component Value Date    WBC 14.3 04/01/2021    RBC 3.05 04/01/2021    HGB 9.0 04/01/2021    HCT 27.4 04/01/2021    MCV 89.8 04/01/2021    MCH 29.5 04/01/2021    MCHC 32.8 04/01/2021    RDW 16.4 04/01/2021     04/01/2021    MPV 8.8 08/01/2015     CBC with Differential:   Lab Results   Component Value Date    WBC 14.3 04/01/2021    RBC 3.05 04/01/2021    HGB 9.0 04/01/2021    HCT 27.4 04/01/2021     04/01/2021    MCV 89.8 04/01/2021    MCH 29.5 04/01/2021    MCHC 32.8 04/01/2021    RDW 16.4 04/01/2021    NRBC 2 03/31/2021    BANDSPCT 1 03/31/2021    LYMPHOPCT 12.8 04/01/2021    MONOPCT 9.6 04/01/2021    BASOPCT 0.8 04/01/2021    MONOSABS 1.4 04/01/2021    LYMPHSABS 1.8 04/01/2021    EOSABS 0.2 04/01/2021    BASOSABS 0.1 04/01/2021     CMP:    Lab Results   Component Value Date     04/01/2021    K 4.4 04/01/2021    K 4.4 10/19/2020    CL 99 04/01/2021    CO2 28 04/01/2021    BUN 43 04/01/2021    CREATININE 1.37 04/01/2021    GFRAA 45.3 04/01/2021    LABGLOM 37.4 04/01/2021    GLUCOSE 310 04/01/2021    PROT 5.9 03/29/2021    LABALBU 3.2 04/01/2021    CALCIUM 9.7 04/01/2021    BILITOT <0.2 03/29/2021    ALKPHOS 99 03/29/2021    AST 21 03/29/2021    ALT 28 03/29/2021     BMP:    Lab Results   Component Value Date     04/01/2021    K 4.4 04/01/2021    K 4.4 10/19/2020    CL 99 04/01/2021    CO2 28 04/01/2021    BUN 43 04/01/2021    LABALBU 3.2 04/01/2021    CREATININE 1.37 04/01/2021    CALCIUM 9.7 04/01/2021    GFRAA 45.3 04/01/2021    LABGLOM 37.4 04/01/2021    GLUCOSE 310 04/01/2021     Magnesium:    Lab Results   Component Value Date    MG 1.7 04/01/2021     Troponin:    Lab Results   Component Value Date    TROPONINI 0.056 03/19/2021       Radiology:  No results found. [facility] 10/7/20:  Conclusions  Procedure Summary  TVP placement  50-60% mid LAD stenosis, component of spasm improved with nitro. LVEDP=24mmhg. Recommendations  Aggressive risk factor management. Maximize medical therapy. consider PCI of mid LAD in future if symptomatic or abn stress test.   Angiographic Findings   Cardiac Arteries and Lesion Findings  LMCA: small caliber, normal.  LAD: small caliber, mid 50-60%. diffuse disease  LCx: small caliber, mild diffuse disease  RCA: small caliber, mild disease. Dominance: Left   VA  Ventriculography Findings  LVEDP is 24 mmHg. Echocardiogram 3/19/21:  Conclusions   Summary   Mitral annular calcification is present. 1-2+ MR   Normal tricuspid valve structure and function. 1+ TR   RVSP 31 mmHg   severe CLVH   LVEF 50%   E/A flow reversal noted. Suggestive of diastolic dysfunction.    Signature   ----------------------------------------------------------------   Electronically signed by Martina Chin MD(Interpreting   physician) on 03/19/2021 02:13 PM ----------------------------------------------------------------   Findings  Left Ventricle  severe CLVH  LVEF 50%  E/A flow reversal noted. Suggestive of diastolic dysfunction. Right Ventricle  Normal right ventricle structure and function. Normal right ventricle systolic pressure. Left Atrium  Moderately dilated left atrium. Right Atrium  Normal right atrium. Mitral Valve  Mitral annular calcification is present. 1-2+ MR  Tricuspid Valve  Normal tricuspid valve structure and function. 1+ TR  RVSP 31 mmHg  Aortic Valve  Normal aortic valve structure and function. Pulmonic Valve  The pulmonic valve was not well visualized . Pericardial Effusion  No evidence of pericardial effusion. Pleural Effusion  No evidence of pleural effusion. Aorta \ Miscellaneous  The aorta is within normal limits. EKG 3/18/21: Junctional rhythm 45, T wave inversional lead aVL, V1-V2, QTc 420ms    Telemetry 4/1/21: SR 90s  4/2/21: Pt refusing tele    Assessment:    Active Hospital Problems    Diagnosis Date Noted    Goals of care, counseling/discussion [Z71.89]     Bradycardia [R00.1]     Moderate hypoxic-ischemic encephalopathy [P91.62]     Acute respiratory failure with hypoxia ([address] [J96.01]     Cardiac arrest ([facility] [address] [I46.9] 03/18/2021    Encephalopathy acute [G93.40]      1. S/p CPA  2. Acute resp failure s/p extubation  3. Aspiration PNA  4. Hx bradycardia/junctional frantz--SR now  5. Hx nonobstructive CAD per [facility] 10/7/20  6. Hx HCOM with severe CLVH noted on echo 3/19/21  7. Normal LVF EF 50% per echo 3/19/21  8. SAÚL--improving  9. Anemia  10. Uncontrolled HTN--improving  11. Dyslipidemia  12. DM  13. Hx of moderate right LYNDON per  6/30/20  14. UTI         Plan:  1.  Maximize medical therapyaspirin 81 mg p.o. daily, Coreg 50 mg p.o. twice daily, clonidine 0.2 mg p.o. twice daily, verapamil 120 mg p.o. twice daily, no ACE inhibitor/angiotensin receptor blocker at this time secondary to SAÚL, insulin as directed, heparin 5000 units subcu every 8 hours, IV antibiotics with Merrem 1000 mg IV every 12 hours  2. Cardiac/diabetic/less than 2 g sodium diet recommended  3. Monitor on telemetry for any tachycardia or bradycardia arrhythmias  4. Maintain potassium greater than 4, magnesium greater than 2  5. GI/DVT prophylaxis  6. No plan for any invasive ischemic evaluation at this time. Patient is status post cardiac catheterization on 10/7/2020 following cardiopulmonary arrest which revealed only mild to moderate nonobstructive CAD. 7. Consider event monitor in future as outpatient for further evaluation of bradycardia arrhythmias which were noted earlier in this admission as well as prior admissions. 8. Pulmonology recommendations  9. Infectious disease recommendations regarding UTI--on IV Merrem  10. Endocrinology recommendations regarding uncontrolled type 2 diabetes  11. Neurology recommendations regarding hypoxic encephalopathy  12. EP evaluation was ordered yesterday however patient states that she does not want to have this evaluation completed because she wants to go home this morning  13.  Follow-up with Dr. Catracho Navarro in 2 weeks upon discharge      Electronically signed by DIANE Paerl on 4/2/2021 at 7:16 AM

## 2021-04-02 NOTE — TELEPHONE ENCOUNTER
----- Message from JOHNATHON Girard CNP sent at 3/31/2021  1:35 PM EDT -----  Regarding: appt  Please schedule Pall Care appointment for patient. She is currently hospitalized. Call her  to set up appointment.

## 2021-04-02 NOTE — DISCHARGE INSTR - DIET

## 2021-04-02 NOTE — FLOWSHEET NOTE
0740-Patient upset and wanting to go home. Tried to explain to the patient the doctor needs to see her first. Patient alert and oriented x 3 and speaking clearly. Patient ate about 25% of breakfast. Encouraged patient to eat more, patient refused. Patient keeps taking telemetry monitor off. Tele monitor put back on. Avasys at bedside. Bed alarm on. 1547- Patient trying to get up. Patient noted to be having the tremors again. Patient will respond just delayed answers and speech not as clear.  arrived and states patient does do this at home sometimes and they were suppose to follow up with a neurologist but never did. Doctors will be notified again. Patient noted to be incontinent of stool and cleaned up. Electronically signed by Tonya Olivares on 4/2/2021 at 9:27 AM    1100- Call out to Dr. Rosa Real to verify if patient needs a PICC line for IV antibiotics. [facility] who is on the floor and updated her on patient's episodes. Electronically signed by Tonya Oilvares on 4/2/2021 at 11:47 AM    1230- Sanchez catheter removed per Dr. Allan Fernandez. Patient notified to let staff know when she needs to urinate. Electronically signed by Tonya Olivares on 4/2/2021 at 12:31 PM    [phone] Spoke with Dr. Rosa Real and patient does not need IV antibiotics or a follow up. Electronically signed by Tonya Olivares on 4/2/2021 at 1:30 PM

## 2021-04-02 NOTE — FLOWSHEET NOTE
Pt has taken off tele leads no less than 6 times this shift. She is now refusing to allow them back on and states \"I don't wear them at home\".

## 2021-04-02 NOTE — PROGRESS NOTES
Infectious Disease     Patient Name: Doris Paul  Date: 4/2/2021  YOB: 1945  Medical Record Number: [id number]        ESBL E. coli UTI         Escherichia coli (esbl) (1)    Antibiotic Interpretation KAYLEE Status    amoxicillin-clavulanate Resistant 4 mcg/mL     ampicillin Resistant >=32 mcg/mL     ceFAZolin Resistant >=64 mcg/mL     cefepime Resistant 2 mcg/mL     cefTRIAXone Resistant >=64 mcg/mL     ciprofloxacin Sensitive <=0.25 mcg/mL     gentamicin Sensitive <=1 mcg/mL     imipenem Sensitive <=0.25 mcg/mL     nitrofurantoin Sensitive <=16 mcg/mL     piperacillin-tazobactam Resistant <=4 mcg/mL     trimethoprim-sulfamethoxazole Sensitive <=20 mcg/mL         Chest x-rays show improvement diminishing infiltrate in left lung but still persistent areas that appears to be consistent with effusion      Review of Systems   Respiratory: Negative. Cardiovascular: Negative. Gastrointestinal: Negative. Multidrug-resistant organism is the problem so there is not an oral antibiotic       Physical Exam   Cardiovascular: Normal heart sounds. No murmur heard. Pulmonary/Chest: Effort normal and breath sounds normal. No respiratory distress. She has no wheezes. She has no rales. She exhibits no tenderness. Abdominal: Soft. She exhibits no distension and no mass. There is no abdominal tenderness. There is no rebound and no guarding. Genitourinary:    Genitourinary Comments: Sanchez catheter in clear urine         Blood pressure (!) 154/64, pulse 76, temperature 98.2 °F (36.8 °C), temperature source Oral, resp. rate 18, height 4' 11\" (1.499 m), weight 147 lb 6.4 oz (66.9 kg), SpO2 100 %, not currently breastfeeding.       .   Lab Results   Component Value Date    WBC 14.3 (H) 04/01/2021    HGB 9.0 (L) 04/01/2021    HCT 27.4 (L) 04/01/2021    MCV 89.8 04/01/2021     (H) 04/01/2021     Lab Results   Component Value Date     04/01/2021    K 4.4 04/01/2021    K 4.4 10/19/2020    CL 99

## 2021-04-02 NOTE — ADT AUTH CERT
Utilization Reviews       Ventricular Arrhythmias - Care Day 15 (4/1/2021) by Christian Main RN       Review Status Review Entered   Completed 4/1/2021 12:48      Criteria Review      Care Day: 15 Care Date: 4/1/2021 Level of Care: Telemetry    Guideline Day 3    Level Of Care    ( ) Intermediate care or telemetry to discharge    4/1/2021 12:48 PM EDT by Neil Frederick      tele unit - no discharged plans for this day    ( ) Complete discharge planning    4/1/2021 12:48 PM EDT by Neil Frederick      in progress per CM    Clinical Status    (X) * Hemodynamic stability    4/1/2021 12:48 PM EDT by Neil Frederick      144/58, 93, 20, 98.1, 100% on RA    (X) * Heart rhythm and rate acceptable    4/1/2021 12:48 PM EDT by Neil Frederick      NSR    (X) * No evidence of myocardial ischemia    4/1/2021 12:48 PM EDT by Armida Mckinley not documented    (X) * Mental status at baseline [F]    4/1/2021 12:48 PM EDT by Neil Frederick      A & O X 4    ( ) * No need for continued inpatient ECG monitoring    4/1/2021 12:48 PM EDT by Armida Mckinley maintained on tele    (X) * ICD absent or settings acceptable for next level of care    4/1/2021 12:48 PM EDT by Neil Frederick      absent    ( ) * No precipitating conditions requiring continued inpatient care identified    ( ) * Discharge plans and education understood    Activity    (X) * Ambulatory or acceptable for next level of care    4/1/2021 12:48 PM EDT by Neil Frederick      on program w/ PT/OT    Routes    (X) * Oral hydration, medications, and diet    4/1/2021 12:48 PM EDT by Neil Frederick      po meds and dysphagia carb control diet ordered    Interventions    (X) Cardiac monitoring    (X) Probable electrolytes, ECG    4/1/2021 12:48 PM EDT by Neil Frederick      lytes wnl, no ECG    Medications    (X) Possible medication for underlying heart disease (eg, beta-blockers, diuretics, ACE inhibitors, aspirin)    4/1/2021 12:48 PM EDT by Neil Frederick      aspirin 81 mg po daily, Coreg 50 mg po BID, Nitro-bid 2% ointment - 1 inch 4 times daily, Calan 120 mg po BID    * Milestone   Additional Notes   04/01/2021 Care day 15      Physical Exam:   Constitutional:        General: She is not in acute distress.      Appearance: Normal appearance. She is obese. HENT:       Head: Normocephalic and atraumatic. Neck:       Musculoskeletal: Normal range of motion and neck supple. Cardiovascular:       Rate and Rhythm: Normal rate and regular rhythm.       Heart sounds: Murmur present. Pulmonary:       Effort: Pulmonary effort is normal. No respiratory distress.       Breath sounds: No wheezing, rhonchi or rales. Abdominal:       Palpations: Abdomen is soft.       Tenderness: There is no abdominal tenderness. Musculoskeletal:       Right lower leg: No edema.       Left lower leg: No edema. Skin:      General: Skin is warm and dry. Neurological:       General: No focal deficit present.       Mental Status: She is alert.     Psychiatric:          Mood and Affect: Mood normal.          Behavior: Behavior normal.           Radiology testing/Labs pertinent:   No imaging   LABS:    B/Cr 43/1.37   Anion Gap 8   GFR 45.3   Glucose 310   WBC 14.3   RBC 3.05   HGB 9.0   HCT 27.4   MCHC 32.8   RDW 16.4      Neutrophils absolute 10.8   Monocytes Absolute 1.4         Meds-name, dose, route,rate:   Scheduled Meds:   carvedilol, 50 mg, Oral, BID   insulin glargine, 25 Units, Subcutaneous, QAM AC   insulin lispro, 4 Units, Subcutaneous, TID WC   predniSONE, 30 mg, Oral, Daily     Followed by   Rosendo Cruz ON 4/3/2021] predniSONE, 20 mg, Oral, Daily     Followed by   Rosendo Cruz ON 4/6/2021] predniSONE, 10 mg, Oral, Daily   verapamil, 120 mg, Oral, BID   nystatin, 5 mL, Oral, 4x Daily   aspirin, 81 mg, Oral, Daily   ipratropium-albuterol, 1 ampule, Inhalation, TID   insulin lispro, 0-12 Units, Subcutaneous, TID WC   insulin lispro, 0-6 Units, Subcutaneous, Nightly   cloNIDine, 0.2 mg, Oral, BID   meropenem, 1,000 mg, Intravenous, Q12H   pantoprazole, 40 mg, Intravenous, Daily   heparin (porcine), 5,000 Units, Subcutaneous, 3 times per day   docusate, 100 mg, Oral, BID   polyethylene glycol, 17 g, Oral, Daily   budesonide, 0.5 mg, Nebulization, BID   Continuous Infusions: HEP-LOCKED   PRN Meds: none given          Treatment plan-attending, consults   Cardiology Assessment:     4/1/21: Resting quietly in bed in no acute distress.  Denies chest pain or shortness of breath.  Hemodynamically stable.  On telemetry is sinus rhythm with heart rate in the 90s.  Patient's carvedilol was titrated to 50 mg p.o. twice daily yesterday by Dr. Tatum Johnson in addition to verapamil 120 mg p.o. twice daily for improved blood pressure and heart rate management. Lorena Garg continues to improve with creatinine of 1.44, BUN of 46 and GFR of 42.7.  KEVIN globin low but stable at 9.1.  WBC elevated at 19.3 yesterday.  She is on IV antibiotics and ID is following.  Echocardiogram completed on 3/19/2021 showed EF of 50%, severe concentric LVH, 1-2+ MR and 1+ TR.  Has history of mild to moderate nonobstructive CAD per cardiac catheterization 10[facility]2020[facility]. Active Hospital Problems     Diagnosis Date Noted   · Goals of care, counseling/discussion [Z71.89]     · Bradycardia [R00.1]     · Moderate hypoxic-ischemic encephalopathy [P91.62]     · Acute respiratory failure with hypoxia (HCC) [J96.01]     · Cardiac arrest ([facility] [address] [I46.9] 03/18/2021   · Encephalopathy acute [G93.40]     1. S/p CPA   2. Acute resp failure s/p extubation   3. Aspiration PNA   4. Hx bradycardia/junctional frantz--SR now   5. Hx nonobstructive CAD per [facility] 10/7/20   6. Hx HCOM with severe CLVH noted on echo 3/19/21   7. Normal LVF EF 50% per echo 3/19/21   8. SAÚL--improving   9. Anemia   10. Uncontrolled HTN--improving   11. Dyslipidemia   12. DM   13. Hx of moderate right LYNDON per US 6/30/20   14. UTI   Plan:   1.  Maximize medical therapy-aspirin 81 mg p.o. daily, Coreg 50 mg p.o. twice daily, clonidine 0.2 mg p.o. twice daily, verapamil 120 mg p.o. twice daily, no ACE inhibitor/angiotensin receptor blocker at this time secondary to SAÚL, insulin as directed, heparin 5000 units subcu every 8 hours, IV antibiotics with Merrem 1000 mg IV every 12 hours   2. Cardiac/diabetic/less than 2 g sodium diet recommended   3. Monitor on telemetry for any tachycardia or bradycardia arrhythmias   4. Maintain potassium greater than 4, magnesium greater than 2   5. GI/DVT prophylaxis   6. No plan for any invasive ischemic evaluation at this time.  Patient is status post cardiac catheterization on 10/7/2020 following cardiopulmonary arrest which revealed only mild to moderate nonobstructive CAD. 7. Consider event monitor in future as outpatient for further evaluation of bradycardia arrhythmias which were noted earlier in this admission as well as prior admissions. 8. Pulmonology recommendations   9. Infectious disease recommendations regarding UTI--on IV Merrem   10. Endocrinology recommendations regarding uncontrolled type 2 diabetes   11. Neurology recommendations regarding hypoxic encephalopathy   12. PT/OT evaluation and treatment.  Plan for SNF at discharge   13. Will follow         PULMONOLOGY IMPRESSION AND SUGGESTION:   INTERVAL HPI: Patient seen and examined at bedside, Interval Notes, orders reviewed.  Nursing notes noted   Feels better, no shortness of breath, no chest pain, still has hoarseness, no fever, currently on room air saturation 90%, no nausea no vomiting   Patient is at risk due to    · Acute hypoxic spelt failure, improving   · Volume overload, improving   · Stridor improved significantly, secondary to pulmonary edema   · ESBL UTI   · Left-sided pleural effusion, likely third spacing   Recommendations   · Watch volume status, avoid overload   · Continue taper prednisone   · Antibiotics per ID   · Incentive spirometry and flutter valve   · BiPAP while asleep   · Will

## 2021-04-02 NOTE — CARE COORDINATION
The LSW met with the patient and her . The patient's  verbally signed the IMM and expressed an understanding of the medicare appeal process. The patient's  states he would like a bedside commode. The patient's  states the hospital bed was delivered to the home yesterday. The patient's  was notified of the cost for the current IV. The patient's  states he does not want a SNF for the patient and he will learn how to do the IV at home. The LSW updated the , the hospitalist, and the patient's RN. Electronically signed by MILAN Herndon on 4/2/21 at 11:38 AM EDT    The LSW faxed the bedside commode prescription to [facility]. Electronically signed by MILAN Herndon on 4/2/21 at 11:41 AM EDT    Per Joe Cuadra, they are unable to provide the bedside commode. The LSW met with the patient's . The patient's  is aware he can obtain the DME from [facility] or wherever he choses. The patient's  states he would like the script at this time. The LSW updated the patient's RN.   Electronically signed by MILAN Herndon on 4/2/21 at 2:07 PM EDT

## 2021-04-02 NOTE — PROGRESS NOTES
Neurology Follow up    SUBJECTIVE: Patient seen and examined for neurology follow-up for Hypoxic encephalopathy secondary to cardiopulmonary arrest with subsequent acute hypoxic respiratory failure requiring intubation and mechanical ventilation. Patient extubated on 3/26/21. Currently alert and oriented x3, no acute distress, cooperative. Patient with hypophonia secondary to hoarseness but this is overall improving. Denies headache or vision changes. No dysphagia or dysarthria. Patient remains nonfocal and without seizure activity. Patient has had 2 episodes of shaking of the left arm followed by head shaking and rolling back of the eyes with some dysarthria. Episodes lasted for several minutes. Patient back to baseline after episodes completed.  is in the room. He reports patient has had these episodes since October of last year after she suffered cardiac arrest.  He reports she has approximately 1 episode a week. They were supposed to see neurology but never had a chance to. There is no loss of bowel or bladder. No tongue biting. She does not appear to be postictal.   reports that patient also was diagnosed with pseudoseizures approximately 20 years ago while they were living in [place]. Patient does have significant anxiety.  reports that she has panic attacks occasionally. Events noted patient had some spells that were noted and we were consulted again for the same which we have reviewed. This appears to be nonepileptic.       Current Facility-Administered Medications   Medication Dose Route Frequency Provider Last Rate Last Admin    insulin lispro (HUMALOG) injection vial 10 Units  10 Units Subcutaneous TID  Lyle Scott MD   10 Units at 04/02/21 1017    insulin glargine (LANTUS) injection vial 40 Units  40 Units Subcutaneous QAM AC Lyle Scott MD   40 Units at 04/02/21 0646    carvedilol (COREG) tablet 50 mg  50 mg Oral BID Nava Martinez MD   50 mg at 04/02/21 [address] 4/3/2021] predniSONE (DELTASONE) tablet 20 mg  20 mg Oral Daily Helena Mccoy MD        Followed by   Maria E Silverio ON 4/6/2021] predniSONE (DELTASONE) tablet 10 mg  10 mg Oral Daily Helena Mccoy MD        verapamil (CALAN) tablet 120 mg  120 mg Oral BID Star Myles MD   120 mg at 04/02/21 1017    nystatin (MYCOSTATIN) 798724 UNIT/ML suspension 500,000 Units  5 mL Oral 4x Daily Chelsey Martinez MD   500,000 Units at 04/02/21 1016    aspirin EC tablet 81 mg  81 mg Oral Daily Star Myles MD   81 mg at 04/02/21 1016    ipratropium-albuterol (DUONEB) nebulizer solution 1 ampule  1 ampule Inhalation TID Maki Clarke DO   1 ampule at 04/01/21 1927    albuterol (PROVENTIL) nebulizer solution 2.5 mg  2.5 mg Nebulization Q2H PRN Maki Clarke DO   2.5 mg at 03/31/21 7190    insulin lispro (HUMALOG) injection vial 0-12 Units  0-12 Units Subcutaneous TID WC Keaton Melton MD   Stopped at 04/02/21 1015    insulin lispro (HUMALOG) injection vial 0-6 Units  0-6 Units Subcutaneous Nightly Keaton Melton MD   6 Units at 04/01/21 2201    sodium chloride flush 0.9 % injection 10 mL  10 mL Intravenous 2 times per day Helena Mccoy MD   10 mL at 04/02/21 1033    sodium chloride flush 0.9 % injection 10 mL  10 mL Intravenous PRN Helena Mccoy MD        cloNIDine (CATAPRES) tablet 0.2 mg  0.2 mg Oral BID Helena Mccoy MD   0.2 mg at 04/02/21 1016    glucose (GLUTOSE) 40 % oral gel 15 g  15 g Oral PRN Helena Mccoy MD        dextrose 50 % IV solution  12.5 g Intravenous PRN Helena Mccoy MD        glucagon (rDNA) injection 1 mg  1 mg Intramuscular PRN Helena Mccoy MD        dextrose 5 % solution  100 mL/hr Intravenous PRN Helena Mccoy MD        meropenem (MERREM) 1,000 mg in sodium chloride 0.9 % 100 mL IVPB (mini-bag)  1,000 mg Intravenous Q12H Irma Mcmahon  mL/hr at 04/02/21 1016 1,000 mg at 04/02/21 1016    0.9 % sodium chloride infusion   Intravenous PRN Margi Bui Bescak, DO        pantoprazole (PROTONIX) injection 40 mg  40 mg Intravenous Daily Ashutosh Freire MD   40 mg at 21 1015    And    sodium chloride (PF) 0.9 % injection 10 mL  10 mL Intravenous Daily Ashutosh Freire MD   10 mL at 21 1033    heparin (porcine) injection 5,000 Units  5,000 Units Subcutaneous 3 times per day Ashutosh Freire MD   5,000 Units at 21 0646    docusate (COLACE) 50 MG/5ML liquid 100 mg  100 mg Oral BID Ashutosh Freire MD   100 mg at 21 2018    polyethylene glycol (GLYCOLAX) packet 17 g  17 g Oral Daily Ashutosh Freire MD   17 g at 21 0941    budesonide (PULMICORT) nebulizer suspension 500 mcg  0.5 mg Nebulization BID Ashutosh Freire MD   500 mcg at 21 1927    hydrALAZINE (APRESOLINE) injection 10 mg  10 mg Intravenous Q4H PRN Colon Ganong Sedar, DO   10 mg at 21 2321    Or    hydrALAZINE (APRESOLINE) injection 20 mg  20 mg Intravenous Q4H PRN Colon Ganong Sedar, DO   20 mg at 21 205    labetalol (NORMODYNE;TRANDATE) injection 20 mg  20 mg Intravenous Q4H PRN Thor , DO   20 mg at 21 1227    labetalol (NORMODYNE;TRANDATE) injection 40 mg  40 mg Intravenous Q4H PRN Thor , DO   40 mg at 21 1302    promethazine (PHENERGAN) tablet 12.5 mg  12.5 mg Oral Q6H PRN Becka Mcginnis MD        Or    ondansetron (ZOFRAN) injection 4 mg  4 mg Intravenous Q6H PRN Becka Mcginnis MD        sodium chloride flush 0.9 % injection 10 mL  10 mL Intravenous 2 times per day Becka Mcginnis MD   10 mL at 21 1015    sodium chloride flush 0.9 % injection 10 mL  10 mL Intravenous PRN Becka Mcginnis MD        acetaminophen (TYLENOL) tablet 650 mg  650 mg Oral Q6H PRN Becka Mcginnis MD   650 mg at 213    Or    acetaminophen (TYLENOL) suppository 650 mg  650 mg Rectal Q6H PRN Becka Mcginnis MD   650 mg at 21 0153       PHYSICAL EXAM:    BP (!) 154/64   Pulse 76   Temp 98.2 °F (36.8 °C) (Oral)   Resp 18   Ht 4' 11\" (1.499 m)   Wt 147 lb 6.4 oz (66.9 kg)   LMP  (LMP Unknown)   SpO2 100%   BMI 29.77 kg/m²   General Appearance:      Skin:  normal  CVS - Normal sounds, No murmurs , No carotid Bruits  RS -CTA  Abdomen Soft, BS present  Review of Systems   Unable to perform ROS: Mental status change   Constitutional: Negative for chills, fever and unexpected weight change. HENT: Positive for voice change. Negative for congestion and trouble swallowing. Eyes: Negative for photophobia and visual disturbance. Respiratory: Negative for chest tightness, shortness of breath and wheezing. Cardiovascular: Negative for chest pain and leg swelling. Gastrointestinal: Negative for diarrhea, nausea and vomiting. Endocrine: Negative. Genitourinary: Negative. Musculoskeletal: Negative for back pain, gait problem and neck stiffness. Skin: Negative for rash. Allergic/Immunologic: Negative. Neurological: Positive for seizures. Negative for dizziness, tremors, syncope, facial asymmetry, speech difficulty, weakness, light-headedness, numbness and headaches. Hematological: Negative. Psychiatric/Behavioral: Negative for confusion, hallucinations and sleep disturbance. Spells notable which are well described  Mental Status Exam:             She is much more alert, awake, follows commands and does not appear to be in any acute distress. Funduscopic Exam: Deferred    Cranial Nerves: Face does appear again to be symmetric V1 V2 and V3 are intact good shoulder shrug present bilaterally. Cranial nerve III           Pupils:  equal, round, reactive to light      Cranial nerves III, IV, VI           Extraocular Movements: intact        Motor: Patient is able to lift all 4 extremities antigravity she is able to perform lifting both arms and legs off bed for greater than 5 seconds.           Sensory: Unable to perform        Pinprick             Right Upper Extremity:  normal             Left Upper Extremity: normal             Right Lower Extremity:  normal             Left Lower Extremity:  normal           Vibration                         Touch            Proprioception                 Coordination:           Finger/Nose   Right:  normal              Left:  normal          Heel-Knee-Shin                Right:  normal              Left:  normal          Rapid Alternating Movements              Right:  normal              Left:  normal          Gait:                       Casual:  normal                         Romberg:  normal            Reflexes:             Deep Tendon Reflexes:             Reflexes are 2 +             Plantar response:                Right:  downgoing               Left:  downgoing    Vascular:  Cardiac Exam:  normal        Neal is as above    Echocardiogram Complete 2d With Doppler With Color    Result Date: 3/19/2021  Transthoracic Echocardiography Report (TTE)  Demographics  Patient Name   Sarah Carpenter  Gender              Female                 M  Patient Number [id number]        Race                Black                                 Ethnicity  Visit Number   [id number]       Room Number         IC05  Corporate ID                   Date of Study       03/19/2021  Accession      [id number]      Referring Physician  Number  Date of Birth  1945      Sonographer         Roberto Arriola LPN  Age            67 year(s)      Interpreting        [facility]) Cardiology                                 Physician           Stephanie Kuo MD Procedure Type of Study  TTE procedure:ECHO COMPLETE 2D W/DOP W/COLOR. Procedure Date Date: 03/19/2021 Start: 09:15 AM Study Location: Portable Technical Quality: Good visualization Indications:Cardiac arrest. Patient Status: Routine Weight: 143 pounds BP: 89/48 mmHg Allergies   - Other allergy:(Darvon). Conclusions  Summary  Mitral annular calcification is present.   1-2+ MR  Normal tricuspid valve structure and function. 1+ TR  RVSP 31 mmHg  severe CLV[facility]  LVEF 50%  E/A flow reversal noted. Suggestive of diastolic dysfunction. Signature  ----------------------------------------------------------------  Electronically signed by Yoli Jorgensen MD(Interpreting  physician) on 03/19/2021 02:13 PM  ----------------------------------------------------------------  Findings Left Ventricle severe [facility] LVEF 50% E/A flow reversal noted. Suggestive of diastolic dysfunction. Right Ventricle Normal right ventricle structure and function. Normal right ventricle systolic pressure. Left Atrium Moderately dilated left atrium. Right Atrium Normal right atrium. Mitral Valve Mitral annular calcification is present. 1-2+ MR Tricuspid Valve Normal tricuspid valve structure and function. 1+ TR RVSP 31 mmHg Aortic Valve Normal aortic valve structure and function. Pulmonic Valve The pulmonic valve was not well visualized . Pericardial Effusion No evidence of pericardial effusion. Pleural Effusion No evidence of pleural effusion. Aorta \ Miscellaneous The aorta is within normal limits. M-Mode Measurements (cm)  LVIDd: 4.01 cm                         LVIDs: 1.85 cm  IVSd: 1.45 cm                          IVSs: 1.23 cm  LVPWd: 1.32 cm                         LVPWs: 1.61 cm  Rt. Vent.  Dimension: 1.07 cm           AO Root Dimension: 2.8 cm                                         ACS: 1.47 cm                                         LA: 3.04 cm                                         LVOT: 1.98 cm Doppler Measurements:  AV Velocity:0.01 m/s                   MV Peak E-Wave: 0.96 m/s  AV Peak Gradient: 7.73 mmHg            MV Peak A-Wave: 1.58 m/s  AV Mean Gradient: 3.01 mmHg  AV Area (Continuity):1.27 cm^2         MV P1/2t: 105.6 msec  TR Velocity:2.29 m/s                   MVA by PHT2.08 cm^2  TR Gradient:21.01 mmHg                 Estimated RAP:10 mmHg                                         RVSP:31 mmHg Valves  Mitral Valve  Peak E-Wave: 0.96 m/s          Peak A-Wave: 1.58 m/s  P1/2t: 105.6 msec              E/A Ratio: 0.61                                 Peak Gradient: 3.7 mmHg  Area (PHT): 2.08 cm^2          Deceleration Time: 349.6 msec  MR Velocity: 4.66 m/s  Aortic Valve  Peak Velocity: 1.39 m/s                Mean Velocity: 0.79 m/s  Peak Gradient: 7.73 mmHg               Mean Gradient: 3.01 mmHg  Area (continuity): 1.27 cm^2  AV VTI: 28.28 cm  Cusp Separation: 1.47 cm  Tricuspid Valve  Estimated RVSP: 31 mmHg              Estimated RAP: 10 mmHg  TR Velocity: 2.29 m/s                TR Gradient: 21.01 mmHg  Pulmonic Valve  Peak Velocity: 0.68 m/s           Peak Gradient: 1.86 mmHg                                    Estimated PASP: 31.01 mmHg  LVOT  Peak Velocity: 0.72 m/s              Mean Velocity: 0.41 m/s  Peak Gradient: 2 mmHg                Mean Gradient: 0.9 mmHg  LVOT Diameter: 1.98 cm               LVOT VTI: 11.64 cm Structures  Left Atrium  LA Dimension: 3.04 cm                 LA Area: 18.08 cm^2  LA/Aorta: 1.09  LA Volume/Index: 49.97 ml  Left Ventricle  Diastolic Dimension: 0.59 cm         Systolic Dimension: 2.96 cm  Septum Diastolic: 4.19 cm            Septum Systolic: 5.29 cm  PW Diastolic: 6.28 cm                PW Systolic: 3.57 cm                                       FS: 53.9 %  LV EDV/LV EDV Index: 70.29 ml        LV ESV/LV ESV Index: 10.47 ml  EF Calculated: 85.1 %  LVOT Diameter: 1.98 cm  Right Ventricle  Diastolic Dimension: 9.32 cm                                    RV Systolic Pressure: 23.60 mmHg Aorta/ Miscellaneous Aorta  Aortic Root: 2.8 cm  LVOT Diameter: 1.98 cm    Xr Chest Portable    Result Date: 3/19/2021  EXAMINATION: XR CHEST PORTABLE CLINICAL HISTORY: INTUBATION COMPARISONS: OCTOBER 9, 2020 FINDINGS: Endotracheal tube has migrated 3 cm inferiorly, just lying cephalad to mojgan. Nasogastric tube courses beneath diaphragm. Osseous structures intact. Cardiopericardial silhouette normal. Aorta calcified. Ill-defined area increased opacity right  lung base. RIGHT LOWER LUNG SUBSEGMENTAL ATELECTASIS/PNEUMONIA. INTERVAL INFERIOR MIGRATION ENDOTRACHEAL TUBE 3 CM WITH TIP NOW JUST PROXIMAL TO MOJGAN. MAY WISH TO RETRACT ENDOTRACHEAL TUBE 2 CM. Xr Chest Portable    Result Date: 3/19/2021  EXAMINATION: XR CHEST PORTABLE CLINICAL HISTORY: RESPIRATORY FAILURE COMPARISONS: MARCH 16, 2021 FINDINGS: Endotracheal tube tip 2.3 cm superior to mojgan. Nasogastric tube courses beneath diaphragm area osseous structures intact. Ill-defined areas increase opacity at lung bases. Cardiopericardial silhouette normal.     BIBASILAR SUBSEGMENTAL ATELECTASIS/PNEUMONIA IN THIS PARTIALLY EXPIRATORY CHEST RADIOGRAPH. Recent Labs     03/31/21  0600 04/01/21  0600   WBC 19.3* 14.3*   HGB 9.1* 9.0*   * 453*     Recent Labs     03/31/21  0600 04/01/21  0600    135   K 4.2 4.4    99   CO2 26 28   BUN 46* 43*   CREATININE 1.44* 1.37*   GLUCOSE 76 310*     No results for input(s): BILITOT, ALKPHOS, AST, ALT in the last 72 hours. Lab Results   Component Value Date    PROTIME 14.4 03/29/2021    INR 1.1 03/29/2021     No results found for: LITHIUM, DILFRTOT, VALPROATE    ASSESSMENT AND PLAN  Encephalopathy secondary to cardiorespiratory arrest.  Patient may have suffered some hypoxemic damage though this very difficult to certain as patient is now extubated and off sedation   and she does appear to be improving each day. .  We will keep an eye on this repeat CT scan did not anything significant as far as mass lesions or structural lesions no early CT sign changes seen to suggest any acute ischemia and no evidence of any gray-white matter changes to suggest severe hypoxic ischemic changes. The patient does appear to be tolerating diet at this point which we will advance as tolerated. Patient may benefit from rehabilitation stay with cognitive and speech therapy.     Hypoxic encephalopathy secondary to cardiopulmonary arrest with subsequent acute hypoxic respiratory failure requiring intubation and mechanical ventilation. Patient extubated on 3/26/21  Acute on chronic anemia with hemoglobin of 5.8 on arrival status post transfusion  Patient remains hypertensive with history of hypertensive cardiomyopathy. Patient with known moderate right renal artery stenosis attributing to resistant hypertension  DNR CCA, palliative care on consult  Encephalopathy has resolved. Nonfocal.  No seizure activity reported. Continue to follow    Encephalopathy resolved  Patient also being treated for ESBL UTI  Nonfocal, continues to have generalized weakness    Discharge planning in progress for home with nursing home health care and palliative care to follow  Okay to DC from neurology standpoint when medically stable. Will follow at a distance    Complex partial seizures versus pseudoseizure  Obtain EEG  Start Keppra 500mg BID  Patient has been declined MRI at this time  Okay to DC from neurology standpoint after EEG completed    I have personally performed a face to face diagnostic evaluation on this patient, reviewed all data and investigations, and am the sole provider of all clinical decisions on the neurological status of this patient. Exam notable for spells that are described may appear to be complex partial seizures or pseudoseizures we will restart her Keppra just in case. Mehul Sykes MD, Chauncey Mendoza, American Board of Psychiatry & Neurology  Board Certified in Vascular Neurology  Board Certified in Neuromuscular Medicine  Certified in . [employer]

## 2021-04-02 NOTE — DISCHARGE SUMMARY
Hospital Medicine Discharge Summary    Frida Gaytan  :  1945  MRN:  [id number]    Admit date:  3/18/2021  Discharge date:  2021    Admitting Physician:  Silvana Navarrete MD  Primary Care Physician:  Asia Wilkerson MD      Discharge Diagnoses: Active Problems:    Encephalopathy acute    Cardiac arrest (HCC)    Acute respiratory failure with hypoxia (HCC)    Goals of care, counseling/discussion    Bradycardia    Moderate hypoxic-ischemic encephalopathy    Infection due to ESBL-producing Escherichia coli  Resolved Problems:    * No resolved hospital problems.  *      Hospital Course:   Frida Gaytan is a 67 y.o. female that was admitted and treated at [facility] for the following medical issues:     Cardiac arrest s/p ROSC   - required Dopamine, norepinephrine and epinephrine infusion  - TTE showed LVEF of 50%, severe LVH  - transferred of of ICU  - remains hypertensive, meds adjusted by cardiology      Acute hypoxic respiratory failure  - requiring intubation and mechanical ventilation  - extubated on 3/22, but had to be re-intubated same day due to stridor and respiratory distress  - extubated on 3/26 to BIPAP, no re-intubation per family  - on RA  - switched from IV Solumedrol to prednisone taper  - will need sleep study as outpatient per pulmonology    Acute encephalopathy  - likely hypoxic-ischemic etiology from cardiac arrest  - CT head was negative per report  - improved  - had a brief episode of altered mental status with eye-rolling, tongue protrusion and shaking of the head and arms per nursing staff,   - repeat CT head was negative  - has been experiencing similar episodes at home since her first cardiac arrest in 10/2021  - started on Keppra by neurology     Acute / chronic anemia   - with Hb of 5.8 on arrival, FOBT was positive  - improved s/p PRBCs given in ED  - H/H remained stable     SAÚL/CKD3  - improved  - no ACEI/ARB on d/c per cardiology     IDDM2 with hyperglycemia  - not well controlled with Glc in the 300-400s, probably worsened by steroid therapy  - on Lantus, premeal coverage, ISS per endocrinology     ESBL E.Coli UTI / aspiration pneumonia  - completed IV meropenem per ID     Dysphonia   - likely due to recent intubation and acute illness  - outpatient f/u per ENT    Code status [facility],  met with hospice, not ready to make a decision for now      Disposition - home         Patient was seen by the following consultants while admitted to [facility]:   Consults:  Love Mclean.    Significant Diagnostic Studies:    Echocardiogram Complete 2d With Doppler With Color    Result Date: 3/19/2021  Transthoracic Echocardiography Report (TTE)  Demographics  Patient Name   Saturnino Hanson  Gender              Female                 M  Patient Number [id number]        Race                Black                                 Ethnicity  Visit Number   [id number]       Room Number         IC05  Corporate ID                   Date of Study       03/19/2021  Accession      [id number]      Referring Physician  Number  Date of Birth  1945      Leila Quiroz LPN  Age            67 year(s)      Interpreting        [facility]) Cardiology                                 Physician           Princess Nichole MD Procedure Type of Study  TTE procedure:ECHO COMPLETE 2D W/DOP W/COLOR.  Procedure Date Date: 03/19/2021 Start: 09:15 AM Study Location: Portable Technical Quality: Good visualization Indications:Cardiac arrest. Patient Status: Routine Weight: 143 pounds BP: 89/48 mmHg Allergies   - Other allergy:(Darvon). Conclusions  Summary  Mitral annular calcification is present. 1-2+ MR  Normal tricuspid valve structure and function. 1+ TR  RVSP 31 mmHg  severe CLVH  LVEF 50%  E/A flow reversal noted. Suggestive of diastolic dysfunction. Signature  ----------------------------------------------------------------  Electronically signed by Conrado Plascencia MD(Interpreting  physician) on 03/19/2021 02:13 PM  ----------------------------------------------------------------  Findings Left Ventricle severe CLVH LVEF 50% E/A flow reversal noted. Suggestive of diastolic dysfunction. Right Ventricle Normal right ventricle structure and function. Normal right ventricle systolic pressure. Left Atrium Moderately dilated left atrium. Right Atrium Normal right atrium. Mitral Valve Mitral annular calcification is present. 1-2+ MR Tricuspid Valve Normal tricuspid valve structure and function. 1+ TR RVSP 31 mmHg Aortic Valve Normal aortic valve structure and function. Pulmonic Valve The pulmonic valve was not well visualized . Pericardial Effusion No evidence of pericardial effusion. Pleural Effusion No evidence of pleural effusion. Aorta \ Miscellaneous The aorta is within normal limits. M-Mode Measurements (cm)  LVIDd: 4.01 cm                         LVIDs: 1.85 cm  IVSd: 1.45 cm                          IVSs: 1.23 cm  LVPWd: 1.32 cm                         LVPWs: 1.61 cm  Rt. Vent.  Dimension: 1.07 cm           AO Root Dimension: 2.8 cm                                         ACS: 1.47 cm                                         LA: 3.04 cm                                         LVOT: 1.98 cm Doppler Measurements:  AV Velocity:0.01 m/s                   MV Peak E-Wave: 0.96 m/s  AV Peak Gradient: 7.73 mmHg            MV Peak A-Wave: 1.58 m/s  AV Mean Gradient: 3.01 mmHg  AV Area (Continuity):1.27 cm^2         MV P1/2t: 105.6 msec  TR Velocity:2.29 m/s                   MVA by PHT2.08 cm^2  TR Gradient:21.01 mmHg                 Estimated RAP:10 mmHg                                         RVSP:31 mmHg Valves  Mitral Valve  Peak E-Wave: 0.96 m/s          Peak A-Wave: 1.58 m/s  P1/2t: 105.6 msec              E/A Ratio: 0.61                                 Peak Gradient: 3.7 mmHg  Area (PHT): 2.08 cm^2          Deceleration Time: 349.6 msec  MR Velocity: 4.66 m/s  Aortic Valve  Peak Velocity: 1.39 m/s                Mean Velocity: 0.79 m/s  Peak Gradient: 7.73 mmHg               Mean Gradient: 3.01 mmHg  Area (continuity): 1.27 cm^2  AV VTI: 28.28 cm  Cusp Separation: 1.47 cm  Tricuspid Valve  Estimated RVSP: 31 mmHg              Estimated RAP: 10 mmHg  TR Velocity: 2.29 m/s                TR Gradient: 21.01 mmHg  Pulmonic Valve  Peak Velocity: 0.68 m/s           Peak Gradient: 1.86 mmHg                                    Estimated PASP: 31.01 mmHg  LVOT  Peak Velocity: 0.72 m/s              Mean Velocity: 0.41 m/s  Peak Gradient: 2 mmHg                Mean Gradient: 0.9 mmHg  LVOT Diameter: 1.98 cm               LVOT VTI: 11.64 cm Structures  Left Atrium  LA Dimension: 3.04 cm                 LA Area: 18.08 cm^2  LA/Aorta: 1.09  LA Volume/Index: 49.97 ml  Left Ventricle  Diastolic Dimension: 2.29 cm         Systolic Dimension: 3.08 cm  Septum Diastolic: 9.92 cm            Septum Systolic: 3.67 cm  PW Diastolic: 9.41 cm                PW Systolic: 3.69 cm                                       FS: 53.9 %  LV EDV/LV EDV Index: 70.29 ml        LV ESV/LV ESV Index: 10.47 ml  EF Calculated: 85.1 %  LVOT Diameter: 1.98 cm  Right Ventricle  Diastolic Dimension: 0.88 cm                                    RV Systolic Pressure: 42.07 mmHg Aorta/ Miscellaneous Aorta  Aortic Root: 2.8 cm  LVOT Diameter: 1.98 cm    Xr Chest Portable    Result Date: 3/19/2021  EXAMINATION: XR CHEST PORTABLE CLINICAL HISTORY: INTUBATION COMPARISONS: OCTOBER 9, 2020 FINDINGS: Endotracheal tube has migrated 3 cm inferiorly, just lying cephalad to mojgan. Nasogastric tube courses beneath diaphragm. Osseous structures intact. Cardiopericardial silhouette normal. Aorta calcified. Ill-defined area increased opacity right  lung base. RIGHT LOWER LUNG SUBSEGMENTAL ATELECTASIS/PNEUMONIA. INTERVAL INFERIOR MIGRATION ENDOTRACHEAL TUBE 3 CM WITH TIP NOW JUST PROXIMAL TO MOJGAN. MAY WISH TO RETRACT ENDOTRACHEAL TUBE 2 CM. Xr Chest Portable    Result Date: 3/19/2021  EXAMINATION: XR CHEST PORTABLE CLINICAL HISTORY: RESPIRATORY FAILURE COMPARISONS: MARCH 16, 2021 FINDINGS: Endotracheal tube tip 2.3 cm superior to mojgan. Nasogastric tube courses beneath diaphragm area osseous structures intact. Ill-defined areas increase opacity at lung bases. Cardiopericardial silhouette normal.     BIBASILAR SUBSEGMENTAL ATELECTASIS/PNEUMONIA IN THIS PARTIALLY EXPIRATORY CHEST RADIOGRAPH.       Discharge Medications:       Amadeo Simpson   Home Medication Instructions NPC:[id number]    Printed on:04/02/21 1505   Medication Information                      ACCU-CHEK PHYLLIS PLUS strip  Test 3x daily Dx E11.65             Accu-Chek Softclix Lancets MISC  bid             aspirin 81 MG EC tablet  Take 1 tablet by mouth daily             Blood Glucose Monitoring Suppl (ACCU-CHEK PHYLLIS CONNECT) w/Device KIT  1 kit by Does not apply route daily             blood glucose test strips (ACCU-CHEK PHYLLIS PLUS) strip  Patient test 3x daily E65.11             carvedilol (COREG) 25 MG tablet  Take 2 tablets by mouth 2 times daily             cloNIDine (CATAPRES) 0.2 MG tablet  Take 1 tablet by mouth 2 times daily             Continuous Blood Gluc  (FREESTYLE MURALI 14 DAY READER) [place]  As directed             Continuous Blood Gluc Sensor (FREESTYLE MURALI 14 DAY SENSOR) [facility]  Use every 2 weeks             dicyclomine (BENTYL) 10 MG capsule  Take 1 capsule by mouth 4 times daily as needed (abdominal pain)             insulin glargine (LANTUS) 100 UNIT/ML injection vial  60 units at bedtime             insulin lispro (HUMALOG) 100 UNIT/ML injection vial  18 units at each mels             ipratropium-albuterol (DUONEB) 0.5-2.5 (3) MG/3ML SOLN nebulizer solution  Inhale 3 mLs into the lungs 3 times daily             levETIRAcetam (KEPPRA) 500 MG tablet  Take 1 tablet by mouth 2 times daily             ondansetron (ZOFRAN) 4 MG tablet  Take 1 tablet by mouth every 8 hours as needed for Nausea or Vomiting             pantoprazole (PROTONIX) 40 MG tablet  Take 40mg twice daily (1st dose 30min before breakfast) for 30 days. After 30 days, you make take 40mg once daily before breakfast.             PARoxetine (PAXIL) 40 MG tablet  TAKE 1 TABLET BY MOUTH ONCE DAILY IN THE MORNING             predniSONE (DELTASONE) 20 MG tablet  Take 1 tablet by mouth daily for 3 days             rosuvastatin (CRESTOR) 40 MG tablet  TAKE 1 TABLET BY MOUTH ONCE DAILY IN THE EVENING             verapamil (CALAN SR) 120 MG extended release tablet  Take 1 tablet by mouth daily                 Disposition:   Discharged to Home. Any [facility] needs that were indicated and/or required as been addressed and set up by Social Work. Condition at discharge: Pt was medically stable at the time of discharge. Significant improvement in clinical condition compared to initial condition at presentation to hospital    Activity: activity as tolerated, fall precautions. Total time taken for discharging this patient: 40 minutes. Greater than 70% of time was spent focused exclusively on this patient. Time was taken to review chart, discuss plans with consultants, reconciling medications, discussing plan answering questions with patient. Yanet Buck  4/2/2021, 3:05 PM  ----------------------------------------------------------------------------------------------------------------------    Sam Boateng,     Please return to ER or call [phone] if you develop any significant signs or symptoms.      I may not have addressed all of your medical illnesses or the abnormal blood work or imaging therefore please ask your PCP Zhang Sarmiento MD and other out patient specialists and providers  to obtain [phone] [facility] record entirely to follow up on all of the abnormal labs, imaging and findings that I have and have not addressed during your hospitalization. Discharging you from the hospital does not mean that your medical care ends here and now. You may still need additional work up, investigation, monitoring, and treatment to be handled from this point on by outside providers including your PCP, Zhang Sarmiento MD , Specialists and other healthcare providers. Please review your list of discharge medications prior to resuming medications you might still have at home, as the medications you need to be taking, dosages or how often you must take them may have changed. For medication questions, contact your retail pharmacy and your PCP, Zhang Sarmiento MD .     ** I STRONGLY RECOMMEND that you follow up with Zhang Sarmiento MD within 3 to 5 days for a post hospitalization evaluation. This specific office visit is covered by your insurance, and is not the same as your annual doctor visit/ check up. This office visit is important, as it may prevent need for repeat and/or future hospitalizations. **    Your medical team at [facility] ([facility]) appreciates the opportunity to work with you to get well!     Sincerely,  Wiliam Ramirez

## 2021-04-02 NOTE — TELEPHONE ENCOUNTER
Pt to be d/c today 4/2/2021  Asking Dr Maury Kocher to follow for nursing, PT, OT, socail worker and home aid orders as needed for patient.      Last vv 3/16/2021

## 2021-04-02 NOTE — PROGRESS NOTES
Palliative Care Progress Note  Patient: Frida Gaytan  Gender: female  YOB: 1945  Unit/Bed: R957/S717-57  CodeStatus: Limited  Inpatient Treatment Team: Treatment Team: Attending Provider: Silvana Navarrete MD; Consulting Physician: Michi Atkins MD; Consulting Physician: Mita Anderson MD; Utilization Reviewer: Carri Power, RN; Consulting Physician: Homa Hernandez MD; Utilization Reviewer: Tab Sandoval RN; Consulting Physician: Calixto Ni MD; Consulting Physician: Lyric Hernandez MD; Patient Care Tech: Bryant Schilling; : Heidy Pereyra RN; : MILAN Cueva; Registered Nurse: Nicholas Perkins. Jerry Murguia RN  Admit Date:  3/18/2021    Chief Complaint: fatigue    History of Presenting Illness:      Frida Gaytan is a 67 y.o. female on hospital day 13 with a history of  Cardiac arrest. PMH is significant for nonobstructive CAD, diastolic HF, HOCM, HTN, IDDM2, CKD3, anemia, prolonged hospitalization in 94/2740 complicated by cardiac arrest s/p ROSC,requiring transvenous pacing due to persistent bradycardia and hypotension despite pressor support, SAÚL, and septic shock. Patient seen and examined at bedside for goals of care discussion, code status discussion, family support, and symptom management. She presented to the ER via EMS after becoming unresponsive at home after complaining of feeling weak and tired and taking an extra torsemide. Upon arrival to the ER patient was bradycardic and hypotensive with agonal respirations. She was intubated in the ER. She was treated with dopamine and Levophed for her persistent bradycardia. She went into cardiac arrest. CPR was initiated and lasted for 16 minutes per report. Patient treated with one unit of PRBC for anemia and positive FOBT. S/P extubation on 3/26/21. She is now on the medical floor. She is scheduled to have MBS done today. Patient and her  met with hospice yesterday to gather information.  No decision has been made at this time for patient to be admitted to hospice care. Patient observed laying in bed. She is alert and oriented x3. Her speech is barely audible secondary to hoarseness. She appears to have oral candidiasis. Will start her on nystatin swish and swallow. Reports that she is just \"very tired\". Denies pain or discomfort. No GI or  complaints voiced. Tells me that she lives at home with her . Gives me permission to discuss her care with her . 3/30/21- Patient observed laying in bed. She is alert and oriented x3. Her speech volume is improving. She is still reporting hoarseness. Reports that she is just \"very tired\". Denies pain or discomfort. No GI or  complaints voiced. She is on contact precaution for UTI with ESBL. Patient's  is at bedside. We discussed Pall care versus hospice care. Stated that he would like for her to return home with home care and palliative care service. 3/31/21- Patient observed laying in bed. She is alert and oriented x3. Her speech volume is improving. She is still reporting hoarseness. Reports that she is just \"very tired\". Denies pain or discomfort. No GI or  complaints voiced. She is on contact precaution for UTI with ESBL. Patient's  is at bedside. Discussed with him his decision to cancel [place] [id number] service. He stated that he misunderstood the question from case management. He is in agreement for [place] [phone] through [facility].  informed that both the patient and her  are in agreement for [place] [id number] and [id number] [place] [address] service at home. 4/1/21- Patient observed laying in bed. She is alert and oriented x3. Patient's  is at bedside Her speech volume is improving. She is still reporting hoarseness. Reports that she is just \"very tired\". Stated that she is in better mood today because she is going home tomorrow. She has a a one to one sitter for safety. Denies pain or discomfort. No GI or  complaints voiced.  She is on contact precaution for UTI with ESBL. She has a guaman to CD. She is on IV Meropenem. ID is following. She is incontinent of bowel. She is currently receiivng PT/OT which she occasionally refusing. Her appetite is poor per nursing staff. She is on a dysphagia soft diet. 4/2/21- Patient observed laying in bed. She is alert and oriented x3. Patient's  is at bedside Her speech volume is improving. She is still reporting hoarseness. Reports that she is just \"very tired\". Stated that she is in better mood today because she is going home today. Denies pain or discomfort. No GI or  complaints voiced. She is on contact precaution for UTI with ESBL. She has a guaman to CD. She is on IV Meropenem. ID is following. She is incontinent of bowel. She is currently receiivng PT/OT which she occasionally refusing. Her appetite is poor per nursing staff. She is on a dysphagia soft diet. She is scheduled for discharge to home today. Palliative care will schedule to follow as outpatient. Review of Systems:       Review of Systems   Constitutional: Positive for activity change, appetite change and fatigue. Negative for unexpected weight change. HENT: Positive for trouble swallowing and voice change. Eyes: Negative. Respiratory: Negative for shortness of breath and wheezing. Cardiovascular: Negative for leg swelling. Gastrointestinal: Negative for constipation, diarrhea and nausea. Endocrine: Negative. Genitourinary: Negative. Musculoskeletal: Positive for gait problem. Skin: Negative for pallor. Allergic/Immunologic: Negative. Neurological: Positive for weakness. Negative for dizziness. Psychiatric/Behavioral: Negative for confusion.        Physical Examination:       BP (!) 154/64   Pulse 76   Temp 98.2 °F (36.8 °C) (Oral)   Resp 18   Ht 4' 11\" (1.499 m)   Wt 147 lb 6.4 oz (66.9 kg)   LMP  (LMP Unknown)   SpO2 100%   BMI 29.77 kg/m²    Physical Exam  Constitutional:       General: She is not in acute aspirin EC tablet 81 mg  81 mg Oral Daily June Spence MD   81 mg at 04/02/21 1016    ipratropium-albuterol (DUONEB) nebulizer solution 1 ampule  1 ampule Inhalation TID Tyrone Sreekanth, DO   1 ampule at 04/02/21 1241    albuterol (PROVENTIL) nebulizer solution 2.5 mg  2.5 mg Nebulization Q2H PRN Tyrone [place], DO   2.5 mg at 03/31/21 2847    insulin lispro (HUMALOG) injection vial 0-12 Units  0-12 Units Subcutaneous TID WC Tato Bee MD   6 Units at 04/02/21 1217    insulin lispro (HUMALOG) injection vial 0-6 Units  0-6 Units Subcutaneous Nightly Tato Bee MD   6 Units at 04/01/21 2201    sodium chloride flush 0.9 % injection 10 mL  10 mL Intravenous 2 times per day Daja Mcclendon MD   10 mL at 04/02/21 1033    sodium chloride flush 0.9 % injection 10 mL  10 mL Intravenous PRN Daja Mcclendon MD        cloNIDine (CATAPRES) tablet 0.2 mg  0.2 mg Oral BID Daja Mcclendon MD   0.2 mg at 04/02/21 1016    glucose (GLUTOSE) 40 % oral gel 15 g  15 g Oral PRN Daja Mcclendon MD        dextrose 50 % IV solution  12.5 g Intravenous PRN Daja Mcclendon MD        glucagon (rDNA) injection 1 mg  1 mg Intramuscular PRN Daja Mcclednon MD        dextrose 5 % solution  100 mL/hr Intravenous PRN Daja Mcclendon MD        meropenem (MERREM) 1,000 mg in sodium chloride 0.9 % 100 mL IVPB (mini-bag)  1,000 mg Intravenous Q12H Fouzia Mishra MD   Stopped at 04/02/21 1045    0.9 % sodium chloride infusion   Intravenous PRN Claudia Bae DO        pantoprazole (PROTONIX) injection 40 mg  40 mg Intravenous Daily Daja Mcclendon MD   40 mg at 04/02/21 1015    And    sodium chloride (PF) 0.9 % injection 10 mL  10 mL Intravenous Daily Daja Mcclendon MD   10 mL at 04/02/21 1033    heparin (porcine) injection 5,000 Units  5,000 Units Subcutaneous 3 times per day Daja Mcclendon MD   5,000 Units at 04/02/21 1217    docusate (COLACE) 50 MG/5ML liquid 100 mg  100 mg Oral BID Daja Mcclendon MD   100 mg at 04/01/21 2018    polyethylene glycol (GLYCOLAX) packet 17 g  17 g Oral Daily Amparo Daigle MD   17 g at 03/31/21 0941    budesonide (PULMICORT) nebulizer suspension 500 mcg  0.5 mg Nebulization BID Amparo Daigle MD   500 mcg at 04/01/21 1927    hydrALAZINE (APRESOLINE) injection 10 mg  10 mg Intravenous Q4H PRN Lucetta Gloss Sedar, DO   10 mg at 03/26/21 2321    Or    hydrALAZINE (APRESOLINE) injection 20 mg  20 mg Intravenous Q4H PRN Lucetta Gloss Sedar, DO   20 mg at 03/25/21 2051    labetalol (NORMODYNE;TRANDATE) injection 20 mg  20 mg Intravenous Q4H PRN Barrera Sabrina, DO   20 mg at 03/24/21 1227    labetalol (NORMODYNE;TRANDATE) injection 40 mg  40 mg Intravenous Q4H PRN Barrera Sabrina, DO   40 mg at 03/27/21 1302    promethazine (PHENERGAN) tablet 12.5 mg  12.5 mg Oral Q6H PRN Michael Hirsch MD        Or    ondansetron (ZOFRAN) injection 4 mg  4 mg Intravenous Q6H PRN Michael Hirsch MD        sodium chloride flush 0.9 % injection 10 mL  10 mL Intravenous 2 times per day Michael Hirsch MD   10 mL at 04/01/21 1015    sodium chloride flush 0.9 % injection 10 mL  10 mL Intravenous PRN Michael Hirsch MD        acetaminophen (TYLENOL) tablet 650 mg  650 mg Oral Q6H PRN Michael Hirsch MD   650 mg at 03/25/21 2153    Or    acetaminophen (TYLENOL) suppository 650 mg  650 mg Rectal Q6H PRN Michael Hirsch MD   650 mg at 03/25/21 0153       History:       PMHx:  Past Medical History:   Diagnosis Date    Anxiety     Asthma     dx 2019 / has smoked since age 14    CHF (congestive heart failure) (HCC)     Chronic back pain     Bilateral L5 S1 Radic on emg--surprisingly worse on the left than the right--pt's symptoms and her MRI show worse on the right    Chronic obstructive pulmonary disease with acute exacerbation ([facility] [address] 10/12/2019    Depression     Fibromyalgia     Hyperlipidemia     meds > 8 yrs    Hypertension     meds > 45 yrs    On home O2     2l per n/c at bedtime mostly,     Osteoarthritis     Type II diabetes mellitus, uncontrolled ([facility] [address]     hx > 8 yrs    Unspecified sleep apnea        PSHx:  Past Surgical History:   Procedure Laterality Date    BACK SURGERY  2017    lumbar disc    CARDIAC CATHETERIZATION  11/3/14     DR. MIRELES / no stents    COLONOSCOPY  08/29/2016    w/polypectomy     COLONOSCOPY N/A 9/29/2020    COLONOSCOPY WITH POLYPECTOMY performed by [facility]anie Munoz MD at [facility] N/A 10/7/2019    EUA HYSTEROSCOPY DILATATION AND CURETTAGE performed by Melodie Black DO at [facility]. [address], COLON, DIAGNOSTIC      EYE SURGERY      [place] with IOL OU / [id number]    umbilical hernia repair    AL ESOPHAGOGASTRODUODENOSCOPY TRANSORAL DIAGNOSTIC N/A 3/24/2017    EGD ESOPHAGOGASTRODUODENOSCOPY performed by Angelique Cowart MD at [place] [id number] 2/8/2018    negative findings    AL REVISE MEDIAN N/CARPAL TUNNEL SURG Left 6/5/2017    LEFT  CARPAL TUNNEL RELEASE performed [facility]rich [facility] at [facility],4+V/X,CWKJD N/A 2/8/2018    TONSILLECTOMY      as child    UPPER GASTROINTESTINAL ENDOSCOPY  08/26/2016    w/bx     UPPER GASTROINTESTINAL ENDOSCOPY N/A 2/25/2020    EGD possible biopsy performed by Bethanie Munoz MD at [facility] N/A 7/1/2020    EGD PUSH ENTEROSCOPY performed by Juliana Diaz MD at [address] Hx:  Social History     Socioeconomic History    Marital status:      Spouse name: Quirino Larson Number of children: 2    Years of education: 12    Highest education level: High school graduate   Occupational History    Occupation: Retired-   Social Needs    Financial resource strain: Not hard at all   [place] insecurity     Worry: Never true     Inability: Never true    Transportation needs     Medical: No     Non-medical: No   Tobacco Use    Smoking status: Former Smoker     Packs/day: 1.00     Years: 59.00     Pack years: 59.00     Types: Cigarettes     Start date: 2017    Smokeless tobacco: Never Used    Tobacco comment: quit 2-3 weeks ago    Substance and Sexual Activity    Alcohol use: Not Currently    Drug use: No    Sexual activity: Yes     Partners: Male   Lifestyle    Physical activity     Days per week: 0 days     Minutes per session: 0 min    Stress:  Only a little   Relationships    Social connections     Talks on phone: More than three times a week     Gets together: More than three times a week     Attends Synagogue service: 1 to 4 times per year     Active member of club or organization: No     Attends meetings of clubs or organizations: Never     Relationship status: Living with partner    Intimate partner violence     Fear of current or ex partner: No     Emotionally abused: No     Physically abused: No     Forced sexual activity: No   Other Topics Concern    None   Social History Narrative    Grew up in [place]     Lives With:  [id number] Spouse    Type of Home: House    Home Layout: Two level, Performs ADL's on one level    Home Access: Stairs to enter with rails    Entrance Stairs - Number of Steps: 4    Bathroom Shower/Tub: Tub/Shower unit, Doors    Bathroom Equipment: Shower chair, Grab bars in [facility]: Rolling walker, Cane, Oxygen(uses her O2 very seldom)    ADL Assistance: Needs assistance    Homemaking Assistance: Needs assistance    Homemaking Responsibilities: No(spouse performs)    Ambulation Assistance: Independent    Transfer Assistance: Independent    Active : No    Occupation: Retired    Type of occupation: worked in [employer]: patient enjoys [employer]       Family Hx:  Family History   Problem Relation Age of Onset    Heart Disease Father         cardiac bypass    Arthritis Father     Arthritis Mother     Other Mother          at age 80    Other Sister [facility]    No Known Problems Daughter     Stroke Son        LABS: Reviewed     CBC:  Lab Results   Component Value Date    WBC 14.3 04/01/2021    RBC 3.05 04/01/2021    HGB 9.0 04/01/2021    HCT 27.4 04/01/2021    MCV 89.8 04/01/2021    MCH 29.5 04/01/2021    MCHC 32.8 04/01/2021    RDW 16.4 04/01/2021     04/01/2021    MPV 8.8 08/01/2015     CBC with Differential:   Lab Results   Component Value Date    WBC 14.3 04/01/2021    RBC 3.05 04/01/2021    HGB 9.0 04/01/2021    HCT 27.4 04/01/2021     04/01/2021    MCV 89.8 04/01/2021    MCH 29.5 04/01/2021    MCHC 32.8 04/01/2021    RDW 16.4 04/01/2021    NRBC 2 03/31/2021    BANDSPCT 1 03/31/2021    LYMPHOPCT 12.8 04/01/2021    MONOPCT 9.6 04/01/2021    BASOPCT 0.8 04/01/2021    MONOSABS 1.4 04/01/2021    LYMPHSABS 1.8 04/01/2021    EOSABS 0.2 04/01/2021    BASOSABS 0.1 04/01/2021     CMP:    Lab Results   Component Value Date     04/01/2021    K 4.4 04/01/2021    K 4.4 10/19/2020    CL 99 04/01/2021    CO2 28 04/01/2021    BUN 43 04/01/2021    CREATININE 1.37 04/01/2021    GFRAA 45.3 04/01/2021    LABGLOM 37.4 04/01/2021    GLUCOSE 310 04/01/2021    PROT 5.9 03/29/2021    LABALBU 3.2 04/01/2021    CALCIUM 9.7 04/01/2021    BILITOT <0.2 03/29/2021    ALKPHOS 99 03/29/2021    AST 21 03/29/2021    ALT 28 03/29/2021     BMP:    Lab Results   Component Value Date     04/01/2021    K 4.4 04/01/2021    K 4.4 10/19/2020    CL 99 04/01/2021    CO2 28 04/01/2021    BUN 43 04/01/2021    LABALBU 3.2 04/01/2021    CREATININE 1.37 04/01/2021    CALCIUM 9.7 04/01/2021    GFRAA 45.3 04/01/2021    LABGLOM 37.4 04/01/2021    GLUCOSE 310 04/01/2021     TSH:   Lab Results   Component Value Date    TSH 0.474 11/30/2020     Vitamin B12 and Folate: No components found for: FOLIC,  Y56  Urinalysis:   Lab Results   Component Value Date    NITRU Negative 10/18/2020    WBCUA 6-9 10/18/2020    BACTERIA Negative 10/18/2020    RBCUA 3-5 10/18/2020    BLOODU SMALL 10/18/2020    SPECGRAV 1.009 10/18/2020    GLUCOSEU Negative 10/18/2020           FUNCTIONAL ADL´S:     Independent: [ x ] Eating, [ x ] Dressing, [x ] Transferring, [ x ] Toileting, [ x ] Bathing, [ x ] Continence  Dependent   : [  ] Eating, [   ] Dressing, [   ] Transferring, [   ] James Bless, [   ] Wilhemenia Negus, [   ] Continence  W. assistant : [  ] Eating, [   ] Dressing, [   ] Transferring, [   ] James Bless, [   ] Bathing, [   ] Continence    Radiology: Reviewed      Echocardiogram Complete 2d With Doppler With Color     Result Date: 3/19/2021  Transthoracic Echocardiography Report (TTE)  Demographics  Patient Name   Gin Real  Gender              Female                 M  Patient Number [id number]        Race                Black                                 Ethnicity  Visit Number   [id number]       Room Number         IC05  Corporate ID                   Date of Study       03/19/2021  Accession      [id number]      Referring Physician  Number  Date of Birth  1945      Sonographer         Kevin Seo LPN  Age            67 year(s)      Interpreting        [facility]) Cardiology                                 Physician           Fermin Jean MD Procedure Type of Study  TTE procedure:ECHO COMPLETE 2D W/DOP W/COLOR. Procedure Date Date: 03/19/2021 Start: 09:15 AM Study Location: Portable Technical Quality: Good visualization Indications:Cardiac arrest. Patient Status: Routine Weight: 143 pounds BP: 89/48 mmHg Allergies   - Other allergy:(Darvon). Conclusions  Summary  Mitral annular calcification is present. 1-2+ MR  Normal tricuspid valve structure and function. 1+ TR  RVSP 31 mmHg  severe CLVH  LVEF 50%  E/A flow reversal noted. Suggestive of diastolic dysfunction.   Signature  ----------------------------------------------------------------  Electronically signed by Fermin Jean MD(Interpreting  physician) on 03/19/2021 02:13 PM ----------------------------------------------------------------  Findings Left Ventricle severe CLVH LVEF 50% E/A flow reversal noted. Suggestive of diastolic dysfunction. Right Ventricle Normal right ventricle structure and function. Normal right ventricle systolic pressure. Left Atrium Moderately dilated left atrium. Right Atrium Normal right atrium. Mitral Valve Mitral annular calcification is present. 1-2+ MR Tricuspid Valve Normal tricuspid valve structure and function. 1+ TR RVSP 31 mmHg Aortic Valve Normal aortic valve structure and function. Pulmonic Valve The pulmonic valve was not well visualized . Pericardial Effusion No evidence of pericardial effusion. Pleural Effusion No evidence of pleural effusion. Aorta \ Miscellaneous The aorta is within normal limits. M-Mode Measurements (cm)  LVIDd: 4.01 cm                         LVIDs: 1.85 cm  IVSd: 1.45 cm                          IVSs: 1.23 cm  LVPWd: 1.32 cm                         LVPWs: 1.61 cm  Rt. Vent.  Dimension: 1.07 cm           AO Root Dimension: 2.8 cm                                         ACS: 1.47 cm                                         LA: 3.04 cm                                         LVOT: 1.98 cm Doppler Measurements:  AV Velocity:0.01 m/s                   MV Peak E-Wave: 0.96 m/s  AV Peak Gradient: 7.73 mmHg            MV Peak A-Wave: 1.58 m/s  AV Mean Gradient: 3.01 mmHg  AV Area (Continuity):1.27 cm^2         MV P1/2t: 105.6 msec  TR Velocity:2.29 m/s                   MVA by PHT2.08 cm^2  TR Gradient:21.01 mmHg                 Estimated RAP:10 mmHg                                         RVSP:31 mmHg Valves  Mitral Valve  Peak E-Wave: 0.96 m/s          Peak A-Wave: 1.58 m/s  P1/2t: 105.6 msec              E/A Ratio: 0.61                                 Peak Gradient: 3.7 mmHg  Area (PHT): 2.08 cm^2          Deceleration Time: 349.6 msec  MR Velocity: 4.66 m/s  Aortic Valve  Peak Velocity: 1.39 m/s                Mean Velocity: 0.79 m/s  Peak Gradient: 7.73 mmHg               Mean Gradient: 3.01 mmHg  Area (continuity): 1.27 cm^2  AV VTI: 28.28 cm  Cusp Separation: 1.47 cm  Tricuspid Valve  Estimated RVSP: 31 mmHg              Estimated RAP: 10 mmHg  TR Velocity: 2.29 m/s                TR Gradient: 21.01 mmHg  Pulmonic Valve  Peak Velocity: 0.68 m/s           Peak Gradient: 1.86 mmHg                                    Estimated PASP: 31.01 mmHg  LVOT  Peak Velocity: 0.72 m/s              Mean Velocity: 0.41 m/s  Peak Gradient: 2 mmHg                Mean Gradient: 0.9 mmHg  LVOT Diameter: 1.98 cm               LVOT VTI: 11.64 cm Structures  Left Atrium  LA Dimension: 3.04 cm                 LA Area: 18.08 cm^2  LA/Aorta: 1.09  LA Volume/Index: 49.97 ml  Left Ventricle  Diastolic Dimension: 1.04 cm         Systolic Dimension: 3.73 cm  Septum Diastolic: 0.96 cm            Septum Systolic: 4.35 cm  PW Diastolic: 8.87 cm                PW Systolic: 2.38 cm                                       FS: 53.9 %  LV EDV/LV EDV Index: 70.29 ml        LV ESV/LV ESV Index: 10.47 ml  EF Calculated: 85.1 %  LVOT Diameter: 1.98 cm  Right Ventricle  Diastolic Dimension: 9.21 cm                                    RV Systolic Pressure: 57.39 mmHg Aorta/ Miscellaneous Aorta  Aortic Root: 2.8 cm  LVOT Diameter: 1.98 cm     Xr Chest Portable     Result Date: 3/19/2021  EXAMINATION: XR CHEST PORTABLE CLINICAL HISTORY: INTUBATION COMPARISONS: OCTOBER 9, 2020 FINDINGS: Endotracheal tube has migrated 3 cm inferiorly, just lying cephalad to mojagn. Nasogastric tube courses beneath diaphragm. Osseous structures intact. Cardiopericardial silhouette normal. Aorta calcified. Ill-defined area increased opacity right  lung base.      RIGHT LOWER LUNG SUBSEGMENTAL ATELECTASIS/PNEUMONIA. INTERVAL INFERIOR MIGRATION ENDOTRACHEAL TUBE 3 CM WITH TIP NOW JUST PROXIMAL TO MOJGAN. MAY WISH TO RETRACT ENDOTRACHEAL TUBE 2 CM.      Xr Chest Portable     Result Date: 3/19/2021  EXAMINATION: XR CHEST PORTABLE CLINICAL HISTORY: RESPIRATORY FAILURE COMPARISONS: MARCH 16, 2021 FINDINGS: Endotracheal tube tip 2.3 cm superior to mojgan. Nasogastric tube courses beneath diaphragm area osseous structures intact. Ill-defined areas increase opacity at lung bases. Cardiopericardial silhouette normal.      BIBASILAR SUBSEGMENTAL ATELECTASIS/PNEUMONIA IN THIS PARTIALLY EXPIRATORY CHEST RADIOGRAPH. Assessment and plan:    -Advance Care Planning  Discussed goals of care with patient. Explained in extensive detail nuances between full code, DNR CCA and DNR CC. Patient has made the decision to be Limited code previously      -Goals of Care Discussion:  Disease process and goals of treatment were discussed in basic terms. We discussed the palliative care philosophy in light of those goals. We discussed all care options contingent on treatment response and QOL. Much active listening, presence, and emotional support were given. -Aimee's goal is to optimize available comfort care measures to decrease hospitalization and prevent ER visits when possible, manage symptomology with future Palliative Care service. Palliative Care Encounter  -Met with patient for [address], ACP, and initial discussion on pall care philosophy.   - Mel Reed is 67years old with multiple comorbidities which includes nonobstructive CAD, diastolic HF, HOCM, HTN, IDDM2, CKD3, anemia, prolonged hospitalization in 17/1122 complicated by cardiac arrest s/p ROSC,requiring transvenous pacing due to persistent bradycardia and hypotension despite pressor support, SAÚL, and septic shock.  -Considering her comorbidities and her risk for re-hospitalization, Mel Reed is appropriate for Palliative Care Services.  -Call for any questions, concerns or change in condition. Much support, guidance and active listening provided.  -Prognostication cannot be determined at this time.  Appropriate prognostication should be available once patient has completed rehab and reach recovery. 3/30/21- Met with patient and  at bedside. Explained the difference between [facility] and hospice care. More than 35 minutes spent discussion goals of care with patient and her . They have both decided to have palliative care service as outpatient. -PPS today is 30    Patient is currently being treated for multiple medical conditions includin. Cardiac arrest s/p ROSC  2. Acute hypoxic respiratory failure  3. Lactic acidosis  4. SAÚL/CKD3  5. Oral candidiasis  6. UTI with ESBL  7. Risk for delirium: -Optimize pain management  -Avoid delirium causing medications  -Continuous redirection  -Consider utilizing Haldol 0.25 mg TID PRN if patient becomes agitated or having difficulty being redirected. Palliative care will continue to follow as outpatient. Thank you for the opportunity to participate in Aimee's care.     Electronically signed by JOHNATHON Vicente CNP on 2021 at 2:20 PM

## 2021-04-02 NOTE — PROGRESS NOTES
INPATIENT PROGRESS NOTES    PATIENT NAME: Vivian Gomez  MRN: [id number]  SERVICE DATE:  2021   SERVICE TIME:  2:03 PM      PRIMARY SERVICE: Pulmonary Disease    CHIEF COMPLAINTS: Hoarseness and stridor    INTERVAL HPI: Patient seen and examined at bedside, Interval Notes, orders reviewed. Nursing notes noted  Patient doing good, denies chest pain, no shortness of breath, she had some seizure-like activity today and neurology evaluated, she wants to go home today, no lower extremity edema, no nausea no vomiting, voice is better still hoarse    Review of system:     GI Abdominal pain No  Skin Rash No    Social History     Tobacco Use    Smoking status: Former Smoker     Packs/day: 1.00     Years: 59.00     Pack years: 59.00     Types: Cigarettes     Start date: 2017    Smokeless tobacco: Never Used    Tobacco comment: quit 2-3 weeks ago    Substance Use Topics    Alcohol use: Not Currently         Problem Relation Age of Onset    Heart Disease Father         cardiac bypass    Arthritis Father     Arthritis Mother     Other Mother          at age 80    Other Sister         [facility]    No Known Problems Daughter     Stroke Son          OBJECTIVE    Body mass index is 29.77 kg/m². PHYSICAL EXAM:  Vitals:  BP (!) 154/64   Pulse 76   Temp 98.2 °F (36.8 °C) (Oral)   Resp 18   Ht 4' 11\" (1.499 m)   Wt 147 lb 6.4 oz (66.9 kg)   LMP  (LMP Unknown)   SpO2 100%   BMI 29.77 kg/m²     General: alert, cooperative, no distress  Head: normocephalic, atraumatic  Eyes:No gross abnormalities. ENT:  MMM no lesions  Neck:  supple, no masses , minimal stridor, improved significantly  Chest : Good air movement, no wheezing, no rales, nontender, tympanic  Heart[de-identified] Heart sounds are normal.  Regular rate and rhythm without murmur, gallop or rub.   ABD:  symmetric, soft, non-tender, no guarding or rebound  Musculoskeletal : no cyanosis, no clubbing and no edema  Neuro:  Grossly normal  Skin: No rashes or nodules noted. Lymph node:  no cervical nodes  Urology: No Sanchez   Psychiatric: appropriate    DATA:   Recent Labs     03/31/21  0600 04/01/21  0600   WBC 19.3* 14.3*   HGB 9.1* 9.0*   HCT 27.5* 27.4*   MCV 88.6 89.8   * 453*     Recent Labs     03/31/21  0600 04/01/21  0600    135   K 4.2 4.4    99   CO2 26 28   BUN 46* 43*   CREATININE 1.44* 1.37*   GLUCOSE 76 310*   CALCIUM 9.6 9.7   LABALBU 3.0* 3.2*   LABGLOM 35.3* 37.4*   GFRAA 42.7* 45.3*       MV Settings:     Vent Mode: (S) CPAP  Vt Ordered: 350 mL  Rate Set: 16 bmp  FiO2 : 40 %  PEEP/CPAP: 6  Pressure Support: 5 cmH20  Peak Inspiratory Pressure: 14 cmH2O  Mean Airway Pressure: 9 cmH20  I:E Ratio: 1:1.60    No results for input(s): PHART, QDR6LAK, PO2ART, EWA8SSZ, BEART, B3EAJOWC in the last 72 hours.     O2 Device: None (Room air)  O2 Flow Rate (L/min): 8 L/min    Dietary Nutrition Supplements: Diabetic Oral Supplement  DIET CARB CONTROL; Carb Control: 4 carb choices (60 gms)/meal; Dysphagia Soft and Bite-Sized     MEDICATIONS during current hospitalization:    Continuous Infusions:   dextrose      sodium chloride         Scheduled Meds:   levETIRAcetam  500 mg Oral BID    insulin lispro  10 Units Subcutaneous TID WC    insulin glargine  40 Units Subcutaneous QAM AC    carvedilol  50 mg Oral BID    [START ON 4/3/2021] predniSONE  20 mg Oral Daily    Followed by   Danielle Ngo ON 4/6/2021] predniSONE  10 mg Oral Daily    verapamil  120 mg Oral BID    nystatin  5 mL Oral 4x Daily    aspirin  81 mg Oral Daily    ipratropium-albuterol  1 ampule Inhalation TID    insulin lispro  0-12 Units Subcutaneous TID WC    insulin lispro  0-6 Units Subcutaneous Nightly    sodium chloride flush  10 mL Intravenous 2 times per day    cloNIDine  0.2 mg Oral BID    meropenem  1,000 mg Intravenous Q12H    pantoprazole  40 mg Intravenous Daily    And    sodium chloride (PF)  10 mL Intravenous Daily    heparin (porcine)  5,000 Units Subcutaneous 3 times per day    docusate  100 mg Oral BID    polyethylene glycol  17 g Oral Daily    budesonide  0.5 mg Nebulization BID    sodium chloride flush  10 mL Intravenous 2 times per day       PRN Meds:albuterol, sodium chloride flush, glucose, dextrose, glucagon (rDNA), dextrose, sodium chloride, hydrALAZINE **OR** hydrALAZINE, labetalol, labetalol, promethazine **OR** ondansetron, sodium chloride flush, acetaminophen **OR** acetaminophen    Radiology  Chest x-ray reviewed by me, residual small left-sided effusion, no infiltrate      IMPRESSION AND SUGGESTION:  Patient is at risk due to   · Acute hypoxic spelt failure, improving  · Volume overload, improving  · Stridor improved significantly, secondary to pulmonary edema  · ESBL UTI  · Left-sided pleural effusion, likely third spacing    Recommendations    · Watch volume status, avoid overload  · Continue taper prednisone  · Antibiotics per ID  · Incentive spirometry and flutter valve  · BiPAP while asleep  · Will need sleep study as outpatient  · Follow-up with me after discharge  · Okay to RI home from pulmonary point of view if cleared by neurology  · Follow-up with me scheduled for May 3rd     Pulmonary will sign off, please call for any question or concern     Electronically signed by Trace Terrell MD,  FCCP   on 4/2/2021 at 2:03 PM

## 2021-04-02 NOTE — PROGRESS NOTES
Hospitalist Progress Note      PCP: Richi Alonzo MD    Date of Admission: 3/18/2021    Chief Complaint:  No acute events, afebrile, stable HD, on RA, still having tremors again today per nursing staff    Medications:  Reviewed    Infusion Medications    dextrose      sodium chloride       Scheduled Medications    insulin lispro  10 Units Subcutaneous TID WC    insulin glargine  40 Units Subcutaneous QAM AC    carvedilol  50 mg Oral BID    [START ON 4/3/2021] predniSONE  20 mg Oral Daily    Followed by   Samira Kumar ON 4/6/2021] predniSONE  10 mg Oral Daily    verapamil  120 mg Oral BID    nystatin  5 mL Oral 4x Daily    aspirin  81 mg Oral Daily    ipratropium-albuterol  1 ampule Inhalation TID    insulin lispro  0-12 Units Subcutaneous TID WC    insulin lispro  0-6 Units Subcutaneous Nightly    sodium chloride flush  10 mL Intravenous 2 times per day    cloNIDine  0.2 mg Oral BID    meropenem  1,000 mg Intravenous Q12H    pantoprazole  40 mg Intravenous Daily    And    sodium chloride (PF)  10 mL Intravenous Daily    heparin (porcine)  5,000 Units Subcutaneous 3 times per day    docusate  100 mg Oral BID    polyethylene glycol  17 g Oral Daily    budesonide  0.5 mg Nebulization BID    sodium chloride flush  10 mL Intravenous 2 times per day     PRN Meds: albuterol, sodium chloride flush, glucose, dextrose, glucagon (rDNA), dextrose, sodium chloride, hydrALAZINE **OR** hydrALAZINE, labetalol, labetalol, promethazine **OR** ondansetron, sodium chloride flush, acetaminophen **OR** acetaminophen      Intake/Output Summary (Last 24 hours) at 4/2/2021 1037  Last data filed at 4/2/2021 0412  Gross per 24 hour   Intake    Output 2500 ml   Net -2500 ml       Exam:    BP (!) 154/64   Pulse 76   Temp 98.2 °F (36.8 °C) (Oral)   Resp 18   Ht 4' 11\" (1.499 m)   Wt 147 lb 6.4 oz (66.9 kg)   LMP  (LMP Unknown)   SpO2 100%   BMI 29.77 kg/m²     General appearance: awake, alert, cooperative  Lungs: coarse BS bilaterally  Heart: S1S2,RRR  Abdomen: soft, BS active  Extremities: no edema    Labs:   Recent Labs     03/31/21  0600 04/01/21  0600   WBC 19.3* 14.3*   HGB 9.1* 9.0*   HCT 27.5* 27.4*   * 453*     Recent Labs     03/31/21  0600 04/01/21  0600    135   K 4.2 4.4    99   CO2 26 28   BUN 46* 43*   CREATININE 1.44* 1.37*   CALCIUM 9.6 9.7   PHOS 2.6 2.8     No results for input(s): AST, ALT, BILIDIR, BILITOT, ALKPHOS in the last 72 hours. No results for input(s): INR in the last 72 hours. No results for input(s): Subha Gomez in the last 72 hours. Urinalysis:      Lab Results   Component Value Date    NITRU Negative 10/18/2020    WBCUA 6-9 10/18/2020    BACTERIA Negative 10/18/2020    RBCUA 3-5 10/18/2020    BLOODU SMALL 10/18/2020    SPECGRAV 1.009 10/18/2020    GLUCOSEU Negative 10/18/2020       Radiology:  CT HEAD WO CONTRAST   Final Result      There are chronic involutional atrophic changes and evidence of prior remote ischemia stable since the previous studies      XR CHEST PORTABLE   Final Result      INTERVAL EXTUBATION. OTHERWISE, ESSENTIALLY STABLE CHEST COMPARED TO 3/25/2021. FL MODIFIED BARIUM SWALLOW W VIDEO   Final Result      ESSENTIALLY NEGATIVE MODIFIED BARIUM SWALLOW. A FULL REPORT WILL BE MADE BY THE SPEECH PATHOLOGY TEAM.               IR PICC WO SQ PORT/PUMP > 5 YEARS   Final Result      SUCCESSFUL PICC PLACEMENT WITHOUT IMMEDIATE COMPLICATIONS. 6.6 mGy of fluoroscopy was used, with 1 fluoroscopic still saved. No diagnostic images were obtained. XR CHEST PORTABLE   Final Result   LEFT PLEURAL EFFUSION. BILATERAL LOWER LUNG ATELECTASIS/PNEUMONIA. XR CHEST PORTABLE   Final Result   No radiographic evidence of acute intrathoracic process. There is an NG tube coursing into the stomach. There is a left pleural effusion and likely bibasilar atelectasis or infiltrate.             CT HEAD WO CONTRAST Final Result   Impression:      Mild cerebral atrophy. Chronic ischemic white matter disease. Remote left thalamic lacunar infarct. Bilateral ethmoid and sphenoid sinusitis. All CT scans at this facility use dose modulation, iterative reconstruction, and/or weight based dosing when appropriate to reduce radiation dose to as low as reasonably achievable. XR CHEST PORTABLE   Final Result   INTERVAL RETRACTION OF THE ENDOTRACHEAL TUBE IN THE TRACHEA. CONSIDER RETRACTING ENDOTRACHEAL TUBE 1 CM. LEFT LOWER LUNG ATELECTASIS. XR CHEST PORTABLE   Final Result      CHEST ENDOTRACHEAL TUBE IN RIGHT MAINSTEM BRONCHUS. RECOMMENDING RETRACTING ENDOTRACHEAL TUBE 7 CM. INTERVAL DEVELOPMENT OF LEFT MID AND LEFT LOWER LUNG ATELECTASIS. INTERVAL RESOLUTION OF RIGHT LOWER LUNG ATELECTASIS. ABOVE FINDINGS DISCUSSED WITH PATIENT'S NURSE IGNACIO, VIA TELEPHONE, IN THE ICU, AT [address] [place] [address], MARCH 20, 2021. XR CHEST PORTABLE   Final Result   BIBASILAR SUBSEGMENTAL ATELECTASIS/PNEUMONIA IN THIS PARTIALLY EXPIRATORY CHEST RADIOGRAPH. XR CHEST PORTABLE   Final Result   RIGHT LOWER LUNG SUBSEGMENTAL ATELECTASIS/PNEUMONIA. INTERVAL INFERIOR MIGRATION ENDOTRACHEAL TUBE 3 CM WITH TIP NOW JUST PROXIMAL TO FAISAL. MAY WISH TO RETRACT ENDOTRACHEAL TUBE 2 CM.               Assessment/Plan:    67 y.o. female with PMH of nonobstructive CAD, diastolic HF, HOCM, HTN, IDDM2, CKD3, anemia, prolonged hospitalization in 52/8182 complicated by cardiac arrest s/p ROSC, SAÚL,septic shock who presented with:     Cardiac arrest s/p ROSC   - required Dopamine, norepinephrine and epinephrine infusion  - TTE showed LVEF of 50%, severe LVH  - transferred of of ICU  - remains hypertensive, meds adjusted by cardiology      Acute hypoxic respiratory failure  - requiring intubation and mechanical ventilation  - extubated on 3/22, but had to be re-intubated same day due to stridor and respiratory distress  - extubated on 3/26 to BIPAP, no re-intubation per family  - on RA  - switched from IV Solumedrol to prednisone taper  - will need sleep study as outpatient per pulmonology    Acute encephalopathy  - likely hypoxic-ischemic etiology from cardiac arrest  - CT head was negative per report  - improved  - had a brief episode of altered mental status with eye-rolling, tongue protrusion and shaking of the head and arms per nursing staff,   - repeat CT head was negative  - has been experiencing similar episodes at home since her first cardiac arrest in 10/2021  - started on Keppra by neurology     Acute / chronic anemia   - with Hb of 5.8 on arrival, FOBT was positive  - improved s/p PRBCs given in ED  - follow H/H      SAÚL/CKD3  - improved  - no ACEI/ARB on d/c per cardiology     IDDM2 with hyperglycemia  - not well controlled with Glc in the 300-400s, probably worsened by steroid therapy  - on Lantus, premeal coverage, ISS per endocrinology     ESBL E.Coli UTI / aspiration pneumonia  - completed IV meropenem per ID     Dysphagia   - MBS was negative per speech therapy    Dysphonia   - likely due to recent intubation and acute illness  - outpatient f/u per ENT    Code status - [address],  met with hospice, not ready to make a decision for now      Disposition - home with [facility] today            Electronically signed by Lenard Sarabia MD on 4/2/2021 at 10:37 AM

## 2021-04-02 NOTE — FLOWSHEET NOTE
Discharge instructions given to patient and . Both verbalize understanding. Transporter here to wheel patient out. Electronically signed by Marie Diallo on 4/2/2021 at 4:51 PM

## 2021-04-02 NOTE — PROGRESS NOTES
Progress Note  Date:2021       Room:[place] [address]-  Patient Lauren Verdin     YOB: 1945     Age:75 y.o. Chief complaint uncontrolled diabetes    Subjective    Subjective:  Symptoms:  Stable. She reports malaise and weakness. Diet:  Adequate intake. Activity level: Impaired due to weakness. Review of Systems   Neurological: Positive for weakness. Objective         Vitals Last 24 Hours:  TEMPERATURE:  Temp  Av.3 °F (36.8 °C)  Min: 98.1 °F (36.7 °C)  Max: 98.6 °F (37 °C)  RESPIRATIONS RANGE: Resp  Av.7  Min: 16  Max: 20  PULSE OXIMETRY RANGE: SpO2  Av %  Min: 100 %  Max: 100 %  PULSE RANGE: Pulse  Av.2  Min: 81  Max: 93  BLOOD PRESSURE RANGE: Systolic (24XSM), RZQ:864 , Min:128 , V   ; Diastolic (16PJA), YBW:10, Min:50, Max:112    I/O (24Hr): Intake/Output Summary (Last 24 hours) at 2021 2143  Last data filed at 2021 1752  Gross per 24 hour   Intake 240 ml   Output 1500 ml   Net -1260 ml     Objective:  General Appearance:  Ill-appearing. Vital signs: (most recent): Blood pressure (!) 135/50, pulse 89, temperature 98.6 °F (37 °C), temperature source Oral, resp. rate 18, height 4' 11\" (1.499 m), weight 147 lb 6.4 oz (66.9 kg), SpO2 100 %, not currently breastfeeding. Vital signs are normal.    HEENT: Normal HEENT exam.    Lungs:  Normal effort and normal respiratory rate. Heart: Normal rate. Abdomen: Abdomen is soft. Extremities: Normal range of motion. Neurological: Patient is alert. Skin:  There is ecchymosis. Results for Jamie Chapin (MRN [id number]) as of 2021 21:44   Ref.  Range 2021 07:29 2021 11:18 2021 15:40 2021 17:28 2021 20:23   POC Glucose Latest Ref Range: 60 - 115 mg/dl 329 (H) 359 (H) 267 (H)  442 (HH)       Labs/Imaging/Diagnostics    Labs:  CBC:  Recent Labs     21  0533 21  0600 21  0600   WBC 14.2* 19.3* 14.3*   RBC 3.17* 3.10* 3.05*   HGB 9.2* 9.1* 9.0* HCT 28.4* 27.5* 27.4*   MCV 89.5 88.6 89.8   RDW 15.9* 16.5* 16.4*   * 484* 453*     CHEMISTRIES:  Recent Labs     03/30/21  0537 03/31/21  0600 04/01/21  0600    138 135   K 4.7 4.2 4.4    100 99   CO2 26 26 28   BUN 39* 46* 43*   CREATININE 1.25* 1.44* 1.37*   GLUCOSE 185* 76 310*   PHOS 3.1 2.6 2.8   MG 1.7 1.7 1.7     PT/INR:No results for input(s): PROTIME, INR in the last 72 hours. APTT:No results for input(s): APTT in the last 72 hours. LIVER PROFILE:No results for input(s): AST, ALT, BILIDIR, BILITOT, ALKPHOS in the last 72 hours. Imaging Last 24 Hours:  Ct Head Wo Contrast    Result Date: 4/1/2021  EXAMINATION: CT HEAD WO CONTRAST, 4/1/2021 5:28 PM CLINICAL HISTORY:  altered mental status COMPARISON: Brain CT from March 9, 2001 and October 9, 2020 TECHNIQUE:  Multiple contiguous axial images of the head were obtained from the skull base through the skull vertex without intravenous contrast. Sagittal and coronal reformats have been produced. All CT scans at this facility use dose modulation, iterative reconstruction, and/or weight based dosing when appropriate to reduce radiation dose to as low as reasonably achievable. BRAIN CT FINDINGS: Gray-white matter differentiation is maintained. No acute hemorrhage, mass, mass effect, or midline shift. There is prominence of sulci and ventricles indicating mild global cerebral atrophy and chronic involutional changes. There is redemonstration of an area of prior remote ischemia and encephalomalacia in the right periventricular parietal lobe which hasn't present on the prior studies and has remained unchanged. There are no new similar findings. There is a small lacunar  infarct versus dilated perivascular space in the left basal ganglia. Moderate periventricular white matter hypodensities indicating chronic microangiopathy are noted. The basal ganglia are within normal limits.  There are no acute changes or space-occupying lesions in the posterior fossa. The visualized portions of the orbits are within normal limits. The globes are intact. The imaged portions of the paranasal sinuses are unremarkable. The calvarium is intact. There are chronic involutional atrophic changes and evidence of prior remote ischemia stable since the previous studies    Assessment//Plan           Hospital Problems           Last Modified POA    Encephalopathy acute 3/29/2021 Yes    Cardiac arrest ([address] 3/18/2021 Yes    Acute respiratory failure with hypoxia ([address] 3/22/2021 Yes    Goals of care, counseling/discussion 3/29/2021 Yes    Bradycardia 3/29/2021 Yes    Moderate hypoxic-ischemic encephalopathy 3/29/2021 Yes        Assessment:    Condition: In serious condition. Improving. (Uncontrolled type 2 diabetes with labile glucose  Improving p.o. intake  Encephalopathy improving with some fluctuation  Status post cardiorespiratory arrest and hypoxia ischemic encephalopathy  ESBL UTI E. coli). Plan:   (Increase Lantus to 40 units in the morning plus Humalog 10 with each meals  Continue Humalog coverage  Monitor glycemic control closely).        Electronically signed by Claudine Mcwilliams MD on 4/1/21 at 9:43 PM EDT

## 2021-04-03 ENCOUNTER — CARE COORDINATION (OUTPATIENT)
Dept: CASE MANAGEMENT | Age: 76
End: 2021-04-03

## 2021-04-03 NOTE — CONSULTS
Nicole Raya [address]                      [address], [address] [facility]                                  CONSULTATION    PATIENT NAME: Felipa Matute                   :        1945  MED REC NO:   [id number]                            ROOM:       W163  ACCOUNT NO:   [de-identified]                           ADMIT DATE: 2021  PROVIDER:     Alie Drake MD      SUBJECTIVE:  The patient is doing somewhat better with her voice  according to her  she states that she is doing somewhat better. I had explained to her that she does have some polypoid changes. The patient's chart was reviewed and labs were reviewed. The patient  had a CT head. This did not reveal any evidence of her sinus infection  or any mastoiditis. ASSESSMENT:  1. Dysphonia, acute and improving. 2.  Status post cardiac arrest.  3.  Status post intubation and extubation. PLAN:  I will follow the patient in the office as needed if not improved  in few weeks. I also told the  that she needs to take plenty of  liquids, so as not to have dehydration and to rest her voice somewhat.         Lafayette Babinski, MD    D: 2021 14:14:59       T: 2021 16:30:36     [email or web address]  Job#: [id number]     Doc#: [id number]    CC:  Alie Drake MD

## 2021-04-03 NOTE — CARE COORDINATION
[facility] Transitions Initial Follow Up Call    Call within 2 business days of discharge: Yes    Patient: Andrey Loo Patient : 1945   MRN: <[id number]>  Reason for Admission:Cardiopulmonary arrest  Discharge Date: 21 RARS: Readmission Risk Score: 40      Last Discharge [address] [address]       Complaint Diagnosis Description Type Department Provider    3/18/21 Respiratory Distress Cardiopulmonary arrest ([facility] . .. ED to Hosp-Admission (Discharged) (ADMITTED) DEANA Mendoza MD; Ramona Noonan. .. First attemptt at initial 24 hour CTN call     Spoke with: Called to speak with patient for initial  transition of care. Left HIPPA compliant voice message with contact information [phone] for a call  Back with an update.     Facility: [facility] provided:      Care Transitions 24 Hour Call    Do you have all of your prescriptions and are they filled?: Yes  Patient DME: Straight cane, Walker, Shower chair  Do you have support at home?: Partner/Spouse/SO  Are you an active caregiver in your home?: No  Care Transitions Interventions         Follow Up  Future Appointments   Date Time Provider Zaid Lr   2021  9:30 AM Wagner Valladares  [place] [address]   2021  1:15 PM Chauncey Gonsalves  [facility]   2021 11:00 AM JOHNATHON Stephenson - CNP [facility]   5/3/2021  1:00 PM Patricia Laguerre  [place], [place]

## 2021-04-04 NOTE — PROGRESS NOTES
Physical Therapy  Facility/Department: [facility] MED SURG [id number]  Physical Therapy Discharge      NAME: Jessica Ledbetter    : 1945 (67 y.o.)  MRN: [id number]    Account: [de-identified]  Gender: female      Patient has been discharged from acute care hospital. DC patient from current PT program.      Electronically signed by Raleigh Elder PT on 21 at 4:17 PM EDT

## 2021-04-05 ENCOUNTER — TELEPHONE (OUTPATIENT)
Dept: FAMILY MEDICINE CLINIC | Age: 76
End: 2021-04-05

## 2021-04-05 PROCEDURE — 95816 EEG AWAKE AND DROWSY: CPT | Performed by: PSYCHIATRY & NEUROLOGY

## 2021-04-06 ENCOUNTER — CARE COORDINATION (OUTPATIENT)
Dept: CASE MANAGEMENT | Age: 76
End: 2021-04-06

## 2021-04-06 NOTE — CARE COORDINATION
[facility] Transitions Follow Up Call    2021    Patient: Teresa Pinon  Patient : 1945   MRN: [id number]  Reason for Admission: Encephalopathy acute    Cardiac arrest [facility])   Discharge Date: 21 RARS: Readmission Risk Score: 40         Spoke with: Pt. And her  states she is doing well. Pt. Has a weakened voice. She states she is \"so glad to be home\" Cain Gomez states she does get short of breath on exertion. Does not use oxygen in the home. She has a good appetite and is taking all of her medications.  Immanuel Bachelor states they did start the Keppra and prednisone. Discussed pt. Being diabetic and use of prednisone causing elevated glucose levels. FBS today was 218. [place] checks pt's blood pressure daily. /59. Cain Gomez is still weak and needs assist x1 to the bathroom. Bowel and bladder WNL. Pt. Vidhi Willis to have [address] coming to the home. PT is starting today. Pt and  verbalize no new issues or concerns. Will continue to monitor. Needs to be reviewed by the provider   Additional needs identified to be addressed with provider No  none           Method of communication with provider : none    Discussed COVID-19 related testing which was available at this time. Test results were negative. Patient informed of results, if available? Yes    Care Transition Nurse (CTN) contacted the patient by telephone to follow up after admission on 3/18/21. Verified name and  with patient as identifiers. Addressed changes since last contact: symptom management-dyspnea, self management-check glucose and blood pressure daily and follow up [address] cardiology  Discharged needs reviewed: home health care-[facility] and medications-KEPPRA AND DECADRON  Follow up appointment completed? Yes    Advance Care Planning:   Does patient have an Advance Directive:  reviewed and current.      CTN reviewed discharge instructions, medical action plan and red flags with patient and discussed any

## 2021-04-07 NOTE — PROCEDURES
[address]                      [facility], [address] [facility]                          ELECTROENCEPHALOGRAM REPORT    PATIENT NAME: Coreen Roche                   :        1945  MED REC NO:   [id number]                            ROOM:       [id number]  ACCOUNT NO:   [de-identified]                           ADMIT DATE: 2021  PROVIDER:     Fernanda Perez MD    DATE OF EE2021    EEG FINDINGS:  The background rhythm of this EEG shows significant  surface muscle artifacts. In between this, there appears to be slowing  which is mostly posterior bilaterally with a theta rhythm seen  throughout the EEG recording without any other asymmetries or sharp  waves. No definite seizures or epileptiform discharges noted. Sleep  patterns with some drowsy patterns are intermixed. IMPRESSION:  This is a mildly abnormal EEG recording by the virtue of  diffuse slowing consistent with diffuse cerebral dysfunction seen in  toxic metabolic etiology. Patient had a cardiac arrest and there may be  some suggestion of hypoxemia here. Clinical correlation is recommended.         Dedra Estrada MD    D: 2021 10:42:42       T: 2021 10:47:12     [email or web address]  Job#: [id number]     Doc#: [id number]    CC:

## 2021-04-08 ENCOUNTER — TELEPHONE (OUTPATIENT)
Dept: FAMILY MEDICINE CLINIC | Age: 76
End: 2021-04-08

## 2021-04-09 ENCOUNTER — VIRTUAL VISIT (OUTPATIENT)
Dept: FAMILY MEDICINE CLINIC | Age: 76
End: 2021-04-09
Payer: MEDICARE

## 2021-04-09 DIAGNOSIS — D50.0 IRON DEFICIENCY ANEMIA DUE TO CHRONIC BLOOD LOSS: ICD-10-CM

## 2021-04-09 DIAGNOSIS — K62.5 RECTAL BLEEDING: ICD-10-CM

## 2021-04-09 DIAGNOSIS — D64.9 CHRONIC ANEMIA: Primary | ICD-10-CM

## 2021-04-09 DIAGNOSIS — I10 ESSENTIAL HYPERTENSION: ICD-10-CM

## 2021-04-09 DIAGNOSIS — N18.30 STAGE 3 CHRONIC KIDNEY DISEASE, UNSPECIFIED WHETHER STAGE 3A OR 3B CKD (HCC): ICD-10-CM

## 2021-04-09 DIAGNOSIS — I46.9 CARDIAC ARREST (HCC): ICD-10-CM

## 2021-04-09 DIAGNOSIS — I50.9 CONGESTIVE HEART FAILURE, UNSPECIFIED HF CHRONICITY, UNSPECIFIED HEART FAILURE TYPE (HCC): ICD-10-CM

## 2021-04-09 LAB
BASOPHILS ABSOLUTE: 0 K/UL (ref 0–0.2)
BASOPHILS RELATIVE PERCENT: 0.5 %
EOSINOPHILS ABSOLUTE: 0.3 K/UL (ref 0–0.7)
EOSINOPHILS RELATIVE PERCENT: 4.3 %
HCT VFR BLD CALC: 28.7 % (ref 37–47)
HEMOGLOBIN: 9.1 G/DL (ref 12–16)
LYMPHOCYTES ABSOLUTE: 1.1 K/UL (ref 1–4.8)
LYMPHOCYTES RELATIVE PERCENT: 16.7 %
MCH RBC QN AUTO: 28.6 PG (ref 27–31.3)
MCHC RBC AUTO-ENTMCNC: 31.8 % (ref 33–37)
MCV RBC AUTO: 90 FL (ref 82–100)
MONOCYTES ABSOLUTE: 0.5 K/UL (ref 0.2–0.8)
MONOCYTES RELATIVE PERCENT: 7.6 %
NEUTROPHILS ABSOLUTE: 4.5 K/UL (ref 1.4–6.5)
NEUTROPHILS RELATIVE PERCENT: 70.9 %
PDW BLD-RTO: 16.9 % (ref 11.5–14.5)
PLATELET # BLD: 500 K/UL (ref 130–400)
RBC # BLD: 3.19 M/UL (ref 4.2–5.4)
WBC # BLD: 6.3 K/UL (ref 4.8–10.8)

## 2021-04-09 PROCEDURE — 1111F DSCHRG MED/CURRENT MED MERGE: CPT | Performed by: FAMILY MEDICINE

## 2021-04-09 PROCEDURE — 99495 TRANSJ CARE MGMT MOD F2F 14D: CPT | Performed by: FAMILY MEDICINE

## 2021-04-09 NOTE — PROGRESS NOTES
TELEHEALTH EVALUATION -- Audio/Visual (During FBLQJ-99 public health emergency)        Post-Discharge Transitional Care Management Services or Hospital Follow Up      Vicki Roca   YOB: 1945    Date of Office Visit:  4/9/2021  Date of Hospital Admission: 3/18/21  Date of Hospital Discharge: 4/2/21  Risk of hospital readmission (high >=14%.  Medium >=10%) :Readmission Risk Score: 44      Care management risk score Rising risk (score 2-5) and Complex Care (Scores >=6): 5     Non face to face  following discharge, date last encounter closed (first attempt may have been earlier): 4/4/2021  1:05 PM    Call initiated 2 business days of discharge: Yes    Patient Active Problem List   Diagnosis    Hypertension    Hyperlipidemia    Uncontrolled type 2 diabetes mellitus with complication, without long-term current use of insulin (HCC)    Fibromyalgia    Anxiety    Smoker    Insomnia    DJD (degenerative joint disease), lumbar    Lumbosacral radiculopathy at S1    DDD (degenerative disc disease), lumbar    Dysphonia    CTS (carpal tunnel syndrome)    High risk medication use-Norco - 12/20/17 OARRS PM&R, 02/20/18 OARRS PM&R, 03/07/18 OARRS PM&R, Urine Drug screen negative 02/06/17 PM&R--MED CONTRACT 2/6/17    SOB (shortness of breath) on exertion    Chest pain    Memory deficit    Artificial lens present    Presbyopia    Astigmatism, regular    Cataract, nuclear sclerotic senile    IDDM (insulin dependent diabetes mellitus)    Regular astigmatism    Nuclear senile cataract    Neck pain    Lumbosacral radiculopathy at L5    Spinal stenosis of lumbar region with neurogenic claudication    Impaired mobility and activities of daily living    Non-compliant patient NO showed FU 1/10/17 Dr Tracey Adams Insomnia secondary to chronic pain    Reactive depression    Diabetic radiculopathy (HCC)    Cervical radicular pain    Diabetic asymmetric polyneuropathy (HCC)    Myalgia    Intercostal neuropathy    Osteoarthritis of spine with radiculopathy, lumbar region    Acute combined systolic and diastolic CHF, NYHA class 1 (HCC)    PMB (postmenopausal bleeding)    Acute on chronic diastolic heart failure (HCC)    CHF (congestive heart failure) ([facility]    Hypertensive urgency    Uncontrolled type 2 diabetes mellitus with hyperglycemia (HCC)    Chronic obstructive pulmonary disease with acute exacerbation (HCC)    Acute on chronic diastolic (congestive) heart failure (HCC)    SAÚL (acute kidney injury) [facility] [address]    Hypoglycemia    HOCM (hypertrophic obstructive cardiomyopathy) ([facility])    Gastrointestinal hemorrhage    COPD exacerbation (HCC)    Anemia    AVM (arteriovenous malformation)    Symptomatic anemia    Flash pulmonary edema (HCC)    Acute on chronic kidney failure (HCC)    Dysarthria    Chronic fatigue    Encephalopathy acute    Adenomatous polyp of sigmoid colon    Adenomatous polyp of transverse colon    Sepsis ([facility])    Major depressive disorder in remission ([facility] [address]    Gastroesophageal reflux disease without esophagitis    Acute cystitis without hematuria    CKD (chronic kidney disease) stage 4, GFR 15-29 ml/min ([facility])    Cardiopulmonary arrest ([facility])    Gait abnormality    Late effects of CVA (cerebrovascular accident)    Cardiac arrest ([address]    Acute respiratory failure with hypoxia ([facility])    Goals of care, counseling/discussion    Bradycardia    Moderate hypoxic-ischemic encephalopathy    Infection due to ESBL-producing Escherichia coli       Allergies   Allergen Reactions    Ibuprofen Nausea Only    Metformin And Related      Diarrhea      Darvon [Propoxyphene Hcl] Nausea And Vomiting       Medications listed as ordered at the time of discharge from [facility], [address] [place] [address] Medication Instructions WILTON:    Printed on:04/09/21 1032   Medication Information                      ACCU-CHEK PHYLLIS PLUS strip  Test 3x daily Dx E11.65 Accu-Chek Softclix Lancets MISC  bid             aspirin 81 MG EC tablet  Take 1 tablet by mouth daily             Blood Glucose Monitoring Suppl (ACCU-CHEK PHYLLIS CONNECT) w/Device KIT  1 kit by Does not apply route daily             blood glucose test strips (ACCU-CHEK PHYLLIS PLUS) strip  Patient test 3x daily E65.11             carvedilol (COREG) 25 MG tablet  Take 2 tablets by mouth 2 times daily             cloNIDine (CATAPRES) 0.2 MG tablet  Take 1 tablet by mouth 2 times daily             Continuous Blood Gluc  (FREESTYLE MURALI 14 DAY READER) [place]  As directed             Continuous Blood Gluc Sensor (FREESTYLE MURALI 14 DAY SENSOR) MISC  Use every 2 weeks             dicyclomine (BENTYL) 10 MG capsule  Take 1 capsule by mouth 4 times daily as needed (abdominal pain)             insulin glargine (LANTUS) 100 UNIT/ML injection vial  60 units at bedtime             insulin lispro (HUMALOG) 100 UNIT/ML injection vial  18 units at each mels             ipratropium-albuterol (DUONEB) 0.5-2.5 (3) MG/3ML SOLN nebulizer solution  Inhale 3 mLs into the lungs 3 times daily             levETIRAcetam (KEPPRA) 500 MG tablet  Take 1 tablet by mouth 2 times daily             ondansetron (ZOFRAN) 4 MG tablet  Take 1 tablet by mouth every 8 hours as needed for Nausea or Vomiting             pantoprazole (PROTONIX) 40 MG tablet  Take 40mg twice daily (1st dose 30min before breakfast) for 30 days.  After 30 days, you make take 40mg once daily before breakfast.             PARoxetine (PAXIL) 40 MG tablet  TAKE 1 TABLET BY MOUTH ONCE DAILY IN THE MORNING             rosuvastatin (CRESTOR) 40 MG tablet  TAKE 1 TABLET BY MOUTH ONCE DAILY IN THE EVENING             verapamil (CALAN SR) 120 MG extended release tablet  Take 1 tablet by mouth daily                   Medications marked \"taking\" at this time  No outpatient medications have been marked as taking for the 4/9/21 encounter (Virtual Visit) with Leesa Gary MD. Medications patient taking as of now reconciled against medications ordered at time of hospital discharge: Yes    Chief Complaint   Patient presents with   [address] from Hospital       History of Present illness - Follow up of Hospital diagnosis(es):     Inpatient course: Discharge summary reviewed- see chart. Interval history/Current status:     Patient is a 69-year-old female with a complicated past medical history consisting of hypertension, hypertrophic obstructive cardiomyopathy, systolic and diastolic congestive heart failure, AVM, chronic anemia, CKD, COPD, acute respiratory failure with hypoxia, GERD, adenomatous polyp of the transverse colon and sigmoid colon, uncontrolled type 2 diabetes presenting for hospital follow-up. Patient was recently in the hospital after family member stated that she increase her blood pressure medication on her own. As result patient became hypotensive and was drinking a lot of fluids to compensate. As result of drinking a lot of fluid patient became fluid overloaded and was admitted to the hospital.  While in the hospital patient went into cardiac arrest and was resuscitated. Patient was sent to the ICU where she was managed by several specialists for her acute hypoxic respiratory failure, acute encephalopathy, acute on chronic anemia (patient had a hemoglobin of 5.8 on arrival with an FOBT that was positive), SAÚL on CKD 3, uncontrolled diabetes, and a urinary tract infection and aspiration pneumonia. Patient was in the hospital for hypotension. She was increasing her bp medication on her own. Patient's cardiologist told her to take more fluids to help with the low blood pressure. Due to the increase fluid intake she was having difficulty breathing     A comprehensive review of systems was negative except for what was noted in the HPI. There were no vitals filed for this visit. There is no height or weight on file to calculate BMI.    Wt Readings from Last 3 Encounters:   03/31/21 147 lb 6.4 oz (66.9 kg)   01/15/21 131 lb (59.4 kg)   11/27/20 120 lb (54.4 kg)     BP Readings from Last 3 Encounters:   04/02/21 (!) 154/64   01/15/21 124/70   11/27/20 110/60        Physical Exam: Virtual visit. Unable to thoroughly examine patient. PHYSICAL EXAMINATION:  [ INSTRUCTIONS:  \"[x]\" Indicates a positive item  \"[]\" Indicates a negative item  -- DELETE ALL ITEMS NOT EXAMINED]  Vital Signs: (As obtained by patient/caregiver or practitioner observation)    Blood pressure-  Heart rate-    Respiratory rate-    Temperature-  Pulse oximetry-     Constitutional: [x] Appears well-developed and well-nourished [] No apparent distress      [] Abnormal-   Mental status  [x] Alert and awake  [x] Oriented to person/place/time [x]Able to follow commands      Eyes:  EOM    [x]  Normal  [] Abnormal-  Sclera  [x]  Normal  [] Abnormal -         Discharge []  None visible  [] Abnormal -    HENT:   [x] Normocephalic, atraumatic. [] Abnormal   [] Mouth/Throat: Mucous membranes are moist.     External Ears [x] Normal  [] Abnormal-     Neck: [x] No visualized mass     Pulmonary/Chest: [x] Respiratory effort normal.  [] No visualized signs of difficulty breathing or respiratory distress        [] Abnormal-      Musculoskeletal:   [] Normal gait with no signs of ataxia         [x] Normal range of motion of neck        [x]  laying in her hospital bed. Unable to assess gait. Neurological:        [x] No Facial Asymmetry (Cranial nerve 7 motor function) (limited exam to video visit)          [] No gaze palsy        [] Abnormal-         Skin:        [x] No significant exanthematous lesions or discoloration noted on facial skin         [] Abnormal-            Psychiatric:       [x] Normal Affect [] No Hallucinations        [] Abnormal-     Other pertinent observable physical exam findings-     Assessment/Plan:      1. Chronic anemia  Acute on chronic anemia.   When patient was in the hospital her hemoglobin was 5.8. Increased with PRBCs. Patient was noted to have an FOBT that was positive. I do not see any records of gastroenterology monitoring the patient in the ICU or hospital.  Given that patient has history of AVMs, I would recommend patient have follow-up with her gastroenterologist.  We will repeat her CBC to see if her levels are stable. If low, may need to put on an iron tablet. It appears the patient has a mixed picture of anemia.  - CBC With Auto Differential; Future  - Claudell Formica, MD, Gastroenterology, Loc    2. Rectal bleeding  FOBT positive in the hospital.  Patient is status post 1 unit of PRBCs. Family concerned that her levels are dropping him. We will repeat CBC. Let patient follow-up with gastroenterology. Patient did have a scope (capsule) with gastroenterology in early December/January. However at that time patient was not bleeding.  - CBC With Auto Differential; Future  - Claudell Formica, MD, Gastroenterology, Loc    3. Cardiac arrest [facility])  Status post ROSC patient has follow-ups with palliative care. 4. Uncontrolled type 2 diabetes mellitus with complication, without long-term current use of insulin ([facility] [address]  Followed by endocrinology. Family, sugar levels have been stable. Lab Results   Component Value Date    LABA1C 9.6 (H) 03/19/2021     No results found for: EAG      5. Essential hypertension  Stable at this time. Patient hold off on ACE inhibitors due to her hypotensive episode and SAÚL on CKD. 6. Iron deficiency anemia due to chronic blood loss  If levels are not stable will likely need to be on iron tablet. Per family members patient did not finish her series of iron infusions. 7. Congestive heart failure, unspecified HF chronicity, unspecified heart failure type ([facility] [address]  Stable at this time. Denies any issues with fluid overload.         Medical Decision Making: moderate complexity      4/9/2021        ASSESSMENT/PLAN:    John Dangelo, was evaluated through a synchronous (real-time) audio-video encounter. The patient (or guardian if applicable) is aware that this is a billable service. Verbal consent to proceed has been obtained within the past 12 months. The visit was conducted pursuant to the emergency declaration under the [address] [place] [address] and the [employer] and [employer] General Act. Patient identification was verified, and a caregiver was present when appropriate. The patient was located in a state where the provider was credentialed to provide care. Total time spent on this encounter: 30 minutes    --Chanell Eid MD on 4/9/2021 at 10:48 AM    An electronic signature was used to authenticate this note.

## 2021-04-12 ENCOUNTER — TELEPHONE (OUTPATIENT)
Dept: FAMILY MEDICINE CLINIC | Age: 76
End: 2021-04-12

## 2021-04-13 ENCOUNTER — CARE COORDINATION (OUTPATIENT)
Dept: CASE MANAGEMENT | Age: 76
End: 2021-04-13

## 2021-04-16 ENCOUNTER — CARE COORDINATION (OUTPATIENT)
Dept: CASE MANAGEMENT | Age: 76
End: 2021-04-16

## 2021-04-16 ENCOUNTER — VIRTUAL VISIT (OUTPATIENT)
Dept: CARDIOLOGY CLINIC | Age: 76
End: 2021-04-16
Payer: MEDICARE

## 2021-04-16 DIAGNOSIS — I50.41 ACUTE COMBINED SYSTOLIC AND DIASTOLIC CHF, NYHA CLASS 1 (HCC): Primary | ICD-10-CM

## 2021-04-16 DIAGNOSIS — I10 ESSENTIAL HYPERTENSION: ICD-10-CM

## 2021-04-16 DIAGNOSIS — E78.2 MIXED HYPERLIPIDEMIA: ICD-10-CM

## 2021-04-16 DIAGNOSIS — I42.1 HOCM (HYPERTROPHIC OBSTRUCTIVE CARDIOMYOPATHY) (HCC): ICD-10-CM

## 2021-04-16 DIAGNOSIS — I16.0 HYPERTENSIVE URGENCY: ICD-10-CM

## 2021-04-16 DIAGNOSIS — I95.2 HYPOTENSION DUE TO DRUGS: ICD-10-CM

## 2021-04-16 DIAGNOSIS — I25.10 CORONARY ARTERY DISEASE INVOLVING NATIVE CORONARY ARTERY OF NATIVE HEART WITHOUT ANGINA PECTORIS: ICD-10-CM

## 2021-04-16 DIAGNOSIS — N18.4 STAGE 4 CHRONIC KIDNEY DISEASE (HCC): ICD-10-CM

## 2021-04-16 PROCEDURE — 1111F DSCHRG MED/CURRENT MED MERGE: CPT | Performed by: INTERNAL MEDICINE

## 2021-04-16 PROCEDURE — G8400 PT W/DXA NO RESULTS DOC: HCPCS | Performed by: INTERNAL MEDICINE

## 2021-04-16 PROCEDURE — 1123F ACP DISCUSS/DSCN MKR DOCD: CPT | Performed by: INTERNAL MEDICINE

## 2021-04-16 PROCEDURE — 4040F PNEUMOC VAC/ADMIN/RCVD: CPT | Performed by: INTERNAL MEDICINE

## 2021-04-16 PROCEDURE — 99214 OFFICE O/P EST MOD 30 MIN: CPT | Performed by: INTERNAL MEDICINE

## 2021-04-16 PROCEDURE — G8428 CUR MEDS NOT DOCUMENT: HCPCS | Performed by: INTERNAL MEDICINE

## 2021-04-16 PROCEDURE — 1090F PRES/ABSN URINE INCON ASSESS: CPT | Performed by: INTERNAL MEDICINE

## 2021-04-16 ASSESSMENT — ENCOUNTER SYMPTOMS
NAUSEA: 0
BLOOD IN STOOL: 0
EYES NEGATIVE: 1
WHEEZING: 0
CHEST TIGHTNESS: 0
SHORTNESS OF BREATH: 0
STRIDOR: 0
RESPIRATORY NEGATIVE: 1
GASTROINTESTINAL NEGATIVE: 1
COUGH: 0

## 2021-04-16 NOTE — PROGRESS NOTES
Subsequent Progress Note  Patient: Zander Mitchell  YOB: 1945  MRN: [id number]    Chief Complaint: htn HF SOB DM HOCM  Chief Complaint   Patient presents with    Coronary Artery Disease       CV Data:  3/2019 Echo EF 79% no KYLE   10/8/2019 Cath CX 20-30 LVEF 95%   10/2020 Echo EF 55 Moderate CLVH  10/5/2020 Cath LAD 50-60     Subjective/HPI: she stopped Verapamil 3 days ago due to severe fatigue and weakness. Feels better now. No cp no sob no bleed. C/o nocturnal leg cramps    3/27/2020 Patient and/or health care decision maker is aware that that he may receive a bill for this telephone service, depending on his insurance coverage, and has provided verbal consent to proceed. This visit was completed via telephone. Time spent on the phone with patient 18 minutes. She was to f/u with Dr. Luiz Stokes but was on my VV schedule today. No CP no SOB. Still has occasional night time leg cramps. She is walking and compliant with meds. No LE edema. 1/15/21 recent multiple hospitalization. She was intubated and in shock. Had TVP for Bradycardia but stabilized and did not require PPM.      3/11/21 TELEHEALTH EVALUATION -- Audio/Visual (During [id number] public health emergency)    No cp no sob no falls no bleed. Not walking much. Poor appetite.  /104. .. ran out of Verapamil and Clonidine. 3/18/21 Patient and/or health care decision maker is aware that that he/she may receive a bill for this telephone service, depending on his insurance coverage, and has provided verbal consent to proceed. This visit was completed via telephone. Total time 14 minutes. Had been doing well. This AM she took extra full dose Torsemde 50 mg because she thought she was not uriniating enough.  called in due to her BP being 79/56. She is awake and alert sitting. No cp no sob.  Just feels weak and tired     4/16/21 TELEHEALTH EVALUATION -- Audio/Visual (During ETPSV-78 public health GASTROINTESTINAL ENDOSCOPY N/A 2020    EGD possible biopsy performed by Katie Mari MD at [address] N/A 2020    EGD PUSH ENTEROSCOPY performed by Belinda Ann MD at [facility]       Family History   Problem Relation Age of Onset    Heart Disease Father         cardiac bypass    Arthritis Father     Arthritis Mother     Other Mother          at age 80    Other Sister         [facility]    No Known Problems Daughter     Stroke Son        Social History     Socioeconomic History    Marital status:      Spouse name: Teodoro Nair Number of children: 2    Years of education: 15    Highest education level: High school graduate   Occupational History    Occupation: Retired-   Social Needs    Financial resource strain: Not hard at all   Dennys-Worlds insecurity     Worry: Never true     Inability: Never true    Transportation needs     Medical: No     Non-medical: No   Tobacco Use    Smoking status: Former Smoker     Packs/day: 1.00     Years: 59.00     Pack years: 59.00     Types: Cigarettes     Start date: 2017    Smokeless tobacco: Never Used    Tobacco comment: quit 2-3 weeks ago    Substance and Sexual Activity    Alcohol use: Not Currently    Drug use: No    Sexual activity: Yes     Partners: Male   Lifestyle    Physical activity     Days per week: 0 days     Minutes per session: 0 min    Stress:  Only a little   Relationships    Social connections     Talks on phone: More than three times a week     Gets together: More than three times a week     Attends Sabianism service: 1 to 4 times per year     Active member of club or organization: No     Attends meetings of clubs or organizations: Never     Relationship status: Living with partner    Intimate partner violence     Fear of current or ex partner: No     Emotionally abused: No     Physically abused: No     Forced sexual activity: No   Other Topics Concern  Not on file   Social History Narrative    Grew up in [place]     Lives With:  [id number] Spouse    Type of Home: House    Home Layout: Two level, Performs ADL's on one level    Home Access: Stairs to enter with rails    Entrance Stairs - Number of Steps: 4    Bathroom Shower/Tub: Tub/Shower unit, Doors    Bathroom Equipment: Shower chair, Grab bars in [facility]: Rolling walker, Cane, Oxygen(uses her O2 very seldom)    ADL Assistance: Needs assistance    Homemaking Assistance: Needs assistance    Homemaking Responsibilities: No(spouse performs)    Ambulation Assistance: Independent    Transfer Assistance: Independent    Active : No    Occupation: Retired    Type of occupation: worked in [employer]: patient enjoys [employer]       Allergies   Allergen Reactions    Ibuprofen Nausea Only    Metformin And Related      Diarrhea      Darvon [Propoxyphene Hcl] Nausea And Vomiting       Current Outpatient Medications   Medication Sig Dispense Refill    carvedilol (COREG) 25 MG tablet Take 2 tablets by mouth 2 times daily 120 tablet 3    aspirin 81 MG EC tablet Take 1 tablet by mouth daily 30 tablet 3    levETIRAcetam (KEPPRA) 500 MG tablet Take 1 tablet by mouth 2 times daily 60 tablet 3    insulin lispro (HUMALOG) 100 UNIT/ML injection vial 18 units at each mels 1 vial 3    insulin glargine (LANTUS) 100 UNIT/ML injection vial 60 units at bedtime 3 vial 3    verapamil (CALAN SR) 120 MG extended release tablet Take 1 tablet by mouth daily 90 tablet 3    cloNIDine (CATAPRES) 0.2 MG tablet Take 1 tablet by mouth 2 times daily 180 tablet 3    rosuvastatin (CRESTOR) 40 MG tablet TAKE 1 TABLET BY MOUTH ONCE DAILY IN THE EVENING 90 tablet 0    PARoxetine (PAXIL) 40 MG tablet TAKE 1 TABLET BY MOUTH ONCE DAILY IN THE MORNING 90 tablet 1    ACCU-CHEK PHYLLIS PLUS strip Test 3x daily Dx E11.65 100 each 3    Accu-Chek Softclix Lancets MISC bid 100 each 3    Blood Glucose Monitoring Suppl (ACCU-CHEK PHYLLIS CONNECT) w/Device KIT 1 kit by Does not apply route daily 1 kit 0    blood glucose test strips (ACCU-CHEK PHYLLIS PLUS) strip Patient test 3x daily E65.11 100 each 3    ipratropium-albuterol (DUONEB) 0.5-2.5 (3) MG/3ML SOLN nebulizer solution Inhale 3 mLs into the lungs 3 times daily 360 mL 0    Continuous Blood Gluc Sensor (FREESTYLE MURALI 14 DAY SENSOR) [facility] Use every 2 weeks 2 each 1    Continuous Blood Gluc  (FREESTYLE MURALI 14 DAY READER) [place] As directed 1 Device 0    pantoprazole (PROTONIX) 40 MG tablet Take 40mg twice daily (1st dose 30min before breakfast) for 30 days. After 30 days, you make take 40mg once daily before breakfast. 60 tablet 3    dicyclomine (BENTYL) 10 MG capsule Take 1 capsule by mouth 4 times daily as needed (abdominal pain) 120 capsule 3    ondansetron (ZOFRAN) 4 MG tablet Take 1 tablet by mouth every 8 hours as needed for Nausea or Vomiting 30 tablet 2     No current facility-administered medications for this visit. Review of Systems:   Review of Systems   Constitutional: Negative. Negative for diaphoresis and fatigue. HENT: Negative. Eyes: Negative. Respiratory: Negative. Negative for cough, chest tightness, shortness of breath, wheezing and stridor. Cardiovascular: Negative. Negative for chest pain, palpitations and leg swelling. Gastrointestinal: Negative. Negative for blood in stool and nausea. Genitourinary: Negative. Musculoskeletal: Negative. Skin: Negative. Neurological: Negative. Negative for dizziness, syncope, weakness and light-headedness. Hematological: Negative. Psychiatric/Behavioral: Negative.           Physical Examination:    LMP  (LMP Unknown)    Physical Exam    LABS:  CBC:   Lab Results   Component Value Date    WBC 6.3 04/09/2021    RBC 3.19 04/09/2021    HGB 9.1 04/09/2021    HCT 28.7 04/09/2021    MCV 90.0 04/09/2021    MCH 28.6 04/09/2021    MCHC 31.8 04/09/2021    RDW 16.9 04/09/2021 medication use-[place] - 12/20/17 [facility] PM&R, 02/20/18 [facility] PM&R, 03/07/18 [facility] PM&R, Urine Drug screen negative 02/06/17 PM&R--MED CONTRACT 2/6/17    SOB (shortness of breath) on exertion    Chest pain    Memory deficit    Artificial lens present    Presbyopia    Astigmatism, regular    Cataract, nuclear sclerotic senile    IDDM (insulin dependent diabetes mellitus)    Regular astigmatism    Nuclear senile cataract    Neck pain    Lumbosacral radiculopathy at L5    Spinal stenosis of lumbar region with neurogenic claudication    Impaired mobility and activities of daily living    Non-compliant patient NO showed FU 1/10/17 Dr Lola Dickey Insomnia secondary to chronic pain    Reactive depression    Diabetic radiculopathy (HCC)    Cervical radicular pain    Diabetic asymmetric polyneuropathy (HCC)    Myalgia    Intercostal neuropathy    Osteoarthritis of spine with radiculopathy, lumbar region    Acute combined systolic and diastolic CHF, NYHA class 1 (HCC)    PMB (postmenopausal bleeding)    Acute on chronic diastolic heart failure (HCC)    CHF (congestive heart failure) (HCC)    Hypertensive urgency    Uncontrolled type 2 diabetes mellitus with hyperglycemia (HCC)    Chronic obstructive pulmonary disease with acute exacerbation (HCC)    Acute on chronic diastolic (congestive) heart failure (HCC)    SAÚL (acute kidney injury) ([address]    Hypoglycemia    HOCM (hypertrophic obstructive cardiomyopathy) (HCC)    Gastrointestinal hemorrhage    COPD exacerbation (HCC)    Anemia    AVM (arteriovenous malformation)    Symptomatic anemia    Flash pulmonary edema (HCC)    Acute on chronic kidney failure (HCC)    Dysarthria    Chronic fatigue    Encephalopathy acute    Adenomatous polyp of sigmoid colon    Adenomatous polyp of transverse colon    Sepsis (HCC)    Major depressive disorder in remission ([address]    Gastroesophageal reflux disease without esophagitis    Acute cystitis without hematuria    CKD (chronic kidney disease) stage 4, GFR 15-29 ml/min ([facility])    Cardiopulmonary arrest ([facility])    Gait abnormality    Late effects of CVA (cerebrovascular accident)    Cardiac arrest [facility]    Acute respiratory failure with hypoxia ([facility])    Goals of care, counseling/discussion    Bradycardia    Moderate hypoxic-ischemic encephalopathy    Infection due to ESBL-producing Escherichia coli       There are no discontinued medications. Modified Medications    No medications on file       No orders of the defined types were placed in this encounter. Assessment/Plan:    1. Acute combined systolic and diastolic CHF, NYHA class 1 ([facility])   compensated   - Basic Metabolic Panel; Future    2. Essential hypertension   not controlled- refills of Verapamil and Clonidine refilled    3. Mixed hyperlipidemia     4. HOCM (hypertrophic obstructive cardiomyopathy) ([facility])  Continue BB ACEI. And Dig.     5. Hypotension- stable now    6. Resp failure- stable. Need Therapy. Need to walk daily. Eat well. Take meds. F/u with all doc. RTO 6 weeks. Counseling:  Heart Healthy Lifestyle, Low Salt Diet, Take Precautions to Prevent Falls and Walk Daily    Return in about 6 weeks (around 5/28/2021). Pursuant to the emergency declaration under the [address] [place] [address], Critical access hospital5 waiver authority and the [employer] Act, this Virtual Visit was conducted, with patient's consent, to reduce the patient's risk of exposure to COVID-19 and provide continuity of care for an established patient. Services were provided through a video synchronous discussion virtually to substitute for in-person clinic visit. Visit completed using [employer] me. Patient was located at home and I was located in the clinic.   Electronically signed by Jose Lozano MD on 4/16/2021 at 1:23 PM

## 2021-04-16 NOTE — CARE COORDINATION
[facility] Transitions Follow Up Call    2021    Patient: Andrey Loo  Patient : 1945   MRN: [id number]  Reason for Admission: Cardiopulmonary arrest  Discharge Date: 21 RARS: Readmission Risk Score: 44  CT     Second subsequent outreach. Left Hippa complaint VM. CTN s/o. Care Transitions Subsequent and Final Call    Subsequent and Final Calls  Care Transitions Interventions  Other Interventions:            Follow Up  Future Appointments   Date Time Provider Zaid Lr   2021 11:00 AM JOHNATHON Stephenson - CNP PC [facility] PH[facility]   5/3/2021  1:00 PM Patricia Laguerre MD [facility]   2021  3:30 PM Concepción Prather, [address] [place] [address]   2021  2:30 PM Chauncey Gonsalves MD [address]

## 2021-04-23 DIAGNOSIS — D64.9 ANEMIA, UNSPECIFIED TYPE: Primary | ICD-10-CM

## 2021-04-23 DIAGNOSIS — Z87.440 HISTORY OF UTI: ICD-10-CM

## 2021-04-23 NOTE — TELEPHONE ENCOUNTER
[facility] is asking for a repeat CBC and a Urinalysis to make sure all is going in the right direction. Mike Casper is asking you to fax the orders to [phone], please.

## 2021-04-27 ENCOUNTER — TELEPHONE (OUTPATIENT)
Dept: FAMILY MEDICINE CLINIC | Age: 76
End: 2021-04-27

## 2021-04-27 LAB
ANION GAP SERPL CALCULATED.3IONS-SCNC: 12 MEQ/L (ref 9–15)
BACTERIA: ABNORMAL /HPF
BASOPHILS ABSOLUTE: 0 K/UL (ref 0–0.2)
BASOPHILS RELATIVE PERCENT: 0.6 %
BILIRUBIN URINE: NEGATIVE
BLOOD, URINE: NEGATIVE
BUN BLDV-MCNC: 28 MG/DL (ref 8–23)
CALCIUM SERPL-MCNC: 9.9 MG/DL (ref 8.5–9.9)
CHLORIDE BLD-SCNC: 106 MEQ/L (ref 95–107)
CLARITY: ABNORMAL
CO2: 21 MEQ/L (ref 20–31)
COLOR: YELLOW
CREAT SERPL-MCNC: 1.83 MG/DL (ref 0.5–0.9)
EOSINOPHILS ABSOLUTE: 0.3 K/UL (ref 0–0.7)
EOSINOPHILS RELATIVE PERCENT: 4.1 %
EPITHELIAL CELLS, UA: ABNORMAL /HPF
GFR AFRICAN AMERICAN: 32.4
GFR NON-AFRICAN AMERICAN: 26.8
GLUCOSE BLD-MCNC: 255 MG/DL (ref 70–99)
GLUCOSE URINE: 250 MG/DL
HCT VFR BLD CALC: 23.6 % (ref 37–47)
HEMOGLOBIN: 7.5 G/DL (ref 12–16)
KETONES, URINE: NEGATIVE MG/DL
LEUKOCYTE ESTERASE, URINE: NEGATIVE
LYMPHOCYTES ABSOLUTE: 1.5 K/UL (ref 1–4.8)
LYMPHOCYTES RELATIVE PERCENT: 20.3 %
MCH RBC QN AUTO: 27.7 PG (ref 27–31.3)
MCHC RBC AUTO-ENTMCNC: 31.9 % (ref 33–37)
MCV RBC AUTO: 86.6 FL (ref 82–100)
MONOCYTES ABSOLUTE: 0.7 K/UL (ref 0.2–0.8)
MONOCYTES RELATIVE PERCENT: 9.4 %
NEUTROPHILS ABSOLUTE: 4.8 K/UL (ref 1.4–6.5)
NEUTROPHILS RELATIVE PERCENT: 65.6 %
NITRITE, URINE: NEGATIVE
PDW BLD-RTO: 17.6 % (ref 11.5–14.5)
PH UA: 5.5 (ref 5–9)
PLATELET # BLD: 491 K/UL (ref 130–400)
POTASSIUM SERPL-SCNC: 5.2 MEQ/L (ref 3.4–4.9)
PROTEIN UA: 30 MG/DL
RBC # BLD: 2.72 M/UL (ref 4.2–5.4)
RBC UA: ABNORMAL /HPF (ref 0–2)
SODIUM BLD-SCNC: 139 MEQ/L (ref 135–144)
SPECIFIC GRAVITY UA: 1.02 (ref 1–1.03)
UROBILINOGEN, URINE: 0.2 E.U./DL
WBC # BLD: 7.3 K/UL (ref 4.8–10.8)
WBC UA: ABNORMAL /HPF (ref 0–5)

## 2021-04-27 NOTE — TELEPHONE ENCOUNTER
[facility] calling in to request for the Dr to put new orders for labs in. After checking, I informed that lab orders were in.

## 2021-04-28 ENCOUNTER — TELEPHONE (OUTPATIENT)
Dept: FAMILY MEDICINE CLINIC | Age: 76
End: 2021-04-28

## 2021-04-28 NOTE — TELEPHONE ENCOUNTER
Hematology/Oncology department is calling in stating that the patient had lab work completed yesterday and the results are critical. Requesting that I fax the results so the patient can get a transfusion. Patient's home nurse also called in stating that the family is very concerned about the patient. They were stating that the last time her lab results came back this low she went into cardiac arrest. Requesting that Dr. Reanna Munguia be advised. Spoke with Dr. Reanna Munguia and he reviewed the patient's lab work. Patient has multiple specialists in her care [place]. States that the blood transfusion will help the patient get her blood count back up. Blood work results were faxed to Hematology/Oncology at [phone]. Do not need a response, just advising of what is going on.

## 2021-04-29 ENCOUNTER — APPOINTMENT (OUTPATIENT)
Dept: GENERAL RADIOLOGY | Age: 76
DRG: 378 | End: 2021-04-29
Payer: MEDICARE

## 2021-04-29 ENCOUNTER — TELEPHONE (OUTPATIENT)
Dept: OTHER | Facility: CLINIC | Age: 76
End: 2021-04-29

## 2021-04-29 ENCOUNTER — HOSPITAL ENCOUNTER (INPATIENT)
Age: 76
LOS: 1 days | Discharge: HOME | DRG: 378 | End: 2021-04-30
Attending: EMERGENCY MEDICINE | Admitting: INTERNAL MEDICINE
Payer: MEDICARE

## 2021-04-29 ENCOUNTER — TELEPHONE (OUTPATIENT)
Dept: FAMILY MEDICINE CLINIC | Age: 76
End: 2021-04-29

## 2021-04-29 ENCOUNTER — APPOINTMENT (OUTPATIENT)
Dept: INTERVENTIONAL RADIOLOGY/VASCULAR | Age: 76
DRG: 378 | End: 2021-04-29
Payer: MEDICARE

## 2021-04-29 DIAGNOSIS — D50.9 IRON DEFICIENCY ANEMIA, UNSPECIFIED IRON DEFICIENCY ANEMIA TYPE: ICD-10-CM

## 2021-04-29 DIAGNOSIS — K92.2 GASTROINTESTINAL HEMORRHAGE, UNSPECIFIED GASTROINTESTINAL HEMORRHAGE TYPE: Primary | ICD-10-CM

## 2021-04-29 LAB
ABO/RH: NORMAL
ALBUMIN SERPL-MCNC: 3.5 G/DL (ref 3.5–4.6)
ALP BLD-CCNC: 118 U/L (ref 40–130)
ALT SERPL-CCNC: 12 U/L (ref 0–33)
ANION GAP SERPL CALCULATED.3IONS-SCNC: 12 MEQ/L (ref 9–15)
ANTIBODY SCREEN: NORMAL
APTT: 32.7 SEC (ref 24.4–36.8)
AST SERPL-CCNC: 20 U/L (ref 0–35)
BACTERIA: ABNORMAL /HPF
BASOPHILS ABSOLUTE: 0.1 K/UL (ref 0–0.2)
BASOPHILS RELATIVE PERCENT: 1.1 %
BILIRUB SERPL-MCNC: <0.2 MG/DL (ref 0.2–0.7)
BILIRUBIN URINE: NEGATIVE
BLOOD, URINE: ABNORMAL
BUN BLDV-MCNC: 29 MG/DL (ref 8–23)
CALCIUM SERPL-MCNC: 8.7 MG/DL (ref 8.5–9.9)
CHLORIDE BLD-SCNC: 106 MEQ/L (ref 95–107)
CLARITY: ABNORMAL
CO2: 20 MEQ/L (ref 20–31)
COLOR: YELLOW
CREAT SERPL-MCNC: 1.76 MG/DL (ref 0.5–0.9)
EKG ATRIAL RATE: 78 BPM
EKG P AXIS: 52 DEGREES
EKG P-R INTERVAL: 152 MS
EKG Q-T INTERVAL: 366 MS
EKG QRS DURATION: 78 MS
EKG QTC CALCULATION (BAZETT): 417 MS
EKG R AXIS: 28 DEGREES
EKG T AXIS: 127 DEGREES
EKG VENTRICULAR RATE: 78 BPM
EOSINOPHILS ABSOLUTE: 0.3 K/UL (ref 0–0.7)
EOSINOPHILS RELATIVE PERCENT: 4 %
EPITHELIAL CELLS, UA: ABNORMAL /HPF (ref 0–5)
GFR AFRICAN AMERICAN: 33.9
GFR NON-AFRICAN AMERICAN: 28
GLOBULIN: 2.7 G/DL (ref 2.3–3.5)
GLUCOSE BLD-MCNC: 111 MG/DL (ref 60–115)
GLUCOSE BLD-MCNC: 143 MG/DL (ref 70–99)
GLUCOSE BLD-MCNC: 263 MG/DL (ref 60–115)
GLUCOSE URINE: NEGATIVE MG/DL
HBA1C MFR BLD: 7.5 % (ref 4.8–5.9)
HCT VFR BLD CALC: 23.5 % (ref 37–47)
HCT VFR BLD CALC: 25.1 % (ref 37–47)
HEMOGLOBIN: 7.5 G/DL (ref 12–16)
HEMOGLOBIN: 7.8 G/DL (ref 12–16)
HYALINE CASTS: ABNORMAL /HPF (ref 0–5)
INR BLD: 1.1
KETONES, URINE: NEGATIVE MG/DL
LEUKOCYTE ESTERASE, URINE: NEGATIVE
LYMPHOCYTES ABSOLUTE: 1.3 K/UL (ref 1–4.8)
LYMPHOCYTES RELATIVE PERCENT: 20.7 %
MCH RBC QN AUTO: 27.3 PG (ref 27–31.3)
MCHC RBC AUTO-ENTMCNC: 31.3 % (ref 33–37)
MCV RBC AUTO: 87.1 FL (ref 82–100)
MONOCYTES ABSOLUTE: 0.6 K/UL (ref 0.2–0.8)
MONOCYTES RELATIVE PERCENT: 10.2 %
NEUTROPHILS ABSOLUTE: 4 K/UL (ref 1.4–6.5)
NEUTROPHILS RELATIVE PERCENT: 64 %
NITRITE, URINE: NEGATIVE
PDW BLD-RTO: 18.2 % (ref 11.5–14.5)
PERFORMED ON: ABNORMAL
PERFORMED ON: NORMAL
PH UA: 5.5 (ref 5–9)
PLATELET # BLD: 543 K/UL (ref 130–400)
POTASSIUM SERPL-SCNC: 4.9 MEQ/L (ref 3.4–4.9)
PROTEIN UA: 30 MG/DL
PROTHROMBIN TIME: 14.4 SEC (ref 12.3–14.9)
RBC # BLD: 2.88 M/UL (ref 4.2–5.4)
RBC UA: ABNORMAL /HPF (ref 0–5)
SARS-COV-2, NAAT: NOT DETECTED
SODIUM BLD-SCNC: 138 MEQ/L (ref 135–144)
SPECIFIC GRAVITY UA: 1.01 (ref 1–1.03)
TOTAL CK: 161 U/L (ref 0–170)
TOTAL PROTEIN: 6.2 G/DL (ref 6.3–8)
TROPONIN: 0.05 NG/ML (ref 0–0.01)
URINE REFLEX TO CULTURE: ABNORMAL
UROBILINOGEN, URINE: 0.2 E.U./DL
WBC # BLD: 6.3 K/UL (ref 4.8–10.8)
WBC UA: ABNORMAL /HPF (ref 0–5)

## 2021-04-29 PROCEDURE — 86901 BLOOD TYPING SEROLOGIC RH(D): CPT

## 2021-04-29 PROCEDURE — 6360000002 HC RX W HCPCS: Performed by: EMERGENCY MEDICINE

## 2021-04-29 PROCEDURE — 6360000002 HC RX W HCPCS: Performed by: NURSE PRACTITIONER

## 2021-04-29 PROCEDURE — 96367 TX/PROPH/DG ADDL SEQ IV INF: CPT

## 2021-04-29 PROCEDURE — 93010 ELECTROCARDIOGRAM REPORT: CPT | Performed by: INTERNAL MEDICINE

## 2021-04-29 PROCEDURE — 2580000003 HC RX 258: Performed by: NURSE PRACTITIONER

## 2021-04-29 PROCEDURE — 1210000000 HC MED SURG R&B

## 2021-04-29 PROCEDURE — C9113 INJ PANTOPRAZOLE SODIUM, VIA: HCPCS | Performed by: EMERGENCY MEDICINE

## 2021-04-29 PROCEDURE — 96376 TX/PRO/DX INJ SAME DRUG ADON: CPT

## 2021-04-29 PROCEDURE — C9113 INJ PANTOPRAZOLE SODIUM, VIA: HCPCS | Performed by: NURSE PRACTITIONER

## 2021-04-29 PROCEDURE — 2709999900 IR PICC WO SQ PORT/PUMP > 5 YEARS

## 2021-04-29 PROCEDURE — 94761 N-INVAS EAR/PLS OXIMETRY MLT: CPT

## 2021-04-29 PROCEDURE — 96374 THER/PROPH/DIAG INJ IV PUSH: CPT

## 2021-04-29 PROCEDURE — 71045 X-RAY EXAM CHEST 1 VIEW: CPT

## 2021-04-29 PROCEDURE — 84484 ASSAY OF TROPONIN QUANT: CPT

## 2021-04-29 PROCEDURE — 85014 HEMATOCRIT: CPT

## 2021-04-29 PROCEDURE — 83036 HEMOGLOBIN GLYCOSYLATED A1C: CPT

## 2021-04-29 PROCEDURE — G0378 HOSPITAL OBSERVATION PER HR: HCPCS

## 2021-04-29 PROCEDURE — 2580000003 HC RX 258: Performed by: EMERGENCY MEDICINE

## 2021-04-29 PROCEDURE — 80053 COMPREHEN METABOLIC PANEL: CPT

## 2021-04-29 PROCEDURE — 82550 ASSAY OF CK (CPK): CPT

## 2021-04-29 PROCEDURE — 96366 THER/PROPH/DIAG IV INF ADDON: CPT

## 2021-04-29 PROCEDURE — 6370000000 HC RX 637 (ALT 250 FOR IP): Performed by: INTERNAL MEDICINE

## 2021-04-29 PROCEDURE — 36430 TRANSFUSION BLD/BLD COMPNT: CPT

## 2021-04-29 PROCEDURE — 2580000003 HC RX 258: Performed by: INTERNAL MEDICINE

## 2021-04-29 PROCEDURE — 86850 RBC ANTIBODY SCREEN: CPT

## 2021-04-29 PROCEDURE — 87635 SARS-COV-2 COVID-19 AMP PRB: CPT

## 2021-04-29 PROCEDURE — 85730 THROMBOPLASTIN TIME PARTIAL: CPT

## 2021-04-29 PROCEDURE — 86923 COMPATIBILITY TEST ELECTRIC: CPT

## 2021-04-29 PROCEDURE — 36415 COLL VENOUS BLD VENIPUNCTURE: CPT

## 2021-04-29 PROCEDURE — 96365 THER/PROPH/DIAG IV INF INIT: CPT

## 2021-04-29 PROCEDURE — 6370000000 HC RX 637 (ALT 250 FOR IP): Performed by: NURSE PRACTITIONER

## 2021-04-29 PROCEDURE — 81001 URINALYSIS AUTO W/SCOPE: CPT

## 2021-04-29 PROCEDURE — 85610 PROTHROMBIN TIME: CPT

## 2021-04-29 PROCEDURE — 93005 ELECTROCARDIOGRAM TRACING: CPT | Performed by: EMERGENCY MEDICINE

## 2021-04-29 PROCEDURE — 02HV33Z INSERTION OF INFUSION DEVICE INTO SUPERIOR VENA CAVA, PERCUTANEOUS APPROACH: ICD-10-PCS | Performed by: RADIOLOGY

## 2021-04-29 PROCEDURE — 94664 DEMO&/EVAL PT USE INHALER: CPT

## 2021-04-29 PROCEDURE — 86900 BLOOD TYPING SEROLOGIC ABO: CPT

## 2021-04-29 PROCEDURE — 94640 AIRWAY INHALATION TREATMENT: CPT

## 2021-04-29 PROCEDURE — 36573 INSJ PICC RS&I 5 YR+: CPT

## 2021-04-29 PROCEDURE — 2500000003 HC RX 250 WO HCPCS: Performed by: INTERNAL MEDICINE

## 2021-04-29 PROCEDURE — 96375 TX/PRO/DX INJ NEW DRUG ADDON: CPT

## 2021-04-29 PROCEDURE — 99285 EMERGENCY DEPT VISIT HI MDM: CPT

## 2021-04-29 PROCEDURE — 85018 HEMOGLOBIN: CPT

## 2021-04-29 PROCEDURE — 85025 COMPLETE CBC W/AUTO DIFF WBC: CPT

## 2021-04-29 PROCEDURE — P9016 RBC LEUKOCYTES REDUCED: HCPCS

## 2021-04-29 RX ORDER — IPRATROPIUM BROMIDE AND ALBUTEROL SULFATE 2.5; .5 MG/3ML; MG/3ML
3 SOLUTION RESPIRATORY (INHALATION) 3 TIMES DAILY
Status: DISCONTINUED | OUTPATIENT
Start: 2021-04-29 | End: 2021-04-30 | Stop reason: HOSPADM

## 2021-04-29 RX ORDER — CARVEDILOL 25 MG/1
25 TABLET ORAL 2 TIMES DAILY
Status: DISCONTINUED | OUTPATIENT
Start: 2021-04-29 | End: 2021-04-30 | Stop reason: HOSPADM

## 2021-04-29 RX ORDER — DEXTROSE MONOHYDRATE 25 G/50ML
12.5 INJECTION, SOLUTION INTRAVENOUS PRN
Status: DISCONTINUED | OUTPATIENT
Start: 2021-04-29 | End: 2021-04-30 | Stop reason: HOSPADM

## 2021-04-29 RX ORDER — ONDANSETRON 2 MG/ML
4 INJECTION INTRAMUSCULAR; INTRAVENOUS EVERY 6 HOURS PRN
Status: DISCONTINUED | OUTPATIENT
Start: 2021-04-29 | End: 2021-04-30 | Stop reason: HOSPADM

## 2021-04-29 RX ORDER — LIDOCAINE HYDROCHLORIDE 20 MG/ML
5 INJECTION, SOLUTION INFILTRATION; PERINEURAL ONCE
Status: COMPLETED | OUTPATIENT
Start: 2021-04-29 | End: 2021-04-29

## 2021-04-29 RX ORDER — NICOTINE POLACRILEX 4 MG
15 LOZENGE BUCCAL PRN
Status: DISCONTINUED | OUTPATIENT
Start: 2021-04-29 | End: 2021-04-30 | Stop reason: HOSPADM

## 2021-04-29 RX ORDER — SODIUM CHLORIDE 0.9 % (FLUSH) 0.9 %
5-40 SYRINGE (ML) INJECTION PRN
Status: DISCONTINUED | OUTPATIENT
Start: 2021-04-29 | End: 2021-04-30 | Stop reason: HOSPADM

## 2021-04-29 RX ORDER — SODIUM CHLORIDE 0.9 % (FLUSH) 0.9 %
5-40 SYRINGE (ML) INJECTION EVERY 12 HOURS SCHEDULED
Status: DISCONTINUED | OUTPATIENT
Start: 2021-04-29 | End: 2021-04-30 | Stop reason: HOSPADM

## 2021-04-29 RX ORDER — LEVETIRACETAM 500 MG/1
500 TABLET ORAL 2 TIMES DAILY
Status: DISCONTINUED | OUTPATIENT
Start: 2021-04-29 | End: 2021-04-30 | Stop reason: HOSPADM

## 2021-04-29 RX ORDER — SODIUM CHLORIDE 9 MG/ML
250 INJECTION, SOLUTION INTRAVENOUS ONCE
Status: COMPLETED | OUTPATIENT
Start: 2021-04-29 | End: 2021-04-29

## 2021-04-29 RX ORDER — ROSUVASTATIN CALCIUM 5 MG/1
20 TABLET, COATED ORAL NIGHTLY
Status: DISCONTINUED | OUTPATIENT
Start: 2021-04-29 | End: 2021-04-30 | Stop reason: HOSPADM

## 2021-04-29 RX ORDER — SODIUM CHLORIDE 9 MG/ML
INJECTION, SOLUTION INTRAVENOUS PRN
Status: DISCONTINUED | OUTPATIENT
Start: 2021-04-29 | End: 2021-04-30 | Stop reason: HOSPADM

## 2021-04-29 RX ORDER — VERAPAMIL HYDROCHLORIDE 240 MG/1
120 TABLET, FILM COATED, EXTENDED RELEASE ORAL DAILY
Status: DISCONTINUED | OUTPATIENT
Start: 2021-04-29 | End: 2021-04-30 | Stop reason: HOSPADM

## 2021-04-29 RX ORDER — DEXTROSE MONOHYDRATE 50 MG/ML
100 INJECTION, SOLUTION INTRAVENOUS PRN
Status: DISCONTINUED | OUTPATIENT
Start: 2021-04-29 | End: 2021-04-30 | Stop reason: HOSPADM

## 2021-04-29 RX ORDER — ACETAMINOPHEN 325 MG/1
650 TABLET ORAL EVERY 6 HOURS PRN
Status: DISCONTINUED | OUTPATIENT
Start: 2021-04-29 | End: 2021-04-30 | Stop reason: HOSPADM

## 2021-04-29 RX ORDER — PAROXETINE HYDROCHLORIDE 20 MG/1
40 TABLET, FILM COATED ORAL DAILY
Status: DISCONTINUED | OUTPATIENT
Start: 2021-04-29 | End: 2021-04-30 | Stop reason: HOSPADM

## 2021-04-29 RX ORDER — INSULIN GLARGINE 100 [IU]/ML
60 INJECTION, SOLUTION SUBCUTANEOUS NIGHTLY
Status: DISCONTINUED | OUTPATIENT
Start: 2021-04-29 | End: 2021-04-30 | Stop reason: HOSPADM

## 2021-04-29 RX ORDER — ASPIRIN 81 MG/1
81 TABLET ORAL DAILY
Status: DISCONTINUED | OUTPATIENT
Start: 2021-04-29 | End: 2021-04-30 | Stop reason: HOSPADM

## 2021-04-29 RX ORDER — ONDANSETRON 4 MG/1
4 TABLET, FILM COATED ORAL EVERY 8 HOURS PRN
Status: DISCONTINUED | OUTPATIENT
Start: 2021-04-29 | End: 2021-04-30 | Stop reason: HOSPADM

## 2021-04-29 RX ORDER — SODIUM CHLORIDE 9 MG/ML
25 INJECTION, SOLUTION INTRAVENOUS PRN
Status: DISCONTINUED | OUTPATIENT
Start: 2021-04-29 | End: 2021-04-30 | Stop reason: HOSPADM

## 2021-04-29 RX ORDER — ALBUTEROL SULFATE 2.5 MG/3ML
2.5 SOLUTION RESPIRATORY (INHALATION)
Status: DISCONTINUED | OUTPATIENT
Start: 2021-04-29 | End: 2021-04-30 | Stop reason: HOSPADM

## 2021-04-29 RX ORDER — PROMETHAZINE HYDROCHLORIDE 12.5 MG/1
12.5 TABLET ORAL EVERY 6 HOURS PRN
Status: DISCONTINUED | OUTPATIENT
Start: 2021-04-29 | End: 2021-04-30 | Stop reason: HOSPADM

## 2021-04-29 RX ORDER — PANTOPRAZOLE SODIUM 40 MG/1
40 TABLET, DELAYED RELEASE ORAL
Status: DISCONTINUED | OUTPATIENT
Start: 2021-04-30 | End: 2021-04-30 | Stop reason: HOSPADM

## 2021-04-29 RX ORDER — SODIUM CHLORIDE 9 MG/ML
INJECTION, SOLUTION INTRAVENOUS CONTINUOUS
Status: DISPENSED | OUTPATIENT
Start: 2021-04-29 | End: 2021-04-30

## 2021-04-29 RX ORDER — ACETAMINOPHEN 650 MG/1
650 SUPPOSITORY RECTAL EVERY 6 HOURS PRN
Status: DISCONTINUED | OUTPATIENT
Start: 2021-04-29 | End: 2021-04-30 | Stop reason: HOSPADM

## 2021-04-29 RX ADMIN — VERAPAMIL HYDROCHLORIDE 120 MG: 240 TABLET, FILM COATED, EXTENDED RELEASE ORAL at 18:25

## 2021-04-29 RX ADMIN — PANTOPRAZOLE SODIUM 8 MG/HR: 40 INJECTION, POWDER, FOR SOLUTION INTRAVENOUS at 14:37

## 2021-04-29 RX ADMIN — LEVETIRACETAM 500 MG: 500 TABLET ORAL at 21:40

## 2021-04-29 RX ADMIN — LIDOCAINE HYDROCHLORIDE 5 ML: 20 INJECTION, SOLUTION INFILTRATION; PERINEURAL at 16:34

## 2021-04-29 RX ADMIN — SODIUM CHLORIDE: 9 INJECTION, SOLUTION INTRAVENOUS at 14:17

## 2021-04-29 RX ADMIN — SODIUM CHLORIDE 250 ML: 9 INJECTION, SOLUTION INTRAVENOUS at 16:35

## 2021-04-29 RX ADMIN — IPRATROPIUM BROMIDE AND ALBUTEROL SULFATE 3 ML: .5; 3 SOLUTION RESPIRATORY (INHALATION) at 19:27

## 2021-04-29 RX ADMIN — ROSUVASTATIN CALCIUM 20 MG: 5 TABLET, FILM COATED ORAL at 21:51

## 2021-04-29 RX ADMIN — INSULIN GLARGINE 60 UNITS: 100 INJECTION, SOLUTION SUBCUTANEOUS at 21:50

## 2021-04-29 RX ADMIN — ASPIRIN 81 MG: 81 TABLET, COATED ORAL at 18:25

## 2021-04-29 RX ADMIN — IRON SUCROSE 300 MG: 20 INJECTION, SOLUTION INTRAVENOUS at 15:06

## 2021-04-29 RX ADMIN — SODIUM CHLORIDE 40 MG: 9 INJECTION, SOLUTION INTRAVENOUS at 12:32

## 2021-04-29 RX ADMIN — PAROXETINE HYDROCHLORIDE 40 MG: 20 TABLET, FILM COATED ORAL at 18:25

## 2021-04-29 RX ADMIN — INSULIN LISPRO 2 UNITS: 100 INJECTION, SOLUTION INTRAVENOUS; SUBCUTANEOUS at 21:50

## 2021-04-29 RX ADMIN — CARVEDILOL 25 MG: 25 TABLET, FILM COATED ORAL at 21:40

## 2021-04-29 ASSESSMENT — PAIN DESCRIPTION - LOCATION: LOCATION: CHEST

## 2021-04-29 ASSESSMENT — ENCOUNTER SYMPTOMS
VOMITING: 0
SHORTNESS OF BREATH: 0
GASTROINTESTINAL NEGATIVE: 1
SINUS PAIN: 0
ABDOMINAL DISTENTION: 0
ALLERGIC/IMMUNOLOGIC NEGATIVE: 1
NAUSEA: 0
EYE DISCHARGE: 0
EYES NEGATIVE: 1
CONSTIPATION: 0
RHINORRHEA: 0
EYE PAIN: 0
COUGH: 0
ABDOMINAL PAIN: 0
RESPIRATORY NEGATIVE: 1
WHEEZING: 0
DIARRHEA: 0

## 2021-04-29 ASSESSMENT — PAIN SCALES - GENERAL
PAINLEVEL_OUTOF10: 8
PAINLEVEL_OUTOF10: 0

## 2021-04-29 NOTE — CARE COORDINATION
[facility] Case Management Initial Discharge Assessment    Met with Family to discuss discharge plan. PCP: Gil Daniel MD                                Date of Last Visit: virtual visit 4/9/21    If no PCP, list provided? N/A    Discharge Planning    Living Arrangements: at home dependent on family care    Who do you live with? spouse    Who helps you with your care:  Family- has daughter that helps  or spouse    If lives at home:     Do you have any barriers navigating in your home? Yes, needs assist with ADLs    Patient can perform ADL? No- needs assist with bathing, dressing. Current Services (outpatient and in home) :  [address] [employer] [facility] ([address], PT/OT 2xweek)    Dialysis: No    Is transportation available to get to your appointments? Yes    DME Equipment:  yes - cane, walker, WC, hospitalist    Respiratory equipment: Nebulizer    Respiratory provider:  no     Pharmacy:  yes - [address] with Medication Assistance Program?  No      Patient agreeable to [facility]? Yes, [facility] to resume    Patient agreeable to SNF/Rehab? No- not at this time. Other discharge needs identified? N/A    Does Patient Have a High-Risk for Readmission Diagnosis (CHF, PN, MI, COPD)? No        Initial Discharge Plan? (Note: please see concurrent daily documentation for any updates after initial note). Home with family, [facility] to resume.      Readmission Risk              Risk of Unplanned Readmission:        0         Electronically signed by Mo Salcido RN on 4/29/2021 at 12:38 PM

## 2021-04-29 NOTE — ED NOTES
Report called to Inez Number on 2wt. Tele placed on patient.       Dominga Sanches RN  04/29/21 [id number]

## 2021-04-29 NOTE — H&P
Hospitalist History and Physical  Name: Naun Bender  Age: 67 y.o. Gender: female  CodeStatus: DNR-CCA  Allergies: Ibuprofen  Metformin And Related  Darvon [Propoxyphene Hcl]    Chief Complaint:Anemia (sent by PCP)    Primary Care Provider: Darcie Dubin, MD  InpatientTreatment Team: Treatment Team: Attending Provider: Jerry Romo DO; Registered Nurse: Rachael Garcia RN  Admission Date: 4/29/2021      Subjective: Patient is a 69-year-old female with history of COPD, CKD, ACS status post MI 1 month ago, type 2 diabetes, hyperlipidemia, hypertension, anemia and CHF who presented to the emergency room for evaluation of anemia. Patient has home health care who obtain CBC on Tuesday, labs resulted today with a hemoglobin of 7.5 on 4/23 hemoglobin was 9. Patient does have history of recurrent GI bleeds. She has recently had capsule endoscopy and colonoscopy and source of bleeding was not found. Arrangements were made to have transfusion outpatient however due to patient's increased weakness and fatigue,  thought it was best to have her evaluated.  and patient deny any evidence of bleeding, denies melena, hematochezia, hematemesis, hemoptysis, dysuria or hematuria. Patient denies any chest pain, palpitations, abdominal pain, nausea, vomiting, diarrhea or fever. Physical Exam  Constitutional:       Appearance: She is ill-appearing. HENT:      Head: Normocephalic and atraumatic. Right Ear: External ear normal.      Left Ear: External ear normal.      Nose: Nose normal.      Mouth/Throat:      Mouth: Mucous membranes are moist.      Pharynx: Oropharynx is clear. Eyes:      Extraocular Movements: Extraocular movements intact. Conjunctiva/sclera: Conjunctivae normal.      Pupils: Pupils are equal, round, and reactive to light. Neck:      Musculoskeletal: Normal range of motion and neck supple. Cardiovascular:      Rate and Rhythm: Normal rate and regular rhythm.       Pulses: PLUS strip Test 3x daily Dx E11.65 100 each 3    Accu-Chek Softclix Lancets MISC bid 100 each 3    Blood Glucose Monitoring Suppl (ACCU-CHEK PHYLLIS CONNECT) w/Device KIT 1 kit by Does not apply route daily 1 kit 0    blood glucose test strips (ACCU-CHEK PHYLLIS PLUS) strip Patient test 3x daily E65.11 100 each 3    ipratropium-albuterol (DUONEB) 0.5-2.5 (3) MG/3ML SOLN nebulizer solution Inhale 3 mLs into the lungs 3 times daily 360 mL 0    Continuous Blood Gluc Sensor (FREESTYLE MURALI 14 DAY SENSOR) MISC Use every 2 weeks 2 each 1    Continuous Blood Gluc  (FREESTYLE MURALI 14 DAY READER) [place] As directed 1 Device 0    pantoprazole (PROTONIX) 40 MG tablet Take 40mg twice daily (1st dose 30min before breakfast) for 30 days. After 30 days, you make take 40mg once daily before breakfast. 60 tablet 3    dicyclomine (BENTYL) 10 MG capsule Take 1 capsule by mouth 4 times daily as needed (abdominal pain) 120 capsule 3    ondansetron (ZOFRAN) 4 MG tablet Take 1 tablet by mouth every 8 hours as needed for Nausea or Vomiting 30 tablet 2        Past Medical History:   Diagnosis Date    Anxiety     Asthma     dx 2019 / has smoked since age 11    CHF (congestive heart failure) (HCC)     Chronic back pain     Bilateral L5 S1 Radic on emg--surprisingly worse on the left than the right--pt's symptoms and her MRI show worse on the right    Chronic obstructive pulmonary disease with acute exacerbation ([facility] [address] 10/12/2019    Depression     Fibromyalgia     Hyperlipidemia     meds > 8 yrs    Hypertension     meds > 45 yrs    On home O2     2l per n/c at bedtime mostly,     Osteoarthritis     Type II diabetes mellitus, uncontrolled ([address]     hx > 8 yrs    Unspecified sleep apnea        Past Surgical History:   Procedure Laterality Date    BACK SURGERY  2017    lumbar disc    CARDIAC CATHETERIZATION  11/3/14     DR. MIRELES / no stents    COLONOSCOPY  08/29/2016    w/polypectomy     COLONOSCOPY N/A 2020    COLONOSCOPY WITH POLYPECTOMY performed by Salvador Yeh [facility] at [facility] N/A 10/7/2019    EUA HYSTEROSCOPY DILATATION AND CURETTAGE performed by Cary Franz DO at [facility]. [address], COLON, DIAGNOSTIC      EYE SURGERY      [place] with IOL O[address] / [id number]    umbilical hernia repair    TX ESOPHAGOGASTRODUODENOSCOPY TRANSORAL DIAGNOSTIC N/A 3/24/2017    EGD ESOPHAGOGASTRODUODENOSCOPY performed by Kodi Rodriguez MD at [facility] N/A 2018    negative findings    TX REVISE MEDIAN N/CARPAL TUNNEL SURG Left 2017    LEFT  CARPAL TUNNEL RELEASE performed by Tavo Ferguson MD [facility] N/A 2018    TONSILLECTOMY      as child    UPPER GASTROINTESTINAL ENDOSCOPY  2016    w/bx     UPPER GASTROINTESTINAL ENDOSCOPY N/A 2020    EGD possible biopsy performed by Salvador Yeh MD at [address] [place] [address] N/A 2020    EGD PUSH ENTEROSCOPY performed by Patsy Ma MD at [facility]        Family History   Problem Relation Age of Onset    Heart Disease Father         cardiac bypass    Arthritis Father     Arthritis Mother     Other Mother          at age 80    Other Sister         [facility]    No Known Problems Daughter     Stroke Son         Social History     Socioeconomic History    Marital status:      Spouse name: Aster Khalil Number of children: 2    Years of education: 15    Highest education level: High school graduate   Occupational History    Occupation: Retired-   Social Needs    Financial resource strain: Not hard at all   [place] insecurity     Worry: Never true     Inability: Never true    Transportation needs     Medical: No     Non-medical: No   Tobacco Use    Smoking status: Former Smoker     Packs/day: 1.00     Years: 59.00 21 20   BUN 28* 29*   CREATININE 1.83* 1.76*   CALCIUM 9.9 8.7     Recent Labs     04/29/21  1030   AST 20   ALT 12   BILITOT <0.2   ALKPHOS 118     Recent Labs     04/29/21  1030   INR 1.1     Recent Labs     04/29/21  1030   CKTOTAL 161   TROPONINI 0.053*       Urinalysis:   Lab Results   Component Value Date    NITRU Negative 04/27/2021    WBCUA 0-2 04/27/2021    BACTERIA RARE 04/27/2021    RBCUA 0-2 04/27/2021    BLOODU Negative 04/27/2021    SPECGRAV 1.016 04/27/2021    GLUCOSEU 250 04/27/2021       Radiology:   Most recent    Chest CT      WITH CONTRAST:No results found for this or any previous visit. WITHOUT CONTRAST:   Results for orders placed during the hospital encounter of 08/24/20   CT CHEST WO CONTRAST    Narrative EXAMINATION:  CT SCAN THE CHEST    CLINICAL HISTORY:  Short of breath    COMPARISON:  March 24, 2019    TECHNIQUE:  Multiple serial axial images of the chest from the base the neck through the upper abdomen with both sagittal coronal reconstruction was performed without intravenous or oral administration of contrast.    FINDINGS:    There is a patchy mosaic appearance lung parenchyma that can be seen with small airways disease. There is bibasilar areas of atelectasis, scarring. No focal infiltrates. No effusions no pneumothoraces. No significant periaortic adenopathy. There is pretracheal adenopathy. There is multilevel degenerative changes with osteophytes of the thoracic spine. Impression Unremarkable CT scan the chest as described above      All CT scans at this facility use dose modulation, iterative reconstruction, and/or weight based dosing when appropriate to reduce radiation dose to as low as reasonably achievable.     Examination: CT ABDOMEN PELVIS WO CONTRAST, CT CHEST WO CONTRAST    Indication:   flank pain     Technique: Multiple serial axial images was performed through the abdomen and pelvis without intravenous or oral administration of contrast..   Images were reconstructed in the axial and coronal and sagittal planes. Comparison: September 9, 2019    Findings: The liver, gallbladder, spleen, pancreas, adrenals,  are unremarkable. The kidneys show no significant perinephric stranding. No nephrolithiasis. No hydronephrosis or hydroureter. No bladder calculi. Large and small bowel show no sign of obstruction. The appendix is not visualized. No pericecal stranding. No diverticulitis. No free air. No free fluid. The visualized abdominal aorta is of normal size and caliber. No significant retroperitoneal adenopathy. There is multilevel degenerative changes of lumbar spine. There is a grade 1 anterolisthesis of L4 and L5. There is narrowing of the L5-S1 disc space. Impression: UNREMARKABLE CT SCAN OF THE ABDOMEN AND PELVIS AS DESCRIBED ABOVE      All CT scans at this facility use dose modulation, iterative reconstruction, and/or weight based dosing when appropriate to reduce radiation dose to as low as reasonably achievable. CXR      2-view:   Results for orders placed during the hospital encounter of 08/24/20   XR CHEST (2 VW)    Narrative EXAMINATION: XR CHEST (2 VW)     CLINICAL HISTORY:  sob     COMPARISONS: None     FINDINGS:    Two views of the chest are submitted. The cardiac silhouette is enlarged  Pulmonary vascular congestion with increased interstitial markings. Right sided trachea. No focal infiltrates. No effusions. No Pneumothoraces. Impression PULMONARY VASCULAR CONGESTION WITH INCREASED INTERSTITIAL MARKINGS. RADIOGRAPHIC FINDINGS COULD SUGGEST EARLY CHF. CHF.  CORRELATE CLINICALLY        Portable:   Results for orders placed during the hospital encounter of 04/29/21   XR CHEST PORTABLE    Narrative EXAMINATION: XR CHEST PORTABLE    CLINICAL HISTORY: SHORTNESS OF BREATH    COMPARISONS: ARCH 30, 2021, MARCH 25, 2021    FINDINGS: Image obtained in partial expiration. Interval removal, right head. Osseous structures intact. Cardiopericardial silhouette normal in size. Aorta calcified. Lungs clear. Impression NO ACUTE CARDIOPULMONARY DISEASE       Echo No results found for this or any previous visit. Assessment/Plan:      Acute blood loss anemia likely due to GI bleed: FOBT positive. NPO, H&H Q6hrs. PPI,  IVF, closely monitor BP, intake and output. Document all stools. GI consulted for further recommendations. Elevated trops (chronic)  likely due to accumulation secondary to decreased renal excretion as a result of SAÚL. Will trend and continue to assess. Goal K and Mg 4.0 and 2.0, respectively. If indicated, will need to replace K carefully in setting of renal insufficiency. EKG in AM. Telemetry ordered. SAÚL on CKD (baseline Cr 1.3 and was 1.76) present on admission: Avoid nephrotoxic agents wherever possible. Gentle IVF monitor for fluid overload. Repeat BMP in AM. Strict Intake output. Avoiding fluctuations in BP. Microcytic anemia, likely due to iron deficiency or anemia of chronic disease:  Give one-time dose IV iron    DMII with hyperglycemia: ISS, hypoglycemia protocol POCT Glucose TIDAC & QHS        I personally spent estimated 60 minutes with this patient today. Additional work up or/and treatment plan may be added today or then after based on clinical progression. I am managing a portion of pt care. Some medical issues are handled by other specialists. Additional work up and treatment should be done in out pt setting by pt PCP and other out pt providers. In addition to examining and evaluating pt, I spent additional time explaining care, normaland abnormal findings, and treatment plan. All of pt questions were answered. Counseling, diet and education were provided. Case will be discussed with nursing staff when appropriate. Family will be updated if and when appropriate.       Electronically signed by Olga Ferrara JOHNATHON Crum - CNP on 4/29/2021 at 1:19 PM

## 2021-04-29 NOTE — FLOWSHEET NOTE
1430- Patient arrived to floor. AOx4, no pain. Protonix and NS started. Lungs are clear, no cough, breathing unlabored. Abdomen is soft and non tender. Patient wears a brief at home but states she is continent. Skin is intact.

## 2021-04-29 NOTE — ED NOTES
Per Dr. Saroj Dhillon patient needs to be admitted, but family stated patient has hx of dementia and goes into a psychosis at home and is requesting that daughter stays with patient. Per previous notes in system patient was a 1:1 last visit and attempted to climb out of bed and pull equipment off. Dr. Saroj Dhillon placed a call to hospital supervisor and  on 2wt.       Claudine Pulido RN  04/29/21 [id number]

## 2021-04-29 NOTE — TELEPHONE ENCOUNTER
Writer contacted ED provider Dr Vladimir Flores to inform of 30 day readmission risk via text.      Attending: Valentino Dais

## 2021-04-29 NOTE — ED PROVIDER NOTES
uncontrolled ([address]     hx > 8 yrs    Unspecified sleep apnea        SURGICAL HISTORY       Past Surgical History:   Procedure Laterality Date    BACK SURGERY  2017    lumbar disc    CARDIAC CATHETERIZATION  11/3/14     DR. MIRELES / no stents    COLONOSCOPY  08/29/2016    w/polypectomy     COLONOSCOPY N/A 9/29/2020    COLONOSCOPY WITH POLYPECTOMY performed by Obdulia Pratt MD [place] N/A 10/7/2019    EUA HYSTEROSCOPY DILATATION AND CURETTAGE performed by Miriam Dias DO at [facility]. [address], COLON, DIAGNOSTIC      EYE SURGERY      [place] with IOL OU / [address] [place]  [address]    umbilical hernia repair    MT ESOPHAGOGASTRODUODENOSCOPY TRANSORAL DIAGNOSTIC N/A 3/24/2017    EGD ESOPHAGOGASTRODUODENOSCOPY performed by Dawna Rodriguez MD at [place] [address] 2/8/2018    negative findings    MT REVISE MEDIAN N/CARPAL TUNNEL SURG Left 6/5/2017    LEFT  CARPAL TUNNEL RELEASE performed by Genaro Melendez MD at [facility],4+Z/X,IYCPW N/A 2/8/2018    TONSILLECTOMY      as child    UPPER GASTROINTESTINAL ENDOSCOPY  08/26/2016    w/bx     UPPER GASTROINTESTINAL ENDOSCOPY N/A 2/25/2020    EGD possible biopsy performed by Obdulia Pratt MD at [address] [place] [address] 7/1/2020    EGD PUSH ENTEROSCOPY performed by Jodene Severin, MD at [address] [place] [address]       Current Discharge Medication List      CONTINUE these medications which have NOT CHANGED    Details   carvedilol (COREG) 25 MG tablet Take 2 tablets by mouth 2 times daily  Qty: 120 tablet, Refills: 3      aspirin 81 MG EC tablet Take 1 tablet by mouth daily  Qty: 30 tablet, Refills: 3      levETIRAcetam (KEPPRA) 500 MG tablet Take 1 tablet by mouth 2 times daily  Qty: 60 tablet, Refills: 3      insulin lispro (HUMALOG) 100 UNIT/ML injection vial 18 units at each mels  Qty: 1 vial, Refills: 3      insulin glargine (LANTUS) 100 UNIT/ML injection vial 60 units at bedtime  Qty: 3 vial, Refills: 3    Associated Diagnoses: Type 2 diabetes mellitus with other specified complication, with long-term current use of insulin (HCC)      verapamil (CALAN SR) 120 MG extended release tablet Take 1 tablet by mouth daily  Qty: 90 tablet, Refills: 3      cloNIDine (CATAPRES) 0.2 MG tablet Take 1 tablet by mouth 2 times daily  Qty: 180 tablet, Refills: 3      rosuvastatin (CRESTOR) 40 MG tablet TAKE 1 TABLET BY MOUTH ONCE DAILY IN THE EVENING  Qty: 90 tablet, Refills: 0      PARoxetine (PAXIL) 40 MG tablet TAKE 1 TABLET BY MOUTH ONCE DAILY IN THE MORNING  Qty: 90 tablet, Refills: 1      !! ACCU-CHEK PHYLLIS PLUS strip Test 3x daily Dx E11.65  Qty: 100 each, Refills: 3      Accu-Chek Softclix Lancets MISC bid  Qty: 100 each, Refills: 3      Blood Glucose Monitoring Suppl (ACCU-CHEK PHYLLIS CONNECT) w/Device KIT 1 kit by Does not apply route daily  Qty: 1 kit, Refills: 0      !! blood glucose test strips (ACCU-CHEK PHYLLIS PLUS) strip Patient test 3x daily E65.11  Qty: 100 each, Refills: 3      ipratropium-albuterol (DUONEB) 0.5-2.5 (3) MG/3ML SOLN nebulizer solution Inhale 3 mLs into the lungs 3 times daily  Qty: 360 mL, Refills: 0      Continuous Blood Gluc Sensor (FREESTYLE MURALI 14 DAY SENSOR) MISC Use every 2 weeks  Qty: 2 each, Refills: 1      Continuous Blood Gluc  (FREESTYLE MURALI 14 DAY READER) [place] As directed  Qty: 1 Device, Refills: 0      pantoprazole (PROTONIX) 40 MG tablet Take 40mg twice daily (1st dose 30min before breakfast) for 30 days.  After 30 days, you make take 40mg once daily before breakfast.  Qty: 60 tablet, Refills: 3      dicyclomine (BENTYL) 10 MG capsule Take 1 capsule by mouth 4 times daily as needed (abdominal pain)  Qty: 120 capsule, Refills: 3      ondansetron (ZOFRAN) 4 MG tablet Take 1 tablet by mouth every 8 hours as needed for Nausea or Vomiting  Qty: 30 tablet, Refills: 2       !! - Potential duplicate medications found. Please discuss with provider. ALLERGIES       Allergies   Allergen Reactions    Ibuprofen Nausea Only    Metformin And Related      Diarrhea      Darvon [Propoxyphene Hcl] Nausea And Vomiting       FAMILY HISTORY       Family History   Problem Relation Age of Onset    Heart Disease Father         cardiac bypass    Arthritis Father     Arthritis Mother     Other Mother          at age 80    Other Sister         [facility]    No Known Problems Daughter     Stroke Son         SOCIAL HISTORY       Social History     Socioeconomic History    Marital status:      Spouse name: Jaciel Trujillo Number of children: 2    Years of education: 15    Highest education level: High school graduate   Occupational History    Occupation: Retired-   Social Needs    Financial resource strain: Not hard at all   [place] insecurity     Worry: Never true     Inability: Never true    Transportation needs     Medical: No     Non-medical: No   Tobacco Use    Smoking status: Former Smoker     Packs/day: 1.00     Years: 59.00     Pack years: 59.00     Types: Cigarettes     Start date: 2017    Smokeless tobacco: Never Used    Tobacco comment: quit 2-3 weeks ago    Substance and Sexual Activity    Alcohol use: Not Currently    Drug use: No    Sexual activity: Yes     Partners: Male   Lifestyle    Physical activity     Days per week: 0 days     Minutes per session: 0 min    Stress: Only a little   Relationships    Social connections     Talks on phone: More than three times a week     Gets together: More than three times a week     Attends Synagogue service: 1 to 4 times per year     Active member of club or organization: No     Attends meetings of clubs or organizations: Never     Relationship status: Living with partner   [facility] Intimate partner violence     Fear of current or ex partner: No     Emotionally abused:  No Physically abused: No     Forced sexual activity: No   Other Topics Concern    None   Social History Narrative    Grew up in [place]     Lives With:  [id number] Spouse    Type of Home: House    Home Layout: Two level, Performs ADL's on one level    Home Access: Stairs to enter with rails    Entrance Stairs - Number of Steps: 4    Bathroom Shower/Tub: Tub/Shower unit, Doors    Bathroom Equipment: Shower chair, Grab bars in [place]: Rolling walker, Cane, Oxygen(uses her O2 very seldom)    ADL Assistance: Needs assistance    Homemaking Assistance: Needs assistance    Homemaking Responsibilities: No(spouse performs)    Ambulation Assistance: Independent    Transfer Assistance: Independent    Active : No    Occupation: Retired    Type of occupation: worked in [employer]: patient enjoys [employer]     Review of Systems   Constitutional: Positive for fatigue. Negative for activity change, chills and fever. Low hemoglobin   HENT: Negative for congestion, rhinorrhea and sinus pain. Eyes: Negative for pain and discharge. Respiratory: Negative for cough, shortness of breath and wheezing. Cardiovascular: Negative for chest pain and palpitations. Gastrointestinal: Negative for abdominal distention, abdominal pain, constipation, diarrhea, nausea and vomiting. Genitourinary: Negative for difficulty urinating, dysuria and hematuria. Musculoskeletal: Negative for joint swelling and myalgias. Skin: Negative for rash. Neurological: Positive for weakness. Negative for dizziness, light-headedness, numbness and headaches. Except as noted above the remainder of the review of systems was reviewed and is negative.   SCREENINGS                        PHYSICAL EXAM    (up to 7 for level 4, 8 or more for level 5)     ED Triage Vitals   BP Temp Temp Source Pulse Resp SpO2 Height Weight   04/29/21 1022 04/29/21 1013 04/29/21 1013 04/29/21 1013 04/29/21 1013 04/29/21 1013 04/29/21 1013 04/29/21 1013   (!) 141/108 98.2 °F (36.8 °C) Temporal 84 19 100 % 4' 11\" (1.499 m) 140 lb (63.5 kg)       Physical Exam  Constitutional:       General: She is not in acute distress. Appearance: Normal appearance. She is normal weight. She is not ill-appearing. HENT:      Head: Normocephalic and atraumatic. Right Ear: External ear normal.      Left Ear: External ear normal.      Nose: Nose normal. No congestion or rhinorrhea. Mouth/Throat:      Mouth: Mucous membranes are moist.      Pharynx: No oropharyngeal exudate or posterior oropharyngeal erythema. Eyes:      General:         Right eye: No discharge. Left eye: No discharge. Extraocular Movements: Extraocular movements intact. Pupils: Pupils are equal, round, and reactive to light. Neck:      Musculoskeletal: Normal range of motion and neck supple. Cardiovascular:      Rate and Rhythm: Normal rate and regular rhythm. Pulses: Normal pulses. Pulmonary:      Effort: Pulmonary effort is normal.      Breath sounds: Normal breath sounds. Abdominal:      General: Abdomen is flat. Bowel sounds are normal. There is no distension. Tenderness: There is no abdominal tenderness. There is no right CVA tenderness, left CVA tenderness, guarding or rebound. Musculoskeletal: Normal range of motion. General: No swelling or tenderness. Right lower leg: No edema. Left lower leg: No edema. Skin:     General: Skin is warm and dry. Capillary Refill: Capillary refill takes less than 2 seconds. Neurological:      General: No focal deficit present. Mental Status: She is alert.    Psychiatric:         Mood and Affect: Mood normal.           DIAGNOSTIC RESULTS     EKG:(none if blank)  All EKGs are interpreted by the Emergency Department Physician who either signs or Co-signs this chart in the absence of a cardiologist.    EKG performed at 10:26 AM shows normal sinus rhythm with a heart of 78 bpm, normal intervals including QTC of 417 ms. No significant ST-T wave abnormalities. RADIOLOGY: (none if blank)   I directly visualized the following images and reviewed the radiologist interpretations. Interpretation per the Radiologist below, if available at the time of this note:  XR CHEST PORTABLE   Final Result   NO ACUTE CARDIOPULMONARY DISEASE          LABS:  Labs Reviewed   COMPREHENSIVE METABOLIC PANEL - Abnormal; Notable for the following components:       Result Value    Glucose 143 (*)     BUN 29 (*)     CREATININE 1.76 (*)     GFR Non- 28.0 (*)     GFR  33.9 (*)     Total Protein 6.2 (*)     All other components within normal limits   CBC WITH AUTO DIFFERENTIAL - Abnormal; Notable for the following components:    RBC 2.88 (*)     Hemoglobin 7.8 (*)     Hematocrit 25.1 (*)     MCHC 31.3 (*)     RDW 18.2 (*)     Platelets 950 (*)     All other components within normal limits   TROPONIN - Abnormal; Notable for the following components:    Troponin 0.053 (*)     All other components within normal limits    Narrative:     CALL  [facility]  [facility] tel. [phone],  Dimer results called to and read back by Mani Hinton, 04/29/2021 11:09, by  [facility]   COVID-19, RAPID   PROTIME-INR   APTT   CK   URINE RT REFLEX TO CULTURE   TYPE AND SCREEN       All other labs were within normal range or not returned as of this dictation. Please note, any cultures that may have been sent were not resulted at the time of this patient visit. EMERGENCY DEPARTMENT COURSE and Medical Decision Making:     Vitals:    Vitals:    04/29/21 1022 04/29/21 1130 04/29/21 1200 04/29/21 1230   BP: (!) 141/108 135/66 (!) 163/69 (!) 152/76   Pulse: 71 77 74 79   Resp: 18 22 20 19   Temp:       TempSrc:       SpO2: 99% 100% 100% 100%   Weight:       Height:           PROCEDURES: (None if blank)  Procedures       MDM     Patient came in today with complaint of low hemoglobin of 7.5. On repeat 7.8. Patient did have an order for a transfusion per the  today from the PCP but he states he brought her into the emergency department earlier because of \"how she was feeling\". He states that she was more lethargic. Patient's repeat hemoglobin today is 7.8. Compared to 2 weeks ago it dropped about a point and a half and she usually runs in the 9.5 range. Patient has a known kidney disease and her creatinine is at baseline. Patient's vitals are otherwise stable, no tachycardia or hypoxia. Patient's Hemoccult test was positive. With her decreasing hemoglobin and positive fecal occult test, the patient was started on Protonix. Will call for the patient to be admitted for observation and GI consult and trending H&H's. Daughter at bedside states that she does not want her to be admitted secondary to her psychosis at night when she does get admitted. She is concerned that the patient will decompensate while she is here. She states that the only way that she will let her stay is if she is able to stay here with her overnight. I did speak to the 2 W. nurse upstairs, George Mishra, who did agree to let the daughter stay with the patient throughout her stay. I spoke to the hospitalist, Dr. Maurice Rodriguez, who agrees to admit the patient for further monitoring. I did speak to the patient  who is POA who states that the patient's CODE STATUS is DNR CCA. Strict return precautions and follow up instructions were discussed with the patient with which the patient agrees    ED Medications administered this visit:    Medications   pantoprazole (PROTONIX) 40 mg in sodium chloride 0.9 % 50 mL bolus (40 mg Intravenous New Bag 4/29/21 1232)         FINAL IMPRESSION      1. Gastrointestinal hemorrhage, unspecified gastrointestinal hemorrhage type    2.  Iron deficiency anemia, unspecified iron deficiency anemia type          DISPOSITION/PLAN   DISPOSITION Admitted 04/29/2021 12:45:42 PM      PATIENT

## 2021-04-29 NOTE — ED NOTES
Dr. Kelli Meraz discussed patient care with Kathreen Pallas RN 2west manager to get patient daughter to stay with patient while in hospital. Front door registration called and made aware and given patient daughter name.       Trinidad Smalls RN  04/29/21 9852

## 2021-04-29 NOTE — TELEPHONE ENCOUNTER
Delano recinos. They kept [facility]Y [address] for a GI bleed. They are looking for the infusion order from you. If you haven't, can you, please order ASAP.

## 2021-04-29 NOTE — ED TRIAGE NOTES
Patient arrived from home via family with complaint of anemia and chest pain. Patient was recently d/c from [facility] with admission due to MI without stent placement. Patient A&OX4 upon arrival. Patient also has complaint of sob. Family stated patient usually walks with walker, but hasnt been due to sob and chest pain. Patient had follow up with pcp and had blood drawn on Tuesday with 7.5 hgb.

## 2021-04-29 NOTE — TELEPHONE ENCOUNTER
Notified that the patient has gone to the ER. I called and spoke with Martina Monique at the infusion center just to be sure as to what would happen with this patient and the infusion that is necessary. I was told that now the infusion would be handled with the ER. If they feel that is necessary they will give there.   Or if the patient is admitted for further eval.  Nothing more is necessary from the PCP's office    all

## 2021-04-30 VITALS
TEMPERATURE: 97.7 F | HEIGHT: 59 IN | HEART RATE: 89 BPM | RESPIRATION RATE: 19 BRPM | SYSTOLIC BLOOD PRESSURE: 172 MMHG | DIASTOLIC BLOOD PRESSURE: 82 MMHG | OXYGEN SATURATION: 100 % | BODY MASS INDEX: 28.22 KG/M2 | WEIGHT: 140 LBS

## 2021-04-30 LAB
ANION GAP SERPL CALCULATED.3IONS-SCNC: 13 MEQ/L (ref 9–15)
BASOPHILS ABSOLUTE: 0 K/UL (ref 0–0.2)
BASOPHILS RELATIVE PERCENT: 0.7 %
BUN BLDV-MCNC: 23 MG/DL (ref 8–23)
CALCIUM SERPL-MCNC: 8.7 MG/DL (ref 8.5–9.9)
CHLORIDE BLD-SCNC: 110 MEQ/L (ref 95–107)
CO2: 20 MEQ/L (ref 20–31)
CREAT SERPL-MCNC: 1.78 MG/DL (ref 0.5–0.9)
EOSINOPHILS ABSOLUTE: 0.2 K/UL (ref 0–0.7)
EOSINOPHILS RELATIVE PERCENT: 2.6 %
GFR AFRICAN AMERICAN: 33.5
GFR NON-AFRICAN AMERICAN: 27.7
GLUCOSE BLD-MCNC: 141 MG/DL (ref 60–115)
GLUCOSE BLD-MCNC: 76 MG/DL (ref 60–115)
GLUCOSE BLD-MCNC: 90 MG/DL (ref 70–99)
HCT VFR BLD CALC: 23.9 % (ref 37–47)
HCT VFR BLD CALC: 26.1 % (ref 37–47)
HCT VFR BLD CALC: 27.8 % (ref 37–47)
HEMOGLOBIN: 7.7 G/DL (ref 12–16)
HEMOGLOBIN: 8.4 G/DL (ref 12–16)
HEMOGLOBIN: 9.1 G/DL (ref 12–16)
IRON SATURATION: 99 % (ref 11–46)
IRON: 374 UG/DL (ref 37–145)
LYMPHOCYTES ABSOLUTE: 0.9 K/UL (ref 1–4.8)
LYMPHOCYTES RELATIVE PERCENT: 12.6 %
MCH RBC QN AUTO: 27.7 PG (ref 27–31.3)
MCHC RBC AUTO-ENTMCNC: 32.6 % (ref 33–37)
MCV RBC AUTO: 85.1 FL (ref 82–100)
MONOCYTES ABSOLUTE: 0.7 K/UL (ref 0.2–0.8)
MONOCYTES RELATIVE PERCENT: 9.4 %
NEUTROPHILS ABSOLUTE: 5.4 K/UL (ref 1.4–6.5)
NEUTROPHILS RELATIVE PERCENT: 74.7 %
ORGANISM: ABNORMAL
PDW BLD-RTO: 16.2 % (ref 11.5–14.5)
PERFORMED ON: ABNORMAL
PERFORMED ON: NORMAL
PLATELET # BLD: 484 K/UL (ref 130–400)
POTASSIUM SERPL-SCNC: 4.4 MEQ/L (ref 3.4–4.9)
RBC # BLD: 3.27 M/UL (ref 4.2–5.4)
SODIUM BLD-SCNC: 143 MEQ/L (ref 135–144)
TOTAL IRON BINDING CAPACITY: 376 UG/DL (ref 178–450)
TROPONIN: 0.06 NG/ML (ref 0–0.01)
URINE CULTURE, ROUTINE: ABNORMAL
WBC # BLD: 7.3 K/UL (ref 4.8–10.8)

## 2021-04-30 PROCEDURE — 85025 COMPLETE CBC W/AUTO DIFF WBC: CPT

## 2021-04-30 PROCEDURE — 94761 N-INVAS EAR/PLS OXIMETRY MLT: CPT

## 2021-04-30 PROCEDURE — 83550 IRON BINDING TEST: CPT

## 2021-04-30 PROCEDURE — G0378 HOSPITAL OBSERVATION PER HR: HCPCS

## 2021-04-30 PROCEDURE — 80048 BASIC METABOLIC PNL TOTAL CA: CPT

## 2021-04-30 PROCEDURE — 84484 ASSAY OF TROPONIN QUANT: CPT

## 2021-04-30 PROCEDURE — 36430 TRANSFUSION BLD/BLD COMPNT: CPT

## 2021-04-30 PROCEDURE — 85014 HEMATOCRIT: CPT

## 2021-04-30 PROCEDURE — 36415 COLL VENOUS BLD VENIPUNCTURE: CPT

## 2021-04-30 PROCEDURE — 85018 HEMOGLOBIN: CPT

## 2021-04-30 PROCEDURE — 83540 ASSAY OF IRON: CPT

## 2021-04-30 PROCEDURE — 94640 AIRWAY INHALATION TREATMENT: CPT

## 2021-04-30 PROCEDURE — 6370000000 HC RX 637 (ALT 250 FOR IP): Performed by: INTERNAL MEDICINE

## 2021-04-30 RX ADMIN — ASPIRIN 81 MG: 81 TABLET, COATED ORAL at 09:11

## 2021-04-30 RX ADMIN — PANTOPRAZOLE SODIUM 40 MG: 40 TABLET, DELAYED RELEASE ORAL at 06:20

## 2021-04-30 RX ADMIN — CARVEDILOL 25 MG: 25 TABLET, FILM COATED ORAL at 09:10

## 2021-04-30 RX ADMIN — VERAPAMIL HYDROCHLORIDE 120 MG: 240 TABLET, FILM COATED, EXTENDED RELEASE ORAL at 09:11

## 2021-04-30 RX ADMIN — LEVETIRACETAM 500 MG: 500 TABLET ORAL at 09:11

## 2021-04-30 RX ADMIN — PAROXETINE HYDROCHLORIDE 40 MG: 20 TABLET, FILM COATED ORAL at 09:11

## 2021-04-30 RX ADMIN — IPRATROPIUM BROMIDE AND ALBUTEROL SULFATE 3 ML: .5; 3 SOLUTION RESPIRATORY (INHALATION) at 07:56

## 2021-04-30 NOTE — FLOWSHEET NOTE
0910- AOx4, no pain. Medications given with no issues. IV fluids stopped at this time.  at bedside and states that he is taking her home today, whether she is discharged or not. 1230- PICC line removed from right arm, no complications. IV removed from left AC. Telemetry sent to [facility]. Discharge instructions given to patient with  present. Patient aware of follow up appts. Patient and  state that they have all belongings and did not bring in any home medications.

## 2021-04-30 NOTE — DISCHARGE INSTR - DIET

## 2021-04-30 NOTE — PROGRESS NOTES
history  ?  20 pack years  []   Pulmonary Disorder  (acute or chronic)  [x]   Severe or Chronic w/ Exacerbation  []     Surgical Status No [x]   Surgeries     General []   Surgery Lower []   Abdominal Thoracic or []   Upper Abdominal Thoracic with  PulmonaryDisorder  []     Chest X-ray Clear/Not  Ordered     [x]  Chronic Changes  Results Pending  []  Infiltrates, atelectasis, pleural effusion, or edema  []  Infiltrates in more than one lobe []  Infiltrate + Atelectasis, &/or pleural effusion  []    Respiratory Pattern Regular,  RR = 12-20 [x]  Increased,  RR = 21-25 []  MENDEZ, irregular,  or RR = 26-30 []  Decreased FEV1  or RR = 31-35 []  Severe SOB, use  of accessory muscles, or RR ? 35  []    Mental Status Alert, oriented,  Cooperative [x]  Confused but Follows commands []  Lethargic or unable to follow commands []  Obtunded  []  Comatose  []    Breath Sounds Clear to  auscultation  []  Decreased unilaterally or  in bases only []  Decreased  bilaterally  [x]  Crackles or intermittent wheezes []  Wheezes []    Cough Strong, Spontan., & nonproductive [x]  Strong,  spontaneous, &  productive []  Weak,  Nonproductive []  Weak, productive or  with wheezes []  No spontaneous  cough or may require suctioning []    Level of Activity Ambulatory [x]  Ambulatory w/ Assist  []  Non-ambulatory []  Paraplegic []  Quadriplegic []    Total    Score:___5____     Triage Score:___5_____      Tri       Triage:     1. (>20) Freq: Q3    2. (16-20) Freq: Q4   3. (11-15) Freq: QID & Albuterol Q2 PRN    4. (6-10) Freq: TID & Albuterol Q2 PRN    5. (0-5) Freq Q4prn

## 2021-04-30 NOTE — DISCHARGE SUMMARY
Hospital Medicine Discharge Summary    Sisi Raines  :  1945  MRN:  [id number]    Admit date:  2021  Discharge date:      Admitting Physician:  Aamya Marcus DO  Primary Care Physician:  Makenzie Horton MD      Discharge Diagnoses:    Chronic GI bleed. CKD 3. Coronary disease. Non-MI troponin elevation. Chronic normocytic anemia. Iron deficiency anemia. Hospital Course:   Sisi Raines is a 67 y.o. female that was admitted and treated at [facility] for increasing lethargy and fatigue in the setting of chronic GI bleed. Patient has had multiple endoscopic evaluations in the past including a PillCam which showed some bleeding throughout the distal small intestine. She has been treated also has advancing CKD 3 which is likely contributing to her anemia at some point as well. She was recently admitted to [facility] with cardiac arrest and has been successfully discharged home. At this point in time she was transfused to a hemoglobin of 9.1 and she will be discharged with hematology follow-up. PICC line was placed as no one was able to place an IV in her while on admission this will be removed removed prior to discharge. She will follow up with hematology for hospital erythropoietin versus iron versus blood transfusions as needed. She also follows with nephrology due to her CKD. Of note the patient did have mild Trop elevation on arrival which is chronic and baseline and troponins have been flat with no signs of acute coronary events. Patient was seen by the following consultants while admitted to [facility]:   Consults:  IP CONSULT TO GI    Physical Exam:   General appearance: No apparent distress, appears stated age and cooperative. HEENT: Pupils equal, round, and reactive to light. Conjunctivae/corneas clear. Neck: Supple, with full range of motion. No jugular venous distention. Trachea midline.   Respiratory:  Normal respiratory effort. Clear to auscultation, bilaterally without Rales/Wheezes/Rhonchi. Cardiovascular: Regular rate and rhythm with EDITH 2 out of 6  Abdomen: Soft, non-tender, non-distended with normal bowel sounds. Musculoskeletal: No clubbing, cyanosis or edema bilaterally. Full range of motion without deformity. Skin: Skin color, texture, turgor normal.  No rashes or lesions. Neuro: Non Focal. Symetrical motor and tone. Nl Comprehension, Alert,awake and oriented. Psychiatric: Alert and oriented, thought content appropriate, normal insight  Peripheral Pulses: +2 palpable, equal bilaterally    Significant Diagnostic Studies:  cxr  EXAMINATION: XR CHEST PORTABLE       CLINICAL HISTORY: SHORTNESS OF BREATH       COMPARISONS: ARCH 30, 2021, MARCH 25, 2021       FINDINGS: Image obtained in partial expiration. Interval removal, right head. Osseous structures intact. Cardiopericardial silhouette normal in size. Aorta calcified.  Lungs clear.           Impression   NO ACUTE CARDIOPULMONARY DISEASE         Discharge Medications:       Helder Gomez   [place] Medication Instructions [id number]    Printed on:04/30/21 1215   Medication Information                      ACCU-CHEK PHYLLIS PLUS strip  Test 3x daily Dx E11.65             Accu-Chek Softclix Lancets MISC  bid             aspirin 81 MG EC tablet  Take 1 tablet by mouth daily             Blood Glucose Monitoring Suppl (ACCU-CHEK PHYLLIS CONNECT) w/Device KIT  1 kit by Does not apply route daily             blood glucose test strips (ACCU-CHEK PHYLLIS PLUS) strip  Patient test 3x daily E65.11             carvedilol (COREG) 25 MG tablet  Take 2 tablets by mouth 2 times daily             cloNIDine (CATAPRES) 0.2 MG tablet  Take 1 tablet by mouth 2 times daily             Continuous Blood Gluc  (FREESTYLE MURALI 14 DAY READER) [place]  As directed             Continuous Blood Gluc Sensor (FREESTYLE MURALI 14 DAY SENSOR) [facility]  Use every 2 weeks dicyclomine (BENTYL) 10 MG capsule  Take 1 capsule by mouth 4 times daily as needed (abdominal pain)             insulin glargine (LANTUS) 100 UNIT/ML injection vial  60 units at bedtime             insulin lispro (HUMALOG) 100 UNIT/ML injection vial  18 units at each mels             ipratropium-albuterol (DUONEB) 0.5-2.5 (3) MG/3ML SOLN nebulizer solution  Inhale 3 mLs into the lungs 3 times daily             levETIRAcetam (KEPPRA) 500 MG tablet  Take 1 tablet by mouth 2 times daily             ondansetron (ZOFRAN) 4 MG tablet  Take 1 tablet by mouth every 8 hours as needed for Nausea or Vomiting             pantoprazole (PROTONIX) 40 MG tablet  Take 40mg twice daily (1st dose 30min before breakfast) for 30 days. After 30 days, you make take 40mg once daily before breakfast.             PARoxetine (PAXIL) 40 MG tablet  TAKE 1 TABLET BY MOUTH ONCE DAILY IN THE MORNING             rosuvastatin (CRESTOR) 40 MG tablet  TAKE 1 TABLET BY MOUTH ONCE DAILY IN THE EVENING             verapamil (CALAN SR) 120 MG extended release tablet  Take 1 tablet by mouth daily                 Disposition:   Discharged to Home. Any C needs that were indicated and/or required as been addressed and set up by Social Work. Condition at discharge: Pt was medically stable at the time of discharge. Activity: activity as tolerated    Total time taken for discharging this patient: 40 minutes. Greater than 70% of time was spent focused exclusively on this patient. Time was taken to review chart, discuss plans with consultants, reconciling medications, discussing plan answering questions with patient.      SignedBeckey Holding Sedar  4/30/2021, 12:15 PM

## 2021-05-01 ENCOUNTER — CARE COORDINATION (OUTPATIENT)
Dept: CASE MANAGEMENT | Age: 76
End: 2021-05-01

## 2021-05-01 DIAGNOSIS — R30.0 DYSURIA: Primary | ICD-10-CM

## 2021-05-01 LAB
BLOOD BANK DISPENSE STATUS: NORMAL
BLOOD BANK DISPENSE STATUS: NORMAL
BLOOD BANK PRODUCT CODE: NORMAL
BLOOD BANK PRODUCT CODE: NORMAL
BPU ID: NORMAL
BPU ID: NORMAL
DESCRIPTION BLOOD BANK: NORMAL
DESCRIPTION BLOOD BANK: NORMAL

## 2021-05-01 RX ORDER — AMOXICILLIN 250 MG/1
250 CAPSULE ORAL 3 TIMES DAILY
Qty: 15 CAPSULE | Refills: 0 | Status: SHIPPED | OUTPATIENT
Start: 2021-05-01 | End: 2021-05-06

## 2021-05-03 ENCOUNTER — CARE COORDINATION (OUTPATIENT)
Dept: CASE MANAGEMENT | Age: 76
End: 2021-05-03

## 2021-05-03 NOTE — CARE COORDINATION
[facility] Transitions Initial Follow Up Call    Call within 2 business days of discharge: Yes    Patient: Sisi Raines Patient : 1945   MRN: [id number]  Reason for Admission: GI   HEMMORRHAGE Discharge Date: 21 RARS: Readmission Risk Score: 29      Last Discharge [address] [place] [address]       Complaint Diagnosis Description Type Department Provider    21 Anemia Gastrointestinal hemorrhage, unspecified gastrointestinal hemorrhage type . .. ED to Hosp-Admission (Discharged) (ADMITTED) LÓPEZ Gilbert DO; Zan Galdamez DO           Spoke with: 24 hour initial call  2nd attempt. No answer on one line. The cell phone line- the  answered and said pt. Is not available to talk.   Final call    Facility: [address]      Non-face-to-face services provided:  n/a    Care Transitions 24 Hour Call    Post Acute Services: [facility] (Comment: [facility])  Patient DME: Straight cane, Walker, Shower chair  Do you have support at home?: Partner/Spouse/SO  Are you an active caregiver in your home?: No  Care Transitions Interventions         Follow Up  Future Appointments   Date Time Provider Zaid rL   2021  3:30 PM Claudeen Drown, [facility]   2021 10:00 AM Faina Bhardwaj MD [facility]   2021  2:30 PM Chadwick Cruz  [address] Care Transitions Nurse   [phone]

## 2021-05-06 ENCOUNTER — INITIAL CONSULT (OUTPATIENT)
Dept: INTERVENTIONAL RADIOLOGY/VASCULAR | Age: 76
End: 2021-05-06
Payer: MEDICARE

## 2021-05-06 ENCOUNTER — VIRTUAL VISIT (OUTPATIENT)
Dept: GASTROENTEROLOGY | Age: 76
End: 2021-05-06
Payer: MEDICARE

## 2021-05-06 ENCOUNTER — TELEPHONE (OUTPATIENT)
Dept: INTERVENTIONAL RADIOLOGY/VASCULAR | Age: 76
End: 2021-05-06

## 2021-05-06 VITALS
SYSTOLIC BLOOD PRESSURE: 121 MMHG | DIASTOLIC BLOOD PRESSURE: 70 MMHG | WEIGHT: 140 LBS | HEART RATE: 70 BPM | TEMPERATURE: 97.7 F | BODY MASS INDEX: 28.28 KG/M2

## 2021-05-06 DIAGNOSIS — R53.1 GENERALIZED WEAKNESS: ICD-10-CM

## 2021-05-06 DIAGNOSIS — R53.83 FATIGUE, UNSPECIFIED TYPE: ICD-10-CM

## 2021-05-06 DIAGNOSIS — D64.9 ANEMIA, UNSPECIFIED TYPE: Primary | ICD-10-CM

## 2021-05-06 DIAGNOSIS — I87.8 POOR VENOUS ACCESS: Primary | ICD-10-CM

## 2021-05-06 DIAGNOSIS — D50.0 IRON DEFICIENCY ANEMIA SECONDARY TO BLOOD LOSS (CHRONIC): ICD-10-CM

## 2021-05-06 PROCEDURE — 99442 PR PHYS/QHP TELEPHONE EVALUATION 11-20 MIN: CPT | Performed by: SPECIALIST

## 2021-05-06 PROCEDURE — G8427 DOCREV CUR MEDS BY ELIG CLIN: HCPCS | Performed by: NURSE PRACTITIONER

## 2021-05-06 PROCEDURE — 99204 OFFICE O/P NEW MOD 45 MIN: CPT | Performed by: NURSE PRACTITIONER

## 2021-05-06 PROCEDURE — G8417 CALC BMI ABV UP PARAM F/U: HCPCS | Performed by: NURSE PRACTITIONER

## 2021-05-06 PROCEDURE — 1090F PRES/ABSN URINE INCON ASSESS: CPT | Performed by: NURSE PRACTITIONER

## 2021-05-06 ASSESSMENT — ENCOUNTER SYMPTOMS
NAUSEA: 0
ANAL BLEEDING: 0
ABDOMINAL PAIN: 1
ABDOMINAL DISTENTION: 0
ABDOMINAL DISTENTION: 0
CONSTIPATION: 0
BLOOD IN STOOL: 0
SHORTNESS OF BREATH: 1
EYES NEGATIVE: 1
CONSTIPATION: 0
NAUSEA: 0
SORE THROAT: 0
TROUBLE SWALLOWING: 0
DIARRHEA: 0
EYES NEGATIVE: 1
RECTAL PAIN: 0
VOMITING: 0
VOMITING: 0
COUGH: 1
GASTROINTESTINAL NEGATIVE: 1
WHEEZING: 1
RESPIRATORY NEGATIVE: 1
ABDOMINAL PAIN: 0
DIARRHEA: 0

## 2021-05-06 NOTE — TELEPHONE ENCOUNTER
PATIENT WAS GIVEN THIS INFORMATION PRIOR TO LEAVING THE OFFICE / VIA PHONE CONVERSATION - VOICED UNDERSTANDING  >  YOUR PROCEDURE IS SCHEDULED ON: 05/14/21@ 9  >  You will need to arrive at 7:30 and check in at the Diagnostic Imaging Check In desk.   >  Do not eat or drink after midnight or  hours before the scheduled procedure time. Sips of water is acceptable ONLY to take medications. >  Make arrangements for transportation, as you should not drive immediately after.  > You need to call [phone], option 2, to pre-register for the procedure the day before it is scheduled.    stop aspirin 5 days before procedure   F/u with emily on [email or web address]

## 2021-05-06 NOTE — PROGRESS NOTES
Gastroenterology Clinic Follow up Visit    Roverto Harris  [id number]  Chief Complaint   Patient presents with    Anemia    Rectal Bleeding       HPI and A/P at last visit summarized below: This was a telephone encounter and patient was at home and I was in my office. This was a patient initiated telephone encounter and she was told it is a billable service and had agreed for that she has a history of recurrent GI bleeding and had EGD colonoscopy and also capsule endoscopy which were unremarkable, patient has been getting transfusions on a as needed basis and is being followed by the hematology service. Reports no abdominal pain nausea or vomiting. No history of hematemesis or melena, duration of phone call was 15 minutes    Review of Systems   Constitutional: Negative. HENT: Negative. Eyes: Negative. Respiratory: Negative. Cardiovascular: Negative. Gastrointestinal: Negative. Negative for abdominal distention, abdominal pain, anal bleeding, blood in stool, constipation, diarrhea, nausea, rectal pain and vomiting. No abdominal pain nausea vomiting   Endocrine: Negative. Genitourinary: Negative. Musculoskeletal: Negative. Skin: Negative. Allergic/Immunologic: Negative for food allergies. Neurological: Negative. Hematological: Negative. Psychiatric/Behavioral: Negative. Past medical history, past surgical history, medication list, social and familyhistory reviewed    not currently breastfeeding. Physical Exam    Laboratory, Pathology, Radiology reviewed in detail with relevantimportant investigations summarized below:    Recent Labs     04/30/21  1123 04/30/21  0604 04/30/21  0051 04/29/21  1030 04/29/21  1030 04/27/21  1559   WBC 7.3  --   --   --  6.3 7.3   HGB 9.1* 7.7* 8.4*   < > 7.8* 7.5*   HCT 27.8* 23.9* 26.1*   < > 25.1* 23.6*   MCV 85.1  --   --   --  87.1 86.6   *  --   --   --  543* 491*    < > = values in this interval not displayed. Lab Results   Component Value Date    ALT 12 04/29/2021    AST 20 04/29/2021    GGT 29 09/15/2018    ALKPHOS 118 04/29/2021    BILITOT <0.2 04/29/2021     Xr Chest Portable    Result Date: 4/29/2021  EXAMINATION: XR CHEST PORTABLE CLINICAL HISTORY: SHORTNESS OF BREATH COMPARISONS: ARCH 30, 2021, MARCH 25, 2021 FINDINGS: Image obtained in partial expiration. Interval removal, right head. Osseous structures intact. Cardiopericardial silhouette normal in size. Aorta calcified. Lungs clear. NO ACUTE CARDIOPULMONARY DISEASE    Ir Picc Wo Sq Port/pump > 5 Years    Result Date: 4/29/2021  PERIPHERALLY INSERTED CENTRAL CATHETER (PICC) PLACEMENT WITH ULTRASOUND GUIDANCE CLINICAL HISTORY:  REQUIRES INTRAVENOUS ACCESS After discussing the procedure and possible complications with the patient, informed consent was obtained. The patient was placed on the Special Procedures table. The right upper extremity was sterilely prepared using a maximal sterile barrier technique which includes cap, mask, sterile gown, sterile gloves, sterile full-body drape, hand hygiene and 2% chlorhexidine for cutaneous antisepsis. A sterile ultrasound technique with sterile gel and sterile probe covers was also utilized. A pre-procedure time out was performed in order to assure the correct patient and procedure. Local anesthetic was administered. A peripheral vein was accessed with sonographic guidance. A sonographic spot image was obtained for documentation. A guidewire was advanced into the vein with fluoroscopic guidance and a sheath was placed over the guidewire. A 5-Dominican dual-lumen PICC was advanced through the sheath, up the arm and into the central vasculature. It was positioned appropriately. The sheath was removed. The catheter was shown to aspirate and infuse properly. The flange of the catheter was affixed to the arm using a PICC securement device.   A spot image of the chest showed the tip of the PICC line to lie in the superior vena cava. The patient tolerated the procedure well and without complications. Number of films: 3 Fluoroscopy time: 15.1 seconds CONCLUSION: SUCCESSFUL RIGHT PICC PLACEMENT WITHOUT IMMEDIATE COMPLICATIONS. Endoscopic investigations:     Assessment and Plan:  Harlene Flatness 67 y.o. female for follow up. Anemia etiology unclear. EGD colonoscopy and capsule endoscopy was unremarkable, patient is getting periodic transfusion of blood and also iron infusion. She is being followed by the hematology service. Follow-up as needed. Diagnosis Orders   1. Anemia, unspecified type         No follow-ups on file. John Timmons MD   StaffGastroenterologist  [facility]    Please note this report has been partially produced using speech recognitionsoftware  and may cause contain errors related to that system including grammar, punctuation and spelling as well as words andphrases that may seem inappropriate. If there are questions or concerns please feel free to contact me to clarify.

## 2021-05-06 NOTE — PROGRESS NOTES
Teresa Pinon, a female of 67 y.o. came to the office 2021. Chief Complaint   Patient presents with    Surgical Consult     mediport placement       HPI: Teresa Pinon is a pleasant female who presents to clinic for consultation at the request of oncology for evaluation of placement of mediport secondary to poor venous access. Mediport required for IV blood and iron infusions with Dx. iron deficiency anemia secondary to blood loss. Last dose ASA 2021. Patient presents with symptoms of: fatigue, generalized weakness. She is in a wheelchair accompanied by her spouse and daughter. Family History   Problem Relation Age of Onset    Heart Disease Father         cardiac bypass    Arthritis Father     Arthritis Mother     Other Mother          at age 80    Other Sister         [facility]    No Known Problems Daughter     Stroke Son        Past Surgical History:   Procedure Laterality Date    BACK SURGERY  2017    lumbar disc    CARDIAC CATHETERIZATION  11/3/14     DR. MIRELES / no stents    COLONOSCOPY  2016    w/polypectomy     COLONOSCOPY N/A 2020    COLONOSCOPY WITH POLYPECTOMY performed by Saqib Alfonso MD at [facility] N/A 10/7/2019    EUA HYSTEROSCOPY DILATATION AND CURETTAGE performed by Claudia Edgar DO at [facility]. [address], COLON, DIAGNOSTIC      EYE SURGERY      [place] with IOL OU / [id number]    umbilical hernia repair    VT ESOPHAGOGASTRODUODENOSCOPY TRANSORAL DIAGNOSTIC N/A 3/24/2017    EGD ESOPHAGOGASTRODUODENOSCOPY performed by Ciro Bloom MD at [place] [id number] 2018    negative findings    VT REVISE MEDIAN N/CARPAL TUNNEL SURG Left 2017    LEFT  CARPAL TUNNEL RELEASE performed by Kalpesh Lamar MD at [facility] GSLE,0+G/K,DKNFH N/A 2018    TONSILLECTOMY      as child    UPPER More than three times a week     Gets together: More than three times a week     Attends [employer] service: 1 to 4 times per year     Active member of club or organization: No     Attends meetings of clubs or organizations: Never     Relationship status: Living with partner    Intimate partner violence     Fear of current or ex partner: No     Emotionally abused: No     Physically abused: No     Forced sexual activity: No   Other Topics Concern    Not on file   Social History Narrative    Grew up in [place]     Lives With:  [id number] Spouse    Type of Home: House    Home Layout: Two level, Performs ADL's on one level    Home Access: Stairs to enter with rails    Entrance Stairs - Number of Steps: 4    Bathroom Shower/Tub: Tub/Shower unit, Doors    Bathroom Equipment: Shower chair, Grab bars in [facility]: Rolling walker, Cane, Oxygen(uses her O2 very seldom)    ADL Assistance: Needs assistance    Homemaking Assistance: Needs assistance    Homemaking Responsibilities: No(spouse performs)    Ambulation Assistance: Independent    Transfer Assistance: Independent    Active : No    Occupation: Retired    Type of occupation: worked in [employer]: patient enjoys [employer]       Allergies   Allergen Reactions    Ibuprofen Nausea Only    Metformin And Related      Diarrhea      Darvon [Propoxyphene Hcl] Nausea And Vomiting       Current Outpatient Medications on File Prior to Visit   Medication Sig Dispense Refill    amoxicillin (AMOXIL) 250 MG capsule Take 1 capsule by mouth 3 times daily for 5 days 15 capsule 0    carvedilol (COREG) 25 MG tablet Take 2 tablets by mouth 2 times daily 120 tablet 3    aspirin 81 MG EC tablet Take 1 tablet by mouth daily 30 tablet 3    levETIRAcetam (KEPPRA) 500 MG tablet Take 1 tablet by mouth 2 times daily 60 tablet 3    insulin lispro (HUMALOG) 100 UNIT/ML injection vial 18 units at each mels 1 vial 3    insulin glargine (LANTUS) 100 UNIT/ML injection vial 60 units at bedtime 3 vial 3    verapamil (CALAN SR) 120 MG extended release tablet Take 1 tablet by mouth daily 90 tablet 3    cloNIDine (CATAPRES) 0.2 MG tablet Take 1 tablet by mouth 2 times daily 180 tablet 3    rosuvastatin (CRESTOR) 40 MG tablet TAKE 1 TABLET BY MOUTH ONCE DAILY IN THE EVENING 90 tablet 0    PARoxetine (PAXIL) 40 MG tablet TAKE 1 TABLET BY MOUTH ONCE DAILY IN THE MORNING 90 tablet 1    ACCU-CHEK PHYLLIS PLUS strip Test 3x daily Dx E11.65 100 each 3    Accu-Chek Softclix Lancets MISC bid 100 each 3    Blood Glucose Monitoring Suppl (ACCU-CHEK PHYLLIS CONNECT) w/Device KIT 1 kit by Does not apply route daily 1 kit 0    blood glucose test strips (ACCU-CHEK PHYLLIS PLUS) strip Patient test 3x daily E65.11 100 each 3    ipratropium-albuterol (DUONEB) 0.5-2.5 (3) MG/3ML SOLN nebulizer solution Inhale 3 mLs into the lungs 3 times daily 360 mL 0    Continuous Blood Gluc Sensor (FREESTYLE MURALI 14 DAY SENSOR) [facility] Use every 2 weeks 2 each 1    Continuous Blood Gluc  (FREESTYLE MURALI 14 DAY READER) [place] As directed 1 Device 0    pantoprazole (PROTONIX) 40 MG tablet Take 40mg twice daily (1st dose 30min before breakfast) for 30 days. After 30 days, you make take 40mg once daily before breakfast. 60 tablet 3    dicyclomine (BENTYL) 10 MG capsule Take 1 capsule by mouth 4 times daily as needed (abdominal pain) 120 capsule 3    ondansetron (ZOFRAN) 4 MG tablet Take 1 tablet by mouth every 8 hours as needed for Nausea or Vomiting 30 tablet 2     No current facility-administered medications on file prior to visit. Review of Systems   Constitutional: Positive for appetite change (fair), fatigue and unexpected weight change. Negative for activity change, chills and fever (weight gain). HENT: Positive for congestion. Negative for sore throat and trouble swallowing. Ear discharge: chest and nasal.    Eyes: Negative.     Respiratory: Positive for cough, shortness of breath and wheezing. Cardiovascular: Negative for chest pain, palpitations and leg swelling. Gastrointestinal: Positive for abdominal pain (RLQ). Negative for abdominal distention, constipation, diarrhea, nausea and vomiting. Endocrine: Negative. Genitourinary: Negative. Negative for dysuria, frequency, hematuria and urgency. Musculoskeletal: Negative. Skin: Negative. Neurological: Positive for dizziness. Negative for light-headedness and headaches. Hematological: Negative. Psychiatric/Behavioral: Negative. OBJECTIVE:  /70   Pulse 70   Temp 97.7 °F (36.5 °C)   Wt 140 lb (63.5 kg)   LMP  (LMP Unknown)   BMI 28.28 kg/m²     Physical Exam  Constitutional:       Appearance: Normal appearance. HENT:      Head: Normocephalic and atraumatic. Nose: Nose normal.   Neck:      Musculoskeletal: Neck supple. Cardiovascular:      Rate and Rhythm: Normal rate and regular rhythm. Heart sounds: Normal heart sounds. Pulmonary:      Effort: Pulmonary effort is normal.      Breath sounds: Examination of the right-lower field reveals decreased breath sounds. Examination of the left-lower field reveals decreased breath sounds. Decreased breath sounds present. Abdominal:      Palpations: Abdomen is soft. Musculoskeletal: Normal range of motion. Right lower leg: No edema. Left lower leg: No edema. Skin:     General: Skin is warm and dry. Capillary Refill: Capillary refill takes 2 to 3 seconds. Neurological:      Mental Status: She is alert and oriented to person, place, and time. Psychiatric:         Mood and Affect: Mood normal.         Behavior: Behavior normal.       ASSESSMENT AND PLAN:  Chart reviewed of pertinent information. Lab work and medications reviewed. Assessment:   1. Poor venous access: Oncology requesting Mediport placement for IV blood and iron infusions  2. Iron deficiency anemia secondary to blood loss. PLAN:  1.   Percutaneous Flouroscopy guided placement of mediport with conscious sedation. Procedure with risks including infection, bleeding, pain at site, pneumothorax, malposition of catheter, damage to major surrounding vessels and/or organs, venous thrombosis discussed with patient and she wishes to proceed. 2.  Follow up one week post placement in IR clinic for post op and incisional check    Thank You Dr. Yari Perez for referral of your patient to our practice for care.      Richard Phillips, JOHNATHON - CNP

## 2021-05-07 ENCOUNTER — PREP FOR PROCEDURE (OUTPATIENT)
Dept: INTERVENTIONAL RADIOLOGY/VASCULAR | Age: 76
End: 2021-05-07

## 2021-05-07 RX ORDER — 0.9 % SODIUM CHLORIDE 0.9 %
250 INTRAVENOUS SOLUTION INTRAVENOUS
Status: CANCELLED | OUTPATIENT
Start: 2021-05-07

## 2021-05-07 RX ORDER — LIDOCAINE HYDROCHLORIDE AND EPINEPHRINE BITARTRATE 20; .01 MG/ML; MG/ML
20 INJECTION, SOLUTION SUBCUTANEOUS ONCE
Status: CANCELLED | OUTPATIENT
Start: 2021-05-07 | End: 2021-05-07

## 2021-05-07 RX ORDER — 0.9 % SODIUM CHLORIDE 0.9 %
1000 INTRAVENOUS SOLUTION INTRAVENOUS
Status: CANCELLED | OUTPATIENT
Start: 2021-05-07

## 2021-05-07 RX ORDER — FENTANYL CITRATE 50 UG/ML
50 INJECTION, SOLUTION INTRAMUSCULAR; INTRAVENOUS
Status: CANCELLED | OUTPATIENT
Start: 2021-05-07

## 2021-05-07 RX ORDER — MIDAZOLAM HYDROCHLORIDE 1 MG/ML
0.5 INJECTION INTRAMUSCULAR; INTRAVENOUS
Status: CANCELLED | OUTPATIENT
Start: 2021-05-07

## 2021-05-07 RX ORDER — HEPARIN SODIUM (PORCINE) LOCK FLUSH IV SOLN 100 UNIT/ML 100 UNIT/ML
100 SOLUTION INTRAVENOUS ONCE
Status: CANCELLED | OUTPATIENT
Start: 2021-05-07 | End: 2021-05-07

## 2021-05-10 RX ORDER — IPRATROPIUM BROMIDE AND ALBUTEROL SULFATE 2.5; .5 MG/3ML; MG/3ML
3 SOLUTION RESPIRATORY (INHALATION) 3 TIMES DAILY
Qty: 360 ML | Refills: 0 | Status: SHIPPED | OUTPATIENT
Start: 2021-05-10 | End: 2021-10-13 | Stop reason: SDUPTHER

## 2021-05-10 NOTE — TELEPHONE ENCOUNTER
Paty Veliz is calling in requesting a refill on medication(s):    Requested Prescriptions     Pending Prescriptions Disp Refills    ipratropium-albuterol (DUONEB) 0.5-2.5 (3) MG/3ML SOLN nebulizer solution 360 mL 0     Sig: Inhale 3 mLs into the lungs 3 times daily          Patient's Last Office Visit:  3/16/2021     Patient's Next Visit:  Future Appointments   Date Time Provider Zaid Lr   5/12/2021 10:00 AM Conrado Walker MD [facility] [place]   5/14/2021  8:45 AM [place] SPECIAL PROCEDURES ROOM 1 OZ SP PROC [facility] Fac RAD   5/14/2021  9:00 AM [facility] CATH LAB RM [address] [facility] [address]   5/25/2021  1:00 PM [address] [facility], APRN - [facility] [id number] [facility]   6/1/2021  2:30 PM Vaughn Blakely MD [address]:  Please send the medication to the pharmacy listed. Other Comments:  Please advise.

## 2021-05-12 ENCOUNTER — OFFICE VISIT (OUTPATIENT)
Dept: FAMILY MEDICINE CLINIC | Age: 76
End: 2021-05-12
Payer: MEDICARE

## 2021-05-12 VITALS
DIASTOLIC BLOOD PRESSURE: 60 MMHG | HEART RATE: 69 BPM | OXYGEN SATURATION: 100 % | WEIGHT: 141 LBS | HEIGHT: 59 IN | SYSTOLIC BLOOD PRESSURE: 122 MMHG | BODY MASS INDEX: 28.43 KG/M2 | TEMPERATURE: 98.1 F

## 2021-05-12 DIAGNOSIS — N39.0 URINARY TRACT INFECTION WITHOUT HEMATURIA, SITE UNSPECIFIED: ICD-10-CM

## 2021-05-12 DIAGNOSIS — K92.2 GASTROINTESTINAL HEMORRHAGE, UNSPECIFIED GASTROINTESTINAL HEMORRHAGE TYPE: Primary | ICD-10-CM

## 2021-05-12 DIAGNOSIS — K92.2 GASTROINTESTINAL HEMORRHAGE, UNSPECIFIED GASTROINTESTINAL HEMORRHAGE TYPE: ICD-10-CM

## 2021-05-12 LAB
BASOPHILS ABSOLUTE: 0.1 K/UL (ref 0–0.2)
BASOPHILS RELATIVE PERCENT: 0.9 %
EOSINOPHILS ABSOLUTE: 0.4 K/UL (ref 0–0.7)
EOSINOPHILS RELATIVE PERCENT: 5.7 %
HCT VFR BLD CALC: 33.7 % (ref 37–47)
HEMOGLOBIN: 10.5 G/DL (ref 12–16)
LYMPHOCYTES ABSOLUTE: 1.1 K/UL (ref 1–4.8)
LYMPHOCYTES RELATIVE PERCENT: 15.5 %
MCH RBC QN AUTO: 27.3 PG (ref 27–31.3)
MCHC RBC AUTO-ENTMCNC: 31.3 % (ref 33–37)
MCV RBC AUTO: 87.3 FL (ref 82–100)
MONOCYTES ABSOLUTE: 0.5 K/UL (ref 0.2–0.8)
MONOCYTES RELATIVE PERCENT: 7.6 %
NEUTROPHILS ABSOLUTE: 5 K/UL (ref 1.4–6.5)
NEUTROPHILS RELATIVE PERCENT: 70.3 %
PDW BLD-RTO: 17.5 % (ref 11.5–14.5)
PLATELET # BLD: 331 K/UL (ref 130–400)
RBC # BLD: 3.87 M/UL (ref 4.2–5.4)
WBC # BLD: 7.1 K/UL (ref 4.8–10.8)

## 2021-05-12 PROCEDURE — 1111F DSCHRG MED/CURRENT MED MERGE: CPT | Performed by: FAMILY MEDICINE

## 2021-05-12 PROCEDURE — 99495 TRANSJ CARE MGMT MOD F2F 14D: CPT | Performed by: FAMILY MEDICINE

## 2021-05-12 RX ORDER — GUAIFENESIN 600 MG/1
600 TABLET, EXTENDED RELEASE ORAL 2 TIMES DAILY PRN
Qty: 60 TABLET | Refills: 2 | Status: SHIPPED | OUTPATIENT
Start: 2021-05-12 | End: 2021-06-11

## 2021-05-12 RX ORDER — BLOOD SUGAR DIAGNOSTIC
STRIP MISCELLANEOUS
Qty: 100 EACH | Refills: 3 | Status: SHIPPED | OUTPATIENT
Start: 2021-05-12 | End: 2021-06-24 | Stop reason: SDUPTHER

## 2021-05-12 NOTE — PROGRESS NOTES
Post-Discharge Transitional Care Management Services or Hospital Follow Up      Ramiro Weathers   YOB: 1945    Date of Office Visit:  5/12/2021  Date of Hospital Admission: 4/29/21  Date of Hospital Discharge: 4/30/21  Risk of hospital readmission (high >=14%.  Medium >=10%) :Readmission Risk Score: 29      Care management risk score Rising risk (score 2-5) and Complex Care (Scores >=6): 5     Non face to face  following discharge, date last encounter closed (first attempt may have been earlier): 5/3/2021  2:41 PM    Call initiated 2 business days of discharge: Yes    Patient Active Problem List   Diagnosis    Hypertension    Hyperlipidemia    Uncontrolled type 2 diabetes mellitus with complication, without long-term current use of insulin (HCC)    Fibromyalgia    Anxiety    Smoker    Insomnia    DJD (degenerative joint disease), lumbar    Lumbosacral radiculopathy at S1    DDD (degenerative disc disease), lumbar    Dysphonia    CTS (carpal tunnel syndrome)    High risk medication use-Norco - 12/20/17 O[facility] PM&R, 02/20/18 OARRS PM&R, 03/07/18 OARRS PM&R, Urine Drug screen negative 02/06/17 PM&R--MED CONTRACT 2/6/17    SOB (shortness of breath) on exertion    Chest pain    Memory deficit    Artificial lens present    Presbyopia    Astigmatism, regular    Cataract, nuclear sclerotic senile    IDDM (insulin dependent diabetes mellitus)    Regular astigmatism    Nuclear senile cataract    Neck pain    Lumbosacral radiculopathy at L5    Spinal stenosis of lumbar region with neurogenic claudication    Impaired mobility and activities of daily living    Non-compliant patient NO showed FU 1/10/17 Dr Katie Moreno Insomnia secondary to chronic pain    Reactive depression    Diabetic radiculopathy (HCC)    Cervical radicular pain    Diabetic asymmetric polyneuropathy (HCC)    Myalgia    Intercostal neuropathy    Osteoarthritis of spine with radiculopathy, lumbar region    Acute combined systolic and diastolic CHF, NYHA class 1 (HCC)    PMB (postmenopausal bleeding)    Acute on chronic diastolic heart failure (HCC)    CHF (congestive heart failure) ([facility])    Hypertensive urgency    Uncontrolled type 2 diabetes mellitus with hyperglycemia (HCC)    Chronic obstructive pulmonary disease with acute exacerbation (HCC)    Acute on chronic diastolic (congestive) heart failure (HCC)    SAÚL (acute kidney injury) ([address]    Hypoglycemia    HOCM (hypertrophic obstructive cardiomyopathy) ([facility])    Gastrointestinal hemorrhage    COPD exacerbation (HCC)    Anemia    AVM (arteriovenous malformation)    Symptomatic anemia    Flash pulmonary edema (HCC)    Acute on chronic kidney failure (HCC)    Dysarthria    Chronic fatigue    Encephalopathy acute    Adenomatous polyp of sigmoid colon    Adenomatous polyp of transverse colon    Sepsis (HCC)    Major depressive disorder in remission ([address]    Gastroesophageal reflux disease without esophagitis    Acute cystitis without hematuria    CKD (chronic kidney disease) stage 4, GFR 15-29 ml/min ([facility])    Cardiopulmonary arrest ([facility])    Gait abnormality    Late effects of CVA (cerebrovascular accident)    Cardiac arrest ([address]    Acute respiratory failure with hypoxia ([facility])    Goals of care, counseling/discussion    Bradycardia    Moderate hypoxic-ischemic encephalopathy    Infection due to ESBL-producing Escherichia coli    GI bleed       Allergies   Allergen Reactions    Ibuprofen Nausea Only    Metformin And Related      Diarrhea      Darvon [Propoxyphene Hcl] Nausea And Vomiting       Medications listed as ordered at the time of discharge from [facility], [address] [place] [address] Medication Instructions [place]IL[place]:    Printed on:05/12/21 1143   Medication Information                      ACCU-CHEK PHYLLIS PLUS strip  Test 3x daily Dx E11.65             Accu-Chek Softclix Lancets MISC  bid             aspirin 81 MG EC tablet  Take 1 tablet by mouth daily             Blood Glucose Monitoring Suppl (ACCU-CHEK PHYLLIS CONNECT) w/Device KIT  1 kit by Does not apply route daily             carvedilol (COREG) 25 MG tablet  Take 2 tablets by mouth 2 times daily             cloNIDine (CATAPRES) 0.2 MG tablet  Take 1 tablet by mouth 2 times daily             Continuous Blood Gluc  (FREESTYLE MURALI 14 DAY READER) [place]  As directed             Continuous Blood Gluc Sensor (FREESTYLE MURALI 14 DAY SENSOR) MISC  Use every 2 weeks             dicyclomine (BENTYL) 10 MG capsule  Take 1 capsule by mouth 4 times daily as needed (abdominal pain)             guaiFENesin (MUCINEX) 600 MG extended release tablet  Take 1 tablet by mouth 2 times daily as needed for Congestion             insulin glargine (LANTUS) 100 UNIT/ML injection vial  60 units at bedtime             insulin lispro (HUMALOG) 100 UNIT/ML injection vial  18 units at each mels             ipratropium-albuterol (DUONEB) 0.5-2.5 (3) MG/3ML SOLN nebulizer solution  Inhale 3 mLs into the lungs 3 times daily             levETIRAcetam (KEPPRA) 500 MG tablet  Take 1 tablet by mouth 2 times daily             ondansetron (ZOFRAN) 4 MG tablet  Take 1 tablet by mouth every 8 hours as needed for Nausea or Vomiting             pantoprazole (PROTONIX) 40 MG tablet  Take 40mg twice daily (1st dose 30min before breakfast) for 30 days.  After 30 days, you make take 40mg once daily before breakfast.             PARoxetine (PAXIL) 40 MG tablet  TAKE 1 TABLET BY MOUTH ONCE DAILY IN THE MORNING             rosuvastatin (CRESTOR) 40 MG tablet  TAKE 1 TABLET BY MOUTH ONCE DAILY IN THE EVENING             verapamil (CALAN SR) 120 MG extended release tablet  Take 1 tablet by mouth daily                   Medications marked \"taking\" at this time  Outpatient Medications Marked as Taking for the 5/12/21 encounter (Office Visit) with Gloria Rodrigues MD   Medication Sig Dispense Refill    ACCU-CHEK PHYLLIS PLUS strip Test 3x daily Dx E11.65 100 each 3    guaiFENesin (MUCINEX) 600 MG extended release tablet Take 1 tablet by mouth 2 times daily as needed for Congestion 60 tablet 2    ipratropium-albuterol (DUONEB) 0.5-2.5 (3) MG/3ML SOLN nebulizer solution Inhale 3 mLs into the lungs 3 times daily 360 mL 0    carvedilol (COREG) 25 MG tablet Take 2 tablets by mouth 2 times daily 120 tablet 3    levETIRAcetam (KEPPRA) 500 MG tablet Take 1 tablet by mouth 2 times daily 60 tablet 3    insulin lispro (HUMALOG) 100 UNIT/ML injection vial 18 units at each mels 1 vial 3    insulin glargine (LANTUS) 100 UNIT/ML injection vial 60 units at bedtime 3 vial 3    verapamil (CALAN SR) 120 MG extended release tablet Take 1 tablet by mouth daily 90 tablet 3    rosuvastatin (CRESTOR) 40 MG tablet TAKE 1 TABLET BY MOUTH ONCE DAILY IN THE EVENING 90 tablet 0    PARoxetine (PAXIL) 40 MG tablet TAKE 1 TABLET BY MOUTH ONCE DAILY IN THE MORNING 90 tablet 1    Accu-Chek Softclix Lancets MISC bid 100 each 3    Blood Glucose Monitoring Suppl (ACCU-CHEK PHYLLIS CONNECT) w/Device KIT 1 kit by Does not apply route daily 1 kit 0    Continuous Blood Gluc Sensor (FREESTYLE MURALI 14 DAY SENSOR) MISC Use every 2 weeks 2 each 1    Continuous Blood Gluc  (FREESTYLE MURALI 14 DAY READER) [place] As directed 1 Device 0        Medications patient taking as of now reconciled against medications ordered at time of hospital discharge: Yes    Chief Complaint   Patient presents with    Follow-Up from Hospital     Admitted 4/29/21 & D/C home 4/30/21    Hypertension     Checks BP at home. Has log with her today.  Diabetes     Checks blood sugar 2-3 times a day.  Hyperlipidemia    Anxiety    Depression    Shortness of Breath     Has been having increase in SOB.         History of Present illness - Follow up of Hospital diagnosis(es): \\      This is the discharge note from the hospital.      Poonam Rueda is a 67 y.o. female that was admitted and treated at [facility] for increasing lethargy and fatigue in the setting of chronic GI bleed. Patient has had multiple endoscopic evaluations in the past including a PillCam which showed some bleeding throughout the distal small intestine. She has been treated also has advancing CKD 3 which is likely contributing to her anemia at some point as well. She was recently admitted to [facility] with cardiac arrest and has been successfully discharged home. At this point in time she was transfused to a hemoglobin of 9.1 and she will be discharged with hematology follow-up. PICC line was placed as no one was able to place an IV in her while on admission this will be removed prior to discharge. She will follow up with hematology for hospital erythropoietin versus iron versus blood transfusions as needed. She also follows with nephrology due to her CKD. Of note the patient did have mild Trop elevation on arrival which is chronic and baseline and troponin's have been flat with no signs of acute coronary events. Inpatient course: Discharge summary reviewed- see chart. Interval history/Current status:     Patient was seen by hematology. Marybel was told that it was too soon for patient to have any iron transfusion/blood transfusions since she just had one in the hospital. Patient was to have blood work done in may and then repeated 2 weeks afterwards. Family is concerned because  Patient has a history where her blood count drops every month due to the chronic bleed in her GI system and she needs to be admitted to the hospital.family would like to see if there is a way where her blood count can be checked monthly or where she can be evaluated more frequently through other measures. Per , patient was seen by palliative care last week.   From their understanding, they would resume all care and will not need any help from the specialist.  Family wants all of the care teams involved. Patient denies any fevers, chills, nausea, vomiting, chest pain, change in her shortness of breath, change in her stools. Patient was recently treated for urinary tract infection by her primary care provider. At that time patient was given antibiotics and was told to have a repeat urinary culture for further evaluation. Patient also has chronic abdominal pain that was seen and had a CT scan in the hospital.  Patient is a follow-up with her hematologist in June. A comprehensive review of systems was negative except for what was noted in the HPI. Vitals:    05/12/21 1000   BP: 122/60   Pulse: 69   Temp: 98.1 °F (36.7 °C)   SpO2: 100%   Weight: 141 lb (64 kg)   Height: 4' 11\" (1.499 m)     Body mass index is 28.48 kg/m². Wt Readings from Last 3 Encounters:   05/12/21 141 lb (64 kg)   05/06/21 140 lb (63.5 kg)   04/29/21 140 lb (63.5 kg)     BP Readings from Last 3 Encounters:   05/12/21 122/60   05/06/21 121/70   04/30/21 (!) 172/82        Physical Exam:  General Appearance: alert and oriented to person, place and time, well developed and well- nourished, in no acute distress, in wheelchair  Skin: warm and dry, no rash or erythema  Head: normocephalic and atraumatic  Eyes: pupils equal, round, and reactive to light, extraocular eye movements intact, conjunctivae normal  ENT: tympanic membrane, external ear and ear canal normal bilaterally, nose without deformity, nasal mucosa and turbinates normal without polyps  Neck: supple and non-tender without mass, no thyromegaly or thyroid nodules, no cervical lymphadenopathy  Pulmonary/Chest: Distant heard. Cardiovascular: normal rate, regular rhythm, normal S1 and S2, no murmurs, rubs, clicks, or gallops, distal pulses intact, no carotid bruits  Abdomen: Tenderness palpation on the right side of the abdomen.   Extremities: no cyanosis, clubbing or edema  Musculoskeletal: normal range of motion, no joint swelling, deformity or tenderness  Neurologic: reflexes normal and symmetric, no cranial nerve deficit, gait, coordination and speech normal    Assessment/Plan:  1. Gastrointestinal hemorrhage, unspecified gastrointestinal hemorrhage type  Patient has a chronic GI bleed. Repeat CBC. Release records to my chart for patient to send to her hematologist.  Patient had a recent blood transfusion making her not a candidate for any iron transfusions at this time. Patient does have a follow-up with her hematologist soon. Advised patient and family to keep the appointment. I also encourage patient to follow-up with palliative care. There should be options for patient to have palliative care and still have her specialist be involved. Will speak to care coordination regards to this. - LA DISCHARGE MEDS RECONCILED W/ CURRENT OUTPATIENT MED LIST  - CBC With Auto Differential; Future    2. Urinary tract infection without hematuria, site unspecified    - Culture, Urine;  Future        Medical Decision Making: moderate complexity

## 2021-05-13 ENCOUNTER — CARE COORDINATION (OUTPATIENT)
Dept: CARE COORDINATION | Age: 76
End: 2021-05-13

## 2021-05-13 NOTE — CARE COORDINATION
Call placed to patient/family to discuss services/community resources available at the request of Dr. Cirilo Jang. Message left regarding purpose of my call. Requested call back. Provided call back number.

## 2021-05-14 ENCOUNTER — HOSPITAL ENCOUNTER (OUTPATIENT)
Dept: INTERVENTIONAL RADIOLOGY/VASCULAR | Age: 76
Discharge: HOME | End: 2021-05-16
Payer: MEDICARE

## 2021-05-14 ENCOUNTER — CARE COORDINATION (OUTPATIENT)
Dept: CARE COORDINATION | Age: 76
End: 2021-05-14

## 2021-05-14 VITALS
HEART RATE: 64 BPM | DIASTOLIC BLOOD PRESSURE: 56 MMHG | OXYGEN SATURATION: 96 % | HEIGHT: 59 IN | RESPIRATION RATE: 15 BRPM | WEIGHT: 141 LBS | SYSTOLIC BLOOD PRESSURE: 112 MMHG | BODY MASS INDEX: 28.43 KG/M2

## 2021-05-14 DIAGNOSIS — K62.5 RECTAL BLEEDING: ICD-10-CM

## 2021-05-14 DIAGNOSIS — I50.9 CONGESTIVE HEART FAILURE, UNSPECIFIED HF CHRONICITY, UNSPECIFIED HEART FAILURE TYPE (HCC): ICD-10-CM

## 2021-05-14 DIAGNOSIS — N18.30 STAGE 3 CHRONIC KIDNEY DISEASE, UNSPECIFIED WHETHER STAGE 3A OR 3B CKD (HCC): ICD-10-CM

## 2021-05-14 DIAGNOSIS — J44.9 CHRONIC OBSTRUCTIVE PULMONARY DISEASE, UNSPECIFIED COPD TYPE (HCC): ICD-10-CM

## 2021-05-14 DIAGNOSIS — R30.0 DYSURIA: ICD-10-CM

## 2021-05-14 DIAGNOSIS — D64.9 CHRONIC ANEMIA: ICD-10-CM

## 2021-05-14 DIAGNOSIS — D50.0 IRON DEFICIENCY ANEMIA DUE TO CHRONIC BLOOD LOSS: ICD-10-CM

## 2021-05-14 DIAGNOSIS — N39.0 URINARY TRACT INFECTION WITHOUT HEMATURIA, SITE UNSPECIFIED: ICD-10-CM

## 2021-05-14 DIAGNOSIS — I46.9 CARDIAC ARREST (HCC): ICD-10-CM

## 2021-05-14 DIAGNOSIS — K92.2 GASTROINTESTINAL HEMORRHAGE, UNSPECIFIED GASTROINTESTINAL HEMORRHAGE TYPE: Primary | ICD-10-CM

## 2021-05-14 DIAGNOSIS — Q27.30 AVM (ARTERIOVENOUS MALFORMATION): ICD-10-CM

## 2021-05-14 DIAGNOSIS — R53.83 FATIGUE, UNSPECIFIED TYPE: ICD-10-CM

## 2021-05-14 DIAGNOSIS — D50.0 IRON DEFICIENCY ANEMIA SECONDARY TO BLOOD LOSS (CHRONIC): ICD-10-CM

## 2021-05-14 DIAGNOSIS — I42.1 HOCM (HYPERTROPHIC OBSTRUCTIVE CARDIOMYOPATHY) (HCC): ICD-10-CM

## 2021-05-14 DIAGNOSIS — R53.1 GENERALIZED WEAKNESS: ICD-10-CM

## 2021-05-14 DIAGNOSIS — I87.8 POOR VENOUS ACCESS: ICD-10-CM

## 2021-05-14 PROCEDURE — 36561 INSERT TUNNELED CV CATH: CPT | Performed by: RADIOLOGY

## 2021-05-14 PROCEDURE — 6360000002 HC RX W HCPCS

## 2021-05-14 PROCEDURE — 2580000003 HC RX 258: Performed by: NURSE PRACTITIONER

## 2021-05-14 PROCEDURE — 76942 ECHO GUIDE FOR BIOPSY: CPT | Performed by: RADIOLOGY

## 2021-05-14 PROCEDURE — 6360000002 HC RX W HCPCS: Performed by: NURSE PRACTITIONER

## 2021-05-14 PROCEDURE — 76937 US GUIDE VASCULAR ACCESS: CPT | Performed by: RADIOLOGY

## 2021-05-14 PROCEDURE — 77001 FLUOROGUIDE FOR VEIN DEVICE: CPT | Performed by: RADIOLOGY

## 2021-05-14 PROCEDURE — 6370000000 HC RX 637 (ALT 250 FOR IP): Performed by: NURSE PRACTITIONER

## 2021-05-14 PROCEDURE — 2709999900 IR PORT PLACEMENT > 5 YEARS

## 2021-05-14 PROCEDURE — 2500000003 HC RX 250 WO HCPCS: Performed by: NURSE PRACTITIONER

## 2021-05-14 RX ORDER — 0.9 % SODIUM CHLORIDE 0.9 %
1000 INTRAVENOUS SOLUTION INTRAVENOUS
Status: DISCONTINUED | OUTPATIENT
Start: 2021-05-14 | End: 2021-05-17 | Stop reason: HOSPADM

## 2021-05-14 RX ORDER — LIDOCAINE HYDROCHLORIDE AND EPINEPHRINE BITARTRATE 20; .01 MG/ML; MG/ML
20 INJECTION, SOLUTION SUBCUTANEOUS ONCE
Status: COMPLETED | OUTPATIENT
Start: 2021-05-14 | End: 2021-05-14

## 2021-05-14 RX ORDER — 0.9 % SODIUM CHLORIDE 0.9 %
250 INTRAVENOUS SOLUTION INTRAVENOUS
Status: DISCONTINUED | OUTPATIENT
Start: 2021-05-14 | End: 2021-05-17 | Stop reason: HOSPADM

## 2021-05-14 RX ORDER — FENTANYL CITRATE 50 UG/ML
50 INJECTION, SOLUTION INTRAMUSCULAR; INTRAVENOUS
Status: DISCONTINUED | OUTPATIENT
Start: 2021-05-14 | End: 2021-05-17 | Stop reason: HOSPADM

## 2021-05-14 RX ORDER — MIDAZOLAM HYDROCHLORIDE 2 MG/2ML
0.5 INJECTION, SOLUTION INTRAMUSCULAR; INTRAVENOUS
Status: DISCONTINUED | OUTPATIENT
Start: 2021-05-14 | End: 2021-05-17 | Stop reason: HOSPADM

## 2021-05-14 RX ORDER — HEPARIN SODIUM (PORCINE) LOCK FLUSH IV SOLN 100 UNIT/ML 100 UNIT/ML
100 SOLUTION INTRAVENOUS ONCE
Status: COMPLETED | OUTPATIENT
Start: 2021-05-14 | End: 2021-05-14

## 2021-05-14 RX ORDER — AMOXICILLIN 500 MG/1
500 CAPSULE ORAL EVERY 12 HOURS
Qty: 14 CAPSULE | Refills: 0 | Status: SHIPPED | OUTPATIENT
Start: 2021-05-14 | End: 2021-05-16

## 2021-05-14 RX ORDER — CEFAZOLIN SODIUM 2 G/50ML
2000 SOLUTION INTRAVENOUS ONCE
Status: DISCONTINUED | OUTPATIENT
Start: 2021-05-14 | End: 2021-05-14

## 2021-05-14 RX ADMIN — FENTANYL CITRATE 50 MCG: 50 INJECTION, SOLUTION INTRAMUSCULAR; INTRAVENOUS at 09:53

## 2021-05-14 RX ADMIN — Medication 1 STICK: at 10:23

## 2021-05-14 RX ADMIN — LIDOCAINE HYDROCHLORIDE AND EPINEPHRINE 13 ML: 20; 10 INJECTION, SOLUTION INFILTRATION; PERINEURAL at 09:50

## 2021-05-14 RX ADMIN — CEFAZOLIN 1000 MG: 1 INJECTION, POWDER, FOR SOLUTION INTRAMUSCULAR; INTRAVENOUS at 09:01

## 2021-05-14 RX ADMIN — CEFAZOLIN 1000 MG: 1 INJECTION, POWDER, FOR SOLUTION INTRAMUSCULAR; INTRAVENOUS at 09:24

## 2021-05-14 RX ADMIN — FENTANYL CITRATE 50 MCG: 50 INJECTION, SOLUTION INTRAMUSCULAR; INTRAVENOUS at 09:40

## 2021-05-14 RX ADMIN — LIDOCAINE HYDROCHLORIDE AND EPINEPHRINE 20 ML: 20; 10 INJECTION, SOLUTION INFILTRATION; PERINEURAL at 10:03

## 2021-05-14 RX ADMIN — MIDAZOLAM HYDROCHLORIDE 0.5 MG: 1 INJECTION, SOLUTION INTRAMUSCULAR; INTRAVENOUS at 09:53

## 2021-05-14 RX ADMIN — MIDAZOLAM HYDROCHLORIDE 0.5 MG: 1 INJECTION, SOLUTION INTRAMUSCULAR; INTRAVENOUS at 09:40

## 2021-05-14 RX ADMIN — HEPARIN SODIUM (PORCINE) LOCK FLUSH IV SOLN 100 UNIT/ML 500 UNITS: 100 SOLUTION at 10:17

## 2021-05-14 NOTE — LETTER
5/14/2021    Shon Lafleur  [address] [place] [id number]      Dear Shon Lafleur,    My name is Corey Patel and I am a registered nurse who partners with Yoshi Bender MD to improve patients' health. Yoshi Bender MD believes you would benefit from working with me. As a member of your health care team, I would work with other providers involved in your care, offer education for your specific health conditions, and connect you with additional resources as needed. I will collaborate with Yoshi Bender MD to support you in following your treatment plan. The additional support I provide is no additional cost to you. My primary focus is to help you achieve specific goals and improve your health. We are committed to walk with you on this journey and look forward to working with you. Please call me to further discuss your healthcare needs. I am available by phone or for appointments at the office. You can reach me at [phone].     In good health,     Corey Patel RN

## 2021-05-14 NOTE — CARE COORDINATION
Received call back from patient's . Introduced myself and Care Coordination program. Caregiver declined Care Coordination services. He did have questions regarding Palliative Care. Provided him with a brief overview of program. He states he was informed that patient could not continue to see PCP with Palliative Care. Reassured him that patient can keep her current providers and receive Palliative Care Services. Informed him that what he was describing is the requirements of [facility]. Explained that when signing up with [facility], one is changing PCP to the visiting physician. Patients are not able to see both as it would be duplication of services.  is interested in Palliative Care. Discussed speaking with Dr. Kylie Lewis and/or Makenzie Horton MD for referral. Informed  he may contact me with any additional questions.

## 2021-05-14 NOTE — PROGRESS NOTES
0745 Pt to CT holding room via w/c. Pt changed into hospital gown with help of her daughter. Pt A &Ox4, skin warm and dry, respirations even and unlabored. Medications, history and allergies reviewed. Pt denies any pain. Pt states she only took am meds with sips of water, last meal was last night. 8205 VSS. NSR on the monitor. 0694 #22 started in Right hand by MARYBETH. Awaiting Dr Pierce Newell to come see the pt.     7181 Dr Pierce Newell at bedside, speaking with pt and pt's . Procedure explained, questions answered. 4790 Consent signed by pt's  who is POA.    4894 Pt taken to cath lab 3# via cart.

## 2021-05-14 NOTE — PROGRESS NOTES
Patient to CT holding room. Patient awake and alert. Talkative with staff. Vital signs stable. 1045 Patient remains in holding room. Right chest area and right neck dermabond in place. Mediport and neck site are clean,dry,intact. No complaints of pain. Vitals stable.

## 2021-05-14 NOTE — PROGRESS NOTES
[employer] Formulary approved substitution Ancef 2 gm for Ancef 1 gm for surgical prophylaxis per hospital surgery protocol for adults pts < 120 kg    Per Mary Lou Standard do not change to Ancef 2 gm despite policy (she has discussed this with Kaylyn Melgar?)

## 2021-05-14 NOTE — SEDATION DOCUMENTATION
SEDATION GIVEN      0924 Pt brought to Cath lab room 3  via cart. Pt transferred with assistance onto procedure table in supine position. Connected to monitors and O2 at 2L NC/end tidal CO2. Pt awake, alert. 0261 tech scanned right IJ to assess patency of area for mediport placement and then prepped right neck and chest with large tinted chloraprep and draped using sterile technique. 1472 Dr. Cherelle Clarke arrived. Timeout completed for IR placement of Mediport. Sedation given as ordered. 0275 Additional sedation medications given as ordered for procedure. 0131 Using ultrasound guidance, Dr. Cherelle Clarke numbed site with 2% lidocaine with epinephrine, see eMar.  Using ultrasound guidance, Dr. Cherelle Clarke obtained right IJ access with 5 F MP Introducer kit.     1940 Site dilated and guidewire from mySBX used to maintain access. 1182 Using fluoro guidance, Dr. Cherelle Clarke introduced 8 F sheath from mySBX into right IJ. Dr. Cherelle Clarke marked site for mediport pocket and tunnel and additional lidocaine given at sites. Pt tolerating well.     [id number] Dr. Cherelle Clarke made right chest incision creating the opening of mediport pocket. Dry gauze inserted into pocket to stop any bleeding, then removed. 1000 Fit assessment of mediport completed and removed once fit obtained. 1001 Dr Cherelle Clarke connected Mediport (rinsed in ATB solution) to catheter and all rinsed with antibiotic solution again. 200 Dr. Cherelle Clarke inserted BARD 8F mediport from [place] [id number] ([place] @[id number] exp 06/30/2022. Measuring catheter length using fluoro. 1003 Dr. Cherelle Clarke created subcutaneous tunnel from puncture site to port pocket. Pt tolerating well. 1004 Catheter advanced up tunnel under sin. Placement confirmed and catheter length measured using fluoro. Dr. Cherelle Clarke cut mediport catheter length to 26.5 cm. 1007 Mediport catheter inserted through the sheath and then sheath peeled away. Dr. Cherelle Clarke confirmed catheter tip placement via fluoro. 1010 Dr. Isabelle Ramírez states port aspirates with good blood return and flushes well with normal saline and then flushes with several 10ml antibiotic filled syringes. 1017 Mediport instilled with 500units/5ml heparin lock flush solution. 80 Dr. Isabelle Ramírez irrigated mediport pocket with antibiotic solution and then sutured pocket with vicryl. 1023 Topical skin affix applied to right IJ site and atop vicryl sutures of right chest pocket. Pt condition unchanged during procedure, tolerated all well. 1026 Waiting for skin affix to dry. Pt continues to rest on table, with verbal support offered. VSS. 1029 Pt transferred to cart with 2 person assist, then taken to CT HR for recovery.        TOTAL SEDATION GIVEN: 100 MCG fentanyl IV                                                1 MG versed IVSEDATION GIVEN

## 2021-05-14 NOTE — CARE COORDINATION
Called patient to discuss enrollment in [address] [place] [address]. I left a voice mail message introducing myself and a brief description of the Care Coordination Program.   Requested a call back to discuss the program.  Call back number provided. A CC Introduction Letter mailed to patient's home address.

## 2021-05-14 NOTE — PROGRESS NOTES
1110 Iv D/C, cathter intact, pt tolerated well. 200 Pt changed into street clothes with daughter's assistance and transferred to the w/c.     1115 D/C instructions given to pt and pt's  and daughter. They verbalized understanding of given info. 1120 Pt wheeled to the surgical entrance area. Pt d/c home with family.

## 2021-05-15 LAB
ORGANISM: ABNORMAL
URINE CULTURE, ROUTINE: ABNORMAL

## 2021-05-16 ENCOUNTER — TELEPHONE (OUTPATIENT)
Dept: FAMILY MEDICINE CLINIC | Age: 76
End: 2021-05-16

## 2021-05-16 DIAGNOSIS — N39.0 URINARY TRACT INFECTION DUE TO EXTENDED-SPECTRUM BETA LACTAMASE (ESBL) PRODUCING ESCHERICHIA COLI: ICD-10-CM

## 2021-05-16 DIAGNOSIS — Z16.12 URINARY TRACT INFECTION DUE TO EXTENDED-SPECTRUM BETA LACTAMASE (ESBL) PRODUCING ESCHERICHIA COLI: ICD-10-CM

## 2021-05-16 DIAGNOSIS — Z16.12 ESBL (EXTENDED SPECTRUM BETA-LACTAMASE) PRODUCING BACTERIA INFECTION: Primary | ICD-10-CM

## 2021-05-16 DIAGNOSIS — A49.9 ESBL (EXTENDED SPECTRUM BETA-LACTAMASE) PRODUCING BACTERIA INFECTION: Primary | ICD-10-CM

## 2021-05-16 DIAGNOSIS — B96.29 URINARY TRACT INFECTION DUE TO EXTENDED-SPECTRUM BETA LACTAMASE (ESBL) PRODUCING ESCHERICHIA COLI: ICD-10-CM

## 2021-05-16 RX ORDER — CIPROFLOXACIN 500 MG/1
500 TABLET, FILM COATED ORAL EVERY 12 HOURS
Qty: 14 TABLET | Refills: 0 | Status: SHIPPED | OUTPATIENT
Start: 2021-05-16 | End: 2021-05-17 | Stop reason: DRUGHIGH

## 2021-05-17 RX ORDER — CIPROFLOXACIN 500 MG/1
500 TABLET, FILM COATED ORAL DAILY
Qty: 7 TABLET | Refills: 0 | Status: SHIPPED | OUTPATIENT
Start: 2021-05-17 | End: 2021-09-13 | Stop reason: SDUPTHER

## 2021-05-25 ENCOUNTER — OFFICE VISIT (OUTPATIENT)
Dept: INTERVENTIONAL RADIOLOGY/VASCULAR | Age: 76
End: 2021-05-25
Payer: MEDICARE

## 2021-05-25 VITALS
HEART RATE: 72 BPM | DIASTOLIC BLOOD PRESSURE: 60 MMHG | WEIGHT: 141 LBS | HEIGHT: 59 IN | BODY MASS INDEX: 28.43 KG/M2 | RESPIRATION RATE: 12 BRPM | SYSTOLIC BLOOD PRESSURE: 112 MMHG

## 2021-05-25 DIAGNOSIS — D50.0 IRON DEFICIENCY ANEMIA SECONDARY TO BLOOD LOSS (CHRONIC): ICD-10-CM

## 2021-05-25 DIAGNOSIS — R53.1 GENERALIZED WEAKNESS: ICD-10-CM

## 2021-05-25 DIAGNOSIS — Z95.828 PORT-A-CATH IN PLACE: ICD-10-CM

## 2021-05-25 DIAGNOSIS — Z45.2 ENCOUNTER FOR CARE RELATED TO VASCULAR ACCESS PORT: ICD-10-CM

## 2021-05-25 DIAGNOSIS — I87.8 POOR VENOUS ACCESS: Primary | ICD-10-CM

## 2021-05-25 DIAGNOSIS — R53.83 FATIGUE, UNSPECIFIED TYPE: ICD-10-CM

## 2021-05-25 PROCEDURE — 1123F ACP DISCUSS/DSCN MKR DOCD: CPT | Performed by: NURSE PRACTITIONER

## 2021-05-25 PROCEDURE — G8400 PT W/DXA NO RESULTS DOC: HCPCS | Performed by: NURSE PRACTITIONER

## 2021-05-25 PROCEDURE — 1036F TOBACCO NON-USER: CPT | Performed by: NURSE PRACTITIONER

## 2021-05-25 PROCEDURE — 1111F DSCHRG MED/CURRENT MED MERGE: CPT | Performed by: NURSE PRACTITIONER

## 2021-05-25 PROCEDURE — G8427 DOCREV CUR MEDS BY ELIG CLIN: HCPCS | Performed by: NURSE PRACTITIONER

## 2021-05-25 PROCEDURE — 4040F PNEUMOC VAC/ADMIN/RCVD: CPT | Performed by: NURSE PRACTITIONER

## 2021-05-25 PROCEDURE — 99213 OFFICE O/P EST LOW 20 MIN: CPT | Performed by: NURSE PRACTITIONER

## 2021-05-25 PROCEDURE — G8417 CALC BMI ABV UP PARAM F/U: HCPCS | Performed by: NURSE PRACTITIONER

## 2021-05-25 PROCEDURE — 1090F PRES/ABSN URINE INCON ASSESS: CPT | Performed by: NURSE PRACTITIONER

## 2021-05-25 RX ORDER — CARVEDILOL 25 MG/1
37.5 TABLET ORAL 2 TIMES DAILY WITH MEALS
COMMUNITY
End: 2021-06-17 | Stop reason: SDUPTHER

## 2021-05-25 ASSESSMENT — ENCOUNTER SYMPTOMS
NAUSEA: 0
TROUBLE SWALLOWING: 0
DIARRHEA: 0
EYES NEGATIVE: 1
SHORTNESS OF BREATH: 1
ABDOMINAL DISTENTION: 0
CONSTIPATION: 0
COUGH: 1
SORE THROAT: 0
VOMITING: 0
WHEEZING: 1
ABDOMINAL PAIN: 0

## 2021-05-25 NOTE — PROGRESS NOTES
Poonam Rueda, a female of 67 y.o. came to the office 2021. Chief Complaint   Patient presents with    Post-Op Check     1 week follow up - mediport       PROGRESS NOTE:     SUBJECTIVE:     2021: Poonam Rueda is here for procedure follow up S/P one week placement of mediport in right upper chest.  Denies post operative complications. Right upper chest incision is healing without complications. She reports some mild tenderness and bruising to right upper chest incisional area which is normal healing process. Has not yet received infusion through Mediport. Next infusion is scheduled second week of . She reports feeling better with improved appetite, weight maintenance, less fatigue, no further abdominal pain. HPI 2021: Poonam Rueda is a pleasant female who presents to clinic for consultation at the request of oncology for evaluation of placement of mediport secondary to poor venous access. Mediport required for IV blood and iron infusions with Dx. iron deficiency anemia secondary to blood loss. Last dose ASA 2021. Patient presents with symptoms of: fatigue, generalized weakness. She is in a wheelchair accompanied by her spouse and daughter. Family History   Problem Relation Age of Onset    Heart Disease Father         cardiac bypass    Arthritis Father     Arthritis Mother     Other Mother          at age 80    Other Sister         [facility]    No Known Problems Daughter     Stroke Son        Past Surgical History:   Procedure Laterality Date    BACK SURGERY  2017    lumbar disc    CARDIAC CATHETERIZATION  2014    DR. MIRELES / no stents    COLONOSCOPY  2016    w/polypectomy     COLONOSCOPY N/A 2020    COLONOSCOPY WITH POLYPECTOMY performed by Akash Garcia MD at [facility] N/A 10/07/2019    EUA HYSTEROSCOPY DILATATION AND CURETTAGE performed by Ebony Cleemnts DO at [facility]    ENDOSCOPY, COLON, DIAGNOSTIC      EYE SURGERY      [place] with IOL OU / [address]    umbilical hernia repair    IR PORT PLACEMENT EQUAL OR GREATER THAN 5 YEARS  5/14/2021    IR PORT PLACEMENT EQUAL OR GREATER THAN 5 YEARS 5/14/2021 [facility] PROCEDURE    LA ESOPHAGOGASTRODUODENOSCOPY TRANSORAL DIAGNOSTIC N/A 03/24/2017    EGD ESOPHAGOGASTRODUODENOSCOPY performed by Olu Barajas MD at [facility] N/A 02/08/2018    negative findings    LA REVISE MEDIAN N/CARPAL TUNNEL SURG Left 06/05/2017    LEFT  CARPAL TUNNEL RELEASE performed by Jefry Foreman MD at [facility],6+B/U,MWQYT N/A 02/08/2018    TONSILLECTOMY      as child    TUNNELED VENOUS PORT PLACEMENT Right 05/14/2021    8 [employer] Port ClearVUE by Dr. Kenji Simon  08/26/2016    w/bx     UPPER GASTROINTESTINAL ENDOSCOPY N/A 02/25/2020    EGD possible biopsy performed by Eleanor Lesches, MD at [address] N/A 07/01/2020    EGD PUSH ENTEROSCOPY performed by Brayan Ohara MD at [facility]        Past Medical History:   Diagnosis Date    Anxiety     Asthma     dx 2019 / has smoked since age 11    CHF (congestive heart failure) (HCC)     Chronic back pain     Bilateral L5 S1 Radic on emg--surprisingly worse on the left than the right--pt's symptoms and her MRI show worse on the right    Chronic obstructive pulmonary disease with acute exacerbation ([facility] [address] 10/12/2019    Depression     Fibromyalgia     Hyperlipidemia     meds > 8 yrs    Hypertension     meds > 45 yrs    On home O2     2l per n/c at bedtime mostly,     Osteoarthritis     Type II diabetes mellitus, uncontrolled (HCC)     hx > 8 yrs    Unspecified sleep apnea        Social History     Socioeconomic History    Marital status:      Spouse name: Maurilio Solares Number of children: 2    Years of education: 15    Highest education level: High school graduate   Occupational History    Occupation: Retired-   Tobacco Use    Smoking status: Former Smoker     Packs/day: 1.00     Years: 59.00     Pack years: 59.00     Types: Cigarettes     Start date: 2017     Quit date: 10/1/2020     Years since quittin.6    Smokeless tobacco: Never Used   Vaping Use    Vaping Use: Never used   Substance and Sexual Activity    Alcohol use: Not Currently    Drug use: No    Sexual activity: Yes     Partners: Male   Other Topics Concern    None   Social History Narrative    Grew up in [place]     Lives With:  [id number] Spouse    Type of Home: House    Home Layout: Two level, Performs ADL's on one level    Home Access: Stairs to enter with rails    Entrance Stairs - Number of Steps: 4    Bathroom Shower/Tub: Tub/Shower unit, Doors    Bathroom Equipment: Shower chair, Grab bars in [address]: Rolling walker, Cane, Oxygen(uses her O2 very seldom)    ADL Assistance: Needs assistance    Homemaking Assistance: Needs assistance    Homemaking Responsibilities: No(spouse performs)    Ambulation Assistance: Independent    Transfer Assistance: Independent    Active : No    Occupation: Retired    Type of occupation: worked in [employer]: patient enjoys [address] [place] [address] Strain: Low Risk     Difficulty of Paying Living Expenses: Not hard at [facility] Insecurity: No Food Insecurity    Worried About [facility] in the Last Year: Never true    [facility] in the Last Year: Never true   Transportation Needs: No Transportation Needs    Lack of Transportation (Medical): No    Lack of Transportation (Non-Medical): No   Physical Activity: Inactive    Days of Exercise per Week: 0 days    Minutes of Exercise per Session: 0 min   Stress: No Stress Concern Present    Feeling of Stress :  Only a little   Social Connections:  Moderately Integrated    Frequency of Communication with Friends and Family: More than three times a week    Frequency of Social Gatherings with Friends and Family: More than three times a week    Attends Jew Services: 1 to 4 times per year    Active Member of [employer] or Organizations: No    Attends Club or Organization Meetings: Never    Marital Status: Living with partner   Intimate Partner Violence: Not At Risk    Fear of Current or Ex-Partner: No    Emotionally Abused: No    Physically Abused: No    Sexually Abused: No       Allergies   Allergen Reactions    Ibuprofen Nausea Only    Metformin And Related      Diarrhea      Darvon [Propoxyphene Hcl] Nausea And Vomiting       Current Outpatient Medications on File Prior to Visit   Medication Sig Dispense Refill    carvedilol (COREG) 25 MG tablet Take 37.5 mg by mouth 2 times daily (with meals)      ACCU-CHEK PHYLLIS PLUS strip Test 3x daily Dx E11.65 100 each 3    guaiFENesin (MUCINEX) 600 MG extended release tablet Take 1 tablet by mouth 2 times daily as needed for Congestion 60 tablet 2    ipratropium-albuterol (DUONEB) 0.5-2.5 (3) MG/3ML SOLN nebulizer solution Inhale 3 mLs into the lungs 3 times daily 360 mL 0    levETIRAcetam (KEPPRA) 500 MG tablet Take 1 tablet by mouth 2 times daily 60 tablet 3    insulin lispro (HUMALOG) 100 UNIT/ML injection vial 18 units at each mels 1 vial 3    insulin glargine (LANTUS) 100 UNIT/ML injection vial 60 units at bedtime 3 vial 3    verapamil (CALAN SR) 120 MG extended release tablet Take 1 tablet by mouth daily 90 tablet 3    rosuvastatin (CRESTOR) 40 MG tablet TAKE 1 TABLET BY MOUTH ONCE DAILY IN THE EVENING 90 tablet 0    PARoxetine (PAXIL) 40 MG tablet TAKE 1 TABLET BY MOUTH ONCE DAILY IN THE MORNING 90 tablet 1    Accu-Chek Softclix Lancets MISC bid 100 each 3    Blood Glucose Monitoring Suppl (ACCU-CHEK PHYLLIS CONNECT) w/Device KIT 1 kit by Does not apply route daily 1 kit 0    Continuous Blood Gluc Sensor (FREESTYLE MURALI 14 DAY SENSOR) [facility] Use every 2 weeks 2 each 1    Continuous Blood Gluc  (FREESTYLE MURALI 14 DAY READER) [place] As directed 1 Device 0     No current facility-administered medications on file prior to visit. Review of Systems   Constitutional: Positive for fatigue (intermittent). Negative for activity change, appetite change, chills, fever (weight gain) and unexpected weight change. HENT: Negative for congestion, sore throat and trouble swallowing. Ear discharge: chest and nasal.    Eyes: Negative. Respiratory: Positive for cough, shortness of breath and wheezing. Cardiovascular: Negative for chest pain, palpitations and leg swelling. Gastrointestinal: Negative for abdominal distention, abdominal pain, constipation, diarrhea, nausea and vomiting. Endocrine: Negative. Genitourinary: Negative. Negative for dysuria, frequency, hematuria and urgency. Musculoskeletal: Negative. Skin: Negative. Neurological: Positive for dizziness (intermittent). Negative for light-headedness and headaches. Hematological: Negative. Psychiatric/Behavioral: Negative. OBJECTIVE:  /60   Pulse 72   Resp 12   Ht 4' 11\" (1.499 m)   Wt 141 lb (64 kg)   LMP  (LMP Unknown)   BMI 28.48 kg/m²     Physical Exam  Constitutional:       Appearance: Normal appearance. HENT:      Head: Normocephalic and atraumatic. Nose: Nose normal.   Cardiovascular:      Rate and Rhythm: Normal rate and regular rhythm. Heart sounds: Normal heart sounds. Pulmonary:      Effort: Pulmonary effort is normal.      Breath sounds: Examination of the right-lower field reveals decreased breath sounds. Examination of the left-lower field reveals decreased breath sounds. Decreased breath sounds present. Abdominal:      Palpations: Abdomen is soft. Musculoskeletal:         General: Normal range of motion. Cervical back: Neck supple.       Right lower leg: No edema. Left lower leg: No edema. Skin:     General: Skin is warm and dry. Capillary Refill: Capillary refill takes 2 to 3 seconds. Neurological:      Mental Status: She is alert and oriented to person, place, and time. Psychiatric:         Mood and Affect: Mood normal.         Behavior: Behavior normal.       5/14/2021:   Impression   Impression:   1.    Successful placement of a central venous catheter with subcutaneous reservoir in the internal jugular vein.           Additional clinical data:    Patient needing long-term IV access for anemia       Procedure:   1.   Ultrasound guidance for vascular access. Ultrasound images saved to PACS. 2.   Fluoroscopic guidance for placement of a central line. 3.   Placement of a central venous line with subcutaneous reservoir using the right internal jugular vein. 4.   IV Conscious sedation. ASSESSMENT AND PLAN:  Chart reviewed of pertinent information. Lab work and medications reviewed. Dermabond trimmed to mediport incision. Assessment:   Poor venous access: Oncology requesting Mediport placement for IV blood and iron infusions. S/P one week placement of Right IJ mediport. Iron deficiency anemia secondary to blood loss. PLAN:  1. No further follow up care required. Continue mediport management with infusion center. This discussed in detail with patient, spouse and daughter regarding routine Heparin flushing when mediport no in use.      Carly Dhillon, JOHNATHON - CNP

## 2021-05-26 ENCOUNTER — OFFICE VISIT (OUTPATIENT)
Dept: PALLATIVE CARE | Age: 76
End: 2021-05-26
Payer: MEDICARE

## 2021-05-26 ENCOUNTER — TELEPHONE (OUTPATIENT)
Dept: PALLATIVE CARE | Age: 76
End: 2021-05-26

## 2021-05-26 VITALS
TEMPERATURE: 97.3 F | OXYGEN SATURATION: 99 % | HEART RATE: 67 BPM | SYSTOLIC BLOOD PRESSURE: 103 MMHG | DIASTOLIC BLOOD PRESSURE: 79 MMHG

## 2021-05-26 DIAGNOSIS — I50.9 CONGESTIVE HEART FAILURE, UNSPECIFIED HF CHRONICITY, UNSPECIFIED HEART FAILURE TYPE (HCC): ICD-10-CM

## 2021-05-26 DIAGNOSIS — B96.29 UTI DUE TO EXTENDED-SPECTRUM BETA LACTAMASE (ESBL) PRODUCING ESCHERICHIA COLI: ICD-10-CM

## 2021-05-26 DIAGNOSIS — Z74.09 IMPAIRED MOBILITY AND ACTIVITIES OF DAILY LIVING: ICD-10-CM

## 2021-05-26 DIAGNOSIS — Z51.5 PALLIATIVE CARE ENCOUNTER: ICD-10-CM

## 2021-05-26 DIAGNOSIS — K92.2 GASTROINTESTINAL HEMORRHAGE, UNSPECIFIED GASTROINTESTINAL HEMORRHAGE TYPE: ICD-10-CM

## 2021-05-26 DIAGNOSIS — R06.02 SOB (SHORTNESS OF BREATH) ON EXERTION: Primary | ICD-10-CM

## 2021-05-26 DIAGNOSIS — M25.50 ARTHRALGIA, UNSPECIFIED JOINT: ICD-10-CM

## 2021-05-26 DIAGNOSIS — Z95.828 PORT-A-CATH IN PLACE: ICD-10-CM

## 2021-05-26 DIAGNOSIS — Z78.9 IMPAIRED MOBILITY AND ACTIVITIES OF DAILY LIVING: ICD-10-CM

## 2021-05-26 DIAGNOSIS — N18.4 CKD (CHRONIC KIDNEY DISEASE) STAGE 4, GFR 15-29 ML/MIN (HCC): ICD-10-CM

## 2021-05-26 DIAGNOSIS — Z16.12 UTI DUE TO EXTENDED-SPECTRUM BETA LACTAMASE (ESBL) PRODUCING ESCHERICHIA COLI: ICD-10-CM

## 2021-05-26 DIAGNOSIS — N39.0 UTI DUE TO EXTENDED-SPECTRUM BETA LACTAMASE (ESBL) PRODUCING ESCHERICHIA COLI: ICD-10-CM

## 2021-05-26 PROCEDURE — 99350 HOME/RES VST EST HIGH MDM 60: CPT | Performed by: NURSE PRACTITIONER

## 2021-05-26 RX ORDER — ASPIRIN 81 MG/1
81 TABLET ORAL DAILY
COMMUNITY
End: 2021-08-06

## 2021-05-26 ASSESSMENT — ENCOUNTER SYMPTOMS
ABDOMINAL DISTENTION: 0
TROUBLE SWALLOWING: 0
CONSTIPATION: 0
DIARRHEA: 0
NAUSEA: 0
ABDOMINAL PAIN: 1
WHEEZING: 1
VOICE CHANGE: 0
SHORTNESS OF BREATH: 1

## 2021-05-26 NOTE — TELEPHONE ENCOUNTER
Spoke with  and notified him of patient's last hgb from [facility]. Hgb. 10.5 and stable.

## 2021-05-26 NOTE — Clinical Note
Please fax note to oncology team at [facility]  [address]   [address]  Tel: [phone]  Fax: [phone]

## 2021-05-26 NOTE — PROGRESS NOTES
Subjective:      Patient Id: Jeyson Trimble was seen at  home in [facility] for palliative medicine consult for symptom management, advance care planning and goals of care discussion. She was accompanied to the appointment by: spouse and daughter, Carlos Harrison. Chief Complaint   Patient presents with    Fatigue    Shortness of Breath      HPI       Caryl Sharma is a 67 y.o. female referred to palliative care by Dr. Marcy Lowe for symptom management, advance care planning, and goals of care conversation. She was previously seen by palliative care in November of 2019. Jeyson Trimble has complex medical history that includes chronic GI bleeding throughout the distal small intestine, anxiety, asthma, CHF, COPD, CKD depression, fibromyalgia, OA, and sleep apnea. She was recently admitted to [facility] for cardiac arrest. Home visit is necessary in lieu of office due to significant frailty and high symptom burden from comorbid illnesses. Family is concerned with patient's blood counts as she was just hospitalized with a GI bleed. They report the GI bleeding has been ongoing for about 2 years. Recently, she has been going about 3 weeks before getting symptomatic and needing blood transfusions. When she is symptomatic she gets lethargic and very drowsy. She is seeing Dr. Nolene Klinefelter from hematology and has an appointment scheduled on June 16th. The family is not sure what the plan is in regards to her GI bleeding but know she is not a candidate for surgical intervention. She had labwork drawn on the 24th, but they haven't been notified of results at this time. Patient currently has [address] for physical therapy. She is using a walker and a cane and her strength is improving. She is currently living at home with her . She has a new port and the family is requesting an order for [address] to flush the port. Patient denies any edema and states that she does not usually have issues with swelling.  She has SOB depending on what type of activity she is doing. She reports that her SOB is better than normal and she does not wear home O2. She reports getting wheezy at times but breathing treatments help. Patient quit smoking in April. Denies ever having a COPD exacerbation needing treatment or hospitalization. Daughter reports that the patient had a recent UTI. At the time of the urine culture, there were no symptoms of a UTI except foul smelling urine. Per Fatimah Oas, the only symptom patient gets when she has a UTI is increasing confusion. Daughter reports that the urine does not have a foul smell anymore. Has been dealing with UTI's for over a year. Several cultures have came back positive for ESBL e. Coli. Currently, patient is not having symptoms of a UTI. Denies increased confusion, burning, frequency, or urgency. No fever. Denies pain at this time, but patient does get arthralgias from arthritis. Takes aleve as needed. Past Medical History:   Diagnosis Date    Anxiety     Asthma     dx 2019 / has smoked since age 14    CHF (congestive heart failure) (HCC)     Chronic back pain     Bilateral L5 S1 Radic on emg--surprisingly worse on the left than the right--pt's symptoms and her MRI show worse on the right    Chronic obstructive pulmonary disease with acute exacerbation ([facility] [address] 10/12/2019    Depression     Fibromyalgia     Hyperlipidemia     meds > 8 yrs    Hypertension     meds > 45 yrs    On home O2     2l per n/c at bedtime mostly,     Osteoarthritis     Type II diabetes mellitus, uncontrolled (HCC)     hx > 8 yrs    Unspecified sleep apnea      Past Surgical History:   Procedure Laterality Date    BACK SURGERY  2017    lumbar disc    CARDIAC CATHETERIZATION  11/03/2014    DR. MIRELES / no stents    COLONOSCOPY  08/29/2016    w/polypectomy     COLONOSCOPY N/A 09/29/2020    COLONOSCOPY WITH POLYPECTOMY performed by Gavin [facility]ques [facility] at [facility] N/A 10/07/2019 EUA HYSTEROSCOPY DILATATION AND CURETTAGE performed by Trinity Wright DO at [facility]. [address], COLON, DIAGNOSTIC      EYE SURGERY      [place] with IOL OU / [id number]    umbilical hernia repair    IR PORT PLACEMENT EQUAL OR GREATER THAN 5 YEARS  2021    IR PORT PLACEMENT EQUAL OR GREATER THAN 5 YEARS 2021 [facility]Z SPECIAL PROCEDURE    ME ESOPHAGOGASTRODUODENOSCOPY TRANSORAL DIAGNOSTIC N/A 2017    EGD ESOPHAGOGASTRODUODENOSCOPY performed by Wayne Maciel MD at [place] [address] 2018    negative findings    ME REVISE MEDIAN N/CARPAL TUNNEL SURG Left 2017    LEFT  CARPAL TUNNEL RELEASE performed by Franky Collier [facility] at [facility],6+H/Z,HJW[facility] N/A 2018    TONSILLECTOMY      as child    TUNNELED VENOUS PORT PLACEMENT Right 2021    8 Fr [employer] Port ClearVUE by Dr. Jennifer Shirley  2016    w/bx     UPPER GASTROINTESTINAL ENDOSCOPY N/A 2020    EGD possible biopsy performed by Shiraz Dee MD at [address] [place] [address] N/A 2020    EGD PUSH ENTEROSCOPY performed by Nilson Guillermo MD at [facility] Marital status:      Spouse name: Hebert Ledesma Number of children: 2    Years of education: 12    Highest education level: High school graduate   Occupational History    Occupation: Retired-   Tobacco Use    Smoking status: Former Smoker     Packs/day: 1.00     Years: 59.00     Pack years: 59.00     Types: Cigarettes     Start date: 2017     Quit date: 10/1/2020     Years since quittin.6    Smokeless tobacco: Never Used   Vaping Use    Vaping Use: Never used   Substance and Sexual Activity    Alcohol use: Not Currently    Drug use: No    Sexual activity: Yes     Partners: Male   Other Topics Concern    Not on file Social History Narrative    Grew up in [place]     Lives With:  [id number] Spouse    Type of Home: House    Home Layout: Two level, Performs ADL's on one level    Home Access: Stairs to enter with rails    Entrance Stairs - Number of Steps: 4    Bathroom Shower/Tub: Tub/Shower unit, Doors    Bathroom Equipment: Shower chair, Grab bars in [facility]: Rolling walker, Cane, Oxygen(uses her O2 very seldom)    ADL Assistance: Needs assistance    Homemaking Assistance: Needs assistance    Homemaking Responsibilities: No(spouse performs)    Ambulation Assistance: Independent    Transfer Assistance: Independent    Active : No    Occupation: Retired    Type of occupation: worked in [employer]: patient enjoys [address] Strain: Low Risk     Difficulty of Paying Living Expenses: Not hard at [facility] Insecurity: No Food Insecurity    Worried About [address] [facility] in the Last Year: Never true    [address] in the Last Year: Never true   Transportation Needs: No Transportation Needs    Lack of Transportation (Medical): No    Lack of Transportation (Non-Medical): No   Physical Activity: Inactive    Days of Exercise per Week: 0 days    Minutes of Exercise per Session: 0 min   Stress: No Stress Concern Present    Feeling of Stress : Only a little   Social Connections:  Moderately Integrated    Frequency of Communication with Friends and Family: More than three times a week    Frequency of Social Gatherings with Friends and Family: More than three times a week    Attends Adventism Services: 1 to 4 times per year    Active Member of [employer] or Organizations: No    Attends Club or Organization Meetings: Never    Marital Status: Living with partner   Intimate Partner Violence: Not At Risk    Fear of Current or Ex-Partner: No    Emotionally Abused: No    Physically Abused: No    Sexually Abused: No     Family History Problem Relation Age of Onset    Heart Disease Father         cardiac bypass    Arthritis Father     Arthritis Mother     Other Mother          at age 80    Other Sister         [facility]    No Known Problems Daughter     Stroke Son      Allergies   Allergen Reactions    Ibuprofen Nausea Only    Metformin And Related      Diarrhea      Darvon [Propoxyphene Hcl] Nausea And Vomiting     Current Outpatient Medications on File Prior to Visit   Medication Sig Dispense Refill    aspirin 81 MG EC tablet Take 81 mg by mouth daily      carvedilol (COREG) 25 MG tablet Take 37.5 mg by mouth 2 times daily (with meals)      guaiFENesin (MUCINEX) 600 MG extended release tablet Take 1 tablet by mouth 2 times daily as needed for Congestion 60 tablet 2    ipratropium-albuterol (DUONEB) 0.5-2.5 (3) MG/3ML SOLN nebulizer solution Inhale 3 mLs into the lungs 3 times daily 360 mL 0    levETIRAcetam (KEPPRA) 500 MG tablet Take 1 tablet by mouth 2 times daily 60 tablet 3    insulin lispro (HUMALOG) 100 UNIT/ML injection vial 18 units at each mels 1 vial 3    insulin glargine (LANTUS) 100 UNIT/ML injection vial 60 units at bedtime 3 vial 3    verapamil (CALAN SR) 120 MG extended release tablet Take 1 tablet by mouth daily 90 tablet 3    rosuvastatin (CRESTOR) 40 MG tablet TAKE 1 TABLET BY MOUTH ONCE DAILY IN THE EVENING 90 tablet 0    PARoxetine (PAXIL) 40 MG tablet TAKE 1 TABLET BY MOUTH ONCE DAILY IN THE MORNING 90 tablet 1    ACCU-CHEK PHYLLIS PLUS strip Test 3x daily Dx E11.65 100 each 3    Accu-Chek Softclix Lancets MISC bid 100 each 3    Blood Glucose Monitoring Suppl (ACCU-CHEK PHYLLIS CONNECT) w/Device KIT 1 kit by Does not apply route daily 1 kit 0    Continuous Blood Gluc Sensor (FREESTYLE MURALI 14 DAY SENSOR) [facility] Use every 2 weeks 2 each 1    Continuous Blood Gluc  (FREESTYLE MURALI 14 DAY READER) [place] As directed 1 Device 0     No current facility-administered medications on file prior to visit. Review of Systems   Constitutional: Positive for fatigue. Negative for activity change, appetite change, fever and unexpected weight change. HENT: Negative for congestion, trouble swallowing and voice change. Respiratory: Positive for shortness of breath (with exertion) and wheezing (at times). Cardiovascular: Negative for chest pain and leg swelling. Gastrointestinal: Positive for abdominal pain (occassional sharp pains prior to BM). Negative for abdominal distention, constipation, diarrhea and nausea. Chronic GI bleeding   Genitourinary: Negative for dysuria, frequency and urgency. Musculoskeletal: Positive for arthralgias and gait problem. Skin: Negative. Neurological: Positive for dizziness (when SOB) and weakness. Hematological: Bruises/bleeds easily (chronic GI bleeding). Psychiatric/Behavioral: Negative for confusion, dysphoric mood and sleep disturbance. The patient is not nervous/anxious. Objective:   /79   Pulse 67   Temp 97.3 °F (36.3 °C)   LMP  (LMP Unknown)   SpO2 99%    Wt Readings from Last 3 Encounters:   05/25/21 141 lb (64 kg)   05/14/21 141 lb (64 kg)   05/12/21 141 lb (64 kg)     Physical Exam  Constitutional:       General: She is not in acute distress. Appearance: Normal appearance. HENT:      Head: Normocephalic and atraumatic. Eyes:      General: No scleral icterus. Right eye: No discharge. Left eye: No discharge. Extraocular Movements: Extraocular movements intact. Conjunctiva/sclera: Conjunctivae normal.   Cardiovascular:      Rate and Rhythm: Normal rate and regular rhythm. Heart sounds: Normal heart sounds. Pulmonary:      Effort: Pulmonary effort is normal.      Breath sounds: Normal breath sounds. No wheezing. Abdominal:      General: Bowel sounds are normal.      Palpations: Abdomen is soft. Musculoskeletal:      Cervical back: Normal range of motion and neck supple.       Right lower leg: No edema. Left lower leg: No edema. Skin:     General: Skin is warm and dry. Neurological:      Mental Status: She is alert. Mental status is at baseline. Motor: Weakness present. Gait: Gait abnormal.   Psychiatric:         Mood and Affect: Mood normal.         Behavior: Behavior normal.         Assessment and Plan:      1. SOB (shortness of breath) on exertion  Improving per patient and family. Continue COPD directed therapies. Patient has not ever had a COPD exacerbation per her and her family. Continue to follow with hematology for CBC monitoring as SOB increases in relation to low blood count. 2. Gastrointestinal hemorrhage, unspecified gastrointestinal hemorrhage type  Obtain lab results from Dr. Ela Boo office and call patient with result of last hgb. Follow-up with Dr. Lucas Limon on June 16th. Avoid NSAIDs including Aleve as these can increase risk for bleeding. Patient and family to discuss aspirin use with Dr. Saima Morrison and weigh the risks vs. Benefits. 3. UTI due to extended-spectrum beta lactamase (ESBL) producing Escherichia coli  Had MA fax referral order from primary care to infectious disease as they did not receive the referral. Discussed the possibility of patient being colonized with e. Coli. Discussed not running another urine culture at this time as patient is asymptomatic. Call if patient develops increased confusion, frequency, urgency, dysuria, or a fever. Patient to follow-up with Dr. Td Krishna to see if additional treatment is needed. 4. CKD (chronic kidney disease) stage 4, GFR 15-29 ml/min ([facility])  Discussed avoiding NSAIDs as these can worsen kidney function. 5. Port-A-Cath in place  Order placed for home health care to flush port. 6. Impaired mobility and activities of daily living  Continue physical therapy with [facility].     7. Congestive heart failure, unspecified HF chronicity, unspecified heart failure type ([facility] [address]  Monitor weight daily, call if you gain 3 lbs in one day or 5 lbs. in one week or for increased edema, sob, cough, orthopnea, or chest pain. Elevate BLE's. Wear compression socks as needed during the day and take off at night. Keep salt intake under 2 gms daily. 8. Arthralgia, unspecified joint  Ok to use tylenol 1,000 mg every 8 hours as needed. Patient would prefer not to take anything at this time. 9. Palliative care encounter  Explained the role of palliative care in treating patient. Discussed symptom management related to chronic disease/condition. Provided emotional support and active listening. Patient understands and is agreeable to current plan. - Advance Care Planning   We discussed Living Will (LW), and [facility]) as well as their activation. Patient choice discussed. LW/HCPOA in place No;Tasked  for assistance with completing paperwork No; Code status discussed Yes; signed Yes. Code DNR/CCA. All related questions were answered completely. - Goals of Care Discussion:  Disease process and goals of treatment were discussed in basic terms. Aimee's goal is to optimize available comfort care measures, maximize function, and reduce hospitalization. We discussed the palliative care philosophy in light of those goals. We discussed all care options contingent on treatment response and QOL. Much active listening, presence, and emotional support were given. Opioid contract signed:No    Due to acuity, symptomatology and high-risk medication management, I advised patient to Return in about 22 days (around 6/17/2021). Thanks for the opportunity you have allowed us to provide palliative care to [facility]. We will be in touch as care progresses. Please feel free to reach out to us should you have any questions or requests.     Total Time 90 mins (Adv. Care planning 8 mins)     Total time includes reviewing labs and tests, reviewing history, medications, and allergies, counseling patient, performing

## 2021-05-26 NOTE — TELEPHONE ENCOUNTER
Received patient's office notes and most recent lab results from Dr. Srinivasan Chamorro @ the [facility]. Scanned and attached to encounter.

## 2021-05-27 ENCOUNTER — TELEPHONE (OUTPATIENT)
Dept: PALLATIVE CARE | Age: 76
End: 2021-05-27

## 2021-05-27 ENCOUNTER — TELEPHONE (OUTPATIENT)
Dept: INFECTIOUS DISEASES | Age: 76
End: 2021-05-27

## 2021-05-27 NOTE — TELEPHONE ENCOUNTER
Beny Curran called regarding patient's appointment for infectious disease and he has some questions for you before they schedule this appointment.

## 2021-05-28 ENCOUNTER — TELEPHONE (OUTPATIENT)
Dept: PALLATIVE CARE | Age: 76
End: 2021-05-28

## 2021-05-28 NOTE — TELEPHONE ENCOUNTER
[facility] left a voicemail regarding patient's referral. She states that the reason is to flush a picc line, and [facility] does not do this or have the supplies for it.

## 2021-06-01 ENCOUNTER — OFFICE VISIT (OUTPATIENT)
Dept: CARDIOLOGY CLINIC | Age: 76
End: 2021-06-01
Payer: MEDICARE

## 2021-06-01 VITALS
OXYGEN SATURATION: 99 % | RESPIRATION RATE: 18 BRPM | HEART RATE: 74 BPM | DIASTOLIC BLOOD PRESSURE: 64 MMHG | SYSTOLIC BLOOD PRESSURE: 112 MMHG

## 2021-06-01 DIAGNOSIS — I42.1 HOCM (HYPERTROPHIC OBSTRUCTIVE CARDIOMYOPATHY) (HCC): ICD-10-CM

## 2021-06-01 DIAGNOSIS — E78.2 MIXED HYPERLIPIDEMIA: ICD-10-CM

## 2021-06-01 DIAGNOSIS — I25.10 CORONARY ARTERY DISEASE INVOLVING NATIVE CORONARY ARTERY OF NATIVE HEART WITHOUT ANGINA PECTORIS: ICD-10-CM

## 2021-06-01 DIAGNOSIS — I50.41 ACUTE COMBINED SYSTOLIC AND DIASTOLIC CHF, NYHA CLASS 1 (HCC): Primary | ICD-10-CM

## 2021-06-01 DIAGNOSIS — I10 ESSENTIAL HYPERTENSION: ICD-10-CM

## 2021-06-01 PROCEDURE — 4040F PNEUMOC VAC/ADMIN/RCVD: CPT | Performed by: INTERNAL MEDICINE

## 2021-06-01 PROCEDURE — G8400 PT W/DXA NO RESULTS DOC: HCPCS | Performed by: INTERNAL MEDICINE

## 2021-06-01 PROCEDURE — 99214 OFFICE O/P EST MOD 30 MIN: CPT | Performed by: INTERNAL MEDICINE

## 2021-06-01 PROCEDURE — 1090F PRES/ABSN URINE INCON ASSESS: CPT | Performed by: INTERNAL MEDICINE

## 2021-06-01 PROCEDURE — G8427 DOCREV CUR MEDS BY ELIG CLIN: HCPCS | Performed by: INTERNAL MEDICINE

## 2021-06-01 PROCEDURE — 1123F ACP DISCUSS/DSCN MKR DOCD: CPT | Performed by: INTERNAL MEDICINE

## 2021-06-01 PROCEDURE — 1036F TOBACCO NON-USER: CPT | Performed by: INTERNAL MEDICINE

## 2021-06-01 PROCEDURE — G8417 CALC BMI ABV UP PARAM F/U: HCPCS | Performed by: INTERNAL MEDICINE

## 2021-06-01 RX ORDER — PANTOPRAZOLE SODIUM 40 MG/1
40 TABLET, DELAYED RELEASE ORAL
Qty: 90 TABLET | Refills: 1 | Status: SHIPPED | OUTPATIENT
Start: 2021-06-01 | End: 2021-08-06 | Stop reason: SDUPTHER

## 2021-06-01 ASSESSMENT — ENCOUNTER SYMPTOMS
WHEEZING: 0
RESPIRATORY NEGATIVE: 1
COUGH: 0
BLOOD IN STOOL: 0
CHEST TIGHTNESS: 0
NAUSEA: 0
GASTROINTESTINAL NEGATIVE: 1
STRIDOR: 0
SHORTNESS OF BREATH: 0
EYES NEGATIVE: 1

## 2021-06-01 NOTE — PROGRESS NOTES
Subsequent Progress Note  Patient: Jack Cardozo  YOB: 1945  MRN: [id number]    Chief Complaint: htn HF SOB DM HOCM  Chief Complaint   Patient presents with    Follow-up     6 week    Congestive Heart Failure    Coronary Artery Disease    Hypertension       CV Data:  3/2019 Echo EF 79% no KYLE   10/8/2019 Cath CX 20-30 LVEF 95%   10/2020 Echo EF 55 Moderate CLVH  10/5/2020 Cath LAD 50-60     Subjective/HPI: she stopped Verapamil 3 days ago due to severe fatigue and weakness. Feels better now. No cp no sob no bleed. C/o nocturnal leg cramps    3/27/2020 Patient and/or health care decision maker is aware that that he may receive a bill for this telephone service, depending on his insurance coverage, and has provided verbal consent to proceed. This visit was completed via telephone. Time spent on the phone with patient 18 minutes. She was to f/u with Dr. Ruba Garcia but was on my VV schedule today. No CP no SOB. Still has occasional night time leg cramps. She is walking and compliant with meds. No LE edema. 1/15/21 recent multiple hospitalization. She was intubated and in shock. Had TVP for Bradycardia but stabilized and did not require PPM.      3/11/21 TELEHEALTH EVALUATION -- Audio/Visual (During QTLGA-92 public health emergency)    No cp no sob no falls no bleed. Not walking much. Poor appetite.  /104. .. ran out of Verapamil and Clonidine. 3/18/21 Patient and/or health care decision maker is aware that that he/she may receive a bill for this telephone service, depending on his insurance coverage, and has provided verbal consent to proceed. This visit was completed via telephone. Total time 14 minutes. Had been doing well. This AM she took extra full dose Torsemde 50 mg because she thought she was not uriniating enough.  called in due to her BP being 79/56. She is awake and alert sitting. No cp no sob.  Just feels weak and tired     4/16/21 TELEHEALTH EVALUATION -- Audio/Visual (During TAHOT-45 public health emergency)    Recent resp failure intubated. No cp no sob no falls no bleed Bp stable. Her voice is hoarse from ET tube and swelling. She has no dysphagia    6/1/21 feels better. Recent hosp had intestinal bleed and taken off ASA>  She will have denat extraction o 6/26. She will stay off ASA for now. EKG:    Past Medical History:   Diagnosis Date    Anxiety     Asthma     dx 2019 / has smoked since age 14    CHF (congestive heart failure) (HCC)     Chronic back pain     Bilateral L5 S1 Radic on emg--surprisingly worse on the left than the right--pt's symptoms and her MRI show worse on the right    Chronic obstructive pulmonary disease with acute exacerbation ([facility] [address] 10/12/2019    Depression     Fibromyalgia     Hyperlipidemia     meds > 8 yrs    Hypertension     meds > 45 yrs    On home O2     2l per n/c at bedtime mostly,     Osteoarthritis     Type II diabetes mellitus, uncontrolled (HCC)     hx > 8 yrs    Unspecified sleep apnea        Past Surgical History:   Procedure Laterality Date    BACK SURGERY  2017    lumbar disc    CARDIAC CATHETERIZATION  11/03/2014    DR. MIRELES / no stents    COLONOSCOPY  08/29/2016    w/polypectomy     COLONOSCOPY N/A 09/29/2020    COLONOSCOPY WITH POLYPECTOMY performed by Dot Currie MD at [facility] N/A 10/07/2019    EUA HYSTEROSCOPY DILATATION AND CURETTAGE performed by Tiffany Quintanilla DO at [facility]. [address], COLON, DIAGNOSTIC      EYE SURGERY      [place] with IOL OU / [id number]    umbilical hernia repair    IR PORT PLACEMENT EQUAL OR GREATER THAN 5 YEARS  5/14/2021    IR PORT PLACEMENT EQUAL OR GREATER THAN 5 YEARS 5/14/2021 MLOZ SPECIAL PROCEDURE    HI ESOPHAGOGASTRODUODENOSCOPY TRANSORAL DIAGNOSTIC N/A 03/24/2017    EGD ESOPHAGOGASTRODUODENOSCOPY performed by Woody Lerma [facility] at [facility]    LA LAP,DIAGNOSTIC ABDOMEN N/A 2018    negative findings    LA REVISE MEDIAN N/CARPAL TUNNEL SURG Left 2017    LEFT  CARPAL TUNNEL RELEASE performed by Annabella Jean [facility] at [facility],TCUMF N/A 2018    TONSILLECTOMY      as child    TUNNELED VENOUS PORT PLACEMENT Right 2021    8 Fr [employer] Port ClearVUE by Dr. Taqueria Simon  2016    w/bx     UPPER GASTROINTESTINAL ENDOSCOPY N/A 2020    EGD possible biopsy performed by Paolo Donovan MD at [address] [place] [address] N/A 2020    EGD PUSH ENTEROSCOPY performed by Severa Morrow, MD at [facility]       Family History   Problem Relation Age of Onset    Heart Disease Father         cardiac bypass    Arthritis Father     Arthritis Mother     Other Mother          at age 80    Other Sister         [facility]    No Known Problems Daughter     Stroke Son        Social History     Socioeconomic History    Marital status:      Spouse name: Florentino Kruger Number of children: 2    Years of education: 15    Highest education level: High school graduate   Occupational History    Occupation: Retired-   Tobacco Use    Smoking status: Former Smoker     Packs/day: 1.00     Years: 59.00     Pack years: 59.00     Types: Cigarettes     Start date: 2017     Quit date: 10/1/2020     Years since quittin.6    Smokeless tobacco: Never Used   Vaping Use    Vaping Use: Never used   Substance and Sexual Activity    Alcohol use: Not Currently    Drug use: No    Sexual activity: Yes     Partners: Male   Other Topics Concern    None   Social History Narrative    Grew up in [place]     Lives With:  Librado Spouse    Type of Home: House    Home Layout: Two level, Performs ADL's on one level    Home Access: Stairs to enter with rails    Entrance Stairs - Number of Steps: 4    Bathroom Shower/Tub: Tub/Shower unit, Doors    Bathroom Equipment: Shower chair, Grab bars in shower    Home Equipment: Rolling walker, Cane, Oxygen(uses her O2 very seldom)    ADL Assistance: Needs assistance    Homemaking Assistance: Needs assistance    Homemaking Responsibilities: No(spouse performs)    Ambulation Assistance: Independent    Transfer Assistance: Independent    Active : No    Occupation: Retired    Type of occupation: worked in [employer]: patient enjoys [address] [employer] Strain: Low Risk     Difficulty of Paying Living Expenses: Not hard at [employer] Insecurity: No Food Insecurity    Worried About [address] [employer] in the Last Year: Never true    [employer] in the Last Year: Never true   Transportation Needs: No Transportation Needs    Lack of Transportation (Medical): No    Lack of Transportation (Non-Medical): No   Physical Activity: Inactive    Days of Exercise per Week: 0 days    Minutes of Exercise per Session: 0 min   Stress: No Stress Concern Present    Feeling of Stress : Only a little   Social Connections:  Moderately Integrated    Frequency of Communication with Friends and Family: More than three times a week    Frequency of Social Gatherings with Friends and Family: More than three times a week    Attends [employer] Services: 1 to 4 times per year    Active Member of [employer] or Organizations: No    Attends Club or Organization Meetings: Never    Marital Status: Living with partner   Intimate Partner Violence: Not At Risk    Fear of Current or Ex-Partner: No    Emotionally Abused: No    Physically Abused: No    Sexually Abused: No       Allergies   Allergen Reactions    Ibuprofen Nausea Only    Metformin And Related      Diarrhea      Darvon [Propoxyphene Hcl] Nausea And Vomiting       Current Outpatient Medications   Medication Sig Dispense Refill    pantoprazole (PROTONIX) 40 MG tablet Take 1 tablet by 02/24/2020    LDLCALC 73 10/13/2019     No results found for: LABVLDL, VLDL  Lab Results   Component Value Date    CHOLHDLRATIO 3.4 09/11/2012     CMP:    Lab Results   Component Value Date     04/30/2021    K 4.4 04/30/2021    K 4.4 10/19/2020     04/30/2021    CO2 20 04/30/2021    BUN 23 04/30/2021    CREATININE 1.78 04/30/2021    GFRAA 33.5 04/30/2021    LABGLOM 27.7 04/30/2021    GLUCOSE 90 04/30/2021    PROT 6.2 04/29/2021    LABALBU 3.5 04/29/2021    CALCIUM 8.7 04/30/2021    BILITOT <0.2 04/29/2021    ALKPHOS 118 04/29/2021    AST 20 04/29/2021    ALT 12 04/29/2021     BMP:    Lab Results   Component Value Date     04/30/2021    K 4.4 04/30/2021    K 4.4 10/19/2020     04/30/2021    CO2 20 04/30/2021    BUN 23 04/30/2021    LABALBU 3.5 04/29/2021    CREATININE 1.78 04/30/2021    CALCIUM 8.7 04/30/2021    GFRAA 33.5 04/30/2021    LABGLOM 27.7 04/30/2021    GLUCOSE 90 04/30/2021     Magnesium:    Lab Results   Component Value Date    MG 1.7 04/01/2021     TSH:  Lab Results   Component Value Date    TSH 0.474 11/30/2020       Patient Active Problem List   Diagnosis    Hypertension    Hyperlipidemia    Uncontrolled type 2 diabetes mellitus with complication, without long-term current use of insulin (HCC)    Fibromyalgia    Anxiety    Smoker    Insomnia    DJD (degenerative joint disease), lumbar    Lumbosacral radiculopathy at S1    DDD (degenerative disc disease), lumbar    Dysphonia    CTS (carpal tunnel syndrome)    High risk medication use-Norco - 12/20/17 OARRS PM&R, 02/20/18 OARRS PM&R, 03/07/18 OARRS PM&R, Urine Drug screen negative 02/06/17 PM&R--MED CONTRACT 2/6/17    SOB (shortness of breath) on exertion    Chest pain    Memory deficit    Artificial lens present    Presbyopia    Astigmatism, regular    Cataract, nuclear sclerotic senile    IDDM (insulin dependent diabetes mellitus)    Regular astigmatism    Nuclear senile cataract    Neck pain    Lumbosacral radiculopathy at L5    Spinal stenosis of lumbar region with neurogenic claudication    Impaired mobility and activities of daily living    Non-compliant patient NO showed FU 1/10/17 Dr Perry Grover Insomnia secondary to chronic pain    Reactive depression    Diabetic radiculopathy (HCC)    Cervical radicular pain    Diabetic asymmetric polyneuropathy (HCC)    Myalgia    Intercostal neuropathy    Osteoarthritis of spine with radiculopathy, lumbar region    Acute combined systolic and diastolic CHF, NYHA class 1 (HCC)    PMB (postmenopausal bleeding)    Acute on chronic diastolic heart failure (HCC)    [facility]est[facility]) [facility]    Hypertensive urgency    Uncontrolled type 2 diabetes mellitus with hyperglycemia (HCC)    Chronic obstructive pulmonary disease with acute exacerbation (HCC)    Acute on chronic diastolic (congestive) heart failure ([address]    SAÚL (acute kidney injury) ([address]    Hypoglycemia    HOCM (hypertrophic obstructive cardiomyopathy) ([facility])    Gastrointestinal hemorrhage    COPD exacerbation ([facility])    Anemia    AVM (arteriovenous malformation)    Symptomatic anemia    Flash pulmonary edema ([facility])    Acute on chronic kidney failure ([facility])    Dysarthria    Chronic fatigue    Encephalopathy acute    Adenomatous polyp of sigmoid colon    Adenomatous polyp of transverse colon    Sepsis ([facility])    Major depressive disorder in remission ([address]    Gastroesophageal reflux disease without esophagitis    Acute cystitis without hematuria    CKD (chronic kidney disease) stage 4, GFR 15-29 ml/min ([facility])    Cardiopulmonary arrest ([facility])    Gait abnormality    Late effects of CVA (cerebrovascular accident)    Cardiac arrest ([address]    Acute respiratory failure with hypoxia ([facility])    Goals of care, counseling/discussion    Bradycardia    Moderate hypoxic-ischemic encephalopathy    Infection due to ESBL-producing Escherichia coli    GI bleed    Port-A-Cath in

## 2021-06-02 ENCOUNTER — TELEPHONE (OUTPATIENT)
Dept: PALLATIVE CARE | Age: 76
End: 2021-06-02

## 2021-06-02 NOTE — TELEPHONE ENCOUNTER
Patient's  reports patient is having pain in her kidney area. Unsure if it is musculoskeletal related as she has had issues with her back before. Reports that it comes and goes infrequently. Reports had the pain last week and she took some aleve and it went away. Standing or walking aggravates it. Has had this pain before. No fevers. No dysuria. No increased confusion. No blood in the urine. No history of kidney stones. Pain sounds more musculoskeletal at this time. Discussed symptoms to look out for including increased confusion, fevers, blood in the urine, painful urination, and abdominal pain.  aware and will take patient to ER or urgent care if any of these occur. Stop aleve as it can affect the kidney function. Take tylenol 650 mg every 4-6 hours as needed. Call with any further questions or concerns.

## 2021-06-10 LAB
ALBUMIN SERPL-MCNC: 4.2 G/DL (ref 3.5–4.6)
ANION GAP SERPL CALCULATED.3IONS-SCNC: 12 MEQ/L (ref 9–15)
BUN BLDV-MCNC: 36 MG/DL (ref 8–23)
CALCIUM SERPL-MCNC: 10.1 MG/DL (ref 8.5–9.9)
CHLORIDE BLD-SCNC: 106 MEQ/L (ref 95–107)
CO2: 21 MEQ/L (ref 20–31)
CREAT SERPL-MCNC: 2.17 MG/DL (ref 0.5–0.9)
GFR AFRICAN AMERICAN: 26.6
GFR NON-AFRICAN AMERICAN: 22
GLUCOSE BLD-MCNC: 218 MG/DL (ref 70–99)
IRON SATURATION: 32 % (ref 11–46)
IRON: 95 UG/DL (ref 37–145)
PHOSPHORUS: 3.4 MG/DL (ref 2.3–4.8)
POTASSIUM SERPL-SCNC: 5.1 MEQ/L (ref 3.4–4.9)
SODIUM BLD-SCNC: 139 MEQ/L (ref 135–144)
TOTAL IRON BINDING CAPACITY: 297 UG/DL (ref 178–450)

## 2021-06-14 ENCOUNTER — TELEPHONE (OUTPATIENT)
Dept: INFECTIOUS DISEASES | Age: 76
End: 2021-06-14

## 2021-06-17 ENCOUNTER — OFFICE VISIT (OUTPATIENT)
Dept: PALLATIVE CARE | Age: 76
End: 2021-06-17

## 2021-06-17 VITALS
HEART RATE: 70 BPM | DIASTOLIC BLOOD PRESSURE: 61 MMHG | TEMPERATURE: 100.2 F | OXYGEN SATURATION: 98 % | SYSTOLIC BLOOD PRESSURE: 126 MMHG

## 2021-06-17 DIAGNOSIS — G89.29 CHRONIC RIGHT-SIDED THORACIC BACK PAIN: ICD-10-CM

## 2021-06-17 DIAGNOSIS — N18.4 CKD (CHRONIC KIDNEY DISEASE) STAGE 4, GFR 15-29 ML/MIN (HCC): ICD-10-CM

## 2021-06-17 DIAGNOSIS — I50.41 ACUTE COMBINED SYSTOLIC AND DIASTOLIC CHF, NYHA CLASS 1 (HCC): ICD-10-CM

## 2021-06-17 DIAGNOSIS — Z86.19 HISTORY OF ESBL E. COLI INFECTION: ICD-10-CM

## 2021-06-17 DIAGNOSIS — B37.31 VAGINAL YEAST INFECTION: ICD-10-CM

## 2021-06-17 DIAGNOSIS — J44.9 CHRONIC OBSTRUCTIVE PULMONARY DISEASE, UNSPECIFIED COPD TYPE (HCC): ICD-10-CM

## 2021-06-17 DIAGNOSIS — R06.09 DOE (DYSPNEA ON EXERTION): Primary | ICD-10-CM

## 2021-06-17 DIAGNOSIS — M54.6 CHRONIC RIGHT-SIDED THORACIC BACK PAIN: ICD-10-CM

## 2021-06-17 DIAGNOSIS — Z51.5 PALLIATIVE CARE ENCOUNTER: ICD-10-CM

## 2021-06-17 PROCEDURE — 99350 HOME/RES VST EST HIGH MDM 60: CPT | Performed by: NURSE PRACTITIONER

## 2021-06-17 RX ORDER — FLUCONAZOLE 150 MG/1
150 TABLET ORAL ONCE
Qty: 1 TABLET | Refills: 0 | Status: SHIPPED | OUTPATIENT
Start: 2021-06-17 | End: 2021-06-17

## 2021-06-17 ASSESSMENT — ENCOUNTER SYMPTOMS
ABDOMINAL DISTENTION: 0
SHORTNESS OF BREATH: 1
DIARRHEA: 0
TROUBLE SWALLOWING: 0
NAUSEA: 0
CONSTIPATION: 0
VOICE CHANGE: 0

## 2021-06-17 NOTE — PROGRESS NOTES
production. No edema. Patient has not been to see ID yet for her history of ESBL E. Coli in her urine. ID wanted another urine specimen after she had her first appointment, but they didn't want to go to their first appointment without the specimen. They are requesting a urine culture to be ordered so they have the results for her first visit. Patient going to see a nephrologist, Dr. Kelly Wei. They have an appointment 6/30, and the family states that he will resume her diuretics. Past Medical History:   Diagnosis Date    Anxiety     Asthma     dx 2019 / has smoked since age 14    CHF (congestive heart failure) (HCC)     Chronic back pain     Bilateral L5 S1 Radic on emg--surprisingly worse on the left than the right--pt's symptoms and her MRI show worse on the right    Chronic obstructive pulmonary disease with acute exacerbation ([facility] [address] 10/12/2019    Depression     Fibromyalgia     Hyperlipidemia     meds > 8 yrs    Hypertension     meds > 45 yrs    On home O2     2l per n/c at bedtime mostly,     Osteoarthritis     Type II diabetes mellitus, uncontrolled (HCC)     hx > 8 yrs    Unspecified sleep apnea      Past Surgical History:   Procedure Laterality Date    BACK SURGERY  2017    lumbar disc    CARDIAC CATHETERIZATION  11/03/2014    DR. MIRELES / no stents    COLONOSCOPY  08/29/2016    w/polypectomy     COLONOSCOPY N/A 09/29/2020    COLONOSCOPY WITH POLYPECTOMY performed by Shiraz Dee MD at [facility] N/A 10/07/2019    EUA HYSTEROSCOPY DILATATION AND CURETTAGE performed by Trinity Wright DO at [facility]. [address], COLON, DIAGNOSTIC      EYE SURGERY      [place] with IOL OU / [address]    umbilical hernia repair    IR PORT PLACEMENT EQUAL OR GREATER THAN 5 YEARS  5/14/2021    IR PORT PLACEMENT EQUAL OR GREATER THAN 5 YEARS 5/14/2021 MLOZ SPECIAL PROCEDURE    AL ESOPHAGOGASTRODUODENOSCOPY TRANSORAL DIAGNOSTIC N/A 2017    EGD ESOPHAGOGASTRODUODENOSCOPY performed by Charles Castañeda MD at [facility] N/A 2018    negative findings    ID REVISE MEDIAN N/CARPAL TUNNEL SURG Left 2017    LEFT  CARPAL TUNNEL RELEASE performed by Mickey Roper MD at [facility],[id number] N/A 2018    TONSILLECTOMY      as child    TUNNELED VENOUS PORT PLACEMENT Right 2021    8 Fr [employer] Port ClearVUE by Dr. Kianna Elder  2016    w/bx     UPPER GASTROINTESTINAL ENDOSCOPY N/A 2020    EGD possible biopsy performed by Elsa June MD at [place] [address] N/A 2020    EGD PUSH ENTEROSCOPY performed by Shaye Ramirez MD at [facility] Marital status:      Spouse name: Shana Cesar Number of children: 2    Years of education: 12    Highest education level: High school graduate   Occupational History    Occupation: Retired-   Tobacco Use    Smoking status: Former Smoker     Packs/day: 1.00     Years: 59.00     Pack years: 59.00     Types: Cigarettes     Start date: 2017     Quit date: 10/1/2020     Years since quittin.7    Smokeless tobacco: Never Used   Vaping Use    Vaping Use: Never used   Substance and Sexual Activity    Alcohol use: Not Currently    Drug use: No    Sexual activity: Yes     Partners: Male   Other Topics Concern    Not on file   Social History Narrative    Grew up in [address] [place] [address] With:  Librado Spouse    Type of Home: House    Home Layout: Two level, Performs ADL's on one level    Home Access: Stairs to enter with rails    Entrance Stairs - Number of Steps: 4    Bathroom Shower/Tub: Tub/Shower unit, Doors    Bathroom Equipment: Shower chair, Grab bars in [place]: Rolling walker, [employer], Oxygen(uses her O2 very seldom)    ADL Assistance: Needs assistance    Homemaking Assistance: Needs assistance    Homemaking Responsibilities: No(spouse performs)    Ambulation Assistance: Independent    Transfer Assistance: Independent    Active : No    Occupation: Retired    Type of occupation: worked in [employer]: patient enjoys [employer]     Social Determinants of Health     Financial Resource Strain: Low Risk     Difficulty of Paying Living Expenses: Not hard at all   Food Insecurity: No Food Insecurity    Worried About [address] in the Last Year: Never true    [address] [place] in the Last Year: Never true   Transportation Needs: No Transportation Needs    Lack of Transportation (Medical): No    Lack of Transportation (Non-Medical): No   Physical Activity: Inactive    Days of Exercise per Week: 0 days    Minutes of Exercise per Session: 0 min   Stress: No Stress Concern Present    Feeling of Stress : Only a little   Social Connections:  Moderately Integrated    Frequency of Communication with Friends and Family: More than three times a week    Frequency of Social Gatherings with Friends and Family: More than three times a week    Attends Confucianism Services: 1 to 4 times per year    Active Member of [employer] or Organizations: No    Attends Club or Organization Meetings: Never    Marital Status: Living with partner   Intimate Partner Violence: Not At Risk    Fear of Current or Ex-Partner: No    Emotionally Abused: No    Physically Abused: No    Sexually Abused: No     Family History   Problem Relation Age of Onset    Heart Disease Father         cardiac bypass    Arthritis Father     Arthritis Mother     Other Mother          at age 80    Other Sister         [facility]    No Known Problems Daughter     Stroke Son      Allergies   Allergen Reactions    Ibuprofen Nausea Only    Metformin And Related      Diarrhea      Darvon [Propoxyphene Hcl] Nausea And Vomiting     Current Outpatient Medications on File Prior to Visit   Medication Sig Dispense Refill    pantoprazole (PROTONIX) 40 MG tablet Take 1 tablet by mouth every morning (before breakfast) 90 tablet 1    aspirin 81 MG EC tablet Take 81 mg by mouth daily (Patient not taking: Reported on 6/1/2021)      carvedilol (COREG) 25 MG tablet Take 37.5 mg by mouth 2 times daily (with meals)      ACCU-CHEK PHYLLIS PLUS strip Test 3x daily Dx E11.65 100 each 3    ipratropium-albuterol (DUONEB) 0.5-2.5 (3) MG/3ML SOLN nebulizer solution Inhale 3 mLs into the lungs 3 times daily 360 mL 0    levETIRAcetam (KEPPRA) 500 MG tablet Take 1 tablet by mouth 2 times daily 60 tablet 3    insulin lispro (HUMALOG) 100 UNIT/ML injection vial 18 units at each mels 1 vial 3    insulin glargine (LANTUS) 100 UNIT/ML injection vial 60 units at bedtime 3 vial 3    verapamil (CALAN SR) 120 MG extended release tablet Take 1 tablet by mouth daily 90 tablet 3    rosuvastatin (CRESTOR) 40 MG tablet TAKE 1 TABLET BY MOUTH ONCE DAILY IN THE EVENING 90 tablet 0    PARoxetine (PAXIL) 40 MG tablet TAKE 1 TABLET BY MOUTH ONCE DAILY IN THE MORNING 90 tablet 1    Accu-Chek Softclix Lancets MISC bid 100 each 3    Blood Glucose Monitoring Suppl (ACCU-CHEK PHYLLIS CONNECT) w/Device KIT 1 kit by Does not apply route daily 1 kit 0    Continuous Blood Gluc Sensor (FREESTYLE MURALI 14 DAY SENSOR) [facility] Use every 2 weeks 2 each 1    Continuous Blood Gluc  (FREESTYLE MURALI 14 DAY READER) [place] As directed 1 Device 0     No current facility-administered medications on file prior to visit. Review of Systems   Constitutional: Negative for activity change, appetite change, fatigue, fever and unexpected weight change. HENT: Negative for congestion, trouble swallowing and voice change. Respiratory: Positive for cough and shortness of breath (with exertion). Negative for wheezing. Cardiovascular: Negative for chest pain and leg swelling.    Gastrointestinal: Negative for abdominal distention, constipation, diarrhea and nausea. Genitourinary: Negative for dysuria, frequency, pelvic pain, urgency and vaginal discharge. Musculoskeletal: Positive for back pain and gait problem. Skin: Negative. Neurological: Positive for weakness. Negative for dizziness and speech difficulty. Psychiatric/Behavioral: Negative for confusion, dysphoric mood and sleep disturbance. The patient is not nervous/anxious. Objective:   LMP  (LMP Unknown)    Wt Readings from Last 3 Encounters:   05/25/21 141 lb (64 kg)   05/14/21 141 lb (64 kg)   05/12/21 141 lb (64 kg)     Physical Exam  Constitutional:       General: She is not in acute distress. Appearance: Normal appearance. HENT:      Head: Normocephalic and atraumatic. Eyes:      General: No scleral icterus. Right eye: No discharge. Left eye: No discharge. Extraocular Movements: Extraocular movements intact. Conjunctiva/sclera: Conjunctivae normal.   Cardiovascular:      Rate and Rhythm: Normal rate and regular rhythm. Heart sounds: Normal heart sounds. Pulmonary:      Effort: Pulmonary effort is normal.      Breath sounds: Normal breath sounds. No wheezing. Comments: SOB with exertion  Abdominal:      General: Bowel sounds are normal.      Palpations: Abdomen is soft. Musculoskeletal:      Cervical back: Normal range of motion and neck supple. Right lower leg: No edema. Left lower leg: No edema. Skin:     General: Skin is warm and dry. Neurological:      Mental Status: She is alert. Mental status is at baseline. Motor: Weakness present. Gait: Gait abnormal.   Psychiatric:         Mood and Affect: Mood normal.         Behavior: Behavior normal.         Assessment and Plan:        1. Chronic obstructive pulmonary disease, unspecified COPD type ([facility] [address]  Continue COPD directed therapies.  Call for increased SOB, cough, sputum production, excessive fatigue, fever, chills, or myalgias. Will reorder:  - albuterol-ipratropium (COMBIVENT RESPIMAT)  MCG/ACT AERS inhaler; Inhale 1 puff into the lungs every 6 hours as needed for Wheezing or Shortness of Breath     2. MENDEZ (dyspnea on exertion)  Discussed possible causes including worsening of COPD and CHF. Ambulated patient with pulse ox. She did become increasingly SOB with exertion but SPO2 remained at 98%. Lungs are clear. Discussed trying steroids but family would like to refrain from these and opt for a chest x-ray first as patient's blood sugar becomes very elevated with steroid use. Will defer from antibiotic therapy at this time, as I do not feel there are any signs of infection, and patient also has a yeast infection. - XR CHEST STANDARD (2 VW); Future    3. Acute combined systolic and diastolic CHF  Monitor weight daily, call if you gain 3 lbs in one day or 5 lbs. in one week or for increased edema, sob, cough, orthopnea, or chest pain. 4. Vaginal yeast infection  - fluconazole (DIFLUCAN) 150 MG tablet; Take 1 tablet by mouth once for 1 dose    5. History of ESBL E. coli infection  - Urinalysis Reflex to Culture (will order per family request and needed by ID)    6. Chronic right-sided thoracic back pain  Will refrain from additional pain medication at this time per patient and family request. Tylenol doesn't help and patient cannot take NSAIDS. Pain is intermittent. We discussed that it is not likely an infection in her kidney due to how long it has been going on and there are no other signs of infection. WBC is normal and patient has no other signs of a UTI. Pain has been intermittent for months. Patient did have a temp. But I believe it is inaccurate because she was in the sun for a while before I took it, and it was a temporal reading. Family will recheck when she is inside for a little while and call me if it is abnormal. No other fevers/chills.     7. CKD (chronic kidney disease) stage 4, GFR 15-29

## 2021-06-18 RX ORDER — CARVEDILOL 25 MG/1
37.5 TABLET ORAL 2 TIMES DAILY WITH MEALS
Qty: 270 TABLET | Refills: 2 | Status: SHIPPED | OUTPATIENT
Start: 2021-06-18 | End: 2022-03-29

## 2021-06-18 ASSESSMENT — ENCOUNTER SYMPTOMS
BACK PAIN: 1
COUGH: 1
WHEEZING: 0

## 2021-06-24 DIAGNOSIS — E11.69 TYPE 2 DIABETES MELLITUS WITH OTHER SPECIFIED COMPLICATION, WITH LONG-TERM CURRENT USE OF INSULIN (HCC): ICD-10-CM

## 2021-06-24 DIAGNOSIS — Z79.4 TYPE 2 DIABETES MELLITUS WITH OTHER SPECIFIED COMPLICATION, WITH LONG-TERM CURRENT USE OF INSULIN (HCC): ICD-10-CM

## 2021-06-24 DIAGNOSIS — D64.9 ANEMIA, UNSPECIFIED TYPE: ICD-10-CM

## 2021-06-24 DIAGNOSIS — Z87.440 HISTORY OF UTI: ICD-10-CM

## 2021-06-24 LAB
BACTERIA: ABNORMAL /HPF
BASOPHILS ABSOLUTE: 0 K/UL (ref 0–0.2)
BASOPHILS RELATIVE PERCENT: 0.4 %
BILIRUBIN URINE: NEGATIVE
BLOOD, URINE: ABNORMAL
CLARITY: ABNORMAL
COLOR: YELLOW
EOSINOPHILS ABSOLUTE: 0.2 K/UL (ref 0–0.7)
EOSINOPHILS RELATIVE PERCENT: 4.7 %
EPITHELIAL CELLS, UA: ABNORMAL /HPF (ref 0–5)
GLUCOSE URINE: NEGATIVE MG/DL
HCT VFR BLD CALC: 38 % (ref 37–47)
HEMOGLOBIN: 12.2 G/DL (ref 12–16)
HYALINE CASTS: ABNORMAL /HPF (ref 0–5)
KETONES, URINE: NEGATIVE MG/DL
LEUKOCYTE ESTERASE, URINE: ABNORMAL
LYMPHOCYTES ABSOLUTE: 0.8 K/UL (ref 1–4.8)
LYMPHOCYTES RELATIVE PERCENT: 17.3 %
MCH RBC QN AUTO: 27.4 PG (ref 27–31.3)
MCHC RBC AUTO-ENTMCNC: 32.2 % (ref 33–37)
MCV RBC AUTO: 85.1 FL (ref 82–100)
MONOCYTES ABSOLUTE: 0.4 K/UL (ref 0.2–0.8)
MONOCYTES RELATIVE PERCENT: 9.8 %
NEUTROPHILS ABSOLUTE: 3 K/UL (ref 1.4–6.5)
NEUTROPHILS RELATIVE PERCENT: 67.8 %
NITRITE, URINE: NEGATIVE
PDW BLD-RTO: 18.1 % (ref 11.5–14.5)
PH UA: 5.5 (ref 5–9)
PLATELET # BLD: 271 K/UL (ref 130–400)
PROTEIN UA: 100 MG/DL
RBC # BLD: 4.46 M/UL (ref 4.2–5.4)
RBC UA: ABNORMAL /HPF (ref 0–5)
SPECIFIC GRAVITY UA: 1.02 (ref 1–1.03)
UROBILINOGEN, URINE: 0.2 E.U./DL
WBC # BLD: 4.4 K/UL (ref 4.8–10.8)
WBC UA: >100 /HPF (ref 0–5)

## 2021-06-24 RX ORDER — INSULIN GLARGINE 100 [IU]/ML
INJECTION, SOLUTION SUBCUTANEOUS
Qty: 3 VIAL | Refills: 3 | Status: SHIPPED | OUTPATIENT
Start: 2021-06-24 | End: 2021-09-15 | Stop reason: SDUPTHER

## 2021-06-24 RX ORDER — BLOOD SUGAR DIAGNOSTIC
STRIP MISCELLANEOUS
Qty: 100 EACH | Refills: 3 | Status: SHIPPED | OUTPATIENT
Start: 2021-06-24 | End: 2021-08-11 | Stop reason: SDUPTHER

## 2021-06-26 LAB
ORGANISM: ABNORMAL
URINE CULTURE, ROUTINE: ABNORMAL

## 2021-07-13 ENCOUNTER — VIRTUAL VISIT (OUTPATIENT)
Dept: INFECTIOUS DISEASES | Age: 76
End: 2021-07-13
Payer: MEDICARE

## 2021-07-13 DIAGNOSIS — Z16.12 INFECTION DUE TO ESBL-PRODUCING ESCHERICHIA COLI: Primary | ICD-10-CM

## 2021-07-13 DIAGNOSIS — Z71.89 COUNSELING ON HEALTH PROMOTION AND DISEASE PREVENTION: ICD-10-CM

## 2021-07-13 DIAGNOSIS — A49.8 INFECTION DUE TO ESBL-PRODUCING ESCHERICHIA COLI: Primary | ICD-10-CM

## 2021-07-13 PROCEDURE — 1090F PRES/ABSN URINE INCON ASSESS: CPT | Performed by: INTERNAL MEDICINE

## 2021-07-13 PROCEDURE — 99214 OFFICE O/P EST MOD 30 MIN: CPT | Performed by: INTERNAL MEDICINE

## 2021-07-13 PROCEDURE — 1123F ACP DISCUSS/DSCN MKR DOCD: CPT | Performed by: INTERNAL MEDICINE

## 2021-07-13 PROCEDURE — G8417 CALC BMI ABV UP PARAM F/U: HCPCS | Performed by: INTERNAL MEDICINE

## 2021-07-13 PROCEDURE — G8400 PT W/DXA NO RESULTS DOC: HCPCS | Performed by: INTERNAL MEDICINE

## 2021-07-13 PROCEDURE — 4040F PNEUMOC VAC/ADMIN/RCVD: CPT | Performed by: INTERNAL MEDICINE

## 2021-07-13 PROCEDURE — G8428 CUR MEDS NOT DOCUMENT: HCPCS | Performed by: INTERNAL MEDICINE

## 2021-07-13 PROCEDURE — 1036F TOBACCO NON-USER: CPT | Performed by: INTERNAL MEDICINE

## 2021-07-13 NOTE — PROGRESS NOTES
2021    TELEHEALTH EVALUATION -- Audio/Visual (During YBETY-79 public health emergency)      Ileana Jackman (:  1945) has requested an audio/video evaluation for the following concern(s):    UTI    Due to the COVID-19 outbreak, patient's visit was converted to a virtual visit. Patient agreed to proceed with a virtual visit with me via Doxy. me with my location at the office. Patient reports their location. The risks and benefits of converting to a virtual visit were discussed in light of the current infectious disease epidemic. Services were provided through an audio/video synchronous discussion virtually to substitute for in person clinic visit. THIS virtual visit was conducted via interactive/real-time audio/video. Due to this being a TeleHealth encounter, evaluation of the following organ systems is limited: Vitals/Constitutional/EENT/Resp/CV/GI//MS/Neuro/Skin/Heme-Lymph-Imm.}    Pursuant to the emergency declaration under the [address] [place] [address], [facility] waiver authority and the [employer] Act, this Virtual Visit was conducted, with patient's consent, to reduce the patient's risk of exposure to COVID-19 and provide care for the patient. Infectious Disease Progress Note       2021    Patient is a followup. Hx of ESBL UTI  Hx of cardiac arrest 3/2021, pneumonia due to aspiration. Had been treated with Meropenem at that time. Last seen by ID in 2021. Recent UC with ESBL E coli and positive UA on 2021. No leukocytosis  Cipro sensitive. Symptoms resolved. Allergies reviewed - no allergies to abx. Last Cr was~ 2.17 on 6/10/21    Concerns with sexual activity addressed. Already finished her course of cipro and feels better. Discussed ways to help prevent UTI's with her partner present.    We discussed ideas such as timed voiding, urination after sexual activity, starting a cranberry supplement, and maintaining good overall hygiene by avoiding plastic backed pads or changing them (if needed ) every 4 hours. Handwashing is essential.       Lab Results   Component Value Date    WBC 4.4 (L) 06/24/2021    HGB 12.2 06/24/2021    HCT 38.0 06/24/2021    MCV 85.1 06/24/2021     06/24/2021     Lab Results   Component Value Date     06/10/2021    K 5.1 06/10/2021    K 4.4 10/19/2020     06/10/2021    CO2 21 06/10/2021    BUN 36 06/10/2021    CREATININE 2.17 06/10/2021    GLUCOSE 218 06/10/2021    CALCIUM 10.1 06/10/2021        WBC trends are being monitored. Antibiotic doses are being adjusted per most recent renal labs.        Patient Active Problem List   Diagnosis    Hypertension    Hyperlipidemia    Uncontrolled type 2 diabetes mellitus with complication, without long-term current use of insulin (HCC)    Fibromyalgia    Anxiety    Smoker    Insomnia    DJD (degenerative joint disease), lumbar    Lumbosacral radiculopathy at S1    DDD (degenerative disc disease), lumbar    Dysphonia    CTS (carpal tunnel syndrome)    High risk medication use-Norco - 12/20/17 O[facility] PM&R, 02/20/18 OAR[facility] PM&R, 03/07/18 O[facility] PM&R, Urine Drug screen negative 02/06/17 PM&R--MED CONTRACT 2/6/17    SOB (shortness of breath) on exertion    Chest pain    Memory deficit    Artificial lens present    Presbyopia    Astigmatism, regular    Cataract, nuclear sclerotic senile    IDDM (insulin dependent diabetes mellitus)    Regular astigmatism    Nuclear senile cataract    Neck pain    Lumbosacral radiculopathy at L5    Spinal stenosis of lumbar region with neurogenic claudication    Impaired mobility and activities of daily living    Non-compliant patient NO showed FU 1/10/17 Dr Zahraa Arora Insomnia secondary to chronic pain    Reactive depression    Diabetic radiculopathy (HCC)    Cervical radicular pain    Diabetic asymmetric polyneuropathy (HCC)    Myalgia    Intercostal neuropathy    Osteoarthritis of spine with radiculopathy, lumbar region    Acute combined systolic and diastolic CHF, NYHA class 1 (HCC)    PMB (postmenopausal bleeding)    Acute on chronic diastolic heart failure (HCC)    CHF (congestive heart failure) ([facility])    Hypertensive urgency    Uncontrolled type 2 diabetes mellitus with hyperglycemia (HCC)    Chronic obstructive pulmonary disease with acute exacerbation (HCC)    Acute on chronic diastolic (congestive) heart failure (HCC)    SAÚL (acute kidney injury) ([facility] [address]    Hypoglycemia    HOCM (hypertrophic obstructive cardiomyopathy) ([facility])    Gastrointestinal hemorrhage    COPD exacerbation (HCC)    Anemia    AVM (arteriovenous malformation)    Symptomatic anemia    Flash pulmonary edema (HCC)    Acute on chronic kidney failure (HCC)    Dysarthria    Chronic fatigue    Encephalopathy acute    Adenomatous polyp of sigmoid colon    Adenomatous polyp of transverse colon    Sepsis (HCC)    Major depressive disorder in remission ([address]    Gastroesophageal reflux disease without esophagitis    Acute cystitis without hematuria    CKD (chronic kidney disease) stage 4, GFR 15-29 ml/min ([facility])    Cardiopulmonary arrest (HCC)    Gait abnormality    Late effects of CVA (cerebrovascular accident)    Cardiac arrest ([facility] [address]    Acute respiratory failure with hypoxia ([facility])    Goals of care, counseling/discussion    Bradycardia    Moderate hypoxic-ischemic encephalopathy    Infection due to ESBL-producing Escherichia coli    GI bleed    Port-A-Cath in place     Since we cannot conduct an in-person exam, the following were addressed with the patient to the best of my capability via virtual visit:   Patient does not perceive any new visual deficits  No diaphoresis or flushing in the face  Patient is able to flex and extend her neck with ease. Patient can inhale and exhale without any difficulty and chest seems to be expanding symmetrically. No conversational dyspnea. Patient does not feel any palpitations   Patient's  abdomen is not protruberant beyond their normal size. No new swelling of their joints. No observed neurological changes or slurred speech when speaking to the patient    No new skin rash or ulcers      ASSESSMENT:  Hx of ESBL UTI  Counseling and prevention    PLAN:  No need for further abx at this time. As above, counseled on ways to help prevent UTI's     Imaging and labs were reviewed per medical records and any ID pertinent labs were also addressed  Time spent today in combination of reviewing patient's chart, medications, labs, pictures when pertinent, provider communication as necessary, counseling patient, care/coordination not otherwise reported here, and patient face to face virtual visit 30 min.   >50% of that time spent counseling and coordination of patient care  Addressed preventive measures such as vaccinations, the importance of annual exam with the PCP, and the importance of health maintenance by dietary and exercise regimens. All questions were answered from an ID perspective and to the best of my ability. Risks and benefits of ID prescribed medications were discussed with patient or supporting staff caring for the patient as appropriate and feedback obtained.      Sweetie Rico, DO DO

## 2021-07-15 DIAGNOSIS — I46.9 CARDIOPULMONARY ARREST (HCC): Primary | ICD-10-CM

## 2021-07-15 DIAGNOSIS — M54.17 LUMBOSACRAL RADICULOPATHY AT S1: ICD-10-CM

## 2021-07-15 DIAGNOSIS — I50.41 ACUTE COMBINED SYSTOLIC AND DIASTOLIC CHF, NYHA CLASS 1 (HCC): ICD-10-CM

## 2021-07-15 DIAGNOSIS — J44.1 CHRONIC OBSTRUCTIVE PULMONARY DISEASE WITH ACUTE EXACERBATION (HCC): ICD-10-CM

## 2021-07-15 DIAGNOSIS — I42.1 HOCM (HYPERTROPHIC OBSTRUCTIVE CARDIOMYOPATHY) (HCC): ICD-10-CM

## 2021-07-15 DIAGNOSIS — M79.7 FIBROMYALGIA: ICD-10-CM

## 2021-07-19 ENCOUNTER — OFFICE VISIT (OUTPATIENT)
Dept: PALLATIVE CARE | Age: 76
End: 2021-07-19
Payer: MEDICARE

## 2021-07-19 VITALS
DIASTOLIC BLOOD PRESSURE: 52 MMHG | HEART RATE: 54 BPM | SYSTOLIC BLOOD PRESSURE: 94 MMHG | OXYGEN SATURATION: 94 % | TEMPERATURE: 98.8 F

## 2021-07-19 DIAGNOSIS — M54.6 CHRONIC RIGHT-SIDED THORACIC BACK PAIN: Primary | ICD-10-CM

## 2021-07-19 DIAGNOSIS — R06.09 DOE (DYSPNEA ON EXERTION): ICD-10-CM

## 2021-07-19 DIAGNOSIS — R53.83 OTHER FATIGUE: ICD-10-CM

## 2021-07-19 DIAGNOSIS — G89.29 CHRONIC RIGHT-SIDED THORACIC BACK PAIN: Primary | ICD-10-CM

## 2021-07-19 DIAGNOSIS — J44.9 CHRONIC OBSTRUCTIVE PULMONARY DISEASE, UNSPECIFIED COPD TYPE (HCC): ICD-10-CM

## 2021-07-19 DIAGNOSIS — I50.41 ACUTE COMBINED SYSTOLIC AND DIASTOLIC CHF, NYHA CLASS 1 (HCC): ICD-10-CM

## 2021-07-19 DIAGNOSIS — Z51.5 PALLIATIVE CARE ENCOUNTER: ICD-10-CM

## 2021-07-19 PROCEDURE — 99349 HOME/RES VST EST MOD MDM 40: CPT | Performed by: NURSE PRACTITIONER

## 2021-07-19 RX ORDER — LIDOCAINE AND PRILOCAINE 25; 25 MG/G; MG/G
CREAM TOPICAL
COMMUNITY
Start: 2021-06-16

## 2021-07-19 RX ORDER — LIDOCAINE 50 MG/G
1 PATCH TOPICAL DAILY
Qty: 30 PATCH | Refills: 0 | Status: SHIPPED | OUTPATIENT
Start: 2021-07-19 | End: 2021-08-06

## 2021-07-19 ASSESSMENT — ENCOUNTER SYMPTOMS
VOICE CHANGE: 0
TROUBLE SWALLOWING: 0
DIARRHEA: 0
COUGH: 1
ABDOMINAL DISTENTION: 0
SHORTNESS OF BREATH: 1
NAUSEA: 0
BACK PAIN: 1
BLOOD IN STOOL: 0
WHEEZING: 0
CONSTIPATION: 0

## 2021-07-19 NOTE — PROGRESS NOTES
Subjective:      Patient Id: Antoine Palacios was seen at  home in [facility] for palliative medicine follow-up. She was accompanied to the appointment by: daughter, Danae Ley. Chief Complaint   Patient presents with    Fatigue      HPI       Lashonda Hernandez is a 67 y.o. female with a complex medical history that includes chronic GI bleeding throughout the distal small intestine, anxiety, asthma, CHF, COPD, CKD depression, fibromyalgia, OA, and sleep apnea. She was recently admitted to [facility] for cardiac arrest. Home visit is necessary in lieu of office due to significant frailty and high symptom burden from comorbid illnesses. Patient is alert and oriented, sitting outside in chair. She complains of fatigue and sleeping until 5 pm. Daughter, Danae Ley, states that after she takes her morning pills she gets very tired. They karie like to know if they could change some of her medications to the evening to see if this helps. Last lab draw to check blood count showed that hemoglobin was greater than 12. No new issues with bleeding or blood in stool. Patient had ID appointment regarding continuous ESBL in the urine. ID confirmed colonization of the urinary tract with ESBL. They recommended cranberry tabs and no further antibiotics at this time. No current UTI symptoms. Feels her yeast infection has also resolved. Patient saw nephrology, and he does not believe the back pain is related to her kidneys. Patient's back pain has been stable. She reports that it comes and goes still. Currently rating the pain at an 8/10 to the right of her thoracic spine. She would be open to trying something topical to help with the pain. Patient's mobility has been stable, and she has not had any functional decline. Patient and family report continued SOB with exertion that remains stable. No edema.        Past Medical History:   Diagnosis Date    Anxiety     Asthma     dx 2019 / has smoked since age 11    CHF (congestive heart failure) (HCC)     Chronic back pain     Bilateral L5 S1 Radic on emg--surprisingly worse on the left than the right--pt's symptoms and her MRI show worse on the right    Chronic obstructive pulmonary disease with acute exacerbation (HCC) 10/12/2019    Depression     Fibromyalgia     Hyperlipidemia     meds > 8 yrs    Hypertension     meds > 45 yrs    On home O2     2l per n/c at bedtime mostly,     Osteoarthritis     Type II diabetes mellitus, uncontrolled (HCC)     hx > 8 yrs    Unspecified sleep apnea      Past Surgical History:   Procedure Laterality Date    BACK SURGERY  2017    lumbar disc    CARDIAC CATHETERIZATION  11/03/2014    DR. MIRELES / no stents    COLONOSCOPY  08/29/2016    w/polypectomy     COLONOSCOPY N/A 09/29/2020    COLONOSCOPY WITH POLYPECTOMY performed by Brendan Shi MD at [facility] N/A 10/07/2019    EUA HYSTEROSCOPY DILATATION AND CURETTAGE performed by Yolanda Alvarado DO at  [address], COLON, DIAGNOSTIC      EYE SURGERY      [place] with IOL OU / [address]    umbilical hernia repair    IR PORT PLACEMENT EQUAL OR GREATER THAN 5 YEARS  5/14/2021    IR PORT PLACEMENT EQUAL OR GREATER THAN 5 YEARS 5/14/2021 MLOZ SPECIAL PROCEDURE    ME ESOPHAGOGASTRODUODENOSCOPY TRANSORAL DIAGNOSTIC N/A 03/24/2017    EGD ESOPHAGOGASTRODUODENOSCOPY performed by Tutu Portillo MD at [place] [address] 02/08/2018    negative findings    ME REVISE MEDIAN N/CARPAL TUNNEL SURG Left 06/05/2017    LEFT  CARPAL TUNNEL RELEASE performed by Smita Beal MD at [facility] [id number] N/A 02/08/2018    TONSILLECTOMY      as child    TUNNELED VENOUS PORT PLACEMENT Right 05/14/2021    8 Fr [employer] Port ClearVUE by Dr. Shay Suazo  08/26/2016    w/bx     UPPER GASTROINTESTINAL ENDOSCOPY N/A 02/25/2020 EGD possible biopsy performed by Kacey Benites MD at [address] [place] [address] N/A 2020    EGD PUSH ENTEROSCOPY performed by Nate Le MD at [facility] Marital status:      Spouse name: Brett Reece Number of children: 2    Years of education: 12    Highest education level: High school graduate   Occupational History    Occupation: Retired-   Tobacco Use    Smoking status: Former Smoker     Packs/day: 1.00     Years: 59.00     Pack years: 59.00     Types: Cigarettes     Start date: 2017     Quit date: 10/1/2020     Years since quittin.7    Smokeless tobacco: Never Used   Vaping Use    Vaping Use: Never used   Substance and Sexual Activity    Alcohol use: Not Currently    Drug use: No    Sexual activity: Yes     Partners: Male   Other Topics Concern    Not on file   Social History Narrative    Grew up in [address] [place] [address] With:  [id number] Spouse    Type of Home: House    Home Layout: Two level, Performs ADL's on one level    Home Access: Stairs to enter with rails    Entrance Stairs - Number of Steps: 4    Bathroom Shower/Tub: Tub/Shower unit, Doors    Bathroom Equipment: Shower chair, Grab bars in [facility]: Rolling walker, Cane, Oxygen(uses her O2 very seldom)    ADL Assistance: Needs assistance    Homemaking Assistance: Needs assistance    Homemaking Responsibilities: No(spouse performs)    Ambulation Assistance: Independent    Transfer Assistance: Independent    Active : No    Occupation: Retired    Type of occupation: worked in [employer]: patient enjoys [address] [employer] Strain: Low Risk     Difficulty of Paying Living Expenses: Not hard at [facility] Insecurity: No Food Insecurity    Worried About [address] [employer] in the Last Year: Never true    [address] [facility] [address] in the Last Year: Never true   Transportation Needs: No Transportation Needs    Lack of Transportation (Medical): No    Lack of Transportation (Non-Medical): No   Physical Activity: Inactive    Days of Exercise per Week: 0 days    Minutes of Exercise per Session: 0 min   Stress: No Stress Concern Present    Feeling of Stress : Only a little   Social Connections:  Moderately Integrated    Frequency of Communication with Friends and Family: More than three times a week    Frequency of Social Gatherings with Friends and Family: More than three times a week    Attends Hoahaoism Services: 1 to 4 times per year    Active Member of [employer] or Organizations: No    Attends Club or Organization Meetings: Never    Marital Status: Living with partner   Intimate Partner Violence: Not At Risk    Fear of Current or Ex-Partner: No    Emotionally Abused: No    Physically Abused: No    Sexually Abused: No     Family History   Problem Relation Age of Onset    Heart Disease Father         cardiac bypass    Arthritis Father     Arthritis Mother     Other Mother          at age 80    Other Sister         [facility] health     No Known Problems Daughter     Stroke Son      Allergies   Allergen Reactions    Ibuprofen Nausea Only    Metformin And Related      Diarrhea      Darvon [Propoxyphene Hcl] Nausea And Vomiting     Current Outpatient Medications on File Prior to Visit   Medication Sig Dispense Refill    lidocaine-prilocaine (EMLA) 2.5-2.5 % cream APPLY CREAM TOPICALLY TO PORT SITE ONE HOUR PRIOR TO APPOINTMENT AS NEEDED      Handicap Placard MISC by Does not apply route Good from 7/15/21 - 7/15/26 1 each 0    ACCU-CHEK PHYLLIS PLUS strip Test 3x daily Dx E11.65 100 each 3    insulin glargine (LANTUS) 100 UNIT/ML injection vial 60 units at bedtime 3 vial 3    rosuvastatin (CRESTOR) 40 MG tablet TAKE 1 TABLET BY MOUTH ONCE DAILY IN THE EVENING 90 tablet 3    carvedilol (COREG) 25 MG tablet Take 1.5 tablets by mouth 2 times daily (with meals) 270 tablet 2    albuterol-ipratropium (COMBIVENT RESPIMAT)  MCG/ACT AERS inhaler Inhale 1 puff into the lungs every 6 hours as needed for Wheezing or Shortness of Breath 4 g 3    pantoprazole (PROTONIX) 40 MG tablet Take 1 tablet by mouth every morning (before breakfast) 90 tablet 1    aspirin 81 MG EC tablet Take 81 mg by mouth daily (Patient not taking: Reported on 6/1/2021)      ipratropium-albuterol (DUONEB) 0.5-2.5 (3) MG/3ML SOLN nebulizer solution Inhale 3 mLs into the lungs 3 times daily 360 mL 0    levETIRAcetam (KEPPRA) 500 MG tablet Take 1 tablet by mouth 2 times daily 60 tablet 3    insulin lispro (HUMALOG) 100 UNIT/ML injection vial 18 units at each mels 1 vial 3    verapamil (CALAN SR) 120 MG extended release tablet Take 1 tablet by mouth daily 90 tablet 3    PARoxetine (PAXIL) 40 MG tablet TAKE 1 TABLET BY MOUTH ONCE DAILY IN THE MORNING 90 tablet 1    Accu-Chek Softclix Lancets MISC bid 100 each 3    Blood Glucose Monitoring Suppl (ACCU-CHEK PHYLLIS CONNECT) w/Device KIT 1 kit by Does not apply route daily 1 kit 0    Continuous Blood Gluc Sensor (FREESTYLE MURALI 14 DAY SENSOR) MISC Use every 2 weeks 2 each 1    Continuous Blood Gluc  (FREESTYLE MURALI 14 DAY READER) [place] As directed 1 Device 0     No current facility-administered medications on file prior to visit. Review of Systems   Constitutional: Positive for fatigue. Negative for activity change, appetite change, fever and unexpected weight change. HENT: Negative for congestion, trouble swallowing and voice change. Respiratory: Positive for cough and shortness of breath (with exertion). Negative for wheezing. Cardiovascular: Negative for chest pain and leg swelling. Gastrointestinal: Negative for abdominal distention, blood in stool, constipation, diarrhea and nausea. Genitourinary: Negative for dysuria, frequency, pelvic pain, urgency and vaginal discharge.    Musculoskeletal: Positive for back pain and gait problem. Skin: Negative. Neurological: Negative for dizziness and speech difficulty. Psychiatric/Behavioral: Negative for confusion, dysphoric mood and sleep disturbance. The patient is not nervous/anxious. Objective:   BP (!) 94/52   Pulse 54   Temp 98.8 °F (37.1 °C)   LMP  (LMP Unknown)   SpO2 94%    Wt Readings from Last 3 Encounters:   05/25/21 141 lb (64 kg)   05/14/21 141 lb (64 kg)   05/12/21 141 lb (64 kg)     Physical Exam  Constitutional:       General: She is not in acute distress. Appearance: Normal appearance. HENT:      Head: Normocephalic and atraumatic. Eyes:      General: No scleral icterus. Right eye: No discharge. Left eye: No discharge. Extraocular Movements: Extraocular movements intact. Conjunctiva/sclera: Conjunctivae normal.   Cardiovascular:      Rate and Rhythm: Regular rhythm. Bradycardia present. Heart sounds: Normal heart sounds. Pulmonary:      Effort: Pulmonary effort is normal.      Breath sounds: Normal breath sounds. No wheezing. Abdominal:      General: Bowel sounds are normal.      Palpations: Abdomen is soft. Musculoskeletal:      Cervical back: Normal range of motion and neck supple. Right lower leg: No edema. Left lower leg: No edema. Skin:     General: Skin is warm and dry. Neurological:      Mental Status: She is alert. Mental status is at baseline. Psychiatric:         Mood and Affect: Mood normal.         Behavior: Behavior normal.         Assessment and Plan:        1. Chronic obstructive pulmonary disease, unspecified COPD type ([address]  Continue COPD directed therapies. Call for increased SOB, cough, sputum production, excessive fatigue, fever, chills, or myalgias. 2. MENDEZ (dyspnea on exertion)  Discussed SOB as likely patient's new baseline. This has been ongoing with exertion, but patient has no other signs of infection/exacerbation or overload.    Discussed possible causes COPD and CHF. Lungs remain clear. No other red flags. 3. Acute combined systolic and diastolic CHF  Monitor weight daily, call if you gain 3 lbs in one day or 5 lbs. in one week or for increased edema, sob, cough, orthopnea, or chest pain. 4. Chronic right-sided thoracic back pain  Patient would be open to trying a lidocaine patch to her right thoracic back area. Can still take tylenol 650 mg q 6 hours if needed. Continue to avoid NSAIDs due to CKD. 5. Fatigue  Take paroxetine at night instead of in the morning to see if this helps alleviate some of patient's daytime fatigue. 6. Palliative care encounter  Explained the role of palliative care in treating patient. Discussed symptom management related to chronic disease/condition. Provided emotional support and active listening. Patient/family understand and are agreeable to current plan. All questions answered. Due to acuity, symptomatology and high-risk medication management, I advised patient to Return in about 1 month (around 8/19/2021). Total Time 50 minutes     Total time includes reviewing tests, reviewing history, medications, and allergies, counseling patient, performing medically appropriate evaluation and examination, ordering tests, ordering medications, and documenting in the medical record.        JOHNATHON De La Torre - CNP    Collaborating physician: Dr. Jt Koenig

## 2021-08-06 ENCOUNTER — OFFICE VISIT (OUTPATIENT)
Dept: CARDIOLOGY CLINIC | Age: 76
End: 2021-08-06
Payer: MEDICARE

## 2021-08-06 VITALS
HEART RATE: 83 BPM | SYSTOLIC BLOOD PRESSURE: 113 MMHG | WEIGHT: 152 LBS | HEIGHT: 59 IN | RESPIRATION RATE: 16 BRPM | DIASTOLIC BLOOD PRESSURE: 60 MMHG | OXYGEN SATURATION: 98 % | BODY MASS INDEX: 30.64 KG/M2

## 2021-08-06 DIAGNOSIS — K92.2 GASTROINTESTINAL HEMORRHAGE, UNSPECIFIED GASTROINTESTINAL HEMORRHAGE TYPE: ICD-10-CM

## 2021-08-06 DIAGNOSIS — I10 ESSENTIAL HYPERTENSION: ICD-10-CM

## 2021-08-06 DIAGNOSIS — E78.2 MIXED HYPERLIPIDEMIA: ICD-10-CM

## 2021-08-06 DIAGNOSIS — I50.41 ACUTE COMBINED SYSTOLIC AND DIASTOLIC CHF, NYHA CLASS 1 (HCC): Primary | ICD-10-CM

## 2021-08-06 DIAGNOSIS — I42.1 HOCM (HYPERTROPHIC OBSTRUCTIVE CARDIOMYOPATHY) (HCC): ICD-10-CM

## 2021-08-06 DIAGNOSIS — I25.10 CORONARY ARTERY DISEASE INVOLVING NATIVE CORONARY ARTERY OF NATIVE HEART WITHOUT ANGINA PECTORIS: ICD-10-CM

## 2021-08-06 PROCEDURE — 99214 OFFICE O/P EST MOD 30 MIN: CPT | Performed by: INTERNAL MEDICINE

## 2021-08-06 PROCEDURE — G8427 DOCREV CUR MEDS BY ELIG CLIN: HCPCS | Performed by: INTERNAL MEDICINE

## 2021-08-06 PROCEDURE — 4040F PNEUMOC VAC/ADMIN/RCVD: CPT | Performed by: INTERNAL MEDICINE

## 2021-08-06 PROCEDURE — G8400 PT W/DXA NO RESULTS DOC: HCPCS | Performed by: INTERNAL MEDICINE

## 2021-08-06 PROCEDURE — 1123F ACP DISCUSS/DSCN MKR DOCD: CPT | Performed by: INTERNAL MEDICINE

## 2021-08-06 PROCEDURE — 1036F TOBACCO NON-USER: CPT | Performed by: INTERNAL MEDICINE

## 2021-08-06 PROCEDURE — 1090F PRES/ABSN URINE INCON ASSESS: CPT | Performed by: INTERNAL MEDICINE

## 2021-08-06 PROCEDURE — G8417 CALC BMI ABV UP PARAM F/U: HCPCS | Performed by: INTERNAL MEDICINE

## 2021-08-06 RX ORDER — AMLODIPINE BESYLATE 5 MG/1
5 TABLET ORAL DAILY
Qty: 90 TABLET | Refills: 1 | Status: SHIPPED | OUTPATIENT
Start: 2021-08-06 | End: 2022-01-24 | Stop reason: SDUPTHER

## 2021-08-06 RX ORDER — ASPIRIN 81 MG/1
81 TABLET ORAL DAILY
Qty: 30 TABLET | Refills: 3
Start: 2021-08-06 | End: 2022-02-11 | Stop reason: ALTCHOICE

## 2021-08-06 RX ORDER — PANTOPRAZOLE SODIUM 40 MG/1
40 TABLET, DELAYED RELEASE ORAL
Qty: 90 TABLET | Refills: 3 | Status: SHIPPED | OUTPATIENT
Start: 2021-08-06 | End: 2021-08-19 | Stop reason: SDUPTHER

## 2021-08-06 ASSESSMENT — ENCOUNTER SYMPTOMS
WHEEZING: 0
RESPIRATORY NEGATIVE: 1
GASTROINTESTINAL NEGATIVE: 1
STRIDOR: 0
NAUSEA: 0
SHORTNESS OF BREATH: 0
BLOOD IN STOOL: 0
COUGH: 0
CHEST TIGHTNESS: 0
EYES NEGATIVE: 1

## 2021-08-06 NOTE — PROGRESS NOTES
Subsequent Progress Note  Patient: Alma Taylor  YOB: 1945  MRN: [id number]    Chief Complaint: htn HF SOB DM HOCM  Chief Complaint   Patient presents with    Follow-up     2 month    Congestive Heart Failure    Cardiomyopathy    Coronary Artery Disease    Hypertension     states home BP have been running high    Shortness of Breath     MENDEZ-chronic       CV Data:  3/2019 Echo EF 79% no KYLE   10/8/2019 Cath CX 20-30 LVEF 95%   10/2020 Echo EF 55 Moderate [facility]  10/5/2020 Cath LAD 50-60     Subjective/HPI: she stopped Verapamil 3 days ago due to severe fatigue and weakness. Feels better now. No cp no sob no bleed. C/o nocturnal leg cramps    3/27/2020 Patient and/or health care decision maker is aware that that he may receive a bill for this telephone service, depending on his insurance coverage, and has provided verbal consent to proceed. This visit was completed via telephone. Time spent on the phone with patient 18 minutes. She was to f/u with Dr. Zabrina Bermudez but was on my VV schedule today. No CP no SOB. Still has occasional night time leg cramps. She is walking and compliant with meds. No LE edema. 1/15/21 recent multiple hospitalization. She was intubated and in shock. Had TVP for Bradycardia but stabilized and did not require PPM.      3/11/21 TELEHEALTH EVALUATION -- Audio/Visual (During IFUXF-09 public health emergency)    No cp no sob no falls no bleed. Not walking much. Poor appetite.  /104. .. ran out of Verapamil and Clonidine. 3/18/21 Patient and/or health care decision maker is aware that that he/she may receive a bill for this telephone service, depending on his insurance coverage, and has provided verbal consent to proceed. This visit was completed via telephone. Total time 14 minutes. Had been doing well. This AM she took extra full dose Torsemde 50 mg because she thought she was not uriniating enough.    called in due to her BP being 79/56.  She is awake and alert sitting. No cp no sob. Just feels weak and tired     4/16/21 TELEHEALTH EVALUATION -- Audio/Visual (During VZIKL-96 public health emergency)    Recent resp failure intubated. No cp no sob no falls no bleed Bp stable. Her voice is hoarse from ET tube and swelling. She has no dysphagia    6/1/21 feels better. Recent hosp had intestinal bleed and taken off ASA>  She will have denat extraction o 6/26. She will stay off ASA for now. 8/6/21 legs weak. Walk little with lobo. No falls no bleed. Eeats well. Sleeps too much. Always Early AM BP elevated. EKG:    Past Medical History:   Diagnosis Date    Anxiety     Asthma     dx 2019 / has smoked since age 14    CHF (congestive heart failure) (HCC)     Chronic back pain     Bilateral L5 S1 Radic on emg--surprisingly worse on the left than the right--pt's symptoms and her MRI show worse on the right    Chronic obstructive pulmonary disease with acute exacerbation ([facility] [address] 10/12/2019    Depression     Fibromyalgia     Hyperlipidemia     meds > 8 yrs    Hypertension     meds > 45 yrs    On home O2     2l per n/c at bedtime mostly,     Osteoarthritis     Type II diabetes mellitus, uncontrolled (HCC)     hx > 8 yrs    Unspecified sleep apnea        Past Surgical History:   Procedure Laterality Date    BACK SURGERY  2017    lumbar disc    CARDIAC CATHETERIZATION  11/03/2014    DR. MIRELES / no stents    COLONOSCOPY  08/29/2016    w/polypectomy     COLONOSCOPY N/A 09/29/2020    COLONOSCOPY WITH POLYPECTOMY performed by Jane Buchanan MD at [facility] N/A 10/07/2019    EUA HYSTEROSCOPY DILATATION AND CURETTAGE performed by Beatris Mcleod DO at [facility]. [address], COLON, DIAGNOSTIC      EYE SURGERY      [place] with IOL OU / [id number]    umbilical hernia repair    IR PORT PLACEMENT EQUAL OR GREATER THAN 5 YEARS  5/14/2021    IR PORT PLACEMENT EQUAL OR GREATER THAN 5 YEARS 2021 [facility] SPECIAL PROCEDURE    MN ESOPHAGOGASTRODUODENOSCOPY TRANSORAL DIAGNOSTIC N/A 2017    EGD ESOPHAGOGASTRODUODENOSCOPY performed by Tutu Portillo MD at [facility] N/A 2018    negative findings    MN REVISE MEDIAN N/CARPAL TUNNEL SURG Left 2017    LEFT  CARPAL TUNNEL RELEASE performed by Smita Beal MD at [facility],2+P/M,TVQYF N/A 2018    TONSILLECTOMY      as child    TUNNELED VENOUS PORT PLACEMENT Right 2021    8 Fr [employer] Port ClearVUE by Dr. Shay Suazo  2016    w/bx     UPPER GASTROINTESTINAL ENDOSCOPY N/A 2020    EGD possible biopsy performed by Brendan Shi MD at [address] [place] [address] N/A 2020    EGD PUSH ENTEROSCOPY performed by Padmaja Peterson MD at [facility]       Family History   Problem Relation Age of Onset    Heart Disease Father         cardiac bypass    Arthritis Father     Arthritis Mother     Other Mother          at age 80    Other Sister         [facility]    No Known Problems Daughter     Stroke Son        Social History     Socioeconomic History    Marital status:      Spouse name: Latoya Boyd Number of children: 2    Years of education: 15    Highest education level: High school graduate   Occupational History    Occupation: Retired-   Tobacco Use    Smoking status: Former Smoker     Packs/day: 1.00     Years: 59.00     Pack years: 59.00     Types: Cigarettes     Start date: 2017     Quit date: 10/1/2020     Years since quittin.8    Smokeless tobacco: Never Used   Vaping Use    Vaping Use: Never used   Substance and Sexual Activity    Alcohol use: Not Currently    Drug use: No    Sexual activity: Yes     Partners: Male   Other Topics Concern    None   Social History Narrative    Grew up in [place] Metformin And Related      Diarrhea      Darvon [Propoxyphene Hcl] Nausea And Vomiting       Current Outpatient Medications   Medication Sig Dispense Refill    pantoprazole (PROTONIX) 40 MG tablet Take 1 tablet by mouth every morning (before breakfast) 90 tablet 3    amLODIPine (NORVASC) 5 MG tablet Take 1 tablet by mouth daily 90 tablet 1    PARoxetine (PAXIL) 40 MG tablet TAKE 1 TABLET BY MOUTH ONCE DAILY IN THE MORNING 90 tablet 1    lidocaine-prilocaine (EMLA) 2.5-2.5 % cream APPLY CREAM TOPICALLY TO PORT SITE ONE HOUR PRIOR TO APPOINTMENT AS NEEDED      Handicap Placard MISC by Does not apply route Good from 7/15/21 - 7/15/26 1 each 0    ACCU-CHEK PHYLLIS PLUS strip Test 3x daily Dx E11.65 100 each 3    insulin glargine (LANTUS) 100 UNIT/ML injection vial 60 units at bedtime 3 vial 3    rosuvastatin (CRESTOR) 40 MG tablet TAKE 1 TABLET BY MOUTH ONCE DAILY IN THE EVENING 90 tablet 3    carvedilol (COREG) 25 MG tablet Take 1.5 tablets by mouth 2 times daily (with meals) 270 tablet 2    albuterol-ipratropium (COMBIVENT RESPIMAT)  MCG/ACT AERS inhaler Inhale 1 puff into the lungs every 6 hours as needed for Wheezing or Shortness of Breath 4 g 3    ipratropium-albuterol (DUONEB) 0.5-2.5 (3) MG/3ML SOLN nebulizer solution Inhale 3 mLs into the lungs 3 times daily 360 mL 0    levETIRAcetam (KEPPRA) 500 MG tablet Take 1 tablet by mouth 2 times daily 60 tablet 3    insulin lispro (HUMALOG) 100 UNIT/ML injection vial 18 units at each mels 1 vial 3    verapamil (CALAN SR) 120 MG extended release tablet Take 1 tablet by mouth daily 90 tablet 3    Accu-Chek Softclix Lancets MISC bid 100 each 3    Blood Glucose Monitoring Suppl (ACCU-CHEK PHYLLIS CONNECT) w/Device KIT 1 kit by Does not apply route daily 1 kit 0    Continuous Blood Gluc Sensor (FREESTYLE MURALI 14 DAY SENSOR) MISC Use every 2 weeks 2 each 1    Continuous Blood Gluc  (FREESTYLE MURALI 14 DAY READER) [place] As directed 1 Device 0     No current facility-administered medications for this visit. Review of Systems:   Review of Systems   Constitutional: Negative. Negative for diaphoresis and fatigue. HENT: Negative. Eyes: Negative. Respiratory: Negative. Negative for cough, chest tightness, shortness of breath, wheezing and stridor. Cardiovascular: Negative. Negative for chest pain, palpitations and leg swelling. Gastrointestinal: Negative. Negative for blood in stool and nausea. Genitourinary: Negative. Musculoskeletal: Negative. Skin: Negative. Neurological: Negative. Negative for dizziness, syncope, weakness and light-headedness. Hematological: Negative. Psychiatric/Behavioral: Negative. Physical Examination:    /60 (Site: Left Upper Arm, Position: Sitting, Cuff Size: Medium Adult)   Pulse 83   Resp 16   Ht 4' 11\" (1.499 m)   Wt 152 lb (68.9 kg)   LMP  (LMP Unknown)   SpO2 98%   BMI 30.70 kg/m²    Physical Exam   Constitutional: She appears healthy. No distress. HENT:   Normal cephalic and Atraumatic   Eyes: Pupils are equal, round, and reactive to light. Neck: Thyroid normal. No JVD present. No neck adenopathy. No thyromegaly present. Cardiovascular: Normal rate, regular rhythm, normal heart sounds, intact distal pulses and normal pulses. Pulmonary/Chest: Effort normal and breath sounds normal. She has no wheezes. She has no rales. She exhibits no tenderness. Abdominal: Soft. Bowel sounds are normal. There is no abdominal tenderness. Musculoskeletal:         General: No tenderness or edema. Normal range of motion. Cervical back: Normal range of motion and neck supple. Neurological: She is alert and oriented to person, place, and time. Skin: Skin is warm. No cyanosis. Nails show no clubbing.        LABS:  CBC:   Lab Results   Component Value Date    WBC 4.4 06/24/2021    RBC 4.46 06/24/2021    HGB 12.2 06/24/2021    HCT 38.0 06/24/2021    MCV 85.1 06/24/2021    MCH 27.4 06/24/2021    MCHC 32.2 06/24/2021    RDW 18.1 06/24/2021     06/24/2021    MPV 8.8 08/01/2015     Lipids:  Lab Results   Component Value Date    CHOL 143 11/30/2020    CHOL 105 02/24/2020    CHOL 158 10/13/2019     Lab Results   Component Value Date    TRIG 64 11/30/2020    TRIG 67 02/24/2020    TRIG 118 10/13/2019     Lab Results   Component Value Date    HDL 61 (H) 11/30/2020    HDL 42 02/24/2020    HDL 61 (H) 10/13/2019     Lab Results   Component Value Date    LDLCALC 69 11/30/2020    LDLCALC 50 02/24/2020    LDLCALC 73 10/13/2019     No results found for: LABVLDL, VLDL  Lab Results   Component Value Date    CHOLHDLRATIO 3.4 09/11/2012     CMP:    Lab Results   Component Value Date     06/10/2021    K 5.1 06/10/2021    K 4.4 10/19/2020     06/10/2021    CO2 21 06/10/2021    BUN 36 06/10/2021    CREATININE 2.17 06/10/2021    GFRAA 26.6 06/10/2021    LABGLOM 22.0 06/10/2021    GLUCOSE 218 06/10/2021    PROT 6.2 04/29/2021    LABALBU 4.2 06/10/2021    CALCIUM 10.1 06/10/2021    BILITOT <0.2 04/29/2021    ALKPHOS 118 04/29/2021    AST 20 04/29/2021    ALT 12 04/29/2021     BMP:    Lab Results   Component Value Date     06/10/2021    K 5.1 06/10/2021    K 4.4 10/19/2020     06/10/2021    CO2 21 06/10/2021    BUN 36 06/10/2021    LABALBU 4.2 06/10/2021    CREATININE 2.17 06/10/2021    CALCIUM 10.1 06/10/2021    GFRAA 26.6 06/10/2021    LABGLOM 22.0 06/10/2021    GLUCOSE 218 06/10/2021     Magnesium:    Lab Results   Component Value Date    MG 1.7 04/01/2021     TSH:  Lab Results   Component Value Date    TSH 0.474 11/30/2020       Patient Active Problem List   Diagnosis    Hypertension    Hyperlipidemia    Uncontrolled type 2 diabetes mellitus with complication, without long-term current use of insulin (HCC)    Fibromyalgia    Anxiety    Smoker    Insomnia    DJD (degenerative joint disease), lumbar    Lumbosacral radiculopathy at S1    DDD (degenerative disc disease), Gastroesophageal reflux disease without esophagitis    Acute cystitis without hematuria    CKD (chronic kidney disease) stage 4, GFR 15-29 ml/min ([facility])    Cardiopulmonary arrest ([facility])    Gait abnormality    Late effects of CVA (cerebrovascular accident)    Cardiac arrest ([facility] [address]    Acute respiratory failure with hypoxia ([facility])    Goals of care, counseling/discussion    Bradycardia    Moderate hypoxic-ischemic encephalopathy    Infection due to ESBL-producing Escherichia coli    GI bleed    Port-A-Cath in place       Medications Discontinued During This Encounter   Medication Reason    aspirin 81 MG EC tablet LIST CLEANUP    lidocaine (LIDODERM) 5 % LIST CLEANUP    pantoprazole (PROTONIX) 40 MG tablet REORDER       Modified Medications    Modified Medication Previous Medication    PANTOPRAZOLE (PROTONIX) 40 MG TABLET pantoprazole (PROTONIX) 40 MG tablet       Take 1 tablet by mouth every morning (before breakfast)    Take 1 tablet by mouth every morning (before breakfast)       Orders Placed This Encounter   Medications    pantoprazole (PROTONIX) 40 MG tablet     Sig: Take 1 tablet by mouth every morning (before breakfast)     Dispense:  90 tablet     Refill:  3    amLODIPine (NORVASC) 5 MG tablet     Sig: Take 1 tablet by mouth daily     Dispense:  90 tablet     Refill:  1       Assessment/Plan:    1. Acute combined systolic and diastolic CHF, NYHA class 1 ([facility])   compensated   - Basic Metabolic Panel; Future    2. Essential hypertension   not controlled in AM-- will give Amlodipine 5 mg at [facility]. 3. Mixed hyperlipidemia     4. HOCM (hypertrophic obstructive cardiomyopathy) ([facility])  Continue BB ACEI. And Dig.     5. Hypotension- stable now    6. Resp failure- stable. Need Therapy. Need to walk daily. Eat well. Take meds. 7. Intestinal Bleed- staty off ASA- if Hb stable will consider resume in future.        Counseling:  Heart Healthy Lifestyle, Low Salt Diet, Take Precautions to Prevent Falls and Walk Daily    Return in about 3 months (around 11/6/2021).       Electronically signed by Marcos You MD on 8/6/2021 at 1:10 PM

## 2021-08-11 RX ORDER — BLOOD SUGAR DIAGNOSTIC
STRIP MISCELLANEOUS
Qty: 100 EACH | Refills: 3 | Status: SHIPPED | OUTPATIENT
Start: 2021-08-11 | End: 2021-12-27 | Stop reason: SDUPTHER

## 2021-08-11 NOTE — TELEPHONE ENCOUNTER
Rx request   Requested Prescriptions     Pending Prescriptions Disp Refills    ACCU-CHEK PHYLLIS PLUS strip 100 each 3     Sig: Test 3x daily Dx E11.65     LOV 3/17/2021  Next Visit Date:  Future Appointments   Date Time Provider Zaid Lr   8/12/2021 11:00 AM Eduarda Johnson MD [facility]   8/19/2021  2:00 PM JOHNATHON Aponte - CNP [facility]   11/5/2021  2:15 PM Philippe Walker MD [facility]

## 2021-08-12 ENCOUNTER — OFFICE VISIT (OUTPATIENT)
Dept: FAMILY MEDICINE CLINIC | Age: 76
End: 2021-08-12
Payer: MEDICARE

## 2021-08-12 VITALS
BODY MASS INDEX: 30.7 KG/M2 | OXYGEN SATURATION: 98 % | TEMPERATURE: 98.5 F | HEIGHT: 59 IN | HEART RATE: 81 BPM | SYSTOLIC BLOOD PRESSURE: 114 MMHG | DIASTOLIC BLOOD PRESSURE: 68 MMHG

## 2021-08-12 DIAGNOSIS — Z16.12 ESBL (EXTENDED SPECTRUM BETA-LACTAMASE) PRODUCING BACTERIA INFECTION: ICD-10-CM

## 2021-08-12 DIAGNOSIS — D50.0 IRON DEFICIENCY ANEMIA DUE TO CHRONIC BLOOD LOSS: ICD-10-CM

## 2021-08-12 DIAGNOSIS — J44.1 CHRONIC OBSTRUCTIVE PULMONARY DISEASE WITH ACUTE EXACERBATION (HCC): ICD-10-CM

## 2021-08-12 DIAGNOSIS — A49.9 ESBL (EXTENDED SPECTRUM BETA-LACTAMASE) PRODUCING BACTERIA INFECTION: ICD-10-CM

## 2021-08-12 DIAGNOSIS — N39.0 URINARY TRACT INFECTION DUE TO EXTENDED-SPECTRUM BETA LACTAMASE (ESBL) PRODUCING ESCHERICHIA COLI: ICD-10-CM

## 2021-08-12 DIAGNOSIS — Z16.12 URINARY TRACT INFECTION DUE TO EXTENDED-SPECTRUM BETA LACTAMASE (ESBL) PRODUCING ESCHERICHIA COLI: ICD-10-CM

## 2021-08-12 DIAGNOSIS — I50.42 CHRONIC COMBINED SYSTOLIC AND DIASTOLIC CHF (CONGESTIVE HEART FAILURE) (HCC): Primary | ICD-10-CM

## 2021-08-12 DIAGNOSIS — N18.4 STAGE 4 CHRONIC KIDNEY DISEASE (HCC): ICD-10-CM

## 2021-08-12 DIAGNOSIS — Q27.30 AVM (ARTERIOVENOUS MALFORMATION): ICD-10-CM

## 2021-08-12 DIAGNOSIS — B96.29 URINARY TRACT INFECTION DUE TO EXTENDED-SPECTRUM BETA LACTAMASE (ESBL) PRODUCING ESCHERICHIA COLI: ICD-10-CM

## 2021-08-12 DIAGNOSIS — F33.42 MAJOR DEPRESSIVE DISORDER, RECURRENT, IN FULL REMISSION (HCC): ICD-10-CM

## 2021-08-12 DIAGNOSIS — I42.1 HOCM (HYPERTROPHIC OBSTRUCTIVE CARDIOMYOPATHY) (HCC): ICD-10-CM

## 2021-08-12 PROCEDURE — G8427 DOCREV CUR MEDS BY ELIG CLIN: HCPCS | Performed by: FAMILY MEDICINE

## 2021-08-12 PROCEDURE — 1036F TOBACCO NON-USER: CPT | Performed by: FAMILY MEDICINE

## 2021-08-12 PROCEDURE — G8926 SPIRO NO PERF OR DOC: HCPCS | Performed by: FAMILY MEDICINE

## 2021-08-12 PROCEDURE — 99214 OFFICE O/P EST MOD 30 MIN: CPT | Performed by: FAMILY MEDICINE

## 2021-08-12 PROCEDURE — 3023F SPIROM DOC REV: CPT | Performed by: FAMILY MEDICINE

## 2021-08-12 PROCEDURE — 1123F ACP DISCUSS/DSCN MKR DOCD: CPT | Performed by: FAMILY MEDICINE

## 2021-08-12 PROCEDURE — G8417 CALC BMI ABV UP PARAM F/U: HCPCS | Performed by: FAMILY MEDICINE

## 2021-08-12 PROCEDURE — G8400 PT W/DXA NO RESULTS DOC: HCPCS | Performed by: FAMILY MEDICINE

## 2021-08-12 PROCEDURE — 4040F PNEUMOC VAC/ADMIN/RCVD: CPT | Performed by: FAMILY MEDICINE

## 2021-08-12 PROCEDURE — 1090F PRES/ABSN URINE INCON ASSESS: CPT | Performed by: FAMILY MEDICINE

## 2021-08-12 RX ORDER — ACETAMINOPHEN 160 MG
1000 TABLET,DISINTEGRATING ORAL 2 TIMES DAILY
COMMUNITY

## 2021-08-12 RX ORDER — CEPHALEXIN 500 MG/1
500 CAPSULE ORAL 2 TIMES DAILY
COMMUNITY
Start: 2021-08-08 | End: 2021-10-13

## 2021-08-12 NOTE — PROGRESS NOTES
Chief Complaint   Patient presents with    3 Month Follow-Up     Pt here for a 3 month f/up for gastrointestinal hemorrhage, pt states it has gotten better.  Mass     Pt has a boil on left side of abdomen. Taking Keflex for it, pt states antibiotic seems to be working. Not as painuful as before.  Health Maintenance     Declines DEXA at the moment. HPI: Jennifer Chowdhury 67 y.o. female presenting for         Anxiety and depression  Stable this time. Denies any suicidal homicidal ideations. Anemia  Mix of iron deficiency anemia and history of GI bleeds. Please read the last hospital encounter    Jennifer Chowdhury is a 67 y.o. female that was admitted and treated at [facility] for increasing lethargy and fatigue in the setting of chronic GI bleed. Patient has had multiple endoscopic evaluations in the past including a PillCam which showed some bleeding throughout the distal small intestine. She has been treated also has advancing CKD 3 which is likely contributing to her anemia at some point as well. She was recently admitted to [facility] with cardiac arrest and has been successfully discharged home. At this point in time she was transfused to a hemoglobin of 9.1 and she will be discharged with hematology follow-up. PICC line was placed as no one was able to place an IV in her while on admission this will be removed prior to discharge. She will follow up with hematology for hospital erythropoietin versus iron versus blood transfusions as needed. She also follows with nephrology due to her CKD. Of note the patient did have mild Trop elevation on arrival which is chronic and baseline and troponin's have been flat with no signs of acute coronary events. Patient was seen by hematology.  Marybel was told that it was too soon for patient to have any iron transfusion/blood transfusions since she just had one in the hospital. Patient was to have blood work done in may and then repeated 2 weeks afterwards. Family is concerned because  Patient has a history where her blood count drops every month due to the chronic bleed in her GI system and she needs to be admitted to the hospital.family would like to see if there is a way where her blood count can be checked monthly or where she can be evaluated more frequently through other measures. Per , patient was seen by palliative care last week. From their understanding, they would resume all care and will not need any help from the specialist.  Family wants all of the care teams involved. Patient denies any fevers, chills, nausea, vomiting, chest pain, change in her shortness of breath, change in her stools. Follow-up  Patient is doing much better. Patient has her blood checked routinely. And has been established with hematology. Patient also follows up with gastroenterology. Per family, since she has been with palliative care she has been able to relate any concerns with them. Colonization of ESBL E. coli    Patient was recently treated for urinary tract infection by her primary care provider. At that time patient was given antibiotics and was told to have a repeat urinary culture for further evaluation. Patient also has chronic abdominal pain that was seen and had a CT scan in the hospital.  Patient is a follow-up with her hematologist in June. Follow-up  Was evaluated by infectious disease. Stated that patient does not need any antibiotics or anything at this time as she is likely been colonized with the bacteria. Currently patient has no issues or concerns. Denies any fevers, chills, nausea, vomiting, chest pain, shortness of breath, abdominal pain, urination, changes in stools. CHF  Euvolemic and at baseline. COPD  Patient is on chronic oxygen. Uses 2 L at night. No issues or concerns. Able to lay flat at bed. Boil on abdomen. Patient was seen on the walk-in clinic.   Was given antibiotics. Does feel like there is improving. Denies any fevers, chills, nausea, vomiting, chest pain, shortness of breath, abdominal pain, urination, change in stools.       Review of systems  Negative except for what is stated in HPI      Current Outpatient Medications   Medication Sig Dispense Refill    cephALEXin (KEFLEX) 500 MG capsule Take 500 mg by mouth 2 times daily      Cranberry 500 MG TABS Take 500 mg by mouth Daily      Cholecalciferol (VITAMIN D3) 50 MCG (2000 UT) CAPS Take 2,000 Units by mouth Daily      ACCU-CHEK PHYLLIS PLUS strip Test 3x daily Dx E11.65 100 each 3    pantoprazole (PROTONIX) 40 MG tablet Take 1 tablet by mouth every morning (before breakfast) 90 tablet 3    amLODIPine (NORVASC) 5 MG tablet Take 1 tablet by mouth daily 90 tablet 1    aspirin 81 MG EC tablet Take 1 tablet by mouth daily 30 tablet 3    PARoxetine (PAXIL) 40 MG tablet TAKE 1 TABLET BY MOUTH ONCE DAILY IN THE MORNING 90 tablet 1    lidocaine-prilocaine (EMLA) 2.5-2.5 % cream APPLY CREAM TOPICALLY TO PORT SITE ONE HOUR PRIOR TO APPOINTMENT AS NEEDED      Handicap Placard MISC by Does not apply route Good from 7/15/21 - 7/15/26 1 each 0    insulin glargine (LANTUS) 100 UNIT/ML injection vial 60 units at bedtime 3 vial 3    rosuvastatin (CRESTOR) 40 MG tablet TAKE 1 TABLET BY MOUTH ONCE DAILY IN THE EVENING 90 tablet 3    carvedilol (COREG) 25 MG tablet Take 1.5 tablets by mouth 2 times daily (with meals) 270 tablet 2    albuterol-ipratropium (COMBIVENT RESPIMAT)  MCG/ACT AERS inhaler Inhale 1 puff into the lungs every 6 hours as needed for Wheezing or Shortness of Breath 4 g 3    ipratropium-albuterol (DUONEB) 0.5-2.5 (3) MG/3ML SOLN nebulizer solution Inhale 3 mLs into the lungs 3 times daily 360 mL 0    insulin lispro (HUMALOG) 100 UNIT/ML injection vial 18 units at each mels 1 vial 3    Accu-Chek Softclix Lancets MISC bid 100 each 3    Blood Glucose Monitoring Suppl (ACCU-CHEK PHYLLIS CONNECT) YEARS  2021    IR PORT PLACEMENT EQUAL OR GREATER THAN 5 YEARS 2021 MLOZ SPECIAL PROCEDURE    NC ESOPHAGOGASTRODUODENOSCOPY TRANSORAL DIAGNOSTIC N/A 2017    EGD ESOPHAGOGASTRODUODENOSCOPY performed [facility]ena [facility]ines [facility] at [facility] N/A 2018    negative findings    NC REVISE MEDIAN N/CARPAL TUNNEL SURG Left 2017    LEFT  CARPAL TUNNEL RELEASE performed by Arsalan Avendaño MD at [facility],6+R/P,IW[facility] N/A 2018    TONSILLECTOMY      as child    TUNNELED VENOUS PORT PLACEMENT Right 2021    8 Fr [employer] Power Port ClearVUE by Dr. Rossana Gonsalves  2016    w/bx     UPPER GASTROINTESTINAL ENDOSCOPY N/A 2020    EGD possible biopsy performed by Jane Buchanan MD at [address] [place] [address] N/A 2020    EGD PUSH ENTEROSCOPY performed by Dottie Padilla MD at [facility]        Family History   Problem Relation Age of Onset    Heart Disease Father         cardiac bypass    Arthritis Father     Arthritis Mother     Other Mother          at age 80    Other Sister         [facility]    No Known Problems Daughter     Stroke Son         Social History     Socioeconomic History    Marital status:      Spouse name: Radha Berrios Number of children: 2    Years of education: 15    Highest education level: High school graduate   Occupational History    Occupation: Retired-   Tobacco Use    Smoking status: Former Smoker     Packs/day: 1.00     Years: 59.00     Pack years: 59.00     Types: Cigarettes     Start date: 2017     Quit date: 10/1/2020     Years since quittin.8    Smokeless tobacco: Never Used   Vaping Use    Vaping Use: Never used   Substance and Sexual Activity    Alcohol use: Not Currently    Drug use: No    Sexual activity: Yes     Partners: Male   Other Topics Concern    Not on file   Social History Narrative    Grew up in [place]     Lives With:  [id number] Spouse    Type of Home: House    Home Layout: Two level, Performs ADL's on one level    Home Access: Stairs to enter with rails    Entrance Stairs - Number of Steps: 4    Bathroom Shower/Tub: Tub/Shower unit, Doors    Bathroom Equipment: Shower chair, Grab bars in [facility]: Rolling walker, Cane, Oxygen(uses her O2 very seldom)    ADL Assistance: Needs assistance    Homemaking Assistance: Needs assistance    Homemaking Responsibilities: No(spouse performs)    Ambulation Assistance: Independent    Transfer Assistance: Independent    Active : No    Occupation: Retired    Type of occupation: worked in [employer]: patient enjoys [address] [facility] [address] Strain: Low Risk     Difficulty of Paying Living Expenses: Not hard at [employer] Insecurity: No Food Insecurity    Worried About [facility] in the Last Year: Never true    [address] [place] in the Last Year: Never true   Transportation Needs: No Transportation Needs    Lack of Transportation (Medical): No    Lack of Transportation (Non-Medical): No   Physical Activity: Inactive    Days of Exercise per Week: 0 days    Minutes of Exercise per Session: 0 min   Stress: No Stress Concern Present    Feeling of Stress : Only a little   Social Connections:  Moderately Integrated    Frequency of Communication with Friends and Family: More than three times a week    Frequency of Social Gatherings with Friends and Family: More than three times a week    Attends [employer] Services: 1 to 4 times per year    Active Member of [employer] or Organizations: No    Attends Club or Organization Meetings: Never    Marital Status: Living with partner   Intimate Partner Violence: Not At Risk    Fear of Current or Ex-Partner: No    Emotionally Abused: No    Physically Abused: No    Sexually Abused: No        /68 beta-lactamase) producing bacteria infection  Colonized. No treatment at this time     5. Urinary tract infection due to extended-spectrum beta lactamase (ESBL) producing Escherichia coli  Resolved after last abx     6. Stage 4 chronic kidney disease,  Lab Results   Component Value Date     06/10/2021    K 5.1 (H) 06/10/2021     06/10/2021    CO2 21 06/10/2021    BUN 36 (H) 06/10/2021    CREATININE 2.17 (H) 06/10/2021    GLUCOSE 218 (H) 06/10/2021    CALCIUM 10.1 (H) 06/10/2021    PROT 6.2 (L) 04/29/2021    LABALBU 4.2 06/10/2021    BILITOT <0.2 04/29/2021    ALKPHOS 118 04/29/2021    AST 20 04/29/2021    ALT 12 04/29/2021    LABGLOM 22.0 (L) 06/10/2021    GFRAA 26.6 (L) 06/10/2021    GLOB 2.7 04/29/2021           7. AVM (arteriovenous malformation)      8. Iron deficiency anemia due to chronic blood loss  Chronic blood loss. Followed by palliative care as well as hematology. Stable at this time      9. Major depressive disorder, recurrent, in full remission ([facility] [address]  Stable. Please note, this report has been partially produced using speech recognition software  and may cause  and /or contain errors related to that system including grammar, punctuation and spelling as well as words and phrases that may seem inappropriate. If there are questions or concerns please feel free to contact me to clarify.

## 2021-08-12 NOTE — PROGRESS NOTES
Chief Complaint   Patient presents with    3 Month Follow-Up     Pt here for a 3 month f/up for gastrointestinal hemorrhage, pt states it has gotten better.  Mass     Pt has a boil on left side of abdomen. Taking Keflex for it, pt states antibiotic seems to be working. Not as painuful as before.  Health Maintenance     Declines DEXA at the moment. HPI: Nithin Wilkes 67 y.o. female presenting for    Colonized with klebisella   Organism Abnormal  06/24/2021 11:50 AM [address] [facility]   Escherichia coli ESBL    Urine Culture, Routine Abnormal  06/24/2021 11:50 AM  - [facility]   50,000 CFU/ml   CONTACT PRECAUTIONS INDICATED   Carbapenem antibiotics are the best option for infections caused   by ESBL producing organisms.  Other antibiotic classes are   likely to result in treatment failure, even for organisms   demonstrating in vitro susceptibility. Testing Performed By          This is the discharge note from the hospital.       Brandon Martins a 67 y. o. female that was admitted and treated at [facility] for increasing lethargy and fatigue in the setting of chronic GI bleed.  Patient has had multiple endoscopic evaluations in the past including a PillCam which showed some bleeding throughout the distal small intestine.  She has been treated also has advancing CKD 3 which is likely contributing to her anemia at some point as well.  She was recently admitted to [facility] with cardiac arrest and has been successfully discharged home.  At this point in time she was transfused to a hemoglobin of 9.1 and she will be discharged with hematology follow-up. [facility] line was placed as no one was able to place an IV in her while on admission this will be removed prior to discharge. Nohemi Bello will follow up with hematology for hospital erythropoietin versus iron versus blood transfusions as needed.  She also follows with nephrology due to her CKD.  Of note kg)          BP Readings from Last 3 Encounters:   05/12/21 122/60   05/06/21 121/70   04/30/21 (!) 172/82         Physical Exam:  General Appearance: alert and oriented to person, place and time, well developed and well- nourished, in no acute distress, in wheelchair  Skin: warm and dry, no rash or erythema  Head: normocephalic and atraumatic  Eyes: pupils equal, round, and reactive to light, extraocular eye movements intact, conjunctivae normal  ENT: tympanic membrane, external ear and ear canal normal bilaterally, nose without deformity, nasal mucosa and turbinates normal without polyps  Neck: supple and non-tender without mass, no thyromegaly or thyroid nodules, no cervical lymphadenopathy  Pulmonary/Chest: Distant heard. Cardiovascular: normal rate, regular rhythm, normal S1 and S2, no murmurs, rubs, clicks, or gallops, distal pulses intact, no carotid bruits  Abdomen: Tenderness palpation on the right side of the abdomen. Extremities: no cyanosis, clubbing or edema  Musculoskeletal: normal range of motion, no joint swelling, deformity or tenderness  Neurologic: reflexes normal and symmetric, no cranial nerve deficit, gait, coordination and speech normal     Assessment/Plan:  1. Gastrointestinal hemorrhage, unspecified gastrointestinal hemorrhage type  Patient has a chronic GI bleed. Repeat CBC. Release records to my chart for patient to send to her hematologist.  Patient had a recent blood transfusion making her not a candidate for any iron transfusions at this time. Patient does have a follow-up with her hematologist soon. Advised patient and family to keep the appointment. I also encourage patient to follow-up with palliative care. There should be options for patient to have palliative care and still have her specialist be involved. Will speak to care coordination regards to this. - CA DISCHARGE MEDS RECONCILED W/ CURRENT OUTPATIENT MED LIST  - CBC With Auto Differential; Future     2.  Urinary [facility] 14 DAY SENSOR) [facility] 2 weeks 2 each 1    Continuous Blood Gluc  (FREESTYLE MURALI 14 DAY READER) [place] As directed 1 Device 0    levETIRAcetam (KEPPRA) 500 MG tablet Take 1 tablet by mouth 2 times daily (Patient not taking: Reported on 8/12/2021) 60 tablet 3    verapamil (CALAN SR) 120 MG extended release tablet Take 1 tablet by mouth daily (Patient not taking: Reported on 8/12/2021) 90 tablet 3     No current facility-administered medications for this visit. ROS***  CONSTITUTIONAL: The patient denies fevers, chills, sweats and body ache. HEENT: Denies headache, blurry vision, eye pain, tinnitus, vertigo,  sore throat, neck or thyroid masses. RESPIRATORY: Denies cough, sputum, hemoptysis. CARDIAC: Denies chest pain, pressure, palpitations, Denies lower extremity edema. GASTROINTESTINAL: Denies abdominal pain, constipation, diarrhea, bleeding in the stools,   GENITOURINARY: Denies dysuria, hematuria, nocturia or frequency, urinary incontinence. NEUROLOGIC: Denies headaches, dizziness, syncope, weakness  MUSCULOSKELETAL: denies changes in range of motion, joint pain, stiffness. ENDOCRINOLOGY: Denies heat or cold intolerance. HEMATOLOGY: Denies easy bleeding or blood transfusion,anemia  DERMATOLOGY: Denies changes in moles or pigmentation changes. PSYCHIATRY: Denies depression, agitation or anxiety.     Past Medical History:   Diagnosis Date    Anxiety     Asthma     dx 2019 / has smoked since age 14    CHF (congestive heart failure) (HCC)     Chronic back pain     Bilateral L5 S1 Radic on emg--surprisingly worse on the left than the right--pt's symptoms and her MRI show worse on the right    Chronic obstructive pulmonary disease with acute exacerbation ([facility] [address] 10/12/2019    Depression     Fibromyalgia     Hyperlipidemia     meds > 8 yrs    Hypertension     meds > 45 yrs    On home O2     2l per n/c at bedtime mostly,     Osteoarthritis     Type II diabetes mellitus, uncontrolled ([address]     hx > 8 yrs    Unspecified sleep apnea         Past Surgical History:   Procedure Laterality Date    BACK SURGERY  2017    lumbar disc    CARDIAC CATHETERIZATION  2014    DR. MIRELES / no stents    COLONOSCOPY  2016    w/polypectomy     COLONOSCOPY N/A 2020    COLONOSCOPY WITH POLYPECTOMY [facility]leni [facility]agen [facility] at [facility] N/A 10/07/2019    EUA HYSTEROSCOPY DILATATION AND CURETTAGE performed by Livia Gaspar DO at [address] [place] [address] ENDOSCOPY, COLON, DIAGNOSTIC      EYE SURGERY      [place] with IOL OU / [address] [place]  [address]    umbilical hernia repair    IR PORT PLACEMENT EQUAL OR GREATER THAN 5 YEARS  2021    IR PORT PLACEMENT EQUAL OR GREATER THAN 5 YEARS 2021 MLOZ SPECIAL PROCEDURE    [place] ESOPHAGOGASTRODUODENOSCOPY TRANSORAL DIAGNOSTIC N/A 2017    EGD ESOPHAGOGASTRODUODENOSCOPY performed by Sebastián Alaniz MD at [place] [address] 2018    negative findings    [place] REVISE MEDIAN N/CARPAL TUNNEL SURG Left 2017    LEFT  CARPAL TUNNEL RELEASE performed by Daylin Brandt MD at [facility]J,[facility] N/A 2018    TONSILLECTOMY      as child    TUNNELED VENOUS PORT PLACEMENT Right 2021    8 Fr [employer] Port ClearVUE by Dr. Wen Casey  2016    w/bx     UPPER GASTROINTESTINAL ENDOSCOPY N/A 2020    EGD possible biopsy performed by [facility]leni [facility]agen MD at  [facility] N/A 2020    EGD PUSH ENTEROSCOPY performed [facility] Poe [facility] at [facility]        Family History   Problem Relation Age of Onset    Heart Disease Father         cardiac bypass    Arthritis Father     Arthritis Mother     Other Mother          at age 80    Other Sister         nuknown health hx    No Known Problems Daughter     Stroke Son         Social History     Socioeconomic History    Marital status:      Spouse name: Marlene Garcia Number of children: 2    Years of education: 15    Highest education level: High school graduate   Occupational History    Occupation: Retired-   Tobacco Use    Smoking status: Former Smoker     Packs/day: 1.00     Years: 59.00     Pack years: 59.00     Types: Cigarettes     Start date: 2017     Quit date: 10/1/2020     Years since quittin.8    Smokeless tobacco: Never Used   Vaping Use    Vaping Use: Never used   Substance and Sexual Activity    Alcohol use: Not Currently    Drug use: No    Sexual activity: Yes     Partners: Male   Other Topics Concern    Not on file   Social History Narrative    Grew up in [address] [place] [address] With:  [id number] Spouse    Type of Home: House    Home Layout: Two level, Performs ADL's on one level    Home Access: Stairs to enter with rails    Entrance Stairs - Number of Steps: 4    Bathroom Shower/Tub: Tub/Shower unit, Doors    Bathroom Equipment: Shower chair, Grab bars in [facility]: Rolling walker, Cane, Oxygen(uses her O2 very seldom)    ADL Assistance: Needs assistance    Homemaking Assistance: Needs assistance    Homemaking Responsibilities: No(spouse performs)    Ambulation Assistance: Independent    Transfer Assistance: Independent    Active : No    Occupation: Retired    Type of occupation: worked in [employer]: patient enjoys [address] [place] [address] [employer] Strain: Low Risk     Difficulty of Paying Living Expenses: Not hard at [employer] Insecurity: No Food Insecurity    Worried About [facility] in the Last Year: Never true    [address] in the Last Year: Never true   Transportation Needs: No Transportation Needs    Lack of Transportation (Medical): No    Lack of Transportation (Non-Medical):  No   Physical Activity: Inactive    Days of edema, no clubbing or cyanosis   Skin - normal coloration and turgor, no rashes, no suspicious skin lesions noted    {PHYSICAL EXAM WITH PROVIDER CHOICES:81041}    Labs   TSH   Date Value Ref Range Status   08/27/2020 0.777 0.440 - 3.860 uIU/mL Final     TSH   Date Value Ref Range Status   11/30/2020 0.474 0.440 - 3.860 uIU/mL Final   10/05/2020 1.070 0.440 - 3.860 uIU/mL Final   10/13/2019 1.200 0.440 - 3.860 uIU/mL Final   05/03/2014 0.421 0.270 - 4.200 uIU/mL Final           A/P: Chan Roy 67 y.o. female presenting for ***     There are no diagnoses linked to this encounter. Please note, this report has been partially produced using speech recognition software  and may cause  and /or contain errors related to that system including grammar, punctuation and spelling as well as words and phrases that may seem inappropriate. If there are questions or concerns please feel free to contact me to clarify.

## 2021-08-19 ENCOUNTER — OFFICE VISIT (OUTPATIENT)
Dept: PALLATIVE CARE | Age: 76
End: 2021-08-19
Payer: MEDICARE

## 2021-08-19 VITALS
SYSTOLIC BLOOD PRESSURE: 129 MMHG | DIASTOLIC BLOOD PRESSURE: 67 MMHG | HEART RATE: 94 BPM | TEMPERATURE: 95.1 F | OXYGEN SATURATION: 97 %

## 2021-08-19 DIAGNOSIS — Z51.5 PALLIATIVE CARE ENCOUNTER: ICD-10-CM

## 2021-08-19 DIAGNOSIS — J44.9 CHRONIC OBSTRUCTIVE PULMONARY DISEASE, UNSPECIFIED COPD TYPE (HCC): Primary | ICD-10-CM

## 2021-08-19 DIAGNOSIS — I50.41 ACUTE COMBINED SYSTOLIC AND DIASTOLIC CHF, NYHA CLASS 1 (HCC): ICD-10-CM

## 2021-08-19 DIAGNOSIS — M54.6 CHRONIC RIGHT-SIDED THORACIC BACK PAIN: ICD-10-CM

## 2021-08-19 DIAGNOSIS — M47.816 OSTEOARTHRITIS OF LUMBAR SPINE, UNSPECIFIED SPINAL OSTEOARTHRITIS COMPLICATION STATUS: ICD-10-CM

## 2021-08-19 DIAGNOSIS — G89.29 CHRONIC RIGHT-SIDED THORACIC BACK PAIN: ICD-10-CM

## 2021-08-19 DIAGNOSIS — Z87.19 HISTORY OF GI BLEED: ICD-10-CM

## 2021-08-19 PROBLEM — I50.33 ACUTE ON CHRONIC DIASTOLIC (CONGESTIVE) HEART FAILURE (HCC): Status: RESOLVED | Noted: 2019-10-13 | Resolved: 2021-08-19

## 2021-08-19 PROBLEM — I16.0 HYPERTENSIVE URGENCY: Status: RESOLVED | Noted: 2019-10-09 | Resolved: 2021-08-19

## 2021-08-19 PROBLEM — K92.2 GI BLEED: Status: RESOLVED | Noted: 2021-04-29 | Resolved: 2021-08-19

## 2021-08-19 PROBLEM — K92.2 GASTROINTESTINAL HEMORRHAGE: Status: RESOLVED | Noted: 2020-02-24 | Resolved: 2021-08-19

## 2021-08-19 PROBLEM — I50.33 ACUTE ON CHRONIC DIASTOLIC HEART FAILURE (HCC): Status: RESOLVED | Noted: 2019-10-08 | Resolved: 2021-08-19

## 2021-08-19 PROBLEM — N30.00 ACUTE CYSTITIS WITHOUT HEMATURIA: Status: RESOLVED | Noted: 2020-10-06 | Resolved: 2021-08-19

## 2021-08-19 PROBLEM — J44.1 COPD EXACERBATION (HCC): Status: RESOLVED | Noted: 2020-06-29 | Resolved: 2021-08-19

## 2021-08-19 PROBLEM — J81.0 FLASH PULMONARY EDEMA (HCC): Status: RESOLVED | Noted: 2020-08-25 | Resolved: 2021-08-19

## 2021-08-19 PROBLEM — N18.4 CKD (CHRONIC KIDNEY DISEASE) STAGE 4, GFR 15-29 ML/MIN (HCC): Status: RESOLVED | Noted: 2020-10-06 | Resolved: 2021-08-19

## 2021-08-19 PROBLEM — N17.9 AKI (ACUTE KIDNEY INJURY) (HCC): Status: RESOLVED | Noted: 2019-10-23 | Resolved: 2021-08-19

## 2021-08-19 PROBLEM — I46.9 CARDIAC ARREST (HCC): Status: RESOLVED | Noted: 2021-03-18 | Resolved: 2021-08-19

## 2021-08-19 PROBLEM — G58.0 INTERCOSTAL NEUROPATHY: Status: RESOLVED | Noted: 2018-05-11 | Resolved: 2021-08-19

## 2021-08-19 PROBLEM — N18.30 STAGE 3 CHRONIC KIDNEY DISEASE (HCC): Status: ACTIVE | Noted: 2021-08-19

## 2021-08-19 PROBLEM — M47.26 OSTEOARTHRITIS OF SPINE WITH RADICULOPATHY, LUMBAR REGION: Status: RESOLVED | Noted: 2018-11-07 | Resolved: 2021-08-19

## 2021-08-19 PROBLEM — D64.9 SYMPTOMATIC ANEMIA: Status: RESOLVED | Noted: 2020-08-24 | Resolved: 2021-08-19

## 2021-08-19 PROCEDURE — 99349 HOME/RES VST EST MOD MDM 40: CPT | Performed by: NURSE PRACTITIONER

## 2021-08-19 PROCEDURE — 4040F PNEUMOC VAC/ADMIN/RCVD: CPT | Performed by: NURSE PRACTITIONER

## 2021-08-19 PROCEDURE — G8417 CALC BMI ABV UP PARAM F/U: HCPCS | Performed by: NURSE PRACTITIONER

## 2021-08-19 PROCEDURE — 1123F ACP DISCUSS/DSCN MKR DOCD: CPT | Performed by: NURSE PRACTITIONER

## 2021-08-19 PROCEDURE — 1090F PRES/ABSN URINE INCON ASSESS: CPT | Performed by: NURSE PRACTITIONER

## 2021-08-19 PROCEDURE — 1036F TOBACCO NON-USER: CPT | Performed by: NURSE PRACTITIONER

## 2021-08-19 RX ORDER — PANTOPRAZOLE SODIUM 40 MG/1
40 TABLET, DELAYED RELEASE ORAL
Qty: 90 TABLET | Refills: 3 | Status: SHIPPED | OUTPATIENT
Start: 2021-08-19 | End: 2022-08-12 | Stop reason: SDUPTHER

## 2021-08-19 ASSESSMENT — ENCOUNTER SYMPTOMS
WHEEZING: 0
TROUBLE SWALLOWING: 0
CONSTIPATION: 0
COUGH: 1
VOICE CHANGE: 0
NAUSEA: 0
ABDOMINAL DISTENTION: 0
DIARRHEA: 0
SHORTNESS OF BREATH: 1
BACK PAIN: 1
BLOOD IN STOOL: 0

## 2021-09-02 ENCOUNTER — OFFICE VISIT (OUTPATIENT)
Dept: FAMILY MEDICINE CLINIC | Age: 76
End: 2021-09-02
Payer: MEDICARE

## 2021-09-02 VITALS
DIASTOLIC BLOOD PRESSURE: 72 MMHG | HEART RATE: 80 BPM | WEIGHT: 156.2 LBS | OXYGEN SATURATION: 98 % | BODY MASS INDEX: 31.49 KG/M2 | HEIGHT: 59 IN | SYSTOLIC BLOOD PRESSURE: 126 MMHG | TEMPERATURE: 97.8 F

## 2021-09-02 DIAGNOSIS — R30.0 DYSURIA: Primary | ICD-10-CM

## 2021-09-02 DIAGNOSIS — Z00.00 ROUTINE GENERAL MEDICAL EXAMINATION AT A HEALTH CARE FACILITY: Primary | ICD-10-CM

## 2021-09-02 DIAGNOSIS — N89.8 VAGINAL DISCHARGE: ICD-10-CM

## 2021-09-02 DIAGNOSIS — D50.0 IRON DEFICIENCY ANEMIA DUE TO CHRONIC BLOOD LOSS: ICD-10-CM

## 2021-09-02 PROCEDURE — 4040F PNEUMOC VAC/ADMIN/RCVD: CPT | Performed by: FAMILY MEDICINE

## 2021-09-02 PROCEDURE — 99214 OFFICE O/P EST MOD 30 MIN: CPT | Performed by: FAMILY MEDICINE

## 2021-09-02 PROCEDURE — 1090F PRES/ABSN URINE INCON ASSESS: CPT | Performed by: FAMILY MEDICINE

## 2021-09-02 PROCEDURE — G8427 DOCREV CUR MEDS BY ELIG CLIN: HCPCS | Performed by: FAMILY MEDICINE

## 2021-09-02 PROCEDURE — G0439 PPPS, SUBSEQ VISIT: HCPCS | Performed by: FAMILY MEDICINE

## 2021-09-02 PROCEDURE — 1123F ACP DISCUSS/DSCN MKR DOCD: CPT | Performed by: FAMILY MEDICINE

## 2021-09-02 PROCEDURE — G8400 PT W/DXA NO RESULTS DOC: HCPCS | Performed by: FAMILY MEDICINE

## 2021-09-02 PROCEDURE — G8417 CALC BMI ABV UP PARAM F/U: HCPCS | Performed by: FAMILY MEDICINE

## 2021-09-02 PROCEDURE — 1036F TOBACCO NON-USER: CPT | Performed by: FAMILY MEDICINE

## 2021-09-02 RX ORDER — ZOSTER VACCINE RECOMBINANT, ADJUVANTED 50 MCG/0.5
0.5 KIT INTRAMUSCULAR SEE ADMIN INSTRUCTIONS
Qty: 0.5 ML | Refills: 0 | Status: CANCELLED | OUTPATIENT
Start: 2021-09-02 | End: 2021-09-03

## 2021-09-02 RX ORDER — LEVETIRACETAM 500 MG/1
500 TABLET ORAL 2 TIMES DAILY
Qty: 60 TABLET | Refills: 3 | Status: SHIPPED | OUTPATIENT
Start: 2021-09-02 | End: 2021-10-04 | Stop reason: ALTCHOICE

## 2021-09-02 RX ORDER — FLUCONAZOLE 150 MG/1
150 TABLET ORAL
Qty: 2 TABLET | Refills: 0 | Status: SHIPPED | OUTPATIENT
Start: 2021-09-02 | End: 2021-09-08

## 2021-09-02 ASSESSMENT — PATIENT HEALTH QUESTIONNAIRE - PHQ9
SUM OF ALL RESPONSES TO PHQ QUESTIONS 1-9: 2
SUM OF ALL RESPONSES TO PHQ9 QUESTIONS 1 & 2: 2
SUM OF ALL RESPONSES TO PHQ QUESTIONS 1-9: 2
1. LITTLE INTEREST OR PLEASURE IN DOING THINGS: 1
2. FEELING DOWN, DEPRESSED OR HOPELESS: 1
SUM OF ALL RESPONSES TO PHQ QUESTIONS 1-9: 2

## 2021-09-02 ASSESSMENT — LIFESTYLE VARIABLES: HOW OFTEN DO YOU HAVE A DRINK CONTAINING ALCOHOL: 0

## 2021-09-02 NOTE — PROGRESS NOTES
Chief Complaint   Patient presents with    Vaginal Pain     bleeding, buring and discomfort         HPI: Ravi Lyn 67 y.o. female presenting for     Vaginal bleeding and discomfort  Patient is coming with her daughter with a chief complaint of vaginal discomfort and bleeding. Denies any current bleeding. Denies any fevers, chills, nausea, vomiting or chest pain, shortness of breath, abdominal pain currently, change urination, change in stools. Anemia  Mix of iron deficiency anemia and history of GI bleeds. Please read the last hospital encounter    Ravi Lyn is a 67 y.o. female that was admitted and treated at [facility] for increasing lethargy and fatigue in the setting of chronic GI bleed. Patient has had multiple endoscopic evaluations in the past including a PillCam which showed some bleeding throughout the distal small intestine. She has been treated also has advancing CKD 3 which is likely contributing to her anemia at some point as well. She was recently admitted to [facility] with cardiac arrest and has been successfully discharged home. At this point in time she was transfused to a hemoglobin of 9.1 and she will be discharged with hematology follow-up. PICC line was placed as no one was able to place an IV in her while on admission this will be removed prior to discharge. She will follow up with hematology for hospital erythropoietin versus iron versus blood transfusions as needed. She also follows with nephrology due to her CKD. Of note the patient did have mild Trop elevation on arrival which is chronic and baseline and troponin's have been flat with no signs of acute coronary events. Patient was seen by hematology. Marybel was told that it was too soon for patient to have any iron transfusion/blood transfusions since she just had one in the hospital. Patient was to have blood work done in may and then repeated 2 weeks afterwards. Family is concerned because  Patient has a history where her blood count drops every month due to the chronic bleed in her GI system and she needs to be admitted to the hospital.family would like to see if there is a way where her blood count can be checked monthly or where she can be evaluated more frequently through other measures. Per , patient was seen by palliative care last week. From their understanding, they would resume all care and will not need any help from the specialist.  Family wants all of the care teams involved. Patient denies any fevers, chills, nausea, vomiting, chest pain, change in her shortness of breath, change in her stools. Follow-up  Patient is doing much better. Patient has her blood checked routinely. And has been established with hematology. Patient also follows up with gastroenterology. Per family, since she has been with palliative care she has been able to relate any concerns with them. Follow  Family reports that her blood count had dropped when she was at her most recent appointment. Denies any symptoms or concerns at this time. But patient is worried that if it drops any further that she will end up in the hospital.  Patient does not see palliative medicine until 1 month from now. Colonization of ESBL E. coli    Patient was recently treated for urinary tract infection by her primary care provider. At that time patient was given antibiotics and was told to have a repeat urinary culture for further evaluation. Patient also has chronic abdominal pain that was seen and had a CT scan in the hospital.  Patient is a follow-up with her hematologist in June. Follow-up  Was evaluated by infectious disease. Stated that patient does not need any antibiotics or anything at this time as she is likely been colonized with the bacteria. Currently patient has no issues or concerns.   Denies any fevers, chills, nausea, vomiting, chest pain, shortness of breath, abdominal pain, urination, changes in stools. Follow  Stable. Boil on abdomen. Patient was seen on the walk-in clinic. Was given antibiotics. Does feel like there is improving. Denies any fevers, chills, nausea, vomiting, chest pain, shortness of breath, abdominal pain, urination, change in stools. Follow  Stable. No issues at this time. Reports that it has not worsened. Stable.       Review of systems  Negative except for what is stated in HPI      Current Outpatient Medications   Medication Sig Dispense Refill    levETIRAcetam (KEPPRA) 500 MG tablet Take 1 tablet by mouth 2 times daily 60 tablet 3    fluconazole (DIFLUCAN) 150 MG tablet Take 1 tablet by mouth every 72 hours for 6 days 2 tablet 0    pantoprazole (PROTONIX) 40 MG tablet Take 1 tablet by mouth every morning (before breakfast) 90 tablet 3    cephALEXin (KEFLEX) 500 MG capsule Take 500 mg by mouth 2 times daily      Cranberry 500 MG TABS Take 500 mg by mouth Daily      Cholecalciferol (VITAMIN D3) 50 MCG (2000 UT) CAPS Take 2,000 Units by mouth Daily      ACCU-CHEK PHYLLIS PLUS strip Test 3x daily Dx E11.65 100 each 3    amLODIPine (NORVASC) 5 MG tablet Take 1 tablet by mouth daily 90 tablet 1    aspirin 81 MG EC tablet Take 1 tablet by mouth daily 30 tablet 3    PARoxetine (PAXIL) 40 MG tablet TAKE 1 TABLET BY MOUTH ONCE DAILY IN THE MORNING 90 tablet 1    lidocaine-prilocaine (EMLA) 2.5-2.5 % cream APPLY CREAM TOPICALLY TO PORT SITE ONE HOUR PRIOR TO APPOINTMENT AS NEEDED      Handicap Placard MISC by Does not apply route Good from 7/15/21 - 7/15/26 1 each 0    insulin glargine (LANTUS) 100 UNIT/ML injection vial 60 units at bedtime 3 vial 3    rosuvastatin (CRESTOR) 40 MG tablet TAKE 1 TABLET BY MOUTH ONCE DAILY IN THE EVENING 90 tablet 3    carvedilol (COREG) 25 MG tablet Take 1.5 tablets by mouth 2 times daily (with meals) 270 tablet 2    albuterol-ipratropium (COMBIVENT RESPIMAT)  MCG/ACT AERS inhaler Inhale 1 DO at [address], COLON, DIAGNOSTIC      EYE SURGERY      Phaco with IOL OU / [id number]    umbilical hernia repair    IR PORT PLACEMENT EQUAL OR GREATER THAN 5 YEARS  2021    IR PORT PLACEMENT EQUAL OR GREATER THAN 5 YEARS 2021 MLOZ SPECIAL PROCEDURE    IA ESOPHAGOGASTRODUODENOSCOPY TRANSORAL DIAGNOSTIC N/A 2017    EGD ESOPHAGOGASTRODUODENOSCOPY performed by Kirit Saxena MD at [facility] N/A 2018    negative findings    IA REVISE MEDIAN N/CARPAL TUNNEL SURG Left 2017    LEFT  CARPAL TUNNEL RELEASE [facility]us [facility]c [facility],3+T/L,YKC[facility] N/A 2018    TONSILLECTOMY      as child    TUNNELED VENOUS PORT PLACEMENT Right 2021    8 Fr [employer] Port ClearVUE by Dr. Jaiden Powell  2016    w/bx     UPPER GASTROINTESTINAL ENDOSCOPY N/A 2020    EGD possible biopsy performed by Alec Farnsworth MD at [address] N/A 2020    EGD PUSH ENTEROSCOPY [facility]        Family History   Problem Relation Age of Onset    Heart Disease Father         cardiac bypass    Arthritis Father     Arthritis Mother     Other Mother          at age 80    Other Sister         [facility]    No Known Problems Daughter     Stroke Son         Social History     Socioeconomic History    Marital status:      Spouse name: Lanye Roldan Number of children: 2    Years of education: 15    Highest education level: High school graduate   Occupational History    Occupation: Retired-   Tobacco Use    Smoking status: Former Smoker     Packs/day: 1.00     Years: 59.00     Pack years: 59.00     Types: Cigarettes     Start date: 2017     Quit date: 10/1/2020     Years since quittin.9    Smokeless tobacco: Never Used Vaping Use    Vaping Use: Never used   Substance and Sexual Activity    Alcohol use: Not Currently    Drug use: No    Sexual activity: Yes     Partners: Male   Other Topics Concern    Not on file   Social History Narrative    Grew up in [place]     Lives With:  [id number] Spouse    Type of Home: House    Home Layout: Two level, Performs ADL's on one level    Home Access: Stairs to enter with rails    Entrance Stairs - Number of Steps: 4    Bathroom Shower/Tub: Tub/Shower unit, Doors    Bathroom Equipment: Shower chair, Grab bars in [facility]: Rolling walker, Cane, Oxygen(uses her O2 very seldom)    ADL Assistance: Needs assistance    Homemaking Assistance: Needs assistance    Homemaking Responsibilities: No(spouse performs)    Ambulation Assistance: Independent    Transfer Assistance: Independent    Active : No    Occupation: Retired    Type of occupation: worked in [employer]: patient enjoys [address] [facility] [employer] Strain: Low Risk     Difficulty of Paying Living Expenses: Not hard at [employer] Insecurity: No Food Insecurity    Worried About [address] in the Last Year: Never true    [facility] in the Last Year: Never true   Transportation Needs: No Transportation Needs    Lack of Transportation (Medical): No    Lack of Transportation (Non-Medical): No   Physical Activity: Inactive    Days of Exercise per Week: 0 days    Minutes of Exercise per Session: 0 min   Stress: No Stress Concern Present    Feeling of Stress : Only a little   Social Connections:  Moderately Integrated    Frequency of Communication with Friends and Family: More than three times a week    Frequency of Social Gatherings with Friends and Family: More than three times a week    Attends Quaker Services: 1 to 4 times per year    Active Member of [employer] Group or Organizations: No    Attends Club or Organization Meetings: Never    Marital Status: Living with partner   Intimate Partner Violence: Not At Risk    Fear of Current or Ex-Partner: No    Emotionally Abused: No    Physically Abused: No    Sexually Abused: No        /72 (Site: Right Upper Arm, Position: Sitting, Cuff Size: Medium Adult)   Pulse 80   Temp 97.8 °F (36.6 °C) (Temporal)   Ht 4' 11\" (1.499 m)   Wt 156 lb 3.2 oz (70.9 kg)   LMP  (LMP Unknown)   SpO2 98%   Breastfeeding No   BMI 31.55 kg/m²        Physical Exam:    Physical Exam:  General Appearance: alert and oriented to person, place and time, well developed and well- nourished, in no acute distress, in wheelchair  Skin: warm and dry, no rash or erythema  Head: normocephalic and atraumatic  Eyes: pupils equal, round, and reactive to light, extraocular eye movements intact, conjunctivae normal  ENT: tympanic membrane, external ear and ear canal normal bilaterally, nose without deformity, nasal mucosa and turbinates normal without polyps  Neck: supple and non-tender without mass, no thyromegaly or thyroid nodules, no cervical lymphadenopathy  Pulmonary/Chest: Distant heard. Cardiovascular: normal rate, regular rhythm, normal S1 and S2, no murmurs, rubs, clicks, or gallops, distal pulses intact, no carotid bruits  Abdomen: Tenderness palpation on the right side of the abdomen. Admits to a boil in the abdomen that did have some discharge and erythema. Feels that it is improving. Extremities: no cyanosis, clubbing or edema  Musculoskeletal: normal range of motion, no joint swelling, deformity or tenderness  Neurologic: reflexes normal and symmetric, no cranial nerve deficit, gait, coordination and speech normal  Pelvic examination: scant discharge noted. No bleeding noted.    Labs   TSH   Date Value Ref Range Status   08/27/2020 0.777 0.440 - 3.860 uIU/mL Final     TSH   Date Value Ref Range Status   11/30/2020 0.474 0.440 - 3.860 uIU/mL Final   10/05/2020 1.070 0.440 - 3.860 uIU/mL Final   10/13/2019 1.200 0.440 - 3.860 uIU/mL Final   05/03/2014 0.421 0.270 - 4.200 uIU/mL Final     Lab Results   Component Value Date     06/10/2021    K 5.1 (H) 06/10/2021     06/10/2021    CO2 21 06/10/2021    BUN 36 (H) 06/10/2021    CREATININE 2.17 (H) 06/10/2021    GLUCOSE 218 (H) 06/10/2021    CALCIUM 10.1 (H) 06/10/2021    PROT 6.2 (L) 04/29/2021    LABALBU 4.2 06/10/2021    BILITOT <0.2 04/29/2021    ALKPHOS 118 04/29/2021    AST 20 04/29/2021    ALT 12 04/29/2021    LABGLOM 22.0 (L) 06/10/2021    GFRAA 26.6 (L) 06/10/2021    GLOB 2.7 04/29/2021     Lab Results   Component Value Date    WBC 4.4 (L) 06/24/2021    HGB 12.2 06/24/2021    HCT 38.0 06/24/2021    MCV 85.1 06/24/2021     06/24/2021     Lab Results   Component Value Date    LABA1C 7.5 (H) 04/29/2021     No results found for: EAG      A/P: Sade Nick 67 y.o. female presenting for     1. Dysuria  Rule out any infection. Patient does have a history of EBSL which was evaluated by ID and did not require any treatment. Will give patient diflucan to rule out any yeast infection   - Urine Reflex to Culture; Future    2. Vaginal discharge    - fluconazole (DIFLUCAN) 150 MG tablet; Take 1 tablet by mouth every 72 hours for 6 days  Dispense: 2 tablet; Refill: 0  - Culture, Genital; Future    3. Iron deficiency anemia due to chronic blood loss  Unable to see the results from oncology. Advised patient if she is feeling symptomatic can draw her blood then. Otherwise since patient is stable will monitor. Please note, this report has been partially produced using speech recognition software  and may cause  and /or contain errors related to that system including grammar, punctuation and spelling as well as words and phrases that may seem inappropriate. If there are questions or concerns please feel free to contact me to clarify.

## 2021-09-08 NOTE — PROGRESS NOTES
7/15/26  Nicky Chavez MD   insulin glargine (LANTUS) 100 UNIT/ML injection vial 60 units at bedtime  Lyle Scott MD   rosuvastatin (CRESTOR) 40 MG tablet TAKE 1 TABLET BY MOUTH ONCE DAILY IN THE EVENING  Ryan Guerrero MD   carvedilol (COREG) 25 MG tablet Take 1.5 tablets by mouth 2 times daily (with meals)  Edwige Arenas MD   albuterol-ipratropium (COMBIVENT RESPIMAT)  MCG/ACT AERS inhaler Inhale 1 puff into the lungs every 6 hours as needed for Wheezing or Shortness of Breath  Lushannon Webb, APRN - N[place]   ipratropium-albuterol (DUONEB) 0.5-2.5 (3) MG/3ML SOLN nebulizer solution Inhale 3 mLs into the lungs 3 times daily  Eli Doshi MD   insulin lispro (HUMALOG) 100 UNIT/ML injection vial 18 units at each Stephany Lawrence MD   Accu-Chek Softclix Lancets MISC bid  Craig Abraham MD   Blood Glucose Monitoring Suppl (ACCU-CHEK PHYLLIS CONNECT) w/Device KIT 1 kit by Does not apply route daily  Craig Abraham MD   Continuous Blood Gluc Sensor (FREESTYLE MURALI 14 DAY SENSOR) MISC Use every 2 weeks  Craig Abraham MD   Continuous Blood Gluc  (FREESTYLE MURALI 14 DAY READER) Moira Fruits As directed  Craig Abraham MD         Past Medical History:   Diagnosis Date    Anxiety     Asthma     dx 2019 / has smoked since age 11    CHF (congestive heart failure) (HCC)     Chronic back pain     Bilateral L5 S1 Radic on emg--surprisingly worse on the left than the right--pt's symptoms and her MRI show worse on the right    Chronic obstructive pulmonary disease with acute exacerbation ([address] 10/12/2019    Depression     ESBL E. coli carrier     Carrier.     Fibromyalgia     Gastrointestinal hemorrhage 2/24/2020    Hyperlipidemia     meds > 8 yrs    Hypertension     meds > 45 yrs    On home O2     2l per n/c at bedtime mostly,     Osteoarthritis     Smoker 6/18/2013    Type II diabetes mellitus, uncontrolled ([address]     hx > 8 yrs    Unspecified sleep apnea        Past Surgical History:   Procedure Laterality Date    BACK SURGERY  2017    lumbar disc    CARDIAC CATHETERIZATION  2014    DR. MIRELES / no stents    COLONOSCOPY  2016    w/polypectomy     COLONOSCOPY N/A 2020    COLONOSCOPY WITH POLYPECTOMY performed by Argelia Cutler MD at [facility] N/A 10/07/2019    EUA HYSTEROSCOPY DILATATION AND CURETTAGE performed by Elena Newton DO at [facility]. [address], COLON, DIAGNOSTIC      EYE SURGERY      Phaco with IOL OU / [address]    umbilical hernia repair    IR PORT PLACEMENT EQUAL OR GREATER THAN 5 YEARS  2021    IR PORT PLACEMENT EQUAL OR GREATER THAN 5 YEARS 2021 MLOZ SPECIAL PROCEDURE    MD ESOPHAGOGASTRODUODENOSCOPY TRANSORAL DIAGNOSTIC N/A 2017    EGD ESOPHAGOGASTRODUODENOSCOPY performed [facility] Tucker Robertson MD at [facility] N/A 2018    negative findings    MD REVISE MEDIAN N/CARPAL TUNNEL SURG Left 2017    LEFT  CARPAL TUNNEL RELEASE performed by Delio Turner MD [facility]E/K[facility] N/A 2018    TONSILLECTOMY      as child    TUNNELED VENOUS PORT PLACEMENT Right 2021    8 Fr [employer] Port ClearVUE by Dr. Urszula San  2016    w/bx     UPPER GASTROINTESTINAL ENDOSCOPY N/A 2020    EGD possible biopsy performed by Argelia Cutler MD at [address] N/A 2020    EGD PUSH ENTEROSCOPY performed by Immanuel Flor MD at [facility]         Family History   Problem Relation Age of Onset    Heart Disease Father         cardiac bypass    Arthritis Father     Arthritis Mother     Other Mother          at age 80    Other Sister         [facility]    No Known Problems Daughter     Stroke Son        Frank (Including outside providers/suppliers regularly involved in providing care): Patient Care Team:  Geetha Rivero MD as PCP - General (Family Medicine)  Geetha Rivero MD as PCP - [facility] Empaneled Provider  Jacey Davila MD as Cardiologist (Cardiology)  JOHNATHON Cheng CNP as Nurse Practitioner (Palliative Medicine)    Wt Readings from Last 3 Encounters:   09/02/21 156 lb 3.2 oz (70.9 kg)   08/06/21 152 lb (68.9 kg)   05/25/21 141 lb (64 kg)     There were no vitals filed for this visit. There is no height or weight on file to calculate BMI. Based upon direct observation of the patient, evaluation of cognition reveals global memory impairment noted. Patient's complete Health Risk Assessment and screening values have been reviewed and are found in Flowsheets. The following problems were reviewed today and where indicated follow up appointments were made and/or referrals ordered.     Positive Risk Factor Screenings with Interventions:      Cognitive:     Cognitive Impairment Interventions:  · Patient declines any further evaluation/treatment for cognitive impairment         General Health and ACP:     Advance Directives     Power of  Living Will ACP-Advance Directive ACP-Power of     Not on File Not on File Not on File Not on File      General Health Risk Interventions:  · Fatigue: at baseline    Health Habits/Nutrition:        Health Habits/Nutrition Interventions:  · Inadequate physical activity:  educational materials provided to promote increased physical activity      ADL:     ADL Interventions:  · Patient declines any further evaluation/treatment for this issue    Personalized Preventive Plan   Current Health Maintenance Status  Immunization History   Administered Date(s) Administered    COVID-19, Moderna, PF, 100mcg/0.5mL 03/06/2021, 08/17/2021    Influenza Vaccine, unspecified formulation 09/11/2015    Influenza, High Dose (Fluzone 65 yrs and older) 10/12/2016, 10/23/2017    Influenza, High-dose, Quadv, 65 yrs +, IM (Fluzone) 09/17/2020

## 2021-09-08 NOTE — PATIENT INSTRUCTIONS
Personalized Preventive Plan for Weller See - 9/2/2021  Medicare offers a range of preventive health benefits. Some of the tests and screenings are paid in full while other may be subject to a deductible, co-insurance, and/or copay. Some of these benefits include a comprehensive review of your medical history including lifestyle, illnesses that may run in your family, and various assessments and screenings as appropriate. After reviewing your medical record and screening and assessments performed today your provider may have ordered immunizations, labs, imaging, and/or referrals for you. A list of these orders (if applicable) as well as your Preventive Care list are included within your After Visit Summary for your review. Other Preventive Recommendations:    · A preventive eye exam performed by an eye specialist is recommended every 1-2 years to screen for glaucoma; cataracts, macular degeneration, and other eye disorders. · A preventive dental visit is recommended every 6 months. · Try to get at least 150 minutes of exercise per week or 10,000 steps per day on a pedometer . · Order or download the FREE \"Exercise & Physical Activity: Your Everyday Guide\" from [employer]. Call 4-263.152.3963 or search [employer] online. · You need 4023-6205 mg of calcium and 5019-2174 IU of vitamin D per day. It is possible to meet your calcium requirement with diet alone, but a vitamin D supplement is usually necessary to meet this goal.  · When exposed to the sun, use a sunscreen that protects against both UVA and UVB radiation with an SPF of 30 or greater. Reapply every 2 to 3 hours or after sweating, drying off with a towel, or swimming. · Always wear a seat belt when traveling in a car. Always wear a helmet when riding a bicycle or motorcycle.

## 2021-09-10 ENCOUNTER — TELEPHONE (OUTPATIENT)
Dept: PALLATIVE CARE | Age: 76
End: 2021-09-10

## 2021-09-10 NOTE — TELEPHONE ENCOUNTER
Last Hgb. Count 10.7. Patient and family have been discussing how often patient should have her blood drawn. Her counts have gone down a little but seem to be stable. At this point, patient wants to enjoy her life and not worry about going to get her blood drawn every couple of weeks. If patient/family notice any concerning signs of a low hbg. (increasing SOB, dizziness, blood in the stool, black tarry stools, coffee ground emesis, or other bleeding), please call.

## 2021-09-10 NOTE — TELEPHONE ENCOUNTER
Patient's  requesting you to call him. He would like to give you an update regarding her bloodwork and discuss some things.

## 2021-09-13 DIAGNOSIS — R30.0 DYSURIA: Primary | ICD-10-CM

## 2021-09-13 DIAGNOSIS — F22 DELUSION OF FOUL ODOR (HCC): ICD-10-CM

## 2021-09-13 RX ORDER — CIPROFLOXACIN 500 MG/1
500 TABLET, FILM COATED ORAL DAILY
Qty: 7 TABLET | Refills: 0 | Status: SHIPPED | OUTPATIENT
Start: 2021-09-13 | End: 2021-09-20

## 2021-09-15 DIAGNOSIS — Z79.4 TYPE 2 DIABETES MELLITUS WITH OTHER SPECIFIED COMPLICATION, WITH LONG-TERM CURRENT USE OF INSULIN (HCC): ICD-10-CM

## 2021-09-15 DIAGNOSIS — E11.69 TYPE 2 DIABETES MELLITUS WITH OTHER SPECIFIED COMPLICATION, WITH LONG-TERM CURRENT USE OF INSULIN (HCC): ICD-10-CM

## 2021-09-15 NOTE — TELEPHONE ENCOUNTER
patient requesting medication refill.  Please approve or deny this request.    Rx requested:  Requested Prescriptions     Pending Prescriptions Disp Refills    insulin glargine (LANTUS) 100 UNIT/ML injection vial       Si units at bedtime         Last Office Visit:   3/17/2021      Next Visit Date:  Future Appointments   Date Time Provider Zaid Lr   2021  2:45 PM Leti Rizo MD [facility]   10/4/2021  1:00 PM JOHNATHON Spencer - CNP [facility]   2021  2:15 PM Ramez Bhagat  [facility]   2021 10:30 AM Leti Rizo  [place], [place]

## 2021-09-16 RX ORDER — INSULIN GLARGINE 100 [IU]/ML
INJECTION, SOLUTION SUBCUTANEOUS
Qty: 3 EACH | Refills: 3 | Status: SHIPPED | OUTPATIENT
Start: 2021-09-16 | End: 2021-12-09 | Stop reason: SDUPTHER

## 2021-09-29 ENCOUNTER — TELEPHONE (OUTPATIENT)
Dept: PALLATIVE CARE | Age: 76
End: 2021-09-29

## 2021-09-29 DIAGNOSIS — Z87.19 HISTORY OF GI BLEED: Primary | ICD-10-CM

## 2021-09-29 DIAGNOSIS — G40.909 SEIZURE DISORDER (HCC): ICD-10-CM

## 2021-09-29 NOTE — TELEPHONE ENCOUNTER
Spouse reports increase in patient's seizures. Patient looks at spouse, rolls eyes, and sticks tongue out. She is unable to talk and becomes lethargic. She does not remember it when it happens. Patient is having 3-4 day now. Patient does not want to go to the ER but my recommendation is that she goes. Explained that untreated seizures can cause death. Will put in neurology referral. Family would like a CBC to check blood counts per hem/onc's recommendation. She is having some fatigue. Last count was 10.7 per family from cancer center. Patient has a history of GI bleeding. No active signs of bleeding. Discussed that if this count is stable, I do not feel it is necessary to keep having her blood drawn.

## 2021-10-01 ENCOUNTER — TELEPHONE (OUTPATIENT)
Dept: MEDSURG UNIT | Age: 76
End: 2021-10-01

## 2021-10-04 ENCOUNTER — OFFICE VISIT (OUTPATIENT)
Dept: PALLATIVE CARE | Age: 76
End: 2021-10-04
Payer: MEDICARE

## 2021-10-04 VITALS
HEART RATE: 84 BPM | SYSTOLIC BLOOD PRESSURE: 146 MMHG | OXYGEN SATURATION: 98 % | DIASTOLIC BLOOD PRESSURE: 69 MMHG | TEMPERATURE: 99.1 F

## 2021-10-04 DIAGNOSIS — R56.9 SEIZURES (HCC): ICD-10-CM

## 2021-10-04 DIAGNOSIS — J44.9 CHRONIC OBSTRUCTIVE PULMONARY DISEASE, UNSPECIFIED COPD TYPE (HCC): Primary | ICD-10-CM

## 2021-10-04 DIAGNOSIS — Z51.5 PALLIATIVE CARE ENCOUNTER: ICD-10-CM

## 2021-10-04 DIAGNOSIS — I50.41 ACUTE COMBINED SYSTOLIC AND DIASTOLIC CHF, NYHA CLASS 1 (HCC): ICD-10-CM

## 2021-10-04 PROCEDURE — 1123F ACP DISCUSS/DSCN MKR DOCD: CPT | Performed by: NURSE PRACTITIONER

## 2021-10-04 PROCEDURE — 1090F PRES/ABSN URINE INCON ASSESS: CPT | Performed by: NURSE PRACTITIONER

## 2021-10-04 PROCEDURE — 4040F PNEUMOC VAC/ADMIN/RCVD: CPT | Performed by: NURSE PRACTITIONER

## 2021-10-04 PROCEDURE — 1036F TOBACCO NON-USER: CPT | Performed by: NURSE PRACTITIONER

## 2021-10-04 PROCEDURE — G8484 FLU IMMUNIZE NO ADMIN: HCPCS | Performed by: NURSE PRACTITIONER

## 2021-10-04 PROCEDURE — G8417 CALC BMI ABV UP PARAM F/U: HCPCS | Performed by: NURSE PRACTITIONER

## 2021-10-04 PROCEDURE — 99349 HOME/RES VST EST MOD MDM 40: CPT | Performed by: NURSE PRACTITIONER

## 2021-10-04 RX ORDER — PYRIDOXINE HCL (VITAMIN B6) 100 MG
500 TABLET ORAL DAILY
COMMUNITY

## 2021-10-04 ASSESSMENT — ENCOUNTER SYMPTOMS
COUGH: 1
DIARRHEA: 0
BLOOD IN STOOL: 0
TROUBLE SWALLOWING: 0
ABDOMINAL DISTENTION: 0
BACK PAIN: 0
NAUSEA: 0
CONSTIPATION: 0
WHEEZING: 0
SHORTNESS OF BREATH: 1
VOICE CHANGE: 0

## 2021-10-04 NOTE — PROGRESS NOTES
Subjective:      Patient Id: Analia Chatman was seen at  home in [facility], for palliative medicine follow-up. She was accompanied to the appointment by: spouse, Yomi Chavez. Chief Complaint   Patient presents with    Seizures      HPI       Jagruti Townsend is a 67 y.o. female with a complex medical history that includes GI bleeding throughout the distal small intestine, anxiety, asthma, CHF, COPD, CKD depression, fibromyalgia, OA, cardiac arrest, seizures, and sleep apnea. Home visit is necessary in lieu of office due to significant frailty and high symptom burden from comorbid illnesses. Patient is drowsy but is easily aroused. Spouse reports that patient just had a seizure. Seizures present as babbling, sticking tongue out, inability to communicate, and unusual movements. After the seizure, she is very tired and unaware of what happened. She reports that she is very tired now. Patient is slurring some of her words and making strange hand gestures. They spoke with primary care provider as well regarding the seizures who recommended neuro F/U. Reports the seizures have been going on for 20 years. Family thinks patient used to see neurology a long time ago, but not recently. The seizures had stopped for a while, but now are occurring up to 4 times a day. They have not made appointment with neurology yet. Weight is stable. No leg swelling or increased SOB. She does have SOB with exertion, but this is her baseline. Reports chronic cough but no sputum. No fevers, chills, or myalgias. Patient's back pain has been better lately. Currently patient is not having any pain.         Past Medical History:   Diagnosis Date    Anxiety     Asthma     dx 2019 / has smoked since age 14    CHF (congestive heart failure) (HCC)     Chronic back pain     Bilateral L5 S1 Radic on emg--surprisingly worse on the left than the right--pt's symptoms and her MRI show worse on the right    Chronic obstructive pulmonary disease with acute exacerbation ([facility] 10/12/2019    Depression     ESBL E. coli carrier     Carrier.  Fibromyalgia     Gastrointestinal hemorrhage 2/24/2020    Hyperlipidemia     meds > 8 yrs    Hypertension     meds > 45 yrs    On home O2     2l per n/c at bedtime mostly,     Osteoarthritis     Seizures ([facility] [facility]     Smoker 6/18/2013    Type II diabetes mellitus, uncontrolled ([facility] [address] [facility]     hx > 8 yrs    Unspecified sleep apnea      Past Surgical History:   Procedure Laterality Date    BACK SURGERY  2017    lumbar disc    CARDIAC CATHETERIZATION  11/03/2014    DR. MIRELES / no stents    COLONOSCOPY  08/29/2016    w/polypectomy     COLONOSCOPY N/A 09/29/2020    COLONOSCOPY WITH POLYPECTOMY performed by Flex Vines MD at [facility] N/A 10/07/2019    EUA HYSTEROSCOPY DILATATION AND CURETTAGE performed by Karla Bueno DO at [facility]. [address], COLON, DIAGNOSTIC      EYE SURGERY      [place] with IOL OU / [id number]    umbilical hernia repair    IR PORT PLACEMENT EQUAL OR GREATER THAN 5 YEARS  5/14/2021    IR PORT PLACEMENT EQUAL OR GREATER THAN 5 YEARS 5/14/2021 [facility] SPECIAL PROCEDURE    MD ESOPHAGOGASTRODUODENOSCOPY TRANSORAL DIAGNOSTIC N/A 03/24/2017    EGD ESOPHAGOGASTRODUODENOSCOPY performed by Yasmin Salvador MD at [place] [id number] 02/08/2018    negative findings    MD REVISE MEDIAN N/CARPAL TUNNEL SURG Left 06/05/2017    LEFT  CARPAL TUNNEL RELEASE performed by Lonna Boas, MD at [facility] PRKU,4+K/Y,TYVXG N/A 02/08/2018    TONSILLECTOMY      as child    TUNNELED VENOUS PORT PLACEMENT Right 05/14/2021    [employer] [address] [employer] Port ClearVUE by Dr. Rosy Gibson  08/26/2016    w/bx     UPPER GASTROINTESTINAL ENDOSCOPY N/A 02/25/2020    EGD possible biopsy performed by Flex Vines MD at [address] [place] [address] of Transportation (Non-Medical): No   Physical Activity: Inactive    Days of Exercise per Week: 0 days    Minutes of Exercise per Session: 0 min   Stress: No Stress Concern Present    Feeling of Stress : Only a little   Social Connections:  Moderately Integrated    Frequency of Communication with Friends and Family: More than three times a week    Frequency of Social Gatherings with Friends and Family: More than three times a week    Attends Rastafari Services: 1 to 4 times per year    Active Member of [address] [place] [address] [employer] or Organizations: No    Attends Club or Organization Meetings: Never    Marital Status: Living with partner   Intimate Partner Violence: Not At Risk    Fear of Current or Ex-Partner: No    Emotionally Abused: No    Physically Abused: No    Sexually Abused: No     Family History   Problem Relation Age of Onset    Heart Disease Father         cardiac bypass    Arthritis Father     Arthritis Mother     Other Mother          at age 80    Other Sister         [facility]    No Known Problems Daughter     Stroke Son      Allergies   Allergen Reactions    Ibuprofen Nausea Only    Metformin And Related      Diarrhea      Darvon [Propoxyphene Hcl] Nausea And Vomiting     Current Outpatient Medications on File Prior to Visit   Medication Sig Dispense Refill    Cranberry 500 MG CAPS Take 500 mg by mouth daily      insulin glargine (LANTUS) 100 UNIT/ML injection vial 60 units at bedtime 3 each 3    levETIRAcetam (KEPPRA) 500 MG tablet Take 1 tablet by mouth 2 times daily 60 tablet 3    pantoprazole (PROTONIX) 40 MG tablet Take 1 tablet by mouth every morning (before breakfast) 90 tablet 3    cephALEXin (KEFLEX) 500 MG capsule Take 500 mg by mouth 2 times daily      Cranberry 500 MG TABS Take 500 mg by mouth Daily      Cholecalciferol (VITAMIN D3) 50 MCG (2000 UT) CAPS Take 2,000 Units by mouth Daily      ACCU-CHEK PHYLLIS PLUS strip Test 3x daily Dx E11.65 100 each 3    amLODIPine (NORVASC) 5 MG tablet Take 1 tablet by mouth daily 90 tablet 1    aspirin 81 MG EC tablet Take 1 tablet by mouth daily 30 tablet 3    PARoxetine (PAXIL) 40 MG tablet TAKE 1 TABLET BY MOUTH ONCE DAILY IN THE MORNING 90 tablet 1    lidocaine-prilocaine (EMLA) 2.5-2.5 % cream APPLY CREAM TOPICALLY TO PORT SITE ONE HOUR PRIOR TO APPOINTMENT AS NEEDED      Handicap Placard MISC by Does not apply route Good from 7/15/21 - 7/15/26 1 each 0    rosuvastatin (CRESTOR) 40 MG tablet TAKE 1 TABLET BY MOUTH ONCE DAILY IN THE EVENING 90 tablet 3    carvedilol (COREG) 25 MG tablet Take 1.5 tablets by mouth 2 times daily (with meals) 270 tablet 2    albuterol-ipratropium (COMBIVENT RESPIMAT)  MCG/ACT AERS inhaler Inhale 1 puff into the lungs every 6 hours as needed for Wheezing or Shortness of Breath 4 g 3    ipratropium-albuterol (DUONEB) 0.5-2.5 (3) MG/3ML SOLN nebulizer solution Inhale 3 mLs into the lungs 3 times daily 360 mL 0    insulin lispro (HUMALOG) 100 UNIT/ML injection vial 18 units at each mels 1 vial 3    Accu-Chek Softclix Lancets MISC bid 100 each 3    Blood Glucose Monitoring Suppl (ACCU-CHEK PHYLLIS CONNECT) w/Device KIT 1 kit by Does not apply route daily 1 kit 0    Continuous Blood Gluc Sensor (FREESTYLE MURALI 14 DAY SENSOR) [facility] Use every 2 weeks 2 each 1    Continuous Blood Gluc  (FREESTYLE MURALI 14 DAY READER) [place] As directed 1 Device 0     No current facility-administered medications on file prior to visit. Review of Systems   Constitutional: Positive for fatigue. Negative for activity change, appetite change, fever and unexpected weight change. HENT: Negative for trouble swallowing and voice change. Respiratory: Positive for cough and shortness of breath (with exertion). Negative for wheezing. Cardiovascular: Negative for chest pain and leg swelling. Gastrointestinal: Negative for abdominal distention, blood in stool, constipation, diarrhea and nausea. Genitourinary: Negative. Musculoskeletal: Positive for gait problem. Negative for back pain. Skin: Negative. Neurological: Positive for seizures, speech difficulty and weakness. Negative for dizziness. Psychiatric/Behavioral: Negative for confusion, dysphoric mood and sleep disturbance. The patient is not nervous/anxious. Objective:   BP (!) 146/69   Pulse 84   Temp 99.1 °F (37.3 °C)   LMP  (LMP Unknown)   SpO2 98%    Wt Readings from Last 3 Encounters:   09/02/21 156 lb 3.2 oz (70.9 kg)   08/06/21 152 lb (68.9 kg)   05/25/21 141 lb (64 kg)     Physical Exam  Constitutional:       General: She is not in acute distress. HENT:      Head: Normocephalic and atraumatic. Eyes:      General: No scleral icterus. Right eye: No discharge. Left eye: No discharge. Extraocular Movements: Extraocular movements intact. Conjunctiva/sclera: Conjunctivae normal.   Cardiovascular:      Rate and Rhythm: Normal rate and regular rhythm. Heart sounds: Normal heart sounds. Pulmonary:      Effort: Pulmonary effort is normal.      Breath sounds: Normal breath sounds. No wheezing. Abdominal:      General: Bowel sounds are normal. There is no distension. Palpations: Abdomen is soft. Tenderness: There is no abdominal tenderness. Musculoskeletal:      Cervical back: Normal range of motion and neck supple. Right lower leg: No edema. Left lower leg: No edema. Skin:     General: Skin is warm and dry. Neurological:      Mental Status: She is alert and oriented to person, place, and time. Mental status is at baseline. Comments: Slurred speech, unusual movements of her hands/arms. Psychiatric:         Mood and Affect: Mood normal.         Behavior: Behavior normal.         Assessment and Plan:      1. Chronic obstructive pulmonary disease, unspecified COPD type ([facility] [address] [facility]  Stable. Continue COPD directed therapies.  Call for increased SOB, cough, sputum production, excessive fatigue, fever, chills, or myalgias. Activity as tolerated. 2. Acute combined systolic and diastolic CHF  Stable. Monitor weight daily, call if you gain 3 lbs in one day or 5 lbs. in one week or for increased edema, sob, cough, orthopnea, or chest pain. Continue to follow with cardiology. 3. Seizures  Again, I recommended that patient go to the ER. Seizures have increased in frequency. I also cannot rule out a stroke/TIA. Patient's behavior is very concerning. Explained to patient the seriousness of the issue. I do not know what is causing patient's symptoms and they should be further evaluated. I was not aware patient was still having seizures until a couple days ago when her spouse called. Please make an appointment with neurology ASAP as patient does not follow with them. Patient's wishes are to not go to the ER. 4. Palliative care encounter  Discussed symptom management related to chronic disease/condition. Provided emotional support and active listening. Patient understands and is agreeable to current plan. Due to acuity, symptomatology and high-risk medication management, I advised patient to Return in about 1 month (around 11/4/2021). Total Time 40 minutes     Total time includes reviewing reviewing history, medications, and allergies, counseling patient, performing medically appropriate evaluation and examination, and documenting in the medical record.        JOHNATHON Vargas CNP    Collaborating physician: Dr. Nas Genao

## 2021-10-12 ENCOUNTER — TELEPHONE (OUTPATIENT)
Dept: PALLATIVE CARE | Age: 76
End: 2021-10-12

## 2021-10-12 DIAGNOSIS — R82.90 FOUL SMELLING URINE: ICD-10-CM

## 2021-10-12 DIAGNOSIS — R53.83 FATIGUE, UNSPECIFIED TYPE: Primary | ICD-10-CM

## 2021-10-12 NOTE — TELEPHONE ENCOUNTER
Pt's  called and states that he feels that Adrián Banegas has a UTI and would like to speak to Marcia Reed about getting an order for a UA

## 2021-10-12 NOTE — TELEPHONE ENCOUNTER
Patient has been having foul smelling urine. Patient is not drinking enough water. Reports normal colored urine. No blood in urine. No increased confusion but she is very tired and sleeping. No fevers. No new back pain. We discussed that we typically do not determine UTIs based off urine smell, but I will order a UA as patient is also having increased fatigue. Encourage fluids.

## 2021-10-13 ENCOUNTER — OFFICE VISIT (OUTPATIENT)
Dept: NEUROLOGY | Age: 76
End: 2021-10-13
Payer: MEDICARE

## 2021-10-13 ENCOUNTER — TELEPHONE (OUTPATIENT)
Dept: NEUROLOGY | Age: 76
End: 2021-10-13

## 2021-10-13 VITALS — DIASTOLIC BLOOD PRESSURE: 68 MMHG | SYSTOLIC BLOOD PRESSURE: 116 MMHG | HEART RATE: 78 BPM

## 2021-10-13 DIAGNOSIS — R31.9 URINARY TRACT INFECTION WITH HEMATURIA, SITE UNSPECIFIED: Primary | ICD-10-CM

## 2021-10-13 DIAGNOSIS — J44.9 CHRONIC OBSTRUCTIVE PULMONARY DISEASE, UNSPECIFIED COPD TYPE (HCC): ICD-10-CM

## 2021-10-13 DIAGNOSIS — Z09 HOSPITAL DISCHARGE FOLLOW-UP: ICD-10-CM

## 2021-10-13 DIAGNOSIS — F41.9 ANXIETY: ICD-10-CM

## 2021-10-13 DIAGNOSIS — N39.0 URINARY TRACT INFECTION WITH HEMATURIA, SITE UNSPECIFIED: Primary | ICD-10-CM

## 2021-10-13 DIAGNOSIS — F40.240 CLAUSTROPHOBIA: ICD-10-CM

## 2021-10-13 DIAGNOSIS — R56.9 SEIZURE (HCC): Primary | ICD-10-CM

## 2021-10-13 PROCEDURE — 1123F ACP DISCUSS/DSCN MKR DOCD: CPT | Performed by: NURSE PRACTITIONER

## 2021-10-13 PROCEDURE — 99214 OFFICE O/P EST MOD 30 MIN: CPT | Performed by: NURSE PRACTITIONER

## 2021-10-13 PROCEDURE — 1036F TOBACCO NON-USER: CPT | Performed by: NURSE PRACTITIONER

## 2021-10-13 PROCEDURE — G8484 FLU IMMUNIZE NO ADMIN: HCPCS | Performed by: NURSE PRACTITIONER

## 2021-10-13 PROCEDURE — 4040F PNEUMOC VAC/ADMIN/RCVD: CPT | Performed by: NURSE PRACTITIONER

## 2021-10-13 PROCEDURE — G8427 DOCREV CUR MEDS BY ELIG CLIN: HCPCS | Performed by: NURSE PRACTITIONER

## 2021-10-13 PROCEDURE — 1090F PRES/ABSN URINE INCON ASSESS: CPT | Performed by: NURSE PRACTITIONER

## 2021-10-13 PROCEDURE — G8417 CALC BMI ABV UP PARAM F/U: HCPCS | Performed by: NURSE PRACTITIONER

## 2021-10-13 PROCEDURE — G8400 PT W/DXA NO RESULTS DOC: HCPCS | Performed by: NURSE PRACTITIONER

## 2021-10-13 RX ORDER — DIVALPROEX SODIUM 500 MG/1
500 TABLET, DELAYED RELEASE ORAL 2 TIMES DAILY
Qty: 60 TABLET | Refills: 2 | Status: ON HOLD | OUTPATIENT
Start: 2021-10-13 | End: 2022-04-07

## 2021-10-13 RX ORDER — IPRATROPIUM BROMIDE AND ALBUTEROL SULFATE 2.5; .5 MG/3ML; MG/3ML
3 SOLUTION RESPIRATORY (INHALATION) 3 TIMES DAILY
Qty: 360 ML | Refills: 0 | Status: SHIPPED | OUTPATIENT
Start: 2021-10-13

## 2021-10-13 RX ORDER — SULFAMETHOXAZOLE AND TRIMETHOPRIM 800; 160 MG/1; MG/1
1 TABLET ORAL ONCE
Qty: 1 TABLET | Refills: 0 | Status: SHIPPED | OUTPATIENT
Start: 2021-10-13 | End: 2021-10-13

## 2021-10-13 RX ORDER — LEVETIRACETAM 750 MG/1
750 TABLET ORAL 2 TIMES DAILY
Qty: 60 TABLET | Refills: 3 | Status: SHIPPED | OUTPATIENT
Start: 2021-10-13 | End: 2021-10-13 | Stop reason: ALTCHOICE

## 2021-10-13 RX ORDER — LORAZEPAM 1 MG/1
1 TABLET ORAL ONCE
Qty: 1 TABLET | Refills: 0 | Status: SHIPPED | OUTPATIENT
Start: 2021-10-13 | End: 2021-10-13

## 2021-10-13 RX ORDER — LEVETIRACETAM 500 MG/1
500 TABLET ORAL 2 TIMES DAILY
COMMUNITY
Start: 2021-06-17 | End: 2021-10-13 | Stop reason: DRUGHIGH

## 2021-10-13 RX ORDER — SULFAMETHOXAZOLE AND TRIMETHOPRIM 400; 80 MG/1; MG/1
1 TABLET ORAL 2 TIMES DAILY
Qty: 18 TABLET | Refills: 0 | Status: SHIPPED | OUTPATIENT
Start: 2021-10-14 | End: 2021-10-23

## 2021-10-13 ASSESSMENT — ENCOUNTER SYMPTOMS
WHEEZING: 0
NAUSEA: 0
TROUBLE SWALLOWING: 0
COUGH: 0
ABDOMINAL DISTENTION: 0
VOMITING: 0
COLOR CHANGE: 0
CHEST TIGHTNESS: 0
SHORTNESS OF BREATH: 0
ABDOMINAL PAIN: 0

## 2021-10-13 NOTE — PROGRESS NOTES
Subjective:      Patient ID: Afshan Ndiaye is a 67 y.o. female who presents today for:  Chief Complaint   Patient presents with    New Patient     Pt's  says that she started having seizure about 20 years ago, but they had stopped for a while. He says they started back again recently. He says that she will do things like beat on her head and become non responsive, and lasts for a few minutes, he says that she becomes lathargic, and tired. He says they have been happening fequently at least two times a day right now. Pt has been takign keppra for a couple months now but it is not doing anything for her really. HPI  Pt seen and examined in the office for hospital follow up. Pt was admitted to [facility] from 3/18/2021 to 4/2/2021 for cardiac arrest, acute hypoxic respiratory failure requiring intubation, acute hypoxic ischemic encephalopathy and possible seizure. [facility] records reviewed. Neurology was consulted for encephalopathy and seizure-like activity. Patient had 2 episodes of shaking of left arm followed by head shaking and rolling back of the eyes with some associated dysarthria that lasted for several minutes.  reported patient has been having similar episodes since October of the previous year following cardiac arrest.  At that time she was having about 1 episode a week. Patient also has history of diagnosed pseudoseizures 20 years prior while they were living in [place]. Patient also with significant anxiety and panic attacks. Patient was diagnosed with complex partial seizures versus pseudoseizure. EEG was obtained which showed mildly abnormal findings with diffuse slowing with diffuse cerebral dysfunction seen in toxic metabolic etiology with some possible suggestion of hypoxemia given her recent cardiac arrest.  MRI of the brain was recommended but patient declined due to claustrophobia.   CT of the head was done that showed chronic involutional atrophic changes and evidence of prior remote ischemia that was stable since her previous studies. Patient presents today alert and oriented x3, no acute distress, cooperative. She is accompanied by her .  reports that patient's seizure activity has been increasing recently. He reports she will have 4-5 episodes a day at times. He reports episodes will last for 10 to 15 minutes at a time and consist of shaking of the upper extremities striking of the head, inability to talk, protruding of the tongue. There is no loss of bowel or bladder or tongue biting. He reports these events are often triggered by stress and anxiety. Patient remains on Keppra 500 mg twice daily at this time. Patient currently being followed by palliative care. Past Medical History:   Diagnosis Date    Anxiety     Asthma     dx 2019 / has smoked since age 14    [facility]congestive heart failure) ([facility])     Chronic back pain     Bilateral L5 S1 Radic on emg--surprisingly worse on the left than the right--pt's symptoms and her MRI show worse on the right    Chronic obstructive pulmonary disease with acute exacerbation ([address] 10/12/2019    Depression     ESBL E. coli carrier     Carrier.  Fibromyalgia     Gastrointestinal hemorrhage 2/24/2020    Hyperlipidemia     meds > 8 yrs    Hypertension     meds > 45 yrs    On home O2     2l per n/c at bedtime mostly,     Osteoarthritis     Seizures ([address]     Smoker 6/18/2013    Type II diabetes mellitus, uncontrolled ([address]     hx > 8 yrs    Unspecified sleep apnea      Past Surgical History:   Procedure Laterality Date    BACK SURGERY  2017    lumbar disc    CARDIAC CATHETERIZATION  11/03/2014    DR. MIRELES / no stents    COLONOSCOPY  08/29/2016    w/polypectomy     COLONOSCOPY N/A 09/29/2020    COLONOSCOPY WITH POLYPECTOMY performed by Clemente Marks MD at [facility] N/A 10/07/2019    EUA HYSTEROSCOPY DILATATION AND in [place]     Lives With:  [id number] Spouse    Type of Home: House    Home Layout: Two level, Performs ADL's on one level    Home Access: Stairs to enter with rails    Entrance Stairs - Number of Steps: 4    Bathroom Shower/Tub: Tub/Shower unit, Doors    Bathroom Equipment: Shower chair, Grab bars in [address]: Rolling walker, Cane, Oxygen(uses her O2 very seldom)    ADL Assistance: Needs assistance    Homemaking Assistance: Needs assistance    Homemaking Responsibilities: No(spouse performs)    Ambulation Assistance: Independent    Transfer Assistance: Independent    Active : No    Occupation: Retired    Type of occupation: worked in [employer]: patient enjoys [address] [place] [address] Strain: Low Risk     Difficulty of Paying Living Expenses: Not hard at [employer] Insecurity: No Food Insecurity    Worried About [facility] in the Last Year: Never true    [address]  [employer] in the Last Year: Never true   Transportation Needs: No Transportation Needs    Lack of Transportation (Medical): No    Lack of Transportation (Non-Medical): No   Physical Activity: Inactive    Days of Exercise per Week: 0 days    Minutes of Exercise per Session: 0 min   Stress: No Stress Concern Present    Feeling of Stress : Only a little   Social Connections:  Moderately Integrated    Frequency of Communication with Friends and Family: More than three times a week    Frequency of Social Gatherings with Friends and Family: More than three times a week    Attends Taoist Services: 1 to 4 times per year    Active Member of [address] [place] [address] or Organizations: No    Attends Club or Organization Meetings: Never    Marital Status: Living with partner   Intimate Partner Violence: Not At Risk    Fear of Current or Ex-Partner: No    Emotionally Abused: No    Physically Abused: No    Sexually Abused: No     Family History   Problem Relation Age of Onset    Heart Disease Father         cardiac bypass    Arthritis Father     Arthritis Mother     Other Mother          at age 80    Other Sister         [facility]    No Known Problems Daughter     Stroke Son      Allergies   Allergen Reactions    Ibuprofen Nausea Only    Metformin And Related      Diarrhea      Darvon [Propoxyphene Hcl] Nausea And Vomiting     Current Outpatient Medications on File Prior to Visit   Medication Sig Dispense Refill    Cranberry 500 MG CAPS Take 500 mg by mouth daily      insulin glargine (LANTUS) 100 UNIT/ML injection vial 60 units at bedtime 3 each 3    pantoprazole (PROTONIX) 40 MG tablet Take 1 tablet by mouth every morning (before breakfast) 90 tablet 3    Cholecalciferol (VITAMIN D3) 50 MCG (2000 UT) CAPS Take 2,000 Units by mouth Daily      ACCU-CHEK PHYLLIS PLUS strip Test 3x daily Dx E11.65 100 each 3    amLODIPine (NORVASC) 5 MG tablet Take 1 tablet by mouth daily 90 tablet 1    aspirin 81 MG EC tablet Take 1 tablet by mouth daily 30 tablet 3    PARoxetine (PAXIL) 40 MG tablet TAKE 1 TABLET BY MOUTH ONCE DAILY IN THE MORNING 90 tablet 1    lidocaine-prilocaine (EMLA) 2.5-2.5 % cream APPLY CREAM TOPICALLY TO PORT SITE ONE HOUR PRIOR TO APPOINTMENT AS NEEDED      Handicap Placard MISC by Does not apply route Good from 7/15/21 - 7/15/26 1 each 0    rosuvastatin (CRESTOR) 40 MG tablet TAKE 1 TABLET BY MOUTH ONCE DAILY IN THE EVENING 90 tablet 3    carvedilol (COREG) 25 MG tablet Take 1.5 tablets by mouth 2 times daily (with meals) 270 tablet 2    ipratropium-albuterol (DUONEB) 0.5-2.5 (3) MG/3ML SOLN nebulizer solution Inhale 3 mLs into the lungs 3 times daily 360 mL 0    insulin lispro (HUMALOG) 100 UNIT/ML injection vial 18 units at each mels 1 vial 3    Accu-Chek Softclix Lancets MISC bid 100 each 3    Blood Glucose Monitoring Suppl (ACCU-CHEK PHYLLIS CONNECT) w/Device KIT 1 kit by Does not apply route daily 1 kit 0    Continuous Blood Gluc Sensor (FREESTYLE MURALI 14 DAY SENSOR) [facility] Use every 2 weeks 2 each 1    Continuous Blood Gluc  (FREESTYLE MURALI 14 DAY READER) [place] As directed 1 Device 0     No current facility-administered medications on file prior to visit. Review of Systems   Constitutional: Negative for appetite change, chills, fatigue, fever and unexpected weight change. HENT: Negative for hearing loss and trouble swallowing. Eyes: Negative for visual disturbance. Respiratory: Negative for cough, chest tightness, shortness of breath and wheezing. Cardiovascular: Negative for chest pain, palpitations and leg swelling. Gastrointestinal: Negative for abdominal distention, abdominal pain, nausea and vomiting. Musculoskeletal: Positive for gait problem. Skin: Negative for color change and rash. Neurological: Positive for seizures. Negative for dizziness, tremors, syncope, facial asymmetry, speech difficulty, weakness, light-headedness, numbness and headaches. Psychiatric/Behavioral: Positive for behavioral problems. Negative for agitation, confusion and hallucinations. The patient is nervous/anxious. Objective:   /68 (Site: Left Upper Arm, Position: Sitting, Cuff Size: Medium Adult)   Pulse 78   LMP  (LMP Unknown)     Physical Exam  Vitals reviewed. Constitutional:       General: She is not in acute distress. Appearance: She is not ill-appearing or diaphoretic. HENT:      Head: Normocephalic and atraumatic. Eyes:      General: No visual field deficit. Extraocular Movements: Extraocular movements intact. Pupils: Pupils are equal, round, and reactive to light. Cardiovascular:      Rate and Rhythm: Normal rate and regular rhythm. Pulmonary:      Effort: Pulmonary effort is normal. No respiratory distress. Breath sounds: Normal breath sounds. Abdominal:      General: Bowel sounds are normal.      Palpations: Abdomen is soft. Skin:     General: Skin is warm and dry. Neurological:      General: No focal deficit present. Mental Status: She is alert and oriented to person, place, and time. Cranial Nerves: No cranial nerve deficit, dysarthria or facial asymmetry. Motor: No weakness, tremor, atrophy, abnormal muscle tone, seizure activity or pronator drift. Coordination: Coordination normal. Finger-Nose-Finger Test normal. Rapid alternating movements normal.         IR FLUORO GUIDED CVA DEVICE PLACEMENT    Result Date: 5/17/2021  Impression: 1. Successful placement of a central venous catheter with subcutaneous reservoir in the internal jugular vein. Additional clinical data: Patient needing long-term IV access for anemia Procedure: 1. Ultrasound guidance for vascular access. Ultrasound images saved to PACS. 2.   Fluoroscopic guidance for placement of a central line. 3.   Placement of a central venous line with subcutaneous reservoir using the right internal jugular vein. 4.   IV Conscious sedation. Radiation dose: 5.92 mGy. Body of Report: Pre-procedure evaluation confirmed that the patient was an appropriate candidate for conscious sedation. Adequate sedation was maintained during the entire procedure. Vital signs, pulse oximetry, and response to verbal commands were monitored and recorded by the nurse throughout the procedure and the recovery period. The flow sheet was placed in the medical record including the medications and dosages used. The patient returned to baseline neurologic and physiologic status prior to leaving the department. No immediate sedation related complications were noted. Medication for conscious sedation was administered via IV route. 60 minutes of conscious sedation was provided. Informed and written consent was obtained from the patient following discussion of risks, benefits and alternatives to this procedure. The was patient placed supine on the angiographic table.   The patient's neck and chest were then prepped and draped in  normal sterile fashion. A small amount of local lidocaine anesthesia was injected subcutaneously. Ultrasound was used to study the jugular vein we intended to use prior to accessing it. The vein was patent. Ultrasound images of the blood vessel are saved to PACS. Using ultrasound access, puncture was made of the right internal jugular vein using a  18 GA needle. A wire was advanced into the IVC, a short incision was made at the puncture site and serial dilatation performed. Next an incision was made at the site of the subcutaneous reservoir. Blunt dissection was used to open a pocket to contain the reservoir. A subcutaneous tunnel was created from the puncture site to the port site. A 8 [place] single lumen catheter was advanced through the tunnel. The catheter was attached to the port and this was placed in the subcutaneous pocket. The port was affixed to the underlying fascia using two monofilament sutures. Under fluoroscopic guidance, a peel away sheath was placed  and the line was advanced into the central veins. The tip of the line lies at the SVC/right atrial junction. The line flushes and aspirates appropriately. The line was flushed with 10 cc of normal saline, and then blocked with 5 cc of 100 units/cc heparin. The pocket was sutured with interrupted inverted resorbable 30 suture, and the skin closed using dermabond. The port was not left accessed. Care instructions were given to the patient and affixed to the chart. The patient was discharged in stable condition. IR PORT PLACEMENT > 5 YEARS    Result Date: 5/17/2021  Impression: 1. Successful placement of a central venous catheter with subcutaneous reservoir in the internal jugular vein. Additional clinical data: Patient needing long-term IV access for anemia Procedure: 1. Ultrasound guidance for vascular access. Ultrasound images saved to PACS. 2.   Fluoroscopic guidance for placement of a central line.  3.   Placement of a central venous line with subcutaneous reservoir using the right internal jugular vein. 4.   IV Conscious sedation. Radiation dose: 5.92 mGy. Body of Report: Pre-procedure evaluation confirmed that the patient was an appropriate candidate for conscious sedation. Adequate sedation was maintained during the entire procedure. Vital signs, pulse oximetry, and response to verbal commands were monitored and recorded by the nurse throughout the procedure and the recovery period. The flow sheet was placed in the medical record including the medications and dosages used. The patient returned to baseline neurologic and physiologic status prior to leaving the department. No immediate sedation related complications were noted. Medication for conscious sedation was administered via IV route. 60 minutes of conscious sedation was provided. Informed and written consent was obtained from the patient following discussion of risks, benefits and alternatives to this procedure. The was patient placed supine on the angiographic table. The patient's neck and chest were then prepped and draped in  normal sterile fashion. A small amount of local lidocaine anesthesia was injected subcutaneously. Ultrasound was used to study the jugular vein we intended to use prior to accessing it. The vein was patent. Ultrasound images of the blood vessel are saved to PACS. Using ultrasound access, puncture was made of the right internal jugular vein using a  18 GA needle. A wire was advanced into the IVC, a short incision was made at the puncture site and serial dilatation performed. Next an incision was made at the site of the subcutaneous reservoir. Blunt dissection was used to open a pocket to contain the reservoir. A subcutaneous tunnel was created from the puncture site to the port site. A 8 [place] single lumen catheter was advanced through the tunnel.   The catheter was attached to the port and this was placed in the subcutaneous pocket. The port was affixed to the underlying fascia using two monofilament sutures. Under fluoroscopic guidance, a peel away sheath was placed  and the line was advanced into the central veins. The tip of the line lies at the SVC/right atrial junction. The line flushes and aspirates appropriately. The line was flushed with 10 cc of normal saline, and then blocked with 5 cc of 100 units/cc heparin. The pocket was sutured with interrupted inverted resorbable 30 suture, and the skin closed using dermabond. The port was not left accessed. Care instructions were given to the patient and affixed to the chart. The patient was discharged in stable condition.        Lab Results   Component Value Date    WBC 5.6 10/01/2021    RBC 3.84 10/01/2021    HGB 11.0 10/01/2021    HCT 33.9 10/01/2021    MCV 88.3 10/01/2021    MCH 28.6 10/01/2021    MCHC 32.4 10/01/2021    RDW 14.7 10/01/2021     10/01/2021    MPV 8.8 08/01/2015     Lab Results   Component Value Date     06/10/2021    K 5.1 06/10/2021    K 4.4 10/19/2020     06/10/2021    CO2 21 06/10/2021    BUN 36 06/10/2021    CREATININE 2.17 06/10/2021    GFRAA 26.6 06/10/2021    LABGLOM 22.0 06/10/2021    GLUCOSE 218 06/10/2021    PROT 6.2 04/29/2021    LABALBU 4.2 06/10/2021    CALCIUM 10.1 06/10/2021    BILITOT <0.2 04/29/2021    ALKPHOS 118 04/29/2021    AST 20 04/29/2021    ALT 12 04/29/2021     Lab Results   Component Value Date    PROTIME 14.4 04/29/2021    INR 1.1 04/29/2021     Lab Results   Component Value Date    TSH 0.474 11/30/2020    BWYHSFHW51 1255 11/30/2020    FOLATE 11.1 11/30/2020    FERRITIN 710.2 06/10/2021    IRON 95 06/10/2021    TIBC 297 06/10/2021     Lab Results   Component Value Date    TRIG 64 11/30/2020    HDL 61 11/30/2020    LDLCALC 69 11/30/2020     Lab Results   Component Value Date    LABAMPH Neg 07/07/2019    BARBSCNU Neg 07/07/2019    LABBENZ Neg 07/07/2019    OPIATESCREENURINE Neg 07/07/2019 PHENCYCLIDINESCREENURINE Neg 07/07/2019    ETOH <10 03/18/2021     No results found for: LITHIUM, DILFRTOT, VALPROATE    Assessment and Plan:      1. Seizure ([address]  - MRI BRAIN WO CONTRAST; Future  - EEG video monitoring; Future  - CBC; Future  - Comprehensive Metabolic Panel; Future  - Levetiracetam Level; Future  - levETIRAcetam (KEPPRA) 750 MG tablet; Take 1 tablet by mouth 2 times daily  Dispense: 60 tablet; Refill: 3    2. Moderate hypoxic-ischemic encephalopathy    3. Hospital discharge follow-up  - Pt seen and examined in the office for hospital follow up. Pt was admitted to [facility] from 3/18/2021 to 4/2/2021 for cardiac arrest, acute hypoxic respiratory failure requiring intubation, acute hypoxic ischemic encephalopathy and possible seizure. [facility] records reviewed. Neurology was consulted for encephalopathy and seizure-like activity. Patient had 2 episodes of shaking of left arm followed by head shaking and rolling back of the eyes with some associated dysarthria that lasted for several minutes.  reported patient has been having similar episodes since October of the previous year following cardiac arrest.  At that time she was having about 1 episode a week. Patient also has history of diagnosed pseudoseizures 20 years prior while they were living in [place]. Patient also with significant anxiety and panic attacks. Patient was diagnosed with complex partial seizures versus pseudoseizure. EEG was obtained which showed mildly abnormal findings with diffuse slowing with diffuse cerebral dysfunction seen in toxic metabolic etiology with some possible suggestion of hypoxemia given her recent cardiac arrest.  MRI of the brain was recommended but patient declined due to claustrophobia.   CT of the head was done that showed chronic involutional atrophic changes and evidence of prior remote ischemia that was stable since her previous studies.  -Patient today with reported ongoing seizure-like activity occurring as often as 4-5 times daily. Patient with significant anxiety as well. Seizure-like activity is also been triggered by stress and anxiety. At this time we feel it would be best to get video EEG monitoring to further assess seizure versus pseudoseizure. Patient is now agreeable to obtain MRI of the brain and we will prescribe Ativan for pretreatment given her claustrophobia. We will discontinue Keppra as it may be increasing patient's underlying anxiety and switch to Depakote to help with both seizure-like activity as well as behavioral disturbances. Last LFTs were done 4/29/2021 and noted to be normal.  Will assess repeat laboratory testing today to include CBC, CMP, and levetiracetam level for both medication monitoring purposes as well as reports of ongoing seizure activity to assess for any underlying metabolic disturbances. 4. Claustrophobia  - LORazepam (ATIVAN) 1 MG tablet; Take 1 tablet by mouth once for 1 dose. Take 30 mins prior to MRI  Dispense: 1 tablet; Refill: 0      Return in about 3 months (around 1/13/2022), or if symptoms worsen or fail to improve.     JOHNATHON Brooks - CNP     Collaborating Physician Dr Yossi Whitt

## 2021-10-13 NOTE — TELEPHONE ENCOUNTER
Patient is requesting surgical sedation. New order will need to be placed and state in the note \"with sedation\" and Yobani Galdamez will be able to schedule her. Thanks!

## 2021-10-13 NOTE — TELEPHONE ENCOUNTER
[address]  JAYDEN Alicia [facility] Clinical Staff  Subject: Refill Request     QUESTIONS   Name of Medication? ipratropium-albuterol (DUONEB) 0.5-2.5 (3) MG/3ML SOLN   nebulizer solution   Patient-reported dosage and instructions? 0.5-2.5   How many days do you have left? 1   Preferred Pharmacy? [facility] phone number (if available)? [phone]   ---------------------------------------------------------------------------   --------------   [place] INFO   What is the best way for the office to contact you? OK to leave message on   voicemail   Preferred Call Back Phone Number?  [phone]

## 2021-10-14 ENCOUNTER — TELEPHONE (OUTPATIENT)
Dept: NEUROLOGY | Age: 76
End: 2021-10-14

## 2021-10-14 DIAGNOSIS — R56.9 SEIZURE (HCC): Primary | ICD-10-CM

## 2021-10-14 NOTE — TELEPHONE ENCOUNTER
Video EEG order pended for Susi's patient to be done at [facility] ([place], [place]). Please sign.

## 2021-10-20 ENCOUNTER — TELEPHONE (OUTPATIENT)
Dept: NEUROLOGY | Age: 76
End: 2021-10-20

## 2021-10-20 NOTE — TELEPHONE ENCOUNTER
Per Amy Pedraza at [facility], patient does not want to have video EEG as they only do it at [facility], she does not drive that far.

## 2021-10-27 ENCOUNTER — TELEPHONE (OUTPATIENT)
Dept: PALLATIVE CARE | Age: 76
End: 2021-10-27

## 2021-10-27 DIAGNOSIS — J44.1 COPD WITH ACUTE EXACERBATION (HCC): ICD-10-CM

## 2021-10-27 DIAGNOSIS — Z51.5 PALLIATIVE CARE ENCOUNTER: ICD-10-CM

## 2021-10-27 DIAGNOSIS — J44.9 CHRONIC OBSTRUCTIVE PULMONARY DISEASE, UNSPECIFIED COPD TYPE (HCC): Primary | ICD-10-CM

## 2021-10-27 RX ORDER — PREDNISONE 10 MG/1
TABLET ORAL
Qty: 30 TABLET | Refills: 0 | Status: SHIPPED | OUTPATIENT
Start: 2021-10-27 | End: 2021-12-09 | Stop reason: ALTCHOICE

## 2021-10-27 RX ORDER — DOXYCYCLINE HYCLATE 100 MG/1
100 CAPSULE ORAL 2 TIMES DAILY
Qty: 20 CAPSULE | Refills: 0 | Status: SHIPPED | OUTPATIENT
Start: 2021-10-27 | End: 2021-11-06

## 2021-10-27 RX ORDER — GREEN TEA/HOODIA GORDONII 315-12.5MG
1 CAPSULE ORAL 2 TIMES DAILY
Qty: 60 TABLET | Refills: 0 | Status: SHIPPED | OUTPATIENT
Start: 2021-10-27 | End: 2021-11-26

## 2021-10-27 NOTE — TELEPHONE ENCOUNTER
Patient reports increasing SOB with exertion over the last couple of weeks. Reports SPO2 at 96-97%. No fevers. She does report wheezing and has been using her breathing machine. No leg swelling. Sugars have been running 150-200. No recent weight gain. Patient agreeable to starting antibiotic and steroid. She understands that the steroids will likely cause her blood sugars to rise. Please take floranex 1 tab po BID as well as patient was recently on antibiotic for UTI. Continue eating yogurt daily.

## 2021-11-03 ENCOUNTER — OFFICE VISIT (OUTPATIENT)
Dept: PALLATIVE CARE | Age: 76
End: 2021-11-03
Payer: MEDICARE

## 2021-11-03 VITALS
SYSTOLIC BLOOD PRESSURE: 96 MMHG | DIASTOLIC BLOOD PRESSURE: 63 MMHG | OXYGEN SATURATION: 99 % | TEMPERATURE: 97.6 F | HEART RATE: 80 BPM

## 2021-11-03 DIAGNOSIS — Z51.5 PALLIATIVE CARE ENCOUNTER: ICD-10-CM

## 2021-11-03 DIAGNOSIS — I50.41 ACUTE COMBINED SYSTOLIC AND DIASTOLIC CHF, NYHA CLASS 1 (HCC): ICD-10-CM

## 2021-11-03 DIAGNOSIS — J44.9 CHRONIC OBSTRUCTIVE PULMONARY DISEASE, UNSPECIFIED COPD TYPE (HCC): Primary | ICD-10-CM

## 2021-11-03 DIAGNOSIS — R56.9 SEIZURES (HCC): ICD-10-CM

## 2021-11-03 PROCEDURE — 1090F PRES/ABSN URINE INCON ASSESS: CPT | Performed by: NURSE PRACTITIONER

## 2021-11-03 PROCEDURE — 4040F PNEUMOC VAC/ADMIN/RCVD: CPT | Performed by: NURSE PRACTITIONER

## 2021-11-03 PROCEDURE — G8484 FLU IMMUNIZE NO ADMIN: HCPCS | Performed by: NURSE PRACTITIONER

## 2021-11-03 PROCEDURE — 1036F TOBACCO NON-USER: CPT | Performed by: NURSE PRACTITIONER

## 2021-11-03 PROCEDURE — G8417 CALC BMI ABV UP PARAM F/U: HCPCS | Performed by: NURSE PRACTITIONER

## 2021-11-03 PROCEDURE — 99350 HOME/RES VST EST HIGH MDM 60: CPT | Performed by: NURSE PRACTITIONER

## 2021-11-03 PROCEDURE — 1123F ACP DISCUSS/DSCN MKR DOCD: CPT | Performed by: NURSE PRACTITIONER

## 2021-11-03 ASSESSMENT — ENCOUNTER SYMPTOMS
VOICE CHANGE: 0
TROUBLE SWALLOWING: 0
CONSTIPATION: 0
SHORTNESS OF BREATH: 1
ABDOMINAL DISTENTION: 0
COUGH: 1
BLOOD IN STOOL: 0
NAUSEA: 0
WHEEZING: 0
DIARRHEA: 0
BACK PAIN: 0

## 2021-11-03 NOTE — PROGRESS NOTES
[place].  Fibromyalgia     Gastrointestinal hemorrhage 2/24/2020    Hyperlipidemia     meds > 8 yrs    Hypertension     meds > 45 yrs    On home O2     2l per n/c at bedtime mostly,     Osteoarthritis     Seizures ([facility]     Smoker 6/18/2013    Type II diabetes mellitus, uncontrolled ([facility]     hx > 8 yrs    Unspecified sleep apnea      Past Surgical History:   Procedure Laterality Date    BACK SURGERY  2017    lumbar disc    CARDIAC CATHETERIZATION  11/03/2014    DR. MIRELES / no stents    COLONOSCOPY  08/29/2016    w/polypectomy     COLONOSCOPY N/A 09/29/2020    COLONOSCOPY WITH POLYPECTOMY performed by Caio Celaya MD at [facility] N/A 10/07/2019    EUA HYSTEROSCOPY DILATATION AND CURETTAGE performed by Falguni Banks DO at [facility]. [address], COLON, DIAGNOSTIC      EYE SURGERY      [place] with IOL OU / [id number]    umbilical hernia repair    IR PORT PLACEMENT EQUAL OR GREATER THAN 5 YEARS  5/14/2021    IR PORT PLACEMENT EQUAL OR GREATER THAN 5 YEARS 5/14/2021 [facility] SPECIAL PROCEDURE    CA ESOPHAGOGASTRODUODENOSCOPY TRANSORAL DIAGNOSTIC N/A 03/24/2017    EGD ESOPHAGOGASTRODUODENOSCOPY performed by Mattie Craig MD at [place] [address] 02/08/2018    negative findings    CA REVISE MEDIAN N/CARPAL TUNNEL SURG Left 06/05/2017    LEFT  CARPAL TUNNEL RELEASE performed by Charo Smith MD at [facility],7+R/A,BFWGT N/A 02/08/2018    TONSILLECTOMY      as child    TUNNELED VENOUS PORT PLACEMENT Right 05/14/2021    [employer] [address] [employer] Port ClearVUE by Dr. Bella Ramirez  08/26/2016    w/bx     UPPER GASTROINTESTINAL ENDOSCOPY N/A 02/25/2020    EGD possible biopsy performed by Caio Celaya MD at [address] [place] [address] 07/01/2020    EGD PUSH ENTEROSCOPY performed by Farhana Jaime MD [facility]     Social History     Socioeconomic History    Marital status:      Spouse name: Vassie Boas Number of children: 2    Years of education: 15    Highest education level: High school graduate   Occupational History    Occupation: Retired-   Tobacco Use    Smoking status: Former Smoker     Packs/day: 1.00     Years: 59.00     Pack years: 59.00     Types: Cigarettes     Start date: 2017     Quit date: 10/1/2020     Years since quittin.0    Smokeless tobacco: Never Used   Vaping Use    Vaping Use: Never used   Substance and Sexual Activity    Alcohol use: Not Currently    Drug use: No    Sexual activity: Yes     Partners: Male   Other Topics Concern    Not on file   Social History Narrative    Grew up in [address] [place] [address] With:  [id number] Spouse    Type of Home: House    Home Layout: Two level, Performs ADL's on one level    Home Access: Stairs to enter with rails    Entrance Stairs - Number of Steps: 4    Bathroom Shower/Tub: Tub/Shower unit, Doors    Bathroom Equipment: Shower chair, Grab bars in [place]: Rolling walker, Cane, Oxygen(uses her O2 very seldom)    ADL Assistance: Needs assistance    Homemaking Assistance: Needs assistance    Homemaking Responsibilities: No(spouse performs)    Ambulation Assistance: Independent    Transfer Assistance: Independent    Active : No    Occupation: Retired    Type of occupation: worked in [employer]: patient enjoys [address] [place] [address] Strain:     Difficulty of Paying Living Expenses:    Food Insecurity:     Worried About [facility] in the Last Year:    [address] in the Last Year:    Transportation Needs:     Lack of Transportation (Medical):      Lack of Transportation (Non-Medical):    Physical Activity:     Days of Exercise per Week:     Minutes of Exercise per Session:    Stress:     Feeling of Stress : Social Connections:     Frequency of Communication with Friends and Family:     Frequency of Social Gatherings with Friends and Family:     Attends Scientologist Services:     Active Member of Clubs or Organizations:     Attends Club or Organization Meetings:     Marital Status:    Intimate Partner Violence:     Fear of Current or Ex-Partner:     Emotionally Abused:     Physically Abused:     Sexually Abused:      Family History   Problem Relation Age of Onset    Heart Disease Father         cardiac bypass    Arthritis Father     Arthritis Mother     Other Mother          at age 80    Other Sister         [facility]    No Known Problems Daughter     Stroke Son      Allergies   Allergen Reactions    Ibuprofen Nausea Only    Metformin And Related      Diarrhea      Darvon [Propoxyphene Hcl] Nausea And Vomiting     Current Outpatient Medications on File Prior to Visit   Medication Sig Dispense Refill    doxycycline hyclate (VIBRAMYCIN) 100 MG capsule Take 1 capsule by mouth 2 times daily for 10 days 20 capsule 0    predniSONE (DELTASONE) 10 MG tablet Take 4 tabs x 3 days then 3 tabs x 3 days then 2 tabs x 3 days then 1 tab x 3 days.  30 tablet 0    Probiotic Acidophilus (FLORANEX) TABS Take 1 tablet by mouth 2 times daily 60 tablet 0    albuterol-ipratropium (COMBIVENT RESPIMAT)  MCG/ACT AERS inhaler Inhale 1 puff into the lungs every 6 hours as needed for Wheezing or Shortness of Breath 4 g 5    ipratropium-albuterol (DUONEB) 0.5-2.5 (3) MG/3ML SOLN nebulizer solution Inhale 3 mLs into the lungs 3 times daily 360 mL 0    Cranberry 500 MG CAPS Take 500 mg by mouth daily      insulin glargine (LANTUS) 100 UNIT/ML injection vial 60 units at bedtime 3 each 3    pantoprazole (PROTONIX) 40 MG tablet Take 1 tablet by mouth every morning (before breakfast) 90 tablet 3    Cholecalciferol (VITAMIN D3) 50 MCG (2000 UT) CAPS Take 2,000 Units by mouth Daily      amLODIPine (NORVASC) 5 MG tablet Take 1 tablet by mouth daily 90 tablet 1    aspirin 81 MG EC tablet Take 1 tablet by mouth daily 30 tablet 3    PARoxetine (PAXIL) 40 MG tablet TAKE 1 TABLET BY MOUTH ONCE DAILY IN THE MORNING 90 tablet 1    rosuvastatin (CRESTOR) 40 MG tablet TAKE 1 TABLET BY MOUTH ONCE DAILY IN THE EVENING 90 tablet 3    carvedilol (COREG) 25 MG tablet Take 1.5 tablets by mouth 2 times daily (with meals) 270 tablet 2    insulin lispro (HUMALOG) 100 UNIT/ML injection vial 18 units at each mels 1 vial 3    divalproex (DEPAKOTE) 500 MG DR tablet Take 1 tablet by mouth 2 times daily (Patient not taking: Reported on 11/3/2021) 60 tablet 2    ACCU-CHEK PHYLLIS PLUS strip Test 3x daily Dx E11.65 100 each 3    lidocaine-prilocaine (EMLA) 2.5-2.5 % cream APPLY CREAM TOPICALLY TO PORT SITE ONE HOUR PRIOR TO APPOINTMENT AS NEEDED      Handicap Placard MISC by Does not apply route Good from 7/15/21 - 7/15/26 1 each 0    Accu-Chek Softclix Lancets MISC bid 100 each 3    Blood Glucose Monitoring Suppl (ACCU-CHEK PHYLLIS CONNECT) w/Device KIT 1 kit by Does not apply route daily 1 kit 0    Continuous Blood Gluc Sensor (FREESTYLE MURALI 14 DAY SENSOR) MISC Use every 2 weeks 2 each 1    Continuous Blood Gluc  (FREESTYLE MURALI 14 DAY READER) [place] As directed 1 Device 0     No current facility-administered medications on file prior to visit. Review of Systems   Constitutional: Positive for fatigue. Negative for activity change, appetite change, fever and unexpected weight change. HENT: Negative for trouble swallowing and voice change. Respiratory: Positive for cough and shortness of breath (with exertion). Negative for wheezing. Cardiovascular: Negative for chest pain and leg swelling. Gastrointestinal: Negative for abdominal distention, blood in stool, constipation, diarrhea and nausea. Genitourinary: Negative. Musculoskeletal: Positive for gait problem. Negative for back pain. Skin: Negative. Neurological: Positive for seizures, speech difficulty and weakness. Negative for dizziness. Psychiatric/Behavioral: Negative for confusion, dysphoric mood and sleep disturbance. The patient is not nervous/anxious. Objective:   BP 96/63   Pulse 80   Temp 97.6 °F (36.4 °C)   LMP  (LMP Unknown)   SpO2 99%    Wt Readings from Last 3 Encounters:   09/02/21 156 lb 3.2 oz (70.9 kg)   08/06/21 152 lb (68.9 kg)   05/25/21 141 lb (64 kg)     Physical Exam  Constitutional:       General: She is not in acute distress. HENT:      Head: Normocephalic and atraumatic. Eyes:      General: No scleral icterus. Right eye: No discharge. Left eye: No discharge. Extraocular Movements: Extraocular movements intact. Conjunctiva/sclera: Conjunctivae normal.   Cardiovascular:      Rate and Rhythm: Normal rate and regular rhythm. Heart sounds: Normal heart sounds. Pulmonary:      Effort: Pulmonary effort is normal.      Breath sounds: Decreased breath sounds and wheezing (faint wheeze on expiration in right posterior base.) present. Abdominal:      General: Bowel sounds are normal. There is no distension. Palpations: Abdomen is soft. Tenderness: There is no abdominal tenderness. Musculoskeletal:      Cervical back: Normal range of motion and neck supple. Right lower leg: No edema. Left lower leg: No edema. Skin:     General: Skin is warm and dry. Neurological:      Mental Status: She is alert and oriented to person, place, and time. Mental status is at baseline. Psychiatric:         Mood and Affect: Mood normal.         Behavior: Behavior normal.         Assessment and Plan:      1. Chronic obstructive pulmonary disease, unspecified COPD type ([facility]  I am not convinced that patient's COPD is the source of her breathing issues. Lungs are mostly clear with faint wheezing in the right posterior base.  She has been on antibiotics and steroids for several days with minimal improvement in SOB with exertion. Patient is very fatigued which is chronic. SPO2 at 99% and no signs of hypoxia. Source of increased SOB with exertion and fatigue could be cardiac related. Patient is going to follow up with Dr. Cleo Hamilton on Friday and update me on what he says. No red flags or signs of acute respiratory distress. ER visits are not in align with patients goals of care. Continue COPD directed therapies. Call for increased SOB, cough, sputum production, excessive fatigue, fever, chills, or myalgias. Activity as tolerated. 2. Acute combined systolic and diastolic CHF  See above. Monitor weight daily, call if you gain 3 lbs in one day or 5 lbs. in one week or for increased edema, sob, cough, orthopnea, or chest pain. Follow-up with cardiology on Friday. 3. Seizures  No seizure activity since stopping seizure medications (about 2-2 1/2 weeks). Continue to follow with neurology. Patient does not wish to pursue further testing such as her ordered MRI or EEG. 4. Palliative care encounter  Discussed symptom management related to chronic disease/condition. Provided emotional support and active listening. Patient understands and is agreeable to current plan. Due to acuity, symptomatology and high-risk medication management, I advised patient to Return in about 1 month (around 12/3/2021). Total Time 60 minutes     Total time includes reviewing labs, reviewing reviewing history, medications, and allergies, counseling patient, performing medically appropriate evaluation and examination, and documenting in the medical record.        JOHNATHON Linn - CNP    Collaborating physician: Dr. James Goins

## 2021-11-05 ENCOUNTER — OFFICE VISIT (OUTPATIENT)
Dept: CARDIOLOGY CLINIC | Age: 76
End: 2021-11-05
Payer: MEDICARE

## 2021-11-05 ENCOUNTER — TELEPHONE (OUTPATIENT)
Dept: PALLATIVE CARE | Age: 76
End: 2021-11-05

## 2021-11-05 VITALS
OXYGEN SATURATION: 98 % | DIASTOLIC BLOOD PRESSURE: 70 MMHG | SYSTOLIC BLOOD PRESSURE: 128 MMHG | HEART RATE: 96 BPM | RESPIRATION RATE: 18 BRPM

## 2021-11-05 DIAGNOSIS — N18.4 STAGE 4 CHRONIC KIDNEY DISEASE (HCC): ICD-10-CM

## 2021-11-05 DIAGNOSIS — I42.1 HOCM (HYPERTROPHIC OBSTRUCTIVE CARDIOMYOPATHY) (HCC): ICD-10-CM

## 2021-11-05 DIAGNOSIS — I10 ESSENTIAL HYPERTENSION: ICD-10-CM

## 2021-11-05 DIAGNOSIS — I25.10 CORONARY ARTERY DISEASE INVOLVING NATIVE CORONARY ARTERY OF NATIVE HEART WITHOUT ANGINA PECTORIS: ICD-10-CM

## 2021-11-05 DIAGNOSIS — I50.41 ACUTE COMBINED SYSTOLIC AND DIASTOLIC CHF, NYHA CLASS 1 (HCC): Primary | ICD-10-CM

## 2021-11-05 DIAGNOSIS — E78.2 MIXED HYPERLIPIDEMIA: ICD-10-CM

## 2021-11-05 PROCEDURE — 1123F ACP DISCUSS/DSCN MKR DOCD: CPT | Performed by: INTERNAL MEDICINE

## 2021-11-05 PROCEDURE — 4040F PNEUMOC VAC/ADMIN/RCVD: CPT | Performed by: INTERNAL MEDICINE

## 2021-11-05 PROCEDURE — G8427 DOCREV CUR MEDS BY ELIG CLIN: HCPCS | Performed by: INTERNAL MEDICINE

## 2021-11-05 PROCEDURE — 1090F PRES/ABSN URINE INCON ASSESS: CPT | Performed by: INTERNAL MEDICINE

## 2021-11-05 PROCEDURE — G8484 FLU IMMUNIZE NO ADMIN: HCPCS | Performed by: INTERNAL MEDICINE

## 2021-11-05 PROCEDURE — G8417 CALC BMI ABV UP PARAM F/U: HCPCS | Performed by: INTERNAL MEDICINE

## 2021-11-05 PROCEDURE — G8400 PT W/DXA NO RESULTS DOC: HCPCS | Performed by: INTERNAL MEDICINE

## 2021-11-05 PROCEDURE — 1036F TOBACCO NON-USER: CPT | Performed by: INTERNAL MEDICINE

## 2021-11-05 PROCEDURE — 99214 OFFICE O/P EST MOD 30 MIN: CPT | Performed by: INTERNAL MEDICINE

## 2021-11-05 RX ORDER — DIGOXIN 125 MCG
125 TABLET ORAL EVERY OTHER DAY
Qty: 45 TABLET | Refills: 3 | Status: SHIPPED | OUTPATIENT
Start: 2021-11-05 | End: 2022-08-12

## 2021-11-05 ASSESSMENT — ENCOUNTER SYMPTOMS
WHEEZING: 0
SHORTNESS OF BREATH: 0
EYES NEGATIVE: 1
STRIDOR: 0
RESPIRATORY NEGATIVE: 1
NAUSEA: 0
CHEST TIGHTNESS: 0
COUGH: 0
BLOOD IN STOOL: 0
GASTROINTESTINAL NEGATIVE: 1

## 2021-11-05 NOTE — PROGRESS NOTES
Subsequent Progress Note  Patient: Ila Cleaning  YOB: 1945  MRN: [id number]    Chief Complaint: htn HF SOB DM HOCM  Chief Complaint   Patient presents with    3 Month Follow-Up    Congestive Heart Failure    Cardiomyopathy    Coronary Artery Disease    Hypertension    Shortness of Breath     palliative care nurse states has had some labored breathing    Fatigue       CV Data:  3/2019 Echo EF 79% no KYLE   10/8/2019 Cath CX 20-30 LVEF 95%   10/2020 Echo EF 55 Moderate CLVH  10/5/2020 Cath LAD 50-60   3/21 Echo EF 50 Severe + MR RVSP 31     Subjective/HPI: she stopped Verapamil 3 days ago due to severe fatigue and weakness. Feels better now. No cp no sob no bleed. C/o nocturnal leg cramps    3/27/2020 Patient and/or health care decision maker is aware that that he may receive a bill for this telephone service, depending on his insurance coverage, and has provided verbal consent to proceed. This visit was completed via telephone. Time spent on the phone with patient 18 minutes. She was to f/u with Dr. Chino Swartz but was on my VV schedule today. No CP no SOB. Still has occasional night time leg cramps. She is walking and compliant with meds. No LE edema. 1/15/21 recent multiple hospitalization. She was intubated and in shock. Had TVP for Bradycardia but stabilized and did not require PPM.      3/11/21 TELEHEALTH EVALUATION -- Audio/Visual (During XXGSI-18 public health emergency)    No cp no sob no falls no bleed. Not walking much. Poor appetite.  /104. .. ran out of Verapamil and Clonidine. 3/18/21 Patient and/or health care decision maker is aware that that he/she may receive a bill for this telephone service, depending on his insurance coverage, and has provided verbal consent to proceed. This visit was completed via telephone. Total time 14 minutes. Had been doing well.  This AM she took extra full dose Torsemde 50 mg because she thought she was not uriniating enough.  called in due to her BP being 79/56. She is awake and alert sitting. No cp no sob. Just feels weak and tired     4/16/21 TELEHEALTH EVALUATION -- Audio/Visual (During OVTAR-78 public health emergency)    Recent resp failure intubated. No cp no sob no falls no bleed Bp stable. Her voice is hoarse from ET tube and swelling. She has no dysphagia    6/1/21 feels better. Recent hosp had intestinal bleed and taken off ASA>  She will have denat extraction o 6/26. She will stay off ASA for now. 8/6/21 legs weak. Walk little with lobo. No falls no bleed. Eeats well. Sleeps too much. Always Early AM BP elevated. 11/5/21 not very active having sob no falls no bleed. occ CP at night. occ dizzy    EKG:    Past Medical History:   Diagnosis Date    Anxiety     Asthma     dx 2019 / has smoked since age 14    CHF (congestive heart failure) (HCC)     Chronic back pain     Bilateral L5 S1 Radic on emg--surprisingly worse on the left than the right--pt's symptoms and her MRI show worse on the right    Chronic obstructive pulmonary disease with acute exacerbation ([address] 10/12/2019    Depression     ESBL E. coli carrier     Carrier.  Fibromyalgia     Gastrointestinal hemorrhage 2/24/2020    Hyperlipidemia     meds > 8 yrs    Hypertension     meds > 45 yrs    On home O2     2l per n/c at bedtime mostly,     Osteoarthritis     Seizures ([address]     Smoker 6/18/2013    Type II diabetes mellitus, uncontrolled ([address]     hx > 8 yrs    Unspecified sleep apnea        Past Surgical History:   Procedure Laterality Date    BACK SURGERY  2017    lumbar disc    CARDIAC CATHETERIZATION  11/03/2014    DR. MIRELES / no stents    COLONOSCOPY  08/29/2016    w/polypectomy     COLONOSCOPY N/A 09/29/2020    COLONOSCOPY WITH POLYPECTOMY performed by Jian Harris MD at [facility] N/A 10/07/2019    EUA HYSTEROSCOPY DILATATION AND CURETTAGE performed by Kelin Ruiz DO at [facility]. [address], COLON, DIAGNOSTIC      EYE SURGERY      [place] with IOL OU / [address] [place]  [address]    umbilical hernia repair    IR PORT PLACEMENT EQUAL OR GREATER THAN 5 YEARS  2021    IR PORT PLACEMENT EQUAL OR GREATER THAN 5 YEARS 2021 MLOZ SPECIAL PROCEDURE    IL ESOPHAGOGASTRODUODENOSCOPY TRANSORAL DIAGNOSTIC N/A 2017    EGD ESOPHAGOGASTRODUODENOSCOPY performed by Zuri Avina MD at [facility] N/A 2018    negative findings    IL REVISE MEDIAN N/CARPAL TUNNEL SURG Left 2017    LEFT  CARPAL TUNNEL RELEASE performed by Aleks Crews MD at [facility],2+T/B,KNYZL N/A 2018    TONSILLECTOMY      as child    TUNNELED VENOUS PORT PLACEMENT Right 2021    8 Fr [employer] Port ClearVUE by Dr. Maverick Zarate  2016    w/bx     UPPER GASTROINTESTINAL ENDOSCOPY N/A 2020    EGD possible biopsy performed by Caitlin Cadet MD at [address] [place] [address] N/A 2020    EGD PUSH ENTEROSCOPY performed by Johnnie Magaña MD at [facility]       Family History   Problem Relation Age of Onset    Heart Disease Father         cardiac bypass    Arthritis Father     Arthritis Mother     Other Mother          at age 80    Other Sister         [facility] health hx    No Known Problems Daughter     Stroke Son        Social History     Socioeconomic History    Marital status:      Spouse name: Alonzo Party Number of children: 2    Years of education: 15    Highest education level: High school graduate   Occupational History    Occupation: Retired-   Tobacco Use    Smoking status: Former Smoker     Packs/day: 1.00     Years: 59.00     Pack years: 59.00     Types: Cigarettes     Start date: 2017     Quit date: 10/1/2020     Years since quittin.0    Smokeless tobacco: Never Used   Vaping Use    Vaping Use: Never used   Substance and Sexual Activity    Alcohol use: Not Currently    Drug use: No    Sexual activity: Yes     Partners: Male   Other Topics Concern    None   Social History Narrative    Grew up in [place]     Lives With:  [id number] Spouse    Type of Home: House    Home Layout: Two level, Performs ADL's on one level    Home Access: Stairs to enter with rails    Entrance Stairs - Number of Steps: 4    Bathroom Shower/Tub: Tub/Shower unit, Doors    Bathroom Equipment: Shower chair, Grab bars in shower    Home Equipment: Rolling walker, Cane, Oxygen(uses her O2 very seldom)    ADL Assistance: Needs assistance    Homemaking Assistance: Needs assistance    Homemaking Responsibilities: No(spouse performs)    Ambulation Assistance: Independent    Transfer Assistance: Independent    Active : No    Occupation: Retired    Type of occupation: worked in [email or web address]: patient enjoys [address] [place] [address] Strain:     Difficulty of Paying Living Expenses:    Food Insecurity:     Worried About [address] in the Last Year:    [address] in the Last Year:    Transportation Needs:     Lack of Transportation (Medical):  Lack of Transportation (Non-Medical):    Physical Activity:     Days of Exercise per Week:     Minutes of Exercise per Session:    Stress:     Feeling of Stress :    Social Connections:     Frequency of Communication with Friends and Family:     Frequency of Social Gatherings with Friends and Family:     Attends Shinto Services:     Active Member of Clubs or Organizations:     Attends Club or Organization Meetings:     Marital Status:    Intimate Partner Violence:     Fear of Current or Ex-Partner:     Emotionally Abused:     Physically Abused:     Sexually Abused:         Allergies   Allergen Reactions    Ibuprofen Nausea Only    Metformin And Related      Diarrhea      Darvon [Propoxyphene Hcl] Nausea And Vomiting       Current Outpatient Medications   Medication Sig Dispense Refill    digoxin (LANOXIN) 125 MCG tablet Take 1 tablet by mouth every other day 45 tablet 3    doxycycline hyclate (VIBRAMYCIN) 100 MG capsule Take 1 capsule by mouth 2 times daily for 10 days 20 capsule 0    predniSONE (DELTASONE) 10 MG tablet Take 4 tabs x 3 days then 3 tabs x 3 days then 2 tabs x 3 days then 1 tab x 3 days.  30 tablet 0    Probiotic Acidophilus (FLORANEX) TABS Take 1 tablet by mouth 2 times daily 60 tablet 0    albuterol-ipratropium (COMBIVENT RESPIMAT)  MCG/ACT AERS inhaler Inhale 1 puff into the lungs every 6 hours as needed for Wheezing or Shortness of Breath 4 g 5    ipratropium-albuterol (DUONEB) 0.5-2.5 (3) MG/3ML SOLN nebulizer solution Inhale 3 mLs into the lungs 3 times daily 360 mL 0    Cranberry 500 MG CAPS Take 500 mg by mouth daily      insulin glargine (LANTUS) 100 UNIT/ML injection vial 60 units at bedtime 3 each 3    pantoprazole (PROTONIX) 40 MG tablet Take 1 tablet by mouth every morning (before breakfast) 90 tablet 3    Cholecalciferol (VITAMIN D3) 50 MCG (2000 UT) CAPS Take 2,000 Units by mouth Daily      ACCU-CHEK PHYLLIS PLUS strip Test 3x daily Dx E11.65 100 each 3    amLODIPine (NORVASC) 5 MG tablet Take 1 tablet by mouth daily 90 tablet 1    aspirin 81 MG EC tablet Take 1 tablet by mouth daily 30 tablet 3    PARoxetine (PAXIL) 40 MG tablet TAKE 1 TABLET BY MOUTH ONCE DAILY IN THE MORNING 90 tablet 1    lidocaine-prilocaine (EMLA) 2.5-2.5 % cream APPLY CREAM TOPICALLY TO PORT SITE ONE HOUR PRIOR TO APPOINTMENT AS NEEDED      Handicap Placard MISC by Does not apply route Good from 7/15/21 - 7/15/26 1 each 0    rosuvastatin (CRESTOR) 40 MG tablet TAKE 1 TABLET BY MOUTH ONCE DAILY IN THE EVENING 90 tablet 3    insulin lispro (HUMALOG) 100 UNIT/ML injection vial 18 units at each mels 1 vial 3    Accu-Chek Softclix Lancets MISC bid 100 each 3    Blood Glucose Monitoring Suppl (ACCU-CHEK PHYLLIS CONNECT) w/Device KIT 1 kit by Does not apply route daily 1 kit 0    Continuous Blood Gluc Sensor (FREESTYLE MURALI 14 DAY SENSOR) [facility] Use every 2 weeks 2 each 1    Continuous Blood Gluc  (FREESTYLE MURALI 14 DAY READER) [place] As directed 1 Device 0    divalproex (DEPAKOTE) 500 MG DR tablet Take 1 tablet by mouth 2 times daily (Patient not taking: Reported on 11/3/2021) 60 tablet 2    carvedilol (COREG) 25 MG tablet Take 1.5 tablets by mouth 2 times daily (with meals) 270 tablet 2     No current facility-administered medications for this visit. Review of Systems:   Review of Systems   Constitutional: Negative. Negative for diaphoresis and fatigue. HENT: Negative. Eyes: Negative. Respiratory: Negative. Negative for cough, chest tightness, shortness of breath, wheezing and stridor. Cardiovascular: Negative. Negative for chest pain, palpitations and leg swelling. Gastrointestinal: Negative. Negative for blood in stool and nausea. Genitourinary: Negative. Musculoskeletal: Negative. Skin: Negative. Neurological: Negative. Negative for dizziness, syncope, weakness and light-headedness. Hematological: Negative. Psychiatric/Behavioral: Negative. Physical Examination:    /70 (Site: Right Upper Arm, Position: Sitting, Cuff Size: Medium Adult)   Pulse 96   Resp 18   LMP  (LMP Unknown)   SpO2 98%    Physical Exam   Constitutional: She appears healthy. No distress. HENT:   Normal cephalic and Atraumatic   Eyes: Pupils are equal, round, and reactive to light. Neck: Thyroid normal. No JVD present. No neck adenopathy. No thyromegaly present. Cardiovascular: Normal rate, regular rhythm, normal heart sounds, intact distal pulses and normal pulses. Pulmonary/Chest: Effort normal and breath sounds normal. She has no wheezes. She has no rales. She exhibits no tenderness. Abdominal: Soft. Bowel sounds are normal. There is no abdominal tenderness. Musculoskeletal:         General: No tenderness or edema. Normal range of motion. Cervical back: Normal range of motion and neck supple. Neurological: She is alert and oriented to person, place, and time. Skin: Skin is warm. No cyanosis. Nails show no clubbing.        LABS:  CBC:   Lab Results   Component Value Date    WBC 7.3 10/13/2021    RBC 3.95 10/13/2021    HGB 11.3 10/13/2021    HCT 34.8 10/13/2021    MCV 88.1 10/13/2021    MCH 28.7 10/13/2021    MCHC 32.6 10/13/2021    RDW 14.8 10/13/2021     10/13/2021    MPV 8.8 08/01/2015     Lipids:  Lab Results   Component Value Date    CHOL 143 11/30/2020    CHOL 105 02/24/2020    CHOL 158 10/13/2019     Lab Results   Component Value Date    TRIG 64 11/30/2020    TRIG 67 02/24/2020    TRIG 118 10/13/2019     Lab Results   Component Value Date    HDL 61 (H) 11/30/2020    HDL 42 02/24/2020    HDL 61 (H) 10/13/2019     Lab Results   Component Value Date    LDLCALC 69 11/30/2020    LDLCALC 50 02/24/2020    LDLCALC 73 10/13/2019     No results found for: LABVLDL, VLDL  Lab Results   Component Value Date    CHOLHDLRATIO 3.4 09/11/2012     CMP:    Lab Results   Component Value Date     10/13/2021    K 5.0 10/13/2021    K 4.4 10/19/2020     10/13/2021    CO2 19 10/13/2021    BUN 41 10/13/2021    CREATININE 2.13 10/13/2021    GFRAA 27.2 10/13/2021    LABGLOM 22.5 10/13/2021    GLUCOSE 242 10/13/2021    PROT 6.7 10/13/2021    LABALBU 4.3 10/13/2021    CALCIUM 10.1 10/13/2021    BILITOT <0.2 10/13/2021    ALKPHOS 104 10/13/2021    AST 16 10/13/2021    ALT 15 10/13/2021     BMP:    Lab Results   Component Value Date     10/13/2021    K 5.0 10/13/2021    K 4.4 10/19/2020     10/13/2021    CO2 19 10/13/2021    BUN 41 10/13/2021    LABALBU 4.3 10/13/2021    CREATININE 2.13 10/13/2021    CALCIUM 10.1 10/13/2021    GFRAA 27.2 10/13/2021    LABGLOM 22.5 10/13/2021 GLUCOSE 242 10/13/2021     Magnesium:    Lab Results   Component Value Date    MG 1.7 04/01/2021     TSH:  Lab Results   Component Value Date    TSH 0.474 11/30/2020       Patient Active Problem List   Diagnosis    Hypertension    Hyperlipidemia    Fibromyalgia    Anxiety    Insomnia    DJD (degenerative joint disease), lumbar    Lumbosacral radiculopathy at S1    Dysphonia    CTS (carpal tunnel syndrome)    High risk medication use-Norco - 12/20/17 OARRS PM&R, 02/20/18 OARRS PM&R, 03/07/18 OARRS PM&R, Urine Drug screen negative 02/06/17 PM&R--MED CONTRACT 2/6/17    SOB (shortness of breath) on exertion    Chest pain    Memory deficit    Artificial lens present    Presbyopia    Astigmatism, regular    Cataract, nuclear sclerotic senile    IDDM (insulin dependent diabetes mellitus)    Regular astigmatism    Nuclear senile cataract    Neck pain    Lumbosacral radiculopathy at L5    Spinal stenosis of lumbar region with neurogenic claudication    Impaired mobility and activities of daily living    Non-compliant patient NO showed FU 1/10/17 Dr Jayashree Concepcion Insomnia secondary to chronic pain    Reactive depression    Diabetic asymmetric polyneuropathy (HCC)    Myalgia    Acute combined systolic and diastolic CHF, NYHA class 1 (HCC)    Uncontrolled type 2 diabetes mellitus with hyperglycemia (HCC)    Chronic obstructive pulmonary disease (HCC)    HOCM (hypertrophic obstructive cardiomyopathy) (HCC)    Anemia    AVM (arteriovenous malformation)    Acute on chronic kidney failure (HCC)    Dysarthria    Chronic fatigue    Encephalopathy acute    Adenomatous polyp of sigmoid colon    Adenomatous polyp of transverse colon    Sepsis (HCC)    Major depressive disorder in remission ([address]    Gastroesophageal reflux disease without esophagitis    Cardiopulmonary arrest ([address]    Gait abnormality    Late effects of CVA (cerebrovascular accident)    Acute respiratory failure with hypoxia [facility])    Palliative care encounter    Bradycardia    Moderate hypoxic-ischemic encephalopathy    Infection due to ESBL-producing Escherichia coli    Port-A-Cath in place    Stage 3 chronic kidney disease ([facility]    History of GI bleed    Chronic right-sided thoracic back pain    Seizures ([facility])       There are no discontinued medications. Modified Medications    No medications on file       Orders Placed This Encounter   Medications    digoxin (LANOXIN) 125 MCG tablet     Sig: Take 1 tablet by mouth every other day     Dispense:  45 tablet     Refill:  3       Assessment/Plan:    1. Acute combined systolic and diastolic CHF, NYHA class 1 ([facility])   compensated   - Basic Metabolic Panel; Future    2. Essential hypertension   not controlled in AM-- will give Amlodipine 5 mg at [facility]. 3. Mixed hyperlipidemia     4. HOCM (hypertrophic obstructive cardiomyopathy) ([facility])  Continue BB ACEI. And Dig.     5. Hypotension- stable now    6. Resp failure- stable. Need Therapy. Need to walk daily. Eat well. Take meds. 7. Intestinal Bleed- staty off ASA- if Hb stable will consider resume in future. Counseling:  Heart Healthy Lifestyle, Low Salt Diet, Take Precautions to Prevent Falls and Walk Daily    Return in about 3 months (around 2/5/2022).       Electronically signed by Andrea Treviño MD on 11/5/2021 at 1:53 PM

## 2021-11-05 NOTE — TELEPHONE ENCOUNTER
Discussed cardiology visit with patient's spouse. Dr. Tania Magdaleno does not feel her current symptoms are cardiac related and are more so due to sedentary lifestyle/limited activity.

## 2021-11-05 NOTE — TELEPHONE ENCOUNTER
Pt's  called and stated he needed to follow up with Noel Markham after the meeting they had.  Please call him back at [phone]

## 2021-11-15 ENCOUNTER — OFFICE VISIT (OUTPATIENT)
Dept: FAMILY MEDICINE CLINIC | Age: 76
End: 2021-11-15
Payer: MEDICARE

## 2021-11-15 VITALS
HEART RATE: 85 BPM | SYSTOLIC BLOOD PRESSURE: 122 MMHG | RESPIRATION RATE: 16 BRPM | DIASTOLIC BLOOD PRESSURE: 60 MMHG | TEMPERATURE: 97.6 F | OXYGEN SATURATION: 98 %

## 2021-11-15 DIAGNOSIS — J44.9 CHRONIC OBSTRUCTIVE PULMONARY DISEASE, UNSPECIFIED COPD TYPE (HCC): Primary | ICD-10-CM

## 2021-11-15 DIAGNOSIS — F33.42 MAJOR DEPRESSIVE DISORDER, RECURRENT, IN FULL REMISSION (HCC): ICD-10-CM

## 2021-11-15 DIAGNOSIS — R56.9 SEIZURES (HCC): ICD-10-CM

## 2021-11-15 DIAGNOSIS — D50.0 IRON DEFICIENCY ANEMIA DUE TO CHRONIC BLOOD LOSS: ICD-10-CM

## 2021-11-15 DIAGNOSIS — M48.062 SPINAL STENOSIS OF LUMBAR REGION WITH NEUROGENIC CLAUDICATION: ICD-10-CM

## 2021-11-15 DIAGNOSIS — Z23 NEED FOR SHINGLES VACCINE: ICD-10-CM

## 2021-11-15 DIAGNOSIS — Z16.12 ESBL (EXTENDED SPECTRUM BETA-LACTAMASE) PRODUCING BACTERIA INFECTION: ICD-10-CM

## 2021-11-15 DIAGNOSIS — N18.4 STAGE 4 CHRONIC KIDNEY DISEASE (HCC): ICD-10-CM

## 2021-11-15 DIAGNOSIS — I42.1 HOCM (HYPERTROPHIC OBSTRUCTIVE CARDIOMYOPATHY) (HCC): ICD-10-CM

## 2021-11-15 DIAGNOSIS — I10 ESSENTIAL HYPERTENSION: ICD-10-CM

## 2021-11-15 DIAGNOSIS — I50.42 CHRONIC COMBINED SYSTOLIC AND DIASTOLIC CHF (CONGESTIVE HEART FAILURE) (HCC): ICD-10-CM

## 2021-11-15 DIAGNOSIS — K92.2 GASTROINTESTINAL HEMORRHAGE, UNSPECIFIED GASTROINTESTINAL HEMORRHAGE TYPE: ICD-10-CM

## 2021-11-15 DIAGNOSIS — Z87.19 HISTORY OF GI BLEED: ICD-10-CM

## 2021-11-15 DIAGNOSIS — M54.17 LUMBOSACRAL RADICULOPATHY AT S1: ICD-10-CM

## 2021-11-15 DIAGNOSIS — E78.00 PURE HYPERCHOLESTEROLEMIA: ICD-10-CM

## 2021-11-15 DIAGNOSIS — A49.9 ESBL (EXTENDED SPECTRUM BETA-LACTAMASE) PRODUCING BACTERIA INFECTION: ICD-10-CM

## 2021-11-15 DIAGNOSIS — Q27.30 AVM (ARTERIOVENOUS MALFORMATION): ICD-10-CM

## 2021-11-15 PROCEDURE — G8484 FLU IMMUNIZE NO ADMIN: HCPCS | Performed by: FAMILY MEDICINE

## 2021-11-15 PROCEDURE — 99214 OFFICE O/P EST MOD 30 MIN: CPT | Performed by: FAMILY MEDICINE

## 2021-11-15 PROCEDURE — G8400 PT W/DXA NO RESULTS DOC: HCPCS | Performed by: FAMILY MEDICINE

## 2021-11-15 PROCEDURE — 3051F HG A1C>EQUAL 7.0%<8.0%: CPT | Performed by: FAMILY MEDICINE

## 2021-11-15 PROCEDURE — 4040F PNEUMOC VAC/ADMIN/RCVD: CPT | Performed by: FAMILY MEDICINE

## 2021-11-15 PROCEDURE — G8926 SPIRO NO PERF OR DOC: HCPCS | Performed by: FAMILY MEDICINE

## 2021-11-15 PROCEDURE — 1036F TOBACCO NON-USER: CPT | Performed by: FAMILY MEDICINE

## 2021-11-15 PROCEDURE — G8417 CALC BMI ABV UP PARAM F/U: HCPCS | Performed by: FAMILY MEDICINE

## 2021-11-15 PROCEDURE — 1123F ACP DISCUSS/DSCN MKR DOCD: CPT | Performed by: FAMILY MEDICINE

## 2021-11-15 PROCEDURE — 3023F SPIROM DOC REV: CPT | Performed by: FAMILY MEDICINE

## 2021-11-15 PROCEDURE — 1090F PRES/ABSN URINE INCON ASSESS: CPT | Performed by: FAMILY MEDICINE

## 2021-11-15 PROCEDURE — G8427 DOCREV CUR MEDS BY ELIG CLIN: HCPCS | Performed by: FAMILY MEDICINE

## 2021-11-15 RX ORDER — ZOSTER VACCINE RECOMBINANT, ADJUVANTED 50 MCG/0.5
0.5 KIT INTRAMUSCULAR SEE ADMIN INSTRUCTIONS
Qty: 0.5 ML | Refills: 1 | Status: SHIPPED | OUTPATIENT
Start: 2021-11-15 | End: 2022-03-09

## 2021-11-15 SDOH — ECONOMIC STABILITY: FOOD INSECURITY: WITHIN THE PAST 12 MONTHS, THE FOOD YOU BOUGHT JUST DIDN'T LAST AND YOU DIDN'T HAVE MONEY TO GET MORE.: NEVER TRUE

## 2021-11-15 SDOH — ECONOMIC STABILITY: FOOD INSECURITY: WITHIN THE PAST 12 MONTHS, YOU WORRIED THAT YOUR FOOD WOULD RUN OUT BEFORE YOU GOT MONEY TO BUY MORE.: NEVER TRUE

## 2021-11-15 ASSESSMENT — PATIENT HEALTH QUESTIONNAIRE - PHQ9
SUM OF ALL RESPONSES TO PHQ QUESTIONS 1-9: 2
SUM OF ALL RESPONSES TO PHQ9 QUESTIONS 1 & 2: 2
2. FEELING DOWN, DEPRESSED OR HOPELESS: 1
SUM OF ALL RESPONSES TO PHQ QUESTIONS 1-9: 2
SUM OF ALL RESPONSES TO PHQ QUESTIONS 1-9: 2
1. LITTLE INTEREST OR PLEASURE IN DOING THINGS: 1

## 2021-11-15 ASSESSMENT — SOCIAL DETERMINANTS OF HEALTH (SDOH): HOW HARD IS IT FOR YOU TO PAY FOR THE VERY BASICS LIKE FOOD, HOUSING, MEDICAL CARE, AND HEATING?: NOT HARD AT ALL

## 2021-11-15 NOTE — PROGRESS NOTES
Chief Complaint   Patient presents with   [facility] Immunizations     requesting rx for shingles vaccine        HPI: Afshan Ndiaye 67 y.o. female presenting for       Weight Gain   Progressed over several months   No swelling  Is not active     Vaginal bleeding and discomfort  Patient is coming with her daughter with a chief complaint of vaginal discomfort and bleeding. Denies any current bleeding. Denies any fevers, chills, nausea, vomiting or chest pain, shortness of breath, abdominal pain currently, change urination, change in stools. F/u   Stable at this time     Seizures   Was seeing a neurologist   MRI was ordered - patient is declining   patinet is taking the keppra       Anemia  Mix of iron deficiency anemia and history of GI bleeds. Please read the last hospital encounter    Afshan Ndiaye is a 67 y.o. female that was admitted and treated at [facility] for increasing lethargy and fatigue in the setting of chronic GI bleed. Patient has had multiple endoscopic evaluations in the past including a PillCam which showed some bleeding throughout the distal small intestine. She has been treated also has advancing CKD 3 which is likely contributing to her anemia at some point as well. She was recently admitted to [facility] with cardiac arrest and has been successfully discharged home. At this point in time she was transfused to a hemoglobin of 9.1 and she will be discharged with hematology follow-up. PICC line was placed as no one was able to place an IV in her while on admission this will be removed prior to discharge. She will follow up with hematology for hospital erythropoietin versus iron versus blood transfusions as needed. She also follows with nephrology due to her CKD. Of note the patient did have mild Trop elevation on arrival which is chronic and baseline and troponin's have been flat with no signs of acute coronary events.     Patient was seen by hematology. Marybel was told that it was too soon for patient to have any iron transfusion/blood transfusions since she just had one in the hospital. Patient was to have blood work done in may and then repeated 2 weeks afterwards. Family is concerned because  Patient has a history where her blood count drops every month due to the chronic bleed in her GI system and she needs to be admitted to the hospital.family would like to see if there is a way where her blood count can be checked monthly or where she can be evaluated more frequently through other measures. Per , patient was seen by palliative care last week. From their understanding, they would resume all care and will not need any help from the specialist.  Family wants all of the care teams involved. Patient denies any fevers, chills, nausea, vomiting, chest pain, change in her shortness of breath, change in her stools. Follow-up  Patient is doing much better. Patient has her blood checked routinely. And has been established with hematology. Patient also follows up with gastroenterology. Per family, since she has been with palliative care she has been able to relate any concerns with them. Follow  Family reports that her blood count had dropped when she was at her most recent appointment. Denies any symptoms or concerns at this time. But patient is worried that if it drops any further that she will end up in the hospital.  Patient does not see palliative medicine until 1 month from now. F/u   Stable at this time. Colonization of ESBL E. coli    Patient was recently treated for urinary tract infection by her primary care provider. At that time patient was given antibiotics and was told to have a repeat urinary culture for further evaluation. Patient also has chronic abdominal pain that was seen and had a CT scan in the hospital.  Patient is a follow-up with her hematologist in June.     Follow-up  Was evaluated by infectious disease. Stated that patient does not need any antibiotics or anything at this time as she is likely been colonized with the bacteria. Currently patient has no issues or concerns. Denies any fevers, chills, nausea, vomiting, chest pain, shortness of breath, abdominal pain, urination, changes in stools. Follow  Stable. Review of systems  Negative except for what is stated in HPI      Current Outpatient Medications   Medication Sig Dispense Refill    zoster recombinant adjuvanted vaccine (SHINGRIX) 50 MCG/0.5ML SUSR injection Inject 0.5 mLs into the muscle See Admin Instructions 1 dose now and repeat in 2-6 months 0.5 mL 1    digoxin (LANOXIN) 125 MCG tablet Take 1 tablet by mouth every other day 45 tablet 3    predniSONE (DELTASONE) 10 MG tablet Take 4 tabs x 3 days then 3 tabs x 3 days then 2 tabs x 3 days then 1 tab x 3 days.  30 tablet 0    Probiotic Acidophilus (FLORANEX) TABS Take 1 tablet by mouth 2 times daily 60 tablet 0    albuterol-ipratropium (COMBIVENT RESPIMAT)  MCG/ACT AERS inhaler Inhale 1 puff into the lungs every 6 hours as needed for Wheezing or Shortness of Breath 4 g 5    divalproex (DEPAKOTE) 500 MG DR tablet Take 1 tablet by mouth 2 times daily (Patient not taking: Reported on 11/3/2021) 60 tablet 2    ipratropium-albuterol (DUONEB) 0.5-2.5 (3) MG/3ML SOLN nebulizer solution Inhale 3 mLs into the lungs 3 times daily 360 mL 0    Cranberry 500 MG CAPS Take 500 mg by mouth daily      insulin glargine (LANTUS) 100 UNIT/ML injection vial 60 units at bedtime 3 each 3    pantoprazole (PROTONIX) 40 MG tablet Take 1 tablet by mouth every morning (before breakfast) 90 tablet 3    Cholecalciferol (VITAMIN D3) 50 MCG (2000 UT) CAPS Take 2,000 Units by mouth Daily      ACCU-CHEK PHYLLIS PLUS strip Test 3x daily Dx E11.65 100 each 3    amLODIPine (NORVASC) 5 MG tablet Take 1 tablet by mouth daily 90 tablet 1    aspirin 81 MG EC tablet Take 1 tablet by mouth daily 30 tablet 3    PARoxetine (PAXIL) 40 MG tablet TAKE 1 TABLET BY MOUTH ONCE DAILY IN THE MORNING 90 tablet 1    lidocaine-prilocaine (EMLA) 2.5-2.5 % cream APPLY CREAM TOPICALLY TO PORT SITE ONE HOUR PRIOR TO APPOINTMENT AS NEEDED      Handicap Placard MISC by Does not apply route Good from 7/15/21 - 7/15/26 1 each 0    rosuvastatin (CRESTOR) 40 MG tablet TAKE 1 TABLET BY MOUTH ONCE DAILY IN THE EVENING 90 tablet 3    carvedilol (COREG) 25 MG tablet Take 1.5 tablets by mouth 2 times daily (with meals) 270 tablet 2    insulin lispro (HUMALOG) 100 UNIT/ML injection vial 18 units at each mels 1 vial 3    Accu-Chek Softclix Lancets MISC bid 100 each 3    Blood Glucose Monitoring Suppl (ACCU-CHEK PHYLLIS CONNECT) w/Device KIT 1 kit by Does not apply route daily 1 kit 0    Continuous Blood Gluc Sensor (FREESTYLE MURALI 14 DAY SENSOR) MISC Use every 2 weeks 2 each 1    Continuous Blood Gluc  (FREESTYLE MURALI 14 DAY READER) [place] As directed 1 Device 0     No current facility-administered medications for this visit. Past Medical History:   Diagnosis Date    Anxiety     Asthma     dx 2019 / has smoked since age 14    CHF (congestive heart failure) (HCC)     Chronic back pain     Bilateral L5 S1 Radic on emg--surprisingly worse on the left than the right--pt's symptoms and her MRI show worse on the right    Chronic obstructive pulmonary disease with acute exacerbation ([address] 10/12/2019    Depression     ESBL E. coli carrier     Carrier.  Fibromyalgia     Gastrointestinal hemorrhage 2/24/2020    Hyperlipidemia     meds > 8 yrs    Hypertension     meds > 45 yrs    On home O2     2l per n/c at bedtime mostly,     Osteoarthritis     Seizures ([address]     Smoker 6/18/2013    Type II diabetes mellitus, uncontrolled ([address]     hx > 8 yrs    Unspecified sleep apnea         Past Surgical History:   Procedure Laterality Date    BACK SURGERY  2017    lumbar disc    CARDIAC CATHETERIZATION  11/03/2014    DR. MIRELES / no stents    COLONOSCOPY  2016    w/polypectomy     COLONOSCOPY N/A 2020    COLONOSCOPY WITH POLYPECTOMY performed by Naresh Khan MD at [facility] N/A 10/07/2019    EUA HYSTEROSCOPY DILATATION AND CURETTAGE performed by Deena Storm DO at [facility]. [address], COLON, DIAGNOSTIC      EYE SURGERY      [place] with IOL OU / [address]    umbilical hernia repair    IR PORT PLACEMENT EQUAL OR GREATER THAN 5 YEARS  2021    IR PORT PLACEMENT EQUAL OR GREATER THAN 5 YEARS 2021 MLOZ SPECIAL PROCEDURE    SC ESOPHAGOGASTRODUODENOSCOPY TRANSORAL DIAGNOSTIC N/A 2017    EGD ESOPHAGOGASTRODUODENOSCOPY performed [facility] Justa Espitia MD at [facility] N/A 2018    negative findings    SC REVISE MEDIAN N/CARPAL TUNNEL SURG Left 2017    LEFT  CARPAL TUNNEL RELEASE performed by Nadir Persaud MD at [facility] GVEW,9+W/O,BICTM N/A 2018    TONSILLECTOMY      as child    TUNNELED VENOUS PORT PLACEMENT Right 2021    8 [employer] Port ClearVUE by Dr. Dwayne Shipman  2016    w/bx     UPPER GASTROINTESTINAL ENDOSCOPY N/A 2020    EGD possible biopsy performed by Naresh Khan MD at [place] [address] N/A 2020    EGD PUSH ENTEROSCOPY performed by Skyler Mansfield MD at [facility]        Family History   Problem Relation Age of Onset    Heart Disease Father         cardiac bypass    Arthritis Father     Arthritis Mother     Other Mother          at age 80    Other Sister         [facility]    No Known Problems Daughter     Stroke Son         Social History     Socioeconomic History    Marital status:      Spouse name: Wilver Dhaliwal Number of children: 2    Years of education: 12    Highest education level: High school graduate   Occupational History    Occupation: Retired-   Tobacco Use    Smoking status: Former Smoker     Packs/day: 1.00     Years: 59.00     Pack years: 59.00     Types: Cigarettes     Start date: 2017     Quit date: 10/1/2020     Years since quittin.1    Smokeless tobacco: Never Used   Vaping Use    Vaping Use: Never used   Substance and Sexual Activity    Alcohol use: Not Currently    Drug use: No    Sexual activity: Yes     Partners: Male   Other Topics Concern    Not on file   Social History Narrative    Grew up in [place]     Lives With:  [id number] Spouse    Type of Home: House    Home Layout: Two level, Performs ADL's on one level    Home Access: Stairs to enter with rails    Entrance Stairs - Number of Steps: 4    Bathroom Shower/Tub: Tub/Shower unit, Doors    Bathroom Equipment: Shower chair, Grab bars in [facility]: Rolling walker, Cane, Oxygen(uses her O2 very seldom)    ADL Assistance: Needs assistance    Homemaking Assistance: Needs assistance    Homemaking Responsibilities: No(spouse performs)    Ambulation Assistance: Independent    Transfer Assistance: Independent    Active : No    Occupation: Retired    Type of occupation: worked in [employer]: patient enjoys [address] [facility] Strain: Low Risk     Difficulty of Paying Living Expenses: Not hard at [employer] Insecurity: No Food Insecurity    Worried About [facility] in the Last Year: Never true    [facility] in the Last Year: Never true   Transportation Needs:     Lack of Transportation (Medical): Not on file    Lack of Transportation (Non-Medical):  Not on file   Physical Activity:     Days of Exercise per Week: Not on file    Minutes of Exercise per Session: Not on file   Stress:     Feeling of Stress : Not on file   Social Connections:     Frequency of Communication with Friends and Family: Not on file  Frequency of Social Gatherings with Friends and Family: Not on file    Attends Gnosticist Services: Not on file    Active Member of Clubs or Organizations: Not on file    Attends Club or Organization Meetings: Not on file    Marital Status: Not on file   Intimate Partner Violence:     Fear of Current or Ex-Partner: Not on file    Emotionally Abused: Not on file    Physically Abused: Not on file    Sexually Abused: Not on file   Housing Stability:     Unable to Pay for Housing in the Last Year: Not on file    Number of [place] in the Last Year: Not on file    Unstable Housing in the Last Year: Not on file        /60   Pulse 85   Temp 97.6 °F (36.4 °C)   Resp 16   LMP  (LMP Unknown)   SpO2 98%        Physical Exam:    Physical Exam:  General Appearance: alert and oriented to person, place and time, well developed and well- nourished, in no acute distress, in wheelchair  Skin: warm and dry, no rash or erythema  Head: normocephalic and atraumatic  Eyes: pupils equal, round, and reactive to light, extraocular eye movements intact, conjunctivae normal  ENT: tympanic membrane, external ear and ear canal normal bilaterally, nose without deformity, nasal mucosa and turbinates normal without polyps  Neck: supple and non-tender without mass, no thyromegaly or thyroid nodules, no cervical lymphadenopathy  Pulmonary/Chest: Distant heard. Cardiovascular: normal rate, regular rhythm, normal S1 and S2, no murmurs, rubs, clicks, or gallops, distal pulses intact, no carotid bruits  Abdomen:  No tenderness to palpation. Bowel sounds are present. Extremities: no cyanosis, clubbing or edema  Musculoskeletal: normal range of motion, no joint swelling, deformity or tenderness  Neurologic: reflexes normal and symmetric, no cranial nerve deficit, gait, coordination and speech normal  Pelvic examination: scant discharge noted. No bleeding noted.    Labs   TSH   Date Value Ref Range Status   08/27/2020 0.777 10/13/2021     10/13/2021    CO2 19 (L) 10/13/2021    BUN 41 (H) 10/13/2021    CREATININE 2.13 (H) 10/13/2021    GLUCOSE 242 (H) 10/13/2021    CALCIUM 10.1 (H) 10/13/2021    PROT 6.7 10/13/2021    LABALBU 4.3 10/13/2021    BILITOT <0.2 10/13/2021    ALKPHOS 104 10/13/2021    AST 16 10/13/2021    ALT 15 10/13/2021    LABGLOM 22.5 (L) 10/13/2021    GFRAA 27.2 (L) 10/13/2021    GLOB 2.4 10/13/2021           8. AVM (arteriovenous malformation)      9. Major depressive disorder, recurrent, in full remission ([facility]      10. Lumbosacral radiculopathy at S1      11. Pure hypercholesterolemia  Lab Results   Component Value Date    CHOL 143 11/30/2020    CHOL 105 02/24/2020    CHOL 158 10/13/2019     Lab Results   Component Value Date    TRIG 64 11/30/2020    TRIG 67 02/24/2020    TRIG 118 10/13/2019     Lab Results   Component Value Date    HDL 61 (H) 11/30/2020    HDL 42 02/24/2020    HDL 61 (H) 10/13/2019     Lab Results   Component Value Date    LDLCALC 69 11/30/2020    LDLCALC 50 02/24/2020    [address] 10/13/2019     No results found for: LABVLDL, VLDL  Lab Results   Component Value Date    CHOLHDLRATIO 3.4 09/11/2012         12. Gastrointestinal hemorrhage, unspecified gastrointestinal hemorrhage type      13. Uncontrolled type 2 diabetes mellitus with complication, without long-term current use of insulin ([facility]      14. Essential hypertension  BP Readings from Last 20 Encounters:   11/15/21 122/60   11/05/21 128/70   11/03/21 96/63   10/13/21 116/68   10/04/21 (!) 146/69   09/02/21 126/72   08/19/21 129/67   08/12/21 114/68   08/06/21 113/60   07/19/21 (!) 94/52   06/17/21 126/61   06/01/21 112/64   05/26/21 103/79   05/25/21 112/60   05/14/21 (!) 112/56   05/12/21 122/60   05/06/21 121/70   04/30/21 (!) 172/82   04/02/21 (!) 154/64   01/15/21 124/70   ]    15. Seizures ([address]  Continue on Keppra  Patinet is declining MRI at this time. 16. Spinal stenosis of lumbar region with neurogenic claudication      17. History of GI bleed  Stable at this time. Please note, this report has been partially produced using speech recognition software  and may cause  and /or contain errors related to that system including grammar, punctuation and spelling as well as words and phrases that may seem inappropriate. If there are questions or concerns please feel free to contact me to clarify.

## 2021-12-01 ENCOUNTER — TELEPHONE (OUTPATIENT)
Dept: PALLATIVE CARE | Age: 76
End: 2021-12-01

## 2021-12-01 DIAGNOSIS — N30.00 ACUTE CYSTITIS WITHOUT HEMATURIA: Primary | ICD-10-CM

## 2021-12-01 DIAGNOSIS — R30.0 DYSURIA: ICD-10-CM

## 2021-12-01 NOTE — TELEPHONE ENCOUNTER
Pt's  called and would like to speak to [facility] about medications.

## 2021-12-02 DIAGNOSIS — R30.0 DYSURIA: ICD-10-CM

## 2021-12-02 DIAGNOSIS — N30.00 ACUTE CYSTITIS WITHOUT HEMATURIA: ICD-10-CM

## 2021-12-02 LAB
BACTERIA: ABNORMAL /HPF
BILIRUBIN URINE: NEGATIVE
BLOOD, URINE: ABNORMAL
CLARITY: ABNORMAL
COLOR: YELLOW
EPITHELIAL CELLS, UA: ABNORMAL /HPF (ref 0–5)
GLUCOSE URINE: >=1000 MG/DL
HYALINE CASTS: ABNORMAL /HPF (ref 0–5)
KETONES, URINE: ABNORMAL MG/DL
LEUKOCYTE ESTERASE, URINE: NEGATIVE
NITRITE, URINE: NEGATIVE
PH UA: 5 (ref 5–9)
PROTEIN UA: 100 MG/DL
RBC UA: ABNORMAL /HPF (ref 0–5)
SPECIFIC GRAVITY UA: 1.03 (ref 1–1.03)
URINE REFLEX TO CULTURE: YES
UROBILINOGEN, URINE: 0.2 E.U./DL
WBC UA: ABNORMAL /HPF (ref 0–5)
YEAST: PRESENT /HPF

## 2021-12-02 RX ORDER — CIPROFLOXACIN 500 MG/1
500 TABLET, FILM COATED ORAL DAILY
Qty: 5 TABLET | Refills: 0 | Status: SHIPPED | OUTPATIENT
Start: 2021-12-02 | End: 2021-12-07

## 2021-12-02 NOTE — TELEPHONE ENCOUNTER
Patient has had strong smelling urine and has also been more tired even than usual. Urine is normal color. No blood. Patient is having some burning. No other associated signs or symptoms. No new back pain. Will call in antibiotic, but patient to get UA C&S prior to starting antibiotic. Patient was recently on bactrim. Creat. Clearance is 25 mL/min. Per last UA culture in October, UTI sensitive to bactrim, macrobid, and cipro. Cipro is the best option at this point. Discussed safety issues and risk of tendonitis and spontaneous tendon rupture. Call with any adverse reactions and stop taking medication.

## 2021-12-03 ENCOUNTER — TELEPHONE (OUTPATIENT)
Dept: PALLATIVE CARE | Age: 76
End: 2021-12-03

## 2021-12-03 ENCOUNTER — TELEPHONE (OUTPATIENT)
Dept: ENDOCRINOLOGY | Age: 76
End: 2021-12-03

## 2021-12-03 DIAGNOSIS — B37.9 YEAST INFECTION: Primary | ICD-10-CM

## 2021-12-03 DIAGNOSIS — R31.9 HEMATURIA, UNSPECIFIED TYPE: Primary | ICD-10-CM

## 2021-12-03 RX ORDER — FLUCONAZOLE 150 MG/1
150 TABLET ORAL ONCE
Qty: 1 TABLET | Refills: 0 | Status: SHIPPED | OUTPATIENT
Start: 2021-12-03 | End: 2021-12-03

## 2021-12-03 NOTE — TELEPHONE ENCOUNTER
Patient spouse called states patient has a UTI and would like to know if you recommend anything for the patient please advise

## 2021-12-03 NOTE — PROGRESS NOTES
Yeast found in urine. Patient having external burning. Likely vaginal yeast infection. Will send in diflucan.

## 2021-12-05 LAB
ORGANISM: ABNORMAL
URINE CULTURE, ROUTINE: ABNORMAL

## 2021-12-06 NOTE — TELEPHONE ENCOUNTER
Please call and instruct the patient and  to go to urgent care clinic for evaluation and treatment of her UTI

## 2021-12-07 ENCOUNTER — OFFICE VISIT (OUTPATIENT)
Dept: PALLATIVE CARE | Age: 76
End: 2021-12-07
Payer: MEDICARE

## 2021-12-07 VITALS
SYSTOLIC BLOOD PRESSURE: 116 MMHG | HEART RATE: 79 BPM | OXYGEN SATURATION: 99 % | TEMPERATURE: 98.5 F | DIASTOLIC BLOOD PRESSURE: 76 MMHG

## 2021-12-07 DIAGNOSIS — N18.9 CHRONIC KIDNEY DISEASE, UNSPECIFIED CKD STAGE: ICD-10-CM

## 2021-12-07 DIAGNOSIS — Z95.828 PORT-A-CATH IN PLACE: ICD-10-CM

## 2021-12-07 DIAGNOSIS — B37.9 YEAST INFECTION: ICD-10-CM

## 2021-12-07 DIAGNOSIS — I50.42 CHRONIC COMBINED SYSTOLIC AND DIASTOLIC CHF (CONGESTIVE HEART FAILURE) (HCC): ICD-10-CM

## 2021-12-07 DIAGNOSIS — Z51.5 PALLIATIVE CARE ENCOUNTER: ICD-10-CM

## 2021-12-07 DIAGNOSIS — J44.9 CHRONIC OBSTRUCTIVE PULMONARY DISEASE, UNSPECIFIED COPD TYPE (HCC): Primary | ICD-10-CM

## 2021-12-07 DIAGNOSIS — E11.65 UNCONTROLLED TYPE 2 DIABETES MELLITUS WITH HYPERGLYCEMIA (HCC): ICD-10-CM

## 2021-12-07 DIAGNOSIS — N30.01 ACUTE CYSTITIS WITH HEMATURIA: ICD-10-CM

## 2021-12-07 DIAGNOSIS — R56.9 SEIZURES (HCC): ICD-10-CM

## 2021-12-07 PROBLEM — A41.9 SEPSIS (HCC): Status: RESOLVED | Noted: 2020-10-06 | Resolved: 2021-12-07

## 2021-12-07 PROBLEM — N17.9 ACUTE ON CHRONIC KIDNEY FAILURE (HCC): Status: RESOLVED | Noted: 2020-08-25 | Resolved: 2021-12-07

## 2021-12-07 PROCEDURE — 99350 HOME/RES VST EST HIGH MDM 60: CPT | Performed by: NURSE PRACTITIONER

## 2021-12-07 PROCEDURE — 1090F PRES/ABSN URINE INCON ASSESS: CPT | Performed by: NURSE PRACTITIONER

## 2021-12-07 PROCEDURE — G8417 CALC BMI ABV UP PARAM F/U: HCPCS | Performed by: NURSE PRACTITIONER

## 2021-12-07 PROCEDURE — 1036F TOBACCO NON-USER: CPT | Performed by: NURSE PRACTITIONER

## 2021-12-07 PROCEDURE — G8484 FLU IMMUNIZE NO ADMIN: HCPCS | Performed by: NURSE PRACTITIONER

## 2021-12-07 PROCEDURE — 3051F HG A1C>EQUAL 7.0%<8.0%: CPT | Performed by: NURSE PRACTITIONER

## 2021-12-07 PROCEDURE — 1123F ACP DISCUSS/DSCN MKR DOCD: CPT | Performed by: NURSE PRACTITIONER

## 2021-12-07 PROCEDURE — 4040F PNEUMOC VAC/ADMIN/RCVD: CPT | Performed by: NURSE PRACTITIONER

## 2021-12-07 ASSESSMENT — ENCOUNTER SYMPTOMS
WHEEZING: 0
ABDOMINAL DISTENTION: 0
CONSTIPATION: 0
COUGH: 1
VOICE CHANGE: 0
TROUBLE SWALLOWING: 0
NAUSEA: 0
BLOOD IN STOOL: 0
SHORTNESS OF BREATH: 1
DIARRHEA: 0
BACK PAIN: 0

## 2021-12-07 NOTE — PROGRESS NOTES
Subjective:      Patient Id: Vaughn Carlton was seen at  home in [facility], for palliative medicine follow-up. She was accompanied to the appointment by: spouse, Mariama Bettencourt  Chief Complaint   Patient presents with    Other     elevated blood glucose      HPI       Sam Boateng is a 67 y.o. female with a complex medical history that includes GI bleeding throughout the distal small intestine, anxiety, asthma, CHF, COPD, CKD depression, fibromyalgia, OA, cardiac arrest, seizures, DM II, and sleep apnea. Home visit is necessary in lieu of office due to significant frailty and high symptom burden from comorbid illnesses. Patient is alert and sitting up on side of couch. Patient has ongoing SOB with exertion at baseline. No cough. Occasional wheezing. Reports using breathing treatments BID-TID. Patient had recent labs drawn by hematology. Plan is for two iron infusions. Her last scheduled iron infusion is one month from next Friday. After this, she does not have to see hematology for 3 months and is wondering how her port will get flushed. Patient has an appointment Thursday with endocrine. Blood sugars running between 200-300. Her spouse states she is only taking her Humalog once a day because she only eats one large meal a day. She was not able to get a hold of endocrine or go to the ER as per our last telephone discussion. Patient reports 3 lb weight loss (has been trying to lose weight). Patient remains off all seizure meds and has not had any seizures. Patient reports that all UTI/yeast infection symptoms have cleared up. Cipro complete. No new flank pain or fevers. No red flags. No leg swelling/fluid retention. No chest pain. She has persistent fatigue. No pain. No chest pain. Spouse reports upcoming appointment with nephrology.       Past Medical History:   Diagnosis Date    Adenomatous polyp of sigmoid colon     Adenomatous polyp of transverse colon     Anxiety     Asthma     dx 2019 / has smoked since age 11    [facility]iopulmonary arrest ([facility] [address]     CHF (congestive heart failure) (HCC)     Chronic back pain     Bilateral L5 S1 Radic on emg--surprisingly worse on the left than the right--pt's symptoms and her MRI show worse on the right    Chronic obstructive pulmonary disease with acute exacerbation ([facility] [address] 10/12/2019    Depression     ESBL E. coli carrier     Carrier.  Fibromyalgia     Gastrointestinal hemorrhage 2/24/2020    Hyperlipidemia     meds > 8 yrs    Hypertension     meds > 45 yrs    Insomnia 12/4/2013    On home O2     2l per n/c at bedtime mostly,     Osteoarthritis     Seizures ([facility] [address] [facility]     Sepsis ([facility] [address] 10/6/2020    Smoker 6/18/2013    Type II diabetes mellitus, uncontrolled ([facility] [address] [facility]     hx > 8 yrs    Unspecified sleep apnea      Past Surgical History:   Procedure Laterality Date    BACK SURGERY  2017    lumbar disc    CARDIAC CATHETERIZATION  11/03/2014    DR. MIRELES / no stents    COLONOSCOPY  08/29/2016    w/polypectomy     COLONOSCOPY N/A 09/29/2020    COLONOSCOPY WITH POLYPECTOMY performed by Cielo Loaiza MD at [facility] N/A 10/07/2019    EUA HYSTEROSCOPY DILATATION AND CURETTAGE performed by Simba Phillips DO at [facility]. [address], COLON, DIAGNOSTIC      EYE SURGERY      [place] with IOL OU / [address] [place]  [id number]    umbilical hernia repair    IR PORT PLACEMENT EQUAL OR GREATER THAN 5 YEARS  5/14/2021    IR PORT PLACEMENT EQUAL OR GREATER THAN 5 YEARS 5/14/2021 [facility] SPECIAL PROCEDURE    OK ESOPHAGOGASTRODUODENOSCOPY TRANSORAL DIAGNOSTIC N/A 03/24/2017    EGD ESOPHAGOGASTRODUODENOSCOPY performed by Lashonda Edwards MD at [facility] [address] 02/08/2018    negative findings    OK REVISE MEDIAN N/CARPAL TUNNEL SURG Left 06/05/2017    LEFT  CARPAL TUNNEL RELEASE performed by Adrien Boateng MD [facility] N/A 02/08/2018    TONSILLECTOMY      as child    TUNNELED VENOUS PORT PLACEMENT Right 2021    8 Fr [employer] Port ClearVUE by Dr. Kaleb Pacheco  2016    w/bx     UPPER GASTROINTESTINAL ENDOSCOPY N/A 2020    EGD possible biopsy performed by James Orourke MD at [address] N/A 2020    EGD PUSH ENTEROSCOPY performed by Cordelia Howard MD at [facility] Marital status:      Spouse name: Michael Lynn Number of children: 2    Years of education: 12    Highest education level: High school graduate   Occupational History    Occupation: Retired-   Tobacco Use    Smoking status: Former Smoker     Packs/day: 1.00     Years: 59.00     Pack years: 59.00     Types: Cigarettes     Start date: 2017     Quit date: 10/1/2020     Years since quittin.1    Smokeless tobacco: Never Used   Vaping Use    Vaping Use: Never used   Substance and Sexual Activity    Alcohol use: Not Currently    Drug use: No    Sexual activity: Yes     Partners: Male   Other Topics Concern    Not on file   Social History Narrative    Grew up in [address] [place] [address] With:  Librado Spouse    Type of Home: House    Home Layout: Two level, Performs ADL's on one level    Home Access: Stairs to enter with rails    Entrance Stairs - Number of Steps: 4    Bathroom Shower/Tub: Tub/Shower unit, Doors    Bathroom Equipment: Shower chair, Grab bars in [place]: Rolling walker, Cane, Oxygen(uses her O2 very seldom)    ADL Assistance: Needs assistance    Homemaking Assistance: Needs assistance    Homemaking Responsibilities: No(spouse performs)    Ambulation Assistance: Independent    Transfer Assistance: Independent    Active : No    Occupation: Retired    Type of occupation: worked in [employer]: patient enjoys [address] Financial Resource Strain: Low Risk     Difficulty of Paying Living Expenses: Not hard at all   Food Insecurity: No Food Insecurity    Worried About Running Out of Food in the Last Year: Never true    Robyn of Food in the Last Year: Never true   Transportation Needs:     Lack of Transportation (Medical): Not on file    Lack of Transportation (Non-Medical):  Not on file   Physical Activity:     Days of Exercise per Week: Not on file    Minutes of Exercise per Session: Not on file   Stress:     Feeling of Stress : Not on file   Social Connections:     Frequency of Communication with Friends and Family: Not on file    Frequency of Social Gatherings with Friends and Family: Not on file    Attends Spiritism Services: Not on file    Active Member of [address] [place] [address] [employer] or Organizations: Not on file    Attends Club or Organization Meetings: Not on file    Marital Status: Not on file   Intimate Partner Violence:     Fear of Current or Ex-Partner: Not on file    Emotionally Abused: Not on file    Physically Abused: Not on file    Sexually Abused: Not on file   Housing Stability:     Unable to Pay for Housing in the Last Year: Not on file    Number of [place] in the Last Year: Not on file    Unstable Housing in the Last Year: Not on file     Family History   Problem Relation Age of Onset    Heart Disease Father         cardiac bypass    Arthritis Father     Arthritis Mother     Other Mother          at age 80    Other Sister         [facility]    No Known Problems Daughter     Stroke Son      Allergies   Allergen Reactions    Ibuprofen Nausea Only    Metformin And Related      Diarrhea      Darvon [Propoxyphene Hcl] Nausea And Vomiting     Current Outpatient Medications on File Prior to Visit   Medication Sig Dispense Refill    zoster recombinant adjuvanted vaccine (SHINGRIX) 50 MCG/0.5ML SUSR injection Inject 0.5 mLs into the muscle See Admin Instructions 1 dose now and repeat in 2-6 months 0.5 mL 1    digoxin (LANOXIN) 125 MCG tablet Take 1 tablet by mouth every other day 45 tablet 3    predniSONE (DELTASONE) 10 MG tablet Take 4 tabs x 3 days then 3 tabs x 3 days then 2 tabs x 3 days then 1 tab x 3 days.  30 tablet 0    albuterol-ipratropium (COMBIVENT RESPIMAT)  MCG/ACT AERS inhaler Inhale 1 puff into the lungs every 6 hours as needed for Wheezing or Shortness of Breath 4 g 5    divalproex (DEPAKOTE) 500 MG DR tablet Take 1 tablet by mouth 2 times daily (Patient not taking: Reported on 11/3/2021) 60 tablet 2    ipratropium-albuterol (DUONEB) 0.5-2.5 (3) MG/3ML SOLN nebulizer solution Inhale 3 mLs into the lungs 3 times daily 360 mL 0    Cranberry 500 MG CAPS Take 500 mg by mouth daily      insulin glargine (LANTUS) 100 UNIT/ML injection vial 60 units at bedtime 3 each 3    pantoprazole (PROTONIX) 40 MG tablet Take 1 tablet by mouth every morning (before breakfast) 90 tablet 3    Cholecalciferol (VITAMIN D3) 50 MCG (2000 UT) CAPS Take 2,000 Units by mouth Daily      ACCU-CHEK PHYLLIS PLUS strip Test 3x daily Dx E11.65 100 each 3    amLODIPine (NORVASC) 5 MG tablet Take 1 tablet by mouth daily 90 tablet 1    aspirin 81 MG EC tablet Take 1 tablet by mouth daily 30 tablet 3    PARoxetine (PAXIL) 40 MG tablet TAKE 1 TABLET BY MOUTH ONCE DAILY IN THE MORNING 90 tablet 1    lidocaine-prilocaine (EMLA) 2.5-2.5 % cream APPLY CREAM TOPICALLY TO PORT SITE ONE HOUR PRIOR TO APPOINTMENT AS NEEDED      Handicap Placard MISC by Does not apply route Good from 7/15/21 - 7/15/26 1 each 0    rosuvastatin (CRESTOR) 40 MG tablet TAKE 1 TABLET BY MOUTH ONCE DAILY IN THE EVENING 90 tablet 3    carvedilol (COREG) 25 MG tablet Take 1.5 tablets by mouth 2 times daily (with meals) 270 tablet 2    insulin lispro (HUMALOG) 100 UNIT/ML injection vial 18 units at each mels 1 vial 3    Accu-Chek Softclix Lancets MISC bid 100 each 3    Blood Glucose Monitoring Suppl (ACCU-CHEK PHYLLIS CONNECT) w/Device KIT 1 kit by Does not apply route daily 1 kit 0    Continuous Blood Gluc Sensor (FREESTYLE MURALI 14 DAY SENSOR) [facility] Use every 2 weeks 2 each 1    Continuous Blood Gluc  (FREESTYLE MURALI 14 DAY READER) [place] As directed 1 Device 0     No current facility-administered medications on file prior to visit. Review of Systems   Constitutional: Positive for fatigue. Negative for activity change, appetite change, fever and unexpected weight change. HENT: Negative for trouble swallowing and voice change. Respiratory: Positive for cough and shortness of breath (with exertion). Negative for wheezing. Cardiovascular: Negative for chest pain and leg swelling. Gastrointestinal: Negative for abdominal distention, blood in stool, constipation, diarrhea and nausea. Genitourinary: Negative. Musculoskeletal: Positive for gait problem. Negative for back pain. Skin: Negative. Neurological: Positive for weakness. Negative for dizziness, seizures and speech difficulty. Psychiatric/Behavioral: Negative for confusion, dysphoric mood and sleep disturbance. The patient is not nervous/anxious. Objective:   /76   Pulse 79   Temp 98.5 °F (36.9 °C)   LMP  (LMP Unknown)   SpO2 99%    Wt Readings from Last 3 Encounters:   09/02/21 156 lb 3.2 oz (70.9 kg)   08/06/21 152 lb (68.9 kg)   05/25/21 141 lb (64 kg)     Physical Exam  Constitutional:       General: She is not in acute distress. HENT:      Head: Normocephalic and atraumatic. Nose: No rhinorrhea. Eyes:      General: No scleral icterus. Right eye: No discharge. Left eye: No discharge. Extraocular Movements: Extraocular movements intact. Conjunctiva/sclera: Conjunctivae normal.   Cardiovascular:      Rate and Rhythm: Normal rate and regular rhythm. Heart sounds: Normal heart sounds. Pulmonary:      Effort: Pulmonary effort is normal.      Breath sounds: Normal breath sounds. No wheezing or rales. Abdominal:      General: Bowel sounds are normal. There is no distension. Palpations: Abdomen is soft. Tenderness: There is no abdominal tenderness. Musculoskeletal:      Cervical back: Normal range of motion and neck supple. Right lower leg: No edema. Left lower leg: No edema. Skin:     General: Skin is warm and dry. Neurological:      Mental Status: She is alert and oriented to person, place, and time. Mental status is at baseline. Psychiatric:         Mood and Affect: Mood normal.         Behavior: Behavior normal.         Assessment and Plan:      1. Chronic obstructive pulmonary disease, unspecified COPD type ([address]  Stable. Continue COPD directed therapies. Call for increased SOB, cough, sputum production, excessive fatigue, fever, chills, or myalgias. Activity as tolerated. Patient does not follow with pulmonary and does not want to at this point in time per her goals of care to reduce healthcare visits. 2. Chronic combined systolic and diastolic CHF  Stable. Monitor weight daily, call if you gain 3 lbs in one day or 5 lbs. in one week or for increased edema, sob, cough, orthopnea, or chest pain. Patient follows with cardiology. 3. Seizures  No seizures since prior to last visit. Patient remains off seizure medications. Continue to follow with neurology (Has appointment scheduled). Patient does not wish to pursue further testing such as her ordered MRI or EEG. 4. Uncontrolled type 2 DM with hyperglycemia  Please keep appointment with endocrine on Thursday. Discussed the importance of small frequent meals and taking her Humalog TID with these meals. Discussed that patient's blood glucose has been uncontrolled. Patient to ask for HgbA1c at endo appointment. Discussed what this test is for. Patient to also ask if an insulin pump is an option at upcoming appointment. 5. Acute cystitis with hematuria  6. Yeast infection  UTI symptoms have cleared post cipro.  Discussed patient's increased risk for these infections due to her elevated blood sugars. Recheck UA for hematuria as per scheduled date. If hematuria still present, will refer to urology. 7. Chronic kidney disease, unspecified CKD stage  Continue to follow with nephrology    8. Port-A-Cath in place  Will look into setting up patient's monthly IP flushes at [facility] infusion center. 9. Palliative care encounter  Discussed symptom management related to chronic disease/condition. Provided emotional support and active listening. Patient understands and is agreeable to current plan. Due to acuity, symptomatology and high-risk medication management, I advised patient to Return in about 1 month (around 1/7/2022). Total Time 60 minutes     Total time includes reviewing history, medications, and allergies, counseling patient, performing medically appropriate evaluation and examination, and documenting in the medical record.        JOHNATHON Cruz - CNP    Collaborating physician: Dr. Katerina Shelton

## 2021-12-08 PROBLEM — N18.9 CHRONIC KIDNEY DISEASE: Status: ACTIVE | Noted: 2021-08-19

## 2021-12-08 PROBLEM — N30.01 ACUTE CYSTITIS WITH HEMATURIA: Status: ACTIVE | Noted: 2020-10-06

## 2021-12-08 PROBLEM — B37.9 YEAST INFECTION: Status: ACTIVE | Noted: 2021-12-08

## 2021-12-09 ENCOUNTER — OFFICE VISIT (OUTPATIENT)
Dept: ENDOCRINOLOGY | Age: 76
End: 2021-12-09
Payer: MEDICARE

## 2021-12-09 VITALS
DIASTOLIC BLOOD PRESSURE: 78 MMHG | HEART RATE: 80 BPM | HEIGHT: 59 IN | OXYGEN SATURATION: 98 % | BODY MASS INDEX: 31.55 KG/M2 | SYSTOLIC BLOOD PRESSURE: 127 MMHG

## 2021-12-09 DIAGNOSIS — E11.65 UNCONTROLLED TYPE 2 DIABETES MELLITUS WITH HYPERGLYCEMIA (HCC): Primary | ICD-10-CM

## 2021-12-09 DIAGNOSIS — Z79.4 TYPE 2 DIABETES MELLITUS WITH OTHER SPECIFIED COMPLICATION, WITH LONG-TERM CURRENT USE OF INSULIN (HCC): ICD-10-CM

## 2021-12-09 DIAGNOSIS — E11.69 TYPE 2 DIABETES MELLITUS WITH OTHER SPECIFIED COMPLICATION, WITH LONG-TERM CURRENT USE OF INSULIN (HCC): ICD-10-CM

## 2021-12-09 LAB
CHP ED QC CHECK: NORMAL
GLUCOSE BLD-MCNC: 350 MG/DL
HBA1C MFR BLD: 11.4 %

## 2021-12-09 PROCEDURE — 83036 HEMOGLOBIN GLYCOSYLATED A1C: CPT | Performed by: PHYSICIAN ASSISTANT

## 2021-12-09 PROCEDURE — 4040F PNEUMOC VAC/ADMIN/RCVD: CPT | Performed by: PHYSICIAN ASSISTANT

## 2021-12-09 PROCEDURE — 1036F TOBACCO NON-USER: CPT | Performed by: PHYSICIAN ASSISTANT

## 2021-12-09 PROCEDURE — 99213 OFFICE O/P EST LOW 20 MIN: CPT | Performed by: PHYSICIAN ASSISTANT

## 2021-12-09 PROCEDURE — G8427 DOCREV CUR MEDS BY ELIG CLIN: HCPCS | Performed by: PHYSICIAN ASSISTANT

## 2021-12-09 PROCEDURE — G8484 FLU IMMUNIZE NO ADMIN: HCPCS | Performed by: PHYSICIAN ASSISTANT

## 2021-12-09 PROCEDURE — 82962 GLUCOSE BLOOD TEST: CPT | Performed by: PHYSICIAN ASSISTANT

## 2021-12-09 PROCEDURE — 1090F PRES/ABSN URINE INCON ASSESS: CPT | Performed by: PHYSICIAN ASSISTANT

## 2021-12-09 PROCEDURE — G8417 CALC BMI ABV UP PARAM F/U: HCPCS | Performed by: PHYSICIAN ASSISTANT

## 2021-12-09 PROCEDURE — G8400 PT W/DXA NO RESULTS DOC: HCPCS | Performed by: PHYSICIAN ASSISTANT

## 2021-12-09 PROCEDURE — 1123F ACP DISCUSS/DSCN MKR DOCD: CPT | Performed by: PHYSICIAN ASSISTANT

## 2021-12-09 RX ORDER — BLOOD-GLUCOSE TRANSMITTER
1 EACH MISCELLANEOUS CONTINUOUS
Qty: 1 EACH | Refills: 3 | Status: SHIPPED | OUTPATIENT
Start: 2021-12-09 | End: 2022-06-06 | Stop reason: ALTCHOICE

## 2021-12-09 RX ORDER — BLOOD-GLUCOSE SENSOR
1 EACH MISCELLANEOUS CONTINUOUS
Qty: 3 EACH | Refills: 11 | Status: SHIPPED | OUTPATIENT
Start: 2021-12-09 | End: 2022-06-06 | Stop reason: ALTCHOICE

## 2021-12-09 RX ORDER — BLOOD-GLUCOSE,RECEIVER,CONT
1 EACH MISCELLANEOUS
Qty: 1 EACH | Refills: 0 | Status: SHIPPED | OUTPATIENT
Start: 2021-12-09 | End: 2022-06-06 | Stop reason: ALTCHOICE

## 2021-12-09 RX ORDER — GLUCAGON INJECTION, SOLUTION 1 MG/.2ML
1 INJECTION, SOLUTION SUBCUTANEOUS
Qty: 2 EACH | Refills: 3 | Status: SHIPPED | OUTPATIENT
Start: 2021-12-09

## 2021-12-09 RX ORDER — INSULIN GLARGINE 100 [IU]/ML
INJECTION, SOLUTION SUBCUTANEOUS
Qty: 3 EACH | Refills: 3
Start: 2021-12-09 | End: 2022-04-25

## 2021-12-09 ASSESSMENT — ENCOUNTER SYMPTOMS
EYE REDNESS: 0
NAUSEA: 0
VOMITING: 0
RHINORRHEA: 0
COUGH: 0
SINUS PRESSURE: 0
EYE PAIN: 0
SHORTNESS OF BREATH: 0
SORE THROAT: 0
DIARRHEA: 0
WHEEZING: 0
ABDOMINAL PAIN: 0

## 2021-12-09 NOTE — PROGRESS NOTES
2021    Assessment:       Diagnosis Orders   1. Uncontrolled type 2 diabetes mellitus with hyperglycemia (HCC)  POCT Glucose    POCT glycosylated hemoglobin (Hb A1C)    Comprehensive Metabolic Panel    Hemoglobin A1C    Lipid Panel   2. Type 2 diabetes mellitus with other specified complication, with long-term current use of insulin (HCC)  insulin glargine (LANTUS) 100 UNIT/ML injection vial     AVG am glucose range 250-300  PLAN:     1. Increase Lantus inject 65 units at bedtime  2. Humalog 18 units before breakfast and lunch, Increase 24 units before dinner   3. Check blood glucose 4 times daily and document  4. Dexcom G6 ordered  5.   Follow up in 1 months        Orders Placed This Encounter   Procedures    Comprehensive Metabolic Panel     Standing Status:   Future     Standing Expiration Date:   2022    Hemoglobin A1C     Standing Status:   Future     Standing Expiration Date:   2022    Lipid Panel     Standing Status:   Future     Standing Expiration Date:   2022     Order Specific Question:   Is Patient Fasting?/# of Hours     Answer:   10-12    POCT Glucose    POCT glycosylated hemoglobin (Hb A1C)     Orders Placed This Encounter   Medications    glucose 4 g chewable tablet     Sig: Take 4 tablets by mouth as needed for Low blood sugar     Dispense:  60 tablet     Refill:  3    Glucagon (Faviola Marlene 2-PACK) 1 MG/0.2ML SOAJ     Sig: Inject 1 mg into the skin every 15 minutes as needed (glucose less tahtn 70 or if symptomatic)     Dispense:  2 each     Refill:  3    Continuous Blood Gluc Sensor (DEXCOM G6 SENSOR) MISC     Si Device by Does not apply route continuous     Dispense:  3 each     Refill:  11    Continuous Blood Gluc Transmit (DEXCOM G6 TRANSMITTER) MISC     Si Device by Does not apply route continuous     Dispense:  1 each     Refill:  3    Continuous Blood Gluc  (DEXCOM G6 ) [place]     Si Device by Does not apply route 4 times daily (before meals and nightly)     Dispense:  1 each     Refill:  0    insulin glargine (LANTUS) 100 UNIT/ML injection vial     Si units at bedtime     Dispense:  3 each     Refill:  3    insulin lispro (HUMALOG) 100 UNIT/ML injection vial     Si units injected three times daily before meals     Dispense:  20 mL     Refill:  3     Return in about 3 months (around 3/9/2022) for Diabetes. Subjective:     Chief Complaint   Patient presents with    Follow-Up from [id number]:    21 1418 21 1446   BP: (!) 100/58 127/78   Pulse: 80    SpO2: 98%    Height: 4' 11\" (1.499 m)      Wt Readings from Last 3 Encounters:   21 156 lb 3.2 oz (70.9 kg)   21 152 lb (68.9 kg)   21 141 lb (64 kg)     BP Readings from Last 3 Encounters:   21 127/78   21 116/76   11/15/21 122/60     Patient 48-year-old type II diabetic female with worsening glycemic control. We discussed CGM's and insulin pumps. I think she would be best served by rotating injection sites and transitioning to a Dexcom G6 for ongoing glucose monitoring. Prescription was sent into medical services company. We discussed mealtime insulin, timing of it, and gave her glucose logs to document. Diabetes  She presents for her follow-up diabetic visit. She has type 2 diabetes mellitus. The initial diagnosis of diabetes was made 30 years ago. Her disease course has been worsening. Pertinent negatives for hypoglycemia include no dizziness or headaches. Associated symptoms include polydipsia. Pertinent negatives for diabetes include no chest pain, no fatigue and no polyuria. Hypoglycemia complications include hospitalization, required assistance and required glucagon injection. Symptoms are stable. Diabetic complications include heart disease. Pertinent negatives for diabetic complications include no CVA. Risk factors for coronary artery disease include diabetes mellitus and dyslipidemia.  Current diabetic UTERUS N/A 10/07/2019    EUA HYSTEROSCOPY DILATATION AND CURETTAGE performed by Osvaldo Lopez DO at [facility]. [place] [address], COLON, DIAGNOSTIC      EYE SURGERY      [place] with IOL OU / [id number]    umbilical hernia repair    IR PORT PLACEMENT EQUAL OR GREATER THAN 5 YEARS  2021    IR PORT PLACEMENT EQUAL OR GREATER THAN 5 YEARS 2021 [facility]ED[facility]    MN ESOPHAGOGASTRODUODENOSCOPY TRANSORAL DIAGNOSTIC N/A 2017    EGD ESOPHAGOGASTRODUODENOSCOPY performed by Poly Meza MD at [place] [address] 2018    negative findings    MN REVISE MEDIAN N/CARPAL TUNNEL SURG Left 2017    LEFT  CARPAL TUNNEL RELEASE performed by Jozef Philip MD at [facility],0+X/C,BMNZP N/A 2018    TONSILLECTOMY      as child    TUNNELED VENOUS PORT PLACEMENT Right 2021    8 Fr [employer] Port ClearVUE by Dr. Dong Jimenez  2016    w/bx     UPPER GASTROINTESTINAL ENDOSCOPY N/A 2020    EGD possible biopsy performed by Miles Graff MD at [address] [place] [address] N/A 2020    EGD PUSH ENTEROSCOPY performed by Lokesh Joyce MD at [facility] Marital status:      Spouse name: Varsha Pradhan Number of children: 2    Years of education: 12    Highest education level: High school graduate   Occupational History    Occupation: Retired-   Tobacco Use    Smoking status: Former Smoker     Packs/day: 1.00     Years: 59.00     Pack years: 59.00     Types: Cigarettes     Start date: 2017     Quit date: 10/1/2020     Years since quittin.1    Smokeless tobacco: Never Used   Vaping Use    Vaping Use: Never used   Substance and Sexual Activity    Alcohol use: Not Currently    Drug use: No    Sexual activity: Yes     Partners: Male   Other Topics Concern    Not on file   Social History Narrative    Grew up in [place]     Lives With:  [id number] Spouse    Type of Home: House    Home Layout: Two level, Performs ADL's on one level    Home Access: Stairs to enter with rails    Entrance Stairs - Number of Steps: 4    Bathroom Shower/Tub: Tub/Shower unit, Doors    Bathroom Equipment: Shower chair, Grab bars in [facility]: Rolling walker, Cane, Oxygen(uses her O2 very seldom)    ADL Assistance: Needs assistance    Homemaking Assistance: Needs assistance    Homemaking Responsibilities: No(spouse performs)    Ambulation Assistance: Independent    Transfer Assistance: Independent    Active : No    Occupation: Retired    Type of occupation: worked in [employer]: patient enjoys [address] [facility] [place] Strain: Low Risk     Difficulty of Paying Living Expenses: Not hard at [employer] Insecurity: No Food Insecurity    Worried About [address] [facility] in the Last Year: Never true    [address] in the Last Year: Never true   Transportation Needs:     Lack of Transportation (Medical): Not on file    Lack of Transportation (Non-Medical):  Not on file   Physical Activity:     Days of Exercise per Week: Not on file    Minutes of Exercise per Session: Not on file   Stress:     Feeling of Stress : Not on file   Social Connections:     Frequency of Communication with Friends and Family: Not on file    Frequency of Social Gatherings with Friends and Family: Not on file    Attends Oriental orthodox Services: Not on file    Active Member of Clubs or Organizations: Not on file    Attends Club or Organization Meetings: Not on file    Marital Status: Not on file   Intimate Partner Violence:     Fear of Current or Ex-Partner: Not on file    Emotionally Abused: Not on file    Physically Abused: Not on file    Sexually Abused: Not on file   Housing Stability:     Unable to Pay for Housing in the Last Year: Not on file    Number of Places Lived in the Last Year: Not on file    Unstable Housing in the Last Year: Not on file     Family History   Problem Relation Age of Onset    Heart Disease Father         cardiac bypass    Arthritis Father     Arthritis Mother     Other Mother          at age 80    Other Sister         [facility]    No Known Problems Daughter     Stroke Son      Allergies   Allergen Reactions    Ibuprofen Nausea Only    Metformin And Related      Diarrhea      Darvon [Propoxyphene Hcl] Nausea And Vomiting       Current Outpatient Medications:     glucose 4 g chewable tablet, Take 4 tablets by mouth as needed for Low blood sugar, Disp: 60 tablet, Rfl: 3    Glucagon (Victory Angel 2-PACK) 1 MG/0.2ML SOAJ, Inject 1 mg into the skin every 15 minutes as needed (glucose less tahtn 79 or if symptomatic), Disp: 2 each, Rfl: 3    Continuous Blood Gluc Sensor (DEXCOM G6 SENSOR) MISC, 1 Device by Does not apply route continuous, Disp: 3 each, Rfl: 11    Continuous Blood Gluc Transmit (DEXCOM G6 TRANSMITTER) MISC, 1 Device by Does not apply route continuous, Disp: 1 each, Rfl: 3    Continuous Blood Gluc  (DEXCOM G6 ) [place], 1 Device by Does not apply route 4 times daily (before meals and nightly), Disp: 1 each, Rfl: 0    insulin glargine (LANTUS) 100 UNIT/ML injection vial, 70 units at bedtime, Disp: 3 each, Rfl: 3    insulin lispro (HUMALOG) 100 UNIT/ML injection vial, 24 units injected three times daily before meals, Disp: 20 mL, Rfl: 3    zoster recombinant adjuvanted vaccine (SHINGRIX) 50 MCG/0.5ML SUSR injection, Inject 0.5 mLs into the muscle See Admin Instructions 1 dose now and repeat in 2-6 months, Disp: 0.5 mL, Rfl: 1    digoxin (LANOXIN) 125 MCG tablet, Take 1 tablet by mouth every other day, Disp: 45 tablet, Rfl: 3    albuterol-ipratropium (COMBIVENT RESPIMAT)  MCG/ACT AERS inhaler, Inhale 1 puff into the lungs every 6 hours as needed for Wheezing or Shortness of Breath, Disp: 4 g, Rfl: 5    divalproex (DEPAKOTE) 500 MG DR tablet, Take 1 tablet by mouth 2 times daily (Patient not taking: Reported on 11/3/2021), Disp: 60 tablet, Rfl: 2    ipratropium-albuterol (DUONEB) 0.5-2.5 (3) MG/3ML SOLN nebulizer solution, Inhale 3 mLs into the lungs 3 times daily, Disp: 360 mL, Rfl: 0    Cranberry 500 MG CAPS, Take 500 mg by mouth daily, Disp: , Rfl:     pantoprazole (PROTONIX) 40 MG tablet, Take 1 tablet by mouth every morning (before breakfast), Disp: 90 tablet, Rfl: 3    Cholecalciferol (VITAMIN D3) 50 MCG (2000 UT) CAPS, Take 2,000 Units by mouth Daily, Disp: , Rfl:     ACCU-CHEK PHYLLIS PLUS strip, Test 3x daily Dx E11.65, Disp: 100 each, Rfl: 3    amLODIPine (NORVASC) 5 MG tablet, Take 1 tablet by mouth daily, Disp: 90 tablet, Rfl: 1    aspirin 81 MG EC tablet, Take 1 tablet by mouth daily, Disp: 30 tablet, Rfl: 3    PARoxetine (PAXIL) 40 MG tablet, TAKE 1 TABLET BY MOUTH ONCE DAILY IN THE MORNING, Disp: 90 tablet, Rfl: 1    lidocaine-prilocaine (EMLA) 2.5-2.5 % cream, APPLY CREAM TOPICALLY TO PORT SITE ONE HOUR PRIOR TO APPOINTMENT AS NEEDED, Disp: , Rfl:     Handicap Placard MISC, by Does not apply route Good from 7/15/21 - 7/15/26, Disp: 1 each, Rfl: 0    rosuvastatin (CRESTOR) 40 MG tablet, TAKE 1 TABLET BY MOUTH ONCE DAILY IN THE EVENING, Disp: 90 tablet, Rfl: 3    carvedilol (COREG) 25 MG tablet, Take 1.5 tablets by mouth 2 times daily (with meals), Disp: 270 tablet, Rfl: 2    Accu-Chek Softclix Lancets MISC, bid, Disp: 100 each, Rfl: 3    Blood Glucose Monitoring Suppl (ACCU-CHEK PHYLLIS CONNECT) w/Device KIT, 1 kit by Does not apply route daily, Disp: 1 kit, Rfl: 0  Lab Results   Component Value Date     10/13/2021    K 5.0 (H) 10/13/2021     10/13/2021    CO2 19 (L) 10/13/2021    BUN 41 (H) 10/13/2021    CREATININE 2.13 (H) 10/13/2021    GLUCOSE 350 12/09/2021    CALCIUM 10.1 (H) 10/13/2021    PROT 6.7 10/13/2021    LABALBU 4.3 10/13/2021    BILITOT <0.2 10/13/2021    ALKPHOS 104 10/13/2021    AST 16 10/13/2021    ALT 15 10/13/2021    LABGLOM 22.5 (L) 10/13/2021    GFRAA 27.2 (L) 10/13/2021    GLOB 2.4 10/13/2021     Lab Results   Component Value Date    WBC 7.3 10/13/2021    HGB 11.3 (L) 10/13/2021    HCT 34.8 (L) 10/13/2021    MCV 88.1 10/13/2021     10/13/2021     Lab Results   Component Value Date    LABA1C 11.4 12/09/2021    LABA1C 7.5 (H) 04/29/2021    LABA1C 9.6 (H) 03/19/2021     Lab Results   Component Value Date    HDL 61 (H) 11/30/2020    HDL 42 02/24/2020    HDL 61 (H) 10/13/2019    LDLCALC 69 11/30/2020    LDLCALC 50 02/24/2020    LDLCALC 73 10/13/2019    CHOL 143 11/30/2020    CHOL 105 02/24/2020    CHOL 158 10/13/2019    TRIG 64 11/30/2020    TRIG 67 02/24/2020    TRIG 118 10/13/2019     No results found for: TESTM  Lab Results   Component Value Date    TSH 0.474 11/30/2020    TSH 1.070 10/05/2020    TSH 1.200 10/13/2019    TSHREFLEX 0.777 08/27/2020     No results found for: TPOABS    Review of Systems   Constitutional: Negative for chills, fatigue and fever. HENT: Negative for congestion, ear pain, postnasal drip, rhinorrhea, sinus pressure and sore throat. Eyes: Negative for pain and redness. Respiratory: Negative for cough, shortness of breath and wheezing. Cardiovascular: Negative for chest pain, palpitations and leg swelling. Gastrointestinal: Negative for abdominal pain, diarrhea, nausea and vomiting. Endocrine: Positive for polydipsia. Negative for polyuria. Genitourinary: Negative for difficulty urinating. Musculoskeletal: Negative for arthralgias. Skin: Negative for rash. Allergic/Immunologic: Negative for environmental allergies. Neurological: Negative for dizziness and headaches. Hematological: Negative for adenopathy. Psychiatric/Behavioral: Negative for dysphoric mood. Objective:   Physical Exam  Vitals reviewed.    Constitutional:       Appearance: She is well-developed. HENT:      Head: Normocephalic and atraumatic. Nose: No rhinorrhea. Eyes:      Conjunctiva/sclera: Conjunctivae normal.   Cardiovascular:      Rate and Rhythm: Normal rate and regular rhythm. Heart sounds: Normal heart sounds. Pulmonary:      Effort: Pulmonary effort is normal.      Breath sounds: Normal breath sounds. Abdominal:      General: Bowel sounds are normal.      Palpations: Abdomen is soft. Musculoskeletal:         General: Normal range of motion. Cervical back: Normal range of motion and neck supple. Skin:     General: Skin is warm and dry. Neurological:      Mental Status: She is alert and oriented to person, place, and time.    Psychiatric:         Mood and Affect: Mood normal.

## 2021-12-10 RX ORDER — SODIUM CHLORIDE 0.9 % (FLUSH) 0.9 %
5-40 SYRINGE (ML) INJECTION PRN
Status: CANCELLED | OUTPATIENT
Start: 2022-01-07

## 2021-12-10 RX ORDER — HEPARIN SODIUM (PORCINE) LOCK FLUSH IV SOLN 100 UNIT/ML 100 UNIT/ML
500 SOLUTION INTRAVENOUS PRN
Status: CANCELLED | OUTPATIENT
Start: 2022-01-07

## 2021-12-10 NOTE — PROGRESS NOTES
Called patient. Provided number to spouse to schedule monthly port flushes at OP infusion. Therapy plan in and signed.

## 2021-12-27 NOTE — TELEPHONE ENCOUNTER
patient requesting medication refill.  Please approve or deny this request.    Rx requested:  Requested Prescriptions     Pending Prescriptions Disp Refills    ACCU-CHEK PHYLLIS PLUS strip 100 each 3     Sig: Test 3x daily Dx E11.65         Last Office Visit:   12/9/2021      Next Visit Date:  Future Appointments   Date Time Provider Zaid Lr   1/6/2022  2:20 PM Haider Madrigal, [address]   1/14/2022 12:00 PM [facility] [address] [facility]   1/14/2022  2:00 PM JOHNATHON Goodman - CNP [facility]   1/17/2022 11:30 AM Naomi Mccarthy MD [facility]

## 2021-12-29 RX ORDER — BLOOD SUGAR DIAGNOSTIC
STRIP MISCELLANEOUS
Qty: 100 EACH | Refills: 3 | Status: SHIPPED | OUTPATIENT
Start: 2021-12-29 | End: 2022-01-20 | Stop reason: SDUPTHER

## 2022-01-14 ENCOUNTER — HOSPITAL ENCOUNTER (OUTPATIENT)
Dept: INFUSION THERAPY | Age: 77
Setting detail: INFUSION SERIES
Discharge: HOME | End: 2022-01-14
Payer: MEDICARE

## 2022-01-14 ENCOUNTER — OFFICE VISIT (OUTPATIENT)
Dept: PALLATIVE CARE | Age: 77
End: 2022-01-14
Payer: MEDICARE

## 2022-01-14 VITALS
HEART RATE: 78 BPM | BODY MASS INDEX: 29.49 KG/M2 | SYSTOLIC BLOOD PRESSURE: 125 MMHG | TEMPERATURE: 98.4 F | WEIGHT: 146 LBS | OXYGEN SATURATION: 97 % | DIASTOLIC BLOOD PRESSURE: 51 MMHG

## 2022-01-14 VITALS
RESPIRATION RATE: 18 BRPM | SYSTOLIC BLOOD PRESSURE: 126 MMHG | HEART RATE: 74 BPM | DIASTOLIC BLOOD PRESSURE: 57 MMHG | TEMPERATURE: 98 F

## 2022-01-14 DIAGNOSIS — R56.9 SEIZURES (HCC): ICD-10-CM

## 2022-01-14 DIAGNOSIS — N39.0 CHRONIC UTI: ICD-10-CM

## 2022-01-14 DIAGNOSIS — R31.29 OTHER MICROSCOPIC HEMATURIA: ICD-10-CM

## 2022-01-14 DIAGNOSIS — Z95.828 PORT-A-CATH IN PLACE: Primary | ICD-10-CM

## 2022-01-14 DIAGNOSIS — J44.9 CHRONIC OBSTRUCTIVE PULMONARY DISEASE, UNSPECIFIED COPD TYPE (HCC): ICD-10-CM

## 2022-01-14 DIAGNOSIS — E11.65 UNCONTROLLED TYPE 2 DIABETES MELLITUS WITH HYPERGLYCEMIA (HCC): ICD-10-CM

## 2022-01-14 DIAGNOSIS — Z95.828 PORT-A-CATH IN PLACE: ICD-10-CM

## 2022-01-14 DIAGNOSIS — R53.83 OTHER FATIGUE: Primary | ICD-10-CM

## 2022-01-14 DIAGNOSIS — I50.42 CHRONIC COMBINED SYSTOLIC AND DIASTOLIC CHF (CONGESTIVE HEART FAILURE) (HCC): ICD-10-CM

## 2022-01-14 DIAGNOSIS — F32.A DEPRESSION, UNSPECIFIED DEPRESSION TYPE: ICD-10-CM

## 2022-01-14 DIAGNOSIS — Z51.5 PALLIATIVE CARE ENCOUNTER: ICD-10-CM

## 2022-01-14 PROBLEM — D63.1 ANEMIA OF CHRONIC RENAL FAILURE: Status: ACTIVE | Noted: 2020-08-25

## 2022-01-14 PROBLEM — N18.4 CHRONIC KIDNEY DISEASE, STAGE 4 (SEVERE) (HCC): Status: ACTIVE | Noted: 2022-01-14

## 2022-01-14 PROBLEM — E11.21 DIABETIC RENAL DISEASE (HCC): Status: ACTIVE | Noted: 2022-01-14

## 2022-01-14 PROBLEM — E87.70 HYPERVOLEMIA: Status: ACTIVE | Noted: 2022-01-14

## 2022-01-14 PROBLEM — I12.9 CHRONIC KIDNEY DISEASE DUE TO HYPERTENSION: Status: ACTIVE | Noted: 2021-08-19

## 2022-01-14 PROBLEM — N18.32 CHRONIC KIDNEY DISEASE, STAGE 3B (HCC): Status: ACTIVE | Noted: 2022-01-14

## 2022-01-14 LAB
ALBUMIN SERPL-MCNC: 3.9 G/DL (ref 3.5–4.6)
ANION GAP SERPL CALCULATED.3IONS-SCNC: 12 MEQ/L (ref 9–15)
BUN BLDV-MCNC: 17 MG/DL (ref 8–23)
CALCIUM SERPL-MCNC: 10.3 MG/DL (ref 8.5–9.9)
CHLORIDE BLD-SCNC: 104 MEQ/L (ref 95–107)
CO2: 22 MEQ/L (ref 20–31)
CREAT SERPL-MCNC: 2.18 MG/DL (ref 0.5–0.9)
GFR AFRICAN AMERICAN: 26.4
GFR NON-AFRICAN AMERICAN: 21.8
GLUCOSE BLD-MCNC: 215 MG/DL (ref 70–99)
PHOSPHORUS: 2.7 MG/DL (ref 2.3–4.8)
POTASSIUM SERPL-SCNC: 4 MEQ/L (ref 3.4–4.9)
SODIUM BLD-SCNC: 138 MEQ/L (ref 135–144)

## 2022-01-14 PROCEDURE — 99349 HOME/RES VST EST MOD MDM 40: CPT | Performed by: NURSE PRACTITIONER

## 2022-01-14 PROCEDURE — G8417 CALC BMI ABV UP PARAM F/U: HCPCS | Performed by: NURSE PRACTITIONER

## 2022-01-14 PROCEDURE — 1036F TOBACCO NON-USER: CPT | Performed by: NURSE PRACTITIONER

## 2022-01-14 PROCEDURE — 36591 DRAW BLOOD OFF VENOUS DEVICE: CPT

## 2022-01-14 PROCEDURE — G8484 FLU IMMUNIZE NO ADMIN: HCPCS | Performed by: NURSE PRACTITIONER

## 2022-01-14 PROCEDURE — 4040F PNEUMOC VAC/ADMIN/RCVD: CPT | Performed by: NURSE PRACTITIONER

## 2022-01-14 PROCEDURE — 1090F PRES/ABSN URINE INCON ASSESS: CPT | Performed by: NURSE PRACTITIONER

## 2022-01-14 PROCEDURE — 1123F ACP DISCUSS/DSCN MKR DOCD: CPT | Performed by: NURSE PRACTITIONER

## 2022-01-14 PROCEDURE — 6360000002 HC RX W HCPCS: Performed by: NURSE PRACTITIONER

## 2022-01-14 PROCEDURE — 2580000003 HC RX 258: Performed by: NURSE PRACTITIONER

## 2022-01-14 RX ORDER — HEPARIN SODIUM (PORCINE) LOCK FLUSH IV SOLN 100 UNIT/ML 100 UNIT/ML
500 SOLUTION INTRAVENOUS PRN
Status: CANCELLED | OUTPATIENT
Start: 2022-02-11

## 2022-01-14 RX ORDER — SODIUM CHLORIDE 0.9 % (FLUSH) 0.9 %
5-40 SYRINGE (ML) INJECTION PRN
Status: DISCONTINUED | OUTPATIENT
Start: 2022-01-14 | End: 2022-01-15 | Stop reason: HOSPADM

## 2022-01-14 RX ORDER — SODIUM CHLORIDE 0.9 % (FLUSH) 0.9 %
5-40 SYRINGE (ML) INJECTION PRN
Status: CANCELLED | OUTPATIENT
Start: 2022-02-11

## 2022-01-14 RX ORDER — HEPARIN SODIUM (PORCINE) LOCK FLUSH IV SOLN 100 UNIT/ML 100 UNIT/ML
500 SOLUTION INTRAVENOUS PRN
Status: DISCONTINUED | OUTPATIENT
Start: 2022-01-14 | End: 2022-01-15 | Stop reason: HOSPADM

## 2022-01-14 RX ADMIN — HEPARIN SODIUM (PORCINE) LOCK FLUSH IV SOLN 100 UNIT/ML 500 UNITS: 100 SOLUTION at 12:13

## 2022-01-14 RX ADMIN — SODIUM CHLORIDE, PRESERVATIVE FREE 10 ML: 5 INJECTION INTRAVENOUS at 12:13

## 2022-01-14 ASSESSMENT — ENCOUNTER SYMPTOMS
ABDOMINAL DISTENTION: 0
WHEEZING: 0
SHORTNESS OF BREATH: 1
DIARRHEA: 0
VOICE CHANGE: 0
BLOOD IN STOOL: 0
NAUSEA: 0
BACK PAIN: 0
CONSTIPATION: 0
TROUBLE SWALLOWING: 0

## 2022-01-14 NOTE — PROGRESS NOTES
Subjective:      Patient Id: Felipa Jimenez was seen at  home in [facility] ([place], [place]) palliative medicine follow-up. She was accompanied to the appointment by: spouse, Kusum Rodrigues. Chief Complaint   Patient presents with    Fatigue    Other     Impaired ADLs      HPI       Andrey Loo is a 67 y.o. female with a complex medical history that includes GI bleeding throughout the distal small intestine, anxiety, asthma, CHF, COPD, CKD depression, fibromyalgia, OA, cardiac arrest, seizures, DM II, and sleep apnea. Home visit is necessary in lieu of office due to significant frailty and high symptom burden from comorbid illnesses. Patient is alert and sitting up on side of couch. Patient has ongoing SOB with exertion at baseline. No cough. Occasional wheezing. Reports using breathing treatments as needed. She hasn't used them at all this week. Patient was able to get port flushed and has been scheduled for her next visit in OP infusion. Reports having had appointment with endocrine, and patient and spouse feel like they learned a lot of good information regarding patient's diabetes management. They did test hgbA1c. Had appointment on the 6th but wasn't able to make this. Next appointment is for January 20th. Blood sugars running between 200-300. Patient has been taking her insulin 3 times a day as ordered. Since she only likes to eat one meal per day, she has been supplementing with ensure for her other two meals. Patient remains off all seizure meds and has not had any seizures. Patient had UA done today. She continues to have blood in the urine per UA. Patient denies any current UTI symptoms. She is likely colonized. Hx. Of ESBL e.coli in urine. Cancer will also need to be r/o'd due to ongoing hematuria. No leg swelling/fluid retention. No chest pain. She has persistent fatigue. No pain.      Spouse reports patient gets tearful at times due to not being able to do the things she used to such as clean her house. Past Medical History:   Diagnosis Date    Adenomatous polyp of sigmoid colon     Adenomatous polyp of transverse colon     Anxiety     Asthma     dx 2019 / has smoked since age 11    Cardiopulmonary arrest ([address]     CHF (congestive heart failure) (HCC)     Chronic back pain     Bilateral L5 S1 Radic on emg--surprisingly worse on the left than the right--pt's symptoms and her MRI show worse on the right    Chronic obstructive pulmonary disease with acute exacerbation ([address] 10/12/2019    Depression     ESBL E. coli carrier     Carrier.  Fibromyalgia     Gastrointestinal hemorrhage 2/24/2020    Hyperlipidemia     meds > 8 yrs    Hypertension     meds > 45 yrs    Insomnia 12/4/2013    On home O2     2l per n/c at bedtime mostly,     Osteoarthritis     Seizures ([address]     Sepsis ([address] 10/6/2020    Smoker 6/18/2013    Type II diabetes mellitus, uncontrolled ([address]     hx > 8 yrs    Unspecified sleep apnea      Past Surgical History:   Procedure Laterality Date    BACK SURGERY  2017    lumbar disc    CARDIAC CATHETERIZATION  11/03/2014    DR. MIRELES / no stents    COLONOSCOPY  08/29/2016    w/polypectomy     COLONOSCOPY N/A 09/29/2020    COLONOSCOPY WITH POLYPECTOMY performed by Eleanor Lesches, MD at [facility] N/A 10/07/2019    EUA HYSTEROSCOPY DILATATION AND CURETTAGE performed by Eb Artis DO at [facility]. [address], COLON, DIAGNOSTIC      EYE SURGERY      [place] with IOL OU / [address]    umbilical hernia repair    IR PORT PLACEMENT EQUAL OR GREATER THAN 5 YEARS  5/14/2021    IR PORT PLACEMENT EQUAL OR GREATER THAN 5 YEARS 5/14/2021 [facility] SPECIAL PROCEDURE    SD ESOPHAGOGASTRODUODENOSCOPY TRANSORAL DIAGNOSTIC N/A 03/24/2017    EGD ESOPHAGOGASTRODUODENOSCOPY performed by Olu Barajas MD at [facility] N/A 02/08/2018    negative findings    SD REVISE MEDIAN N/CARPAL TUNNEL SURG Left 2017    LEFT  CARPAL TUNNEL RELEASE performed by Ej Hough [facility] at [facility],4+D/U,IBQDL N/A 2018    TONSILLECTOMY      as child    TUNNELED VENOUS PORT PLACEMENT Right 2021    8 Fr [employer] Port ClearVUE by Dr. Kaitlyn Albert  2016    w/bx     UPPER GASTROINTESTINAL ENDOSCOPY N/A 2020    EGD possible biopsy performed by Meron Forman MD at [address] [place] [address] N/A 2020    EGD PUSH ENTEROSCOPY performed by Malorie Figueroa MD at [facility] Marital status:      Spouse name: Lucía Altamirano Number of children: 2    Years of education: 12    Highest education level: High school graduate   Occupational History    Occupation: Retired-   Tobacco Use    Smoking status: Former Smoker     Packs/day: 1.00     Years: 59.00     Pack years: 59.00     Types: Cigarettes     Start date: 2017     Quit date: 10/1/2020     Years since quittin.2    Smokeless tobacco: Never Used   Vaping Use    Vaping Use: Never used   Substance and Sexual Activity    Alcohol use: Not Currently    Drug use: No    Sexual activity: Yes     Partners: Male   Other Topics Concern    Not on file   Social History Narrative    Grew up in [address] [place] [address] With:  Librado Spouse    Type of Home: House    Home Layout: Two level, Performs ADL's on one level    Home Access: Stairs to enter with rails    Entrance Stairs - Number of Steps: 4    Bathroom Shower/Tub: Tub/Shower unit, Doors    Bathroom Equipment: Shower chair, Grab bars in [place]: Rolling walker, Cane, Oxygen(uses her O2 very seldom)    ADL Assistance: Needs assistance    Homemaking Assistance: Needs assistance    Homemaking Responsibilities: No(spouse performs)    Ambulation Assistance: Independent    Transfer Assistance: Independent Active : No    Occupation: Retired    Type of occupation: worked in [place]: patient enjoys [facility] Strain: Low Risk     Difficulty of Paying Living Expenses: Not hard at [facility] Insecurity: No Food Insecurity    Worried About [facility] in the Last Year: Never true   [address] in the Last Year: Never true   Transportation Needs:     Lack of Transportation (Medical): Not on file    Lack of Transportation (Non-Medical):  Not on file   Physical Activity:     Days of Exercise per Week: Not on file    Minutes of Exercise per Session: Not on file   Stress:     Feeling of Stress : Not on file   Social Connections:     Frequency of Communication with Friends and Family: Not on file    Frequency of Social Gatherings with Friends and Family: Not on file    Attends Bahai Services: Not on file    Active Member of [address] [place] [address] or Organizations: Not on file    Attends Club or Organization Meetings: Not on file    Marital Status: Not on file   Intimate Partner Violence:     Fear of Current or Ex-Partner: Not on file    Emotionally Abused: Not on file    Physically Abused: Not on file    Sexually Abused: Not on file   Housing Stability:     Unable to Pay for Housing in the Last Year: Not on file    Number of [place] in the Last Year: Not on file    Unstable Housing in the Last Year: Not on file     Family History   Problem Relation Age of Onset    Heart Disease Father         cardiac bypass    Arthritis Father     Arthritis Mother     Other Mother          at age 80    Other Sister         [facility]    No Known Problems Daughter     Stroke Son      Allergies   Allergen Reactions    Ibuprofen Nausea Only    Metformin And Related      Diarrhea      Darvon [Propoxyphene Hcl] Nausea And Vomiting     Current Outpatient Medications on File Prior to Visit   Medication Sig Dispense Refill    ACCU-CHEK PHYLLIS PLUS strip Test 3x daily Dx E11.65 100 each 3    glucose 4 g chewable tablet Take 4 tablets by mouth as needed for Low blood sugar 60 tablet 3    Glucagon (GVOKE HYPOPEN 2-PACK) 1 MG/0.2ML SOAJ Inject 1 mg into the skin every 15 minutes as needed (glucose less tahtn 70 or if symptomatic) 2 each 3    Continuous Blood Gluc Sensor (DEXCOM G6 SENSOR) MISC 1 Device by Does not apply route continuous 3 each 11    Continuous Blood Gluc Transmit (DEXCOM G6 TRANSMITTER) MISC 1 Device by Does not apply route continuous 1 each 3    Continuous Blood Gluc  (DEXCOM G6 ) [place] 1 Device by Does not apply route 4 times daily (before meals and nightly) 1 each 0    insulin glargine (LANTUS) 100 UNIT/ML injection vial 70 units at bedtime 3 each 3    insulin lispro (HUMALOG) 100 UNIT/ML injection vial 24 units injected three times daily before meals 20 mL 3    zoster recombinant adjuvanted vaccine (SHINGRIX) 50 MCG/0.5ML SUSR injection Inject 0.5 mLs into the muscle See Admin Instructions 1 dose now and repeat in 2-6 months 0.5 mL 1    digoxin (LANOXIN) 125 MCG tablet Take 1 tablet by mouth every other day 45 tablet 3    albuterol-ipratropium (COMBIVENT RESPIMAT)  MCG/ACT AERS inhaler Inhale 1 puff into the lungs every 6 hours as needed for Wheezing or Shortness of Breath 4 g 5    divalproex (DEPAKOTE) 500 MG DR tablet Take 1 tablet by mouth 2 times daily (Patient not taking: Reported on 11/3/2021) 60 tablet 2    ipratropium-albuterol (DUONEB) 0.5-2.5 (3) MG/3ML SOLN nebulizer solution Inhale 3 mLs into the lungs 3 times daily 360 mL 0    Cranberry 500 MG CAPS Take 500 mg by mouth daily      pantoprazole (PROTONIX) 40 MG tablet Take 1 tablet by mouth every morning (before breakfast) 90 tablet 3    Cholecalciferol (VITAMIN D3) 50 MCG (2000 UT) CAPS Take 2,000 Units by mouth Daily      amLODIPine (NORVASC) 5 MG tablet Take 1 tablet by mouth daily 90 tablet 1    aspirin 81 MG EC tablet Take 1 tablet by mouth daily 30 tablet 3    PARoxetine (PAXIL) 40 MG tablet TAKE 1 TABLET BY MOUTH ONCE DAILY IN THE MORNING 90 tablet 1    lidocaine-prilocaine (EMLA) 2.5-2.5 % cream APPLY CREAM TOPICALLY TO PORT SITE ONE HOUR PRIOR TO APPOINTMENT AS NEEDED      Handicap Placard MISC by Does not apply route Good from 7/15/21 - 7/15/26 1 each 0    rosuvastatin (CRESTOR) 40 MG tablet TAKE 1 TABLET BY MOUTH ONCE DAILY IN THE EVENING 90 tablet 3    carvedilol (COREG) 25 MG tablet Take 1.5 tablets by mouth 2 times daily (with meals) 270 tablet 2    Accu-Chek Softclix Lancets MISC bid 100 each 3    Blood Glucose Monitoring Suppl (ACCU-CHEK PHYLLIS CONNECT) w/Device KIT 1 kit by Does not apply route daily 1 kit 0     No current facility-administered medications on file prior to visit. Review of Systems   Constitutional: Positive for fatigue. Negative for activity change, appetite change, fever and unexpected weight change. HENT: Negative for trouble swallowing and voice change. Respiratory: Positive for shortness of breath (with exertion). Negative for cough and wheezing. Cardiovascular: Negative for chest pain and leg swelling. Gastrointestinal: Negative for abdominal distention, blood in stool, constipation, diarrhea and nausea. Genitourinary: Negative. Musculoskeletal: Positive for gait problem. Negative for back pain. Skin: Negative. Neurological: Positive for weakness. Negative for dizziness, seizures and speech difficulty. Psychiatric/Behavioral: Positive for dysphoric mood (at times). Negative for confusion, sleep disturbance and suicidal ideas. The patient is not nervous/anxious.           Objective:   BP (!) 125/51   Pulse 78   Temp 98.4 °F (36.9 °C)   Wt 146 lb (66.2 kg)   LMP  (LMP Unknown)   SpO2 97%   BMI 29.49 kg/m²    Wt Readings from Last 3 Encounters:   01/14/22 146 lb (66.2 kg)   09/02/21 156 lb 3.2 oz (70.9 kg)   08/06/21 152 lb (68.9 kg)     Physical Exam  Constitutional:       General: She is not in acute distress. HENT:      Head: Normocephalic and atraumatic. Nose: No rhinorrhea. Eyes:      General: No scleral icterus. Right eye: No discharge. Left eye: No discharge. Extraocular Movements: Extraocular movements intact. Conjunctiva/sclera: Conjunctivae normal.   Cardiovascular:      Rate and Rhythm: Normal rate and regular rhythm. Pulmonary:      Effort: Pulmonary effort is normal.      Breath sounds: Normal breath sounds. No wheezing or rales. Abdominal:      General: Bowel sounds are normal. There is no distension. Palpations: Abdomen is soft. Tenderness: There is no abdominal tenderness. Musculoskeletal:      Cervical back: Normal range of motion and neck supple. Right lower leg: No edema. Left lower leg: No edema. Skin:     General: Skin is warm and dry. Neurological:      Mental Status: She is alert and oriented to person, place, and time. Mental status is at baseline. Psychiatric:         Mood and Affect: Mood normal.         Behavior: Behavior normal.         Assessment and Plan:      1. Chronic obstructive pulmonary disease, unspecified COPD type ([address]  Stable. Continue COPD directed therapies. Call for increased SOB, cough, sputum production, excessive fatigue, fever, chills, or myalgias. Activity as tolerated. 2. Chronic combined systolic and diastolic CHF  Stable. Monitor weight daily, call if you gain 3 lbs in one day or 5 lbs. in one week or for increased edema, sob, cough, orthopnea, or chest pain. Patient follows with cardiology. 3. Seizures  No seizures. Patient remains off seizure medications. Continue to follow with neurology. 4. Uncontrolled type 2 DM with hyperglycemia  Continue to follow with endocrine for close monitoring. HgbA1c was at 11.4. Patient now taking insulin TID. Will likely need further adjustment of insulin.  BG running bw 200-300.    5. Other microscopic hematuria  6. Chronic UTI  Urology referral to r/o cancer. Patient likely colonized with bacteria. No need to treat at this time as patient is asymptomatic. 7. Port-A-Cath in place  Continue monthly flushes with OP infusion. 8. Depression  No red flags. We discussed underlying reasons for depression. Depression relates to her not being able to physically do the things she used to do. We discussed ways for her to participate in daily chores instead of opting for further medication management. Patient agreeable with current plan. Currently on Paxil 40 mg po daily. 9. Other fatigue  Ongoing. Patient's hgbA1c is also significantly elevated and is likely contributing. Will continue to monitor as blood glucose improves. Discussed gradually increasing activity. 10. Palliative care encounter  Discussed symptom management related to chronic disease/condition. Provided emotional support and active listening. Patient understands and is agreeable to current plan. Due to acuity, symptomatology and high-risk medication management, I advised patient to Return in about 1 month (around 2/14/2022). Total Time 50 minutes     Total time includes reviewing labs, reviewing history, medications, and allergies, counseling patient, performing medically appropriate evaluation and examination, placing orders, and documenting in the medical record.        JOHNATHON Quevedo - CNP    Collaborating physician: Dr. Adolph Awan

## 2022-01-14 NOTE — FLOWSHEET NOTE
Patient to the floor via wheelchair for her port flush. Vital signs taken. Denies any discomfort. Call light within reach.

## 2022-01-14 NOTE — FLOWSHEET NOTE
Multiple attempts to access her port. Took 2 nurses. Patient tolerated well. Left the unit via wheelchair. All equipment used in the care for this patient has been cleaned.

## 2022-01-16 PROBLEM — I50.42 CHRONIC COMBINED SYSTOLIC AND DIASTOLIC CHF (CONGESTIVE HEART FAILURE) (HCC): Status: ACTIVE | Noted: 2022-01-16

## 2022-01-16 PROBLEM — R31.29 OTHER MICROSCOPIC HEMATURIA: Status: ACTIVE | Noted: 2022-01-16

## 2022-01-16 ASSESSMENT — ENCOUNTER SYMPTOMS: COUGH: 0

## 2022-01-20 ENCOUNTER — TELEMEDICINE (OUTPATIENT)
Dept: ENDOCRINOLOGY | Age: 77
End: 2022-01-20
Payer: MEDICARE

## 2022-01-20 DIAGNOSIS — E11.65 UNCONTROLLED TYPE 2 DIABETES MELLITUS WITH HYPERGLYCEMIA (HCC): Primary | ICD-10-CM

## 2022-01-20 PROCEDURE — 1090F PRES/ABSN URINE INCON ASSESS: CPT | Performed by: PHYSICIAN ASSISTANT

## 2022-01-20 PROCEDURE — G8400 PT W/DXA NO RESULTS DOC: HCPCS | Performed by: PHYSICIAN ASSISTANT

## 2022-01-20 PROCEDURE — 4040F PNEUMOC VAC/ADMIN/RCVD: CPT | Performed by: PHYSICIAN ASSISTANT

## 2022-01-20 PROCEDURE — G8428 CUR MEDS NOT DOCUMENT: HCPCS | Performed by: PHYSICIAN ASSISTANT

## 2022-01-20 PROCEDURE — 99213 OFFICE O/P EST LOW 20 MIN: CPT | Performed by: PHYSICIAN ASSISTANT

## 2022-01-20 PROCEDURE — 1123F ACP DISCUSS/DSCN MKR DOCD: CPT | Performed by: PHYSICIAN ASSISTANT

## 2022-01-20 RX ORDER — BLOOD SUGAR DIAGNOSTIC
STRIP MISCELLANEOUS
Qty: 100 EACH | Refills: 3 | Status: SHIPPED | OUTPATIENT
Start: 2022-01-20 | End: 2022-02-28 | Stop reason: SDUPTHER

## 2022-01-20 ASSESSMENT — ENCOUNTER SYMPTOMS
WHEEZING: 0
DIARRHEA: 0
NAUSEA: 0
EYE REDNESS: 0
SINUS PRESSURE: 0
RHINORRHEA: 0
SHORTNESS OF BREATH: 0
COUGH: 0
ABDOMINAL PAIN: 0
VOMITING: 0
SORE THROAT: 0
EYE PAIN: 0

## 2022-01-20 NOTE — PROGRESS NOTES
1/20/2022     Jalil Robertson, was evaluated through a synchronous (real-time) audio-video encounter. The patient (or guardian if applicable) is aware that this is a billable service, which includes applicable co-pays. This Virtual Visit was conducted with patient's (and/or legal guardian's) consent. The visit was conducted pursuant to the emergency declaration under the [facility], [address] [place] [address] authority and the [employer] Act. Patient identification was verified, and a caregiver was present when appropriate. The patient was located in a state where the provider was licensed to provide care. Assessment:       Diagnosis Orders   1. Uncontrolled type 2 diabetes mellitus with hyperglycemia (HCC)       AVG am glucose range 250-300    PLAN:     1. Increase Lantus inject 65 units at bedtime  2. Increase Humalog 24 units before lunch, Increase 28 units before dinner   3. Check blood glucose 4 times daily and document  4. Dexcom G6 being used  5. Repeat FASTING labs in 3 months   6. Follow up in 3 months    No orders of the defined types were placed in this encounter. No orders of the defined types were placed in this encounter. No follow-ups on file. Subjective:     No chief complaint on file. There were no vitals filed for this visit. Wt Readings from Last 3 Encounters:   01/14/22 146 lb (66.2 kg)   09/02/21 156 lb 3.2 oz (70.9 kg)   08/06/21 152 lb (68.9 kg)     BP Readings from Last 3 Encounters:   01/14/22 (!) 126/57   01/14/22 (!) 125/51   12/09/21 127/78     Patient 68-year-old type II diabetic female with worsening glycemic control. Patient received the Dexcom G6 CGM, I encouraged her to start utilizing it today and she agreed. I made insulin dosing adjustments. Diabetes  She presents for her follow-up diabetic visit. She has type 2 diabetes mellitus. The initial diagnosis of diabetes was made 30 years ago. Her disease course has been worsening. Pertinent negatives for hypoglycemia include no dizziness or headaches. Associated symptoms include polydipsia (improving). Pertinent negatives for diabetes include no chest pain, no fatigue and no polyuria. Hypoglycemia complications include hospitalization, required assistance and required glucagon injection. Symptoms are stable. Diabetic complications include heart disease. Pertinent negatives for diabetic complications include no CVA. Risk factors for coronary artery disease include diabetes mellitus and dyslipidemia. Current diabetic treatment includes insulin injections. She is compliant with treatment all of the time. She is currently taking insulin pre-breakfast, pre-lunch, pre-dinner and at bedtime. Insulin injections are given by patient and adult caretaker. Rotation sites for injection include the abdominal wall and arms. She is following a generally healthy diet. When asked about meal planning, she reported none. She never participates in exercise. Her home blood glucose trend is increasing steadily. Her overall blood glucose range is >200 mg/dl. An ACE inhibitor/angiotensin II receptor blocker is being taken. She does not see a podiatrist.Eye exam is current. Past Medical History:   Diagnosis Date    Adenomatous polyp of sigmoid colon     Adenomatous polyp of transverse colon     Anxiety     Asthma     dx 2019 / has smoked since age 11    Cardiopulmonary arrest ([facility] [address]     CHF (congestive heart failure) (HCC)     Chronic back pain     Bilateral L5 S1 Radic on emg--surprisingly worse on the left than the right--pt's symptoms and her MRI show worse on the right    Chronic obstructive pulmonary disease with acute exacerbation ([facility] [address] 10/12/2019    Depression     ESBL E. coli carrier     Carrier.     Fibromyalgia     Gastrointestinal hemorrhage 2/24/2020    Hyperlipidemia     meds > 8 yrs    Hypertension     meds > 45 yrs    Insomnia 12/4/2013    On home O2     2l per n/c at bedtime mostly,     Osteoarthritis     Seizures ([facility]     Sepsis ([facility] 10/6/2020    Smoker 6/18/2013    Type II diabetes mellitus, uncontrolled ([facility]     hx > 8 yrs    Unspecified sleep apnea      Past Surgical History:   Procedure Laterality Date    BACK SURGERY  2017    lumbar disc    CARDIAC CATHETERIZATION  11/03/2014    DR. MIRELES / no stents    COLONOSCOPY  08/29/2016    w/polypectomy     COLONOSCOPY N/A 09/29/2020    COLONOSCOPY WITH POLYPECTOMY performed by Michael Alejandre [facility] at [facility] N/A 10/07/2019    EUA HYSTEROSCOPY DILATATION AND CURETTAGE performed by Zackery Gibbs DO at [facility]. [address], COLON, DIAGNOSTIC      EYE SURGERY      [place] with IOL OU / [address] [place]  [id number]    umbilical hernia repair    IR PORT PLACEMENT EQUAL OR GREATER THAN 5 YEARS  5/14/2021    IR PORT PLACEMENT EQUAL OR GREATER THAN 5 YEARS 5/14/2021 [facility] SPECIAL PROCEDURE    LA ESOPHAGOGASTRODUODENOSCOPY TRANSORAL DIAGNOSTIC N/A 03/24/2017    EGD ESOPHAGOGASTRODUODENOSCOPY performed by Kelsy Francis MD at [place] [id number] 02/08/2018    negative findings    LA REVISE MEDIAN N/CARPAL TUNNEL SURG Left 06/05/2017    LEFT  CARPAL TUNNEL RELEASE performed by Nahomy [facility]cdaniel MD at [facility],0+H/I,DVIFX N/A 02/08/2018    TONSILLECTOMY      as child    TUNNELED VENOUS PORT PLACEMENT Right 05/14/2021    [address] [employer] Port ClearVUE by Dr. Beverly Mckenzie  08/26/2016    w/bx     UPPER GASTROINTESTINAL ENDOSCOPY N/A 02/25/2020    EGD possible biopsy performed by Michael Alejandre MD at [address] [place] [address] N/A 07/01/2020    EGD PUSH ENTEROSCOPY performed by Samanta Phelan MD at [facility] Marital status:      Spouse name: Savannah Ady Number of children: 2    Years of education: 15    Highest education level: High school graduate   Occupational History    Occupation: Retired-   Tobacco Use    Smoking status: Former Smoker     Packs/day: 1.00     Years: 59.00     Pack years: 59.00     Types: Cigarettes     Start date: 2017     Quit date: 10/1/2020     Years since quittin.3    Smokeless tobacco: Never Used   Vaping Use    Vaping Use: Never used   Substance and Sexual Activity    Alcohol use: Not Currently    Drug use: No    Sexual activity: Yes     Partners: Male   Other Topics Concern    Not on file   Social History Narrative    Grew up in [place]     Lives With:  [id number] Spouse    Type of Home: House    Home Layout: Two level, Performs ADL's on one level    Home Access: Stairs to enter with rails    Entrance Stairs - Number of Steps: 4    Bathroom Shower/Tub: Tub/Shower unit, Doors    Bathroom Equipment: Shower chair, Grab bars in [address]: Rolling walker, Cane, Oxygen(uses her O2 very seldom)    ADL Assistance: Needs assistance    Homemaking Assistance: Needs assistance    Homemaking Responsibilities: No(spouse performs)    Ambulation Assistance: Independent    Transfer Assistance: Independent    Active : No    Occupation: Retired    Type of occupation: worked in [employer]: patient enjoys [facility] Strain: Low Risk     Difficulty of Paying Living Expenses: Not hard at [facility] Insecurity: No Food Insecurity    Worried About [facility] in the Last Year: Never true    [address] in the Last Year: Never true   Transportation Needs:     Lack of Transportation (Medical): Not on file    Lack of Transportation (Non-Medical):  Not on file   Physical Activity:     Days of Exercise per Week: Not on file    Minutes of Exercise per Session: Not on file   Stress:     Feeling of Stress : Not on file   Social Connections:     Frequency of Communication with Friends and Family: Not on file    Frequency of Social Gatherings with Friends and Family: Not on file    Attends Christianity Services: Not on file    Active Member of Clubs or Organizations: Not on file    Attends Club or Organization Meetings: Not on file    Marital Status: Not on file   Intimate Partner Violence:     Fear of Current or Ex-Partner: Not on file    Emotionally Abused: Not on file    Physically Abused: Not on file    Sexually Abused: Not on file   Housing Stability:     Unable to Pay for Housing in the Last Year: Not on file    Number of [place] in the Last Year: Not on file    Unstable Housing in the Last Year: Not on file     Family History   Problem Relation Age of Onset    Heart Disease Father         cardiac bypass    Arthritis Father     Arthritis Mother     Other Mother          at age 80    Other Sister         [facility] access [facility]    No Known Problems Daughter     Stroke Son      Allergies   Allergen Reactions    Ibuprofen Nausea Only    Metformin And Related      Diarrhea      Darvon [Propoxyphene Hcl] Nausea And Vomiting       Current Outpatient Medications:     ACCU-CHEK PHYLLIS PLUS strip, Test 3x daily Dx E11.65, Disp: 100 each, Rfl: 3    glucose 4 g chewable tablet, Take 4 tablets by mouth as needed for Low blood sugar, Disp: 60 tablet, Rfl: 3    Glucagon (GVOKE HYPOPEN 2-PACK) 1 MG/0.2ML SOAJ, Inject 1 mg into the skin every 15 minutes as needed (glucose less tahtn 70 or if symptomatic), Disp: 2 each, Rfl: 3    Continuous Blood Gluc Sensor (DEXCOM G6 SENSOR) MISC, 1 Device by Does not apply route continuous, Disp: 3 each, Rfl: 11    Continuous Blood Gluc Transmit (DEXCOM G6 TRANSMITTER) MISC, 1 Device by Does not apply route continuous, Disp: 1 each, Rfl: 3    Continuous Blood Gluc  (DEXCOM G6 ) [place], 1 Device by Does not apply route 4 times daily (before meals and nightly), Disp: 1 each, Rfl: 0    insulin glargine (LANTUS) 100 UNIT/ML injection vial, 70 units at bedtime, Disp: 3 each, Rfl: 3    insulin lispro (HUMALOG) 100 UNIT/ML injection vial, 24 units injected three times daily before meals, Disp: 20 mL, Rfl: 3    zoster recombinant adjuvanted vaccine (SHINGRIX) 50 MCG/0.5ML SUSR injection, Inject 0.5 mLs into the muscle See Admin Instructions 1 dose now and repeat in 2-6 months, Disp: 0.5 mL, Rfl: 1    digoxin (LANOXIN) 125 MCG tablet, Take 1 tablet by mouth every other day, Disp: 45 tablet, Rfl: 3    albuterol-ipratropium (COMBIVENT RESPIMAT)  MCG/ACT AERS inhaler, Inhale 1 puff into the lungs every 6 hours as needed for Wheezing or Shortness of Breath, Disp: 4 g, Rfl: 5    divalproex (DEPAKOTE) 500 MG DR tablet, Take 1 tablet by mouth 2 times daily (Patient not taking: Reported on 11/3/2021), Disp: 60 tablet, Rfl: 2    ipratropium-albuterol (DUONEB) 0.5-2.5 (3) MG/3ML SOLN nebulizer solution, Inhale 3 mLs into the lungs 3 times daily, Disp: 360 mL, Rfl: 0    Cranberry 500 MG CAPS, Take 500 mg by mouth daily, Disp: , Rfl:     pantoprazole (PROTONIX) 40 MG tablet, Take 1 tablet by mouth every morning (before breakfast), Disp: 90 tablet, Rfl: 3    Cholecalciferol (VITAMIN D3) 50 MCG (2000 UT) CAPS, Take 2,000 Units by mouth Daily, Disp: , Rfl:     amLODIPine (NORVASC) 5 MG tablet, Take 1 tablet by mouth daily, Disp: 90 tablet, Rfl: 1    aspirin 81 MG EC tablet, Take 1 tablet by mouth daily, Disp: 30 tablet, Rfl: 3    PARoxetine (PAXIL) 40 MG tablet, TAKE 1 TABLET BY MOUTH ONCE DAILY IN THE MORNING, Disp: 90 tablet, Rfl: 1    lidocaine-prilocaine (EMLA) 2.5-2.5 % cream, APPLY CREAM TOPICALLY TO PORT SITE ONE HOUR PRIOR TO APPOINTMENT AS NEEDED, Disp: , Rfl:     Handicap Placard MISC, by Does not apply route Good from 7/15/21 - 7/15/26, Disp: 1 each, Rfl: 0    rosuvastatin (CRESTOR) 40 MG tablet, TAKE 1 TABLET BY MOUTH ONCE DAILY IN THE EVENING, Disp: 90 tablet, Rfl: 3   carvedilol (COREG) 25 MG tablet, Take 1.5 tablets by mouth 2 times daily (with meals), Disp: 270 tablet, Rfl: 2    Accu-Chek Softclix Lancets MISC, bid, Disp: 100 each, Rfl: 3    Blood Glucose Monitoring Suppl (ACCU-CHEK PHYLLIS CONNECT) w/Device KIT, 1 kit by Does not apply route daily, Disp: 1 kit, Rfl: 0  Lab Results   Component Value Date     01/14/2022    K 4.0 01/14/2022     01/14/2022    CO2 22 01/14/2022    BUN 17 01/14/2022    CREATININE 2.18 (H) 01/14/2022    GLUCOSE 215 (H) 01/14/2022    CALCIUM 10.3 (H) 01/14/2022    PROT 6.7 10/13/2021    LABALBU 3.9 01/14/2022    BILITOT <0.2 10/13/2021    ALKPHOS 104 10/13/2021    AST 16 10/13/2021    ALT 15 10/13/2021    LABGLOM 21.8 (L) 01/14/2022    GFRAA 26.4 (L) 01/14/2022    GLOB 2.4 10/13/2021     Lab Results   Component Value Date    WBC 7.3 10/13/2021    HGB 11.3 (L) 10/13/2021    HCT 34.8 (L) 10/13/2021    MCV 88.1 10/13/2021     10/13/2021     Lab Results   Component Value Date    LABA1C 11.4 12/09/2021    LABA1C 7.5 (H) 04/29/2021    LABA1C 9.6 (H) 03/19/2021     Lab Results   Component Value Date    HDL 61 (H) 11/30/2020    HDL 42 02/24/2020    HDL 61 (H) 10/13/2019    LDLCALC 69 11/30/2020    LDLCALC 50 02/24/2020    LDLCALC 73 10/13/2019    CHOL 143 11/30/2020    CHOL 105 02/24/2020    CHOL 158 10/13/2019    TRIG 64 11/30/2020    TRIG 67 02/24/2020    TRIG 118 10/13/2019     No results found for: TESTM  Lab Results   Component Value Date    TSH 0.474 11/30/2020    TSH 1.070 10/05/2020    TSH 1.200 10/13/2019    TSHREFLEX 0.777 08/27/2020     No results found for: TPOABS    Review of Systems   Constitutional: Negative for chills, fatigue and fever. HENT: Negative for congestion, ear pain, postnasal drip, rhinorrhea, sinus pressure and sore throat. Eyes: Negative for pain and redness. Respiratory: Negative for cough, shortness of breath and wheezing. Cardiovascular: Negative for chest pain, palpitations and leg swelling.

## 2022-01-24 RX ORDER — AMLODIPINE BESYLATE 5 MG/1
5 TABLET ORAL DAILY
Qty: 90 TABLET | Refills: 3 | Status: SHIPPED | OUTPATIENT
Start: 2022-01-24

## 2022-01-24 NOTE — TELEPHONE ENCOUNTER
Please approve or deny this refill request. The order is pended. Thank you.     LOV 11/5/2021    Next Visit Date:  Future Appointments   Date Time Provider Zaid Lr   1/25/2022 11:15 AM Consuelo Nogueira MD [facility]   2/21/2022  2:00 PM Marco Howard, [address] [place] [address]

## 2022-02-09 ENCOUNTER — TELEPHONE (OUTPATIENT)
Dept: FAMILY MEDICINE CLINIC | Age: 77
End: 2022-02-09

## 2022-02-11 ENCOUNTER — OFFICE VISIT (OUTPATIENT)
Dept: UROLOGY | Age: 77
End: 2022-02-11
Payer: MEDICARE

## 2022-02-11 VITALS
SYSTOLIC BLOOD PRESSURE: 98 MMHG | WEIGHT: 155.8 LBS | HEIGHT: 59 IN | DIASTOLIC BLOOD PRESSURE: 60 MMHG | OXYGEN SATURATION: 98 % | HEART RATE: 79 BPM | BODY MASS INDEX: 31.41 KG/M2

## 2022-02-11 DIAGNOSIS — R33.9 RETENTION OF URINE: ICD-10-CM

## 2022-02-11 DIAGNOSIS — N39.0 CHRONIC UTI: Primary | ICD-10-CM

## 2022-02-11 LAB
BILIRUBIN, POC: ABNORMAL
BLOOD URINE, POC: ABNORMAL
CLARITY, POC: CLEAR
COLOR, POC: YELLOW
GLUCOSE URINE, POC: ABNORMAL
KETONES, POC: ABNORMAL
LEUKOCYTE EST, POC: ABNORMAL
NITRITE, POC: ABNORMAL
PH, POC: 5.5
POST VOID RESIDUAL (PVR): 0 ML
PROTEIN, POC: ABNORMAL
SPECIFIC GRAVITY, POC: >=1.03
UROBILINOGEN, POC: 0.2

## 2022-02-11 PROCEDURE — 81003 URINALYSIS AUTO W/O SCOPE: CPT | Performed by: UROLOGY

## 2022-02-11 PROCEDURE — 1123F ACP DISCUSS/DSCN MKR DOCD: CPT | Performed by: UROLOGY

## 2022-02-11 PROCEDURE — 51798 US URINE CAPACITY MEASURE: CPT | Performed by: UROLOGY

## 2022-02-11 PROCEDURE — G8484 FLU IMMUNIZE NO ADMIN: HCPCS | Performed by: UROLOGY

## 2022-02-11 PROCEDURE — 1090F PRES/ABSN URINE INCON ASSESS: CPT | Performed by: UROLOGY

## 2022-02-11 PROCEDURE — G8417 CALC BMI ABV UP PARAM F/U: HCPCS | Performed by: UROLOGY

## 2022-02-11 PROCEDURE — G8427 DOCREV CUR MEDS BY ELIG CLIN: HCPCS | Performed by: UROLOGY

## 2022-02-11 PROCEDURE — 4040F PNEUMOC VAC/ADMIN/RCVD: CPT | Performed by: UROLOGY

## 2022-02-11 PROCEDURE — G8400 PT W/DXA NO RESULTS DOC: HCPCS | Performed by: UROLOGY

## 2022-02-11 PROCEDURE — 1036F TOBACCO NON-USER: CPT | Performed by: UROLOGY

## 2022-02-11 PROCEDURE — 99203 OFFICE O/P NEW LOW 30 MIN: CPT | Performed by: UROLOGY

## 2022-02-11 RX ORDER — NITROFURANTOIN MACROCRYSTALS 50 MG/1
50 CAPSULE ORAL NIGHTLY
Qty: 90 CAPSULE | Refills: 3 | Status: SHIPPED | OUTPATIENT
Start: 2022-02-11 | End: 2022-05-12

## 2022-02-11 NOTE — PROGRESS NOTES
[facility] EVALUATION NOTE                                                 H&P                                                                                                                                                 Reason for Visit  Recurrent UTIs    History of Present Illness  58-year-old female with diabetes  No history of pelvic prolapse  Normal CT scan with upper normal tracks no evidence of cystocele  Poorly controlled diabetes  Patient will be started on prophylaxis nitrofurantoin 50 mg p.o. daily      Urologic Review of Systems/Symptoms  No evidence of incontinence    Review of Systems  Hospitalization: None recent  All 14 categories of Review of Systems otherwise reviewed no other findings reported. Review of systems unchanged over the past year better blood sugar control  Past Medical History:   Diagnosis Date    Adenomatous polyp of sigmoid colon     Adenomatous polyp of transverse colon     Anxiety     Asthma     dx 2019 / has smoked since age 11    Cardiopulmonary arrest ([address]     CH[facility]     Chronic back pain     Bilateral L5 S1 Radic on emg--surprisingly worse on the left than the right--pt's symptoms and her MRI show worse on the right    Chronic obstructive pulmonary disease with acute exacerbation ([address] 10/12/2019    Depression     ESBL E. coli carrier     Carrier.  Fibromyalgia     Gastrointestinal hemorrhage 2/24/2020    Hyperlipidemia     meds > 8 yrs    Hypertension     meds > 45 yrs    Insomnia 12/4/2013    On home O2     2l per n/c at bedtime mostly,     Osteoarthritis     Seizures ([address]     Sepsis ([address] 10/6/2020    Smoker 6/18/2013    Type II diabetes mellitus, uncontrolled ([address]     hx > 8 yrs    Unspecified sleep apnea      Past Surgical History:   Procedure Laterality Date    BACK SURGERY  2017    lumbar disc    CARDIAC CATHETERIZATION  11/03/2014    DR. MIRELES / no stents    COLONOSCOPY  08/29/2016    w/polypectomy     COLONOSCOPY N/A 2020    COLONOSCOPY WITH POLYPECTOMY performed by Salvador Yeh MD at [facility] N/A 10/07/2019    EUA HYSTEROSCOPY DILATATION AND CURETTAGE performed by Cary Franz DO at [address] [place] [address] ENDOSCOPY, COLON, DIAGNOSTIC      EYE SURGERY      [place] with IOL OU / [address] [place]  [address]25    umbilical hernia repair    IR PORT PLACEMENT EQUAL OR GREATER THAN 5 YEARS  2021    IR PORT PLACEMENT EQUAL OR GREATER THAN 5 YEARS 2021 [facility] SPECIAL PROCEDURE    ND ESOPHAGOGASTRODUODENOSCOPY TRANSORAL DIAGNOSTIC N/A 2017    EGD ESOPHAGOGASTRODUODENOSCOPY performed by Kodi Rodriguez MD at [place] [address] 2018    negative findings    ND REVISE MEDIAN N/CARPAL TUNNEL SURG Left 2017    LEFT  CARPAL TUNNEL RELEASE performed by Tavo Ferguson MD at [facility],6+L/F,IPGUO N/A 2018    TONSILLECTOMY      as child    TUNNELED VENOUS PORT PLACEMENT Right 2021    8 [address] [employer] Port ClearVUE by Dr. Parks Course  2016    w/bx     UPPER GASTROINTESTINAL ENDOSCOPY N/A 2020    EGD possible biopsy performed by Salvador Yeh MD at [address] [place] [address] 2020    EGD PUSH ENTEROSCOPY performed by Patsy Ma MD at [facility] Marital status:      Spouse name: Aster Khalil Number of children: 2    Years of education: 12    Highest education level: High school graduate   Occupational History    Occupation: Retired-   Tobacco Use    Smoking status: Former Smoker     Packs/day: 1.00     Years: 59.00     Pack years: 59.00     Types: Cigarettes     Start date: 2017     Quit date: 10/1/2020     Years since quittin.3    Smokeless tobacco: Never Used   Vaping Use    Vaping Use: Never used   Substance and Sexual Activity    Alcohol use: Not Currently    Drug use: No    Sexual activity: Yes     Partners: Male   Other Topics Concern    None   Social History Narrative    Grew up in [place]     Lives With:  [id number] Spouse    Type of Home: House    Home Layout: Two level, Performs ADL's on one level    Home Access: Stairs to enter with rails    Entrance Stairs - Number of Steps: 4    Bathroom Shower/Tub: Tub/Shower unit, Doors    Bathroom Equipment: Shower chair, Grab bars in shower    Home Equipment: Rolling walker, Cane, Oxygen(uses her O2 very seldom)    ADL Assistance: Needs assistance    Homemaking Assistance: Needs assistance    Homemaking Responsibilities: No(spouse performs)    Ambulation Assistance: Independent    Transfer Assistance: Independent    Active : No    Occupation: Retired    Type of occupation: worked in [employer]: patient enjoys [address] [facility] [email or web address] Strain: Low Risk     Difficulty of Paying Living Expenses: Not hard at [employer] Insecurity: No Food Insecurity    Worried About [address] [facility][1] in the Last Year: Never true    [address] [facility] [address] in the Last Year: Never true   Transportation Needs:     Lack of Transportation (Medical): Not on file    Lack of Transportation (Non-Medical):  Not on file   Physical Activity:     Days of Exercise per Week: Not on file    Minutes of Exercise per Session: Not on file   Stress:     Feeling of Stress : Not on file   Social Connections:     Frequency of Communication with Friends and Family: Not on file    Frequency of Social Gatherings with Friends and Family: Not on file    Attends Religion Services: Not on file    Active Member of Clubs or Organizations: Not on file    Attends Club or Organization Meetings: Not on file    Marital Status: Not on file   Intimate Partner Violence:     Fear of Current or Ex-Partner: Not on file   [employer] Abused: Not on file    Physically Abused: Not on file    Sexually Abused: Not on file   Housing Stability:     Unable to Pay for Housing in the Last Year: Not on file    Number of Places Lived in the Last Year: Not on file    Unstable Housing in the Last Year: Not on file     Family History   Problem Relation Age of Onset    Heart Disease Father         cardiac bypass    Arthritis Father     Arthritis Mother     Other Mother          at age 80    Other Sister         [facility]    No Known Problems Daughter     Stroke Son      Current Outpatient Medications   Medication Sig Dispense Refill    nitrofurantoin (MACRODANTIN) 50 MG capsule Take 1 capsule by mouth nightly 90 capsule 3    amLODIPine (NORVASC) 5 MG tablet Take 1 tablet by mouth daily 90 tablet 3    ACCU-CHEK PHYLLIS PLUS strip Test 3x daily Dx E11.65 100 each 3    Glucagon (GVOKE HYPOPEN 2-PACK) 1 MG/0.2ML SOAJ Inject 1 mg into the skin every 15 minutes as needed (glucose less tahtn 70 or if symptomatic) 2 each 3    Continuous Blood Gluc Sensor (DEXCOM G6 SENSOR) MISC 1 Device by Does not apply route continuous 3 each 11    Continuous Blood Gluc Transmit (DEXCOM G6 TRANSMITTER) MISC 1 Device by Does not apply route continuous 1 each 3    Continuous Blood Gluc  (DEXCOM G6 ) [place] 1 Device by Does not apply route 4 times daily (before meals and nightly) 1 each 0    insulin glargine (LANTUS) 100 UNIT/ML injection vial 70 units at bedtime 3 each 3    insulin lispro (HUMALOG) 100 UNIT/ML injection vial 24 units injected three times daily before meals 20 mL 3    zoster recombinant adjuvanted vaccine (SHINGRIX) 50 MCG/0.5ML SUSR injection Inject 0.5 mLs into the muscle See Admin Instructions 1 dose now and repeat in 2-6 months 0.5 mL 1    digoxin (LANOXIN) 125 MCG tablet Take 1 tablet by mouth every other day 45 tablet 3    albuterol-ipratropium (COMBIVENT RESPIMAT)  MCG/ACT AERS inhaler Inhale 1 puff into the lungs every 6 hours as needed for Wheezing or Shortness of Breath 4 g 5    ipratropium-albuterol (DUONEB) 0.5-2.5 (3) MG/3ML SOLN nebulizer solution Inhale 3 mLs into the lungs 3 times daily 360 mL 0    Cranberry 500 MG CAPS Take 500 mg by mouth daily      pantoprazole (PROTONIX) 40 MG tablet Take 1 tablet by mouth every morning (before breakfast) 90 tablet 3    Cholecalciferol (VITAMIN D3) 50 MCG (2000 UT) CAPS Take 2,000 Units by mouth Daily      PARoxetine (PAXIL) 40 MG tablet TAKE 1 TABLET BY MOUTH ONCE DAILY IN THE MORNING 90 tablet 1    rosuvastatin (CRESTOR) 40 MG tablet TAKE 1 TABLET BY MOUTH ONCE DAILY IN THE EVENING 90 tablet 3    carvedilol (COREG) 25 MG tablet Take 1.5 tablets by mouth 2 times daily (with meals) 270 tablet 2    Accu-Chek Softclix Lancets MISC bid 100 each 3    Blood Glucose Monitoring Suppl (ACCU-CHEK PHYLLIS CONNECT) w/Device KIT 1 kit by Does not apply route daily 1 kit 0    glucose 4 g chewable tablet Take 4 tablets by mouth as needed for Low blood sugar (Patient not taking: Reported on 2/11/2022) 60 tablet 3    divalproex (DEPAKOTE) 500 MG DR tablet Take 1 tablet by mouth 2 times daily (Patient not taking: Reported on 11/3/2021) 60 tablet 2    lidocaine-prilocaine (EMLA) 2.5-2.5 % cream APPLY CREAM TOPICALLY TO PORT SITE ONE HOUR PRIOR TO APPOINTMENT AS NEEDED (Patient not taking: Reported on 2/11/2022)      Handicap Placard MISC by Does not apply route Good from 7/15/21 - 7/15/26 1 each 0     No current facility-administered medications for this visit.      Ibuprofen, Metformin and related, and Darvon [propoxyphene hcl]  All reviewed and verified by Dr Parsons Officer on today's visit    No results found for: PSA, PSADIA  Results for POC orders placed in visit on 02/11/22   POCT Urinalysis No Micro (Auto)   Result Value Ref Range    Color, UA yellow     Clarity, UA clear     Glucose, UA POC >=1000 mg/dL     Bilirubin, UA neg     Ketones, UA neg     Spec Grav, UA >=1.030 Blood, UA POC moderate     pH, UA 5.5     Protein, UA  mg/dL     Urobilinogen, UA 0.2     Leukocytes, UA neg     Nitrite, UA neg    poct post void residual   Result Value Ref Range    post void residual 0 ml    Narrative    A point of care test   Post Void Residual was completed by performing  ultrasound scan of the bladder and  reviewed by Dr Rafael Sweet       Physical Exam  Vitals:    02/11/22 1148   BP: 98/60   Pulse: 79   SpO2: 98%   Weight: 155 lb 12.8 oz (70.7 kg)   Height: 4' 11\" (1.499 m)     Constitutional: Not in distress. Cardiovascular: Normal rate, BP reviewed. Normal  Pulmonary/Chest: Normal respiratory effort not short of breath  Abdominal: Not distended. No suprapubic tenderness  Urologic Exam  Urinalysis clear no evidence of UTI. Postvoid residual 0  Assessment/Medical Necessity-Decision Making  Recurrent UTIs combination of factors including poorly controlled diabetes stasis secondary to diabetes  Plan  Begin nitrofurantoin 50 mg p.o. daily  Follow-up 3 months check urinalysis  Greater than 50% of 35 minutes spent consulting patient face-to-face  Orders Placed This Encounter   Procedures    POCT Urinalysis No Micro (Auto)    poct post void residual     Orders Placed This Encounter   Medications    nitrofurantoin (MACRODANTIN) 50 MG capsule     Sig: Take 1 capsule by mouth nightly     Dispense:  90 capsule     Refill:  3     Pierre Krishna MD       Please note this report has been partially produced using speech recognition software  And may cause contain errors related to that system including grammar, punctuation and spelling as well as words and phrases that may seem inappropriate. If there are questions or concerns please feel free to contact me to clarify.

## 2022-02-17 ENCOUNTER — TELEMEDICINE (OUTPATIENT)
Dept: FAMILY MEDICINE CLINIC | Age: 77
End: 2022-02-17
Payer: MEDICARE

## 2022-02-17 ENCOUNTER — HOSPITAL ENCOUNTER (OUTPATIENT)
Dept: INFUSION THERAPY | Age: 77
Setting detail: INFUSION SERIES
End: 2022-02-17
Payer: MEDICARE

## 2022-02-17 DIAGNOSIS — M48.062 SPINAL STENOSIS OF LUMBAR REGION WITH NEUROGENIC CLAUDICATION: ICD-10-CM

## 2022-02-17 DIAGNOSIS — R56.9 SEIZURES (HCC): ICD-10-CM

## 2022-02-17 DIAGNOSIS — J44.9 CHRONIC OBSTRUCTIVE PULMONARY DISEASE, UNSPECIFIED COPD TYPE (HCC): Primary | ICD-10-CM

## 2022-02-17 DIAGNOSIS — M79.7 FIBROMYALGIA: ICD-10-CM

## 2022-02-17 DIAGNOSIS — F33.42 MAJOR DEPRESSIVE DISORDER, RECURRENT, IN FULL REMISSION (HCC): ICD-10-CM

## 2022-02-17 DIAGNOSIS — N18.4 CHRONIC KIDNEY DISEASE, STAGE 4 (SEVERE) (HCC): ICD-10-CM

## 2022-02-17 DIAGNOSIS — K92.2 GASTROINTESTINAL HEMORRHAGE, UNSPECIFIED GASTROINTESTINAL HEMORRHAGE TYPE: ICD-10-CM

## 2022-02-17 DIAGNOSIS — I50.42 CHRONIC COMBINED SYSTOLIC AND DIASTOLIC CHF (CONGESTIVE HEART FAILURE) (HCC): ICD-10-CM

## 2022-02-17 DIAGNOSIS — E78.00 PURE HYPERCHOLESTEROLEMIA: ICD-10-CM

## 2022-02-17 DIAGNOSIS — I10 ESSENTIAL HYPERTENSION: ICD-10-CM

## 2022-02-17 DIAGNOSIS — I42.1 HOCM (HYPERTROPHIC OBSTRUCTIVE CARDIOMYOPATHY) (HCC): ICD-10-CM

## 2022-02-17 PROCEDURE — 3023F SPIROM DOC REV: CPT | Performed by: FAMILY MEDICINE

## 2022-02-17 PROCEDURE — 1036F TOBACCO NON-USER: CPT | Performed by: FAMILY MEDICINE

## 2022-02-17 PROCEDURE — G8484 FLU IMMUNIZE NO ADMIN: HCPCS | Performed by: FAMILY MEDICINE

## 2022-02-17 PROCEDURE — G8427 DOCREV CUR MEDS BY ELIG CLIN: HCPCS | Performed by: FAMILY MEDICINE

## 2022-02-17 PROCEDURE — 1090F PRES/ABSN URINE INCON ASSESS: CPT | Performed by: FAMILY MEDICINE

## 2022-02-17 PROCEDURE — 99214 OFFICE O/P EST MOD 30 MIN: CPT | Performed by: FAMILY MEDICINE

## 2022-02-17 PROCEDURE — G8400 PT W/DXA NO RESULTS DOC: HCPCS | Performed by: FAMILY MEDICINE

## 2022-02-17 PROCEDURE — G8417 CALC BMI ABV UP PARAM F/U: HCPCS | Performed by: FAMILY MEDICINE

## 2022-02-17 PROCEDURE — 1123F ACP DISCUSS/DSCN MKR DOCD: CPT | Performed by: FAMILY MEDICINE

## 2022-02-17 PROCEDURE — 4040F PNEUMOC VAC/ADMIN/RCVD: CPT | Performed by: FAMILY MEDICINE

## 2022-02-17 NOTE — PROGRESS NOTES
2022    TELEHEALTH EVALUATION -- Audio/Visual (During [facility] public health emergency)    HPI:    Lev Valle (:  1945) has requested an audio/video evaluation for the following concern(s):      Vaginal bleeding and discomfort  Patient is coming with her daughter with a chief complaint of vaginal discomfort and bleeding. Denies any current bleeding. Denies any fevers, chills, nausea, vomiting or chest pain, shortness of breath, abdominal pain currently, change urination, change in stools.     F/u   Stable at this time      Seizures   Was seeing a neurologist   MRI was ordered -patient is willing to get the MRI done now. Will order back in October but patient is declining at that time. tati is taking the keppra   Per , patient and that has been hearing voices and seeing things. Has been going on for the last month. Was unsure if is related to dementia.     Anemia  Mix of iron deficiency anemia and history of GI bleeds. Please read the last hospital encounter     Wayne Alvarez a 67 y. o. female that was admitted and treated at [facility] for increasing lethargy and fatigue in the setting of chronic GI bleed.  Patient has had multiple endoscopic evaluations in the past including a PillCam which showed some bleeding throughout the distal small intestine.  She has been treated also has advancing CKD 3 which is likely contributing to her anemia at some point as well.  She was recently admitted to [facility] with cardiac arrest and has been successfully discharged home.  At this point in time she was transfused to a hemoglobin of 9.1 and she will be discharged with hematology follow-up. [facility] line was placed as no one was able to place an IV in her while on admission this will be removed prior to discharge. Luis Rivers will follow up with hematology for hospital erythropoietin versus iron versus blood transfusions as needed.  She also follows with nephrology due to her CKD.  Of note the patient did have mild Trop elevation on arrival which is chronic and baseline and troponin's have been flat with no signs of acute coronary events.     Patient was seen by hematology. Marybel was told that it was too soon for patient to have any iron transfusion/blood transfusions since she just had one in the hospital. Patient was to have blood work done in may and then repeated 2 weeks afterwards. Family is concerned because  Patient has a history where her blood count drops every month due to the chronic bleed in her GI system and she needs to be admitted to the hospital.family would like to see if there is a way where her blood count can be checked monthly or where she can be evaluated more frequently through other measures. Per , patient was seen by palliative care last week. From their understanding, they would resume all care and will not need any help from the specialist.  Family wants all of the care teams involved. Patient denies any fevers, chills, nausea, vomiting, chest pain, change in her shortness of breath, change in her stools.      Follow-up  Patient is doing much better. Patient has her blood checked routinely. And has been established with hematology. Patient also follows up with gastroenterology. Per family, since she has been with palliative care she has been able to relate any concerns with them.     Follow  Family reports that her blood count had dropped when she was at her most recent appointment. Denies any symptoms or concerns at this time. But patient is worried that if it drops any further that she will end up in the hospital.  Patient does not see palliative medicine until 1 month from now.     F/u   Stable at this time. Patient has been having hallucinations         Colonization of ESBL E. coli     Patient was recently treated for urinary tract infection by her primary care provider.   At that time patient was given antibiotics and was told to have a repeat urinary culture for further evaluation. Patient also has chronic abdominal pain that was seen and had a CT scan in the hospital.  Patient is a follow-up with her hematologist in June.     Follow-up  Was evaluated by infectious disease. Stated that patient does not need any antibiotics or anything at this time as she is likely been colonized with the bacteria. Currently patient has no issues or concerns. Denies any fevers, chills, nausea, vomiting, chest pain, shortness of breath, abdominal pain, urination, changes in stools.      Follow  Stable.       Review of Systems    Prior to Visit Medications    Medication Sig Taking?  Authorizing Provider   nitrofurantoin (MACRODANTIN) 50 MG capsule Take 1 capsule by mouth nightly Yes Devendra Unger MD   amLODIPine (NORVASC) 5 MG tablet Take 1 tablet by mouth daily Yes Stephie Schultz MD   ACCU-CHEK PHYLLIS PLUS strip Test 3x daily Dx E11.65 Yes DIANE Thompson   glucose 4 g chewable tablet Take 4 tablets by mouth as needed for Low blood sugar Yes DIANE Thompson   Glucagon (GVOKE HYPOPEN 2-PACK) 1 MG/0.2ML SOAJ Inject 1 mg into the skin every 15 minutes as needed (glucose less tahtn 70 or if symptomatic) Yes DIANE Thompson   Continuous Blood Gluc Sensor (DEXCOM G6 SENSOR) MISC 1 Device by Does not apply route continuous Yes DIANE Thompson   Continuous Blood Gluc Transmit (DEXCOM G6 TRANSMITTER) MISC 1 Device by Does not apply route continuous Yes IDANE Thompson   Continuous Blood Gluc  (DEXCOM G6 ) [place] 1 Device by Does not apply route 4 times daily (before meals and nightly) Yes DIANE Thompson   insulin glargine (LANTUS) 100 UNIT/ML injection vial 70 units at bedtime Yes DIANE Thompson   insulin lispro (HUMALOG) 100 UNIT/ML injection vial 24 units injected three times daily before meals Yes DIANE Thompson   zoster recombinant adjuvanted vaccine [facility]) 50 MCG/0.5ML SUSR injection Inject 0.5 mLs into the muscle See Admin Instructions 1 dose now and repeat in 2-6 months Yes Lesa Frey MD   digoxin (LANOXIN) 125 MCG tablet Take 1 tablet by mouth every other day Yes Edison Art MD   albuterol-ipratropium (COMBIVENT RESPIMAT)  MCG/ACT AERS inhaler Inhale 1 puff into the lungs every 6 hours as needed for Wheezing or Shortness of Breath Yes Lesa Frey MD   divalproex (DEPAKOTE) 500 MG DR tablet Take 1 tablet by mouth 2 times daily Yes Kenn Many APRN - CNP   ipratropium-albuterol (DUONEB) 0.5-2.5 (3) MG/3ML SOLN nebulizer solution Inhale 3 mLs into the lungs 3 times daily Yes Lesa Frey MD   Cranberry 500 MG CAPS Take 500 mg by mouth daily Yes Historical Provider, MD   pantoprazole (PROTONIX) 40 MG tablet Take 1 tablet by mouth every morning (before breakfast) Yes Theador Dub, APRN - CNP   Cholecalciferol (VITAMIN D3) 50 MCG (2000 UT) CAPS Take 2,000 Units by mouth Daily Yes Historical Provider, MD   PARoxetine (PAXIL) 40 MG tablet TAKE 1 TABLET BY MOUTH ONCE DAILY IN THE MORNING Yes Lesa Frey MD   lidocaine-prilocaine (EMLA) 2.5-2.5 % cream APPLY CREAM TOPICALLY TO PORT SITE ONE HOUR PRIOR TO APPOINTMENT AS NEEDED Yes Historical Provider, MD   Handicap Placard MISC by Does not apply route Good from 7/15/21 - 7/15/26 Yes Lesa Frey MD   rosuvastatin (CRESTOR) 40 MG tablet TAKE 1 TABLET BY MOUTH ONCE DAILY IN THE EVENING Yes Lesa Frey MD   carvedilol (COREG) 25 MG tablet Take 1.5 tablets by mouth 2 times daily (with meals) Yes Edison Art MD   Accu-Chek Softclix Lancets MISC bid Yes Arpan Ca MD   Blood Glucose Monitoring Suppl (ACCU-CHEK PHYLLIS CONNECT) w/Device KIT 1 kit by Does not apply route daily Yes Arpan Ca MD       Social History     Tobacco Use    Smoking status: Former Smoker     Packs/day: 1.00     Years: 59.00     Pack years: 59.00     Types: Cigarettes     Start date: 11/30/2017     Quit date: 10/1/2020 Years since quittin.3    Smokeless tobacco: Never Used   Vaping Use    Vaping Use: Never used   Substance Use Topics    Alcohol use: Not Currently    Drug use: No        Allergies   Allergen Reactions    Ibuprofen Nausea Only    Metformin And Related      Diarrhea      Darvon [Propoxyphene Hcl] Nausea And Vomiting   ,   Past Medical History:   Diagnosis Date    Adenomatous polyp of sigmoid colon     Adenomatous polyp of transverse colon     Anxiety     Asthma     dx 2019 / has smoked since age 11    Cardiopulmonary arrest ([address]     CHF (congestive heart failure) (HCC)     Chronic back pain     Bilateral L5 S1 Radic on emg--surprisingly worse on the left than the right--pt's symptoms and her MRI show worse on the right    Chronic obstructive pulmonary disease with acute exacerbation ([address] 10/12/2019    Depression     ESBL E. coli carrier     Carrier.  Fibromyalgia     Gastrointestinal hemorrhage 2020    Hyperlipidemia     meds > 8 yrs    Hypertension     meds > 45 yrs    Insomnia 2013    On home O2     2l per n/c at bedtime mostly,     Osteoarthritis     Seizures ([address]     Sepsis ([address] 10/6/2020    Smoker 2013    Type II diabetes mellitus, uncontrolled ([address]     hx > 8 yrs    Unspecified sleep apnea    ,   Past Surgical History:   Procedure Laterality Date    BACK SURGERY  2017    lumbar disc    CARDIAC CATHETERIZATION  2014    DR. MIRELES / no stents    COLONOSCOPY  2016    w/polypectomy     COLONOSCOPY N/A 2020    COLONOSCOPY WITH POLYPECTOMY performed by Valentin Flannery MD at [facility] N/A 10/07/2019    EUA HYSTEROSCOPY DILATATION AND CURETTAGE performed by Marlys Davis DO at [facility]. [address], COLON, DIAGNOSTIC      EYE SURGERY      [place] with IOL OU / [address]    umbilical hernia repair    IR PORT PLACEMENT EQUAL OR GREATER THAN 5 YEARS  2021 IR PORT PLACEMENT EQUAL OR GREATER THAN 5 YEARS 2021 MLOZ SPECIAL PROCEDURE    UT ESOPHAGOGASTRODUODENOSCOPY TRANSORAL DIAGNOSTIC N/A 2017    EGD ESOPHAGOGASTRODUODENOSCOPY performed by Jerry Ko MD at [facility] N/A 2018    negative findings    UT REVISE MEDIAN N/CARPAL TUNNEL SURG Left 2017    LEFT  CARPAL TUNNEL RELEASE performed by July Womack MD at [facility],4+N/D,RJOQV N/A 2018    TONSILLECTOMY      as child    TUNNELED VENOUS PORT PLACEMENT Right 2021    8 Fr [employer] Port ClearVUE by Dr. Lia Mtz  2016    w/bx     UPPER GASTROINTESTINAL ENDOSCOPY N/A 2020    EGD possible biopsy performed by Jagruti Mccall MD at [address] N/A 2020    EGD PUSH ENTEROSCOPY performed by Claudia Ramires MD at [facility]   ,   Social History     Tobacco Use    Smoking status: Former Smoker     Packs/day: 1.00     Years: 59.00     Pack years: 59.00     Types: Cigarettes     Start date: 2017     Quit date: 10/1/2020     Years since quittin.3    Smokeless tobacco: Never Used   Vaping Use    Vaping Use: Never used   Substance Use Topics    Alcohol use: Not Currently    Drug use: No   ,   Family History   Problem Relation Age of Onset    Heart Disease Father         cardiac bypass    Arthritis Father     Arthritis Mother     Other Mother          at age 80    Other Sister         Health system health     No Known Problems Daughter     Stroke Son    ,   Immunization History   Administered Date(s) Administered    COVID-19[place]olina, Primary or Immunocompromised, PF, 100mcg/0.5mL 2021, 2021    Influenza Vaccine, unspecified formulation 2015    Influenza, High Dose (Fluzone 65 yrs and older) 10/12/2016, 10/23/2017    Influenza, High-dose, Quadv, 65 yrs +, IM (Fluzone) 09/17/2020, 10/01/2021    Influenza, Quadv, IM, (6 mo and older Fluzone, Flulaval, Fluarix and 3 yrs and older Afluria) 09/11/2015    Influenza, Quadv, IM, PF (6 mo and older Fluzone, Flulaval, Fluarix, and 3 yrs and older Afluria) 10/16/2019    Pneumococcal Conjugate 13-valent ([id number]) 10/23/2017    Pneumococcal Polysaccharide ([id number]) 11/01/2010, 09/17/2020    Td (Adult), 5 Lf Tetanus Toxoid, Pf (Tenivac, Decavac) 01/20/2011    Td, unspecified formulation 01/20/2011   ,   Health Maintenance   Topic Date Due    Shingles Vaccine (1 of 2) Never done    DEXA (modify frequency per FRAX score)  Never done    DTaP/Tdap/Td vaccine (1 - Tdap) 01/21/2011    Lipid screen  11/30/2021    COVID-19 Vaccine (3 - Booster for Hermilo Helen series) 01/17/2022    Annual Wellness Visit (AWV)  09/14/2022    Depression Monitoring  11/15/2022    Potassium monitoring  01/14/2023    Creatinine monitoring  01/14/2023    Colorectal Cancer Screen  09/29/2023    Flu vaccine  Completed    Pneumococcal 65+ yrs at Risk Vaccine  Completed    Hepatitis A vaccine  Aged Out    Hib vaccine  Aged Out    Meningococcal (ACWY) vaccine  Aged Out    Hepatitis C screen  Discontinued    Low dose CT lung screening  Discontinued       PHYSICAL EXAMINATION:  [ INSTRUCTIONS:  \"[x]\" Indicates a positive item  \"[]\" Indicates a negative item  -- DELETE ALL ITEMS NOT EXAMINED]  Vital Signs: (As obtained by patient/caregiver or practitioner observation)    Blood pressure-  Heart rate-    Respiratory rate-    Temperature-  Pulse oximetry-     Constitutional: [x] Appears well-developed and well-nourished [x] No apparent distress      [] Abnormal-   Mental status  [x] Alert and awake  [] Oriented to person/place/time []Able to follow commands      Eyes:  EOM    [x]  Normal  [] Abnormal-  Sclera  [x]  Normal  [] Abnormal -         Discharge []  None visible  [] Abnormal -    HENT:   [x] Normocephalic, atraumatic.   [] Abnormal   [] Mouth/Throat: Mucous membranes are moist.     External Ears [x] Normal  [] Abnormal-     Neck: [x] No visualized mass     Pulmonary/Chest: [x] Respiratory effort normal.  [] No visualized signs of difficulty breathing or respiratory distress        [] Abnormal-      Musculoskeletal:   [x] Normal gait with no signs of ataxia         [] Normal range of motion of neck        [] Abnormal-       Neurological:        [x] No Facial Asymmetry (Cranial nerve 7 motor function) (limited exam to video visit)          [] No gaze palsy        [] Abnormal-         Skin:        [x] No significant exanthematous lesions or discoloration noted on facial skin         [] Abnormal-            Psychiatric:       [x] Normal Affect [] No Hallucinations        [] Abnormal-     Other pertinent observable physical exam findings-     ASSESSMENT/PLAN:    1. Major depressive disorder, recurrent, in full remission ([address]    Stable     2. Chronic kidney disease, stage 4 (severe) (HCC)  F/u with renal     3. Chronic obstructive pulmonary disease, unspecified COPD type ([address]    F/u with pulmonary y    4. HOCM (hypertrophic obstructive cardiomyopathy) ([address]  F/u with cardiology       6. Chronic combined systolic and diastolic CHF (congestive heart failure) ([address]      7. Seizures ([address]      8. Spinal stenosis of lumbar region with neurogenic claudication  Stable     9. Fibromyalgia      10. Pure hypercholesterolemia    11. Gastrointestinal hemorrhage, unspecified gastrointestinal hemorrhage type  F/u with GI     12. Essential hypertension  Stable     hallucinations   Patient to have MRI   F/u with neurology     Return in about 3 months (around 5/17/2022). Nic Hendricks, was evaluated through a synchronous (real-time) audio-video encounter. The patient (or guardian if applicable) is aware that this is a billable service, which includes applicable co-pays. This Virtual Visit was conducted with patient's (and/or legal guardian's) consent.  The visit was conducted pursuant to the emergency declaration under the [facility], [facility] authority and the [employer] General Act. Patient identification was verified, and a caregiver was present when appropriate. The patient was located at home in a state where the provider was licensed to provide care. Total time spent on this encounter: 15 minutes    --Marvin Lockhart MD on 2/17/2022 at 11:48 AM    An electronic signature was used to authenticate this note.

## 2022-02-21 ENCOUNTER — OFFICE VISIT (OUTPATIENT)
Dept: PALLATIVE CARE | Age: 77
End: 2022-02-21
Payer: MEDICARE

## 2022-02-21 ENCOUNTER — HOSPITAL ENCOUNTER (OUTPATIENT)
Dept: INFUSION THERAPY | Age: 77
Setting detail: INFUSION SERIES
Discharge: HOME | End: 2022-02-21
Payer: MEDICARE

## 2022-02-21 VITALS
RESPIRATION RATE: 18 BRPM | TEMPERATURE: 98.2 F | SYSTOLIC BLOOD PRESSURE: 118 MMHG | DIASTOLIC BLOOD PRESSURE: 61 MMHG | HEART RATE: 78 BPM

## 2022-02-21 VITALS
TEMPERATURE: 98.6 F | DIASTOLIC BLOOD PRESSURE: 78 MMHG | HEART RATE: 58 BPM | SYSTOLIC BLOOD PRESSURE: 101 MMHG | OXYGEN SATURATION: 96 %

## 2022-02-21 DIAGNOSIS — I50.42 CHRONIC COMBINED SYSTOLIC AND DIASTOLIC CHF (CONGESTIVE HEART FAILURE) (HCC): ICD-10-CM

## 2022-02-21 DIAGNOSIS — J44.9 CHRONIC OBSTRUCTIVE PULMONARY DISEASE, UNSPECIFIED COPD TYPE (HCC): Primary | ICD-10-CM

## 2022-02-21 DIAGNOSIS — N39.0 CHRONIC UTI: ICD-10-CM

## 2022-02-21 DIAGNOSIS — R53.83 OTHER FATIGUE: ICD-10-CM

## 2022-02-21 DIAGNOSIS — Z95.828 PORT-A-CATH IN PLACE: Primary | ICD-10-CM

## 2022-02-21 DIAGNOSIS — Z51.5 PALLIATIVE CARE ENCOUNTER: ICD-10-CM

## 2022-02-21 DIAGNOSIS — R56.9 SEIZURES (HCC): ICD-10-CM

## 2022-02-21 DIAGNOSIS — E11.65 UNCONTROLLED TYPE 2 DIABETES MELLITUS WITH HYPERGLYCEMIA (HCC): ICD-10-CM

## 2022-02-21 DIAGNOSIS — R45.86 EMOTIONAL LABILITY: ICD-10-CM

## 2022-02-21 DIAGNOSIS — R44.0 AUDITORY HALLUCINATIONS: ICD-10-CM

## 2022-02-21 DIAGNOSIS — R31.29 OTHER MICROSCOPIC HEMATURIA: ICD-10-CM

## 2022-02-21 PROCEDURE — G8417 CALC BMI ABV UP PARAM F/U: HCPCS | Performed by: NURSE PRACTITIONER

## 2022-02-21 PROCEDURE — 1090F PRES/ABSN URINE INCON ASSESS: CPT | Performed by: NURSE PRACTITIONER

## 2022-02-21 PROCEDURE — 1036F TOBACCO NON-USER: CPT | Performed by: NURSE PRACTITIONER

## 2022-02-21 PROCEDURE — 1123F ACP DISCUSS/DSCN MKR DOCD: CPT | Performed by: NURSE PRACTITIONER

## 2022-02-21 PROCEDURE — 96523 IRRIG DRUG DELIVERY DEVICE: CPT

## 2022-02-21 PROCEDURE — 4040F PNEUMOC VAC/ADMIN/RCVD: CPT | Performed by: NURSE PRACTITIONER

## 2022-02-21 PROCEDURE — G8484 FLU IMMUNIZE NO ADMIN: HCPCS | Performed by: NURSE PRACTITIONER

## 2022-02-21 PROCEDURE — 6360000002 HC RX W HCPCS: Performed by: NURSE PRACTITIONER

## 2022-02-21 PROCEDURE — 99349 HOME/RES VST EST MOD MDM 40: CPT | Performed by: NURSE PRACTITIONER

## 2022-02-21 RX ORDER — HEPARIN SODIUM (PORCINE) LOCK FLUSH IV SOLN 100 UNIT/ML 100 UNIT/ML
500 SOLUTION INTRAVENOUS PRN
Status: DISCONTINUED | OUTPATIENT
Start: 2022-02-21 | End: 2022-02-22 | Stop reason: HOSPADM

## 2022-02-21 RX ORDER — HEPARIN SODIUM (PORCINE) LOCK FLUSH IV SOLN 100 UNIT/ML 100 UNIT/ML
500 SOLUTION INTRAVENOUS PRN
Status: CANCELLED | OUTPATIENT
Start: 2022-03-07

## 2022-02-21 RX ORDER — PAROXETINE HYDROCHLORIDE 40 MG/1
TABLET, FILM COATED ORAL
Qty: 30 TABLET | Refills: 0 | Status: SHIPPED | OUTPATIENT
Start: 2022-02-21 | End: 2022-03-29

## 2022-02-21 RX ORDER — SODIUM CHLORIDE 0.9 % (FLUSH) 0.9 %
5-40 SYRINGE (ML) INJECTION PRN
Status: CANCELLED | OUTPATIENT
Start: 2022-03-07

## 2022-02-21 RX ADMIN — HEPARIN 500 UNITS: 100 SYRINGE at 11:38

## 2022-02-21 ASSESSMENT — ENCOUNTER SYMPTOMS
NAUSEA: 0
TROUBLE SWALLOWING: 0
WHEEZING: 0
CONSTIPATION: 0
BACK PAIN: 0
DIARRHEA: 0
COUGH: 0
VOICE CHANGE: 0
ABDOMINAL DISTENTION: 0
BLOOD IN STOOL: 0
SHORTNESS OF BREATH: 1

## 2022-02-21 NOTE — TELEPHONE ENCOUNTER
Patient requesting medication refill.  Please approve or deny this request.    Rx requested:  Requested Prescriptions     Pending Prescriptions Disp Refills    PARoxetine (PAXIL) 40 MG tablet 90 tablet 1         Last Office Visit:   2/17/2022      Next Visit Date:  Future Appointments   Date Time Provider Zaid Lr   2/28/2022  6:00 PM [facility] ROOM 2 [facility] RAD   3/23/2022  1:00 PM Syed Vicente, APRN - CNP [facility]   5/6/2022 11:30 AM Abel Calle MD [facility]   5/20/2022  1:30 PM [address] Nova Zhang MD [address] [place]

## 2022-02-21 NOTE — FLOWSHEET NOTE
Port flush performed, patient tolerated well. Left unit by mayra briseno spouse assistance. All equipment used in the care for this patient has been cleaned.

## 2022-02-21 NOTE — PROGRESS NOTES
Subjective:      Patient Id: Aletha Ramírez was seen at  home in [facility] for palliative medicine follow-up. She was accompanied to the appointment by: spouse, Beny Curran. Chief Complaint   Patient presents with    Hallucinations      HPI       Charles Salcido is a 67 y.o. female with a complex medical history that includes GI bleeding throughout the distal small intestine, anxiety, asthma, CHF, COPD, CKD depression, fibromyalgia, OA, cardiac arrest, seizures, DM II, and sleep apnea. Home visit is necessary in lieu of office due to significant frailty and high symptom burden from comorbid illnesses. Patient is alert and sitting up on side of couch. Patient has ongoing SOB with exertion at baseline. No cough. Occasional wheezing. Has been using breathing treatments one to two times per day. Blood sugars running between high 100's and 200's. She only likes to eat one meal per day with snacks. She has been supplementing with boost occasionally. Patient reports not always taking her insulin because she doesn't always feel like eating. Has lost some weight. Was in the upper 150's now about 151 lbs. Plan is for MRI on Monday. Patient has been having issues where she has been having auditory hallucinations for several weeks (reports this was going on prior to starting [address]). No other new medications added to her regimen. Spouse showed me a video of patient prior to bedtime where she appears to be hallucinating. She also has periods where she mumbles words and doesn't make sense. Patient reports that hallucinations also happen during the day. She also continues to have emotional lability. No suicidal thoughts or ideation. Patient met with urology regarding UTIs and hematuria. Urology feels like the urinary tract infections are related to ongoing elevated blood sugars per patient. Patient was started on daily prophylactic macrobid. Has to follow-up in 3 months. No leg swelling/fluid retention.  No chest pain. She has persistent fatigue. No pain. Patient has been participating in more ADLs such as cooking. Past Medical History:   Diagnosis Date    Adenomatous polyp of sigmoid colon     Adenomatous polyp of transverse colon     Anxiety     Asthma     dx 2019 / has smoked since age 11    Cardiopulmonary arrest ([address]     CHF (congestive heart failure) (HCC)     Chronic back pain     Bilateral L5 S1 Radic on emg--surprisingly worse on the left than the right--pt's symptoms and her MRI show worse on the right    Chronic obstructive pulmonary disease with acute exacerbation ([address] 10/12/2019    Depression     ESBL E. coli carrier     Carrier.  Fibromyalgia     Gastrointestinal hemorrhage 2/24/2020    Hyperlipidemia     meds > 8 yrs    Hypertension     meds > 45 yrs    Insomnia 12/4/2013    On home O2     2l per n/c at bedtime mostly,     Osteoarthritis     Seizures ([address]     Sepsis ([address] 10/6/2020    Smoker 6/18/2013    Type II diabetes mellitus, uncontrolled ([address]     hx > 8 yrs    Unspecified sleep apnea      Past Surgical History:   Procedure Laterality Date    BACK SURGERY  2017    lumbar disc    CARDIAC CATHETERIZATION  11/03/2014    DR. MIRELES / no stents    COLONOSCOPY  08/29/2016    w/polypectomy     COLONOSCOPY N/A 09/29/2020    COLONOSCOPY WITH POLYPECTOMY performed by Coral Guo MD at [facility] N/A 10/07/2019    EUA HYSTEROSCOPY DILATATION AND CURETTAGE performed by Jacqueline Juan DO at [facility]. [address], COLON, DIAGNOSTIC      EYE SURGERY      [place] with IOL OU / [address] [place]  [address]    umbilical hernia repair    IR PORT PLACEMENT EQUAL OR GREATER THAN 5 YEARS  5/14/2021    IR PORT PLACEMENT EQUAL OR GREATER THAN 5 YEARS 5/14/2021 [facility] SPECIAL PROCEDURE    DC ESOPHAGOGASTRODUODENOSCOPY TRANSORAL DIAGNOSTIC N/A 03/24/2017    EGD ESOPHAGOGASTRODUODENOSCOPY performed by Dank Cruz MD at [facility]    [place] LAP,DIAGNOSTIC ABDOMEN N/A 2018    negative findings    [place] REVISE MEDIAN N/CARPAL TUNNEL SURG Left 2017    LEFT  CARPAL TUNNEL RELEASE performed by Gato Denise MD at [facility] GBZQ,6+E/H,NEBPX N/A 2018    TONSILLECTOMY      as child    TUNNELED VENOUS PORT PLACEMENT Right 2021    8 [employer] Port ClearVUE by Dr. David Martinez  2016    w/bx     UPPER GASTROINTESTINAL ENDOSCOPY N/A 2020    EGD possible biopsy performed by Silvina Gerardo MD at [address] [place] [address] N/A 2020    EGD PUSH ENTEROSCOPY performed by Aminta Hernandez MD at [facility] Marital status:      Spouse name: Bladimir Wall Number of children: 2    Years of education: 12    Highest education level: High school graduate   Occupational History    Occupation: Retired-   Tobacco Use    Smoking status: Former Smoker     Packs/day: 1.00     Years: 59.00     Pack years: 59.00     Types: Cigarettes     Start date: 2017     Quit date: 10/1/2020     Years since quittin.3    Smokeless tobacco: Never Used   Vaping Use    Vaping Use: Never used   Substance and Sexual Activity    Alcohol use: Not Currently    Drug use: No    Sexual activity: Yes     Partners: Male   Other Topics Concern    Not on file   Social History Narrative    Grew up in [address] [place] [address] With:  Librado Spouse    Type of Home: House    Home Layout: Two level, Performs ADL's on one level    Home Access: Stairs to enter with rails    Entrance Stairs - Number of Steps: 4    Bathroom Shower/Tub: Tub/Shower unit, Doors    Bathroom Equipment: Shower chair, Grab bars in [place]: Rolling walker, Cane, Oxygen(uses her O2 very seldom)    ADL Assistance: Needs assistance    Homemaking Assistance: Needs assistance    Homemaking Responsibilities: No(spouse performs)    Ambulation Assistance: Independent    Transfer Assistance: Independent    Active : No    Occupation: Retired    Type of occupation: worked in [employer]: patient enjoys [address] [facility] [place] Strain: Low Risk     Difficulty of Paying Living Expenses: Not hard at [employer] Insecurity: No Food Insecurity    Worried About [facility] in the Last Year: Never true    [address] in the Last Year: Never true   Transportation Needs:     Lack of Transportation (Medical): Not on file    Lack of Transportation (Non-Medical):  Not on file   Physical Activity:     Days of Exercise per Week: Not on file    Minutes of Exercise per Session: Not on file   Stress:     Feeling of Stress : Not on file   Social Connections:     Frequency of Communication with Friends and Family: Not on file    Frequency of Social Gatherings with Friends and Family: Not on file    Attends Sikh Services: Not on file    Active Member of [address] [place] [address] or Organizations: Not on file    Attends Club or Organization Meetings: Not on file    Marital Status: Not on file   Intimate Partner Violence:     Fear of Current or Ex-Partner: Not on file    Emotionally Abused: Not on file    Physically Abused: Not on file    Sexually Abused: Not on file   Housing Stability:     Unable to Pay for Housing in the Last Year: Not on file    Number of [place] in the Last Year: Not on file    Unstable Housing in the Last Year: Not on file     Family History   Problem Relation Age of Onset    Heart Disease Father         cardiac bypass    Arthritis Father     Arthritis Mother     Other Mother          at age 80    Other Sister         [facility]    No Known Problems Daughter     Stroke Son      Allergies   Allergen Reactions    Ibuprofen Nausea Only    Metformin And Related      Diarrhea      Darvon lb (66.2 kg)   09/02/21 156 lb 3.2 oz (70.9 kg)     Physical Exam  Constitutional:       General: She is not in acute distress. HENT:      Head: Normocephalic and atraumatic. Nose: No rhinorrhea. Eyes:      General: No scleral icterus. Right eye: No discharge. Left eye: No discharge. Extraocular Movements: Extraocular movements intact. Conjunctiva/sclera: Conjunctivae normal.   Cardiovascular:      Rate and Rhythm: Normal rate and regular rhythm. Pulmonary:      Effort: Pulmonary effort is normal.      Breath sounds: Normal breath sounds. No wheezing or rales. Abdominal:      General: Bowel sounds are normal. There is no distension. Palpations: Abdomen is soft. Tenderness: There is no abdominal tenderness. Musculoskeletal:      Cervical back: Normal range of motion and neck supple. Right lower leg: No edema. Left lower leg: No edema. Skin:     General: Skin is warm and dry. Neurological:      Mental Status: She is alert and oriented to person, place, and time. Mental status is at baseline. Psychiatric:         Mood and Affect: Mood normal.         Behavior: Behavior normal.         Assessment and Plan:      1. Chronic obstructive pulmonary disease, unspecified COPD type ([facility]  Stable. Continue COPD directed therapies. Call for increased SOB, cough, sputum production, excessive fatigue, fever, chills, or myalgias. Activity as tolerated. 2. Chronic combined systolic and diastolic CHF  Stable. Monitor weight daily, call if you gain 3 lbs in one day or 5 lbs. in one week or for increased edema, sob, cough, orthopnea, or chest pain. Patient follows with cardiology. 3. Seizures  4. Auditory hallucinations  Unsure regarding relationship between seizures and hallucinations. Please make appointment to discuss with neurology. Patient decided to have MRI of brain done and has a scheduled appointment.  We have discussed going to the ER multiple times in the past for neuro symptoms, however, patient does not want to go and this is inconsistent with her goals of care. Would recommend spouse check patient's sugar when these symptoms occur. 5. Uncontrolled type 2 DM with hyperglycemia  6. Other fatigue  Continue to follow with endocrine for close monitoring. Significantly elevated HgbA1c. Likely one of patient's underlying causes for fatigue. 7. Other microscopic hematuria  8. Chronic UTI  Continue to follow with urology. No current s/s of active UTI. On macrobid daily for prophylaxis as started by urology. 9. Emotional lability  No red flags. Currently on Paxil 40 mg po daily. May have underlying neuro component. Patient and spouse to discuss with neurology at next appointment. 10. Palliative care encounter  Discussed symptom management related to chronic disease/condition. Provided emotional support and active listening. Patient understands and is agreeable to current plan. Due to acuity, symptomatology and high-risk medication management, I advised patient to Return in about 1 month (around 3/21/2022). Total Time 50 minutes     Total time includes reviewing labs, reviewing history, medications, and allergies, counseling patient, performing medically appropriate evaluation and examination, and documenting in the medical record.        JOHNATHON Villa - CNP    Collaborating physician: Dr. Luci Stokes

## 2022-02-28 ENCOUNTER — HOSPITAL ENCOUNTER (OUTPATIENT)
Dept: MRI IMAGING | Age: 77
Discharge: HOME | End: 2022-03-02
Payer: MEDICARE

## 2022-02-28 DIAGNOSIS — R56.9 SEIZURE (HCC): ICD-10-CM

## 2022-02-28 PROCEDURE — 70551 MRI BRAIN STEM W/O DYE: CPT

## 2022-02-28 RX ORDER — BLOOD SUGAR DIAGNOSTIC
STRIP MISCELLANEOUS
Qty: 100 EACH | Refills: 3 | Status: SHIPPED | OUTPATIENT
Start: 2022-02-28 | End: 2022-06-01 | Stop reason: SDUPTHER

## 2022-02-28 NOTE — TELEPHONE ENCOUNTER
patient requesting medication refill.  Please approve or deny this request.    Rx requested:  Requested Prescriptions     Pending Prescriptions Disp Refills    ACCU-CHEK PHYLLIS PLUS strip 100 each 3     Sig: Test 3x daily Dx E11.65         Last Office Visit:   1/20/2022      Next Visit Date:  Future Appointments   Date Time Provider Zaid Adeline   2/28/2022  6:00 PM [facility] ROOM 2 [facility]   3/23/2022  1:00 PM Fidel Stein APRN - CNP [facility]   5/6/2022 11:30 AM Iza Sharma MD [facility]   5/20/2022  1:30 PM Ryan Rubin  [place]

## 2022-03-02 ENCOUNTER — TELEPHONE (OUTPATIENT)
Dept: NEUROLOGY | Age: 77
End: 2022-03-02

## 2022-03-02 NOTE — TELEPHONE ENCOUNTER
Pt would like to know results from MRI that was done. Could you please give her a call at [phone] to discuss this.

## 2022-03-02 NOTE — TELEPHONE ENCOUNTER
Reviewed MRI of the brain. Discussed findings with patient's  per patient's request.  Patient does have some evidence of gliosis and encephalomalacia and chronic microhemorrhages. She also has age-related changes. At this time we like her to continue on Depakote which has been reports she is still taking. We discussed that some of these changes and findings on the MRI can put her at risk for seizure activity as well as cognitive changes. She has had no recurrent seizure activity. I did discuss MRI with Dr. Colette Estrada who is going to review this further. Patient will need to have laboratory testing for monitoring of Depakote. Advised them that she will need follow-up appointment scheduled. We will send message to office staff to schedule her for follow-up.

## 2022-03-04 ENCOUNTER — TELEPHONE (OUTPATIENT)
Dept: NEUROLOGY | Age: 77
End: 2022-03-04

## 2022-03-07 ENCOUNTER — OFFICE VISIT (OUTPATIENT)
Dept: FAMILY MEDICINE CLINIC | Age: 77
End: 2022-03-07
Payer: MEDICARE

## 2022-03-07 ENCOUNTER — TELEPHONE (OUTPATIENT)
Dept: PHARMACY | Facility: CLINIC | Age: 77
End: 2022-03-07

## 2022-03-07 VITALS
TEMPERATURE: 97.6 F | HEIGHT: 59 IN | HEART RATE: 98 BPM | BODY MASS INDEX: 31.25 KG/M2 | WEIGHT: 155 LBS | DIASTOLIC BLOOD PRESSURE: 60 MMHG | SYSTOLIC BLOOD PRESSURE: 126 MMHG | OXYGEN SATURATION: 97 %

## 2022-03-07 DIAGNOSIS — I50.41 ACUTE COMBINED SYSTOLIC AND DIASTOLIC CHF, NYHA CLASS 1 (HCC): ICD-10-CM

## 2022-03-07 DIAGNOSIS — I42.1 HOCM (HYPERTROPHIC OBSTRUCTIVE CARDIOMYOPATHY) (HCC): ICD-10-CM

## 2022-03-07 DIAGNOSIS — E11.21 DIABETIC NEPHROPATHY ASSOCIATED WITH TYPE 2 DIABETES MELLITUS (HCC): ICD-10-CM

## 2022-03-07 DIAGNOSIS — M54.2 NECK PAIN: Primary | ICD-10-CM

## 2022-03-07 DIAGNOSIS — I50.42 CHRONIC COMBINED SYSTOLIC AND DIASTOLIC CHF (CONGESTIVE HEART FAILURE) (HCC): ICD-10-CM

## 2022-03-07 DIAGNOSIS — Q27.30 AVM (ARTERIOVENOUS MALFORMATION): ICD-10-CM

## 2022-03-07 PROCEDURE — G8400 PT W/DXA NO RESULTS DOC: HCPCS | Performed by: FAMILY MEDICINE

## 2022-03-07 PROCEDURE — G8484 FLU IMMUNIZE NO ADMIN: HCPCS | Performed by: FAMILY MEDICINE

## 2022-03-07 PROCEDURE — 4040F PNEUMOC VAC/ADMIN/RCVD: CPT | Performed by: FAMILY MEDICINE

## 2022-03-07 PROCEDURE — 1123F ACP DISCUSS/DSCN MKR DOCD: CPT | Performed by: FAMILY MEDICINE

## 2022-03-07 PROCEDURE — G8427 DOCREV CUR MEDS BY ELIG CLIN: HCPCS | Performed by: FAMILY MEDICINE

## 2022-03-07 PROCEDURE — 1036F TOBACCO NON-USER: CPT | Performed by: FAMILY MEDICINE

## 2022-03-07 PROCEDURE — 99214 OFFICE O/P EST MOD 30 MIN: CPT | Performed by: FAMILY MEDICINE

## 2022-03-07 PROCEDURE — 1090F PRES/ABSN URINE INCON ASSESS: CPT | Performed by: FAMILY MEDICINE

## 2022-03-07 PROCEDURE — G8417 CALC BMI ABV UP PARAM F/U: HCPCS | Performed by: FAMILY MEDICINE

## 2022-03-07 RX ORDER — OXYCODONE HYDROCHLORIDE AND ACETAMINOPHEN 5; 325 MG/1; MG/1
1 TABLET ORAL EVERY 12 HOURS PRN
Qty: 6 TABLET | Refills: 0 | Status: SHIPPED | OUTPATIENT
Start: 2022-03-07 | End: 2022-03-10

## 2022-03-07 RX ORDER — TRAMADOL HYDROCHLORIDE 50 MG/1
50 TABLET ORAL EVERY 12 HOURS PRN
Qty: 14 TABLET | Refills: 0 | Status: CANCELLED | OUTPATIENT
Start: 2022-03-07 | End: 2022-03-14

## 2022-03-07 NOTE — PROGRESS NOTES
Chief Complaint   Patient presents with    Results     Discuss MRI         HPI: Rena Fontanez 67 y.o. female presenting for       MRI   Reports that it is no changes   Admits to hearing voices at night - would want to see if it is medicaiton inducced     Neck pain  Patient reports he has been having increasing neck pain for the last couple weeks. Patient reports that she is unable to move her neck and has decreased range of motion due to the pain. Patient has tried Tylenol with no relief of symptoms. Patient is not a candidate for Aleve due to her renal function. Patient denies any fevers, chills, nausea, vomiting, chest pain, shortness of breath, abdominal pain, change nation, change stools. Patient is not had x-rays of the area. Seizures   Was seeing a neurologist   MRI was ordered -patient is willing to get the MRI done now. Will order back in October but patient is declining at that time. tati is taking the keppra   Per , patient and that has been hearing voices and seeing things. Has been going on for the last month. Was unsure if is related to dementia. Follow-up  Patient is following up with neurology.     Hallucinations   is complaining that patient has hallucinations during the night. Concerned that could be medication induced or medication related. Like to see the clinical pharmacist to see if any of her medications will need to be adjusted or changed due to her health issues. Review of Systems  Negative except for what is stated in HPI    Current Outpatient Medications   Medication Sig Dispense Refill    oxyCODONE-acetaminophen (PERCOCET) 5-325 MG per tablet Take 1 tablet by mouth every 12 hours as needed for Pain for up to 3 days. Intended supply: 3 days.  Take lowest dose possible to manage pain 6 tablet 0    insulin lispro (HUMALOG) 100 UNIT/ML injection vial INJECT 15 UNITS SUBCUTANEOUSLY WITH EACH MEAL 10 mL 3    ACCU-CHEK PHYLLIS PLUS strip Test 3x daily Dx E11.65 100 each 3    PARoxetine (PAXIL) 40 MG tablet TAKE 1 TABLET BY MOUTH ONCE DAILY IN THE MORNING 30 tablet 0    nitrofurantoin (MACRODANTIN) 50 MG capsule Take 1 capsule by mouth nightly 90 capsule 3    amLODIPine (NORVASC) 5 MG tablet Take 1 tablet by mouth daily 90 tablet 3    glucose 4 g chewable tablet Take 4 tablets by mouth as needed for Low blood sugar 60 tablet 3    Glucagon (GVOKE HYPOPEN 2-PACK) 1 MG/0.2ML SOAJ Inject 1 mg into the skin every 15 minutes as needed (glucose less tahtn 70 or if symptomatic) 2 each 3    Continuous Blood Gluc Sensor (DEXCOM G6 SENSOR) MISC 1 Device by Does not apply route continuous 3 each 11    Continuous Blood Gluc Transmit (DEXCOM G6 TRANSMITTER) MISC 1 Device by Does not apply route continuous 1 each 3    Continuous Blood Gluc  (DEXCOM G6 ) [place] 1 Device by Does not apply route 4 times daily (before meals and nightly) 1 each 0    insulin glargine (LANTUS) 100 UNIT/ML injection vial 70 units at bedtime 3 each 3    zoster recombinant adjuvanted vaccine (SHINGRIX) 50 MCG/0.5ML SUSR injection Inject 0.5 mLs into the muscle See Admin Instructions 1 dose now and repeat in 2-6 months 0.5 mL 1    digoxin (LANOXIN) 125 MCG tablet Take 1 tablet by mouth every other day 45 tablet 3    albuterol-ipratropium (COMBIVENT RESPIMAT)  MCG/ACT AERS inhaler Inhale 1 puff into the lungs every 6 hours as needed for Wheezing or Shortness of Breath 4 g 5    divalproex (DEPAKOTE) 500 MG DR tablet Take 1 tablet by mouth 2 times daily 60 tablet 2    ipratropium-albuterol (DUONEB) 0.5-2.5 (3) MG/3ML SOLN nebulizer solution Inhale 3 mLs into the lungs 3 times daily 360 mL 0    Cranberry 500 MG CAPS Take 500 mg by mouth daily      pantoprazole (PROTONIX) 40 MG tablet Take 1 tablet by mouth every morning (before breakfast) 90 tablet 3    Cholecalciferol (VITAMIN D3) 50 MCG (2000 UT) CAPS Take 2,000 Units by mouth Daily      lidocaine-prilocaine (EMLA) 2.5-2.5 % cream APPLY CREAM TOPICALLY TO PORT SITE ONE HOUR PRIOR TO APPOINTMENT AS NEEDED      Handicap Placard MISC by Does not apply route Good from 7/15/21 - 7/15/26 1 each 0    rosuvastatin (CRESTOR) 40 MG tablet TAKE 1 TABLET BY MOUTH ONCE DAILY IN THE EVENING 90 tablet 3    Accu-Chek Softclix Lancets MISC bid 100 each 3    Blood Glucose Monitoring Suppl (ACCU-CHEK PHYLLIS CONNECT) w/Device KIT 1 kit by Does not apply route daily 1 kit 0    carvedilol (COREG) 25 MG tablet Take 1.5 tablets by mouth 2 times daily (with meals) 270 tablet 2     No current facility-administered medications for this visit. Past Medical History:   Diagnosis Date    Adenomatous polyp of sigmoid colon     Adenomatous polyp of transverse colon     Anxiety     Asthma     dx 2019 / has smoked since age 11    Cardiopulmonary arrest ([address]     [facility]     Chronic back pain     Bilateral L5 S1 Radic on emg--surprisingly worse on the left than the right--pt's symptoms and her MRI show worse on the right    Chronic obstructive pulmonary disease with acute exacerbation ([address] 10/12/2019    Depression     ESBL E. coli carrier     Carrier.  Fibromyalgia     Gastrointestinal hemorrhage 2/24/2020    Hyperlipidemia     meds > 8 yrs    Hypertension     meds > 45 yrs    Insomnia 12/4/2013    On home O2     2l per n/c at bedtime mostly,     Osteoarthritis     Seizures ([address]     Sepsis ([address] 10/6/2020    Smoker 6/18/2013    Type II diabetes mellitus, uncontrolled ([address]     hx > 8 yrs    Unspecified sleep apnea         Past Surgical History:   Procedure Laterality Date    BACK SURGERY  2017    lumbar disc    CARDIAC CATHETERIZATION  11/03/2014    DR. MIRELES / no stents    COLONOSCOPY  08/29/2016    w/polypectomy     COLONOSCOPY N/A 09/29/2020    COLONOSCOPY WITH POLYPECTOMY performed by Saqib Alfonso [facility] at [facility] N/A 10/07/2019    EUA HYSTEROSCOPY DILATATION AND CURETTAGE performed by Meeta Summers DO at [facility]. [address], COLON, DIAGNOSTIC      EYE SURGERY      [place] with IOL OU / [address]    umbilical hernia repair    IR PORT PLACEMENT EQUAL OR GREATER THAN 5 YEARS  2021    IR PORT PLACEMENT EQUAL OR GREATER THAN 5 YEARS 2021 MLOZ SPECIAL PROCEDURE    SD ESOPHAGOGASTRODUODENOSCOPY TRANSORAL DIAGNOSTIC N/A 2017    EGD ESOPHAGOGASTRODUODENOSCOPY performed by Matilda Cheney MD at [facility] N/A 2018    negative findings    SD REVISE MEDIAN N/CARPAL TUNNEL SURG Left 2017    LEFT  CARPAL TUNNEL RELEASE [facility]    TONSILLECTOMY      as child    TUNNELED VENOUS PORT PLACEMENT Right 2021    8 Fr [employer] Port ClearVUE by Dr. Jj Ortiz  2016    w/bx     UPPER GASTROINTESTINAL ENDOSCOPY N/A 2020    EGD possible biopsy performed by Agustin Gutierrez MD at [address] [place] [address] N/A 2020    EGD PUSH ENTEROSCOPY performed by Xavier Arzate MD at [facility]        Family History   Problem Relation Age of Onset    Heart Disease Father         cardiac bypass    Arthritis Father     Arthritis Mother     Other Mother          at age 80    Other Sister         [facility] health     No Known Problems Daughter     Stroke Son         Social History     Socioeconomic History    Marital status:      Spouse name: Kandis Dennison Number of children: 2    Years of education: 15    Highest education level: High school graduate   Occupational History    Occupation: Retired-   Tobacco Use    Smoking status: Former Smoker     Packs/day: 1.00     Years: 59.00     Pack years: 59.00     Types: Cigarettes     Start date: 2017     Quit date: 10/1/2020     Years since quittin.4    Smokeless tobacco: Never Used   Vaping Use    Vaping Use: Never used   Substance and Sexual Activity    Alcohol use: Not Currently    Drug use: No    Sexual activity: Yes     Partners: Male   Other Topics Concern    Not on file   Social History Narrative    Grew up in [place]     Lives With:  [id number] Spouse    Type of Home: House    Home Layout: Two level, Performs ADL's on one level    Home Access: Stairs to enter with rails    Entrance Stairs - Number of Steps: 4    Bathroom Shower/Tub: Tub/Shower unit, Doors    Bathroom Equipment: Shower chair, Grab bars in [facility]: Rolling walker, Cane, Oxygen(uses her O2 very seldom)    ADL Assistance: Needs assistance    Homemaking Assistance: Needs assistance    Homemaking Responsibilities: No(spouse performs)    Ambulation Assistance: Independent    Transfer Assistance: Independent    Active : No    Occupation: Retired    Type of occupation: worked in [employer]: patient enjoys [address] [facility] [employer] Strain: Low Risk     Difficulty of Paying Living Expenses: Not hard at [facility] Insecurity: No Food Insecurity    Worried About [facility] in the Last Year: Never true    [facility] in the Last Year: Never true   Transportation Needs:     Lack of Transportation (Medical): Not on file    Lack of Transportation (Non-Medical):  Not on file   Physical Activity:     Days of Exercise per Week: Not on file    Minutes of Exercise per Session: Not on file   Stress:     Feeling of Stress : Not on file   Social Connections:     Frequency of Communication with Friends and Family: Not on file    Frequency of Social Gatherings with Friends and Family: Not on file    Attends Gnosticism Services: Not on file    Active Member of Clubs or Organizations: Not on file    Attends Club or Organization Meetings: Not on file    Marital Status: Not on file   Intimate Partner Violence:     Fear of Current or Ex-Partner: Not on file    Emotionally Abused: Not on file    Physically Abused: Not on file    Sexually Abused: Not on file   Housing Stability:     Unable to Pay for Housing in the Last Year: Not on file    Number of [place] in the Last Year: Not on file    Unstable Housing in the Last Year: Not on file        /60   Pulse 98   Temp 97.6 °F (36.4 °C) (Temporal)   Ht 4' 11\" (1.499 m)   Wt 155 lb (70.3 kg)   LMP  (LMP Unknown)   SpO2 97%   BMI 31.31 kg/m²        Physical Exam:    Physical Exam:  General Appearance: alert and oriented to person, place and time, well developed and well- nourished, in no acute distress  Skin: warm and dry, no rash or erythema  Head: normocephalic and atraumatic  Eyes: pupils equal, round, and reactive to light, extraocular eye movements intact, conjunctivae normal  ENT: tympanic membrane, external ear and ear canal normal bilaterally, nose without deformity, nasal mucosa and turbinates normal without polyps  Neck: supple and non-tender without mass, no thyromegaly or thyroid nodules, no cervical lymphadenopathy  Pulmonary/Chest: Distant heard. Cardiovascular: normal rate, regular rhythm, normal S1 and S2, no murmurs, rubs, clicks, or gallops, distal pulses intact, no carotid bruits  Abdomen:  No tenderness to palpation. Bowel sounds are present. Extremities: no cyanosis, clubbing or edema  Musculoskeletal: normal range of motion, no joint swelling, deformity or tenderness  Neurologic: reflexes normal and symmetric, no cranial nerve deficit, gait, coordination and speech normal  Pelvic examination: scant discharge noted. No bleeding noted.    Labs   TSH   Date Value Ref Range Status   08/27/2020 0.777 0.440 - 3.860 uIU/mL Final     TSH   Date Value Ref Range Status   11/30/2020 0.474 0.440 - 3.860 uIU/mL Final   10/05/2020 1.070 0.440 - 3.860 uIU/mL Final   10/13/2019 1.200 0.440 - 3.860 uIU/mL Final   05/03/2014 0.421 0.270 - 4.200 uIU/mL Final     Lab Results   Component Value Date     01/14/2022    K 4.0 01/14/2022     01/14/2022    CO2 22 01/14/2022    BUN 17 01/14/2022    CREATININE 2.18 (H) 01/14/2022    GLUCOSE 215 (H) 01/14/2022    CALCIUM 10.3 (H) 01/14/2022    PROT 6.7 10/13/2021    LABALBU 3.9 01/14/2022    BILITOT <0.2 10/13/2021    ALKPHOS 104 10/13/2021    AST 16 10/13/2021    ALT 15 10/13/2021    LABGLOM 21.8 (L) 01/14/2022    GFRAA 26.4 (L) 01/14/2022    GLOB 2.4 10/13/2021     Lab Results   Component Value Date    WBC 7.3 10/13/2021    HGB 11.3 (L) 10/13/2021    HCT 34.8 (L) 10/13/2021    MCV 88.1 10/13/2021     10/13/2021     Lab Results   Component Value Date    LABA1C 11.4 12/09/2021     No results found for: EAG      A/P: Huffman Mates 67 y.o. female presenting for     1. Neck pain  Not a candidate for NSAIDs. Will give short supply of percocet. Will not give tramadol given the history of seizures and patient is on paxil. Will get an xray for futher evalutation   - XR CERVICAL SPINE (4-5 VIEWS); Future  - Referral to Clinical Pharmacist  - oxyCODONE-acetaminophen (PERCOCET) 5-325 MG per tablet; Take 1 tablet by mouth every 12 hours as needed for Pain for up to 3 days. Intended supply: 3 days. Take lowest dose possible to manage pain  Dispense: 6 tablet; Refill: 0    2. Acute combined systolic and diastolic CHF, NYHA class 1 ([facility]    - Referral to Clinical Pharmacist    3. HOCM (hypertrophic obstructive cardiomyopathy) ([facility]    - Referral to Clinical Pharmacist    4. AVM (arteriovenous malformation)    - Referral to Clinical Pharmacist    5. Chronic combined systolic and diastolic CHF (congestive heart failure) ([facility]    - Referral to Clinical Pharmacist    6. Diabetic nephropathy associated with type 2 diabetes mellitus ([facility]    - Referral to Clinical Pharmacist    7. Hallucinations   At night   Will have a med review.  Will need to see if medications are renally and hepatically adjusted. Will have patient see the clinical pharmacist.     MRI   Reviewed the MRI in the clinic in detail. Patient did not have the final results of the image. First time reading. No changes from 2020. Will follow up with neurology    Please note, this report has been partially produced using speech recognition software  and may cause  and /or contain errors related to that system including grammar, punctuation and spelling as well as words and phrases that may seem inappropriate. If there are questions or concerns please feel free to contact me to clarify.

## 2022-03-07 NOTE — TELEPHONE ENCOUNTER
Received a referral: from Provider to review patients medications. Having hallucinations. Would like to see if medications are causing this. Patient has a lot of medical conditions. Called patient to schedule a time to speak with a pharmacist over the telephone. No answer left VM: Please contact us at  [phone] to schedule this appointment. Pharmacists are available Monday thru Friday 7:30 AM till 5:30 PM.    Raina Gardner [facility].    [address] [facility] free: [phone]

## 2022-03-08 NOTE — TELEPHONE ENCOUNTER
Incoming call received from the patient's . Requests a call tomorrow afternoon. Scheduled appointment at 12 pm on 3/9/22. Reminded  to have all of the patient's medications ready for review with the pharmacist. Will sign off.      Kush FordD, Ter[place] // Department, toll free [phone], option 1      For [facility] in place:  No   Recommendation Provided To: Patient/Caregiver: 1 via Telephone   Intervention Detail: Scheduled Appointment   Gap Closed?: Yes    Intervention Accepted By: Patient/Caregiver: 1   Time Spent (min): 15

## 2022-03-09 ENCOUNTER — TELEPHONE (OUTPATIENT)
Dept: PHARMACY | Facility: CLINIC | Age: 77
End: 2022-03-09

## 2022-03-09 ENCOUNTER — SCHEDULED TELEPHONE ENCOUNTER (OUTPATIENT)
Dept: PHARMACY | Facility: CLINIC | Age: 77
End: 2022-03-09

## 2022-03-09 NOTE — TELEPHONE ENCOUNTER
Todd Lopez MD,    I recently completed a medication review with this patient's  and daughter at the request of her PCP. I had some information I wanted to send as an FYI  · Her daughter and  said that they had stopped giving her Depakote earlier this week. They feel that she is doing much better off of it. I let them know I would send you a quick message. I also encouraged them to reach out to discuss since it looks like she was instructed to continue after her recent MRI    Let me know if you have any questions or concerns. Thank you.     Dolly Roberts, PharmD, [address] [place] [address]  Department, toll free: [phone]

## 2022-03-09 NOTE — TELEPHONE ENCOUNTER
Raciel Self MD, medication review completed with patient: There are a few things to note  · I am wondering if severe BG elevations along with polydipsia and polyuria are leading to the hallucinations. Patient's fasting BG readings are in the 200's and I suspect she spikes as high as 400-500 in the evening. Likely due to diet noncompliance as she is getting her doses of insulin. I encouraged  to keep a better eye on her snacking/juice intake. He is also going to try to get her to wear Dexcom so they have a better idea of where she is running throughout the day and night. · Nitrofurantoin is technically contraindicated when GFR <30m/min. This was started as a preventative med by Urology. All other medications look to be at reasonable doses given kidney function  · Patient stopped Depakote this past week on her own. Neuro instructed to continue. I will make them aware    See note below for complete details. Please let me know if you have any questions. I am going to reach out to her urologist and neurologist about the 2 issues I mentioned above    Thank you,  JB Miller, PharmD, [address] [place] [address]  Department, toll free: [phone]      =======================================================    CLINICAL PHARMACY NOTE - Medication Review  Patient outreach to review medications - Spoke with daughter and . SUBJECTIVE/OBJECTIVE:   Charles Salcido is a 67 y.o. female referred to a clinical pharmacy specialist by provider and/or given their history of CKD and hallucinations. Medications:  Current Outpatient Medications   Medication Sig Dispense Refill    oxyCODONE-acetaminophen (PERCOCET) 5-325 MG per tablet Take 1 tablet by mouth every 12 hours as needed for Pain for up to 3 days. Intended supply: 3 days.  Take lowest dose possible to manage pain 6 tablet 0    insulin lispro (HUMALOG) 100 UNIT/ML injection vial INJECT 15 UNITS SUBCUTANEOUSLY WITH EACH MEAL 10 mL 3    ACCU-CHEK PHYLLIS PLUS strip Test 3x daily Dx E11.65 100 each 3    PARoxetine (PAXIL) 40 MG tablet TAKE 1 TABLET BY MOUTH ONCE DAILY IN THE MORNING 30 tablet 0    nitrofurantoin (MACRODANTIN) 50 MG capsule Take 1 capsule by mouth nightly 90 capsule 3    amLODIPine (NORVASC) 5 MG tablet Take 1 tablet by mouth daily 90 tablet 3    glucose 4 g chewable tablet Take 4 tablets by mouth as needed for Low blood sugar 60 tablet 3    Glucagon (GVOKE HYPOPEN 2-PACK) 1 MG/0.2ML SOAJ Inject 1 mg into the skin every 15 minutes as needed (glucose less tahtn 70 or if symptomatic) 2 each 3    insulin glargine (LANTUS) 100 UNIT/ML injection vial 70 units at bedtime 3 each 3    digoxin (LANOXIN) 125 MCG tablet Take 1 tablet by mouth every other day 45 tablet 3    albuterol-ipratropium (COMBIVENT RESPIMAT)  MCG/ACT AERS inhaler Inhale 1 puff into the lungs every 6 hours as needed for Wheezing or Shortness of Breath 4 g 5    ipratropium-albuterol (DUONEB) 0.5-2.5 (3) MG/3ML SOLN nebulizer solution Inhale 3 mLs into the lungs 3 times daily 360 mL 0    Cranberry 500 MG CAPS Take 500 mg by mouth daily      pantoprazole (PROTONIX) 40 MG tablet Take 1 tablet by mouth every morning (before breakfast) 90 tablet 3    Cholecalciferol (VITAMIN D3) 50 MCG (2000 UT) CAPS Take 2,000 Units by mouth 2 times daily       lidocaine-prilocaine (EMLA) 2.5-2.5 % cream APPLY CREAM TOPICALLY TO PORT SITE ONE HOUR PRIOR TO APPOINTMENT AS NEEDED      Handicap Placard MISC by Does not apply route Good from 7/15/21 - 7/15/26 1 each 0    rosuvastatin (CRESTOR) 40 MG tablet TAKE 1 TABLET BY MOUTH ONCE DAILY IN THE EVENING 90 tablet 3    carvedilol (COREG) 25 MG tablet Take 1.5 tablets by mouth 2 times daily (with meals) 270 tablet 2    Accu-Chek Softclix Lancets MISC bid 100 each 3    Blood Glucose Monitoring Suppl (ACCU-CHEK PHYLLIS CONNECT) w/Device KIT 1 kit by Does not apply route daily 1 kit 0    Continuous Blood Gluc Sensor (DEXCOM G6 SENSOR) MISC 1 Device by Does not apply route continuous (Patient not taking: Reported on 3/9/2022) 3 each 11    Continuous Blood Gluc Transmit (DEXCOM G6 TRANSMITTER) MISC 1 Device by Does not apply route continuous (Patient not taking: Reported on 3/9/2022) 1 each 3    Continuous Blood Gluc  (DEXCOM G6 ) [place] 1 Device by Does not apply route 4 times daily (before meals and nightly) (Patient not taking: Reported on 3/9/2022) 1 each 0    divalproex (DEPAKOTE) 500 MG DR tablet Take 1 tablet by mouth 2 times daily (Patient not taking: Reported on 3/9/2022) 60 tablet 2     No current facility-administered medications for this visit. Additional Medications (including OTC/Herbal Supplements):  · none    Allergies:    Allergies   Allergen Reactions    Ibuprofen Nausea Only    Metformin And Related      Diarrhea      Darvon [Propoxyphene Hcl] Nausea And Vomiting       Pertinent Labs/Vitals:  BP Readings from Last 3 Encounters:   03/07/22 126/60   02/21/22 118/61   02/21/22 101/78     Lab Results   Component Value Date    LABMICR 2.70 (H) 01/02/2018     Lab Results   Component Value Date    LABA1C 11.4 12/09/2021    LABA1C 7.5 (H) 04/29/2021    LABA1C 9.6 (H) 03/19/2021     Lab Results   Component Value Date    CHOL 143 11/30/2020    TRIG 64 11/30/2020    HDL 61 (H) 11/30/2020    LDLCALC 69 11/30/2020     ALT   Date Value Ref Range Status   10/13/2021 15 0 - 33 U/L Final     AST   Date Value Ref Range Status   10/13/2021 16 0 - 35 U/L Final     The 10-year ASCVD risk score (Margaret Reis, et al., 2013) is: 28.2%    Values used to calculate the score:      Age: 67 years      Sex: Female      Is Non- : Yes      Diabetic: Yes      Tobacco smoker: No      Systolic Blood Pressure: 709 mmHg      Is BP treated: Yes      HDL Cholesterol: 61 mg/dL      Total Cholesterol: 143 mg/dL     Lab Results   Component Value Date    CREATININE 2.18 (H) 2022     EGFR: 26.4 mL/min/1.73 m^2    Results for Colletta Fair (MRN [id number]) as of 3/9/2022 11:32   Ref. Range 6/10/2021 11:25 10/13/2021 12:46 2022 11:34   GFR  Latest Ref Range: >60  26.6 (L) 27.2 (L) 26.4 (L)       Social History:   Social History     Tobacco Use    Smoking status: Former Smoker     Packs/day: 1.00     Years: 59.00     Pack years: 59.00     Types: Cigarettes     Start date: 2017     Quit date: 10/1/2020     Years since quittin.4    Smokeless tobacco: Never Used   Substance Use Topics    Alcohol use: Not Currently       Immunizations:   Most Recent Immunizations   Administered Date(s) Administered    COVID-19, Aurther Dyers, Primary or Immunocompromised, PF, 100mcg/0.5mL 2021    Influenza Vaccine, unspecified formulation 2015    Influenza, High Dose (Fluzone 65 yrs and older) 10/23/2017    Influenza, High-dose, Quadv, 65 yrs +, IM (Fluzone) 10/01/2021    Influenza, Quadv, IM, (6 mo and older Fluzone, Flulaval, Fluarix and 3 yrs and older Afluria) 2015    Influenza, Quadv, IM, PF (6 mo and older Fluzone, Flulaval, Fluarix, and 3 yrs and older Afluria) 10/16/2019    Pneumococcal Conjugate 13-valent ([id number]) 10/23/2017    Pneumococcal Polysaccharide ([id number]) 2020    Td (Adult), 5 Lf Tetanus Toxoid, Pf (Tenivac, Decavac) 2011    Td, unspecified formulation 2011       ASSESSMENT/PLAN:   - General Assessment:   · Adherence:  and daughter are managing her medication. Report adherence to everything except Depakote. They report she stopped this week and has done great off of it  · Drug interactions: No clinically significant interactions identified via Lexicomp Interaction Analysis as category D or higher. · Renal dosing: According to Lexicomp, the following should have renal adjustment:  · Nitrofurantoin- use is contraindicated in eGFR <30ml/min.   Patient has been right around 30 for the las 6+months. Medication started and being managed by urologist.  May not be on for much longer    - Primary concern are the hallucinations (auditory and visual) that patient is currently having. · Reports they are occurring mostly in the evening around bedtime  · Initial thoughts:  · UTI (already investigated),   · Paroxtine (Can happen when stopped suddenly, but pt has been on for a very long time)  · Depakote- Very rare, and patient has stopped this med  · Percocet- Patient started 3/7 and only took 1/2 tab 2 times  · Other thoughts:  · Hypo/Hyperglycemia- delirium can be very common with hypoglycmia, but there are several case reports of delirium occurring because of hyperglycemia. · DM is under poor control.  reports rising blood sugar readings throughout the day despite consistent insulin use. Suspect very high blood sugar readings (possibly 400+ in the evening). Encouraged  to have her wear the Dexcom G6 so they can see what her BG does throughout the day. I also believe diet is playing a big role.  says wife snacks during the day and evening and will often drink juice. Explained the possibility of this causing the delirium and the importance of gaining BG control. Verbalized understanding and will attempt to get her to wear Dexcom. · Digoxin toxicity- Not displaying any other signs or symptoms. Has this level check once per year. Has never been elevated    - Diabetes:    Glycemic goal: <7.0% and directed by provider. Is not at blood glucose goal   Current symptoms/problems: polyphagia, polyuria and polydipsia   Home blood sugar records: fasting range: 200-300   Any episodes of hypoglycemia: non have been noted   Current testing supplies/frequency:   o Patient has Dexcom G6 supplies, but is not using  o Patient performs 4 fingersticks per day.   She checks in the morning before eating, then 3 times with each insulin injection.  - Significant amount of education provided on Insulin, BG testing, and goal ranges.  Daughter says that her mom does not fully understand the importance of keeping her BG under control. She feels her mom thinks readings of 300 are normal.     Encouraged  to try to make her wear the Dexcom G6. I feel this will give them a good idea of how high her blood sugar is actually running. Plan:   - Will message urology about nitrofurantoin and kidney function  - Will message Neurology about Depakote being stopped  - I will check back in 1-2 weeks to see if patient has started using Dexcom      - Upcoming appointments:   Future Appointments   Date Time Provider Zaid Lr   3/23/2022  1:00 PM Ismael Acevedo, [address]   5/6/2022 11:30 AM Endy Andersen MD [facility]   6/9/2022  1:30 PM Tre Li MD [facility]   6/27/2022  3:00 PM Tierra Vicente MD [facility].  Josselin Lanza, PharmD, [facility], toll free: [phone]    For Pharmacy Admin Tracking Only   Gap Closed?: Yes    Time Spent (min): 60

## 2022-03-10 ENCOUNTER — TELEPHONE (OUTPATIENT)
Dept: PHARMACY | Facility: CLINIC | Age: 77
End: 2022-03-10

## 2022-03-10 NOTE — TELEPHONE ENCOUNTER
Devendra Unger MD,    Medication review recently completed with patient at the request of her PCP. It was noted she is on Macrodantin for UTI prophylaxis. Use of Nitrofurantoin is contraindicated when GFR <30ml/min. She has been slightly under this ryan for the last 9 months. The main risk is the chronic lung toxicity that can develop after months/year of therapy. Risk is typically higher in women with low GFR. If she is to continue prophylactic UTI therapy after her upcoming visit in May, please consider something such as low dose Bactrim to replace Nitrofurantoin. Results for Zeb Tamez (MRN [id number]) as of 3/10/2022 09:08   Ref. Range 4/30/2021 11:23 6/10/2021 11:25 10/13/2021 12:46 1/14/2022 11:34   GFR  Latest Ref Range: >60  33.5 (L) 26.6 (L) 27.2 (L) 26.4 (L)         Thank you,  JB Flower, PharmD, [address] [facility]  Department, toll free: [phone]

## 2022-03-11 NOTE — TELEPHONE ENCOUNTER
Note opened and done'd by provider. Will sign off. Plan to followup with patient in a week or 2.     Flaquito Sánchez, PharmD, [address] [facility], toll free: [phone]    For Pharmacy Admin Tracking Only     CPA in place:  No   Recommendation Provided To: Provider: 1 via Note to Provider   Intervention Detail: Discontinued Rx: 1, reason: Unsafe Therapy   Gap Closed?: No    Intervention Accepted By: Provider: 0   Time Spent (min): 10

## 2022-03-11 NOTE — TELEPHONE ENCOUNTER
[id number] Denise Davis, when I spoke with family last, that was the plan unless they heard otherwise. I will check back in next week. Thank you for the response.     Rick Pete, PharmD, [address] [facility], toll free: [phone]

## 2022-03-21 ENCOUNTER — TELEPHONE (OUTPATIENT)
Dept: PHARMACY | Facility: CLINIC | Age: 77
End: 2022-03-21

## 2022-03-21 NOTE — TELEPHONE ENCOUNTER
CLINICAL PHARMACY NOTE - Medication Review  Patient outreach to review medications - Spoke with .       SUBJECTIVE/OBJECTIVE:   Martita Maldonado is a 67 y.o. female referred to a clinical pharmacy specialist by provider and/or given their history of CKD and hallucinations.     Spoke with patient's  briefly today. He said that they have not yet used the Dexcom sensors and were waiting until her [place] [id number] nurse cam this week. He did say that they have been working hard to get her BG under control. They have been testing via fingerstick recently and reports readings are now in the 200's. This is an improvement from the 400+ readings they had been seeing. Despite the improvement, she is still having the hallucinations. They have followups planned with all providers. Will check back in the next week or 2.     Irma Martel, PharmD, [address] [place] [address]  Department, toll free: [phone]      For Pharmacy Admin Tracking Only   Time Spent (min): 20

## 2022-03-23 ENCOUNTER — OFFICE VISIT (OUTPATIENT)
Dept: PALLATIVE CARE | Age: 77
End: 2022-03-23
Payer: MEDICARE

## 2022-03-23 VITALS
SYSTOLIC BLOOD PRESSURE: 100 MMHG | DIASTOLIC BLOOD PRESSURE: 50 MMHG | TEMPERATURE: 96.6 F | BODY MASS INDEX: 29.69 KG/M2 | HEART RATE: 73 BPM | OXYGEN SATURATION: 100 % | WEIGHT: 147 LBS

## 2022-03-23 DIAGNOSIS — R73.9 HYPERGLYCEMIA: Primary | ICD-10-CM

## 2022-03-23 DIAGNOSIS — N39.0 CHRONIC UTI: ICD-10-CM

## 2022-03-23 DIAGNOSIS — R45.86 EMOTIONAL LABILITY: ICD-10-CM

## 2022-03-23 DIAGNOSIS — G47.9 SLEEP DISTURBANCE: ICD-10-CM

## 2022-03-23 DIAGNOSIS — R53.82 CHRONIC FATIGUE: ICD-10-CM

## 2022-03-23 DIAGNOSIS — I50.42 CHRONIC COMBINED SYSTOLIC AND DIASTOLIC CHF (CONGESTIVE HEART FAILURE) (HCC): ICD-10-CM

## 2022-03-23 DIAGNOSIS — R31.29 OTHER MICROSCOPIC HEMATURIA: ICD-10-CM

## 2022-03-23 DIAGNOSIS — Z51.5 PALLIATIVE CARE ENCOUNTER: ICD-10-CM

## 2022-03-23 DIAGNOSIS — R44.0 AUDITORY HALLUCINATIONS: ICD-10-CM

## 2022-03-23 DIAGNOSIS — J44.9 CHRONIC OBSTRUCTIVE PULMONARY DISEASE, UNSPECIFIED COPD TYPE (HCC): ICD-10-CM

## 2022-03-23 PROCEDURE — 99349 HOME/RES VST EST MOD MDM 40: CPT | Performed by: NURSE PRACTITIONER

## 2022-03-23 PROCEDURE — G8417 CALC BMI ABV UP PARAM F/U: HCPCS | Performed by: NURSE PRACTITIONER

## 2022-03-23 PROCEDURE — 4040F PNEUMOC VAC/ADMIN/RCVD: CPT | Performed by: NURSE PRACTITIONER

## 2022-03-23 PROCEDURE — 1123F ACP DISCUSS/DSCN MKR DOCD: CPT | Performed by: NURSE PRACTITIONER

## 2022-03-23 PROCEDURE — 1036F TOBACCO NON-USER: CPT | Performed by: NURSE PRACTITIONER

## 2022-03-23 PROCEDURE — 1090F PRES/ABSN URINE INCON ASSESS: CPT | Performed by: NURSE PRACTITIONER

## 2022-03-23 PROCEDURE — G8484 FLU IMMUNIZE NO ADMIN: HCPCS | Performed by: NURSE PRACTITIONER

## 2022-03-23 ASSESSMENT — ENCOUNTER SYMPTOMS
CONSTIPATION: 0
WHEEZING: 0
DIARRHEA: 0
SHORTNESS OF BREATH: 1
VOICE CHANGE: 0
TROUBLE SWALLOWING: 0
COUGH: 0
BACK PAIN: 0
BLOOD IN STOOL: 0
ABDOMINAL DISTENTION: 0
NAUSEA: 0

## 2022-03-23 NOTE — PROGRESS NOTES
Subjective:      Patient Id: Lakesha Erwin was seen at  home in [facility] for palliative medicine follow-up. She was accompanied to the appointment by: spouse, Tommy Bridges, and daughter, Autumn Jones. Chief Complaint   Patient presents with    Other     insomnia, sleep disturbance    Fatigue    Hyperglycemia      HPI       Zeny Acuna is a 67 y.o. female with a complex medical history that includes GI bleeding throughout the distal small intestine, anxiety, asthma, CHF, COPD, CKD depression, fibromyalgia, OA, cardiac arrest, seizures, DM II, and sleep apnea. Home visit is necessary in lieu of office due to significant frailty and high symptom burden from comorbid illnesses. Patient is alert, oriented, in NAD. She was up walking around when I got there with a steady gait and no SOB. Patient reports her breathing has been good. SOB with exertion seems to be improved from baseline. Occasional cough and wheezing. Has been using breathing treatments one to two times per day. Previously discussed pulmonary referral, but patient does not want another specialist to see at this time. No leg swelling/fluid retention. No chest pain. Blood sugars running between 200-400s. She recently got a Dexcom continuous blood glucose reader, however, she has to follow-up with nurse for instructions on how to use this. She only likes to eat one meal per day with snacks and nutrition shakes. Patient is currently not falling asleep until 5 am. She wakes up around 11:30 a.m. to take her pills then goes back to sleep until 5 p.m. Continues to loose weight with diet changes. Was in the upper 150's now at 147 lbs. Patient had MRI since last visit which came back negative for any acute process. Patient continues to have issues with auditory hallucinations.  Her medications were also evaluated by pharmacy to see if any of these could be contributing: Per their note:     · I am wondering if severe BG elevations along with polydipsia and polyuria are leading to the hallucinations. Patient's fasting BG readings are in the 200's and I suspect she spikes as high as 400-500 in the evening. Likely due to diet noncompliance as she is getting her doses of insulin. I encouraged  to keep a better eye on her snacking/juice intake. He is also going to try to get her to wear Dexcom so they have a better idea of where she is running throughout the day and night. · Nitrofurantoin is technically contraindicated when GFR <30m/min. This was started as a preventative med by Urology. All other medications look to be at reasonable doses given kidney function  · Patient stopped Depakote this past week on her own. Neuro instructed to continue. I will make them aware    Of note, per last visit, patient and spouse told me hallucinations started prior to starting macrobid. Patient reports mood has been better. She is trying to increase her activity and participate in ADLs. Patient was started on daily prophylactic macrobid by urology. No s/s of UTI. Past Medical History:   Diagnosis Date    Adenomatous polyp of sigmoid colon     Adenomatous polyp of transverse colon     Anxiety     Asthma     dx 2019 / has smoked since age 11    Cardiopulmonary arrest ([address]     CHF (congestive heart failure) (HCC)     Chronic back pain     Bilateral L5 S1 Radic on emg--surprisingly worse on the left than the right--pt's symptoms and her MRI show worse on the right    Chronic obstructive pulmonary disease with acute exacerbation ([address] 10/12/2019    Depression     ESBL E. coli carrier     Carrier.     Fibromyalgia     Gastrointestinal hemorrhage 2/24/2020    Hyperlipidemia     meds > 8 yrs    Hypertension     meds > 45 yrs    Insomnia 12/4/2013    On home O2     2l per n/c at bedtime mostly,     Osteoarthritis     Seizures ([address]     Sepsis ([address] 10/6/2020    Smoker 6/18/2013    Type II diabetes mellitus, uncontrolled ([address]     hx > 8 yrs    Unspecified sleep apnea      Past Surgical History:   Procedure Laterality Date    BACK SURGERY  2017    lumbar disc    CARDIAC CATHETERIZATION  11/03/2014    DR. MIRELES / no stents    COLONOSCOPY  08/29/2016    w/polypectomy     COLONOSCOPY N/A 09/29/2020    COLONOSCOPY WITH POLYPECTOMY performed by Ramya Redd MD at [facility] N/A 10/07/2019    EUA HYSTEROSCOPY DILATATION AND CURETTAGE performed by Gin Macias DO at [address] [place] [address] ENDOSCOPY, COLON, DIAGNOSTIC      EYE SURGERY      [place] with IOL OU / [address]    umbilical hernia repair    IR PORT PLACEMENT EQUAL OR GREATER THAN 5 YEARS  5/14/2021    IR PORT PLACEMENT EQUAL OR GREATER THAN 5 YEARS 5/14/2021 [facility] SPECIAL PROCEDURE    AL ESOPHAGOGASTRODUODENOSCOPY TRANSORAL DIAGNOSTIC N/A 03/24/2017    EGD ESOPHAGOGASTRODUODENOSCOPY performed by Casey Lozano MD at [place] [id number] 02/08/2018    negative findings    AL REVISE MEDIAN N/CARPAL TUNNEL SURG Left 06/05/2017    LEFT  CARPAL TUNNEL RELEASE performed by Mary Lou Chin MD at [facility],7+X/R,[id number] N/A 02/08/2018    TONSILLECTOMY      as child    TUNNELED VENOUS PORT PLACEMENT Right 05/14/2021    8 [address] [employer] Port ClearVUE by Dr. Hernandez Persons  08/26/2016    w/bx     UPPER GASTROINTESTINAL ENDOSCOPY N/A 02/25/2020    EGD possible biopsy performed by Ramya Redd MD at [place] [address] N/A 07/01/2020    EGD PUSH ENTEROSCOPY performed by Abundio Boateng MD at [facility] Marital status:      Spouse name: Maren Abraham Number of children: 2    Years of education: 12    Highest education level: High school graduate   Occupational History    Occupation: Retired-   Tobacco Use    Smoking status: Former Smoker Packs/day: 1.00     Years: 59.00     Pack years: 59.00     Types: Cigarettes     Start date: 2017     Quit date: 10/1/2020     Years since quittin.4    Smokeless tobacco: Never Used   Vaping Use    Vaping Use: Never used   Substance and Sexual Activity    Alcohol use: Not Currently    Drug use: No    Sexual activity: Yes     Partners: Male   Other Topics Concern    Not on file   Social History Narrative    Grew up in [place]     Lives With:  Miguelina Spouse    Type of Home: House    Home Layout: Two level, Performs ADL's on one level    Home Access: Stairs to enter with rails    Entrance Stairs - Number of Steps: 4    Bathroom Shower/Tub: Tub/Shower unit, Doors    Bathroom Equipment: Shower chair, Grab bars in [address] [place]: Rolling walker, Cane, Oxygen(uses her O2 very seldom)    ADL Assistance: Needs assistance    Homemaking Assistance: Needs assistance    Homemaking Responsibilities: No(spouse performs)    Ambulation Assistance: Independent    Transfer Assistance: Independent    Active : No    Occupation: Retired    [place] of [place] patient enjoys [address] [facility] Strain: Low Risk     Difficulty of Paying Living Expenses: Not hard at [facility] Insecurity: No Food Insecurity    Worried About [facility] in the Last Year: Never true    [facility] in the Last Year: Never true   Transportation Needs:     Lack of Transportation (Medical): Not on file    Lack of Transportation (Non-Medical):  Not on file   Physical Activity:     Days of Exercise per Week: Not on file    Minutes of Exercise per Session: Not on file   Stress:     Feeling of Stress : Not on file   Social Connections:     Frequency of Communication with Friends and Family: Not on file    Frequency of Social Gatherings with Friends and Family: Not on file    Attends Yazdanism Services: Not on file   [place] Active Member of Clubs or Organizations: Not on file    Attends Club or Organization Meetings: Not on file    Marital Status: Not on file   Intimate Partner Violence:     Fear of Current or Ex-Partner: Not on file    Emotionally Abused: Not on file    Physically Abused: Not on file    Sexually Abused: Not on file   Housing Stability:     Unable to Pay for Housing in the Last Year: Not on file    Number of [place] in the Last Year: Not on file    Unstable Housing in the Last Year: Not on file     Family History   Problem Relation Age of Onset    Heart Disease Father         cardiac bypass    Arthritis Father     Arthritis Mother     Other Mother          at age 80    Other Sister         [facility]    No Known Problems Daughter     Stroke Son      Allergies   Allergen Reactions    Ibuprofen Nausea Only    Metformin And Related      Diarrhea      Darvon [Propoxyphene Hcl] Nausea And Vomiting     Current Outpatient Medications on File Prior to Visit   Medication Sig Dispense Refill    insulin lispro (HUMALOG) 100 UNIT/ML injection vial INJECT 15 UNITS SUBCUTANEOUSLY WITH EACH MEAL 10 mL 3    ACCU-CHEK PHYLLIS PLUS strip Test 3x daily Dx E11.65 100 each 3    PARoxetine (PAXIL) 40 MG tablet TAKE 1 TABLET BY MOUTH ONCE DAILY IN THE MORNING 30 tablet 0    nitrofurantoin (MACRODANTIN) 50 MG capsule Take 1 capsule by mouth nightly 90 capsule 3    amLODIPine (NORVASC) 5 MG tablet Take 1 tablet by mouth daily 90 tablet 3    glucose 4 g chewable tablet Take 4 tablets by mouth as needed for Low blood sugar 60 tablet 3    Glucagon (GVOKE HYPOPEN 2-PACK) 1 MG/0.2ML SOAJ Inject 1 mg into the skin every 15 minutes as needed (glucose less tahtn 70 or if symptomatic) 2 each 3    Continuous Blood Gluc Sensor (DEXCOM G6 SENSOR) MISC 1 Device by Does not apply route continuous (Patient not taking: Reported on 3/9/2022) 3 each 11    Continuous Blood Gluc Transmit (DEXCOM G6 TRANSMITTER) MISC 1 Device by Does not apply route continuous (Patient not taking: Reported on 3/9/2022) 1 each 3    Continuous Blood Gluc  (DEXCOM G6 ) [place] 1 Device by Does not apply route 4 times daily (before meals and nightly) (Patient not taking: Reported on 3/9/2022) 1 each 0    insulin glargine (LANTUS) 100 UNIT/ML injection vial 70 units at bedtime 3 each 3    digoxin (LANOXIN) 125 MCG tablet Take 1 tablet by mouth every other day 45 tablet 3    albuterol-ipratropium (COMBIVENT RESPIMAT)  MCG/ACT AERS inhaler Inhale 1 puff into the lungs every 6 hours as needed for Wheezing or Shortness of Breath 4 g 5    divalproex (DEPAKOTE) 500 MG DR tablet Take 1 tablet by mouth 2 times daily (Patient not taking: Reported on 3/9/2022) 60 tablet 2    ipratropium-albuterol (DUONEB) 0.5-2.5 (3) MG/3ML SOLN nebulizer solution Inhale 3 mLs into the lungs 3 times daily 360 mL 0    Cranberry 500 MG CAPS Take 500 mg by mouth daily      pantoprazole (PROTONIX) 40 MG tablet Take 1 tablet by mouth every morning (before breakfast) 90 tablet 3    Cholecalciferol (VITAMIN D3) 50 MCG (2000 UT) CAPS Take 2,000 Units by mouth 2 times daily       lidocaine-prilocaine (EMLA) 2.5-2.5 % cream APPLY CREAM TOPICALLY TO PORT SITE ONE HOUR PRIOR TO APPOINTMENT AS NEEDED      Handicap Placard MISC by Does not apply route Good from 7/15/21 - 7/15/26 1 each 0    rosuvastatin (CRESTOR) 40 MG tablet TAKE 1 TABLET BY MOUTH ONCE DAILY IN THE EVENING 90 tablet 3    carvedilol (COREG) 25 MG tablet Take 1.5 tablets by mouth 2 times daily (with meals) 270 tablet 2    Accu-Chek Softclix Lancets MISC bid 100 each 3    Blood Glucose Monitoring Suppl (ACCU-CHEK PHYLLIS CONNECT) w/Device KIT 1 kit by Does not apply route daily 1 kit 0     No current facility-administered medications on file prior to visit. Review of Systems   Constitutional: Positive for fatigue. Negative for activity change, appetite change, fever and unexpected weight change. HENT: Negative for trouble swallowing and voice change. Respiratory: Positive for shortness of breath (with exertion, improved from baseline). Negative for cough and wheezing. Cardiovascular: Negative for chest pain and leg swelling. Gastrointestinal: Negative for abdominal distention, blood in stool, constipation, diarrhea and nausea. Endocrine: Positive for polydipsia. Genitourinary: Negative. Musculoskeletal: Positive for gait problem. Negative for back pain. Skin: Negative. Neurological: Positive for weakness. Negative for dizziness, seizures and speech difficulty. Psychiatric/Behavioral: Negative for confusion, dysphoric mood, sleep disturbance and suicidal ideas. The patient is not nervous/anxious. Objective:   BP (!) 100/50   Pulse 73   Temp 96.6 °F (35.9 °C)   Wt 147 lb (66.7 kg)   LMP  (LMP Unknown)   SpO2 100%   BMI 29.69 kg/m²    Wt Readings from Last 3 Encounters:   03/23/22 147 lb (66.7 kg)   03/07/22 155 lb (70.3 kg)   02/11/22 155 lb 12.8 oz (70.7 kg)     Physical Exam  Constitutional:       General: She is not in acute distress. HENT:      Head: Normocephalic and atraumatic. Nose: No rhinorrhea. Eyes:      General: No scleral icterus. Right eye: No discharge. Left eye: No discharge. Extraocular Movements: Extraocular movements intact. Conjunctiva/sclera: Conjunctivae normal.   Cardiovascular:      Rate and Rhythm: Normal rate and regular rhythm. Heart sounds: Murmur heard. Pulmonary:      Effort: Pulmonary effort is normal.      Breath sounds: Normal breath sounds. No wheezing or rales. Abdominal:      General: Bowel sounds are normal. There is no distension. Palpations: Abdomen is soft. Tenderness: There is no abdominal tenderness. Musculoskeletal:      Cervical back: Normal range of motion and neck supple. Right lower leg: No edema. Left lower leg: No edema.    Skin:     General: Skin is warm and dry. Neurological:      Mental Status: She is alert and oriented to person, place, and time. Mental status is at baseline. Psychiatric:         Mood and Affect: Mood normal.         Behavior: Behavior normal.         Assessment and Plan:      1. Chronic obstructive pulmonary disease, unspecified COPD type ([facility]  Stable. Continue COPD directed therapies. Call for increased SOB, cough, sputum production, excessive fatigue, fever, chills, or myalgias. Activity as tolerated. 2. Chronic combined systolic and diastolic CHF  Stable. Monitor weight daily, call if you gain 3 lbs in one day or 5 lbs. in one week or for increased edema, sob, cough, orthopnea, or chest pain. Patient follows with cardiology. 3. Auditory hallucinations  Per pharmacist, possible cause is patient's extremely elevated blood sugars. Continue to follow with neuro and endocrine. 4. Hyperglycemia  5. Sleep disturbance  6. Chronic fatigue  Discussed hyperglycemia and effects on microvasculature. We discussed the need for an improved sleep schedule and to get her body to adjust to a better cycle. Discussed waking up one hour earlier each day and not taking any naps during the day. Once patient is sleeping through the night and waking up in the morning, she can take a nap during the day but not longer than 30 minutes. Discussed how her sleep schedule and current routine effects her blood sugars. Follow-up with endocrine. Okay to try melatonin 3 mg po q HS to help with insomnia. 7. Other microscopic hematuria  8. Chronic UTI  Continue to follow with urology. No current s/s of active UTI. On macrobid daily for prophylaxis as started by urology. 9. Emotional lability  Mood improved. Currently on Paxil 40 mg po daily. 10. Palliative care encounter  Discussed symptom management related to chronic disease/condition. Provided emotional support and active listening. Patient understands and is agreeable to current plan.         Due to acuity, symptomatology and high-risk medication management, I advised patient to Return in about 8 weeks (around 5/18/2022). Total Time 50 minutes     Total time includes reviewing other provider notes, reviewing tests, reviewing history, medications, and allergies, counseling patient/family, performing medically appropriate evaluation and examination, and documenting in the medical record.        JOHNATHON Recinos - CNP    Collaborating physician: Dr. Enrique Hurtado

## 2022-03-23 NOTE — PROGRESS NOTES
Thanks for the notifications. I know that family said she was doing better off of depakote but I will see if they would like to talk to urology to see if a different prophylaxis agent can be used for her drug resistant ecoli. Thanks again!

## 2022-03-29 RX ORDER — PAROXETINE HYDROCHLORIDE 40 MG/1
TABLET, FILM COATED ORAL
Qty: 90 TABLET | Refills: 1 | Status: SHIPPED | OUTPATIENT
Start: 2022-03-29 | End: 2022-10-12

## 2022-03-29 RX ORDER — CARVEDILOL 25 MG/1
TABLET ORAL
Qty: 270 TABLET | Refills: 0 | Status: SHIPPED | OUTPATIENT
Start: 2022-03-29 | End: 2022-07-05

## 2022-03-29 NOTE — TELEPHONE ENCOUNTER
Please approve or deny this refill request. The order is pended. Thank you.     LOV 11/5/2021    Next Visit Date:  Future Appointments   Date Time Provider Zaid Adeline   5/6/2022 11:30 AM MD Zach Santos   5/17/2022  1:00 PM JOHNATHON Lee - CNP [facility] Loc   6/9/2022  1:30 PM Itz Hernandez MD [facility] Loc   6/27/2022  3:00 PM MD Zach Cervantes

## 2022-03-31 RX ORDER — BLOOD SUGAR DIAGNOSTIC
STRIP MISCELLANEOUS
Qty: 100 EACH | Refills: 3 | Status: CANCELLED | OUTPATIENT
Start: 2022-03-31

## 2022-03-31 NOTE — TELEPHONE ENCOUNTER
patient requesting medication refill.  Please approve or deny this request.    Rx requested:  Requested Prescriptions     Pending Prescriptions Disp Refills    ACCU-CHEK PHYLLIS PLUS strip 100 each 3     Sig: Test 3x daily Dx E11.65         Last Office Visit:   1/20/2022      Next Visit Date:  Future Appointments   Date Time Provider Zaid Lr   5/6/2022 11:30 AM MD Ike Merrill   5/17/2022  1:00 PM Bela Tabares APRN - CNP [facility]   6/9/2022  1:30 PM Daiana Sanchez MD [facility]   6/27/2022  3:00 PM MD Ike Landers

## 2022-04-06 ENCOUNTER — APPOINTMENT (OUTPATIENT)
Dept: CT IMAGING | Age: 77
DRG: 092 | End: 2022-04-06
Payer: MEDICARE

## 2022-04-06 ENCOUNTER — HOSPITAL ENCOUNTER (INPATIENT)
Age: 77
LOS: 1 days | Discharge: HOME | DRG: 092 | End: 2022-04-07
Attending: EMERGENCY MEDICINE | Admitting: INTERNAL MEDICINE
Payer: MEDICARE

## 2022-04-06 ENCOUNTER — TELEPHONE (OUTPATIENT)
Dept: NEUROLOGY | Age: 77
End: 2022-04-06

## 2022-04-06 DIAGNOSIS — H51.21 INTERNUCLEAR OPHTHALMOPLEGIA OF RIGHT EYE: Primary | ICD-10-CM

## 2022-04-06 PROBLEM — H51.20 INTERNUCLEAR OPHTHALMOPLEGIA: Status: ACTIVE | Noted: 2022-04-06

## 2022-04-06 LAB
ALBUMIN SERPL-MCNC: 3.9 G/DL (ref 3.5–4.6)
ALP BLD-CCNC: 106 U/L (ref 40–130)
ALT SERPL-CCNC: 18 U/L (ref 0–33)
ANION GAP SERPL CALCULATED.3IONS-SCNC: 14 MEQ/L (ref 9–15)
AST SERPL-CCNC: 24 U/L (ref 0–35)
BASOPHILS ABSOLUTE: 0 K/UL (ref 0–0.2)
BASOPHILS RELATIVE PERCENT: 0.6 %
BILIRUB SERPL-MCNC: 0.3 MG/DL (ref 0.2–0.7)
BUN BLDV-MCNC: 22 MG/DL (ref 8–23)
CALCIUM SERPL-MCNC: 10 MG/DL (ref 8.5–9.9)
CHLORIDE BLD-SCNC: 105 MEQ/L (ref 95–107)
CO2: 21 MEQ/L (ref 20–31)
CREAT SERPL-MCNC: 1.94 MG/DL (ref 0.5–0.9)
DIGOXIN LEVEL: 0.8 NG/ML (ref 0.8–2)
EOSINOPHILS ABSOLUTE: 0.3 K/UL (ref 0–0.7)
EOSINOPHILS RELATIVE PERCENT: 4 %
GFR AFRICAN AMERICAN: 30.2
GFR NON-AFRICAN AMERICAN: 25
GLOBULIN: 3 G/DL (ref 2.3–3.5)
GLUCOSE BLD-MCNC: 284 MG/DL (ref 70–99)
GLUCOSE BLD-MCNC: 302 MG/DL (ref 70–99)
HCT VFR BLD CALC: 37.7 % (ref 37–47)
HEMOGLOBIN: 12.2 G/DL (ref 12–16)
INR BLD: 1
LYMPHOCYTES ABSOLUTE: 0.9 K/UL (ref 1–4.8)
LYMPHOCYTES RELATIVE PERCENT: 11.9 %
MAGNESIUM: 2.3 MG/DL (ref 1.7–2.4)
MCH RBC QN AUTO: 27.7 PG (ref 27–31.3)
MCHC RBC AUTO-ENTMCNC: 32.3 % (ref 33–37)
MCV RBC AUTO: 85.8 FL (ref 82–100)
MONOCYTES ABSOLUTE: 0.5 K/UL (ref 0.2–0.8)
MONOCYTES RELATIVE PERCENT: 6.7 %
NEUTROPHILS ABSOLUTE: 5.9 K/UL (ref 1.4–6.5)
NEUTROPHILS RELATIVE PERCENT: 76.8 %
PDW BLD-RTO: 14.7 % (ref 11.5–14.5)
PERFORMED ON: ABNORMAL
PLATELET # BLD: 298 K/UL (ref 130–400)
POTASSIUM SERPL-SCNC: 4.5 MEQ/L (ref 3.4–4.9)
PROTHROMBIN TIME: 13 SEC (ref 12.3–14.9)
RBC # BLD: 4.39 M/UL (ref 4.2–5.4)
SODIUM BLD-SCNC: 140 MEQ/L (ref 135–144)
TOTAL PROTEIN: 6.9 G/DL (ref 6.3–8)
WBC # BLD: 7.7 K/UL (ref 4.8–10.8)

## 2022-04-06 PROCEDURE — 6370000000 HC RX 637 (ALT 250 FOR IP): Performed by: INTERNAL MEDICINE

## 2022-04-06 PROCEDURE — 85610 PROTHROMBIN TIME: CPT

## 2022-04-06 PROCEDURE — 83735 ASSAY OF MAGNESIUM: CPT

## 2022-04-06 PROCEDURE — G0378 HOSPITAL OBSERVATION PER HR: HCPCS

## 2022-04-06 PROCEDURE — 6360000002 HC RX W HCPCS: Performed by: INTERNAL MEDICINE

## 2022-04-06 PROCEDURE — 99285 EMERGENCY DEPT VISIT HI MDM: CPT

## 2022-04-06 PROCEDURE — 70450 CT HEAD/BRAIN W/O DYE: CPT

## 2022-04-06 PROCEDURE — 6370000000 HC RX 637 (ALT 250 FOR IP): Performed by: EMERGENCY MEDICINE

## 2022-04-06 PROCEDURE — 1210000000 HC MED SURG R&B

## 2022-04-06 PROCEDURE — 80053 COMPREHEN METABOLIC PANEL: CPT

## 2022-04-06 PROCEDURE — 94664 DEMO&/EVAL PT USE INHALER: CPT

## 2022-04-06 PROCEDURE — 85025 COMPLETE CBC W/AUTO DIFF WBC: CPT

## 2022-04-06 PROCEDURE — 36415 COLL VENOUS BLD VENIPUNCTURE: CPT

## 2022-04-06 PROCEDURE — 80162 ASSAY OF DIGOXIN TOTAL: CPT

## 2022-04-06 PROCEDURE — 93005 ELECTROCARDIOGRAM TRACING: CPT | Performed by: EMERGENCY MEDICINE

## 2022-04-06 RX ORDER — DEXTROSE MONOHYDRATE 50 MG/ML
100 INJECTION, SOLUTION INTRAVENOUS PRN
Status: DISCONTINUED | OUTPATIENT
Start: 2022-04-06 | End: 2022-04-07 | Stop reason: HOSPADM

## 2022-04-06 RX ORDER — ACETAMINOPHEN 650 MG/1
650 SUPPOSITORY RECTAL EVERY 4 HOURS PRN
Status: DISCONTINUED | OUTPATIENT
Start: 2022-04-06 | End: 2022-04-07 | Stop reason: HOSPADM

## 2022-04-06 RX ORDER — CLOPIDOGREL BISULFATE 75 MG/1
150 TABLET ORAL ONCE
Status: DISCONTINUED | OUTPATIENT
Start: 2022-04-06 | End: 2022-04-06

## 2022-04-06 RX ORDER — AMLODIPINE BESYLATE 5 MG/1
5 TABLET ORAL DAILY
Status: DISCONTINUED | OUTPATIENT
Start: 2022-04-06 | End: 2022-04-07 | Stop reason: HOSPADM

## 2022-04-06 RX ORDER — LORAZEPAM 2 MG/ML
0.5 INJECTION INTRAMUSCULAR ONCE
Status: DISCONTINUED | OUTPATIENT
Start: 2022-04-06 | End: 2022-04-07 | Stop reason: HOSPADM

## 2022-04-06 RX ORDER — ONDANSETRON 2 MG/ML
4 INJECTION INTRAMUSCULAR; INTRAVENOUS EVERY 6 HOURS PRN
Status: DISCONTINUED | OUTPATIENT
Start: 2022-04-06 | End: 2022-04-07 | Stop reason: HOSPADM

## 2022-04-06 RX ORDER — ASPIRIN 81 MG/1
81 TABLET, CHEWABLE ORAL ONCE
Status: DISCONTINUED | OUTPATIENT
Start: 2022-04-06 | End: 2022-04-06

## 2022-04-06 RX ORDER — CLOPIDOGREL BISULFATE 75 MG/1
75 TABLET ORAL DAILY
Status: DISCONTINUED | OUTPATIENT
Start: 2022-04-07 | End: 2022-04-07

## 2022-04-06 RX ORDER — CLOPIDOGREL BISULFATE 75 MG/1
75 TABLET ORAL ONCE
Status: COMPLETED | OUTPATIENT
Start: 2022-04-06 | End: 2022-04-06

## 2022-04-06 RX ORDER — ATORVASTATIN CALCIUM 80 MG/1
80 TABLET, FILM COATED ORAL NIGHTLY
Status: DISCONTINUED | OUTPATIENT
Start: 2022-04-06 | End: 2022-04-07 | Stop reason: HOSPADM

## 2022-04-06 RX ORDER — ACETAMINOPHEN 325 MG/1
650 TABLET ORAL EVERY 4 HOURS PRN
Status: DISCONTINUED | OUTPATIENT
Start: 2022-04-06 | End: 2022-04-07 | Stop reason: HOSPADM

## 2022-04-06 RX ORDER — IPRATROPIUM BROMIDE AND ALBUTEROL SULFATE 2.5; .5 MG/3ML; MG/3ML
1 SOLUTION RESPIRATORY (INHALATION) EVERY 4 HOURS PRN
Status: DISCONTINUED | OUTPATIENT
Start: 2022-04-06 | End: 2022-04-07 | Stop reason: HOSPADM

## 2022-04-06 RX ORDER — LABETALOL HYDROCHLORIDE 5 MG/ML
10 INJECTION, SOLUTION INTRAVENOUS EVERY 10 MIN PRN
Status: DISCONTINUED | OUTPATIENT
Start: 2022-04-06 | End: 2022-04-07 | Stop reason: HOSPADM

## 2022-04-06 RX ORDER — DEXTROSE MONOHYDRATE 25 G/50ML
12.5 INJECTION, SOLUTION INTRAVENOUS PRN
Status: DISCONTINUED | OUTPATIENT
Start: 2022-04-06 | End: 2022-04-06 | Stop reason: CLARIF

## 2022-04-06 RX ORDER — ONDANSETRON 4 MG/1
4 TABLET, ORALLY DISINTEGRATING ORAL EVERY 8 HOURS PRN
Status: DISCONTINUED | OUTPATIENT
Start: 2022-04-06 | End: 2022-04-07 | Stop reason: HOSPADM

## 2022-04-06 RX ORDER — POLYETHYLENE GLYCOL 3350 17 G/17G
17 POWDER, FOR SOLUTION ORAL DAILY PRN
Status: DISCONTINUED | OUTPATIENT
Start: 2022-04-06 | End: 2022-04-07 | Stop reason: HOSPADM

## 2022-04-06 RX ORDER — NICOTINE POLACRILEX 4 MG
15 LOZENGE BUCCAL PRN
Status: DISCONTINUED | OUTPATIENT
Start: 2022-04-06 | End: 2022-04-07 | Stop reason: HOSPADM

## 2022-04-06 RX ORDER — PAROXETINE HYDROCHLORIDE 20 MG/1
40 TABLET, FILM COATED ORAL DAILY
Status: DISCONTINUED | OUTPATIENT
Start: 2022-04-06 | End: 2022-04-07 | Stop reason: HOSPADM

## 2022-04-06 RX ORDER — PANTOPRAZOLE SODIUM 40 MG/1
40 TABLET, DELAYED RELEASE ORAL
Status: DISCONTINUED | OUTPATIENT
Start: 2022-04-07 | End: 2022-04-07 | Stop reason: HOSPADM

## 2022-04-06 RX ORDER — INSULIN GLARGINE 100 [IU]/ML
50 INJECTION, SOLUTION SUBCUTANEOUS NIGHTLY
Status: DISCONTINUED | OUTPATIENT
Start: 2022-04-06 | End: 2022-04-07 | Stop reason: HOSPADM

## 2022-04-06 RX ORDER — DIGOXIN 125 MCG
125 TABLET ORAL EVERY OTHER DAY
Status: DISCONTINUED | OUTPATIENT
Start: 2022-04-06 | End: 2022-04-07 | Stop reason: HOSPADM

## 2022-04-06 RX ADMIN — DIGOXIN 125 MCG: 125 TABLET ORAL at 22:11

## 2022-04-06 RX ADMIN — ENOXAPARIN SODIUM 30 MG: 100 INJECTION SUBCUTANEOUS at 22:12

## 2022-04-06 RX ADMIN — INSULIN LISPRO 2 UNITS: 100 INJECTION, SOLUTION INTRAVENOUS; SUBCUTANEOUS at 22:13

## 2022-04-06 RX ADMIN — INSULIN GLARGINE 50 UNITS: 100 INJECTION, SOLUTION SUBCUTANEOUS at 22:12

## 2022-04-06 RX ADMIN — CARVEDILOL 37.5 MG: 12.5 TABLET, FILM COATED ORAL at 22:11

## 2022-04-06 RX ADMIN — CLOPIDOGREL BISULFATE 75 MG: 75 TABLET ORAL at 17:14

## 2022-04-06 RX ADMIN — ATORVASTATIN CALCIUM 80 MG: 80 TABLET, FILM COATED ORAL at 22:11

## 2022-04-06 ASSESSMENT — ENCOUNTER SYMPTOMS
CHEST TIGHTNESS: 0
NAUSEA: 0
VOMITING: 0
SHORTNESS OF BREATH: 0
SORE THROAT: 0
EYE PAIN: 0
ABDOMINAL PAIN: 0

## 2022-04-06 ASSESSMENT — PAIN - FUNCTIONAL ASSESSMENT: PAIN_FUNCTIONAL_ASSESSMENT: 0-10

## 2022-04-06 ASSESSMENT — PAIN SCALES - GENERAL
PAINLEVEL_OUTOF10: 0
PAINLEVEL_OUTOF10: 3
PAINLEVEL_OUTOF10: 8

## 2022-04-06 ASSESSMENT — PAIN DESCRIPTION - PAIN TYPE: TYPE: ACUTE PAIN

## 2022-04-06 ASSESSMENT — PAIN DESCRIPTION - LOCATION
LOCATION: NECK
LOCATION: KNEE;NECK

## 2022-04-06 NOTE — ED NOTES
Pt reports she began experiencing double vision since \"yesterday morning. \" Pt denies hx of stroke, reports she has had 2 heart attacks in the past. Pt diabetic, a/o x4. No obvious distress.      Bryan Watkins RN  04/06/22 [id number]

## 2022-04-06 NOTE — ED NOTES
Attempted to call report, no answer from Moris Phelps. [place]/charge RN notified.      Sarkis Montano RN  04/06/22 [id number]

## 2022-04-06 NOTE — TELEPHONE ENCOUNTER
Patients  called stating patient woke up with Double vision yesterday and went to the eye doctor today, was told that she had internuclear opthalmoplegia, and to see Alpa Arguelles. Upon speaking with Dr. Fab Dacosta, he states that she should report to ER to rule out stroke. Called  and advised to go to ER, also advised patient who was on speaker phone.

## 2022-04-06 NOTE — ED NOTES
Attempted to call report, no answer from [phone] Kira Chawla RN notified.         Francie Contreras RN  04/06/22 [id number]

## 2022-04-06 NOTE — ED NOTES
Pt in bed, updated on lab results thus far, denies needs currently. Call light within reach.       Josef Anderson RN  04/06/22 [id number]

## 2022-04-06 NOTE — ED NOTES
Report called to [phone] [facility] put in.      Bryan Watkins RN  04/06/22 [id number]

## 2022-04-06 NOTE — H&P
Department of Internal Medicine  General Internal Medicine  Attending History and Physical      CHIEF COMPLAINT:  Diplopia    Reason for Admission:  Rule out CVA    History Obtained From:  patient    HISTORY OF PRESENT ILLNESS:      The patient is a 67 y.o. female with significant past medical history of HTN/HLD, DM2, depression, CHF, seizures who presents from ophthalmology office with concern for new CVA after found to have internuclear ophthalmoplegia which symptoms started yesterday. Ophthalmologist was concerned with this new finding and called Dr. Martina Lu who believed pt likely had a new CVA and recommended admission to the hospital for MRI and to start on plavix with the intention of starting renally dosed Eliquis tomorrow. Pt without any other focal complaints - no facial droop, no weakness, no slurred speech. Labs baseline. CT head negative. Pt had an MRI back in Feb which was negative for acute processes at that time. Pt admitted for workup of new onset diplopia. Past Medical History:        Diagnosis Date    Adenomatous polyp of sigmoid colon     Adenomatous polyp of transverse colon     Anxiety     Asthma     dx 2019 / has smoked since age 11    Cardiopulmonary arrest ([address]     CHF (congestive heart failure) (HCC)     Chronic back pain     Bilateral L5 S1 Radic on emg--surprisingly worse on the left than the right--pt's symptoms and her MRI show worse on the right    Chronic obstructive pulmonary disease with acute exacerbation ([address] 10/12/2019    Depression     ESBL E. coli carrier     Carrier.     Fibromyalgia     Gastrointestinal hemorrhage 2/24/2020    Hyperlipidemia     meds > 8 yrs    Hypertension     meds > 45 yrs    Insomnia 12/4/2013    On home O2     2l per n/c at bedtime mostly,     Osteoarthritis     Seizures ([address]     Sepsis ([address] 10/6/2020    Smoker 6/18/2013    Type II diabetes mellitus, uncontrolled ([address]     hx > 8 yrs    Unspecified sleep apnea      Past Surgical Mother     Other Mother          at age 80    Other Sister         [facility]    No Known Problems Daughter     Stroke Son      REVIEW OF SYSTEMS:  12 point ROS was negative unless otherwise noted in the HPI   PHYSICAL EXAM:    Vitals:  BP (!) 160/71   Pulse 80   Temp 96.9 °F (36.1 °C) (Temporal)   Resp 18   Ht 4' 11\" (1.499 m)   Wt 136 lb (61.7 kg)   LMP  (LMP Unknown)   SpO2 98%   BMI 27.47 kg/m²     Constitutional: Awake and alert in no acute distress. Lying in bed comfortably  Head: Normocephalic, atraumatic  Eyes: Disconjugate gaze, R pupil does not move past midline medially  ENT: moist mucous membranes  Neck: neck supple, trachea midline  Lungs: Good inspiratory effort, no wheeze, no rhonchi, no rales  Heart: RRR, normal S1 and S2  GI: Soft, non-distended, non tender, no guarding, no rebound, +BS  MSK:  no edema noted  Skin: warm, dry  Psych: anxious affect       DATA:  CBC:   Lab Results   Component Value Date    WBC 7.7 2022    RBC 4.39 2022    HGB 12.2 2022    HCT 37.7 2022    MCV 85.8 2022    MCH 27.7 2022    MCHC 32.3 2022    RDW 14.7 2022     2022    MPV 8.8 2015     CMP:    Lab Results   Component Value Date     2022    K 4.5 2022    K 4.4 10/19/2020     2022    CO2 21 2022    BUN 22 2022    CREATININE 1.94 2022    GFRAA 30.2 2022    LABGLOM 25.0 2022    GLUCOSE 302 2022    PROT 6.9 2022    LABALBU 3.9 2022    CALCIUM 10.0 2022    BILITOT 0.3 2022    ALKPHOS 106 2022    AST 24 2022    ALT 18 2022     ASSESSMENT AND PLAN:      #  New onset Internuclear ophthalmoplegia  - diagnosed by ophthalmology in the office today after patient was experiencing double vision since yesterday  - discussed with neuro who wants pt admitted to rule out new CVA  - starting pt on plavix.  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 May start Eliquis tomorrow. Defer to neuro    Disposition: Pt with new onset diplopia concerning for new CVA. MRI ordered. Neuro consulted. Started on plavix. Anticipated length of stay greater than 48 hours.       Lora Amado DO  Internal Medicine   Hospitalist    >45 minutes in total care time

## 2022-04-06 NOTE — ED NOTES
Pt updated on plan of care, awaiting CT results.  ED provider awaiting call from Neurologist.      Jennie Beard RN  04/06/22 3144

## 2022-04-06 NOTE — ED PROVIDER NOTES
[facility] ED  EMERGENCY DEPARTMENT ENCOUNTER      Pt Name: Isma Hagen  MRN: [id number]  [place] 1945  Date of evaluation: 4/6/2022  Provider: Blayne Roy, [address] [place] [address]       Chief Complaint   Patient presents with    Diplopia     since yesterday, R>L, sent by eye doctor         HISTORY OF PRESENT ILLNESS   (Location/Symptom, Timing/Onset, Context/Setting, Quality, Duration, Modifying Factors, Severity)  Note limiting factors. Isma Hagen is a 67 y.o. female who presents to the emergency department . Patient was brought in by family for diplopia. Patient woke up yesterday with double vision. Today daughter took her to the eye doctor who diagnosed her with internuclear ophthalmoplegia. Dr. Deidre Mireles was called and told them to get her to the ER. Patient is denying any trouble speaking no facial droop no weakness or numbness to her arms or legs. Patient not on blood thinner. Had MRI of the brain in February 2022 which only showed chronic ischemic disease. HPI    Nursing Notes were reviewed. REVIEW OF SYSTEMS    (2-9 systems for level 4, 10 or more for level 5)     Review of Systems   Constitutional: Negative for activity change, appetite change, fatigue and fever. HENT: Negative for congestion and sore throat. Eyes: Positive for visual disturbance. Negative for pain. Respiratory: Negative for chest tightness and shortness of breath. Cardiovascular: Negative for chest pain. Gastrointestinal: Negative for abdominal pain, nausea and vomiting. Endocrine: Negative for polydipsia. Genitourinary: Negative for flank pain and urgency. Musculoskeletal: Negative for gait problem and neck stiffness. Skin: Negative for rash. Neurological: Negative for dizziness, tremors, seizures, syncope, facial asymmetry, speech difficulty, weakness, light-headedness, numbness and headaches. Psychiatric/Behavioral: Negative for confusion and sleep disturbance. Except as noted above the remainder of the review of systems was reviewed and negative. PAST MEDICAL HISTORY     Past Medical History:   Diagnosis Date    Adenomatous polyp of sigmoid colon     Adenomatous polyp of transverse colon     Anxiety     Asthma     dx 2019 / has smoked since age 11    Cardiopulmonary arrest ([address]     CHF (congestive heart failure) (HCC)     Chronic back pain     Bilateral L5 S1 Radic on emg--surprisingly worse on the left than the right--pt's symptoms and her MRI show worse on the right    Chronic obstructive pulmonary disease with acute exacerbation ([address] 10/12/2019    Depression     ESBL E. coli carrier     Carrier.  Fibromyalgia     Gastrointestinal hemorrhage 2/24/2020    Hyperlipidemia     meds > 8 yrs    Hypertension     meds > 45 yrs    Insomnia 12/4/2013    On home O2     2l per n/c at bedtime mostly,     Osteoarthritis     Seizures ([address]     Sepsis ([address] 10/6/2020    Smoker 6/18/2013    Type II diabetes mellitus, uncontrolled ([address]     hx > 8 yrs    Unspecified sleep apnea          SURGICAL HISTORY       Past Surgical History:   Procedure Laterality Date    BACK SURGERY  2017    lumbar disc    CARDIAC CATHETERIZATION  11/03/2014    DR. MIRELES / no stents    COLONOSCOPY  08/29/2016    w/polypectomy     COLONOSCOPY N/A 09/29/2020    COLONOSCOPY WITH POLYPECTOMY performed by Kellen Alvarez MD at [facility] N/A 10/07/2019    EUA HYSTEROSCOPY DILATATION AND CURETTAGE performed by Dariel Valenzuela DO at [facility], DIAGNOSTIC      EYE SURGERY      [place] with IOL OU / [id number]    umbilical hernia repair    IR PORT PLACEMENT EQUAL OR GREATER THAN 5 YEARS  5/14/2021    IR PORT PLACEMENT EQUAL OR GREATER THAN 5 YEARS 5/14/2021 [facility] SPECIAL PROCEDURE    CT ESOPHAGOGASTRODUODENOSCOPY TRANSORAL DIAGNOSTIC N/A 03/24/2017    EGD ESOPHAGOGASTRODUODENOSCOPY performed by Tucker Robertson MD at [facility] N/A 02/08/2018    negative findings    DC REVISE MEDIAN N/CARPAL TUNNEL SURG Left 06/05/2017    LEFT  CARPAL TUNNEL RELEASE performed by Delio Turner MD at [facility],8+G/H,[facility] N/A 02/08/2018    TONSILLECTOMY      as child    TUNNELED VENOUS PORT PLACEMENT Right 05/14/2021    [employer] Port ClearVUE by Dr. Urszula San  08/26/2016    w/bx     UPPER GASTROINTESTINAL ENDOSCOPY N/A 02/25/2020    EGD possible biopsy performed by Argelia Cutler MD at [facility] 07/01/2020    EGD PUSH ENTEROSCOPY performed by Immanuel Flor MD at [facility]       Previous Medications    ACCU-CHEK PHYLLIS PLUS STRIP    Test 3x daily Dx E11.65    ACCU-CHEK SOFTCLIX LANCETS MISC    bid    ALBUTEROL-IPRATROPIUM (COMBIVENT RESPIMAT)  MCG/ACT AERS INHALER    Inhale 1 puff into the lungs every 6 hours as needed for Wheezing or Shortness of Breath    AMLODIPINE (NORVASC) 5 MG TABLET    Take 1 tablet by mouth daily    BLOOD GLUCOSE MONITORING SUPPL (ACCU-CHEK PHYLLIS CONNECT) W/DEVICE KIT    1 kit by Does not apply route daily    CARVEDILOL (COREG) 25 MG TABLET    TAKE 1 & 1/2 (ONE & ONE-HALF) TABLETS BY MOUTH TWICE DAILY WITH MEALS    CHOLECALCIFEROL (VITAMIN D3) 50 MCG (2000 UT) CAPS    Take 2,000 Units by mouth 2 times daily     CONTINUOUS BLOOD GLUC  (DEXCOM G6 ) [place]    1 Device by Does not apply route 4 times daily (before meals and nightly)    CONTINUOUS BLOOD GLUC SENSOR (DEXCOM G6 SENSOR) MISC    1 Device by Does not apply route continuous    CONTINUOUS BLOOD GLUC TRANSMIT (DEXCOM G6 TRANSMITTER) MISC    1 Device by Does not apply route continuous    CRANBERRY 500 MG CAPS    Take 500 mg by mouth daily    DIGOXIN (LANOXIN) 125 MCG TABLET    Take 1 tablet by mouth every other day DIVALPROEX (DEPAKOTE) 500 MG DR TABLET    Take 1 tablet by mouth 2 times daily    GLUCAGON (Venora Fluke 2-PACK) 1 MG/0.2ML SOAJ    Inject 1 mg into the skin every 15 minutes as needed (glucose less tahtn 70 or if symptomatic)    GLUCOSE 4 G CHEWABLE TABLET    Take 4 tablets by mouth as needed for Low blood sugar    HANDICAP PLACARD MISC    by Does not apply route Good from 7/15/21 - 7/15/26    INSULIN GLARGINE (LANTUS) 100 UNIT/ML INJECTION VIAL    70 units at bedtime    INSULIN LISPRO (HUMALOG) 100 UNIT/ML INJECTION VIAL    INJECT 15 UNITS SUBCUTANEOUSLY WITH EACH MEAL    IPRATROPIUM-ALBUTEROL (DUONEB) 0.5-2.5 (3) MG/3ML SOLN NEBULIZER SOLUTION    Inhale 3 mLs into the lungs 3 times daily    LIDOCAINE-PRILOCAINE (EMLA) 2.5-2.5 % CREAM    APPLY CREAM TOPICALLY TO PORT SITE ONE HOUR PRIOR TO APPOINTMENT AS NEEDED    NITROFURANTOIN (MACRODANTIN) 50 MG CAPSULE    Take 1 capsule by mouth nightly    PANTOPRAZOLE (PROTONIX) 40 MG TABLET    Take 1 tablet by mouth every morning (before breakfast)    PAROXETINE (PAXIL) 40 MG TABLET    TAKE 1 TABLET BY MOUTH ONCE DAILY IN THE MORNING    ROSUVASTATIN (CRESTOR) 40 MG TABLET    TAKE 1 TABLET BY MOUTH ONCE DAILY IN THE EVENING       ALLERGIES     Ibuprofen, Metformin and related, and Darvon [propoxyphene hcl]    FAMILY HISTORY       Family History   Problem Relation Age of Onset    Heart Disease Father         cardiac bypass    Arthritis Father     Arthritis Mother     Other Mother          at age 80    Other Sister         [facility]    No Known Problems Daughter     Stroke Son           SOCIAL HISTORY       Social History     Socioeconomic History    Marital status:      Spouse name: Audra Zavala Number of children: 2    Years of education: 12    Highest education level: High school graduate   Occupational History    Occupation: Retired-   Tobacco Use    Smoking status: Former Smoker     Packs/day: 1.00     Years: 59.00     Pack Attends Club or Organization Meetings: Not on file    Marital Status: Not on file   Intimate Partner Violence:     Fear of Current or Ex-Partner: Not on file    Emotionally Abused: Not on file    Physically Abused: Not on file    Sexually Abused: Not on file   Housing Stability:     Unable to Pay for Housing in the Last Year: Not on file    Number of [place] in the Last Year: Not on file    Unstable Housing in the Last Year: Not on file       SCREENINGS   NIH Stroke Scale  Interval: Baseline  Level of Consciousness (1a): Alert  LOC Questions (1b): Answers both correctly  LOC Commands (1c): Performs both tasks correctly  Best Gaze (2): (!) Partial gaze palsy  Visual (3): No visual loss  Facial Palsy (4): Normal symmetrical movement  Motor Arm, Left (5a): No drift  Motor Arm, Right (5b): No drift  Motor Leg, Left (6a): No drift  Motor Leg, Right (6b): No drift  Limb Ataxia (7): (!) Present in two limbs  Sensory (8): Normal  Best Language (9): No aphasia  Dysarthria (10): Normal  Extinction and Inattention (11): No abnormality  Total: 3    Abdiaziz Coma Scale  Eye Opening: Spontaneous  Best Verbal Response: Oriented  Best Motor Response: Obeys commands  Mathis Coma Scale Score: 15               PHYSICAL EXAM    (up to 7 for level 4, 8 or more for level 5)     ED Triage Vitals [04/06/22 1409]   BP Temp Temp Source Pulse Resp SpO2 Height Weight   (!) 128/57 96.9 °F (36.1 °C) Temporal 84 18 98 % 4' 11\" (1.499 m) 136 lb (61.7 kg)       Physical Exam  Vitals and nursing note reviewed. Constitutional:       General: She is not in acute distress. Appearance: She is well-developed. She is not diaphoretic. HENT:      Head: Normocephalic and atraumatic. Right Ear: External ear normal.      Left Ear: External ear normal.      Nose: Nose normal.      Mouth/Throat:      Mouth: Mucous membranes are moist.      Pharynx: No oropharyngeal exudate.    Eyes:      Conjunctiva/sclera: Conjunctivae normal. Pupils: Pupils are equal, round, and reactive to light. Comments: Right pupil weak on horizontal movement medially. Disconjugate gaze. Pupils equal.  Some horizontal nystagmus to the left. Neck:      Thyroid: No thyromegaly. Vascular: No JVD. Trachea: No tracheal deviation. Cardiovascular:      Rate and Rhythm: Normal rate. Heart sounds: Normal heart sounds. No murmur heard. Pulmonary:      Effort: Pulmonary effort is normal. No respiratory distress. Breath sounds: Normal breath sounds. No wheezing. Abdominal:      General: Bowel sounds are normal.      Palpations: Abdomen is soft. Tenderness: There is no abdominal tenderness. There is no guarding. Musculoskeletal:         General: Normal range of motion. Cervical back: Normal range of motion and neck supple. Skin:     General: Skin is warm and dry. Findings: No rash. Neurological:      Mental Status: She is alert and oriented to person, place, and time. Cranial Nerves: No cranial nerve deficit. Sensory: Sensory deficit present. Coordination: Coordination abnormal.   Psychiatric:         Behavior: Behavior normal.         DIAGNOSTIC RESULTS     EKG: All EKG's are interpreted by the Emergency Department Physician who either signs or Co-signs this chart in the absence of a cardiologist.    Normal sinus rhythm 87 bpm left atrial enlargement with LVH    RADIOLOGY:   Non-plain film images such as CT, Ultrasound and MRI are read by the radiologist. Plain radiographic images are visualized and preliminarily interpreted by the emergency physician with the below findings:        Interpretation per the Radiologist below, if available at the time of this note:    CT Head WO Contrast   Final Result      No acute intracranial process.          All CT scans at this facility use dose modulation, iterative reconstruction, and/or weight based dosing when appropriate to reduce radiation dose to as low as procedures. There was a high probability of clinically significant/life threatening deterioration in the patient's condition which required my urgent intervention. CONSULTS:  None    PROCEDURES:  Unless otherwise noted below, none     Procedures        FINAL IMPRESSION      1. Internuclear ophthalmoplegia of right eye          DISPOSITION/PLAN   DISPOSITION Decision To Admit 04/06/2022 05:16:20 PM      PATIENT REFERRED TO:  No follow-up provider specified. DISCHARGE MEDICATIONS:  New Prescriptions    No medications on file     Controlled Substances Monitoring:     RX Monitoring 10/20/2020   Attestation -   Acute Pain Prescriptions Prescription exceeds daily limit for a specific reason. See comments or note. ;Not required given exclusionary diagnoses. ..;Severe pain not adequately treated with lower dose. Periodic Controlled Substance Monitoring Possible medication side effects, risk of tolerance/dependence & alternative treatments discussed. ;No signs of potential drug abuse or diversion identified. ;Assessed functional status. ;Obtaining appropriate analgesic effect of treatment.    Chronic Pain > 50 MEDD -   Chronic Pain > 80 MEDD -       (Please note that portions of this note were completed with a voice recognition program.  Efforts were made to edit the dictations but occasionally words are mis-transcribed.)    Nalini March DO (electronically signed)  Attending Emergency Physician           Nalini March DO  04/06/22 1941

## 2022-04-07 ENCOUNTER — APPOINTMENT (OUTPATIENT)
Dept: ULTRASOUND IMAGING | Age: 77
DRG: 092 | End: 2022-04-07
Payer: MEDICARE

## 2022-04-07 ENCOUNTER — APPOINTMENT (OUTPATIENT)
Dept: MRI IMAGING | Age: 77
DRG: 092 | End: 2022-04-07
Payer: MEDICARE

## 2022-04-07 VITALS
HEART RATE: 88 BPM | BODY MASS INDEX: 27.42 KG/M2 | WEIGHT: 136 LBS | SYSTOLIC BLOOD PRESSURE: 155 MMHG | RESPIRATION RATE: 18 BRPM | HEIGHT: 59 IN | TEMPERATURE: 98.1 F | DIASTOLIC BLOOD PRESSURE: 58 MMHG | OXYGEN SATURATION: 97 %

## 2022-04-07 LAB
ANION GAP SERPL CALCULATED.3IONS-SCNC: 13 MEQ/L (ref 9–15)
BUN BLDV-MCNC: 24 MG/DL (ref 8–23)
CALCIUM SERPL-MCNC: 9.7 MG/DL (ref 8.5–9.9)
CHLORIDE BLD-SCNC: 104 MEQ/L (ref 95–107)
CHOLESTEROL, TOTAL: 106 MG/DL (ref 0–199)
CO2: 21 MEQ/L (ref 20–31)
CREAT SERPL-MCNC: 1.84 MG/DL (ref 0.5–0.9)
EKG ATRIAL RATE: 87 BPM
EKG P AXIS: 62 DEGREES
EKG P-R INTERVAL: 144 MS
EKG Q-T INTERVAL: 340 MS
EKG QRS DURATION: 82 MS
EKG QTC CALCULATION (BAZETT): 409 MS
EKG R AXIS: 38 DEGREES
EKG T AXIS: 176 DEGREES
EKG VENTRICULAR RATE: 87 BPM
GFR AFRICAN AMERICAN: 32.1
GFR NON-AFRICAN AMERICAN: 26.6
GLUCOSE BLD-MCNC: 230 MG/DL (ref 70–99)
GLUCOSE BLD-MCNC: 249 MG/DL (ref 70–99)
GLUCOSE BLD-MCNC: 252 MG/DL (ref 70–99)
GLUCOSE BLD-MCNC: 258 MG/DL (ref 70–99)
GLUCOSE BLD-MCNC: 279 MG/DL (ref 70–99)
HBA1C MFR BLD: 12.8 % (ref 4.8–5.9)
HCT VFR BLD CALC: 37.7 % (ref 37–47)
HDLC SERPL-MCNC: 33 MG/DL (ref 40–59)
HEMOGLOBIN: 12 G/DL (ref 12–16)
LDL CHOLESTEROL CALCULATED: 36 MG/DL (ref 0–129)
MCH RBC QN AUTO: 27.3 PG (ref 27–31.3)
MCHC RBC AUTO-ENTMCNC: 31.7 % (ref 33–37)
MCV RBC AUTO: 86.1 FL (ref 82–100)
PDW BLD-RTO: 14.9 % (ref 11.5–14.5)
PERFORMED ON: ABNORMAL
PLATELET # BLD: 298 K/UL (ref 130–400)
POTASSIUM REFLEX MAGNESIUM: 4.1 MEQ/L (ref 3.4–4.9)
RBC # BLD: 4.38 M/UL (ref 4.2–5.4)
SODIUM BLD-SCNC: 138 MEQ/L (ref 135–144)
TRIGL SERPL-MCNC: 183 MG/DL (ref 0–150)
WBC # BLD: 7.1 K/UL (ref 4.8–10.8)

## 2022-04-07 PROCEDURE — 97165 OT EVAL LOW COMPLEX 30 MIN: CPT

## 2022-04-07 PROCEDURE — 83036 HEMOGLOBIN GLYCOSYLATED A1C: CPT

## 2022-04-07 PROCEDURE — 80048 BASIC METABOLIC PNL TOTAL CA: CPT

## 2022-04-07 PROCEDURE — APPSS45 APP SPLIT SHARED TIME 31-45 MINUTES: Performed by: NURSE PRACTITIONER

## 2022-04-07 PROCEDURE — G0378 HOSPITAL OBSERVATION PER HR: HCPCS

## 2022-04-07 PROCEDURE — 6370000000 HC RX 637 (ALT 250 FOR IP): Performed by: INTERNAL MEDICINE

## 2022-04-07 PROCEDURE — 97162 PT EVAL MOD COMPLEX 30 MIN: CPT

## 2022-04-07 PROCEDURE — 99222 1ST HOSP IP/OBS MODERATE 55: CPT | Performed by: PSYCHIATRY & NEUROLOGY

## 2022-04-07 PROCEDURE — 96372 THER/PROPH/DIAG INJ SC/IM: CPT

## 2022-04-07 PROCEDURE — 70551 MRI BRAIN STEM W/O DYE: CPT

## 2022-04-07 PROCEDURE — 93880 EXTRACRANIAL BILAT STUDY: CPT

## 2022-04-07 PROCEDURE — 93010 ELECTROCARDIOGRAM REPORT: CPT | Performed by: INTERNAL MEDICINE

## 2022-04-07 PROCEDURE — 80061 LIPID PANEL: CPT

## 2022-04-07 PROCEDURE — 85027 COMPLETE CBC AUTOMATED: CPT

## 2022-04-07 PROCEDURE — 36415 COLL VENOUS BLD VENIPUNCTURE: CPT

## 2022-04-07 RX ORDER — CLOPIDOGREL BISULFATE 75 MG/1
75 TABLET ORAL DAILY
Qty: 30 TABLET | Refills: 3 | Status: SHIPPED | OUTPATIENT
Start: 2022-04-08 | End: 2022-04-07 | Stop reason: HOSPADM

## 2022-04-07 RX ADMIN — CLOPIDOGREL BISULFATE 75 MG: 75 TABLET ORAL at 10:38

## 2022-04-07 RX ADMIN — CARVEDILOL 37.5 MG: 12.5 TABLET, FILM COATED ORAL at 10:38

## 2022-04-07 RX ADMIN — AMLODIPINE BESYLATE 5 MG: 5 TABLET ORAL at 10:39

## 2022-04-07 ASSESSMENT — ENCOUNTER SYMPTOMS
EYE PAIN: 0
NAUSEA: 0
CHEST TIGHTNESS: 0
ABDOMINAL PAIN: 0
SHORTNESS OF BREATH: 0
PHOTOPHOBIA: 0
COUGH: 0
VOMITING: 0
WHEEZING: 0
TROUBLE SWALLOWING: 0
ABDOMINAL DISTENTION: 0
COLOR CHANGE: 0
EYE DISCHARGE: 0

## 2022-04-07 ASSESSMENT — PAIN SCALES - GENERAL
PAINLEVEL_OUTOF10: 0

## 2022-04-07 NOTE — PLAN OF CARE
See OT evaluation for all goals and OT POC.  Electronically signed by JABIER Laboy/L on 4/7/2022 at 2:06 PM

## 2022-04-07 NOTE — PROGRESS NOTES
[facility] Respiratory Therapy Evaluation   Current Order: duoneb q4 prn     Home Regimen: prn     Ordering Physician: william  Re-evaluation Date: 4/9     Diagnosis: internuclear ophthalmoplegia       Patient Status: Stable / Unstable + Physician notified    The following MDI Criteria must be met in order to convert aerosol to MDI with spacer. If unable to meet, MDI will be converted to aerosol:  []  Patient able to demonstrate the ability to use MDI effectively  []  Patient alert and cooperative  []  Patient able to take deep breath with 5-10 second hold  []  Medication(s) available in this delivery method   []  Peak flow greater than or equal to 200 ml/min            Current Order Substituted To  (same drug, same frequency)   Aerosol to MDI [] Albuterol Sulfate 0.083% unit dose by aerosol Albuterol Sulfate MDI 2 puffs by inhalation with spacer    [] Levalbuterol 1.25 mg unit dose by aerosol Levalbuterol MDI 2 puffs by inhalation with spacer    [] Levalbuterol 0.63 mg unit dose by aerosol Levalbuterol MDI 2 puffs by inhalation with spacer    [] Ipratropium Bromide 0.02% unit dose by aerosol Ipratropium Bromide MDI 2 puffs by inhalation with spacer    [] Duoneb (Ipratropium + Albuterol) unit dose by aerosol Ipratropium MDI + Albuterol MDI 2 puffs by inhalation w/spacer   MDI to Aerosol [] Albuterol Sulfate MDI Albuterol Sulfate 0.083% unit dose by aerosol    [] Levalbuterol MDI 2 puffs by inhalation Levalbuterol 1.25 mg unit dose by aerosol    [] Ipratropium Bromide MDI by inhalation Ipratropium Bromide 0.02% unit dose by aerosol    [] Combivent (Ipratropium + Albuterol) MDI by inhalation Duoneb (Ipratropium + Albuterol) unit dose by aerosol       Treatment Assessment [Frequency/Schedule]:  Change frequency to: ______________________q4 prn____________________________per Protocol, P&T, MEC      Points 0 1 2 3 4   Pulmonary Status  Non-Smoker  []   Smoking history   < 20 pack years  []   Smoking history  ?  21 pack years  []   Pulmonary Disorder  (acute or chronic)  [x]   Severe or Chronic w/ Exacerbation  []     Surgical Status No [x]   Surgeries     General []   Surgery Lower []   Abdominal Thoracic or []   Upper Abdominal Thoracic with  PulmonaryDisorder  []     Chest X-ray Clear/Not  Ordered     [x]  Chronic Changes  Results Pending  []  Infiltrates, atelectasis, pleural effusion, or edema  []  Infiltrates in more than one lobe []  Infiltrate + Atelectasis, &/or pleural effusion  []    Respiratory Pattern Regular,  RR = 12-20 [x]  Increased,  RR = 21-25 []  MENDEZ, irregular,  or RR = 26-30 []  Decreased FEV1  or RR = 31-35 []  Severe SOB, use  of accessory muscles, or RR ? 35  []    Mental Status Alert, oriented,  Cooperative [x]  Confused but Follows commands []  Lethargic or unable to follow commands []  Obtunded  []  Comatose  []    Breath Sounds Clear to  auscultation  [x]  Decreased unilaterally or  in bases only []  Decreased  bilaterally  []  Crackles or intermittent wheezes []  Wheezes []    Cough Strong, Spontan., & nonproductive [x]  Strong,  spontaneous, &  productive []  Weak,  Nonproductive []  Weak, productive or  with wheezes []  No spontaneous  cough or may require suctioning []    Level of Activity Ambulatory [x]  Ambulatory w/ Assist  []  Non-ambulatory []  Paraplegic []  Quadriplegic []    Total  Score:____3___     Triage Score:___5_____      Tri       Triage:     1. (>20) Freq: Q3    2. (16-20) Freq: Q4   3. (11-15) Freq: QID & Albuterol Q2 PRN    4. (6-10) Freq: TID & Albuterol Q2 PRN    5. (0-5) Freq Q4prn

## 2022-04-07 NOTE — PROGRESS NOTES
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]   Chief Complaint   Patient presents with    Diplopia     since yesterday, R>L, sent by eye doctor     Patient Active Problem List    Diagnosis Date Noted    Lumbosacral radiculopathy at L5 2015    Spinal stenosis of lumbar region with neurogenic claudication 2015    High risk medication use-Norco - 17 OARRS PM&R, 18 OARRS PM&R, 18 OARRS PM&R, Urine Drug screen negative 17 PM&R--MED CONTRACT 2014    Brainstem stroke ([facility]     Internuclear ophthalmoplegia 2022    Hyperglycemia 2022    Sleep disturbance 2022    Auditory hallucinations 2022    Emotional lability 2022    Other microscopic hematuria 2022    Chronic combined systolic and diastolic CHF (congestive heart failure) ([address] 2022    Chronic kidney disease, stage 3b ([address] 2022    Diabetic renal disease ([address] 2022    Hypervolemia 2022    Chronic kidney disease, stage 4 (severe) ([address] 2022    Yeast infection 2021    Seizures ([address] 2021    Chronic kidney disease due to hypertension 2021    History of GI bleed 2021    Chronic right-sided thoracic back pain 2021    Port-A-Cath in place 2021    Infection due to ESBL-producing Escherichia coli     Palliative care encounter     Bradycardia     Moderate hypoxic-ischemic encephalopathy     Acute respiratory failure with hypoxia (HCC)     Gait abnormality 10/14/2020    Late effects of CVA (cerebrovascular accident) 10/14/2020    Major depressive disorder in remission ([facility] 10/06/2020    Gastroesophageal reflux disease without esophagitis 10/06/2020    Acute cystitis with hematuria 10/06/2020    Anemia of chronic renal failure 08/25/2020    Dysarthria     Other fatigue     Anemia     AVM (arteriovenous malformation)     HOCM (hypertrophic obstructive cardiomyopathy) ([address] 11/11/2019    Chronic obstructive pulmonary disease ([address] 10/12/2019    Uncontrolled type 2 diabetes mellitus with hyperglycemia (HCC)     Acute combined systolic and diastolic CHF, NYHA class 1 ([address] 03/24/2019    Myalgia 05/11/2018    Diabetic asymmetric polyneuropathy ([address] 04/11/2017    Impaired mobility and activities of daily living 03/28/2015    Artificial lens present 10/03/2014    Presbyopia 10/03/2014    Astigmatism, regular 10/03/2014    Cataract, nuclear sclerotic senile 10/03/2014    Regular astigmatism 10/03/2014    Nuclear senile cataract 10/03/2014    Memory deficit 06/04/2014    Chronic UTI 06/04/2014    SOB (shortness of breath) on exertion 03/14/2014    CTS (carpal tunnel syndrome) 02/09/2014    DJD (degenerative joint disease), lumbar 02/08/2014    Lumbosacral radiculopathy at S1 02/08/2014    Dysphonia 02/08/2014    Hypertension     Hyperlipidemia     Fibromyalgia     Anxiety     Depression       Past Medical History:   Diagnosis Date    Adenomatous polyp of sigmoid colon     Adenomatous polyp of transverse colon     Anxiety     Asthma     dx 2019 / has smoked since age 11    Brainstem stroke ([address]     Cardiopulmonary arrest ([address]     CHF (congestive heart failure) ([address]     Chronic back pain     Bilateral L5 S1 Radic on emg--surprisingly worse on the left than the right--pt's symptoms and her MRI show worse on the right    Chronic obstructive pulmonary disease with acute exacerbation ([address] 10/12/2019    Depression     ESBL E. coli carrier     Carrier.     Fibromyalgia     Gastrointestinal hemorrhage 2/24/2020    Hyperlipidemia     meds > 8 yrs    Hypertension     meds > 45 yrs    Insomnia 12/4/2013    On home O2     2l per n/c at bedtime mostly,     Osteoarthritis     Seizures ([facility]     Sepsis ([facility] 10/6/2020    Smoker 6/18/2013    Type II diabetes mellitus, uncontrolled ([facility]     hx > 8 yrs    Unspecified sleep apnea      Past Surgical History:   Procedure Laterality Date    BACK SURGERY  2017    lumbar disc    CARDIAC CATHETERIZATION  11/03/2014    DR. MIRELES / no stents    COLONOSCOPY  08/29/2016    w/polypectomy     COLONOSCOPY N/A 09/29/2020    COLONOSCOPY WITH POLYPECTOMY performed by Buddy Augustin MD at [facility] N/A 10/07/2019    EUA HYSTEROSCOPY DILATATION AND CURETTAGE performed by Luz Heller DO at [facility]. [address], COLON, DIAGNOSTIC      EYE SURGERY      [place] with IOL OU / [id number]    umbilical hernia repair    IR PORT PLACEMENT EQUAL OR GREATER THAN 5 YEARS  5/14/2021    IR PORT PLACEMENT EQUAL OR GREATER THAN 5 YEARS 5/14/2021 MLOZ SPECIAL PROCEDURE    MS ESOPHAGOGASTRODUODENOSCOPY TRANSORAL DIAGNOSTIC N/A 03/24/2017    EGD ESOPHAGOGASTRODUODENOSCOPY performed by Payton Bruno MD at [place] [address] 02/08/2018    negative findings    MS REVISE MEDIAN N/CARPAL TUNNEL SURG Left 06/05/2017    LEFT  CARPAL TUNNEL RELEASE performed by Reno Conner MD at [facility],9+Y/W,WVMLZ N/A 02/08/2018    TONSILLECTOMY      as child    TUNNELED VENOUS PORT PLACEMENT Right 05/14/2021    [employer] Port ClearVUE by Dr. Wang Last  08/26/2016    w/bx     UPPER GASTROINTESTINAL ENDOSCOPY N/A 02/25/2020    EGD possible biopsy performed by Buddy Augustin MD at [address] [place] [address] 07/01/2020    EGD PUSH ENTEROSCOPY performed by Declan Clarke MD at [facility]     Chart Reviewed: Yes  Patient assessed for rehabilitation services?: Yes  Family / Caregiver Present: No  General Comment  Comments: Pt resting in bed, agreeable to PT eval    Restrictions:  Restrictions/Precautions: Fall Risk,Contact Precautions (High per edmond, ESBL urine)     SUBJECTIVE:   Pt reports double vision resolved, denies pain currently    Pain  Pre Treatment Pain Screening  Pain at present: 0  Scale Used: Numeric Score    Post Treatment Pain Screening:   Pain Screening  Patient Currently in Pain: No  Pain Assessment  Pain Assessment: 0-10  Pain Level: 0    Prior Level of Function:  Social/Functional History  Lives With: Spouse (Daughter daily)  Type of Home: House  Home Layout: Two level,1/2 bath on main level,Bed/Bath upstairs,Laundry in basement (Stair lift to second floor)  Home Access: Stairs to enter without rails  Entrance Stairs - Number of Steps: 2  Bathroom Shower/Tub: Walk-in shower  Bathroom Equipment: Shower chair,Grab bars in shower,Hand-held shower  Home Equipment: Cane,Electric scooter (rollator)  ADL Assistance: Independent  Homemaking Assistance: Independent  Ambulation Assistance: Independent  Transfer Assistance: Independent  Active : No  Patient's  Info: Spouse or daughter  Occupation: Retired  Additional Comments: Uses mobility scooter in community    OBJECTIVE:   Vision Exceptions: Wears glasses at all times (pt reports diplopia resolved pt's report)    Cognition:  Overall Orientation Status: Within Functional Limits  Follows Commands: Within Functional Limits    Observation/Palpation  Posture: Fair (age related flexion changed, forward head, rounded shoulders, and minimal flexed forward posture)  Observation: pleasant,c ooperative, responds appropriately    ROM:  RLE AROM: WFL  LLE AROM : WFL    Strength:  Strength RLE  Comment: hip 4/5, knee 4+/5, ankle4/5  Strength LLE  Comment: hip 4/5, knee 4+/5, ankle4/5    Neuro:  Balance  Sitting - Static: Good  Sitting - Dynamic: Good  Standing - Static: Good;-  Standing - Dynamic: Fair;+  Comments: Pt and dtr state pt is at her baseline     Motor Control  Gross Motor?: WFL       Bed mobility  Supine to Sit: Modified independent  Sit to Supine: Modified independent    Transfers  Sit to Stand: Modified independent  Stand to sit: Modified independent    Ambulation  Ambulation?: Yes  Ambulation 1  Surface: level tile  Device: Rolling Walker  Assistance: Modified Independent  Quality of Gait: decreased  Distance: 45ft  Comments: with turns. Pt and dtr state that she is ambulating at her baseline        Activity Tolerance  Activity Tolerance: Patient Tolerated treatment well      PT Education  PT Education: PT Role;Goals    ASSESSMENT:   Body structures, Functions, Activity limitations: Decreased functional mobility ; Decreased strength;Decreased balance  Decision Making: Medium Complexity  History: med  Exam: low  Clinical Presentation: low  No Skilled PT: At baseline function    Prognosis: Good  Barriers to Learning: none    DISCHARGE RECOMMENDATIONS:  No Skilled PT: At baseline function    Assessment: Pt presents for CVA work up. Pt reports resolution of double vision. Denies numbness/tingling. Pt ambulating with 2ww in room distances at her previous baseline per pt and dtr. Pt has no further acute PT needs at this time. REQUIRES PT FOLLOW UP: No      PLAN OF CARE:  Plan  Times per week: eval only  Safety Devices  Type of devices: Left in bed,Call light within reach,Bed alarm in place    Goals:  Patient goals : to go home    [facility] (6 CLICK) [address] 28 Inpatient Mobility Raw Score : 23     Therapy Time:   Individual   Time In 1336   Time Out 1346   Minutes [id number] [address], 04/07/22 at 2:46 PM       Definitions for assistance levels  Independent = pt does not require any physical supervision or assistance from another person for activity completion. Device may be needed.   Stand by assistance = pt requires verbal cues or instructions from another person, close to but not touching, to perform the activity  Minimal assistance= pt performs 75% or more of the activity; assistance is required to complete the activity  Moderate assistance= pt performs 50% of the activity; assistance is required to complete the activity  Maximal assistance = pt performs 25% of the activity; assistance is required to complete the activity  Dependent = pt requires total physical assistance to accomplish the task

## 2022-04-07 NOTE — DISCHARGE INSTR - DIET

## 2022-04-07 NOTE — PLAN OF CARE
Problem: Falls - Risk of:  Goal: Will remain free from falls  Description: Will remain free from falls  Outcome: Ongoing  Goal: Absence of physical injury  Description: Absence of physical injury  Outcome: Ongoing     Problem: Pain:  Goal: Pain level will decrease  Description: Pain level will decrease  Outcome: Ongoing  Goal: Control of acute pain  Description: Control of acute pain  Outcome: Ongoing  Goal: Control of chronic pain  Description: Control of chronic pain  Outcome: Ongoing     Problem: HEMODYNAMIC STATUS  Goal: Patient has stable vital signs and fluid balance  Outcome: Ongoing     Problem: ACTIVITY INTOLERANCE/IMPAIRED MOBILITY  Goal: Mobility/activity is maintained at optimum level for patient  Outcome: Ongoing     Problem: COMMUNICATION IMPAIRMENT  Goal: Ability to express needs and understand communication  Outcome: Ongoing

## 2022-04-07 NOTE — CARE COORDINATION
[facility] [place] Case Management Initial Discharge Assessment    Met with Patient and Family to discuss discharge plan. MET WITH PATIENT, PT DAUGHTER AT BEDSIDE      PCP: Alber Hernández MD                                Date of Last Visit: ABOUT 3WK AGO    [facility] Patient: No        VA Notified: no    If no PCP, list provided? N/A    Discharge Planning    Living Arrangements: independently at home    Who do you live with? DAUGHTER AND SPOUSE    Who helps you with your care:  self or family    If lives at home:     Do you have any barriers navigating in your home? no    Patient can perform ADL? Yes    Current Services (outpatient and in home) :  Palliative Care ([facility])    Dialysis: No    Is transportation available to get to your appointments? Yes    DME Equipment:  yes - WALKER CANE WC SCOOTER ELEVATOR CHAIR    Respiratory equipment: None    Respiratory provider:  no     Pharmacy:  yes - Romy James with Medication Assistance Program?  No      Patient agreeable to [facility] ? Declined    Patient agreeable to SNF/Rehab? Declined    Other discharge needs identified? Palliative Care- CONT[facility]    Does Patient Have a High-Risk for Readmission Diagnosis (CHF, PN, MI, COPD)? No        Initial Discharge Plan? (Note: please see concurrent daily documentation for any updates after initial note). RETURN HOME W/SPOUSE, DTR. CURRENT WITH [facility]. HAS DME EQUIPMENT AS LISTED ABOVE. DENIES FURTHER NEEDS.     Readmission Risk              Risk of Unplanned Readmission:  22         Electronically signed by Michael Post RN on 4/7/2022 at 10:38 AM

## 2022-04-07 NOTE — DISCHARGE SUMMARY
Physician Discharge Summary     Patient ID:  Rosana Hodgkins  [id number]  9 y.o.  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 female with significant past medical history of HTN/HLD, DM2, depression, CHF, seizures who presents from ophthalmology office with concern for new CVA after found to have internuclear ophthalmoplegia which symptoms started yesterday. Ophthalmologist was concerned with this new finding and called Dr. Catrina Marsh who believed pt likely had a new CVA and recommended admission to the hospital for MRI and to start on plavix with the intention of starting renally dosed Eliquis tomorrow. Pt without any other focal complaints - no facial droop, no weakness, no slurred speech. Labs baseline. CT head negative. Pt had an MRI back in Feb which was negative for acute processes at that time. Pt admitted for workup of new onset diplopia. 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 Plavix discontinued by neuro and patient started on low dose Eliquis and neuro ok with discharge. Pt discharged home in stable and improved condition on 4/7 in stable and improved condition. Consults: neurology    Discharge Exam:  Constitutional: Awake and alert in no acute distress. Lying in bed comfortably  Head: Normocephalic, atraumatic  Eyes: Disconjugate gaze improved.  R eye moving past midline medially today  ENT: moist mucous membranes  Neck: neck supple, trachea midline  Lungs: Good inspiratory effort, no wheeze, no rhonchi, no scan the chest as described above      All CT scans at this facility use dose modulation, iterative reconstruction, and/or weight based dosing when appropriate to reduce radiation dose to as low as reasonably achievable. Examination: CT ABDOMEN PELVIS WO CONTRAST, CT CHEST WO CONTRAST    Indication:   flank pain    Technique: Multiple serial axial images was performed through the abdomen and pelvis without intravenous or oral administration of contrast..   Images were reconstructed in the axial and coronal and sagittal planes. Comparison: September 9, 2019    Findings: The liver, gallbladder, spleen, pancreas, adrenals,  are unremarkable. The kidneys show no significant perinephric stranding. No nephrolithiasis. No hydronephrosis or hydroureter. No bladder calculi. Large and small bowel show no sign of obstruction. The appendix is not visualized. No pericecal stranding. No diverticulitis. No free air. No free fluid. The visualized abdominal aorta is of normal size and caliber. No significant retroperitoneal adenopathy. There is multilevel degenerative changes of lumbar spine. There is a grade 1 anterolisthesis of L4 and L5. There is narrowing of the L5-S1 disc space. Impression: UNREMARKABLE CT SCAN OF THE ABDOMEN AND PELVIS AS DESCRIBED ABOVE      All CT scans at this facility use dose modulation, iterative reconstruction, and/or weight based dosing when appropriate to reduce radiation dose to as low as reasonably achievable. CXR      2-view: Results for orders placed during the hospital encounter of 08/24/20    XR CHEST (2 VW)    Narrative  EXAMINATION: XR CHEST (2 VW)    CLINICAL HISTORY:  sob    COMPARISONS: None    FINDINGS:    Two views of the chest are submitted. The cardiac silhouette is enlarged  Pulmonary vascular congestion with increased interstitial markings. Right sided trachea. No focal infiltrates. No effusions. No Pneumothoraces.     Impression  PULMONARY VASCULAR CONGESTION WITH INCREASED INTERSTITIAL MARKINGS. RADIOGRAPHIC FINDINGS COULD SUGGEST EARLY CHF. CHF. CORRELATE CLINICALLY      Results for orders placed in visit on 20    XR CHEST STANDARD (2 VW)    Narrative  Patient MRN: [id number]    : 1945    Age:  75 years    Gender: Female    Order Date: 2020 11:23 AM.    Exam: XR CHEST (2 VW)    Number of Views: 2    Indication:  Shortness of breath chronic cough. Comparison: 10/30/2019    Findings: Bibasilar airspace disease. Cardiac mediastinal silhouette unchanged. Vascular calcifications thoracic aorta. No pneumothorax. Impression  Impression:  Suspected bibasilar infiltrate/pneumonia. Portable: Results for orders placed during the hospital encounter of 21    XR CHEST PORTABLE    Narrative  EXAMINATION: XR CHEST PORTABLE    CLINICAL HISTORY: SHORTNESS OF BREATH    COMPARISONS: 2021, 2021    FINDINGS: Image obtained in partial expiration. Interval removal, right head. Osseous structures intact. Cardiopericardial silhouette normal in size. Aorta calcified. Lungs clear. Impression  NO ACUTE CARDIOPULMONARY DISEASE      Echo No results found for this or any previous visit.       Disposition: home    In process/preliminary results:  Outstanding Order Results     Date and Time Order Name Status Description    2022 10:17 AM MRI brain without contrast In process           Patient Instructions:   Current Discharge Medication List      START taking these medications    Details   apixaban (ELIQUIS) 2.5 MG TABS tablet Take 1 tablet by mouth 2 times daily  Qty: 60 tablet, Refills: 3         CONTINUE these medications which have NOT CHANGED    Details   carvedilol (COREG) 25 MG tablet TAKE 1 & 1/2 (ONE & ONE-HALF) TABLETS BY MOUTH TWICE DAILY WITH MEALS  Qty: 270 tablet, Refills: 0      PARoxetine (PAXIL) 40 MG tablet TAKE 1 TABLET BY MOUTH ONCE DAILY IN THE MORNING  Qty: 90 tablet, Refills: 1      insulin lispro (HUMALOG) 100 UNIT/ML injection vial INJECT 15 UNITS SUBCUTANEOUSLY WITH EACH MEAL  Qty: 10 mL, Refills: 3      ACCU-CHEK PHYLLIS PLUS strip Test 3x daily Dx E11.65  Qty: 100 each, Refills: 3      nitrofurantoin (MACRODANTIN) 50 MG capsule Take 1 capsule by mouth nightly  Qty: 90 capsule, Refills: 3      amLODIPine (NORVASC) 5 MG tablet Take 1 tablet by mouth daily  Qty: 90 tablet, Refills: 3      glucose 4 g chewable tablet Take 4 tablets by mouth as needed for Low blood sugar  Qty: 60 tablet, Refills: 3      Glucagon (GVOKE HYPOPEN 2-PACK) 1 MG/0.2ML SOAJ Inject 1 mg into the skin every 15 minutes as needed (glucose less tahtn 70 or if symptomatic)  Qty: 2 each, Refills: 3      Continuous Blood Gluc Sensor (DEXCOM G6 SENSOR) MISC 1 Device by Does not apply route continuous  Qty: 3 each, Refills: 11      Continuous Blood Gluc Transmit (DEXCOM G6 TRANSMITTER) MISC 1 Device by Does not apply route continuous  Qty: 1 each, Refills: 3      Continuous Blood Gluc  (DEXCOM G6 ) [place] 1 Device by Does not apply route 4 times daily (before meals and nightly)  Qty: 1 each, Refills: 0      insulin glargine (LANTUS) 100 UNIT/ML injection vial 70 units at bedtime  Qty: 3 each, Refills: 3    Associated Diagnoses: Type 2 diabetes mellitus with other specified complication, with long-term current use of insulin (formerly [facility])      digoxin (LANOXIN) 125 MCG tablet Take 1 tablet by mouth every other day  Qty: 45 tablet, Refills: 3      albuterol-ipratropium (COMBIVENT RESPIMAT)  MCG/ACT AERS inhaler Inhale 1 puff into the lungs every 6 hours as needed for Wheezing or Shortness of Breath  Qty: 4 g, Refills: 5    Associated Diagnoses: Chronic obstructive pulmonary disease, unspecified COPD type (formerly [facility])      ipratropium-albuterol (DUONEB) 0.5-2.5 (3) MG/3ML SOLN nebulizer solution Inhale 3 mLs into the lungs 3 times daily  Qty: 360 mL, Refills: 0      Cranberry 500 MG CAPS Take 500 mg by mouth daily      pantoprazole (PROTONIX) 40 MG tablet Take 1 tablet by mouth every morning (before breakfast)  Qty: 90 tablet, Refills: 3      Cholecalciferol (VITAMIN D3) 50 MCG (2000 UT) CAPS Take 1,000 Units by mouth 2 times daily       lidocaine-prilocaine (EMLA) 2.5-2.5 % cream APPLY CREAM TOPICALLY TO PORT SITE ONE HOUR PRIOR TO APPOINTMENT AS NEEDED      Handicap Placard MISC by Does not apply route Good from 7/15/21 - 7/15/26  Qty: 1 each, Refills: 0    Associated Diagnoses: Cardiopulmonary arrest ([facility] Acute combined systolic and diastolic CHF, NYHA class 1 ([facility] HOCM (hypertrophic obstructive cardiomyopathy) ([facility] Lumbosacral radiculopathy at S1; Chronic obstructive pulmonary disease with acute exacerbation ([facility] Fibromyalgia      rosuvastatin (CRESTOR) 40 MG tablet TAKE 1 TABLET BY MOUTH ONCE DAILY IN THE EVENING  Qty: 90 tablet, Refills: 3    Associated Diagnoses: Pure hypercholesterolemia      Accu-Chek Softclix Lancets MISC bid  Qty: 100 each, Refills: 3      Blood Glucose Monitoring Suppl (ACCU-CHEK PHYLLIS CONNECT) w/Device KIT 1 kit by Does not apply route daily  Qty: 1 kit, Refills: 0         STOP taking these medications       divalproex (DEPAKOTE) 500 MG DR tablet Comments:   Reason for Stopping:             Activity: activity as tolerated  Diet: diabetic diet  Wound Care: none needed    Follow-up with Angelito Miller MD  in 1 week.     DC time 35 minutes    Signed:  Electronically signed by [facility] [place] on 4/7/2022 at 3:31 PM

## 2022-04-07 NOTE — CONSULTS
[id number] [facility] Neurology Consult Note  Name: Tree Kumar  Age: 67 y.o. Gender: female  CodeStatus: DNR-CCA  Allergies: Ibuprofen  Metformin And Related  Darvon [Propoxyphene Hcl]    Chief Complaint:Diplopia (since yesterday, R>L, sent by eye doctor)    Primary Care Provider: Jenny Singh MD  InpatientTreatment Team: Treatment Team: Attending Provider: Sarah Torrez DO; Consulting Physician: Laurent Gilmore MD; Nursing Student: Aleah Oscar; Registered Nurse: Sydney De Leon RN  Admission Date: 4/6/2022      HPI   Consulting provider: Dr. Sarah Torrez for diplopia      Pt seen and examined for neurology consult. Patient is a 67-year-old -American female with past medical history of diabetes mellitus type 2, obstructive sleep apnea, tobacco abuse, sepsis, seizures, hypertension, hyperlipidemia, GI bleed, depression, COPD, CHF, chronic back pain, cardiopulmonary arrest, anxiety who presented to [facility] emergency room on 4/6/2022 for diplopia. Patient had woken up the day prior with double vision. Patient's daughter had taken her to the eye doctor who diagnosed her with internuclear ophthalmoplegia. Patient did call Dr. Bakari Castaneda office on 4/6/2022 who instructed her to come to the emergency room to rule out a stroke. Patient did not have any other associated focal neuro deficits. CT the head was in the emergency room yesterday and negative for any acute intracranial process. She does have a small area of right parietal region encephalomalacia and remote left basal ganglia infarct with tiny bilateral basal ganglia calcifications. MRI of the brain is pending.   Patient has been initiated on Plavix 75 mg daily and atorvastatin 80 mg nightly    Laboratory testing: Magnesium 2.3,WBC 7.7, hemoglobin 12.2, hematocrit 37.7, platelets 405, sodium 140, potassium 4.5, chloride 105, CO2 21, anion gap 14, glucose 302, BUN 22, creatinine 1.94, calcium 10.0, alk phos 106, ALT 18, AST 24, INR 1, digoxin level 0.8,    Patient is currently alert and oriented x3, no acute distress, cooperative. She reports that she developed a sudden onset of double vision 2 days prior. Per documentation above she went to eye doctor who was concerned for EFRAIN. Patient reports she had difficulty moving her right eye to the left. Double vision was only in the right eye. Currently her vision is back to normal.  There is no problems with adduction of the eye noted. There is no ptosis. Pupil responses normal.  No other focal neuro deficits. Blood pressure slightly elevated at times. Blood sugars elevated in the 200-300 range. Hemoglobin A1c 12.8. Low HDL of 33 with LDL of 36. Last echocardiogram done 3/18/2021 showed ejection fraction of 50% with some suggestion of diastolic dysfunction. No significant valvular abnormalities. Severe left ventricular hypertrophy noted. History noted and patient was discussed with emergency room yesterday. MRI was reviewed and patient has a paramedian pontine stroke though her EFRAIN has improved. She is very adamant that she goes home today and would not stay. Carotid ultrasound shows no significant stenosis. Vitals:    04/07/22 0820   BP: (!) 152/63   Pulse: 79   Resp: 18   Temp: 98.1 °F (36.7 °C)   SpO2: 97%      Review of Systems   Constitutional: Negative for appetite change, chills, fatigue and fever. HENT: Negative for hearing loss and trouble swallowing. Eyes: Positive for visual disturbance. Negative for photophobia, pain and discharge. Respiratory: Negative for cough, chest tightness, shortness of breath and wheezing. Cardiovascular: Negative for chest pain, palpitations and leg swelling. Gastrointestinal: Negative for abdominal distention, abdominal pain, nausea and vomiting. Genitourinary: Negative for difficulty urinating. Musculoskeletal: Negative for gait problem, neck pain and neck stiffness. Skin: Negative for color change and rash.    Neurological: Negative for dizziness, tremors, seizures, syncope, facial asymmetry, speech difficulty, weakness, light-headedness, numbness and headaches. Psychiatric/Behavioral: Negative for agitation, confusion and hallucinations. The patient is not nervous/anxious. Physical Exam  Vitals and nursing note reviewed. Constitutional:       General: She is not in acute distress. Appearance: She is not ill-appearing or diaphoretic. HENT:      Head: Normocephalic and atraumatic. Eyes:      General: No visual field deficit. Right eye: No discharge. Left eye: No discharge. Extraocular Movements: Extraocular movements intact. Pupils: Pupils are equal, round, and reactive to light. Cardiovascular:      Rate and Rhythm: Normal rate and regular rhythm. Pulmonary:      Effort: Pulmonary effort is normal. No respiratory distress. Breath sounds: Normal breath sounds. Abdominal:      General: Bowel sounds are normal. There is no distension. Palpations: Abdomen is soft. Tenderness: There is no abdominal tenderness. Skin:     General: Skin is warm and dry. Neurological:      General: No focal deficit present. Mental Status: She is alert and oriented to person, place, and time. Mental status is at baseline. Cranial Nerves: No cranial nerve deficit, dysarthria or facial asymmetry. Sensory: No sensory deficit. Motor: No weakness, tremor, atrophy, abnormal muscle tone, seizure activity or pronator drift.       Coordination: Coordination normal. Finger-Nose-Finger Test normal.         Exam improved and I do not see any EFRAIN though she has some slow movements in the lateral gaze on the left      Medications:  Reviewed    Infusion Medications:    dextrose       Scheduled Medications:    LORazepam  0.5 mg IntraVENous Once    atorvastatin  80 mg Oral Nightly    clopidogrel  75 mg Oral Daily    insulin lispro  0-6 Units SubCUTAneous TID     insulin lispro  0-3 Units SubCUTAneous Nightly    carvedilol  37.5 mg Oral BID WC    PARoxetine  40 mg Oral Daily    insulin lispro  15 Units SubCUTAneous TID WC    amLODIPine  5 mg Oral Daily    insulin glargine  50 Units SubCUTAneous Nightly    digoxin  125 mcg Oral Every Other Day    pantoprazole  40 mg Oral QAM AC    enoxaparin  30 mg SubCUTAneous Daily     PRN Meds: ondansetron **OR** ondansetron, polyethylene glycol, acetaminophen **OR** acetaminophen, labetalol, ipratropium-albuterol, glucose, glucagon (rDNA), dextrose, dextrose bolus (hypoglycemia) **OR** dextrose bolus (hypoglycemia)    Labs:   Recent Labs     04/06/22  1430 04/07/22  0529   WBC 7.7 7.1   HGB 12.2 12.0   HCT 37.7 37.7    298     Recent Labs     04/06/22  1430 04/07/22  0529    138   K 4.5 4.1    104   CO2 21 21   BUN 22 24*   CREATININE 1.94* 1.84*   CALCIUM 10.0* 9.7     Recent Labs     04/06/22  1430   AST 24   ALT 18   BILITOT 0.3   ALKPHOS 106     Recent Labs     04/06/22  1430   INR 1.0     No results for input(s): CKTOTAL, TROPONINI in the last 72 hours. Urinalysis:   Lab Results   Component Value Date    NITRU Negative 01/14/2022    WBCUA 10-20 01/14/2022    BACTERIA MANY 01/14/2022    RBCUA 0-2 01/14/2022    BLOODU moderate 02/11/2022    BLOODU SMALL 01/14/2022    SPECGRAV >=1.030 02/11/2022    SPECGRAV 1.020 01/14/2022    GLUCOSEU >=1000 mg/dL 02/11/2022    GLUCOSEU >=1000 01/14/2022       Radiology:   Most recent    EEG No valid procedures specified. MRI of Brain No results found for this or any previous visit. Results for orders placed during the hospital encounter of 02/28/22    MRI BRAIN WO CONTRAST    Narrative  EXAMINATION:  MRI BRAIN WO CONTRAST    HISTORY:  Seizures    TECHNIQUE:  MRI brain routine protocol without contrast.    COMPARISON:  CT brain 4/1/2021, MRI brain 8/27/2020. RESULT:    Examination is degraded by motion. Acute Change:  No evidence of an acute intracranial process.     Hemorrhage:  Multiple foci of susceptibility throughout the supratentorial and infratentorial brain, likely secondary to chronic microvascular hemorrhages. Mass Lesion/ Mass Effect:  No evidence of an intracranial mass or extra-axial fluid collection. No significant mass effect. Chronic Change:  Scattered patchy and confluent areas of increased T2 and FLAIR signal are present in the supratentorial white matter which is nonspecific but likely represents chronic microvascular ischemia. Encephalomalacia and gliosis of the left  hemipons and right parietal lobe, secondary to prior infarcts. .    Parenchyma: There is moderate generalized parenchymal volume loss. Ventricles:  Ventriculomegaly corresponds to the degree of parenchymal volume loss. Skull Base:  Hypothalamic and pituitary region are grossly normal. Craniocervical junction is normal. No significant marrow replacement process. Vasculature:  Major intracranial arteries and dural venous sinuses demonstrate typical flow voids, suggesting patency by spin echo criteria. Other:  Mild paranasal sinus mucosal thickening. Trace mastoid effusions. Status post bilateral lens replacements. Impression  No acute intracranial abnormality; intracranial mass, intracranial hemorrhage or extra-axial collection. Sequela of moderate chronic microvascular ischemia, prior infarct and involutional changes. MRA of the Head and Neck: No results found for this or any previous visit. No results found for this or any previous visit. No results found for this or any previous visit. CT of the Head: Results for orders placed during the hospital encounter of 04/06/22    CT Head WO Contrast    Narrative  EXAMINATION: CT of the brain without contrast    HISTORY: Diplopia. Blurred vision. COMPARISON: CT brain April 1, 2021    TECHNIQUE: Multiple contiguous axial images were obtained of the brain from the skull base through the vertex. Multiplanar reformats were obtained. FINDINGS:    Prominence of the sulci and ventricles compatible with mild generalized parenchymal volume loss. Areas of bilateral supratentorial white matter hypoattenuation are nonspecific but most likely related to chronic small vessel ischemic changes in a patient  of this age. Gray-white matter differentiation is preserved. Small area of right parietal region encephalomalacia and remote left basal ganglia lacunar infarct noted. Tiny bilateral basal ganglia calcifications. No acute hemorrhage or abnormal extra-axial fluid collection. No mass effect or midline shift. The visualized paranasal sinuses and mastoid air cells are clear. Calvarium is intact. Impression  No acute intracranial process. All CT scans at this facility use dose modulation, iterative reconstruction, and/or weight based dosing when appropriate to reduce radiation dose to as low as reasonably achievable. No results found for this or any previous visit. No results found for this or any previous visit. Carotid duplex: No results found for this or any previous visit. No results found for this or any previous visit.   Results for orders placed during the hospital encounter of 08/24/20    US CAROTID ARTERY BILATERAL    Narrative  EXAMINATION:  CAROTID DUPLEX ULTRASONOGRAPHY    CLINICAL HISTORY:  MENTAL STATUS CHANGES, CONFUSION    COMPARISONS:  March 8, 2019    TECHNIQUE:  B-mode, color flow and spectral Doppler    FINDINGS:    ARTERIAL BLOOD FLOW VELOCITY    RIGHT PS    Prox CCA    92 cm/s  Mid CCA     99 cm/s  Dist CCA    78 cm/s  Prox ICA    94 cm/s  Mid ICA     80 cm/s  Dist ICA    232 cm/s  Prox ECA    70 cm/s  Prox VERT   92 cm/s    ICA/CCA     2.3    LEFT PS    Prox CCA    153 cm/s  Mid CCA     101 cm/s  Dist CCA    92 cm/s  Prox ICA    93 cm/s  Mid ICA     95 cm/s  Dist ICA    146 cm/s  Prox ECA    140 cm/s  Prox VERT   71 cm/s    ICA/CCA     1.4    Impression  SCATTERED ATHEROSCLEROSIS AND DIFFUSE INTIMAL THICKENING NOTED BILATERAL ICA AND COMMON CAROTIDS. BILATERAL ICAS ARE VERY TORTUOUS. BILATERAL ANTEGRADE VERTEBRAL FLOW    RIGHT ICA BY VELOCITIES APPROXIMATELY 70% HOWEVER THIS MAY BE OVERESTIMATED DUE TO THE TORTUOSITY OF THE ICA. LEFT ICA 50-69% STENOSIS. Echo No results found for this or any previous visit. Assessment/Plan:  Diplopia of right eye with concern for EFRAIN. Patient had sudden onset of diplopia to her right eye for which she was examined by the eye doctor with concern for EFRAIN. Apparently there was difficulty with adduction of the right eye. This has since resolved and diplopia since resolved. There is no associated ptosis or pupil dilation. There is noted horizontal nystagmus of the left eye with abduction. No other focal neuro deficits. Initial CT the head was negative for any acute findings. MRI of the brain remains pending. Patient does have multiple risk factors for CVD including hypertension, hyperlipidemia, diabetes with a hemoglobin A1c of 12.8. HDL is low at 33 with good LDL of 36. Patient was not on antiplatelets prior to admission. Patient has been initiated on Plavix 75 mg daily. She does have history of GI bleed which would make Plavix a better option for her at this time. Will obtain carotid duplex given her vision changes and concern for CVA. Patient will need ongoing attention to risk factor modification. Discussed this at length with patient and her family. Further recommendations to follow pending MRI and carotid duplex results. I have personally performed a face to face diagnostic evaluation on this patient, reviewed all data and investigations, and am the sole provider of all clinical decisions on the neurological status of this patient. Exam as noted patient has some slow movements on lateral gaze but no EFRAIN is noted today.   Patient sustained a paramedian stroke in the pontine area explaining her symptoms of MLF involvement. Patient has multiple risk factors for cerebrovascular disease with a hemoglobin A1c of 12. Patient had GI bleeds and was not on antiplatelet agents. The safe medication for now would be Eliquis 2.5 mg twice a day with less chances of GI erosions and GI bleed though there is no guarantee that this may not occur. We are not transfer to treat her to prevent catastrophic stroke. We did discuss the bleeding risks and pros and cons of anticoagulation in a patient with stroke. We would recommend that the patient stay for a day to make sure she does not sustain any of the findings though she refuses to do that. This consultation includes my consultation the emergency room as well        Mehul Denise MD, Omega Rodriguez, American Board of Psychiatry & Neurology  Board Certified in Vascular Neurology  Board Certified in Neuromuscular Medicine  Certified in Neurorehabilitation       Collaborating physicians: Dr Adriana Denise    Electronically signed by JOHNATHON Quevedo CNP on 4/7/2022 at 10:21 AM

## 2022-04-07 NOTE — PROGRESS NOTES
Physical Therapy   Facility/Department: [facility] MED SURG C347/B450-40    NAME: Ileana Jackman    : 1945 (67 y.o.)  MRN: [id number]    Account: [de-identified]  Gender: female    PT evaluation and treatment orders received. Chart reviewed. PT eval attempted. Pt off unit for MRI during time of attempt. Discussed with Jose Luis Montez RN. Will check back. Will attempt PT evaluation again at earliest convenience.       Electronically signed by Verónica Ham PT on 22 at 9:53 AM EDT

## 2022-04-07 NOTE — ADT AUTH CERT
Utilization Reviews         Neurology [address] Day 2 (4/7/2022) by Earl Sales RN       Review Status Review Entered   Completed 4/7/2022 15:18      Criteria Review      Care Day: 2 Care Date: 4/7/2022 Level of Care:    Guideline Day 2    Level Of Care    (X) Floor    4/7/2022 3:18 PM EDT by Beth Iqbal      neuro/tele unit    Clinical Status    ( ) * No ICU or intermediate care needs    Interventions    (X) Inpatient interventions continue    4/7/2022 3:18 PM EDT by Beth Iqbal      lab and vitals monitoring, NIHSS - Every 4 hours for 6 occurrences, and then every shift or at handoff. * Milestone   Additional Notes   DATE:   04/07/2022 Care day 2         Relevant baselines: (lab values, vitals, o2 amount/delivery, etc.)   2L/NC          Pertinent Updates:   patient has a paramedian pontine stroke though her EFRAIN has improved - Eliquis ordered         Vitals:  152/6379, 18, 98.1, 97% on RA            Abnl/Pertinent Labs/Radiology/Diagnostic Studies:   MRI - per neurologist read - paramedian pontine stroke -  final results pending    B/Cr 24/1.84   GFR 32.1   Glucose 230, 249, 258, 279, 252, 249   HDL 33   Triglycerides 183   A1C 12.8             Physical Exam:   Vitals and nursing note reviewed. Constitutional:        General: She is not in acute distress.      Appearance: She is not ill-appearing or diaphoretic. HENT:       Head: Normocephalic and atraumatic. Eyes:       General: No visual field deficit.         Right eye: No discharge.          Left eye: No discharge.       Extraocular Movements: Extraocular movements intact.       Pupils: Pupils are equal, round, and reactive to light. Cardiovascular:       Rate and Rhythm: Normal rate and regular rhythm. Pulmonary:       Effort: Pulmonary effort is normal. No respiratory distress.       Breath sounds: Normal breath sounds.     Abdominal:       General: Bowel sounds are normal. There is no distension.       Palpations: Abdomen is soft.       Tenderness: There is no abdominal tenderness. Skin:      General: Skin is warm and dry. Neurological:       General: No focal deficit present.       Mental Status: She is alert and oriented to person, place, and time.  Mental status is at baseline.       Cranial Nerves: No cranial nerve deficit, dysarthria or facial asymmetry.       Sensory: No sensory deficit.       Motor: No weakness, tremor, atrophy, abnormal muscle tone, seizure activity or pronator drift.       Coordination: Coordination normal. Finger-Nose-Finger Test normal.    Exam improved and I do not see any EFRAIN though she has some slow movements in the lateral gaze on the left            MD Consults/Assessments & Plans:   Neurology Assessment/Plan:   Pt seen and examined for neurology consult. Vinicius Glass is a 67-year-old -American female with past medical history of diabetes mellitus type 2, obstructive sleep apnea, tobacco abuse, sepsis, seizures, hypertension, hyperlipidemia, GI bleed, depression, COPD, CHF, chronic back pain, cardiopulmonary arrest, anxiety who presented to [facility] emergency room on 4/6/2022 for diplopia.  Patient had woken up the day prior with double vision.  Patient's daughter had taken her to the eye doctor who diagnosed her with internuclear ophthalmoplegia.  Patient did call Dr. Salina Mathias office on 4/6/2022 who instructed her to come to the emergency room to rule out a stroke.  Patient did not have any other associated focal neuro deficits.  CT the head was in the emergency room yesterday and negative for any acute intracranial process.  She does have a small area of right parietal region encephalomalacia and remote left basal ganglia infarct with tiny bilateral basal ganglia calcifications.  MRI of the brain is pending.  Patient has been initiated on Plavix 75 mg daily and atorvastatin 80 mg nightly   Laboratory testing: Magnesium 2.3,WBC 7.7, hemoglobin 12.2, hematocrit 37.7, platelets 389, sodium 140, potassium 4.5, chloride 105, CO2 21, anion gap 14, glucose 302, BUN 22, creatinine 1.94, calcium 10.0, alk phos 106, ALT 18, AST 24, INR 1, digoxin level 0.8,   Patient is currently alert and oriented x3, no acute distress, cooperative.  She reports that she developed a sudden onset of double vision 2 days prior.  Per documentation above she went to eye doctor who was concerned for EFRAIN.  Patient reports she had difficulty moving her right eye to the left.  Double vision was only in the right eye.  Currently her vision is back to normal.  There is no problems with adduction of the eye noted.  There is no ptosis.  Pupil responses normal.  No other focal neuro deficits.  Blood pressure slightly elevated at times.  Blood sugars elevated in the 200-300 range.  Hemoglobin A1c 12.8.  Low HDL of 33 with LDL of 36.  Last echocardiogram done 3/18/2021 showed ejection fraction of 50% with some suggestion of diastolic dysfunction.  No significant valvular abnormalities.  Severe left ventricular hypertrophy noted. History noted and patient was discussed with emergency room yesterday.  MRI was reviewed and patient has a paramedian pontine stroke though her EFRAIN has improved. Elliott Menchaca is very adamant that she goes home today and would not stay.  Carotid ultrasound shows no significant stenosis. Diplopia of right eye with concern for EFRAIN.  Patient had sudden onset of diplopia to her right eye for which she was examined by the eye doctor with concern for EFRAIN.  Apparently there was difficulty with adduction of the right eye.  This has since resolved and diplopia since resolved. Suann Pillar is no associated ptosis or pupil dilation.  There is noted horizontal nystagmus of the left eye with abduction.  No other focal neuro deficits.  Initial CT the head was negative for any acute findings.  MRI of the brain remains pending. Sarah Calero does have multiple risk factors for CVD including hypertension, hyperlipidemia, diabetes with a hemoglobin A1c of 12.8.  HDL is low at 33 with good LDL of 36.  Patient was not on antiplatelets prior to admission. Meliton Camarillo has been initiated on Plavix 75 mg daily.  She does have history of GI bleed which would make Plavix a better option for her at this time.  Will obtain carotid duplex given her vision changes and concern for CVA. Patient will need ongoing attention to risk factor modification.  Discussed this at length with patient and her family. Waylon Beatty recommendations to follow pending MRI and carotid duplex results. Medications:   Scheduled Meds:   Plavix 75 mg po - given X 1 then d/c'd    apixaban, 2.5 mg, Oral, BID   LORazepam, 0.5 mg, IntraVENous, Once   atorvastatin, 80 mg, Oral, Nightly   insulin lispro, 0-6 Units, SubCUTAneous, TID WC   insulin lispro, 0-3 Units, SubCUTAneous, Nightly   carvedilol, 37.5 mg, Oral, BID WC   PARoxetine, 40 mg, Oral, Daily   insulin lispro, 15 Units, SubCUTAneous, TID WC   amLODIPine, 5 mg, Oral, Daily   insulin glargine, 50 Units, SubCUTAneous, Nightly   digoxin, 125 mcg, Oral, Every Other Day   pantoprazole, 40 mg, Oral, QAM AC   Continuous Infusions: hep-locked   PRN Meds: none given             Orders:   Eliquis   Bilateral carotid U/S             PT/OT/SLP/CM Assessments or Notes:   CM - Initial Discharge Plan? (Note: please see concurrent daily documentation for any updates after initial note).     RETURN HOME W/SPOUSE, DTR. CURRENT WITH [facility]. HAS DME EQUIPMENT AS LISTED ABOVE.  DENIES FURTHER NEEDS.

## 2022-04-07 NOTE — PROGRESS NOTES
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]   Chief Complaint   Patient presents with    Diplopia     since yesterday, R>L, sent by eye doctor     Patient Active Problem List    Diagnosis Date Noted    Lumbosacral radiculopathy at L5 2015    Spinal stenosis of lumbar region with neurogenic claudication 2015    High risk medication use-Norco - 17 OARRS PM&R, 18 OARRS PM&R, 18 OARRS PM&R, Urine Drug screen negative 17 PM&R--MED CONTRACT 2014    Internuclear ophthalmoplegia 2022    Hyperglycemia 2022    Sleep disturbance 2022    Auditory hallucinations 2022    Emotional lability 2022    Other microscopic hematuria 2022    Chronic combined systolic and diastolic CHF (congestive heart failure) ([address] 2022    Chronic kidney disease, stage 3b ([address] 2022    Diabetic renal disease ([address] 2022    Hypervolemia 2022    Chronic kidney disease, stage 4 (severe) ([address] 2022    Yeast infection 2021    Seizures ([address] 2021    Chronic kidney disease due to hypertension 2021    History of GI bleed 2021    Chronic right-sided thoracic back pain 2021    Port-A-Cath in place 2021    Infection due to ESBL-producing Escherichia coli     Palliative care encounter     Bradycardia     Moderate hypoxic-ischemic encephalopathy     Acute respiratory failure with hypoxia (HCC)     Gait abnormality 10/14/2020    Late effects of CVA (cerebrovascular accident) 10/14/2020    Major depressive disorder in remission ([address] 10/06/2020    Gastroesophageal reflux disease without esophagitis 10/06/2020    Acute cystitis with hematuria 10/06/2020    Anemia of chronic renal failure 08/25/2020    Dysarthria     Other fatigue     Anemia     AVM (arteriovenous malformation)     HOCM (hypertrophic obstructive cardiomyopathy) ([address] 11/11/2019    Chronic obstructive pulmonary disease ([address] 10/12/2019    Uncontrolled type 2 diabetes mellitus with hyperglycemia (HCC)     Acute combined systolic and diastolic CHF, NYHA class 1 ([address] 03/24/2019    Myalgia 05/11/2018    Diabetic asymmetric polyneuropathy ([address] 04/11/2017    Impaired mobility and activities of daily living 03/28/2015    Artificial lens present 10/03/2014    Presbyopia 10/03/2014    Astigmatism, regular 10/03/2014    Cataract, nuclear sclerotic senile 10/03/2014    Regular astigmatism 10/03/2014    Nuclear senile cataract 10/03/2014    Memory deficit 06/04/2014    Chronic UTI 06/04/2014    SOB (shortness of breath) on exertion 03/14/2014    CTS (carpal tunnel syndrome) 02/09/2014    DJD (degenerative joint disease), lumbar 02/08/2014    Lumbosacral radiculopathy at S1 02/08/2014    Dysphonia 02/08/2014    Hypertension     Hyperlipidemia     Fibromyalgia     Anxiety     Depression         Past Medical History:   Diagnosis Date    Adenomatous polyp of sigmoid colon     Adenomatous polyp of transverse colon     Anxiety     Asthma     dx 2019 / has smoked since age 11    Cardiopulmonary arrest ([address]     CHF (congestive heart failure) ([address]     Chronic back pain     Bilateral L5 S1 Radic on emg--surprisingly worse on the left than the right--pt's symptoms and her MRI show worse on the right    Chronic obstructive pulmonary disease with acute exacerbation ([address] 10/12/2019    Depression     ESBL E. coli carrier     Carrier.     Fibromyalgia     Gastrointestinal hemorrhage 2/24/2020    Hyperlipidemia     meds > 8 yrs    Hypertension     meds > 45 yrs    Insomnia 12/4/2013    On home O2     2l per n/c at bedtime mostly,     Osteoarthritis     Seizures ([facility]     Sepsis ([facility] 10/6/2020    Smoker 6/18/2013    Type II diabetes mellitus, uncontrolled ([facility]     hx > 8 yrs    Unspecified sleep apnea      Past Surgical History:   Procedure Laterality Date    BACK SURGERY  2017    lumbar disc    CARDIAC CATHETERIZATION  11/03/2014    DR. MIRELES / no stents    COLONOSCOPY  08/29/2016    w/polypectomy     COLONOSCOPY N/A 09/29/2020    COLONOSCOPY WITH POLYPECTOMY performed by Thee Coleman MD at [facility] N/A 10/07/2019    EUA HYSTEROSCOPY DILATATION AND CURETTAGE performed by Nancy Ng DO at [facility]. [address], COLON, DIAGNOSTIC      EYE SURGERY      [place] with IOL OU / [address] [place]  [address]    umbilical hernia repair    IR PORT PLACEMENT EQUAL OR GREATER THAN 5 YEARS  5/14/2021    IR PORT PLACEMENT EQUAL OR GREATER THAN 5 YEARS 5/14/2021 [facility] SPECIAL PROCEDURE    ID ESOPHAGOGASTRODUODENOSCOPY TRANSORAL DIAGNOSTIC N/A 03/24/2017    EGD ESOPHAGOGASTRODUODENOSCOPY performed by Michael Frederick MD at [place] [address] 02/08/2018    negative findings    ID REVISE MEDIAN N/CARPAL TUNNEL SURG Left 06/05/2017    LEFT  CARPAL TUNNEL RELEASE performed by Tara Richardson MD at [facility] HGCV,5+F/M,DTWOF N/A 02/08/2018    TONSILLECTOMY      as child    TUNNELED VENOUS PORT PLACEMENT Right 05/14/2021    8 [employer] Port ClearVUE by Dr. Seth Geller  08/26/2016    w/bx     UPPER GASTROINTESTINAL ENDOSCOPY N/A 02/25/2020    EGD possible biopsy performed by Thee Coleman MD at [facility] 07/01/2020    EGD PUSH ENTEROSCOPY performed by Chuck Vazquez MD at [facility]        Restrictions  Restrictions/Precautions: Fall Risk,Contact Precautions (High per edmond, ESBL urine)     Safety Devices: Safety Devices  Safety Devices in place: Yes  Type of devices:  All fall risk precautions in place        Subjective  Pre Treatment Pain Screening  Pain at present: 0  Scale Used: Numeric Score  Intervention List: Patient able to continue with treatment,Patient declined any intervention    Pain Reassessment:   Pain Assessment  Patient Currently in Pain: Denies  Pain Assessment: 0-10  Pain Level: 0       Prior Level of Function:  Social/Functional History  Lives With: Spouse (Daughter daily)  Type of Home: House  Home Layout: Two level,1/2 bath on main level,Bed/Bath upstairs,Laundry in basement (Stair lift to second floor)  Home Access: Stairs to enter without rails  Entrance Stairs - Number of Steps: 2  Bathroom Shower/Tub: Walk-in shower  Bathroom Equipment: Shower chair,Grab bars in shower,Hand-held shower  Home Equipment: Cane,Electric scooter (rollator)  ADL Assistance: Independent  Homemaking Assistance: Independent  Ambulation Assistance: Independent  Transfer Assistance: Independent  Active : No  Patient's  Info: Spouse or daughter  Occupation: Retired  Additional Comments: Uses mobility scooter in community    OBJECTIVE:     Orientation Status:  Orientation  Overall Orientation Status: Within Functional Limits    Observation:  Observation/Palpation  Posture: Good  Observation: Pt. alert and attentive, agreeable    Cognition Status:  Cognition  Overall Cognitive Status: WFL    Perception Status:  Perception  Overall Perceptual Status: WFL    Sensation Status:  Sensation  Overall Sensation Status: WFL    Vision and Hearing Status:  Vision  Vision: Impaired  Vision Exceptions: Wears glasses at all times (pt reports diplopia resolved pt's report)  Hearing  Hearing: Within functional limits     ROM:   LUE AROM (degrees)  LUE AROM : WFL  Left Hand AROM (degrees)  Left Hand AROM: WFL  RUE AROM (degrees)  RUE AROM : WFL  Right Hand AROM (degrees)  Right Hand AROM: WFL    Strength:  LUE Strength  Gross LUE Strength: WFL  L Hand General: 4/5  LUE independent   Standing Balance: Modified independent     Functional Endurance:  Activity Tolerance  Activity Tolerance: Patient Tolerated treatment well    D/C Recommendations:  OT D/C RECOMMENDATIONS  REQUIRES OT FOLLOW UP: No    Equipment Recommendations:  OT Equipment Recommendations  Equipment Needed: No    OT Education:   OT Education  OT Education: Ed Iron of Care  Patient Education: Educated pt. on role of acute care OT  Barriers to Learning: None    OT Follow Up:  OT D/C RECOMMENDATIONS  REQUIRES OT FOLLOW UP: No     Assessment/Discharge Disposition:  Assessment: Pt is a 67year old woman from home who presents to [facility] with possible CVA. Pt. demonstrates no significant functional deficits which impact her ability to perform ADLs and IADLs. Pt. able to safely perform ADLs. No acute OT needs identified. Prognosis: Good  No Skilled OT: Independent with ADL's,Safe to return home  Decision Making: Low Complexity  History: Pt's medical history is moderately complex  Exam: Pt. has no performance deficits  Assistance / Modification: Pt. requires no physical assist    Six Click Score   How much help for putting on and taking off regular lower body clothing?: None  How much help for Bathing?: None  How much help for Toileting?: None  How much help for putting on and taking off regular upper body clothing?: None  How much help for taking care of personal grooming?: None  How much help for eating meals?: None  AM-PAC Inpatient Daily Activity Raw Score: 24  AM-PAC Inpatient ADL T-Scale Score : 57.54  ADL Inpatient CMS 0-100% Score: 0    Plan:  Plan  Times per week: No acute OT    Goals:     Patient Goal: Patient goals : \"I want to get home\"     Discussed and agreed upon: Yes Comments:     Therapy Time:   OT Individual Minutes  Time In: 1335  Time Out: 1345  Minutes: 10    Eval: 10 minutes     Electronically signed by:     JABIER Gupta/L, OTYESY/L  4/7/2022, 2:00 PM

## 2022-04-07 NOTE — PROGRESS NOTES
CLINICAL PHARMACY NOTE: MEDS TO BEDS    Total # of Prescriptions Filled: 1   The following medications were delivered to the patient:  · Eliquis 2.5mg tab    Additional Documentation:

## 2022-04-08 ENCOUNTER — TELEPHONE (OUTPATIENT)
Dept: FAMILY MEDICINE CLINIC | Age: 77
End: 2022-04-08

## 2022-04-08 NOTE — TELEPHONE ENCOUNTER
[facility] Transitions Initial Follow Up Call    Outreach made within 2 business days of discharge: Yes    Patient: Bill Danielson Patient : 1945   MRN: [id number]  Reason for Admission: There are no discharge diagnoses documented for the most recent discharge.   Discharge Date: 22       Spoke with: Georges Simpson    Discharge department/facility: 2W      Scheduled appointment with PCP within 7-14 days    Follow Up  Future Appointments   Date Time Provider Zaid Lr   2022 12:00 PM Mar Madrid PA-C [address] [place] [address]   2022 11:30 AM Kristan Garcia MD [facility]   2022  1:00 PM Dwayne Almonte APRN - CNP [facility]   2022  1:30 PM Bryan Koroma MD [facility]   2022  3:00 PM Emanuel English MD [facility] [address], [place]

## 2022-04-11 NOTE — ANESTHESIA PRE PROCEDURE
Department of Anesthesiology  Preprocedure Note       Name:  Brijesh Burger   Age:  75 y.o.  :  1945                                          MRN:  [id number]         Date:  2020      Surgeon: Brenda Herrera):  Lars Landeros MD    Procedure: Procedure(s):  EGD PUSH ENTEROSCOPY    Medications prior to admission:   Prior to Admission medications    Medication Sig Start Date End Date Taking? Authorizing Provider   insulin 70-30 (NOVOLIN 70/30) (70-30) 100 UNIT per ML injection vial 30-40  units twice a day if glucose more than 120 20  Yes Lyle Scott MD   ACCU-CHEK PHYLLIS PLUS strip Test 3x daily Dx E11.65 20  Yes Cindy Hackett MD   Accu-Chek Softclix Lancets MISC bid 20  Yes Cindy Hackett MD   PARoxetine (PAXIL) 40 MG tablet TAKE 1 TABLET BY MOUTH ONCE DAILY IN THE MORNING 20  Yes Kt Burdick MD   carvedilol (COREG) 25 MG tablet Take 1 tablet by mouth 2 times daily 20  Yes Lyndsey Candelario MD   digoxin (LANOXIN) 125 MCG tablet Take 1 tablet by mouth daily 20  Yes Lyndsey Candelario MD   hydrALAZINE (APRESOLINE) 100 MG tablet Take 100 mg by mouth 2 times daily   Yes Historical MD Conrad   pantoprazole (PROTONIX) 40 MG tablet Take 40mg twice daily (1st dose 30min before breakfast) for 30 days.  After 30 days, you make take 40mg once daily before breakfast. 20  Yes Moises Salomon DO   furosemide (LASIX) 40 MG tablet Take 1 tablet by mouth daily 20  Yes Kt Burdick MD   rosuvastatin (CRESTOR) 40 MG tablet Take 1 tablet by mouth every evening 20  Yes Kt Burdick MD   dicyclomine (BENTYL) 10 MG capsule Take 1 capsule by mouth 4 times daily as needed (abdominal pain) 19  Yes JOHNATHON Blackburn CNP   ondansetron (ZOFRAN) 4 MG tablet Take 1 tablet by mouth every 8 hours as needed for Nausea or Vomiting 19  Yes JOHNATHON Blackburn CNP   Insulin Syringe-Needle U-100 (INSULIN SYRINGE 1CC/31G\") 31G X \" 1 ML MISC 1 each by Does DIANE Lombardo        0.9 % sodium chloride bolus  20 mL Intravenous Once [place], [place]   Stopped at 06/29/20 1200    albuterol-ipratropium (COMBIVENT RESPIMAT)  MCG/ACT inhaler 1 puff  1 puff Inhalation TID Peyman Rai DO   1 puff at 07/01/20 0749    nicotine (NICODERM CQ) 14 MG/24HR 1 patch  1 patch Transdermal Daily Peyman Rai DO   1 patch at 06/30/20 1000    digoxin (LANOXIN) tablet 125 mcg  125 mcg Oral Daily Peyman Rai, DO   125 mcg at 06/30/20 1713    PARoxetine (PAXIL) tablet 40 mg  40 mg Oral Daily Peyman Rai, DO   40 mg at 06/30/20 1713    rosuvastatin (CRESTOR) tablet 40 mg  40 mg Oral QPM Peyman Rai, DO   40 mg at 06/30/20 2057    glucose (GLUTOSE) 40 % oral gel 15 g  15 g Oral PRN Peyman Rai, DO        dextrose 50 % IV solution  12.5 g Intravenous PRN Peyman Rai, DO        glucagon (rDNA) injection 1 mg  1 mg Intramuscular PRN Peyman Rai, DO        dextrose 5 % solution  100 mL/hr Intravenous PRN Peyman Rai, DO        hydrALAZINE (APRESOLINE) tablet 100 mg  100 mg Oral BID Peyman Rai, DO   100 mg at 06/30/20 1713    pantoprazole (PROTONIX) injection 40 mg  40 mg Intravenous BID Peyman Rai, DO   40 mg at 06/30/20 2057    And    sodium chloride (PF) 0.9 % injection 10 mL  10 mL Intravenous Daily Peyman Rai DO   10 mL at 06/30/20 0956    furosemide (LASIX) injection 40 mg  40 mg Intravenous BID Lila Mata MD   40 mg at 06/30/20 1832       Allergies:     Allergies   Allergen Reactions    Ibuprofen Nausea Only    Metformin And Related      Diarrhea      Darvon [Propoxyphene Hcl] Nausea And Vomiting       Problem List:    Patient Active Problem List   Diagnosis Code    Hypertension I10    Hyperlipidemia E78.5    Uncontrolled type 2 diabetes mellitus with complication, without long-term current use of insulin ([facility]) E11.8, E11.65    Fibromyalgia M79.7    Anxiety F41.9    Smoker F17.200    Insomnia G47.00    DJD Gastrointestinal hemorrhage K92.2    COPD exacerbation (HCC) J44.1    Anemia D64.9    AVM (arteriovenous malformation) Q27.30       Past Medical History:        Diagnosis Date    Anxiety     Asthma     dx 2019 / has smoked since age 14    CHF (congestive heart failure) (HCC)     Chronic back pain     Bilateral L5 S1 Radic on emg--surprisingly worse on the left than the right--pt's symptoms and her MRI show worse on the right    Chronic obstructive pulmonary disease with acute exacerbation ([facility] 10/12/2019    Depression     Fibromyalgia     Hyperlipidemia     meds > 8 yrs    Hypertension     meds > 45 yrs    Osteoarthritis     Type II diabetes mellitus, uncontrolled ([facility]     hx > 8 yrs    Unspecified sleep apnea        Past Surgical History:        Procedure Laterality Date    BACK SURGERY  2017    lumbar disc    CARDIAC CATHETERIZATION  11/3/14     DR. MIRELES / no stents    COLONOSCOPY  08/29/2016    w/polypectomy     DILATION AND CURETTAGE OF UTERUS N/A 10/7/2019    EUA HYSTEROSCOPY DILATATION AND CURETTAGE performed by William Santillan DO at [address] [place] [address] ENDOSCOPY, COLON, DIAGNOSTIC      EYE SURGERY      [place] with IOL OU / [address] [place]  [address]    umbilical hernia repair    AR ESOPHAGOGASTRODUODENOSCOPY TRANSORAL DIAGNOSTIC N/A 3/24/2017    EGD ESOPHAGOGASTRODUODENOSCOPY performed by Kingston Mcpherson MD at [facility] N/A 2/8/2018    negative findings    AR REVISE MEDIAN N/CARPAL TUNNEL SURG Left 6/5/2017    LEFT  CARPAL TUNNEL RELEASE performed by Titus Richard MD at [facility] XLEQ,7+Z/Z,GPWUM N/A 2/8/2018    TONSILLECTOMY      as child    UPPER GASTROINTESTINAL ENDOSCOPY  08/26/2016    w/bx     UPPER GASTROINTESTINAL ENDOSCOPY N/A 2/25/2020    EGD possible biopsy performed by Davi Altman MD at [facility] [address] History:    Social History     Tobacco Use    Smoking status: Former Smoker Packs/day: 1.00     Years: 59.00     Pack years: 59.00     Types: Cigarettes     Start date: 11/30/2017    Smokeless tobacco: Never Used    Tobacco comment: quit 2-3 weeks ago    Substance Use Topics    Alcohol use: Not Currently                                Counseling given: Not Answered  Comment: quit 2-3 weeks ago       Vital Signs (Current):   Vitals:    06/30/20 1700 06/30/20 1918 06/30/20 2035 07/01/20 0804   BP: 133/60 (!) 147/44  (!) 122/48   Pulse: 91 103  76   Resp:  17 20   Temp:  36.3 °C (97.3 °F)  36.6 °C (97.9 °F)   TempSrc:  Oral  Oral   SpO2:  100% 100% 100%   Weight:       Height:                                                  BP Readings from Last 3 Encounters:   07/01/20 (!) 122/48   03/20/20 (!) 150/80   03/02/20 138/60       NPO Status:                                                                                 BMI:   Wt Readings from Last 3 Encounters:   06/30/20 125 lb 8 oz (56.9 kg)   03/20/20 138 lb 14.4 oz (63 kg)   03/02/20 133 lb (60.3 kg)     Body mass index is 25.35 kg/m². CBC:   Lab Results   Component Value Date    WBC 12.1 07/01/2020    RBC 3.73 07/01/2020    HGB 8.0 07/01/2020    HCT 26.4 07/01/2020    MCV 70.7 07/01/2020    RDW 26.5 07/01/2020     07/01/2020       CMP:   Lab Results   Component Value Date     07/01/2020    K 4.4 07/01/2020     07/01/2020    CO2 18 07/01/2020    BUN 61 07/01/2020    CREATININE 1.92 07/01/2020    GFRAA 30.7 07/01/2020    LABGLOM 25.4 07/01/2020    GLUCOSE 422 07/01/2020    PROT 6.7 06/29/2020    CALCIUM 9.1 07/01/2020    BILITOT 0.3 06/29/2020    ALKPHOS 105 06/29/2020    AST 44 06/29/2020    ALT 22 06/29/2020       POC Tests:   Recent Labs     06/29/20  0832  07/01/20  0621   POCGLU 134*   < > 397*   POCNA 141  --   --    POCK 4.3  --   --    POCCL 112*  --   --    POCHCT 18*  --   --     < > = values in this interval not displayed.        Coags:   Lab Results   Component Value Date    PROTIME 13.6 06/29/2020 INR 1.0 06/29/2020    APTT <20.0 06/29/2020       HCG (If Applicable): No results found for: PREGTESTUR, PREGSERUM, HCG, HCGQUANT     ABGs:   Lab Results   Component Value Date    PHART 7.401 06/29/2020    PO2ART 259 06/29/2020    NKE6IQJ 35 06/29/2020    DJU8SIU 21.8 06/29/2020    BEART -3 06/29/2020    J6YEUIKI 100 06/29/2020        Type & Screen (If Applicable):  No results found for: LABABO, LABRH    Drug/Infectious Status (If Applicable):  No results found for: HIV, HEPCAB    COVID-19 Screening (If Applicable):   Lab Results   Component Value Date    COVID19 Not Detected 06/30/2020         Anesthesia Evaluation    Airway: Mallampati: II  TM distance: >3 FB   Neck ROM: full  Mouth opening: > = 3 FB Dental:          Pulmonary:normal exam    (+) COPD:  shortness of breath:  sleep apnea:  asthma:                            Cardiovascular:    (+) hypertension:, CHF:, MENDEZ:, hyperlipidemia        Rhythm: regular  Rate: normal                    Neuro/Psych:   (+) neuromuscular disease:, psychiatric history:depression/anxiety             GI/Hepatic/Renal: Neg GI/Hepatic/Renal ROS            Endo/Other:    (+) Diabetes, blood dyscrasia: anemia:., .                 Abdominal:           Vascular: negative vascular ROS. Anesthesia Plan      MAC     ASA 3       Induction: intravenous. Anesthetic plan and risks discussed with patient. Plan discussed with attending.                   Dawn Kolb, APRN - CRNA   7/1/2020 Albendazole Counseling:  I discussed with the patient the risks of albendazole including but not limited to cytopenia, kidney damage, nausea/vomiting and severe allergy.  The patient understands that this medication is being used in an off-label manner.

## 2022-04-12 ENCOUNTER — OFFICE VISIT (OUTPATIENT)
Dept: FAMILY MEDICINE CLINIC | Age: 77
End: 2022-04-12
Payer: MEDICARE

## 2022-04-12 VITALS
TEMPERATURE: 98.8 F | OXYGEN SATURATION: 98 % | RESPIRATION RATE: 14 BRPM | HEART RATE: 75 BPM | SYSTOLIC BLOOD PRESSURE: 108 MMHG | WEIGHT: 150 LBS | BODY MASS INDEX: 30.24 KG/M2 | DIASTOLIC BLOOD PRESSURE: 60 MMHG | HEIGHT: 59 IN

## 2022-04-12 DIAGNOSIS — E11.59 TYPE 2 DIABETES MELLITUS WITH OTHER CIRCULATORY COMPLICATION, WITH LONG-TERM CURRENT USE OF INSULIN (HCC): ICD-10-CM

## 2022-04-12 DIAGNOSIS — I65.23 BILATERAL CAROTID ARTERY STENOSIS: ICD-10-CM

## 2022-04-12 DIAGNOSIS — I25.10 CORONARY ARTERY DISEASE DUE TO LIPID RICH PLAQUE: ICD-10-CM

## 2022-04-12 DIAGNOSIS — I25.83 CORONARY ARTERY DISEASE DUE TO LIPID RICH PLAQUE: ICD-10-CM

## 2022-04-12 DIAGNOSIS — G40.909 SEIZURE DISORDER (HCC): ICD-10-CM

## 2022-04-12 DIAGNOSIS — Z79.4 TYPE 2 DIABETES MELLITUS WITH OTHER CIRCULATORY COMPLICATION, WITH LONG-TERM CURRENT USE OF INSULIN (HCC): ICD-10-CM

## 2022-04-12 DIAGNOSIS — I63.9 CEREBROVASCULAR ACCIDENT (CVA), UNSPECIFIED MECHANISM (HCC): Primary | ICD-10-CM

## 2022-04-12 PROCEDURE — 1036F TOBACCO NON-USER: CPT | Performed by: PHYSICIAN ASSISTANT

## 2022-04-12 PROCEDURE — 4040F PNEUMOC VAC/ADMIN/RCVD: CPT | Performed by: PHYSICIAN ASSISTANT

## 2022-04-12 PROCEDURE — 1111F DSCHRG MED/CURRENT MED MERGE: CPT | Performed by: PHYSICIAN ASSISTANT

## 2022-04-12 PROCEDURE — 1123F ACP DISCUSS/DSCN MKR DOCD: CPT | Performed by: PHYSICIAN ASSISTANT

## 2022-04-12 PROCEDURE — G8427 DOCREV CUR MEDS BY ELIG CLIN: HCPCS | Performed by: PHYSICIAN ASSISTANT

## 2022-04-12 PROCEDURE — 1090F PRES/ABSN URINE INCON ASSESS: CPT | Performed by: PHYSICIAN ASSISTANT

## 2022-04-12 PROCEDURE — G8400 PT W/DXA NO RESULTS DOC: HCPCS | Performed by: PHYSICIAN ASSISTANT

## 2022-04-12 PROCEDURE — 99215 OFFICE O/P EST HI 40 MIN: CPT | Performed by: PHYSICIAN ASSISTANT

## 2022-04-12 PROCEDURE — G8417 CALC BMI ABV UP PARAM F/U: HCPCS | Performed by: PHYSICIAN ASSISTANT

## 2022-04-12 PROCEDURE — 3046F HEMOGLOBIN A1C LEVEL >9.0%: CPT | Performed by: PHYSICIAN ASSISTANT

## 2022-04-21 DIAGNOSIS — Z79.4 TYPE 2 DIABETES MELLITUS WITH OTHER SPECIFIED COMPLICATION, WITH LONG-TERM CURRENT USE OF INSULIN (HCC): ICD-10-CM

## 2022-04-21 DIAGNOSIS — E11.69 TYPE 2 DIABETES MELLITUS WITH OTHER SPECIFIED COMPLICATION, WITH LONG-TERM CURRENT USE OF INSULIN (HCC): ICD-10-CM

## 2022-04-25 RX ORDER — INSULIN GLARGINE 100 [IU]/ML
INJECTION, SOLUTION SUBCUTANEOUS
Qty: 30 ML | Refills: 3 | Status: SHIPPED | OUTPATIENT
Start: 2022-04-25 | End: 2022-06-06 | Stop reason: SDUPTHER

## 2022-04-25 NOTE — TELEPHONE ENCOUNTER
Pharmacy requesting medication refill.  Please approve or deny this request.    Rx requested:  Requested Prescriptions     Pending Prescriptions Disp Refills    insulin glargine (LANTUS) 100 UNIT/ML injection vial [Pharmacy Med Name: Lantus 100 UNIT/ML Subcutaneous Solution] 30 mL 0     Sig: INJECT 60 UNITS SUBCUTANEOUSLY AT BEDTIME         Last Office Visit:   3/17/2021      Next Visit Date:  Future Appointments   Date Time Provider Zaid Lr   4/26/2022  1:00 PM [facility]FUSION CHAIR [address] [facility]   4/28/2022  1:15 PM Ho Guzman MD [address] [place] [address]   5/6/2022 11:30 AM Ronak Johnson MD [facility]   5/17/2022  1:00 PM JOHNATHON Cook - [facility]   5/23/2022  1:15 PM Jaron Chaudhari MD [facility]   6/9/2022  1:30 PM Aquilino Miller MD [facility]   6/27/2022  3:00 PM Mehul Taylor MD [facility]   7/13/2022 11:00 AM Aquilino Miller MD [address] [facility]

## 2022-04-25 NOTE — TELEPHONE ENCOUNTER
requesting medication refill.  Please approve or deny this request.    Rx requested:  Requested Prescriptions     Pending Prescriptions Disp Refills    apixaban (ELIQUIS) 2.5 MG TABS tablet 60 tablet 3     Sig: Take 1 tablet by mouth 2 times daily         Last Office Visit:   3/7/2022      Next Visit Date:  Future Appointments   Date Time Provider Zaid Lr   4/26/2022  1:00 PM Orquideasusan Flippin INFUSION CHAIR 7 [address] [facility]   4/28/2022  1:15 PM Belén Hazel MD [facility]   5/6/2022 11:30 AM Ching Diaz MD [facility]   5/17/2022  1:00 PM JOHNATHON [place] - [facility]   5/23/2022  1:15 PM Isaac Evangelista MD [facility]   6/9/2022  1:30 PM Tee Govea MD [facility]   6/27/2022  3:00 PM Mehul Law MD [facility]   7/13/2022 11:00 AM Tee Govea MD [address] [facility]

## 2022-04-25 NOTE — TELEPHONE ENCOUNTER
Rx requested:  Requested Prescriptions     Pending Prescriptions Disp Refills    apixaban (ELIQUIS) 2.5 MG TABS tablet 60 tablet 0     Sig: Take 1 tablet by mouth 2 times daily         Last Office Visit:   3/7/2022      Next Visit Date:  Future Appointments   Date Time Provider Zaid Lr   4/26/2022  1:00 PM [address]    4/28/2022  1:15 PM Ho Guzman MD [facility]   5/6/2022 11:30 AM Ronak Johnson MD [facility]   5/17/2022  1:00 PM JOHNATHON Cook - [facility]   5/23/2022  1:15 PM Jaron Chaudhari MD [facility]   6/9/2022  1:30 PM Aquilion Miller MD [facility]   6/27/2022  3:00 PM Mehul Taylor MD [facility]   7/13/2022 11:00 AM Aquilino Miller MD [address] [facility]

## 2022-04-26 ENCOUNTER — HOSPITAL ENCOUNTER (OUTPATIENT)
Dept: INFUSION THERAPY | Age: 77
Setting detail: INFUSION SERIES
Discharge: HOME | End: 2022-04-26
Payer: MEDICARE

## 2022-04-26 VITALS
SYSTOLIC BLOOD PRESSURE: 125 MMHG | DIASTOLIC BLOOD PRESSURE: 62 MMHG | HEART RATE: 80 BPM | TEMPERATURE: 98.6 F | RESPIRATION RATE: 18 BRPM

## 2022-04-26 DIAGNOSIS — Z95.828 PORT-A-CATH IN PLACE: Primary | ICD-10-CM

## 2022-04-26 PROCEDURE — 96523 IRRIG DRUG DELIVERY DEVICE: CPT

## 2022-04-26 PROCEDURE — 2580000003 HC RX 258: Performed by: NURSE PRACTITIONER

## 2022-04-26 PROCEDURE — 6360000002 HC RX W HCPCS: Performed by: NURSE PRACTITIONER

## 2022-04-26 RX ORDER — HEPARIN SODIUM (PORCINE) LOCK FLUSH IV SOLN 100 UNIT/ML 100 UNIT/ML
500 SOLUTION INTRAVENOUS PRN
Status: DISCONTINUED | OUTPATIENT
Start: 2022-04-26 | End: 2022-04-27 | Stop reason: HOSPADM

## 2022-04-26 RX ORDER — SODIUM CHLORIDE 0.9 % (FLUSH) 0.9 %
5-40 SYRINGE (ML) INJECTION PRN
Status: DISCONTINUED | OUTPATIENT
Start: 2022-04-26 | End: 2022-04-27 | Stop reason: HOSPADM

## 2022-04-26 RX ORDER — HEPARIN SODIUM (PORCINE) LOCK FLUSH IV SOLN 100 UNIT/ML 100 UNIT/ML
500 SOLUTION INTRAVENOUS PRN
Status: CANCELLED | OUTPATIENT
Start: 2022-05-17

## 2022-04-26 RX ORDER — SODIUM CHLORIDE 0.9 % (FLUSH) 0.9 %
5-40 SYRINGE (ML) INJECTION PRN
Status: CANCELLED | OUTPATIENT
Start: 2022-05-17

## 2022-04-26 RX ADMIN — SODIUM CHLORIDE, PRESERVATIVE FREE 20 ML: 5 INJECTION INTRAVENOUS at 13:26

## 2022-04-26 RX ADMIN — HEPARIN 500 UNITS: 100 SYRINGE at 13:26

## 2022-04-26 NOTE — FLOWSHEET NOTE
Port flush is complete. Tolerated well. Left the unit via wheelchair with her . All equipment used in the care for this patient has been cleaned.   \

## 2022-04-28 ENCOUNTER — OFFICE VISIT (OUTPATIENT)
Dept: CARDIOLOGY CLINIC | Age: 77
End: 2022-04-28
Payer: MEDICARE

## 2022-04-28 VITALS
OXYGEN SATURATION: 98 % | WEIGHT: 150 LBS | HEIGHT: 59 IN | SYSTOLIC BLOOD PRESSURE: 122 MMHG | RESPIRATION RATE: 16 BRPM | DIASTOLIC BLOOD PRESSURE: 64 MMHG | BODY MASS INDEX: 30.24 KG/M2 | HEART RATE: 76 BPM

## 2022-04-28 DIAGNOSIS — I95.2 HYPOTENSION DUE TO DRUGS: ICD-10-CM

## 2022-04-28 DIAGNOSIS — K92.2 GASTROINTESTINAL HEMORRHAGE, UNSPECIFIED GASTROINTESTINAL HEMORRHAGE TYPE: ICD-10-CM

## 2022-04-28 DIAGNOSIS — I42.1 HOCM (HYPERTROPHIC OBSTRUCTIVE CARDIOMYOPATHY) (HCC): ICD-10-CM

## 2022-04-28 DIAGNOSIS — I16.0 HYPERTENSIVE URGENCY: ICD-10-CM

## 2022-04-28 DIAGNOSIS — I50.41 ACUTE COMBINED SYSTOLIC AND DIASTOLIC CHF, NYHA CLASS 1 (HCC): Primary | ICD-10-CM

## 2022-04-28 DIAGNOSIS — N18.4 STAGE 4 CHRONIC KIDNEY DISEASE (HCC): ICD-10-CM

## 2022-04-28 DIAGNOSIS — I25.10 CORONARY ARTERY DISEASE INVOLVING NATIVE CORONARY ARTERY OF NATIVE HEART WITHOUT ANGINA PECTORIS: ICD-10-CM

## 2022-04-28 DIAGNOSIS — I10 ESSENTIAL HYPERTENSION: ICD-10-CM

## 2022-04-28 DIAGNOSIS — E78.2 MIXED HYPERLIPIDEMIA: ICD-10-CM

## 2022-04-28 PROCEDURE — 99214 OFFICE O/P EST MOD 30 MIN: CPT | Performed by: INTERNAL MEDICINE

## 2022-04-28 PROCEDURE — 1123F ACP DISCUSS/DSCN MKR DOCD: CPT | Performed by: INTERNAL MEDICINE

## 2022-04-28 PROCEDURE — 4040F PNEUMOC VAC/ADMIN/RCVD: CPT | Performed by: INTERNAL MEDICINE

## 2022-04-28 PROCEDURE — 1090F PRES/ABSN URINE INCON ASSESS: CPT | Performed by: INTERNAL MEDICINE

## 2022-04-28 PROCEDURE — 1036F TOBACCO NON-USER: CPT | Performed by: INTERNAL MEDICINE

## 2022-04-28 PROCEDURE — G8417 CALC BMI ABV UP PARAM F/U: HCPCS | Performed by: INTERNAL MEDICINE

## 2022-04-28 PROCEDURE — G8427 DOCREV CUR MEDS BY ELIG CLIN: HCPCS | Performed by: INTERNAL MEDICINE

## 2022-04-28 PROCEDURE — G8400 PT W/DXA NO RESULTS DOC: HCPCS | Performed by: INTERNAL MEDICINE

## 2022-04-28 PROCEDURE — 1111F DSCHRG MED/CURRENT MED MERGE: CPT | Performed by: INTERNAL MEDICINE

## 2022-04-28 ASSESSMENT — ENCOUNTER SYMPTOMS
BLOOD IN STOOL: 0
CHEST TIGHTNESS: 0
SHORTNESS OF BREATH: 0
STRIDOR: 0
NAUSEA: 0
WHEEZING: 0
GASTROINTESTINAL NEGATIVE: 1
RESPIRATORY NEGATIVE: 1
EYES NEGATIVE: 1
COUGH: 0

## 2022-04-28 NOTE — PROGRESS NOTES
Subsequent Progress Note  Patient: Sreedhar Whitaker  YOB: 1945  MRN: [id number]    Chief Complaint: htn HF SOB DM HOCM  Chief Complaint   Patient presents with    Follow-Up from [facility] 4/6-4/7/2    Cerebrovascular Accident    Congestive Heart Failure       CV Data:  3/2019 Echo EF 79% no KYLE   10/8/2019 Cath CX 20-30 LVEF 95%   10/2020 Echo EF 55 Moderate CLVH  10/5/2020 Cath LAD 50-60   3/21 Echo EF 50 Severe + MR RVSP 31   2/05 LICA >67  Peak Velocity  233 m/s WU 50-69    Subjective/HPI: she stopped Verapamil 3 days ago due to severe fatigue and weakness. Feels better now. No cp no sob no bleed. C/o nocturnal leg cramps    3/27/2020 Patient and/or health care decision maker is aware that that he may receive a bill for this telephone service, depending on his insurance coverage, and has provided verbal consent to proceed. This visit was completed via telephone. Time spent on the phone with patient 18 minutes. She was to f/u with Dr. Jose Braden but was on my VV schedule today. No CP no SOB. Still has occasional night time leg cramps. She is walking and compliant with meds. No LE edema. 1/15/21 recent multiple hospitalization. She was intubated and in shock. Had TVP for Bradycardia but stabilized and did not require PPM.      3/11/21 TELEHEALTH EVALUATION -- Audio/Visual (During [id number] public health emergency)    No cp no sob no falls no bleed. Not walking much. Poor appetite.  /104. .. ran out of Verapamil and Clonidine. 3/18/21 Patient and/or health care decision maker is aware that that he/she may receive a bill for this telephone service, depending on his insurance coverage, and has provided verbal consent to proceed. This visit was completed via telephone. Total time 14 minutes. Had been doing well. This AM she took extra full dose Torsemde 50 mg because she thought she was not uriniating enough.    called in due to her BP being 79/56.  She is awake and alert sitting. No cp no sob. Just feels weak and tired     4/16/21 TELEHEALTH EVALUATION -- Audio/Visual (During [id number] public health emergency)    Recent resp failure intubated. No cp no sob no falls no bleed Bp stable. Her voice is hoarse from ET tube and swelling. She has no dysphagia    6/1/21 feels better. Recent hosp had intestinal bleed and taken off ASA>  She will have denat extraction o 6/26. She will stay off ASA for now. 8/6/21 legs weak. Walk little with lobo. No falls no bleed. Eeats well. Sleeps too much. Always Early AM BP elevated. 11/5/21 not very active having sob no falls no bleed. occ CP at night. occ dizzy    4/28/22 doing well no cp no sob no falls. No further bleed. Recent pontine cva. plavix was stopped and low dose Eliquis started. Pt cries often now since CVA. EKG: SR67    Past Medical History:   Diagnosis Date    Adenomatous polyp of sigmoid colon     Adenomatous polyp of transverse colon     Anxiety     Asthma     dx 2019 / has smoked since age 11    Brainstem stroke ([address]     Cardiopulmonary arrest ([address]     CHF (congestive heart failure) (HCC)     Chronic back pain     Bilateral L5 S1 Radic on emg--surprisingly worse on the left than the right--pt's symptoms and her MRI show worse on the right    Chronic obstructive pulmonary disease with acute exacerbation ([address] 10/12/2019    Depression     ESBL E. coli carrier     Carrier.     Fibromyalgia     Gastrointestinal hemorrhage 2/24/2020    Hyperlipidemia     meds > 8 yrs    Hypertension     meds > 45 yrs    Insomnia 12/4/2013    On home O2     2l per n/c at bedtime mostly,     Osteoarthritis     Seizures ([address]     Sepsis ([address] 10/6/2020    Smoker 6/18/2013    Type II diabetes mellitus, uncontrolled ([address]     hx > 8 yrs    Unspecified sleep apnea        Past Surgical History:   Procedure Laterality Date    BACK SURGERY  2017    lumbar disc    CARDIAC CATHETERIZATION 2014    DR. MIRELES / no stents    COLONOSCOPY  2016    w/polypectomy     COLONOSCOPY N/A 2020    COLONOSCOPY WITH POLYPECTOMY performed by Angela Jim[facility]nez MD at [facility] N/A 10/07/2019    EUA HYSTEROSCOPY DILATATION AND CURETTAGE performed by Clarence Hackett DO at  [address], COLON, DIAGNOSTIC      EYE SURGERY      [place] with IOL OU / [address] [place]  [id number]    umbilical hernia repair    IR PORT PLACEMENT EQUAL OR GREATER THAN 5 YEARS  2021    IR PORT PLACEMENT EQUAL OR GREATER THAN 5 YEARS 2021 MLOZ SPECIAL PROCEDURE    IL ESOPHAGOGASTRODUODENOSCOPY TRANSORAL DIAGNOSTIC N/A 2017    EGD ESOPHAGOGASTRODUODENOSCOPY performed by Lia Ramsey MD at [facility] N/A 2018    negative findings    IL REVISE MEDIAN N/CARPAL TUNNEL SURG Left 2017    LEFT  CARPAL TUNNEL RELEASE performed by Josef Floyd [facility] at [facility],2+V/Z,JVMXO N/A 2018    TONSILLECTOMY      as child    TUNNELED VENOUS PORT PLACEMENT Right 2021    [employer] Port ClearVUE by Dr. Carreon Milla  2016    w/bx     UPPER GASTROINTESTINAL ENDOSCOPY N/A 2020    EGD possible biopsy performed by Angela Jiménez MD at [address] N/A 2020    EGD PUSH ENTEROSCOPY performed by Isma Briggs MD at [facility]       Family History   Problem Relation Age of Onset    Heart Disease Father         cardiac bypass    Arthritis Father     Arthritis Mother     Other Mother          at age 80    Other Sister         [facility]    No Known Problems Daughter     Stroke Son        Social History     Socioeconomic History    Marital status:      Spouse name: Dillon Junior Number of children: 2    Years of education: 12    Highest education level: High school graduate   Occupational History    Occupation: Retired-   Tobacco Use    Smoking status: Former Smoker     Packs/day: 1.00     Years: 59.00     Pack years: 59.00     Types: Cigarettes     Start date: 2017     Quit date: 10/1/2020     Years since quittin.5    Smokeless tobacco: Never Used   Vaping Use    Vaping Use: Never used   Substance and Sexual Activity    Alcohol use: Not Currently    Drug use: No    Sexual activity: Yes     Partners: Male   Other Topics Concern    None   Social History Narrative    Grew up in [place]     Lives With:  [id number] Spouse    Type of Home: House    Home Layout: Two level, Performs ADL's on one level    Home Access: Stairs to enter with rails    Entrance Stairs - Number of Steps: 4    Bathroom Shower/Tub: Tub/Shower unit, Doors    Bathroom Equipment: Shower chair, Grab bars in [facility]: Rolling walker, Cane, Oxygen(uses her O2 very seldom)    ADL Assistance: Needs assistance    Homemaking Assistance: Needs assistance    Homemaking Responsibilities: No(spouse performs)    Ambulation Assistance: Independent    Transfer Assistance: Independent    Active : No    Occupation: Retired    Type of occupation: worked in [employer]: patient enjoys [address] [facility] Strain: Low Risk     Difficulty of Paying Living Expenses: Not hard at [facility] Insecurity: No Food Insecurity    Worried About [address] [facility] in the Last Year: Never true    [address] [facility] [address] in the Last Year: Never true   Transportation Needs:     Lack of Transportation (Medical): Not on file    Lack of Transportation (Non-Medical):  Not on file   Physical Activity:     Days of Exercise per Week: Not on file    Minutes of Exercise per Session: Not on file   Stress:     Feeling of Stress : Not on file   Social Connections:     Frequency of Communication with Friends and Family: Not on file    Frequency of Social Gatherings with Friends and Family: Not on file    Attends Church Services: Not on file    Active Member of Clubs or Organizations: Not on file    Attends Club or Organization Meetings: Not on file    Marital Status: Not on file   Intimate Partner Violence:     Fear of Current or Ex-Partner: Not on file    Emotionally Abused: Not on file    Physically Abused: Not on file    Sexually Abused: Not on file   Housing Stability:     Unable to Pay for Housing in the Last Year: Not on file    Number of [place] in the Last Year: Not on file    Unstable Housing in the Last Year: Not on file       Allergies   Allergen Reactions    Ibuprofen Nausea Only    Metformin And Related      Diarrhea      Darvon [Propoxyphene Hcl] Nausea And Vomiting       Current Outpatient Medications   Medication Sig Dispense Refill    insulin glargine (LANTUS) 100 UNIT/ML injection vial INJECT 65 UNITS SUBCUTANEOUSLY AT BEDTIME.  E11.69 30 mL 3    apixaban (ELIQUIS) 2.5 MG TABS tablet Take 1 tablet by mouth 2 times daily 60 tablet 0    carvedilol (COREG) 25 MG tablet TAKE 1 & 1/2 (ONE & ONE-HALF) TABLETS BY MOUTH TWICE DAILY WITH MEALS 270 tablet 0    PARoxetine (PAXIL) 40 MG tablet TAKE 1 TABLET BY MOUTH ONCE DAILY IN THE MORNING (Patient taking differently: at bedtime TAKE 1 TABLET BY MOUTH ONCE DAILY IN THE EVENING) 90 tablet 1    insulin lispro (HUMALOG) 100 UNIT/ML injection vial INJECT 15 UNITS SUBCUTANEOUSLY WITH EACH MEAL (Patient taking differently: INJECT 26 UNITS SUBCUTANEOUSLY WITH EACH MEAL) 10 mL 3    ACCU-CHEK PHYLLIS PLUS strip Test 3x daily Dx E11.65 100 each 3    nitrofurantoin (MACRODANTIN) 50 MG capsule Take 1 capsule by mouth nightly 90 capsule 3    amLODIPine (NORVASC) 5 MG tablet Take 1 tablet by mouth daily 90 tablet 3    glucose 4 g chewable tablet Take 4 tablets by mouth as needed for Low blood sugar 60 tablet 3    Glucagon (GVOKE HYPOPEN 2-PACK) 1 MG/0.2ML SOAJ Inject 1 mg into the skin every 15 minutes as needed (glucose less tahtn 70 or if symptomatic) 2 each 3    Continuous Blood Gluc Sensor (DEXCOM G6 SENSOR) MISC 1 Device by Does not apply route continuous 3 each 11    Continuous Blood Gluc Transmit (DEXCOM G6 TRANSMITTER) MISC 1 Device by Does not apply route continuous 1 each 3    Continuous Blood Gluc  (DEXCOM G6 ) [place] 1 Device by Does not apply route 4 times daily (before meals and nightly) 1 each 0    digoxin (LANOXIN) 125 MCG tablet Take 1 tablet by mouth every other day 45 tablet 3    albuterol-ipratropium (COMBIVENT RESPIMAT)  MCG/ACT AERS inhaler Inhale 1 puff into the lungs every 6 hours as needed for Wheezing or Shortness of Breath 4 g 5    ipratropium-albuterol (DUONEB) 0.5-2.5 (3) MG/3ML SOLN nebulizer solution Inhale 3 mLs into the lungs 3 times daily 360 mL 0    Cranberry 500 MG CAPS Take 500 mg by mouth daily      pantoprazole (PROTONIX) 40 MG tablet Take 1 tablet by mouth every morning (before breakfast) 90 tablet 3    Cholecalciferol (VITAMIN D3) 50 MCG (2000 UT) CAPS Take 1,000 Units by mouth 2 times daily       lidocaine-prilocaine (EMLA) 2.5-2.5 % cream APPLY CREAM TOPICALLY TO PORT SITE ONE HOUR PRIOR TO APPOINTMENT AS NEEDED      Handicap Placard MISC by Does not apply route Good from 7/15/21 - 7/15/26 1 each 0    rosuvastatin (CRESTOR) 40 MG tablet TAKE 1 TABLET BY MOUTH ONCE DAILY IN THE EVENING 90 tablet 3    Accu-Chek Softclix Lancets MISC bid 100 each 3    Blood Glucose Monitoring Suppl (ACCU-CHEK PHYLLIS CONNECT) w/Device KIT 1 kit by Does not apply route daily 1 kit 0     No current facility-administered medications for this visit. Review of Systems:   Review of Systems   Constitutional: Negative. Negative for diaphoresis and fatigue. HENT: Negative. Eyes: Negative. Respiratory: Negative. Negative for cough, chest tightness, shortness of breath, wheezing and stridor. Cardiovascular: Negative. Negative for chest pain, palpitations and leg swelling. Gastrointestinal: Negative. Negative for blood in stool and nausea. Genitourinary: Negative. Musculoskeletal: Negative. Skin: Negative. Neurological: Negative. Negative for dizziness, syncope, weakness and light-headedness. Hematological: Negative. Psychiatric/Behavioral: Negative. Physical Examination:    /64 (Site: Right Upper Arm, Position: Sitting, Cuff Size: Medium Adult)   Pulse 76   Resp 16   Ht 4' 11\" (1.499 m)   Wt 150 lb (68 kg)   LMP  (LMP Unknown)   SpO2 98%   BMI 30.30 kg/m²    Physical Exam   Constitutional: She appears healthy. No distress. HENT:   Normal cephalic and Atraumatic   Eyes: Pupils are equal, round, and reactive to light. Neck: Thyroid normal. No JVD present. No neck adenopathy. No thyromegaly present. Cardiovascular: Normal rate, regular rhythm, normal heart sounds, intact distal pulses and normal pulses. Pulmonary/Chest: Effort normal and breath sounds normal. She has no wheezes. She has no rales. She exhibits no tenderness. Abdominal: Soft. Bowel sounds are normal. There is no abdominal tenderness. Musculoskeletal:         General: No tenderness or edema. Normal range of motion. Cervical back: Normal range of motion and neck supple. Neurological: She is alert and oriented to person, place, and time. Skin: Skin is warm. No cyanosis. Nails show no clubbing.        LABS:  CBC:   Lab Results   Component Value Date    WBC 7.1 04/07/2022    RBC 4.38 04/07/2022    HGB 12.0 04/07/2022    HCT 37.7 04/07/2022    MCV 86.1 04/07/2022    MCH 27.3 04/07/2022    MCHC 31.7 04/07/2022    RDW 14.9 04/07/2022     04/07/2022    MPV 8.8 08/01/2015     Lipids:  Lab Results   Component Value Date    CHOL 106 04/07/2022    CHOL 143 11/30/2020    CHOL 105 02/24/2020     Lab Results   Component Value Date    TRIG 183 (H) 04/07/2022    TRIG 64 11/30/2020    TRIG 67 02/24/2020     Lab Results   Component Value Date    HDL 33 (L) 04/07/2022    HDL 61 (H) 11/30/2020    HDL 42 02/24/2020     Lab Results   Component Value Date    LDLCALC 36 04/07/2022    LDLCALC 69 11/30/2020    LDLCALC 50 02/24/2020     No results found for: LABVLDL, VLDL  Lab Results   Component Value Date    CHOLHDLRATIO 3.4 09/11/2012     CMP:    Lab Results   Component Value Date     04/07/2022    K 4.1 04/07/2022     04/07/2022    CO2 21 04/07/2022    BUN 24 04/07/2022    CREATININE 1.84 04/07/2022    GFRAA 32.1 04/07/2022    LABGLOM 26.6 04/07/2022    GLUCOSE 230 04/07/2022    PROT 6.9 04/06/2022    LABALBU 3.9 04/06/2022    CALCIUM 9.7 04/07/2022    BILITOT 0.3 04/06/2022    ALKPHOS 106 04/06/2022    AST 24 04/06/2022    ALT 18 04/06/2022     BMP:    Lab Results   Component Value Date     04/07/2022    K 4.1 04/07/2022     04/07/2022    CO2 21 04/07/2022    BUN 24 04/07/2022    LABALBU 3.9 04/06/2022    CREATININE 1.84 04/07/2022    CALCIUM 9.7 04/07/2022    GFRAA 32.1 04/07/2022    LABGLOM 26.6 04/07/2022    GLUCOSE 230 04/07/2022     Magnesium:    Lab Results   Component Value Date    MG 2.3 04/06/2022     TSH:  Lab Results   Component Value Date    TSH 0.474 11/30/2020       Patient Active Problem List   Diagnosis    Hypertension    Hyperlipidemia    Fibromyalgia    Anxiety    Depression    DJD (degenerative joint disease), lumbar    Lumbosacral radiculopathy at S1    Dysphonia    CTS (carpal tunnel syndrome)    High risk medication use-Norco - 12/20/17 OARRS PM&R, 02/20/18 OARRS PM&R, 03/07/18 OARRS PM&R, Urine Drug screen negative 02/06/17 PM&R--MED CONTRACT 2/6/17    SOB (shortness of breath) on exertion    Memory deficit    Chronic UTI    Artificial lens present    Presbyopia    Astigmatism, regular    Cataract, nuclear sclerotic senile    Regular astigmatism    Nuclear senile cataract    Lumbosacral radiculopathy at L5    Spinal stenosis of lumbar region with neurogenic claudication    Impaired mobility and activities of daily living    Diabetic asymmetric polyneuropathy (HCC)    Myalgia    Acute combined systolic and diastolic CHF, NYHA class 1 (HCC)    Uncontrolled type 2 diabetes mellitus with hyperglycemia (HCC)    Chronic obstructive pulmonary disease (HCC)    HOCM (hypertrophic obstructive cardiomyopathy) (HCC)    Anemia    AVM (arteriovenous malformation)    Anemia of chronic renal failure    Dysarthria    Other fatigue    Major depressive disorder in remission ([address]    Gastroesophageal reflux disease without esophagitis    Acute cystitis with hematuria    Gait abnormality    Late effects of CVA (cerebrovascular accident)    Acute respiratory failure with hypoxia (HCC)    Palliative care encounter    Bradycardia    Moderate hypoxic-ischemic encephalopathy    Infection due to ESBL-producing Escherichia coli    Port-A-Cath in place    Chronic kidney disease due to hypertension    History of GI bleed    Chronic right-sided thoracic back pain    Seizures (HCC)    Yeast infection    Chronic kidney disease, stage 3b ([address]    Diabetic renal disease ([facility]    Hypervolemia    Chronic kidney disease, stage 4 (severe) (HCC)    Other microscopic hematuria    Chronic combined systolic and diastolic CHF (congestive heart failure) (HCC)    Auditory hallucinations    Emotional lability    Hyperglycemia    Sleep disturbance    Internuclear ophthalmoplegia    Brainstem stroke (HCC)       There are no discontinued medications. Modified Medications    No medications on file       No orders of the defined types were placed in this encounter. Assessment/Plan:    1. Acute combined systolic and diastolic CHF, NYHA class 1 (HCC)   compensated   - Basic Metabolic Panel; Future    2. Essential hypertension   not controlled in AM-- will give Amlodipine 5 mg at [facility]. 3. Mixed hyperlipidemia     4.  HOCM (hypertrophic obstructive cardiomyopathy) ([facility]  Continue BB ACEI. And Dig.     5. Hypotension- stable now    6. Resp failure- stable. Need Therapy. Need to walk daily. Eat well. Take meds. 7. Intestinal Bleed- staty off ASA- if Hb stable will consider resume in future. 8. Recent CVA 4/7/22 - pontine- stable. 9. Carotid -  LICA > 70 by CUS but velocity is only 233. We will repeat CUS in 6 mo. Counseling:  Heart Healthy Lifestyle, Low Salt Diet, Take Precautions to Prevent Falls and Walk Daily    Return in about 3 months (around 7/28/2022).       Electronically signed by Sandi Hastings MD on 4/28/2022 at 1:52 PM

## 2022-05-10 ENCOUNTER — APPOINTMENT (OUTPATIENT)
Dept: GENERAL RADIOLOGY | Age: 77
End: 2022-05-10
Payer: MEDICARE

## 2022-05-10 ENCOUNTER — HOSPITAL ENCOUNTER (OUTPATIENT)
Age: 77
Setting detail: OBSERVATION
Discharge: HOME | End: 2022-05-11
Attending: STUDENT IN AN ORGANIZED HEALTH CARE EDUCATION/TRAINING PROGRAM | Admitting: INTERNAL MEDICINE
Payer: MEDICARE

## 2022-05-10 DIAGNOSIS — D64.9 ANEMIA, UNSPECIFIED TYPE: Primary | ICD-10-CM

## 2022-05-10 DIAGNOSIS — R06.09 DYSPNEA ON EXERTION: ICD-10-CM

## 2022-05-10 LAB
ABO/RH: NORMAL
ALBUMIN SERPL-MCNC: 3.6 G/DL (ref 3.5–4.6)
ALP BLD-CCNC: 101 U/L (ref 40–130)
ALT SERPL-CCNC: 14 U/L (ref 0–33)
ANION GAP SERPL CALCULATED.3IONS-SCNC: 15 MEQ/L (ref 9–15)
ANISOCYTOSIS: ABNORMAL
ANTIBODY SCREEN: NORMAL
APTT: 33.7 SEC (ref 24.4–36.8)
AST SERPL-CCNC: 19 U/L (ref 0–35)
BASOPHILS ABSOLUTE: 0 K/UL (ref 0–0.2)
BASOPHILS RELATIVE PERCENT: 0.9 %
BILIRUB SERPL-MCNC: <0.2 MG/DL (ref 0.2–0.7)
BLOOD BANK DISPENSE STATUS: NORMAL
BLOOD BANK PRODUCT CODE: NORMAL
BPU ID: NORMAL
BUN BLDV-MCNC: 33 MG/DL (ref 8–23)
CALCIUM SERPL-MCNC: 9 MG/DL (ref 8.5–9.9)
CHLORIDE BLD-SCNC: 107 MEQ/L (ref 95–107)
CO2: 18 MEQ/L (ref 20–31)
CREAT SERPL-MCNC: 2.25 MG/DL (ref 0.5–0.9)
DESCRIPTION BLOOD BANK: NORMAL
EKG ATRIAL RATE: 76 BPM
EKG P AXIS: 71 DEGREES
EKG P-R INTERVAL: 140 MS
EKG Q-T INTERVAL: 496 MS
EKG QRS DURATION: 70 MS
EKG QTC CALCULATION (BAZETT): 558 MS
EKG R AXIS: 41 DEGREES
EKG T AXIS: 81 DEGREES
EKG VENTRICULAR RATE: 76 BPM
EOSINOPHILS ABSOLUTE: 0.2 K/UL (ref 0–0.7)
EOSINOPHILS RELATIVE PERCENT: 2 %
GFR AFRICAN AMERICAN: 25.5
GFR NON-AFRICAN AMERICAN: 21
GLOBULIN: 2.7 G/DL (ref 2.3–3.5)
GLUCOSE BLD-MCNC: 120 MG/DL (ref 70–99)
GLUCOSE BLD-MCNC: 200 MG/DL (ref 70–99)
GLUCOSE BLD-MCNC: 257 MG/DL (ref 70–99)
HCT VFR BLD CALC: 21.1 % (ref 37–47)
HCT VFR BLD CALC: 23 % (ref 37–47)
HEMOGLOBIN: 6.3 G/DL (ref 12–16)
HEMOGLOBIN: 7.3 G/DL (ref 12–16)
HYPOCHROMIA: ABNORMAL
INR BLD: 1.4
LYMPHOCYTES ABSOLUTE: 0.7 K/UL (ref 1–4.8)
LYMPHOCYTES RELATIVE PERCENT: 9 %
MCH RBC QN AUTO: 26.5 PG (ref 27–31.3)
MCHC RBC AUTO-ENTMCNC: 29.7 % (ref 33–37)
MCV RBC AUTO: 89.5 FL (ref 82–100)
MONOCYTES ABSOLUTE: 0.3 K/UL (ref 0.2–0.8)
MONOCYTES RELATIVE PERCENT: 3.8 %
NEUTROPHILS ABSOLUTE: 6.9 K/UL (ref 1.4–6.5)
NEUTROPHILS RELATIVE PERCENT: 86 %
PDW BLD-RTO: 17 % (ref 11.5–14.5)
PERFORMED ON: ABNORMAL
PERFORMED ON: ABNORMAL
PLATELET # BLD: 364 K/UL (ref 130–400)
PLATELET SLIDE REVIEW: NORMAL
POLYCHROMASIA: ABNORMAL
POTASSIUM SERPL-SCNC: 4.3 MEQ/L (ref 3.4–4.9)
PRO-BNP: 1300 PG/ML
PROCALCITONIN: 0.06 NG/ML (ref 0–0.15)
PROTHROMBIN TIME: 16.7 SEC (ref 12.3–14.9)
RBC # BLD: 2.36 M/UL (ref 4.2–5.4)
SARS-COV-2, NAAT: NOT DETECTED
SODIUM BLD-SCNC: 140 MEQ/L (ref 135–144)
TOTAL CK: 313 U/L (ref 0–170)
TOTAL PROTEIN: 6.3 G/DL (ref 6.3–8)
TROPONIN: 0.04 NG/ML (ref 0–0.01)
TROPONIN: 0.06 NG/ML (ref 0–0.01)
WBC # BLD: 8 K/UL (ref 4.8–10.8)

## 2022-05-10 PROCEDURE — 99285 EMERGENCY DEPT VISIT HI MDM: CPT

## 2022-05-10 PROCEDURE — 36415 COLL VENOUS BLD VENIPUNCTURE: CPT

## 2022-05-10 PROCEDURE — G0378 HOSPITAL OBSERVATION PER HR: HCPCS

## 2022-05-10 PROCEDURE — 85730 THROMBOPLASTIN TIME PARTIAL: CPT

## 2022-05-10 PROCEDURE — 85014 HEMATOCRIT: CPT

## 2022-05-10 PROCEDURE — 36591 DRAW BLOOD OFF VENOUS DEVICE: CPT

## 2022-05-10 PROCEDURE — 87635 SARS-COV-2 COVID-19 AMP PRB: CPT

## 2022-05-10 PROCEDURE — 36430 TRANSFUSION BLD/BLD COMPNT: CPT

## 2022-05-10 PROCEDURE — 86901 BLOOD TYPING SEROLOGIC RH(D): CPT

## 2022-05-10 PROCEDURE — 86923 COMPATIBILITY TEST ELECTRIC: CPT

## 2022-05-10 PROCEDURE — 85018 HEMOGLOBIN: CPT

## 2022-05-10 PROCEDURE — 6370000000 HC RX 637 (ALT 250 FOR IP): Performed by: PHYSICIAN ASSISTANT

## 2022-05-10 PROCEDURE — 6370000000 HC RX 637 (ALT 250 FOR IP): Performed by: NURSE PRACTITIONER

## 2022-05-10 PROCEDURE — 86900 BLOOD TYPING SEROLOGIC ABO: CPT

## 2022-05-10 PROCEDURE — 83540 ASSAY OF IRON: CPT

## 2022-05-10 PROCEDURE — 83880 ASSAY OF NATRIURETIC PEPTIDE: CPT

## 2022-05-10 PROCEDURE — 6370000000 HC RX 637 (ALT 250 FOR IP): Performed by: INTERNAL MEDICINE

## 2022-05-10 PROCEDURE — 84484 ASSAY OF TROPONIN QUANT: CPT

## 2022-05-10 PROCEDURE — 84145 PROCALCITONIN (PCT): CPT

## 2022-05-10 PROCEDURE — 83550 IRON BINDING TEST: CPT

## 2022-05-10 PROCEDURE — 80053 COMPREHEN METABOLIC PANEL: CPT

## 2022-05-10 PROCEDURE — 82728 ASSAY OF FERRITIN: CPT

## 2022-05-10 PROCEDURE — P9016 RBC LEUKOCYTES REDUCED: HCPCS

## 2022-05-10 PROCEDURE — 86850 RBC ANTIBODY SCREEN: CPT

## 2022-05-10 PROCEDURE — 82550 ASSAY OF CK (CPK): CPT

## 2022-05-10 PROCEDURE — 93005 ELECTROCARDIOGRAM TRACING: CPT | Performed by: STUDENT IN AN ORGANIZED HEALTH CARE EDUCATION/TRAINING PROGRAM

## 2022-05-10 PROCEDURE — 85025 COMPLETE CBC W/AUTO DIFF WBC: CPT

## 2022-05-10 PROCEDURE — 85610 PROTHROMBIN TIME: CPT

## 2022-05-10 PROCEDURE — 71045 X-RAY EXAM CHEST 1 VIEW: CPT

## 2022-05-10 RX ORDER — PANTOPRAZOLE SODIUM 40 MG/1
40 TABLET, DELAYED RELEASE ORAL
Status: DISCONTINUED | OUTPATIENT
Start: 2022-05-11 | End: 2022-05-11 | Stop reason: HOSPADM

## 2022-05-10 RX ORDER — ONDANSETRON 2 MG/ML
4 INJECTION INTRAMUSCULAR; INTRAVENOUS EVERY 6 HOURS PRN
Status: CANCELLED | OUTPATIENT
Start: 2022-05-10

## 2022-05-10 RX ORDER — POLYETHYLENE GLYCOL 3350 17 G/17G
17 POWDER, FOR SOLUTION ORAL DAILY PRN
Status: DISCONTINUED | OUTPATIENT
Start: 2022-05-10 | End: 2022-05-11 | Stop reason: HOSPADM

## 2022-05-10 RX ORDER — SODIUM CHLORIDE 0.9 % (FLUSH) 0.9 %
5-40 SYRINGE (ML) INJECTION EVERY 12 HOURS SCHEDULED
Status: DISCONTINUED | OUTPATIENT
Start: 2022-05-10 | End: 2022-05-11 | Stop reason: HOSPADM

## 2022-05-10 RX ORDER — LIDOCAINE AND PRILOCAINE 25; 25 MG/G; MG/G
CREAM TOPICAL ONCE
Status: COMPLETED | OUTPATIENT
Start: 2022-05-10 | End: 2022-05-10

## 2022-05-10 RX ORDER — DIGOXIN 125 MCG
125 TABLET ORAL EVERY OTHER DAY
Status: DISCONTINUED | OUTPATIENT
Start: 2022-05-12 | End: 2022-05-11 | Stop reason: HOSPADM

## 2022-05-10 RX ORDER — SODIUM CHLORIDE 0.9 % (FLUSH) 0.9 %
5-40 SYRINGE (ML) INJECTION PRN
Status: DISCONTINUED | OUTPATIENT
Start: 2022-05-10 | End: 2022-05-11 | Stop reason: HOSPADM

## 2022-05-10 RX ORDER — INSULIN LISPRO 100 [IU]/ML
0-6 INJECTION, SOLUTION INTRAVENOUS; SUBCUTANEOUS NIGHTLY
Status: DISCONTINUED | OUTPATIENT
Start: 2022-05-10 | End: 2022-05-11 | Stop reason: HOSPADM

## 2022-05-10 RX ORDER — CARVEDILOL 12.5 MG/1
25 TABLET ORAL 2 TIMES DAILY WITH MEALS
Status: DISCONTINUED | OUTPATIENT
Start: 2022-05-11 | End: 2022-05-11 | Stop reason: HOSPADM

## 2022-05-10 RX ORDER — SODIUM CHLORIDE 9 MG/ML
INJECTION, SOLUTION INTRAVENOUS PRN
Status: DISCONTINUED | OUTPATIENT
Start: 2022-05-10 | End: 2022-05-11 | Stop reason: HOSPADM

## 2022-05-10 RX ORDER — AMLODIPINE BESYLATE 5 MG/1
5 TABLET ORAL DAILY
Status: DISCONTINUED | OUTPATIENT
Start: 2022-05-11 | End: 2022-05-11 | Stop reason: HOSPADM

## 2022-05-10 RX ORDER — ACETAMINOPHEN 650 MG/1
650 SUPPOSITORY RECTAL EVERY 6 HOURS PRN
Status: DISCONTINUED | OUTPATIENT
Start: 2022-05-10 | End: 2022-05-11 | Stop reason: HOSPADM

## 2022-05-10 RX ORDER — ROSUVASTATIN CALCIUM 20 MG/1
10 TABLET, COATED ORAL NIGHTLY
Status: DISCONTINUED | OUTPATIENT
Start: 2022-05-10 | End: 2022-05-11 | Stop reason: HOSPADM

## 2022-05-10 RX ORDER — INSULIN LISPRO 100 [IU]/ML
0-12 INJECTION, SOLUTION INTRAVENOUS; SUBCUTANEOUS
Status: DISCONTINUED | OUTPATIENT
Start: 2022-05-10 | End: 2022-05-11 | Stop reason: HOSPADM

## 2022-05-10 RX ORDER — INSULIN GLARGINE 100 [IU]/ML
65 INJECTION, SOLUTION SUBCUTANEOUS NIGHTLY
Status: DISCONTINUED | OUTPATIENT
Start: 2022-05-10 | End: 2022-05-11 | Stop reason: HOSPADM

## 2022-05-10 RX ORDER — DEXTROSE MONOHYDRATE 50 MG/ML
100 INJECTION, SOLUTION INTRAVENOUS PRN
Status: DISCONTINUED | OUTPATIENT
Start: 2022-05-10 | End: 2022-05-11 | Stop reason: HOSPADM

## 2022-05-10 RX ORDER — ONDANSETRON 4 MG/1
4 TABLET, ORALLY DISINTEGRATING ORAL EVERY 8 HOURS PRN
Status: CANCELLED | OUTPATIENT
Start: 2022-05-10

## 2022-05-10 RX ORDER — INSULIN LISPRO 100 [IU]/ML
15 INJECTION, SOLUTION INTRAVENOUS; SUBCUTANEOUS
Status: DISCONTINUED | OUTPATIENT
Start: 2022-05-11 | End: 2022-05-11 | Stop reason: HOSPADM

## 2022-05-10 RX ORDER — IPRATROPIUM BROMIDE AND ALBUTEROL SULFATE 2.5; .5 MG/3ML; MG/3ML
3 SOLUTION RESPIRATORY (INHALATION) EVERY 4 HOURS PRN
Status: DISCONTINUED | OUTPATIENT
Start: 2022-05-10 | End: 2022-05-11 | Stop reason: HOSPADM

## 2022-05-10 RX ORDER — ACETAMINOPHEN 325 MG/1
650 TABLET ORAL EVERY 6 HOURS PRN
Status: DISCONTINUED | OUTPATIENT
Start: 2022-05-10 | End: 2022-05-11 | Stop reason: HOSPADM

## 2022-05-10 RX ORDER — PAROXETINE 10 MG/1
40 TABLET, FILM COATED ORAL DAILY
Status: DISCONTINUED | OUTPATIENT
Start: 2022-05-11 | End: 2022-05-11 | Stop reason: HOSPADM

## 2022-05-10 RX ORDER — PYRIDOXINE HCL (VITAMIN B6) 100 MG
500 TABLET ORAL DAILY
Status: DISCONTINUED | OUTPATIENT
Start: 2022-05-11 | End: 2022-05-11 | Stop reason: HOSPADM

## 2022-05-10 RX ADMIN — INSULIN LISPRO 3 UNITS: 100 INJECTION, SOLUTION INTRAVENOUS; SUBCUTANEOUS at 22:13

## 2022-05-10 RX ADMIN — ROSUVASTATIN CALCIUM 10 MG: 20 TABLET, FILM COATED ORAL at 22:13

## 2022-05-10 RX ADMIN — LIDOCAINE AND PRILOCAINE: 25; 25 CREAM TOPICAL at 13:30

## 2022-05-10 RX ADMIN — INSULIN LISPRO 4 UNITS: 100 INJECTION, SOLUTION INTRAVENOUS; SUBCUTANEOUS at 18:10

## 2022-05-10 RX ADMIN — INSULIN GLARGINE 65 UNITS: 100 INJECTION, SOLUTION SUBCUTANEOUS at 22:13

## 2022-05-10 ASSESSMENT — PAIN - FUNCTIONAL ASSESSMENT
PAIN_FUNCTIONAL_ASSESSMENT: NONE - DENIES PAIN

## 2022-05-10 ASSESSMENT — ENCOUNTER SYMPTOMS
EYE DISCHARGE: 0
ABDOMINAL PAIN: 0
VOMITING: 0
RHINORRHEA: 0
SHORTNESS OF BREATH: 0
ABDOMINAL DISTENTION: 0
COLOR CHANGE: 0
NAUSEA: 0
SORE THROAT: 0
CONSTIPATION: 0

## 2022-05-10 NOTE — ED NOTES
Placed 20 g, 1.0 inch in power port utilizing sterile technique, blood return noted. Pt tolerated well.       Jordyn Raymundo RN  05/10/22 [id number]

## 2022-05-10 NOTE — H&P
[place] [address] MEDICINE    HISTORY AND PHYSICAL EXAM    PATIENT NAME:  Tree Kumar    MRN:  [id number]  SERVICE DATE:  5/10/2022   SERVICE TIME:  3:04 PM    Primary Care Physician: Jenny Singh MD     SUBJECTIVE  CHIEF COMPLAINT:  SOB    HPI:  Tree Kumar is a 67 y.o., female who has PMH of COPD, CHF, CKD4, CVA, GIB, HTN, HLD, DM2, depression, CHF, seizures who presents with c/o SOB, exertional SOB and dizziness. Reports progressive symptoms over the past week. States she has SOB when ambulating short distances. Has history of anemia requiring transfusions. Labs done in ED. H/H 6.3/21.1 Consented to blood transfusion. However patient was to be discharge to home once infusion is completed. Discussed with patient and daughter and they are in agreement to admission but patient states she will be signing out AMA once transfusion is completed. Upon exam, denies CP but reports SOB. No N/V/D. No reports or hematemesis or GIB. On apixaban. Admitted for further management. PAST MEDICAL HISTORY:    Past Medical History:   Diagnosis Date    Adenomatous polyp of sigmoid colon     Adenomatous polyp of transverse colon     Anxiety     Asthma     dx 2019 / has smoked since age 11    Brainstem stroke ([address]     Cardiopulmonary arrest ([address]     CHF (congestive heart failure) (HCC)     Chronic back pain     Bilateral L5 S1 Radic on emg--surprisingly worse on the left than the right--pt's symptoms and her MRI show worse on the right    Chronic obstructive pulmonary disease with acute exacerbation ([address] 10/12/2019    Depression     ESBL E. coli carrier     Carrier.     Fibromyalgia     Gastrointestinal hemorrhage 2/24/2020    Hyperlipidemia     meds > 8 yrs    Hypertension     meds > 45 yrs    Insomnia 12/4/2013    On home O2     2l per n/c at bedtime mostly,     Osteoarthritis     Seizures ([address]     Sepsis ([address] 10/6/2020    Smoker 6/18/2013    Type II diabetes mellitus, uncontrolled ([address] hx > 8 yrs    Unspecified sleep apnea      PAST SURGICAL HISTORY:    Past Surgical History:   Procedure Laterality Date    BACK SURGERY  2017    lumbar disc    CARDIAC CATHETERIZATION  2014    DR. MIRELES / no stents    COLONOSCOPY  2016    w/polypectomy     COLONOSCOPY N/A 2020    COLONOSCOPY WITH POLYPECTOMY performed by Heavenly Ayers MD at [facility] N/A 10/07/2019    EUA HYSTEROSCOPY DILATATION AND CURETTAGE performed by Lorne Durham DO at [facility]. [address], COLON, DIAGNOSTIC      EYE SURGERY      [place] with IOL OU / [address]    umbilical hernia repair    IR PORT PLACEMENT EQUAL OR GREATER THAN 5 YEARS  2021    IR PORT PLACEMENT EQUAL OR GREATER THAN 5 YEARS 2021 MLOZ SPECIAL PROCEDURE    [place] ESOPHAGOGASTRODUODENOSCOPY TRANSORAL DIAGNOSTIC N/A 2017    EGD ESOPHAGOGASTRODUODENOSCOPY performed by Samantha Garcia MD at [place] [address] 2018    negative findings    [place] REVISE MEDIAN N/CARPAL TUNNEL SURG Left 2017    LEFT  CARPAL TUNNEL RELEASE performed by Belinda Harrington MD at [facility],9+B/K,XIEHK N/A 2018    TONSILLECTOMY      as child    TUNNELED VENOUS PORT PLACEMENT Right 2021    8 Fr [employer] Port ClearVUE by Dr. Whitney Grossman  2016    w/bx     UPPER GASTROINTESTINAL ENDOSCOPY N/A 2020    EGD possible biopsy performed by Heavenly Ayers MD at [facility] N/A 2020    EGD PUSH ENTEROSCOPY performed by Luis Angel Hunter MD at [facility]:    Family History   Problem Relation Age of Onset    Heart Disease Father         cardiac bypass    Arthritis Father     Arthritis Mother     Other Mother          at age 80    Other Sister         nuknown health hx    No Known Problems Daughter     Stroke Son      SOCIAL HISTORY:    Social History     Socioeconomic History    Marital status:      Spouse name: Adalgisa Enriquez Number of children: 2    Years of education: 15    Highest education level: High school graduate   Occupational History    Occupation: Retired-   Tobacco Use    Smoking status: Former Smoker     Packs/day: 1.00     Years: 59.00     Pack years: 59.00     Types: Cigarettes     Start date: 2017     Quit date: 10/1/2020     Years since quittin.6    Smokeless tobacco: Never Used   Vaping Use    Vaping Use: Never used   Substance and Sexual Activity    Alcohol use: Not Currently    Drug use: No    Sexual activity: Yes     Partners: Male   Other Topics Concern    Not on file   Social History Narrative    Grew up in [address] [place] [address] With:  [id number] Spouse    Type of Home: House    Home Layout: Two level, Performs ADL's on one level    Home Access: Stairs to enter with rails    Entrance Stairs - Number of Steps: 4    Bathroom Shower/Tub: Tub/Shower unit, Doors    Bathroom Equipment: Shower chair, Grab bars in [address] [place]: Rolling walker, Cane, Oxygen(uses her O2 very seldom)    ADL Assistance: Needs assistance    Homemaking Assistance: Needs assistance    Homemaking Responsibilities: No(spouse performs)    Ambulation Assistance: Independent    Transfer Assistance: Independent    Active : No    Occupation: Retired    Type of occupation: worked in [employer]: patient enjoys [address] [place] [address] [employer] Strain: Low Risk     Difficulty of Paying Living Expenses: Not hard at [employer] Insecurity: No Food Insecurity    Worried About [facility] in the Last Year: Never true   [address] in the Last Year: Never true   Transportation Needs:     Lack of Transportation (Medical): Not on file    Lack of Transportation (Non-Medical):  Not on file   Physical Activity:     Days of Exercise per Week: Not on file    Minutes of Exercise per Session: Not on file   Stress:     Feeling of Stress : Not on file   Social Connections:     Frequency of Communication with Friends and Family: Not on file    Frequency of Social Gatherings with Friends and Family: Not on file    Attends Confucianist Services: Not on file    Active Member of [address] [place] [address] or Organizations: Not on file    Attends Club or Organization Meetings: Not on file    Marital Status: Not on file   Intimate Partner Violence:     Fear of Current or Ex-Partner: Not on file    Emotionally Abused: Not on file    Physically Abused: Not on file    Sexually Abused: Not on file   Housing Stability:     Unable to Pay for Housing in the Last Year: Not on file    Number of [place] in the Last Year: Not on file    Unstable Housing in the Last Year: Not on file     MEDICATIONS:   Prior to Admission medications    Medication Sig Start Date End Date Taking?  Authorizing Provider   insulin glargine (LANTUS) 100 UNIT/ML injection vial INJECT 65 UNITS SUBCUTANEOUSLY AT BEDTIME.  E11.69 4/25/22   DIANE Matos   apixaban (ELIQUIS) 2.5 MG TABS tablet Take 1 tablet by mouth 2 times daily 4/25/22   JOHNATHON Pinzon - CNP   carvedilol (COREG) 25 MG tablet TAKE 1 & 1/2 (ONE & ONE-HALF) TABLETS BY MOUTH TWICE DAILY WITH MEALS 3/29/22   JOHNATHON Olivia CNP   PARoxetine (PAXIL) 40 MG tablet TAKE 1 TABLET BY MOUTH ONCE DAILY IN THE MORNING  Patient taking differently: at bedtime TAKE 1 TABLET BY MOUTH ONCE DAILY IN THE EVENING 3/29/22   Ryan Guerrero MD   insulin lispro (HUMALOG) 100 UNIT/ML injection vial INJECT 15 UNITS SUBCUTANEOUSLY WITH EACH MEAL  Patient taking differently: INJECT 26 UNITS SUBCUTANEOUSLY WITH [facility] MEAL 2/28/22   DIANE Matos   ACCU-CHEK PHYLLIS PLUS strip Test 3x daily Dx E11.65 2/28/22   DIANE Matos   nitrofurantoin (MACRODANTIN) 50 MG capsule Take 1 capsule by mouth nightly 2/11/22 5/12/22  Ronak Johnson MD   amLODIPine (NORVASC) 5 MG tablet Take 1 tablet by mouth daily 1/24/22   Ho Guzman MD   glucose 4 g chewable tablet Take 4 tablets by mouth as needed for Low blood sugar 12/9/21   DIANE Karimi   Glucagon (GVOKE HYPOPEN 2-PACK) 1 MG/0.2ML SOAJ Inject 1 mg into the skin every 15 minutes as needed (glucose less tahtn 70 or if symptomatic) 12/9/21   DIANE Karimi   Continuous Blood Gluc Sensor (DEXCOM G6 SENSOR) MISC 1 Device by Does not apply route continuous 12/9/21   DIANE Karimi   Continuous Blood Gluc Transmit (DEXCOM G6 TRANSMITTER) MISC 1 Device by Does not apply route continuous 12/9/21   DIANE Karimi   Continuous Blood Gluc  (DEXCOM G6 ) [place] 1 Device by Does not apply route 4 times daily (before meals and nightly) 12/9/21   DIANE Karimi   digoxin (LANOXIN) 125 MCG tablet Take 1 tablet by mouth every other day 11/5/21   Ho Guzman MD   albuterol-ipratropium (COMBIVENT RESPIMAT)  MCG/ACT AERS inhaler Inhale 1 puff into the lungs every 6 hours as needed for Wheezing or Shortness of Breath 10/13/21   Aquilino Miller MD   ipratropium-albuterol (DUONEB) 0.5-2.5 (3) MG/3ML SOLN nebulizer solution Inhale 3 mLs into the lungs 3 times daily 10/13/21   Aquilino Miller MD   Cranberry 500 MG CAPS Take 500 mg by mouth daily    Historical Provider, MD   pantoprazole (PROTONIX) 40 MG tablet Take 1 tablet by mouth every morning (before breakfast) 8/19/21   JOHNATHON Lizarraga - CNP   Cholecalciferol (VITAMIN D3) 50 MCG (2000 UT) CAPS Take 1,000 Units by mouth 2 times daily     Historical Provider, MD   lidocaine-prilocaine (EMLA) 2.5-2.5 % cream APPLY CREAM TOPICALLY TO PORT SITE ONE HOUR PRIOR TO APPOINTMENT AS NEEDED 6/16/21   Historical Provider, MD   Handicap Placard MISC by Does not apply route Good from 7/15/21 - 7/15/26 7/15/21   Aquilino Miller MD   rosuvastatin (CRESTOR) 40 MG tablet TAKE 1 TABLET BY MOUTH ONCE DAILY IN THE EVENING 6/18/21   Mine Benson MD   Accu-Chek Softclix Lancets MISC bid 1/18/21   Regla Lang MD   Blood Glucose Monitoring Suppl (ACCU-CHEK PHYLLIS CONNECT) w/Device KIT 1 kit by Does not apply route daily 1/18/21   Regla Lang MD       ALLERGIES: Ibuprofen, Metformin and related, and Darvon [propoxyphene hcl]    REVIEW OF SYSTEM:   12 point ROS negative unless indicated below or in the HPI    OBJECTIVE  PHYSICAL EXAM:   Physical Exam  Constitutional:       General: She is not in acute distress. Appearance: She is ill-appearing. She is not toxic-appearing. HENT:      Head: Normocephalic and atraumatic. Nose: Nose normal.      Mouth/Throat:      Mouth: Mucous membranes are dry. Pharynx: Oropharynx is clear. Eyes:      Conjunctiva/sclera: Conjunctivae normal.   Cardiovascular:      Rate and Rhythm: Regular rhythm. Pulmonary:      Effort: Pulmonary effort is normal.   Abdominal:      General: There is no distension. Palpations: Abdomen is soft. Musculoskeletal:         General: Normal range of motion. Cervical back: Normal range of motion. Skin:     General: Skin is dry. Capillary Refill: Capillary refill takes less than 2 seconds. Coloration: Skin is pale. Neurological:      Mental Status: She is alert and oriented to person, place, and time. Psychiatric:         Mood and Affect: Mood normal.        BP (!) 130/58   Pulse 79   Temp 98.8 °F (37.1 °C) (Oral)   Resp 18   Ht 4' 11\" (1.499 m)   Wt 146 lb (66.2 kg)   LMP  (LMP Unknown)   SpO2 100%   BMI 29.49 kg/m²   Vitals:    05/10/22 1144 05/10/22 1308 05/10/22 1420   BP: (!) 108/57 (!) 131/52 (!) 130/58   Pulse: 75 76 79   Resp: 20 18 18   Temp: 98.8 °F (37.1 °C)     TempSrc: Oral     SpO2: 99% 100%    Weight: 146 lb (66.2 kg)     Height: 4' 11\" (1.499 m)         DATA:     Diagnostic tests reviewed for today's visit:    Most recent labs and imaging results reviewed.      LABS: Recent Results (from the past 24 hour(s))   EKG 12 Lead    Collection Time: 05/10/22 12:04 PM   Result Value Ref Range    Ventricular Rate 76 BPM    Atrial Rate 76 BPM    P-R Interval 140 ms    QRS Duration 70 ms    Q-T Interval 496 ms    QTc Calculation (Bazett) 558 ms    P Axis 71 degrees    R Axis 41 degrees    T Axis 81 degrees   Procalcitonin    Collection Time: 05/10/22 12:50 PM   Result Value Ref Range    Procalcitonin 0.06 0.00 - 0.15 ng/mL   Comprehensive Metabolic Panel    Collection Time: 05/10/22 12:51 PM   Result Value Ref Range    Sodium 140 135 - 144 mEq/L    Potassium 4.3 3.4 - 4.9 mEq/L    Chloride 107 95 - 107 mEq/L    CO2 18 (L) 20 - 31 mEq/L    Anion Gap 15 9 - 15 mEq/L    Glucose 120 (H) 70 - 99 mg/dL    BUN 33 (H) 8 - 23 mg/dL    CREATININE 2.25 (H) 0.50 - 0.90 mg/dL    GFR Non-African American 21.0 (L) >60    GFR  25.5 (L) >60    Calcium 9.0 8.5 - 9.9 mg/dL    Total Protein 6.3 6.3 - 8.0 g/dL    Albumin 3.6 3.5 - 4.6 g/dL    Total Bilirubin <0.2 0.2 - 0.7 mg/dL    Alkaline Phosphatase 101 40 - 130 U/L    ALT 14 0 - 33 U/L    AST 19 0 - 35 U/L    Globulin 2.7 2.3 - 3.5 g/dL   CBC with Auto Differential    Collection Time: 05/10/22 12:51 PM   Result Value Ref Range    WBC 8.0 4.8 - 10.8 K/uL    RBC 2.36 (L) 4.20 - 5.40 M/uL    Hemoglobin 6.3 (LL) 12.0 - 16.0 g/dL    Hematocrit 21.1 (L) 37.0 - 47.0 %    MCV 89.5 82.0 - 100.0 fL    MCH 26.5 (L) 27.0 - 31.3 pg    MCHC 29.7 (L) 33.0 - 37.0 %    RDW 17.0 (H) 11.5 - 14.5 %    Platelets 950 169 - 867 K/uL    PLATELET SLIDE REVIEW Normal     Neutrophils % 86.0 %    Lymphocytes % 9.0 %    Monocytes % 3.8 %    Eosinophils % 2.0 %    Basophils % 0.9 %    Neutrophils Absolute 6.9 (H) 1.4 - 6.5 K/uL    Lymphocytes Absolute 0.7 (L) 1.0 - 4.8 K/uL    Monocytes Absolute 0.3 0.2 - 0.8 K/uL    Eosinophils Absolute 0.2 0.0 - 0.7 K/uL    Basophils Absolute 0.0 0.0 - 0.2 K/uL    Anisocytosis 1+     Polychromasia 2+     Hypochromia 1+    Troponin Collection Time: 05/10/22 12:51 PM   Result Value Ref Range    Troponin 0.055 (HH) 0.000 - 0.010 ng/mL   CK    Collection Time: 05/10/22 12:51 PM   Result Value Ref Range    Total  (H) 0 - 170 U/L   Brain Natriuretic Peptide    Collection Time: 05/10/22 12:51 PM   Result Value Ref Range    Pro-BNP 1,300 pg/mL   Protime-INR    Collection Time: 05/10/22 12:51 PM   Result Value Ref Range    Protime 16.7 (H) 12.3 - 14.9 sec    INR 1.4    APTT    Collection Time: 05/10/22 12:51 PM   Result Value Ref Range    aPTT 33.7 24.4 - 36.8 sec   COVID-19, Rapid    Collection Time: 05/10/22 12:56 PM    Specimen: Nasopharyngeal Swab   Result Value Ref Range    SARS-CoV-2, NAAT Not Detected Not Detected       IMAGING:  XR CHEST PORTABLE    Result Date: 5/10/2022  Exam: XR CHEST PORTABLE History:  sob Technique: AP portable view of the chest obtained. Comparison: none Chest x-ray portable Findings: There is a right single lumen chest port on the right with the tip in the region of superior vena cava. The cardiomediastinal silhouette is within normal limits. There are no infiltrates, consolidations or effusions. . Bones of the thorax appear intact. No radiographic evidence of acute intrathoracic process. VTE Prophylaxis: SCDs    ASSESSMENT AND PLAN    67 y.o. male with PMH of COPD, CHF, CKD4, CVA, GIB, HTN, HLD, DM2, depression, CHF, seizures presented with:    Symptomatic anemia  -Transfuse 1 unit PRBC  -Repeat H/H post transfusion  -Hold apixaban  -CBC in am    Elevated troponin  -Likely demand ischemia and in setting of CKD4  -Cycle and trend  -telemetry monitoring    Prolonged QTc  -Goal K and Mg 4.0 and 2.0   -Discontinue QT prolonging agents wherever possible  -Repeat EKG in AM  -Continue Telemetry.      CKD4  -Monitor    DM2  -SSI  -Home meds    Plan of care discussed with: patient and family    SIGNATURE: JOHNATHON Campoverde NP  DATE: May 10, 2022  TIME: 3:04 PM

## 2022-05-10 NOTE — CARE COORDINATION
05/10/22    From: Home with spouse and daughter, independent at baseline. Has walker, WC, stairlift. Admit: To ER c/o progressively worsening SOB and dizziness. PMH: COPD, CHF, CKD4, CVA, GI bleed, DM2, seizures, htn. Anticipated Discharge Disposition: Return home with family, additional needs TBD pending work-up. Patient Mobility or PT/OT ordered:     Consults:     Covid result &/or vacc status: Vaccinated x2. Barriers to Discharge:  - Low H/H --> needs transfusion. Assessments: CMI done.

## 2022-05-10 NOTE — ED NOTES
Lab notified of needed labs. Phlebotomist will be to patient room as soon as possible.       Nia Balderrama RN  05/10/22 [id number]

## 2022-05-10 NOTE — ED PROVIDER NOTES
1981 [facility]  eMERGENCY dEPARTMENT eNCOUnter      Pt Name: Ellen Lee  MRN: [id number]  [place] 1945  Date of evaluation: 5/10/2022  Provider: Lisseth Beasley PA-C    CHIEF COMPLAINT       Chief Complaint   Patient presents with    Shortness of Breath         HISTORY OF PRESENT ILLNESS   (Location/Symptom, Timing/Onset,Context/Setting, Quality, Duration, Modifying Factors, Severity)  Note limiting factors. Ellen Lee is a 67 y.o. female who presents to the emergency department complaint of shortness of breath which patient states progressively gotten worse over the last 4 to 5 days, she states she can only ambulate short distances 10 or 15 feet before she gets so short of breath she has to stop and catch her breath. She denies any chest pain, she has a cough which is dry nonproductive, no fevers. She denies any nausea vomiting, no other recent ill contacts. Past medical tree significant for fibromyalgia, osteoarthritis, type 2 diabetes, asthma, anxiety, chronic back pain, hyperlipidemia, CHF, seizures, CVA, hypertension tobacco abuse    HPI    NursingNotes were reviewed. REVIEW OF SYSTEMS    (2-9 systems for level 4, 10 or more for level 5)     Review of Systems   Constitutional: Negative for activity change and appetite change. HENT: Negative for congestion, ear discharge, ear pain, nosebleeds, rhinorrhea and sore throat. Eyes: Negative for discharge. Respiratory: Negative for shortness of breath. Cardiovascular: Negative for chest pain, palpitations and leg swelling. Gastrointestinal: Negative for abdominal distention, abdominal pain, constipation, nausea and vomiting. Genitourinary: Negative for difficulty urinating and dysuria. Musculoskeletal: Negative for arthralgias. Skin: Negative for color change. Neurological: Negative for dizziness, syncope, numbness and headaches. Psychiatric/Behavioral: Negative for agitation and confusion.        Except as noted above the remainder of the review of systems was reviewed and negative. PAST MEDICAL HISTORY     Past Medical History:   Diagnosis Date    Adenomatous polyp of sigmoid colon     Adenomatous polyp of transverse colon     Anxiety     Asthma     dx 2019 / has smoked since age 11    Brainstem stroke ([address]     Cardiopulmonary arrest ([address]     CHF (congestive heart failure) (HCC)     Chronic back pain     Bilateral L5 S1 Radic on emg--surprisingly worse on the left than the right--pt's symptoms and her MRI show worse on the right    Chronic obstructive pulmonary disease with acute exacerbation ([address] 10/12/2019    Depression     ESBL E. coli carrier     Carrier.  Fibromyalgia     Gastrointestinal hemorrhage 2/24/2020    Hyperlipidemia     meds > 8 yrs    Hypertension     meds > 45 yrs    Insomnia 12/4/2013    On home O2     2l per n/c at bedtime mostly,     Osteoarthritis     Seizures ([address]     Sepsis ([address] 10/6/2020    Smoker 6/18/2013    Type II diabetes mellitus, uncontrolled ([address]     hx > 8 yrs    Unspecified sleep apnea          SURGICALHISTORY       Past Surgical History:   Procedure Laterality Date    BACK SURGERY  2017    lumbar disc    CARDIAC CATHETERIZATION  11/03/2014    DR. MIRELES / no stents    COLONOSCOPY  08/29/2016    w/polypectomy     COLONOSCOPY N/A 09/29/2020    COLONOSCOPY WITH POLYPECTOMY performed by Alec Farnsworth MD at [facility] N/A 10/07/2019    EUA HYSTEROSCOPY DILATATION AND CURETTAGE performed by Geraldine Olsen DO at [facility]. [address], COLON, DIAGNOSTIC      EYE SURGERY      [place] with IOL OU / [address]    umbilical hernia repair    IR PORT PLACEMENT EQUAL OR GREATER THAN 5 YEARS  5/14/2021    IR PORT PLACEMENT EQUAL OR GREATER THAN 5 YEARS 5/14/2021 M[facility] SPECIAL PROCEDURE    [place] ESOPHAGOGASTRODUODENOSCOPY TRANSORAL DIAGNOSTIC N/A 03/24/2017    EGD ESOPHAGOGASTRODUODENOSCOPY performed by Lia Ramsey MD at [facility] N/A 02/08/2018    negative findings    FL REVISE MEDIAN N/CARPAL TUNNEL SURG Left 06/05/2017    LEFT  CARPAL TUNNEL RELEASE performed by Josef Floyd MD at [address] [facility] N/A 02/08/2018    TONSILLECTOMY      as child    TUNNELED VENOUS PORT PLACEMENT Right 05/14/2021    [employer] [address] [employer] Port ClearVUE by Dr. Velma Loyola  08/26/2016    w/bx     UPPER GASTROINTESTINAL ENDOSCOPY N/A 02/25/2020    EGD possible biopsy performed by Angela Jiménez MD at [facility] N/A 07/01/2020    EGD PUSH ENTEROSCOPY performed by Isma Briggs MD at [address] [facility]       Discharge Medication List as of 5/11/2022  5:58 PM      CONTINUE these medications which have NOT CHANGED    Details   insulin glargine (LANTUS) 100 UNIT/ML injection vial INJECT 65 UNITS SUBCUTANEOUSLY AT BEDTIME.  E11.69, Disp-30 mL, R-3Normal      carvedilol (COREG) 25 MG tablet TAKE 1 & 1/2 (ONE & ONE-HALF) TABLETS BY MOUTH TWICE DAILY WITH MEALS, Disp-270 tablet, R-0Normal      PARoxetine (PAXIL) 40 MG tablet TAKE 1 TABLET BY MOUTH ONCE DAILY IN THE MORNING, Disp-90 tablet, R-1Normal      ACCU-CHEK PHYLLIS PLUS strip Test 3x daily Dx E11.65, Disp-100 each, R-3, DAWNormal      insulin lispro (HUMALOG) 100 UNIT/ML injection vial INJECT 15 UNITS SUBCUTANEOUSLY WITH EACH MEAL, Disp-10 mL, R-3Normal      nitrofurantoin (MACRODANTIN) 50 MG capsule Take 1 capsule by mouth nightly, Disp-90 capsule, R-3Normal      amLODIPine (NORVASC) 5 MG tablet Take 1 tablet by mouth daily, Disp-90 tablet, R-3Normal      glucose 4 g chewable tablet Take 4 tablets by mouth as needed for Low blood sugar, Disp-60 tablet, R-3Normal      Glucagon (GVOKE HYPOPEN 2-PACK) 1 MG/0.2ML SOAJ Inject 1 mg into the skin every 15 minutes as needed Heart Disease Father         cardiac bypass    Arthritis Father     Arthritis Mother     Other Mother          at age 80    Other Sister         ganesh [facility]    No Known Problems Daughter     Stroke Son           SOCIAL HISTORY       Social History     Socioeconomic History    Marital status:      Spouse name: Roque Goins Number of children: 2    Years of education: 15    Highest education level: High school graduate   Occupational History    Occupation: Retired-   Tobacco Use    Smoking status: Former Smoker     Packs/day: 1.00     Years: 59.00     Pack years: 59.00     Types: Cigarettes     Start date: 2017     Quit date: 10/1/2020     Years since quittin.6    Smokeless tobacco: Never Used   Vaping Use    Vaping Use: Never used   Substance and Sexual Activity    Alcohol use: Not Currently    Drug use: No    Sexual activity: Yes     Partners: Male   Other Topics Concern    None   Social History Narrative    Grew up in [address] [place] [address] With:  Librado Spouse    Type of Home: House    Home Layout: Two level, Performs ADL's on one level    Home Access: Stairs to enter with rails    Entrance Stairs - Number of Steps: 4    Bathroom Shower/Tub: Tub/Shower unit, Doors    Bathroom Equipment: Shower chair, Grab bars in [facility]: Rolling walker, Cane, Oxygen(uses her O2 very seldom)    ADL Assistance: Needs assistance    Homemaking Assistance: Needs assistance    Homemaking Responsibilities: No(spouse performs)    Ambulation Assistance: Independent    Transfer Assistance: Independent    Active : No    Occupation: Retired    Type of occupation: worked in [employer]: patient enjoys [address] [facility] Strain: Low Risk     Difficulty of Paying Living Expenses: Not hard at all   Food Insecurity: No Food Insecurity    Worried About [address] [employer] in the Last Year: Never true   Marielos Ran Out of Food in the Last Year: Never true   Transportation Needs:     Lack of Transportation (Medical): Not on file    Lack of Transportation (Non-Medical): Not on file   Physical Activity:     Days of Exercise per Week: Not on file    Minutes of Exercise per Session: Not on file   Stress:     Feeling of Stress : Not on file   Social Connections:     Frequency of Communication with Friends and Family: Not on file    Frequency of Social Gatherings with Friends and Family: Not on file    Attends [employer] Services: Not on file    Active Member of [address] [place] [address] [employer] or Organizations: Not on file    Attends Club or Organization Meetings: Not on file    Marital Status: Not on file   Intimate Partner Violence:     Fear of Current or Ex-Partner: Not on file    Emotionally Abused: Not on file    Physically Abused: Not on file    Sexually Abused: Not on file   Housing Stability:     Unable to Pay for Housing in the Last Year: Not on file    Number of [place] in the Last Year: Not on file    Unstable Housing in the Last Year: Not on file       SCREENINGS    [place] Coma Scale  Eye Opening: Spontaneous  Best Verbal Response: Oriented  Best Motor Response: Obeys commands  [place] Coma Scale Score: 15 @FLOW([id number])@      PHYSICAL EXAM    (up to 7 for level 4, 8 or more for level 5)     ED Triage Vitals [05/10/22 1144]   BP Temp Temp Source Pulse Resp SpO2 Height Weight   (!) 108/57 98.8 °F (37.1 °C) Oral 75 20 99 % 4' 11\" (1.499 m) 146 lb (66.2 kg)       Physical Exam  Vitals and nursing note reviewed. Constitutional:       General: She is not in acute distress. Appearance: She is well-developed. She is not ill-appearing, toxic-appearing or diaphoretic. HENT:      Head: Normocephalic. Nose: No congestion. Mouth/Throat:      Mouth: Mucous membranes are moist.      Pharynx: No oropharyngeal exudate or posterior oropharyngeal erythema.    Eyes:      Extraocular Movements: Extraocular movements intact. Conjunctiva/sclera: Conjunctivae normal.      Pupils: Pupils are equal, round, and reactive to light. Neck:      Vascular: No JVD. Trachea: No tracheal deviation. Cardiovascular:      Rate and Rhythm: Normal rate. Pulses: Normal pulses. Heart sounds: Normal heart sounds. No murmur heard. No friction rub. No gallop. Pulmonary:      Effort: Pulmonary effort is normal. No tachypnea, accessory muscle usage, respiratory distress or retractions. Breath sounds: No stridor. Examination of the right-upper field reveals decreased breath sounds. Examination of the left-upper field reveals decreased breath sounds. Examination of the right-middle field reveals decreased breath sounds. Examination of the left-middle field reveals decreased breath sounds. Examination of the right-lower field reveals decreased breath sounds. Examination of the left-lower field reveals decreased breath sounds. Decreased breath sounds present. No wheezing, rhonchi or rales. Comments: Lung sounds are clear but diminished in all fields, there is mild crackles noted to right upper lung, patient able to speak in full sentences without acute distress. Room air saturations are 98%. Chest:      Chest wall: No tenderness. Abdominal:      General: Abdomen is flat. Bowel sounds are normal. There is no distension or abdominal bruit. Palpations: There is no shifting dullness, fluid wave, hepatomegaly, splenomegaly, mass or pulsatile mass. Tenderness: There is no abdominal tenderness. There is no right CVA tenderness, left CVA tenderness, guarding or rebound. Negative signs include Vee's sign, Rovsing's sign and McBurney's sign. Musculoskeletal:         General: No deformity. Cervical back: Normal range of motion and neck supple. No rigidity. Right lower leg: No edema. Left lower leg: No edema. Skin:     General: Skin is warm and dry.       Capillary Refill: Capillary refill takes less than 2 seconds. Coloration: Skin is not jaundiced. Neurological:      General: No focal deficit present. Mental Status: She is alert and oriented to person, place, and time. Mental status is at baseline. Cranial Nerves: No cranial nerve deficit. Sensory: No sensory deficit. Motor: No weakness. Coordination: Coordination normal.   Psychiatric:         Mood and Affect: Mood normal.         DIAGNOSTIC RESULTS     EKG: All EKG's are interpreted by the Emergency Department Physician who either signs or Co-signsthis chart in the absence of a cardiologist.    EKG shows normal sinus rhythm 76 bpm there is T wave inversions leads I to V4 V5 V6 no ventricular ectopy  ms    RADIOLOGY:   Non-plain filmimages such as CT, Ultrasound and MRI are read by the radiologist. Plain radiographic images are visualized and preliminarily interpreted by the emergency physician with the below findings:        Interpretation per the Radiologist below, if available at the time ofthis note:    XR CHEST PORTABLE   Final Result      No radiographic evidence of acute intrathoracic process.                   ED BEDSIDE ULTRASOUND:   Performed by ED Physician - none    LABS:  Labs Reviewed   COMPREHENSIVE METABOLIC PANEL - Abnormal; Notable for the following components:       Result Value    CO2 18 (*)     Glucose 120 (*)     BUN 33 (*)     CREATININE 2.25 (*)     GFR Non- 21.0 (*)     GFR  25.5 (*)     All other components within normal limits   CBC WITH AUTO DIFFERENTIAL - Abnormal; Notable for the following components:    RBC 2.36 (*)     Hemoglobin 6.3 (*)     Hematocrit 21.1 (*)     MCH 26.5 (*)     MCHC 29.7 (*)     RDW 17.0 (*)     Neutrophils Absolute 6.9 (*)     Lymphocytes Absolute 0.7 (*)     All other components within normal limits    Narrative:     CALL  [facility]  LC[facility] tel. [phone],  HGB results called to and read back by Kori Mckenzie, 05/10/2022 13:09, by [facility] TROPONIN - Abnormal; Notable for the following components:    Troponin 0.055 (*)     All other components within normal limits    Narrative:     Abeba Camacho tel. [phone],  TROP results called to and read back by TADEO KRUEGER RN, 05/10/2022 14:01, by  SAMANTA   CK - Abnormal; Notable for the following components:     Total  (*)     All other components within normal limits   PROTIME-INR - Abnormal; Notable for the following components:    Protime 16.7 (*)     All other components within normal limits   IRON AND TIBC - Abnormal; Notable for the following components:    Iron 25 (*)     Iron Saturation 7 (*)     All other components within normal limits   TROPONIN - Abnormal; Notable for the following components:    Troponin 0.045 (*)     All other components within normal limits    Narrative:     Beverly Barnard  LC4W tel. [phone],  TROP results called to and read back by KINA SAAB [facility] RN, 05/10/2022 17:32,  by Kailyn Hebert   BASIC METABOLIC PANEL W/ REFLEX TO MG FOR LOW K - Abnormal; Notable for the following components:    Chloride 110 (*)     Glucose 105 (*)     BUN 29 (*)     CREATININE 2.08 (*)     GFR Non- 23.0 (*)     GFR  27.9 (*)     All other components within normal limits   CBC WITH AUTO DIFFERENTIAL - Abnormal; Notable for the following components:    RBC 2.72 (*)     Hemoglobin 7.4 (*)     Hematocrit 23.0 (*)     MCHC 32.3 (*)     RDW 16.0 (*)     Neutrophils Absolute 7.1 (*)     All other components within normal limits   TROPONIN - Abnormal; Notable for the following components:    Troponin 0.047 (*)     All other components within normal limits    Narrative:     Beverly Barnard  LC4W tel. [phone],  TROP results called to and read back by [facility] RN, 05/11/2022  00:29, by Faby 12 - Abnormal; Notable for the following components:    Hemoglobin 7.3 (*)     Hematocrit 23.0 (*)     All other components within normal limits   HEMOGLOBIN AND HEMATOCRIT - Abnormal; Notable for the following components:    Hemoglobin 8.9 (*)     Hematocrit 27.8 (*)     All other components within normal limits   BASIC METABOLIC PANEL - Abnormal; Notable for the following components:    Glucose 186 (*)     BUN 28 (*)     CREATININE 2.04 (*)     GFR Non- 23.6 (*)     GFR  28.5 (*)     All other components within normal limits   CBC WITH AUTO DIFFERENTIAL - Abnormal; Notable for the following components:    RBC 2.75 (*)     Hemoglobin 7.5 (*)     Hematocrit 23.3 (*)     MCHC 32.2 (*)     RDW 16.3 (*)     Lymphocytes Absolute 0.9 (*)     All other components within normal limits   POCT GLUCOSE - Abnormal; Notable for the following components:    POC Glucose 200 (*)     All other components within normal limits   POCT GLUCOSE - Abnormal; Notable for the following components:    POC Glucose 257 (*)     All other components within normal limits   POCT GLUCOSE - Abnormal; Notable for the following components:    POC Glucose 244 (*)     All other components within normal limits   POCT GLUCOSE - Abnormal; Notable for the following components:    POC Glucose 178 (*)     All other components within normal limits   COVID-19, RAPID   BRAIN NATRIURETIC PEPTIDE   APTT   PROCALCITONIN   FERRITIN   TROPONIN   HEMOGLOBIN AND HEMATOCRIT   TROPONIN   POCT GLUCOSE   POCT GLUCOSE   POCT GLUCOSE   TYPE AND SCREEN   PREPARE RBC (CROSSMATCH)   PREPARE RBC (CROSSMATCH)       All other labs were within normal range or not returned as of this dictation.     EMERGENCY DEPARTMENT COURSE and DIFFERENTIAL DIAGNOSIS/MDM:   Vitals:    Vitals:    05/11/22 1459 05/11/22 1500 05/11/22 1634 05/11/22 1728   BP: 135/75 135/75  (!) 160/52   Pulse: 83 84 83 82   Resp: 18 18  18   Temp: 98.1 °F (36.7 °C) 98.1 °F (36.7 °C)  99.3 °F (37.4 °C)   TempSrc: Oral Oral  Oral   SpO2: 100%   100%   Weight:       Height:              MDM  Number of Diagnoses or Management Options  Diagnosis management comments: Patient presented ED with complaint of shortness of breath, she states been ongoing over the last 4 to 5 days, she states with ambulation of short distances she gets severely short of breath and has stopped catch her breath she states she can only ambulate about 15 feet at a time. She denies any chest pain. Chest x-ray shows no acute pulmonary process, she does have a past history of iron deficiency anemia and was receiving iron transfusions up until just after the first of the year. She states the insurance companies refused to continue to pay for maintenance infusions, and told her she could only be infused when not necessary. She appears in no acute distress while she is at rest, hemoglobin 6.3 on arrival, down from her previous of 12. At this time transfusion of blood was ordered for this patient, she will be admitted to the hospital under [facility] hospitalist for further evaluation management of anemia, redness of breath with exertion. Dr. Jose Luis Finley the [facility] hospitalist accept admission this patient. Amount and/or Complexity of Data Reviewed  Decide to obtain previous medical records or to obtain history from someone other than the patient: yes        CRITICAL CARE TIME   Total Critical Care time was 0 minutes, excluding separately reportableprocedures. There was a high probability of clinicallysignificant/life threatening deterioration in the patient's condition which required my urgent intervention. CONSULTS:  IP CONSULT TO GI  IP CONSULT TO NEUROLOGY  IP CONSULT TO PALLIATIVE CARE    PROCEDURES:  Unless otherwise noted below, none     Procedures    FINAL IMPRESSION      1. Anemia, unspecified type    2.  Dyspnea on exertion          DISPOSITION/PLAN   DISPOSITION Admitted 05/10/2022 02:37:37 PM      PATIENT REFERRED TO:  Greg Lagunas MD  [address] [address]  [phone]    Schedule an appointment as soon as possible for a visit      Edil Marilyn MD [address] [facility] [address]  [place] [address]  [phone]    Schedule an appointment as soon as possible for a visit      JOHNATHON Zuluaga CNP  [address] [address]  [phone]    On 5/17/2022  1pm.  Home visit. with Hallie Santana CNP.        DISCHARGE MEDICATIONS:  Discharge Medication List as of 5/11/2022  5:58 PM      START taking these medications    Details   ferrous sulfate (IRON 325) 325 (65 Fe) MG tablet Take 1 tablet by mouth every other day, Disp-30 tablet, R-3Normal                (Please note that portions of this note were completed with a voice recognition program.  Efforts were made to edit the dictations but occasionally words are mis-transcribed.)    Caio Jaime PA-C (electronically signed)  Attending Emergency Physician         Caio Jaime PA-C  05/10/22 [address] [facility] Minh Davis PA-C  05/14/22 [facility] Minh Davis PA-C  [facility]22 [facility]  05/24/22 5636

## 2022-05-10 NOTE — ED NOTES
Labs and nasal swab obtained by this RN, labeled and sent to lab via tube system.       Rodney Groves RN  05/10/22 6297

## 2022-05-10 NOTE — ED TRIAGE NOTES
Pt to the ED via [facility] [address] into triage with c/o SOB and exertional SOB that is worsening over the last week or so. Pt family with and states that if she walks around the house she get SOB. Pt has hx of COPD and using her normal BT at home.

## 2022-05-10 NOTE — CARE COORDINATION
[facility]   Initial Discharge Assessment    Met with patient and family at bedside to discuss discharge plan. PCP: Eliezer Gomes MD                                  Date of Last Visit: 4/12/22  [facility] Patient: No         If no PCP, list provided? N/A    Discharge Planning    Living Arrangements: Patient lives independently at home. Who do you live with? Spouse and daughter. Who helps you with your care: Patient is independent at baseline. If lives at home: Do you have any barriers navigating in your home? No    Patient can perform ADL? Yes    Current Services (outpatient and in home): [facility]. Dialysis: No    Is transportation available to get to your appointments? Yes - dtr or spouse. DME Equipment: Marcy Jayme, walker, wheelchair, scooter, stair-lift. Respiratory equipment: Nebulizer    Respiratory provider: BELLO     Pharmacy: [facility]. Consult with Medication Assistance Program?  No      Patient agreeable to [facility] Mora? C if ordered. Has [facility] PCP. Patient agreeable to SNF/Rehab? Declined    Other discharge needs identified? Other - TBD following work-up. Does Patient Have a High-Risk for Readmission Diagnosis (CHF, PN, MI, COPD)? Yes    Initial Discharge Plan? (Note: please see concurrent daily documentation for any updates after initial note). Home with spouse, [facility] Mora if ordered.     Readmission Risk              Risk of Unplanned Readmission:  0         Electronically signed by Edu Moreno RN on 5/10/2022 at 3:42 PM

## 2022-05-10 NOTE — ED NOTES
Delmer CONTRERAS at the bedside at this time. Assessment completed.       Cameron Rock RN  05/10/22 9754

## 2022-05-11 VITALS
HEART RATE: 82 BPM | HEIGHT: 59 IN | BODY MASS INDEX: 29.43 KG/M2 | OXYGEN SATURATION: 100 % | TEMPERATURE: 99.3 F | DIASTOLIC BLOOD PRESSURE: 52 MMHG | SYSTOLIC BLOOD PRESSURE: 160 MMHG | RESPIRATION RATE: 18 BRPM | WEIGHT: 146 LBS

## 2022-05-11 LAB
ANION GAP SERPL CALCULATED.3IONS-SCNC: 11 MEQ/L (ref 9–15)
ANION GAP SERPL CALCULATED.3IONS-SCNC: 9 MEQ/L (ref 9–15)
BASOPHILS ABSOLUTE: 0.1 K/UL (ref 0–0.2)
BASOPHILS ABSOLUTE: 0.1 K/UL (ref 0–0.2)
BASOPHILS RELATIVE PERCENT: 0.6 %
BASOPHILS RELATIVE PERCENT: 0.7 %
BLOOD BANK DISPENSE STATUS: NORMAL
BLOOD BANK PRODUCT CODE: NORMAL
BPU ID: NORMAL
BUN BLDV-MCNC: 28 MG/DL (ref 8–23)
BUN BLDV-MCNC: 29 MG/DL (ref 8–23)
CALCIUM SERPL-MCNC: 8.9 MG/DL (ref 8.5–9.9)
CALCIUM SERPL-MCNC: 8.9 MG/DL (ref 8.5–9.9)
CHLORIDE BLD-SCNC: 107 MEQ/L (ref 95–107)
CHLORIDE BLD-SCNC: 110 MEQ/L (ref 95–107)
CO2: 22 MEQ/L (ref 20–31)
CO2: 24 MEQ/L (ref 20–31)
CREAT SERPL-MCNC: 2.04 MG/DL (ref 0.5–0.9)
CREAT SERPL-MCNC: 2.08 MG/DL (ref 0.5–0.9)
DESCRIPTION BLOOD BANK: NORMAL
EOSINOPHILS ABSOLUTE: 0.2 K/UL (ref 0–0.7)
EOSINOPHILS ABSOLUTE: 0.2 K/UL (ref 0–0.7)
EOSINOPHILS RELATIVE PERCENT: 2.5 %
EOSINOPHILS RELATIVE PERCENT: 2.7 %
FERRITIN: 21 NG/ML (ref 13–150)
GFR AFRICAN AMERICAN: 27.9
GFR AFRICAN AMERICAN: 28.5
GFR NON-AFRICAN AMERICAN: 23
GFR NON-AFRICAN AMERICAN: 23.6
GLUCOSE BLD-MCNC: 105 MG/DL (ref 70–99)
GLUCOSE BLD-MCNC: 178 MG/DL (ref 70–99)
GLUCOSE BLD-MCNC: 186 MG/DL (ref 70–99)
GLUCOSE BLD-MCNC: 244 MG/DL (ref 70–99)
HCT VFR BLD CALC: 23 % (ref 37–47)
HCT VFR BLD CALC: 23.3 % (ref 37–47)
HCT VFR BLD CALC: 27.8 % (ref 37–47)
HEMOGLOBIN: 7.4 G/DL (ref 12–16)
HEMOGLOBIN: 7.5 G/DL (ref 12–16)
HEMOGLOBIN: 8.9 G/DL (ref 12–16)
IRON SATURATION: 7 % (ref 20–55)
IRON: 25 UG/DL (ref 37–145)
LYMPHOCYTES ABSOLUTE: 0.9 K/UL (ref 1–4.8)
LYMPHOCYTES ABSOLUTE: 1.2 K/UL (ref 1–4.8)
LYMPHOCYTES RELATIVE PERCENT: 11.3 %
LYMPHOCYTES RELATIVE PERCENT: 13.1 %
MCH RBC QN AUTO: 27.3 PG (ref 27–31.3)
MCH RBC QN AUTO: 27.3 PG (ref 27–31.3)
MCHC RBC AUTO-ENTMCNC: 32.2 % (ref 33–37)
MCHC RBC AUTO-ENTMCNC: 32.3 % (ref 33–37)
MCV RBC AUTO: 84.7 FL (ref 82–100)
MCV RBC AUTO: 84.9 FL (ref 82–100)
MONOCYTES ABSOLUTE: 0.6 K/UL (ref 0.2–0.8)
MONOCYTES ABSOLUTE: 0.7 K/UL (ref 0.2–0.8)
MONOCYTES RELATIVE PERCENT: 7.3 %
MONOCYTES RELATIVE PERCENT: 7.8 %
NEUTROPHILS ABSOLUTE: 6.1 K/UL (ref 1.4–6.5)
NEUTROPHILS ABSOLUTE: 7.1 K/UL (ref 1.4–6.5)
NEUTROPHILS RELATIVE PERCENT: 76.5 %
NEUTROPHILS RELATIVE PERCENT: 77.5 %
PDW BLD-RTO: 16 % (ref 11.5–14.5)
PDW BLD-RTO: 16.3 % (ref 11.5–14.5)
PERFORMED ON: ABNORMAL
PERFORMED ON: ABNORMAL
PLATELET # BLD: 385 K/UL (ref 130–400)
PLATELET # BLD: 390 K/UL (ref 130–400)
POTASSIUM REFLEX MAGNESIUM: 4.1 MEQ/L (ref 3.4–4.9)
POTASSIUM SERPL-SCNC: 4.4 MEQ/L (ref 3.4–4.9)
RBC # BLD: 2.72 M/UL (ref 4.2–5.4)
RBC # BLD: 2.75 M/UL (ref 4.2–5.4)
SODIUM BLD-SCNC: 140 MEQ/L (ref 135–144)
SODIUM BLD-SCNC: 143 MEQ/L (ref 135–144)
TOTAL IRON BINDING CAPACITY: 372 UG/DL (ref 250–450)
TROPONIN: 0.05 NG/ML (ref 0–0.01)
UNSATURATED IRON BINDING CAPACITY: 347 UG/DL (ref 112–347)
WBC # BLD: 7.9 K/UL (ref 4.8–10.8)
WBC # BLD: 9.2 K/UL (ref 4.8–10.8)

## 2022-05-11 PROCEDURE — 94664 DEMO&/EVAL PT USE INHALER: CPT

## 2022-05-11 PROCEDURE — 85025 COMPLETE CBC W/AUTO DIFF WBC: CPT

## 2022-05-11 PROCEDURE — 2700000000 HC OXYGEN THERAPY PER DAY

## 2022-05-11 PROCEDURE — 2580000003 HC RX 258: Performed by: NURSE PRACTITIONER

## 2022-05-11 PROCEDURE — 6370000000 HC RX 637 (ALT 250 FOR IP): Performed by: INTERNAL MEDICINE

## 2022-05-11 PROCEDURE — 6360000002 HC RX W HCPCS: Performed by: INTERNAL MEDICINE

## 2022-05-11 PROCEDURE — 80048 BASIC METABOLIC PNL TOTAL CA: CPT

## 2022-05-11 PROCEDURE — 85018 HEMOGLOBIN: CPT

## 2022-05-11 PROCEDURE — 36591 DRAW BLOOD OFF VENOUS DEVICE: CPT

## 2022-05-11 PROCEDURE — 85014 HEMATOCRIT: CPT

## 2022-05-11 PROCEDURE — 6370000000 HC RX 637 (ALT 250 FOR IP): Performed by: NURSE PRACTITIONER

## 2022-05-11 PROCEDURE — 36430 TRANSFUSION BLD/BLD COMPNT: CPT

## 2022-05-11 PROCEDURE — 99213 OFFICE O/P EST LOW 20 MIN: CPT | Performed by: INTERNAL MEDICINE

## 2022-05-11 PROCEDURE — 99213 OFFICE O/P EST LOW 20 MIN: CPT | Performed by: NURSE PRACTITIONER

## 2022-05-11 PROCEDURE — G0378 HOSPITAL OBSERVATION PER HR: HCPCS

## 2022-05-11 RX ORDER — SODIUM CHLORIDE 9 MG/ML
INJECTION, SOLUTION INTRAVENOUS PRN
Status: DISCONTINUED | OUTPATIENT
Start: 2022-05-11 | End: 2022-05-11 | Stop reason: HOSPADM

## 2022-05-11 RX ORDER — HEPARIN SODIUM (PORCINE) LOCK FLUSH IV SOLN 100 UNIT/ML 100 UNIT/ML
500 SOLUTION INTRAVENOUS ONCE
Status: COMPLETED | OUTPATIENT
Start: 2022-05-11 | End: 2022-05-11

## 2022-05-11 RX ORDER — FERROUS SULFATE 325(65) MG
325 TABLET ORAL EVERY OTHER DAY
Qty: 30 TABLET | Refills: 3 | Status: SHIPPED | OUTPATIENT
Start: 2022-05-13

## 2022-05-11 RX ORDER — FERROUS SULFATE 325(65) MG
325 TABLET ORAL EVERY OTHER DAY
Status: DISCONTINUED | OUTPATIENT
Start: 2022-05-11 | End: 2022-05-11 | Stop reason: HOSPADM

## 2022-05-11 RX ADMIN — INSULIN LISPRO 2 UNITS: 100 INJECTION, SOLUTION INTRAVENOUS; SUBCUTANEOUS at 17:08

## 2022-05-11 RX ADMIN — INSULIN LISPRO 4 UNITS: 100 INJECTION, SOLUTION INTRAVENOUS; SUBCUTANEOUS at 13:10

## 2022-05-11 RX ADMIN — FERROUS SULFATE TAB 325 MG (65 MG ELEMENTAL FE) 325 MG: 325 (65 FE) TAB at 13:09

## 2022-05-11 RX ADMIN — AMLODIPINE BESYLATE 5 MG: 5 TABLET ORAL at 08:43

## 2022-05-11 RX ADMIN — INSULIN LISPRO 15 UNITS: 100 INJECTION, SOLUTION INTRAVENOUS; SUBCUTANEOUS at 13:13

## 2022-05-11 RX ADMIN — PANTOPRAZOLE SODIUM 40 MG: 40 TABLET, DELAYED RELEASE ORAL at 05:38

## 2022-05-11 RX ADMIN — PAROXETINE 40 MG: 10 TABLET, FILM COATED ORAL at 08:43

## 2022-05-11 RX ADMIN — Medication 500 UNITS: at 18:28

## 2022-05-11 RX ADMIN — INSULIN LISPRO 15 UNITS: 100 INJECTION, SOLUTION INTRAVENOUS; SUBCUTANEOUS at 17:08

## 2022-05-11 RX ADMIN — CARVEDILOL 25 MG: 12.5 TABLET, FILM COATED ORAL at 08:43

## 2022-05-11 RX ADMIN — SODIUM CHLORIDE, PRESERVATIVE FREE 10 ML: 5 INJECTION INTRAVENOUS at 08:51

## 2022-05-11 ASSESSMENT — ENCOUNTER SYMPTOMS
COUGH: 0
ABDOMINAL PAIN: 0
NAUSEA: 0
WHEEZING: 0
VOMITING: 0
DIARRHEA: 0
TROUBLE SWALLOWING: 0
CONSTIPATION: 0
BLOOD IN STOOL: 0
COLOR CHANGE: 0
SHORTNESS OF BREATH: 1

## 2022-05-11 NOTE — CONSULTS
Inpatient consult to GI  Consult performed by: Black Thomson MD  Consult ordered by: Monika Manriquez MD          Patient Name: Cordell Peterson Date: 5/10/2022 11:50 AM  MR #: [id number]  : 1945    Attending Physician: Monika Manriquez MD  Reason for consult: symptomatic anemia, hx GIB on eliquis    History of Presenting Illness:      Celia Paul is a 67 y.o. female on hospital day 0 with a history of COPD, on supplemental oxygen, fibromyalgia, hyperlipidemia, hypertension, osteoarthritis, diabetes, GI bleeding with AVM, CVA -on Eliquis-was previously on Plavix. Past surgical history significant for cardiac cath, permanent pacemaker, lumbar laminectomy, tonsillectomy, umbilical hernia repair, carpal tunnel, and multiple GI endoscopic procedures. Family history is negative for GI malignancies. Social history is negative for EtOH or illicit drug use, reports former nicotine use. History Obtained From:  patient, electronic medical record    GI consult for symptomatic anemia, history of GI bleed on Eliquis-admitted with symptomatic anemia, presented to the ED with increased shortness of breath over 4 to 5 days. On arrival was found to have microcytic anemia, hemoglobin 6.3-in the context of Eliquis, baseline hemoglobin from 1 month ago was 12. Known history of GI bleeding with extensive GI work-up notable for scattered AVMs throughout her GI tract. She denies any overt bleeding, no abdominal pain, nausea or vomiting, hematemesis, melena, or hematochezia.   History:      Past Medical History:   Diagnosis Date    Adenomatous polyp of sigmoid colon     Adenomatous polyp of transverse colon     Anxiety     Asthma     dx 2019 / has smoked since age 11    [facility]     Cardiopulmonary arrest ([facility]     CHF (congestive heart failure) (HCC)     Chronic back pain     Bilateral L5 S1 Radic on emg--surprisingly worse on the left than the right--pt's symptoms and her MRI show worse on the right    Chronic obstructive pulmonary disease with acute exacerbation ([facility] 10/12/2019    Depression     ESBL E. coli carrier     Carrier.  Fibromyalgia     Gastrointestinal hemorrhage 2/24/2020    Hyperlipidemia     meds > 8 yrs    Hypertension     meds > 45 yrs    Insomnia 12/4/2013    On home O2     2l per n/c at bedtime mostly,     Osteoarthritis     Seizures ([facility]     Sepsis ([facility] 10/6/2020    Smoker 6/18/2013    Type II diabetes mellitus, uncontrolled ([facility]     hx > 8 yrs    Unspecified sleep apnea      Past Surgical History:   Procedure Laterality Date    BACK SURGERY  2017    lumbar disc    CARDIAC CATHETERIZATION  11/03/2014    DR. MIRELES / no stents    COLONOSCOPY  08/29/2016    w/polypectomy     COLONOSCOPY N/A 09/29/2020    COLONOSCOPY WITH POLYPECTOMY performed by Marleni Hagen MD at [facility] N/A 10/07/2019    EUA HYSTEROSCOPY DILATATION AND CURETTAGE performed by Livia Gaspar DO at [facility]. [address], COLON, DIAGNOSTIC      EYE SURGERY      [place] with IOL OU / [address]    umbilical hernia repair    IR PORT PLACEMENT EQUAL OR GREATER THAN 5 YEARS  5/14/2021    IR PORT PLACEMENT EQUAL OR GREATER THAN 5 YEARS 5/14/2021 [facility] SPECIAL PROCEDURE    MA ESOPHAGOGASTRODUODENOSCOPY TRANSORAL DIAGNOSTIC N/A 03/24/2017    EGD ESOPHAGOGASTRODUODENOSCOPY performed by Sebastián Alaniz MD at [place] [address] 02/08/2018    negative findings    MA REVISE MEDIAN N/CARPAL TUNNEL SURG Left 06/05/2017    LEFT  CARPAL TUNNEL RELEASE performed by Daylin Brandt MD at [facility],3+K/E,OXUXM N/A 02/08/2018    TONSILLECTOMY      as child    TUNNELED VENOUS PORT PLACEMENT Right 05/14/2021    8 Fr [employer] Port ClearVUE by Dr. Wen Casey  08/26/2016    w/bx     UPPER GASTROINTESTINAL ENDOSCOPY N/A 02/25/2020 EGD possible biopsy performed by Abdi Wallace MD at [address] [place] [address] N/A 2020    EGD PUSH ENTEROSCOPY performed by Black Thomson MD at [facility]     Family History  Family History   Problem Relation Age of Onset    Heart Disease Father         cardiac bypass    Arthritis Father     Arthritis Mother     Other Mother          at age 80    Other [facility]         [facility]    No Known Problems Daughter     Stroke Son      [] Unable to obtain due to ventilated and/ or neurologic status  Social History     Socioeconomic History    Marital status:      Spouse name: Claudia Reason Number of children: 2    Years of education: 15    Highest education level: High school graduate   Occupational History    Occupation: Retired-   Tobacco Use    Smoking status: Former Smoker     Packs/day: 1.00     Years: 59.00     Pack years: 59.00     Types: Cigarettes     Start date: 2017     Quit date: 10/1/2020     Years since quittin.6    Smokeless tobacco: Never Used   Vaping Use    Vaping Use: Never used   Substance and Sexual Activity    Alcohol use: Not Currently    Drug use: No    Sexual activity: Yes     Partners: Male   Other Topics Concern    Not on file   Social History Narrative    Grew up in [address] [place] [address] With:  Librado Spouse    Type of Home: House    Home Layout: Two level, Performs ADL's on one level    Home Access: Stairs to enter with rails    Entrance Stairs - Number of Steps: 4    Bathroom Shower/Tub: Tub/Shower unit, Doors    Bathroom Equipment: Shower chair, Grab bars in [place]: Rolling walker, Cane, Oxygen(uses her O2 very seldom)    ADL Assistance: Needs assistance    Homemaking Assistance: Needs assistance    Homemaking Responsibilities: No(spouse performs)    Ambulation Assistance: Independent    Transfer Assistance: Independent    Active : No    Occupation: Retired    Type of occupation: worked in [employer]: patient enjoys televsision     Social Determinants of Health     Financial Resource Strain: Low Risk     Difficulty of Paying Living Expenses: Not hard at all   Food Insecurity: No Food Insecurity    Worried About Running Out of Food in the Last Year: Never true    [address] in the Last Year: Never true   Transportation Needs:     Lack of Transportation (Medical): Not on file    Lack of Transportation (Non-Medical):  Not on file   Physical Activity:     Days of Exercise per Week: Not on file    Minutes of Exercise per Session: Not on file   Stress:     Feeling of Stress : Not on file   Social Connections:     Frequency of Communication with Friends and Family: Not on file    Frequency of Social Gatherings with Friends and Family: Not on file    Attends Jewish Services: Not on file    Active Member of [address] [place] [address] [employer] or Organizations: Not on file    Attends Club or Organization Meetings: Not on file    Marital Status: Not on file   Intimate Partner Violence:     Fear of Current or Ex-Partner: Not on file    Emotionally Abused: Not on file    Physically Abused: Not on file    Sexually Abused: Not on file   Housing Stability:     Unable to Pay for Housing in the Last Year: Not on file    Number of [place] in the Last Year: Not on file    Unstable Housing in the Last Year: Not on file      [] Unable to obtain due to ventilated and/ or neurologic status    Home Medications:      Medications Prior to Admission: insulin glargine (LANTUS) 100 UNIT/ML injection vial, INJECT 65 UNITS SUBCUTANEOUSLY AT BEDTIME.  E11.69  apixaban (ELIQUIS) 2.5 MG TABS tablet, Take 1 tablet by mouth 2 times daily  carvedilol (COREG) 25 MG tablet, TAKE 1 & 1/2 (ONE & ONE-HALF) TABLETS BY MOUTH TWICE DAILY WITH MEALS  PARoxetine (PAXIL) 40 MG tablet, TAKE 1 TABLET BY MOUTH ONCE DAILY IN THE MORNING (Patient taking differently: at bedtime TAKE 1 TABLET BY MOUTH ONCE DAILY IN THE EVENING)  insulin lispro (HUMALOG) 100 UNIT/ML injection vial, INJECT 15 UNITS SUBCUTANEOUSLY WITH EACH MEAL (Patient taking differently: INJECT 26 UNITS SUBCUTANEOUSLY WITH EACH MEAL)  ACCU-CHEK PHYLLIS PLUS strip, Test 3x daily Dx E11.65  nitrofurantoin (MACRODANTIN) 50 MG capsule, Take 1 capsule by mouth nightly  amLODIPine (NORVASC) 5 MG tablet, Take 1 tablet by mouth daily  glucose 4 g chewable tablet, Take 4 tablets by mouth as needed for Low blood sugar  Glucagon (GVOKE HYPOPEN 2-PACK) 1 MG/0.2ML SOAJ, Inject 1 mg into the skin every 15 minutes as needed (glucose less tahtn 70 or if symptomatic)  Continuous Blood Gluc Sensor (DEXCOM G6 SENSOR) MISC, 1 Device by Does not apply route continuous  Continuous Blood Gluc Transmit (DEXCOM G6 TRANSMITTER) MISC, 1 Device by Does not apply route continuous  Continuous Blood Gluc  (DEXCOM G6 ) [place], 1 Device by Does not apply route 4 times daily (before meals and nightly)  digoxin (LANOXIN) 125 MCG tablet, Take 1 tablet by mouth every other day  albuterol-ipratropium (COMBIVENT RESPIMAT)  MCG/ACT AERS inhaler, Inhale 1 puff into the lungs every 6 hours as needed for Wheezing or Shortness of Breath  ipratropium-albuterol (DUONEB) 0.5-2.5 (3) MG/3ML SOLN nebulizer solution, Inhale 3 mLs into the lungs 3 times daily  Cranberry 500 MG CAPS, Take 500 mg by mouth daily  pantoprazole (PROTONIX) 40 MG tablet, Take 1 tablet by mouth every morning (before breakfast)  Cholecalciferol (VITAMIN D3) 50 MCG (2000 UT) CAPS, Take 1,000 Units by mouth 2 times daily   lidocaine-prilocaine (EMLA) 2.5-2.5 % cream, APPLY CREAM TOPICALLY TO PORT SITE ONE HOUR PRIOR TO APPOINTMENT AS NEEDED  Handicap Placard MISC, by Does not apply route Good from 7/15/21 - 7/15/26  rosuvastatin (CRESTOR) 40 MG tablet, TAKE 1 TABLET BY MOUTH ONCE DAILY IN THE EVENING  Accu-Chek Softclix Lancets MISC, bid  Blood Glucose Monitoring Suppl (ACCU-CHEK PHYLLIS CONNECT) w/Device KIT, 1 kit by Does not apply route daily    Current Hospital Medications:   Scheduled Meds:   ferrous sulfate  325 mg Oral Every Other Day    sodium chloride flush  5-40 mL IntraVENous 2 times per day    insulin lispro  0-12 Units SubCUTAneous TID WC    insulin lispro  0-6 Units SubCUTAneous Nightly    rosuvastatin  10 mg Oral Nightly    PARoxetine  40 mg Oral Daily    pantoprazole  40 mg Oral QAM AC    amLODIPine  5 mg Oral Daily    carvedilol  25 mg Oral BID WC    Cranberry  500 mg Oral Daily    [START ON 5/12/2022] digoxin  125 mcg Oral Every Other Day    insulin glargine  65 Units SubCUTAneous Nightly    insulin lispro  15 Units SubCUTAneous TID      Continuous Infusions:   sodium chloride      sodium chloride      sodium chloride      dextrose       PRN Meds:.sodium chloride, sodium chloride, sodium chloride flush, sodium chloride, polyethylene glycol, acetaminophen **OR** acetaminophen, glucose, dextrose bolus **OR** dextrose bolus, glucagon (rDNA), dextrose, albuterol-ipratropium, ipratropium-albuterol   sodium chloride      sodium chloride      sodium chloride      dextrose        Allergies: Allergies   Allergen Reactions    Ibuprofen Nausea Only    Metformin And Related      Diarrhea      Darvon [Propoxyphene Hcl] Nausea And Vomiting      Review of Systems:       [x] CV, Resp, Neuro, , and all other systems reviewed and negative other than listed in HPI.         Objective Findings:     Vitals:   Vitals:    05/10/22 1805 05/10/22 2011 05/10/22 2114 05/11/22 0801   BP:  (!) 168/57 (!) 157/89 (!) 145/60   Pulse: 83 87 91 84   Resp:    18   Temp: 99 °F (37.2 °C) 98.6 °F (37 °C) 99.3 °F (37.4 °C) 98.4 °F (36.9 °C)   TempSrc:       SpO2: 100% 99% 100% 100%   Weight:       Height:            Physical Examination:  General: alert  HEENT: Normocephalic, no scleral icterus. Neck: No JVD. Heart: Regular, no murmur, no rub/gallop. No RV heave. Lungs: Clear to ascultation, no rales/wheezing/rhonchi.  Good chest wall excursion. Abdomen: Appearance:, no Distension , Soft, no tenderness , Bowel sounds normal  Extremities: No clubbing/cyanosis, no edema. Skin: Warm, dry, normal turgor, no rash, no bruise, no petichiae. Neuro: No myoclonus or tremor. Psych: Normal affect    Results/ Medications reviewed 5/11/2022, 11:12 AM     Laboratory, Microbiology, Pathology, Radiology, Cardiology, Medications and Transcriptions reviewed  Scheduled Meds:   ferrous sulfate  325 mg Oral Every Other Day    sodium chloride flush  5-40 mL IntraVENous 2 times per day    insulin lispro  0-12 Units SubCUTAneous TID WC    insulin lispro  0-6 Units SubCUTAneous Nightly    rosuvastatin  10 mg Oral Nightly    PARoxetine  40 mg Oral Daily    pantoprazole  40 mg Oral QAM AC    amLODIPine  5 mg Oral Daily    carvedilol  25 mg Oral BID WC    Cranberry  500 mg Oral Daily    [START ON 5/12/2022] digoxin  125 mcg Oral Every Other Day    insulin glargine  65 Units SubCUTAneous Nightly    insulin lispro  15 Units SubCUTAneous TID WC     Continuous Infusions:   sodium chloride      sodium chloride      sodium chloride      dextrose         Recent Labs     05/10/22  1251 05/10/22  2343 05/11/22  0600   WBC 8.0  --  9.2   HGB 6.3* 7.3* 7.4*   HCT 21.1* 23.0* 23.0*   MCV 89.5  --  84.7     --  385     Recent Labs     05/10/22  1251 05/11/22  0600    143   K 4.3 4.1    110*   CO2 18* 22   BUN 33* 29*   CREATININE 2.25* 2.08*     Recent Labs     05/10/22  1251   AST 19   ALT 14   BILITOT <0.2   ALKPHOS 101     No results for input(s): LIPASE, AMYLASE in the last 72 hours. Recent Labs     05/10/22  1251   PROT 6.3   INR 1.4     XR CHEST PORTABLE    Result Date: 5/10/2022  Exam: XR CHEST PORTABLE History:  sob Technique: AP portable view of the chest obtained. Comparison: none Chest x-ray portable Findings: There is a right single lumen chest port on the right with the tip in the region of superior vena cava.  The cardiomediastinal silhouette is within normal limits. There are no infiltrates, consolidations or effusions. . Bones of the thorax appear intact. No radiographic evidence of acute intrathoracic process. Endoscopic hx:  Has hx of extensive GI workup  Includin2020 EGD that showed multiple nonbleeding duodenal AVMs and antral erosions,   20 Push enteroscopy-normal   20 Colonoscopy  notable for diverticulosis and sigmoid polyps   [facility] [place] notable for multiple scattered AVMs in the distal duodenum, proximal jejunum and ileum. Impression:   68 y/o female admitted with symptomatic anemia, presented to the ED with increased shortness of breath over 4 to 5 days. On arrival was found to have microcytic anemia, hemoglobin 6.3-in the context of Eliquis, baseline hemoglobin from 1 month ago was 12. Known history of GI bleeding with extensive GI work-up notable for scattered AVMs throughout her GI tract. Pt was previously on Plavix for CVA 1 mth ago, however given her of GIB bleeding, plavix was stopped and Eliquis was started. She denies any overt bleeding. Family was bedside during interview, patient has palliative care in place, she has decided to discontinue her Eliquis, and she does not want any further endoscopic investigation. Plan:   -Continue course of care  -Continue PPI  -GI will sign off, please call if needed. Comments: Thank you for allowing us to participate in the care of this patient. Will sign off  Please call if questions or concerns arise. Electronically signed by Nate Le MD on 2022 at 11:12 AM    Please note this report has been partially produced using speech recognition software and may cause contain errors related to that system including grammar, punctuation and spelling as well as words and phrases that may seem inappropriate. If there are questions or concerns please feel free to contact me to clarify.

## 2022-05-11 NOTE — DISCHARGE SUMMARY
PORTABLE    Result Date: 5/10/2022  Exam: XR CHEST PORTABLE History:  sob Technique: AP portable view of the chest obtained. Comparison: none Chest x-ray portable Findings: There is a right single lumen chest port on the right with the tip in the region of superior vena cava. The cardiomediastinal silhouette is within normal limits. There are no infiltrates, consolidations or effusions. . Bones of the thorax appear intact. No radiographic evidence of acute intrathoracic process. Discharge Medications:         Medication List      START taking these medications    ferrous sulfate 325 (65 Fe) MG tablet  Commonly known as: IRON 325  Take 1 tablet by mouth every other day  Start taking on: May 13, 2022        CHANGE how you take these medications    insulin lispro 100 UNIT/ML injection vial  Commonly known as: HUMALOG  INJECT 15 UNITS SUBCUTANEOUSLY WITH EACH MEAL  What changed: additional instructions     PARoxetine 40 MG tablet  Commonly known as: PAXIL  TAKE 1 TABLET BY MOUTH ONCE DAILY IN THE MORNING  What changed: See the new instructions.         CONTINUE taking these medications    Accu-Chek Andreina Connect w/Device Kit  1 kit by Does not apply route daily     Accu-Chek Andreina Plus strip  Generic drug: blood glucose test strips  Test 3x daily Dx E11.65     Accu-Chek Softclix Lancets Misc  bid     * albuterol-ipratropium  MCG/ACT Aers inhaler  Commonly known as: COMBIVENT RESPIMAT  Inhale 1 puff into the lungs every 6 hours as needed for Wheezing or Shortness of Breath     * ipratropium-albuterol 0.5-2.5 (3) MG/3ML Soln nebulizer solution  Commonly known as: DUONEB  Inhale 3 mLs into the lungs 3 times daily     amLODIPine 5 MG tablet  Commonly known as: NORVASC  Take 1 tablet by mouth daily     carvedilol 25 MG tablet  Commonly known as: COREG  TAKE 1 & 1/2 (ONE & ONE-HALF) TABLETS BY MOUTH TWICE DAILY WITH MEALS     Cranberry 500 MG Caps     Dexcom G6  [place]  1 Device by Does not apply route 4 times daily (before meals and nightly)     Dexcom G6 Sensor Misc  1 Device by Does not apply route continuous     Dexcom G6 Transmitter Misc  1 Device by Does not apply route continuous     digoxin 125 MCG tablet  Commonly known as: LANOXIN  Take 1 tablet by mouth every other day     glucose 4g chewable tablet  Take 4 tablets by mouth as needed for Low blood sugar     Gvoke HypoPen 2-Pack 1 MG/0.2ML Soaj  Generic drug: Glucagon  Inject 1 mg into the skin every 15 minutes as needed (glucose less tahtn 70 or if symptomatic)     Handicap Placard Misc  by Does not apply route Good from 7/15/21 - 7/15/26     insulin glargine 100 UNIT/ML injection vial  Commonly known as: Lantus  INJECT 65 UNITS SUBCUTANEOUSLY AT BEDTIME.  E11.69     lidocaine-prilocaine 2.5-2.5 % cream  Commonly known as: EMLA     nitrofurantoin 50 MG capsule  Commonly known as: Macrodantin  Take 1 capsule by mouth nightly     pantoprazole 40 MG tablet  Commonly known as: PROTONIX  Take 1 tablet by mouth every morning (before breakfast)     rosuvastatin 40 MG tablet  Commonly known as: CRESTOR  TAKE 1 TABLET BY MOUTH ONCE DAILY IN THE EVENING     Vitamin D3 50 MCG (2000 UT) Caps         * This list has 2 medication(s) that are the same as other medications prescribed for you. Read the directions carefully, and ask your doctor or other care provider to review them with you. STOP taking these medications    apixaban 2.5 MG Tabs tablet  Commonly known as: ELIQUIS           Where to Get Your Medications      These medications were sent to [address], [address] [place] [address]    Phone: [phone]   · ferrous sulfate 325 (65 Fe) MG tablet         Disposition:   If discharged to Home, Any [facility] needs that were indicated and/or required as been addressed and set up by Social Work.      Condition at discharge: good     Activity: activity as tolerated    Total time taken for discharging this patient: 40 minutes. Greater than 70% of time was spent focused exclusively on this patient. Time was taken to review chart, discuss plans with consultants, reconciling medications, discussing plan answering questions with patient.      Signed:  Florence Ford MD  5/11/2022, 1:25 PM  ----------------------------------------------------------------------------------------------------------------------    Tylor Padilla

## 2022-05-11 NOTE — FLOWSHEET NOTE
Hgb 6.3, pt received 1 u PRBC's, Hgb recheck 7.3. Pt in bed resting, denies pain. Admission finished. Pt denied SOB. Fall protocol in place, call light within reach. Trop 0.045 redraw 0.047, Dr. Kern Come made aware.

## 2022-05-11 NOTE — PLAN OF CARE
Spoke with spouse at the bedside. Patient verbalizing wanting her code status changed to [facility], spouse agreeable. Updated spouse on risks of going home without identifying cause of anemia. Patient reports she can \"feel\" when she is having a GIB, and does not feel like this is a GIB. Spouse states \"[place] services through [facility]\" is coming to the home tomorrow. They provide emergency services to patients. I have a follow up with patient scheduled for next week, May 17, at her home. Pt is agreeable to receiving 1 unit PRBC prior to discharge. Bedside nurse and Dr. Jose Luis Finley updated. No further questions or concerns. Palliative Care signing off. Don't hesitate to call back with any questions/concerns. See full consult note from today for further documentation.     Kole Gibson, APRN - CNP

## 2022-05-11 NOTE — CARE COORDINATION
This LSW met with patient at bedside this afternoon. Patient is anticipating discharge home today. Family will provide transport home. Patient denies needs and is very complimentary of the staff that has cared for her. IMM completed.   Electronically signed by JOSE M Harley, MILAN on 5/11/22 at 3:28 PM EDT

## 2022-05-11 NOTE — PROGRESS NOTES
CLINICAL PHARMACY NOTE: MEDS TO BEDS    Total # of Prescriptions Filled: 1   The following medications were delivered to the patient:  · Iron 325 mg Tab    Additional Documentation:

## 2022-05-11 NOTE — CONSULTS
Palliative Care Consult Note  Patient: Bill Danielson  Gender: female  YOB: 1945  Unit/Bed: V777/K239-69  CodeStatus: Full Code  Inpatient Treatment Team: Treatment Team: Attending Provider: Keshav Padilla MD; Registered Nurse: Valeri Sims RN; Respiratory Therapist (Day): Fariha Decker RCP; : Zuleika Cabrera RN; Patient Care Tech: Sumeet Zuluaga; Consulting Physician: Joseph Lowry MD; Consulting Physician: Emanuel English MD  Admit Date:  5/10/2022    Chief Complaint: Wants to go home    History of Presenting Illness:      Bill Danielson is a 67 y.o. female on hospital day 0 with a history of GIB of small bowel, anxiety, asthma, HTN, HLD, CHF, COPD, CKD 4, depression, fibromyalgia, OA, cardiac arrest, seizures, T2DM, AMY, CVA on Eliquis who presented to [facility] 5/10/22 for c/o SOB, found to be anemic (hgb 6.3). Palliative Care consulted for [address]. Pt follows with Palliative Medicine as outpatient. Per previous documentation by her palliative care provider, she has wished to be [facility]. Currently Full Code during this hospitalization. Discussed goals of care with patient. Pt is A+Ox4. Denies c/o SOB, chest pain, melena, BRBPR, hematemesis, nausea, confusion, dizziness, or lightheadedness. States she is SOB at baseline given her history of COPD and heart failure, but she had increased SOB at home. Her symptoms have resolved after blood transfusion. Explained risks and benefits of leaving the hospital without further investigation of her anemia. Pt reports she is unhappy being hospitalized, does not want any further testing involving imaging or procedures, and wants to be left alone and go home. I explained that given her current code status of [facility], her being discharged will be AMA and that insurance will likely not cover the cost of this hospitalization. Pt states that she wants her code status changed to [facility] and feels very happy and comfortable with this decision.  States her family (daughter and spouse) will arrive to the hospital in 10-15 minutes and would like me to further discuss this with them. Labs reviewed, notable for elevated BUN and creatine (at baseline), and GFR 27.9 which is also at baseline, albumin 3.6, normal LFTs and Tbili, no leukocytosis, hgb 6.3-->7.5 (post PRBC transfusion), stable platelets    Imaging: EKG with NSR. CXR negative. No further updated imaging. Review of Systems:       Review of Systems   Constitutional: Negative for activity change, appetite change, fatigue, fever and unexpected weight change. HENT: Negative for trouble swallowing. Respiratory: Positive for shortness of breath (baseline, increased prior to blood transfusion, resolved). Negative for cough and wheezing. Cardiovascular: Negative for chest pain, palpitations and leg swelling. Gastrointestinal: Negative for abdominal pain, blood in stool, constipation, diarrhea, nausea and vomiting. Genitourinary: Negative for difficulty urinating. Musculoskeletal: Negative for arthralgias, gait problem and myalgias. Skin: Negative for color change and wound. Neurological: Negative for dizziness, seizures, syncope, speech difficulty, weakness, light-headedness, numbness and headaches. Psychiatric/Behavioral: Negative for confusion, decreased concentration, dysphoric mood, hallucinations and sleep disturbance. The patient is not nervous/anxious. Physical Examination:       BP (!) 145/60   Pulse 84   Temp 98.4 °F (36.9 °C)   Resp 18   Ht 4' 11\" (1.499 m)   Wt 146 lb (66.2 kg)   LMP  (LMP Unknown)   SpO2 100%   BMI 29.49 kg/m²    Physical Exam  Constitutional:       General: She is not in acute distress. Appearance: Normal appearance. HENT:      Head: Normocephalic and atraumatic. Eyes:      Pupils: Pupils are equal, round, and reactive to light. Cardiovascular:      Rate and Rhythm: Normal rate and regular rhythm. Pulses: Normal pulses. Pulmonary:      Effort: Pulmonary effort is normal.   Abdominal:      General: Abdomen is flat. Bowel sounds are normal.      Palpations: Abdomen is soft. Tenderness: There is no abdominal tenderness. Musculoskeletal:      Cervical back: Normal range of motion. Right lower leg: No edema. Left lower leg: No edema. Skin:     General: Skin is warm and dry. Findings: No bruising. Neurological:      Mental Status: She is alert and oriented to person, place, and time. Mental status is at baseline. Motor: No weakness. Psychiatric:         Mood and Affect: Mood normal.         Behavior: Behavior normal.         Thought Content: Thought content normal.         Judgment: Judgment normal.         Allergies:       Allergies   Allergen Reactions    Ibuprofen Nausea Only    Metformin And Related      Diarrhea      Darvon [Propoxyphene Hcl] Nausea And Vomiting       Medications:      Current Facility-Administered Medications   Medication Dose Route Frequency Provider Last Rate Last Admin    0.9 % sodium chloride infusion   IntraVENous PRN Malvin Wagner MD        ferrous sulfate (IRON 325) tablet 325 mg  325 mg Oral Every Other Day Malvin Wagner MD        0.9 % sodium chloride infusion   IntraVENous PRN Hussein Davis PA-C        sodium chloride flush 0.9 % injection 5-40 mL  5-40 mL IntraVENous 2 times per day Tiffanie Yoakum, APRN - NP   10 mL at 05/11/22 0851    sodium chloride flush 0.9 % injection 5-40 mL  5-40 mL IntraVENous PRN Tiffanie Yoakum, APRN - NP        0.9 % sodium chloride infusion   IntraVENous PRN Tiffanie Yoakum, APRN - NP        polyethylene glycol (GLYCOLAX) packet 17 g  17 g Oral Daily PRN Tiffanie Yoakum, APRN - NP        acetaminophen (TYLENOL) tablet 650 mg  650 mg Oral Q6H PRN Tiffanie Yoakum, APRN - NP        Or   Monica Primitivo acetaminophen (TYLENOL) suppository 650 mg  650 mg Rectal Q6H PRN Tiffanie Yoakum, APRN - NP        insulin lispro (HUMALOG) injection vial 0-12 Units  0-12 Units SubCUTAneous TID  Des Shane, APRN - NP   4 Units at 05/10/22 1810    insulin lispro (HUMALOG) injection vial 0-6 Units  0-6 Units SubCUTAneous Nightly Des Shane, APRN - NP   3 Units at 05/10/22 2213    glucose chewable tablet 16 g  4 tablet Oral PRN Des Shane, APRN - NP        dextrose bolus 10% 125 mL  125 mL IntraVENous PRN Des Shane, APRN - NP        Or    dextrose bolus 10% 250 mL  250 mL IntraVENous PRN Des Shane, APRN - NP        glucagon (rDNA) injection 1 mg  1 mg IntraMUSCular PRN Des Shane, APRN - NP        dextrose 5 % solution  100 mL/hr IntraVENous PRN Des Shane, APRN - NP        rosuvastatin (CRESTOR) tablet 10 mg  10 mg Oral Nightly Beni MONTERROSO Sedar, DO   10 mg at 05/10/22 2213    PARoxetine (PAXIL) tablet 40 mg  40 mg Oral Daily Beni MONTERROSO Sedar, DO   40 mg at 05/11/22 0843    pantoprazole (PROTONIX) tablet 40 mg  40 mg Oral QAM  Beni MONTERROSO Sedar, DO   40 mg at 05/11/22 0538    albuterol-ipratropium (COMBIVENT RESPIMAT)  MCG/ACT inhaler 1 puff  1 puff Inhalation Q6H PRN Zaire Mariee Sedar, DO        amLODIPine (NORVASC) tablet 5 mg  5 mg Oral Daily Helen MONTERROSO Sedar, DO   5 mg at 05/11/22 0843    carvedilol (COREG) tablet 25 mg  25 mg Oral BID  Beni MONTERROSO Sedar, DO   25 mg at 05/11/22 0843    Cranberry CAPS 500 mg  500 mg Oral Daily Beni MONTERROSO Sedar, DO        [START ON 5/12/2022] digoxin (LANOXIN) tablet 125 mcg  125 mcg Oral Every Other Day Zaire Mariee Sedar, DO        insulin glargine (LANTUS) injection vial 65 Units  65 Units SubCUTAneous Nightly Zaire Mariee Sedar, DO   65 Units at 05/10/22 2213    insulin lispro (HUMALOG) injection vial 15 Units  15 Units SubCUTAneous TID  Beni Nunezar, DO        ipratropium-albuterol (DUONEB) nebulizer solution 3 mL  3 mL Inhalation Q4H PRN Hailey Laurent DO           History:       PMHx:  Past Medical History:   Diagnosis Date    Adenomatous polyp of sigmoid colon     Adenomatous polyp of transverse colon  Anxiety     Asthma     dx 2019 / has smoked since age 11    Brainstem stroke ([address]     Cardiopulmonary arrest ([address]     CHF (congestive heart failure) (HCC)     Chronic back pain     Bilateral L5 S1 Radic on emg--surprisingly worse on the left than the right--pt's symptoms and her MRI show worse on the right    Chronic obstructive pulmonary disease with acute exacerbation ([address] 10/12/2019    Depression     ESBL E. coli carrier     Carrier.  Fibromyalgia     Gastrointestinal hemorrhage 2/24/2020    Hyperlipidemia     meds > 8 yrs    Hypertension     meds > 45 yrs    Insomnia 12/4/2013    On home O2     2l per n/c at bedtime mostly,     Osteoarthritis     Seizures ([address]     Sepsis ([address] 10/6/2020    Smoker 6/18/2013    Type II diabetes mellitus, uncontrolled ([address]     hx > 8 yrs    Unspecified sleep apnea        PSHx:  Past Surgical History:   Procedure Laterality Date    BACK SURGERY  2017    lumbar disc    CARDIAC CATHETERIZATION  11/03/2014    DR. MIRELES / no stents    COLONOSCOPY  08/29/2016    w/polypectomy     COLONOSCOPY N/A 09/29/2020    COLONOSCOPY WITH POLYPECTOMY performed by Alger Soulier, MD at [facility] N/A 10/07/2019    EUA HYSTEROSCOPY DILATATION AND CURETTAGE performed by Toño Morris DO at [facility]. [address], COLON, DIAGNOSTIC      EYE SURGERY      [place] with IOL OU / [address] [place]  [address]    umbilical hernia repair    IR PORT PLACEMENT EQUAL OR GREATER THAN 5 YEARS  5/14/2021    IR PORT PLACEMENT EQUAL OR GREATER THAN 5 YEARS 5/14/2021 [facility] SPECIAL PROCEDURE    MO ESOPHAGOGASTRODUODENOSCOPY TRANSORAL DIAGNOSTIC N/A 03/24/2017    EGD ESOPHAGOGASTRODUODENOSCOPY performed by Esme Wu MD at [facility] N/A 02/08/2018    negative findings    MO REVISE MEDIAN N/CARPAL TUNNEL SURG Left 06/05/2017    LEFT  CARPAL TUNNEL RELEASE performed by Roselyn Simms MD [facility] at [facility] FI,4+P/Z,IIPOZ N/A 2018    TONSILLECTOMY      as child    TUNNELED VENOUS PORT PLACEMENT Right 2021    [employer] [address] [employer] Port ClearVUE by Dr. Jane Leyva  2016    w/bx     UPPER GASTROINTESTINAL ENDOSCOPY N/A 2020    EGD possible biopsy performed by Dustin Wilson MD at [facility] N/A 2020    EGD PUSH ENTEROSCOPY performed by Kathrin Wright MD at [address] Hx:  Social History     Socioeconomic History    Marital status:      Spouse name: Susan Sosa Number of children: 2    Years of education: 12    Highest education level: High school graduate   Occupational History    Occupation: Retired-   Tobacco Use    Smoking status: Former Smoker     Packs/day: 1.00     Years: 59.00     Pack years: 59.00     Types: Cigarettes     Start date: 2017     Quit date: 10/1/2020     Years since quittin.6    Smokeless tobacco: Never Used   Vaping Use    Vaping Use: Never used   Substance and Sexual Activity    Alcohol use: Not Currently    Drug use: No    Sexual activity: Yes     Partners: Male   Other Topics Concern    None   Social History Narrative    Grew up in [address] [place] [address] With:  Librado Spouse    Type of Home: House    Home Layout: Two level, Performs ADL's on one level    Home Access: Stairs to enter with rails    Entrance Stairs - Number of Steps: 4    Bathroom Shower/Tub: Tub/Shower unit, Doors    Bathroom Equipment: Shower chair, Grab bars in [address] [place]: Rolling walker, Cane, Oxygen(uses her O2 very seldom)    ADL Assistance: Needs assistance    Homemaking Assistance: Needs assistance    Homemaking Responsibilities: No(spouse performs)    Ambulation Assistance: Independent    Transfer Assistance: Independent    Active : No    Occupation: Retired    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 Leisure & Hobbies: patient enjoys televsision     Social Determinants of Health     Financial Resource Strain: Low Risk     Difficulty of Paying Living Expenses: Not hard at all   Food Insecurity: No Food Insecurity    Worried About Running Out of Food in the Last Year: Never true    [address] in the Last Year: Never true   Transportation Needs:     Lack of Transportation (Medical): Not on file    Lack of Transportation (Non-Medical):  Not on file   Physical Activity:     Days of Exercise per Week: Not on file    Minutes of Exercise per Session: Not on file   Stress:     Feeling of Stress : Not on file   Social Connections:     Frequency of Communication with Friends and Family: Not on file    Frequency of Social Gatherings with Friends and Family: Not on file    Attends Faith Services: Not on file    Active Member of [address] [place] [address] [employer] or Organizations: Not on file    Attends Club or Organization Meetings: Not on file    Marital Status: Not on file   Intimate Partner Violence:     Fear of Current or Ex-Partner: Not on file    Emotionally Abused: Not on file    Physically Abused: Not on file    Sexually Abused: Not on file   Housing Stability:     Unable to Pay for Housing in the Last Year: Not on file    Number of [place] in the Last Year: Not on file    Unstable Housing in the Last Year: Not on file       Family Hx:  Family History   Problem Relation Age of Onset    Heart Disease Father         cardiac bypass    Arthritis Father     Arthritis Mother     Other Mother          at age 80    Other [facility]         [facility]    No Known Problems Daughter     Stroke Son        LABS: Reviewed     CBC:  Lab Results   Component Value Date    WBC 9.2 2022    RBC 2.72 2022    HGB 7.4 2022    HCT 23.0 2022    MCV 84.7 2022    MCH 27.3 2022    MCHC 32.3 2022    RDW 16.0 2022     2022    MPV 8.8 2015     CBC with Differential:   Lab Results   Component Value Date    WBC 9.2 05/11/2022    RBC 2.72 05/11/2022    HGB 7.4 05/11/2022    HCT 23.0 05/11/2022     05/11/2022    MCV 84.7 05/11/2022    MCH 27.3 05/11/2022    MCHC 32.3 05/11/2022    RDW 16.0 05/11/2022    NRBC 2 03/31/2021    BANDSPCT 1 03/31/2021    LYMPHOPCT 13.1 05/11/2022    MONOPCT 7.3 05/11/2022    BASOPCT 0.6 05/11/2022    MONOSABS 0.7 05/11/2022    LYMPHSABS 1.2 05/11/2022    EOSABS 0.2 05/11/2022    BASOSABS 0.1 05/11/2022     CMP:    Lab Results   Component Value Date     05/11/2022    K 4.1 05/11/2022     05/11/2022    CO2 22 05/11/2022    BUN 29 05/11/2022    CREATININE 2.08 05/11/2022    GFRAA 27.9 05/11/2022    LABGLOM 23.0 05/11/2022    GLUCOSE 105 05/11/2022    PROT 6.3 05/10/2022    LABALBU 3.6 05/10/2022    CALCIUM 8.9 05/11/2022    BILITOT <0.2 05/10/2022    ALKPHOS 101 05/10/2022    AST 19 05/10/2022    ALT 14 05/10/2022     BMP:    Lab Results   Component Value Date     05/11/2022    K 4.1 05/11/2022     05/11/2022    CO2 22 05/11/2022    BUN 29 05/11/2022    LABALBU 3.6 05/10/2022    CREATININE 2.08 05/11/2022    CALCIUM 8.9 05/11/2022    GFRAA 27.9 05/11/2022    LABGLOM 23.0 05/11/2022    GLUCOSE 105 05/11/2022     TSH:   Lab Results   Component Value Date    TSH 0.474 11/30/2020     Vitamin B12 and Folate: No components found for: FOLIC,  R60  Urinalysis:   Lab Results   Component Value Date    NITRU Negative 01/14/2022    WBCUA 10-20 01/14/2022    BACTERIA MANY 01/14/2022    RBCUA 0-2 01/14/2022    BLOODU moderate 02/11/2022    BLOODU SMALL 01/14/2022    SPECGRAV >=1.030 02/11/2022    SPECGRAV 1.020 01/14/2022    GLUCOSEU >=1000 mg/dL 02/11/2022    GLUCOSEU >=1000 01/14/2022           FUNCTIONAL ADL´S:     Independent: [ x ] Eating, [  x ] Dressing, [  x ] Transferring, [ x  ] Toileting, [  x ] Bathing, [ x  ] Continence  Dependent   : [  ] Eating, [   ] Dressing, [   ] Transferring, [   ] Clay Vickie, [   ] Timmothy Crown, [   ] Continence  W. assistant : [  ] Eating, [   ] Dressing, [   ] Transferring, [   ] Osvaldo Skiff, [   ] Svitlana Cardenas, [   ] Continence    Radiology: Reviewed      XR CHEST PORTABLE    Result Date: 5/10/2022  Exam: XR CHEST PORTABLE History:  sob Technique: AP portable view of the chest obtained. Comparison: none Chest x-ray portable Findings: There is a right single lumen chest port on the right with the tip in the region of superior vena cava. The cardiomediastinal silhouette is within normal limits. There are no infiltrates, consolidations or effusions. . Bones of the thorax appear intact. No radiographic evidence of acute intrathoracic process. Assessment and plan:  1. Palliative Care Encounter  Explained the role of palliative care in treating patients. Pt aware, as she follows with service as outpatient. Provided emotional support and active listening. Patient understands and is agreeable to current plan. - Pt has scheduled outpatient visit with me May 17 (next week). Will provide hospice care discussion during that visit per patient request.    -Advance Care Planning  - Goals of Care discussion  Discussed goals of care with patient. Explained in extensive detail nuances between full code, DNR CCA and DNR CC. Pt requesting for code status to be changed to [facility] during this hospitalization, but would like to wait for her family to arrive to the hospital (see above HPI for in depth conversation). I will change her code status to [facility] in the meantime, which is what her code status was prior to this hospitalization. I will update and make changes through EPIC on final decisions once family discussion is completed, and will update Dr. Andrea Escudero via perfect serve. - We discussed all care options contingent on treatment response and QOL. Much active listening, presence, and emotional support were given. Patient is being treated for multiple medical conditions this admission includin.  Symptomatic anemia  2. Elevated troponin  3. Prolonged QT  4. CKD 4  5. T2DM      Will discuss with collaborating physician Dr. Yariel Maza.         Electronically signed by JOHNATHON Alfred CNP on 5/11/2022 at 11:15 AM

## 2022-05-11 NOTE — PROGRESS NOTES
[facility] Respiratory Therapy Evaluation   Current Order:  Duoneb Q4 PRN and Combivent Q6 PRN      Home Regimen: PRN per patient      Ordering Physician: Vladimir  Re-evaluation Date:  N/A     Diagnosis: Anemia      Patient Status: Stable / Unstable + Physician notified    The following MDI Criteria must be met in order to convert aerosol to MDI with spacer.  If unable to meet, MDI will be converted to aerosol:  []  Patient able to demonstrate the ability to use MDI effectively  []  Patient alert and cooperative  []  Patient able to take deep breath with 5-10 second hold  []  Medication(s) available in this delivery method   []  Peak flow greater than or equal to 200 ml/min            Current Order Substituted To  (same drug, same frequency)   Aerosol to MDI [] Albuterol Sulfate 0.083% unit dose by aerosol Albuterol Sulfate MDI 2 puffs by inhalation with spacer    [] Levalbuterol 1.25 mg unit dose by aerosol Levalbuterol MDI 2 puffs by inhalation with spacer    [] Levalbuterol 0.63 mg unit dose by aerosol Levalbuterol MDI 2 puffs by inhalation with spacer    [] Ipratropium Bromide 0.02% unit dose by aerosol Ipratropium Bromide MDI 2 puffs by inhalation with spacer    [] Duoneb (Ipratropium + Albuterol) unit dose by aerosol Ipratropium MDI + Albuterol MDI 2 puffs by inhalation w/spacer   MDI to Aerosol [] Albuterol Sulfate MDI Albuterol Sulfate 0.083% unit dose by aerosol    [] Levalbuterol MDI 2 puffs by inhalation Levalbuterol 1.25 mg unit dose by aerosol    [] Ipratropium Bromide MDI by inhalation Ipratropium Bromide 0.02% unit dose by aerosol    [] Combivent (Ipratropium + Albuterol) MDI by inhalation Duoneb (Ipratropium + Albuterol) unit dose by aerosol       Treatment Assessment [Frequency/Schedule]:  Change frequency to: ___________No change_______________________________________per Protocol, P&T, MEC      Points 0 1 2 3 4   Pulmonary Status  Non-Smoker  []   Smoking history   < 20 pack years  []   Smoking history  ?  20 pack years  []   Pulmonary Disorder  (acute or chronic)  [x]   Severe or Chronic w/ Exacerbation  []     Surgical Status No [x]   Surgeries     General []   Surgery Lower []   Abdominal Thoracic or []   Upper Abdominal Thoracic with  PulmonaryDisorder  []     Chest X-ray Clear/Not  Ordered     [x]  Chronic Changes  Results Pending  []  Infiltrates, atelectasis, pleural effusion, or edema  []  Infiltrates in more than one lobe []  Infiltrate + Atelectasis, &/or pleural effusion  []    Respiratory Pattern Regular,  RR = 12-20 [x]  Increased,  RR = 21-25 []  MENDEZ, irregular,  or RR = 26-30 []  Decreased FEV1  or RR = 31-35 []  Severe SOB, use  of accessory muscles, or RR ? 35  []    Mental Status Alert, oriented,  Cooperative [x]  Confused but Follows commands []  Lethargic or unable to follow commands []  Obtunded  []  Comatose  []    Breath Sounds Clear to  auscultation  []  Decreased unilaterally or  in bases only [x]  Decreased  bilaterally  []  Crackles or intermittent wheezes []  Wheezes []    Cough Strong, Spontan., & nonproductive [x]  Strong,  spontaneous, &  productive []  Weak,  Nonproductive []  Weak, productive or  with wheezes []  No spontaneous  cough or may require suctioning []    Level of Activity Ambulatory []  Ambulatory w/ Assist  [x]  Non-ambulatory []  Paraplegic []  Quadriplegic []    Total    Score:___5____     Triage Score:____5____      Tri       Triage:     1. (>20) Freq: Q3    2. (16-20) Freq: Q4   3. (11-15) Freq: QID & Albuterol Q2 PRN    4. (6-10) Freq: TID & Albuterol Q2 PRN    5. (0-5) Freq Q4prn

## 2022-05-17 ENCOUNTER — OFFICE VISIT (OUTPATIENT)
Dept: PALLATIVE CARE | Age: 77
End: 2022-05-17
Payer: MEDICARE

## 2022-05-17 DIAGNOSIS — G47.9 SLEEP DISTURBANCE: ICD-10-CM

## 2022-05-17 DIAGNOSIS — D64.9 ANEMIA, UNSPECIFIED TYPE: Primary | ICD-10-CM

## 2022-05-17 DIAGNOSIS — R45.86 EMOTIONAL LABILITY: ICD-10-CM

## 2022-05-17 DIAGNOSIS — I50.42 CHRONIC COMBINED SYSTOLIC AND DIASTOLIC CHF (CONGESTIVE HEART FAILURE) (HCC): ICD-10-CM

## 2022-05-17 DIAGNOSIS — R73.9 HYPERGLYCEMIA: ICD-10-CM

## 2022-05-17 DIAGNOSIS — E04.9 ENLARGED THYROID: ICD-10-CM

## 2022-05-17 DIAGNOSIS — R31.29 OTHER MICROSCOPIC HEMATURIA: ICD-10-CM

## 2022-05-17 DIAGNOSIS — N39.0 CHRONIC UTI: ICD-10-CM

## 2022-05-17 DIAGNOSIS — Z51.5 PALLIATIVE CARE ENCOUNTER: ICD-10-CM

## 2022-05-17 DIAGNOSIS — R39.9 UTI SYMPTOMS: ICD-10-CM

## 2022-05-17 DIAGNOSIS — N18.9 CHRONIC KIDNEY DISEASE, UNSPECIFIED CKD STAGE: ICD-10-CM

## 2022-05-17 DIAGNOSIS — J44.9 CHRONIC OBSTRUCTIVE PULMONARY DISEASE, UNSPECIFIED COPD TYPE (HCC): ICD-10-CM

## 2022-05-17 DIAGNOSIS — R53.82 CHRONIC FATIGUE: ICD-10-CM

## 2022-05-17 PROCEDURE — 99350 HOME/RES VST EST HIGH MDM 60: CPT | Performed by: NURSE PRACTITIONER

## 2022-05-17 ASSESSMENT — ENCOUNTER SYMPTOMS
VOICE CHANGE: 0
BACK PAIN: 0
ABDOMINAL DISTENTION: 0
TROUBLE SWALLOWING: 0
CONSTIPATION: 0
WHEEZING: 0
NAUSEA: 0
COUGH: 0
DIARRHEA: 0
BLOOD IN STOOL: 0

## 2022-05-17 NOTE — PROGRESS NOTES
Subjective:      Patient Id: Cliff Javed was seen at  home in [facility] for palliative medicine follow-up. She was accompanied to the appointment by: spouse, Daniela Wang, and daughter, Zoë Waddell. Chief Complaint   Patient presents with    Follow-up    Fatigue    Other     c/f UTI symptoms      HPI       Jayden Gallegos is a 67 y.o. female with a complex medical history that includes GI bleeding throughout the distal small intestine, anxiety, asthma, CHF, COPD, CKD depression, fibromyalgia, OA, cardiac arrest, seizures, DM II, and sleep apnea. Home visit is necessary in lieu of office due to significant frailty and high symptom burden from comorbid illnesses. Since prior visit, pt admitted to the hospital for symptomatic anemia. I saw her during this hospitalization and discharged her with code status change of [facility]. Pt sitting on her couch, A+Ox3. Daughter and spouse at bedside. Pt reports she is very fatigued and wants to nap constantly. Her spouse and daughter state they think she has a UTI because she develops increased fatigue when she has one. Pt denies symptoms of burning, frequency, urgency, flank pain, fevers. I also explained her fatigue could be from her recent anemia issues. Pt is not SOB, no tachycardia, no hypotension, no symptoms of pallor. Pt also with complaints of dry patches on her scalp. Has not tried anything for the dry patches. Pt remains steady with her gait. Occasional SOB on exertion. Previously discussed pulmonary referral, but patient does not want another specialist to see at this time. No leg swelling/fluid retention. No chest pain. Pt being seen by [facility] services that provide in home emergency care. They were unsure if the services provides at home services for lab draws. I called [facility] and they stated, if they feel the patient needs emergent labs, then they will call out to another lab service to come to the patient's home to draw the labs.  I updated the family on this. During her assessment last week from [facility], they report the nurse said the patient has an \"enlarged thyroid. \" Upon assessment I did not assess this. Patient reports mood has been better. She is trying to increase her activity and participate in ADLs. Has urology appt scheduled for this Friday. Will order labs and follow up with patient as indicated. Past Medical History:   Diagnosis Date    Adenomatous polyp of sigmoid colon     Adenomatous polyp of transverse colon     Anxiety     Asthma     dx 2019 / has smoked since age 11    Brainstem stroke ([address]     Cardiopulmonary arrest ([address]     [facility]     Chronic back pain     Bilateral L5 S1 Radic on emg--surprisingly worse on the left than the right--pt's symptoms and her MRI show worse on the right    Chronic obstructive pulmonary disease with acute exacerbation ([address] 10/12/2019    Depression     ESBL E. coli carrier     Carrier.  Fibromyalgia     Gastrointestinal hemorrhage 2/24/2020    Hyperlipidemia     meds > 8 yrs    Hypertension     meds > 45 yrs    Insomnia 12/4/2013    On home O2     2l per n/c at bedtime mostly,     Osteoarthritis     Seizures ([address]     Sepsis ([address] 10/6/2020    Smoker 6/18/2013    Type II diabetes mellitus, uncontrolled ([address]     hx > 8 yrs    Unspecified sleep apnea      Past Surgical History:   Procedure Laterality Date    BACK SURGERY  2017    lumbar disc    CARDIAC CATHETERIZATION  11/03/2014    DR. MIRELES / no stents    COLONOSCOPY  08/29/2016    w/polypectomy     COLONOSCOPY N/A 09/29/2020    COLONOSCOPY WITH POLYPECTOMY performed by Leticia Danielson [facility] N/A 10/07/2019    EUA HYSTEROSCOPY DILATATION AND CURETTAGE performed by Mark Nunes DO at [facility]. [address], COLON, DIAGNOSTIC      EYE SURGERY      [place] with IOL OU / [address]  2018 umbilical hernia repair    IR PORT PLACEMENT EQUAL OR GREATER THAN 5 YEARS  2021    IR PORT PLACEMENT EQUAL OR GREATER THAN 5 YEARS 2021 MLOZ SPECIAL PROCEDURE    LA ESOPHAGOGASTRODUODENOSCOPY TRANSORAL DIAGNOSTIC N/A 2017    EGD ESOPHAGOGASTRODUODENOSCOPY performed by Yaakov Pineda MD at [place] [address] 2018    negative findings    LA REVISE MEDIAN N/CARPAL TUNNEL SURG Left 2017    LEFT  CARPAL TUNNEL RELEASE performed by Toño Perez [facility] at [facility],1+Y/T,HLJGQ N/A 2018    TONSILLECTOMY      as child    TUNNELED VENOUS PORT PLACEMENT Right 2021    8 Fr [employer] Port ClearVUE by Dr. Juana Klein  2016    w/bx     UPPER GASTROINTESTINAL ENDOSCOPY N/A 2020    EGD possible biopsy performed by Dustin Wilson MD at [address] [place] [address] N/A 2020    EGD PUSH ENTEROSCOPY performed by Kathrin Wright MD at [facility] Marital status:      Spouse name: Susan Sosa Number of children: 2    Years of education: 12    Highest education level: High school graduate   Occupational History    Occupation: Retired-   Tobacco Use    Smoking status: Former Smoker     Packs/day: 1.00     Years: 59.00     Pack years: 59.00     Types: Cigarettes     Start date: 2017     Quit date: 10/1/2020     Years since quittin.6    Smokeless tobacco: Never Used   Vaping Use    Vaping Use: Never used   Substance and Sexual Activity    Alcohol use: Not Currently    Drug use: No    Sexual activity: Yes     Partners: Male   Other Topics Concern    Not on file   Social History Narrative    Grew up in [address] [place] [address] With:  Librado Spouse    Type of Home: House    Home Layout: Two level, Performs ADL's on one level    Home Access: Stairs to enter with rails Entrance Stairs - Number of Steps: 4    Bathroom Shower/Tub: Tub/Shower unit, Doors    Bathroom Equipment: Shower chair, Grab bars in shower    Home Equipment: Rolling walker, Cane, Oxygen(uses her O2 very seldom)    ADL Assistance: Needs assistance    Homemaking Assistance: Needs assistance    Homemaking Responsibilities: No(spouse performs)    Ambulation Assistance: Independent    Transfer Assistance: Independent    Active : No    Occupation: Retired    Type of occupation: worked in [email or web address]: patient enjoys [facility] [email or web address] Strain: Low Risk     Difficulty of Paying Living Expenses: Not hard at [employer] Insecurity: No Food Insecurity    Worried About [facility] in the Last Year: Never true    [id number] in the Last Year: Never true   Transportation Needs:     Lack of Transportation (Medical): Not on file    Lack of Transportation (Non-Medical):  Not on file   Physical Activity:     Days of Exercise per Week: Not on file    Minutes of Exercise per Session: Not on file   Stress:     Feeling of Stress : Not on file   Social Connections:     Frequency of Communication with Friends and Family: Not on file    Frequency of Social Gatherings with Friends and Family: Not on file    Attends [employer] Services: Not on file    Active Member of [address] [place] [address] or Organizations: Not on file    Attends Club or Organization Meetings: Not on file    Marital Status: Not on file   Intimate Partner Violence:     Fear of Current or Ex-Partner: Not on file    Emotionally Abused: Not on file    Physically Abused: Not on file    Sexually Abused: Not on file   Housing Stability:     Unable to Pay for Housing in the Last Year: Not on file    Number of [place] in the Last Year: Not on file    Unstable Housing in the Last Year: Not on file     Family History   Problem Relation Age of Onset    Heart Disease Father         cardiac bypass    Arthritis Father     Arthritis Mother     Other Mother          at age 80    Other Sister         [facility]    No Known Problems Daughter     Stroke Son      Allergies   Allergen Reactions    Ibuprofen Nausea Only    Metformin And Related      Diarrhea      Darvon [Propoxyphene Hcl] Nausea And Vomiting     Current Outpatient Medications on File Prior to Visit   Medication Sig Dispense Refill    ferrous sulfate (IRON 325) 325 (65 Fe) MG tablet Take 1 tablet by mouth every other day 30 tablet 3    insulin glargine (LANTUS) 100 UNIT/ML injection vial INJECT 65 UNITS SUBCUTANEOUSLY AT BEDTIME.  E11.69 30 mL 3    carvedilol (COREG) 25 MG tablet TAKE 1 & 1/2 (ONE & ONE-HALF) TABLETS BY MOUTH TWICE DAILY WITH MEALS 270 tablet 0    PARoxetine (PAXIL) 40 MG tablet TAKE 1 TABLET BY MOUTH ONCE DAILY IN THE MORNING (Patient taking differently: at bedtime TAKE 1 TABLET BY MOUTH ONCE DAILY IN THE EVENING) 90 tablet 1    ACCU-CHEK PHYLLIS PLUS strip Test 3x daily Dx E11.65 100 each 3    amLODIPine (NORVASC) 5 MG tablet Take 1 tablet by mouth daily 90 tablet 3    glucose 4 g chewable tablet Take 4 tablets by mouth as needed for Low blood sugar 60 tablet 3    Glucagon (GVOKE HYPOPEN 2-PACK) 1 MG/0.2ML SOAJ Inject 1 mg into the skin every 15 minutes as needed (glucose less tahtn 70 or if symptomatic) 2 each 3    Continuous Blood Gluc Sensor (DEXCOM G6 SENSOR) MISC 1 Device by Does not apply route continuous 3 each 11    Continuous Blood Gluc Transmit (DEXCOM G6 TRANSMITTER) MISC 1 Device by Does not apply route continuous 1 each 3    Continuous Blood Gluc  (DEXCOM G6 ) [place] 1 Device by Does not apply route 4 times daily (before meals and nightly) 1 each 0    digoxin (LANOXIN) 125 MCG tablet Take 1 tablet by mouth every other day 45 tablet 3    albuterol-ipratropium (COMBIVENT RESPIMAT)  MCG/ACT AERS inhaler Inhale 1 puff into the lungs every 6 hours as needed for from Last 3 Encounters:   05/20/22 139 lb (63 kg)   05/10/22 146 lb (66.2 kg)   04/28/22 150 lb (68 kg)     Physical Exam  Constitutional:       General: She is not in acute distress. HENT:      Head: Normocephalic and atraumatic. Nose: No rhinorrhea. Eyes:      General: No scleral icterus. Right eye: No discharge. Left eye: No discharge. Extraocular Movements: Extraocular movements intact. Conjunctiva/sclera: Conjunctivae normal.   Cardiovascular:      Rate and Rhythm: Normal rate and regular rhythm. Heart sounds: Murmur heard. Pulmonary:      Effort: Pulmonary effort is normal.      Breath sounds: Normal breath sounds. No wheezing or rales. Abdominal:      General: Bowel sounds are normal. There is no distension. Palpations: Abdomen is soft. Tenderness: There is no abdominal tenderness. Musculoskeletal:      Cervical back: Normal range of motion and neck supple. Right lower leg: No edema. Left lower leg: No edema. Skin:     General: Skin is warm and dry. Comments: Dry patches on scalp   Neurological:      Mental Status: She is alert and oriented to person, place, and time. Mental status is at baseline. Psychiatric:         Mood and Affect: Mood normal.         Behavior: Behavior normal.       Assessment and Plan:      1. Chronic obstructive pulmonary disease, unspecified COPD type ([address]  Stable. Continue COPD directed therapies. Call for increased SOB, cough, sputum production, excessive fatigue, fever, chills, or myalgias. Activity as tolerated. 2. Chronic combined systolic and diastolic CHF  Stable. Monitor weight daily, call if you gain 3 lbs in one day or 5 lbs. in one week or for increased edema, sob, cough, orthopnea, or chest pain. Patient follows with cardiology. 3. Hyperglycemia  4. Sleep disturbance  5. Chronic fatigue  6. Chronic anemia  Pt with history of hyperglycemia related episodes.    We discussed the need for an improved sleep schedule and to get her body to adjust to a better cycle. She has extreme fatigue this visit. Recently admitted (last week) to [facility] for symptomatic anemia. Concerned this is the issue vs UTI, as she has chronic UTIs. - Discussed how her sleep schedule and current routine effects her blood sugars. Maintain follow-up with endocrine.  - Repeat CBC and UA C&S    7. Other microscopic hematuria  8. Chronic UTI  9. UTI symptoms  10. Hx of CKD  Continue to follow with urology- appt this Friday. - UA C&S    11. C/o enlarged Thyroid  - Did not palpate enlarged thyroid during my assessment, but patient states prior provider with [facility] Cadena felt for one  - Order placed for TSH level    12. Emotional lability  Mood improved. Currently on Paxil 40 mg po daily. 13. Palliative care encounter  Discussed symptom management related to chronic disease/condition. Provided emotional support and active listening. Patient understands and is agreeable to current plan. Due to acuity, symptomatology and high-risk medication management, I advised patient to Return in about 9 weeks (around 7/19/2022) for palliative care visit. Total Time 60 minutes     Total time includes reviewing other provider notes, reviewing tests, reviewing history, medications, and allergies, counseling patient/family, performing medically appropriate evaluation and examination, and documenting in the medical record.        JOHNATHON Lyles - CNP    Collaborating physician: Dr. Christina Gallego

## 2022-05-18 ENCOUNTER — TELEPHONE (OUTPATIENT)
Dept: PALLATIVE CARE | Age: 77
End: 2022-05-18

## 2022-05-18 VITALS
SYSTOLIC BLOOD PRESSURE: 138 MMHG | TEMPERATURE: 97.6 F | HEART RATE: 75 BPM | OXYGEN SATURATION: 98 % | RESPIRATION RATE: 16 BRPM | DIASTOLIC BLOOD PRESSURE: 68 MMHG

## 2022-05-18 DIAGNOSIS — N18.9 CHRONIC KIDNEY DISEASE, UNSPECIFIED CKD STAGE: ICD-10-CM

## 2022-05-18 DIAGNOSIS — E11.65 UNCONTROLLED TYPE 2 DIABETES MELLITUS WITH HYPERGLYCEMIA (HCC): ICD-10-CM

## 2022-05-18 DIAGNOSIS — R39.9 UTI SYMPTOMS: ICD-10-CM

## 2022-05-18 DIAGNOSIS — Z79.4 TYPE 2 DIABETES MELLITUS WITH OTHER SPECIFIED COMPLICATION, WITH LONG-TERM CURRENT USE OF INSULIN (HCC): Primary | ICD-10-CM

## 2022-05-18 DIAGNOSIS — D64.9 ANEMIA, UNSPECIFIED TYPE: ICD-10-CM

## 2022-05-18 DIAGNOSIS — E11.69 TYPE 2 DIABETES MELLITUS WITH OTHER SPECIFIED COMPLICATION, WITH LONG-TERM CURRENT USE OF INSULIN (HCC): Primary | ICD-10-CM

## 2022-05-18 DIAGNOSIS — E04.9 ENLARGED THYROID: ICD-10-CM

## 2022-05-18 LAB
ALBUMIN SERPL-MCNC: 4 G/DL (ref 3.5–4.6)
ALP BLD-CCNC: 115 U/L (ref 40–130)
ALT SERPL-CCNC: 17 U/L (ref 0–33)
ANION GAP SERPL CALCULATED.3IONS-SCNC: 18 MEQ/L (ref 9–15)
AST SERPL-CCNC: 18 U/L (ref 0–35)
BACTERIA: ABNORMAL /HPF
BILIRUB SERPL-MCNC: 0.3 MG/DL (ref 0.2–0.7)
BILIRUBIN URINE: NEGATIVE
BLOOD, URINE: ABNORMAL
BUN BLDV-MCNC: 28 MG/DL (ref 8–23)
CALCIUM SERPL-MCNC: 9.6 MG/DL (ref 8.5–9.9)
CHLORIDE BLD-SCNC: 104 MEQ/L (ref 95–107)
CLARITY: ABNORMAL
CO2: 19 MEQ/L (ref 20–31)
COLOR: YELLOW
CREAT SERPL-MCNC: 1.89 MG/DL (ref 0.5–0.9)
EPITHELIAL CELLS, UA: ABNORMAL /HPF (ref 0–5)
GFR AFRICAN AMERICAN: 31.1
GFR NON-AFRICAN AMERICAN: 25.7
GLOBULIN: 2.8 G/DL (ref 2.3–3.5)
GLUCOSE BLD-MCNC: 248 MG/DL (ref 70–99)
GLUCOSE URINE: 250 MG/DL
HCT VFR BLD CALC: 33.9 % (ref 37–47)
HEMOGLOBIN: 10.8 G/DL (ref 12–16)
HYALINE CASTS: ABNORMAL /HPF (ref 0–5)
KETONES, URINE: NEGATIVE MG/DL
LEUKOCYTE ESTERASE, URINE: ABNORMAL
MCH RBC QN AUTO: 27.4 PG (ref 27–31.3)
MCHC RBC AUTO-ENTMCNC: 31.9 % (ref 33–37)
MCV RBC AUTO: 85.9 FL (ref 82–100)
NITRITE, URINE: POSITIVE
PDW BLD-RTO: 16.9 % (ref 11.5–14.5)
PH UA: 5.5 (ref 5–9)
PLATELET # BLD: 409 K/UL (ref 130–400)
POTASSIUM SERPL-SCNC: 4.4 MEQ/L (ref 3.4–4.9)
PROTEIN UA: 100 MG/DL
RBC # BLD: 3.94 M/UL (ref 4.2–5.4)
RBC UA: ABNORMAL /HPF (ref 0–5)
SODIUM BLD-SCNC: 141 MEQ/L (ref 135–144)
SPECIFIC GRAVITY UA: 1.02 (ref 1–1.03)
TOTAL PROTEIN: 6.8 G/DL (ref 6.3–8)
TSH REFLEX: 0.99 UIU/ML (ref 0.44–3.86)
URINE REFLEX TO CULTURE: YES
UROBILINOGEN, URINE: 0.2 E.U./DL
WBC # BLD: 6.9 K/UL (ref 4.8–10.8)
WBC UA: >100 /HPF (ref 0–5)

## 2022-05-18 RX ORDER — INSULIN LISPRO 100 [IU]/ML
INJECTION, SOLUTION INTRAVENOUS; SUBCUTANEOUS
Qty: 10 ML | Refills: 1 | Status: SHIPPED | OUTPATIENT
Start: 2022-05-18 | End: 2022-06-06 | Stop reason: SDUPTHER

## 2022-05-18 NOTE — TELEPHONE ENCOUNTER
Pharmacy requesting medication refill.  Please approve or deny this request.    Rx requested:  Requested Prescriptions     Pending Prescriptions Disp Refills    insulin lispro (HUMALOG) 100 UNIT/ML SOLN injection vial [Pharmacy Med Name: Insulin Lispro 100 UNIT/ML Subcutaneous Solution] 10 mL 1     Sig: INJECT 15 UNITS SUBCUTANEOUSLY THREE TIMES DAILY WITH MEALS         Last Office Visit:   1/20/2022      Next Visit Date:  Future Appointments   Date Time Provider Zaid Lr   5/20/2022 10:30 AM Jovan Dejesus MD [facility]   5/23/2022  1:15 PM Daily Aranda MD [facility]   6/9/2022  1:30 PM MD BROOKS WallaceOX [facility]   6/27/2022  3:00 PM Daily Aranda MD [facility]   7/13/2022 11:00 AM MD BROOKS WallaceOX [facility]   7/18/2022  1:00 PM JOHNATHON Lizama - CNP [facility]   7/26/2022  1:00 PM Radha Wayne MD [address] [place] [address]

## 2022-05-18 NOTE — TELEPHONE ENCOUNTER
Attempted to call home phone, no answer. Called spouse Abran Ivey. Updated him on labs. Stable hemoglobin. Notified him of +UA and that we are waiting on the culture for which antibiotic to start patient on. He reports patient will see Urologist on Friday and let him know the situation. I will be in touch with them as soon as urine culture is resulted. No further questions or concerns.   Magdalene Daugherty, APRN - CNP

## 2022-05-19 ENCOUNTER — CARE COORDINATION (OUTPATIENT)
Dept: CARE COORDINATION | Age: 77
End: 2022-05-19

## 2022-05-19 PROBLEM — F40.240 CLAUSTROPHOBIA: Status: ACTIVE | Noted: 2022-05-19

## 2022-05-19 LAB
ORGANISM: ABNORMAL
URINE CULTURE, ROUTINE: ABNORMAL
URINE CULTURE, ROUTINE: ABNORMAL

## 2022-05-19 NOTE — CARE COORDINATION
Called patient to discuss enrollment in [address] [place] [address]. I left a voice mail message introducing myself and a brief description of the Care Coordination Program.   Requested a call back to discuss the program.  Call back number provided. A CC Introduction Letter mailed to patient's home address and by My Chart message. Patient can not remember where she gets her Boost from.   Please advise

## 2022-05-19 NOTE — LETTER
5/19/2022    [address] [place] [id number]      Dear Dell Grewal,    My name is Mateo Musa RN and I am a registered nurse who partners with Ta Sneed MD to improve patients' health. Ta Sneed MD believes you would benefit from working with me. As a member of your health care team, I would work with other providers involved in your care, offer education for your specific health conditions, and connect you with additional resources as needed. I will collaborate with Ta Sneed MD to support you in following your treatment plan. The additional support I provide is no additional cost to you. My primary focus is to help you achieve specific goals and improve your health. We are committed to walk with you on this journey and look forward to working with you. Please call me to further discuss your healthcare needs. I am available by phone. You can reach me at [phone].     In good health,     Mateo Musa RN

## 2022-05-20 ENCOUNTER — OFFICE VISIT (OUTPATIENT)
Dept: UROLOGY | Age: 77
End: 2022-05-20
Payer: MEDICARE

## 2022-05-20 ENCOUNTER — TELEPHONE (OUTPATIENT)
Dept: PALLATIVE CARE | Age: 77
End: 2022-05-20

## 2022-05-20 VITALS
WEIGHT: 139 LBS | HEIGHT: 59 IN | HEART RATE: 81 BPM | BODY MASS INDEX: 28.02 KG/M2 | SYSTOLIC BLOOD PRESSURE: 120 MMHG | DIASTOLIC BLOOD PRESSURE: 62 MMHG

## 2022-05-20 DIAGNOSIS — N39.0 CHRONIC UTI: Primary | ICD-10-CM

## 2022-05-20 LAB
BILIRUBIN, POC: ABNORMAL
BLOOD URINE, POC: ABNORMAL
CLARITY, POC: ABNORMAL
COLOR, POC: YELLOW
GLUCOSE URINE, POC: 500
KETONES, POC: ABNORMAL
LEUKOCYTE EST, POC: ABNORMAL
NITRITE, POC: POSITIVE
PH, POC: 6
PROTEIN, POC: 100
SPECIFIC GRAVITY, POC: 1.02
UROBILINOGEN, POC: 0.2

## 2022-05-20 PROCEDURE — 99213 OFFICE O/P EST LOW 20 MIN: CPT | Performed by: UROLOGY

## 2022-05-20 PROCEDURE — 81003 URINALYSIS AUTO W/O SCOPE: CPT | Performed by: UROLOGY

## 2022-05-20 RX ORDER — NITROFURANTOIN MACROCRYSTALS 50 MG/1
50 CAPSULE ORAL 4 TIMES DAILY
Qty: 90 CAPSULE | Refills: 0 | Status: SHIPPED | OUTPATIENT
Start: 2022-05-20 | End: 2022-05-20

## 2022-05-20 ASSESSMENT — ENCOUNTER SYMPTOMS: SHORTNESS OF BREATH: 1

## 2022-05-20 NOTE — PROGRESS NOTES
[facility] EVALUATION NOTE                                                 H&P                                                                                                                                                 Reason for Visit  Recurrent UTIs    History of Present Illness  76-year-old female was started on nitrofurantoin 50 mg p.o. daily prescription was never filled  Urine culture 5/18/2022 shows E. coli sensitive to nitrofurantoin  Patient will be restarted on nitrofurantoin 50 mg p.o. daily       Urologic Review of Systems/Symptoms  No symptoms of UTI    Review of Systems  Hospitalization: None recent  All 14 categories of Review of Systems otherwise reviewed no other findings reported. No change in review of systems  Past Medical History:   Diagnosis Date    Adenomatous polyp of sigmoid colon     Adenomatous polyp of transverse colon     Anxiety     Asthma     dx 2019 / has smoked since age 11    Brainstem stroke ([address]     Cardiopulmonary arrest ([address]     CH[facility]     Chronic back pain     Bilateral L5 S1 Radic on emg--surprisingly worse on the left than the right--pt's symptoms and her MRI show worse on the right    Chronic obstructive pulmonary disease with acute exacerbation ([address] 10/12/2019    Depression     ESBL E. coli carrier     Carrier.  Fibromyalgia     Gastrointestinal hemorrhage 2/24/2020    Hyperlipidemia     meds > 8 yrs    Hypertension     meds > 45 yrs    Insomnia 12/4/2013    On home O2     2l per n/c at bedtime mostly,     Osteoarthritis     Seizures ([address]     Sepsis ([address] 10/6/2020    Smoker 6/18/2013    Type II diabetes mellitus, uncontrolled ([address]     hx > 8 yrs    Unspecified sleep apnea      Past Surgical History:   Procedure Laterality Date    BACK SURGERY  2017    lumbar disc    CARDIAC CATHETERIZATION  11/03/2014    DR. MIRELES / no stents    COLONOSCOPY  08/29/2016    w/polypectomy     COLONOSCOPY N/A 2020    COLONOSCOPY WITH POLYPECTOMY performed by Kellen Alvarez MD at [facility] N/A 10/07/2019    EUA HYSTEROSCOPY DILATATION AND CURETTAGE performed by Dariel Valenzuela DO at [address] [place] [address] ENDOSCOPY, COLON, DIAGNOSTIC      EYE SURGERY      [place] with IOL OU / [address]    umbilical hernia repair    IR PORT PLACEMENT EQUAL OR GREATER THAN 5 YEARS  2021    IR PORT PLACEMENT EQUAL OR GREATER THAN 5 YEARS 2021 MLOZ SPECIAL PROCEDURE    SD ESOPHAGOGASTRODUODENOSCOPY TRANSORAL DIAGNOSTIC N/A 2017    EGD ESOPHAGOGASTRODUODENOSCOPY performed by Nacho Dickinson MD at [place] [id number] 2018    negative findings    SD REVISE MEDIAN N/CARPAL TUNNEL SURG Left 2017    LEFT  CARPAL TUNNEL RELEASE performed by Alonso [facility]emp [facility] at [facility],[id number] N/A 2018    TONSILLECTOMY      as child    TUNNELED VENOUS PORT PLACEMENT Right 2021    [employer] [address] [employer] Port ClearVUE by Dr. Bryan Dee  2016    w/bx     UPPER GASTROINTESTINAL ENDOSCOPY N/A 2020    EGD possible biopsy performed by Kellen Alvarez MD at [address] [place] [address] N/A 2020    EGD PUSH ENTEROSCOPY performed by Joseph Martinez MD at [facility] Marital status:      Spouse name: Rian Jo Number of children: 2    Years of education: 12    Highest education level: High school graduate   Occupational History    Occupation: Retired-   Tobacco Use    Smoking status: Former Smoker     Packs/day: 1.00     Years: 59.00     Pack years: 59.00     Types: Cigarettes     Start date: 2017     Quit date: 10/1/2020     Years since quittin.6    Smokeless tobacco: Never Used   Vaping Use    Vaping Use: Never used   Substance and Sexual Activity    Alcohol use: Not Currently    Drug use: No    Sexual activity: Yes     Partners: Male   Other Topics Concern    None   Social History Narrative    Grew up in [place]     Lives With:  [id number] Spouse    Type of Home: House    Home Layout: Two level, Performs ADL's on one level    Home Access: Stairs to enter with rails    Entrance Stairs - Number of Steps: 4    Bathroom Shower/Tub: Tub/Shower unit, Doors    Bathroom Equipment: Shower chair, Grab bars in [facility]: Rolling walker, Cane, Oxygen(uses her O2 very seldom)    ADL Assistance: Needs assistance    Homemaking Assistance: Needs assistance    Homemaking Responsibilities: No(spouse performs)    Ambulation Assistance: Independent    Transfer Assistance: Independent    Active : No    Occupation: Retired    Type of occupation: worked in [employer]: patient enjoys [address] [place] [address] [employer] Strain: Low Risk     Difficulty of Paying Living Expenses: Not hard at [employer] Insecurity: No Food Insecurity    Worried About [facility] in the Last Year: Never true    [address] in the Last Year: Never true   Transportation Needs:     Lack of Transportation (Medical): Not on file    Lack of Transportation (Non-Medical):  Not on file   Physical Activity:     Days of Exercise per Week: Not on file    Minutes of Exercise per Session: Not on file   Stress:     Feeling of Stress : Not on file   Social Connections:     Frequency of Communication with Friends and Family: Not on file    Frequency of Social Gatherings with Friends and Family: Not on file    Attends [employer] Services: Not on file    Active Member of Clubs or Organizations: Not on file    Attends Club or Organization Meetings: Not on file    Marital Status: Not on file   Intimate Partner Violence:     Fear of Current or Ex-Partner: Not on file    Emotionally Abused: Not on file   Marielos Physically Abused: Not on file    Sexually Abused: Not on file   Housing Stability:     Unable to Pay for Housing in the Last Year: Not on file    Number of Places Lived in the Last Year: Not on file    Unstable Housing in the Last Year: Not on file     Family History   Problem Relation Age of Onset    Heart Disease Father         cardiac bypass    Arthritis Father     Arthritis Mother     Other Mother          at age 80    Other Sister         [facility]    No Known Problems Daughter     Stroke Son      Current Outpatient Medications   Medication Sig Dispense Refill    nitrofurantoin (MACRODANTIN) 50 MG capsule Take 1 capsule by mouth 4 times daily for 7 days 90 capsule 0    insulin lispro (HUMALOG) 100 UNIT/ML SOLN injection vial INJECT 15 UNITS SUBCUTANEOUSLY THREE TIMES DAILY WITH MEALS 10 mL 1    ferrous sulfate (IRON 325) 325 (65 Fe) MG tablet Take 1 tablet by mouth every other day 30 tablet 3    insulin glargine (LANTUS) 100 UNIT/ML injection vial INJECT 65 UNITS SUBCUTANEOUSLY AT BEDTIME.  E11.69 30 mL 3    carvedilol (COREG) 25 MG tablet TAKE 1 & 1/2 (ONE & ONE-HALF) TABLETS BY MOUTH TWICE DAILY WITH MEALS 270 tablet 0    PARoxetine (PAXIL) 40 MG tablet TAKE 1 TABLET BY MOUTH ONCE DAILY IN THE MORNING (Patient taking differently: at bedtime TAKE 1 TABLET BY MOUTH ONCE DAILY IN THE EVENING) 90 tablet 1    ACCU-CHEK PHYLLIS PLUS strip Test 3x daily Dx E11.65 100 each 3    amLODIPine (NORVASC) 5 MG tablet Take 1 tablet by mouth daily 90 tablet 3    glucose 4 g chewable tablet Take 4 tablets by mouth as needed for Low blood sugar 60 tablet 3    Glucagon (GVOKE HYPOPEN 2-PACK) 1 MG/0.2ML SOAJ Inject 1 mg into the skin every 15 minutes as needed (glucose less tahtn 70 or if symptomatic) 2 each 3    Continuous Blood Gluc Sensor (DEXCOM G6 SENSOR) MISC 1 Device by Does not apply route continuous 3 each 11    Continuous Blood Gluc Transmit (DEXCOM G6 TRANSMITTER) MISC 1 Device by Does not apply route continuous 1 each 3    Continuous Blood Gluc  (DEXCOM G6 ) [place] 1 Device by Does not apply route 4 times daily (before meals and nightly) 1 each 0    digoxin (LANOXIN) 125 MCG tablet Take 1 tablet by mouth every other day 45 tablet 3    albuterol-ipratropium (COMBIVENT RESPIMAT)  MCG/ACT AERS inhaler Inhale 1 puff into the lungs every 6 hours as needed for Wheezing or Shortness of Breath 4 g 5    ipratropium-albuterol (DUONEB) 0.5-2.5 (3) MG/3ML SOLN nebulizer solution Inhale 3 mLs into the lungs 3 times daily 360 mL 0    Cranberry 500 MG CAPS Take 500 mg by mouth daily      pantoprazole (PROTONIX) 40 MG tablet Take 1 tablet by mouth every morning (before breakfast) 90 tablet 3    Cholecalciferol (VITAMIN D3) 50 MCG (2000 UT) CAPS Take 1,000 Units by mouth 2 times daily       lidocaine-prilocaine (EMLA) 2.5-2.5 % cream APPLY CREAM TOPICALLY TO PORT SITE ONE HOUR PRIOR TO APPOINTMENT AS NEEDED      Handicap Placard MISC by Does not apply route Good from 7/15/21 - 7/15/26 1 each 0    rosuvastatin (CRESTOR) 40 MG tablet TAKE 1 TABLET BY MOUTH ONCE DAILY IN THE EVENING 90 tablet 3    Accu-Chek Softclix Lancets MISC bid 100 each 3    Blood Glucose Monitoring Suppl (ACCU-CHEK PHYLLIS CONNECT) w/Device KIT 1 kit by Does not apply route daily 1 kit 0     No current facility-administered medications for this visit.      Ibuprofen, Metformin and related, and Darvon [propoxyphene hcl]  All reviewed and verified by Dr Gabrielle Hendrickson on today's visit    No results found for: PSA, PSADIA  Results for POC orders placed in visit on 05/20/22   POCT Urinalysis No Micro (Auto)   Result Value Ref Range    Color, UA yellow     Clarity, UA cloudy     Glucose, UA      Bilirubin, UA neg     Ketones, UA neg     Spec Grav, UA 1.020     Blood, UA POC moderate     pH, UA 6.0     Protein, UA      Urobilinogen, UA 0.2     Leukocytes, UA moderate     Nitrite, UA positive        Physical Exam  Vitals:    05/20/22 1036   BP: 120/62   Pulse: 81   Weight: 139 lb (63 kg)   Height: 4' 11\" (1.499 m)     Constitutional: Not in distress  Physical exam otherwise unremarkable. Assessment/Medical Necessity-Decision Making  Patient will be restarted on nitrofurantoin 50 mg p.o. daily  Full year prescription given  Plan  Follow-up 3 months check urinalysis  Greater than 50% of 20 minutes spent consulting patient face-to-face  Orders Placed This Encounter   Procedures    POCT Urinalysis No Micro (Auto)     Orders Placed This Encounter   Medications    nitrofurantoin (MACRODANTIN) 50 MG capsule     Sig: Take 1 capsule by mouth 4 times daily for 7 days     Dispense:  90 capsule     Refill:  0     Albaro Mejia MD       Please note this report has been partially produced using speech recognition software  And may cause contain errors related to that system including grammar, punctuation and spelling as well as words and phrases that may seem inappropriate. If there are questions or concerns please feel free to contact me to clarify.

## 2022-05-20 NOTE — TELEPHONE ENCOUNTER
Pt with positive urine culture. Saw Dr. Malini Carroll today who prescribed Nitrofurantoin 50 mg daily. No further workup or issues.   Chevy Narvaez, APRN - CNP

## 2022-05-23 ENCOUNTER — OFFICE VISIT (OUTPATIENT)
Dept: NEUROLOGY | Age: 77
End: 2022-05-23
Payer: MEDICARE

## 2022-05-23 ENCOUNTER — CARE COORDINATION (OUTPATIENT)
Dept: CARE COORDINATION | Age: 77
End: 2022-05-23

## 2022-05-23 VITALS
HEART RATE: 82 BPM | WEIGHT: 148.9 LBS | BODY MASS INDEX: 30.07 KG/M2 | DIASTOLIC BLOOD PRESSURE: 60 MMHG | SYSTOLIC BLOOD PRESSURE: 122 MMHG

## 2022-05-23 DIAGNOSIS — R56.9 SEIZURES (HCC): ICD-10-CM

## 2022-05-23 DIAGNOSIS — I63.9 BRAINSTEM STROKE (HCC): Primary | ICD-10-CM

## 2022-05-23 DIAGNOSIS — F48.2 PSEUDOBULBAR AFFECT: ICD-10-CM

## 2022-05-23 DIAGNOSIS — R27.0 ATAXIA: ICD-10-CM

## 2022-05-23 DIAGNOSIS — H51.21 INO (INTERNUCLEAR OPHTHALMOPLEGIA), RIGHT: ICD-10-CM

## 2022-05-23 PROCEDURE — 1123F ACP DISCUSS/DSCN MKR DOCD: CPT | Performed by: PSYCHIATRY & NEUROLOGY

## 2022-05-23 PROCEDURE — 99214 OFFICE O/P EST MOD 30 MIN: CPT | Performed by: PSYCHIATRY & NEUROLOGY

## 2022-05-23 RX ORDER — DEXTROMETHORPHAN HYDROBROMIDE AND QUINIDINE SULFATE 20; 10 MG/1; MG/1
1 CAPSULE, GELATIN COATED ORAL DAILY
Qty: 30 CAPSULE | Refills: 3 | Status: SHIPPED | OUTPATIENT
Start: 2022-05-23

## 2022-05-23 ASSESSMENT — ENCOUNTER SYMPTOMS
TROUBLE SWALLOWING: 0
SHORTNESS OF BREATH: 0
CHOKING: 0
VOMITING: 0
NAUSEA: 0
PHOTOPHOBIA: 0
COLOR CHANGE: 0
BACK PAIN: 0

## 2022-05-23 NOTE — PROGRESS NOTES
Subjective:      Patient ID: Jossy Henson is a 67 y.o. female who presents today for:  Chief Complaint   Patient presents with    Follow-Up from Hospital     Pt had stroke. Pt states she has been okay since home and neither side of body was affected. In past we seen for seizures states that she has not had any recent ones.  states that she will have outburst of crying and grumbled speech. He states that this was before the stroke as well. HPI 60-year-old right-handed female with a history of stroke. Patient was seen in the hospital for ophthalmoplegia and a. History of of seizures. Patient has not any seizures since last seen. When I last seen her she was having diplopia with a concern of an EFRAIN. She had sudden onset of diplopia in the right eye for which she was examined by the eye doctors with concerns of EFRAIN. She was then referred here. Patient has multiple risk factors for vascular disease which explains this patient was not on antiplatelet agent and therefore we have initiated Plavix. She has history of GI bleed and therefore we had recommended Plavix and not aspirin. MRI was reviewed and showed a uncomplicated acute to subacute infarct in the naomi consistent with a internuclear also plegia. Prior to this patient was seen here by my NP for a hospitalization in last March for acute hypoxic respiratory failure intubation with a follow-up seizure-like activity. Patient extensive elation at that time. As well since last seen patient is having some new symptoms of uncontrolled crying and incongruent crying and elated behavior at times. These findings would be consistent with a pseudobulbar affect. Patient denies any falls. She uses a cane to walk she has mild ataxia since her stroke. Her double vision is improved. Has not any difficulty swallowing.     Past Medical History:   Diagnosis Date    Adenomatous polyp of sigmoid colon     Adenomatous polyp of transverse colon     Anxiety     Asthma     dx 2019 / has smoked since age 11    Brainstem stroke ([address]     Cardiopulmonary arrest ([address]     CHF (congestive heart failure) (HCC)     Chronic back pain     Bilateral L5 S1 Radic on emg--surprisingly worse on the left than the right--pt's symptoms and her MRI show worse on the right    Chronic obstructive pulmonary disease with acute exacerbation ([address] 10/12/2019    Depression     ESBL E. coli carrier     Carrier.  Fibromyalgia     Gastrointestinal hemorrhage 2/24/2020    Hyperlipidemia     meds > 8 yrs    Hypertension     meds > 45 yrs    Insomnia 12/4/2013    On home O2     2l per n/c at bedtime mostly,     Osteoarthritis     Seizures ([address]     Sepsis ([address] 10/6/2020    Smoker 6/18/2013    Type II diabetes mellitus, uncontrolled ([address]     hx > 8 yrs    Unspecified sleep apnea      Past Surgical History:   Procedure Laterality Date    BACK SURGERY  2017    lumbar disc    CARDIAC CATHETERIZATION  11/03/2014    DR. MIRELES / no stents    COLONOSCOPY  08/29/2016    w/polypectomy     COLONOSCOPY N/A 09/29/2020    COLONOSCOPY WITH POLYPECTOMY performed by Mesfin Dacosta MD at [facility] N/A 10/07/2019    EUA HYSTEROSCOPY DILATATION AND CURETTAGE performed by Shira Amaya DO at [facility]. [address], COLON, DIAGNOSTIC      EYE SURGERY      [place] with IOL OU / [address] [place]  [address]    umbilical hernia repair    IR PORT PLACEMENT EQUAL OR GREATER THAN 5 YEARS  5/14/2021    IR PORT PLACEMENT EQUAL OR GREATER THAN 5 YEARS 5/14/2021 [facility]ED[facility]    GA ESOPHAGOGASTRODUODENOSCOPY TRANSORAL DIAGNOSTIC N/A 03/24/2017    EGD ESOPHAGOGASTRODUODENOSCOPY performed by Melida Tucker MD at [facility] [address] 02/08/2018    negative findings    GA REVISE MEDIAN N/CARPAL TUNNEL SURG Left 06/05/2017    LEFT  CARPAL TUNNEL RELEASE performed by Kartik Slater MD at [facility] OR    REPAIR UMBILICAL XUUK,2+N/A,GBNIE N/A 2018    TONSILLECTOMY      as child    TUNNELED VENOUS PORT PLACEMENT Right 2021    8 Fr [employer] Port ClearVUE by Dr. Ardena Moritz  2016    w/bx     UPPER GASTROINTESTINAL ENDOSCOPY N/A 2020    EGD possible biopsy performed by Colleen Leonard MD at [address] N/A 2020    EGD PUSH ENTEROSCOPY performed by Lupe Bob MD at [facility] Marital status:      Spouse name: Lashanda Pimentel Number of children: 2    Years of education: 12    Highest education level: High school graduate   Occupational History    Occupation: Retired-   Tobacco Use    Smoking status: Former Smoker     Packs/day: 1.00     Years: 59.00     Pack years: 59.00     Types: Cigarettes     Start date: 2017     Quit date: 10/1/2020     Years since quittin.6    Smokeless tobacco: Never Used   Vaping Use    Vaping Use: Never used   Substance and Sexual Activity    Alcohol use: Not Currently    Drug use: No    Sexual activity: Yes     Partners: Male   Other Topics Concern    Not on file   Social History Narrative    Grew up in [address] [place] [address] With:  Lirbado Spouse    Type of Home: House    Home Layout: Two level, Performs ADL's on one level    Home Access: Stairs to enter with rails    Entrance Stairs - Number of Steps: 4    Bathroom Shower/Tub: Tub/Shower unit, Doors    Bathroom Equipment: Shower chair, Grab bars in [place]: Rolling walker, Cane, Oxygen(uses her O2 very seldom)    ADL Assistance: Needs assistance    Homemaking Assistance: Needs assistance    Homemaking Responsibilities: No(spouse performs)    Ambulation Assistance: Independent    Transfer Assistance: Independent    Active : No    Occupation: Retired    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    Leisure & Hobbies: patient enjoys televsision     Social Determinants of Health     Financial Resource Strain: Low Risk     Difficulty of Paying Living Expenses: Not hard at all   Food Insecurity: No Food Insecurity    Worried About Running Out of Food in the Last Year: Never true    [address] in the Last Year: Never true   Transportation Needs:     Lack of Transportation (Medical): Not on file    Lack of Transportation (Non-Medical):  Not on file   Physical Activity:     Days of Exercise per Week: Not on file    Minutes of Exercise per Session: Not on file   Stress:     Feeling of Stress : Not on file   Social Connections:     Frequency of Communication with Friends and Family: Not on file    Frequency of Social Gatherings with Friends and Family: Not on file    Attends Holiness Services: Not on file    Active Member of [address] [place] [address] [employer] or Organizations: Not on file    Attends Club or Organization Meetings: Not on file    Marital Status: Not on file   Intimate Partner Violence:     Fear of Current or Ex-Partner: Not on file    Emotionally Abused: Not on file    Physically Abused: Not on file    Sexually Abused: Not on file   Housing Stability:     Unable to Pay for Housing in the Last Year: Not on file    Number of [place] in the Last Year: Not on file    Unstable Housing in the Last Year: Not on file     Family History   Problem Relation Age of Onset    Heart Disease Father         cardiac bypass    Arthritis Father     Arthritis Mother     Other Mother          at age 80    Other Sister         [facility]    No Known Problems Daughter     Stroke Son      Allergies   Allergen Reactions    Ibuprofen Nausea Only    Metformin And Related      Diarrhea      Darvon [Propoxyphene Hcl] Nausea And Vomiting       Current Outpatient Medications   Medication Sig Dispense Refill    dextromethorphan-quiNIDine (NUEDEXTA) 20-10 MG CAPS per capsule Take 1 capsule by mouth daily 30 capsule 3    nitrofurantoin (MACRODANTIN) 50 MG capsule One po daily 90 capsule 3    insulin lispro (HUMALOG) 100 UNIT/ML SOLN injection vial INJECT 15 UNITS SUBCUTANEOUSLY THREE TIMES DAILY WITH MEALS 10 mL 1    ferrous sulfate (IRON 325) 325 (65 Fe) MG tablet Take 1 tablet by mouth every other day 30 tablet 3    insulin glargine (LANTUS) 100 UNIT/ML injection vial INJECT 65 UNITS SUBCUTANEOUSLY AT BEDTIME.  E11.69 30 mL 3    carvedilol (COREG) 25 MG tablet TAKE 1 & 1/2 (ONE & ONE-HALF) TABLETS BY MOUTH TWICE DAILY WITH MEALS 270 tablet 0    PARoxetine (PAXIL) 40 MG tablet TAKE 1 TABLET BY MOUTH ONCE DAILY IN THE MORNING (Patient taking differently: at bedtime TAKE 1 TABLET BY MOUTH ONCE DAILY IN THE EVENING) 90 tablet 1    ACCU-CHEK PHYLLIS PLUS strip Test 3x daily Dx E11.65 100 each 3    amLODIPine (NORVASC) 5 MG tablet Take 1 tablet by mouth daily 90 tablet 3    glucose 4 g chewable tablet Take 4 tablets by mouth as needed for Low blood sugar 60 tablet 3    Glucagon (GVOKE HYPOPEN 2-PACK) 1 MG/0.2ML SOAJ Inject 1 mg into the skin every 15 minutes as needed (glucose less tahtn 70 or if symptomatic) 2 each 3    Continuous Blood Gluc Sensor (DEXCOM G6 SENSOR) MISC 1 Device by Does not apply route continuous 3 each 11    Continuous Blood Gluc Transmit (DEXCOM G6 TRANSMITTER) MISC 1 Device by Does not apply route continuous 1 each 3    Continuous Blood Gluc  (DEXCOM G6 ) [place] 1 Device by Does not apply route 4 times daily (before meals and nightly) 1 each 0    digoxin (LANOXIN) 125 MCG tablet Take 1 tablet by mouth every other day 45 tablet 3    albuterol-ipratropium (COMBIVENT RESPIMAT)  MCG/ACT AERS inhaler Inhale 1 puff into the lungs every 6 hours as needed for Wheezing or Shortness of Breath 4 g 5    ipratropium-albuterol (DUONEB) 0.5-2.5 (3) MG/3ML SOLN nebulizer solution Inhale 3 mLs into the lungs 3 times daily 360 mL 0    Cranberry 500 MG CAPS Take 500 mg by mouth daily      pantoprazole (PROTONIX) 40 MG tablet Take 1 tablet by mouth every morning (before breakfast) 90 tablet 3    Cholecalciferol (VITAMIN D3) 50 MCG (2000 UT) CAPS Take 1,000 Units by mouth 2 times daily       lidocaine-prilocaine (EMLA) 2.5-2.5 % cream APPLY CREAM TOPICALLY TO PORT SITE ONE HOUR PRIOR TO APPOINTMENT AS NEEDED      Handicap Placard MISC by Does not apply route Good from 7/15/21 - 7/15/26 1 each 0    rosuvastatin (CRESTOR) 40 MG tablet TAKE 1 TABLET BY MOUTH ONCE DAILY IN THE EVENING 90 tablet 3    Accu-Chek Softclix Lancets MISC bid 100 each 3    Blood Glucose Monitoring Suppl (ACCU-CHEK PHYLLIS CONNECT) w/Device KIT 1 kit by Does not apply route daily 1 kit 0     No current facility-administered medications for this visit. Review of Systems   Constitutional: Negative for fever. HENT: Negative for ear pain, tinnitus and trouble swallowing. Eyes: Negative for photophobia and visual disturbance. Respiratory: Negative for choking and shortness of breath. Cardiovascular: Negative for chest pain and palpitations. Gastrointestinal: Negative for nausea and vomiting. Musculoskeletal: Negative for back pain, gait problem, joint swelling, myalgias, neck pain and neck stiffness. Skin: Negative for color change. Allergic/Immunologic: Negative for food allergies. Neurological: Negative for dizziness, tremors, seizures, syncope, facial asymmetry, speech difficulty, weakness, light-headedness, numbness and headaches. Psychiatric/Behavioral: Negative for behavioral problems, confusion, hallucinations and sleep disturbance. Objective:   /60 (Site: Left Upper Arm, Position: Sitting, Cuff Size: Medium Adult)   Pulse 82   Wt 148 lb 14.4 oz (67.5 kg)   LMP  (LMP Unknown)   BMI 30.07 kg/m²     Physical Exam  Vitals reviewed. Eyes:      Pupils: Pupils are equal, round, and reactive to light. Cardiovascular:      Rate and Rhythm: Normal rate and regular rhythm.       Heart sounds: No murmur heard. Pulmonary:      Effort: Pulmonary effort is normal.      Breath sounds: Normal breath sounds. Abdominal:      General: Bowel sounds are normal.   Musculoskeletal:         General: Normal range of motion. Cervical back: Normal range of motion. Skin:     General: Skin is warm. Neurological:      Mental Status: She is alert and oriented to person, place, and time. Cranial Nerves: No cranial nerve deficit. Sensory: No sensory deficit. Motor: No abnormal muscle tone. Coordination: Coordination normal.      Deep Tendon Reflexes: Reflexes are normal and symmetric. Babinski sign absent on the right side. Babinski sign absent on the left side. Psychiatric:         Mood and Affect: Mood normal.     Patient has a waddling gait with a wide-based gait. No double vision no conjugate gaze is notable. XR CHEST PORTABLE    Result Date: 5/10/2022  Exam: XR CHEST PORTABLE History:  sob Technique: AP portable view of the chest obtained. Comparison: none Chest x-ray portable Findings: There is a right single lumen chest port on the right with the tip in the region of superior vena cava. The cardiomediastinal silhouette is within normal limits. There are no infiltrates, consolidations or effusions. . Bones of the thorax appear intact. No radiographic evidence of acute intrathoracic process.        Lab Results   Component Value Date    WBC 6.9 05/18/2022    RBC 3.94 05/18/2022    HGB 10.8 05/18/2022    HCT 33.9 05/18/2022    MCV 85.9 05/18/2022    MCH 27.4 05/18/2022    MCHC 31.9 05/18/2022    RDW 16.9 05/18/2022     05/18/2022    MPV 8.8 08/01/2015     Lab Results   Component Value Date     05/18/2022    K 4.4 05/18/2022    K 4.1 05/11/2022     05/18/2022    CO2 19 05/18/2022    BUN 28 05/18/2022    CREATININE 1.89 05/18/2022    GFRAA 31.1 05/18/2022    LABGLOM 25.7 05/18/2022    GLUCOSE 248 05/18/2022    PROT 6.8 05/18/2022    LABALBU 4.0 05/18/2022 CALCIUM 9.6 05/18/2022    BILITOT 0.3 05/18/2022    ALKPHOS 115 05/18/2022    AST 18 05/18/2022    ALT 17 05/18/2022     Lab Results   Component Value Date    PROTIME 16.7 05/10/2022    INR 1.4 05/10/2022     Lab Results   Component Value Date    TSH 0.474 11/30/2020    QTPHVLWZ91 1255 11/30/2020    FOLATE 11.1 11/30/2020    FERRITIN 21 05/10/2022    IRON 25 05/10/2022    TIBC 372 05/10/2022     Lab Results   Component Value Date    TRIG 183 04/07/2022    HDL 33 04/07/2022    LDLCALC 36 04/07/2022     Lab Results   Component Value Date    LABAMPH Neg 07/07/2019    BARBSCNU Neg 07/07/2019    LABBENZ Neg 07/07/2019    OPIATESCREENURINE Neg 07/07/2019    PHENCYCLIDINESCREENURINE Neg 07/07/2019    ETOH <10 03/18/2021     No results found for: LITHIUM, DILFRTOT, VALPROATE    Assessment:       Diagnosis Orders   1. Brainstem stroke ([address]     2. Pseudobulbar affect     3. EFRAIN (internuclear ophthalmoplegia), right     4. Ataxia     5. 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 Patient was in the hospital for the same. She presented double vision and some ataxia. MRI was positive for a pontine stroke. Patient has recovered much and this is likely secondary to her diabetes and she has not antiplatelet agents. She had appears GI bleeding and there was not on aspirin. We therefore started on Plavix which she is tolerating. Patient is now having symptoms of pseudobulbar affect with extreme crying spells which are incongruent and occasionally elated moods which may be part of her laughing spells. Recommended we start her on Nuedexta 1 a day to see if this helps and if it does we will consider twice a day dosing. There is no contraindication to use of this and her pulse rate is 82. Patient has baseline gait ataxia secondary to the stroke as well.   Patient was also seen for hypoxic ischemic   Encephalopathy and she had subsequently had 2 seizures from the same place that is recurrent seizures and we have not continued her on medications for the same. Patient has come a long way in terms of rehabilitation as well she is using a cane to walk. Patient was per records were reviewed with the patient as she had no recall of some of the events. We will keep an eye on this and continue to follow. Plan:      No orders of the defined types were placed in this encounter. Orders Placed This Encounter   Medications    dextromethorphan-quiNIDine (NUEDEXTA) 20-10 MG CAPS per capsule     Sig: Take 1 capsule by mouth daily     Dispense:  30 capsule     Refill:  3       No follow-ups on file.       Daily Aranda MD

## 2022-05-23 NOTE — CARE COORDINATION
Received call back from patient's , Chrissy Ochoa. Introduced myself and CC program. He declined services.

## 2022-05-27 ENCOUNTER — OFFICE VISIT (OUTPATIENT)
Dept: FAMILY MEDICINE CLINIC | Age: 77
End: 2022-05-27
Payer: MEDICARE

## 2022-05-27 VITALS
HEIGHT: 59 IN | HEART RATE: 83 BPM | OXYGEN SATURATION: 100 % | DIASTOLIC BLOOD PRESSURE: 58 MMHG | SYSTOLIC BLOOD PRESSURE: 124 MMHG | TEMPERATURE: 97.6 F | BODY MASS INDEX: 30.07 KG/M2

## 2022-05-27 DIAGNOSIS — H61.23 BILATERAL IMPACTED CERUMEN: Primary | ICD-10-CM

## 2022-05-27 DIAGNOSIS — L21.9 SEBORRHEIC DERMATITIS OF SCALP: ICD-10-CM

## 2022-05-27 PROCEDURE — 1123F ACP DISCUSS/DSCN MKR DOCD: CPT | Performed by: NURSE PRACTITIONER

## 2022-05-27 PROCEDURE — 99213 OFFICE O/P EST LOW 20 MIN: CPT | Performed by: NURSE PRACTITIONER

## 2022-05-27 PROCEDURE — 69210 REMOVE IMPACTED EAR WAX UNI: CPT | Performed by: NURSE PRACTITIONER

## 2022-05-27 RX ORDER — FLUOCINONIDE TOPICAL SOLUTION USP, 0.05% 0.5 MG/ML
SOLUTION TOPICAL
Qty: 60 ML | Refills: 0 | Status: SHIPPED | OUTPATIENT
Start: 2022-05-27

## 2022-05-27 ASSESSMENT — ENCOUNTER SYMPTOMS
COUGH: 0
SORE THROAT: 0
VOMITING: 0
ABDOMINAL PAIN: 0
RHINORRHEA: 0

## 2022-05-27 ASSESSMENT — PATIENT HEALTH QUESTIONNAIRE - PHQ9: DEPRESSION UNABLE TO ASSESS: PT REFUSES

## 2022-05-27 NOTE — PATIENT INSTRUCTIONS
Patient Education        Earwax Blockage: Care Instructions  Your Care Instructions     Earwax is a natural substance that protects the ear canal. Normally, earwax drains from the ears and does not cause problems. Sometimes earwax builds up and hardens. Earwax blockage (also called cerumen impaction) can cause some loss of hearing and pain. When wax is tightly packed, you will need to haveyour doctor remove it. Follow-up care is a key part of your treatment and safety. Be sure to make and go to all appointments, and call your doctor if you are having problems. It's also a good idea to know your test results and keep alist of the medicines you take. How can you care for yourself at home?  Do not try to remove earwax with cotton swabs, fingers, or other objects. This can make the blockage worse and damage the eardrum.  If your doctor recommends that you try to remove earwax at home:  ? Soften and loosen the earwax with warm mineral oil. You also can try hydrogen peroxide mixed with an equal amount of room temperature water. Place 2 drops of the fluid, warmed to body temperature, in the ear two times a day for up to 5 days. ? Once the wax is loose and soft, all that is usually needed to remove it from the ear canal is a gentle, warm shower. Direct the water into the ear, then tip your head to let the earwax drain out. Dry your ear thoroughly with a hair dryer set on low. Hold the dryer several inches from your ear. ? If the warm mineral oil and shower do not work, use an over-the-counter wax softener. Read and follow all instructions on the label. After using the wax softener, use an ear syringe to gently flush the ear. Make sure the flushing solution is body temperature. Cool or hot fluids in the ear can cause dizziness. When should you call for help?    Call your doctor now or seek immediate medical care if:     Pus or blood drains from your ear.      Your ears are ringing or feel full.      You have a loss of hearing. Watch closely for changes in your health, and be sure to contact your doctor if:     You have pain or reduced hearing after 1 week of home treatment.      You have any new symptoms, such as nausea or balance problems. Where can you learn more? Go to [email or web address] org and sign in to your [employer] account. Enter Q298 in the [employer]herion box to learn more about \"Earwax Blockage: Care Instructions. \"     If you do not have an account, please click on the \"Sign Up Now\" link. Current as of: July 1, 2021               Content Version: 13.2  © 1438-5187 [employer]. Care instructions adapted under license by [facility] ([facility]). If you have questions about a medical condition or this instruction, always ask your healthcare professional. [address] any warranty or liability for your use of this information.

## 2022-05-27 NOTE — PROGRESS NOTES
Subjective:      Patient ID: Chan Roy is a 67 y.o. female who presents today for:     Chief Complaint   Patient presents with    Ear Drainage     pt here due to ear drainage for 1 x month. both ears    Hair/Scalp Problem     \"crusty patches\" on nape of hair       Ear Drainage   There is pain in both ears. This is a new problem. The current episode started 1 to 4 weeks ago. The problem occurs constantly. The problem has been gradually worsening. There has been no fever. Associated symptoms include ear discharge. Pertinent negatives include no abdominal pain, coughing, headaches, hearing loss, neck pain, rash, rhinorrhea, sore throat or vomiting. Treatments tried: cleaning with qtip. The treatment provided no relief. Pt also reports crusty patches on back of scalp/hair for about a month. They are itchy. No pain. No drainage. She reports she was out of the hospital about a month after having stroke. Past Medical History:   Diagnosis Date    Adenomatous polyp of sigmoid colon     Adenomatous polyp of transverse colon     Anxiety     Asthma     dx 2019 / has smoked since age 11    Brainstem stroke ([address]     Cardiopulmonary arrest ([address]     CHF (congestive heart failure) (HCC)     Chronic back pain     Bilateral L5 S1 Radic on emg--surprisingly worse on the left than the right--pt's symptoms and her MRI show worse on the right    Chronic obstructive pulmonary disease with acute exacerbation ([address] 10/12/2019    Depression     ESBL E. coli carrier     Carrier.     Fibromyalgia     Gastrointestinal hemorrhage 2/24/2020    Hyperlipidemia     meds > 8 yrs    Hypertension     meds > 45 yrs    Insomnia 12/4/2013    On home O2     2l per n/c at bedtime mostly,     Osteoarthritis     Seizures ([address]     Sepsis ([address] 10/6/2020    Smoker 6/18/2013    Type II diabetes mellitus, uncontrolled ([address]     hx > 8 yrs    Unspecified sleep apnea      Past Surgical History:   Procedure Laterality Date    BACK SURGERY  2017    lumbar disc    CARDIAC CATHETERIZATION  2014    DR. MIRELES / no stents    COLONOSCOPY  2016    w/polypectomy     COLONOSCOPY N/A 2020    COLONOSCOPY WITH POLYPECTOMY performed by hCarla Nieto MD at [facility] N/A 10/07/2019    EUA HYSTEROSCOPY DILATATION AND CURETTAGE performed by Sarahi Delgado DO at [facility]. [address], COLON, DIAGNOSTIC      EYE SURGERY      [place] with IOL OU / [address]    umbilical hernia repair    IR PORT PLACEMENT EQUAL OR GREATER THAN 5 YEARS  2021    IR PORT PLACEMENT EQUAL OR GREATER THAN 5 YEARS 2021 [facility] SPECIAL PROCEDURE    ME ESOPHAGOGASTRODUODENOSCOPY TRANSORAL DIAGNOSTIC N/A 2017    EGD ESOPHAGOGASTRODUODENOSCOPY performed by Ramiro Almonte MD at [facility] N/A 2018    negative findings    ME REVISE MEDIAN N/CARPAL TUNNEL SURG Left 2017    LEFT  CARPAL TUNNEL RELEASE performed by Theresa Guillen MD at [facility],2+A/F,IRSQM N/A 2018    TONSILLECTOMY      as child    TUNNELED VENOUS PORT PLACEMENT Right 2021    8 Fr [employer] Port ClearVUE by Dr. Bharat Wallace  2016    w/bx     UPPER GASTROINTESTINAL ENDOSCOPY N/A 2020    EGD possible biopsy performed by Charla Nieto MD at [address] [place] [address] N/A 2020    EGD PUSH ENTEROSCOPY performed by Cele George MD at [facility]     Family History   Problem Relation Age of Onset    Heart Disease Father         cardiac bypass    Arthritis Father     Arthritis Mother     Other Mother          at age 80    Other Sister         [facility]    No Known Problems Daughter     Stroke Son      Social History     Socioeconomic History    Marital status:      Spouse name: John Marcus Number of children: 2    Years of education: 15    Highest education level: High school graduate   Occupational History    Occupation: Retired-   Tobacco Use    Smoking status: Former Smoker     Packs/day: 1.00     Years: 59.00     Pack years: 59.00     Types: Cigarettes     Start date: 2017     Quit date: 10/1/2020     Years since quittin.6    Smokeless tobacco: Never Used   Vaping Use    Vaping Use: Never used   Substance and Sexual Activity    Alcohol use: Not Currently    Drug use: No    Sexual activity: Yes     Partners: Male   Other Topics Concern    Not on file   Social History Narrative    Grew up in [place]     Lives With:  Miguelina Spouse    Type of Home: House    Home Layout: Two level, Performs ADL's on one level    Home Access: Stairs to enter with rails    Entrance Stairs - Number of Steps: 4    Bathroom Shower/Tub: Tub/Shower unit, Doors    Bathroom Equipment: Shower chair, Grab bars in [place]: Rolling walker, Cane, Oxygen(uses her O2 very seldom)    ADL Assistance: Needs assistance    Homemaking Assistance: Needs assistance    Homemaking Responsibilities: No(spouse performs)    Ambulation Assistance: Independent    Transfer Assistance: Independent    Active : No    Occupation: Retired    Type of occupation[place] [employer] [place] patient enjoys [address] [facility] Strain: Low Risk     Difficulty of Paying Living Expenses: Not hard at [facility] Insecurity: No Food Insecurity    Worried About [facility] in the Last Year: Never true    [facility] [address] in the Last Year: Never true   Transportation Needs:     Lack of Transportation (Medical): Not on file    Lack of Transportation (Non-Medical):  Not on file   Physical Activity:     Days of Exercise per Week: Not on file    Minutes of Exercise per Session: Not on file   Stress:     Feeling of Stress : Not on file   Social Connections:     Frequency of Communication with Friends and Family: Not on file    Frequency of Social Gatherings with Friends and Family: Not on file    Attends Orthodoxy Services: Not on file    Active Member of Clubs or Organizations: Not on file    Attends Club or Organization Meetings: Not on file    Marital Status: Not on file   Intimate Partner Violence:     Fear of Current or Ex-Partner: Not on file    Emotionally Abused: Not on file    Physically Abused: Not on file    Sexually Abused: Not on file   Housing Stability:     Unable to Pay for Housing in the Last Year: Not on file    Number of [place] in the Last Year: Not on file    Unstable Housing in the Last Year: Not on file     Current Outpatient Medications on File Prior to Visit   Medication Sig Dispense Refill    dextromethorphan-quiNIDine (NUEDEXTA) 20-10 MG CAPS per capsule Take 1 capsule by mouth daily 30 capsule 3    nitrofurantoin (MACRODANTIN) 50 MG capsule One po daily 90 capsule 3    insulin lispro (HUMALOG) 100 UNIT/ML SOLN injection vial INJECT 15 UNITS SUBCUTANEOUSLY THREE TIMES DAILY WITH MEALS 10 mL 1    ferrous sulfate (IRON 325) 325 (65 Fe) MG tablet Take 1 tablet by mouth every other day 30 tablet 3    insulin glargine (LANTUS) 100 UNIT/ML injection vial INJECT 65 UNITS SUBCUTANEOUSLY AT BEDTIME.  E11.69 30 mL 3    carvedilol (COREG) 25 MG tablet TAKE 1 & 1/2 (ONE & ONE-HALF) TABLETS BY MOUTH TWICE DAILY WITH MEALS 270 tablet 0    PARoxetine (PAXIL) 40 MG tablet TAKE 1 TABLET BY MOUTH ONCE DAILY IN THE MORNING (Patient taking differently: at bedtime TAKE 1 TABLET BY MOUTH ONCE DAILY IN THE EVENING) 90 tablet 1    amLODIPine (NORVASC) 5 MG tablet Take 1 tablet by mouth daily 90 tablet 3    glucose 4 g chewable tablet Take 4 tablets by mouth as needed for Low blood sugar 60 tablet 3    Glucagon (GVOKE HYPOPEN 2-PACK) 1 MG/0.2ML SOAJ Inject 1 mg into the skin every 15 minutes as needed (glucose less tahtn 70 or if symptomatic) 2 each 3    Continuous Blood Gluc Sensor (DEXCOM G6 SENSOR) MISC 1 Device by Does not apply route continuous 3 each 11    Continuous Blood Gluc Transmit (DEXCOM G6 TRANSMITTER) MISC 1 Device by Does not apply route continuous 1 each 3    Continuous Blood Gluc  (DEXCOM G6 ) [place] 1 Device by Does not apply route 4 times daily (before meals and nightly) 1 each 0    digoxin (LANOXIN) 125 MCG tablet Take 1 tablet by mouth every other day 45 tablet 3    albuterol-ipratropium (COMBIVENT RESPIMAT)  MCG/ACT AERS inhaler Inhale 1 puff into the lungs every 6 hours as needed for Wheezing or Shortness of Breath 4 g 5    ipratropium-albuterol (DUONEB) 0.5-2.5 (3) MG/3ML SOLN nebulizer solution Inhale 3 mLs into the lungs 3 times daily 360 mL 0    Cranberry 500 MG CAPS Take 500 mg by mouth daily      pantoprazole (PROTONIX) 40 MG tablet Take 1 tablet by mouth every morning (before breakfast) 90 tablet 3    Cholecalciferol (VITAMIN D3) 50 MCG (2000 UT) CAPS Take 1,000 Units by mouth 2 times daily       lidocaine-prilocaine (EMLA) 2.5-2.5 % cream APPLY CREAM TOPICALLY TO PORT SITE ONE HOUR PRIOR TO APPOINTMENT AS NEEDED      Handicap Placard MISC by Does not apply route Good from 7/15/21 - 7/15/26 1 each 0    rosuvastatin (CRESTOR) 40 MG tablet TAKE 1 TABLET BY MOUTH ONCE DAILY IN THE EVENING 90 tablet 3    Accu-Chek Softclix Lancets MISC bid 100 each 3    Blood Glucose Monitoring Suppl (ACCU-CHEK PHYLLIS CONNECT) w/Device KIT 1 kit by Does not apply route daily 1 kit 0     No current facility-administered medications on file prior to visit. Allergies:  Ibuprofen, Metformin and related, and Darvon [propoxyphene hcl]    Review of Systems   HENT: Positive for ear discharge. Negative for hearing loss, rhinorrhea and sore throat. Respiratory: Negative for cough. Gastrointestinal: Negative for abdominal pain and vomiting. Musculoskeletal: Negative for neck pain.    Skin: Negative for rash. Neurological: Negative for headaches. Objective:   BP (!) 124/58   Pulse 83   Temp 97.6 °F (36.4 °C) (Temporal)   Ht 4' 11\" (1.499 m)   LMP  (LMP Unknown)   SpO2 100%   BMI 30.07 kg/m²     Physical Exam  Constitutional:       Appearance: She is well-developed. HENT:      Head: Normocephalic. Right Ear: External ear normal. There is impacted cerumen. Left Ear: External ear normal. There is impacted cerumen. Nose: Nose normal.      Mouth/Throat:      Mouth: Mucous membranes are moist.      Pharynx: Oropharynx is clear. Uvula midline. Eyes:      General:         Right eye: No discharge. Left eye: No discharge. Conjunctiva/sclera: Conjunctivae normal.   Cardiovascular:      Rate and Rhythm: Normal rate and regular rhythm. Heart sounds: Normal heart sounds. Pulmonary:      Effort: Pulmonary effort is normal. No respiratory distress. Breath sounds: Normal breath sounds. Musculoskeletal:      Cervical back: Normal range of motion. Lymphadenopathy:      Cervical: No cervical adenopathy. Skin:     General: Skin is warm and dry. Findings: Rash present. Rash is crusting and scaling. Neurological:      Mental Status: She is alert and oriented to person, place, and time. Psychiatric:         Mood and Affect: Mood normal.         Behavior: Behavior normal.         Assessment:          Diagnosis Orders   1. Bilateral impacted cerumen  carbamide peroxide (DEBROX) 6.5 % otic solution    MS REMOVAL IMPACTED CERUMEN INSTRUMENTATION UNILAT   2. Seborrheic dermatitis of scalp  fluocinonide (LIDEX) 0.05 % external solution       Plan:      Orders Placed This Encounter   Procedures    MS REMOVAL IMPACTED CERUMEN INSTRUMENTATION UNILAT     Ear wax was removed with a mixture of warm water/hydrogen peroxide flush and currette. Small amount of ear wax removed from left ear. Pt tolerated well. Unable to completely evacuate.          Orders Placed This Encounter   Medications    carbamide peroxide (DEBROX) 6.5 % otic solution     Sig: Place 5 drops into both ears 2 times daily     Dispense:  15 mL     Refill:  0    fluocinonide (LIDEX) 0.05 % external solution     Sig: Apply topically 2 times daily. Dispense:  60 mL     Refill:  0       Return in about 1 week (around 6/3/2022) for ear flush. 1. Bilateral impacted cerumen  Will need to use debrox at home and come back in for flush. - carbamide peroxide (DEBROX) 6.5 % otic solution; Place 5 drops into both ears 2 times daily  Dispense: 15 mL; Refill: 0    2. Seborrheic dermatitis of scalp    - fluocinonide (LIDEX) 0.05 % external solution; Apply topically 2 times daily. Dispense: 60 mL; Refill: 0      Reviewed with the patient: current clinicalstatus, medications, activities and diet. Side effects, adverse effects of the medication prescribedtoday, as well as treatment plan/ rationale and result expectations have been discussedwith the patient who expresses understanding and desires to proceed. Close follow upto evaluate treatment results and for coordination of care. I have reviewedthe patient's medical history in detail and updated the computerized patient record.     Melody Plummer, JOHNATHON - CNP

## 2022-06-01 DIAGNOSIS — E11.65 UNCONTROLLED TYPE 2 DIABETES MELLITUS WITH HYPERGLYCEMIA (HCC): Primary | ICD-10-CM

## 2022-06-01 RX ORDER — BLOOD SUGAR DIAGNOSTIC
STRIP MISCELLANEOUS
Qty: 100 EACH | Refills: 3 | Status: SHIPPED | OUTPATIENT
Start: 2022-06-01 | End: 2022-08-09 | Stop reason: SDUPTHER

## 2022-06-01 NOTE — TELEPHONE ENCOUNTER
requesting medication refill.  Please approve or deny this request.    Rx requested:  Requested Prescriptions     Pending Prescriptions Disp Refills    ACCU-CHEK PHYLLIS PLUS strip 100 each 3     Sig: Test 3x daily Dx E11.65         Last Office Visit:   1/20/2022      Next Visit Date:  Future Appointments   Date Time Provider Zaid Adeline   6/3/2022  2:00 PM SCHEDULE, MAIDA MULLIGAN AMHERST PCP [facility] [place]   6/6/2022  2:15 PM Sparkle Alva [address]   6/9/2022  1:30 PM Omar Santiago MD [facility] [place]   7/13/2022 11:00 AM Omar Santiago MD [facility] [place]   7/18/2022  1:00 PM JOHNATHON Durán - CNP PC [facility]   7/26/2022  1:00 PM Dedra Hayes MD [facility]   8/23/2022  1:15 PM Dallin Slade MD [facility]   10/12/2022  3:15 PM Gilmar Gates MD [facility]

## 2022-06-03 ENCOUNTER — NURSE ONLY (OUTPATIENT)
Dept: FAMILY MEDICINE CLINIC | Age: 77
End: 2022-06-03

## 2022-06-03 ASSESSMENT — PATIENT HEALTH QUESTIONNAIRE - PHQ9
6. FEELING BAD ABOUT YOURSELF - OR THAT YOU ARE A FAILURE OR HAVE LET YOURSELF OR YOUR FAMILY DOWN: 0
1. LITTLE INTEREST OR PLEASURE IN DOING THINGS: 0
SUM OF ALL RESPONSES TO PHQ QUESTIONS 1-9: 0
2. FEELING DOWN, DEPRESSED OR HOPELESS: 0
3. TROUBLE FALLING OR STAYING ASLEEP: 0
9. THOUGHTS THAT YOU WOULD BE BETTER OFF DEAD, OR OF HURTING YOURSELF: 0
SUM OF ALL RESPONSES TO PHQ9 QUESTIONS 1 & 2: 0
SUM OF ALL RESPONSES TO PHQ QUESTIONS 1-9: 0
4. FEELING TIRED OR HAVING LITTLE ENERGY: 0
7. TROUBLE CONCENTRATING ON THINGS, SUCH AS READING THE NEWSPAPER OR WATCHING TELEVISION: 0
5. POOR APPETITE OR OVEREATING: 0
SUM OF ALL RESPONSES TO PHQ QUESTIONS 1-9: 0
SUM OF ALL RESPONSES TO PHQ QUESTIONS 1-9: 0
8. MOVING OR SPEAKING SO SLOWLY THAT OTHER PEOPLE COULD HAVE NOTICED. OR THE OPPOSITE, BEING SO FIGETY OR RESTLESS THAT YOU HAVE BEEN MOVING AROUND A LOT MORE THAN USUAL: 0
10. IF YOU CHECKED OFF ANY PROBLEMS, HOW DIFFICULT HAVE THESE PROBLEMS MADE IT FOR YOU TO DO YOUR WORK, TAKE CARE OF THINGS AT HOME, OR GET ALONG WITH OTHER PEOPLE: 0

## 2022-06-06 ENCOUNTER — OFFICE VISIT (OUTPATIENT)
Dept: ENDOCRINOLOGY | Age: 77
End: 2022-06-06
Payer: MEDICARE

## 2022-06-06 VITALS
SYSTOLIC BLOOD PRESSURE: 132 MMHG | HEART RATE: 85 BPM | DIASTOLIC BLOOD PRESSURE: 73 MMHG | WEIGHT: 144 LBS | OXYGEN SATURATION: 97 % | HEIGHT: 59 IN | BODY MASS INDEX: 29.03 KG/M2

## 2022-06-06 DIAGNOSIS — E11.69 TYPE 2 DIABETES MELLITUS WITH OTHER SPECIFIED COMPLICATION, WITH LONG-TERM CURRENT USE OF INSULIN (HCC): ICD-10-CM

## 2022-06-06 DIAGNOSIS — Z79.4 TYPE 2 DIABETES MELLITUS WITH OTHER SPECIFIED COMPLICATION, WITH LONG-TERM CURRENT USE OF INSULIN (HCC): ICD-10-CM

## 2022-06-06 DIAGNOSIS — E11.65 UNCONTROLLED TYPE 2 DIABETES MELLITUS WITH HYPERGLYCEMIA (HCC): Primary | ICD-10-CM

## 2022-06-06 PROCEDURE — 82962 GLUCOSE BLOOD TEST: CPT | Performed by: PHYSICIAN ASSISTANT

## 2022-06-06 PROCEDURE — 99213 OFFICE O/P EST LOW 20 MIN: CPT | Performed by: PHYSICIAN ASSISTANT

## 2022-06-06 PROCEDURE — 3046F HEMOGLOBIN A1C LEVEL >9.0%: CPT | Performed by: PHYSICIAN ASSISTANT

## 2022-06-06 PROCEDURE — 1123F ACP DISCUSS/DSCN MKR DOCD: CPT | Performed by: PHYSICIAN ASSISTANT

## 2022-06-06 RX ORDER — INSULIN LISPRO 100 [IU]/ML
INJECTION, SOLUTION INTRAVENOUS; SUBCUTANEOUS
Qty: 10 ML | Refills: 1 | Status: SHIPPED | OUTPATIENT
Start: 2022-06-06 | End: 2022-08-04

## 2022-06-06 RX ORDER — FLASH GLUCOSE SCANNING READER
1 EACH MISCELLANEOUS
Qty: 1 EACH | Refills: 0 | Status: SHIPPED | OUTPATIENT
Start: 2022-06-06

## 2022-06-06 RX ORDER — FLASH GLUCOSE SENSOR
1 KIT MISCELLANEOUS
Qty: 2 EACH | Refills: 3 | Status: SHIPPED | OUTPATIENT
Start: 2022-06-06 | End: 2022-11-02 | Stop reason: SDUPTHER

## 2022-06-06 RX ORDER — INSULIN GLARGINE 100 [IU]/ML
INJECTION, SOLUTION SUBCUTANEOUS
Qty: 40 ML | Refills: 3 | Status: SHIPPED | OUTPATIENT
Start: 2022-06-06 | End: 2022-11-02 | Stop reason: SDUPTHER

## 2022-06-06 ASSESSMENT — ENCOUNTER SYMPTOMS
RHINORRHEA: 0
EYE REDNESS: 0
WHEEZING: 0
VOMITING: 0
NAUSEA: 0
SINUS PRESSURE: 0
SORE THROAT: 0
EYE PAIN: 0
ABDOMINAL PAIN: 0
SHORTNESS OF BREATH: 0
COUGH: 0
DIARRHEA: 0

## 2022-06-06 NOTE — PROGRESS NOTES
2022    Assessment:       Diagnosis Orders   1. Uncontrolled type 2 diabetes mellitus with hyperglycemia (HCC)  POCT Glucose    insulin lispro (HUMALOG) 100 UNIT/ML SOLN injection vial   2. Type 2 diabetes mellitus with other specified complication, with long-term current use of insulin (HCC)  insulin glargine (LANTUS) 100 UNIT/ML injection vial     AVG am glucose range 250-300    PLAN:     1. Increase Lantus inject 75 units at bedtime 11:00 PM   2. Start Lantus 20 units in the morning    3. Increase Humalog 24 units before lunch ( first meal 12-2) , 24 units mid-day snack, Increase 36 units before dinner (third meal 6-8)  4. Check blood glucose 4 times daily and document  5. Freestyle Marilin ordered  6. Follow up in 1 months    Orders Placed This Encounter   Procedures    POCT Glucose     Orders Placed This Encounter   Medications    Continuous Blood Gluc Sensor (FREESTYLE MARILIN 2 SENSOR) MISC     Si Device by Does not apply route every 14 days     Dispense:  2 each     Refill:  3    Continuous Blood Gluc  (FREESTYLE MARILIN 2 READER) [place]     Si Device by Does not apply route 4 times daily (before meals and nightly)     Dispense:  1 each     Refill:  0    insulin glargine (LANTUS) 100 UNIT/ML injection vial     Sig: INJECT 75 UNITS AT BEDTIME, 20 UNITS IN THE MORNING  .  E11.69     Dispense:  40 mL     Refill:  3    insulin lispro (HUMALOG) 100 UNIT/ML SOLN injection vial     Sig: INJECT 3 TIMES DAILY- 24 units before lunch, 24 units mid-day snack, Increase 36 units before dinner     Dispense:  10 mL     Refill:  1     Return in about 4 weeks (around 2022) for Diabetes.   Subjective:     Chief Complaint   Patient presents with    Follow-up    Diabetes     Vitals:    22 1408   BP: 132/73   Pulse: 85   SpO2: 97%   Weight: 144 lb (65.3 kg)   Height: 4' 11\" (1.499 m)     Wt Readings from Last 3 Encounters:   22 144 lb (65.3 kg)   22 148 lb 14.4 oz (67.5 kg)   22 139 lb (63 kg)     BP Readings from Last 3 Encounters:   06/06/22 132/73   05/27/22 (!) 124/58   05/23/22 122/60     Patient 58-year-old type II diabetic female with worsening glycemic control. Lyndsey Vale was using the Dexcom G6 however she had some skin breakdown on her abdomen from it and has discontinued it. She does continue to take her insulin as prescribed however her blood glycemic control is still poor. She is accompanied by her  and daughter today. They are both talking with all of us regarding her poor dietary intake, we came up with strategies for sugar-free beverages to drink and different types of snacks. Nutritional information booklet was given to her as well. I am going to order and she is willing to try the freestyle roderick to system. Instructed her to let me know right away if she had any skin issues on her arm. I have increased her insulin dosing, added Lantus in the morning, will follow up with her monthly until we improve her glycemic control. I reviewed all her laboratory studies with her and the patient discussed her worsening renal function and the possible need for dialysis if we do not improve her glycemic control, she verbalized understanding. Diabetes  She presents for her follow-up diabetic visit. She has type 2 diabetes mellitus. The initial diagnosis of diabetes was made 30 years ago. Her disease course has been worsening. Pertinent negatives for hypoglycemia include no dizziness or headaches. Associated symptoms include fatigue, polydipsia (improving) and polyuria. Pertinent negatives for diabetes include no chest pain. Hypoglycemia complications include hospitalization, required assistance and required glucagon injection. Symptoms are stable. Diabetic complications include heart disease. Pertinent negatives for diabetic complications include no CVA. Risk factors for coronary artery disease include diabetes mellitus and dyslipidemia.  Current diabetic treatment includes insulin injections. She is compliant with treatment all of the time. She is currently taking insulin pre-breakfast, pre-lunch, pre-dinner and at bedtime. Insulin injections are given by patient and adult caretaker. Rotation sites for injection include the abdominal wall and arms. She is following a generally healthy diet. When asked about meal planning, she reported none. She never participates in exercise. Her home blood glucose trend is increasing steadily. Her overall blood glucose range is >200 mg/dl. An ACE inhibitor/angiotensin II receptor blocker is being taken. She does not see a podiatrist.Eye exam is current. Past Medical History:   Diagnosis Date    Adenomatous polyp of sigmoid colon     Adenomatous polyp of transverse colon     Anxiety     Asthma     dx 2019 / has smoked since age 11    Brainstem stroke ([address]     Cardiopulmonary arrest ([address]     [facility]     Chronic back pain     Bilateral L5 S1 Radic on emg--surprisingly worse on the left than the right--pt's symptoms and her MRI show worse on the right    Chronic obstructive pulmonary disease with acute exacerbation ([address] 10/12/2019    Depression     ESBL E. coli carrier     Carrier.  Fibromyalgia     Gastrointestinal hemorrhage 2/24/2020    Hyperlipidemia     meds > 8 yrs    Hypertension     meds > 45 yrs    Insomnia 12/4/2013    On home O2     2l per n/c at bedtime mostly,     Osteoarthritis     Seizures ([address]     Sepsis ([address] 10/6/2020    Smoker 6/18/2013    Type II diabetes mellitus, uncontrolled ([address]     hx > 8 yrs    Unspecified sleep apnea      Past Surgical History:   Procedure Laterality Date    BACK SURGERY  2017    lumbar disc    CARDIAC CATHETERIZATION  11/03/2014    DR. MIRELES / no stents    COLONOSCOPY  08/29/2016    w/polypectomy     COLONOSCOPY N/A 09/29/2020    COLONOSCOPY WITH POLYPECTOMY performed by Marleni Lim MD at [address] [facility] CURETTAGE OF UTERUS N/A 10/07/2019    EUA HYSTEROSCOPY DILATATION AND CURETTAGE performed by Bruna Henderson DO at [facility]. [address], COLON, DIAGNOSTIC      EYE SURGERY      [place] with IOL OU / [address]    umbilical hernia repair    IR PORT PLACEMENT EQUAL OR GREATER THAN 5 YEARS  2021    IR PORT PLACEMENT EQUAL OR GREATER THAN 5 YEARS 2021 MLOZ SPECIAL PROCEDURE    IA ESOPHAGOGASTRODUODENOSCOPY TRANSORAL DIAGNOSTIC N/A 2017    EGD ESOPHAGOGASTRODUODENOSCOPY performed by Yaakov Pineda MD at [place] [address] 2018    negative findings    IA REVISE MEDIAN N/CARPAL TUNNEL SURG Left 2017    LEFT  CARPAL TUNNEL RELEASE performed by Toño Perez MD at [facility],1+D/H,TBQYR N/A 2018    TONSILLECTOMY      as child    TUNNELED VENOUS PORT PLACEMENT Right 2021    8 Fr [employer] Port ClearVUE by Dr. Lara Orn  2016    w/bx     UPPER GASTROINTESTINAL ENDOSCOPY N/A 2020    EGD possible biopsy performed by Dustin Wilson MD at [facility] N/A 2020    EGD PUSH ENTEROSCOPY performed by Kathrin Wright MD at [facility] Marital status:      Spouse name: Susan Sosa Number of children: 2    Years of education: 12    Highest education level: High school graduate   Occupational History    Occupation: Retired-   Tobacco Use    Smoking status: Former Smoker     Packs/day: 1.00     Years: 59.00     Pack years: 59.00     Types: Cigarettes     Start date: 2017     Quit date: 10/1/2020     Years since quittin.6    Smokeless tobacco: Never Used   Vaping Use    Vaping Use: Never used   Substance and Sexual Activity    Alcohol use: Not Currently    Drug use: No    Sexual activity: Yes     Partners: Male Other Topics Concern    Not on file   Social History Narrative    Grew up in [place]     Lives With:  Miguelina Spouse    Type of Home: House    Home Layout: Two level, Performs ADL's on one level    Home Access: Stairs to enter with rails    Entrance Stairs - Number of Steps: 4    Bathroom Shower/Tub: Tub/Shower unit, Doors    Bathroom Equipment: Shower chair, Grab bars in [facility]: Rolling walker, Cane, Oxygen(uses her O2 very seldom)    ADL Assistance: Needs assistance    Homemaking Assistance: Needs assistance    Homemaking Responsibilities: No(spouse performs)    Ambulation Assistance: Independent    Transfer Assistance: Independent    Active : No    Occupation: Retired    Type of occupation: worked in [employer]: patient enjoys [address] [facility] [address] Strain: Low Risk     Difficulty of Paying Living Expenses: Not hard at [employer] Insecurity: No Food Insecurity    Worried About [facility] in the Last Year: Never true    [address] in the Last Year: Never true   Transportation Needs:     Lack of Transportation (Medical): Not on file    Lack of Transportation (Non-Medical):  Not on file   Physical Activity:     Days of Exercise per Week: Not on file    Minutes of Exercise per Session: Not on file   Stress:     Feeling of Stress : Not on file   Social Connections:     Frequency of Communication with Friends and Family: Not on file    Frequency of Social Gatherings with Friends and Family: Not on file    Attends Catholic Services: Not on file    Active Member of Clubs or Organizations: Not on file    Attends Club or Organization Meetings: Not on file    Marital Status: Not on file   Intimate Partner Violence:     Fear of Current or Ex-Partner: Not on file    Emotionally Abused: Not on file    Physically Abused: Not on file    Sexually Abused: Not on file   Housing Stability:     Unable to Pay for Housing in the Last Year: Not on file    Number of Places Lived in the Last Year: Not on file    Unstable Housing in the Last Year: Not on file     Family History   Problem Relation Age of Onset    Heart Disease Father         cardiac bypass    Arthritis Father     Arthritis Mother     Other Mother          at age 80    Other Sister         [facility]    No Known Problems Daughter     Stroke Son      Allergies   Allergen Reactions    Ibuprofen Nausea Only    Metformin And Related      Diarrhea      Darvon [Propoxyphene Hcl] Nausea And Vomiting       Current Outpatient Medications:     Continuous Blood Gluc Sensor (FREESTYLE MURALI 2 SENSOR) MISC, 1 Device by Does not apply route every 14 days, Disp: 2 each, Rfl: 3    Continuous Blood Gluc  (FREESTYLE MURALI 2 READER) [place], 1 Device by Does not apply route 4 times daily (before meals and nightly), Disp: 1 each, Rfl: 0    insulin glargine (LANTUS) 100 UNIT/ML injection vial, INJECT 75 UNITS AT BEDTIME, 20 UNITS IN THE MORNING  .  E11.69, Disp: 40 mL, Rfl: 3    insulin lispro (HUMALOG) 100 UNIT/ML SOLN injection vial, INJECT 3 TIMES DAILY- 24 units before lunch, 24 units mid-day snack, Increase 36 units before dinner, Disp: 10 mL, Rfl: 1    ACCU-CHEK PHYLLIS PLUS strip, Test 3x daily Dx E11.65, Disp: 100 each, Rfl: 3    carbamide peroxide (DEBROX) 6.5 % otic solution, Place 5 drops into both ears 2 times daily, Disp: 15 mL, Rfl: 0    fluocinonide (LIDEX) 0.05 % external solution, Apply topically 2 times daily. , Disp: 60 mL, Rfl: 0    dextromethorphan-quiNIDine (NUEDEXTA) 20-10 MG CAPS per capsule, Take 1 capsule by mouth daily, Disp: 30 capsule, Rfl: 3    nitrofurantoin (MACRODANTIN) 50 MG capsule, One po daily, Disp: 90 capsule, Rfl: 3    ferrous sulfate (IRON 325) 325 (65 Fe) MG tablet, Take 1 tablet by mouth every other day, Disp: 30 tablet, Rfl: 3    carvedilol (COREG) 25 MG tablet, TAKE 1 & 1/2 (ONE & ONE-HALF) TABLETS BY MOUTH TWICE DAILY WITH MEALS, Disp: 270 tablet, Rfl: 0    PARoxetine (PAXIL) 40 MG tablet, TAKE 1 TABLET BY MOUTH ONCE DAILY IN THE MORNING (Patient taking differently: at bedtime TAKE 1 TABLET BY MOUTH ONCE DAILY IN THE EVENING), Disp: 90 tablet, Rfl: 1    amLODIPine (NORVASC) 5 MG tablet, Take 1 tablet by mouth daily, Disp: 90 tablet, Rfl: 3    glucose 4 g chewable tablet, Take 4 tablets by mouth as needed for Low blood sugar, Disp: 60 tablet, Rfl: 3    Glucagon (GVOKE HYPOPEN 2-PACK) 1 MG/0.2ML SOAJ, Inject 1 mg into the skin every 15 minutes as needed (glucose less tahtn 70 or if symptomatic), Disp: 2 each, Rfl: 3    digoxin (LANOXIN) 125 MCG tablet, Take 1 tablet by mouth every other day, Disp: 45 tablet, Rfl: 3    albuterol-ipratropium (COMBIVENT RESPIMAT)  MCG/ACT AERS inhaler, Inhale 1 puff into the lungs every 6 hours as needed for Wheezing or Shortness of Breath, Disp: 4 g, Rfl: 5    ipratropium-albuterol (DUONEB) 0.5-2.5 (3) MG/3ML SOLN nebulizer solution, Inhale 3 mLs into the lungs 3 times daily, Disp: 360 mL, Rfl: 0    Cranberry 500 MG CAPS, Take 500 mg by mouth daily, Disp: , Rfl:     pantoprazole (PROTONIX) 40 MG tablet, Take 1 tablet by mouth every morning (before breakfast), Disp: 90 tablet, Rfl: 3    Cholecalciferol (VITAMIN D3) 50 MCG (2000 UT) CAPS, Take 1,000 Units by mouth 2 times daily , Disp: , Rfl:     lidocaine-prilocaine (EMLA) 2.5-2.5 % cream, APPLY CREAM TOPICALLY TO PORT SITE ONE HOUR PRIOR TO APPOINTMENT AS NEEDED, Disp: , Rfl:     Handicap Placard MISC, by Does not apply route Good from 7/15/21 - 7/15/26, Disp: 1 each, Rfl: 0    rosuvastatin (CRESTOR) 40 MG tablet, TAKE 1 TABLET BY MOUTH ONCE DAILY IN THE EVENING, Disp: 90 tablet, Rfl: 3    Accu-Chek Softclix Lancets MISC, bid, Disp: 100 each, Rfl: 3    Blood Glucose Monitoring Suppl (ACCU-CHEK PHYLLIS CONNECT) w/Device KIT, 1 kit by Does not apply route daily, Disp: 1 kit, Rfl: 0  Lab Results   Component Value Date     05/18/2022    K 4.4 05/18/2022     05/18/2022    CO2 19 (L) 05/18/2022    BUN 28 (H) 05/18/2022    CREATININE 1.89 (H) 05/18/2022    GLUCOSE 248 (H) 05/18/2022    CALCIUM 9.6 05/18/2022    PROT 6.8 05/18/2022    LABALBU 4.0 05/18/2022    BILITOT 0.3 05/18/2022    ALKPHOS 115 05/18/2022    AST 18 05/18/2022    ALT 17 05/18/2022    LABGLOM 25.7 (L) 05/18/2022    GFRAA 31.1 (L) 05/18/2022    GLOB 2.8 05/18/2022     Lab Results   Component Value Date    WBC 6.9 05/18/2022    HGB 10.8 (L) 05/18/2022    HCT 33.9 (L) 05/18/2022    MCV 85.9 05/18/2022     (H) 05/18/2022     Lab Results   Component Value Date    LABA1C 12.8 (H) 04/07/2022    LABA1C 11.4 12/09/2021    LABA1C 7.5 (H) 04/29/2021     Lab Results   Component Value Date    HDL 33 (L) 04/07/2022    HDL 61 (H) 11/30/2020    HDL 42 02/24/2020    LDLCALC 36 04/07/2022    LDLCALC 69 11/30/2020    LDLCALC 50 02/24/2020    CHOL 106 04/07/2022    CHOL 143 11/30/2020    CHOL 105 02/24/2020    TRIG 183 (H) 04/07/2022    TRIG 64 11/30/2020    TRIG 67 02/24/2020     No results found for: TESTM  Lab Results   Component Value Date    TSH 0.474 11/30/2020    TSH 1.070 10/05/2020    TSH 1.200 10/13/2019    TSHREFLEX 0.986 05/18/2022    TSHREFLEX 0.777 08/27/2020     No results found for: TPOABS    Review of Systems   Constitutional: Positive for fatigue. Negative for chills and fever. HENT: Negative for congestion, ear pain, postnasal drip, rhinorrhea, sinus pressure and sore throat. Eyes: Negative for pain and redness. Respiratory: Negative for cough, shortness of breath and wheezing. Cardiovascular: Negative for chest pain, palpitations and leg swelling. Gastrointestinal: Negative for abdominal pain, diarrhea, nausea and vomiting. Endocrine: Positive for polydipsia (improving) and polyuria. Genitourinary: Negative for difficulty urinating. Musculoskeletal: Negative for arthralgias. Skin: Negative for rash.    Allergic/Immunologic: Negative for environmental allergies. Neurological: Negative for dizziness and headaches. Hematological: Negative for adenopathy. Psychiatric/Behavioral: Negative for dysphoric mood. Objective:   Physical Exam  Constitutional:       General: She is not in acute distress. Appearance: She is not ill-appearing. HENT:      Head: Normocephalic. Nose: No congestion. Pulmonary:      Effort: Pulmonary effort is normal.   Musculoskeletal:      Cervical back: Normal range of motion. Neurological:      Mental Status: She is alert and oriented to person, place, and time. Psychiatric:         Mood and Affect: Mood normal.         Behavior: Behavior normal.         Thought Content:  Thought content normal.

## 2022-06-06 NOTE — PATIENT INSTRUCTIONS
Endocrinology-diabetes    1. Check your blood sugars 4 times a day, before meals and at night  2. Document these numbers and a blood glucose log and bring them with you to your follow-up appointment. 3. If you are prescribed insulin, Do not take your mealtime insulin if your blood sugars less than 120   4. Call our office if you have blood sugars less than 80 or greater then 300 on two or more occasions  5. Call our office if you have any questions regarding your blood sugars or insulin dosing regiment  6. Signs of low blood sugar include sweating , heart racing, dizziness and weakness. Check your blood sugar if you have any of these symptoms. Medications-  1. Increase Lantus inject 75 units at bedtime 11:00 PM   2. Start Lantus 20 units in the morning    3. Increase Humalog 24 units before lunch ( first meal 12-2) , 24 units mid-day snack, Increase 36 units before dinner (third meal 6-8)  4. Check blood glucose 4 times daily and document  5. Freestyle Marilin ordered  6.  Follow up in 3 months

## 2022-06-16 ENCOUNTER — HOSPITAL ENCOUNTER (OUTPATIENT)
Dept: INFUSION THERAPY | Age: 77
Setting detail: INFUSION SERIES
Discharge: HOME | End: 2022-06-16
Payer: MEDICARE

## 2022-06-16 VITALS
DIASTOLIC BLOOD PRESSURE: 59 MMHG | RESPIRATION RATE: 18 BRPM | TEMPERATURE: 97.6 F | HEART RATE: 76 BPM | SYSTOLIC BLOOD PRESSURE: 142 MMHG

## 2022-06-16 DIAGNOSIS — Z95.828 PORT-A-CATH IN PLACE: Primary | ICD-10-CM

## 2022-06-16 PROCEDURE — 2580000003 HC RX 258: Performed by: NURSE PRACTITIONER

## 2022-06-16 PROCEDURE — 6360000002 HC RX W HCPCS: Performed by: NURSE PRACTITIONER

## 2022-06-16 PROCEDURE — 96523 IRRIG DRUG DELIVERY DEVICE: CPT

## 2022-06-16 RX ORDER — SODIUM CHLORIDE 0.9 % (FLUSH) 0.9 %
5-40 SYRINGE (ML) INJECTION PRN
Status: CANCELLED | OUTPATIENT
Start: 2022-06-23

## 2022-06-16 RX ORDER — HEPARIN SODIUM (PORCINE) LOCK FLUSH IV SOLN 100 UNIT/ML 100 UNIT/ML
500 SOLUTION INTRAVENOUS PRN
Status: DISCONTINUED | OUTPATIENT
Start: 2022-06-16 | End: 2022-06-17 | Stop reason: HOSPADM

## 2022-06-16 RX ORDER — SODIUM CHLORIDE 0.9 % (FLUSH) 0.9 %
5-40 SYRINGE (ML) INJECTION PRN
Status: DISCONTINUED | OUTPATIENT
Start: 2022-06-16 | End: 2022-06-17 | Stop reason: HOSPADM

## 2022-06-16 RX ORDER — HEPARIN SODIUM (PORCINE) LOCK FLUSH IV SOLN 100 UNIT/ML 100 UNIT/ML
500 SOLUTION INTRAVENOUS PRN
Status: CANCELLED | OUTPATIENT
Start: 2022-06-23

## 2022-06-16 RX ADMIN — Medication 500 UNITS: at 13:16

## 2022-06-16 RX ADMIN — SODIUM CHLORIDE, PRESERVATIVE FREE 20 ML: 5 INJECTION INTRAVENOUS at 13:16

## 2022-06-16 NOTE — FLOWSHEET NOTE
Port flush is complete. Tolerated well. Left the unit via wheelchair. All equipment used in the care for this patient has been cleaned.

## 2022-06-16 NOTE — FLOWSHEET NOTE
Patient to the floor via wheelchair for her port flush. Vital signs taken. Denies any discomfort. Call light within reach.  at side.

## 2022-06-30 DIAGNOSIS — E78.00 PURE HYPERCHOLESTEROLEMIA: ICD-10-CM

## 2022-07-01 RX ORDER — ROSUVASTATIN CALCIUM 40 MG/1
TABLET, COATED ORAL
Qty: 90 TABLET | Refills: 0 | Status: SHIPPED | OUTPATIENT
Start: 2022-07-01 | End: 2022-09-27

## 2022-07-05 RX ORDER — CARVEDILOL 25 MG/1
TABLET ORAL
Qty: 270 TABLET | Refills: 0 | Status: SHIPPED | OUTPATIENT
Start: 2022-07-05 | End: 2022-09-29

## 2022-07-05 NOTE — TELEPHONE ENCOUNTER
Please approve or deny this refill request. The order is pended. Thank you.     LOV 04/28/2022    Next Visit Date:  Future Appointments   Date Time Provider Zaid Brayi   7/5/2022  2:30 PM [address]   7/13/2022 11:00 AM Navin Perez  [place] [id number]   7/18/2022  1:00 PM JOHNATHON Mendez - CNP [facility]   7/26/2022  1:00 PM Sreedhar Steen MD [facility]   8/23/2022  1:15 PM MD Zach Mitchell   10/12/2022  3:15 PM Rick Ruiz MD [facility]

## 2022-07-13 ENCOUNTER — TELEMEDICINE (OUTPATIENT)
Dept: FAMILY MEDICINE CLINIC | Age: 77
End: 2022-07-13
Payer: MEDICARE

## 2022-07-13 DIAGNOSIS — Z16.12 ESBL (EXTENDED SPECTRUM BETA-LACTAMASE) PRODUCING BACTERIA INFECTION: ICD-10-CM

## 2022-07-13 DIAGNOSIS — E11.59 TYPE 2 DIABETES MELLITUS WITH OTHER CIRCULATORY COMPLICATION, WITH LONG-TERM CURRENT USE OF INSULIN (HCC): ICD-10-CM

## 2022-07-13 DIAGNOSIS — F33.42 MAJOR DEPRESSIVE DISORDER, RECURRENT, IN FULL REMISSION (HCC): ICD-10-CM

## 2022-07-13 DIAGNOSIS — Z79.4 TYPE 2 DIABETES MELLITUS WITH OTHER CIRCULATORY COMPLICATION, WITH LONG-TERM CURRENT USE OF INSULIN (HCC): ICD-10-CM

## 2022-07-13 DIAGNOSIS — I63.9 CEREBROVASCULAR ACCIDENT (CVA), UNSPECIFIED MECHANISM (HCC): ICD-10-CM

## 2022-07-13 DIAGNOSIS — G40.909 SEIZURE DISORDER (HCC): ICD-10-CM

## 2022-07-13 DIAGNOSIS — H61.23 BILATERAL IMPACTED CERUMEN: Primary | ICD-10-CM

## 2022-07-13 DIAGNOSIS — I10 ESSENTIAL HYPERTENSION: ICD-10-CM

## 2022-07-13 DIAGNOSIS — I65.23 BILATERAL CAROTID ARTERY STENOSIS: ICD-10-CM

## 2022-07-13 DIAGNOSIS — A49.9 ESBL (EXTENDED SPECTRUM BETA-LACTAMASE) PRODUCING BACTERIA INFECTION: ICD-10-CM

## 2022-07-13 DIAGNOSIS — N18.4 CHRONIC KIDNEY DISEASE, STAGE 4 (SEVERE) (HCC): ICD-10-CM

## 2022-07-13 DIAGNOSIS — K92.2 GASTROINTESTINAL HEMORRHAGE, UNSPECIFIED GASTROINTESTINAL HEMORRHAGE TYPE: ICD-10-CM

## 2022-07-13 PROCEDURE — 99442 PR PHYS/QHP TELEPHONE EVALUATION 11-20 MIN: CPT | Performed by: FAMILY MEDICINE

## 2022-07-13 ASSESSMENT — ENCOUNTER SYMPTOMS
SHORTNESS OF BREATH: 0
FACIAL SWELLING: 0
CHEST TIGHTNESS: 0
ANAL BLEEDING: 0
EYE PAIN: 0

## 2022-07-13 NOTE — PROGRESS NOTES
2022    TELEHEALTH EVALUATION -- Audio/Visual (During [id number] public health emergency)    HPI:    Rachel Logan (:  1945) has requested an audio/video evaluation for the following concern(s):    Ear wax build up   Chronic   Was seen by another provider to have them cleaned        Seizures   Was seeing a neurologist   MRI was ordered -patient is willing to get the MRI done now. Will order back in October but patient is declining at that time. tati is taking the keppra   Per , patient and that has been hearing voices and seeing things. Has been going on for the last month. Was unsure if is related to dementia. F/u   Reports that she is talking gibberish   Comes and goes        Anemia  Mix of iron deficiency anemia and history of GI bleeds. Please read the last hospital encounter     Castillo Russell a 67 y. o. female that was admitted and treated at [facility] for increasing lethargy and fatigue in the setting of chronic GI bleed.  Patient has had multiple endoscopic evaluations in the past including a PillCam which showed some bleeding throughout the distal small intestine.  She has been treated also has advancing CKD 3 which is likely contributing to her anemia at some point as well.  She was recently admitted to [facility] with cardiac arrest and has been successfully discharged home.  At this point in time she was transfused to a hemoglobin of 9.1 and she will be discharged with hematology follow-up. [facility] line was placed as no one was able to place an IV in her while on admission this will be removed prior to discharge. Nilam David will follow up with hematology for hospital erythropoietin versus iron versus blood transfusions as needed.  She also follows with nephrology due to her CKD.  Of note the patient did have mild Trop elevation on arrival which is chronic and baseline and troponin's have been flat with no signs of acute coronary events.     Patient was seen by hematology. Marybel was told that it was too soon for patient to have any iron transfusion/blood transfusions since she just had one in the hospital. Patient was to have blood work done in may and then repeated 2 weeks afterwards. Family is concerned because  Patient has a history where her blood count drops every month due to the chronic bleed in her GI system and she needs to be admitted to the hospital.family would like to see if there is a way where her blood count can be checked monthly or where she can be evaluated more frequently through other measures. Per , patient was seen by palliative care last week. From their understanding, they would resume all care and will not need any help from the specialist.  Family wants all of the care teams involved. Patient denies any fevers, chills, nausea, vomiting, chest pain, change in her shortness of breath, change in her stools.      Follow-up  Patient is doing much better. Patient has her blood checked routinely. And has been established with hematology. Patient also follows up with gastroenterology. Per family, since she has been with palliative care she has been able to relate any concerns with them.     Follow  Family reports that her blood count had dropped when she was at her most recent appointment. Denies any symptoms or concerns at this time. But patient is worried that if it drops any further that she will end up in the hospital.  Patient does not see palliative medicine until 1 month from now.     F/u   Going through iron treatments        CKD   Renal standpoint doing well. Diabetes type 2   Working with endocrinology   Doing well has an increase in her insulin   Lab Results   Component Value Date    LABA1C 12.8 (H) 04/07/2022     No results found for: EAG         Colonization of ESBL E. coli     Patient was recently treated for urinary tract infection by her primary care provider.   At that time patient was given antibiotics and was told to have a repeat urinary culture for further evaluation. Patient also has chronic abdominal pain that was seen and had a CT scan in the hospital.  Patient is a follow-up with her hematologist in June.     Follow-up  Was evaluated by infectious disease. Stated that patient does not need any antibiotics or anything at this time as she is likely been colonized with the bacteria. Currently patient has no issues or concerns. Denies any fevers, chills, nausea, vomiting, chest pain, shortness of breath, abdominal pain, urination, changes in stools.      Follow  Stable.       Review of Systems   Constitutional: Negative for activity change, diaphoresis and fatigue. HENT: Negative for facial swelling and nosebleeds. Eyes: Negative for pain. Respiratory: Negative for chest tightness and shortness of breath. Gastrointestinal: Negative for anal bleeding. Endocrine: Negative for heat intolerance. Genitourinary: Negative for dysuria and pelvic pain. Musculoskeletal: Negative for myalgias. Allergic/Immunologic: Negative for immunocompromised state. Neurological: Negative for syncope and light-headedness. Psychiatric/Behavioral: Negative for behavioral problems and decreased concentration. Prior to Visit Medications    Medication Sig Taking?  Authorizing Provider   carvedilol (COREG) 25 MG tablet TAKE 1 & 1/2 (ONE & ONE-HALF) TABLETS BY MOUTH TWICE DAILY WITH MEALS Yes Tigre Lockett MD   rosuvastatin (CRESTOR) 40 MG tablet TAKE 1 TABLET BY MOUTH ONCE DAILY IN THE EVENING Yes Magan Riley MD   Continuous Blood Gluc Sensor (FREESTYLE MURALI 2 SENSOR) MISC 1 Device by Does not apply route every 14 days Yes DIANE Palma   Continuous Blood Gluc  (FREESTYLE MURALI 2 READER) [place] 1 Device by Does not apply route 4 times daily (before meals and nightly) Yes DIANE Palma   insulin glargine (LANTUS) 100 UNIT/ML injection vial INJECT 75 UNITS AT CNP   Cholecalciferol (VITAMIN D3) 50 MCG ( UT) CAPS Take 1,000 Units by mouth 2 times daily  Yes Historical Provider, MD   lidocaine-prilocaine (EMLA) 2.5-2.5 % cream APPLY CREAM TOPICALLY TO PORT SITE ONE HOUR PRIOR TO APPOINTMENT AS NEEDED Yes Historical Provider, MD   Handicap Placard MISC by Does not apply route Good from 7/15/21 - 7/15/26 Yes Bjorn Bowser MD   Accu-Chek Softclix Lancets MISC bid Yes Gabbi Paula MD   Blood Glucose Monitoring Suppl (ACCU-CHEK PHYLLIS CONNECT) w/Device KIT 1 kit by Does not apply route daily Yes Gabbi Paula MD       Social History     Tobacco Use    Smoking status: Former Smoker     Packs/day: 1.00     Years: 59.00     Pack years: 59.00     Types: Cigarettes     Start date: 2017     Quit date: 10/1/2020     Years since quittin.7    Smokeless tobacco: Never Used   Vaping Use    Vaping Use: Never used   Substance Use Topics    Alcohol use: Not Currently    Drug use: No        Allergies   Allergen Reactions    Ibuprofen Nausea Only    Metformin And Related      Diarrhea      Darvon [Propoxyphene Hcl] Nausea And Vomiting   ,   Past Medical History:   Diagnosis Date    Adenomatous polyp of sigmoid colon     Adenomatous polyp of transverse colon     Anxiety     Asthma     dx 2019 / has smoked since age 11    Brainstem stroke ([address]     Cardiopulmonary arrest ([address]     CHF (congestive heart failure) ([address]     Chronic back pain     Bilateral L5 S1 Radic on emg--surprisingly worse on the left than the right--pt's symptoms and her MRI show worse on the right    Chronic obstructive pulmonary disease with acute exacerbation ([address] 10/12/2019    Depression     ESBL E. coli carrier     Carrier.     Fibromyalgia     Gastrointestinal hemorrhage 2020    Hyperlipidemia     meds > 8 yrs    Hypertension     meds > 45 yrs    Insomnia 2013    On home O2     2l per n/c at bedtime mostly,     Osteoarthritis     Seizures ([address]     Sepsis ([address] 10/6/2020    Smoker 6/18/2013    Type II diabetes mellitus, uncontrolled ([address]     hx > 8 yrs    Unspecified sleep apnea    ,   Past Surgical History:   Procedure Laterality Date    BACK SURGERY  2017    lumbar disc    CARDIAC CATHETERIZATION  11/03/2014    DR. MIRELES / no stents    COLONOSCOPY  08/29/2016    w/polypectomy     COLONOSCOPY N/A 09/29/2020    COLONOSCOPY WITH POLYPECTOMY performed by Ju Shook MD at [facility] N/A 10/07/2019    EUA HYSTEROSCOPY DILATATION AND CURETTAGE performed by Luli Odom DO at [address] [place] [address] ENDOSCOPY, COLON, DIAGNOSTIC      EYE SURGERY      [place] with IOL OU / [address] [place]  [address]    umbilical hernia repair    IR PORT PLACEMENT EQUAL OR GREATER THAN 5 YEARS  5/14/2021    IR PORT PLACEMENT EQUAL OR GREATER THAN 5 YEARS 5/14/2021 MLO[facility] SPECIAL PROCEDURE    MA ESOPHAGOGASTRODUODENOSCOPY TRANSORAL DIAGNOSTIC N/A 03/24/2017    EGD ESOPHAGOGASTRODUODENOSCOPY performed by Azalia Halsted, MD at [place] [address] 02/08/2018    negative findings    MA REVISE MEDIAN N/CARPAL TUNNEL SURG Left 06/05/2017    LEFT  CARPAL TUNNEL RELEASE performed by Raguel Sandhoff[facility] TFEG,6+C/V,JIXSC N/A 02/08/2018    TONSILLECTOMY      as child    TUNNELED VENOUS PORT PLACEMENT Right 05/14/2021    8 Fr [employer] Port ClearVUE by Dr. Didier Casas  08/26/2016    w/bx     UPPER GASTROINTESTINAL ENDOSCOPY N/A 02/25/2020    EGD possible biopsy performed by Ju Shook MD at [address] [place] [address] 07/01/2020    EGD PUSH ENTEROSCOPY performed [facility] Goldie Bell MD at [facility]   ,   Social History     Tobacco Use    Smoking status: Former Smoker     Packs/day: 1.00     Years: 59.00     Pack years: 59.00     Types: Cigarettes     Start date: 11/30/2017     Quit date: 10/1/2020 Years since quittin.7    Smokeless tobacco: Never Used   Vaping Use    Vaping Use: Never used   Substance Use Topics    Alcohol use: Not Currently    Drug use: No   ,   Family History   Problem Relation Age of Onset    Heart Disease Father         cardiac bypass    Arthritis Father     Arthritis Mother     Other Mother          at age 80    Other [facility]         [facility]    No Known Problems Daughter     Stroke Son    ,   Immunization History   Administered Date(s) Administered    COVID-19, MODERNA BLUE border, Primary or Immunocompromised, (age 12y+), IM, 100 mcg/0.5mL 2021, 2021    Influenza Vaccine, unspecified formulation 2015    Influenza, High Dose (Fluzone 65 yrs and older) 10/12/2016, 10/23/2017    Influenza, High-dose, Quadv, 72 yrs +, IM (Fluzone) 2020, 2020, 10/01/2021, 10/01/2021    Influenza, Elyce [place], IM, (6 mo and older Fluzone, Flulaval, Fluarix and 3 yrs and older Afluria) 2015    Influenza, Quadv, IM, PF (6 mo and older Fluzone, Flulaval, Fluarix, and 3 yrs and older Afluria) 10/16/2019    Pneumococcal Conjugate 13-valent ([id number]) 10/23/2017    Pneumococcal Polysaccharide ([id number]) 2010, 2020    Td (Adult), 5 Lf Tetanus Toxoid, Pf (Tenivac, Decavac) 2011    Td, unspecified formulation 2011   ,   Health Maintenance   Topic Date Due    Shingles vaccine (1 of 2) Never done    DEXA (modify frequency per FRAX score)  Never done    DTaP/Tdap/Td vaccine (1 - Tdap) 2011    COVID-19 Vaccine (3 - Booster for Moderna series) 2022    Flu vaccine (1) 2022    Annual Wellness Visit (AWV)  2022    Lipids  2023    Depression Monitoring  2023    Colorectal Cancer Screen  2023    Pneumococcal 65+ years Vaccine  Completed    Hepatitis A vaccine  Aged Out    Hib vaccine  Aged Out    Meningococcal (ACWY) vaccine  Aged Out    Hepatitis C screen  Discontinued    Low dose CT lung screening  Discontinued       PHYSICAL EXAMINATION:  [ INSTRUCTIONS:  \"[x]\" Indicates a positive item  \"[]\" Indicates a negative item  -- DELETE ALL ITEMS NOT EXAMINED]  Vital Signs: (As obtained by patient/caregiver or practitioner observation)    Blood pressure-  Heart rate-    Respiratory rate-    Temperature-  Pulse oximetry-     Constitutional: [x] Appears well-developed and well-nourished [x] No apparent distress      [] Abnormal-   Mental status  [x] Alert and awake  [] Oriented to person/place/time []Able to follow commands      Eyes:  EOM    [x]  Normal  [] Abnormal-  Sclera  [x]  Normal  [] Abnormal -         Discharge []  None visible  [] Abnormal -    HENT:   [x] Normocephalic, atraumatic. [] Abnormal   [] Mouth/Throat: Mucous membranes are moist.     External Ears [x] Normal  [] Abnormal-     Neck: [x] No visualized mass     Pulmonary/Chest: [x] Respiratory effort normal.  [] No visualized signs of difficulty breathing or respiratory distress        [] Abnormal-      Musculoskeletal:   [x] Normal gait with no signs of ataxia         [] Normal range of motion of neck        [] Abnormal-       Neurological:        [x] No Facial Asymmetry (Cranial nerve 7 motor function) (limited exam to video visit)          [] No gaze palsy        [] Abnormal-         Skin:        [x] No significant exanthematous lesions or discoloration noted on facial skin         [] Abnormal-            Psychiatric:       [x] Normal Affect [] No Hallucinations        [] Abnormal-     Other pertinent observable physical exam findings-     ASSESSMENT/PLAN:    1. Bilateral impacted cerumen    - [facility] - Tom Arellano MD, Otolaryngology, [place]    2. Cerebrovascular accident (CVA), unspecified mechanism ([address]  Follow up with neurology     3. Seizure disorder ([address]  Stable. Seeing neurology     4.  Type 2 diabetes mellitus with other circulatory complication, with long-term current use of insulin (HCC)  Lab Results Component Value Date    LABA1C 12.8 (H) 04/07/2022     No results found for: EAG  Had some adjustments on her insulin     5. Bilateral carotid artery stenosis      6. Major depressive disorder, recurrent, in full remission ([facility]      7. Chronic kidney disease, stage 4 (severe) (HCC)      8. Essential hypertension      9. Gastrointestinal hemorrhage, unspecified gastrointestinal hemorrhage type      10. ESBL (extended spectrum beta-lactamase) producing bacteria infection  F/u with urology. No follow-ups on file. Chyna Coronado, was evaluated through a synchronous (real-time) audio-video encounter. The patient (or guardian if applicable) is aware that this is a billable service, which includes applicable co-pays. This Virtual Visit was conducted with patient's (and/or legal guardian's) consent. The visit was conducted pursuant to the emergency declaration under the [address] [place] [address] authority and the [employer] and [employer] General Act. Patient identification was verified, and a caregiver was present when appropriate. The patient was located at home in a state where the provider was licensed to provide care. Total time spent on this encounter: 15 minutes    --Jose Miguel Yeh MD on 7/13/2022 at 12:18 PM    An electronic signature was used to authenticate this note.

## 2022-07-14 ENCOUNTER — OFFICE VISIT (OUTPATIENT)
Dept: ENDOCRINOLOGY | Age: 77
End: 2022-07-14
Payer: MEDICARE

## 2022-07-14 VITALS
SYSTOLIC BLOOD PRESSURE: 113 MMHG | OXYGEN SATURATION: 97 % | HEIGHT: 59 IN | HEART RATE: 74 BPM | BODY MASS INDEX: 29.43 KG/M2 | WEIGHT: 146 LBS | DIASTOLIC BLOOD PRESSURE: 69 MMHG

## 2022-07-14 DIAGNOSIS — E11.69 TYPE 2 DIABETES MELLITUS WITH OTHER SPECIFIED COMPLICATION, WITH LONG-TERM CURRENT USE OF INSULIN (HCC): Primary | ICD-10-CM

## 2022-07-14 DIAGNOSIS — Z79.4 TYPE 2 DIABETES MELLITUS WITH OTHER SPECIFIED COMPLICATION, WITH LONG-TERM CURRENT USE OF INSULIN (HCC): Primary | ICD-10-CM

## 2022-07-14 LAB
CHP ED QC CHECK: ABNORMAL
CHP ED QC CHECK: ABNORMAL
GLUCOSE BLD-MCNC: 192 MG/DL
GLUCOSE BLD-MCNC: 240 MG/DL

## 2022-07-14 PROCEDURE — 82962 GLUCOSE BLOOD TEST: CPT | Performed by: PHYSICIAN ASSISTANT

## 2022-07-14 PROCEDURE — 1123F ACP DISCUSS/DSCN MKR DOCD: CPT | Performed by: PHYSICIAN ASSISTANT

## 2022-07-14 PROCEDURE — 99214 OFFICE O/P EST MOD 30 MIN: CPT | Performed by: PHYSICIAN ASSISTANT

## 2022-07-14 PROCEDURE — 3046F HEMOGLOBIN A1C LEVEL >9.0%: CPT | Performed by: PHYSICIAN ASSISTANT

## 2022-07-14 ASSESSMENT — ENCOUNTER SYMPTOMS
EYE REDNESS: 0
NAUSEA: 0
RHINORRHEA: 0
DIARRHEA: 0
SORE THROAT: 0
COUGH: 0
VOMITING: 0
WHEEZING: 0
EYE PAIN: 0
SHORTNESS OF BREATH: 0
ABDOMINAL PAIN: 0
SINUS PRESSURE: 0

## 2022-07-14 NOTE — PROGRESS NOTES
7/14/2022    Assessment:       Diagnosis Orders   1. Type 2 diabetes mellitus with other specified complication, with long-term current use of insulin ([facility])  POCT Glucose    Comprehensive Metabolic Panel    Hemoglobin A1C     AVG am glucose range 250-300    PLAN:     1. Continue Lantus inject 75 units at bedtime 11:00 PM   2. Increase Lantus 30 units in the morning  11:00 AM  3. Increase Humalog 26 units before Breakfast and lunch ( first meal 12-2) , 26 units mid-day snack, Increase 38 units before dinner (third meal 6-8)  4. Check blood glucose 4 times daily and document  5. Freestyle Marilin [facility]   6. Follow up in 2 months    Orders Placed This Encounter   Procedures    Comprehensive Metabolic Panel     Standing Status:   Future     Standing Expiration Date:   7/14/2023    Hemoglobin A1C     Standing Status:   Future     Standing Expiration Date:   7/14/2023    POCT Glucose     No orders of the defined types were placed in this encounter. Return in about 2 months (around 9/14/2022) for Diabetes. Subjective:     Chief Complaint   Patient presents with    Follow-up    Diabetes     Vitals:    07/14/22 1334   BP: 113/69   Pulse: 74   SpO2: 97%   Weight: 146 lb (66.2 kg)   Height: 4' 11\" (1.499 m)     Wt Readings from Last 3 Encounters:   07/14/22 146 lb (66.2 kg)   06/06/22 144 lb (65.3 kg)   05/23/22 148 lb 14.4 oz (67.5 kg)     BP Readings from Last 3 Encounters:   07/14/22 113/69   06/16/22 (!) 142/59   06/06/22 132/73     Patient 65-year-old type II diabetic female with worsening glycemic control. Gely Gilbert was using the Dexcom G6 however she had some skin breakdown on her abdomen from it and has discontinued it. She does continue to take her insulin as prescribed however her blood glycemic control is still poor. She is accompanied by her  and daughter today.   They are both talking with all of us regarding her poor dietary intake, we came up with strategies for sugar-free beverages to drink and different types of snacks. Nutritional information booklet was given to her as well. I am going to order and she is willing to try the freestyle roderick 2 system. Instructed her to let me know right away if she had any skin issues on her arm. I have increased her insulin dosing, added Lantus in the morning, will follow up with her monthly until we improve her glycemic control. I reviewed all her laboratory studies with her and the patient discussed her worsening renal function and the possible need for dialysis if we do not improve her glycemic control, she verbalized understanding. Diabetes  She presents for her follow-up diabetic visit. She has type 2 diabetes mellitus. The initial diagnosis of diabetes was made 30 years ago. Her disease course has been worsening. Pertinent negatives for hypoglycemia include no dizziness or headaches. Associated symptoms include fatigue, polydipsia (improving) and polyuria. Pertinent negatives for diabetes include no chest pain. Hypoglycemia complications include hospitalization, required assistance and required glucagon injection. Symptoms are stable. Diabetic complications include heart disease. Pertinent negatives for diabetic complications include no CVA. Risk factors for coronary artery disease include diabetes mellitus and dyslipidemia. Current diabetic treatment includes insulin injections. She is compliant with treatment all of the time. She is currently taking insulin pre-breakfast, pre-lunch, pre-dinner and at bedtime. Insulin injections are given by patient and adult caretaker. Rotation sites for injection include the abdominal wall and arms. She is following a generally healthy diet. When asked about meal planning, she reported none. She never participates in exercise. Her home blood glucose trend is increasing steadily. Her overall blood glucose range is >200 mg/dl. An ACE inhibitor/angiotensin II receptor blocker is being taken.  She does not see a podiatrist.Eye exam is current. Past Medical History:   Diagnosis Date    Adenomatous polyp of sigmoid colon     Adenomatous polyp of transverse colon     Anxiety     Asthma     dx 2019 / has smoked since age 11    Brainstem stroke ([address]     Cardiopulmonary arrest ([address]     CHF (congestive heart failure) (HCC)     Chronic back pain     Bilateral L5 S1 Radic on emg--surprisingly worse on the left than the right--pt's symptoms and her MRI show worse on the right    Chronic obstructive pulmonary disease with acute exacerbation ([address] 10/12/2019    Depression     ESBL E. coli carrier     Carrier.  Fibromyalgia     Gastrointestinal hemorrhage 2/24/2020    Hyperlipidemia     meds > 8 yrs    Hypertension     meds > 45 yrs    Insomnia 12/4/2013    On home O2     2l per n/c at bedtime mostly,     Osteoarthritis     Seizures ([address]     Sepsis ([address] 10/6/2020    Smoker 6/18/2013    Type II diabetes mellitus, uncontrolled ([address]     hx > 8 yrs    Unspecified sleep apnea      Past Surgical History:   Procedure Laterality Date    BACK SURGERY  2017    lumbar disc    CARDIAC CATHETERIZATION  11/03/2014    DR. MIRELES / no stents    COLONOSCOPY  08/29/2016    w/polypectomy     COLONOSCOPY N/A 09/29/2020    COLONOSCOPY WITH POLYPECTOMY [facility] N/A 10/07/2019    EUA HYSTEROSCOPY DILATATION AND CURETTAGE performed by Idelia Angelucci, DO at  [facility], COLON, DIAGNOSTIC      EYE SURGERY      [place] with IOL OU / [address]    umbilical hernia repair    IR PORT PLACEMENT EQUAL OR GREATER THAN 5 YEARS  5/14/2021    IR PORT PLACEMENT EQUAL OR GREATER THAN 5 YEARS 5/14/2021 [facility] SPECIAL PROCEDURE    DC ESOPHAGOGASTRODUODENOSCOPY TRANSORAL DIAGNOSTIC N/A 03/24/2017    EGD ESOPHAGOGASTRODUODENOSCOPY performed [facility] Ashley Lopez [facility] at [facility] N/A 2018    negative findings    MI REVISE MEDIAN N/CARPAL TUNNEL SURG Left 2017    LEFT  CARPAL TUNNEL RELEASE performed by Kameron Guo MD at [facility],3+S/G,MZQTP N/A 2018    TONSILLECTOMY      as child    TUNNELED VENOUS PORT PLACEMENT Right 2021    [employer] [address] [employer] Port ClearVUE by Dr. Pau Stephenson  2016    w/bx     UPPER GASTROINTESTINAL ENDOSCOPY N/A 2020    EGD possible biopsy performed by Anjana Mayer MD at [address] N/A 2020    EGD PUSH ENTEROSCOPY performed by Fab[facility] Vee [facility] at [facility] Marital status:      Spouse name: Hodan Rivera Number of children: 2    Years of education: 12    Highest education level: High school graduate   Occupational History    Occupation: Retired-   Tobacco Use    Smoking status: Former Smoker     Packs/day: 1.00     Years: 59.00     Pack years: 59.00     Types: Cigarettes     Start date: 2017     Quit date: 10/1/2020     Years since quittin.7    Smokeless tobacco: Never Used   Vaping Use    Vaping Use: Never used   Substance and Sexual Activity    Alcohol use: Not Currently    Drug use: No    Sexual activity: Yes     Partners: Male   Other Topics Concern    Not on file   Social History Narrative    Grew up in [address] [place] [address] With:  Librado Spouse    Type of Home: House    Home Layout: Two level, Performs ADL's on one level    Home Access: Stairs to enter with rails    Entrance Stairs - Number of Steps: 4    Bathroom Shower/Tub: Tub/Shower unit, Doors    Bathroom Equipment: Shower chair, Grab bars in [address]: Rolling walker, Cane, Oxygen(uses her O2 very seldom)    ADL Assistance: Needs assistance    Homemaking Assistance: Needs assistance    Homemaking Responsibilities: No(spouse performs)    Ambulation Assistance: Independent    Transfer Assistance: Independent    Active : No    Occupation: Retired    Type of occupation: worked in [employer]: patient enjoys [address] [facility] Strain: Low Risk     Difficulty of Paying Living Expenses: Not hard at [employer] Insecurity: No Food Insecurity    Worried About [facility] in the Last Year: Never true    [address] in the Last Year: Never true   Transportation Needs:     Lack of Transportation (Medical): Not on file    Lack of Transportation (Non-Medical):  Not on file   Physical Activity:     Days of Exercise per Week: Not on file    Minutes of Exercise per Session: Not on file   Stress:     Feeling of Stress : Not on file   Social Connections:     Frequency of Communication with Friends and Family: Not on file    Frequency of Social Gatherings with Friends and Family: Not on file    Attends Taoist Services: Not on file    Active Member of [address] [place] [address] or Organizations: Not on file    Attends Club or Organization Meetings: Not on file    Marital Status: Not on file   Intimate Partner Violence:     Fear of Current or Ex-Partner: Not on file    Emotionally Abused: Not on file    Physically Abused: Not on file    Sexually Abused: Not on file   Housing Stability:     Unable to Pay for Housing in the Last Year: Not on file    Number of [place] in the Last Year: Not on file    Unstable Housing in the Last Year: Not on file     Family History   Problem Relation Age of Onset    Heart Disease Father         cardiac bypass    Arthritis Father     Arthritis Mother     Other Mother          at age 80    Other Sister         [facility]    No Known Problems Daughter     Stroke Son      Allergies   Allergen Reactions    Ibuprofen Nausea Only    Metformin And Related      Diarrhea      Darvon [Propoxyphene Hcl] Nausea And Vomiting       Current Outpatient Medications:     carvedilol (COREG) 25 MG tablet, TAKE 1 & 1/2 (ONE & ONE-HALF) TABLETS BY MOUTH TWICE DAILY WITH MEALS, Disp: 270 tablet, Rfl: 0    rosuvastatin (CRESTOR) 40 MG tablet, TAKE 1 TABLET BY MOUTH ONCE DAILY IN THE EVENING, Disp: 90 tablet, Rfl: 0    insulin glargine (LANTUS) 100 UNIT/ML injection vial, INJECT 75 UNITS AT BEDTIME, 20 UNITS IN THE MORNING  .  E11.69, Disp: 40 mL, Rfl: 3    insulin lispro (HUMALOG) 100 UNIT/ML SOLN injection vial, INJECT 3 TIMES DAILY- 24 units before lunch, 24 units mid-day snack, Increase 36 units before dinner, Disp: 10 mL, Rfl: 1    ACCU-CHEK PHYLLIS PLUS strip, Test 3x daily Dx E11.65, Disp: 100 each, Rfl: 3    fluocinonide (LIDEX) 0.05 % external solution, Apply topically 2 times daily. , Disp: 60 mL, Rfl: 0    dextromethorphan-quiNIDine (NUEDEXTA) 20-10 MG CAPS per capsule, Take 1 capsule by mouth daily, Disp: 30 capsule, Rfl: 3    nitrofurantoin (MACRODANTIN) 50 MG capsule, One po daily, Disp: 90 capsule, Rfl: 3    ferrous sulfate (IRON 325) 325 (65 Fe) MG tablet, Take 1 tablet by mouth every other day, Disp: 30 tablet, Rfl: 3    PARoxetine (PAXIL) 40 MG tablet, TAKE 1 TABLET BY MOUTH ONCE DAILY IN THE MORNING (Patient taking differently: at bedtime TAKE 1 TABLET BY MOUTH ONCE DAILY IN THE EVENING), Disp: 90 tablet, Rfl: 1    amLODIPine (NORVASC) 5 MG tablet, Take 1 tablet by mouth daily, Disp: 90 tablet, Rfl: 3    glucose 4 g chewable tablet, Take 4 tablets by mouth as needed for Low blood sugar, Disp: 60 tablet, Rfl: 3    Glucagon (GVOKE HYPOPEN 2-PACK) 1 MG/0.2ML SOAJ, Inject 1 mg into the skin every 15 minutes as needed (glucose less tahtn 70 or if symptomatic), Disp: 2 each, Rfl: 3    digoxin (LANOXIN) 125 MCG tablet, Take 1 tablet by mouth every other day, Disp: 45 tablet, Rfl: 3    albuterol-ipratropium (COMBIVENT RESPIMAT)  MCG/ACT AERS inhaler, Inhale 1 puff into the lungs every 6 hours as needed for Wheezing or Shortness of Breath, Disp: 4 g, Rfl: 5    ipratropium-albuterol (DUONEB) 0.5-2.5 (3) MG/3ML SOLN nebulizer solution, Inhale 3 mLs into the lungs 3 times daily, Disp: 360 mL, Rfl: 0    Cranberry 500 MG CAPS, Take 500 mg by mouth daily, Disp: , Rfl:     pantoprazole (PROTONIX) 40 MG tablet, Take 1 tablet by mouth every morning (before breakfast), Disp: 90 tablet, Rfl: 3    Cholecalciferol (VITAMIN D3) 50 MCG (2000 UT) CAPS, Take 1,000 Units by mouth 2 times daily , Disp: , Rfl:     lidocaine-prilocaine (EMLA) 2.5-2.5 % cream, APPLY CREAM TOPICALLY TO PORT SITE ONE HOUR PRIOR TO APPOINTMENT AS NEEDED, Disp: , Rfl:     Handicap Placard MISC, by Does not apply route Good from 7/15/21 - 7/15/26, Disp: 1 each, Rfl: 0    Accu-Chek Softclix Lancets MISC, bid, Disp: 100 each, Rfl: 3    Blood Glucose Monitoring Suppl (ACCU-CHEK PHYLLIS CONNECT) w/Device KIT, 1 kit by Does not apply route daily, Disp: 1 kit, Rfl: 0    Continuous Blood Gluc Sensor (FREESTYLE MURALI 2 SENSOR) MISC, 1 Device by Does not apply route every 14 days, Disp: 2 each, Rfl: 3    Continuous Blood Gluc  (FREESTYLE MURALI 2 READER) [place], 1 Device by Does not apply route 4 times daily (before meals and nightly), Disp: 1 each, Rfl: 0  Lab Results   Component Value Date     05/18/2022    K 4.4 05/18/2022     05/18/2022    CO2 19 (L) 05/18/2022    BUN 28 (H) 05/18/2022    CREATININE 1.89 (H) 05/18/2022    GLUCOSE 240 (H) 07/14/2022    CALCIUM 9.6 05/18/2022    PROT 6.8 05/18/2022    LABALBU 4.0 05/18/2022    BILITOT 0.3 05/18/2022    ALKPHOS 115 05/18/2022    AST 18 05/18/2022    ALT 17 05/18/2022    LABGLOM 25.7 (L) 05/18/2022    GFRAA 31.1 (L) 05/18/2022    GLOB 2.8 05/18/2022     Lab Results   Component Value Date    WBC 6.9 05/18/2022    HGB 10.8 (L) 05/18/2022    HCT 33.9 (L) 05/18/2022    MCV 85.9 05/18/2022     (H) 05/18/2022     Lab Results   Component Value Date    LABA1C 12.8 (H) 04/07/2022    LABA1C 11.4 12/09/2021    LABA1C 7.5 (H) 04/29/2021 Lab Results   Component Value Date    HDL 33 (L) 04/07/2022    HDL 61 (H) 11/30/2020    HDL 42 02/24/2020    LDLCALC 36 04/07/2022    LDLCALC 69 11/30/2020    LDLCALC 50 02/24/2020    CHOL 106 04/07/2022    CHOL 143 11/30/2020    CHOL 105 02/24/2020    TRIG 183 (H) 04/07/2022    TRIG 64 11/30/2020    TRIG 67 02/24/2020     No results found for: TESTM  Lab Results   Component Value Date    TSH 0.474 11/30/2020    TSH 1.070 10/05/2020    TSH 1.200 10/13/2019    TSHREFLEX 0.986 05/18/2022    TSHREFLEX 0.777 08/27/2020     No results found for: TPOABS    Review of Systems   Constitutional: Positive for fatigue. Negative for chills and fever. HENT: Negative for congestion, ear pain, postnasal drip, rhinorrhea, sinus pressure and sore throat. Eyes: Negative for pain and redness. Respiratory: Negative for cough, shortness of breath and wheezing. Cardiovascular: Negative for chest pain, palpitations and leg swelling. Gastrointestinal: Negative for abdominal pain, diarrhea, nausea and vomiting. Endocrine: Positive for polydipsia (improving) and polyuria. Genitourinary: Negative for difficulty urinating. Musculoskeletal: Negative for arthralgias. Skin: Negative for rash. Allergic/Immunologic: Negative for environmental allergies. Neurological: Negative for dizziness and headaches. Hematological: Negative for adenopathy. Psychiatric/Behavioral: Negative for dysphoric mood. Objective:   Physical Exam  Constitutional:       General: She is not in acute distress. Appearance: She is not ill-appearing. HENT:      Head: Normocephalic. Nose: No congestion. Pulmonary:      Effort: Pulmonary effort is normal.   Musculoskeletal:      Cervical back: Normal range of motion. Neurological:      Mental Status: She is alert and oriented to person, place, and time. Psychiatric:         Mood and Affect: Mood normal.         Behavior: Behavior normal.         Thought Content:  Thought content normal.

## 2022-07-14 NOTE — PATIENT INSTRUCTIONS
Endocrinology-    1. Check your blood sugars 4 times a day, before meals and at night  2. Document these numbers in a blood glucose log and bring them with you to your follow-up appointment. 3. If you are prescribed insulin, Do not take your mealtime insulin if your blood sugars less than 120   4. Call our office if you have blood sugars less than 80 or greater then 300 on two or more occasions  5. Call our office if you have any questions regarding your blood sugars or insulin dosing regiment  6. Signs of low blood sugar may include tremors, feeling shaky, sweating, dizziness, confusion and weakness. Check your blood sugar immediatly if you have any of these symptoms. The plan as discussed at your appointment-   1. Continue Lantus inject 75 units at bedtime 11:00 PM   2. Increase Lantus 30 units in the morning  11:00 AM  3. Increase Humalog 26 units before Breakfast and lunch ( first meal 12-2) , 26 units mid-day snack, Increase 38 units before dinner (third meal 6-8)  4. Check blood glucose 4 times daily and document  5. Freestyle Marilin ordered- [address]   6.  Follow up in 2 months

## 2022-07-15 LAB
AVERAGE GLUCOSE: ABNORMAL
HBA1C MFR BLD: 8.5 %

## 2022-07-20 ENCOUNTER — OFFICE VISIT (OUTPATIENT)
Dept: PALLATIVE CARE | Age: 77
End: 2022-07-20

## 2022-07-20 VITALS
SYSTOLIC BLOOD PRESSURE: 128 MMHG | OXYGEN SATURATION: 97 % | RESPIRATION RATE: 16 BRPM | DIASTOLIC BLOOD PRESSURE: 68 MMHG | TEMPERATURE: 100.6 F | HEART RATE: 75 BPM

## 2022-07-20 DIAGNOSIS — R39.9 UTI SYMPTOMS: Primary | ICD-10-CM

## 2022-07-20 DIAGNOSIS — R53.82 CHRONIC FATIGUE: ICD-10-CM

## 2022-07-20 DIAGNOSIS — R73.9 HYPERGLYCEMIA: ICD-10-CM

## 2022-07-20 DIAGNOSIS — Z51.5 PALLIATIVE CARE ENCOUNTER: ICD-10-CM

## 2022-07-20 DIAGNOSIS — R31.29 OTHER MICROSCOPIC HEMATURIA: ICD-10-CM

## 2022-07-20 DIAGNOSIS — N39.0 CHRONIC UTI: ICD-10-CM

## 2022-07-20 DIAGNOSIS — N18.9 CHRONIC KIDNEY DISEASE, UNSPECIFIED CKD STAGE: ICD-10-CM

## 2022-07-20 DIAGNOSIS — R45.86 EMOTIONAL LABILITY: ICD-10-CM

## 2022-07-20 DIAGNOSIS — I50.42 CHRONIC COMBINED SYSTOLIC AND DIASTOLIC CHF (CONGESTIVE HEART FAILURE) (HCC): ICD-10-CM

## 2022-07-20 DIAGNOSIS — D64.9 ANEMIA, UNSPECIFIED TYPE: ICD-10-CM

## 2022-07-20 DIAGNOSIS — J44.9 CHRONIC OBSTRUCTIVE PULMONARY DISEASE, UNSPECIFIED COPD TYPE (HCC): ICD-10-CM

## 2022-07-20 DIAGNOSIS — G47.9 SLEEP DISTURBANCE: ICD-10-CM

## 2022-07-20 ASSESSMENT — ENCOUNTER SYMPTOMS
ABDOMINAL PAIN: 0
CONSTIPATION: 0
WHEEZING: 0
BACK PAIN: 0
TROUBLE SWALLOWING: 0
COUGH: 0
CHOKING: 0
COLOR CHANGE: 0
NAUSEA: 0
SHORTNESS OF BREATH: 1
DIARRHEA: 0
BLOOD IN STOOL: 0
VOMITING: 0

## 2022-07-20 NOTE — PROGRESS NOTES
Subjective:      Patient Id: Tori Martins was seen at  home in [facility]  for palliative medicine follow-up. She was accompanied to the appointment by: spouse, [address] [place] [address]    Chief Complaint   Patient presents with    Follow-up     Uti symptoms- frequency, burning, fatigue, intermittent flank pain      HPI       Samara Denton is a 67 y.o. female with a complex medical history that includes GI bleeding throughout the distal small intestine, anxiety, asthma, CHF, COPD, CKD depression, fibromyalgia, OA, cardiac arrest, seizures, DM II, and sleep apnea. Home visit is necessary in lieu of office due to significant frailty and high symptom burden from comorbid illnesses. Since prior visit, patient changed endocrinologists from Dr. Heather Esteves to Dr. Steven León. Her blood sugars have remained below 300 with titration of insulins. Patient feels so much better, more awake and alert, less fatigue. Spouse is also happy with recent changes. Last visit patient wondering if her thyroid was enlarged. Per Dr. Steven León, her thyroid appeared normal.       We are sitting outside during visit. Patient remains A+Ox4. In NAD. She does have a temp of 100.6 F this visit, but we are sitting outside and temperature outdoors in 90 degrees. Patient and spouse are concerned she has another UTI, despite being on Nitrofurantoin 50 mg daily, as prescribed by urologist Dr. Monique Ayers. She is having frequency, burning, and intermittent flank pain. On assessment she denies pain. Has a f/u with Dr. Monique Ayers 8/26. Pt remains steady with her gait. Occasional SOB on exertion. Previously discussed pulmonary referral, but patient does not want another specialist to see at this time. No leg swelling/fluid retention. No chest pain. Pt continues to follow with [facility] that provide in home emergency care. Last visit they were wondering if they provide lab services.  I spoke with them and [facility] reported if they feel the patient needs emergent labs, then they will call out to another lab service to come to the patient's home to draw the labs. I updated the family on this. Patient with intermittent \"crying spells. \" Her daughter wanted her to try another antidepressant, but patient is reluctant to try at this time. She does not feel her Paxil is helping her. I offered to wean off medication and try something new, patient declines. Patient continues to have ear drainage. A referral to Dr. Lucien Foley was placed. I recommended following up with him, they reported they will call him today to schedule appt. No updated labs or imaging since prior visit. Past Medical History:   Diagnosis Date    Adenomatous polyp of sigmoid colon     Adenomatous polyp of transverse colon     Anxiety     Asthma     dx 2019 / has smoked since age 11    Brainstem stroke ([address]     Cardiopulmonary arrest ([address]     CH[facility] (congestive heart failure) ([facility]     Chronic back pain     Bilateral L5 S1 Radic on emg--surprisingly worse on the left than the right--pt's symptoms and her MRI show worse on the right    Chronic obstructive pulmonary disease with acute exacerbation ([address] 10/12/2019    Depression     ESBL E. coli carrier     Carrier. Fibromyalgia     Gastrointestinal hemorrhage 2/24/2020    Hyperlipidemia     meds > 8 yrs    Hypertension     meds > 45 yrs    Insomnia 12/4/2013    On home O2     2l per n/c at bedtime mostly,     Osteoarthritis     Seizures ([address]     Sepsis ([address] 10/6/2020    Smoker 6/18/2013    Type II diabetes mellitus, uncontrolled ([address]     hx > 8 yrs    Unspecified sleep apnea      Past Surgical History:   Procedure Laterality Date    BACK SURGERY  2017    lumbar disc    CARDIAC CATHETERIZATION  11/03/2014    DR. MIRELES / no stents    COLONOSCOPY  08/29/2016    w/polypectomy     COLONOSCOPY N/A 09/29/2020    COLONOSCOPY WITH POLYPECTOMY performed by Shamika Barroso MD at [facility] 10/07/2019    EUA HYSTEROSCOPY DILATATION AND CURETTAGE performed by Ally Devi DO at [address], COLON, DIAGNOSTIC      EYE SURGERY      [place] with IOL OU / [address]  [address]    umbilical hernia repair    IR PORT PLACEMENT EQUAL OR GREATER THAN 5 YEARS  2021    IR PORT PLACEMENT EQUAL OR GREATER THAN 5 YEARS 2021 [facility] SPECIAL PROCEDURE    MI ESOPHAGOGASTRODUODENOSCOPY TRANSORAL DIAGNOSTIC N/A 2017    EGD ESOPHAGOGASTRODUODENOSCOPY performed by Teodora Jenkins MD at [place] [address] 2018    negative findings    MI REVISE MEDIAN N/CARPAL TUNNEL SURG Left 2017    LEFT  CARPAL TUNNEL RELEASE performed by Mason Bartlett MD at [address] [facility],TROAP N/A 2018    TONSILLECTOMY      as child    TUNNELED VENOUS PORT PLACEMENT Right 2021    8 Fr [employer] Port ClearVUE by Dr. Bre Sarmiento  2016    w/bx     UPPER GASTROINTESTINAL ENDOSCOPY N/A 2020    EGD possible biopsy performed by Gonzalo Faustin MD at [address] [facility] N/A 2020    EGD PUSH ENTEROSCOPY performed by Nikita Combs MD at [facility] History    Marital status:      Spouse name: Angi Mann    Number of children: 2    Years of education: 12    Highest education level: High school graduate   Occupational History    Occupation: Retired-   Tobacco Use    Smoking status: Former     Packs/day: 1.00     Years: 59.00     Pack years: 59.00     Types: Cigarettes     Start date: 2017     Quit date: 10/1/2020     Years since quittin.8    Smokeless tobacco: Never   Vaping Use    Vaping Use: Never used   Substance and Sexual Activity    Alcohol use: Not Currently    Drug use: No    Sexual activity: Yes     Partners: Male   Other Topics Concern    Not on file   Social History Narrative TAKE 1 TABLET BY MOUTH ONCE DAILY IN THE EVENING 90 tablet 0    Continuous Blood Gluc Sensor (FREESTYLE MURALI 2 SENSOR) MISC 1 Device by Does not apply route every 14 days 2 each 3    Continuous Blood Gluc  (FREESTYLE MURALI 2 READER) [place] 1 Device by Does not apply route 4 times daily (before meals and nightly) 1 each 0    insulin glargine (LANTUS) 100 UNIT/ML injection vial INJECT 75 UNITS AT BEDTIME, 20 UNITS IN THE MORNING  .  E11.69 40 mL 3    insulin lispro (HUMALOG) 100 UNIT/ML SOLN injection vial INJECT 3 TIMES DAILY- 24 units before lunch, 24 units mid-day snack, Increase 36 units before dinner 10 mL 1    ACCU-CHEK PHYLLIS PLUS strip Test 3x daily Dx E11.65 100 each 3    fluocinonide (LIDEX) 0.05 % external solution Apply topically 2 times daily.  60 mL 0    dextromethorphan-quiNIDine (NUEDEXTA) 20-10 MG CAPS per capsule Take 1 capsule by mouth daily 30 capsule 3    ferrous sulfate (IRON 325) 325 (65 Fe) MG tablet Take 1 tablet by mouth every other day 30 tablet 3    PARoxetine (PAXIL) 40 MG tablet TAKE 1 TABLET BY MOUTH ONCE DAILY IN THE MORNING (Patient taking differently: at bedtime TAKE 1 TABLET BY MOUTH ONCE DAILY IN THE EVENING) 90 tablet 1    amLODIPine (NORVASC) 5 MG tablet Take 1 tablet by mouth daily 90 tablet 3    glucose 4 g chewable tablet Take 4 tablets by mouth as needed for Low blood sugar 60 tablet 3    Glucagon (GVOKE HYPOPEN 2-PACK) 1 MG/0.2ML SOAJ Inject 1 mg into the skin every 15 minutes as needed (glucose less tahtn 70 or if symptomatic) 2 each 3    digoxin (LANOXIN) 125 MCG tablet Take 1 tablet by mouth every other day 45 tablet 3    albuterol-ipratropium (COMBIVENT RESPIMAT)  MCG/ACT AERS inhaler Inhale 1 puff into the lungs every 6 hours as needed for Wheezing or Shortness of Breath 4 g 5    ipratropium-albuterol (DUONEB) 0.5-2.5 (3) MG/3ML SOLN nebulizer solution Inhale 3 mLs into the lungs 3 times daily 360 mL 0    Cranberry 500 MG CAPS Take 500 mg by mouth daily pantoprazole (PROTONIX) 40 MG tablet Take 1 tablet by mouth every morning (before breakfast) 90 tablet 3    Cholecalciferol (VITAMIN D3) 50 MCG (2000 UT) CAPS Take 1,000 Units by mouth 2 times daily       lidocaine-prilocaine (EMLA) 2.5-2.5 % cream APPLY CREAM TOPICALLY TO PORT SITE ONE HOUR PRIOR TO APPOINTMENT AS NEEDED      Handicap Placard MISC by Does not apply route Good from 7/15/21 - 7/15/26 1 each 0    Accu-Chek Softclix Lancets MISC bid 100 each 3    Blood Glucose Monitoring Suppl (ACCU-CHEK PHYLLIS CONNECT) w/Device KIT 1 kit by Does not apply route daily 1 kit 0     No current facility-administered medications on file prior to visit. Review of Systems   Constitutional:  Positive for fatigue. Negative for activity change, appetite change and unexpected weight change. HENT:  Positive for ear discharge. Negative for hearing loss and trouble swallowing. Respiratory:  Positive for shortness of breath (with exertion, chronic). Negative for cough, choking and wheezing. Cardiovascular:  Negative for chest pain, palpitations and leg swelling. Gastrointestinal:  Negative for abdominal pain, blood in stool, constipation, diarrhea, nausea and vomiting. Genitourinary:  Positive for dysuria, flank pain (intermittent, none this visit) and frequency. Musculoskeletal:  Negative for arthralgias, back pain, gait problem and myalgias. Skin:  Negative for color change, rash and wound. Neurological:  Positive for weakness. Negative for dizziness, tremors, speech difficulty and light-headedness. Psychiatric/Behavioral:  Positive for dysphoric mood (intermittent crying spells). Negative for confusion and sleep disturbance. The patient is not nervous/anxious.         Objective:   /68   Pulse 75   Temp (!) 100.6 °F (38.1 °C)   Resp 16   LMP  (LMP Unknown)   SpO2 97%    Wt Readings from Last 3 Encounters:   07/14/22 146 lb (66.2 kg)   06/06/22 144 lb (65.3 kg)   05/23/22 148 lb 14.4 oz (67.5 kg) Physical Exam  Constitutional:       General: She is not in acute distress. HENT:      Head: Normocephalic and atraumatic. Cardiovascular:      Rate and Rhythm: Normal rate and regular rhythm. Pulses: Normal pulses. Pulmonary:      Effort: Pulmonary effort is normal.      Breath sounds: Normal breath sounds. No wheezing or rhonchi. Abdominal:      General: Bowel sounds are normal.      Palpations: Abdomen is soft. Tenderness: There is no abdominal tenderness. Musculoskeletal:      Right lower leg: No edema. Left lower leg: No edema. Skin:     General: Skin is warm and dry. Comments: Dry patches on scalp   Neurological:      Mental Status: She is alert and oriented to person, place, and time. Motor: Weakness present. Psychiatric:         Mood and Affect: Mood normal.         Behavior: Behavior normal.     Assessment and Plan:      1. Chronic obstructive pulmonary disease, unspecified COPD type ([address]  Stable. Continue COPD directed therapies. Call for increased SOB, cough, sputum production, excessive fatigue, fever, chills, or myalgias. Activity as tolerated. 2. Chronic combined systolic and diastolic CHF  Stable. Monitor weight daily, call if you gain 3 lbs in one day or 5 lbs. in one week or for increased edema, sob, cough, orthopnea, or chest pain. Patient follows with cardiology. 3. Hyperglycemia  4. Sleep disturbance  5. Chronic fatigue  6. Chronic anemia  Pt with history of hyperglycemia related episodes. Patient now following with Dr. Blake Neff (endocrine). Blood sugars have remained <300 since prior visit and reports improved energy levels and overall feeling better  She does have increased fatigue this visit, with c/f UTI.    7. Other microscopic hematuria  8. Chronic UTI  9. UTI symptoms  10.  Hx of CKD  Continue to follow with urology- appt 8/26  - UA C&S reordered  - Currently on daily Macrodantin 50 mg as prophylactic for frequent UTIs, as prescribed by

## 2022-07-21 DIAGNOSIS — R39.9 UTI SYMPTOMS: ICD-10-CM

## 2022-07-21 LAB
BACTERIA: ABNORMAL /HPF
BILIRUBIN URINE: NEGATIVE
BLOOD, URINE: ABNORMAL
CLARITY: ABNORMAL
COLOR: YELLOW
EPITHELIAL CELLS, UA: ABNORMAL /HPF (ref 0–5)
GLUCOSE URINE: 100 MG/DL
HYALINE CASTS: ABNORMAL /HPF (ref 0–5)
HYALINE CASTS: ABNORMAL /LPF (ref 0–5)
KETONES, URINE: NEGATIVE MG/DL
LEUKOCYTE ESTERASE, URINE: ABNORMAL
NITRITE, URINE: NEGATIVE
PH UA: 5.5 (ref 5–9)
PROTEIN UA: 100 MG/DL
RBC UA: ABNORMAL /HPF (ref 0–5)
SPECIFIC GRAVITY UA: 1.02 (ref 1–1.03)
URINE REFLEX TO CULTURE: YES
UROBILINOGEN, URINE: 0.2 E.U./DL
WBC UA: >100 /HPF (ref 0–5)

## 2022-07-24 LAB
ORGANISM: ABNORMAL
URINE CULTURE, ROUTINE: ABNORMAL
URINE CULTURE, ROUTINE: ABNORMAL

## 2022-07-26 ENCOUNTER — OFFICE VISIT (OUTPATIENT)
Dept: CARDIOLOGY CLINIC | Age: 77
End: 2022-07-26
Payer: MEDICARE

## 2022-07-26 VITALS
HEART RATE: 76 BPM | OXYGEN SATURATION: 98 % | HEIGHT: 59 IN | SYSTOLIC BLOOD PRESSURE: 136 MMHG | RESPIRATION RATE: 16 BRPM | BODY MASS INDEX: 29.8 KG/M2 | WEIGHT: 147.8 LBS | DIASTOLIC BLOOD PRESSURE: 88 MMHG

## 2022-07-26 DIAGNOSIS — K92.2 GASTROINTESTINAL HEMORRHAGE, UNSPECIFIED GASTROINTESTINAL HEMORRHAGE TYPE: ICD-10-CM

## 2022-07-26 DIAGNOSIS — R20.0 BILATERAL HAND NUMBNESS: ICD-10-CM

## 2022-07-26 DIAGNOSIS — I50.41 ACUTE COMBINED SYSTOLIC AND DIASTOLIC CHF, NYHA CLASS 1 (HCC): Primary | ICD-10-CM

## 2022-07-26 DIAGNOSIS — R09.89 BILATERAL CAROTID BRUITS: ICD-10-CM

## 2022-07-26 DIAGNOSIS — I25.10 CORONARY ARTERY DISEASE INVOLVING NATIVE CORONARY ARTERY OF NATIVE HEART WITHOUT ANGINA PECTORIS: ICD-10-CM

## 2022-07-26 DIAGNOSIS — I16.0 HYPERTENSIVE URGENCY: ICD-10-CM

## 2022-07-26 DIAGNOSIS — I42.1 HOCM (HYPERTROPHIC OBSTRUCTIVE CARDIOMYOPATHY) (HCC): ICD-10-CM

## 2022-07-26 DIAGNOSIS — E78.2 MIXED HYPERLIPIDEMIA: ICD-10-CM

## 2022-07-26 DIAGNOSIS — M79.641 BILATERAL HAND PAIN: ICD-10-CM

## 2022-07-26 DIAGNOSIS — M79.642 BILATERAL HAND PAIN: ICD-10-CM

## 2022-07-26 DIAGNOSIS — N18.4 STAGE 4 CHRONIC KIDNEY DISEASE (HCC): ICD-10-CM

## 2022-07-26 PROCEDURE — 99214 OFFICE O/P EST MOD 30 MIN: CPT | Performed by: INTERNAL MEDICINE

## 2022-07-26 PROCEDURE — 1123F ACP DISCUSS/DSCN MKR DOCD: CPT | Performed by: INTERNAL MEDICINE

## 2022-07-26 ASSESSMENT — ENCOUNTER SYMPTOMS
WHEEZING: 0
NAUSEA: 0
EYES NEGATIVE: 1
BLOOD IN STOOL: 0
SHORTNESS OF BREATH: 0
RESPIRATORY NEGATIVE: 1
COUGH: 0
CHEST TIGHTNESS: 0
GASTROINTESTINAL NEGATIVE: 1
STRIDOR: 0

## 2022-07-26 NOTE — PROGRESS NOTES
Subsequent Progress Note  Patient: Toño Salinas  YOB: 1945  MRN: [id number]    Chief Complaint: htn HF SOB DM HOCM  Chief Complaint   Patient presents with    3 Month Follow-Up    Congestive Heart Failure       CV Data:  3/2019 Echo EF 79% no KYLE   10/8/2019 Cath CX 20-30 LVEF 95%   10/2020 Echo EF 55 Moderate CLVH  10/5/2020 Cath LAD 50-60   3/21 Echo EF 50 Severe LVH 1-2+ MR RVSP 31   1/61 LICA >71  Peak Velocity  233 m/s WU 50-69    Subjective/HPI: she stopped Verapamil 3 days ago due to severe fatigue and weakness. Feels better now. No cp no sob no bleed. C/o nocturnal leg cramps    3/27/2020 Patient and/or health care decision maker is aware that that he may receive a bill for this telephone service, depending on his insurance coverage, and has provided verbal consent to proceed. This visit was completed via telephone. Time spent on the phone with patient 18 minutes. She was to f/u with Dr. Varsha Lay but was on my VV schedule today. No CP no SOB. Still has occasional night time leg cramps. She is walking and compliant with meds. No LE edema. 1/15/21 recent multiple hospitalization. She was intubated and in shock. Had TVP for Bradycardia but stabilized and did not require PPM.      3/11/21 TELEHEALTH EVALUATION -- Audio/Visual (During DIXIJ-99 public health emergency)    No cp no sob no falls no bleed. Not walking much. Poor appetite.  /104. .. ran out of Verapamil and Clonidine. 3/18/21 Patient and/or health care decision maker is aware that that he/she may receive a bill for this telephone service, depending on his insurance coverage, and has provided verbal consent to proceed. This visit was completed via telephone. Total time 14 minutes. Had been doing well. This AM she took extra full dose Torsemde 50 mg because she thought she was not uriniating enough.  called in due to her BP being 79/56. She is awake and alert sitting. No cp no sob. Just feels weak and tired     4/16/21 TELEHEALTH EVALUATION -- Audio/Visual (During TAAML-36 public health emergency)    Recent resp failure intubated. No cp no sob no falls no bleed Bp stable. Her voice is hoarse from ET tube and swelling. She has no dysphagia    6/1/21 feels better. Recent hosp had intestinal bleed and taken off ASA>  She will have denat extraction o 6/26. She will stay off ASA for now. 8/6/21 legs weak. Walk little with lobo. No falls no bleed. Eeats well. Sleeps too much. Always Early AM BP elevated. 11/5/21 not very active having sob no falls no bleed. occ CP at night. occ dizzy    4/28/22 doing well no cp no sob no falls. No further bleed. Recent pontine cva. plavix was stopped and low dose Eliquis started. Pt cries often now since CVA. 7/26/22 both hands hurt no cp no sob no fall sno bleed. Take smeds. EKG: SR67    Past Medical History:   Diagnosis Date    Adenomatous polyp of sigmoid colon     Adenomatous polyp of transverse colon     Anxiety     Asthma     dx 2019 / has smoked since age 11    Brainstem stroke ([address]     Cardiopulmonary arrest ([address]     CHF (congestive heart failure) ([facility])     Chronic back pain     Bilateral L5 S1 Radic on emg--surprisingly worse on the left than the right--pt's symptoms and her MRI show worse on the right    Chronic obstructive pulmonary disease with acute exacerbation ([address] 10/12/2019    Depression     ESBL E. coli carrier     Carrier.     Fibromyalgia     Gastrointestinal hemorrhage 2/24/2020    Hyperlipidemia     meds > 8 yrs    Hypertension     meds > 45 yrs    Insomnia 12/4/2013    On home O2     2l per n/c at bedtime mostly,     Osteoarthritis     Seizures ([address]     Sepsis ([address] 10/6/2020    Smoker 6/18/2013    Type II diabetes mellitus, uncontrolled ([address]     hx > 8 yrs    Unspecified sleep apnea        Past Surgical History:   Procedure Laterality Date    BACK SURGERY  2017    lumbar disc    CARDIAC CATHETERIZATION  11/03/2014 DR. MIRELES / no stents    COLONOSCOPY  2016    w/polypectomy     COLONOSCOPY N/A 2020    COLONOSCOPY WITH POLYPECTOMY performed by Satlin Morris MD at [address] N/A 10/07/2019    EUA HYSTEROSCOPY DILATATION AND CURETTAGE [facility] Trini Denise DO [facility] [address] [facility], COLON, DIAGNOSTIC      EYE SURGERY      Phaco with IOL OU / [address] [address]    umbilical hernia repair    IR PORT PLACEMENT EQUAL OR GREATER THAN 5 YEARS  2021    IR PORT PLACEMENT EQUAL OR GREATER THAN 5 YEARS 2021 MLOZ SPECIAL PROCEDURE    VT ESOPHAGOGASTRODUODENOSCOPY TRANSORAL DIAGNOSTIC N/A 2017    EGD ESOPHAGOGASTRODUODENOSCOPY performed by Eddie Foreman MD at [place] [address] 2018    negative findings    VT REVISE MEDIAN N/CARPAL TUNNEL SURG Left 2017    LEFT  CAR[facility] N/A 2018    TONSILLECTOMY      as child    TUNNELED VENOUS PORT PLACEMENT Right 2021    [employer] [address] [employer] Port ClearVUE by Dr. Lilliam Omalley  2016    w/bx     UPPER GASTROINTESTINAL ENDOSCOPY N/A 2020    EGD possible biopsy performed by Stalin Morris MD at [address] [address] N/A 2020    EGD PUSH ENTEROSCOPY performed by Annie Zhou MD at [facility]       Family History   Problem Relation Age of Onset    Heart Disease Father         cardiac bypass    Arthritis Father     Arthritis Mother     Other Mother          at age 80    Other Sister         [facility]    No Known Problems Daughter     Stroke Son        Social History     Socioeconomic History    Marital status:      Spouse name: Foreign Brody    Number of children: 2    Years of education: 12    Highest education level: High school graduate   Occupational History    Occupation: Retired-   Tobacco Use    Smoking status: Former     Packs/day: 1.00     Years: 59.00     Pack years: 59.00     Types: Cigarettes     Start date: 2017     Quit date: 10/1/2020     Years since quittin.8    Smokeless tobacco: Never   Vaping Use    Vaping Use: Never used   Substance and Sexual Activity    Alcohol use: Not Currently    Drug use: No    Sexual activity: Yes     Partners: Male   Social History Narrative    Grew up in [place]     Lives With:  Ana Lagunas Spouse    Type of Home: House    Home Layout: Two level, Performs ADL's on one level    Home Access: Stairs to enter with rails    Entrance Stairs - Number of Steps: 4    Bathroom Shower/Tub: Tub/Shower unit, Doors    Bathroom Equipment: Shower chair, Grab bars in [facility]: Rolling walker, Cane, Oxygen(uses her O2 very seldom)    ADL Assistance: Needs assistance    Homemaking Assistance: Needs assistance    Homemaking Responsibilities: No(spouse performs)    Ambulation Assistance: Independent    Transfer Assistance: Independent    Active : No    Occupation: Retired    Type of occupation: worked in [employer]: patient enjoys [employer]     Social Determinants of Health     Financial Resource Strain: Low Risk     Difficulty of Paying Living Expenses: Not hard at all   Food Insecurity: No Food Insecurity    Worried About [address] in the Last Year: Never true    Ran Out of Food in the Last Year: Never true       Allergies   Allergen Reactions    Ibuprofen Nausea Only    Metformin And Related      Diarrhea      Darvon [Propoxyphene Hcl] Nausea And Vomiting       Current Outpatient Medications   Medication Sig Dispense Refill    carvedilol (COREG) 25 MG tablet TAKE 1 & 1/2 (ONE & ONE-HALF) TABLETS BY MOUTH TWICE DAILY WITH MEALS 270 tablet 0    rosuvastatin (CRESTOR) 40 MG tablet TAKE 1 TABLET BY MOUTH ONCE DAILY IN THE EVENING 90 tablet 0    Continuous Blood Gluc Sensor (FREESTYLE MURALI 2 SENSOR) MISC 1 Device by Does not apply route every 14 days 2 each 3    Continuous Blood Gluc  (FREESTYLE MURALI 2 READER) [place] 1 Device by Does not apply route 4 times daily (before meals and nightly) 1 each 0    insulin glargine (LANTUS) 100 UNIT/ML injection vial INJECT 75 UNITS AT BEDTIME, 20 UNITS IN THE MORNING  .  E11.69 40 mL 3    insulin lispro (HUMALOG) 100 UNIT/ML SOLN injection vial INJECT 3 TIMES DAILY- 24 units before lunch, 24 units mid-day snack, Increase 36 units before dinner 10 mL 1    ACCU-CHEK PHYLLIS PLUS strip Test 3x daily Dx E11.65 100 each 3    fluocinonide (LIDEX) 0.05 % external solution Apply topically 2 times daily.  60 mL 0    dextromethorphan-quiNIDine (NUEDEXTA) 20-10 MG CAPS per capsule Take 1 capsule by mouth daily 30 capsule 3    nitrofurantoin (MACRODANTIN) 50 MG capsule One po daily 90 capsule 3    ferrous sulfate (IRON 325) 325 (65 Fe) MG tablet Take 1 tablet by mouth every other day 30 tablet 3    PARoxetine (PAXIL) 40 MG tablet TAKE 1 TABLET BY MOUTH ONCE DAILY IN THE MORNING (Patient taking differently: at bedtime TAKE 1 TABLET BY MOUTH ONCE DAILY IN THE EVENING) 90 tablet 1    amLODIPine (NORVASC) 5 MG tablet Take 1 tablet by mouth daily 90 tablet 3    glucose 4 g chewable tablet Take 4 tablets by mouth as needed for Low blood sugar 60 tablet 3    Glucagon (GVOKE HYPOPEN 2-PACK) 1 MG/0.2ML SOAJ Inject 1 mg into the skin every 15 minutes as needed (glucose less tahtn 70 or if symptomatic) 2 each 3    digoxin (LANOXIN) 125 MCG tablet Take 1 tablet by mouth every other day 45 tablet 3    albuterol-ipratropium (COMBIVENT RESPIMAT)  MCG/ACT AERS inhaler Inhale 1 puff into the lungs every 6 hours as needed for Wheezing or Shortness of Breath 4 g 5    ipratropium-albuterol (DUONEB) 0.5-2.5 (3) MG/3ML SOLN nebulizer solution Inhale 3 mLs into the lungs 3 times daily 360 mL 0    Cranberry 500 MG CAPS Take 500 mg by mouth daily      pantoprazole wheezes. She has no rales. She exhibits no tenderness. Abdominal: Soft. Bowel sounds are normal. There is no abdominal tenderness. Musculoskeletal:         General: No tenderness or edema. Normal range of motion. Cervical back: Normal range of motion and neck supple. Neurological: She is alert and oriented to person, place, and time. Skin: Skin is warm. No cyanosis. Nails show no clubbing.      LABS:  CBC:   Lab Results   Component Value Date/Time    WBC 6.9 05/18/2022 10:56 AM    RBC 3.94 05/18/2022 10:56 AM    HGB 10.8 05/18/2022 10:56 AM    HCT 33.9 05/18/2022 10:56 AM    MCV 85.9 05/18/2022 10:56 AM    MCH 27.4 05/18/2022 10:56 AM    MCHC 31.9 05/18/2022 10:56 AM    RDW 16.9 05/18/2022 10:56 AM     05/18/2022 10:56 AM    MPV 8.8 08/01/2015 09:33 AM     Lipids:  Lab Results   Component Value Date    CHOL 106 04/07/2022    CHOL 143 11/30/2020    CHOL 105 02/24/2020     Lab Results   Component Value Date    TRIG 183 (H) 04/07/2022    TRIG 64 11/30/2020    TRIG 67 02/24/2020     Lab Results   Component Value Date    HDL 33 (L) 04/07/2022    HDL 61 (H) 11/30/2020    HDL 42 02/24/2020     Lab Results   Component Value Date    LDLCALC 36 04/07/2022    LDLCALC 69 11/30/2020    LDLCALC 50 02/24/2020     No results found for: LABVLDL, VLDL  Lab Results   Component Value Date    CHOLHDLRATIO 3.4 09/11/2012     CMP:    Lab Results   Component Value Date/Time     05/18/2022 10:56 AM    K 4.4 05/18/2022 10:56 AM    K 4.1 05/11/2022 06:00 AM     05/18/2022 10:56 AM    CO2 19 05/18/2022 10:56 AM    BUN 28 05/18/2022 10:56 AM    CREATININE 1.89 05/18/2022 10:56 AM    GFRAA 31.1 05/18/2022 10:56 AM    LABGLOM 25.7 05/18/2022 10:56 AM    GLUCOSE 240 07/14/2022 01:43 PM    PROT 6.8 05/18/2022 10:56 AM    LABALBU 4.0 05/18/2022 10:56 AM    CALCIUM 9.6 05/18/2022 10:56 AM    BILITOT 0.3 05/18/2022 10:56 AM    ALKPHOS 115 05/18/2022 10:56 AM    AST 18 05/18/2022 10:56 AM    ALT 17 05/18/2022 10:56 AM BMP:    Lab Results   Component Value Date/Time     05/18/2022 10:56 AM    K 4.4 05/18/2022 10:56 AM    K 4.1 05/11/2022 06:00 AM     05/18/2022 10:56 AM    CO2 19 05/18/2022 10:56 AM    BUN 28 05/18/2022 10:56 AM    LABALBU 4.0 05/18/2022 10:56 AM    CREATININE 1.89 05/18/2022 10:56 AM    CALCIUM 9.6 05/18/2022 10:56 AM    GFRAA 31.1 05/18/2022 10:56 AM    LABGLOM 25.7 05/18/2022 10:56 AM    GLUCOSE 240 07/14/2022 01:43 PM     Magnesium:    Lab Results   Component Value Date/Time    MG 2.3 04/06/2022 02:30 PM     TSH:  Lab Results   Component Value Date    TSH 0.474 11/30/2020       Patient Active Problem List   Diagnosis    Hypertension    Hyperlipidemia    Fibromyalgia    Anxiety    Depression    DJD (degenerative joint disease), lumbar    Lumbosacral radiculopathy at S1    Dysphonia    CTS (carpal tunnel syndrome)    High risk medication use-Norco - 12/20/17 OARRS PM&R, 02/20/18 OARRS PM&R, 03/07/18 OARRS PM&R, Urine Drug screen negative 02/06/17 PM&R--MED CONTRACT 2/6/17    SOB (shortness of breath) on exertion    Memory deficit    Chronic UTI    Artificial lens present    Presbyopia    Astigmatism, regular    Cataract, nuclear sclerotic senile    Regular astigmatism    Nuclear senile cataract    Lumbosacral radiculopathy at L5    Spinal stenosis of lumbar region with neurogenic claudication    Impaired mobility and activities of daily living    Diabetic asymmetric polyneuropathy (HCC)    Myalgia    Acute combined systolic and diastolic CHF, NYHA class 1 ([address]    Uncontrolled type 2 diabetes mellitus with hyperglycemia (HCC)    Chronic obstructive pulmonary disease (HCC)    HOCM (hypertrophic obstructive cardiomyopathy) (HCC)    Anemia    AVM (arteriovenous malformation)    Anemia of chronic renal failure    Dysarthria    Other fatigue    Major depressive disorder in remission ([address]    Gastroesophageal reflux disease without esophagitis    Acute cystitis with hematuria    Gait abnormality Late effects of CVA (cerebrovascular accident)    Acute respiratory failure with hypoxia (HCC)    Palliative care encounter    Bradycardia    Moderate hypoxic-ischemic encephalopathy    Infection due to ESBL-producing Escherichia coli    Port-A-Cath in place    Chronic kidney disease due to hypertension    History of GI bleed    Chronic right-sided thoracic back pain    Seizures (HCC)    Yeast infection    Chronic kidney disease, stage 3b (HCC)    Diabetic renal disease (HCC)    Hypervolemia    Chronic kidney disease, stage 4 (severe) (HCC)    Other microscopic hematuria    Chronic combined systolic and diastolic CHF (congestive heart failure) (HCC)    Auditory hallucinations    Emotional lability    Hyperglycemia    Sleep disturbance    EFRAIN (internuclear ophthalmoplegia), right    Brainstem stroke (HCC)    Symptomatic anemia    Claustrophobia    Ataxia    Pseudobulbar affect       There are no discontinued medications. Modified Medications    No medications on file       No orders of the defined types were placed in this encounter. Assessment/Plan:    1. Acute combined systolic and diastolic CHF, NYHA class 1 (formerly [facility])   compensated   - Basic Metabolic Panel; Future    2. Essential hypertension   not controlled in AM-- will give Amlodipine 5 mg at [facility]. 3. Mixed hyperlipidemia - statin rx. Low fat diet f/u labs     4. HOCM (hypertrophic obstructive cardiomyopathy) (formerly [facility])  Continue BB ACEI. And Dig.     5. Hypotension- stable now    6. Resp failure- stable. Need Therapy. Need to walk daily. Eat well. Take meds. 7. Intestinal Bleed- stay off ASA- if Hb stable will consider resume in future. 8. Recent CVA 4/7/22 - pontine- stable. 9. Carotid -  LICA > 70 by CUS but velocity is only 233. We will repeat CUS in 6 mo. 10. B/L Hand and wrist numbness and pain. - probable carpapl tunnel - refer to ortho     Counseling:  Heart Healthy Lifestyle, Low Salt Diet, Take Precautions to Prevent Falls and Walk Daily    No follow-ups on file.       Electronically signed by Micaela Figueroa MD on 7/26/2022 at 1:50 PM

## 2022-08-04 DIAGNOSIS — E11.65 UNCONTROLLED TYPE 2 DIABETES MELLITUS WITH HYPERGLYCEMIA (HCC): ICD-10-CM

## 2022-08-04 RX ORDER — INSULIN LISPRO 100 [IU]/ML
INJECTION, SOLUTION INTRAVENOUS; SUBCUTANEOUS
Qty: 30 ML | Refills: 3 | Status: SHIPPED | OUTPATIENT
Start: 2022-08-04 | End: 2022-11-02 | Stop reason: SDUPTHER

## 2022-08-05 RX ORDER — DOXYCYCLINE HYCLATE 100 MG
100 TABLET ORAL 2 TIMES DAILY
Qty: 20 TABLET | Refills: 0 | Status: SHIPPED | OUTPATIENT
Start: 2022-08-05 | End: 2022-08-15

## 2022-08-09 DIAGNOSIS — E11.65 UNCONTROLLED TYPE 2 DIABETES MELLITUS WITH HYPERGLYCEMIA (HCC): ICD-10-CM

## 2022-08-09 RX ORDER — BLOOD SUGAR DIAGNOSTIC
STRIP MISCELLANEOUS
Qty: 100 EACH | Refills: 3 | Status: SHIPPED | OUTPATIENT
Start: 2022-08-09

## 2022-08-12 RX ORDER — DIGOXIN 125 MCG
125 TABLET ORAL EVERY OTHER DAY
Qty: 45 TABLET | Refills: 0 | Status: SHIPPED | OUTPATIENT
Start: 2022-08-12

## 2022-08-12 RX ORDER — PANTOPRAZOLE SODIUM 40 MG/1
40 TABLET, DELAYED RELEASE ORAL
Qty: 90 TABLET | Refills: 3 | Status: SHIPPED | OUTPATIENT
Start: 2022-08-12

## 2022-08-12 NOTE — PROGRESS NOTES
Rx requested:  Requested Prescriptions     Pending Prescriptions Disp Refills    pantoprazole (PROTONIX) 40 MG tablet 90 tablet 3     Sig: Take 1 tablet by mouth every morning (before breakfast)       Last Office Visit:   Visit date not found      Last filled:  8/19/21    Next Visit Date:  Future Appointments   Date Time Provider Zaid Lr   8/23/2022  1:15 PM Steven Salinas MD [facility]   9/28/2022  1:00 PM Lazaro Villavicencio MD [facility]   10/12/2022  3:00 PM JOHNATHON Vargas - [facility]   10/12/2022  3:15 PM Sen Hui MD [facility]   10/13/2022  1:45 PM Sravan Torres [address] [place] [address]   10/26/2022  2:15 PM Amarilys Dao MD [facility]

## 2022-08-12 NOTE — TELEPHONE ENCOUNTER
Comments: This request is coming from the pharmacy. Last Office Visit (last PCP visit):   7/26/2022    Next Visit Date:  Future Appointments   Date Time Provider Zaid Lr   8/23/2022  1:15 PM Arthur Storm MD [facility]   9/28/2022  1:00 PM [address] Serrano MD [address]   10/12/2022  3:00 PM Hans Wharton APRN - [facility]   10/12/2022  3:15 PM Alfonso White MD [facility]   10/13/2022  1:45 PM Cosmo Flores, [address]   10/26/2022  2:15 PM Jakob Hernandez MD [facility]       If hasn't been seen in over a year OR hasn't followed up according to last diabetes/ADHD visit, make appointment for patient before sending refill to provider.     Rx requested:  Requested Prescriptions     Pending Prescriptions Disp Refills    digoxin (LANOXIN) 125 MCG tablet [Pharmacy Med Name: Digoxin 125 MCG Oral Tablet] 45 tablet 0     Sig: TAKE 1 TABLET BY MOUTH EVERY OTHER DAY

## 2022-08-15 RX ORDER — PANTOPRAZOLE SODIUM 40 MG/1
TABLET, DELAYED RELEASE ORAL
Qty: 90 TABLET | Refills: 0 | OUTPATIENT
Start: 2022-08-15

## 2022-08-23 ENCOUNTER — OFFICE VISIT (OUTPATIENT)
Dept: UROLOGY | Age: 77
End: 2022-08-23
Payer: MEDICARE

## 2022-08-23 VITALS
WEIGHT: 147 LBS | BODY MASS INDEX: 29.64 KG/M2 | DIASTOLIC BLOOD PRESSURE: 62 MMHG | HEART RATE: 80 BPM | SYSTOLIC BLOOD PRESSURE: 124 MMHG | HEIGHT: 59 IN | OXYGEN SATURATION: 98 %

## 2022-08-23 DIAGNOSIS — N39.0 CHRONIC UTI: ICD-10-CM

## 2022-08-23 DIAGNOSIS — N39.0 CHRONIC UTI: Primary | ICD-10-CM

## 2022-08-23 LAB
BILIRUBIN, POC: ABNORMAL
BLOOD URINE, POC: ABNORMAL
CLARITY, POC: CLEAR
COLOR, POC: YELLOW
GLUCOSE URINE, POC: ABNORMAL
KETONES, POC: ABNORMAL
LEUKOCYTE EST, POC: ABNORMAL
NITRITE, POC: ABNORMAL
PH, POC: 5
PROTEIN, POC: ABNORMAL
SPECIFIC GRAVITY, POC: >=1.03
UROBILINOGEN, POC: 0.2

## 2022-08-23 PROCEDURE — 99213 OFFICE O/P EST LOW 20 MIN: CPT | Performed by: UROLOGY

## 2022-08-23 PROCEDURE — 81003 URINALYSIS AUTO W/O SCOPE: CPT | Performed by: UROLOGY

## 2022-08-23 PROCEDURE — 1123F ACP DISCUSS/DSCN MKR DOCD: CPT | Performed by: UROLOGY

## 2022-08-23 NOTE — PROGRESS NOTES
[facility] EVALUATION NOTE                                                 H&P                                                                                                                                                 Reason for Visit  Recurrent UTIs    History of Present Illness  69-year-old female with history of poorly controlled diabetes with recurrent UTI secondary to stasis from hypotonia/neuropathy of bladder function  Patient now on chronic prophylaxis with nitrofurantoin low-dose 50 mg p.o. daily  Blood sugars under better control without recurrent UTI  Urinalysis today is clear      Urologic Review of Systems/Symptoms  Improved voiding symptoms    Review of Systems  Hospitalization: None recent  All 14 categories of Review of Systems otherwise reviewed no other findings reported. No change in review of systems  Past Medical History:   Diagnosis Date    Adenomatous polyp of sigmoid colon     Adenomatous polyp of transverse colon     Anxiety     Asthma     dx 2019 / has smoked since age 11    Brainstem stroke ([address]     Cardiopulmonary arrest ([address]     CHF (congestive heart failure) ([facility])     Chronic back pain     Bilateral L5 S1 Radic on emg--surprisingly worse on the left than the right--pt's symptoms and her MRI show worse on the right    Chronic obstructive pulmonary disease with acute exacerbation ([address] 10/12/2019    Depression     ESBL E. coli carrier     Carrier. Fibromyalgia     Gastrointestinal hemorrhage 2/24/2020    Hyperlipidemia     meds > 8 yrs    Hypertension     meds > 45 yrs    Insomnia 12/4/2013    On home O2     2l per n/c at bedtime mostly,     Osteoarthritis     Seizures ([address]     Sepsis ([address] 10/6/2020    Smoker 6/18/2013    Type II diabetes mellitus, uncontrolled ([address]     hx > 8 yrs    Unspecified sleep apnea      Past Surgical History:   Procedure Laterality Date    BACK SURGERY  2017    lumbar disc    CARDIAC CATHETERIZATION  11/03/2014    DR. MIRELES / no stents COLONOSCOPY  2016    w/polypectomy     COLONOSCOPY N/A 2020    COLONOSCOPY WITH POLYPECTOMY performed by Fish Hernandez MD at [address] N/A 10/07/2019    EUA HYSTEROSCOPY DILATATION AND CURETTAGE performed by Emily Chua DO at [address] [facility], COLON, DIAGNOSTIC      EYE SURGERY      Phaco with IOL OU / [address]  [address]    umbilical hernia repair    IR PORT PLACEMENT EQUAL OR GREATER THAN 5 YEARS  2021    IR PORT PLACEMENT EQUAL OR GREATER THAN 5 YEARS 2021 [facility]Z SPECIAL PROCEDURE    MT ESOPHAGOGASTRODUODENOSCOPY TRANSORAL DIAGNOSTIC N/A 2017    EGD ESOPHAGOGASTRODUODENOSCOPY performed by Jesus Little MD at [place] [address] 2018    negative findings    MT REVISE MEDIAN N/CARPAL TUNNEL SURG Left 2017    LEFT  CARPAL TUNNEL RELEASE performed by Sonal Riley MD at [address] [facility] PROI,4+L/I,TUIQM N/A 2018    TONSILLECTOMY      as child    TUNNELED VENOUS PORT PLACEMENT Right 2021    [address] Dr[address] Santiago Neighbor  2016    w/bx     UPPER GASTROINTESTINAL ENDOSCOPY N/A 2020    EGD possible biopsy performed by Fish Hernandez MD at [facility] N/A 2020    EGD PUSH ENTEROSCOPY performed by Valerie Rea MD at [address] [facility] History    Marital status:      Spouse name: Wilbert Capellan    Number of children: 2    Years of education: 12    Highest education level: High school graduate   Occupational History    Occupation: Retired-   Tobacco Use    Smoking status: Former     Packs/day: 1.00     Years: 59.00     Pack years: 59.00     Types: Cigarettes     Start date: 2017     Quit date: 10/1/2020     Years since quittin.8    Smokeless tobacco: Never   Vaping Use Vaping Use: Never used   Substance and Sexual Activity    Alcohol use: Not Currently    Drug use: No    Sexual activity: Yes     Partners: Male   Social History Narrative    Grew up in [place]     Lives With:  [id number] Spouse    Type of Home: House    Home Layout: Two level, Performs ADL's on one level    Home Access: Stairs to enter with rails    Entrance Stairs - Number of Steps: 4    Bathroom Shower/Tub: Tub/Shower unit, Doors    Bathroom Equipment: Shower chair, Grab bars in [address] [place]: Rolling walker, Cane, Oxygen(uses her O2 very seldom)    ADL Assistance: Needs assistance    Homemaking Assistance: Needs assistance    Homemaking Responsibilities: No(spouse performs)    Ambulation Assistance: Independent    Transfer Assistance: Independent    Active : No    Occupation: Retired    Type of occupation: worked in [employer]: patient enjoys [employer]     Social Determinants of Health     Financial Resource Strain: Low Risk     Difficulty of Paying Living Expenses: Not hard at all   Food Insecurity: No Food Insecurity    Worried About [address] in the Last Year: Never true    [address] [place] [address] in the Last Year: Never true     Family History   Problem Relation Age of Onset    Heart Disease Father         cardiac bypass    Arthritis Father     Arthritis Mother     Other Mother          at age 80    Other Sister         [facility]    No Known Problems Daughter     Stroke Son      Current Outpatient Medications   Medication Sig Dispense Refill    digoxin (LANOXIN) 125 MCG tablet TAKE 1 TABLET BY MOUTH EVERY OTHER DAY 45 tablet 0    pantoprazole (PROTONIX) 40 MG tablet Take 1 tablet by mouth every morning (before breakfast) 90 tablet 3    ACCU-CHEK PHYLLIS PLUS strip Test 3x daily Dx E11.65 100 each 3    insulin lispro (HUMALOG) 100 UNIT/ML SOLN injection vial INJECT SUBCUTANEOUSLY THREE TIMES DAILY- 24 UNITS BEFORE LUNCH, 24 UNITS MIDDAY SNACK, AND INCREASE TO 36 UNITS BEFORE DINNER 30 mL 3    carvedilol (COREG) 25 MG tablet TAKE 1 & 1/2 (ONE & ONE-HALF) TABLETS BY MOUTH TWICE DAILY WITH MEALS 270 tablet 0    rosuvastatin (CRESTOR) 40 MG tablet TAKE 1 TABLET BY MOUTH ONCE DAILY IN THE EVENING 90 tablet 0    Continuous Blood Gluc Sensor (FREESTYLE MURALI 2 SENSOR) MISC 1 Device by Does not apply route every 14 days 2 each 3    Continuous Blood Gluc  (FREESTYLE MURALI 2 READER) [place] 1 Device by Does not apply route 4 times daily (before meals and nightly) 1 each 0    insulin glargine (LANTUS) 100 UNIT/ML injection vial INJECT 75 UNITS AT BEDTIME, 20 UNITS IN THE MORNING  .  E11.69 40 mL 3    fluocinonide (LIDEX) 0.05 % external solution Apply topically 2 times daily.  60 mL 0    dextromethorphan-quiNIDine (NUEDEXTA) 20-10 MG CAPS per capsule Take 1 capsule by mouth daily 30 capsule 3    nitrofurantoin (MACRODANTIN) 50 MG capsule One po daily 90 capsule 3    ferrous sulfate (IRON 325) 325 (65 Fe) MG tablet Take 1 tablet by mouth every other day 30 tablet 3    PARoxetine (PAXIL) 40 MG tablet TAKE 1 TABLET BY MOUTH ONCE DAILY IN THE MORNING (Patient taking differently: at bedtime TAKE 1 TABLET BY MOUTH ONCE DAILY IN THE EVENING) 90 tablet 1    amLODIPine (NORVASC) 5 MG tablet Take 1 tablet by mouth daily 90 tablet 3    glucose 4 g chewable tablet Take 4 tablets by mouth as needed for Low blood sugar 60 tablet 3    Glucagon (GVOKE HYPOPEN 2-PACK) 1 MG/0.2ML SOAJ Inject 1 mg into the skin every 15 minutes as needed (glucose less tahtn 70 or if symptomatic) 2 each 3    albuterol-ipratropium (COMBIVENT RESPIMAT)  MCG/ACT AERS inhaler Inhale 1 puff into the lungs every 6 hours as needed for Wheezing or Shortness of Breath 4 g 5    ipratropium-albuterol (DUONEB) 0.5-2.5 (3) MG/3ML SOLN nebulizer solution Inhale 3 mLs into the lungs 3 times daily 360 mL 0    Cranberry 500 MG CAPS Take 500 mg by mouth daily      Cholecalciferol (VITAMIN D3) 50 MCG (2000 UT) CAPS Take 1,000 Units by mouth 2 times daily       lidocaine-prilocaine (EMLA) 2.5-2.5 % cream APPLY CREAM TOPICALLY TO PORT SITE ONE HOUR PRIOR TO APPOINTMENT AS NEEDED      Handicap Placard MISC by Does not apply route Good from 7/15/21 - 7/15/26 1 each 0    Accu-Chek Softclix Lancets MISC bid 100 each 3    Blood Glucose Monitoring Suppl (ACCU-CHEK PHYLLIS CONNECT) w/Device KIT 1 kit by Does not apply route daily 1 kit 0     No current facility-administered medications for this visit. Ibuprofen, Metformin and related, and Darvon [propoxyphene hcl]  All reviewed and verified by Dr Francine Castro on today's visit    No results found for: PSA, PSADIA  Results for POC orders placed in visit on 08/23/22   POCT Urinalysis No Micro (Auto)   Result Value Ref Range    Color, UA yellow     Clarity, UA clear     Glucose, UA POC neg     Bilirubin, UA neg     Ketones, UA neg     Spec Grav, UA >=1.030     Blood, UA POC moderate     pH, UA 5.0     Protein, UA  mg/dL     Urobilinogen, UA 0.2     Leukocytes, UA neg     Nitrite, UA neg        Physical Exam  Vitals:    08/23/22 1326   BP: 124/62   Pulse: 80   SpO2: 98%   Weight: 147 lb (66.7 kg)   Height: 4' 11\" (1.499 m)     Constitutional: Not in distress  Physical exam otherwise unchanged  Urinalysis no evidence of UTI. Assessment/Medical Necessity-Decision Making  Patient is responded to chronic prophylaxis nitrofurantoin 50 mg p.o. daily denies shortness of breath  Better bladder control  Better blood sugars  Urinalysis clear culture sent  Plan  Continue nitrofurantoin 50 mg p.o. daily  Follow-up 6 months  Drop off urine 1 suspicious for UTI  Greater than 50% of 20 minutes spent consulting patient face-to-face  Orders Placed This Encounter   Procedures    Culture, Urine     Standing Status:   Future     Standing Expiration Date:   8/23/2023     Order Specific Question:   Specify (ex-cath, midstream, cysto, etc)?      Answer:   m    POCT Urinalysis No Micro (Auto)     No orders of the defined types were placed in this encounter. Lauren Troy MD       Please note this report has been partially produced using speech recognition software  And may cause contain errors related to that system including grammar, punctuation and spelling as well as words and phrases that may seem inappropriate. If there are questions or concerns please feel free to contact me to clarify.

## 2022-08-24 LAB — URINE CULTURE, ROUTINE: NORMAL

## 2022-09-06 ENCOUNTER — TELEPHONE (OUTPATIENT)
Dept: NEUROLOGY | Age: 77
End: 2022-09-06

## 2022-09-06 NOTE — TELEPHONE ENCOUNTER
Alyse message: This mediation was prescribed to deal with my wife's [facility]) constant crying outburst at any time. It seems to be not working. She has frequent mood swings as well. Please advise.

## 2022-09-16 ENCOUNTER — TELEPHONE (OUTPATIENT)
Dept: NEUROLOGY | Age: 77
End: 2022-09-16

## 2022-09-16 NOTE — TELEPHONE ENCOUNTER
Patients  called, stated she is doing better now that she is taking 2 Nuedexta but he wants to know if it is best to take BID or if they should take 2 together.   Please advise

## 2022-09-21 NOTE — PROGRESS NOTES
Pregnancy test is negative. Subjective:      Patient Id: Isis Espinal was seen at  home in [facility] for palliative medicine follow-up. She was accompanied to the appointment by: daughter, Samara Chan. Chief Complaint   Patient presents with    Follow-up    Medication Refill      HPI       Nithin Wilkes is a 67 y.o. female with a complex medical history that includes GI bleeding throughout the distal small intestine, anxiety, asthma, CHF, COPD, CKD depression, fibromyalgia, OA, cardiac arrest, and sleep apnea. Home visit is necessary in lieu of office due to significant frailty and high symptom burden from comorbid illnesses. Patient is alert and oriented in NAD. She is still having some fatigue, but her spouse is impressed with how much she has been able to do around the house such as chores. Reports having a boil and going to the urgent care. She was put on keflex 500 mg 2 tabs po BID. Only mild discoloration left to the left lower abdomen area. The area surrounding the boil was originally very red. Patient reports weight gain over the last several months. She reports that she has put herself on a diet. No leg swelling or increased SOB. She does have SOB with exertion, but this is her baseline. She has been trying to exercise more by going on walks. Reports chronic cough but no sputum. Last lab draw to check blood count showed that hemoglobin was 11.5. No new issues with bleeding, blood in stool, or coffee-ground emesis. Concerned that it has gone down since last visit. Next blood draw is the first week of September. Patient's back pain has been stable. Currently rating the pain at an 6/10 to the right of her thoracic spine. Also having some low back pain. The pain doesn't seem to bother her as much now. She did not end up trying the lidocaine patches.          Past Medical History:   Diagnosis Date    Anxiety     Asthma     dx 2019 / has smoked since age 11    CHF (congestive heart failure) (HCC)     Chronic back pain Bilateral L5 S1 Radic on emg--surprisingly worse on the left than the right--pt's symptoms and her MRI show worse on the right    Chronic obstructive pulmonary disease with acute exacerbation (HCC) 10/12/2019    Depression     ESBL E. coli carrier     Carrier.  Fibromyalgia     Gastrointestinal hemorrhage 2/24/2020    Hyperlipidemia     meds > 8 yrs    Hypertension     meds > 45 yrs    On home O2     2l per n/c at bedtime mostly,     Osteoarthritis     Smoker 6/18/2013    Type II diabetes mellitus, uncontrolled ([address]     hx > 8 yrs    Unspecified sleep apnea      Past Surgical History:   Procedure Laterality Date    BACK SURGERY  2017    lumbar disc    CARDIAC CATHETERIZATION  11/03/2014    DR. MIRELES / no stents    COLONOSCOPY  08/29/2016    w/polypectomy     COLONOSCOPY N/A 09/29/2020    COLONOSCOPY WITH POLYPECTOMY performed by Armand Joe MD at [facility] N/A 10/07/2019    EUA HYSTEROSCOPY DILATATION AND CURETTAGE performed by Christi Suarez DO at [facility] [address], COLON, DIAGNOSTIC      EYE SURGERY      Phaco with IOL OU / [address]    umbilical hernia repair    IR PORT PLACEMENT EQUAL OR GREATER THAN 5 YEARS  5/14/2021    IR PORT PLACEMENT EQUAL OR GREATER THAN 5 YEARS 5/14/2021 [facility] SPECIAL PROCEDURE    AL ESOPHAGOGASTRODUODENOSCOPY TRANSORAL DIAGNOSTIC N/A 03/24/2017    EGD ESOPHAGOGASTRODUODENOSCOPY performed by Mechelle Murphy MD at [place] [address] 02/08/2018    negative findings    AL REVISE MEDIAN N/CARPAL TUNNEL SURG Left 06/05/2017    LEFT  CARPAL TUNNEL RELEASE performed by Jodee Marcelo MD at [facility],5+O/K,KQWMR N/A 02/08/2018    TONSILLECTOMY      as child    TUNNELED VENOUS PORT PLACEMENT Right 05/14/2021    [address] Port ClearVUE by Dr. Cayla Yeh  08/26/2016    w/bx     UPPER GASTROINTESTINAL ENDOSCOPY N/A 2020    EGD possible biopsy performed by Abdi Wallace MD at [place] [address] N/A 2020    EGD PUSH ENTEROSCOPY performed by Black Thomson MD [facility] Marital status:      Spouse name: Claudia Reason Number of children: 2    Years of education: 12    Highest education level: High school graduate   Occupational History    Occupation: Retired-   Tobacco Use    Smoking status: Former Smoker     Packs/day: 1.00     Years: 59.00     Pack years: 59.00     Types: Cigarettes     Start date: 2017     Quit date: 10/1/2020     Years since quittin.8    Smokeless tobacco: Never Used   Vaping Use    Vaping Use: Never used   Substance and Sexual Activity    Alcohol use: Not Currently    Drug use: No    Sexual activity: Yes     Partners: Male   Other Topics Concern    Not on file   Social History Narrative    Grew up in [address] [place] [address] With:  [id number] Spouse    Type of Home: House    Home Layout: Two level, Performs ADL's on one level    Home Access: Stairs to enter with rails    Entrance Stairs - Number of Steps: 4    Bathroom Shower/Tub: Tub/Shower unit, Doors    Bathroom Equipment: Shower chair, Grab bars in [address] [place]: Rolling walker, Cane, Oxygen(uses her O2 very seldom)    ADL Assistance: Needs assistance    Homemaking Assistance: Needs assistance    Homemaking Responsibilities: No(spouse performs)    Ambulation Assistance: Independent    Transfer Assistance: Independent    Active : No    Occupation: Retired    Type of occupation: worked in [employer]: patient enjoys [address] [facility] [employer] Strain: Low Risk     Difficulty of Paying Living Expenses: Not hard at all   Food Insecurity: No Food Insecurity    Worried About [address] [employer] in the Last Year: Never true    Ran Out of Food in the Last Year: Never true   Transportation Needs: No Transportation Needs    Lack of Transportation (Medical): No    Lack of Transportation (Non-Medical): No   Physical Activity: Inactive    Days of Exercise per Week: 0 days    Minutes of Exercise per Session: 0 min   Stress: No Stress Concern Present    Feeling of Stress : Only a little   Social Connections:  Moderately Integrated    Frequency of Communication with Friends and Family: More than three times a week    Frequency of Social Gatherings with Friends and Family: More than three times a week    Attends Latter day Services: 1 to 4 times per year    Active Member of [employer] Group or Organizations: No    Attends Club or Organization Meetings: Never    Marital Status: Living with partner   Intimate Partner Violence: Not At Risk    Fear of Current or Ex-Partner: No    Emotionally Abused: No    Physically Abused: No    Sexually Abused: No     Family History   Problem Relation Age of Onset    Heart Disease Father         cardiac bypass    Arthritis Father     Arthritis Mother     Other Mother          at age 80    Other Sister         [facility]    No Known Problems Daughter     Stroke Son      Allergies   Allergen Reactions    Ibuprofen Nausea Only    Metformin And Related      Diarrhea      Darvon [Propoxyphene Hcl] Nausea And Vomiting     Current Outpatient Medications on File Prior to Visit   Medication Sig Dispense Refill    cephALEXin (KEFLEX) 500 MG capsule Take 500 mg by mouth 2 times daily      Cranberry 500 MG TABS Take 500 mg by mouth Daily      Cholecalciferol (VITAMIN D3) 50 MCG ( UT) CAPS Take 2,000 Units by mouth Daily      ACCU-CHEK PHYLLIS PLUS strip Test 3x daily Dx E11.65 100 each 3    amLODIPine (NORVASC) 5 MG tablet Take 1 tablet by mouth daily 90 tablet 1    aspirin 81 MG EC tablet Take 1 tablet by mouth daily 30 tablet 3    PARoxetine (PAXIL) 40 MG tablet TAKE 1 constipation, diarrhea and nausea. Genitourinary: Negative. Musculoskeletal: Positive for back pain and gait problem. Skin: Negative. Neurological: Negative for dizziness and speech difficulty. Psychiatric/Behavioral: Negative for confusion, dysphoric mood and sleep disturbance. The patient is not nervous/anxious. Objective:   /67   Pulse 94   Temp 95.1 °F (35.1 °C)   LMP  (LMP Unknown)   SpO2 97%    Wt Readings from Last 3 Encounters:   08/06/21 152 lb (68.9 kg)   05/25/21 141 lb (64 kg)   05/14/21 141 lb (64 kg)     Physical Exam  Constitutional:       General: She is not in acute distress. Appearance: Normal appearance. HENT:      Head: Normocephalic and atraumatic. Eyes:      General: No scleral icterus. Right eye: No discharge. Left eye: No discharge. Extraocular Movements: Extraocular movements intact. Conjunctiva/sclera: Conjunctivae normal.   Cardiovascular:      Rate and Rhythm: Normal rate and regular rhythm. Heart sounds: Normal heart sounds. Pulmonary:      Effort: Pulmonary effort is normal.      Breath sounds: Normal breath sounds. No wheezing. Abdominal:      General: Bowel sounds are normal. There is no distension. Palpations: Abdomen is soft. Tenderness: There is no abdominal tenderness. Musculoskeletal:      Cervical back: Normal range of motion and neck supple. Right lower leg: No edema. Left lower leg: No edema. Skin:     General: Skin is warm and dry. Neurological:      Mental Status: She is alert and oriented to person, place, and time. Mental status is at baseline. Psychiatric:         Mood and Affect: Mood normal.         Behavior: Behavior normal.         Assessment and Plan:      1. History of GI bleed  Refilled protonix 40 mg po daily. No red flags of new GI bleed. Hgb. To be monitored by hematology.     2. Chronic obstructive pulmonary disease, unspecified COPD type ([facility]  Continue COPD

## 2022-09-23 ENCOUNTER — HOSPITAL ENCOUNTER (OUTPATIENT)
Dept: INFUSION THERAPY | Age: 77
Setting detail: INFUSION SERIES
Discharge: HOME | End: 2022-09-23
Payer: MEDICARE

## 2022-09-23 VITALS
DIASTOLIC BLOOD PRESSURE: 58 MMHG | HEART RATE: 69 BPM | TEMPERATURE: 98.1 F | RESPIRATION RATE: 18 BRPM | SYSTOLIC BLOOD PRESSURE: 123 MMHG

## 2022-09-23 DIAGNOSIS — Z95.828 PORT-A-CATH IN PLACE: Primary | ICD-10-CM

## 2022-09-23 PROCEDURE — 2580000003 HC RX 258: Performed by: NURSE PRACTITIONER

## 2022-09-23 PROCEDURE — 96523 IRRIG DRUG DELIVERY DEVICE: CPT

## 2022-09-23 PROCEDURE — 6360000002 HC RX W HCPCS: Performed by: NURSE PRACTITIONER

## 2022-09-23 RX ORDER — SODIUM CHLORIDE 0.9 % (FLUSH) 0.9 %
5-40 SYRINGE (ML) INJECTION PRN
Status: DISCONTINUED | OUTPATIENT
Start: 2022-09-23 | End: 2022-09-24 | Stop reason: HOSPADM

## 2022-09-23 RX ORDER — SODIUM CHLORIDE 0.9 % (FLUSH) 0.9 %
5-40 SYRINGE (ML) INJECTION PRN
Status: CANCELLED | OUTPATIENT
Start: 2022-10-07

## 2022-09-23 RX ORDER — HEPARIN SODIUM (PORCINE) LOCK FLUSH IV SOLN 100 UNIT/ML 100 UNIT/ML
500 SOLUTION INTRAVENOUS PRN
Status: CANCELLED | OUTPATIENT
Start: 2022-10-07

## 2022-09-23 RX ORDER — HEPARIN SODIUM (PORCINE) LOCK FLUSH IV SOLN 100 UNIT/ML 100 UNIT/ML
500 SOLUTION INTRAVENOUS PRN
Status: DISCONTINUED | OUTPATIENT
Start: 2022-09-23 | End: 2022-09-24 | Stop reason: HOSPADM

## 2022-09-23 RX ADMIN — SODIUM CHLORIDE, PRESERVATIVE FREE 10 ML: 5 INJECTION INTRAVENOUS at 14:20

## 2022-09-23 RX ADMIN — HEPARIN 500 UNITS: 100 SYRINGE at 14:20

## 2022-09-27 DIAGNOSIS — E78.00 PURE HYPERCHOLESTEROLEMIA: ICD-10-CM

## 2022-09-27 RX ORDER — ROSUVASTATIN CALCIUM 40 MG/1
TABLET, COATED ORAL
Qty: 90 TABLET | Refills: 0 | Status: SHIPPED | OUTPATIENT
Start: 2022-09-27

## 2022-09-28 NOTE — TELEPHONE ENCOUNTER
Please approve or deny this refill request. The order is pended. Thank you.     LOV 7/26/2022    Next Visit Date:  Future Appointments   Date Time Provider Zaid Adeline   10/12/2022  3:00 PM JOHNATHON Luo - CNP [facility]   10/12/2022  3:15 PM Mike Champion MD [facility]   10/13/2022  1:45 PM Paula Callaway, [address]   10/26/2022  2:15 PM Noemí Evans MD [facility]   2/21/2023  1:15 PM Katia Perez MD [facility]

## 2022-09-29 RX ORDER — CARVEDILOL 25 MG/1
TABLET ORAL
Qty: 270 TABLET | Refills: 0 | Status: SHIPPED | OUTPATIENT
Start: 2022-09-29

## 2022-09-30 RX ORDER — DEXTROMETHORPHAN HYDROBROMIDE AND QUINIDINE SULFATE 20; 10 MG/1; MG/1
1 CAPSULE, GELATIN COATED ORAL DAILY
Qty: 234 CAPSULE | Refills: 0 | COMMUNITY
Start: 2022-09-30 | End: 2022-10-12

## 2022-10-04 DIAGNOSIS — I46.9 CARDIOPULMONARY ARREST (HCC): ICD-10-CM

## 2022-10-04 DIAGNOSIS — M79.7 FIBROMYALGIA: ICD-10-CM

## 2022-10-04 DIAGNOSIS — M54.17 LUMBOSACRAL RADICULOPATHY AT S1: ICD-10-CM

## 2022-10-04 DIAGNOSIS — I50.41 ACUTE COMBINED SYSTOLIC AND DIASTOLIC CHF, NYHA CLASS 1 (HCC): ICD-10-CM

## 2022-10-04 DIAGNOSIS — J44.1 CHRONIC OBSTRUCTIVE PULMONARY DISEASE WITH ACUTE EXACERBATION (HCC): Primary | ICD-10-CM

## 2022-10-04 DIAGNOSIS — I42.1 HOCM (HYPERTROPHIC OBSTRUCTIVE CARDIOMYOPATHY) (HCC): ICD-10-CM

## 2022-10-06 DIAGNOSIS — E11.65 UNCONTROLLED TYPE 2 DIABETES MELLITUS WITH HYPERGLYCEMIA (HCC): ICD-10-CM

## 2022-10-06 DIAGNOSIS — R00.1 BRADYCARDIA: ICD-10-CM

## 2022-10-06 DIAGNOSIS — J96.01 ACUTE RESPIRATORY FAILURE WITH HYPOXIA (HCC): ICD-10-CM

## 2022-10-06 DIAGNOSIS — I42.1 HOCM (HYPERTROPHIC OBSTRUCTIVE CARDIOMYOPATHY) (HCC): Primary | ICD-10-CM

## 2022-10-06 DIAGNOSIS — M54.17 LUMBOSACRAL RADICULOPATHY AT L5: ICD-10-CM

## 2022-10-12 ENCOUNTER — OFFICE VISIT (OUTPATIENT)
Dept: PALLATIVE CARE | Age: 77
End: 2022-10-12
Payer: MEDICARE

## 2022-10-12 ENCOUNTER — OFFICE VISIT (OUTPATIENT)
Dept: NEUROLOGY | Age: 77
End: 2022-10-12
Payer: MEDICARE

## 2022-10-12 VITALS
HEART RATE: 82 BPM | SYSTOLIC BLOOD PRESSURE: 132 MMHG | BODY MASS INDEX: 30.5 KG/M2 | WEIGHT: 151 LBS | DIASTOLIC BLOOD PRESSURE: 68 MMHG

## 2022-10-12 VITALS
WEIGHT: 155 LBS | BODY MASS INDEX: 31.31 KG/M2 | RESPIRATION RATE: 17 BRPM | OXYGEN SATURATION: 96 % | DIASTOLIC BLOOD PRESSURE: 61 MMHG | HEART RATE: 74 BPM | TEMPERATURE: 97.9 F | SYSTOLIC BLOOD PRESSURE: 145 MMHG

## 2022-10-12 DIAGNOSIS — R13.10 DYSPHAGIA, UNSPECIFIED TYPE: ICD-10-CM

## 2022-10-12 DIAGNOSIS — R27.0 ATAXIA: ICD-10-CM

## 2022-10-12 DIAGNOSIS — I50.42 CHRONIC COMBINED SYSTOLIC AND DIASTOLIC CHF (CONGESTIVE HEART FAILURE) (HCC): ICD-10-CM

## 2022-10-12 DIAGNOSIS — R45.86 EMOTIONAL LABILITY: ICD-10-CM

## 2022-10-12 DIAGNOSIS — R53.82 CHRONIC FATIGUE: ICD-10-CM

## 2022-10-12 DIAGNOSIS — N18.9 CHRONIC KIDNEY DISEASE, UNSPECIFIED CKD STAGE: ICD-10-CM

## 2022-10-12 DIAGNOSIS — E11.43 DIABETIC AUTONOMIC NEUROPATHY ASSOCIATED WITH TYPE 2 DIABETES MELLITUS (HCC): ICD-10-CM

## 2022-10-12 DIAGNOSIS — G47.9 SLEEP DISTURBANCE: ICD-10-CM

## 2022-10-12 DIAGNOSIS — J44.9 CHRONIC OBSTRUCTIVE PULMONARY DISEASE, UNSPECIFIED COPD TYPE (HCC): ICD-10-CM

## 2022-10-12 DIAGNOSIS — B36.9 FUNGAL DERMATITIS: ICD-10-CM

## 2022-10-12 DIAGNOSIS — F48.2 PSEUDOBULBAR AFFECT: ICD-10-CM

## 2022-10-12 DIAGNOSIS — R41.3 MEMORY DEFICIT: Chronic | ICD-10-CM

## 2022-10-12 DIAGNOSIS — Z51.5 PALLIATIVE CARE ENCOUNTER: ICD-10-CM

## 2022-10-12 DIAGNOSIS — H51.21 INO (INTERNUCLEAR OPHTHALMOPLEGIA), RIGHT: ICD-10-CM

## 2022-10-12 DIAGNOSIS — M79.642 PAIN IN BOTH HANDS: Primary | ICD-10-CM

## 2022-10-12 DIAGNOSIS — M79.641 PAIN IN BOTH HANDS: Primary | ICD-10-CM

## 2022-10-12 DIAGNOSIS — I63.9 BRAINSTEM STROKE (HCC): Primary | ICD-10-CM

## 2022-10-12 PROCEDURE — 3052F HG A1C>EQUAL 8.0%<EQUAL 9.0%: CPT | Performed by: NURSE PRACTITIONER

## 2022-10-12 PROCEDURE — 1123F ACP DISCUSS/DSCN MKR DOCD: CPT | Performed by: NURSE PRACTITIONER

## 2022-10-12 PROCEDURE — 99215 OFFICE O/P EST HI 40 MIN: CPT | Performed by: NURSE PRACTITIONER

## 2022-10-12 PROCEDURE — 1123F ACP DISCUSS/DSCN MKR DOCD: CPT | Performed by: PSYCHIATRY & NEUROLOGY

## 2022-10-12 PROCEDURE — 99214 OFFICE O/P EST MOD 30 MIN: CPT | Performed by: PSYCHIATRY & NEUROLOGY

## 2022-10-12 RX ORDER — NYSTATIN 100000 [USP'U]/G
POWDER TOPICAL
Qty: 1 EACH | Refills: 3 | Status: SHIPPED | OUTPATIENT
Start: 2022-10-12

## 2022-10-12 ASSESSMENT — ENCOUNTER SYMPTOMS
COLOR CHANGE: 0
ABDOMINAL PAIN: 0
VOMITING: 0
PHOTOPHOBIA: 0
NAUSEA: 0
BACK PAIN: 0
BLOOD IN STOOL: 0
COUGH: 1
WHEEZING: 0
NAUSEA: 0
DIARRHEA: 0
CHOKING: 0
CONSTIPATION: 0
COLOR CHANGE: 1
TROUBLE SWALLOWING: 0
VOMITING: 0
CHOKING: 0
BACK PAIN: 0
SHORTNESS OF BREATH: 1
EYE DISCHARGE: 0
SHORTNESS OF BREATH: 0

## 2022-10-12 ASSESSMENT — PATIENT HEALTH QUESTIONNAIRE - PHQ9
SUM OF ALL RESPONSES TO PHQ9 QUESTIONS 1 & 2: 2
SUM OF ALL RESPONSES TO PHQ QUESTIONS 1-9: 2
1. LITTLE INTEREST OR PLEASURE IN DOING THINGS: 1
SUM OF ALL RESPONSES TO PHQ QUESTIONS 1-9: 2
SUM OF ALL RESPONSES TO PHQ QUESTIONS 1-9: 2
2. FEELING DOWN, DEPRESSED OR HOPELESS: 1
SUM OF ALL RESPONSES TO PHQ QUESTIONS 1-9: 2

## 2022-10-12 NOTE — PROGRESS NOTES
Subjective:      Patient ID: Alex Caldwell is a 65 y.o. female who presents today for:  Chief Complaint   Patient presents with    Follow-up     Pts  states that the neudexta is helping. She is taking it 2 times a daily. Pts  states that she is still having crying episodes but it is not as much. 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 Since last seen patient had significant crying spells which are uncontrolled. We were contacted regarding same and we started her on Adoxa. This appears to be expensive we were able to give her samples and she is doing much better. She still breakthrough some symptoms here and there though functioning well. She is not had any falls. She eats well and she sleeps well. Past Medical History:   Diagnosis Date    Adenomatous polyp of sigmoid colon     Adenomatous polyp of transverse colon     Anxiety     Asthma     dx 2019 / has smoked since age 11    Brainstem stroke ([address]     Cardiopulmonary arrest ([address]     CHF (congestive heart failure) (HCC)     Chronic back pain     Bilateral L5 S1 Radic on emg--surprisingly worse on the left than the right--pt's symptoms and her MRI show worse on the right    Chronic obstructive pulmonary disease with acute exacerbation ([address] 10/12/2019    Depression     ESBL E. coli carrier     Carrier. Fibromyalgia     Gastrointestinal hemorrhage 2/24/2020    Hyperlipidemia     meds > 8 yrs    Hypertension     meds > 45 yrs    Insomnia 12/4/2013    On home O2     2l per n/c at bedtime mostly,     Osteoarthritis     Seizures ([address]     Sepsis ([address] 10/6/2020    Smoker 6/18/2013    Type II diabetes mellitus, uncontrolled     hx > 8 yrs    Unspecified sleep apnea      Past Surgical History:   Procedure Laterality Date    BACK SURGERY  2017    lumbar disc    CARDIAC CATHETERIZATION  11/03/2014    DR. MIRELES / no stents    COLONOSCOPY  08/29/2016 w/polypectomy     COLONOSCOPY N/A 2020    COLONOSCOPY WITH POLYPECTOMY performed by Cherelle Kent MD at [address] N/A 10/07/2019    EUA HYSTEROSCOPY DILATATION AND CURETTAGE performed by Malik Yeager DO at [facility]. [address], COLON, DIAGNOSTIC      EYE SURGERY      [place] with IOL OU / [address] [address]    umbilical hernia repair    IR PORT PLACEMENT EQUAL OR GREATER THAN 5 YEARS  2021    IR PORT PLACEMENT EQUAL OR GREATER THAN 5 YEARS 2021 [facility] SPECIAL PROCEDURE    SD ESOPHAGOGASTRODUODENOSCOPY TRANSORAL DIAGNOSTIC N/A 2017    EGD ESOPHAGOGASTRODUODENOSCOPY performed by Joe Patel MD at [address] [address] 2018    negative findings    SD REVISE MEDIAN N/CARPAL TUNNEL SURG Left 2017    LEFT  CARPAL TUNNEL RELEASE performed by Tab Sweeney MD at [address] BY,5+A/T,SUZME N/A 2018    TONSILLECTOMY      as child    TUNNELED VENOUS PORT PLACEMENT Right 2021    [address] Rosalin Bence  2016    w/bx     UPPER GASTROINTESTINAL ENDOSCOPY N/A 2020    EGD possible biopsy performed by Cherelle Kent MD at [address] 2020    EGD PUSH ENTEROSCOPY performed by Peyton Murguia MD at [address] [place] [address] History    Marital status:      Spouse name: Aditya Hart    Number of children: 2    Years of education: 12    Highest education level: High school graduate   Occupational History    Occupation: Retired-   Tobacco Use    Smoking status: Former     Packs/day: 1.00     Years: 59.00     Pack years: 59.00     Types: Cigarettes     Start date: 2017     Quit date: 10/1/2020     Years since quittin.0    Smokeless tobacco: Never   Vaping Use    Vaping Use: Never used Substance and Sexual Activity    Alcohol use: Not Currently    Drug use: No    Sexual activity: Yes     Partners: Male   Other Topics Concern    Not on file   Social History Narrative    Grew up in [place]     Lives With:  [id number] Spouse    Type of Home: House    Home Layout: Two level, Performs ADL's on one level    Home Access: Stairs to enter with rails    Entrance Stairs - Number of Steps: 4    Bathroom Shower/Tub: Tub/Shower unit, Doors    Bathroom Equipment: Shower chair, Grab bars in [address]: Rolling walker, Cane, Oxygen(uses her O2 very seldom)    ADL Assistance: Needs assistance    Homemaking Assistance: Needs assistance    Homemaking Responsibilities: No(spouse performs)    Ambulation Assistance: Independent    Transfer Assistance: Independent    Active : No    Occupation: Retired    Type of occupation: worked in [employer]: patient enjoys [employer]     Social Determinants of Health     Financial Resource Strain: Low Risk     Difficulty of Paying Living Expenses: Not hard at [employer] Insecurity: No Food Insecurity    Worried About [address] in the Last Year: Never true    [address] in the Last Year: Never true   Transportation Needs: Not on file   Physical Activity: Not on file   Stress: Not on file   Social Connections: Not on file   Intimate Partner Violence: Not on file   Housing Stability: Not on file     Family History   Problem Relation Age of Onset    Heart Disease Father         cardiac bypass    Arthritis Father     Arthritis Mother     Other Mother          at age 80    Other Sister         [facility]    No Known Problems Daughter     Stroke Son      Allergies   Allergen Reactions    Ibuprofen Nausea Only    Metformin And Related      Diarrhea      Darvon [Propoxyphene Hcl] Nausea And Vomiting       Current Outpatient Medications   Medication Sig Dispense Refill    nystatin (MYCOSTATIN) 935077 UNIT/GM powder Apply topically 4 times daily. 1 each 3    Handicap Placard MISC by Does not apply route Good from 10/6/22 till 10/6/27 1 each 0    carvedilol (COREG) 25 MG tablet TAKE 1 & 1/2 (ONE & ONE-HALF) TABLETS BY MOUTH TWICE DAILY WITH MEALS 270 tablet 0    rosuvastatin (CRESTOR) 40 MG tablet TAKE 1 TABLET BY MOUTH ONCE DAILY IN THE EVENING 90 tablet 0    digoxin (LANOXIN) 125 MCG tablet TAKE 1 TABLET BY MOUTH EVERY OTHER DAY 45 tablet 0    pantoprazole (PROTONIX) 40 MG tablet Take 1 tablet by mouth every morning (before breakfast) 90 tablet 3    ACCU-CHEK PHYLLIS PLUS strip Test 3x daily Dx E11.65 100 each 3    insulin lispro (HUMALOG) 100 UNIT/ML SOLN injection vial INJECT SUBCUTANEOUSLY THREE TIMES DAILY- 24 UNITS BEFORE LUNCH, 24 UNITS MIDDAY SNACK, AND INCREASE TO 36 UNITS BEFORE DINNER 30 mL 3    Continuous Blood Gluc Sensor (FREESTYLE MURALI 2 SENSOR) [facility] 1 Device by Does not apply route every 14 days 2 each 3    Continuous Blood Gluc  (FREESTYLE MURALI 2 READER) [place] 1 Device by Does not apply route 4 times daily (before meals and nightly) 1 each 0    insulin glargine (LANTUS) 100 UNIT/ML injection vial INJECT 75 UNITS AT BEDTIME, 20 UNITS IN THE MORNING  .  E11.69 40 mL 3    fluocinonide (LIDEX) 0.05 % external solution Apply topically 2 times daily.  60 mL 0    dextromethorphan-quiNIDine (NUEDEXTA) 20-10 MG CAPS per capsule Take 1 capsule by mouth daily (Patient taking differently: Take 1 capsule by mouth 2 times daily) 30 capsule 3    nitrofurantoin (MACRODANTIN) 50 MG capsule One po daily 90 capsule 3    ferrous sulfate (IRON 325) 325 (65 Fe) MG tablet Take 1 tablet by mouth every other day 30 tablet 3    amLODIPine (NORVASC) 5 MG tablet Take 1 tablet by mouth daily 90 tablet 3    glucose 4 g chewable tablet Take 4 tablets by mouth as needed for Low blood sugar 60 tablet 3    Glucagon (GVOKE HYPOPEN 2-PACK) 1 MG/0.2ML SOAJ Inject 1 mg into the skin every 15 minutes as needed (glucose less tahtn 70 or if symptomatic) 2 each 3    albuterol-ipratropium (COMBIVENT RESPIMAT)  MCG/ACT AERS inhaler Inhale 1 puff into the lungs every 6 hours as needed for Wheezing or Shortness of Breath 4 g 5    ipratropium-albuterol (DUONEB) 0.5-2.5 (3) MG/3ML SOLN nebulizer solution Inhale 3 mLs into the lungs 3 times daily 360 mL 0    Cranberry 500 MG CAPS Take 500 mg by mouth daily      Cholecalciferol (VITAMIN D3) 50 MCG (2000 UT) CAPS Take 1,000 Units by mouth 2 times daily       lidocaine-prilocaine (EMLA) 2.5-2.5 % cream APPLY CREAM TOPICALLY TO PORT SITE ONE HOUR PRIOR TO APPOINTMENT AS NEEDED      Handicap Placard MISC by Does not apply route Good from 7/15/21 - 7/15/26 1 each 0    Accu-Chek Softclix Lancets MISC bid 100 each 3    Blood Glucose Monitoring Suppl (ACCU-CHEK PHYLLIS CONNECT) w/Device KIT 1 kit by Does not apply route daily 1 kit 0     No current facility-administered medications for this visit. Review of Systems   Constitutional:  Negative for fever. HENT:  Negative for ear pain, tinnitus and trouble swallowing. Eyes:  Negative for photophobia and visual disturbance. Respiratory:  Negative for choking and shortness of breath. Cardiovascular:  Negative for chest pain and palpitations. Gastrointestinal:  Negative for nausea and vomiting. Musculoskeletal:  Negative for back pain, gait problem, joint swelling, myalgias, neck pain and neck stiffness. Skin:  Negative for color change. Allergic/Immunologic: Negative for food allergies. Neurological:  Negative for dizziness, tremors, seizures, syncope, facial asymmetry, speech difficulty, weakness, light-headedness, numbness and headaches. Psychiatric/Behavioral:  Negative for behavioral problems, confusion, hallucinations and sleep disturbance. Objective:   /68 (Site: Right Upper Arm, Position: Sitting, Cuff Size: Medium Adult)   Pulse 82   Wt 151 lb (68.5 kg)   LMP  (LMP Unknown)   BMI 30.50 kg/m²     Physical Exam  Vitals reviewed.    Eyes: Pupils: Pupils are equal, round, and reactive to light. Cardiovascular:      Rate and Rhythm: Normal rate and regular rhythm. Heart sounds: No murmur heard. Pulmonary:      Effort: Pulmonary effort is normal.      Breath sounds: Normal breath sounds. Abdominal:      General: Bowel sounds are normal.   Musculoskeletal:         General: Normal range of motion. Cervical back: Normal range of motion. Skin:     General: Skin is warm. Neurological:      Mental Status: She is alert and oriented to person, place, and time. Cranial Nerves: No cranial nerve deficit. Sensory: No sensory deficit. Motor: No abnormal muscle tone. Coordination: Coordination normal.      Deep Tendon Reflexes: Reflexes are normal and symmetric. Babinski sign absent on the right side. Babinski sign absent on the left side. Psychiatric:         Mood and Affect: Mood normal.       No results found.     Lab Results   Component Value Date/Time    WBC 6.9 05/18/2022 10:56 AM    RBC 3.94 05/18/2022 10:56 AM    HGB 10.8 05/18/2022 10:56 AM    HCT 33.9 05/18/2022 10:56 AM    MCV 85.9 05/18/2022 10:56 AM    MCH 27.4 05/18/2022 10:56 AM    MCHC 31.9 05/18/2022 10:56 AM    RDW 16.9 05/18/2022 10:56 AM     05/18/2022 10:56 AM    MPV 8.8 08/01/2015 09:33 AM     Lab Results   Component Value Date/Time     05/18/2022 10:56 AM    K 4.4 05/18/2022 10:56 AM    K 4.1 05/11/2022 06:00 AM     05/18/2022 10:56 AM    CO2 19 05/18/2022 10:56 AM    BUN 28 05/18/2022 10:56 AM    CREATININE 1.89 05/18/2022 10:56 AM    GFRAA 31.1 05/18/2022 10:56 AM    LABGLOM 25.7 05/18/2022 10:56 AM    GLUCOSE 240 07/14/2022 01:43 PM    PROT 6.8 05/18/2022 10:56 AM    LABALBU 4.0 05/18/2022 10:56 AM    CALCIUM 9.6 05/18/2022 10:56 AM    BILITOT 0.3 05/18/2022 10:56 AM    ALKPHOS 115 05/18/2022 10:56 AM    AST 18 05/18/2022 10:56 AM    ALT 17 05/18/2022 10:56 AM     Lab Results   Component Value Date/Time    PROTIME 16.7 05/10/2022 12:51 PM    INR 1.4 05/10/2022 12:51 PM     Lab Results   Component Value Date/Time    TSH 0.474 11/30/2020 11:19 AM    PAFJUFZX24 1255 11/30/2020 11:19 AM    FOLATE 11.1 11/30/2020 11:19 AM    FERRITIN 21 05/10/2022 04:41 PM    IRON 25 05/10/2022 04:41 PM    TIBC 372 05/10/2022 04:41 PM     Lab Results   Component Value Date/Time    TRIG 183 04/07/2022 05:29 AM    HDL 33 04/07/2022 05:29 AM    LDLCALC 36 04/07/2022 05:29 AM     Lab Results   Component Value Date/Time    LABAMPH Neg 07/07/2019 02:30 PM    BARBSCNU Neg 07/07/2019 02:30 PM    LABBENZ Neg 07/07/2019 02:30 PM    OPIATESCREENURINE Neg 07/07/2019 02:30 PM    PHENCYCLIDINESCREENURINE Neg 07/07/2019 02:30 PM    ETOH <10 03/18/2021 04:15 PM     No results found for: LITHIUM, DILFRTOT, VALPROATE    Assessment:       Diagnosis Orders   1. Brainstem stroke ([facility]        2. EFRAIN (internuclear ophthalmoplegia), right        3. Memory deficit        4. Ataxia        5. Pseudobulbar affect        6. Emotional lability        Brainstem stroke with ataxia. Patient actually is doing well except that she has broken through pseudobulbar affect. We were called regarding the same and we had prescribed her new docs which she took initially 1 tablet and now she is on twice and is helping. The cost appears to be quite a bit and therefore we were able to give her some samples. In the event that she is not able to get it and asked that we may to consider antidepressants though they do not work the same way and has side effects. She otherwise is doing well she is not any falls and uses a walker very well. She has good care at home as well. She has memory issues secondary to the stroke as well as underlying risk factors for vascular disease. Plan:      No orders of the defined types were placed in this encounter. No orders of the defined types were placed in this encounter. No follow-ups on file.       Trevor Ward MD

## 2022-10-12 NOTE — PROGRESS NOTES
Subjective:      Patient Id: Loan Whitt was seen at the clinic in the [facility] for follow-up palliative medicine visit. She was accompanied to the appointment by: spouse, Keeley and daughter, Eusebio Roberson Complaint   Patient presents with    Pain     Bilat hands, mostly at night- has been happening for several months- uses icey hot, fingers numb 8/10    Follow-up     Outbursts of crying     Other        HPI       Domonique Leonard is a 65 y.o. female with a complex medical history that includes GI bleeding throughout the distal small intestine, anxiety, asthma, CHF, COPD, CKD depression, fibromyalgia, OA, cardiac arrest, seizures, DM II, recent CVA, and sleep apnea. General: Patient remains alert and in NAD. CVA: Had recent stroke in April. MRI from 4/7/22 showed new small right CVA. Not currently on anticoagulation due to increased risk for bleeding due to AVM malformation. COPD/asthma: Occasional SOB on exertion. Breathing \"okay\" but still gets \"tired\" and has limited mobility. Patient has intermittent cough and states that the phlegm is not visualized as she swallows it. CHF: Minimal leg swelling/fluid retention. No chest pain. Following with Dr. Florencia Alvarez and cardiology. Hand pain/neuropathy: Patient currently reports hand pain  that is an 8/10 and occurs sporadically. Tried voltaren gel, icy hot, and Tylenol. Skin: Patient states she has area of skin concern under left breast. Reports that the area gets irritated and itches. Mood/anxiety/depression: Patient with intermittent \"crying spells. \" Currently following with neurology Dr. Pedro López. Taking Nuedexta BID per . Diagnosed with pseudobulbar affect. No suicidal thoughts or ideation. Sleep: Patient states that her sleep disturbance has improved as she has incorporated a \"sleep schedule\". T2DM: Follows with Dr. Alberto Wise and Antonella Signs with Endocrinology. Patient has upcoming appt w/Valentin Aparicio.  Patient has been more compliant with diet and exercise. Patient is using freestyle Marilin. Most recent HgbA1C at 8.5 which is significant improvement. Past Medical History:   Diagnosis Date    Adenomatous polyp of sigmoid colon     Adenomatous polyp of transverse colon     Anxiety     Asthma     dx 2019 / has smoked since age 11    Brainstem stroke ([facility]     Cardiopulmonary arrest ([address]     CHF (congestive heart failure) (HCC)     Chronic back pain     Bilateral L5 S1 Radic on emg--surprisingly worse on the left than the right--pt's symptoms and her MRI show worse on the right    Chronic obstructive pulmonary disease with acute exacerbation ([facility] 10/12/2019    Depression     ESBL E. coli carrier     Carrier. Fibromyalgia     Gastrointestinal hemorrhage 2/24/2020    Hyperlipidemia     meds > 8 yrs    Hypertension     meds > 45 yrs    Insomnia 12/4/2013    On home O2     2l per n/c at bedtime mostly,     Osteoarthritis     Seizures ([facility]     Sepsis ([facility] 10/6/2020    Smoker 6/18/2013    Type II diabetes mellitus, uncontrolled     hx > 8 yrs    Unspecified sleep apnea      Past Surgical History:   Procedure Laterality Date    BACK SURGERY  2017    lumbar disc    CARDIAC CATHETERIZATION  11/03/2014    DR. MIRELES / no stents    COLONOSCOPY  08/29/2016    w/polypectomy     COLONOSCOPY N/A 09/29/2020    COLONOSCOPY WITH POLYPECTOMY performed by Tammi Goodrich MD at [address] N/A 10/07/2019    EUA HYSTEROSCOPY DILATATION AND CURETTAGE performed by Cosmo Mark DO at [address] [place] [address] Way with IOL OU / [address]  [address]    umbilical hernia repair    IR PORT PLACEMENT EQUAL OR GREATER THAN 5 YEARS  5/14/2021    IR PORT PLACEMENT EQUAL OR GREATER THAN 5 YEARS 5/14/2021 [facility] SPECIAL PROCEDURE    HI ESOPHAGOGASTRODUODENOSCOPY TRANSORAL DIAGNOSTIC N/A 03/24/2017    EGD ESOPHAGOGASTRODUODENOSCOPY performed by Lisandro Corral MD at [facility]    OK LAP,DIAGNOSTIC ABDOMEN N/A 2018    negative findings    OK REVISE MEDIAN N/CARPAL TUNNEL SURG Left 2017    LEFT  CARPAL TUNNEL RELEASE performed by Josefina Clemente MD at [address] [facility] AOZA,9+I/T,SMYEF N/A 2018    TONSILLECTOMY      as child    TUNNELED VENOUS PORT PLACEMENT Right 2021    [address] Dr[address] Terrence Butler  2016    w/bx     UPPER GASTROINTESTINAL ENDOSCOPY N/A 2020    EGD possible biopsy performed by Hedy Johnson MD at [address] [facility] N/A 2020    EGD PUSH ENTEROSCOPY performed by Moishe Seip, MD at [address] [facility] History    Marital status:      Spouse name: Edgar Montesinos    Number of children: 2    Years of education: 12    Highest education level: High school graduate   Occupational History    Occupation: Retired-   Tobacco Use    Smoking status: Former     Packs/day: 1.00     Years: 59.00     Pack years: 59.00     Types: Cigarettes     Start date: 2017     Quit date: 10/1/2020     Years since quittin.0    Smokeless tobacco: Never   Vaping Use    Vaping Use: Never used   Substance and Sexual Activity    Alcohol use: Not Currently    Drug use: No    Sexual activity: Yes     Partners: Male   Other Topics Concern    Not on file   Social History Narrative    Grew up in [address] [place] [address] With:  Librado Spouse    Type of Home: House    Home Layout: Two level, Performs ADL's on one level    Home Access: Stairs to enter with rails    Entrance Stairs - Number of Steps: 4    Bathroom Shower/Tub: Tub/Shower unit, Doors    Bathroom Equipment: Shower chair, Grab bars in [place]: Rolling walker, Cane, Oxygen(uses her O2 very seldom)    ADL Assistance: Needs assistance    Homemaking Assistance: Needs assistance    Homemaking Responsibilities: No(spouse performs)    Ambulation Assistance: Independent    Transfer Assistance: Independent    Active : No    Occupation: Retired    Type of occupation: worked in [employer]: patient enjoys televsision     Social Determinants of Health     Financial Resource Strain: Low Risk     Difficulty of Paying Living Expenses: Not hard at all   Food Insecurity: No Food Insecurity    Worried About Running Out of Food in the Last Year: Never true    Ran Out of Food in the Last Year: Never true   Transportation Needs: Not on file   Physical Activity: Not on file   Stress: Not on file   Social Connections: Not on file   Intimate Partner Violence: Not on file   Housing Stability: Not on file     Family History   Problem Relation Age of Onset    Heart Disease Father         cardiac bypass    Arthritis Father     Arthritis Mother     Other Mother          at age 80    Other Sister         [facility]    No Known Problems Daughter     Stroke Son      Allergies   Allergen Reactions    Ibuprofen Nausea Only    Metformin And Related      Diarrhea      Darvon [Propoxyphene Hcl] Nausea And Vomiting     Current Outpatient Medications on File Prior to Visit   Medication Sig Dispense Refill    [facility] [address] [facility] by Does not apply route Good from 10/6/22 till 10/6/27 1 each 0    carvedilol (COREG) 25 MG tablet TAKE 1 & 1/2 (ONE & ONE-HALF) TABLETS BY MOUTH TWICE DAILY WITH MEALS 270 tablet 0    rosuvastatin (CRESTOR) 40 MG tablet TAKE 1 TABLET BY MOUTH ONCE DAILY IN THE EVENING 90 tablet 0    digoxin (LANOXIN) 125 MCG tablet TAKE 1 TABLET BY MOUTH EVERY OTHER DAY 45 tablet 0    pantoprazole (PROTONIX) 40 MG tablet Take 1 tablet by mouth every morning (before breakfast) 90 tablet 3    ACCU-CHEK PHYLLIS PLUS strip Test 3x daily Dx E11.65 100 each 3    insulin lispro (HUMALOG) 100 UNIT/ML SOLN injection vial INJECT SUBCUTANEOUSLY THREE TIMES DAILY- 24 UNITS BEFORE LUNCH, 24 UNITS MIDDAY SNACK, AND INCREASE TO 36 UNITS BEFORE DINNER 30 mL 3 less tahtn 70 or if symptomatic) 2 each 3     No current facility-administered medications on file prior to visit. Review of Systems   Constitutional:  Positive for fatigue. Negative for activity change, appetite change and unexpected weight change. HENT:  Positive for congestion (per daughter and patient) and trouble swallowing (occasional cough after drinking). Negative for hearing loss. Eyes:  Positive for visual disturbance (decreased vision). Negative for discharge. Respiratory:  Positive for cough (intermittent) and shortness of breath (with exertion, chronic). Negative for choking and wheezing. Cardiovascular:  Negative for chest pain, palpitations and leg swelling. Gastrointestinal:  Negative for abdominal pain, blood in stool, constipation, diarrhea, nausea and vomiting. Musculoskeletal:  Negative for arthralgias, back pain, gait problem and myalgias. Skin:  Positive for color change (under left breast/flank) and rash (left breast raised area). Negative for wound. Neurological:  Positive for weakness and numbness (BUE/hands). Negative for dizziness, tremors, speech difficulty and light-headedness. Psychiatric/Behavioral:  Positive for dysphoric mood (intermittent crying spells). Negative for confusion and sleep disturbance. The patient is not nervous/anxious. Objective:   BP (!) 145/61 (Site: Right Upper Arm, Position: Sitting, Cuff Size: Medium Adult)   Pulse 74   Temp 97.9 °F (36.6 °C) (Temporal)   Resp 17   Wt 155 lb (70.3 kg)   LMP  (LMP Unknown)   SpO2 96%   BMI 31.31 kg/m²    Wt Readings from Last 3 Encounters:   10/12/22 151 lb (68.5 kg)   10/12/22 155 lb (70.3 kg)   08/23/22 147 lb (66.7 kg)     Physical Exam  Constitutional:       General: She is not in acute distress. Appearance: She is obese. HENT:      Head: Normocephalic and atraumatic. Nose: No congestion or rhinorrhea.       Mouth/Throat:      Mouth: Mucous membranes are moist.   Eyes: Extraocular Movements: Extraocular movements intact. Pupils: Pupils are equal, round, and reactive to light. Cardiovascular:      Rate and Rhythm: Normal rate and regular rhythm. Pulses: Normal pulses. Heart sounds: Murmur heard. Pulmonary:      Effort: Pulmonary effort is normal.      Breath sounds: Normal breath sounds. No wheezing or rhonchi. Abdominal:      General: Bowel sounds are normal.      Palpations: Abdomen is soft. Tenderness: There is no abdominal tenderness. Musculoskeletal:      Cervical back: Normal range of motion and neck supple. Right lower leg: Edema (trace) present. Left lower leg: No edema. Skin:     General: Skin is warm and dry. Findings: Rash present. No bruising. Rash is urticarial.             Comments: Raised, well defined area that appeared fungal   Neurological:      Mental Status: She is alert and oriented to person, place, and time. Motor: Weakness present. Gait: Gait abnormal (quick shuffled gait). Psychiatric:         Attention and Perception: Attention normal.         Mood and Affect: Mood normal. Affect is labile and tearful. Behavior: Behavior normal.      Comments: Patient able to interact normally but was tearful during appointment     Assessment and Plan:     1. Pain in both hands  2. Diabetic autonomic neuropathy associated with type 2 diabetes mellitus ([facility]  Encouraged patient to utilize Voltaren gel up to 4 times daily. Could be arthritic or neuropathic/combination of both. Will consider adding gabapentin cream or medication if over-the-counter treatment does not work. Blood sugar regulation and diabetic medication adherence improved with freestyle roderick. Patient is currently following with endocrinology. 3. Chronic kidney disease, unspecified CKD stage  Avoid nephrotoxic agents. 4. Chronic combined systolic and diastolic CHF (congestive heart failure) (HCC)  Stable.  Monitor weight daily, call if you gain 3 lbs in one day or 5 lbs. in one week or for increased edema, sob, cough, orthopnea, or chest pain. Patient following with cardiology. 5. Chronic fatigue  6. Sleep disturbance  Improved. Sleeping through the night and less sleeping during the day. 7. Chronic obstructive pulmonary disease, unspecified COPD type ([facility]  Stable. Continue COPD directed therapies. Call for increased SOB, cough, sputum production, excessive fatigue, fever, chills, or myalgias. Activity as tolerated. 8. Pseudobulbar affect  9. Emotional lability  No suicidal ideation. Still continues to have crying spells. Nuedexta managed by neurology. 10. Fungal dermatitis  - nystatin (MYCOSTATIN) 120246 UNIT/GM powder; Apply topically 4 times daily. Dispense: 1 each; Refill: 3    11. Dysphagia, unspecified type  Advised patient and  that if coughing increases after drinking to please call. Will continue to monitor and and consider speech therapy assessment if patient displays increased signs of dysphagia with drink or food. 12. Palliative care encounter  Discussed symptom management related to chronic disease/condition. Provided emotional support and active listening. Patient understands and is agreeable to current plan. Due to acuity, symptomatology and high-risk medication management, I advised patient to Return in about 3 months (around 1/12/2023). Due to scheduling availability within the department      Total Time 40 minutes     Total time includes reviewing labs and tests, reviewing history, medications, and allergies, counseling patient, performing medically appropriate evaluation and examination, ordering medications, and documenting in the medical record.      JOHNATHON Eduardo - CNP    Collaborating physician: Dr. Prema Ruano

## 2022-10-18 PROBLEM — M79.642 PAIN IN BOTH HANDS: Status: ACTIVE | Noted: 2022-10-18

## 2022-10-18 PROBLEM — M79.641 PAIN IN BOTH HANDS: Status: ACTIVE | Noted: 2022-10-18

## 2022-10-18 PROBLEM — B36.9 FUNGAL DERMATITIS: Status: ACTIVE | Noted: 2022-10-18

## 2022-10-18 PROBLEM — E11.43 DIABETIC AUTONOMIC NEUROPATHY ASSOCIATED WITH TYPE 2 DIABETES MELLITUS (HCC): Status: ACTIVE | Noted: 2022-10-18

## 2022-10-18 PROBLEM — R13.10 DYSPHAGIA: Status: ACTIVE | Noted: 2022-10-18

## 2022-10-18 ASSESSMENT — ENCOUNTER SYMPTOMS: TROUBLE SWALLOWING: 1

## 2022-10-25 ENCOUNTER — TELEPHONE (OUTPATIENT)
Dept: PALLATIVE CARE | Age: 77
End: 2022-10-25

## 2022-10-25 NOTE — TELEPHONE ENCOUNTER
[address] [place] [address] called in to explain that Jimi Verdugo has been having diarrhea the past few days. She has been going 2-3 times per day. I asked if any distinct odor/color he reports no just looks like normal diarrhea. He has been giving her Kaopectate and he says it does seem to be slowing down a bit. He just wanted to run it by you. No nausea/vomiting/fever. He will continue with the current med for a few days and call back if it doesn't get better unless you had other recommendation.

## 2022-10-26 ENCOUNTER — SCHEDULED TELEPHONE ENCOUNTER (OUTPATIENT)
Dept: CARDIOLOGY CLINIC | Age: 77
End: 2022-10-26
Payer: MEDICARE

## 2022-10-26 DIAGNOSIS — R09.89 BILATERAL CAROTID BRUITS: ICD-10-CM

## 2022-10-26 DIAGNOSIS — I95.2 HYPOTENSION DUE TO DRUGS: ICD-10-CM

## 2022-10-26 DIAGNOSIS — I50.41 ACUTE COMBINED SYSTOLIC AND DIASTOLIC CHF, NYHA CLASS 1 (HCC): ICD-10-CM

## 2022-10-26 DIAGNOSIS — K92.2 GASTROINTESTINAL HEMORRHAGE, UNSPECIFIED GASTROINTESTINAL HEMORRHAGE TYPE: ICD-10-CM

## 2022-10-26 DIAGNOSIS — R20.0 BILATERAL HAND NUMBNESS: ICD-10-CM

## 2022-10-26 DIAGNOSIS — I16.0 HYPERTENSIVE URGENCY: ICD-10-CM

## 2022-10-26 DIAGNOSIS — I25.10 CORONARY ARTERY DISEASE INVOLVING NATIVE CORONARY ARTERY OF NATIVE HEART WITHOUT ANGINA PECTORIS: ICD-10-CM

## 2022-10-26 DIAGNOSIS — M79.642 BILATERAL HAND PAIN: ICD-10-CM

## 2022-10-26 DIAGNOSIS — M79.641 BILATERAL HAND PAIN: ICD-10-CM

## 2022-10-26 DIAGNOSIS — I10 ESSENTIAL HYPERTENSION: ICD-10-CM

## 2022-10-26 DIAGNOSIS — N18.4 STAGE 4 CHRONIC KIDNEY DISEASE (HCC): Primary | ICD-10-CM

## 2022-10-26 DIAGNOSIS — E78.2 MIXED HYPERLIPIDEMIA: ICD-10-CM

## 2022-10-26 DIAGNOSIS — I42.1 HOCM (HYPERTROPHIC OBSTRUCTIVE CARDIOMYOPATHY) (HCC): ICD-10-CM

## 2022-10-26 PROCEDURE — 1123F ACP DISCUSS/DSCN MKR DOCD: CPT | Performed by: INTERNAL MEDICINE

## 2022-10-26 PROCEDURE — 99214 OFFICE O/P EST MOD 30 MIN: CPT | Performed by: INTERNAL MEDICINE

## 2022-10-26 ASSESSMENT — ENCOUNTER SYMPTOMS
STRIDOR: 0
SHORTNESS OF BREATH: 0
NAUSEA: 0
EYES NEGATIVE: 1
COUGH: 0
CHEST TIGHTNESS: 0
RESPIRATORY NEGATIVE: 1
BLOOD IN STOOL: 0
GASTROINTESTINAL NEGATIVE: 1
WHEEZING: 0

## 2022-10-26 NOTE — PROGRESS NOTES
Subsequent Progress Note  Patient: Kenji Mae  YOB: 1945  MRN: [id number]    Chief Complaint: htn HF SOB DM HOCM  Chief Complaint   Patient presents with    Coronary Artery Disease       CV Data:  3/2019 Echo EF 79% no KYLE   10/8/2019 Cath CX 20-30 LVEF 95%   10/2020 Echo EF 55 Moderate CLVH  10/5/2020 Cath LAD 50-60   3/21 Echo EF 50 Severe LVH 1-2+ MR RVSP 31   0/58 LICA >43  Peak Velocity  233 m/s WU 50-69    Subjective/HPI: she stopped Verapamil 3 days ago due to severe fatigue and weakness. Feels better now. No cp no sob no bleed. C/o nocturnal leg cramps    3/27/2020 Patient and/or health care decision maker is aware that that he may receive a bill for this telephone service, depending on his insurance coverage, and has provided verbal consent to proceed. This visit was completed via telephone. Time spent on the phone with patient 18 minutes. She was to f/u with Dr. Brandi Mercado but was on my VV schedule today. No CP no SOB. Still has occasional night time leg cramps. She is walking and compliant with meds. No LE edema. 1/15/21 recent multiple hospitalization. She was intubated and in shock. Had TVP for Bradycardia but stabilized and did not require PPM.      3/11/21 TELEHEALTH EVALUATION -- Audio/Visual (During [place] [address] public health emergency)    No cp no sob no falls no bleed. Not walking much. Poor appetite.  /104. .. ran out of Verapamil and Clonidine. 3/18/21 Patient and/or health care decision maker is aware that that he/she may receive a bill for this telephone service, depending on his insurance coverage, and has provided verbal consent to proceed. This visit was completed via telephone. Total time 14 minutes. Had been doing well. This AM she took extra full dose Torsemde 50 mg because she thought she was not uriniating enough.  called in due to her BP being 79/56. She is awake and alert sitting. No cp no sob.  Just feels weak and tired     4/16/21 TELEHEALTH EVALUATION -- Audio/Visual (During [place]-81 public health emergency)    Recent resp failure intubated. No cp no sob no falls no bleed Bp stable. Her voice is hoarse from ET tube and swelling. She has no dysphagia    6/1/21 feels better. Recent hosp had intestinal bleed and taken off ASA>  She will have denat extraction o 6/26. She will stay off ASA for now. 8/6/21 legs weak. Walk little with lobo. No falls no bleed. Eeats well. Sleeps too much. Always Early AM BP elevated. 11/5/21 not very active having sob no falls no bleed. occ CP at night. occ dizzy    4/28/22 doing well no cp no sob no falls. No further bleed. Recent pontine cva. plavix was stopped and low dose Eliquis started. Pt cries often now since CVA. 7/26/22 both hands hurt no cp no sob no fall sno bleed. Take smeds. 10/26/22 Patient and/or health care decision maker is aware that that he/she may receive a bill for this telephone service, depending on his insurance coverage, and has provided verbal consent to proceed. This visit was completed via telephone. Total time 16 minutes. Hand hurt no change. No cp no sob no falls nobleed walks little. No neuro symptoms. EKG: SR67    Past Medical History:   Diagnosis Date    Adenomatous polyp of sigmoid colon     Adenomatous polyp of transverse colon     Anxiety     Asthma     dx 2019 / has smoked since age 11    Brainstem stroke ([facility] [address]     Cardiopulmonary arrest ([address]     CHF (congestive heart failure) ([facility])     Chronic back pain     Bilateral L5 S1 Radic on emg--surprisingly worse on the left than the right--pt's symptoms and her MRI show worse on the right    Chronic obstructive pulmonary disease with acute exacerbation ([address] 10/12/2019    Depression     ESBL E. coli carrier     Carrier.     Fibromyalgia     Gastrointestinal hemorrhage 2/24/2020    Hyperlipidemia     meds > 8 yrs    Hypertension     meds > 45 yrs    Insomnia 12/4/2013    On home O2     2l per n/c at bedtime mostly,     Osteoarthritis     Seizures ([facility] [address]     Sepsis ([facility] [address] 10/6/2020    Smoker 6/18/2013    Type II diabetes mellitus, uncontrolled     hx > 8 yrs    Unspecified sleep apnea        Past Surgical History:   Procedure Laterality Date    BACK SURGERY  2017    lumbar disc    CARDIAC CATHETERIZATION  11/03/2014    DR. MIRELES / no stents    COLONOSCOPY  08/29/2016    w/polypectomy     COLONOSCOPY N/A 09/29/2020    COLONOSCOPY WITH POLYPECTOMY performed by Viviana Cunningham MD at [address] N/A 10/07/2019    EUA HYSTEROSCOPY DILATATION AND CURETTAGE performed by Luiza Reid DO at [address] [facility], COLON, DIAGNOSTIC      EYE SURGERY      Phaco with IOL OU / [address] [place] [address]    umbilical hernia repair    IR PORT PLACEMENT EQUAL OR GREATER THAN 5 YEARS  5/14/2021    IR PORT PLACEMENT EQUAL OR GREATER THAN 5 YEARS 5/14/2021 MLOZ SPECIAL PROCEDURE    [place] ESOPHAGOGASTRODUODENOSCOPY TRANSORAL DIAGNOSTIC N/A 03/24/2017    EGD ESOPHAGOGASTRODUODENOSCOPY performed by Rodrigue Benz MD at [place] [address] 02/08/2018    negative findings    [place] REVISE MEDIAN N/CARPAL TUNNEL SURG Left 06/05/2017    LEFT  CARPAL TUNNEL RELEASE performed by Daniela Richardson MD at [address] [facility] HNST,9+R/Q,SOOKB N/A 02/08/2018    TONSILLECTOMY      as child    TUNNELED VENOUS PORT PLACEMENT Right 05/14/2021    [employer] [address] [employer] Port ClearVUE by Dr. Savanah Campuzano  08/26/2016    w/bx     UPPER GASTROINTESTINAL ENDOSCOPY N/A 02/25/2020    EGD possible biopsy performed by Viviana Cunningham MD at [address] [facility] N/A 07/01/2020    EGD PUSH ENTEROSCOPY performed by Ellen Lipscomb MD at [facility]       Family History   Problem Relation Age of Onset    Heart Disease Father         cardiac bypass    Arthritis Father     Arthritis Mother Other Mother          at age 80    Other Sister         ganesh [facility]    No Known Problems Daughter     Stroke Son        Social History     Socioeconomic History    Marital status:      Spouse name: Reece Ryan    Number of children: 2    Years of education: 12    Highest education level: High school graduate   Occupational History    Occupation: Retired-   Tobacco Use    Smoking status: Former     Packs/day: 1.00     Years: 59.00     Pack years: 59.00     Types: Cigarettes     Start date: 2017     Quit date: 10/1/2020     Years since quittin.0    Smokeless tobacco: Never   Vaping Use    Vaping Use: Never used   Substance and Sexual Activity    Alcohol use: Not Currently    Drug use: No    Sexual activity: Yes     Partners: Male   Social History Narrative    Grew up in [address] [place] [address] With:  Reece Ryan Spouse    Type of Home: House    Home Layout: Two level, Performs ADL's on one level    Home Access: Stairs to enter with rails    Entrance Stairs - Number of Steps: 4    Bathroom Shower/Tub: Tub/Shower unit, Doors    Bathroom Equipment: Shower chair, Grab bars in [address] [place]: Rolling walker, Cane, Oxygen(uses her O2 very seldom)    ADL Assistance: Needs assistance    Homemaking Assistance: Needs assistance    Homemaking Responsibilities: No(spouse performs)    Ambulation Assistance: Independent    Transfer Assistance: Independent    Active : No    Occupation: Retired    Type of occupation: worked in [employer]: patient enjoys [employer]     Social Determinants of Health     Financial Resource Strain: Low Risk     Difficulty of Paying Living Expenses: Not hard at [employer] Insecurity: No Food Insecurity    Worried About [address] in the Last Year: Never true    [facility] in the Last Year: Never true       Allergies   Allergen Reactions    Ibuprofen Nausea Only    Metformin And Related      Diarrhea      Darvon [Propoxyphene Hcl] Nausea And Vomiting       Current Outpatient Medications   Medication Sig Dispense Refill    nystatin (MYCOSTATIN) 194074 UNIT/GM powder Apply topically 4 times daily. 1 each 3    Handicap Placard MISC by Does not apply route Good from 10/6/22 till 10/6/27 1 each 0    carvedilol (COREG) 25 MG tablet TAKE 1 & 1/2 (ONE & ONE-HALF) TABLETS BY MOUTH TWICE DAILY WITH MEALS 270 tablet 0    rosuvastatin (CRESTOR) 40 MG tablet TAKE 1 TABLET BY MOUTH ONCE DAILY IN THE EVENING 90 tablet 0    digoxin (LANOXIN) 125 MCG tablet TAKE 1 TABLET BY MOUTH EVERY OTHER DAY 45 tablet 0    pantoprazole (PROTONIX) 40 MG tablet Take 1 tablet by mouth every morning (before breakfast) 90 tablet 3    ACCU-CHEK PHYLLIS PLUS strip Test 3x daily Dx E11.65 100 each 3    insulin lispro (HUMALOG) 100 UNIT/ML SOLN injection vial INJECT SUBCUTANEOUSLY THREE TIMES DAILY- 24 UNITS BEFORE LUNCH, 24 UNITS MIDDAY SNACK, AND INCREASE TO 36 UNITS BEFORE DINNER 30 mL 3    Continuous Blood Gluc Sensor (FREESTYLE MURALI 2 SENSOR) [facility] 1 Device by Does not apply route every 14 days 2 each 3    Continuous Blood Gluc  (FREESTYLE MURALI 2 READER) [place] 1 Device by Does not apply route 4 times daily (before meals and nightly) 1 each 0    insulin glargine (LANTUS) 100 UNIT/ML injection vial INJECT 75 UNITS AT BEDTIME, 20 UNITS IN THE MORNING  .  E11.69 40 mL 3    fluocinonide (LIDEX) 0.05 % external solution Apply topically 2 times daily.  60 mL 0    dextromethorphan-quiNIDine (NUEDEXTA) 20-10 MG CAPS per capsule Take 1 capsule by mouth daily (Patient taking differently: Take 1 capsule by mouth 2 times daily) 30 capsule 3    nitrofurantoin (MACRODANTIN) 50 MG capsule One po daily 90 capsule 3    ferrous sulfate (IRON 325) 325 (65 Fe) MG tablet Take 1 tablet by mouth every other day 30 tablet 3    amLODIPine (NORVASC) 5 MG tablet Take 1 tablet by mouth daily 90 tablet 3    glucose 4 g chewable tablet Take 4 tablets by mouth as needed for Low blood sugar 60 tablet 3 Glucagon (Cloretta Skillern 2-PACK) 1 MG/0.2ML SOAJ Inject 1 mg into the skin every 15 minutes as needed (glucose less tahtn 70 or if symptomatic) 2 each 3    albuterol-ipratropium (COMBIVENT RESPIMAT)  MCG/ACT AERS inhaler Inhale 1 puff into the lungs every 6 hours as needed for Wheezing or Shortness of Breath 4 g 5    ipratropium-albuterol (DUONEB) 0.5-2.5 (3) MG/3ML SOLN nebulizer solution Inhale 3 mLs into the lungs 3 times daily 360 mL 0    Cranberry 500 MG CAPS Take 500 mg by mouth daily      Cholecalciferol (VITAMIN D3) 50 MCG (2000 UT) CAPS Take 1,000 Units by mouth 2 times daily       lidocaine-prilocaine (EMLA) 2.5-2.5 % cream APPLY CREAM TOPICALLY TO PORT SITE ONE HOUR PRIOR TO APPOINTMENT AS NEEDED      Handicap Placard MISC by Does not apply route Good from 7/15/21 - 7/15/26 1 each 0    Accu-Chek Softclix Lancets MISC bid 100 each 3    Blood Glucose Monitoring Suppl (ACCU-CHEK PHYLLIS CONNECT) w/Device KIT 1 kit by Does not apply route daily 1 kit 0     No current facility-administered medications for this visit. Review of Systems:   Review of Systems   Constitutional: Negative. Negative for diaphoresis and fatigue. HENT: Negative. Eyes: Negative. Respiratory: Negative. Negative for cough, chest tightness, shortness of breath, wheezing and stridor. Cardiovascular: Negative. Negative for chest pain, palpitations and leg swelling. Gastrointestinal: Negative. Negative for blood in stool and nausea. Genitourinary: Negative. Musculoskeletal: Negative. Skin: Negative. Neurological: Negative. Negative for dizziness, syncope, weakness and light-headedness. Hematological: Negative. Psychiatric/Behavioral: Negative.          Physical Examination:    LMP  (LMP Unknown)    Physical Exam    LABS:  CBC:   Lab Results   Component Value Date/Time    WBC 6.9 05/18/2022 10:56 AM    RBC 3.94 05/18/2022 10:56 AM    HGB 10.8 05/18/2022 10:56 AM    HCT 33.9 05/18/2022 10:56 AM MCV 85.9 05/18/2022 10:56 AM    MCH 27.4 05/18/2022 10:56 AM    MCHC 31.9 05/18/2022 10:56 AM    RDW 16.9 05/18/2022 10:56 AM     05/18/2022 10:56 AM    MPV 8.8 08/01/2015 09:33 AM     Lipids:  Lab Results   Component Value Date    CHOL 106 04/07/2022    CHOL 143 11/30/2020    CHOL 105 02/24/2020     Lab Results   Component Value Date    TRIG 183 (H) 04/07/2022    TRIG 64 11/30/2020    TRIG 67 02/24/2020     Lab Results   Component Value Date    HDL 33 (L) 04/07/2022    HDL 61 (H) 11/30/2020    HDL 42 02/24/2020     Lab Results   Component Value Date    LDLCALC 36 04/07/2022    LDLCALC 69 11/30/2020    LDLCALC 50 02/24/2020     No results found for: LABVLDL, VLDL  Lab Results   Component Value Date    CHOLHDLRATIO 3.4 09/11/2012     CMP:    Lab Results   Component Value Date/Time     05/18/2022 10:56 AM    K 4.4 05/18/2022 10:56 AM    K 4.1 05/11/2022 06:00 AM     05/18/2022 10:56 AM    CO2 19 05/18/2022 10:56 AM    BUN 28 05/18/2022 10:56 AM    CREATININE 1.89 05/18/2022 10:56 AM    GFRAA 31.1 05/18/2022 10:56 AM    LABGLOM 25.7 05/18/2022 10:56 AM    GLUCOSE 240 07/14/2022 01:43 PM    PROT 6.8 05/18/2022 10:56 AM    LABALBU 4.0 05/18/2022 10:56 AM    CALCIUM 9.6 05/18/2022 10:56 AM    BILITOT 0.3 05/18/2022 10:56 AM    ALKPHOS 115 05/18/2022 10:56 AM    AST 18 05/18/2022 10:56 AM    ALT 17 05/18/2022 10:56 AM     BMP:    Lab Results   Component Value Date/Time     05/18/2022 10:56 AM    K 4.4 05/18/2022 10:56 AM    K 4.1 05/11/2022 06:00 AM     05/18/2022 10:56 AM    CO2 19 05/18/2022 10:56 AM    BUN 28 05/18/2022 10:56 AM    LABALBU 4.0 05/18/2022 10:56 AM    CREATININE 1.89 05/18/2022 10:56 AM    CALCIUM 9.6 05/18/2022 10:56 AM    GFRAA 31.1 05/18/2022 10:56 AM    LABGLOM 25.7 05/18/2022 10:56 AM    GLUCOSE 240 07/14/2022 01:43 PM     Magnesium:    Lab Results   Component Value Date/Time    MG 2.3 04/06/2022 02:30 PM     TSH:  Lab Results   Component Value Date    TSH 0.474 11/30/2020       Patient Active Problem List   Diagnosis    Hypertension    Hyperlipidemia    Fibromyalgia    Anxiety    Depression    DJD (degenerative joint disease), lumbar    Lumbosacral radiculopathy at S1    Dysphonia    CTS (carpal tunnel syndrome)    High risk medication use-Norco - 12/20/17 OARRS PM&R, 02/20/18 OARRS PM&R, 03/07/18 OARRS PM&R, Urine Drug screen negative 02/06/17 PM&R--MED CONTRACT 2/6/17    SOB (shortness of breath) on exertion    Memory deficit    Chronic UTI    Artificial lens present    Presbyopia    Astigmatism, regular    Cataract, nuclear sclerotic senile    Regular astigmatism    Nuclear senile cataract    Lumbosacral radiculopathy at L5    Spinal stenosis of lumbar region with neurogenic claudication    Impaired mobility and activities of daily living    Diabetic asymmetric polyneuropathy (HCC)    Myalgia    Acute combined systolic and diastolic CHF, NYHA class 1 ([address]    Uncontrolled type 2 diabetes mellitus with hyperglycemia (HCC)    Chronic obstructive pulmonary disease (HCC)    HOCM (hypertrophic obstructive cardiomyopathy) (HCC)    Anemia    AVM (arteriovenous malformation)    Anemia of chronic renal failure    Dysarthria    Chronic fatigue    Major depressive disorder in remission ([address]    Gastroesophageal reflux disease without esophagitis    Acute cystitis with hematuria    Gait abnormality    Late effects of CVA (cerebrovascular accident)    Acute respiratory failure with hypoxia ([address]    Palliative care encounter    Bradycardia    Moderate hypoxic-ischemic encephalopathy    Infection due to ESBL-producing Escherichia coli    Port-A-Cath in place    Chronic kidney disease    History of GI bleed    Chronic right-sided thoracic back pain    Seizures (HCC)    Yeast infection    Stage 3b chronic kidney disease ([address]    Diabetic renal disease (HCC)    Hypervolemia    Chronic kidney disease, stage 4 (severe) ([address]    Other microscopic hematuria    Chronic combined systolic and diastolic CHF (congestive heart failure) (HCC)    Auditory hallucinations    Emotional lability    Hyperglycemia    Sleep disturbance    EFRAIN (internuclear ophthalmoplegia), right    Brainstem stroke (HCC)    Symptomatic anemia    Claustrophobia    Ataxia    Pseudobulbar affect    Dysphagia    Fungal dermatitis    Diabetic autonomic neuropathy associated with type 2 diabetes mellitus (HCC)    Pain in both hands       There are no discontinued medications. Modified Medications    No medications on file       No orders of the defined types were placed in this encounter. Assessment/Plan:    1. Acute combined systolic and diastolic CHF, NYHA class 1 ([facility])   compensated   - Basic Metabolic Panel; Future    2. Essential hypertension   not controlled in AM-- will give Amlodipine 5 mg at [facility]. 3. Mixed hyperlipidemia - statin rx. Low fat diet f/u labs     4. HOCM (hypertrophic obstructive cardiomyopathy) ([facility])  Continue BB ACEI. And Dig.     5. Hypotension- stable now    6. Resp failure- stable. Need Therapy. Need to walk daily. Eat well. Take meds. 7. Intestinal Bleed- stay off ASA- if Hb stable will consider resume in future. 8. Recent CVA 4/7/22 - pontine- stable. 9. Carotid -  LICA > 70 by CUS but velocity is only 233. We will repeat CUS. 10. B/L Hand and wrist numbness and pain. - probable carpal tunnel - refer to ortho     Counseling:  Heart Healthy Lifestyle, Low Salt Diet, Take Precautions to Prevent Falls and Walk Daily    Return in about 4 months (around 2/26/2023).       Electronically signed by Emily Lopez MD on 10/26/2022 at 2:32 PM

## 2022-10-28 ENCOUNTER — HOSPITAL ENCOUNTER (OUTPATIENT)
Dept: INFUSION THERAPY | Age: 77
Setting detail: INFUSION SERIES
Discharge: HOME | End: 2022-10-28
Payer: MEDICARE

## 2022-10-28 ENCOUNTER — OFFICE VISIT (OUTPATIENT)
Dept: ORTHOPEDIC SURGERY | Age: 77
End: 2022-10-28
Payer: MEDICARE

## 2022-10-28 VITALS
HEART RATE: 75 BPM | OXYGEN SATURATION: 98 % | TEMPERATURE: 97.7 F | WEIGHT: 151 LBS | HEIGHT: 59 IN | BODY MASS INDEX: 30.44 KG/M2

## 2022-10-28 VITALS
RESPIRATION RATE: 18 BRPM | TEMPERATURE: 98.4 F | HEART RATE: 80 BPM | SYSTOLIC BLOOD PRESSURE: 142 MMHG | DIASTOLIC BLOOD PRESSURE: 65 MMHG

## 2022-10-28 DIAGNOSIS — G56.03 BILATERAL CARPAL TUNNEL SYNDROME: Primary | ICD-10-CM

## 2022-10-28 DIAGNOSIS — Z95.828 PORT-A-CATH IN PLACE: Primary | ICD-10-CM

## 2022-10-28 PROCEDURE — 96523 IRRIG DRUG DELIVERY DEVICE: CPT

## 2022-10-28 PROCEDURE — 6360000002 HC RX W HCPCS: Performed by: NURSE PRACTITIONER

## 2022-10-28 PROCEDURE — 99203 OFFICE O/P NEW LOW 30 MIN: CPT | Performed by: ORTHOPAEDIC SURGERY

## 2022-10-28 PROCEDURE — 1123F ACP DISCUSS/DSCN MKR DOCD: CPT | Performed by: ORTHOPAEDIC SURGERY

## 2022-10-28 PROCEDURE — 2580000003 HC RX 258: Performed by: NURSE PRACTITIONER

## 2022-10-28 RX ORDER — HEPARIN SODIUM (PORCINE) LOCK FLUSH IV SOLN 100 UNIT/ML 100 UNIT/ML
500 SOLUTION INTRAVENOUS PRN
Status: DISCONTINUED | OUTPATIENT
Start: 2022-10-28 | End: 2022-10-29 | Stop reason: HOSPADM

## 2022-10-28 RX ORDER — SODIUM CHLORIDE 0.9 % (FLUSH) 0.9 %
5-40 SYRINGE (ML) INJECTION PRN
Status: DISCONTINUED | OUTPATIENT
Start: 2022-10-28 | End: 2022-10-29 | Stop reason: HOSPADM

## 2022-10-28 RX ORDER — HEPARIN SODIUM (PORCINE) LOCK FLUSH IV SOLN 100 UNIT/ML 100 UNIT/ML
500 SOLUTION INTRAVENOUS PRN
OUTPATIENT
Start: 2022-11-18

## 2022-10-28 RX ORDER — WATER 1000 ML/1000ML
2.2 INJECTION, SOLUTION INTRAVENOUS ONCE
OUTPATIENT
Start: 2022-11-18 | End: 2022-11-18

## 2022-10-28 RX ORDER — SODIUM CHLORIDE 0.9 % (FLUSH) 0.9 %
5-40 SYRINGE (ML) INJECTION PRN
OUTPATIENT
Start: 2022-11-18

## 2022-10-28 RX ADMIN — SODIUM CHLORIDE, PRESERVATIVE FREE 10 ML: 5 INJECTION INTRAVENOUS at 14:18

## 2022-10-28 RX ADMIN — HEPARIN 500 UNITS: 100 SYRINGE at 14:18

## 2022-11-02 ENCOUNTER — OFFICE VISIT (OUTPATIENT)
Dept: ENDOCRINOLOGY | Age: 77
End: 2022-11-02
Payer: MEDICARE

## 2022-11-02 VITALS
SYSTOLIC BLOOD PRESSURE: 122 MMHG | HEART RATE: 74 BPM | BODY MASS INDEX: 30.64 KG/M2 | DIASTOLIC BLOOD PRESSURE: 71 MMHG | OXYGEN SATURATION: 97 % | HEIGHT: 59 IN | WEIGHT: 152 LBS

## 2022-11-02 DIAGNOSIS — E11.65 UNCONTROLLED TYPE 2 DIABETES MELLITUS WITH HYPERGLYCEMIA (HCC): ICD-10-CM

## 2022-11-02 DIAGNOSIS — Z79.4 TYPE 2 DIABETES MELLITUS WITH OTHER SPECIFIED COMPLICATION, WITH LONG-TERM CURRENT USE OF INSULIN (HCC): Primary | ICD-10-CM

## 2022-11-02 DIAGNOSIS — E11.69 TYPE 2 DIABETES MELLITUS WITH OTHER SPECIFIED COMPLICATION, WITH LONG-TERM CURRENT USE OF INSULIN (HCC): Primary | ICD-10-CM

## 2022-11-02 LAB
CHP ED QC CHECK: ABNORMAL
GLUCOSE BLD-MCNC: 131 MG/DL
HBA1C MFR BLD: 9 %

## 2022-11-02 PROCEDURE — 82962 GLUCOSE BLOOD TEST: CPT | Performed by: PHYSICIAN ASSISTANT

## 2022-11-02 PROCEDURE — 3074F SYST BP LT 130 MM HG: CPT | Performed by: PHYSICIAN ASSISTANT

## 2022-11-02 PROCEDURE — 1123F ACP DISCUSS/DSCN MKR DOCD: CPT | Performed by: PHYSICIAN ASSISTANT

## 2022-11-02 PROCEDURE — 3078F DIAST BP <80 MM HG: CPT | Performed by: PHYSICIAN ASSISTANT

## 2022-11-02 PROCEDURE — 3046F HEMOGLOBIN A1C LEVEL >9.0%: CPT | Performed by: PHYSICIAN ASSISTANT

## 2022-11-02 PROCEDURE — 99214 OFFICE O/P EST MOD 30 MIN: CPT | Performed by: PHYSICIAN ASSISTANT

## 2022-11-02 PROCEDURE — 83036 HEMOGLOBIN GLYCOSYLATED A1C: CPT | Performed by: PHYSICIAN ASSISTANT

## 2022-11-02 RX ORDER — LANCETS 30 GAUGE
EACH MISCELLANEOUS
Qty: 50 EACH | Refills: 3 | Status: SHIPPED | OUTPATIENT
Start: 2022-11-02

## 2022-11-02 RX ORDER — INSULIN GLARGINE 100 [IU]/ML
INJECTION, SOLUTION SUBCUTANEOUS
Qty: 40 ML | Refills: 3 | Status: SHIPPED | OUTPATIENT
Start: 2022-11-02

## 2022-11-02 RX ORDER — INSULIN LISPRO 100 [IU]/ML
INJECTION, SOLUTION INTRAVENOUS; SUBCUTANEOUS
Qty: 30 ML | Refills: 3 | Status: SHIPPED | OUTPATIENT
Start: 2022-11-02

## 2022-11-02 RX ORDER — INSULIN DETEMIR 100 [IU]/ML
INJECTION, SOLUTION SUBCUTANEOUS
Status: CANCELLED | OUTPATIENT
Start: 2022-11-02

## 2022-11-02 RX ORDER — CIPROFLOXACIN AND DEXAMETHASONE 3; 1 MG/ML; MG/ML
SUSPENSION/ DROPS AURICULAR (OTIC)
COMMUNITY
Start: 2022-10-19

## 2022-11-02 RX ORDER — GLUCOSAMINE HCL/CHONDROITIN SU 500-400 MG
CAPSULE ORAL
Qty: 100 STRIP | Refills: 3 | Status: SHIPPED | OUTPATIENT
Start: 2022-11-02

## 2022-11-02 RX ORDER — FLASH GLUCOSE SENSOR
1 KIT MISCELLANEOUS
Qty: 2 EACH | Refills: 3 | Status: SHIPPED | OUTPATIENT
Start: 2022-11-02

## 2022-11-02 ASSESSMENT — ENCOUNTER SYMPTOMS
COUGH: 0
VOMITING: 0
SHORTNESS OF BREATH: 0
SINUS PRESSURE: 0
WHEEZING: 0
NAUSEA: 0
ABDOMINAL PAIN: 0
RHINORRHEA: 0
EYE PAIN: 0
SORE THROAT: 0
EYE REDNESS: 0
DIARRHEA: 0

## 2022-11-02 NOTE — PROGRESS NOTES
2022    Assessment:       Diagnosis Orders   1. Type 2 diabetes mellitus with other specified complication, with long-term current use of insulin (HCC)  Comprehensive Metabolic Panel    Hemoglobin A1C    POCT Glucose    POCT glycosylated hemoglobin (Hb A1C)    insulin glargine (LANTUS) 100 UNIT/ML injection vial      2.  Uncontrolled type 2 diabetes mellitus with hyperglycemia (HCC)  insulin lispro (HUMALOG) 100 UNIT/ML SOLN injection vial        AVG am glucose range 250-300    PLAN:     Continue Lantus inject 75 units at bedtime 11:00 PM   Continue Lantus 20 units in the morning  11:00 AM  Continue Humalog 26 units before Breakfast and lunch ( first meal 12-2) , 26 units mid-day snack, Increase 38 units before dinner (third meal 6-8)  Check blood glucose 4 times daily and document  Freestyle Marilin ordered- [address]   Follow up in 3 months    Orders Placed This Encounter   Procedures    Comprehensive Metabolic Panel     Standing Status:   Future     Standing Expiration Date:   2023    Hemoglobin A1C     Standing Status:   Future     Standing Expiration Date:   2023    POCT Glucose    POCT glycosylated hemoglobin (Hb A1C)     Orders Placed This Encounter   Medications    insulin lispro (HUMALOG) 100 UNIT/ML SOLN injection vial     Sig: INJECT SUBCUTANEOUSLY THREE TIMES DAILY- 24 UNITS BEFORE LUNCH, 24 UNITS MIDDAY SNACK, AND INCREASE TO 36 UNITS BEFORE DINNER     Dispense:  30 mL     Refill:  3    Continuous Blood Gluc Sensor (FREESTYLE MARILIN 2 SENSOR) [facility]     Si Device by Does not apply route every 14 days     Dispense:  2 each     Refill:  3    glucose 4 g chewable tablet     Sig: Take 4 tablets by mouth as needed for Low blood sugar     Dispense:  60 tablet     Refill:  3    blood glucose monitor kit and supplies     Sig: Give 1 meter covered by insurance     Dispense:  1 kit     Refill:  0    blood glucose monitor strips     Sig: Test 2x daily     Dispense:  100 strip     Refill:  3    Lancets MISC Sig: Pt test 2x daily     Dispense:  50 each     Refill:  3    insulin glargine (LANTUS) 100 UNIT/ML injection vial     Sig: INJECT 75 UNITS AT BEDTIME, 30 UNITS IN THE MORNING  .  E11.69     Dispense:  40 mL     Refill:  3    Ostomy Supplies (SKIN TAC ADHESIVE BARRIER WIPE) MISC     Si packet by Does not apply route once a week     Dispense:  50 each     Refill:  5     No follow-ups on file. Subjective:     Chief Complaint   Patient presents with    Follow-up    Diabetes     Vitals:    22 1427   BP: 122/71   Site: Left Upper Arm   Pulse: 74   SpO2: 97%   Weight: 152 lb (68.9 kg)   Height: 4' 11\" (1.499 m)     Wt Readings from Last 3 Encounters:   22 152 lb (68.9 kg)   10/28/22 151 lb (68.5 kg)   10/12/22 151 lb (68.5 kg)     BP Readings from Last 3 Encounters:   22 122/71   10/28/22 (!) 142/65   10/12/22 132/68     Patient 21-year-old type II diabetic female with improving  glycemic control. Diabetes  She presents for her follow-up diabetic visit. She has type 2 diabetes mellitus. The initial diagnosis of diabetes was made 30 years ago. Her disease course has been worsening. Pertinent negatives for hypoglycemia include no dizziness or headaches. Associated symptoms include fatigue. Pertinent negatives for diabetes include no chest pain, no polydipsia (improving) and no polyuria. Hypoglycemia complications include hospitalization, required assistance and required glucagon injection. Symptoms are stable. Diabetic complications include heart disease. Pertinent negatives for diabetic complications include no CVA. Risk factors for coronary artery disease include diabetes mellitus and dyslipidemia. Current diabetic treatment includes insulin injections. She is compliant with treatment all of the time. She is currently taking insulin pre-breakfast, pre-lunch, pre-dinner and at bedtime. Insulin injections are given by patient and adult caretaker.  Rotation sites for injection include the abdominal wall and arms. She is following a generally healthy diet. When asked about meal planning, she reported none. She never participates in exercise. Her home blood glucose trend is increasing steadily. Her overall blood glucose range is >200 mg/dl. An ACE inhibitor/angiotensin II receptor blocker is being taken. She does not see a podiatrist.Eye exam is current. Past Medical History:   Diagnosis Date    Adenomatous polyp of sigmoid colon     Adenomatous polyp of transverse colon     Anxiety     Asthma     dx 2019 / has smoked since age 11    Brainstem stroke ([address]     Cardiopulmonary arrest ([address]     CHF (congestive heart failure) ([facility])     Chronic back pain     Bilateral L5 S1 Radic on emg--surprisingly worse on the left than the right--pt's symptoms and her MRI show worse on the right    Chronic obstructive pulmonary disease with acute exacerbation ([address] 10/12/2019    Depression     ESBL E. coli carrier     Carrier. Fibromyalgia     Gastrointestinal hemorrhage 2/24/2020    Hyperlipidemia     meds > 8 yrs    Hypertension     meds > 45 yrs    Insomnia 12/4/2013    On home O2     2l per n/c at bedtime mostly,     Osteoarthritis     Seizures ([address]     Sepsis ([facility] 10/6/2020    Smoker 6/18/2013    Type II diabetes mellitus, uncontrolled     hx > 8 yrs    Unspecified sleep apnea      Past Surgical History:   Procedure Laterality Date    BACK SURGERY  2017    lumbar disc    CARDIAC CATHETERIZATION  11/03/2014    DR. MIRELES / no stents    COLONOSCOPY  08/29/2016    w/polypectomy     COLONOSCOPY N/A 09/29/2020    COLONOSCOPY WITH POLYPECTOMY performed by Tulio Barroso MD at [address] N/A 10/07/2019    EUA HYSTEROSCOPY DILATATION AND CURETTAGE performed by Peggy Lopez DO at [address] [facility], COLON, DIAGNOSTIC      EYE SURGERY      [place] with IOL OU / [address]  [address]    umbilical hernia repair    IR PORT PLACEMENT EQUAL OR GREATER THAN 5 YEARS  2021    IR PORT PLACEMENT EQUAL OR GREATER THAN 5 YEARS 2021 MLOZ SPECIAL PROCEDURE    WA ESOPHAGOGASTRODUODENOSCOPY TRANSORAL DIAGNOSTIC N/A 2017    EGD ESOPHAGOGASTRODUODENOSCOPY performed by Rashaad Nogueira MD at [facility] 2018    negative findings    WA REVISE MEDIAN N/CARPAL TUNNEL SURG Left 2017    LEFT  CARPAL TUNNEL RELEASE performed by Cyndee Bridges MD at [address] [facility],2+E/T,VNVRV N/A 2018    TONSILLECTOMY      as child    TUNNELED VENOUS PORT PLACEMENT Right 2021    [employer] [address] [employer] Port ClearVUE by Dr. Ariadna Leonard  2016    w/bx     UPPER GASTROINTESTINAL ENDOSCOPY N/A 2020    EGD possible biopsy performed by Juan Jose Bertrand MD at [address] [facility] [address] N/A 2020    EGD PUSH ENTEROSCOPY performed by Kylee Jackson MD at [address] [facility] History    Marital status:      Spouse name: Miguelina    Number of children: 2    Years of education: 12    Highest education level: High school graduate   Occupational History    Occupation: Retired-   Tobacco Use    Smoking status: Former     Packs/day: 1.00     Years: 59.00     Pack years: 59.00     Types: Cigarettes     Start date: 2017     Quit date: 10/1/2020     Years since quittin.0    Smokeless tobacco: Never   Vaping Use    Vaping Use: Never used   Substance and Sexual Activity    Alcohol use: Not Currently    Drug use: No    Sexual activity: Yes     Partners: Male   Other Topics Concern    Not on file   Social History Narrative    Grew up in [place]     Lives With:  Miguelina Spouse    Type of Home: House    Home Layout: Two level, Performs ADL's on one level    Home Access: Stairs to enter with rails    Entrance Stairs - Number of Steps: 4    Bathroom Shower/Tub: Tub/Shower unit, Doors    [facility] Equipment: Shower chair, Grab bars in [address] [place]: Rolling walker, Cane, Oxygen(uses her O2 very seldom)    ADL Assistance: Needs assistance    Homemaking Assistance: Needs assistance    Homemaking Responsibilities: No(spouse performs)    Ambulation Assistance: Independent    Transfer Assistance: Independent    Active : No    Occupation: Retired    Type of occupation: worked in [employer]: patient enjoys [employer]     Social Determinants of Health     Financial Resource Strain: Low Risk     Difficulty of Paying Living Expenses: Not hard at all   Food Insecurity: No Food Insecurity    Worried About [address] [employer] in the Last Year: Never true    [address] [facility] of [facility] in the Last Year: Never true   Transportation Needs: Not on file   Physical Activity: Not on file   Stress: Not on file   Social Connections: Not on file   Intimate Partner Violence: Not on file   Housing Stability: Not on file     Family History   Problem Relation Age of Onset    Heart Disease Father         cardiac bypass    Arthritis Father     Arthritis Mother     Other Mother          at age 80    Other Sister         [facility]    No Known Problems Daughter     Stroke Son      Allergies   Allergen Reactions    Ibuprofen Nausea Only    Metformin And Related      Diarrhea      Darvon [Propoxyphene Hcl] Nausea And Vomiting       Current Outpatient Medications:     ciprofloxacin-dexamethasone (CIPRODEX) 0.3-0.1 % otic suspension, , Disp: , Rfl:     insulin lispro (HUMALOG) 100 UNIT/ML SOLN injection vial, INJECT SUBCUTANEOUSLY THREE TIMES DAILY- 24 UNITS BEFORE LUNCH, 24 UNITS MIDDAY SNACK, AND INCREASE TO 36 UNITS BEFORE DINNER, Disp: 30 mL, Rfl: 3    Continuous Blood Gluc Sensor (FREESTYLE MURALI 2 SENSOR) MISC, 1 Device by Does not apply route every 14 days, Disp: 2 each, Rfl: 3    glucose 4 g chewable tablet, Take 4 tablets by mouth as needed for Low blood sugar, Disp: 60 tablet, Rfl: 3    blood glucose monitor kit and supplies, Give 1 meter covered by insurance, Disp: 1 kit, Rfl: 0    blood glucose monitor strips, Test 2x daily, Disp: 100 strip, Rfl: 3    Lancets MISC, Pt test 2x daily, Disp: 50 each, Rfl: 3    insulin glargine (LANTUS) 100 UNIT/ML injection vial, INJECT 75 UNITS AT BEDTIME, 30 UNITS IN THE MORNING  .  E11.69, Disp: 40 mL, Rfl: 3    Ostomy Supplies (SKIN TAC ADHESIVE BARRIER WIPE) MISC, 1 packet by Does not apply route once a week, Disp: 50 each, Rfl: 5    nystatin (MYCOSTATIN) 089679 UNIT/GM powder, Apply topically 4 times daily. , Disp: 1 each, Rfl: 3    Handicap Placard MISC, by Does not apply route Good from 10/6/22 till 10/6/27, Disp: 1 each, Rfl: 0    carvedilol (COREG) 25 MG tablet, TAKE 1 & 1/2 (ONE & ONE-HALF) TABLETS BY MOUTH TWICE DAILY WITH MEALS, Disp: 270 tablet, Rfl: 0    rosuvastatin (CRESTOR) 40 MG tablet, TAKE 1 TABLET BY MOUTH ONCE DAILY IN THE EVENING, Disp: 90 tablet, Rfl: 0    digoxin (LANOXIN) 125 MCG tablet, TAKE 1 TABLET BY MOUTH EVERY OTHER DAY, Disp: 45 tablet, Rfl: 0    pantoprazole (PROTONIX) 40 MG tablet, Take 1 tablet by mouth every morning (before breakfast), Disp: 90 tablet, Rfl: 3    ACCU-CHEK PHYLLIS PLUS strip, Test 3x daily Dx E11.65, Disp: 100 each, Rfl: 3    Continuous Blood Gluc  (FREESTYLE MURALI 2 READER) [place], 1 Device by Does not apply route 4 times daily (before meals and nightly), Disp: 1 each, Rfl: 0    fluocinonide (LIDEX) 0.05 % external solution, Apply topically 2 times daily. , Disp: 60 mL, Rfl: 0    dextromethorphan-quiNIDine (NUEDEXTA) 20-10 MG CAPS per capsule, Take 1 capsule by mouth daily (Patient taking differently: Take 1 capsule by mouth 2 times daily), Disp: 30 capsule, Rfl: 3    nitrofurantoin (MACRODANTIN) 50 MG capsule, One po daily, Disp: 90 capsule, Rfl: 3    ferrous sulfate (IRON 325) 325 (65 Fe) MG tablet, Take 1 tablet by mouth every other day, Disp: 30 tablet, Rfl: 3    amLODIPine (NORVASC) 5 MG tablet, Take 1 tablet by mouth daily, Disp: 90 tablet, Rfl: 3    Glucagon (GVOKE HYPOPEN 2-PACK) 1 MG/0.2ML SOAJ, Inject 1 mg into the skin every 15 minutes as needed (glucose less tahtn 70 or if symptomatic), Disp: 2 each, Rfl: 3    albuterol-ipratropium (COMBIVENT RESPIMAT)  MCG/ACT AERS inhaler, Inhale 1 puff into the lungs every 6 hours as needed for Wheezing or Shortness of Breath, Disp: 4 g, Rfl: 5    ipratropium-albuterol (DUONEB) 0.5-2.5 (3) MG/3ML SOLN nebulizer solution, Inhale 3 mLs into the lungs 3 times daily, Disp: 360 mL, Rfl: 0    Cranberry 500 MG CAPS, Take 500 mg by mouth daily, Disp: , Rfl:     Cholecalciferol (VITAMIN D3) 50 MCG (2000 UT) CAPS, Take 1,000 Units by mouth 2 times daily , Disp: , Rfl:     lidocaine-prilocaine (EMLA) 2.5-2.5 % cream, APPLY CREAM TOPICALLY TO PORT SITE ONE HOUR PRIOR TO APPOINTMENT AS NEEDED, Disp: , Rfl:     Accu-Chek Softclix Lancets MISC, bid, Disp: 100 each, Rfl: 3    Blood Glucose Monitoring Suppl (ACCU-CHEK PHYLLIS CONNECT) w/Device KIT, 1 kit by Does not apply route daily, Disp: 1 kit, Rfl: 0  Lab Results   Component Value Date     05/18/2022    K 4.4 05/18/2022     05/18/2022    CO2 19 (L) 05/18/2022    BUN 28 (H) 05/18/2022    CREATININE 1.89 (H) 05/18/2022    GLUCOSE 131 (H) 11/02/2022    CALCIUM 9.6 05/18/2022    PROT 6.8 05/18/2022    LABALBU 4.0 05/18/2022    BILITOT 0.3 05/18/2022    ALKPHOS 115 05/18/2022    AST 18 05/18/2022    ALT 17 05/18/2022    LABGLOM 25.7 (L) 05/18/2022    GFRAA 31.1 (L) 05/18/2022    GLOB 2.8 05/18/2022     Lab Results   Component Value Date    WBC 6.9 05/18/2022    HGB 10.8 (L) 05/18/2022    HCT 33.9 (L) 05/18/2022    MCV 85.9 05/18/2022     (H) 05/18/2022     Lab Results   Component Value Date    LABA1C 9.0 (H) 11/02/2022    LABA1C 8.5 (H) 07/15/2022    LABA1C 12.8 (H) 04/07/2022     Lab Results   Component Value Date    HDL 33 (L) 04/07/2022    HDL 61 (H) 11/30/2020    HDL 42 02/24/2020    LDLCALC 36 04/07/2022    LDLCALC 69 11/30/2020    LDLCALC 50 02/24/2020    CHOL 106 04/07/2022    CHOL 143 11/30/2020    CHOL 105 02/24/2020    TRIG 183 (H) 04/07/2022    TRIG 64 11/30/2020    TRIG 67 02/24/2020     No results found for: TESTM  Lab Results   Component Value Date    TSH 0.474 11/30/2020    TSH 1.070 10/05/2020    TSH 1.200 10/13/2019    TSHREFLEX 0.986 05/18/2022    TSHREFLEX 0.777 08/27/2020     No results found for: TPOABS    Review of Systems   Constitutional:  Positive for fatigue. Negative for chills and fever. HENT:  Negative for congestion, ear pain, postnasal drip, rhinorrhea, sinus pressure and sore throat. Eyes:  Negative for pain and redness. Respiratory:  Negative for cough, shortness of breath and wheezing. Cardiovascular:  Negative for chest pain, palpitations and leg swelling. Gastrointestinal:  Negative for abdominal pain, diarrhea, nausea and vomiting. Endocrine: Negative for polydipsia (improving) and polyuria. Genitourinary:  Negative for difficulty urinating. Musculoskeletal:  Negative for arthralgias. Skin:  Negative for rash. Allergic/Immunologic: Negative for environmental allergies. Neurological:  Negative for dizziness and headaches. Hematological:  Negative for adenopathy. Psychiatric/Behavioral:  Negative for dysphoric mood. Objective:   Physical Exam  Constitutional:       General: She is not in acute distress. Appearance: She is not ill-appearing. HENT:      Head: Normocephalic. Nose: No congestion. Pulmonary:      Effort: Pulmonary effort is normal.   Musculoskeletal:      Cervical back: Normal range of motion. Neurological:      Mental Status: She is alert and oriented to person, place, and time. Psychiatric:         Mood and Affect: Mood normal.         Behavior: Behavior normal.         Thought Content:  Thought content normal.

## 2022-11-02 NOTE — PATIENT INSTRUCTIONS
Endocrinology-    Check your blood sugars 4 times a day, before meals and at night  Document these numbers in a blood glucose log and bring them with you to your follow-up appointment. If you are prescribed insulin, Do not take your mealtime insulin if your blood sugars less than 120   Call our office if you have blood sugars less than 80 or greater then 300 on two or more occasions  Call our office if you have any questions regarding your blood sugars or insulin dosing regiment  Signs of low blood sugar may include tremors, feeling shaky, sweating, dizziness, confusion and weakness. Check your blood sugar immediatly if you have any of these symptoms.      The plan as discussed at your appointment-   Continue Lantus inject 75 units at bedtime 11:00 PM   Continue Lantus 20 units in the morning  11:00 AM  Increase Humalog 26 units before Breakfast and lunch ( first meal 12-2) , 26 units mid-day snack, Increase 38 units before dinner (third meal 6-8)  Check blood glucose 4 times daily and document  Freestyle Marilin adrien- [facility]   Follow up in 3 months

## 2022-11-03 RX ORDER — DEXTROMETHORPHAN HYDROBROMIDE AND QUINIDINE SULFATE 20; 10 MG/1; MG/1
1 CAPSULE, GELATIN COATED ORAL 2 TIMES DAILY
Qty: 60 CAPSULE | Refills: 78 | COMMUNITY
Start: 2022-11-03

## 2022-11-14 DIAGNOSIS — E11.69 TYPE 2 DIABETES MELLITUS WITH OTHER SPECIFIED COMPLICATION, WITH LONG-TERM CURRENT USE OF INSULIN (HCC): Primary | ICD-10-CM

## 2022-11-14 DIAGNOSIS — Z79.4 TYPE 2 DIABETES MELLITUS WITH OTHER SPECIFIED COMPLICATION, WITH LONG-TERM CURRENT USE OF INSULIN (HCC): Primary | ICD-10-CM

## 2022-11-14 NOTE — TELEPHONE ENCOUNTER
Patient  called, his wife didn't get her roderick sensors. I spoke to [address] [place] [address] and when the test meter was ordered to the sensor order was moved . They are going to send patient her roderick sensor.  And the test meter needs to be order to other pharmacy

## 2022-11-15 RX ORDER — LANCETS 30 GAUGE
1 EACH MISCELLANEOUS DAILY
Qty: 100 EACH | Refills: 3 | Status: SHIPPED | OUTPATIENT
Start: 2022-11-15

## 2022-11-15 RX ORDER — GLUCOSAMINE HCL/CHONDROITIN SU 500-400 MG
CAPSULE ORAL
Qty: 100 STRIP | Refills: 3 | Status: SHIPPED | OUTPATIENT
Start: 2022-11-15

## 2022-11-16 ENCOUNTER — TELEPHONE (OUTPATIENT)
Dept: PALLATIVE CARE | Age: 77
End: 2022-11-16

## 2022-11-16 DIAGNOSIS — D64.9 ANEMIA, UNSPECIFIED TYPE: ICD-10-CM

## 2022-11-16 DIAGNOSIS — D64.9 ANEMIA, UNSPECIFIED TYPE: Primary | ICD-10-CM

## 2022-11-16 LAB
BASOPHILS ABSOLUTE: 0 K/UL (ref 0–0.2)
BASOPHILS RELATIVE PERCENT: 0.8 %
EOSINOPHILS ABSOLUTE: 0.2 K/UL (ref 0–0.7)
EOSINOPHILS RELATIVE PERCENT: 4.2 %
HCT VFR BLD CALC: 35.9 % (ref 37–47)
HEMOGLOBIN: 11.9 G/DL (ref 12–16)
LYMPHOCYTES ABSOLUTE: 0.9 K/UL (ref 1–4.8)
LYMPHOCYTES RELATIVE PERCENT: 18.4 %
MCH RBC QN AUTO: 28.5 PG (ref 27–31.3)
MCHC RBC AUTO-ENTMCNC: 33.2 % (ref 33–37)
MCV RBC AUTO: 85.8 FL (ref 79.4–94.8)
MONOCYTES ABSOLUTE: 0.3 K/UL (ref 0.2–0.8)
MONOCYTES RELATIVE PERCENT: 6.1 %
NEUTROPHILS ABSOLUTE: 3.6 K/UL (ref 1.4–6.5)
NEUTROPHILS RELATIVE PERCENT: 70.5 %
PDW BLD-RTO: 14.9 % (ref 11.5–14.5)
PLATELET # BLD: 356 K/UL (ref 130–400)
RBC # BLD: 4.18 M/UL (ref 4.2–5.4)
WBC # BLD: 5.1 K/UL (ref 4.8–10.8)

## 2022-11-16 NOTE — TELEPHONE ENCOUNTER
Pt  calling in stating that his wife has been feeling really down lately and believes her blood count/lipids are low. He states that this has happened before and he just wanted to make sure, so he is asking if you can place an order for labs so she can get some blood drawn.

## 2022-11-16 NOTE — TELEPHONE ENCOUNTER
Spoke back with the  he states that she isn't experiencing any of the symptoms you mention, she went through this before. Explained to him about the CBC and he said that will be fine for you to place that order, so they can get those labs drawn this afternoon.

## 2022-11-18 DIAGNOSIS — M31.6 GIANT CELL ARTERITIS (HCC): Primary | ICD-10-CM

## 2022-11-18 RX ORDER — PREDNISONE 20 MG/1
40 TABLET ORAL DAILY
Qty: 60 TABLET | Refills: 0 | Status: SHIPPED | OUTPATIENT
Start: 2022-11-18 | End: 2022-12-18

## 2022-11-21 NOTE — PROGRESS NOTES
Spoke to ophthalmologist in regards to concerns for GCA. Requires high dose steroids for 4 weeks. Marybel has multiple co morbidities including uncontrolled DM. Spoke to endocrinologist. Nimisha Shannon appointment to 11/23 and adjusted patients insulin per the recs of endocrinology.  is aware if sugar levels are high that she may need to go to the hospital for management. Waiting for patient to be scheduled for biopsy.

## 2022-11-28 ENCOUNTER — TELEMEDICINE (OUTPATIENT)
Dept: FAMILY MEDICINE CLINIC | Age: 77
End: 2022-11-28
Payer: MEDICARE

## 2022-11-28 DIAGNOSIS — E78.00 PURE HYPERCHOLESTEROLEMIA: ICD-10-CM

## 2022-11-28 DIAGNOSIS — H02.402 PTOSIS OF LEFT EYELID: Primary | ICD-10-CM

## 2022-11-28 DIAGNOSIS — I50.41 ACUTE COMBINED SYSTOLIC AND DIASTOLIC CHF, NYHA CLASS 1 (HCC): Primary | ICD-10-CM

## 2022-11-28 DIAGNOSIS — R51.9 TEMPORAL HEADACHE: ICD-10-CM

## 2022-11-28 PROCEDURE — 99213 OFFICE O/P EST LOW 20 MIN: CPT | Performed by: FAMILY MEDICINE

## 2022-11-28 PROCEDURE — 1123F ACP DISCUSS/DSCN MKR DOCD: CPT | Performed by: FAMILY MEDICINE

## 2022-11-28 RX ORDER — ROSUVASTATIN CALCIUM 40 MG/1
TABLET, COATED ORAL
Qty: 90 TABLET | Refills: 0 | Status: SHIPPED | OUTPATIENT
Start: 2022-11-28

## 2022-11-28 RX ORDER — DIGOXIN 125 MCG
125 TABLET ORAL EVERY OTHER DAY
Qty: 45 TABLET | Refills: 0 | Status: SHIPPED | OUTPATIENT
Start: 2022-11-28

## 2022-11-28 RX ORDER — CARVEDILOL 25 MG/1
TABLET ORAL
Qty: 270 TABLET | Refills: 0 | Status: SHIPPED | OUTPATIENT
Start: 2022-11-28

## 2022-11-28 RX ORDER — AMLODIPINE BESYLATE 5 MG/1
TABLET ORAL
Qty: 90 TABLET | Refills: 0 | Status: SHIPPED | OUTPATIENT
Start: 2022-11-28

## 2022-11-28 SDOH — ECONOMIC STABILITY: FOOD INSECURITY: WITHIN THE PAST 12 MONTHS, YOU WORRIED THAT YOUR FOOD WOULD RUN OUT BEFORE YOU GOT MONEY TO BUY MORE.: NEVER TRUE

## 2022-11-28 SDOH — ECONOMIC STABILITY: FOOD INSECURITY: WITHIN THE PAST 12 MONTHS, THE FOOD YOU BOUGHT JUST DIDN'T LAST AND YOU DIDN'T HAVE MONEY TO GET MORE.: NEVER TRUE

## 2022-11-28 ASSESSMENT — ENCOUNTER SYMPTOMS
EYE PAIN: 0
CHEST TIGHTNESS: 0
ANAL BLEEDING: 0
FACIAL SWELLING: 0
SHORTNESS OF BREATH: 0

## 2022-11-28 ASSESSMENT — SOCIAL DETERMINANTS OF HEALTH (SDOH): HOW HARD IS IT FOR YOU TO PAY FOR THE VERY BASICS LIKE FOOD, HOUSING, MEDICAL CARE, AND HEATING?: NOT HARD AT ALL

## 2022-11-28 NOTE — PROGRESS NOTES
2022    TELEHEALTH EVALUATION -- Audio/Visual (During [id number]-[id number] public health emergency)    HPI:    Michael Ren (:  1945) has requested an audio/video evaluation for the following concern(s):      Eye complaints and headache  Marybel was seen by ophthalmologist   At that time, patient was complaining of a temporal head pain   Ophthalmologist was concerned for GCA and contacted PCP to prescribe high dose prednisone for patient. Patient reports she did not start the medication due to ophthalmologist calling them back and told them to hold off on the medication. It was recommended by ophthalmologist to see the rheumatologist for elevated CRP . Family was also concerned her pain was secondary to a tooth infection. After an evaluation by the dentist, it is believe this is not the source. Review of Systems   Constitutional:  Negative for activity change, diaphoresis and fatigue. HENT:  Negative for facial swelling and nosebleeds. Eyes:  Negative for pain. Left eye drooping. Respiratory:  Negative for chest tightness and shortness of breath. Gastrointestinal:  Negative for anal bleeding. Endocrine: Negative for heat intolerance. Genitourinary:  Negative for dysuria and pelvic pain. Musculoskeletal:  Negative for myalgias. Allergic/Immunologic: Negative for immunocompromised state. Neurological:  Positive for headaches. Negative for syncope and light-headedness. Psychiatric/Behavioral:  Negative for behavioral problems and decreased concentration. Prior to Visit Medications    Medication Sig Taking?  Authorizing Provider   carvedilol (COREG) 25 MG tablet TAKE 1 & 1/2 (ONE & ONE-HALF) TABLETS BY MOUTH TWICE DAILY WITH MEALS Yes Rodolfo Vee MD   digoxin (LANOXIN) 125 MCG tablet TAKE 1 TABLET BY MOUTH EVERY OTHER DAY Yes Rodolfo Vee MD   amLODIPine (NORVASC) 5 MG tablet Take 1 tablet by mouth once daily Yes Rodolfo Vee MD   rosuvastatin (CRESTOR) 40 MG tablet TAKE 1 TABLET BY MOUTH ONCE DAILY IN THE EVENING  Ryan Guerrero MD   predniSONE (DELTASONE) 20 MG tablet Take 2 tablets by mouth daily Yes Sariah Kelly MD   blood glucose monitor kit and supplies Dispense one meter that insurance will cover. Yes DIANE Henderson   blood glucose monitor strips Test 3x daily E11.65 Yes DIANE Henderson   Lancets MISC 1 each by Does not apply route daily Yes DIANE Henderson   dextromethorphan-quiNIDine (NUEDEXTA) 20-10 MG CAPS per capsule Take 1 capsule by mouth in the morning and at bedtime 6 SAMPLES GIVEN:   LOT: [id number]  EXP: 01/23 Yes JOHNATHON Bahena CNP   ciprofloxacin-dexamethasone (CIPRODEX) 0.3-0.1 % otic suspension  Yes Heath Martines MD   insulin lispro (HUMALOG) 100 UNIT/ML SOLN injection vial INJECT SUBCUTANEOUSLY THREE TIMES DAILY- 24 UNITS BEFORE LUNCH, 24 UNITS MIDDAY SNACK, AND INCREASE TO 36 UNITS BEFORE DINNER Yes DIANE Henderson   Continuous Blood Gluc Sensor (FREESTYLE MURALI 2 SENSOR) [facility] 1 Device by Does not apply route every 14 days Yes DIANE Henderson   glucose 4 g chewable tablet Take 4 tablets by mouth as needed for Low blood sugar Yes DIANE Henderson   blood glucose monitor kit and supplies Give 1 meter covered by insurance Yes DIANE Henderson   blood glucose monitor strips Test 2x daily Yes DIANE Henderson   Lancets MISC Pt test 2x daily Yes DIANE Henderson   insulin glargine (LANTUS) 100 UNIT/ML injection vial INJECT 75 UNITS AT BEDTIME, 30 UNITS IN THE MORNING  .  E11.69 Yes DIANE Henderson   Ostomy Supplies (SKIN TAC ADHESIVE BARRIER WIPE) MISC 1 packet by Does not apply route once a week Yes DIANE Henderson   nystatin (MYCOSTATIN) 453527 UNIT/GM powder Apply topically 4 times daily.  Yes JOHNATHON Corbett CNP   Handicap Placard MISC by Does not apply route Good from 10/6/22 till 10/6/27 Yes Sariah Kelly MD   pantoprazole (PROTONIX) 40 MG tablet Take 1 tablet by mouth every morning (before breakfast) Yes JOHNATHON Gustafson CNP   ACCU-CHEK PHYLLIS PLUS strip Test 3x daily Dx E11.65 Yes DIANE Avila   Continuous Blood Gluc  (FREESTYLE MURALI 2 READER) [place] 1 Device by Does not apply route 4 times daily (before meals and nightly) Yes DIANE Avila   fluocinonide (LIDEX) 0.05 % external solution Apply topically 2 times daily.  Yes JOHNATHON Black CNP   dextromethorphan-quiNIDine (NUEDEXTA) 20-10 MG CAPS per capsule Take 1 capsule by mouth daily  Patient taking differently: Take 1 capsule by mouth 2 times daily Yes Skyla Tariq MD   nitrofurantoin (MACRODANTIN) 50 MG capsule One po daily Yes Auther Homans, MD   ferrous sulfate (IRON 325) 325 (65 Fe) MG tablet Take 1 tablet by mouth every other day Yes Td Dela Cruz MD   Glucagon (Kate Bal 2-PACK) 1 MG/0.2ML SOAJ Inject 1 mg into the skin every 15 minutes as needed (glucose less tahtn 70 or if symptomatic) Yes DIANE Avila   albuterol-ipratropium (COMBIVENT RESPIMAT)  MCG/ACT AERS inhaler Inhale 1 puff into the lungs every 6 hours as needed for Wheezing or Shortness of Breath Yes Ul. Nova Zhang MD   ipratropium-albuterol (DUONEB) 0.5-2.5 (3) MG/3ML SOLN nebulizer solution Inhale 3 mLs into the lungs 3 times daily Yes Ul. Nova Zhang MD   Cranberry 500 MG CAPS Take 500 mg by mouth daily Yes Historical Provider, MD   Cholecalciferol (VITAMIN D3) 50 MCG (2000 UT) CAPS Take 1,000 Units by mouth 2 times daily  Yes Historical Provider, MD   lidocaine-prilocaine (EMLA) 2.5-2.5 % cream APPLY CREAM TOPICALLY TO PORT SITE ONE HOUR PRIOR TO APPOINTMENT AS NEEDED Yes Historical Provider, MD   Accu-Chek Softclix Lancets MISC bid Yes Shawn Ware MD   Blood Glucose Monitoring Suppl (ACCU-CHEK PHYLLIS CONNECT) w/Device KIT 1 kit by Does not apply route daily Yes Shawn Ware MD       Social History     Tobacco Use    Smoking status: Former     Packs/day: 1.00     Years: 59.00     Pack years: 59.00     Types: Cigarettes     Start date: 2017     Quit date: 10/1/2020     Years since quittin.1    Smokeless tobacco: Never   Vaping Use    Vaping Use: Never used   Substance Use Topics    Alcohol use: Not Currently    Drug use: No        Allergies   Allergen Reactions    Ibuprofen Nausea Only    Metformin And Related      Diarrhea      Darvon [Propoxyphene Hcl] Nausea And Vomiting   ,   Past Medical History:   Diagnosis Date    Adenomatous polyp of sigmoid colon     Adenomatous polyp of transverse colon     Anxiety     Asthma     dx 2019 / has smoked since age 11    Brainstem stroke ([facility]     Cardiopulmonary arrest ([facility] [facility]     CHF (congestive heart failure) (HCC)     Chronic back pain     Bilateral L5 S1 Radic on emg--surprisingly worse on the left than the right--pt's symptoms and her MRI show worse on the right    Chronic obstructive pulmonary disease with acute exacerbation ([facility] [address] 10/12/2019    Depression     ESBL E. coli carrier     Carrier. Fibromyalgia     Gastrointestinal hemorrhage 2020    Hyperlipidemia     meds > 8 yrs    Hypertension     meds > 45 yrs    Insomnia 2013    On home O2     2l per n/c at bedtime mostly,     Osteoarthritis     Seizures ([facility] [address]     Sepsis ([facility] [address] 10/6/2020    Smoker 2013    Type II diabetes mellitus, uncontrolled     hx > 8 yrs    Unspecified sleep apnea    ,   Past Surgical History:   Procedure Laterality Date    BACK SURGERY  2017    lumbar disc    CARDIAC CATHETERIZATION  2014    DR. MIRELES / no stents    COLONOSCOPY  2016    w/polypectomy     COLONOSCOPY N/A 2020    COLONOSCOPY WITH POLYPECTOMY performed by Elaina Dueñas MD at [address] N/A 10/07/2019    EUA HYSTEROSCOPY DILATATION AND CURETTAGE performed by Naga Ross DO at [address] [facility], COLON, DIAGNOSTIC      EYE SURGERY      [place] with IOL OU / [address] [address]    umbilical hernia repair IR PORT PLACEMENT EQUAL OR GREATER THAN 5 YEARS  2021    IR PORT PLACEMENT EQUAL OR GREATER THAN 5 YEARS 2021 [facility] SPECIAL PROCEDURE    DE ESOPHAGOGASTRODUODENOSCOPY TRANSORAL DIAGNOSTIC N/A 2017    EGD ESOPHAGOGASTRODUODENOSCOPY performed by Aditi Richmond MD at [address] [facility] N/A 2018    negative findings    DE REVISE MEDIAN N/CARPAL TUNNEL SURG Left 2017    LEFT  CARPAL TUNNEL RELEASE performed by Wallace Guaman MD at [address] [place] [address] DHKZ,3+Y/T,YJRXH N/A 2018    TONSILLECTOMY      as child    TUNNELED VENOUS PORT PLACEMENT Right 2021    8 Fr [employer] Port ClearVUE by Dr. Sonu Ramires  2016    w/bx     UPPER GASTROINTESTINAL ENDOSCOPY N/A 2020    EGD possible biopsy performed by Simon Romo MD at [address] [facility] N/A 2020    EGD PUSH ENTEROSCOPY performed by Raysa Pa MD at [facility]   ,   Social History     Tobacco Use    Smoking status: Former     Packs/day: 1.00     Years: 59.00     Pack years: 59.00     Types: Cigarettes     Start date: 2017     Quit date: 10/1/2020     Years since quittin.1    Smokeless tobacco: Never   Vaping Use    Vaping Use: Never used   Substance Use Topics    Alcohol use: Not Currently    Drug use: No   ,   Family History   Problem Relation Age of Onset    Heart Disease Father         cardiac bypass    Arthritis Father     Arthritis Mother     Other Mother          at age 80    Other Sister         [facility]    No Known Problems Daughter     Stroke Son    ,   Immunization History   Administered Date(s) Administered    COVID-19, MODERNA BLUE border, Primary or Immunocompromised, (age 12y+), IM, 100 mcg/0.5mL 2021, 2021    COVID-19, PFIZER Bivalent BOOSTER, (age 12y+), IM, 30 mcg/0.3 mL dose 10/03/2022    Influenza Vaccine, unspecified formulation 2015 Influenza, AFLURIA (age 1 yrs+), FLUZONE, (age 10 mo+), MDV, 0.5mL 09/11/2015    Influenza, FLUARIX, FLULAVAL, FLUZONE (age 10 mo+) AND AFLURIA, (age 1 y+), PF, 0.5mL 10/16/2019    Influenza, FLUZONE (age 72 y+), High Dose, 0.7mL 09/17/2020, 09/17/2020, 10/01/2021, 10/01/2021    Influenza, High Dose (Fluzone 65 yrs and older) 10/12/2016, 10/23/2017    Pneumococcal Conjugate 13-valent ([id number]) 10/23/2017    Pneumococcal Polysaccharide ([id number]) 11/01/2010, 09/17/2020    Td (Adult), 5 Lf Tetanus Toxoid, Pf (Tenivac, Decavac) 01/20/2011    Td, unspecified formulation 01/20/2011   ,   Health Maintenance   Topic Date Due    Shingles vaccine (1 of 2) Never done    DEXA (modify frequency per FRAX score)  Never done    DTaP/Tdap/Td vaccine (1 - Tdap) 01/21/2011    Annual Wellness Visit (AWV)  09/03/2022    Lipids  04/07/2023    Colorectal Cancer Screen  09/29/2023    Depression Monitoring  10/12/2023    Flu vaccine  Completed    Pneumococcal 65+ years Vaccine  Completed    COVID-19 Vaccine  Completed    Hepatitis A vaccine  Aged Out    Hib vaccine  Aged Out    Meningococcal (ACWY) vaccine  Aged Out    Hepatitis C screen  Discontinued    Low dose CT lung screening  Discontinued       PHYSICAL EXAMINATION:  [ INSTRUCTIONS:  \"[x]\" Indicates a positive item  \"[]\" Indicates a negative item  -- DELETE ALL ITEMS NOT EXAMINED]  Vital Signs: (As obtained by patient/caregiver or practitioner observation)    Blood pressure-  Heart rate-    Respiratory rate-    Temperature-  Pulse oximetry-     Constitutional: [x] Appears well-developed and well-nourished [x] No apparent distress      [] Abnormal-   Mental status  [x] Alert and awake  [] Oriented to person/place/time []Able to follow commands      Eyes:  EOM    [x]  Normal  [] Abnormal-  Sclera  [x]  Normal  [] Abnormal -         Discharge []  None visible  [] Abnormal -    HENT:   [x] Normocephalic, atraumatic.   [] Abnormal   [] Mouth/Throat: Mucous membranes are moist. External Ears [x] Normal  [] Abnormal-     Neck: [x] No visualized mass     Pulmonary/Chest: [x] Respiratory effort normal.  [] No visualized signs of difficulty breathing or respiratory distress        [] Abnormal-      Musculoskeletal:   [x] Normal gait with no signs of ataxia         [] Normal range of motion of neck        [] Abnormal-       Neurological:        [x] No Facial Asymmetry (Cranial nerve 7 motor function) (limited exam to video visit)          [] No gaze palsy        [] Abnormal-         Skin:        [x] No significant exanthematous lesions or discoloration noted on facial skin         [] Abnormal-            Psychiatric:       [x] Normal Affect [] No Hallucinations        [] Abnormal-     Other pertinent observable physical exam findings-     ASSESSMENT/PLAN:    1. Ptosis of left eyelid  Was evaluated by ophthalmologist. Per patient ophthalmologist wanted her to hold off on the steroids and see rheumatology first. Patient has not started the steroids. Patient did not have the biopsy for further evaluation of GCA. Did update her endocrinologist of possible treatment for GCA with steroids.  was given the insulin adjustments in case the medication was started. (Patient has uncontrolled diabetes which complicates her care). 2. Temporal headache  Still has the headache. Family is to speak with ophthalmologist to see what next step is. Marybel has not seen the rheumatologist yet. Will leave steroids on file for patient. No follow-ups on file. Yosi Bacon, was evaluated through a synchronous (real-time) audio-video encounter. The patient (or guardian if applicable) is aware that this is a billable service, which includes applicable co-pays. This Virtual Visit was conducted with patient's (and/or legal guardian's) consent.  The visit was conducted pursuant to the emergency declaration under the [facility] waiver authority and the Coronavirus Preparedness and Response Supplemental Appropriations Act. Patient identification was verified, and a caregiver was present when appropriate. The patient was located at home in a state where the provider was licensed to provide care. Total time spent on this encounter:  15 minutes    --Julee Beasley MD on 12/5/2022 at 8:12 AM    An electronic signature was used to authenticate this note.

## 2022-11-28 NOTE — TELEPHONE ENCOUNTER
Requesting medication refill. Please approve or deny this request.    Rx requested:  Requested Prescriptions     Pending Prescriptions Disp Refills    carvedilol (COREG) 25 MG tablet [Pharmacy Med Name: Carvedilol 25 MG Oral Tablet] 270 tablet 0     Sig: TAKE 1 & 1/2 (ONE & ONE-HALF) TABLETS BY MOUTH TWICE DAILY WITH MEALS    digoxin (LANOXIN) 125 MCG tablet [Pharmacy Med Name: Digoxin 125 MCG Oral Tablet] 45 tablet 0     Sig: TAKE 1 TABLET BY MOUTH EVERY OTHER DAY    amLODIPine (NORVASC) 5 MG tablet [Pharmacy Med Name: amLODIPine Besylate 5 MG Oral Tablet] 90 tablet 0     Sig: Take 1 tablet by mouth once daily         Last Office Visit:   10/26/2022      Next Visit Date:  Future Appointments   Date Time Provider Zaid Lr   11/28/2022  5:30 PM Seymour Dubin, MD [facility]   12/14/2022  2:45 PM Yaritza Gilbert MD [address]   12/16/2022 11:00 AM JOHNATHON Telles - [facility]   2/8/2023  2:45 PM Waldo Martin, [address]   2/21/2023  1:15 PM Bill Montes MD [facility]   2/28/2023  2:30 PM Ruth Patino MD [facility]   4/12/2023  2:45 PM MD Harsha Rios Neurology -               Last refill 9/29/2022. Please approve or deny.

## 2022-12-09 ENCOUNTER — HOSPITAL ENCOUNTER (OUTPATIENT)
Dept: MRI IMAGING | Age: 77
End: 2022-12-09
Payer: MEDICARE

## 2022-12-09 DIAGNOSIS — M31.6 GIANT CELL ARTERITIS (HCC): ICD-10-CM

## 2022-12-09 PROCEDURE — 70540 MRI ORBIT/FACE/NECK W/O DYE: CPT

## 2022-12-16 ENCOUNTER — OFFICE VISIT (OUTPATIENT)
Dept: PALLATIVE CARE | Age: 77
End: 2022-12-16
Payer: MEDICARE

## 2022-12-16 VITALS
TEMPERATURE: 98.6 F | DIASTOLIC BLOOD PRESSURE: 57 MMHG | SYSTOLIC BLOOD PRESSURE: 112 MMHG | OXYGEN SATURATION: 98 % | HEART RATE: 78 BPM

## 2022-12-16 DIAGNOSIS — H02.402 PTOSIS OF EYELID, LEFT: ICD-10-CM

## 2022-12-16 DIAGNOSIS — M79.642 PAIN IN BOTH HANDS: ICD-10-CM

## 2022-12-16 DIAGNOSIS — G47.9 SLEEP DISTURBANCE: ICD-10-CM

## 2022-12-16 DIAGNOSIS — F48.2 PSEUDOBULBAR AFFECT: ICD-10-CM

## 2022-12-16 DIAGNOSIS — B36.9 FUNGAL DERMATITIS: ICD-10-CM

## 2022-12-16 DIAGNOSIS — R45.86 EMOTIONAL LABILITY: ICD-10-CM

## 2022-12-16 DIAGNOSIS — R51.9 INTRACTABLE HEADACHE, UNSPECIFIED CHRONICITY PATTERN, UNSPECIFIED HEADACHE TYPE: Primary | ICD-10-CM

## 2022-12-16 DIAGNOSIS — Z51.5 PALLIATIVE CARE ENCOUNTER: ICD-10-CM

## 2022-12-16 DIAGNOSIS — R53.82 CHRONIC FATIGUE: ICD-10-CM

## 2022-12-16 DIAGNOSIS — N18.9 CHRONIC KIDNEY DISEASE, UNSPECIFIED CKD STAGE: ICD-10-CM

## 2022-12-16 DIAGNOSIS — J44.9 CHRONIC OBSTRUCTIVE PULMONARY DISEASE, UNSPECIFIED COPD TYPE (HCC): ICD-10-CM

## 2022-12-16 DIAGNOSIS — M79.641 PAIN IN BOTH HANDS: ICD-10-CM

## 2022-12-16 DIAGNOSIS — H57.12 EYE PAIN, LEFT: ICD-10-CM

## 2022-12-16 DIAGNOSIS — Z95.828 PORT-A-CATH IN PLACE: ICD-10-CM

## 2022-12-16 DIAGNOSIS — E11.43 DIABETIC AUTONOMIC NEUROPATHY ASSOCIATED WITH TYPE 2 DIABETES MELLITUS (HCC): ICD-10-CM

## 2022-12-16 DIAGNOSIS — I50.42 CHRONIC COMBINED SYSTOLIC AND DIASTOLIC CHF (CONGESTIVE HEART FAILURE) (HCC): ICD-10-CM

## 2022-12-16 PROCEDURE — 1090F PRES/ABSN URINE INCON ASSESS: CPT | Performed by: NURSE PRACTITIONER

## 2022-12-16 PROCEDURE — 1123F ACP DISCUSS/DSCN MKR DOCD: CPT | Performed by: NURSE PRACTITIONER

## 2022-12-16 PROCEDURE — 3078F DIAST BP <80 MM HG: CPT | Performed by: NURSE PRACTITIONER

## 2022-12-16 PROCEDURE — 99349 HOME/RES VST EST MOD MDM 40: CPT | Performed by: NURSE PRACTITIONER

## 2022-12-16 PROCEDURE — 1036F TOBACCO NON-USER: CPT | Performed by: NURSE PRACTITIONER

## 2022-12-16 PROCEDURE — G8417 CALC BMI ABV UP PARAM F/U: HCPCS | Performed by: NURSE PRACTITIONER

## 2022-12-16 PROCEDURE — 3046F HEMOGLOBIN A1C LEVEL >9.0%: CPT | Performed by: NURSE PRACTITIONER

## 2022-12-16 PROCEDURE — G8484 FLU IMMUNIZE NO ADMIN: HCPCS | Performed by: NURSE PRACTITIONER

## 2022-12-16 PROCEDURE — 3074F SYST BP LT 130 MM HG: CPT | Performed by: NURSE PRACTITIONER

## 2022-12-16 RX ORDER — TRAMADOL HYDROCHLORIDE 50 MG/1
0.5 TABLET ORAL 2 TIMES DAILY PRN
COMMUNITY
Start: 2022-12-02

## 2022-12-16 RX ORDER — MOMETASONE FUROATE 1 MG/G
CREAM TOPICAL
COMMUNITY
Start: 2022-11-07

## 2022-12-16 RX ORDER — NEBULIZER ACCESSORIES
1 KIT MISCELLANEOUS PRN
Qty: 1 KIT | Refills: 0 | Status: SHIPPED | OUTPATIENT
Start: 2022-12-16

## 2022-12-16 ASSESSMENT — ENCOUNTER SYMPTOMS
NAUSEA: 0
VOMITING: 0
EYE DISCHARGE: 0
CHOKING: 0
SHORTNESS OF BREATH: 1
ABDOMINAL PAIN: 0
CONSTIPATION: 0
WHEEZING: 0
DIARRHEA: 0
BLOOD IN STOOL: 0
BACK PAIN: 0

## 2022-12-16 NOTE — PROGRESS NOTES
Subjective:      Patient Id: Seen Bárbara Almendarez at home in [facility] for follow-up palliative medicine visit. She was accompanied to the appointment by: spouse, Margy Rasheed. Chief Complaint   Patient presents with    Headache    Other     Eye drooping        HPI       Malu Cifuentes is a 65 y.o. female with a complex medical history that includes GI bleeding throughout the distal small intestine, anxiety, asthma, CHF, COPD, CKD depression, fibromyalgia, OA, cardiac arrest, seizures, DM II, CVA, sleep apnea, and pseudobulbar effect. General: Patient is alert and in NAD. 2 weeks ago, patient was having pain on the left side of her face and eyelid drooping. Saw ophthalmology, rheumatology, and her dentist. There was concern for giant cell arteritis. However, bx. Negative, so she didn't start on steroids. Had MRI of the orbit, face, neck. Impression as follows:    Impression   1. Unremarkable MRI of the orbits. 2. Mild subcutaneous edema in the left emporal region which may be related to   patient's history of giant cell arteritis. Clinical correlation is needed. Has some ongoing double vision. Describes pain as an ache. Tylenol helps with the pain. Patient was recently prescribed ultram for the pain which patient states has been ineffective. Pain comes and goes. Currently rating the pain as mild but there are times it wakes her up it is so intense. Touching her face/head does not exacerbate the area. No other related s/s. CVA: Had recent stroke in April. MRI from 4/7/22 showed new small right CVA. Not currently on anticoagulation due to increased risk for bleeding due to AVM malformation. COPD/asthma: No recent issues with breathing. Using nebulizers at least once daily. No wheezing. No cough. Has SOB with exertion at baseline. CHF: No leg swelling. No chest pain. Following with Dr. Alyssia Campos and cardiology. Hand pain/neuropathy: Pain has gotten better.  Was told she needed carpal tunnel surgery, but patient declined. Skin: Breast rash has cleared. Uses nystatin powder prn and reports keeping the area clean and dry. Mood/anxiety/depression: Patient with intermittent \"crying spells. \" Currently following with neurology Dr. Gloria Ramsay. Taking Nuedexta BID per . Diagnosed with pseudobulbar affect. Crying spells are mild now with increased dose of Nuedexta. Sleep: Patient states that her sleep schedule has remained stable. T2DM: Blood sugars have been up and down per spouse. Continues to follow with endocrine. Past Medical History:   Diagnosis Date    Adenomatous polyp of sigmoid colon     Adenomatous polyp of transverse colon     Anxiety     Asthma     dx 2019 / has smoked since age 11    Brainstem stroke ([address]     Cardiopulmonary arrest ([address]     CHF (congestive heart failure) ([facility])     Chronic back pain     Bilateral L5 S1 Radic on emg--surprisingly worse on the left than the right--pt's symptoms and her MRI show worse on the right    Chronic obstructive pulmonary disease with acute exacerbation ([address] 10/12/2019    Depression     ESBL E. coli carrier     Carrier. Fibromyalgia     Gastrointestinal hemorrhage 2/24/2020    Hyperlipidemia     meds > 8 yrs    Hypertension     meds > 45 yrs    Insomnia 12/4/2013    On home O2     2l per n/c at bedtime mostly,     Osteoarthritis     Seizures ([address]     Sepsis ([address] 10/6/2020    Smoker 6/18/2013    Type II diabetes mellitus, uncontrolled     hx > 8 yrs    Unspecified sleep apnea      Past Surgical History:   Procedure Laterality Date    BACK SURGERY  2017    lumbar disc    CARDIAC CATHETERIZATION  11/03/2014    DR. MIRELES / no stents    COLONOSCOPY  08/29/2016    w/polypectomy     COLONOSCOPY N/A 09/29/2020    COLONOSCOPY WITH POLYPECTOMY performed by Alexa Mendenhall MD at [address] N/A 10/07/2019    EUA HYSTEROSCOPY DILATATION AND CURETTAGE performed by Meri Cunningham DO at [address], COLON, DIAGNOSTIC      EYE SURGERY      [place] with IOL OU / [address] [address]    umbilical hernia repair    IR PORT PLACEMENT EQUAL OR GREATER THAN 5 YEARS  2021    IR PORT PLACEMENT EQUAL OR GREATER THAN 5 YEARS 2021 [facility] SPECIAL PROCEDURE    CA ESOPHAGOGASTRODUODENOSCOPY TRANSORAL DIAGNOSTIC N/A 2017    EGD ESOPHAGOGASTRODUODENOSCOPY performed by Valerie Narvaez MD at [address] [place] [address] 2018    negative findings    CA REVISE MEDIAN N/CARPAL TUNNEL SURG Left 2017    LEFT  CARPAL TUNNEL RELEASE performed by Betty Monroe MD at [address] [facility] RJ[facility],3+Y/D,DMVNW N/A 2018    TONSILLECTOMY      as child    TUNNELED VENOUS PORT PLACEMENT Right 2021    8 [address] [employer] Port ClearVUE by Dr. Asif Escobar  2016    w/bx     UPPER GASTROINTESTINAL ENDOSCOPY N/A 2020    EGD possible biopsy performed by Esther Olsen MD at [address] [facility] N/A 2020    EGD PUSH ENTEROSCOPY performed by Leny Chao MD at [address] [facility] History    Marital status:      Spouse name: Issa Perez    Number of children: 2    Years of education: 12    Highest education level: High school graduate   Occupational History    Occupation: Retired-   Tobacco Use    Smoking status: Former     Packs/day: 1.00     Years: 59.00     Pack years: 59.00     Types: Cigarettes     Start date: 2017     Quit date: 10/1/2020     Years since quittin.2    Smokeless tobacco: Never   Vaping Use    Vaping Use: Never used   Substance and Sexual Activity    Alcohol use: Not Currently    Drug use: No    Sexual activity: Yes     Partners: Male   Other Topics Concern    Not on file   Social History Narrative    Grew up in [place]     Lives With:  Librado Spouse    Type of Home: House    Home Layout: Two level, Performs ADL's on one level    Home Access: Stairs to enter with rails    Entrance Stairs - Number of Steps: 4    Bathroom Shower/Tub: Tub/Shower unit, Doors    Bathroom Equipment: Shower chair, Grab bars in shower    Home Equipment: Rolling walker, Cane, Oxygen(uses her O2 very seldom)    ADL Assistance: Needs assistance    Homemaking Assistance: Needs assistance    Homemaking Responsibilities: No(spouse performs)    Ambulation Assistance: Independent    Transfer Assistance: Independent    Active : No    Occupation: Retired    Type of occupation: worked in [employer]: patient enjoys [employer]     Social Determinants of Health     Financial Resource Strain: Low Risk     Difficulty of Paying Living Expenses: Not hard at [employer] Insecurity: No Food Insecurity    Worried About [address] in the Last Year: Never true    [address] [employer] in the Last Year: Never true   Transportation Needs: Not on file   Physical Activity: Not on file   Stress: Not on file   Social Connections: Not on file   Intimate Partner Violence: Not on file   Housing Stability: Not on file     Family History   Problem Relation Age of Onset    Heart Disease Father         cardiac bypass    Arthritis Father     Arthritis Mother     Other Mother          at age 80    Other Sister         [facility]    No Known Problems Daughter     Stroke Son      Allergies   Allergen Reactions    Ibuprofen Nausea Only    Metformin And Related      Diarrhea      Darvon [Propoxyphene Hcl] Nausea And Vomiting     Current Outpatient Medications on File Prior to Visit   Medication Sig Dispense Refill    traMADol (ULTRAM) 50 MG tablet Take 0.5 tablets by mouth 2 times daily as needed. mometasone (ELOCON) 0.1 % cream As needed.       carvedilol (COREG) 25 MG tablet TAKE 1 & 1/2 (ONE & ONE-HALF) TABLETS BY MOUTH TWICE DAILY WITH MEALS 270 tablet 0    digoxin (LANOXIN) 125 MCG tablet TAKE 1 TABLET BY MOUTH EVERY OTHER DAY 45 tablet 0 amLODIPine (NORVASC) 5 MG tablet Take 1 tablet by mouth once daily 90 tablet 0    rosuvastatin (CRESTOR) 40 MG tablet TAKE 1 TABLET BY MOUTH ONCE DAILY IN THE EVENING 90 tablet 0    predniSONE (DELTASONE) 20 MG tablet Take 2 tablets by mouth daily 60 tablet 0    blood glucose monitor kit and supplies Dispense one meter that insurance will cover. 1 kit 0    blood glucose monitor strips Test 3x daily E11.65 100 strip 3    Lancets MISC 1 each by Does not apply route daily 100 each 3    dextromethorphan-quiNIDine (NUEDEXTA) 20-10 MG CAPS per capsule Take 1 capsule by mouth in the morning and at bedtime 6 SAMPLES GIVEN:   LOT: [id number]  EXP: 01/23 60 capsule 78    insulin lispro (HUMALOG) 100 UNIT/ML SOLN injection vial INJECT SUBCUTANEOUSLY THREE TIMES DAILY- 24 UNITS BEFORE LUNCH, 24 UNITS MIDDAY SNACK, AND INCREASE TO 36 UNITS BEFORE DINNER 30 mL 3    Continuous Blood Gluc Sensor (FREESTYLE MURALI 2 SENSOR) MISC 1 Device by Does not apply route every 14 days 2 each 3    glucose 4 g chewable tablet Take 4 tablets by mouth as needed for Low blood sugar 60 tablet 3    blood glucose monitor kit and supplies Give 1 meter covered by insurance 1 kit 0    blood glucose monitor strips Test 2x daily 100 strip 3    Lancets MISC Pt test 2x daily 50 each 3    insulin glargine (LANTUS) 100 UNIT/ML injection vial INJECT 75 UNITS AT BEDTIME, 30 UNITS IN THE MORNING  .  E11.69 40 mL 3    Ostomy Supplies (SKIN TAC ADHESIVE BARRIER WIPE) MISC 1 packet by Does not apply route once a week 50 each 5    nystatin (MYCOSTATIN) 353543 UNIT/GM powder Apply topically 4 times daily.  1 each 3    Handicap Placard MISC by Does not apply route Good from 10/6/22 till 10/6/27 1 each 0    pantoprazole (PROTONIX) 40 MG tablet Take 1 tablet by mouth every morning (before breakfast) 90 tablet 3    ACCU-CHEK PHYLLIS PLUS strip Test 3x daily Dx E11.65 100 each 3    Continuous Blood Gluc  (FREESTYLE MURALI 2 READER) [place] 1 Device by Does not apply route 4 times daily (before meals and nightly) 1 each 0    fluocinonide (LIDEX) 0.05 % external solution Apply topically 2 times daily. 60 mL 0    dextromethorphan-quiNIDine (NUEDEXTA) 20-10 MG CAPS per capsule Take 1 capsule by mouth daily (Patient taking differently: Take 1 capsule by mouth 2 times daily) 30 capsule 3    nitrofurantoin (MACRODANTIN) 50 MG capsule One po daily 90 capsule 3    ferrous sulfate (IRON 325) 325 (65 Fe) MG tablet Take 1 tablet by mouth every other day 30 tablet 3    Glucagon (GVOKE HYPOPEN 2-PACK) 1 MG/0.2ML SOAJ Inject 1 mg into the skin every 15 minutes as needed (glucose less tahtn 70 or if symptomatic) 2 each 3    albuterol-ipratropium (COMBIVENT RESPIMAT)  MCG/ACT AERS inhaler Inhale 1 puff into the lungs every 6 hours as needed for Wheezing or Shortness of Breath 4 g 5    ipratropium-albuterol (DUONEB) 0.5-2.5 (3) MG/3ML SOLN nebulizer solution Inhale 3 mLs into the lungs 3 times daily 360 mL 0    Cranberry 500 MG CAPS Take 500 mg by mouth daily      Cholecalciferol (VITAMIN D3) 50 MCG (2000 UT) CAPS Take 1,000 Units by mouth 2 times daily       lidocaine-prilocaine (EMLA) 2.5-2.5 % cream APPLY CREAM TOPICALLY TO PORT SITE ONE HOUR PRIOR TO APPOINTMENT AS NEEDED      Accu-Chek Softclix Lancets MISC bid 100 each 3    Blood Glucose Monitoring Suppl (ACCU-CHEK PHYLLIS CONNECT) w/Device KIT 1 kit by Does not apply route daily 1 kit 0     No current facility-administered medications on file prior to visit. Review of Systems   Constitutional:  Positive for fatigue. Negative for activity change, appetite change and unexpected weight change. HENT:  Negative for congestion and trouble swallowing. Eyes:  Positive for pain and visual disturbance (decreased vision- ongoing). Negative for discharge. Double vision and eye pain in left eye. Respiratory:  Positive for shortness of breath (with exertion, chronic). Negative for cough, choking and wheezing.     Cardiovascular: Negative for chest pain, palpitations and leg swelling. Gastrointestinal:  Negative for abdominal pain, blood in stool, constipation, diarrhea, nausea and vomiting. Genitourinary: Negative. Musculoskeletal:  Positive for arthralgias. Negative for back pain, gait problem and myalgias. Skin:  Negative for color change, rash and wound. Neurological:  Positive for weakness and numbness (BUE/hands). Negative for dizziness, tremors, speech difficulty and light-headedness. Psychiatric/Behavioral:  Negative for confusion, dysphoric mood (intermittent crying spells- improving) and sleep disturbance. The patient is not nervous/anxious. Objective:   BP (!) 112/57   Pulse 78   Temp 98.6 °F (37 °C)   LMP  (LMP Unknown)   SpO2 98%    Wt Readings from Last 3 Encounters:   11/02/22 152 lb (68.9 kg)   10/28/22 151 lb (68.5 kg)   10/12/22 151 lb (68.5 kg)     Physical Exam  Constitutional:       General: She is not in acute distress. Appearance: She is obese. HENT:      Head: Normocephalic and atraumatic. Nose: No rhinorrhea. Eyes:      General: No scleral icterus. Right eye: No discharge. Left eye: No discharge. Extraocular Movements: Extraocular movements intact. Conjunctiva/sclera: Conjunctivae normal.   Cardiovascular:      Rate and Rhythm: Normal rate and regular rhythm. Pulses: Normal pulses. Heart sounds: Murmur heard. Pulmonary:      Effort: Pulmonary effort is normal.      Breath sounds: Normal breath sounds. No wheezing or rhonchi. Abdominal:      General: Bowel sounds are normal. There is no distension. Palpations: Abdomen is soft. Tenderness: There is no abdominal tenderness. Musculoskeletal:      Cervical back: Normal range of motion and neck supple. Right lower leg: No edema. Left lower leg: No edema. Skin:     General: Skin is warm and dry. Findings: No bruising or rash.    Neurological:      Mental Status: She is alert and oriented to person, place, and time. Psychiatric:         Attention and Perception: Attention normal.         Mood and Affect: Mood normal.         Behavior: Behavior normal.     Assessment and Plan:     1. Pain in both hands  2. Diabetic autonomic neuropathy associated with type 2 diabetes mellitus ([facility]  Improved. CTM. 3. Chronic kidney disease, unspecified CKD stage  Avoid nephrotoxic agents. 4. Chronic combined systolic and diastolic CHF (congestive heart failure) ([facility])  Stable. Monitor weight daily, call if you gain 3 lbs in one day or 5 lbs. in one week or for increased edema, sob, cough, orthopnea, or chest pain. Patient following with cardiology. 5. Chronic fatigue  6. Sleep disturbance  Remains stable. Sleeping through the night and less sleeping during the day. 7. Chronic obstructive pulmonary disease, unspecified COPD type ([facility]  Stable. Continue COPD directed therapies. Call for increased SOB, cough, sputum production, excessive fatigue, fever, chills, or myalgias. Activity as tolerated. Ordered nebulizer supplies. 8. Pseudobulbar affect  9. Emotional lability  Improved with Nuedexta increase. Managed by neurology. Symptoms now mild. 10. Fungal dermatitis  Improved. Use nystatin powder prn. 11. Eye pain, left   12. Ptosis of left eyelid  13. Intractable headache  Discussed concerns, one being GCA, however this ruled out with negative bx. Discussed potential for stroke or trigeminal neuralgia. Recommended neurology f/u. Discussed red flags and s/s of stroke and when to seek emergency treatment. Current symptoms have been ongoing for two weeks. Saw rheumatology, ophthalmology, and the dentist in regards to her symptoms. 15. Port-A-Cath in place  Patient would like this removed ,as it does not get used. Will consult general surgery. 15. Palliative care encounter  Discussed symptom management related to chronic disease/condition.  Provided emotional support and active listening. Patient understands and is agreeable to current plan. Due to acuity, symptomatology and high-risk medication management, I advised patient to Return in about 2 months (around 2/16/2023). Due to scheduling availability within the department      Total Time 45 minutes     Total time includes reviewing labs and tests, reviewing history, medications, and allergies, counseling patient, performing medically appropriate evaluation and examination, placing orders, and documenting in the medical record.      JOHNATHON Fuentes - CNP    Collaborating physician: Dr. Petar Campa

## 2022-12-18 PROBLEM — R51.9 INTRACTABLE HEADACHE: Status: ACTIVE | Noted: 2022-12-18

## 2022-12-18 PROBLEM — H02.402 PTOSIS OF EYELID, LEFT: Status: ACTIVE | Noted: 2022-12-18

## 2022-12-18 ASSESSMENT — ENCOUNTER SYMPTOMS
COUGH: 0
EYE PAIN: 1
TROUBLE SWALLOWING: 0
COLOR CHANGE: 0

## 2022-12-20 ENCOUNTER — TELEPHONE (OUTPATIENT)
Dept: NEUROLOGY | Age: 77
End: 2022-12-20

## 2022-12-20 NOTE — TELEPHONE ENCOUNTER
Couple weeks ago patient started having left eye pain and tempal pain, went to eye doctor and everything checked out, went to dentist and that checked out fine also.  She went to rheumatology and they ordered biopsy of her artery temporal and MRI( both in care every where)  states that she is having double vision in her left eye and it is droopy he is wanting sooner appt then April please advise     Cassidy Garza [phone]

## 2022-12-29 ENCOUNTER — OFFICE VISIT (OUTPATIENT)
Dept: NEUROLOGY | Age: 77
End: 2022-12-29
Payer: MEDICARE

## 2022-12-29 ENCOUNTER — OFFICE VISIT (OUTPATIENT)
Dept: SURGERY | Age: 77
End: 2022-12-29
Payer: MEDICARE

## 2022-12-29 VITALS
DIASTOLIC BLOOD PRESSURE: 60 MMHG | BODY MASS INDEX: 30.3 KG/M2 | WEIGHT: 150 LBS | SYSTOLIC BLOOD PRESSURE: 122 MMHG | HEART RATE: 72 BPM

## 2022-12-29 VITALS
BODY MASS INDEX: 31.25 KG/M2 | WEIGHT: 155 LBS | HEIGHT: 59 IN | SYSTOLIC BLOOD PRESSURE: 136 MMHG | DIASTOLIC BLOOD PRESSURE: 64 MMHG | TEMPERATURE: 98.7 F

## 2022-12-29 DIAGNOSIS — F48.2 PSEUDOBULBAR AFFECT: ICD-10-CM

## 2022-12-29 DIAGNOSIS — R51.9 INTRACTABLE HEADACHE, UNSPECIFIED CHRONICITY PATTERN, UNSPECIFIED HEADACHE TYPE: ICD-10-CM

## 2022-12-29 DIAGNOSIS — H02.402 PTOSIS OF LEFT EYELID: ICD-10-CM

## 2022-12-29 DIAGNOSIS — R27.0 ATAXIA: ICD-10-CM

## 2022-12-29 DIAGNOSIS — Z45.2 ENCOUNTER FOR CARE RELATED TO VASCULAR ACCESS PORT: Primary | ICD-10-CM

## 2022-12-29 DIAGNOSIS — I63.9 BRAINSTEM STROKE (HCC): ICD-10-CM

## 2022-12-29 DIAGNOSIS — H49.02 LEFT OCULOMOTOR NERVE PALSY: Primary | ICD-10-CM

## 2022-12-29 LAB
C-REACTIVE PROTEIN: <3 MG/L (ref 0–5)
SEDIMENTATION RATE, ERYTHROCYTE: 58 MM (ref 0–30)

## 2022-12-29 PROCEDURE — 1123F ACP DISCUSS/DSCN MKR DOCD: CPT | Performed by: SURGERY

## 2022-12-29 PROCEDURE — 3078F DIAST BP <80 MM HG: CPT | Performed by: SURGERY

## 2022-12-29 PROCEDURE — 1123F ACP DISCUSS/DSCN MKR DOCD: CPT | Performed by: PSYCHIATRY & NEUROLOGY

## 2022-12-29 PROCEDURE — 3074F SYST BP LT 130 MM HG: CPT | Performed by: PSYCHIATRY & NEUROLOGY

## 2022-12-29 PROCEDURE — 3078F DIAST BP <80 MM HG: CPT | Performed by: PSYCHIATRY & NEUROLOGY

## 2022-12-29 PROCEDURE — 99215 OFFICE O/P EST HI 40 MIN: CPT | Performed by: PSYCHIATRY & NEUROLOGY

## 2022-12-29 PROCEDURE — 3074F SYST BP LT 130 MM HG: CPT | Performed by: SURGERY

## 2022-12-29 PROCEDURE — 99203 OFFICE O/P NEW LOW 30 MIN: CPT | Performed by: SURGERY

## 2022-12-29 RX ORDER — HYDROCODONE BITARTRATE AND ACETAMINOPHEN 5; 325 MG/1; MG/1
1 TABLET ORAL 3 TIMES DAILY PRN
Qty: 30 TABLET | Refills: 0 | Status: SHIPPED | OUTPATIENT
Start: 2022-12-29 | End: 2023-01-08

## 2022-12-29 ASSESSMENT — ENCOUNTER SYMPTOMS
NAUSEA: 0
CHOKING: 0
BACK PAIN: 0
SHORTNESS OF BREATH: 0
COLOR CHANGE: 0
VOMITING: 0
TROUBLE SWALLOWING: 0
PHOTOPHOBIA: 0

## 2022-12-29 NOTE — PROGRESS NOTES
GENERAL SURGERY  NEW PATIENT HISTORY AND PHYSICAL NOTE    Pt Name: Jamie Salamanca  MRN: [id number]    Date: 12/29/2022    Primary Care Physician: Ranjeet Lomas MD    Reason for evaluation: Port evaluation      SUBJECTIVE:     History of Chief Complaint:    Xu Alan is a 65 y.o. female with a PMH of CVA, seizures, HTN, HLD, CHF, asthma, COPD, sleep apnea, GIB, chronic back pain, DM, iron deficiency anemia (was getting treatment for it at [facility] hematology, hasn't needed it for the past 1 year now), FM, anxiety/ depression who presents for port evaluation. The port was placed by Dr Dylon Aceves in 5/2021 for IV access given anemia and need for frequent blood draws. Since that time, patient reports that she has been getting the port flushed every 4 weeks at the infusion center. She gets blood draws at the outpatient lab but no one there is qualified to access the port to use it so they have to poke her regardless to get blood work. When someone did try to get blood out of the port, they were unsuccessful. The port is not causing her any pain, she is just frustrated in having it when it doesn't draw blood back and others are not able to access it. Past Medical History:   Diagnosis Date    Adenomatous polyp of sigmoid colon     Adenomatous polyp of transverse colon     Anxiety     Asthma     dx 2019 / has smoked since age 11    Brainstem stroke ([facility] [address]     Cardiopulmonary arrest ([address]     CHF (congestive heart failure) (HCC)     Chronic back pain     Bilateral L5 S1 Radic on emg--surprisingly worse on the left than the right--pt's symptoms and her MRI show worse on the right    Chronic obstructive pulmonary disease with acute exacerbation ([address] 10/12/2019    Depression     ESBL E. coli carrier     Carrier.     Fibromyalgia     Gastrointestinal hemorrhage 2/24/2020    Hyperlipidemia     meds > 8 yrs    Hypertension     meds > 45 yrs    Insomnia 12/4/2013    On home O2     2l per n/c at bedtime mostly,     Osteoarthritis Seizures ([facility] [address]     Sepsis ([facility] [address] 10/6/2020    Smoker 6/18/2013    Type II diabetes mellitus, uncontrolled     hx > 8 yrs    Unspecified sleep apnea      Past Surgical History:   Procedure Laterality Date    BACK SURGERY  2017    lumbar disc    CARDIAC CATHETERIZATION  11/03/2014    DR. MIRELES / no stents    COLONOSCOPY  08/29/2016    w/polypectomy     COLONOSCOPY N/A 09/29/2020    COLONOSCOPY WITH POLYPECTOMY performed by Garth Coburn MD at [address] N/A 10/07/2019    EUA HYSTEROSCOPY DILATATION AND CURETTAGE performed by Slade Oneil DO at [address] [facility], COLON, DIAGNOSTIC      EYE SURGERY      Phaco with IOL OU / [address]  [address]    umbilical hernia repair    IR PORT PLACEMENT EQUAL OR GREATER THAN 5 YEARS  5/14/2021    IR PORT PLACEMENT EQUAL OR GREATER THAN 5 YEARS 5/14/2021 [facility] SPECIAL PROCEDURE    SC ESOPHAGOGASTRODUODENOSCOPY TRANSORAL DIAGNOSTIC N/A 03/24/2017    EGD ESOPHAGOGASTRODUODENOSCOPY performed by Danette Krause MD at [place] [address] 02/08/2018    negative findings    SC REVISE MEDIAN N/CARPAL TUNNEL SURG Left 06/05/2017    LEFT  CARPAL TUNNEL RELEASE performed by Rakesh Santiago MD at [address] [facility] GFFI,0+H/V,RQOSQ N/A 02/08/2018    TONSILLECTOMY      as child    TUNNELED VENOUS PORT PLACEMENT Right 05/14/2021    [address] [employer] Port ClearVUE by Dr. Gregg Guaman  08/26/2016    w/bx     UPPER GASTROINTESTINAL ENDOSCOPY N/A 02/25/2020    EGD possible biopsy performed by Garth Coburn MD at [address] [place] 07/01/2020    EGD PUSH ENTEROSCOPY performed by Philippe Peña MD at [facility]     Prior to Admission medications    Medication Sig Start Date End Date Taking? Authorizing Provider   traMADol (ULTRAM) 50 MG tablet Take 0.5 tablets by mouth 2 times daily as needed.  12/2/22 Historical Provider, MD   mometasone (ELOCON) 0.1 % cream As needed. 11/7/22   Historical Provider, MD   Respiratory Therapy Supplies (NEBULIZER/TUBING/MOUTHPIECE) KIT 1 kit by Does not apply route as needed (SOB/wheezing) 12/16/22   JOHNATHON Davis CNP   carvedilol (COREG) 25 MG tablet TAKE 1 & 1/2 (ONE & ONE-HALF) TABLETS BY MOUTH TWICE DAILY WITH MEALS 11/28/22   Nava Martinez MD   digoxin (LANOXIN) 125 MCG tablet TAKE 1 TABLET BY MOUTH EVERY OTHER DAY 11/28/22   Nava Martinez MD   amLODIPine (NORVASC) 5 MG tablet Take 1 tablet by mouth once daily 11/28/22   Nava Martinez MD   rosuvastatin (CRESTOR) 40 MG tablet TAKE 1 TABLET BY MOUTH ONCE DAILY IN THE EVENING 11/28/22   Fawad Pride MD   blood glucose monitor kit and supplies Dispense one meter that insurance will cover.  11/15/22   DIANE Fernandes   blood glucose monitor strips Test 3x daily E11.65 11/15/22   DIANE Fernandes   Lancets MISC 1 each by Does not apply route daily 11/15/22   DIANE Fernandes   dextromethorphan-quiNIDine (NUEDEXTA) 20-10 MG CAPS per capsule Take 1 capsule by mouth in the morning and at bedtime 6 SAMPLES GIVEN:   LOT: [id number]  EXP: 01/23 11/3/22   JOHNATHON Lund CNP   insulin lispro (HUMALOG) 100 UNIT/ML SOLN injection vial INJECT SUBCUTANEOUSLY THREE TIMES DAILY- 24 UNITS BEFORE LUNCH, 24 UNITS MIDDAY SNACK, AND INCREASE TO 36 UNITS BEFORE DINNER 11/2/22   DIANE Fernandes   Continuous Blood Gluc Sensor (FREESTYLE MURALI 2 SENSOR) MISC 1 Device by Does not apply route every 14 days 11/2/22   DIANE Fernandes   glucose 4 g chewable tablet Take 4 tablets by mouth as needed for Low blood sugar 11/2/22   DIANE Fernandes   blood glucose monitor kit and supplies Give 1 meter covered by insurance 11/2/22   DIANE Fernandes   blood glucose monitor strips Test 2x daily 11/2/22   DIANE Fernandes   Lancets MISC Pt test 2x daily 11/2/22   DIANE Fernandes   insulin glargine (LANTUS) 100 UNIT/ML injection vial INJECT 75 UNITS AT BEDTIME, 30 UNITS IN THE MORNING  .  E11.69 11/2/22   DIANE Palmer   Ostomy Supplies (SKIN TAC ADHESIVE BARRIER WIPE) MISC 1 packet by Does not apply route once a week 11/2/22   DIANE Palmer   nystatin (MYCOSTATIN) 936021 UNIT/GM powder Apply topically 4 times daily. 10/12/22   JOHNATHON Matias CNP   Handicap Placard MISC by Does not apply route Good from 10/6/22 till 10/6/27 10/6/22   Ana Rahman MD   pantoprazole (PROTONIX) 40 MG tablet Take 1 tablet by mouth every morning (before breakfast) 8/12/22   JOHNATHON Rios CNP   ACCU-CHEK PHYLLIS PLUS strip Test 3x daily Dx E11.65 8/9/22   DIANE Palmer   Continuous Blood Gluc  (FREESTYLE MURALI 2 READER) [place] 1 Device by Does not apply route 4 times daily (before meals and nightly) 6/6/22   DIANE Palmer   fluocinonide (LIDEX) 0.05 % external solution Apply topically 2 times daily.  5/27/22   JOHNATHON Baker - CNP   dextromethorphan-quiNIDine (NUEDEXTA) 20-10 MG CAPS per capsule Take 1 capsule by mouth daily  Patient taking differently: Take 1 capsule by mouth 2 times daily 5/23/22   Dania Loco MD   nitrofurantoin (MACRODANTIN) 50 MG capsule One po daily 5/20/22   Kelsea Gupta MD   ferrous sulfate (IRON 325) 325 (65 Fe) MG tablet Take 1 tablet by mouth every other day 5/13/22   Rosebud Schlatter, MD   Glucagon (Katya Slipper 2-PACK) 1 MG/0.2ML SOAJ Inject 1 mg into the skin every 15 minutes as needed (glucose less tahtn 70 or if symptomatic) 12/9/21   DIANE Palmer   albuterol-ipratropium (COMBIVENT RESPIMAT)  MCG/ACT AERS inhaler Inhale 1 puff into the lungs every 6 hours as needed for Wheezing or Shortness of Breath 10/13/21   Ana Rahman MD   ipratropium-albuterol (DUONEB) 0.5-2.5 (3) MG/3ML SOLN nebulizer solution Inhale 3 mLs into the lungs 3 times daily 10/13/21   Ana Rahman MD   Cranberry 500 MG CAPS Take 500 mg by mouth daily Historical Provider, MD   Cholecalciferol (VITAMIN D3) 50 MCG ( UT) CAPS Take 1,000 Units by mouth 2 times daily     Historical Provider, MD   lidocaine-prilocaine (EMLA) 2.5-2.5 % cream APPLY CREAM TOPICALLY TO PORT SITE ONE HOUR PRIOR TO APPOINTMENT AS NEEDED 21   Historical Provider, MD   Accu-Chek Softclix Lancets MISC bid 21   Elvira Lagos MD   Blood Glucose Monitoring Suppl ([address]) w/Device KIT 1 kit by Does not apply route daily 21   Elvira Lagos MD     Allergies   Allergen Reactions    Ibuprofen Nausea Only    Metformin And Related      Diarrhea      Darvon [Propoxyphene Hcl] Nausea And Vomiting     Family History   Problem Relation Age of Onset    Heart Disease Father         cardiac bypass    Arthritis Father     Arthritis Mother     Other Mother          at age 80    Other Sister         [facility]     No Known Problems Daughter     Stroke Son      Social History     Tobacco Use    Smoking status: Former     Packs/day: 1.00     Years: 59.00     Pack years: 59.00     Types: Cigarettes     Start date: 2017     Quit date: 10/1/2020     Years since quittin.2    Smokeless tobacco: Never   Vaping Use    Vaping Use: Never used   Substance Use Topics    Alcohol use: Not Currently    Drug use: No     Review of Systems:   General: Denies any fevers, chills, night sweats, appetite changes, and unintentional weight loss +fatigue  Skin: Denies any abscess, and bulges +itching, rash  HENT: Denies any sinus pressure, post nasal drip, rhinorrhea, sore throat, dysphagia +headaches, blurry vision  Resp: Denies any shortness of breath, dyspnea on exertion, coughing, wheezing  Cardiac: Denies any chest pain, palpitations, lower extremity edema  GI: Denies any nausea, vomiting, acid reflux, abdominal pain, diarrhea, constipation, melena/ hematochezia +heart burn  : Denies any dysuria, hesitancy, incomplete emptying, blood in the urine +incontinence  Heme: Denies easy bleeding/ bruising, swollen lymph nodes, anemia  Endocrine: Denies heat intolerance, cold intolerance, excessive thirst  MSK: Denies back pain +joint pain, trouble walking  Neuro: Denies tremors, seizures, light headedness, syncope, numbness +dizziness, weakness     OBJECTIVE:   CURRENT VITALS: /64   Temp 98.7 °F (37.1 °C) (Temporal)   Ht 4' 11\" (1.499 m)   Wt 155 lb (70.3 kg)   LMP  (LMP Unknown)   BMI 31.31 kg/m²      GEN: Alert and oriented x3, no acute distress, cooperative, sitting in a wheelchair  SKIN: Skin color, texture, turgor normal. No rashes or lesions  HEENT: Head is normocephalic, atraumatic. EOMI  NECK: Supple, symmetrical, trachea midline, skin normal  PULM: Chest symmetric, no increased work of breathing or accessory muscle use  CV: Heart regular rate   CHEST: right chest wall port in place without any overlying skin changes, nontender   ABD: Soft, nontender  EXTREMITIES: Warm, dry    ASSESSMENT AND PLAN:   Doc Maguire is a 65 y.o. female with a PMH of CVA, seizures, HTN, HLD, CHF, asthma, COPD, sleep apnea, GIB, chronic back pain, DM, iron deficiency anemia (was getting treatment for it at [facility] hematology, hasn't needed it for the past 1 year now s/p right chest wall port placement by Dr Danni Campbell (IR) in 5/2021 for poor IV access and need for frequent blood draws), FM, anxiety/ depression who presents with a dysfunctional right chest wall port (does not draw blood back per pt) that cannot seem to find people who know how to access it for blood draws. Will discuss with IR possibility of doing a study and salvage the port to get it to draw back blood  Spoke with infusion center here at [facility]- they are able to access ports for draw draws.  They just have to have the lab orders faxed to [phone] to do so  Will contact patient with updates after speaking with Dr Amarilys Loera MD   General surgeon    Electronically signed by Dawood Busby MD

## 2022-12-29 NOTE — PROGRESS NOTES
Subjective:      Patient ID: Hola Prather is a 65 y.o. female who presents today for:  Chief Complaint   Patient presents with    Follow-up     Pts  states that they seen [facility] for rheumatology they did biopsy, brain scan, labs. There was an MRI that was done here as well.  states that they are thinking that it might be from a stroke that she had in the past.  Pts  states that it has been going on for about 3 weeks. He states the left eye started to droop and have pain. He states that it has not gotten worse. He states that there is also pain in the left side of the head. Rheumatology wants to put her on steroids. Other     Pts  states that she is on the neudexta 1 time a day and she is still having outburst is there anything more that can be done to help her. HPI 68-year-old right-handed female with a known history of Intractable peripheral neuropathy with lumbar colopathy and she also sustained a previous brainstem stroke patient was seen here for ataxia as well as pseudobulbar affect. She was recommended to have new Alveta Kill  though this was not called by the insurance company    Patient is here urgently to be seen as patient has some new problems which are neurologic. She started to have eye pain 3 weeks ago which was her left eye. She was then seen at [facility] ophthalmology and rheumatology and there was some concerns about temporal arteritis. Patient had a sed rate which was initially 53 and came down on its own to 22. Her CRP was never done. On close questioning with the family actually she presented with ptosis which is also improving. She had double vision with the same. She had pain behind the eye. She is diabetic. Patient then had a temporal artery biopsy which was negative and then had an MRI of the brain which did not show anything significant.     The question is raised regarding steroid use and she is significantly diabetic with blood sugars around 300. The question is if is going to help or not. Pain is about the eye and more so on the top of her head. When examination is performed actually the pain is coming from the trapezius muscle and the neck. Past Medical History:   Diagnosis Date    Adenomatous polyp of sigmoid colon     Adenomatous polyp of transverse colon     Anxiety     Asthma     dx 2019 / has smoked since age 11    Brainstem stroke ([address]     Cardiopulmonary arrest ([address]     CHF (congestive heart failure) (HCC)     Chronic back pain     Bilateral L5 S1 Radic on emg--surprisingly worse on the left than the right--pt's symptoms and her MRI show worse on the right    Chronic obstructive pulmonary disease with acute exacerbation ([address] 10/12/2019    Depression     ESBL E. coli carrier     Carrier. Fibromyalgia     Gastrointestinal hemorrhage 2/24/2020    Hyperlipidemia     meds > 8 yrs    Hypertension     meds > 45 yrs    Insomnia 12/4/2013    On home O2     2l per n/c at bedtime mostly,     Osteoarthritis     Seizures ([address]     Sepsis ([facility] 10/6/2020    Smoker 6/18/2013    Type II diabetes mellitus, uncontrolled     hx > 8 yrs    Unspecified sleep apnea      Past Surgical History:   Procedure Laterality Date    BACK SURGERY  2017    lumbar disc    CARDIAC CATHETERIZATION  11/03/2014    DR. MIRELES / no stents    COLONOSCOPY  08/29/2016    w/polypectomy     COLONOSCOPY N/A 09/29/2020    COLONOSCOPY WITH POLYPECTOMY performed by Dionne Bernheim, MD at [address] N/A 10/07/2019    EUA HYSTEROSCOPY DILATATION AND CURETTAGE performed by Lisandra Barton DO at [address] with IOL OU / [address]  [address]    umbilical hernia repair    IR PORT PLACEMENT EQUAL OR GREATER THAN 5 YEARS  5/14/2021    IR PORT PLACEMENT EQUAL OR GREATER THAN 5 YEARS 5/14/2021 [facility] SPECIAL PROCEDURE    AL ESOPHAGOGASTRODUODENOSCOPY assistance    Homemaking Assistance: Needs assistance    Homemaking Responsibilities: No(spouse performs)    Ambulation Assistance: Independent    Transfer Assistance: Independent    Active : No    Occupation: Retired    Type of occupation: worked in [employer]: patient enjoys [employer]     Social Determinants of Health     Financial Resource Strain: Low Risk     Difficulty of Paying Living Expenses: Not hard at [employer] Insecurity: No Food Insecurity    Worried About Running Out of Food in the Last Year: Never true    Ran Out of Food in the Last Year: Never true   Transportation Needs: Not on file   Physical Activity: Not on file   Stress: Not on file   Social Connections: Not on file   Intimate Partner Violence: Not on file   Housing Stability: Not on file     Family History   Problem Relation Age of Onset    Heart Disease Father         cardiac bypass    Arthritis Father     Arthritis Mother     Other Mother          at age 80    Other Sister         [facility]    No Known Problems Daughter     Stroke Son      Allergies   Allergen Reactions    Ibuprofen Nausea Only    Metformin And Related      Diarrhea      Darvon [Propoxyphene Hcl] Nausea And Vomiting       Current Outpatient Medications   Medication Sig Dispense Refill    HYDROcodone-acetaminophen (LORCET) 5-325 MG per tablet Take 1 tablet by mouth 3 times daily as needed for Pain for up to 10 days. 1 to be taken 3 times a day as needed severe pain in the eye. Max Daily Amount: 30 tablets 30 tablet 0    traMADol (ULTRAM) 50 MG tablet Take 0.5 tablets by mouth 2 times daily as needed. (Patient not taking: Reported on 2022)      mometasone (ELOCON) 0.1 % cream As needed.       Respiratory Therapy Supplies (NEBULIZER/TUBING/MOUTHPIECE) KIT 1 kit by Does not apply route as needed (SOB/wheezing) 1 kit 0    carvedilol (COREG) 25 MG tablet TAKE 1 & 1/2 (ONE & ONE-HALF) TABLETS BY MOUTH TWICE DAILY WITH MEALS 270 tablet 0    digoxin (LANOXIN) 125 MCG tablet TAKE 1 TABLET BY MOUTH EVERY OTHER DAY 45 tablet 0    amLODIPine (NORVASC) 5 MG tablet Take 1 tablet by mouth once daily 90 tablet 0    rosuvastatin (CRESTOR) 40 MG tablet TAKE 1 TABLET BY MOUTH ONCE DAILY IN THE EVENING 90 tablet 0    blood glucose monitor kit and supplies Dispense one meter that insurance will cover. 1 kit 0    blood glucose monitor strips Test 3x daily E11.65 100 strip 3    Lancets MISC 1 each by Does not apply route daily 100 each 3    dextromethorphan-quiNIDine (NUEDEXTA) 20-10 MG CAPS per capsule Take 1 capsule by mouth in the morning and at bedtime 6 SAMPLES GIVEN:   LOT: [id number]  EXP: 01/23 60 capsule 78    insulin lispro (HUMALOG) 100 UNIT/ML SOLN injection vial INJECT SUBCUTANEOUSLY THREE TIMES DAILY- 24 UNITS BEFORE LUNCH, 24 UNITS MIDDAY SNACK, AND INCREASE TO 36 UNITS BEFORE DINNER 30 mL 3    Continuous Blood Gluc Sensor (FREESTYLE MURALI 2 SENSOR) MISC 1 Device by Does not apply route every 14 days 2 each 3    glucose 4 g chewable tablet Take 4 tablets by mouth as needed for Low blood sugar 60 tablet 3    blood glucose monitor kit and supplies Give 1 meter covered by insurance 1 kit 0    blood glucose monitor strips Test 2x daily 100 strip 3    Lancets MISC Pt test 2x daily 50 each 3    insulin glargine (LANTUS) 100 UNIT/ML injection vial INJECT 75 UNITS AT BEDTIME, 30 UNITS IN THE MORNING  .  E11.69 40 mL 3    Ostomy Supplies (SKIN TAC ADHESIVE BARRIER WIPE) MISC 1 packet by Does not apply route once a week 50 each 5    nystatin (MYCOSTATIN) 556544 UNIT/GM powder Apply topically 4 times daily.  1 each 3    Handicap Placard MISC by Does not apply route Good from 10/6/22 till 10/6/27 1 each 0    pantoprazole (PROTONIX) 40 MG tablet Take 1 tablet by mouth every morning (before breakfast) 90 tablet 3    ACCU-CHEK PHYLLIS PLUS strip Test 3x daily Dx E11.65 100 each 3    Continuous Blood Gluc  (FREESTYLE MURALI 2 READER) [place] 1 Device by Does not apply route 4 times daily (before meals and nightly) 1 each 0    fluocinonide (LIDEX) 0.05 % external solution Apply topically 2 times daily. 60 mL 0    dextromethorphan-quiNIDine (NUEDEXTA) 20-10 MG CAPS per capsule Take 1 capsule by mouth daily 30 capsule 3    nitrofurantoin (MACRODANTIN) 50 MG capsule One po daily (Patient not taking: Reported on 12/29/2022) 90 capsule 3    ferrous sulfate (IRON 325) 325 (65 Fe) MG tablet Take 1 tablet by mouth every other day 30 tablet 3    Glucagon (GVOKE HYPOPEN 2-PACK) 1 MG/0.2ML SOAJ Inject 1 mg into the skin every 15 minutes as needed (glucose less tahtn 70 or if symptomatic) 2 each 3    albuterol-ipratropium (COMBIVENT RESPIMAT)  MCG/ACT AERS inhaler Inhale 1 puff into the lungs every 6 hours as needed for Wheezing or Shortness of Breath 4 g 5    ipratropium-albuterol (DUONEB) 0.5-2.5 (3) MG/3ML SOLN nebulizer solution Inhale 3 mLs into the lungs 3 times daily 360 mL 0    Cranberry 500 MG CAPS Take 500 mg by mouth daily      Cholecalciferol (VITAMIN D3) 50 MCG (2000 UT) CAPS Take 1,000 Units by mouth 2 times daily       lidocaine-prilocaine (EMLA) 2.5-2.5 % cream APPLY CREAM TOPICALLY TO PORT SITE ONE HOUR PRIOR TO APPOINTMENT AS NEEDED      Accu-Chek Softclix Lancets MISC bid 100 each 3    Blood Glucose Monitoring Suppl (ACCU-CHEK PHYLLIS CONNECT) w/Device KIT 1 kit by Does not apply route daily 1 kit 0     No current facility-administered medications for this visit. Review of Systems   Constitutional:  Negative for fever. HENT:  Negative for ear pain, tinnitus and trouble swallowing. Eyes:  Negative for photophobia and visual disturbance. Respiratory:  Negative for choking and shortness of breath. Cardiovascular:  Negative for chest pain and palpitations. Gastrointestinal:  Negative for nausea and vomiting. Musculoskeletal:  Negative for back pain, gait problem, joint swelling, myalgias, neck pain and neck stiffness.    Skin:  Negative for color change. Allergic/Immunologic: Negative for food allergies. Neurological:  Negative for dizziness, tremors, seizures, syncope, facial asymmetry, speech difficulty, weakness, light-headedness, numbness and headaches. Psychiatric/Behavioral:  Negative for behavioral problems, confusion, hallucinations and sleep disturbance. Objective:   /60 (Site: Left Upper Arm, Position: Sitting, Cuff Size: Medium Adult)   Pulse 72   Wt 150 lb (68 kg) Comment: pt report due to being in wheelchair  LMP  (LMP Unknown)   BMI 30.30 kg/m²     Physical Exam  Vitals reviewed. Eyes:      Pupils: Pupils are equal, round, and reactive to light. Cardiovascular:      Rate and Rhythm: Normal rate and regular rhythm. Heart sounds: No murmur heard. Pulmonary:      Effort: Pulmonary effort is normal.      Breath sounds: Normal breath sounds. Abdominal:      General: Bowel sounds are normal.   Musculoskeletal:         General: Normal range of motion. Cervical back: Normal range of motion. Skin:     General: Skin is warm. Neurological:      Mental Status: She is alert and oriented to person, place, and time. Cranial Nerves: No cranial nerve deficit. Sensory: No sensory deficit. Motor: No abnormal muscle tone. Coordination: Coordination normal.      Deep Tendon Reflexes: Reflexes are normal and symmetric. Babinski sign absent on the right side. Babinski sign absent on the left side. 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 There is no definite disconjugate pulse has some degree of double vision. She has baseline walking issues from brainstem stroke and we did not walk her.    Psychiatric:         Mood and Affect: Mood normal.       MRI ORBIT FACE NECK WO CONTRAST    Result Date: 12/9/2022  EXAMINATION: MRI OF THE NECK WITHOUT CONTRAST 12/9/2022 TECHNIQUE: Multiplanar multisequence MRI of the neck was performed without the administration of intravenous contrast. COMPARISON: MRI of the brain, 04/07/2022. HISTORY: ORDERING SYSTEM PROVIDED HISTORY: Giant cell arteritis ([address] TECHNOLOGIST PROVIDED HISTORY: What reading provider will be dictating this exam?->CRC FINDINGS: Prostatic lenses are seen in the globes which are otherwise unremarkable. The optic nerves, extraocular muscles, retro bulbar fat and lacrimal glands are grossly unremarkable. No gross abnormality of the bony orbit is appreciated. The paranasal sinuses are grossly clear. Partially included in the field of view of this study is small area of subcutaneous soft tissue edema in the left temporal region which may be related to patient's history of giant cell arteritis. Small area of cystic encephalomalacia is seen in the right periatrial white matter, likely related to chronic lacunar infarction. Chronic lacunar infarctions are also seen in the bilateral thalami, superior cerebellar hemispheres and the naomi. No acute infarction is seen. 1. Unremarkable MRI of the orbits. 2. Mild subcutaneous edema in the left emporal region which may be related to patient's history of giant cell arteritis. Clinical correlation is needed.  RECOMMENDATIONS: Unavailable       Lab Results   Component Value Date/Time    WBC 5.1 11/16/2022 02:43 PM    RBC 4.18 11/16/2022 02:43 PM    HGB 11.9 11/16/2022 02:43 PM    HCT 35.9 11/16/2022 02:43 PM    MCV 85.8 11/16/2022 02:43 PM    MCH 28.5 11/16/2022 02:43 PM    MCHC 33.2 11/16/2022 02:43 PM    RDW 14.9 11/16/2022 02:43 PM     11/16/2022 02:43 PM    MPV 8.8 08/01/2015 09:33 AM     Lab Results   Component Value Date/Time     05/18/2022 10:56 AM    K 4.4 05/18/2022 10:56 AM    K 4.1 05/11/2022 06:00 AM     05/18/2022 10:56 AM    CO2 19 05/18/2022 10:56 AM    BUN 28 05/18/2022 10:56 AM    CREATININE 1.89 05/18/2022 10:56 AM    GFRAA 31.1 05/18/2022 10:56 AM    LABGLOM 25.7 05/18/2022 10:56 AM    GLUCOSE 131 11/02/2022 02:46 PM    PROT 6.8 05/18/2022 10:56 AM    LABALBU 4.0 05/18/2022 10:56 AM    CALCIUM 9.6 05/18/2022 10:56 AM    BILITOT 0.3 05/18/2022 10:56 AM    ALKPHOS 115 05/18/2022 10:56 AM    AST 18 05/18/2022 10:56 AM    ALT 17 05/18/2022 10:56 AM     Lab Results   Component Value Date/Time    PROTIME 16.7 05/10/2022 12:51 PM    INR 1.4 05/10/2022 12:51 PM     Lab Results   Component Value Date/Time    TSH 0.474 11/30/2020 11:19 AM    NRNXEOQE08 1255 11/30/2020 11:19 AM    FOLATE 11.1 11/30/2020 11:19 AM    FERRITIN 21 05/10/2022 04:41 PM    IRON 25 05/10/2022 04:41 PM    TIBC 372 05/10/2022 04:41 PM     Lab Results   Component Value Date/Time    TRIG 183 04/07/2022 05:29 AM    HDL 33 04/07/2022 05:29 AM    LDLCALC 36 04/07/2022 05:29 AM     Lab Results   Component Value Date/Time    LABAMPH Neg 07/07/2019 02:30 PM    BARBSCNU Neg 07/07/2019 02:30 PM    LABBENZ Neg 07/07/2019 02:30 PM    OPIATESCREENURINE Neg 07/07/2019 02:30 PM    PHENCYCLIDINESCREENURINE Neg 07/07/2019 02:30 PM    ETOH <10 03/18/2021 04:15 PM     No results found for: LITHIUM, DILFRTOT, VALPROATE    Assessment:       Diagnosis Orders   1. Left oculomotor nerve palsy        2. Ptosis of left eyelid  HYDROcodone-acetaminophen (LORCET) 5-325 MG per tablet    C-Reactive Protein    Sedimentation Rate    Acetylcholine Receptor, Blocking    Acetylcholine Receptor, Modulating    Acetylcholine Receptor, Binding      3. Intractable headache, unspecified chronicity pattern, unspecified headache type        4. Ataxia        5. Pseudobulbar affect        6. 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 These findings are not signs of temporal arteritis given the presentation. Sed rate was only 53 and normal for age and CRP was never done.   She had templated biopsy which was reported negative an MRI which was normal.  She is improving even without steroids and this is usually the scenario of this 3rd nerve palsies. At this time not recommended steroids given that her blood sugars only 300 and this does not appear to suggest temporal arteritis it is from all the evaluations noted. We will repeat her sed rate and CRP today and see which way it is going and then consider treatment options if you really have to. This is a new problem for the patient for which we have reviewed. She has considerable pain and this is painful and did not respond to tramadol we will arrange to give her some hydrocodone for now. The neck pain also is contributing to her tightness on the same side and again will respond to some medications. Patient actually seen for other neurological issues and she had a brainstem stroke from which she had recovered with some ataxia and she has memory issues and pseudobulbar affect responsive to neudexta    Is an extended evaluation due to the complexity of the presentation and new diagnosis which we are actually addressing and therefore the charges    Mehulisac Underwood MD, Ronaldo Terrazas, American Board of Psychiatry & Neurology  Board Certified in Vascular Neurology  Board Certified in Neuromuscular Medicine  Certified in [facility]:      Orders Placed This Encounter   Procedures    C-Reactive Protein     Standing Status:   Future     Number of Occurrences:   1     Standing Expiration Date:   12/29/2023    Sedimentation Rate     Standing Status:   Future     Number of Occurrences:   1     Standing Expiration Date:   12/29/2023    Acetylcholine Receptor, Blocking     Standing Status:   Future     Number of Occurrences:   1     Standing Expiration Date:   12/29/2023    Acetylcholine Receptor, Modulating     Standing Status:   Future     Number of Occurrences:   1     Standing Expiration Date:   12/29/2023    Acetylcholine Receptor, Binding     Please do all 3 titers     Standing Status:   Future     Number of Occurrences:   1     Standing Expiration Date: 12/29/2023     Orders Placed This Encounter   Medications    HYDROcodone-acetaminophen (LORCET) 5-325 MG per tablet     Sig: Take 1 tablet by mouth 3 times daily as needed for Pain for up to 10 days. 1 to be taken 3 times a day as needed severe pain in the eye. Max Daily Amount: 30 tablets     Dispense:  30 tablet     Refill:  0     Reduce doses taken as pain becomes manageable       Return in about 3 months (around 3/29/2023).       Benjamín Velez MD

## 2023-01-01 LAB — ACETYLCHOLINE BINDING ANTIBODY: 0 NMOL/L (ref 0–0.4)

## 2023-01-02 LAB
ACETYLCHOL MODUL AB: 3 %
ACETYLCHOLINE BLOCKING AB: 0 % (ref 0–26)

## 2023-01-03 DIAGNOSIS — Z45.2 ENCOUNTER FOR CARE RELATED TO VASCULAR ACCESS PORT: Primary | ICD-10-CM

## 2023-01-03 NOTE — PROGRESS NOTES
Discussed case with Dr Danni Campbell- recommendation for IR consult placed and he can trouble shoot the port to try and get it to draw back. Called pt to notify her but unable to get through. Will call back again later.     Helen Bryan MD

## 2023-01-04 ENCOUNTER — TELEPHONE (OUTPATIENT)
Dept: PALLATIVE CARE | Age: 78
End: 2023-01-04

## 2023-01-04 NOTE — TELEPHONE ENCOUNTER
Pt  calling in wanting to speak to the provider about his wife. He states that her legs are swelling and he has some concerns and would like for you to give him a call please.

## 2023-01-05 ENCOUNTER — HOSPITAL ENCOUNTER (OUTPATIENT)
Dept: INFUSION THERAPY | Age: 78
Setting detail: INFUSION SERIES
Discharge: HOME | End: 2023-01-05
Payer: MEDICARE

## 2023-01-05 VITALS
SYSTOLIC BLOOD PRESSURE: 145 MMHG | RESPIRATION RATE: 18 BRPM | HEART RATE: 72 BPM | DIASTOLIC BLOOD PRESSURE: 66 MMHG | TEMPERATURE: 98.8 F

## 2023-01-05 DIAGNOSIS — Z95.828 PORT-A-CATH IN PLACE: Primary | ICD-10-CM

## 2023-01-05 PROCEDURE — 96523 IRRIG DRUG DELIVERY DEVICE: CPT

## 2023-01-05 PROCEDURE — 2580000003 HC RX 258: Performed by: NURSE PRACTITIONER

## 2023-01-05 PROCEDURE — 6360000002 HC RX W HCPCS: Performed by: NURSE PRACTITIONER

## 2023-01-05 RX ORDER — HEPARIN SODIUM (PORCINE) LOCK FLUSH IV SOLN 100 UNIT/ML 100 UNIT/ML
500 SOLUTION INTRAVENOUS PRN
OUTPATIENT
Start: 2023-01-19

## 2023-01-05 RX ORDER — SODIUM CHLORIDE 0.9 % (FLUSH) 0.9 %
5-40 SYRINGE (ML) INJECTION PRN
Status: DISCONTINUED | OUTPATIENT
Start: 2023-01-05 | End: 2023-01-06 | Stop reason: HOSPADM

## 2023-01-05 RX ORDER — WATER 1000 ML/1000ML
2.2 INJECTION, SOLUTION INTRAVENOUS ONCE
Status: CANCELLED | OUTPATIENT
Start: 2023-01-05 | End: 2023-01-05

## 2023-01-05 RX ORDER — SODIUM CHLORIDE 0.9 % (FLUSH) 0.9 %
5-40 SYRINGE (ML) INJECTION PRN
OUTPATIENT
Start: 2023-01-19

## 2023-01-05 RX ORDER — HEPARIN SODIUM (PORCINE) LOCK FLUSH IV SOLN 100 UNIT/ML 100 UNIT/ML
500 SOLUTION INTRAVENOUS PRN
Status: DISCONTINUED | OUTPATIENT
Start: 2023-01-05 | End: 2023-01-06 | Stop reason: HOSPADM

## 2023-01-05 RX ORDER — WATER 1000 ML/1000ML
2.2 INJECTION, SOLUTION INTRAVENOUS ONCE
OUTPATIENT
Start: 2023-01-19 | End: 2023-01-19

## 2023-01-05 RX ORDER — HEPARIN SODIUM (PORCINE) LOCK FLUSH IV SOLN 100 UNIT/ML 100 UNIT/ML
500 SOLUTION INTRAVENOUS PRN
Status: CANCELLED | OUTPATIENT
Start: 2023-01-05

## 2023-01-05 RX ORDER — SODIUM CHLORIDE 0.9 % (FLUSH) 0.9 %
5-40 SYRINGE (ML) INJECTION PRN
Status: CANCELLED | OUTPATIENT
Start: 2023-01-05

## 2023-01-05 RX ADMIN — HEPARIN 500 UNITS: 100 SYRINGE at 15:03

## 2023-01-05 RX ADMIN — SODIUM CHLORIDE, PRESERVATIVE FREE 20 ML: 5 INJECTION INTRAVENOUS at 15:03

## 2023-01-05 NOTE — TELEPHONE ENCOUNTER
Spoke with spouse yesterday evening. Reports he wanted to give me an update on patient's care. No leg swelling issues. Patient doing well.

## 2023-01-05 NOTE — FLOWSHEET NOTE
Port flush is complete per protocol. Tolerated well. Left the unit via wheelchair. All equipment used in the care for this patient has been cleaned.

## 2023-01-06 ENCOUNTER — TELEPHONE (OUTPATIENT)
Dept: INTERVENTIONAL RADIOLOGY/VASCULAR | Age: 78
End: 2023-01-06

## 2023-01-06 DIAGNOSIS — I87.8 POOR VENOUS ACCESS: Primary | ICD-10-CM

## 2023-01-06 DIAGNOSIS — Z95.828 PORT-A-CATH IN PLACE: ICD-10-CM

## 2023-01-06 DIAGNOSIS — Z45.2 ENCOUNTER FOR CARE RELATED TO VASCULAR ACCESS PORT: ICD-10-CM

## 2023-01-06 DIAGNOSIS — T82.9XXA VASCULAR PORT COMPLICATION, INITIAL ENCOUNTER: ICD-10-CM

## 2023-01-06 NOTE — TELEPHONE ENCOUNTER
Call was made to pt regarding her port check scheduled for 01/09/2023 @ 2:00PM   Pt should Arrive @ 1:30PM

## 2023-01-06 NOTE — TELEPHONE ENCOUNTER
Patient was given this information prior to leaving the office / via phone conversation - voiced understanding  2. Spoke to [place] in [address]: 1/9/2023 @ 2:00 pm  >  You will need to arrive at 1:30 pm from home and check in at the Diagnostic Imaging Check In desk.

## 2023-01-09 DIAGNOSIS — H02.402 PTOSIS OF LEFT EYELID: ICD-10-CM

## 2023-01-09 DIAGNOSIS — H49.02 LEFT OCULOMOTOR NERVE PALSY: Primary | ICD-10-CM

## 2023-01-09 RX ORDER — HYDROCODONE BITARTRATE AND ACETAMINOPHEN 5; 325 MG/1; MG/1
1 TABLET ORAL 3 TIMES DAILY PRN
Qty: 30 TABLET | Refills: 0 | Status: SHIPPED | OUTPATIENT
Start: 2023-01-09 | End: 2023-01-10 | Stop reason: SDUPTHER

## 2023-01-09 NOTE — TELEPHONE ENCOUNTER
WRONG QUANTITY SENT WITH LAST SCRIPT PLEASE RE SEND. Requested Prescriptions     Pending Prescriptions Disp Refills    HYDROcodone-acetaminophen (LORCET) 5-325 MG per tablet 90 tablet 0     Sig: Take 1 tablet by mouth 3 times daily as needed for Pain for up to 30 days. 1 to be taken 3 times a day as needed severe pain in the eye.  Max Daily Amount: 3 tablets

## 2023-01-09 NOTE — TELEPHONE ENCOUNTER
Requested Prescriptions     Pending Prescriptions Disp Refills    HYDROcodone-acetaminophen (LORCET) 5-325 MG per tablet 30 tablet 0     Sig: Take 1 tablet by mouth 3 times daily as needed for Pain for up to 30 days. 1 to be taken 3 times a day as needed severe pain in the eye.  Max Daily Amount: 3 tablets

## 2023-01-10 RX ORDER — HYDROCODONE BITARTRATE AND ACETAMINOPHEN 5; 325 MG/1; MG/1
1 TABLET ORAL 3 TIMES DAILY PRN
Qty: 90 TABLET | Refills: 0 | Status: SHIPPED | OUTPATIENT
Start: 2023-01-10 | End: 2023-02-09

## 2023-01-11 ENCOUNTER — OFFICE VISIT (OUTPATIENT)
Dept: FAMILY MEDICINE CLINIC | Age: 78
End: 2023-01-11

## 2023-01-11 VITALS
OXYGEN SATURATION: 98 % | HEART RATE: 80 BPM | DIASTOLIC BLOOD PRESSURE: 60 MMHG | BODY MASS INDEX: 31.25 KG/M2 | WEIGHT: 155 LBS | HEIGHT: 59 IN | SYSTOLIC BLOOD PRESSURE: 122 MMHG | TEMPERATURE: 97.7 F

## 2023-01-11 DIAGNOSIS — E11.43 DIABETIC AUTONOMIC NEUROPATHY ASSOCIATED WITH TYPE 2 DIABETES MELLITUS (HCC): ICD-10-CM

## 2023-01-11 DIAGNOSIS — L21.9 SEBORRHEIC DERMATITIS: ICD-10-CM

## 2023-01-11 DIAGNOSIS — L02.92 BOIL: ICD-10-CM

## 2023-01-11 DIAGNOSIS — R10.30 LOWER ABDOMINAL PAIN: Primary | ICD-10-CM

## 2023-01-11 DIAGNOSIS — N18.4 STAGE 4 CHRONIC KIDNEY DISEASE (HCC): ICD-10-CM

## 2023-01-11 DIAGNOSIS — F33.42 MAJOR DEPRESSIVE DISORDER, RECURRENT, IN FULL REMISSION (HCC): ICD-10-CM

## 2023-01-11 DIAGNOSIS — G40.909 SEIZURE DISORDER (HCC): ICD-10-CM

## 2023-01-11 DIAGNOSIS — E78.00 PURE HYPERCHOLESTEROLEMIA: ICD-10-CM

## 2023-01-11 DIAGNOSIS — H02.402 PTOSIS OF LEFT EYELID: ICD-10-CM

## 2023-01-11 DIAGNOSIS — I65.23 BILATERAL CAROTID ARTERY STENOSIS: ICD-10-CM

## 2023-01-11 DIAGNOSIS — N18.4 CHRONIC KIDNEY DISEASE, STAGE 4 (SEVERE) (HCC): ICD-10-CM

## 2023-01-11 DIAGNOSIS — Q27.30 AVM (ARTERIOVENOUS MALFORMATION): ICD-10-CM

## 2023-01-11 DIAGNOSIS — E11.65 UNCONTROLLED TYPE 2 DIABETES MELLITUS WITH HYPERGLYCEMIA (HCC): ICD-10-CM

## 2023-01-11 DIAGNOSIS — I50.42 CHRONIC COMBINED SYSTOLIC AND DIASTOLIC CHF (CONGESTIVE HEART FAILURE) (HCC): ICD-10-CM

## 2023-01-11 DIAGNOSIS — I10 ESSENTIAL HYPERTENSION: ICD-10-CM

## 2023-01-11 DIAGNOSIS — J44.9 CHRONIC OBSTRUCTIVE PULMONARY DISEASE, UNSPECIFIED COPD TYPE (HCC): ICD-10-CM

## 2023-01-11 RX ORDER — AMOXICILLIN 500 MG/1
500 CAPSULE ORAL 2 TIMES DAILY
Qty: 20 CAPSULE | Refills: 0 | Status: SHIPPED | OUTPATIENT
Start: 2023-01-11 | End: 2023-01-21

## 2023-01-11 RX ORDER — KETOCONAZOLE 20 MG/ML
SHAMPOO TOPICAL
Qty: 120 ML | Refills: 3 | Status: SHIPPED | OUTPATIENT
Start: 2023-01-11

## 2023-01-11 RX ORDER — AMOXICILLIN AND CLAVULANATE POTASSIUM 875; 125 MG/1; MG/1
1 TABLET, FILM COATED ORAL 2 TIMES DAILY
Qty: 20 TABLET | Refills: 0 | Status: CANCELLED | OUTPATIENT
Start: 2023-01-11 | End: 2023-01-21

## 2023-01-11 RX ORDER — KETOCONAZOLE 20 MG/ML
SHAMPOO TOPICAL
Qty: 120 ML | Refills: 3 | Status: SHIPPED | OUTPATIENT
Start: 2023-01-11 | End: 2023-01-11

## 2023-01-11 ASSESSMENT — PATIENT HEALTH QUESTIONNAIRE - PHQ9
1. LITTLE INTEREST OR PLEASURE IN DOING THINGS: 0
6. FEELING BAD ABOUT YOURSELF - OR THAT YOU ARE A FAILURE OR HAVE LET YOURSELF OR YOUR FAMILY DOWN: 0
SUM OF ALL RESPONSES TO PHQ QUESTIONS 1-9: 1
10. IF YOU CHECKED OFF ANY PROBLEMS, HOW DIFFICULT HAVE THESE PROBLEMS MADE IT FOR YOU TO DO YOUR WORK, TAKE CARE OF THINGS AT HOME, OR GET ALONG WITH OTHER PEOPLE: 0
9. THOUGHTS THAT YOU WOULD BE BETTER OFF DEAD, OR OF HURTING YOURSELF: 0
SUM OF ALL RESPONSES TO PHQ9 QUESTIONS 1 & 2: 1
SUM OF ALL RESPONSES TO PHQ QUESTIONS 1-9: 1
SUM OF ALL RESPONSES TO PHQ QUESTIONS 1-9: 1
8. MOVING OR SPEAKING SO SLOWLY THAT OTHER PEOPLE COULD HAVE NOTICED. OR THE OPPOSITE, BEING SO FIGETY OR RESTLESS THAT YOU HAVE BEEN MOVING AROUND A LOT MORE THAN USUAL: 0
3. TROUBLE FALLING OR STAYING ASLEEP: 0
7. TROUBLE CONCENTRATING ON THINGS, SUCH AS READING THE NEWSPAPER OR WATCHING TELEVISION: 0
4. FEELING TIRED OR HAVING LITTLE ENERGY: 0
2. FEELING DOWN, DEPRESSED OR HOPELESS: 1
5. POOR APPETITE OR OVEREATING: 0
SUM OF ALL RESPONSES TO PHQ QUESTIONS 1-9: 1

## 2023-01-11 NOTE — PROGRESS NOTES
Chief Complaint   Patient presents with    Bleeding/Bruising     Pt is here for bruising on the abdomen. Denies pain unless bruise is being pressed. Pt has sores on scalp too & are painful. Scaly, no open wounds & no bleeding. HPI: Tsering Kelly 65 y.o. female presenting for     Eye complaints and headache  Patinet was seen by ophthalmologist   At that time, patient was complaining of a temporal head pain   Ophthalmologist was concerned for GCA and contacted PCP to prescribe high dose prednisone for patient. Patient reports she did not start the medication due to ophthalmologist calling them back and told them to hold off on the medication. It was recommended by ophthalmologist to see the rheumatologist for elevated CRP . Family was also concerned her pain was secondary to a tooth infection. After an evaluation by the dentist, it is believe this is not the source. F/u   Patient was seeing the neurolgist   Patient reports taht he was given pain medications to help with symptoms   Thinks the ptosis was casued by diabetes not GCA. Patient is doing better overall. Scalp dermatitis   Patient is complaining of plaques on the scalp   Going on for some time   Tried mometasone with no relief of symptoms   Patient tried head and shoulders which did help some (has been using it for 1 week). Denies any discharge       Boil on abdomen. Patient was seen on the walk-in clinic. Was given antibiotics. Does feel like there is improving. Denies any fevers, chills, nausea, vomiting, chest pain, shortness of breath, abdominal pain, urination, change in stools. Follow  Has come back   Denies any discharge. Was given abx (keflex in the walk in clinic) and it improved about 1.5 years ago.         Current Outpatient Medications   Medication Sig Dispense Refill    amoxicillin (AMOXIL) 500 MG capsule Take 1 capsule by mouth 2 times daily for 10 days 20 capsule 0    ketoconazole (NIZORAL) 2 % shampoo Apply 5 to 10 mL to wet scalp, lather, leave on 3 to 5 minutes, and rinse; apply twice weekly for 2 to 4 weeks. 120 mL 3    HYDROcodone-acetaminophen (LORCET) 5-325 MG per tablet Take 1 tablet by mouth 3 times daily as needed for Pain for up to 30 days. 1 to be taken 3 times a day as needed severe pain in the eye. Max Daily Amount: 3 tablets 90 tablet 0    traMADol (ULTRAM) 50 MG tablet Take 0.5 tablets by mouth 2 times daily as needed. mometasone (ELOCON) 0.1 % cream As needed. Respiratory Therapy Supplies (NEBULIZER/TUBING/MOUTHPIECE) KIT 1 kit by Does not apply route as needed (SOB/wheezing) 1 kit 0    carvedilol (COREG) 25 MG tablet TAKE 1 & 1/2 (ONE & ONE-HALF) TABLETS BY MOUTH TWICE DAILY WITH MEALS 270 tablet 0    digoxin (LANOXIN) 125 MCG tablet TAKE 1 TABLET BY MOUTH EVERY OTHER DAY 45 tablet 0    amLODIPine (NORVASC) 5 MG tablet Take 1 tablet by mouth once daily 90 tablet 0    rosuvastatin (CRESTOR) 40 MG tablet TAKE 1 TABLET BY MOUTH ONCE DAILY IN THE EVENING 90 tablet 0    blood glucose monitor kit and supplies Dispense one meter that insurance will cover.  1 kit 0    blood glucose monitor strips Test 3x daily E11.65 100 strip 3    Lancets MISC 1 each by Does not apply route daily 100 each 3    dextromethorphan-quiNIDine (NUEDEXTA) 20-10 MG CAPS per capsule Take 1 capsule by mouth in the morning and at bedtime 6 SAMPLES GIVEN:   LOT: [id number]  EXP: 01/23 60 capsule 78    insulin lispro (HUMALOG) 100 UNIT/ML SOLN injection vial INJECT SUBCUTANEOUSLY THREE TIMES DAILY- 24 UNITS BEFORE LUNCH, 24 UNITS MIDDAY SNACK, AND INCREASE TO 36 UNITS BEFORE DINNER 30 mL 3    Continuous Blood Gluc Sensor (FREESTYLE MURALI 2 SENSOR) MISC 1 Device by Does not apply route every 14 days 2 each 3    glucose 4 g chewable tablet Take 4 tablets by mouth as needed for Low blood sugar 60 tablet 3    blood glucose monitor kit and supplies Give 1 meter covered by insurance 1 kit 0    blood glucose monitor strips Test 2x daily 100 strip 3    Lancets MISC Pt test 2x daily 50 each 3    insulin glargine (LANTUS) 100 UNIT/ML injection vial INJECT 75 UNITS AT BEDTIME, 30 UNITS IN THE MORNING  .  E11.69 40 mL 3    Ostomy Supplies (SKIN TAC ADHESIVE BARRIER WIPE) MISC 1 packet by Does not apply route once a week 50 each 5    nystatin (MYCOSTATIN) 452034 UNIT/GM powder Apply topically 4 times daily. 1 each 3    Handicap Placard MISC by Does not apply route Good from 10/6/22 till 10/6/27 1 each 0    pantoprazole (PROTONIX) 40 MG tablet Take 1 tablet by mouth every morning (before breakfast) 90 tablet 3    ACCU-CHEK PHYLLIS PLUS strip Test 3x daily Dx E11.65 100 each 3    Continuous Blood Gluc  (FREESTYLE MURALI 2 READER) [place] 1 Device by Does not apply route 4 times daily (before meals and nightly) 1 each 0    fluocinonide (LIDEX) 0.05 % external solution Apply topically 2 times daily.  60 mL 0    dextromethorphan-quiNIDine (NUEDEXTA) 20-10 MG CAPS per capsule Take 1 capsule by mouth daily 30 capsule 3    nitrofurantoin (MACRODANTIN) 50 MG capsule One po daily 90 capsule 3    ferrous sulfate (IRON 325) 325 (65 Fe) MG tablet Take 1 tablet by mouth every other day 30 tablet 3    Glucagon (GVOKE HYPOPEN 2-PACK) 1 MG/0.2ML SOAJ Inject 1 mg into the skin every 15 minutes as needed (glucose less tahtn 70 or if symptomatic) 2 each 3    albuterol-ipratropium (COMBIVENT RESPIMAT)  MCG/ACT AERS inhaler Inhale 1 puff into the lungs every 6 hours as needed for Wheezing or Shortness of Breath 4 g 5    ipratropium-albuterol (DUONEB) 0.5-2.5 (3) MG/3ML SOLN nebulizer solution Inhale 3 mLs into the lungs 3 times daily 360 mL 0    Cranberry 500 MG CAPS Take 500 mg by mouth daily      Cholecalciferol (VITAMIN D3) 50 MCG (2000 UT) CAPS Take 1,000 Units by mouth 2 times daily       lidocaine-prilocaine (EMLA) 2.5-2.5 % cream APPLY CREAM TOPICALLY TO PORT SITE ONE HOUR PRIOR TO APPOINTMENT AS NEEDED      Accu-Chek Softclix Lancets MISC bid 100 each 3 Blood Glucose Monitoring Suppl (ACCU-CHEK PHYLLIS CONNECT) w/Device KIT 1 kit by Does not apply route daily 1 kit 0     No current facility-administered medications for this visit. Past Medical History:   Diagnosis Date    Adenomatous polyp of sigmoid colon     Adenomatous polyp of transverse colon     Anxiety     Asthma     dx 2019 / has smoked since age 11    Brainstem stroke ([facility]     Cardiopulmonary arrest ([facility]     CHF (congestive heart failure) (HCC)     Chronic back pain     Bilateral L5 S1 Radic on emg--surprisingly worse on the left than the right--pt's symptoms and her MRI show worse on the right    Chronic obstructive pulmonary disease with acute exacerbation ([facility] 10/12/2019    Depression     ESBL E. coli carrier     Carrier. Fibromyalgia     Gastrointestinal hemorrhage 2/24/2020    Hyperlipidemia     meds > 8 yrs    Hypertension     meds > 45 yrs    Insomnia 12/4/2013    On home O2     2l per n/c at bedtime mostly,     Osteoarthritis     Seizures ([facility]     Sepsis ([facility] 10/6/2020    Smoker 6/18/2013    Type II diabetes mellitus, uncontrolled     hx > 8 yrs    Unspecified sleep apnea         Past Surgical History:   Procedure Laterality Date    BACK SURGERY  2017    lumbar disc    CARDIAC CATHETERIZATION  11/03/2014    DR. MIRELES / no stents    COLONOSCOPY  08/29/2016    w/polypectomy     COLONOSCOPY N/A 09/29/2020    COLONOSCOPY WITH POLYPECTOMY performed by Bel Valladares MD at [address] N/A 10/07/2019    EUA HYSTEROSCOPY DILATATION AND CURETTAGE performed by Gentry Cesar DO at [address] [facility], COLON, DIAGNOSTIC      EYE SURGERY      Phaco with IOL OU / [address] [address]    umbilical hernia repair    IR PORT PLACEMENT EQUAL OR GREATER THAN 5 YEARS  5/14/2021    IR PORT PLACEMENT EQUAL OR GREATER THAN 5 YEARS 5/14/2021 MLOZ SPECIAL PROCEDURE    MI ESOPHAGOGASTRODUODENOSCOPY TRANSORAL DIAGNOSTIC N/A 03/24/2017 EGD ESOPHAGOGASTRODUODENOSCOPY performed by Mary Alice English MD at [address] [place] [address] N/A 2018    negative findings    SD REVISE MEDIAN N/CARPAL TUNNEL SURG Left 2017    LEFT  CARPAL TUNNEL RELEASE performed by Elena Tavares MD at [address] [facility] QLBP,0+I/Q,HYSDC N/A 2018    TONSILLECTOMY      as child    TUNNELED VENOUS PORT PLACEMENT Right 2021    [address] [employer] Port ClearVUE by Dr. Caitlin Figueroa  2016    w/bx     UPPER GASTROINTESTINAL ENDOSCOPY N/A 2020    EGD possible biopsy performed by Loulou Barton MD at [address] [facility] N/A 2020    EGD PUSH ENTEROSCOPY performed by Moe Turner MD at [facility]        Family History   Problem Relation Age of Onset    Heart Disease Father         cardiac bypass    Arthritis Father     Arthritis Mother     Other Mother          at age 80    Other Sister         [facility]    No Known Problems Daughter     Stroke Son         Social History     Socioeconomic History    Marital status:      Spouse name: Kaylie Torrez    Number of children: 2    Years of education: 12    Highest education level: High school graduate   Occupational History    Occupation: Retired-   Tobacco Use    Smoking status: Former     Packs/day: 1.00     Years: 59.00     Pack years: 59.00     Types: Cigarettes     Start date: 2017     Quit date: 10/1/2020     Years since quittin.2    Smokeless tobacco: Never   Vaping Use    Vaping Use: Never used   Substance and Sexual Activity    Alcohol use: Not Currently    Drug use: No    Sexual activity: Yes     Partners: Male   Other Topics Concern    Not on file   Social History Narrative    Grew up in [address] [place] [address] With:  Librado Spouse    Type of Home: [address] [place] [address]: Two level, Performs ADL's on one level    Home Access: Stairs to enter with rails    Entrance Stairs - Number of Steps: 4    Bathroom Shower/Tub: Tub/Shower unit, Doors    Bathroom Equipment: Shower chair, Grab bars in [place]: Rolling walker, Cane, Oxygen(uses her O2 very seldom)    ADL Assistance: Needs assistance    Homemaking Assistance: Needs assistance    Homemaking Responsibilities: No(spouse performs)    Ambulation Assistance: Independent    Transfer Assistance: Independent    Active : No    Occupation: Retired    Type of occupation: worked in [employer]: patient enjoys [employer]     Social Determinants of Health     Financial Resource Strain: Low Risk     Difficulty of Paying Living Expenses: Not hard at all   Food Insecurity: No Food Insecurity    Worried About [address] in the Last Year: Never true    [address] [facility] [address] in the Last Year: Never true   Transportation Needs: Not on file   Physical Activity: Not on file   Stress: Not on file   Social Connections: Not on file   Intimate Partner Violence: Not on file   Housing Stability: Not on file        /60   Pulse 80   Temp 97.7 °F (36.5 °C) (Temporal)   Ht 4' 11\" (1.499 m)   Wt 155 lb (70.3 kg)   LMP  (LMP Unknown)   SpO2 98%   BMI 31.31 kg/m²        Physical Exam:    Physical Exam:  General Appearance: alert and oriented to person, place and time, well developed and well- nourished, in no acute distress  Skin: warm and dry, no rash or erythema  Head: normocephalic and atraumatic  Eyes: pupils equal, round, and reactive to light, extraocular eye movements intact, conjunctivae normal  ENT: tympanic membrane, external ear and ear canal normal bilaterally, nose without deformity, nasal mucosa and turbinates normal without polyps  Neck: supple and non-tender without mass, no thyromegaly or thyroid nodules, no cervical lymphadenopathy  Pulmonary/Chest: Distant heard.   Cardiovascular: normal rate, regular rhythm, normal S1 and S2, no murmurs, rubs, clicks, or gallops, distal pulses intact, no carotid bruits  Abdomen:  on the anterior abdomen there is a dusky circular area that is non tender. Tenderness in the lower abdomen bilaterally. Extremities: no cyanosis, clubbing or edema  Musculoskeletal: normal range of motion, no joint swelling, deformity or tenderness  Neurologic: reflexes normal and symmetric, no cranial nerve deficit, gait, coordination and speech normal  Skin: thick plaque son the anterior sclap. Puritic and thick in nature. Labs   TSH   Date Value Ref Range Status   05/18/2022 0.986 0.440 - 3.860 uIU/mL Final   08/27/2020 0.777 0.440 - 3.860 uIU/mL Final     TSH   Date Value Ref Range Status   11/30/2020 0.474 0.440 - 3.860 uIU/mL Final   10/05/2020 1.070 0.440 - 3.860 uIU/mL Final   10/13/2019 1.200 0.440 - 3.860 uIU/mL Final   05/03/2014 0.421 0.270 - 4.200 uIU/mL Final     Lab Results   Component Value Date     12/29/2022    K 4.5 12/29/2022     12/29/2022    CO2 21 12/29/2022    BUN 28 (H) 12/29/2022    CREATININE 1.72 (H) 12/29/2022    GLUCOSE 246 (H) 12/29/2022    CALCIUM 10.3 (H) 12/29/2022    PROT 6.8 05/18/2022    LABALBU 4.3 12/29/2022    BILITOT 0.3 05/18/2022    ALKPHOS 115 05/18/2022    AST 18 05/18/2022    ALT 17 05/18/2022    LABGLOM 30.0 (L) 12/29/2022    GFRAA 31.1 (L) 05/18/2022    GLOB 2.8 05/18/2022     Lab Results   Component Value Date    WBC 5.8 12/29/2022    HGB 12.2 12/29/2022    HCT 38.4 12/29/2022    MCV 87.5 12/29/2022     12/29/2022     Lab Results   Component Value Date    LABA1C 9.0 (H) 11/02/2022     No results found for: EAG      A/P: Ambar Connors 65 y.o. female presenting for     1. Lower abdominal pain  Tenderness on examination. Will order xray of the abdomen for further evaluation   - XR ABDOMEN (KUB) (SINGLE AP VIEW); Future    2. Boil    - amoxicillin (AMOXIL) 500 MG capsule; Take 1 capsule by mouth 2 times daily for 10 days  Dispense: 20 capsule; Refill: 0    3.  Chronic obstructive pulmonary disease, unspecified COPD type ([facility]      4. Chronic combined systolic and diastolic CHF (congestive heart failure) ([facility]      5. Major depressive disorder, recurrent, in full remission ([facility]  Stable     6. Diabetic autonomic neuropathy associated with type 2 diabetes mellitus (HCC)      7. Stage 4 chronic kidney disease ([facility]  Managed by renal     8. Seizure disorder ([facility])  Stable    9. Seborrheic dermatitis    - ketoconazole (NIZORAL) 2 % shampoo; Apply 5 to 10 mL to wet scalp, lather, leave on 3 to 5 minutes, and rinse; apply twice weekly for 2 to 4 weeks. Dispense: 120 mL; Refill: 3    10. Uncontrolled type 2 diabetes mellitus with hyperglycemia ([facility])      11. Ptosis of left eyelid      12. Chronic kidney disease, stage 4 (severe) ([facility])    13. Pure hypercholesterolemia      14. Essential hypertension      15. AVM (arteriovenous malformation)      16. Bilateral carotid artery stenosis            Please note, this report has been partially produced using speech recognition software  and may cause  and /or contain errors related to that system including grammar, punctuation and spelling as well as words and phrases that may seem inappropriate. If there are questions or concerns please feel free to contact me to clarify.

## 2023-01-12 DIAGNOSIS — K59.00 CONSTIPATION, UNSPECIFIED CONSTIPATION TYPE: Primary | ICD-10-CM

## 2023-01-12 RX ORDER — POLYETHYLENE GLYCOL 3350 17 G/17G
17 POWDER, FOR SOLUTION ORAL DAILY PRN
Qty: 1530 G | Refills: 1 | Status: SHIPPED | OUTPATIENT
Start: 2023-01-12 | End: 2023-02-11

## 2023-02-02 RX ORDER — FLASH GLUCOSE SCANNING READER
EACH MISCELLANEOUS
Qty: 1 EACH | Refills: 0 | Status: SHIPPED | OUTPATIENT
Start: 2023-02-02

## 2023-02-07 PROBLEM — Z86.73 HISTORY OF CVA (CEREBROVASCULAR ACCIDENT): Chronic | Status: ACTIVE | Noted: 2022-04-06

## 2023-02-17 ENCOUNTER — OFFICE VISIT (OUTPATIENT)
Dept: PALLATIVE CARE | Age: 78
End: 2023-02-17
Payer: MEDICARE

## 2023-02-17 VITALS
DIASTOLIC BLOOD PRESSURE: 62 MMHG | HEART RATE: 81 BPM | TEMPERATURE: 98.2 F | RESPIRATION RATE: 18 BRPM | SYSTOLIC BLOOD PRESSURE: 138 MMHG | OXYGEN SATURATION: 95 %

## 2023-02-17 DIAGNOSIS — M79.641 PAIN IN BOTH HANDS: ICD-10-CM

## 2023-02-17 DIAGNOSIS — M79.642 PAIN IN BOTH HANDS: ICD-10-CM

## 2023-02-17 DIAGNOSIS — R53.82 CHRONIC FATIGUE: ICD-10-CM

## 2023-02-17 DIAGNOSIS — F48.2 PSEUDOBULBAR AFFECT: ICD-10-CM

## 2023-02-17 DIAGNOSIS — G47.9 SLEEP DISTURBANCE: ICD-10-CM

## 2023-02-17 DIAGNOSIS — N18.32 STAGE 3B CHRONIC KIDNEY DISEASE (HCC): ICD-10-CM

## 2023-02-17 DIAGNOSIS — Z51.5 PALLIATIVE CARE ENCOUNTER: ICD-10-CM

## 2023-02-17 DIAGNOSIS — I50.42 CHRONIC COMBINED SYSTOLIC AND DIASTOLIC CHF (CONGESTIVE HEART FAILURE) (HCC): ICD-10-CM

## 2023-02-17 DIAGNOSIS — J44.9 CHRONIC OBSTRUCTIVE PULMONARY DISEASE, UNSPECIFIED COPD TYPE (HCC): ICD-10-CM

## 2023-02-17 DIAGNOSIS — E11.42 DIABETIC PERIPHERAL NEUROPATHY (HCC): Primary | ICD-10-CM

## 2023-02-17 DIAGNOSIS — R45.86 EMOTIONAL LABILITY: ICD-10-CM

## 2023-02-17 PROCEDURE — 99349 HOME/RES VST EST MOD MDM 40: CPT | Performed by: NURSE PRACTITIONER

## 2023-02-17 PROCEDURE — 1123F ACP DISCUSS/DSCN MKR DOCD: CPT | Performed by: NURSE PRACTITIONER

## 2023-02-17 PROCEDURE — G8417 CALC BMI ABV UP PARAM F/U: HCPCS | Performed by: NURSE PRACTITIONER

## 2023-02-17 PROCEDURE — 1090F PRES/ABSN URINE INCON ASSESS: CPT | Performed by: NURSE PRACTITIONER

## 2023-02-17 PROCEDURE — 1036F TOBACCO NON-USER: CPT | Performed by: NURSE PRACTITIONER

## 2023-02-17 PROCEDURE — 3074F SYST BP LT 130 MM HG: CPT | Performed by: NURSE PRACTITIONER

## 2023-02-17 PROCEDURE — 3078F DIAST BP <80 MM HG: CPT | Performed by: NURSE PRACTITIONER

## 2023-02-17 PROCEDURE — G8484 FLU IMMUNIZE NO ADMIN: HCPCS | Performed by: NURSE PRACTITIONER

## 2023-02-17 RX ORDER — GABAPENTIN 100 MG/1
100 CAPSULE ORAL 2 TIMES DAILY
Qty: 60 CAPSULE | Refills: 0 | Status: SHIPPED | OUTPATIENT
Start: 2023-02-17 | End: 2023-03-19

## 2023-02-17 ASSESSMENT — ENCOUNTER SYMPTOMS
DIARRHEA: 0
SHORTNESS OF BREATH: 1
CHOKING: 0
BACK PAIN: 0
EYE DISCHARGE: 0
COUGH: 0
TROUBLE SWALLOWING: 0
ABDOMINAL PAIN: 0
NAUSEA: 0
WHEEZING: 0
VOMITING: 0
CONSTIPATION: 0
COLOR CHANGE: 0
BLOOD IN STOOL: 0

## 2023-02-17 NOTE — PROGRESS NOTES
Subjective:      Patient Id: Seen Elizabeth Lara at home in [facility] for follow-up palliative medicine visit. She was accompanied to the appointment by: spouse, Gustavo Whitlock, and daughter Seth Bucio. Chief Complaint   Patient presents with    Hand Pain        HPI       Mee Goddard is a 65 y.o. female with a complex medical history that includes GI bleeding throughout the distal small intestine, anxiety, asthma, CHF, COPD, CKD depression, fibromyalgia, OA, cardiac arrest, seizures, DM II, CVA, sleep apnea, and pseudobulbar effect. General: Patient is alert and in NAD. Eyelid ptosis has resolved per patient. Hx. Of CVA: Had recent stroke in April. MRI from 4/7/22 showed new small right CVA. Not currently on anticoagulation due to increased risk for bleeding due to AVM malformation. COPD/asthma: No recent issues with breathing. Using nebulizers at least once daily. No wheezing. No cough. Has SOB with exertion at baseline. CHF: No leg swelling. No chest pain. Following with Dr. Rosanne Brian and cardiology. Hand pain/neuropathy: Patient reports her bilateral hand pain has worsened. She does not feel it is tolerable. Describes pain as pins and needles pain as well as numbness. Skin: Reports small discolored hardened area to left lower stomach. Mood/anxiety/depression: Patient with intermittent \"crying spells. \" Currently following with neurology Dr. Jackie Palacios. Taking Nuedexta BID with good control of symptoms. Previously diagnosed with pseudobulbar affect. Sleep: Patient states that her sleep schedule has remained stable. Ongoing fatigue. Patient has been participating in more household activities. T2DM: Blood sugars have been running in the 300s. Continues to follow with endocrine.        Past Medical History:   Diagnosis Date    Adenomatous polyp of sigmoid colon     Adenomatous polyp of transverse colon     Anxiety     Asthma     dx 2019 / has smoked since age 11    Brainstem stroke [facility])     Cardiopulmonary arrest [facility])     CHF (congestive heart failure) (HCC)     Chronic back pain     Bilateral L5 S1 Radic on emg--surprisingly worse on the left than the right--pt's symptoms and her MRI show worse on the right    Chronic obstructive pulmonary disease with acute exacerbation ([address] 10/12/2019    Depression     ESBL E. coli carrier     Carrier. Fibromyalgia     Gastrointestinal hemorrhage 2/24/2020    Hyperlipidemia     meds > 8 yrs    Hypertension     meds > 45 yrs    Insomnia 12/4/2013    On home O2     2l per n/c at bedtime mostly,     Osteoarthritis     Seizures ([address]     Sepsis ([address] 10/6/2020    Smoker 6/18/2013    Type II diabetes mellitus, uncontrolled     hx > 8 yrs    Unspecified sleep apnea      Past Surgical History:   Procedure Laterality Date    BACK SURGERY  2017    lumbar disc    CARDIAC CATHETERIZATION  11/03/2014    DR. MIRELES / no stents    COLONOSCOPY  08/29/2016    w/polypectomy     COLONOSCOPY N/A 09/29/2020    COLONOSCOPY WITH POLYPECTOMY performed by Simeon Mar MD at [address] N/A 10/07/2019    EUA HYSTEROSCOPY DILATATION AND CURETTAGE performed by Hema Calge DO at [address] [facility], COLON, DIAGNOSTIC      EYE SURGERY      [place] with IOL OU / [address] [address]    umbilical hernia repair    IR PORT PLACEMENT EQUAL OR GREATER THAN 5 YEARS  5/14/2021    IR PORT PLACEMENT EQUAL OR GREATER THAN 5 YEARS 5/14/2021 [facility] SPECIAL PROCEDURE    IN ESOPHAGOGASTRODUODENOSCOPY TRANSORAL DIAGNOSTIC N/A 03/24/2017    EGD ESOPHAGOGASTRODUODENOSCOPY performed by Sherren Conner, MD at [address]"" ABD PRTM&OMENTUM DX W/WO SPEC BR/WA SPX N/A 02/08/2018    negative findings    IN NEUROPLASTY &/TRANSPOS MEDIAN NRV CARPAL TUNNE Left 06/05/2017    LEFT  CARPAL TUNNEL RELEASE performed by Blanac Andujar MD at [address] [facility] UMBILICAL HRNA 5 YRS/> REDUCIBLE N/A 02/08/2018    TONSILLECTOMY      as child    TUNNELED VENOUS PORT PLACEMENT Right 2021    [address] [employer] Port ClearVUE by Dr. Selin Vasquez  2016    w/bx     UPPER GASTROINTESTINAL ENDOSCOPY N/A 2020    EGD possible biopsy performed by Rachell Gates MD at [facility] N/A 2020    EGD PUSH ENTEROSCOPY performed by Moncho Fajardo MD at [address] [place] [address] History    Marital status:      Spouse name: Reece Ryan    Number of children: 2    Years of education: 12    Highest education level: High school graduate   Occupational History    Occupation: Retired-   Tobacco Use    Smoking status: Former     Packs/day: 1.00     Years: 59.00     Pack years: 59.00     Types: Cigarettes     Start date: 2017     Quit date: 10/1/2020     Years since quittin.3    Smokeless tobacco: Never   Vaping Use    Vaping Use: Never used   Substance and Sexual Activity    Alcohol use: Not Currently    Drug use: No    Sexual activity: Yes     Partners: Male   Other Topics Concern    Not on file   Social History Narrative    Grew up in [address] [place] [address] With:  Reece Ryan Spouse    Type of Home: House    Home Layout: Two level, Performs ADL's on one level    Home Access: Stairs to enter with rails    Entrance Stairs - Number of Steps: 4    Bathroom Shower/Tub: Tub/Shower unit, Doors    Bathroom Equipment: Shower chair, Grab bars in [place]: Rolling walker, Cane, Oxygen(uses her O2 very seldom)    ADL Assistance: Needs assistance    Homemaking Assistance: Needs assistance    Homemaking Responsibilities: No(spouse performs)    Ambulation Assistance: Independent    Transfer Assistance: Independent    Active : No    Occupation: Retired    Type of occupation: worked in [employer]: patient enjoys [employer]     Social Determinants of Health     Financial Resource Strain: Low Risk     Difficulty of Paying Living Expenses: Not hard at all   Food Insecurity: No Food Insecurity    Worried About Running Out of Food in the Last Year: Never true    Ran Out of Food in the Last Year: Never true   Transportation Needs: Not on file   Physical Activity: Not on file   Stress: Not on file   Social Connections: Not on file   Intimate Partner Violence: Not on file   Housing Stability: Not on file     Family History   Problem Relation Age of Onset    Heart Disease Father         cardiac bypass    Arthritis Father     Arthritis Mother     Other Mother          at age 80    Other Sister         [facility]    No Known Problems Daughter     Stroke Son      Allergies   Allergen Reactions    Ibuprofen Nausea Only    Metformin And Related      Diarrhea      Darvon [Propoxyphene Hcl] Nausea And Vomiting     Current Outpatient Medications on File Prior to Visit   Medication Sig Dispense Refill    Continuous Blood Gluc  (FREESTYLE MURALI 2 READER) [place] Give 1 reader Dx E11.65 1 each 0    ketoconazole (NIZORAL) 2 % shampoo Apply 5 to 10 mL to wet scalp, lather, leave on 3 to 5 minutes, and rinse; apply twice weekly for 2 to 4 weeks. 120 mL 3    traMADol (ULTRAM) 50 MG tablet Take 0.5 tablets by mouth 2 times daily as needed. mometasone (ELOCON) 0.1 % cream As needed. Respiratory Therapy Supplies (NEBULIZER/TUBING/MOUTHPIECE) KIT 1 kit by Does not apply route as needed (SOB/wheezing) 1 kit 0    carvedilol (COREG) 25 MG tablet TAKE 1 & 1/2 (ONE & ONE-HALF) TABLETS BY MOUTH TWICE DAILY WITH MEALS 270 tablet 0    digoxin (LANOXIN) 125 MCG tablet TAKE 1 TABLET BY MOUTH EVERY OTHER DAY 45 tablet 0    amLODIPine (NORVASC) 5 MG tablet Take 1 tablet by mouth once daily 90 tablet 0    rosuvastatin (CRESTOR) 40 MG tablet TAKE 1 TABLET BY MOUTH ONCE DAILY IN THE EVENING 90 tablet 0    blood glucose monitor kit and supplies Dispense one meter that insurance will cover.  1 kit 0    blood glucose monitor strips Test 3x daily E11.65 100 strip 3    Lancets MISC 1 each by Does not apply route daily 100 each 3    dextromethorphan-quiNIDine (NUEDEXTA) 20-10 MG CAPS per capsule Take 1 capsule by mouth in the morning and at bedtime 6 SAMPLES GIVEN:   LOT: [id number]  EXP: 01/23 60 capsule 78    insulin lispro (HUMALOG) 100 UNIT/ML SOLN injection vial INJECT SUBCUTANEOUSLY THREE TIMES DAILY- 24 UNITS BEFORE LUNCH, 24 UNITS MIDDAY SNACK, AND INCREASE TO 36 UNITS BEFORE DINNER 30 mL 3    Continuous Blood Gluc Sensor (FREESTYLE MURALI 2 SENSOR) MISC 1 Device by Does not apply route every 14 days 2 each 3    glucose 4 g chewable tablet Take 4 tablets by mouth as needed for Low blood sugar 60 tablet 3    blood glucose monitor kit and supplies Give 1 meter covered by insurance 1 kit 0    blood glucose monitor strips Test 2x daily 100 strip 3    Lancets MISC Pt test 2x daily 50 each 3    insulin glargine (LANTUS) 100 UNIT/ML injection vial INJECT 75 UNITS AT BEDTIME, 30 UNITS IN THE MORNING  .  E11.69 40 mL 3    Ostomy Supplies (SKIN TAC ADHESIVE BARRIER WIPE) MISC 1 packet by Does not apply route once a week 50 each 5    nystatin (MYCOSTATIN) 852471 UNIT/GM powder Apply topically 4 times daily. 1 each 3    Handicap Placard MISC by Does not apply route Good from 10/6/22 till 10/6/27 1 each 0    pantoprazole (PROTONIX) 40 MG tablet Take 1 tablet by mouth every morning (before breakfast) 90 tablet 3    ACCU-CHEK PHYLLIS PLUS strip Test 3x daily Dx E11.65 100 each 3    Continuous Blood Gluc  (FREESTYLE MURALI 2 READER) [place] 1 Device by Does not apply route 4 times daily (before meals and nightly) 1 each 0    fluocinonide (LIDEX) 0.05 % external solution Apply topically 2 times daily.  60 mL 0    dextromethorphan-quiNIDine (NUEDEXTA) 20-10 MG CAPS per capsule Take 1 capsule by mouth daily 30 capsule 3    nitrofurantoin (MACRODANTIN) 50 MG capsule One po daily 90 capsule 3    ferrous sulfate (IRON 325) 325 (65 Fe) MG tablet Take 1 tablet by mouth every other day 30 tablet 3    Glucagon (June Gang 2-PACK) 1 MG/0.2ML SOAJ Inject 1 mg into the skin every 15 minutes as needed (glucose less tahtn 70 or if symptomatic) 2 each 3    albuterol-ipratropium (COMBIVENT RESPIMAT)  MCG/ACT AERS inhaler Inhale 1 puff into the lungs every 6 hours as needed for Wheezing or Shortness of Breath 4 g 5    ipratropium-albuterol (DUONEB) 0.5-2.5 (3) MG/3ML SOLN nebulizer solution Inhale 3 mLs into the lungs 3 times daily 360 mL 0    Cranberry 500 MG CAPS Take 500 mg by mouth daily      Cholecalciferol (VITAMIN D3) 50 MCG (2000 UT) CAPS Take 1,000 Units by mouth 2 times daily       lidocaine-prilocaine (EMLA) 2.5-2.5 % cream APPLY CREAM TOPICALLY TO PORT SITE ONE HOUR PRIOR TO APPOINTMENT AS NEEDED      Accu-Chek Softclix Lancets MISC bid 100 each 3    Blood Glucose Monitoring Suppl (ACCU-CHEK PHYLLIS CONNECT) w/Device KIT 1 kit by Does not apply route daily 1 kit 0     No current facility-administered medications on file prior to visit. Review of Systems   Constitutional:  Positive for fatigue. Negative for activity change, appetite change and unexpected weight change. HENT:  Negative for congestion and trouble swallowing. Eyes:  Positive for visual disturbance (decreased vision- ongoing). Negative for pain and discharge. Double vision and eye pain in left eye. Respiratory:  Positive for shortness of breath (with exertion, chronic). Negative for cough, choking and wheezing. Cardiovascular:  Negative for chest pain, palpitations and leg swelling. Gastrointestinal:  Negative for abdominal pain, blood in stool, constipation, diarrhea, nausea and vomiting. Genitourinary: Negative. Musculoskeletal:  Positive for arthralgias. Negative for back pain, gait problem and myalgias. Skin:  Negative for color change, rash and wound. Neurological:  Positive for weakness and numbness (BUE/hands). Negative for dizziness, tremors, speech difficulty and light-headedness. Psychiatric/Behavioral:  Negative for confusion, dysphoric mood (intermittent crying spells- improving) and sleep disturbance. The patient is not nervous/anxious. Objective:   /62 (Site: Right Wrist)   Pulse 81   Temp 98.2 °F (36.8 °C)   Resp 18   LMP  (LMP Unknown)   SpO2 95%    Wt Readings from Last 3 Encounters:   01/11/23 155 lb (70.3 kg)   12/29/22 155 lb (70.3 kg)   12/29/22 150 lb (68 kg)     Physical Exam  Constitutional:       General: She is not in acute distress. HENT:      Head: Normocephalic and atraumatic. Nose: No rhinorrhea. Eyes:      General: No scleral icterus. Right eye: No discharge. Left eye: No discharge. Extraocular Movements: Extraocular movements intact. Conjunctiva/sclera: Conjunctivae normal.   Cardiovascular:      Rate and Rhythm: Normal rate and regular rhythm. Pulses: Normal pulses. Heart sounds: Murmur heard. Pulmonary:      Effort: Pulmonary effort is normal.      Breath sounds: Normal breath sounds. No wheezing or rhonchi. Abdominal:      General: Bowel sounds are normal. There is no distension. Palpations: Abdomen is soft. Tenderness: There is no abdominal tenderness. Musculoskeletal:      Cervical back: Normal range of motion and neck supple. Right lower leg: No edema. Left lower leg: No edema. Skin:     General: Skin is warm and dry. Neurological:      Mental Status: She is alert and oriented to person, place, and time. Psychiatric:         Attention and Perception: Attention normal.         Mood and Affect: Mood normal.         Behavior: Behavior normal.     Assessment and Plan:     1. Pain in both hands  2. Diabetic peripheral neuropathy (HCC)  Pain is not controlled. Patient would like to try gabapentin. Start at 100 mg po BID. Discussed side effects. 3. Chronic kidney disease stage III  Stable disease process. Avoid nephrotoxic agents. Monitoring as per PCP.     4. Chronic combined systolic and diastolic CHF (congestive heart failure) (HCC)  Stable. Monitor weight daily, call if you gain 3 lbs in one day or 5 lbs. in one week or for increased edema, sob, cough, orthopnea, or chest pain. Patient following with cardiology. 5. Chronic fatigue  6. Sleep disturbance  Remains stable. Fatigue is likely multifactorial in the setting of chronic disease/multiple comorbidities. Discussed hyperglycemia as well in contributing to fatigue. 7. Chronic obstructive pulmonary disease, unspecified COPD type ([facility]  Stable. Continue COPD directed therapies. Call for increased SOB, cough, sputum production, excessive fatigue, fever, chills, or myalgias. Activity as tolerated. 8. Pseudobulbar affect  9. Emotional lability  Controlled on Nuedexta. Managed by neurology. 10. Palliative care encounter  Discussed symptom management related to chronic disease/condition. Provided emotional support and active listening. Patient understands and is agreeable to current plan. Due to acuity, symptomatology and high-risk medication management, I advised patient to Return in about 2 months (around 4/17/2023).      MDM: JOHNATHON Brito - CNP    Collaborating physician: Dr. Gurvinder Leung

## 2023-02-21 ASSESSMENT — ENCOUNTER SYMPTOMS: EYE PAIN: 0

## 2023-02-22 RX ORDER — DEXTROMETHORPHAN HYDROBROMIDE AND QUINIDINE SULFATE 20; 10 MG/1; MG/1
1 CAPSULE, GELATIN COATED ORAL DAILY
Qty: 52 CAPSULE | Refills: 0 | COMMUNITY
Start: 2023-02-22

## 2023-02-23 ENCOUNTER — OFFICE VISIT (OUTPATIENT)
Dept: UROLOGY | Age: 78
End: 2023-02-23
Payer: MEDICARE

## 2023-02-23 VITALS
DIASTOLIC BLOOD PRESSURE: 66 MMHG | WEIGHT: 155 LBS | OXYGEN SATURATION: 98 % | HEART RATE: 92 BPM | BODY MASS INDEX: 31.25 KG/M2 | SYSTOLIC BLOOD PRESSURE: 128 MMHG | HEIGHT: 59 IN

## 2023-02-23 DIAGNOSIS — N39.0 CHRONIC UTI: ICD-10-CM

## 2023-02-23 DIAGNOSIS — N39.0 CHRONIC UTI: Primary | ICD-10-CM

## 2023-02-23 LAB
BILIRUBIN, POC: ABNORMAL
BLOOD URINE, POC: ABNORMAL
CLARITY, POC: ABNORMAL
COLOR, POC: YELLOW
GLUCOSE URINE, POC: ABNORMAL
KETONES, POC: ABNORMAL
LEUKOCYTE EST, POC: ABNORMAL
NITRITE, POC: ABNORMAL
PH, POC: 7
PROTEIN, POC: ABNORMAL
SPECIFIC GRAVITY, POC: 1.01
UROBILINOGEN, POC: 0.2

## 2023-02-23 PROCEDURE — G8417 CALC BMI ABV UP PARAM F/U: HCPCS | Performed by: UROLOGY

## 2023-02-23 PROCEDURE — 1123F ACP DISCUSS/DSCN MKR DOCD: CPT | Performed by: UROLOGY

## 2023-02-23 PROCEDURE — G8427 DOCREV CUR MEDS BY ELIG CLIN: HCPCS | Performed by: UROLOGY

## 2023-02-23 PROCEDURE — 99213 OFFICE O/P EST LOW 20 MIN: CPT | Performed by: UROLOGY

## 2023-02-23 PROCEDURE — 1090F PRES/ABSN URINE INCON ASSESS: CPT | Performed by: UROLOGY

## 2023-02-23 PROCEDURE — G8484 FLU IMMUNIZE NO ADMIN: HCPCS | Performed by: UROLOGY

## 2023-02-23 PROCEDURE — 81003 URINALYSIS AUTO W/O SCOPE: CPT | Performed by: UROLOGY

## 2023-02-23 PROCEDURE — 3074F SYST BP LT 130 MM HG: CPT | Performed by: UROLOGY

## 2023-02-23 PROCEDURE — 1036F TOBACCO NON-USER: CPT | Performed by: UROLOGY

## 2023-02-23 PROCEDURE — G8400 PT W/DXA NO RESULTS DOC: HCPCS | Performed by: UROLOGY

## 2023-02-23 PROCEDURE — 3078F DIAST BP <80 MM HG: CPT | Performed by: UROLOGY

## 2023-02-23 RX ORDER — NITROFURANTOIN MACROCRYSTALS 50 MG/1
CAPSULE ORAL
Qty: 90 CAPSULE | Refills: 3 | Status: SHIPPED | OUTPATIENT
Start: 2023-02-23

## 2023-02-23 NOTE — PROGRESS NOTES
[facility] EVALUATION NOTE                                                 H&P          Note:  Assessment and plan  69-year-old female with recurrent UTI secondary to poorly controlled diabetes causing hypotonic neuropathy of the bladder with poor emptying and stasis resulting in chronic colonization of bladder. Patient has done well with low-dose daily nitrofurantoin  No urine cultures have been submitted within the past 6 months  Patient denies UTIs and/or hematuria  Urine culture will be sent today  Patient denies any issues with breathing no new setbacks in breathing capacity that could be attributed to nitrofurantoin  We will continue with current regimen      The note below is complete evaluation of patient on follow-up/consultation                                                                                                                                                 Reason for Visit  Recurrent UTI secondary to diabetes    History of Present Illness  69-year-old female for blood sugar control  Last A1c was at 10  Continues to take nitrofurantoin without any side effects such as shortness of breath for chronic prophylaxis      Urologic Review of Systems/Symptoms  Denies UTIs over the past 6 months    Review of Systems  Hospitalization: None recent  All 14 categories of Review of Systems otherwise reviewed no other findings reported.   No change in review of systems  Past Medical History:   Diagnosis Date    Adenomatous polyp of sigmoid colon     Adenomatous polyp of transverse colon     Anxiety     Asthma     dx 2019 / has smoked since age 11    Brainstem stroke ([address]     Cardiopulmonary arrest ([address]     CHF (congestive heart failure) (HCC)     Chronic back pain     Bilateral L5 S1 Radic on emg--surprisingly worse on the left than the right--pt's symptoms and her MRI show worse on the right    Chronic obstructive pulmonary disease with acute exacerbation ([address] 10/12/2019    Depression     ESBL E. coli carrier     Carrier. Fibromyalgia     Gastrointestinal hemorrhage 2/24/2020    Hyperlipidemia     meds > 8 yrs    Hypertension     meds > 45 yrs    Insomnia 12/4/2013    On home O2     2l per n/c at bedtime mostly,     Osteoarthritis     Seizures ([facility]     Sepsis ([facility] 10/6/2020    Smoker 6/18/2013    Type II diabetes mellitus, uncontrolled     hx > 8 yrs    Unspecified sleep apnea      Past Surgical History:   Procedure Laterality Date    BACK SURGERY  2017    lumbar disc    CARDIAC CATHETERIZATION  11/03/2014    DR. MIRELES / no stents    COLONOSCOPY  08/29/2016    w/polypectomy     COLONOSCOPY N/A 09/29/2020    COLONOSCOPY WITH POLYPECTOMY performed by Tammi Goodrich MD at [address] N/A 10/07/2019    EUA HYSTEROSCOPY DILATATION AND CURETTAGE performed by Cosmo Mark DO at [address] [facility], COLON, DIAGNOSTIC      EYE SURGERY      [place] with IOL OU / [address] [address]    umbilical hernia repair    IR PORT PLACEMENT EQUAL OR GREATER THAN 5 YEARS  5/14/2021    IR PORT PLACEMENT EQUAL OR GREATER THAN 5 YEARS 5/14/2021 [facility] SPECIAL PROCEDURE    AR ESOPHAGOGASTRODUODENOSCOPY TRANSORAL DIAGNOSTIC N/A 03/24/2017    EGD ESOPHAGOGASTRODUODENOSCOPY performed by Lisandro Corral MD at [address]"" ABD PRTM&OMENTUM DX W/WO SPEC BR/WA SPX N/A 02/08/2018    negative findings    AR NEUROPLASTY &/TRANSPOS MEDIAN NRV CARPAL TUNNE Left 06/05/2017    LEFT  CARPAL TUNNEL RELEASE [facility] [address] [facility] UMB[facility]ICAL HRNA 5 YRS/> REDUCIBLE N/A 02/08/2018    TONSILLECTOMY      as child    TUNNELED VENOUS PORT PLACEMENT Right 05/14/2021    [address] [employer] Port ClearVUE by Dr. Margoth Teran  08/26/2016    w/bx     UPPER GASTROINTESTINAL ENDOSCOPY N/A 02/25/2020    EGD possible biopsy performed by Tammi Goodrich MD at [address] [place] [address]  2020    EGD PUSH ENTEROSCOPY performed by Moishe Seip, MD at [address] [facility] History    Marital status:      Spouse name: Edgar Montesinos    Number of children: 2    Years of education: 12    Highest education level: High school graduate   Occupational History    Occupation: Retired-   Tobacco Use    Smoking status: Former     Packs/day: 1.00     Years: 59.00     Pack years: 59.00     Types: Cigarettes     Start date: 2017     Quit date: 10/1/2020     Years since quittin.3    Smokeless tobacco: Never   Vaping Use    Vaping Use: Never used   Substance and Sexual Activity    Alcohol use: Not Currently    Drug use: No    Sexual activity: Yes     Partners: Male   Social History Narrative    Grew up in [address] [place] [address] With:  Edgar Montesinos Spouse    Type of Home: House    Home Layout: Two level, Performs ADL's on one level    Home Access: Stairs to enter with rails    Entrance Stairs - Number of Steps: 4    Bathroom Shower/Tub: Tub/Shower unit, Doors    Bathroom Equipment: Shower chair, Grab bars in [facility]: Rolling walker, Cane, Oxygen(uses her O2 very seldom)    ADL Assistance: Needs assistance    Homemaking Assistance: Needs assistance    Homemaking Responsibilities: No(spouse performs)    Ambulation Assistance: Independent    Transfer Assistance: Independent    Active : No    Occupation: Retired    Type of occupation: worked in [employer]: patient enjoys [employer]     Social Determinants of Health     Financial Resource Strain: Low Risk     Difficulty of Paying Living Expenses: Not hard at [employer] Insecurity: No Food Insecurity    Worried About [address] in the Last Year: Never true    [address] in the Last Year: Never true     Family History   Problem Relation Age of Onset    Heart Disease Father         cardiac bypass    Arthritis Father     Arthritis Mother     Other Mother          at age 80    Other Sister         ganesh [facility], 1928–1983    No Known Problems Daughter     Stroke Son      Current Outpatient Medications   Medication Sig Dispense Refill    dextromethorphan-quiNIDine (NUEDEXTA) 20-10 MG CAPS per capsule Take 1 capsule by mouth daily 4 SAMPLES GIVEN  LOT: [id number]  EXP: 08/25 52 capsule 0    gabapentin (NEURONTIN) 100 MG capsule Take 1 capsule by mouth 2 times daily for 30 days. Intended supply: 30 days 60 capsule 0    Continuous Blood Gluc  (FREESTYLE MURALI 2 READER) [place] Give 1 reader Dx E11.65 1 each 0    ketoconazole (NIZORAL) 2 % shampoo Apply 5 to 10 mL to wet scalp, lather, leave on 3 to 5 minutes, and rinse; apply twice weekly for 2 to 4 weeks. 120 mL 3    traMADol (ULTRAM) 50 MG tablet Take 0.5 tablets by mouth 2 times daily as needed. mometasone (ELOCON) 0.1 % cream As needed. Respiratory Therapy Supplies (NEBULIZER/TUBING/MOUTHPIECE) KIT 1 kit by Does not apply route as needed (SOB/wheezing) 1 kit 0    carvedilol (COREG) 25 MG tablet TAKE 1 & 1/2 (ONE & ONE-HALF) TABLETS BY MOUTH TWICE DAILY WITH MEALS 270 tablet 0    digoxin (LANOXIN) 125 MCG tablet TAKE 1 TABLET BY MOUTH EVERY OTHER DAY 45 tablet 0    amLODIPine (NORVASC) 5 MG tablet Take 1 tablet by mouth once daily 90 tablet 0    rosuvastatin (CRESTOR) 40 MG tablet TAKE 1 TABLET BY MOUTH ONCE DAILY IN THE EVENING 90 tablet 0    blood glucose monitor kit and supplies Dispense one meter that insurance will cover.  1 kit 0    blood glucose monitor strips Test 3x daily E11.65 100 strip 3    Lancets MISC 1 each by Does not apply route daily 100 each 3    dextromethorphan-quiNIDine (NUEDEXTA) 20-10 MG CAPS per capsule Take 1 capsule by mouth in the morning and at bedtime 6 SAMPLES GIVEN:   LOT: [id number]  EXP: 01/23 60 capsule 78    insulin lispro (HUMALOG) 100 UNIT/ML SOLN injection vial INJECT SUBCUTANEOUSLY THREE TIMES DAILY- 24 UNITS BEFORE LUNCH, 24 UNITS MIDDAY SNACK, AND INCREASE TO 36 UNITS BEFORE DINNER 30 mL 3    Continuous Blood Gluc Sensor (FREESTYLE MURALI 2 SENSOR) MISC 1 Device by Does not apply route every 14 days 2 each 3    glucose 4 g chewable tablet Take 4 tablets by mouth as needed for Low blood sugar 60 tablet 3    blood glucose monitor kit and supplies Give 1 meter covered by insurance 1 kit 0    blood glucose monitor strips Test 2x daily 100 strip 3    Lancets MISC Pt test 2x daily 50 each 3    insulin glargine (LANTUS) 100 UNIT/ML injection vial INJECT 75 UNITS AT BEDTIME, 30 UNITS IN THE MORNING  .  E11.69 40 mL 3    Ostomy Supplies (SKIN TAC ADHESIVE BARRIER WIPE) MISC 1 packet by Does not apply route once a week 50 each 5    nystatin (MYCOSTATIN) 585789 UNIT/GM powder Apply topically 4 times daily. 1 each 3    Handicap Placard MISC by Does not apply route Good from 10/6/22 till 10/6/27 1 each 0    pantoprazole (PROTONIX) 40 MG tablet Take 1 tablet by mouth every morning (before breakfast) 90 tablet 3    ACCU-CHEK PHYLLIS PLUS strip Test 3x daily Dx E11.65 100 each 3    Continuous Blood Gluc  (FREESTYLE MURALI 2 READER) [place] 1 Device by Does not apply route 4 times daily (before meals and nightly) 1 each 0    fluocinonide (LIDEX) 0.05 % external solution Apply topically 2 times daily.  60 mL 0    dextromethorphan-quiNIDine (NUEDEXTA) 20-10 MG CAPS per capsule Take 1 capsule by mouth daily 30 capsule 3    nitrofurantoin (MACRODANTIN) 50 MG capsule One po daily 90 capsule 3    ferrous sulfate (IRON 325) 325 (65 Fe) MG tablet Take 1 tablet by mouth every other day 30 tablet 3    Glucagon (GVOKE HYPOPEN 2-PACK) 1 MG/0.2ML SOAJ Inject 1 mg into the skin every 15 minutes as needed (glucose less tahtn 70 or if symptomatic) 2 each 3    albuterol-ipratropium (COMBIVENT RESPIMAT)  MCG/ACT AERS inhaler Inhale 1 puff into the lungs every 6 hours as needed for Wheezing or Shortness of Breath 4 g 5    ipratropium-albuterol (DUONEB) 0.5-2.5 (3) MG/3ML SOLN nebulizer solution Inhale 3 mLs into the lungs 3 times daily 360 mL 0    Cranberry 500 MG CAPS Take 500 mg by mouth daily      Cholecalciferol (VITAMIN D3) 50 MCG (2000 UT) CAPS Take 1,000 Units by mouth 2 times daily       lidocaine-prilocaine (EMLA) 2.5-2.5 % cream APPLY CREAM TOPICALLY TO PORT SITE ONE HOUR PRIOR TO APPOINTMENT AS NEEDED      Accu-Chek Softclix Lancets MISC bid 100 each 3    Blood Glucose Monitoring Suppl (ACCU-CHEK PHYLLIS CONNECT) w/Device KIT 1 kit by Does not apply route daily 1 kit 0     No current facility-administered medications for this visit. Ibuprofen, Metformin and related, and Darvon [propoxyphene hcl]  All reviewed and verified by Dr Geovanni Bender on today's visit    No results found for: PSA, PSADIA  Results for POC orders placed in visit on 02/23/23   POCT Urinalysis No Micro (Auto)   Result Value Ref Range    Color, UA yellow     Clarity, UA cloudy     Glucose, UA POC >=1000 mg/dL     Bilirubin, UA neg     Ketones, UA neg     Spec Grav, UA 1.015     Blood, UA POC moderate     pH, UA 7.0     Protein, UA POC 30 mg/dL     Urobilinogen, UA 0.2     Leukocytes, UA trace     Nitrite, UA neg        Physical Exam  Vitals:    02/23/23 1343   BP: 128/66   Pulse: 92   SpO2: 98%   Weight: 155 lb (70.3 kg)   Height: 4' 11\" (1.499 m)     Constitutional: Not in distress. Urologic Exam  Urinalysis nitrite negative  Additional findings  None  Remainder the physical exam is normal  Assessment/Medical Necessity-Decision Making  Chronic prophylaxis with low-dose nitrofurantoin for recurrent UTIs caused by poor blood sugar control and poor bladder emptying  Plan  Continue current regimen  Follow-up 1 year  Urine culture sent  Greater than 50% of 20 minutes spent consulting patient face-to-face  Orders Placed This Encounter   Procedures    Culture, Urine     Standing Status:   Future     Standing Expiration Date:   2/23/2024     Order Specific Question:   Specify (ex-cath, midstream, cysto, etc)?      Answer:   m    POCT Urinalysis No Micro (Auto) No orders of the defined types were placed in this encounter. Josef Monsivais MD       Please note this report has been partially produced using speech recognition software  And may cause contain errors related to that system including grammar, punctuation and spelling as well as words and phrases that may seem inappropriate. If there are questions or concerns please feel free to contact me to clarify.

## 2023-02-23 NOTE — PROGRESS NOTES
Chaperone for Intimate Exam    1. Was chaperone offered as part of the rooming process? offered, accepted   2. If Chaperone is declined by patient, NA: chaperone was available and exam completed  3.  Chaperone is Zoila CMA

## 2023-02-26 LAB
ORGANISM: ABNORMAL
URINE CULTURE, ROUTINE: ABNORMAL
URINE CULTURE, ROUTINE: ABNORMAL

## 2023-02-27 RX ORDER — SULFAMETHOXAZOLE AND TRIMETHOPRIM 800; 160 MG/1; MG/1
1 TABLET ORAL 2 TIMES DAILY
Qty: 20 TABLET | Refills: 0 | Status: SHIPPED | OUTPATIENT
Start: 2023-02-27 | End: 2023-03-09

## 2023-02-27 NOTE — RESULT ENCOUNTER NOTE
Patient is to take Bactrim twice a day for the next 10 days along with the nitrofurantoin low-dose and continue with the nitrofurantoin low-dose after the Bactrim is done  Patient already has a follow-up  Dr. Rika Galan

## 2023-02-28 ENCOUNTER — OFFICE VISIT (OUTPATIENT)
Dept: CARDIOLOGY CLINIC | Age: 78
End: 2023-02-28
Payer: MEDICARE

## 2023-02-28 VITALS — SYSTOLIC BLOOD PRESSURE: 122 MMHG | DIASTOLIC BLOOD PRESSURE: 68 MMHG | HEART RATE: 73 BPM | OXYGEN SATURATION: 96 %

## 2023-02-28 DIAGNOSIS — I10 ESSENTIAL HYPERTENSION: ICD-10-CM

## 2023-02-28 DIAGNOSIS — M79.642 BILATERAL HAND PAIN: ICD-10-CM

## 2023-02-28 DIAGNOSIS — I16.0 HYPERTENSIVE URGENCY: ICD-10-CM

## 2023-02-28 DIAGNOSIS — R20.0 BILATERAL HAND NUMBNESS: ICD-10-CM

## 2023-02-28 DIAGNOSIS — I25.10 CORONARY ARTERY DISEASE INVOLVING NATIVE CORONARY ARTERY OF NATIVE HEART WITHOUT ANGINA PECTORIS: ICD-10-CM

## 2023-02-28 DIAGNOSIS — M79.641 BILATERAL HAND PAIN: ICD-10-CM

## 2023-02-28 DIAGNOSIS — I50.41 ACUTE COMBINED SYSTOLIC AND DIASTOLIC CHF, NYHA CLASS 1 (HCC): Primary | ICD-10-CM

## 2023-02-28 DIAGNOSIS — R09.89 BILATERAL CAROTID BRUITS: ICD-10-CM

## 2023-02-28 DIAGNOSIS — E78.2 MIXED HYPERLIPIDEMIA: ICD-10-CM

## 2023-02-28 DIAGNOSIS — K92.2 GASTROINTESTINAL HEMORRHAGE, UNSPECIFIED GASTROINTESTINAL HEMORRHAGE TYPE: ICD-10-CM

## 2023-02-28 DIAGNOSIS — I42.1 HOCM (HYPERTROPHIC OBSTRUCTIVE CARDIOMYOPATHY) (HCC): ICD-10-CM

## 2023-02-28 DIAGNOSIS — N18.4 STAGE 4 CHRONIC KIDNEY DISEASE (HCC): ICD-10-CM

## 2023-02-28 PROCEDURE — G8484 FLU IMMUNIZE NO ADMIN: HCPCS | Performed by: INTERNAL MEDICINE

## 2023-02-28 PROCEDURE — 3078F DIAST BP <80 MM HG: CPT | Performed by: INTERNAL MEDICINE

## 2023-02-28 PROCEDURE — 1090F PRES/ABSN URINE INCON ASSESS: CPT | Performed by: INTERNAL MEDICINE

## 2023-02-28 PROCEDURE — G8428 CUR MEDS NOT DOCUMENT: HCPCS | Performed by: INTERNAL MEDICINE

## 2023-02-28 PROCEDURE — G8417 CALC BMI ABV UP PARAM F/U: HCPCS | Performed by: INTERNAL MEDICINE

## 2023-02-28 PROCEDURE — 99214 OFFICE O/P EST MOD 30 MIN: CPT | Performed by: INTERNAL MEDICINE

## 2023-02-28 PROCEDURE — 3074F SYST BP LT 130 MM HG: CPT | Performed by: INTERNAL MEDICINE

## 2023-02-28 PROCEDURE — G8400 PT W/DXA NO RESULTS DOC: HCPCS | Performed by: INTERNAL MEDICINE

## 2023-02-28 PROCEDURE — 1036F TOBACCO NON-USER: CPT | Performed by: INTERNAL MEDICINE

## 2023-02-28 PROCEDURE — 1123F ACP DISCUSS/DSCN MKR DOCD: CPT | Performed by: INTERNAL MEDICINE

## 2023-02-28 ASSESSMENT — ENCOUNTER SYMPTOMS
GASTROINTESTINAL NEGATIVE: 1
STRIDOR: 0
SHORTNESS OF BREATH: 0
BLOOD IN STOOL: 0
NAUSEA: 0
CHEST TIGHTNESS: 0
RESPIRATORY NEGATIVE: 1
EYES NEGATIVE: 1
COUGH: 0
WHEEZING: 0

## 2023-02-28 NOTE — PROGRESS NOTES
Subsequent Progress Note  Patient: Ingrid Gomez  YOB: 1945  MRN: [id number]    Chief Complaint: htn HF SOB DM HOCM  Chief Complaint   Patient presents with    Follow-up     4 month    Congestive Heart Failure       CV Data:  3/2019 Echo EF 79% no KYLE   10/8/2019 Cath CX 20-30 LVEF 95%   10/2020 Echo EF 55 Moderate CLVH  10/5/2020 Cath LAD 50-60   3/21 Echo EF 50 Severe LVH 1-2+ MR RVSP 31   8/39 LICA >79  Peak Velocity  233 m/s WU 50-69    Subjective/HPI: she stopped Verapamil 3 days ago due to severe fatigue and weakness. Feels better now. No cp no sob no bleed. C/o nocturnal leg cramps    3/27/2020 Patient and/or health care decision maker is aware that that he may receive a bill for this telephone service, depending on his insurance coverage, and has provided verbal consent to proceed. This visit was completed via telephone. Time spent on the phone with patient 18 minutes. She was to f/u with Dr. Elana Davidson but was on my VV schedule today. No CP no SOB. Still has occasional night time leg cramps. She is walking and compliant with meds. No LE edema. 1/15/21 recent multiple hospitalization. She was intubated and in shock. Had TVP for Bradycardia but stabilized and did not require PPM.      3/11/21 TELEHEALTH EVALUATION -- Audio/Visual (During [facility]- public health emergency)    No cp no sob no falls no bleed. Not walking much. Poor appetite.  /104. .. ran out of Verapamil and Clonidine. 3/18/21 Patient and/or health care decision maker is aware that that he/she may receive a bill for this telephone service, depending on his insurance coverage, and has provided verbal consent to proceed. This visit was completed via telephone. Total time 14 minutes. Had been doing well. This AM she took extra full dose Torsemde 50 mg because she thought she was not uriniating enough.  called in due to her BP being 79/56. She is awake and alert sitting.  No cp no sob. Just feels weak and tired     4/16/21 TELEHEALTH EVALUATION -- Audio/Visual (During [id number] public health emergency)    Recent resp failure intubated. No cp no sob no falls no bleed Bp stable. Her voice is hoarse from ET tube and swelling. She has no dysphagia    6/1/21 feels better. Recent hosp had intestinal bleed and taken off ASA>  She will have denat extraction o 6/26. She will stay off ASA for now. 8/6/21 legs weak. Walk little with lobo. No falls no bleed. Eeats well. Sleeps too much. Always Early AM BP elevated. 11/5/21 not very active having sob no falls no bleed. occ CP at night. occ dizzy    4/28/22 doing well no cp no sob no falls. No further bleed. Recent pontine cva. plavix was stopped and low dose Eliquis started. Pt cries often now since CVA. 7/26/22 both hands hurt no cp no sob no fall sno bleed. Take smeds. 10/26/22 Patient and/or health care decision maker is aware that that he/she may receive a bill for this telephone service, depending on his insurance coverage, and has provided verbal consent to proceed. This visit was completed via telephone. Total time 16 minutes. Hand hurt no change. No cp no sob no falls nobleed walks little. No neuro symptoms. 2/28/23 not very active. No cp no sob no falls no bleed no neuro isssues. EKG: SR67    Past Medical History:   Diagnosis Date    Adenomatous polyp of sigmoid colon     Adenomatous polyp of transverse colon     Anxiety     Asthma     dx 2019 / has smoked since age 11    Brainstem stroke ([address]     Cardiopulmonary arrest ([address]     CHF (congestive heart failure) (HCC)     Chronic back pain     Bilateral L5 S1 Radic on emg--surprisingly worse on the left than the right--pt's symptoms and her MRI show worse on the right    Chronic obstructive pulmonary disease with acute exacerbation ([address] 10/12/2019    Depression     ESBL E. coli carrier     Carrier.     Fibromyalgia     Gastrointestinal hemorrhage 2/24/2020 Hyperlipidemia     meds > 8 yrs    Hypertension     meds > 45 yrs    Insomnia 12/4/2013    On home O2     2l per n/c at bedtime mostly,     Osteoarthritis     Seizures [facility]     Sepsis ([facility] 10/6/2020    Smoker 6/18/2013    Type II diabetes mellitus, uncontrolled     hx > 8 yrs    Unspecified sleep apnea        Past Surgical History:   Procedure Laterality Date    BACK SURGERY  2017    lumbar disc    CARDIAC CATHETERIZATION  11/03/2014    DR. MIRELES / no stents    COLONOSCOPY  08/29/2016    w/polypectomy     COLONOSCOPY N/A 09/29/2020    COLONOSCOPY WITH POLYPECTOMY performed by Trini Nick MD at [address] N/A 10/07/2019    EUA HYSTEROSCOPY DILATATION AND CURETTAGE performed by Faina Chan DO at [address] [facility], COLON, DIAGNOSTIC      EYE SURGERY      Phaco with IOL OU / [address] [id number]    umbilical hernia repair    IR PORT PLACEMENT EQUAL OR GREATER THAN 5 YEARS  5/14/2021    IR PORT PLACEMENT EQUAL OR GREATER THAN 5 YEARS 5/14/2021 MLOZ SPECIAL PROCEDURE    [place] ESOPHAGOGASTRODUODENOSCOPY TRANSORAL DIAGNOSTIC N/A 03/24/2017    EGD ESOPHAGOGASTRODUODENOSCOPY performed by Mallory Núñez MD at [address] ABD PRTM&OMENTUM DX W/WO SPEC BR/WA SPX N/A 02/08/2018    negative findings    [place] NEUROPLASTY &/TRANSPOS MEDIAN NRV CARPAL TUNNE Left 06/05/2017    LEFT  CARPAL TUNNEL RELEASE performed [facility] Krish Tolentino MD [facility] [address] [facility] RPR UMBILICAL HRNA 5 YRS/> REDUCIBLE N/A 02/08/2018    TONSILLECTOMY      as child    TUNNELED VENOUS PORT PLACEMENT Right 05/14/2021    [employer] [address] [employer] Port ClearVUE by Dr. Laura Vega  08/26/2016    w/bx     UPPER GASTROINTESTINAL ENDOSCOPY N/A 02/25/2020    EGD possible biopsy performed by Trini Nick MD at [address] N/A 07/01/2020    EGD PUSH ENTEROSCOPY performed by Nicci Chna MD at [facility] Family History   Problem Relation Age of Onset    Heart Disease Father         cardiac bypass    Arthritis Father     Arthritis Mother     Other Mother          at age 80    Other Sister         ganesh [facility], [facility]    No Known Problems Daughter     Stroke Son        Social History     Socioeconomic History    Marital status:      Spouse name: Nelly Dallas    Number of children: 2    Years of education: 12    Highest education level: High school graduate   Occupational History    Occupation: Retired-   Tobacco Use    Smoking status: Former     Packs/day: 1.00     Years: 59.00     Pack years: 59.00     Types: Cigarettes     Start date: 2017     Quit date: 10/1/2020     Years since quittin.4    Smokeless tobacco: Never   Vaping Use    Vaping Use: Never used   Substance and Sexual Activity    Alcohol use: Not Currently    Drug use: No    Sexual activity: Yes     Partners: Male   Social History Narrative    Grew up in [address] [place] [address] With:  Nelly Dallas Spouse    Type of Home: House    Home Layout: Two level, Performs ADL's on one level    Home Access: Stairs to enter with rails    Entrance Stairs - Number of Steps: 4    Bathroom Shower/Tub: Tub/Shower unit, Doors    Bathroom Equipment: Shower chair, Grab bars in [facility]: Rolling walker, Cane, Oxygen(uses her O2 very seldom)    ADL Assistance: Needs assistance    Homemaking Assistance: Needs assistance    Homemaking Responsibilities: No(spouse performs)    Ambulation Assistance: Independent    Transfer Assistance: Independent    Active : No    Occupation: Retired    Type of occupation: worked in [employer]: patient enjoys [address] [facility] Strain: Low Risk     Difficulty of Paying Living Expenses: Not hard at [facility] Insecurity: No Food Insecurity    Worried About [address] in the Last Year: Never true    [address] in the Last Year: Never true       Allergies   Allergen Reactions    Ibuprofen Nausea Only    Metformin And Related      Diarrhea      Darvon [Propoxyphene Hcl] Nausea And Vomiting       Current Outpatient Medications   Medication Sig Dispense Refill    sulfamethoxazole-trimethoprim (BACTRIM DS;SEPTRA DS) 800-160 MG per tablet Take 1 tablet by mouth 2 times daily for 10 days 20 tablet 0    nitrofurantoin (MACRODANTIN) 50 MG capsule One po daily 90 capsule 3    dextromethorphan-quiNIDine (NUEDEXTA) 20-10 MG CAPS per capsule Take 1 capsule by mouth daily 4 SAMPLES GIVEN  LOT: [id number]  EXP: 08/25 52 capsule 0    gabapentin (NEURONTIN) 100 MG capsule Take 1 capsule by mouth 2 times daily for 30 days. Intended supply: 30 days 60 capsule 0    Continuous Blood Gluc  (FREESTYLE MURALI 2 READER) [place] Give 1 reader Dx E11.65 1 each 0    ketoconazole (NIZORAL) 2 % shampoo Apply 5 to 10 mL to wet scalp, lather, leave on 3 to 5 minutes, and rinse; apply twice weekly for 2 to 4 weeks. 120 mL 3    traMADol (ULTRAM) 50 MG tablet Take 0.5 tablets by mouth 2 times daily as needed. mometasone (ELOCON) 0.1 % cream As needed. Respiratory Therapy Supplies (NEBULIZER/TUBING/MOUTHPIECE) KIT 1 kit by Does not apply route as needed (SOB/wheezing) 1 kit 0    carvedilol (COREG) 25 MG tablet TAKE 1 & 1/2 (ONE & ONE-HALF) TABLETS BY MOUTH TWICE DAILY WITH MEALS 270 tablet 0    digoxin (LANOXIN) 125 MCG tablet TAKE 1 TABLET BY MOUTH EVERY OTHER DAY 45 tablet 0    amLODIPine (NORVASC) 5 MG tablet Take 1 tablet by mouth once daily 90 tablet 0    rosuvastatin (CRESTOR) 40 MG tablet TAKE 1 TABLET BY MOUTH ONCE DAILY IN THE EVENING 90 tablet 0    blood glucose monitor kit and supplies Dispense one meter that insurance will cover.  1 kit 0    blood glucose monitor strips Test 3x daily E11.65 100 strip 3    Lancets MISC 1 each by Does not apply route daily 100 each 3    dextromethorphan-quiNIDine (NUEDEXTA) 20-10 MG CAPS per capsule Take 1 capsule by mouth in the morning and at bedtime 6 SAMPLES GIVEN:   LOT: [id number]  EXP: 01/23 60 capsule 78    insulin lispro (HUMALOG) 100 UNIT/ML SOLN injection vial INJECT SUBCUTANEOUSLY THREE TIMES DAILY- 24 UNITS BEFORE LUNCH, 24 UNITS MIDDAY SNACK, AND INCREASE TO 36 UNITS BEFORE DINNER 30 mL 3    Continuous Blood Gluc Sensor (FREESTYLE MURALI 2 SENSOR) MISC 1 Device by Does not apply route every 14 days 2 each 3    glucose 4 g chewable tablet Take 4 tablets by mouth as needed for Low blood sugar 60 tablet 3    blood glucose monitor kit and supplies Give 1 meter covered by insurance 1 kit 0    blood glucose monitor strips Test 2x daily 100 strip 3    Lancets MISC Pt test 2x daily 50 each 3    insulin glargine (LANTUS) 100 UNIT/ML injection vial INJECT 75 UNITS AT BEDTIME, 30 UNITS IN THE MORNING  .  E11.69 40 mL 3    Ostomy Supplies (SKIN TAC ADHESIVE BARRIER WIPE) MISC 1 packet by Does not apply route once a week 50 each 5    nystatin (MYCOSTATIN) 143063 UNIT/GM powder Apply topically 4 times daily. 1 each 3    Handicap Placard MISC by Does not apply route Good from 10/6/22 till 10/6/27 1 each 0    pantoprazole (PROTONIX) 40 MG tablet Take 1 tablet by mouth every morning (before breakfast) 90 tablet 3    ACCU-CHEK PHYLLIS PLUS strip Test 3x daily Dx E11.65 100 each 3    Continuous Blood Gluc  (FREESTYLE MURALI 2 READER) [place] 1 Device by Does not apply route 4 times daily (before meals and nightly) 1 each 0    fluocinonide (LIDEX) 0.05 % external solution Apply topically 2 times daily.  60 mL 0    dextromethorphan-quiNIDine (NUEDEXTA) 20-10 MG CAPS per capsule Take 1 capsule by mouth daily 30 capsule 3    ferrous sulfate (IRON 325) 325 (65 Fe) MG tablet Take 1 tablet by mouth every other day 30 tablet 3    Glucagon (GVOKE HYPOPEN 2-PACK) 1 MG/0.2ML SOAJ Inject 1 mg into the skin every 15 minutes as needed (glucose less tahtn 70 or if symptomatic) 2 each 3    albuterol-ipratropium (COMBIVENT RESPIMAT)  MCG/ACT AERS inhaler Inhale 1 puff into the lungs every 6 hours as needed for Wheezing or Shortness of Breath 4 g 5    ipratropium-albuterol (DUONEB) 0.5-2.5 (3) MG/3ML SOLN nebulizer solution Inhale 3 mLs into the lungs 3 times daily 360 mL 0    Cranberry 500 MG CAPS Take 500 mg by mouth daily      Cholecalciferol (VITAMIN D3) 50 MCG (2000 UT) CAPS Take 1,000 Units by mouth 2 times daily       lidocaine-prilocaine (EMLA) 2.5-2.5 % cream APPLY CREAM TOPICALLY TO PORT SITE ONE HOUR PRIOR TO APPOINTMENT AS NEEDED      Accu-Chek Softclix Lancets MISC bid 100 each 3    Blood Glucose Monitoring Suppl (ACCU-CHEK PHYLLIS CONNECT) w/Device KIT 1 kit by Does not apply route daily 1 kit 0     No current facility-administered medications for this visit. Review of Systems:   Review of Systems   Constitutional: Negative. Negative for diaphoresis and fatigue. HENT: Negative. Eyes: Negative. Respiratory: Negative. Negative for cough, chest tightness, shortness of breath, wheezing and stridor. Cardiovascular: Negative. Negative for chest pain, palpitations and leg swelling. Gastrointestinal: Negative. Negative for blood in stool and nausea. Genitourinary: Negative. Musculoskeletal: Negative. Skin: Negative. Neurological: Negative. Negative for dizziness, syncope, weakness and light-headedness. Hematological: Negative. Psychiatric/Behavioral: Negative.          Physical Examination:    /68 (Site: Right Upper Arm, Position: Sitting, Cuff Size: Medium Adult)   Pulse 73   LMP  (LMP Unknown)   SpO2 96%    Physical Exam    LABS:  CBC:   Lab Results   Component Value Date/Time    WBC 5.8 12/29/2022 02:21 PM    RBC 4.38 12/29/2022 02:21 PM    HGB 12.2 12/29/2022 02:21 PM    HCT 38.4 12/29/2022 02:21 PM    MCV 87.5 12/29/2022 02:21 PM    MCH 27.9 12/29/2022 02:21 PM    MCHC 31.9 12/29/2022 02:21 PM    RDW 15.2 12/29/2022 02:21 PM     12/29/2022 02:21 PM    MPV 8.8 08/01/2015 09:33 AM     Lipids:  Lab Results   Component Value Date    CHOL 106 04/07/2022    CHOL 143 11/30/2020    CHOL 105 02/24/2020     Lab Results   Component Value Date    TRIG 183 (H) 04/07/2022    TRIG 64 11/30/2020    TRIG 67 02/24/2020     Lab Results   Component Value Date    HDL 33 (L) 04/07/2022    HDL 61 (H) 11/30/2020    HDL 42 02/24/2020     Lab Results   Component Value Date    LDLCALC 36 04/07/2022    LDLCALC 69 11/30/2020    LDLCALC 50 02/24/2020     No results found for: LABVLDL, VLDL  Lab Results   Component Value Date    CHOLHDLRATIO 3.4 09/11/2012     CMP:    Lab Results   Component Value Date/Time     12/29/2022 02:21 PM    K 4.5 12/29/2022 02:21 PM    K 4.1 05/11/2022 06:00 AM     12/29/2022 02:21 PM    CO2 21 12/29/2022 02:21 PM    BUN 28 12/29/2022 02:21 PM    CREATININE 1.72 12/29/2022 02:21 PM    GFRAA 31.1 05/18/2022 10:56 AM    LABGLOM 30.0 12/29/2022 02:21 PM    GLUCOSE 246 12/29/2022 02:21 PM    PROT 6.8 05/18/2022 10:56 AM    LABALBU 4.3 12/29/2022 02:21 PM    CALCIUM 10.3 12/29/2022 02:21 PM    BILITOT 0.3 05/18/2022 10:56 AM    ALKPHOS 115 05/18/2022 10:56 AM    AST 18 05/18/2022 10:56 AM    ALT 17 05/18/2022 10:56 AM     BMP:    Lab Results   Component Value Date/Time     12/29/2022 02:21 PM    K 4.5 12/29/2022 02:21 PM    K 4.1 05/11/2022 06:00 AM     12/29/2022 02:21 PM    CO2 21 12/29/2022 02:21 PM    BUN 28 12/29/2022 02:21 PM    LABALBU 4.3 12/29/2022 02:21 PM    CREATININE 1.72 12/29/2022 02:21 PM    CALCIUM 10.3 12/29/2022 02:21 PM    GFRAA 31.1 05/18/2022 10:56 AM    LABGLOM 30.0 12/29/2022 02:21 PM    GLUCOSE 246 12/29/2022 02:21 PM     Magnesium:    Lab Results   Component Value Date/Time    MG 2.3 04/06/2022 02:30 PM     TSH:  Lab Results   Component Value Date    TSH 0.474 11/30/2020       Patient Active Problem List   Diagnosis    Hypertension    Hyperlipidemia    Fibromyalgia    Anxiety    Depression    DJD (degenerative joint disease), lumbar    Lumbosacral radiculopathy at S1 Dysphonia    Bilateral carpal tunnel syndrome    High risk medication use-Norco - 12/20/17 OARRS PM&R, 02/20/18 O[facility] PM&R, 03/07/18 [facility] PM&R, Urine Drug screen negative 02/06/17 PM&R--MED CONTRACT 2/6/17    SOB (shortness of breath) on exertion    Memory deficit    Chronic UTI    Artificial lens present    Presbyopia    Astigmatism, regular    Cataract, nuclear sclerotic senile    Regular astigmatism    Nuclear senile cataract    Lumbosacral radiculopathy at L5    Spinal stenosis of lumbar region with neurogenic claudication    Impaired mobility and activities of daily living    Diabetic asymmetric polyneuropathy (HCC)    Myalgia    Acute combined systolic and diastolic CHF, NYHA class 1 ([address]    Uncontrolled type 2 diabetes mellitus with hyperglycemia ([address]    Chronic obstructive pulmonary disease (HCC)    HOCM (hypertrophic obstructive cardiomyopathy) ([facility])    Anemia    AVM (arteriovenous malformation)    Anemia of chronic renal failure    Dysarthria    Chronic fatigue    Major depressive disorder in remission ([address]    Gastroesophageal reflux disease without esophagitis    Acute cystitis with hematuria    Gait abnormality    Late effects of CVA (cerebrovascular accident)    Acute respiratory failure with hypoxia ([address]    Palliative care encounter    Bradycardia    Moderate hypoxic-ischemic encephalopathy    Infection due to ESBL-producing Escherichia coli    Port-A-Cath in place    Chronic kidney disease    History of GI bleed    Chronic right-sided thoracic back pain    Seizures ([facility])    Yeast infection    Stage 3b chronic kidney disease ([address]    Diabetic renal disease ([facility])    Hypervolemia    Chronic kidney disease, stage 4 (severe) ([address]    Other microscopic hematuria    Chronic combined systolic and diastolic CHF (congestive heart failure) ([facility])    Auditory hallucinations    Emotional lability    Hyperglycemia    Sleep disturbance    EFRAIN (internuclear ophthalmoplegia), right    History of CVA (cerebrovascular accident)    Symptomatic anemia    Claustrophobia    Ataxia    Pseudobulbar affect    Dysphagia    Fungal dermatitis    Diabetic autonomic neuropathy associated with type 2 diabetes mellitus (HCC)    Pain in both hands    Intractable headache    Ptosis of eyelid, left       There are no discontinued medications. Modified Medications    No medications on file       No orders of the defined types were placed in this encounter. Assessment/Plan:    1. Acute combined systolic and diastolic CHF, NYHA class 1 ([facility])   compensated   - Basic Metabolic Panel; Future    2. Essential hypertension   not controlled in AM-- will give Amlodipine 5 mg at [facility]. 3. Mixed hyperlipidemia - statin rx. Low fat diet f/u labs     4. HOCM (hypertrophic obstructive cardiomyopathy) ([facility])  Continue BB ACEI. And Dig.     5. Hypotension- stable now    6. Resp failure- stable. Need Therapy. Need to walk daily. Eat well. Take meds. 7. Intestinal Bleed- stay off ASA- if Hb stable will consider resume in future. 8. Recent CVA 4/7/22 - pontine- stable. 9. Carotid -  LICA > 70 by CUS but velocity is only 233. We will repeat CUS. 10. B/L Hand and wrist numbness and pain. - probable carpal tunnel - refer to ortho - stable      Walk more qd. Counseling:  Heart Healthy Lifestyle, Low Salt Diet, Take Precautions to Prevent Falls and Walk Daily    Return in about 4 months (around 6/28/2023).       Electronically signed by Eliecer Rodriguez MD on 2/28/2023 at 2:48 PM

## 2023-03-02 DIAGNOSIS — I50.41 ACUTE COMBINED SYSTOLIC AND DIASTOLIC CHF, NYHA CLASS 1 (HCC): ICD-10-CM

## 2023-03-02 DIAGNOSIS — E78.00 PURE HYPERCHOLESTEROLEMIA: ICD-10-CM

## 2023-03-02 RX ORDER — CARVEDILOL 25 MG/1
TABLET ORAL
Qty: 270 TABLET | Refills: 0 | Status: SHIPPED | OUTPATIENT
Start: 2023-03-02

## 2023-03-02 RX ORDER — AMLODIPINE BESYLATE 5 MG/1
TABLET ORAL
Qty: 90 TABLET | Refills: 0 | Status: SHIPPED | OUTPATIENT
Start: 2023-03-02

## 2023-03-02 RX ORDER — DIGOXIN 125 MCG
125 TABLET ORAL EVERY OTHER DAY
Qty: 45 TABLET | Refills: 0 | Status: SHIPPED | OUTPATIENT
Start: 2023-03-02

## 2023-03-02 NOTE — TELEPHONE ENCOUNTER
Please approve or deny this refill request. The order is pended. Thank you.     LOV 2/28/2023    Next Visit Date:  Future Appointments   Date Time Provider Zaid Lr   4/3/2023  2:30 PM [address]   4/11/2023  1:00 PM Meghan Bhatia MD [facility]   4/12/2023  2:45 PM MD Karen Aguilar [facility] -   4/19/2023  3:00 PM JOHNATHON Shane - CNP [facility]   7/11/2023  3:00 PM Jannet Babinski, MD [address] [place] [address]   2/23/2024  1:45 PM Devi Holcomb MD [facility]

## 2023-03-03 RX ORDER — ROSUVASTATIN CALCIUM 40 MG/1
TABLET, COATED ORAL
Qty: 90 TABLET | Refills: 0 | Status: SHIPPED | OUTPATIENT
Start: 2023-03-03

## 2023-03-03 NOTE — TELEPHONE ENCOUNTER
Comments: This request is coming from the pharmacy. Last Office Visit (last PCP visit):   2023    Next Visit Date:  Future Appointments   Date Time Provider Zaid Adeline   4/3/2023  2:30 PM Tu Carranza, [address]   2023  1:00 PM Chino Kent MD [facility]   2023  2:45 PM Mehul Aguirre MD [facility] -   2023  3:00 PM Omari Pineda APRN - CNP [facility]   2023  3:00 PM Leann Malik MD [address] [place] [address]   2024  1:45 PM Jessenia Muñoz MD [facility]       If hasn't been seen in over a year OR hasn't followed up according to last diabetes/ADHD visit, make appointment for patient before sending refill to provider.     Rx requested:  Requested Prescriptions     Pending Prescriptions Disp Refills    rosuvastatin (CRESTOR) 40 MG tablet [Pharmacy Med Name: Rosuvastatin Calcium 40 MG Oral Tablet] 90 tablet 0     Sig: TAKE 1 TABLET BY MOUTH ONCE DAILY IN THE EVENING

## 2023-03-17 ENCOUNTER — HOSPITAL ENCOUNTER (OUTPATIENT)
Dept: INFUSION THERAPY | Age: 78
Setting detail: INFUSION SERIES
Discharge: HOME | End: 2023-03-17

## 2023-03-23 ENCOUNTER — HOSPITAL ENCOUNTER (OUTPATIENT)
Dept: INFUSION THERAPY | Age: 78
Setting detail: INFUSION SERIES
Discharge: HOME | End: 2023-03-23

## 2023-03-29 ENCOUNTER — HOSPITAL ENCOUNTER (EMERGENCY)
Age: 78
Discharge: HOME | End: 2023-03-29
Attending: STUDENT IN AN ORGANIZED HEALTH CARE EDUCATION/TRAINING PROGRAM
Payer: MEDICARE

## 2023-03-29 ENCOUNTER — NURSE TRIAGE (OUTPATIENT)
Dept: OTHER | Facility: CLINIC | Age: 78
End: 2023-03-29

## 2023-03-29 ENCOUNTER — APPOINTMENT (OUTPATIENT)
Dept: GENERAL RADIOLOGY | Age: 78
End: 2023-03-29
Payer: MEDICARE

## 2023-03-29 VITALS
RESPIRATION RATE: 18 BRPM | HEART RATE: 78 BPM | HEIGHT: 59 IN | DIASTOLIC BLOOD PRESSURE: 78 MMHG | BODY MASS INDEX: 31.45 KG/M2 | WEIGHT: 156 LBS | OXYGEN SATURATION: 99 % | TEMPERATURE: 98.2 F | SYSTOLIC BLOOD PRESSURE: 135 MMHG

## 2023-03-29 DIAGNOSIS — R07.89 CHEST WALL PAIN: Primary | ICD-10-CM

## 2023-03-29 DIAGNOSIS — M79.602 LEFT ARM PAIN: ICD-10-CM

## 2023-03-29 LAB
ALBUMIN SERPL-MCNC: 4 G/DL (ref 3.5–4.6)
ALP SERPL-CCNC: 114 U/L (ref 40–130)
ALT SERPL-CCNC: 18 U/L (ref 0–33)
ANION GAP SERPL CALCULATED.3IONS-SCNC: 11 MEQ/L (ref 9–15)
APTT PPP: 31.8 SEC (ref 24.4–36.8)
AST SERPL-CCNC: 20 U/L (ref 0–35)
BASOPHILS # BLD: 0.1 K/UL (ref 0–0.2)
BASOPHILS NFR BLD: 1.1 %
BILIRUB SERPL-MCNC: <0.2 MG/DL (ref 0.2–0.7)
BUN SERPL-MCNC: 28 MG/DL (ref 8–23)
CALCIUM SERPL-MCNC: 9.9 MG/DL (ref 8.5–9.9)
CHLORIDE SERPL-SCNC: 104 MEQ/L (ref 95–107)
CO2 SERPL-SCNC: 24 MEQ/L (ref 20–31)
CREAT SERPL-MCNC: 1.93 MG/DL (ref 0.5–0.9)
EOSINOPHIL # BLD: 0.2 K/UL (ref 0–0.7)
EOSINOPHIL NFR BLD: 3.6 %
ERYTHROCYTE [DISTWIDTH] IN BLOOD BY AUTOMATED COUNT: 14.8 % (ref 11.5–14.5)
GLOBULIN SER CALC-MCNC: 2.6 G/DL (ref 2.3–3.5)
GLUCOSE SERPL-MCNC: 380 MG/DL (ref 70–99)
HCT VFR BLD AUTO: 36.2 % (ref 37–47)
HGB BLD-MCNC: 11.7 G/DL (ref 12–16)
INR PPP: 1
LYMPHOCYTES # BLD: 1 K/UL (ref 1–4.8)
LYMPHOCYTES NFR BLD: 16.2 %
MCH RBC QN AUTO: 28.7 PG (ref 27–31.3)
MCHC RBC AUTO-ENTMCNC: 32.3 % (ref 33–37)
MCV RBC AUTO: 88.8 FL (ref 79.4–94.8)
MONOCYTES # BLD: 0.4 K/UL (ref 0.2–0.8)
MONOCYTES NFR BLD: 6.1 %
NEUTROPHILS # BLD: 4.3 K/UL (ref 1.4–6.5)
NEUTS SEG NFR BLD: 73 %
PLATELET # BLD AUTO: 281 K/UL (ref 130–400)
POTASSIUM SERPL-SCNC: 5.2 MEQ/L (ref 3.4–4.9)
PROT SERPL-MCNC: 6.6 G/DL (ref 6.3–8)
PROTHROMBIN TIME: 13.5 SEC (ref 12.3–14.9)
RBC # BLD AUTO: 4.08 M/UL (ref 4.2–5.4)
SODIUM SERPL-SCNC: 139 MEQ/L (ref 135–144)
TROPONIN T SERPL-MCNC: 0.06 NG/ML (ref 0–0.01)
WBC # BLD AUTO: 5.9 K/UL (ref 4.8–10.8)

## 2023-03-29 PROCEDURE — 6370000000 HC RX 637 (ALT 250 FOR IP): Performed by: STUDENT IN AN ORGANIZED HEALTH CARE EDUCATION/TRAINING PROGRAM

## 2023-03-29 PROCEDURE — 85025 COMPLETE CBC W/AUTO DIFF WBC: CPT

## 2023-03-29 PROCEDURE — 71046 X-RAY EXAM CHEST 2 VIEWS: CPT

## 2023-03-29 PROCEDURE — 36415 COLL VENOUS BLD VENIPUNCTURE: CPT

## 2023-03-29 PROCEDURE — 85730 THROMBOPLASTIN TIME PARTIAL: CPT

## 2023-03-29 PROCEDURE — 85610 PROTHROMBIN TIME: CPT

## 2023-03-29 PROCEDURE — 99285 EMERGENCY DEPT VISIT HI MDM: CPT

## 2023-03-29 PROCEDURE — 93005 ELECTROCARDIOGRAM TRACING: CPT

## 2023-03-29 PROCEDURE — 80053 COMPREHEN METABOLIC PANEL: CPT

## 2023-03-29 PROCEDURE — 84484 ASSAY OF TROPONIN QUANT: CPT

## 2023-03-29 RX ORDER — 0.9 % SODIUM CHLORIDE 0.9 %
1000 INTRAVENOUS SOLUTION INTRAVENOUS ONCE
Status: DISCONTINUED | OUTPATIENT
Start: 2023-03-29 | End: 2023-03-29 | Stop reason: HOSPADM

## 2023-03-29 RX ORDER — METHOCARBAMOL 500 MG/1
1500 TABLET, FILM COATED ORAL ONCE
Status: COMPLETED | OUTPATIENT
Start: 2023-03-29 | End: 2023-03-29

## 2023-03-29 RX ORDER — LIDOCAINE AND PRILOCAINE 25; 25 MG/G; MG/G
CREAM TOPICAL
Qty: 5 G | Refills: 0 | Status: SHIPPED | OUTPATIENT
Start: 2023-03-29

## 2023-03-29 RX ORDER — ACETAMINOPHEN 500 MG
1000 TABLET ORAL ONCE
Status: DISCONTINUED | OUTPATIENT
Start: 2023-03-29 | End: 2023-03-29 | Stop reason: HOSPADM

## 2023-03-29 RX ORDER — ACETAMINOPHEN 500 MG
1000 TABLET ORAL EVERY 6 HOURS PRN
Qty: 60 TABLET | Refills: 0 | Status: SHIPPED | OUTPATIENT
Start: 2023-03-29

## 2023-03-29 RX ORDER — OXYCODONE HYDROCHLORIDE 5 MG/1
5 TABLET ORAL ONCE
Status: COMPLETED | OUTPATIENT
Start: 2023-03-29 | End: 2023-03-29

## 2023-03-29 RX ORDER — METHOCARBAMOL 500 MG/1
1000 TABLET, FILM COATED ORAL 4 TIMES DAILY PRN
Qty: 40 TABLET | Refills: 0 | Status: SHIPPED | OUTPATIENT
Start: 2023-03-29 | End: 2023-04-05

## 2023-03-29 RX ADMIN — OXYCODONE HYDROCHLORIDE 5 MG: 5 TABLET ORAL at 14:15

## 2023-03-29 RX ADMIN — METHOCARBAMOL 1500 MG: 500 TABLET ORAL at 14:16

## 2023-03-29 ASSESSMENT — PAIN DESCRIPTION - DESCRIPTORS: DESCRIPTORS: ACHING;DULL

## 2023-03-29 ASSESSMENT — PAIN DESCRIPTION - LOCATION
LOCATION: CHEST;ARM
LOCATION: ARM

## 2023-03-29 ASSESSMENT — PAIN DESCRIPTION - FREQUENCY: FREQUENCY: CONTINUOUS

## 2023-03-29 ASSESSMENT — PAIN SCALES - GENERAL
PAINLEVEL_OUTOF10: 8
PAINLEVEL_OUTOF10: 8

## 2023-03-29 ASSESSMENT — PAIN DESCRIPTION - ORIENTATION: ORIENTATION: LEFT

## 2023-03-29 ASSESSMENT — PAIN DESCRIPTION - ONSET: ONSET: ON-GOING

## 2023-03-29 NOTE — TELEPHONE ENCOUNTER
Location of patient: [place]    Received call from [facility] with [employer]. Subjective: Caller states Modesto Romero is having  pain above her left breast and pain in her left armpit. When I press above her left breast I feel a lump \"     Current Symptoms:     Onset: 3 days ago; worsening    Associated Symptoms:     Pain Severity: 9/10; dull, aching; constant    Temperature:      What has been tried: heat    LMP: NA Pregnant: NA    Recommended disposition: Call  Now    Care advice provided, patient verbalizes understanding; denies any other questions or concerns; instructed to call back for any new or worsening symptoms. Patient/caller agrees to calling 911    Attention Provider: Thank you for allowing me to participate in the care of your patient. The patient was connected to triage in response to information provided to the ECC/PSC. Please do not respond through this encounter as the response is not directed to a shared pool.       Reason for Disposition   Chest pain lasting longer than 5 minutes and ANY of the following:* Over 40years old* Over 27years old and at least one cardiac risk factor (e.g., diabetes mellitus, high blood pressure, high cholesterol, smoker, or strong family history of heart disease)* History of heart disease (i.e., angina, heart attack, heart failure, bypass surgery, takes nitroglycerin)* Pain is crushing, pressure-like, or heavy    Protocols used: Chest Pain-ADULT-[place]

## 2023-03-29 NOTE — ED NOTES
Pt complaining of left sided chest pain and left arm pain. Pt states pain started when she placed her implant for her diabetes in her arm. Pt states has switched the implant to other arm and is still having pain in left arm.      Gertrude Nielsen  03/29/23 115

## 2023-03-29 NOTE — ED PROVIDER NOTES
[facility] ED  eMERGENCY dEPARTMENT eNCOUnter      Pt Name: Valorie Barreto  MRN: [id number]  Alvaradogfkristen 1945  Date of evaluation: 3/29/2023  Provider: Frank Francisco MD      HISTORY OF PRESENT ILLNESS      Chief Complaint   Patient presents with    Chest Pain    Arm Pain     left       The history is provided by the {.REYNALDOORIANS:92279::\"Patient\"}. Valorie Barreto is a 65 y.o. female with a PMH clinically significant for {. NAPMH:08185}, {. NAPSH:83837} presenting to the ED via {. IINJ:27945} c/o ***. Denies any associated: {.NAASSOC:05110}. Precipitating Factors: {. NAPRECIP:45086:a}. Denies preceding {. NAPRECIP:72260}. Worsening Factors: {. EXACERBATINGFACTORS:89578}. Denies worsening with {.EXACERBATINGFACTORS:74506:o}. Alleviating Factors: {.ALLEVIATINGFACTORS:15585}. Denies any alleviation with {.ALLEVIATINGFACTORS:87924:o}. States ***history of similar previous episodes. States they have otherwise been feeling well***. Taking all medications as indicated: {Response; yes/no:64}. Per Chart Review: PMH as noted above obtained via outpatient chart review. ***      REVIEW OF SYSTEMS       Review of Systems      PAST MEDICAL HISTORY     Past Medical History:   Diagnosis Date    Adenomatous polyp of sigmoid colon     Adenomatous polyp of transverse colon     Anxiety     Asthma     dx 2019 / has smoked since age 11    Brainstem stroke ([address]     Cardiopulmonary arrest ([address]     CHF (congestive heart failure) (HCC)     Chronic back pain     Bilateral L5 S1 Radic on emg--surprisingly worse on the left than the right--pt's symptoms and her MRI show worse on the right    Chronic obstructive pulmonary disease with acute exacerbation ([address] 10/12/2019    Depression     ESBL E. coli carrier     Carrier.     Fibromyalgia     Gastrointestinal hemorrhage 2/24/2020    Hyperlipidemia     meds > 8 yrs    Hypertension     meds > 45 yrs    Insomnia 12/4/2013    On home O2     2l per n/c at bedtime mostly,     Osteoarthritis     Se[facility]     Sepsis ([facility] 10/6/2020    Smoker 6/18/2013    Type II diabetes mellitus, uncontrolled     hx > 8 yrs    Unspecified sleep apnea        SURGICAL HISTORY       Past Surgical History:   Procedure Laterality Date    BACK SURGERY  2017    lumbar disc    CARDIAC CATHETERIZATION  11/03/2014    DR. MIRELES / no stents    COLONOSCOPY  08/29/2016    w/polypectomy     COLONOSCOPY N/A 09/29/2020    COLONOSCOPY WITH POLYPECTOMY performed by Ju Shook MD at [facility] N/A 10/07/2019    EUA HYSTEROSCOPY DILATATION AND CURETTAGE performed by Luli Odom DO at [address] [place] [address] ENDOSCOPY, COLON, DIAGNOSTIC      EYE SURGERY      [place] with IOL OU / [address]    umbilical hernia repair    IR PORT PLACEMENT EQUAL OR GREATER THAN 5 YEARS  5/14/2021    IR PORT PLACEMENT EQUAL OR GREATER THAN 5 YEARS 5/14/2021 [facility] SPECIAL PROCEDURE    [place] ESOPHAGOGASTRODUODENOSCOPY TRANSORAL DIAGNOSTIC N/A 03/24/2017    EGD ESOPHAGOGASTRODUODENOSCOPY performed by Azalia Halsted, MD at [facility] ABD PRTM&OMENTUM DX W/WO SPEC BR/WA SPX N/A 02/08/2018    negative findings    [place] NEUROPLASTY &/TRANSPOS MEDIAN NRV CARPAL TUNNE Left 06/05/2017    LEFT  CARPAL TUNNEL RELEASE performed by Raguel Sandhoff, MD at [facility] 5 YRS/> REDUCIBLE N/A 02/08/2018    TONSILLECTOMY      as child    TUNNELED VENOUS PORT PLACEMENT Right 05/14/2021    [employer] Port ClearVUE by Dr. Prema Jeronimo  08/26/2016    w/bx     UPPER GASTROINTESTINAL ENDOSCOPY N/A 02/25/2020    EGD possible biopsy performed by Ju Shook MD at [facility] N/A 07/01/2020    EGD PUSH ENTEROSCOPY performed by Goldie Bell MD at [facility]       Family History   Problem Relation Age

## 2023-03-30 ENCOUNTER — HOSPITAL ENCOUNTER (OUTPATIENT)
Dept: INFUSION THERAPY | Age: 78
Setting detail: INFUSION SERIES
Discharge: HOME | End: 2023-03-30
Payer: MEDICARE

## 2023-03-30 VITALS — HEART RATE: 76 BPM | TEMPERATURE: 98.3 F | DIASTOLIC BLOOD PRESSURE: 60 MMHG | SYSTOLIC BLOOD PRESSURE: 139 MMHG

## 2023-03-30 DIAGNOSIS — Z95.828 PORT-A-CATH IN PLACE: Primary | ICD-10-CM

## 2023-03-30 PROCEDURE — 2580000003 HC RX 258: Performed by: NURSE PRACTITIONER

## 2023-03-30 PROCEDURE — 96523 IRRIG DRUG DELIVERY DEVICE: CPT

## 2023-03-30 PROCEDURE — 6360000002 HC RX W HCPCS: Performed by: NURSE PRACTITIONER

## 2023-03-30 RX ORDER — WATER 1000 ML/1000ML
2.2 INJECTION, SOLUTION INTRAVENOUS ONCE
OUTPATIENT
Start: 2023-04-27 | End: 2023-04-27

## 2023-03-30 RX ORDER — HEPARIN SODIUM (PORCINE) LOCK FLUSH IV SOLN 100 UNIT/ML 100 UNIT/ML
500 SOLUTION INTRAVENOUS PRN
Status: DISCONTINUED | OUTPATIENT
Start: 2023-03-30 | End: 2023-03-31 | Stop reason: HOSPADM

## 2023-03-30 RX ORDER — SODIUM CHLORIDE 0.9 % (FLUSH) 0.9 %
5-40 SYRINGE (ML) INJECTION PRN
OUTPATIENT
Start: 2023-04-27

## 2023-03-30 RX ORDER — SODIUM CHLORIDE 0.9 % (FLUSH) 0.9 %
5-40 SYRINGE (ML) INJECTION PRN
Status: DISCONTINUED | OUTPATIENT
Start: 2023-03-30 | End: 2023-03-31 | Stop reason: HOSPADM

## 2023-03-30 RX ORDER — HEPARIN SODIUM (PORCINE) LOCK FLUSH IV SOLN 100 UNIT/ML 100 UNIT/ML
500 SOLUTION INTRAVENOUS PRN
OUTPATIENT
Start: 2023-04-27

## 2023-03-30 RX ADMIN — HEPARIN 500 UNITS: 100 SYRINGE at 14:44

## 2023-03-30 RX ADMIN — SODIUM CHLORIDE, PRESERVATIVE FREE 20 ML: 5 INJECTION INTRAVENOUS at 14:43

## 2023-03-30 NOTE — FLOWSHEET NOTE
Patient to the floor via wheelchair for her port flush. Vital signs taken. Denies any discomfort. Family at side.

## 2023-03-30 NOTE — FLOWSHEET NOTE
Port flush is complete. Tolerated well. Left the unit via wheelchair with her . All equipment used in the care for this patient has been cleaned.

## 2023-04-02 LAB
EKG ATRIAL RATE: 76 BPM
EKG P AXIS: 46 DEGREES
EKG P-R INTERVAL: 156 MS
EKG Q-T INTERVAL: 372 MS
EKG QRS DURATION: 84 MS
EKG QTC CALCULATION (BAZETT): 418 MS
EKG R AXIS: 31 DEGREES
EKG T AXIS: 183 DEGREES
EKG VENTRICULAR RATE: 76 BPM

## 2023-04-03 ENCOUNTER — TELEMEDICINE (OUTPATIENT)
Dept: ENDOCRINOLOGY | Age: 78
End: 2023-04-03
Payer: MEDICARE

## 2023-04-03 DIAGNOSIS — E11.69 TYPE 2 DIABETES MELLITUS WITH OTHER SPECIFIED COMPLICATION, WITH LONG-TERM CURRENT USE OF INSULIN (HCC): ICD-10-CM

## 2023-04-03 DIAGNOSIS — E11.65 UNCONTROLLED TYPE 2 DIABETES MELLITUS WITH HYPERGLYCEMIA (HCC): ICD-10-CM

## 2023-04-03 DIAGNOSIS — Z79.4 TYPE 2 DIABETES MELLITUS WITH OTHER SPECIFIED COMPLICATION, WITH LONG-TERM CURRENT USE OF INSULIN (HCC): ICD-10-CM

## 2023-04-03 PROCEDURE — 99442 PR PHYS/QHP TELEPHONE EVALUATION 11-20 MIN: CPT | Performed by: PHYSICIAN ASSISTANT

## 2023-04-03 RX ORDER — INSULIN LISPRO 100 [IU]/ML
INJECTION, SOLUTION INTRAVENOUS; SUBCUTANEOUS
Qty: 30 ML | Refills: 3 | Status: SHIPPED | OUTPATIENT
Start: 2023-04-03

## 2023-04-03 RX ORDER — INSULIN GLARGINE 100 [IU]/ML
INJECTION, SOLUTION SUBCUTANEOUS
Qty: 40 ML | Refills: 3 | Status: SHIPPED | OUTPATIENT
Start: 2023-04-03

## 2023-04-03 ASSESSMENT — ENCOUNTER SYMPTOMS
EYE PAIN: 0
DIARRHEA: 0
EYE REDNESS: 0
ABDOMINAL PAIN: 0
RHINORRHEA: 0
SORE THROAT: 0
WHEEZING: 0
SINUS PRESSURE: 0
VOMITING: 0
SHORTNESS OF BREATH: 0
NAUSEA: 0
COUGH: 0

## 2023-04-03 NOTE — PROGRESS NOTES
0    rosuvastatin (CRESTOR) 40 MG tablet, TAKE 1 TABLET BY MOUTH ONCE DAILY IN THE EVENING, Disp: 90 tablet, Rfl: 0    digoxin (LANOXIN) 125 MCG tablet, TAKE 1 TABLET BY MOUTH EVERY OTHER DAY, Disp: 45 tablet, Rfl: 0    carvedilol (COREG) 25 MG tablet, TAKE 1 & 1/2 (ONE & ONE-HALF) TABLETS BY MOUTH TWICE DAILY WITH MEALS, Disp: 270 tablet, Rfl: 0    amLODIPine (NORVASC) 5 MG tablet, Take 1 tablet by mouth once daily, Disp: 90 tablet, Rfl: 0    nitrofurantoin (MACRODANTIN) 50 MG capsule, One po daily, Disp: 90 capsule, Rfl: 3    dextromethorphan-quiNIDine (NUEDEXTA) 20-10 MG CAPS per capsule, Take 1 capsule by mouth daily 4 SAMPLES GIVEN LOT: [id number] EXP: 08/25, Disp: 52 capsule, Rfl: 0    gabapentin (NEURONTIN) 100 MG capsule, Take 1 capsule by mouth 2 times daily for 30 days. Intended supply: 30 days, Disp: 60 capsule, Rfl: 0    Continuous Blood Gluc  (FREESTYLE MURALI 2 READER) [place], Give 1 reader Dx E11.65, Disp: 1 each, Rfl: 0    ketoconazole (NIZORAL) 2 % shampoo, Apply 5 to 10 mL to wet scalp, lather, leave on 3 to 5 minutes, and rinse; apply twice weekly for 2 to 4 weeks. , Disp: 120 mL, Rfl: 3    traMADol (ULTRAM) 50 MG tablet, Take 0.5 tablets by mouth 2 times daily as needed. , Disp: , Rfl:     mometasone (ELOCON) 0.1 % cream, As needed. , Disp: , Rfl:     Respiratory Therapy Supplies (NEBULIZER/TUBING/MOUTHPIECE) KIT, 1 kit by Does not apply route as needed (SOB/wheezing), Disp: 1 kit, Rfl: 0    blood glucose monitor kit and supplies, Dispense one meter that insurance will cover. , Disp: 1 kit, Rfl: 0    blood glucose monitor strips, Test 3x daily E11.65, Disp: 100 strip, Rfl: 3    Lancets MISC, 1 each by Does not apply route daily, Disp: 100 each, Rfl: 3    dextromethorphan-quiNIDine (NUEDEXTA) 20-10 MG CAPS per capsule, Take 1 capsule by mouth in the morning and at bedtime 6 SAMPLES GIVEN:  LOT: [id number] EXP: 01/23, Disp: 60 capsule, Rfl: 78    insulin lispro (HUMALOG) 100 UNIT/ML SOLN injection vial,

## 2023-04-05 ENCOUNTER — CARE COORDINATION (OUTPATIENT)
Dept: CARE COORDINATION | Age: 78
End: 2023-04-05

## 2023-04-05 NOTE — CARE COORDINATION
ACM called patient. Left message requesting a return call to introduce [facility] program.  Contact information supplied. Letter sent via [address] mail and to [facility].

## 2023-04-05 NOTE — LETTER
4/5/2023    [place] Singer [id number]      Dear Jo Scales,    My name is Cori Betts RN and I am a registered nurse who partners with Marshall Stokes MD to improve patients' health. Marshall Stokes MD believes you would benefit from working with me. As a member of your health care team, I would work with other providers involved in your care, offer education for your specific health conditions, and connect you with additional resources as needed. I will collaborate with Marshall Stokes MD to support you in following your treatment plan. The additional support I provide is no additional cost to you. My primary focus is to help you achieve specific goals and improve your health. We are committed to walk with you on this journey and look forward to working with you. Please call me to further discuss your healthcare needs. I am available by phone or for appointments at the office. You can reach me at [phone].     In good health,     Cori Betts RN

## 2023-04-07 PROBLEM — H49.02 LEFT OCULOMOTOR NERVE PALSY: Status: ACTIVE | Noted: 2023-04-07

## 2023-04-07 PROBLEM — I63.9 BRAINSTEM STROKE (HCC): Status: ACTIVE | Noted: 2023-04-07

## 2023-04-14 NOTE — ADT AUTH CERT
Utilization Reviews       Ventricular Arrhythmias - Care Day 7 (3/24/2021) by Vika Cash RN       Review Status Review Entered   Completed 3/24/2021 14:54      Criteria Review      Care Day: 7 Care Date: 3/24/2021 Level of Care:    Guideline Day 2    Level Of Care    ( ) * Intermediate care or telemetry    3/24/2021 2:54 PM EDT by Isaura Caputo      ICU    Clinical Status    ( ) * Hemodynamic stability    3/24/2021 2:54 PM EDT by Isaura Caputo      137/44, 110, 99.7, 25, 100% on 35% FiO2/vent    (X) * No evidence of myocardial ischemia    Routes    (X) Possible IV medication    3/24/2021 2:54 PM EDT by Isaura Caputo      Merrem 1,000mg IV Q 12 hrs - 1 dose given - d/c'd, Solu-medrol 40m mg IV daily, Lopressor 5 mg IV Q 6 hrs, protonix 40 mg IV daily, propofol gtt, hydralazine 10 mg IV X 1, latetalol 20 mg IV X 1    Interventions    (X) Cardiac monitoring    (X) Probable electrolytes, ECG    Medications    (X) Possible medication for underlying heart disease (eg, beta-blockers, diuretics, ACE inhibitors, aspirin)    3/24/2021 2:54 PM EDT by Isaura Caputo      Lopressor 5 mg IV Q 6 hrs    * Milestone   Additional Notes   03/24/2021 Care day 7         Physical Exam   General: Sedated, on vent, no distress   Head: normocephalic, atraumatic   Eyes:No gross abnormalities. ENT:  MMM no lesions, ET and OG tube in   Neck:  supple and no masses   Chest : Good air movement, no wheezing, bibasilar rales, nontender, tympanic   Heart[de-identified] Heart sounds are normal.  Regular rate and rhythm without murmur, gallop or rub. ABD:  symmetric, soft, non-tender, no guarding or rebound   Musculoskeletal : no cyanosis, no clubbing and no edema   Neuro: Awake, follows commands, moves all 4 extremities but weak   Skin: No rashes or nodules noted.    Lymph node:  no cervical nodes   Urology: Yes Sanchez    Psychiatric: Awake and calm         Radiology testing/Labs pertinent:   BUN 44   Creat 1.98   GFR 24.5   Mg++ 3.0   Glucose 214   Procalcitonin 0.42   Albumin 2.9   Globulin 3.7   ALT 68   WBC 19.9      HGB 8.9   HCT 28.5   MCHC 31.3   RDW 16.7   Neutrophils Absolute 17.1   Monocytes Absolute 2.0   APTT 38.2         Meds-name, dose, route,rate:   Scheduled Meds:   insulin glargine, 20 Units, Subcutaneous, Nightly   meropenem, 500 mg, Intravenous, Q12H   methylPREDNISolone, 40 mg, Intravenous, Daily   pantoprazole, 40 mg, Intravenous, Daily   metoprolol, 5 mg, Intravenous, Q6H   nitroglycerin, 0.5 inch, Topical, 4 times per day   heparin (porcine), 5,000 Units, Subcutaneous, 3 times per day   docusate, 100 mg, Oral, BID   polyethylene glycol, 17 g, Oral, Daily   budesonide, 0.5 mg, Nebulization, BID   insulin lispro, 0-18 Units, Subcutaneous, Q6H   chlorhexidine, 15 mL, Mouth/Throat, BID   ipratropium-albuterol, 1 ampule, Inhalation, 4x daily   Continuous Infusions:dexmedetomidine Last Rate: Stopped (03/23/21 1030)   propofol Last Rate: 20 mcg/kg/min (03/24/21 0950)   PRN Meds: Tylenol 650 mg po X 1,  Hydralazine 10 mg IV X 1, Labetalol 20 mg IV X 1         Treatment plan-attending, consults   Pulmonology Assessment:   She is awake on vent, follows command, weak, cough is weak, she tolerated CPAP trial for almost an hour then start getting tachypneic and tired, bowels moving, urine output 800 cc, she still getting fever 101.7, no significant secretions from ET tube, no diarrhea, blood sugar is not controlled she is tolerating tube feed   This is a critically ill patient at risk of deterioration / death , needing close ICU monitoring and intervention due to below noted problems    · Acute hypoxic respiratory failure due to cardiac arrest   · Cardiac arrest, possibly induced by hyperkalemia   · Acute on chronic kidney injury, improving   · Shock physiology resolved   · Diabetes   · High tube feed residuals, likely gastroparesis   Recommendations   · Vent support lung protective strategy   · head of the bed 30°   · Breathing trial today and sedation Pt. with AUSTIN on CKD, Hyponatremia, and hyperkalemia holiday    · Minimize to no sedation   · Watch for ICU delirium: TV on, natural light, avoid benzos, pain control, early mobility, and family engagement   · PUD prophylaxis   · DVT prophylaxis   · Continue cardioprotective meds   · Continue Lantus increased to 20units daily   · Monitor blood sugar target 140-180   · Monitor urine output   · Repeat Lasix x1 today   · Continue Solu-Medrol   · Started meropenem yesterday and continue same, follow-up cultures   · Start physical therapy, up in chair today   Discussed with patient , likely will need tracheostomy, he does not wish to do tracheostomy, he is willing to try for the next 24 to 48 hours and if she continues to fail breathing trials or having weak cough he will consider extubation with no reintubation option.  We will continue to optimize her respiratory status as much as possible meanwhile         Nephrology Assessment:   SAÚL renal status same non oiliguric   Hypoxia encephalopathy    OHDX   Hx DM type-2   Plan:on ventilator  Outcome as per CNS improvement or not          Cardiology Assessment/Plan:   Subjective/HPI remains vented 35% FIO2. No Acute events. Minimally responsive   1. CPA    2. Respiratory failure- remains vented. 3. CAD - LAD mild   4. LVEF 55%   5. Echo Severe LVH. 1-2+ MR RVSP 31 mmHg. LVEF 50%   6. History of HCM - need to Keep HR close to 60s.  Add iv BB.  Will also add Nitrates - post extubation will adjust CV meds.     7. SAÚL - Nephrology following- stable          NEUROLOGY ASSESSMENT AND PLAN:   SUBJECTIVE: Patient remains intubated on light sedation.  She does awaken with name but does not follow any commands.  Sedation was discontinued for a brief period of time and she moves all extremities but did not follow commands according to the nurse   Patient was not able to be extubated and sedation has just been discontinued she is barely following commands.  Case was discussed with    Encephalopathy secondary to cardiorespiratory arrest.  Patient may have some hypoxemic damage though this very difficult to certain as patient is sedated and intubated. Floyce Dates will keep an eye on this repeat CT scan did not anything significant.  We will try and continue to lighten her sedation to reexamine.  For now there is a nonfocal examination   Of the patient's brief extubation 2 days ago patient has not been able to extubate.  She continues on sedation and sedation has just been discontinued and she is barely chest following command but become very weak.  I am not quite sure he can sustain extubation.  We will keep an eye on this.  There appears to be some suggestion that the family does not want any further intervention in terms of trach and PEG though we will continue to discuss this. Shahla Toure does have some right-sided weakness though it is not very apparent and is very difficult examined at this time without sedation. ATTENDING ASSESSMENT AND PLAN     SUBJECTIVE:  Pt seen and examined. Intubated and sedated. Off pressors. Minimal vent settings   # Cardiac arrest s/p ROSC    - initially required Dopamine, norepinephrine and epinephrine infusion   - off pressors and hypothermia protocol   - TTE showed LVEF of 50%   - cardiology following   # Acute hypoxic respiratory failure   - requiring intubation and mechanical ventilation   - extubated 3/22, but had to be re-intubated same day due to stridor and respiratory distress.    - management per critical care service   # Acute encephalopathy   - was following commands and awake and alert during SBT    - likely hypoxic-ischemic etiology from cardiac arrest   - CT head was negative per report   - neurology following   # Acute / chronic anemia    - with Hb of 5.8 on arrival, FOBT was positive   - improved s/p PRBCs given in ED   - on Protonix    - follow H/H    # Lactic acidosis   - in the setting of cardiac arrest   - improved with volume repletion   # Hyperkalemia   - resolved   #

## 2023-04-19 ENCOUNTER — OFFICE VISIT (OUTPATIENT)
Dept: PALLATIVE CARE | Age: 78
End: 2023-04-19
Payer: MEDICARE

## 2023-04-19 VITALS
DIASTOLIC BLOOD PRESSURE: 75 MMHG | HEART RATE: 77 BPM | SYSTOLIC BLOOD PRESSURE: 169 MMHG | OXYGEN SATURATION: 97 % | TEMPERATURE: 96.6 F

## 2023-04-19 DIAGNOSIS — E11.42 DIABETIC PERIPHERAL NEUROPATHY (HCC): ICD-10-CM

## 2023-04-19 DIAGNOSIS — R45.86 EMOTIONAL LABILITY: ICD-10-CM

## 2023-04-19 DIAGNOSIS — M79.642 PAIN IN BOTH HANDS: Primary | ICD-10-CM

## 2023-04-19 DIAGNOSIS — Z51.5 PALLIATIVE CARE ENCOUNTER: ICD-10-CM

## 2023-04-19 DIAGNOSIS — J44.9 CHRONIC OBSTRUCTIVE PULMONARY DISEASE, UNSPECIFIED COPD TYPE (HCC): ICD-10-CM

## 2023-04-19 DIAGNOSIS — F48.2 PSEUDOBULBAR AFFECT: ICD-10-CM

## 2023-04-19 DIAGNOSIS — I50.42 CHRONIC COMBINED SYSTOLIC AND DIASTOLIC CHF (CONGESTIVE HEART FAILURE) (HCC): ICD-10-CM

## 2023-04-19 DIAGNOSIS — M79.641 PAIN IN BOTH HANDS: Primary | ICD-10-CM

## 2023-04-19 DIAGNOSIS — T14.8XXA MUSCLE STRAIN: ICD-10-CM

## 2023-04-19 PROCEDURE — 3077F SYST BP >= 140 MM HG: CPT | Performed by: NURSE PRACTITIONER

## 2023-04-19 PROCEDURE — 1090F PRES/ABSN URINE INCON ASSESS: CPT | Performed by: NURSE PRACTITIONER

## 2023-04-19 PROCEDURE — 99349 HOME/RES VST EST MOD MDM 40: CPT | Performed by: NURSE PRACTITIONER

## 2023-04-19 PROCEDURE — 1036F TOBACCO NON-USER: CPT | Performed by: NURSE PRACTITIONER

## 2023-04-19 PROCEDURE — 3078F DIAST BP <80 MM HG: CPT | Performed by: NURSE PRACTITIONER

## 2023-04-19 PROCEDURE — 1123F ACP DISCUSS/DSCN MKR DOCD: CPT | Performed by: NURSE PRACTITIONER

## 2023-04-19 PROCEDURE — G8417 CALC BMI ABV UP PARAM F/U: HCPCS | Performed by: NURSE PRACTITIONER

## 2023-04-19 PROCEDURE — 3046F HEMOGLOBIN A1C LEVEL >9.0%: CPT | Performed by: NURSE PRACTITIONER

## 2023-04-19 ASSESSMENT — ENCOUNTER SYMPTOMS
BACK PAIN: 0
COLOR CHANGE: 0
WHEEZING: 0
SHORTNESS OF BREATH: 0
DIARRHEA: 0
EYE PAIN: 0
TROUBLE SWALLOWING: 0
ABDOMINAL PAIN: 0
BLOOD IN STOOL: 0
NAUSEA: 0
COUGH: 0
CHOKING: 0
CONSTIPATION: 0
VOMITING: 0
EYE DISCHARGE: 0

## 2023-04-19 NOTE — PROGRESS NOTES
YRS/> REDUCIBLE N/A 2018    TONSILLECTOMY      as child    TUNNELED VENOUS PORT PLACEMENT Right 2021    [address] Berg Duty  2016    w/bx     UPPER GASTROINTESTINAL ENDOSCOPY N/A 2020    EGD possible biopsy performed by Daryle Beecham, MD at [facility] N/A 2020    EGD PUSH ENTEROSCOPY performed by Lucina Gupta MD at [facility] History    Marital status:      Spouse name: Svitlana Cash    Number of children: 2    Years of education: 12    Highest education level: High school graduate   Occupational History    Occupation: Retired-   Tobacco Use    Smoking status: Former     Packs/day: 1.00     Years: 59.00     Pack years: 59.00     Types: Cigarettes     Start date: 2017     Quit date: 10/1/2020     Years since quittin.5    Smokeless tobacco: Never   Vaping Use    Vaping Use: Never used   Substance and Sexual Activity    Alcohol use: Not Currently    Drug use: No    Sexual activity: Yes     Partners: Male   Other Topics Concern    Not on file   Social History Narrative    Grew up in [address] [place] [address] With:  Librado Spouse    Type of Home: House    Home Layout: Two level, Performs ADL's on one level    Home Access: Stairs to enter with rails    Entrance Stairs - Number of Steps: 4    Bathroom Shower/Tub: Tub/Shower unit, Doors    Bathroom Equipment: Shower chair, Grab bars in [place]: Rolling walker, Cane, Oxygen(uses her O2 very seldom)    ADL Assistance: Needs assistance    Homemaking Assistance: Needs assistance    Homemaking Responsibilities: No(spouse performs)    Ambulation Assistance: Independent    Transfer Assistance: Independent    Active : No    Occupation: Retired    Type of occupation: worked in [employer]: patient enjoys [employer]     Social Determinants of

## 2023-04-20 ENCOUNTER — CARE COORDINATION (OUTPATIENT)
Dept: CARE COORDINATION | Age: 78
End: 2023-04-20

## 2023-04-20 NOTE — CARE COORDINATION
ACM called patient. Left message requesting a return call to introduce [facility] program.  Contact information supplied second letter sent via [address] mail and [employer].

## 2023-04-21 ASSESSMENT — ENCOUNTER SYMPTOMS
ABDOMINAL PAIN: 0
RHINORRHEA: 0
VOMITING: 0
SHORTNESS OF BREATH: 0
SORE THROAT: 0
COUGH: 0
NAUSEA: 0
DIARRHEA: 0
BACK PAIN: 0
EYE PAIN: 0

## 2023-04-27 ASSESSMENT — ENCOUNTER SYMPTOMS
ANAL BLEEDING: 0
EYE DISCHARGE: 0
PHOTOPHOBIA: 0
VOMITING: 0
APNEA: 0
SHORTNESS OF BREATH: 1
VOICE CHANGE: 0
ABDOMINAL DISTENTION: 0
BACK PAIN: 1

## 2023-04-28 ENCOUNTER — CARE COORDINATION (OUTPATIENT)
Dept: CARE COORDINATION | Age: 78
End: 2023-04-28

## 2023-04-28 NOTE — CARE COORDINATION
Ambulatory Care Management Note        Date/Time:  4/28/2023 3:28 PM    This patient was received as a referral from  Daily assignment for case management of chronic conditions . Multiple unsuccessful attempts have been made to contact patient. Ambulatory Care Management get in touch letter mailed to the patient's address on file. No further outreach attempts are scheduled by [facility] at this time.

## 2023-05-01 DIAGNOSIS — E78.00 PURE HYPERCHOLESTEROLEMIA: ICD-10-CM

## 2023-05-01 DIAGNOSIS — I50.41 ACUTE COMBINED SYSTOLIC AND DIASTOLIC CHF, NYHA CLASS 1 (HCC): ICD-10-CM

## 2023-05-01 RX ORDER — ROSUVASTATIN CALCIUM 40 MG/1
TABLET, COATED ORAL
Qty: 90 TABLET | Refills: 2 | Status: SHIPPED | OUTPATIENT
Start: 2023-05-01

## 2023-05-01 NOTE — TELEPHONE ENCOUNTER
Please approve or deny this refill request. The order is pended. Thank you.     LOV 2/28/2023    Next Visit Date:  Future Appointments   Date Time Provider Department Center   5/2/2023  2:45 PM Michael Patino MD [facility]   6/1/2023  3:00 PM JOHNATHON Orourke - CNP [facility]   7/11/2023  3:00 PM Keisha Abel MD [address] [place] [address]   9/18/2023  2:00 PM MD Skyler Aguilar [facility] -   2/23/2024  1:45 PM Gerardo Torres MD [facility]

## 2023-05-02 RX ORDER — DIGOXIN 125 MCG
125 TABLET ORAL EVERY OTHER DAY
Qty: 45 TABLET | Refills: 0 | Status: SHIPPED | OUTPATIENT
Start: 2023-05-02

## 2023-05-02 RX ORDER — CARVEDILOL 25 MG/1
TABLET ORAL
Qty: 270 TABLET | Refills: 0 | Status: SHIPPED | OUTPATIENT
Start: 2023-05-02

## 2023-05-02 RX ORDER — AMLODIPINE BESYLATE 5 MG/1
TABLET ORAL
Qty: 90 TABLET | Refills: 0 | Status: SHIPPED | OUTPATIENT
Start: 2023-05-02

## 2023-05-24 DIAGNOSIS — I50.41 ACUTE COMBINED SYSTOLIC AND DIASTOLIC CHF, NYHA CLASS 1 (HCC): ICD-10-CM

## 2023-05-24 DIAGNOSIS — E11.65 UNCONTROLLED TYPE 2 DIABETES MELLITUS WITH HYPERGLYCEMIA (HCC): ICD-10-CM

## 2023-05-24 RX ORDER — BLOOD SUGAR DIAGNOSTIC
STRIP MISCELLANEOUS
Qty: 100 EACH | Refills: 3 | Status: SHIPPED | OUTPATIENT
Start: 2023-05-24

## 2023-05-24 NOTE — TELEPHONE ENCOUNTER
Patient requesting medication refill.  Please approve or deny this request.    Rx requested:  Requested Prescriptions     Pending Prescriptions Disp Refills    ACCU-CHEK PHYLLIS PLUS strip 100 each 3     Sig: Test 3x daily Dx E11.65         Last Office Visit:   4/3/2023      Next Visit Date:  Future Appointments   Date Time Provider Zaid Lr   5/26/2023  2:00 PM [facility] INFUSION BED 2 [address] [place] [address]   6/1/2023  3:00 PM JOHNATHON Villegas CNP [facility]   7/11/2023  3:00 PM Zygmunt Riedel, MD [address] [place] [address]   9/18/2023  2:00 PM [facility]   2/23/2024  1:45 PM Sanchez Reynaga MD [facility]

## 2023-05-26 ENCOUNTER — HOSPITAL ENCOUNTER (OUTPATIENT)
Dept: INFUSION THERAPY | Age: 78
Setting detail: INFUSION SERIES
Discharge: HOME | End: 2023-05-26
Payer: MEDICARE

## 2023-05-26 VITALS
DIASTOLIC BLOOD PRESSURE: 65 MMHG | TEMPERATURE: 97.7 F | SYSTOLIC BLOOD PRESSURE: 138 MMHG | RESPIRATION RATE: 18 BRPM | HEART RATE: 68 BPM

## 2023-05-26 DIAGNOSIS — Z95.828 PORT-A-CATH IN PLACE: Primary | ICD-10-CM

## 2023-05-26 PROCEDURE — 6360000002 HC RX W HCPCS: Performed by: NURSE PRACTITIONER

## 2023-05-26 PROCEDURE — 2580000003 HC RX 258: Performed by: NURSE PRACTITIONER

## 2023-05-26 PROCEDURE — 96523 IRRIG DRUG DELIVERY DEVICE: CPT

## 2023-05-26 RX ORDER — SODIUM CHLORIDE 0.9 % (FLUSH) 0.9 %
5-40 SYRINGE (ML) INJECTION PRN
OUTPATIENT
Start: 2023-06-23

## 2023-05-26 RX ORDER — SODIUM CHLORIDE 0.9 % (FLUSH) 0.9 %
5-40 SYRINGE (ML) INJECTION PRN
Status: DISCONTINUED | OUTPATIENT
Start: 2023-05-26 | End: 2023-05-27 | Stop reason: HOSPADM

## 2023-05-26 RX ORDER — HEPARIN SODIUM (PORCINE) LOCK FLUSH IV SOLN 100 UNIT/ML 100 UNIT/ML
500 SOLUTION INTRAVENOUS PRN
Status: DISCONTINUED | OUTPATIENT
Start: 2023-05-26 | End: 2023-05-27 | Stop reason: HOSPADM

## 2023-05-26 RX ORDER — WATER 1000 ML/1000ML
2.2 INJECTION, SOLUTION INTRAVENOUS ONCE
OUTPATIENT
Start: 2023-06-23 | End: 2023-06-23

## 2023-05-26 RX ORDER — HEPARIN SODIUM (PORCINE) LOCK FLUSH IV SOLN 100 UNIT/ML 100 UNIT/ML
500 SOLUTION INTRAVENOUS PRN
OUTPATIENT
Start: 2023-06-23

## 2023-05-26 RX ADMIN — SODIUM CHLORIDE, PRESERVATIVE FREE 20 ML: 5 INJECTION INTRAVENOUS at 14:23

## 2023-05-26 RX ADMIN — HEPARIN 500 UNITS: 100 SYRINGE at 14:24

## 2023-05-26 NOTE — PROGRESS NOTES
Procedure completed per aseptic technique. Pt tolerated well. No c/o. Card given to call and make next appt. AVS given. All equipment used in the care for this patient has been cleaned.

## 2023-06-01 ENCOUNTER — OFFICE VISIT (OUTPATIENT)
Dept: PALLATIVE CARE | Age: 78
End: 2023-06-01

## 2023-06-01 VITALS
DIASTOLIC BLOOD PRESSURE: 60 MMHG | HEART RATE: 77 BPM | SYSTOLIC BLOOD PRESSURE: 147 MMHG | TEMPERATURE: 98.7 F | OXYGEN SATURATION: 97 %

## 2023-06-01 DIAGNOSIS — J44.9 CHRONIC OBSTRUCTIVE PULMONARY DISEASE, UNSPECIFIED COPD TYPE (HCC): Primary | ICD-10-CM

## 2023-06-01 DIAGNOSIS — I50.42 CHRONIC COMBINED SYSTOLIC AND DIASTOLIC CHF (CONGESTIVE HEART FAILURE) (HCC): ICD-10-CM

## 2023-06-01 DIAGNOSIS — M79.641 PAIN IN BOTH HANDS: ICD-10-CM

## 2023-06-01 DIAGNOSIS — R45.86 EMOTIONAL LABILITY: ICD-10-CM

## 2023-06-01 DIAGNOSIS — M79.642 PAIN IN BOTH HANDS: ICD-10-CM

## 2023-06-01 DIAGNOSIS — Z51.5 PALLIATIVE CARE ENCOUNTER: ICD-10-CM

## 2023-06-01 DIAGNOSIS — E11.42 DIABETIC PERIPHERAL NEUROPATHY (HCC): ICD-10-CM

## 2023-06-01 DIAGNOSIS — F48.2 PSEUDOBULBAR AFFECT: ICD-10-CM

## 2023-06-01 ASSESSMENT — ENCOUNTER SYMPTOMS
DIARRHEA: 0
CONSTIPATION: 0
EYE PAIN: 0
VOMITING: 0
TROUBLE SWALLOWING: 0
WHEEZING: 0
SHORTNESS OF BREATH: 0
COLOR CHANGE: 0
EYE DISCHARGE: 0
COUGH: 0
NAUSEA: 0
BACK PAIN: 0
ABDOMINAL PAIN: 0
BLOOD IN STOOL: 0
CHOKING: 0

## 2023-06-01 NOTE — PROGRESS NOTES
MG CAPS Take 1 capsule by mouth daily      Cholecalciferol (VITAMIN D3) 50 MCG (2000 UT) CAPS Take 1,000 Units by mouth 2 times daily       Accu-Chek Softclix Lancets MISC bid 100 each 3    Blood Glucose Monitoring Suppl (ACCU-CHEK PHYLLIS CONNECT) w/Device KIT 1 kit by Does not apply route daily 1 kit 0     No current facility-administered medications on file prior to visit. Review of Systems   Constitutional:  Negative for activity change, appetite change and unexpected weight change. HENT:  Negative for congestion and trouble swallowing. Eyes:  Positive for visual disturbance (decreased vision- ongoing). Negative for pain and discharge. Respiratory:  Negative for cough, choking, shortness of breath and wheezing. Cardiovascular:  Positive for leg swelling (C/O increased leg swelling.). Negative for chest pain and palpitations. Gastrointestinal:  Negative for abdominal pain, blood in stool, constipation, diarrhea, nausea and vomiting. Genitourinary: Negative. Musculoskeletal:  Positive for arthralgias. Negative for back pain, gait problem and myalgias. Skin:  Negative for color change, rash and wound. Neurological:  Positive for weakness and numbness (BUE/hands). Negative for dizziness, tremors, speech difficulty and light-headedness. Psychiatric/Behavioral:  Positive for dysphoric mood (intermittent crying spells). Negative for confusion and sleep disturbance. The patient is not nervous/anxious. Objective:   BP (!) 147/60   Pulse 77   Temp 98.7 °F (37.1 °C)   LMP 09/30/1995   SpO2 97%    Wt Readings from Last 3 Encounters:   04/11/23 151 lb (68.5 kg)   03/29/23 156 lb (70.8 kg)   02/23/23 155 lb (70.3 kg)     Physical Exam  Constitutional:       General: She is not in acute distress. HENT:      Head: Normocephalic and atraumatic. Nose: No rhinorrhea. Eyes:      General: No scleral icterus. Right eye: No discharge. Left eye: No discharge.       Extraocular

## 2023-06-19 DIAGNOSIS — I50.41 ACUTE COMBINED SYSTOLIC AND DIASTOLIC CHF, NYHA CLASS 1 (HCC): ICD-10-CM

## 2023-06-19 RX ORDER — CARVEDILOL 25 MG/1
TABLET ORAL
Qty: 270 TABLET | Refills: 3 | Status: SHIPPED | OUTPATIENT
Start: 2023-06-19

## 2023-06-19 NOTE — TELEPHONE ENCOUNTER
Comments: incoming fax from [address] Visit (last PCP visit):   2/28/2023    Next Visit Date:  Future Appointments   Date Time Provider Zaid Lr   7/11/2023  3:00 PM Rafaela Ayers MD [address] [place] [address]   7/17/2023  2:00 PM JOHNATHON Watson - CNP [facility]   9/18/2023  2:00 PM MD Michael Sandoval Last Neurology -   2/23/2024  1:45 PM Barbara Lujan MD [facility]       **If hasn't been seen in over a year OR hasn't followed up according to last diabetes/ADHD visit, make appointment for patient before sending refill to provider.     Rx requested:  Requested Prescriptions     Pending Prescriptions Disp Refills    carvedilol (COREG) 25 MG tablet 270 tablet 0

## 2023-07-03 ENCOUNTER — HOSPITAL ENCOUNTER (OUTPATIENT)
Dept: INFUSION THERAPY | Age: 78
Setting detail: INFUSION SERIES
Discharge: HOME | End: 2023-07-03
Payer: MEDICARE

## 2023-07-03 VITALS
TEMPERATURE: 97.8 F | RESPIRATION RATE: 18 BRPM | SYSTOLIC BLOOD PRESSURE: 122 MMHG | DIASTOLIC BLOOD PRESSURE: 59 MMHG | HEART RATE: 75 BPM

## 2023-07-03 DIAGNOSIS — Z95.828 PORT-A-CATH IN PLACE: Primary | ICD-10-CM

## 2023-07-03 PROCEDURE — 6360000002 HC RX W HCPCS: Performed by: NURSE PRACTITIONER

## 2023-07-03 PROCEDURE — 2580000003 HC RX 258: Performed by: NURSE PRACTITIONER

## 2023-07-03 PROCEDURE — 96523 IRRIG DRUG DELIVERY DEVICE: CPT

## 2023-07-03 RX ORDER — HEPARIN SODIUM 100 [USP'U]/ML
500 INJECTION, SOLUTION INTRAVENOUS PRN
OUTPATIENT
Start: 2023-07-17

## 2023-07-03 RX ORDER — SODIUM CHLORIDE 0.9 % (FLUSH) 0.9 %
5-40 SYRINGE (ML) INJECTION PRN
OUTPATIENT
Start: 2023-07-17

## 2023-07-03 RX ORDER — WATER 1000 ML/1000ML
2.2 INJECTION, SOLUTION INTRAVENOUS ONCE
OUTPATIENT
Start: 2023-07-17 | End: 2023-07-17

## 2023-07-03 RX ORDER — HEPARIN SODIUM 100 [USP'U]/ML
500 INJECTION, SOLUTION INTRAVENOUS PRN
Status: DISCONTINUED | OUTPATIENT
Start: 2023-07-03 | End: 2023-07-04 | Stop reason: HOSPADM

## 2023-07-03 RX ORDER — SODIUM CHLORIDE 0.9 % (FLUSH) 0.9 %
5-40 SYRINGE (ML) INJECTION PRN
Status: DISCONTINUED | OUTPATIENT
Start: 2023-07-03 | End: 2023-07-04 | Stop reason: HOSPADM

## 2023-07-03 RX ADMIN — SODIUM CHLORIDE, PRESERVATIVE FREE 10 ML: 5 INJECTION INTRAVENOUS at 14:05

## 2023-07-03 RX ADMIN — HEPARIN 500 UNITS: 100 SYRINGE at 14:05

## 2023-07-03 NOTE — PROGRESS NOTES
Port flush provided, patient tolerated well. Left unit by wheelchair with family assistance. All equipment used in the care for this patient has been cleaned.

## 2023-07-11 ENCOUNTER — OFFICE VISIT (OUTPATIENT)
Dept: CARDIOLOGY CLINIC | Age: 78
End: 2023-07-11
Payer: MEDICARE

## 2023-07-11 VITALS
HEIGHT: 59 IN | DIASTOLIC BLOOD PRESSURE: 53 MMHG | SYSTOLIC BLOOD PRESSURE: 109 MMHG | OXYGEN SATURATION: 98 % | WEIGHT: 146.8 LBS | BODY MASS INDEX: 29.6 KG/M2 | HEART RATE: 78 BPM

## 2023-07-11 DIAGNOSIS — N18.4 STAGE 4 CHRONIC KIDNEY DISEASE (HCC): ICD-10-CM

## 2023-07-11 DIAGNOSIS — I42.1 HOCM (HYPERTROPHIC OBSTRUCTIVE CARDIOMYOPATHY) (HCC): ICD-10-CM

## 2023-07-11 DIAGNOSIS — K92.2 GASTROINTESTINAL HEMORRHAGE, UNSPECIFIED GASTROINTESTINAL HEMORRHAGE TYPE: ICD-10-CM

## 2023-07-11 DIAGNOSIS — I10 ESSENTIAL HYPERTENSION: Primary | ICD-10-CM

## 2023-07-11 DIAGNOSIS — E78.2 MIXED HYPERLIPIDEMIA: ICD-10-CM

## 2023-07-11 DIAGNOSIS — R09.89 BILATERAL CAROTID BRUITS: ICD-10-CM

## 2023-07-11 DIAGNOSIS — R20.0 BILATERAL HAND NUMBNESS: ICD-10-CM

## 2023-07-11 DIAGNOSIS — I50.41 ACUTE COMBINED SYSTOLIC AND DIASTOLIC CHF, NYHA CLASS 1 (HCC): ICD-10-CM

## 2023-07-11 DIAGNOSIS — I25.10 CORONARY ARTERY DISEASE INVOLVING NATIVE CORONARY ARTERY OF NATIVE HEART WITHOUT ANGINA PECTORIS: ICD-10-CM

## 2023-07-11 DIAGNOSIS — I95.2 HYPOTENSION DUE TO DRUGS: ICD-10-CM

## 2023-07-11 DIAGNOSIS — I10 PRIMARY HYPERTENSION: ICD-10-CM

## 2023-07-11 PROCEDURE — G8417 CALC BMI ABV UP PARAM F/U: HCPCS | Performed by: INTERNAL MEDICINE

## 2023-07-11 PROCEDURE — 1090F PRES/ABSN URINE INCON ASSESS: CPT | Performed by: INTERNAL MEDICINE

## 2023-07-11 PROCEDURE — 1123F ACP DISCUSS/DSCN MKR DOCD: CPT | Performed by: INTERNAL MEDICINE

## 2023-07-11 PROCEDURE — G8427 DOCREV CUR MEDS BY ELIG CLIN: HCPCS | Performed by: INTERNAL MEDICINE

## 2023-07-11 PROCEDURE — 1036F TOBACCO NON-USER: CPT | Performed by: INTERNAL MEDICINE

## 2023-07-11 PROCEDURE — 3074F SYST BP LT 130 MM HG: CPT | Performed by: INTERNAL MEDICINE

## 2023-07-11 PROCEDURE — 93000 ELECTROCARDIOGRAM COMPLETE: CPT | Performed by: INTERNAL MEDICINE

## 2023-07-11 PROCEDURE — 99214 OFFICE O/P EST MOD 30 MIN: CPT | Performed by: INTERNAL MEDICINE

## 2023-07-11 PROCEDURE — 3078F DIAST BP <80 MM HG: CPT | Performed by: INTERNAL MEDICINE

## 2023-07-11 PROCEDURE — G8400 PT W/DXA NO RESULTS DOC: HCPCS | Performed by: INTERNAL MEDICINE

## 2023-07-11 RX ORDER — ASPIRIN 81 MG/1
81 TABLET ORAL DAILY
Qty: 90 TABLET | Refills: 3
Start: 2023-07-11

## 2023-07-11 ASSESSMENT — ENCOUNTER SYMPTOMS
STRIDOR: 0
GASTROINTESTINAL NEGATIVE: 1
BLOOD IN STOOL: 0
WHEEZING: 0
RESPIRATORY NEGATIVE: 1
CHEST TIGHTNESS: 0
SHORTNESS OF BREATH: 0
COUGH: 0
EYES NEGATIVE: 1
NAUSEA: 0

## 2023-07-11 NOTE — PROGRESS NOTES
Subsequent Progress Note  Patient: Melody Cheung  YOB: 1945  MRN: [id number]    Chief Complaint: htn HF SOB DM HOCM  No chief complaint on file. CV Data:  3/2019 Echo EF 79% no KYLE   10/8/2019 Cath CX 20-30 LVEF 95%   10/2020 Echo EF 55 Moderate CLVH  10/5/2020 Cath LAD 50-60   3/21 Echo EF 50 Severe LVH 1-2+ MR RVSP 31   0/12 LICA >91  Peak Velocity  233 m/s WU 50-69    Subjective/HPI: she stopped Verapamil 3 days ago due to severe fatigue and weakness. Feels better now. No cp no sob no bleed. C/o nocturnal leg cramps    3/27/2020 Patient and/or health care decision maker is aware that that he may receive a bill for this telephone service, depending on his insurance coverage, and has provided verbal consent to proceed. This visit was completed via telephone. Time spent on the phone with patient 18 minutes. She was to f/u with Dr. Janessa Ruff but was on my VV schedule today. No CP no SOB. Still has occasional night time leg cramps. She is walking and compliant with meds. No LE edema. 1/15/21 recent multiple hospitalization. She was intubated and in shock. Had TVP for Bradycardia but stabilized and did not require PPM.      3/11/21 TELEHEALTH EVALUATION -- Audio/Visual (During [id number]-61 public health emergency)    No cp no sob no falls no bleed. Not walking much. Poor appetite.  /104. .. ran out of Verapamil and Clonidine. 3/18/21 Patient and/or health care decision maker is aware that that he/she may receive a bill for this telephone service, depending on his insurance coverage, and has provided verbal consent to proceed. This visit was completed via telephone. Total time 14 minutes. Had been doing well. This AM she took extra full dose Torsemde 50 mg because she thought she was not uriniating enough.  called in due to her BP being 79/56. She is awake and alert sitting. No cp no sob.  Just feels weak and tired     4/16/21 TELEHEALTH EVALUATION --

## 2023-07-17 ENCOUNTER — OFFICE VISIT (OUTPATIENT)
Dept: PALLATIVE CARE | Age: 78
End: 2023-07-17
Payer: MEDICARE

## 2023-07-17 VITALS
OXYGEN SATURATION: 95 % | SYSTOLIC BLOOD PRESSURE: 128 MMHG | DIASTOLIC BLOOD PRESSURE: 65 MMHG | HEART RATE: 72 BPM | TEMPERATURE: 97.5 F

## 2023-07-17 DIAGNOSIS — J44.9 CHRONIC OBSTRUCTIVE PULMONARY DISEASE, UNSPECIFIED COPD TYPE (HCC): ICD-10-CM

## 2023-07-17 DIAGNOSIS — R45.86 EMOTIONAL LABILITY: ICD-10-CM

## 2023-07-17 DIAGNOSIS — L21.9 SEBORRHEIC DERMATITIS: ICD-10-CM

## 2023-07-17 DIAGNOSIS — I50.42 CHRONIC COMBINED SYSTOLIC AND DIASTOLIC CHF (CONGESTIVE HEART FAILURE) (HCC): ICD-10-CM

## 2023-07-17 DIAGNOSIS — M79.641 PAIN IN BOTH HANDS: ICD-10-CM

## 2023-07-17 DIAGNOSIS — Q27.30 AVM (ARTERIOVENOUS MALFORMATION): ICD-10-CM

## 2023-07-17 DIAGNOSIS — E11.42 DIABETIC PERIPHERAL NEUROPATHY (HCC): ICD-10-CM

## 2023-07-17 DIAGNOSIS — Z87.19 HISTORY OF GI BLEED: Primary | ICD-10-CM

## 2023-07-17 DIAGNOSIS — L30.9 ECZEMA, UNSPECIFIED TYPE: ICD-10-CM

## 2023-07-17 DIAGNOSIS — Z51.5 PALLIATIVE CARE ENCOUNTER: ICD-10-CM

## 2023-07-17 DIAGNOSIS — F48.2 PSEUDOBULBAR AFFECT: ICD-10-CM

## 2023-07-17 DIAGNOSIS — M79.642 PAIN IN BOTH HANDS: ICD-10-CM

## 2023-07-17 PROCEDURE — 3046F HEMOGLOBIN A1C LEVEL >9.0%: CPT | Performed by: NURSE PRACTITIONER

## 2023-07-17 PROCEDURE — 3074F SYST BP LT 130 MM HG: CPT | Performed by: NURSE PRACTITIONER

## 2023-07-17 PROCEDURE — 1036F TOBACCO NON-USER: CPT | Performed by: NURSE PRACTITIONER

## 2023-07-17 PROCEDURE — 1123F ACP DISCUSS/DSCN MKR DOCD: CPT | Performed by: NURSE PRACTITIONER

## 2023-07-17 PROCEDURE — 99349 HOME/RES VST EST MOD MDM 40: CPT | Performed by: NURSE PRACTITIONER

## 2023-07-17 PROCEDURE — G8417 CALC BMI ABV UP PARAM F/U: HCPCS | Performed by: NURSE PRACTITIONER

## 2023-07-17 PROCEDURE — 3078F DIAST BP <80 MM HG: CPT | Performed by: NURSE PRACTITIONER

## 2023-07-17 PROCEDURE — 1090F PRES/ABSN URINE INCON ASSESS: CPT | Performed by: NURSE PRACTITIONER

## 2023-07-17 RX ORDER — MOMETASONE FUROATE 1 MG/G
CREAM TOPICAL
Qty: 15 G | Refills: 1 | Status: SHIPPED | OUTPATIENT
Start: 2023-07-17

## 2023-07-17 RX ORDER — FLUOXETINE HYDROCHLORIDE 20 MG/1
20 CAPSULE ORAL DAILY
Qty: 90 CAPSULE | Refills: 1 | Status: SHIPPED | OUTPATIENT
Start: 2023-07-17

## 2023-07-17 RX ORDER — KETOCONAZOLE 20 MG/G
CREAM TOPICAL
Qty: 30 G | Refills: 1 | Status: SHIPPED | OUTPATIENT
Start: 2023-07-17

## 2023-07-17 ASSESSMENT — ENCOUNTER SYMPTOMS
NAUSEA: 0
COLOR CHANGE: 0
CHOKING: 0
EYE DISCHARGE: 0
TROUBLE SWALLOWING: 0
SHORTNESS OF BREATH: 0
DIARRHEA: 0
CONSTIPATION: 0
VOMITING: 0
ABDOMINAL PAIN: 0
BLOOD IN STOOL: 0
EYE PAIN: 0
COUGH: 0
WHEEZING: 0
BACK PAIN: 0

## 2023-07-17 NOTE — PROGRESS NOTES
Subjective:      Patient Id: Seen Alyssa Maxcy at home in [place] for follow-up palliative medicine visit. She was accompanied to the appointment by: spouse, Delfino Harris, and son, Delfino Harris. Chief Complaint   Patient presents with    Other     Questions regarding aspirin use. Eczema        HPI       Luke Long is a 65 y.o. female with a complex medical history that includes GI bleeding throughout the distal small intestine, AVM malformation, anxiety, asthma, CHF, COPD, CKD depression, fibromyalgia, OA, cardiac arrest, seizures, DM II, CVA, sleep apnea, and pseudobulbar effect. General: Patient is alert, oriented x 4, and in NAD. COPD/asthma: No recent issues with breathing. Dry cough for which daughter has noticed occurring more frequently. No wheezing. CHF: Stable. Following with Dr. La Washington from cardiology. Last visit on 7/11/23. Hand pain/neuropathy: Patient reports she still has bilateral hand pain, but this continues to improved. Tried gabapentin previously. No longer on this. Describes pain as pins and needles pain as well as numbness. Has difficulty with dexterity due to this. Mood/anxiety/depression: Mood has been stable. Currently following with neurology Dr. Carri Belle. Kaitlyn Pries discontinued and patient started on fluoxetine by another provider. Previously diagnosed with pseudobulbar affect. Crying spells controlled. Patient reports she was ordered a carotid ultrasound. She also reports it was recommended she go back on aspirin, but she is concerned due to her hx of GI bleeding. Skin: Reports flaking, itchy scalp. Also has eczema to bilateral outer ears for which she was ordered mometasone. She would like this refilled.        Past Medical History:   Diagnosis Date    Adenomatous polyp of sigmoid colon     Adenomatous polyp of transverse colon     Anxiety     Asthma     dx 2019 / has smoked since age 11    Brainstem stroke [facility])     Cardiopulmonary arrest ([address])     CHF (congestive heart

## 2023-07-20 ENCOUNTER — TELEPHONE (OUTPATIENT)
Dept: FAMILY MEDICINE CLINIC | Age: 78
End: 2023-07-20

## 2023-07-21 ENCOUNTER — HOSPITAL ENCOUNTER (OUTPATIENT)
Dept: ULTRASOUND IMAGING | Age: 78
End: 2023-07-21
Attending: INTERNAL MEDICINE
Payer: MEDICARE

## 2023-07-21 DIAGNOSIS — R09.89 BILATERAL CAROTID BRUITS: ICD-10-CM

## 2023-07-21 PROCEDURE — 93880 EXTRACRANIAL BILAT STUDY: CPT

## 2023-07-21 PROCEDURE — 93880 EXTRACRANIAL BILAT STUDY: CPT | Performed by: INTERNAL MEDICINE

## 2023-08-01 ENCOUNTER — HOSPITAL ENCOUNTER (OUTPATIENT)
Dept: INFUSION THERAPY | Age: 78
Setting detail: INFUSION SERIES
Discharge: HOME | End: 2023-08-01
Payer: MEDICARE

## 2023-08-01 VITALS
HEART RATE: 68 BPM | DIASTOLIC BLOOD PRESSURE: 68 MMHG | RESPIRATION RATE: 18 BRPM | TEMPERATURE: 98.3 F | SYSTOLIC BLOOD PRESSURE: 146 MMHG

## 2023-08-01 DIAGNOSIS — Z95.828 PORT-A-CATH IN PLACE: Primary | ICD-10-CM

## 2023-08-01 LAB
ALBUMIN SERPL-MCNC: 4.2 G/DL (ref 3.5–4.6)
ANION GAP SERPL CALCULATED.3IONS-SCNC: 13 MEQ/L (ref 9–15)
BACTERIA URNS QL MICRO: ABNORMAL /HPF
BILIRUB UR QL STRIP: NEGATIVE
BUN SERPL-MCNC: 23 MG/DL (ref 8–23)
CALCIUM SERPL-MCNC: 9.6 MG/DL (ref 8.5–9.9)
CHLORIDE SERPL-SCNC: 103 MEQ/L (ref 95–107)
CLARITY UR: ABNORMAL
CO2 SERPL-SCNC: 21 MEQ/L (ref 20–31)
COLOR UR: YELLOW
CREAT SERPL-MCNC: 1.87 MG/DL (ref 0.5–0.9)
CREAT UR-MCNC: 299.1 MG/DL
EPI CELLS #/AREA URNS AUTO: >100 /HPF (ref 0–5)
ERYTHROCYTE [DISTWIDTH] IN BLOOD BY AUTOMATED COUNT: 14.6 % (ref 11.5–14.5)
GLUCOSE SERPL-MCNC: 279 MG/DL (ref 70–99)
GLUCOSE UR STRIP-MCNC: 100 MG/DL
HCT VFR BLD AUTO: 38.6 % (ref 37–47)
HGB BLD-MCNC: 12.5 G/DL (ref 12–16)
HGB UR QL STRIP: ABNORMAL
HYALINE CASTS #/AREA URNS AUTO: ABNORMAL /HPF (ref 0–5)
KETONES UR STRIP-MCNC: NEGATIVE MG/DL
LEUKOCYTE ESTERASE UR QL STRIP: NEGATIVE
MCH RBC QN AUTO: 27.7 PG (ref 27–31.3)
MCHC RBC AUTO-ENTMCNC: 32.3 % (ref 33–37)
MCV RBC AUTO: 85.8 FL (ref 79.4–94.8)
NITRITE UR QL STRIP: NEGATIVE
PH UR STRIP: 5.5 [PH] (ref 5–9)
PHOSPHATE SERPL-MCNC: 3.4 MG/DL (ref 2.3–4.8)
PLATELET # BLD AUTO: 289 K/UL (ref 130–400)
POTASSIUM SERPL-SCNC: 4.8 MEQ/L (ref 3.4–4.9)
PROT UR STRIP-MCNC: >=300 MG/DL
PROT UR-MCNC: 296 MG/DL
PROT/CREAT UR-RTO: 1 ML/ML
PROT/CREAT UR-RTO: 1 ML/ML (ref 0–0.2)
RBC # BLD AUTO: 4.51 M/UL (ref 4.2–5.4)
RBC #/AREA URNS AUTO: ABNORMAL /HPF (ref 0–5)
SODIUM SERPL-SCNC: 137 MEQ/L (ref 135–144)
SP GR UR STRIP: 1.02 (ref 1–1.03)
URINE REFLEX TO CULTURE: YES
UROBILINOGEN UR STRIP-ACNC: 1 E.U./DL
WBC # BLD AUTO: 5.8 K/UL (ref 4.8–10.8)
WBC #/AREA URNS AUTO: ABNORMAL /HPF (ref 0–5)

## 2023-08-01 PROCEDURE — 87086 URINE CULTURE/COLONY COUNT: CPT

## 2023-08-01 PROCEDURE — 80069 RENAL FUNCTION PANEL: CPT

## 2023-08-01 PROCEDURE — 82306 VITAMIN D 25 HYDROXY: CPT

## 2023-08-01 PROCEDURE — 85027 COMPLETE CBC AUTOMATED: CPT

## 2023-08-01 PROCEDURE — 83970 ASSAY OF PARATHORMONE: CPT

## 2023-08-01 PROCEDURE — 2580000003 HC RX 258: Performed by: NURSE PRACTITIONER

## 2023-08-01 PROCEDURE — 84156 ASSAY OF PROTEIN URINE: CPT

## 2023-08-01 PROCEDURE — 81001 URINALYSIS AUTO W/SCOPE: CPT

## 2023-08-01 PROCEDURE — 36591 DRAW BLOOD OFF VENOUS DEVICE: CPT

## 2023-08-01 PROCEDURE — 6360000002 HC RX W HCPCS: Performed by: NURSE PRACTITIONER

## 2023-08-01 RX ORDER — WATER 1000 ML/1000ML
2.2 INJECTION, SOLUTION INTRAVENOUS ONCE
OUTPATIENT
Start: 2023-08-29 | End: 2023-08-29

## 2023-08-01 RX ORDER — SODIUM CHLORIDE 0.9 % (FLUSH) 0.9 %
5-40 SYRINGE (ML) INJECTION PRN
Status: DISCONTINUED | OUTPATIENT
Start: 2023-08-01 | End: 2023-08-02 | Stop reason: HOSPADM

## 2023-08-01 RX ORDER — SODIUM CHLORIDE 0.9 % (FLUSH) 0.9 %
5-40 SYRINGE (ML) INJECTION PRN
OUTPATIENT
Start: 2023-08-29

## 2023-08-01 RX ORDER — HEPARIN 100 UNIT/ML
500 SYRINGE INTRAVENOUS PRN
Status: DISCONTINUED | OUTPATIENT
Start: 2023-08-01 | End: 2023-08-02 | Stop reason: HOSPADM

## 2023-08-01 RX ORDER — HEPARIN 100 UNIT/ML
500 SYRINGE INTRAVENOUS PRN
OUTPATIENT
Start: 2023-08-29

## 2023-08-01 RX ADMIN — HEPARIN 500 UNITS: 100 SYRINGE at 14:03

## 2023-08-01 RX ADMIN — SODIUM CHLORIDE, PRESERVATIVE FREE 20 ML: 5 INJECTION INTRAVENOUS at 14:03

## 2023-08-01 NOTE — FLOWSHEET NOTE
Patient to the floor via wheelchair for her labs and port flush. Vital signs taken. Denies any discomfort. Call light within reach.

## 2023-08-01 NOTE — FLOWSHEET NOTE
Labs obtained and port flush is complete. Tolerated well. Left the unit via wheelchair with family. All equipment used in the care for this patient has been cleaned.

## 2023-08-02 LAB
BACTERIA UR CULT: NORMAL
VITAMIN D 25-HYDROXY: 51.8 NG/ML

## 2023-08-03 DIAGNOSIS — E78.00 PURE HYPERCHOLESTEROLEMIA: ICD-10-CM

## 2023-08-03 RX ORDER — ROSUVASTATIN CALCIUM 40 MG/1
40 TABLET, COATED ORAL NIGHTLY
Qty: 90 TABLET | Refills: 0 | Status: SHIPPED | OUTPATIENT
Start: 2023-08-03

## 2023-08-03 NOTE — TELEPHONE ENCOUNTER
Susann Boeck calling from [facility] to see if we got the fax asking for patient to get this RX reduced to 10MG instead of the 40    The fax was just sent yesterday and I did advise that it woudnt be in till possibly later today.

## 2023-08-04 LAB
MISCELLANEOUS LAB TEST ORDER: NORMAL
WHOPPER PROMPT: NORMAL

## 2023-08-07 ENCOUNTER — TELEPHONE (OUTPATIENT)
Dept: FAMILY MEDICINE CLINIC | Age: 78
End: 2023-08-07

## 2023-08-07 NOTE — TELEPHONE ENCOUNTER
----- Message from Ashu Viviana sent at 8/7/2023  9:45 AM EDT -----  Subject: Message to Provider    QUESTIONS  Information for Provider? Patient's port on her right side has tilted as   she gone for her infusions and her  would like to know who the   provider that placed the port was.  ---------------------------------------------------------------------------  --------------  Elana YU  [phone]; OK to leave message on voicemail  ---------------------------------------------------------------------------  --------------  SCRIPT ANSWERS  Relationship to Patient? Spouse/Partner  Representative Name? Kyler Bean  Is the representative on the Communication Release of Information (DANYA)   form in Epic?  Yes

## 2023-08-08 ENCOUNTER — TELEPHONE (OUTPATIENT)
Dept: FAMILY MEDICINE CLINIC | Age: 78
End: 2023-08-08

## 2023-08-08 NOTE — TELEPHONE ENCOUNTER
[facility] is calling regarding a fax they sent for rosuvastatin and the dosing. They need to know if we've received it.  They requested a call back from the .WILLI [phone]

## 2023-08-22 ENCOUNTER — OFFICE VISIT (OUTPATIENT)
Dept: GASTROENTEROLOGY | Age: 78
End: 2023-08-22
Payer: MEDICARE

## 2023-08-22 VITALS
HEIGHT: 59 IN | SYSTOLIC BLOOD PRESSURE: 142 MMHG | BODY MASS INDEX: 30.04 KG/M2 | DIASTOLIC BLOOD PRESSURE: 94 MMHG | WEIGHT: 149 LBS | OXYGEN SATURATION: 98 % | HEART RATE: 80 BPM

## 2023-08-22 DIAGNOSIS — K92.2 GASTROINTESTINAL HEMORRHAGE, UNSPECIFIED GASTROINTESTINAL HEMORRHAGE TYPE: Primary | ICD-10-CM

## 2023-08-22 DIAGNOSIS — K55.20 AVM (ARTERIOVENOUS MALFORMATION) OF SMALL BOWEL, ACQUIRED: ICD-10-CM

## 2023-08-22 PROCEDURE — 1036F TOBACCO NON-USER: CPT | Performed by: SPECIALIST

## 2023-08-22 PROCEDURE — 1090F PRES/ABSN URINE INCON ASSESS: CPT | Performed by: SPECIALIST

## 2023-08-22 PROCEDURE — 3080F DIAST BP >= 90 MM HG: CPT | Performed by: SPECIALIST

## 2023-08-22 PROCEDURE — G8427 DOCREV CUR MEDS BY ELIG CLIN: HCPCS | Performed by: SPECIALIST

## 2023-08-22 PROCEDURE — G8400 PT W/DXA NO RESULTS DOC: HCPCS | Performed by: SPECIALIST

## 2023-08-22 PROCEDURE — 99212 OFFICE O/P EST SF 10 MIN: CPT | Performed by: SPECIALIST

## 2023-08-22 PROCEDURE — 1123F ACP DISCUSS/DSCN MKR DOCD: CPT | Performed by: SPECIALIST

## 2023-08-22 PROCEDURE — G8417 CALC BMI ABV UP PARAM F/U: HCPCS | Performed by: SPECIALIST

## 2023-08-22 PROCEDURE — 3077F SYST BP >= 140 MM HG: CPT | Performed by: SPECIALIST

## 2023-08-22 RX ORDER — PANTOPRAZOLE SODIUM 40 MG/1
TABLET, DELAYED RELEASE ORAL
COMMUNITY
Start: 2023-06-04

## 2023-08-22 RX ORDER — PANTOPRAZOLE SODIUM 40 MG/1
TABLET, DELAYED RELEASE ORAL
Qty: 90 TABLET | Refills: 0 | OUTPATIENT
Start: 2023-08-22

## 2023-08-22 ASSESSMENT — ENCOUNTER SYMPTOMS
EYES NEGATIVE: 1
ABDOMINAL DISTENTION: 0
ABDOMINAL PAIN: 0
VOMITING: 0
RECTAL PAIN: 0
ANAL BLEEDING: 0
GASTROINTESTINAL NEGATIVE: 1
RESPIRATORY NEGATIVE: 1
BLOOD IN STOOL: 0
NAUSEA: 0
CONSTIPATION: 0
DIARRHEA: 0

## 2023-08-22 NOTE — PROGRESS NOTES
Gastroenterology Clinic Follow up Visit    Delvin Bustos  [id number]  Chief Complaint   Patient presents with    Follow-up     Follow up for GI bleed. HPI and A/P at last visit summarized below:  Patient is here for GI follow-up. She has a history of anemia and had a colonoscopy in September 2020 which showed diverticulosis and benign colon polyp, patient also had a EGD in July 2020 by Dr. Vinicius Fisher with a small AVM in the duodenum and subsequently had a capsule endoscopy which showed multiple AVMs in the duodenum jejunum and ileum. Patient has normal BM and not on a blood thinners. On aspirin or NSAIDs. No abdominal pain no nausea no emesis stool is normal, it seems patient developed anemia while on aspirin or Plavix and because of that this was discontinued, recent CBC from August 1, 2023 showed hemoglobin of 12.5 and hematocrit 38.6, May 2022 hemoglobin was 7.5 and hematocrit 23.3 and was on blood thinners at that time. It seems cardiology wants to put her back on blood thinners because of atrial fibrillation. ,  Had CVA x2 in the past, last time patient had CVA was in 2020    Review of Systems   Constitutional: Negative. HENT: Negative. Eyes: Negative. Respiratory: Negative. Cardiovascular: Negative. Gastrointestinal: Negative. Negative for abdominal distention, abdominal pain, anal bleeding, blood in stool, constipation, diarrhea, nausea, rectal pain and vomiting. No GI issues currently. Endocrine: Negative. Genitourinary: Negative. Musculoskeletal: Negative. Skin: Negative. Allergic/Immunologic: Negative for food allergies. Neurological: Negative. Had CVA x2 in the past   Hematological: Negative. Psychiatric/Behavioral: Negative.         Past medical history, past surgical history, medication list, social and familyhistory reviewed    Blood pressure (!) 142/94, pulse 80, height 4' 11\" (1.499 m), weight 149 lb (67.6 kg), last menstrual period

## 2023-08-23 RX ORDER — PANTOPRAZOLE SODIUM 40 MG/1
TABLET, DELAYED RELEASE ORAL
Qty: 90 TABLET | Refills: 0 | OUTPATIENT
Start: 2023-08-23

## 2023-09-05 ENCOUNTER — OFFICE VISIT (OUTPATIENT)
Dept: PALLATIVE CARE | Age: 78
End: 2023-09-05

## 2023-09-05 ENCOUNTER — OFFICE VISIT (OUTPATIENT)
Dept: PALLATIVE CARE | Age: 78
End: 2023-09-05
Payer: MEDICARE

## 2023-09-05 VITALS
HEART RATE: 75 BPM | TEMPERATURE: 96.6 F | OXYGEN SATURATION: 97 % | DIASTOLIC BLOOD PRESSURE: 62 MMHG | SYSTOLIC BLOOD PRESSURE: 144 MMHG

## 2023-09-05 VITALS
TEMPERATURE: 96.6 F | HEART RATE: 75 BPM | OXYGEN SATURATION: 97 % | DIASTOLIC BLOOD PRESSURE: 62 MMHG | SYSTOLIC BLOOD PRESSURE: 144 MMHG

## 2023-09-05 DIAGNOSIS — R53.83 FATIGUE, UNSPECIFIED TYPE: Primary | ICD-10-CM

## 2023-09-05 DIAGNOSIS — F32.A DEPRESSION, UNSPECIFIED DEPRESSION TYPE: ICD-10-CM

## 2023-09-05 DIAGNOSIS — Z00.00 MEDICARE ANNUAL WELLNESS VISIT, SUBSEQUENT: Primary | ICD-10-CM

## 2023-09-05 DIAGNOSIS — M79.641 PAIN IN BOTH HANDS: ICD-10-CM

## 2023-09-05 DIAGNOSIS — Z51.5 PALLIATIVE CARE ENCOUNTER: ICD-10-CM

## 2023-09-05 DIAGNOSIS — R45.86 EMOTIONAL LABILITY: ICD-10-CM

## 2023-09-05 DIAGNOSIS — Z95.828 PORT-A-CATH IN PLACE: ICD-10-CM

## 2023-09-05 DIAGNOSIS — M79.642 PAIN IN BOTH HANDS: ICD-10-CM

## 2023-09-05 DIAGNOSIS — E11.42 DIABETIC PERIPHERAL NEUROPATHY (HCC): ICD-10-CM

## 2023-09-05 DIAGNOSIS — R82.90 FOUL SMELLING URINE: ICD-10-CM

## 2023-09-05 DIAGNOSIS — F48.2 PSEUDOBULBAR AFFECT: ICD-10-CM

## 2023-09-05 DIAGNOSIS — I50.42 CHRONIC COMBINED SYSTOLIC AND DIASTOLIC CHF (CONGESTIVE HEART FAILURE) (HCC): ICD-10-CM

## 2023-09-05 DIAGNOSIS — J44.9 CHRONIC OBSTRUCTIVE PULMONARY DISEASE, UNSPECIFIED COPD TYPE (HCC): ICD-10-CM

## 2023-09-05 PROCEDURE — 1123F ACP DISCUSS/DSCN MKR DOCD: CPT | Performed by: NURSE PRACTITIONER

## 2023-09-05 PROCEDURE — 3077F SYST BP >= 140 MM HG: CPT | Performed by: NURSE PRACTITIONER

## 2023-09-05 PROCEDURE — G0439 PPPS, SUBSEQ VISIT: HCPCS | Performed by: NURSE PRACTITIONER

## 2023-09-05 PROCEDURE — 3078F DIAST BP <80 MM HG: CPT | Performed by: NURSE PRACTITIONER

## 2023-09-05 RX ORDER — NITROFURANTOIN MACROCRYSTALS 50 MG/1
50 CAPSULE ORAL DAILY
COMMUNITY
Start: 2023-08-22

## 2023-09-05 RX ORDER — FLUOXETINE HYDROCHLORIDE 20 MG/1
20 CAPSULE ORAL DAILY
COMMUNITY

## 2023-09-05 ASSESSMENT — ENCOUNTER SYMPTOMS
BLOOD IN STOOL: 0
WHEEZING: 0
EYE PAIN: 0
COLOR CHANGE: 0
VOMITING: 0
SHORTNESS OF BREATH: 0
ABDOMINAL PAIN: 0
TROUBLE SWALLOWING: 0
EYE DISCHARGE: 0
NAUSEA: 0
BACK PAIN: 0
DIARRHEA: 0
CONSTIPATION: 0

## 2023-09-05 ASSESSMENT — PATIENT HEALTH QUESTIONNAIRE - PHQ9
1. LITTLE INTEREST OR PLEASURE IN DOING THINGS: 1
8. MOVING OR SPEAKING SO SLOWLY THAT OTHER PEOPLE COULD HAVE NOTICED. OR THE OPPOSITE, BEING SO FIGETY OR RESTLESS THAT YOU HAVE BEEN MOVING AROUND A LOT MORE THAN USUAL: 0
3. TROUBLE FALLING OR STAYING ASLEEP: 2
6. FEELING BAD ABOUT YOURSELF - OR THAT YOU ARE A FAILURE OR HAVE LET YOURSELF OR YOUR FAMILY DOWN: 1
SUM OF ALL RESPONSES TO PHQ QUESTIONS 1-9: 11
SUM OF ALL RESPONSES TO PHQ QUESTIONS 1-9: 11
SUM OF ALL RESPONSES TO PHQ9 QUESTIONS 1 & 2: 3
2. FEELING DOWN, DEPRESSED OR HOPELESS: 2
SUM OF ALL RESPONSES TO PHQ QUESTIONS 1-9: 11
4. FEELING TIRED OR HAVING LITTLE ENERGY: 1
10. IF YOU CHECKED OFF ANY PROBLEMS, HOW DIFFICULT HAVE THESE PROBLEMS MADE IT FOR YOU TO DO YOUR WORK, TAKE CARE OF THINGS AT HOME, OR GET ALONG WITH OTHER PEOPLE: 1
SUM OF ALL RESPONSES TO PHQ QUESTIONS 1-9: 11
5. POOR APPETITE OR OVEREATING: 3
7. TROUBLE CONCENTRATING ON THINGS, SUCH AS READING THE NEWSPAPER OR WATCHING TELEVISION: 1
9. THOUGHTS THAT YOU WOULD BE BETTER OFF DEAD, OR OF HURTING YOURSELF: 0

## 2023-09-05 ASSESSMENT — LIFESTYLE VARIABLES
HOW OFTEN DO YOU HAVE A DRINK CONTAINING ALCOHOL: NEVER
HOW MANY STANDARD DRINKS CONTAINING ALCOHOL DO YOU HAVE ON A TYPICAL DAY: PATIENT DOES NOT DRINK

## 2023-09-05 ASSESSMENT — COLUMBIA-SUICIDE SEVERITY RATING SCALE - C-SSRS
2. HAVE YOU ACTUALLY HAD ANY THOUGHTS OF KILLING YOURSELF?: NO
1. WITHIN THE PAST MONTH, HAVE YOU WISHED YOU WERE DEAD OR WISHED YOU COULD GO TO SLEEP AND NOT WAKE UP?: NO
6. HAVE YOU EVER DONE ANYTHING, STARTED TO DO ANYTHING, OR PREPARED TO DO ANYTHING TO END YOUR LIFE?: NO

## 2023-09-05 NOTE — PROGRESS NOTES
Subjective:      Patient Id: Seen Jessica Da Silva at home in [facility] for follow-up palliative medicine visit. She was accompanied to the appointment by: spouse, Fransisco Hill. Chief Complaint   Patient presents with    Follow-up    Fatigue    Mediport     IP issue. HPI       Delvin Bustos is a 77 y.o. female with a complex medical history that includes GI bleeding throughout the distal small intestine, AVM malformation, anxiety, asthma, CHF, COPD, CKD depression, fibromyalgia, OA, cardiac arrest, seizures, DM II, CVA, sleep apnea, and pseudobulbar effect. General: Patient is alert, oriented x 4, and in NAD. COPD/asthma: No recent issues with breathing. Dry cough. No wheezing. CHF: Stable. No leg swelling. Following with Dr. Nessa Cuello from cardiology. Hand pain/neuropathy: Patient reports bilateral hand pain has improved. Patient associating this improvement with Prozac use. Tried gabapentin previously. No longer on this. Describes pain as pins and needles pain as well as numbness. Has difficulty with dexterity due to this. Mood/anxiety/depression: Patient admits to depression and hopelessness. No active suicidal thoughts. She is tearful when discussing her depression. She is disappointed she cannot perform activities she was previously able. Currently following with neurology Dr. Ivone Rios. Gaynelle Bale previously discontinued and patient started on fluoxetine by another provider. Previously diagnosed with pseudobulbar affect. Hx of GI bleed: Had f/u with GI. Patient/family decided not to go back on aspirin and felt the risks outweighed the benefits. Skin: Reports flaking, itchy scalp has cleared with ketoconazle. No longer using this. Patient reports her port has flipped over. She reports needing referral to IR to have this investigated. Patient and spouse feel patient may have a UTI. She is more fatigued than normal and her urine has a new foul smell.        Past Medical History:   Diagnosis Date

## 2023-09-05 NOTE — PROGRESS NOTES
Medicare Annual Wellness Visit    Jennifer Aguilar is here for Medicare AWV    Assessment & Plan   Medicare annual wellness visit, subsequent  Recommendations for Preventive Services Due: see orders and patient instructions/AVS.  Recommended screening schedule for the next 5-10 years is provided to the patient in written form: see Patient Instructions/AVS.     Return in about 1 year (around 9/5/2024). Subjective       Patient's complete Health Risk Assessment and screening values have been reviewed and are found in Flowsheets. The following problems were reviewed today and where indicated follow up appointments were made and/or referrals ordered. Positive Risk Factor Screenings with Interventions:    Fall Risk:  Do you feel unsteady or are you worried about falling? : (!) yes (Sometimes. Has cane and walker if needed.)  2 or more falls in past year?: no  Fall with injury in past year?: no     Interventions:    Patient comments: Has cane and walker if needed. Cognitive: Words recalled: 1 Word Recalled   Clock Drawing Test (CDT): (!) Abnormal   Total Score: (!) 1   Total Score Interpretation: Abnormal Mini-Cog      Interventions:  Patient declines any further evaluation or treatment    Depression:  PHQ-2 Score: 3  PHQ-9 Total Score: 11    Interpretation:   1-4 = minimal  5-9 = mild  10-14 = moderate  15-19 = moderately severe  20-27 = severe  Interventions:  Patient advised to follow-up in this office for further evaluation and treatment  Life style changes to improve mood reviewed  Patient to set goal for activity she'd like to complete at home in addition to one hobby she'd like to participate in. General HRA Questions:  Select all that apply: (!) Social Isolation    Social Isolation Interventions:  Patient advised to follow up in the office for further evaluation and treatment  Discussed lifestyle changes. Need to address underlying depression.  Patient reports her daughter will take her

## 2023-09-06 DIAGNOSIS — Z95.828 PORT-A-CATH IN PLACE: Primary | ICD-10-CM

## 2023-09-07 ENCOUNTER — HOSPITAL ENCOUNTER (OUTPATIENT)
Dept: INTERVENTIONAL RADIOLOGY/VASCULAR | Age: 78
Discharge: HOME | End: 2023-09-09
Payer: MEDICARE

## 2023-09-07 DIAGNOSIS — R82.90 FOUL SMELLING URINE: ICD-10-CM

## 2023-09-07 DIAGNOSIS — R53.83 FATIGUE, UNSPECIFIED TYPE: ICD-10-CM

## 2023-09-07 DIAGNOSIS — Z95.828 PORT-A-CATH IN PLACE: ICD-10-CM

## 2023-09-07 LAB
BACTERIA URNS QL MICRO: NEGATIVE /HPF
BILIRUB UR QL STRIP: NEGATIVE
CLARITY UR: ABNORMAL
COLOR UR: YELLOW
EPI CELLS #/AREA URNS AUTO: ABNORMAL /HPF (ref 0–5)
GLUCOSE UR STRIP-MCNC: NEGATIVE MG/DL
HGB UR QL STRIP: ABNORMAL
HYALINE CASTS #/AREA URNS AUTO: ABNORMAL /HPF (ref 0–5)
KETONES UR STRIP-MCNC: ABNORMAL MG/DL
LEUKOCYTE ESTERASE UR QL STRIP: NEGATIVE
NITRITE UR QL STRIP: NEGATIVE
PH UR STRIP: 5.5 [PH] (ref 5–9)
PROT UR STRIP-MCNC: >=300 MG/DL
RBC #/AREA URNS AUTO: ABNORMAL /HPF (ref 0–5)
SP GR UR STRIP: 1.03 (ref 1–1.03)
URINE REFLEX TO CULTURE: ABNORMAL
UROBILINOGEN UR STRIP-ACNC: 1 E.U./DL
WBC #/AREA URNS AUTO: ABNORMAL /HPF (ref 0–5)

## 2023-09-07 PROCEDURE — 36598 INJ W/FLUOR EVAL CV DEVICE: CPT

## 2023-09-07 PROCEDURE — 2709999900 IR CONTRAST INJECTION  EXISTING CV ACCESS DEVICE EVALUATION W FLUORO

## 2023-09-07 PROCEDURE — 6360000002 HC RX W HCPCS: Performed by: RADIOLOGY

## 2023-09-07 PROCEDURE — 6360000004 HC RX CONTRAST MEDICATION: Performed by: RADIOLOGY

## 2023-09-07 RX ORDER — HEPARIN 100 UNIT/ML
SYRINGE INTRAVENOUS PRN
Status: COMPLETED | OUTPATIENT
Start: 2023-09-07 | End: 2023-09-07

## 2023-09-07 RX ORDER — IODIXANOL 320 MG/ML
INJECTION, SOLUTION INTRAVASCULAR PRN
Status: COMPLETED | OUTPATIENT
Start: 2023-09-07 | End: 2023-09-07

## 2023-09-07 RX ADMIN — HEPARIN 5 ML: 100 SYRINGE at 14:47

## 2023-09-07 RX ADMIN — IODIXANOL 11 ML: 320 INJECTION, SOLUTION INTRAVASCULAR at 14:46

## 2023-09-07 NOTE — OR NURSING
Patient with right mediport accessed by MJ-RN with 20Gx0.75\" Bard power port needle prior to arriving to procedural room and assisted onto IR table. Able to aspirate blood and able to flush port with normal saline. Consent confirmed. No allergy to IV contrast dye confirmed. Dr. Justin Mayberry injected 11 cc contrast and visualized with fluoro guidance. Port placement verified in SVC and may continue to use. Port flushed with 10cc of normal saline then instilled with 5cc Heparinized saline and Port deaccessed. Patient family updated per pt request, by MJ-RN. Pt discharged via ambulation with personal rolator to family in waiting room at this time by FARA. Electronically signed by Meg Damon RN on 9/7/2023 at 2:50 PM      TOTAL CONTRAST: 11ml

## 2023-09-10 ENCOUNTER — TELEPHONE (OUTPATIENT)
Dept: MEDSURG UNIT | Age: 78
End: 2023-09-10

## 2023-09-10 ASSESSMENT — ENCOUNTER SYMPTOMS: COUGH: 1

## 2023-09-12 DIAGNOSIS — E11.65 UNCONTROLLED TYPE 2 DIABETES MELLITUS WITH HYPERGLYCEMIA (HCC): ICD-10-CM

## 2023-09-12 RX ORDER — INSULIN LISPRO 100 [IU]/ML
INJECTION, SOLUTION INTRAVENOUS; SUBCUTANEOUS
Qty: 30 ML | Refills: 3 | Status: SHIPPED | OUTPATIENT
Start: 2023-09-12

## 2023-09-17 DIAGNOSIS — Z87.19 HISTORY OF GI BLEED: Primary | ICD-10-CM

## 2023-09-17 RX ORDER — PANTOPRAZOLE SODIUM 40 MG/1
TABLET, DELAYED RELEASE ORAL
Qty: 90 TABLET | Refills: 0 | Status: SHIPPED | OUTPATIENT
Start: 2023-09-17

## 2023-10-09 DIAGNOSIS — Z87.19 HISTORY OF GI BLEED: ICD-10-CM

## 2023-10-09 DIAGNOSIS — I50.41 ACUTE COMBINED SYSTOLIC AND DIASTOLIC CHF, NYHA CLASS 1 (HCC): ICD-10-CM

## 2023-10-09 RX ORDER — AMLODIPINE BESYLATE 5 MG/1
5 TABLET ORAL DAILY
Qty: 90 TABLET | Refills: 3 | Status: SHIPPED | OUTPATIENT
Start: 2023-10-09

## 2023-10-09 RX ORDER — PANTOPRAZOLE SODIUM 40 MG/1
TABLET, DELAYED RELEASE ORAL
Qty: 90 TABLET | Refills: 0 | Status: SHIPPED | OUTPATIENT
Start: 2023-10-09

## 2023-10-09 NOTE — TELEPHONE ENCOUNTER
Please approve or deny this refill request. The order is pended. Thank you.     LOV 7/11/2023    Next Visit Date:  Future Appointments   Date Time Provider [address]   10/12/2023  3:00 PM [facility] [address]   10/16/2023  3:00 PM Eric Laguerre, APRN - CNP [facility]   11/13/2023  2:00 PM Nic Blair MD [facility]

## 2023-10-12 ENCOUNTER — HOSPITAL ENCOUNTER (OUTPATIENT)
Dept: INFUSION THERAPY | Age: 78
Setting detail: INFUSION SERIES
Discharge: HOME | End: 2023-10-12

## 2023-10-16 ENCOUNTER — OFFICE VISIT (OUTPATIENT)
Dept: PALLATIVE CARE | Age: 78
End: 2023-10-16
Payer: MEDICARE

## 2023-10-16 VITALS — SYSTOLIC BLOOD PRESSURE: 146 MMHG | DIASTOLIC BLOOD PRESSURE: 74 MMHG | HEART RATE: 68 BPM | TEMPERATURE: 97.3 F

## 2023-10-16 DIAGNOSIS — F48.2 PSEUDOBULBAR AFFECT: ICD-10-CM

## 2023-10-16 DIAGNOSIS — M79.642 PAIN IN BOTH HANDS: ICD-10-CM

## 2023-10-16 DIAGNOSIS — R82.2 BILIRUBINURIA: ICD-10-CM

## 2023-10-16 DIAGNOSIS — I50.42 CHRONIC COMBINED SYSTOLIC AND DIASTOLIC CHF (CONGESTIVE HEART FAILURE) (HCC): ICD-10-CM

## 2023-10-16 DIAGNOSIS — E11.42 DIABETIC PERIPHERAL NEUROPATHY (HCC): ICD-10-CM

## 2023-10-16 DIAGNOSIS — J44.9 CHRONIC OBSTRUCTIVE PULMONARY DISEASE, UNSPECIFIED COPD TYPE (HCC): ICD-10-CM

## 2023-10-16 DIAGNOSIS — F32.A DEPRESSION, UNSPECIFIED DEPRESSION TYPE: Primary | ICD-10-CM

## 2023-10-16 DIAGNOSIS — M79.641 PAIN IN BOTH HANDS: ICD-10-CM

## 2023-10-16 DIAGNOSIS — R45.86 EMOTIONAL LABILITY: ICD-10-CM

## 2023-10-16 PROCEDURE — 1036F TOBACCO NON-USER: CPT | Performed by: NURSE PRACTITIONER

## 2023-10-16 PROCEDURE — 99349 HOME/RES VST EST MOD MDM 40: CPT | Performed by: NURSE PRACTITIONER

## 2023-10-16 PROCEDURE — 3046F HEMOGLOBIN A1C LEVEL >9.0%: CPT | Performed by: NURSE PRACTITIONER

## 2023-10-16 PROCEDURE — 1090F PRES/ABSN URINE INCON ASSESS: CPT | Performed by: NURSE PRACTITIONER

## 2023-10-16 PROCEDURE — 3074F SYST BP LT 130 MM HG: CPT | Performed by: NURSE PRACTITIONER

## 2023-10-16 PROCEDURE — 1123F ACP DISCUSS/DSCN MKR DOCD: CPT | Performed by: NURSE PRACTITIONER

## 2023-10-16 PROCEDURE — G8484 FLU IMMUNIZE NO ADMIN: HCPCS | Performed by: NURSE PRACTITIONER

## 2023-10-16 PROCEDURE — G8417 CALC BMI ABV UP PARAM F/U: HCPCS | Performed by: NURSE PRACTITIONER

## 2023-10-16 PROCEDURE — 3078F DIAST BP <80 MM HG: CPT | Performed by: NURSE PRACTITIONER

## 2023-10-16 RX ORDER — FLUOXETINE 10 MG/1
10 CAPSULE ORAL DAILY
Qty: 30 CAPSULE | Refills: 0 | Status: SHIPPED | OUTPATIENT
Start: 2023-10-16

## 2023-10-16 ASSESSMENT — ENCOUNTER SYMPTOMS
CONSTIPATION: 0
EYE DISCHARGE: 0
BLOOD IN STOOL: 0
COLOR CHANGE: 0
COUGH: 1
DIARRHEA: 0
BACK PAIN: 0
WHEEZING: 0
NAUSEA: 0
ABDOMINAL PAIN: 0
SHORTNESS OF BREATH: 0
TROUBLE SWALLOWING: 0
VOMITING: 0
EYE PAIN: 0

## 2023-10-16 NOTE — PROGRESS NOTES
tolerated. Continue combivent and duonebs as ordered. 3. Pseudobulbar affect  4. Emotional lability  5. Depression  Following with neurology. Mood has been depressed. Was previously started on fluoxetine 20 mg po daily by another provider and nudexta was discontinued. Patient feels depression has worsened since starting fluoxetine. Will wean off. Start 10 mg po daily x 30 days. Call with next dosing instructions. May need to resume [employer] in the setting of ongoing emotional lability. This had improved when she was on this medication. Patient/family decline psychiatry referral at this time. 6. Diabetic peripheral neuropathy  7. Pain in both hands  Ongoing, intermittent issue. Stable. 8. Bilirubinuria  Likely in the setting of blood in urine. Of note, patient did just have course of amoxicillin. If patient wants, will order CMP. Reviewed UA and advised that other abnormal results should be addressed by urology. 9. Palliative care encounter  Discussed symptom management related to chronic disease/condition. Provided emotional support and active listening. Patient understands and is agreeable to current plan. Due to acuity, symptomatology and high-risk medication management, I advised patient to Return in about 7 weeks (around 12/4/2023).      MDM: JOHNATHON Haynes - CNP    Collaborating physician: Dr. Senthil Ivan

## 2023-10-18 ENCOUNTER — HOSPITAL ENCOUNTER (OUTPATIENT)
Dept: INFUSION THERAPY | Age: 78
Setting detail: INFUSION SERIES
Discharge: HOME | End: 2023-10-18
Payer: MEDICARE

## 2023-10-18 VITALS
HEART RATE: 63 BPM | TEMPERATURE: 98.5 F | DIASTOLIC BLOOD PRESSURE: 55 MMHG | SYSTOLIC BLOOD PRESSURE: 116 MMHG | RESPIRATION RATE: 18 BRPM

## 2023-10-18 DIAGNOSIS — Z95.828 PORT-A-CATH IN PLACE: Primary | ICD-10-CM

## 2023-10-18 LAB
ALBUMIN SERPL-MCNC: 3.9 G/DL (ref 3.5–4.6)
ALP SERPL-CCNC: 129 U/L (ref 40–130)
ALT SERPL-CCNC: 26 U/L (ref 0–33)
ANION GAP SERPL CALCULATED.3IONS-SCNC: 10 MEQ/L (ref 9–15)
AST SERPL-CCNC: 18 U/L (ref 0–35)
BILIRUB SERPL-MCNC: 0.3 MG/DL (ref 0.2–0.7)
BUN SERPL-MCNC: 23 MG/DL (ref 8–23)
CALCIUM SERPL-MCNC: 9.6 MG/DL (ref 8.5–9.9)
CHLORIDE SERPL-SCNC: 106 MEQ/L (ref 95–107)
CO2 SERPL-SCNC: 23 MEQ/L (ref 20–31)
CREAT SERPL-MCNC: 2.24 MG/DL (ref 0.5–0.9)
GLOBULIN SER CALC-MCNC: 2.7 G/DL (ref 2.3–3.5)
GLUCOSE SERPL-MCNC: 185 MG/DL (ref 70–99)
POTASSIUM SERPL-SCNC: 4.2 MEQ/L (ref 3.4–4.9)
PROT SERPL-MCNC: 6.6 G/DL (ref 6.3–8)
SODIUM SERPL-SCNC: 139 MEQ/L (ref 135–144)

## 2023-10-18 PROCEDURE — 2580000003 HC RX 258: Performed by: NURSE PRACTITIONER

## 2023-10-18 PROCEDURE — 80053 COMPREHEN METABOLIC PANEL: CPT

## 2023-10-18 PROCEDURE — 36591 DRAW BLOOD OFF VENOUS DEVICE: CPT

## 2023-10-18 PROCEDURE — 6360000002 HC RX W HCPCS: Performed by: NURSE PRACTITIONER

## 2023-10-18 RX ORDER — WATER 1000 ML/1000ML
2.2 INJECTION, SOLUTION INTRAVENOUS ONCE
OUTPATIENT
Start: 2023-10-25 | End: 2023-10-25

## 2023-10-18 RX ORDER — HEPARIN 100 UNIT/ML
500 SYRINGE INTRAVENOUS PRN
OUTPATIENT
Start: 2023-10-25

## 2023-10-18 RX ORDER — SODIUM CHLORIDE 0.9 % (FLUSH) 0.9 %
5-40 SYRINGE (ML) INJECTION PRN
OUTPATIENT
Start: 2023-10-25

## 2023-10-18 RX ORDER — SODIUM CHLORIDE 0.9 % (FLUSH) 0.9 %
5-40 SYRINGE (ML) INJECTION PRN
Status: DISCONTINUED | OUTPATIENT
Start: 2023-10-18 | End: 2023-10-19 | Stop reason: HOSPADM

## 2023-10-18 RX ORDER — HEPARIN 100 UNIT/ML
500 SYRINGE INTRAVENOUS PRN
Status: DISCONTINUED | OUTPATIENT
Start: 2023-10-18 | End: 2023-10-19 | Stop reason: HOSPADM

## 2023-10-18 RX ADMIN — SODIUM CHLORIDE, PRESERVATIVE FREE 20 ML: 5 INJECTION INTRAVENOUS at 15:39

## 2023-10-18 RX ADMIN — HEPARIN 500 UNITS: 100 SYRINGE at 15:39

## 2023-10-18 NOTE — FLOWSHEET NOTE
Labs obtained and sent. Port flush is complete. Tolerated well. Left the unit via wheelchair. All equipment used in the care for this patient has been cleaned.

## 2023-11-02 ENCOUNTER — OFFICE VISIT (OUTPATIENT)
Dept: ENDOCRINOLOGY | Age: 78
End: 2023-11-02

## 2023-11-02 VITALS
WEIGHT: 149 LBS | HEIGHT: 59 IN | DIASTOLIC BLOOD PRESSURE: 82 MMHG | SYSTOLIC BLOOD PRESSURE: 129 MMHG | BODY MASS INDEX: 30.04 KG/M2 | HEART RATE: 73 BPM

## 2023-11-02 DIAGNOSIS — E11.69 TYPE 2 DIABETES MELLITUS WITH OTHER SPECIFIED COMPLICATION, WITH LONG-TERM CURRENT USE OF INSULIN (HCC): Primary | ICD-10-CM

## 2023-11-02 DIAGNOSIS — Z79.4 TYPE 2 DIABETES MELLITUS WITH OTHER SPECIFIED COMPLICATION, WITH LONG-TERM CURRENT USE OF INSULIN (HCC): Primary | ICD-10-CM

## 2023-11-02 LAB
CHP ED QC CHECK: NORMAL
GLUCOSE BLD-MCNC: 343 MG/DL
HBA1C MFR BLD: 11.1 %

## 2023-11-02 RX ORDER — ACYCLOVIR 400 MG/1
1 TABLET ORAL
Qty: 12 EACH | Refills: 10 | Status: SHIPPED | OUTPATIENT
Start: 2023-11-02

## 2023-11-02 RX ORDER — LANCETS 30 GAUGE
EACH MISCELLANEOUS
Qty: 50 EACH | Refills: 3 | Status: SHIPPED | OUTPATIENT
Start: 2023-11-02

## 2023-11-02 RX ORDER — ACYCLOVIR 400 MG/1
1 TABLET ORAL DAILY
Qty: 1 EACH | Refills: 0 | Status: SHIPPED | OUTPATIENT
Start: 2023-11-02

## 2023-11-02 RX ORDER — INSULIN DEGLUDEC INJECTION 100 U/ML
INJECTION, SOLUTION SUBCUTANEOUS
Qty: 5 ADJUSTABLE DOSE PRE-FILLED PEN SYRINGE | Refills: 3 | Status: SHIPPED | OUTPATIENT
Start: 2023-11-02

## 2023-11-02 ASSESSMENT — ENCOUNTER SYMPTOMS
ABDOMINAL PAIN: 0
VOMITING: 0
SINUS PRESSURE: 0
EYE REDNESS: 0
WHEEZING: 0
DIARRHEA: 0
COUGH: 0
NAUSEA: 0
RHINORRHEA: 0
EYE PAIN: 0
SHORTNESS OF BREATH: 0
SORE THROAT: 0

## 2023-11-02 NOTE — PATIENT INSTRUCTIONS
Endocrinology-    Check your blood sugars 4 times a day, before meals and at night  Document these numbers in a blood glucose log and bring them with you to your follow-up appointment. If you are prescribed insulin, Do not take your mealtime insulin if your blood sugars less than 150   Call our office if you have blood sugars less than 80 or greater then 300 on two or more occasions  Call our office if you have any questions regarding your blood sugars or insulin dosing regiment  Signs of low blood sugar may include tremors, feeling shaky, sweating, dizziness, confusion and weakness. Check your blood sugar immediatly if you have any of these symptoms.      The plan as discussed at your appointment-

## 2023-11-05 DIAGNOSIS — R45.86 EMOTIONAL LABILITY: ICD-10-CM

## 2023-11-05 DIAGNOSIS — I50.41 ACUTE COMBINED SYSTOLIC AND DIASTOLIC CHF, NYHA CLASS 1 (HCC): ICD-10-CM

## 2023-11-05 RX ORDER — DIGOXIN 125 MCG
125 TABLET ORAL EVERY OTHER DAY
Qty: 45 TABLET | Refills: 3 | Status: SHIPPED | OUTPATIENT
Start: 2023-11-05

## 2023-11-06 ENCOUNTER — TELEPHONE (OUTPATIENT)
Dept: MEDSURG UNIT | Age: 78
End: 2023-11-06

## 2023-11-06 DIAGNOSIS — R45.86 EMOTIONAL LABILITY: Primary | ICD-10-CM

## 2023-11-06 RX ORDER — FLUOXETINE HYDROCHLORIDE 20 MG/1
20 CAPSULE ORAL DAILY
Qty: 90 CAPSULE | Refills: 0 | OUTPATIENT
Start: 2023-11-06

## 2023-11-06 RX ORDER — FLUOXETINE 10 MG/1
5 TABLET, FILM COATED ORAL DAILY
Qty: 15 TABLET | Refills: 0 | Status: SHIPPED | OUTPATIENT
Start: 2023-11-06

## 2023-11-06 NOTE — TELEPHONE ENCOUNTER
Called to update patient's spouse, Destiny Cutler on weaning instructions: Take 1/2 tab (5 mg) po daily x 30 days. Patient to call with next weaning instructions. Destiny Cutler in understanding and I confirmed her next appt for 12/6. He said her mood is \"pretty good\". Her shoulder is bothering her but he is going to reach out to her PCP for possible x-rays.

## 2023-11-06 NOTE — TELEPHONE ENCOUNTER
Weaning off fluoxetine. Next instructions: Take 1/2 tab (5 mg) po daily x 30 days. Patient to call with next weaning instructions.

## 2023-11-08 ENCOUNTER — OFFICE VISIT (OUTPATIENT)
Dept: FAMILY MEDICINE CLINIC | Age: 78
End: 2023-11-08
Payer: MEDICARE

## 2023-11-08 VITALS
WEIGHT: 145 LBS | SYSTOLIC BLOOD PRESSURE: 134 MMHG | TEMPERATURE: 97.5 F | HEIGHT: 59 IN | DIASTOLIC BLOOD PRESSURE: 62 MMHG | HEART RATE: 74 BPM | OXYGEN SATURATION: 98 % | BODY MASS INDEX: 29.23 KG/M2

## 2023-11-08 DIAGNOSIS — T14.90XA INJURY: Primary | ICD-10-CM

## 2023-11-08 DIAGNOSIS — M25.512 ACUTE PAIN OF LEFT SHOULDER: ICD-10-CM

## 2023-11-08 PROCEDURE — 99213 OFFICE O/P EST LOW 20 MIN: CPT | Performed by: NURSE PRACTITIONER

## 2023-11-08 PROCEDURE — 3078F DIAST BP <80 MM HG: CPT | Performed by: NURSE PRACTITIONER

## 2023-11-08 PROCEDURE — 1090F PRES/ABSN URINE INCON ASSESS: CPT | Performed by: NURSE PRACTITIONER

## 2023-11-08 PROCEDURE — 3074F SYST BP LT 130 MM HG: CPT | Performed by: NURSE PRACTITIONER

## 2023-11-08 PROCEDURE — G8400 PT W/DXA NO RESULTS DOC: HCPCS | Performed by: NURSE PRACTITIONER

## 2023-11-08 PROCEDURE — G8484 FLU IMMUNIZE NO ADMIN: HCPCS | Performed by: NURSE PRACTITIONER

## 2023-11-08 PROCEDURE — G8417 CALC BMI ABV UP PARAM F/U: HCPCS | Performed by: NURSE PRACTITIONER

## 2023-11-08 PROCEDURE — G8427 DOCREV CUR MEDS BY ELIG CLIN: HCPCS | Performed by: NURSE PRACTITIONER

## 2023-11-08 PROCEDURE — 1123F ACP DISCUSS/DSCN MKR DOCD: CPT | Performed by: NURSE PRACTITIONER

## 2023-11-08 PROCEDURE — 1036F TOBACCO NON-USER: CPT | Performed by: NURSE PRACTITIONER

## 2023-11-08 RX ORDER — HYDROCODONE BITARTRATE AND ACETAMINOPHEN 5; 325 MG/1; MG/1
1 TABLET ORAL EVERY 6 HOURS PRN
Qty: 10 TABLET | Refills: 0 | Status: SHIPPED | OUTPATIENT
Start: 2023-11-08 | End: 2023-11-11

## 2023-11-08 NOTE — PROGRESS NOTES
Subjective  Chele Bismark, 77 y.o. female presents today with:  Chief Complaint   Patient presents with    Shoulder Pain     Pt states left shoulder pain from a fall she experienced 3 weeks ago. Pt states she has taken tylenol/aspirin for pain. HPI  Presents to [facility] for shoulder pain   Symptoms began 2 weeks prior after a fall   States she was exercising on stair master & fell forward   Happened quickly, unsure if braced fall with arm or if fell directly over   C/o ongoing pain and swelling   Difficulty raising left arm   Pain at rest   Pain affecting sleep. Hasn't been able to sleep over last 7 nights  Pain radiates down upper arm   No prior injury of affected site   Initially iced   She has tried Tylenol and icy hot   History of GI bleed   Arthritis   Fibromyalgia   Follows with Palliative care                 Past Medical History:   Diagnosis Date    Adenomatous polyp of sigmoid colon     Adenomatous polyp of transverse colon     Anxiety     Asthma     dx 2019 / has smoked since age 11    Brainstem stroke ([address])     Cardiopulmonary arrest ([address])     CHF (congestive heart failure) ([address])     Chronic back pain     Bilateral L5 S1 Radic on emg--surprisingly worse on the left than the right--pt's symptoms and her MRI show worse on the right    Chronic obstructive pulmonary disease with acute exacerbation ([address]) 10/12/2019    Depression     ESBL E. coli carrier     Carrier. Fibromyalgia     Gastrointestinal hemorrhage 2/24/2020    Hyperlipidemia     meds > 8 yrs    Hypertension     meds > 45 yrs    Insomnia 12/4/2013    On home O2     2l per n/c at bedtime mostly,     Osteoarthritis     Seizures ([address])     Sepsis ([address]) 10/6/2020    Smoker 6/18/2013    Type II diabetes mellitus, uncontrolled     hx > 8 yrs    Unspecified sleep apnea       Past Surgical History:   Procedure Laterality Date    BACK SURGERY  2017    lumbar disc    CARDIAC CATHETERIZATION  11/03/2014    DR. MIRELES / no stents    COLONOSCOPY

## 2023-11-09 ASSESSMENT — ENCOUNTER SYMPTOMS: NAUSEA: 0

## 2023-11-13 ENCOUNTER — OFFICE VISIT (OUTPATIENT)
Dept: CARDIOLOGY CLINIC | Age: 78
End: 2023-11-13
Payer: MEDICARE

## 2023-11-13 VITALS
SYSTOLIC BLOOD PRESSURE: 118 MMHG | HEART RATE: 64 BPM | BODY MASS INDEX: 29.29 KG/M2 | DIASTOLIC BLOOD PRESSURE: 60 MMHG | OXYGEN SATURATION: 95 % | HEIGHT: 59 IN

## 2023-11-13 DIAGNOSIS — R09.89 BILATERAL CAROTID BRUITS: ICD-10-CM

## 2023-11-13 DIAGNOSIS — I10 ESSENTIAL HYPERTENSION: ICD-10-CM

## 2023-11-13 DIAGNOSIS — I25.10 CORONARY ARTERY DISEASE INVOLVING NATIVE CORONARY ARTERY OF NATIVE HEART WITHOUT ANGINA PECTORIS: ICD-10-CM

## 2023-11-13 DIAGNOSIS — I95.2 HYPOTENSION DUE TO DRUGS: ICD-10-CM

## 2023-11-13 DIAGNOSIS — E78.2 MIXED HYPERLIPIDEMIA: ICD-10-CM

## 2023-11-13 DIAGNOSIS — I10 PRIMARY HYPERTENSION: ICD-10-CM

## 2023-11-13 DIAGNOSIS — I42.1 HOCM (HYPERTROPHIC OBSTRUCTIVE CARDIOMYOPATHY) (HCC): ICD-10-CM

## 2023-11-13 DIAGNOSIS — I50.41 ACUTE COMBINED SYSTOLIC AND DIASTOLIC CHF, NYHA CLASS 1 (HCC): Primary | ICD-10-CM

## 2023-11-13 DIAGNOSIS — R20.0 BILATERAL HAND NUMBNESS: ICD-10-CM

## 2023-11-13 PROCEDURE — G8427 DOCREV CUR MEDS BY ELIG CLIN: HCPCS | Performed by: INTERNAL MEDICINE

## 2023-11-13 PROCEDURE — 3078F DIAST BP <80 MM HG: CPT | Performed by: INTERNAL MEDICINE

## 2023-11-13 PROCEDURE — 3074F SYST BP LT 130 MM HG: CPT | Performed by: INTERNAL MEDICINE

## 2023-11-13 PROCEDURE — 1036F TOBACCO NON-USER: CPT | Performed by: INTERNAL MEDICINE

## 2023-11-13 PROCEDURE — 1123F ACP DISCUSS/DSCN MKR DOCD: CPT | Performed by: INTERNAL MEDICINE

## 2023-11-13 PROCEDURE — G8484 FLU IMMUNIZE NO ADMIN: HCPCS | Performed by: INTERNAL MEDICINE

## 2023-11-13 PROCEDURE — G8417 CALC BMI ABV UP PARAM F/U: HCPCS | Performed by: INTERNAL MEDICINE

## 2023-11-13 PROCEDURE — 99214 OFFICE O/P EST MOD 30 MIN: CPT | Performed by: INTERNAL MEDICINE

## 2023-11-13 PROCEDURE — 1090F PRES/ABSN URINE INCON ASSESS: CPT | Performed by: INTERNAL MEDICINE

## 2023-11-13 PROCEDURE — G8400 PT W/DXA NO RESULTS DOC: HCPCS | Performed by: INTERNAL MEDICINE

## 2023-11-13 ASSESSMENT — ENCOUNTER SYMPTOMS
CHEST TIGHTNESS: 0
RESPIRATORY NEGATIVE: 1
SHORTNESS OF BREATH: 0
STRIDOR: 0
GASTROINTESTINAL NEGATIVE: 1
EYES NEGATIVE: 1
BLOOD IN STOOL: 0
WHEEZING: 0
COUGH: 0
NAUSEA: 0

## 2023-11-13 NOTE — PROGRESS NOTES
Subsequent Progress Note  Patient: Eugenio Trejo  YOB: 1945  MRN: [id number]    Chief Complaint: htn HF SOB DM HOCM  Chief Complaint   Patient presents with    Follow-up     4 month    Hypertension         CV Data:  3/2019 Echo EF 79% no KYLE   10/8/2019 Cath CX 20-30 LVEF 95%   10/2020 Echo EF 55 Moderate CLVH  10/5/2020 Cath LAD 50-60   3/21 Echo EF 50 Severe LVH 1-2+ MR RVSP 31   7/37 LICA >49  Peak Velocity  233 m/s WU 50-69  7/23 CUS Moderate     Subjective/HPI: she stopped Verapamil 3 days ago due to severe fatigue and weakness. Feels better now. No cp no sob no bleed. C/o nocturnal leg cramps    3/27/2020 Patient and/or health care decision maker is aware that that he may receive a bill for this telephone service, depending on his insurance coverage, and has provided verbal consent to proceed. This visit was completed via telephone. Time spent on the phone with patient 18 minutes. She was to f/u with Dr. Felix Heath but was on my VV schedule today. No CP no SOB. Still has occasional night time leg cramps. She is walking and compliant with meds. No LE edema. 1/15/21 recent multiple hospitalization. She was intubated and in shock. Had TVP for Bradycardia but stabilized and did not require PPM.      3/11/21 TELEHEALTH EVALUATION -- Audio/Visual (During ZFXON-56 public health emergency)    No cp no sob no falls no bleed. Not walking much. Poor appetite.  /104. .. ran out of Verapamil and Clonidine. 3/18/21 Patient and/or health care decision maker is aware that that he/she may receive a bill for this telephone service, depending on his insurance coverage, and has provided verbal consent to proceed. This visit was completed via telephone. Total time 14 minutes. Had been doing well. This AM she took extra full dose Torsemde 50 mg because she thought she was not uriniating enough.  called in due to her BP being 79/56. She is awake and alert sitting.  No

## 2023-11-14 NOTE — PROGRESS NOTES
Detail Level: Simple Subjective:      Patient ID: Sherin Davila is a 67 y.o. female. HPI   Patient is here accompanied by her  for ER follow-up hospitalization for CVA patient has complicated history uncontrolled diabetes coronary artery disease status post PTCA and stenting seizure disorder  Patient states her vision became very blurred over the course of the day. After seeing her ophthalmologist she contacted Kaye Narayan MD who suggested she go to the ER  Patient admits to poor dietary habits blood sugars range from 200s to 400s-also only taking 2-3 doses of insulin daily  Imaging carotid arteries also show significant stenosis  Has consult scheduled with endocrinology  Patient has follow-up with cardiology Cho MD in 2 weeks  Review of Systems   All other systems reviewed and are negative. Objective:   Physical Exam  Constitutional:       General: She is not in acute distress. Appearance: She is obese. She is not ill-appearing. HENT:      Head: Normocephalic and atraumatic. Eyes:      Extraocular Movements: Extraocular movements intact. Conjunctiva/sclera: Conjunctivae normal.      Pupils: Pupils are equal, round, and reactive to light. Neck:      Thyroid: No thyromegaly. Vascular: Carotid bruit present. Cardiovascular:      Rate and Rhythm: Normal rate and regular rhythm. Heart sounds: Murmur heard. Pulmonary:      Effort: Pulmonary effort is normal.      Breath sounds: Normal breath sounds. Abdominal:      General: Bowel sounds are normal.      Palpations: Abdomen is soft. There is no mass. Tenderness: There is no abdominal tenderness. There is no guarding. Musculoskeletal:         General: Normal range of motion. Cervical back: Normal range of motion and neck supple. Lymphadenopathy:      Cervical: No cervical adenopathy. Skin:     General: Skin is warm and dry. Coloration: Skin is not jaundiced or pale.    Neurological:      Mental Status: She is alert and oriented to person, place, and time. Coordination: Coordination normal.      Comments: Exam from wheelchair   Psychiatric:         Mood and Affect: Mood normal.         Behavior: Behavior normal.         Thought Content: Thought content normal.         Judgment: Judgment normal.              Assessment & Plan    Diagnosis Orders   1. Cerebrovascular accident (CVA), unspecified mechanism ([facility] [address]     2. Seizure disorder ([facility] [address]     3. Type 2 diabetes mellitus with other circulatory complication, with long-term current use of insulin (HCC)      Stressed importance of compliance with diet and medication consider nutritional consult   4. Coronary artery disease due to lipid rich plaque     5. Bilateral carotid artery stenosis     spent 40 min with pt and her  reviewing chart and answering their questions      No orders of the defined types were placed in this encounter. No orders of the defined types were placed in this encounter. There are no discontinued medications. Return in about 3 months (around 7/12/2022) for follow up with your PCP as directed.     Mar Morocho PA-C Detail Level: Zone Patient Specific Counseling (Will Not Stick From Patient To Patient): Discussed chemical peel would be charged as cosmetic and an out of pocket fee is required.

## 2023-12-04 DIAGNOSIS — R45.86 EMOTIONAL LABILITY: ICD-10-CM

## 2023-12-04 RX ORDER — FLUOXETINE 10 MG/1
TABLET, FILM COATED ORAL
Qty: 15 TABLET | Refills: 0 | Status: SHIPPED | OUTPATIENT
Start: 2023-12-04

## 2023-12-06 ENCOUNTER — OFFICE VISIT (OUTPATIENT)
Dept: PALLATIVE CARE | Age: 78
End: 2023-12-06
Payer: MEDICARE

## 2023-12-06 VITALS
TEMPERATURE: 97 F | DIASTOLIC BLOOD PRESSURE: 62 MMHG | HEART RATE: 71 BPM | SYSTOLIC BLOOD PRESSURE: 132 MMHG | OXYGEN SATURATION: 95 %

## 2023-12-06 DIAGNOSIS — F32.A DEPRESSION, UNSPECIFIED DEPRESSION TYPE: ICD-10-CM

## 2023-12-06 DIAGNOSIS — M25.512 ACUTE PAIN OF LEFT SHOULDER: ICD-10-CM

## 2023-12-06 DIAGNOSIS — Z87.19 HISTORY OF GI BLEED: ICD-10-CM

## 2023-12-06 DIAGNOSIS — I50.42 CHRONIC COMBINED SYSTOLIC AND DIASTOLIC CHF (CONGESTIVE HEART FAILURE) (HCC): ICD-10-CM

## 2023-12-06 DIAGNOSIS — M79.641 PAIN IN BOTH HANDS: Primary | ICD-10-CM

## 2023-12-06 DIAGNOSIS — Z51.5 PALLIATIVE CARE ENCOUNTER: ICD-10-CM

## 2023-12-06 DIAGNOSIS — R45.86 EMOTIONAL LABILITY: ICD-10-CM

## 2023-12-06 DIAGNOSIS — M79.642 PAIN IN BOTH HANDS: Primary | ICD-10-CM

## 2023-12-06 DIAGNOSIS — E11.42 DIABETIC PERIPHERAL NEUROPATHY (HCC): ICD-10-CM

## 2023-12-06 DIAGNOSIS — F48.2 PSEUDOBULBAR AFFECT: ICD-10-CM

## 2023-12-06 DIAGNOSIS — J44.9 CHRONIC OBSTRUCTIVE PULMONARY DISEASE, UNSPECIFIED COPD TYPE (HCC): ICD-10-CM

## 2023-12-06 PROCEDURE — 99349 HOME/RES VST EST MOD MDM 40: CPT | Performed by: NURSE PRACTITIONER

## 2023-12-06 PROCEDURE — 1123F ACP DISCUSS/DSCN MKR DOCD: CPT | Performed by: NURSE PRACTITIONER

## 2023-12-06 PROCEDURE — 3078F DIAST BP <80 MM HG: CPT | Performed by: NURSE PRACTITIONER

## 2023-12-06 PROCEDURE — G8417 CALC BMI ABV UP PARAM F/U: HCPCS | Performed by: NURSE PRACTITIONER

## 2023-12-06 PROCEDURE — 1090F PRES/ABSN URINE INCON ASSESS: CPT | Performed by: NURSE PRACTITIONER

## 2023-12-06 PROCEDURE — 3075F SYST BP GE 130 - 139MM HG: CPT | Performed by: NURSE PRACTITIONER

## 2023-12-06 PROCEDURE — G8484 FLU IMMUNIZE NO ADMIN: HCPCS | Performed by: NURSE PRACTITIONER

## 2023-12-06 PROCEDURE — 1036F TOBACCO NON-USER: CPT | Performed by: NURSE PRACTITIONER

## 2023-12-06 PROCEDURE — 3046F HEMOGLOBIN A1C LEVEL >9.0%: CPT | Performed by: NURSE PRACTITIONER

## 2023-12-06 RX ORDER — HYDROCODONE BITARTRATE AND ACETAMINOPHEN 5; 325 MG/1; MG/1
0.5 TABLET ORAL EVERY 6 HOURS PRN
Qty: 14 TABLET | Refills: 0 | Status: SHIPPED | OUTPATIENT
Start: 2023-12-06 | End: 2023-12-13

## 2023-12-06 RX ORDER — HYDROCODONE BITARTRATE AND ACETAMINOPHEN 5; 325 MG/1; MG/1
0.5 TABLET ORAL EVERY 6 HOURS PRN
Qty: 14 TABLET | Refills: 0 | Status: SHIPPED | OUTPATIENT
Start: 2023-12-06 | End: 2023-12-06

## 2023-12-06 ASSESSMENT — ENCOUNTER SYMPTOMS
COLOR CHANGE: 0
NAUSEA: 0
VOMITING: 0
EYE PAIN: 0
BACK PAIN: 0
WHEEZING: 0
EYE DISCHARGE: 0
DIARRHEA: 0
ABDOMINAL PAIN: 0
SHORTNESS OF BREATH: 0
COUGH: 1
BLOOD IN STOOL: 0
TROUBLE SWALLOWING: 0
CONSTIPATION: 0

## 2023-12-07 ENCOUNTER — HOSPITAL ENCOUNTER (OUTPATIENT)
Dept: INFUSION THERAPY | Age: 78
Setting detail: INFUSION SERIES
Discharge: HOME | End: 2023-12-07

## 2023-12-12 ENCOUNTER — HOSPITAL ENCOUNTER (OUTPATIENT)
Dept: INFUSION THERAPY | Age: 78
Setting detail: INFUSION SERIES
Discharge: HOME | End: 2023-12-12

## 2023-12-12 DIAGNOSIS — Z95.828 PORT-A-CATH IN PLACE: Primary | ICD-10-CM

## 2023-12-12 RX ORDER — WATER 10 ML/10ML
2.2 INJECTION INTRAMUSCULAR; INTRAVENOUS; SUBCUTANEOUS ONCE
OUTPATIENT
Start: 2024-01-09 | End: 2024-01-09

## 2023-12-12 RX ORDER — HEPARIN 100 UNIT/ML
500 SYRINGE INTRAVENOUS PRN
OUTPATIENT
Start: 2024-01-09

## 2023-12-12 RX ORDER — SODIUM CHLORIDE 0.9 % (FLUSH) 0.9 %
5-40 SYRINGE (ML) INJECTION PRN
OUTPATIENT
Start: 2024-01-09

## 2023-12-12 RX ORDER — HEPARIN 100 UNIT/ML
500 SYRINGE INTRAVENOUS PRN
Status: DISCONTINUED | OUTPATIENT
Start: 2023-12-12 | End: 2023-12-13 | Stop reason: HOSPADM

## 2023-12-12 RX ORDER — SODIUM CHLORIDE 0.9 % (FLUSH) 0.9 %
5-40 SYRINGE (ML) INJECTION PRN
Status: DISCONTINUED | OUTPATIENT
Start: 2023-12-12 | End: 2023-12-13 | Stop reason: HOSPADM

## 2023-12-19 ENCOUNTER — HOSPITAL ENCOUNTER (OUTPATIENT)
Dept: INFUSION THERAPY | Age: 78
Setting detail: INFUSION SERIES
Discharge: HOME | End: 2023-12-19

## 2023-12-28 ENCOUNTER — HOSPITAL ENCOUNTER (OUTPATIENT)
Dept: INFUSION THERAPY | Age: 78
Setting detail: INFUSION SERIES
Discharge: HOME | End: 2023-12-28
Payer: MEDICARE

## 2023-12-28 VITALS
RESPIRATION RATE: 18 BRPM | HEART RATE: 68 BPM | TEMPERATURE: 98.3 F | DIASTOLIC BLOOD PRESSURE: 67 MMHG | SYSTOLIC BLOOD PRESSURE: 134 MMHG

## 2023-12-28 DIAGNOSIS — Z95.828 PORT-A-CATH IN PLACE: Primary | ICD-10-CM

## 2023-12-28 PROCEDURE — 6360000002 HC RX W HCPCS: Performed by: NURSE PRACTITIONER

## 2023-12-28 PROCEDURE — 96523 IRRIG DRUG DELIVERY DEVICE: CPT

## 2023-12-28 PROCEDURE — 2580000003 HC RX 258: Performed by: NURSE PRACTITIONER

## 2023-12-28 RX ORDER — SODIUM CHLORIDE 0.9 % (FLUSH) 0.9 %
5-40 SYRINGE (ML) INJECTION PRN
Status: DISCONTINUED | OUTPATIENT
Start: 2023-12-28 | End: 2023-12-29 | Stop reason: HOSPADM

## 2023-12-28 RX ORDER — WATER 10 ML/10ML
2.2 INJECTION INTRAMUSCULAR; INTRAVENOUS; SUBCUTANEOUS ONCE
OUTPATIENT
Start: 2024-01-25 | End: 2024-01-25

## 2023-12-28 RX ORDER — HEPARIN 100 UNIT/ML
500 SYRINGE INTRAVENOUS PRN
Status: DISCONTINUED | OUTPATIENT
Start: 2023-12-28 | End: 2023-12-29 | Stop reason: HOSPADM

## 2023-12-28 RX ORDER — SODIUM CHLORIDE 0.9 % (FLUSH) 0.9 %
5-40 SYRINGE (ML) INJECTION PRN
OUTPATIENT
Start: 2024-01-25

## 2023-12-28 RX ORDER — HEPARIN 100 UNIT/ML
500 SYRINGE INTRAVENOUS PRN
OUTPATIENT
Start: 2024-01-25

## 2023-12-28 RX ORDER — WATER 10 ML/10ML
2.2 INJECTION INTRAMUSCULAR; INTRAVENOUS; SUBCUTANEOUS ONCE
Status: CANCELLED | OUTPATIENT
Start: 2024-01-09 | End: 2024-01-09

## 2023-12-28 RX ADMIN — SODIUM CHLORIDE, PRESERVATIVE FREE 10 ML: 5 INJECTION INTRAVENOUS at 14:09

## 2023-12-28 RX ADMIN — HEPARIN 500 UNITS: 100 SYRINGE at 14:10

## 2023-12-28 NOTE — PROGRESS NOTES
Port flushed via aseptic technique. Pt tolerated well. Pt declined AVS.  Left unit with spouse and daughter. All equipment used in the care for this patient has been cleaned.

## 2024-01-01 NOTE — ED TRIAGE NOTES
Acute illness visit note    Chief Complaint   Patient presents with    Follow-up     Head shape       PCP: Mann Meza MD    HISTORY OF PRESENT ILLNESS: Irving Cagle is a 4 month old male who presents with Mom and Dad.  Irving is coming in today to discuss his head shape.    He was initially noted to have a moderate amount of plagiocephaly at his 2-month visit with a slightly restricted range of motion and at that point I recommended aggressive repositioning and physical therapy.  He has been consistently doing physical therapy with [facility] but they do not seem to notice any improvement with the measurements of his head shape.  When he was seen in the office here earlier in the month for his 4-month well examination he had still mild to moderate positional plagiocephaly and we had discussed that if things were not improving, or worsening that helmet therapy would be considered.  Family is coming in today to discuss that possibility.    Family also like to discuss any event that happened a couple of times when he was being swelled at night by dad where he would clean shop forward and then relax back.  Since they have stopped swelling his arms they have not really noticed it but wanted to bring it up as a discussion point.    The problem list was reviewed and updated as follows:  There are no problems to display for this patient.      Current Outpatient Medications   Medication Sig Dispense Refill    Cholecalciferol (VITAMIN D INFANT PO)        No current facility-administered medications for this visit.       Allergies as of 2024    (No Known Allergies)       Physical Exam  Vitals:Visit Vitals  Pulse 132   Temp 98.6 °F (37 °C) (Tympanic)   Resp 40   Wt 6.22 kg (13 lb 11.4 oz)   HC 41.8 cm (16.46\")     General: Alert, interactive, no distress.  HEENT: Notable right-sided, moderate positional plagiocephaly.  NECK: Supple with full range of motion, No significant adenopathy, No  Pt complains of SOB for last 3 days. Throughout the weekend pt has been trying breathing treatment with no relief. Labored breaths, skin warm dry. Pt had 2 breathing treatment this am with no relief. masses      Assessment:   Right positonal plagiocephaly moderate    Plan:   We again discussed his head shape and given that he has not seen any significant improvement with the more objective data collected by her physical therapist as well as subjectively on my exams I think would be reasonable to start to consider cranial orthosis.  Reassurance was provided at this time given the events that the family was worried about.  We discussed    Mann Meza MD

## 2024-01-09 ENCOUNTER — OFFICE VISIT (OUTPATIENT)
Dept: PALLATIVE CARE | Age: 79
End: 2024-01-09
Payer: MEDICARE

## 2024-01-09 VITALS
HEART RATE: 72 BPM | OXYGEN SATURATION: 98 % | TEMPERATURE: 97.8 F | DIASTOLIC BLOOD PRESSURE: 57 MMHG | SYSTOLIC BLOOD PRESSURE: 117 MMHG

## 2024-01-09 DIAGNOSIS — M79.642 PAIN IN BOTH HANDS: ICD-10-CM

## 2024-01-09 DIAGNOSIS — I50.42 CHRONIC COMBINED SYSTOLIC AND DIASTOLIC CHF (CONGESTIVE HEART FAILURE) (HCC): ICD-10-CM

## 2024-01-09 DIAGNOSIS — Z51.5 PALLIATIVE CARE ENCOUNTER: ICD-10-CM

## 2024-01-09 DIAGNOSIS — M79.641 PAIN IN BOTH HANDS: ICD-10-CM

## 2024-01-09 DIAGNOSIS — J44.9 CHRONIC OBSTRUCTIVE PULMONARY DISEASE, UNSPECIFIED COPD TYPE (HCC): ICD-10-CM

## 2024-01-09 DIAGNOSIS — R45.86 EMOTIONAL LABILITY: ICD-10-CM

## 2024-01-09 DIAGNOSIS — Z87.19 HISTORY OF GI BLEED: ICD-10-CM

## 2024-01-09 DIAGNOSIS — F32.A DEPRESSION, UNSPECIFIED DEPRESSION TYPE: ICD-10-CM

## 2024-01-09 DIAGNOSIS — N18.4 STAGE 4 CHRONIC KIDNEY DISEASE (HCC): ICD-10-CM

## 2024-01-09 DIAGNOSIS — E11.42 DIABETIC PERIPHERAL NEUROPATHY (HCC): ICD-10-CM

## 2024-01-09 DIAGNOSIS — F48.2 PSEUDOBULBAR AFFECT: Primary | ICD-10-CM

## 2024-01-09 PROCEDURE — 99349 HOME/RES VST EST MOD MDM 40: CPT | Performed by: NURSE PRACTITIONER

## 2024-01-09 PROCEDURE — 1036F TOBACCO NON-USER: CPT | Performed by: NURSE PRACTITIONER

## 2024-01-09 PROCEDURE — G8417 CALC BMI ABV UP PARAM F/U: HCPCS | Performed by: NURSE PRACTITIONER

## 2024-01-09 PROCEDURE — 3074F SYST BP LT 130 MM HG: CPT | Performed by: NURSE PRACTITIONER

## 2024-01-09 PROCEDURE — 1090F PRES/ABSN URINE INCON ASSESS: CPT | Performed by: NURSE PRACTITIONER

## 2024-01-09 PROCEDURE — G8484 FLU IMMUNIZE NO ADMIN: HCPCS | Performed by: NURSE PRACTITIONER

## 2024-01-09 PROCEDURE — 3078F DIAST BP <80 MM HG: CPT | Performed by: NURSE PRACTITIONER

## 2024-01-09 PROCEDURE — 1123F ACP DISCUSS/DSCN MKR DOCD: CPT | Performed by: NURSE PRACTITIONER

## 2024-01-09 RX ORDER — HYDROCODONE BITARTRATE AND ACETAMINOPHEN 5; 325 MG/1; MG/1
0.5 TABLET ORAL 2 TIMES DAILY PRN
Qty: 30 TABLET | Refills: 0 | Status: SHIPPED | OUTPATIENT
Start: 2024-01-09 | End: 2024-02-08

## 2024-01-09 ASSESSMENT — ENCOUNTER SYMPTOMS
NAUSEA: 0
COLOR CHANGE: 0
BLOOD IN STOOL: 0
WHEEZING: 0
EYE PAIN: 0
TROUBLE SWALLOWING: 0
ABDOMINAL PAIN: 0
DIARRHEA: 0
CONSTIPATION: 0
COUGH: 1
VOMITING: 0
EYE DISCHARGE: 0
BACK PAIN: 0
SHORTNESS OF BREATH: 0

## 2024-01-09 NOTE — PROGRESS NOTES
rainer as ordered.     3. Pseudobulbar affect  4. Emotional lability  5. Depression  Mood improved. Ongoing issues with crying due to pseudobulbar affect. Recommended following up with neurology and possibly being restarted on Nuedexta. Weaned off fluoxetine.    6. Diabetic peripheral neuropathy  7. Pain in both hands  Well-controlled. Will continue norco 5/325 mg po 1/2 tab but decrease frequency to BID prn as patient not needing everyday. Patient tolerating this medication well with good relief of symptoms and no ADRs.    Pt is tolerating current pain meds without adverse effects or over sedation. Lowest effective dose used.  Pt is able to maintain adequate functional level and participate in ADLs  OARRS reviewed. There is no indication of aberrant behavior  Pt advised to call for increasing or uncontrolled pain.  Risk vs benefit assessed.    8. Hx. Of GI bleed  No issues related to this. Continue Protonix 40 mg po daily. No GERD symptoms.     9. Stage 4 CKD (HCC)  Stable disease process. Last GFR at 21.7 mL/min. Avoid nephrotoxic agents.    10. Palliative care encounter  Discussed symptom management related to chronic disease/condition. Provided emotional support and active listening. Patient understands and is agreeable to current plan.         Due to acuity, symptomatology and high-risk medication management, I advised patient to Return in about 2 months (around 3/9/2024).     MDM: JOHNATHON Mancera - CNP    Collaborating physician: Dr. Balbuena

## 2024-01-13 DIAGNOSIS — Z87.19 HISTORY OF GI BLEED: ICD-10-CM

## 2024-01-14 RX ORDER — PANTOPRAZOLE SODIUM 40 MG/1
TABLET, DELAYED RELEASE ORAL
Qty: 90 TABLET | Refills: 0 | Status: SHIPPED | OUTPATIENT
Start: 2024-01-14

## 2024-01-21 DIAGNOSIS — Z79.4 TYPE 2 DIABETES MELLITUS WITH OTHER SPECIFIED COMPLICATION, WITH LONG-TERM CURRENT USE OF INSULIN (HCC): ICD-10-CM

## 2024-01-21 DIAGNOSIS — E11.69 TYPE 2 DIABETES MELLITUS WITH OTHER SPECIFIED COMPLICATION, WITH LONG-TERM CURRENT USE OF INSULIN (HCC): ICD-10-CM

## 2024-01-22 RX ORDER — INSULIN GLARGINE 100 [IU]/ML
INJECTION, SOLUTION SUBCUTANEOUS
Qty: 40 ML | Refills: 3 | Status: SHIPPED | OUTPATIENT
Start: 2024-01-22

## 2024-01-30 ENCOUNTER — TELEMEDICINE (OUTPATIENT)
Dept: FAMILY MEDICINE CLINIC | Age: 79
End: 2024-01-30
Payer: MEDICARE

## 2024-01-30 DIAGNOSIS — M19.90 CHRONIC ARTHRITIS: Primary | ICD-10-CM

## 2024-01-30 DIAGNOSIS — Z79.4 TYPE 2 DIABETES MELLITUS WITH OTHER SPECIFIED COMPLICATION, WITH LONG-TERM CURRENT USE OF INSULIN (HCC): ICD-10-CM

## 2024-01-30 DIAGNOSIS — E11.69 TYPE 2 DIABETES MELLITUS WITH OTHER SPECIFIED COMPLICATION, WITH LONG-TERM CURRENT USE OF INSULIN (HCC): ICD-10-CM

## 2024-01-30 LAB
HBA1C MFR BLD: 8.8 % (ref 4.8–5.9)
REASON FOR REJECTION: NORMAL
REJECTED TEST: NORMAL

## 2024-01-30 PROCEDURE — 99214 OFFICE O/P EST MOD 30 MIN: CPT | Performed by: FAMILY MEDICINE

## 2024-01-30 PROCEDURE — G8484 FLU IMMUNIZE NO ADMIN: HCPCS | Performed by: FAMILY MEDICINE

## 2024-01-30 PROCEDURE — G8427 DOCREV CUR MEDS BY ELIG CLIN: HCPCS | Performed by: FAMILY MEDICINE

## 2024-01-30 PROCEDURE — G8400 PT W/DXA NO RESULTS DOC: HCPCS | Performed by: FAMILY MEDICINE

## 2024-01-30 PROCEDURE — 1036F TOBACCO NON-USER: CPT | Performed by: FAMILY MEDICINE

## 2024-01-30 PROCEDURE — G8417 CALC BMI ABV UP PARAM F/U: HCPCS | Performed by: FAMILY MEDICINE

## 2024-01-30 PROCEDURE — 1090F PRES/ABSN URINE INCON ASSESS: CPT | Performed by: FAMILY MEDICINE

## 2024-01-30 PROCEDURE — 1123F ACP DISCUSS/DSCN MKR DOCD: CPT | Performed by: FAMILY MEDICINE

## 2024-01-30 RX ORDER — GABAPENTIN 300 MG/1
300 CAPSULE ORAL 2 TIMES DAILY
Qty: 60 CAPSULE | Refills: 1 | Status: SHIPPED | OUTPATIENT
Start: 2024-01-30 | End: 2024-03-30

## 2024-01-30 ASSESSMENT — ENCOUNTER SYMPTOMS
ANAL BLEEDING: 0
CHEST TIGHTNESS: 0
SHORTNESS OF BREATH: 0
EYE PAIN: 0
FACIAL SWELLING: 0

## 2024-01-30 NOTE — PROGRESS NOTES
2024    TELEHEALTH EVALUATION -- Audio/Visual (During COVID-19 public health emergency)    HPI:    Aimee Villanueva (:  1945) has requested an audio/video evaluation for the following concern(s):    Chronic pain  Patient has a history of chronic pain.  More recently she has been having pain in her right hip.  Also she has been complaining of numbness and tingling in the hands and shoulders.  Patient has a known history of fibromyalgia, osteoarthritis that is being managed by palliative medicine.  Patient is currently on 1/2 tablet of Norco twice a day as needed for pain.  Patient reports that the pain medicine helps but only last about 4 to 5 hours.   is looking for other options that may help patient's pain.  In the past, patient did have steroid injections by pain management.  At that time sugars were well-controlled.  Patient does not want injections or see a pain management specialist at this time.    Review of Systems   Constitutional:  Negative for activity change, diaphoresis and fatigue.   HENT:  Negative for facial swelling and nosebleeds.    Eyes:  Negative for pain.   Respiratory:  Negative for chest tightness and shortness of breath.    Gastrointestinal:  Negative for anal bleeding.   Endocrine: Negative for heat intolerance.   Genitourinary:  Negative for dysuria, frequency and pelvic pain.   Musculoskeletal:  Positive for arthralgias. Negative for myalgias.   Allergic/Immunologic: Negative for immunocompromised state.   Neurological:  Negative for syncope and light-headedness.   Psychiatric/Behavioral:  Negative for behavioral problems and decreased concentration.        Prior to Visit Medications    Medication Sig Taking? Authorizing Provider   gabapentin (NEURONTIN) 300 MG capsule Take 1 capsule by mouth in the morning and at bedtime for 60 days. Yes Ryan Guerrero MD   insulin glargine (LANTUS) 100 UNIT/ML injection vial INJECT 75 UNITS AT BEDTIME, 20 UNITS IN THE MORNING

## 2024-02-01 ENCOUNTER — OFFICE VISIT (OUTPATIENT)
Dept: ENDOCRINOLOGY | Age: 79
End: 2024-02-01
Payer: MEDICARE

## 2024-02-01 VITALS — HEIGHT: 59 IN | BODY MASS INDEX: 29.27 KG/M2

## 2024-02-01 DIAGNOSIS — Z79.4 TYPE 2 DIABETES MELLITUS WITH OTHER SPECIFIED COMPLICATION, WITH LONG-TERM CURRENT USE OF INSULIN (HCC): Primary | ICD-10-CM

## 2024-02-01 DIAGNOSIS — E11.65 UNCONTROLLED TYPE 2 DIABETES MELLITUS WITH HYPERGLYCEMIA (HCC): ICD-10-CM

## 2024-02-01 DIAGNOSIS — E11.69 TYPE 2 DIABETES MELLITUS WITH OTHER SPECIFIED COMPLICATION, WITH LONG-TERM CURRENT USE OF INSULIN (HCC): Primary | ICD-10-CM

## 2024-02-01 LAB
ALBUMIN SERPL-MCNC: 4.4 G/DL (ref 3.5–4.6)
ALP SERPL-CCNC: 118 U/L (ref 40–130)
ALT SERPL-CCNC: 17 U/L (ref 0–33)
ANION GAP SERPL CALCULATED.3IONS-SCNC: 16 MEQ/L (ref 9–15)
AST SERPL-CCNC: 21 U/L (ref 0–35)
BILIRUB SERPL-MCNC: 0.4 MG/DL (ref 0.2–0.7)
BUN SERPL-MCNC: 29 MG/DL (ref 8–23)
CALCIUM SERPL-MCNC: 10 MG/DL (ref 8.5–9.9)
CHLORIDE SERPL-SCNC: 102 MEQ/L (ref 95–107)
CHOLEST SERPL-MCNC: 123 MG/DL (ref 0–199)
CO2 SERPL-SCNC: 21 MEQ/L (ref 20–31)
CREAT SERPL-MCNC: 2.1 MG/DL (ref 0.5–0.9)
GLOBULIN SER CALC-MCNC: 3.2 G/DL (ref 2.3–3.5)
GLUCOSE SERPL-MCNC: 216 MG/DL (ref 70–99)
HDLC SERPL-MCNC: 39 MG/DL (ref 40–59)
LDLC SERPL CALC-MCNC: 51 MG/DL (ref 0–129)
POTASSIUM SERPL-SCNC: 4.8 MEQ/L (ref 3.4–4.9)
PROT SERPL-MCNC: 7.6 G/DL (ref 6.3–8)
SODIUM SERPL-SCNC: 139 MEQ/L (ref 135–144)
TRIGL SERPL-MCNC: 163 MG/DL (ref 0–150)

## 2024-02-01 PROCEDURE — G8484 FLU IMMUNIZE NO ADMIN: HCPCS | Performed by: PHYSICIAN ASSISTANT

## 2024-02-01 PROCEDURE — 99214 OFFICE O/P EST MOD 30 MIN: CPT | Performed by: PHYSICIAN ASSISTANT

## 2024-02-01 PROCEDURE — G8427 DOCREV CUR MEDS BY ELIG CLIN: HCPCS | Performed by: PHYSICIAN ASSISTANT

## 2024-02-01 PROCEDURE — 1036F TOBACCO NON-USER: CPT | Performed by: PHYSICIAN ASSISTANT

## 2024-02-01 PROCEDURE — G8417 CALC BMI ABV UP PARAM F/U: HCPCS | Performed by: PHYSICIAN ASSISTANT

## 2024-02-01 PROCEDURE — 3052F HG A1C>EQUAL 8.0%<EQUAL 9.0%: CPT | Performed by: PHYSICIAN ASSISTANT

## 2024-02-01 PROCEDURE — G8400 PT W/DXA NO RESULTS DOC: HCPCS | Performed by: PHYSICIAN ASSISTANT

## 2024-02-01 PROCEDURE — 1090F PRES/ABSN URINE INCON ASSESS: CPT | Performed by: PHYSICIAN ASSISTANT

## 2024-02-01 PROCEDURE — 1123F ACP DISCUSS/DSCN MKR DOCD: CPT | Performed by: PHYSICIAN ASSISTANT

## 2024-02-01 RX ORDER — INSULIN GLARGINE 100 [IU]/ML
INJECTION, SOLUTION SUBCUTANEOUS
Qty: 10 ADJUSTABLE DOSE PRE-FILLED PEN SYRINGE | Refills: 3 | Status: SHIPPED | OUTPATIENT
Start: 2024-02-01

## 2024-02-01 RX ORDER — LANCETS
EACH MISCELLANEOUS
Qty: 100 EACH | Refills: 3 | Status: SHIPPED | OUTPATIENT
Start: 2024-02-01

## 2024-02-01 RX ORDER — BLOOD SUGAR DIAGNOSTIC
STRIP MISCELLANEOUS
Qty: 100 EACH | Refills: 3 | Status: SHIPPED | OUTPATIENT
Start: 2024-02-01

## 2024-02-01 RX ORDER — INSULIN LISPRO 100 [IU]/ML
INJECTION, SOLUTION INTRAVENOUS; SUBCUTANEOUS
Qty: 30 ML | Refills: 3 | Status: CANCELLED | OUTPATIENT
Start: 2024-02-01

## 2024-02-01 RX ORDER — PEN NEEDLE, DIABETIC 30 GX3/16"
1 NEEDLE, DISPOSABLE MISCELLANEOUS 2 TIMES DAILY
Qty: 100 EACH | Refills: 3 | Status: SHIPPED | OUTPATIENT
Start: 2024-02-01

## 2024-02-01 RX ORDER — INSULIN LISPRO 100 [IU]/ML
INJECTION, SOLUTION INTRAVENOUS; SUBCUTANEOUS
Qty: 30 ML | Refills: 3 | Status: SHIPPED | OUTPATIENT
Start: 2024-02-01

## 2024-02-01 ASSESSMENT — ENCOUNTER SYMPTOMS
SHORTNESS OF BREATH: 0
VOMITING: 0
COUGH: 0
SORE THROAT: 0
RHINORRHEA: 0
NAUSEA: 0
WHEEZING: 0
ABDOMINAL PAIN: 0
EYE PAIN: 0
DIARRHEA: 0
EYE REDNESS: 0
SINUS PRESSURE: 0

## 2024-02-01 NOTE — PATIENT INSTRUCTIONS
blood glucose to verify.  If there is a large discrepancy, use the blood glucose number and treat the number accordingly.  You can calibrate the device by going into the settings, and inputting your blood glucose levels.  Sometimes technical issues can arrive, the sensor may be bent or come out from under the skin, the transmitter may not function.  If this should happen usual your regular glucometer and test your blood until you can place a new sensor and transmitter on your skin.

## 2024-02-01 NOTE — PROGRESS NOTES
EGD ESOPHAGOGASTRODUODENOSCOPY performed by Joselito Senior MD at [facility]    OR LAPS ABD PRTM&OMENTUM DX W/WO SPEC BR/WA SPX N/A 02/08/2018    negative findings    OR NEUROPLASTY &/TRANSPOS MEDIAN NRV CARPAL TUNNE Left 06/05/2017    LEFT  CARPAL TUNNEL RELEASE [place] [facility]    OR RPR UMBILICAL HRNA 5 YRS/> REDUCIBLE N/A 02/08/2018    TONSILLECTOMY      as child    TUNNELED VENOUS PORT PLACEMENT Right 05/14/2021    8 Fr [employer] Port ClearVUE by Dr. Ford    UPPER GASTROINTESTINAL ENDOSCOPY  08/26/2016    w/bx     UPPER GASTROINTESTINAL ENDOSCOPY N/A 02/25/2020    EGD possible biopsy performed by [place] Regalado [place] [facility]    UPPER GASTROINTESTINAL ENDOSCOPY N/A 07/01/2020    EGD PUSH ENTEROSCOPY performed by Rm Adams MD at [facility]     Social History     Socioeconomic History    Marital status:      Spouse name: Librado    Number of children: 2    Years of education: 12    Highest education level: High school graduate   Occupational History    Occupation: Retired-   Tobacco Use    Smoking status: Former     Current packs/day: 0.00     Average packs/day: 1 pack/day for 59.0 years (59.0 ttl pk-yrs)     Types: Cigarettes     Start date: 11/30/2017     Quit date: 10/1/2020     Years since quitting: 3.3    Smokeless tobacco: Never   Vaping Use    Vaping Use: Never used   Substance and Sexual Activity    Alcohol use: Not Currently    Drug use: No    Sexual activity: Yes     Partners: Male   Other Topics Concern    Not on file   Social History Narrative    Grew up in [place]     Lives With:  Librado Spouse    Type of Home: House    Home Layout: Two level, Performs ADL's on one level    Home Access: Stairs to enter with rails    Entrance Stairs - Number of Steps: 4    Bathroom Shower/Tub: Tub/Shower unit, Doors    Bathroom Equipment: Shower chair, Grab bars in shower    Home Equipment: Rolling walker, Cane, Oxygen(uses her O2 very

## 2024-02-05 DIAGNOSIS — Z95.828 PORT-A-CATH IN PLACE: Primary | ICD-10-CM

## 2024-02-06 ENCOUNTER — TELEPHONE (OUTPATIENT)
Dept: INTERVENTIONAL RADIOLOGY/VASCULAR | Age: 79
End: 2024-02-06

## 2024-02-06 NOTE — TELEPHONE ENCOUNTER
Patient was given this information via phone conversation - voiced understanding  2.  Spoke to Joie in Specials to schedule procedure    >  YOUR PROCEDURE IS SCHEDULED ON: 2/9/2024 @ 11:30 am   >  You will need to arrive at 11:00 am from home and check in at the Diagnostic Imaging Check In desk.

## 2024-02-07 ENCOUNTER — TELEPHONE (OUTPATIENT)
Dept: INTERVENTIONAL RADIOLOGY/VASCULAR | Age: 79
End: 2024-02-07

## 2024-02-07 NOTE — TELEPHONE ENCOUNTER
Patient's , Delano, was given this information via phone conversation - voiced understanding  2.  Spoke to Joie in Specials to schedule procedure    >  YOUR PROCEDURE IS SCHEDULED ON: 2/13/2024 @ 3:00 pm   >  You will need to arrive at 2:30 pm from home and check in at the Diagnostic Imaging Check In desk.

## 2024-02-08 ENCOUNTER — OFFICE VISIT (OUTPATIENT)
Dept: ORTHOPEDIC SURGERY | Age: 79
End: 2024-02-08
Payer: MEDICARE

## 2024-02-08 ENCOUNTER — HOSPITAL ENCOUNTER (OUTPATIENT)
Dept: ORTHOPEDIC SURGERY | Age: 79
Discharge: HOME | End: 2024-02-10
Payer: MEDICARE

## 2024-02-08 VITALS — HEIGHT: 59 IN | WEIGHT: 145 LBS | BODY MASS INDEX: 29.23 KG/M2

## 2024-02-08 DIAGNOSIS — M16.11 ARTHRITIS OF RIGHT HIP: ICD-10-CM

## 2024-02-08 DIAGNOSIS — M16.11 ARTHRITIS OF RIGHT HIP: Primary | ICD-10-CM

## 2024-02-08 PROCEDURE — 1036F TOBACCO NON-USER: CPT | Performed by: PHYSICIAN ASSISTANT

## 2024-02-08 PROCEDURE — 1090F PRES/ABSN URINE INCON ASSESS: CPT | Performed by: PHYSICIAN ASSISTANT

## 2024-02-08 PROCEDURE — G8400 PT W/DXA NO RESULTS DOC: HCPCS | Performed by: PHYSICIAN ASSISTANT

## 2024-02-08 PROCEDURE — 1123F ACP DISCUSS/DSCN MKR DOCD: CPT | Performed by: PHYSICIAN ASSISTANT

## 2024-02-08 PROCEDURE — 73502 X-RAY EXAM HIP UNI 2-3 VIEWS: CPT

## 2024-02-08 PROCEDURE — G8484 FLU IMMUNIZE NO ADMIN: HCPCS | Performed by: PHYSICIAN ASSISTANT

## 2024-02-08 PROCEDURE — G8417 CALC BMI ABV UP PARAM F/U: HCPCS | Performed by: PHYSICIAN ASSISTANT

## 2024-02-08 PROCEDURE — G8427 DOCREV CUR MEDS BY ELIG CLIN: HCPCS | Performed by: PHYSICIAN ASSISTANT

## 2024-02-08 PROCEDURE — 99214 OFFICE O/P EST MOD 30 MIN: CPT | Performed by: PHYSICIAN ASSISTANT

## 2024-02-08 RX ORDER — LIDOCAINE 4 G/G
1 PATCH TOPICAL DAILY
Qty: 30 PATCH | Refills: 0 | Status: SHIPPED | OUTPATIENT
Start: 2024-02-08 | End: 2024-03-09

## 2024-02-08 NOTE — PROGRESS NOTES
MG tablet TAKE 1 TABLET BY MOUTH ONCE DAILY IN THE MORNING BEFORE BREAKFAST 90 tablet 0    digoxin (LANOXIN) 125 MCG tablet TAKE 1 TABLET BY MOUTH EVERY OTHER DAY 45 tablet 3    Lancets MISC Pt test 2x daily 50 each 3    Continuous Blood Gluc Sensor (DEXCOM G7 SENSOR) MISC 1 Device by Does not apply route every 10 days 12 each 10    Continuous Blood Gluc  (DEXCOM G7 ) [place] 1 Device by Does not apply route daily 1 each 0    amLODIPine (NORVASC) 5 MG tablet Take 1 tablet by mouth daily 90 tablet 3    nitrofurantoin (MACRODANTIN) 50 MG capsule Take 1 capsule by mouth daily      rosuvastatin (CRESTOR) 40 MG tablet Take 1 tablet by mouth nightly 90 tablet 0    mometasone (ELOCON) 0.1 % cream Daily as needed to bilateral external ears. 15 g 1    aspirin 81 MG EC tablet Take 1 tablet by mouth daily 90 tablet 3    carvedilol (COREG) 25 MG tablet TAKE 1 & 1/2 (ONE & ONE-HALF) TABLETS BY MOUTH TWICE DAILY WITH MEALS 270 tablet 3    lidocaine-prilocaine (EMLA) 2.5-2.5 % cream APPLY CREAM TOPICALLY TO LEFT ARM AND CHEST WALL PAIN 5 g 0    Respiratory Therapy Supplies (NEBULIZER/TUBING/MOUTHPIECE) KIT 1 kit by Does not apply route as needed (SOB/wheezing) 1 kit 0    glucose 4 g chewable tablet Take 4 tablets by mouth as needed for Low blood sugar 60 tablet 3    blood glucose monitor kit and supplies Give 1 meter covered by insurance 1 kit 0    Handicap Placard MISC by Does not apply route Good from 10/6/22 till 10/6/27 1 each 0    Glucagon (GVOKE HYPOPEN 2-PACK) 1 MG/0.2ML SOAJ Inject 1 mg into the skin every 15 minutes as needed (glucose less tahtn 70 or if symptomatic) 2 each 3    albuterol-ipratropium (COMBIVENT RESPIMAT)  MCG/ACT AERS inhaler Inhale 1 puff into the lungs every 6 hours as needed for Wheezing or Shortness of Breath 4 g 5    ipratropium-albuterol (DUONEB) 0.5-2.5 (3) MG/3ML SOLN nebulizer solution Inhale 3 mLs into the lungs 3 times daily (Patient taking differently: Inhale 3 mLs into the

## 2024-02-12 ENCOUNTER — PRE-PROCEDURE TELEPHONE (OUTPATIENT)
Dept: INTERVENTIONAL RADIOLOGY/VASCULAR | Age: 79
End: 2024-02-12

## 2024-02-12 NOTE — FLOWSHEET NOTE
Pre-procedure called placed re: upcoming IR appointment. No answer, voicemail message left with the following information:     >  YOUR PROCEDURE IS SCHEDULED ON: 2/13/2024 @ 3:00 pm   >  You will need to arrive at 2:30 pm from home and check in at the Diagnostic Imaging Check In desk.     Radiology phone # left to call back with any questions / concerns. Electronically signed by Susi Erickson RN on 2/12/2024 at 10:00 AM

## 2024-02-22 ENCOUNTER — TELEPHONE (OUTPATIENT)
Dept: PALLATIVE CARE | Age: 79
End: 2024-02-22

## 2024-02-22 DIAGNOSIS — M54.50 ACUTE BILATERAL LOW BACK PAIN, UNSPECIFIED WHETHER SCIATICA PRESENT: Primary | ICD-10-CM

## 2024-02-22 NOTE — TELEPHONE ENCOUNTER
Spoke to patient spouse to see what type of symptoms the patient is experiencing. He states she has lower back pain that's been consistent for a few weeks. They did reach out to the pcp to get a prescription and and xray. The xray shown inflammation of the right hip. Patient  can't identify if the patients urine has any foul smell or discoloration. He's requesting the order for her urine to put her at ease because she is miserable with this lower back pain they just want to rule out any bladder/kidney infections.

## 2024-02-22 NOTE — TELEPHONE ENCOUNTER
Will order a UA with reflex to culture, however, if the pain moves up to the mid back or patient develops fevers, chills, N & V, or new confusion go to ER right away.

## 2024-02-23 DIAGNOSIS — M54.50 ACUTE BILATERAL LOW BACK PAIN, UNSPECIFIED WHETHER SCIATICA PRESENT: ICD-10-CM

## 2024-02-23 LAB
BACTERIA URNS QL MICRO: ABNORMAL /HPF
BILIRUB UR QL STRIP: NEGATIVE
CLARITY UR: ABNORMAL
COLOR UR: YELLOW
EPI CELLS #/AREA URNS AUTO: ABNORMAL /HPF (ref 0–5)
GLUCOSE UR STRIP-MCNC: 250 MG/DL
HGB UR QL STRIP: ABNORMAL
HYALINE CASTS #/AREA URNS AUTO: ABNORMAL /HPF (ref 0–5)
KETONES UR STRIP-MCNC: NEGATIVE MG/DL
LEUKOCYTE ESTERASE UR QL STRIP: ABNORMAL
NITRITE UR QL STRIP: NEGATIVE
PH UR STRIP: 5 [PH] (ref 5–9)
PROT UR STRIP-MCNC: >=300 MG/DL
RBC #/AREA URNS HPF: ABNORMAL /HPF (ref 0–2)
SP GR UR STRIP: 1.02 (ref 1–1.03)
URINE REFLEX TO CULTURE: YES
UROBILINOGEN UR STRIP-ACNC: 0.2 E.U./DL
WBC #/AREA URNS AUTO: ABNORMAL /HPF (ref 0–5)

## 2024-02-25 LAB — BACTERIA UR CULT: NORMAL

## 2024-02-29 ENCOUNTER — SCHEDULED TELEPHONE ENCOUNTER (OUTPATIENT)
Dept: ORTHOPEDIC SURGERY | Age: 79
End: 2024-02-29
Payer: MEDICARE

## 2024-02-29 DIAGNOSIS — M48.062 SPINAL STENOSIS OF LUMBAR REGION WITH NEUROGENIC CLAUDICATION: Primary | Chronic | ICD-10-CM

## 2024-02-29 PROCEDURE — 99442 PR PHYS/QHP TELEPHONE EVALUATION 11-20 MIN: CPT | Performed by: PHYSICIAN ASSISTANT

## 2024-02-29 RX ORDER — LIDOCAINE 4 G/G
1 PATCH TOPICAL DAILY
Qty: 30 PATCH | Refills: 0 | Status: SHIPPED | OUTPATIENT
Start: 2024-02-29 | End: 2024-03-30

## 2024-02-29 RX ORDER — HYDROCODONE BITARTRATE AND ACETAMINOPHEN 5; 325 MG/1; MG/1
1 TABLET ORAL EVERY 6 HOURS PRN
Qty: 20 TABLET | Refills: 0 | Status: SHIPPED | OUTPATIENT
Start: 2024-02-29 | End: 2024-03-05

## 2024-02-29 NOTE — PROGRESS NOTES
Aimee Villanueva is a 78 y.o. female evaluated via telephone on 2/29/2024 for Pain    Documentation:  I spoke to the patient and her caregiver over the phone today for follow-up for her right lower back pain extends down to the leg.  This runs down the lateral aspect to the knee, it spares the foot and calf.  This been bothering for quite some time.  We gave her lidocaine patches over the course last month that did not provide her any relief.  She has tried anti-inflammatories which did not provide her any relief in the past.  We discussed that from an orthopedic standpoint her neck step would be an MRI to help guide potential steroid injections but she has uncontrolled diabetes, her average blood sugar is 300-400.  We discussed that steroids could induce an hypoglycemic event and that it would be unsafe at this time to proceed with such.  We will be happy to assist further modalities of treatment once his blood sugar ranges between 100-200 but until then we will need to can you continue with conservative modalities of treatment.  I will refill her lidocaine patches today.  I will give her a small batch of Norco to help with pain at night as she is having difficulty sleeping.  We discussed adverse reactions this medication and that I will not refill this medication.  For further management I will refer her to pain management to discuss potential blocking agents that could give her some relief in her back.  Again we will be happy to see her back once her comorbid conditions are under control.    Total Time: minutes: 11-20 minutes    Aimee Villanueva was evaluated through a synchronous (real-time) audio encounter. Patient identification was verified at the start of the visit. She (or guardian if applicable) is aware that this is a billable service, which includes applicable co-pays. This visit was conducted with the patient's (and/or legal guardian's) verbal consent. She has not had a related appointment within my  May 20, 2021      Ritesh Jerry  [address]    [place] [address]        Dear Ritesh,    I care about your health and have reviewed your health plan. I have reviewed your medical conditions, medication list, and lab results and am making recommendations based on this review, to better manage your health.    You are in particular need of attention regarding:  -Asthma  -Wellness (Physical) Visit     I am recommending that you:  -schedule a WELLNESS (Physical) APPOINTMENT with me.   I will check fasting labs the same day - nothing to eat except water and meds for 8-10 hours prior.    Here is a list of Health Maintenance topics that are due now or due soon:  Health Maintenance Due   Topic Date Due     ANNUAL REVIEW OF HM ORDERS  Never done     Asthma Control Test  Never done     Osteoporosis Screening  Never done     Annual Wellness Visit  Never done     Asthma Action Plan - yearly  11/28/2013     Zoster (Shingles) Vaccine (2 of 3) 01/26/2017     FALL RISK ASSESSMENT  02/08/2017       Please call us at [phone] (or use Soonr) to address the above recommendations.     Thank you for trusting [facility] and we appreciate the opportunity to serve you.  We look forward to supporting your healthcare needs in the future.    Healthy Regards,    Umesh Osorio MD

## 2024-03-04 ENCOUNTER — TELEPHONE (OUTPATIENT)
Dept: CARDIOLOGY CLINIC | Age: 79
End: 2024-03-04

## 2024-03-04 NOTE — TELEPHONE ENCOUNTER
Appointment canceled for Aimee Villanueva ([id number])   Visit Type: OFFICE VISIT   Date        Time      Length    Provider                  Department   3/5/2024     2:00 PM  15 mins.  Dr. Tod Blair MD        [facility] CARDIOLOGY      Reason for Cancellation: Patient preference     Appointment Scheduled  (Newest Message First)  View All Conversations on this Encounter  Aimee M Rich \"Dolly\"  P [facility] Cardiology Front Desk1 hour ago (8:35 AM)       Appointment canceled for Aimee Villanueva ([id number])  Visit Type: OFFICE VISIT  Date        Time      Length    Provider                  Department  3/5/2024     2:00 PM  15 mins.  Dr. Tod Blair MD        [facility] CARDIOLOGY     Reason for Cancellation: Patient preference       Bibi, Jossie Job User  Aimee Villanueva  2 days ago     BC  Appointment confirmed automatically   Outgoing contact (External Vendor)     MD Aimee Mazariegos \"Dolly\"2 days ago       Aimee Villanueva,     We're looking forward to seeing you at your upcoming appointment on 3/05/24 at 2:00 PM.     Now you can save time and skip the clipboard! Visit Pre-Check lets you complete your appointment paperwork and pay your copay in advance of your appointment. Then, when you arrive for your appointment, simply stop at the  to let them know you're there.      Please complete your Visit Pre-Check before you arrive.     [email or web address][facility] to view more details about your appointment and complete your Visit Pre-Check.          This trueAnthem message has not been read.     BibiJossie ashraf Job User  Aimee Villanueva M3 months ago     Mychart, Processor  Aimee M Rich \"Dolly\"3 months ago     PM  Appointment Information:      Visit Type: Office Visit          Date: 3/5/2024                  Dept: [facility] Cardiology                  Provider: TOD BLAIR                  Time: 2:00 PM                  Length: 15 min     Appt Status: Scheduled

## 2024-03-06 ENCOUNTER — OFFICE VISIT (OUTPATIENT)
Dept: PALLATIVE CARE | Age: 79
End: 2024-03-06
Payer: MEDICARE

## 2024-03-06 VITALS — HEART RATE: 67 BPM | OXYGEN SATURATION: 97 % | SYSTOLIC BLOOD PRESSURE: 138 MMHG | DIASTOLIC BLOOD PRESSURE: 62 MMHG

## 2024-03-06 DIAGNOSIS — N39.0 RECURRENT UTI: ICD-10-CM

## 2024-03-06 DIAGNOSIS — F48.2 PSEUDOBULBAR AFFECT: ICD-10-CM

## 2024-03-06 DIAGNOSIS — Z87.19 HISTORY OF GI BLEED: ICD-10-CM

## 2024-03-06 DIAGNOSIS — Z51.5 PALLIATIVE CARE ENCOUNTER: ICD-10-CM

## 2024-03-06 DIAGNOSIS — M54.41 ACUTE RIGHT-SIDED LOW BACK PAIN WITH RIGHT-SIDED SCIATICA: Primary | ICD-10-CM

## 2024-03-06 DIAGNOSIS — M79.641 PAIN IN BOTH HANDS: ICD-10-CM

## 2024-03-06 DIAGNOSIS — I50.42 CHRONIC COMBINED SYSTOLIC AND DIASTOLIC CHF (CONGESTIVE HEART FAILURE) (HCC): ICD-10-CM

## 2024-03-06 DIAGNOSIS — F32.A DEPRESSION, UNSPECIFIED DEPRESSION TYPE: ICD-10-CM

## 2024-03-06 DIAGNOSIS — M79.642 PAIN IN BOTH HANDS: ICD-10-CM

## 2024-03-06 DIAGNOSIS — T40.2X5A CONSTIPATION DUE TO OPIOID THERAPY: ICD-10-CM

## 2024-03-06 DIAGNOSIS — K59.03 CONSTIPATION DUE TO OPIOID THERAPY: ICD-10-CM

## 2024-03-06 DIAGNOSIS — R45.86 EMOTIONAL LABILITY: ICD-10-CM

## 2024-03-06 DIAGNOSIS — E11.42 DIABETIC PERIPHERAL NEUROPATHY (HCC): ICD-10-CM

## 2024-03-06 DIAGNOSIS — J44.9 CHRONIC OBSTRUCTIVE PULMONARY DISEASE, UNSPECIFIED COPD TYPE (HCC): ICD-10-CM

## 2024-03-06 PROBLEM — R56.9 SEIZURES (HCC): Status: RESOLVED | Noted: 2021-11-03 | Resolved: 2024-03-06

## 2024-03-06 PROCEDURE — 1123F ACP DISCUSS/DSCN MKR DOCD: CPT | Performed by: NURSE PRACTITIONER

## 2024-03-06 PROCEDURE — 99349 HOME/RES VST EST MOD MDM 40: CPT | Performed by: NURSE PRACTITIONER

## 2024-03-06 PROCEDURE — 3052F HG A1C>EQUAL 8.0%<EQUAL 9.0%: CPT | Performed by: NURSE PRACTITIONER

## 2024-03-06 PROCEDURE — 3078F DIAST BP <80 MM HG: CPT | Performed by: NURSE PRACTITIONER

## 2024-03-06 PROCEDURE — 3075F SYST BP GE 130 - 139MM HG: CPT | Performed by: NURSE PRACTITIONER

## 2024-03-06 RX ORDER — HYDROCODONE BITARTRATE AND ACETAMINOPHEN 7.5; 325 MG/1; MG/1
1 TABLET ORAL EVERY 6 HOURS PRN
Qty: 56 TABLET | Refills: 0 | Status: SHIPPED | OUTPATIENT
Start: 2024-03-06 | End: 2024-03-20

## 2024-03-06 RX ORDER — NITROFURANTOIN MACROCRYSTALS 50 MG/1
50 CAPSULE ORAL DAILY
Qty: 30 CAPSULE | Refills: 0 | Status: CANCELLED | OUTPATIENT
Start: 2024-03-06 | End: 2024-04-05

## 2024-03-06 RX ORDER — HYDROCODONE BITARTRATE AND ACETAMINOPHEN 7.5; 325 MG/1; MG/1
1 TABLET ORAL EVERY 8 HOURS PRN
Refills: 0 | Status: CANCELLED | OUTPATIENT
Start: 2024-03-06 | End: 2024-03-20

## 2024-03-06 RX ORDER — SENNA AND DOCUSATE SODIUM 50; 8.6 MG/1; MG/1
2 TABLET, FILM COATED ORAL DAILY
Qty: 120 TABLET | Refills: 0 | Status: SHIPPED | OUTPATIENT
Start: 2024-03-06

## 2024-03-06 NOTE — PROGRESS NOTES
obstructive pulmonary disease, unspecified COPD type (HCC)  Stable. Continue COPD directed therapies. Call for increased SOB, cough, sputum production, excessive fatigue, fever, chills, or myalgias. Activity as tolerated.     Continue combivent and duonebs as ordered.     3. Pseudobulbar affect  4. Emotional lability  5. Depression  Patient tearful in the setting of severe pain.    6. Diabetic peripheral neuropathy  7. Pain in both hands  Well-controlled at this time.     8. Hx. Of GI bleed  No issues related to this. Continue Protonix 40 mg po daily. No GERD symptoms.     9. Acute right-sided low back pain with right-sided sciatica  Back pain is severe and not controlled. Increase norco to 7.5/325 mg po q 6 hours prn. Do not take additional medication or adjust dose without direction of palliative care. See HPI. Follow-up with pain management as scheduled.     10. Constipation due to opioid therapy  Start senokot-s 2 tablets daily.    11. Recurrent UTI  Patient has been on macrobid over a year. Discussed risks of long-term use. Would like patient to follow-up with urology to see if they wish to continue this medication. No current s/s of UTI.    12. Palliative care encounter  Discussed symptom management related to chronic disease/condition. Provided emotional support and active listening. Patient understands and is agreeable to current plan.         Due to acuity, symptomatology and high-risk medication management, I advised patient to Return in about 2 weeks (around 3/20/2024).     MDM: Alfred Dumont, APRN - CNP    Collaborating physician: Dr. Balbuena

## 2024-03-11 ASSESSMENT — ENCOUNTER SYMPTOMS
CONSTIPATION: 1
BACK PAIN: 1

## 2024-03-12 ENCOUNTER — INITIAL CONSULT (OUTPATIENT)
Dept: PAIN MANAGEMENT | Age: 79
End: 2024-03-12
Payer: MEDICARE

## 2024-03-12 VITALS
BODY MASS INDEX: 29.43 KG/M2 | TEMPERATURE: 98.4 F | WEIGHT: 146 LBS | HEIGHT: 59 IN | DIASTOLIC BLOOD PRESSURE: 68 MMHG | SYSTOLIC BLOOD PRESSURE: 130 MMHG

## 2024-03-12 DIAGNOSIS — M51.36 DDD (DEGENERATIVE DISC DISEASE), LUMBAR: ICD-10-CM

## 2024-03-12 DIAGNOSIS — M54.50 CHRONIC RIGHT-SIDED LOW BACK PAIN WITHOUT SCIATICA: ICD-10-CM

## 2024-03-12 DIAGNOSIS — G89.29 CHRONIC RIGHT-SIDED LOW BACK PAIN WITHOUT SCIATICA: ICD-10-CM

## 2024-03-12 DIAGNOSIS — M47.816 LUMBAR SPONDYLOSIS: Primary | ICD-10-CM

## 2024-03-12 PROCEDURE — 3078F DIAST BP <80 MM HG: CPT | Performed by: PAIN MEDICINE

## 2024-03-12 PROCEDURE — 1123F ACP DISCUSS/DSCN MKR DOCD: CPT | Performed by: PAIN MEDICINE

## 2024-03-12 PROCEDURE — 3075F SYST BP GE 130 - 139MM HG: CPT | Performed by: PAIN MEDICINE

## 2024-03-12 PROCEDURE — 99204 OFFICE O/P NEW MOD 45 MIN: CPT | Performed by: PAIN MEDICINE

## 2024-03-12 RX ORDER — LIDOCAINE 4 G/G
1 PATCH TOPICAL DAILY
Qty: 30 PATCH | Refills: 0 | Status: SHIPPED | OUTPATIENT
Start: 2024-03-12 | End: 2024-04-11

## 2024-03-12 ASSESSMENT — ENCOUNTER SYMPTOMS
SHORTNESS OF BREATH: 0
BACK PAIN: 1
CONSTIPATION: 1
DIARRHEA: 0
NAUSEA: 0

## 2024-03-13 ENCOUNTER — PROCEDURE VISIT (OUTPATIENT)
Dept: PAIN MANAGEMENT | Age: 79
End: 2024-03-13
Payer: MEDICARE

## 2024-03-13 DIAGNOSIS — M47.816 LUMBAR SPONDYLOSIS: ICD-10-CM

## 2024-03-13 DIAGNOSIS — M54.50 CHRONIC RIGHT-SIDED LOW BACK PAIN WITHOUT SCIATICA: ICD-10-CM

## 2024-03-13 DIAGNOSIS — G89.29 CHRONIC RIGHT-SIDED LOW BACK PAIN WITHOUT SCIATICA: ICD-10-CM

## 2024-03-13 PROCEDURE — 64493 INJ PARAVERT F JNT L/S 1 LEV: CPT | Performed by: PAIN MEDICINE

## 2024-03-13 PROCEDURE — 64494 INJ PARAVERT F JNT L/S 2 LEV: CPT | Performed by: PAIN MEDICINE

## 2024-03-13 RX ORDER — LIDOCAINE HYDROCHLORIDE 10 MG/ML
1.5 INJECTION, SOLUTION EPIDURAL; INFILTRATION; INTRACAUDAL; PERINEURAL ONCE
Status: COMPLETED | OUTPATIENT
Start: 2024-03-13 | End: 2024-03-13

## 2024-03-13 RX ADMIN — LIDOCAINE HYDROCHLORIDE 1.5 ML: 10 INJECTION, SOLUTION EPIDURAL; INFILTRATION; INTRACAUDAL; PERINEURAL at 14:25

## 2024-03-13 NOTE — PROGRESS NOTES
ABG DONE ON CPAP 5 PS 5 35%    PH 7.38  PCO2 43.1  PO2 118.2  HCO3 25.6  BE 0.5  SPO2 98.5% Please notify patient that their cholesterol lab results are abnormal. Even though risk is less than 2 % I still recommend a lipid lowering med. Call if agrees. All other labs are good.     The 10-year ASCVD risk score (Demetri REYNOLDS, et al., 2019) is: 2%    Values used to calculate the score:      Age: 52 years      Sex: Female      Is Non- : No      Diabetic: No      Tobacco smoker: No      Systolic Blood Pressure: 120 mmHg      Is BP treated: No      HDL Cholesterol: 51 mg/dL      Total Cholesterol: 245 mg/dL

## 2024-03-13 NOTE — PROGRESS NOTES
[facility]  [facility]  [address], [address]  [place], [place]  P: [phone]  F: [phone]          LUMBAR/SACRAL MEDIAL BRANCH BLOCK      Provider: DYAN ONTIVEROS MD          Patient Name: Aimee Villanueva : 1945        Date: 3/13/2024       Aimee Villanueva is here today for interventional pain management.      Discussed the risks including but not limited to bleeding, infection, worsened pain, damage to surrounding structures, side effects, toxicity, allergic reactions to medications used, need for surgery, pneumothorax, abdominal and visceral injury, as well as catastrophic injury such as vision loss, paralysis, spinal cord or plexus injury, stroke, bowel or bladder incontinence, chest tube insertion, ventilator dependence, and death. Discussed the risks, benefits, and alternatives to the procedure including no procedure at all. Discussed that we cannot undo any permanent neurologic or orthopaedic damage or change the course of any underlying disease. After thorough discussion, patient expressed understanding and willingness to proceed. Written consent was obtained and is in the chart. Verbal consent to proceed was obtained.    Standard ASI guidelines were followed and sterile technique used.  Area was cleaned with Betadine x3.  Informed consent was obtained.  Fluoroscopic guidance was used for this procedure. Junction of superior articular process and transverse process was identified. For L5 dorsal primary ramus groove of sacral ala and SAP of S1 was identified. Appropriate length spinal needle was used and advanced to correct anatomic location. Negative aspiration was achieved.  At each site approximately 1/2cc of 1% preservative free Lidocaine was injected without difficulty.    Patient tolerated the procedure well, no obvious complications occurred during the procedure.  Patient was appropriately monitored and

## 2024-03-15 DIAGNOSIS — G89.29 CHRONIC RIGHT-SIDED LOW BACK PAIN WITHOUT SCIATICA: ICD-10-CM

## 2024-03-15 DIAGNOSIS — M54.50 CHRONIC RIGHT-SIDED LOW BACK PAIN WITHOUT SCIATICA: ICD-10-CM

## 2024-03-15 DIAGNOSIS — M51.36 DDD (DEGENERATIVE DISC DISEASE), LUMBAR: ICD-10-CM

## 2024-03-15 DIAGNOSIS — M47.816 LUMBAR SPONDYLOSIS: ICD-10-CM

## 2024-03-15 PROCEDURE — 72110 X-RAY EXAM L-2 SPINE 4/>VWS: CPT | Performed by: PAIN MEDICINE

## 2024-03-20 ENCOUNTER — OFFICE VISIT (OUTPATIENT)
Dept: PALLATIVE CARE | Age: 79
End: 2024-03-20
Payer: MEDICARE

## 2024-03-20 VITALS — DIASTOLIC BLOOD PRESSURE: 60 MMHG | HEART RATE: 81 BPM | SYSTOLIC BLOOD PRESSURE: 146 MMHG | OXYGEN SATURATION: 97 %

## 2024-03-20 DIAGNOSIS — I50.42 CHRONIC COMBINED SYSTOLIC AND DIASTOLIC CHF (CONGESTIVE HEART FAILURE) (HCC): ICD-10-CM

## 2024-03-20 DIAGNOSIS — F33.9 EPISODE OF RECURRENT MAJOR DEPRESSIVE DISORDER, UNSPECIFIED DEPRESSION EPISODE SEVERITY (HCC): Primary | ICD-10-CM

## 2024-03-20 DIAGNOSIS — M47.26 OTHER SPONDYLOSIS WITH RADICULOPATHY, LUMBAR REGION: ICD-10-CM

## 2024-03-20 DIAGNOSIS — J44.9 CHRONIC OBSTRUCTIVE PULMONARY DISEASE, UNSPECIFIED COPD TYPE (HCC): ICD-10-CM

## 2024-03-20 DIAGNOSIS — Z51.5 PALLIATIVE CARE ENCOUNTER: ICD-10-CM

## 2024-03-20 DIAGNOSIS — K59.03 CONSTIPATION DUE TO OPIOID THERAPY: ICD-10-CM

## 2024-03-20 DIAGNOSIS — R45.86 EMOTIONAL LABILITY: ICD-10-CM

## 2024-03-20 DIAGNOSIS — F48.2 PSEUDOBULBAR AFFECT: ICD-10-CM

## 2024-03-20 DIAGNOSIS — T40.2X5A CONSTIPATION DUE TO OPIOID THERAPY: ICD-10-CM

## 2024-03-20 PROCEDURE — 1123F ACP DISCUSS/DSCN MKR DOCD: CPT | Performed by: NURSE PRACTITIONER

## 2024-03-20 PROCEDURE — 99349 HOME/RES VST EST MOD MDM 40: CPT | Performed by: NURSE PRACTITIONER

## 2024-03-20 PROCEDURE — 3077F SYST BP >= 140 MM HG: CPT | Performed by: NURSE PRACTITIONER

## 2024-03-20 PROCEDURE — 3078F DIAST BP <80 MM HG: CPT | Performed by: NURSE PRACTITIONER

## 2024-03-20 RX ORDER — HYDROCODONE BITARTRATE AND ACETAMINOPHEN 7.5; 325 MG/1; MG/1
1 TABLET ORAL EVERY 6 HOURS PRN
Qty: 56 TABLET | Refills: 0 | Status: SHIPPED | OUTPATIENT
Start: 2024-03-20 | End: 2024-04-03

## 2024-03-20 RX ORDER — PREGABALIN 25 MG/1
25 CAPSULE ORAL NIGHTLY
Qty: 30 CAPSULE | Refills: 0 | Status: SHIPPED | OUTPATIENT
Start: 2024-03-20 | End: 2024-04-19

## 2024-03-20 NOTE — PROGRESS NOTES
rainer as ordered.     3. Pseudobulbar affect  4. Emotional lability  5. Episode of recurrent MDD (HCC)  Patient reports depression in the setting of current pain. Goal is for pain control to help alleviate depressive symptoms. Per spouse, she is not depressed all the time.     6. Other spondylosis with radiculopathy, lumbar region  Back pain continues to be a significant issue. Patient is having trouble ambulating due to the pain. Continue norco to 7.5/325 mg po q 6 hours prn. Follow-up with pain management as scheduled for medial branch block. Will add lyrica 25 mg po nightly.     Pt is tolerating current pain meds without over sedation. Lowest effective dose used. Pt advised to call and notify palliative care for any adverse effects or sedation  Pt is able to maintain adequate functional level and participate in ADLs  OARRS reviewed. There is no indication of aberrant behavior  Pt advised to call for increasing or uncontrolled pain.  Risk vs benefit assessed.  Pt advised to take only as prescribed and not to change frequency of pain meds without consulting palliative care first.  Reviewed side effects of narcotics.    7. Constipation due to opioid therapy  Continue senokot-s 2 tablets daily.    8. Palliative care encounter  Discussed symptom management related to chronic disease/condition. Provided emotional support and active listening. Patient understands and is agreeable to current plan.         Due to acuity, symptomatology and high-risk medication management, I advised patient to Return in about 3 weeks (around 4/10/2024).     MDM: Alfred Dumont, APRN - CNP    Collaborating physician: Dr. Balbuena

## 2024-03-27 ENCOUNTER — PROCEDURE VISIT (OUTPATIENT)
Dept: PAIN MANAGEMENT | Age: 79
End: 2024-03-27
Payer: MEDICARE

## 2024-03-27 DIAGNOSIS — M51.26 LUMBAR HERNIATED DISC: ICD-10-CM

## 2024-03-27 DIAGNOSIS — M54.16 LUMBAR RADICULOPATHY: Primary | ICD-10-CM

## 2024-03-27 DIAGNOSIS — G89.29 CHRONIC RIGHT-SIDED LOW BACK PAIN WITHOUT SCIATICA: ICD-10-CM

## 2024-03-27 DIAGNOSIS — M47.816 LUMBAR SPONDYLOSIS: ICD-10-CM

## 2024-03-27 DIAGNOSIS — M54.50 CHRONIC RIGHT-SIDED LOW BACK PAIN WITHOUT SCIATICA: ICD-10-CM

## 2024-03-27 PROCEDURE — 64493 INJ PARAVERT F JNT L/S 1 LEV: CPT | Performed by: PAIN MEDICINE

## 2024-03-27 PROCEDURE — 64494 INJ PARAVERT F JNT L/S 2 LEV: CPT | Performed by: PAIN MEDICINE

## 2024-03-27 NOTE — PROGRESS NOTES
discharged home in stable condition with their usual motor strength. The patient will keep close track of pain over the next several hours and call our office tomorrow and let us know what percentage of pain relief is experienced.  Post op Instructions were given to patient.         [] Bilateral [] T12 [] S1      [] L1 [] S2     [x] Right [] L2 [] S3      [x] L3      [] Left [x] L4         [x] L5 [] S.I.               Patient has >80% pain relief following procedure with positive facet joint provocative maneuvers. Schedule RF ablation.     DYAN ONTIVEROS MD

## 2024-03-28 ENCOUNTER — HOSPITAL ENCOUNTER (OUTPATIENT)
Dept: MRI IMAGING | Age: 79
Discharge: HOME | End: 2024-03-30
Payer: MEDICARE

## 2024-03-28 DIAGNOSIS — M51.26 LUMBAR HERNIATED DISC: ICD-10-CM

## 2024-03-28 DIAGNOSIS — M54.16 LUMBAR RADICULOPATHY: ICD-10-CM

## 2024-03-28 PROCEDURE — 72148 MRI LUMBAR SPINE W/O DYE: CPT

## 2024-03-29 ENCOUNTER — TELEPHONE (OUTPATIENT)
Dept: PAIN MANAGEMENT | Age: 79
End: 2024-03-29

## 2024-03-29 NOTE — TELEPHONE ENCOUNTER
She would like the results of her MRI and wants to know what the next step is because she is in a lot of pain, please call.    Scheduled for:  4/10/24 for RIGHT L345 RFA  NO AUTH REQUIRED

## 2024-04-01 ENCOUNTER — TELEPHONE (OUTPATIENT)
Dept: PAIN MANAGEMENT | Age: 79
End: 2024-04-01

## 2024-04-01 NOTE — TELEPHONE ENCOUNTER
RIGHT L345 RFA    NO AUTH REQUIRED    OK to schedule procedure approved as above.   Please note sides/levels approved and date range.   (If applicable, sides/levels approved may differ from those ordered)    TO BE SCHEDULED WITH

## 2024-04-10 ENCOUNTER — OFFICE VISIT (OUTPATIENT)
Dept: PAIN MANAGEMENT | Age: 79
End: 2024-04-10
Payer: MEDICARE

## 2024-04-10 DIAGNOSIS — M54.50 CHRONIC RIGHT-SIDED LOW BACK PAIN WITHOUT SCIATICA: ICD-10-CM

## 2024-04-10 DIAGNOSIS — G89.29 CHRONIC RIGHT-SIDED LOW BACK PAIN WITHOUT SCIATICA: ICD-10-CM

## 2024-04-10 DIAGNOSIS — M47.816 LUMBAR SPONDYLOSIS: ICD-10-CM

## 2024-04-10 PROCEDURE — 64635 DESTROY LUMB/SAC FACET JNT: CPT | Performed by: PAIN MEDICINE

## 2024-04-10 PROCEDURE — 64636 DESTROY L/S FACET JNT ADDL: CPT | Performed by: PAIN MEDICINE

## 2024-04-10 RX ORDER — LIDOCAINE HYDROCHLORIDE 10 MG/ML
1.5 INJECTION, SOLUTION EPIDURAL; INFILTRATION; INTRACAUDAL; PERINEURAL ONCE
Status: COMPLETED | OUTPATIENT
Start: 2024-04-10 | End: 2024-04-10

## 2024-04-10 RX ADMIN — LIDOCAINE HYDROCHLORIDE 1.5 ML: 10 INJECTION, SOLUTION EPIDURAL; INFILTRATION; INTRACAUDAL; PERINEURAL at 13:09

## 2024-04-10 NOTE — PROGRESS NOTES
[facility]  [facility]  [address], [address]  [place], [place]  P: [phone]  F: [phone]             Provider: DYAN ONTIVEROS MD          Patient Name: Aimee Villanueva : 1945        Date: 4/10/2024         PROCEDURE:  Right L3, L4, L5 medial branch ablation by radiofrequency.    Discussed the risks including but not limited to bleeding, infection, worsened pain, damage to surrounding structures, side effects, toxicity, allergic reactions to medications used, need for surgery, pneumothorax, abdominal and visceral injury, as well as catastrophic injury such as vision loss, paralysis, spinal cord or plexus injury, stroke, bowel or bladder incontinence, chest tube insertion, ventilator dependence, and death. Discussed the risks, benefits, and alternatives to the procedure including no procedure at all. Discussed that we cannot undo any permanent neurologic or orthopaedic damage or change the course of any underlying disease. After thorough discussion, patient expressed understanding and willingness to proceed. Written consent was obtained and is in the chart. Verbal consent to proceed was obtained.    Description of Procedure:  The procedure and potential complications were explained to the patient and a voluntary informed, signed consent was obtained.  The patient was placed prone on the x-ray table and the mid back was prepped with Betadine solution.  Under fluoroscopic guidance the skin was infiltrated with 1% preservative free lidocaine. A 20-gauge, 3.5 inch long curved cannula with a 10 mm active curved tip was inserted to place the cannula tip on the junction of the superior articular process and transverse process where the medial branches are located.  After motor tests were done 0.5 mL of 1% preservative free lidocaine was injected.  Ablation was carried out at 80 degrees Celsius for 95 seconds and it was repeated one

## 2024-04-17 DIAGNOSIS — M47.26 OTHER SPONDYLOSIS WITH RADICULOPATHY, LUMBAR REGION: ICD-10-CM

## 2024-04-17 RX ORDER — PREGABALIN 25 MG/1
25 CAPSULE ORAL NIGHTLY
Qty: 30 CAPSULE | Refills: 0 | Status: SHIPPED | OUTPATIENT
Start: 2024-04-17 | End: 2024-05-17

## 2024-04-17 NOTE — TELEPHONE ENCOUNTER
called in for refill and said the shot she received in her hand was not effective.    Rx requested:  Requested Prescriptions     Pending Prescriptions Disp Refills    pregabalin (LYRICA) 25 MG capsule 30 capsule 0     Sig: Take 1 capsule by mouth at bedtime for 30 days. Max Daily Amount: 25 mg       Last Office Visit:   Visit date not found      Last filled:  3/20/24    Next Visit Date:  Future Appointments   Date Time Provider Department Center   4/19/2024 12:30 PM Ryan Guerrero MD [facility]   4/23/2024 10:00 AM Melanie Dumont APRN - CNP [facility]   5/2/2024  1:00 PM Valentin Rojo, DIANE Monterro[place]

## 2024-04-23 ENCOUNTER — OFFICE VISIT (OUTPATIENT)
Dept: PALLATIVE CARE | Age: 79
End: 2024-04-23
Payer: MEDICARE

## 2024-04-23 VITALS — SYSTOLIC BLOOD PRESSURE: 92 MMHG | DIASTOLIC BLOOD PRESSURE: 59 MMHG | OXYGEN SATURATION: 99 % | HEART RATE: 72 BPM

## 2024-04-23 DIAGNOSIS — J44.9 CHRONIC OBSTRUCTIVE PULMONARY DISEASE, UNSPECIFIED COPD TYPE (HCC): ICD-10-CM

## 2024-04-23 DIAGNOSIS — F33.2 MODERATELY SEVERE RECURRENT MAJOR DEPRESSION (HCC): Primary | ICD-10-CM

## 2024-04-23 DIAGNOSIS — Q27.30 AVM (ARTERIOVENOUS MALFORMATION): ICD-10-CM

## 2024-04-23 DIAGNOSIS — F48.2 PSEUDOBULBAR AFFECT: ICD-10-CM

## 2024-04-23 DIAGNOSIS — Z87.19 HISTORY OF GI BLEED: ICD-10-CM

## 2024-04-23 DIAGNOSIS — I50.42 CHRONIC COMBINED SYSTOLIC AND DIASTOLIC CHF (CONGESTIVE HEART FAILURE) (HCC): ICD-10-CM

## 2024-04-23 DIAGNOSIS — Z51.5 PALLIATIVE CARE ENCOUNTER: ICD-10-CM

## 2024-04-23 DIAGNOSIS — K59.00 CONSTIPATION, UNSPECIFIED CONSTIPATION TYPE: ICD-10-CM

## 2024-04-23 DIAGNOSIS — R45.86 EMOTIONAL LABILITY: ICD-10-CM

## 2024-04-23 DIAGNOSIS — M47.26 OTHER SPONDYLOSIS WITH RADICULOPATHY, LUMBAR REGION: ICD-10-CM

## 2024-04-23 PROBLEM — L30.9 ECZEMA: Status: RESOLVED | Noted: 2023-07-17 | Resolved: 2024-04-23

## 2024-04-23 PROBLEM — L21.9 SEBORRHEIC DERMATITIS: Status: RESOLVED | Noted: 2023-07-17 | Resolved: 2024-04-23

## 2024-04-23 PROBLEM — N30.01 ACUTE CYSTITIS WITH HEMATURIA: Status: RESOLVED | Noted: 2020-10-06 | Resolved: 2024-04-23

## 2024-04-23 PROBLEM — N18.9 CHRONIC KIDNEY DISEASE: Status: RESOLVED | Noted: 2021-08-19 | Resolved: 2024-04-23

## 2024-04-23 PROBLEM — E87.70 HYPERVOLEMIA: Status: RESOLVED | Noted: 2022-01-14 | Resolved: 2024-04-23

## 2024-04-23 PROBLEM — I63.9 BRAINSTEM STROKE (HCC): Status: RESOLVED | Noted: 2023-04-07 | Resolved: 2024-04-23

## 2024-04-23 PROBLEM — B37.9 YEAST INFECTION: Status: RESOLVED | Noted: 2021-12-08 | Resolved: 2024-04-23

## 2024-04-23 PROBLEM — I50.41 ACUTE COMBINED SYSTOLIC AND DIASTOLIC CHF, NYHA CLASS 1 (HCC): Status: RESOLVED | Noted: 2019-03-24 | Resolved: 2024-04-23

## 2024-04-23 PROCEDURE — 1123F ACP DISCUSS/DSCN MKR DOCD: CPT | Performed by: NURSE PRACTITIONER

## 2024-04-23 PROCEDURE — 3078F DIAST BP <80 MM HG: CPT | Performed by: NURSE PRACTITIONER

## 2024-04-23 PROCEDURE — 99349 HOME/RES VST EST MOD MDM 40: CPT | Performed by: NURSE PRACTITIONER

## 2024-04-23 PROCEDURE — 3074F SYST BP LT 130 MM HG: CPT | Performed by: NURSE PRACTITIONER

## 2024-04-23 RX ORDER — ESCITALOPRAM OXALATE 5 MG/1
5 TABLET ORAL DAILY
Qty: 30 TABLET | Refills: 0 | Status: SHIPPED | OUTPATIENT
Start: 2024-04-23

## 2024-04-23 ASSESSMENT — PATIENT HEALTH QUESTIONNAIRE - PHQ9
SUM OF ALL RESPONSES TO PHQ9 QUESTIONS 1 & 2: 6
6. FEELING BAD ABOUT YOURSELF - OR THAT YOU ARE A FAILURE OR HAVE LET YOURSELF OR YOUR FAMILY DOWN: SEVERAL DAYS
8. MOVING OR SPEAKING SO SLOWLY THAT OTHER PEOPLE COULD HAVE NOTICED. OR THE OPPOSITE, BEING SO FIGETY OR RESTLESS THAT YOU HAVE BEEN MOVING AROUND A LOT MORE THAN USUAL: NOT AT ALL
SUM OF ALL RESPONSES TO PHQ QUESTIONS 1-9: 15
9. THOUGHTS THAT YOU WOULD BE BETTER OFF DEAD, OR OF HURTING YOURSELF: NOT AT ALL
2. FEELING DOWN, DEPRESSED OR HOPELESS: NEARLY EVERY DAY
10. IF YOU CHECKED OFF ANY PROBLEMS, HOW DIFFICULT HAVE THESE PROBLEMS MADE IT FOR YOU TO DO YOUR WORK, TAKE CARE OF THINGS AT HOME, OR GET ALONG WITH OTHER PEOPLE: SOMEWHAT DIFFICULT
3. TROUBLE FALLING OR STAYING ASLEEP: SEVERAL DAYS
SUM OF ALL RESPONSES TO PHQ QUESTIONS 1-9: 15
SUM OF ALL RESPONSES TO PHQ QUESTIONS 1-9: 15
7. TROUBLE CONCENTRATING ON THINGS, SUCH AS READING THE NEWSPAPER OR WATCHING TELEVISION: SEVERAL DAYS
SUM OF ALL RESPONSES TO PHQ QUESTIONS 1-9: 15
5. POOR APPETITE OR OVEREATING: NEARLY EVERY DAY
1. LITTLE INTEREST OR PLEASURE IN DOING THINGS: NEARLY EVERY DAY
4. FEELING TIRED OR HAVING LITTLE ENERGY: NEARLY EVERY DAY

## 2024-04-23 ASSESSMENT — ENCOUNTER SYMPTOMS
RESPIRATORY NEGATIVE: 1
BACK PAIN: 1
BLOOD IN STOOL: 0
ABDOMINAL PAIN: 0
CONSTIPATION: 1
TROUBLE SWALLOWING: 0

## 2024-04-23 NOTE — PROGRESS NOTES
and THC. S/P injection and ablation. D/C norco. Patient not using. Continue lyrica 25 mg po nightly. Follow-up with pain management.    7. Constipation  Decrease senokot-s to 1 tablet nightly. Patient not taking consistently.    8. Hx of GI bleed  9. AVM malformation  No s/s of bleeding. No GERD. Continue protonix 40 mg po daily.     10. Palliative care encounter  Discussed symptom management related to chronic disease/condition. Provided emotional support and active listening. Patient understands and is agreeable to current plan.         Due to acuity, symptomatology and high-risk medication management, I advised patient to Return in about 4 weeks (around 5/21/2024).     MDM: Alfred Dumont, JOHNATHON - CNP    Collaborating physician: Dr. Balbuena

## 2024-04-26 ENCOUNTER — TELEPHONE (OUTPATIENT)
Dept: INTERVENTIONAL RADIOLOGY/VASCULAR | Age: 79
End: 2024-04-26

## 2024-04-26 NOTE — TELEPHONE ENCOUNTER
Patient's , Delano, was given this information via phone conversation - voiced understanding  2.  Spoke to Joie in Specials to schedule procedure    >  YOUR PROCEDURE IS SCHEDULED ON: 5/1/2024 @ 1:00 pm   >  You will need to arrive at 12:30 pm from home and check in at the Diagnostic Imaging Check In desk.

## 2024-04-30 ENCOUNTER — PRE-PROCEDURE TELEPHONE (OUTPATIENT)
Dept: INTERVENTIONAL RADIOLOGY/VASCULAR | Age: 79
End: 2024-04-30

## 2024-04-30 NOTE — FLOWSHEET NOTE
Call to patient, spoke to pts , the following info was confirmed:     >  YOUR PROCEDURE IS SCHEDULED ON: 5/1/2024 @ 1:00 pm   >  You will need to arrive at 12:30 pm from home and check in at the Diagnostic Imaging Check In desk.

## 2024-05-13 NOTE — TELEPHONE ENCOUNTER
Insurance company is calling because patient is reporting high blood sugar readings. They are running in the three hundreds. Patient states that she is not taking her Humlin three times daily as prescribed because she is only eating twice a day and not three times and it says take with meals. She is also reporting being tired and thirsty. Not sure if the reduction of Humlin is the cause or something else. Please advise. Yes

## 2024-05-16 DIAGNOSIS — Z87.19 HISTORY OF GI BLEED: ICD-10-CM

## 2024-05-16 DIAGNOSIS — E78.00 PURE HYPERCHOLESTEROLEMIA: ICD-10-CM

## 2024-05-16 RX ORDER — PANTOPRAZOLE SODIUM 40 MG/1
TABLET, DELAYED RELEASE ORAL
Qty: 90 TABLET | Refills: 0 | Status: SHIPPED | OUTPATIENT
Start: 2024-05-16

## 2024-05-17 NOTE — TELEPHONE ENCOUNTER
PT spouse returned call- read notes from provider and they understand.     Please go ahead and send the new script for the 10 mg

## 2024-05-17 NOTE — TELEPHONE ENCOUNTER
Pharmacy requesting medication refill. Please approve or deny this request.    Rx requested:  Requested Prescriptions     Pending Prescriptions Disp Refills    rosuvastatin (CRESTOR) 40 MG tablet [Pharmacy Med Name: Rosuvastatin Calcium 40 MG Oral Tablet] 90 tablet 0     Sig: Take 1 tablet by mouth nightly         Last Office Visit:   1/20/2024      Next Visit Date:  Future Appointments   Date Time Provider Department Center   5/21/2024  3:00 PM Melanie Dumont APRN - CNP [facility]

## 2024-05-17 NOTE — TELEPHONE ENCOUNTER
Please let patient know that I would like to reduce her cholesterol medicine dose to 10 mg nightly.  The reason is since her kidney function worsened since the last prescription, I want to make sure the dose she is getting is appropriate for her current kidney function.  If patient agrees we will send it to her pharmacy.  Please let me know thank you

## 2024-05-20 RX ORDER — ROSUVASTATIN CALCIUM 10 MG/1
10 TABLET, COATED ORAL DAILY
Qty: 90 TABLET | Refills: 1 | Status: SHIPPED | OUTPATIENT
Start: 2024-05-20

## 2024-05-21 ENCOUNTER — OFFICE VISIT (OUTPATIENT)
Dept: PALLATIVE CARE | Age: 79
End: 2024-05-21
Payer: MEDICARE

## 2024-05-21 VITALS — SYSTOLIC BLOOD PRESSURE: 139 MMHG | HEART RATE: 86 BPM | OXYGEN SATURATION: 98 % | DIASTOLIC BLOOD PRESSURE: 59 MMHG

## 2024-05-21 DIAGNOSIS — Z51.5 PALLIATIVE CARE ENCOUNTER: ICD-10-CM

## 2024-05-21 DIAGNOSIS — Z87.19 HISTORY OF GI BLEED: ICD-10-CM

## 2024-05-21 DIAGNOSIS — M47.26 OTHER SPONDYLOSIS WITH RADICULOPATHY, LUMBAR REGION: Primary | ICD-10-CM

## 2024-05-21 DIAGNOSIS — F33.2 MODERATELY SEVERE RECURRENT MAJOR DEPRESSION (HCC): ICD-10-CM

## 2024-05-21 DIAGNOSIS — R45.86 EMOTIONAL LABILITY: ICD-10-CM

## 2024-05-21 DIAGNOSIS — J44.9 CHRONIC OBSTRUCTIVE PULMONARY DISEASE, UNSPECIFIED COPD TYPE (HCC): ICD-10-CM

## 2024-05-21 DIAGNOSIS — K59.00 CONSTIPATION, UNSPECIFIED CONSTIPATION TYPE: ICD-10-CM

## 2024-05-21 DIAGNOSIS — I50.42 CHRONIC COMBINED SYSTOLIC AND DIASTOLIC CHF (CONGESTIVE HEART FAILURE) (HCC): ICD-10-CM

## 2024-05-21 DIAGNOSIS — F48.2 PSEUDOBULBAR AFFECT: ICD-10-CM

## 2024-05-21 DIAGNOSIS — Q27.30 AVM (ARTERIOVENOUS MALFORMATION): ICD-10-CM

## 2024-05-21 PROCEDURE — 99349 HOME/RES VST EST MOD MDM 40: CPT | Performed by: NURSE PRACTITIONER

## 2024-05-21 PROCEDURE — 3078F DIAST BP <80 MM HG: CPT | Performed by: NURSE PRACTITIONER

## 2024-05-21 PROCEDURE — 1123F ACP DISCUSS/DSCN MKR DOCD: CPT | Performed by: NURSE PRACTITIONER

## 2024-05-21 PROCEDURE — 3075F SYST BP GE 130 - 139MM HG: CPT | Performed by: NURSE PRACTITIONER

## 2024-05-21 ASSESSMENT — ENCOUNTER SYMPTOMS
TROUBLE SWALLOWING: 0
RESPIRATORY NEGATIVE: 1
BACK PAIN: 1
ABDOMINAL PAIN: 0
BLOOD IN STOOL: 0

## 2024-05-21 NOTE — PROGRESS NOTES
Overall Financial Resource Strain (CARDIA)     Difficulty of Paying Living Expenses: Not hard at all   Food Insecurity: Not on file (2023)   Transportation Needs: Unknown (2023)    PRAPARE - Transportation     Lack of Transportation (Medical): Not on file     Lack of Transportation (Non-Medical): No   Physical Activity: Insufficiently Active (2023)    Exercise Vital Sign     Days of Exercise per Week: 7 days     Minutes of Exercise per Session: 10 min   Stress: No Stress Concern Present (10/14/2020)    [employer] [place] [employer] - Occupational Stress Questionnaire     Feeling of Stress : Only a little   Social Connections: Moderately Integrated (10/14/2020)    Social Connection and Isolation Panel [NHANES]     Frequency of Communication with Friends and Family: More than three times a week     Frequency of Social Gatherings with Friends and Family: More than three times a week     Attends [employer] Services: 1 to 4 times per year     Active Member of Clubs or Organizations: No     Attends Club or Organization Meetings: Never     Marital Status: Living with partner   Intimate Partner Violence: Not At Risk (10/14/2020)    Humiliation, Afraid, Rape, and Kick questionnaire     Fear of Current or Ex-Partner: No     Emotionally Abused: No     Physically Abused: No     Sexually Abused: No   Housing Stability: Unknown (2023)    Housing Stability Vital Sign     Unable to Pay for Housing in the Last Year: Not on file     Number of Places Lived in the Last Year: Not on file     Unstable Housing in the Last Year: No     Family History   Problem Relation Age of Onset    Heart Disease Father         cardiac bypass    Arthritis Father     Arthritis Mother     Other Mother          at age 91    Other Sister         [facility]    No Known Problems Daughter     Stroke Son      Allergies   Allergen Reactions    Ibuprofen Nausea Only    Metformin And Related      Diarrhea      Darvon

## 2024-05-24 RX ORDER — ESCITALOPRAM OXALATE 5 MG/1
5 TABLET ORAL DAILY
Qty: 90 TABLET | Refills: 1 | Status: SHIPPED | OUTPATIENT
Start: 2024-05-24

## 2024-05-24 ASSESSMENT — ENCOUNTER SYMPTOMS: CONSTIPATION: 1

## 2024-05-31 DIAGNOSIS — M47.26 OTHER SPONDYLOSIS WITH RADICULOPATHY, LUMBAR REGION: ICD-10-CM

## 2024-05-31 RX ORDER — PREGABALIN 25 MG/1
25 CAPSULE ORAL NIGHTLY
Qty: 30 CAPSULE | Refills: 0 | Status: SHIPPED | OUTPATIENT
Start: 2024-05-31 | End: 2024-05-31

## 2024-05-31 RX ORDER — PREGABALIN 25 MG/1
25 CAPSULE ORAL 2 TIMES DAILY
Qty: 60 CAPSULE | Refills: 0 | Status: SHIPPED | OUTPATIENT
Start: 2024-05-31 | End: 2024-06-30

## 2024-05-31 NOTE — TELEPHONE ENCOUNTER
Rx request   Requested Prescriptions     Pending Prescriptions Disp Refills    pregabalin (LYRICA) 25 MG capsule 30 capsule 0     Sig: Take 1 capsule by mouth at bedtime for 30 days. Max Daily Amount: 25 mg     LOV 5/21/2024  Next Visit Date:  Future Appointments   Date Time Provider Department Center   6/18/2024  9:45 AM Holko, MD SEAN [facility]   6/18/2024 11:30 AM Melanie Dumont APRN - CNP [facility]

## 2024-06-03 DIAGNOSIS — I50.41 ACUTE COMBINED SYSTOLIC AND DIASTOLIC CHF, NYHA CLASS 1 (HCC): ICD-10-CM

## 2024-06-03 RX ORDER — CARVEDILOL 25 MG/1
TABLET ORAL
Qty: 270 TABLET | Refills: 1 | Status: SHIPPED | OUTPATIENT
Start: 2024-06-03

## 2024-06-03 NOTE — TELEPHONE ENCOUNTER
Requesting medication refill. Please approve or deny this request.    Rx requested:  Requested Prescriptions     Pending Prescriptions Disp Refills    carvedilol (COREG) 25 MG tablet [Pharmacy Med Name: Carvedilol 25 MG Oral Tablet] 270 tablet 0     Sig: TAKE 1 & 1/2 (ONE & ONE-HALF) TABLETS BY MOUTH TWICE DAILY WITH MEALS         Last Office Visit:   11/13/2023      Next Visit Date:  Future Appointments   Date Time Provider Department Center   6/18/2024  9:45 AM Bianca Norman MD [place] [facility]   6/18/2024 11:30 AM Melanie Dumont APRN - CNP [facility]               Last refill 06/19/2023. Please approve or deny.

## 2024-06-11 ENCOUNTER — TELEPHONE (OUTPATIENT)
Dept: INTERVENTIONAL RADIOLOGY/VASCULAR | Age: 79
End: 2024-06-11

## 2024-06-11 ENCOUNTER — OFFICE VISIT (OUTPATIENT)
Dept: PAIN MANAGEMENT | Age: 79
End: 2024-06-11
Payer: MEDICARE

## 2024-06-11 VITALS
HEIGHT: 59 IN | DIASTOLIC BLOOD PRESSURE: 64 MMHG | SYSTOLIC BLOOD PRESSURE: 120 MMHG | BODY MASS INDEX: 29.43 KG/M2 | WEIGHT: 146 LBS

## 2024-06-11 DIAGNOSIS — M54.16 LUMBAR RADICULOPATHY: Primary | ICD-10-CM

## 2024-06-11 DIAGNOSIS — M48.061 LUMBAR FORAMINAL STENOSIS: ICD-10-CM

## 2024-06-11 DIAGNOSIS — M51.36 DDD (DEGENERATIVE DISC DISEASE), LUMBAR: ICD-10-CM

## 2024-06-11 PROCEDURE — 3078F DIAST BP <80 MM HG: CPT | Performed by: NURSE PRACTITIONER

## 2024-06-11 PROCEDURE — 99214 OFFICE O/P EST MOD 30 MIN: CPT | Performed by: NURSE PRACTITIONER

## 2024-06-11 PROCEDURE — 3074F SYST BP LT 130 MM HG: CPT | Performed by: NURSE PRACTITIONER

## 2024-06-11 PROCEDURE — 1123F ACP DISCUSS/DSCN MKR DOCD: CPT | Performed by: NURSE PRACTITIONER

## 2024-06-11 ASSESSMENT — ENCOUNTER SYMPTOMS
DIARRHEA: 0
GASTROINTESTINAL NEGATIVE: 1
TROUBLE SWALLOWING: 0
COUGH: 0
CONSTIPATION: 0
BACK PAIN: 1
EYES NEGATIVE: 1
SHORTNESS OF BREATH: 0

## 2024-06-11 NOTE — PROGRESS NOTES
umbilical hernia repair    IR PORT PLACEMENT EQUAL OR GREATER THAN 5 YEARS  2021    IR PORT PLACEMENT EQUAL OR GREATER THAN 5 YEARS 2021 [facility]    ND ESOPHAGOGASTRODUODENOSCOPY TRANSORAL DIAGNOSTIC N/A 2017    EGD ESOPHAGOGASTRODUODENOSCOPY performed by Joselito Senior MD at [facility]    ND LAPS ABD PRTM&OMENTUM DX W/WO SPEC BR/WA SPX N/A 2018    negative findings    ND NEUROPLASTY &/TRANSPOS MEDIAN NRV CARPAL TUNNE Left 2017    LEFT  CARPAL TUNNEL RELEASE performed by Tony Childress MD at [facility] [place]    ND RPR UMBILICAL HRNA 5 YRS/> REDUCIBLE N/A 2018    TONSILLECTOMY      as child    TUNNELED VENOUS PORT PLACEMENT Right 2021    8 Fr Bard Power Port ClearVUE by Dr. Ford    UPPER GASTROINTESTINAL ENDOSCOPY  2016    w/bx     UPPER GASTROINTESTINAL ENDOSCOPY N/A 2020    EGD possible biopsy performed by Sarah Regalado MD at [facility]    UPPER GASTROINTESTINAL ENDOSCOPY N/A 2020    EGD PUSH ENTEROSCOPY performed by Rm Adams MD at [facility]     Family History   Problem Relation Age of Onset    Heart Disease Father         cardiac bypass    Arthritis Father     Arthritis Mother     Other Mother          at age 91    Other Sister         [facility] health     No Known Problems Daughter     Stroke Son      Social History     Socioeconomic History    Marital status:      Spouse name: Librado    Number of children: 2    Years of education: 12    Highest education level: High school graduate   Occupational History    Occupation: Retired-   Tobacco Use    Smoking status: Former     Current packs/day: 0.00     Average packs/day: 1 pack/day for 59.0 years (59.0 ttl pk-yrs)     Types: Cigarettes     Start date: 2017     Quit date: 10/1/2020     Years since quitting: 3.6    Smokeless tobacco: Never   Vaping Use    Vaping Use: Never used   Substance and Sexual Activity    Alcohol use: Not

## 2024-06-11 NOTE — TELEPHONE ENCOUNTER
Patient's , Delano, was given this information via phone conversation - voiced understanding  2.  Spoke to Joie in Specials to schedule procedure    >  YOUR PROCEDURE IS SCHEDULED ON: 6/19/2024 @ 1:30 pm   >  You will need to arrive at 1:00 pm from home and check in at the Diagnostic Imaging Check In desk.

## 2024-06-13 ENCOUNTER — TELEPHONE (OUTPATIENT)
Dept: PAIN MANAGEMENT | Age: 79
End: 2024-06-13

## 2024-06-13 NOTE — TELEPHONE ENCOUNTER
Left patient message to call office back to be scheduled. Please confirm no blood thinners and antibiotics.      RIGHT L4-5 TFESI     NO AUTH REQUIRED

## 2024-06-13 NOTE — TELEPHONE ENCOUNTER
RIGHT L4-5 TFESI    NO AUTH REQUIRED    OK to schedule procedure approved as above.   Please note sides/levels approved and date range.   (If applicable, sides/levels approved may differ from those ordered)    TO BE SCHEDULED WITH

## 2024-06-18 ENCOUNTER — OFFICE VISIT (OUTPATIENT)
Dept: PALLATIVE CARE | Age: 79
End: 2024-06-18
Payer: MEDICARE

## 2024-06-18 VITALS — OXYGEN SATURATION: 95 % | SYSTOLIC BLOOD PRESSURE: 148 MMHG | DIASTOLIC BLOOD PRESSURE: 61 MMHG | HEART RATE: 79 BPM

## 2024-06-18 DIAGNOSIS — Z51.5 PALLIATIVE CARE ENCOUNTER: ICD-10-CM

## 2024-06-18 DIAGNOSIS — M47.26 OTHER SPONDYLOSIS WITH RADICULOPATHY, LUMBAR REGION: Primary | ICD-10-CM

## 2024-06-18 DIAGNOSIS — R45.86 EMOTIONAL LABILITY: ICD-10-CM

## 2024-06-18 DIAGNOSIS — F48.2 PSEUDOBULBAR AFFECT: ICD-10-CM

## 2024-06-18 DIAGNOSIS — K59.00 CONSTIPATION, UNSPECIFIED CONSTIPATION TYPE: ICD-10-CM

## 2024-06-18 DIAGNOSIS — J44.9 CHRONIC OBSTRUCTIVE PULMONARY DISEASE, UNSPECIFIED COPD TYPE (HCC): ICD-10-CM

## 2024-06-18 DIAGNOSIS — Z87.19 HISTORY OF GI BLEED: ICD-10-CM

## 2024-06-18 DIAGNOSIS — I50.42 CHRONIC COMBINED SYSTOLIC AND DIASTOLIC CHF (CONGESTIVE HEART FAILURE) (HCC): ICD-10-CM

## 2024-06-18 DIAGNOSIS — Q27.30 AVM (ARTERIOVENOUS MALFORMATION): ICD-10-CM

## 2024-06-18 DIAGNOSIS — F33.2 MODERATELY SEVERE RECURRENT MAJOR DEPRESSION (HCC): ICD-10-CM

## 2024-06-18 PROCEDURE — 3077F SYST BP >= 140 MM HG: CPT | Performed by: NURSE PRACTITIONER

## 2024-06-18 PROCEDURE — 3078F DIAST BP <80 MM HG: CPT | Performed by: NURSE PRACTITIONER

## 2024-06-18 PROCEDURE — 99349 HOME/RES VST EST MOD MDM 40: CPT | Performed by: NURSE PRACTITIONER

## 2024-06-18 PROCEDURE — 1123F ACP DISCUSS/DSCN MKR DOCD: CPT | Performed by: NURSE PRACTITIONER

## 2024-06-18 RX ORDER — PREGABALIN 50 MG/1
50 CAPSULE ORAL 2 TIMES DAILY
Qty: 60 CAPSULE | Refills: 0 | Status: SHIPPED | OUTPATIENT
Start: 2024-06-18 | End: 2024-07-18

## 2024-06-18 ASSESSMENT — ENCOUNTER SYMPTOMS
CONSTIPATION: 1
BACK PAIN: 1
TROUBLE SWALLOWING: 0
ABDOMINAL PAIN: 0
BLOOD IN STOOL: 0
RESPIRATORY NEGATIVE: 1

## 2024-06-18 NOTE — PROGRESS NOTES
Daughter     Stroke Son      Allergies   Allergen Reactions    Ibuprofen Nausea Only    Metformin And Related      Diarrhea      Darvon [Propoxyphene Hcl] Nausea And Vomiting     Current Outpatient Medications on File Prior to Visit   Medication Sig Dispense Refill    carvedilol (COREG) 25 MG tablet TAKE 1 & 1/2 (ONE & ONE-HALF) TABLETS BY MOUTH TWICE DAILY WITH MEALS 270 tablet 1    escitalopram (LEXAPRO) 5 MG tablet Take 1 tablet by mouth daily 90 tablet 1    rosuvastatin (CRESTOR) 10 MG tablet Take 1 tablet by mouth daily 90 tablet 1    pantoprazole (PROTONIX) 40 MG tablet TAKE 1 TABLET BY MOUTH ONCE DAILY IN THE MORNING BEFORE BREAKFAST 90 tablet 0    sennosides-docusate sodium (SENOKOT-S) 8.6-50 MG tablet Take 2 tablets by mouth daily For constipation. (Patient taking differently: Take 1 tablet by mouth nightly For constipation.) 120 tablet 0    Accu-Chek Softclix Lancets MISC bid 100 each 3    ACCU-CHEK PHYLLIS PLUS strip Test 3x daily Dx E11.65 100 each 3    insulin glargine (LANTUS SOLOSTAR) 100 UNIT/ML injection pen Inject 30 units mornings, 80 units at night 10 Adjustable Dose Pre-filled Pen Syringe 3    insulin lispro (HUMALOG) 100 UNIT/ML SOLN injection vial INJECT SUBCUTANEOUSLY THREE TIMES DAILY - 24 UNITS BEFORE LUNCH, 24 UNITS WITH A MIDDAY SNACK, AND INCREASE TO 36 UNITS BEFORE DINNER 30 mL 3    Insulin Pen Needle (PEN NEEDLES) 32G X 4 MM MISC 1 Device by Does not apply route 2 times daily 100 each 3    digoxin (LANOXIN) 125 MCG tablet TAKE 1 TABLET BY MOUTH EVERY OTHER DAY 45 tablet 3    Lancets MISC Pt test 2x daily 50 each 3    Continuous Blood Gluc Sensor (DEXCOM G7 SENSOR) MISC 1 Device by Does not apply route every 10 days 12 each 10    Continuous Blood Gluc  (DEXCOM G7 ) [place] 1 Device by Does not apply route daily 1 each 0    amLODIPine (NORVASC) 5 MG tablet Take 1 tablet by mouth daily 90 tablet 3    nitrofurantoin (MACRODANTIN) 50 MG capsule Take 1 capsule by mouth daily

## 2024-06-19 ENCOUNTER — HOSPITAL ENCOUNTER (OUTPATIENT)
Dept: INTERVENTIONAL RADIOLOGY/VASCULAR | Age: 79
Discharge: HOME | End: 2024-06-21
Payer: MEDICARE

## 2024-06-19 VITALS — DIASTOLIC BLOOD PRESSURE: 72 MMHG | OXYGEN SATURATION: 99 % | SYSTOLIC BLOOD PRESSURE: 135 MMHG | HEART RATE: 77 BPM

## 2024-06-19 DIAGNOSIS — Z95.828 PORT-A-CATH IN PLACE: ICD-10-CM

## 2024-06-19 PROCEDURE — 2580000003 HC RX 258: Performed by: RADIOLOGY

## 2024-06-19 PROCEDURE — 36598 INJ W/FLUOR EVAL CV DEVICE: CPT

## 2024-06-19 PROCEDURE — 6360000004 HC RX CONTRAST MEDICATION: Performed by: RADIOLOGY

## 2024-06-19 PROCEDURE — 6360000002 HC RX W HCPCS: Performed by: RADIOLOGY

## 2024-06-19 PROCEDURE — 36598 INJ W/FLUOR EVAL CV DEVICE: CPT | Performed by: RADIOLOGY

## 2024-06-19 RX ORDER — HEPARIN 100 UNIT/ML
SYRINGE INTRAVENOUS PRN
Status: COMPLETED | OUTPATIENT
Start: 2024-06-19 | End: 2024-06-19

## 2024-06-19 RX ORDER — SODIUM CHLORIDE 0.9 % (FLUSH) 0.9 %
SYRINGE (ML) INJECTION PRN
Status: COMPLETED | OUTPATIENT
Start: 2024-06-19 | End: 2024-06-19

## 2024-06-19 RX ADMIN — HEPARIN 500 UNITS: 100 SYRINGE at 14:20

## 2024-06-19 RX ADMIN — Medication 400 ML: at 14:28

## 2024-06-19 RX ADMIN — IOPAMIDOL 8 ML: 755 INJECTION, SOLUTION INTRAVENOUS at 14:20

## 2024-06-19 NOTE — DISCHARGE INSTRUCTIONS
CONTRAST INJECTION    You received an injection of a contrast material containing iodine (X-ray dye). This material  rarely causes allergic reactions, however, in the event you experience any tightness in your throat, chest  pain or tightness, shortness of breath, extreme dizziness, itchiness, hives or other unexplainable  symptoms, please call your physician, call 911, or go to the nearest Emergency Room. If you experience a  problem in the area of the injection after 24 hours, such as redness, swelling or soreness, please contact  your physician.     Drink at least thirty-two (32) ounces of water over the next 24 hours. If you are on fluid  restrictions, contact your doctor for instructions to help clear any contrast materials from your body.  You may resume normal activities or activity limitation as prescribed by your physician. This includes  the taking of prescribed medication, eating and drinking. However, if you are taking a Metformin based  medication (Glucophage, Glucovance, etc.) and you received an IV injection, you must check with your  physician prior to restarting your Metformin medication.    Your exam will be interpreted by a Radiologist and in most cases, the results will be sent to the ordering  physician within 24-72 hours. The results of the exam will be provided to you by the physician who  ordered the test. We encourage you to contact the ordering physician regarding the availability of the  report if you have not received the results after 72 hours.

## 2024-06-19 NOTE — OR NURSING
1310 Patient taken via WC to specials room.  Procedure explained while pt in WC. Consent signed.  No allergy to IV contrast dye. Pt having significant right hip pain and is not tolerating laying on table well. Sponge placed under knees for comfort. Pt states she does not know why she is here for a port check. She states that her port has been being used without complication, she states that she is unaware of any medication being infused during port. Pt reporting that the last time she remembers her port being accessed and flushed was prior to christmas of 2023.     1326 VSS. Port area cleansed with chloraprep. After 3 minute dry time Port accessed with Bard Power Port Needle 20 g x 1 inch by FELICIA RN. Difficulty with needle advancing.     1333 Attempted to access again by OXANA with same needle, unsuccessful. Pt states that her port is usually accessed during a sitting position.     1336 Pt transferred to cart via slide board and 4 assist. Pt moved to a sitting position, site cleansed with chlorhexidine.     1339 Port accessed with Bard Power Port Needle 20 g x .075 inch by OXANA RN. Per RN, needle was difficult to advance.     1341 Blood return noted and port flushes without complication. Tegaderm dressing applied. Pt moved back over to procedure table from cart via slide board and 4 staff.     1342 Port flushed again to ensure still working properly.     1415 Timeout completed.     1418 Dr. Ford injected 5 ml of  contrast and visualized with fluoro guidance.    Verified proper position and functionality of implanted right internal jugular mediport.     1420 Port flushed with 10cc of normal saline then instilled with 5cc Heparinized saline and Port deaccessed.  Printed communication order provided to patient that states port placement and functionality confirmed.  Pt discharged to home at this time. Pt taken via WC to d/c. Pts family to drive her home.     TOTAL CONTRAST: 8 ml

## 2024-06-26 ENCOUNTER — PROCEDURE VISIT (OUTPATIENT)
Age: 79
End: 2024-06-26
Payer: MEDICARE

## 2024-06-26 VITALS
SYSTOLIC BLOOD PRESSURE: 128 MMHG | WEIGHT: 134 LBS | BODY MASS INDEX: 27.01 KG/M2 | HEART RATE: 85 BPM | OXYGEN SATURATION: 98 % | TEMPERATURE: 98.5 F | DIASTOLIC BLOOD PRESSURE: 66 MMHG | HEIGHT: 59 IN

## 2024-06-26 DIAGNOSIS — M54.16 LUMBAR RADICULOPATHY: ICD-10-CM

## 2024-06-26 DIAGNOSIS — M48.061 LUMBAR FORAMINAL STENOSIS: ICD-10-CM

## 2024-06-26 PROCEDURE — 64483 NJX AA&/STRD TFRM EPI L/S 1: CPT | Performed by: PAIN MEDICINE

## 2024-06-26 RX ORDER — SODIUM CHLORIDE 9 MG/ML
1 INJECTION, SOLUTION INTRAMUSCULAR; INTRAVENOUS; SUBCUTANEOUS ONCE
Status: COMPLETED | OUTPATIENT
Start: 2024-06-26 | End: 2024-06-26

## 2024-06-26 RX ORDER — DEXAMETHASONE SODIUM PHOSPHATE 10 MG/ML
5 INJECTION, SOLUTION INTRAMUSCULAR; INTRAVENOUS ONCE
Status: COMPLETED | OUTPATIENT
Start: 2024-06-26 | End: 2024-06-26

## 2024-06-26 RX ORDER — LIDOCAINE HYDROCHLORIDE 20 MG/ML
1 INJECTION, SOLUTION INFILTRATION; PERINEURAL ONCE
Status: DISCONTINUED | OUTPATIENT
Start: 2024-06-26 | End: 2024-06-26

## 2024-06-26 RX ORDER — LIDOCAINE HYDROCHLORIDE 10 MG/ML
3 INJECTION, SOLUTION EPIDURAL; INFILTRATION; INTRACAUDAL; PERINEURAL ONCE
Status: COMPLETED | OUTPATIENT
Start: 2024-06-26 | End: 2024-06-26

## 2024-06-26 RX ADMIN — LIDOCAINE HYDROCHLORIDE 3 ML: 10 INJECTION, SOLUTION EPIDURAL; INFILTRATION; INTRACAUDAL; PERINEURAL at 14:09

## 2024-06-26 RX ADMIN — SODIUM CHLORIDE 1 ML: 9 INJECTION, SOLUTION INTRAMUSCULAR; INTRAVENOUS; SUBCUTANEOUS at 13:57

## 2024-06-26 RX ADMIN — DEXAMETHASONE SODIUM PHOSPHATE 5 MG: 10 INJECTION, SOLUTION INTRAMUSCULAR; INTRAVENOUS at 13:57

## 2024-07-03 ENCOUNTER — APPOINTMENT (OUTPATIENT)
Dept: CT IMAGING | Age: 79
End: 2024-07-03
Payer: MEDICARE

## 2024-07-03 ENCOUNTER — HOSPITAL ENCOUNTER (EMERGENCY)
Age: 79
Discharge: HOME | End: 2024-07-03
Payer: MEDICARE

## 2024-07-03 ENCOUNTER — OFFICE VISIT (OUTPATIENT)
Dept: FAMILY MEDICINE CLINIC | Age: 79
End: 2024-07-03
Payer: MEDICARE

## 2024-07-03 VITALS
DIASTOLIC BLOOD PRESSURE: 76 MMHG | TEMPERATURE: 98.3 F | WEIGHT: 146 LBS | SYSTOLIC BLOOD PRESSURE: 151 MMHG | HEIGHT: 59 IN | HEART RATE: 85 BPM | BODY MASS INDEX: 29.43 KG/M2 | RESPIRATION RATE: 27 BRPM | OXYGEN SATURATION: 100 %

## 2024-07-03 VITALS
OXYGEN SATURATION: 97 % | RESPIRATION RATE: 13 BRPM | SYSTOLIC BLOOD PRESSURE: 138 MMHG | TEMPERATURE: 98.2 F | DIASTOLIC BLOOD PRESSURE: 60 MMHG | HEART RATE: 89 BPM | HEIGHT: 59 IN | WEIGHT: 134 LBS | BODY MASS INDEX: 27.01 KG/M2

## 2024-07-03 DIAGNOSIS — R19.7 DIARRHEA, UNSPECIFIED TYPE: ICD-10-CM

## 2024-07-03 DIAGNOSIS — R19.5 DARK STOOLS: ICD-10-CM

## 2024-07-03 DIAGNOSIS — R19.7 DIARRHEA, UNSPECIFIED TYPE: Primary | ICD-10-CM

## 2024-07-03 DIAGNOSIS — R42 DIZZINESS: Primary | ICD-10-CM

## 2024-07-03 LAB
ALBUMIN SERPL-MCNC: 4.1 G/DL (ref 3.5–4.6)
ALP SERPL-CCNC: 143 U/L (ref 40–130)
ALT SERPL-CCNC: 9 U/L (ref 0–33)
ANION GAP SERPL CALCULATED.3IONS-SCNC: 11 MEQ/L (ref 9–15)
AST SERPL-CCNC: 15 U/L (ref 0–35)
BASOPHILS # BLD: 0 K/UL (ref 0–0.2)
BASOPHILS NFR BLD: 0.6 %
BILIRUB SERPL-MCNC: 0.3 MG/DL (ref 0.2–0.7)
BUN SERPL-MCNC: 28 MG/DL (ref 8–23)
CALCIUM SERPL-MCNC: 9.8 MG/DL (ref 8.5–9.9)
CHLORIDE SERPL-SCNC: 109 MEQ/L (ref 95–107)
CO2 SERPL-SCNC: 22 MEQ/L (ref 20–31)
CREAT SERPL-MCNC: 1.68 MG/DL (ref 0.5–0.9)
EOSINOPHIL # BLD: 0.4 K/UL (ref 0–0.7)
EOSINOPHIL NFR BLD: 5.1 %
ERYTHROCYTE [DISTWIDTH] IN BLOOD BY AUTOMATED COUNT: 14.2 % (ref 11.5–14.5)
GLOBULIN SER CALC-MCNC: 2.8 G/DL (ref 2.3–3.5)
GLUCOSE SERPL-MCNC: 190 MG/DL (ref 70–99)
HCT VFR BLD AUTO: 37.7 % (ref 37–47)
HGB BLD-MCNC: 12.5 G/DL (ref 12–16)
LYMPHOCYTES # BLD: 1.2 K/UL (ref 1–4.8)
LYMPHOCYTES NFR BLD: 16.6 %
MAGNESIUM SERPL-MCNC: 1.9 MG/DL (ref 1.7–2.4)
MCH RBC QN AUTO: 28.2 PG (ref 27–31.3)
MCHC RBC AUTO-ENTMCNC: 33.2 % (ref 33–37)
MCV RBC AUTO: 84.9 FL (ref 79.4–94.8)
MONOCYTES # BLD: 0.5 K/UL (ref 0.2–0.8)
MONOCYTES NFR BLD: 6.8 %
NEUTROPHILS # BLD: 5 K/UL (ref 1.4–6.5)
NEUTS SEG NFR BLD: 70.5 %
PERFORMED ON: ABNORMAL
PLATELET # BLD AUTO: 327 K/UL (ref 130–400)
POC CREATININE WHOLE BLOOD: 1.8
POC CREATININE: 1.8 MG/DL (ref 0.6–1.2)
POC SAMPLE TYPE: ABNORMAL
POTASSIUM SERPL-SCNC: 3.9 MEQ/L (ref 3.4–4.9)
PROT SERPL-MCNC: 6.9 G/DL (ref 6.3–8)
RBC # BLD AUTO: 4.44 M/UL (ref 4.2–5.4)
SODIUM SERPL-SCNC: 142 MEQ/L (ref 135–144)
WBC # BLD AUTO: 7.1 K/UL (ref 4.8–10.8)

## 2024-07-03 PROCEDURE — 85025 COMPLETE CBC W/AUTO DIFF WBC: CPT

## 2024-07-03 PROCEDURE — 3078F DIAST BP <80 MM HG: CPT | Performed by: NURSE PRACTITIONER

## 2024-07-03 PROCEDURE — 99212 OFFICE O/P EST SF 10 MIN: CPT | Performed by: NURSE PRACTITIONER

## 2024-07-03 PROCEDURE — 6370000000 HC RX 637 (ALT 250 FOR IP): Performed by: PHYSICIAN ASSISTANT

## 2024-07-03 PROCEDURE — 96361 HYDRATE IV INFUSION ADD-ON: CPT

## 2024-07-03 PROCEDURE — 3075F SYST BP GE 130 - 139MM HG: CPT | Performed by: NURSE PRACTITIONER

## 2024-07-03 PROCEDURE — 87324 CLOSTRIDIUM AG IA: CPT

## 2024-07-03 PROCEDURE — 87449 NOS EACH ORGANISM AG IA: CPT

## 2024-07-03 PROCEDURE — 96360 HYDRATION IV INFUSION INIT: CPT

## 2024-07-03 PROCEDURE — 74176 CT ABD & PELVIS W/O CONTRAST: CPT

## 2024-07-03 PROCEDURE — 2580000003 HC RX 258: Performed by: PHYSICIAN ASSISTANT

## 2024-07-03 PROCEDURE — 83735 ASSAY OF MAGNESIUM: CPT

## 2024-07-03 PROCEDURE — 1123F ACP DISCUSS/DSCN MKR DOCD: CPT | Performed by: NURSE PRACTITIONER

## 2024-07-03 PROCEDURE — 80053 COMPREHEN METABOLIC PANEL: CPT

## 2024-07-03 PROCEDURE — 99284 EMERGENCY DEPT VISIT MOD MDM: CPT

## 2024-07-03 PROCEDURE — 87507 IADNA-DNA/RNA PROBE TQ 12-25: CPT

## 2024-07-03 PROCEDURE — 36415 COLL VENOUS BLD VENIPUNCTURE: CPT

## 2024-07-03 RX ORDER — LOPERAMIDE HYDROCHLORIDE 2 MG/1
4 CAPSULE ORAL ONCE
Status: COMPLETED | OUTPATIENT
Start: 2024-07-03 | End: 2024-07-03

## 2024-07-03 RX ORDER — 0.9 % SODIUM CHLORIDE 0.9 %
500 INTRAVENOUS SOLUTION INTRAVENOUS ONCE
Status: COMPLETED | OUTPATIENT
Start: 2024-07-03 | End: 2024-07-03

## 2024-07-03 RX ADMIN — LOPERAMIDE HYDROCHLORIDE 4 MG: 2 CAPSULE ORAL at 13:47

## 2024-07-03 RX ADMIN — SODIUM CHLORIDE 500 ML: 9 INJECTION, SOLUTION INTRAVENOUS at 13:18

## 2024-07-03 SDOH — ECONOMIC STABILITY: FOOD INSECURITY: WITHIN THE PAST 12 MONTHS, THE FOOD YOU BOUGHT JUST DIDN'T LAST AND YOU DIDN'T HAVE MONEY TO GET MORE.: NEVER TRUE

## 2024-07-03 SDOH — ECONOMIC STABILITY: FOOD INSECURITY: WITHIN THE PAST 12 MONTHS, YOU WORRIED THAT YOUR FOOD WOULD RUN OUT BEFORE YOU GOT MONEY TO BUY MORE.: NEVER TRUE

## 2024-07-03 SDOH — ECONOMIC STABILITY: INCOME INSECURITY: HOW HARD IS IT FOR YOU TO PAY FOR THE VERY BASICS LIKE FOOD, HOUSING, MEDICAL CARE, AND HEATING?: NOT HARD AT ALL

## 2024-07-03 SDOH — ECONOMIC STABILITY: HOUSING INSECURITY
IN THE LAST 12 MONTHS, WAS THERE A TIME WHEN YOU DID NOT HAVE A STEADY PLACE TO SLEEP OR SLEPT IN A SHELTER (INCLUDING NOW)?: NO

## 2024-07-03 ASSESSMENT — ENCOUNTER SYMPTOMS
RECTAL PAIN: 1
EYE PAIN: 0
CONSTIPATION: 0
COUGH: 0
ABDOMINAL PAIN: 0
PHOTOPHOBIA: 0
RHINORRHEA: 0
SHORTNESS OF BREATH: 0
COUGH: 0
SORE THROAT: 0
ANAL BLEEDING: 0
DIARRHEA: 1
NAUSEA: 0
VOMITING: 0
SHORTNESS OF BREATH: 0
ABDOMINAL PAIN: 0
ABDOMINAL DISTENTION: 0
NAUSEA: 0
VOMITING: 0
BACK PAIN: 0
DIARRHEA: 1
BLOOD IN STOOL: 0

## 2024-07-03 ASSESSMENT — PAIN - FUNCTIONAL ASSESSMENT
PAIN_FUNCTIONAL_ASSESSMENT: 0-10
PAIN_FUNCTIONAL_ASSESSMENT: NONE - DENIES PAIN

## 2024-07-03 ASSESSMENT — PAIN DESCRIPTION - PAIN TYPE: TYPE: ACUTE PAIN

## 2024-07-03 ASSESSMENT — PAIN DESCRIPTION - LOCATION: LOCATION: RECTUM

## 2024-07-03 ASSESSMENT — PAIN SCALES - GENERAL: PAINLEVEL_OUTOF10: 9

## 2024-07-03 ASSESSMENT — PAIN DESCRIPTION - DESCRIPTORS: DESCRIPTORS: ACHING;BURNING

## 2024-07-03 NOTE — ED TRIAGE NOTES
Patient arrived to ER by private vehicle with .   Patient c/o diarrhea, started Sunday.   Patient c/o rectal pain.

## 2024-07-03 NOTE — PROGRESS NOTES
COLONOSCOPY N/A 09/29/2020    COLONOSCOPY WITH POLYPECTOMY performed by Sarah Regalado MD at [facility]    DILATION AND CURETTAGE OF UTERUS N/A 10/07/2019    EUA HYSTEROSCOPY DILATATION AND CURETTAGE performed by Aria Shrestha DO at [facility]    ENDOSCOPY, COLON, DIAGNOSTIC      EYE SURGERY      [place] with IOL OU / [place]    HERNIA REPAIR  2018    umbilical hernia repair    IR PORT PLACEMENT EQUAL OR GREATER THAN 5 YEARS  05/14/2021    IR PORT PLACEMENT EQUAL OR GREATER THAN 5 YEARS 5/14/2021 [facility] PROCEDURE    OTHER SURGICAL HISTORY Right 06/19/2024    port check - contrast injection performed by Dr. Ford - functionality confirmed    MD ESOPHAGOGASTRODUODENOSCOPY TRANSORAL DIAGNOSTIC N/A 03/24/2017    EGD ESOPHAGOGASTRODUODENOSCOPY performed by Joselito Senior MD at [facility]    MD LAPS ABD PRTM&OMENTUM DX W/WO SPEC BR/WA SPX N/A 02/08/2018    negative findings    MD NEUROPLASTY &/TRANSPOS MEDIAN NRV CARPAL TUNNE Left 06/05/2017    LEFT  CARPAL TUNNEL RELEASE performed by Tony Childress MD at [facility]    MD RPR UMBILICAL HRNA 5 YRS/> REDUCIBLE N/A 02/08/2018    TONSILLECTOMY      as child    TUNNELED VENOUS PORT PLACEMENT Right 05/14/2021    8 Fr Bard Power Port ClearVUE by Dr. Ford    UPPER GASTROINTESTINAL ENDOSCOPY  08/26/2016    w/bx     UPPER GASTROINTESTINAL ENDOSCOPY N/A 02/25/2020    EGD possible biopsy performed by Sarah Regalado MD at [facility]    UPPER GASTROINTESTINAL ENDOSCOPY N/A 07/01/2020    EGD PUSH ENTEROSCOPY performed by Rm Adams MD at [facility]     Social History     Socioeconomic History    Marital status:      Spouse name: Librado    Number of children: 2    Years of education: 12    Highest education level: High school graduate   Occupational History    Occupation: Retired-   Tobacco Use    Smoking status: Former     Current packs/day: 0.00     Average packs/day: 1 pack/day for 59.0 years (59.0 ttl pk-yrs)

## 2024-07-03 NOTE — ED PROVIDER NOTES
[facility] ED  eMERGENCY dEPARTMENTeNCOUnter      Pt Name: Aimee Villanueva  MRN: [id number]  Birthdate 1945  Date ofevaluation: 7/3/2024  Provider: Debbi Vee PA-C    CHIEF COMPLAINT       Chief Complaint   Patient presents with    Diarrhea         HISTORY OF PRESENT ILLNESS   (Location/Symptom, Timing/Onset,Context/Setting, Quality, Duration, Modifying Factors, Severity)  Note limiting factors.   Aimee Villanueva is a 78 y.o. female who presents to the emergency department diarrhea x 4 days.  Patient is having about 4-5 episodes of a brown-green diarrhea.  She has not having any abdominal pain but does complain of some rectal pain from all the diarrhea.  She did take Kaopectate yesterday.  She is not on any blood thinners denies any black stool history of GI bleed.  She denies any fevers chills nausea vomiting.  Unsure when her last colonoscopy is but  thinks it is greater than 5 years.  No one else with similar symptoms no recent travel.    HPI    NursingNotes were reviewed.    REVIEW OF SYSTEMS    (2-9 systems for level 4, 10 or more for level 5)     Review of Systems   Constitutional:  Negative for chills, diaphoresis, fatigue and fever.   HENT:  Negative for congestion, rhinorrhea and sore throat.    Eyes:  Negative for photophobia and pain.   Respiratory:  Negative for cough and shortness of breath.    Cardiovascular:  Negative for chest pain and palpitations.   Gastrointestinal:  Positive for diarrhea. Negative for abdominal pain, nausea and vomiting.   Genitourinary:  Negative for dysuria and flank pain.   Musculoskeletal:  Negative for back pain.   Skin:  Negative for rash.   Neurological:  Negative for dizziness, light-headedness and headaches.   Psychiatric/Behavioral: Negative.     All other systems reviewed and are negative.      Except as noted above the remainder of the review of systems was reviewed and negative.       PAST MEDICAL HISTORY     Past Medical History:

## 2024-07-04 LAB
ADV 40+41 DNA STL QL NAA+NON-PROBE: NOT DETECTED
C CAYETANENSIS DNA STL QL NAA+NON-PROBE: NOT DETECTED
C COLI+JEJ+UPSA DNA STL QL NAA+NON-PROBE: NOT DETECTED
C DIFF TOX A+B STL QL IA: NORMAL
CRYPTOSP DNA STL QL NAA+NON-PROBE: NOT DETECTED
E HISTOLYT DNA STL QL NAA+NON-PROBE: NOT DETECTED
EAEC PAA PLAS AGGR+AATA ST NAA+NON-PRB: NOT DETECTED
EC STX1+STX2 GENES STL QL NAA+NON-PROBE: NOT DETECTED
EPEC EAE GENE STL QL NAA+NON-PROBE: NOT DETECTED
ETEC LTA+ST1A+ST1B TOX ST NAA+NON-PROBE: NOT DETECTED
G LAMBLIA DNA STL QL NAA+NON-PROBE: NOT DETECTED
GI PATH DNA+RNA PNL STL NAA+NON-PROBE: NOT DETECTED
NOROVIRUS GI+II RNA STL QL NAA+NON-PROBE: NOT DETECTED
P SHIGELLOIDES DNA STL QL NAA+NON-PROBE: NOT DETECTED
RVA RNA STL QL NAA+NON-PROBE: NOT DETECTED
S ENT+BONG DNA STL QL NAA+NON-PROBE: NOT DETECTED
SAPO I+II+IV+V RNA STL QL NAA+NON-PROBE: NOT DETECTED
SHIGELLA SP+EIEC IPAH ST NAA+NON-PROBE: NOT DETECTED
V CHOL+PARA+VUL DNA STL QL NAA+NON-PROBE: NOT DETECTED
V CHOLERAE DNA STL QL NAA+NON-PROBE: NOT DETECTED
Y ENTEROCOL DNA STL QL NAA+NON-PROBE: NOT DETECTED

## 2024-07-16 ENCOUNTER — TELEPHONE (OUTPATIENT)
Dept: PALLATIVE CARE | Age: 79
End: 2024-07-16

## 2024-07-16 DIAGNOSIS — R30.0 DYSURIA: Primary | ICD-10-CM

## 2024-07-16 RX ORDER — CIPROFLOXACIN 500 MG/1
500 TABLET, FILM COATED ORAL DAILY
Qty: 6 TABLET | Refills: 0 | Status: SHIPPED | OUTPATIENT
Start: 2024-07-16 | End: 2024-07-22

## 2024-07-16 NOTE — TELEPHONE ENCOUNTER
Will send in an antibiotic but still recommend ER due to the lethargy and fever/risk for sepsis. Creat. Clearance 22 mL/min. Dose adjusted accordingly. Cipro chosen due to risk for systemic infection. Start 500 mg po daily x 6 days[facility]

## 2024-07-16 NOTE — TELEPHONE ENCOUNTER
12:22 Aimee's spouse called back, at home test was positive for UTI and she is refusing to go to the ER.            Patient  called states she has a poss UTI. Patient drowsy, smelly urine, burning with urination. Going to use a home UTI test and reporting back when complete. Temp has been between 100 and 101.

## 2024-07-24 ENCOUNTER — OFFICE VISIT (OUTPATIENT)
Dept: PALLATIVE CARE | Age: 79
End: 2024-07-24
Payer: MEDICARE

## 2024-07-24 VITALS — OXYGEN SATURATION: 94 % | SYSTOLIC BLOOD PRESSURE: 146 MMHG | HEART RATE: 75 BPM | DIASTOLIC BLOOD PRESSURE: 70 MMHG

## 2024-07-24 DIAGNOSIS — M47.26 OTHER SPONDYLOSIS WITH RADICULOPATHY, LUMBAR REGION: ICD-10-CM

## 2024-07-24 DIAGNOSIS — Z87.19 HISTORY OF GI BLEED: ICD-10-CM

## 2024-07-24 DIAGNOSIS — K59.00 CONSTIPATION, UNSPECIFIED CONSTIPATION TYPE: ICD-10-CM

## 2024-07-24 DIAGNOSIS — I50.42 CHRONIC COMBINED SYSTOLIC AND DIASTOLIC CHF (CONGESTIVE HEART FAILURE) (HCC): ICD-10-CM

## 2024-07-24 DIAGNOSIS — Z51.5 PALLIATIVE CARE ENCOUNTER: ICD-10-CM

## 2024-07-24 DIAGNOSIS — Q27.30 AVM (ARTERIOVENOUS MALFORMATION): ICD-10-CM

## 2024-07-24 DIAGNOSIS — F33.2 MODERATELY SEVERE RECURRENT MAJOR DEPRESSION (HCC): Primary | ICD-10-CM

## 2024-07-24 DIAGNOSIS — R45.86 EMOTIONAL LABILITY: ICD-10-CM

## 2024-07-24 DIAGNOSIS — F48.2 PSEUDOBULBAR AFFECT: ICD-10-CM

## 2024-07-24 DIAGNOSIS — J44.9 CHRONIC OBSTRUCTIVE PULMONARY DISEASE, UNSPECIFIED COPD TYPE (HCC): ICD-10-CM

## 2024-07-24 PROCEDURE — 1123F ACP DISCUSS/DSCN MKR DOCD: CPT | Performed by: NURSE PRACTITIONER

## 2024-07-24 PROCEDURE — 3078F DIAST BP <80 MM HG: CPT | Performed by: NURSE PRACTITIONER

## 2024-07-24 PROCEDURE — 3077F SYST BP >= 140 MM HG: CPT | Performed by: NURSE PRACTITIONER

## 2024-07-24 PROCEDURE — 99349 HOME/RES VST EST MOD MDM 40: CPT | Performed by: NURSE PRACTITIONER

## 2024-07-24 RX ORDER — ESCITALOPRAM OXALATE 5 MG/1
5 TABLET ORAL DAILY
Qty: 90 TABLET | Refills: 1 | Status: SHIPPED | OUTPATIENT
Start: 2024-07-24

## 2024-07-24 RX ORDER — PANTOPRAZOLE SODIUM 40 MG/1
TABLET, DELAYED RELEASE ORAL
Qty: 90 TABLET | Refills: 1 | Status: SHIPPED | OUTPATIENT
Start: 2024-07-24

## 2024-07-24 ASSESSMENT — ENCOUNTER SYMPTOMS
RESPIRATORY NEGATIVE: 1
BLOOD IN STOOL: 0
ABDOMINAL PAIN: 0
TROUBLE SWALLOWING: 0

## 2024-07-24 NOTE — PROGRESS NOTES
understands and is agreeable to current plan.         Due to acuity, symptomatology and high-risk medication management, I advised patient to Return in about 6 weeks (around 9/4/2024).     MDM: JOHNATHON Mancera - CNP    Collaborating physician: Dr. Balbuena

## 2024-09-03 ENCOUNTER — OFFICE VISIT (OUTPATIENT)
Dept: PALLATIVE CARE | Age: 79
End: 2024-09-03
Payer: MEDICARE

## 2024-09-03 VITALS
OXYGEN SATURATION: 94 % | DIASTOLIC BLOOD PRESSURE: 81 MMHG | WEIGHT: 131 LBS | HEART RATE: 91 BPM | SYSTOLIC BLOOD PRESSURE: 137 MMHG | BODY MASS INDEX: 26.46 KG/M2

## 2024-09-03 DIAGNOSIS — R47.81 SLURRED SPEECH: Primary | ICD-10-CM

## 2024-09-03 DIAGNOSIS — J44.9 CHRONIC OBSTRUCTIVE PULMONARY DISEASE, UNSPECIFIED COPD TYPE (HCC): ICD-10-CM

## 2024-09-03 DIAGNOSIS — Z71.89 ACP (ADVANCE CARE PLANNING): ICD-10-CM

## 2024-09-03 DIAGNOSIS — R45.86 EMOTIONAL LABILITY: ICD-10-CM

## 2024-09-03 DIAGNOSIS — Z87.19 HISTORY OF GI BLEED: ICD-10-CM

## 2024-09-03 DIAGNOSIS — Z51.5 PALLIATIVE CARE ENCOUNTER: ICD-10-CM

## 2024-09-03 DIAGNOSIS — F48.2 PSEUDOBULBAR AFFECT: ICD-10-CM

## 2024-09-03 DIAGNOSIS — R63.4 UNINTENTIONAL WEIGHT LOSS: ICD-10-CM

## 2024-09-03 DIAGNOSIS — R19.7 DIARRHEA, UNSPECIFIED TYPE: ICD-10-CM

## 2024-09-03 DIAGNOSIS — F33.2 MODERATELY SEVERE RECURRENT MAJOR DEPRESSION (HCC): ICD-10-CM

## 2024-09-03 DIAGNOSIS — I50.42 CHRONIC COMBINED SYSTOLIC AND DIASTOLIC CHF (CONGESTIVE HEART FAILURE) (HCC): ICD-10-CM

## 2024-09-03 DIAGNOSIS — Q27.30 AVM (ARTERIOVENOUS MALFORMATION): ICD-10-CM

## 2024-09-03 PROCEDURE — 3075F SYST BP GE 130 - 139MM HG: CPT | Performed by: NURSE PRACTITIONER

## 2024-09-03 PROCEDURE — 1123F ACP DISCUSS/DSCN MKR DOCD: CPT | Performed by: NURSE PRACTITIONER

## 2024-09-03 PROCEDURE — 99350 HOME/RES VST EST HIGH MDM 60: CPT | Performed by: NURSE PRACTITIONER

## 2024-09-03 PROCEDURE — 3079F DIAST BP 80-89 MM HG: CPT | Performed by: NURSE PRACTITIONER

## 2024-09-03 RX ORDER — DEXTROMETHORPHAN HYDROBROMIDE AND QUINIDINE SULFATE 20; 10 MG/1; MG/1
1 CAPSULE, GELATIN COATED ORAL SEE ADMIN INSTRUCTIONS
Qty: 60 CAPSULE | Refills: 0 | Status: SHIPPED | OUTPATIENT
Start: 2024-09-03

## 2024-09-03 RX ORDER — ESCITALOPRAM OXALATE 5 MG/1
5 TABLET ORAL DAILY
Qty: 90 TABLET | Refills: 1 | Status: SHIPPED | OUTPATIENT
Start: 2024-09-03 | End: 2024-09-03

## 2024-09-03 ASSESSMENT — ENCOUNTER SYMPTOMS
RESPIRATORY NEGATIVE: 1
CONSTIPATION: 0
BACK PAIN: 0
BLOOD IN STOOL: 0
TROUBLE SWALLOWING: 0
ABDOMINAL PAIN: 0

## 2024-09-03 NOTE — PROGRESS NOTES
Subjective:      Patient Id: Seen Aimee at home in [place] for follow-up palliative medicine visit. She was accompanied to the appointment by: spouse, Delano, and son.    Chief Complaint   Patient presents with    Emotional lability    Crying        HPI       Aimee Villanueva is a 79 y.o. female with a complex medical history that includes GI bleeding throughout the distal small intestine, AVM malformation, anxiety, asthma, CHF, COPD, CKD depression, fibromyalgia, OA, cardiac arrest, seizures, DM II, CVA, sleep apnea, and pseudobulbar affect.    General: Patient is alert and oriented to person and place. Disoriented to month and year. More trouble talking/slow speech. Per spouse, this has been going on a long time. In NAD.      CHF/COPD: Stable. No leg swelling. Following with Dr. Blair from cardiology. No trouble breathing/chest pain.     Pain: Pain has completely resolved after last injection. Prior to this, patient was having severe right low back pain that radiated down lateral right leg above her knee. Had steroid injection on 6/26/24. Yina previously d/c'd.     GI: GERD stable. No issues with bleeding. Still having intermittent diarrhea. Very poor appetite at times. Has lost weight. Currently at 131 lbs.     DM: Ongoing issues with uncontrolled hyperglycemia. Runs in the 300s regularly per spouse.     Mood: After further review of medications, called daughter who stated patient has never taken lexapro as previously ordered. Patient continues to have crying episodes in the setting of pseudobulbar affect. Was previously ordered nuedexta by neurology which helped.        Past Medical History:   Diagnosis Date    Acute combined systolic and diastolic CHF, NYHA class 1 (HCC) 03/24/2019    Adenomatous polyp of sigmoid colon     Adenomatous polyp of transverse colon     Anxiety     Asthma     dx 2019 / has smoked since age 15    Brainstem stroke (HCC)     Cardiopulmonary arrest (HCC)     CHF (congestive heart

## 2024-09-05 ENCOUNTER — TELEPHONE (OUTPATIENT)
Dept: PALLATIVE CARE | Age: 79
End: 2024-09-05

## 2024-09-05 NOTE — TELEPHONE ENCOUNTER
dextromethorphan-quiNIDine (NUEDEXTA) 20-10 MG CAPS per capsule [[id number]]    Order Details  Dose: 1 capsule Route: Oral Frequency: SEE ADMIN INSTRUCTIONS   Dispense Quantity: 60 capsule Refills: 0          Sig: Take 1 capsule by mouth See Admin Instructions 1 capsule daily times 7 days then 1 capsule twice daily.     This medication is not covered by the patient's insurance and is $1,800.00 out of pocket. They are not offering alternatives that would be allowed. Please advise.

## 2024-09-06 DIAGNOSIS — Z79.4 TYPE 2 DIABETES MELLITUS WITH OTHER SPECIFIED COMPLICATION, WITH LONG-TERM CURRENT USE OF INSULIN (HCC): ICD-10-CM

## 2024-09-06 DIAGNOSIS — E11.69 TYPE 2 DIABETES MELLITUS WITH OTHER SPECIFIED COMPLICATION, WITH LONG-TERM CURRENT USE OF INSULIN (HCC): ICD-10-CM

## 2024-09-06 LAB
ALBUMIN SERPL-MCNC: 3.7 G/DL (ref 3.5–4.6)
ALP SERPL-CCNC: 131 U/L (ref 40–130)
ALT SERPL-CCNC: 16 U/L (ref 0–33)
ANION GAP SERPL CALCULATED.3IONS-SCNC: 14 MEQ/L (ref 9–15)
AST SERPL-CCNC: 18 U/L (ref 0–35)
BILIRUB SERPL-MCNC: 0.4 MG/DL (ref 0.2–0.7)
BUN SERPL-MCNC: 22 MG/DL (ref 8–23)
CALCIUM SERPL-MCNC: 10.2 MG/DL (ref 8.5–9.9)
CHLORIDE SERPL-SCNC: 108 MEQ/L (ref 95–107)
CO2 SERPL-SCNC: 22 MEQ/L (ref 20–31)
CREAT SERPL-MCNC: 1.21 MG/DL (ref 0.5–0.9)
ESTIMATED AVERAGE GLUCOSE: 258 MG/DL
GLOBULIN SER CALC-MCNC: 3.3 G/DL (ref 2.3–3.5)
GLUCOSE SERPL-MCNC: 207 MG/DL (ref 70–99)
HBA1C MFR BLD: 10.6 % (ref 4–6)
POTASSIUM SERPL-SCNC: 4.2 MEQ/L (ref 3.4–4.9)
PROT SERPL-MCNC: 7 G/DL (ref 6.3–8)
SODIUM SERPL-SCNC: 144 MEQ/L (ref 135–144)

## 2024-09-09 ENCOUNTER — OFFICE VISIT (OUTPATIENT)
Dept: ENDOCRINOLOGY | Age: 79
End: 2024-09-09
Payer: MEDICARE

## 2024-09-09 ENCOUNTER — OFFICE VISIT (OUTPATIENT)
Dept: NEUROLOGY | Age: 79
End: 2024-09-09
Payer: MEDICARE

## 2024-09-09 VITALS
SYSTOLIC BLOOD PRESSURE: 122 MMHG | HEART RATE: 74 BPM | OXYGEN SATURATION: 97 % | BODY MASS INDEX: 26.41 KG/M2 | WEIGHT: 131 LBS | DIASTOLIC BLOOD PRESSURE: 77 MMHG | HEIGHT: 59 IN

## 2024-09-09 VITALS — SYSTOLIC BLOOD PRESSURE: 109 MMHG | DIASTOLIC BLOOD PRESSURE: 59 MMHG | HEART RATE: 79 BPM

## 2024-09-09 DIAGNOSIS — R26.9 GAIT ABNORMALITY: ICD-10-CM

## 2024-09-09 DIAGNOSIS — Z79.4 TYPE 2 DIABETES MELLITUS WITH OTHER SPECIFIED COMPLICATION, WITH LONG-TERM CURRENT USE OF INSULIN (HCC): Primary | ICD-10-CM

## 2024-09-09 DIAGNOSIS — F48.2 PSEUDOBULBAR AFFECT: Primary | ICD-10-CM

## 2024-09-09 DIAGNOSIS — H51.21 INO (INTERNUCLEAR OPHTHALMOPLEGIA), RIGHT: ICD-10-CM

## 2024-09-09 DIAGNOSIS — R41.3 MEMORY DEFICIT: Chronic | ICD-10-CM

## 2024-09-09 DIAGNOSIS — E11.69 TYPE 2 DIABETES MELLITUS WITH OTHER SPECIFIED COMPLICATION, WITH LONG-TERM CURRENT USE OF INSULIN (HCC): Primary | ICD-10-CM

## 2024-09-09 DIAGNOSIS — R51.9 INTRACTABLE HEADACHE, UNSPECIFIED CHRONICITY PATTERN, UNSPECIFIED HEADACHE TYPE: ICD-10-CM

## 2024-09-09 DIAGNOSIS — E11.65 UNCONTROLLED TYPE 2 DIABETES MELLITUS WITH HYPERGLYCEMIA (HCC): ICD-10-CM

## 2024-09-09 DIAGNOSIS — H49.02 LEFT OCULOMOTOR NERVE PALSY: ICD-10-CM

## 2024-09-09 LAB
CHP ED QC CHECK: NORMAL
GLUCOSE BLD-MCNC: 228 MG/DL

## 2024-09-09 PROCEDURE — 1123F ACP DISCUSS/DSCN MKR DOCD: CPT | Performed by: PHYSICIAN ASSISTANT

## 2024-09-09 PROCEDURE — 3074F SYST BP LT 130 MM HG: CPT | Performed by: PHYSICIAN ASSISTANT

## 2024-09-09 PROCEDURE — 3046F HEMOGLOBIN A1C LEVEL >9.0%: CPT | Performed by: PHYSICIAN ASSISTANT

## 2024-09-09 PROCEDURE — 99214 OFFICE O/P EST MOD 30 MIN: CPT | Performed by: PSYCHIATRY & NEUROLOGY

## 2024-09-09 PROCEDURE — 3078F DIAST BP <80 MM HG: CPT | Performed by: PHYSICIAN ASSISTANT

## 2024-09-09 PROCEDURE — 82962 GLUCOSE BLOOD TEST: CPT | Performed by: PHYSICIAN ASSISTANT

## 2024-09-09 PROCEDURE — 3074F SYST BP LT 130 MM HG: CPT | Performed by: PSYCHIATRY & NEUROLOGY

## 2024-09-09 PROCEDURE — 95251 CONT GLUC MNTR ANALYSIS I&R: CPT | Performed by: PHYSICIAN ASSISTANT

## 2024-09-09 PROCEDURE — 1123F ACP DISCUSS/DSCN MKR DOCD: CPT | Performed by: PSYCHIATRY & NEUROLOGY

## 2024-09-09 PROCEDURE — 3078F DIAST BP <80 MM HG: CPT | Performed by: PSYCHIATRY & NEUROLOGY

## 2024-09-09 PROCEDURE — 99214 OFFICE O/P EST MOD 30 MIN: CPT | Performed by: PHYSICIAN ASSISTANT

## 2024-09-09 RX ORDER — DEXTROMETHORPHAN HYDROBROMIDE AND QUINIDINE SULFATE 20; 10 MG/1; MG/1
1 CAPSULE, GELATIN COATED ORAL SEE ADMIN INSTRUCTIONS
Qty: 60 CAPSULE | Refills: 3 | Status: SHIPPED | OUTPATIENT
Start: 2024-09-09

## 2024-09-09 RX ORDER — INSULIN GLARGINE 100 [IU]/ML
INJECTION, SOLUTION SUBCUTANEOUS
Qty: 10 ADJUSTABLE DOSE PRE-FILLED PEN SYRINGE | Refills: 3 | Status: SHIPPED | OUTPATIENT
Start: 2024-09-09

## 2024-09-09 RX ORDER — ACYCLOVIR 400 MG/1
1 TABLET ORAL
Qty: 12 EACH | Refills: 10 | Status: SHIPPED | OUTPATIENT
Start: 2024-09-09

## 2024-09-09 RX ORDER — INSULIN LISPRO 100 [IU]/ML
INJECTION, SOLUTION INTRAVENOUS; SUBCUTANEOUS
Qty: 30 ML | Refills: 3 | Status: SHIPPED | OUTPATIENT
Start: 2024-09-09

## 2024-09-09 RX ORDER — BLOOD SUGAR DIAGNOSTIC
STRIP MISCELLANEOUS
Qty: 100 EACH | Refills: 3 | Status: SHIPPED | OUTPATIENT
Start: 2024-09-09

## 2024-09-09 RX ORDER — LANCETS
EACH MISCELLANEOUS
Qty: 100 EACH | Refills: 3 | Status: SHIPPED | OUTPATIENT
Start: 2024-09-09

## 2024-09-09 ASSESSMENT — ENCOUNTER SYMPTOMS
SORE THROAT: 0
RHINORRHEA: 0
VOMITING: 0
EYE PAIN: 0
SINUS PRESSURE: 0
COUGH: 0
EYE REDNESS: 0
ABDOMINAL PAIN: 0
WHEEZING: 0
SHORTNESS OF BREATH: 0
DIARRHEA: 0
NAUSEA: 0

## 2024-09-13 ENCOUNTER — TELEPHONE (OUTPATIENT)
Dept: PALLATIVE CARE | Age: 79
End: 2024-09-13

## 2024-09-13 DIAGNOSIS — R30.0 DYSURIA: Primary | ICD-10-CM

## 2024-09-13 RX ORDER — SODIUM CHLORIDE 0.9 % (FLUSH) 0.9 %
5-40 SYRINGE (ML) INJECTION PRN
OUTPATIENT
Start: 2024-09-13

## 2024-09-13 RX ORDER — HEPARIN SODIUM (PORCINE) LOCK FLUSH IV SOLN 100 UNIT/ML 100 UNIT/ML
500 SOLUTION INTRAVENOUS PRN
OUTPATIENT
Start: 2024-09-13

## 2024-09-13 RX ORDER — SULFAMETHOXAZOLE/TRIMETHOPRIM 800-160 MG
1 TABLET ORAL ONCE
Qty: 1 TABLET | Refills: 0 | Status: SHIPPED | OUTPATIENT
Start: 2024-09-13 | End: 2024-09-13

## 2024-09-13 RX ORDER — SULFAMETHOXAZOLE AND TRIMETHOPRIM 400; 80 MG/1; MG/1
1 TABLET ORAL 2 TIMES DAILY
Qty: 6 TABLET | Refills: 0 | Status: SHIPPED | OUTPATIENT
Start: 2024-09-14 | End: 2024-09-17

## 2024-09-20 ENCOUNTER — APPOINTMENT (OUTPATIENT)
Dept: OTOLARYNGOLOGY | Facility: CLINIC | Age: 79
End: 2024-09-20
Payer: MEDICARE

## 2024-09-20 DIAGNOSIS — H60.63 CHRONIC OTITIS EXTERNA OF BOTH EARS, UNSPECIFIED TYPE: Primary | ICD-10-CM

## 2024-09-20 PROCEDURE — 1160F RVW MEDS BY RX/DR IN RCRD: CPT | Performed by: OTOLARYNGOLOGY

## 2024-09-20 PROCEDURE — 99213 OFFICE O/P EST LOW 20 MIN: CPT | Performed by: OTOLARYNGOLOGY

## 2024-09-20 PROCEDURE — 1159F MED LIST DOCD IN RCRD: CPT | Performed by: OTOLARYNGOLOGY

## 2024-09-20 RX ORDER — MOMETASONE FUROATE 1 MG/G
CREAM TOPICAL 2 TIMES DAILY
Qty: 15 G | Refills: 2 | Status: SHIPPED | OUTPATIENT
Start: 2024-09-20 | End: 2024-09-27

## 2024-09-20 NOTE — PROGRESS NOTES
Layne Musa is a 79 y.o. year old female patient with PLUGGED EARS       Patient presents to the office today with complaints of her itchy ears.  She states that the ears are also feeling occasionally wet or moisture present.  She denies any giselle otorrhea.  She denies pain or changes in hearing.  All other ENT issues are negative.      Physical Exam:   No acute distress  The external ear structures appear normal.  Low-grade chronic otitis externa noted bilaterally.  The ear canals patent and the tympanic membranes are intact without evidence of air-fluid levels, retraction, or congenital defects.  Anterior rhinoscopy notes essentially a midline nasal septum. Examination is noted for normal healthy mucosal membranes without any evidence of lesions, polyps, or exudate. The tongue is normally mobile. There are no lesions on the gingiva, buccal, or oral mucosa. There are no oral cavity masses.  The neck is negative for mass lymphadenopathy. The trachea and parotid are clear. The thyroid bed is grossly unremarkable. The salivary gland structures are grossly unremarkable.    Assessment/Plan   1.  Chronic otitis externa    Patient seen today for assessment of ears with chronic otitis externa of bilateral ears.  At this time I recommend utilization of Elocon cream.  We will see the patient back should symptoms flare.

## 2024-09-25 ENCOUNTER — HOSPITAL ENCOUNTER (OUTPATIENT)
Dept: INFUSION THERAPY | Age: 79
Setting detail: INFUSION SERIES
Discharge: HOME | End: 2024-09-25
Payer: MEDICARE

## 2024-09-25 VITALS
TEMPERATURE: 98.8 F | RESPIRATION RATE: 18 BRPM | SYSTOLIC BLOOD PRESSURE: 141 MMHG | DIASTOLIC BLOOD PRESSURE: 67 MMHG | WEIGHT: 134 LBS | HEART RATE: 81 BPM | OXYGEN SATURATION: 98 % | BODY MASS INDEX: 27.06 KG/M2

## 2024-09-25 DIAGNOSIS — Z95.828 PORT-A-CATH IN PLACE: Primary | ICD-10-CM

## 2024-09-25 PROCEDURE — 96523 IRRIG DRUG DELIVERY DEVICE: CPT

## 2024-09-25 PROCEDURE — 6360000002 HC RX W HCPCS: Performed by: NURSE PRACTITIONER

## 2024-09-25 RX ORDER — SODIUM CHLORIDE 0.9 % (FLUSH) 0.9 %
5-40 SYRINGE (ML) INJECTION PRN
OUTPATIENT
Start: 2024-09-28

## 2024-09-25 RX ORDER — HEPARIN 100 UNIT/ML
500 SYRINGE INTRAVENOUS PRN
OUTPATIENT
Start: 2024-09-28

## 2024-09-25 RX ORDER — HEPARIN 100 UNIT/ML
500 SYRINGE INTRAVENOUS PRN
Status: DISCONTINUED | OUTPATIENT
Start: 2024-09-25 | End: 2024-09-26 | Stop reason: HOSPADM

## 2024-09-25 RX ADMIN — Medication 500 UNITS: at 13:33

## 2024-09-25 NOTE — PROGRESS NOTES
1330: patient arrived to room and assisted to chair. Vitals obtained.     1335: port accessed and flushed with NS with blood return then flush. Port then flushed with heparin flush per order and deaccessed. Patient yelling out during insertion due to pain. Was able to calm down after needle taken out. Patient was assisted back to w/c via family and taken to personal vehicle. Room was and equipment was cleaned.

## 2024-10-03 ENCOUNTER — HOSPITAL ENCOUNTER (EMERGENCY)
Age: 79
Discharge: HOME | End: 2024-10-03
Payer: MEDICARE

## 2024-10-03 ENCOUNTER — APPOINTMENT (OUTPATIENT)
Dept: CT IMAGING | Age: 79
End: 2024-10-03
Payer: MEDICARE

## 2024-10-03 ENCOUNTER — APPOINTMENT (OUTPATIENT)
Dept: GENERAL RADIOLOGY | Age: 79
End: 2024-10-03
Payer: MEDICARE

## 2024-10-03 VITALS
HEIGHT: 59 IN | OXYGEN SATURATION: 99 % | WEIGHT: 134 LBS | DIASTOLIC BLOOD PRESSURE: 79 MMHG | BODY MASS INDEX: 27.01 KG/M2 | SYSTOLIC BLOOD PRESSURE: 168 MMHG | HEART RATE: 74 BPM | TEMPERATURE: 97 F | RESPIRATION RATE: 18 BRPM

## 2024-10-03 DIAGNOSIS — R07.9 CHEST PAIN, UNSPECIFIED TYPE: ICD-10-CM

## 2024-10-03 DIAGNOSIS — I63.81 THALAMIC INFARCTION (HCC): Primary | ICD-10-CM

## 2024-10-03 DIAGNOSIS — R41.82 ALTERED MENTAL STATUS, UNSPECIFIED ALTERED MENTAL STATUS TYPE: ICD-10-CM

## 2024-10-03 DIAGNOSIS — R79.89 ELEVATED TROPONIN: ICD-10-CM

## 2024-10-03 LAB
ALBUMIN SERPL-MCNC: 3.9 G/DL (ref 3.5–4.6)
ALP SERPL-CCNC: 126 U/L (ref 40–130)
ALT SERPL-CCNC: 17 U/L (ref 0–33)
ANION GAP SERPL CALCULATED.3IONS-SCNC: 9 MEQ/L (ref 9–15)
APTT PPP: 31.5 SEC (ref 24.4–36.8)
AST SERPL-CCNC: 18 U/L (ref 0–35)
BACTERIA URNS QL MICRO: ABNORMAL /HPF
BASOPHILS # BLD: 0.1 K/UL (ref 0–0.2)
BASOPHILS NFR BLD: 1.3 %
BILIRUB SERPL-MCNC: 0.5 MG/DL (ref 0.2–0.7)
BILIRUB UR QL STRIP: NEGATIVE
BUN SERPL-MCNC: 16 MG/DL (ref 8–23)
CALCIUM SERPL-MCNC: 10 MG/DL (ref 8.5–9.9)
CHLORIDE SERPL-SCNC: 105 MEQ/L (ref 95–107)
CLARITY UR: ABNORMAL
CO2 SERPL-SCNC: 25 MEQ/L (ref 20–31)
COLOR UR: YELLOW
CREAT SERPL-MCNC: 1.4 MG/DL (ref 0.5–0.9)
EOSINOPHIL # BLD: 0.4 K/UL (ref 0–0.7)
EOSINOPHIL NFR BLD: 6.7 %
EPI CELLS #/AREA URNS AUTO: ABNORMAL /HPF (ref 0–5)
ERYTHROCYTE [DISTWIDTH] IN BLOOD BY AUTOMATED COUNT: 15.5 % (ref 11.5–14.5)
GLOBULIN SER CALC-MCNC: 3 G/DL (ref 2.3–3.5)
GLUCOSE SERPL-MCNC: 161 MG/DL (ref 70–99)
GLUCOSE UR STRIP-MCNC: 500 MG/DL
HCT VFR BLD AUTO: 42.2 % (ref 37–47)
HGB BLD-MCNC: 13.4 G/DL (ref 12–16)
HGB UR QL STRIP: ABNORMAL
HYALINE CASTS #/AREA URNS AUTO: ABNORMAL /HPF (ref 0–5)
INR PPP: 1
KETONES UR STRIP-MCNC: NEGATIVE MG/DL
LEUKOCYTE ESTERASE UR QL STRIP: NEGATIVE
LYMPHOCYTES # BLD: 1.2 K/UL (ref 1–4.8)
LYMPHOCYTES NFR BLD: 23.2 %
MAGNESIUM SERPL-MCNC: 2 MG/DL (ref 1.7–2.4)
MCH RBC QN AUTO: 27 PG (ref 27–31.3)
MCHC RBC AUTO-ENTMCNC: 31.8 % (ref 33–37)
MCV RBC AUTO: 85.1 FL (ref 79.4–94.8)
MONOCYTES # BLD: 0.4 K/UL (ref 0.2–0.8)
MONOCYTES NFR BLD: 7.9 %
NEUTROPHILS # BLD: 3.2 K/UL (ref 1.4–6.5)
NEUTS SEG NFR BLD: 60.5 %
NITRITE UR QL STRIP: NEGATIVE
PH UR STRIP: 5.5 [PH] (ref 5–9)
PLATELET # BLD AUTO: 327 K/UL (ref 130–400)
POTASSIUM SERPL-SCNC: 3.9 MEQ/L (ref 3.4–4.9)
PROT SERPL-MCNC: 6.9 G/DL (ref 6.3–8)
PROT UR STRIP-MCNC: >=300 MG/DL
PROTHROMBIN TIME: 13.4 SEC (ref 12.3–14.9)
RBC # BLD AUTO: 4.96 M/UL (ref 4.2–5.4)
RBC #/AREA URNS AUTO: ABNORMAL /HPF (ref 0–5)
SARS-COV-2 RDRP RESP QL NAA+PROBE: NOT DETECTED
SODIUM SERPL-SCNC: 139 MEQ/L (ref 135–144)
SP GR UR STRIP: 1.02 (ref 1–1.03)
TROPONIN, HIGH SENSITIVITY: 73 NG/L (ref 0–19)
TROPONIN, HIGH SENSITIVITY: 77 NG/L (ref 0–19)
TSH SERPL-MCNC: 0.59 UIU/ML (ref 0.44–3.86)
URINE REFLEX TO CULTURE: ABNORMAL
UROBILINOGEN UR STRIP-ACNC: 0.2 E.U./DL
WBC # BLD AUTO: 5.4 K/UL (ref 4.8–10.8)
WBC #/AREA URNS AUTO: ABNORMAL /HPF (ref 0–5)

## 2024-10-03 PROCEDURE — 70450 CT HEAD/BRAIN W/O DYE: CPT

## 2024-10-03 PROCEDURE — 85730 THROMBOPLASTIN TIME PARTIAL: CPT

## 2024-10-03 PROCEDURE — 81001 URINALYSIS AUTO W/SCOPE: CPT

## 2024-10-03 PROCEDURE — 84484 ASSAY OF TROPONIN QUANT: CPT

## 2024-10-03 PROCEDURE — 71045 X-RAY EXAM CHEST 1 VIEW: CPT

## 2024-10-03 PROCEDURE — 83735 ASSAY OF MAGNESIUM: CPT

## 2024-10-03 PROCEDURE — 87635 SARS-COV-2 COVID-19 AMP PRB: CPT

## 2024-10-03 PROCEDURE — 93005 ELECTROCARDIOGRAM TRACING: CPT

## 2024-10-03 PROCEDURE — 80053 COMPREHEN METABOLIC PANEL: CPT

## 2024-10-03 PROCEDURE — 36415 COLL VENOUS BLD VENIPUNCTURE: CPT

## 2024-10-03 PROCEDURE — 85610 PROTHROMBIN TIME: CPT

## 2024-10-03 PROCEDURE — 85025 COMPLETE CBC W/AUTO DIFF WBC: CPT

## 2024-10-03 PROCEDURE — 99285 EMERGENCY DEPT VISIT HI MDM: CPT

## 2024-10-03 PROCEDURE — 84443 ASSAY THYROID STIM HORMONE: CPT

## 2024-10-03 RX ORDER — MECLIZINE HYDROCHLORIDE 25 MG/1
25 TABLET ORAL 3 TIMES DAILY PRN
Qty: 15 TABLET | Refills: 0 | Status: SHIPPED | OUTPATIENT
Start: 2024-10-03 | End: 2024-10-13

## 2024-10-03 ASSESSMENT — ENCOUNTER SYMPTOMS
ABDOMINAL DISTENTION: 0
COUGH: 1
EYE DISCHARGE: 0
VOMITING: 0
VOICE CHANGE: 0
NAUSEA: 0
ANAL BLEEDING: 0

## 2024-10-03 NOTE — ED PROVIDER NOTES
draw second troponin [GR]   1618 SARS-CoV-2, NAAT: Not Detected [GR]   1618 CMP [GR]   1618 TSH [GR]   1618 Troponin [GR]   1618 Magnesium [GR]   1635 Troponin, High Sensitivity(!!): 77 [GR]      ED Course User Index  [GR] Josh Patel PA-C         CRITICAL CARE TIME       CONSULTS:  None    PROCEDURES:  Unless otherwise noted below, none     Procedures      FINAL IMPRESSION      1. Thalamic infarction (HCC)    2. Elevated troponin    3. Altered mental status, unspecified altered mental status type    4. Chest pain, unspecified type          DISPOSITION/PLAN   DISPOSITION Decision To Discharge 10/03/2024 04:56:46 PM      PATIENT REFERRED TO:  Ryan Guerrero MD  [address] [id number]  [phone]    Call       Scot Blair MD  [address]  [address]  [facility] [address]  [phone]    Call in 1 day      Mehul Bingham MD  [address]  [address]  [facility] [address]  [phone]    Call in 2 days        DISCHARGE MEDICATIONS:  New Prescriptions    MECLIZINE (ANTIVERT) 25 MG TABLET    Take 1 tablet by mouth 3 times daily as needed for Dizziness     Controlled Substances Monitoring:     RX Monitoring Periodic Controlled Substance Monitoring Chronic Pain > 50 MEDD   1/10/2023  10:21 AM No signs of potential drug abuse or diversion identified. Re-evaluated the status of the patient's underlying condition causing pain.       (Please note that portions of this note were completed with a voice recognition program.  Efforts were made to edit the dictations but occasionally words are mis-transcribed.)    Josh Patel PA-C (electronically signed)  Attending Emergency Physician    Supervising Attending Physician: Dr. Conde.      Josh Patel PA-C  10/03/24 [id number]

## 2024-10-03 NOTE — ED TRIAGE NOTES
Patient brought by family for intermittent \"blacking out spells\" over the last few days with intermittent confusion for the last year. Patient reported to family today she was dizzy and had a headache. Patient states name but will not respond otherwise to this RN in triage.

## 2024-10-03 NOTE — DISCHARGE INSTRUCTIONS
Follow-up with primary care, cardiology, neurology and palliative care.  Return to if any symptoms worsen or new symptoms but

## 2024-10-04 LAB
EKG ATRIAL RATE: 66 BPM
EKG P AXIS: 30 DEGREES
EKG P-R INTERVAL: 142 MS
EKG Q-T INTERVAL: 414 MS
EKG QRS DURATION: 94 MS
EKG QTC CALCULATION (BAZETT): 434 MS
EKG R AXIS: 41 DEGREES
EKG T AXIS: 175 DEGREES
EKG VENTRICULAR RATE: 66 BPM

## 2024-10-05 DIAGNOSIS — I50.41 ACUTE COMBINED SYSTOLIC AND DIASTOLIC CHF, NYHA CLASS 1 (HCC): ICD-10-CM

## 2024-10-05 DIAGNOSIS — E78.00 PURE HYPERCHOLESTEROLEMIA: ICD-10-CM

## 2024-10-06 RX ORDER — DIGOXIN 125 MCG
125 TABLET ORAL EVERY OTHER DAY
Qty: 15 TABLET | Refills: 0 | Status: SHIPPED | OUTPATIENT
Start: 2024-10-06

## 2024-10-06 RX ORDER — AMLODIPINE BESYLATE 5 MG/1
5 TABLET ORAL DAILY
Qty: 30 TABLET | Refills: 0 | Status: SHIPPED | OUTPATIENT
Start: 2024-10-06

## 2024-10-07 RX ORDER — ROSUVASTATIN CALCIUM 10 MG/1
10 TABLET, COATED ORAL DAILY
Qty: 90 TABLET | Refills: 0 | Status: SHIPPED | OUTPATIENT
Start: 2024-10-07

## 2024-10-07 NOTE — TELEPHONE ENCOUNTER
Pharmacy requesting medication refill. Please approve or deny this request.    Rx requested:  Requested Prescriptions     Pending Prescriptions Disp Refills    rosuvastatin (CRESTOR) 10 MG tablet [Pharmacy Med Name: Rosuvastatin Calcium 10 MG Oral Tablet] 90 tablet 0     Sig: Take 1 tablet by mouth once daily         Last Office Visit:   1/30/2024      Next Visit Date:  Future Appointments   Date Time Provider Department Center   10/30/2024  3:00 PM Melanie Dumont APRN - CNP [facility]   12/9/2024  1:30 PM Rojo, Valentin S, PA [facility]   9/8/2025  1:00 PM Mehul Bingham MD [facility] -

## 2024-10-24 ENCOUNTER — OFFICE VISIT (OUTPATIENT)
Dept: CARDIOLOGY CLINIC | Age: 79
End: 2024-10-24
Payer: MEDICARE

## 2024-10-24 VITALS
SYSTOLIC BLOOD PRESSURE: 148 MMHG | WEIGHT: 136.4 LBS | HEART RATE: 69 BPM | RESPIRATION RATE: 16 BRPM | BODY MASS INDEX: 27.55 KG/M2 | DIASTOLIC BLOOD PRESSURE: 70 MMHG | OXYGEN SATURATION: 99 %

## 2024-10-24 DIAGNOSIS — I42.1 HOCM (HYPERTROPHIC OBSTRUCTIVE CARDIOMYOPATHY) (HCC): ICD-10-CM

## 2024-10-24 DIAGNOSIS — K92.2 GASTROINTESTINAL HEMORRHAGE, UNSPECIFIED GASTROINTESTINAL HEMORRHAGE TYPE: ICD-10-CM

## 2024-10-24 DIAGNOSIS — N18.4 STAGE 4 CHRONIC KIDNEY DISEASE (HCC): ICD-10-CM

## 2024-10-24 DIAGNOSIS — I10 PRIMARY HYPERTENSION: ICD-10-CM

## 2024-10-24 DIAGNOSIS — I50.42 CHRONIC COMBINED SYSTOLIC AND DIASTOLIC CHF (CONGESTIVE HEART FAILURE) (HCC): Primary | ICD-10-CM

## 2024-10-24 DIAGNOSIS — I10 ESSENTIAL HYPERTENSION: ICD-10-CM

## 2024-10-24 DIAGNOSIS — I50.41 ACUTE COMBINED SYSTOLIC AND DIASTOLIC CHF, NYHA CLASS 1 (HCC): ICD-10-CM

## 2024-10-24 DIAGNOSIS — R09.89 BILATERAL CAROTID BRUITS: ICD-10-CM

## 2024-10-24 DIAGNOSIS — I25.10 CORONARY ARTERY DISEASE INVOLVING NATIVE CORONARY ARTERY OF NATIVE HEART WITHOUT ANGINA PECTORIS: ICD-10-CM

## 2024-10-24 DIAGNOSIS — E78.2 MIXED HYPERLIPIDEMIA: ICD-10-CM

## 2024-10-24 PROCEDURE — 3078F DIAST BP <80 MM HG: CPT | Performed by: INTERNAL MEDICINE

## 2024-10-24 PROCEDURE — 3077F SYST BP >= 140 MM HG: CPT | Performed by: INTERNAL MEDICINE

## 2024-10-24 PROCEDURE — 99214 OFFICE O/P EST MOD 30 MIN: CPT | Performed by: INTERNAL MEDICINE

## 2024-10-24 PROCEDURE — 1159F MED LIST DOCD IN RCRD: CPT | Performed by: INTERNAL MEDICINE

## 2024-10-24 PROCEDURE — 1123F ACP DISCUSS/DSCN MKR DOCD: CPT | Performed by: INTERNAL MEDICINE

## 2024-10-24 RX ORDER — ASPIRIN 81 MG/1
81 TABLET ORAL DAILY
Qty: 90 TABLET | Refills: 3
Start: 2024-10-24

## 2024-10-24 RX ORDER — LOSARTAN POTASSIUM 50 MG/1
50 TABLET ORAL DAILY
COMMUNITY

## 2024-10-24 ASSESSMENT — ENCOUNTER SYMPTOMS
BLOOD IN STOOL: 0
STRIDOR: 0
GASTROINTESTINAL NEGATIVE: 1
CHEST TIGHTNESS: 0
EYES NEGATIVE: 1
WHEEZING: 0
RESPIRATORY NEGATIVE: 1
COUGH: 0
SHORTNESS OF BREATH: 0
NAUSEA: 0

## 2024-10-24 NOTE — PROGRESS NOTES
LABGLOM 32.6 10/17/2024 11:56 AM    LABGLOM 23.5 02/01/2024 01:10 PM    GLUCOSE 299 10/17/2024 11:56 AM    CALCIUM 9.7 10/17/2024 11:56 AM    BILITOT 0.5 10/03/2024 03:49 PM    ALKPHOS 126 10/03/2024 03:49 PM    AST 18 10/03/2024 03:49 PM    ALT 17 10/03/2024 03:49 PM     BMP:    Lab Results   Component Value Date/Time     10/17/2024 11:56 AM    K 4.1 10/17/2024 11:56 AM    K 4.1 05/11/2022 06:00 AM     10/17/2024 11:56 AM    CO2 26 10/17/2024 11:56 AM    BUN 19 10/17/2024 11:56 AM    CREATININE 1.59 10/17/2024 11:56 AM    CALCIUM 9.7 10/17/2024 11:56 AM    GFRAA 31.1 05/18/2022 10:56 AM    LABGLOM 32.6 10/17/2024 11:56 AM    LABGLOM 23.5 02/01/2024 01:10 PM    GLUCOSE 299 10/17/2024 11:56 AM     Magnesium:    Lab Results   Component Value Date/Time    MG 2.0 10/03/2024 03:49 PM     TSH:  Lab Results   Component Value Date    TSH 0.595 10/03/2024       Patient Active Problem List   Diagnosis    Hypertension    Hyperlipidemia    Fibromyalgia    Anxiety    Depression    DJD (degenerative joint disease), lumbar    Lumbosacral radiculopathy at S1    Dysphonia    Bilateral carpal tunnel syndrome    High risk medication use-Norco - 12/20/17 [facility] PM&R, 02/20/18 [facility] PM&R, 03/07/18 [facility] PM&R, Urine Drug screen negative 02/06/17 PM&R--MED CONTRACT 2/6/17    SOB (shortness of breath) on exertion    Memory deficit    Chronic UTI    Artificial lens present    Presbyopia    Astigmatism, regular    Cataract, nuclear sclerotic senile    Diabetes mellitus (HCC)    Regular astigmatism    Nuclear senile cataract    Lumbosacral radiculopathy at L5    Spinal stenosis of lumbar region with neurogenic claudication    Impaired mobility and activities of daily living    Diabetic asymmetric polyneuropathy (HCC)    Myalgia    Uncontrolled type 2 diabetes mellitus with hyperglycemia (HCC)    Chronic obstructive pulmonary disease (HCC)    HOCM (hypertrophic obstructive cardiomyopathy) (HCC)    Anemia    AVM (arteriovenous

## 2024-10-30 ENCOUNTER — OFFICE VISIT (OUTPATIENT)
Dept: PALLATIVE CARE | Age: 79
End: 2024-10-30
Payer: MEDICARE

## 2024-10-30 VITALS — OXYGEN SATURATION: 98 % | HEART RATE: 89 BPM | DIASTOLIC BLOOD PRESSURE: 70 MMHG | SYSTOLIC BLOOD PRESSURE: 170 MMHG

## 2024-10-30 DIAGNOSIS — I10 HYPERTENSION, UNSPECIFIED TYPE: ICD-10-CM

## 2024-10-30 DIAGNOSIS — Z51.5 PALLIATIVE CARE ENCOUNTER: ICD-10-CM

## 2024-10-30 DIAGNOSIS — Z87.19 HISTORY OF GI BLEED: ICD-10-CM

## 2024-10-30 DIAGNOSIS — F48.2 PSEUDOBULBAR AFFECT: ICD-10-CM

## 2024-10-30 DIAGNOSIS — R19.7 DIARRHEA, UNSPECIFIED TYPE: ICD-10-CM

## 2024-10-30 DIAGNOSIS — R63.4 UNINTENTIONAL WEIGHT LOSS: ICD-10-CM

## 2024-10-30 DIAGNOSIS — R45.86 EMOTIONAL LABILITY: ICD-10-CM

## 2024-10-30 DIAGNOSIS — J44.9 CHRONIC OBSTRUCTIVE PULMONARY DISEASE, UNSPECIFIED COPD TYPE (HCC): ICD-10-CM

## 2024-10-30 DIAGNOSIS — I50.42 CHRONIC COMBINED SYSTOLIC AND DIASTOLIC CHF (CONGESTIVE HEART FAILURE) (HCC): Primary | ICD-10-CM

## 2024-10-30 DIAGNOSIS — Q27.30 AVM (ARTERIOVENOUS MALFORMATION): ICD-10-CM

## 2024-10-30 DIAGNOSIS — F33.2 MODERATELY SEVERE RECURRENT MAJOR DEPRESSION (HCC): ICD-10-CM

## 2024-10-30 PROCEDURE — 3077F SYST BP >= 140 MM HG: CPT | Performed by: NURSE PRACTITIONER

## 2024-10-30 PROCEDURE — 99349 HOME/RES VST EST MOD MDM 40: CPT | Performed by: NURSE PRACTITIONER

## 2024-10-30 PROCEDURE — 1123F ACP DISCUSS/DSCN MKR DOCD: CPT | Performed by: NURSE PRACTITIONER

## 2024-10-30 PROCEDURE — 3078F DIAST BP <80 MM HG: CPT | Performed by: NURSE PRACTITIONER

## 2024-10-30 ASSESSMENT — ENCOUNTER SYMPTOMS
RESPIRATORY NEGATIVE: 1
TROUBLE SWALLOWING: 0
CONSTIPATION: 0
BLOOD IN STOOL: 0
DIARRHEA: 1
ABDOMINAL PAIN: 0
BACK PAIN: 0

## 2024-10-30 NOTE — PROGRESS NOTES
kit 0    glucose 4 g chewable tablet Take 4 tablets by mouth as needed for Low blood sugar 60 tablet 3    blood glucose monitor kit and supplies Give 1 meter covered by insurance 1 kit 0    Handicap Placard MISC by Does not apply route Good from 10/6/22 till 10/6/27 1 each 0    Glucagon (GVOKE HYPOPEN 2-PACK) 1 MG/0.2ML SOAJ Inject 1 mg into the skin every 15 minutes as needed (glucose less tahtn 70 or if symptomatic) 2 each 3    albuterol-ipratropium (COMBIVENT RESPIMAT)  MCG/ACT AERS inhaler Inhale 1 puff into the lungs every 6 hours as needed for Wheezing or Shortness of Breath 4 g 5    ipratropium-albuterol (DUONEB) 0.5-2.5 (3) MG/3ML SOLN nebulizer solution Inhale 3 mLs into the lungs 3 times daily (Patient taking differently: Inhale 3 mLs into the lungs 3 times daily as needed) 360 mL 0    Cholecalciferol (VITAMIN D3) 50 MCG (2000 UT) CAPS Take 1,000 Units by mouth 2 times daily       Blood Glucose Monitoring Suppl (ACCU-CHEK PHYLLIS CONNECT) w/Device KIT 1 kit by Does not apply route daily 1 kit 0     No current facility-administered medications on file prior to visit.     Review of Systems   Constitutional:  Negative for activity change, appetite change and unexpected weight change.   HENT:  Negative for trouble swallowing.    Respiratory: Negative.     Cardiovascular: Negative.    Gastrointestinal:  Positive for diarrhea. Negative for abdominal pain, blood in stool and constipation.   Genitourinary: Negative.    Musculoskeletal:  Positive for gait problem. Negative for back pain and myalgias.   Skin: Negative.    Neurological:  Positive for weakness (Baseline.). Negative for speech difficulty (improved).   Psychiatric/Behavioral:  Negative for dysphoric mood and suicidal ideas.           Objective:   BP (!) 170/70   Pulse 89   LMP 09/30/1995   SpO2 98%    Wt Readings from Last 3 Encounters:   10/24/24 61.9 kg (136 lb 6.4 oz)   10/03/24 60.8 kg (134 lb)   09/25/24 60.8 kg (134 lb)     Physical

## 2024-11-12 ENCOUNTER — HOSPITAL ENCOUNTER (OUTPATIENT)
Dept: ULTRASOUND IMAGING | Age: 79
Discharge: HOME | End: 2024-11-14
Attending: INTERNAL MEDICINE
Payer: MEDICARE

## 2024-11-12 DIAGNOSIS — R09.89 BILATERAL CAROTID BRUITS: ICD-10-CM

## 2024-11-12 LAB
VAS LEFT CCA DIST EDV: 10.6 CM/S
VAS LEFT CCA DIST PSV: 70.8 CM/S
VAS LEFT CCA MID EDV: 13 CM/S
VAS LEFT CCA MID PSV: 83.2 CM/S
VAS LEFT CCA PROX EDV: 16.8 CM/S
VAS LEFT CCA PROX PSV: 100 CM/S
VAS LEFT ECA EDV: 11.2 CM/S
VAS LEFT ECA PSV: 100 CM/S
VAS LEFT ICA DIST EDV: 28.6 CM/S
VAS LEFT ICA DIST PSV: 165 CM/S
VAS LEFT ICA MID EDV: 13.7 CM/S
VAS LEFT ICA MID PSV: 69 CM/S
VAS LEFT ICA PROX EDV: 16.8 CM/S
VAS LEFT ICA PROX PSV: 87.6 CM/S
VAS LEFT ICA/CCA PSV: 1.99
VAS LEFT VERTEBRAL EDV: 14.7 CM/S
VAS LEFT VERTEBRAL PSV: 84.9 CM/S
VAS RIGHT CCA DIST EDV: 9.2 CM/S
VAS RIGHT CCA DIST PSV: 63.7 CM/S
VAS RIGHT CCA MID EDV: 12.3 CM/S
VAS RIGHT CCA MID PSV: 90.9 CM/S
VAS RIGHT CCA PROX EDV: 9.7 CM/S
VAS RIGHT CCA PROX PSV: 71.1 CM/S
VAS RIGHT ECA EDV: 6.15 CM/S
VAS RIGHT ECA PSV: 74.6 CM/S
VAS RIGHT ICA DIST EDV: 5.6 CM/S
VAS RIGHT ICA DIST PSV: 39.2 CM/S
VAS RIGHT ICA MID EDV: 10.1 CM/S
VAS RIGHT ICA MID PSV: 78.1 CM/S
VAS RIGHT ICA PROX EDV: 11.4 CM/S
VAS RIGHT ICA PROX PSV: 65.4 CM/S
VAS RIGHT ICA/CCA PSV: 0.86
VAS RIGHT VERTEBRAL EDV: 6.66 CM/S
VAS RIGHT VERTEBRAL PSV: 44.1 CM/S

## 2024-11-12 PROCEDURE — 93880 EXTRACRANIAL BILAT STUDY: CPT

## 2024-11-22 DIAGNOSIS — I50.41 ACUTE COMBINED SYSTOLIC AND DIASTOLIC CHF, NYHA CLASS 1 (HCC): ICD-10-CM

## 2024-11-22 RX ORDER — AMLODIPINE BESYLATE 5 MG/1
TABLET ORAL
Qty: 30 TABLET | Refills: 0 | Status: SHIPPED | OUTPATIENT
Start: 2024-11-22

## 2024-11-22 RX ORDER — DIGOXIN 125 MCG
TABLET ORAL
Qty: 15 TABLET | Refills: 0 | Status: SHIPPED | OUTPATIENT
Start: 2024-11-22

## 2024-11-22 NOTE — TELEPHONE ENCOUNTER
Requesting medication refill. Please approve or deny this request.    Rx requested:  Requested Prescriptions     Pending Prescriptions Disp Refills    amLODIPine (NORVASC) 5 MG tablet [Pharmacy Med Name: amLODIPine Besylate 5 MG Oral Tablet] 30 tablet 0     Sig: TAKE 1 TABLET BY MOUTH ONCE DAILY. PATIENT NEEDS AN APPOINTMENT FOR MORE REFILLS    digoxin (LANOXIN) 125 MCG tablet [Pharmacy Med Name: Digoxin 125 MCG Oral Tablet] 15 tablet 0     Sig: TAKE 1 TABLET BY MOUTH EVERY OTHER DAY. PATIENT MUST MAKE APPOINTMENT FOR MORE REFILLS         Last Office Visit:   10/24/2024      Next Visit Date:  Future Appointments   Date Time Provider Department Center   12/9/2024  1:30 PM Rojo, Valentin S, PA [facility]   12/11/2024  2:00 PM Melanie Dumont APRN - CNP [facility]   4/24/2025  2:45 PM Scot Blair MD [facility]   9/8/2025  1:00 PM Mehul Bingham MD [facility] -

## 2024-12-02 ENCOUNTER — TELEMEDICINE (OUTPATIENT)
Dept: FAMILY MEDICINE CLINIC | Age: 79
End: 2024-12-02

## 2024-12-02 DIAGNOSIS — Z00.00 MEDICARE ANNUAL WELLNESS VISIT, SUBSEQUENT: Primary | ICD-10-CM

## 2024-12-02 ASSESSMENT — PATIENT HEALTH QUESTIONNAIRE - PHQ9
SUM OF ALL RESPONSES TO PHQ QUESTIONS 1-9: 0
SUM OF ALL RESPONSES TO PHQ9 QUESTIONS 1 & 2: 0
5. POOR APPETITE OR OVEREATING: NOT AT ALL
7. TROUBLE CONCENTRATING ON THINGS, SUCH AS READING THE NEWSPAPER OR WATCHING TELEVISION: NOT AT ALL
6. FEELING BAD ABOUT YOURSELF - OR THAT YOU ARE A FAILURE OR HAVE LET YOURSELF OR YOUR FAMILY DOWN: NOT AT ALL
9. THOUGHTS THAT YOU WOULD BE BETTER OFF DEAD, OR OF HURTING YOURSELF: NOT AT ALL
10. IF YOU CHECKED OFF ANY PROBLEMS, HOW DIFFICULT HAVE THESE PROBLEMS MADE IT FOR YOU TO DO YOUR WORK, TAKE CARE OF THINGS AT HOME, OR GET ALONG WITH OTHER PEOPLE: NOT DIFFICULT AT ALL
2. FEELING DOWN, DEPRESSED OR HOPELESS: NOT AT ALL
3. TROUBLE FALLING OR STAYING ASLEEP: NOT AT ALL
SUM OF ALL RESPONSES TO PHQ QUESTIONS 1-9: 0
1. LITTLE INTEREST OR PLEASURE IN DOING THINGS: NOT AT ALL
SUM OF ALL RESPONSES TO PHQ QUESTIONS 1-9: 0
SUM OF ALL RESPONSES TO PHQ QUESTIONS 1-9: 0
8. MOVING OR SPEAKING SO SLOWLY THAT OTHER PEOPLE COULD HAVE NOTICED. OR THE OPPOSITE, BEING SO FIGETY OR RESTLESS THAT YOU HAVE BEEN MOVING AROUND A LOT MORE THAN USUAL: NOT AT ALL
4. FEELING TIRED OR HAVING LITTLE ENERGY: NOT AT ALL

## 2024-12-02 ASSESSMENT — LIFESTYLE VARIABLES
HOW MANY STANDARD DRINKS CONTAINING ALCOHOL DO YOU HAVE ON A TYPICAL DAY: PATIENT DOES NOT DRINK
HOW OFTEN DO YOU HAVE A DRINK CONTAINING ALCOHOL: NEVER

## 2024-12-02 NOTE — PATIENT INSTRUCTIONS
care team, or counselor.  People with the same health concern.  Your local Islam, Congregation, Islam, or other Adventism group.  A city, state, or national group that provides support for your health concern. Check your local library or community center for a list of these groups. Or look for information online.  Your local community, friends, and family.  Supportive relationships  A supportive relationship includes emotional support such as love, trust, and understanding, as well as advice and concrete help, such as help managing your time.  Reach out to others  Family and friends can help you. Ask them to:  Listen to you and give you encouragement. This can keep you from feeling hopeless or alone.  Help with small daily tasks or with bigger problems. A helping hand can keep you from feeling overwhelmed.  Help you manage a health problem. For example, ask them to go to doctor visits with you. Your loved ones can offer support by being involved in your medical care.  Respect your relationships  A good relationship is also a two-way street. You count on help from others, but they also count on you.  Know your friends' limits. You don't have to see or call your friends every day. If you are going through a rough patch, ask friends if you can contact them outside of the usual boundaries.  Don't always complain or talk about yourself. Know when it's time to stop talking and listen or just enjoy your friend's company.  Know that good friends can be a bad influence. For example, if a friend encourages you to drink when you know it will harm you, you may want to end the friendship.  Where can you learn more?  Go to https://www.healthMotorpaneer.net/patientEd and enter G092 to learn more about \"Learning About Emotional Support.\"  Current as of: June 24, 2023  Content Version: 14.2  © 2024 [employer].   Care instructions adapted under license by [employer]. If you have questions about a medical condition or this

## 2024-12-02 NOTE — PROGRESS NOTES
Medicare Annual Wellness Visit    Aimee Villanueva is here for Medicare AWV (awv)    Assessment & Plan   Medicare annual wellness visit, subsequent    Recommendations for Preventive Services Due: see orders and patient instructions/AVS.  Recommended screening schedule for the next 5-10 years is provided to the patient in written form: see Patient Instructions/AVS.     Return in 1 year (on 12/2/2025) for Medicare AWV.     Subjective       Patient's complete Health Risk Assessment and screening values have been reviewed and are found in Flowsheets. The following problems were reviewed today and where indicated follow up appointments were made and/or referrals ordered.    Positive Risk Factor Screenings with Interventions:                     ADL's:   Patient reports needing help with:  Select all that apply: (!) Transportation  Interventions:  Patient declined any further interventions or treatment      Lung Cancer Screening:  Declines further testing.                   Objective    Patient-Reported Vitals  No data recorded               Allergies   Allergen Reactions    Ibuprofen Nausea Only    Metformin And Related      Diarrhea      Darvon [Propoxyphene Hcl] Nausea And Vomiting     Prior to Visit Medications    Medication Sig Taking? Authorizing Provider   amLODIPine (NORVASC) 5 MG tablet TAKE 1 TABLET BY MOUTH ONCE DAILY. PATIENT NEEDS AN APPOINTMENT FOR MORE REFILLS Yes Scot Blair MD   digoxin (LANOXIN) 125 MCG tablet TAKE 1 TABLET BY MOUTH EVERY OTHER DAY. PATIENT MUST MAKE APPOINTMENT FOR MORE REFILLS Yes Scot Blair MD   losartan (COZAAR) 50 MG tablet Take 1 tablet by mouth daily Per Dr Gonzales 10/23/24 Yes ProviderHeath MD   aspirin 81 MG EC tablet Take 1 tablet by mouth daily Yes Scot Blair MD   rosuvastatin (CRESTOR) 10 MG tablet Take 1 tablet by mouth once daily Yes Ryan Guerrero MD   insulin lispro (HUMALOG,ADMELOG) 100 UNIT/ML SOLN injection vial INJECT SUBCUTANEOUSLY THREE

## 2024-12-09 DIAGNOSIS — Z79.4 TYPE 2 DIABETES MELLITUS WITH OTHER SPECIFIED COMPLICATION, WITH LONG-TERM CURRENT USE OF INSULIN (HCC): Primary | ICD-10-CM

## 2024-12-09 DIAGNOSIS — E11.69 TYPE 2 DIABETES MELLITUS WITH OTHER SPECIFIED COMPLICATION, WITH LONG-TERM CURRENT USE OF INSULIN (HCC): Primary | ICD-10-CM

## 2024-12-09 RX ORDER — GLUCOSAMINE HCL/CHONDROITIN SU 500-400 MG
CAPSULE ORAL
Qty: 100 STRIP | Refills: 3 | Status: SHIPPED | OUTPATIENT
Start: 2024-12-09

## 2024-12-09 RX ORDER — LANCETS 30 GAUGE
1 EACH MISCELLANEOUS DAILY
Qty: 100 EACH | Refills: 3 | Status: SHIPPED | OUTPATIENT
Start: 2024-12-09

## 2024-12-09 NOTE — TELEPHONE ENCOUNTER
Patients spouse Delano (on HIPAA) calling in for patient.  He said her accucheck meter is not working and they need an order/prescription to get a new one sent to [facility] on [address].  Please advise.

## 2024-12-11 ENCOUNTER — OFFICE VISIT (OUTPATIENT)
Dept: PALLATIVE CARE | Age: 79
End: 2024-12-11
Payer: MEDICARE

## 2024-12-11 VITALS — OXYGEN SATURATION: 97 % | SYSTOLIC BLOOD PRESSURE: 155 MMHG | HEART RATE: 67 BPM | DIASTOLIC BLOOD PRESSURE: 60 MMHG

## 2024-12-11 DIAGNOSIS — I50.42 CHRONIC COMBINED SYSTOLIC AND DIASTOLIC CHF (CONGESTIVE HEART FAILURE) (HCC): Primary | ICD-10-CM

## 2024-12-11 DIAGNOSIS — F33.2 MODERATELY SEVERE RECURRENT MAJOR DEPRESSION (HCC): ICD-10-CM

## 2024-12-11 DIAGNOSIS — Z51.5 PALLIATIVE CARE ENCOUNTER: ICD-10-CM

## 2024-12-11 DIAGNOSIS — R63.4 UNINTENTIONAL WEIGHT LOSS: ICD-10-CM

## 2024-12-11 DIAGNOSIS — Z87.19 HISTORY OF GI BLEED: ICD-10-CM

## 2024-12-11 DIAGNOSIS — F48.2 PSEUDOBULBAR AFFECT: ICD-10-CM

## 2024-12-11 DIAGNOSIS — R45.86 EMOTIONAL LABILITY: ICD-10-CM

## 2024-12-11 DIAGNOSIS — R19.7 DIARRHEA, UNSPECIFIED TYPE: ICD-10-CM

## 2024-12-11 DIAGNOSIS — Q27.30 AVM (ARTERIOVENOUS MALFORMATION): ICD-10-CM

## 2024-12-11 DIAGNOSIS — J44.9 CHRONIC OBSTRUCTIVE PULMONARY DISEASE, UNSPECIFIED COPD TYPE (HCC): ICD-10-CM

## 2024-12-11 PROCEDURE — 99349 HOME/RES VST EST MOD MDM 40: CPT | Performed by: NURSE PRACTITIONER

## 2024-12-11 PROCEDURE — 3077F SYST BP >= 140 MM HG: CPT | Performed by: NURSE PRACTITIONER

## 2024-12-11 PROCEDURE — 3078F DIAST BP <80 MM HG: CPT | Performed by: NURSE PRACTITIONER

## 2024-12-11 PROCEDURE — 1123F ACP DISCUSS/DSCN MKR DOCD: CPT | Performed by: NURSE PRACTITIONER

## 2024-12-11 ASSESSMENT — ENCOUNTER SYMPTOMS
CONSTIPATION: 0
ABDOMINAL PAIN: 0
TROUBLE SWALLOWING: 0
BLOOD IN STOOL: 0
BACK PAIN: 0
RESPIRATORY NEGATIVE: 1

## 2024-12-11 NOTE — PROGRESS NOTES
Subjective:      Patient Id: Seen Aimee at home in [place] for follow-up palliative medicine visit. She was accompanied to the appointment by: spouse, Delano, and son.    Chief Complaint   Patient presents with    Follow-up     Doing well.        HPI       Aimee Villanueva is a 79 y.o. female with a complex medical history that includes GI bleeding throughout the distal small intestine, AVM malformation, anxiety, asthma, CHF, COPD, CKD depression, fibromyalgia, OA, cardiac arrest, seizures, DM II, CVA, sleep apnea, and pseudobulbar affect.    General: Patient is alert, oriented x 4, in NAD. Sitting on couch. Well-appearing. No slurred speech.     CHF/COPD: Stable. No leg swelling. Following with Dr. Blair from cardiology. No trouble breathing/chest pain.    GI: GERD stable. No issues with bleeding. Cardiology restarting patient on baby aspirin daily. However, patient decided to use every other day. No issues with diarrhea. Weight stable.      DM: Improved control per spouse. Down in 100's low 200's. Was previously in 300's. Follows with endocrine.     Mood: Mood has been stable.      : Ongoing issues with urgency- this has improved some.      Past Medical History:   Diagnosis Date    Acute combined systolic and diastolic CHF, NYHA class 1 (HCC) 03/24/2019    Adenomatous polyp of sigmoid colon     Adenomatous polyp of transverse colon     Anxiety     Asthma     dx 2019 / has smoked since age 15    Brainstem stroke (HCC)     Cardiopulmonary arrest     CHF (congestive heart failure) ([facility])     Chronic back pain     Bilateral L5 S1 Radic on emg--surprisingly worse on the left than the right--pt's symptoms and her MRI show worse on the right    Chronic obstructive pulmonary disease with acute exacerbation (HCC) 10/12/2019    Depression     ESBL E. coli carrier     Carrier.    Fibromyalgia     Gastrointestinal hemorrhage 02/24/2020    Hyperlipidemia     meds > 8 yrs    Hypertension     meds > 45 yrs    Infection due to

## 2024-12-12 ASSESSMENT — ENCOUNTER SYMPTOMS: DIARRHEA: 0

## 2024-12-19 ENCOUNTER — HOSPITAL ENCOUNTER (OUTPATIENT)
Dept: INFUSION THERAPY | Age: 79
Setting detail: INFUSION SERIES
End: 2024-12-19

## 2024-12-28 DIAGNOSIS — Z87.19 HISTORY OF GI BLEED: ICD-10-CM

## 2024-12-28 DIAGNOSIS — E78.00 PURE HYPERCHOLESTEROLEMIA: ICD-10-CM

## 2024-12-28 DIAGNOSIS — I50.41 ACUTE COMBINED SYSTOLIC AND DIASTOLIC CHF, NYHA CLASS 1 (HCC): ICD-10-CM

## 2024-12-28 RX ORDER — PANTOPRAZOLE SODIUM 40 MG/1
TABLET, DELAYED RELEASE ORAL
Qty: 90 TABLET | Refills: 0 | Status: SHIPPED | OUTPATIENT
Start: 2024-12-28

## 2024-12-29 RX ORDER — DIGOXIN 125 MCG
125 TABLET ORAL EVERY OTHER DAY
Qty: 45 TABLET | Refills: 3 | Status: SHIPPED | OUTPATIENT
Start: 2024-12-29

## 2024-12-29 RX ORDER — CARVEDILOL 25 MG/1
TABLET ORAL
Qty: 270 TABLET | Refills: 3 | Status: SHIPPED | OUTPATIENT
Start: 2024-12-29

## 2024-12-29 RX ORDER — AMLODIPINE BESYLATE 5 MG/1
5 TABLET ORAL DAILY
Qty: 90 TABLET | Refills: 3 | Status: SHIPPED | OUTPATIENT
Start: 2024-12-29

## 2024-12-29 NOTE — TELEPHONE ENCOUNTER
Future Appointments    Encounter Information   Provider Department Appt Notes   1/2/2025 [facility] INFUSION CHAIR 5 [facility] OP INFUSION epic/pt  Delano/ port flush//  r/s/ pt  r/s   1/29/2025 Melanie Dumont APRN - CNP [facility] Palliative [facility] F/U [place]   4/24/2025 Scot Blair MD [facility] Cardiology 6 month   9/8/2025 Mehul Bingham MD [facility] Neurology 1 YR FUP     Past Visits    Date Provider Specialty Visit Type Primary Dx   12/11/2024 Melanie Dumont APRN - CNP Palliative Care Office Visit Chronic combined systolic and diastolic CHF (congestive heart failure) ([facility])   12/02/2024 Ryan Guerrero MD Family Medicine Telemedicine Medicare annual wellness visit, subsequent   10/30/2024 Melanie Dumont APRN - CNP Palliative Care Office Visit Chronic combined systolic and diastolic CHF (congestive heart failure) ([facility])   10/24/2024 Scot Blair MD Cardiology Office Visit Chronic combined systolic and diastolic CHF (congestive heart failure) ([facility])   09/09/2024 Valentin Rojo PA Endocrinology Office Visit Type 2 diabetes mellitus with other specified complication, with long-term current use of insulin ([facility])

## 2024-12-30 RX ORDER — ROSUVASTATIN CALCIUM 10 MG/1
10 TABLET, COATED ORAL DAILY
Qty: 90 TABLET | Refills: 3 | Status: SHIPPED | OUTPATIENT
Start: 2024-12-30

## 2025-01-02 ENCOUNTER — HOSPITAL ENCOUNTER (OUTPATIENT)
Dept: INFUSION THERAPY | Age: 80
Setting detail: INFUSION SERIES
End: 2025-01-02

## 2025-01-09 ENCOUNTER — HOSPITAL ENCOUNTER (OUTPATIENT)
Dept: INFUSION THERAPY | Age: 80
Setting detail: INFUSION SERIES
Discharge: HOME | End: 2025-01-09

## 2025-01-09 ENCOUNTER — TELEPHONE (OUTPATIENT)
Dept: PALLATIVE CARE | Age: 80
End: 2025-01-09

## 2025-01-09 VITALS
SYSTOLIC BLOOD PRESSURE: 146 MMHG | TEMPERATURE: 98.3 F | OXYGEN SATURATION: 99 % | HEART RATE: 75 BPM | DIASTOLIC BLOOD PRESSURE: 66 MMHG | RESPIRATION RATE: 18 BRPM

## 2025-01-09 DIAGNOSIS — Z95.828 PORT-A-CATH IN PLACE: Primary | ICD-10-CM

## 2025-01-09 RX ORDER — HEPARIN 100 UNIT/ML
500 SYRINGE INTRAVENOUS PRN
OUTPATIENT
Start: 2025-02-01

## 2025-01-09 RX ORDER — SODIUM CHLORIDE 0.9 % (FLUSH) 0.9 %
5-40 SYRINGE (ML) INJECTION PRN
OUTPATIENT
Start: 2025-02-01

## 2025-01-09 RX ORDER — HEPARIN 100 UNIT/ML
500 SYRINGE INTRAVENOUS PRN
Status: DISCONTINUED | OUTPATIENT
Start: 2025-01-09 | End: 2025-01-10 | Stop reason: HOSPADM

## 2025-01-09 RX ORDER — SODIUM CHLORIDE 0.9 % (FLUSH) 0.9 %
5-40 SYRINGE (ML) INJECTION PRN
Status: DISCONTINUED | OUTPATIENT
Start: 2025-01-09 | End: 2025-01-10 | Stop reason: HOSPADM

## 2025-01-09 NOTE — TELEPHONE ENCOUNTER
Mita from outpatient infusion messaged regarding patient request to have port removed. Referral placed to Dr. Art.

## 2025-01-09 NOTE — FLOWSHEET NOTE
Patient to the floor via wheelchair for her port . Vital signs taken. Patient and  both have expressed that they want this port removed due to cost and difficulty of getting patient here monthly. She did not want to be accessed at this time. Encouraged her to call PCP or Yesenia Dumont to inquire about removal and need for a referral to Dr. Ford for removal.  states that if they do decide to not remove this, he will call and reschedule.  They left the unit ambulatory. All equipment used in the care for this patient has been cleaned.

## 2025-01-10 ENCOUNTER — TELEPHONE (OUTPATIENT)
Dept: INTERVENTIONAL RADIOLOGY/VASCULAR | Age: 80
End: 2025-01-10

## 2025-01-29 ENCOUNTER — OFFICE VISIT (OUTPATIENT)
Dept: PALLATIVE CARE | Age: 80
End: 2025-01-29
Payer: MEDICARE

## 2025-01-29 VITALS — SYSTOLIC BLOOD PRESSURE: 168 MMHG | OXYGEN SATURATION: 99 % | HEART RATE: 82 BPM | DIASTOLIC BLOOD PRESSURE: 70 MMHG

## 2025-01-29 DIAGNOSIS — J44.9 CHRONIC OBSTRUCTIVE PULMONARY DISEASE, UNSPECIFIED COPD TYPE (HCC): ICD-10-CM

## 2025-01-29 DIAGNOSIS — E11.42 DIABETIC PERIPHERAL NEUROPATHY (HCC): ICD-10-CM

## 2025-01-29 DIAGNOSIS — Z95.828 PORT-A-CATH IN PLACE: ICD-10-CM

## 2025-01-29 DIAGNOSIS — Z51.5 PALLIATIVE CARE ENCOUNTER: ICD-10-CM

## 2025-01-29 DIAGNOSIS — F33.1 MODERATE RECURRENT MAJOR DEPRESSION (HCC): ICD-10-CM

## 2025-01-29 DIAGNOSIS — Q27.30 AVM (ARTERIOVENOUS MALFORMATION): ICD-10-CM

## 2025-01-29 DIAGNOSIS — Z87.19 HISTORY OF GI BLEED: ICD-10-CM

## 2025-01-29 DIAGNOSIS — F48.2 PSEUDOBULBAR AFFECT: ICD-10-CM

## 2025-01-29 DIAGNOSIS — I50.42 CHRONIC COMBINED SYSTOLIC AND DIASTOLIC CHF (CONGESTIVE HEART FAILURE) (HCC): Primary | ICD-10-CM

## 2025-01-29 DIAGNOSIS — R45.86 EMOTIONAL LABILITY: ICD-10-CM

## 2025-01-29 PROCEDURE — 1123F ACP DISCUSS/DSCN MKR DOCD: CPT | Performed by: NURSE PRACTITIONER

## 2025-01-29 PROCEDURE — 3077F SYST BP >= 140 MM HG: CPT | Performed by: NURSE PRACTITIONER

## 2025-01-29 PROCEDURE — 1160F RVW MEDS BY RX/DR IN RCRD: CPT | Performed by: NURSE PRACTITIONER

## 2025-01-29 PROCEDURE — 99349 HOME/RES VST EST MOD MDM 40: CPT | Performed by: NURSE PRACTITIONER

## 2025-01-29 PROCEDURE — 3078F DIAST BP <80 MM HG: CPT | Performed by: NURSE PRACTITIONER

## 2025-01-29 PROCEDURE — 1159F MED LIST DOCD IN RCRD: CPT | Performed by: NURSE PRACTITIONER

## 2025-01-29 ASSESSMENT — ENCOUNTER SYMPTOMS
RESPIRATORY NEGATIVE: 1
DIARRHEA: 0
CONSTIPATION: 0
BACK PAIN: 0
TROUBLE SWALLOWING: 0
ABDOMINAL PAIN: 0
BLOOD IN STOOL: 0

## 2025-01-29 ASSESSMENT — PATIENT HEALTH QUESTIONNAIRE - PHQ9
9. THOUGHTS THAT YOU WOULD BE BETTER OFF DEAD, OR OF HURTING YOURSELF: NOT AT ALL
4. FEELING TIRED OR HAVING LITTLE ENERGY: NEARLY EVERY DAY
3. TROUBLE FALLING OR STAYING ASLEEP: NOT AT ALL
5. POOR APPETITE OR OVEREATING: SEVERAL DAYS
8. MOVING OR SPEAKING SO SLOWLY THAT OTHER PEOPLE COULD HAVE NOTICED. OR THE OPPOSITE, BEING SO FIGETY OR RESTLESS THAT YOU HAVE BEEN MOVING AROUND A LOT MORE THAN USUAL: NOT AT ALL
SUM OF ALL RESPONSES TO PHQ QUESTIONS 1-9: 10
SUM OF ALL RESPONSES TO PHQ9 QUESTIONS 1 & 2: 4
7. TROUBLE CONCENTRATING ON THINGS, SUCH AS READING THE NEWSPAPER OR WATCHING TELEVISION: SEVERAL DAYS
10. IF YOU CHECKED OFF ANY PROBLEMS, HOW DIFFICULT HAVE THESE PROBLEMS MADE IT FOR YOU TO DO YOUR WORK, TAKE CARE OF THINGS AT HOME, OR GET ALONG WITH OTHER PEOPLE: SOMEWHAT DIFFICULT
6. FEELING BAD ABOUT YOURSELF - OR THAT YOU ARE A FAILURE OR HAVE LET YOURSELF OR YOUR FAMILY DOWN: SEVERAL DAYS
SUM OF ALL RESPONSES TO PHQ QUESTIONS 1-9: 10
1. LITTLE INTEREST OR PLEASURE IN DOING THINGS: NEARLY EVERY DAY
2. FEELING DOWN, DEPRESSED OR HOPELESS: SEVERAL DAYS
SUM OF ALL RESPONSES TO PHQ QUESTIONS 1-9: 10
SUM OF ALL RESPONSES TO PHQ QUESTIONS 1-9: 10

## 2025-01-29 NOTE — PROGRESS NOTES
Subjective:      Patient Id: Seen Aimee at home in [place] for follow-up palliative medicine visit. She was accompanied to the appointment by: spouse, Delano, and son.    Chief Complaint   Patient presents with    Follow-up        HPI       Aimee Villanueva is a 79 y.o. female with a complex medical history that includes GI bleeding throughout the distal small intestine, AVM malformation, anxiety, asthma, CHF, COPD, CKD depression, fibromyalgia, OA, cardiac arrest, seizures, DM II, CVA, sleep apnea, and pseudobulbar affect.    General: Patient is alert, oriented x 4, in NAD. Sitting on couch. Well-appearing.     CHF/COPD: Stable. No leg swelling. Following with Dr. Blair from cardiology. No trouble breathing/chest pain.    GI: GERD stable. No issues with GI bleeding.     Mood: Mood has been stable. Patient feels mood is better than in the past. Occasional crying episodes.     Nose bleeds: Patient reports she has been getting nose bleeds. These do not occur often. Last for about 10 minutes.     Infusaport: Has not gotten port removed yet. Plans to have this done. Previously placed referral for removal.     Pain: Having recurrence of mild pain in right hip/lateral leg. Has upcoming appointment with pain management for possible interventional procedure. Previous procedure lasted several months. Patient also reports her hands have been bothering her significantly due to neuropathy.       Past Medical History:   Diagnosis Date    Acute combined systolic and diastolic CHF, NYHA class 1 ([facility]) 03/24/2019    Adenomatous polyp of sigmoid colon     Adenomatous polyp of transverse colon     Anxiety     Asthma     dx 2019 / has smoked since age 15    Brainstem stroke ([facility])     Cardiopulmonary arrest     CHF (congestive heart failure) ([facility])     Chronic back pain     Bilateral L5 S1 Radic on emg--surprisingly worse on the left than the right--pt's symptoms and her MRI show worse on the right    Chronic obstructive pulmonary

## 2025-02-11 ENCOUNTER — OFFICE VISIT (OUTPATIENT)
Age: 80
End: 2025-02-11
Payer: MEDICARE

## 2025-02-11 VITALS
TEMPERATURE: 97.7 F | HEIGHT: 59 IN | SYSTOLIC BLOOD PRESSURE: 150 MMHG | BODY MASS INDEX: 28.22 KG/M2 | WEIGHT: 140 LBS | DIASTOLIC BLOOD PRESSURE: 60 MMHG

## 2025-02-11 DIAGNOSIS — M51.26 LUMBAR HERNIATED DISC: Primary | ICD-10-CM

## 2025-02-11 DIAGNOSIS — M54.16 LUMBAR RADICULOPATHY: ICD-10-CM

## 2025-02-11 PROCEDURE — 1159F MED LIST DOCD IN RCRD: CPT | Performed by: PAIN MEDICINE

## 2025-02-11 PROCEDURE — 99214 OFFICE O/P EST MOD 30 MIN: CPT | Performed by: PAIN MEDICINE

## 2025-02-11 PROCEDURE — 1125F AMNT PAIN NOTED PAIN PRSNT: CPT | Performed by: PAIN MEDICINE

## 2025-02-11 PROCEDURE — 3078F DIAST BP <80 MM HG: CPT | Performed by: PAIN MEDICINE

## 2025-02-11 PROCEDURE — 3077F SYST BP >= 140 MM HG: CPT | Performed by: PAIN MEDICINE

## 2025-02-11 PROCEDURE — 99215 OFFICE O/P EST HI 40 MIN: CPT | Performed by: PAIN MEDICINE

## 2025-02-11 PROCEDURE — 1123F ACP DISCUSS/DSCN MKR DOCD: CPT | Performed by: PAIN MEDICINE

## 2025-02-11 ASSESSMENT — ENCOUNTER SYMPTOMS
DIARRHEA: 0
CONSTIPATION: 1
SHORTNESS OF BREATH: 0
NAUSEA: 0
BACK PAIN: 1

## 2025-02-11 NOTE — PROGRESS NOTES
DIANE Agarwal   Respiratory Therapy Supplies (NEBULIZER/TUBING/MOUTHPIECE) KIT 1 kit by Does not apply route as needed (SOB/wheezing) Yes Melanie Dumont APRN - CNP   glucose 4 g chewable tablet Take 4 tablets by mouth as needed for Low blood sugar Yes Valentin Rojo PA   blood glucose monitor kit and supplies Give 1 meter covered by insurance Yes Valentin Rojo PA   Handicap Placard MISC by Does not apply route Good from 10/6/22 till 10/6/27 Yes Ryan Guerrero MD   Glucagon (GVOKE HYPOPEN 2-PACK) 1 MG/0.2ML SOAJ Inject 1 mg into the skin every 15 minutes as needed (glucose less tahtn 70 or if symptomatic) Yes Valentin Rojo PA   albuterol-ipratropium (COMBIVENT RESPIMAT)  MCG/ACT AERS inhaler Inhale 1 puff into the lungs every 6 hours as needed for Wheezing or Shortness of Breath Yes Ryan Guerrero MD   ipratropium-albuterol (DUONEB) 0.5-2.5 (3) MG/3ML SOLN nebulizer solution Inhale 3 mLs into the lungs 3 times daily  Patient taking differently: Inhale 3 mLs into the lungs 3 times daily as needed Yes Ryan Guerrero MD   Cholecalciferol (VITAMIN D3) 50 MCG (2000 UT) CAPS Take 1,000 Units by mouth 2 times daily  Yes Heath Martines MD   Blood Glucose Monitoring Suppl (ACCU-CHEK PHYLLIS CONNECT) w/Device KIT 1 kit by Does not apply route daily Yes Lyle Scott MD       Social History     Tobacco Use    Smoking status: Former     Current packs/day: 0.00     Average packs/day: 1 pack/day for 59.0 years (59.0 ttl pk-yrs)     Types: Cigarettes     Start date: 2017     Quit date: 10/1/2020     Years since quittin.3    Smokeless tobacco: Never   Vaping Use    Vaping status: Never Used   Substance Use Topics    Alcohol use: Not Currently    Drug use: No            PHYSICAL EXAMINATION:  [ INSTRUCTIONS:  \"[x]\" Indicates a positive item  \"[]\" Indicates a negative item  -- DELETE ALL ITEMS NOT EXAMINED]  Vital Signs: (As obtained by patient/caregiver or practitioner

## 2025-02-11 NOTE — PROGRESS NOTES
[facility]  [facility]  [address], [address]  [place], [place]  P: [phone]  F: [phone]        Aimee Villanueva  (1945)    2/11/2025    Subjective:     Aimee Villanueva is 79 y.o. female who complains today of:    Chief Complaint   Patient presents with    Back Pain     Lower back pain, last visit was six months ago..       HPI    Pain is located in her right lower back that radiates into the right hip and lateral. She had this pain before, but they resolved after her TFESI. She had great relief for several months (Injection was in June 2024).    She is also s/p RFA.     Pain is  acute on chronic . Pain returned in the right lower back and leg. We discussed repeating successful TFESI.   Pain is described as throbbing, aching, sharp, and shooting.  Pain level today is rated 8 on a 10 point scale.  It is severe in nature.  With pain medication, pain level drops to a 7/10.  Without pain medication, pain level can escalate upto a 10/10.  Pain is affecting the patients ability to perform activities of daily living especially with regards to personal hygiene, household chores and self care.  With pain medication, the patient is better able to perform ADLs.  Pain in part is secondary to osteoarthritis of the spine.  The patient is tolerating her pain medication (nothing has been working lately. She has had adverse reactions in the past, so she stopped tylenol and NSAIDs) regimen without any adverse effects.  Pain is aggravated by bending, lifting, twisting, walking, and standing  Pain is not associated with fevers/chills/night sweats.  Bowel and bladder are working appropriately.  No new issues with balance, requires a cane for balance and ambulation.   No new paraesthesias noted.  On stool softener for constipation. Working well.       Allergies:  Ibuprofen, Metformin and related, and Darvon [propoxyphene hcl]    Past Medical

## 2025-02-12 ENCOUNTER — TELEPHONE (OUTPATIENT)
Age: 80
End: 2025-02-12

## 2025-02-12 ENCOUNTER — TELEMEDICINE (OUTPATIENT)
Dept: INTERVENTIONAL RADIOLOGY/VASCULAR | Age: 80
End: 2025-02-12
Payer: MEDICARE

## 2025-02-12 DIAGNOSIS — Z86.39 HISTORY OF HYPERLIPIDEMIA, MIXED: ICD-10-CM

## 2025-02-12 DIAGNOSIS — Z95.828 PORT-A-CATH IN PLACE: Primary | ICD-10-CM

## 2025-02-12 DIAGNOSIS — I10 PRIMARY HYPERTENSION: ICD-10-CM

## 2025-02-12 PROCEDURE — 1123F ACP DISCUSS/DSCN MKR DOCD: CPT

## 2025-02-12 PROCEDURE — 99204 OFFICE O/P NEW MOD 45 MIN: CPT

## 2025-02-12 NOTE — TELEPHONE ENCOUNTER
Called patient and spoke to spouse, and was able to schedule RIGHT L4-5 TFESI on 02/26/2025 @ 1:30 PM     NO AUTH REQUIRED   Per patient, wanted to reschedule to a Tuesday. Now moved to [facility] with DR Mcgarry on 10/11/2022 at 9:45 a.m.   Updated instructions sent to patient.

## 2025-02-14 ENCOUNTER — TELEPHONE (OUTPATIENT)
Dept: PALLATIVE CARE | Age: 80
End: 2025-02-14

## 2025-02-14 DIAGNOSIS — R30.0 DYSURIA: ICD-10-CM

## 2025-02-14 RX ORDER — SULFAMETHOXAZOLE AND TRIMETHOPRIM 400; 80 MG/1; MG/1
1 TABLET ORAL 2 TIMES DAILY
Qty: 10 TABLET | Refills: 0 | Status: SHIPPED | OUTPATIENT
Start: 2025-02-15 | End: 2025-02-20

## 2025-02-14 RX ORDER — SULFAMETHOXAZOLE AND TRIMETHOPRIM 800; 160 MG/1; MG/1
1 TABLET ORAL ONCE
Qty: 1 TABLET | Refills: 0 | Status: SHIPPED | OUTPATIENT
Start: 2025-02-14 | End: 2025-02-14

## 2025-02-14 NOTE — TELEPHONE ENCOUNTER
Creat. Clearance 28 mL/min    Start Bactrim DS x 1 dose today. Then Bactrim SS BID x 5 days starting tomorrow.

## 2025-02-14 NOTE — TELEPHONE ENCOUNTER
Patient's family member called in stating they have given patient 2 tests for UTI and both came back positive. Asking for medication.     Patient states odor, slight burning during urination, pressure, frequent urination with little volume and tired and lethargic  Please advise..

## 2025-02-14 NOTE — TELEPHONE ENCOUNTER
Patient's family member called in stating they have given patient 2 tests for UTI and both came back positive. Asking for medication.  Please advise.

## 2025-02-18 ENCOUNTER — TELEPHONE (OUTPATIENT)
Dept: INTERVENTIONAL RADIOLOGY/VASCULAR | Age: 80
End: 2025-02-18

## 2025-02-18 NOTE — TELEPHONE ENCOUNTER
Patient was given this information via phone conversation - voiced understanding  2.  Spoke to Joie in Specials to schedule procedure    >  Right side port removal is scheduled on 2/28/2025 @ 9:00 am   >  You will need to arrive at 7:30 am from home and check in at the Diagnostic Imaging Check In desk.   >  Do not eat or drink after midnight.    >  Patient will make arrangements for transportation by , [place]  >  Patient to hold Aspirin for 5 days prior to procedure  - starting 2/23/2025 and Insulin the morning of procedure   >  Patient to take Coreg (carvedilol) with a small sip of water the morning of the procedure  >  Patient to have PT/INR and CBC drawn anytime between now and 1 day prior to procedure  >  Patient is aware to bring a Dexcom sensor because the one she will be wearing will need to be removed

## 2025-02-25 ENCOUNTER — TELEPHONE (OUTPATIENT)
Dept: INTERVENTIONAL RADIOLOGY/VASCULAR | Age: 80
End: 2025-02-25

## 2025-02-25 NOTE — TELEPHONE ENCOUNTER
Call placed to patient to remind her to have labs drawn before procedure scheduled on 2/28/25. No answer, left message on voicemail.

## 2025-02-26 ENCOUNTER — PROCEDURE VISIT (OUTPATIENT)
Age: 80
End: 2025-02-26
Payer: MEDICARE

## 2025-02-26 VITALS
DIASTOLIC BLOOD PRESSURE: 60 MMHG | HEART RATE: 74 BPM | OXYGEN SATURATION: 98 % | SYSTOLIC BLOOD PRESSURE: 138 MMHG | TEMPERATURE: 97.6 F | WEIGHT: 140 LBS | HEIGHT: 59 IN | BODY MASS INDEX: 28.22 KG/M2

## 2025-02-26 DIAGNOSIS — M54.16 LUMBAR RADICULOPATHY: ICD-10-CM

## 2025-02-26 DIAGNOSIS — M51.26 LUMBAR HERNIATED DISC: ICD-10-CM

## 2025-02-26 DIAGNOSIS — Z95.828 PORT-A-CATH IN PLACE: ICD-10-CM

## 2025-02-26 DIAGNOSIS — Z86.39 HISTORY OF HYPERLIPIDEMIA, MIXED: ICD-10-CM

## 2025-02-26 DIAGNOSIS — I10 PRIMARY HYPERTENSION: ICD-10-CM

## 2025-02-26 LAB
ERYTHROCYTE [DISTWIDTH] IN BLOOD BY AUTOMATED COUNT: 13.8 % (ref 11.5–14.5)
HCT VFR BLD AUTO: 39.9 % (ref 37–47)
HGB BLD-MCNC: 12.6 G/DL (ref 12–16)
INR PPP: 1.1
MCH RBC QN AUTO: 28 PG (ref 27–31.3)
MCHC RBC AUTO-ENTMCNC: 31.6 % (ref 33–37)
MCV RBC AUTO: 88.7 FL (ref 79.4–94.8)
PLATELET # BLD AUTO: 342 K/UL (ref 130–400)
PROTHROMBIN TIME: 14.5 SEC (ref 12.3–14.9)
RBC # BLD AUTO: 4.5 M/UL (ref 4.2–5.4)
WBC # BLD AUTO: 4.9 K/UL (ref 4.8–10.8)

## 2025-02-26 PROCEDURE — 64483 NJX AA&/STRD TFRM EPI L/S 1: CPT | Performed by: PAIN MEDICINE

## 2025-02-26 RX ORDER — DEXAMETHASONE SODIUM PHOSPHATE 10 MG/ML
5 INJECTION, SOLUTION INTRAMUSCULAR; INTRAVENOUS ONCE
Status: COMPLETED | OUTPATIENT
Start: 2025-02-26 | End: 2025-02-26

## 2025-02-26 RX ORDER — SODIUM CHLORIDE 9 MG/ML
1 INJECTION, SOLUTION INTRAMUSCULAR; INTRAVENOUS; SUBCUTANEOUS ONCE
Status: COMPLETED | OUTPATIENT
Start: 2025-02-26 | End: 2025-02-26

## 2025-02-26 RX ORDER — LIDOCAINE HYDROCHLORIDE 10 MG/ML
1 INJECTION, SOLUTION EPIDURAL; INFILTRATION; INTRACAUDAL; PERINEURAL ONCE
Status: COMPLETED | OUTPATIENT
Start: 2025-02-26 | End: 2025-02-26

## 2025-02-26 RX ADMIN — DEXAMETHASONE SODIUM PHOSPHATE 5 MG: 10 INJECTION, SOLUTION INTRAMUSCULAR; INTRAVENOUS at 14:08

## 2025-02-26 RX ADMIN — SODIUM CHLORIDE 1 ML: 9 INJECTION, SOLUTION INTRAMUSCULAR; INTRAVENOUS; SUBCUTANEOUS at 14:09

## 2025-02-26 RX ADMIN — LIDOCAINE HYDROCHLORIDE 1 ML: 10 INJECTION, SOLUTION EPIDURAL; INFILTRATION; INTRACAUDAL; PERINEURAL at 14:08

## 2025-02-26 ASSESSMENT — PAIN SCALES - GENERAL
PAINLEVEL_OUTOF10: 5
PAINLEVEL_OUTOF10: 3

## 2025-02-26 ASSESSMENT — PAIN DESCRIPTION - LOCATION
LOCATION: BACK
LOCATION: BACK

## 2025-02-26 NOTE — PROGRESS NOTES
LUMBAR TRANSFORAMINAL INJECTION      Patient Name: Aimee Villanueva : 1945  Date: 2025   Physician: DYAN ONTIVEROS MD      Aimee Villanueva  is here today for interventional pain management.    Preoperatively, the patient presents with radicular pain in the affected area as per history and exam. Patient has failed NSAIDs, PT/HEP, and conservative treatment. Patient has significant psychological and functional impairment due to this condition.  Standard ASIPP guidelines were followed and sterile technique used.  Area was cleaned with Betadine x3.  Informed consent was obtained.  Fluoroscopic guidance was used for this procedure.     Discussed the risks including but not limited to bleeding, infection, worsened pain, damage to surrounding structures, side effects, toxicity, allergic reactions to medications used, need for surgery, pneumothorax, abdominal and visceral injury, as well as catastrophic injury such as vision loss, paralysis, spinal cord or plexus injury, stroke, bowel or bladder incontinence, chest tube insertion, ventilator dependence, and death. Discussed the risks, benefits, and alternatives to the procedure including no procedure at all. Discussed that we cannot undo any permanent neurologic or orthopaedic damage or change the course of any underlying disease. After thorough discussion, patient expressed understanding and willingness to proceed. Written consent was obtained and is in the chart. Verbal consent to proceed was obtained.    TRANSFORAMINAL EPIDURAL  There was limited spread of contrast in the anterior epidural space and around the exiting nerve root. The 6 o’clock position of the pedicle was identified. Multiple views of fluoroscopy including lateral were used to confirm accurate needle placement of depth. Live fluoroscopy was used when injecting contrast to ensure no subdural or vascular spread. In total, approximately 5 mg of Dexamethasone and 0.5

## 2025-02-27 ENCOUNTER — PRE-PROCEDURE TELEPHONE (OUTPATIENT)
Dept: INTERVENTIONAL RADIOLOGY/VASCULAR | Age: 80
End: 2025-02-27

## 2025-02-27 NOTE — FLOWSHEET NOTE
Apt reminder called to patient.   Pts  answered and the following reviewed:   IR apt scheduled on 2/28/2025 @ 9:00 am   >  Pt will need to arrive at 7:30 am from home and check in at the Diagnostic Imaging Check In desk.   >  Do not eat or drink after midnight.    >  Patient will make arrangements for transportation by [place] rangel  >  Patient to hold Aspirin for 5 days prior to procedure  - starting 2/23/2025 and Insulin the morning of procedure   >  Patient to take Coreg (carvedilol) with a small sip of water the morning of the procedure.   The above info confirmed by [facility] rangel. Electronically signed by Alice Mercado RN on 2/27/2025 at 9:26 AM

## 2025-02-28 ENCOUNTER — HOSPITAL ENCOUNTER (OUTPATIENT)
Dept: INTERVENTIONAL RADIOLOGY/VASCULAR | Age: 80
End: 2025-02-28
Payer: MEDICARE

## 2025-02-28 VITALS
RESPIRATION RATE: 16 BRPM | TEMPERATURE: 98.2 F | DIASTOLIC BLOOD PRESSURE: 78 MMHG | SYSTOLIC BLOOD PRESSURE: 166 MMHG | OXYGEN SATURATION: 98 % | HEART RATE: 62 BPM

## 2025-02-28 DIAGNOSIS — Z95.828 PORT-A-CATH IN PLACE: ICD-10-CM

## 2025-02-28 LAB
GLUCOSE BLD-MCNC: 380 MG/DL (ref 70–99)
PERFORMED ON: ABNORMAL

## 2025-02-28 PROCEDURE — 2709999900 IR REMOVE TUNNELED CVAD W SQ PORT/PUMP INSERT

## 2025-02-28 PROCEDURE — 6360000002 HC RX W HCPCS: Performed by: RADIOLOGY

## 2025-02-28 PROCEDURE — 2500000003 HC RX 250 WO HCPCS: Performed by: NURSE PRACTITIONER

## 2025-02-28 PROCEDURE — 2580000003 HC RX 258: Performed by: RADIOLOGY

## 2025-02-28 PROCEDURE — 36590 REMOVAL TUNNELED CV CATH: CPT

## 2025-02-28 PROCEDURE — 2580000003 HC RX 258: Performed by: NURSE PRACTITIONER

## 2025-02-28 PROCEDURE — 7100000010 HC PHASE II RECOVERY - FIRST 15 MIN

## 2025-02-28 PROCEDURE — 6360000002 HC RX W HCPCS: Performed by: NURSE PRACTITIONER

## 2025-02-28 PROCEDURE — 99153 MOD SED SAME PHYS/QHP EA: CPT

## 2025-02-28 PROCEDURE — 99152 MOD SED SAME PHYS/QHP 5/>YRS: CPT | Performed by: RADIOLOGY

## 2025-02-28 PROCEDURE — 7100000011 HC PHASE II RECOVERY - ADDTL 15 MIN

## 2025-02-28 PROCEDURE — 2580000003 HC RX 258

## 2025-02-28 PROCEDURE — 77001 FLUOROGUIDE FOR VEIN DEVICE: CPT

## 2025-02-28 PROCEDURE — 99152 MOD SED SAME PHYS/QHP 5/>YRS: CPT

## 2025-02-28 PROCEDURE — 36590 REMOVAL TUNNELED CV CATH: CPT | Performed by: RADIOLOGY

## 2025-02-28 RX ORDER — MIDAZOLAM HYDROCHLORIDE 2 MG/2ML
INJECTION, SOLUTION INTRAMUSCULAR; INTRAVENOUS PRN
Status: COMPLETED | OUTPATIENT
Start: 2025-02-28 | End: 2025-02-28

## 2025-02-28 RX ORDER — LIDOCAINE HYDROCHLORIDE AND EPINEPHRINE BITARTRATE 20; .01 MG/ML; MG/ML
INJECTION, SOLUTION SUBCUTANEOUS PRN
Status: COMPLETED | OUTPATIENT
Start: 2025-02-28 | End: 2025-02-28

## 2025-02-28 RX ORDER — SODIUM CHLORIDE 9 MG/ML
INJECTION, SOLUTION INTRAVENOUS
Status: COMPLETED
Start: 2025-02-28 | End: 2025-02-28

## 2025-02-28 RX ORDER — ONDANSETRON 2 MG/ML
4 INJECTION INTRAMUSCULAR; INTRAVENOUS ONCE
Status: COMPLETED | OUTPATIENT
Start: 2025-02-28 | End: 2025-02-28

## 2025-02-28 RX ORDER — SODIUM CHLORIDE 9 MG/ML
INJECTION, SOLUTION INTRAVENOUS CONTINUOUS PRN
Status: COMPLETED | OUTPATIENT
Start: 2025-02-28 | End: 2025-02-28

## 2025-02-28 RX ORDER — FENTANYL CITRATE 0.05 MG/ML
INJECTION, SOLUTION INTRAMUSCULAR; INTRAVENOUS PRN
Status: COMPLETED | OUTPATIENT
Start: 2025-02-28 | End: 2025-02-28

## 2025-02-28 RX ADMIN — SODIUM CHLORIDE 50 ML: 9 INJECTION, SOLUTION INTRAVENOUS at 08:45

## 2025-02-28 RX ADMIN — WATER 1000 MG: 1 INJECTION INTRAMUSCULAR; INTRAVENOUS; SUBCUTANEOUS at 08:44

## 2025-02-28 RX ADMIN — SODIUM CHLORIDE 1000 MG: 9 INJECTION, SOLUTION INTRAVENOUS at 09:05

## 2025-02-28 RX ADMIN — LIDOCAINE HYDROCHLORIDE,EPINEPHRINE BITARTRATE 25 ML: 20; .01 INJECTION, SOLUTION INFILTRATION; PERINEURAL at 09:22

## 2025-02-28 RX ADMIN — FENTANYL CITRATE 50 MCG: 50 INJECTION INTRAMUSCULAR; INTRAVENOUS at 09:16

## 2025-02-28 RX ADMIN — MIDAZOLAM HYDROCHLORIDE 0.5 MG: 1 INJECTION, SOLUTION INTRAMUSCULAR; INTRAVENOUS at 09:16

## 2025-02-28 RX ADMIN — ONDANSETRON 4 MG: 2 INJECTION, SOLUTION INTRAMUSCULAR; INTRAVENOUS at 10:01

## 2025-02-28 RX ADMIN — SODIUM CHLORIDE 125 ML/HR: 0.9 INJECTION, SOLUTION INTRAVENOUS at 09:15

## 2025-02-28 ASSESSMENT — PAIN - FUNCTIONAL ASSESSMENT
PAIN_FUNCTIONAL_ASSESSMENT: NONE - DENIES PAIN
PAIN_FUNCTIONAL_ASSESSMENT: NONE - DENIES PAIN

## 2025-02-28 NOTE — PROGRESS NOTES
Pt arrived to CT holding and states feeling nauseous. Glued site to right upper chest clean, dry, and intact. Surrounding area soft. NO bleeding or oozing. Pt's VSS. Pt denies pain.     1010 Pt eating saltine crackers and drinking ginger-julian and tolerating well.     1016 Glued site to right upper chest clean, dry, and intact. Surrounding area soft. NO bleeding or oozing. Pt's VSS. Pt denies pain. Pt denies nausea.     1024 Discharge instructions reviewed with pt and .     1031 Glued site to right upper chest clean, dry, and intact. Surrounding area soft. NO bleeding or oozing. Pt's VSS. Pt denies pain. Pt denies nausea.     1034 IV removed. Pt tolerated well.     1041 Pt changed into street clothes    1042 Pt taken via wheel chair with all belongings for discharge home with .

## 2025-02-28 NOTE — PRE SEDATION
Sedation Pre-Procedure Note    Patient Name: Aimee Villanueva   YOB: 1945  Room/Bed: Room/bed info not found  Medical Record Number: [id number]  Date: 2/28/2025   Time: 8:47 AM       Indication:  Mediport removal    Consent: I have discussed with the patient and/or the patient representative the indication, alternatives, and the possible risks and/or complications of the planned procedure and the anesthesia methods. The patient and/or patient representative appear to understand and agree to proceed.    Vital Signs:   Vitals:    02/28/25 0817   BP: (!) 183/76   Pulse: 72   Resp: 16   Temp: 98.2 °F (36.8 °C)   SpO2: 98%       Past Medical History:   has a past medical history of Acute combined systolic and diastolic CHF, NYHA class 1 (HCC), Adenomatous polyp of sigmoid colon, Adenomatous polyp of transverse colon, Anxiety, Asthma, Brainstem stroke (HCC), Cardiopulmonary arrest (HCC), CHF (congestive heart failure) (HCC), Chronic back pain, Chronic obstructive pulmonary disease with acute exacerbation (HCC), Depression, ESBL E. coli carrier, Fibromyalgia, Gastrointestinal hemorrhage, Hyperlipidemia, Hypertension, Infection due to ESBL-producing Escherichia coli, Insomnia, On home O2, Osteoarthritis, Seizures (HCC), Sepsis (HCC), Smoker, Type II diabetes mellitus, uncontrolled, and Unspecified sleep apnea.    Past Surgical History:   has a past surgical history that includes Upper gastrointestinal endoscopy (08/26/2016); Colonoscopy (08/29/2016); pr esophagogastroduodenoscopy transoral diagnostic (N/A, 03/24/2017); pr neuroplasty &/transpos median nrv carpal tunne (Left, 06/05/2017); pr laps abd prtm&omentum dx w/wo spec br/wa spx (N/A, 02/08/2018); pr rpr umbilical hrna 5 yrs/> reducible (N/A, 02/08/2018); back surgery (2017); Endoscopy, colon, diagnostic; eye surgery; Cardiac catheterization (11/03/2014); Tonsillectomy; hernia repair (2018); Dilation and curettage of uterus (N/A, 10/07/2019); Upper

## 2025-02-28 NOTE — OR NURSING
Sedation Administered    Pt brought to OR room 8 via cart and with monitor. Consent confirmed.  Pt transferred with minimal assistance onto procedure table in supine position. Connected to monitors and NC/end tidal CO2 applied with 2L O2. Pt A&Ox4, respirations even and unlabored, visible skin WNL. LR-RN & IB-RN along with Dr. Ford, confirmed with pt that she wishes to keep limited DNR in place during procedure of no CPR, no intubation, no defibrillation.      [employer] prepped Right neck and chest with large tinted chloraprep.     After 3 minute dry time, draped with full body drape using sterile technique by AR-tech.     Timeout completed. Conscious sedation given by CARL-RN, independent observer, per Dr. Ford verbal orders, see eMar.      Dr. Ford numbed site of existing mediport with 2% lidocaine with epinephrine, see eMar.     0923: Dr Ford made Right chest incision in skin at the proximal top end of mediport and tissue gently dissected from the mediport.  Pt tolerating well. VSS.     0930: Mediport removed gently per Dr Ford.  Manual pressure started by AR-tech. Mediport intact, length 26.5 cm.     Dr Ford irrigating the pocket with antibiotic solution and then drying with dry sterile gauze.  Pocket sutured close with vicryl 4-0. Pt tolerating well, VSS.      Manual pressure released, no oozing noted at site.     Topical skin affix applied to the Right chest incision site.  Pt condition unchanged during procedure, tolerated all well.      Second coat of skin affix applied. Verbal reassurance given to the pt. VSS.    Post image obtained.      Pt transferred to the cart with minimal assistance and taken to CT holding room for recovery.Electronically signed by Dimple Pal RN on 2/28/2025 at 9:49 AM      Total Sedation Given:  Versed:     0.5mg       Fentanyl:    50mcg

## 2025-02-28 NOTE — PROGRESS NOTES
Pt arrived to CT holding via wheel chair and assisted into gown.    0817 Pt's VSS. Pt denies pain.     0824 Pt's allergy list, medical history, and home medication list reviewed. Pt last took aspirin Friday, 2/21/25. Pt has not eaten or drank anything since before midnight. Pt took coreg this am with sip of water.     0827 Pt's blood sugar reading 380. Pt states she feels ok.     0840 Bethany LAZCANO at cart-side talking with pt regarding home medications, conscious sedation, and assessing pt.

## 2025-02-28 NOTE — DISCHARGE INSTRUCTIONS
Discharge Mediport Removal Instructions    ACTIVITY:   Rest today. Expect to be sore for 1 to 2 days. No heavy bending, lifting , stretching, pushing or pulling for at least 24 hours. Do not lift anything over 10 pounds for the next 24 - 48 hours. Do not drive today.      BATHING:   May shower, bathe, or swim after 24 hours.  Wash gently over the mediport removal site, DO NOT SCRUB at glue. Pat the site dry. Let the glue remain in place until site healed.  It may remain up to 2 weeks before peeling off.     DIET:   May resume your normal diet.     MEDICATION:  * Resume home medication unless otherwise instructed by your physician.  * (If Applicable) If taking any blood thinners or Aspirin, hold for 24 hours after mediport removal.  * May take mild pain reliever, as needed, such as Acetaminophen or Ibuprofen.      COMPLICATIONS RELATED TO MEDIPORT REMOVAL:   - Dizziness, weakness, fatigue  - Bruising/firmness/ and/or pain at the site or in the neck/chest area.  - Extreme pain after leaving the hospital.   - Large or heavy bleeding at removal site or up in the neck/chest area.   IF THESE SYMPTOMS OCCUR AND BECOME SEVERE, REPORT TO THE EMERGENCY ROOM FOR FURTHER EVALUATION.     For the next 48 hours watch site for redness, drainage, swelling , fever (temp above 101 degrees F), or chills.   If these signs of infection occur contact DR. HSU at [phone].  If you have questions of concerns tonight please call [facility] at [phone] and ask to speak to a RADIOLOGY RN.    If you are unable to contact the above physician and you feel you have a major problem, please go to the Emergency Room for treatment.

## 2025-03-03 ENCOUNTER — TELEPHONE (OUTPATIENT)
Dept: INTERVENTIONAL RADIOLOGY/VASCULAR | Age: 80
End: 2025-03-03

## 2025-03-03 NOTE — TELEPHONE ENCOUNTER
Post procedure phone call placed. This patient had a Mediport removal with Dr. Ford on 02/28/2025. I spoke to a family member who denies any problems,complications, or issues due to the removal. The family member stated that the patient is doing well. Denies any issues with the removal site. Patient did feel a little groggy after returning home but has rested several days now and is doing fine.

## 2025-03-14 DIAGNOSIS — Z79.4 TYPE 2 DIABETES MELLITUS WITH OTHER SPECIFIED COMPLICATION, WITH LONG-TERM CURRENT USE OF INSULIN: ICD-10-CM

## 2025-03-14 DIAGNOSIS — E11.69 TYPE 2 DIABETES MELLITUS WITH OTHER SPECIFIED COMPLICATION, WITH LONG-TERM CURRENT USE OF INSULIN: ICD-10-CM

## 2025-03-14 LAB
ALBUMIN SERPL-MCNC: 3.7 G/DL (ref 3.5–4.6)
ALP SERPL-CCNC: 143 U/L (ref 40–130)
ALT SERPL-CCNC: 14 U/L (ref 0–33)
ANION GAP SERPL CALCULATED.3IONS-SCNC: 10 MEQ/L (ref 9–15)
AST SERPL-CCNC: 14 U/L (ref 0–35)
BILIRUB SERPL-MCNC: <0.2 MG/DL (ref 0.2–0.7)
BUN SERPL-MCNC: 21 MG/DL (ref 8–23)
CALCIUM SERPL-MCNC: 9.4 MG/DL (ref 8.5–9.9)
CHLORIDE SERPL-SCNC: 103 MEQ/L (ref 95–107)
CO2 SERPL-SCNC: 24 MEQ/L (ref 20–31)
CREAT SERPL-MCNC: 1.58 MG/DL (ref 0.5–0.9)
ESTIMATED AVERAGE GLUCOSE: 243 MG/DL
GLOBULIN SER CALC-MCNC: 2.8 G/DL (ref 2.3–3.5)
GLUCOSE SERPL-MCNC: 355 MG/DL (ref 70–99)
HBA1C MFR BLD: 10.1 % (ref 4–6)
POTASSIUM SERPL-SCNC: 4.8 MEQ/L (ref 3.4–4.9)
PROT SERPL-MCNC: 6.5 G/DL (ref 6.3–8)
SODIUM SERPL-SCNC: 137 MEQ/L (ref 135–144)

## 2025-03-18 ENCOUNTER — OFFICE VISIT (OUTPATIENT)
Dept: ENDOCRINOLOGY | Age: 80
End: 2025-03-18
Payer: MEDICARE

## 2025-03-18 VITALS
HEIGHT: 59 IN | HEART RATE: 71 BPM | DIASTOLIC BLOOD PRESSURE: 68 MMHG | BODY MASS INDEX: 28.22 KG/M2 | SYSTOLIC BLOOD PRESSURE: 170 MMHG | WEIGHT: 140 LBS

## 2025-03-18 DIAGNOSIS — Z79.4 TYPE 2 DIABETES MELLITUS WITH OTHER SPECIFIED COMPLICATION, WITH LONG-TERM CURRENT USE OF INSULIN: Primary | ICD-10-CM

## 2025-03-18 DIAGNOSIS — E11.69 TYPE 2 DIABETES MELLITUS WITH OTHER SPECIFIED COMPLICATION, WITH LONG-TERM CURRENT USE OF INSULIN: Primary | ICD-10-CM

## 2025-03-18 LAB
CHP ED QC CHECK: NORMAL
GLUCOSE BLD-MCNC: 132 MG/DL

## 2025-03-18 PROCEDURE — 1159F MED LIST DOCD IN RCRD: CPT | Performed by: PHYSICIAN ASSISTANT

## 2025-03-18 PROCEDURE — 99214 OFFICE O/P EST MOD 30 MIN: CPT | Performed by: PHYSICIAN ASSISTANT

## 2025-03-18 PROCEDURE — 1123F ACP DISCUSS/DSCN MKR DOCD: CPT | Performed by: PHYSICIAN ASSISTANT

## 2025-03-18 PROCEDURE — 1125F AMNT PAIN NOTED PAIN PRSNT: CPT | Performed by: PHYSICIAN ASSISTANT

## 2025-03-18 PROCEDURE — 3078F DIAST BP <80 MM HG: CPT | Performed by: PHYSICIAN ASSISTANT

## 2025-03-18 PROCEDURE — 3077F SYST BP >= 140 MM HG: CPT | Performed by: PHYSICIAN ASSISTANT

## 2025-03-18 PROCEDURE — 3046F HEMOGLOBIN A1C LEVEL >9.0%: CPT | Performed by: PHYSICIAN ASSISTANT

## 2025-03-18 PROCEDURE — 82962 GLUCOSE BLOOD TEST: CPT | Performed by: PHYSICIAN ASSISTANT

## 2025-03-18 RX ORDER — INSULIN GLARGINE 100 [IU]/ML
INJECTION, SOLUTION SUBCUTANEOUS
Qty: 10 ADJUSTABLE DOSE PRE-FILLED PEN SYRINGE | Refills: 3 | Status: SHIPPED | OUTPATIENT
Start: 2025-03-18

## 2025-03-18 ASSESSMENT — ENCOUNTER SYMPTOMS
WHEEZING: 0
SHORTNESS OF BREATH: 0
COUGH: 0
RHINORRHEA: 0
ABDOMINAL PAIN: 0
SORE THROAT: 0
NAUSEA: 0
SINUS PRESSURE: 0
VOMITING: 0
EYE REDNESS: 0
EYE PAIN: 0
DIARRHEA: 0

## 2025-03-18 NOTE — PATIENT INSTRUCTIONS
Increase Lantus inject 90 units at bedtime 11:00 PM (if lower than 150 then do not take it)   Increase Lantus 50 units in the morning  11:00 AM (if lower than 150 then do not take it)   Continue Humalog 26 units before Breakfast and lunch ( first meal 12-2) , 26 units mid-day snack, 38 units before dinner (third meal 6-8), 10-15 units for a snack   May take 1/2 of the Humalog dose if unsure about how much you will be eating.   Dexcom G7 being used   Follow up in 3 months

## 2025-03-18 NOTE — PROGRESS NOTES
14 03/14/2025    ALT 14 03/14/2025    LABGLOM 32.8 (L) 03/14/2025    GFRAA 31.1 (L) 05/18/2022    GLOB 2.8 03/14/2025     Lab Results   Component Value Date    WBC 4.9 02/26/2025    HGB 12.6 02/26/2025    HCT 39.9 02/26/2025    MCV 88.7 02/26/2025     02/26/2025     Lab Results   Component Value Date    LABA1C 10.1 (H) 03/14/2025    LABA1C 10.6 (H) 09/06/2024    LABA1C 8.8 (H) 01/30/2024     Lab Results   Component Value Date    HDL 39 (L) 02/01/2024    HDL 33 (L) 04/07/2022    HDL 61 (H) 11/30/2020    CHOL 123 02/01/2024    CHOL 106 04/07/2022    CHOL 143 11/30/2020    TRIG 163 (H) 02/01/2024    TRIG 183 (H) 04/07/2022    TRIG 64 11/30/2020     No results found for: \"TESTM\"  Lab Results   Component Value Date    TSH 0.595 10/03/2024    TSH 0.986 05/18/2022    TSH 0.474 11/30/2020     No results found for: \"TPOABS\"    Review of Systems   Constitutional:  Positive for fatigue. Negative for chills and fever.   HENT:  Negative for congestion, ear pain, postnasal drip, rhinorrhea, sinus pressure and sore throat.    Eyes:  Negative for pain and redness.   Respiratory:  Negative for cough, shortness of breath and wheezing.    Cardiovascular:  Negative for chest pain, palpitations and leg swelling.   Gastrointestinal:  Negative for abdominal pain, diarrhea, nausea and vomiting.   Endocrine: Negative for polydipsia (improving) and polyuria.   Genitourinary:  Negative for difficulty urinating.   Musculoskeletal:  Negative for arthralgias.   Skin:  Negative for rash.   Allergic/Immunologic: Negative for environmental allergies.   Neurological:  Negative for dizziness and headaches.   Hematological:  Negative for adenopathy.   Psychiatric/Behavioral:  Negative for dysphoric mood.        Objective:   Physical Exam  Vitals reviewed.   Constitutional:       Appearance: Normal appearance. She is well-developed. She is not ill-appearing.   HENT:      Head: Normocephalic and atraumatic.      Nose: No congestion.   Eyes:

## 2025-03-26 ENCOUNTER — OFFICE VISIT (OUTPATIENT)
Dept: PALLATIVE CARE | Age: 80
End: 2025-03-26
Payer: MEDICARE

## 2025-03-26 VITALS — HEART RATE: 72 BPM | DIASTOLIC BLOOD PRESSURE: 60 MMHG | SYSTOLIC BLOOD PRESSURE: 130 MMHG | OXYGEN SATURATION: 97 %

## 2025-03-26 DIAGNOSIS — E11.42 DIABETIC PERIPHERAL NEUROPATHY (HCC): Primary | ICD-10-CM

## 2025-03-26 DIAGNOSIS — Z95.828 PORT-A-CATH IN PLACE: ICD-10-CM

## 2025-03-26 DIAGNOSIS — R45.86 EMOTIONAL LABILITY: ICD-10-CM

## 2025-03-26 DIAGNOSIS — I50.42 CHRONIC COMBINED SYSTOLIC AND DIASTOLIC CHF (CONGESTIVE HEART FAILURE) (HCC): ICD-10-CM

## 2025-03-26 DIAGNOSIS — Z87.19 HISTORY OF GI BLEED: ICD-10-CM

## 2025-03-26 DIAGNOSIS — F48.2 PSEUDOBULBAR AFFECT: ICD-10-CM

## 2025-03-26 DIAGNOSIS — M54.16 LUMBAR RADICULOPATHY: ICD-10-CM

## 2025-03-26 DIAGNOSIS — Q27.30 AVM (ARTERIOVENOUS MALFORMATION): ICD-10-CM

## 2025-03-26 DIAGNOSIS — F33.1 MODERATE RECURRENT MAJOR DEPRESSION (HCC): ICD-10-CM

## 2025-03-26 DIAGNOSIS — Z51.5 PALLIATIVE CARE ENCOUNTER: ICD-10-CM

## 2025-03-26 DIAGNOSIS — J44.9 CHRONIC OBSTRUCTIVE PULMONARY DISEASE, UNSPECIFIED COPD TYPE (HCC): ICD-10-CM

## 2025-03-26 PROCEDURE — 3046F HEMOGLOBIN A1C LEVEL >9.0%: CPT | Performed by: NURSE PRACTITIONER

## 2025-03-26 PROCEDURE — 3078F DIAST BP <80 MM HG: CPT | Performed by: NURSE PRACTITIONER

## 2025-03-26 PROCEDURE — 99349 HOME/RES VST EST MOD MDM 40: CPT | Performed by: NURSE PRACTITIONER

## 2025-03-26 PROCEDURE — 1123F ACP DISCUSS/DSCN MKR DOCD: CPT | Performed by: NURSE PRACTITIONER

## 2025-03-26 PROCEDURE — 3075F SYST BP GE 130 - 139MM HG: CPT | Performed by: NURSE PRACTITIONER

## 2025-03-26 RX ORDER — GABAPENTIN 100 MG/1
200 CAPSULE ORAL 2 TIMES DAILY
Qty: 120 CAPSULE | Refills: 0 | Status: SHIPPED | OUTPATIENT
Start: 2025-03-26 | End: 2025-04-25

## 2025-03-26 ASSESSMENT — ENCOUNTER SYMPTOMS
BLOOD IN STOOL: 0
ABDOMINAL PAIN: 0
TROUBLE SWALLOWING: 0
CONSTIPATION: 0
RESPIRATORY NEGATIVE: 1
DIARRHEA: 0
BACK PAIN: 0

## 2025-03-26 NOTE — PROGRESS NOTES
g chewable tablet Take 4 tablets by mouth as needed for Low blood sugar 60 tablet 3    blood glucose monitor kit and supplies Give 1 meter covered by insurance 1 kit 0    Handicap Placard MISC by Does not apply route Good from 10/6/22 till 10/6/27 1 each 0    Glucagon (GVOKE HYPOPEN 2-PACK) 1 MG/0.2ML SOAJ Inject 1 mg into the skin every 15 minutes as needed (glucose less tahtn 70 or if symptomatic) 2 each 3    albuterol-ipratropium (COMBIVENT RESPIMAT)  MCG/ACT AERS inhaler Inhale 1 puff into the lungs every 6 hours as needed for Wheezing or Shortness of Breath 4 g 5    ipratropium-albuterol (DUONEB) 0.5-2.5 (3) MG/3ML SOLN nebulizer solution Inhale 3 mLs into the lungs 3 times daily (Patient not taking: Reported on 2/28/2025) 360 mL 0    Cholecalciferol (VITAMIN D3) 50 MCG (2000 UT) CAPS Take 1,000 Units by mouth 2 times daily       Blood Glucose Monitoring Suppl (ACCU-CHEK PHYLLIS CONNECT) w/Device KIT 1 kit by Does not apply route daily 1 kit 0     No current facility-administered medications on file prior to visit.     Review of Systems   Constitutional:  Negative for activity change, appetite change and unexpected weight change.   HENT:  Negative for trouble swallowing.    Respiratory: Negative.     Cardiovascular: Negative.    Gastrointestinal:  Negative for abdominal pain, blood in stool, constipation and diarrhea.   Genitourinary: Negative.    Musculoskeletal:  Positive for arthralgias, gait problem and myalgias. Negative for back pain.   Skin: Negative.    Neurological:  Positive for weakness (Baseline.). Negative for speech difficulty (improved).   Psychiatric/Behavioral:  Negative for dysphoric mood and suicidal ideas.           Objective:   /60   Pulse 72   LMP 09/30/1995   SpO2 97%    Wt Readings from Last 3 Encounters:   03/18/25 63.5 kg (140 lb)   02/26/25 63.5 kg (140 lb)   02/11/25 63.5 kg (140 lb)     Physical Exam  Constitutional:       General: She is not in acute distress.  HENT:

## 2025-04-04 ENCOUNTER — PATIENT MESSAGE (OUTPATIENT)
Age: 80
End: 2025-04-04

## 2025-05-07 ENCOUNTER — OFFICE VISIT (OUTPATIENT)
Age: 80
End: 2025-05-07
Payer: MEDICARE

## 2025-05-07 VITALS
HEART RATE: 78 BPM | BODY MASS INDEX: 28.22 KG/M2 | TEMPERATURE: 98 F | OXYGEN SATURATION: 93 % | HEIGHT: 59 IN | WEIGHT: 140 LBS

## 2025-05-07 DIAGNOSIS — M54.16 LUMBAR RADICULOPATHY: Primary | ICD-10-CM

## 2025-05-07 DIAGNOSIS — M51.26 LUMBAR HERNIATED DISC: ICD-10-CM

## 2025-05-07 PROCEDURE — 99214 OFFICE O/P EST MOD 30 MIN: CPT | Performed by: PAIN MEDICINE

## 2025-05-07 PROCEDURE — 1125F AMNT PAIN NOTED PAIN PRSNT: CPT | Performed by: PAIN MEDICINE

## 2025-05-07 PROCEDURE — 1123F ACP DISCUSS/DSCN MKR DOCD: CPT | Performed by: PAIN MEDICINE

## 2025-05-07 PROCEDURE — 1159F MED LIST DOCD IN RCRD: CPT | Performed by: PAIN MEDICINE

## 2025-05-07 ASSESSMENT — ENCOUNTER SYMPTOMS
CONSTIPATION: 1
DIARRHEA: 0
SHORTNESS OF BREATH: 0
BACK PAIN: 1
NAUSEA: 0

## 2025-05-07 NOTE — PROGRESS NOTES
[facility]  [facility]  [address], [address]  [place], [place]  P: [phone]  F: [phone]        Aimee Villanueva  (1945)    5/7/2025    Subjective:     Aimee Villanueva is 79 y.o. female who complains today of:    Chief Complaint   Patient presents with    Follow-up       HPI    Pain is located in her right lower back that radiates into the right hip and lateral. She had this pain before, but they resolved after her TFESI. She had great relief for several months (Injection was in June 2024).    She is also s/p RFA.     S/P RIGHT L4-5 TFESI 2/26/2025. She had initial relief, but it did not last as long as the last time in June of 2024.     Pain is  acute on chronic . Pain returned in the right lower back and leg. We discussed repeating successful TFESI.   Pain is described as throbbing, aching, sharp, and shooting.  Pain level today is rated 10 on a 10 point scale.  It is severe in nature.  With pain medication, pain level drops to a 7/10.  Without pain medication, pain level can escalate upto a 10/10.  Pain is affecting the patients ability to perform activities of daily living especially with regards to personal hygiene, household chores and self care.  With pain medication, the patient is better able to perform ADLs.  Pain in part is secondary to osteoarthritis of the spine.  The patient is tolerating her pain medication (nothing has been working lately. She has had adverse reactions in the past, so she stopped tylenol and NSAIDs) regimen without any adverse effects.  Pain is aggravated by bending, lifting, twisting, walking, and standing  Pain is not associated with fevers/chills/night sweats.  Bowel and bladder are working appropriately.  No new issues with balance, requires a cane for balance and ambulation.   No new paraesthesias noted.  On stool softener for constipation. Working well.         Allergies:  Ibuprofen,

## 2025-05-08 ENCOUNTER — TELEPHONE (OUTPATIENT)
Age: 80
End: 2025-05-08

## 2025-05-08 NOTE — TELEPHONE ENCOUNTER
RIGHT L3-4,L4-5 TFESI     NO AUTH REQUIRED  OK to schedule procedure approved as above.   Please note sides/levels approved and date range.   (If applicable, sides/levels approved may differ from those ordered)    TO BE SCHEDULED WITH     ALSO PLEASE SCHEDULE 2 WEEK POST-OP FOLLOW UP

## 2025-05-08 NOTE — TELEPHONE ENCOUNTER
Letter sent to Dr. Scot Blair to stop ASA for procedure. Will wait for response to call and schedule procedure.   RIGHT L3-4,L4-5 TFESI      NO AUTH REQUIRED

## 2025-05-09 NOTE — TELEPHONE ENCOUNTER
Received permission from  Dr Scot Blair  for patient to hold ASA (7 DAYS).    Please Call patient to schedule procedure with Dr Norman.     RIGHT L3-4,L4-5 TFESI      NO AUTH REQUIRED

## 2025-05-14 NOTE — TELEPHONE ENCOUNTER
Call placed to patient procedure scheduled. Patients spouse informed to hold ASA for 7 days. Verbalized understanding.   RIGHT L3-4,L4-5 TFESI      NO AUTH REQUIRED

## 2025-05-20 ENCOUNTER — OFFICE VISIT (OUTPATIENT)
Age: 80
End: 2025-05-20
Payer: MEDICARE

## 2025-05-20 VITALS
HEART RATE: 71 BPM | OXYGEN SATURATION: 99 % | DIASTOLIC BLOOD PRESSURE: 88 MMHG | SYSTOLIC BLOOD PRESSURE: 122 MMHG | TEMPERATURE: 97.9 F

## 2025-05-20 DIAGNOSIS — M54.16 LUMBAR RADICULOPATHY: ICD-10-CM

## 2025-05-20 DIAGNOSIS — F48.2 PSEUDOBULBAR AFFECT: ICD-10-CM

## 2025-05-20 DIAGNOSIS — Z51.5 PALLIATIVE CARE ENCOUNTER: ICD-10-CM

## 2025-05-20 DIAGNOSIS — R45.86 EMOTIONAL LABILITY: ICD-10-CM

## 2025-05-20 DIAGNOSIS — F33.1 MODERATE RECURRENT MAJOR DEPRESSION (HCC): ICD-10-CM

## 2025-05-20 DIAGNOSIS — I50.42 CHRONIC COMBINED SYSTOLIC AND DIASTOLIC CHF (CONGESTIVE HEART FAILURE) (HCC): Primary | ICD-10-CM

## 2025-05-20 DIAGNOSIS — J44.9 CHRONIC OBSTRUCTIVE PULMONARY DISEASE, UNSPECIFIED COPD TYPE (HCC): ICD-10-CM

## 2025-05-20 DIAGNOSIS — E11.42 DIABETIC PERIPHERAL NEUROPATHY (HCC): ICD-10-CM

## 2025-05-20 PROCEDURE — 99349 HOME/RES VST EST MOD MDM 40: CPT | Performed by: NURSE PRACTITIONER

## 2025-05-20 PROCEDURE — 3046F HEMOGLOBIN A1C LEVEL >9.0%: CPT | Performed by: NURSE PRACTITIONER

## 2025-05-20 PROCEDURE — 3079F DIAST BP 80-89 MM HG: CPT | Performed by: NURSE PRACTITIONER

## 2025-05-20 PROCEDURE — 3074F SYST BP LT 130 MM HG: CPT | Performed by: NURSE PRACTITIONER

## 2025-05-20 PROCEDURE — 1123F ACP DISCUSS/DSCN MKR DOCD: CPT | Performed by: NURSE PRACTITIONER

## 2025-05-20 PROCEDURE — 1159F MED LIST DOCD IN RCRD: CPT | Performed by: NURSE PRACTITIONER

## 2025-05-20 RX ORDER — FERROUS SULFATE 325(65) MG
325 TABLET, DELAYED RELEASE (ENTERIC COATED) ORAL
COMMUNITY

## 2025-05-20 ASSESSMENT — ENCOUNTER SYMPTOMS
TROUBLE SWALLOWING: 0
BLOOD IN STOOL: 0
ABDOMINAL PAIN: 0
CONSTIPATION: 0
RESPIRATORY NEGATIVE: 1
BACK PAIN: 0
DIARRHEA: 0

## 2025-05-20 NOTE — PROGRESS NOTES
Subjective:      Patient Id: Seen Aimee at home in [place] for follow-up palliative medicine visit. She was accompanied to the appointment by: spouse, Delano. .    Chief Complaint   Patient presents with    Follow-up    Congestive Heart Failure    COPD    Other     Pseudobulbar effect        HPI       Aimee Villanueva is a 79 y.o. female with a complex medical history that includes GI bleeding throughout the distal small intestine, AVM malformation, anxiety, asthma, CHF, COPD, CKD depression, fibromyalgia, OA, cardiac arrest, seizures, DM II, CVA, sleep apnea, and pseudobulbar affect.    General: Patient is alert, oriented x 4. Sitting on couch in NAD.      CHF/COPD: Stable. No leg swelling. Following with Dr. Blair from cardiology. Patient reports she rarely has SOB. Does not use inhalers often.     GI: GERD stable. No issues with GI bleeding. Diarrhea at times but depends on what she eats.     Mood: Per  patient has moodiness on and off from pseudobulbar effect. She had restarted the neudexta but it cuased dizziness and was stopped.  states he can usually calm her down when she is frazzled by reassuring her.     Nose bleeds: Had one on Sunday that resolved on its own.      DM: Sees endo. BS run high. 300's seem to her normal.     Pain: Having recurrence of pain in right hip/lateral leg. Had recent injection. Previous procedure lasted several months. Patient is already feeling pain again after one month. Rating pain at a 9/10. Patient having right L3-4, L4-5 TFESI tomorrow. She uses topical cream that will help. Tylenol does not help. Gabapentin did not help neuropathy in her hands. So she stopped it.       Past Medical History:   Diagnosis Date    Acute combined systolic and diastolic CHF, NYHA class 1 (HCC) 03/24/2019    Adenomatous polyp of sigmoid colon     Adenomatous polyp of transverse colon     Anxiety     Asthma     dx 2019 / has smoked since age 15    Brainstem stroke ([facility])

## 2025-05-21 ENCOUNTER — HOSPITAL ENCOUNTER (OUTPATIENT)
Age: 80
Discharge: HOME | End: 2025-05-21
Payer: MEDICARE

## 2025-05-21 VITALS
OXYGEN SATURATION: 99 % | DIASTOLIC BLOOD PRESSURE: 70 MMHG | TEMPERATURE: 97.8 F | HEIGHT: 59 IN | BODY MASS INDEX: 28.22 KG/M2 | SYSTOLIC BLOOD PRESSURE: 128 MMHG | WEIGHT: 140 LBS | HEART RATE: 70 BPM

## 2025-05-21 DIAGNOSIS — R52 PAIN: ICD-10-CM

## 2025-05-21 PROCEDURE — 64484 NJX AA&/STRD TFRM EPI L/S EA: CPT | Performed by: PAIN MEDICINE

## 2025-05-21 PROCEDURE — 2580000003 HC RX 258: Performed by: PAIN MEDICINE

## 2025-05-21 PROCEDURE — 6360000002 HC RX W HCPCS: Performed by: PAIN MEDICINE

## 2025-05-21 PROCEDURE — 2500000003 HC RX 250 WO HCPCS: Performed by: PAIN MEDICINE

## 2025-05-21 PROCEDURE — 64483 NJX AA&/STRD TFRM EPI L/S 1: CPT | Performed by: PAIN MEDICINE

## 2025-05-21 PROCEDURE — 6360000004 HC RX CONTRAST MEDICATION: Performed by: PAIN MEDICINE

## 2025-05-21 RX ORDER — LIDOCAINE HYDROCHLORIDE 10 MG/ML
2 INJECTION, SOLUTION EPIDURAL; INFILTRATION; INTRACAUDAL; PERINEURAL ONCE
Status: COMPLETED | OUTPATIENT
Start: 2025-05-21 | End: 2025-05-21

## 2025-05-21 RX ORDER — DEXAMETHASONE SODIUM PHOSPHATE 10 MG/ML
5 INJECTION, SOLUTION INTRAMUSCULAR; INTRAVENOUS ONCE
Status: COMPLETED | OUTPATIENT
Start: 2025-05-21 | End: 2025-05-21

## 2025-05-21 RX ORDER — SODIUM CHLORIDE 9 MG/ML
1 INJECTION, SOLUTION INTRAMUSCULAR; INTRAVENOUS; SUBCUTANEOUS ONCE
Status: COMPLETED | OUTPATIENT
Start: 2025-05-21 | End: 2025-05-21

## 2025-05-21 RX ORDER — SODIUM CHLORIDE 9 MG/ML
1 INJECTION, SOLUTION INTRAMUSCULAR; INTRAVENOUS; SUBCUTANEOUS 2 TIMES DAILY
Status: DISCONTINUED | OUTPATIENT
Start: 2025-05-21 | End: 2025-05-22 | Stop reason: HOSPADM

## 2025-05-21 RX ORDER — INDOMETHACIN 25 MG/1
50 CAPSULE ORAL ONCE
Status: COMPLETED | OUTPATIENT
Start: 2025-05-21 | End: 2025-05-21

## 2025-05-21 RX ADMIN — SODIUM CHLORIDE 1 ML: 9 INJECTION INTRAMUSCULAR; INTRAVENOUS; SUBCUTANEOUS at 13:58

## 2025-05-21 RX ADMIN — LIDOCAINE HYDROCHLORIDE 2 ML: 10 INJECTION, SOLUTION EPIDURAL; INFILTRATION; INTRACAUDAL; PERINEURAL at 13:30

## 2025-05-21 RX ADMIN — SODIUM BICARBONATE 1 ML: 84 INJECTION, SOLUTION INTRAVENOUS at 13:58

## 2025-05-21 RX ADMIN — DEXAMETHASONE SODIUM PHOSPHATE 5 MG: 10 INJECTION INTRAMUSCULAR; INTRAVENOUS at 13:29

## 2025-05-21 RX ADMIN — IOHEXOL 1 ML: 300 INJECTION, SOLUTION INTRAVENOUS at 13:30

## 2025-05-21 ASSESSMENT — PAIN SCALES - GENERAL
PAINLEVEL_OUTOF10: 10
PAINLEVEL_OUTOF10: 9

## 2025-05-21 ASSESSMENT — PAIN DESCRIPTION - LOCATION
LOCATION: BACK
LOCATION: BACK

## 2025-06-09 DIAGNOSIS — Z87.19 HISTORY OF GI BLEED: ICD-10-CM

## 2025-06-09 RX ORDER — PANTOPRAZOLE SODIUM 40 MG/1
TABLET, DELAYED RELEASE ORAL
Qty: 90 TABLET | Refills: 0 | Status: SHIPPED | OUTPATIENT
Start: 2025-06-09

## 2025-06-30 ENCOUNTER — OFFICE VISIT (OUTPATIENT)
Age: 80
End: 2025-06-30

## 2025-06-30 VITALS
HEART RATE: 72 BPM | TEMPERATURE: 98.2 F | DIASTOLIC BLOOD PRESSURE: 83 MMHG | SYSTOLIC BLOOD PRESSURE: 139 MMHG | OXYGEN SATURATION: 98 %

## 2025-06-30 DIAGNOSIS — M54.16 LUMBAR RADICULOPATHY: ICD-10-CM

## 2025-06-30 DIAGNOSIS — J44.9 CHRONIC OBSTRUCTIVE PULMONARY DISEASE, UNSPECIFIED COPD TYPE (HCC): ICD-10-CM

## 2025-06-30 DIAGNOSIS — F48.2 PSEUDOBULBAR AFFECT: ICD-10-CM

## 2025-06-30 DIAGNOSIS — F41.9 ANXIETY: Primary | ICD-10-CM

## 2025-06-30 DIAGNOSIS — M47.26 OTHER SPONDYLOSIS WITH RADICULOPATHY, LUMBAR REGION: ICD-10-CM

## 2025-06-30 DIAGNOSIS — R45.86 EMOTIONAL LABILITY: ICD-10-CM

## 2025-06-30 DIAGNOSIS — I50.42 CHRONIC COMBINED SYSTOLIC AND DIASTOLIC CHF (CONGESTIVE HEART FAILURE) (HCC): ICD-10-CM

## 2025-06-30 DIAGNOSIS — E11.42 DIABETIC PERIPHERAL NEUROPATHY (HCC): ICD-10-CM

## 2025-06-30 DIAGNOSIS — F33.1 MODERATE RECURRENT MAJOR DEPRESSION (HCC): ICD-10-CM

## 2025-06-30 RX ORDER — HYDROXYZINE HYDROCHLORIDE 25 MG/1
25 TABLET, FILM COATED ORAL EVERY 8 HOURS PRN
Qty: 30 TABLET | Refills: 0 | Status: SHIPPED | OUTPATIENT
Start: 2025-06-30 | End: 2025-06-30

## 2025-06-30 RX ORDER — ACETAMINOPHEN AND CODEINE PHOSPHATE 300; 15 MG/1; MG/1
1 TABLET ORAL EVERY 8 HOURS PRN
Qty: 45 TABLET | Refills: 0 | Status: SHIPPED | OUTPATIENT
Start: 2025-06-30 | End: 2025-07-02

## 2025-06-30 RX ORDER — HYDROXYZINE HYDROCHLORIDE 25 MG/1
25 TABLET, FILM COATED ORAL EVERY 8 HOURS PRN
Qty: 30 TABLET | Refills: 0 | Status: SHIPPED | OUTPATIENT
Start: 2025-06-30 | End: 2025-07-10

## 2025-06-30 ASSESSMENT — ENCOUNTER SYMPTOMS
DIARRHEA: 0
TROUBLE SWALLOWING: 0
BACK PAIN: 0
CONSTIPATION: 0
BLOOD IN STOOL: 0
RESPIRATORY NEGATIVE: 1
ABDOMINAL PAIN: 0

## 2025-06-30 NOTE — PROGRESS NOTES
Subjective:      Patient Id: Seen Aimee at home in [place] for follow-up palliative medicine visit. She was accompanied to the appointment by: spouse, Delano.    Chief Complaint   Patient presents with    Follow-up    Pain    Dementia        HPI       Aimee Villanueva is a 79 y.o. female with a complex medical history that includes GI bleeding throughout the distal small intestine, AVM malformation, anxiety, asthma, CHF, COPD, CKD depression, fibromyalgia, OA, cardiac arrest, seizures, DM II, CVA, sleep apnea, and pseudobulbar affect.    Patient with complaints of pain and anxiety. Patient follows with pcp, pain management and neurology, cardiology.     General: Patient is alert, oriented x 4 with intermittent confusion. . Sitting on couch in NAD.      CHF/COPD: Stable. No leg swelling. Following with Dr. Blair from cardiology. Patient reports she rarely has SOB. Does not use inhalers often. No new issues.     GI: GERD stable. No issues with GI bleeding. Diarrhea at times but depends on what she eats.     Mood: Per  patient has moodiness on and off from pseudobulbar effect.  states he can usually calm her down when she is frazzled by reassuring her. Patient often has issues with speech due to previous stroke. Per  when this occurs patient is anxious.     Nose bleeds: Patient reports she has had two nose bleeds last week.     DM: Sees zoran. BS run high. 300's seem to her normal. Has difficulty with medications because sometimes she does not eat.     Pain: Having recurrence of pain in right hip/lateral leg. Had recent injection. Previous procedure lasted several months. Patient is already feeling pain again after one month. Rating pain at a 9/10.  She uses topical cream that will help. Gabapentin did not help neuropathy in her hands. So she stopped it. Patient had lumbar transforaminal injection on 5/21/25. Per  the injection did not help for long.  Tylenol as needed which is helping

## 2025-07-02 ENCOUNTER — TELEPHONE (OUTPATIENT)
Age: 80
End: 2025-07-02

## 2025-07-02 RX ORDER — ACETAMINOPHEN AND CODEINE PHOSPHATE 300; 15 MG/1; MG/1
1 TABLET ORAL EVERY 8 HOURS PRN
Qty: 21 TABLET | Refills: 0 | Status: SHIPPED | OUTPATIENT
Start: 2025-07-02 | End: 2025-07-09

## 2025-07-02 NOTE — TELEPHONE ENCOUNTER
[facility] Pharmacist called and stated the subsequent fill for the Tylenol with codeine can only be filled for 7 days, there would need to be another script written for the remainder.

## 2025-07-14 ENCOUNTER — OFFICE VISIT (OUTPATIENT)
Age: 80
End: 2025-07-14
Payer: MEDICARE

## 2025-07-14 ENCOUNTER — HOSPITAL ENCOUNTER (OUTPATIENT)
Dept: GENERAL RADIOLOGY | Age: 80
Discharge: HOME | End: 2025-07-16
Payer: MEDICARE

## 2025-07-14 VITALS
OXYGEN SATURATION: 98 % | WEIGHT: 145 LBS | BODY MASS INDEX: 29.23 KG/M2 | SYSTOLIC BLOOD PRESSURE: 120 MMHG | TEMPERATURE: 98 F | HEART RATE: 82 BPM | HEIGHT: 59 IN | DIASTOLIC BLOOD PRESSURE: 74 MMHG

## 2025-07-14 VITALS
HEART RATE: 70 BPM | BODY MASS INDEX: 29.23 KG/M2 | OXYGEN SATURATION: 99 % | HEIGHT: 59 IN | WEIGHT: 145 LBS | TEMPERATURE: 97.4 F
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
## 2025-07-14 DIAGNOSIS — M25.571 PAIN AND SWELLING OF RIGHT ANKLE: ICD-10-CM

## 2025-07-14 DIAGNOSIS — S82.831A OTHER CLOSED FRACTURE OF DISTAL END OF RIGHT FIBULA, INITIAL ENCOUNTER: Primary | ICD-10-CM

## 2025-07-14 DIAGNOSIS — M25.471 PAIN AND SWELLING OF RIGHT ANKLE: ICD-10-CM

## 2025-07-14 DIAGNOSIS — S99.911A INJURY OF RIGHT ANKLE, INITIAL ENCOUNTER: ICD-10-CM
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
## 2025-07-14 PROCEDURE — 99204 OFFICE O/P NEW MOD 45 MIN: CPT | Performed by: PHYSICIAN ASSISTANT

## 2025-07-14 PROCEDURE — 1123F ACP DISCUSS/DSCN MKR DOCD: CPT | Performed by: NURSE PRACTITIONER

## 2025-07-14 PROCEDURE — 1159F MED LIST DOCD IN RCRD: CPT | Performed by: NURSE PRACTITIONER

## 2025-07-14 PROCEDURE — 1159F MED LIST DOCD IN RCRD: CPT | Performed by: PHYSICIAN ASSISTANT

## 2025-07-14 PROCEDURE — 3074F SYST BP LT 130 MM HG: CPT | Performed by: NURSE PRACTITIONER

## 2025-07-14 PROCEDURE — 3078F DIAST BP <80 MM HG: CPT | Performed by: NURSE PRACTITIONER

## 2025-07-14 PROCEDURE — 1123F ACP DISCUSS/DSCN MKR DOCD: CPT | Performed by: PHYSICIAN ASSISTANT

## 2025-07-14 PROCEDURE — 1125F AMNT PAIN NOTED PAIN PRSNT: CPT | Performed by: NURSE PRACTITIONER

## 2025-07-14 PROCEDURE — 73610 X-RAY EXAM OF ANKLE: CPT

## 2025-07-14 PROCEDURE — 99213 OFFICE O/P EST LOW 20 MIN: CPT | Performed by: NURSE PRACTITIONER

## 2025-07-14 PROCEDURE — 1125F AMNT PAIN NOTED PAIN PRSNT: CPT | Performed by: PHYSICIAN ASSISTANT

## 2025-07-14 PROCEDURE — 27786 TREATMENT OF ANKLE FRACTURE: CPT | Performed by: PHYSICIAN ASSISTANT

## 2025-07-14 SDOH — ECONOMIC STABILITY: FOOD INSECURITY: WITHIN THE PAST 12 MONTHS, YOU WORRIED THAT YOUR FOOD WOULD RUN OUT BEFORE YOU GOT MONEY TO BUY MORE.: NEVER TRUE

## 2025-07-14 SDOH — ECONOMIC STABILITY: FOOD INSECURITY: WITHIN THE PAST 12 MONTHS, THE FOOD YOU BOUGHT JUST DIDN'T LAST AND YOU DIDN'T HAVE MONEY TO GET MORE.: NEVER TRUE

## 2025-07-14 ASSESSMENT — ENCOUNTER SYMPTOMS
RESPIRATORY NEGATIVE: 1
COLOR CHANGE: 0
NAUSEA: 0
GASTROINTESTINAL NEGATIVE: 1
EYES NEGATIVE: 1

## 2025-07-14 NOTE — PROGRESS NOTES
Subjective  Aimee MARIA ALEJANDRA Villanueva, 79 y.o. female presents today with:  Chief Complaint   Patient presents with    left foot pain      Pt states she fell 7-13-25 an hurt her right foot its swollen pt says pain is a 9 out of ten        HPI  Presents to walk-in clinic for an injury   Injury occurred yesterday at her home   Affected site: right ankle   Per pt's son the fall occurred while she was trying to get to the restroom   Yesterday ice x1 & applied hemorrhoid cream   Today woke up with pain and swelling of the right ankle   Nothing for discomfort thus far   No further ice or elevation today   Denies prior injuries to the affected joint   Denies fever or chills                Past Medical History:   Diagnosis Date    Acute combined systolic and diastolic CHF, NYHA class 1 ([facility]) 03/24/2019    Adenomatous polyp of sigmoid colon     Adenomatous polyp of transverse colon     Anxiety     Asthma     dx 2019 / has smoked since age 15    Brainstem stroke ([facility])     Cardiopulmonary arrest ([facility])     CHF (congestive heart failure) ([facility])     Chronic back pain     Bilateral L5 S1 Radic on emg--surprisingly worse on the left than the right--pt's symptoms and her MRI show worse on the right    Chronic obstructive pulmonary disease with acute exacerbation ([facility]) 10/12/2019    Depression     ESBL E. coli carrier     Carrier.    Fibromyalgia     Gastrointestinal hemorrhage 02/24/2020    Hyperlipidemia     meds > 8 yrs    Hypertension     meds > 45 yrs    Infection due to ESBL-producing Escherichia coli     Insomnia 12/04/2013    On home O2     2l per n/c at bedtime mostly,     Osteoarthritis     Seizures ([facility])     Sepsis ([facility]) 10/06/2020    Smoker 06/18/2013    Type II diabetes mellitus, uncontrolled     hx > 8 yrs    Unspecified sleep apnea       Past Surgical History:   Procedure Laterality Date    BACK SURGERY  2017    lumbar disc    CARDIAC CATHETERIZATION  11/03/2014    DR. MIRELES / no stents    COLONOSCOPY  08/29/2016

## 2025-07-14 NOTE — PROGRESS NOTES
Aimee Villanueva (:  1945) is a 79 y.o. female,New patient, here for evaluation of the following chief complaint(s):  No chief complaint on file.         Assessment & Plan  Other closed fracture of distal end of right fibula, initial encounter   Acute condition, new, nondisplaced spiral fracture of the distal fibula.  Patient placed into a cam walker boot.  She will return in 3 weeks.           Assessment & Plan  1. Right ankle fracture:  Fracture in right fibula. Healing typically 6-10 weeks. Boot provided for mobility, to be worn except when sleeping or showering. No driving restrictions. Pain medication sent to pharmacy. Follow-up in 3 weeks with x-ray to assess healing. Possible transition from boot to brace depending on results.    Follow-up: In 3 weeks.    PROCEDURE  Boot applied to right foot.        No follow-ups on file.       Subjective   History of Present Illness  79-year-old female presents for evaluation of right ankle fracture.    Right Ankle Fracture  Reports fall while going to the bathroom, resulting in right fibula fracture. Experiencing pain in affected area, requests pain medication. Does not drive.  - Onset: Fall while going to the bathroom.  - Location: Right fibula.  - Character: Fracture with pain in the affected area.  - Alleviating Factors: Requests pain medication.         Review of Systems   Constitutional: Negative.    HENT: Negative.     Eyes: Negative.    Respiratory: Negative.     Gastrointestinal: Negative.    Genitourinary: Negative.    Musculoskeletal: Negative.    Psychiatric/Behavioral: Negative.            Results  - Imaging:    - X-ray: Right fibula fracture      Objective   Physical Exam  Constitutional:       Appearance: Normal appearance.   HENT:      Head: Normocephalic and atraumatic.      Mouth/Throat:      Mouth: Mucous membranes are moist.   Eyes:      Extraocular Movements: Extraocular movements intact.   Musculoskeletal:      Cervical back: Normal range

## 2025-08-15 ENCOUNTER — TELEPHONE (OUTPATIENT)
Age: 80
End: 2025-08-15

## 2025-08-18 ENCOUNTER — TELEPHONE (OUTPATIENT)
Age: 80
End: 2025-08-18

## 2025-08-21 ENCOUNTER — TELEPHONE (OUTPATIENT)
Age: 80
End: 2025-08-21

## 2025-09-01 DIAGNOSIS — Z87.19 HISTORY OF GI BLEED: ICD-10-CM

## 2025-09-02 ENCOUNTER — OFFICE VISIT (OUTPATIENT)
Age: 80
End: 2025-09-02
Payer: MEDICARE

## 2025-09-02 VITALS
WEIGHT: 140 LBS | HEIGHT: 59 IN | BODY MASS INDEX: 28.22 KG/M2 | SYSTOLIC BLOOD PRESSURE: 146 MMHG | TEMPERATURE: 97.9 F | DIASTOLIC BLOOD PRESSURE: 78 MMHG | HEART RATE: 76 BPM | OXYGEN SATURATION: 98 %

## 2025-09-02 DIAGNOSIS — M54.16 LUMBAR RADICULOPATHY: Primary | ICD-10-CM

## 2025-09-02 DIAGNOSIS — M48.061 LUMBAR FORAMINAL STENOSIS: ICD-10-CM

## 2025-09-02 DIAGNOSIS — M47.816 LUMBAR SPONDYLOSIS: ICD-10-CM

## 2025-09-02 DIAGNOSIS — M51.26 LUMBAR HERNIATED DISC: ICD-10-CM

## 2025-09-02 PROCEDURE — 1123F ACP DISCUSS/DSCN MKR DOCD: CPT | Performed by: PAIN MEDICINE

## 2025-09-02 PROCEDURE — 3077F SYST BP >= 140 MM HG: CPT | Performed by: PAIN MEDICINE

## 2025-09-02 PROCEDURE — 1159F MED LIST DOCD IN RCRD: CPT | Performed by: PAIN MEDICINE

## 2025-09-02 PROCEDURE — 3078F DIAST BP <80 MM HG: CPT | Performed by: PAIN MEDICINE

## 2025-09-02 PROCEDURE — 1125F AMNT PAIN NOTED PAIN PRSNT: CPT | Performed by: PAIN MEDICINE

## 2025-09-02 PROCEDURE — 99214 OFFICE O/P EST MOD 30 MIN: CPT | Performed by: PAIN MEDICINE

## 2025-09-02 RX ORDER — PANTOPRAZOLE SODIUM 40 MG/1
TABLET, DELAYED RELEASE ORAL
Qty: 90 TABLET | Refills: 0 | Status: SHIPPED | OUTPATIENT
Start: 2025-09-02

## 2025-09-02 ASSESSMENT — ENCOUNTER SYMPTOMS
CONSTIPATION: 1
NAUSEA: 0
DIARRHEA: 0
BACK PAIN: 1
SHORTNESS OF BREATH: 0

## 2025-09-03 ENCOUNTER — TELEPHONE (OUTPATIENT)
Age: 80
End: 2025-09-03

## 2025-10-02 ENCOUNTER — APPOINTMENT (OUTPATIENT)
Dept: OTOLARYNGOLOGY | Facility: CLINIC | Age: 80
End: 2025-10-02
Payer: MEDICARE

## (undated) DEVICE — TUBING, SUCTION, 1/4" X 10', STRAIGHT: Brand: MEDLINE

## (undated) DEVICE — GOWN,AURORA,NONREINFORCED,LARGE: Brand: MEDLINE

## (undated) DEVICE — SUTURE VCRL + SZ 3-0 L27IN ABSRB UD L26MM SH 1/2 CIR VCP416H

## (undated) DEVICE — MEDI-VAC YANKAUER SUCTION HANDLE W/BULBOUS TIP: Brand: CARDINAL HEALTH

## (undated) DEVICE — ELECTRODE PT RET AD L9FT HI MOIST COND ADH HYDRGEL CORDED

## (undated) DEVICE — 4-PORT MANIFOLD: Brand: NEPTUNE 2

## (undated) DEVICE — COVER LT HNDL BLU PLAS

## (undated) DEVICE — GLOVE SURG 5.5 PF POLYISOPRENE WHT STRL SENSICARE MIC

## (undated) DEVICE — PACK,SET UP,DRAPE: Brand: MEDLINE

## (undated) DEVICE — GAUZE,SPONGE,2"X2",8PLY,STERILE,LF,2'S: Brand: MEDLINE

## (undated) DEVICE — GLOVE ORANGE PI 8   MSG9080

## (undated) DEVICE — GLOVE SURG SZ 7 L12IN FNGR THK94MIL TRNSLUC YEL LTX HYDRGEL

## (undated) DEVICE — DRAPE,LAP,CHOLE,W/TROUGHS,STERILE: Brand: MEDLINE

## (undated) DEVICE — Device: Brand: ENDO SMARTCAP

## (undated) DEVICE — FORCEPS ENDOSCP BX OVL CUP SERR W/NEEDLE 2.3MM DIA 160CML

## (undated) DEVICE — ENDO CARRY-ON PROCEDURE KIT: Brand: ENDO CARRY-ON PROCEDURE KIT

## (undated) DEVICE — INTENDED FOR TISSUE SEPARATION, AND OTHER PROCEDURES THAT REQUIRE A SHARP SURGICAL BLADE TO PUNCTURE OR CUT.: Brand: BARD-PARKER ® CARBON RIB-BACK BLADES

## (undated) DEVICE — GLOVE SURG SZ 85 STD WHT LTX SYN POLYMER BEAD REINF ANTI RL

## (undated) DEVICE — 3M™ STERI-STRIP™ REINFORCED ADHESIVE SKIN CLOSURES, R1547, 1/2 IN X 4 IN (12 MM X 100 MM), 6 STRIPS/ENVELOPE: Brand: 3M™ STERI-STRIP™

## (undated) DEVICE — PACK,LAPAROTOMY,NO GOWNS: Brand: MEDLINE

## (undated) DEVICE — LITHOTOMY DRAPE WITH FLUID CONTROL POUCH: Brand: CONVERTORS

## (undated) DEVICE — CATHETER,URETHRAL,VINYL,FEMALE,6",14FR: Brand: MEDLINE

## (undated) DEVICE — GLOVE SURG SZ 65 THK91MIL LTX FREE SYN POLYISOPRENE

## (undated) DEVICE — SUTURE VCRL + SZ 0 L27IN ABSRB VLT L26MM UR-6 5/8 CIR VCP603H

## (undated) DEVICE — 1842 FOAM BLOCK NEEDLE COUNTER: Brand: DEVON

## (undated) DEVICE — BRUSH ENDO CLN L90.5IN SHTH DIA1.7MM BRIST DIA5-7MM 2-6MM

## (undated) DEVICE — GLOVE ORANGE PI 7 1/2   MSG9075

## (undated) DEVICE — INSUFFLATION TUBING SET, WITH FILTER: Brand: CONMED

## (undated) DEVICE — DEFOGGER!" ANTI FOG KIT: Brand: DEROYAL

## (undated) DEVICE — STERILE COMFO-TEX LATEX FREE ELASTIC BANDAGE, 2INX5YD: Brand: COMFO-TEX™

## (undated) DEVICE — TRAY PREP DRY W/ PREM GLV 2 APPL 6 SPNG 2 UNDPD 1 OVERWRAP

## (undated) DEVICE — ADAPTER FLSH PMP FLD MGMT GI IRRIG OFP 2 DISPOSABLE

## (undated) DEVICE — CONMED SCOPE SAVER BITE BLOCK, 20X27 MM: Brand: SCOPE SAVER

## (undated) DEVICE — BINDER ABD UNIV H9IN WAIST 30-45IN E SFT COT PREM 3 PNL

## (undated) DEVICE — SUTURE VCRL + SZ 4-0 L18IN ABSRB UD L19MM PS-2 3/8 CIR PRIM VCP496H

## (undated) DEVICE — SUTURE MCRYL + SZ 4-0 L27IN ABSRB UD L19MM PS-2 3/8 CIR MCP426H

## (undated) DEVICE — WARMER SCP 2 ANTIFOG LAP DISP

## (undated) DEVICE — Device

## (undated) DEVICE — SLEEVE CMPR SM STD CALF SCD ANEMB LF

## (undated) DEVICE — SET ENDOSCP SEAL HYSTEROSCOPE RIG OUTFLO CHN DISP MYOSURE

## (undated) DEVICE — TUBE SET 96 MM 64 MM H2O PERISTALTIC STD AUX CHANNEL

## (undated) DEVICE — LINER PAD CONTOUR SUPER PEACH 7X14IN

## (undated) DEVICE — CHLORAPREP 26ML ORANGE

## (undated) DEVICE — FORCEPS BX L240CM JAW DIA2.8MM L CAP W/ NDL MIC MESH TOOTH

## (undated) DEVICE — WARMER LAPSCP BST 2 STG STRL DISP HEAT BLU

## (undated) DEVICE — CATHETER URIN INTMIT SELF CATH STR TIP 14 FR

## (undated) DEVICE — SPONGE GZ W4XL4IN RAYON POLY FILL CVR W/ NONWOVEN FAB

## (undated) DEVICE — SPONGE: LAP 4X18 W XR 200/CS: Brand: MEDICAL ACTION INDUSTRIES

## (undated) DEVICE — GAUZE,SPONGE,4"X4",12PLY,STERILE,LF,2'S: Brand: MEDLINE

## (undated) DEVICE — UNDERCAST PADDING: Brand: DEROYAL

## (undated) DEVICE — SUTURE VCRL SZ 3-0 L54IN ABSRB VLT LIGAPAK REEL NDL J205G

## (undated) DEVICE — HYPODERMIC SAFETY NEEDLE: Brand: MAGELLAN

## (undated) DEVICE — SET KNF FOR MINI CRPL TUNN REL SYS SFGRD 5PK

## (undated) DEVICE — SYRINGE MED 10ML LUERLOCK TIP W/O SFTY DISP

## (undated) DEVICE — SET FLD CTRL SYS INFLO AND OUTFLO TB AQUILEX

## (undated) DEVICE — SKIN MARKER,REGULAR TIP WITH RULER: Brand: DEVON

## (undated) DEVICE — X-RAY DETECTABLE SPONGES,16 PLY: Brand: VISTEC

## (undated) DEVICE — 1010 S-DRAPE TOWEL DRAPE 10/BX: Brand: STERI-DRAPE™

## (undated) DEVICE — PENCIL ES L3M BTTN SWCH HOLSTER W/ BLDE ELECTRD EDGE

## (undated) DEVICE — LABEL MED MINI W/ MARKER

## (undated) DEVICE — GLOVE SURG L12IN SZ 65FNGR THK94MIL TRNSLUC YEL LTX

## (undated) DEVICE — TROCAR ENDOSCP L100MM DIA12MM BLNT STBL SL DISP ENDOPATH

## (undated) DEVICE — SYRINGE BLB 50CC IRRIG PLIABLE FNGR FLNG GRAD FLSK DISP

## (undated) DEVICE — GLOVE ORANGE PI 8 1/2   MSG9085

## (undated) DEVICE — HAND II: Brand: MEDLINE INDUSTRIES, INC.

## (undated) DEVICE — KIT CANSTR VAC TANTEM TB FOR AQUILEX FLD CTRL SYS

## (undated) DEVICE — TOWEL,OR,DSP,ST,BLUE,STD,4/PK,20PK/CS: Brand: MEDLINE

## (undated) DEVICE — MEDI-VAC NON-CONDUCTIVE SUCTION TUBING: Brand: CARDINAL HEALTH

## (undated) DEVICE — PAD,NON-ADHERENT,3X8,STERILE,LF,1/PK: Brand: MEDLINE

## (undated) DEVICE — TROCAR: Brand: KII SHIELDED BLADED ACCESS SYSTEM

## (undated) DEVICE — GAUZE,SPONGE,4"X4",16PLY,XRAY,STRL,LF: Brand: MEDLINE